# Patient Record
Sex: FEMALE | Race: WHITE | NOT HISPANIC OR LATINO | Employment: OTHER | URBAN - METROPOLITAN AREA
[De-identification: names, ages, dates, MRNs, and addresses within clinical notes are randomized per-mention and may not be internally consistent; named-entity substitution may affect disease eponyms.]

---

## 2017-01-09 ENCOUNTER — MYC MEDICAL ADVICE (OUTPATIENT)
Dept: RHEUMATOLOGY | Facility: CLINIC | Age: 77
End: 2017-01-09

## 2017-01-16 ENCOUNTER — MYC MEDICAL ADVICE (OUTPATIENT)
Dept: RHEUMATOLOGY | Facility: CLINIC | Age: 77
End: 2017-01-16

## 2017-01-18 ENCOUNTER — TRANSFERRED RECORDS (OUTPATIENT)
Dept: HEALTH INFORMATION MANAGEMENT | Facility: CLINIC | Age: 77
End: 2017-01-18

## 2017-01-18 LAB — PHQ9 SCORE: 9

## 2017-01-23 ENCOUNTER — MYC MEDICAL ADVICE (OUTPATIENT)
Dept: INTERNAL MEDICINE | Facility: CLINIC | Age: 77
End: 2017-01-23

## 2017-01-23 DIAGNOSIS — Z53.9 DIAGNOSIS NOT YET DEFINED: Primary | ICD-10-CM

## 2017-01-23 DIAGNOSIS — E78.5 HYPERLIPIDEMIA LDL GOAL <100: Primary | Chronic | ICD-10-CM

## 2017-01-23 PROCEDURE — G0179 MD RECERTIFICATION HHA PT: HCPCS | Performed by: INTERNAL MEDICINE

## 2017-01-23 NOTE — TELEPHONE ENCOUNTER
atorvastatin (LIPITOR) 40 MG tablet     Last Written Prescription Date: 1/4/2016  Last Fill Quantity: 90, # refills: 3  Last Office Visit with G, P or [facility] prescribing provider: 12/6/2016   Next 5 appointments (look out 90 days)     Jan 27, 2017  2:30 PM   Office Visit with Georgia Vázquez MD   [facility] ([facility])    [address] [place] [address]   [phone]            Feb 10, 2017 10:00 AM   Return Visit with Nico Byers MD   [facility] ([facility])    [address] [place] [address]   [phone]                   CHOL      176   10/28/2016  HDL       77   10/28/2016  LDL       78   10/28/2016  LDL       64   1/15/2013  TRIG      103   10/28/2016  CHOLHDLRATIO      2.8   1/13/2014

## 2017-01-23 NOTE — TELEPHONE ENCOUNTER
Please note that Methotrexate now requires a prior auth. Her insurance goes through [employer].    Their telephone number is [phone]. BIN# [id number], ID# [id number], NO PCN #, GR# [id number]    Please contact us when approved/denied.    Thanks,

## 2017-01-26 RX ORDER — ATORVASTATIN CALCIUM 40 MG/1
TABLET, FILM COATED ORAL
Qty: 90 TABLET | Refills: 2 | Status: SHIPPED | OUTPATIENT
Start: 2017-01-26 | End: 2017-11-14

## 2017-01-26 NOTE — TELEPHONE ENCOUNTER
Prescription approved per [facility].    Signed Prescriptions:                        Disp   Refills    atorvastatin (LIPITOR) 40 MG tablet        90 tab*2        Sig: TAKE ONE TABLET BY MOUTH EVERY DAY  Authorizing Provider: BRIONNA LOPEZ  Ordering User: MARIO GLOVER, RN, BSN

## 2017-01-27 ENCOUNTER — TELEPHONE (OUTPATIENT)
Dept: RHEUMATOLOGY | Facility: CLINIC | Age: 77
End: 2017-01-27

## 2017-01-27 ENCOUNTER — OFFICE VISIT (OUTPATIENT)
Dept: INTERNAL MEDICINE | Facility: CLINIC | Age: 77
End: 2017-01-27
Payer: COMMERCIAL

## 2017-01-27 VITALS
TEMPERATURE: 97.8 F | HEART RATE: 86 BPM | BODY MASS INDEX: 33.49 KG/M2 | DIASTOLIC BLOOD PRESSURE: 86 MMHG | SYSTOLIC BLOOD PRESSURE: 120 MMHG | OXYGEN SATURATION: 96 % | HEIGHT: 65 IN | WEIGHT: 201 LBS

## 2017-01-27 DIAGNOSIS — F33.0 MAJOR DEPRESSIVE DISORDER, RECURRENT EPISODE, MILD (H): Primary | ICD-10-CM

## 2017-01-27 DIAGNOSIS — J01.90 ACUTE SINUSITIS WITH COEXISTING CONDITION REQUIRING PROPHYLACTIC TREATMENT: ICD-10-CM

## 2017-01-27 LAB
ALBUMIN SERPL-MCNC: 3.8 G/DL (ref 3.4–5)
ALP SERPL-CCNC: 45 U/L (ref 40–150)
ALT SERPL W P-5'-P-CCNC: 39 U/L (ref 0–50)
ANION GAP SERPL CALCULATED.3IONS-SCNC: 10 MMOL/L (ref 3–14)
AST SERPL W P-5'-P-CCNC: 35 U/L (ref 0–45)
BASOPHILS # BLD AUTO: 0 10E9/L (ref 0–0.2)
BASOPHILS NFR BLD AUTO: 0.2 %
BILIRUB DIRECT SERPL-MCNC: 0.1 MG/DL (ref 0–0.2)
BILIRUB SERPL-MCNC: 0.3 MG/DL (ref 0.2–1.3)
BUN SERPL-MCNC: 10 MG/DL (ref 7–30)
CALCIUM SERPL-MCNC: 9.9 MG/DL (ref 8.5–10.1)
CHLORIDE SERPL-SCNC: 107 MMOL/L (ref 94–109)
CO2 SERPL-SCNC: 25 MMOL/L (ref 20–32)
CREAT SERPL-MCNC: 0.62 MG/DL (ref 0.52–1.04)
DIFFERENTIAL METHOD BLD: ABNORMAL
EOSINOPHIL # BLD AUTO: 0.4 10E9/L (ref 0–0.7)
EOSINOPHIL NFR BLD AUTO: 7.3 %
ERYTHROCYTE [DISTWIDTH] IN BLOOD BY AUTOMATED COUNT: 15.4 % (ref 10–15)
FOLATE SERPL-MCNC: >100 NG/ML
GFR SERPL CREATININE-BSD FRML MDRD: NORMAL ML/MIN/1.7M2
GLUCOSE SERPL-MCNC: 98 MG/DL (ref 70–99)
HCT VFR BLD AUTO: 38.5 % (ref 35–47)
HGB BLD-MCNC: 11.9 G/DL (ref 11.7–15.7)
LYMPHOCYTES # BLD AUTO: 1.6 10E9/L (ref 0.8–5.3)
LYMPHOCYTES NFR BLD AUTO: 31.6 %
MCH RBC QN AUTO: 30.6 PG (ref 26.5–33)
MCHC RBC AUTO-ENTMCNC: 30.9 G/DL (ref 31.5–36.5)
MCV RBC AUTO: 99 FL (ref 78–100)
MONOCYTES # BLD AUTO: 0.7 10E9/L (ref 0–1.3)
MONOCYTES NFR BLD AUTO: 14.1 %
NEUTROPHILS # BLD AUTO: 2.4 10E9/L (ref 1.6–8.3)
NEUTROPHILS NFR BLD AUTO: 46.8 %
PLATELET # BLD AUTO: 408 10E9/L (ref 150–450)
POTASSIUM SERPL-SCNC: 4.1 MMOL/L (ref 3.4–5.3)
PROT SERPL-MCNC: 7.6 G/DL (ref 6.8–8.8)
RBC # BLD AUTO: 3.89 10E12/L (ref 3.8–5.2)
SODIUM SERPL-SCNC: 142 MMOL/L (ref 133–144)
T4 FREE SERPL-MCNC: 0.95 NG/DL (ref 0.76–1.46)
TSH SERPL DL<=0.05 MIU/L-ACNC: 0.97 MU/L (ref 0.4–4)
VIT B12 SERPL-MCNC: 1000 PG/ML (ref 193–986)
WBC # BLD AUTO: 5.1 10E9/L (ref 4–11)

## 2017-01-27 PROCEDURE — 85025 COMPLETE CBC W/AUTO DIFF WBC: CPT | Performed by: PSYCHIATRY & NEUROLOGY

## 2017-01-27 PROCEDURE — 84443 ASSAY THYROID STIM HORMONE: CPT | Performed by: PSYCHIATRY & NEUROLOGY

## 2017-01-27 PROCEDURE — 36415 COLL VENOUS BLD VENIPUNCTURE: CPT | Performed by: PSYCHIATRY & NEUROLOGY

## 2017-01-27 PROCEDURE — 80076 HEPATIC FUNCTION PANEL: CPT | Performed by: PSYCHIATRY & NEUROLOGY

## 2017-01-27 PROCEDURE — 80048 BASIC METABOLIC PNL TOTAL CA: CPT | Performed by: PSYCHIATRY & NEUROLOGY

## 2017-01-27 PROCEDURE — 82746 ASSAY OF FOLIC ACID SERUM: CPT | Performed by: PSYCHIATRY & NEUROLOGY

## 2017-01-27 PROCEDURE — 82607 VITAMIN B-12: CPT | Performed by: PSYCHIATRY & NEUROLOGY

## 2017-01-27 PROCEDURE — 84439 ASSAY OF FREE THYROXINE: CPT | Performed by: PSYCHIATRY & NEUROLOGY

## 2017-01-27 PROCEDURE — 99214 OFFICE O/P EST MOD 30 MIN: CPT | Performed by: INTERNAL MEDICINE

## 2017-01-27 RX ORDER — DOXYCYCLINE 100 MG/1
100 CAPSULE ORAL 2 TIMES DAILY
Qty: 20 CAPSULE | Refills: 0 | Status: SHIPPED | OUTPATIENT
Start: 2017-01-27 | End: 2017-02-06

## 2017-01-27 ASSESSMENT — PAIN SCALES - GENERAL: PAINLEVEL: NO PAIN (0)

## 2017-01-27 NOTE — MR AVS SNAPSHOT
After Visit Summary   1/27/2017    Ginger Marshall    MRN: [id number]           Patient Information     Date Of Birth          1940        Visit Information        Provider Department      1/27/2017 2:30 PM Georgia Vázquez MD [facility]        Today's Diagnoses     BMI 33.0-33.9,adult    -  1     Acute sinusitis with coexisting condition requiring prophylactic treatment           Care Instructions    You need to start eating more vegetables and protein.   Eat some yogurt or probiotics while you're on the antibiotic.   Start taking Doxycycline as per prescription instructions.   Stop taking iron supplement and multivitamin while on antibiotic.   Follow up in 1 month.        [facility]    If you have any questions regarding to your visit please contact your care team:     Team Pink:   Clinic Hours Telephone Number   Internal Medicine:  Dr. Georgia Flores, NP       7am-7pm  Monday - Thursday   7am-5pm  Fridays  [phone]  (Appointment scheduling available 24/7)    Questions about your visit?  Team Line  [phone]   Urgent Care - [place] - 11am-9pm Monday-Friday Saturday-Sunday- 9am-5pm   [place] - 5pm-9pm Monday-Friday Saturday-Sunday- 9am-5pm  [phone] - [place]   [phone] - [place]       What options do I have for visits at the clinic other than the traditional office visit?  To expand how we care for you, many of our providers are utilizing electronic visits (e-visits) and telephone visits, when medically appropriate, for interactions with their patients rather than a visit in the clinic.   We also offer nurse visits for many medical concerns. Just like any other service, we will bill your insurance company for this type of visit based on time spent on the phone with your provider. Not all insurance companies cover these visits. Please check with your medical insurance if this type  of visit is covered. You will be responsible for any charges that are not paid by your insurance.      E-visits via MissingLINKhart:  generally incur a $35.00 fee.  Telephone visits:  Time spent on the phone: *charged based on time that is spent on the phone in increments of 10 minutes. Estimated cost:   5-10 mins $30.00   11-20 mins. $59.00   21-30 mins. $85.00   Use MissingLINKhart (secure email communication and access to your chart) to send your primary care provider a message or make an appointment. Ask someone on your Team how to sign up for [employer].    For a Price Quote for your services, please call our [place] Line at [phone].    As always, Thank you for trusting us with your health care needs!    Estephania Ibarra CMA          Follow-ups after your visit        Your next 10 appointments already scheduled     Feb 08, 2017  1:00 PM   LAB with  LAB   [facility] ([address] [address]   [phone]           Patient must bring picture ID.  Patient should be prepared to give a urine specimen  Please do not eat 10-12 hours before your appointment if you are coming in fasting for labs on lipids, cholesterol, or glucose (sugar).  Pregnant women should follow their Care Team instructions. Water with medications is okay. Do not drink coffee or other fluids.   If you have concerns about taking  your medications, please ask at office or if scheduling via MissingLINKhart, send a message by clicking on Secure Messaging, Message Your Care Team.            Feb 10, 2017 10:00 AM   Return Visit with Nico Byers MD   [address] [address]   [phone]            Sep 25, 2017  1:00 PM   Return Visit with Arlet Gonzalez MD   [facility] ([facility])    [address] [place] [id number] [phone]              Who to contact     If you  "have questions or need follow up information about today's clinic visit or your schedule please contact [facility] directly at [phone].  Normal or non-critical lab and imaging results will be communicated to you by [employer], letter or phone within 4 business days after the clinic has received the results. If you do not hear from us within 7 days, please contact the clinic through [employer] or phone. If you have a critical or abnormal lab result, we will notify you by phone as soon as possible.  Submit refill requests through 911 Pets or call your pharmacy and they will forward the refill request to us. Please allow 3 business days for your refill to be completed.          Additional Information About Your Visit        [employer] Information     [employer] gives you secure access to your electronic health record. If you see a primary care provider, you can also send messages to your care team and make appointments. If you have questions, please call your primary care clinic.  If you do not have a primary care provider, please call [phone] and they will assist you.        Care EveryWhere ID     This is your Care EveryWhere ID. This could be used by other organizations to access your [place] medical records  [id number]        Your Vitals Were     Pulse Temperature Height BMI (Body Mass Index) Pulse Oximetry Breastfeeding?    86 97.8  F (36.6  C) (Oral) 5' 5.25\" (1.657 m) 33.21 kg/m2 96% No       Blood Pressure from Last 3 Encounters:   01/27/17 120/86   12/30/16 116/70   12/06/16 116/68    Weight from Last 3 Encounters:   01/27/17 201 lb (91.173 kg)   12/30/16 206 lb 8 oz (93.668 kg)   12/06/16 210 lb (95.255 kg)              Today, you had the following     No orders found for display         Today's Medication Changes          These changes are accurate as of: 1/27/17  2:52 PM.  If you have any questions, ask your nurse or doctor.               Start taking these medicines.        Dose/Directions    " doxycycline Monohydrate 100 MG Caps   Used for:  Acute sinusitis with coexisting condition requiring prophylactic treatment   Started by:  Georgia Vázquez MD        Dose:  100 mg   Take 1 capsule (100 mg) by mouth 2 times daily for 10 days   Quantity:  20 capsule   Refills:  0         These medicines have changed or have updated prescriptions.        Dose/Directions    ferrous gluconate 324 (38 FE) MG tablet   Commonly known as:  FERGON   This may have changed:  when to take this   Used for:  Low iron        Dose:  324 mg   Take 1 tablet (324 mg) by mouth 3 times daily (with meals)   Quantity:  270 tablet   Refills:  1            Where to get your medicines      These medications were sent to [facility] [address] [facility]  [address] [address]     Phone:  [phone]    - doxycycline Monohydrate 100 MG Caps             Primary Care Provider Office Phone # Fax #    Georgia Vázquez [phone]       [address] [address]        Thank you!     Thank you for choosing [facility]  for your care. Our goal is always to provide you with excellent care. Hearing back from our patients is one way we can continue to improve our services. Please take a few minutes to complete the written survey that you may receive in the mail after your visit with us. Thank you!             Your Updated Medication List - Protect others around you: Learn how to safely use, store and throw away your medicines at www.disposemymeds.org.          This list is accurate as of: 1/27/17  2:52 PM.  Always use your most recent med list.                   Brand Name Dispense Instructions for use    albuterol (2.5 MG/3ML) 0.083% neb solution     180 mL    Take 1 vial (2.5 mg) by nebulization every 6 hours as needed for shortness of breath / dyspnea or wheezing       ascorbic acid 1000 MG Tabs tablet      Take 1 tablet  by mouth 3 times daily       aspirin 81 MG tablet      Take 1 tablet by mouth daily.       atorvastatin 40 MG tablet    LIPITOR    90 tablet    TAKE ONE TABLET BY MOUTH EVERY DAY       CALCIUM CITRATE + PO      Take 2 tablets by mouth daily       cetirizine 10 MG tablet    zyrTEC    90 tablet    Take 1 tablet by mouth daily.       cyanocobalamin 500 MCG Tabs      Take 1 tablet by mouth 2 times daily       doxycycline Monohydrate 100 MG Caps     20 capsule    Take 1 capsule (100 mg) by mouth 2 times daily for 10 days       DULoxetine 60 MG EC capsule    CYMBALTA    180 capsule    Take 1 capsule (60 mg) by mouth 2 times daily       ferrous gluconate 324 (38 FE) MG tablet    FERGON    270 tablet    Take 1 tablet (324 mg) by mouth 3 times daily (with meals)       fluticasone 50 MCG/ACT spray    FLONASE    16 g    Spray 2 sprays into both nostrils daily       folic acid 1 MG tablet    FOLVITE    100 tablet    Take 1 tablet (1 mg) by mouth daily       magnesium oxide 400 (241.3 MG) MG tablet    MAG-OX     Take 1 tablet (400 mg) by mouth daily       methotrexate 2.5 MG tablet CHEMO     120 tablet    Take 10 tablets (25 mg) by mouth once a week .  Split dose between AM and PM (5 tabs in the morning, and 5 tabs in the evening; all taken within the same day each week)       Multi-vitamin Tabs tablet      Take 1 tablet by mouth daily       NEW MED      Hypo-chlor spray soln. Use BID on eyelids       OMEGA-3 FISH OIL PO      Take 2,000 mg by mouth daily       order for [facility]      Respironics REMSTAR 60 Series Auto CPAP 12-15 cm H2O, Wisp nasal mask w/a s/m cushion       pantoprazole 40 MG EC tablet    PROTONIX    90 tablet    TAKE ONE TABLET BY MOUTH ONCE DAILY 30-60 MINUTES BEFORE A MEAL       propylene glycol 0.6 % Soln ophthalmic solution    SYSTANE BALANCE     Place 1 drop into both eyes 4 times daily       sodium chloride 5 % ophthalmic ointment    BIRDIE 128     Apply small amount to both eyelids at bedtime       tofacitinib  11 MG 24 hr tablet    XELJANZ XR    90 tablet    Take 1 tablet (11 mg) by mouth daily       topiramate 25 MG tablet    TOPAMAX    270 tablet    Take 1 in the morning and 2 at bedtime.       TUMS PO      Take  by mouth as needed.       TYLENOL 500 MG tablet   Generic drug:  acetaminophen     100 tablet    Take 2 tablets (1,000 mg) by mouth every 8 hours as needed for pain       vitamin B complex with vitamin C Tabs tablet      Take 1 tablet by mouth daily.       VITAMIN D-3 PO      Take 1,000 Units by mouth 2 times daily       VITAMIN E PO      Take 2,000 Units by mouth

## 2017-01-27 NOTE — PROGRESS NOTES
Medical Weight Loss - Return Visit      CHIEF COMPLAINT:     Chief Complaint   Patient presents with     Weight Problem     follow-up       HISTORY OF PRESENT ILLNESS (HPI):    Patient  returns today for medical weight loss follow-up visit. Patient was last seen by me on 12/30/2016 and has lost 5 pounds. Fat percentage is down 2%.     She continues on Topiramate.     Patient is seeing a psychologist    Sinus Symptoms: She started having a cold on Saturday. She thinks that she is getting a sinus infection. She feels like she has been about the same since it started. She continues to have a lot of pressure in her sinuses. She confirms that she has an occasional cough, and is able to relieve some congestion when she uses a nasal rinse.     Weight Check: She explains that at home she was able to get under 200 pounds. She is able to wear the clothes she had set aside for herself when she got thinner. She notes that this morning she took her old clothes to the [employer] store.     Diet: She is not doing too much protein powder as much anymore. She states that this is because she doesn't have an appetite anymore. She feels that she has a tendency to forget to record all of her food into her food diary.     10 point ROS of systems including Constitutional, Eyes, Respiratory, Cardiovascular, Gastroenterology, Genitourinary, Integumentary, Muscularskeletal, Psychiatric were all negative except for pertinent positives noted in my HPI.     This document serves as a record of the services and decisions personally performed and made by Georgia Vázquez MD. It was created on his/her behalf by Carmen Miller, a trained medical scribe. The creation of this document is based the provider's statements to the medical scribe.    Margarita Miller 2:37 PM, January 27, 2017    MEDICATIONS:   Current Outpatient Prescriptions   Medication Sig Dispense Refill     atorvastatin (LIPITOR) 40 MG tablet TAKE ONE TABLET BY MOUTH EVERY DAY 90  tablet 2     DULoxetine (CYMBALTA) 60 MG EC capsule Take 1 capsule (60 mg) by mouth 2 times daily 180 capsule 1     pantoprazole (PROTONIX) 40 MG enteric coated tablet TAKE ONE TABLET BY MOUTH ONCE DAILY 30-60 MINUTES BEFORE A MEAL 90 tablet 1     order for [facility] Respironics REMSTAR 60 Series Auto CPAP 12-15 cm H2O, Wisp nasal mask w/a s/m cushion       VITAMIN E PO Take 2,000 Units by mouth       fluticasone (FLONASE) 50 MCG/ACT nasal spray Spray 2 sprays into both nostrils daily 16 g 3     Omega-3 Fatty Acids (OMEGA-3 FISH OIL PO) Take 2,000 mg by mouth daily        methotrexate 2.5 MG tablet Take 10 tablets (25 mg) by mouth once a week .  Split dose between AM and PM (5 tabs in the morning, and 5 tabs in the evening; all taken within the same day each week) 120 tablet 1     folic acid (FOLVITE) 1 MG tablet Take 1 tablet (1 mg) by mouth daily 100 tablet 3     multivitamin, therapeutic with minerals (MULTI-VITAMIN) TABS Take 1 tablet by mouth daily       magnesium oxide (MAG-OX) 400 (241.3 MG) MG tablet Take 1 tablet (400 mg) by mouth daily       propylene glycol (SYSTANE BALANCE) 0.6 % SOLN ophthalmic solution Place 1 drop into both eyes 4 times daily       sodium chloride (BIRDIE 128) 5 % ophthalmic ointment Apply small amount to both eyelids at bedtime  0     NEW MED Hypo-chlor spray soln. Use BID on eyelids       topiramate (TOPAMAX) 25 MG tablet Take 1 in the morning and 2 at bedtime. 270 tablet 4     tofacitinib (XELJANZ XR) 11 MG XR tablet Take 1 tablet (11 mg) by mouth daily 90 tablet 3     albuterol (2.5 MG/3ML) 0.083% nebulizer solution Take 1 vial (2.5 mg) by nebulization every 6 hours as needed for shortness of breath / dyspnea or wheezing 180 mL 2     ferrous gluconate (FERGON) 324 (38 FE) MG tablet Take 1 tablet (324 mg) by mouth 3 times daily (with meals) (Patient taking differently: Take 324 mg by mouth 2 times daily ) 270 tablet 1     cyanocobalamin 500 MCG TABS Take 1 tablet by mouth 2 times daily    "    Calcium Citrate-Vitamin D (CALCIUM CITRATE + PO) Take 2 tablets by mouth daily       Cholecalciferol (VITAMIN D-3 PO) Take 1,000 Units by mouth 2 times daily       Ascorbic Acid 1000 MG TABS Take 1 tablet by mouth 3 times daily       acetaminophen (TYLENOL) 500 MG tablet Take 2 tablets (1,000 mg) by mouth every 8 hours as needed for pain 100 tablet 0     vitamin  B complex with vitamin C (VITAMIN  B COMPLEX) TABS Take 1 tablet by mouth daily.  0     Calcium Carbonate Antacid (TUMS PO) Take  by mouth as needed.       cetirizine (ZYRTEC) 10 MG tablet Take 1 tablet by mouth daily. 90 tablet 3     aspirin 81 MG tablet Take 1 tablet by mouth daily.         ALLERGIES:   Allergies   Allergen Reactions     Abatacept      Adhesive Tape      Plastic tape     Celebrex [Celecoxib]      Ineffective     Ethanol      Antihistamines     Orencia [Abatacept]      Headache     Septra [Bactrim]      Sulfa Drugs      Tramadol      Headache     Vicodin [Hydrocodone-Acetaminophen]        PHYSICAL EXAMINATION:    VITALS: /86 mmHg  Pulse 86  Temp(Src) 97.8  F (36.6  C) (Oral)  Ht 1.657 m (5' 5.25\")  Wt 91.173 kg (201 lb)  BMI 33.21 kg/m2  SpO2 96%  Breastfeeding? No  GENERAL: Patient is a 76 year old year old female  in no acute distress.  Patient is alert and orientated x 4, pleasant and cooperative with exam.     CARDIOVASCULAR:  Regular rate and rhythm without murmurs, rubs, or gallops.  RESPIRATORY:  Lungs are clear to auscultation bilaterally, respiratory effort is normal.   HENT: Ears normal. Throat and pharynx normal. Neck supple. No adenopathy or masses in the neck or supraclavicular regions. Sinuses non tender.  No edema    LAB RESULTS:   Office Visit on 10/28/2016   Component Date Value Ref Range Status     Cholesterol 10/28/2016 176  <200 mg/dL Final     Triglycerides 10/28/2016 103  <150 mg/dL Final     HDL Cholesterol 10/28/2016 77  >49 mg/dL Final     LDL Cholesterol Calculated 10/28/2016 78  <100 mg/dL Final "    Desirable:       <100 mg/dl     Non HDL Cholesterol 10/28/2016 99  <130 mg/dL Final     Hemoglobin A1C 10/28/2016 5.5  4.3 - 6.0 % Final       ASSESSMENT/PLAN:    I spent 25 minutes of time with the patient and >50% of it was in education and counseling regarding weight loss management.     1. BMI 33.0-33.9,adult  The current medical regimen is effective;  continue present plan and medications.     2. Acute sinusitis with coexisting condition requiring prophylactic treatment   Patient to start antibiotic regimen  - doxycycline Monohydrate 100 MG CAPS; Take 1 capsule (100 mg) by mouth 2 times daily for 10 days  Dispense: 20 capsule; Refill: 0     Patient is to call the clinic if she experiences any adverse reactions.    Patient Instructions   You need to start eating more vegetables and protein.   Eat some yogurt or probiotics while you're on the antibiotic.   Start taking Doxycycline as per prescription instructions. Make sure you take it with food.   Stop taking iron supplement and multivitamin while on antibiotic.       The information in this document, created by the medical scribe for me, accurately reflects the services I personally performed and the decisions made by me. I have reviewed and approved this document for accuracy prior to leaving the patient care area.  Georgia Vázquez MD  2:39 PM, 01/27/2017    Georgia Vázquez MD  Internal Medicine    American Board of Obesity Medicine Diplomate     Start: 2:35 PM  End: 2:50 PM

## 2017-01-27 NOTE — PATIENT INSTRUCTIONS
You need to start eating more vegetables and protein.   Eat some yogurt or probiotics while you're on the antibiotic.   Start taking Doxycycline as per prescription instructions.   Stop taking iron supplement and multivitamin while on antibiotic.   Follow up in 1 month.        [facility]    If you have any questions regarding to your visit please contact your care team:     Team Pink:   Clinic Hours Telephone Number   Internal Medicine:  Dr. Goergia Flores, NP       7am-7pm  Monday - Thursday   7am-5pm  Fridays  [phone]  (Appointment scheduling available 24/7)    Questions about your visit?  Team Line  [phone]   Urgent Care - [place] - 11am-9pm Monday-Friday Saturday-Sunday- 9am-5pm   [place] - 5pm-9pm Monday-Friday Saturday-Sunday- 9am-5pm  [phone] - [place]   [phone] - [place]       What options do I have for visits at the clinic other than the traditional office visit?  To expand how we care for you, many of our providers are utilizing electronic visits (e-visits) and telephone visits, when medically appropriate, for interactions with their patients rather than a visit in the clinic.   We also offer nurse visits for many medical concerns. Just like any other service, we will bill your insurance company for this type of visit based on time spent on the phone with your provider. Not all insurance companies cover these visits. Please check with your medical insurance if this type of visit is covered. You will be responsible for any charges that are not paid by your insurance.      E-visits via MoneyMan:  generally incur a $35.00 fee.  Telephone visits:  Time spent on the phone: *charged based on time that is spent on the phone in increments of 10 minutes. Estimated cost:   5-10 mins $30.00   11-20 mins. $59.00   21-30 mins. $85.00   Use MoneyMan (secure email communication and access to your chart) to send your  primary care provider a message or make an appointment. Ask someone on your Team how to sign up for [employer].    For a Price Quote for your services, please call our Consumer Price Line at [phone].    As always, Thank you for trusting us with your health care needs!    Estephania Ibarra CMA

## 2017-02-02 ENCOUNTER — MYC MEDICAL ADVICE (OUTPATIENT)
Dept: INTERNAL MEDICINE | Facility: CLINIC | Age: 77
End: 2017-02-02

## 2017-02-08 DIAGNOSIS — M05.79 RHEUMATOID ARTHRITIS INVOLVING MULTIPLE SITES WITH POSITIVE RHEUMATOID FACTOR (H): ICD-10-CM

## 2017-02-08 DIAGNOSIS — Z79.899 HIGH RISK MEDICATIONS (NOT ANTICOAGULANTS) LONG-TERM USE: ICD-10-CM

## 2017-02-08 DIAGNOSIS — F33.0 MAJOR DEPRESSIVE DISORDER, RECURRENT EPISODE, MILD (H): ICD-10-CM

## 2017-02-08 LAB
ALBUMIN SERPL-MCNC: 3.6 G/DL (ref 3.4–5)
ALP SERPL-CCNC: 41 U/L (ref 40–150)
ALT SERPL W P-5'-P-CCNC: 37 U/L (ref 0–50)
AMPHETAMINES UR QL SCN: NORMAL
AST SERPL W P-5'-P-CCNC: 21 U/L (ref 0–45)
BARBITURATES UR QL: NORMAL
BASOPHILS # BLD AUTO: 0 10E9/L (ref 0–0.2)
BASOPHILS NFR BLD AUTO: 0.2 %
BENZODIAZ UR QL: NORMAL
BILIRUB DIRECT SERPL-MCNC: <0.1 MG/DL (ref 0–0.2)
BILIRUB SERPL-MCNC: 0.2 MG/DL (ref 0.2–1.3)
CANNABINOIDS UR QL SCN: NORMAL
COCAINE UR QL: NORMAL
CREAT SERPL-MCNC: 0.8 MG/DL (ref 0.52–1.04)
CRP SERPL-MCNC: 7 MG/L (ref 0–8)
DIFFERENTIAL METHOD BLD: ABNORMAL
EOSINOPHIL # BLD AUTO: 0.3 10E9/L (ref 0–0.7)
EOSINOPHIL NFR BLD AUTO: 6.1 %
ERYTHROCYTE [DISTWIDTH] IN BLOOD BY AUTOMATED COUNT: 15.9 % (ref 10–15)
ERYTHROCYTE [SEDIMENTATION RATE] IN BLOOD BY WESTERGREN METHOD: 34 MM/H (ref 0–30)
GFR SERPL CREATININE-BSD FRML MDRD: 70 ML/MIN/1.7M2
HCT VFR BLD AUTO: 36.4 % (ref 35–47)
HGB BLD-MCNC: 11.5 G/DL (ref 11.7–15.7)
LYMPHOCYTES # BLD AUTO: 1.2 10E9/L (ref 0.8–5.3)
LYMPHOCYTES NFR BLD AUTO: 25.2 %
MCH RBC QN AUTO: 31.5 PG (ref 26.5–33)
MCHC RBC AUTO-ENTMCNC: 31.6 G/DL (ref 31.5–36.5)
MCV RBC AUTO: 100 FL (ref 78–100)
MONOCYTES # BLD AUTO: 0.6 10E9/L (ref 0–1.3)
MONOCYTES NFR BLD AUTO: 13.7 %
NEUTROPHILS # BLD AUTO: 2.5 10E9/L (ref 1.6–8.3)
NEUTROPHILS NFR BLD AUTO: 54.8 %
OPIATES UR QL SCN: NORMAL
PCP UR QL SCN: NORMAL
PLATELET # BLD AUTO: 364 10E9/L (ref 150–450)
PROT SERPL-MCNC: 7.4 G/DL (ref 6.8–8.8)
RBC # BLD AUTO: 3.65 10E12/L (ref 3.8–5.2)
WBC # BLD AUTO: 4.6 10E9/L (ref 4–11)

## 2017-02-08 PROCEDURE — 86140 C-REACTIVE PROTEIN: CPT | Performed by: INTERNAL MEDICINE

## 2017-02-08 PROCEDURE — 80307 DRUG TEST PRSMV CHEM ANLYZR: CPT | Performed by: PSYCHIATRY & NEUROLOGY

## 2017-02-08 PROCEDURE — 85652 RBC SED RATE AUTOMATED: CPT | Performed by: INTERNAL MEDICINE

## 2017-02-08 PROCEDURE — 85025 COMPLETE CBC W/AUTO DIFF WBC: CPT | Performed by: INTERNAL MEDICINE

## 2017-02-08 PROCEDURE — 82565 ASSAY OF CREATININE: CPT | Performed by: INTERNAL MEDICINE

## 2017-02-08 PROCEDURE — 36415 COLL VENOUS BLD VENIPUNCTURE: CPT | Performed by: INTERNAL MEDICINE

## 2017-02-08 PROCEDURE — 80076 HEPATIC FUNCTION PANEL: CPT | Performed by: INTERNAL MEDICINE

## 2017-02-10 ENCOUNTER — OFFICE VISIT (OUTPATIENT)
Dept: RHEUMATOLOGY | Facility: CLINIC | Age: 77
End: 2017-02-10
Payer: COMMERCIAL

## 2017-02-10 ENCOUNTER — OFFICE VISIT (OUTPATIENT)
Dept: FAMILY MEDICINE | Facility: CLINIC | Age: 77
End: 2017-02-10
Payer: COMMERCIAL

## 2017-02-10 VITALS
OXYGEN SATURATION: 94 % | HEART RATE: 95 BPM | WEIGHT: 202.6 LBS | SYSTOLIC BLOOD PRESSURE: 112 MMHG | TEMPERATURE: 97 F | DIASTOLIC BLOOD PRESSURE: 68 MMHG | BODY MASS INDEX: 33.47 KG/M2

## 2017-02-10 VITALS
DIASTOLIC BLOOD PRESSURE: 60 MMHG | TEMPERATURE: 96.7 F | BODY MASS INDEX: 33.34 KG/M2 | OXYGEN SATURATION: 98 % | WEIGHT: 201.8 LBS | HEART RATE: 98 BPM | SYSTOLIC BLOOD PRESSURE: 120 MMHG

## 2017-02-10 DIAGNOSIS — M54.5 CHRONIC MIDLINE LOW BACK PAIN, WITH SCIATICA PRESENCE UNSPECIFIED: ICD-10-CM

## 2017-02-10 DIAGNOSIS — G89.29 CHRONIC MIDLINE LOW BACK PAIN, WITH SCIATICA PRESENCE UNSPECIFIED: ICD-10-CM

## 2017-02-10 DIAGNOSIS — J01.90 ACUTE SINUSITIS WITH SYMPTOMS > 10 DAYS: Primary | ICD-10-CM

## 2017-02-10 DIAGNOSIS — M05.79 RHEUMATOID ARTHRITIS INVOLVING MULTIPLE SITES WITH POSITIVE RHEUMATOID FACTOR (H): Primary | ICD-10-CM

## 2017-02-10 DIAGNOSIS — Z79.899 HIGH RISK MEDICATIONS (NOT ANTICOAGULANTS) LONG-TERM USE: ICD-10-CM

## 2017-02-10 PROCEDURE — 99213 OFFICE O/P EST LOW 20 MIN: CPT | Performed by: NURSE PRACTITIONER

## 2017-02-10 PROCEDURE — 99214 OFFICE O/P EST MOD 30 MIN: CPT | Performed by: INTERNAL MEDICINE

## 2017-02-10 RX ORDER — METHOTREXATE SODIUM 2.5 MG/1
25 TABLET ORAL WEEKLY
Qty: 120 TABLET | Refills: 1 | Status: SHIPPED | OUTPATIENT
Start: 2017-02-10 | End: 2017-05-01

## 2017-02-10 RX ORDER — SERTRALINE HYDROCHLORIDE 25 MG/1
25 TABLET, FILM COATED ORAL DAILY
COMMUNITY
Start: 2017-02-07 | End: 2018-06-22

## 2017-02-10 RX ORDER — CEFDINIR 300 MG/1
300 CAPSULE ORAL 2 TIMES DAILY
Qty: 20 CAPSULE | Refills: 0 | Status: SHIPPED | OUTPATIENT
Start: 2017-02-10 | End: 2017-02-23

## 2017-02-10 ASSESSMENT — PAIN SCALES - GENERAL: PAINLEVEL: EXTREME PAIN (8)

## 2017-02-10 ASSESSMENT — ANXIETY QUESTIONNAIRES
6. BECOMING EASILY ANNOYED OR IRRITABLE: SEVERAL DAYS
IF YOU CHECKED OFF ANY PROBLEMS ON THIS QUESTIONNAIRE, HOW DIFFICULT HAVE THESE PROBLEMS MADE IT FOR YOU TO DO YOUR WORK, TAKE CARE OF THINGS AT HOME, OR GET ALONG WITH OTHER PEOPLE: NOT DIFFICULT AT ALL
1. FEELING NERVOUS, ANXIOUS, OR ON EDGE: NOT AT ALL
2. NOT BEING ABLE TO STOP OR CONTROL WORRYING: NOT AT ALL
5. BEING SO RESTLESS THAT IT IS HARD TO SIT STILL: NOT AT ALL
GAD7 TOTAL SCORE: 1
3. WORRYING TOO MUCH ABOUT DIFFERENT THINGS: NOT AT ALL
7. FEELING AFRAID AS IF SOMETHING AWFUL MIGHT HAPPEN: NOT AT ALL

## 2017-02-10 ASSESSMENT — PATIENT HEALTH QUESTIONNAIRE - PHQ9: 5. POOR APPETITE OR OVEREATING: NOT AT ALL

## 2017-02-10 NOTE — MR AVS SNAPSHOT
After Visit Summary   2/10/2017    Ginger Marshall    MRN: [id number]           Patient Information     Date Of Birth          1940        Visit Information        Provider Department      2/10/2017 12:20 PM Dahiana Flores APRN [facility]        Today's Diagnoses     Acute sinusitis with symptoms > 10 days    -  1       Care Instructions    [facility]    If you have any questions regarding to your visit please contact your care team:     Team Pink:   Clinic Hours Telephone Number   Internal Medicine:  Dr. Georgia Flores, NP       7am-7pm  Monday - Thursday   7am-5pm  Fridays  [phone]  (Appointment scheduling available 24/7)    Questions about your visit?  Team Line  [phone]   Urgent Care - [place] and [place] - 11am-9pm Monday-Friday Saturday-Sunday- 9am-5pm   [place] - 5pm-9pm Monday-Friday Saturday-Sunday- 9am-5pm  [phone] - [place]   [phone] - [place]       What options do I have for visits at the clinic other than the traditional office visit?  To expand how we care for you, many of our providers are utilizing electronic visits (e-visits) and telephone visits, when medically appropriate, for interactions with their patients rather than a visit in the clinic.   We also offer nurse visits for many medical concerns. Just like any other service, we will bill your insurance company for this type of visit based on time spent on the phone with your provider. Not all insurance companies cover these visits. Please check with your medical insurance if this type of visit is covered. You will be responsible for any charges that are not paid by your insurance.      E-visits via Roommate[employer]:  generally incur a $35.00 fee.  Telephone visits:  Time spent on the phone: *charged based on time that is spent on the phone in increments of 10 minutes. Estimated cost:   5-10 mins $30.00   11-20  mins. $59.00   21-30 mins. $85.00   Use [employer] (secure email communication and access to your chart) to send your primary care provider a message or make an appointment. Ask someone on your Team how to sign up for [employer].    For a Price Quote for your services, please call our Consumer Price Line at [phone].    As always, Thank you for trusting us with your health care needs!    Melinda SHULTZ CMA ([facility])          Follow-ups after your visit        Your next 10 appointments already scheduled     Mar 01, 2017 11:30 AM   Office Visit with Georgia Vázquez MD   [facility]    [address] [place] [id number] [phone]           Bring a current list of meds and any records pertaining to this visit.  For Physicals, please bring immunization records and any forms needing to be filled out.  Please arrive 10 minutes early to complete paperwork.            May 05, 2017 11:00 AM   LAB with [facility]   [facility]    [address] [place] [id number] [phone]           Patient must bring picture ID.  Patient should be prepared to give a urine specimen  Please do not eat 10-12 hours before your appointment if you are coming in fasting for labs on lipids, cholesterol, or glucose (sugar).  Pregnant women should follow their Care Team instructions. Water with medications is okay. Do not drink coffee or other fluids.   If you have concerns about taking  your medications, please ask at office or if scheduling via [employer], send a message by clicking on Secure Messaging, Message Your Care Team.            May 10, 2017 10:40 AM   Return Visit with Nico Byers MD   [facility]    [address] [place] [id number] [phone]            Sep 25, 2017  1:00 PM   [facility]     [address] [address] [place] [id number] [phone]              Future tests that were ordered for you today     Open Future Orders        Priority Expected Expires Ordered    CBC with platelets differential Routine 5/7/2017 5/26/2017 2/10/2017    Creatinine Routine 5/7/2017 5/26/2017 2/10/2017    CRP inflammation Routine 5/7/2017 5/26/2017 2/10/2017    Erythrocyte sedimentation rate auto Routine 5/7/2017 5/26/2017 2/10/2017    Hepatic panel Routine 5/7/2017 5/26/2017 2/10/2017            Who to contact     If you have questions or need follow up information about today's clinic visit or your schedule please contact [facility] directly at [phone].  Normal or non-critical lab and imaging results will be communicated to you by [employer], letter or phone within 4 business days after the clinic has received the results. If you do not hear from us within 7 days, please contact the clinic through [employer] or phone. If you have a critical or abnormal lab result, we will notify you by phone as soon as possible.  Submit refill requests through Cardio control or call your pharmacy and they will forward the refill request to us. Please allow 3 business days for your refill to be completed.          Additional Information About Your Visit        [employer] Information     Card[employer] control gives you secure access to your electronic health record. If you see a primary care provider, you can also send messages to your care team and make appointments. If you have questions, please call your primary care clinic.  If you do not have a primary care provider, please call [phone] and they will assist you.        Care EveryWhere ID     This is your Care EveryWhere ID. This could be used by other organizations to access your [place] medical records  [id number]        Your Vitals Were     Pulse Temperature Pulse Oximetry Breastfeeding?          95 97  F (36.1  C) (Oral) 94% No         Blood Pressure from Last 3 Encounters:    02/10/17 112/68   02/10/17 120/60   01/27/17 120/86    Weight from Last 3 Encounters:   02/10/17 202 lb 9.6 oz (91.899 kg)   02/10/17 201 lb 12.8 oz (91.536 kg)   01/27/17 201 lb (91.173 kg)              Today, you had the following     No orders found for display         Today's Medication Changes          These changes are accurate as of: 2/10/17 12:41 PM.  If you have any questions, ask your nurse or doctor.               Start taking these medicines.        Dose/Directions    cefdinir 300 MG capsule   Commonly known as:  OMNICEF   Used for:  Acute sinusitis with symptoms > 10 days   Started by:  Dahiana Flores APRN CNP        Dose:  300 mg   Take 1 capsule (300 mg) by mouth 2 times daily   Quantity:  20 capsule   Refills:  0         These medicines have changed or have updated prescriptions.        Dose/Directions    ferrous gluconate 324 (38 FE) MG tablet   Commonly known as:  FERGON   This may have changed:  when to take this   Used for:  Low iron        Dose:  324 mg   Take 1 tablet (324 mg) by mouth 3 times daily (with meals)   Quantity:  270 tablet   Refills:  1       methotrexate 2.5 MG tablet   This may have changed:  additional instructions   Used for:  Rheumatoid arthritis involving multiple sites with positive rheumatoid factor (H), High risk medications (not anticoagulants) long-term use   Changed by:  Nico Byers MD        Dose:  25 mg   Take 10 tablets (25 mg) by mouth once a week   Quantity:  120 tablet   Refills:  1            Where to get your medicines      These medications were sent to [facility] - [place] [address], [place] [address]     Phone:  [phone]    - cefdinir 300 MG capsule  - methotrexate 2.5 MG tablet             Primary Care Provider Office Phone # Fax #    Georgia Vázquez [phone]       [address] [address]        Thank you!      [facility] you [facility]  [facility]. Our goal is always to provide you with excellent care. Hearing back from our patients is one way we can continue to improve our services. Please take a few minutes to complete the written survey that you may receive in the mail after your visit with us. Thank you!             Your Updated Medication List - Protect others around you: Learn how to safely use, store and throw away your medicines at www.disposemymeds.org.          This list is accurate as of: 2/10/17 12:41 PM.  Always use your most recent med list.                   Brand Name Dispense Instructions for use    albuterol (2.5 MG/3ML) 0.083% neb solution     180 mL    Take 1 vial (2.5 mg) by nebulization every 6 hours as needed for shortness of breath / dyspnea or wheezing       ascorbic acid 1000 MG Tabs tablet      Take 1 tablet by mouth 3 times daily       aspirin 81 MG tablet      Take 1 tablet by mouth daily.       atorvastatin 40 MG tablet    LIPITOR    90 tablet    TAKE ONE TABLET BY MOUTH EVERY DAY       CALCIUM CITRATE + PO      Take 2 tablets by mouth daily       cefdinir 300 MG capsule    OMNICEF    20 capsule    Take 1 capsule (300 mg) by mouth 2 times daily       cetirizine 10 MG tablet    zyrTEC    90 tablet    Take 1 tablet by mouth daily.       cyanocobalamin 500 MCG Tabs      Take 1 tablet by mouth 2 times daily       ferrous gluconate 324 (38 FE) MG tablet    FERGON    270 tablet    Take 1 tablet (324 mg) by mouth 3 times daily (with meals)       fluticasone 50 MCG/ACT spray    FLONASE    16 g    Spray 2 sprays into both nostrils daily       folic acid 1 MG tablet    FOLVITE    100 tablet    Take 1 tablet (1 mg) by mouth daily       magnesium oxide 400 (241.3 MG) MG tablet    MAG-OX     Take 1 tablet (400 mg) by mouth daily       methotrexate 2.5 MG tablet     120 tablet    Take 10 tablets (25 mg) by mouth once a week       Multi-vitamin Tabs tablet      Take 1 tablet by  mouth daily       NEW MED      Hypo-chlor spray soln. Use BID on eyelids       OMEGA-3 FISH OIL PO      Take 2,000 mg by mouth daily       order for DME      Respironics REMSTAR 60 Series Auto CPAP 12-15 cm H2O, Wisp nasal mask w/a s/m cushion       pantoprazole 40 MG EC tablet    PROTONIX    90 tablet    TAKE ONE TABLET BY MOUTH ONCE DAILY 30-60 MINUTES BEFORE A MEAL       propylene glycol 0.6 % Soln ophthalmic solution    SYSTANE BALANCE     Place 1 drop into both eyes 4 times daily       sodium chloride 5 % ophthalmic ointment    BIRDIE 128     Apply small amount to both eyelids at bedtime       tofacitinib 11 MG 24 hr tablet    XELJANZ XR    90 tablet    Take 1 tablet (11 mg) by mouth daily       topiramate 25 MG tablet    TOPAMAX    270 tablet    Take 1 in the morning and 2 at bedtime.       TUMS PO      Take  by mouth as needed.       TYLENOL 500 MG tablet   Generic drug:  acetaminophen     100 tablet    Take 2 tablets (1,000 mg) by mouth every 8 hours as needed for pain       vitamin B complex with vitamin C Tabs tablet      Take 1 tablet by mouth daily.       VITAMIN D-3 PO      Take 1,000 Units by mouth 2 times daily       VITAMIN E PO      Take 2,000 Units by mouth       ZOLOFT 25 MG tablet   Generic drug:  sertraline      Take 25 mg by mouth

## 2017-02-10 NOTE — NURSING NOTE
"Chief Complaint   Patient presents with     RECHECK     URI symptoms       Initial /68 mmHg  Pulse 95  Temp(Src) 97  F (36.1  C) (Oral)  Wt 202 lb 9.6 oz (91.899 kg)  SpO2 94%  Breastfeeding? No Estimated body mass index is 33.47 kg/(m^2) as calculated from the following:    Height as of 1/27/17: 5' 5.25\" (1.657 m).    Weight as of this encounter: 202 lb 9.6 oz (91.899 kg).  Medication Reconciliation: complete       Estephania Ibarra, TOSHA      "

## 2017-02-10 NOTE — PROGRESS NOTES
Rheumatology Clinic Visit      Ginger Marshall MRN# [id number]   YOB: 1940 Age: 76 year old      Date of visit: 2/10/17   PCP: Georgia Vázquez MD     Chief Complaint   Patient presents with:  RECHECK: 3mo follow up on RA, PT states just her back has been bothering her, just minor aches and pains     Assessment and Plan   1. Seropositive (, CCP >100; hx of rheumatoid nodule by 6/14/2011 pathology) Erosive Rheumatoid Arthritis: Previously failed remicade (staph infection during therapy, but was immediately after a joint injection) and orencia (migraines). Currently on methotrexate 25 mg once weekly, folic acid 1 mg daily, Xeljanz XR 11mg daily. She also has prednisone 20 mg daily ×5 days to use as needed for flare; she has not required this since her previous clinic visit.  Doing well with no synovitis on exam today.   - Continue methotrexate to 25 mg once weekly; split dose between AM and PM of the same day  - Continue folic acid 1 mg daily; she has history of oral sores and may increase folic acid to 2 mg daily if needed  - Continue Xeljanz XR 11mg daily  - PRN Flare: prednisone 20mg daily x5days  - Labs 2-3 days prior to the next rheumatology clinic visit: CBC, Creatinine, Hepatic Panel, ESR, CRP    2. Right hip pain: Status post WALTER twice in the past. Followed by [facility] orthopedics.  Note that she does have spinal stenosis that may be contributing and she has had a steroid injection of her back with reduction of her back pain only.  This is not an issue today.    3. Chronic back pain: She has been to an orthopedic surgeon told her that she needed an operation but then when she went to see her orthopedic surgeon that did the hip replacement he told her not to have the surgery. She continues to have back pain and she says that it is significantly affecting her daily life. She is planning to go to physical therapy for same. Recommended that she see a neurosurgeon given spinal stenosis, for  another opinion.  - Neurosurgery referral    4. Bone Health: Managed by Endocrinology.     5. Iron Deficiency Anemia: Managed by PCP.     6. Vaccinations:   - Influenza: up to date  - [id number]: up to date  - [id number]: up to date  - Zostavax: contraindicated at this time because of xeljanz    Ms. Marshall verbalized agreement with and understanding of the rational for the diagnosis and treatment plan.  All questions were answered to best of my ability and the patient's satisfaction. Ms. Marshall was advised to contact the clinic with any questions that may arise after the clinic visit.      Thank you for involving me in the care of the patient    Return to clinic: 3 months    HPI   Ginger Marshall is a 76 year old female with a history of multiple foot surgeries, hand tendon transfers, MCP replacements (most recent being in August 2015), bilateral TKA, right WALTER, left distal radius fracture history, osteoporosis and seropositive (RF+, CCP+) erosive rheumatoid arthritis who presents for follow-up of rheumatoid arthritis.      Today, Ms. Marshall reports that her rheumatoid arthritis is doing great. She is tolerating medications well. She occasionally has an oral sore and will increase folic acid to 2 mg daily. She believes that she does have sensitive gums. No morning stiffness. Occasional gelling phenomenon that resolves quickly. Her main issue is persistent back pain. She had spine surgery in the past, she thinks it was around 2013, steroid injections, and physical therapy. She was given physical therapy again. Back pain is significantly affecting her daily activities. She tells me that it does not seem right but her daughter has to come help her put her groceries away because her back pain is preventing her from doing it.     She continues to follow with endocrinology for osteoporosis management.    Denies fevers, chills, nausea, vomiting, constipation, diarrhea. No abdominal pain. No chest pain/pressure, palpitations,  or shortness of breath. No nasal or oral sores.   No neck pain. No rash. No LE swelling.     Tobacco: quit in 1999  EtOH: 1 glass of wine every month at most  Drugs: None  Occupation: , retired     ROS   GEN: No fevers, chills  SKIN: No itching, rashes, sores  HEENT:  No epistaxis. No oral or nasal ulcers.  CV: No chest pain, pressure, palpitations, or dyspnea on exertion.  PULM: No SOB, wheeze, cough.  GI:  No nausea, vomiting, constipation, diarrhea. No blood in stool. No abdominal pain.  : No blood in urine.  MSK: See HPI.  NEURO: No numbness or tingling  EXT: No LE swelling    Active Problem List     Patient Active Problem List   Diagnosis     RA (Rheumatoid Arthritis) off Plaquenil     Menopause     History of colonic polyps     Mitral Regurgitation     Multinodular goiter     CARDIOVASCULAR SCREENING; LDL GOAL LESS THAN 130     Psychophysiologic insomnia     Pulmonary nodule     PSEUDOPHAKIA OU     PVD (POSTERIOR VITREOUS DETACHMENT) OU     PXF (PSEUDOEXFOLIATION OF LENS CAPSULE) OD     GERD (gastroesophageal reflux disease)     Mild major depression (H)     Hyperlipidemia LDL goal <100     Advance Care Planning     Neuropathy (H)     SHERRY (obstructive sleep apnea)-severe (AHI 35)     zEncounter for counseling     Anemia of chronic disease     Migraine     Osteoporosis     Cornea guttata, ou     Conjunctival concretions     Stenosis, spinal, lumbar     Other chronic pain     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     MGD (meibomian gland dysfunction)     Blepharitis of both eyes     Personal history of healed osteoporosis fracture     Iron malabsorption     Hyperglycemia     Morbid obesity due to excess calories (H)     Chronic bilateral low back pain without sciatica     Past Medical History     Past Medical History   Diagnosis Date     Menopause late 40's     Ex-smoker 01/99     Vitamin B12 deficiency      Acute posthemorrhagic anemia 10/13/2012     History of total hip  replacement 10/11/2012     History of total knee replacement 7/23/2009     Pelvic fracture (H) 5/13/2014     PUD (peptic ulcer disease)      History of blood transfusion      Past Surgical History     Past Surgical History   Procedure Laterality Date     C/section, low transverse  [age]     x 2     C nonspecific procedure  89     left foot surgery     C nonspecific procedure  89     R MCP surgery     Breast biopsy, rt/lt       left benign     Arthroscopy knee rt/lt  01/06     left     C total knee arthroplasty  2006     left     C hand/finger surgery unlisted  11/05     right hand     Colonoscopy  2006,2009     C anesth,total hip arthroplasty       Rt hip     Hc esoph/gas reflux test w nasal imped >1 hr  2/1/2012     Procedure:ESOPHAGEAL IMPEDENCE FUNCTION TEST WITH 24 HOUR PH GREATER THAN 1 HOUR; Surgeon:KAYKAY JULIO; Location:[facility]     Ir caudal epidural injection single  5/2/2012     LESI L5-S1 at [facility]     Surgical history of -   2007     right knee total replacement     Abdomen surgery       c-sections     Breast surgery  90's     lumpectomy     Eye surgery       cataracs     Back surgery  2013     disc     Cataract iol, rt/lt       Allergy     Allergies   Allergen Reactions     Abatacept      Adhesive Tape      Plastic tape     Celebrex [Celecoxib]      Ineffective     Ethanol      Antihistamines     Orencia [Abatacept]      Headache     Septra [Bactrim]      Sulfa Drugs      Tramadol      Headache     Vicodin [Hydrocodone-Acetaminophen]      Current Medication List     Current Outpatient Prescriptions   Medication Sig     sertraline (ZOLOFT) 25 MG tablet Take 25 mg by mouth     atorvastatin (LIPITOR) 40 MG tablet TAKE ONE TABLET BY MOUTH EVERY DAY     DULoxetine (CYMBALTA) 60 MG EC capsule Take 1 capsule (60 mg) by mouth 2 times daily     pantoprazole (PROTONIX) 40 MG enteric coated tablet TAKE ONE TABLET BY MOUTH ONCE DAILY 30-60 MINUTES BEFORE A MEAL     order for [facility] [employer] REMSTAR 60 Series  Auto CPAP 12-15 cm H2O, Wisp nasal mask w/a s/m cushion     VITAMIN E PO Take 2,000 Units by mouth     fluticasone (FLONASE) 50 MCG/ACT nasal spray Spray 2 sprays into both nostrils daily     Omega-3 Fatty Acids (OMEGA-3 FISH OIL PO) Take 2,000 mg by mouth daily      methotrexate 2.5 MG tablet Take 10 tablets (25 mg) by mouth once a week .  Split dose between AM and PM (5 tabs in the morning, and 5 tabs in the evening; all taken within the same day each week)     folic acid (FOLVITE) 1 MG tablet Take 1 tablet (1 mg) by mouth daily     multivitamin, therapeutic with minerals (MULTI-VITAMIN) TABS Take 1 tablet by mouth daily     magnesium oxide (MAG-OX) 400 (241.3 MG) MG tablet Take 1 tablet (400 mg) by mouth daily     propylene glycol (SYSTANE BALANCE) 0.6 % SOLN ophthalmic solution Place 1 drop into both eyes 4 times daily     sodium chloride (BIRDIE 128) 5 % ophthalmic ointment Apply small amount to both eyelids at bedtime     NEW MED Hypo-chlor spray soln. Use BID on eyelids     topiramate (TOPAMAX) 25 MG tablet Take 1 in the morning and 2 at bedtime.     tofacitinib (XELJANZ XR) 11 MG XR tablet Take 1 tablet (11 mg) by mouth daily     albuterol (2.5 MG/3ML) 0.083% nebulizer solution Take 1 vial (2.5 mg) by nebulization every 6 hours as needed for shortness of breath / dyspnea or wheezing     ferrous gluconate (FERGON) 324 (38 FE) MG tablet Take 1 tablet (324 mg) by mouth 3 times daily (with meals) (Patient taking differently: Take 324 mg by mouth 2 times daily )     cyanocobalamin 500 MCG TABS Take 1 tablet by mouth 2 times daily     Calcium Citrate-Vitamin D (CALCIUM CITRATE + PO) Take 2 tablets by mouth daily     Cholecalciferol (VITAMIN D-3 PO) Take 1,000 Units by mouth 2 times daily     Ascorbic Acid 1000 MG TABS Take 1 tablet by mouth 3 times daily     acetaminophen (TYLENOL) 500 MG tablet Take 2 tablets (1,000 mg) by mouth every 8 hours as needed for pain     vitamin  B complex with vitamin C (VITAMIN  B  "COMPLEX) TABS Take 1 tablet by mouth daily.     Calcium Carbonate Antacid (TUMS PO) Take  by mouth as needed.     cetirizine (ZYRTEC) 10 MG tablet Take 1 tablet by mouth daily.     aspirin 81 MG tablet Take 1 tablet by mouth daily.     No current facility-administered medications for this visit.     Social History   See HPI    Family History     Family History   Problem Relation Age of Onset     Arthritis Mother      Alzheimer Disease Mother      HEART DISEASE Father      MI ( from this)     Alcohol/Drug Father      Arthritis Sister      Hypertension Sister      CANCER Son      Neurologic Disorder Sister      Schizophrenic     DIABETES Son      DIABETES Son      Hypertension Sister      Hyperlipidemia Mother      Hyperlipidemia Sister      Other Cancer Son      MENTAL ILLNESS Sister      OSTEOPOROSIS Mother      No change in family history since the previous clinic visit.    Physical Exam     Temp Readings from Last 3 Encounters:   02/10/17 96.7  F (35.9  C) Oral   17 97.8  F (36.6  C) Oral   16 97.3  F (36.3  C) Oral     BP Readings from Last 5 Encounters:   02/10/17 120/60   17 120/86   16 116/70   16 116/68   16 114/62     Pulse Readings from Last 1 Encounters:   02/10/17 98     Resp Readings from Last 1 Encounters:   16 16     Estimated body mass index is 33.34 kg/(m^2) as calculated from the following:    Height as of 17: 1.657 m (5' 5.25\").    Weight as of this encounter: 91.536 kg (201 lb 12.8 oz).    GEN: NAD, overweight  HEENT: MMM. No oral lesions. Anicteric, noninjected sclera  CV: S1, S2. RRR. No m/r/g.  PULM: CTA bilaterally. No w/c.  MSK: Mild synovial swelling without tenderness to palpation of the bilateral second-third MCPs. PIPs and DIPs without tenderness to palpation or swelling. Mild subluxation of the bilateral MCPs. Right hand with well healed old surgical scars over the MCPs. Bilateral wrists without synovial swelling or tenderness to " palpation. Bilateral shoulders with impingement; right shoulder mildly tender to palpation. Bilateral hips nontender to palpation.  Bilateral knees and ankles without swelling or tenderness palpation. Negative MTP squeeze. All joints without increased warmth.  SKIN: No rash  EXT: No LE edema  PSYCH: Alert. Appropriate.    Labs   RF/CCP  Recent Labs   Lab Test  09/09/11   0843  03/20/09   1211   CYCLICCITPEP   --   >100 Interpretation:  Positive*   RHF  38*  163*     ELLY/RNP/Sm/SSA/SSB/dsDNA  Recent Labs   Lab Test  07/24/13   1024  09/09/11   0843   ENASSA  8  2   ENASSB  0  0   W3YOSTX   --   180   [id number]   --   29     CBC  Recent Labs   Lab Test  02/08/17   1241  01/27/17   1511  10/28/16   1239   WBC  4.6  5.1  5.1   RBC  3.65*  3.89  3.88   HGB  11.5*  11.9  12.0   HCT  36.4  38.5  38.4   MCV  100  99  99   RDW  15.9*  15.4*  17.1*   PLT  364  408  339   MCH  31.5  30.6  30.9   MCHC  31.6  30.9*  31.3*   NEUTROPHIL  54.8  46.8  69.5   LYMPH  25.2  31.6  15.8   MONOCYTE  13.7  14.1  11.7   EOSINOPHIL  6.1  7.3  3.0   BASOPHIL  0.2  0.2  0.0   ANEU  2.5  2.4  3.5   ALYM  1.2  1.6  0.8   MARYURI  0.6  0.7  0.6   AEOS  0.3  0.4  0.2   ABAS  0.0  0.0  0.0     CMP  Recent Labs   Lab Test  02/08/17   1241  01/27/17   1511  10/28/16   1239   04/29/16   1137   NA   --   142  143   --   138   POTASSIUM   --   4.1  4.3   --   4.3   CHLORIDE   --   107  109   --   103   CO2   --   25  27   --   26   ANIONGAP   --   10  7   --   9   GLC   --   98  97   --   101*   BUN   --   10  11   --   9   CR  0.80  0.62  0.70   < >  0.61   GFRESTIMATED  70  >90  Non  GFR Calc    81   < >  >90  Non  GFR Calc     GFRESTBLACK  85  >90   GFR Calc    >90   GFR Calc     < >  >90   GFR Calc     BRANDEE   --   9.9  9.4   --   9.3   BILITOTAL  0.2  0.3  0.4   < >  0.3   ALBUMIN  3.6  3.8  4.0   < >  3.6   PROTTOTAL  7.4  7.6  7.6   < >  8.1   ALKPHOS  41  45  46   < >  50    AST  21  35  22   < >  26   ALT  37  39  31   < >  29    < > = values in this interval not displayed.     HgA1c  Recent Labs   Lab Test  10/28/16   1238  04/29/16   1137   A1C  5.5  6.2*     Iron Studies  Recent Labs   Lab Test  02/15/16   1151  12/30/15   0842  03/26/14   1044   07/24/13   1024  04/13/12   1121   JUANA  93  16  10   < >  9*  17   IRON   --   31*   --    --   94  86   FEB   --   443*   --    --   413  345   IRONSAT   --   7*   --    --   23  25    < > = values in this interval not displayed.     Calcium/VitaminD  Recent Labs   Lab Test  01/27/17   1511  10/28/16   1239  04/29/16   1137   02/05/13   1050   BRANDEE  9.9  9.4  9.3   < >  9.3   VITDT   --   37   --    --   33    < > = values in this interval not displayed.     ESR/CRP  Recent Labs   Lab Test  02/08/17   1241  10/28/16   1239  08/10/16   1225   SED  34*  22  32*   CRP  7.0  3.0  6.9     CK/Aldolase  Recent Labs   Lab Test  04/13/12   0831  02/02/12   0843   CKT  106  48     TSH/T4  Recent Labs   Lab Test  01/27/17   1511  08/10/16   1225  12/30/15   0842   02/05/13   1050  11/14/11   1301   TSH  0.97  0.67  1.07   < >  0.57  0.89   T4  0.95   --    --    --   1.07  0.87    < > = values in this interval not displayed.     Lipid Panel  Recent Labs   Lab Test  10/28/16   1238  12/30/15   0842  01/13/14   1109  07/24/13   1024   04/13/12   0831   CHOL  176  182  135  177   --   167   TRIG  103  70  89  80   --   115   HDL  77  92  49*  89   --   59   LDL  78  76  68  72   < >  85   VLDL   --    --   18  16   --   23   CHOLHDLRATIO   --    --   2.8  2.0   --   2.8   NHDL  99  90   --    --    --    --     < > = values in this interval not displayed.     Hepatitis B  Recent Labs   Lab Test  09/15/15   1159  04/30/12   1005  05/22/09   1202   AUSAB  0.11   --    --    HBCAB  Nonreactive   --    --    HEPBANG  Nonreactive  Negative  Negative     Hepatitis C  Recent Labs   Lab Test  09/15/15   1159  04/30/12   1005  05/22/09   1202   HCVAB   "Nonreactive   Assay performance characteristics have not been established for newborns,   infants, and children    Negative  Negative     Tuberculosis Screening  Recent Labs   Lab Test  09/15/15   1200  04/30/12   1006   TBRSLT  Negative  Negative   TBAGN  0.00  0.00     \"HAND BILATERAL THREE OR MORE VIEWS 9/15/2015 12:28 PM   HISTORY: Rheumatoid arthritis; establish baseline. Rheumatoid  arthritis(714.0)  COMPARISON: None  IMPRESSION  IMPRESSION: There is diffuse osteopenia. Postoperative changes of the  right second and third metacarpophalangeal joints. Moderate joint  space narrowing involving the left second and third  metacarpophalangeal joints. Mild joint space narrowing of the right  fourth and fifth metacarpophalangeal joints. There are also small  periarticular erosive changes of the metacarpophalangeal joints. There  is fusion of several of the right carpal bones. Marked joint space  loss in the left wrist. Findings are consistent with the clinical  history of rheumatoid arthritis. Chronic fracture deformities of the  right distal radius and ulna. No acute fracture is seen.  SPENCER MONSALVE MD\"    Immunization History     Immunization History   Administered Date(s) Administered     Influenza (High Dose) 3 valent vaccine 10/28/2013, 09/23/2014, 11/11/2015, 09/23/2016     Influenza (IIV3) 10/14/2007, 10/21/2009     Mantoux 11/03/2006     Pneumococcal (PCV 13) 07/29/2015     Pneumococcal 23 valent 06/26/2000, 06/02/2010     TD (ADULT, 7+) 07/20/2009          Chart documentation done in part with Dragon Voice recognition Software. Although reviewed after completion, some word and grammatical error may remain.    Nico Byers MD  "

## 2017-02-10 NOTE — MR AVS SNAPSHOT
After Visit Summary   2/10/2017    Ginger Marshall    MRN: [id number]           Patient Information     Date Of Birth          1940        Visit Information        Provider Department      2/10/2017 10:00 AM Nico Byers MD [facility] [place]        Today's Diagnoses     Rheumatoid arthritis involving multiple sites with positive rheumatoid factor (H)    -  1     High risk medications (not anticoagulants) long-term use         Chronic midline low back pain, with sciatica presence unspecified            Follow-ups after your visit        Additional Services     NEUROSURGERY REFERRAL       Your provider has referred you to:   FM[facility]: [facility] [phone]   [email or web address]    Please be aware that coverage of these services is subject to the terms and limitations of your health insurance plan.  Call member services at your health plan with any benefit or coverage questions.      Please bring the following with you to your appointment:    (1) Any X-Rays, CTs or MRIs which have been performed.  Contact the facility where they were done to arrange for  prior to your scheduled appointment.   (2) List of current medications  (3) This referral request   (4) Any documents/labs given to you for this referral                  Your next 10 appointments already scheduled     Feb 10, 2017 12:20 PM   Office Visit with AKIRA Smith CNP   [facility] [place] ([address] [address]   [phone]           Bring a current list of meds and any records pertaining to this visit.  For Physicals, please bring immunization records and any forms needing to be filled out.  Please arrive 10 minutes early to complete paperwork.            Mar 01, 2017 11:30 AM   Office Visit with Georgia Vázquez MD   [facility] ([facility])    [address] [place] [address]    [place] [address]   [phone]           Bring a current list of meds and any records pertaining to this visit.  For Physicals, please bring immunization records and any forms needing to be filled out.  Please arrive 10 minutes early to complete paperwork.            May 05, 2017 11:00 AM   LAB with [facility]   [facility]    [address] [place] [address]   [phone]           Patient must bring picture ID.  Patient should be prepared to give a urine specimen  Please do not eat 10-12 hours before your appointment if you are coming in fasting for labs on lipids, cholesterol, or glucose (sugar).  Pregnant women should follow their Care Team instructions. Water with medications is okay. Do not drink coffee or other fluids.   If you have concerns about taking  your medications, please ask at office or if scheduling via [employer], send a message by clicking on Secure Messaging, Message Your Care Team.            May 10, 2017 10:40 AM   [facility] Visit with Nico Byers MD   [facility]    [address] [place] [address] [address]   [phone]            Sep 25, 2017  1:00 PM   [facility] Visit with Arlet Gonzalez MD   [facility]    [address] [place] [id number] [phone]              Future tests that were ordered for you today     Open Future Orders        Priority Expected Expires Ordered    CBC with platelets differential Routine 5/7/2017 5/26/2017 2/10/2017    Creatinine Routine 5/7/2017 5/26/2017 2/10/2017    CRP inflammation Routine 5/7/2017 5/26/2017 2/10/2017    Erythrocyte sedimentation rate auto Routine 5/7/2017 5/26/2017 2/10/2017    Hepatic panel Routine 5/7/2017 5/26/2017 2/10/2017            Who to contact     If you have questions or need follow up information about today's clinic visit or your schedule please contact [facility] directly at [phone].  Normal or non-critical lab and imaging results will be communicated to you by MyChart, letter or phone within 4 business days after the clinic has received the results. If you do not hear from us within 7 days, please contact the clinic through [employer] or phone. If you have a critical or abnormal lab result, we will notify you by phone as soon as possible.  Submit refill requests through [employer] or call your pharmacy and they will forward the refill request to us. Please allow 3 business days for your refill to be completed.          Additional Information About Your Visit        [employer] Information     [employer] gives you secure access to your electronic health record. If you see a primary care provider, you can also send messages to your care team and make appointments. If you have questions, please call your primary care clinic.  If you do not have a primary care provider, please call [phone] and they will assist you.        Care EveryWhere ID     This is your Care EveryWhere ID. This could be used by other organizations to access your [place] medical records  [id number]        Your Vitals Were     Pulse Temperature Pulse Oximetry             98 96.7  F (35.9  C) (Oral) 98%          Blood Pressure from Last 3 Encounters:   02/10/17 120/60   01/27/17 120/86   12/30/16 116/70    Weight from Last 3 Encounters:   02/10/17 91.536 kg (201 lb 12.8 oz)   01/27/17 91.173 kg (201 lb)   12/30/16 93.668 kg (206 lb 8 oz)              We Performed the Following     NEUROSURGERY REFERRAL          Today's Medication Changes          These changes are accurate as of: 2/10/17 10:38 AM.  If you have any questions, ask your nurse or doctor.               These medicines have changed or have updated prescriptions.        Dose/Directions    ferrous gluconate 324 (38 FE) MG tablet   Commonly known as:  FERGON   This may have changed:  when to take this   Used for:  Low iron        Dose:   324 mg   Take 1 tablet (324 mg) by mouth 3 times daily (with meals)   Quantity:  270 tablet   Refills:  1       methotrexate 2.5 MG tablet   This may have changed:  additional instructions   Used for:  Rheumatoid arthritis involving multiple sites with positive rheumatoid factor (H), High risk medications (not anticoagulants) long-term use   Changed by:  Nico Byers MD        Dose:  25 mg   Take 10 tablets (25 mg) by mouth once a week   Quantity:  120 tablet   Refills:  1            Where to get your medicines      These medications were sent to [facility] [place], [place] - [address]  [address], [facility] [address]     Phone:  [phone]    - methotrexate 2.5 MG tablet             Primary Care Provider Office Phone # Fax #    Georgia Vázquez [phone]       [facility] [address] [address]        Thank you!     Thank you for choosing [facility]  for your care. Our goal is always to provide you with excellent care. Hearing back from our patients is one way we can continue to improve our services. Please take a few minutes to complete the written survey that you may receive in the mail after your visit with us. Thank you!             Your Updated Medication List - Protect others around you: Learn how to safely use, store and throw away your medicines at www.disposemymeds.org.          This list is accurate as of: 2/10/17 10:38 AM.  Always use your most recent med list.                   Brand Name Dispense Instructions for use    albuterol (2.5 MG/3ML) 0.083% neb solution     180 mL    Take 1 vial (2.5 mg) by nebulization every 6 hours as needed for shortness of breath / dyspnea or wheezing       ascorbic acid 1000 MG Tabs tablet      Take 1 tablet by mouth 3 times daily       aspirin 81 MG tablet      Take 1 tablet by mouth daily.       atorvastatin 40 MG tablet    LIPITOR    90 tablet    TAKE ONE  TABLET BY MOUTH EVERY DAY       CALCIUM CITRATE + PO      Take 2 tablets by mouth daily       cetirizine 10 MG tablet    zyrTEC    90 tablet    Take 1 tablet by mouth daily.       cyanocobalamin 500 MCG Tabs      Take 1 tablet by mouth 2 times daily       DULoxetine 60 MG EC capsule    CYMBALTA    180 capsule    Take 1 capsule (60 mg) by mouth 2 times daily       ferrous gluconate 324 (38 FE) MG tablet    FERGON    270 tablet    Take 1 tablet (324 mg) by mouth 3 times daily (with meals)       fluticasone 50 MCG/ACT spray    FLONASE    16 g    Spray 2 sprays into both nostrils daily       folic acid 1 MG tablet    FOLVITE    100 tablet    Take 1 tablet (1 mg) by mouth daily       magnesium oxide 400 (241.3 MG) MG tablet    MAG-OX     Take 1 tablet (400 mg) by mouth daily       methotrexate 2.5 MG tablet     120 tablet    Take 10 tablets (25 mg) by mouth once a week       Multi-vitamin Tabs tablet      Take 1 tablet by mouth daily       NEW MED      Hypo-chlor spray soln. Use BID on eyelids       OMEGA-3 FISH OIL PO      Take 2,000 mg by mouth daily       [place] [facility]      Respironics REMSTAR 60 Series Auto CPAP 12-15 cm H2O, Wisp nasal mask w/a s/m cushion       pantoprazole 40 MG EC tablet    PROTONIX    90 tablet    TAKE ONE TABLET BY MOUTH ONCE DAILY 30-60 MINUTES BEFORE A MEAL       propylene glycol 0.6 % Soln ophthalmic solution    SYSTANE BALANCE     Place 1 drop into both eyes 4 times daily       sodium chloride 5 % ophthalmic ointment    BIRDIE 128     Apply small amount to both eyelids at bedtime       tofacitinib 11 MG 24 hr tablet    XELJANZ XR    90 tablet    Take 1 tablet (11 mg) by mouth daily       topiramate 25 MG tablet    TOPAMAX    270 tablet    Take 1 in the morning and 2 at bedtime.       TUMS PO      Take  by mouth as needed.       TYLENOL 500 MG tablet   Generic drug:  acetaminophen     100 tablet    Take 2 tablets (1,000 mg) by mouth every 8 hours as needed for pain       vitamin B complex with  vitamin C Tabs tablet      Take 1 tablet by mouth daily.       VITAMIN D-3 PO      Take 1,000 Units by mouth 2 times daily       VITAMIN E PO      Take 2,000 Units by mouth       ZOLOFT 25 MG tablet   Generic drug:  sertraline      Take 25 mg by mouth

## 2017-02-10 NOTE — PROGRESS NOTES
SUBJECTIVE:                                                    Ginger Marshall is a 76 year old female who presents to clinic today for the following health issues:      ENT Symptoms             Symptoms: cc Present Absent Comment   Fever/Chills   x    Fatigue   x    Muscle Aches   x    Eye Irritation  x     Sneezing  x     Nasal Brandon/Drg  x     Sinus Pressure/Pain   x Has headache   Loss of smell   x    Dental pain   x    Sore Throat   x    Swollen Glands   x    Ear Pain/Fullness   x    Cough   x    Wheeze   x    Chest Pain   x    Shortness of breath   x    Rash   x    Other   x      Symptom duration:  x3 weeks   Symptom severity:  mild   Treatments tried:  Doxycycline   Contacts:  None     Symptoms improved, only to return again.    Problem list and histories reviewed & adjusted, as indicated.  Additional history: as documented    Patient Active Problem List   Diagnosis     RA (Rheumatoid Arthritis) off Plaquenil     Menopause     History of colonic polyps     Mitral Regurgitation     Multinodular goiter     CARDIOVASCULAR SCREENING; LDL GOAL LESS THAN 130     Psychophysiologic insomnia     Pulmonary nodule     PSEUDOPHAKIA OU     PVD (POSTERIOR VITREOUS DETACHMENT) OU     PXF (PSEUDOEXFOLIATION OF LENS CAPSULE) OD     GERD (gastroesophageal reflux disease)     Mild major depression (H)     Hyperlipidemia LDL goal <100     Advance Care Planning     Neuropathy (H)     SHERRY (obstructive sleep apnea)-severe (AHI 35)     zEncounter for counseling     Anemia of chronic disease     Migraine     Osteoporosis     Cornea guttata, ou     Conjunctival concretions     Stenosis, spinal, lumbar     Other chronic pain     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     MGD (meibomian gland dysfunction)     Blepharitis of both eyes     Personal history of healed osteoporosis fracture     Iron malabsorption     Hyperglycemia     Morbid obesity due to excess calories (H)     Chronic bilateral low back pain without sciatica      Past Surgical History   Procedure Laterality Date     C/section, low transverse  66,74     x 2     C nonspecific procedure  89     left foot surgery     C nonspecific procedure  95     R MCP surgery     Breast biopsy, rt/lt       left benign     Arthroscopy knee rt/lt       left     C total knee arthroplasty  2006     left     C hand/finger surgery unlisted       right hand     Colonoscopy  ,     C anesth,total hip arthroplasty       Rt hip     Hc esoph/gas reflux test w nasal imped >1 hr  2012     Procedure:ESOPHAGEAL IMPEDENCE FUNCTION TEST WITH 24 HOUR PH GREATER THAN 1 HOUR; Surgeon:KAYKAY JULIO; Location:[facility]     Ir caudal epidural injection single  2012     LESI L5-S1 at [facility]     Surgical history of -        right knee total replacement     Abdomen surgery       c-sections     Breast surgery       lumpectomy     Eye surgery       cataracs     Back surgery       disc     Cataract iol, rt/lt         Social History   Substance Use Topics     Smoking status: Former Smoker -- 1.00 packs/day for 41 years     Types: Cigarettes     Quit date: 1999     Smokeless tobacco: Never Used     Alcohol Use: 0.0 oz/week      Comment: Periodically.     Family History   Problem Relation Age of Onset     Arthritis Mother      Alzheimer Disease Mother      HEART DISEASE Father      MI ( from this)     Alcohol/Drug Father      Arthritis Sister      Hypertension Sister      CANCER Son      Neurologic Disorder Sister      Schizophrenic     DIABETES Son      DIABETES Son      Hypertension Sister      Hyperlipidemia Mother      Hyperlipidemia Sister      Other Cancer Son      MENTAL ILLNESS Sister      OSTEOPOROSIS Mother          Current Outpatient Prescriptions   Medication Sig Dispense Refill     sertraline (ZOLOFT) 25 MG tablet Take 25 mg by mouth       methotrexate 2.5 MG tablet Take 10 tablets (25 mg) by mouth once a week 120 tablet 1     atorvastatin (LIPITOR) 40 MG tablet  TAKE ONE TABLET BY MOUTH EVERY DAY 90 tablet 2     pantoprazole (PROTONIX) 40 MG enteric coated tablet TAKE ONE TABLET BY MOUTH ONCE DAILY 30-60 MINUTES BEFORE A MEAL 90 tablet 1     order for [facility] Resp[employer] REMSTAR 60 Series Auto CPAP 12-15 cm H2O, Wisp nasal mask w/a s/m cushion       VITAMIN E PO Take 2,000 Units by mouth       fluticasone (FLONASE) 50 MCG/ACT nasal spray Spray 2 sprays into both nostrils daily 16 g 3     Omega-3 Fatty Acids (OMEGA-3 FISH OIL PO) Take 2,000 mg by mouth daily        folic acid (FOLVITE) 1 MG tablet Take 1 tablet (1 mg) by mouth daily 100 tablet 3     multivitamin, therapeutic with minerals (MULTI-VITAMIN) TABS Take 1 tablet by mouth daily       magnesium oxide (MAG-OX) 400 (241.3 MG) MG tablet Take 1 tablet (400 mg) by mouth daily       propylene glycol (SYSTANE BALANCE) 0.6 % SOLN ophthalmic solution Place 1 drop into both eyes 4 times daily       sodium chloride (BIRDIE 128) 5 % ophthalmic ointment Apply small amount to both eyelids at bedtime  0     NEW MED Hypo-chlor spray soln. Use BID on eyelids       topiramate (TOPAMAX) 25 MG tablet Take 1 in the morning and 2 at bedtime. 270 tablet 4     tofacitinib (XELJANZ XR) 11 MG XR tablet Take 1 tablet (11 mg) by mouth daily 90 tablet 3     albuterol (2.5 MG/3ML) 0.083% nebulizer solution Take 1 vial (2.5 mg) by nebulization every 6 hours as needed for shortness of breath / dyspnea or wheezing 180 mL 2     ferrous gluconate (FERGON) 324 (38 FE) MG tablet Take 1 tablet (324 mg) by mouth 3 times daily (with meals) (Patient taking differently: Take 324 mg by mouth 2 times daily ) 270 tablet 1     cyanocobalamin 500 MCG TABS Take 1 tablet by mouth 2 times daily       Calcium Citrate-Vitamin D (CALCIUM CITRATE + PO) Take 2 tablets by mouth daily       Cholecalciferol (VITAMIN D-3 PO) Take 1,000 Units by mouth 2 times daily       Ascorbic Acid 1000 MG TABS Take 1 tablet by mouth 3 times daily       acetaminophen (TYLENOL) 500 MG tablet  Take 2 tablets (1,000 mg) by mouth every 8 hours as needed for pain 100 tablet 0     vitamin  B complex with vitamin C (VITAMIN  B COMPLEX) TABS Take 1 tablet by mouth daily.  0     Calcium Carbonate Antacid (TUMS PO) Take  by mouth as needed.       cetirizine (ZYRTEC) 10 MG tablet Take 1 tablet by mouth daily. 90 tablet 3     aspirin 81 MG tablet Take 1 tablet by mouth daily.       [DISCONTINUED] methotrexate 2.5 MG tablet Take 10 tablets (25 mg) by mouth once a week .  Split dose between AM and PM (5 tabs in the morning, and 5 tabs in the evening; all taken within the same day each week) 120 tablet 1     Allergies   Allergen Reactions     Abatacept      Adhesive Tape      Plastic tape     Celebrex [Celecoxib]      Ineffective     Ethanol      Antihistamines     Orencia [Abatacept]      Headache     Septra [Bactrim]      Sulfa Drugs      Tramadol      Headache     Vicodin [Hydrocodone-Acetaminophen]      BP Readings from Last 3 Encounters:   02/10/17 112/68   02/10/17 120/60   01/27/17 120/86    Wt Readings from Last 3 Encounters:   02/10/17 202 lb 9.6 oz (91.899 kg)   02/10/17 201 lb 12.8 oz (91.536 kg)   01/27/17 201 lb (91.173 kg)                  Problem list, Medication list, Allergies, and Medical/Social/Surgical histories reviewed in EPIC and updated as appropriate.    ROS:  Constitutional, HEENT, cardiovascular, pulmonary, gi and gu systems are negative, except as otherwise noted.    OBJECTIVE:                                                    /68 mmHg  Pulse 95  Temp(Src) 97  F (36.1  C) (Oral)  Wt 202 lb 9.6 oz (91.899 kg)  SpO2 94%  Breastfeeding? No  Body mass index is 33.47 kg/(m^2).  GENERAL: healthy, alert and no distress  EYES: Eyes grossly normal to inspection, PERRL and conjunctivae and sclerae normal  HENT: normal cephalic/atraumatic, ear canals and TM's normal, nose and mouth without ulcers or lesions, nasal mucosa edematous , oropharynx clear and oral mucous membranes moist  NECK:  no adenopathy, no asymmetry, masses, or scars and thyroid normal to palpation  RESP: lungs clear to auscultation - no rales, rhonchi or wheezes  CV: regular rate and rhythm, normal S1 S2, no S3 or S4, no murmur, click or rub, no peripheral edema and peripheral pulses strong  MS: no gross musculoskeletal defects noted, no edema    Diagnostic Test Results:  none      ASSESSMENT/PLAN:                                                      1. Acute sinusitis with symptoms > 10 days    - cefdinir (OMNICEF) 300 MG capsule; Take 1 capsule (300 mg) by mouth 2 times daily  Dispense: 20 capsule; Refill: 0    FUTURE APPOINTMENTS:       - Follow-up for annual visit or as needed    AKIRA Jones CNP  [facility]

## 2017-02-10 NOTE — PATIENT INSTRUCTIONS
[facility]    If you have any questions regarding to your visit please contact your care team:     Team Pink:   Clinic Hours Telephone Number   Internal Medicine:  Dr. Georgia Flores NP       7am-7pm  Monday - Thursday   7am-5pm  Fridays  [phone]  (Appointment scheduling available 24/7)    Questions about your visit?  Team Line  [phone]   Urgent Care - [place] [facility] - 11am-9pm Monday-Friday Saturday-Sunday- 9am-5pm   [place] - 5pm-9pm Monday-Friday Saturday-Sunday- 9am-5pm  [phone] - [place]   [phone] - [place]       What options do I have for visits at the clinic other than the traditional office visit?  To expand how we care for you, many of our providers are utilizing electronic visits (e-visits) and telephone visits, when medically appropriate, for interactions with their patients rather than a visit in the clinic.   We also offer nurse visits for many medical concerns. Just like any other service, we will bill your insurance company for this type of visit based on time spent on the phone with your provider. Not all insurance companies cover these visits. Please check with your medical insurance if this type of visit is covered. You will be responsible for any charges that are not paid by your insurance.      E-visits via [employer]:  generally incur a $35.00 fee.  Telephone visits:  Time spent on the phone: *charged based on time that is spent on the phone in increments of 10 minutes. Estimated cost:   5-10 mins $30.00   11-20 mins. $59.00   21-30 mins. $85.00   Use [employer] (secure email communication and access to your chart) to send your primary care provider a message or make an appointment. Ask someone on your Team how to sign up for [employer].    For a Price Quote for your services, please call our Consumer Price Line at [phone].    As always, Thank you for trusting us with your health care  needs!    Melinda SHULTZ CMA ([facility])

## 2017-02-11 ASSESSMENT — ANXIETY QUESTIONNAIRES: GAD7 TOTAL SCORE: 1

## 2017-02-11 ASSESSMENT — PATIENT HEALTH QUESTIONNAIRE - PHQ9: SUM OF ALL RESPONSES TO PHQ QUESTIONS 1-9: 7

## 2017-02-17 ENCOUNTER — TRANSFERRED RECORDS (OUTPATIENT)
Dept: HEALTH INFORMATION MANAGEMENT | Facility: CLINIC | Age: 77
End: 2017-02-17

## 2017-02-22 ENCOUNTER — OFFICE VISIT (OUTPATIENT)
Dept: OPHTHALMOLOGY | Facility: CLINIC | Age: 77
End: 2017-02-22
Payer: COMMERCIAL

## 2017-02-22 DIAGNOSIS — H11.122: Primary | ICD-10-CM

## 2017-02-22 PROCEDURE — 65210 REMOVE FOREIGN BODY FROM EYE: CPT | Performed by: OPHTHALMOLOGY

## 2017-02-22 ASSESSMENT — VISUAL ACUITY
METHOD: SNELLEN - LINEAR
CORRECTION_TYPE: GLASSES
OD_CC: 20/20
OS_CC: 20/20

## 2017-02-22 NOTE — MR AVS SNAPSHOT
After Visit Summary   2/22/2017    Ginger Marshall    MRN: [id number]           Patient Information     Date Of Birth          1940        Visit Information        Provider Department      2/22/2017 4:15 PM Johnny Calzada MD [facility]        Care Instructions    Conjunctival concretions removed from left upper lid tarsal surface with spatula.  Use artificial tears up to 4 times daily  Return visit in June for complete exam.  Johnny Calzada M.D.          Follow-ups after your visit        Your next 10 appointments already scheduled     Feb 23, 2017  2:00 PM CST   New Visit with Carmen Fierro NP   [facility] ([facility]    [address] [place] [id number] [phone]            Mar 01, 2017 11:30 AM CST   Office Visit with Georgia Vázquez MD   [facility] ([facility])    [address] [place] [id number] [phone]           Bring a current list of meds and any records pertaining to this visit.  For Physicals, please bring immunization records and any forms needing to be filled out.  Please arrive 10 minutes early to complete paperwork.            May 05, 2017 11:00 AM CDT   LAB with [facility]   [facility] ([facility])    [address] [place] [id number] [phone]           Patient must bring picture ID.  Patient should be prepared to give a urine specimen  Please do not eat 10-12 hours before your appointment if you are coming in fasting for labs on lipids, cholesterol, or glucose (sugar).  Pregnant women should follow their Care Team instructions. Water with medications is okay. Do not drink coffee or other fluids.   If you have concerns about taking  your medications, please ask at office or if scheduling via [employer], send a message by clicking on Secure Messaging, Message Your Care Team.            May 10, 2017 10:40 AM CDT   Return Visit  with Nico Byers MD   [facility]    [address]  [place] [phone]            Sep 25, 2017  1:00 PM CDT   Return Visit with Arlet Gonzalez MD   [facility] ([facility]    [id number] [place] [id number] [phone]              Who to contact     If you have questions or need follow up information about today's clinic visit or your schedule please contact [facility] directly at [phone].  Normal or non-critical lab and imaging results will be communicated to you by MyChart, letter or phone within 4 business days after the clinic has received the results. If you do not hear from us within 7 days, please contact the clinic through [employer] or phone. If you have a critical or abnormal lab result, we will notify you by phone as soon as possible.  Submit refill requests through [employer] or call your pharmacy and they will forward the refill request to us. Please allow 3 business days for your refill to be completed.          Additional Information About Your Visit        MyChart Information     [employer] gives you secure access to your electronic health record. If you see a primary care provider, you can also send messages to your care team and make appointments. If you have questions, please call your primary care clinic.  If you do not have a primary care provider, please call [phone] and they will assist you.        Care EveryWhere ID     This is your Care EveryWhere ID. This could be used by other organizations to access your [place] medical records  [id number]         Blood Pressure from Last 3 Encounters:   02/10/17 112/68   02/10/17 120/60   01/27/17 120/86    Weight from Last 3 Encounters:   02/10/17 91.9 kg (202 lb 9.6 oz)   02/10/17 91.5 kg (201 lb 12.8 oz)   01/27/17 91.2 kg (201 lb)              Today, you had the following     No orders found for display          Today's Medication Changes          These changes are accurate as of: 2/22/17  4:41 PM.  If you have any questions, ask your nurse or doctor.               These medicines have changed or have updated prescriptions.        Dose/Directions    ferrous gluconate 324 (38 FE) MG tablet   Commonly known as:  FERGON   This may have changed:  when to take this   Used for:  Low iron        Dose:  324 mg   Take 1 tablet (324 mg) by mouth 3 times daily (with meals)   Quantity:  270 tablet   Refills:  1                Primary Care Provider Office Phone # Fax #    Georgia Vázquez [phone]       [facility] [address] [place] [id number]        Thank you!     Thank you for choosing [facility]  for your care. Our goal is always to provide you with excellent care. Hearing back from our patients is one way we can continue to improve our services. Please take a few minutes to complete the written survey that you may receive in the mail after your visit with us. Thank you!             Your Updated Medication List - Protect others around you: Learn how to safely use, store and throw away your medicines at www.disposemymeds.org.          This list is accurate as of: 2/22/17  4:41 PM.  Always use your most recent med list.                   Brand Name Dispense Instructions for use    albuterol (2.5 MG/3ML) 0.083% neb solution     180 mL    Take 1 vial (2.5 mg) by nebulization every 6 hours as needed for shortness of breath / dyspnea or wheezing       ascorbic acid 1000 MG Tabs tablet      Take 1 tablet by mouth 3 times daily       aspirin 81 MG tablet      Take 1 tablet by mouth daily.       atorvastatin 40 MG tablet    LIPITOR    90 tablet    TAKE ONE TABLET BY MOUTH EVERY DAY       CALCIUM CITRATE + PO      Take 2 tablets by mouth daily       cefdinir 300 MG capsule    OMNICEF    20 capsule    Take 1 capsule (300 mg) by mouth 2 times daily       cetirizine 10 MG tablet     zyrTEC    90 tablet    Take 1 tablet by mouth daily.       cyanocobalamin 500 MCG Tabs      Take 1 tablet by mouth 2 times daily       ferrous gluconate 324 (38 FE) MG tablet    FERGON    270 tablet    Take 1 tablet (324 mg) by mouth 3 times daily (with meals)       fluticasone 50 MCG/ACT spray    FLONASE    16 g    Spray 2 sprays into both nostrils daily       folic acid 1 MG tablet    FOLVITE    100 tablet    Take 1 tablet (1 mg) by mouth daily       magnesium oxide 400 (241.3 MG) MG tablet    MAG-OX     Take 1 tablet (400 mg) by mouth daily       methotrexate 2.5 MG tablet     120 tablet    Take 10 tablets (25 mg) by mouth once a week       Multi-vitamin Tabs tablet      Take 1 tablet by mouth daily       NEW MED      Hypo-chlor spray soln. Use BID on eyelids       OMEGA-3 FISH OIL PO      Take 2,000 mg by mouth daily       order for DME      Respironics REMSTAR 60 Series Auto CPAP 12-15 cm H2O, Wisp nasal mask w/a s/m cushion       pantoprazole 40 MG EC tablet    PROTONIX    90 tablet    TAKE ONE TABLET BY MOUTH ONCE DAILY 30-60 MINUTES BEFORE A MEAL       propylene glycol 0.6 % Soln ophthalmic solution    SYSTANE BALANCE     Place 1 drop into both eyes 4 times daily       sodium chloride 5 % ophthalmic ointment    BIRDIE 128     Apply small amount to both eyelids at bedtime       tofacitinib 11 MG 24 hr tablet    XELJANZ XR    90 tablet    Take 1 tablet (11 mg) by mouth daily       topiramate 25 MG tablet    TOPAMAX    270 tablet    Take 1 in the morning and 2 at bedtime.       TUMS PO      Take  by mouth as needed.       TYLENOL 500 MG tablet   Generic drug:  acetaminophen     100 tablet    Take 2 tablets (1,000 mg) by mouth every 8 hours as needed for pain       vitamin B complex with vitamin C Tabs tablet      Take 1 tablet by mouth daily.       VITAMIN D-3 PO      Take 1,000 Units by mouth 2 times daily       VITAMIN E PO      Take 2,000 Units by mouth       ZOLOFT 25 MG tablet   Generic drug:  sertraline       Take 25 mg by mouth

## 2017-02-22 NOTE — PROGRESS NOTES
" Current Eye Medications:  Systane 4-5 X/day     Subjective:  Since last fall, feels like something in the left eye.  \"Dr. Stubbs use to remove lumps from under my lids\"  Patient wants them removed.     Objective:  See Ophthalmology Exam.       Assessment:  Tarsal conjunctival concretions left upper lid - possibly responsible for foreign body sensation.      Plan: Conjunctival concretions removed from left upper lid tarsal surface with spatula under Proparacaine.  Use artificial tears up to 4 times daily  Return visit in June for complete exam.  Johnny Calzada M.D.             "

## 2017-02-22 NOTE — PATIENT INSTRUCTIONS
Conjunctival concretions removed from left upper lid tarsal surface with spatula.  Use artificial tears up to 4 times daily  Return visit in June for complete exam.  Johnny Calzada M.D.

## 2017-02-23 ENCOUNTER — OFFICE VISIT (OUTPATIENT)
Dept: FAMILY MEDICINE | Facility: CLINIC | Age: 77
End: 2017-02-23
Payer: COMMERCIAL

## 2017-02-23 ENCOUNTER — OFFICE VISIT (OUTPATIENT)
Dept: NEUROSURGERY | Facility: CLINIC | Age: 77
End: 2017-02-23
Payer: COMMERCIAL

## 2017-02-23 VITALS
WEIGHT: 199.6 LBS | HEART RATE: 90 BPM | BODY MASS INDEX: 32.96 KG/M2 | DIASTOLIC BLOOD PRESSURE: 62 MMHG | SYSTOLIC BLOOD PRESSURE: 120 MMHG | TEMPERATURE: 97 F | OXYGEN SATURATION: 97 %

## 2017-02-23 VITALS
WEIGHT: 199 LBS | OXYGEN SATURATION: 96 % | BODY MASS INDEX: 32.86 KG/M2 | HEART RATE: 73 BPM | SYSTOLIC BLOOD PRESSURE: 117 MMHG | DIASTOLIC BLOOD PRESSURE: 74 MMHG | TEMPERATURE: 96.9 F

## 2017-02-23 DIAGNOSIS — M54.16 LUMBAR RADICULOPATHY: Primary | ICD-10-CM

## 2017-02-23 DIAGNOSIS — R09.81 SINUS CONGESTION: Primary | ICD-10-CM

## 2017-02-23 PROCEDURE — 99203 OFFICE O/P NEW LOW 30 MIN: CPT | Performed by: NURSE PRACTITIONER

## 2017-02-23 PROCEDURE — 99213 OFFICE O/P EST LOW 20 MIN: CPT | Performed by: INTERNAL MEDICINE

## 2017-02-23 RX ORDER — FEXOFENADINE HCL 180 MG/1
180 TABLET ORAL DAILY
Qty: 30 TABLET | Refills: 1 | Status: ON HOLD | OUTPATIENT
Start: 2017-02-23 | End: 2017-04-03

## 2017-02-23 NOTE — PROGRESS NOTES
SUBJECTIVE:                                                    Ginger Marshall is a 76 year old female who presents to clinic today for the following health issues:      PMH of RA on Xeljanz and MTX, and on nasal flonase,     ENT Symptoms             Symptoms: cc Present Absent Comment   Fever/Chills   x    Fatigue   x    Muscle Aches   x    Eye Irritation  x     Sneezing  x     Nasal Brandon/Drg  x  The secretions are clear. It seems to run mostly in the morning.      Sinus Pressure/Pain   x    Loss of smell   x    Dental pain   x    Sore Throat   x    Swollen Glands   x    Ear Pain/Fullness   x    Cough  x  A little tickle; its dry.    Wheeze   x    Chest Pain   x    Shortness of breath   x    Rash   x    Other   x      Symptom duration:  1 month   Symptom severity:  mild to moderate.   Treatments tried:  2 antibiotics, Flonase,   Contacts:  none       Patient has had the same symptoms throughout-she got doxycycline 1.27-2.6.17 and then cefdinir starting on 2.10.17-no improvement, perhaps her headache is a bit better.  The patient has been using the nasal Flonase without noted improvement.   She has not changed anything about her living conditions recently. No new foods.   She wonders if the warmer-than-usual weather may be connected to her symptoms.   She does have a cat and dog but she has had them for years.   She has not tried claritin or other antihistamines.    (she is on a PPI for GERD).  (of note, she has been on prednisone in the past for RA-she thus *can* tolerate this med, if need be).    Problem list and histories reviewed & adjusted, as indicated.  Additional history: as documented    Patient Active Problem List   Diagnosis     RA (Rheumatoid Arthritis) off Plaquenil     Menopause     History of colonic polyps     Mitral Regurgitation     Multinodular goiter     CARDIOVASCULAR SCREENING; LDL GOAL LESS THAN 130     Psychophysiologic insomnia     Pulmonary nodule     PSEUDOPHAKIA OU     PVD (POSTERIOR  VITREOUS DETACHMENT) OU     PXF (PSEUDOEXFOLIATION OF LENS CAPSULE) OD     GERD (gastroesophageal reflux disease)     Mild major depression (H)     Hyperlipidemia LDL goal <100     Advance Care Planning     Neuropathy (H)     SHERRY (obstructive sleep apnea)-severe (AHI 35)     zEncounter for counseling     Anemia of chronic disease     Migraine     Osteoporosis     Cornea guttata, ou     Conjunctival concretions     Stenosis, spinal, lumbar     Other chronic pain     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     MGD (meibomian gland dysfunction)     Blepharitis of both eyes     Personal history of healed osteoporosis fracture     Iron malabsorption     Hyperglycemia     Morbid obesity due to excess calories (H)     Chronic bilateral low back pain without sciatica     Past Surgical History   Procedure Laterality Date     C/section, low transverse  [age]     x 2     C nonspecific procedure  89     left foot surgery     C nonspecific procedure  95     R MCP surgery     Breast biopsy, rt/lt       left benign     Arthroscopy knee rt/lt  01/06     left     C total knee arthroplasty  2006     left     C hand/finger surgery unlisted  11/05     right hand     Colonoscopy  2006,2009     C anesth,total hip arthroplasty       Rt hip     Hc esoph/gas reflux test w nasal imped >1 hr  2/1/2012     Procedure:ESOPHAGEAL IMPEDENCE FUNCTION TEST WITH 24 HOUR PH GREATER THAN 1 HOUR; Surgeon:KAYKAY JULIO; Location:[facility]     Ir caudal epidural injection single  5/2/2012     LESI L5-S1 at [facility]     Surgical history of -   2007     right knee total replacement     Abdomen surgery       c-sections     Breast surgery  90's     lumpectomy     Eye surgery       cataracs     Back surgery  2013     disc     Cataract iol, rt/lt         Social History   Substance Use Topics     Smoking status: Former Smoker     Packs/day: 1.00     Years: 41.00     Types: Cigarettes     Quit date: 1/1/1999     Smokeless tobacco: Never Used     Alcohol  use 0.0 oz/week      Comment: Periodically.     Family History   Problem Relation Age of Onset     Arthritis Mother      Alzheimer Disease Mother      Hyperlipidemia Mother      OSTEOPOROSIS Mother      HEART DISEASE Father      MI ( from this)     Alcohol/Drug Father      Arthritis Sister      Hypertension Sister      CANCER Son      DIABETES Son      Neurologic Disorder Sister      Schizophrenic     Hypertension Sister      Hyperlipidemia Sister      MENTAL ILLNESS Sister      DIABETES Son      Other Cancer Son          Current Outpatient Prescriptions   Medication Sig Dispense Refill     fexofenadine (ALLEGRA) 180 MG tablet Take 1 tablet (180 mg) by mouth daily 30 tablet 1     sertraline (ZOLOFT) 25 MG tablet Take 25 mg by mouth       methotrexate 2.5 MG tablet Take 10 tablets (25 mg) by mouth once a week 120 tablet 1     atorvastatin (LIPITOR) 40 MG tablet TAKE ONE TABLET BY MOUTH EVERY DAY 90 tablet 2     pantoprazole (PROTONIX) 40 MG enteric coated tablet TAKE ONE TABLET BY MOUTH ONCE DAILY 30-60 MINUTES BEFORE A MEAL 90 tablet 1     order for [facility] Respironics REMSTAR 60 Series Auto CPAP 12-15 cm H2O, Wisp nasal mask w/a s/m cushion       VITAMIN E PO Take 2,000 Units by mouth       fluticasone (FLONASE) 50 MCG/ACT nasal spray Spray 2 sprays into both nostrils daily 16 g 3     Omega-3 Fatty Acids (OMEGA-3 FISH OIL PO) Take 2,000 mg by mouth daily        folic acid (FOLVITE) 1 MG tablet Take 1 tablet (1 mg) by mouth daily 100 tablet 3     multivitamin, therapeutic with minerals (MULTI-VITAMIN) TABS Take 1 tablet by mouth daily       magnesium oxide (MAG-OX) 400 (241.3 MG) MG tablet Take 1 tablet (400 mg) by mouth daily       propylene glycol (SYSTANE BALANCE) 0.6 % SOLN ophthalmic solution Place 1 drop into both eyes 4 times daily       sodium chloride (BIRDIE 128) 5 % ophthalmic ointment Apply small amount to both eyelids at bedtime  0     topiramate (TOPAMAX) 25 MG tablet Take 1 in the morning and 2 at  bedtime. 270 tablet 4     tofacitinib (XELJANZ XR) 11 MG XR tablet Take 1 tablet (11 mg) by mouth daily 90 tablet 3     ferrous gluconate (FERGON) 324 (38 FE) MG tablet Take 1 tablet (324 mg) by mouth 3 times daily (with meals) (Patient taking differently: Take 324 mg by mouth 2 times daily ) 270 tablet 1     cyanocobalamin 500 MCG TABS Take 1 tablet by mouth 2 times daily       Calcium Citrate-Vitamin D (CALCIUM CITRATE + PO) Take 2 tablets by mouth daily       Cholecalciferol (VITAMIN D-3 PO) Take 1,000 Units by mouth 2 times daily       Ascorbic Acid 1000 MG TABS Take 1 tablet by mouth 3 times daily       acetaminophen (TYLENOL) 500 MG tablet Take 2 tablets (1,000 mg) by mouth every 8 hours as needed for pain 100 tablet 0     vitamin  B complex with vitamin C (VITAMIN  B COMPLEX) TABS Take 1 tablet by mouth daily.  0     cetirizine (ZYRTEC) 10 MG tablet Take 1 tablet by mouth daily. 90 tablet 3     aspirin 81 MG tablet Take 1 tablet by mouth daily.       [DISCONTINUED] methotrexate 2.5 MG tablet Take 10 tablets (25 mg) by mouth once a week .  Split dose between AM and PM (5 tabs in the morning, and 5 tabs in the evening; all taken within the same day each week) 120 tablet 1     NEW MED Reported on 2/23/2017       Calcium Carbonate Antacid (TUMS PO) Take by mouth as needed Reported on 2/23/2017       ==============================================================  ROS:  Constitutional, HEENT, cardiovascular, pulmonary, GI, , musculoskeletal, neuro, skin, endocrine and psych systems are negative, except as otherwise noted.      OBJECTIVE:                                                    /62 (BP Location: Left arm, Patient Position: Chair, Cuff Size: Adult Regular)  Pulse 90  Temp 97  F (36.1  C) (Oral)  Wt 199 lb 9.6 oz (90.5 kg)  SpO2 97%  BMI 32.96 kg/m2  Body mass index is 32.96 kg/(m^2).     GENERAL APPEARANCE: healthy, alert and in no distress  EYES: Eyes grossly normal to inspection, and conjunctivae  and sclerae normal  HENT: ear canals and TM's normal, nose and mouth without ulcers or lesions, oropharynx clear and oral mucous membranes moist  NECK: no adenopathy, no asymmetry, masses, or scars   RESP: lungs clear to auscultation - no rales, rhonchi or wheezes  CV: regular rate and rhythm, normal S1 S2, no S3 or S4, no murmur, click or rub, no peripheral edema and peripheral pulses strong  ABDOMEN: soft, nontender, no hepatosplenomegaly, no masses and bowel sounds normal  MS: no musculoskeletal defects are noted and gait is age appropriate without ataxia  SKIN: no suspicious lesions or rashes  NEURO: mentation intact and speech normal  PSYCH: mentation appears normal and affect normal/bright.         ASSESSMENT/PLAN:                                                        ICD-10-CM    1. Sinus congestion R09.81 fexofenadine (ALLEGRA) 180 MG tablet     ASSESSMENT: allergic rhinitis vs a viral URTI which happened shortly after the last bout of sinusitis a few weeks ago; this patient may also be at higher than average risk for atypical sinus infections such as fungal infections...  PLAN:  Patient Instructions   Please stop Cetirizine and take Allegra instead.  Please call the clinic if you are not better in 10-14 days-we may try a 5 day course of 20mg of daily prednisone at that point.                   Martina Hong MD  [facility]

## 2017-02-23 NOTE — MR AVS SNAPSHOT
After Visit Summary   2/23/2017    Ginger Marshall    MRN: [id number]           Patient Information     Date Of Birth          1940        Visit Information        Provider Department      2/23/2017 12:10 PM Martina Hong MD [facility]        Today's Diagnoses     Sinus congestion    -  1      Care Instructions    Please stop Cetirizine and take Allegra instead.  Please call the clinic if you are not better in 10-14 days-we may try a 5 day course of 20mg of daily prednisone at that point.          Follow-ups after your visit        Your next 10 appointments already scheduled     Feb 23, 2017  2:00 PM CST   New Visit with Carmen Fierro NP   [facility])    [address] [place] [id number]   [phone]            Mar 01, 2017 11:30 AM CST   Office Visit with Georgia Vázquez MD   [address] [id number] [phone]           Bring a current list of meds and any records pertaining to this visit.  For Physicals, please bring immunization records and any forms needing to be filled out.  Please arrive 10 minutes early to complete paperwork.            May 05, 2017 11:00 AM CDT   LAB with [facility]   [facility])    [address] [place] [id number] [phone]           Patient must bring picture ID.  Patient should be prepared to give a urine specimen  Please do not eat 10-12 hours before your appointment if you are coming in fasting for labs on lipids, cholesterol, or glucose (sugar).  Pregnant women should follow their Care Team instructions. Water with medications is okay. Do not drink coffee or other fluids.   If you have concerns about taking  your medications, please ask at office or if scheduling via PeopleGoal, send a message by clicking on Secure Messaging, Message Your Care Team.             May 10, 2017 10:40 AM CDT   Return Visit with Nico Byers MD   [facility] ([facility])    [address] [place] [address]  [place] [id number] [phone]            Sep 25, 2017  1:00 PM CDT   Return Visit with Arlet Gonzalez MD   [facility] ([facility])    [address] [place] [address] [phone]              Who to contact     If you have questions or need follow up information about today's clinic visit or your schedule please contact [facility] directly at [phone].  Normal or non-critical lab and imaging results will be communicated to you by [email or web address], letter or phone within 4 business days after the clinic has received the results. If you do not hear from us within 7 days, please contact the clinic through PayStandt or phone. If you have a critical or abnormal lab result, we will notify you by phone as soon as possible.  Submit refill requests through Rico or call your pharmacy and they will forward the refill request to us. Please allow 3 business days for your refill to be completed.          Additional Information About Your Visit        Rico Information     Rico gives you secure access to your electronic health record. If you see a primary care provider, you can also send messages to your care team and make appointments. If you have questions, please call your primary care clinic.  If you do not have a primary care provider, please call [phone] and they will assist you.        Care EveryWhere ID     This is your Care EveryWhere ID. This could be used by other organizations to access your [place] medical records  [id number]        Your Vitals Were     Pulse Temperature Pulse Oximetry BMI (Body Mass Index)          90 97  F (36.1  C) (Oral) 97% 32.96 kg/m2         Blood Pressure from Last 3 Encounters:   02/23/17 120/62   02/10/17 112/68   02/10/17 120/60    Weight from Last  3 Encounters:   02/23/17 199 lb 9.6 oz (90.5 kg)   02/10/17 202 lb 9.6 oz (91.9 kg)   02/10/17 201 lb 12.8 oz (91.5 kg)              Today, you had the following     No orders found for display         Today's Medication Changes          These changes are accurate as of: 2/23/17  1:08 PM.  If you have any questions, ask your nurse or doctor.               Start taking these medicines.        Dose/Directions    fexofenadine 180 MG tablet   Commonly known as:  ALLEGRA   Used for:  Sinus congestion   Started by:  Martina Hong MD        Dose:  180 mg   Take 1 tablet (180 mg) by mouth daily   Quantity:  30 tablet   Refills:  1         These medicines have changed or have updated prescriptions.        Dose/Directions    ferrous gluconate 324 (38 FE) MG tablet   Commonly known as:  FERGON   This may have changed:  when to take this   Used for:  Low iron        Dose:  324 mg   Take 1 tablet (324 mg) by mouth 3 times daily (with meals)   Quantity:  270 tablet   Refills:  1            Where to get your medicines      These medications were sent to [facility] [place] [facility] [address]     Phone:  [phone]     fexofenadine 180 MG tablet                Primary Care Provider Office Phone # Fax #    Georgia Vázquez [phone]       [address] [address]        Thank you!     Thank you for choosing [facility]  for your care. Our goal is always to provide you with excellent care. Hearing back from our patients is one way we can continue to improve our services. Please take a few minutes to complete the written survey that you may receive in the mail after your visit with us. Thank you!             Your Updated Medication List - Protect others around you: Learn how to safely use, store and throw away your medicines at www.disposemymeds.org.          This list  is accurate as of: 2/23/17  1:08 PM.  Always use your most recent med list.                   Brand Name Dispense Instructions for use    ascorbic acid 1000 MG Tabs tablet      Take 1 tablet by mouth 3 times daily       aspirin 81 MG tablet      Take 1 tablet by mouth daily.       atorvastatin 40 MG tablet    LIPITOR    90 tablet    TAKE ONE TABLET BY MOUTH EVERY DAY       CALCIUM CITRATE + PO      Take 2 tablets by mouth daily       cetirizine 10 MG tablet    zyrTEC    90 tablet    Take 1 tablet by mouth daily.       cyanocobalamin 500 MCG Tabs      Take 1 tablet by mouth 2 times daily       ferrous gluconate 324 (38 FE) MG tablet    FERGON    270 tablet    Take 1 tablet (324 mg) by mouth 3 times daily (with meals)       fexofenadine 180 MG tablet    ALLEGRA    30 tablet    Take 1 tablet (180 mg) by mouth daily       fluticasone 50 MCG/ACT spray    FLONASE    16 g    Spray 2 sprays into both nostrils daily       folic acid 1 MG tablet    FOLVITE    100 tablet    Take 1 tablet (1 mg) by mouth daily       magnesium oxide 400 (241.3 MG) MG tablet    MAG-OX     Take 1 tablet (400 mg) by mouth daily       methotrexate 2.5 MG tablet     120 tablet    Take 10 tablets (25 mg) by mouth once a week       Multi-vitamin Tabs tablet      Take 1 tablet by mouth daily       NEW MED      Reported on 2/23/2017       OMEGA-3 FISH OIL PO      Take 2,000 mg by mouth daily       [place] [facility]      Respironics REMSTAR 60 Series Auto CPAP 12-15 cm H2O, Wisp nasal mask w/a s/m cushion       pantoprazole 40 MG EC tablet    PROTONIX    90 tablet    TAKE ONE TABLET BY MOUTH ONCE DAILY 30-60 MINUTES BEFORE A MEAL       propylene glycol 0.6 % Soln ophthalmic solution    SYSTANE BALANCE     Place 1 drop into both eyes 4 times daily       sodium chloride 5 % ophthalmic ointment    BIRDIE 128     Apply small amount to both eyelids at bedtime       tofacitinib 11 MG 24 hr tablet    XELJANZ XR    90 tablet    Take 1 tablet (11 mg) by mouth daily        topiramate 25 MG tablet    TOPAMAX    270 tablet    Take 1 in the morning and 2 at bedtime.       TUMS PO      Take by mouth as needed Reported on 2/23/2017       TYLENOL 500 MG tablet   Generic drug:  acetaminophen     100 tablet    Take 2 tablets (1,000 mg) by mouth every 8 hours as needed for pain       vitamin B complex with vitamin C Tabs tablet      Take 1 tablet by mouth daily.       VITAMIN D-3 PO      Take 1,000 Units by mouth 2 times daily       VITAMIN E PO      Take 2,000 Units by mouth       ZOLOFT 25 MG tablet   Generic drug:  sertraline      Take 25 mg by mouth

## 2017-02-23 NOTE — NURSING NOTE
"Ginger Marshall is a 76 year old female who presents for:  Chief Complaint   Patient presents with     Neurologic Problem     referral from Dr. Byers for low back pain radiating into the right leg with sharp burning pain, she has had surgery before but does not remember when        Initial Vitals:  There were no vitals taken for this visit. Estimated body mass index is 32.96 kg/(m^2) as calculated from the following:    Height as of 1/27/17: 5' 5.25\" (1.657 m).    Weight as of an earlier encounter on 2/23/17: 199 lb 9.6 oz (90.5 kg).. There is no height or weight on file to calculate BSA. BP completed using cuff size: large  Data Unavailable    Do you feel safe in your environment?  Yes  Do you need any refills today? No    Nursing Comments: referral from Dr. Byers for low back pain radiating into the right leg with sharp burning pain, she has had surgery before but does not remember when.  Patient rates her pain today as 6-10 with movement      5 min. nursing intake time  Sujatha Campbell CMA, AAS      Discharge plan:   Schedule lumbar MRI  We will call you with results.  Please contact the clinic if pain persists at [phone].      2 min. nursing discharge time  Sujatha Campbell CMA, AAS       "

## 2017-02-23 NOTE — MR AVS SNAPSHOT
After Visit Summary   2/23/2017    Ginger Marshall    MRN: [id number]           Patient Information     Date Of Birth          1940        Visit Information        Provider Department      2/23/2017 2:00 PM Carmen Fierro NP [facility]        Today's Diagnoses     Lumbar radiculopathy    -  1      Care Instructions    Schedule lumbar MRI  We will call you with results.  Please contact the clinic if pain persists at [phone].          Follow-ups after your visit        Your next 10 appointments already scheduled     Mar 01, 2017 11:30 AM CST   Office Visit with Georgia Vázquez MD   [facility] ([facility])    [address] [place] [id number] [phone]           Bring a current list of meds and any records pertaining to this visit.  For Physicals, please bring immunization records and any forms needing to be filled out.  Please arrive 10 minutes early to complete paperwork.            May 05, 2017 11:00 AM CDT   LAB with  LAB   [facility] ([facility])    [address] [place] [id number] [phone]           Patient must bring picture ID.  Patient should be prepared to give a urine specimen  Please do not eat 10-12 hours before your appointment if you are coming in fasting for labs on lipids, cholesterol, or glucose (sugar).  Pregnant women should follow their Care Team instructions. Water with medications is okay. Do not drink coffee or other fluids.   If you have concerns about taking  your medications, please ask at office or if scheduling via [facility], send a message by clicking on Secure Messaging, Message Your Care Team.            May 10, 2017 10:40 AM CDT   [facility]ico [facility]ers [facility]   [facility])    [address] [place] [id number] [phone]            Sep 25, 2017  1:00 PM CDT   Return Visit with Arlet  MD Lisa   [facility] ([facility])    [address] [address] [place] [address] [phone]              Future tests that were ordered for you today     Open Future Orders        Priority Expected Expires Ordered    MR Lumbar Spine w/o Contrast Routine  2/23/2018 2/23/2017            Who to contact     If you have questions or need follow up information about today's clinic visit or your schedule please contact [facility] directly at [phone].  Normal or non-critical lab and imaging results will be communicated to you by Udexhart, letter or phone within 4 business days after the clinic has received the results. If you do not hear from us within 7 days, please contact the clinic through orderTalkt or phone. If you have a critical or abnormal lab result, we will notify you by phone as soon as possible.  Submit refill requests through Arts & Analytics or call your pharmacy and they will forward the refill request to us. Please allow 3 business days for your refill to be completed.          Additional Information About Your Visit        Udexhart Information     Arts & Analytics gives you secure access to your electronic health record. If you see a primary care provider, you can also send messages to your care team and make appointments. If you have questions, please call your primary care clinic.  If you do not have a primary care provider, please call [phone] and they will assist you.        Care EveryWhere ID     This is your Care EveryWhere ID. This could be used by other organizations to access your [place] medical records  [id number]        Your Vitals Were     Pulse Temperature Pulse Oximetry Breastfeeding? BMI (Body Mass Index)       73 96.9  F (36.1  C) (Oral) 96% No 32.86 kg/m2        Blood Pressure from Last 3 Encounters:   02/23/17 117/74   02/23/17 120/62   02/10/17 112/68    Weight from Last 3 Encounters:   02/23/17 199 lb (90.3 kg)   02/23/17 199  lb 9.6 oz (90.5 kg)   02/10/17 202 lb 9.6 oz (91.9 kg)                 Today's Medication Changes          These changes are accurate as of: 2/23/17  2:27 PM.  If you have any questions, ask your nurse or doctor.               Start taking these medicines.        Dose/Directions    fexofenadine 180 MG tablet   Commonly known as:  ALLEGRA   Used for:  Sinus congestion   Started by:  Martina Hong MD        Dose:  180 mg   Take 1 tablet (180 mg) by mouth daily   Quantity:  30 tablet   Refills:  1         These medicines have changed or have updated prescriptions.        Dose/Directions    ferrous gluconate 324 (38 FE) MG tablet   Commonly known as:  FERGON   This may have changed:  when to take this   Used for:  Low iron        Dose:  324 mg   Take 1 tablet (324 mg) by mouth 3 times daily (with meals)   Quantity:  270 tablet   Refills:  1            Where to get your medicines      These medications were sent to [facility] [place], [place] - [address] [place] [address]  [address] [place] [address]     Phone:  [phone]     fexofenadine 180 MG tablet                Primary Care Provider Office Phone # Fax #    Georgia Vázquez [phone]       [address] [address]        Thank you!     Thank you for choosing [facility]  for your care. Our goal is always to provide you with excellent care. Hearing back from our patients is one way we can continue to improve our services. Please take a few minutes to complete the written survey that you may receive in the mail after your visit with us. Thank you!             Your Updated Medication List - Protect others around you: Learn how to safely use, store and throw away your medicines at www.disposemymeds.org.          This list is accurate as of: 2/23/17  2:27 PM.  Always use your most recent med list.                   Brand Name Dispense Instructions for  use    ascorbic acid 1000 MG Tabs tablet      Take 1 tablet by mouth 3 times daily       aspirin 81 MG tablet      Take 1 tablet by mouth daily.       atorvastatin 40 MG tablet    LIPITOR    90 tablet    TAKE ONE TABLET BY MOUTH EVERY DAY       CALCIUM CITRATE + PO      Take 2 tablets by mouth daily       cetirizine 10 MG tablet    zyrTEC    90 tablet    Take 1 tablet by mouth daily.       cyanocobalamin 500 MCG Tabs      Take 1 tablet by mouth 2 times daily       ferrous gluconate 324 (38 FE) MG tablet    FERGON    270 tablet    Take 1 tablet (324 mg) by mouth 3 times daily (with meals)       fexofenadine 180 MG tablet    ALLEGRA    30 tablet    Take 1 tablet (180 mg) by mouth daily       fluticasone 50 MCG/ACT spray    FLONASE    16 g    Spray 2 sprays into both nostrils daily       folic acid 1 MG tablet    FOLVITE    100 tablet    Take 1 tablet (1 mg) by mouth daily       magnesium oxide 400 (241.3 MG) MG tablet    MAG-OX     Take 1 tablet (400 mg) by mouth daily       methotrexate 2.5 MG tablet     120 tablet    Take 10 tablets (25 mg) by mouth once a week       Multi-vitamin Tabs tablet      Take 1 tablet by mouth daily       NEW MED      Reported on 2/23/2017       OMEGA-3 FISH OIL PO      Take 2,000 mg by mouth daily       order for DME      Respironics REMSTAR 60 Series Auto CPAP 12-15 cm H2O, Wisp nasal mask w/a s/m cushion       pantoprazole 40 MG EC tablet    PROTONIX    90 tablet    TAKE ONE TABLET BY MOUTH ONCE DAILY 30-60 MINUTES BEFORE A MEAL       propylene glycol 0.6 % Soln ophthalmic solution    SYSTANE BALANCE     Place 1 drop into both eyes 4 times daily       sodium chloride 5 % ophthalmic ointment    BIRDIE 128     Apply small amount to both eyelids at bedtime       tofacitinib 11 MG 24 hr tablet    XELJANZ XR    90 tablet    Take 1 tablet (11 mg) by mouth daily       topiramate 25 MG tablet    TOPAMAX    270 tablet    Take 1 in the morning and 2 at bedtime.       TUMS PO      Take by mouth as  needed Reported on 2/23/2017       TYLENOL 500 MG tablet   Generic drug:  acetaminophen     100 tablet    Take 2 tablets (1,000 mg) by mouth every 8 hours as needed for pain       vitamin B complex with vitamin C Tabs tablet      Take 1 tablet by mouth daily.       VITAMIN D-3 PO      Take 1,000 Units by mouth 2 times daily       VITAMIN E PO      Take 2,000 Units by mouth       ZOLOFT 25 MG tablet   Generic drug:  sertraline      Take 25 mg by mouth

## 2017-02-23 NOTE — PATIENT INSTRUCTIONS
Please stop Cetirizine and take Allegra instead.  Please call the clinic if you are not better in 10-14 days-we may try a 5 day course of 20mg of daily prednisone at that point.

## 2017-02-23 NOTE — PROGRESS NOTES
"Dr. Anthony Michele  [facility]  Neurosurgery Clinic Visit        CC: Low back pain    Primary care Provider: Georgia Vázquez      Reason For Visit:   I was asked by Dr. Byers to consult on the patient for low back and right leg pain.      HPI: Ginger Marshall is a 76 year old female with a long standing history of bilateral low back pain.  She has had previous lumbar surgery; L4-5 right hemilaminectomy.  She describes her pain however, as becoming increasingly more bothersome. She states it is a constant aching pain that intensifies to a burn.  She states the pain radiates into the right buttock as well as the entire left thigh, \"My groin is the worst.\"  She has had left total hip replacement and has been evaluated by her orthopedist whom states says her hip is \"fine.\"    Her pain is aggravated with \"movement.\"  She specifically has increased pain with standing and walking >1 block.  She has positive grocery cart sign.   Her pain is relieved with sitting.  She denies weakness to lower extremities.  She denies changes in bowel and bladder.  She [facility].  She now goes there prn for injections.  She states the last injection (bilateral SI joint injections) were not helpful.    Pain at its worst 10  Pain right now:  6    Past Medical History   Diagnosis Date     Acute posthemorrhagic anemia 10/13/2012     Ex-smoker 01/99     History of blood transfusion      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     History of total knee replacement 7/23/2009     Menopause late 40's     Pelvic fracture (H) 5/13/2014     PUD (peptic ulcer disease)      Vitamin B12 deficiency        Past Medical History reviewed with patient during visit.    Past Surgical History   Procedure Laterality Date     C/section, low transverse  66,72     x 2     C nonspecific procedure  91     left foot surgery     C nonspecific procedure  95     R MCP surgery     " Breast biopsy, rt/lt       left benign     Arthroscopy knee rt/lt  01/06     left     C total knee arthroplasty  2006     left     C hand/finger surgery unlisted  11/05     right hand     Colonoscopy  2006,2009     C anesth,total hip arthroplasty       Rt hip     Hc esoph/gas reflux test w nasal imped >1 hr  2/1/2012     Procedure:ESOPHAGEAL IMPEDENCE FUNCTION TEST WITH 24 HOUR PH GREATER THAN 1 HOUR; Surgeon:KAYKAY JULIO; Location:[facility]     Ir caudal epidural injection single  5/2/2012     LESI L5-S1 at [facility]     Surgical history of -   2007     right knee total replacement     Abdomen surgery       c-sections     Breast surgery  90's     lumpectomy     Eye surgery       cataracs     Back surgery  2013     disc     Cataract iol, rt/lt       Past Surgical History reviewed with patient during visit.    Current Outpatient Prescriptions   Medication     fexofenadine (ALLEGRA) 180 MG tablet     sertraline (ZOLOFT) 25 MG tablet     methotrexate 2.5 MG tablet     atorvastatin (LIPITOR) 40 MG tablet     pantoprazole (PROTONIX) 40 MG enteric coated tablet     order for DME     VITAMIN E PO     fluticasone (FLONASE) 50 MCG/ACT nasal spray     Omega-3 Fatty Acids (OMEGA-3 FISH OIL PO)     folic acid (FOLVITE) 1 MG tablet     multivitamin, therapeutic with minerals (MULTI-VITAMIN) TABS     magnesium oxide (MAG-OX) 400 (241.3 MG) MG tablet     propylene glycol (SYSTANE BALANCE) 0.6 % SOLN ophthalmic solution     sodium chloride (BIRDIE 128) 5 % ophthalmic ointment     NEW MED     topiramate (TOPAMAX) 25 MG tablet     tofacitinib (XELJANZ XR) 11 MG XR tablet     ferrous gluconate (FERGON) 324 (38 FE) MG tablet     cyanocobalamin 500 MCG TABS     Calcium Citrate-Vitamin D (CALCIUM CITRATE + PO)     Cholecalciferol (VITAMIN D-3 PO)     Ascorbic Acid 1000 MG TABS     acetaminophen (TYLENOL) 500 MG tablet     vitamin  B complex with vitamin C (VITAMIN  B COMPLEX) TABS     Calcium Carbonate Antacid (TUMS PO)     cetirizine  (ZYRTEC) 10 MG tablet     aspirin 81 MG tablet     [DISCONTINUED] methotrexate 2.5 MG tablet     No current facility-administered medications for this visit.        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     Drug use: No     Sexual activity: No     Other Topics Concern     Parent/Sibling W/ Cabg, Mi Or Angioplasty Before 65f 55m? No     Caffeine Concern Yes     She has 8 cups of coffee in the AM and is done by 8-8:30 AM.     Exercise Yes     Sometimes.  [employer] comes 3 times a week to her building.     Social History Narrative    She lives in a a senior living apartment building.       Family History   Problem Relation Age of Onset     Arthritis Mother      Alzheimer Disease Mother      Hyperlipidemia Mother      OSTEOPOROSIS Mother      HEART DISEASE Father      MI ( from this)     Alcohol/Drug Father      Arthritis Sister      Hypertension Sister      CANCER Son      DIABETES Son      Neurologic Disorder Sister      Schizophrenic     Hypertension Sister      Hyperlipidemia Sister      MENTAL ILLNESS Sister      DIABETES Son      Other Cancer Son          ROS: 10 point ROS neg other than the symptoms noted above in the HPI.    Vital Signs: /74  Pulse 73  Temp 96.9  F (36.1  C) (Oral)  Wt 199 lb (90.3 kg)   SpO2 96%  Breastfeeding? No  BMI 32.86 kg/m2    Examination:  Constitutional:  Alert, well nourished, NAD.  HEENT: Normocephalic, atraumatic.   Pulm:  Without shortness of breath   CV:  No pitting edema of BLE.    Neurological:  Awake  Alert  Oriented x 3  Speech clear  Cranial nerves II - XII intact    Motor exam   Shoulder Abduction:  Right:  5/5   Left:  5/5  Biceps:                      Right:  5/5   Left:  5/5  Triceps:                     Right:  5/5   Left:  5/5  Wrist Extensors:       Right:  5/5   Left:  5/5  Wrist Flexors:           Right:  5/5   Left:  5/5  Intrinsics:                   Right:  5/5   Left:  5/5   Hip Flexor:                Right: 5/5  Left:  5/5  Hip Adductor:             Right:  5/5  Left:  5/5  Hip Abductor:             Right:  5/5  Left:  5/5  Gastroc Soleus:        Right:  5/5  Left:  5/5  Tib/Ant:                      Right:  5/5  Left:  5/5  EHL:                          Right:  5/5  Left:  5/5   Sensation normal to bilateral upper and lower extremities  Clonus negative  DTRs 1+ symmetrically  Gait: Able to stand from a seated position. Normal non-antalgic, non-myelopathic gait.    Cervical examination reveals good range of motion.  No tenderness to palpation of the cervical spine or paraspinous muscles bilaterally.    Lumbar examination reveals no tenderness of the spine or paraspinous muscles.  Hip height is symmetrical. Negative SI joint, sciatic notch or greater trochanteric tenderness to palpation bilaterally.  Straight leg raise is negative bilaterally.      Imaging: Lumbar MRI 2/22/16:  Multilevel stenosis with moderate central canal stenosis at  L3-L4 with moderate foraminal narrowing on the left at L4-L5 and onthe right at L5-S1.    Assessment/Plan:   Lumbar DDD with stenosis    Ginger Marshall is a 76 year old female with a long standing history of bilateral low back pain.  She states it is a constant aching pain that intensifies to a burn.  She states the pain radiates into  "the right buttock as well as the entire left thigh, \"My groin is the worst.\"  She has had left total hip replacement and has been evaluated by her orthopedist whom states says her hip is \"fine.\"    Her pain is aggravated with \"movement.\"  She specifically has increased pain with standing and walking >1 block.  She has had previous lumbar surgery, right L4-5 hemilaminectomy, but states the pain has progressively worsened the past several month. She has had several injections over the years and is [facility].  We discussed getting an updated lumbar MRI and would discuss recommendations based on results.    Patient Instructions   Schedule lumbar MRI  We will call you with results.  Please contact the clinic if pain persists at [phone].                    Carmen Jiménez  [facility]  [address] [address]    Tel [phone]  Pager [phone]  "

## 2017-02-23 NOTE — NURSING NOTE
"Chief Complaint   Patient presents with     URI       Initial /62 (BP Location: Left arm, Patient Position: Chair, Cuff Size: Adult Regular)  Pulse 90  Temp 97  F (36.1  C) (Oral)  Wt 199 lb 9.6 oz (90.5 kg)  SpO2 97%  BMI 32.96 kg/m2 Estimated body mass index is 32.96 kg/(m^2) as calculated from the following:    Height as of 1/27/17: 5' 5.25\" (1.657 m).    Weight as of this encounter: 199 lb 9.6 oz (90.5 kg).  Medication Reconciliation: complete    Gabi Louis CMA  "

## 2017-02-24 ASSESSMENT — SLIT LAMP EXAM - LIDS: COMMENTS: 1+ MEIBOMIAN GLAND DYSFUNCTION, 1+ BLEPHARITIS

## 2017-02-24 ASSESSMENT — EXTERNAL EXAM - RIGHT EYE: OD_EXAM: NORMAL

## 2017-02-24 ASSESSMENT — EXTERNAL EXAM - LEFT EYE: OS_EXAM: NORMAL

## 2017-02-27 ENCOUNTER — RADIANT APPOINTMENT (OUTPATIENT)
Dept: MRI IMAGING | Facility: CLINIC | Age: 77
End: 2017-02-27
Attending: NURSE PRACTITIONER
Payer: COMMERCIAL

## 2017-02-27 DIAGNOSIS — M54.16 LUMBAR RADICULOPATHY: ICD-10-CM

## 2017-02-27 PROCEDURE — A9585 GADOBUTROL INJECTION: HCPCS | Mod: JW | Performed by: FAMILY MEDICINE

## 2017-02-27 PROCEDURE — 72158 MRI LUMBAR SPINE W/O & W/DYE: CPT | Mod: TC

## 2017-02-27 RX ORDER — GADOBUTROL 604.72 MG/ML
10 INJECTION INTRAVENOUS ONCE
Status: COMPLETED | OUTPATIENT
Start: 2017-02-27 | End: 2017-02-27

## 2017-02-27 RX ADMIN — GADOBUTROL 9 ML: 604.72 INJECTION INTRAVENOUS at 13:10

## 2017-03-01 ENCOUNTER — OFFICE VISIT (OUTPATIENT)
Dept: INTERNAL MEDICINE | Facility: CLINIC | Age: 77
End: 2017-03-01
Payer: COMMERCIAL

## 2017-03-01 VITALS
SYSTOLIC BLOOD PRESSURE: 122 MMHG | HEIGHT: 65 IN | WEIGHT: 195.5 LBS | TEMPERATURE: 97.2 F | DIASTOLIC BLOOD PRESSURE: 68 MMHG | BODY MASS INDEX: 32.57 KG/M2 | HEART RATE: 86 BPM | OXYGEN SATURATION: 93 %

## 2017-03-01 DIAGNOSIS — M48.061 SPINAL STENOSIS OF LUMBAR REGION WITHOUT NEUROGENIC CLAUDICATION: ICD-10-CM

## 2017-03-01 DIAGNOSIS — T78.40XD ALLERGIC STATE, SUBSEQUENT ENCOUNTER: ICD-10-CM

## 2017-03-01 DIAGNOSIS — M51.26 HERNIATED NUCLEUS PULPOSUS, L3-4: ICD-10-CM

## 2017-03-01 DIAGNOSIS — M51.369 LUMBAR DEGENERATIVE DISC DISEASE: Primary | ICD-10-CM

## 2017-03-01 PROCEDURE — 99214 OFFICE O/P EST MOD 30 MIN: CPT | Performed by: INTERNAL MEDICINE

## 2017-03-01 NOTE — MR AVS SNAPSHOT
After Visit Summary   3/1/2017    Ginger Marshall    MRN: [id number]           Patient Information     Date Of Birth          1940        Visit Information        Provider Department      3/1/2017 11:30 AM Georgia Vázquez MD [facility]        Today's Diagnoses     BMI 32.0-32.9,adult    -  1    Allergic state, subsequent encounter        Spinal stenosis of lumbar region without neurogenic claudication        Herniated nucleus pulposus, L3-4          Care Instructions    Continue on Allegra and start using Flonase- two puffs at a time.    [facility]    If you have any questions regarding to your visit please contact your care team:     Team Pink:   Clinic Hours Telephone Number   Internal Medicine:  Dr. Georgia Flores NP       7am-7pm  Monday - Thursday   7am-5pm  Fridays  [phone]  (Appointment scheduling available 24/7)    Questions about your visit?  Team Line  [phone]   Urgent Care - [place] and [place] - 11am-9pm Monday-Friday Saturday-Sunday- 9am-5pm   [place] - 5pm-9pm Monday-Friday Saturday-Sunday- 9am-5pm  [phone] - [place]   [phone] - [place]       What options do I have for visits at the clinic other than the traditional office visit?  To expand how we care for you, many of our providers are utilizing electronic visits (e-visits) and telephone visits, when medically appropriate, for interactions with their patients rather than a visit in the clinic.   We also offer nurse visits for many medical concerns. Just like any other service, we will bill your insurance company for this type of visit based on time spent on the phone with your provider. Not all insurance companies cover these visits. Please check with your medical insurance if this type of visit is covered. You will be responsible for any charges that are not paid by your insurance.      E-visits via [employer]:  generally  incur a $35.00 fee.  Telephone visits:  Time spent on the phone: *charged based on time that is spent on the phone in increments of 10 minutes. Estimated cost:   5-10 mins $30.00   11-20 mins. $59.00   21-30 mins. $85.00   Use [employer] (secure email communication and access to your chart) to send your primary care provider a message or make an appointment. Ask someone on your Team how to sign up for [employer] log[employer].    For a Price Quote for your services, please call our Consumer Price Line at [phone].    As always, Thank you for trusting us with your health care needs!    Melinda SHULTZ CMA ([facility])          Follow-ups after your visit        Your next 10 appointments already scheduled     May 05, 2017 11:00 AM CDT   LAB with [facility]   [facility] ([address] [address]   [phone]           Patient must bring picture ID.  Patient should be prepared to give a urine specimen  Please do not eat 10-12 hours before your appointment if you are coming in fasting for labs on lipids, cholesterol, or glucose (sugar).  Pregnant women should follow their Care Team instructions. Water with medications is okay. Do not drink coffee or other fluids.   If you have concerns about taking  your medications, please ask at office or if scheduling via [employer], send a message by clicking on Secure Messaging, Message Your Care Team.            May 10, 2017 10:40 AM CDT   Return Visit with Nico Byers MD   [facility] ([address] [address]   [phone]            Sep 25, 2017  1:00 PM CDT   [facility] with Arlet Gonzalez MD   [facility]    [address] [place] [id number] [phone]              Who to contact     If you have questions or need follow up information about today's clinic visit or your schedule please contact [facility]  "[place] directly at [phone].  Normal or non-critical lab and imaging results will be communicated to you by ClrTouchhart, letter or phone within 4 business days after the clinic has received the results. If you do not hear from us within 7 days, please contact the clinic through [employer] or phone. If you have a critical or abnormal lab result, we will notify you by phone as soon as possible.  Submit refill requests through [employer] or call your pharmacy and they will forward the refill request to us. Please allow 3 business days for your refill to be completed.          Additional Information About Your Visit        ClrTouchharBioMax Information     [employer] gives you secure access to your electronic health record. If you see a primary care provider, you can also send messages to your care team and make appointments. If you have questions, please call your primary care clinic.  If you do not have a primary care provider, please call [phone] and they will assist you.        Care EveryWhere ID     This is your Care EveryWhere ID. This could be used by other organizations to access your [place] medical records  [id number]        Your Vitals Were     Pulse Temperature Height Pulse Oximetry BMI (Body Mass Index)       86 97.2  F (36.2  C) (Oral) 5' 5.25\" (1.657 m) 93% 32.28 kg/m2        Blood Pressure from Last 3 Encounters:   03/01/17 122/68   02/23/17 117/74   02/23/17 120/62    Weight from Last 3 Encounters:   03/01/17 195 lb 8 oz (88.7 kg)   02/23/17 199 lb (90.3 kg)   02/23/17 199 lb 9.6 oz (90.5 kg)              Today, you had the following     No orders found for display         Today's Medication Changes          These changes are accurate as of: 3/1/17 12:05 PM.  If you have any questions, ask your nurse or doctor.               These medicines have changed or have updated prescriptions.        Dose/Directions    ferrous gluconate 324 (38 FE) MG tablet   Commonly known as:  FERGON   This may have changed:  when to " take this   Used for:  Low iron        Dose:  324 mg   Take 1 tablet (324 mg) by mouth 3 times daily (with meals)   Quantity:  270 tablet   Refills:  1                Primary Care Provider Office Phone # Fax #    Georgia Vázquez [phone]       [address]  [place] [address]        Thank you!     Thank you for choosing [facility]  for your care. Our goal is always to provide you with excellent care. Hearing back from our patients is one way we can continue to improve our services. Please take a few minutes to complete the written survey that you may receive in the mail after your visit with us. Thank you!             Your Updated Medication List - Protect others around you: Learn how to safely use, store and throw away your medicines at www.disposemymeds.org.          This list is accurate as of: 3/1/17 12:05 PM.  Always use your most recent med list.                   Brand Name Dispense Instructions for use    ascorbic acid 1000 MG Tabs tablet      Take 1 tablet by mouth 3 times daily       aspirin 81 MG tablet      Take 1 tablet by mouth daily.       atorvastatin 40 MG tablet    LIPITOR    90 tablet    TAKE ONE TABLET BY MOUTH EVERY DAY       CALCIUM CITRATE + PO      Take 2 tablets by mouth daily       cetirizine 10 MG tablet    zyrTEC    90 tablet    Take 1 tablet by mouth daily.       cyanocobalamin 500 MCG Tabs      Take 1 tablet by mouth 2 times daily       ferrous gluconate 324 (38 FE) MG tablet    FERGON    270 tablet    Take 1 tablet (324 mg) by mouth 3 times daily (with meals)       fexofenadine 180 MG tablet    ALLEGRA    30 tablet    Take 1 tablet (180 mg) by mouth daily       fluticasone 50 MCG/ACT spray    FLONASE    16 g    Spray 2 sprays into both nostrils daily       folic acid 1 MG tablet    FOLVITE    100 tablet    Take 1 tablet (1 mg) by mouth daily       magnesium oxide 400 (241.3 MG) MG tablet    MAG-OX     Take 1 tablet (400  mg) by mouth daily       methotrexate 2.5 MG tablet     120 tablet    Take 10 tablets (25 mg) by mouth once a week       Multi-vitamin Tabs tablet      Take 1 tablet by mouth daily       NEW MED      Reported on 2/23/2017       OMEGA-3 FISH OIL PO      Take 2,000 mg by mouth daily       order for DME      Respironics REMSTAR 60 Series Auto CPAP 12-15 cm H2O, Wisp nasal mask w/a s/m cushion       pantoprazole 40 MG EC tablet    PROTONIX    90 tablet    TAKE ONE TABLET BY MOUTH ONCE DAILY 30-60 MINUTES BEFORE A MEAL       propylene glycol 0.6 % Soln ophthalmic solution    SYSTANE BALANCE     Place 1 drop into both eyes 4 times daily       sodium chloride 5 % ophthalmic ointment    BIRDIE 128     Apply small amount to both eyelids at bedtime       tofacitinib 11 MG 24 hr tablet    XELJANZ XR    90 tablet    Take 1 tablet (11 mg) by mouth daily       topiramate 25 MG tablet    TOPAMAX    270 tablet    Take 1 in the morning and 2 at bedtime.       TUMS PO      Take by mouth as needed Reported on 2/23/2017       TYLENOL 500 MG tablet   Generic drug:  acetaminophen     100 tablet    Take 2 tablets (1,000 mg) by mouth every 8 hours as needed for pain       vitamin B complex with vitamin C Tabs tablet      Take 1 tablet by mouth daily.       VITAMIN D-3 PO      Take 1,000 Units by mouth 2 times daily       VITAMIN E PO      Take 2,000 Units by mouth       ZOLOFT 25 MG tablet   Generic drug:  sertraline      Take 25 mg by mouth

## 2017-03-01 NOTE — NURSING NOTE
"Chief Complaint   Patient presents with     Weight Check       Initial /68 (BP Location: Right arm, Patient Position: Chair, Cuff Size: Adult Regular)  Pulse 86  Temp 97.2  F (36.2  C) (Oral)  Ht 5' 5.25\" (1.657 m)  Wt 195 lb 8 oz (88.7 kg)  SpO2 93%  BMI 32.28 kg/m2 Estimated body mass index is 32.28 kg/(m^2) as calculated from the following:    Height as of this encounter: 5' 5.25\" (1.657 m).    Weight as of this encounter: 195 lb 8 oz (88.7 kg).  Medication Reconciliation: complete   Melinda SHULTZ CMA ([facility])      "

## 2017-03-01 NOTE — PATIENT INSTRUCTIONS
Continue on Allegra and start using Flonase- two puffs at a time.    [facility]    If you have any questions regarding to your visit please contact your care team:     Team Pink:   Clinic Hours Telephone Number   Internal Medicine:  Dr. Georgia Flores, NP       7am-7pm  Monday - Thursday   7am-5pm  Fridays  [phone]  (Appointment scheduling available 24/7)    Questions about your visit?  Team Line  [phone]   Urgent Care - [place] - 11am-9pm Monday-Friday Saturday-Sunday- 9am-5pm   [place] - 5pm-9pm Monday-Friday Saturday-Sunday- 9am-5pm  [phone] - [place]   [phone] - [place]       What options do I have for visits at the clinic other than the traditional office visit?  To expand how we care for you, many of our providers are utilizing electronic visits (e-visits) and telephone visits, when medically appropriate, for interactions with their patients rather than a visit in the clinic.   We also offer nurse visits for many medical concerns. Just like any other service, we will bill your insurance company for this type of visit based on time spent on the phone with your provider. Not all insurance companies cover these visits. Please check with your medical insurance if this type of visit is covered. You will be responsible for any charges that are not paid by your insurance.      E-visits via [employer]:  generally incur a $35.00 fee.  Telephone visits:  Time spent on the phone: *charged based on time that is spent on the phone in increments of 10 minutes. Estimated cost:   5-10 mins $30.00   11-20 mins. $59.00   21-30 mins. $85.00   Use [employer] (secure email communication and access to your chart) to send your primary care provider a message or make an appointment. Ask someone on your Team how to sign up for [employer].    For a Price Quote for your services, please call our Consumer Price Line at [phone].    As  always, Thank you for trusting us with your health care needs!    Melinda SHULTZ CMA ([facility])

## 2017-03-07 ENCOUNTER — RADIANT APPOINTMENT (OUTPATIENT)
Dept: GENERAL RADIOLOGY | Facility: CLINIC | Age: 77
End: 2017-03-07
Attending: NURSE PRACTITIONER
Payer: COMMERCIAL

## 2017-03-07 ENCOUNTER — OFFICE VISIT (OUTPATIENT)
Dept: NEUROSURGERY | Facility: CLINIC | Age: 77
End: 2017-03-07
Payer: COMMERCIAL

## 2017-03-07 VITALS
SYSTOLIC BLOOD PRESSURE: 112 MMHG | BODY MASS INDEX: 32.49 KG/M2 | DIASTOLIC BLOOD PRESSURE: 58 MMHG | HEIGHT: 65 IN | WEIGHT: 195 LBS | HEART RATE: 70 BPM

## 2017-03-07 DIAGNOSIS — M53.2X6 LUMBAR SPINE INSTABILITY: ICD-10-CM

## 2017-03-07 DIAGNOSIS — M54.16 LUMBAR RADICULOPATHY: ICD-10-CM

## 2017-03-07 DIAGNOSIS — M51.369 LUMBAR DEGENERATIVE DISC DISEASE: ICD-10-CM

## 2017-03-07 DIAGNOSIS — M51.369 LUMBAR DEGENERATIVE DISC DISEASE: Primary | ICD-10-CM

## 2017-03-07 DIAGNOSIS — M43.10 ACQUIRED SPONDYLOLISTHESIS: ICD-10-CM

## 2017-03-07 PROCEDURE — 72100 X-RAY EXAM L-S SPINE 2/3 VWS: CPT

## 2017-03-07 PROCEDURE — 99213 OFFICE O/P EST LOW 20 MIN: CPT | Performed by: NEUROLOGICAL SURGERY

## 2017-03-07 ASSESSMENT — PAIN SCALES - GENERAL: PAINLEVEL: MODERATE PAIN (5)

## 2017-03-07 NOTE — PATIENT INSTRUCTIONS
I recommend L4-5 TILF surgery. You will be receiving a call from my RN, Belen MARTINES, to give you instructions and education for the surgery. You will also receive a call to schedule the surgery.  Please call the clinic with any questions. [phone]

## 2017-03-07 NOTE — MR AVS SNAPSHOT
After Visit Summary   3/7/2017    Ginger Marshall    MRN: [id number]           Patient Information     Date Of Birth          1940        Visit Information        Provider Department      3/7/2017 2:00 PM Anthony Michele MD [facility]        Care Instructions    I recommend L4-5 TILF surgery. You will be receiving a call from my RN, Belen MARTINES, to give you instructions and education for the surgery. You will also receive a call to schedule the surgery.  Please call the clinic with any questions. [phone]          Follow-ups after your visit        Your next 10 appointments already scheduled     Apr 05, 2017 11:00 AM CDT   Nurse Only with [facility] ANCILLARY   [facility] ([facility])    [address]  [place] [address]   [phone]            May 05, 2017 11:00 AM CDT   LAB with FZ LAB   [facility] ([facility])    [address]  [place] [address]   [phone]           Patient must bring picture ID.  Patient should be prepared to give a urine specimen  Please do not eat 10-12 hours before your appointment if you are coming in fasting for labs on lipids, cholesterol, or glucose (sugar).  Pregnant women should follow their Care Team instructions. Water with medications is okay. Do not drink coffee or other fluids.   If you have concerns about taking  your medications, please ask at office or if scheduling via [employer], send a message by clicking on Secure Messaging, Message Your Care Team.            May 10, 2017 10:15 AM CDT   Nurse Only with FZ ANCILLARY   [facility] [place] ([facility])    [address]  [place] [address]   [phone]            May 10, 2017 10:40 AM CDT   Return Visit with Nico Byers MD   [facility] ([facility])    [address]  [place] [address]   [phone]            Jun 07, 2017 11:00 AM CDT  "  Nurse Only with FZ ANCILLARY   [facility] ([facility])    [address]  [place] [id number] [phone]            Sep 25, 2017  1:00 PM CDT   Return Visit with Arlet Gonzalez MD   [facility] ([facility])    [address] [address] [place] [address] [phone]              Who to contact     If you have questions or need follow up information about today's clinic visit or your schedule please contact [facility] directly at [phone].  Normal or non-critical lab and imaging results will be communicated to you by MyChart, letter or phone within 4 business days after the clinic has received the results. If you do not hear from us within 7 days, please contact the clinic through [employer] or phone. If you have a critical or abnormal lab result, we will notify you by phone as soon as possible.  Submit refill requests through [employer] or call your pharmacy and they will forward the refill request to us. Please allow 3 business days for your refill to be completed.          Additional Information About Your Visit        MyChart Information     [employer] gives you secure access to your electronic health record. If you see a primary care provider, you can also send messages to your care team and make appointments. If you have questions, please call your primary care clinic.  If you do not have a primary care provider, please call [phone] and they will assist you.        Care EveryWhere ID     This is your Care EveryWhere ID. This could be used by other organizations to access your [facility] medical records  [id number]        Your Vitals Were     Pulse Height BMI (Body Mass Index)             70 5' 5\" (1.651 m) 32.45 kg/m2          Blood Pressure from Last 3 Encounters:   03/07/17 112/58   03/01/17 122/68   02/23/17 117/74    Weight from Last 3 Encounters:   03/07/17 195 lb (88.5 kg)   03/01/17 195 lb 8 oz (88.7 " kg)   02/23/17 199 lb (90.3 kg)              Today, you had the following     No orders found for display       Primary Care Provider Office Phone # Fax #    Georgia áVzquez [phone]       [address]  [place] [address]        Thank you!     Thank you for choosing [facility]  for your care. Our goal is always to provide you with excellent care. Hearing back from our patients is one way we can continue to improve our services. Please take a few minutes to complete the written survey that you may receive in the mail after your visit with us. Thank you!             Your Updated Medication List - Protect others around you: Learn how to safely use, store and throw away your medicines at www.disposemymeds.org.          This list is accurate as of: 3/7/17  2:35 PM.  Always use your most recent med list.                   Brand Name Dispense Instructions for use    ascorbic acid 1000 MG Tabs tablet      Take 1 tablet by mouth 3 times daily       aspirin 81 MG tablet      Take 1 tablet by mouth daily.       atorvastatin 40 MG tablet    LIPITOR    90 tablet    TAKE ONE TABLET BY MOUTH EVERY DAY       CALCIUM CITRATE + PO      Take 2 tablets by mouth daily       cetirizine 10 MG tablet    zyrTEC    90 tablet    Take 1 tablet by mouth daily.       cyanocobalamin 500 MCG Tabs      Take 1 tablet by mouth 2 times daily       ferrous gluconate 324 (38 FE) MG tablet    FERGON    270 tablet    Take 1 tablet (324 mg) by mouth 3 times daily (with meals)       fexofenadine 180 MG tablet    ALLEGRA    30 tablet    Take 1 tablet (180 mg) by mouth daily       fluticasone 50 MCG/ACT spray    FLONASE    16 g    Spray 2 sprays into both nostrils daily       folic acid 1 MG tablet    FOLVITE    100 tablet    Take 1 tablet (1 mg) by mouth daily       magnesium oxide 400 (241.3 MG) MG tablet    MAG-OX     Take 1 tablet (400 mg) by mouth daily       methotrexate 2.5 MG tablet      120 tablet    Take 10 tablets (25 mg) by mouth once a week       Multi-vitamin Tabs tablet      Take 1 tablet by mouth daily       OMEGA-3 FISH OIL PO      Take 2,000 mg by mouth daily       order for DME      Respironics REMSTAR 60 Series Auto CPAP 12-15 cm H2O, Wisp nasal mask w/a s/m cushion       pantoprazole 40 MG EC tablet    PROTONIX    90 tablet    TAKE ONE TABLET BY MOUTH ONCE DAILY 30-60 MINUTES BEFORE A MEAL       propylene glycol 0.6 % Soln ophthalmic solution    SYSTANE BALANCE     Place 1 drop into both eyes 4 times daily       sodium chloride 5 % ophthalmic ointment    BIRDIE 128     Apply small amount to both eyelids at bedtime       tofacitinib 11 MG 24 hr tablet    XELJANZ XR    90 tablet    Take 1 tablet (11 mg) by mouth daily       topiramate 25 MG tablet    TOPAMAX    270 tablet    Take 1 in the morning and 2 at bedtime.       TUMS PO      Take by mouth as needed Reported on 2/23/2017       TYLENOL 500 MG tablet   Generic drug:  acetaminophen     100 tablet    Take 2 tablets (1,000 mg) by mouth every 8 hours as needed for pain       vitamin B complex with vitamin C Tabs tablet      Take 1 tablet by mouth daily.       VITAMIN D-3 PO      Take 1,000 Units by mouth 2 times daily       VITAMIN E PO      Take 2,000 Units by mouth       ZOLOFT 25 MG tablet   Generic drug:  sertraline      Take 25 mg by mouth

## 2017-03-07 NOTE — NURSING NOTE
"Ginger Marshall is a 76 year old female who presents for:  Chief Complaint   Patient presents with     Neurologic Problem     low back pain with sciatica into right hip, previous spine surgery, MRI 2/27/16, SI injection 3/3/16, has pain in the thoracic region as well        Initial Vitals:  /58 (BP Location: Right arm, Patient Position: Chair, Cuff Size: Adult Regular)  Pulse 70  Ht 5' 5\" (1.651 m)  Wt 195 lb (88.5 kg)  BMI 32.45 kg/m2 Estimated body mass index is 32.45 kg/(m^2) as calculated from the following:    Height as of this encounter: 5' 5\" (1.651 m).    Weight as of this encounter: 195 lb (88.5 kg).. Body surface area is 2.01 meters squared. BP completed using cuff size: regular  Moderate Pain (5)    Do you feel safe in your environment?  Yes  Do you need any refills today? No    Nursing Comments: none      5 minutes nursing intake time  Candice Quinteros"

## 2017-03-08 NOTE — PROGRESS NOTES
Neurosurgery Follow Up Clinic Visit      Ginger Marshall returns for follow up visit regarding her LBP and RLE pain    Currently the patient describes having low back and RLE pain. She had a right L4 hemilaminectomy by Dr. Hughes on 2/19/13. She now difficulty with standing erect, walking and AODL. She now uses a walker for pain and assistance     Exam is stable  Uses walker    We reviewed the MRI lumbar -severe L3-4 stenosis and a grade 1 spondylolisthesis  Flex/Ext - unstable L3-4 spondylolisthesis       Plan:   I have recommended a L3-4 TLIF. I discussed with the patient the risk of surgery to include, but, not be limited to; nerve injury, pseudoarthrosis, failure of hardware, failure of improvement of symptoms, CSF leak,  infection, post op hematoma, the need for recurrent surgery, paralysis, coma and death

## 2017-03-10 ENCOUNTER — MYC MEDICAL ADVICE (OUTPATIENT)
Dept: INTERNAL MEDICINE | Facility: CLINIC | Age: 77
End: 2017-03-10

## 2017-03-10 NOTE — TELEPHONE ENCOUNTER
Patient called back and made appointment for preop.     Melinda SHULTZ CMA ([facility])

## 2017-03-10 NOTE — TELEPHONE ENCOUNTER
Called and left message to call pink team back to make an appointment.       Melinda SHULTZ CMA ([facility])

## 2017-03-14 ENCOUNTER — TELEPHONE (OUTPATIENT)
Dept: FAMILY MEDICINE | Facility: CLINIC | Age: 77
End: 2017-03-14

## 2017-03-14 NOTE — TELEPHONE ENCOUNTER
Reason for Call:  Other call back    Detailed comments:  [facility] would like to know if an Nasal screen for methicillin resistance staff can be completed at the time of the patients scheduled pre op dated for 03/27/17  please call to discuss further    Phone Number Patient can be reached at: Other phone number:    Best Time: today    Can we leave a detailed message on this number? YES    Call taken on 3/14/2017 at 1:18 PM by Rayna Briggs

## 2017-03-24 ENCOUNTER — TELEPHONE (OUTPATIENT)
Dept: NEUROSURGERY | Facility: CLINIC | Age: 77
End: 2017-03-24

## 2017-03-24 NOTE — PATIENT INSTRUCTIONS
Patient Instructions  Pre-Operative:  -Surgery scheduled at [facility] for L4-5 TLIF (transforaminal lumbar interbody fusion) on 4/3/17. Surgery time 9:15 am. Check in 7:15 am.   -Surgical risks: blood clots in the leg or lung, problems urinating, nerve damage, drainage from the incision, infection,stiffness  - Pre-operative physical with primary care physician within 30 days of surgical date.   -2-3 night hospitalization.    -Shower procedure: please shower with antimicrobial soap the night before surgery and morning of surgery. Please refer to showering instruction sheet in folder.  -Stop all solid foods 8 hours before surgery.  -Keep drinking clear liquids until 4 hours before surgery. Clear liquids include water, clear juice, black coffee, or clear tea without milk, Gatorade, clear soda.   - Discontinue Aspirin, NSAIDs (Advil/Ibuprofen, Naproxen,Nuprin,Relafen/Nabumetone, Diclofenac,Meloxicam, Aleve, Celebrex) x 7 days prior to surgical date.  - May try tylenol for pain 1000 mg three times per day for pain      Post-Operative:  -Do not begin taking Non-Steroidal Anti-Inflammatory Drugs or NSAIDs (Advil/ibuprofen, Naproxen,Nuprin, Relafen/Nabumetone, Diclofenac,Meloxicam, Aleve, Celebrex, Aspirin, etc.) until 12 weeks after surgery. May cause bleeding and interfere with bone healing.   - Post operative incisional pain x 1-2 weeks which will require pain medications and muscle relaxants. You will receive medication upon discharge.  -Do NOT drive while taking narcotic pain medication.  -Post operative incision care- Watch for signs of infection: redness, swelling, warmth, drainage, and fever of 101 degrees or higher. Notify clinic [phone].  -No submerging incision in water such as pools, hot tubs,baths for at least 8 weeks or until incision is healed. Showers are fine.   - Post operative activity limitations: 6-8 weeks, no lifting > 10 pounds, no bending, twisting, or overhead  reaching.  -Orthotics will fit you for a brace in the hospital.Lumbar Fusion: wear for 6-8 weeks   - Follow up appointments: 2 weeks suture/staple removal and 6 week post op follow up visit with Nurse practitioner and x-ray prior to appointment.Please call to schedule follow up appointment at [phone].  -If you are currently employed, you will need to be off work for 4-6 weeks for post op recovery and healing.   -Website for additional info on spinal fusion: [email or web address]  -Call RENATO Glover with any questions or concerns [phone]

## 2017-03-24 NOTE — PATIENT INSTRUCTIONS
Stop aspirin, ibuprofen, aleve, fish oil 7 days before surgery.   Stop methotrexate 2 weeks before surgery.  Stop xeljanz 1 week before surgery.  On the morning of surgery okay to take cetirizine, zoloft, protonix, topamax.    Before Your Surgery      Call your surgeon if there is any change in your health. This includes signs of a cold or flu (such as a sore throat, runny nose, cough, rash or fever).    Do not smoke, drink alcohol or take over the counter medicine (unless your surgeon or primary care doctor tells you to) for the 24 hours before and after surgery.    If you take prescribed drugs: Follow your doctor s orders about which medicines to take and which to stop until after surgery.    Eating and drinking prior to surgery: follow the instructions from your surgeon    Take a shower or bath the night before surgery. Use the soap your surgeon gave you to gently clean your skin. If you do not have soap from your surgeon, use your regular soap. Do not shave or scrub the surgery site.  Wear clean pajamas and have clean sheets on your bed.       [facility]    If you have any questions regarding to your visit please contact your care team:     Team Pink:   Clinic Hours Telephone Number   Internal Medicine:  Dr. Georgia Flores NP       7am-7pm  Monday - Thursday   7am-5pm  Fridays  [phone]  (Appointment scheduling available 24/7)    Questions about your visit?  Team Line  [phone]   Urgent Care - [place] - 11am-9pm Monday-Friday Saturday-Sunday- 9am-5pm   [place] - 5pm-9pm Monday-Friday Saturday-Sunday- 9am-5pm  [phone] - [place]   [phone] - [place]       What options do I have for visits at the clinic other than the traditional office visit?  To expand how we care for you, many of our providers are utilizing electronic visits (e-visits) and telephone visits, when medically appropriate, for interactions with  their patients rather than a visit in the clinic.   We also offer nurse visits for many medical concerns. Just like any other service, we will bill your insurance company for this type of visit based on time spent on the phone with your provider. Not all insurance companies cover these visits. Please check with your medical insurance if this type of visit is covered. You will be responsible for any charges that are not paid by your insurance.      E-visits via [employer]:  generally incur a $35.00 fee.  Telephone visits:  Time spent on the phone: *charged based on time that is spent on the phone in increments of 10 minutes. Estimated cost:   5-10 mins $30.00   11-20 mins. $59.00   21-30 mins. $85.00   Use [employer] (secure email communication and access to your chart) to send your primary care provider a message or make an appointment. Ask someone on your Team how to sign up for [employer].    For a Price Quote for your services, please call our Consumer Price Line at [phone].    As always, Thank you for trusting us with your health care needs!    Estephania Ibarra, CMA

## 2017-03-24 NOTE — TELEPHONE ENCOUNTER
Pre-operative Education    Education included but not limited to:  - Pre-operative physical with primary care physician within 30 days of surgical date. Pre op scheduled 3/27/17 at [facility].   - Pre-operative clearance (cardiology, hematology, etc).   - Discontinue Aspirin, NSAIDs, Diclofenac x 7 days prior to surgical date.   -May try tylenol for pain 1000 mg three times per day for pain  -Do not begin taking Non-Steroidal Anti-Inflammatory Drugs or NSAIDs (Advil,Motrin, Ibuprofen,Nuprin, Diclofenac,Meloxicam, Aleve, Celebrex, Aspirin, etc.) until 12 weeks after surgery if you had a fusion. May cause bleeding and interfere with bone healing.  -Patient is not a smoker  -Forms to be completed:none per patient. She is retired.  -Surgical risks: blood clots in the leg or lung, problems urinating, nerve damage, drainage from the incision, infection,stiffness  -Preparation timeline  - When to start NPO           -Special bathing procedure.Patient received Hibiclens and showering instruction sheet.   Hospital stay:  Checking in  The surgery itself   Recovery room  - Transition to the hospital room where you will begin your recovery  - Managing pain   - 2-3 night hospitalization.   - Post operative incisional pain x 1-2 weeks which will require pain medications and muscle relaxants.   -Do NOT drive while taking narcotic pain medication.  -Post operative incision care-Keep your incision clean and dry at all times. OK to remove dressing on postop day 2. OK to shower on postop day 3 and allow water to run over incision, pat dry after shower. No bathing, swimming or submerging in water. Call immediately or come to ED if any drainage occurs, or you develop new pain. Watch for signs of infection: redness, swelling, warmth, drainage, and fever of 101 degrees or higher. Notify clinic.   - Post operative activity limitations: 6-8 weeks, no lifting > 10 pounds, no bending, twisting, or overhead reaching.  -Orthotics  will fit you for a brace in the hospital.Lumbar Fusion: wear for 6-8 weeks   - Follow up appointments in 2 weeks for suture/staple removal and 6 weeks with Nurse Practitioner with X-ray prior. Please call to schedule follow up appointment at [phone].   - Spine Education book was also given to the patient for further review. Patient instruction sheet emailed to patient at [email or web address] on 3/30/17.     Patient verbalized understanding of above instructions. All questions were answered to the best of my ability and the patient's satisfaction. Patient advised to call with any additional questions or concerns.  Belen Healy RN

## 2017-03-24 NOTE — PROGRESS NOTES
[facility]  [address]  [place] [address]  [phone]  Dept: [phone]    PRE-OP EVALUATION:  Today's date: 3/27/2017    Ginger Marshall (: 1940) presents for pre-operative evaluation assessment as requested by Dr. Michele.  She requires evaluation and anesthesia risk assessment prior to undergoing surgery/procedure for treatment of back pain .  Proposed procedure: L4-L5 TILF    Date of Surgery/ Procedure: 4/3/17  Time of Surgery/ Procedure: 9:30am  Hospital/Surgical Facility: [facility]  Primary Physician: Georgia Vázquez  Type of Anesthesia Anticipated: to be determined    Patient has a Health Care Directive or Living Will:  YES     1. NO - Do you have a history of heart attack, stroke, stent, bypass or surgery on an artery in the head, neck, heart or legs?  2. NO - Do you ever have any pain or discomfort in your chest?  3. NO - Do you have a history of  Heart Failure?  4. NO - Are you troubled by shortness of breath when: walking on the level, up a slight hill or at night?  5. NO - Do you currently have a cold, bronchitis or other respiratory infection?  6. YES - DO YOU HAVE A COUGH, SHORTNESS OF BREATH OR WHEEZING? Chronic and stable related to allergies.  7. NO - Do you sometimes get pains in the calves of your legs when you walk?  8. NO - Do you or anyone in your family have previous history of blood clots?  9. NO - Do you or does anyone in your family have a serious bleeding problem such as prolonged bleeding following surgeries or cuts?  10. YES - HAVE YOU EVER HAD PROBLEMS WITH ANEMIA OR BEEN TOLD TO TAKE IRON PILLS? Has history of iron deficiency and is on iron therapy.  11. NO - Have you had any abnormal blood loss such as black, tarry or bloody stools, or abnormal vaginal bleeding?  12. YES - HAVE YOU EVER HAD A BLOOD TRANSFUSION? After surgery several times in the past.  13. YES - HAVE YOU OR ANY OF YOUR RELATIVES EVER HAD PROBLEMS WITH ANESTHESIA? Patient  has had issues with vomiting once after anesthesia, but has not had any problems in recent years.  14. YES - DO YOU HAVE SLEEP APNEA, EXCESSIVE SNORING OR DAYTIME DROWSINESS? Patient has SHERRY and uses CPAP.  15. NO - Do you have any prosthetic heart valves?  16. YES - DO YOU HAVE PROSTHETIC JOINTS? Right hip, bilateral knees, right 2nd and 3rd MCP joints  17. NO - Is there any chance that you may be pregnant?    Dahiana Flores, CNP     HPI:                                                      Brief HPI related to upcoming procedure: patient will undergo lumbar spine surgery to help relieve chronic back pain.      See problem list for active medical problems.  Problems all longstanding and stable, except as noted/documented.  See ROS for pertinent symptoms related to these conditions.                                                                                                  .  HYPERLIPIDEMIA - Patient has a long history of significant Hyperlipidemia requiring medication for treatment with recent good control. Patient reports no problems or side effects with the medication.                                                                                                                                                       .  DEPRESSION - Patient has a long history of Depression of moderate severity requiring medication for control with recent symptoms being stable..Current symptoms of depression include none.                                                                                                                                                                                    .  ANEMIA - Patient has a recent history of moderate severity anemia, which has not been symptomatic. Work up to date has revealed iron deficieny. Treatment has been iron transfusion.                                                                                                                   .    MEDICAL HISTORY:                                                       Patient Active Problem List    Diagnosis Date Noted     Allergic state, subsequent encounter 03/01/2017     Priority: Medium     BMI 32.0-32.9,adult 03/01/2017     Priority: Medium     Spinal stenosis of lumbar region without neurogenic claudication 03/01/2017     Priority: Medium     Herniated nucleus pulposus, L3-4 03/01/2017     Priority: Medium     Chronic bilateral low back pain without sciatica 07/17/2016     Priority: Medium     Hyperglycemia 04/29/2016     Priority: Medium     Iron malabsorption 04/27/2016     Priority: Medium     Personal history of healed osteoporosis fracture 01/26/2016     Priority: Medium     MGD (meibomian gland dysfunction) 01/19/2016     Priority: Medium     Blepharitis of both eyes 01/19/2016     Priority: Medium     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 01/04/2016     Priority: Medium     Obesity 08/28/2015     Priority: Medium     Other chronic pain 08/04/2015     Priority: Medium     Patient is followed by [facility] for ongoing prescription of pain medication.  All refills should be approved by this provider, or covering partner.    Medication(s):. Oxycodone 5 mg   Maximum quantity per month: 60  Clinic visit frequency required: Q 3 months     Controlled substance agreement on file: Yes       Date(s): 5/2014    Pain Clinic evaluation in the past: Yes       Date/Location:  7/8/15 [facility] Total Score(s):  No flowsheet data found.    Last [facility] website verification:    [email or web address]        Stenosis, spinal, lumbar 07/07/2015     Priority: Medium     Cornea guttata, ou 05/28/2015     Priority: Medium     Conjunctival concretions 05/28/2015     Priority: Medium     Migraine 04/27/2014     Priority: Medium     SHERRY (obstructive sleep apnea)-severe (AHI 35) 01/24/2014     Priority: Medium     Polysomnography 1/20/2014: (214.0 lbs). The lowest oxygen saturation was 88.0%. Apnea/Hypopnea Index was 35.4  events per hour.  The REM AHI was N/A.  The RERA index was 19.7 per hour.   The RDI was 55.1. On CPAP optimal pressure was 9 with an AHI of 0.5 including lateral REM sleep. During the diagnostic portion of the study, PLM index was 99.0 per hour.  During the treatment portion of the study, PLM index was 46.6 per hour.        Neuropathy (H) 07/24/2013     Priority: Medium     Diagnosed based on symptoms in July 24, 2013  Increased cymbalta to 60 mg daily.  No emg done to date       Anemia of chronic disease 05/17/2013     Priority: Medium     zEncounter for counseling 12/07/2012     Priority: Medium     Overview:   Patient has identified Health Care Agent(s): Yes  Add Health Care Agents: Yes    Health Care Agent(s):  Primary Health Care Agent: Jamila Relationship: daughter Phone: [phone]   Secondary Health Care Agent:  Relationship:  Phone:    Conservator:  Relationship:  Phone:    Guardian: Relationship:  Phone:      Patient has Advance Care Plan Documents (Health Care Directive, POLST): Yes    Advance Care Plan Documents:  Health Care Directive    Patient has identified Specific Treatment Preferences: Yes   Specific Treatment Preferences: a.) Code Status:  CPR/Attempt Resuscitation        Advance Care Planning 05/15/2012     Priority: Medium     Advance Care Planning 7/7/2016: Receipt of ACP document:  Received: POLST which was signed and dated by provider on 4-12-16.  Document previously scanned on 4-18-16.  Has a previous POLST dated 9-30-16 and a previous Resuscitation Guidelines dated 4-7-16. Orders reviewed and found to be valid.  Code Status needs to be updated to reflect choices in most recent ACP document. Orders are DNI only.  Confirmed/documented designated decision maker(s).  Added by Bailey Morales RN Advance Care Planning Liaison with Leonie Morrison  Advance Care Planning 7/7/2016: Receipt of ACP document:  Received: Health Care Directive which was witnessed or notarized on 4-25-16.  Document  previously scanned on 5-20-16.  Validation form completed and sent to be scanned.  Code Status needs to be updated to reflect choices in most recent ACP document.  Confirmed/documented designated decision maker(s).  Added by Bailey Morales  Advance Care Planning 5/15/2012: Patient states has Advance Directive and will bring in a copy to clinic.            Mild major depression (H) 07/23/2011     Priority: Medium     Hyperlipidemia LDL goal <100 07/23/2011     Priority: Medium     GERD (gastroesophageal reflux disease) 04/12/2011     Priority: Medium     nexium worked but insurance would not pay for it.  Was changed to omeprazole and has breakthrough reflux on 40 mg daily.       Psychophysiologic insomnia 01/04/2011     Priority: Medium     ambien use ok       Pulmonary nodule 01/04/2011     Priority: Medium     Ct needed in 10/11       Multinodular goiter      Priority: Medium     Ultrasound in 10/10       Mitral Regurgitation 10/01/2010     Priority: Medium     History of colonic polyps      Priority: Medium     tubular adenoma  Problem list name updated by automated process. Provider to review       RA (Rheumatoid Arthritis) off Plaquenil 06/01/2009     Priority: Medium     Patient is followed by [facility] for ongoing prescription of narcotic pain medicine.  Med: oxycodone.   Maximum use per month: 90  Expected duration: lifelong  Narcotic agreement on file: YES  Clinic visit recommended: Q 3 months         Osteoporosis 02/27/2008     Priority: Medium     PVD (POSTERIOR VITREOUS DETACHMENT) OU 01/12/2011     Priority: Low     PXF (PSEUDOEXFOLIATION OF LENS CAPSULE) OD 01/12/2011     Priority: Low     PSEUDOPHAKIA OU 01/07/2011     Priority: Low     CARDIOVASCULAR SCREENING; LDL GOAL LESS THAN 130 10/31/2010     Priority: Low     Menopause      Priority: Low      Past Medical History:   Diagnosis Date     Acute posthemorrhagic anemia 10/13/2012     Ex-smoker 01/99     History of blood transfusion       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     History of total knee replacement 7/23/2009     Menopause late 40's     Other chronic pain     joints     Pelvic fracture (H) 5/13/2014     PUD (peptic ulcer disease)      Rheumatoid arteritis      Sleep apnea     Uses a CPAP     Vitamin B12 deficiency      Past Surgical History:   Procedure Laterality Date     ABDOMEN SURGERY      c-sections     ARTHROSCOPY KNEE RT/LT  01/06    left     BACK SURGERY  2013    disc     BREAST BIOPSY, RT/LT      left benign     BREAST SURGERY  90's    lumpectomy     C ANESTH,TOTAL HIP ARTHROPLASTY  2010    Rt hip     C HAND/FINGER SURGERY UNLISTED  11/05    right hand     C NONSPECIFIC PROCEDURE  91    left foot surgery     C NONSPECIFIC PROCEDURE  95    R MCP surgery     C TOTAL KNEE ARTHROPLASTY  2006    left     C/SECTION, LOW TRANSVERSE  66,72    x 2     CATARACT IOL, RT/LT       COLONOSCOPY  2006,2009     EYE SURGERY      cataracs     HC ESOPH/GAS REFLUX TEST W NASAL IMPED >1 HR  2/1/2012    Procedure:ESOPHAGEAL IMPEDENCE FUNCTION TEST WITH 24 HOUR PH GREATER THAN 1 HOUR; Surgeon:KAYKAY JULIO; Location:[facility]     IR CAUDAL EPIDURAL INJECTION SINGLE  5/2/2012    LESI L5-S1 at [facility]     SURGICAL HISTORY OF -   2007    right knee total replacement     Current Outpatient Prescriptions   Medication Sig Dispense Refill     oxyCODONE (ROXICODONE) 5 MG IR tablet Take 1 tablet (5 mg) by mouth every 6 hours as needed for pain maximum 4 tablet(s) per day 30 tablet 0     fexofenadine (ALLEGRA) 180 MG tablet Take 1 tablet (180 mg) by mouth daily 30 tablet 1     sertraline (ZOLOFT) 25 MG tablet Take 25 mg by mouth       methotrexate 2.5 MG tablet Take 10 tablets (25 mg) by mouth once a week (Patient taking differently: Take 25 mg by mouth once a week Thursday) 120 tablet 1     atorvastatin (LIPITOR) 40 MG tablet TAKE ONE TABLET BY MOUTH EVERY DAY 90 tablet 2     pantoprazole (PROTONIX) 40 MG enteric coated tablet TAKE ONE TABLET BY MOUTH  ONCE DAILY 30-60 MINUTES BEFORE A MEAL 90 tablet 1     order for [facility] Respironics REMSTAR 60 Series Auto CPAP 12-15 cm H2O, Wisp nasal mask w/a s/m cushion       VITAMIN E PO Take 2,000 Units by mouth       fluticasone (FLONASE) 50 MCG/ACT nasal spray Spray 2 sprays into both nostrils daily (Patient taking differently: Spray 2 sprays into both nostrils daily as needed ) 16 g 3     Omega-3 Fatty Acids (OMEGA-3 FISH OIL PO) Take 2,000 mg by mouth daily        folic acid (FOLVITE) 1 MG tablet Take 1 tablet (1 mg) by mouth daily 100 tablet 3     multivitamin, therapeutic with minerals (MULTI-VITAMIN) TABS Take 1 tablet by mouth daily       magnesium oxide (MAG-OX) 400 (241.3 MG) MG tablet Take 1 tablet (400 mg) by mouth daily       propylene glycol (SYSTANE BALANCE) 0.6 % SOLN ophthalmic solution Place 1 drop into both eyes 4 times daily       topiramate (TOPAMAX) 25 MG tablet Take 1 in the morning and 2 at bedtime. 270 tablet 4     tofacitinib (XELJANZ XR) 11 MG XR tablet Take 1 tablet (11 mg) by mouth daily 90 tablet 3     ferrous gluconate (FERGON) 324 (38 FE) MG tablet Take 1 tablet (324 mg) by mouth 3 times daily (with meals) 270 tablet 1     cyanocobalamin 500 MCG TABS Take 1 tablet by mouth 2 times daily       Calcium Citrate-Vitamin D (CALCIUM CITRATE + PO) Take 2 tablets by mouth daily       Cholecalciferol (VITAMIN D-3 PO) Take 1,000 Units by mouth 2 times daily       Ascorbic Acid 1000 MG TABS Take 1 tablet by mouth 3 times daily       acetaminophen (TYLENOL) 500 MG tablet Take 2 tablets (1,000 mg) by mouth every 8 hours as needed for pain 100 tablet 0     vitamin  B complex with vitamin C (VITAMIN  B COMPLEX) TABS Take 1 tablet by mouth daily.  0     Calcium Carbonate Antacid (TUMS PO) Take by mouth as needed Reported on 2/23/2017       aspirin 81 MG tablet Take 1 tablet by mouth daily.       [DISCONTINUED] methotrexate 2.5 MG tablet Take 10 tablets (25 mg) by mouth once a week .  Split dose between AM and PM  "(5 tabs in the morning, and 5 tabs in the evening; all taken within the same day each week) 120 tablet 1     OTC products: None, except as noted above    Allergies   Allergen Reactions     Abatacept      Severe headaches     Adhesive Tape      Plastic tape     Celebrex [Celecoxib]      Ineffective     Ethanol      Antihistamines     Orencia [Abatacept]      Headache     Septra [Bactrim]      Sulfa Drugs      \"deathly ill\"     Tramadol      Headache      Latex Allergy: NO    Social History   Substance Use Topics     Smoking status: Former Smoker     Packs/day: 1.00     Years: 41.00     Types: Cigarettes     Quit date: 1/1/1999     Smokeless tobacco: Never Used     Alcohol use 0.0 oz/week      Comment: Periodically.     History   Drug Use No       REVIEW OF SYSTEMS:                                                    Constitutional, neuro, ENT, endocrine, pulmonary, cardiac, gastrointestinal, genitourinary, musculoskeletal, integument and psychiatric systems are negative, except as otherwise noted.    EXAM:                                                    /60  Pulse 73  Temp 97.3  F (36.3  C) (Oral)  Resp 14  Ht 5' 5\" (1.651 m)  Wt 191 lb (86.6 kg)  SpO2 96%  Breastfeeding? No  BMI 31.78 kg/m2    GENERAL APPEARANCE: healthy, alert and no distress     EYES: EOMI, PERRL     HENT: ear canals and TM's normal and nose and mouth without ulcers or lesions     NECK: no adenopathy, no asymmetry, masses, or scars and thyroid normal to palpation     RESP: lungs clear to auscultation - no rales, rhonchi or wheezes     CV: regular rates and rhythm, normal S1 S2, no S3 or S4 and no murmur, click or rub     ABDOMEN:  soft, nontender, no HSM or masses and bowel sounds normal     MS: extremities normal- no gross deformities noted, no evidence of inflammation in joints, FROM in all extremities.     SKIN: no suspicious lesions or rashes     NEURO: Normal strength and tone, sensory exam grossly normal, mentation intact and " speech normal     PSYCH: mentation appears normal. and affect normal/bright     LYMPHATICS: normal ant/post cervical, supraclavicular nodes    DIAGNOSTICS:                                                      EKG: appears normal, NSR, normal axis, normal intervals, no acute ST/T changes c/w ischemia, no LVH by voltage criteria, unchanged from previous tracings  Labs Resulted Today:   Results for orders placed or performed in visit on 03/27/17   CBC with platelets   Result Value Ref Range    WBC 7.4 4.0 - 11.0 10e9/L    RBC Count 3.98 3.8 - 5.2 10e12/L    Hemoglobin 12.7 11.7 - 15.7 g/dL    Hematocrit 39.0 35.0 - 47.0 %    MCV 98 78 - 100 fl    MCH 31.9 26.5 - 33.0 pg    MCHC 32.6 31.5 - 36.5 g/dL    RDW 15.1 (H) 10.0 - 15.0 %    Platelet Count 403 150 - 450 10e9/L   Basic metabolic panel   Result Value Ref Range    Sodium 139 133 - 144 mmol/L    Potassium 4.0 3.4 - 5.3 mmol/L    Chloride 107 94 - 109 mmol/L    Carbon Dioxide 25 20 - 32 mmol/L    Anion Gap 7 3 - 14 mmol/L    Glucose 96 70 - 99 mg/dL    Urea Nitrogen 11 7 - 30 mg/dL    Creatinine 0.62 0.52 - 1.04 mg/dL    GFR Estimate >90  Non  GFR Calc   >60 mL/min/1.7m2    GFR Estimate If Black >90   GFR Calc   >60 mL/min/1.7m2    Calcium 9.7 8.5 - 10.1 mg/dL   INR   Result Value Ref Range    INR 1.08 0.86 - 1.14   Methicillin Resistant Staph Aureus PCR   Result Value Ref Range    Specimen Description Nares     S Aur Meth Resis PCR  NEG     Negative  MRSA Negative: SA Negative  MRSA and Staphylococcus aureus target DNA not   detected, presumed negative for MRSA and SA colonization or the number of   bacteria present may be below the limit of detection for the assay. FDA   approved assay performed using [employer] GeneXpert(R) real-time PCR.         Recent Labs   Lab Test  02/08/17   1241  01/27/17   1511  10/28/16   1239  10/28/16   1238   04/29/16   1137   09/28/12   1455   07/20/09   1041   HGB  11.5*  11.9  12.0   --    < >  12.4   <  >  10.3*   < >  10.4*   PLT  364  408  339   --    < >  438   < >  515*   < >  455*   INR   --    --    --    --    --    --    --   1.00   --   1.10   NA   --   142  143   --    --   138   < >  138   --   139   POTASSIUM   --   4.1  4.3   --    --   4.3   < >  4.4   --   4.7   CR  0.80  0.62  0.70   --    < >  0.61   < >  0.63   < >  0.55   A1C   --    --    --   5.5   --   6.2*   --    --    --    --     < > = values in this interval not displayed.        IMPRESSION:                                                    Reason for surgery/procedure: chronic low back pain  Diagnosis/reason for consult: Management of comorbid conditions and preoperative exam.      The proposed surgical procedure is considered INTERMEDIATE risk.    REVISED CARDIAC RISK INDEX  The patient has the following serious cardiovascular risks for perioperative complications such as (MI, PE, VFib and 3  AV Block):  No serious cardiac risks  INTERPRETATION: 0 risks: Class I (very low risk - 0.4% complication rate)    The patient has the following additional risks for perioperative complications:  No identified additional risks      ICD-10-CM    1. Preop general physical exam Z01.818 EKG 12-lead complete w/read - Clinics     INR   2. Chronic low back pain, unspecified back pain laterality, with sciatica presence unspecified M54.5 oxyCODONE (ROXICODONE) 5 MG IR tablet    G89.29    3. Rheumatoid arthritis involving multiple sites with positive rheumatoid factor (H) M05.79 CBC with platelets     Basic metabolic panel   4. Chronic sinusitis, unspecified location J32.9 OTOLARYNGOLOGY REFERRAL   5. Pre-operative laboratory examination Z01.812 Methicillin Resistant Staph Aureus PCR     Patient complains of chronic sinus and allergy symptoms for several months.  She has completed several courses of antibiotics.  I recommended follow-up with ENT.    RECOMMENDATIONS:                                                      --Consult hospital rounder / IM to  assist post-op medical management    Cardiovascular Risk  Performs 4 METs exercise without symptoms (Light housework (dusting, washing dishes)) .       Obstructive Sleep Apnea (or suspected sleep apnea)  Patient is to bring their home CPAP with them on the day of surgery      Anemia  Anemia and does not require treatment prior to surgery.  Monitor Hemoglobin postoperatively.      --Patient is to take all scheduled medications on the day of surgery EXCEPT for modifications listed below.    Anticoagulant or Antiplatelet Medication Use  ASPIRIN: Discontinue ASA 7-10 days prior to procedure to reduce bleeding risk.  It should be resumed post-operatively.        Preoperative Joint replacement and Rheumatologic DMARD Use  The pending procedure is an intervertebral disc arthroplasty or a total joint arthroplasty at the hip (WALTER), knee (TKA), ankle shoulder, wrist or elbow.    --Methotrexate: Stop for 2 weeks before and 2 weeks after the procedure  --Tofacitinib: Stop for 1 week before and 1 week after the procedure    From patient's rheumatologist Dr. Nico Byers:    Tofacitinib  is not to be restarted until after the incision has healed and the surgeon gives the okay to restart it.     Prednisone can be given at the discretion of the surgeon for significant pain.  I'm usually okay with prednisone 2.5-5mg daily, but again will depend on the surgeon.      APPROVAL GIVEN to proceed with proposed procedure, without further diagnostic evaluation       Signed Electronically by: AKIRA Jones CNP    Copy of this evaluation report is provided to requesting physician.    [facility] Preop Guidelines

## 2017-03-27 ENCOUNTER — OFFICE VISIT (OUTPATIENT)
Dept: FAMILY MEDICINE | Facility: CLINIC | Age: 77
End: 2017-03-27
Payer: COMMERCIAL

## 2017-03-27 VITALS
BODY MASS INDEX: 31.82 KG/M2 | WEIGHT: 191 LBS | OXYGEN SATURATION: 96 % | DIASTOLIC BLOOD PRESSURE: 60 MMHG | RESPIRATION RATE: 14 BRPM | TEMPERATURE: 97.3 F | HEIGHT: 65 IN | SYSTOLIC BLOOD PRESSURE: 110 MMHG | HEART RATE: 73 BPM

## 2017-03-27 DIAGNOSIS — M05.79 RHEUMATOID ARTHRITIS INVOLVING MULTIPLE SITES WITH POSITIVE RHEUMATOID FACTOR (H): Chronic | ICD-10-CM

## 2017-03-27 DIAGNOSIS — G89.29 CHRONIC LOW BACK PAIN, UNSPECIFIED BACK PAIN LATERALITY, WITH SCIATICA PRESENCE UNSPECIFIED: ICD-10-CM

## 2017-03-27 DIAGNOSIS — M54.5 CHRONIC LOW BACK PAIN, UNSPECIFIED BACK PAIN LATERALITY, WITH SCIATICA PRESENCE UNSPECIFIED: ICD-10-CM

## 2017-03-27 DIAGNOSIS — Z01.812 PRE-OPERATIVE LABORATORY EXAMINATION: ICD-10-CM

## 2017-03-27 DIAGNOSIS — J32.9 CHRONIC SINUSITIS, UNSPECIFIED LOCATION: ICD-10-CM

## 2017-03-27 DIAGNOSIS — Z01.818 PREOP GENERAL PHYSICAL EXAM: Primary | ICD-10-CM

## 2017-03-27 LAB
ANION GAP SERPL CALCULATED.3IONS-SCNC: 7 MMOL/L (ref 3–14)
BUN SERPL-MCNC: 11 MG/DL (ref 7–30)
CALCIUM SERPL-MCNC: 9.7 MG/DL (ref 8.5–10.1)
CHLORIDE SERPL-SCNC: 107 MMOL/L (ref 94–109)
CO2 SERPL-SCNC: 25 MMOL/L (ref 20–32)
CREAT SERPL-MCNC: 0.62 MG/DL (ref 0.52–1.04)
ERYTHROCYTE [DISTWIDTH] IN BLOOD BY AUTOMATED COUNT: 15.1 % (ref 10–15)
GFR SERPL CREATININE-BSD FRML MDRD: NORMAL ML/MIN/1.7M2
GLUCOSE SERPL-MCNC: 96 MG/DL (ref 70–99)
HCT VFR BLD AUTO: 39 % (ref 35–47)
HGB BLD-MCNC: 12.7 G/DL (ref 11.7–15.7)
INR PPP: 1.08 (ref 0.86–1.14)
MCH RBC QN AUTO: 31.9 PG (ref 26.5–33)
MCHC RBC AUTO-ENTMCNC: 32.6 G/DL (ref 31.5–36.5)
MCV RBC AUTO: 98 FL (ref 78–100)
MRSA DNA SPEC QL NAA+PROBE: NORMAL
PLATELET # BLD AUTO: 403 10E9/L (ref 150–450)
POTASSIUM SERPL-SCNC: 4 MMOL/L (ref 3.4–5.3)
RBC # BLD AUTO: 3.98 10E12/L (ref 3.8–5.2)
SODIUM SERPL-SCNC: 139 MMOL/L (ref 133–144)
SPECIMEN SOURCE: NORMAL
WBC # BLD AUTO: 7.4 10E9/L (ref 4–11)

## 2017-03-27 PROCEDURE — 85610 PROTHROMBIN TIME: CPT | Performed by: NURSE PRACTITIONER

## 2017-03-27 PROCEDURE — 99215 OFFICE O/P EST HI 40 MIN: CPT | Performed by: NURSE PRACTITIONER

## 2017-03-27 PROCEDURE — 87641 MR-STAPH DNA AMP PROBE: CPT | Performed by: NEUROLOGICAL SURGERY

## 2017-03-27 PROCEDURE — 85027 COMPLETE CBC AUTOMATED: CPT | Performed by: NURSE PRACTITIONER

## 2017-03-27 PROCEDURE — 93000 ELECTROCARDIOGRAM COMPLETE: CPT | Performed by: NURSE PRACTITIONER

## 2017-03-27 PROCEDURE — 36415 COLL VENOUS BLD VENIPUNCTURE: CPT | Performed by: NURSE PRACTITIONER

## 2017-03-27 PROCEDURE — 87640 STAPH A DNA AMP PROBE: CPT | Mod: 59 | Performed by: NEUROLOGICAL SURGERY

## 2017-03-27 PROCEDURE — 80048 BASIC METABOLIC PNL TOTAL CA: CPT | Performed by: NURSE PRACTITIONER

## 2017-03-27 RX ORDER — OXYCODONE HYDROCHLORIDE 5 MG/1
5 TABLET ORAL EVERY 6 HOURS PRN
Qty: 30 TABLET | Refills: 0 | Status: ON HOLD | OUTPATIENT
Start: 2017-03-27 | End: 2017-04-05

## 2017-03-27 ASSESSMENT — PAIN SCALES - GENERAL: PAINLEVEL: SEVERE PAIN (7)

## 2017-03-27 NOTE — MR AVS SNAPSHOT
After Visit Summary   3/27/2017    Ginger Marshall    MRN: [id number]           Patient Information     Date Of Birth          1940        Visit Information        Provider Department      3/27/2017 10:20 AM Dahiana Flores APRN [facility]        Today's Diagnoses     Preop general physical exam    -  1    Chronic low back pain, unspecified back pain laterality, with sciatica presence unspecified        Rheumatoid arthritis involving multiple sites with positive rheumatoid factor (H)        Chronic sinusitis, unspecified location          Care Instructions    Stop aspirin, ibuprofen, aleve, fish oil 7 days before surgery.   Stop methotrexate 2 weeks before surgery.  Stop xeljanz 1 week before surgery.  On the morning of surgery okay to take cetirizine, zoloft, protonix, topamax.    Before Your Surgery      Call your surgeon if there is any change in your health. This includes signs of a cold or flu (such as a sore throat, runny nose, cough, rash or fever).    Do not smoke, drink alcohol or take over the counter medicine (unless your surgeon or primary care doctor tells you to) for the 24 hours before and after surgery.    If you take prescribed drugs: Follow your doctor s orders about which medicines to take and which to stop until after surgery.    Eating and drinking prior to surgery: follow the instructions from your surgeon    Take a shower or bath the night before surgery. Use the soap your surgeon gave you to gently clean your skin. If you do not have soap from your surgeon, use your regular soap. Do not shave or scrub the surgery site.  Wear clean pajamas and have clean sheets on your bed.       [facility]    If you have any questions regarding to your visit please contact your care team:     Team Pink:   Clinic Hours Telephone Number   Internal Medicine:  Dr. Georgia Flores, NP       7am-7pm  Monday - Thursday    7am-5pm  Fridays  [phone]  (Appointment scheduling available 24/7)    Questions about your visit?  Team Line  [phone]   Urgent Care - [place] - 11am-9pm Monday-Friday Saturday-Sunday- 9am-5pm   [place] - 5pm-9pm Monday-Friday Saturday-Sunday- 9am-5pm  [phone] - [place]   [phone] - [place]       What options do I have for visits at the clinic other than the traditional office visit?  To expand how we care for you, many of our providers are utilizing electronic visits (e-visits) and telephone visits, when medically appropriate, for interactions with their patients rather than a visit in the clinic.   We also offer nurse visits for many medical concerns. Just like any other service, we will bill your insurance company for this type of visit based on time spent on the phone with your provider. Not all insurance companies cover these visits. Please check with your medical insurance if this type of visit is covered. You will be responsible for any charges that are not paid by your insurance.      E-visits via Totangot:  generally incur a $35.00 fee.  Telephone visits:  Time spent on the phone: *charged based on time that is spent on the phone in increments of 10 minutes. Estimated cost:   5-10 mins $30.00   11-20 mins. $59.00   21-30 mins. $85.00   Use [employer] (secure email communication and access to your chart) to send your primary care provider a message or make an appointment. Ask someone on your Team how to sign up for [employer].    For a Price Quote for your services, please call our Consumer Price Line at [phone].    As always, Thank you for trusting us with your health care needs!    Estephania Ibarra, TOSHA          Follow-ups after your visit        Additional Services     OTOLARYNGOLOGY REFERRAL       Your provider has referred you to: [facility]: [facility] [phone]   [email or web address]    Please be aware that  coverage of these services is subject to the terms and limitations of your health insurance plan.  Call member services at your health plan with any benefit or coverage questions.      Please bring the following with you to your appointment:    (1) Any X-Rays, CTs or MRIs which have been performed.  Contact the facility where they were done to arrange for  prior to your scheduled appointment.   (2) List of current medications  (3) This referral request   (4) Any documents/labs given to you for this referral                  Your next 10 appointments already scheduled     Apr 03, 2017   Procedure with Anthony Michele MD   [facility] (--)    [address]  [facility] [address]   [phone]            Apr 05, 2017 11:00 AM CDT   Nurse Only with FZ ANCILLARY   [facility] [place] ([facility] [place])    [address] [address]   [phone]            May 05, 2017 11:00 AM CDT   LAB with  LAB   [facility] [place] ([facility] [place])    [address] [address]   [phone]           Patient must bring picture ID.  Patient should be prepared to give a urine specimen  Please do not eat 10-12 hours before your appointment if you are coming in fasting for labs on lipids, cholesterol, or glucose (sugar).  Pregnant women should follow their Care Team instructions. Water with medications is okay. Do not drink coffee or other fluids.   If you have concerns about taking  your medications, please ask at office or if scheduling via [facility], send a message by clicking on Secure Messaging, Message Your Care Team.            May 10, 2017 10:15 AM CDT   Nurse Only with FZ ANCILLARY   [facility] [place] ([facility] [place])    [address] [address]   [phone]            May 10, 2017 10:40 AM CDT   Return Visit with Nico Byers MD   [facility] [place] ([facility]  "[place])    [address]  [place] [address]   [phone]            Jun 07, 2017 11:00 AM CDT   Nurse Only with FZ ANCILLARY   [facility] ([facility])    [address] [place] [address]   [phone]            Sep 25, 2017  1:00 PM CDT   Return Visit with Arlet Gonzalez MD   [facility] ([facility]    [address] [place] [id number] [phone]              Who to contact     If you have questions or need follow up information about today's clinic visit or your schedule please contact [facility] directly at [phone].  Normal or non-critical lab and imaging results will be communicated to you by [employer], letter or phone within 4 business days after the clinic has received the results. If you do not hear from us within 7 days, please contact the clinic through [employer] or phone. If you have a critical or abnormal lab result, we will notify you by phone as soon as possible.  Submit refill requests through [employer] or call your pharmacy and they will forward the refill request to us. Please allow 3 business days for your refill to be completed.          Additional Information About Your Visit        [employer] Information     [employer] gives you secure access to your electronic health record. If you see a primary care provider, you can also send messages to your care team and make appointments. If you have questions, please call your primary care clinic.  If you do not have a primary care provider, please call [phone] and they will assist you.        Care EveryWhere ID     This is your Care EveryWhere ID. This could be used by other organizations to access your [place] medical records  [id number]        Your Vitals Were     Pulse Temperature Respirations Height Pulse Oximetry Breastfeeding?    73 97.3  F (36.3  C) (Oral) 14 5' 5\" (1.651 m) 96% No    BMI (Body Mass Index)             "       31.78 kg/m2            Blood Pressure from Last 3 Encounters:   03/27/17 110/60   03/07/17 112/58   03/01/17 122/68    Weight from Last 3 Encounters:   03/27/17 191 lb (86.6 kg)   03/07/17 195 lb (88.5 kg)   03/01/17 195 lb 8 oz (88.7 kg)              We Performed the Following     Basic metabolic panel     CBC with platelets     EKG 12-lead complete w/read - Clinics     INR     OTOLARYNGOLOGY REFERRAL          Today's Medication Changes          These changes are accurate as of: 3/27/17 11:18 AM.  If you have any questions, ask your nurse or doctor.               Start taking these medicines.        Dose/Directions    oxyCODONE 5 MG IR tablet   Commonly known as:  ROXICODONE   Used for:  Chronic low back pain, unspecified back pain laterality, with sciatica presence unspecified   Started by:  Dahiana Flores APRN CNP        Dose:  5 mg   Take 1 tablet (5 mg) by mouth every 6 hours as needed for pain maximum 4 tablet(s) per day   Quantity:  30 tablet   Refills:  0         These medicines have changed or have updated prescriptions.        Dose/Directions    fluticasone 50 MCG/ACT spray   Commonly known as:  FLONASE   This may have changed:    - when to take this  - reasons to take this   Used for:  Post-nasal drip        Dose:  2 spray   Spray 2 sprays into both nostrils daily   Quantity:  16 g   Refills:  3       methotrexate 2.5 MG tablet   This may have changed:  additional instructions   Used for:  Rheumatoid arthritis involving multiple sites with positive rheumatoid factor (H), High risk medications (not anticoagulants) long-term use        Dose:  25 mg   Take 10 tablets (25 mg) by mouth once a week   Quantity:  120 tablet   Refills:  1            Where to get your medicines      Some of these will need a paper prescription and others can be bought over the counter.  Ask your nurse if you have questions.     Bring a paper prescription for each of these medications     oxyCODONE 5 MG IR tablet                 Primary Care Provider Office Phone # Fax #    Georgia Vázquez [phone]       [address]  [place] [address]        Thank you!     Thank you for choosing [facility]  for your care. Our goal is always to provide you with excellent care. Hearing back from our patients is one way we can continue to improve our services. Please take a few minutes to complete the written survey that you may receive in the mail after your visit with us. Thank you!             Your Updated Medication List - Protect others around you: Learn how to safely use, store and throw away your medicines at www.disposemymeds.org.          This list is accurate as of: 3/27/17 11:18 AM.  Always use your most recent med list.                   Brand Name Dispense Instructions for use    ascorbic acid 1000 MG Tabs tablet      Take 1 tablet by mouth 3 times daily       aspirin 81 MG tablet      Take 1 tablet by mouth daily.       atorvastatin 40 MG tablet    LIPITOR    90 tablet    TAKE ONE TABLET BY MOUTH EVERY DAY       CALCIUM CITRATE + PO      Take 2 tablets by mouth daily       cyanocobalamin 500 MCG Tabs      Take 1 tablet by mouth 2 times daily       ferrous gluconate 324 (38 FE) MG tablet    FERGON    270 tablet    Take 1 tablet (324 mg) by mouth 3 times daily (with meals)       fexofenadine 180 MG tablet    ALLEGRA    30 tablet    Take 1 tablet (180 mg) by mouth daily       fluticasone 50 MCG/ACT spray    FLONASE    16 g    Spray 2 sprays into both nostrils daily       folic acid 1 MG tablet    FOLVITE    100 tablet    Take 1 tablet (1 mg) by mouth daily       magnesium oxide 400 (241.3 MG) MG tablet    MAG-OX     Take 1 tablet (400 mg) by mouth daily       methotrexate 2.5 MG tablet     120 tablet    Take 10 tablets (25 mg) by mouth once a week       Multi-vitamin Tabs tablet      Take 1 tablet by mouth daily       OMEGA-3 FISH OIL PO      Take 2,000 mg by mouth  daily       order for DME      Respironics REMSTAR 60 Series Auto CPAP 12-15 cm H2O, Wisp nasal mask w/a s/m cushion       oxyCODONE 5 MG IR tablet    ROXICODONE    30 tablet    Take 1 tablet (5 mg) by mouth every 6 hours as needed for pain maximum 4 tablet(s) per day       pantoprazole 40 MG EC tablet    PROTONIX    90 tablet    TAKE ONE TABLET BY MOUTH ONCE DAILY 30-60 MINUTES BEFORE A MEAL       propylene glycol 0.6 % Soln ophthalmic solution    SYSTANE BALANCE     Place 1 drop into both eyes 4 times daily       tofacitinib 11 MG 24 hr tablet    XELJANZ XR    90 tablet    Take 1 tablet (11 mg) by mouth daily       topiramate 25 MG tablet    TOPAMAX    270 tablet    Take 1 in the morning and 2 at bedtime.       TUMS PO      Take by mouth as needed Reported on 2/23/2017       TYLENOL 500 MG tablet   Generic drug:  acetaminophen     100 tablet    Take 2 tablets (1,000 mg) by mouth every 8 hours as needed for pain       vitamin B complex with vitamin C Tabs tablet      Take 1 tablet by mouth daily.       VITAMIN D-3 PO      Take 1,000 Units by mouth 2 times daily       VITAMIN E PO      Take 2,000 Units by mouth       ZOLOFT 25 MG tablet   Generic drug:  sertraline      Take 25 mg by mouth

## 2017-03-27 NOTE — NURSING NOTE
"Chief Complaint   Patient presents with     Pre-Op Exam     [facility], 04/03/2017       Initial /60  Pulse 73  Temp 97.3  F (36.3  C) (Oral)  Resp 14  Ht 5' 5\" (1.651 m)  Wt 191 lb (86.6 kg)  SpO2 96%  Breastfeeding? No  BMI 31.78 kg/m2 Estimated body mass index is 31.78 kg/(m^2) as calculated from the following:    Height as of this encounter: 5' 5\" (1.651 m).    Weight as of this encounter: 191 lb (86.6 kg).  Medication Reconciliation: complete   Barbara Watkins CMA (AAMA)      "

## 2017-03-27 NOTE — PROGRESS NOTES
Dear Ginger,    Your recent test results are attached.      No anemia.    If you have any questions please feel free to contact [phone] or myself via [employer].    Sincerely,  Dahiana Flores, CNP

## 2017-03-28 RX ORDER — TOPIRAMATE 25 MG/1
25 TABLET, FILM COATED ORAL EVERY MORNING
COMMUNITY
End: 2017-06-16

## 2017-03-28 RX ORDER — TOPIRAMATE 25 MG/1
50 TABLET, FILM COATED ORAL AT BEDTIME
COMMUNITY
End: 2017-06-16 | Stop reason: DRUGHIGH

## 2017-03-28 NOTE — PROGRESS NOTES
Dear Ginger,    Your recent test results are attached.      Normal bleeding time.    If you have any questions please feel free to contact [phone] or myself via [employer].    Sincerely,  Dahiana Flores, CNP

## 2017-03-28 NOTE — PROGRESS NOTES
Dear Ginger,    Your recent test results are attached.      Normal kidney function and electrolytes.      If you have any questions please feel free to contact [phone] or myself via [employer].    Sincerely,  Dahiana Folres, CNP

## 2017-03-29 NOTE — H&P (VIEW-ONLY)
[facility]  [address]  [place] [address]  [phone]  Dept: [phone]    PRE-OP EVALUATION:  Today's date: 3/27/2017    Ginger Marshall (: 1940) presents for pre-operative evaluation assessment as requested by Dr. Michele.  She requires evaluation and anesthesia risk assessment prior to undergoing surgery/procedure for treatment of back pain .  Proposed procedure: L4-L5 TILF    Date of Surgery/ Procedure: 4/3/17  Time of Surgery/ Procedure: 9:30am  Hospital/Surgical Facility: [facility]  Primary Physician: Georgia Vázquez  Type of Anesthesia Anticipated: to be determined    Patient has a Health Care Directive or Living Will:  YES     1. NO - Do you have a history of heart attack, stroke, stent, bypass or surgery on an artery in the head, neck, heart or legs?  2. NO - Do you ever have any pain or discomfort in your chest?  3. NO - Do you have a history of  Heart Failure?  4. NO - Are you troubled by shortness of breath when: walking on the level, up a slight hill or at night?  5. NO - Do you currently have a cold, bronchitis or other respiratory infection?  6. YES - DO YOU HAVE A COUGH, SHORTNESS OF BREATH OR WHEEZING? Chronic and stable related to allergies.  7. NO - Do you sometimes get pains in the calves of your legs when you walk?  8. NO - Do you or anyone in your family have previous history of blood clots?  9. NO - Do you or does anyone in your family have a serious bleeding problem such as prolonged bleeding following surgeries or cuts?  10. YES - HAVE YOU EVER HAD PROBLEMS WITH ANEMIA OR BEEN TOLD TO TAKE IRON PILLS? Has history of iron deficiency and is on iron therapy.  11. NO - Have you had any abnormal blood loss such as black, tarry or bloody stools, or abnormal vaginal bleeding?  12. YES - HAVE YOU EVER HAD A BLOOD TRANSFUSION? After surgery several times in the past.  13. YES - HAVE YOU OR ANY OF YOUR RELATIVES EVER HAD PROBLEMS WITH ANESTHESIA? Patient  has had issues with vomiting once after anesthesia, but has not had any problems in recent years.  14. YES - DO YOU HAVE SLEEP APNEA, EXCESSIVE SNORING OR DAYTIME DROWSINESS? Patient has SHERRY and uses CPAP.  15. NO - Do you have any prosthetic heart valves?  16. YES - DO YOU HAVE PROSTHETIC JOINTS? Right hip, bilateral knees, right 2nd and 3rd MCP joints  17. NO - Is there any chance that you may be pregnant?    Dahiana Flores, CNP     HPI:                                                      Brief HPI related to upcoming procedure: patient will undergo lumbar spine surgery to help relieve chronic back pain.      See problem list for active medical problems.  Problems all longstanding and stable, except as noted/documented.  See ROS for pertinent symptoms related to these conditions.                                                                                                  .  HYPERLIPIDEMIA - Patient has a long history of significant Hyperlipidemia requiring medication for treatment with recent good control. Patient reports no problems or side effects with the medication.                                                                                                                                                       .  DEPRESSION - Patient has a long history of Depression of moderate severity requiring medication for control with recent symptoms being stable..Current symptoms of depression include none.                                                                                                                                                                                    .  ANEMIA - Patient has a recent history of moderate severity anemia, which has not been symptomatic. Work up to date has revealed iron deficieny. Treatment has been iron transfusion.                                                                                                                   .    MEDICAL HISTORY:                                                       Patient Active Problem List    Diagnosis Date Noted     Allergic state, subsequent encounter 03/01/2017     Priority: Medium     BMI 32.0-32.9,adult 03/01/2017     Priority: Medium     Spinal stenosis of lumbar region without neurogenic claudication 03/01/2017     Priority: Medium     Herniated nucleus pulposus, L3-4 03/01/2017     Priority: Medium     Chronic bilateral low back pain without sciatica 07/17/2016     Priority: Medium     Hyperglycemia 04/29/2016     Priority: Medium     Iron malabsorption 04/27/2016     Priority: Medium     Personal history of healed osteoporosis fracture 01/26/2016     Priority: Medium     MGD (meibomian gland dysfunction) 01/19/2016     Priority: Medium     Blepharitis of both eyes 01/19/2016     Priority: Medium     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 01/04/2016     Priority: Medium     Obesity 08/28/2015     Priority: Medium     Other chronic pain 08/04/2015     Priority: Medium     Patient is followed by [facility] for ongoing prescription of pain medication.  All refills should be approved by this provider, or covering partner.    Medication(s):. Oxycodone 5 mg   Maximum quantity per month: 60  Clinic visit frequency required: Q 3 months     Controlled substance agreement on file: Yes       Date(s): 5/2014    Pain Clinic evaluation in the past: Yes       Date/Location:  7/8/15 [facility] Total Score(s):  No flowsheet data found.    Last [facility] website verification:    [email or web address]        Stenosis, spinal, lumbar 07/07/2015     Priority: Medium     Cornea guttata, ou 05/28/2015     Priority: Medium     Conjunctival concretions 05/28/2015     Priority: Medium     Migraine 04/27/2014     Priority: Medium     SHERRY (obstructive sleep apnea)-severe (AHI 35) 01/24/2014     Priority: Medium     Polysomnography 1/20/2014: (214.0 lbs). The lowest oxygen saturation was 88.0%. Apnea/Hypopnea Index was 35.4  events per hour.  The REM AHI was N/A.  The RERA index was 19.7 per hour.   The RDI was 55.1. On CPAP optimal pressure was 9 with an AHI of 0.5 including lateral REM sleep. During the diagnostic portion of the study, PLM index was 99.0 per hour.  During the treatment portion of the study, PLM index was 46.6 per hour.        Neuropathy (H) 07/24/2013     Priority: Medium     Diagnosed based on symptoms in July 24, 2013  Increased cymbalta to 60 mg daily.  No emg done to date       Anemia of chronic disease 05/17/2013     Priority: Medium     zEncounter for counseling 12/07/2012     Priority: Medium     Overview:   Patient has identified Health Care Agent(s): Yes  Add Health Care Agents: Yes    Health Care Agent(s):  Primary Health Care Agent: Jamila Relationship: daughter Phone: [phone]   Secondary Health Care Agent:  Relationship:  Phone:    Conservator:  Relationship:  Phone:    Guardian: Relationship:  Phone:      Patient has Advance Care Plan Documents (Health Care Directive, POLST): Yes    Advance Care Plan Documents:  Health Care Directive    Patient has identified Specific Treatment Preferences: Yes   Specific Treatment Preferences: a.) Code Status:  CPR/Attempt Resuscitation        Advance Care Planning 05/15/2012     Priority: Medium     Advance Care Planning 7/7/2016: Receipt of ACP document:  Received: POLST which was signed and dated by provider on 4-12-16.  Document previously scanned on 4-18-16.  Has a previous POLST dated 9-30-16 and a previous Resuscitation Guidelines dated 4-7-16. Orders reviewed and found to be valid.  Code Status needs to be updated to reflect choices in most recent ACP document. Orders are DNI only.  Confirmed/documented designated decision maker(s).  Added by Bailey Morales RN Advance Care Planning Liaison with Leonie Morrison  Advance Care Planning 7/7/2016: Receipt of ACP document:  Received: Health Care Directive which was witnessed or notarized on 4-25-16.  Document  previously scanned on 5-20-16.  Validation form completed and sent to be scanned.  Code Status needs to be updated to reflect choices in most recent ACP document.  Confirmed/documented designated decision maker(s).  Added by Bailey Morales  Advance Care Planning 5/15/2012: Patient states has Advance Directive and will bring in a copy to clinic.            Mild major depression (H) 07/23/2011     Priority: Medium     Hyperlipidemia LDL goal <100 07/23/2011     Priority: Medium     GERD (gastroesophageal reflux disease) 04/12/2011     Priority: Medium     nexium worked but insurance would not pay for it.  Was changed to omeprazole and has breakthrough reflux on 40 mg daily.       Psychophysiologic insomnia 01/04/2011     Priority: Medium     ambien use ok       Pulmonary nodule 01/04/2011     Priority: Medium     Ct needed in 10/11       Multinodular goiter      Priority: Medium     Ultrasound in 10/10       Mitral Regurgitation 10/01/2010     Priority: Medium     History of colonic polyps      Priority: Medium     tubular adenoma  Problem list name updated by automated process. Provider to review       RA (Rheumatoid Arthritis) off Plaquenil 06/01/2009     Priority: Medium     Patient is followed by [facility] for ongoing prescription of narcotic pain medicine.  Med: oxycodone.   Maximum use per month: 90  Expected duration: lifelong  Narcotic agreement on file: YES  Clinic visit recommended: Q 3 months         Osteoporosis 02/27/2008     Priority: Medium     PVD (POSTERIOR VITREOUS DETACHMENT) OU 01/12/2011     Priority: Low     PXF (PSEUDOEXFOLIATION OF LENS CAPSULE) OD 01/12/2011     Priority: Low     PSEUDOPHAKIA OU 01/07/2011     Priority: Low     CARDIOVASCULAR SCREENING; LDL GOAL LESS THAN 130 10/31/2010     Priority: Low     Menopause      Priority: Low      Past Medical History:   Diagnosis Date     Acute posthemorrhagic anemia 10/13/2012     Ex-smoker 01/99     History of blood transfusion       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     History of total knee replacement 7/23/2009     Menopause late 40's     Other chronic pain     joints     Pelvic fracture (H) 5/13/2014     PUD (peptic ulcer disease)      Rheumatoid arteritis      Sleep apnea     Uses a CPAP     Vitamin B12 deficiency      Past Surgical History:   Procedure Laterality Date     ABDOMEN SURGERY      c-sections     ARTHROSCOPY KNEE RT/LT  01/06    left     BACK SURGERY  2013    disc     BREAST BIOPSY, RT/LT      left benign     BREAST SURGERY  90's    lumpectomy     C ANESTH,TOTAL HIP ARTHROPLASTY  2010    Rt hip     C HAND/FINGER SURGERY UNLISTED  11/05    right hand     C NONSPECIFIC PROCEDURE  91    left foot surgery     C NONSPECIFIC PROCEDURE  95    R MCP surgery     C TOTAL KNEE ARTHROPLASTY  2006    left     C/SECTION, LOW TRANSVERSE  66,72    x 2     CATARACT IOL, RT/LT       COLONOSCOPY  2006,2009     EYE SURGERY      cataracs     HC ESOPH/GAS REFLUX TEST W NASAL IMPED >1 HR  2/1/2012    Procedure:ESOPHAGEAL IMPEDENCE FUNCTION TEST WITH 24 HOUR PH GREATER THAN 1 HOUR; Surgeon:KAYKAY JULIO; Location:[facility]     IR CAUDAL EPIDURAL INJECTION SINGLE  5/2/2012    LESI L5-S1 at [facility]     SURGICAL HISTORY OF -   2007    right knee total replacement     Current Outpatient Prescriptions   Medication Sig Dispense Refill     oxyCODONE (ROXICODONE) 5 MG IR tablet Take 1 tablet (5 mg) by mouth every 6 hours as needed for pain maximum 4 tablet(s) per day 30 tablet 0     fexofenadine (ALLEGRA) 180 MG tablet Take 1 tablet (180 mg) by mouth daily 30 tablet 1     sertraline (ZOLOFT) 25 MG tablet Take 25 mg by mouth       methotrexate 2.5 MG tablet Take 10 tablets (25 mg) by mouth once a week (Patient taking differently: Take 25 mg by mouth once a week Thursday) 120 tablet 1     atorvastatin (LIPITOR) 40 MG tablet TAKE ONE TABLET BY MOUTH EVERY DAY 90 tablet 2     pantoprazole (PROTONIX) 40 MG enteric coated tablet TAKE ONE TABLET BY MOUTH  ONCE DAILY 30-60 MINUTES BEFORE A MEAL 90 tablet 1     order for [facility] Respironics REMSTAR 60 Series Auto CPAP 12-15 cm H2O, Wisp nasal mask w/a s/m cushion       VITAMIN E PO Take 2,000 Units by mouth       fluticasone (FLONASE) 50 MCG/ACT nasal spray Spray 2 sprays into both nostrils daily (Patient taking differently: Spray 2 sprays into both nostrils daily as needed ) 16 g 3     Omega-3 Fatty Acids (OMEGA-3 FISH OIL PO) Take 2,000 mg by mouth daily        folic acid (FOLVITE) 1 MG tablet Take 1 tablet (1 mg) by mouth daily 100 tablet 3     multivitamin, therapeutic with minerals (MULTI-VITAMIN) TABS Take 1 tablet by mouth daily       magnesium oxide (MAG-OX) 400 (241.3 MG) MG tablet Take 1 tablet (400 mg) by mouth daily       propylene glycol (SYSTANE BALANCE) 0.6 % SOLN ophthalmic solution Place 1 drop into both eyes 4 times daily       topiramate (TOPAMAX) 25 MG tablet Take 1 in the morning and 2 at bedtime. 270 tablet 4     tofacitinib (XELJANZ XR) 11 MG XR tablet Take 1 tablet (11 mg) by mouth daily 90 tablet 3     ferrous gluconate (FERGON) 324 (38 FE) MG tablet Take 1 tablet (324 mg) by mouth 3 times daily (with meals) 270 tablet 1     cyanocobalamin 500 MCG TABS Take 1 tablet by mouth 2 times daily       Calcium Citrate-Vitamin D (CALCIUM CITRATE + PO) Take 2 tablets by mouth daily       Cholecalciferol (VITAMIN D-3 PO) Take 1,000 Units by mouth 2 times daily       Ascorbic Acid 1000 MG TABS Take 1 tablet by mouth 3 times daily       acetaminophen (TYLENOL) 500 MG tablet Take 2 tablets (1,000 mg) by mouth every 8 hours as needed for pain 100 tablet 0     vitamin  B complex with vitamin C (VITAMIN  B COMPLEX) TABS Take 1 tablet by mouth daily.  0     Calcium Carbonate Antacid (TUMS PO) Take by mouth as needed Reported on 2/23/2017       aspirin 81 MG tablet Take 1 tablet by mouth daily.       [DISCONTINUED] methotrexate 2.5 MG tablet Take 10 tablets (25 mg) by mouth once a week .  Split dose between AM and PM  "(5 tabs in the morning, and 5 tabs in the evening; all taken within the same day each week) 120 tablet 1     OTC products: None, except as noted above    Allergies   Allergen Reactions     Abatacept      Severe headaches     Adhesive Tape      Plastic tape     Celebrex [Celecoxib]      Ineffective     Ethanol      Antihistamines     Orencia [Abatacept]      Headache     Septra [Bactrim]      Sulfa Drugs      \"deathly ill\"     Tramadol      Headache      Latex Allergy: NO    Social History   Substance Use Topics     Smoking status: Former Smoker     Packs/day: 1.00     Years: 41.00     Types: Cigarettes     Quit date: 1/1/1999     Smokeless tobacco: Never Used     Alcohol use 0.0 oz/week      Comment: Periodically.     History   Drug Use No       REVIEW OF SYSTEMS:                                                    Constitutional, neuro, ENT, endocrine, pulmonary, cardiac, gastrointestinal, genitourinary, musculoskeletal, integument and psychiatric systems are negative, except as otherwise noted.    EXAM:                                                    /60  Pulse 73  Temp 97.3  F (36.3  C) (Oral)  Resp 14  Ht 5' 5\" (1.651 m)  Wt 191 lb (86.6 kg)  SpO2 96%  Breastfeeding? No  BMI 31.78 kg/m2    GENERAL APPEARANCE: healthy, alert and no distress     EYES: EOMI, PERRL     HENT: ear canals and TM's normal and nose and mouth without ulcers or lesions     NECK: no adenopathy, no asymmetry, masses, or scars and thyroid normal to palpation     RESP: lungs clear to auscultation - no rales, rhonchi or wheezes     CV: regular rates and rhythm, normal S1 S2, no S3 or S4 and no murmur, click or rub     ABDOMEN:  soft, nontender, no HSM or masses and bowel sounds normal     MS: extremities normal- no gross deformities noted, no evidence of inflammation in joints, FROM in all extremities.     SKIN: no suspicious lesions or rashes     NEURO: Normal strength and tone, sensory exam grossly normal, mentation intact and " speech normal     PSYCH: mentation appears normal. and affect normal/bright     LYMPHATICS: normal ant/post cervical, supraclavicular nodes    DIAGNOSTICS:                                                      EKG: appears normal, NSR, normal axis, normal intervals, no acute ST/T changes c/w ischemia, no LVH by voltage criteria, unchanged from previous tracings  Labs Resulted Today:   Results for orders placed or performed in visit on 03/27/17   CBC with platelets   Result Value Ref Range    WBC 7.4 4.0 - 11.0 10e9/L    RBC Count 3.98 3.8 - 5.2 10e12/L    Hemoglobin 12.7 11.7 - 15.7 g/dL    Hematocrit 39.0 35.0 - 47.0 %    MCV 98 78 - 100 fl    MCH 31.9 26.5 - 33.0 pg    MCHC 32.6 31.5 - 36.5 g/dL    RDW 15.1 (H) 10.0 - 15.0 %    Platelet Count 403 150 - 450 10e9/L   Basic metabolic panel   Result Value Ref Range    Sodium 139 133 - 144 mmol/L    Potassium 4.0 3.4 - 5.3 mmol/L    Chloride 107 94 - 109 mmol/L    Carbon Dioxide 25 20 - 32 mmol/L    Anion Gap 7 3 - 14 mmol/L    Glucose 96 70 - 99 mg/dL    Urea Nitrogen 11 7 - 30 mg/dL    Creatinine 0.62 0.52 - 1.04 mg/dL    GFR Estimate >90  Non  GFR Calc   >60 mL/min/1.7m2    GFR Estimate If Black >90   GFR Calc   >60 mL/min/1.7m2    Calcium 9.7 8.5 - 10.1 mg/dL   INR   Result Value Ref Range    INR 1.08 0.86 - 1.14   Methicillin Resistant Staph Aureus PCR   Result Value Ref Range    Specimen Description Nares     S Aur Meth Resis PCR  NEG     Negative  MRSA Negative: SA Negative  MRSA and Staphylococcus aureus target DNA not   detected, presumed negative for MRSA and SA colonization or the number of   bacteria present may be below the limit of detection for the assay. FDA   approved assay performed using [employer] GeneXpert(R) real-time PCR.         Recent Labs   Lab Test  02/08/17   1241  01/27/17   1511  10/28/16   1239  10/28/16   1238   04/29/16   1137   09/28/12   1455   07/20/09   1041   HGB  11.5*  11.9  12.0   --    < >  12.4   <  >  10.3*   < >  10.4*   PLT  364  408  339   --    < >  438   < >  515*   < >  455*   INR   --    --    --    --    --    --    --   1.00   --   1.10   NA   --   142  143   --    --   138   < >  138   --   139   POTASSIUM   --   4.1  4.3   --    --   4.3   < >  4.4   --   4.7   CR  0.80  0.62  0.70   --    < >  0.61   < >  0.63   < >  0.55   A1C   --    --    --   5.5   --   6.2*   --    --    --    --     < > = values in this interval not displayed.        IMPRESSION:                                                    Reason for surgery/procedure: chronic low back pain  Diagnosis/reason for consult: Management of comorbid conditions and preoperative exam.      The proposed surgical procedure is considered INTERMEDIATE risk.    REVISED CARDIAC RISK INDEX  The patient has the following serious cardiovascular risks for perioperative complications such as (MI, PE, VFib and 3  AV Block):  No serious cardiac risks  INTERPRETATION: 0 risks: Class I (very low risk - 0.4% complication rate)    The patient has the following additional risks for perioperative complications:  No identified additional risks      ICD-10-CM    1. Preop general physical exam Z01.818 EKG 12-lead complete w/read - Clinics     INR   2. Chronic low back pain, unspecified back pain laterality, with sciatica presence unspecified M54.5 oxyCODONE (ROXICODONE) 5 MG IR tablet    G89.29    3. Rheumatoid arthritis involving multiple sites with positive rheumatoid factor (H) M05.79 CBC with platelets     Basic metabolic panel   4. Chronic sinusitis, unspecified location J32.9 OTOLARYNGOLOGY REFERRAL   5. Pre-operative laboratory examination Z01.812 Methicillin Resistant Staph Aureus PCR     Patient complains of chronic sinus and allergy symptoms for several months.  She has completed several courses of antibiotics.  I recommended follow-up with ENT.    RECOMMENDATIONS:                                                      --Consult hospital rounder / IM to  assist post-op medical management    Cardiovascular Risk  Performs 4 METs exercise without symptoms (Light housework (dusting, washing dishes)) .       Obstructive Sleep Apnea (or suspected sleep apnea)  Patient is to bring their home CPAP with them on the day of surgery      Anemia  Anemia and does not require treatment prior to surgery.  Monitor Hemoglobin postoperatively.      --Patient is to take all scheduled medications on the day of surgery EXCEPT for modifications listed below.    Anticoagulant or Antiplatelet Medication Use  ASPIRIN: Discontinue ASA 7-10 days prior to procedure to reduce bleeding risk.  It should be resumed post-operatively.        Preoperative Joint replacement and Rheumatologic DMARD Use  The pending procedure is an intervertebral disc arthroplasty or a total joint arthroplasty at the hip (WALTER), knee (TKA), ankle shoulder, wrist or elbow.    --Methotrexate: Stop for 2 weeks before and 2 weeks after the procedure  --Tofacitinib: Stop for 1 week before and 1 week after the procedure    From patient's rheumatologist Dr. Nico Byers:    Tofacitinib  is not to be restarted until after the incision has healed and the surgeon gives the okay to restart it.     Prednisone can be given at the discretion of the surgeon for significant pain.  I'm usually okay with prednisone 2.5-5mg daily, but again will depend on the surgeon.      APPROVAL GIVEN to proceed with proposed procedure, without further diagnostic evaluation       Signed Electronically by: AKIRA Jones CNP    Copy of this evaluation report is provided to requesting physician.    [facility] Preop Guidelines

## 2017-03-31 ENCOUNTER — HOSPITAL ENCOUNTER (OUTPATIENT)
Dept: LAB | Facility: CLINIC | Age: 77
Discharge: HOME | DRG: 460 | End: 2017-03-31
Attending: NEUROLOGICAL SURGERY | Admitting: NEUROLOGICAL SURGERY
Payer: COMMERCIAL

## 2017-03-31 DIAGNOSIS — Z01.812 PRE-OPERATIVE LABORATORY EXAMINATION: ICD-10-CM

## 2017-03-31 LAB
ABO + RH BLD: NORMAL
ABO + RH BLD: NORMAL
BLD GP AB SCN SERPL QL: NORMAL
BLOOD BANK CMNT PATIENT-IMP: NORMAL
SPECIMEN EXP DATE BLD: NORMAL

## 2017-03-31 NOTE — PHARMACY-ADMISSION MEDICATION HISTORY
Admission medication history interview status for this patient is complete. See [facility] admission navigator for allergy information, prior to admission medications and immunization status.     Medication history interview source(s):Patient  Medication history resources (including written lists, pill bottles, clinic record):-  Primary pharmacy:-    PTA meds completed by pre-admitting nurse Samantha Burgos and reviewed by pharmacy     Actions taken by pharmacist (provider contacted, etc):None     Additional medication history information:None    Medication reconciliation/reorder completed by provider prior to medication history? N/A    For patients on insulin therapy: No    Prior to Admission medications    Medication Sig Last Dose Taking? Auth Provider   topiramate (TOPAMAX) 25 MG tablet Take 25 mg by mouth every morning  Yes Unknown, Entered By History   topiramate (TOPAMAX) 25 MG tablet Take 50 mg by mouth At Bedtime  Yes Unknown, Entered By History   fexofenadine (ALLEGRA) 180 MG tablet Take 1 tablet (180 mg) by mouth daily  Yes Martina Hong MD   sertraline (ZOLOFT) 25 MG tablet Take 25 mg by mouth daily   Yes Reported, Patient   methotrexate 2.5 MG tablet Take 10 tablets (25 mg) by mouth once a week  Patient taking differently: Take 25 mg by mouth once a week Thursday  Yes Nico Byers MD   atorvastatin (LIPITOR) 40 MG tablet TAKE ONE TABLET BY MOUTH EVERY DAY  Yes Dahiana Flores APRN CNP   pantoprazole (PROTONIX) 40 MG enteric coated tablet TAKE ONE TABLET BY MOUTH ONCE DAILY 30-60 MINUTES BEFORE A MEAL  Yes Georgia Vázquez MD   VITAMIN E PO Take 2,000 Units by mouth daily   Yes Reported, Patient   fluticasone (FLONASE) 50 MCG/ACT nasal spray Spray 2 sprays into both nostrils daily  Patient taking differently: Spray 2 sprays into both nostrils daily as needed   Yes Dahiana Flores APRN CNP   Omega-3 Fatty Acids (OMEGA-3 FISH OIL PO) Take 2,000 mg by mouth daily   Yes  Reported, Patient   folic acid (FOLVITE) 1 MG tablet Take 1 tablet (1 mg) by mouth daily  Yes Nico Byers MD   multivitamin, therapeutic with minerals (MULTI-VITAMIN) TABS Take 1 tablet by mouth daily  Yes Georgia Vázquez MD   magnesium oxide (MAG-OX) 400 (241.3 MG) MG tablet Take 1 tablet (400 mg) by mouth daily  Yes Georgia Vázquez MD   propylene glycol (SYSTANE BALANCE) 0.6 % SOLN ophthalmic solution Place 1 drop into both eyes 4 times daily  Yes Georgia Vázquez MD   tofacitinib (XELJANZ XR) 11 MG XR tablet Take 1 tablet (11 mg) by mouth daily  Yes Nico Byers MD   ferrous gluconate (FERGON) 324 (38 FE) MG tablet Take 1 tablet (324 mg) by mouth 3 times daily (with meals)  Yes Georgia Vázquez MD   cyanocobalamin 500 MCG TABS Take 1 tablet by mouth 2 times daily  Yes Reported, Patient   Calcium Citrate-Vitamin D (CALCIUM CITRATE + PO) Take 2 tablets by mouth daily  Yes Reported, Patient   Cholecalciferol (VITAMIN D-3 PO) Take 1,000 Units by mouth 2 times daily  Yes Reported, Patient   Ascorbic Acid 1000 MG TABS Take 1 tablet by mouth 3 times daily  Yes Reported, Patient   acetaminophen (TYLENOL) 500 MG tablet Take 2 tablets (1,000 mg) by mouth every 8 hours as needed for pain  Yes Georgia Vázquez MD   vitamin  B complex with vitamin C (VITAMIN  B COMPLEX) TABS Take 1 tablet by mouth daily.  Yes Georgia Vázquez MD   Calcium Carbonate Antacid (TUMS PO) Take by mouth as needed Reported on 2/23/2017  Yes Reported, Patient   aspirin 81 MG tablet Take 1 tablet by mouth daily.  Yes Reported, Patient   oxyCODONE (ROXICODONE) 5 MG IR tablet Take 1 tablet (5 mg) by mouth every 6 hours as needed for pain maximum 4 tablet(s) per day   Dahiana Flores APRN CNP   order for [facility] Respironics REMSTAR 60 Series Auto CPAP 12-15 cm H2O, Wisp nasal mask w/a s/m cushion   Anton Louie MD

## 2017-04-03 ENCOUNTER — APPOINTMENT (OUTPATIENT)
Dept: GENERAL RADIOLOGY | Facility: CLINIC | Age: 77
DRG: 460 | End: 2017-04-03
Attending: NEUROLOGICAL SURGERY
Payer: COMMERCIAL

## 2017-04-03 ENCOUNTER — SURGERY (OUTPATIENT)
Age: 77
End: 2017-04-03

## 2017-04-03 ENCOUNTER — ANESTHESIA (OUTPATIENT)
Dept: SURGERY | Facility: CLINIC | Age: 77
DRG: 460 | End: 2017-04-03
Payer: COMMERCIAL

## 2017-04-03 ENCOUNTER — ANESTHESIA EVENT (OUTPATIENT)
Dept: SURGERY | Facility: CLINIC | Age: 77
DRG: 460 | End: 2017-04-03
Payer: COMMERCIAL

## 2017-04-03 ENCOUNTER — HOSPITAL ENCOUNTER (INPATIENT)
Facility: CLINIC | Age: 77
LOS: 2 days | Discharge: HOME | DRG: 460 | End: 2017-04-05
Attending: NEUROLOGICAL SURGERY | Admitting: NEUROLOGICAL SURGERY
Payer: COMMERCIAL

## 2017-04-03 DIAGNOSIS — Z98.1 S/P LUMBAR FUSION: Primary | ICD-10-CM

## 2017-04-03 PROCEDURE — 27810322 ZZHC OR SPINE - CAGE/SPACER/DISK/CORD/CONNECTOR OPNP: Performed by: NEUROLOGICAL SURGERY

## 2017-04-03 PROCEDURE — 22633 ARTHRD CMBN 1NTRSPC LUMBAR: CPT | Performed by: NEUROLOGICAL SURGERY

## 2017-04-03 PROCEDURE — 25000132 ZZH RX MED GY IP 250 OP 250 PS 637: Performed by: NEUROLOGICAL SURGERY

## 2017-04-03 PROCEDURE — 25000128 H RX IP 250 OP 636: Performed by: NEUROLOGICAL SURGERY

## 2017-04-03 PROCEDURE — 25000128 H RX IP 250 OP 636: Performed by: NURSE ANESTHETIST, CERTIFIED REGISTERED

## 2017-04-03 PROCEDURE — 25000300 ZZH OR RX SURGIFLO HEMOSTATIC MATRIX 10ML 199102S OPNP: Performed by: NEUROLOGICAL SURGERY

## 2017-04-03 PROCEDURE — 27210995 ZZH RX 272: Performed by: NEUROLOGICAL SURGERY

## 2017-04-03 PROCEDURE — 40000940 XR LUMBAR SPINE PORT 1 VW: Mod: TC

## 2017-04-03 PROCEDURE — 25000125 ZZHC RX 250: Performed by: NEUROLOGICAL SURGERY

## 2017-04-03 PROCEDURE — 25800025 ZZH RX 258: Performed by: ANESTHESIOLOGY

## 2017-04-03 PROCEDURE — 0ST20ZZ RESECTION OF LUMBAR VERTEBRAL DISC, OPEN APPROACH: ICD-10-PCS | Performed by: NEUROLOGICAL SURGERY

## 2017-04-03 PROCEDURE — 0QS004Z REPOSITION LUMBAR VERTEBRA WITH INTERNAL FIXATION DEVICE, OPEN APPROACH: ICD-10-PCS | Performed by: NEUROLOGICAL SURGERY

## 2017-04-03 PROCEDURE — 12000007 ZZH R&B INTERMEDIATE

## 2017-04-03 PROCEDURE — 36000069 ZZH SURGERY LEVEL 5 EA 15 ADDTL MIN: Performed by: NEUROLOGICAL SURGERY

## 2017-04-03 PROCEDURE — 71000012 ZZH RECOVERY PHASE 1 LEVEL 1 FIRST HR: Performed by: NEUROLOGICAL SURGERY

## 2017-04-03 PROCEDURE — 37000008 ZZH ANESTHESIA TECHNICAL FEE, 1ST 30 MIN: Performed by: NEUROLOGICAL SURGERY

## 2017-04-03 PROCEDURE — 40000306 ZZH STATISTIC PRE PROC ASSESS II: Performed by: NEUROLOGICAL SURGERY

## 2017-04-03 PROCEDURE — 25000128 H RX IP 250 OP 636: Performed by: ANESTHESIOLOGY

## 2017-04-03 PROCEDURE — 37000009 ZZH ANESTHESIA TECHNICAL FEE, EACH ADDTL 15 MIN: Performed by: NEUROLOGICAL SURGERY

## 2017-04-03 PROCEDURE — C1762 CONN TISS, HUMAN(INC FASCIA): HCPCS | Performed by: NEUROLOGICAL SURGERY

## 2017-04-03 PROCEDURE — 0SG00AJ FUSION OF LUMBAR VERTEBRAL JOINT WITH INTERBODY FUSION DEVICE, POSTERIOR APPROACH, ANTERIOR COLUMN, OPEN APPROACH: ICD-10-PCS | Performed by: NEUROLOGICAL SURGERY

## 2017-04-03 PROCEDURE — 40000278 XR SURGERY CARM FLUORO LESS THAN 5 MIN: Mod: TC

## 2017-04-03 PROCEDURE — 25000125 ZZHC RX 250: Performed by: ANESTHESIOLOGY

## 2017-04-03 PROCEDURE — C1713 ANCHOR/SCREW BN/BN,TIS/BN: HCPCS | Performed by: NEUROLOGICAL SURGERY

## 2017-04-03 PROCEDURE — 22214 INCIS 1 VERTEBRAL SEG LUMBAR: CPT | Performed by: NEUROLOGICAL SURGERY

## 2017-04-03 PROCEDURE — 25000566 ZZH SEVOFLURANE, EA 15 MIN: Performed by: NEUROLOGICAL SURGERY

## 2017-04-03 PROCEDURE — 27211024 ZZHC OR SUPPLY OTHER OPNP: Performed by: NEUROLOGICAL SURGERY

## 2017-04-03 PROCEDURE — 36000071 ZZH SURGERY LEVEL 5 W FLUORO 1ST 30 MIN: Performed by: NEUROLOGICAL SURGERY

## 2017-04-03 PROCEDURE — 25000125 ZZHC RX 250

## 2017-04-03 PROCEDURE — 27210794 ZZH OR GENERAL SUPPLY STERILE: Performed by: NEUROLOGICAL SURGERY

## 2017-04-03 PROCEDURE — 25800025 ZZH RX 258: Performed by: NEUROLOGICAL SURGERY

## 2017-04-03 PROCEDURE — 25000125 ZZHC RX 250: Performed by: NURSE ANESTHETIST, CERTIFIED REGISTERED

## 2017-04-03 PROCEDURE — 22840 INSERT SPINE FIXATION DEVICE: CPT | Performed by: NEUROLOGICAL SURGERY

## 2017-04-03 PROCEDURE — 0SG0071 FUSION OF LUMBAR VERTEBRAL JOINT WITH AUTOLOGOUS TISSUE SUBSTITUTE, POSTERIOR APPROACH, POSTERIOR COLUMN, OPEN APPROACH: ICD-10-PCS | Performed by: NEUROLOGICAL SURGERY

## 2017-04-03 PROCEDURE — 22853 INSJ BIOMECHANICAL DEVICE: CPT | Performed by: NEUROLOGICAL SURGERY

## 2017-04-03 PROCEDURE — 8E0WXBF COMPUTER ASSISTED PROCEDURE OF TRUNK REGION, WITH FLUOROSCOPY: ICD-10-PCS | Performed by: NEUROLOGICAL SURGERY

## 2017-04-03 DEVICE — IMP SCR SET MEDT SOLERA BREAK OFF 5.5MM TI 5540030: Type: IMPLANTABLE DEVICE | Site: SPINE LUMBAR | Status: FUNCTIONAL

## 2017-04-03 DEVICE — GRAFT BONE MAGNIFUSE 1CMX5CM 7509215: Type: IMPLANTABLE DEVICE | Site: SPINE LUMBAR | Status: FUNCTIONAL

## 2017-04-03 DEVICE — IMP ROD MEDT SOLERA CVD 5.5X40MM TI 1553201040: Type: IMPLANTABLE DEVICE | Site: SPINE LUMBAR | Status: FUNCTIONAL

## 2017-04-03 DEVICE — IMPLANTABLE DEVICE: Type: IMPLANTABLE DEVICE | Site: SPINE LUMBAR | Status: FUNCTIONAL

## 2017-04-03 DEVICE — IMP SCR MEDT 5.5/6.0MM SOLERA 6.5X50MM HA MA 55740106550: Type: IMPLANTABLE DEVICE | Site: SPINE LUMBAR | Status: FUNCTIONAL

## 2017-04-03 DEVICE — IMP SCR MEDT 5.5/6.0MM SOLERA 6.5X45MM HA MA 55740106545: Type: IMPLANTABLE DEVICE | Site: SPINE LUMBAR | Status: FUNCTIONAL

## 2017-04-03 RX ORDER — ONDANSETRON 2 MG/ML
INJECTION INTRAMUSCULAR; INTRAVENOUS PRN
Status: DISCONTINUED | OUTPATIENT
Start: 2017-04-03 | End: 2017-04-03

## 2017-04-03 RX ORDER — AMOXICILLIN 250 MG
1-2 CAPSULE ORAL 2 TIMES DAILY
Status: DISCONTINUED | OUTPATIENT
Start: 2017-04-03 | End: 2017-04-05 | Stop reason: HOSPADM

## 2017-04-03 RX ORDER — METOCLOPRAMIDE HYDROCHLORIDE 5 MG/ML
5 INJECTION INTRAMUSCULAR; INTRAVENOUS EVERY 6 HOURS PRN
Status: DISCONTINUED | OUTPATIENT
Start: 2017-04-03 | End: 2017-04-03 | Stop reason: HOSPADM

## 2017-04-03 RX ORDER — OXYCODONE HYDROCHLORIDE 5 MG/1
5-10 TABLET ORAL EVERY 4 HOURS PRN
Status: DISCONTINUED | OUTPATIENT
Start: 2017-04-03 | End: 2017-04-05 | Stop reason: HOSPADM

## 2017-04-03 RX ORDER — BUPIVACAINE HYDROCHLORIDE AND EPINEPHRINE 5; 5 MG/ML; UG/ML
INJECTION, SOLUTION EPIDURAL; INTRACAUDAL; PERINEURAL PRN
Status: DISCONTINUED | OUTPATIENT
Start: 2017-04-03 | End: 2017-04-03 | Stop reason: HOSPADM

## 2017-04-03 RX ORDER — CYCLOBENZAPRINE HCL 5 MG
5 TABLET ORAL 3 TIMES DAILY PRN
Status: DISCONTINUED | OUTPATIENT
Start: 2017-04-03 | End: 2017-04-05 | Stop reason: HOSPADM

## 2017-04-03 RX ORDER — ACETAMINOPHEN 325 MG/1
650 TABLET ORAL EVERY 4 HOURS PRN
Status: DISCONTINUED | OUTPATIENT
Start: 2017-04-06 | End: 2017-04-05 | Stop reason: HOSPADM

## 2017-04-03 RX ORDER — MULTIPLE VITAMINS W/ MINERALS TAB 9MG-400MCG
1 TAB ORAL DAILY
Status: DISCONTINUED | OUTPATIENT
Start: 2017-04-04 | End: 2017-04-05 | Stop reason: HOSPADM

## 2017-04-03 RX ORDER — FENTANYL CITRATE 50 UG/ML
INJECTION, SOLUTION INTRAMUSCULAR; INTRAVENOUS PRN
Status: DISCONTINUED | OUTPATIENT
Start: 2017-04-03 | End: 2017-04-03

## 2017-04-03 RX ORDER — DIPHENHYDRAMINE HYDROCHLORIDE 50 MG/ML
12.5 INJECTION INTRAMUSCULAR; INTRAVENOUS EVERY 6 HOURS PRN
Status: DISCONTINUED | OUTPATIENT
Start: 2017-04-03 | End: 2017-04-05 | Stop reason: HOSPADM

## 2017-04-03 RX ORDER — DROPERIDOL 2.5 MG/ML
0.62 INJECTION, SOLUTION INTRAMUSCULAR; INTRAVENOUS
Status: DISCONTINUED | OUTPATIENT
Start: 2017-04-03 | End: 2017-04-03 | Stop reason: CLARIF

## 2017-04-03 RX ORDER — METOCLOPRAMIDE 5 MG/1
5 TABLET ORAL EVERY 6 HOURS PRN
Status: DISCONTINUED | OUTPATIENT
Start: 2017-04-03 | End: 2017-04-05 | Stop reason: HOSPADM

## 2017-04-03 RX ORDER — PANTOPRAZOLE SODIUM 40 MG/1
40 TABLET, DELAYED RELEASE ORAL
Status: DISCONTINUED | OUTPATIENT
Start: 2017-04-03 | End: 2017-04-05 | Stop reason: HOSPADM

## 2017-04-03 RX ORDER — HYDROMORPHONE HYDROCHLORIDE 1 MG/ML
.3-.5 INJECTION, SOLUTION INTRAMUSCULAR; INTRAVENOUS; SUBCUTANEOUS
Status: DISCONTINUED | OUTPATIENT
Start: 2017-04-03 | End: 2017-04-05 | Stop reason: HOSPADM

## 2017-04-03 RX ORDER — LIDOCAINE 40 MG/G
CREAM TOPICAL
Status: DISCONTINUED | OUTPATIENT
Start: 2017-04-03 | End: 2017-04-05 | Stop reason: HOSPADM

## 2017-04-03 RX ORDER — LIDOCAINE 40 MG/G
CREAM TOPICAL
Status: DISCONTINUED | OUTPATIENT
Start: 2017-04-03 | End: 2017-04-03 | Stop reason: HOSPADM

## 2017-04-03 RX ORDER — CEFAZOLIN SODIUM 2 G/100ML
2 INJECTION, SOLUTION INTRAVENOUS
Status: COMPLETED | OUTPATIENT
Start: 2017-04-03 | End: 2017-04-03

## 2017-04-03 RX ORDER — HYDRALAZINE HYDROCHLORIDE 20 MG/ML
2.5-5 INJECTION INTRAMUSCULAR; INTRAVENOUS EVERY 10 MIN PRN
Status: DISCONTINUED | OUTPATIENT
Start: 2017-04-03 | End: 2017-04-03 | Stop reason: HOSPADM

## 2017-04-03 RX ORDER — TOPIRAMATE 50 MG/1
50 TABLET, FILM COATED ORAL AT BEDTIME
Status: DISCONTINUED | OUTPATIENT
Start: 2017-04-03 | End: 2017-04-05 | Stop reason: HOSPADM

## 2017-04-03 RX ORDER — MAGNESIUM OXIDE 400 MG/1
400 TABLET ORAL DAILY
Status: DISCONTINUED | OUTPATIENT
Start: 2017-04-04 | End: 2017-04-05 | Stop reason: HOSPADM

## 2017-04-03 RX ORDER — LIDOCAINE HYDROCHLORIDE 10 MG/ML
INJECTION, SOLUTION INFILTRATION; PERINEURAL PRN
Status: DISCONTINUED | OUTPATIENT
Start: 2017-04-03 | End: 2017-04-03

## 2017-04-03 RX ORDER — PROPOFOL 10 MG/ML
INJECTION, EMULSION INTRAVENOUS PRN
Status: DISCONTINUED | OUTPATIENT
Start: 2017-04-03 | End: 2017-04-03

## 2017-04-03 RX ORDER — SODIUM CHLORIDE AND POTASSIUM CHLORIDE 150; 900 MG/100ML; MG/100ML
INJECTION, SOLUTION INTRAVENOUS CONTINUOUS
Status: DISCONTINUED | OUTPATIENT
Start: 2017-04-03 | End: 2017-04-04

## 2017-04-03 RX ORDER — CETIRIZINE HYDROCHLORIDE 10 MG/1
10 TABLET ORAL DAILY
COMMUNITY
End: 2018-06-22

## 2017-04-03 RX ORDER — FLUTICASONE PROPIONATE 50 MCG
2 SPRAY, SUSPENSION (ML) NASAL DAILY
Status: DISCONTINUED | OUTPATIENT
Start: 2017-04-04 | End: 2017-04-05 | Stop reason: HOSPADM

## 2017-04-03 RX ORDER — METOCLOPRAMIDE 5 MG/1
5 TABLET ORAL EVERY 6 HOURS PRN
Status: DISCONTINUED | OUTPATIENT
Start: 2017-04-03 | End: 2017-04-03 | Stop reason: HOSPADM

## 2017-04-03 RX ORDER — EPHEDRINE SULFATE 50 MG/ML
INJECTION, SOLUTION INTRAMUSCULAR; INTRAVENOUS; SUBCUTANEOUS PRN
Status: DISCONTINUED | OUTPATIENT
Start: 2017-04-03 | End: 2017-04-03

## 2017-04-03 RX ORDER — TOPIRAMATE 25 MG/1
25 TABLET, FILM COATED ORAL EVERY MORNING
Status: DISCONTINUED | OUTPATIENT
Start: 2017-04-04 | End: 2017-04-05 | Stop reason: HOSPADM

## 2017-04-03 RX ORDER — HYDROMORPHONE HYDROCHLORIDE 2 MG/1
2-4 TABLET ORAL
Status: DISCONTINUED | OUTPATIENT
Start: 2017-04-03 | End: 2017-04-05 | Stop reason: HOSPADM

## 2017-04-03 RX ORDER — ACETAMINOPHEN 325 MG/1
975 TABLET ORAL EVERY 8 HOURS
Status: DISCONTINUED | OUTPATIENT
Start: 2017-04-03 | End: 2017-04-05 | Stop reason: HOSPADM

## 2017-04-03 RX ORDER — PROMETHAZINE HYDROCHLORIDE 25 MG/ML
12.5 INJECTION, SOLUTION INTRAMUSCULAR; INTRAVENOUS
Status: DISCONTINUED | OUTPATIENT
Start: 2017-04-03 | End: 2017-04-03 | Stop reason: HOSPADM

## 2017-04-03 RX ORDER — ATORVASTATIN CALCIUM 40 MG/1
40 TABLET, FILM COATED ORAL EVERY EVENING
Status: DISCONTINUED | OUTPATIENT
Start: 2017-04-03 | End: 2017-04-05 | Stop reason: HOSPADM

## 2017-04-03 RX ORDER — ACETAMINOPHEN 650 MG/1
650 SUPPOSITORY RECTAL EVERY 4 HOURS PRN
Status: DISCONTINUED | OUTPATIENT
Start: 2017-04-03 | End: 2017-04-03 | Stop reason: HOSPADM

## 2017-04-03 RX ORDER — ONDANSETRON 4 MG/1
4 TABLET, ORALLY DISINTEGRATING ORAL EVERY 30 MIN PRN
Status: DISCONTINUED | OUTPATIENT
Start: 2017-04-03 | End: 2017-04-03 | Stop reason: HOSPADM

## 2017-04-03 RX ORDER — SODIUM CHLORIDE, SODIUM LACTATE, POTASSIUM CHLORIDE, CALCIUM CHLORIDE 600; 310; 30; 20 MG/100ML; MG/100ML; MG/100ML; MG/100ML
INJECTION, SOLUTION INTRAVENOUS CONTINUOUS
Status: DISCONTINUED | OUTPATIENT
Start: 2017-04-03 | End: 2017-04-03 | Stop reason: HOSPADM

## 2017-04-03 RX ORDER — CETIRIZINE HYDROCHLORIDE 10 MG/1
10 TABLET ORAL DAILY
Status: DISCONTINUED | OUTPATIENT
Start: 2017-04-04 | End: 2017-04-05 | Stop reason: HOSPADM

## 2017-04-03 RX ORDER — DIPHENHYDRAMINE HCL 12.5MG/5ML
12.5 LIQUID (ML) ORAL EVERY 6 HOURS PRN
Status: DISCONTINUED | OUTPATIENT
Start: 2017-04-03 | End: 2017-04-05 | Stop reason: HOSPADM

## 2017-04-03 RX ORDER — DEXAMETHASONE SODIUM PHOSPHATE 4 MG/ML
4 INJECTION, SOLUTION INTRA-ARTICULAR; INTRALESIONAL; INTRAMUSCULAR; INTRAVENOUS; SOFT TISSUE EVERY 10 MIN PRN
Status: DISCONTINUED | OUTPATIENT
Start: 2017-04-03 | End: 2017-04-03 | Stop reason: HOSPADM

## 2017-04-03 RX ORDER — SERTRALINE HYDROCHLORIDE 25 MG/1
25 TABLET, FILM COATED ORAL DAILY
Status: DISCONTINUED | OUTPATIENT
Start: 2017-04-04 | End: 2017-04-05 | Stop reason: HOSPADM

## 2017-04-03 RX ORDER — FENTANYL CITRATE 50 UG/ML
25-50 INJECTION, SOLUTION INTRAMUSCULAR; INTRAVENOUS
Status: DISCONTINUED | OUTPATIENT
Start: 2017-04-03 | End: 2017-04-03 | Stop reason: HOSPADM

## 2017-04-03 RX ORDER — PROCHLORPERAZINE MALEATE 5 MG
5 TABLET ORAL EVERY 6 HOURS PRN
Status: DISCONTINUED | OUTPATIENT
Start: 2017-04-03 | End: 2017-04-05 | Stop reason: HOSPADM

## 2017-04-03 RX ORDER — CEFAZOLIN SODIUM 2 G/100ML
2 INJECTION, SOLUTION INTRAVENOUS EVERY 8 HOURS
Status: DISCONTINUED | OUTPATIENT
Start: 2017-04-03 | End: 2017-04-05

## 2017-04-03 RX ORDER — ONDANSETRON 2 MG/ML
4 INJECTION INTRAMUSCULAR; INTRAVENOUS EVERY 6 HOURS PRN
Status: DISCONTINUED | OUTPATIENT
Start: 2017-04-03 | End: 2017-04-05 | Stop reason: HOSPADM

## 2017-04-03 RX ORDER — ONDANSETRON 2 MG/ML
4 INJECTION INTRAMUSCULAR; INTRAVENOUS EVERY 30 MIN PRN
Status: DISCONTINUED | OUTPATIENT
Start: 2017-04-03 | End: 2017-04-03 | Stop reason: HOSPADM

## 2017-04-03 RX ORDER — ACETAMINOPHEN 325 MG/1
975 TABLET ORAL EVERY 8 HOURS
Status: DISCONTINUED | OUTPATIENT
Start: 2017-04-03 | End: 2017-04-03

## 2017-04-03 RX ORDER — METOCLOPRAMIDE HYDROCHLORIDE 5 MG/ML
5 INJECTION INTRAMUSCULAR; INTRAVENOUS EVERY 6 HOURS PRN
Status: DISCONTINUED | OUTPATIENT
Start: 2017-04-03 | End: 2017-04-05 | Stop reason: HOSPADM

## 2017-04-03 RX ORDER — VANCOMYCIN HCL 900 MCG/MG
POWDER (GRAM) MISCELLANEOUS PRN
Status: DISCONTINUED | OUTPATIENT
Start: 2017-04-03 | End: 2017-04-03 | Stop reason: HOSPADM

## 2017-04-03 RX ORDER — DIMENHYDRINATE 50 MG/ML
25 INJECTION, SOLUTION INTRAMUSCULAR; INTRAVENOUS
Status: DISCONTINUED | OUTPATIENT
Start: 2017-04-03 | End: 2017-04-03 | Stop reason: HOSPADM

## 2017-04-03 RX ORDER — NALOXONE HYDROCHLORIDE 0.4 MG/ML
.1-.4 INJECTION, SOLUTION INTRAMUSCULAR; INTRAVENOUS; SUBCUTANEOUS
Status: DISCONTINUED | OUTPATIENT
Start: 2017-04-03 | End: 2017-04-03 | Stop reason: HOSPADM

## 2017-04-03 RX ORDER — MEPERIDINE HYDROCHLORIDE 25 MG/ML
12.5 INJECTION INTRAMUSCULAR; INTRAVENOUS; SUBCUTANEOUS
Status: DISCONTINUED | OUTPATIENT
Start: 2017-04-03 | End: 2017-04-03 | Stop reason: HOSPADM

## 2017-04-03 RX ORDER — NALOXONE HYDROCHLORIDE 0.4 MG/ML
.1-.4 INJECTION, SOLUTION INTRAMUSCULAR; INTRAVENOUS; SUBCUTANEOUS
Status: DISCONTINUED | OUTPATIENT
Start: 2017-04-03 | End: 2017-04-05 | Stop reason: HOSPADM

## 2017-04-03 RX ORDER — DEXAMETHASONE SODIUM PHOSPHATE 4 MG/ML
INJECTION, SOLUTION INTRA-ARTICULAR; INTRALESIONAL; INTRAMUSCULAR; INTRAVENOUS; SOFT TISSUE PRN
Status: DISCONTINUED | OUTPATIENT
Start: 2017-04-03 | End: 2017-04-03

## 2017-04-03 RX ORDER — NEOSTIGMINE METHYLSULFATE 1 MG/ML
VIAL (ML) INJECTION PRN
Status: DISCONTINUED | OUTPATIENT
Start: 2017-04-03 | End: 2017-04-03

## 2017-04-03 RX ORDER — CEFAZOLIN SODIUM 1 G/3ML
1 INJECTION, POWDER, FOR SOLUTION INTRAMUSCULAR; INTRAVENOUS SEE ADMIN INSTRUCTIONS
Status: DISCONTINUED | OUTPATIENT
Start: 2017-04-03 | End: 2017-04-03 | Stop reason: HOSPADM

## 2017-04-03 RX ORDER — ONDANSETRON 4 MG/1
4 TABLET, ORALLY DISINTEGRATING ORAL EVERY 6 HOURS PRN
Status: DISCONTINUED | OUTPATIENT
Start: 2017-04-03 | End: 2017-04-05 | Stop reason: HOSPADM

## 2017-04-03 RX ORDER — GLYCOPYRROLATE 0.2 MG/ML
INJECTION, SOLUTION INTRAMUSCULAR; INTRAVENOUS PRN
Status: DISCONTINUED | OUTPATIENT
Start: 2017-04-03 | End: 2017-04-03

## 2017-04-03 RX ORDER — FERROUS GLUCONATE 324(38)MG
324 TABLET ORAL
Status: DISCONTINUED | OUTPATIENT
Start: 2017-04-03 | End: 2017-04-04

## 2017-04-03 RX ORDER — FOLIC ACID 1 MG/1
1 TABLET ORAL DAILY
Status: DISCONTINUED | OUTPATIENT
Start: 2017-04-04 | End: 2017-04-05 | Stop reason: HOSPADM

## 2017-04-03 RX ADMIN — PHENYLEPHRINE HYDROCHLORIDE 100 MCG: 10 INJECTION, SOLUTION INTRAMUSCULAR; INTRAVENOUS; SUBCUTANEOUS at 10:56

## 2017-04-03 RX ADMIN — MIDAZOLAM HYDROCHLORIDE 1 MG: 1 INJECTION, SOLUTION INTRAMUSCULAR; INTRAVENOUS at 10:34

## 2017-04-03 RX ADMIN — PROPOFOL 160 MG: 10 INJECTION, EMULSION INTRAVENOUS at 10:40

## 2017-04-03 RX ADMIN — HYDROMORPHONE HYDROCHLORIDE 0.5 MG: 1 INJECTION, SOLUTION INTRAMUSCULAR; INTRAVENOUS; SUBCUTANEOUS at 13:13

## 2017-04-03 RX ADMIN — MIDAZOLAM HYDROCHLORIDE 1 MG: 1 INJECTION, SOLUTION INTRAMUSCULAR; INTRAVENOUS at 10:29

## 2017-04-03 RX ADMIN — FENTANYL CITRATE 50 MCG: 50 INJECTION INTRAMUSCULAR; INTRAVENOUS at 13:23

## 2017-04-03 RX ADMIN — DEXAMETHASONE SODIUM PHOSPHATE 4 MG: 4 INJECTION, SOLUTION INTRAMUSCULAR; INTRAVENOUS at 10:40

## 2017-04-03 RX ADMIN — TOPIRAMATE 50 MG: 50 TABLET ORAL at 21:24

## 2017-04-03 RX ADMIN — PROPOFOL 40 MG: 10 INJECTION, EMULSION INTRAVENOUS at 12:28

## 2017-04-03 RX ADMIN — PHENYLEPHRINE HYDROCHLORIDE 100 MCG: 10 INJECTION, SOLUTION INTRAMUSCULAR; INTRAVENOUS; SUBCUTANEOUS at 11:00

## 2017-04-03 RX ADMIN — LIDOCAINE HYDROCHLORIDE 50 MG: 10 INJECTION, SOLUTION INFILTRATION; PERINEURAL at 10:40

## 2017-04-03 RX ADMIN — CEFAZOLIN SODIUM 2 G: 2 INJECTION, SOLUTION INTRAVENOUS at 10:29

## 2017-04-03 RX ADMIN — SENNOSIDES AND DOCUSATE SODIUM 1 TABLET: 8.6; 5 TABLET ORAL at 21:24

## 2017-04-03 RX ADMIN — Medication 5 MG: at 11:58

## 2017-04-03 RX ADMIN — HYDROMORPHONE HYDROCHLORIDE 0.5 MG: 1 INJECTION, SOLUTION INTRAMUSCULAR; INTRAVENOUS; SUBCUTANEOUS at 13:32

## 2017-04-03 RX ADMIN — Medication 1 G: at 12:23

## 2017-04-03 RX ADMIN — FENTANYL CITRATE 50 MCG: 50 INJECTION INTRAMUSCULAR; INTRAVENOUS at 13:13

## 2017-04-03 RX ADMIN — ONDANSETRON 4 MG: 2 INJECTION INTRAMUSCULAR; INTRAVENOUS at 12:19

## 2017-04-03 RX ADMIN — LIDOCAINE HYDROCHLORIDE 50 MG: 10 INJECTION, SOLUTION INFILTRATION; PERINEURAL at 12:50

## 2017-04-03 RX ADMIN — BUPIVACAINE HYDROCHLORIDE AND EPINEPHRINE BITARTRATE 10 ML: 5; .005 INJECTION, SOLUTION EPIDURAL; INTRACAUDAL; PERINEURAL at 12:22

## 2017-04-03 RX ADMIN — GLYCOPYRROLATE 0.2 MG: 0.2 INJECTION, SOLUTION INTRAMUSCULAR; INTRAVENOUS at 10:40

## 2017-04-03 RX ADMIN — FIBRINOGEN HUMAN AND THROMBIN HUMAN 5 ML: KIT at 12:23

## 2017-04-03 RX ADMIN — Medication 3 MG: at 12:31

## 2017-04-03 RX ADMIN — POTASSIUM CHLORIDE AND SODIUM CHLORIDE: 900; 150 INJECTION, SOLUTION INTRAVENOUS at 16:16

## 2017-04-03 RX ADMIN — THROMBIN HUMAN 1 KIT: 2000 POWDER, FOR SOLUTION TOPICAL at 12:23

## 2017-04-03 RX ADMIN — SODIUM CHLORIDE, POTASSIUM CHLORIDE, SODIUM LACTATE AND CALCIUM CHLORIDE: 600; 310; 30; 20 INJECTION, SOLUTION INTRAVENOUS at 11:00

## 2017-04-03 RX ADMIN — GENTAMICIN SULFATE 1000 ML: 40 INJECTION, SOLUTION INTRAMUSCULAR; INTRAVENOUS at 12:23

## 2017-04-03 RX ADMIN — GLYCOPYRROLATE 0.4 MG: 0.2 INJECTION, SOLUTION INTRAMUSCULAR; INTRAVENOUS at 12:31

## 2017-04-03 RX ADMIN — SODIUM CHLORIDE, POTASSIUM CHLORIDE, SODIUM LACTATE AND CALCIUM CHLORIDE: 600; 310; 30; 20 INJECTION, SOLUTION INTRAVENOUS at 10:29

## 2017-04-03 RX ADMIN — ACETAMINOPHEN 975 MG: 325 TABLET, FILM COATED ORAL at 18:13

## 2017-04-03 RX ADMIN — ROCURONIUM BROMIDE 50 MG: 10 INJECTION INTRAVENOUS at 10:40

## 2017-04-03 RX ADMIN — HYDROMORPHONE HYDROCHLORIDE: 10 INJECTION, SOLUTION INTRAMUSCULAR; INTRAVENOUS; SUBCUTANEOUS at 13:15

## 2017-04-03 RX ADMIN — FENTANYL CITRATE 150 MCG: 50 INJECTION, SOLUTION INTRAMUSCULAR; INTRAVENOUS at 10:40

## 2017-04-03 RX ADMIN — CEFAZOLIN SODIUM 2 G: 2 INJECTION, SOLUTION INTRAVENOUS at 18:13

## 2017-04-03 RX ADMIN — PHENYLEPHRINE HYDROCHLORIDE 100 MCG: 10 INJECTION, SOLUTION INTRAMUSCULAR; INTRAVENOUS; SUBCUTANEOUS at 11:32

## 2017-04-03 RX ADMIN — PHENYLEPHRINE HYDROCHLORIDE 100 MCG: 10 INJECTION, SOLUTION INTRAMUSCULAR; INTRAVENOUS; SUBCUTANEOUS at 11:15

## 2017-04-03 RX ADMIN — PHENYLEPHRINE HYDROCHLORIDE 100 MCG: 10 INJECTION, SOLUTION INTRAMUSCULAR; INTRAVENOUS; SUBCUTANEOUS at 11:45

## 2017-04-03 RX ADMIN — HYDROMORPHONE HYDROCHLORIDE 1 MG: 1 INJECTION, SOLUTION INTRAMUSCULAR; INTRAVENOUS; SUBCUTANEOUS at 11:00

## 2017-04-03 NOTE — IP AVS SNAPSHOT
MRN:[id number]                      After Visit Summary   4/3/2017    Ginger Marshall    MRN: [id number]           Thank you!     Thank you for choosing [facility] for your care. Our goal is always to provide you with excellent care. Hearing back from our patients is one way we can continue to improve our services. Please take a few minutes to complete the written survey that you may receive in the mail after you visit. If you would like to speak to someone directly about your visit please contact Patient Relations at [phone]. Thank you!          Patient Information     Date Of Birth          1940        About your hospital stay     You were admitted on:  April 3, 2017 You last received care in the:  [facility]    You were discharged on:  April 5, 2017        Reason for your hospital stay       You were in the hospital for a L3-4 transforaminal lumbar interbody fusion                  Who to Call     For medical emergencies, please call 911.  For non-urgent questions about your medical care, please call your primary care provider or clinic, [phone]  For questions related to your surgery, please call your surgery clinic        Attending Provider     Provider Specialty    Anthony Michele MD Neurosurgery       Primary Care Provider Office Phone # Fax #    Georgia Ju Vázquez [phone]       [address] [address]        After Care Instructions     Activity       Your activity upon discharge:Discharge instructions:  No lifting of more than 10 pounds, no bending, no twisting until follow up visit.  Wear brace when out of bed.    Ok to shampoo hair, shower or bathe, but, do not scrub or submerge incision under water until evaluated post-operatively in clinic.    Ok to walk as tolerated, avoid bed rest and prolonged sitting.    No contact sports until after follow up visit  No high impact  activities such as; running/jogging, snowmobile or 4 cunningham riding or any other recreational vehicles.    Do not take NSAIDS (ibuprofen, advil, aleve, naproxen, etc) for pain control.    Do NOT drive while taking narcotic pain medication.    Call the [facility] at [phone] for increasing redness, swelling or pus draining from the incision, increased pain or any other questions and concerns.            Diet       Follow this diet upon discharge: Orders Placed This Encounter      Advance Diet as Tolerated: Regular Diet Adult            Wound care and dressings       Instructions to care for your wound at home:Keep your incision clean and dry at all times.  OK to remove dressing on postop day 2.  OK to shower on postop day 3 and allow water to run over incision, pat dry after shower.  No bathing swimming or submerging in water.  Call immediately or come to ED if any drainage occurs, or you develop new pain, redness, or swelling.                  Follow-up Appointments     Follow-up and recommended labs and tests        Please follow up at the [facility] in 10-14 days for staple removal and in 6 weeks with a lumbar xray.  Please call the clinic at [phone] to schedule your appointment with Angie Angel CNP or Carmen Fierro CNP                  Your next 10 appointments already scheduled     May 05, 2017 11:00 AM CDT   LAB with  LAB   [facility] ([facility])    [address] [place] [id number] [phone]           Patient must bring picture ID.  Patient should be prepared to give a urine specimen  Please do not eat 10-12 hours before your appointment if you are coming in fasting for labs on lipids, cholesterol, or glucose (sugar).  Pregnant women should follow their Care Team instructions. Water with medications is okay. Do not drink coffee or other fluids.   If you have concerns about taking  your medications, please ask at office or if  "scheduling via [employer], send a message by clicking on Secure Messaging, Message Your Care Team.            May 10, 2017 10:15 AM CDT   Nurse Only with [facility]   [facility]    [address]  [place] [address]   [phone]            May 10, 2017 10:40 AM CDT   Return Visit with Nico Byers MD   [facility] ([facility]    [address] [place] [address]  [place] [address]   [phone]            Jun 07, 2017 11:00 AM CDT   Nurse Only with [facility] ANCILL[facility]   [facility] ([facility])    [address]  [place] [address]   [phone]            Sep 25, 2017  1:00 PM CDT   Return Visit with Arlet Gonzalez MD   [facility] ([facility])    [address] [place] [id number] [phone]              Future tests that were ordered for you     X-ray lumbar spine 2-3 views*       FSOC                  Pending Results     No orders found from 4/1/2017 to 4/4/2017.            Statement of Approval     Ordered          04/05/17 0820  I have reviewed and agree with all the recommendations and orders detailed in this document.  EFFECTIVE NOW     Approved and electronically signed by:  Angie Angel APRN CNP             Admission Information     Date & Time Provider Department Dept. Phone    4/3/2017 Anthony Michele MD [facility] [phone]      Your Vitals Were     Blood Pressure Pulse Temperature Respirations Height Weight    98/50 (BP Location: Right arm) 68 98.8  F (37.1  C) (Oral) 16 1.651 m (5' 5\") 86.2 kg (190 lb)    Pulse Oximetry BMI (Body Mass Index)                92% 31.62 kg/m2          [employer] Information     [employer] gives you secure access to your electronic health record. If you see a primary care provider, you can also send messages to your care team and make appointments. If you have questions, please " call your primary care clinic.  If you do not have a primary care provider, please call [phone] and they will assist you.        Care EveryWhere ID     This is your Care EveryWhere ID. This could be used by other organizations to access your [facility] medical records  [id number]           Review of your medicines      CONTINUE these medicines which may have CHANGED, or have new prescriptions. If we are uncertain of the size of tablets/capsules you have at home, strength may be listed as something that might have changed.        Dose / Directions    fluticasone 50 MCG/ACT spray   Commonly known as:  FLONASE   This may have changed:    - when to take this  - reasons to take this   Used for:  Post-nasal drip        Dose:  2 spray   Spray 2 sprays into both nostrils daily   Quantity:  16 g   Refills:  3       methotrexate 2.5 MG tablet   This may have changed:  additional instructions   Used for:  Rheumatoid arthritis involving multiple sites with positive rheumatoid factor (H), High risk medications (not anticoagulants) long-term use        Dose:  25 mg   Take 10 tablets (25 mg) by mouth once a week   Quantity:  120 tablet   Refills:  1       oxyCODONE 5 MG IR tablet   Commonly known as:  ROXICODONE   This may have changed:    - how much to take  - when to take this  - reasons to take this  - additional instructions        Dose:  5-10 mg   Take 1-2 tablets (5-10 mg) by mouth every 4 hours as needed for moderate to severe pain   Quantity:  75 tablet   Refills:  0         CONTINUE these medicines which have NOT CHANGED        Dose / Directions    ascorbic acid 1000 MG Tabs tablet        Dose:  1 tablet   Take 1 tablet by mouth 3 times daily   Refills:  0       aspirin 81 MG tablet        Dose:  1 tablet   Take 1 tablet by mouth daily.   Refills:  0       atorvastatin 40 MG tablet   Commonly known as:  LIPITOR   Used for:  Hyperlipidemia LDL goal <100        TAKE ONE TABLET BY MOUTH EVERY DAY   Quantity:  90 tablet    Refills:  2       CALCIUM CITRATE + PO        Dose:  2 tablet   Take 2 tablets by mouth daily   Refills:  0       cetirizine 10 MG tablet   Commonly known as:  zyrTEC        Dose:  10 mg   Take 10 mg by mouth daily   Refills:  0       cyanocobalamin 500 MCG Tabs        Dose:  1 tablet   Take 1 tablet by mouth 2 times daily   Refills:  0       ferrous gluconate 324 (38 FE) MG tablet   Commonly known as:  FERGON   Used for:  Low iron        Dose:  324 mg   Take 1 tablet (324 mg) by mouth 3 times daily (with meals)   Quantity:  270 tablet   Refills:  1       folic acid 1 MG tablet   Commonly known as:  FOLVITE   Used for:  Rheumatoid arthritis involving multiple sites with positive rheumatoid factor (H), High risk medications (not anticoagulants) long-term use        Dose:  1 mg   Take 1 tablet (1 mg) by mouth daily   Quantity:  100 tablet   Refills:  3       magnesium oxide 400 (241.3 MG) MG tablet   Commonly known as:  MAG-OX        Dose:  400 mg   Take 1 tablet (400 mg) by mouth daily   Refills:  0       Multi-vitamin Tabs tablet        Dose:  1 tablet   Take 1 tablet by mouth daily   Refills:  0       OMEGA-3 FISH OIL PO        Dose:  2000 mg   Take 2,000 mg by mouth daily   Refills:  0       order for [facility]        Respironics REMSTAR 60 Series Auto CPAP 12-15 cm H2O, Wisp nasal mask w/a s/m cushion   Refills:  0       pantoprazole 40 MG EC tablet   Commonly known as:  PROTONIX   Used for:  GERD (gastroesophageal reflux disease)        TAKE ONE TABLET BY MOUTH ONCE DAILY 30-60 MINUTES BEFORE A MEAL   Quantity:  90 tablet   Refills:  1       propylene glycol 0.6 % Soln ophthalmic solution   Commonly known as:  SYSTANE BALANCE        Dose:  1 drop   Place 1 drop into both eyes 4 times daily   Refills:  0       tofacitinib 11 MG 24 hr tablet   Commonly known as:  XELJANZ XR   Used for:  Rheumatoid arthritis involving multiple sites with positive rheumatoid factor (H), High risk medications (not anticoagulants)  long-term use        Dose:  11 mg   Take 1 tablet (11 mg) by mouth daily   Quantity:  90 tablet   Refills:  3       * topiramate 25 MG tablet   Commonly known as:  TOPAMAX        Dose:  25 mg   Take 25 mg by mouth every morning   Refills:  0       * topiramate 25 MG tablet   Commonly known as:  TOPAMAX        Dose:  50 mg   Take 50 mg by mouth At Bedtime   Refills:  0       TYLENOL 500 MG tablet   Generic drug:  acetaminophen        Dose:  1000 mg   Take 2 tablets (1,000 mg) by mouth every 8 hours as needed for pain   Quantity:  100 tablet   Refills:  0       vitamin B complex with vitamin C Tabs tablet        Dose:  1 tablet   Take 1 tablet by mouth daily.   Refills:  0       VITAMIN D-3 PO        Dose:  1000 Units   Take 1,000 Units by mouth 2 times daily   Refills:  0       VITAMIN E PO        Dose:  2000 Units   Take 2,000 Units by mouth daily   Refills:  0       ZOLOFT 25 MG tablet   Generic drug:  sertraline        Dose:  25 mg   Take 25 mg by mouth daily   Refills:  0       * Notice:  This list has 2 medication(s) that are the same as other medications prescribed for you. Read the directions carefully, and ask your doctor or other care provider to review them with you.         Where to get your medicines      Some of these will need a paper prescription and others can be bought over the counter. Ask your nurse if you have questions.     Bring a paper prescription for each of these medications     oxyCODONE 5 MG IR tablet                Protect others around you: Learn how to safely use, store and throw away your medicines at www.disposemymeds.org.             Medication List: This is a list of all your medications and when to take them. Check marks below indicate your daily home schedule. Keep this list as a reference.      Medications           Morning Afternoon Evening Bedtime As Needed    ascorbic acid 1000 MG Tabs tablet   Take 1 tablet by mouth 3 times daily                                          aspirin 81 MG tablet   Take 1 tablet by mouth daily.                                atorvastatin 40 MG tablet   Commonly known as:  LIPITOR   TAKE ONE TABLET BY MOUTH EVERY DAY   Last time this was given:  40 mg on 4/5/2017  8:14 AM   Next Dose Due:  4/7                                CALCIUM CITRATE + PO   Take 2 tablets by mouth daily   Next Dose Due:  Resume                                     cetirizine 10 MG tablet   Commonly known as:  zyrTEC   Take 10 mg by mouth daily   Last time this was given:  10 mg on 4/5/2017  8:15 AM   Next Dose Due:  Thursday                                  cyanocobalamin 500 MCG Tabs   Take 1 tablet by mouth 2 times daily   Next Dose Due:  Resume                                        ferrous gluconate 324 (38 FE) MG tablet   Commonly known as:  FERGON   Take 1 tablet (324 mg) by mouth 3 times daily (with meals)   Last time this was given:  324 mg on 4/5/2017  8:14 AM                                         fluticasone 50 MCG/ACT spray   Commonly known as:  FLONASE   Spray 2 sprays into both nostrils daily                                   folic acid 1 MG tablet   Commonly known as:  FOLVITE   Take 1 tablet (1 mg) by mouth daily   Last time this was given:  1 mg on 4/5/2017  8:15 AM                                   magnesium oxide 400 (241.3 MG) MG tablet   Commonly known as:  MAG-OX   Take 1 tablet (400 mg) by mouth daily   Last time this was given:  400 mg on 4/5/2017  8:14 AM                                   methotrexate 2.5 MG tablet   Take 10 tablets (25 mg) by mouth once a week                                Multi-vitamin Tabs tablet   Take 1 tablet by mouth daily   Last time this was given:  1 tablet on 4/5/2017  8:14 AM                                   OMEGA-3 FISH OIL PO   Take 2,000 mg by mouth daily                                   order [place] [facility]   Respironics REMSTAR 60 Series Auto CPAP 12-15 cm H2O, Wisp nasal mask w/a s/m cushion                                 oxyCODONE 5 MG IR tablet   Commonly known as:  ROXICODONE   Take 1-2 tablets (5-10 mg) by mouth every 4 hours as needed for moderate to severe pain   Last time this was given:  10 mg on 4/5/2017 10:24 AM                                   pantoprazole 40 MG EC tablet   Commonly known as:  PROTONIX   TAKE ONE TABLET BY MOUTH ONCE DAILY 30-60 MINUTES BEFORE A MEAL   Last time this was given:  40 mg on 4/5/2017  8:15 AM                                   propylene glycol 0.6 % Soln ophthalmic solution   Commonly known as:  SYSTANE BALANCE   Place 1 drop into both eyes 4 times daily                                            tofacitinib 11 MG 24 hr tablet   Commonly known as:  XELJANZ XR   Take 1 tablet (11 mg) by mouth daily                                   * topiramate 25 MG tablet   Commonly known as:  TOPAMAX   Take 25 mg by mouth every morning   Last time this was given:  25 mg on 4/5/2017  8:14 AM   Next Dose Due:  Thursday                                     * topiramate 25 MG tablet   Commonly known as:  TOPAMAX   Take 50 mg by mouth At Bedtime   Last time this was given:  25 mg on 4/5/2017  8:14 AM   Next Dose Due:  Bedtime                                     TYLENOL 500 MG tablet   Take 2 tablets (1,000 mg) by mouth every 8 hours as needed for pain   Last time this was given:  975 mg on 4/5/2017 10:25 AM   Generic drug:  acetaminophen                                   vitamin B complex with vitamin C Tabs tablet   Take 1 tablet by mouth daily.   Next Dose Due:  Resume                                     VITAMIN D-3 PO   Take 1,000 Units by mouth 2 times daily   Next Dose Due:  Resume                                        VITAMIN E PO   Take 2,000 Units by mouth daily   Next Dose Due:  Resume                                     ZOLOFT 25 MG tablet   Take 25 mg by mouth daily   Last time this was given:  25 mg on 4/5/2017  8:15 AM   Generic drug:  sertraline   Next Dose Due:  Thursday                                      * Notice:  This list has 2 medication(s) that are the same as other medications prescribed for you. Read the directions carefully, and ask your doctor or other care provider to review them with you.

## 2017-04-03 NOTE — ANESTHESIA CARE TRANSFER NOTE
Patient: Ginger Marshall    Procedure(s):  L3 [place] Osteotomy with Trans Lumbar Interbody Fusion  - Wound Class: I-Clean    Diagnosis: Stenosis, Spondylolithesis, Instability   Diagnosis Additional Information: No value filed.    Anesthesia Type:   General     Note:  Airway :Face Mask  Patient transferred to:PACU  Comments: Report and signed off to RN in PACU.  Good Resps, skin pink, VSS, O2 via face tent.      Vitals: (Last set prior to Anesthesia Care Transfer)    CRNA VITALS  4/3/2017 1223 - 4/3/2017 1301      4/3/2017             Pulse: 89    SpO2: 98 %    Resp Rate (observed): 17                Electronically Signed By: AKIRA Bella CRNA  April 3, 2017  1:01 PM

## 2017-04-03 NOTE — OP NOTE
OPERATIVE REPORT        PREOPERATIVE DIAGNOSIS:   1.  Previous right L4 hemilaminectomy by Dr. Hughes on 2/9/13  2. L3-4 grade 1 spondylolisthesis with severe stenosis  3. Lumbar degenerative scoliosis  4. Lumbar radiculopathy  5. Neurogenic claudication    POSTOPERATIVE DIAGNOSIS: Same    PROCEDURE: L3 Brewer-Rodriguez osteotomy with L3-4 transforaminal lumbar interbody fusion   1. L3 Brewer-Rodriguez osteotomy with complete facetectomy and interbody decompression with exposure of the L3 and L4 roots  2. L3-4 complete discectomy with arthrodesis and placement of a 8-12 x 23 mm Medtronic Elevate PEEK expandable interbody graft with locally harvested autologous bone placed centrally and anteriorly  3. Placement of Medtronic Solera HA pedicle screws bilaterally from L3 to L4  4. Open reduction and internal fixation of L3-4 spondylolisthesis   5. L3 - L4 posterior lateral fusion with placement of Medtronic Magnifuse and locally harvested autologous bone  5. Use of Stealth and O-arm intraoperative neuronavigation  6. Use of C-arm fluoroscopy and intraoperative microscope    ASSISTANT: Jake Aponte    INDICATION FOR PROCEDURE: The patient is a 76 year old female that presented with the above clinical and radiographic findings. After reviewing the examination and imaging studies, the decision was made to proceed with the above procedure. The patient understood the risks of surgery to be infection, CSF leak, nerve root injury, failure of hardware, the need for recurrent surgery, epidural fibrosis, weakness, coma and death. The patient voiced understanding and wanted to proceed.     DESCRIPTION OF PROCEDURE: The patient was seen in the pre op area and the procedure was discussed with the patient and family once again and all questions were answered. The consent was then signed and the lumbar spine was marked with a marker. The patient was transported to the operating room on a stretcher and received general endotracheal  anesthesia and a Nichols catheter was placed. The patient was placed on the operating table in the prone position on the Saji frame with all pressure points padded. The back was then prepped and draped in a normal sterile fashion and C-arm was used to identify the appropriate level. Local anesthesia was injected along the planned incision with 10 blade scalpel used to make a midline incision with dissection down through the subcutaneous tissue to the fascia. The fascia was opened bilaterally with the Bovie cautery. Subperiosteal dissection was used to expose the lamina facet joint and transverse processes from L3-4. The Versatraca retractor was then placed to retract the paraspinous muscles. The operating microscope was the brought into the field and attention then turned to the decompression where a Brewer-Rodriguez osteotomy was performed with the Midas Fransisco drill, the Kerrison rongeur and the pituitary rongeur which were used to remove the spinous process, the lamina and facet joints. This completely decompressed and exposed the L3 exiting roots bilaterally and the L4 traversing roots bilaterally. Kambin's triangle was exposed from the left side and the disk space was incised with the 11 blade knife after retracting the thecal sac and nerve root medially. The disk was removed with the pituitary rongeur and the endplate karen from size 7-10 mm. The nerves were thoroughly decompressed and a size 8-12 x 23 mm Medtronic Elevate expandable interbody graft was placed at L3-4  with autologous bone placed both anteriorly and centrally inside the graft. The expandable graft was then expanded to the appropriate size. The Stealth and O-arm were then brought in for intraoperative pedicle screw placement and the pedicle screws were placed using the normal technique of a  hole with the Midas Fransisco drill, the gear shift probe to penetrate the pedicle and the 5.5 mm tap to tap the pedicle. The pedicle screws were then  navigated down the pedicles from L3 to L4. The connecting rods were placed and secured from L3-4 with open reduction and internal fixation and the locking caps were secured with the counter torque system.  Next, 5 ml of Evicel were placed over the dura. Copious irrigation was performed with saline. The wound was irrigated once again and the retractors were removed. Decortication of the lateral facet and transverse process was performed from L3-4 and Medtronic Magnifuse and locally harvested autologous bone were placed out laterally for the posterolateral fusion. Next, 2 Saji-Randolph drains were left subfascially. The fascia was closed with 0 Vicryl, the subcutaneous tissue closed with 2 - 0 and 3-0 Vicryl, and the skin closed with staples. The patient was then transported back to the stretcher, extubated, and sent to recovery.     At the end of the case, all counts were correct    No complications    Estimated blood loss 50 ml     IV fluids: See Anesthesia    The patient received Ancef preoperatively and Vancomycin powder was placed in the wound.      Anthony Michele MD, MS, FAANS

## 2017-04-03 NOTE — ANESTHESIA POSTPROCEDURE EVALUATION
Patient: Ginger Marshall    Procedure(s):  L3 Brewer Joe Osteotomy with Trans Lumbar Interbody Fusion  - Wound Class: I-Clean    Diagnosis:Stenosis, Spondylolithesis, Instability   Diagnosis Additional Information: 1. Previous right L4 hemilaminectomy by Dr. Hughes on 2/9/13  2. L3-4 grade 1 spondylolisthesis with severe stenosis  3. Lumbar degenerative scoliosis  4. Lumbar radiculopathy  5. Neurogenic claudication    Anesthesia Type:  General    Note:  Anesthesia Post Evaluation    Patient location during evaluation: PACU  Patient participation: Able to fully participate in evaluation  Level of consciousness: awake  Pain management: adequate  Airway patency: patent  Cardiovascular status: acceptable  Respiratory status: acceptable  Hydration status: acceptable  PONV: controlled     Anesthetic complications: None          Last vitals:  Vitals:    04/03/17 1332 04/03/17 1345 04/03/17 1400   BP:  116/60 119/80   Pulse:      Resp: 13 18 18   Temp:      SpO2: 97% 99% 97%         Electronically Signed By: Stewart Doll MD  April 3, 2017  2:04 PM

## 2017-04-03 NOTE — IP AVS SNAPSHOT
[facility]    [address]    [facility] [address]    Phone:  [phone]    Fax:  [phone]                                       After Visit Summary   4/3/2017    Ginger Marshall    MRN: [id number]           After Visit Summary Signature Page     I have received my discharge instructions, and my questions have been answered. I have discussed any challenges I see with this plan with the nurse or doctor.    ..........................................................................................................................................  Patient/Patient Representative Signature      ..........................................................................................................................................  Patient Representative Print Name and Relationship to Patient    ..................................................               ................................................  Date                                            Time    ..........................................................................................................................................  Reviewed by Signature/Title    ...................................................              ..............................................  Date                                                            Time

## 2017-04-04 ENCOUNTER — APPOINTMENT (OUTPATIENT)
Dept: OCCUPATIONAL THERAPY | Facility: CLINIC | Age: 77
DRG: 460 | End: 2017-04-04
Attending: NEUROLOGICAL SURGERY
Payer: COMMERCIAL

## 2017-04-04 ENCOUNTER — APPOINTMENT (OUTPATIENT)
Dept: PHYSICAL THERAPY | Facility: CLINIC | Age: 77
DRG: 460 | End: 2017-04-04
Attending: NEUROLOGICAL SURGERY
Payer: COMMERCIAL

## 2017-04-04 LAB
ERYTHROCYTE [DISTWIDTH] IN BLOOD BY AUTOMATED COUNT: 14.8 % (ref 10–15)
GLUCOSE BLDC GLUCOMTR-MCNC: 90 MG/DL (ref 70–99)
HCT VFR BLD AUTO: 32.6 % (ref 35–47)
HGB BLD-MCNC: 10.2 G/DL (ref 11.7–15.7)
MCH RBC QN AUTO: 31.2 PG (ref 26.5–33)
MCHC RBC AUTO-ENTMCNC: 31.3 G/DL (ref 31.5–36.5)
MCV RBC AUTO: 100 FL (ref 78–100)
PLATELET # BLD AUTO: 313 10E9/L (ref 150–450)
RBC # BLD AUTO: 3.27 10E12/L (ref 3.8–5.2)
WBC # BLD AUTO: 9 10E9/L (ref 4–11)

## 2017-04-04 PROCEDURE — 12000007 ZZH R&B INTERMEDIATE

## 2017-04-04 PROCEDURE — 25000132 ZZH RX MED GY IP 250 OP 250 PS 637: Performed by: NEUROLOGICAL SURGERY

## 2017-04-04 PROCEDURE — 97535 SELF CARE MNGMENT TRAINING: CPT | Mod: GO

## 2017-04-04 PROCEDURE — 99222 1ST HOSP IP/OBS MODERATE 55: CPT | Performed by: INTERNAL MEDICINE

## 2017-04-04 PROCEDURE — 97530 THERAPEUTIC ACTIVITIES: CPT | Mod: GP

## 2017-04-04 PROCEDURE — 97116 GAIT TRAINING THERAPY: CPT | Mod: GP

## 2017-04-04 PROCEDURE — 36415 COLL VENOUS BLD VENIPUNCTURE: CPT | Performed by: INTERNAL MEDICINE

## 2017-04-04 PROCEDURE — 97161 PT EVAL LOW COMPLEX 20 MIN: CPT | Mod: GP

## 2017-04-04 PROCEDURE — 40000193 ZZH STATISTIC PT WARD VISIT

## 2017-04-04 PROCEDURE — 85027 COMPLETE CBC AUTOMATED: CPT | Performed by: INTERNAL MEDICINE

## 2017-04-04 PROCEDURE — 97165 OT EVAL LOW COMPLEX 30 MIN: CPT | Mod: GO

## 2017-04-04 PROCEDURE — L0625 LO FLEX L1-BELOW L5 PRE OTS: HCPCS

## 2017-04-04 PROCEDURE — 40000133 ZZH STATISTIC OT WARD VISIT

## 2017-04-04 PROCEDURE — 99207 ZZC CONSULT E&M CHANGED TO INITIAL LEVEL: CPT | Performed by: INTERNAL MEDICINE

## 2017-04-04 PROCEDURE — 25000125 ZZHC RX 250: Performed by: NEUROLOGICAL SURGERY

## 2017-04-04 PROCEDURE — 00000146 ZZHCL STATISTIC GLUCOSE BY METER IP

## 2017-04-04 PROCEDURE — 25000132 ZZH RX MED GY IP 250 OP 250 PS 637: Performed by: INTERNAL MEDICINE

## 2017-04-04 PROCEDURE — 25000128 H RX IP 250 OP 636: Performed by: NEUROLOGICAL SURGERY

## 2017-04-04 RX ORDER — FERROUS GLUCONATE 324(38)MG
324 TABLET ORAL 2 TIMES DAILY WITH MEALS
Status: DISCONTINUED | OUTPATIENT
Start: 2017-04-04 | End: 2017-04-05 | Stop reason: HOSPADM

## 2017-04-04 RX ADMIN — ACETAMINOPHEN 975 MG: 325 TABLET, FILM COATED ORAL at 02:17

## 2017-04-04 RX ADMIN — CYCLOBENZAPRINE HYDROCHLORIDE 5 MG: 5 TABLET, FILM COATED ORAL at 05:13

## 2017-04-04 RX ADMIN — CEFAZOLIN SODIUM 2 G: 2 INJECTION, SOLUTION INTRAVENOUS at 09:58

## 2017-04-04 RX ADMIN — CEFAZOLIN SODIUM 2 G: 2 INJECTION, SOLUTION INTRAVENOUS at 02:17

## 2017-04-04 RX ADMIN — CYCLOBENZAPRINE HYDROCHLORIDE 5 MG: 5 TABLET, FILM COATED ORAL at 18:21

## 2017-04-04 RX ADMIN — ACETAMINOPHEN 975 MG: 325 TABLET, FILM COATED ORAL at 09:58

## 2017-04-04 RX ADMIN — OXYCODONE HYDROCHLORIDE 10 MG: 5 TABLET ORAL at 12:57

## 2017-04-04 RX ADMIN — OXYCODONE HYDROCHLORIDE 5 MG: 5 TABLET ORAL at 08:50

## 2017-04-04 RX ADMIN — TOPIRAMATE 50 MG: 50 TABLET ORAL at 21:15

## 2017-04-04 RX ADMIN — TOPIRAMATE 25 MG: 25 TABLET, FILM COATED ORAL at 08:20

## 2017-04-04 RX ADMIN — ACETAMINOPHEN 975 MG: 325 TABLET, FILM COATED ORAL at 18:21

## 2017-04-04 RX ADMIN — CEFAZOLIN SODIUM 2 G: 2 INJECTION, SOLUTION INTRAVENOUS at 18:23

## 2017-04-04 RX ADMIN — SERTRALINE HYDROCHLORIDE 25 MG: 25 TABLET ORAL at 08:20

## 2017-04-04 RX ADMIN — OXYCODONE HYDROCHLORIDE 10 MG: 5 TABLET ORAL at 21:15

## 2017-04-04 RX ADMIN — FOLIC ACID 1 MG: 1 TABLET ORAL at 08:20

## 2017-04-04 RX ADMIN — CETIRIZINE HYDROCHLORIDE 10 MG: 10 TABLET, FILM COATED ORAL at 08:20

## 2017-04-04 RX ADMIN — PANTOPRAZOLE SODIUM 40 MG: 40 TABLET, DELAYED RELEASE ORAL at 06:47

## 2017-04-04 RX ADMIN — SENNOSIDES AND DOCUSATE SODIUM 2 TABLET: 8.6; 5 TABLET ORAL at 21:15

## 2017-04-04 RX ADMIN — ATORVASTATIN CALCIUM 40 MG: 40 TABLET, FILM COATED ORAL at 08:20

## 2017-04-04 RX ADMIN — OXYCODONE HYDROCHLORIDE 10 MG: 5 TABLET ORAL at 17:10

## 2017-04-04 RX ADMIN — POTASSIUM CHLORIDE AND SODIUM CHLORIDE: 900; 150 INJECTION, SOLUTION INTRAVENOUS at 05:58

## 2017-04-04 RX ADMIN — FERROUS GLUCONATE 324 MG: 324 TABLET ORAL at 18:22

## 2017-04-04 RX ADMIN — SENNOSIDES AND DOCUSATE SODIUM 2 TABLET: 8.6; 5 TABLET ORAL at 08:20

## 2017-04-04 ASSESSMENT — ACTIVITIES OF DAILY LIVING (ADL): PREVIOUS_RESPONSIBILITIES: MEAL PREP;SHOPPING;MEDICATION MANAGEMENT;FINANCES;DRIVING;WORK

## 2017-04-04 NOTE — PLAN OF CARE
Problem: Goal Outcome Summary  Goal: Goal Outcome Summary  OT:  Evaluation complete, treatment initiated.  Pt underwent  L3-4 transforaminal lumbar interbody fusion on 4/3/2017.  Pt lives alone in Independent living, has a cleaning lady and neighbor that may be able to assist with tasks upon discharge.  Pt has a small dog and cat at home.     Surgeon Discharge Plan: Home     Current Functional Status: Pt currently Min A with bed mobility, CGA with lower body dressing with AE. Pt has reacher, sock aide and dressing stick at home.  Pt and therapist problem solved issues concerning pet care upon discharge, along with other concerns due to size of apartment.     Barriers to Plan/Home: None anticipated.  However, pt may need to find assistance to care for pets upon discharge.

## 2017-04-04 NOTE — PLAN OF CARE
Problem: Goal Outcome Summary  Goal: Goal Outcome Summary  Outcome: Improving  DAy RN  Vss,. CMS+ pain in lower back 6 with activity, 3 at rest. Used pca then switched to oral oxycodone. 5mg was not enough to control her pain so increased to 10mg. Repositioned q1-2 hours follows spine precautions. Up with pt in am and pm tolerated therapy well and sitting in chair.  Ate and drank well today  Nichols dc today DTV by 5pm  Lungs are clear 02 needed at 2L when sleeping encouraged hourly IS use.   Both Jonel's about 30cc this shift.  Overall doing well.

## 2017-04-04 NOTE — PLAN OF CARE
Problem: Goal Outcome Summary  Goal: Goal Outcome Summary  Outcome: Improving  A/O.  Dressing intact. CMS intact except for chronic numbness to toes.  Has weakness to right leg from previous hip surgery.  Dilaudid PCA for pain.  Nichols patent.  2 SHINE drains.  Tolerated clear liquids well. No nausea.  CPAP at HS, otherwise on 2L via NC.  Dangled and stood at bedside this evening with Ax2 et walker.  Tolerated well.  Will continue to monitor.

## 2017-04-04 NOTE — CONSULTS
[facility]  Hospitalist Consult Note  April 4, 2017  Name: Ginger Marshall    MRN: [id number]  YOB: 1940    Age: 76 year old  Date of admission: 4/3/2017  Primary care provider: Georgia Vázquez      Summary:  Patient is a pleasant 76-year-old female with a significant past medical history for hyperlipidemia, depression, and iron deficiency anemia who we are asked to see by Dr. Michele for postoperative medical management.    Problem list/Plan  1. Postop day 1 L3 to L4 lumbar interbody fusion: We'll defer pain control and DVT prophylaxis to neurosurgery.  2. History of iron deficiency anemia: Patient has required iron transfusions in the past and had been on supplement three times daily up until a week ago when she held it because she was no longer anemic.  We'll check a CBC and restart patient back on iron supplementation.  Doubt that she would need t.i.d. dosing at this point in time and we'll decrease this to b.i.d. but did instruct the patient that she should touch base with her primary care physician before she decides to stop it altogether.  3. History of depression: Stable at this time.  Continue chronic Zoloft.  4. Hyperlipidemia: Resume Lipitor  5. History of rheumatoid arthritis: Patient was previously on Plaquenil but is no longer on this and follows with a rheumatologist.      All lab work and imaging data independently reviewed by myself    Prophylaxis;  DVT prophylaxis per surgery/Ambulation.   Discharge: Per neurosurgery    Requesting physician: Dr. Michele    Reason for consultation: Postoperative medical management    HPI  Patient is a pleasant 76-year-old female with a significant past medical history for hyperlipidemia, depression, and iron deficiency anemia who we are asked to see by Dr. Michele for postoperative medical management.  She is seen postop day #1.  She is in good spirits and her pain is controlled.  She has not been up out of bed but has been  able to sit on the edge of the bed.  She states that she is ready to get up with therapy.  She has no known cardiopulmonary disease and currently denies any chest pain, shortness of breath, nausea, vomiting, or diarrhea.  She is in good spirits and is ready to get up with PT.       Past Medical History:     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     History of total knee replacement 7/23/2009     Menopause late 40's     Other chronic pain     joints     Pelvic fracture (H) 5/13/2014     PUD (peptic ulcer disease)      Rheumatoid arteritis      Sleep apnea     Uses a CPAP     Vitamin B12 deficiency      Past Surgical History:     Past Surgical History:   Procedure Laterality Date     ABDOMEN SURGERY      c-sections     ARTHROSCOPY KNEE RT/LT  01/06    left     BACK SURGERY  2013    disc     BREAST BIOPSY, RT/LT      left benign     BREAST SURGERY  90's    lumpectomy     C ANESTH,TOTAL HIP ARTHROPLASTY  2010    Rt hip     C HAND/FINGER SURGERY UNLISTED  11/05    right hand     C NONSPECIFIC PROCEDURE  91    left foot surgery     C NONSPECIFIC PROCEDURE  95    R MCP surgery     C TOTAL KNEE ARTHROPLASTY  2006    left     C/SECTION, LOW TRANSVERSE  66,72    x 2     CATARACT IOL, RT/LT       COLONOSCOPY  2006,2009     EYE SURGERY      cataracs     HC ESOPH/GAS REFLUX TEST W NASAL IMPED >1 HR  2/1/2012    Procedure:ESOPHAGEAL IMPEDENCE FUNCTION TEST WITH 24 HOUR PH GREATER THAN 1 HOUR; Surgeon:KAYKAY JULIO; Location:[facility]     IR CAUDAL EPIDURAL INJECTION SINGLE  5/2/2012    LESI L5-S1 at [facility]     SURGICAL HISTORY OF -   2007    right knee total replacement     Social History:     Social History   Substance Use Topics     Smoking status: Former Smoker     Packs/day: 1.00     Years: 41.00     Types: Cigarettes     Quit date: 1/1/1999     Smokeless tobacco: Never Used      "Alcohol use 0.0 oz/week      Comment: Periodically.     Family History:  Family history reviewed. NO pertinent family history     Allergies:     Allergies   Allergen Reactions     Abatacept      Severe headaches     Adhesive Tape      Plastic tape     Celebrex [Celecoxib]      Ineffective     Ethanol      Antihistamines     Orencia [Abatacept]      Headache     Septra [Bactrim]      Sulfa Drugs      \"deathly ill\"     Tramadol      Headache     Medications:     Prescriptions Prior to Admission   Medication Sig Dispense Refill Last Dose     cetirizine (ZYRTEC) 10 MG tablet Take 10 mg by mouth daily   4/3/2017 at Unknown time     topiramate (TOPAMAX) 25 MG tablet Take 25 mg by mouth every morning   4/3/2017 at Unknown time     topiramate (TOPAMAX) 25 MG tablet Take 50 mg by mouth At Bedtime   4/2/2017 at Unknown time     oxyCODONE (ROXICODONE) 5 MG IR tablet Take 1 tablet (5 mg) by mouth every 6 hours as needed for pain maximum 4 tablet(s) per day 30 tablet 0 4/2/2017 at Unknown time     sertraline (ZOLOFT) 25 MG tablet Take 25 mg by mouth daily    4/3/2017 at Unknown time     methotrexate 2.5 MG tablet Take 10 tablets (25 mg) by mouth once a week (Patient taking differently: Take 25 mg by mouth once a week Thursday) 120 tablet 1 Past Month at Unknown time     atorvastatin (LIPITOR) 40 MG tablet TAKE ONE TABLET BY MOUTH EVERY DAY 90 tablet 2 4/2/2017 at Unknown time     pantoprazole (PROTONIX) 40 MG enteric coated tablet TAKE ONE TABLET BY MOUTH ONCE DAILY 30-60 MINUTES BEFORE A MEAL 90 tablet 1 4/2/2017 at Unknown time     VITAMIN E PO Take 2,000 Units by mouth daily    Past Week at Unknown time     fluticasone (FLONASE) 50 MCG/ACT nasal spray Spray 2 sprays into both nostrils daily (Patient taking differently: Spray 2 sprays into both nostrils daily as needed ) 16 g 3 4/3/2017 at Unknown time     Omega-3 Fatty Acids (OMEGA-3 FISH OIL PO) Take 2,000 mg by mouth daily    Past Week at Unknown time     folic acid " (FOLVITE) 1 MG tablet Take 1 tablet (1 mg) by mouth daily 100 tablet 3 4/3/2017 at Unknown time     multivitamin, therapeutic with minerals (MULTI-VITAMIN) TABS Take 1 tablet by mouth daily   Past Week at Unknown time     magnesium oxide (MAG-OX) 400 (241.3 MG) MG tablet Take 1 tablet (400 mg) by mouth daily   Past Week at Unknown time     propylene glycol (SYSTANE BALANCE) 0.6 % SOLN ophthalmic solution Place 1 drop into both eyes 4 times daily   4/3/2017 at Unknown time     tofacitinib (XELJANZ XR) 11 MG XR tablet Take 1 tablet (11 mg) by mouth daily 90 tablet 3 Past Week at Unknown time     ferrous gluconate (FERGON) 324 (38 FE) MG tablet Take 1 tablet (324 mg) by mouth 3 times daily (with meals) 270 tablet 1 4/2/2017 at Unknown time     cyanocobalamin 500 MCG TABS Take 1 tablet by mouth 2 times daily   Past Week at Unknown time     Calcium Citrate-Vitamin D (CALCIUM CITRATE + PO) Take 2 tablets by mouth daily   Past Week at Unknown time     Cholecalciferol (VITAMIN D-3 PO) Take 1,000 Units by mouth 2 times daily   Past Week at Unknown time     Ascorbic Acid 1000 MG TABS Take 1 tablet by mouth 3 times daily   Past Week at Unknown time     acetaminophen (TYLENOL) 500 MG tablet Take 2 tablets (1,000 mg) by mouth every 8 hours as needed for pain 100 tablet 0 4/2/2017 at Unknown time     vitamin  B complex with vitamin C (VITAMIN  B COMPLEX) TABS Take 1 tablet by mouth daily.  0 Past Week at Unknown time     aspirin 81 MG tablet Take 1 tablet by mouth daily.   Past Month at Unknown time     order for DME Respironics REMSTAR 60 Series Auto CPAP 12-15 cm H2O, Wisp nasal mask w/a s/m cushion   Taking       Review of Systems:   A Comprehensive greater than 10 system review of systems was carried out.  Pertinent positives and negatives are noted above.  Otherwise negative for contributory information.        Physical Exam:  Blood pressure 100/50, pulse 68, temperature 96.4  F (35.8  C), temperature source Axillary, resp.  "rate 16, height 1.651 m (5' 5\"), weight 86.2 kg (190 lb), SpO2 94 %, not currently breastfeeding.  Gen: Pleasant in no acute distress.  HEENT: NCAT. EOMI. PERRL.  Neck: Normal inspection. No bruit, JVD or thyromegaly.  Lungs: Normal respiratory effort.Clear to auscultation bilaterally with no crackles or wheezes.  Card: N s1s2. RRR. No M/R/G.  Peripheral pulses present and symmetric.   Skin: No rash. Warm to the touch  Extr: No edema. CMS intact  Psychiatric: Patient alert oriented ×3.  Normal affect  Neurologic: Cranial nerves II-XII are intact. Motor strength 5/5    No laboratory data  Imaging:   Recent Results (from the past 24 hour(s))   XR Lumbar Spine Port 1 View    Narrative    This exam was marked as non-reportable because it will not be read by a   radiologist or a [place] non-radiologist provider.             XR Surgery NIESHA L/T 5 Min Fluoro    Narrative    This exam was marked as non-reportable because it will not be read by a   radiologist or a [place] non-radiologist provider.             XR Lumbar Spine Port 1 View    Narrative    LUMBAR SPINE PORTABLE ONE VIEW   4/3/2017 12:22 PM     INDICATION: L3-L4 fusion. Check hardware.    COMPARISON: 4/3/2017.      Impression    IMPRESSION: Single lateral view demonstrates posterior rods and  pedicle screws at L3 and L4, assuming five lumbar-type vertebral  bodies. Disc space prosthesis at the L3-L4 interspace. Degenerative  disc disease at L4-L5. Degenerative changes lower lumbar facet joints.  No significant change in alignment. Vascular calcification. Hip  prostheses is present.       Jakub Ag MD Pager [phone]      "

## 2017-04-04 NOTE — PROGRESS NOTES
[facility]    Neurosurgery Progress Note    Date of Service (when I saw the patient): 04/04/2017     Assessment & Plan   Ginger Marshall is a 76 year old female who was admitted on 4/3/2017. She presented with neurogenic claudication, low back pain and RLE pain. She underwent L3 Brewer-Rodirguez osteotomy with L3-4 transforaminal lumbar interbody fusion with Dr. Anthony Michele on 4/3/2017. Today she is seen sitting up in bed. She reports 5/10 low back incisional pain but currently denies any radicular pain, weakness, numbness or tingling in her BLE. We will have her ambulate with PT/OT today, transition from PCA to oral pain medications and remove Nichols catheter. Her SHINE drains may be removed by the RN when output <30mL in one shift.       Active Problems:    S/P lumbar fusion    Assessment: Stable    Plan: PT/OT and ambulation  Brace when out of bed   Wean off PCA and start oral analgesics  D/C Nichols catheter today   Encourage use of IS and deep breathing   Keep drains in until less than 30 cc's   Suture/Staple removal in 10-14 days  Anticipate d/c home in 1-2 days depending on progress       I have discussed the following assessment and plan with the Dr. Anthony Michele who is in agreement with initial plan and will follow up with further consultation recommendations.    [facility]  [facility]  [address] [address]    Tel [phone]  Pager [phone]      Interval History   Doing well, reports pain well-controlled.     Physical Exam   Temp: 96.4  F (35.8  C) Temp src: Axillary BP: 100/50   Heart Rate: 92 Resp: 18 SpO2: 94 % O2 Device: Nasal cannula Oxygen Delivery: 2 LPM  Vitals:    04/03/17 0737   Weight: 86.2 kg (190 lb)     Vital Signs with Ranges  Temp:  [96  F (35.6  C)-98.6  F (37  C)] 96.4  F (35.8  C)  Heart Rate:  [58-92] 92  Resp:  [9-22] 18  BP: ()/(42-80) 100/50  FiO2 (%):  [100 %] 100 %  SpO2:  [94  "%-100 %] 94 %  I/O last 3 completed shifts:  In: 3634 [P.O.:850; I.V.:2784]  Out: 2625 [Urine:2285; Drains:290; Blood:50]    Heart Rate: 92, Blood pressure 100/50, pulse 68, temperature 96.4  F (35.8  C), temperature source Axillary, resp. rate 18, height 1.651 m (5' 5\"), weight 86.2 kg (190 lb), SpO2 94 %, not currently breastfeeding.  190 lbs 0 oz  HEENT:  Normocephalic, atraumatic.  PERRLA.  EOM s intact.    Neck:  Supple, non-tender, without lymphadenopathy.  Heart:  No peripheral edema  Lungs:  No SOB  Abdomen:  Soft, non-tender, non-distended.   Skin:  Warm and dry, good capillary refill.  Extremities:  Good radial and dorsalis pedis pulses bilaterally, no edema, cyanosis or clubbing.    NEUROLOGICAL EXAMINATION:   Mental status:  Alert and Oriented x 3, speech is fluent.  Cranial nerves:  II-XII intact.   Motor:  Strength is 5/5 throughout the lower extremities  Hip Flexor:                Right: 5/5  Left:  5/5  Hip Adductor:             Right:  5/5  Left:  5/5  Hip Abductor:             Right:  5/5  Left:  5/5  Gastroc Soleus:        Right:  5/5  Left:  5/5  Tib/Ant:                      Right:  5/5  Left:  5/5  EHL:                     Right:  5/5  Left:  5/5  Sensation:  intact  Reflexes:  Negative Babinski.  Negative Clonus.    Gait:  Deferred        Medications     0.9% sodium chloride + KCl 20 mEq/L 25 mL/hr at 04/04/17 0814       ferrous gluconate  324 mg Oral BID w/meals     atorvastatin  40 mg Oral QPM     cetirizine  10 mg Oral Daily     fluticasone  2 spray Both Nostrils Daily     folic acid  1 mg Oral Daily     magnesium oxide  400 mg Oral Daily     multivitamin, therapeutic with minerals  1 tablet Oral Daily     pantoprazole  40 mg Oral BID AC     hypromellose-dextran  1 drop Both Eyes 4x Daily     sertraline  25 mg Oral Daily     topiramate  25 mg Oral QAM     topiramate  50 mg Oral At Bedtime     sodium chloride (PF)  3 mL Intracatheter Q8H     ceFAZolin  2 g Intravenous Q8H     acetaminophen  " 975 mg Oral Q8H     senna-docusate  1-2 tablet Oral BID       Data     All new lab and imaging data was personally reviewed by me.  CBC RESULTS:   Recent Labs   Lab Test  04/04/17   0922   WBC  9.0   RBC  3.27*   HGB  10.2*   HCT  32.6*   MCV  100   MCH  31.2   MCHC  31.3*   RDW  14.8   PLT  313     Basic Metabolic Panel:  Lab Results   Component Value Date     03/27/2017      Lab Results   Component Value Date    POTASSIUM 4.0 03/27/2017     Lab Results   Component Value Date    CHLORIDE 107 03/27/2017     Lab Results   Component Value Date    BRANDEE 9.7 03/27/2017     Lab Results   Component Value Date    CO2 25 03/27/2017     Lab Results   Component Value Date    BUN 11 03/27/2017     Lab Results   Component Value Date    CR 0.62 03/27/2017     Lab Results   Component Value Date    GLC 96 03/27/2017     INR:  Lab Results   Component Value Date    INR 1.08 03/27/2017

## 2017-04-04 NOTE — PROGRESS NOTES
04/04/17 1009   Quick Adds   Type of Visit Initial Occupational Therapy Evaluation   Living Environment   Lives With alone   Living Arrangements independent living facility   Home Accessibility grab bars present (bathtub);grab bars present (toilet)   Number of Stairs to Enter Home 0   Number of Stairs Within Home 0   Transportation Available car;family or friend will provide   Living Environment Comment Pt reports that she thinks her cleaning lady can take her to the store, or there are other services that get her to places/appts.   Self-Care   Dominant Hand right   Usual Activity Tolerance moderate   Current Activity Tolerance fair   Regular Exercise no   Equipment Currently Used at Home raised toilet;walker, rolling;walker, standard   Activity/Exercise/Self-Care Comment Pt reports that she was not very active prior to admission due to pain.  Pt has a dog that she takes out 3x/day.  Pt reports that she thinks her neigbor and cleaning lady will be able to help at times during her recover.   Functional Level Prior   Ambulation 1-->assistive equipment   Transferring 1-->assistive equipment   Toileting 1-->assistive equipment   Bathing 0-->independent   Dressing 0-->independent   Eating 0-->independent   Communication 0-->understands/communicates without difficulty   Swallowing 0-->swallows foods/liquids without difficulty   Cognition 0 - no cognition issues reported   Fall history within last six months no   Which of the above functional risks had a recent onset or change? dressing;transferring;ambulation;bathing;toileting   General Information   Onset of Illness/Injury or Date of Surgery - Date 04/03/17   Referring Physician Anthony Michele MD   Patient/Family Goals Statement to return home and to prior level of function   Additional Occupational Profile Info/Pertinent History of Current Problem Ginger Marshall is a 76 year old female who was admitted on 4/3/2017. She presented with neurogenic claudication,  low back pain and RLE pain. She underwent L3 Brewer-Rodriguez osteotomy with L3-4 transforaminal lumbar interbody fusion with Dr. Anthony Michele on 4/3/2017.   Precautions/Limitations spinal precautions;fall precautions   General Observations Pt presents with decreased I with ADLs, functional mobility and transfers.   Cognitive Status Examination   Orientation orientation to person, place and time   Level of Consciousness alert   Able to Follow Commands WNL/WFL   Personal Safety (Cognitive) WNL/WFL   Memory intact   Attention No deficits were identified   Organization/Problem Solving No deficits were identified   Executive Function No deficits were identified   Cognitive Comment Pt appears at baseline with cognition, continue to monitor.   Visual Perception   Visual Perception No deficits were identified   Sensory Examination   Sensory Quick Adds No deficits were identified   Pain Assessment   Patient Currently in Pain Yes, see Vital Sign flowsheet  (7/10)   Range of Motion (ROM)   ROM Comment wfl   Strength   Strength Comments not formally tested due to post surgical precautions   Mobility   Bed Mobility Comments Min A   Transfer Skills   Transfer Comments CGA   Instrumental Activities of Daily Living (IADL)   Previous Responsibilities meal prep;shopping;medication management;finances;driving;work   Activities of Daily Living Analysis   Impairments Contributing to Impaired Activities of Daily Living pain;post surgical precautions   General Therapy Interventions   Planned Therapy Interventions ADL retraining;bed mobility training;ROM;strengthening;transfer training   Clinical Impression   Criteria for Skilled Therapeutic Interventions Met yes, treatment indicated   OT Diagnosis Pt presents with decreased I with ADLs, functional mobility and transfers.   Influenced by the following impairments post surgical precautions, pain   Assessment of Occupational Performance 1-3 Performance Deficits   Identified Performance  "Deficits dressing, grooming, home management   Clinical Decision Making (Complexity) Low complexity   Therapy Frequency daily   Predicted Duration of Therapy Intervention (days/wks) 3 days   Anticipated Discharge Disposition Home with Assist   Risks and Benefits of Treatment have been explained. Yes   Patient, Family & other staff in agreement with plan of care Yes   [facility] AM-PAC  \"6 Clicks\" Daily Activity Inpatient Short Form   1. Putting on and taking off regular lower body clothing? 3 - A Little   2. Bathing (including washing, rinsing, drying)? 3 - A Little   3. Toileting, which includes using toilet, bedpan or urinal? 2 - A Lot   4. Putting on and taking off regular upper body clothing? 3 - A Little   5. Taking care of personal grooming such as brushing teeth? 4 - None   6. Eating meals? 4 - None   Daily Activity Raw Score (Score out of 24.Lower scores equate to lower levels of function) 19   Total Evaluation Time   Total Evaluation Time (Minutes) 10     "

## 2017-04-04 NOTE — PLAN OF CARE
Problem: Goal Outcome Summary  Goal: Goal Outcome Summary  PT: Patient seen by physical therapy for evaluation and treatment.     Surgeon Discharge Plan: None stated; patient plans to return to senior living apartment, lives alone with cat and dog.     Current Functional Status: Patient educated in spinal precautions.  Transferred to sitting at EOB through log roll.  Transferred to standing with 2WW and CGA. Donned back brace with max A from orthotist and therapist.  Patient amb 10 feet with 2WW and CGA in order to transfer to bedside chair.  Completed transfer to chair with verbal cueing for safe technique.  Up in chair at end of session.     Barriers to Plan/Home: lives alone, cares for dog and cat independently

## 2017-04-04 NOTE — PROGRESS NOTES
"Rx states: M48.06 - Spinal stenosis, lumbar region, G89.29 - Other chronic pain, M54.5 - Low back pain, and Z98.1 - Arthrodesis status  S: Patient was seen at the [facility], for fitting of a corset. Pt. stated that she has had back pain for decades and this is her second spinal surgery.  O: Patient was seen in her room with PT. Pt. was able to stand for the fitting.  A: Fit [employer] corset, size 36-48\" and an extender panel to pt. per Dr. Michele' orders.   P: Pt. will be wearing the corset when she is up and walking. Gave pt. instructions on care and if she has any questions, she is to call us.   G: Support the lower back and alleviate pain while she is healing from her spinal surgery.    "

## 2017-04-04 NOTE — PLAN OF CARE
Problem: Goal Outcome Summary  Goal: Goal Outcome Summary  PT: Patient seen by physical therapy for treatment.     Surgeon Discharge Plan: None stated; patient plans to return to senior living apartment, lives alone with cat and dog.     Current Functional Status: Reviewed spinal precautions.  Transferred to sitting at EOB through log roll with minimal verbal cueing.  Transferred to standing with 2WW and CGA. Donned back brace with mod A from therapist.  Patient amb 200 feet with 2WW and SBA.  Completed transfer to chair with verbal cueing for safe technique.  Up in chair at end of session.     Barriers to Plan/Home: lives alone, cares for dog and cat independently

## 2017-04-05 ENCOUNTER — APPOINTMENT (OUTPATIENT)
Dept: OCCUPATIONAL THERAPY | Facility: CLINIC | Age: 77
DRG: 460 | End: 2017-04-05
Attending: NEUROLOGICAL SURGERY
Payer: COMMERCIAL

## 2017-04-05 ENCOUNTER — APPOINTMENT (OUTPATIENT)
Dept: PHYSICAL THERAPY | Facility: CLINIC | Age: 77
DRG: 460 | End: 2017-04-05
Attending: NEUROLOGICAL SURGERY
Payer: COMMERCIAL

## 2017-04-05 VITALS
WEIGHT: 190 LBS | DIASTOLIC BLOOD PRESSURE: 50 MMHG | HEIGHT: 65 IN | HEART RATE: 68 BPM | RESPIRATION RATE: 16 BRPM | SYSTOLIC BLOOD PRESSURE: 98 MMHG | OXYGEN SATURATION: 92 % | BODY MASS INDEX: 31.65 KG/M2 | TEMPERATURE: 98.8 F

## 2017-04-05 LAB — GLUCOSE BLDC GLUCOMTR-MCNC: 99 MG/DL (ref 70–99)

## 2017-04-05 PROCEDURE — 00000146 ZZHCL STATISTIC GLUCOSE BY METER IP

## 2017-04-05 PROCEDURE — 40000193 ZZH STATISTIC PT WARD VISIT

## 2017-04-05 PROCEDURE — 25000132 ZZH RX MED GY IP 250 OP 250 PS 637: Performed by: NEUROLOGICAL SURGERY

## 2017-04-05 PROCEDURE — 25000128 H RX IP 250 OP 636: Performed by: NEUROLOGICAL SURGERY

## 2017-04-05 PROCEDURE — 97535 SELF CARE MNGMENT TRAINING: CPT | Mod: GO

## 2017-04-05 PROCEDURE — 97116 GAIT TRAINING THERAPY: CPT | Mod: GP

## 2017-04-05 PROCEDURE — 25000132 ZZH RX MED GY IP 250 OP 250 PS 637: Performed by: INTERNAL MEDICINE

## 2017-04-05 PROCEDURE — 40000133 ZZH STATISTIC OT WARD VISIT

## 2017-04-05 PROCEDURE — 97530 THERAPEUTIC ACTIVITIES: CPT | Mod: GP

## 2017-04-05 RX ORDER — OXYCODONE HYDROCHLORIDE 5 MG/1
5-10 TABLET ORAL EVERY 4 HOURS PRN
Qty: 75 TABLET | Refills: 0 | Status: SHIPPED | OUTPATIENT
Start: 2017-04-05 | End: 2017-12-14

## 2017-04-05 RX ADMIN — OXYCODONE HYDROCHLORIDE 10 MG: 5 TABLET ORAL at 06:37

## 2017-04-05 RX ADMIN — CETIRIZINE HYDROCHLORIDE 10 MG: 10 TABLET, FILM COATED ORAL at 08:15

## 2017-04-05 RX ADMIN — TOPIRAMATE 25 MG: 25 TABLET, FILM COATED ORAL at 08:14

## 2017-04-05 RX ADMIN — FOLIC ACID 1 MG: 1 TABLET ORAL at 08:15

## 2017-04-05 RX ADMIN — FERROUS GLUCONATE 324 MG: 324 TABLET ORAL at 08:14

## 2017-04-05 RX ADMIN — ACETAMINOPHEN 975 MG: 325 TABLET, FILM COATED ORAL at 10:25

## 2017-04-05 RX ADMIN — CYCLOBENZAPRINE HYDROCHLORIDE 5 MG: 5 TABLET, FILM COATED ORAL at 01:43

## 2017-04-05 RX ADMIN — CEFAZOLIN SODIUM 2 G: 2 INJECTION, SOLUTION INTRAVENOUS at 02:16

## 2017-04-05 RX ADMIN — CEFAZOLIN SODIUM 2 G: 2 INJECTION, SOLUTION INTRAVENOUS at 10:25

## 2017-04-05 RX ADMIN — PANTOPRAZOLE SODIUM 40 MG: 40 TABLET, DELAYED RELEASE ORAL at 08:15

## 2017-04-05 RX ADMIN — ACETAMINOPHEN 975 MG: 325 TABLET, FILM COATED ORAL at 01:43

## 2017-04-05 RX ADMIN — OXYCODONE HYDROCHLORIDE 5 MG: 5 TABLET ORAL at 14:22

## 2017-04-05 RX ADMIN — SERTRALINE HYDROCHLORIDE 25 MG: 25 TABLET ORAL at 08:15

## 2017-04-05 RX ADMIN — ATORVASTATIN CALCIUM 40 MG: 40 TABLET, FILM COATED ORAL at 08:14

## 2017-04-05 RX ADMIN — MAGNESIUM OXIDE TAB 400 MG (241.3 MG ELEMENTAL MG) 400 MG: 400 (241.3 MG) TAB at 08:14

## 2017-04-05 RX ADMIN — MULTIPLE VITAMINS W/ MINERALS TAB 1 TABLET: TAB at 08:14

## 2017-04-05 RX ADMIN — OXYCODONE HYDROCHLORIDE 10 MG: 5 TABLET ORAL at 01:31

## 2017-04-05 RX ADMIN — OXYCODONE HYDROCHLORIDE 10 MG: 5 TABLET ORAL at 10:24

## 2017-04-05 RX ADMIN — SENNOSIDES AND DOCUSATE SODIUM 2 TABLET: 8.6; 5 TABLET ORAL at 08:14

## 2017-04-05 NOTE — PLAN OF CARE
Problem: Goal Outcome Summary  Goal: Goal Outcome Summary  Outcome: Improving  A/O.  CMS intact.  Dressing intact.  2 SHINE drains in place.  Continues on IV Ancef.  Temp of 101 this evening, down to 100.5 now.  Taking scheduled Tylenol as well as Flexeril and Oxycodone.  Encouraged use of IS.  Lungs clear.  Ambulating with ax1 et walker with brace on.  Voiding.  Possible discharge home tomorrow.  Will continue to monitor.

## 2017-04-05 NOTE — PLAN OF CARE
Problem: Goal Outcome Summary  Goal: Goal Outcome Summary  Outcome: Improving  A&O. Assist of 1 with a walker, coset on when up. Voiding. +BS. Dressing intact with scant drainage. Pain controlled with po meds.

## 2017-04-05 NOTE — PLAN OF CARE
Problem: Goal Outcome Summary  Goal: Goal Outcome Summary     PT: Patient seen by physical therapy for treatment.  Patient reported not sleeping well on hospital bed, reported increased stiffness related to arthritis this morning.       Surgeon Discharge Plan: to home, patient reports discharging this afternoon/evening.     Current Functional Status: Patient is independent with bed mobility.  Required verbal cueing for correct donning of corset.  Is independent with sit to stand transfers with 2WW (has walker from home).  Patient amb over 200 feet with 2WW with modified independence.  Patient negotiated 4 steps with 2 handrails and SBA.  Patient attempted to  small object from floor (simulating small 6lb dog at home), unable to perform/maintain precautions, required assist to return to upright.  Discussed alternatives for working with dog at home vs picking up including having dog jump onto couch.     Barriers to Plan/Home: none, encouraged patient to have help available for handling dog    Physical Therapy Discharge Summary    Reason for therapy discharge:    Discharged to home.    Progress towards therapy goal(s). See goals on Care Plan in [facility] electronic health record for goal details.  Goals met    Therapy recommendation(s):    No further therapy is recommended.  Continue home exercise program.

## 2017-04-05 NOTE — DISCHARGE SUMMARY
[facility]    Discharge Summary  Neurosurgery    Date of Admission:  4/3/2017  Date of Discharge:  4/5/2017  Discharging Provider: Angie Angel  Date of Service (when I saw the patient): 04/05/17    Discharge Diagnoses   Active Problems:    S/P lumbar fusion      History of Present Illness   Ginger Marshall is a 76 year old female who was admitted on 4/3/2017. She presented with neurogenic claudication, low back pain and RLE pain. She underwent L3 Brewer-Rodriguez osteotomy with L3-4 transforaminal lumbar interbody fusion with Dr. Anthony Michele on 4/3/2017. Today pt is sitting up in the chair. She only notes incisional pain which is controlled with oral pain medications. She denies any leg weakness or radicular symptoms with ambulation.  She reports that she really would like to DC home today. She is up ambulating in the room without difficulty. Her drains have very low output and will be removed today.  She reports that she has a neighbor that will be helping her during her recovery.  Pt may DC home today.       Hospital Course   Ginger Marshall was admitted on 4/3/2017.  The following problems were addressed during her hospitalization:    Active Problems:    S/P lumbar fusion    Assessment: stable    Plan: DC home        I have discussed the following assessment and plan with Dr. Anthony Michele  who is in agreement with initial plan and will follow up with further consultation recommendations.    Angie Angel [facility]  [facility]  [address] [address]    Tel [phone]  Pager [phone]        Significant Results and Procedures   Post lumbar fusion     Pending Results     Unresulted Labs Ordered in the Past 30 Days of this Admission     No orders found from 2/2/2017 to 4/4/2017.          Code Status   Full Code    Primary Care Physician   Georgia Vázquez    Physical Exam   Temp: 98.8  F (37.1  C) Temp src: Oral BP: 98/50    Heart Rate: 83 Resp: 16 SpO2: 92 % O2 Device: None (Room air) Oxygen Delivery: 2 LPM  Vitals:    04/03/17 0737   Weight: 190 lb (86.2 kg)     Vital Signs with Ranges  Temp:  [97.9  F (36.6  C)-101.8  F (38.8  C)] 98.8  F (37.1  C)  Heart Rate:  [69-93] 83  Resp:  [16-18] 16  BP: ()/(42-56) 98/50  SpO2:  [91 %-95 %] 92 %  I/O last 3 completed shifts:  In: 960 [P.O.:960]  Out: 935 [Urine:800; Drains:135]    Constitutional: Awake, alert, cooperative, no apparent distress, and appears stated age.  Eyes: Lids and lashes normal, pupils equal, round and reactive to light, extra ocular muscles intact  ENT: Normocephalic, without obvious abnormality, atraumatic  Respiratory: No increased work of breathing  Skin: No bruising or bleeding, normal skin color, texture, turgor, no redness, warmth, or swelling.  Incision dressing intact  Musculoskeletal: There is no redness, warmth, or swelling of the joints.  Full range of motion noted.  Motor strength is 5 out of 5 all extremities bilaterally.  Tone is normal.  Neurologic: Awake, alert, oriented to name, place and time.  Cranial nerves II-XII are grossly intact.  Motor is 5 out of 5 bilaterally.     Neuropsychiatric: Calm, normal eye contact, alert, normal affect, oriented to self, place, time and situation, memory for past and recent events intact and thought process normal.       Time Spent on this Encounter   I, Angie Angel, personally saw the patient today and spent greater than 30 minutes discharging this patient.    Discharge Disposition   Discharged to home  Condition at discharge: Stable    Consultations This Hospital Stay   OCCUPATIONAL THERAPY ADULT IP CONSULT  PHYSICAL THERAPY ADULT IP CONSULT  ORTHOSIS SPINAL IP CONSULT  HOSPITALIST IP CONSULT    Discharge Orders     X-ray lumbar spine 2-3 views*   FSOC     Reason for your hospital stay   You were in the hospital for a L3-4 transforaminal lumbar interbody fusion     Follow-up and recommended labs and tests     Please follow up at the [facility] in 10-14 days for staple removal and in 6 weeks with a lumbar xray.  Please call the clinic at [phone] to schedule your appointment with Angie Angel CNP or Carmen Fierro CNP     Activity   Your activity upon discharge:Discharge instructions:  No lifting of more than 10 pounds, no bending, no twisting until follow up visit.  Wear brace when out of bed.    Ok to shampoo hair, shower or bathe, but, do not scrub or submerge incision under water until evaluated post-operatively in clinic.    Ok to walk as tolerated, avoid bed rest and prolonged sitting.    No contact sports until after follow up visit  No high impact activities such as; running/jogging, snowmobile or 4 cunningham riding or any other recreational vehicles.    Do not take NSAIDS (ibuprofen, advil, aleve, naproxen, etc) for pain control.    Do NOT drive while taking narcotic pain medication.    Call the [facility] at [phone] for increasing redness, swelling or pus draining from the incision, increased pain or any other questions and concerns.     Wound care and dressings   Instructions to care for your wound at home:Keep your incision clean and dry at all times.  OK to remove dressing on postop day 2.  OK to shower on postop day 3 and allow water to run over incision, pat dry after shower.  No bathing swimming or submerging in water.  Call immediately or come to ED if any drainage occurs, or you develop new pain, redness, or swelling.     Full Code     Diet   Follow this diet upon discharge: Orders Placed This Encounter     Advance Diet as Tolerated: Regular Diet Adult       Discharge Medications   Current Discharge Medication List      CONTINUE these medications which have CHANGED    Details   oxyCODONE (ROXICODONE) 5 MG IR tablet Take 1-2 tablets (5-10 mg) by mouth every 4 hours as needed for moderate to severe pain  Qty: 75 tablet, Refills: 0    Associated Diagnoses: S/P lumbar  fusion         CONTINUE these medications which have NOT CHANGED    Details   cetirizine (ZYRTEC) 10 MG tablet Take 10 mg by mouth daily      !! topiramate (TOPAMAX) 25 MG tablet Take 25 mg by mouth every morning      !! topiramate (TOPAMAX) 25 MG tablet Take 50 mg by mouth At Bedtime      sertraline (ZOLOFT) 25 MG tablet Take 25 mg by mouth daily       methotrexate 2.5 MG tablet Take 10 tablets (25 mg) by mouth once a week  Qty: 120 tablet, Refills: 1    Associated Diagnoses: Rheumatoid arthritis involving multiple sites with positive rheumatoid factor (H); High risk medications (not anticoagulants) long-term use      atorvastatin (LIPITOR) 40 MG tablet TAKE ONE TABLET BY MOUTH EVERY DAY  Qty: 90 tablet, Refills: 2    Associated Diagnoses: Hyperlipidemia LDL goal <100      pantoprazole (PROTONIX) 40 MG enteric coated tablet TAKE ONE TABLET BY MOUTH ONCE DAILY 30-60 MINUTES BEFORE A MEAL  Qty: 90 tablet, Refills: 1    Associated Diagnoses: GERD (gastroesophageal reflux disease)      VITAMIN E PO Take 2,000 Units by mouth daily       fluticasone (FLONASE) 50 MCG/ACT nasal spray Spray 2 sprays into both nostrils daily  Qty: 16 g, Refills: 3    Associated Diagnoses: Post-nasal drip      Omega-3 Fatty Acids (OMEGA-3 FISH OIL PO) Take 2,000 mg by mouth daily       folic acid (FOLVITE) 1 MG tablet Take 1 tablet (1 mg) by mouth daily  Qty: 100 tablet, Refills: 3    Associated Diagnoses: Rheumatoid arthritis involving multiple sites with positive rheumatoid factor (H); High risk medications (not anticoagulants) long-term use      multivitamin, therapeutic with minerals (MULTI-VITAMIN) TABS Take 1 tablet by mouth daily    Comments: OTC      magnesium oxide (MAG-OX) 400 (241.3 MG) MG tablet Take 1 tablet (400 mg) by mouth daily    Comments: OTC      propylene glycol (SYSTANE BALANCE) 0.6 % SOLN ophthalmic solution Place 1 drop into both eyes 4 times daily    Comments: From Dr. Genao, Ophthalmology      tofacitinib (XELJANZ  "XR) 11 MG XR tablet Take 1 tablet (11 mg) by mouth daily  Qty: 90 tablet, Refills: 3    Comments: This replaces all previous xeljanz prescriptions.  She was previously approved for xeljanz 5mg BID.  Associated Diagnoses: Rheumatoid arthritis involving multiple sites with positive rheumatoid factor (H); High risk medications (not anticoagulants) long-term use      ferrous gluconate (FERGON) 324 (38 FE) MG tablet Take 1 tablet (324 mg) by mouth 3 times daily (with meals)  Qty: 270 tablet, Refills: 1    Associated Diagnoses: Low iron      cyanocobalamin 500 MCG TABS Take 1 tablet by mouth 2 times daily      Calcium Citrate-Vitamin D (CALCIUM CITRATE + PO) Take 2 tablets by mouth daily      Cholecalciferol (VITAMIN D-3 PO) Take 1,000 Units by mouth 2 times daily      Ascorbic Acid 1000 MG TABS Take 1 tablet by mouth 3 times daily      acetaminophen (TYLENOL) 500 MG tablet Take 2 tablets (1,000 mg) by mouth every 8 hours as needed for pain  Qty: 100 tablet, Refills: 0      vitamin  B complex with vitamin C (VITAMIN  B COMPLEX) TABS Take 1 tablet by mouth daily.  Refills: 0      aspirin 81 MG tablet Take 1 tablet by mouth daily.      order for DME Respironics REMSTAR 60 Series Auto CPAP 12-15 cm H2O, Wisp nasal mask w/a s/m cushion       !! - Potential duplicate medications found. Please discuss with provider.        Allergies   Allergies   Allergen Reactions     Abatacept      Severe headaches     Adhesive Tape      Plastic tape     Celebrex [Celecoxib]      Ineffective     Ethanol      Antihistamines     Orencia [Abatacept]      Headache     Septra [Bactrim]      Sulfa Drugs      \"deathly ill\"     Tramadol      Headache               "

## 2017-04-05 NOTE — PLAN OF CARE
Problem: Goal Outcome Summary  Goal: Goal Outcome Summary  Up with SBA, ambulating with walker. Voiding in bathroom. Dressing to back changed when SHINE's removed. Incision well approximated intact with staples. CMS intact. Tolerating regular diet. Pain well controlled with oxycodone. Discharge orders in place. Prescription filled. Follow up reviewed with patient. Discharged home.

## 2017-04-05 NOTE — PROGRESS NOTES
04/04/17 0946   Quick Adds   Type of Visit Initial PT Evaluation   Living Environment   Lives With alone   Living Arrangements independent living facility   Home Accessibility grab bars present (bathtub);grab bars present (toilet)   Number of Stairs to Enter Home 0   Number of Stairs Within Home 0   Transportation Available car;family or friend will provide   Living Environment Comment Pt reports that she thinks her cleaning lady can take her to the store, or there are other services that get her to places/appts.  Patient reports she may have intermittent assist to help care for dog   Self-Care   Dominant Hand right   Usual Activity Tolerance moderate   Regular Exercise no   Equipment Currently Used at Home raised toilet;walker, rolling;walker, standard   Activity/Exercise/Self-Care Comment Pt reports that she was not very active prior to admission due to pain. Pt has a dog that she takes out 3x/day. Pt reports that she thinks her neigbor and cleaning lady will be able to help at times during her recover.   Functional Level Prior   Ambulation 1-->assistive equipment   Transferring 1-->assistive equipment   Toileting 1-->assistive equipment   Bathing 0-->independent   Dressing 0-->independent   Eating 0-->independent   Communication 0-->understands/communicates without difficulty   Swallowing 0-->swallows foods/liquids without difficulty   Cognition 0 - no cognition issues reported   Fall history within last six months no   Which of the above functional risks had a recent onset or change? dressing;transferring;ambulation;bathing;toileting   General Information   Patient/Family Goals Statement return to home   Pertinent History of Current Problem (include personal factors and/or comorbidities that impact the POC) Ginger Marshall is a 76 year old female who was admitted on 4/3/2017. She presented with neurogenic claudication, low back pain and RLE pain. She underwent L3 Brewer-Rodriguez osteotomy with L3-4 transforaminal  "lumbar interbody fusion with Dr. Anthony Michele on 4/3/2017   Precautions/Limitations fall precautions;spinal precautions   Cognitive Status Examination   Orientation orientation to person, place and time   Personal Safety and Judgment intact   Memory intact   Pain Assessment   Patient Currently in Pain (reports increased back pain during mobility)   Integumentary/Edema   Integumentary/Edema Comments mild edema at surgical site   Posture    Posture Forward head position   Range of Motion (ROM)   ROM Comment WFL   Strength   Strength Comments WFL   Bed Mobility   Bed Mobility Comments environmental set up, verbal and tactile cueing for log roll to sitting at EOB   Transfer Skills   Transfer Comments CGA with 2WW   Gait   Gait Comments amb 10 feet with 2WW with CGA   Balance   Balance Comments no LOB   Coordination   Coordination no deficits were identified   Muscle Tone   Muscle Tone no deficits were identified   General Therapy Interventions   Planned Therapy Interventions balance training;bed mobility training;gait training;strengthening;transfer training;home program guidelines;progressive activity/exercise   Clinical Impression   Criteria for Skilled Therapeutic Intervention yes, treatment indicated   PT Diagnosis decreased independence with mobility   Influenced by the following impairments pain   Clinical Presentation Stable/Uncomplicated   Clinical Decision Making (Complexity) Low complexity   Therapy Frequency` 2 times/day   Predicted Duration of Therapy Intervention (days/wks) 3 days   Anticipated Equipment Needs at Discharge (has walker for home use)   Anticipated Discharge Disposition Home   Risk & Benefits of therapy have been explained Yes   Patient, Family & other staff in agreement with plan of care Yes   [facility] AM-PAC TM \"6 Clicks\"   2016, Trustees of [facility], under license to [employer].  All rights reserved.   6 Clicks Short Forms Basic Mobility Inpatient Short Form   [place] " "[facility]  \"6 Clicks\" V.2 Basic Mobility Inpatient Short Form   1. Turning from your back to your side while in a flat bed without using bedrails? 3 - A Little   2. Moving from lying on your back to sitting on the side of a flat bed without using bedrails? 3 - A Little   3. Moving to and from a bed to a chair (including a wheelchair)? 3 - A Little   4. Standing up from a chair using your arms (e.g., wheelchair, or bedside chair)? 3 - A Little   5. To walk in hospital room? 3 - A Little   6. Climbing 3-5 steps with a railing? 3 - A Little   Basic Mobility Raw Score (Score out of 24.Lower scores equate to lower levels of function) 18   Total Evaluation Time   Total Evaluation Time (Minutes) 5     "

## 2017-04-05 NOTE — PLAN OF CARE
Problem: Goal Outcome Summary  Goal: Goal Outcome Summary  OT:       Surgeon Discharge Plan: Home     Current Functional Status: Reviewed lower body dressing techniques, pt demo'd understanding regarding lower body dressing techniques with spinal precautions.  Pt Mod I with toilet transfer after instruction, with use of grab bar.  Pt followed spinal precautions throughout session, with self correction.  Pt completed bed mobility with HOB with Mod I and extra time to complete task.  Pt and therapist discussed safe shower transfer technique and lower body bathing, pt verbalized understanding.       Barriers to Plan/Home: None anticipated. However, pt may need to find assistance to care for pets upon discharge.

## 2017-04-06 ENCOUNTER — TELEPHONE (OUTPATIENT)
Dept: FAMILY MEDICINE | Facility: CLINIC | Age: 77
End: 2017-04-06

## 2017-04-06 ENCOUNTER — TELEPHONE (OUTPATIENT)
Dept: NEUROSURGERY | Facility: CLINIC | Age: 77
End: 2017-04-06

## 2017-04-06 DIAGNOSIS — Z98.1 S/P LUMBAR FUSION: Primary | ICD-10-CM

## 2017-04-06 NOTE — TELEPHONE ENCOUNTER
"ED/Discharge Protocol    \"Hi, my name is Joditiffaniefelton Pelaez, a registered nurse, and I am calling on behalf of Dr. Vázquez's office at [facility].  I am calling to follow up and see how things are going for you after your recent visit.\"    \"I see that you were in the (ER/UC/IP) on 4/5/17.    How are you doing now that you are home?\" ok    Is patient experiencing symptoms that may require a hospital visit?  no    Discharge Instructions    \"Let's review your discharge instructions.  What is/are the follow-up recommendations?  Pt. Response: f/u with surgeon    \"Were you instructed to make a follow-up appointment?\"  Pt. Response: No.       \"When you see the provider, I would recommend that you bring your discharge instructions with you.    Medications    \"How many new medications are you on since your hospitalization/ED visit?\"    0-1  \"How many of your current medicines changed (dose, timing, name, etc.) while you were in the hospital/ED visit?\"   0-1  \"Do you have questions about your medications?\"   No  \"Were you newly diagnosed with heart failure, COPD, diabetes or did you have a heart attack?\"   No  For patients on insulin: \"Did you start on insulin in the hospital or did you have your insulin dose changed?\"   No    Medication reconciliation completed? No    Was MTM referral placed (*Make sure to put transitions as reason for referral)?   No    Call Summary    \"Do you have any questions or concerns about your condition or care plan at the moment?\"    No  Triage nurse advice given: call with questions/concerns    Patient was in ER 0 in the past year (assess appropriateness of ER visits.)      \"If you have questions or things don't continue to improve, we encourage you contact us through the main clinic number,  [phone].  Even if the clinic is not open, triage nurses are available 24/7 to help you.     We would like you to know that our clinic has extended hours (provide information).  We also have urgent care (provide " "details on closest location and hours/contact info)\"      \"Thank you for your time and take care!\"      Zackary Pelaez RN    "

## 2017-04-06 NOTE — PATIENT INSTRUCTIONS
Requesting a refill on her muscle relaxer Flexeril   Pharmacy [facility] pharmacy   DOS: 04/3/17 S/P lumbar fusion

## 2017-04-06 NOTE — TELEPHONE ENCOUNTER
This patient was discharged from [facility] on 4-5-17.    Discharge Diagnosis: stenosis, spondylolithesis, instability, S/P lumbar fusion    A follow-up visit has not been scheduled.      Number of ED/ER visits in the last 12 months:  0     Please follow-up with patient.    Kathleen Hwang,

## 2017-04-06 NOTE — TELEPHONE ENCOUNTER
Angie Angel, CNP ok to fill for Flexeril. Just need to know what pharmacy patient wants it sent to. Called and LVM. Awaiting patient call back for pharmacy info.

## 2017-04-07 RX ORDER — CYCLOBENZAPRINE HCL 10 MG
10 TABLET ORAL 3 TIMES DAILY PRN
Qty: 90 TABLET | Refills: 1 | Status: SHIPPED | OUTPATIENT
Start: 2017-04-07 | End: 2017-09-21

## 2017-04-07 NOTE — TELEPHONE ENCOUNTER
Patient called back and would like flexeril Rx sent to [facility]. Rx ordered and sent on 4/7/17.

## 2017-04-13 ENCOUNTER — OFFICE VISIT (OUTPATIENT)
Dept: NEUROSURGERY | Facility: CLINIC | Age: 77
End: 2017-04-13
Payer: COMMERCIAL

## 2017-04-13 VITALS
SYSTOLIC BLOOD PRESSURE: 104 MMHG | DIASTOLIC BLOOD PRESSURE: 63 MMHG | TEMPERATURE: 97.4 F | HEART RATE: 80 BPM | BODY MASS INDEX: 31.95 KG/M2 | WEIGHT: 192 LBS | OXYGEN SATURATION: 95 %

## 2017-04-13 DIAGNOSIS — Z98.1 S/P LUMBAR FUSION: Primary | ICD-10-CM

## 2017-04-13 PROCEDURE — 99024 POSTOP FOLLOW-UP VISIT: CPT | Performed by: NURSE PRACTITIONER

## 2017-04-13 NOTE — NURSING NOTE
"Ginger Marshall is a 76 year old female who presents for:  Chief Complaint   Patient presents with     Neurologic Problem     s/p lumbar fusion 4-3-17, staple removal        Initial Vitals:  There were no vitals taken for this visit. Estimated body mass index is 31.62 kg/(m^2) as calculated from the following:    Height as of 4/3/17: 5' 5\" (1.651 m).    Weight as of 4/3/17: 190 lb (86.2 kg).. There is no height or weight on file to calculate BSA. BP completed using cuff size: regular  Data Unavailable    Do you feel safe in your environment?  Yes  Do you need any refills today? No    Nursing Comments: s/p lumbar fusion 4-3-17, staple removal.  She rates her pain today as 5 hips and leg. Left leg numb        Sujatha Campbell    "

## 2017-04-13 NOTE — PROGRESS NOTES
Suture/Staple removal visit note:  S:  Patient is s/p L3 Smith-Rodriguez osteotomy with L3-4 transforaminal lumbar interbody fusion with Dr. Anthony Michele on 4/3/17.  She has minimal pain* and has no current concerns or questions regarding post-surgical plan of care.  She has noted increased n/t to the lateral thigh bilaterally, but states Flexeril is helpful prn.  O:  Staples removed by Sujatha Campbell MA; wound is healing well without erythema, fluctuation, or induration.  Steri-strips applied    A: Ms Marshall returned to clinic post-op for staple removal.  Patient tolerated procedure without incident.  P:      - Keep your incision clean and dry at all times. No bathing swimming or submerging in water.  Call immediately or come to ED if any drainage occurs, or you develop new pain, redness, or swelling.    - Return in 4 weeks for follow up appointment and xray  -Flexeril prn, up to TID along with Oxycodone prn     - Brace on when out of bed. No lifting greater than 10-15 pounds. No bending, twisting, or overhead reaching.     Carmen Fierro [facility]  [facility]  [address] [address]    Tel [phone]  Pager [phone]

## 2017-04-13 NOTE — PATIENT INSTRUCTIONS
- Keep your incision clean and dry at all times. No bathing swimming or submerging in water.  Call immediately or come to ED if any drainage occurs, or you develop new pain, redness, or swelling.    - Return in 4 weeks for follow up appointment and xray    - Brace on when out of bed. No lifting greater than 10-15 pounds. No bending, twisting, or overhead reaching.

## 2017-04-13 NOTE — MR AVS SNAPSHOT
After Visit Summary   4/13/2017    Ginger Marshall    MRN: [id number]           Patient Information     Date Of Birth          1940        Visit Information        Provider Department      4/13/2017 1:00 PM Carmen Fierro NP [facility]        Care Instructions    - Keep your incision clean and dry at all times. No bathing swimming or submerging in water.  Call immediately or come to ED if any drainage occurs, or you develop new pain, redness, or swelling.    - Return in 4 weeks for follow up appointment and xray    - Brace on when out of bed. No lifting greater than 10-15 pounds. No bending, twisting, or overhead reaching.           Follow-ups after your visit        Your next 10 appointments already scheduled     May 05, 2017 11:00 AM CDT   LAB with [facility]   [facility])    [address]  [place] [address]   [phone]           Patient must bring picture ID.  Patient should be prepared to give a urine specimen  Please do not eat 10-12 hours before your appointment if you are coming in fasting for labs on lipids, cholesterol, or glucose (sugar).  Pregnant women should follow their Care Team instructions. Water with medications is okay. Do not drink coffee or other fluids.   If you have concerns about taking  your medications, please ask at office or if scheduling via [employer], send a message by clicking on Secure Messaging, Message Your Care Team.            May 10, 2017 10:15 AM CDT   Nurse Only with  ANCILLARY   [facility] [place] ([facility])    [address]  [place] [address]   [phone]            May 10, 2017 10:40 AM CDT   Return Visit with Nico Byers MD   [facility] [place] ([facility])    [address]  [place] [address]   [phone]            Jun 07, 2017 11:00 AM CDT   Nurse Only with  ANCILLARY   [facility] ([facility])    [address]  [place] [address] [phone]            Sep 25, 2017  1:00 PM CDT   [facility]    [address] [address] [place] [address] [phone]              Who to contact     If you have questions or need follow up information about today's clinic visit or your schedule please contact [facility] directly at [phone].  Normal or non-critical lab and imaging results will be communicated to you by [employer], letter or phone within 4 business days after the clinic has received the results. If you do not hear from us within 7 days, please contact the clinic through [employer] or phone. If you have a critical or abnormal lab result, we will notify you by phone as soon as possible.  Submit refill requests through [employer] or call your pharmacy and they will forward the refill request to us. Please allow 3 business days for your refill to be completed.          Additional Information About Your Visit        MyChart Information     [employer] gives you secure access to your electronic health record. If you see a primary care provider, you can also send messages to your care team and make appointments. If you have questions, please call your primary care clinic.  If you do not have a primary care provider, please call [phone] and they will assist you.        Care EveryWhere ID     This is your Care EveryWhere ID. This could be used by other organizations to access your [place] medical records  [id number]        Your Vitals Were     Pulse Temperature Pulse Oximetry Breastfeeding? BMI (Body Mass Index)       80 97.4  F (36.3  C) (Oral) 95% No 31.95 kg/m2        Blood Pressure from Last 3 Encounters:   04/13/17 104/63   04/05/17 98/50   03/27/17 110/60    Weight from Last 3 Encounters:   04/13/17 192 lb (87.1 kg)   04/03/17 190 lb (86.2 kg)   03/27/17 191 lb (86.6 kg)               Today, you had the following     No orders found for display         Today's Medication Changes          These changes are accurate as of: 4/13/17  1:36 PM.  If you have any questions, ask your nurse or doctor.               These medicines have changed or have updated prescriptions.        Dose/Directions    fluticasone 50 MCG/ACT spray   Commonly known as:  FLONASE   This may have changed:    - when to take this  - reasons to take this   Used for:  Post-nasal drip        Dose:  2 spray   Spray 2 sprays into both nostrils daily   Quantity:  16 g   Refills:  3       methotrexate 2.5 MG tablet   This may have changed:  additional instructions   Used for:  Rheumatoid arthritis involving multiple sites with positive rheumatoid factor (H), High risk medications (not anticoagulants) long-term use        Dose:  25 mg   Take 10 tablets (25 mg) by mouth once a week   Quantity:  120 tablet   Refills:  1                Primary Care Provider Office Phone # Fax #    Georgia Vázquez [phone]       [address] [address]        Thank you!     Thank you for choosing [facility]  for your care. Our goal is always to provide you with excellent care. Hearing back from our patients is one way we can continue to improve our services. Please take a few minutes to complete the written survey that you may receive in the mail after your visit with us. Thank you!             Your Updated Medication List - Protect others around you: Learn how to safely use, store and throw away your medicines at www.disposemymeds.org.          This list is accurate as of: 4/13/17  1:36 PM.  Always use your most recent med list.                   Brand Name Dispense Instructions for use    AMBIEN PO      Take 5 mg by mouth       ascorbic acid 1000 MG Tabs tablet      Take 1 tablet by mouth 3 times daily       aspirin 81 MG tablet      Take 1 tablet by mouth daily.        atorvastatin 40 MG tablet    LIPITOR    90 tablet    TAKE ONE TABLET BY MOUTH EVERY DAY       CALCIUM CITRATE + PO      Take 2 tablets by mouth daily       cetirizine 10 MG tablet    zyrTEC     Take 10 mg by mouth daily       cyanocobalamin 500 MCG Tabs      Take 1 tablet by mouth 2 times daily       cyclobenzaprine 10 MG tablet    FLEXERIL    90 tablet    Take 1 tablet (10 mg) by mouth 3 times daily as needed for muscle spasms       ferrous gluconate 324 (38 FE) MG tablet    FERGON    270 tablet    Take 1 tablet (324 mg) by mouth 3 times daily (with meals)       fluticasone 50 MCG/ACT spray    FLONASE    16 g    Spray 2 sprays into both nostrils daily       folic acid 1 MG tablet    FOLVITE    100 tablet    Take 1 tablet (1 mg) by mouth daily       magnesium oxide 400 (241.3 MG) MG tablet    MAG-OX     Take 1 tablet (400 mg) by mouth daily       methotrexate 2.5 MG tablet     120 tablet    Take 10 tablets (25 mg) by mouth once a week       Multi-vitamin Tabs tablet      Take 1 tablet by mouth daily       OMEGA-3 FISH OIL PO      Take 2,000 mg by mouth daily       order for DME      Respironics REMSTAR 60 Series Auto CPAP 12-15 cm H2O, Wisp nasal mask w/a s/m cushion       oxyCODONE 5 MG IR tablet    ROXICODONE    75 tablet    Take 1-2 tablets (5-10 mg) by mouth every 4 hours as needed for moderate to severe pain       pantoprazole 40 MG EC tablet    PROTONIX    90 tablet    TAKE ONE TABLET BY MOUTH ONCE DAILY 30-60 MINUTES BEFORE A MEAL       propylene glycol 0.6 % Soln ophthalmic solution    SYSTANE BALANCE     Place 1 drop into both eyes 4 times daily       tofacitinib 11 MG 24 hr tablet    XELJANZ XR    90 tablet    Take 1 tablet (11 mg) by mouth daily       * topiramate 25 MG tablet    TOPAMAX     Take 25 mg by mouth every morning       * topiramate 25 MG tablet    TOPAMAX     Take 50 mg by mouth At Bedtime       TYLENOL 500 MG tablet   Generic drug:  acetaminophen     100 tablet    Take 2 tablets (1,000 mg) by  mouth every 8 hours as needed for pain       vitamin B complex with vitamin C Tabs tablet      Take 1 tablet by mouth daily.       VITAMIN D-3 PO      Take 1,000 Units by mouth 2 times daily       VITAMIN E PO      Take 2,000 Units by mouth daily       ZOLOFT 25 MG tablet   Generic drug:  sertraline      Take 25 mg by mouth daily       * Notice:  This list has 2 medication(s) that are the same as other medications prescribed for you. Read the directions carefully, and ask your doctor or other care provider to review them with you.

## 2017-04-14 ENCOUNTER — TELEPHONE (OUTPATIENT)
Dept: INTERNAL MEDICINE | Facility: CLINIC | Age: 77
End: 2017-04-14

## 2017-04-14 DIAGNOSIS — F51.04 PSYCHOPHYSIOLOGIC INSOMNIA: Primary | ICD-10-CM

## 2017-04-14 RX ORDER — ZOLPIDEM TARTRATE 5 MG/1
5 TABLET ORAL
Qty: 30 TABLET | Refills: 0 | Status: SHIPPED | OUTPATIENT
Start: 2017-04-14 | End: 2017-05-26

## 2017-04-14 NOTE — TELEPHONE ENCOUNTER
Zolpidem-5mg   Last Written Prescription Date:  01/02/2017  Last Fill Quantity: 90,   # refills: 0  Last Office Visit with [facility], [facility] or [facility] prescribing provider: 03/01/2017  Future Office visit:    Next 5 appointments (look out 90 days)     May 10, 2017 10:15 AM CDT   Nurse Only with [facility] ANCILLARY   [facility]    [address] [address]   [phone]            May 10, 2017 10:40 AM CDT   Return Visit with Nico Byers MD   [facility]    [address] [address]   [phone]            May 11, 2017  1:20 PM CDT   Return Visit with Carmen Fierro NP   [facility]    [address] [address]   [phone]            Jun 07, 2017 11:00 AM CDT   Nurse Only with [facility] ANCILLARY   [facility]    [address] [address]   [phone]                   Routing refill request to provider for review/approval because:  Drug not on the [facility], [facility] or [facility] refill protocol or controlled substance      Ninoska Casas  Pharmacy Technician  [facility]  Ph# [phone]  Fax# [phone]

## 2017-04-14 NOTE — TELEPHONE ENCOUNTER
Ambien prescription signed by Dr. Jones for Dr. Vázquez who is out of the office and faxed to [facility] at [phone].  Kathleen Hwang,

## 2017-05-01 DIAGNOSIS — M05.79 RHEUMATOID ARTHRITIS INVOLVING MULTIPLE SITES WITH POSITIVE RHEUMATOID FACTOR (H): ICD-10-CM

## 2017-05-01 DIAGNOSIS — Z79.899 HIGH RISK MEDICATIONS (NOT ANTICOAGULANTS) LONG-TERM USE: ICD-10-CM

## 2017-05-01 NOTE — TELEPHONE ENCOUNTER
Methotrexate 2.5mg      Last Written Prescription Date:  01/30/2017  Last Fill Quantity: 120,   # refills: 1  Last Office Visit with [facility], [facility] or [facility] prescribing provider: 02/10/2017  Future Office visit:    Next 5 appointments (look out 90 days)     May 10, 2017 10:15 AM CDT   Nurse Only with [facility] ANCILLARY   [facility] ([facility]    [address] [address]   [phone]            May 10, 2017 10:40 AM CDT   Return Visit with Nico Byers MD   [facility] ([facility])    [address] [address]   [phone]            May 11, 2017  1:20 PM CDT   Return Visit with Carmen Fierro NP   [facility] ([facility])    [address] [address]   [phone]            Jun 07, 2017 11:00 AM CDT   Nurse Only with FZ ANCILLARY   [facility] ([facility])    [address] [address]   [phone]                   Routing refill request to provider for review/approval because:  Drug not on the [facility], [facility] or [facility] refill protocol or controlled substance      Ninoska Casas  Pharmacy Technician  [facility]  Ph# [phone]  Fax# [phone]

## 2017-05-03 RX ORDER — METHOTREXATE SODIUM 2.5 MG/1
25 TABLET ORAL WEEKLY
Qty: 120 TABLET | Refills: 1 | Status: SHIPPED | OUTPATIENT
Start: 2017-05-03 | End: 2017-05-10

## 2017-05-05 DIAGNOSIS — Z79.899 HIGH RISK MEDICATIONS (NOT ANTICOAGULANTS) LONG-TERM USE: ICD-10-CM

## 2017-05-05 DIAGNOSIS — M05.79 RHEUMATOID ARTHRITIS INVOLVING MULTIPLE SITES WITH POSITIVE RHEUMATOID FACTOR (H): ICD-10-CM

## 2017-05-05 LAB
ALBUMIN SERPL-MCNC: 3.6 G/DL (ref 3.4–5)
ALP SERPL-CCNC: 61 U/L (ref 40–150)
ALT SERPL W P-5'-P-CCNC: 25 U/L (ref 0–50)
AST SERPL W P-5'-P-CCNC: 22 U/L (ref 0–45)
BASOPHILS # BLD AUTO: 0 10E9/L (ref 0–0.2)
BASOPHILS NFR BLD AUTO: 0.1 %
BILIRUB DIRECT SERPL-MCNC: <0.1 MG/DL (ref 0–0.2)
BILIRUB SERPL-MCNC: 0.2 MG/DL (ref 0.2–1.3)
CREAT SERPL-MCNC: 0.59 MG/DL (ref 0.52–1.04)
CRP SERPL-MCNC: 4.8 MG/L (ref 0–8)
DIFFERENTIAL METHOD BLD: ABNORMAL
EOSINOPHIL # BLD AUTO: 0.4 10E9/L (ref 0–0.7)
EOSINOPHIL NFR BLD AUTO: 6.3 %
ERYTHROCYTE [DISTWIDTH] IN BLOOD BY AUTOMATED COUNT: 15.7 % (ref 10–15)
ERYTHROCYTE [SEDIMENTATION RATE] IN BLOOD BY WESTERGREN METHOD: 38 MM/H (ref 0–30)
GFR SERPL CREATININE-BSD FRML MDRD: NORMAL ML/MIN/1.7M2
HCT VFR BLD AUTO: 38.4 % (ref 35–47)
HGB BLD-MCNC: 12 G/DL (ref 11.7–15.7)
LYMPHOCYTES # BLD AUTO: 1.5 10E9/L (ref 0.8–5.3)
LYMPHOCYTES NFR BLD AUTO: 23.1 %
MCH RBC QN AUTO: 30.6 PG (ref 26.5–33)
MCHC RBC AUTO-ENTMCNC: 31.3 G/DL (ref 31.5–36.5)
MCV RBC AUTO: 98 FL (ref 78–100)
MONOCYTES # BLD AUTO: 0.7 10E9/L (ref 0–1.3)
MONOCYTES NFR BLD AUTO: 9.7 %
NEUTROPHILS # BLD AUTO: 4.1 10E9/L (ref 1.6–8.3)
NEUTROPHILS NFR BLD AUTO: 60.8 %
PLATELET # BLD AUTO: 408 10E9/L (ref 150–450)
PROT SERPL-MCNC: 8.3 G/DL (ref 6.8–8.8)
RBC # BLD AUTO: 3.92 10E12/L (ref 3.8–5.2)
WBC # BLD AUTO: 6.7 10E9/L (ref 4–11)

## 2017-05-05 PROCEDURE — 36415 COLL VENOUS BLD VENIPUNCTURE: CPT | Performed by: INTERNAL MEDICINE

## 2017-05-05 PROCEDURE — 82565 ASSAY OF CREATININE: CPT | Performed by: INTERNAL MEDICINE

## 2017-05-05 PROCEDURE — 80076 HEPATIC FUNCTION PANEL: CPT | Performed by: INTERNAL MEDICINE

## 2017-05-05 PROCEDURE — 86140 C-REACTIVE PROTEIN: CPT | Performed by: INTERNAL MEDICINE

## 2017-05-05 PROCEDURE — 85025 COMPLETE CBC W/AUTO DIFF WBC: CPT | Performed by: INTERNAL MEDICINE

## 2017-05-05 PROCEDURE — 85652 RBC SED RATE AUTOMATED: CPT | Performed by: INTERNAL MEDICINE

## 2017-05-10 ENCOUNTER — OFFICE VISIT (OUTPATIENT)
Dept: RHEUMATOLOGY | Facility: CLINIC | Age: 77
End: 2017-05-10
Payer: COMMERCIAL

## 2017-05-10 ENCOUNTER — ALLIED HEALTH/NURSE VISIT (OUTPATIENT)
Dept: NURSING | Facility: CLINIC | Age: 77
End: 2017-05-10

## 2017-05-10 VITALS
HEIGHT: 66 IN | TEMPERATURE: 97.3 F | HEART RATE: 94 BPM | WEIGHT: 185 LBS | SYSTOLIC BLOOD PRESSURE: 120 MMHG | DIASTOLIC BLOOD PRESSURE: 71 MMHG | BODY MASS INDEX: 29.73 KG/M2 | OXYGEN SATURATION: 94 % | RESPIRATION RATE: 16 BRPM

## 2017-05-10 VITALS — HEIGHT: 66 IN | WEIGHT: 185.5 LBS | BODY MASS INDEX: 29.81 KG/M2

## 2017-05-10 DIAGNOSIS — E66.09 NON MORBID OBESITY DUE TO EXCESS CALORIES: Primary | Chronic | ICD-10-CM

## 2017-05-10 DIAGNOSIS — Z79.899 HIGH RISK MEDICATIONS (NOT ANTICOAGULANTS) LONG-TERM USE: ICD-10-CM

## 2017-05-10 DIAGNOSIS — M05.79 RHEUMATOID ARTHRITIS INVOLVING MULTIPLE SITES WITH POSITIVE RHEUMATOID FACTOR (H): Primary | ICD-10-CM

## 2017-05-10 PROCEDURE — 99213 OFFICE O/P EST LOW 20 MIN: CPT | Performed by: INTERNAL MEDICINE

## 2017-05-10 RX ORDER — METHOTREXATE SODIUM 2.5 MG/1
22.5 TABLET ORAL WEEKLY
Qty: 108 TABLET | Refills: 1 | Status: SHIPPED | OUTPATIENT
Start: 2017-05-10 | End: 2017-08-09

## 2017-05-10 NOTE — PROGRESS NOTES
Patient is here today for weight check.     Wt Readings from Last 4 Encounters:   05/10/17 185 lb 8 oz (84.1 kg)   04/13/17 192 lb (87.1 kg)   04/03/17 190 lb (86.2 kg)   03/27/17 191 lb (86.6 kg)     Patient does have a back brace on today due to surgery, so weight is not as accurate as it could be. Forwarding message to Dr. Vázquez. Charline Alves MA

## 2017-05-10 NOTE — NURSING NOTE
"Chief Complaint   Patient presents with     Arthritis     RA. Patient reports that she has been doing well. She has mild pain in left shoulder. Pain is intermittent but usually worse at night.        Initial /71 (BP Location: Left arm, Patient Position: Chair, Cuff Size: Adult Regular)  Pulse 94  Temp 97.3  F (36.3  C) (Oral)  Resp 16  Ht 1.67 m (5' 5.75\")  Wt 83.9 kg (185 lb)  SpO2 94%  BMI 30.09 kg/m2 Estimated body mass index is 30.09 kg/(m^2) as calculated from the following:    Height as of this encounter: 1.67 m (5' 5.75\").    Weight as of this encounter: 83.9 kg (185 lb).  BP completed using cuff size: regular         RAPID3 (0-30) Cumulative Score  5.0          RAPID3 Weighted Score (divide #4 by 3 and that is the weighted score)  1.67       Vik Eubanks CMA    "

## 2017-05-10 NOTE — MR AVS SNAPSHOT
After Visit Summary   5/10/2017    Ginger Marshall    MRN: [id number]           Patient Information     Date Of Birth          1940        Visit Information        Provider Department      5/10/2017 10:40 AM Nico Byers MD [facility]        Today's Diagnoses     Rheumatoid arthritis involving multiple sites with positive rheumatoid factor (H)    -  1    High risk medications (not anticoagulants) long-term use           Follow-ups after your visit        Your next 10 appointments already scheduled     May 11, 2017  1:20 PM CDT   Return Visit with Carmen Fierro NP   [facility] ([facility])    [address] [address]   [phone]            Jun 07, 2017 11:00 AM CDT   Nurse Only with FZ ANCILLARY   [facility] ([facility])    [address] [facility] [address] [facility] [address] [facility] [address]   [phone]            Aug 04, 2017 10:30 AM CDT   LAB with FZ LAB   [facility] ([facility])    [address] [facility] [address] [facility] [address] [facility] [address]   [phone]           Patient must bring picture ID.  Patient should be prepared to give a urine specimen  Please do not eat 10-12 hours before your appointment if you are coming in fasting for labs on lipids, cholesterol, or glucose (sugar).  Pregnant women should follow their Care Team instructions. Water with medications is okay. Do not drink coffee or other fluids.   If you have concerns about taking  your medications, please ask at office or if scheduling via [facility], send a message by clicking on Secure Messaging, Message Your Care Team.            Aug 09, 2017 10:40 AM CDT   Return Visit with Nico Byers MD   [facility] ([facility])    [address] [facility] [address]   [phone]            Sep 25, 2017  1:00 PM CDT   Return Visit with Arlet Gonzalez MD   [facility] ([facility]  [facility]    [address] [address] [place] [address] [phone]              Future tests that were ordered for you today     Open Future Orders        Priority Expected Expires Ordered    CBC with platelets differential Routine 8/4/2017 8/23/2017 5/10/2017    Creatinine Routine 8/4/2017 8/23/2017 5/10/2017    Erythrocyte sedimentation rate auto Routine 8/4/2017 8/23/2017 5/10/2017    CRP inflammation Routine 8/4/2017 8/23/2017 5/10/2017    Hepatic panel Routine 8/4/2017 8/23/2017 5/10/2017            Who to contact     If you have questions or need follow up information about today's clinic visit or your schedule please contact [facility] directly at [phone].  Normal or non-critical lab and imaging results will be communicated to you by MyChart, letter or phone within 4 business days after the clinic has received the results. If you do not hear from us within 7 days, please contact the clinic through EPIShart or phone. If you have a critical or abnormal lab result, we will notify you by phone as soon as possible.  Submit refill requests through IntenseDebate or call your pharmacy and they will forward the refill request to us. Please allow 3 business days for your refill to be completed.          Additional Information About Your Visit        EPISharRentJuice Information     IntenseDebate gives you secure access to your electronic health record. If you see a primary care provider, you can also send messages to your care team and make appointments. If you have questions, please call your primary care clinic.  If you do not have a primary care provider, please call [phone] and they will assist you.        Care EveryWhere ID     This is your Care EveryWhere ID. This could be used by other organizations to access your [place] medical records  [id number]        Your Vitals Were     Pulse Temperature Respirations Height Pulse Oximetry BMI (Body Mass Index)    94 97.3  F (36.3  C) (Oral) 16 1.67 m (5'  "5.75\") 94% 30.09 kg/m2       Blood Pressure from Last 3 Encounters:   05/10/17 120/71   04/13/17 104/63   04/05/17 98/50    Weight from Last 3 Encounters:   05/10/17 83.9 kg (185 lb)   05/10/17 84.1 kg (185 lb 8 oz)   04/13/17 87.1 kg (192 lb)                 Today's Medication Changes          These changes are accurate as of: 5/10/17 11:05 AM.  If you have any questions, ask your nurse or doctor.               These medicines have changed or have updated prescriptions.        Dose/Directions    methotrexate 2.5 MG tablet   This may have changed:  how much to take   Used for:  Rheumatoid arthritis involving multiple sites with positive rheumatoid factor (H), High risk medications (not anticoagulants) long-term use   Changed by:  Nico Byers MD        Dose:  22.5 mg   Take 9 tablets (22.5 mg) by mouth once a week   Quantity:  108 tablet   Refills:  1            Where to get your medicines      These medications were sent to [facility] [place] [address], [facility] [address]     Phone:  [phone]     methotrexate 2.5 MG tablet    tofacitinib 11 MG 24 hr tablet                Primary Care Provider Office Phone # Fax #    Georgia Vázquez [phone]       [address] [address]        Thank you!     Thank you for choosing [facility]  for your care. Our goal is always to provide you with excellent care. Hearing back from our patients is one way we can continue to improve our services. Please take a few minutes to complete the written survey that you may receive in the mail after your visit with us. Thank you!             Your Updated Medication List - Protect others around you: Learn how to safely use, store and throw away your medicines at www.disposemymeds.org.          This list is accurate as of: 5/10/17 11:05 AM.  Always use your most recent med list.                   " Brand Name Dispense Instructions for use    ascorbic acid 1000 MG Tabs tablet      Take 1 tablet by mouth 3 times daily       aspirin 81 MG tablet      Take 1 tablet by mouth daily.       atorvastatin 40 MG tablet    LIPITOR    90 tablet    TAKE ONE TABLET BY MOUTH EVERY DAY       CALCIUM CITRATE + PO      Take 2 tablets by mouth daily       cetirizine 10 MG tablet    zyrTEC     Take 10 mg by mouth daily       cyanocobalamin 500 MCG Tabs      Take 1 tablet by mouth 2 times daily       cyclobenzaprine 10 MG tablet    FLEXERIL    90 tablet    Take 1 tablet (10 mg) by mouth 3 times daily as needed for muscle spasms       ferrous gluconate 324 (38 FE) MG tablet    FERGON    270 tablet    Take 1 tablet (324 mg) by mouth 3 times daily (with meals)       fluticasone 50 MCG/ACT spray    FLONASE    16 g    Spray 2 sprays into both nostrils daily       folic acid 1 MG tablet    FOLVITE    100 tablet    Take 1 tablet (1 mg) by mouth daily       magnesium oxide 400 (241.3 MG) MG tablet    MAG-OX     Take 1 tablet (400 mg) by mouth daily       methotrexate 2.5 MG tablet     108 tablet    Take 9 tablets (22.5 mg) by mouth once a week       Multi-vitamin Tabs tablet      Take 1 tablet by mouth daily       OMEGA-3 FISH OIL PO      Take 2,000 mg by mouth daily       order for DME      Respironics REMSTAR 60 Series Auto CPAP 12-15 cm H2O, Wisp nasal mask w/a s/m cushion       oxyCODONE 5 MG IR tablet    ROXICODONE    75 tablet    Take 1-2 tablets (5-10 mg) by mouth every 4 hours as needed for moderate to severe pain       pantoprazole 40 MG EC tablet    PROTONIX    90 tablet    TAKE ONE TABLET BY MOUTH ONCE DAILY 30-60 MINUTES BEFORE A MEAL       propylene glycol 0.6 % Soln ophthalmic solution    SYSTANE BALANCE     Place 1 drop into both eyes 4 times daily Reported on 5/10/2017       tofacitinib 11 MG 24 hr tablet    XELJANZ XR    90 tablet    Take 1 tablet (11 mg) by mouth daily       * topiramate 25 MG tablet    TOPAMAX     Take  25 mg by mouth every morning       * topiramate 25 MG tablet    TOPAMAX     Take 50 mg by mouth At Bedtime Reported on 5/10/2017       TYLENOL 500 MG tablet   Generic drug:  acetaminophen     100 tablet    Take 2 tablets (1,000 mg) by mouth every 8 hours as needed for pain       vitamin B complex with vitamin C Tabs tablet      Take 1 tablet by mouth daily.       VITAMIN D-3 PO      Take 1,000 Units by mouth 2 times daily       VITAMIN E PO      Take 2,000 Units by mouth daily       ZOLOFT 25 MG tablet   Generic drug:  sertraline      Take 25 mg by mouth daily       zolpidem 5 MG tablet    AMBIEN    30 tablet    Take 1 tablet (5 mg) by mouth nightly as needed For further refills please follow up with primary care physician       * Notice:  This list has 2 medication(s) that are the same as other medications prescribed for you. Read the directions carefully, and ask your doctor or other care provider to review them with you.

## 2017-05-10 NOTE — MR AVS SNAPSHOT
After Visit Summary   5/10/2017    Ginger Marshall    MRN: [id number]           Patient Information     Date Of Birth          1940        Visit Information        Provider Department      5/10/2017 10:15 AM F[facility] ANCILLARY [facility]        Today's Diagnoses     Obesity    -  1       Follow-ups after your visit        Your next 10 appointments already scheduled     May 11, 2017  1:20 PM CDT   Return Visit with Carmen Fierro NP   [facility] ([facility])    [address] [facility] [address]   [phone]            Jun 07, 2017 11:00 AM CDT   Nurse Only with FZ ANCILLARY   [facility] ([facility])    [address] [facility] [address] [phone]            Sep 25, 2017  1:00 PM CDT   Return Visit with Arlet Gonzalez MD   [facility] ([facility])    [address] [place] [address] [phone]              Who to contact     If you have questions or need follow up information about today's clinic visit or your schedule please contact [facility] directly at [phone].  Normal or non-critical lab and imaging results will be communicated to you by Fantastechart, letter or phone within 4 business days after the clinic has received the results. If you do not hear from us within 7 days, please contact the clinic through Fantastechart or phone. If you have a critical or abnormal lab result, we will notify you by phone as soon as possible.  Submit refill requests through dotHIV or call your pharmacy and they will forward the refill request to us. Please allow 3 business days for your refill to be completed.          Additional Information About Your Visit        FantastecharCompass Information     dotHIV gives you secure access to your electronic health record. If you see a primary care provider, you can also send messages to your care team and make  "appointments. If you have questions, please call your primary care clinic.  If you do not have a primary care provider, please call [phone] and they will assist you.        Care EveryWhere ID     This is your Care EveryWhere ID. This could be used by other organizations to access your [place] medical records  [id number]        Your Vitals Were     Height BMI (Body Mass Index)                5' 5.75\" (1.67 m) 30.17 kg/m2           Blood Pressure from Last 3 Encounters:   05/10/17 120/71   04/13/17 104/63   04/05/17 98/50    Weight from Last 3 Encounters:   05/10/17 185 lb (83.9 kg)   05/10/17 185 lb 8 oz (84.1 kg)   04/13/17 192 lb (87.1 kg)              Today, you had the following     No orders found for display       Primary Care Provider Office Phone # Fax #    Georgia Vázquez [phone]       [place] [id number] [facility] [address]        Thank you!     Thank you for choosing [facility]  for your care. Our goal is always to provide you with excellent care. Hearing back from our patients is one way we can continue to improve our services. Please take a few minutes to complete the written survey that you may receive in the mail after your visit with us. Thank you!             Your Updated Medication List - Protect others around you: Learn how to safely use, store and throw away your medicines at www.disposemymeds.org.          This list is accurate as of: 5/10/17 10:42 AM.  Always use your most recent med list.                   Brand Name Dispense Instructions for use    ascorbic acid 1000 MG Tabs tablet      Take 1 tablet by mouth 3 times daily       aspirin 81 MG tablet      Take 1 tablet by mouth daily.       atorvastatin 40 MG tablet    LIPITOR    90 tablet    TAKE ONE TABLET BY MOUTH EVERY DAY       CALCIUM CITRATE + PO      Take 2 tablets by mouth daily       cetirizine 10 MG tablet    zyrTEC     Take 10 mg by mouth daily       cyanocobalamin " 500 MCG Tabs      Take 1 tablet by mouth 2 times daily       cyclobenzaprine 10 MG tablet    FLEXERIL    90 tablet    Take 1 tablet (10 mg) by mouth 3 times daily as needed for muscle spasms       ferrous gluconate 324 (38 FE) MG tablet    FERGON    270 tablet    Take 1 tablet (324 mg) by mouth 3 times daily (with meals)       fluticasone 50 MCG/ACT spray    FLONASE    16 g    Spray 2 sprays into both nostrils daily       folic acid 1 MG tablet    FOLVITE    100 tablet    Take 1 tablet (1 mg) by mouth daily       magnesium oxide 400 (241.3 MG) MG tablet    MAG-OX     Take 1 tablet (400 mg) by mouth daily       methotrexate 2.5 MG tablet     120 tablet    Take 10 tablets (25 mg) by mouth once a week       Multi-vitamin Tabs tablet      Take 1 tablet by mouth daily       OMEGA-3 FISH OIL PO      Take 2,000 mg by mouth daily       order for DME      Respironics REMSTAR 60 Series Auto CPAP 12-15 cm H2O, Wisp nasal mask w/a s/m cushion       oxyCODONE 5 MG IR tablet    ROXICODONE    75 tablet    Take 1-2 tablets (5-10 mg) by mouth every 4 hours as needed for moderate to severe pain       pantoprazole 40 MG EC tablet    PROTONIX    90 tablet    TAKE ONE TABLET BY MOUTH ONCE DAILY 30-60 MINUTES BEFORE A MEAL       propylene glycol 0.6 % Soln ophthalmic solution    SYSTANE BALANCE     Place 1 drop into both eyes 4 times daily Reported on 5/10/2017       tofacitinib 11 MG 24 hr tablet    XELJANZ XR    90 tablet    Take 1 tablet (11 mg) by mouth daily       * topiramate 25 MG tablet    TOPAMAX     Take 25 mg by mouth every morning       * topiramate 25 MG tablet    TOPAMAX     Take 50 mg by mouth At Bedtime Reported on 5/10/2017       TYLENOL 500 MG tablet   Generic drug:  acetaminophen     100 tablet    Take 2 tablets (1,000 mg) by mouth every 8 hours as needed for pain       vitamin B complex with vitamin C Tabs tablet      Take 1 tablet by mouth daily.       VITAMIN D-3 PO      Take 1,000 Units by mouth 2 times daily        VITAMIN E PO      Take 2,000 Units by mouth daily       ZOLOFT 25 MG tablet   Generic drug:  sertraline      Take 25 mg by mouth daily       zolpidem 5 MG tablet    AMBIEN    30 tablet    Take 1 tablet (5 mg) by mouth nightly as needed For further refills please follow up with primary care physician       * Notice:  This list has 2 medication(s) that are the same as other medications prescribed for you. Read the directions carefully, and ask your doctor or other care provider to review them with you.

## 2017-05-10 NOTE — PROGRESS NOTES
Rheumatology Clinic Visit      Ginger Marshall MRN# [id number]   YOB: 1940 Age: 76 year old      Date of visit: 5/10/17   PCP: Georgia Vázquez MD     Chief Complaint   Patient presents with:  Arthritis: RA. Patient reports that she has been doing well. She has mild pain in left shoulder. Pain is intermittent but usually worse at night.     Assessment and Plan   1. Seropositive (, CCP >100; hx of rheumatoid nodule by 6/14/2011 pathology) Erosive Rheumatoid Arthritis: Previously failed remicade (staph infection during therapy, but was immediately after a joint injection) and orencia (migraines). Currently on methotrexate 25 mg once weekly, folic acid 1 mg daily, Xeljanz XR 11mg daily. She also has prednisone 20 mg daily ×5 days to use as needed for flare; she has not required this.  Doing well today and therefore reduce methotrexate slowly: Reduce methotrexate to 22.5 mg once weekly.   - Reduce methotrexate to 22.5 mg once weekly; split dose between AM and PM of the same day  - Continue folic acid 1 mg daily; she has history of oral sores and may increase folic acid to 2 mg daily if needed  - Continue Xeljanz XR 11mg daily  - PRN Flare: prednisone 20mg daily x5days  - Labs 2-3 days prior to the next rheumatology clinic visit: CBC, Creatinine, Hepatic Panel, ESR, CRP    2. Right hip pain: Status post WALTER twice in the past. Followed by [facility] orthopedics.  Note that she does have spinal stenosis that may be contributing and she has had a steroid injection of her back with reduction of her back pain only.  This is not an issue today.    3. Chronic back pain, improved s/p fusion: She has been to an orthopedic surgeon told her that she needed an operation but then when she went to see her orthopedic surgeon that did the hip replacement he told her not to have the surgery. She then went to see Dr. Michele, neurosurgeon, and recently had a lumbar fusion. She says that she feels much better now and is a  little bit taller.     4. Bone Health: Managed by Endocrinology.     5. Iron Deficiency Anemia: Managed by PCP.     Ms. Marshall verbalized agreement with and understanding of the rational for the diagnosis and treatment plan.  All questions were answered to best of my ability and the patient's satisfaction. Ms. Marshall was advised to contact the clinic with any questions that may arise after the clinic visit.      Thank you for involving me in the care of the patient    Return to clinic: 3 months    HPI   Ginger Marshall is a 76 year old female with a history of multiple foot surgeries, hand tendon transfers, MCP replacements (most recent being in August 2015), bilateral TKA, right WALTER, left distal radius fracture history, s/p lumbar fusion, osteoporosis and seropositive (RF+, CCP+) erosive rheumatoid arthritis who presents for follow-up of rheumatoid arthritis.      Today, Ms. Marshall reports that her rheumatoid arthritis is doing great. She is tolerating medications well. She occasionally has an oral sore and therefore increased folic acid to 2 mg daily that was helpful; no recurrence of sore since increasing folic acid to this dose. She had lumbar fusion that went well; she says that she is now able to stand up straight and is a little bit taller.     She continues to follow with endocrinology for osteoporosis management.    Denies fevers, chills, nausea, vomiting, constipation, diarrhea. No abdominal pain. No chest pain/pressure, palpitations, or shortness of breath. No nasal or oral sores.   No neck pain. No rash. No LE swelling.     Tobacco: quit in 1999  EtOH: 1 glass of wine every month at most  Drugs: None  Occupation: , retired     ROS   GEN: No fevers, chills  SKIN: No itching, rashes, sores  HEENT:  No epistaxis. No oral or nasal ulcers.  CV: No chest pain, pressure, palpitations, or dyspnea on exertion.  PULM: No SOB, wheeze, cough.  GI:  No nausea, vomiting, constipation, diarrhea. No  blood in stool. No abdominal pain.  : No blood in urine.  MSK: See HPI.  NEURO: No numbness or tingling  EXT: No LE swelling    Active Problem List     Patient Active Problem List   Diagnosis     RA (Rheumatoid Arthritis) off Plaquenil     Menopause     History of colonic polyps     Mitral Regurgitation     Multinodular goiter     CARDIOVASCULAR SCREENING; LDL GOAL LESS THAN 130     Psychophysiologic insomnia     Pulmonary nodule     PSEUDOPHAKIA OU     PVD (POSTERIOR VITREOUS DETACHMENT) OU     PXF (PSEUDOEXFOLIATION OF LENS CAPSULE) OD     GERD (gastroesophageal reflux disease)     Mild major depression (H)     Hyperlipidemia LDL goal <100     Advance Care Planning     Neuropathy (H)     SHERRY (obstructive sleep apnea)-severe (AHI 35)     zEncounter for counseling     Anemia of chronic disease     Migraine     Osteoporosis     Cornea guttata, ou     Conjunctival concretions     Stenosis, spinal, lumbar     Other chronic pain     Obesity     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     MGD (meibomian gland dysfunction)     Blepharitis of both eyes     Personal history of healed osteoporosis fracture     Iron malabsorption     Hyperglycemia     Chronic bilateral low back pain without sciatica     Allergic state, subsequent encounter     BMI 32.0-32.9,adult     Spinal stenosis of lumbar region without neurogenic claudication     Herniated nucleus pulposus, L3-4     S/P lumbar fusion     Past Medical History     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     History of total knee replacement 7/23/2009     Menopause late 40's     Other chronic pain     joints     Pelvic fracture (H) 5/13/2014     PUD (peptic ulcer disease)      Rheumatoid arteritis      Sleep apnea     Uses a CPAP     Vitamin B12 deficiency      Past Surgical History     Past Surgical History:  "  Procedure Laterality Date     ABDOMEN SURGERY      c-sections     ARTHROSCOPY KNEE RT/LT  01/06    left     BACK SURGERY  2013    disc     BREAST BIOPSY, RT/LT      left benign     BREAST SURGERY  90's    lumpectomy     C ANESTH,TOTAL HIP ARTHROPLASTY  2010    Rt hip     C HAND/FINGER SURGERY UNLISTED  11/05    right hand     C NONSPECIFIC PROCEDURE  91    left foot surgery     C NONSPECIFIC PROCEDURE  95    R MCP surgery     C TOTAL KNEE ARTHROPLASTY  2006    left     C/SECTION, LOW TRANSVERSE  66,72    x 2     CATARACT IOL, RT/LT       COLONOSCOPY  2006,2009     EYE SURGERY      cataracs     HC ESOPH/GAS REFLUX TEST W NASAL IMPED >1 HR  2/1/2012    Procedure:ESOPHAGEAL IMPEDENCE FUNCTION TEST WITH 24 HOUR PH GREATER THAN 1 HOUR; Surgeon:KAYKAY JULIO; Location:[facility]     IR CAUDAL EPIDURAL INJECTION SINGLE  5/2/2012    LESI L5-S1 at [facility]     OPTICAL TRACKING SYSTEM FUSION POSTERIOR SPINE LUMBAR N/A 4/3/2017    Procedure: OPTICAL TRACKING SYSTEM FUSION SPINE POSTERIOR LUMBAR ONE LEVEL;  Surgeon: Anthony Michele MD;  Location: [facility]     SURGICAL HISTORY OF -   2007    right knee total replacement     Allergy     Allergies   Allergen Reactions     Abatacept      Severe headaches     Adhesive Tape      Plastic tape     Celebrex [Celecoxib]      Ineffective     Ethanol      Antihistamines     Orencia [Abatacept]      Headache     Septra [Bactrim]      Sulfa Drugs      \"deathly ill\"     Tramadol      Headache     Current Medication List     Current Outpatient Prescriptions   Medication Sig     methotrexate 2.5 MG tablet Take 10 tablets (25 mg) by mouth once a week     zolpidem (AMBIEN) 5 MG tablet Take 1 tablet (5 mg) by mouth nightly as needed For further refills please follow up with primary care physician     cyclobenzaprine (FLEXERIL) 10 MG tablet Take 1 tablet (10 mg) by mouth 3 times daily as needed for muscle spasms     oxyCODONE (ROXICODONE) 5 MG IR tablet Take 1-2 tablets (5-10 mg) by mouth " every 4 hours as needed for moderate to severe pain     cetirizine (ZYRTEC) 10 MG tablet Take 10 mg by mouth daily     topiramate (TOPAMAX) 25 MG tablet Take 25 mg by mouth every morning     sertraline (ZOLOFT) 25 MG tablet Take 25 mg by mouth daily      atorvastatin (LIPITOR) 40 MG tablet TAKE ONE TABLET BY MOUTH EVERY DAY     pantoprazole (PROTONIX) 40 MG enteric coated tablet TAKE ONE TABLET BY MOUTH ONCE DAILY 30-60 MINUTES BEFORE A MEAL     VITAMIN E PO Take 2,000 Units by mouth daily      Omega-3 Fatty Acids (OMEGA-3 FISH OIL PO) Take 2,000 mg by mouth daily      folic acid (FOLVITE) 1 MG tablet Take 1 tablet (1 mg) by mouth daily     multivitamin, therapeutic with minerals (MULTI-VITAMIN) TABS Take 1 tablet by mouth daily     magnesium oxide (MAG-OX) 400 (241.3 MG) MG tablet Take 1 tablet (400 mg) by mouth daily     tofacitinib (XELJANZ XR) 11 MG XR tablet Take 1 tablet (11 mg) by mouth daily     ferrous gluconate (FERGON) 324 (38 FE) MG tablet Take 1 tablet (324 mg) by mouth 3 times daily (with meals)     cyanocobalamin 500 MCG TABS Take 1 tablet by mouth 2 times daily     Calcium Citrate-Vitamin D (CALCIUM CITRATE + PO) Take 2 tablets by mouth daily     Ascorbic Acid 1000 MG TABS Take 1 tablet by mouth 3 times daily     acetaminophen (TYLENOL) 500 MG tablet Take 2 tablets (1,000 mg) by mouth every 8 hours as needed for pain     vitamin  B complex with vitamin C (VITAMIN  B COMPLEX) TABS Take 1 tablet by mouth daily.     aspirin 81 MG tablet Take 1 tablet by mouth daily.     topiramate (TOPAMAX) 25 MG tablet Take 50 mg by mouth At Bedtime Reported on 5/10/2017     order for [facility] REMSTAR 60 Series Auto CPAP 12-15 cm H2O, Wisp nasal mask w/a s/m cushion     fluticasone (FLONASE) 50 MCG/ACT nasal spray Spray 2 sprays into both nostrils daily (Patient not taking: Reported on 5/10/2017)     [DISCONTINUED] methotrexate 2.5 MG tablet Take 10 tablets (25 mg) by mouth once a week .  Split dose between AM  "and PM (5 tabs in the morning, and 5 tabs in the evening; all taken within the same day each week)     propylene glycol (SYSTANE BALANCE) 0.6 % SOLN ophthalmic solution Place 1 drop into both eyes 4 times daily Reported on 5/10/2017     Cholecalciferol (VITAMIN D-3 PO) Take 1,000 Units by mouth 2 times daily     No current facility-administered medications for this visit.      Social History   See HPI    Family History     Family History   Problem Relation Age of Onset     Arthritis Mother      Alzheimer Disease Mother      Hyperlipidemia Mother      OSTEOPOROSIS Mother      HEART DISEASE Father      MI ( from this)     Alcohol/Drug Father      Arthritis Sister      Hypertension Sister      CANCER Son      DIABETES Son      Neurologic Disorder Sister      Schizophrenic     Hypertension Sister      Hyperlipidemia Sister      MENTAL ILLNESS Sister      DIABETES Son      Other Cancer Son      No change in family history since the previous clinic visit.    Physical Exam     Temp Readings from Last 3 Encounters:   05/10/17 97.3  F (36.3  C) (Oral)   17 97.4  F (36.3  C) (Oral)   17 98.8  F (37.1  C) (Oral)     BP Readings from Last 5 Encounters:   05/10/17 120/71   17 104/63   17 98/50   17 110/60   17 112/58     Pulse Readings from Last 1 Encounters:   05/10/17 94     Resp Readings from Last 1 Encounters:   05/10/17 16     Estimated body mass index is 30.09 kg/(m^2) as calculated from the following:    Height as of this encounter: 1.67 m (5' 5.75\").    Weight as of this encounter: 83.9 kg (185 lb).    GEN: NAD, overweight  HEENT: MMM. No oral lesions. Anicteric, noninjected sclera  CV: S1, S2. RRR. No m/r/g.  PULM: CTA bilaterally. No w/c.  MSK: Mild synovial swelling without tenderness to palpation of the bilateral second-third MCPs. PIPs and DIPs without tenderness to palpation or swelling. Mild subluxation of the bilateral MCPs. Right hand with well healed old surgical scars " over the MCPs. Bilateral wrists without synovial swelling or tenderness to palpation. Bilateral shoulders with impingement; bilateral shoulders mildly tender to palpation; no increased warmth or swelling. Bilateral hips nontender to palpation.  Bilateral knees and ankles without swelling or tenderness palpation. Negative MTP squeeze. All peripheral joints without increased warmth.  SKIN: No rash  EXT: No LE edema  PSYCH: Alert. Appropriate.    Labs   RF/CCP  Recent Labs   Lab Test  09/09/11   0843  03/20/09   1211   CYCLICCITPEP   --   >100 Interpretation:  Positive*   RHF  38*  163*     ELLY/RNP/Sm/SSA/SSB/dsDNA  Recent Labs   Lab Test  07/24/13   1024  09/09/11   0843   ENASSA  8  2   ENASSB  0  0   A3FYHQG   --   180   E3UYLEA   --   29     IgG  Recent Labs   Lab Test  07/24/13   1024  01/26/11   0901   IGG  847  686*     CBC  Recent Labs   Lab Test  05/05/17   1046  04/04/17   0922  03/27/17   1142  02/08/17   1241  01/27/17   1511   WBC  6.7  9.0  7.4  4.6  5.1   RBC  3.92  3.27*  3.98  3.65*  3.89   HGB  12.0  10.2*  12.7  11.5*  11.9   HCT  38.4  32.6*  39.0  36.4  38.5   MCV  98  100  98  100  99   RDW  15.7*  14.8  15.1*  15.9*  15.4*   PLT  408  313  403  364  408   MCH  30.6  31.2  31.9  31.5  30.6   MCHC  31.3*  31.3*  32.6  31.6  30.9*   NEUTROPHIL  60.8   --    --   54.8  46.8   LYMPH  23.1   --    --   25.2  31.6   MONOCYTE  9.7   --    --   13.7  14.1   EOSINOPHIL  6.3   --    --   6.1  7.3   BASOPHIL  0.1   --    --   0.2  0.2   ANEU  4.1   --    --   2.5  2.4   ALYM  1.5   --    --   1.2  1.6   MARYURI  0.7   --    --   0.6  0.7   AEOS  0.4   --    --   0.3  0.4   ABAS  0.0   --    --   0.0  0.0     CMP  Recent Labs   Lab Test  05/05/17   1046  03/27/17   1142  02/08/17   1241  01/27/17   1511  10/28/16   1239   NA   --   139   --   142  143   POTASSIUM   --   4.0   --   4.1  4.3   CHLORIDE   --   107   --   107  109   CO2   --   25   --   25  27   ANIONGAP   --   7   --   10  7   GLC   --   96   --    98  97   BUN   --   11   --   10  11   CR  0.59  0.62  0.80  0.62  0.70   GFRESTIMATED  >90  Non  GFR Calc    >90  Non  GFR Calc    70  >90  Non  GFR Calc    81   GFRESTBLACK  >90   GFR Calc    >90   GFR Calc    85  >90   GFR Calc    >90   GFR Calc     BRANDEE   --   9.7   --   9.9  9.4   BILITOTAL  0.2   --   0.2  0.3  0.4   ALBUMIN  3.6   --   3.6  3.8  4.0   PROTTOTAL  8.3   --   7.4  7.6  7.6   ALKPHOS  61   --   41  45  46   AST  22   --   21  35  22   ALT  25   --   37  39  31     HgA1c  Recent Labs   Lab Test  10/28/16   1238  04/29/16   1137   A1C  5.5  6.2*     Iron Studies  Recent Labs   Lab Test  02/15/16   1151  12/30/15   0842  03/26/14   1044   07/24/13   1024  04/13/12   1121   JUANA  93  16  10   < >  9*  17   IRON   --   31*   --    --   94  86   FEB   --   443*   --    --   413  345   IRONSAT   --   7*   --    --   23  25    < > = values in this interval not displayed.     Calcium/VitaminD  Recent Labs   Lab Test  03/27/17   1142  01/27/17   1511  10/28/16   1239   02/05/13   1050   BRANDEE  9.7  9.9  9.4   < >  9.3   VITDT   --    --   37   --   33    < > = values in this interval not displayed.     ESR/CRP  Recent Labs   Lab Test  05/05/17   1046  02/08/17   1241  10/28/16   1239   SED  38*  34*  22   CRP  4.8  7.0  3.0     CK/Aldolase  Recent Labs   Lab Test  04/13/12   0831  02/02/12   0843   CKT  106  48     TSH/T4  Recent Labs   Lab Test  01/27/17   1511  08/10/16   1225  12/30/15   0842   02/05/13   1050  11/14/11   1301   TSH  0.97  0.67  1.07   < >  0.57  0.89   T4  0.95   --    --    --   1.07  0.87    < > = values in this interval not displayed.     Lipid Panel  Recent Labs   Lab Test  10/28/16   1238  12/30/15   0842  01/13/14   1109  07/24/13   1024   04/13/12   0831   CHOL  176  182  135  177   --   167   TRIG  103  70  89  80   --   115   HDL  77  92  49*  89   --   59   LDL  78  76   "68  72   < >  85   VLDL   --    --   18  16   --   23   CHOLHDLRATIO   --    --   2.8  2.0   --   2.8   NHDL  99  90   --    --    --    --     < > = values in this interval not displayed.     Hepatitis B  Recent Labs   Lab Test  09/15/15   1159  04/30/12   1005  05/22/09   1202   AUSAB  0.11   --    --    HBCAB  Nonreactive   --    --    HEPBANG  Nonreactive  Negative  Negative     Hepatitis C  Recent Labs   Lab Test  09/15/15   1159  04/30/12   1005  05/22/09   1202   HCVAB  Nonreactive   Assay performance characteristics have not been established for newborns,   infants, and children    Negative  Negative     Tuberculosis Screening  Recent Labs   Lab Test  09/15/15   1200  04/30/12   1006   TBRSLT  Negative  Negative   TBAGN  0.00  0.00     UA  Recent Labs   Lab Test  07/24/13   1035  09/28/12   1500  09/09/11   1057  01/26/11   0902   COLOR  Yellow  Yellow  Yellow  Yellow   APPEARANCE  Clear  Clear  Clear  Clear   URINEGLC  Negative  Negative  Negative  Negative   URINEBILI  Negative  Negative  Negative  Negative   SG  <=1.005  >1.030  1.007  1.010   URINEPH  6.0  5.0  6.0  6.5   PROTEIN  Negative  Negative  Negative  Negative   UROBILINOGEN  0.2  0.2   --   0.2   NITRITE  Negative  Negative  Negative  Negative   UBLD  Negative  Trace*  Negative  Negative   LEUKEST  Negative  Negative  Trace*  Negative   WBCU  O - 2  O - 2  O - 2  O - 2   RBCU  O - 2  O - 2  O - 2  O - 2   SQUAMOUSEPI  Few  Few   --   Few   BACTERIA   --   Few*   --    --    MUCOUS   --   Present*   --    --      Urine Microscopic  Recent Labs   Lab Test  07/24/13   1035  09/28/12   1500  09/09/11   1057  01/26/11   0902   WBCU  O - 2  O - 2  O - 2  O - 2   RBCU  O - 2  O - 2  O - 2  O - 2   SQUAMOUSEPI  Few  Few   --   Few   BACTERIA   --   Few*   --    --    MUCOUS   --   Present*   --    --      \"HAND BILATERAL THREE OR MORE VIEWS 9/15/2015 12:28 PM   HISTORY: Rheumatoid arthritis; establish baseline. Rheumatoid  arthritis(714.0)  COMPARISON: " "None  IMPRESSION  IMPRESSION: There is diffuse osteopenia. Postoperative changes of the  right second and third metacarpophalangeal joints. Moderate joint  space narrowing involving the left second and third  metacarpophalangeal joints. Mild joint space narrowing of the right  fourth and fifth metacarpophalangeal joints. There are also small  periarticular erosive changes of the metacarpophalangeal joints. There  is fusion of several of the right carpal bones. Marked joint space  loss in the left wrist. Findings are consistent with the clinical  history of rheumatoid arthritis. Chronic fracture deformities of the  right distal radius and ulna. No acute fracture is seen.  SPENCER MONSALVE MD\"    Immunization History     Immunization History   Administered Date(s) Administered     Influenza (High Dose) 3 valent vaccine 10/28/2013, 09/23/2014, 11/11/2015, 09/23/2016     Influenza (IIV3) 10/14/2007, 10/21/2009     Mantoux 11/03/2006     Pneumococcal (PCV 13) 07/29/2015     Pneumococcal 23 valent 06/26/2000, 06/02/2010     TD (ADULT, 7+) 07/20/2009          Chart documentation done in part with Dragon Voice recognition Software. Although reviewed after completion, some word and grammatical error may remain.    Nico Byers MD  "

## 2017-05-11 ENCOUNTER — OFFICE VISIT (OUTPATIENT)
Dept: NEUROSURGERY | Facility: CLINIC | Age: 77
End: 2017-05-11
Payer: COMMERCIAL

## 2017-05-11 ENCOUNTER — RADIANT APPOINTMENT (OUTPATIENT)
Dept: GENERAL RADIOLOGY | Facility: CLINIC | Age: 77
End: 2017-05-11
Attending: NURSE PRACTITIONER
Payer: COMMERCIAL

## 2017-05-11 VITALS
WEIGHT: 186 LBS | BODY MASS INDEX: 30.25 KG/M2 | DIASTOLIC BLOOD PRESSURE: 69 MMHG | OXYGEN SATURATION: 93 % | TEMPERATURE: 97.1 F | HEART RATE: 83 BPM | SYSTOLIC BLOOD PRESSURE: 109 MMHG

## 2017-05-11 DIAGNOSIS — Z98.1 S/P LUMBAR FUSION: Primary | ICD-10-CM

## 2017-05-11 DIAGNOSIS — Z98.1 S/P LUMBAR FUSION: ICD-10-CM

## 2017-05-11 PROCEDURE — 99024 POSTOP FOLLOW-UP VISIT: CPT | Performed by: NURSE PRACTITIONER

## 2017-05-11 PROCEDURE — 72100 X-RAY EXAM L-S SPINE 2/3 VWS: CPT

## 2017-05-11 NOTE — PROGRESS NOTES
[facility]  Neurosurgery followup:    HPI: Ginger Marshall is a 75 yo female who is s/p L3 Smith-Rodriguez osteotomy with L3-4 transforaminal lumbar interbody fusion with Dr. Anthony Michele on 4/3/17.   She states that she is slowly starting to feel better.  She has low back stiffness in the morning and has been taking Oxycodone in the am.  She takes Flexeril qHS and finds it helpful to sleep.  She denies weakness to the extremities or changes to bowel or bladder.    Exam:  Constitutional:  Alert, well nourished, NAD.  HEENT: Normocephalic, atraumatic.   Pulm:  Without shortness of breath   CV:  No pitting edema of BLE.      Neurological:  Awake  Alert  Oriented x 3  Motor exam:        IP Q DF PF EHL  R   5  5   5   5    5  L   5  5   5   5    5     Able to spontaneously move L/E bilaterally  Sensation intact throughout all L/E dermatomes     Incision: Clean and dry; scabbing noted, wound edges not completely approximated.  Will place steri strips.  Imaging: Per Xray fusion intact and stable    A/P: S/p L3 Smith-Rodriguez osteotomy with L3-4 TLIF  Ginger states the pain has improved and she is gradually doing better since surgery.  She does admit to picking at her incision.  Steri-strips were applied and we had a discussion of the importance of not picking at the incision due to risk of infection and possible wound revision surgery.  She agrees to not pick, and will contact us if any drainage, fever or concerns.    Patient Instructions    Wean from brace per patient comfort   - May increase lifting restriction to 20 pounds   - followup in 6 weeks with xray prior   - Call the clinic at [phone] for increasing redness, swelling or pus draining from the incision, increased pain or any other questions and concerns.        [facility]  [facility]  [address]  [address] [place] [id number]    Tel [phone]  Pager [phone]

## 2017-05-11 NOTE — PATIENT INSTRUCTIONS
Wean from brace per patient comfort   - May increase lifting restriction to 20 pounds   - followup in 6 weeks with xray prior   - Call the clinic at [phone] for increasing redness, swelling or pus draining from the incision, increased pain or any other questions and concerns.

## 2017-05-11 NOTE — NURSING NOTE
"Ginger Marshall is a 76 year old female who presents for:  Chief Complaint   Patient presents with     Neurologic Problem     s/p lumbar fusion 4-3-17        Initial Vitals:  There were no vitals taken for this visit. Estimated body mass index is 30.09 kg/(m^2) as calculated from the following:    Height as of 5/10/17: 5' 5.75\" (1.67 m).    Weight as of 5/10/17: 185 lb (83.9 kg).. There is no height or weight on file to calculate BSA. BP completed using cuff size: regular forearm  Data Unavailable    Do you feel safe in your environment?  Yes  Do you need any refills today? No    Nursing Comments: s/p lumbar fusion 4-3-17        Sujatha Campbell CMA, AAS    "

## 2017-05-11 NOTE — MR AVS SNAPSHOT
After Visit Summary   5/11/2017    Ginger Marshall    MRN: [id number]           Patient Information     Date Of Birth          1940        Visit Information        Provider Department      5/11/2017 1:20 PM Carmen Fierro NP [facility]        Today's Diagnoses     S/P lumbar fusion    -  1      Care Instructions     Wean from brace per patient comfort   - May increase lifting restriction to 20 pounds   - followup in 6 weeks with xray prior   - Call the clinic at [phone] for increasing redness, swelling or pus draining from the incision, increased pain or any other questions and concerns.            Follow-ups after your visit        Your next 10 appointments already scheduled     Jun 07, 2017 11:00 AM CDT   Nurse Only with [facility] ANCILLARY   [facility] ([facility])    [address] [address]   [phone]            Aug 04, 2017 10:30 AM CDT   LAB with [facility] LAB   [facility] ([facility])    [address] [address] [phone]           Patient must bring picture ID.  Patient should be prepared to give a urine specimen  Please do not eat 10-12 hours before your appointment if you are coming in fasting for labs on lipids, cholesterol, or glucose (sugar).  Pregnant women should follow their Care Team instructions. Water with medications is okay. Do not drink coffee or other fluids.   If you have concerns about taking  your medications, please ask at office or if scheduling via Capabluehart, send a message by clicking on Secure Messaging, Message Your Care Team.            Aug 09, 2017 10:40 AM CDT   Return Visit with Nico Byers MD   [facility] ([facility])    [address] [address]   [phone]            Sep 25, 2017  1:00 PM CDT   Return Visit with Arlet Gonzalez MD   [facility] ([facility]  [facility]    [address] [place] [address] [phone]              Future tests that were ordered for you today     Open Future Orders        Priority Expected Expires Ordered    XR Lumbar Spine 2/3 Views Routine 6/28/2017 5/11/2018 5/11/2017    CBC with platelets differential Routine 8/4/2017 8/23/2017 5/10/2017    Creatinine Routine 8/4/2017 8/23/2017 5/10/2017    Erythrocyte sedimentation rate auto Routine 8/4/2017 8/23/2017 5/10/2017    CRP inflammation Routine 8/4/2017 8/23/2017 5/10/2017    Hepatic panel Routine 8/4/2017 8/23/2017 5/10/2017            Who to contact     If you have questions or need follow up information about today's clinic visit or your schedule please contact [facility] directly at [phone].  Normal or non-critical lab and imaging results will be communicated to you by MyChart, letter or phone within 4 business days after the clinic has received the results. If you do not hear from us within 7 days, please contact the clinic through Catchoomhart or phone. If you have a critical or abnormal lab result, we will notify you by phone as soon as possible.  Submit refill requests through Rezolve or call your pharmacy and they will forward the refill request to us. Please allow 3 business days for your refill to be completed.          Additional Information About Your Visit        Catchoomhart Information     Rezolve gives you secure access to your electronic health record. If you see a primary care provider, you can also send messages to your care team and make appointments. If you have questions, please call your primary care clinic.  If you do not have a primary care provider, please call [phone] and they will assist you.        Care EveryWhere ID     This is your Care EveryWhere ID. This could be used by other organizations to access your [place] medical records  [id number]        Your Vitals Were     Pulse Temperature Pulse Oximetry Breastfeeding? BMI (Body  Mass Index)       83 97.1  F (36.2  C) (Oral) 93% No 30.25 kg/m2        Blood Pressure from Last 3 Encounters:   05/11/17 109/69   05/10/17 120/71   04/13/17 104/63    Weight from Last 3 Encounters:   05/11/17 186 lb (84.4 kg)   05/10/17 185 lb (83.9 kg)   05/10/17 185 lb 8 oz (84.1 kg)               Primary Care Provider Office Phone # Fax #    Georgia Vázquez [phone]       [facility] [address] [place] [address]        Thank you!     Thank you for choosing [facility]  for your care. Our goal is always to provide you with excellent care. Hearing back from our patients is one way we can continue to improve our services. Please take a few minutes to complete the written survey that you may receive in the mail after your visit with us. Thank you!             Your Updated Medication List - Protect others around you: Learn how to safely use, store and throw away your medicines at www.disposemymeds.org.          This list is accurate as of: 5/11/17  1:37 PM.  Always use your most recent med list.                   Brand Name Dispense Instructions for use    ascorbic acid 1000 MG Tabs tablet      Take 1 tablet by mouth 3 times daily       aspirin 81 MG tablet      Take 1 tablet by mouth daily.       atorvastatin 40 MG tablet    LIPITOR    90 tablet    TAKE ONE TABLET BY MOUTH EVERY DAY       CALCIUM CITRATE + PO      Take 2 tablets by mouth daily       cetirizine 10 MG tablet    zyrTEC     Take 10 mg by mouth daily       cyanocobalamin 500 MCG Tabs      Take 1 tablet by mouth 2 times daily       cyclobenzaprine 10 MG tablet    FLEXERIL    90 tablet    Take 1 tablet (10 mg) by mouth 3 times daily as needed for muscle spasms       ferrous gluconate 324 (38 FE) MG tablet    FERGON    270 tablet    Take 1 tablet (324 mg) by mouth 3 times daily (with meals)       fluticasone 50 MCG/ACT spray    FLONASE    16 g    Spray 2 sprays into both nostrils daily       folic  acid 1 MG tablet    FOLVITE    100 tablet    Take 1 tablet (1 mg) by mouth daily       magnesium oxide 400 (241.3 MG) MG tablet    MAG-OX     Take 1 tablet (400 mg) by mouth daily       methotrexate 2.5 MG tablet     108 tablet    Take 9 tablets (22.5 mg) by mouth once a week       Multi-vitamin Tabs tablet      Take 1 tablet by mouth daily       OMEGA-3 FISH OIL PO      Take 2,000 mg by mouth daily       order for DME      Respironics REMSTAR 60 Series Auto CPAP 12-15 cm H2O, Wisp nasal mask w/a s/m cushion       oxyCODONE 5 MG IR tablet    ROXICODONE    75 tablet    Take 1-2 tablets (5-10 mg) by mouth every 4 hours as needed for moderate to severe pain       pantoprazole 40 MG EC tablet    PROTONIX    90 tablet    TAKE ONE TABLET BY MOUTH ONCE DAILY 30-60 MINUTES BEFORE A MEAL       propylene glycol 0.6 % Soln ophthalmic solution    SYSTANE BALANCE     Place 1 drop into both eyes 4 times daily Reported on 5/10/2017       tofacitinib 11 MG 24 hr tablet    XELJANZ XR    90 tablet    Take 1 tablet (11 mg) by mouth daily       * topiramate 25 MG tablet    TOPAMAX     Take 25 mg by mouth every morning       * topiramate 25 MG tablet    TOPAMAX     Take 50 mg by mouth At Bedtime Reported on 5/10/2017       TYLENOL 500 MG tablet   Generic drug:  acetaminophen     100 tablet    Take 2 tablets (1,000 mg) by mouth every 8 hours as needed for pain       vitamin B complex with vitamin C Tabs tablet      Take 1 tablet by mouth daily.       VITAMIN D-3 PO      Take 1,000 Units by mouth 2 times daily       VITAMIN E PO      Take 2,000 Units by mouth daily       ZOLOFT 25 MG tablet   Generic drug:  sertraline      Take 25 mg by mouth daily       zolpidem 5 MG tablet    AMBIEN    30 tablet    Take 1 tablet (5 mg) by mouth nightly as needed For further refills please follow up with primary care physician       * Notice:  This list has 2 medication(s) that are the same as other medications prescribed for you. Read the directions  carefully, and ask your doctor or other care provider to review them with you.

## 2017-05-22 ENCOUNTER — TRANSFERRED RECORDS (OUTPATIENT)
Dept: HEALTH INFORMATION MANAGEMENT | Facility: CLINIC | Age: 77
End: 2017-05-22

## 2017-05-22 LAB — PHQ9 SCORE: 2

## 2017-05-25 NOTE — PROGRESS NOTES
"  SUBJECTIVE:                                                    Ginger Marshall is a 76 year old female who presents to clinic today for the following health issues:      Concern - ***     Onset: ***    Description:   ***    Intensity: {.:261698}    Progression of Symptoms:  {.:582940}    Accompanying Signs & Symptoms:  ***       Previous history of similar problem:   ***    Precipitating factors:   Worsened by: ***    Alleviating factors:  Improved by: ***       Therapies Tried and outcome: ***      {additional problems for provider to add:647390}    Problem list and histories reviewed & adjusted, as indicated.  Additional history: {NONE - AS DOCUMENTED:375057::\"as documented\"}    {HIST REVIEW/ LINKS 2:543292}    Reviewed and updated as needed this visit by clinical staff       Reviewed and updated as needed this visit by Provider         {PROVIDER CHARTING PREFERENCE:276675}  "

## 2017-05-26 ENCOUNTER — HEALTH MAINTENANCE LETTER (OUTPATIENT)
Age: 77
End: 2017-05-26

## 2017-05-26 ENCOUNTER — OFFICE VISIT (OUTPATIENT)
Dept: FAMILY MEDICINE | Facility: CLINIC | Age: 77
End: 2017-05-26
Payer: COMMERCIAL

## 2017-05-26 VITALS
HEART RATE: 90 BPM | OXYGEN SATURATION: 95 % | WEIGHT: 186 LBS | DIASTOLIC BLOOD PRESSURE: 66 MMHG | SYSTOLIC BLOOD PRESSURE: 130 MMHG | TEMPERATURE: 97.5 F | BODY MASS INDEX: 30.25 KG/M2

## 2017-05-26 DIAGNOSIS — G47.00 INSOMNIA, UNSPECIFIED TYPE: Primary | ICD-10-CM

## 2017-05-26 DIAGNOSIS — F51.04 PSYCHOPHYSIOLOGIC INSOMNIA: ICD-10-CM

## 2017-05-26 PROCEDURE — 99213 OFFICE O/P EST LOW 20 MIN: CPT | Performed by: NURSE PRACTITIONER

## 2017-05-26 ASSESSMENT — PAIN SCALES - GENERAL: PAINLEVEL: NO PAIN (0)

## 2017-05-26 NOTE — TELEPHONE ENCOUNTER
Zolpidem 5 mg tabs      Last Written Prescription Date:  04/14/17  Last Fill Quantity: 30,   # refills: 0  [facility] Office Visit with [facility] prescribing provider: 05/26/27  Future Office visit:    Next 5 appointments (look out 90 days)     Jun 07, 2017 11:00 AM CDT   Nurse Only with [facility] ANCILLARY   [facility] ([facility])    [address] [facility] [address]   [phone]            Jun 22, 2017 11:40 AM CDT   Return Visit with Carmen Fierro NP   [facility] ([facility])    [address] [facility] [address]   [phone]            Aug 09, 2017 10:40 AM CDT   Return Visit with Nico Byers MD   [facility] ([facility])    [address] [facility] [address]   [phone]                   Routing refill request to provider for review/approval because:  Drug not on the [facility] or [facility] refill protocol or controlled substance    Thank you!  Eva Marti  [facility] [employer]  On behalf of [facility]

## 2017-05-26 NOTE — PROGRESS NOTES
SUBJECTIVE:                                                    Ginger Marshall is a 76 year old female who presents to clinic today for the following health issues:      Chief Complaint   Patient presents with     Sleep Problem     Ambien was stopped due to insurance.  Would like to discuss a new medication     Patient is unable to fill ambien with her insurance anymore.  She is unsure why.  She has tried rozerem without any improvement in sleep, trazodone without any improvement in sleep.  Patient has difficulty falling asleep and staying asleep.  She has SHERRY and is on CPAP and this keeps her awake.  Patient has not yet tried to fill her ambien again.    Problem list and histories reviewed & adjusted, as indicated.  Additional history: as documented    Patient Active Problem List   Diagnosis     RA (Rheumatoid Arthritis) off Plaquenil     Menopause     History of colonic polyps     Mitral Regurgitation     Multinodular goiter     CARDIOVASCULAR SCREENING; LDL GOAL LESS THAN 130     Psychophysiologic insomnia     Pulmonary nodule     PSEUDOPHAKIA OU     PVD (POSTERIOR VITREOUS DETACHMENT) OU     PXF (PSEUDOEXFOLIATION OF LENS CAPSULE) OD     GERD (gastroesophageal reflux disease)     Mild major depression (H)     Hyperlipidemia LDL goal <100     Advance Care Planning     Neuropathy (H)     SHERRY (obstructive sleep apnea)-severe (AHI 35)     zEncounter for counseling     Anemia of chronic disease     Migraine     Osteoporosis     Cornea guttata, ou     Conjunctival concretions     Stenosis, spinal, lumbar     Other chronic pain     Obesity     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     MGD (meibomian gland dysfunction)     Blepharitis of both eyes     Personal history of healed osteoporosis fracture     Iron malabsorption     Hyperglycemia     Chronic bilateral low back pain without sciatica     Allergic state, subsequent encounter     BMI 32.0-32.9,adult     Spinal stenosis of lumbar region without  neurogenic claudication     Herniated nucleus pulposus, L3-4     S/P lumbar fusion     Past Surgical History:   Procedure Laterality Date     ABDOMEN SURGERY      c-sections     ARTHROSCOPY KNEE RT/LT      left     BACK SURGERY      disc     BREAST BIOPSY, RT/LT      left benign     BREAST SURGERY  's    lumpectomy     C ANESTH,TOTAL HIP ARTHROPLASTY      Rt hip     C HAND/FINGER SURGERY UNLISTED      right hand     C NONSPECIFIC PROCEDURE  91    left foot surgery     C NONSPECIFIC PROCEDURE  95    R MCP surgery     C TOTAL KNEE ARTHROPLASTY      left     C/SECTION, LOW TRANSVERSE  66,72    x 2     CATARACT IOL, RT/LT       COLONOSCOPY  ,     EYE SURGERY      cataracs     HC ESOPH/GAS REFLUX TEST W NASAL IMPED >1 HR  2012    Procedure:ESOPHAGEAL IMPEDENCE FUNCTION TEST WITH 24 HOUR PH GREATER THAN 1 HOUR; Surgeon:KAYKAY JULIO; Location:[facility]     IR CAUDAL EPIDURAL INJECTION SINGLE  2012    LESI L5-S1 at [facility]     OPTICAL TRACKING SYSTEM FUSION POSTERIOR SPINE LUMBAR N/A 4/3/2017    Procedure: OPTICAL TRACKING SYSTEM FUSION SPINE POSTERIOR LUMBAR ONE LEVEL;  Surgeon: Anthony Michele MD;  Location: [facility] OR     SURGICAL HISTORY OF -       right knee total replacement       Social History   Substance Use Topics     Smoking status: Former Smoker     Packs/day: 1.00     Years: 41.00     Types: Cigarettes     Quit date: 1999     Smokeless tobacco: Never Used     Alcohol use 0.0 oz/week      Comment: Periodically.     Family History   Problem Relation Age of Onset     Arthritis Mother      Alzheimer Disease Mother      Hyperlipidemia Mother      OSTEOPOROSIS Mother      HEART DISEASE Father      MI ( from this)     Alcohol/Drug Father      Arthritis Sister      Hypertension Sister      CANCER Son      DIABETES Son      Neurologic Disorder Sister      Schizophrenic     Hypertension Sister      Hyperlipidemia Sister      MENTAL ILLNESS Sister      DIABETES  Son      Other Cancer Son          Current Outpatient Prescriptions   Medication Sig Dispense Refill     tofacitinib (XELJANZ XR) 11 MG 24 hr tablet Take 1 tablet (11 mg) by mouth daily 90 tablet 3     methotrexate 2.5 MG tablet Take 9 tablets (22.5 mg) by mouth once a week 108 tablet 1     cyclobenzaprine (FLEXERIL) 10 MG tablet Take 1 tablet (10 mg) by mouth 3 times daily as needed for muscle spasms 90 tablet 1     oxyCODONE (ROXICODONE) 5 MG IR tablet Take 1-2 tablets (5-10 mg) by mouth every 4 hours as needed for moderate to severe pain 75 tablet 0     cetirizine (ZYRTEC) 10 MG tablet Take 10 mg by mouth daily       topiramate (TOPAMAX) 25 MG tablet Take 25 mg by mouth every morning       topiramate (TOPAMAX) 25 MG tablet Take 50 mg by mouth At Bedtime Reported on 5/10/2017       sertraline (ZOLOFT) 25 MG tablet Take 25 mg by mouth daily        atorvastatin (LIPITOR) 40 MG tablet TAKE ONE TABLET BY MOUTH EVERY DAY 90 tablet 2     pantoprazole (PROTONIX) 40 MG enteric coated tablet TAKE ONE TABLET BY MOUTH ONCE DAILY 30-60 MINUTES BEFORE A MEAL 90 tablet 1     order for [facility] Respironics REMSTAR 60 Series Auto CPAP 12-15 cm H2O, Wisp nasal mask w/a s/m cushion       VITAMIN E PO Take 2,000 Units by mouth daily        Omega-3 Fatty Acids (OMEGA-3 FISH OIL PO) Take 2,000 mg by mouth daily        folic acid (FOLVITE) 1 MG tablet Take 1 tablet (1 mg) by mouth daily 100 tablet 3     multivitamin, therapeutic with minerals (MULTI-VITAMIN) TABS Take 1 tablet by mouth daily       magnesium oxide (MAG-OX) 400 (241.3 MG) MG tablet Take 1 tablet (400 mg) by mouth daily       propylene glycol (SYSTANE BALANCE) 0.6 % SOLN ophthalmic solution Place 1 drop into both eyes 4 times daily Reported on 5/10/2017       ferrous gluconate (FERGON) 324 (38 FE) MG tablet Take 1 tablet (324 mg) by mouth 3 times daily (with meals) 270 tablet 1     cyanocobalamin 500 MCG TABS Take 1 tablet by mouth 2 times daily       Calcium Citrate-Vitamin D  "(CALCIUM CITRATE + PO) Take 2 tablets by mouth daily       Cholecalciferol (VITAMIN D-3 PO) Take 1,000 Units by mouth 2 times daily       Ascorbic Acid 1000 MG TABS Take 1 tablet by mouth 3 times daily       acetaminophen (TYLENOL) 500 MG tablet Take 2 tablets (1,000 mg) by mouth every 8 hours as needed for pain 100 tablet 0     vitamin  B complex with vitamin C (VITAMIN  B COMPLEX) TABS Take 1 tablet by mouth daily.  0     aspirin 81 MG tablet Take 1 tablet by mouth daily.       zolpidem (AMBIEN) 5 MG tablet Take 1 tablet (5 mg) by mouth nightly as needed For further refills please follow up with primary care physician (Patient not taking: Reported on 5/26/2017) 30 tablet 0     fluticasone (FLONASE) 50 MCG/ACT nasal spray Spray 2 sprays into both nostrils daily (Patient not taking: Reported on 5/26/2017) 16 g 3     [DISCONTINUED] methotrexate 2.5 MG tablet Take 10 tablets (25 mg) by mouth once a week .  Split dose between AM and PM (5 tabs in the morning, and 5 tabs in the evening; all taken within the same day each week) 120 tablet 1     Allergies   Allergen Reactions     Abatacept      Severe headaches     Adhesive Tape      Plastic tape     Celebrex [Celecoxib]      Ineffective     Ethanol      Antihistamines     Orencia [Abatacept]      Headache     Septra [Bactrim]      Sulfa Drugs      \"deathly ill\"     Tramadol      Headache     BP Readings from Last 3 Encounters:   05/26/17 130/66   05/11/17 109/69   05/10/17 120/71    Wt Readings from Last 3 Encounters:   05/26/17 186 lb (84.4 kg)   05/11/17 186 lb (84.4 kg)   05/10/17 185 lb (83.9 kg)                  Labs reviewed in EPIC    Reviewed and updated as needed this visit by clinical staff  Tobacco  Allergies  Med Hx  Surg Hx  Fam Hx  Soc Hx      Reviewed and updated as needed this visit by Provider         ROS:  Constitutional, HEENT, cardiovascular, pulmonary, gi and gu systems are negative, except as otherwise noted.    OBJECTIVE:                       "                              /66  Pulse 90  Temp 97.5  F (36.4  C) (Oral)  Wt 186 lb (84.4 kg)  SpO2 95%  BMI 30.25 kg/m2  Body mass index is 30.25 kg/(m^2).  GENERAL: healthy, alert and no distress  RESP: lungs clear to auscultation - no rales, rhonchi or wheezes  CV: regular rate and rhythm, normal S1 S2, no S3 or S4, no murmur, click or rub, no peripheral edema and peripheral pulses strong  MS: no gross musculoskeletal defects noted, no edema    Diagnostic Test Results:  none      ASSESSMENT/PLAN:                                                      1. Insomnia, unspecified type  Patient will try to fill ambien.  If not able to fill, will try prior auth.  Consider trying sonata, lunesta, and remeron in the future if not able to get approved.      FUTURE APPOINTMENTS:       - Follow-up for annual visit or as needed    AKIRA Jones CNP  [facility]

## 2017-05-26 NOTE — NURSING NOTE
"Chief Complaint   Patient presents with     Sleep Problem     Ambien was stopped due to insurance.  Would like to discuss a new medication       Initial /66  Pulse 90  Temp 97.5  F (36.4  C) (Oral)  Wt 186 lb (84.4 kg)  SpO2 95%  BMI 30.25 kg/m2 Estimated body mass index is 30.25 kg/(m^2) as calculated from the following:    Height as of 5/10/17: 5' 5.75\" (1.67 m).    Weight as of this encounter: 186 lb (84.4 kg).  Medication Reconciliation: complete   Madhavi Morales MA    "

## 2017-05-26 NOTE — MR AVS SNAPSHOT
After Visit Summary   5/26/2017    Ginger Marshall    MRN: [id number]           Patient Information     Date Of Birth          1940        Visit Information        Provider Department      5/26/2017 9:40 AM Dahiana Flores APRN [facility]        Today's Diagnoses     Insomnia, unspecified type    -  1      Care Instructions    [facility]    If you have any questions regarding to your visit please contact your care team:     Team Pink:   Clinic Hours Telephone Number   Internal Medicine:  Dr. Georgia Flores, NP       7am-7pm  Monday - Thursday   7am-5pm  Fridays  [phone]  (Appointment scheduling available 24/7)    Questions about your visit?  Team Line  [phone]   Urgent Care - [place] [facility] - 11am-9pm Monday-Friday Saturday-Sunday- 9am-5pm   [place] - 5pm-9pm Monday-Friday Saturday-Sunday- 9am-5pm  [phone] - [facility]  [phone] - [place]       What options do I have for visits at the clinic other than the traditional office visit?  To expand how we care for you, many of our providers are utilizing electronic visits (e-visits) and telephone visits, when medically appropriate, for interactions with their patients rather than a visit in the clinic.   We also offer nurse visits for many medical concerns. Just like any other service, we will bill your insurance company for this type of visit based on time spent on the phone with your provider. Not all insurance companies cover these visits. Please check with your medical insurance if this type of visit is covered. You will be responsible for any charges that are not paid by your insurance.      E-visits via OfficialVirtualDJ:  generally incur a $35.00 fee.  Telephone visits:  Time spent on the phone: *charged based on time that is spent on the phone in increments of 10 minutes. Estimated cost:   5-10 mins $30.00   11-20 mins. $59.00    21-30 mins. $85.00   Use SafetyTatt (secure email communication and access to your chart) to send your primary care provider a message or make an appointment. Ask someone on your Team how to sign up for [employer].    For a Price Quote for your services, please call our Consumer Price Line at [phone].    As always, Thank you for trusting us with your health care needs!    Discharge by RAMIREZ BOSS             Follow-ups after your visit        Your next 10 appointments already scheduled     Jun 07, 2017 11:00 AM CDT   Nurse Only with FZ ANCILLARY   [facility]    [address] [facility] [address]   [phone]            Jun 22, 2017 11:40 AM CDT   Return Visit with Carmen Fierro NP   [facility]    [address] [facility] [address]   [phone]            Aug 04, 2017 10:30 AM CDT   LAB with FZ LAB   [facility]    [address] [facility] [address]   [phone]           Patient must bring picture ID.  Patient should be prepared to give a urine specimen  Please do not eat 10-12 hours before your appointment if you are coming in fasting for labs on lipids, cholesterol, or glucose (sugar).  Pregnant women should follow their Care Team instructions. Water with medications is okay. Do not drink coffee or other fluids.   If you have concerns about taking  your medications, please ask at office or if scheduling via [employer], send a message by clicking on Secure Messaging, Message Your Care Team.            Aug 09, 2017 10:40 AM CDT   Return Visit with Nico Byers MD   [facility]    [address] [facility] [address]   [phone]            Sep 25, 2017  1:00 PM CDT   Return Visit with Arlet Gonzalez MD   [facility]    [address] [place] [address]  [phone]              Who to contact     If you have questions or need follow up information about today's clinic visit or your schedule please contact [facility] directly at [phone].  Normal or non-critical lab and imaging results will be communicated to you by MyChart, letter or phone within 4 business days after the clinic has received the results. If you do not hear from us within 7 days, please contact the clinic through Zhui Xin[employer] or phone. If you have a critical or abnormal lab result, we will notify you by phone as soon as possible.  Submit refill requests through Genter[employer] or call your pharmacy and they will forward the refill request to us. Please allow 3 business days for your refill to be completed.          Additional Information About Your Visit        Zhui Xin[employer]OneShift Information     G[employer] gives you secure access to your electronic health record. If you see a primary care provider, you can also send messages to your care team and make appointments. If you have questions, please call your primary care clinic.  If you do not have a primary care provider, please call [phone] and they will assist you.        Care EveryWhere ID     This is your Care EveryWhere ID. This could be used by other organizations to access your [facility] medical records  [id number]        Your Vitals Were     Pulse Temperature Pulse Oximetry BMI (Body Mass Index)          90 97.5  F (36.4  C) (Oral) 95% 30.25 kg/m2         Blood Pressure from Last 3 Encounters:   05/26/17 130/66   05/11/17 109/69   05/10/17 120/71    Weight from Last 3 Encounters:   05/26/17 186 lb (84.4 kg)   05/11/17 186 lb (84.4 kg)   05/10/17 185 lb (83.9 kg)              Today, you had the following     No orders found for display       Primary Care Provider Office Phone # Fax #    Georgia Vázquez [phone]       [address] [address]        Thank you!     Thank you  for choosing [facility]  for your care. Our goal is always to provide you with excellent care. Hearing back from our patients is one way we can continue to improve our services. Please take a few minutes to complete the written survey that you may receive in the mail after your visit with us. Thank you!             Your Updated Medication List - Protect others around you: Learn how to safely use, store and throw away your medicines at www.disposemymeds.org.          This list is accurate as of: 5/26/17 10:20 AM.  Always use your most recent med list.                   Brand Name Dispense Instructions for use    ascorbic acid 1000 MG Tabs tablet      Take 1 tablet by mouth 3 times daily       aspirin 81 MG tablet      Take 1 tablet by mouth daily.       atorvastatin 40 MG tablet    LIPITOR    90 tablet    TAKE ONE TABLET BY MOUTH EVERY DAY       CALCIUM CITRATE + PO      Take 2 tablets by mouth daily       cetirizine 10 MG tablet    zyrTEC     Take 10 mg by mouth daily       cyanocobalamin 500 MCG Tabs      Take 1 tablet by mouth 2 times daily       cyclobenzaprine 10 MG tablet    FLEXERIL    90 tablet    Take 1 tablet (10 mg) by mouth 3 times daily as needed for muscle spasms       ferrous gluconate 324 (38 FE) MG tablet    FERGON    270 tablet    Take 1 tablet (324 mg) by mouth 3 times daily (with meals)       fluticasone 50 MCG/ACT spray    FLONASE    16 g    Spray 2 sprays into both nostrils daily       folic acid 1 MG tablet    FOLVITE    100 tablet    Take 1 tablet (1 mg) by mouth daily       magnesium oxide 400 (241.3 MG) MG tablet    MAG-OX     Take 1 tablet (400 mg) by mouth daily       methotrexate 2.5 MG tablet     108 tablet    Take 9 tablets (22.5 mg) by mouth once a week       Multi-vitamin Tabs tablet      Take 1 tablet by mouth daily       OMEGA-3 FISH OIL PO      Take 2,000 mg by mouth daily       order for DME      Respironics REMSTAR 60 Series Auto CPAP 12-15 cm H2O, Wisp nasal mask  w/a s/m cushion       oxyCODONE 5 MG IR tablet    ROXICODONE    75 tablet    Take 1-2 tablets (5-10 mg) by mouth every 4 hours as needed for moderate to severe pain       pantoprazole 40 MG EC tablet    PROTONIX    90 tablet    TAKE ONE TABLET BY MOUTH ONCE DAILY 30-60 MINUTES BEFORE A MEAL       propylene glycol 0.6 % Soln ophthalmic solution    SYSTANE BALANCE     Place 1 drop into both eyes 4 times daily Reported on 5/10/2017       tofacitinib 11 MG 24 hr tablet    XELJANZ XR    90 tablet    Take 1 tablet (11 mg) by mouth daily       * topiramate 25 MG tablet    TOPAMAX     Take 25 mg by mouth every morning       * topiramate 25 MG tablet    TOPAMAX     Take 50 mg by mouth At Bedtime Reported on 5/10/2017       TYLENOL 500 MG tablet   Generic drug:  acetaminophen     100 tablet    Take 2 tablets (1,000 mg) by mouth every 8 hours as needed for pain       vitamin B complex with vitamin C Tabs tablet      Take 1 tablet by mouth daily.       VITAMIN D-3 PO      Take 1,000 Units by mouth 2 times daily       VITAMIN E PO      Take 2,000 Units by mouth daily       ZOLOFT 25 MG tablet   Generic drug:  sertraline      Take 25 mg by mouth daily       zolpidem 5 MG tablet    AMBIEN    30 tablet    Take 1 tablet (5 mg) by mouth nightly as needed For further refills please follow up with primary care physician       * Notice:  This list has 2 medication(s) that are the same as other medications prescribed for you. Read the directions carefully, and ask your doctor or other care provider to review them with you.

## 2017-05-26 NOTE — LETTER
My Depression Action Plan  Name: Ginger Marshall   Date of Birth 1940  Date: 5/26/2017    My doctor: Georgia Vázquez   My clinic: [address]  [place] [address]  [phone]          GREEN    ZONE   Good Control    What it looks like:     Things are going generally well. You have normal up s and down s. You may even feel depressed from time to time, but bad moods usually last less than a day.   What you need to do:  1. Continue to care for yourself (see self care plan)  2. Check your depression survival kit and update it as needed  3. Follow your physician s recommendations including any medication.  4. Do not stop taking medication unless you consult with your physician first.           YELLOW         ZONE Getting Worse    What it looks like:     Depression is starting to interfere with your life.     It may be hard to get out of bed; you may be starting to isolate yourself from others.    Symptoms of depression are starting to last most all day and this has happened for several days.     You may have suicidal thoughts but they are not constant.   What you need to do:     1. Call your care team, your response to treatment will improve if you keep your care team informed of your progress. Yellow periods are signs an adjustment may need to be made.     2. Continue your self-care, even if you have to fake it!    3. Talk to someone in your support network    4. Open up your depression survival kit           RED    ZONE Medical Alert - Get Help    What it looks like:     Depression is seriously interfering with your life.     You may experience these or other symptoms: You can t get out of bed most days, can t work or engage in other necessary activities, you have trouble taking care of basic hygiene, or basic responsibilities, thoughts of suicide or death that will not go away, self-injurious behavior.     What you need to do:  1. Call your care team and  request a same-day appointment. If they are not available (weekends or after hours) call your local crisis line, emergency room or 911.      Electronically signed by: Madhavi Morales, May 26, 2017    Depression Self Care Plan / Survival Kit    Self-Care for Depression  Here s the deal. Your body and mind are really not as separate as most people think.  What you do and think affects how you feel and how you feel influences what you do and think. This means if you do things that people who feel good do, it will help you feel better.  Sometimes this is all it takes.  There is also a place for medication and therapy depending on how severe your depression is, so be sure to consult with your medical provider and/ or Behavioral Health Consultant if your symptoms are worsening or not improving.     In order to better manage my stress, I will:    Exercise  Get some form of exercise, every day. This will help reduce pain and release endorphins, the  feel good  chemicals in your brain. This is almost as good as taking antidepressants!  This is not the same as joining a gym and then never going! (they count on that by the way ) It can be as simple as just going for a walk or doing some gardening, anything that will get you moving.      Hygiene   Maintain good hygiene (Get out of bed in the morning, Make your bed, Brush your teeth, Take a shower, and Get dressed like you were going to work, even if you are unemployed).  If your clothes don't fit try to get ones that do.    Diet  I will strive to eat foods that are good for me, drink plenty of water, and avoid excessive sugar, caffeine, alcohol, and other mood-altering substances.  Some foods that are helpful in depression are: complex carbohydrates, B vitamins, flaxseed, fish or fish oil, fresh fruits and vegetables.    Psychotherapy  I agree to participate in Individual Therapy (if recommended).    Medication  If prescribed medications, I agree to take them.  Missing doses  can result in serious side effects.  I understand that drinking alcohol, or other illicit drug use, may cause potential side effects.  I will not stop my medication abruptly without first discussing it with my provider.    Staying Connected With Others  I will stay in touch with my friends, family members, and my primary care provider/team.    Use your imagination  Be creative.  We all have a creative side; it doesn t matter if it s oil painting, sand castles, or mud pies! This will also kick up the endorphins.    Witness Beauty  (AKA stop and smell the roses) Take a look outside, even in mid-winter. Notice colors, textures. Watch the squirrels and birds.     Service to others  Be of service to others.  There is always someone else in need.  By helping others we can  get out of ourselves  and remember the really important things.  This also provides opportunities for practicing all the other parts of the program.    Humor  Laugh and be silly!  Adjust your TV habits for less news and crime-drama and more comedy.    Control your stress  Try breathing deep, massage therapy, biofeedback, and meditation. Find time to relax each day.     My support system    Clinic Contact:  Phone number:    Contact 1:  Phone number:    Contact 2:  Phone number:    Latter day/:  Phone number:    Therapist:  Phone number:    Local crisis center:    Phone number:    Other community support:  Phone number:

## 2017-05-29 RX ORDER — ZOLPIDEM TARTRATE 5 MG/1
5 TABLET ORAL
Qty: 30 TABLET | Refills: 0 | Status: SHIPPED | OUTPATIENT
Start: 2017-05-29 | End: 2017-06-16

## 2017-05-30 ENCOUNTER — MYC MEDICAL ADVICE (OUTPATIENT)
Dept: NEUROSURGERY | Facility: CLINIC | Age: 77
End: 2017-05-30

## 2017-05-30 DIAGNOSIS — K21.9 GASTROESOPHAGEAL REFLUX DISEASE WITHOUT ESOPHAGITIS: ICD-10-CM

## 2017-05-30 NOTE — TELEPHONE ENCOUNTER
Ambien prescription signed by Dr. Vázquez and faxed to [facility] pharmacy at [phone].  Kathleen Hwang,

## 2017-05-30 NOTE — TELEPHONE ENCOUNTER
Spoke to patient. She is s/p lumbar fusion 4/3/17. Patient reports her incision is not healing. She has a small spot at her incision site that is not scabbed over. However, she denies any drainage, swelling, tenderness, redness, or fever. She did say that 2 days ago her friend was checking her incision and trimmed what felt like a long string. Patient unsure of exact measurement. Informed patient that may be a dissolvable suture but will consult with Carmen Fierro CNP for recommendation.

## 2017-05-30 NOTE — TELEPHONE ENCOUNTER
Carmen recommends patient be seen if she has drainage, or incision is opening, or other concerns. Otherwise, continue to monitor and f/u as scheduled on 6/22/17. Called and informed patient. Reviewed s/sx of infection with patient and to call with any other questions or concerns. Patient verbalized understanding.

## 2017-05-31 RX ORDER — PANTOPRAZOLE SODIUM 40 MG/1
TABLET, DELAYED RELEASE ORAL
Qty: 90 TABLET | Refills: 1 | Status: SHIPPED | OUTPATIENT
Start: 2017-05-31 | End: 2017-11-24

## 2017-05-31 NOTE — TELEPHONE ENCOUNTER
pantoprazole (PROTONIX) 40 MG enteric coated tablet      Last Written Prescription Date: 11/22/16  Last Fill Quantity: 90,  # refills: 1   Last Office Visit with [facility], [facility] or [facility] prescribing provider: 5/26/17                                         Next 5 appointments (look out 90 days)     Jun 07, 2017 11:00 AM CDT   Nurse Only with FZ ANCILLARY   [facility] [place] ([facility])    [address] [facility]  [place] [address]   [phone]            Jun 22, 2017 11:40 AM CDT   Return Visit with Carmen Fierro NP   [facility] ([facility])    [address] [facility] [address]   [phone]            Aug 09, 2017 10:40 AM CDT   Return Visit with Nico Byers MD   [facility] ([facility])    [address]  [place] [address]   [phone]

## 2017-05-31 NOTE — TELEPHONE ENCOUNTER
Routing refill request to provider for review/approval because:  Drug interaction warning      High Drug-Drug: methotrexate (Anti-Rheumatic) and pantoprazole  Proton pump inhibitors may decrease the renal elimination of Methotrexate and potentially increase toxicity related to Methotrexate, especially in patients receiving high-dose intravenous methotrexate for the treatment of cancer.   Details         Phyllis Burris RN - BC

## 2017-06-16 ENCOUNTER — MYC MEDICAL ADVICE (OUTPATIENT)
Dept: INTERNAL MEDICINE | Facility: CLINIC | Age: 77
End: 2017-06-16

## 2017-06-16 DIAGNOSIS — F51.04 PSYCHOPHYSIOLOGIC INSOMNIA: ICD-10-CM

## 2017-06-16 DIAGNOSIS — E66.09 NON MORBID OBESITY DUE TO EXCESS CALORIES: Primary | Chronic | ICD-10-CM

## 2017-06-16 RX ORDER — TOPIRAMATE 25 MG/1
50 TABLET, FILM COATED ORAL 2 TIMES DAILY
Qty: 360 TABLET | Refills: 3 | Status: SHIPPED | OUTPATIENT
Start: 2017-06-16 | End: 2018-06-22

## 2017-06-16 RX ORDER — ZOLPIDEM TARTRATE 5 MG/1
5 TABLET ORAL
Qty: 90 TABLET | Refills: 1 | Status: SHIPPED | OUTPATIENT
Start: 2017-06-16 | End: 2017-12-14

## 2017-06-16 NOTE — TELEPHONE ENCOUNTER
Faxed prescription to pharmacy and sent a [employer] message to patient that this was done.  Sona Givens,

## 2017-06-20 PROBLEM — F41.9 ANXIETY: Status: ACTIVE | Noted: 2017-06-20

## 2017-06-22 ENCOUNTER — OFFICE VISIT (OUTPATIENT)
Dept: NEUROSURGERY | Facility: CLINIC | Age: 77
End: 2017-06-22
Payer: COMMERCIAL

## 2017-06-22 ENCOUNTER — RADIANT APPOINTMENT (OUTPATIENT)
Dept: GENERAL RADIOLOGY | Facility: CLINIC | Age: 77
End: 2017-06-22
Attending: NURSE PRACTITIONER
Payer: COMMERCIAL

## 2017-06-22 VITALS
OXYGEN SATURATION: 95 % | WEIGHT: 183 LBS | HEIGHT: 66 IN | HEART RATE: 77 BPM | SYSTOLIC BLOOD PRESSURE: 115 MMHG | BODY MASS INDEX: 29.41 KG/M2 | DIASTOLIC BLOOD PRESSURE: 72 MMHG | RESPIRATION RATE: 16 BRPM

## 2017-06-22 DIAGNOSIS — Z98.1 S/P LUMBAR FUSION: ICD-10-CM

## 2017-06-22 DIAGNOSIS — Z98.1 S/P LUMBAR FUSION: Primary | ICD-10-CM

## 2017-06-22 PROCEDURE — 99024 POSTOP FOLLOW-UP VISIT: CPT | Performed by: NURSE PRACTITIONER

## 2017-06-22 PROCEDURE — 72100 X-RAY EXAM L-S SPINE 2/3 VWS: CPT

## 2017-06-22 ASSESSMENT — PAIN SCALES - GENERAL: PAINLEVEL: NO PAIN (0)

## 2017-06-22 NOTE — PROGRESS NOTES
"[facility]  Neurosurgery followup:    HPI: Patient is s/p L3 Smith-Rodriguez osteotomy with L3-4 transforaminal lumbar interbody fusion with Dr. Anthony Michele on 4/3/17.  She returns today for her 3 month follow up. She states she continues to do well.  She describes an \"ache\" to the lower back and the anterior thighs that \"comes and goes.\"  She continues to take Flexeril prn with benefit.  Her pain increases with lying on her side, otherwise lying supine is comfortable.  She denies weakness to BLE or changes to bowel or bladder.    Exam:  Constitutional:  Alert, well nourished, NAD.  HEENT: Normocephalic, atraumatic.   Pulm:  Without shortness of breath   CV:  No pitting edema of BLE.      Neurological:  Awake  Alert  Oriented x 3  Motor exam:        IP Q DF PF EHL  R   5  5   5   5    5  L   5  5   5   5    5     Able to spontaneously move L/E bilaterally  Sensation intact throughout all L/E dermatomes     Incision: Clean, dry and intact; slight scab noted  Imaging: Per Xray fusion intact and stable    A/P: S/p L3 Smith-Rodriguez osteotomy with L3-4  TLIF    Patient is s/p L3 Smith-Rodriguez osteotomy with L3-4 transforaminal lumbar interbody fusion with Dr. Anthony Michele on 4/3/17.  She returns today for her 3 month follow up. She states she continues to do well.  She describes an \"ache\" to the lower back and the anterior thighs that \"comes and goes.\" We discussed normal expectations following surgery.  She has started increasing normal activities and has not had difficulties.    Patient Instructions   - May ease into normal activites  - followup in 3 months with xray prior   - Call the clinic at [phone] for increasing redness, swelling or pus draining from the incision, increased pain or any other questions and concerns.        Carmen Fierro [facility]  [facility]  [address] [address]    Tel [phone]  Pager " [phone]

## 2017-06-22 NOTE — MR AVS SNAPSHOT
After Visit Summary   6/22/2017    Ginger Marshall    MRN: [id number]           Patient Information     Date Of Birth          1940        Visit Information        Provider Department      6/22/2017 11:40 AM Carmen Fierro NP [facility] [place]        Today's Diagnoses     S/P lumbar fusion    -  1      Care Instructions    - May ease into normal activites  - followup in 3 months with xray prior   - Call the clinic at [phone] for increasing redness, swelling or pus draining from the incision, increased pain or any other questions and concerns.                Follow-ups after your visit        Your next 10 appointments already scheduled     Jun 22, 2017 11:40 AM CDT   Return Visit with Carmen Fierro NP   [facility] [place] ([facility])    [address] [address]   [phone]            Jul 14, 2017  1:30 PM CDT   New Visit with Aguila San MD   [facility] [place] ([facility])    [address]  [place] [address]   [phone]            Aug 04, 2017 10:30 AM CDT   LAB with [facility]   [facility] [place] ([facility]    [address] [address]   [phone]           Patient must bring picture ID.  Patient should be prepared to give a urine specimen  Please do not eat 10-12 hours before your appointment if you are coming in fasting for labs on lipids, cholesterol, or glucose (sugar).  Pregnant women should follow their Care Team instructions. Water with medications is okay. Do not drink coffee or other fluids.   If you have concerns about taking  your medications, please ask at office or if scheduling via [employer], send a message by clicking on Secure Messaging, Message Your Care Team.            Aug 09, 2017 10:40 AM CDT   Return Visit with Nico Byers MD   [facility] [place] ([facility])    [address]  [place] [address]  "  [phone]            Sep 25, 2017  1:00 PM CDT   [facility] Visit with Arlet Gonzalez MD   [facility]    [address] [address] [place] [address] [phone]              Future tests that were ordered for you today     Open Future Orders        Priority Expected Expires Ordered    XR Lumbar Spine 2/3 Views Routine 9/22/2017 6/22/2018 6/22/2017            Who to contact     If you have questions or need follow up information about today's clinic visit or your schedule please contact [facility] directly at [phone].  Normal or non-critical lab and imaging results will be communicated to you by [employer], letter or phone within 4 business days after the clinic has received the results. If you do not hear from us within 7 days, please contact the clinic through [employer] or phone. If you have a critical or abnormal lab result, we will notify you by phone as soon as possible.  Submit refill requests through [employer] or call your pharmacy and they will forward the refill request to us. Please allow 3 business days for your refill to be completed.          Additional Information About Your Visit        [employer] Information     [employer] gives you secure access to your electronic health record. If you see a primary care provider, you can also send messages to your care team and make appointments. If you have questions, please call your primary care clinic.  If you do not have a primary care provider, please call [phone] and they will assist you.        Care EveryWhere ID     This is your Care EveryWhere ID. This could be used by other organizations to access your [facility] medical records  [id number]        Your Vitals Were     Pulse Respirations Height Pulse Oximetry BMI (Body Mass Index)       77 16 5' 5.75\" (1.67 m) 95% 29.76 kg/m2        Blood Pressure from Last 3 Encounters:   06/22/17 115/72   05/26/17 130/66   05/11/17 109/69    " Weight from Last 3 Encounters:   06/22/17 183 lb (83 kg)   05/26/17 186 lb (84.4 kg)   05/11/17 186 lb (84.4 kg)               Primary Care Provider Office Phone # Fax #    Georgia Vázquez [phone]       [facility] [address] [place] [address]        Equal Access to Services     SHANIQUE WATSON : Hadii aad ku hadasho Soomaali, waaxda luqadaha, qaybta kaalmada adeegyada, waxay idiin hayaan adeeg khabdoulaye laLibbyaan . So [facility] [phone].    ATENCIÓN: Si habla español, tiene a herrera disposición servicios gratuitos de asistencia lingüística. Llame al [phone].    We comply with applicable federal civil rights laws and [place] laws. We do not discriminate on the basis of race, color, national origin, age, disability sex, sexual orientation or gender identity.            Thank you!     Thank you for choosing [facility]  for your care. Our goal is always to provide you with excellent care. Hearing back from our patients is one way we can continue to improve our services. Please take a few minutes to complete the written survey that you may receive in the mail after your visit with us. Thank you!             Your Updated Medication List - Protect others around you: Learn how to safely use, store and throw away your medicines at www.disposemymeds.org.          This list is accurate as of: 6/22/17 11:28 AM.  Always use your most recent med list.                   Brand Name Dispense Instructions for use Diagnosis    ascorbic acid 1000 MG Tabs tablet      Take 1 tablet by mouth 3 times daily        aspirin 81 MG tablet      Take 1 tablet by mouth daily.        atorvastatin 40 MG tablet    LIPITOR    90 tablet    TAKE ONE TABLET BY MOUTH EVERY DAY    Hyperlipidemia LDL goal <100       CALCIUM CITRATE + PO      Take 2 tablets by mouth daily        cetirizine 10 MG tablet    zyrTEC     Take 10 mg by mouth daily        cyanocobalamin 500 MCG Tabs      Take 1 tablet by mouth  2 times daily        cyclobenzaprine 10 MG tablet    FLEXERIL    90 tablet    Take 1 tablet (10 mg) by mouth 3 times daily as needed for muscle spasms    S/P lumbar fusion       ferrous gluconate 324 (38 FE) MG tablet    FERGON    270 tablet    Take 1 tablet (324 mg) by mouth 3 times daily (with meals)    Low iron       folic acid 1 MG tablet    FOLVITE    100 tablet    Take 1 tablet (1 mg) by mouth daily    Rheumatoid arthritis involving multiple sites with positive rheumatoid factor (H), High risk medications (not anticoagulants) long-term use       magnesium oxide 400 (241.3 MG) MG tablet    MAG-OX     Take 1 tablet (400 mg) by mouth daily        methotrexate 2.5 MG tablet     108 tablet    Take 9 tablets (22.5 mg) by mouth once a week    Rheumatoid arthritis involving multiple sites with positive rheumatoid factor (H), High risk medications (not anticoagulants) long-term use       Multi-vitamin Tabs tablet      Take 1 tablet by mouth daily        OMEGA-3 FISH OIL PO      Take 2,000 mg by mouth daily        order for [facility]      Respironics REMSTAR 60 Series Auto CPAP 12-15 cm H2O, Wisp nasal mask w/a s/m cushion        oxyCODONE 5 MG IR tablet    ROXICODONE    75 tablet    Take 1-2 tablets (5-10 mg) by mouth every 4 hours as needed for moderate to severe pain    S/P lumbar fusion       pantoprazole 40 MG EC tablet    PROTONIX    90 tablet    TAKE ONE TABLET BY MOUTH ONCE DAILY 30-60 MINUTES BEFORE A MEAL    Gastroesophageal reflux disease without esophagitis       propylene glycol 0.6 % Soln ophthalmic solution    SYSTANE BALANCE     Place 1 drop into both eyes 4 times daily Reported on 5/10/2017        tofacitinib 11 MG 24 hr tablet    XELJANZ XR    90 tablet    Take 1 tablet (11 mg) by mouth daily    Rheumatoid arthritis involving multiple sites with positive rheumatoid factor (H), High risk medications (not anticoagulants) long-term use       topiramate 25 MG tablet    TOPAMAX    360 tablet    Take 2 tablets (50  mg) by mouth 2 times daily    Non morbid obesity due to excess calories       TYLENOL 500 MG tablet   Generic drug:  acetaminophen     100 tablet    Take 2 tablets (1,000 mg) by mouth every 8 hours as needed for pain        vitamin B complex with vitamin C Tabs tablet      Take 1 tablet by mouth daily.        VITAMIN D-3 PO      Take 1,000 Units by mouth 2 times daily        VITAMIN E PO      Take 2,000 Units by mouth daily        ZOLOFT 25 MG tablet   Generic drug:  sertraline      Take 25 mg by mouth daily        zolpidem 5 MG tablet    AMBIEN    90 tablet    Take 1 tablet (5 mg) by mouth nightly as needed    Psychophysiologic insomnia

## 2017-06-22 NOTE — NURSING NOTE
"Ginger Marshall is a 77 year old female who presents for:  Chief Complaint   Patient presents with     Neurologic Problem     follow up L3-4 fusion 4/3/17, some aching pain but patient states she is doing better, has some pain in thoracic region        Initial Vitals:  /72 (BP Location: Right arm, Patient Position: Chair, Cuff Size: Adult Regular)  Pulse 77  Resp 16  Ht 5' 5.75\" (1.67 m)  Wt 183 lb (83 kg)  SpO2 95%  BMI 29.76 kg/m2 Estimated body mass index is 29.76 kg/(m^2) as calculated from the following:    Height as of this encounter: 5' 5.75\" (1.67 m).    Weight as of this encounter: 183 lb (83 kg).. Body surface area is 1.96 meters squared. BP completed using cuff size: regular  No Pain (0)    Do you feel safe in your environment?  Yes  Do you need any refills today? No    Nursing Comments: scar is not healing as fast as patient thinks it should, also has leg pain, taking the Flexeril for this        Candice Quinteros"

## 2017-06-22 NOTE — PATIENT INSTRUCTIONS
- May ease into normal activites  - followup in 3 months with xray prior   - Call the clinic at [phone] for increasing redness, swelling or pus draining from the incision, increased pain or any other questions and concerns.

## 2017-06-23 ENCOUNTER — TELEPHONE (OUTPATIENT)
Dept: FAMILY MEDICINE | Facility: CLINIC | Age: 77
End: 2017-06-23

## 2017-06-23 NOTE — TELEPHONE ENCOUNTER
Valentin Miles, I m Madhavi Morales. I work with Dr. Vázquez at [facility]. He/she noticed in your chart that you are due for a patient health questionnaire. We would like to complete that today as Dr. Vázquez cares about your health.    Next 5 appointments (look out 90 days)     Aug 09, 2017 10:40 AM CDT   Return Visit with Nico Byers MD   [facility] ([facility]    [address] [address] [phone]                  Called patient; Called patient, completed PHQ9. PHQ9 score: 0. (If patient has sucidal thoughts discuss with Team RN ASAP) <5 PASS, >5 FAIL Needs follow up appointment.  If patient refuses appointment please ask them if they would be willing to speak to the Team RN for further support over the phone.     If PHQ-9 is less than 5, close encounter. If PHQ-9 is 5 or greater, recommend that patient schedule follow up appointment with primary provider (in office, e-visit or phone visit) for medication follow up and evaluation. If positive suicidal thoughts, huddle with RN or provider today and route encounter.     Appointment Made? No    Madhavi Morales

## 2017-06-24 ASSESSMENT — PATIENT HEALTH QUESTIONNAIRE - PHQ9: SUM OF ALL RESPONSES TO PHQ QUESTIONS 1-9: 0

## 2017-06-26 ENCOUNTER — OFFICE VISIT (OUTPATIENT)
Dept: FAMILY MEDICINE | Facility: CLINIC | Age: 77
End: 2017-06-26
Payer: COMMERCIAL

## 2017-06-26 VITALS
TEMPERATURE: 97.9 F | SYSTOLIC BLOOD PRESSURE: 122 MMHG | BODY MASS INDEX: 29.44 KG/M2 | WEIGHT: 181 LBS | HEART RATE: 82 BPM | OXYGEN SATURATION: 96 % | DIASTOLIC BLOOD PRESSURE: 62 MMHG

## 2017-06-26 DIAGNOSIS — K52.9 GASTROENTERITIS: ICD-10-CM

## 2017-06-26 DIAGNOSIS — R07.0 THROAT PAIN: Primary | ICD-10-CM

## 2017-06-26 DIAGNOSIS — J02.9 ACUTE PHARYNGITIS, UNSPECIFIED ETIOLOGY: ICD-10-CM

## 2017-06-26 LAB
DEPRECATED S PYO AG THROAT QL EIA: NORMAL
MICRO REPORT STATUS: NORMAL
SPECIMEN SOURCE: NORMAL

## 2017-06-26 PROCEDURE — 87880 STREP A ASSAY W/OPTIC: CPT | Performed by: NURSE PRACTITIONER

## 2017-06-26 PROCEDURE — 99213 OFFICE O/P EST LOW 20 MIN: CPT | Performed by: NURSE PRACTITIONER

## 2017-06-26 PROCEDURE — 87081 CULTURE SCREEN ONLY: CPT | Performed by: NURSE PRACTITIONER

## 2017-06-26 RX ORDER — AZITHROMYCIN 250 MG/1
TABLET, FILM COATED ORAL
Qty: 6 TABLET | Refills: 0 | Status: SHIPPED | OUTPATIENT
Start: 2017-06-26 | End: 2017-12-14

## 2017-06-26 ASSESSMENT — PAIN SCALES - GENERAL: PAINLEVEL: MILD PAIN (2)

## 2017-06-26 NOTE — PATIENT INSTRUCTIONS
[facility] (formerly [facility])    If you have any questions regarding to your visit please contact your care team:     Team Pink:   Clinic Hours Telephone Number   Internal Medicine:  Dr. Georgia Flores NP       7am-7pm  Monday - Thursday   7am-5pm  Fridays  [phone]  (Appointment scheduling available 24/7)    Questions about your visit?  Team Line  [phone]   Urgent Care - [place] [facility] - 11am-9pm Monday-Friday Saturday-Sunday- 9am-5pm   [place] - 5pm-9pm Monday-Friday Saturday-Sunday- 9am-5pm  [phone] - [place]   [phone] - [place]       What options do I have for visits at the clinic other than the traditional office visit?  To expand how we care for you, many of our providers are utilizing electronic visits (e-visits) and telephone visits, when medically appropriate, for interactions with their patients rather than a visit in the clinic.   We also offer nurse visits for many medical concerns. Just like any other service, we will bill your insurance company for this type of visit based on time spent on the phone with your provider. Not all insurance companies cover these visits. Please check with your medical insurance if this type of visit is covered. You will be responsible for any charges that are not paid by your insurance.      E-visits via [email or web address]:  generally incur a $35.00 fee.  Telephone visits:  Time spent on the phone: *charged based on time that is spent on the phone in increments of 10 minutes. Estimated cost:   5-10 mins $30.00   11-20 mins. $59.00   21-30 mins. $85.00   Use [employer] (secure email communication and access to your chart) to send your primary care provider a message or make an appointment. Ask someone on your Team how to sign up for [email or web address].    For a Price Quote for your services, please call our Consumer Price Line at [phone].    As always, Thank you for trusting us with your health care  needs!    MONTSE

## 2017-06-26 NOTE — PROGRESS NOTES
SUBJECTIVE:                                                    Ginger Marshall is a 77 year old female who presents to clinic today for the following health issues:      Chief Complaint   Patient presents with     Throat Problem     x 10 days     Fatigue     Diarrhea     Vomiting     K.JASON/MA    Patient complains of sore throat for the past 10 days.  Patient denies other HEENT symptoms, fever, lymphadenopathy, shortness of breath, chest pain.  Patient complains of mild non-productive cough.    Patient also complains of feeling weak.  She had several episodes of vomiting and diarrhea over the weekend.  She denies any episodes today.  Patient is drinking and urinating per normal.  Patient complains of poor appetite.  Patient denies melena, hematochezia, abdominal pain.    Problem list and histories reviewed & adjusted, as indicated.  Additional history: as documented    Patient Active Problem List   Diagnosis     RA (Rheumatoid Arthritis) off Plaquenil     Menopause     History of colonic polyps     Mitral Regurgitation     Multinodular goiter     CARDIOVASCULAR SCREENING; LDL GOAL LESS THAN 130     Psychophysiologic insomnia     Pulmonary nodule     PSEUDOPHAKIA OU     PVD (POSTERIOR VITREOUS DETACHMENT) OU     PXF (PSEUDOEXFOLIATION OF LENS CAPSULE) OD     GERD (gastroesophageal reflux disease)     Mild major depression (H)     Hyperlipidemia LDL goal <100     Advance Care Planning     Neuropathy (H)     SHERRY (obstructive sleep apnea)-severe (AHI 35)     zEncounter for counseling     Anemia of chronic disease     Migraine     Osteoporosis     Cornea guttata, ou     Conjunctival concretions     Stenosis, spinal, lumbar     Other chronic pain     Obesity     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     MGD (meibomian gland dysfunction)     Blepharitis of both eyes     Personal history of healed osteoporosis fracture     Iron malabsorption     Hyperglycemia     Chronic bilateral low back pain without  sciatica     Allergic state, subsequent encounter     BMI 32.0-32.9,adult     Spinal stenosis of lumbar region without neurogenic claudication     Herniated nucleus pulposus, L3-4     S/P lumbar fusion     Anxiety     Past Surgical History:   Procedure Laterality Date     ABDOMEN SURGERY      c-sections     ARTHROSCOPY KNEE RT/LT      left     BACK SURGERY      disc     BREAST BIOPSY, RT/LT      left benign     BREAST SURGERY  90's    lumpectomy     C ANESTH,TOTAL HIP ARTHROPLASTY      Rt hip     C HAND/FINGER SURGERY UNLISTED      right hand     C NONSPECIFIC PROCEDURE  91    left foot surgery     C NONSPECIFIC PROCEDURE  95    R MCP surgery     C TOTAL KNEE ARTHROPLASTY      left     C/SECTION, LOW TRANSVERSE  66,72    x 2     CATARACT IOL, RT/LT       COLONOSCOPY  ,     EYE SURGERY      cataracs     HC ESOPH/GAS REFLUX TEST W NASAL IMPED >1 HR  2012    Procedure:ESOPHAGEAL IMPEDENCE FUNCTION TEST WITH 24 HOUR PH GREATER THAN 1 HOUR; Surgeon:KAYKAY JULIO; Location:[facility]     IR CAUDAL EPIDURAL INJECTION SINGLE  2012    [facility] L5-S1 at [facility]     OPTICAL TRACKING SYSTEM FUSION POSTERIOR SPINE LUMBAR N/A 4/3/2017    Procedure: OPTICAL TRACKING SYSTEM FUSION SPINE POSTERIOR LUMBAR ONE LEVEL;  Surgeon: Anthony Michele MD;  Location:  OR     SURGICAL HISTORY OF -       right knee total replacement       Social History   Substance Use Topics     Smoking status: Former Smoker     Packs/day: 1.00     Years: 41.00     Types: Cigarettes     Quit date: 1999     Smokeless tobacco: Never Used     Alcohol use 0.0 oz/week      Comment: Periodically.     Family History   Problem Relation Age of Onset     Arthritis Mother      Alzheimer Disease Mother      Hyperlipidemia Mother      OSTEOPOROSIS Mother      HEART DISEASE Father      MI ( from this)     Alcohol/Drug Father      Arthritis Sister      Hypertension Sister      CANCER Son      DIABETES Son       Neurologic Disorder Sister      Schizophrenic     Hypertension Sister      Hyperlipidemia Sister      MENTAL ILLNESS Sister      DIABETES Son      Other Cancer Son          Current Outpatient Prescriptions   Medication Sig Dispense Refill     azithromycin (ZITHROMAX) 250 MG tablet Two tablets first day, then one tablet daily for four days. 6 tablet 0     topiramate (TOPAMAX) 25 MG tablet Take 2 tablets (50 mg) by mouth 2 times daily 360 tablet 3     zolpidem (AMBIEN) 5 MG tablet Take 1 tablet (5 mg) by mouth nightly as needed 90 tablet 1     pantoprazole (PROTONIX) 40 MG EC tablet TAKE ONE TABLET BY MOUTH ONCE DAILY 30-60 MINUTES BEFORE A MEAL 90 tablet 1     tofacitinib (XELJANZ XR) 11 MG 24 hr tablet Take 1 tablet (11 mg) by mouth daily 90 tablet 3     methotrexate 2.5 MG tablet Take 9 tablets (22.5 mg) by mouth once a week 108 tablet 1     cyclobenzaprine (FLEXERIL) 10 MG tablet Take 1 tablet (10 mg) by mouth 3 times daily as needed for muscle spasms 90 tablet 1     oxyCODONE (ROXICODONE) 5 MG IR tablet Take 1-2 tablets (5-10 mg) by mouth every 4 hours as needed for moderate to severe pain 75 tablet 0     cetirizine (ZYRTEC) 10 MG tablet Take 10 mg by mouth daily       sertraline (ZOLOFT) 25 MG tablet Take 25 mg by mouth daily        atorvastatin (LIPITOR) 40 MG tablet TAKE ONE TABLET BY MOUTH EVERY DAY 90 tablet 2     order for [facility] Respironics REMSTAR 60 Series Auto CPAP 12-15 cm H2O, Wisp nasal mask w/a s/m cushion       VITAMIN E PO Take 2,000 Units by mouth daily        Omega-3 Fatty Acids (OMEGA-3 FISH OIL PO) Take 2,000 mg by mouth daily        folic acid (FOLVITE) 1 MG tablet Take 1 tablet (1 mg) by mouth daily 100 tablet 3     multivitamin, therapeutic with minerals (MULTI-VITAMIN) TABS Take 1 tablet by mouth daily       magnesium oxide (MAG-OX) 400 (241.3 MG) MG tablet Take 1 tablet (400 mg) by mouth daily       propylene glycol (SYSTANE BALANCE) 0.6 % SOLN ophthalmic solution Place 1 drop into both eyes  "4 times daily Reported on 5/10/2017       ferrous gluconate (FERGON) 324 (38 FE) MG tablet Take 1 tablet (324 mg) by mouth 3 times daily (with meals) 270 tablet 1     cyanocobalamin 500 MCG TABS Take 1 tablet by mouth 2 times daily       Calcium Citrate-Vitamin D (CALCIUM CITRATE + PO) Take 2 tablets by mouth daily       Cholecalciferol (VITAMIN D-3 PO) Take 1,000 Units by mouth 2 times daily       Ascorbic Acid 1000 MG TABS Take 1 tablet by mouth 3 times daily       acetaminophen (TYLENOL) 500 MG tablet Take 2 tablets (1,000 mg) by mouth every 8 hours as needed for pain 100 tablet 0     vitamin  B complex with vitamin C (VITAMIN  B COMPLEX) TABS Take 1 tablet by mouth daily.  0     aspirin 81 MG tablet Take 1 tablet by mouth daily.       [DISCONTINUED] methotrexate 2.5 MG tablet Take 10 tablets (25 mg) by mouth once a week .  Split dose between AM and PM (5 tabs in the morning, and 5 tabs in the evening; all taken within the same day each week) 120 tablet 1     Allergies   Allergen Reactions     Abatacept      Severe headaches     Adhesive Tape      Plastic tape     Celebrex [Celecoxib]      Ineffective     Ethanol      Antihistamines     Orencia [Abatacept]      Headache     Septra [Bactrim]      Sulfa Drugs      \"deathly ill\"     Tramadol      Headache     BP Readings from Last 3 Encounters:   06/26/17 122/62   06/22/17 115/72   05/26/17 130/66    Wt Readings from Last 3 Encounters:   06/26/17 181 lb (82.1 kg)   06/22/17 183 lb (83 kg)   05/26/17 186 lb (84.4 kg)                  Labs reviewed in EPIC    Reviewed and updated as needed this visit by clinical staff  Tobacco  Allergies  Meds  Med Hx  Surg Hx  Fam Hx  Soc Hx      Reviewed and updated as needed this visit by Provider         ROS:  Constitutional, HEENT, cardiovascular, pulmonary, gi and gu systems are negative, except as otherwise noted.    OBJECTIVE:     /62  Pulse 82  Temp 97.9  F (36.6  C) (Oral)  Wt 181 lb (82.1 kg)  SpO2 96%  " Breastfeeding? No  BMI 29.44 kg/m2  Body mass index is 29.44 kg/(m^2).  GENERAL: healthy, alert and no distress  EYES: Eyes grossly normal to inspection, PERRL and conjunctivae and sclerae normal  HENT: normal cephalic/atraumatic, ear canals and TM's normal, nose and mouth without ulcers or lesions, oropharynx clear, oral mucous membranes moist and tonsillar erythema  NECK: no adenopathy, no asymmetry, masses, or scars and thyroid normal to palpation  RESP: lungs clear to auscultation - no rales, rhonchi or wheezes  CV: regular rate and rhythm, normal S1 S2, no S3 or S4, no murmur, click or rub, no peripheral edema and peripheral pulses strong  ABDOMEN: tenderness epigastric, no organomegaly or masses, bowel sounds normal and no palpable or pulsatile masses  MS: no gross musculoskeletal defects noted, no edema    Diagnostic Test Results:  Results for orders placed or performed in visit on 06/26/17 (from the past 24 hour(s))   Strep, Rapid Screen   Result Value Ref Range    Specimen Description Throat     Rapid Strep A Screen       NEGATIVE: No Group A streptococcal antigen detected by immunoassay, await   culture report.      Micro Report Status FINAL 06/26/2017        ASSESSMENT/PLAN:       1. Throat pain    - Strep, Rapid Screen  - Beta strep group A culture    2. Gastroenteritis  Possibly viral in origin.  No episodes today.  Patient to contact clinic if symptoms return.    3. Acute pharyngitis, unspecified etiology  Possibly viral in origin, but given length of symptoms, will treat with antibiotics.  - azithromycin (ZITHROMAX) 250 MG tablet; Two tablets first day, then one tablet daily for four days.  Dispense: 6 tablet; Refill: 0    FUTURE APPOINTMENTS:       - Follow-up for annual visit or as needed    AKIRA Jones [facility]

## 2017-06-26 NOTE — NURSING NOTE
"Chief Complaint   Patient presents with     Throat Problem     x 10 days     Fatigue     Diarrhea     Vomiting       Initial /62  Pulse 82  Temp 97.9  F (36.6  C) (Oral)  Wt 181 lb (82.1 kg)  SpO2 96%  Breastfeeding? No  BMI 29.44 kg/m2 Estimated body mass index is 29.44 kg/(m^2) as calculated from the following:    Height as of 6/22/17: 5' 5.75\" (1.67 m).    Weight as of this encounter: 181 lb (82.1 kg).  Medication Reconciliation: complete   KAYLA/MA      "

## 2017-06-26 NOTE — MR AVS SNAPSHOT
After Visit Summary   6/26/2017    Ginger Marshall    MRN: [id number]           Patient Information     Date Of Birth          1940        Visit Information        Provider Department      6/26/2017 8:20 AM Dahiana Flores APRN [facility]        Today's Diagnoses     Throat pain    -  1    Gastroenteritis        Acute pharyngitis, unspecified etiology          Care Instructions    [facility]    If you have any questions regarding to your visit please contact your care team:     Team Pink:   Clinic Hours Telephone Number   Internal Medicine:  Dr. Georgia Flores, NP       7am-7pm  Monday - Thursday   7am-5pm  Fridays  [phone]  (Appointment scheduling available 24/7)    Questions about your visit?  Team Line  [phone]   [facility] [place] [facility] 11am-9pm Monday-Friday Saturday-Sunday- 9am-5pm   [place] - 5pm-9pm Monday-Friday Saturday-Sunday- 9am-5pm  [phone] - [place]   [phone] - [place]       What options do I have for visits at the clinic other than the traditional office visit?  To expand how we care for you, many of our providers are utilizing electronic visits (e-visits) and telephone visits, when medically appropriate, for interactions with their patients rather than a visit in the clinic.   We also offer nurse visits for many medical concerns. Just like any other service, we will bill your insurance company for this type of visit based on time spent on the phone with your provider. Not all insurance companies cover these visits. Please check with your medical insurance if this type of visit is covered. You will be responsible for any charges that are not paid by your insurance.      E-visits via Countdown:  generally incur a $35.00 fee.  Telephone visits:  Time spent on the phone: *charged based on time that is spent on the phone in increments of 10 minutes.  Estimated cost:   5-10 mins $30.00   11-20 mins. $59.00   21-30 mins. $85.00   Use Budgehart (secure email communication and access to your chart) to send your primary care provider a message or make an appointment. Ask someone on your Team how to sign up for Angoss Softwaret.    For a Price Quote for your services, please call our Consumer Price Line at [phone].    As always, Thank you for trusting us with your health care needs!    KAYLA/MA            Follow-ups after your visit        Your next 10 appointments already scheduled     Jul 14, 2017  1:30 PM CDT   [facility] Visit with Aguila San MD   [facility]    [address] [address]   [phone]            Aug 04, 2017 10:30 AM CDT   LAB with [facility] LAB   [facility]    [address] [address]   [phone]           Patient must bring picture ID.  Patient should be prepared to give a urine specimen  Please do not eat 10-12 hours before your appointment if you are coming in fasting for labs on lipids, cholesterol, or glucose (sugar).  Pregnant women should follow their Care Team instructions. Water with medications is okay. Do not drink coffee or other fluids.   If you have concerns about taking  your medications, please ask at office or if scheduling via emids, send a message by clicking on Secure Messaging, Message Your Care Team.            Aug 09, 2017 10:40 AM CDT   Return Visit with Nico Byers MD   [facility]    [address] [address]   [phone]            Sep 25, 2017  1:00 PM CDT   Return Visit with Arlet Gonzalez MD   [facility]    [address] [place] [id number] [phone]              Who to contact     If you have questions or need follow up information about today's clinic visit or  your schedule please contact [facility] directly at [phone].  Normal or non-critical lab and imaging results will be communicated to you by MyChart, letter or phone within 4 business days after the clinic has received the results. If you do not hear from us within 7 days, please contact the clinic through MalÃ³ Clinichart or phone. If you have a critical or abnormal lab result, we will notify you by phone as soon as possible.  Submit refill requests through TVTY or call your pharmacy and they will forward the refill request to us. Please allow 3 business days for your refill to be completed.          Additional Information About Your Visit        MalÃ³ ClinicharWallCompass Information     TVTY gives you secure access to your electronic health record. If you see a primary care provider, you can also send messages to your care team and make appointments. If you have questions, please call your primary care clinic.  If you do not have a primary care provider, please call [phone] and they will assist you.        Care EveryWhere ID     This is your Care EveryWhere ID. This could be used by other organizations to access your [place] medical records  [id number]        Your Vitals Were     Pulse Temperature Pulse Oximetry Breastfeeding? BMI (Body Mass Index)       82 97.9  F (36.6  C) (Oral) 96% No 29.44 kg/m2        Blood Pressure from Last 3 Encounters:   06/26/17 122/62   06/22/17 115/72   05/26/17 130/66    Weight from Last 3 Encounters:   06/26/17 181 lb (82.1 kg)   06/22/17 183 lb (83 kg)   05/26/17 186 lb (84.4 kg)              We Performed the Following     Beta strep group A culture     Strep, Rapid Screen          Today's Medication Changes          These changes are accurate as of: 6/26/17  8:59 AM.  If you have any questions, ask your nurse or doctor.               Start taking these medicines.        Dose/Directions    azithromycin 250 MG tablet   Commonly known as:  ZITHROMAX   Used for:  Acute  pharyngitis, unspecified etiology   Started by:  Dahiana Flores, AKIRA CNP        Two tablets first day, then one tablet daily for four days.   Quantity:  6 tablet   Refills:  0            Where to get your medicines      These medications were sent to [facility] [place], [place] - [address]  [address], [facility] [address]     Phone:  [phone]     azithromycin 250 MG tablet                Primary Care Provider Office Phone # Fax #    Georgia Vázquez [phone]       [facility] [address] [address]        Equal Access to Services     [facility] : Hadii jonah grant hadirao Sodasia, waaxda luqadaha, qaybta kaalmada ademartyada, aj hanks . So [facility] [phone].    ATENCIÓN: Si habla español, tiene a herrera disposición servicios gratuitos de asistencia lingüística. Llame al [phone].    We comply with applicable federal civil rights laws and [place] laws. We do not discriminate on the basis of race, color, national origin, age, disability sex, sexual orientation or gender identity.            Thank you!     Thank you for choosing [facility]  for your care. Our goal is always to provide you with excellent care. Hearing back from our patients is one way we can continue to improve our services. Please take a few minutes to complete the written survey that you may receive in the mail after your visit with us. Thank you!             Your Updated Medication List - Protect others around you: Learn how to safely use, store and throw away your medicines at www.disposemymeds.org.          This list is accurate as of: 6/26/17  8:59 AM.  Always use your most recent med list.                   Brand Name Dispense Instructions for use Diagnosis    ascorbic acid 1000 MG Tabs tablet      Take 1 tablet by mouth 3 times daily        aspirin 81 MG tablet      Take 1 tablet by mouth daily.         atorvastatin 40 MG tablet    LIPITOR    90 tablet    TAKE ONE TABLET BY MOUTH EVERY DAY    Hyperlipidemia LDL goal <100       azithromycin 250 MG tablet    ZITHROMAX    6 tablet    Two tablets first day, then one tablet daily for four days.    Acute pharyngitis, unspecified etiology       CALCIUM CITRATE + PO      Take 2 tablets by mouth daily        cetirizine 10 MG tablet    zyrTEC     Take 10 mg by mouth daily        cyanocobalamin 500 MCG Tabs      Take 1 tablet by mouth 2 times daily        cyclobenzaprine 10 MG tablet    FLEXERIL    90 tablet    Take 1 tablet (10 mg) by mouth 3 times daily as needed for muscle spasms    S/P lumbar fusion       ferrous gluconate 324 (38 FE) MG tablet    FERGON    270 tablet    Take 1 tablet (324 mg) by mouth 3 times daily (with meals)    Low iron       folic acid 1 MG tablet    FOLVITE    100 tablet    Take 1 tablet (1 mg) by mouth daily    Rheumatoid arthritis involving multiple sites with positive rheumatoid factor (H), High risk medications (not anticoagulants) long-term use       magnesium oxide 400 (241.3 MG) MG tablet    MAG-OX     Take 1 tablet (400 mg) by mouth daily        methotrexate 2.5 MG tablet     108 tablet    Take 9 tablets (22.5 mg) by mouth once a week    Rheumatoid arthritis involving multiple sites with positive rheumatoid factor (H), High risk medications (not anticoagulants) long-term use       Multi-vitamin Tabs tablet      Take 1 tablet by mouth daily        OMEGA-3 FISH OIL PO      Take 2,000 mg by mouth daily        order for [facility]      Respironics REMSTAR 60 Series Auto CPAP 12-15 cm H2O, Wisp nasal mask w/a s/m cushion        oxyCODONE 5 MG IR tablet    ROXICODONE    75 tablet    Take 1-2 tablets (5-10 mg) by mouth every 4 hours as needed for moderate to severe pain    S/P lumbar fusion       pantoprazole 40 MG EC tablet    PROTONIX    90 tablet    TAKE ONE TABLET BY MOUTH ONCE DAILY 30-60 MINUTES BEFORE A MEAL    Gastroesophageal reflux  disease without esophagitis       propylene glycol 0.6 % Soln ophthalmic solution    SYSTANE BALANCE     Place 1 drop into both eyes 4 times daily Reported on 5/10/2017        tofacitinib 11 MG 24 hr tablet    XELJANZ XR    90 tablet    Take 1 tablet (11 mg) by mouth daily    Rheumatoid arthritis involving multiple sites with positive rheumatoid factor (H), High risk medications (not anticoagulants) long-term use       topiramate 25 MG tablet    TOPAMAX    360 tablet    Take 2 tablets (50 mg) by mouth 2 times daily    Non morbid obesity due to excess calories       TYLENOL 500 MG tablet   Generic drug:  acetaminophen     100 tablet    Take 2 tablets (1,000 mg) by mouth every 8 hours as needed for pain        vitamin B complex with vitamin C Tabs tablet      Take 1 tablet by mouth daily.        VITAMIN D-3 PO      Take 1,000 Units by mouth 2 times daily        VITAMIN E PO      Take 2,000 Units by mouth daily        ZOLOFT 25 MG tablet   Generic drug:  sertraline      Take 25 mg by mouth daily        zolpidem 5 MG tablet    AMBIEN    90 tablet    Take 1 tablet (5 mg) by mouth nightly as needed    Psychophysiologic insomnia

## 2017-06-27 LAB
BACTERIA SPEC CULT: NORMAL
MICRO REPORT STATUS: NORMAL
SPECIMEN SOURCE: NORMAL

## 2017-07-14 ENCOUNTER — OFFICE VISIT (OUTPATIENT)
Dept: OPHTHALMOLOGY | Facility: CLINIC | Age: 77
End: 2017-07-14
Payer: COMMERCIAL

## 2017-07-14 DIAGNOSIS — H52.4 PRESBYOPIA: ICD-10-CM

## 2017-07-14 DIAGNOSIS — Z96.1 PSEUDOPHAKIA: ICD-10-CM

## 2017-07-14 DIAGNOSIS — Z01.01 ENCOUNTER FOR EXAMINATION OF EYES AND VISION WITH ABNORMAL FINDINGS: Primary | ICD-10-CM

## 2017-07-14 DIAGNOSIS — H43.813 PVD (POSTERIOR VITREOUS DETACHMENT), BILATERAL: ICD-10-CM

## 2017-07-14 DIAGNOSIS — H01.02B SQUAMOUS BLEPHARITIS OF UPPER AND LOWER EYELIDS OF BOTH EYES: ICD-10-CM

## 2017-07-14 DIAGNOSIS — H01.02A SQUAMOUS BLEPHARITIS OF UPPER AND LOWER EYELIDS OF BOTH EYES: ICD-10-CM

## 2017-07-14 DIAGNOSIS — H18.519 CORNEA GUTTATA: ICD-10-CM

## 2017-07-14 DIAGNOSIS — H52.203 ASTIGMATISM OF BOTH EYES, UNSPECIFIED TYPE: ICD-10-CM

## 2017-07-14 DIAGNOSIS — H02.889 MGD (MEIBOMIAN GLAND DYSFUNCTION): ICD-10-CM

## 2017-07-14 DIAGNOSIS — H52.13 MYOPIA, BILATERAL: ICD-10-CM

## 2017-07-14 DIAGNOSIS — H26.8 PXF (PSEUDOEXFOLIATION OF LENS CAPSULE): ICD-10-CM

## 2017-07-14 PROCEDURE — 92015 DETERMINE REFRACTIVE STATE: CPT | Performed by: STUDENT IN AN ORGANIZED HEALTH CARE EDUCATION/TRAINING PROGRAM

## 2017-07-14 PROCEDURE — 92014 COMPRE OPH EXAM EST PT 1/>: CPT | Performed by: STUDENT IN AN ORGANIZED HEALTH CARE EDUCATION/TRAINING PROGRAM

## 2017-07-14 ASSESSMENT — TONOMETRY
IOP_METHOD: APPLANATION
OS_IOP_MMHG: 19
OD_IOP_MMHG: 15

## 2017-07-14 ASSESSMENT — CUP TO DISC RATIO
OS_RATIO: 0.35
OD_RATIO: 0.4

## 2017-07-14 ASSESSMENT — REFRACTION_WEARINGRX
OD_ADD: +2.75
OD_SPHERE: -0.50
OD_AXIS: 173
OS_ADD: +2.75
OS_SPHERE: -0.25
OS_CYLINDER: +0.25
OD_CYLINDER: +0.75
SPECS_TYPE: PAL
OS_AXIS: 023

## 2017-07-14 ASSESSMENT — VISUAL ACUITY
CORRECTION_TYPE: GLASSES
OS_CC+: -2
OD_CC: 20/20
OS_CC: 10
OD_CC: 8
METHOD: SNELLEN - LINEAR
OS_CC: 20/20

## 2017-07-14 ASSESSMENT — CONF VISUAL FIELD
OS_NORMAL: 1
OD_NORMAL: 1

## 2017-07-14 ASSESSMENT — REFRACTION_MANIFEST
OD_SPHERE: -0.50
OD_ADD: +3.00
OD_AXIS: 178
OS_CYLINDER: +0.25
OS_AXIS: 022
OD_CYLINDER: +1.00
OS_SPHERE: -0.25
OS_ADD: +3.00

## 2017-07-14 ASSESSMENT — EXTERNAL EXAM - LEFT EYE: OS_EXAM: NORMAL

## 2017-07-14 ASSESSMENT — SLIT LAMP EXAM - LIDS
COMMENTS: 1+ MEIBOMIAN GLAND DYSFUNCTION, 1+ BLEPHARITIS
COMMENTS: 1+ MEIBOMIAN GLAND DYSFUNCTION, 1+ BLEPHARITIS

## 2017-07-14 ASSESSMENT — EXTERNAL EXAM - RIGHT EYE: OD_EXAM: NORMAL

## 2017-07-14 NOTE — PATIENT INSTRUCTIONS
Glasses prescription given - optional  Continue artificial tears up to 4 times a day in both eyes    Aguila San MD  [phone]

## 2017-07-14 NOTE — MR AVS SNAPSHOT
After Visit Summary   7/14/2017    Ginger Marshall    MRN: [id number]           Patient Information     Date Of Birth          1940        Visit Information        Provider Department      7/14/2017 1:30 PM Aguila San MD [facility] [place]        Today's Diagnoses     Encounter for examination of eyes and vision with abnormal findings    -  1    Presbyopia        Myopia, bilateral        Astigmatism of both eyes, unspecified type        PSEUDOPHAKIA OU        PXF (PSEUDOEXFOLIATION OF LENS CAPSULE) OD        Cornea guttata, ou        MGD (meibomian gland dysfunction)        Squamous blepharitis of upper and lower eyelids of both eyes        PVD (posterior vitreous detachment), bilateral          Care Instructions    Glasses prescription given - optional  Continue artificial tears up to 4 times a day in both eyes    Aguila San MD  [phone]            Follow-ups after your visit        Follow-up notes from your care team     Return in about 1 year (around 7/14/2018) for Complete Exam.      Your next 10 appointments already scheduled     Aug 04, 2017 10:30 AM CDT   LAB with  LAB   [facility] ([facility] [place])    [address]  [place] [address] [phone]           Patient must bring picture ID.  Patient should be prepared to give a urine specimen  Please do not eat 10-12 hours before your appointment if you are coming in fasting for labs on lipids, cholesterol, or glucose (sugar).  Pregnant women should follow their Care Team instructions. Water with medications is okay. Do not drink coffee or other fluids.   If you have concerns about taking  your medications, please ask at office or if scheduling via Twigmoret, send a message by clicking on Secure Messaging, Message Your Care Team.            Aug 09, 2017 10:40 AM CDT   Return Visit with Nico Byers MD   [facility]    [address]  [place] [address]   [phone]            Sep 25, 2017  1:00 PM CDT   [facility] Visit with Arlet Gonzalez MD   [facility]    [address] [place] [id number] [phone]              Who to contact     If you have questions or need follow up information about today's clinic visit or your schedule please contact [facility] directly at [phone].  Normal or non-critical lab and imaging results will be communicated to you by Exechart, letter or phone within 4 business days after the clinic has received the results. If you do not hear from us within 7 days, please contact the clinic through Exechart or phone. If you have a critical or abnormal lab result, we will notify you by phone as soon as possible.  Submit refill requests through Cloud Takeoff or call your pharmacy and they will forward the refill request to us. Please allow 3 business days for your refill to be completed.          Additional Information About Your Visit        Exechart Information     Cloud Takeoff gives you secure access to your electronic health record. If you see a primary care provider, you can also send messages to your care team and make appointments. If you have questions, please call your primary care clinic.  If you do not have a primary care provider, please call [phone] and they will assist you.        Care EveryWhere ID     This is your Care EveryWhere ID. This could be used by other organizations to access your [place] medical records  [id number]         Blood Pressure from Last 3 Encounters:   06/26/17 122/62   06/22/17 115/72   05/26/17 130/66    Weight from Last 3 Encounters:   06/26/17 82.1 kg (181 lb)   06/22/17 83 kg (183 lb)   05/26/17 84.4 kg (186 lb)              We Performed the Following     EYE EXAM (SIMPLE-NONBILLABLE)     REFRACTIVE STATUS        Primary Care Provider Office Phone # Fax #    Georgia Vázquez [phone]  [phone]       [facility] [address] [address]        Equal Access to Services     SHANIQUE WATSON : Hadii jonah grant andrea Rosales, wakeilyda luqlona, qaybta kaalmada kimber, aj carmenwilian rebecca. So [facility] [phone].    ATENCIÓN: Si habla español, tiene a herrera disposición servicios gratuitos de asistencia lingüística. Llame al [phone].    We comply with applicable federal civil rights laws and [place] laws. We do not discriminate on the basis of race, color, national origin, age, disability sex, sexual orientation or gender identity.            Thank you!     Thank you for choosing [facility]  for your care. Our goal is always to provide you with excellent care. Hearing back from our patients is one way we can continue to improve our services. Please take a few minutes to complete the written survey that you may receive in the mail after your visit with us. Thank you!             Your Updated Medication List - Protect others around you: Learn how to safely use, store and throw away your medicines at www.disposemymeds.org.          This list is accurate as of: 7/14/17  2:37 PM.  Always use your most recent med list.                   Brand Name Dispense Instructions for use Diagnosis    ascorbic acid 1000 MG Tabs tablet      Take 1 tablet by mouth 3 times daily        aspirin 81 MG tablet      Take 1 tablet by mouth daily.        atorvastatin 40 MG tablet    LIPITOR    90 tablet    TAKE ONE TABLET BY MOUTH EVERY DAY    Hyperlipidemia LDL goal <100       azithromycin 250 MG tablet    ZITHROMAX    6 tablet    Two tablets first day, then one tablet daily for four days.    Acute pharyngitis, unspecified etiology       CALCIUM CITRATE + PO      Take 2 tablets by mouth daily        cetirizine 10 MG tablet    zyrTEC     Take 10 mg by mouth daily        cyanocobalamin 500 MCG Tabs      Take 1 tablet by mouth 2 times daily        cyclobenzaprine 10  MG tablet    FLEXERIL    90 tablet    Take 1 tablet (10 mg) by mouth 3 times daily as needed for muscle spasms    S/P lumbar fusion       ferrous gluconate 324 (38 FE) MG tablet    FERGON    270 tablet    Take 1 tablet (324 mg) by mouth 3 times daily (with meals)    Low iron       folic acid 1 MG tablet    FOLVITE    100 tablet    Take 1 tablet (1 mg) by mouth daily    Rheumatoid arthritis involving multiple sites with positive rheumatoid factor (H), High risk medications (not anticoagulants) long-term use       magnesium oxide 400 (241.3 MG) MG tablet    MAG-OX     Take 1 tablet (400 mg) by mouth daily        methotrexate 2.5 MG tablet     108 tablet    Take 9 tablets (22.5 mg) by mouth once a week    Rheumatoid arthritis involving multiple sites with positive rheumatoid factor (H), High risk medications (not anticoagulants) long-term use       Multi-vitamin Tabs tablet      Take 1 tablet by mouth daily        OMEGA-3 FISH OIL PO      Take 2,000 mg by mouth daily        [place] [facility]      Respironics REMSTAR 60 Series Auto CPAP 12-15 cm H2O, Wisp nasal mask w/a s/m cushion        oxyCODONE 5 MG IR tablet    ROXICODONE    75 tablet    Take 1-2 tablets (5-10 mg) by mouth every 4 hours as needed for moderate to severe pain    S/P lumbar fusion       pantoprazole 40 MG EC tablet    PROTONIX    90 tablet    TAKE ONE TABLET BY MOUTH ONCE DAILY 30-60 MINUTES BEFORE A MEAL    Gastroesophageal reflux disease without esophagitis       propylene glycol 0.6 % Soln ophthalmic solution    SYSTANE BALANCE     Place 1 drop into both eyes 4 times daily Reported on 5/10/2017        tofacitinib 11 MG 24 hr tablet    XELJANZ XR    90 tablet    Take 1 tablet (11 mg) by mouth daily    Rheumatoid arthritis involving multiple sites with positive rheumatoid factor (H), High risk medications (not anticoagulants) long-term use       topiramate 25 MG tablet    TOPAMAX    360 tablet    Take 2 tablets (50 mg) by mouth 2 times daily    Non  morbid obesity due to excess calories       TYLENOL 500 MG tablet   Generic drug:  acetaminophen     100 tablet    Take 2 tablets (1,000 mg) by mouth every 8 hours as needed for pain        vitamin B complex with vitamin C Tabs tablet      Take 1 tablet by mouth daily.        VITAMIN D-3 PO      Take 1,000 Units by mouth 2 times daily        VITAMIN E PO      Take 2,000 Units by mouth daily        ZOLOFT 25 MG tablet   Generic drug:  sertraline      Take 25 mg by mouth daily        zolpidem 5 MG tablet    AMBIEN    90 tablet    Take 1 tablet (5 mg) by mouth nightly as needed    Psychophysiologic insomnia

## 2017-07-14 NOTE — PROGRESS NOTES
" Current Eye Medications:  systane both eyes twice a day.       Subjective:  Comprehensive Eye Exam.  She has felt that her vision is getting worse, distance and near, but each time she comes it she is told \"there is no change.\"       Objective:  See Ophthalmology Exam.     Assessment:  Ginger Marshall is a 77 year old female who presents with:     PSEUDOPHAKIA OU      PXF (PSEUDOEXFOLIATION OF LENS CAPSULE) OD      Cornea guttata, ou      MGD (meibomian gland dysfunction)      Squamous blepharitis of upper and lower eyelids of both eyes      PVD (posterior vitreous detachment), bilateral        Plan:  Glasses prescription given - optional  Continue artificial tears up to 4 times a day in both eyes    Aguila San MD  [phone]      "

## 2017-08-04 DIAGNOSIS — Z79.899 HIGH RISK MEDICATIONS (NOT ANTICOAGULANTS) LONG-TERM USE: ICD-10-CM

## 2017-08-04 DIAGNOSIS — M05.79 RHEUMATOID ARTHRITIS INVOLVING MULTIPLE SITES WITH POSITIVE RHEUMATOID FACTOR (H): ICD-10-CM

## 2017-08-04 LAB
ALBUMIN SERPL-MCNC: 3.7 G/DL (ref 3.4–5)
ALP SERPL-CCNC: 50 U/L (ref 40–150)
ALT SERPL W P-5'-P-CCNC: 24 U/L (ref 0–50)
AST SERPL W P-5'-P-CCNC: 13 U/L (ref 0–45)
BASOPHILS # BLD AUTO: 0 10E9/L (ref 0–0.2)
BASOPHILS NFR BLD AUTO: 0.2 %
BILIRUB DIRECT SERPL-MCNC: <0.1 MG/DL (ref 0–0.2)
BILIRUB SERPL-MCNC: 0.3 MG/DL (ref 0.2–1.3)
CREAT SERPL-MCNC: 0.54 MG/DL (ref 0.52–1.04)
CRP SERPL-MCNC: <2.9 MG/L (ref 0–8)
DIFFERENTIAL METHOD BLD: ABNORMAL
EOSINOPHIL # BLD AUTO: 0.2 10E9/L (ref 0–0.7)
EOSINOPHIL NFR BLD AUTO: 3.6 %
ERYTHROCYTE [DISTWIDTH] IN BLOOD BY AUTOMATED COUNT: 16.5 % (ref 10–15)
ERYTHROCYTE [SEDIMENTATION RATE] IN BLOOD BY WESTERGREN METHOD: 31 MM/H (ref 0–30)
GFR SERPL CREATININE-BSD FRML MDRD: NORMAL ML/MIN/1.7M2
HCT VFR BLD AUTO: 37 % (ref 35–47)
HGB BLD-MCNC: 11.9 G/DL (ref 11.7–15.7)
LYMPHOCYTES # BLD AUTO: 1.5 10E9/L (ref 0.8–5.3)
LYMPHOCYTES NFR BLD AUTO: 25.1 %
MCH RBC QN AUTO: 30.8 PG (ref 26.5–33)
MCHC RBC AUTO-ENTMCNC: 32.2 G/DL (ref 31.5–36.5)
MCV RBC AUTO: 96 FL (ref 78–100)
MONOCYTES # BLD AUTO: 0.7 10E9/L (ref 0–1.3)
MONOCYTES NFR BLD AUTO: 12.2 %
NEUTROPHILS # BLD AUTO: 3.4 10E9/L (ref 1.6–8.3)
NEUTROPHILS NFR BLD AUTO: 58.9 %
PLATELET # BLD AUTO: 344 10E9/L (ref 150–450)
PROT SERPL-MCNC: 7.8 G/DL (ref 6.8–8.8)
RBC # BLD AUTO: 3.86 10E12/L (ref 3.8–5.2)
WBC # BLD AUTO: 5.8 10E9/L (ref 4–11)

## 2017-08-04 PROCEDURE — 36415 COLL VENOUS BLD VENIPUNCTURE: CPT | Performed by: INTERNAL MEDICINE

## 2017-08-04 PROCEDURE — 80076 HEPATIC FUNCTION PANEL: CPT | Performed by: INTERNAL MEDICINE

## 2017-08-04 PROCEDURE — 85652 RBC SED RATE AUTOMATED: CPT | Performed by: INTERNAL MEDICINE

## 2017-08-04 PROCEDURE — 86140 C-REACTIVE PROTEIN: CPT | Performed by: INTERNAL MEDICINE

## 2017-08-04 PROCEDURE — 82565 ASSAY OF CREATININE: CPT | Performed by: INTERNAL MEDICINE

## 2017-08-04 PROCEDURE — 85025 COMPLETE CBC W/AUTO DIFF WBC: CPT | Performed by: INTERNAL MEDICINE

## 2017-08-09 ENCOUNTER — OFFICE VISIT (OUTPATIENT)
Dept: RHEUMATOLOGY | Facility: CLINIC | Age: 77
End: 2017-08-09
Payer: COMMERCIAL

## 2017-08-09 VITALS
OXYGEN SATURATION: 97 % | HEART RATE: 77 BPM | WEIGHT: 183.8 LBS | DIASTOLIC BLOOD PRESSURE: 68 MMHG | HEIGHT: 66 IN | BODY MASS INDEX: 29.54 KG/M2 | SYSTOLIC BLOOD PRESSURE: 111 MMHG | TEMPERATURE: 97.5 F

## 2017-08-09 DIAGNOSIS — M05.79 RHEUMATOID ARTHRITIS INVOLVING MULTIPLE SITES WITH POSITIVE RHEUMATOID FACTOR (H): Primary | ICD-10-CM

## 2017-08-09 DIAGNOSIS — Z79.899 HIGH RISK MEDICATIONS (NOT ANTICOAGULANTS) LONG-TERM USE: ICD-10-CM

## 2017-08-09 PROCEDURE — 99213 OFFICE O/P EST LOW 20 MIN: CPT | Performed by: INTERNAL MEDICINE

## 2017-08-09 RX ORDER — METHOTREXATE SODIUM 2.5 MG/1
17.5 TABLET ORAL WEEKLY
Qty: 84 TABLET | Refills: 1 | Status: SHIPPED | OUTPATIENT
Start: 2017-08-09 | End: 2017-10-10

## 2017-08-09 NOTE — MR AVS SNAPSHOT
After Visit Summary   8/9/2017    Ginger Mrashall    MRN: [id number]           Patient Information     Date Of Birth          1940        Visit Information        Provider Department      8/9/2017 10:40 AM Nico Byers MD [facility] [place]        Today's Diagnoses     Rheumatoid arthritis involving multiple sites with positive rheumatoid factor (H)    -  1    High risk medications (not anticoagulants) long-term use           Follow-ups after your visit        Follow-up notes from your care team     Return in about 3 months (around 11/9/2017) for f/u RA.      Your next 10 appointments already scheduled     Sep 25, 2017  1:00 PM CDT   Return Visit with Arlet Gonzalez MD   [facility] ([facility])    [address] [place] [id number] [phone]            Nov 06, 2017 10:00 AM CST   LAB with  LAB   [facility] ([facility]    [address] [facility] [address]   [phone]           Patient must bring picture ID. Patient should be prepared to give a urine specimen  Please do not eat 10-12 hours before your appointment if you are coming in fasting for labs on lipids, cholesterol, or glucose (sugar). Pregnant women should follow their Care Team instructions. Water with medications is okay. Do not drink coffee or other fluids. If you have concerns about taking  your medications, please ask at office or if scheduling via [employer], send a message by clicking on Secure Messaging, Message Your Care Team.            Nov 08, 2017 10:40 AM CST   Return Visit with Nico Byers MD   [facility] [place] ([facility])    [address] [place] [id number]   [phone]              Future tests that were ordered for you today     Open Future Orders        Priority Expected Expires Ordered    CBC with platelets differential Routine 11/3/2017 11/22/2017 8/9/2017    Creatinine  "Routine 11/3/2017 11/22/2017 8/9/2017    CRP inflammation Routine 11/3/2017 11/22/2017 8/9/2017    Erythrocyte sedimentation rate auto Routine 11/3/2017 11/22/2017 8/9/2017    Hepatic panel Routine 11/3/2017 11/22/2017 8/9/2017            Who to contact     If you have questions or need follow up information about today's clinic visit or your schedule please contact [facility] directly at [phone].  Normal or non-critical lab and imaging results will be communicated to you by [employer], letter or phone within 4 business days after the clinic has received the results. If you do not hear from us within 7 days, please contact the clinic through "[employer] or phone. If you have a critical or abnormal lab result, we will notify you by phone as soon as possible.  Submit refill requests through "[employer] or call your pharmacy and they will forward the refill request to us. Please allow 3 business days for your refill to be completed.          Additional Information About Your Visit        [employer] Information     "[employer] gives you secure access to your electronic health record. If you see a primary care provider, you can also send messages to your care team and make appointments. If you have questions, please call your primary care clinic.  If you do not have a primary care provider, please call [phone] and they will assist you.        Care EveryWhere ID     This is your Care EveryWhere ID. This could be used by other organizations to access your [facility] medical records  [id number]        Your Vitals Were     Pulse Temperature Height Pulse Oximetry BMI (Body Mass Index)       77 97.5  F (36.4  C) (Oral) 1.67 m (5' 5.75\") 97% 29.89 kg/m2        Blood Pressure from Last 3 Encounters:   08/09/17 111/68   06/26/17 122/62   06/22/17 115/72    Weight from Last 3 Encounters:   08/09/17 83.4 kg (183 lb 12.8 oz)   06/26/17 82.1 kg (181 lb)   06/22/17 83 kg (183 lb)                 Today's Medication Changes        "   These changes are accurate as of: 8/9/17 11:16 AM.  If you have any questions, ask your nurse or doctor.               These medicines have changed or have updated prescriptions.        Dose/Directions    methotrexate 2.5 MG tablet   This may have changed:  how much to take   Used for:  Rheumatoid arthritis involving multiple sites with positive rheumatoid factor (H), High risk medications (not anticoagulants) long-term use   Changed by:  Nico Byers MD        Dose:  17.5 mg   Take 7 tablets (17.5 mg) by mouth once a week   Quantity:  84 tablet   Refills:  1         Stop taking these medicines if you haven't already. Please contact your care team if you have questions.     folic acid 1 MG tablet   Commonly known as:  FOLVITE   Stopped by:  Nico Byers MD                Where to get your medicines      These medications were sent to [facility] [place] - [address]  [address], [facility] [address]     Phone:  [phone]     methotrexate 2.5 MG tablet                Primary Care Provider Office Phone # Fax #    Georgia Ju Vázquez [phone] [address] [place] [address]        Equal Access to Services     [facility] Center[facility]ITISH AH: Hadii jonah grant hadasho Soomaali, waaxda luqadaha, qaybta kaalmada adeegyada, aj fernandez. So [facility] [phone].    ATENCIÓN: Si habla español, tiene a herrera disposición servicios gratuitos de asistencia lingüística. LlSelect Medical [facility] [phone].    We comply with applicable federal civil rights laws and [place] laws. We do not discriminate on the basis of race, color, national origin, age, disability sex, sexual orientation or gender identity.            Thank you!     Thank you for choosing [facility]  for your care. Our goal is always to provide you with excellent care. Hearing back from our patients is one way we can continue to improve our services. Please take a few  minutes to complete the written survey that you may receive in the mail after your visit with us. Thank you!             Your Updated Medication List - Protect others around you: Learn how to safely use, store and throw away your medicines at www.disposemymeds.org.          This list is accurate as of: 8/9/17 11:16 AM.  Always use your most recent med list.                   Brand Name Dispense Instructions for use Diagnosis    ascorbic acid 1000 MG Tabs tablet      Take 1 tablet by mouth 3 times daily        aspirin 81 MG tablet      Take 1 tablet by mouth daily.        atorvastatin 40 MG tablet    LIPITOR    90 tablet    TAKE ONE TABLET BY MOUTH EVERY DAY    Hyperlipidemia LDL goal <100       azithromycin 250 MG tablet    ZITHROMAX    6 tablet    Two tablets first day, then one tablet daily for four days.    Acute pharyngitis, unspecified etiology       CALCIUM CITRATE + PO      Take 2 tablets by mouth daily        cetirizine 10 MG tablet    zyrTEC     Take 10 mg by mouth daily        cyanocobalamin 500 MCG Tabs      Take 1 tablet by mouth 2 times daily        cyclobenzaprine 10 MG tablet    FLEXERIL    90 tablet    Take 1 tablet (10 mg) by mouth 3 times daily as needed for muscle spasms    S/P lumbar fusion       ferrous gluconate 324 (38 FE) MG tablet    FERGON    270 tablet    Take 1 tablet (324 mg) by mouth 3 times daily (with meals)    Low iron       magnesium oxide 400 (241.3 MG) MG tablet    MAG-OX     Take 1 tablet (400 mg) by mouth daily        methotrexate 2.5 MG tablet     84 tablet    Take 7 tablets (17.5 mg) by mouth once a week    Rheumatoid arthritis involving multiple sites with positive rheumatoid factor (H), High risk medications (not anticoagulants) long-term use       Multi-vitamin Tabs tablet      Take 1 tablet by mouth daily        OMEGA-3 FISH OIL PO      Take 2,000 mg by mouth daily        order for [facility]      Respironics REMSTAR 60 Series Auto CPAP 12-15 cm H2O, Wisp nasal mask w/a s/m  cushion        oxyCODONE 5 MG IR tablet    ROXICODONE    75 tablet    Take 1-2 tablets (5-10 mg) by mouth every 4 hours as needed for moderate to severe pain    S/P lumbar fusion       pantoprazole 40 MG EC tablet    PROTONIX    90 tablet    TAKE ONE TABLET BY MOUTH ONCE DAILY 30-60 MINUTES BEFORE A MEAL    Gastroesophageal reflux disease without esophagitis       propylene glycol 0.6 % Soln ophthalmic solution    SYSTANE BALANCE     Place 1 drop into both eyes 4 times daily Reported on 5/10/2017        tofacitinib 11 MG 24 hr tablet    XELJANZ XR    90 tablet    Take 1 tablet (11 mg) by mouth daily    Rheumatoid arthritis involving multiple sites with positive rheumatoid factor (H), High risk medications (not anticoagulants) long-term use       topiramate 25 MG tablet    TOPAMAX    360 tablet    Take 2 tablets (50 mg) by mouth 2 times daily    Non morbid obesity due to excess calories       TYLENOL 500 MG tablet   Generic drug:  acetaminophen     100 tablet    Take 2 tablets (1,000 mg) by mouth every 8 hours as needed for pain        vitamin B complex with vitamin C Tabs tablet      Take 1 tablet by mouth daily.        VITAMIN D-3 PO      Take 1,000 Units by mouth 2 times daily        VITAMIN E PO      Take 2,000 Units by mouth daily        ZOLOFT 25 MG tablet   Generic drug:  sertraline      Take 25 mg by mouth daily        zolpidem 5 MG tablet    AMBIEN    90 tablet    Take 1 tablet (5 mg) by mouth nightly as needed    Psychophysiologic insomnia

## 2017-08-09 NOTE — NURSING NOTE
"Chief Complaint   Patient presents with     Arthritis     RA, patient states she feels good, hoping to decrease methotrexate again.       Initial /68 (BP Location: Left arm, Patient Position: Chair, Cuff Size: Adult Regular)  Pulse 77  Temp 97.5  F (36.4  C) (Oral)  Ht 1.67 m (5' 5.75\")  Wt 83.4 kg (183 lb 12.8 oz)  SpO2 97%  BMI 29.89 kg/m2 Estimated body mass index is 29.89 kg/(m^2) as calculated from the following:    Height as of this encounter: 1.67 m (5' 5.75\").    Weight as of this encounter: 83.4 kg (183 lb 12.8 oz).  BP completed using cuff size: regular         RAPID3 (0-30) Cumulative Score  0          RAPID3 Weighted Score (divide #4 by 3 and that is the weighted score)  0         "

## 2017-08-09 NOTE — PROGRESS NOTES
Rheumatology Clinic Visit      Ginger Marshall MRN# [id number]   YOB: 1940 Age: 77 year old      Date of visit: 8/09/17   PCP: Georgia Vázquez MD     Chief Complaint   Patient presents with:  Arthritis: RA, patient states she feels good, hoping to decrease methotrexate again.    Assessment and Plan   1. Seropositive ( [2009], CCP >100 [2009]; hx of rheumatoid nodule by 6/14/2011 pathology) Erosive Rheumatoid Arthritis: Previously failed remicade (staph infection during therapy, but was immediately after a joint injection) and orencia (migraines). Currently on methotrexate 22.5 mg once weekly, folic acid 800 mcg daily, Xeljanz XR 11mg daily. She also has prednisone 20 mg daily ×5 days to use as needed for flare; she has not required this.  Doing well today and therefore reduce methotrexate again today.  Note that she had a history of oral sore that resolved with folic acid 2mg daily; she ran out of folic acid and did not want to restart it yet; she takes other supplements that total 800mcg of folic acid; if needed in the future may restart additional folic acid.    - Reduce methotrexate to 17.5 mg once weekly  - Continue folic acid 800mcg daily as noted above (from over-the-counter supplements)  - Continue Xeljanz XR 11mg daily  - PRN Flare: prednisone 20mg daily x5days  - Labs 2-3 days prior to the next rheumatology clinic visit: CBC, Creatinine, Hepatic Panel, ESR, CRP              Rapid 3, cumulative scores                      08/09/2017: 0 (MTX 22.5mg wkly, Xeljanz XR 11mg daily)                      05/10/2017: 5 (MTX 25mg wkly, Xeljanz XR 11mg daily) RA doing well          2. Right hip pain: Status post WALTER twice in the past. Followed by [facility] orth[facility].  Note that she does have spinal stenosis that may be contributing and she feels much better after back surgery.     3. Chronic back pain, improved s/p fusion: She has been to an orthopedic surgeon told her that she needed an operation  but then when she went to see her orthopedic surgeon that did the hip replacement he told her not to have the surgery. She then went to see Dr. Michele, neurosurgeon, and had a lumbar fusion with significant improvement.      4. Bone Health: Managed by Endocrinology.     5. Iron Deficiency Anemia: Managed by PCP.     Ms. Marshall verbalized agreement with and understanding of the rational for the diagnosis and treatment plan.  All questions were answered to best of my ability and the patient's satisfaction. Ms. Marshall was advised to contact the clinic with any questions that may arise after the clinic visit.      Thank you for involving me in the care of the patient    Return to clinic: 3 months    HPI   Ginger Marshall is a 77 year old female with a history of multiple foot surgeries, hand tendon transfers, MCP replacements (most recent being in August 2015), bilateral TKA, right WALTER, left distal radius fracture history, s/p lumbar fusion, osteoporosis and seropositive (RF+, CCP+) erosive rheumatoid arthritis who presents for follow-up of rheumatoid arthritis.      Today, Ms. Marshall reports that her rheumatoid arthritis is doing great. She reduce methotrexate from 25 mg once weekly to 22.5 mg once weekly with no change in her symptoms. She also reports having run out of folic acid 1 mg daily and has not refilled it; she says that she is doing well without it and does not want to restart it at this time. Note that in the past she had an oral sore and was thought to be due to folic acid and resolved when she increase folic acid to 2 mg daily. She says that she is ready to reduce methotrexate more. No morning stiffness. No joint pain.     She continues to follow with endocrinology for osteoporosis management.    Denies fevers, chills, nausea, vomiting, constipation, diarrhea. No abdominal pain. No chest pain/pressure, palpitations, or shortness of breath. No nasal or oral sores.   No neck pain. No rash. No LE swelling.      Tobacco: quit in 1999  EtOH: 1 glass of wine every month at most  Drugs: None  Occupation: , retired     ROS   GEN: No fevers, chills  SKIN: No itching, rashes, sores  HEENT:  No epistaxis. No oral or nasal ulcers.  CV: No chest pain, pressure, palpitations, or dyspnea on exertion.  PULM: No SOB, wheeze, cough.  GI:  No nausea, vomiting, constipation, diarrhea. No blood in stool. No abdominal pain.  : No blood in urine.  MSK: See HPI.  NEURO: No numbness or tingling  EXT: No LE swelling    Active Problem List     Patient Active Problem List   Diagnosis     RA (Rheumatoid Arthritis) off Plaquenil     Menopause     History of colonic polyps     Mitral Regurgitation     Multinodular goiter     CARDIOVASCULAR SCREENING; LDL GOAL LESS THAN 130     Psychophysiologic insomnia     Pulmonary nodule     PSEUDOPHAKIA OU     PVD (POSTERIOR VITREOUS DETACHMENT) OU     PXF (PSEUDOEXFOLIATION OF LENS CAPSULE) OD     GERD (gastroesophageal reflux disease)     Mild major depression (H)     Hyperlipidemia LDL goal <100     Advance Care Planning     Neuropathy (H)     SHERRY (obstructive sleep apnea)-severe (AHI 35)     zEncounter for counseling     Anemia of chronic disease     Migraine     Osteoporosis     Cornea guttata, ou     Conjunctival concretions     Stenosis, spinal, lumbar     Other chronic pain     Obesity     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     MGD (meibomian gland dysfunction)     Blepharitis of both eyes     Personal history of healed osteoporosis fracture     Iron malabsorption     Hyperglycemia     Chronic bilateral low back pain without sciatica     Allergic state, subsequent encounter     BMI 32.0-32.9,adult     Spinal stenosis of lumbar region without neurogenic claudication     Herniated nucleus pulposus, L3-4     S/P lumbar fusion     Anxiety     Past Medical History     Past Medical History:   Diagnosis Date     Acute posthemorrhagic anemia 10/13/2012     Ex-smoker  "01/99     History of blood transfusion      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     History of total knee replacement 7/23/2009     Menopause late 40's     Other chronic pain     joints     Pelvic fracture (H) 5/13/2014     PUD (peptic ulcer disease)      Rheumatoid arteritis      Sleep apnea     Uses a CPAP     Vitamin B12 deficiency      Past Surgical History     Past Surgical History:   Procedure Laterality Date     ABDOMEN SURGERY      c-sections     ARTHROSCOPY KNEE RT/LT  01/06    left     BACK SURGERY  2013    disc     BREAST BIOPSY, RT/LT      left benign     BREAST SURGERY  90's    lumpectomy     C ANESTH,TOTAL HIP ARTHROPLASTY  2010    Rt hip     C HAND/FINGER SURGERY UNLISTED  11/05    right hand     C NONSPECIFIC PROCEDURE  91    left foot surgery     C NONSPECIFIC PROCEDURE  95    R MCP surgery     C TOTAL KNEE ARTHROPLASTY  2006    left     C/SECTION, LOW TRANSVERSE  66,72    x 2     CATARACT IOL, RT/LT       COLONOSCOPY  2006,2009     EYE SURGERY      cataracs     HC ESOPH/GAS REFLUX TEST W NASAL IMPED >1 HR  2/1/2012    Procedure:ESOPHAGEAL IMPEDENCE FUNCTION TEST WITH 24 HOUR PH GREATER THAN 1 HOUR; Surgeon:KAYKAY JULIO; Location:[facility]     IR CAUDAL EPIDURAL INJECTION SINGLE  5/2/2012    LESI L5-S1 at [facility]     OPTICAL TRACKING SYSTEM FUSION POSTERIOR SPINE LUMBAR N/A 4/3/2017    Procedure: OPTICAL TRACKING SYSTEM FUSION SPINE POSTERIOR LUMBAR ONE LEVEL;  Surgeon: Anthony Michele MD;  Location:  OR     SURGICAL HISTORY OF -   2007    right knee total replacement     Allergy     Allergies   Allergen Reactions     Abatacept      Severe headaches     Adhesive Tape      Plastic tape     Celebrex [Celecoxib]      Ineffective     Ethanol      Antihistamines     Orencia [Abatacept]      Headache     Septra [Bactrim]      Sulfa Drugs      \"deathly ill\"     Tramadol      Headache     Current Medication List     Current Outpatient Prescriptions   Medication Sig     topiramate " (TOPAMAX) 25 MG tablet Take 2 tablets (50 mg) by mouth 2 times daily     zolpidem (AMBIEN) 5 MG tablet Take 1 tablet (5 mg) by mouth nightly as needed     pantoprazole (PROTONIX) 40 MG EC tablet TAKE ONE TABLET BY MOUTH ONCE DAILY 30-60 MINUTES BEFORE A MEAL     tofacitinib (XELJANZ XR) 11 MG 24 hr tablet Take 1 tablet (11 mg) by mouth daily     methotrexate 2.5 MG tablet Take 9 tablets (22.5 mg) by mouth once a week     cyclobenzaprine (FLEXERIL) 10 MG tablet Take 1 tablet (10 mg) by mouth 3 times daily as needed for muscle spasms     oxyCODONE (ROXICODONE) 5 MG IR tablet Take 1-2 tablets (5-10 mg) by mouth every 4 hours as needed for moderate to severe pain     sertraline (ZOLOFT) 25 MG tablet Take 25 mg by mouth daily      atorvastatin (LIPITOR) 40 MG tablet TAKE ONE TABLET BY MOUTH EVERY DAY     order for DME Respironics REMSTAR 60 Series Auto CPAP 12-15 cm H2O, Wisp nasal mask w/a s/m cushion     VITAMIN E PO Take 2,000 Units by mouth daily      Omega-3 Fatty Acids (OMEGA-3 FISH OIL PO) Take 2,000 mg by mouth daily      folic acid (FOLVITE) 1 MG tablet Take 1 tablet (1 mg) by mouth daily     multivitamin, therapeutic with minerals (MULTI-VITAMIN) TABS Take 1 tablet by mouth daily     magnesium oxide (MAG-OX) 400 (241.3 MG) MG tablet Take 1 tablet (400 mg) by mouth daily     propylene glycol (SYSTANE BALANCE) 0.6 % SOLN ophthalmic solution Place 1 drop into both eyes 4 times daily Reported on 5/10/2017     ferrous gluconate (FERGON) 324 (38 FE) MG tablet Take 1 tablet (324 mg) by mouth 3 times daily (with meals)     cyanocobalamin 500 MCG TABS Take 1 tablet by mouth 2 times daily     Calcium Citrate-Vitamin D (CALCIUM CITRATE + PO) Take 2 tablets by mouth daily     Cholecalciferol (VITAMIN D-3 PO) Take 1,000 Units by mouth 2 times daily     Ascorbic Acid 1000 MG TABS Take 1 tablet by mouth 3 times daily     acetaminophen (TYLENOL) 500 MG tablet Take 2 tablets (1,000 mg) by mouth every 8 hours as needed for pain  "    vitamin  B complex with vitamin C (VITAMIN  B COMPLEX) TABS Take 1 tablet by mouth daily.     aspirin 81 MG tablet Take 1 tablet by mouth daily.     azithromycin (ZITHROMAX) 250 MG tablet Two tablets first day, then one tablet daily for four days. (Patient not taking: Reported on 2017)     cetirizine (ZYRTEC) 10 MG tablet Take 10 mg by mouth daily     [DISCONTINUED] methotrexate 2.5 MG tablet Take 10 tablets (25 mg) by mouth once a week .  Split dose between AM and PM (5 tabs in the morning, and 5 tabs in the evening; all taken within the same day each week)     No current facility-administered medications for this visit.      Social History   See HPI    Family History     Family History   Problem Relation Age of Onset     Arthritis Mother      Alzheimer Disease Mother      Hyperlipidemia Mother      OSTEOPOROSIS Mother      HEART DISEASE Father      MI ( from this)     Alcohol/Drug Father      Arthritis Sister      Hypertension Sister      CANCER Son      DIABETES Son      Neurologic Disorder Sister      Schizophrenic     Hypertension Sister      Hyperlipidemia Sister      MENTAL ILLNESS Sister      DIABETES Son      Other Cancer Son      No change in family history since the previous clinic visit.    Physical Exam     Temp Readings from Last 3 Encounters:   17 97.5  F (36.4  C) (Oral)   17 97.9  F (36.6  C) (Oral)   17 97.5  F (36.4  C) (Oral)     BP Readings from Last 5 Encounters:   17 111/68   17 122/62   17 115/72   17 130/66   17 109/69     Pulse Readings from Last 1 Encounters:   17 77     Resp Readings from Last 1 Encounters:   17 16     Estimated body mass index is 29.89 kg/(m^2) as calculated from the following:    Height as of this encounter: 1.67 m (5' 5.75\").    Weight as of this encounter: 83.4 kg (183 lb 12.8 oz).    GEN: NAD, overweight  HEENT: MMM. No oral lesions. Anicteric, noninjected sclera  CV: S1, S2. RRR. No " m/r/g.  PULM: CTA bilaterally. No w/c.  MSK: Subtle synovial swelling of the bilateral second-third MCPs were nontender to palpation. Mild subluxation of the bilateral MCPs. PIPs without swelling or tenderness to palpation. Right hand with well healed old surgical scars over the MCPs. Bilateral wrists without synovial swelling or tenderness to palpation. Elbows and shoulders without swelling or tenderness to palpation.  Bilateral hips nontender to palpation.  Bilateral knees and ankles without swelling or tenderness palpation. Negative MTP squeeze.   SKIN: No rash  EXT: No LE edema  PSYCH: Alert. Appropriate.    Labs   RF/CCP  Recent Labs   Lab Test  09/09/11   0843  03/20/09   1211   CYCLICCITPEP   --   >100 Interpretation:  Positive*   RHF  38*  163*     CBC  Recent Labs   Lab Test  08/04/17   1026  05/05/17   1046  04/04/17   0922   02/08/17   1241   WBC  5.8  6.7  9.0   < >  4.6   RBC  3.86  3.92  3.27*   < >  3.65*   HGB  11.9  12.0  10.2*   < >  11.5*   HCT  37.0  38.4  32.6*   < >  36.4   MCV  96  98  100   < >  100   RDW  16.5*  15.7*  14.8   < >  15.9*   PLT  344  408  313   < >  364   MCH  30.8  30.6  31.2   < >  31.5   MCHC  32.2  31.3*  31.3*   < >  31.6   NEUTROPHIL  58.9  60.8   --    --   54.8   LYMPH  25.1  23.1   --    --   25.2   MONOCYTE  12.2  9.7   --    --   13.7   EOSINOPHIL  3.6  6.3   --    --   6.1   BASOPHIL  0.2  0.1   --    --   0.2   ANEU  3.4  4.1   --    --   2.5   ALYM  1.5  1.5   --    --   1.2   MARYURI  0.7  0.7   --    --   0.6   AEOS  0.2  0.4   --    --   0.3   ABAS  0.0  0.0   --    --   0.0    < > = values in this interval not displayed.     CMP  Recent Labs   Lab Test  08/04/17   1026  05/05/17   1046  03/27/17   1142  02/08/17   1241  01/27/17   1511  10/28/16   1239   NA   --    --   139   --   142  143   POTASSIUM   --    --   4.0   --   4.1  4.3   CHLORIDE   --    --   107   --   107  109   CO2   --    --   25   --   25  27   ANIONGAP   --    --   7   --   10  7   GLC   --     --   96   --   98  97   BUN   --    --   11   --   10  11   CR  0.54  0.59  0.62  0.80  0.62  0.70   GFRESTIMATED  >90  Non  GFR Calc    >90  Non  GFR Calc    >90  Non  GFR Calc    70  >90  Non  GFR Calc    81   GFRESTBLACK  >90   GFR Calc    >90   GFR Calc    >90   GFR Calc    85  >90   GFR Calc    >90   GFR Calc     BRANDEE   --    --   9.7   --   9.9  9.4   BILITOTAL  0.3  0.2   --   0.2  0.3  0.4   ALBUMIN  3.7  3.6   --   3.6  3.8  4.0   PROTTOTAL  7.8  8.3   --   7.4  7.6  7.6   ALKPHOS  50  61   --   41  45  46   AST  13  22   --   21  35  22   ALT  24  25   --   37  39  31     Calcium/VitaminD  Recent Labs   Lab Test  03/27/17   1142  01/27/17   1511  10/28/16   1239   02/05/13   1050   BRANDEE  9.7  9.9  9.4   < >  9.3   VITDT   --    --   37   --   33    < > = values in this interval not displayed.     ESR/CRP  Recent Labs   Lab Test  08/04/17   1026  05/05/17   1046  02/08/17   1241   SED  31*  38*  34*   CRP  <2.9  4.8  7.0     Lipid Panel  Recent Labs   Lab Test  10/28/16   1238  12/30/15   0842  01/13/14   1109  07/24/13   1024   04/13/12   0831   CHOL  176  182  135  177   --   167   TRIG  103  70  89  80   --   115   HDL  77  92  49*  89   --   59   LDL  78  76  68  72   < >  85   VLDL   --    --   18  16   --   23   CHOLHDLRATIO   --    --   2.8  2.0   --   2.8   NHDL  99  90   --    --    --    --     < > = values in this interval not displayed.     Hepatitis B  Recent Labs   Lab Test  09/15/15   1159  04/30/12   1005   AUSAB  0.11   --    HBCAB  Nonreactive   --    HEPBANG  Nonreactive  Negative     Hepatitis C  Recent Labs   Lab Test  09/15/15   1159  04/30/12   1005   HCVAB  Nonreactive   Assay performance characteristics have not been established for newborns,   infants, and children    Negative     Tuberculosis Screening  Recent Labs   Lab Test  09/15/15    "1200  04/30/12   1006   TBRSLT  Negative  Negative   TBAGN  0.00  0.00       \"HAND BILATERAL THREE OR MORE VIEWS 9/15/2015 12:28 PM   HISTORY: Rheumatoid arthritis; establish baseline. Rheumatoid  arthritis(714.0)  COMPARISON: None  IMPRESSION  IMPRESSION: There is diffuse osteopenia. Postoperative changes of the  right second and third metacarpophalangeal joints. Moderate joint  space narrowing involving the left second and third  metacarpophalangeal joints. Mild joint space narrowing of the right  fourth and fifth metacarpophalangeal joints. There are also small  periarticular erosive changes of the metacarpophalangeal joints. There  is fusion of several of the right carpal bones. Marked joint space  loss in the left wrist. Findings are consistent with the clinical  history of rheumatoid arthritis. Chronic fracture deformities of the  right distal radius and ulna. No acute fracture is seen.  SPENCER MONSALVE MD\"    Immunization History     Immunization History   Administered Date(s) Administered     Influenza (High Dose) 3 valent vaccine 10/28/2013, 09/23/2014, 11/11/2015, 09/23/2016     Influenza (IIV3) 10/14/2007, 10/21/2009     Mantoux 11/03/2006     Pneumococcal (PCV 13) 07/29/2015     Pneumococcal 23 valent 06/26/2000, 06/02/2010     TD (ADULT, 7+) 07/20/2009          Chart documentation done in part with Dragon Voice recognition Software. Although reviewed after completion, some word and grammatical error may remain.    Nico Byers MD  "

## 2017-08-14 ENCOUNTER — TRANSFERRED RECORDS (OUTPATIENT)
Dept: HEALTH INFORMATION MANAGEMENT | Facility: CLINIC | Age: 77
End: 2017-08-14

## 2017-08-14 LAB — PHQ9 SCORE: 2

## 2017-09-05 ENCOUNTER — TRANSFERRED RECORDS (OUTPATIENT)
Dept: HEALTH INFORMATION MANAGEMENT | Facility: CLINIC | Age: 77
End: 2017-09-05

## 2017-09-21 ENCOUNTER — RADIANT APPOINTMENT (OUTPATIENT)
Dept: GENERAL RADIOLOGY | Facility: CLINIC | Age: 77
End: 2017-09-21
Attending: NURSE PRACTITIONER
Payer: COMMERCIAL

## 2017-09-21 ENCOUNTER — OFFICE VISIT (OUTPATIENT)
Dept: NEUROSURGERY | Facility: CLINIC | Age: 77
End: 2017-09-21
Payer: COMMERCIAL

## 2017-09-21 VITALS
BODY MASS INDEX: 30.58 KG/M2 | HEART RATE: 85 BPM | OXYGEN SATURATION: 95 % | SYSTOLIC BLOOD PRESSURE: 118 MMHG | DIASTOLIC BLOOD PRESSURE: 73 MMHG | WEIGHT: 188 LBS | TEMPERATURE: 97.1 F

## 2017-09-21 DIAGNOSIS — Z98.1 S/P LUMBAR FUSION: ICD-10-CM

## 2017-09-21 PROCEDURE — 72100 X-RAY EXAM L-S SPINE 2/3 VWS: CPT

## 2017-09-21 PROCEDURE — 99213 OFFICE O/P EST LOW 20 MIN: CPT | Performed by: NURSE PRACTITIONER

## 2017-09-21 RX ORDER — CYCLOBENZAPRINE HCL 10 MG
10 TABLET ORAL 2 TIMES DAILY PRN
Qty: 60 TABLET | Refills: 2 | Status: SHIPPED | OUTPATIENT
Start: 2017-09-21 | End: 2019-06-05

## 2017-09-21 NOTE — PATIENT INSTRUCTIONS
- Followup in 6 months with xray prior   - Call the clinic at [phone] for increased pain or any other questions and grisel

## 2017-09-21 NOTE — NURSING NOTE
"Ginger Marshall is a 77 year old female who presents for:  Chief Complaint   Patient presents with     Neurologic Problem     s/p lumbar fusion 4-3-17        Initial Vitals:  There were no vitals taken for this visit. Estimated body mass index is 29.89 kg/(m^2) as calculated from the following:    Height as of 8/9/17: 5' 5.75\" (1.67 m).    Weight as of 8/9/17: 183 lb 12.8 oz (83.4 kg).. There is no height or weight on file to calculate BSA. BP completed using cuff size: large  Data Unavailable    Do you feel safe in your environment?  Yes  Do you need any refills today? No    Nursing Comments: s/p lumbar fusion 4-3-17, she rates her pain today as 0        Sujatha Campbell CMA,AAS    "

## 2017-09-21 NOTE — PROGRESS NOTES
[facility]  Neurosurgery followup:    HPI: Patient is s/p L3 Smith-Rodriguez osteotomy with L3-4 transforaminal lumbar interbody fusion with Dr. Anthony Michele on 4/3/17.  She is here for her 6 month post-op appointment. She denies pain other than aching of the left leg at night when she goes to bed.  She denies weakness or falls.  She states she takes Flexeril in the pm and this seems to help.  She has engaged in regular activities since her last appointment and it seems to be going well.  She denies any other concerns.  Exam:    Constitutional:  Alert, well nourished, NAD.  HEENT: Normocephalic, atraumatic.   Pulm:  Without shortness of breath   CV:  No pitting edema of BLE.      Neurological:  Awake  Alert  Oriented x 3  Motor exam:        IP Q DF PF EHL  R   5  5   5   5    5  L   5  5   5   5    5     Able to spontaneously move L/E bilaterally  Sensation intact throughout all L/E dermatomes     Imaging: Per Xray fusion stable    A/P:   Patient is s/p L3 Smith-Rodriguez osteotomy with L3-4 transforaminal lumbar interbody fusion with Dr. Anthony Michele on 4/3/17.  She is here for her 6 month post-op appointment. She denies pain other than aching of the left leg at night when she goes to bed.  She denies weakness or falls.  She states she takes Flexeril in the pm and this seems to help.  She has engaged in regular activities since her last appointment and it seems to be going well.  She is going to plan to follow up in 6 months for her one year post-op appointment and if any concerns will contact us sooner.    Patient Instructions   - Followup in 6 months with xray prior   - Call the clinic at [phone] for increased pain or any other questions and grisel    Greater than 50% of this 20 minute visit was spent in face to face evaluation and discussion of POC.     Carmen Fierro [facility]  [facility]  [address]  [address] [place] [id number]    Tel  [phone]  Pager [phone]

## 2017-10-30 DIAGNOSIS — M05.79 RHEUMATOID ARTHRITIS INVOLVING MULTIPLE SITES WITH POSITIVE RHEUMATOID FACTOR (H): Primary | ICD-10-CM

## 2017-10-31 NOTE — TELEPHONE ENCOUNTER
Prednisone  20mg    Last Written Prescription Date:  ?  Last Fill Quantity: ?,   # refills: ?  Future Office visit:    Next 5 appointments (look out 90 days)     Nov 08, 2017 10:40 AM CST   Return Visit with Nico Byers MD   [facility] [place] ([facility] [place])    [address] [facility]  [place] [address]   [phone]                   Routing refill request to provider for review/approval because:  Drug not active on patient's medication list

## 2017-10-31 NOTE — TELEPHONE ENCOUNTER
Routing refill request to provider for review/approval because:  Drug not on the [facility] refill protocol     Kelli Cage RN

## 2017-11-02 RX ORDER — PREDNISONE 20 MG/1
TABLET ORAL
Qty: 5 TABLET | Refills: 1 | Status: SHIPPED | OUTPATIENT
Start: 2017-11-02 | End: 2017-12-14

## 2017-11-02 NOTE — TELEPHONE ENCOUNTER
Rheumatology team: Please call to notify Ms. Sweats that the prednisone Rx has been sent.    Nico Byers MD  11/2/2017 7:19 AM

## 2017-11-06 DIAGNOSIS — M05.79 RHEUMATOID ARTHRITIS INVOLVING MULTIPLE SITES WITH POSITIVE RHEUMATOID FACTOR (H): ICD-10-CM

## 2017-11-06 DIAGNOSIS — Z79.899 HIGH RISK MEDICATIONS (NOT ANTICOAGULANTS) LONG-TERM USE: ICD-10-CM

## 2017-11-06 LAB
ALBUMIN SERPL-MCNC: 3.4 G/DL (ref 3.4–5)
ALP SERPL-CCNC: 50 U/L (ref 40–150)
ALT SERPL W P-5'-P-CCNC: 24 U/L (ref 0–50)
AST SERPL W P-5'-P-CCNC: 16 U/L (ref 0–45)
BASOPHILS # BLD AUTO: 0 10E9/L (ref 0–0.2)
BASOPHILS NFR BLD AUTO: 0.1 %
BILIRUB DIRECT SERPL-MCNC: <0.1 MG/DL (ref 0–0.2)
BILIRUB SERPL-MCNC: 0.3 MG/DL (ref 0.2–1.3)
CREAT SERPL-MCNC: 0.59 MG/DL (ref 0.52–1.04)
CRP SERPL-MCNC: 4.6 MG/L (ref 0–8)
DIFFERENTIAL METHOD BLD: NORMAL
EOSINOPHIL # BLD AUTO: 0.3 10E9/L (ref 0–0.7)
EOSINOPHIL NFR BLD AUTO: 4.2 %
ERYTHROCYTE [DISTWIDTH] IN BLOOD BY AUTOMATED COUNT: 14.2 % (ref 10–15)
ERYTHROCYTE [SEDIMENTATION RATE] IN BLOOD BY WESTERGREN METHOD: 31 MM/H (ref 0–30)
GFR SERPL CREATININE-BSD FRML MDRD: >90 ML/MIN/1.7M2
HCT VFR BLD AUTO: 38.8 % (ref 35–47)
HGB BLD-MCNC: 12.5 G/DL (ref 11.7–15.7)
LYMPHOCYTES # BLD AUTO: 1.3 10E9/L (ref 0.8–5.3)
LYMPHOCYTES NFR BLD AUTO: 18 %
MCH RBC QN AUTO: 31.4 PG (ref 26.5–33)
MCHC RBC AUTO-ENTMCNC: 32.2 G/DL (ref 31.5–36.5)
MCV RBC AUTO: 98 FL (ref 78–100)
MONOCYTES # BLD AUTO: 0.9 10E9/L (ref 0–1.3)
MONOCYTES NFR BLD AUTO: 12.4 %
NEUTROPHILS # BLD AUTO: 4.8 10E9/L (ref 1.6–8.3)
NEUTROPHILS NFR BLD AUTO: 65.3 %
PLATELET # BLD AUTO: 368 10E9/L (ref 150–450)
PROT SERPL-MCNC: 7.5 G/DL (ref 6.8–8.8)
RBC # BLD AUTO: 3.98 10E12/L (ref 3.8–5.2)
WBC # BLD AUTO: 7.4 10E9/L (ref 4–11)

## 2017-11-06 PROCEDURE — 82565 ASSAY OF CREATININE: CPT | Performed by: INTERNAL MEDICINE

## 2017-11-06 PROCEDURE — 86140 C-REACTIVE PROTEIN: CPT | Performed by: INTERNAL MEDICINE

## 2017-11-06 PROCEDURE — 85025 COMPLETE CBC W/AUTO DIFF WBC: CPT | Performed by: INTERNAL MEDICINE

## 2017-11-06 PROCEDURE — 80076 HEPATIC FUNCTION PANEL: CPT | Performed by: INTERNAL MEDICINE

## 2017-11-06 PROCEDURE — 85652 RBC SED RATE AUTOMATED: CPT | Performed by: INTERNAL MEDICINE

## 2017-11-06 PROCEDURE — 36415 COLL VENOUS BLD VENIPUNCTURE: CPT | Performed by: INTERNAL MEDICINE

## 2017-11-08 ENCOUNTER — OFFICE VISIT (OUTPATIENT)
Dept: RHEUMATOLOGY | Facility: CLINIC | Age: 77
End: 2017-11-08
Payer: COMMERCIAL

## 2017-11-08 VITALS
HEART RATE: 117 BPM | OXYGEN SATURATION: 95 % | BODY MASS INDEX: 29.12 KG/M2 | SYSTOLIC BLOOD PRESSURE: 129 MMHG | DIASTOLIC BLOOD PRESSURE: 80 MMHG | HEIGHT: 66 IN | WEIGHT: 181.2 LBS

## 2017-11-08 DIAGNOSIS — Z79.899 HIGH RISK MEDICATIONS (NOT ANTICOAGULANTS) LONG-TERM USE: ICD-10-CM

## 2017-11-08 DIAGNOSIS — M05.79 RHEUMATOID ARTHRITIS INVOLVING MULTIPLE SITES WITH POSITIVE RHEUMATOID FACTOR (H): Primary | ICD-10-CM

## 2017-11-08 PROCEDURE — 99213 OFFICE O/P EST LOW 20 MIN: CPT | Performed by: INTERNAL MEDICINE

## 2017-11-08 RX ORDER — METHOTREXATE SODIUM 2.5 MG/1
22.5 TABLET ORAL WEEKLY
Qty: 108 TABLET | Refills: 0 | Status: SHIPPED | OUTPATIENT
Start: 2017-11-08 | End: 2018-02-07

## 2017-11-08 NOTE — PROGRESS NOTES
Rheumatology Clinic Visit      Ginger Marshall MRN# [id number]   YOB: 1940 Age: 77 year old      Date of visit: 11/08/17   PCP: Georgia Vázquez MD     Chief Complaint   Patient presents with:  Arthritis: RA, patient states she feels fine with the xeljanz, only hurts when the weather goes up and down, mainly then in jaw and shoulders    Assessment and Plan   1. Seropositive ( [2009], CCP >100 [2009]; hx of rheumatoid nodule by 6/14/2011 pathology) Erosive Rheumatoid Arthritis: Previously failed remicade (staph infection during therapy, but was immediately after a joint injection) and orencia (migraines). Currently on methotrexate 22.5 mg once weekly, folic acid 800 mcg daily, Xeljanz XR 11mg daily. She also has prednisone 20 mg daily ×5 days to use as needed for flare. Methotrexate was reduced with worsening of her symptoms has since been increased again to 22.5 mg once weekly. Note that she had a history of oral sore that resolved with folic acid 2mg daily; she ran out of folic acid and did not want to restart it yet; she takes other supplements that total 800mcg of folic acid; if needed in the future may restart additional folic acid.    - Continue methotrexate 22.5 mg once weekly  - Continue folic acid 800mcg daily as noted above (from over-the-counter supplements)  - Continue Xeljanz XR 11mg daily  - PRN Flare: prednisone 20mg daily x5days  - Labs 2-3 days prior to the next rheumatology clinic visit: CBC, Creatinine, Hepatic Panel, ESR, CRP              Rapid 3, cumulative scores                       11/08/2017: 5 (MTX 22.5mg wkly, Xeljanz XR 11mg daily) had reduced MTX with worsening symptoms                      08/09/2017: 0 (MTX 22.5mg wkly, Xeljanz XR 11mg daily)                      05/10/2017: 5 (MTX 25mg wkly, Xeljanz XR 11mg daily) RA doing well          2. Right hip pain: Status post WALTER twice in the past. Followed by [facility] orthopedics.     3. Chronic back pain, improved s/p  fusion: She has been to an orthopedic surgeon told her that she needed an operation but then when she went to see her orthopedic surgeon that did the hip replacement he told her not to have the surgery. She then went to see Dr. Michele, neurosurgeon, and had a lumbar fusion with significant improvement.  Now with some thoracic back pain that is likely secondary to muscle spasm and she says that this improves with cyclobenzaprine 10 mg but that dose makes her take a nap so I recommended that she try using 5 mg instead of 10mg.     4. Bone Health: Managed by Endocrinology.     5. Iron Deficiency Anemia: Managed by PCP.     Ms. Marshall verbalized agreement with and understanding of the rational for the diagnosis and treatment plan.  All questions were answered to best of my ability and the patient's satisfaction. Ms. Marshall was advised to contact the clinic with any questions that may arise after the clinic visit.      Thank you for involving me in the care of the patient    Return to clinic: 3 months    HPI   Ginger Marshall is a 77 year old female with a history of multiple foot surgeries, hand tendon transfers, MCP replacements (most recent being in August 2015), bilateral TKA, right WALTER, left distal radius fracture history, s/p lumbar fusion, osteoporosis and seropositive (RF+, CCP+) erosive rheumatoid arthritis who presents for follow-up of rheumatoid arthritis.      Today, Ms. Marshall reports that her rheumatoid arthritis is doing great since methotrexate has been increased again. Since last seen, methotrexate dose reduction, as well as worsening symptoms so she had used prednisone in the methotrexate was again increased. She is having some thoracic back pain that is treated with cyclobenzaprine 10 mg causes her to take a nap but does help with the pain. She says that she mentioned this at her spine surgery follow-up appointment. Currently with morning stiffness for about 10 minutes.    She continues to follow with  endocrinology for osteoporosis management.    Denies fevers, chills, nausea, vomiting, constipation, diarrhea. No abdominal pain. No chest pain/pressure, palpitations, or shortness of breath. No nasal or oral sores.   No neck pain. No rash. No LE swelling.     Tobacco: quit in 1999  EtOH: 1 glass of wine every month at most  Drugs: None  Occupation: , retired     ROS   GEN: No fevers, chills  SKIN: No itching, rashes, sores  HEENT:  No oral or nasal ulcers.  CV: No chest pain, pressure, palpitations, or dyspnea on exertion.  PULM: No SOB, wheeze, cough.  GI:  No nausea, vomiting, constipation, diarrhea. No blood in stool. No abdominal pain.  : No blood in urine.  MSK: See HPI.  NEURO: No numbness or tingling  EXT: No LE swelling    Active Problem List     Patient Active Problem List   Diagnosis     RA (Rheumatoid Arthritis) off Plaquenil     Menopause     History of colonic polyps     Mitral Regurgitation     Multinodular goiter     CARDIOVASCULAR SCREENING; LDL GOAL LESS THAN 130     Psychophysiologic insomnia     Pulmonary nodule     PSEUDOPHAKIA OU     PVD (POSTERIOR VITREOUS DETACHMENT) OU     PXF (PSEUDOEXFOLIATION OF LENS CAPSULE) OD     GERD (gastroesophageal reflux disease)     Mild major depression (H)     Hyperlipidemia LDL goal <100     Advance Care Planning     Neuropathy     SHERRY (obstructive sleep apnea)-severe (AHI 35)     zEncounter for counseling     Anemia of chronic disease     Migraine     Osteoporosis     Cornea guttata, ou     Conjunctival concretions     Stenosis, spinal, lumbar     Other chronic pain     Obesity     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     MGD (meibomian gland dysfunction)     Blepharitis of both eyes     Personal history of healed osteoporosis fracture     Iron malabsorption     Hyperglycemia     Chronic bilateral low back pain without sciatica     Allergic state, subsequent encounter     BMI 32.0-32.9,adult     Spinal stenosis of lumbar  region without neurogenic claudication     Herniated nucleus pulposus, L3-4     S/P lumbar fusion     Anxiety     Past Medical History     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     History of total knee replacement 7/23/2009     Menopause late 40's     Other chronic pain     joints     Pelvic fracture (H) 5/13/2014     PUD (peptic ulcer disease)      Rheumatoid arteritis      Sleep apnea     Uses a CPAP     Vitamin B12 deficiency      Past Surgical History     Past Surgical History:   Procedure Laterality Date     ABDOMEN SURGERY      c-sections     ARTHROSCOPY KNEE RT/LT  01/06    left     BACK SURGERY  2013    disc     BREAST BIOPSY, RT/LT      left benign     BREAST SURGERY  90's    lumpectomy     C ANESTH,TOTAL HIP ARTHROPLASTY  2010    Rt hip     C HAND/FINGER SURGERY UNLISTED  11/05    right hand     C NONSPECIFIC PROCEDURE  91    left foot surgery     C NONSPECIFIC PROCEDURE  95    R MCP surgery     C TOTAL KNEE ARTHROPLASTY  2006    left     C/SECTION, LOW TRANSVERSE  66,72    x 2     CATARACT IOL, RT/LT       COLONOSCOPY  2006,2009     EYE SURGERY      cataracs     HC ESOPH/GAS REFLUX TEST W NASAL IMPED >1 HR  2/1/2012    Procedure:ESOPHAGEAL IMPEDENCE FUNCTION TEST WITH 24 HOUR PH GREATER THAN 1 HOUR; Surgeon:KAYKAY JULIO; Location:[facility]     IR CAUDAL EPIDURAL INJECTION SINGLE  5/2/2012    [facility] L5-S1 at [facility]     OPTICAL TRACKING SYSTEM FUSION POSTERIOR SPINE LUMBAR N/A 4/3/2017    Procedure: OPTICAL TRACKING SYSTEM FUSION SPINE POSTERIOR LUMBAR ONE LEVEL;  Surgeon: Anthony Michele MD;  Location:  OR     SURGICAL HISTORY OF -   2007    right knee total replacement     Allergy     Allergies   Allergen Reactions     Abatacept      Severe headaches     Adhesive Tape      Plastic tape     Celebrex [Celecoxib]      Ineffective     Ethanol       "Antihistamines     Orencia [Abatacept]      Headache     Septra [Bactrim]      Sulfa Drugs      \"deathly ill\"     Tramadol      Headache     Current Medication List     Current Outpatient Prescriptions   Medication Sig     Probiotic Product (PROBIOTIC DAILY PO)      predniSONE (DELTASONE) 20 MG tablet PRN Rheumatoid arthritis flare: prednisone 20mg daily x5days.     methotrexate 2.5 MG tablet Take 9 tablets (22.5 mg) by mouth once a week     cyclobenzaprine (FLEXERIL) 10 MG tablet Take 1 tablet (10 mg) by mouth 2 times daily as needed for muscle spasms     topiramate (TOPAMAX) 25 MG tablet Take 2 tablets (50 mg) by mouth 2 times daily     zolpidem (AMBIEN) 5 MG tablet Take 1 tablet (5 mg) by mouth nightly as needed     pantoprazole (PROTONIX) 40 MG EC tablet TAKE ONE TABLET BY MOUTH ONCE DAILY 30-60 MINUTES BEFORE A MEAL     tofacitinib (XELJANZ XR) 11 MG 24 hr tablet Take 1 tablet (11 mg) by mouth daily     cetirizine (ZYRTEC) 10 MG tablet Take 10 mg by mouth daily     atorvastatin (LIPITOR) 40 MG tablet TAKE ONE TABLET BY MOUTH EVERY DAY     VITAMIN E PO Take 2,000 Units by mouth daily      Omega-3 Fatty Acids (OMEGA-3 FISH OIL PO) Take 2,000 mg by mouth daily      multivitamin, therapeutic with minerals (MULTI-VITAMIN) TABS Take 1 tablet by mouth daily     magnesium oxide (MAG-OX) 400 (241.3 MG) MG tablet Take 1 tablet (400 mg) by mouth daily     propylene glycol (SYSTANE BALANCE) 0.6 % SOLN ophthalmic solution Place 1 drop into both eyes 4 times daily Reported on 5/10/2017     ferrous gluconate (FERGON) 324 (38 FE) MG tablet Take 1 tablet (324 mg) by mouth 3 times daily (with meals)     cyanocobalamin 500 MCG TABS Take 1 tablet by mouth 2 times daily     Calcium Citrate-Vitamin D (CALCIUM CITRATE + PO) Take 2 tablets by mouth daily     Cholecalciferol (VITAMIN D-3 PO) Take 1,000 Units by mouth 2 times daily     Ascorbic Acid 1000 MG TABS Take 1 tablet by mouth 3 times daily     acetaminophen (TYLENOL) 500 MG " tablet Take 2 tablets (1,000 mg) by mouth every 8 hours as needed for pain     vitamin  B complex with vitamin C (VITAMIN  B COMPLEX) TABS Take 1 tablet by mouth daily.     aspirin 81 MG tablet Take 1 tablet by mouth daily.     azithromycin (ZITHROMAX) 250 MG tablet Two tablets first day, then one tablet daily for four days. (Patient not taking: Reported on 2017)     oxyCODONE (ROXICODONE) 5 MG IR tablet Take 1-2 tablets (5-10 mg) by mouth every 4 hours as needed for moderate to severe pain (Patient not taking: Reported on 2017)     sertraline (ZOLOFT) 25 MG tablet Take 25 mg by mouth daily      order for [facility] REMSTAR 60 Series Auto CPAP 12-15 cm H2O, Wisp nasal mask w/a s/m cushion     [DISCONTINUED] methotrexate 2.5 MG tablet Take 10 tablets (25 mg) by mouth once a week .  Split dose between AM and PM (5 tabs in the morning, and 5 tabs in the evening; all taken within the same day each week)     No current facility-administered medications for this visit.      Social History   See HPI    Family History     Family History   Problem Relation Age of Onset     Arthritis Mother      Alzheimer Disease Mother      Hyperlipidemia Mother      OSTEOPOROSIS Mother      HEART DISEASE Father      MI ( from this)     Alcohol/Drug Father      Arthritis Sister      Hypertension Sister      CANCER Son      DIABETES Son      Neurologic Disorder Sister      Schizophrenic     Hypertension Sister      Hyperlipidemia Sister      MENTAL ILLNESS Sister      DIABETES Son      Other Cancer Son      No change in family history since the previous clinic visit.    Physical Exam     Temp Readings from Last 3 Encounters:   17 97.1  F (36.2  C) (Oral)   17 97.5  F (36.4  C) (Oral)   17 97.9  F (36.6  C) (Oral)     BP Readings from Last 5 Encounters:   17 129/80   17 118/73   17 111/68   17 122/62   17 115/72     Pulse Readings from Last 1 Encounters:   17 117     Resp  "Readings from Last 1 Encounters:   06/22/17 16     Estimated body mass index is 29.47 kg/(m^2) as calculated from the following:    Height as of this encounter: 1.67 m (5' 5.75\").    Weight as of this encounter: 82.2 kg (181 lb 3.2 oz).    GEN: NAD, overweight  HEENT: MMM. No oral lesions. Anicteric, noninjected sclera  CV: S1, S2. RRR. No m/r/g.  PULM: CTA bilaterally. No w/c.  MSK: MCPs, PIPs, wrists, elbows, shoulders, knees, ankles, and MTPs without swelling or tenderness to palpation. Mild subluxation of the bilateral MCPs. Right hand with old surgical scars over the MCPs. Paraspinal muscle tenseness and tenderness to palpation in the lumbar and thoracic areas.  SKIN: No rash  EXT: No LE edema  PSYCH: Alert. Appropriate. Laughs a lot during the appointment.    Labs   RF/CCP  Recent Labs   Lab Test  09/09/11   0843   RHF  38*     CBC  Recent Labs   Lab Test  11/06/17   0952  08/04/17   1026  05/05/17   1046   WBC  7.4  5.8  6.7   RBC  3.98  3.86  3.92   HGB  12.5  11.9  12.0   HCT  38.8  37.0  38.4   MCV  98  96  98   RDW  14.2  16.5*  15.7*   PLT  368  344  408   MCH  31.4  30.8  30.6   MCHC  32.2  32.2  31.3*   NEUTROPHIL  65.3  58.9  60.8   LYMPH  18.0  25.1  23.1   MONOCYTE  12.4  12.2  9.7   EOSINOPHIL  4.2  3.6  6.3   BASOPHIL  0.1  0.2  0.1   ANEU  4.8  3.4  4.1   ALYM  1.3  1.5  1.5   MARYURI  0.9  0.7  0.7   AEOS  0.3  0.2  0.4   ABAS  0.0  0.0  0.0     CMP  Recent Labs   Lab Test  11/06/17   0952  08/04/17   1026  05/05/17   1046  03/27/17   1142   01/27/17   1511  10/28/16   1239   NA   --    --    --   139   --   142  143   POTASSIUM   --    --    --   4.0   --   4.1  4.3   CHLORIDE   --    --    --   107   --   107  109   CO2   --    --    --   25   --   25  27   ANIONGAP   --    --    --   7   --   10  7   GLC   --    --    --   96   --   98  97   BUN   --    --    --   11   --   10  11   CR  0.59  0.54  0.59  0.62   < >  0.62  0.70   GFRESTIMATED  >90  >90  Non African American GFR Calc    >90  Non "  GFR Calc    >90  Non  GFR Calc     < >  >90  Non  GFR Calc    81   GFRESTBLACK  >90  >90  African American GFR Calc    >90   GFR Calc    >90   GFR Calc     < >  >90   GFR Calc    >90   GFR Calc     BRANDEE   --    --    --   9.7   --   9.9  9.4   BILITOTAL  0.3  0.3  0.2   --    < >  0.3  0.4   ALBUMIN  3.4  3.7  3.6   --    < >  3.8  4.0   PROTTOTAL  7.5  7.8  8.3   --    < >  7.6  7.6   ALKPHOS  50  50  61   --    < >  45  46   AST  16  13  22   --    < >  35  22   ALT  24  24  25   --    < >  39  31    < > = values in this interval not displayed.     Calcium/VitaminD  Recent Labs   Lab Test  03/27/17   1142  01/27/17   1511  10/28/16   1239   02/05/13   1050   BRANDEE  9.7  9.9  9.4   < >  9.3   VITDT   --    --   37   --   33    < > = values in this interval not displayed.     ESR/CRP  Recent Labs   Lab Test  11/06/17   0952  08/04/17   1026  05/05/17   1046   SED  31*  31*  38*   CRP  4.6  <2.9  4.8     Lipid Panel  Recent Labs   Lab Test  10/28/16   1238  12/30/15   0842  01/13/14   1109  07/24/13   1024   04/13/12   0831   CHOL  176  182  135  177   --   167   TRIG  103  70  89  80   --   115   HDL  77  92  49*  89   --   59   LDL  78  76  68  72   < >  85   VLDL   --    --   18  16   --   23   CHOLHDLRATIO   --    --   2.8  2.0   --   2.8   NHDL  99  90   --    --    --    --     < > = values in this interval not displayed.     Hepatitis B  Recent Labs   Lab Test  09/15/15   1159  04/30/12   1005   AUSAB  0.11   --    HBCAB  Nonreactive   --    HEPBANG  Nonreactive  Negative     Hepatitis C  Recent Labs   Lab Test  09/15/15   1159  04/30/12   1005   HCVAB  Nonreactive   Assay performance characteristics have not been established for newborns,   infants, and children    Negative     Tuberculosis Screening  Recent Labs   Lab Test  09/15/15   1200  04/30/12   1006   TBRSLT  Negative  Negative   TBAGN  0.00   "0.00     \"HAND BILATERAL THREE OR MORE VIEWS 9/15/2015 12:28 PM   HISTORY: Rheumatoid arthritis; establish baseline. Rheumatoid  arthritis(714.0)  COMPARISON: None  IMPRESSION  IMPRESSION: There is diffuse osteopenia. Postoperative changes of the  right second and third metacarpophalangeal joints. Moderate joint  space narrowing involving the left second and third  metacarpophalangeal joints. Mild joint space narrowing of the right  fourth and fifth metacarpophalangeal joints. There are also small  periarticular erosive changes of the metacarpophalangeal joints. There  is fusion of several of the right carpal bones. Marked joint space  loss in the left wrist. Findings are consistent with the clinical  history of rheumatoid arthritis. Chronic fracture deformities of the  right distal radius and ulna. No acute fracture is seen.  SPENCER MONSALVE MD\"    Immunization History     Immunization History   Administered Date(s) Administered     Influenza (High Dose) 3 valent vaccine 10/28/2013, 09/23/2014, 11/11/2015, 09/23/2016     Influenza (IIV3) 10/14/2007, 10/21/2009     Mantoux 11/03/2006     Pneumococcal (PCV 13) 07/29/2015     Pneumococcal 23 valent 06/26/2000, 06/02/2010     TD (ADULT, 7+) 07/20/2009          Chart documentation done in part with Dragon Voice recognition Software. Although reviewed after completion, some word and grammatical error may remain.    Nico Byers MD  "

## 2017-11-08 NOTE — MR AVS SNAPSHOT
After Visit Summary   11/8/2017    Ginger Marshall    MRN: [id number]           Patient Information     Date Of Birth          1940        Visit Information        Provider Department      11/8/2017 10:40 AM Nico Byers MD [facility]        Today's Diagnoses     Rheumatoid arthritis involving multiple sites with positive rheumatoid factor (H)    -  1    High risk medications (not anticoagulants) long-term use           Follow-ups after your visit        Your next 10 appointments already scheduled     Feb 05, 2018 10:30 AM CST   LAB with [facility]   [facility] [place] ([facility])    [address] [address]   [phone]           Please do not eat 10-12 hours before your appointment if you are coming in fasting for labs on lipids, cholesterol, or glucose (sugar). This does not apply to pregnant women. Water, hot tea and black coffee (with nothing added) are okay. Do not drink other fluids, diet soda or chew gum.            Feb 07, 2018 11:00 AM CST   Return Visit with Nico Byers MD   [facility] [place] ([facility])    [address] [address]   [phone]              Future tests that were ordered for you today     Open Future Orders        Priority Expected Expires Ordered    Creatinine Routine 2/2/2018 2/21/2018 11/8/2017    Erythrocyte sedimentation rate auto Routine 2/2/2018 2/21/2018 11/8/2017    CRP inflammation Routine 2/2/2018 2/21/2018 11/8/2017    Hepatic panel Routine 2/2/2018 2/21/2018 11/8/2017    CBC with platelets differential Routine 2/2/2018 2/21/2018 11/8/2017            Who to contact     If you have questions or need follow up information about today's clinic visit or your schedule please contact [facility] [place] directly at [phone].  Normal or non-critical lab and imaging results will be communicated to you by MyChart, letter or phone within 4 business days after  "the clinic has received the results. If you do not hear from us within 7 days, please contact the clinic through Celframe or phone. If you have a critical or abnormal lab result, we will notify you by phone as soon as possible.  Submit refill requests through Celframe or call your pharmacy and they will forward the refill request to us. Please allow 3 business days for your refill to be completed.          Additional Information About Your Visit        Celframe Information     Celframe gives you secure access to your electronic health record. If you see a primary care provider, you can also send messages to your care team and make appointments. If you have questions, please call your primary care clinic.  If you do not have a primary care provider, please call [phone] and they will assist you.        Care EveryWhere ID     This is your Care EveryWhere ID. This could be used by other organizations to access your [place] medical records  [id number]        Your Vitals Were     Pulse Height Pulse Oximetry BMI (Body Mass Index)          117 1.67 m (5' 5.75\") 95% 29.47 kg/m2         Blood Pressure from Last 3 Encounters:   11/08/17 129/80   09/21/17 118/73   08/09/17 111/68    Weight from Last 3 Encounters:   11/08/17 82.2 kg (181 lb 3.2 oz)   09/21/17 85.3 kg (188 lb)   08/09/17 83.4 kg (183 lb 12.8 oz)                 Where to get your medicines      These medications were sent to [facility] IZAIAH Gomez - [address]  [address], [place] [address]     Phone:  [phone]     methotrexate 2.5 MG tablet          Primary Care Provider Office Phone # Fax #    Georgia Vázquez [phone] [address]  [place] [address]        Equal Access to Services     SHANIQUE WATSON : Olinda mendez Sodasia, waaxda luqadaha, qaybta kaalmarenetta quiroga, aj fernandez. Formerly [facility] [phone].    ATENCIÓN: Si samir adan, " tiene a herrera disposición servicios gratuitos de asistencia lingüística. Taina martinez [phone].    We comply with applicable federal civil rights laws and [place] laws. We do not discriminate on the basis of race, color, national origin, age, disability, sex, sexual orientation, or gender identity.            Thank you!     Thank you for choosing [facility] [place]  for your care. Our goal is always to provide you with excellent care. Hearing back from our patients is one way we can continue to improve our services. Please take a few minutes to complete the written survey that you may receive in the mail after your visit with us. Thank you!             Your Updated Medication List - Protect others around you: Learn how to safely use, store and throw away your medicines at www.disposemymeds.org.          This list is accurate as of: 11/8/17 11:17 AM.  Always use your most recent med list.                   Brand Name Dispense Instructions for use Diagnosis    ascorbic acid 1000 MG Tabs tablet      Take 1 tablet by mouth 3 times daily        aspirin 81 MG tablet      Take 1 tablet by mouth daily.        atorvastatin 40 MG tablet    LIPITOR    90 tablet    TAKE ONE TABLET BY MOUTH EVERY DAY    Hyperlipidemia LDL goal <100       azithromycin 250 MG tablet    ZITHROMAX    6 tablet    Two tablets first day, then one tablet daily for four days.    Acute pharyngitis, unspecified etiology       CALCIUM CITRATE + PO      Take 2 tablets by mouth daily        cetirizine 10 MG tablet    zyrTEC     Take 10 mg by mouth daily        cyanocobalamin 500 MCG Tabs      Take 1 tablet by mouth 2 times daily        cyclobenzaprine 10 MG tablet    FLEXERIL    60 tablet    Take 1 tablet (10 mg) by mouth 2 times daily as needed for muscle spasms    S/P lumbar fusion       ferrous gluconate 324 (38 FE) MG tablet    FERGON    270 tablet    Take 1 tablet (324 mg) by mouth 3 times daily (with meals)    Low iron       magnesium oxide 400  (241.3 MG) MG tablet    MAG-OX     Take 1 tablet (400 mg) by mouth daily        methotrexate 2.5 MG tablet     108 tablet    Take 9 tablets (22.5 mg) by mouth once a week    Rheumatoid arthritis involving multiple sites with positive rheumatoid factor (H)       Multi-vitamin Tabs tablet      Take 1 tablet by mouth daily        OMEGA-3 FISH OIL PO      Take 2,000 mg by mouth daily        order for DME      Respironics REMSTAR 60 Series Auto CPAP 12-15 cm H2O, Wisp nasal mask w/a s/m cushion        oxyCODONE IR 5 MG tablet    ROXICODONE    75 tablet    Take 1-2 tablets (5-10 mg) by mouth every 4 hours as needed for moderate to severe pain    S/P lumbar fusion       pantoprazole 40 MG EC tablet    PROTONIX    90 tablet    TAKE ONE TABLET BY MOUTH ONCE DAILY 30-60 MINUTES BEFORE A MEAL    Gastroesophageal reflux disease without esophagitis       predniSONE 20 MG tablet    DELTASONE    5 tablet    PRN Rheumatoid arthritis flare: prednisone 20mg daily x5days.    Rheumatoid arthritis involving multiple sites with positive rheumatoid factor (H)       PROBIOTIC DAILY PO           propylene glycol 0.6 % Soln ophthalmic solution    SYSTANE BALANCE     Place 1 drop into both eyes 4 times daily Reported on 5/10/2017        tofacitinib 11 MG 24 hr tablet    XELJANZ XR    90 tablet    Take 1 tablet (11 mg) by mouth daily    Rheumatoid arthritis involving multiple sites with positive rheumatoid factor (H), High risk medications (not anticoagulants) long-term use       topiramate 25 MG tablet    TOPAMAX    360 tablet    Take 2 tablets (50 mg) by mouth 2 times daily    Non morbid obesity due to excess calories       TYLENOL 500 MG tablet   Generic drug:  acetaminophen     100 tablet    Take 2 tablets (1,000 mg) by mouth every 8 hours as needed for pain        vitamin B complex with vitamin C Tabs tablet      Take 1 tablet by mouth daily.        VITAMIN D-3 PO      Take 1,000 Units by mouth 2 times daily        VITAMIN E PO      Take  2,000 Units by mouth daily        ZOLOFT 25 MG tablet   Generic drug:  sertraline      Take 25 mg by mouth daily        zolpidem 5 MG tablet    AMBIEN    90 tablet    Take 1 tablet (5 mg) by mouth nightly as needed    Psychophysiologic insomnia

## 2017-11-24 DIAGNOSIS — K21.9 GASTROESOPHAGEAL REFLUX DISEASE WITHOUT ESOPHAGITIS: ICD-10-CM

## 2017-11-27 RX ORDER — PANTOPRAZOLE SODIUM 40 MG/1
TABLET, DELAYED RELEASE ORAL
Qty: 90 TABLET | Refills: 1 | Status: SHIPPED | OUTPATIENT
Start: 2017-11-27 | End: 2018-03-06

## 2017-12-13 NOTE — PROGRESS NOTES
SUBJECTIVE:   Ginger Marshall is a 77 year old female who presents to clinic today for the following health issues:      Medication Followup of Atorvastatin     Taking Medication as prescribed: yes    Side Effects:  None    Medication Helping Symptoms:  yes     Patient notes that her weight loss has slowed.  She is hoping to get to 160 lbs.  She is walking more and increasing physical activity.  She notes her pain has resolved since she had back surgery.  Patient also notes that she has not been able to take ferrous sulfate TID as prescribed and wonders what her iron levels are.  She denies any fatigue, melena, hematochezia.      Problem list and histories reviewed & adjusted, as indicated.  Additional history: as documented    Patient Active Problem List   Diagnosis     RA (Rheumatoid Arthritis) off Plaquenil     Menopause     History of colonic polyps     Mitral Regurgitation     Multinodular goiter     CARDIOVASCULAR SCREENING; LDL GOAL LESS THAN 130     Psychophysiologic insomnia     Pulmonary nodule     PSEUDOPHAKIA OU     PVD (POSTERIOR VITREOUS DETACHMENT) OU     PXF (PSEUDOEXFOLIATION OF LENS CAPSULE) OD     GERD (gastroesophageal reflux disease)     Mild major depression (H)     Hyperlipidemia LDL goal <100     Advance Care Planning     Neuropathy     SHERRY (obstructive sleep apnea)-severe (AHI 35)     zEncounter for counseling     Anemia of chronic disease     Migraine     Osteoporosis     Cornea guttata, ou     Conjunctival concretions     Stenosis, spinal, lumbar     Other chronic pain     Obesity     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     MGD (meibomian gland dysfunction)     Blepharitis of both eyes     Personal history of healed osteoporosis fracture     Iron malabsorption     Hyperglycemia     Chronic bilateral low back pain without sciatica     Allergic state, subsequent encounter     BMI 32.0-32.9,adult     Spinal stenosis of lumbar region without neurogenic claudication     Herniated  nucleus pulposus, L3-4     S/P lumbar fusion     Anxiety     Past Surgical History:   Procedure Laterality Date     ABDOMEN SURGERY      c-sections     ARTHROSCOPY KNEE RT/LT      left     BACK SURGERY      disc     BREAST BIOPSY, RT/LT      left benign     BREAST SURGERY  's    lumpectomy     C ANESTH,TOTAL HIP ARTHROPLASTY      Rt hip     C HAND/FINGER SURGERY UNLISTED      right hand     C NONSPECIFIC PROCEDURE  89    left foot surgery     C NONSPECIFIC PROCEDURE  95    R MCP surgery     C TOTAL KNEE ARTHROPLASTY      left     C/SECTION, LOW TRANSVERSE  66,72    x 2     CATARACT IOL, RT/LT       COLONOSCOPY  ,     EYE SURGERY      cataracs     HC ESOPH/GAS REFLUX TEST W NASAL IMPED >1 HR  2012    Procedure:ESOPHAGEAL IMPEDENCE FUNCTION TEST WITH 24 HOUR PH GREATER THAN 1 HOUR; Surgeon:KAYKAY JULIO; Location:[facility]     IR CAUDAL EPIDURAL INJECTION SINGLE  2012    LESI L5-S1 at [facility]     OPTICAL TRACKING SYSTEM FUSION POSTERIOR SPINE LUMBAR N/A 4/3/2017    Procedure: OPTICAL TRACKING SYSTEM FUSION SPINE POSTERIOR LUMBAR ONE LEVEL;  Surgeon: Anthony Michele MD;  Location: [facility]     SURGICAL HISTORY OF -       right knee total replacement       Social History   Substance Use Topics     Smoking status: Former Smoker     Packs/day: 1.00     Years: 41.00     Types: Cigarettes     Quit date: 1999     Smokeless tobacco: Never Used     Alcohol use 0.0 oz/week      Comment: Periodically.     Family History   Problem Relation Age of Onset     Arthritis Mother      Alzheimer Disease Mother      Hyperlipidemia Mother      OSTEOPOROSIS Mother      HEART DISEASE Father      MI ( from this)     Alcohol/Drug Father      Arthritis Sister      Hypertension Sister      CANCER Son      DIABETES Son      Neurologic Disorder Sister      Schizophrenic     Hypertension Sister      Hyperlipidemia Sister      MENTAL ILLNESS Sister      DIABETES Son      Other Cancer Son           Current Outpatient Prescriptions   Medication Sig Dispense Refill     PREDNISONE PO Take 20 mg by mouth as needed       atorvastatin (LIPITOR) 40 MG tablet Take 1 tablet (40 mg) by mouth daily 90 tablet 3     pantoprazole (PROTONIX) 40 MG EC tablet TAKE ONE TABLET BY MOUTH EVERY DAY 30-60 MINUTES BEFORE A MEAL 90 tablet 1     methotrexate 2.5 MG tablet Take 9 tablets (22.5 mg) by mouth once a week 108 tablet 0     cyclobenzaprine (FLEXERIL) 10 MG tablet Take 1 tablet (10 mg) by mouth 2 times daily as needed for muscle spasms 60 tablet 2     topiramate (TOPAMAX) 25 MG tablet Take 2 tablets (50 mg) by mouth 2 times daily 360 tablet 3     tofacitinib (XELJANZ XR) 11 MG 24 hr tablet Take 1 tablet (11 mg) by mouth daily 90 tablet 3     cetirizine (ZYRTEC) 10 MG tablet Take 10 mg by mouth daily       order for DME Respironics REMSTAR 60 Series Auto CPAP 12-15 cm H2O, Wisp nasal mask w/a s/m cushion       VITAMIN E PO Take 2,000 Units by mouth daily        Omega-3 Fatty Acids (OMEGA-3 FISH OIL PO) Take 2,000 mg by mouth daily        multivitamin, therapeutic with minerals (MULTI-VITAMIN) TABS Take 1 tablet by mouth daily       magnesium oxide (MAG-OX) 400 (241.3 MG) MG tablet Take 1 tablet (400 mg) by mouth daily       propylene glycol (SYSTANE BALANCE) 0.6 % SOLN ophthalmic solution Place 1 drop into both eyes 4 times daily Reported on 5/10/2017       ferrous gluconate (FERGON) 324 (38 FE) MG tablet Take 1 tablet (324 mg) by mouth 3 times daily (with meals) 270 tablet 1     cyanocobalamin 500 MCG TABS Take 1 tablet by mouth 2 times daily       Calcium Citrate-Vitamin D (CALCIUM CITRATE + PO) Take 2 tablets by mouth daily       Cholecalciferol (VITAMIN D-3 PO) Take 1,000 Units by mouth 2 times daily       Ascorbic Acid 1000 MG TABS Take 1 tablet by mouth 3 times daily       acetaminophen (TYLENOL) 500 MG tablet Take 2 tablets (1,000 mg) by mouth every 8 hours as needed for pain 100 tablet 0     vitamin  B complex  "with vitamin C (VITAMIN  B COMPLEX) TABS Take 1 tablet by mouth daily.  0     aspirin 81 MG tablet Take 1 tablet by mouth daily.       [DISCONTINUED] atorvastatin (LIPITOR) 40 MG tablet TAKE ONE TABLET BY MOUTH EVERY DAY 30 tablet 0     Probiotic Product (PROBIOTIC DAILY PO)        sertraline (ZOLOFT) 25 MG tablet Take 25 mg by mouth daily        [DISCONTINUED] methotrexate 2.5 MG tablet Take 10 tablets (25 mg) by mouth once a week .  Split dose between AM and PM (5 tabs in the morning, and 5 tabs in the evening; all taken within the same day each week) 120 tablet 1     Allergies   Allergen Reactions     Abatacept      Severe headaches     Adhesive Tape      Plastic tape     Celebrex [Celecoxib]      Ineffective     Ethanol      Antihistamines     Orencia [Abatacept]      Headache     Septra [Bactrim]      Sulfa Drugs      \"deathly ill\"     Tramadol      Headache     BP Readings from Last 3 Encounters:   12/14/17 124/68   11/08/17 129/80   09/21/17 118/73    Wt Readings from Last 3 Encounters:   12/14/17 182 lb 12.8 oz (82.9 kg)   11/08/17 181 lb 3.2 oz (82.2 kg)   09/21/17 188 lb (85.3 kg)                  Labs reviewed in EPIC        Reviewed and updated as needed this visit by clinical staff     Reviewed and updated as needed this visit by Provider         ROS:  Constitutional, HEENT, cardiovascular, pulmonary, gi and gu systems are negative, except as otherwise noted.      OBJECTIVE:   /68 (Cuff Size: Adult Regular)  Pulse 90  Temp 97.9  F (36.6  C) (Oral)  Ht 5' 7.75\" (1.721 m)  Wt 182 lb 12.8 oz (82.9 kg)  SpO2 97%  BMI 28 kg/m2  Body mass index is 28 kg/(m^2).  GENERAL: healthy, alert and no distress  NECK: no adenopathy, no asymmetry, masses, or scars and thyroid nodule right lobe  RESP: lungs clear to auscultation - no rales, rhonchi or wheezes  CV: regular rate and rhythm, normal S1 S2, no S3 or S4, no murmur, click or rub, no peripheral edema and peripheral pulses strong  MS: no gross " "musculoskeletal defects noted, no edema    Diagnostic Test Results:  pending    ASSESSMENT/PLAN:     1. Hyperlipidemia LDL goal <100  Stable.  Continue current treatment plan and medications.    - atorvastatin (LIPITOR) 40 MG tablet; Take 1 tablet (40 mg) by mouth daily  Dispense: 90 tablet; Refill: 3  - Lipid panel reflex to direct LDL Fasting    2. Multinodular goiter  Patient due for repeat ultrasound (last one greater than 5 years ago).  - TSH with free T4 reflex  - US Thyroid; Future    3. Screen for colon cancer  Patient encouraged to schedule.  She says, \"I'll do it next year.\"    4. 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    - Ferritin  - Iron and iron binding capacity    5. At risk for falling        FUTURE APPOINTMENTS:       - Follow-up for annual visit or as needed    AKIRA Jones [facility]  "

## 2017-12-14 ENCOUNTER — OFFICE VISIT (OUTPATIENT)
Dept: FAMILY MEDICINE | Facility: CLINIC | Age: 77
End: 2017-12-14
Payer: COMMERCIAL

## 2017-12-14 VITALS
TEMPERATURE: 97.9 F | HEART RATE: 90 BPM | DIASTOLIC BLOOD PRESSURE: 68 MMHG | HEIGHT: 68 IN | SYSTOLIC BLOOD PRESSURE: 124 MMHG | BODY MASS INDEX: 27.71 KG/M2 | OXYGEN SATURATION: 97 % | WEIGHT: 182.8 LBS

## 2017-12-14 DIAGNOSIS — Z12.11 SCREEN FOR COLON CANCER: ICD-10-CM

## 2017-12-14 DIAGNOSIS — E04.2 MULTINODULAR GOITER: Chronic | ICD-10-CM

## 2017-12-14 DIAGNOSIS — D50.8 IRON DEFICIENCY ANEMIA REFRACTORY TO IRON THERAPY: ICD-10-CM

## 2017-12-14 DIAGNOSIS — Z91.81 AT RISK FOR FALLING: ICD-10-CM

## 2017-12-14 DIAGNOSIS — E78.5 HYPERLIPIDEMIA LDL GOAL <100: Primary | Chronic | ICD-10-CM

## 2017-12-14 LAB
CHOLEST SERPL-MCNC: 164 MG/DL
FERRITIN SERPL-MCNC: 44 NG/ML (ref 8–252)
HDLC SERPL-MCNC: 79 MG/DL
IRON SATN MFR SERPL: 22 % (ref 15–46)
IRON SERPL-MCNC: 73 UG/DL (ref 35–180)
LDLC SERPL CALC-MCNC: 61 MG/DL
NONHDLC SERPL-MCNC: 85 MG/DL
TIBC SERPL-MCNC: 332 UG/DL (ref 240–430)
TRIGL SERPL-MCNC: 121 MG/DL
TSH SERPL DL<=0.005 MIU/L-ACNC: 1.06 MU/L (ref 0.4–4)

## 2017-12-14 PROCEDURE — 83540 ASSAY OF IRON: CPT | Performed by: NURSE PRACTITIONER

## 2017-12-14 PROCEDURE — 84443 ASSAY THYROID STIM HORMONE: CPT | Performed by: NURSE PRACTITIONER

## 2017-12-14 PROCEDURE — 82728 ASSAY OF FERRITIN: CPT | Performed by: NURSE PRACTITIONER

## 2017-12-14 PROCEDURE — 99214 OFFICE O/P EST MOD 30 MIN: CPT | Performed by: NURSE PRACTITIONER

## 2017-12-14 PROCEDURE — 36415 COLL VENOUS BLD VENIPUNCTURE: CPT | Performed by: NURSE PRACTITIONER

## 2017-12-14 PROCEDURE — 80061 LIPID PANEL: CPT | Performed by: NURSE PRACTITIONER

## 2017-12-14 PROCEDURE — 83550 IRON BINDING TEST: CPT | Performed by: NURSE PRACTITIONER

## 2017-12-14 RX ORDER — ATORVASTATIN CALCIUM 40 MG/1
40 TABLET, FILM COATED ORAL DAILY
Qty: 90 TABLET | Refills: 3 | Status: SHIPPED | OUTPATIENT
Start: 2017-12-14 | End: 2018-03-06

## 2017-12-14 NOTE — PROGRESS NOTES
Dear Ginger,    Your recent test results are attached.      Normal iron.  Continue iron once daily.    If you have any questions please feel free to contact [phone] or myself via [email or web address].    Sincerely,  Dahiana Flores, CNP

## 2017-12-14 NOTE — PROGRESS NOTES
Dear Ginger,    Your recent test results are attached.      Good cholesterol.  Normal thyroid.      If you have any questions please feel free to contact [phone] or myself via [employer].    Sincerely,  Dahiana Flores, CNP

## 2017-12-14 NOTE — PATIENT INSTRUCTIONS
[facility]    If you have any questions regarding to your visit please contact your care team:     Team Pink:   Clinic Hours Telephone Number   Internal Medicine:  Dr. Georgia Flores NP       7am-7pm  Monday - Thursday   7am-5pm  Fridays  [phone]  (Appointment scheduling available 24/7)    Questions about your visit?  Team Line  [phone]   Urgent Care - [place] [facility] - 11am-9pm Monday-Friday Saturday-Sunday- 9am-5pm   [place] - 5pm-9pm Monday-Friday Saturday-Sunday- 9am-5pm  [phone] - [place]   [phone] - [place]       What options do I have for visits at the clinic other than the traditional office visit?  To expand how we care for you, many of our providers are utilizing electronic visits (e-visits) and telephone visits, when medically appropriate, for interactions with their patients rather than a visit in the clinic.   We also offer nurse visits for many medical concerns. Just like any other service, we will bill your insurance company for this type of visit based on time spent on the phone with your provider. Not all insurance companies cover these visits. Please check with your medical insurance if this type of visit is covered. You will be responsible for any charges that are not paid by your insurance.      E-visits via [employer].:  generally incur a $35.00 fee.  Telephone visits:  Time spent on the phone: *charged based on time that is spent on the phone in increments of 10 minutes. Estimated cost:   5-10 mins $30.00   11-20 mins. $59.00   21-30 mins. $85.00   Use [employer] (secure email communication and access to your chart) to send your primary care provider a message or make an appointment. Ask someone on your Team how to sign up for [employer]S.c.a.r.e..    For a Price Quote for your services, please call our Consumer Price Line at [phone].    As always, Thank you for trusting us with your health care  needs!    Discharged by Margaux CONNER CMA ([facility])

## 2017-12-14 NOTE — NURSING NOTE
"Chief Complaint   Patient presents with     Recheck Medication     due for DAP and fall risk       Initial /68 (Cuff Size: Adult Regular)  Pulse 90  Temp 97.9  F (36.6  C) (Oral)  Ht 5' 7.75\" (1.721 m)  Wt 182 lb 12.8 oz (82.9 kg)  SpO2 97%  BMI 28 kg/m2 Estimated body mass index is 28 kg/(m^2) as calculated from the following:    Height as of this encounter: 5' 7.75\" (1.721 m).    Weight as of this encounter: 182 lb 12.8 oz (82.9 kg).  Medication Reconciliation: complete   Margaux CONNER CMA ([facility])      "

## 2017-12-14 NOTE — LETTER
My Depression Action Plan  Name: Ginger Marshall   Date of Birth 1940  Date: 12/14/2017    My doctor: Georgia Vázquez   My clinic: [address]  [place] [address]  [phone]          GREEN    ZONE   Good Control    What it looks like:     Things are going generally well. You have normal up s and down s. You may even feel depressed from time to time, but bad moods usually last less than a day.   What you need to do:  1. Continue to care for yourself (see self care plan)  2. Check your depression survival kit and update it as needed  3. Follow your physician s recommendations including any medication.  4. Do not stop taking medication unless you consult with your physician first.           YELLOW         ZONE Getting Worse    What it looks like:     Depression is starting to interfere with your life.     It may be hard to get out of bed; you may be starting to isolate yourself from others.    Symptoms of depression are starting to last most all day and this has happened for several days.     You may have suicidal thoughts but they are not constant.   What you need to do:     1. Call your care team, your response to treatment will improve if you keep your care team informed of your progress. Yellow periods are signs an adjustment may need to be made.     2. Continue your self-care, even if you have to fake it!    3. Talk to someone in your support network    4. Open up your depression survival kit           RED    ZONE Medical Alert - Get Help    What it looks like:     Depression is seriously interfering with your life.     You may experience these or other symptoms: You can t get out of bed most days, can t work or engage in other necessary activities, you have trouble taking care of basic hygiene, or basic responsibilities, thoughts of suicide or death that will not go away, self-injurious behavior.     What you need to do:  1. Call your care team and  request a same-day appointment. If they are not available (weekends or after hours) call your local crisis line, emergency room or 911.      Electronically signed by: Dahiana Flores, December 14, 2017    Depression Self Care Plan / Survival Kit    Self-Care for Depression  Here s the deal. Your body and mind are really not as separate as most people think.  What you do and think affects how you feel and how you feel influences what you do and think. This means if you do things that people who feel good do, it will help you feel better.  Sometimes this is all it takes.  There is also a place for medication and therapy depending on how severe your depression is, so be sure to consult with your medical provider and/ or Behavioral Health Consultant if your symptoms are worsening or not improving.     In order to better manage my stress, I will:    Exercise  Get some form of exercise, every day. This will help reduce pain and release endorphins, the  feel good  chemicals in your brain. This is almost as good as taking antidepressants!  This is not the same as joining a gym and then never going! (they count on that by the way ) It can be as simple as just going for a walk or doing some gardening, anything that will get you moving.      Hygiene   Maintain good hygiene (Get out of bed in the morning, Make your bed, Brush your teeth, Take a shower, and Get dressed like you were going to work, even if you are unemployed).  If your clothes don't fit try to get ones that do.    Diet  I will strive to eat foods that are good for me, drink plenty of water, and avoid excessive sugar, caffeine, alcohol, and other mood-altering substances.  Some foods that are helpful in depression are: complex carbohydrates, B vitamins, flaxseed, fish or fish oil, fresh fruits and vegetables.    Psychotherapy  I agree to participate in Individual Therapy (if recommended).    Medication  If prescribed medications, I agree to take them.  Missing  doses can result in serious side effects.  I understand that drinking alcohol, or other illicit drug use, may cause potential side effects.  I will not stop my medication abruptly without first discussing it with my provider.    Staying Connected With Others  I will stay in touch with my friends, family members, and my primary care provider/team.    Use your imagination  Be creative.  We all have a creative side; it doesn t matter if it s oil painting, sand castles, or mud pies! This will also kick up the endorphins.    Witness Beauty  (AKA stop and smell the roses) Take a look outside, even in mid-winter. Notice colors, textures. Watch the squirrels and birds.     Service to others  Be of service to others.  There is always someone else in need.  By helping others we can  get out of ourselves  and remember the really important things.  This also provides opportunities for practicing all the other parts of the program.    Humor  Laugh and be silly!  Adjust your TV habits for less news and crime-drama and more comedy.    Control your stress  Try breathing deep, massage therapy, biofeedback, and meditation. Find time to relax each day.     My support system    Clinic Contact:  Phone number:    Contact 1:  Phone number:    Contact 2:  Phone number:    Anabaptism/:  Phone number:    Therapist:  Phone number:    Local crisis center:    Phone number:    Other community support:  Phone number:

## 2017-12-14 NOTE — MR AVS SNAPSHOT
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 Georgia Flores, NP       7am-7pm  Monday - Thursday   7am-5pm  Fridays  [phone]  (Appointment scheduling available 24/7)    Questions about your visit?  Team Line  [phone]   [facility] [place] [facility] - 11am-9pm Monday-Friday Saturday-Sunday- 9am-5pm   [place] - 5pm-9pm Monday-Friday Saturday-Sunday- 9am-5pm  [phone] - [place]   [phone] - [place]       What options do I have for visits at the clinic other than the traditional office visit?  To expand how we care for you, many of our providers are utilizing electronic visits (e-visits) and telephone visits, when medically appropriate, for interactions with their patients rather than a visit in the clinic.   We also offer nurse visits for many medical concerns. Just like any other service, we will bill your insurance company for this type of visit based on time spent on the phone with your provider. Not all insurance companies cover these visits. Please check with your medical insurance if this type of visit is covered. You will be responsible for any charges that are not paid by your insurance.      E-visits via [employer]:  generally incur a $35.00 fee.  Telephone visits:  Time spent on the  phone: *charged based on time that is spent on the phone in increments of 10 minutes. Estimated cost:   5-10 mins $30.00   11-20 mins. $59.00   21-30 mins. $85.00   Use [employer] (secure email communication and access to your chart) to send your primary care provider a message or make an appointment. Ask someone on your Team how to sign up for [employer].    For a Price Quote for your services, please call our Consumer Price Line at [phone].    As always, Thank you for trusting us with your health care needs!    Discharged by Margaux CONNER CMA ([facility])            Follow-ups after your visit        Your next 10 appointments already scheduled     Feb 05, 2018 10:30 AM CST   LAB with  LAB   [facility] [place] ([facility])    [address]  [place] [address]   [phone]           Please do not eat 10-12 hours before your appointment if you are coming in fasting for labs on lipids, cholesterol, or glucose (sugar). This does not apply to pregnant women. Water, hot tea and black coffee (with nothing added) are okay. Do not drink other fluids, diet soda or chew gum.            Feb 07, 2018 11:00 AM CST   Return Visit with Nico Byers MD   [facility] [place] ([facility])    [address] [place] [address]   [phone]              Future tests that were ordered for you today     Open Future Orders        Priority Expected Expires Ordered    US Thyroid Routine  12/14/2018 12/14/2017            Who to contact     If you have questions or need follow up information about today's clinic visit or your schedule please contact [facility] [place] directly at [phone].  Normal or non-critical lab and imaging results will be communicated to you by MyChart, letter or phone within 4 business days after the clinic has received the results. If you do not hear from us within 7 days, please contact the clinic through MyChart or phone. If you have a critical or  "abnormal lab result, we will notify you by phone as soon as possible.  Submit refill requests through [employer] or call your pharmacy and they will forward the refill request to us. Please allow 3 business days for your refill to be completed.          Additional Information About Your Visit        [employer] Information     [employer] gives you secure access to your electronic health record. If you see a primary care provider, you can also send messages to your care team and make appointments. If you have questions, please call your primary care clinic.  If you do not have a primary care provider, please call [phone] and they will assist you.        Care EveryWhere ID     This is your Care EveryWhere ID. This could be used by other organizations to access your [facility] medical records  [id number]        Your Vitals Were     Pulse Temperature Height Pulse Oximetry BMI (Body Mass Index)       90 97.9  F (36.6  C) (Oral) 5' 7.75\" (1.721 m) 97% 28 kg/m2        Blood Pressure from Last 3 Encounters:   12/14/17 124/68   11/08/17 129/80   09/21/17 118/73    Weight from Last 3 Encounters:   12/14/17 182 lb 12.8 oz (82.9 kg)   11/08/17 181 lb 3.2 oz (82.2 kg)   09/21/17 188 lb (85.3 kg)              We Performed the Following     Ferritin     Iron and iron binding capacity     Lipid panel reflex to direct LDL Fasting     TSH with free T4 reflex          Today's Medication Changes          These changes are accurate as of: 12/14/17  9:51 AM.  If you have any questions, ask your nurse or doctor.               These medicines have changed or have updated prescriptions.        Dose/Directions    atorvastatin 40 MG tablet   Commonly known as:  LIPITOR   This may have changed:  See the new instructions.   Used for:  Hyperlipidemia LDL goal <100   Changed by:  Dahiana Flores APRN CNP        Dose:  40 mg   Take 1 tablet (40 mg) by mouth daily   Quantity:  90 tablet   Refills:  3            Where to get your medicines    "   These medications were sent to [facility] [place], [place] - [address]  [address], [place] [address]     Phone:  [phone]     atorvastatin 40 MG tablet                Primary Care Provider Office Phone # Fax #    Georgia Vázquez [phone] [address]  [place] [address]        Equal Access to Services     [facility] Hospital[facility] : Hadii aad ku hadasho Soomaali, waaxda luqadaha, qaybta kaalmada adeegyada, waxay idiin hayaan adeeg kharash la'aan . So [facility] [phone].    ATENCIÓN: Si habla español, tiene a herrera disposición servicios gratuitos de asistencia lingüística. [facility] [phone].    We comply with applicable federal civil rights laws and [place] laws. We do not discriminate on the basis of race, color, national origin, age, disability, sex, sexual orientation, or gender identity.            Thank you!     Thank you for choosing [facility]  for your care. Our goal is always to provide you with excellent care. Hearing back from our patients is one way we can continue to improve our services. Please take a few minutes to complete the written survey that you may receive in the mail after your visit with us. Thank you!             Your Updated Medication List - Protect others around you: Learn how to safely use, store and throw away your medicines at www.disposemymeds.org.          This list is accurate as of: 12/14/17  9:51 AM.  Always use your most recent med list.                   Brand Name Dispense Instructions for use Diagnosis    ascorbic acid 1000 MG Tabs tablet      Take 1 tablet by mouth 3 times daily        aspirin 81 MG tablet      Take 1 tablet by mouth daily.        atorvastatin 40 MG tablet    LIPITOR    90 tablet    Take 1 tablet (40 mg) by mouth daily    Hyperlipidemia LDL goal <100       CALCIUM CITRATE + PO      Take 2 tablets by mouth daily        cetirizine 10 MG tablet    zyrTEC     Take 10 mg by  mouth daily        cyanocobalamin 500 MCG Tabs      Take 1 tablet by mouth 2 times daily        cyclobenzaprine 10 MG tablet    FLEXERIL    60 tablet    Take 1 tablet (10 mg) by mouth 2 times daily as needed for muscle spasms    S/P lumbar fusion       ferrous gluconate 324 (38 FE) MG tablet    FERGON    270 tablet    Take 1 tablet (324 mg) by mouth 3 times daily (with meals)    Low iron       magnesium oxide 400 (241.3 MG) MG tablet    MAG-OX     Take 1 tablet (400 mg) by mouth daily        methotrexate 2.5 MG tablet     108 tablet    Take 9 tablets (22.5 mg) by mouth once a week    Rheumatoid arthritis involving multiple sites with positive rheumatoid factor (H)       Multi-vitamin Tabs tablet      Take 1 tablet by mouth daily        OMEGA-3 FISH OIL PO      Take 2,000 mg by mouth daily        [place] [facility]      Respironics REMSTAR 60 Series Auto CPAP 12-15 cm H2O, Wisp nasal mask w/a s/m cushion        pantoprazole 40 MG EC tablet    PROTONIX    90 tablet    TAKE ONE TABLET BY MOUTH EVERY DAY 30-60 MINUTES BEFORE A MEAL    Gastroesophageal reflux disease without esophagitis       PREDNISONE PO      Take 20 mg by mouth as needed        PROBIOTIC DAILY PO           propylene glycol 0.6 % Soln ophthalmic solution    SYSTANE BALANCE     Place 1 drop into both eyes 4 times daily Reported on 5/10/2017        tofacitinib 11 MG 24 hr tablet    XELJANZ XR    90 tablet    Take 1 tablet (11 mg) by mouth daily    Rheumatoid arthritis involving multiple sites with positive rheumatoid factor (H), High risk medications (not anticoagulants) long-term use       topiramate 25 MG tablet    TOPAMAX    360 tablet    Take 2 tablets (50 mg) by mouth 2 times daily    Non morbid obesity due to excess calories       TYLENOL 500 MG tablet   Generic drug:  acetaminophen     100 tablet    Take 2 tablets (1,000 mg) by mouth every 8 hours as needed for pain        vitamin B complex with vitamin C Tabs tablet      Take 1 tablet by mouth daily.         VITAMIN D-3 PO      Take 1,000 Units by mouth 2 times daily        VITAMIN E PO      Take 2,000 Units by mouth daily        ZOLOFT 25 MG tablet   Generic drug:  sertraline      Take 25 mg by mouth daily

## 2017-12-20 ENCOUNTER — TELEPHONE (OUTPATIENT)
Dept: OPHTHALMOLOGY | Facility: CLINIC | Age: 77
End: 2017-12-20

## 2017-12-20 NOTE — TELEPHONE ENCOUNTER
"Received a faxed request from [facility] to complete a Non-Prescription Drug Expense Verification form regarding Systane eye drops.  Verified that systane is \"recommended\", \"daily\", and \"to treat a condition\".  Form will be signed by Dr. San this afternoon, and returned by fax.  "

## 2018-01-04 ENCOUNTER — TELEPHONE (OUTPATIENT)
Dept: ENDOCRINOLOGY | Facility: CLINIC | Age: 78
End: 2018-01-04

## 2018-01-04 NOTE — TELEPHONE ENCOUNTER
Patient had  DEXA orders. Patient states that she forgot and declined to schedule at this time due to cold weather. Orders extended for 1 year and patient states that she will schedule with [place] when weather is better.  Delilah Wheeler LPN

## 2018-01-11 ENCOUNTER — RADIANT APPOINTMENT (OUTPATIENT)
Dept: ULTRASOUND IMAGING | Facility: CLINIC | Age: 78
End: 2018-01-11
Attending: NURSE PRACTITIONER
Payer: COMMERCIAL

## 2018-01-11 DIAGNOSIS — E04.2 MULTINODULAR GOITER: Chronic | ICD-10-CM

## 2018-01-11 PROCEDURE — 76536 US EXAM OF HEAD AND NECK: CPT

## 2018-01-12 DIAGNOSIS — E04.1 THYROID NODULE: Primary | ICD-10-CM

## 2018-01-12 NOTE — PROGRESS NOTES
Please call patient-    Her thyroid continues to show multiple nodules.  She has one that has grown to her left lobe that I think should be biopsied to make sure it is benign.  Please place referral to ENT for biopsy and notify patient.    Thanks,  Dahiana Flores, CNP

## 2018-01-17 ENCOUNTER — OFFICE VISIT (OUTPATIENT)
Dept: OTOLARYNGOLOGY | Facility: CLINIC | Age: 78
End: 2018-01-17
Payer: COMMERCIAL

## 2018-01-17 VITALS — HEIGHT: 68 IN | WEIGHT: 184.2 LBS | TEMPERATURE: 96.9 F | BODY MASS INDEX: 27.92 KG/M2

## 2018-01-17 DIAGNOSIS — E04.1 THYROID NODULE: Primary | ICD-10-CM

## 2018-01-17 PROCEDURE — 99203 OFFICE O/P NEW LOW 30 MIN: CPT | Performed by: OTOLARYNGOLOGY

## 2018-01-17 ASSESSMENT — PAIN SCALES - GENERAL: PAINLEVEL: NO PAIN (0)

## 2018-01-17 NOTE — PROGRESS NOTES
Chief Complaint - thyroid nodule    History of Present Illness - Ginger Marshall is a 77 year old female who was found to have a thyroid mass on U/S evaluation. She has some globus sensation for years. An U/S in 2010 showed multiple nodules. Most recent U/S showed growth of one of the left thyroid nodules (2.8x1.7x1.7). They deny symptoms of hoarseness, dysphagia, odynaphagia, or hemoptysis. In addition, thyroid function tests showed a TSH of 1.06. The patient notes no family history of thyroid disorders or thyroid cancer. The patient denies any history of neck radiation exposure.     Past Medical History -   Patient Active Problem List   Diagnosis     RA (Rheumatoid Arthritis) off Plaquenil     Menopause     History of colonic polyps     Mitral Regurgitation     Multinodular goiter     CARDIOVASCULAR SCREENING; LDL GOAL LESS THAN 130     Psychophysiologic insomnia     Pulmonary nodule     PSEUDOPHAKIA OU     PVD (POSTERIOR VITREOUS DETACHMENT) OU     PXF (PSEUDOEXFOLIATION OF LENS CAPSULE) OD     GERD (gastroesophageal reflux disease)     Mild major depression (H)     Hyperlipidemia LDL goal <100     Advance Care Planning     Neuropathy     SHERRY (obstructive sleep apnea)-severe (AHI 35)     zEncounter for counseling     Anemia of chronic disease     Migraine     Osteoporosis     Cornea guttata, ou     Conjunctival concretions     Stenosis, spinal, lumbar     Other chronic pain     Obesity     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     MGD (meibomian gland dysfunction)     Blepharitis of both eyes     Personal history of healed osteoporosis fracture     Iron malabsorption     Hyperglycemia     Chronic bilateral low back pain without sciatica     Allergic state, subsequent encounter     BMI 32.0-32.9,adult     Spinal stenosis of lumbar region without neurogenic claudication     Herniated nucleus pulposus, L3-4     S/P lumbar fusion     Anxiety       Current Medications -   Current Outpatient Prescriptions:       PREDNISONE PO, Take 20 mg by mouth as needed, Disp: , Rfl:      atorvastatin (LIPITOR) 40 MG tablet, Take 1 tablet (40 mg) by mouth daily, Disp: 90 tablet, Rfl: 3     pantoprazole (PROTONIX) 40 MG EC tablet, TAKE ONE TABLET BY MOUTH EVERY DAY 30-60 MINUTES BEFORE A MEAL, Disp: 90 tablet, Rfl: 1     Probiotic Product (PROBIOTIC DAILY PO), , Disp: , Rfl:      methotrexate 2.5 MG tablet, Take 9 tablets (22.5 mg) by mouth once a week, Disp: 108 tablet, Rfl: 0     cyclobenzaprine (FLEXERIL) 10 MG tablet, Take 1 tablet (10 mg) by mouth 2 times daily as needed for muscle spasms, Disp: 60 tablet, Rfl: 2     topiramate (TOPAMAX) 25 MG tablet, Take 2 tablets (50 mg) by mouth 2 times daily, Disp: 360 tablet, Rfl: 3     tofacitinib (XELJANZ XR) 11 MG 24 hr tablet, Take 1 tablet (11 mg) by mouth daily, Disp: 90 tablet, Rfl: 3     cetirizine (ZYRTEC) 10 MG tablet, Take 10 mg by mouth daily, Disp: , Rfl:      sertraline (ZOLOFT) 25 MG tablet, Take 25 mg by mouth daily , Disp: , Rfl:      order for DME, Respironics REMSTAR 60 Series Auto CPAP 12-15 cm H2O, Wisp nasal mask w/a s/m cushion, Disp: , Rfl:      VITAMIN E PO, Take 2,000 Units by mouth daily , Disp: , Rfl:      Omega-3 Fatty Acids (OMEGA-3 FISH OIL PO), Take 2,000 mg by mouth daily , Disp: , Rfl:      multivitamin, therapeutic with minerals (MULTI-VITAMIN) TABS, Take 1 tablet by mouth daily, Disp: , Rfl:      magnesium oxide (MAG-OX) 400 (241.3 MG) MG tablet, Take 1 tablet (400 mg) by mouth daily, Disp: , Rfl:      propylene glycol (SYSTANE BALANCE) 0.6 % SOLN ophthalmic solution, Place 1 drop into both eyes 4 times daily Reported on 5/10/2017, Disp: , Rfl:      ferrous gluconate (FERGON) 324 (38 FE) MG tablet, Take 1 tablet (324 mg) by mouth 3 times daily (with meals), Disp: 270 tablet, Rfl: 1     cyanocobalamin 500 MCG TABS, Take 1 tablet by mouth 2 times daily, Disp: , Rfl:      Calcium Citrate-Vitamin D (CALCIUM CITRATE + PO), Take 2 tablets by mouth daily, Disp:  ", Rfl:      Cholecalciferol (VITAMIN D-3 PO), Take 1,000 Units by mouth 2 times daily, Disp: , Rfl:      Ascorbic Acid 1000 MG TABS, Take 1 tablet by mouth 3 times daily, Disp: , Rfl:      acetaminophen (TYLENOL) 500 MG tablet, Take 2 tablets (1,000 mg) by mouth every 8 hours as needed for pain, Disp: 100 tablet, Rfl: 0     vitamin  B complex with vitamin C (VITAMIN  B COMPLEX) TABS, Take 1 tablet by mouth daily., Disp: , Rfl: 0     aspirin 81 MG tablet, Take 1 tablet by mouth daily., Disp: , Rfl:      [DISCONTINUED] methotrexate 2.5 MG tablet, Take 10 tablets (25 mg) by mouth once a week .  Split dose between AM and PM (5 tabs in the morning, and 5 tabs in the evening; all taken within the same day each week), Disp: 120 tablet, Rfl: 1    Allergies -   Allergies   Allergen Reactions     Abatacept      Severe headaches     Adhesive Tape      Plastic tape     Celebrex [Celecoxib]      Ineffective     Ethanol      Antihistamines     Orencia [Abatacept]      Headache     Septra [Bactrim]      Sulfa Drugs      \"deathly ill\"     Tramadol      Headache       Social History -   Social History     Social History     Marital status: Single     Spouse name: N/A     Number of children: 3     Years of education: N/A     Occupational History     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     Drug use: No     Sexual activity: No     Other Topics Concern     Parent/Sibling W/ Cabg, Mi Or Angioplasty Before 65f 55m? No     Caffeine Concern Yes     She has 8 cups of coffee in the AM and is done by 8-8:30 AM.     Exercise Yes     Sometimes.  [employer] comes 3 times a week to her building.     Social History Narrative    She lives in a a senior living apartment building.       Family History -   Family " "History   Problem Relation Age of Onset     Arthritis Mother      Alzheimer Disease Mother      Hyperlipidemia Mother      OSTEOPOROSIS Mother      HEART DISEASE Father      MI ( from this)     Alcohol/Drug Father      Arthritis Sister      Hypertension Sister      CANCER Son      DIABETES Son      Neurologic Disorder Sister      Schizophrenic     Hypertension Sister      Hyperlipidemia Sister      MENTAL ILLNESS Sister      DIABETES Son      Other Cancer Son        Review of Systems - As per HPI and PMHx, otherwise 7 system review of the head and neck negative.    Physical Exam  Temp 96.9  F (36.1  C) (Oral)  Ht 1.721 m (5' 7.75\")  Wt 83.6 kg (184 lb 3.2 oz)  BMI 28.21 kg/m2  General - The patient is in no distress. Alert and oriented x3, answers questions and cooperates with examination appropriately.   Voice and Breathing - The patient was breathing comfortably without the use of accessory muscles. There was no wheezing, stridor, or stertor.  The patients voice was clear and strong.  Head and Face - Normocephalic and atraumatic.    Eyes - Extraocular movements intact. Sclera were not icteric or injected, conjunctiva were pink and moist.  Neurologic - Cranial nerves II-XII are grossly intact. Specifically, the facial nerve is intact, House-Brackmann grade 1 of 6.   Mouth - Examination of the oral cavity showed pink, healthy oral mucosa. No lesions or ulcerations noted. The tongue was mobile and protrudes midline.  Oropharynx - The walls of the oropharynx were smooth, symmetric, and had no lesions or ulcerations. The uvula was midline and the palate raised symmetrically.   Neck -  Palpation of the occipital, submental, submandibular, internal jugular chain, and supraclavicular nodes did not demonstrate any abnormal lymph nodes or masses. The parotid glands were without masses. Palpation of the thyroid revealed a firm left lobe nodule, 2.5-3 cm. There was no significant pain with palpation. The trachea was " midline.      A/P - Ginger Marshall is a 77 year old female with multiple thyroid nodules. One has enlarged in 8 years, but only about 1/2 cm. It was never biopsied and is over 2 cm. I have explained the importance of a ultrasound-guided FNA of the nodule to rule-out malignancy. However, given it hasn't changed much in 8 years it is likely benign. She wanted to repeat U/S in 6 months instead of biopsy. If it grows, she has pain, troubles swallowing, hoarseness, etc, she should return.       Ry Espinosa MD  Otolaryngology  [facility]

## 2018-01-17 NOTE — MR AVS SNAPSHOT
After Visit Summary   1/17/2018    Ginger Marshall    MRN: [id number]           Patient Information     Date Of Birth          1940        Visit Information        Provider Department      1/17/2018 11:00 AM Ry Espinosa MD [facility]        Today's Diagnoses     Thyroid nodule    -  1      Care Instructions    General Scheduling Information  To schedule your CT/MRI scan, please contact [place] [facility] at [phone] [address] [place] [address]    To schedule your Surgery, please contact our Specialty Schedulers at [phone]    ENT Clinic Locations Clinic Hours Telephone Number     [facility]  [address]  [place] [address]   Tuesday:       8:00am -- 4:00pm    Wednesday:  8:00am - 4:00pm   To schedule an appointment with   Dr. Espinosa,   please contact our   Specialty Scheduling Department at:     [phone]       [facility]  [address]   [place] [address]   Friday:          8:00am - 4:00pm         Urgent Care Locations Clinic Hours Telephone Numbers     [facility]  [address] [place] [address]     Monday-Friday:     11:00pm - 9:00pm    Saturday-Sunday:  9:00am - 5:00pm   [phone]     [facility]  [address]   [place] [address]     Monday-Friday:      5:00pm - 9:00pm     Saturday-Sunday:  9:00am - 5:00pm   [phone]                 Follow-ups after your visit        Your next 10 appointments already scheduled     Feb 05, 2018 10:30 AM CST   [facility]   [facility]    [address] [id number] [phone]           Please do not eat 10-12 hours before your appointment if you are coming in fasting for labs on lipids, cholesterol, or glucose (sugar). This does not apply to pregnant women. Water, hot tea and black coffee (with nothing added) are okay. Do not drink other fluids, diet soda or chew gum.            Feb  "07, 2018 11:00 AM CST   Return Visit with Nico Byers MD   [facility]    [address]  [place] [address] [phone]              Future tests that were ordered for you today     Open Future Orders        Priority Expected Expires Ordered    US Thyroid Routine 7/2/2018 1/17/2019 1/17/2018            Who to contact     If you have questions or need follow up information about today's clinic visit or your schedule please contact [facility] directly at [phone].  Normal or non-critical lab and imaging results will be communicated to you by POS on CLOUDhart, letter or phone within 4 business days after the clinic has received the results. If you do not hear from us within 7 days, please contact the clinic through POS on CLOUDhart or phone. If you have a critical or abnormal lab result, we will notify you by phone as soon as possible.  Submit refill requests through [employer] Playbook or call your pharmacy and they will forward the refill request to us. Please allow 3 business days for your refill to be completed.          Additional Information About Your Visit        MyChart Information     [employer]book gives you secure access to your electronic health record. If you see a primary care provider, you can also send messages to your care team and make appointments. If you have questions, please call your primary care clinic.  If you do not have a primary care provider, please call [phone] and they will assist you.        Care EveryWhere ID     This is your Care EveryWhere ID. This could be used by other organizations to access your [place] medical records  [id number]        Your Vitals Were     Temperature Height BMI (Body Mass Index)             96.9  F (36.1  C) (Oral) 1.721 m (5' 7.75\") 28.21 kg/m2          Blood Pressure from Last 3 Encounters:   12/14/17 124/68   11/08/17 129/80   09/21/17 118/73    Weight from Last 3 Encounters:   01/17/18 83.6 kg (184 lb 3.2 oz) "   12/14/17 82.9 kg (182 lb 12.8 oz)   11/08/17 82.2 kg (181 lb 3.2 oz)               Primary Care Provider Office Phone # Fax #    Georgia Vázquez [phone]       [address]  [facility] [address]        Equal Access to Services     [facility]: Hadii aad ku hadasho Soomaali, waaxda luqadaha, qaybta kaalmada adeegyada, waxay idiin hayaan adeeg khjuliannesh la'aan . So [facility] [phone].    ATENCIÓN: Si habla español, tiene a herrera disposición servicios gratuitos de asistencia lingüística. Ayoame al [phone].    We comply with applicable federal civil rights laws and [place] laws. We do not discriminate on the basis of race, color, national origin, age, disability, sex, sexual orientation, or gender identity.            Thank you!     Thank you for choosing [facility]  for your care. Our goal is always to provide you with excellent care. Hearing back from our patients is one way we can continue to improve our services. Please take a few minutes to complete the written survey that you may receive in the mail after your visit with us. Thank you!             Your Updated Medication List - Protect others around you: Learn how to safely use, store and throw away your medicines at www.disposemymeds.org.          This list is accurate as of: 1/17/18  5:35 PM.  Always use your most recent med list.                   Brand Name Dispense Instructions for use Diagnosis    ascorbic acid 1000 MG Tabs tablet      Take 1 tablet by mouth 3 times daily        aspirin 81 MG tablet      Take 1 tablet by mouth daily.        atorvastatin 40 MG tablet    LIPITOR    90 tablet    Take 1 tablet (40 mg) by mouth daily    Hyperlipidemia LDL goal <100       CALCIUM CITRATE + PO      Take 2 tablets by mouth daily        cetirizine 10 MG tablet    zyrTEC     Take 10 mg by mouth daily        cyanocobalamin 500 MCG Tabs      Take 1 tablet by mouth 2 times daily        cyclobenzaprine 10 MG tablet    FLEXERIL     60 tablet    Take 1 tablet (10 mg) by mouth 2 times daily as needed for muscle spasms    S/P lumbar fusion       ferrous gluconate 324 (38 FE) MG tablet    FERGON    270 tablet    Take 1 tablet (324 mg) by mouth 3 times daily (with meals)    Low iron       magnesium oxide 400 (241.3 MG) MG tablet    MAG-OX     Take 1 tablet (400 mg) by mouth daily        methotrexate 2.5 MG tablet     108 tablet    Take 9 tablets (22.5 mg) by mouth once a week    Rheumatoid arthritis involving multiple sites with positive rheumatoid factor (H)       Multi-vitamin Tabs tablet      Take 1 tablet by mouth daily        OMEGA-3 FISH OIL PO      Take 2,000 mg by mouth daily        order for [facility]      Respironics REMSTAR 60 Series Auto CPAP 12-15 cm H2O, Wisp nasal mask w/a s/m cushion        pantoprazole 40 MG EC tablet    PROTONIX    90 tablet    TAKE ONE TABLET BY MOUTH EVERY DAY 30-60 MINUTES BEFORE A MEAL    Gastroesophageal reflux disease without esophagitis       PREDNISONE PO      Take 20 mg by mouth as needed        PROBIOTIC DAILY PO           propylene glycol 0.6 % Soln ophthalmic solution    SYSTANE BALANCE     Place 1 drop into both eyes 4 times daily Reported on 5/10/2017        tofacitinib 11 MG 24 hr tablet    XELJANZ XR    90 tablet    Take 1 tablet (11 mg) by mouth daily    Rheumatoid arthritis involving multiple sites with positive rheumatoid factor (H), High risk medications (not anticoagulants) long-term use       topiramate 25 MG tablet    TOPAMAX    360 tablet    Take 2 tablets (50 mg) by mouth 2 times daily    Non morbid obesity due to excess calories       TYLENOL 500 MG tablet   Generic drug:  acetaminophen     100 tablet    Take 2 tablets (1,000 mg) by mouth every 8 hours as needed for pain        vitamin B complex with vitamin C Tabs tablet      Take 1 tablet by mouth daily.        VITAMIN D-3 PO      Take 1,000 Units by mouth 2 times daily        VITAMIN E PO      Take 2,000 Units by mouth daily        ZOLOFT  25 MG tablet   Generic drug:  sertraline      Take 25 mg by mouth daily

## 2018-01-17 NOTE — LETTER
1/17/2018         RE: Ginger Marshall  [address]   [facility] [address]        Dear Colleague,    Thank you for referring your patient, Ginger Marshall, to the [facility]. Please see a copy of my visit note below.    Chief Complaint - thyroid nodule    History of Present Illness - Ginger Marshall is a 77 year old female who was found to have a thyroid mass on U/S evaluation. She has some globus sensation for years. An U/S in 2010 showed multiple nodules. Most recent U/S showed growth of one of the left thyroid nodules (2.8x1.7x1.7). They deny symptoms of hoarseness, dysphagia, odynaphagia, or hemoptysis. In addition, thyroid function tests showed a TSH of 1.06. The patient notes no family history of thyroid disorders or thyroid cancer. The patient denies any history of neck radiation exposure.     Past Medical History -   Patient Active Problem List   Diagnosis     RA (Rheumatoid Arthritis) off Plaquenil     Menopause     History of colonic polyps     Mitral Regurgitation     Multinodular goiter     CARDIOVASCULAR SCREENING; LDL GOAL LESS THAN 130     Psychophysiologic insomnia     Pulmonary nodule     PSEUDOPHAKIA OU     PVD (POSTERIOR VITREOUS DETACHMENT) OU     PXF (PSEUDOEXFOLIATION OF LENS CAPSULE) OD     GERD (gastroesophageal reflux disease)     Mild major depression (H)     Hyperlipidemia LDL goal <100     Advance Care Planning     Neuropathy     SHERRY (obstructive sleep apnea)-severe (AHI 35)     zEncounter for counseling     Anemia of chronic disease     Migraine     Osteoporosis     Cornea guttata, ou     Conjunctival concretions     Stenosis, spinal, lumbar     Other chronic pain     Obesity     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     MGD (meibomian gland dysfunction)     Blepharitis of both eyes     Personal history of healed osteoporosis fracture     Iron malabsorption     Hyperglycemia     Chronic bilateral low back pain without sciatica     Allergic  state, subsequent encounter     BMI 32.0-32.9,adult     Spinal stenosis of lumbar region without neurogenic claudication     Herniated nucleus pulposus, L3-4     S/P lumbar fusion     Anxiety       Current Medications -   Current Outpatient Prescriptions:      PREDNISONE PO, Take 20 mg by mouth as needed, Disp: , Rfl:      atorvastatin (LIPITOR) 40 MG tablet, Take 1 tablet (40 mg) by mouth daily, Disp: 90 tablet, Rfl: 3     pantoprazole (PROTONIX) 40 MG EC tablet, TAKE ONE TABLET BY MOUTH EVERY DAY 30-60 MINUTES BEFORE A MEAL, Disp: 90 tablet, Rfl: 1     Probiotic Product (PROBIOTIC DAILY PO), , Disp: , Rfl:      methotrexate 2.5 MG tablet, Take 9 tablets (22.5 mg) by mouth once a week, Disp: 108 tablet, Rfl: 0     cyclobenzaprine (FLEXERIL) 10 MG tablet, Take 1 tablet (10 mg) by mouth 2 times daily as needed for muscle spasms, Disp: 60 tablet, Rfl: 2     topiramate (TOPAMAX) 25 MG tablet, Take 2 tablets (50 mg) by mouth 2 times daily, Disp: 360 tablet, Rfl: 3     tofacitinib (XELJANZ XR) 11 MG 24 hr tablet, Take 1 tablet (11 mg) by mouth daily, Disp: 90 tablet, Rfl: 3     cetirizine (ZYRTEC) 10 MG tablet, Take 10 mg by mouth daily, Disp: , Rfl:      sertraline (ZOLOFT) 25 MG tablet, Take 25 mg by mouth daily , Disp: , Rfl:      order for DME, Respironics REMSTAR 60 Series Auto CPAP 12-15 cm H2O, Wisp nasal mask w/a s/m cushion, Disp: , Rfl:      VITAMIN E PO, Take 2,000 Units by mouth daily , Disp: , Rfl:      Omega-3 Fatty Acids (OMEGA-3 FISH OIL PO), Take 2,000 mg by mouth daily , Disp: , Rfl:      multivitamin, therapeutic with minerals (MULTI-VITAMIN) TABS, Take 1 tablet by mouth daily, Disp: , Rfl:      magnesium oxide (MAG-OX) 400 (241.3 MG) MG tablet, Take 1 tablet (400 mg) by mouth daily, Disp: , Rfl:      propylene glycol (SYSTANE BALANCE) 0.6 % SOLN ophthalmic solution, Place 1 drop into both eyes 4 times daily Reported on 5/10/2017, Disp: , Rfl:      ferrous gluconate (FERGON) 324 (38 FE) MG tablet, Take  "1 tablet (324 mg) by mouth 3 times daily (with meals), Disp: 270 tablet, Rfl: 1     cyanocobalamin 500 MCG TABS, Take 1 tablet by mouth 2 times daily, Disp: , Rfl:      Calcium Citrate-Vitamin D (CALCIUM CITRATE + PO), Take 2 tablets by mouth daily, Disp: , Rfl:      Cholecalciferol (VITAMIN D-3 PO), Take 1,000 Units by mouth 2 times daily, Disp: , Rfl:      Ascorbic Acid 1000 MG TABS, Take 1 tablet by mouth 3 times daily, Disp: , Rfl:      acetaminophen (TYLENOL) 500 MG tablet, Take 2 tablets (1,000 mg) by mouth every 8 hours as needed for pain, Disp: 100 tablet, Rfl: 0     vitamin  B complex with vitamin C (VITAMIN  B COMPLEX) TABS, Take 1 tablet by mouth daily., Disp: , Rfl: 0     aspirin 81 MG tablet, Take 1 tablet by mouth daily., Disp: , Rfl:      [DISCONTINUED] methotrexate 2.5 MG tablet, Take 10 tablets (25 mg) by mouth once a week .  Split dose between AM and PM (5 tabs in the morning, and 5 tabs in the evening; all taken within the same day each week), Disp: 120 tablet, Rfl: 1    Allergies -   Allergies   Allergen Reactions     Abatacept      Severe headaches     Adhesive Tape      Plastic tape     Celebrex [Celecoxib]      Ineffective     Ethanol      Antihistamines     Orencia [Abatacept]      Headache     Septra [Bactrim]      Sulfa Drugs      \"deathly ill\"     Tramadol      Headache       Social History -   Social History     Social History     Marital status: Single     Spouse name: N/A     Number of children: 3     Years of education: N/A     Occupational History     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     Drug use: No     Sexual activity: No     Other Topics Concern     Parent/Sibling W/ Cabg, Mi Or Angioplasty Before 65f 55m? No     Caffeine Concern Yes     She has 8 " "cups of coffee in the AM and is done by 8-8:30 AM.     Exercise Yes     Sometimes.  [employer] comes 3 times a week to her building.     Social History Narrative    She lives in a a senior living apartment building.       Family History -   Family History   Problem Relation Age of Onset     Arthritis Mother      Alzheimer Disease Mother      Hyperlipidemia Mother      OSTEOPOROSIS Mother      HEART DISEASE Father      MI ( from this)     Alcohol/Drug Father      Arthritis Sister      Hypertension Sister      CANCER Son      DIABETES Son      Neurologic Disorder Sister      Schizophrenic     Hypertension Sister      Hyperlipidemia Sister      MENTAL ILLNESS Sister      DIABETES Son      Other Cancer Son        Review of Systems - As per HPI and PMHx, otherwise 7 system review of the head and neck negative.    Physical Exam  Temp 96.9  F (36.1  C) (Oral)  Ht 1.721 m (5' 7.75\")  Wt 83.6 kg (184 lb 3.2 oz)  BMI 28.21 kg/m2  General - The patient is in no distress. Alert and oriented x3, answers questions and cooperates with examination appropriately.   Voice and Breathing - The patient was breathing comfortably without the use of accessory muscles. There was no wheezing, stridor, or stertor.  The patients voice was clear and strong.  Head and Face - Normocephalic and atraumatic.    Eyes - Extraocular movements intact. Sclera were not icteric or injected, conjunctiva were pink and moist.  Neurologic - Cranial nerves II-XII are grossly intact. Specifically, the facial nerve is intact, House-Brackmann grade 1 of 6.   Mouth - Examination of the oral cavity showed pink, healthy oral mucosa. No lesions or ulcerations noted. The tongue was mobile and protrudes midline.  Oropharynx - The walls of the oropharynx were smooth, symmetric, and had no lesions or ulcerations. The uvula was midline and the palate raised symmetrically.   Neck -  Palpation of the occipital, submental, submandibular, internal jugular chain, " and supraclavicular nodes did not demonstrate any abnormal lymph nodes or masses. The parotid glands were without masses. Palpation of the thyroid revealed a firm left lobe nodule, 2.5-3 cm. There was no significant pain with palpation. The trachea was midline.      A/P - Ginger Marshall is a 77 year old female with multiple thyroid nodules. One has enlarged in 8 years, but only about 1/2 cm. It was never biopsied and is over 2 cm. I have explained the importance of a ultrasound-guided FNA of the nodule to rule-out malignancy. However, given it hasn't changed much in 8 years it is likely benign. She wanted to repeat U/S in 6 months instead of biopsy. If it grows, she has pain, troubles swallowing, hoarseness, etc, she should return.       Ry Espinosa MD  Otolaryngology  [facility]      Again, thank you for allowing me to participate in the care of your patient.        Sincerely,        Ry Espinosa MD

## 2018-01-17 NOTE — PATIENT INSTRUCTIONS
General Scheduling Information  To schedule your CT/MRI scan, please contact [place] [facility] at [phone]   [address] [place] [address]    To schedule your Surgery, please contact our Specialty Schedulers at [phone]    ENT Clinic Locations Clinic Hours Telephone Number     [facility]  [address] [place] [address]   Tuesday:       8:00am -- 4:00pm    Wednesday:  8:00am - 4:00pm   To schedule an appointment with   Dr. Espinosa,   please contact our   Specialty Scheduling Department at:     [phone]       [facility]  [address] [place] [address]   Friday:          8:00am - 4:00pm         Urgent Care Locations Clinic Hours Telephone Numbers     [facility]  [address] [place] [address]     Monday-Friday:     11:00pm - 9:00pm    Saturday-Sunday:  9:00am - 5:00pm   [phone]     [facility]  [address] [place] [address]     Monday-Friday:      5:00pm - 9:00pm     Saturday-Sunday:  9:00am - 5:00pm   [phone]

## 2018-01-25 DIAGNOSIS — M05.79 RHEUMATOID ARTHRITIS INVOLVING MULTIPLE SITES WITH POSITIVE RHEUMATOID FACTOR (H): ICD-10-CM

## 2018-01-25 DIAGNOSIS — Z79.899 HIGH RISK MEDICATIONS (NOT ANTICOAGULANTS) LONG-TERM USE: ICD-10-CM

## 2018-01-29 RX ORDER — FOLIC ACID 1 MG/1
1 TABLET ORAL DAILY
Qty: 100 TABLET | Refills: 3 | Status: SHIPPED | OUTPATIENT
Start: 2018-01-29 | End: 2018-02-22

## 2018-01-29 NOTE — TELEPHONE ENCOUNTER
folic acid (FOLVITE) 1 MG tablet (Discontinued)      Last Written Prescription Date:  11/9/2016  Last Fill Quantity: 100,   # refills: 3  Last Office Visit: 12/14/2017  Future Office visit:    Next 5 appointments (look out 90 days)     Feb 07, 2018 11:00 AM CST   Return Visit with Nico Byers MD   [facility] [place] ([facility] [place])    [address]  [place] [address]   [phone]                   Routing refill request to provider for review/approval because:  Drug not active on patient's medication list

## 2018-02-05 ENCOUNTER — HOSPITAL ENCOUNTER (OUTPATIENT)
Facility: CLINIC | Age: 78
Setting detail: SPECIMEN
Discharge: HOME | End: 2018-02-05
Admitting: INTERNAL MEDICINE
Payer: COMMERCIAL

## 2018-02-05 DIAGNOSIS — Z79.899 HIGH RISK MEDICATIONS (NOT ANTICOAGULANTS) LONG-TERM USE: ICD-10-CM

## 2018-02-05 DIAGNOSIS — M05.79 RHEUMATOID ARTHRITIS INVOLVING MULTIPLE SITES WITH POSITIVE RHEUMATOID FACTOR (H): ICD-10-CM

## 2018-02-05 LAB
ALBUMIN SERPL-MCNC: 3.7 G/DL (ref 3.4–5)
ALP SERPL-CCNC: 50 U/L (ref 40–150)
ALT SERPL W P-5'-P-CCNC: 28 U/L (ref 0–50)
AST SERPL W P-5'-P-CCNC: 21 U/L (ref 0–45)
BASOPHILS # BLD AUTO: 0 10E9/L (ref 0–0.2)
BASOPHILS NFR BLD AUTO: 0.1 %
BILIRUB DIRECT SERPL-MCNC: <0.1 MG/DL (ref 0–0.2)
BILIRUB SERPL-MCNC: 0.3 MG/DL (ref 0.2–1.3)
CREAT SERPL-MCNC: 0.61 MG/DL (ref 0.52–1.04)
CRP SERPL-MCNC: 2.9 MG/L (ref 0–8)
DIFFERENTIAL METHOD BLD: NORMAL
EOSINOPHIL # BLD AUTO: 0.4 10E9/L (ref 0–0.7)
EOSINOPHIL NFR BLD AUTO: 5.5 %
ERYTHROCYTE [DISTWIDTH] IN BLOOD BY AUTOMATED COUNT: 14.8 % (ref 10–15)
ERYTHROCYTE [SEDIMENTATION RATE] IN BLOOD BY WESTERGREN METHOD: 25 MM/H (ref 0–30)
GFR SERPL CREATININE-BSD FRML MDRD: >90 ML/MIN/1.7M2
HCT VFR BLD AUTO: 38.7 % (ref 35–47)
HGB BLD-MCNC: 12.7 G/DL (ref 11.7–15.7)
LYMPHOCYTES # BLD AUTO: 1.6 10E9/L (ref 0.8–5.3)
LYMPHOCYTES NFR BLD AUTO: 22.3 %
MCH RBC QN AUTO: 31.3 PG (ref 26.5–33)
MCHC RBC AUTO-ENTMCNC: 32.8 G/DL (ref 31.5–36.5)
MCV RBC AUTO: 95 FL (ref 78–100)
MONOCYTES # BLD AUTO: 0.8 10E9/L (ref 0–1.3)
MONOCYTES NFR BLD AUTO: 11.4 %
NEUTROPHILS # BLD AUTO: 4.2 10E9/L (ref 1.6–8.3)
NEUTROPHILS NFR BLD AUTO: 60.7 %
PLATELET # BLD AUTO: 379 10E9/L (ref 150–450)
PROT SERPL-MCNC: 7.8 G/DL (ref 6.8–8.8)
RBC # BLD AUTO: 4.06 10E12/L (ref 3.8–5.2)
WBC # BLD AUTO: 6.9 10E9/L (ref 4–11)

## 2018-02-05 PROCEDURE — 82565 ASSAY OF CREATININE: CPT | Performed by: INTERNAL MEDICINE

## 2018-02-05 PROCEDURE — 86140 C-REACTIVE PROTEIN: CPT | Performed by: INTERNAL MEDICINE

## 2018-02-05 PROCEDURE — 36415 COLL VENOUS BLD VENIPUNCTURE: CPT | Performed by: INTERNAL MEDICINE

## 2018-02-05 PROCEDURE — 80076 HEPATIC FUNCTION PANEL: CPT | Performed by: INTERNAL MEDICINE

## 2018-02-05 PROCEDURE — 85025 COMPLETE CBC W/AUTO DIFF WBC: CPT | Performed by: INTERNAL MEDICINE

## 2018-02-05 PROCEDURE — 85652 RBC SED RATE AUTOMATED: CPT | Performed by: INTERNAL MEDICINE

## 2018-02-07 ENCOUNTER — RADIANT APPOINTMENT (OUTPATIENT)
Dept: GENERAL RADIOLOGY | Facility: CLINIC | Age: 78
End: 2018-02-07
Attending: INTERNAL MEDICINE
Payer: COMMERCIAL

## 2018-02-07 ENCOUNTER — OFFICE VISIT (OUTPATIENT)
Dept: RHEUMATOLOGY | Facility: CLINIC | Age: 78
End: 2018-02-07
Payer: COMMERCIAL

## 2018-02-07 ENCOUNTER — TELEPHONE (OUTPATIENT)
Dept: FAMILY MEDICINE | Facility: CLINIC | Age: 78
End: 2018-02-07

## 2018-02-07 VITALS
SYSTOLIC BLOOD PRESSURE: 125 MMHG | OXYGEN SATURATION: 94 % | DIASTOLIC BLOOD PRESSURE: 78 MMHG | RESPIRATION RATE: 16 BRPM | WEIGHT: 186.5 LBS | HEART RATE: 93 BPM | BODY MASS INDEX: 28.26 KG/M2 | HEIGHT: 68 IN

## 2018-02-07 DIAGNOSIS — M05.79 RHEUMATOID ARTHRITIS INVOLVING MULTIPLE SITES WITH POSITIVE RHEUMATOID FACTOR (H): ICD-10-CM

## 2018-02-07 DIAGNOSIS — M05.79 RHEUMATOID ARTHRITIS INVOLVING MULTIPLE SITES WITH POSITIVE RHEUMATOID FACTOR (H): Primary | ICD-10-CM

## 2018-02-07 DIAGNOSIS — Z79.899 HIGH RISK MEDICATION USE: ICD-10-CM

## 2018-02-07 PROCEDURE — 73130 X-RAY EXAM OF HAND: CPT | Mod: LT

## 2018-02-07 PROCEDURE — 73630 X-RAY EXAM OF FOOT: CPT | Mod: LT

## 2018-02-07 PROCEDURE — 99213 OFFICE O/P EST LOW 20 MIN: CPT | Performed by: INTERNAL MEDICINE

## 2018-02-07 RX ORDER — METHOTREXATE SODIUM 2.5 MG/1
22.5 TABLET ORAL WEEKLY
Qty: 108 TABLET | Refills: 2 | Status: SHIPPED | OUTPATIENT
Start: 2018-02-07 | End: 2018-03-07

## 2018-02-07 NOTE — PROGRESS NOTES
"[employer] message sent:  \"Ms. Sweats,    Foot x-rays showed many deformities consistent with rheumatoid arthritis.  This will be the new baseline that we can compare to in the future to assess for progressive disease.    Sincerely,  Nico Byers MD  2/7/2018 5:44 PM\""

## 2018-02-07 NOTE — NURSING NOTE
Patient mentions her Xeljanz XR 11mg needs a Prior Authorization and would like this done. She brought in a phone and fax number for [facility]: Phone: [phone] Fax: [phone].

## 2018-02-07 NOTE — MR AVS SNAPSHOT
After Visit Summary   2/7/2018    Ginger Marshall    MRN: [id number]           Patient Information     Date Of Birth          1940        Visit Information        Provider Department      2/7/2018 11:00 AM Nico Byers MD [facility]        Today's Diagnoses     Rheumatoid arthritis involving multiple sites with positive rheumatoid factor (H)    -  1    High risk medication use           Follow-ups after your visit        Follow-up notes from your care team     Return in about 6 months (around 8/7/2018) for f/u RA.      Your next 10 appointments already scheduled     May 07, 2018 10:30 AM CDT   LAB with [facility]   [facility] ([facility]    [address] [address]   [phone]           Please do not eat 10-12 hours before your appointment if you are coming in fasting for labs on lipids, cholesterol, or glucose (sugar). This does not apply to pregnant women. Water, hot tea and black coffee (with nothing added) are okay. Do not drink other fluids, diet soda or chew gum.            Aug 06, 2018 11:00 AM CDT   LAB with [facility]   [facility] ([facility]    [address] [address]   [phone]           Please do not eat 10-12 hours before your appointment if you are coming in fasting for labs on lipids, cholesterol, or glucose (sugar). This does not apply to pregnant women. Water, hot tea and black coffee (with nothing added) are okay. Do not drink other fluids, diet soda or chew gum.            Aug 08, 2018 11:00 AM CDT   Return Visit with Nico Byers MD   [facility] ([facility])    [address] [address]   [phone]              Future tests that were ordered for you today     Open Future Orders        Priority Expected Expires Ordered    XR Hand Bilateral G/E 3 Views Routine 2/7/2018 2/7/2019 2/7/2018    XR Foot Bilateral G/E 3  Views Routine 2/7/2018 2/7/2019 2/7/2018    Hepatic panel Routine 5/4/2018 5/23/2018 2/7/2018    Creatinine Routine 5/4/2018 5/23/2018 2/7/2018    CBC with platelets differential Routine 5/4/2018 5/23/2018 2/7/2018    CBC with platelets differential Routine 8/1/2018 8/21/2018 2/7/2018    Creatinine Routine 8/1/2018 8/21/2018 2/7/2018    Erythrocyte sedimentation rate auto Routine 8/1/2018 8/21/2018 2/7/2018    CRP inflammation Routine 8/1/2018 8/21/2018 2/7/2018    Hepatic panel Routine 8/1/2018 8/21/2018 2/7/2018    M Tuberculosis by Quantiferon Routine 5/4/2018 5/23/2018 2/7/2018            Who to contact     If you have questions or need follow up information about today's clinic visit or your schedule please contact [facility] directly at [phone].  Normal or non-critical lab and imaging results will be communicated to you by MyChart, letter or phone within 4 business days after the clinic has received the results. If you do not hear from us within 7 days, please contact the clinic through [email or web address] or phone. If you have a critical or abnormal lab result, we will notify you by phone as soon as possible.  Submit refill requests through [employer] or call your pharmacy and they will forward the refill request to us. Please allow 3 business days for your refill to be completed.          Additional Information About Your Visit        [email or web address] Information     [employer] gives you secure access to your electronic health record. If you see a primary care provider, you can also send messages to your care team and make appointments. If you have questions, please call your primary care clinic.  If you do not have a primary care provider, please call [phone] and they will assist you.        Care EveryWhere ID     This is your Care EveryWhere ID. This could be used by other organizations to access your [facility] medical records  [id number]        Your Vitals Were     Pulse Respirations Height Pulse Oximetry  "BMI (Body Mass Index)       93 16 1.721 m (5' 7.75\") 94% 28.57 kg/m2        Blood Pressure from Last 3 Encounters:   02/07/18 125/78   12/14/17 124/68   11/08/17 129/80    Weight from Last 3 Encounters:   02/07/18 84.6 kg (186 lb 8 oz)   01/17/18 83.6 kg (184 lb 3.2 oz)   12/14/17 82.9 kg (182 lb 12.8 oz)                 Where to get your medicines      These medications were sent to [facility] - [facility] - [address]  [address], [place] [address]     Phone:  [phone]     methotrexate 2.5 MG tablet    tofacitinib 11 MG 24 hr tablet          Primary Care Provider Office Phone # Fax #    Georgia Vázquez [phone] [address] [facility] [facility] [address]        Equal Access to Services     [facility] AH: Hadii aad ku hadasho Soomaali, waaxda luqadaha, qaybta kaalmada adeegyada, waxay kristinein haywilian hanks . So [facility] [phone].    ATENCIÓN: Si habla español, tiene a herrera disposición servicios gratuitos de asistencia lingüística. [facility] [phone].    We comply with applicable federal civil rights laws and [place] laws. We do not discriminate on the basis of race, color, national origin, age, disability, sex, sexual orientation, or gender identity.            Thank you!     Thank you for choosing [facility]  for your care. Our goal is always to provide you with excellent care. Hearing back from our patients is one way we can continue to improve our services. Please take a few minutes to complete the written survey that you may receive in the mail after your visit with us. Thank you!             Your Updated Medication List - Protect others around you: Learn how to safely use, store and throw away your medicines at www.disposemymeds.org.          This list is accurate as of 2/7/18 11:26 AM.  Always use your most recent med list.                   Brand Name Dispense Instructions for use Diagnosis    ascorbic " acid 1000 MG Tabs tablet      Take 1 tablet by mouth 3 times daily        aspirin 81 MG tablet      Take 1 tablet by mouth daily.        atorvastatin 40 MG tablet    LIPITOR    90 tablet    Take 1 tablet (40 mg) by mouth daily    Hyperlipidemia LDL goal <100       CALCIUM CITRATE + PO      Take 2 tablets by mouth daily        cetirizine 10 MG tablet    zyrTEC     Take 10 mg by mouth daily        cyanocobalamin 500 MCG Tabs      Take 1 tablet by mouth 2 times daily        cyclobenzaprine 10 MG tablet    FLEXERIL    60 tablet    Take 1 tablet (10 mg) by mouth 2 times daily as needed for muscle spasms    S/P lumbar fusion       ferrous gluconate 324 (38 FE) MG tablet    FERGON    270 tablet    Take 1 tablet (324 mg) by mouth 3 times daily (with meals)    Low iron       * FOLIC ACID PO      Take 800 mcg by mouth daily        * folic acid 1 MG tablet    FOLVITE    100 tablet    Take 1 tablet (1 mg) by mouth daily    Rheumatoid arthritis involving multiple sites with positive rheumatoid factor (H), High risk medications (not anticoagulants) long-term use       magnesium oxide 400 (241.3 MG) MG tablet    MAG-OX     Take 1 tablet (400 mg) by mouth daily        methotrexate 2.5 MG tablet     108 tablet    Take 9 tablets (22.5 mg) by mouth once a week    Rheumatoid arthritis involving multiple sites with positive rheumatoid factor (H)       Multi-vitamin Tabs tablet      Take 1 tablet by mouth daily        OMEGA-3 FISH OIL PO      Take 2,000 mg by mouth daily        order for [facility]      Respironics REMSTAR 60 Series Auto CPAP 12-15 cm H2O, Wisp nasal mask w/a s/m cushion        pantoprazole 40 MG EC tablet    PROTONIX    90 tablet    TAKE ONE TABLET BY MOUTH EVERY DAY 30-60 MINUTES BEFORE A MEAL    Gastroesophageal reflux disease without esophagitis       PREDNISONE PO      Take 20 mg by mouth as needed        PROBIOTIC DAILY PO           propylene glycol 0.6 % Soln ophthalmic solution    SYSTANE BALANCE     Place 1 drop into  both eyes 4 times daily Reported on 5/10/2017        tofacitinib 11 MG 24 hr tablet    XELJANZ XR    90 tablet    Take 1 tablet (11 mg) by mouth daily    Rheumatoid arthritis involving multiple sites with positive rheumatoid factor (H)       topiramate 25 MG tablet    TOPAMAX    360 tablet    Take 2 tablets (50 mg) by mouth 2 times daily    Non morbid obesity due to excess calories       TYLENOL 500 MG tablet   Generic drug:  acetaminophen     100 tablet    Take 2 tablets (1,000 mg) by mouth every 8 hours as needed for pain        vitamin B complex with vitamin C Tabs tablet      Take 1 tablet by mouth daily.        VITAMIN D-3 PO      Take 1,000 Units by mouth 2 times daily        VITAMIN E PO      Take 2,000 Units by mouth daily        ZOLOFT 25 MG tablet   Generic drug:  sertraline      Take 25 mg by mouth daily        * Notice:  This list has 2 medication(s) that are the same as other medications prescribed for you. Read the directions carefully, and ask your doctor or other care provider to review them with you.

## 2018-02-07 NOTE — PROGRESS NOTES
Rheumatology Clinic Visit      Ginger Marshall MRN# [id number]   YOB: 1940 Age: 77 year old      Date of visit: 2/07/18   PCP: Georgia Vázquez MD     Chief Complaint   Patient presents with:  RECHECK: 3 month check, some pains    Assessment and Plan   1. Seropositive ( [2009], CCP >100 [2009]; hx of rheumatoid nodule by 6/14/2011 pathology) Erosive Rheumatoid Arthritis: Previously failed remicade (staph infection during therapy, but was immediately after a joint injection) and orencia (migraines). Currently on methotrexate 22.5 mg once weekly, folic acid 800 mcg daily, Xeljanz XR 11mg daily. She also has prednisone 20 mg daily ×5 days to use as needed for flare. Methotrexate was reduced in the past with worsening symptoms.        Note that she had a history of oral sore that resolved with folic acid 2mg daily; she ran out of folic acid and did not want to restart it yet; she takes other supplements that total 800mcg of folic acid; if needed in the future may restart additional folic acid.  Continue current medication regimen.  Recheck x-rays to establish a new baseline. Needs PA for Xeljanz redone with change of insurance.    - Continue methotrexate 22.5 mg once weekly  - Continue folic acid 800mcg daily as noted above (from over-the-counter supplements)  - Continue Xeljanz XR 11mg daily  - X-rays today: bilateral hands/feet  - PRN Flare: prednisone 20mg daily x5days  - Labs in 3 months: CBC, Cr, Hepatic Panel, Quantiferon TB  - Labs 2-3 days prior to the next rheumatology clinic visit: CBC, Creatinine, Hepatic Panel, ESR, CRP              Rapid 3, cumulative scores                       02/07/2018: 4 (MTX 22.5mg wkly, Xeljanz XR 11mg daily)                       11/08/2017: 5 (MTX 22.5mg wkly, Xeljanz XR 11mg daily)                       08/09/2017: 0 (MTX 22.5mg wkly, Xeljanz XR 11mg daily)                      05/10/2017: 5 (MTX 25mg wkly, Xeljanz XR 11mg daily) RA doing well          2. Right hip  pain: Status post WALTER twice in the past. Followed by [facility] orthopedics.     3. Chronic back pain, improved s/p fusion: She has been to an orthopedic surgeon told her that she needed an operation but then when she went to see her orthopedic surgeon that did the hip replacement he told her not to have the surgery. She then went to see Dr. Michele, neurosurgeon, and had a lumbar fusion with significant improvement.     4. Bone Health: Managed by Endocrinology.     5. Iron Deficiency Anemia: Managed by PCP.     Ms. Marshall verbalized agreement with and understanding of the rational for the diagnosis and treatment plan.  All questions were answered to best of my ability and the patient's satisfaction. Ms. Marshall was advised to contact the clinic with any questions that may arise after the clinic visit.      Thank you for involving me in the care of the patient    Return to clinic: 6 months    HPI   Ginger Marshall is a 77 year old female with a history of multiple foot surgeries, hand tendon transfers, MCP replacements (most recent being in August 2015), bilateral TKA, right WALTER, left distal radius fracture history, s/p lumbar fusion, osteoporosis and seropositive (RF+, CCP+) erosive rheumatoid arthritis who presents for follow-up of rheumatoid arthritis.      Today, Ms. Marshall reports that her rheumatoid arthritis is doing great.  She says that she hates the cold weather here in [place] though because she lives at a senior living house and they do not shovel out the snow behind her car.  She says that she has to stay in [place] until her sister with leukemia dies and she has to follow through with her son who has throat cancer.  Every now and then she has some shoulder pain but this is short-lived.  Morning stiffness for no more than 3-5 minutes.  Positive gelling phenomenon that resolves as soon as she gets up and moving.  Some pain on the plantar aspect of her bilateral feet that is worse with the first few  steps.  Very happy with how well her RA is doing.     She continues to follow with endocrinology for osteoporosis management.    Denies fevers, chills, nausea, vomiting, constipation, diarrhea. No abdominal pain. No chest pain/pressure, palpitations, or shortness of breath. No nasal or oral sores.   No neck pain. No rash. No LE swelling.     Tobacco: quit in 1999  EtOH: 1 glass of wine every month at most  Drugs: None  Occupation: , retired     ROS   GEN: No fevers, chills  SKIN: No itching, rashes, sores  HEENT:  No oral or nasal ulcers.  CV: No chest pain, pressure, palpitations, or dyspnea on exertion.  PULM: No SOB, wheeze, cough.  GI:  No nausea, vomiting, constipation, diarrhea. No blood in stool. No abdominal pain.  : No blood in urine.  MSK: See HPI.  NEURO: No numbness or tingling  EXT: No LE swelling    Active Problem List     Patient Active Problem List   Diagnosis     RA (Rheumatoid Arthritis) off Plaquenil     Menopause     History of colonic polyps     Mitral Regurgitation     Multinodular goiter     CARDIOVASCULAR SCREENING; LDL GOAL LESS THAN 130     Psychophysiologic insomnia     Pulmonary nodule     PSEUDOPHAKIA OU     PVD (POSTERIOR VITREOUS DETACHMENT) OU     PXF (PSEUDOEXFOLIATION OF LENS CAPSULE) OD     GERD (gastroesophageal reflux disease)     Mild major depression (H)     Hyperlipidemia LDL goal <100     Advance Care Planning     Neuropathy     SHERRY (obstructive sleep apnea)-severe (AHI 35)     zEncounter for counseling     Anemia of chronic disease     Migraine     Osteoporosis     Cornea guttata, ou     Conjunctival concretions     Stenosis, spinal, lumbar     Other chronic pain     Obesity     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     MGD (meibomian gland dysfunction)     Blepharitis of both eyes     Personal history of healed osteoporosis fracture     Iron malabsorption     Hyperglycemia     Chronic bilateral low back pain without sciatica     Allergic  state, subsequent encounter     BMI 32.0-32.9,adult     Spinal stenosis of lumbar region without neurogenic claudication     Herniated nucleus pulposus, L3-4     S/P lumbar fusion     Anxiety     Past Medical History     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     History of total knee replacement 7/23/2009     Menopause late 40's     Other chronic pain     joints     Pelvic fracture (H) 5/13/2014     PUD (peptic ulcer disease)      Rheumatoid arteritis      Sleep apnea     Uses a CPAP     Vitamin B12 deficiency      Past Surgical History     Past Surgical History:   Procedure Laterality Date     ABDOMEN SURGERY      c-sections     ARTHROSCOPY KNEE RT/LT  01/06    left     BACK SURGERY  2013    disc     BREAST BIOPSY, RT/LT      left benign     BREAST SURGERY  90's    lumpectomy     C ANESTH,TOTAL HIP ARTHROPLASTY  2010    Rt hip     C HAND/FINGER SURGERY UNLISTED  11/05    right hand     C NONSPECIFIC PROCEDURE  91    left foot surgery     C NONSPECIFIC PROCEDURE  95    R MCP surgery     C TOTAL KNEE ARTHROPLASTY  2006    left     C/SECTION, LOW TRANSVERSE  66,72    x 2     CATARACT IOL, RT/LT       COLONOSCOPY  2006,2009     EYE SURGERY      cataracs     HC ESOPH/GAS REFLUX TEST W NASAL IMPED >1 HR  2/1/2012    Procedure:ESOPHAGEAL IMPEDENCE FUNCTION TEST WITH 24 HOUR PH GREATER THAN 1 HOUR; Surgeon:KAYKAY JULIO; Location:[facility]     IR CAUDAL EPIDURAL INJECTION SINGLE  5/2/2012    LESI L5-S1 at [facility]     OPTICAL TRACKING SYSTEM FUSION POSTERIOR SPINE LUMBAR N/A 4/3/2017    Procedure: OPTICAL TRACKING SYSTEM FUSION SPINE POSTERIOR LUMBAR ONE LEVEL;  Surgeon: Anthony Michele MD;  Location:  OR     SURGICAL HISTORY OF -   2007    right knee total replacement     Allergy     Allergies   Allergen Reactions     Abatacept      Severe headaches     Adhesive  "Tape      Plastic tape     Celebrex [Celecoxib]      Ineffective     Ethanol      Antihistamines     Orencia [Abatacept]      Headache     Septra [Bactrim]      Sulfa Drugs      \"deathly ill\"     Tramadol      Headache     Current Medication List     Current Outpatient Prescriptions   Medication Sig     methotrexate 2.5 MG tablet Take 9 tablets (22.5 mg) by mouth once a week     FOLIC ACID PO Take 800 mcg by mouth daily     tofacitinib (XELJANZ XR) 11 MG 24 hr tablet Take 1 tablet (11 mg) by mouth daily     PREDNISONE PO Take 20 mg by mouth as needed     atorvastatin (LIPITOR) 40 MG tablet Take 1 tablet (40 mg) by mouth daily     pantoprazole (PROTONIX) 40 MG EC tablet TAKE ONE TABLET BY MOUTH EVERY DAY 30-60 MINUTES BEFORE A MEAL     Probiotic Product (PROBIOTIC DAILY PO)      cyclobenzaprine (FLEXERIL) 10 MG tablet Take 1 tablet (10 mg) by mouth 2 times daily as needed for muscle spasms     topiramate (TOPAMAX) 25 MG tablet Take 2 tablets (50 mg) by mouth 2 times daily     cetirizine (ZYRTEC) 10 MG tablet Take 10 mg by mouth daily     sertraline (ZOLOFT) 25 MG tablet Take 25 mg by mouth daily      order for DME Respironics REMSTAR 60 Series Auto CPAP 12-15 cm H2O, Wisp nasal mask w/a s/m cushion     VITAMIN E PO Take 2,000 Units by mouth daily      Omega-3 Fatty Acids (OMEGA-3 FISH OIL PO) Take 2,000 mg by mouth daily      multivitamin, therapeutic with minerals (MULTI-VITAMIN) TABS Take 1 tablet by mouth daily     magnesium oxide (MAG-OX) 400 (241.3 MG) MG tablet Take 1 tablet (400 mg) by mouth daily     propylene glycol (SYSTANE BALANCE) 0.6 % SOLN ophthalmic solution Place 1 drop into both eyes 4 times daily Reported on 5/10/2017     ferrous gluconate (FERGON) 324 (38 FE) MG tablet Take 1 tablet (324 mg) by mouth 3 times daily (with meals)     cyanocobalamin 500 MCG TABS Take 1 tablet by mouth 2 times daily     Calcium Citrate-Vitamin D (CALCIUM CITRATE + PO) Take 2 tablets by mouth daily     Cholecalciferol " (VITAMIN D-3 PO) Take 1,000 Units by mouth 2 times daily     Ascorbic Acid 1000 MG TABS Take 1 tablet by mouth 3 times daily     acetaminophen (TYLENOL) 500 MG tablet Take 2 tablets (1,000 mg) by mouth every 8 hours as needed for pain     vitamin  B complex with vitamin C (VITAMIN  B COMPLEX) TABS Take 1 tablet by mouth daily.     aspirin 81 MG tablet Take 1 tablet by mouth daily.     folic acid (FOLVITE) 1 MG tablet Take 1 tablet (1 mg) by mouth daily (Patient not taking: Reported on 2018)     [DISCONTINUED] methotrexate 2.5 MG tablet Take 9 tablets (22.5 mg) by mouth once a week     [DISCONTINUED] tofacitinib (XELJANZ XR) 11 MG 24 hr tablet Take 1 tablet (11 mg) by mouth daily     [DISCONTINUED] methotrexate 2.5 MG tablet Take 10 tablets (25 mg) by mouth once a week .  Split dose between AM and PM (5 tabs in the morning, and 5 tabs in the evening; all taken within the same day each week)     No current facility-administered medications for this visit.      Social History   See HPI    Family History     Family History   Problem Relation Age of Onset     Arthritis Mother      Alzheimer Disease Mother      Hyperlipidemia Mother      OSTEOPOROSIS Mother      HEART DISEASE Father      MI ( from this)     Alcohol/Drug Father      Arthritis Sister      Hypertension Sister      CANCER Son      DIABETES Son      Neurologic Disorder Sister      Schizophrenic     Hypertension Sister      Hyperlipidemia Sister      MENTAL ILLNESS Sister      DIABETES Son      Other Cancer Son      No change in family history since the previous clinic visit.    Physical Exam     Temp Readings from Last 3 Encounters:   18 96.9  F (36.1  C) (Oral)   17 97.9  F (36.6  C) (Oral)   17 97.1  F (36.2  C) (Oral)     BP Readings from Last 5 Encounters:   18 125/78   17 124/68   17 129/80   17 118/73   17 111/68     Pulse Readings from Last 1 Encounters:   18 93     Resp Readings from Last 1  "Encounters:   02/07/18 16     Estimated body mass index is 28.57 kg/(m^2) as calculated from the following:    Height as of this encounter: 1.721 m (5' 7.75\").    Weight as of this encounter: 84.6 kg (186 lb 8 oz).    GEN: NAD, overweight  HEENT: MMM. No oral lesions. Anicteric, noninjected sclera  CV: S1, S2. RRR. No m/r/g.  PULM: CTA bilaterally. No w/c.  MSK: MCPs, PIPs, wrists, elbows, shoulders, knees, ankles, and MTPs without swelling or tenderness to palpation. Subluxation of the bilateral MCPs. Right hand with old surgical scars over the MCPs.   SKIN: No rash  EXT: No LE edema  PSYCH: Alert. Appropriate. Laughs a lot during the appointment.    Labs   RF/CCP  Recent Labs   Lab Test  09/09/11   0843   RHF  38*     CBC  Recent Labs   Lab Test  02/05/18   1021  11/06/17   0952  08/04/17   1026   WBC  6.9  7.4  5.8   RBC  4.06  3.98  3.86   HGB  12.7  12.5  11.9   HCT  38.7  38.8  37.0   MCV  95  98  96   RDW  14.8  14.2  16.5*   PLT  379  368  344   MCH  31.3  31.4  30.8   MCHC  32.8  32.2  32.2   NEUTROPHIL  60.7  65.3  58.9   LYMPH  22.3  18.0  25.1   MONOCYTE  11.4  12.4  12.2   EOSINOPHIL  5.5  4.2  3.6   BASOPHIL  0.1  0.1  0.2   ANEU  4.2  4.8  3.4   ALYM  1.6  1.3  1.5   MARYURI  0.8  0.9  0.7   AEOS  0.4  0.3  0.2   ABAS  0.0  0.0  0.0     CMP  Recent Labs   Lab Test  02/05/18   1021  11/06/17   0952  08/04/17   1026   03/27/17   1142   01/27/17   1511  10/28/16   1239   NA   --    --    --    --   139   --   142  143   POTASSIUM   --    --    --    --   4.0   --   4.1  4.3   CHLORIDE   --    --    --    --   107   --   107  109   CO2   --    --    --    --   25   --   25  27   ANIONGAP   --    --    --    --   7   --   10  7   GLC   --    --    --    --   96   --   98  97   BUN   --    --    --    --   11   --   10  11   CR  0.61  0.59  0.54   < >  0.62   < >  0.62  0.70   GFRESTIMATED  >90  >90  >90  Non African American GFR Calc     < >  >90  Non  GFR Calc     < >  >90  Non  " "American GFR Calc    81   GFRESTBLACK  >90  >90  >90  African American GFR Calc     < >  >90   GFR Calc     < >  >90   GFR Calc    >90   GFR Calc     BRANDEE   --    --    --    --   9.7   --   9.9  9.4   BILITOTAL  0.3  0.3  0.3   < >   --    < >  0.3  0.4   ALBUMIN  3.7  3.4  3.7   < >   --    < >  3.8  4.0   PROTTOTAL  7.8  7.5  7.8   < >   --    < >  7.6  7.6   ALKPHOS  50  50  50   < >   --    < >  45  46   AST  21  16  13   < >   --    < >  35  22   ALT  28  24  24   < >   --    < >  39  31    < > = values in this interval not displayed.     Calcium/VitaminD  Recent Labs   Lab Test  03/27/17   1142  01/27/17   1511  10/28/16   1239   02/05/13   1050   BRANDEE  9.7  9.9  9.4   < >  9.3   VITDT   --    --   37   --   33    < > = values in this interval not displayed.     ESR/CRP  Recent Labs   Lab Test  02/05/18   1021  11/06/17   0952  08/04/17   1026   SED  25  31*  31*   CRP  2.9  4.6  <2.9     Lipid Panel  Recent Labs   Lab Test  12/14/17   0957  10/28/16   1238  12/30/15   0842  01/13/14   1109  07/24/13   1024   04/13/12   0831   CHOL  164  176  182  135  177   --   167   TRIG  121  103  70  89  80   --   115   HDL  79  77  92  49*  89   --   59   LDL  61  78  76  68  72   < >  85   VLDL   --    --    --   18  16   --   23   CHOLHDLRATIO   --    --    --   2.8  2.0   --   2.8   NHDL  85  99  90   --    --    --    --     < > = values in this interval not displayed.     Hepatitis B  Recent Labs   Lab Test  09/15/15   1159  04/30/12   1005   AUSAB  0.11   --    HBCAB  Nonreactive   --    HEPBANG  Nonreactive  Negative     Hepatitis C  Recent Labs   Lab Test  09/15/15   1159  04/30/12   1005   HCVAB  Nonreactive   Assay performance characteristics have not been established for newborns,   infants, and children    Negative     Tuberculosis Screening  Recent Labs   Lab Test  09/15/15   1200  04/30/12   1006   TBRSLT  Negative  Negative   TBAGN  0.00  0.00     \"HAND " "BILATERAL THREE OR MORE VIEWS 9/15/2015 12:28 PM   HISTORY: Rheumatoid arthritis; establish baseline. Rheumatoid  arthritis(714.0)  COMPARISON: None  IMPRESSION  IMPRESSION: There is diffuse osteopenia. Postoperative changes of the  right second and third metacarpophalangeal joints. Moderate joint  space narrowing involving the left second and third  metacarpophalangeal joints. Mild joint space narrowing of the right  fourth and fifth metacarpophalangeal joints. There are also small  periarticular erosive changes of the metacarpophalangeal joints. There  is fusion of several of the right carpal bones. Marked joint space  loss in the left wrist. Findings are consistent with the clinical  history of rheumatoid arthritis. Chronic fracture deformities of the  right distal radius and ulna. No acute fracture is seen.  SPENCER MONSALVE MD\"    Immunization History     Immunization History   Administered Date(s) Administered     Influenza (High Dose) 3 valent vaccine 10/28/2013, 09/23/2014, 11/11/2015, 09/23/2016     Influenza (IIV3) PF 10/14/2007, 10/21/2009     Mantoux Tuberculin Skin Test 11/03/2006     Pneumo Conj 13-V (2010&after) 07/29/2015     Pneumococcal 23 valent 06/26/2000, 06/02/2010     TD (ADULT, 7+) 07/20/2009          Chart documentation done in part with Dragon Voice recognition Software. Although reviewed after completion, some word and grammatical error may remain.    Nico Byers MD  "

## 2018-02-07 NOTE — TELEPHONE ENCOUNTER
Prior Authorization Retail Medication Request  Medication/Dose: xeljanz xr 11  Diagnosis and ICD code: M057.9  New/Renewal/Insurance Change PA: Insurance change  Previously Tried and Failed Therapies: unknown    Insurance ID (if provided): [id number]  Insurance Phone (if provided): [phone]    Any additional info from fax request: Insurance will pay for 30 days today but will require a prior authorization for the rest of the year.     If you received a fax notification from an outside Pharmacy:  Pharmacy Name:[facility]  Pharmacy #:[phone]  Pharmacy Fax:[phone]    Thank you,  Kerry Lott, PharmD  [facility]  [phone]

## 2018-02-08 NOTE — TELEPHONE ENCOUNTER
Central Prior Authorization Team   Phone: [phone]      PA Initiation    Medication: xeljanz xr 11mg  Insurance Company: [employer] - Phone [phone] Fax [phone]  Pharmacy Filling the Rx: [facility] - [address]  Filling Pharmacy Phone: [phone]  Filling Pharmacy Fax: [phone]  Start Date: 2/8/2018

## 2018-02-09 NOTE — TELEPHONE ENCOUNTER
Prior Authorization Approval    Authorization Effective Date: 2/9/2018  Authorization Expiration Date: 2/9/2020  Medication: xeljanz xr 11mg -pa approved   Reference #: [id number]    Insurance Company: [employer] - Phone [phone] Fax [phone]  Expected CoPay: 8.35       Which Pharmacy is filling the prescription (Not needed for infusion/clinic administered): [facility] [place], [place] - [address] [facility]  Pharmacy Notified: Yes  Patient Notified: Yes

## 2018-02-20 ENCOUNTER — RADIANT APPOINTMENT (OUTPATIENT)
Dept: GENERAL RADIOLOGY | Facility: CLINIC | Age: 78
End: 2018-02-20
Attending: NURSE PRACTITIONER
Payer: COMMERCIAL

## 2018-02-20 ENCOUNTER — OFFICE VISIT (OUTPATIENT)
Dept: NEUROSURGERY | Facility: CLINIC | Age: 78
End: 2018-02-20
Payer: COMMERCIAL

## 2018-02-20 VITALS
SYSTOLIC BLOOD PRESSURE: 151 MMHG | HEART RATE: 94 BPM | TEMPERATURE: 97.7 F | DIASTOLIC BLOOD PRESSURE: 85 MMHG | OXYGEN SATURATION: 97 %

## 2018-02-20 DIAGNOSIS — Z98.1 S/P LUMBAR FUSION: ICD-10-CM

## 2018-02-20 DIAGNOSIS — M51.369 DEGENERATIVE DISC DISEASE, LUMBAR: ICD-10-CM

## 2018-02-20 DIAGNOSIS — M54.16 LUMBAR RADICULOPATHY: Primary | ICD-10-CM

## 2018-02-20 PROCEDURE — 99213 OFFICE O/P EST LOW 20 MIN: CPT | Performed by: NURSE PRACTITIONER

## 2018-02-20 PROCEDURE — 72100 X-RAY EXAM L-S SPINE 2/3 VWS: CPT

## 2018-02-20 NOTE — NURSING NOTE
"Ginger Marshall is a 77 year old female who presents for:  Chief Complaint   Patient presents with     Neurologic Problem     s/p lumbar fusion 4-3-17, back pain returnted- left leg        Initial Vitals:  There were no vitals taken for this visit. Estimated body mass index is 28.57 kg/(m^2) as calculated from the following:    Height as of 2/7/18: 5' 7.75\" (1.721 m).    Weight as of 2/7/18: 186 lb 8 oz (84.6 kg).. There is no height or weight on file to calculate BSA. BP completed using cuff size: regular, forearm  Data Unavailable    Do you feel safe in your environment?  Yes  Do you need any refills today? No    Nursing Comments: s/p lumbar fusion 4-3-17, back pain returnted- left leg   You rate your pain today as 3      Sujatha Campbell, TOSHA,AAS    "

## 2018-02-20 NOTE — PROGRESS NOTES
"[facility]  Neurosurgery followup:    HPI: Ginger Marshall is a 77 year old female who is s/p L3 Smith-Rodriguez osteotomy with L3-4 transforaminal lumbar interbody fusion with Dr. Anthony Michele on 4/3/17.  She describes increased LLE over the past few months.  Although it is not constant, she states it bothers her and has limited her activities.  She states it is a dull to sharp pain along the left buttock radiating along the left lateral leg, wrapping to the luz and extending into the foot.  Walking and prolonged standing aggravate her pain.  She states the pain has been present since prior to surgery, \"But it was mild compared to now.  She states that since her fusion in April of last year the severe low back and \"severe\" bilateral buttock pain has significantly improved, \"And I can stand straighter since surgery.\"  She has been taking Flexeril prn which she had leftover from surgery at home and finds it somewhat helpful.  She denies weakness to BLE or changes to bowel or bladder.  She denies falls.      Exam:  Constitutional:  Alert, well nourished, NAD.  HEENT: Normocephalic, atraumatic.   Pulm:  Without shortness of breath   CV:  No pitting edema of BLE.      Neurological:  Awake  Alert  Oriented x 3  Motor exam:        IP Q DF PF EHL  R   5  5   5   5    5  L   5  5   5   5    5     Lumbar examination with tenderness to right paraspinous muscles. SLR negative bilaterally.  Limited ROM.    Able to spontaneously move L/E bilaterally  Sensation intact throughout all L/E dermatomes     Incision: Well healed.  Imaging: per Xray fusion intact and stable.    A/P:   Ginger Marshall is a 77 year old female who is s/p L3 Smith-Rodriguez osteotomy with L3-4 transforaminal lumbar interbody fusion with Dr. Anthony Michele on 4/3/17.  She describes increased LLE over the past few months.  Although it is not constant, she states it bothers her and has limited her activities.  She states it is a dull to sharp pain " "along the left buttock radiating along the left lateral leg, wrapping to the luz and extending into the foot.  Walking and prolonged standing aggravate her pain.  She states the pain has been present since prior to surgery, \"But it was mild compared to now.  She states that since her fusion in April of last year the severe low back and \"severe\" bilateral buttock pain has significantly improved, \"And I can stand straighter since surgery.\"  She has been taking Flexeril prn which she had leftover from surgery at home and finds it somewhat helpful.  She denies weakness to BLE or changes to bowel or bladder.  She denies falls.  We reviewed Xrays from today, fusion appears intact.  We discussed ordering a lumbar MRI w/ and w/o contrast considering her leg pain has worsened.  Once results received we will contact her.      Patient Instructions   Schedule lumbar MRI  We will call you with results  Please contact the clinic if pain persists at [phone].          [facility]  [facility]  [address] [address]    Tel [phone]  Pager [phone]      "

## 2018-02-20 NOTE — LETTER
"    2/20/2018         RE: Ginger Marshall  [address]   [place] [address]        Dear Colleague,    Thank you for referring your patient, Ginger Marshall, to the [facility]. Please see a copy of my visit note below.    [facility]  Neurosurgery followup:    HPI: Ginger Marshall is a 77 year old female who is s/p L3 Smith-Rodriguez osteotomy with L3-4 transforaminal lumbar interbody fusion with Dr. Anthony Michele on 4/3/17.  She describes increased LLE over the past few months.  Although it is not constant, she states it bothers her and has limited her activities.  She states it is a dull to sharp pain along the left buttock radiating along the left lateral leg, wrapping to the luz and extending into the foot.  Walking and prolonged standing aggravate her pain.  She states the pain has been present since prior to surgery, \"But it was mild compared to now.  She states that since her fusion in April of last year the severe low back and \"severe\" bilateral buttock pain has significantly improved, \"And I can stand straighter since surgery.\"  She has been taking Flexeril prn which she had leftover from surgery at home and finds it somewhat helpful.  She denies weakness to BLE or changes to bowel or bladder.  She denies falls.      Exam:  Constitutional:  Alert, well nourished, NAD.  HEENT: Normocephalic, atraumatic.   Pulm:  Without shortness of breath   CV:  No pitting edema of BLE.      Neurological:  Awake  Alert  Oriented x 3  Motor exam:        IP Q DF PF EHL  R   5  5   5   5    5  L   5  5   5   5    5     Lumbar examination with tenderness to right paraspinous muscles. SLR negative bilaterally.  Limited ROM.    Able to spontaneously move L/E bilaterally  Sensation intact throughout all L/E dermatomes     Incision: Well healed.  Imaging: per Xray fusion intact and stable.    A/P:   Ginger Marshall is a 77 year old female who is s/p L3 Smith-Rodriguez osteotomy with L3-4 " "transforaminal lumbar interbody fusion with Dr. Anthony Michele on 4/3/17.  She describes increased LLE over the past few months.  Although it is not constant, she states it bothers her and has limited her activities.  She states it is a dull to sharp pain along the left buttock radiating along the left lateral leg, wrapping to the luz and extending into the foot.  Walking and prolonged standing aggravate her pain.  She states the pain has been present since prior to surgery, \"But it was mild compared to now.  She states that since her fusion in April of last year the severe low back and \"severe\" bilateral buttock pain has significantly improved, \"And I can stand straighter since surgery.\"  She has been taking Flexeril prn which she had leftover from surgery at home and finds it somewhat helpful.  She denies weakness to BLE or changes to bowel or bladder.  She denies falls.  We reviewed Xrays from today, fusion appears intact.  We discussed ordering a lumbar MRI w/ and w/o contrast considering her leg pain has worsened.  Once results received we will contact her.      Patient Instructions   Schedule lumbar MRI  We will call you with results  Please contact the clinic if pain persists at [phone].          Carmen Fierro [facility]  [facility]  [address] [address]    Tel [phone]  Pager [phone]        Again, thank you for allowing me to participate in the care of your patient.        Sincerely,        Carmen Fierro, NP    "

## 2018-02-20 NOTE — MR AVS SNAPSHOT
After Visit Summary   2/20/2018    Ginger Marshall    MRN: [id number]           Patient Information     Date Of Birth          1940        Visit Information        Provider Department      2/20/2018 12:00 PM Carmen Fierro NP [facility]        Today's Diagnoses     Lumbar radiculopathy    -  1    Degenerative disc disease, lumbar        S/P lumbar fusion          Care Instructions    Schedule lumbar MRI  We will call you with results  Please contact the clinic if pain persists at [phone].            Follow-ups after your visit        Your next 10 appointments already scheduled     May 07, 2018 10:30 AM CDT   LAB with [facility]   [facility] ([facility])    [address] [address] [phone]           Please do not eat 10-12 hours before your appointment if you are coming in fasting for labs on lipids, cholesterol, or glucose (sugar). This does not apply to pregnant women. Water, hot tea and black coffee (with nothing added) are okay. Do not drink other fluids, diet soda or chew gum.            Aug 06, 2018 11:00 AM CDT   LAB with [facility]   [facility] ([facility])    [address] [address] [phone]           Please do not eat 10-12 hours before your appointment if you are coming in fasting for labs on lipids, cholesterol, or glucose (sugar). This does not apply to pregnant women. Water, hot tea and black coffee (with nothing added) are okay. Do not drink other fluids, diet soda or chew gum.            Aug 08, 2018 11:00 AM CDT   Return Visit with Nico Byers MD   [facility] ([facility])    [address] [address] [phone]              Future tests that were ordered for you today     Open Future Orders        Priority Expected Expires Ordered    MR Lumbar Spine w/o & w Contrast Routine  2/20/2019 2/20/2018             Who to contact     If you have questions or need follow up information about today's clinic visit or your schedule please contact [facility] directly at [phone].  Normal or non-critical lab and imaging results will be communicated to you by MyChart, letter or phone within 4 business days after the clinic has received the results. If you do not hear from us within 7 days, please contact the clinic through MyChart or phone. If you have a critical or abnormal lab result, we will notify you by phone as soon as possible.  Submit refill requests through [employer] or call your pharmacy and they will forward the refill request to us. Please allow 3 business days for your refill to be completed.          Additional Information About Your Visit        [employer]l Information     [employer] gives you secure access to your electronic health record. If you see a primary care provider, you can also send messages to your care team and make appointments. If you have questions, please call your primary care clinic.  If you do not have a primary care provider, please call [phone] and they will assist you.        Care EveryWhere ID     This is your Care EveryWhere ID. This could be used by other organizations to access your [facility] medical records  [id number]        Your Vitals Were     Pulse Temperature Pulse Oximetry Breastfeeding?          94 97.7  F (36.5  C) (Oral) 97% No         Blood Pressure from Last 3 Encounters:   02/20/18 151/85   02/07/18 125/78   12/14/17 124/68    Weight from Last 3 Encounters:   02/07/18 186 lb 8 oz (84.6 kg)   01/17/18 184 lb 3.2 oz (83.6 kg)   12/14/17 182 lb 12.8 oz (82.9 kg)               Primary Care Provider Office Phone # Fax #    Georgia Vázquez [phone] [address] [place] [address]        Equal Access to Services     SHANIQUE WATSON AH: Olinda baileyo Sodasia, waaxda luqadaha, qaybta kaallea quiroga, aj maciel  sindy carmenaagiuliana ah. So [facility] [phone].    ATENCIÓN: Si samir adan, tiene a herrera disposición servicios gratuitos de asistencia lingüística. Taina martinez [phone].    We comply with applicable federal civil rights laws and [place] laws. We do not discriminate on the basis of race, color, national origin, age, disability, sex, sexual orientation, or gender identity.            Thank you!     Thank you for choosing [facility]  for your care. Our goal is always to provide you with excellent care. Hearing back from our patients is one way we can continue to improve our services. Please take a few minutes to complete the written survey that you may receive in the mail after your visit with us. Thank you!             Your Updated Medication List - Protect others around you: Learn how to safely use, store and throw away your medicines at www.disposemymeds.org.          This list is accurate as of 2/20/18 12:15 PM.  Always use your most recent med list.                   Brand Name Dispense Instructions for use Diagnosis    ascorbic acid 1000 MG Tabs tablet      Take 1 tablet by mouth 3 times daily        aspirin 81 MG tablet      Take 1 tablet by mouth daily.        atorvastatin 40 MG tablet    LIPITOR    90 tablet    Take 1 tablet (40 mg) by mouth daily    Hyperlipidemia LDL goal <100       CALCIUM CITRATE + PO      Take 2 tablets by mouth daily        cetirizine 10 MG tablet    zyrTEC     Take 10 mg by mouth daily        cyanocobalamin 500 MCG Tabs      Take 1 tablet by mouth 2 times daily        cyclobenzaprine 10 MG tablet    FLEXERIL    60 tablet    Take 1 tablet (10 mg) by mouth 2 times daily as needed for muscle spasms    S/P lumbar fusion       ferrous gluconate 324 (38 FE) MG tablet    FERGON    270 tablet    Take 1 tablet (324 mg) by mouth 3 times daily (with meals)    Low iron       * FOLIC ACID PO      Take 800 mcg by mouth daily        * folic acid 1 MG tablet    FOLVITE    100 tablet    Take 1  tablet (1 mg) by mouth daily    Rheumatoid arthritis involving multiple sites with positive rheumatoid factor (H), High risk medications (not anticoagulants) long-term use       magnesium oxide 400 (241.3 MG) MG tablet    MAG-OX     Take 1 tablet (400 mg) by mouth daily        methotrexate 2.5 MG tablet     108 tablet    Take 9 tablets (22.5 mg) by mouth once a week    Rheumatoid arthritis involving multiple sites with positive rheumatoid factor (H)       Multi-vitamin Tabs tablet      Take 1 tablet by mouth daily        OMEGA-3 FISH OIL PO      Take 2,000 mg by mouth daily        order for DME      Respironics REMSTAR 60 Series Auto CPAP 12-15 cm H2O, Wisp nasal mask w/a s/m cushion        pantoprazole 40 MG EC tablet    PROTONIX    90 tablet    TAKE ONE TABLET BY MOUTH EVERY DAY 30-60 MINUTES BEFORE A MEAL    Gastroesophageal reflux disease without esophagitis       PREDNISONE PO      Take 20 mg by mouth as needed        PROBIOTIC DAILY PO           propylene glycol 0.6 % Soln ophthalmic solution    SYSTANE BALANCE     Place 1 drop into both eyes 4 times daily Reported on 5/10/2017        tofacitinib 11 MG 24 hr tablet    XELJANZ XR    90 tablet    Take 1 tablet (11 mg) by mouth daily    Rheumatoid arthritis involving multiple sites with positive rheumatoid factor (H)       topiramate 25 MG tablet    TOPAMAX    360 tablet    Take 2 tablets (50 mg) by mouth 2 times daily    Non morbid obesity due to excess calories       TYLENOL 500 MG tablet   Generic drug:  acetaminophen     100 tablet    Take 2 tablets (1,000 mg) by mouth every 8 hours as needed for pain        vitamin B complex with vitamin C Tabs tablet      Take 1 tablet by mouth daily.        VITAMIN D-3 PO      Take 1,000 Units by mouth 2 times daily        VITAMIN E PO      Take 2,000 Units by mouth daily        ZOLOFT 25 MG tablet   Generic drug:  sertraline      Take 25 mg by mouth daily        * Notice:  This list has 2 medication(s) that are the same  as other medications prescribed for you. Read the directions carefully, and ask your doctor or other care provider to review them with you.

## 2018-02-22 ENCOUNTER — OFFICE VISIT (OUTPATIENT)
Dept: FAMILY MEDICINE | Facility: CLINIC | Age: 78
End: 2018-02-22
Payer: COMMERCIAL

## 2018-02-22 VITALS
TEMPERATURE: 97.2 F | HEIGHT: 68 IN | BODY MASS INDEX: 28.04 KG/M2 | HEART RATE: 84 BPM | DIASTOLIC BLOOD PRESSURE: 69 MMHG | OXYGEN SATURATION: 96 % | SYSTOLIC BLOOD PRESSURE: 106 MMHG | WEIGHT: 185 LBS

## 2018-02-22 DIAGNOSIS — B02.9 HERPES ZOSTER WITHOUT COMPLICATION: ICD-10-CM

## 2018-02-22 DIAGNOSIS — R21 RASH: Primary | ICD-10-CM

## 2018-02-22 PROCEDURE — 99213 OFFICE O/P EST LOW 20 MIN: CPT | Performed by: NURSE PRACTITIONER

## 2018-02-22 RX ORDER — TRIAMCINOLONE ACETONIDE 1 MG/G
CREAM TOPICAL
Qty: 30 G | Refills: 0 | Status: SHIPPED | OUTPATIENT
Start: 2018-02-22 | End: 2018-06-22

## 2018-02-22 ASSESSMENT — PAIN SCALES - GENERAL: PAINLEVEL: NO PAIN (0)

## 2018-02-22 ASSESSMENT — ANXIETY QUESTIONNAIRES
3. WORRYING TOO MUCH ABOUT DIFFERENT THINGS: NOT AT ALL
GAD7 TOTAL SCORE: 0
7. FEELING AFRAID AS IF SOMETHING AWFUL MIGHT HAPPEN: NOT AT ALL
1. FEELING NERVOUS, ANXIOUS, OR ON EDGE: NOT AT ALL
6. BECOMING EASILY ANNOYED OR IRRITABLE: NOT AT ALL
2. NOT BEING ABLE TO STOP OR CONTROL WORRYING: NOT AT ALL
IF YOU CHECKED OFF ANY PROBLEMS ON THIS QUESTIONNAIRE, HOW DIFFICULT HAVE THESE PROBLEMS MADE IT FOR YOU TO DO YOUR WORK, TAKE CARE OF THINGS AT HOME, OR GET ALONG WITH OTHER PEOPLE: NOT DIFFICULT AT ALL
5. BEING SO RESTLESS THAT IT IS HARD TO SIT STILL: NOT AT ALL

## 2018-02-22 ASSESSMENT — PATIENT HEALTH QUESTIONNAIRE - PHQ9: 5. POOR APPETITE OR OVEREATING: NOT AT ALL

## 2018-02-22 NOTE — PROGRESS NOTES
SUBJECTIVE:   Ginger Marshall is a 77 year old female who presents to clinic today for the following health issues:      Patient is here today with the concern for 4 bumps on her back for a week that itches.    Minimal pain. Mostly itching  She had shingles in same location years ago  Has not received vaccine d/t RA  Has not tried anything  No blisters  Anti-histamines make her jittery      Problem list and histories reviewed & adjusted, as indicated.  Additional history: none    Patient Active Problem List   Diagnosis     RA (Rheumatoid Arthritis) off Plaquenil     Menopause     History of colonic polyps     Mitral Regurgitation     Multinodular goiter     CARDIOVASCULAR SCREENING; LDL GOAL LESS THAN 130     Psychophysiologic insomnia     Pulmonary nodule     PSEUDOPHAKIA OU     PVD (POSTERIOR VITREOUS DETACHMENT) OU     PXF (PSEUDOEXFOLIATION OF LENS CAPSULE) OD     GERD (gastroesophageal reflux disease)     Mild major depression (H)     Hyperlipidemia LDL goal <100     Advance Care Planning     Neuropathy     SHERRY (obstructive sleep apnea)-severe (AHI 35)     zEncounter for counseling     Anemia of chronic disease     Migraine     Osteoporosis     Cornea guttata, ou     Conjunctival concretions     Stenosis, spinal, lumbar     Other chronic pain     Obesity     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     MGD (meibomian gland dysfunction)     Blepharitis of both eyes     Personal history of healed osteoporosis fracture     Iron malabsorption     Hyperglycemia     Chronic bilateral low back pain without sciatica     Allergic state, subsequent encounter     BMI 32.0-32.9,adult     Spinal stenosis of lumbar region without neurogenic claudication     Herniated nucleus pulposus, L3-4     S/P lumbar fusion     Anxiety     Past Surgical History:   Procedure Laterality Date     ABDOMEN SURGERY      c-sections     ARTHROSCOPY KNEE RT/LT  01/06    left     BACK SURGERY  2013    disc     BREAST BIOPSY, RT/LT       left benign     BREAST SURGERY  90's    lumpectomy     C ANESTH,TOTAL HIP ARTHROPLASTY      Rt hip     C HAND/FINGER SURGERY UNLISTED      right hand     C NONSPECIFIC PROCEDURE  91    left foot surgery     C NONSPECIFIC PROCEDURE  95    R MCP surgery     C TOTAL KNEE ARTHROPLASTY      left     C/SECTION, LOW TRANSVERSE  66,72    x 2     CATARACT IOL, RT/LT       COLONOSCOPY  ,     EYE SURGERY      cataracs     HC ESOPH/GAS REFLUX TEST W NASAL IMPED >1 HR  2012    Procedure:ESOPHAGEAL IMPEDENCE FUNCTION TEST WITH 24 HOUR PH GREATER THAN 1 HOUR; Surgeon:KAYKAY JULIO; Location:[facility]     IR CAUDAL EPIDURAL INJECTION SINGLE  2012    LESI L5-S1 at [facility]     OPTICAL TRACKING SYSTEM FUSION POSTERIOR SPINE LUMBAR N/A 4/3/2017    Procedure: OPTICAL TRACKING SYSTEM FUSION SPINE POSTERIOR LUMBAR ONE LEVEL;  Surgeon: Anthony Michele MD;  Location: [facility]     SURGICAL HISTORY OF -       right knee total replacement       Social History   Substance Use Topics     Smoking status: Former Smoker     Packs/day: 1.00     Years: 41.00     Types: Cigarettes     Quit date: 1999     Smokeless tobacco: Never Used     Alcohol use 0.0 oz/week      Comment: Periodically.     Family History   Problem Relation Age of Onset     Arthritis Mother      Alzheimer Disease Mother      Hyperlipidemia Mother      OSTEOPOROSIS Mother      HEART DISEASE Father      MI ( from this)     Alcohol/Drug Father      Arthritis Sister      Hypertension Sister      CANCER Son      DIABETES Son      Neurologic Disorder Sister      Schizophrenic     Hypertension Sister      Hyperlipidemia Sister      MENTAL ILLNESS Sister      DIABETES Son      Other Cancer Son            Reviewed and updated as needed this visit by clinical staff  Tobacco  Allergies  Meds  Med Hx  Surg Hx  Fam Hx  Soc Hx      Reviewed and updated as needed this visit by Provider         ROS:  Constitutional, HEENT, cardiovascular,  "pulmonary, gi and gu systems are negative, except as otherwise noted.    OBJECTIVE:     /69 (BP Location: Right arm, Patient Position: Chair, Cuff Size: Adult Large)  Pulse 84  Temp 97.2  F (36.2  C) (Oral)  Ht 5' 7.5\" (1.715 m)  Wt 185 lb (83.9 kg)  SpO2 96%  Breastfeeding? No  BMI 28.55 kg/m2  Body mass index is 28.55 kg/(m^2).  GENERAL: healthy, alert and no distress  SKIN: 4 erythematous papules on left back in linear pattern    Diagnostic Test Results:  none     ASSESSMENT/PLAN:       ICD-10-CM    1. Rash R21 triamcinolone (KENALOG) 0.1 % cream   2. Herpes zoster without complication B02.9        No blisters and minimal pain. Does not appear like typical shingles. Could be very mild case, and as lesions present > 48 hours, outside of treatment window. If rash worsens, lesions spreading, developing pain and blisters, call clinic and we can discuss initiating anti-virals since still developing new lesions.   Otherwise, will treat with topical steroid. Can use Sarna for itch relief.     AKIRA [facility]  "

## 2018-02-22 NOTE — MR AVS SNAPSHOT
After Visit Summary   2/22/2018    Ginger Marshall    MRN: [id number]           Patient Information     Date Of Birth          1940        Visit Information        Provider Department      2/22/2018 11:40 AM Mary Prieto APRN CNP [facility]        Today's Diagnoses     Rash    -  1    Herpes zoster without complication          Care Instructions    If the rash worsens, develops blisters, or it appears more like shingles, call the clinic and we can discuss starting you on an anti-viral          Follow-ups after your visit        Your next 10 appointments already scheduled     Feb 22, 2018 11:40 AM CST   SHORT with AKIRA Knox CNP   [facility] ([facility])    [address] [id number] [phone]            Mar 07, 2018 11:00 AM CST   (Arrive by 10:45 AM)   MR LUMBAR SPINE W/O & W CONTRAST with BEMR1   [facility] ([facility]    [id number] [place] [address] [phone]           Take your medicines as usual, unless your doctor tells you not to. Bring a list of your current medicines to your exam (including vitamins, minerals and over-the-counter drugs).  You may or may not receive intravenous (IV) contrast for this exam pending the discretion of the Radiologist.  You do not need to do anything special to prepare.  The MRI machine uses a strong magnet. Please wear clothes without metal (snaps, zippers). A sweatsuit works well, or we may give you a hospital gown.  Please remove any body piercings and hair extensions before you arrive. You will also remove watches, jewelry, hairpins, wallets, dentures, partial dental plates and hearing aids. You may wear contact lenses, and you may be able to wear your rings. We have a safe place to keep your personal items, but it is safer to leave them at home.  **IMPORTANT** THE  INSTRUCTIONS BELOW ARE ONLY FOR THOSE PATIENTS WHO HAVE BEEN PRESCRIBED SEDATION OR GENERAL ANESTHESIA DURING THEIR MRI PROCEDURE:  IF YOUR DOCTOR PRESCRIBED ORAL SEDATION (take medicine to help you relax during your exam):   You must get the medicine from your doctor (oral medication) before you arrive. Bring the medicine to the exam. Do not take it at home. You ll be told when to take it upon arriving for your exam.   Arrive one hour early. Bring someone who can take you home after the test. Your medicine will make you sleepy. After the exam, you may not drive, take a bus or take a taxi by yourself.  IF YOUR DOCTOR PRESCRIBED IV SEDATION:   Arrive one hour early. Bring someone who can take you home after the test. Your medicine will make you sleepy. After the exam, you may not drive, take a bus or take a taxi by yourself.   No eating 6 hours before your exam. You may have clear liquids up until 4 hours before your exam. (Clear liquids include water, clear tea, black coffee and fruit juice without pulp.)  IF YOUR DOCTOR PRESCRIBED ANESTHESIA (be asleep for your exam):   Arrive 1 1/2 hours early. Bring someone who can take you home after the test. You may not drive, take a bus or take a taxi by yourself.   No eating 8 hours before your exam. You may have clear liquids up until 4 hours before your exam. (Clear liquids include water, clear tea, black coffee and fruit juice without pulp.)   You will spend four to five hours in the recovery room.  Please call the Imaging Department at your exam site with any questions.            May 07, 2018 10:30 AM CDT   LAB with  LAB   [facility] [place] ([facility] [place] [facility])    [address]  [place] [address]   [phone]           Please do not eat 10-12 hours before your appointment if you are coming in fasting for labs on lipids, cholesterol, or glucose (sugar). This does not apply to pregnant women. Water, hot tea and black coffee (with nothing  added) are okay. Do not drink other fluids, diet soda or chew gum.            Aug 06, 2018 11:00 AM CDT   LAB with FZ LAB   [facility]    [address]  [place] [address] [phone]           Please do not eat 10-12 hours before your appointment if you are coming in fasting for labs on lipids, cholesterol, or glucose (sugar). This does not apply to pregnant women. Water, hot tea and black coffee (with nothing added) are okay. Do not drink other fluids, diet soda or chew gum.            Aug 08, 2018 11:00 AM CDT   Return Visit with Nico Byers MD   [facility]    [address]  [place] [id number] [phone]              Who to contact     If you have questions or need follow up information about today's clinic visit or your schedule please contact [facility] directly at [phone].  Normal or non-critical lab and imaging results will be communicated to you by BFKWhart, letter or phone within 4 business days after the clinic has received the results. If you do not hear from us within 7 days, please contact the clinic through [employer] or phone. If you have a critical or abnormal lab result, we will notify you by phone as soon as possible.  Submit refill requests through [employer] or call your pharmacy and they will forward the refill request to us. Please allow 3 business days for your refill to be completed.          Additional Information About Your Visit        [employer] Information     [employer] gives you secure access to your electronic health record. If you see a primary care provider, you can also send messages to your care team and make appointments. If you have questions, please call your primary care clinic.  If you do not have a primary care provider, please call [phone] and they will assist you.        Care EveryWhere ID     This is your Care EveryWhere ID. This could be  "used by other organizations to access your [facility] medical records  [id number]        Your Vitals Were     Pulse Temperature Height Pulse Oximetry Breastfeeding? BMI (Body Mass Index)    84 97.2  F (36.2  C) (Oral) 5' 7.5\" (1.715 m) 96% No 28.55 kg/m2       Blood Pressure from Last 3 Encounters:   02/22/18 106/69   02/20/18 151/85   02/07/18 125/78    Weight from Last 3 Encounters:   02/22/18 185 lb (83.9 kg)   02/07/18 186 lb 8 oz (84.6 kg)   01/17/18 184 lb 3.2 oz (83.6 kg)              Today, you had the following     No orders found for display         Today's Medication Changes          These changes are accurate as of 2/22/18 11:35 AM.  If you have any questions, ask your nurse or doctor.               Start taking these medicines.        Dose/Directions    triamcinolone 0.1 % cream   Commonly known as:  KENALOG   Used for:  Rash   Started by:  Mary Prieto APRN CNP        Apply sparingly to affected area three times daily for 14 days.   Quantity:  30 g   Refills:  0            Where to get your medicines      These medications were sent to [facility] - [address] [place] [address]  [address], [place] [address]     Phone:  [phone]     triamcinolone 0.1 % cream                Primary Care Provider Office Phone # Fax #    Georgia Vázquez [phone]       [address] [place] [address]        Equal Access to Services     [facility] AH: Hadii jonah grant hadasho Soomaali, waaxda luqadaha, qaybta kaalmada aderamy, aj fernandez. So [facility] [phone].    ATENCIÓN: Si habla español, tiene a herrera disposición servicios gratuitos de asistencia lingüística. Llame al [phone].    We comply with applicable federal civil rights laws and [place] laws. We do not discriminate on the basis of race, color, national origin, age, disability, sex, sexual orientation, or gender identity.            Thank you!     " Thank you for choosing [facility]  for your care. Our goal is always to provide you with excellent care. Hearing back from our patients is one way we can continue to improve our services. Please take a few minutes to complete the written survey that you may receive in the mail after your visit with us. Thank you!             Your Updated Medication List - Protect others around you: Learn how to safely use, store and throw away your medicines at www.disposemymeds.org.          This list is accurate as of 2/22/18 11:35 AM.  Always use your most recent med list.                   Brand Name Dispense Instructions for use Diagnosis    ascorbic acid 1000 MG Tabs tablet      Take 1 tablet by mouth 3 times daily        aspirin 81 MG tablet      Take 1 tablet by mouth daily.        atorvastatin 40 MG tablet    LIPITOR    90 tablet    Take 1 tablet (40 mg) by mouth daily    Hyperlipidemia LDL goal <100       CALCIUM CITRATE + PO      Take 2 tablets by mouth daily        cetirizine 10 MG tablet    zyrTEC     Take 10 mg by mouth daily        cyanocobalamin 500 MCG Tabs      Take 1 tablet by mouth 2 times daily        cyclobenzaprine 10 MG tablet    FLEXERIL    60 tablet    Take 1 tablet (10 mg) by mouth 2 times daily as needed for muscle spasms    S/P lumbar fusion       ferrous gluconate 324 (38 FE) MG tablet    FERGON    270 tablet    Take 1 tablet (324 mg) by mouth 3 times daily (with meals)    Low iron       FOLIC ACID PO      Take 800 mcg by mouth daily        magnesium oxide 400 (241.3 MG) MG tablet    MAG-OX     Take 1 tablet (400 mg) by mouth daily        methotrexate 2.5 MG tablet     108 tablet    Take 9 tablets (22.5 mg) by mouth once a week    Rheumatoid arthritis involving multiple sites with positive rheumatoid factor (H)       Multi-vitamin Tabs tablet      Take 1 tablet by mouth daily        OMEGA-3 FISH OIL PO      Take 2,000 mg by mouth daily        order for [facility]      [facility]  60 Series Auto CPAP 12-15 cm H2O, Wisp nasal mask w/a s/m cushion        pantoprazole 40 MG EC tablet    PROTONIX    90 tablet    TAKE ONE TABLET BY MOUTH EVERY DAY 30-60 MINUTES BEFORE A MEAL    Gastroesophageal reflux disease without esophagitis       PREDNISONE PO      Take 20 mg by mouth as needed        PROBIOTIC DAILY PO           propylene glycol 0.6 % Soln ophthalmic solution    SYSTANE BALANCE     Place 1 drop into both eyes 4 times daily Reported on 5/10/2017        tofacitinib 11 MG 24 hr tablet    XELJANZ XR    90 tablet    Take 1 tablet (11 mg) by mouth daily    Rheumatoid arthritis involving multiple sites with positive rheumatoid factor (H)       topiramate 25 MG tablet    TOPAMAX    360 tablet    Take 2 tablets (50 mg) by mouth 2 times daily    Non morbid obesity due to excess calories       triamcinolone 0.1 % cream    KENALOG    30 g    Apply sparingly to affected area three times daily for 14 days.    Rash       TYLENOL 500 MG tablet   Generic drug:  acetaminophen     100 tablet    Take 2 tablets (1,000 mg) by mouth every 8 hours as needed for pain        vitamin B complex with vitamin C Tabs tablet      Take 1 tablet by mouth daily.        VITAMIN D-3 PO      Take 1,000 Units by mouth 2 times daily        VITAMIN E PO      Take 2,000 Units by mouth daily        ZOLOFT 25 MG tablet   Generic drug:  sertraline      Take 25 mg by mouth daily

## 2018-02-22 NOTE — PATIENT INSTRUCTIONS
If the rash worsens, develops blisters, or it appears more like shingles, call the clinic and we can discuss starting you on an anti-viral

## 2018-02-23 ASSESSMENT — ANXIETY QUESTIONNAIRES: GAD7 TOTAL SCORE: 0

## 2018-02-23 ASSESSMENT — PATIENT HEALTH QUESTIONNAIRE - PHQ9: SUM OF ALL RESPONSES TO PHQ QUESTIONS 1-9: 5

## 2018-03-02 ENCOUNTER — TELEPHONE (OUTPATIENT)
Dept: RHEUMATOLOGY | Facility: CLINIC | Age: 78
End: 2018-03-02

## 2018-03-02 NOTE — TELEPHONE ENCOUNTER
Prior Authorization Specialty Medication Request    Medication/Dose: Xeljanz  Diagnosis and ICD: Rheumatoid arthritis involving multiple sites with positive rheumatoid factor (H) [M05.79]    New/Renewal/Insurance Change PA:     Important Lab Values:     Previously Tried and Failed Therapies:     Rationale:     Would you like to include any research articles?    If yes please include the hyperlink(s) below or fax @ [phone].    (Include Name and MRN)    If you received a fax notification from an outside Pharmacy;  Pharmacy Name: [facility]  Pharmacy #: [phone]  Pharmacy Fax: [phone]    Samantha Morales CMA  3/2/2018 10:23 AM

## 2018-03-06 DIAGNOSIS — E78.5 HYPERLIPIDEMIA LDL GOAL <100: Chronic | ICD-10-CM

## 2018-03-06 DIAGNOSIS — K21.9 GASTROESOPHAGEAL REFLUX DISEASE WITHOUT ESOPHAGITIS: ICD-10-CM

## 2018-03-07 DIAGNOSIS — M05.79 RHEUMATOID ARTHRITIS INVOLVING MULTIPLE SITES WITH POSITIVE RHEUMATOID FACTOR (H): ICD-10-CM

## 2018-03-07 RX ORDER — METHOTREXATE SODIUM 2.5 MG/1
22.5 TABLET ORAL WEEKLY
Qty: 108 TABLET | Refills: 2 | Status: SHIPPED | OUTPATIENT
Start: 2018-03-07 | End: 2018-08-08

## 2018-03-07 RX ORDER — ATORVASTATIN CALCIUM 40 MG/1
40 TABLET, FILM COATED ORAL DAILY
Qty: 90 TABLET | Refills: 2 | Status: SHIPPED | OUTPATIENT
Start: 2018-03-07 | End: 2018-03-09

## 2018-03-07 RX ORDER — PANTOPRAZOLE SODIUM 40 MG/1
TABLET, DELAYED RELEASE ORAL
Qty: 90 TABLET | Refills: 2 | Status: SHIPPED | OUTPATIENT
Start: 2018-03-07 | End: 2018-03-09

## 2018-03-07 NOTE — TELEPHONE ENCOUNTER
PA Initiation    Medication: Xeljanz XR 11 MG   Insurance Company: [employer] - Phone [phone] Fax [phone]  Pharmacy Filling the Rx: [facility] - [place] [address] [address]  Filling Pharmacy Phone: [phone]  Filling Pharmacy Fax: [phone]  Start Date: 3/7/2018    Central Prior Authorization Team   Phone: [phone]

## 2018-03-07 NOTE — TELEPHONE ENCOUNTER
PRIOR AUTHORIZATION DENIED    Medication: Xeljanz XR 11 MG DENIED    Denial Date: 3/7/2018    Denial Rational: Denied as patient is to have tried and failed Humira in the past 12 months:          Appeal Information:

## 2018-03-09 ENCOUNTER — TELEPHONE (OUTPATIENT)
Dept: INTERNAL MEDICINE | Facility: CLINIC | Age: 78
End: 2018-03-09

## 2018-03-09 DIAGNOSIS — K21.9 GASTROESOPHAGEAL REFLUX DISEASE WITHOUT ESOPHAGITIS: ICD-10-CM

## 2018-03-09 DIAGNOSIS — E78.5 HYPERLIPIDEMIA LDL GOAL <100: Chronic | ICD-10-CM

## 2018-03-09 RX ORDER — ATORVASTATIN CALCIUM 40 MG/1
40 TABLET, FILM COATED ORAL DAILY
Qty: 90 TABLET | Refills: 2 | Status: SHIPPED | OUTPATIENT
Start: 2018-03-09 | End: 2019-10-16

## 2018-03-09 RX ORDER — PANTOPRAZOLE SODIUM 40 MG/1
TABLET, DELAYED RELEASE ORAL
Qty: 90 TABLET | Refills: 2 | Status: SHIPPED | OUTPATIENT
Start: 2018-03-09 | End: 2019-08-15

## 2018-03-14 NOTE — TELEPHONE ENCOUNTER
MEDICATION APPEAL APPROVED    Medication: Xeljanz XR 11 MG DENIED; APPEAL APPROVED  Authorization Effective Date: 3/14/2018  Authorization Expiration Date: 3/14/2020  Approved Dose/Quantity: 90/90 days  Reference #: [id number]   Insurance Company: [employer] [phone] Fax [phone]  Expected CoPay: $8.35    Which Pharmacy is filling the prescription (Not needed for infusion/clinic administered): [facility] - [address]

## 2018-03-15 ENCOUNTER — TELEPHONE (OUTPATIENT)
Dept: NEUROSURGERY | Facility: CLINIC | Age: 78
End: 2018-03-15

## 2018-03-15 ENCOUNTER — RADIANT APPOINTMENT (OUTPATIENT)
Dept: MRI IMAGING | Facility: CLINIC | Age: 78
End: 2018-03-15
Attending: NURSE PRACTITIONER
Payer: COMMERCIAL

## 2018-03-15 DIAGNOSIS — Z98.1 S/P LUMBAR FUSION: ICD-10-CM

## 2018-03-15 DIAGNOSIS — M54.16 LUMBAR RADICULOPATHY: ICD-10-CM

## 2018-03-15 DIAGNOSIS — M51.369 DEGENERATIVE DISC DISEASE, LUMBAR: ICD-10-CM

## 2018-03-15 PROCEDURE — 82565 ASSAY OF CREATININE: CPT

## 2018-03-15 PROCEDURE — A9585 GADOBUTROL INJECTION: HCPCS

## 2018-03-15 PROCEDURE — 72158 MRI LUMBAR SPINE W/O & W/DYE: CPT | Mod: TC

## 2018-03-15 RX ORDER — GADOBUTROL 604.72 MG/ML
10 INJECTION INTRAVENOUS ONCE
Status: COMPLETED | OUTPATIENT
Start: 2018-03-15 | End: 2018-03-15

## 2018-03-15 RX ADMIN — GADOBUTROL 8.5 ML: 604.72 INJECTION INTRAVENOUS at 11:28

## 2018-03-15 NOTE — TELEPHONE ENCOUNTER
MRI shows fusion intact. Moderate stenosis on the right and mild on the left at L5-S1. Her pain is only on the left. She is not open to injections at this time.  She is also not open to steroids or Neurontin. She states that she will just see if it gets it better.

## 2018-03-21 LAB
CREAT BLD-MCNC: 0.7 MG/DL (ref 0.5–1.2)
GFR SERPL CREATININE-BSD FRML MDRD: 82 ML/MIN/{1.73_M2}
GFRB: 85

## 2018-04-05 ENCOUNTER — OFFICE VISIT (OUTPATIENT)
Dept: OPHTHALMOLOGY | Facility: CLINIC | Age: 78
End: 2018-04-05
Payer: COMMERCIAL

## 2018-04-05 DIAGNOSIS — H00.12 CHALAZION OF RIGHT LOWER EYELID: Primary | ICD-10-CM

## 2018-04-05 PROCEDURE — 92012 INTRM OPH EXAM EST PATIENT: CPT | Performed by: OPHTHALMOLOGY

## 2018-04-05 RX ORDER — CEPHALEXIN 500 MG/1
500 CAPSULE ORAL 3 TIMES DAILY
Qty: 30 CAPSULE | Refills: 1 | Status: SHIPPED | OUTPATIENT
Start: 2018-04-05 | End: 2018-04-15

## 2018-04-05 ASSESSMENT — VISUAL ACUITY
CORRECTION_TYPE: GLASSES
OS_CC: 20/25
OD_CC: 20/20
METHOD: SNELLEN - LINEAR

## 2018-04-05 ASSESSMENT — EXTERNAL EXAM - LEFT EYE: OS_EXAM: NORMAL

## 2018-04-05 ASSESSMENT — EXTERNAL EXAM - RIGHT EYE: OD_EXAM: NORMAL

## 2018-04-05 NOTE — MR AVS SNAPSHOT
After Visit Summary   4/5/2018    Ginger Marshall    MRN: [id number]           Patient Information     Date Of Birth          1940        Visit Information        Provider Department      4/5/2018 1:45 PM Johnny Calzada MD [facility]        Today's Diagnoses     MGD (meibomian gland dysfunction)    -  1    Squamous blepharitis of upper and lower eyelids of both eyes        Chalazion of right lower eyelid          Care Instructions    Warm packs three times daily   Keflex: 500 mg one capsule three times daily.  Return visit as needed.    Johnny Calzada M.D.            Follow-ups after your visit        Your next 10 appointments already scheduled     May 07, 2018 10:30 AM CDT   LAB with FZ LAB   [facility]    [address]  [place] [address]   [phone]           Please do not eat 10-12 hours before your appointment if you are coming in fasting for labs on lipids, cholesterol, or glucose (sugar). This does not apply to pregnant women. Water, hot tea and black coffee (with nothing added) are okay. Do not drink other fluids, diet soda or chew gum.            Aug 06, 2018 11:00 AM CDT   LAB with FZ LAB   [facility])    [address] [place] [place] [address] [address]   [phone]           Please do not eat 10-12 hours before your appointment if you are coming in fasting for labs on lipids, cholesterol, or glucose (sugar). This does not apply to pregnant women. Water, hot tea and black coffee (with nothing added) are okay. Do not drink other fluids, diet soda or chew gum.            Aug 08, 2018 11:00 AM CDT   Return Visit with Nico Byers MD   [facility]    [address] [address] [phone]              Who to contact     If you have questions or need follow up information about today's clinic visit or your schedule please  contact [facility] directly at [phone].  Normal or non-critical lab and imaging results will be communicated to you by MyChart, letter or phone within 4 business days after the clinic has received the results. If you do not hear from us within 7 days, please contact the clinic through [employer] or phone. If you have a critical or abnormal lab result, we will notify you by phone as soon as possible.  Submit refill requests through [email or web address] or call your pharmacy and they will forward the refill request to us. Please allow 3 business days for your refill to be completed.          Additional Information About Your Visit        [employer] Information     [email or web address] gives you secure access to your electronic health record. If you see a primary care provider, you can also send messages to your care team and make appointments. If you have questions, please call your primary care clinic.  If you do not have a primary care provider, please call [phone] and they will assist you.        Care EveryWhere ID     This is your Care EveryWhere ID. This could be used by other organizations to access your [facility] medical records  [id number]         Blood Pressure from Last 3 Encounters:   02/22/18 106/69   02/20/18 151/85   02/07/18 125/78    Weight from Last 3 Encounters:   02/22/18 83.9 kg (185 lb)   02/07/18 84.6 kg (186 lb 8 oz)   01/17/18 83.6 kg (184 lb 3.2 oz)              Today, you had the following     No orders found for display         Today's Medication Changes          These changes are accurate as of 4/5/18  2:17 PM.  If you have any questions, ask your nurse or doctor.               Start taking these medicines.        Dose/Directions    cephALEXin 500 MG capsule   Commonly known as:  KEFLEX   Used for:  Chalazion of right lower eyelid   Started by:  Johnny Calzada MD        Dose:  500 mg   Take 1 capsule (500 mg) by mouth 3 times daily for 10 days   Quantity:  30 capsule   Refills:  1             Where to get your medicines      These medications were sent to [facility] - [place], [place] - [address]  [address], [place] [address]     Phone:  [phone]     cephALEXin 500 MG capsule                Primary Care Provider Office Phone # Fax #    Georgia Ju Vázquez [phone] [address]  [place] [address]        Equal Access to Services     [facility] : Hadii aad ku hadasho Soomaali, waaxda luqadaha, qaybta kaalmada adeegyada, waxay idiin hayaan adeeg kharash la'aan . So [facility] [phone].    ATENCIÓN: Si habla español, tiene a herrera disposición servicios gratuitos de asistencia lingüística. [facility] [phone].    We comply with applicable federal civil rights laws and [place] laws. We do not discriminate on the basis of race, color, national origin, age, disability, sex, sexual orientation, or gender identity.            Thank you!     Thank you for choosing [facility]  for your care. Our goal is always to provide you with excellent care. Hearing back from our patients is one way we can continue to improve our services. Please take a few minutes to complete the written survey that you may receive in the mail after your visit with us. Thank you!             Your Updated Medication List - Protect others around you: Learn how to safely use, store and throw away your medicines at www.disposemymeds.org.          This list is accurate as of 4/5/18  2:17 PM.  Always use your most recent med list.                   Brand Name Dispense Instructions for use Diagnosis    ascorbic acid 1000 MG Tabs tablet      Take 1 tablet by mouth 3 times daily        aspirin 81 MG tablet      Take 1 tablet by mouth daily.        atorvastatin 40 MG tablet    LIPITOR    90 tablet    Take 1 tablet (40 mg) by mouth daily    Hyperlipidemia LDL goal <100       CALCIUM CITRATE + PO      Take 2 tablets by mouth daily        cephALEXin 500 MG  capsule    KEFLEX    30 capsule    Take 1 capsule (500 mg) by mouth 3 times daily for 10 days    Chalazion of right lower eyelid       cetirizine 10 MG tablet    zyrTEC     Take 10 mg by mouth daily        cyanocobalamin 500 MCG Tabs      Take 1 tablet by mouth 2 times daily        cyclobenzaprine 10 MG tablet    FLEXERIL    60 tablet    Take 1 tablet (10 mg) by mouth 2 times daily as needed for muscle spasms    S/P lumbar fusion       ferrous gluconate 324 (38 FE) MG tablet    FERGON    270 tablet    Take 1 tablet (324 mg) by mouth 3 times daily (with meals)    Low iron       FOLIC ACID PO      Take 800 mcg by mouth daily        magnesium oxide 400 (241.3 MG) MG tablet    MAG-OX     Take 1 tablet (400 mg) by mouth daily        methotrexate 2.5 MG tablet     108 tablet    Take 9 tablets (22.5 mg) by mouth once a week    Rheumatoid arthritis involving multiple sites with positive rheumatoid factor (H)       Multi-vitamin Tabs tablet      Take 1 tablet by mouth daily        OMEGA-3 FISH OIL PO      Take 2,000 mg by mouth daily        order for [facility]      [employer] REMSTAR 60 Series Auto CPAP 12-15 cm H2O, Wisp nasal mask w/a s/m cushion        pantoprazole 40 MG EC tablet    PROTONIX    90 tablet    TAKE ONE TABLET BY MOUTH EVERY DAY 30-60 MINUTES BEFORE A MEAL    Gastroesophageal reflux disease without esophagitis       PREDNISONE PO      Take 20 mg by mouth as needed        PROBIOTIC DAILY PO           propylene glycol 0.6 % Soln ophthalmic solution    SYSTANE BALANCE     Place 1 drop into both eyes 4 times daily Reported on 5/10/2017        tofacitinib 11 MG 24 hr tablet    XELJANZ XR    90 tablet    Take 1 tablet (11 mg) by mouth daily    Rheumatoid arthritis involving multiple sites with positive rheumatoid factor (H)       topiramate 25 MG tablet    TOPAMAX    360 tablet    Take 2 tablets (50 mg) by mouth 2 times daily    Non morbid obesity due to excess calories       triamcinolone 0.1 % cream    KENALOG    30  g    Apply sparingly to affected area three times daily for 14 days.    Rash       TYLENOL 500 MG tablet   Generic drug:  acetaminophen     100 tablet    Take 2 tablets (1,000 mg) by mouth every 8 hours as needed for pain        vitamin B complex with vitamin C Tabs tablet      Take 1 tablet by mouth daily.        VITAMIN D-3 PO      Take 1,000 Units by mouth 2 times daily        VITAMIN E PO      Take 2,000 Units by mouth daily        ZOLOFT 25 MG tablet   Generic drug:  sertraline      Take 25 mg by mouth daily

## 2018-04-05 NOTE — PATIENT INSTRUCTIONS
Warm packs three times daily.   Keflex: 500 mg one capsule three times daily.  Return visit as needed.    Johnny Calzada M.D.

## 2018-04-05 NOTE — LETTER
4/5/2018         RE: Ginger Marshall  [address]   [place] [address]        Dear Colleague,    Thank you for referring your patient, Ginger Marshall, to the [facility]. Please see a copy of my visit note below.     Current Eye Medications:  Systane prn     Subjective:  Pt reports that yesterday  her right lower lid became red, inflamed and sore. Pt reports avoids using systane because they make her eyes feel worse.     Objective:  See Ophthalmology Exam.       Assessment:  Acute chalazion right lower lid.      Plan:  Warm packs three times daily.   Keflex: 500 mg one capsule three times daily.  Return visit as needed.    Johnny Calzada M.D.        Again, thank you for allowing me to participate in the care of your patient.        Sincerely,        Johnny Calzada MD

## 2018-05-07 DIAGNOSIS — Z79.899 HIGH RISK MEDICATION USE: ICD-10-CM

## 2018-05-07 DIAGNOSIS — M05.79 RHEUMATOID ARTHRITIS INVOLVING MULTIPLE SITES WITH POSITIVE RHEUMATOID FACTOR (H): ICD-10-CM

## 2018-05-07 LAB
ALBUMIN SERPL-MCNC: 3.7 G/DL (ref 3.4–5)
ALP SERPL-CCNC: 49 U/L (ref 40–150)
ALT SERPL W P-5'-P-CCNC: 32 U/L (ref 0–50)
AST SERPL W P-5'-P-CCNC: 28 U/L (ref 0–45)
BASOPHILS # BLD AUTO: 0 10E9/L (ref 0–0.2)
BASOPHILS NFR BLD AUTO: 0.2 %
BILIRUB DIRECT SERPL-MCNC: <0.1 MG/DL (ref 0–0.2)
BILIRUB SERPL-MCNC: 0.3 MG/DL (ref 0.2–1.3)
CREAT SERPL-MCNC: 0.64 MG/DL (ref 0.52–1.04)
DIFFERENTIAL METHOD BLD: NORMAL
EOSINOPHIL # BLD AUTO: 0.2 10E9/L (ref 0–0.7)
EOSINOPHIL NFR BLD AUTO: 3.8 %
ERYTHROCYTE [DISTWIDTH] IN BLOOD BY AUTOMATED COUNT: 14.6 % (ref 10–15)
GFR SERPL CREATININE-BSD FRML MDRD: 90 ML/MIN/1.7M2
HCT VFR BLD AUTO: 38.9 % (ref 35–47)
HGB BLD-MCNC: 12.7 G/DL (ref 11.7–15.7)
LYMPHOCYTES # BLD AUTO: 1.5 10E9/L (ref 0.8–5.3)
LYMPHOCYTES NFR BLD AUTO: 23.8 %
MCH RBC QN AUTO: 31.6 PG (ref 26.5–33)
MCHC RBC AUTO-ENTMCNC: 32.6 G/DL (ref 31.5–36.5)
MCV RBC AUTO: 97 FL (ref 78–100)
MONOCYTES # BLD AUTO: 0.7 10E9/L (ref 0–1.3)
MONOCYTES NFR BLD AUTO: 10.9 %
NEUTROPHILS # BLD AUTO: 3.8 10E9/L (ref 1.6–8.3)
NEUTROPHILS NFR BLD AUTO: 61.3 %
PLATELET # BLD AUTO: 364 10E9/L (ref 150–450)
PROT SERPL-MCNC: 7.9 G/DL (ref 6.8–8.8)
RBC # BLD AUTO: 4.02 10E12/L (ref 3.8–5.2)
WBC # BLD AUTO: 6.3 10E9/L (ref 4–11)

## 2018-05-07 PROCEDURE — 85025 COMPLETE CBC W/AUTO DIFF WBC: CPT | Performed by: INTERNAL MEDICINE

## 2018-05-07 PROCEDURE — 86480 TB TEST CELL IMMUN MEASURE: CPT | Performed by: INTERNAL MEDICINE

## 2018-05-07 PROCEDURE — 36415 COLL VENOUS BLD VENIPUNCTURE: CPT | Performed by: INTERNAL MEDICINE

## 2018-05-07 PROCEDURE — 82565 ASSAY OF CREATININE: CPT | Performed by: INTERNAL MEDICINE

## 2018-05-07 PROCEDURE — 80076 HEPATIC FUNCTION PANEL: CPT | Performed by: INTERNAL MEDICINE

## 2018-05-08 LAB
M TB TUBERC IFN-G BLD QL: NEGATIVE
M TB TUBERC IFN-G/MITOGEN IGNF BLD: 0 IU/ML

## 2018-05-09 ENCOUNTER — MYC MEDICAL ADVICE (OUTPATIENT)
Dept: RHEUMATOLOGY | Facility: CLINIC | Age: 78
End: 2018-05-09

## 2018-06-11 ENCOUNTER — MYC MEDICAL ADVICE (OUTPATIENT)
Dept: INTERNAL MEDICINE | Facility: CLINIC | Age: 78
End: 2018-06-11

## 2018-06-11 NOTE — TELEPHONE ENCOUNTER
Dr. Vázquez,  Please see [employer] message below and advise.    Margaux Castillo RN  [facility]

## 2018-06-12 ENCOUNTER — MYC MEDICAL ADVICE (OUTPATIENT)
Dept: INTERNAL MEDICINE | Facility: CLINIC | Age: 78
End: 2018-06-12

## 2018-06-12 DIAGNOSIS — Z12.11 SPECIAL SCREENING FOR MALIGNANT NEOPLASMS, COLON: Primary | ICD-10-CM

## 2018-06-12 NOTE — TELEPHONE ENCOUNTER
Called patient and made appointment for tomorrow to get fit test from lab.       Melinda SHULTZ CMA ([facility])

## 2018-06-14 ENCOUNTER — HOSPITAL ENCOUNTER (OUTPATIENT)
Facility: CLINIC | Age: 78
Setting detail: SPECIMEN
Discharge: HOME | End: 2018-06-14
Admitting: INTERNAL MEDICINE
Payer: COMMERCIAL

## 2018-06-14 PROCEDURE — 82274 ASSAY TEST FOR BLOOD FECAL: CPT | Performed by: INTERNAL MEDICINE

## 2018-06-17 DIAGNOSIS — Z12.11 SPECIAL SCREENING FOR MALIGNANT NEOPLASMS, COLON: ICD-10-CM

## 2018-06-17 LAB — HEMOCCULT STL QL IA: NEGATIVE

## 2018-06-22 ENCOUNTER — OFFICE VISIT (OUTPATIENT)
Dept: INTERNAL MEDICINE | Facility: CLINIC | Age: 78
End: 2018-06-22
Payer: COMMERCIAL

## 2018-06-22 VITALS
SYSTOLIC BLOOD PRESSURE: 120 MMHG | OXYGEN SATURATION: 95 % | TEMPERATURE: 97.6 F | WEIGHT: 185 LBS | DIASTOLIC BLOOD PRESSURE: 76 MMHG | HEART RATE: 95 BPM | RESPIRATION RATE: 20 BRPM | BODY MASS INDEX: 28.55 KG/M2

## 2018-06-22 DIAGNOSIS — E61.1 LOW IRON: ICD-10-CM

## 2018-06-22 DIAGNOSIS — M05.79 RHEUMATOID ARTHRITIS INVOLVING MULTIPLE SITES WITH POSITIVE RHEUMATOID FACTOR (H): Chronic | ICD-10-CM

## 2018-06-22 DIAGNOSIS — Z86.59 HISTORY OF DEPRESSION: ICD-10-CM

## 2018-06-22 DIAGNOSIS — K21.9 GASTROESOPHAGEAL REFLUX DISEASE WITHOUT ESOPHAGITIS: Chronic | ICD-10-CM

## 2018-06-22 PROCEDURE — 99214 OFFICE O/P EST MOD 30 MIN: CPT | Performed by: INTERNAL MEDICINE

## 2018-06-22 RX ORDER — FERROUS GLUCONATE 324(38)MG
324 TABLET ORAL
Qty: 270 TABLET | Refills: 1 | COMMUNITY
Start: 2018-06-22 | End: 2019-06-05

## 2018-06-22 NOTE — MR AVS SNAPSHOT
After Visit Summary   6/22/2018    Ginger Marshall    MRN: [id number]           Patient Information     Date Of Birth          1940        Visit Information        Provider Department      6/22/2018 11:45 AM Georgia Vázquez MD [facility]        Today's Diagnoses     BMI 28.0-28.9,adult    -  1    Low iron        Gastroesophageal reflux disease without esophagitis        Multinodular goiter        Rheumatoid arthritis involving multiple sites with positive rheumatoid factor (H)        History of depression          Care Instructions    Look into Sit and Be Fit videos.    Stop Zyrtec.    Try generic Flonase or Nasicort for allergies.    Start food logging again. Mail me a few days worth to look at.       [facility]    If you have any questions regarding to your visit please contact your care team:     Team Pink:   Clinic Hours Telephone Number   Internal Medicine:  Dr. Georgia Flores, NP       7am-7pm  Monday - Thursday   7am-5pm  Fridays  [phone]  (Appointment scheduling available 24/7)    Questions about your recent visit?  Team Line  [phone]   Urgent Care - [place] - 11am-9pm Monday-Friday Saturday-Sunday- 9am-5pm   [place] - 5pm-9pm Monday-Friday Saturday-Sunday- 9am-5pm  [phone] - [place]  [phone] - [place]       What options do I have for a visit other than an office visit? We offer electronic visits (e-visits) and telephone visits, when medically appropriate.  Please check with your medical insurance to see if these types of visits are covered, as you will be responsible for any charges that are not paid by your insurance.      You can use Sendah Direct (secure electronic communication) to access to your chart, send your primary care provider a message, or make an appointment. Ask a team member how to get started.     For a price quote for your services, please call our  Consumer Price Line at [phone] or our Imaging Cost estimation line at [phone] (for imaging tests).      Estephania Ibarra CMA          Follow-ups after your visit        Follow-up notes from your care team     Return in about 6 months (around 12/22/2018).      Your next 10 appointments already scheduled     Jun 25, 2018 11:00 AM CDT   (Arrive by 10:45 AM)   US THYROID with FKUS1   [facility] ([facility] [place])    [address]  [place] [address] [phone]           Please bring a list of your medicines (including vitamins, minerals and over-the-counter drugs). Also, tell your doctor about any allergies you may have. Wear comfortable clothes and leave your valuables at home.  You do not need to do anything special to prepare for your exam.  Please call the Imaging Department at your exam site with any questions.            Jul 19, 2018 10:00 AM CDT   New Visit with Aguila San MD   [facility] [place] ([facility] [place])    [address]  [place] [address] [phone]            Aug 06, 2018 11:00 AM CDT   LAB with FZ LAB   [place] [facility] ([facility] [place])    [address] [address] [phone]           Please do not eat 10-12 hours before your appointment if you are coming in fasting for labs on lipids, cholesterol, or glucose (sugar). This does not apply to pregnant women. Water, hot tea and black coffee (with nothing added) are okay. Do not drink other fluids, diet soda or chew gum.            Aug 08, 2018 11:00 AM CDT   Return Visit with Nico Byers MD   [facility] [place] ([facility]    [address] [address] [phone]              Who to contact     If you have questions or need follow up information about today's clinic visit or your schedule please contact [facility] directly at [phone].  Normal or non-critical lab  and imaging results will be communicated to you by [employer], letter or phone within 4 business days after the clinic has received the results. If you do not hear from us within 7 days, please contact the clinic through [employer]gEvolve or phone. If you have a critical or abnormal lab result, we will notify you by phone as soon as possible.  Submit refill requests through AlgEvolve or call your pharmacy and they will forward the refill request to us. Please allow 3 business days for your refill to be completed.          Additional Information About Your Visit        [employer] Information     AlgEvolve gives you secure access to your electronic health record. If you see a primary care provider, you can also send messages to your care team and make appointments. If you have questions, please call your primary care clinic.  If you do not have a primary care provider, please call [phone] and they will assist you.        Care EveryWhere ID     This is your Care EveryWhere ID. This could be used by other organizations to access your [place] medical records  [id number]        Your Vitals Were     Pulse Temperature Respirations Pulse Oximetry Breastfeeding? BMI (Body Mass Index)    95 97.6  F (36.4  C) (Oral) 20 95% No 28.55 kg/m2       Blood Pressure from Last 3 Encounters:   06/22/18 120/76   02/22/18 106/69   02/20/18 151/85    Weight from Last 3 Encounters:   06/22/18 185 lb (83.9 kg)   02/22/18 185 lb (83.9 kg)   02/07/18 186 lb 8 oz (84.6 kg)              Today, you had the following     No orders found for display         Today's Medication Changes          These changes are accurate as of 6/22/18 12:26 PM.  If you have any questions, ask your nurse or doctor.               These medicines have changed or have updated prescriptions.        Dose/Directions    ferrous gluconate 324 (38 Fe) MG tablet   Commonly known as:  FERGON   This may have changed:  when to take this   Used for:  Low iron   Changed by:  Georgia Vázquez MD         Dose:  324 mg   Take 1 tablet (324 mg) by mouth daily (with breakfast)   Quantity:  270 tablet   Refills:  1         Stop taking these medicines if you haven't already. Please contact your care team if you have questions.     cetirizine 10 MG tablet   Commonly known as:  zyrTEC   Stopped by:  Georgia Vázquez MD                    Primary Care Provider Office Phone # Fax #    Georgia Vázquez [phone] [address] [facility] [address]        Equal Access to Services     [facility]: Hadii aad ku hadasho Soomaali, waaxda luqadaha, qaybta kaalmada adeegyada, waxay idiin hayaan raheem hanks . So [facility] [phone].    ATENCIÓN: Si habla español, tiene a herrera disposición servicios gratuitos de asistencia lingüística. [facility] [phone].    We comply with applicable federal civil rights laws and [place] laws. We do not discriminate on the basis of race, color, national origin, age, disability, sex, sexual orientation, or gender identity.            Thank you!     Thank you for choosing [facility]  for your care. Our goal is always to provide you with excellent care. Hearing back from our patients is one way we can continue to improve our services. Please take a few minutes to complete the written survey that you may receive in the mail after your visit with us. Thank you!             Your Updated Medication List - Protect others around you: Learn how to safely use, store and throw away your medicines at www.disposemymeds.org.          This list is accurate as of 6/22/18 12:26 PM.  Always use your most recent med list.                   Brand Name Dispense Instructions for use Diagnosis    ascorbic acid 1000 MG Tabs tablet      Take 1 tablet by mouth 3 times daily        aspirin 81 MG tablet      Take 1 tablet by mouth daily.        atorvastatin 40 MG tablet    LIPITOR    90 tablet    Take 1 tablet (40 mg) by mouth daily    Hyperlipidemia LDL goal <100        CALCIUM CITRATE + PO      Take 2 tablets by mouth daily        cyanocobalamin 500 MCG Tabs      Take 1 tablet by mouth 2 times daily        cyclobenzaprine 10 MG tablet    FLEXERIL    60 tablet    Take 1 tablet (10 mg) by mouth 2 times daily as needed for muscle spasms    S/P lumbar fusion       ferrous gluconate 324 (38 Fe) MG tablet    FERGON    270 tablet    Take 1 tablet (324 mg) by mouth daily (with breakfast)    Low iron       FOLIC ACID PO      Take 800 mcg by mouth daily        magnesium oxide 400 (241.3 Mg) MG tablet    MAG-OX     Take 1 tablet (400 mg) by mouth daily        methotrexate 2.5 MG tablet     108 tablet    Take 9 tablets (22.5 mg) by mouth once a week    Rheumatoid arthritis involving multiple sites with positive rheumatoid factor (H)       Multi-vitamin Tabs tablet      Take 1 tablet by mouth daily        OMEGA-3 FISH OIL PO      Take 2,000 mg by mouth daily        order for [facility]      Respironics REMSTAR 60 Series Auto CPAP 12-15 cm H2O, Wisp nasal mask w/a s/m cushion        pantoprazole 40 MG EC tablet    PROTONIX    90 tablet    TAKE ONE TABLET BY MOUTH EVERY DAY 30-60 MINUTES BEFORE A MEAL    Gastroesophageal reflux disease without esophagitis       PREDNISONE PO      Take 20 mg by mouth as needed        tofacitinib 11 MG 24 hr tablet    XELJANZ XR    90 tablet    Take 1 tablet (11 mg) by mouth daily    Rheumatoid arthritis involving multiple sites with positive rheumatoid factor (H)       TYLENOL 500 MG tablet   Generic drug:  acetaminophen     100 tablet    Take 2 tablets (1,000 mg) by mouth every 8 hours as needed for pain        vitamin B complex with vitamin C Tabs tablet      Take 1 tablet by mouth daily.        VITAMIN D-3 PO      Take 1,000 Units by mouth 2 times daily        VITAMIN E PO      Take 2,000 Units by mouth daily

## 2018-06-22 NOTE — PROGRESS NOTES
INTERNAL MEDICINE  Medical Weight Loss - Follow-up Visit    ASSESSMENT/PLAN:    1. Low iron    - ferrous gluconate (FERGON) 324 (38 Fe) MG tablet; Take 1 tablet (324 mg) by mouth daily (with breakfast)  Dispense: 270 tablet; Refill: 1    2. BMI 28.0-28.9,adult  Stable.  Per patient instructions. Off topamax and weight is holding stable .  Stop zyrtec as this can cause weight gain.    3. Gastroesophageal reflux disease without esophagitis  The current medical regimen is effective;  continue present plan and medications.       5. Rheumatoid arthritis involving multiple sites with positive rheumatoid factor (H)  The current medical regimen is effective;  continue present plan and medications.     6. History of depression  No recurrence           Patient Instructions     Look into Sit and Be Fit videos.    Stop Zyrtec.    Try generic Flonase or Nasicort for allergies.    Start food logging again. Mail me a few days worth to look at.       Chief Complaint:   Chief Complaint   Patient presents with     Weight Check     1 year follow-up     Wt Readings from Last 5 Encounters:   06/22/18 83.9 kg (185 lb)   02/22/18 83.9 kg (185 lb)   02/07/18 84.6 kg (186 lb 8 oz)   01/17/18 83.6 kg (184 lb 3.2 oz)   12/14/17 82.9 kg (182 lb 12.8 oz)     HISTORY OF PRESENT ILLNESS (HPI):    Patient  returns today for medical weight loss follow-up visit. Patient was last seen by me on 3/1/17 and has lost 10 pounds.     Update - Not exercising because of back pain. Surgery did help with back pain but is exacerbated with prolonged standing. She uses ice and Tylenol for pain relief.     Diet - She is not food logging regularly. When she goes to the grocery store, she does not crave anything. Eating raw veggies or hard foods make her mouth sore but she does not want to cook either.    Additional notes:  She has taken Zyrtec daily for years  Taking 1 tablet prednisone manages her flares  Depression is controlled      Patient is not seeing  dietitian    Patient is not seeing PT       MEDICATIONS:   Current Outpatient Prescriptions   Medication Sig Dispense Refill     acetaminophen (TYLENOL) 500 MG tablet Take 2 tablets (1,000 mg) by mouth every 8 hours as needed for pain 100 tablet 0     Ascorbic Acid 1000 MG TABS Take 1 tablet by mouth 3 times daily       aspirin 81 MG tablet Take 1 tablet by mouth daily.       atorvastatin (LIPITOR) 40 MG tablet Take 1 tablet (40 mg) by mouth daily 90 tablet 2     Calcium Citrate-Vitamin D (CALCIUM CITRATE + PO) Take 2 tablets by mouth daily       Cholecalciferol (VITAMIN D-3 PO) Take 1,000 Units by mouth 2 times daily       cyanocobalamin 500 MCG TABS Take 1 tablet by mouth 2 times daily       cyclobenzaprine (FLEXERIL) 10 MG tablet Take 1 tablet (10 mg) by mouth 2 times daily as needed for muscle spasms 60 tablet 2     ferrous gluconate (FERGON) 324 (38 Fe) MG tablet Take 1 tablet (324 mg) by mouth daily (with breakfast) 270 tablet 1     FOLIC ACID PO Take 800 mcg by mouth daily       magnesium oxide (MAG-OX) 400 (241.3 MG) MG tablet Take 1 tablet (400 mg) by mouth daily       methotrexate 2.5 MG tablet Take 9 tablets (22.5 mg) by mouth once a week 108 tablet 2     multivitamin, therapeutic with minerals (MULTI-VITAMIN) TABS Take 1 tablet by mouth daily       Omega-3 Fatty Acids (OMEGA-3 FISH OIL PO) Take 2,000 mg by mouth daily        order for DME Respironics REMSTAR 60 Series Auto CPAP 12-15 cm H2O, Wisp nasal mask w/a s/m cushion       pantoprazole (PROTONIX) 40 MG EC tablet TAKE ONE TABLET BY MOUTH EVERY DAY 30-60 MINUTES BEFORE A MEAL 90 tablet 2     PREDNISONE PO Take 20 mg by mouth as needed       tofacitinib (XELJANZ XR) 11 MG 24 hr tablet Take 1 tablet (11 mg) by mouth daily 90 tablet 3     vitamin  B complex with vitamin C (VITAMIN  B COMPLEX) TABS Take 1 tablet by mouth daily.  0     VITAMIN E PO Take 2,000 Units by mouth daily        [DISCONTINUED] methotrexate 2.5 MG tablet Take 10 tablets (25 mg) by  "mouth once a week .  Split dose between AM and PM (5 tabs in the morning, and 5 tabs in the evening; all taken within the same day each week) 120 tablet 1       ALLERGIES:   Allergies   Allergen Reactions     Abatacept      Severe headaches     Adhesive Tape      Plastic tape     Celebrex [Celecoxib]      Ineffective     Ethanol      Antihistamines     Orencia [Abatacept]      Headache     Septra [Bactrim]      Sulfa Drugs      \"deathly ill\"     Tramadol      Headache     This document serves as a record of the services and decisions personally performed and made by Georgia Vázquez MD. It was created on his/her behalf by Angeline Burris, trained medical scribe. The creation of this document is based the provider's statements to the medical scribes.    Scribneil Burris 12:10 PM, June 22, 2018    PHYSICAL EXAMINATION:    VITALS: /76 (BP Location: Right arm, Cuff Size: Adult Regular)  Pulse 95  Temp 97.6  F (36.4  C) (Oral)  Resp 20  Wt 83.9 kg (185 lb)  SpO2 95%  Breastfeeding? No  BMI 28.55 kg/m2  GENERAL: Patient is a 78 year old year old female  in no acute distress.  Patient is alert and orientated x 4, pleasant and cooperative with exam.  NECK: no adenopathy, no asymmetry, masses, or scars and thyroid normal to palpation  CARDIOVASCULAR:  Regular rate and rhythm without murmurs, rubs, or gallops.  RESPIRATORY:  Lungs are clear to auscultation bilaterally, respiratory effort is normal.   LOWER EXTREMITIES:  No edema noted bilaterally  ABDOMEN: soft, nontender, no hepatosplenomegaly, no masses and bowel sounds normal  PSYCH: mentation appears normal, affect normal/bright    LAB RESULTS:   Orders Only on 06/17/2018   Component Date Value Ref Range Status     Occult Blood Scn FIT 06/14/2018 Negative  NEG^Negative Final     ORDERS PLACED IN TODAY'S VISIT:  No orders of the defined types were placed in this encounter.      I spent 15 minutes of time with the patient and >50% of it was in education and counseling " regarding weight management.     The information in this document, created by the medical scribe, Angeline Burris, for me, accurately reflects the services I personally performed and the decisions made by me. I have reviewed and approved this document for accuracy prior to leaving the patient care area.    Georgia Vázquez MD  Internal Medicine    American Board of Obesity Medicine Diplomate     Start 12:10 PM  End 12:25 PM

## 2018-06-22 NOTE — PATIENT INSTRUCTIONS
Look into Sit and Be Fit videos.    Stop Zyrtec.    Try generic Flonase or Nasicort for allergies.    Start food logging again. Mail me a few days worth to look at.       [facility]    If you have any questions regarding to your visit please contact your care team:     Team Pink:   Clinic Hours Telephone Number   Internal Medicine:  Dr. Georgia Flores, NP       7am-7pm  Monday - Thursday   7am-5pm  Fridays  [phone]  (Appointment scheduling available 24/7)    Questions about your recent visit?  Team Line  [phone]   Urgent Care - [place] and [facility] - 11am-9pm Monday-Friday Saturday-Sunday- 9am-5pm   [place] - 5pm-9pm Monday-Friday Saturday-Sunday- 9am-5pm  [phone] - [place]  [phone] - [place]       What options do I have for a visit other than an office visit? We offer electronic visits (e-visits) and telephone visits, when medically appropriate.  Please check with your medical insurance to see if these types of visits are covered, as you will be responsible for any charges that are not paid by your insurance.      You can use Naiku (secure electronic communication) to access to your chart, send your primary care provider a message, or make an appointment. Ask a team member how to get started.     For a price quote for your services, please call our Consumer Price Line at [phone] or our Imaging Cost estimation line at [phone] (for imaging tests).      Estephania Ibarra, CMA

## 2018-06-25 ENCOUNTER — RADIANT APPOINTMENT (OUTPATIENT)
Dept: ULTRASOUND IMAGING | Facility: CLINIC | Age: 78
End: 2018-06-25
Attending: OTOLARYNGOLOGY
Payer: COMMERCIAL

## 2018-06-25 DIAGNOSIS — E04.1 THYROID NODULE: ICD-10-CM

## 2018-06-25 PROCEDURE — 76536 US EXAM OF HEAD AND NECK: CPT

## 2018-06-27 ENCOUNTER — OFFICE VISIT (OUTPATIENT)
Dept: OTOLARYNGOLOGY | Facility: CLINIC | Age: 78
End: 2018-06-27
Payer: COMMERCIAL

## 2018-06-27 VITALS
RESPIRATION RATE: 16 BRPM | DIASTOLIC BLOOD PRESSURE: 76 MMHG | BODY MASS INDEX: 28.49 KG/M2 | OXYGEN SATURATION: 94 % | HEART RATE: 95 BPM | SYSTOLIC BLOOD PRESSURE: 127 MMHG | HEIGHT: 68 IN | WEIGHT: 188 LBS

## 2018-06-27 DIAGNOSIS — J34.89 NASAL SORE: Primary | ICD-10-CM

## 2018-06-27 DIAGNOSIS — J30.0 CHRONIC VASOMOTOR RHINITIS: ICD-10-CM

## 2018-06-27 DIAGNOSIS — R04.0 EPISTAXIS: ICD-10-CM

## 2018-06-27 PROCEDURE — 99214 OFFICE O/P EST MOD 30 MIN: CPT | Mod: 25 | Performed by: OTOLARYNGOLOGY

## 2018-06-27 PROCEDURE — 30901 CONTROL OF NOSEBLEED: CPT | Performed by: OTOLARYNGOLOGY

## 2018-06-27 RX ORDER — IPRATROPIUM BROMIDE 42 UG/1
2 SPRAY, METERED NASAL 4 TIMES DAILY PRN
Qty: 1 BOX | Refills: 3 | Status: SHIPPED | OUTPATIENT
Start: 2018-06-27 | End: 2019-06-05

## 2018-06-27 ASSESSMENT — PAIN SCALES - GENERAL: PAINLEVEL: NO PAIN (0)

## 2018-06-27 NOTE — PATIENT INSTRUCTIONS
General Scheduling Information  To schedule your CT/MRI scan, please contact [place] at [phone]   [address] [place] [address]    To schedule your Surgery, please contact our Specialty Schedulers at [phone]    ENT Clinic Locations Clinic Hours Telephone Number     [facility]  [address] [place] [address]   Tuesday:       8:00am -- 4:00pm    Wednesday:  8:00am - 4:00pm   To schedule an appointment with   Dr. Espinosa,   please contact our   Specialty Scheduling Department at:     [phone]       [facility]  [address] [place] [address]   Friday:          8:00am - 4:00pm         Urgent Care Locations Clinic Hours Telephone Numbers     [facility]  [address] [place] [address]     Monday-Friday:     11:00pm - 9:00pm    Saturday-Sunday:  9:00am - 5:00pm   [phone]     [facility]  [address] [place] [address]     Monday-Friday:      5:00pm - 9:00pm     Saturday-Sunday:  9:00am - 5:00pm   [phone]

## 2018-06-27 NOTE — PROGRESS NOTES
Chief Complaint - nose sore    History of Present Illness - Ginger Marshall is a 78 year old female presents with a lesion in the nose. The patient has noticed for approximately 3 months. Has had some bleeding. It is getting a little better. Had this in the past, worse in the winter, but it usually goes away by now. Some epistaxis with it. It isn't painful. Uses vaseline 1-2 times per day. She has nasal drainage. nonsmoker. No lumps or swollen glands in the neck.     If seen her in the past for thyroid nodules.  I called her this week to notify her that her recent thyroid ultrasound shows stable nodules or even at some have decreased in size.  She notes no new thyroid or neck symptoms.    Past Medical History -   Patient Active Problem List   Diagnosis     RA (Rheumatoid Arthritis) off Plaquenil     Menopause     History of colonic polyps     Mitral Regurgitation     Multinodular goiter     CARDIOVASCULAR SCREENING; LDL GOAL LESS THAN 130     Psychophysiologic insomnia     Pulmonary nodule     PSEUDOPHAKIA OU     PVD (POSTERIOR VITREOUS DETACHMENT) OU     PXF (PSEUDOEXFOLIATION OF LENS CAPSULE) OD     GERD (gastroesophageal reflux disease)     Hyperlipidemia LDL goal <100     Advance Care Planning     Neuropathy     SHERRY (obstructive sleep apnea)-severe (AHI 35)     zEncounter for counseling     Anemia of chronic disease     Migraine     Osteoporosis     Cornea guttata, ou     Conjunctival concretions     Stenosis, spinal, lumbar     Other chronic pain     Obesity     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     MGD (meibomian gland dysfunction)     Blepharitis of both eyes     Personal history of healed osteoporosis fracture     Iron malabsorption     Hyperglycemia     Chronic bilateral low back pain without sciatica     Allergic state, subsequent encounter     BMI 32.0-32.9,adult     Spinal stenosis of lumbar region without neurogenic claudication     Herniated nucleus pulposus, L3-4     S/P lumbar fusion      Anxiety     Low iron     BMI 28.0-28.9,adult     History of depression       Current Medications -   Current Outpatient Prescriptions:      acetaminophen (TYLENOL) 500 MG tablet, Take 2 tablets (1,000 mg) by mouth every 8 hours as needed for pain, Disp: 100 tablet, Rfl: 0     Ascorbic Acid 1000 MG TABS, Take 1 tablet by mouth 3 times daily, Disp: , Rfl:      aspirin 81 MG tablet, Take 1 tablet by mouth daily., Disp: , Rfl:      atorvastatin (LIPITOR) 40 MG tablet, Take 1 tablet (40 mg) by mouth daily, Disp: 90 tablet, Rfl: 2     Calcium Citrate-Vitamin D (CALCIUM CITRATE + PO), Take 2 tablets by mouth daily, Disp: , Rfl:      Cholecalciferol (VITAMIN D-3 PO), Take 1,000 Units by mouth 2 times daily, Disp: , Rfl:      cyanocobalamin 500 MCG TABS, Take 1 tablet by mouth 2 times daily, Disp: , Rfl:      cyclobenzaprine (FLEXERIL) 10 MG tablet, Take 1 tablet (10 mg) by mouth 2 times daily as needed for muscle spasms, Disp: 60 tablet, Rfl: 2     ferrous gluconate (FERGON) 324 (38 Fe) MG tablet, Take 1 tablet (324 mg) by mouth daily (with breakfast), Disp: 270 tablet, Rfl: 1     FOLIC ACID PO, Take 800 mcg by mouth daily, Disp: , Rfl:      magnesium oxide (MAG-OX) 400 (241.3 MG) MG tablet, Take 1 tablet (400 mg) by mouth daily, Disp: , Rfl:      methotrexate 2.5 MG tablet, Take 9 tablets (22.5 mg) by mouth once a week, Disp: 108 tablet, Rfl: 2     multivitamin, therapeutic with minerals (MULTI-VITAMIN) TABS, Take 1 tablet by mouth daily, Disp: , Rfl:      Omega-3 Fatty Acids (OMEGA-3 FISH OIL PO), Take 2,000 mg by mouth daily , Disp: , Rfl:      order for DME, Respironics REMSTAR 60 Series Auto CPAP 12-15 cm H2O, Wisp nasal mask w/a s/m cushion, Disp: , Rfl:      pantoprazole (PROTONIX) 40 MG EC tablet, TAKE ONE TABLET BY MOUTH EVERY DAY 30-60 MINUTES BEFORE A MEAL, Disp: 90 tablet, Rfl: 2     PREDNISONE PO, Take 20 mg by mouth as needed, Disp: , Rfl:      tofacitinib (XELJANZ XR) 11 MG 24 hr tablet, Take 1 tablet (11  "mg) by mouth daily, Disp: 90 tablet, Rfl: 3     vitamin  B complex with vitamin C (VITAMIN  B COMPLEX) TABS, Take 1 tablet by mouth daily., Disp: , Rfl: 0     VITAMIN E PO, Take 2,000 Units by mouth daily , Disp: , Rfl:      [DISCONTINUED] methotrexate 2.5 MG tablet, Take 10 tablets (25 mg) by mouth once a week .  Split dose between AM and PM (5 tabs in the morning, and 5 tabs in the evening; all taken within the same day each week), Disp: 120 tablet, Rfl: 1    Allergies -   Allergies   Allergen Reactions     Abatacept      Severe headaches     Adhesive Tape      Plastic tape     Celebrex [Celecoxib]      Ineffective     Ethanol      Antihistamines     Orencia [Abatacept]      Headache     Septra [Bactrim]      Sulfa Drugs      \"deathly ill\"     Tramadol      Headache       Social History -   Social History     Social History     Marital status: Single     Spouse name: N/A     Number of children: 3     Years of education: N/A     Occupational History     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     Drug use: No     Sexual activity: No     Other Topics Concern     Parent/Sibling W/ Cabg, Mi Or Angioplasty Before 65f 55m? No     Caffeine Concern Yes     She has 8 cups of coffee in the AM and is done by 8-8:30 AM.     Exercise Yes     Sometimes.  [employer] comes 3 times a week to her building.     Social History Narrative    She lives in a a senior living apartment building.       Family History -   Family History   Problem Relation Age of Onset     Arthritis Mother      Alzheimer Disease Mother      Hyperlipidemia Mother      Osteoperosis Mother      HEART DISEASE Father      MI ( from this)     Alcohol/Drug Father      Arthritis Sister      Hypertension Sister      Cancer Son      " "Diabetes Son      Neurologic Disorder Sister      Schizophrenic     Hypertension Sister      Hyperlipidemia Sister      Mental Illness Sister      Diabetes Son      Other Cancer Son        Review of Systems - As per HPI and PMHx, otherwise 7 system review of the head and neck negative.    Physical Exam  /76 (Cuff Size: Adult Large)  Pulse 95  Resp 16  Ht 1.715 m (5' 7.5\")  Wt 85.3 kg (188 lb)  SpO2 94%  BMI 29.01 kg/m2  General - The patient is in no distress.  Alert and oriented x3, answers questions and cooperates with examination appropriately.   Voice and Breathing - The patient was breathing comfortably without the use of accessory muscles. There was no wheezing, stridor, or stertor.  The patients voice was clear and strong.  Eyes - Extraocular movements intact. Sclera were not icteric or injected, conjunctiva were pink and moist.  Neurologic - Cranial nerves II-XII are grossly intact. Specifically, the facial nerve is intact, House-Brackmann grade 1 of 6. Facial sensation is intact and symmetric.  Nose - No significant external deformity.  She has an approximate 4 mm superficial ulceration of the left lateral nasal sill and mucosal epithelial junction.  There is some bloody crust around it.  I cleaned this off with hydrogen peroxide.  Further back or septum straight turbinates are normal size no lesions or masses.  Neck -  Palpation of the occipital, submental, submandibular, internal jugular chain, and supraclavicular nodes did not demonstrate any abnormal lymph nodes or masses. The parotid glands were without masses. The trachea was midline.    Procedure: Patient had a superficial ulceration with bloody crusting of her left lateral nasal sill along the mucosal epithelial junction.  This does not appear to be malignancy or mass.  She does state that sometimes drips blood.  Therefore we discussed the risks benefits and alternatives to performing cautery this area. She agreed to proceed.  I sprayed " a small amount of phenylephrine lidocaine spray at the site.  I placed one stick of silver nitrate to cover the ulceration.  Hemostasis was achieved.  I then placed bacitracin ointment on this.    A/P - Ginger Marhsall is a 78 year old female with a lesion on the left lateral nasal sill and mucosal epithelial junction.  I think it is happening is she has vasomotor rhinitis with constant nasal drip that irritates this area.  She gets the Kleenex up into this area and has had lots of bleeding and scabbing.  I cauterized the site to reset the wound today.  I want her to keep copious amounts of Vaseline or Aquaphor on this to keep it always moist.  I do not want her to get the finger Kleenex up at the site any longer.  In addition I will prescribe her Atrovent nasal spray to try to keep her nose from dripping and irritating this area.  Return in 1 month for recheck.  If this is not getting better off to perform biopsy.         Ry Espinosa MD  Otolaryngology  [facility]

## 2018-06-27 NOTE — LETTER
6/27/2018         RE: Ginger Marshall  [address]  [facility] [id number]        Dear Colleague,    Thank you for referring your patient, Ginger Marshall, to the [facility]. Please see a copy of my visit note below.    Chief Complaint - nose sore    History of Present Illness - Ginger Marshall is a 78 year old female presents with a lesion in the nose. The patient has noticed for approximately 3 months. Has had some bleeding. It is getting a little better. Had this in the past, worse in the winter, but it usually goes away by now. Some epistaxis with it. It isn't painful. Uses vaseline 1-2 times per day. She has nasal drainage. nonsmoker. No lumps or swollen glands in the neck.     If seen her in the past for thyroid nodules.  I called her this week to notify her that her recent thyroid ultrasound shows stable nodules or even at some have decreased in size.  She notes no new thyroid or neck symptoms.    Past Medical History -   Patient Active Problem List   Diagnosis     RA (Rheumatoid Arthritis) off Plaquenil     Menopause     History of colonic polyps     Mitral Regurgitation     Multinodular goiter     CARDIOVASCULAR SCREENING; LDL GOAL LESS THAN 130     Psychophysiologic insomnia     Pulmonary nodule     PSEUDOPHAKIA OU     PVD (POSTERIOR VITREOUS DETACHMENT) OU     PXF (PSEUDOEXFOLIATION OF LENS CAPSULE) OD     GERD (gastroesophageal reflux disease)     Hyperlipidemia LDL goal <100     Advance Care Planning     Neuropathy     SHERRY (obstructive sleep apnea)-severe (AHI 35)     zEncounter for counseling     Anemia of chronic disease     Migraine     Osteoporosis     Cornea guttata, ou     Conjunctival concretions     Stenosis, spinal, lumbar     Other chronic pain     Obesity     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     MGD (meibomian gland dysfunction)     Blepharitis of both eyes     Personal history of healed osteoporosis fracture     Iron malabsorption      Hyperglycemia     Chronic bilateral low back pain without sciatica     Allergic state, subsequent encounter     BMI 32.0-32.9,adult     Spinal stenosis of lumbar region without neurogenic claudication     Herniated nucleus pulposus, L3-4     S/P lumbar fusion     Anxiety     Low iron     BMI 28.0-28.9,adult     History of depression       Current Medications -   Current Outpatient Prescriptions:      acetaminophen (TYLENOL) 500 MG tablet, Take 2 tablets (1,000 mg) by mouth every 8 hours as needed for pain, Disp: 100 tablet, Rfl: 0     Ascorbic Acid 1000 MG TABS, Take 1 tablet by mouth 3 times daily, Disp: , Rfl:      aspirin 81 MG tablet, Take 1 tablet by mouth daily., Disp: , Rfl:      atorvastatin (LIPITOR) 40 MG tablet, Take 1 tablet (40 mg) by mouth daily, Disp: 90 tablet, Rfl: 2     Calcium Citrate-Vitamin D (CALCIUM CITRATE + PO), Take 2 tablets by mouth daily, Disp: , Rfl:      Cholecalciferol (VITAMIN D-3 PO), Take 1,000 Units by mouth 2 times daily, Disp: , Rfl:      cyanocobalamin 500 MCG TABS, Take 1 tablet by mouth 2 times daily, Disp: , Rfl:      cyclobenzaprine (FLEXERIL) 10 MG tablet, Take 1 tablet (10 mg) by mouth 2 times daily as needed for muscle spasms, Disp: 60 tablet, Rfl: 2     ferrous gluconate (FERGON) 324 (38 Fe) MG tablet, Take 1 tablet (324 mg) by mouth daily (with breakfast), Disp: 270 tablet, Rfl: 1     FOLIC ACID PO, Take 800 mcg by mouth daily, Disp: , Rfl:      magnesium oxide (MAG-OX) 400 (241.3 MG) MG tablet, Take 1 tablet (400 mg) by mouth daily, Disp: , Rfl:      methotrexate 2.5 MG tablet, Take 9 tablets (22.5 mg) by mouth once a week, Disp: 108 tablet, Rfl: 2     multivitamin, therapeutic with minerals (MULTI-VITAMIN) TABS, Take 1 tablet by mouth daily, Disp: , Rfl:      Omega-3 Fatty Acids (OMEGA-3 FISH OIL PO), Take 2,000 mg by mouth daily , Disp: , Rfl:      order for DME, Respironics REMSTAR 60 Series Auto CPAP 12-15 cm H2O, Wisp nasal mask w/a s/m cushion, Disp: , Rfl:       "pantoprazole (PROTONIX) 40 MG EC tablet, TAKE ONE TABLET BY MOUTH EVERY DAY 30-60 MINUTES BEFORE A MEAL, Disp: 90 tablet, Rfl: 2     PREDNISONE PO, Take 20 mg by mouth as needed, Disp: , Rfl:      tofacitinib (XELJANZ XR) 11 MG 24 hr tablet, Take 1 tablet (11 mg) by mouth daily, Disp: 90 tablet, Rfl: 3     vitamin  B complex with vitamin C (VITAMIN  B COMPLEX) TABS, Take 1 tablet by mouth daily., Disp: , Rfl: 0     VITAMIN E PO, Take 2,000 Units by mouth daily , Disp: , Rfl:      [DISCONTINUED] methotrexate 2.5 MG tablet, Take 10 tablets (25 mg) by mouth once a week .  Split dose between AM and PM (5 tabs in the morning, and 5 tabs in the evening; all taken within the same day each week), Disp: 120 tablet, Rfl: 1    Allergies -   Allergies   Allergen Reactions     Abatacept      Severe headaches     Adhesive Tape      Plastic tape     Celebrex [Celecoxib]      Ineffective     Ethanol      Antihistamines     Orencia [Abatacept]      Headache     Septra [Bactrim]      Sulfa Drugs      \"deathly ill\"     Tramadol      Headache       Social History -   Social History     Social History     Marital status: Single     Spouse name: N/A     Number of children: 3     Years of education: N/A     Occupational History     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     Drug use: No     Sexual activity: No     Other Topics Concern     Parent/Sibling W/ Cabg, Mi Or Angioplasty Before 65f 55m? No     Caffeine Concern Yes     She has 8 cups of coffee in the AM and is done by 8-8:30 AM.     Exercise Yes     Sometimes.  [employer] comes 3 times a week to her building.     Social History Narrative    She lives in a a senior living apartment building.       Family History -   Family History   Problem Relation " "Age of Onset     Arthritis Mother      Alzheimer Disease Mother      Hyperlipidemia Mother      Osteoperosis Mother      HEART DISEASE Father      MI ( from this)     Alcohol/Drug Father      Arthritis Sister      Hypertension Sister      Cancer Son      Diabetes Son      Neurologic Disorder Sister      Schizophrenic     Hypertension Sister      Hyperlipidemia Sister      Mental Illness Sister      Diabetes Son      Other Cancer Son        Review of Systems - As per HPI and PMHx, otherwise 7 system review of the head and neck negative.    Physical Exam  /76 (Cuff Size: Adult Large)  Pulse 95  Resp 16  Ht 1.715 m (5' 7.5\")  Wt 85.3 kg (188 lb)  SpO2 94%  BMI 29.01 kg/m2  General - The patient is in no distress.  Alert and oriented x3, answers questions and cooperates with examination appropriately.   Voice and Breathing - The patient was breathing comfortably without the use of accessory muscles. There was no wheezing, stridor, or stertor.  The patients voice was clear and strong.  Eyes - Extraocular movements intact. Sclera were not icteric or injected, conjunctiva were pink and moist.  Neurologic - Cranial nerves II-XII are grossly intact. Specifically, the facial nerve is intact, House-Brackmann grade 1 of 6. Facial sensation is intact and symmetric.  Nose - No significant external deformity.  She has an approximate 4 mm superficial ulceration of the left lateral nasal sill and mucosal epithelial junction.  There is some bloody crust around it.  I cleaned this off with hydrogen peroxide.  Further back or septum straight turbinates are normal size no lesions or masses.  Neck -  Palpation of the occipital, submental, submandibular, internal jugular chain, and supraclavicular nodes did not demonstrate any abnormal lymph nodes or masses. The parotid glands were without masses. The trachea was midline.    Procedure: Patient had a superficial ulceration with bloody crusting of her left lateral nasal sill " along the mucosal epithelial junction.  This does not appear to be malignancy or mass.  She does state that sometimes drips blood.  Therefore we discussed the risks benefits and alternatives to performing cautery this area. She agreed to proceed.  I sprayed a small amount of phenylephrine lidocaine spray at the site.  I placed one stick of silver nitrate to cover the ulceration.  Hemostasis was achieved.  I then placed bacitracin ointment on this.    A/P - Ginger Marshall is a 78 year old female with a lesion on the left lateral nasal sill and mucosal epithelial junction.  I think it is happening is she has vasomotor rhinitis with constant nasal drip that irritates this area.  She gets the Kleenex up into this area and has had lots of bleeding and scabbing.  I cauterized the site to reset the wound today.  I want her to keep copious amounts of Vaseline or Aquaphor on this to keep it always moist.  I do not want her to get the finger Kleenex up at the site any longer.  In addition I will prescribe her Atrovent nasal spray to try to keep her nose from dripping and irritating this area.  Return in 1 month for recheck.  If this is not getting better off to perform biopsy.         Ry Espinosa MD  Otolaryngology  [facility]      Again, thank you for allowing me to participate in the care of your patient.        Sincerely,        Ry Espinosa MD

## 2018-06-28 ENCOUNTER — TELEPHONE (OUTPATIENT)
Dept: FAMILY MEDICINE | Facility: CLINIC | Age: 78
End: 2018-06-28

## 2018-06-28 DIAGNOSIS — M06.9 RA (RHEUMATOID ARTHRITIS) (H): Primary | ICD-10-CM

## 2018-06-28 RX ORDER — PREDNISONE 1 MG/1
20 TABLET ORAL PRN
Qty: 5 TABLET | Refills: 0 | Status: SHIPPED | OUTPATIENT
Start: 2018-06-28 | End: 2018-12-12

## 2018-06-28 RX ORDER — PREDNISONE 20 MG/1
20 TABLET ORAL DAILY
Qty: 5 TABLET | Refills: 0 | Status: SHIPPED | OUTPATIENT
Start: 2018-06-28 | End: 2018-07-03

## 2018-06-28 NOTE — TELEPHONE ENCOUNTER
Pt reports pain, redness, and swelling in the left arm from the shoulder to the elbow.  She states she usually has an rx for 5 tabs of prednisone 20 mg to use on flares.  Has upcoming apt for 8/8/18.  Rx refilled per last chart note.    Diomedes Yates RN....6/28/2018 2:43 PM

## 2018-06-28 NOTE — TELEPHONE ENCOUNTER
Corrected rx to Prednisone one 20 mg tablet x 5 days.    Diomedes Yates RN....6/28/2018 3:02 PM

## 2018-07-11 ENCOUNTER — TELEPHONE (OUTPATIENT)
Dept: OPHTHALMOLOGY | Facility: CLINIC | Age: 78
End: 2018-07-11

## 2018-07-11 NOTE — TELEPHONE ENCOUNTER
Patient called in was having trouble last evening with the upper eyelid.  She thinks it may be a stye or chalazion.  She started the warm packs today and it is getting slightly better.  Less red and less swelling.  Asked her to continue the warm packs for a good week or so and also take her artificial tears at least four times a day. She will try this and call back if the eyes do not get any better.  She has a complete scheduled in eight days.

## 2018-07-11 NOTE — TELEPHONE ENCOUNTER
Reason for call:  Other   Patient called regarding (reason for call): call back  Additional comments: Ginger called and left a voice mail that she is having problems with her one eye, swelling of her eye lids and some itching.  She does have an appointment next week for a complete exam but thought she should be seen sooner or if there is something she could be doing at home for this.  Please call her and advise.    Phone number to reach patient:  Home number on file [phone] (home)    Best Time:  any    Can we leave a detailed message on this number?  YES

## 2018-07-19 ENCOUNTER — OFFICE VISIT (OUTPATIENT)
Dept: OPHTHALMOLOGY | Facility: CLINIC | Age: 78
End: 2018-07-19
Payer: COMMERCIAL

## 2018-07-19 DIAGNOSIS — Z96.1 PSEUDOPHAKIA: ICD-10-CM

## 2018-07-19 DIAGNOSIS — H43.813 PVD (POSTERIOR VITREOUS DETACHMENT), BILATERAL: ICD-10-CM

## 2018-07-19 DIAGNOSIS — H01.02B SQUAMOUS BLEPHARITIS OF UPPER AND LOWER EYELIDS OF BOTH EYES: ICD-10-CM

## 2018-07-19 DIAGNOSIS — H18.519 CORNEA GUTTATA: ICD-10-CM

## 2018-07-19 DIAGNOSIS — H26.8 PXF (PSEUDOEXFOLIATION OF LENS CAPSULE): ICD-10-CM

## 2018-07-19 DIAGNOSIS — H02.889 MGD (MEIBOMIAN GLAND DYSFUNCTION): ICD-10-CM

## 2018-07-19 DIAGNOSIS — H01.02A SQUAMOUS BLEPHARITIS OF UPPER AND LOWER EYELIDS OF BOTH EYES: ICD-10-CM

## 2018-07-19 DIAGNOSIS — Z01.01 ENCOUNTER FOR EXAMINATION OF EYES AND VISION WITH ABNORMAL FINDINGS: Primary | ICD-10-CM

## 2018-07-19 DIAGNOSIS — M05.79 RHEUMATOID ARTHRITIS INVOLVING MULTIPLE SITES WITH POSITIVE RHEUMATOID FACTOR (H): ICD-10-CM

## 2018-07-19 DIAGNOSIS — H52.4 PRESBYOPIA: ICD-10-CM

## 2018-07-19 PROCEDURE — 92014 COMPRE OPH EXAM EST PT 1/>: CPT | Performed by: STUDENT IN AN ORGANIZED HEALTH CARE EDUCATION/TRAINING PROGRAM

## 2018-07-19 PROCEDURE — 92015 DETERMINE REFRACTIVE STATE: CPT | Performed by: STUDENT IN AN ORGANIZED HEALTH CARE EDUCATION/TRAINING PROGRAM

## 2018-07-19 ASSESSMENT — TONOMETRY
OS_IOP_MMHG: 16
IOP_METHOD: APPLANATION
OD_IOP_MMHG: 16

## 2018-07-19 ASSESSMENT — VISUAL ACUITY
METHOD: SNELLEN - LINEAR
OD_CC: 20/20
OS_CC: 20/20-1
OS_CC: J1
OD_CC: J1
CORRECTION_TYPE: GLASSES

## 2018-07-19 ASSESSMENT — REFRACTION_WEARINGRX
OD_AXIS: 177
SPECS_TYPE: PAL
OD_ADD: +3.00
OS_CYLINDER: +0.25
OD_SPHERE: -0.50
OS_ADD: +3.00
OS_SPHERE: -0.25
OD_CYLINDER: +1.00
OS_AXIS: 023

## 2018-07-19 ASSESSMENT — EXTERNAL EXAM - LEFT EYE: OS_EXAM: NORMAL

## 2018-07-19 ASSESSMENT — REFRACTION_MANIFEST
OD_ADD: +3.00
OS_ADD: +3.00
OS_SPHERE: -0.50
OD_AXIS: 175
OS_AXIS: 022
OD_CYLINDER: +0.50
OS_CYLINDER: +0.25
OD_SPHERE: -0.50

## 2018-07-19 ASSESSMENT — CUP TO DISC RATIO
OS_RATIO: 0.35
OD_RATIO: 0.4

## 2018-07-19 ASSESSMENT — CONF VISUAL FIELD
OS_NORMAL: 1
OD_NORMAL: 1

## 2018-07-19 ASSESSMENT — EXTERNAL EXAM - RIGHT EYE: OD_EXAM: NORMAL

## 2018-07-19 NOTE — MR AVS SNAPSHOT
After Visit Summary   7/19/2018    Ginger Marshall    MRN: [id number]           Patient Information     Date Of Birth          1940        Visit Information        Provider Department      7/19/2018 10:00 AM Aguila San MD [place] [facility]        Today's Diagnoses     Encounter for examination of eyes and vision with abnormal findings    -  1    Presbyopia        PXF (PSEUDOEXFOLIATION OF LENS CAPSULE) OD        PSEUDOPHAKIA OU        Cornea guttata, ou        PVD (posterior vitreous detachment), bilateral        MGD (meibomian gland dysfunction)        Squamous blepharitis of upper and lower eyelids of both eyes        Rheumatoid arthritis involving multiple sites with positive rheumatoid factor (H)          Care Instructions    Glasses prescription given - optional    Aguila San MD  [phone]            Follow-ups after your visit        Follow-up notes from your care team     Return in about 1 year (around 7/19/2019) for Complete Exam.      Your next 10 appointments already scheduled     Aug 06, 2018 11:00 AM CDT   LAB with  LAB   [place] [facility] ([facility] [place])    [address]  [place] [id number] [phone]           Please do not eat 10-12 hours before your appointment if you are coming in fasting for labs on lipids, cholesterol, or glucose (sugar). This does not apply to pregnant women. Water, hot tea and black coffee (with nothing added) are okay. Do not drink other fluids, diet soda or chew gum.            Aug 08, 2018 11:00 AM CDT   Return Visit with MD [place] [facility] ([facility] [place])    [address]  [place] [id number] [phone]              Who to contact     If you have questions or need follow up information about today's clinic visit or your schedule please contact [place] [facility] directly at [phone].  Normal or non-critical lab and imaging results will  be communicated to you by MyChart, letter or phone within 4 business days after the clinic has received the results. If you do not hear from us within 7 days, please contact the clinic through [employer] or phone. If you have a critical or abnormal lab result, we will notify you by phone as soon as possible.  Submit refill requests through [facility] or call your pharmacy and they will forward the refill request to us. Please allow 3 business days for your refill to be completed.          Additional Information About Your Visit        [facility] Information     [facility] gives you secure access to your electronic health record. If you see a primary care provider, you can also send messages to your care team and make appointments. If you have questions, please call your primary care clinic.  If you do not have a primary care provider, please call [phone] and they will assist you.        Care EveryWhere ID     This is your Care EveryWhere ID. This could be used by other organizations to access your [facility] medical records  [id number]         Blood Pressure from Last 3 Encounters:   06/27/18 127/76   06/22/18 120/76   02/22/18 106/69    Weight from Last 3 Encounters:   06/27/18 85.3 kg (188 lb)   06/22/18 83.9 kg (185 lb)   02/22/18 83.9 kg (185 lb)              We Performed the Following     EYE EXAM (SIMPLE-NONBILLABLE)     REFRACTIVE STATUS        Primary Care Provider Office Phone # Fax #    Georgia Vázquez [phone] [address] [address]        Equal Access to Services     [facility] Center[facility]: Hadii aad ku hadasho Soomaali, waaxda luqadaha, qaybta kaalmada adeegyada, waxay rolanda haywilian barton'wilian . So [facility] [phone].    ATENCIÓN: Si habla español, tiene a herrera disposición servicios gratuitos de asistencia lingüística. Llame al [phone].    We comply with applicable federal civil rights laws and [place] laws. We do not discriminate on the basis of race, color,  national origin, age, disability, sex, sexual orientation, or gender identity.            Thank you!     Thank you for choosing [facility]  for your care. Our goal is always to provide you with excellent care. Hearing back from our patients is one way we can continue to improve our services. Please take a few minutes to complete the written survey that you may receive in the mail after your visit with us. Thank you!             Your Updated Medication List - Protect others around you: Learn how to safely use, store and throw away your medicines at www.disposemymeds.org.          This list is accurate as of 7/19/18 10:49 AM.  Always use your most recent med list.                   Brand Name Dispense Instructions for use Diagnosis    ascorbic acid 1000 MG Tabs tablet      Take 1 tablet by mouth 3 times daily        aspirin 81 MG tablet      Take 1 tablet by mouth daily.        atorvastatin 40 MG tablet    LIPITOR    90 tablet    Take 1 tablet (40 mg) by mouth daily    Hyperlipidemia LDL goal <100       CALCIUM CITRATE + PO      Take 2 tablets by mouth daily        cyanocobalamin 500 MCG Tabs      Take 1 tablet by mouth 2 times daily        cyclobenzaprine 10 MG tablet    FLEXERIL    60 tablet    Take 1 tablet (10 mg) by mouth 2 times daily as needed for muscle spasms    S/P lumbar fusion       ferrous gluconate 324 (38 Fe) MG tablet    FERGON    270 tablet    Take 1 tablet (324 mg) by mouth daily (with breakfast)    Low iron       FOLIC ACID PO      Take 800 mcg by mouth daily        ipratropium 0.06 % spray    ATROVENT    1 Box    Spray 2 sprays into both nostrils 4 times daily as needed    Chronic vasomotor rhinitis       magnesium oxide 400 (241.3 Mg) MG tablet    MAG-OX     Take 1 tablet (400 mg) by mouth daily        methotrexate 2.5 MG tablet     108 tablet    Take 9 tablets (22.5 mg) by mouth once a week    Rheumatoid arthritis involving multiple sites with positive rheumatoid factor (H)        Multi-vitamin Tabs tablet      Take 1 tablet by mouth daily        OMEGA-3 FISH OIL PO      Take 2,000 mg by mouth daily        order for DME      Respironics REMSTAR 60 Series Auto CPAP 12-15 cm H2O, Wisp nasal mask w/a s/m cushion        pantoprazole 40 MG EC tablet    PROTONIX    90 tablet    TAKE ONE TABLET BY MOUTH EVERY DAY 30-60 MINUTES BEFORE A MEAL    Gastroesophageal reflux disease without esophagitis       predniSONE 1 MG tablet    DELTASONE    5 tablet    Take 20 tablets (20 mg) by mouth as needed    RA (rheumatoid arthritis) (H)       tofacitinib 11 MG 24 hr tablet    XELJANZ XR    90 tablet    Take 1 tablet (11 mg) by mouth daily    Rheumatoid arthritis involving multiple sites with positive rheumatoid factor (H)       TYLENOL 500 MG tablet   Generic drug:  acetaminophen     100 tablet    Take 2 tablets (1,000 mg) by mouth every 8 hours as needed for pain        vitamin B complex with vitamin C Tabs tablet      Take 1 tablet by mouth daily.        VITAMIN D-3 PO      Take 1,000 Units by mouth 2 times daily        VITAMIN E PO      Take 2,000 Units by mouth daily

## 2018-07-19 NOTE — PROGRESS NOTES
Current Eye Medications:  no     Subjective:  Comprehensive eye exam.   Pt reports that she does not see all that well with her glasses, never happy with them.      Objective:  See Ophthalmology Exam.      Assessment:  Ginger Marshall is a 78 year old female who presents with:     PXF (PSEUDOEXFOLIATION OF LENS CAPSULE) OD      PSEUDOPHAKIA OU      Cornea guttata, ou      PVD (posterior vitreous detachment), bilateral      MGD (meibomian gland dysfunction)      Squamous blepharitis of upper and lower eyelids of both eyes      Rheumatoid arthritis involving multiple sites with positive rheumatoid factor (H)      Plan:  Glasses prescription given - optional    Aguila San MD  [phone]

## 2018-07-19 NOTE — LETTER
7/19/2018         RE: Ginger Marshall  [address]  [facility] [address]        Dear Colleague,    Thank you for referring your patient, Ginger Marshall, to the [facility].    Her eye exam is stable.   Please see a copy of my visit note below.     Current Eye Medications:  no     Subjective:  Comprehensive eye exam.   Pt reports that she does not see all that well with her glasses, never happy with them.      Objective:  See Ophthalmology Exam.      Assessment:  Ginger Marshall is a 78 year old female who presents with:     PXF (PSEUDOEXFOLIATION OF LENS CAPSULE) OD      PSEUDOPHAKIA OU      Cornea guttata, ou      PVD (posterior vitreous detachment), bilateral      MGD (meibomian gland dysfunction)      Squamous blepharitis of upper and lower eyelids of both eyes      Rheumatoid arthritis involving multiple sites with positive rheumatoid factor (H)      Plan:  Glasses prescription given - optional    Aguila San MD  [phone]             Again, thank you for allowing me to participate in the care of your patient.        Sincerely,        Aguila San MD

## 2018-08-06 DIAGNOSIS — Z79.899 HIGH RISK MEDICATION USE: ICD-10-CM

## 2018-08-06 DIAGNOSIS — M05.79 RHEUMATOID ARTHRITIS INVOLVING MULTIPLE SITES WITH POSITIVE RHEUMATOID FACTOR (H): ICD-10-CM

## 2018-08-06 LAB
ALBUMIN SERPL-MCNC: 3.7 G/DL (ref 3.4–5)
ALP SERPL-CCNC: 50 U/L (ref 40–150)
ALT SERPL W P-5'-P-CCNC: 30 U/L (ref 0–50)
AST SERPL W P-5'-P-CCNC: 24 U/L (ref 0–45)
BASOPHILS # BLD AUTO: 0 10E9/L (ref 0–0.2)
BASOPHILS NFR BLD AUTO: 0.1 %
BILIRUB DIRECT SERPL-MCNC: <0.1 MG/DL (ref 0–0.2)
BILIRUB SERPL-MCNC: 0.3 MG/DL (ref 0.2–1.3)
CREAT SERPL-MCNC: 0.65 MG/DL (ref 0.52–1.04)
CRP SERPL-MCNC: 3.4 MG/L (ref 0–8)
DIFFERENTIAL METHOD BLD: NORMAL
EOSINOPHIL # BLD AUTO: 0.3 10E9/L (ref 0–0.7)
EOSINOPHIL NFR BLD AUTO: 3.9 %
ERYTHROCYTE [DISTWIDTH] IN BLOOD BY AUTOMATED COUNT: 13.8 % (ref 10–15)
ERYTHROCYTE [SEDIMENTATION RATE] IN BLOOD BY WESTERGREN METHOD: 49 MM/H (ref 0–30)
GFR SERPL CREATININE-BSD FRML MDRD: 88 ML/MIN/1.7M2
HCT VFR BLD AUTO: 37.8 % (ref 35–47)
HGB BLD-MCNC: 12.4 G/DL (ref 11.7–15.7)
LYMPHOCYTES # BLD AUTO: 1.5 10E9/L (ref 0.8–5.3)
LYMPHOCYTES NFR BLD AUTO: 22.1 %
MCH RBC QN AUTO: 31.9 PG (ref 26.5–33)
MCHC RBC AUTO-ENTMCNC: 32.8 G/DL (ref 31.5–36.5)
MCV RBC AUTO: 97 FL (ref 78–100)
MONOCYTES # BLD AUTO: 0.7 10E9/L (ref 0–1.3)
MONOCYTES NFR BLD AUTO: 10.4 %
NEUTROPHILS # BLD AUTO: 4.3 10E9/L (ref 1.6–8.3)
NEUTROPHILS NFR BLD AUTO: 63.5 %
PLATELET # BLD AUTO: 376 10E9/L (ref 150–450)
PROT SERPL-MCNC: 7.9 G/DL (ref 6.8–8.8)
RBC # BLD AUTO: 3.89 10E12/L (ref 3.8–5.2)
WBC # BLD AUTO: 6.7 10E9/L (ref 4–11)

## 2018-08-06 PROCEDURE — 86140 C-REACTIVE PROTEIN: CPT | Performed by: INTERNAL MEDICINE

## 2018-08-06 PROCEDURE — 80076 HEPATIC FUNCTION PANEL: CPT | Performed by: INTERNAL MEDICINE

## 2018-08-06 PROCEDURE — 82565 ASSAY OF CREATININE: CPT | Performed by: INTERNAL MEDICINE

## 2018-08-06 PROCEDURE — 85025 COMPLETE CBC W/AUTO DIFF WBC: CPT | Performed by: INTERNAL MEDICINE

## 2018-08-06 PROCEDURE — 85652 RBC SED RATE AUTOMATED: CPT | Performed by: INTERNAL MEDICINE

## 2018-08-06 PROCEDURE — 36415 COLL VENOUS BLD VENIPUNCTURE: CPT | Performed by: INTERNAL MEDICINE

## 2018-08-08 ENCOUNTER — OFFICE VISIT (OUTPATIENT)
Dept: RHEUMATOLOGY | Facility: CLINIC | Age: 78
End: 2018-08-08
Payer: COMMERCIAL

## 2018-08-08 VITALS
OXYGEN SATURATION: 96 % | DIASTOLIC BLOOD PRESSURE: 79 MMHG | BODY MASS INDEX: 29.63 KG/M2 | HEART RATE: 96 BPM | SYSTOLIC BLOOD PRESSURE: 123 MMHG | TEMPERATURE: 96 F | WEIGHT: 192 LBS

## 2018-08-08 DIAGNOSIS — Z79.899 HIGH RISK MEDICATION USE: ICD-10-CM

## 2018-08-08 DIAGNOSIS — M05.79 RHEUMATOID ARTHRITIS INVOLVING MULTIPLE SITES WITH POSITIVE RHEUMATOID FACTOR (H): Primary | ICD-10-CM

## 2018-08-08 PROCEDURE — 99214 OFFICE O/P EST MOD 30 MIN: CPT | Performed by: INTERNAL MEDICINE

## 2018-08-08 RX ORDER — METHOTREXATE SODIUM 2.5 MG/1
25 TABLET ORAL WEEKLY
Qty: 120 TABLET | Refills: 1 | Status: SHIPPED | OUTPATIENT
Start: 2018-08-08 | End: 2018-12-12

## 2018-08-08 RX ORDER — HYDROXYCHLOROQUINE SULFATE 200 MG/1
200 TABLET, FILM COATED ORAL 2 TIMES DAILY
Qty: 60 TABLET | Refills: 4 | Status: SHIPPED | OUTPATIENT
Start: 2018-08-08 | End: 2018-12-12

## 2018-08-08 NOTE — NURSING NOTE
"Chief Complaint   Patient presents with     Arthritis     Patient is okay        Initial /79  Pulse 96  Temp 96  F (35.6  C)  Wt 87.1 kg (192 lb)  SpO2 96%  BMI 29.63 kg/m2 Estimated body mass index is 29.63 kg/(m^2) as calculated from the following:    Height as of 6/27/18: 1.715 m (5' 7.5\").    Weight as of this encounter: 87.1 kg (192 lb).  BP completed using cuff size: regular         RAPID3 (0-30) Cumulative Score  7.0          RAPID3 Weighted Score (divide #4 by 3 and that is the weighted score)  2.3         "

## 2018-08-08 NOTE — PROGRESS NOTES
Rheumatology Clinic Visit      Ginger Marshall MRN# [id number]   YOB: 1940 Age: 78 year old      Date of visit: 8/08/18   PCP: Georgia Vázquez MD     Chief Complaint   Patient presents with:  Arthritis: Patient is okay     Assessment and Plan   1. Seropositive ( [2009], CCP >100 [2009]; hx of rheumatoid nodule by 6/14/2011 pathology) Erosive Rheumatoid Arthritis: Previously failed remicade (staph infection during therapy, but was immediately after a joint injection) and orencia (migraines).  Sulfa allergy.  Currently on methotrexate 25 mg once weekly, folic acid 800 mcg daily, Xeljanz XR 11mg daily. She also has prednisone 20 mg daily ×5 days to use as needed for flare. Methotrexate was reduced in the past with worsening symptoms.   Note that she had a history of oral sore that resolved with folic acid 2mg daily; she ran out of folic acid and did not want to restart it yet; she takes other supplements that total 800mcg of folic acid; if needed in the future may restart additional folic acid.  More active symptoms recently and was previously doing well when on a higher dose of methotrexate so will increase that today.     - Increase methotrexate to 25 mg once weekly  - Continue folic acid 800mcg daily as noted above (from over-the-counter supplements)  - Start hydroxychloroquine 200mg BID  - Ophthalmology referral for hydroxychloroquine toxicity monitoring  - Continue Xeljanz XR 11mg daily  - Labs in 3 months: CBC, Creatinine, Hepatic Panel, ESR, CRP               Rapid 3, cumulative scores                       08/08/2018: 7 (MTX 22.5mg wkly, Xeljanz XR 11mg daily)                       02/07/2018: 4 (MTX 22.5mg wkly, Xeljanz XR 11mg daily)                       11/08/2017: 5 (MTX 22.5mg wkly, Xeljanz XR 11mg daily)                       08/09/2017: 0 (MTX 22.5mg wkly, Xeljanz XR 11mg daily)                      05/10/2017: 5 (MTX 25mg wkly, Xeljanz XR 11mg daily) RA doing well    #  Hydroxychloroquine (Plaquenil) Risks and Benefits:  The risks and benefits of hydroxychloroquine were discussed in detail and the patient verbalized understanding; the patient also verbalized agreement to get the required ophthalmologic toxicity monitoring.  The risks discussed include, but are not limited to, the risk for hypersensitivity, anaphylaxis, anaphylactoid reactions, irreversible retinal damage, rare hematologic reactions, and rare cardiomyopathy.  I encouraged reviewing the package insert and asking any questions about the medication.             2. Right hip pain: Status post WALTER twice in the past. Followed by [facility] orthopedics.     3. Bone Health: Managed by Endocrinology.     4. Iron Deficiency Anemia: Managed by PCP.     Ms. Marshall verbalized agreement with and understanding of the rational for the diagnosis and treatment plan.  All questions were answered to best of my ability and the patient's satisfaction. Ms. Marshall was advised to contact the clinic with any questions that may arise after the clinic visit.      Thank you for involving me in the care of the patient    Return to clinic: 3-4 months    HPI   Ginger Marshall is a 78 year old female with a history of multiple foot surgeries, hand tendon transfers, MCP replacements (most recent being in August 2015), bilateral TKA, right WALTER, left distal radius fracture history, s/p lumbar fusion, osteoporosis and seropositive (RF+, CCP+) erosive rheumatoid arthritis who presents for follow-up of rheumatoid arthritis.      Today, Ms. Marshall reports that she is not doing as well as she was in the past.  Has more swelling and pain in her hands, morning stiffness that lasts for most of the day whereas before it was lasting for no more than 3-5 minutes.  Pain and stiffness improved with activity and worsens with inactivity.     Denies fevers, chills, nausea, vomiting, constipation, diarrhea. No abdominal pain. No chest pain/pressure, palpitations, or  shortness of breath. No nasal or oral sores.   No neck pain. No rash. No LE swelling.     Tobacco: quit in 1999  EtOH: 1 glass of wine every month at most  Drugs: None  Occupation: , retired     ROS   GEN: No fevers, chills  SKIN: No itching, rashes, sores  HEENT:  No oral or nasal ulcers.  CV: No chest pain, pressure, palpitations, or dyspnea on exertion.  PULM: No SOB, wheeze, cough.  GI:  No nausea, vomiting, constipation, diarrhea. No blood in stool. No abdominal pain.  : No blood in urine.  MSK: See HPI.  NEURO: No numbness or tingling  EXT: No LE swelling    Active Problem List     Patient Active Problem List   Diagnosis     RA (Rheumatoid Arthritis) off Plaquenil     Menopause     History of colonic polyps     Mitral Regurgitation     Multinodular goiter     CARDIOVASCULAR SCREENING; LDL GOAL LESS THAN 130     Psychophysiologic insomnia     Pulmonary nodule     PSEUDOPHAKIA OU     PVD (POSTERIOR VITREOUS DETACHMENT) OU     PXF (PSEUDOEXFOLIATION OF LENS CAPSULE) OD     GERD (gastroesophageal reflux disease)     Hyperlipidemia LDL goal <100     Advance Care Planning     Neuropathy     SHERRY (obstructive sleep apnea)-severe (AHI 35)     zEncounter for counseling     Anemia of chronic disease     Migraine     Osteoporosis     Cornea guttata, ou     Conjunctival concretions     Stenosis, spinal, lumbar     Other chronic pain     Obesity     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     MGD (meibomian gland dysfunction)     Blepharitis of both eyes     Personal history of healed osteoporosis fracture     Iron malabsorption     Hyperglycemia     Chronic bilateral low back pain without sciatica     Allergic state, subsequent encounter     BMI 32.0-32.9,adult     Spinal stenosis of lumbar region without neurogenic claudication     Herniated nucleus pulposus, L3-4     S/P lumbar fusion     Anxiety     Low iron     BMI 28.0-28.9,adult     History of depression     Past Medical History  "    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     History of total knee replacement 7/23/2009     Menopause late 40's     Other chronic pain     joints     Pelvic fracture (H) 5/13/2014     PUD (peptic ulcer disease)      Rheumatoid arteritis      Sleep apnea     Uses a CPAP     Vitamin B12 deficiency      Past Surgical History     Past Surgical History:   Procedure Laterality Date     ABDOMEN SURGERY      c-sections     ARTHROSCOPY KNEE RT/LT  01/06    left     BACK SURGERY  2013    disc     BREAST BIOPSY, RT/LT      left benign     BREAST SURGERY  90's    lumpectomy     C ANESTH,TOTAL HIP ARTHROPLASTY  2010    Rt hip     C HAND/FINGER SURGERY UNLISTED  11/05    right hand     C NONSPECIFIC PROCEDURE  91    left foot surgery     C NONSPECIFIC PROCEDURE  95    R MCP surgery     C TOTAL KNEE ARTHROPLASTY  2006    left     C/SECTION, LOW TRANSVERSE  66,72    x 2     CATARACT IOL, RT/LT       COLONOSCOPY  2006,2009     EYE SURGERY      cataracs     HC ESOPH/GAS REFLUX TEST W NASAL IMPED >1 HR  2/1/2012    Procedure:ESOPHAGEAL IMPEDENCE FUNCTION TEST WITH 24 HOUR PH GREATER THAN 1 HOUR; Surgeon:KAYKAY JULIO; Location:[facility]     IR CAUDAL EPIDURAL INJECTION SINGLE  5/2/2012    [facility] L5-S1 at [facility]     OPTICAL TRACKING SYSTEM FUSION POSTERIOR SPINE LUMBAR N/A 4/3/2017    Procedure: OPTICAL TRACKING SYSTEM FUSION SPINE POSTERIOR LUMBAR ONE LEVEL;  Surgeon: Anthony Michele MD;  Location:  OR     SURGICAL HISTORY OF -   2007    right knee total replacement     Allergy     Allergies   Allergen Reactions     Abatacept      Severe headaches     Adhesive Tape      Plastic tape     Celebrex [Celecoxib]      Ineffective     Ethanol      Antihistamines     Orencia [Abatacept]      Headache     Septra [Bactrim]      Sulfa Drugs      \"deathly ill\"     Tramadol      Headache "     Current Medication List     Current Outpatient Prescriptions   Medication Sig     acetaminophen (TYLENOL) 500 MG tablet Take 2 tablets (1,000 mg) by mouth every 8 hours as needed for pain     Ascorbic Acid 1000 MG TABS Take 1 tablet by mouth 3 times daily     aspirin 81 MG tablet Take 1 tablet by mouth daily.     atorvastatin (LIPITOR) 40 MG tablet Take 1 tablet (40 mg) by mouth daily     Calcium Citrate-Vitamin D (CALCIUM CITRATE + PO) Take 2 tablets by mouth daily     Cholecalciferol (VITAMIN D-3 PO) Take 1,000 Units by mouth 2 times daily     cyanocobalamin 500 MCG TABS Take 1 tablet by mouth 2 times daily     ferrous gluconate (FERGON) 324 (38 Fe) MG tablet Take 1 tablet (324 mg) by mouth daily (with breakfast)     FOLIC ACID PO Take 800 mcg by mouth daily     magnesium oxide (MAG-OX) 400 (241.3 MG) MG tablet Take 1 tablet (400 mg) by mouth daily     methotrexate 2.5 MG tablet Take 9 tablets (22.5 mg) by mouth once a week     multivitamin, therapeutic with minerals (MULTI-VITAMIN) TABS Take 1 tablet by mouth daily     Omega-3 Fatty Acids (OMEGA-3 FISH OIL PO) Take 2,000 mg by mouth daily      pantoprazole (PROTONIX) 40 MG EC tablet TAKE ONE TABLET BY MOUTH EVERY DAY 30-60 MINUTES BEFORE A MEAL     tofacitinib (XELJANZ XR) 11 MG 24 hr tablet Take 1 tablet (11 mg) by mouth daily     vitamin  B complex with vitamin C (VITAMIN  B COMPLEX) TABS Take 1 tablet by mouth daily.     VITAMIN E PO Take 2,000 Units by mouth daily      cyclobenzaprine (FLEXERIL) 10 MG tablet Take 1 tablet (10 mg) by mouth 2 times daily as needed for muscle spasms (Patient not taking: Reported on 8/8/2018)     ipratropium (ATROVENT) 0.06 % spray Spray 2 sprays into both nostrils 4 times daily as needed (Patient not taking: Reported on 8/8/2018)     order for [facility] REMSTAR 60 Series Auto CPAP 12-15 cm H2O, Wisp nasal mask w/a s/m cushion     predniSONE (DELTASONE) 1 MG tablet Take 20 tablets (20 mg) by mouth as needed (Patient not  "taking: Reported on 2018)     [DISCONTINUED] methotrexate 2.5 MG tablet Take 10 tablets (25 mg) by mouth once a week .  Split dose between AM and PM (5 tabs in the morning, and 5 tabs in the evening; all taken within the same day each week)     No current facility-administered medications for this visit.      Social History   See HPI    Family History     Family History   Problem Relation Age of Onset     Arthritis Mother      Alzheimer Disease Mother      Hyperlipidemia Mother      Osteoperosis Mother      HEART DISEASE Father      MI ( from this)     Alcohol/Drug Father      Arthritis Sister      Hypertension Sister      Cancer Son      Diabetes Son      Neurologic Disorder Sister      Schizophrenic     Hypertension Sister      Hyperlipidemia Sister      Mental Illness Sister      Diabetes Son      Other Cancer Son      No change in family history since the previous clinic visit.    Physical Exam     Temp Readings from Last 3 Encounters:   18 96  F (35.6  C)   18 97.6  F (36.4  C) (Oral)   18 97.2  F (36.2  C) (Oral)     BP Readings from Last 5 Encounters:   18 123/79   18 127/76   18 120/76   18 106/69   18 151/85     Pulse Readings from Last 1 Encounters:   18 96     Resp Readings from Last 1 Encounters:   18 16     Estimated body mass index is 29.63 kg/(m^2) as calculated from the following:    Height as of 18: 1.715 m (5' 7.5\").    Weight as of this encounter: 87.1 kg (192 lb).    GEN: NAD, overweight  HEENT: MMM. No oral lesions. Anicteric, noninjected sclera  CV: S1, S2. RRR. No m/r/g.  PULM: CTA bilaterally. No w/c.  MSK: Synovial swelling and tenderness palpation the bilateral second and fourth MCPs.   Subluxation of the bilateral MCPs. Right hand with old surgical scars over the MCPs. Bilateral second PIPs tender to palpation with mild synovial swelling; right third PIP with subtle synovial swelling and tenderness to palpation.  " Wrists, elbows, shoulders, knees, ankles, and MTPs without swelling or tenderness palpation.     SKIN: No rash  EXT: No LE edema  PSYCH: Alert. Appropriate.     Labs   RF/CCP  Recent Labs   Lab Test  09/09/11   0843   RHF  38*     CBC  Recent Labs   Lab Test  08/06/18   1055  05/07/18   1033  02/05/18   1021   WBC  6.7  6.3  6.9   RBC  3.89  4.02  4.06   HGB  12.4  12.7  12.7   HCT  37.8  38.9  38.7   MCV  97  97  95   RDW  13.8  14.6  14.8   PLT  376  364  379   MCH  31.9  31.6  31.3   MCHC  32.8  32.6  32.8   NEUTROPHIL  63.5  61.3  60.7   LYMPH  22.1  23.8  22.3   MONOCYTE  10.4  10.9  11.4   EOSINOPHIL  3.9  3.8  5.5   BASOPHIL  0.1  0.2  0.1   ANEU  4.3  3.8  4.2   ALYM  1.5  1.5  1.6   MARYURI  0.7  0.7  0.8   AEOS  0.3  0.2  0.4   ABAS  0.0  0.0  0.0     CMP  Recent Labs   Lab Test  08/06/18   1055  05/07/18   1033  02/05/18   1021   03/27/17   1142   01/27/17   1511  10/28/16   1239   NA   --    --    --    --   139   --   142  143   POTASSIUM   --    --    --    --   4.0   --   4.1  4.3   CHLORIDE   --    --    --    --   107   --   107  109   CO2   --    --    --    --   25   --   25  27   ANIONGAP   --    --    --    --   7   --   10  7   GLC   --    --    --    --   96   --   98  97   BUN   --    --    --    --   11   --   10  11   CR  0.65  0.64  0.61   < >  0.62   < >  0.62  0.70   GFRESTIMATED  88  90  >90   < >  >90  Non  GFR Calc     < >  >90  Non  GFR Calc    81   GFRESTBLACK  >90  >90  >90   < >  >90   GFR Calc     < >  >90   GFR Calc    >90   GFR Calc     BRANDEE   --    --    --    --   9.7   --   9.9  9.4   BILITOTAL  0.3  0.3  0.3   < >   --    < >  0.3  0.4   ALBUMIN  3.7  3.7  3.7   < >   --    < >  3.8  4.0   PROTTOTAL  7.9  7.9  7.8   < >   --    < >  7.6  7.6   ALKPHOS  50  49  50   < >   --    < >  45  46   AST  24  28  21   < >   --    < >  35  22   ALT  30  32  28   < >   --    < >  39  31    < > = values in this  "interval not displayed.     Calcium/VitaminD  Recent Labs   Lab Test  03/27/17   1142  01/27/17   1511  10/28/16   1239   02/05/13   1050   BRANDEE  9.7  9.9  9.4   < >  9.3   VITDT   --    --   37   --   33    < > = values in this interval not displayed.     ESR/CRP  Recent Labs   Lab Test  08/06/18   1055  02/05/18   1021  11/06/17   0952   SED  49*  25  31*   CRP  3.4  2.9  4.6     Lipid Panel  Recent Labs   Lab Test  12/14/17   0957  10/28/16   1238  12/30/15   0842  01/13/14   1109  07/24/13   1024   04/13/12   0831   CHOL  164  176  182  135  177   --   167   TRIG  121  103  70  89  80   --   115   HDL  79  77  92  49*  89   --   59   LDL  61  78  76  68  72   < >  85   VLDL   --    --    --   18  16   --   23   CHOLHDLRATIO   --    --    --   2.8  2.0   --   2.8   NHDL  85  99  90   --    --    --    --     < > = values in this interval not displayed.     Hepatitis B  Recent Labs   Lab Test  09/15/15   1159  04/30/12   1005   AUSAB  0.11   --    HBCAB  Nonreactive   --    HEPBANG  Nonreactive  Negative     Hepatitis C  Recent Labs   Lab Test  09/15/15   1159  04/30/12   1005   HCVAB  Nonreactive   Assay performance characteristics have not been established for newborns,   infants, and children    Negative     Tuberculosis Screening  Recent Labs   Lab Test  05/07/18   1033  09/15/15   1200  04/30/12   1006   TBRSLT  Negative  Negative  Negative   TBAGN  0.00  0.00  0.00     \"HAND BILATERAL THREE OR MORE VIEWS 9/15/2015 12:28 PM   HISTORY: Rheumatoid arthritis; establish baseline. Rheumatoid  arthritis(714.0)  COMPARISON: None  IMPRESSION  IMPRESSION: There is diffuse osteopenia. Postoperative changes of the  right second and third metacarpophalangeal joints. Moderate joint  space narrowing involving the left second and third  metacarpophalangeal joints. Mild joint space narrowing of the right  fourth and fifth metacarpophalangeal joints. There are also small  periarticular erosive changes of the " "metacarpophalangeal joints. There  is fusion of several of the right carpal bones. Marked joint space  loss in the left wrist. Findings are consistent with the clinical  history of rheumatoid arthritis. Chronic fracture deformities of the  right distal radius and ulna. No acute fracture is seen.  SPENCER MONSALVE MD\"    Immunization History     Immunization History   Administered Date(s) Administered     Influenza (High Dose) 3 valent vaccine 10/28/2013, 09/23/2014, 11/11/2015, 09/23/2016     Influenza (IIV3) PF 10/14/2007, 10/21/2009     Mantoux Tuberculin Skin Test 11/03/2006     Pneumo Conj 13-V (2010&after) 07/29/2015     Pneumococcal 23 valent 06/26/2000, 06/02/2010     TD (ADULT, 7+) 07/20/2009          Chart documentation done in part with Dragon Voice recognition Software. Although reviewed after completion, some word and grammatical error may remain.    Nico Byers MD  "

## 2018-08-08 NOTE — MR AVS SNAPSHOT
After Visit Summary   8/8/2018    Ginger Marshall    MRN: [id number]           Patient Information     Date Of Birth          1940        Visit Information        Provider Department      8/8/2018 11:00 AM Nico Byers MD [facility]        Today's Diagnoses     Rheumatoid arthritis involving multiple sites with positive rheumatoid factor (H)    -  1      Care Instructions    Rheumatology    Dr. Nico Byers         [facility]   (Monday)  [address]  [place] [address]       [facility]   (Tuesday)  [address] [place] [address]    [facility]   (Wed., Thurs., and Friday)  [address] [address] [facility] [place] [address]    Phone number: [phone]  Thank you for choosing [place].  Samantha Morales CMA            Follow-ups after your visit        Your next 10 appointments already scheduled     Dec 10, 2018 11:00 AM CST   LAB with  LAB   [facility] ([facility])    [address] [id number] [phone]           Please do not eat 10-12 hours before your appointment if you are coming in fasting for labs on lipids, cholesterol, or glucose (sugar). This does not apply to pregnant women. Water, hot tea and black coffee (with nothing added) are okay. Do not drink other fluids, diet soda or chew gum.            Dec 12, 2018 11:40 AM CST   Return Visit with Nico Byers MD   [facility] ([facility])    [address] [id number] [phone]              Future tests that were ordered for you today     Open Future Orders        Priority Expected Expires Ordered    CBC with platelets differential Routine 11/2/2018 12/6/2018 8/8/2018    Creatinine Routine 11/2/2018 12/6/2018 8/8/2018    Erythrocyte sedimentation rate auto Routine 11/2/2018 12/6/2018 8/8/2018    CRP inflammation Routine 11/2/2018 12/6/2018 8/8/2018    Hepatic panel Routine 11/2/2018 12/6/2018  8/8/2018            Who to contact     If you have questions or need follow up information about today's clinic visit or your schedule please contact [facility] directly at [phone].  Normal or non-critical lab and imaging results will be communicated to you by MyChart, letter or phone within 4 business days after the clinic has received the results. If you do not hear from us within 7 days, please contact the clinic through MyChart or phone. If you have a critical or abnormal lab result, we will notify you by phone as soon as possible.  Submit refill requests through [employer] or call your pharmacy and they will forward the refill request to us. Please allow 3 business days for your refill to be completed.          Additional Information About Your Visit        [facility] Hospital[facility] Information     [employer] gives you secure access to your electronic health record. If you see a primary care provider, you can also send messages to your care team and make appointments. If you have questions, please call your primary care clinic.  If you do not have a primary care provider, please call [phone] and they will assist you.        Care EveryWhere ID     This is your Care EveryWhere ID. This could be used by other organizations to access your [facility] medical records  [id number]        Your Vitals Were     Pulse Temperature Pulse Oximetry BMI (Body Mass Index)          96 96  F (35.6  C) 96% 29.63 kg/m2         Blood Pressure from Last 3 Encounters:   08/08/18 123/79   06/27/18 127/76   06/22/18 120/76    Weight from Last 3 Encounters:   08/08/18 87.1 kg (192 lb)   06/27/18 85.3 kg (188 lb)   06/22/18 83.9 kg (185 lb)                 Today's Medication Changes          These changes are accurate as of 8/8/18 11:34 AM.  If you have any questions, ask your nurse or doctor.               Start taking these medicines.        Dose/Directions    hydroxychloroquine 200 MG tablet   Commonly known as:  PLAQUENIL   Used for:   Rheumatoid arthritis involving multiple sites with positive rheumatoid factor (H)   Started by:  Nico Byers MD        Dose:  200 mg   Take 1 tablet (200 mg) by mouth 2 times daily   Quantity:  60 tablet   Refills:  4         These medicines have changed or have updated prescriptions.        Dose/Directions    methotrexate 2.5 MG tablet   This may have changed:  how much to take   Used for:  Rheumatoid arthritis involving multiple sites with positive rheumatoid factor (H)   Changed by:  Nico Byers MD        Dose:  25 mg   Take 10 tablets (25 mg) by mouth once a week   Quantity:  120 tablet   Refills:  1            Where to get your medicines      These medications were sent to [facility] [place] - [address] [address] [facility] [place]  [address] [address], [place] [address]     Phone:  [phone]     hydroxychloroquine 200 MG tablet    methotrexate 2.5 MG tablet    tofacitinib 11 MG 24 hr tablet                Primary Care Provider Office Phone # Fax #    Georgia Ju Vázquez [phone] [address] [facility] [address]        Equal Access to Services     [facility]: Hadii jonah grant hadasho Soomaali, waaxda luqadaha, qaybta kaalmada adeegyarenetta, aj hanks . So [facility] [phone].    ATENCIÓN: Si habla español, tiene a herrera disposición servicios gratuitos de asistencia lingüística. Llame al [phone].    We comply with applicable federal civil rights laws and [place] laws. We do not discriminate on the basis of race, color, national origin, age, disability, sex, sexual orientation, or gender identity.            Thank you!     Thank you for choosing [facility]  for your care. Our goal is always to provide you with excellent care. Hearing back from our patients is one way we can continue to improve our services. Please take a few minutes to complete the written survey that you may receive in the mail after your visit  with us. Thank you!             Your Updated Medication List - Protect others around you: Learn how to safely use, store and throw away your medicines at www.disposemymeds.org.          This list is accurate as of 8/8/18 11:34 AM.  Always use your most recent med list.                   Brand Name Dispense Instructions for use Diagnosis    ascorbic acid 1000 MG Tabs tablet      Take 1 tablet by mouth 3 times daily        aspirin 81 MG tablet      Take 1 tablet by mouth daily.        atorvastatin 40 MG tablet    LIPITOR    90 tablet    Take 1 tablet (40 mg) by mouth daily    Hyperlipidemia LDL goal <100       CALCIUM CITRATE + PO      Take 2 tablets by mouth daily        cyanocobalamin 500 MCG Tabs      Take 1 tablet by mouth 2 times daily        cyclobenzaprine 10 MG tablet    FLEXERIL    60 tablet    Take 1 tablet (10 mg) by mouth 2 times daily as needed for muscle spasms    S/P lumbar fusion       ferrous gluconate 324 (38 Fe) MG tablet    FERGON    270 tablet    Take 1 tablet (324 mg) by mouth daily (with breakfast)    Low iron       FOLIC ACID PO      Take 800 mcg by mouth daily        hydroxychloroquine 200 MG tablet    PLAQUENIL    60 tablet    Take 1 tablet (200 mg) by mouth 2 times daily    Rheumatoid arthritis involving multiple sites with positive rheumatoid factor (H)       ipratropium 0.06 % spray    ATROVENT    1 Box    Spray 2 sprays into both nostrils 4 times daily as needed    Chronic vasomotor rhinitis       magnesium oxide 400 (241.3 Mg) MG tablet    MAG-OX     Take 1 tablet (400 mg) by mouth daily        methotrexate 2.5 MG tablet     120 tablet    Take 10 tablets (25 mg) by mouth once a week    Rheumatoid arthritis involving multiple sites with positive rheumatoid factor (H)       Multi-vitamin Tabs tablet      Take 1 tablet by mouth daily        OMEGA-3 FISH OIL PO      Take 2,000 mg by mouth daily        order for [facility]      Respironics REMSTAR 60 Series Auto CPAP 12-15 cm H2O, Wisp nasal mask  w/a s/m cushion        pantoprazole 40 MG EC tablet    PROTONIX    90 tablet    TAKE ONE TABLET BY MOUTH EVERY DAY 30-60 MINUTES BEFORE A MEAL    Gastroesophageal reflux disease without esophagitis       predniSONE 1 MG tablet    DELTASONE    5 tablet    Take 20 tablets (20 mg) by mouth as needed    RA (rheumatoid arthritis) (H)       tofacitinib 11 MG 24 hr tablet    XELJANZ XR    90 tablet    Take 1 tablet (11 mg) by mouth daily    Rheumatoid arthritis involving multiple sites with positive rheumatoid factor (H)       TYLENOL 500 MG tablet   Generic drug:  acetaminophen     100 tablet    Take 2 tablets (1,000 mg) by mouth every 8 hours as needed for pain        vitamin B complex with vitamin C Tabs tablet      Take 1 tablet by mouth daily.        VITAMIN D-3 PO      Take 1,000 Units by mouth 2 times daily        VITAMIN E PO      Take 2,000 Units by mouth daily

## 2018-08-08 NOTE — PATIENT INSTRUCTIONS
Rheumatology    Dr. Nico Byers         [facility]   (Monday)  [address]  [place] [address]       [facility]   (Tuesday)  [address]  [place] [address]    [facility]   (Wed., Thurs., and Friday)  [address] [place] [address]    Phone number: [phone]  Thank you for choosing [place].  Samantha Morales CMA

## 2018-11-15 ENCOUNTER — OFFICE VISIT (OUTPATIENT)
Dept: NEUROSURGERY | Facility: CLINIC | Age: 78
End: 2018-11-15
Payer: COMMERCIAL

## 2018-11-15 VITALS
DIASTOLIC BLOOD PRESSURE: 93 MMHG | OXYGEN SATURATION: 93 % | HEART RATE: 86 BPM | SYSTOLIC BLOOD PRESSURE: 147 MMHG | TEMPERATURE: 98.4 F | WEIGHT: 192 LBS | BODY MASS INDEX: 29.63 KG/M2

## 2018-11-15 DIAGNOSIS — M54.6 PAIN IN THORACIC SPINE: Primary | ICD-10-CM

## 2018-11-15 PROCEDURE — 99213 OFFICE O/P EST LOW 20 MIN: CPT | Performed by: NURSE PRACTITIONER

## 2018-11-15 ASSESSMENT — PAIN SCALES - GENERAL: PAINLEVEL: MILD PAIN (2)

## 2018-11-15 NOTE — NURSING NOTE
"Ginger Marshall is a 78 year old female who presents for:  Chief Complaint   Patient presents with     Neurologic Problem     follow up lumbar radiculopathy, post lumbar fusion 4-3-17        Vitals:    Vitals:    11/15/18 1303   BP: (!) 147/93   BP Location: Left arm   Patient Position: Sitting   Cuff Size: Adult Regular   Pulse: 86   Temp: 98.4  F (36.9  C)   TempSrc: Oral   SpO2: 93%   Weight: 192 lb (87.1 kg)       BMI:  Estimated body mass index is 29.63 kg/(m^2) as calculated from the following:    Height as of 6/27/18: 5' 7.5\" (1.715 m).    Weight as of this encounter: 192 lb (87.1 kg).    Pain Score:  Mild Pain (2)        Sujatha Cambpell CMA, AAS      "

## 2018-11-15 NOTE — PATIENT INSTRUCTIONS
-Schedule thoracic MRI  -We will call you with results  -Please contact the clinic if pain persists at [phone].      Patient to follow up with Primary Care provider regarding elevated blood pressure.

## 2018-11-15 NOTE — PROGRESS NOTES
[facility]  Neurosurgery followup:    HPI: Ginger Marshall is a 78 year old female who is s/p L3 Brewer-Joe osteotomy with L3-4 TLIF with Dr. Anthony Michele on 4/3/17.  She is here today with complaints of mid-thoracic pain.  She states it seemed to begin after her surgery.  She describes her low back pain and LLE pain improved since surgery.  She continues to have left lateral thigh pain, but only at night and it's minimal compared to prior to surgery.  She states she has pain to the upper/mid back.  It is a constant ache that increases with activities such as walking, reaching and doing the dishes.  She states it is becoming more bothersome.  She finds little to relieve the pain.  She denies weakness to extremities or falls.  She denies changes to bowel or bladder.     Exam:  Constitutional:  Alert, well nourished, NAD.  HEENT: Normocephalic, atraumatic.   Pulm:  Without shortness of breath   CV:  No pitting edema of BLE.      Neurological:  Awake  Alert  Oriented x 3  Motor exam:        IP  Q    DF PF EHL  R   5   4+   5   5    5  L   5    5      5   5    5     Able to spontaneously move L/E bilaterally  Sensation intact throughout all L/E dermatomes    Cervical examination with tenderness to right paraspinous muscles.     Examination of thoracic spine with mild tenderness to spine midline at about T5, 6, 7, 8 levels.  Minimal paraspinous tenderness.    Examination of lumbar spine with mild tenderness to spine and paraspinous muscles Lt>Rt.     Imaging: None of thoracic spine    A/P:   Pain in thoracic spine  S/p lumbar fusion      Ginger Marshall is a 78 year old female who is s/p L3 Brewer-Joe osteotomy with L3-4 TLIF with Dr. Anthony Michele on 4/3/17.  She is here today with complaints of mid-thoracic pain.  She states it seemed to begin after her surgery.  She describes her low back pain and LLE pain improved since surgery.  She continues to have left lateral thigh pain, but only at night and  it's minimal compared to prior to surgery.  She states she has pain to the upper/mid back.  It is a constant ache that increases with activities such as walking, reaching and doing the dishes.  She states it is becoming more bothersome.  She finds little to relieve the pain.  She denies weakness to extremities or falls.  She denies changes to bowel or bladder.  We are going to order a thoracic MRI and the patient is agreeable.  We will contact her once results reviewed with recommendations.     Patient Instructions   -Schedule thoracic MRI  -We will call you with results  -Please contact the clinic if pain persists at [phone].      Patient to follow up with Primary Care provider regarding elevated blood pressure.      Carmen Fierro [facility] [employer] [facility]  [facility]  [address] [address]    Tel [phone]  Pager [phone]

## 2018-11-15 NOTE — LETTER
11/15/2018         RE: Ginger Marshall  [address]  [place] [address]        Dear Colleague,    Thank you for referring your patient, Ginger Marshall, to the [facility]. Please see a copy of my visit note below.    [facility]  Neurosurgery followup:    HPI: Ginger Marshall is a 78 year old female who is s/p L3 Brewer-Joe osteotomy with L3-4 TLIF with Dr. Anthony Michele on 4/3/17.  She is here today with complaints of mid-thoracic pain.  She states it seemed to begin after her surgery.  She describes her low back pain and LLE pain improved since surgery.  She continues to have left lateral thigh pain, but only at night and it's minimal compared to prior to surgery.  She states she has pain to the upper/mid back.  It is a constant ache that increases with activities such as walking, reaching and doing the dishes.  She states it is becoming more bothersome.  She finds little to relieve the pain.  She denies weakness to extremities or falls.  She denies changes to bowel or bladder.     Exam:  Constitutional:  Alert, well nourished, NAD.  HEENT: Normocephalic, atraumatic.   Pulm:  Without shortness of breath   CV:  No pitting edema of BLE.      Neurological:  Awake  Alert  Oriented x 3  Motor exam:        IP  Q    DF PF EHL  R   5   4+   5   5    5  L   5    5      5   5    5     Able to spontaneously move L/E bilaterally  Sensation intact throughout all L/E dermatomes    Cervical examination with tenderness to right paraspinous muscles.     Examination of thoracic spine with mild tenderness to spine midline at about T5, 6, 7, 8 levels.  Minimal paraspinous tenderness.    Examination of lumbar spine with mild tenderness to spine and paraspinous muscles Lt>Rt.     Imaging: None of thoracic spine    A/P:   Pain in thoracic spine  S/p lumbar fusion      Ginger Marshall is a 78 year old female who is s/p L3 Brewer-Joe osteotomy with L3-4 TLIF with Dr. Anthony Michele  on 4/3/17.  She is here today with complaints of mid-thoracic pain.  She states it seemed to begin after her surgery.  She describes her low back pain and LLE pain improved since surgery.  She continues to have left lateral thigh pain, but only at night and it's minimal compared to prior to surgery.  She states she has pain to the upper/mid back.  It is a constant ache that increases with activities such as walking, reaching and doing the dishes.  She states it is becoming more bothersome.  She finds little to relieve the pain.  She denies weakness to extremities or falls.  She denies changes to bowel or bladder.  We are going to order a thoracic MRI and the patient is agreeable.  We will contact her once results reviewed with recommendations.     Patient Instructions   -Schedule thoracic MRI  -We will call you with results  -Please contact the clinic if pain persists at [phone].      Patient to follow up with Primary Care provider regarding elevated blood pressure.      Carmen Fierro CNP  [facility]  [address] [address]    Tel [phone]  Pager [phone]      Again, thank you for allowing me to participate in the care of your patient.        Sincerely,        Carmen Fierro, CHUCHO

## 2018-11-15 NOTE — MR AVS SNAPSHOT
After Visit Summary   11/15/2018    Ginger Marshall    MRN: [id number]           Patient Information     Date Of Birth          1940        Visit Information        Provider Department      11/15/2018 1:00 PM Carmen Fierro NP [facility] [place]        Today's Diagnoses     Pain in thoracic spine    -  1      Care Instructions    -Schedule thoracic MRI  -We will call you with results  -Please contact the clinic if pain persists at [phone].      Patient to follow up with Primary Care provider regarding elevated blood pressure.            Follow-ups after your visit        Your next 10 appointments already scheduled     Dec 10, 2018 11:00 AM CST   LAB with  LAB   [facility] [place] ([facility])    [address]  [place] [address] [phone]           Please do not eat 10-12 hours before your appointment if you are coming in fasting for labs on lipids, cholesterol, or glucose (sugar). This does not apply to pregnant women. Water, hot tea and black coffee (with nothing added) are okay. Do not drink other fluids, diet soda or chew gum.            Dec 12, 2018 11:40 AM CST   Return Visit with Nico Byers MD   [facility] [place] ([facility])    [address]  [place] [address] [phone]              Future tests that were ordered for you today     Open Future Orders        Priority Expected Expires Ordered    MR Thoracic Spine w/o Contrast Routine  11/15/2019 11/15/2018            Who to contact     If you have questions or need follow up information about today's clinic visit or your schedule please contact [facility] [place] directly at [phone].  Normal or non-critical lab and imaging results will be communicated to you by MyChart, letter or phone within 4 business days after the clinic has received the results. If you do not hear from us within 7 days, please contact the clinic through Fix8[employer] or  phone. If you have a critical or abnormal lab result, we will notify you by phone as soon as possible.  Submit refill requests through [employer] or call your pharmacy and they will forward the refill request to us. Please allow 3 business days for your refill to be completed.          Additional Information About Your Visit        [employer] Information     [employer] gives you secure access to your electronic health record. If you see a primary care provider, you can also send messages to your care team and make appointments. If you have questions, please call your primary care clinic.  If you do not have a primary care provider, please call [phone] and they will assist you.        Care EveryWhere ID     This is your Care EveryWhere ID. This could be used by other organizations to access your [facility] medical records  [id number]        Your Vitals Were     Pulse Temperature Pulse Oximetry Breastfeeding? BMI (Body Mass Index)       86 98.4  F (36.9  C) (Oral) 93% No 29.63 kg/m2        Blood Pressure from Last 3 Encounters:   11/15/18 (!) 147/93   08/08/18 123/79   06/27/18 127/76    Weight from Last 3 Encounters:   11/15/18 192 lb (87.1 kg)   08/08/18 192 lb (87.1 kg)   06/27/18 188 lb (85.3 kg)               Primary Care Provider Office Phone # Fax #    Georgia Vázquez [phone] [address] [facility] [address]        Equal Access to Services     [facility]: Hadii jonah ku hadasho Soomaali, waaxda luqadaha, qaybta kaalmada adeegyada, aj hanks . So [facility] [phone].    ATENCIÓN: Si habla español, tiene a herrera disposición servicios gratuitos de asistencia lingüística. Llyee al [phone].    We comply with applicable federal civil rights laws and [place] laws. We do not discriminate on the basis of race, color, national origin, age, disability, sex, sexual orientation, or gender identity.            Thank you!     Thank you for choosing [facility]  for your care. Our goal is always to provide you with excellent care. Hearing back from our patients is one way we can continue to improve our services. Please take a few minutes to complete the written survey that you may receive in the mail after your visit with us. Thank you!             Your Updated Medication List - Protect others around you: Learn how to safely use, store and throw away your medicines at www.disposemymeds.org.          This list is accurate as of 11/15/18  1:17 PM.  Always use your most recent med list.                   Brand Name Dispense Instructions for use Diagnosis    ascorbic acid 1000 MG Tabs tablet      Take 1 tablet by mouth 3 times daily        aspirin 81 MG tablet      Take 1 tablet by mouth daily.        atorvastatin 40 MG tablet    LIPITOR    90 tablet    Take 1 tablet (40 mg) by mouth daily    Hyperlipidemia LDL goal <100       CALCIUM CITRATE + PO      Take 2 tablets by mouth daily        cyanocobalamin 500 MCG Tabs      Take 1 tablet by mouth 2 times daily        cyclobenzaprine 10 MG tablet    FLEXERIL    60 tablet    Take 1 tablet (10 mg) by mouth 2 times daily as needed for muscle spasms    S/P lumbar fusion       ferrous gluconate 324 (38 Fe) MG tablet    FERGON    270 tablet    Take 1 tablet (324 mg) by mouth daily (with breakfast)    Low iron       FOLIC ACID PO      Take 800 mcg by mouth daily        hydroxychloroquine 200 MG tablet    PLAQUENIL    60 tablet    Take 1 tablet (200 mg) by mouth 2 times daily    Rheumatoid arthritis involving multiple sites with positive rheumatoid factor (H)       ipratropium 0.06 % spray    ATROVENT    1 Box    Spray 2 sprays into both nostrils 4 times daily as needed    Chronic vasomotor rhinitis       magnesium oxide 400 (241.3 Mg) MG tablet    MAG-OX     Take 1 tablet (400 mg) by mouth daily        methotrexate 2.5 MG tablet     120 tablet    Take 10 tablets (25 mg) by mouth once a week    Rheumatoid arthritis  involving multiple sites with positive rheumatoid factor (H)       Multi-vitamin Tabs tablet      Take 1 tablet by mouth daily        OMEGA-3 FISH OIL PO      Take 2,000 mg by mouth daily        order for DME      Respironics REMSTAR 60 Series Auto CPAP 12-15 cm H2O, Wisp nasal mask w/a s/m cushion        pantoprazole 40 MG EC tablet    PROTONIX    90 tablet    TAKE ONE TABLET BY MOUTH EVERY DAY 30-60 MINUTES BEFORE A MEAL    Gastroesophageal reflux disease without esophagitis       predniSONE 1 MG tablet    DELTASONE    5 tablet    Take 20 tablets (20 mg) by mouth as needed    RA (rheumatoid arthritis) (H)       tofacitinib 11 MG 24 hr tablet    XELJANZ XR    90 tablet    Take 1 tablet (11 mg) by mouth daily    Rheumatoid arthritis involving multiple sites with positive rheumatoid factor (H)       TYLENOL 500 MG tablet   Generic drug:  acetaminophen     100 tablet    Take 2 tablets (1,000 mg) by mouth every 8 hours as needed for pain        vitamin B complex with vitamin C Tabs tablet      Take 1 tablet by mouth daily.        VITAMIN D-3 PO      Take 1,000 Units by mouth 2 times daily        VITAMIN E PO      Take 2,000 Units by mouth daily

## 2018-11-20 ENCOUNTER — RADIANT APPOINTMENT (OUTPATIENT)
Dept: MRI IMAGING | Facility: CLINIC | Age: 78
End: 2018-11-20
Attending: NURSE PRACTITIONER
Payer: COMMERCIAL

## 2018-11-20 DIAGNOSIS — M54.6 PAIN IN THORACIC SPINE: ICD-10-CM

## 2018-11-20 PROCEDURE — 72146 MRI CHEST SPINE W/O DYE: CPT | Mod: TC

## 2018-11-23 ENCOUNTER — OFFICE VISIT (OUTPATIENT)
Dept: FAMILY MEDICINE | Facility: CLINIC | Age: 78
End: 2018-11-23
Payer: COMMERCIAL

## 2018-11-23 VITALS
TEMPERATURE: 97 F | HEART RATE: 98 BPM | DIASTOLIC BLOOD PRESSURE: 74 MMHG | WEIGHT: 198 LBS | RESPIRATION RATE: 20 BRPM | OXYGEN SATURATION: 95 % | SYSTOLIC BLOOD PRESSURE: 117 MMHG | BODY MASS INDEX: 30.01 KG/M2 | HEIGHT: 68 IN

## 2018-11-23 DIAGNOSIS — D50.8 IRON DEFICIENCY ANEMIA REFRACTORY TO IRON THERAPY: ICD-10-CM

## 2018-11-23 DIAGNOSIS — R03.0 ELEVATED BP WITHOUT DIAGNOSIS OF HYPERTENSION: ICD-10-CM

## 2018-11-23 DIAGNOSIS — R51.9 ACUTE INTRACTABLE HEADACHE, UNSPECIFIED HEADACHE TYPE: ICD-10-CM

## 2018-11-23 DIAGNOSIS — R42 DIZZINESS: Primary | ICD-10-CM

## 2018-11-23 DIAGNOSIS — E78.5 HYPERLIPIDEMIA LDL GOAL <100: ICD-10-CM

## 2018-11-23 LAB
ALBUMIN SERPL-MCNC: 3.9 G/DL (ref 3.4–5)
ALP SERPL-CCNC: 55 U/L (ref 40–150)
ALT SERPL W P-5'-P-CCNC: 33 U/L (ref 0–50)
ANION GAP SERPL CALCULATED.3IONS-SCNC: 5 MMOL/L (ref 3–14)
AST SERPL W P-5'-P-CCNC: 29 U/L (ref 0–45)
BASOPHILS # BLD AUTO: 0 10E9/L (ref 0–0.2)
BASOPHILS NFR BLD AUTO: 0.2 %
BILIRUB SERPL-MCNC: 0.3 MG/DL (ref 0.2–1.3)
BUN SERPL-MCNC: 14 MG/DL (ref 7–30)
CALCIUM SERPL-MCNC: 9.2 MG/DL (ref 8.5–10.1)
CHLORIDE SERPL-SCNC: 107 MMOL/L (ref 94–109)
CO2 SERPL-SCNC: 29 MMOL/L (ref 20–32)
CREAT SERPL-MCNC: 0.59 MG/DL (ref 0.52–1.04)
DIFFERENTIAL METHOD BLD: NORMAL
EOSINOPHIL # BLD AUTO: 0.4 10E9/L (ref 0–0.7)
EOSINOPHIL NFR BLD AUTO: 6 %
ERYTHROCYTE [DISTWIDTH] IN BLOOD BY AUTOMATED COUNT: 14.1 % (ref 10–15)
FERRITIN SERPL-MCNC: 47 NG/ML (ref 8–252)
GFR SERPL CREATININE-BSD FRML MDRD: >90 ML/MIN/1.7M2
GLUCOSE SERPL-MCNC: 123 MG/DL (ref 70–99)
HCT VFR BLD AUTO: 38.4 % (ref 35–47)
HGB BLD-MCNC: 12.3 G/DL (ref 11.7–15.7)
IRON SATN MFR SERPL: 43 % (ref 15–46)
IRON SERPL-MCNC: 150 UG/DL (ref 35–180)
LDLC SERPL DIRECT ASSAY-MCNC: 72 MG/DL
LYMPHOCYTES # BLD AUTO: 1.5 10E9/L (ref 0.8–5.3)
LYMPHOCYTES NFR BLD AUTO: 25.6 %
MCH RBC QN AUTO: 31.6 PG (ref 26.5–33)
MCHC RBC AUTO-ENTMCNC: 32 G/DL (ref 31.5–36.5)
MCV RBC AUTO: 99 FL (ref 78–100)
MONOCYTES # BLD AUTO: 0.8 10E9/L (ref 0–1.3)
MONOCYTES NFR BLD AUTO: 13.4 %
NEUTROPHILS # BLD AUTO: 3.2 10E9/L (ref 1.6–8.3)
NEUTROPHILS NFR BLD AUTO: 54.8 %
PLATELET # BLD AUTO: 361 10E9/L (ref 150–450)
POTASSIUM SERPL-SCNC: 3.9 MMOL/L (ref 3.4–5.3)
PROT SERPL-MCNC: 7.6 G/DL (ref 6.8–8.8)
RBC # BLD AUTO: 3.89 10E12/L (ref 3.8–5.2)
SODIUM SERPL-SCNC: 141 MMOL/L (ref 133–144)
TIBC SERPL-MCNC: 351 UG/DL (ref 240–430)
TSH SERPL DL<=0.005 MIU/L-ACNC: 0.81 MU/L (ref 0.4–4)
WBC # BLD AUTO: 5.8 10E9/L (ref 4–11)

## 2018-11-23 PROCEDURE — 85025 COMPLETE CBC W/AUTO DIFF WBC: CPT | Performed by: NURSE PRACTITIONER

## 2018-11-23 PROCEDURE — 83721 ASSAY OF BLOOD LIPOPROTEIN: CPT | Performed by: NURSE PRACTITIONER

## 2018-11-23 PROCEDURE — 99214 OFFICE O/P EST MOD 30 MIN: CPT | Performed by: NURSE PRACTITIONER

## 2018-11-23 PROCEDURE — 83550 IRON BINDING TEST: CPT | Performed by: NURSE PRACTITIONER

## 2018-11-23 PROCEDURE — 80053 COMPREHEN METABOLIC PANEL: CPT | Performed by: NURSE PRACTITIONER

## 2018-11-23 PROCEDURE — 82728 ASSAY OF FERRITIN: CPT | Performed by: NURSE PRACTITIONER

## 2018-11-23 PROCEDURE — 84443 ASSAY THYROID STIM HORMONE: CPT | Performed by: NURSE PRACTITIONER

## 2018-11-23 PROCEDURE — 83540 ASSAY OF IRON: CPT | Performed by: NURSE PRACTITIONER

## 2018-11-23 PROCEDURE — 36415 COLL VENOUS BLD VENIPUNCTURE: CPT | Performed by: NURSE PRACTITIONER

## 2018-11-23 RX ORDER — IBUPROFEN 200 MG
200 TABLET ORAL EVERY 4 HOURS PRN
COMMUNITY
End: 2021-03-11

## 2018-11-23 ASSESSMENT — PAIN SCALES - GENERAL: PAINLEVEL: MILD PAIN (3)

## 2018-11-23 ASSESSMENT — PATIENT HEALTH QUESTIONNAIRE - PHQ9: SUM OF ALL RESPONSES TO PHQ QUESTIONS 1-9: 4

## 2018-11-23 NOTE — MR AVS SNAPSHOT
After Visit Summary   11/23/2018    Ginger Marshall    MRN: [id number]           Patient Information     Date Of Birth          1940        Visit Information        Provider Department      11/23/2018 12:20 PM Dahiana Flores APRN [facility]        Today's Diagnoses     Dizziness    -  1    Elevated BP without diagnosis of hypertension        Acute intractable headache, unspecified headache type        Hyperlipidemia LDL goal <100        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          Care Instructions    Please schedule open MRI at [facility] [address] [facility], [place] [address] [phone]  [facility]    If you have any questions regarding to your visit please contact your care team:     Team Pink:   Clinic Hours Telephone Number   Internal Medicine:  Dr. Georgia Flores, NP 7am-7pm  Monday - Thursday   7am-5pm  Fridays  [phone]  (Appointment scheduling available 24/7)   Urgent Care - [place] [facility] - 11am-9pm Monday-Friday Saturday-Sunday- 9am-5pm   [place] - 5pm-9pm Monday-Friday Saturday-Sunday- 9am-5pm  [phone] - [place]  [phone] - [place]       What options do I have for a visit other than an office visit? We offer electronic visits (e-visits) and telephone visits, when medically appropriate.  Please check with your medical insurance to see if these types of visits are covered, as you will be responsible for any charges that are not paid by your insurance.      You can use [employer] (secure electronic communication) to access to your chart, send your primary care provider a message, or make an appointment. Ask a team member how to get started.     For a price quote for your services, please call our Consumer Price Line at [phone] or our Imaging Cost estimation line at [phone] (for imaging tests).  Charline Alves MA            Follow-ups after your visit         Follow-up notes from your care team     Return in about 2 weeks (around 12/7/2018) for If no improvement in symptoms.      Your next 10 appointments already scheduled     Dec 10, 2018 11:00 AM CST   LAB with [facility]   [facility] ([facility])    [address] [facility]  [place] [address]   [phone]           Please do not eat 10-12 hours before your appointment if you are coming in fasting for labs on lipids, cholesterol, or glucose (sugar). This does not apply to pregnant women. Water, hot tea and black coffee (with nothing added) are okay. Do not drink other fluids, diet soda or chew gum.            Dec 12, 2018 11:40 AM CST   Return Visit with Nico Byers MD   [facility] ([facility])    [address] [facility]  [place] [address]   [phone]              Future tests that were ordered for you today     Open Future Orders        Priority Expected Expires Ordered    MR Brain w/o Contrast Routine  11/23/2019 11/23/2018            Who to contact     If you have questions or need follow up information about today's clinic visit or your schedule please contact [facility] directly at [phone].  Normal or non-critical lab and imaging results will be communicated to you by MessageGearshart, letter or phone within 4 business days after the clinic has received the results. If you do not hear from us within 7 days, please contact the clinic through MessageGearshart or phone. If you have a critical or abnormal lab result, we will notify you by phone as soon as possible.  Submit refill requests through Frazr or call your pharmacy and they will forward the refill request to us. Please allow 3 business days for your refill to be completed.          Additional Information About Your Visit        Frazr Information     Frazr gives you secure access to your electronic health record. If you see a primary care provider, you can also send messages to your  "care team and make appointments. If you have questions, please call your primary care clinic.  If you do not have a primary care provider, please call [phone] and they will assist you.        Care EveryWhere ID     This is your Care EveryWhere ID. This could be used by other organizations to access your [facility] medical records  [id number]        Your Vitals Were     Pulse Temperature Respirations Height Pulse Oximetry BMI (Body Mass Index)    98 97  F (36.1  C) (Oral) 20 5' 7.5\" (1.715 m) 95% 30.55 kg/m2       Blood Pressure from Last 3 Encounters:   11/23/18 117/74   11/15/18 (!) 147/93   08/08/18 123/79    Weight from Last 3 Encounters:   11/23/18 198 lb (89.8 kg)   11/15/18 192 lb (87.1 kg)   08/08/18 192 lb (87.1 kg)              We Performed the Following     CBC with platelets differential     Comprehensive metabolic panel     Ferritin     Iron and iron binding capacity     LDL cholesterol direct     TSH with free T4 reflex        Primary Care Provider Office Phone # Fax #    Georgia Vázquez [phone] [address] [facility] [address]        Equal Access to Services     SHANIQUE WATSON : Hadii aad ku hadasho Soomaali, waaxda luqadaha, qaybta kaalmada adeegyada, aj fernandez. So [facility] [phone].    ATENCIÓN: Si habla español, tiene a herrera disposición servicios gratuitos de asistencia lingüística. Llame al [phone].    We comply with applicable federal civil rights laws and [place] laws. We do not discriminate on the basis of race, color, national origin, age, disability, sex, sexual orientation, or gender identity.            Thank you!     Thank you for choosing [facility]  for your care. Our goal is always to provide you with excellent care. Hearing back from our patients is one way we can continue to improve our services. Please take a few minutes to complete the written survey that you may receive in the mail after your " visit with us. Thank you!             Your Updated Medication List - Protect others around you: Learn how to safely use, store and throw away your medicines at www.disposemymeds.org.          This list is accurate as of 11/23/18  1:06 PM.  Always use your most recent med list.                   Brand Name Dispense Instructions for use Diagnosis    ADVIL 200 MG tablet   Generic drug:  ibuprofen      Take 200 mg by mouth every 4 hours as needed for mild pain        ascorbic acid 1000 MG Tabs tablet      Take 1 tablet by mouth 3 times daily        aspirin 81 MG tablet    ASA     Take 1 tablet by mouth daily.        atorvastatin 40 MG tablet    LIPITOR    90 tablet    Take 1 tablet (40 mg) by mouth daily    Hyperlipidemia LDL goal <100       CALCIUM CITRATE + PO      Take 2 tablets by mouth daily        cyanocobalamin 500 MCG Tabs      Take 1 tablet by mouth 2 times daily        cyclobenzaprine 10 MG tablet    FLEXERIL    60 tablet    Take 1 tablet (10 mg) by mouth 2 times daily as needed for muscle spasms    S/P lumbar fusion       ferrous gluconate 324 (38 Fe) MG tablet    FERGON    270 tablet    Take 1 tablet (324 mg) by mouth daily (with breakfast)    Low iron       FOLIC ACID PO      Take 800 mcg by mouth daily        hydroxychloroquine 200 MG tablet    PLAQUENIL    60 tablet    Take 1 tablet (200 mg) by mouth 2 times daily    Rheumatoid arthritis involving multiple sites with positive rheumatoid factor (H)       ipratropium 0.06 % spray    ATROVENT    1 Box    Spray 2 sprays into both nostrils 4 times daily as needed    Chronic vasomotor rhinitis       magnesium oxide 400 (241.3 Mg) MG tablet    MAG-OX     Take 1 tablet (400 mg) by mouth daily        methotrexate 2.5 MG tablet     120 tablet    Take 10 tablets (25 mg) by mouth once a week    Rheumatoid arthritis involving multiple sites with positive rheumatoid factor (H)       Multi-vitamin Tabs tablet      Take 1 tablet by mouth daily        OMEGA-3 FISH OIL PO       Take 2,000 mg by mouth daily        order for DME      Respironics REMSTAR 60 Series Auto CPAP 12-15 cm H2O, Wisp nasal mask w/a s/m cushion        pantoprazole 40 MG EC tablet    PROTONIX    90 tablet    TAKE ONE TABLET BY MOUTH EVERY DAY 30-60 MINUTES BEFORE A MEAL    Gastroesophageal reflux disease without esophagitis       predniSONE 1 MG tablet    DELTASONE    5 tablet    Take 20 tablets (20 mg) by mouth as needed    RA (rheumatoid arthritis) (H)       tofacitinib 11 MG 24 hr tablet    XELJANZ XR    90 tablet    Take 1 tablet (11 mg) by mouth daily    Rheumatoid arthritis involving multiple sites with positive rheumatoid factor (H)       TYLENOL 500 MG tablet   Generic drug:  acetaminophen     100 tablet    Take 2 tablets (1,000 mg) by mouth every 8 hours as needed for pain        vitamin B complex with vitamin C Tabs tablet      Take 1 tablet by mouth daily.        VITAMIN D-3 PO      Take 1,000 Units by mouth 2 times daily        VITAMIN E PO      Take 2,000 Units by mouth daily

## 2018-11-23 NOTE — PROGRESS NOTES
SUBJECTIVE:   Ginger Marshall is a 78 year old female who presents to clinic today for the following health issues:      Chief Complaint   Patient presents with     Hypertension     Dizziness     Headache     Patient notes hypertension at recent Neurosurgery appointment.  She notes dizziness with getting out of bed in the am.  If she sits and rests, dizziness resolves.  Symptoms started 6-8 weeks ago.  She notes a spinning sensation.  Patient notes worsening tinnitus last week.  She denies hearing loss.  She denies nasal congestion, ear pain, sore throat, cough.  She denies vision changes, weakness.  She does note a headache for the past 1.5 weeks.  Pain can be severe and covers her whole head.  She denies neck pain.  Pain is throbbing.  Tylenol does not help.  Aleve has helped some.  Patient denies chest pain, shortness of breath, melena, hematochezia.  Patient started on plaquenil 4 months ago.        Problem list and histories reviewed & adjusted, as indicated.  Additional history: as documented    Patient Active Problem List   Diagnosis     RA (Rheumatoid Arthritis) off Plaquenil     Menopause     History of colonic polyps     Mitral Regurgitation     Multinodular goiter     CARDIOVASCULAR SCREENING; LDL GOAL LESS THAN 130     Psychophysiologic insomnia     Pulmonary nodule     PSEUDOPHAKIA OU     PVD (POSTERIOR VITREOUS DETACHMENT) OU     PXF (PSEUDOEXFOLIATION OF LENS CAPSULE) OD     GERD (gastroesophageal reflux disease)     Hyperlipidemia LDL goal <100     Advance Care Planning     Neuropathy     SHERRY (obstructive sleep apnea)-severe (AHI 35)     zEncounter for counseling     Anemia of chronic disease     Migraine     Osteoporosis     Cornea guttata, ou     Conjunctival concretions     Stenosis, spinal, lumbar     Other chronic pain     Obesity     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     MGD (meibomian gland dysfunction)     Blepharitis of both eyes     Personal history of healed osteoporosis  fracture     Iron malabsorption     Hyperglycemia     Chronic bilateral low back pain without sciatica     Allergic state, subsequent encounter     BMI 32.0-32.9,adult     Spinal stenosis of lumbar region without neurogenic claudication     Herniated nucleus pulposus, L3-4     S/P lumbar fusion     Anxiety     Low iron     BMI 28.0-28.9,adult     History of depression     Past Surgical History:   Procedure Laterality Date     ABDOMEN SURGERY      c-sections     ARTHROSCOPY KNEE RT/LT  01/06    left     BACK SURGERY  2013    disc     BREAST BIOPSY, RT/LT      left benign     BREAST SURGERY  90's    lumpectomy     C ANESTH,TOTAL HIP ARTHROPLASTY  2010    Rt hip     C HAND/FINGER SURGERY UNLISTED  11/05    right hand     C NONSPECIFIC PROCEDURE  91    left foot surgery     C NONSPECIFIC PROCEDURE  95    R MCP surgery     C TOTAL KNEE ARTHROPLASTY  2006    left     C/SECTION, LOW TRANSVERSE  66,72    x 2     CATARACT IOL, RT/LT       COLONOSCOPY  2006,2009     EYE SURGERY      cataracs     HC ESOPH/GAS REFLUX TEST W NASAL IMPED >1 HR  2/1/2012    Procedure:ESOPHAGEAL IMPEDENCE FUNCTION TEST WITH 24 HOUR PH GREATER THAN 1 HOUR; Surgeon:KAYKAY JULIO; Location:[facility]     IR CAUDAL EPIDURAL INJECTION SINGLE  5/2/2012    LESI L5-S1 at [facility]     OPTICAL TRACKING SYSTEM FUSION POSTERIOR SPINE LUMBAR N/A 4/3/2017    Procedure: OPTICAL TRACKING SYSTEM FUSION SPINE POSTERIOR LUMBAR ONE LEVEL;  Surgeon: Anthony Michele MD;  Location: [facility] OR     SURGICAL HISTORY OF -   2007    right knee total replacement       Social History   Substance Use Topics     Smoking status: Former Smoker     Packs/day: 1.00     Years: 41.00     Types: Cigarettes     Quit date: 1/1/1999     Smokeless tobacco: Never Used      Comment: former smoker     Alcohol use 0.0 oz/week      Comment: Periodically.     Family History   Problem Relation Age of Onset     Arthritis Mother      Alzheimer Disease Mother      Hyperlipidemia Mother       Osteoporosis Mother      HEART DISEASE Father      MI ( from this)     Alcohol/Drug Father      Arthritis Sister      Hypertension Sister      Cancer Son      Diabetes Son      Neurologic Disorder Sister      Schizophrenic     Hypertension Sister      Hyperlipidemia Sister      Mental Illness Sister      Diabetes Son      Other Cancer Son          Current Outpatient Prescriptions   Medication Sig Dispense Refill     acetaminophen (TYLENOL) 500 MG tablet Take 2 tablets (1,000 mg) by mouth every 8 hours as needed for pain 100 tablet 0     Ascorbic Acid 1000 MG TABS Take 1 tablet by mouth 3 times daily       aspirin 81 MG tablet Take 1 tablet by mouth daily.       atorvastatin (LIPITOR) 40 MG tablet Take 1 tablet (40 mg) by mouth daily 90 tablet 2     Calcium Citrate-Vitamin D (CALCIUM CITRATE + PO) Take 2 tablets by mouth daily       Cholecalciferol (VITAMIN D-3 PO) Take 1,000 Units by mouth 2 times daily       cyanocobalamin 500 MCG TABS Take 1 tablet by mouth 2 times daily       ferrous gluconate (FERGON) 324 (38 Fe) MG tablet Take 1 tablet (324 mg) by mouth daily (with breakfast) 270 tablet 1     FOLIC ACID PO Take 800 mcg by mouth daily       hydroxychloroquine (PLAQUENIL) 200 MG tablet Take 1 tablet (200 mg) by mouth 2 times daily 60 tablet 4     ibuprofen (ADVIL) 200 MG tablet Take 200 mg by mouth every 4 hours as needed for mild pain       magnesium oxide (MAG-OX) 400 (241.3 MG) MG tablet Take 1 tablet (400 mg) by mouth daily       methotrexate 2.5 MG tablet Take 10 tablets (25 mg) by mouth once a week 120 tablet 1     multivitamin, therapeutic with minerals (MULTI-VITAMIN) TABS Take 1 tablet by mouth daily       Omega-3 Fatty Acids (OMEGA-3 FISH OIL PO) Take 2,000 mg by mouth daily        order for [facility] Respironics REMSTAR 60 Series Auto CPAP 12-15 cm H2O, Wisp nasal mask w/a s/m cushion       pantoprazole (PROTONIX) 40 MG EC tablet TAKE ONE TABLET BY MOUTH EVERY DAY 30-60 MINUTES BEFORE A MEAL 90 tablet 2  "    tofacitinib (XELJANZ XR) 11 MG 24 hr tablet Take 1 tablet (11 mg) by mouth daily 90 tablet 3     vitamin  B complex with vitamin C (VITAMIN  B COMPLEX) TABS Take 1 tablet by mouth daily.  0     VITAMIN E PO Take 2,000 Units by mouth daily        cyclobenzaprine (FLEXERIL) 10 MG tablet Take 1 tablet (10 mg) by mouth 2 times daily as needed for muscle spasms (Patient not taking: Reported on 8/8/2018) 60 tablet 2     ipratropium (ATROVENT) 0.06 % spray Spray 2 sprays into both nostrils 4 times daily as needed (Patient not taking: Reported on 8/8/2018) 1 Box 3     predniSONE (DELTASONE) 1 MG tablet Take 20 tablets (20 mg) by mouth as needed (Patient not taking: Reported on 8/8/2018) 5 tablet 0     [DISCONTINUED] methotrexate 2.5 MG tablet Take 10 tablets (25 mg) by mouth once a week .  Split dose between AM and PM (5 tabs in the morning, and 5 tabs in the evening; all taken within the same day each week) 120 tablet 1     Allergies   Allergen Reactions     Abatacept      Severe headaches     Adhesive Tape      Plastic tape     Celebrex [Celecoxib]      Ineffective     Ethanol      Antihistamines     Orencia [Abatacept]      Headache     Septra [Bactrim]      Sulfa Drugs      \"deathly ill\"     Tramadol      Headache     BP Readings from Last 3 Encounters:   11/23/18 117/74   11/15/18 (!) 147/93   08/08/18 123/79    Wt Readings from Last 3 Encounters:   11/23/18 198 lb (89.8 kg)   11/15/18 192 lb (87.1 kg)   08/08/18 192 lb (87.1 kg)                  Labs reviewed in EPIC    Reviewed and updated as needed this visit by clinical staff       Reviewed and updated as needed this visit by Provider         ROS:  Constitutional, HEENT, cardiovascular, pulmonary, gi and gu systems are negative, except as otherwise noted.    OBJECTIVE:     /74 (BP Location: Left arm, Patient Position: Standing, Cuff Size: Adult Large)  Pulse 98  Temp 97  F (36.1  C) (Oral)  Resp 20  Ht 5' 7.5\" (1.715 m)  Wt 198 lb (89.8 kg)  SpO2 " 95%  BMI 30.55 kg/m2  Body mass index is 30.55 kg/(m^2).  GENERAL: healthy, alert and no distress  EYES: Eyes grossly normal to inspection, PERRL and conjunctivae and sclerae normal  HENT: ear canals and TM's normal, nose and mouth without ulcers or lesions  NECK: no adenopathy, no asymmetry, masses, or scars and thyroid normal to palpation  RESP: lungs clear to auscultation - no rales, rhonchi or wheezes  CV: regular rate and rhythm, normal S1 S2, no S3 or S4, no murmur, click or rub, no peripheral edema and peripheral pulses strong  ABDOMEN: soft, nontender, no hepatosplenomegaly, no masses and bowel sounds normal  MS: no gross musculoskeletal defects noted, no edema  NEURO: Normal strength and tone, sensory exam grossly normal, proprioception normal, mentation intact, cranial nerves 2-12 intact, gait normal including heel/toe/tandem walking, Romberg normal and rapid alternating movements normal    Diagnostic Test Results:  pending    ASSESSMENT/PLAN:     1. Dizziness  Normal neuro exam today.  Will obtain MRI due to dizziness, new headache.  - MR Brain w/o Contrast; Future  - CBC with platelets differential  - Comprehensive metabolic panel  - TSH with free T4 reflex    2. Elevated BP without diagnosis of hypertension  Blood pressure normal today.    3. Acute intractable headache, unspecified headache type  As above.   - MR Brain w/o Contrast; Future    4. Hyperlipidemia LDL goal <100    - LDL cholesterol direct    5. 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    - CBC with platelets differential  - Ferritin  - Iron and iron binding capacity    FUTURE APPOINTMENTS:       - Follow-up visit in 2 weeks if not improving.    AKIRA Jones [facility]

## 2018-11-23 NOTE — Clinical Note
Please fax over MRI request to [facility] in [place] for open MRI.  Thanks, Dahiana Flores, CNP

## 2018-11-23 NOTE — PROGRESS NOTES
Dear Ginger,    Your recent test results are attached.      No anemia.    If you have any questions please feel free to contact [phone] or myself via [email or web address].    Sincerely,  Dahiana Flores, CNP

## 2018-11-23 NOTE — PATIENT INSTRUCTIONS
Please schedule open MRI at [facility] [address] [place] [address]   [phone]  [facility]    If you have any questions regarding to your visit please contact your care team:     Team Pink:   Clinic Hours Telephone Number   Internal Medicine:  Dr. Georgia Flores, NP 7am-7pm  Monday - Thursday   7am-5pm  Fridays  [phone]  (Appointment scheduling available 24/7)   Urgent Care - [place] [facility] - 11am-9pm Monday-Friday Saturday-Sunday- 9am-5pm   [place] - 5pm-9pm Monday-Friday Saturday-Sunday- 9am-5pm  [phone] - [place]  [phone] - [place]       What options do I have for a visit other than an office visit? We offer electronic visits (e-visits) and telephone visits, when medically appropriate.  Please check with your medical insurance to see if these types of visits are covered, as you will be responsible for any charges that are not paid by your insurance.      You can use Opez (secure electronic communication) to access to your chart, send your primary care provider a message, or make an appointment. Ask a team member how to get started.     For a price quote for your services, please call our Consumer Price Line at [phone] or our Imaging Cost estimation line at [phone] (for imaging tests).  Charline Alves MA

## 2018-11-26 NOTE — PROGRESS NOTES
Dear Ginger,    Your recent test results are attached.      Normal kidney function and electrolytes.  Good iron stores.  Normal thyroid.  Good cholesterol.      If you have any questions please feel free to contact [phone] or myself via Jiankongbaot.    Sincerely,  Dahiana Flores, CNP

## 2018-11-29 ENCOUNTER — TELEPHONE (OUTPATIENT)
Dept: NEUROSURGERY | Facility: CLINIC | Age: 78
End: 2018-11-29

## 2018-11-29 ENCOUNTER — TELEPHONE (OUTPATIENT)
Dept: INTERNAL MEDICINE | Facility: CLINIC | Age: 78
End: 2018-11-29

## 2018-11-29 DIAGNOSIS — M81.0 OSTEOPOROSIS, UNSPECIFIED OSTEOPOROSIS TYPE, UNSPECIFIED PATHOLOGICAL FRACTURE PRESENCE: Primary | ICD-10-CM

## 2018-11-29 NOTE — TELEPHONE ENCOUNTER
Was previously ordered and managed by endo.  Is overdue for a f/u with endo  Please advise if Dr. Vázquez or seda should order this    Order pended    Zackary Pelaez RN

## 2018-11-29 NOTE — TELEPHONE ENCOUNTER
----- Message from Carmen Fierro NP sent at 11/29/2018  2:52 PM CST -----  Can you please call pt and let her know she has disc protrusion at T6-7 and extrusion at T8-9 and we could order TESI to see if that gives her some relief.    Thanks,  Carmen

## 2018-11-29 NOTE — TELEPHONE ENCOUNTER
Informed patient of below message per SUE Morales. Patient would like to think about the injection, she will return a call to the clinic if she wishes to proceed with a TESI as recommended.

## 2018-11-29 NOTE — TELEPHONE ENCOUNTER
Reason for Call: Request for an order or referral:    Order or referral being requested: Need order for dexa scan. Please call patient when order is in so we can get her scheduled. Thank you    Date needed: as soon as possible    Has the patient been seen by the PCP for this problem? YES    Additional comments: Please call patient when done    Phone number Patient can be reached at:  Home number on file [phone] (home)    Best Time:  Any    Can we leave a detailed message on this number?  YES    Call taken on 11/29/2018 at 10:27 AM by Chanel Harrington

## 2018-12-04 ENCOUNTER — TRANSFERRED RECORDS (OUTPATIENT)
Dept: HEALTH INFORMATION MANAGEMENT | Facility: CLINIC | Age: 78
End: 2018-12-04

## 2018-12-06 ENCOUNTER — TELEPHONE (OUTPATIENT)
Dept: INTERNAL MEDICINE | Facility: CLINIC | Age: 78
End: 2018-12-06

## 2018-12-06 DIAGNOSIS — J32.9 SINUS INFECTION: Primary | ICD-10-CM

## 2018-12-06 NOTE — TELEPHONE ENCOUNTER
Please call patient-    Her MRI did not show any acute findings.  It did show some mild mucosal thickening to her sinuses.  Is she still having pain?    Thanks,  Dahiana Flores, CNP

## 2018-12-06 NOTE — TELEPHONE ENCOUNTER
Patient notified of Provider's message as written.  Patient verbalized understanding.  Dizziness has improved but the HA is still the same without changes     Zackary Pelaez RN

## 2018-12-07 RX ORDER — DOXYCYCLINE 100 MG/1
100 CAPSULE ORAL 2 TIMES DAILY
Qty: 14 CAPSULE | Refills: 0 | Status: SHIPPED | OUTPATIENT
Start: 2018-12-07 | End: 2019-06-05

## 2018-12-07 NOTE — TELEPHONE ENCOUNTER
Left detailed message for patient with information below per [place] in demographics section.   Advised to call RN hotline [phone] if any questions and for an update in 7 days.  Rx sent to [facility] pharmacy.    Kelli Cage RN

## 2018-12-07 NOTE — TELEPHONE ENCOUNTER
Let's see if we try treating her sinus infection if headache improves.  Please have her start doxycycline 100 mg po BID x 7 days.  Please have her update after she finishes the course.    Thanks,  Dahiana Flores, CNP

## 2018-12-10 DIAGNOSIS — M05.79 RHEUMATOID ARTHRITIS INVOLVING MULTIPLE SITES WITH POSITIVE RHEUMATOID FACTOR (H): ICD-10-CM

## 2018-12-10 DIAGNOSIS — E78.5 HYPERLIPIDEMIA LDL GOAL <100: Chronic | ICD-10-CM

## 2018-12-10 LAB
ALBUMIN SERPL-MCNC: 3.7 G/DL (ref 3.4–5)
ALP SERPL-CCNC: 40 U/L (ref 40–150)
ALT SERPL W P-5'-P-CCNC: 40 U/L (ref 0–50)
AST SERPL W P-5'-P-CCNC: 33 U/L (ref 0–45)
BASOPHILS # BLD AUTO: 0 10E9/L (ref 0–0.2)
BASOPHILS NFR BLD AUTO: 0.2 %
BILIRUB DIRECT SERPL-MCNC: 0.1 MG/DL (ref 0–0.2)
BILIRUB SERPL-MCNC: 0.3 MG/DL (ref 0.2–1.3)
CREAT SERPL-MCNC: 0.64 MG/DL (ref 0.52–1.04)
CRP SERPL-MCNC: <2.9 MG/L (ref 0–8)
DIFFERENTIAL METHOD BLD: ABNORMAL
EOSINOPHIL # BLD AUTO: 0.3 10E9/L (ref 0–0.7)
EOSINOPHIL NFR BLD AUTO: 5.9 %
ERYTHROCYTE [DISTWIDTH] IN BLOOD BY AUTOMATED COUNT: 13.9 % (ref 10–15)
ERYTHROCYTE [SEDIMENTATION RATE] IN BLOOD BY WESTERGREN METHOD: 17 MM/H (ref 0–30)
GFR SERPL CREATININE-BSD FRML MDRD: >90 ML/MIN/1.7M2
HCT VFR BLD AUTO: 35.8 % (ref 35–47)
HGB BLD-MCNC: 11.5 G/DL (ref 11.7–15.7)
LYMPHOCYTES # BLD AUTO: 1.4 10E9/L (ref 0.8–5.3)
LYMPHOCYTES NFR BLD AUTO: 29.7 %
MCH RBC QN AUTO: 31.7 PG (ref 26.5–33)
MCHC RBC AUTO-ENTMCNC: 32.1 G/DL (ref 31.5–36.5)
MCV RBC AUTO: 99 FL (ref 78–100)
MONOCYTES # BLD AUTO: 0.7 10E9/L (ref 0–1.3)
MONOCYTES NFR BLD AUTO: 14.5 %
NEUTROPHILS # BLD AUTO: 2.3 10E9/L (ref 1.6–8.3)
NEUTROPHILS NFR BLD AUTO: 49.7 %
PLATELET # BLD AUTO: 335 10E9/L (ref 150–450)
PROT SERPL-MCNC: 7.2 G/DL (ref 6.8–8.8)
RBC # BLD AUTO: 3.63 10E12/L (ref 3.8–5.2)
WBC # BLD AUTO: 4.5 10E9/L (ref 4–11)

## 2018-12-10 PROCEDURE — 82565 ASSAY OF CREATININE: CPT | Performed by: INTERNAL MEDICINE

## 2018-12-10 PROCEDURE — 80076 HEPATIC FUNCTION PANEL: CPT | Performed by: INTERNAL MEDICINE

## 2018-12-10 PROCEDURE — 85652 RBC SED RATE AUTOMATED: CPT | Performed by: INTERNAL MEDICINE

## 2018-12-10 PROCEDURE — 36415 COLL VENOUS BLD VENIPUNCTURE: CPT | Performed by: INTERNAL MEDICINE

## 2018-12-10 PROCEDURE — 86140 C-REACTIVE PROTEIN: CPT | Performed by: INTERNAL MEDICINE

## 2018-12-10 PROCEDURE — 85025 COMPLETE CBC W/AUTO DIFF WBC: CPT | Performed by: INTERNAL MEDICINE

## 2018-12-12 ENCOUNTER — TELEPHONE (OUTPATIENT)
Dept: RHEUMATOLOGY | Facility: CLINIC | Age: 78
End: 2018-12-12

## 2018-12-12 ENCOUNTER — ANCILLARY PROCEDURE (OUTPATIENT)
Dept: BONE DENSITY | Facility: CLINIC | Age: 78
End: 2018-12-12
Attending: INTERNAL MEDICINE
Payer: COMMERCIAL

## 2018-12-12 ENCOUNTER — TELEPHONE (OUTPATIENT)
Dept: FAMILY MEDICINE | Facility: CLINIC | Age: 78
End: 2018-12-12

## 2018-12-12 ENCOUNTER — OFFICE VISIT (OUTPATIENT)
Dept: RHEUMATOLOGY | Facility: CLINIC | Age: 78
End: 2018-12-12
Payer: COMMERCIAL

## 2018-12-12 VITALS
SYSTOLIC BLOOD PRESSURE: 131 MMHG | DIASTOLIC BLOOD PRESSURE: 78 MMHG | TEMPERATURE: 97.9 F | BODY MASS INDEX: 30.86 KG/M2 | HEART RATE: 78 BPM | WEIGHT: 200 LBS | OXYGEN SATURATION: 95 %

## 2018-12-12 DIAGNOSIS — M05.79 RHEUMATOID ARTHRITIS INVOLVING MULTIPLE SITES WITH POSITIVE RHEUMATOID FACTOR (H): Primary | ICD-10-CM

## 2018-12-12 DIAGNOSIS — M81.0 OSTEOPOROSIS, UNSPECIFIED OSTEOPOROSIS TYPE, UNSPECIFIED PATHOLOGICAL FRACTURE PRESENCE: ICD-10-CM

## 2018-12-12 DIAGNOSIS — Z78.0 ASYMPTOMATIC POSTMENOPAUSAL STATUS: ICD-10-CM

## 2018-12-12 DIAGNOSIS — Z79.899 HIGH RISK MEDICATIONS (NOT ANTICOAGULANTS) LONG-TERM USE: ICD-10-CM

## 2018-12-12 DIAGNOSIS — M79.675 TOE PAIN, LEFT: ICD-10-CM

## 2018-12-12 LAB — URATE SERPL-MCNC: 5.4 MG/DL (ref 2.6–6)

## 2018-12-12 PROCEDURE — 77080 DXA BONE DENSITY AXIAL: CPT | Mod: 59 | Performed by: INTERNAL MEDICINE

## 2018-12-12 PROCEDURE — 36415 COLL VENOUS BLD VENIPUNCTURE: CPT | Performed by: INTERNAL MEDICINE

## 2018-12-12 PROCEDURE — 77081 DXA BONE DENSITY APPENDICULR: CPT | Performed by: INTERNAL MEDICINE

## 2018-12-12 PROCEDURE — 84550 ASSAY OF BLOOD/URIC ACID: CPT | Performed by: INTERNAL MEDICINE

## 2018-12-12 PROCEDURE — 99214 OFFICE O/P EST MOD 30 MIN: CPT | Performed by: INTERNAL MEDICINE

## 2018-12-12 RX ORDER — HYDROXYCHLOROQUINE SULFATE 200 MG/1
200 TABLET, FILM COATED ORAL 2 TIMES DAILY
Qty: 180 TABLET | Refills: 2 | Status: SHIPPED | OUTPATIENT
Start: 2018-12-12 | End: 2019-07-11

## 2018-12-12 RX ORDER — COLCHICINE 0.6 MG/1
1.2 CAPSULE ORAL ONCE
Qty: 3 CAPSULE | Refills: 0 | Status: SHIPPED | OUTPATIENT
Start: 2018-12-12 | End: 2018-12-12

## 2018-12-12 RX ORDER — ATORVASTATIN CALCIUM 40 MG/1
TABLET, FILM COATED ORAL
Qty: 90 TABLET | Refills: 2 | Status: SHIPPED | OUTPATIENT
Start: 2018-12-12 | End: 2019-06-05

## 2018-12-12 RX ORDER — NAPROXEN SODIUM 220 MG
220 TABLET ORAL 2 TIMES DAILY WITH MEALS
COMMUNITY
End: 2019-11-25

## 2018-12-12 RX ORDER — PREDNISONE 20 MG/1
TABLET ORAL
Qty: 5 TABLET | Refills: 1 | Status: SHIPPED | OUTPATIENT
Start: 2018-12-12 | End: 2019-03-06

## 2018-12-12 RX ORDER — METHOTREXATE SODIUM 2.5 MG/1
25 TABLET ORAL WEEKLY
Qty: 120 TABLET | Refills: 1 | Status: SHIPPED | OUTPATIENT
Start: 2018-12-12 | End: 2019-03-06

## 2018-12-12 ASSESSMENT — PAIN SCALES - GENERAL: PAINLEVEL: NO PAIN (0)

## 2018-12-12 NOTE — TELEPHONE ENCOUNTER
Prescription approved per [facility].  Phyllis Burris, RN - BC

## 2018-12-12 NOTE — PROGRESS NOTES
Rheumatology Clinic Visit      Ginger Marshall MRN# [id number]   YOB: 1940 Age: 78 year old      Date of visit: 12/12/18   PCP: Georgia Vázquez MD     Chief Complaint   Patient presents with:  RECHECK: RA-left great toe pain.    Assessment and Plan   1. Seropositive ( [2009], CCP >100 [2009]; hx of rheumatoid nodule by 6/14/2011 pathology) Erosive Rheumatoid Arthritis: Previously failed remicade (staph infection during therapy, but was immediately after a joint injection) and orencia (migraines).  Sulfa allergy.  Currently on methotrexate 25 mg once weekly, folic acid 800 mcg daily, Xeljanz XR 11mg daily, HCQ 200mg BID. She also has prednisone 20 mg daily ×5 days to use as needed for flare. Methotrexate was reduced in the past with worsening symptoms.   Note that she had a history of oral sore that resolved with folic acid 2mg daily; she ran out of Rx'd folic acid and did not want to restart it yet; she takes other supplements that have a total of 800mcg of folic acid; if needed in the future may restart additional folic acid.  Doing better since adding hydroxychloroquine and increasing methotrexate.  - Continue methotrexate 25 mg once weekly  - Continue folic acid 800mcg daily as noted above (from over-the-counter supplements)  - Continue hydroxychloroquine 200mg BID (eye exam pending; ophthalmology referral previously given)  - Continue Xeljanz XR 11mg daily  - PRN RA flare: prednisone 20mg daily x5days  - Labs every 3 months: CBC, Creatinine, Hepatic Panel              Rapid 3, cumulative scores                       12/12/2018: 7.5 (MTX 25mg wkly, HCQ 400mg daily, Xeljanz XR 11mg daily)                       08/08/2018: 7    (MTX 22.5mg wkly, Xeljanz XR 11mg daily)                       02/07/2018: 4    (MTX 22.5mg wkly, Xeljanz XR 11mg daily)                       11/08/2017: 5    (MTX 22.5mg wkly, Xeljanz XR 11mg daily)                       08/09/2017: 0    (MTX 22.5mg wkly, Xeljanz XR 11mg  daily)                      05/10/2017: 5    (MTX 25mg wkly, Xeljanz XR 11mg daily) RA doing well    2. Right hip pain: Status post WALTER twice in the past. Followed by [facility] orth[facility]ics.     3. Bone Health: Managed by Endocrinology.     4. Iron Deficiency Anemia: Managed by PCP.     5. Left 1st toe pain: 2 episodes of sudden onset left toe pain followed by diffuse left foot pain that made ambulation difficult.  Also with nodules over both Achilles tendons and the right elbow that are either rheumatoid nodules or tophi.  She reports having history of gout decades ago.  Denies ever being on colchicine or allopurinol.  Discussed colchicine.  Check uric acid today.  - Colchicine: (MITIGARE) 0.6 MG capsule; Take 2 capsules (1.2 mg) by mouth once for 1 dose at the onset of a gout flare, followed by 1 capsule (0.6mg) 1 hour later.  Dispense: 3 capsule; Refill: 0    # Colchicine Risks and Benefits.  The risks and benefits of colchicine were discussed in detail and the patient verbalized understanding.  The risks discussed include, but are not limited to, the risk for hypersensitivity, anaphylaxis, anaphylactoid reactions, diarrhea, vomiting, nausea, fatigue, headache, myelosuppression (suppressing the bone marrow), and neuromuscular toxicity.  It was explained that if nausea, vomiting, or diarrhea occur that the patient is to stop taking the medication immediately and notifying the rheumatology clinic.     6.  Metatarsalgia: Worse on the left.  Advised orthotics     7.  Vaccinations: Vaccinations reviewed with Ms. Marshall.  Risks and benefits of vaccinations were discussed. Data and lack of data for shingrix reviewed.  CDC stance on shingrix when on moderate to high immunosuppression reviewed.   - Influenza: up to date per patient  - [id number]: up to date  - [id number]: up to date  - Shingrix: advised getting at the pharmacy     Ms. Marshall verbalized agreement with and understanding of the rational for the diagnosis  and treatment plan.  All questions were answered to best of my ability and the patient's satisfaction. Ms. Marshall was advised to contact the clinic with any questions that may arise after the clinic visit.      Thank you for involving me in the care of the patient    Return to clinic: 3-4 months    HPI   Ginger Marshall is a 78 year old female with a history of multiple foot surgeries, hand tendon transfers, MCP replacements (most recent being in August 2015), bilateral TKA, right WALTER, left distal radius fracture history, s/p lumbar fusion, osteoporosis and seropositive (RF+, CCP+) erosive rheumatoid arthritis who presents for follow-up of rheumatoid arthritis.      Today, Ms. Marshall reports that she is doing better with the addition of hydroxychloroquine and the higher dose of methotrexate.  Morning stiffness for no more than 5 minutes.  Generally without joint swelling or pain, but does have pain across her left third-fifth MTPs on the plantar aspect that is worse with ambulation; she says that she can feel the bone there.  No breakdown of the skin under her left foot.  She also reports having acute onset left first MTP pain without swelling that quickly progressed to whole left foot pain that resolved with time; this made ambulation difficult when she was symptomatic.      She reports hx of gout decades ago.    Denies fevers, chills, nausea, vomiting, constipation, diarrhea. No abdominal pain. No chest pain/pressure, palpitations, or shortness of breath. No nasal or oral sores.   No neck pain. No rash. No LE swelling.     Tobacco: quit in 1999  EtOH: 1 glass of wine every month at most  Drugs: None  Occupation: , retired     ROS   GEN: No fevers, chills  SKIN: No itching, rashes, sores  HEENT:  No oral or nasal ulcers.  CV: No chest pain, pressure, palpitations, or dyspnea on exertion.  PULM: No SOB, wheeze, cough.  GI:  No nausea, vomiting, constipation, diarrhea. No blood in stool. No  abdominal pain.  : No blood in urine.  MSK: See HPI.  NEURO: No numbness or tingling  EXT: No LE swelling    Active Problem List     Patient Active Problem List   Diagnosis     RA (Rheumatoid Arthritis) off Plaquenil     Menopause     History of colonic polyps     Mitral Regurgitation     Multinodular goiter     CARDIOVASCULAR SCREENING; LDL GOAL LESS THAN 130     Psychophysiologic insomnia     Pulmonary nodule     PSEUDOPHAKIA OU     PVD (POSTERIOR VITREOUS DETACHMENT) OU     PXF (PSEUDOEXFOLIATION OF LENS CAPSULE) OD     GERD (gastroesophageal reflux disease)     Hyperlipidemia LDL goal <100     Advance Care Planning     Neuropathy     SHERRY (obstructive sleep apnea)-severe (AHI 35)     zEncounter for counseling     Anemia of chronic disease     Migraine     Osteoporosis     Cornea guttata, ou     Conjunctival concretions     Stenosis, spinal, lumbar     Other chronic pain     Obesity     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     MGD (meibomian gland dysfunction)     Blepharitis of both eyes     Personal history of healed osteoporosis fracture     Iron malabsorption     Hyperglycemia     Chronic bilateral low back pain without sciatica     Allergic state, subsequent encounter     BMI 32.0-32.9,adult     Spinal stenosis of lumbar region without neurogenic claudication     Herniated nucleus pulposus, L3-4     S/P lumbar fusion     Anxiety     Low iron     BMI 28.0-28.9,adult     History of depression     Past Medical History     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     History of total knee replacement 7/23/2009     Menopause late 40's     Other chronic pain     joints     Pelvic fracture (H) 5/13/2014     PUD (peptic ulcer disease)      Rheumatoid arteritis      Sleep apnea     Uses a CPAP     Vitamin B12 deficiency      Past Surgical History     Past  "Surgical History:   Procedure Laterality Date     ABDOMEN SURGERY      c-sections     ARTHROSCOPY KNEE RT/LT  01/06    left     BACK SURGERY  2013    disc     BREAST BIOPSY, RT/LT      left benign     BREAST SURGERY  90's    lumpectomy     C ANESTH,TOTAL HIP ARTHROPLASTY  2010    Rt hip     C HAND/FINGER SURGERY UNLISTED  11/05    right hand     C NONSPECIFIC PROCEDURE  91    left foot surgery     C NONSPECIFIC PROCEDURE  95    R MCP surgery     C TOTAL KNEE ARTHROPLASTY  2006    left     C/SECTION, LOW TRANSVERSE  66,72    x 2     CATARACT IOL, RT/LT       COLONOSCOPY  2006,2009     EYE SURGERY      cataracs     HC ESOPH/GAS REFLUX TEST W NASAL IMPED >1 HR  2/1/2012    Procedure:ESOPHAGEAL IMPEDENCE FUNCTION TEST WITH 24 HOUR PH GREATER THAN 1 HOUR; Surgeon:KAYKAY JULIO; Location:[facility]     IR CAUDAL EPIDURAL INJECTION SINGLE  5/2/2012    LESI L5-S1 at [facility]     OPTICAL TRACKING SYSTEM FUSION POSTERIOR SPINE LUMBAR N/A 4/3/2017    Procedure: OPTICAL TRACKING SYSTEM FUSION SPINE POSTERIOR LUMBAR ONE LEVEL;  Surgeon: Anthony Michele MD;  Location: [facility]     SURGICAL HISTORY OF -   2007    right knee total replacement     Allergy     Allergies   Allergen Reactions     Abatacept      Severe headaches     Adhesive Tape      Plastic tape     Celebrex [Celecoxib]      Ineffective     Ethanol      Antihistamines     Orencia [Abatacept]      Headache     Septra [Bactrim]      Sulfa Drugs      \"deathly ill\"     Tramadol      Headache     Current Medication List     Current Outpatient Medications   Medication Sig     acetaminophen (TYLENOL) 500 MG tablet Take 2 tablets (1,000 mg) by mouth every 8 hours as needed for pain     Ascorbic Acid 1000 MG TABS Take 1 tablet by mouth 3 times daily     aspirin 81 MG tablet Take 1 tablet by mouth daily.     atorvastatin (LIPITOR) 40 MG tablet Take 1 tablet (40 mg) by mouth daily     Calcium Citrate-Vitamin D (CALCIUM CITRATE + PO) Take 2 tablets by mouth daily     " Cholecalciferol (VITAMIN D-3 PO) Take 1,000 Units by mouth 2 times daily     cyanocobalamin 500 MCG TABS Take 1 tablet by mouth 2 times daily     cyclobenzaprine (FLEXERIL) 10 MG tablet Take 1 tablet (10 mg) by mouth 2 times daily as needed for muscle spasms (Patient not taking: Reported on 8/8/2018)     doxycycline hyclate (VIBRAMYCIN) 100 MG capsule Take 1 capsule (100 mg) by mouth 2 times daily     ferrous gluconate (FERGON) 324 (38 Fe) MG tablet Take 1 tablet (324 mg) by mouth daily (with breakfast)     FOLIC ACID PO Take 800 mcg by mouth daily     hydroxychloroquine (PLAQUENIL) 200 MG tablet Take 1 tablet (200 mg) by mouth 2 times daily     ibuprofen (ADVIL) 200 MG tablet Take 200 mg by mouth every 4 hours as needed for mild pain     ipratropium (ATROVENT) 0.06 % spray Spray 2 sprays into both nostrils 4 times daily as needed (Patient not taking: Reported on 8/8/2018)     magnesium oxide (MAG-OX) 400 (241.3 MG) MG tablet Take 1 tablet (400 mg) by mouth daily     methotrexate 2.5 MG tablet Take 10 tablets (25 mg) by mouth once a week     multivitamin, therapeutic with minerals (MULTI-VITAMIN) TABS Take 1 tablet by mouth daily     Omega-3 Fatty Acids (OMEGA-3 FISH OIL PO) Take 2,000 mg by mouth daily      order for [facility] Respironics REMSTAR 60 Series Auto CPAP 12-15 cm H2O, Wisp nasal mask w/a s/m cushion     pantoprazole (PROTONIX) 40 MG EC tablet TAKE ONE TABLET BY MOUTH EVERY DAY 30-60 MINUTES BEFORE A MEAL     predniSONE (DELTASONE) 1 MG tablet Take 20 tablets (20 mg) by mouth as needed (Patient not taking: Reported on 8/8/2018)     tofacitinib (XELJANZ XR) 11 MG 24 hr tablet Take 1 tablet (11 mg) by mouth daily     vitamin  B complex with vitamin C (VITAMIN  B COMPLEX) TABS Take 1 tablet by mouth daily.     VITAMIN E PO Take 2,000 Units by mouth daily      No current facility-administered medications for this visit.      Social History   See HPI    Family History     Family History   Problem Relation Age of  "Onset     Arthritis Mother      Alzheimer Disease Mother      Hyperlipidemia Mother      Osteoporosis Mother      Heart Disease Father         MI ( from this)     Alcohol/Drug Father      Arthritis Sister      Hypertension Sister      Cancer Son      Diabetes Son      Neurologic Disorder Sister         Schizophrenic     Hypertension Sister      Hyperlipidemia Sister      Mental Illness Sister      Diabetes Son      Other Cancer Son      No change in family history since the previous clinic visit.    Physical Exam     Temp Readings from Last 3 Encounters:   18 97  F (36.1  C) (Oral)   11/15/18 98.4  F (36.9  C) (Oral)   18 96  F (35.6  C)     BP Readings from Last 5 Encounters:   18 117/74   11/15/18 (!) 147/93   18 123/79   18 127/76   18 120/76     Pulse Readings from Last 1 Encounters:   18 98     Resp Readings from Last 1 Encounters:   18 20     Estimated body mass index is 30.55 kg/m  as calculated from the following:    Height as of 18: 1.715 m (5' 7.5\").    Weight as of 18: 89.8 kg (198 lb).    GEN: NAD, overweight  HEENT: MMM. No oral lesions. Anicteric, noninjected sclera  CV: S1, S2. RRR. No m/r/g.  PULM: CTA bilaterally. No w/c.  MSK: Synovial swelling and tenderness palpation the bilateral second and fourth MCPs.   Subluxation of the bilateral MCPs. Right hand with old surgical scars over the MCPs. Bilateral second PIPs tender to palpation with mild synovial swelling; right third PIP with subtle synovial swelling and tenderness to palpation.  Wrists, elbows, shoulders, knees, ankles, and MTPs without swelling or tenderness palpation.     SKIN: No rash  EXT: No LE edema  PSYCH: Alert. Appropriate.     Labs   RF/CCP  Recent Labs   Lab Test 11  0843   RHF 38*     CBC  Recent Labs   Lab Test 12/10/18  1051 18  1309 18  1055   WBC 4.5 5.8 6.7   RBC 3.63* 3.89 3.89   HGB 11.5* 12.3 12.4   HCT 35.8 38.4 37.8   MCV 99 99 97   RDW " 13.9 14.1 13.8    361 376   MCH 31.7 31.6 31.9   MCHC 32.1 32.0 32.8   NEUTROPHIL 49.7 54.8 63.5   LYMPH 29.7 25.6 22.1   MONOCYTE 14.5 13.4 10.4   EOSINOPHIL 5.9 6.0 3.9   BASOPHIL 0.2 0.2 0.1   ANEU 2.3 3.2 4.3   ALYM 1.4 1.5 1.5   MARYURI 0.7 0.8 0.7   AEOS 0.3 0.4 0.3   ABAS 0.0 0.0 0.0     CMP  Recent Labs   Lab Test 12/10/18  1051 11/23/18  1309 08/06/18  1055  03/27/17  1142  01/27/17  1511   NA  --  141  --   --  139  --  142   POTASSIUM  --  3.9  --   --  4.0  --  4.1   CHLORIDE  --  107  --   --  107  --  107   CO2  --  29  --   --  25  --  25   ANIONGAP  --  5  --   --  7  --  10   GLC  --  123*  --   --  96  --  98   BUN  --  14  --   --  11  --  10   CR 0.64 0.59 0.65   < > 0.62   < > 0.62   GFRESTIMATED >90 >90 88   < > >90  Non  GFR Calc     < > >90  Non  GFR Calc     GFRESTBLACK >90 >90 >90   < > >90  African American GFR Calc     < > >90   GFR Calc     BRANDEE  --  9.2  --   --  9.7  --  9.9   BILITOTAL 0.3 0.3 0.3   < >  --    < > 0.3   ALBUMIN 3.7 3.9 3.7   < >  --    < > 3.8   PROTTOTAL 7.2 7.6 7.9   < >  --    < > 7.6   ALKPHOS 40 55 50   < >  --    < > 45   AST 33 29 24   < >  --    < > 35   ALT 40 33 30   < >  --    < > 39    < > = values in this interval not displayed.     Calcium/VitaminD  Recent Labs   Lab Test 11/23/18  1309 03/27/17  1142 01/27/17  1511 10/28/16  1239  02/05/13  1050   BRANDEE 9.2 9.7 9.9 9.4   < > 9.3   VITDT  --   --   --  37  --  33    < > = values in this interval not displayed.     ESR/CRP  Recent Labs   Lab Test 12/10/18  1051 08/06/18  1055 02/05/18  1021   SED 17 49* 25   CRP <2.9 3.4 2.9     Lipid Panel  Recent Labs   Lab Test 11/23/18  1309 12/14/17  0957 10/28/16  1238 12/30/15  0842 01/13/14  1109 07/24/13  1024  04/13/12  0831   CHOL  --  164 176 182 135 177  --  167   TRIG  --  121 103 70 89 80  --  115   HDL  --  79 77 92 49* 89  --  59   LDL 72 61 78 76 68 72   < > 85   VLDL  --   --   --   --  18 16  --  23  "  CHOLHDLRATIO  --   --   --   --  2.8 2.0  --  2.8   NHDL  --  85 99 90  --   --   --   --     < > = values in this interval not displayed.     Hepatitis B  Recent Labs   Lab Test 09/15/15  1159 04/30/12  1005   AUSAB 0.11  --    HBCAB Nonreactive  --    HEPBANG Nonreactive Negative     Hepatitis C  Recent Labs   Lab Test 09/15/15  1159 04/30/12  1005   HCVAB Nonreactive   Assay performance characteristics have not been established for newborns,   infants, and children   Negative       Tuberculosis Screening  Recent Labs   Lab Test 05/07/18  1033 09/15/15  1200 04/30/12  1006   TBRSLT Negative Negative Negative   TBAGN 0.00 0.00 0.00     \"HAND BILATERAL THREE OR MORE VIEWS 9/15/2015 12:28 PM   HISTORY: Rheumatoid arthritis; establish baseline. Rheumatoid  arthritis(714.0)  COMPARISON: None  IMPRESSION  IMPRESSION: There is diffuse osteopenia. Postoperative changes of the  right second and third metacarpophalangeal joints. Moderate joint  space narrowing involving the left second and third  metacarpophalangeal joints. Mild joint space narrowing of the right  fourth and fifth metacarpophalangeal joints. There are also small  periarticular erosive changes of the metacarpophalangeal joints. There  is fusion of several of the right carpal bones. Marked joint space  loss in the left wrist. Findings are consistent with the clinical  history of rheumatoid arthritis. Chronic fracture deformities of the  right distal radius and ulna. No acute fracture is seen.  SPENCER MONSALVE MD\"    Immunization History     Immunization History   Administered Date(s) Administered     Influenza (High Dose) 3 valent vaccine 10/28/2013, 09/23/2014, 11/11/2015, 09/23/2016     Influenza (IIV3) PF 10/14/2007, 10/21/2009     Mantoux Tuberculin Skin Test 11/03/2006     Pneumo Conj 13-V (2010&after) 07/29/2015     Pneumococcal 23 valent 06/26/2000, 06/02/2010     TD (ADULT, 7+) 07/20/2009          Chart documentation done in part with Dragon Voice " recognition Software. Although reviewed after completion, some word and grammatical error may remain.    Nico Byers MD

## 2018-12-12 NOTE — TELEPHONE ENCOUNTER
Prior Authorization Retail Medication Request    Medication/Dose: mitigare 0.6 mg  ICD code (if different than what is on RX):  P19100  Previously Tried and Failed: N      Insurance Name:  [facility] part D  Insurance ID:  [id number]  Phone number: [phone]      Pharmacy Information (if different than what is on RX)  Name: [facility]  Phone:  [phone]    Thank you   Marge Oh. Pharmacy Technician   [facility] [place]

## 2018-12-12 NOTE — NURSING NOTE
"Chief Complaint   Patient presents with     RECHECK     RA-left great toe pain.       Initial /78   Pulse 78   Temp 97.9  F (36.6  C) (Oral)   Wt 90.7 kg (200 lb)   SpO2 95%   BMI 30.86 kg/m   Estimated body mass index is 30.86 kg/m  as calculated from the following:    Height as of 11/23/18: 1.715 m (5' 7.5\").    Weight as of this encounter: 90.7 kg (200 lb).  BP completed using cuff size: large         RAPID3 (0-30) Cumulative Score  7.5          RAPID3 Weighted Score (divide #4 by 3 and that is the weighted score)  2.5         "

## 2018-12-13 NOTE — TELEPHONE ENCOUNTER
Rheumatology team: Please call to notify Ms. Sweats that colchicine (mitigare or colcrys) would be diagnostically helpful if the toe/foot pain occurred again, but I understand that it may be cost-prohibitive.  Okay if she does not get it.  Uric acid was normal, suggesting against gout.     Nico Byers MD  12/13/2018 12:33 PM

## 2018-12-13 NOTE — TELEPHONE ENCOUNTER
Contacted Pt, reviewed lab work, as well as relayed message re: medication.  Pt had no questions or concerns, agrees and understands.    Tonya Lennon, [facility]  12/13/2018  12:41 PM

## 2019-01-08 ENCOUNTER — DOCUMENTATION ONLY (OUTPATIENT)
Dept: OPHTHALMOLOGY | Facility: CLINIC | Age: 79
End: 2019-01-08

## 2019-01-17 ENCOUNTER — TELEPHONE (OUTPATIENT)
Dept: OPHTHALMOLOGY | Facility: CLINIC | Age: 79
End: 2019-01-17

## 2019-01-17 NOTE — TELEPHONE ENCOUNTER
"Received a Non-Prescription Drug Expense Verification [facility], regarding Thera Tears.  Notation for:  \"recommended\", \"daily\", \"indicated to treat a condition.\"    Dahlia called from [facility] inquiring about the expense form. Please return a call to discuss [phone]  "

## 2019-01-21 ENCOUNTER — TELEPHONE (OUTPATIENT)
Dept: FAMILY MEDICINE | Facility: CLINIC | Age: 79
End: 2019-01-21

## 2019-01-21 NOTE — TELEPHONE ENCOUNTER
Received fax from [employer] for Non-prescription drug expense verification.   Per Dahiana Flores CNP, would to clarify how often she uses each med listed.     Called and spoke with patient.    Listed meds on sheet are:  1. Clear laxative - doesn't take anymore, this was for colonoscopy  2. Dulcolax - doesn't take anymore, this was for colonoscopy  3. Citrate Lemon - doesn't take anymore, this was for colonoscopy  4. Vitamin C - 1 tablet daily  5. Antacid - uses Tums, takes 1-2 tablets 1 time per week  6. Fish oil - takes 1 tablet daily  7. Aleve - takes every day, sometimes 2 times per day  8. Vitamin D - takes daily  9. Folic acid - takes daily   10. Calcium citrate D3 - takes daily  11. Equaline Sleep 50 mg - takes periodically, takes 1 time per week  12. [employer] vitamin - takes daily  13. Equaline Mucus - takes periodically, 1-3 times per week  14. Tylenol - takes daily (2-4 tablets)  15. Vitamin B12 - takes daily  16. Aspirin - takes 81 mg tablet daily    Placing papers in provider's box.    Kelli Cage RN

## 2019-01-23 NOTE — TELEPHONE ENCOUNTER
Form completed and signed.  Form faxed back to Dahlia Major, Manager at [phone].  Kathleen Hwang,

## 2019-01-25 NOTE — TELEPHONE ENCOUNTER
Dahlia MITCHELL on RN hotline stated that she was out of the office so has been late on responding but received the forms.   If any questions, please call her back    Zackary Pelaez RN

## 2019-02-11 ENCOUNTER — TELEPHONE (OUTPATIENT)
Dept: OPHTHALMOLOGY | Facility: CLINIC | Age: 79
End: 2019-02-11

## 2019-02-11 NOTE — TELEPHONE ENCOUNTER
Spoke with patient - she complains of her eyes hurting all the time, but seem worse when she uses TheraTears  She is not using any lid hygiene specifically, but does rub her eyes each morning with a wash cloth.  I suggested using a hot wash cloth across her eyelids, followed by gently rubbing her eyelids near her eyelashes twice a day, and using Preservative Free artificial tears (generic is ok) at least 4 times a day.  If no improvement after several days of this regimen, she will call and schedule an appointment with Dr. San.

## 2019-02-11 NOTE — TELEPHONE ENCOUNTER
Reason for Call:  Other eye problem    Detailed comments: Patient calls stating that her eyes hurt, she says the more drops she puts in the more they hurt, eye drops are not helping. Please call patient to discuss, thank you.     Phone Number Patient can be reached at: Home number on file [phone] (home)    Best Time: any    Can we leave a detailed message on this number? YES    Call taken on 2/11/2019 at 8:57 AM by ROXIE SHRESTHA

## 2019-02-14 ENCOUNTER — OFFICE VISIT (OUTPATIENT)
Dept: OTOLARYNGOLOGY | Facility: CLINIC | Age: 79
End: 2019-02-14
Payer: COMMERCIAL

## 2019-02-14 VITALS
WEIGHT: 200 LBS | SYSTOLIC BLOOD PRESSURE: 129 MMHG | OXYGEN SATURATION: 94 % | HEIGHT: 68 IN | HEART RATE: 92 BPM | BODY MASS INDEX: 30.31 KG/M2 | DIASTOLIC BLOOD PRESSURE: 79 MMHG

## 2019-02-14 DIAGNOSIS — J34.89 NASAL SORE: Primary | ICD-10-CM

## 2019-02-14 PROCEDURE — 99213 OFFICE O/P EST LOW 20 MIN: CPT | Performed by: OTOLARYNGOLOGY

## 2019-02-14 RX ORDER — IPRATROPIUM BROMIDE 42 UG/1
2 SPRAY, METERED NASAL 4 TIMES DAILY PRN
Qty: 1 BOX | Refills: 3 | Status: SHIPPED | OUTPATIENT
Start: 2019-02-14 | End: 2023-02-06

## 2019-02-14 ASSESSMENT — MIFFLIN-ST. JEOR: SCORE: 1437.75

## 2019-02-14 NOTE — LETTER
2/14/2019         RE: Ginger Marshall  [address]  [place] [address]        Dear Colleague,    Thank you for referring your patient, Ginger Marshall, to the [facility]. Please see a copy of my visit note below.        AgChief Complaint - nasal sore    History of Present Illness - Ginger Marshall is a 78 year old female who returns with a sore in the nose. The patient has noticed for approximately a year. I saw her 6/2018 for this. She has had some bleeding. It waxes and wanes. Had this in the past before I saw her, worse in the winter, but it usually goes away. It can be painful. Uses vaseline 1-2 times per day. She has nasal drainage. nonsmoker. No lumps or swollen glands in the neck.  I saw her last June and she had a 4 mm ulceration in the left nasal sill that I cauterized. Now things are bad again. Her right nose now has the same thing.       Past Medical History -   Patient Active Problem List   Diagnosis     RA (Rheumatoid Arthritis) off Plaquenil     Menopause     History of colonic polyps     Mitral Regurgitation     Multinodular goiter     CARDIOVASCULAR SCREENING; LDL GOAL LESS THAN 130     Psychophysiologic insomnia     Pulmonary nodule     PSEUDOPHAKIA OU     PVD (POSTERIOR VITREOUS DETACHMENT) OU     PXF (PSEUDOEXFOLIATION OF LENS CAPSULE) OD     GERD (gastroesophageal reflux disease)     Hyperlipidemia LDL goal <100     Advance Care Planning     Neuropathy     SHERRY (obstructive sleep apnea)-severe (AHI 35)     zEncounter for counseling     Anemia of chronic disease     Migraine     Osteoporosis     Cornea guttata, ou     Conjunctival concretions     Stenosis, spinal, lumbar     Other chronic pain     Obesity     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     MGD (meibomian gland dysfunction)     Blepharitis of both eyes     Personal history of healed osteoporosis fracture     Iron malabsorption     Hyperglycemia     Chronic bilateral low back pain without  sciatica     Allergic state, subsequent encounter     BMI 32.0-32.9,adult     Spinal stenosis of lumbar region without neurogenic claudication     Herniated nucleus pulposus, L3-4     S/P lumbar fusion     Anxiety     Low iron     BMI 28.0-28.9,adult     History of depression       Current Medications -   Current Outpatient Medications:      acetaminophen (TYLENOL) 500 MG tablet, Take 2 tablets (1,000 mg) by mouth every 8 hours as needed for pain, Disp: 100 tablet, Rfl: 0     Ascorbic Acid 1000 MG TABS, Take 1 tablet by mouth 3 times daily, Disp: , Rfl:      aspirin 81 MG tablet, Take 1 tablet by mouth daily., Disp: , Rfl:      atorvastatin (LIPITOR) 40 MG tablet, TAKE ONE TABLET BY MOUTH EVERY DAY, Disp: 90 tablet, Rfl: 2     atorvastatin (LIPITOR) 40 MG tablet, Take 1 tablet (40 mg) by mouth daily, Disp: 90 tablet, Rfl: 2     Calcium Citrate-Vitamin D (CALCIUM CITRATE + PO), Take 2 tablets by mouth daily, Disp: , Rfl:      Cholecalciferol (VITAMIN D-3 PO), Take 1,000 Units by mouth 2 times daily, Disp: , Rfl:      cyanocobalamin 500 MCG TABS, Take 1 tablet by mouth 2 times daily, Disp: , Rfl:      cyclobenzaprine (FLEXERIL) 10 MG tablet, Take 1 tablet (10 mg) by mouth 2 times daily as needed for muscle spasms (Patient not taking: Reported on 12/12/2018), Disp: 60 tablet, Rfl: 2     doxycycline hyclate (VIBRAMYCIN) 100 MG capsule, Take 1 capsule (100 mg) by mouth 2 times daily, Disp: 14 capsule, Rfl: 0     ferrous gluconate (FERGON) 324 (38 Fe) MG tablet, Take 1 tablet (324 mg) by mouth daily (with breakfast), Disp: 270 tablet, Rfl: 1     FOLIC ACID PO, Take 800 mcg by mouth daily, Disp: , Rfl:      hydroxychloroquine (PLAQUENIL) 200 MG tablet, Take 1 tablet (200 mg) by mouth 2 times daily, Disp: 180 tablet, Rfl: 2     ibuprofen (ADVIL) 200 MG tablet, Take 200 mg by mouth every 4 hours as needed for mild pain, Disp: , Rfl:      ipratropium (ATROVENT) 0.06 % spray, Spray 2 sprays into both nostrils 4 times daily as  "needed (Patient not taking: Reported on 8/8/2018), Disp: 1 Box, Rfl: 3     magnesium oxide (MAG-OX) 400 (241.3 MG) MG tablet, Take 1 tablet (400 mg) by mouth daily, Disp: , Rfl:      methotrexate 2.5 MG tablet, Take 10 tablets (25 mg) by mouth once a week, Disp: 120 tablet, Rfl: 1     multivitamin, therapeutic with minerals (MULTI-VITAMIN) TABS, Take 1 tablet by mouth daily, Disp: , Rfl:      naproxen sodium (ANAPROX) 220 MG tablet, Take 220 mg by mouth 2 times daily (with meals), Disp: , Rfl:      Omega-3 Fatty Acids (OMEGA-3 FISH OIL PO), Take 2,000 mg by mouth daily , Disp: , Rfl:      order for DME, Respironics REMSTAR 60 Series Auto CPAP 12-15 cm H2O, Wisp nasal mask w/a s/m cushion (Patient not taking: Reported on 12/12/2018), Disp: , Rfl:      pantoprazole (PROTONIX) 40 MG EC tablet, TAKE ONE TABLET BY MOUTH EVERY DAY 30-60 MINUTES BEFORE A MEAL, Disp: 90 tablet, Rfl: 2     predniSONE (DELTASONE) 20 MG tablet, PRN RA flare: 20mg daily x5days, then stop., Disp: 5 tablet, Rfl: 1     Pseudoephedrine-Guaifenesin (MUCINEX D PO), , Disp: , Rfl:      tofacitinib (XELJANZ XR) 11 MG 24 hr tablet, Take 1 tablet (11 mg) by mouth daily, Disp: 90 tablet, Rfl: 3     vitamin  B complex with vitamin C (VITAMIN  B COMPLEX) TABS, Take 1 tablet by mouth daily., Disp: , Rfl: 0     VITAMIN E PO, Take 2,000 Units by mouth daily , Disp: , Rfl:     Allergies -   Allergies   Allergen Reactions     Abatacept      Severe headaches     Adhesive Tape      Plastic tape     Celebrex [Celecoxib]      Ineffective     Ethanol      Antihistamines     Orencia [Abatacept]      Headache     Septra [Bactrim]      Sulfa Drugs      \"deathly ill\"     Tramadol      Headache       Social History -   Social History     Social History     Marital status: Single     Spouse name: N/A     Number of children: 3     Years of education: N/A     Occupational History     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     Drug use: No     Sexual activity: No     Other Topics Concern     Parent/Sibling W/ Cabg, Mi Or Angioplasty Before 65f 55m? No     Caffeine Concern Yes     She has 8 cups of coffee in the AM and is done by 8-8:30 AM.     Exercise Yes     Sometimes.  [employer] comes 3 times a week to her building.     Social History Narrative    She lives in a a [employer] apartment building.       Family History -   Family History   Problem Relation Age of Onset     Arthritis Mother      Alzheimer Disease Mother      Hyperlipidemia Mother      Osteoporosis Mother      Heart Disease Father         MI ( from this)     Alcohol/Drug Father      Arthritis Sister      Hypertension Sister      Cancer Son      Diabetes Son      Neurologic Disorder Sister         Schizophrenic     Hypertension Sister      Hyperlipidemia Sister      Mental Illness Sister      Diabetes Son      Other Cancer Son        Review of Systems - As per HPI and PMHx, otherwise 7 system review of the head and neck negative.    Physical Exam  /79   Pulse 92   Ht 1.715 m (5' 7.5\")   Wt 90.7 kg (200 lb)   SpO2 94%   BMI 30.86 kg/m     General - The patient is in no distress.  Alert and oriented x3, answers questions and cooperates with examination appropriately.   Voice and Breathing - The patient was breathing comfortably without the use of accessory muscles. There was no wheezing, stridor, or stertor.  The patients voice was clear and strong.  Eyes - Extraocular movements intact. Sclera were not icteric or injected, conjunctiva were pink and moist.  Neurologic - Cranial nerves II-XII are grossly intact. Specifically, the facial nerve is intact, House-Brackmann grade 1 of 6. Facial sensation is intact and symmetric.  Nose - No significant external deformity. She has " an approximate 4-5 mm superficial ulceration of the left lateral nasal sill and mucosal epithelial junction. She has a small similar lesion on the right too. There is some bloody crust around them.  I cleaned this off with hydrogen peroxide. No heaped up boarders to suggest infection. Further back or septum straight turbinates are normal size no lesions or masses.  Mouth -tongue is midline, no intraoral lesions, some clear to white postnasal drainage.  Neck -  Palpation of the occipital, submental, submandibular, internal jugular chain, and supraclavicular nodes did not demonstrate any abnormal lymph nodes or masses. The parotid glands were without masses. The trachea was midline.    A/P - Ginger Marshall is a 78 year old female with lesions on the lateral nasal sill and mucosal epithelial junction on both sides.  I think what is happening is she has vasomotor rhinitis with constant nasal drip that irritates this area.  She gets the Kleenex up into this area and has had lots of bleeding and scabbing. She rubs it/picks it. Stop Vaseline as she is sneezing and it is causing more nose blowing. I do not want her to get the finger Kleenex up at the site any longer.  I want her to try something different such as vitamin E oil, cocoa butter, or other lotion to try to keep this area moist but not cause her to have to blow her nose so much.  I want her to try a humidifier at night because she notes crusting a lot at night.  In addition I will prescribe her Atrovent nasal spray to try to keep her nose from dripping and irritating this area.  Return in 2-3 months for recheck.          Ry Espinosa MD  Otolaryngology  [facility]  ain, thank you for allowing me to participate in the care of your patient.        Sincerely,        Ry Espinosa MD

## 2019-02-14 NOTE — PROGRESS NOTES
Chief Complaint - nasal sore    History of Present Illness - Ginger Marshall is a 78 year old female who returns with a sore in the nose. The patient has noticed for approximately a year. I saw her 6/2018 for this. She has had some bleeding. It waxes and wanes. Had this in the past before I saw her, worse in the winter, but it usually goes away. It can be painful. Uses vaseline 1-2 times per day. She has nasal drainage. nonsmoker. No lumps or swollen glands in the neck.  I saw her last June and she had a 4 mm ulceration in the left nasal sill that I cauterized. Now things are bad again. Her right nose now has the same thing.       Past Medical History -   Patient Active Problem List   Diagnosis     RA (Rheumatoid Arthritis) off Plaquenil     Menopause     History of colonic polyps     Mitral Regurgitation     Multinodular goiter     CARDIOVASCULAR SCREENING; LDL GOAL LESS THAN 130     Psychophysiologic insomnia     Pulmonary nodule     PSEUDOPHAKIA OU     PVD (POSTERIOR VITREOUS DETACHMENT) OU     PXF (PSEUDOEXFOLIATION OF LENS CAPSULE) OD     GERD (gastroesophageal reflux disease)     Hyperlipidemia LDL goal <100     Advance Care Planning     Neuropathy     SHERRY (obstructive sleep apnea)-severe (AHI 35)     zEncounter for counseling     Anemia of chronic disease     Migraine     Osteoporosis     Cornea guttata, ou     Conjunctival concretions     Stenosis, spinal, lumbar     Other chronic pain     Obesity     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     MGD (meibomian gland dysfunction)     Blepharitis of both eyes     Personal history of healed osteoporosis fracture     Iron malabsorption     Hyperglycemia     Chronic bilateral low back pain without sciatica     Allergic state, subsequent encounter     BMI 32.0-32.9,adult     Spinal stenosis of lumbar region without neurogenic claudication     Herniated nucleus pulposus, L3-4     S/P lumbar fusion     Anxiety     Low iron     BMI 28.0-28.9,adult     History  of depression       Current Medications -   Current Outpatient Medications:      acetaminophen (TYLENOL) 500 MG tablet, Take 2 tablets (1,000 mg) by mouth every 8 hours as needed for pain, Disp: 100 tablet, Rfl: 0     Ascorbic Acid 1000 MG TABS, Take 1 tablet by mouth 3 times daily, Disp: , Rfl:      aspirin 81 MG tablet, Take 1 tablet by mouth daily., Disp: , Rfl:      atorvastatin (LIPITOR) 40 MG tablet, TAKE ONE TABLET BY MOUTH EVERY DAY, Disp: 90 tablet, Rfl: 2     atorvastatin (LIPITOR) 40 MG tablet, Take 1 tablet (40 mg) by mouth daily, Disp: 90 tablet, Rfl: 2     Calcium Citrate-Vitamin D (CALCIUM CITRATE + PO), Take 2 tablets by mouth daily, Disp: , Rfl:      Cholecalciferol (VITAMIN D-3 PO), Take 1,000 Units by mouth 2 times daily, Disp: , Rfl:      cyanocobalamin 500 MCG TABS, Take 1 tablet by mouth 2 times daily, Disp: , Rfl:      cyclobenzaprine (FLEXERIL) 10 MG tablet, Take 1 tablet (10 mg) by mouth 2 times daily as needed for muscle spasms (Patient not taking: Reported on 12/12/2018), Disp: 60 tablet, Rfl: 2     doxycycline hyclate (VIBRAMYCIN) 100 MG capsule, Take 1 capsule (100 mg) by mouth 2 times daily, Disp: 14 capsule, Rfl: 0     ferrous gluconate (FERGON) 324 (38 Fe) MG tablet, Take 1 tablet (324 mg) by mouth daily (with breakfast), Disp: 270 tablet, Rfl: 1     FOLIC ACID PO, Take 800 mcg by mouth daily, Disp: , Rfl:      hydroxychloroquine (PLAQUENIL) 200 MG tablet, Take 1 tablet (200 mg) by mouth 2 times daily, Disp: 180 tablet, Rfl: 2     ibuprofen (ADVIL) 200 MG tablet, Take 200 mg by mouth every 4 hours as needed for mild pain, Disp: , Rfl:      ipratropium (ATROVENT) 0.06 % spray, Spray 2 sprays into both nostrils 4 times daily as needed (Patient not taking: Reported on 8/8/2018), Disp: 1 Box, Rfl: 3     magnesium oxide (MAG-OX) 400 (241.3 MG) MG tablet, Take 1 tablet (400 mg) by mouth daily, Disp: , Rfl:      methotrexate 2.5 MG tablet, Take 10 tablets (25 mg) by mouth once a week, Disp:  "120 tablet, Rfl: 1     multivitamin, therapeutic with minerals (MULTI-VITAMIN) TABS, Take 1 tablet by mouth daily, Disp: , Rfl:      naproxen sodium (ANAPROX) 220 MG tablet, Take 220 mg by mouth 2 times daily (with meals), Disp: , Rfl:      Omega-3 Fatty Acids (OMEGA-3 FISH OIL PO), Take 2,000 mg by mouth daily , Disp: , Rfl:      order for DME, Respironics REMSTAR 60 Series Auto CPAP 12-15 cm H2O, Wisp nasal mask w/a s/m cushion (Patient not taking: Reported on 12/12/2018), Disp: , Rfl:      pantoprazole (PROTONIX) 40 MG EC tablet, TAKE ONE TABLET BY MOUTH EVERY DAY 30-60 MINUTES BEFORE A MEAL, Disp: 90 tablet, Rfl: 2     predniSONE (DELTASONE) 20 MG tablet, PRN RA flare: 20mg daily x5days, then stop., Disp: 5 tablet, Rfl: 1     Pseudoephedrine-Guaifenesin (MUCINEX D PO), , Disp: , Rfl:      tofacitinib (XELJANZ XR) 11 MG 24 hr tablet, Take 1 tablet (11 mg) by mouth daily, Disp: 90 tablet, Rfl: 3     vitamin  B complex with vitamin C (VITAMIN  B COMPLEX) TABS, Take 1 tablet by mouth daily., Disp: , Rfl: 0     VITAMIN E PO, Take 2,000 Units by mouth daily , Disp: , Rfl:     Allergies -   Allergies   Allergen Reactions     Abatacept      Severe headaches     Adhesive Tape      Plastic tape     Celebrex [Celecoxib]      Ineffective     Ethanol      Antihistamines     Orencia [Abatacept]      Headache     Septra [Bactrim]      Sulfa Drugs      \"deathly ill\"     Tramadol      Headache       Social History -   Social History     Social History     Marital status: Single     Spouse name: N/A     Number of children: 3     Years of education: N/A     Occupational History     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     Drug use: No     Sexual activity: No     Other Topics Concern     Parent/Sibling " "W/ Cabg, Mi Or Angioplasty Before 65f 55m? No     Caffeine Concern Yes     She has 8 cups of coffee in the AM and is done by 8-8:30 AM.     Exercise Yes     Sometimes.  [employer] comes 3 times a week to her building.     Social History Narrative    She lives in a a senior living apartment building.       Family History -   Family History   Problem Relation Age of Onset     Arthritis Mother      Alzheimer Disease Mother      Hyperlipidemia Mother      Osteoporosis Mother      Heart Disease Father         MI ( from this)     Alcohol/Drug Father      Arthritis Sister      Hypertension Sister      Cancer Son      Diabetes Son      Neurologic Disorder Sister         Schizophrenic     Hypertension Sister      Hyperlipidemia Sister      Mental Illness Sister      Diabetes Son      Other Cancer Son        Review of Systems - As per HPI and PMHx, otherwise 7 system review of the head and neck negative.    Physical Exam  /79   Pulse 92   Ht 1.715 m (5' 7.5\")   Wt 90.7 kg (200 lb)   SpO2 94%   BMI 30.86 kg/m    General - The patient is in no distress.  Alert and oriented x3, answers questions and cooperates with examination appropriately.   Voice and Breathing - The patient was breathing comfortably without the use of accessory muscles. There was no wheezing, stridor, or stertor.  The patients voice was clear and strong.  Eyes - Extraocular movements intact. Sclera were not icteric or injected, conjunctiva were pink and moist.  Neurologic - Cranial nerves II-XII are grossly intact. Specifically, the facial nerve is intact, House-Brackmann grade 1 of 6. Facial sensation is intact and symmetric.  Nose - No significant external deformity. She has an approximate 4-5 mm superficial ulceration of the left lateral nasal sill and mucosal epithelial junction. She has a small similar lesion on the right too. There is some bloody crust around them.  I cleaned this off with hydrogen peroxide. No heaped up boarders to " suggest infection. Further back or septum straight turbinates are normal size no lesions or masses.  Mouth -tongue is midline, no intraoral lesions, some clear to white postnasal drainage.  Neck -  Palpation of the occipital, submental, submandibular, internal jugular chain, and supraclavicular nodes did not demonstrate any abnormal lymph nodes or masses. The parotid glands were without masses. The trachea was midline.    A/P - Ginger Marshall is a 78 year old female with lesions on the lateral nasal sill and mucosal epithelial junction on both sides.  I think what is happening is she has vasomotor rhinitis with constant nasal drip that irritates this area.  She gets the Kleenex up into this area and has had lots of bleeding and scabbing. She rubs it/picks it. Stop Vaseline as she is sneezing and it is causing more nose blowing. I do not want her to get the finger Kleenex up at the site any longer.  I want her to try something different such as vitamin E oil, cocoa butter, or other lotion to try to keep this area moist but not cause her to have to blow her nose so much.  I want her to try a humidifier at night because she notes crusting a lot at night.  In addition I will prescribe her Atrovent nasal spray to try to keep her nose from dripping and irritating this area.  Return in 2-3 months for recheck.          Ry Espinosa MD  Otolaryngology  [facility]

## 2019-02-26 ENCOUNTER — DOCUMENTATION ONLY (OUTPATIENT)
Dept: OTHER | Facility: CLINIC | Age: 79
End: 2019-02-26

## 2019-03-06 ENCOUNTER — TELEPHONE (OUTPATIENT)
Dept: FAMILY MEDICINE | Facility: CLINIC | Age: 79
End: 2019-03-06

## 2019-03-06 ENCOUNTER — OFFICE VISIT (OUTPATIENT)
Dept: RHEUMATOLOGY | Facility: CLINIC | Age: 79
End: 2019-03-06
Payer: COMMERCIAL

## 2019-03-06 VITALS
OXYGEN SATURATION: 94 % | DIASTOLIC BLOOD PRESSURE: 77 MMHG | HEART RATE: 75 BPM | BODY MASS INDEX: 30.31 KG/M2 | WEIGHT: 200 LBS | SYSTOLIC BLOOD PRESSURE: 124 MMHG | HEIGHT: 68 IN

## 2019-03-06 DIAGNOSIS — M05.79 RHEUMATOID ARTHRITIS INVOLVING MULTIPLE SITES WITH POSITIVE RHEUMATOID FACTOR (H): Primary | ICD-10-CM

## 2019-03-06 DIAGNOSIS — Z79.899 HIGH RISK MEDICATIONS (NOT ANTICOAGULANTS) LONG-TERM USE: ICD-10-CM

## 2019-03-06 DIAGNOSIS — M81.0 OSTEOPOROSIS WITHOUT CURRENT PATHOLOGICAL FRACTURE, UNSPECIFIED OSTEOPOROSIS TYPE: ICD-10-CM

## 2019-03-06 LAB
ALBUMIN SERPL-MCNC: 4 G/DL (ref 3.4–5)
ALP SERPL-CCNC: 42 U/L (ref 40–150)
ALT SERPL W P-5'-P-CCNC: 32 U/L (ref 0–50)
AST SERPL W P-5'-P-CCNC: 23 U/L (ref 0–45)
BASOPHILS # BLD AUTO: 0 10E9/L (ref 0–0.2)
BASOPHILS NFR BLD AUTO: 0.2 %
BILIRUB DIRECT SERPL-MCNC: 0.1 MG/DL (ref 0–0.2)
BILIRUB SERPL-MCNC: 0.3 MG/DL (ref 0.2–1.3)
CALCIUM SERPL-MCNC: 9.3 MG/DL (ref 8.5–10.1)
CREAT SERPL-MCNC: 0.64 MG/DL (ref 0.52–1.04)
CRP SERPL-MCNC: <2.9 MG/L (ref 0–8)
DIFFERENTIAL METHOD BLD: NORMAL
EOSINOPHIL # BLD AUTO: 0.3 10E9/L (ref 0–0.7)
EOSINOPHIL NFR BLD AUTO: 5.5 %
ERYTHROCYTE [DISTWIDTH] IN BLOOD BY AUTOMATED COUNT: 13.7 % (ref 10–15)
ERYTHROCYTE [SEDIMENTATION RATE] IN BLOOD BY WESTERGREN METHOD: 22 MM/H (ref 0–30)
GFR SERPL CREATININE-BSD FRML MDRD: 86 ML/MIN/{1.73_M2}
HCT VFR BLD AUTO: 38.5 % (ref 35–47)
HGB BLD-MCNC: 12.2 G/DL (ref 11.7–15.7)
LYMPHOCYTES # BLD AUTO: 1.5 10E9/L (ref 0.8–5.3)
LYMPHOCYTES NFR BLD AUTO: 27.4 %
MCH RBC QN AUTO: 31 PG (ref 26.5–33)
MCHC RBC AUTO-ENTMCNC: 31.7 G/DL (ref 31.5–36.5)
MCV RBC AUTO: 98 FL (ref 78–100)
MONOCYTES # BLD AUTO: 0.9 10E9/L (ref 0–1.3)
MONOCYTES NFR BLD AUTO: 16.6 %
NEUTROPHILS # BLD AUTO: 2.8 10E9/L (ref 1.6–8.3)
NEUTROPHILS NFR BLD AUTO: 50.3 %
PLATELET # BLD AUTO: 335 10E9/L (ref 150–450)
PROT SERPL-MCNC: 7.6 G/DL (ref 6.8–8.8)
RBC # BLD AUTO: 3.94 10E12/L (ref 3.8–5.2)
WBC # BLD AUTO: 5.5 10E9/L (ref 4–11)

## 2019-03-06 PROCEDURE — 85025 COMPLETE CBC W/AUTO DIFF WBC: CPT | Performed by: INTERNAL MEDICINE

## 2019-03-06 PROCEDURE — 36415 COLL VENOUS BLD VENIPUNCTURE: CPT | Performed by: INTERNAL MEDICINE

## 2019-03-06 PROCEDURE — 99214 OFFICE O/P EST MOD 30 MIN: CPT | Performed by: INTERNAL MEDICINE

## 2019-03-06 PROCEDURE — 86140 C-REACTIVE PROTEIN: CPT | Performed by: INTERNAL MEDICINE

## 2019-03-06 PROCEDURE — 85652 RBC SED RATE AUTOMATED: CPT | Performed by: INTERNAL MEDICINE

## 2019-03-06 PROCEDURE — 82306 VITAMIN D 25 HYDROXY: CPT | Performed by: INTERNAL MEDICINE

## 2019-03-06 PROCEDURE — 82565 ASSAY OF CREATININE: CPT | Performed by: INTERNAL MEDICINE

## 2019-03-06 PROCEDURE — 82310 ASSAY OF CALCIUM: CPT | Performed by: INTERNAL MEDICINE

## 2019-03-06 PROCEDURE — 80076 HEPATIC FUNCTION PANEL: CPT | Performed by: INTERNAL MEDICINE

## 2019-03-06 RX ORDER — METHOTREXATE 25 MG/ML
25 INJECTION INTRA-ARTERIAL; INTRAMUSCULAR; INTRATHECAL; INTRAVENOUS
Qty: 4 VIAL | Refills: 3 | Status: SHIPPED | OUTPATIENT
Start: 2019-03-06 | End: 2019-03-29

## 2019-03-06 RX ORDER — PREDNISONE 5 MG/1
TABLET ORAL
Qty: 125 TABLET | Refills: 0 | Status: SHIPPED | OUTPATIENT
Start: 2019-03-06 | End: 2019-06-05

## 2019-03-06 RX ORDER — SYRINGE-NEEDLE,INSULIN,0.5 ML 27GX1/2"
SYRINGE, EMPTY DISPOSABLE MISCELLANEOUS
Qty: 10 EACH | Refills: 6 | Status: SHIPPED | OUTPATIENT
Start: 2019-03-06 | End: 2019-03-29

## 2019-03-06 ASSESSMENT — MIFFLIN-ST. JEOR: SCORE: 1437.75

## 2019-03-06 NOTE — NURSING NOTE
RAPID3 (0-30) Cumulative Score  16.0          RAPID3 Weighted Score (divide #4 by 3 and that is the weighted score)  5.3     Samantha Morales [facility] Rheumatology  3/6/2019 12:51 PM

## 2019-03-06 NOTE — PROGRESS NOTES
Rheumatology Clinic Visit      Ginger Marshall MRN# [id number]   YOB: 1940 Age: 78 year old      Date of visit: 3/06/19   PCP: Georgia Vázquez MD     Chief Complaint   Patient presents with:  Arthritis: RA,    Assessment and Plan   1. Seropositive ( [2009], CCP >100 [2009]; hx of rheumatoid nodule by 6/14/2011 pathology) Erosive Rheumatoid Arthritis: Previously failed remicade (staph infection during therapy, but was immediately after a joint injection) and orencia (migraines).  Sulfa allergy.  Currently on methotrexate 25 mg once weekly, folic acid 800 mcg daily, Xeljanz XR 11mg daily, HCQ 200mg BID. She also has prednisone 20 mg daily ×5 days to use as needed for flare. Methotrexate was reduced in the past with worsening symptoms.   Note that she had a history of oral sore that resolved with folic acid 2mg daily; she ran out of Rx'd folic acid and did not want to restart it yet; she takes other supplements that have a total of 800mcg of folic acid; if needed in the future may restart additional folic acid.  Previously she was doing well but for the last 1 month she has had worsening pain in her MCPs and PIPs with active synovitis seen on exam today.  She says that she has hesitant to move away from Xeljanz because it has worked so well; we discussed changing Xeljanz to a different medication.  We also discussed changing methotrexate from an oral to a subcutaneous route of administration that would increase bioavailability of methotrexate that we have found to be effective for her.  She prefers to try the subcutaneous route of methotrexate first and if this is not effective at reducing her joint pain then will consider changing to Simponi, Humira, or Enbrel.  Prednisone taper for her current flare; she was using only 5 days of prednisone 20 mg daily that was effective but symptoms returned afterward.  Hopefully with the longer prednisone taper and the change of methotrexate, her disease will be well  controlled after being on subcutaneous methotrexate for a couple months.  - Change methotrexate from 25 mg PO once weekly, to 25mg SQ once weekly  - Continue folic acid 800mcg daily as noted above (from over-the-counter supplements)  - Continue hydroxychloroquine 200mg BID (eye exam pending; ophthalmology referral previously given)  - Continue Xeljanz XR 11mg daily  - Start Prednisone 20mg daily x5days, then 15mg daily x5days, then 10mg daily x30days, then 5mg daily x30days, then stop  - Labs monthly [id number]: CBC, Cr, Hepatic Panel   - Labs every 3 months: CBC, Creatinine, Hepatic Panel              Rapid 3, cumulative scores                       03/06/2019: ?    (MTX 25mg PO wkly, HCQ 400mg daily, Xeljanz XR 11mg daily)                       12/12/2018: 7.5 (MTX 25mg wkly, HCQ 400mg daily, Xeljanz XR 11mg daily)                       08/08/2018: 7    (MTX 22.5mg wkly, Xeljanz XR 11mg daily)                       02/07/2018: 4    (MTX 22.5mg wkly, Xeljanz XR 11mg daily)                       11/08/2017: 5    (MTX 22.5mg wkly, Xeljanz XR 11mg daily)                       08/09/2017: 0    (MTX 22.5mg wkly, Xeljanz XR 11mg daily)                      05/10/2017: 5    (MTX 25mg wkly, Xeljanz XR 11mg daily) RA doing well    2. Right hip pain: Status post WALTER twice in the past. Followed by [facility] orthopedics.     3. Osteoporosis: was previously managed by endocrinology and she received reclast once in 2016.  She does not want to use any osteoporosis medication at this time.  We discussed the tx options; risks of treatment and risks of not treating.  12/12/2018 DEXA ordered by Dr. Vázquez showed osteoporosis.    - Labs: Ca, Vitamin     4. Iron Deficiency Anemia: Managed by PCP.     5. Left 1st toe pain: 2 episodes of sudden onset left toe pain followed by diffuse left foot pain that made ambulation difficult.  Also with nodules over both Achilles tendons and the right elbow that are either rheumatoid nodules or tophi.   She reports having history of gout decades ago.  Denies ever being on colchicine or allopurinol.  Discussed colchicine.  Check uric acid today.  - Colchicine: (MITIGARE) 0.6 MG capsule; Take 2 capsules (1.2 mg) by mouth once for 1 dose at the onset of a gout flare, followed by 1 capsule (0.6mg) 1 hour later.  Dispense: 3 capsule; Refill: 0    6.  Metatarsalgia: Worse on the left.  Advised orthotics previously. Not discussed today.    7.  Vaccinations: Vaccinations reviewed with Ms. Marshall.  Risks and benefits of vaccinations were discussed. Data and lack of data for shingrix reviewed.  CDC stance on shingrix when on moderate to high immunosuppression reviewed.   - Influenza: up to date per patient  - [id number]: up to date  - [id number]: up to date  - Shingrix: advised getting at the pharmacy     Ms. Marshall verbalized agreement with and understanding of the rational for the diagnosis and treatment plan.  All questions were answered to best of my ability and the patient's satisfaction. Ms. Marshall was advised to contact the clinic with any questions that may arise after the clinic visit.      Thank you for involving me in the care of the patient    Return to clinic: 3-4 months    HPI   Ginger Marshall is a 78 year old female with a history of multiple foot surgeries, hand tendon transfers, MCP replacements (most recent being in August 2015), bilateral TKA, right WALTER, left distal radius fracture history, gout, s/p lumbar fusion, osteoporosis and seropositive (RF+, CCP+) erosive rheumatoid arthritis who presents for follow-up of rheumatoid arthritis.      Today, Ms. Marshall reports that she was doing great on her medications with the past 1-2 months she has been having worsening pain and stiffness.  Swelling in her MCPs.  Stiffness lasting for at least 1 hour.  Prednisone has been used twice where she takes 20 mg daily for 5 days and each time she takes prednisone it takes about 2-3 days for her pain to resolve but when  she stops prednisone her pain comes back.  Regarding osteoporosis, she says that she no longer follows with endocrinology because they gave her Reclast once and she does not want to take any osteoporosis medications again because she is afraid of what they may do to her.  She says that she would rather not take anything for osteoporosis at this time.    Denies fevers, chills, nausea, vomiting, constipation, diarrhea. No abdominal pain. No chest pain/pressure, palpitations, or shortness of breath. No nasal or oral sores.   No neck pain. No rash. No LE swelling.     Tobacco: quit in 1999  EtOH: 1 glass of wine every month at most  Drugs: None  Occupation: , retired     ROS   GEN: No fevers, chills  SKIN: No itching, rashes, sores  HEENT:  No oral or nasal ulcers.  CV: No chest pain, pressure, palpitations, or dyspnea on exertion.  PULM: No SOB, wheeze, cough.  GI:  No nausea, vomiting, constipation, diarrhea. No blood in stool. No abdominal pain.  : No blood in urine.  MSK: See HPI.  NEURO: No numbness or tingling  EXT: No LE swelling    Active Problem List     Patient Active Problem List   Diagnosis     RA (Rheumatoid Arthritis) off Plaquenil     Menopause     History of colonic polyps     Mitral Regurgitation     Multinodular goiter     CARDIOVASCULAR SCREENING; LDL GOAL LESS THAN 130     Psychophysiologic insomnia     Pulmonary nodule     PSEUDOPHAKIA OU     PVD (POSTERIOR VITREOUS DETACHMENT) OU     PXF (PSEUDOEXFOLIATION OF LENS CAPSULE) OD     GERD (gastroesophageal reflux disease)     Hyperlipidemia LDL goal <100     Advance Care Planning     Neuropathy     SHERRY (obstructive sleep apnea)-severe (AHI 35)     zEncounter for counseling     Anemia of chronic disease     Migraine     Osteoporosis     Cornea guttata, ou     Conjunctival concretions     Stenosis, spinal, lumbar     Other chronic pain     Obesity     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     MGD (meibomian gland  dysfunction)     Blepharitis of both eyes     Personal history of healed osteoporosis fracture     Iron malabsorption     Hyperglycemia     Chronic bilateral low back pain without sciatica     Allergic state, subsequent encounter     BMI 32.0-32.9,adult     Spinal stenosis of lumbar region without neurogenic claudication     Herniated nucleus pulposus, L3-4     S/P lumbar fusion     Anxiety     Low iron     BMI 28.0-28.9,adult     History of depression     Past Medical History     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     History of total knee replacement 7/23/2009     Menopause late 40's     Other chronic pain     joints     Pelvic fracture (H) 5/13/2014     PUD (peptic ulcer disease)      Rheumatoid arteritis      Sleep apnea     Uses a CPAP     Vitamin B12 deficiency      Past Surgical History     Past Surgical History:   Procedure Laterality Date     ABDOMEN SURGERY      c-sections     ARTHROSCOPY KNEE RT/LT  01/06    left     BACK SURGERY  2013    disc     BREAST BIOPSY, RT/LT      left benign     BREAST SURGERY  90's    lumpectomy     C ANESTH,TOTAL HIP ARTHROPLASTY  2010    Rt hip     C HAND/FINGER SURGERY UNLISTED  11/05    right hand     C NONSPECIFIC PROCEDURE  91    left foot surgery     C NONSPECIFIC PROCEDURE  95    R MCP surgery     C TOTAL KNEE ARTHROPLASTY  2006    left     C/SECTION, LOW TRANSVERSE  66,72    x 2     CATARACT IOL, RT/LT       COLONOSCOPY  2006,2009     EYE SURGERY      cataracs     HC ESOPH/GAS REFLUX TEST W NASAL IMPED >1 HR  2/1/2012    Procedure:ESOPHAGEAL IMPEDENCE FUNCTION TEST WITH 24 HOUR PH GREATER THAN 1 HOUR; Surgeon:KAYKAY JULIO; Location:[facility]     IR CAUDAL EPIDURAL INJECTION SINGLE  5/2/2012    LESI L5-S1 at [facility]     OPTICAL TRACKING SYSTEM FUSION POSTERIOR SPINE LUMBAR N/A 4/3/2017    Procedure: OPTICAL TRACKING  "SYSTEM FUSION SPINE POSTERIOR LUMBAR ONE LEVEL;  Surgeon: Anthony Michele MD;  Location: [facility]     SURGICAL HISTORY OF -   2007    right knee total replacement     Allergy     Allergies   Allergen Reactions     Abatacept      Severe headaches     Adhesive Tape      Plastic tape     Celebrex [Celecoxib]      Ineffective     Ethanol      Antihistamines     Orencia [Abatacept]      Headache     Septra [Bactrim]      Sulfa Drugs      \"deathly ill\"     Tramadol      Headache     Current Medication List     Current Outpatient Medications   Medication Sig     acetaminophen (TYLENOL) 500 MG tablet Take 2 tablets (1,000 mg) by mouth every 8 hours as needed for pain     Ascorbic Acid 1000 MG TABS Take 1 tablet by mouth 3 times daily     aspirin 81 MG tablet Take 1 tablet by mouth daily.     atorvastatin (LIPITOR) 40 MG tablet TAKE ONE TABLET BY MOUTH EVERY DAY     atorvastatin (LIPITOR) 40 MG tablet Take 1 tablet (40 mg) by mouth daily     Calcium Citrate-Vitamin D (CALCIUM CITRATE + PO) Take 2 tablets by mouth daily     Cholecalciferol (VITAMIN D-3 PO) Take 1,000 Units by mouth 2 times daily     cyanocobalamin 500 MCG TABS Take 1 tablet by mouth 2 times daily     cyclobenzaprine (FLEXERIL) 10 MG tablet Take 1 tablet (10 mg) by mouth 2 times daily as needed for muscle spasms (Patient not taking: Reported on 12/12/2018)     doxycycline hyclate (VIBRAMYCIN) 100 MG capsule Take 1 capsule (100 mg) by mouth 2 times daily     ferrous gluconate (FERGON) 324 (38 Fe) MG tablet Take 1 tablet (324 mg) by mouth daily (with breakfast)     FOLIC ACID PO Take 800 mcg by mouth daily     hydroxychloroquine (PLAQUENIL) 200 MG tablet Take 1 tablet (200 mg) by mouth 2 times daily     ibuprofen (ADVIL) 200 MG tablet Take 200 mg by mouth every 4 hours as needed for mild pain     ipratropium (ATROVENT) 0.06 % nasal spray Spray 2 sprays into both nostrils 4 times daily as needed for rhinitis     ipratropium (ATROVENT) 0.06 % spray Spray 2 " sprays into both nostrils 4 times daily as needed (Patient not taking: Reported on 2018)     magnesium oxide (MAG-OX) 400 (241.3 MG) MG tablet Take 1 tablet (400 mg) by mouth daily     methotrexate 2.5 MG tablet Take 10 tablets (25 mg) by mouth once a week     multivitamin, therapeutic with minerals (MULTI-VITAMIN) TABS Take 1 tablet by mouth daily     naproxen sodium (ANAPROX) 220 MG tablet Take 220 mg by mouth 2 times daily (with meals)     Omega-3 Fatty Acids (OMEGA-3 FISH OIL PO) Take 2,000 mg by mouth daily      order for [facility] REMSTAR 60 Series Auto CPAP 12-15 cm H2O, Wisp nasal mask w/a s/m cushion (Patient not taking: Reported on 2018)     pantoprazole (PROTONIX) 40 MG EC tablet TAKE ONE TABLET BY MOUTH EVERY DAY 30-60 MINUTES BEFORE A MEAL     predniSONE (DELTASONE) 20 MG tablet PRN RA flare: 20mg daily x5days, then stop.     Pseudoephedrine-Guaifenesin (MUCINEX D PO)      tofacitinib (XELJANZ XR) 11 MG 24 hr tablet Take 1 tablet (11 mg) by mouth daily     vitamin  B complex with vitamin C (VITAMIN  B COMPLEX) TABS Take 1 tablet by mouth daily.     VITAMIN E PO Take 2,000 Units by mouth daily      No current facility-administered medications for this visit.      Social History   See HPI    Family History     Family History   Problem Relation Age of Onset     Arthritis Mother      Alzheimer Disease Mother      Hyperlipidemia Mother      Osteoporosis Mother      Heart Disease Father         MI ( from this)     Alcohol/Drug Father      Arthritis Sister      Hypertension Sister      Cancer Son      Diabetes Son      Neurologic Disorder Sister         Schizophrenic     Hypertension Sister      Hyperlipidemia Sister      Mental Illness Sister      Diabetes Son      Other Cancer Son      No change in family history since the previous clinic visit.    Physical Exam     Temp Readings from Last 3 Encounters:   18 97.9  F (36.6  C) (Oral)   18 97  F (36.1  C) (Oral)   11/15/18 98.4  " F (36.9  C) (Oral)     BP Readings from Last 5 Encounters:   02/14/19 129/79   12/12/18 131/78   11/23/18 117/74   11/15/18 (!) 147/93   08/08/18 123/79     Pulse Readings from Last 1 Encounters:   02/14/19 92     Resp Readings from Last 1 Encounters:   11/23/18 20     Estimated body mass index is 30.86 kg/m  as calculated from the following:    Height as of 2/14/19: 1.715 m (5' 7.5\").    Weight as of 2/14/19: 90.7 kg (200 lb).    GEN: NAD   HEENT: MMM. No oral lesions. Anicteric, noninjected sclera  CV: S1, S2. RRR. No m/r/g.  PULM: CTA bilaterally. No w/c.  MSK: Synovial swelling and tenderness palpation the bilateral second-fourth MCPs and tenderness palpation without swelling of the bilateral PIPs.   Subluxation of the bilateral MCPs. Right hand with old surgical scars over the MCPs.   Wrists, elbows, shoulders, knees, ankles, and MTPs without swelling or tenderness palpation.     SKIN: No rash  EXT: No LE edema  PSYCH: Alert. Appropriate.     Labs   RF/CCP  Recent Labs   Lab Test 09/09/11  0843   RHF 38*     CBC  Recent Labs   Lab Test 12/10/18  1051 11/23/18  1309 08/06/18  1055   WBC 4.5 5.8 6.7   RBC 3.63* 3.89 3.89   HGB 11.5* 12.3 12.4   HCT 35.8 38.4 37.8   MCV 99 99 97   RDW 13.9 14.1 13.8    361 376   MCH 31.7 31.6 31.9   MCHC 32.1 32.0 32.8   NEUTROPHIL 49.7 54.8 63.5   LYMPH 29.7 25.6 22.1   MONOCYTE 14.5 13.4 10.4   EOSINOPHIL 5.9 6.0 3.9   BASOPHIL 0.2 0.2 0.1   ANEU 2.3 3.2 4.3   ALYM 1.4 1.5 1.5   MARYURI 0.7 0.8 0.7   AEOS 0.3 0.4 0.3   ABAS 0.0 0.0 0.0     CMP  Recent Labs   Lab Test 12/10/18  1051 11/23/18  1309 08/06/18  1055  03/27/17  1142  01/27/17  1511   NA  --  141  --   --  139  --  142   POTASSIUM  --  3.9  --   --  4.0  --  4.1   CHLORIDE  --  107  --   --  107  --  107   CO2  --  29  --   --  25  --  25   ANIONGAP  --  5  --   --  7  --  10   GLC  --  123*  --   --  96  --  98   BUN  --  14  --   --  11  --  10   CR 0.64 0.59 0.65   < > 0.62   < > 0.62   GFRESTIMATED >90 >90 88  " " < > >90  Non  GFR Calc     < > >90  Non  GFR Calc     GFRESTBLACK >90 >90 >90   < > >90  African American GFR Calc     < > >90   GFR Calc     BRANDEE  --  9.2  --   --  9.7  --  9.9   BILITOTAL 0.3 0.3 0.3   < >  --    < > 0.3   ALBUMIN 3.7 3.9 3.7   < >  --    < > 3.8   PROTTOTAL 7.2 7.6 7.9   < >  --    < > 7.6   ALKPHOS 40 55 50   < >  --    < > 45   AST 33 29 24   < >  --    < > 35   ALT 40 33 30   < >  --    < > 39    < > = values in this interval not displayed.     Calcium/VitaminD  Recent Labs   Lab Test 11/23/18  1309 03/27/17  1142 01/27/17  1511 10/28/16  1239  02/05/13  1050   BRANDEE 9.2 9.7 9.9 9.4   < > 9.3   VITDT  --   --   --  37  --  33    < > = values in this interval not displayed.     ESR/CRP  Recent Labs   Lab Test 12/10/18  1051 08/06/18  1055 02/05/18  1021   SED 17 49* 25   CRP <2.9 3.4 2.9     Lipid Panel  Recent Labs   Lab Test 11/23/18  1309 12/14/17  0957 10/28/16  1238 12/30/15  0842 01/13/14  1109 07/24/13  1024  04/13/12  0831   CHOL  --  164 176 182 135 177  --  167   TRIG  --  121 103 70 89 80  --  115   HDL  --  79 77 92 49* 89  --  59   LDL 72 61 78 76 68 72   < > 85   VLDL  --   --   --   --  18 16  --  23   CHOLHDLRATIO  --   --   --   --  2.8 2.0  --  2.8   NHDL  --  85 99 90  --   --   --   --     < > = values in this interval not displayed.     Hepatitis B  Recent Labs   Lab Test 09/15/15  1159 04/30/12  1005   AUSAB 0.11  --    HBCAB Nonreactive  --    HEPBANG Nonreactive Negative     Hepatitis C  Recent Labs   Lab Test 09/15/15  1159 04/30/12  1005   HCVAB Nonreactive   Assay performance characteristics have not been established for newborns,   infants, and children   Negative       Tuberculosis Screening  Recent Labs   Lab Test 05/07/18  1033 09/15/15  1200 04/30/12  1006   TBRSLT Negative Negative Negative   TBAGN 0.00 0.00 0.00     \"HAND BILATERAL THREE OR MORE VIEWS 9/15/2015 12:28 PM   HISTORY: Rheumatoid arthritis; establish " "baseline. Rheumatoid  arthritis(714.0)  COMPARISON: None  IMPRESSION  IMPRESSION: There is diffuse osteopenia. Postoperative changes of the  right second and third metacarpophalangeal joints. Moderate joint  space narrowing involving the left second and third  metacarpophalangeal joints. Mild joint space narrowing of the right  fourth and fifth metacarpophalangeal joints. There are also small  periarticular erosive changes of the metacarpophalangeal joints. There  is fusion of several of the right carpal bones. Marked joint space  loss in the left wrist. Findings are consistent with the clinical  history of rheumatoid arthritis. Chronic fracture deformities of the  right distal radius and ulna. No acute fracture is seen.  SPENCER MONSALVE MD\"    Immunization History     Immunization History   Administered Date(s) Administered     Influenza (High Dose) 3 valent vaccine 10/28/2013, 09/23/2014, 11/11/2015, 09/23/2016     Influenza (IIV3) PF 10/14/2007, 10/21/2009     Mantoux Tuberculin Skin Test 11/03/2006     Pneumo Conj 13-V (2010&after) 07/29/2015     Pneumococcal 23 valent 06/26/2000, 06/02/2010     TD (ADULT, 7+) 07/20/2009     Tdap (Adult) Unspecified Formulation 12/12/2018     Zoster vaccine recombinant adjuvanted (SHINGRIX) 12/12/2018, 02/14/2019          Chart documentation done in part with Dragon Voice recognition Software. Although reviewed after completion, some word and grammatical error may remain.    Nico Byers MD  "

## 2019-03-06 NOTE — TELEPHONE ENCOUNTER
Prior Authorization Retail Medication Request    Medication/Dose: methotrexate and syringes  ICD code (if different than what is on RX):    Previously Tried and Failed:  unknown  Rationale:  unknown    Insurance Name:  [facility] part d  Insurance ID:  [id number]      Pharmacy Information (if different than what is on RX)  Name:  [facility]   Phone:  [phone]    Thank you,  Kerry Lott, PharmD  [facility]  [phone]

## 2019-03-07 ENCOUNTER — OFFICE VISIT (OUTPATIENT)
Dept: PODIATRY | Facility: CLINIC | Age: 79
End: 2019-03-07
Payer: COMMERCIAL

## 2019-03-07 VITALS
SYSTOLIC BLOOD PRESSURE: 118 MMHG | DIASTOLIC BLOOD PRESSURE: 74 MMHG | HEART RATE: 86 BPM | WEIGHT: 200 LBS | BODY MASS INDEX: 30.86 KG/M2

## 2019-03-07 DIAGNOSIS — M06.30 RHEUMATOID NODULES (H): ICD-10-CM

## 2019-03-07 DIAGNOSIS — M21.622 TAILOR'S BUNION OF LEFT FOOT: ICD-10-CM

## 2019-03-07 DIAGNOSIS — M05.79 RHEUMATOID ARTHRITIS INVOLVING MULTIPLE SITES WITH POSITIVE RHEUMATOID FACTOR (H): Primary | ICD-10-CM

## 2019-03-07 DIAGNOSIS — M79.675 PAIN IN TOE OF LEFT FOOT: ICD-10-CM

## 2019-03-07 DIAGNOSIS — S90.221A SUBUNGUAL HEMATOMA OF TOE OF RIGHT FOOT, INITIAL ENCOUNTER: ICD-10-CM

## 2019-03-07 LAB — DEPRECATED CALCIDIOL+CALCIFEROL SERPL-MC: 41 UG/L (ref 20–75)

## 2019-03-07 PROCEDURE — 99214 OFFICE O/P EST MOD 30 MIN: CPT | Performed by: PODIATRIST

## 2019-03-07 NOTE — PROGRESS NOTES
"Subjective:    Pt is seen today with the c/c of painful leftfoot.  This has been symptomatic for the past month.   Patient points to later fifth metatarsal head.    Describes this as a burning pain.  Aggravated by activity and releaved by rest.  Has noticed a mass here and is wondering if she has a rheumatoid nodule.  No pain here when she wears wide enough shoes.  She also notices 2 new masses over her right Achilles tendon and she points to the watershed area.  She has had these for several months.  She states she has had masses here before and when she went on methotrexate they disappeared.  She has no pain with these.  Also recently noted that her right hallux nail is all discolored.  She has no pain.  She cannot remember any history of trauma at all.  Patient also has pain in her left second toe.  She has a history of surgery here.  It is hitting her hallux and causing discomfort at times.  Patient has rheumatoid arthritis.  She has had numerous stress fracture in her lesser metatarsals.  She has had surgery on her digits.  Otherwise she has no other new complaints about her feet.    ROS:  A 10-point review of systems was performed and is positive for that noted in the HPI and as seen above.  All other areas are negative.          Allergies   Allergen Reactions     Abatacept      Severe headaches     Adhesive Tape      Plastic tape     Celebrex [Celecoxib]      Ineffective     Ethanol      Antihistamines     Orencia [Abatacept]      Headache     Septra [Bactrim]      Sulfa Drugs      \"deathly ill\"     Tramadol      Headache       Current Outpatient Medications   Medication Sig Dispense Refill     acetaminophen (TYLENOL) 500 MG tablet Take 2 tablets (1,000 mg) by mouth every 8 hours as needed for pain 100 tablet 0     Ascorbic Acid 1000 MG TABS Take 1 tablet by mouth 3 times daily       aspirin 81 MG tablet Take 1 tablet by mouth daily.       atorvastatin (LIPITOR) 40 MG tablet TAKE ONE TABLET BY MOUTH EVERY DAY " "(Patient not taking: Reported on 3/6/2019) 90 tablet 2     atorvastatin (LIPITOR) 40 MG tablet Take 1 tablet (40 mg) by mouth daily 90 tablet 2     Calcium Citrate-Vitamin D (CALCIUM CITRATE + PO) Take 2 tablets by mouth daily       Cholecalciferol (VITAMIN D-3 PO) Take 1,000 Units by mouth 2 times daily       cyanocobalamin 500 MCG TABS Take 1 tablet by mouth 2 times daily       cyclobenzaprine (FLEXERIL) 10 MG tablet Take 1 tablet (10 mg) by mouth 2 times daily as needed for muscle spasms (Patient not taking: Reported on 12/12/2018) 60 tablet 2     doxycycline hyclate (VIBRAMYCIN) 100 MG capsule Take 1 capsule (100 mg) by mouth 2 times daily (Patient not taking: Reported on 3/6/2019) 14 capsule 0     ferrous gluconate (FERGON) 324 (38 Fe) MG tablet Take 1 tablet (324 mg) by mouth daily (with breakfast) 270 tablet 1     FOLIC ACID PO Take 800 mcg by mouth daily       hydroxychloroquine (PLAQUENIL) 200 MG tablet Take 1 tablet (200 mg) by mouth 2 times daily 180 tablet 2     ibuprofen (ADVIL) 200 MG tablet Take 200 mg by mouth every 4 hours as needed for mild pain       Insulin Syringe-Needle U-100 (BD INSULIN SYRINGE) 27G X 1/2\" 1 ML MISC For weekly methotrexate administration 10 each 6     ipratropium (ATROVENT) 0.06 % nasal spray Spray 2 sprays into both nostrils 4 times daily as needed for rhinitis 1 Box 3     ipratropium (ATROVENT) 0.06 % spray Spray 2 sprays into both nostrils 4 times daily as needed (Patient not taking: Reported on 8/8/2018) 1 Box 3     magnesium oxide (MAG-OX) 400 (241.3 MG) MG tablet Take 1 tablet (400 mg) by mouth daily       methotrexate sodium, pres-free, 50 MG/2ML SOLN injection CHEMO Inject 1 mL (25 mg) Subcutaneous every 7 days 4 vial 3     multivitamin, therapeutic with minerals (MULTI-VITAMIN) TABS Take 1 tablet by mouth daily       naproxen sodium (ANAPROX) 220 MG tablet Take 220 mg by mouth 2 times daily (with meals)       Omega-3 Fatty Acids (OMEGA-3 FISH OIL PO) Take 2,000 mg by " mouth daily        order for DME Respironics REMSTAR 60 Series Auto CPAP 12-15 cm H2O, Wisp nasal mask w/a s/m cushion (Patient not taking: Reported on 12/12/2018)       pantoprazole (PROTONIX) 40 MG EC tablet TAKE ONE TABLET BY MOUTH EVERY DAY 30-60 MINUTES BEFORE A MEAL 90 tablet 2     predniSONE (DELTASONE) 5 MG tablet Prednisone 20mg daily x5days, then 15mg daily x5days, then 10mg daily x30days, then 5mg daily x30days, then stop.. 125 tablet 0     Pseudoephedrine-Guaifenesin (MUCINEX D PO)        tofacitinib (XELJANZ XR) 11 MG 24 hr tablet Take 1 tablet (11 mg) by mouth daily 90 tablet 3     vitamin  B complex with vitamin C (VITAMIN  B COMPLEX) TABS Take 1 tablet by mouth daily.  0     VITAMIN E PO Take 2,000 Units by mouth daily          Patient Active Problem List   Diagnosis     RA (Rheumatoid Arthritis) off Plaquenil     Menopause     History of colonic polyps     Mitral Regurgitation     Multinodular goiter     CARDIOVASCULAR SCREENING; LDL GOAL LESS THAN 130     Psychophysiologic insomnia     Pulmonary nodule     PSEUDOPHAKIA OU     PVD (POSTERIOR VITREOUS DETACHMENT) OU     PXF (PSEUDOEXFOLIATION OF LENS CAPSULE) OD     GERD (gastroesophageal reflux disease)     Hyperlipidemia LDL goal <100     Advance Care Planning     Neuropathy     SHERRY (obstructive sleep apnea)-severe (AHI 35)     zEncounter for counseling     Anemia of chronic disease     Migraine     Osteoporosis     Cornea guttata, ou     Conjunctival concretions     Stenosis, spinal, lumbar     Other chronic pain     Obesity     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     MGD (meibomian gland dysfunction)     Blepharitis of both eyes     Personal history of healed osteoporosis fracture     Iron malabsorption     Hyperglycemia     Chronic bilateral low back pain without sciatica     Allergic state, subsequent encounter     BMI 32.0-32.9,adult     Spinal stenosis of lumbar region without neurogenic claudication     Herniated nucleus  pulposus, L3-4     S/P lumbar fusion     Anxiety     Low iron     BMI 28.0-28.9,adult     History of depression       Past Medical History:   Diagnosis Date     Acute posthemorrhagic anemia 10/13/2012     Ex-smoker      History of blood transfusion      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     History of total knee replacement 2009     Menopause late 40's     Other chronic pain     joints     Pelvic fracture (H) 2014     PUD (peptic ulcer disease)      Rheumatoid arteritis      Sleep apnea     Uses a CPAP     Vitamin B12 deficiency        Past Surgical History:   Procedure Laterality Date     ABDOMEN SURGERY      c-sections     ARTHROSCOPY KNEE RT/LT      left     BACK SURGERY      disc     BREAST BIOPSY, RT/LT      left benign     BREAST SURGERY  's    lumpectomy     C ANESTH,TOTAL HIP ARTHROPLASTY      Rt hip     C HAND/FINGER SURGERY UNLISTED      right hand     C NONSPECIFIC PROCEDURE  91    left foot surgery     C NONSPECIFIC PROCEDURE  95    R MCP surgery     C TOTAL KNEE ARTHROPLASTY      left     C/SECTION, LOW TRANSVERSE  66,72    x 2     CATARACT IOL, RT/LT       COLONOSCOPY  ,     EYE SURGERY      cataracs     HC ESOPH/GAS REFLUX TEST W NASAL IMPED >1 HR  2012    Procedure:ESOPHAGEAL IMPEDENCE FUNCTION TEST WITH 24 HOUR PH GREATER THAN 1 HOUR; Surgeon:KAYKAY JULIO; Location:[facility]     IR CAUDAL EPIDURAL INJECTION SINGLE  2012    LESI L5-S1 at [facility]     OPTICAL TRACKING SYSTEM FUSION POSTERIOR SPINE LUMBAR N/A 4/3/2017    Procedure: OPTICAL TRACKING SYSTEM FUSION SPINE POSTERIOR LUMBAR ONE LEVEL;  Surgeon: Anthony Michele MD;  Location: [facility]     SURGICAL HISTORY OF -       right knee total replacement       Family History   Problem Relation Age of Onset     Arthritis Mother      Alzheimer Disease Mother      Hyperlipidemia Mother      Osteoporosis Mother      Heart Disease Father         MI ( from this)     Alcohol/Drug  Father      Arthritis Sister      Hypertension Sister      Cancer Son      Diabetes Son      Neurologic Disorder Sister         Schizophrenic     Hypertension Sister      Hyperlipidemia Sister      Mental Illness Sister      Diabetes Son      Other Cancer Son        Social History     Tobacco Use     Smoking status: Former Smoker     Packs/day: 1.00     Years: 41.00     Pack years: 41.00     Types: Cigarettes     Last attempt to quit: 1999     Years since quittin.1     Smokeless tobacco: Never Used     Tobacco comment: former smoker   Substance Use Topics     Alcohol use: Yes     Alcohol/week: 0.0 oz     Comment: Periodically.         Exam:    Vitals: /74   Pulse 86   Wt 90.7 kg (200 lb)   BMI 30.86 kg/m    BMI: Body mass index is 30.86 kg/m .  Height: Data Unavailable    Constitutional/ general:  Pt is in no apparent distress, appears well-nourished.  Cooperative with history and physical exam.     Psych:  The patient answered questions appropriately.  Normal affect.  Seems to have reasonable expectations, in terms of treatment.     Eyes:  Visual scanning/ tracking without deficit.     Ears:  Response to auditory stimuli is normal.  .  Auricles in proper alignment.     Lymphatic:  Popliteal lymph nodes not enlarged.     Lungs:  Non labored breathing, non labored speech. No cough.  No audible wheezing. Even, quiet breathing.       Vascular:  positive pedal pulses bilaterally for both the DP and PT arteries.  CFT < 3 sec.  negative ankle edema.  negative pedal hair growth.    Neuro:  Alert and oriented x 3. Coordinated gait.  Light touch sensation is intact to the L4, L5, S1 distributions. No obvious deficits.  No evidence of neurological-based weakness, spasticity, or contracture in the lower extremities.      Derm: Normal texture and turgor.  No erythema, ecchymosis, or cyanosis.      Musculoskeletal:     Patient is ambulatory without an assistive device or brace.  Cavus arch with weightbearing.    MS 5/5 all compartments.  Normal ROM all rearfoot joints.  Right Achilles tendon and watershed area 2 small masses are noted.  No pain with palpation.  No deficit the tendon.  Patient has somewhat of a wide forefoot.  She has a left bunionette deformity noted.  We can palpate a bursa over the plantar medial portion of the fifth MTPJ.  There is no pain here.  Left second toe has somewhat of a medial curve to it.  The IPJ is a very stiff with no range of motion.  Left first MTPJ is fused with a proximal phalanx slightly elevated.  Her right first MTPJ is fused.  Slightly elevated.  Right hallux nail dark.  Dried blood noted underneath.    Radiographic Exam:   FOOT BILATERAL THREE OR MORE VIEWS  2/7/2018 11:50 AM      HISTORY:  Rheumatoid arthritis involving multiple sites with positive  rheumatoid factor (H).                                                                      IMPRESSION:  1. Left second through fifth metatarsal head deformity which could be  related to chronic erosions. The left first metatarsophalangeal joint  is fused. There is probable soft tissue swelling lateral to the fifth  metatarsal head.  2. Right first metatarsophalangeal joint space narrowing which could  be degenerative or related to old erosive changes. Second and third  toe proximal phalangeal joint effusions are noted. Attenuation of the  fourth and fifth proximal phalangeal heads could be postsurgical or  erosive in origin. Deformity of the third metatarsal head could be  erosive in origin as well. Mild deformity of the fifth metatarsal  proximal diaphysis could be related to an old healed injury/fracture.  3. Bilateral osteopenia.      A/P   Rheumatoid arthritis   Rheumatoid nodules right Achilles   Right hallux subungual hematoma   Left second toe pain   Left bunionette with plantar lateral bursitis     X-rays both feet from past personally reviewed.  Discussed with patient mass over the left fifth MTPJ probably bursa as these are  very common here.  Discussed it could be a rheumatoid nodule but with palpation feels more like a bursa..  Will wear wide shoes with no seams over this area.  Discussed stretching out shoes in this area.  Discussed better arch support and orthotics.  Gave patient a toe  to offload left second toe.  Discussed right hallux subungual hematoma from pressure.  She will keep nails short and make sure her shoes are not too tight.  Explained the mass in her Achilles are probably rheumatoid nodules.  Small and nonpainful and she will just watch these.  RTC as needed.   RETURN TO CLINIC PRN.    Josse Asif DPM, FACFAS

## 2019-03-07 NOTE — PATIENT INSTRUCTIONS
We wish you continued good healing. If you have any questions or concerns, please do not hesitate to contact us at [phone]    Please remember to call and schedule a follow up appointment if one was recommended at your earliest convenience.   PODIATRY CLINIC HOURS  TELEPHONE NUMBER    Dr. Josse Asif D.P.M [facility]    Clinics:  [facility]    [address]olvia Sylvester [facility] [address]   Tuesday 1PM-6PM  [place]  Wednesday 7AM-2PM  [facility]  Thursday 10AM-6PM  [place]  Friday 7AM-3PM  [place]  Specialty schedulers:   [phone] to make an appointment with any Specialty Provider.        Urgent Care locations:    [facility] Monday-Friday 5 pm - 9 pm. Saturday-Sunday 9 am -5pm    Monday-Friday 11 am - 9 pm Saturday 9 am - 5 pm     Monday-Sunday 12 noon-8PM [phone]     If you need a medication refill, please contact us you may need lab work and/or a follow up visit prior to your refill (i.e. Antifungal medications).    [employer] (secure e-mail communication and access to your chart) to send a message or to make an appointment.    If MRI needed please call [place] [facility] at [phone]        Weight management plan: Patient was referred to their PCP to discuss a diet and exercise plan.

## 2019-03-07 NOTE — LETTER
"    3/7/2019         RE: Ginger Marshall  [address]  [facility] [address]        Dear Colleague,    Thank you for referring your patient, Ginger Marshall, to the [facility]. Please see a copy of my visit note below.    Subjective:    Pt is seen today with the c/c of painful leftfoot.  This has been symptomatic for the past month.   Patient points to later fifth metatarsal head.    Describes this as a burning pain.  Aggravated by activity and releaved by rest.  Has noticed a mass here and is wondering if she has a rheumatoid nodule.  No pain here when she wears wide enough shoes.  She also notices 2 new masses over her right Achilles tendon and she points to the watershed area.  She has had these for several months.  She states she has had masses here before and when she went on methotrexate they disappeared.  She has no pain with these.  Also recently noted that her right hallux nail is all discolored.  She has no pain.  She cannot remember any history of trauma at all.  Patient also has pain in her left second toe.  She has a history of surgery here.  It is hitting her hallux and causing discomfort at times.  Patient has rheumatoid arthritis.  She has had numerous stress fracture in her lesser metatarsals.  She has had surgery on her digits.  Otherwise she has no other new complaints about her feet.    ROS:  A 10-point review of systems was performed and is positive for that noted in the HPI and as seen above.  All other areas are negative.          Allergies   Allergen Reactions     Abatacept      Severe headaches     Adhesive Tape      Plastic tape     Celebrex [Celecoxib]      Ineffective     Ethanol      Antihistamines     Orencia [Abatacept]      Headache     Septra [Bactrim]      Sulfa Drugs      \"deathly ill\"     Tramadol      Headache       Current Outpatient Medications   Medication Sig Dispense Refill     acetaminophen (TYLENOL) 500 MG tablet Take 2 tablets (1,000 mg) " "by mouth every 8 hours as needed for pain 100 tablet 0     Ascorbic Acid 1000 MG TABS Take 1 tablet by mouth 3 times daily       aspirin 81 MG tablet Take 1 tablet by mouth daily.       atorvastatin (LIPITOR) 40 MG tablet TAKE ONE TABLET BY MOUTH EVERY DAY (Patient not taking: Reported on 3/6/2019) 90 tablet 2     atorvastatin (LIPITOR) 40 MG tablet Take 1 tablet (40 mg) by mouth daily 90 tablet 2     Calcium Citrate-Vitamin D (CALCIUM CITRATE + PO) Take 2 tablets by mouth daily       Cholecalciferol (VITAMIN D-3 PO) Take 1,000 Units by mouth 2 times daily       cyanocobalamin 500 MCG TABS Take 1 tablet by mouth 2 times daily       cyclobenzaprine (FLEXERIL) 10 MG tablet Take 1 tablet (10 mg) by mouth 2 times daily as needed for muscle spasms (Patient not taking: Reported on 12/12/2018) 60 tablet 2     doxycycline hyclate (VIBRAMYCIN) 100 MG capsule Take 1 capsule (100 mg) by mouth 2 times daily (Patient not taking: Reported on 3/6/2019) 14 capsule 0     ferrous gluconate (FERGON) 324 (38 Fe) MG tablet Take 1 tablet (324 mg) by mouth daily (with breakfast) 270 tablet 1     FOLIC ACID PO Take 800 mcg by mouth daily       hydroxychloroquine (PLAQUENIL) 200 MG tablet Take 1 tablet (200 mg) by mouth 2 times daily 180 tablet 2     ibuprofen (ADVIL) 200 MG tablet Take 200 mg by mouth every 4 hours as needed for mild pain       Insulin Syringe-Needle U-100 (BD INSULIN SYRINGE) 27G X 1/2\" 1 ML MISC For weekly methotrexate administration 10 each 6     ipratropium (ATROVENT) 0.06 % nasal spray Spray 2 sprays into both nostrils 4 times daily as needed for rhinitis 1 Box 3     ipratropium (ATROVENT) 0.06 % spray Spray 2 sprays into both nostrils 4 times daily as needed (Patient not taking: Reported on 8/8/2018) 1 Box 3     magnesium oxide (MAG-OX) 400 (241.3 MG) MG tablet Take 1 tablet (400 mg) by mouth daily       methotrexate sodium, pres-free, 50 MG/2ML SOLN injection CHEMO Inject 1 mL (25 mg) Subcutaneous every 7 days 4 " vial 3     multivitamin, therapeutic with minerals (MULTI-VITAMIN) TABS Take 1 tablet by mouth daily       naproxen sodium (ANAPROX) 220 MG tablet Take 220 mg by mouth 2 times daily (with meals)       Omega-3 Fatty Acids (OMEGA-3 FISH OIL PO) Take 2,000 mg by mouth daily        order for DME Respironics REMSTAR 60 Series Auto CPAP 12-15 cm H2O, Wisp nasal mask w/a s/m cushion (Patient not taking: Reported on 12/12/2018)       pantoprazole (PROTONIX) 40 MG EC tablet TAKE ONE TABLET BY MOUTH EVERY DAY 30-60 MINUTES BEFORE A MEAL 90 tablet 2     predniSONE (DELTASONE) 5 MG tablet Prednisone 20mg daily x5days, then 15mg daily x5days, then 10mg daily x30days, then 5mg daily x30days, then stop.. 125 tablet 0     Pseudoephedrine-Guaifenesin (MUCINEX D PO)        tofacitinib (XELJANZ XR) 11 MG 24 hr tablet Take 1 tablet (11 mg) by mouth daily 90 tablet 3     vitamin  B complex with vitamin C (VITAMIN  B COMPLEX) TABS Take 1 tablet by mouth daily.  0     VITAMIN E PO Take 2,000 Units by mouth daily          Patient Active Problem List   Diagnosis     RA (Rheumatoid Arthritis) off Plaquenil     Menopause     History of colonic polyps     Mitral Regurgitation     Multinodular goiter     CARDIOVASCULAR SCREENING; LDL GOAL LESS THAN 130     Psychophysiologic insomnia     Pulmonary nodule     PSEUDOPHAKIA OU     PVD (POSTERIOR VITREOUS DETACHMENT) OU     PXF (PSEUDOEXFOLIATION OF LENS CAPSULE) OD     GERD (gastroesophageal reflux disease)     Hyperlipidemia LDL goal <100     Advance Care Planning     Neuropathy     SHERRY (obstructive sleep apnea)-severe (AHI 35)     zEncounter for counseling     Anemia of chronic disease     Migraine     Osteoporosis     Cornea guttata, ou     Conjunctival concretions     Stenosis, spinal, lumbar     Other chronic pain     Obesity     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     MGD (meibomian gland dysfunction)     Blepharitis of both eyes     Personal history of healed osteoporosis  fracture     Iron malabsorption     Hyperglycemia     Chronic bilateral low back pain without sciatica     Allergic state, subsequent encounter     BMI 32.0-32.9,adult     Spinal stenosis of lumbar region without neurogenic claudication     Herniated nucleus pulposus, L3-4     S/P lumbar fusion     Anxiety     Low iron     BMI 28.0-28.9,adult     History of depression       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     History of total knee replacement 7/23/2009     Menopause late 40's     Other chronic pain     joints     Pelvic fracture (H) 5/13/2014     PUD (peptic ulcer disease)      Rheumatoid arteritis      Sleep apnea     Uses a CPAP     Vitamin B12 deficiency        Past Surgical History:   Procedure Laterality Date     ABDOMEN SURGERY      c-sections     ARTHROSCOPY KNEE RT/LT  01/06    left     BACK SURGERY  2013    disc     BREAST BIOPSY, RT/LT      left benign     BREAST SURGERY  90's    lumpectomy     C ANESTH,TOTAL HIP ARTHROPLASTY  2010    Rt hip     C HAND/FINGER SURGERY UNLISTED  11/05    right hand     C NONSPECIFIC PROCEDURE  91    left foot surgery     C NONSPECIFIC PROCEDURE  95    R MCP surgery     C TOTAL KNEE ARTHROPLASTY  2006    left     C/SECTION, LOW TRANSVERSE  66,72    x 2     CATARACT IOL, RT/LT       COLONOSCOPY  2006,2009     EYE SURGERY      cataracs     HC ESOPH/GAS REFLUX TEST W NASAL IMPED >1 HR  2/1/2012    Procedure:ESOPHAGEAL IMPEDENCE FUNCTION TEST WITH 24 HOUR PH GREATER THAN 1 HOUR; Surgeon:KAYKAY JULIO; Location:[facility]     IR CAUDAL EPIDURAL INJECTION SINGLE  5/2/2012    LESI L5-S1 at [facility]     OPTICAL TRACKING SYSTEM FUSION POSTERIOR SPINE LUMBAR N/A 4/3/2017    Procedure: OPTICAL TRACKING SYSTEM FUSION SPINE POSTERIOR LUMBAR ONE LEVEL;  Surgeon: Anthony Michele MD;  Location:  OR     SURGICAL HISTORY OF -   2007     right knee total replacement       Family History   Problem Relation Age of Onset     Arthritis Mother      Alzheimer Disease Mother      Hyperlipidemia Mother      Osteoporosis Mother      Heart Disease Father         MI ( from this)     Alcohol/Drug Father      Arthritis Sister      Hypertension Sister      Cancer Son      Diabetes Son      Neurologic Disorder Sister         Schizophrenic     Hypertension Sister      Hyperlipidemia Sister      Mental Illness Sister      Diabetes Son      Other Cancer Son        Social History     Tobacco Use     Smoking status: Former Smoker     Packs/day: 1.00     Years: 41.00     Pack years: 41.00     Types: Cigarettes     Last attempt to quit: 1999     Years since quittin.1     Smokeless tobacco: Never Used     Tobacco comment: former smoker   Substance Use Topics     Alcohol use: Yes     Alcohol/week: 0.0 oz     Comment: Periodically.         Exam:    Vitals: /74   Pulse 86   Wt 90.7 kg (200 lb)   BMI 30.86 kg/m     BMI: Body mass index is 30.86 kg/m .  Height: Data Unavailable    Constitutional/ general:  Pt is in no apparent distress, appears well-nourished.  Cooperative with history and physical exam.     Psych:  The patient answered questions appropriately.  Normal affect.  Seems to have reasonable expectations, in terms of treatment.     Eyes:  Visual scanning/ tracking without deficit.     Ears:  Response to auditory stimuli is normal.  .  Auricles in proper alignment.     Lymphatic:  Popliteal lymph nodes not enlarged.     Lungs:  Non labored breathing, non labored speech. No cough.  No audible wheezing. Even, quiet breathing.       Vascular:  positive pedal pulses bilaterally for both the DP and PT arteries.  CFT < 3 sec.  negative ankle edema.  negative pedal hair growth.    Neuro:  Alert and oriented x 3. Coordinated gait.  Light touch sensation is intact to the L4, L5, S1 distributions. No obvious deficits.  No evidence of neurological-based  weakness, spasticity, or contracture in the lower extremities.      Derm: Normal texture and turgor.  No erythema, ecchymosis, or cyanosis.      Musculoskeletal:     Patient is ambulatory without an assistive device or brace.  Cavus arch with weightbearing.   MS 5/5 all compartments.  Normal ROM all rearfoot joints.  Right Achilles tendon and watershed area 2 small masses are noted.  No pain with palpation.  No deficit the tendon.  Patient has somewhat of a wide forefoot.  She has a left bunionette deformity noted.  We can palpate a bursa over the plantar medial portion of the fifth MTPJ.  There is no pain here.  Left second toe has somewhat of a medial curve to it.  The IPJ is a very stiff with no range of motion.  Left first MTPJ is fused with a proximal phalanx slightly elevated.  Her right first MTPJ is fused.  Slightly elevated.  Right hallux nail dark.  Dried blood noted underneath.    Radiographic Exam:   FOOT BILATERAL THREE OR MORE VIEWS  2/7/2018 11:50 AM      HISTORY:  Rheumatoid arthritis involving multiple sites with positive  rheumatoid factor (H).                                                                      IMPRESSION:  1. Left second through fifth metatarsal head deformity which could be  related to chronic erosions. The left first metatarsophalangeal joint  is fused. There is probable soft tissue swelling lateral to the fifth  metatarsal head.  2. Right first metatarsophalangeal joint space narrowing which could  be degenerative or related to old erosive changes. Second and third  toe proximal phalangeal joint effusions are noted. Attenuation of the  fourth and fifth proximal phalangeal heads could be postsurgical or  erosive in origin. Deformity of the third metatarsal head could be  erosive in origin as well. Mild deformity of the fifth metatarsal  proximal diaphysis could be related to an old healed injury/fracture.  3. Bilateral osteopenia.      A/P   Rheumatoid arthritis   Rheumatoid  nodules right Achilles   Right hallux subungual hematoma   Left second toe pain   Left bunionette with plantar lateral bursitis     X-rays both feet from past personally reviewed.  Discussed with patient mass over the left fifth MTPJ probably bursa as these are very common here.  Discussed it could be a rheumatoid nodule but with palpation feels more like a bursa..  Will wear wide shoes with no seams over this area.  Discussed stretching out shoes in this area.  Discussed better arch support and orthotics.  Gave patient a toe  to offload left second toe.  Discussed right hallux subungual hematoma from pressure.  She will keep nails short and make sure her shoes are not too tight.  Explained the mass in her Achilles are probably rheumatoid nodules.  Small and nonpainful and she will just watch these.  RTC as needed.   RETURN TO CLINIC PRN.    Josse Asif DPM, FACFAS      Again, thank you for allowing me to participate in the care of your patient.        Sincerely,        Josse Asif DPM

## 2019-03-07 NOTE — TELEPHONE ENCOUNTER
PA team,  For the methotrexate PA - MsKiesha Marshall has seropositive rheumatoid arthritis that is currently treated with methotrexate, hydroxychloroquine, and Xeljanz.  Failed Remicade in the past.  Methotrexate PO is effective but because she has persistent disease activity we are changing from oral to subcutaneous route of administration to increase bioavailability to better control her disease.    Nico Byers MD  3/7/2019 6:05 AM

## 2019-03-11 NOTE — TELEPHONE ENCOUNTER
PA Initiation    Medication: methotrexate   Insurance Company: [facility] - Phone [phone] Fax [phone]  Pharmacy Filling the Rx: [place] [facility] - [address] [facility]  Filling Pharmacy Phone: [phone]  Filling Pharmacy Fax:    Start Date: 3/11/2019    Central Prior Authorization Team   Phone: [phone]

## 2019-03-13 NOTE — TELEPHONE ENCOUNTER
Prior Authorization Approval    Authorization Effective Date: 2/9/2019  Authorization Expiration Date: 3/10/2022  Medication: methotrexate   Approved Dose/Quantity: 4  Reference #: [id number]   Insurance Company: [facility] - Phone [phone] Fax [phone]  Which Pharmacy is filling the prescription (Not needed for infusion/clinic administered): [facility] [place], [place] - [address] [facility]  Pharmacy Notified: Yes pt has already picked this up on 3/11/19  Patient Notified: Yes

## 2019-03-26 NOTE — ANESTHESIA PREPROCEDURE EVALUATION
Anesthesia Evaluation     . Pt has had prior anesthetic. Type: General and MAC           ROS/MED HX    ENT/Pulmonary:     (+)sleep apnea, uses CPAP , . .    Neurologic:  - neg neurologic ROS     Cardiovascular:         METS/Exercise Tolerance:     Hematologic:  - neg hematologic  ROS       Musculoskeletal:  - neg musculoskeletal ROS       GI/Hepatic:     (+) GERD Asymptomatic on medication,       Renal/Genitourinary:         Endo:     (+) thyroid problem hypothyroidism, Obesity, .      Psychiatric:  - neg psychiatric ROS       Infectious Disease:  - neg infectious disease ROS       Malignancy:      - no malignancy   Other:    (+) No chance of pregnancy C-spine cleared: N/A, H/O Chronic Pain,no other significant disability                    Physical Exam  Normal systems: cardiovascular, pulmonary and dental    Airway   Mallampati: II  TM distance: >3 FB  Neck ROM: full    Dental     Cardiovascular       Pulmonary                     Anesthesia Plan      History & Physical Review  History and physical reviewed and following examination; no interval change.    ASA Status:  3 .    NPO Status:  > 8 hours    Plan for General with Intravenous induction. Maintenance will be Balanced.    PONV prophylaxis:  Ondansetron (or other 5HT-3) and Dexamethasone or Solumedrol       Postoperative Care  Postoperative pain management:  IV analgesics.      Consents  Anesthetic plan, risks, benefits and alternatives discussed with:  Patient.  Use of blood products discussed: Yes.   Use of blood products discussed with Patient.  Consented to blood products.  .                          .   marijuana

## 2019-03-28 ENCOUNTER — MYC MEDICAL ADVICE (OUTPATIENT)
Dept: RHEUMATOLOGY | Facility: CLINIC | Age: 79
End: 2019-03-28

## 2019-03-28 DIAGNOSIS — M05.79 RHEUMATOID ARTHRITIS INVOLVING MULTIPLE SITES WITH POSITIVE RHEUMATOID FACTOR (H): Primary | Chronic | ICD-10-CM

## 2019-03-29 ENCOUNTER — TELEPHONE (OUTPATIENT)
Dept: RHEUMATOLOGY | Facility: CLINIC | Age: 79
End: 2019-03-29

## 2019-03-29 NOTE — TELEPHONE ENCOUNTER
Prior Authorization Retail Medication Request    Medication/Dose: Rasuvo 25 mg   ICD code (if different than what is on RX):    Previously Tried and Failed:  Not sure  Rationale:  Product not on formulary    Insurance Name:  [facility] Part [facility]  Insurance ID:  [id number]      Pharmacy Information (if different than what is on RX)  Name:  [facility]   Phone:  [phone]          Thank You,  Ladarius Leos, [facility]  On behalf of [facility]

## 2019-03-29 NOTE — TELEPHONE ENCOUNTER
PA team: please PA rasuvo.  Rasuvo is an autoinjector for methotrexate.  The patient cannot inject herself with an exposed needle but does okay with autoinjectors.    Nico Byers MD  3/29/2019 1:02 PM

## 2019-03-29 NOTE — TELEPHONE ENCOUNTER
Does the PA note mean that it is being submitted for insurance authorization?    More info: Previously failed remicade (staph infection during therapy, but was immediately after a joint injection) and orencia (migraines).  Sulfa allergy    Thanks,  Nico Byers MD  3/29/2019 3:32 PM

## 2019-04-03 NOTE — TELEPHONE ENCOUNTER
PA Initiation    Medication: Rasuvo  Insurance Company: [facility] - Phone [phone] Fax [phone]  Pharmacy Filling the Rx: [place] [facility] - [address] [facility]  Filling Pharmacy Phone: [phone]  Filling Pharmacy Fax:    Start Date: 4/3/2019    Central Prior Authorization Team   Phone: [phone]      Manually faxed prior authorization form to [facility] at fax# [phone]

## 2019-04-05 NOTE — TELEPHONE ENCOUNTER
Prior Authorization Approval    Authorization Effective Date: 3/4/2019  Authorization Expiration Date: 4/3/2020  Medication: Rasuvo  Approved Dose/Quantity: 2  Reference #: [id number]   Insurance Company: [facility] - Phone [phone] Fax [phone]  Which Pharmacy is filling the prescription (Not needed for infusion/clinic administered): [facility] [place], [place] - [address] [facility]  Pharmacy Notified: Yes **Pharmacy will notify patient when script is ready for .**  Patient Notified: Yes

## 2019-04-09 ENCOUNTER — OFFICE VISIT (OUTPATIENT)
Dept: OPHTHALMOLOGY | Facility: CLINIC | Age: 79
End: 2019-04-09
Payer: COMMERCIAL

## 2019-04-09 ENCOUNTER — TELEPHONE (OUTPATIENT)
Dept: OPHTHALMOLOGY | Facility: CLINIC | Age: 79
End: 2019-04-09

## 2019-04-09 DIAGNOSIS — H02.889 MGD (MEIBOMIAN GLAND DYSFUNCTION): ICD-10-CM

## 2019-04-09 DIAGNOSIS — M05.79 RHEUMATOID ARTHRITIS INVOLVING MULTIPLE SITES WITH POSITIVE RHEUMATOID FACTOR (H): ICD-10-CM

## 2019-04-09 DIAGNOSIS — H11.122 CONJUNCTIVAL CONCRETIONS OF LEFT EYE: Primary | ICD-10-CM

## 2019-04-09 LAB
ALBUMIN SERPL-MCNC: 4 G/DL (ref 3.4–5)
ALP SERPL-CCNC: 53 U/L (ref 40–150)
ALT SERPL W P-5'-P-CCNC: 30 U/L (ref 0–50)
AST SERPL W P-5'-P-CCNC: 25 U/L (ref 0–45)
BASOPHILS # BLD AUTO: 0 10E9/L (ref 0–0.2)
BASOPHILS NFR BLD AUTO: 0.2 %
BILIRUB DIRECT SERPL-MCNC: 0.1 MG/DL (ref 0–0.2)
BILIRUB SERPL-MCNC: 0.4 MG/DL (ref 0.2–1.3)
CREAT SERPL-MCNC: 0.61 MG/DL (ref 0.52–1.04)
DIFFERENTIAL METHOD BLD: NORMAL
EOSINOPHIL # BLD AUTO: 0.2 10E9/L (ref 0–0.7)
EOSINOPHIL NFR BLD AUTO: 2.6 %
ERYTHROCYTE [DISTWIDTH] IN BLOOD BY AUTOMATED COUNT: 14.4 % (ref 10–15)
GFR SERPL CREATININE-BSD FRML MDRD: 87 ML/MIN/{1.73_M2}
HCT VFR BLD AUTO: 40 % (ref 35–47)
HGB BLD-MCNC: 12.6 G/DL (ref 11.7–15.7)
LYMPHOCYTES # BLD AUTO: 0.9 10E9/L (ref 0.8–5.3)
LYMPHOCYTES NFR BLD AUTO: 9.5 %
MCH RBC QN AUTO: 31 PG (ref 26.5–33)
MCHC RBC AUTO-ENTMCNC: 31.5 G/DL (ref 31.5–36.5)
MCV RBC AUTO: 98 FL (ref 78–100)
MONOCYTES # BLD AUTO: 0.8 10E9/L (ref 0–1.3)
MONOCYTES NFR BLD AUTO: 9 %
NEUTROPHILS # BLD AUTO: 7.1 10E9/L (ref 1.6–8.3)
NEUTROPHILS NFR BLD AUTO: 78.7 %
PLATELET # BLD AUTO: 424 10E9/L (ref 150–450)
PROT SERPL-MCNC: 8 G/DL (ref 6.8–8.8)
RBC # BLD AUTO: 4.07 10E12/L (ref 3.8–5.2)
WBC # BLD AUTO: 9.1 10E9/L (ref 4–11)

## 2019-04-09 PROCEDURE — 82565 ASSAY OF CREATININE: CPT | Performed by: INTERNAL MEDICINE

## 2019-04-09 PROCEDURE — 80076 HEPATIC FUNCTION PANEL: CPT | Performed by: INTERNAL MEDICINE

## 2019-04-09 PROCEDURE — 36415 COLL VENOUS BLD VENIPUNCTURE: CPT | Performed by: INTERNAL MEDICINE

## 2019-04-09 PROCEDURE — 92012 INTRM OPH EXAM EST PATIENT: CPT | Performed by: STUDENT IN AN ORGANIZED HEALTH CARE EDUCATION/TRAINING PROGRAM

## 2019-04-09 PROCEDURE — 85025 COMPLETE CBC W/AUTO DIFF WBC: CPT | Performed by: INTERNAL MEDICINE

## 2019-04-09 ASSESSMENT — VISUAL ACUITY
OS_SC: 20/20
CORRECTION_TYPE: GLASSES
METHOD: SNELLEN - LINEAR
OS_SC+: -1
OD_SC: 20/25

## 2019-04-09 ASSESSMENT — SLIT LAMP EXAM - LIDS
COMMENTS: 1+ MEIBOMIAN GLAND DYSFUNCTION
COMMENTS: 1+ MEIBOMIAN GLAND DYSFUNCTION

## 2019-04-09 ASSESSMENT — TONOMETRY
OD_IOP_MMHG: 14
OS_IOP_MMHG: 17
IOP_METHOD: ICARE

## 2019-04-09 ASSESSMENT — EXTERNAL EXAM - RIGHT EYE: OD_EXAM: NORMAL

## 2019-04-09 ASSESSMENT — EXTERNAL EXAM - LEFT EYE: OS_EXAM: NORMAL

## 2019-04-09 NOTE — PATIENT INSTRUCTIONS
Recommend continuing preservative-free artificial tears as often as you like in both eyes     Could use a gel tear at bedtime or up to four times a day (like Refresh Liquigel or GenTeal Gel Tears)     Continue warm compresses on the eyes daily    Aguila San MD  [phone]

## 2019-04-09 NOTE — TELEPHONE ENCOUNTER
Patient is in office today, really frustrated with her eyes.  Upset at the  today.  Wants an appointment to have Dr. San look under her eyelids, still really bothering her.  Drops and warm compresses not helping.  Told her she can be seen at 10:45 am today if she wants to wait.    Kerry offered her the appointment and she walked away from the desk, she is upset about her eyes. Came back a few min later and wants to be seen.

## 2019-04-09 NOTE — LETTER
4/9/2019         RE: Ginger Marshall  [address]  [place] [facility] [place] [facility] [address]        Dear Colleague,    Thank you for referring your patient, Ginger Marshall, to the [facility]. Please see a copy of my visit note below.     Current Eye Medications:  Preservative free Thera tears 5-7 times daily both eyes. Switched to preservative free 9 days ago.     Subjective:  Scratchy pain feeling both eyes for last 3 months, has had on and off for last 20 years. Every time patient blinks feels like she is scratching eyes. Vision is OK both eyes. Patient has to adjust glasses often to see.      Objective:  See Ophthalmology Exam.       Assessment:  Ginger Marshall is a 78 year old female who presents with:   Encounter Diagnoses   Name Primary?     Conjunctival concretions of left eye Few larger concretions of the left upper palpebral conjunctiva debrided with 27g needle under proparacaine anesthesia at slit lamp.     MGD (meibomian gland dysfunction)        Plan:  Recommend continuing preservative-free artificial tears as often as you like in both eyes     Could use a gel tear at bedtime or up to four times a day (like Refresh Liquigel or GenTeal Gel Tears)     Continue warm compresses on the eyes daily    Aguila San MD  [phone]          Again, thank you for allowing me to participate in the care of your patient.        Sincerely,        Aguila San MD

## 2019-04-09 NOTE — PROGRESS NOTES
Current Eye Medications:  Preservative free Thera tears 5-7 times daily both eyes. Switched to preservative free 9 days ago.     Subjective:  Scratchy pain feeling both eyes for last 3 months, has had on and off for last 20 years. Every time patient blinks feels like she is scratching eyes. Vision is OK both eyes. Patient has to adjust glasses often to see.      Objective:  See Ophthalmology Exam.       Assessment:  Ginger Marshall is a 78 year old female who presents with:   Encounter Diagnoses   Name Primary?     Conjunctival concretions of left eye Few larger concretions of the left upper palpebral conjunctiva debrided with 27g needle under proparacaine anesthesia at slit lamp.     MGD (meibomian gland dysfunction)        Plan:  Recommend continuing preservative-free artificial tears as often as you like in both eyes     Could use a gel tear at bedtime or up to four times a day (like Refresh Liquigel or GenTeal Gel Tears)     Continue warm compresses on the eyes daily    Aguila San MD  [phone]

## 2019-05-08 DIAGNOSIS — M05.79 RHEUMATOID ARTHRITIS INVOLVING MULTIPLE SITES WITH POSITIVE RHEUMATOID FACTOR (H): ICD-10-CM

## 2019-05-08 LAB
ALBUMIN SERPL-MCNC: 3.9 G/DL (ref 3.4–5)
ALP SERPL-CCNC: 49 U/L (ref 40–150)
ALT SERPL W P-5'-P-CCNC: 36 U/L (ref 0–50)
AST SERPL W P-5'-P-CCNC: 23 U/L (ref 0–45)
BASOPHILS # BLD AUTO: 0 10E9/L (ref 0–0.2)
BASOPHILS NFR BLD AUTO: 0.3 %
BILIRUB DIRECT SERPL-MCNC: <0.1 MG/DL (ref 0–0.2)
BILIRUB SERPL-MCNC: 0.3 MG/DL (ref 0.2–1.3)
CREAT SERPL-MCNC: 0.61 MG/DL (ref 0.52–1.04)
CRP SERPL-MCNC: <2.9 MG/L (ref 0–8)
DIFFERENTIAL METHOD BLD: NORMAL
EOSINOPHIL # BLD AUTO: 0.4 10E9/L (ref 0–0.7)
EOSINOPHIL NFR BLD AUTO: 5.7 %
ERYTHROCYTE [DISTWIDTH] IN BLOOD BY AUTOMATED COUNT: 14.4 % (ref 10–15)
ERYTHROCYTE [SEDIMENTATION RATE] IN BLOOD BY WESTERGREN METHOD: 21 MM/H (ref 0–30)
GFR SERPL CREATININE-BSD FRML MDRD: 87 ML/MIN/{1.73_M2}
HCT VFR BLD AUTO: 37.8 % (ref 35–47)
HGB BLD-MCNC: 12.1 G/DL (ref 11.7–15.7)
LYMPHOCYTES # BLD AUTO: 1.4 10E9/L (ref 0.8–5.3)
LYMPHOCYTES NFR BLD AUTO: 20.7 %
MCH RBC QN AUTO: 30.9 PG (ref 26.5–33)
MCHC RBC AUTO-ENTMCNC: 32 G/DL (ref 31.5–36.5)
MCV RBC AUTO: 97 FL (ref 78–100)
MONOCYTES # BLD AUTO: 1.1 10E9/L (ref 0–1.3)
MONOCYTES NFR BLD AUTO: 15.7 %
NEUTROPHILS # BLD AUTO: 3.9 10E9/L (ref 1.6–8.3)
NEUTROPHILS NFR BLD AUTO: 57.6 %
PLATELET # BLD AUTO: 338 10E9/L (ref 150–450)
PROT SERPL-MCNC: 7.6 G/DL (ref 6.8–8.8)
RBC # BLD AUTO: 3.91 10E12/L (ref 3.8–5.2)
WBC # BLD AUTO: 6.7 10E9/L (ref 4–11)

## 2019-05-08 PROCEDURE — 36415 COLL VENOUS BLD VENIPUNCTURE: CPT | Performed by: INTERNAL MEDICINE

## 2019-05-08 PROCEDURE — 85025 COMPLETE CBC W/AUTO DIFF WBC: CPT | Performed by: INTERNAL MEDICINE

## 2019-05-08 PROCEDURE — 85652 RBC SED RATE AUTOMATED: CPT | Performed by: INTERNAL MEDICINE

## 2019-05-08 PROCEDURE — 86140 C-REACTIVE PROTEIN: CPT | Performed by: INTERNAL MEDICINE

## 2019-05-08 PROCEDURE — 80076 HEPATIC FUNCTION PANEL: CPT | Performed by: INTERNAL MEDICINE

## 2019-05-08 PROCEDURE — 82565 ASSAY OF CREATININE: CPT | Performed by: INTERNAL MEDICINE

## 2019-05-09 DIAGNOSIS — K21.9 GASTROESOPHAGEAL REFLUX DISEASE WITHOUT ESOPHAGITIS: ICD-10-CM

## 2019-05-09 RX ORDER — PANTOPRAZOLE SODIUM 40 MG/1
TABLET, DELAYED RELEASE ORAL
Qty: 90 TABLET | Refills: 2 | Status: SHIPPED | OUTPATIENT
Start: 2019-05-09 | End: 2020-01-15

## 2019-05-09 NOTE — TELEPHONE ENCOUNTER
"Routing refill request to provider for review/approval because:  Failed [facility] refill protocol, see below:    Requested Prescriptions   Pending Prescriptions Disp Refills     pantoprazole (PROTONIX) 40 MG EC tablet [Pharmacy Med Name: PANTOPRAZOLE SODIUM 40MG TBEC]  Last Written Prescription Date:  3/9/18  Last Fill Quantity: 90,  # refills: 2   Last office visit: 6/22/2018 with prescribing provider:     Future Office Visit:   Next 5 appointments (look out 90 days)    Jul 11, 2019 10:20 AM CDT  [facility] with [facility]  [facility] [address]  [phone]          90 tablet 2     Sig: TAKE ONE TABLET BY MOUTH EVERY DAY 30-60 MINUTES BEFORE A MEAL       PPI Protocol Failed - 5/9/2019 10:05 AM        Failed - No diagnosis of osteoporosis on record        Passed - Not on Clopidogrel (unless Pantoprazole ordered)        Passed - Recent (12 mo) or future (30 days) visit within the authorizing provider's specialty     Patient had office visit in the last 12 months or has a visit in the next 30 days with authorizing provider or within the authorizing provider's specialty.  See \"Patient Info\" tab in inbasket, or \"Choose Columns\" in Meds & Orders section of the refill encounter.              Passed - Medication is active on med list        Passed - Patient is age 18 or older        Passed - No active pregnacy on record        Passed - No positive pregnancy test in past 12 months        Phyllis Burris RN - BC      "

## 2019-05-20 ENCOUNTER — TELEPHONE (OUTPATIENT)
Dept: FAMILY MEDICINE | Facility: CLINIC | Age: 79
End: 2019-05-20

## 2019-05-20 NOTE — TELEPHONE ENCOUNTER
Prior Authorization Retail Medication Request    Medication/Dose: xeljanz  ICD code (if different than what is on RX):    Previously Tried and Failed:  unknown  Rationale:  unknown    Insurance Name:  [facility]  Insurance ID:  [id number]      Pharmacy Information (if different than what is on RX)  Name:  [place] [facility]  Phone:  [phone]    Thank you,  Kerry Lott, PharmD  [facility]  [phone]

## 2019-05-22 NOTE — TELEPHONE ENCOUNTER
PA Initiation    Medication: xeljanz  Insurance Company: [facility] - Phone [phone] Fax [phone]  Pharmacy Filling the Rx: [place] [facility] - [address] [facility] [address] [facility]  Filling Pharmacy Phone: [phone]  Filling Pharmacy Fax:    Start Date: 5/22/2019    Central Prior Authorization Team   Phone: [phone]

## 2019-05-23 NOTE — TELEPHONE ENCOUNTER
Prior Authorization Approval    Authorization Effective Date: 4/22/2019  Authorization Expiration Date: 5/21/2022  Medication: xeljanz  Approved Dose/Quantity: 90  Reference #: [id number]   Insurance Company: [employer] - Phone [phone] Fax [phone]  Which Pharmacy is filling the prescription (Not needed for infusion/clinic administered): [facility] - [address] [facility] [place], [place]  Pharmacy Notified: Yes  Patient Notified: No pharmacy will contact patient once ready

## 2019-06-05 ENCOUNTER — TELEPHONE (OUTPATIENT)
Dept: FAMILY MEDICINE | Facility: CLINIC | Age: 79
End: 2019-06-05

## 2019-06-05 ENCOUNTER — ANCILLARY PROCEDURE (OUTPATIENT)
Dept: GENERAL RADIOLOGY | Facility: CLINIC | Age: 79
End: 2019-06-05
Attending: INTERNAL MEDICINE
Payer: COMMERCIAL

## 2019-06-05 ENCOUNTER — OFFICE VISIT (OUTPATIENT)
Dept: INTERNAL MEDICINE | Facility: CLINIC | Age: 79
End: 2019-06-05
Payer: COMMERCIAL

## 2019-06-05 VITALS
TEMPERATURE: 98.4 F | OXYGEN SATURATION: 94 % | DIASTOLIC BLOOD PRESSURE: 70 MMHG | RESPIRATION RATE: 14 BRPM | HEIGHT: 68 IN | WEIGHT: 209.4 LBS | SYSTOLIC BLOOD PRESSURE: 122 MMHG | HEART RATE: 92 BPM | BODY MASS INDEX: 31.74 KG/M2

## 2019-06-05 DIAGNOSIS — M81.0 OSTEOPOROSIS, UNSPECIFIED OSTEOPOROSIS TYPE, UNSPECIFIED PATHOLOGICAL FRACTURE PRESENCE: ICD-10-CM

## 2019-06-05 DIAGNOSIS — M54.42 ACUTE LEFT-SIDED LOW BACK PAIN WITH LEFT-SIDED SCIATICA: Primary | ICD-10-CM

## 2019-06-05 DIAGNOSIS — M05.79 RHEUMATOID ARTHRITIS INVOLVING MULTIPLE SITES WITH POSITIVE RHEUMATOID FACTOR (H): Chronic | ICD-10-CM

## 2019-06-05 DIAGNOSIS — E66.09 NON MORBID OBESITY DUE TO EXCESS CALORIES: ICD-10-CM

## 2019-06-05 PROCEDURE — 72100 X-RAY EXAM L-S SPINE 2/3 VWS: CPT

## 2019-06-05 PROCEDURE — 73502 X-RAY EXAM HIP UNI 2-3 VIEWS: CPT

## 2019-06-05 PROCEDURE — 99214 OFFICE O/P EST MOD 30 MIN: CPT | Performed by: INTERNAL MEDICINE

## 2019-06-05 RX ORDER — TOPIRAMATE 25 MG/1
TABLET, FILM COATED ORAL
Qty: 70 TABLET | Refills: 1 | Status: SHIPPED | OUTPATIENT
Start: 2019-06-05 | End: 2019-07-16

## 2019-06-05 ASSESSMENT — ANXIETY QUESTIONNAIRES
7. FEELING AFRAID AS IF SOMETHING AWFUL MIGHT HAPPEN: NOT AT ALL
1. FEELING NERVOUS, ANXIOUS, OR ON EDGE: NOT AT ALL
GAD7 TOTAL SCORE: 0
IF YOU CHECKED OFF ANY PROBLEMS ON THIS QUESTIONNAIRE, HOW DIFFICULT HAVE THESE PROBLEMS MADE IT FOR YOU TO DO YOUR WORK, TAKE CARE OF THINGS AT HOME, OR GET ALONG WITH OTHER PEOPLE: NOT DIFFICULT AT ALL
5. BEING SO RESTLESS THAT IT IS HARD TO SIT STILL: NOT AT ALL
6. BECOMING EASILY ANNOYED OR IRRITABLE: NOT AT ALL
3. WORRYING TOO MUCH ABOUT DIFFERENT THINGS: NOT AT ALL
2. NOT BEING ABLE TO STOP OR CONTROL WORRYING: NOT AT ALL

## 2019-06-05 ASSESSMENT — PATIENT HEALTH QUESTIONNAIRE - PHQ9
SUM OF ALL RESPONSES TO PHQ QUESTIONS 1-9: 10
5. POOR APPETITE OR OVEREATING: NOT AT ALL

## 2019-06-05 ASSESSMENT — PAIN SCALES - GENERAL: PAINLEVEL: MODERATE PAIN (4)

## 2019-06-05 ASSESSMENT — MIFFLIN-ST. JEOR: SCORE: 1475.39

## 2019-06-05 NOTE — PATIENT INSTRUCTIONS
I will let you know the next steps after the xrays.  If the xray is negative for fracture then you will have a MRI or CT.    Restart Topamax per package directions.  You can go up slower, just not faster.

## 2019-06-05 NOTE — LETTER
[address] [place] [address]    June 6, 2019    Ginger MONTEMAYOR Joanna  [address]   [facility] [address]          Dear Ginger,  No fracture.  Enclosed is a copy of your results.     HIP TWO VIEWS LEFT 6/5/2019 4:22 PM     HISTORY: Acute left-sided low back pain with left-sided sciatica.  Osteoporosis, unspecified osteoporosis type, unspecified pathological  fracture presence.    COMPARISON: Fiordaliza 15, 2015    FINDINGS: Mild loss of joint space and acetabular spurring. Healed  fracture deformities of the inferior and superior pubic ramus noted.   There is no acute fracture. No dislocation.  There are no worrisome  bony lesions.    IMPRESSION: No acute osseous abnormality demonstrated.    FAHAD GREENFIELD MD    If you have any questions or concerns, please call myself or my nurse at [phone].      Sincerely,        Georgia Vázquez MD/pb

## 2019-06-05 NOTE — PROGRESS NOTES
"Subjective     Ginger Marshall is a 79 year old female who presents to clinic today for the following health issues:    HPI   Back Pain Patient states it started about 2-3 weeks ago. Patient states she was doing housework and it started hurting. The pain is in the low back and goes into the left buttocks and lateral upper leg. Patient states at first it was a sharp stabbing pain but she has been using her friends tens unit which has helped but it causes her pain when she bends down. Pain radiation:radiates into the left buttocks and radiates into the left leg. Patient rates the pain currently at about 3-4/10    Alleviating factors:   Improved by: Tens Unit, sitting on ice (momentarily)     Precipitating factors:  Worsened by: Lifting, Bending and Walking    \"It does hurt when I walk or just move in changing positions.  It is sharp stabbing pains.  I have taken Aleve along with Tylenol and ice all at the same time and as long as I am on the ice its ok but then when I get up there is not much difference from before doing those things.  Yes it is activity that really sets it off making it worse. I always have tingling in my feet so I don't consider that a part of what is now going on.  I do know that my surgery did not solve all of the problem with my back.  Maybe I need an MRI to show the problem. \"        Accompanying Signs & Symptoms:  Risk of Fracture:  Osteoporosis and Age >64  Risk of Cauda Equina:  None  Risk of Infection:  None  Risk of Cancer:  None  Risk of Ankylosing Spondylitis:  Onset at age <35, male, AND morning back stiffness. no              She is on prednisone.  This seems to be making her weight worse.  She doesn't have good control of her rheumatoid arthritis but is working on this.        Patient Active Problem List   Diagnosis     RA (Rheumatoid Arthritis) off Plaquenil     Menopause     History of colonic polyps     Mitral Regurgitation     Multinodular goiter     CARDIOVASCULAR SCREENING; LDL " GOAL LESS THAN 130     Psychophysiologic insomnia     Pulmonary nodule     PSEUDOPHAKIA OU     PVD (POSTERIOR VITREOUS DETACHMENT) OU     PXF (PSEUDOEXFOLIATION OF LENS CAPSULE) OD     GERD (gastroesophageal reflux disease)     Hyperlipidemia LDL goal <100     Advance Care Planning     Neuropathy     SHERRY (obstructive sleep apnea)-severe (AHI 35)     zEncounter for counseling     Anemia of chronic disease     Migraine     Osteoporosis     Cornea guttata, ou     Conjunctival concretions     Stenosis, spinal, lumbar     Other chronic pain     Obesity     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     MGD (meibomian gland dysfunction)     Blepharitis of both eyes     Personal history of healed osteoporosis fracture     Iron malabsorption     Hyperglycemia     Chronic bilateral low back pain without sciatica     Allergic state, subsequent encounter     BMI 32.0-32.9,adult     Spinal stenosis of lumbar region without neurogenic claudication     Herniated nucleus pulposus, L3-4     S/P lumbar fusion     Anxiety     Low iron     BMI 28.0-28.9,adult     History of depression     Past Surgical History:   Procedure Laterality Date     ABDOMEN SURGERY      c-sections     ARTHROSCOPY KNEE RT/LT  01/06    left     BACK SURGERY  2013    disc     BREAST BIOPSY, RT/LT      left benign     BREAST SURGERY  90's    lumpectomy     C ANESTH,TOTAL HIP ARTHROPLASTY  2010    Rt hip     C HAND/FINGER SURGERY UNLISTED  11/05    right hand     C NONSPECIFIC PROCEDURE  91    left foot surgery     C NONSPECIFIC PROCEDURE  95    R MCP surgery     C TOTAL KNEE ARTHROPLASTY  2006    left     C/SECTION, LOW TRANSVERSE  66,72    x 2     CATARACT IOL, RT/LT       COLONOSCOPY  2006,2009     EYE SURGERY      cataracs     HC ESOPH/GAS REFLUX TEST W NASAL IMPED >1 HR  2/1/2012    Procedure:ESOPHAGEAL IMPEDENCE FUNCTION TEST WITH 24 HOUR PH GREATER THAN 1 HOUR; Surgeon:KAYKAY JULIO; Location:[facility]     IR CAUDAL EPIDURAL INJECTION SINGLE   2012    LESI L5-S1 at [facility]     OPTICAL TRACKING SYSTEM FUSION POSTERIOR SPINE LUMBAR N/A 4/3/2017    Procedure: OPTICAL TRACKING SYSTEM FUSION SPINE POSTERIOR LUMBAR ONE LEVEL;  Surgeon: Anthony Michele MD;  Location: [facility]     SURGICAL HISTORY OF -       right knee total replacement       Social History     Tobacco Use     Smoking status: Former Smoker     Packs/day: 1.00     Years: 41.00     Pack years: 41.00     Types: Cigarettes     Last attempt to quit: 1999     Years since quittin.4     Smokeless tobacco: Never Used     Tobacco comment: former smoker   Substance Use Topics     Alcohol use: Yes     Alcohol/week: 0.0 oz     Comment: Periodically.     Family History   Problem Relation Age of Onset     Arthritis Mother      Alzheimer Disease Mother      Hyperlipidemia Mother      Osteoporosis Mother      Heart Disease Father         MI ( from this)     Alcohol/Drug Father      Arthritis Sister      Hypertension Sister      Cancer Son      Diabetes Son      Neurologic Disorder Sister         Schizophrenic     Hypertension Sister      Hyperlipidemia Sister      Mental Illness Sister      Diabetes Son      Other Cancer Son          Current Outpatient Medications   Medication Sig Dispense Refill     acetaminophen (TYLENOL) 500 MG tablet Take 2 tablets (1,000 mg) by mouth every 8 hours as needed for pain 100 tablet 0     atorvastatin (LIPITOR) 40 MG tablet Take 1 tablet (40 mg) by mouth daily 90 tablet 2     Cholecalciferol (VITAMIN D-3 PO) Take 1,000 Units by mouth 2 times daily       FOLIC ACID PO Take 800 mcg by mouth daily       hydroxychloroquine (PLAQUENIL) 200 MG tablet Take 1 tablet (200 mg) by mouth 2 times daily 180 tablet 2     ibuprofen (ADVIL) 200 MG tablet Take 200 mg by mouth every 4 hours as needed for mild pain       ipratropium (ATROVENT) 0.06 % nasal spray Spray 2 sprays into both nostrils 4 times daily as needed for rhinitis 1 Box 3     Methotrexate, PF, (RASUVO) 25  "MG/0.5ML autoinjector Inject 0.5 mLs (25 mg) Subcutaneous every 7 days . Hold for signs of infection, and seek medical attention. 2 mL 3     multivitamin, therapeutic with minerals (MULTI-VITAMIN) TABS Take 1 tablet by mouth daily       naproxen sodium (ANAPROX) 220 MG tablet Take 220 mg by mouth 2 times daily (with meals)       pantoprazole (PROTONIX) 40 MG EC tablet TAKE ONE TABLET BY MOUTH EVERY DAY 30-60 MINUTES BEFORE A MEAL 90 tablet 2     pantoprazole (PROTONIX) 40 MG EC tablet TAKE ONE TABLET BY MOUTH EVERY DAY 30-60 MINUTES BEFORE A MEAL 90 tablet 2     Pseudoephedrine-Guaifenesin (MUCINEX D PO)        tofacitinib (XELJANZ XR) 11 MG 24 hr tablet Take 1 tablet (11 mg) by mouth daily 90 tablet 3     topiramate (TOPAMAX) 25 MG tablet Take 1 tablet (25 mg) at bedtime for 1 week, then 1 tablet twice daily for 1 week, then 1 tablet in AM and 2 in PM for 1 week, then 2 tablets twice daily. 70 tablet 1     Allergies   Allergen Reactions     Abatacept Other (See Comments)     Severe headaches  Migraine     Celebrex [Celecoxib]      Ineffective     Celecoxib Unknown and Nausea     Ethanol      Antihistamines     Orencia [Abatacept]      Headache     Septra [Bactrim]      Sulfa Drugs Nausea and Vomiting     \"deathly ill\"  Allergic to everything with Sulfa in it.     Sulfamethoxazole-Trimethoprim Nausea     Tramadol Other (See Comments)     Headache  Migraine headache     Valdecoxib Unknown     Made me \"very very ill\", might of been \"cramping\"     Adhesive Tape Rash     Plastic tape  Plastic tapes     Antihistamines, Chlorpheniramine-Type  [Alkylamines] Anxiety and Other (See Comments)   The 10-year ASCVD risk score (Kaleb SHETTY Jr., et al., 2013) is: 18.7%    Values used to calculate the score:      Age: 79 years      Sex: Female      Is Non- : No      Diabetic: No      Tobacco smoker: No      Systolic Blood Pressure: 122 mmHg      Is BP treated: No      HDL Cholesterol: 79 mg/dL      Total " "Cholesterol: 164 mg/dL      Reviewed and updated as needed this visit by Provider  Tobacco  Allergies  Meds  Problems  Med Hx  Surg Hx  Fam Hx         Review of Systems    ROS: 10 point ROS neg other than the symptoms noted above in the HPI.       Objective    /70 (BP Location: Left arm, Cuff Size: Adult Regular)   Pulse 92   Temp 98.4  F (36.9  C) (Oral)   Resp 14   Ht 1.715 m (5' 7.5\")   Wt 95 kg (209 lb 6.4 oz)   SpO2 94%   BMI 32.31 kg/m    Body mass index is 32.31 kg/m .  Physical Exam   GENERAL APPEARANCE: healthy, alert and no distress  RESP: lungs clear to auscultation - no rales, rhonchi or wheezes  CV: regular rates and rhythm and normal S1 S2, no S3 or S4  PSYCH: mentation appears normal. and affect normal/bright  Pain to palpation of the L4 region.    Normal strength in the bilateral lower extremity.  Bent over gait.    No edema     Diagnostic Test Results:  Labs reviewed in Epic  Results for orders placed or performed in visit on 06/05/19   XR Hip Left 2-3 Views    Narrative    HIP TWO VIEWS LEFT 6/5/2019 4:22 PM     HISTORY: Acute left-sided low back pain with left-sided sciatica.  Osteoporosis, unspecified osteoporosis type, unspecified pathological  fracture presence.    COMPARISON: Fiordaliza 15, 2015    FINDINGS: Mild loss of joint space and acetabular spurring. Healed  fracture deformities of the inferior and superior pubic ramus noted.   There is no acute fracture. No dislocation.  There are no worrisome  bony lesions.      Impression    IMPRESSION: No acute osseous abnormality demonstrated.    FAHAD GREENFIELD MD   XR Lumbar Spine 2/3 Views    Narrative    XR LUMBAR SPINE 2-3 VIEWS 6/5/2019 4:22 PM    HISTORY: Acute left sided back pain.    COMPARISON: 2/20/2018    FINDINGS: Hardware at L3-L4 appears stable. Loss of disc space in the  low lumbar spine is stable. Vertebral body heights and upper lumbar  disc spaces are preserved. No acute osseous abnormality. Patchy  vascular " "calcification.      Impression    IMPRESSION: Stable hardware and alignment. No acute abnormality.    CAMILLE SNOW MD           Assessment & Plan     1. Rheumatoid arthritis involving multiple sites with positive rheumatoid factor (H)    - topiramate (TOPAMAX) 25 MG tablet; Take 1 tablet (25 mg) at bedtime for 1 week, then 1 tablet twice daily for 1 week, then 1 tablet in AM and 2 in PM for 1 week, then 2 tablets twice daily.  Dispense: 70 tablet; Refill: 1    2. Acute left-sided low back pain with left-sided sciatica  Will need MRI given negative xray  - XR Hip Left 2-3 Views  - XR Lumbar Spine 2/3 Views  - topiramate (TOPAMAX) 25 MG tablet; Take 1 tablet (25 mg) at bedtime for 1 week, then 1 tablet twice daily for 1 week, then 1 tablet in AM and 2 in PM for 1 week, then 2 tablets twice daily.  Dispense: 70 tablet; Refill: 1  - MR Lumbar Spine w/o & w Contrast; Future    3. Osteoporosis, unspecified osteoporosis type, unspecified pathological fracture presence  At risk for fracture   - XR Hip Left 2-3 Views  - XR Lumbar Spine 2/3 Views  - MR Lumbar Spine w/o & w Contrast; Future    4. Obesity  Poor control of weight off topamax.  Has been on in recent past and tolerated well.    - topiramate (TOPAMAX) 25 MG tablet; Take 1 tablet (25 mg) at bedtime for 1 week, then 1 tablet twice daily for 1 week, then 1 tablet in AM and 2 in PM for 1 week, then 2 tablets twice daily.  Dispense: 70 tablet; Refill: 1     BMI:   Estimated body mass index is 32.31 kg/m  as calculated from the following:    Height as of this encounter: 1.715 m (5' 7.5\").    Weight as of this encounter: 95 kg (209 lb 6.4 oz).   Weight management plan: Discussed healthy diet and exercise guidelines        Patient Instructions   I will let you know the next steps after the xrays.  If the xray is negative for fracture then you will have a MRI or CT.    Restart Topamax per package directions.  You can go up slower, just not faster.       Return in about 1 " month (around 7/3/2019) for topamax and back pain..    Georgia Vázquez MD  [facility]

## 2019-06-05 NOTE — TELEPHONE ENCOUNTER
Prior Authorization Retail Medication Request    Medication/Dose: topamax 25  ICD code (if different than what is on RX):  M05.79  Previously Tried and Failed:  N    Insurance Name: [facility] part d  Insurance ID:  [id number]      Pharmacy Information (if different than what is on RX)  [facility]  Phone:  [phone]

## 2019-06-06 ASSESSMENT — ANXIETY QUESTIONNAIRES: GAD7 TOTAL SCORE: 0

## 2019-06-07 NOTE — TELEPHONE ENCOUNTER
PA Initiation    Medication: topamax 25- INITIATED  Insurance Company: [employer] - Phone [phone] Fax [phone]  Pharmacy Filling the Rx: [facility]  [address] [facility]  Filling Pharmacy Phone: [phone]  Filling Pharmacy Fax:    Start Date: 6/7/2019    Ginger Marshall Evans: ASHANTI PIRES Case ID: [id number]     none

## 2019-06-07 NOTE — TELEPHONE ENCOUNTER
PRIOR AUTHORIZATION DENIED    Medication: topamax 25- DENIED    Denial Date: 6/7/2019    Denial Rational: Medication is only covered for migraines or seizures.         Appeal Information: If provider would like to appeal we will need a detailed letter of medical necessity to start the process. Then re-route this request back to the PA pool.

## 2019-06-11 NOTE — TELEPHONE ENCOUNTER
Left patient VM to return call. Please inform her of message below.  Venus Mckeon CMA on 6/11/2019 at 10:14 AM

## 2019-06-12 DIAGNOSIS — M05.79 RHEUMATOID ARTHRITIS INVOLVING MULTIPLE SITES WITH POSITIVE RHEUMATOID FACTOR (H): ICD-10-CM

## 2019-06-12 LAB
ALBUMIN SERPL-MCNC: 3.9 G/DL (ref 3.4–5)
ALP SERPL-CCNC: 46 U/L (ref 40–150)
ALT SERPL W P-5'-P-CCNC: 32 U/L (ref 0–50)
AST SERPL W P-5'-P-CCNC: 26 U/L (ref 0–45)
BASOPHILS # BLD AUTO: 0 10E9/L (ref 0–0.2)
BASOPHILS NFR BLD AUTO: 0.2 %
BILIRUB DIRECT SERPL-MCNC: <0.1 MG/DL (ref 0–0.2)
BILIRUB SERPL-MCNC: 0.4 MG/DL (ref 0.2–1.3)
CREAT SERPL-MCNC: 0.64 MG/DL (ref 0.52–1.04)
DIFFERENTIAL METHOD BLD: NORMAL
EOSINOPHIL # BLD AUTO: 0.3 10E9/L (ref 0–0.7)
EOSINOPHIL NFR BLD AUTO: 5.7 %
ERYTHROCYTE [DISTWIDTH] IN BLOOD BY AUTOMATED COUNT: 14.9 % (ref 10–15)
GFR SERPL CREATININE-BSD FRML MDRD: 86 ML/MIN/{1.73_M2}
HCT VFR BLD AUTO: 37.2 % (ref 35–47)
HGB BLD-MCNC: 12 G/DL (ref 11.7–15.7)
LYMPHOCYTES # BLD AUTO: 1.1 10E9/L (ref 0.8–5.3)
LYMPHOCYTES NFR BLD AUTO: 20.1 %
MCH RBC QN AUTO: 31.1 PG (ref 26.5–33)
MCHC RBC AUTO-ENTMCNC: 32.3 G/DL (ref 31.5–36.5)
MCV RBC AUTO: 96 FL (ref 78–100)
MONOCYTES # BLD AUTO: 0.7 10E9/L (ref 0–1.3)
MONOCYTES NFR BLD AUTO: 13.5 %
NEUTROPHILS # BLD AUTO: 3.3 10E9/L (ref 1.6–8.3)
NEUTROPHILS NFR BLD AUTO: 60.5 %
PLATELET # BLD AUTO: 355 10E9/L (ref 150–450)
PROT SERPL-MCNC: 7.7 G/DL (ref 6.8–8.8)
RBC # BLD AUTO: 3.86 10E12/L (ref 3.8–5.2)
WBC # BLD AUTO: 5.5 10E9/L (ref 4–11)

## 2019-06-12 PROCEDURE — 80076 HEPATIC FUNCTION PANEL: CPT | Performed by: INTERNAL MEDICINE

## 2019-06-12 PROCEDURE — 36415 COLL VENOUS BLD VENIPUNCTURE: CPT | Performed by: INTERNAL MEDICINE

## 2019-06-12 PROCEDURE — 82565 ASSAY OF CREATININE: CPT | Performed by: INTERNAL MEDICINE

## 2019-06-12 PROCEDURE — 85025 COMPLETE CBC W/AUTO DIFF WBC: CPT | Performed by: INTERNAL MEDICINE

## 2019-06-13 ENCOUNTER — ANCILLARY PROCEDURE (OUTPATIENT)
Dept: MRI IMAGING | Facility: CLINIC | Age: 79
End: 2019-06-13
Attending: INTERNAL MEDICINE
Payer: COMMERCIAL

## 2019-06-13 DIAGNOSIS — M54.42 ACUTE LEFT-SIDED LOW BACK PAIN WITH LEFT-SIDED SCIATICA: ICD-10-CM

## 2019-06-13 DIAGNOSIS — M81.0 OSTEOPOROSIS, UNSPECIFIED OSTEOPOROSIS TYPE, UNSPECIFIED PATHOLOGICAL FRACTURE PRESENCE: ICD-10-CM

## 2019-06-13 PROCEDURE — A9585 GADOBUTROL INJECTION: HCPCS

## 2019-06-13 PROCEDURE — 72158 MRI LUMBAR SPINE W/O & W/DYE: CPT | Mod: TC

## 2019-06-13 RX ORDER — GADOBUTROL 604.72 MG/ML
10 INJECTION INTRAVENOUS ONCE
Status: COMPLETED | OUTPATIENT
Start: 2019-06-13 | End: 2019-06-13

## 2019-06-13 RX ADMIN — GADOBUTROL 10 ML: 604.72 INJECTION INTRAVENOUS at 10:36

## 2019-06-13 NOTE — RESULT ENCOUNTER NOTE
"03/06/2019       Delfina Pereira,   [address]  [facility] [address]    Mya Chung  2/10/1926    Chief Complaint   Patient presents with   • Follow-up     6 Month Follow UP for Peripherl Artery Disease. Test 03/06/2019 US pad ankle / brach ind ext comp. Patient denies any stroke like symptoms.        Dear Delfina Pereira,        HPI  I had the pleasure of seeing your patient Mya Chung in the office today.   As you recall, Mya Chung is a 89 y.o.  female who you are currently following for routine health maintenance.  She has complaints of weakness to bilateral lower extremities.   She denies any cramping to her legs at night when she sleeps.  She was describing claudication and underwent  a left lower extremity angiogram most recently a left common femoral endarterectomy in August 2018.  She denies any claudication symptoms since that time.  She is maintained on aspirin and Plavix.  She did have noninvasive testing performed today, which I did review in office.      Review of Systems   Constitutional: Negative.    Eyes: Negative.    Respiratory: Negative.    Cardiovascular: Negative.    Gastrointestinal: Negative.    Endocrine: Negative.    Genitourinary: Negative.    Musculoskeletal: Negative.    Skin: Positive for color change.   Allergic/Immunologic: Negative.    Neurological: Negative for numbness.   Hematological: Negative.    Psychiatric/Behavioral: Negative.        /70 (BP Location: Right arm, Patient Position: Sitting, Cuff Size: Adult)   Pulse 88   Ht 162.6 cm (64\")   Wt 54.9 kg (121 lb)   SpO2 97%   BMI 20.77 kg/m²   Physical Exam   Constitutional: She is oriented to person, place, and time. She appears well-developed and well-nourished. No distress.   HENT:   Head: Normocephalic and atraumatic.   Mouth/Throat: Oropharynx is clear and moist.   Eyes: Pupils are equal, round, and reactive to light. No scleral icterus.   Neck: Normal range of motion. " There are no changes from the last MRI in 3/2018.  Options now include physical therapy and/or injections, although it isn't clear exactly where the injection is needed given the stable MRI.  Let me know what you think and how you are feeling now?  Thanks ,  Dr. Vázquez   Neck supple. No JVD present. Carotid bruit is not present. No thyromegaly present.   Cardiovascular: Normal rate, regular rhythm, S2 normal, normal heart sounds, intact distal pulses and normal pulses. Exam reveals no gallop and no friction rub.   No murmur heard.  Pulses:       Femoral pulses are 2+ on the right side, and 2+ on the left side.       Dorsalis pedis pulses are 2+ on the right side, and 2+ on the left side.        Posterior tibial pulses are 2+ on the right side, and 2+ on the left side.   Significant varicose veins to left lower extremity with venous congestion   Pulmonary/Chest: Effort normal and breath sounds normal.   Abdominal: Soft. Normal aorta and bowel sounds are normal. There is no hepatosplenomegaly.   Musculoskeletal: Normal range of motion.   Neurological: She is alert and oriented to person, place, and time. No cranial nerve deficit.   Skin: Skin is warm and dry. She is not diaphoretic.   Psychiatric: She has a normal mood and affect. Her behavior is normal. Judgment and thought content normal.   Nursing note and vitals reviewed.      Us Ankle / Brachial Indices Extremity Complete    Result Date: 3/6/2019  Narrative:  History: PAD  Comments: Bilateral lower extremity arterial with multi-level pulse volume recordings and segmental pressures were performed at rest and stress.  The right ankle/brachial index is 0.98. Doppler waveforms are biphasic and PVR waveforms at the ankle are normal appearing with no significant dampening.These findings are consistent with no significant arterial insufficiency of the right lower extremity at rest.  Post exercise SHEREEN is 0.92.  The left ankle/brachial index is 1.03. Doppler waveforms are biphasic and PVR waveforms at the ankle are normal appearing with no significant dampening. These findings are consistent with no significant arterial insufficiency of the left lower extremity at rest.    Postexercise SHEREEN is 0.96.      Impression: Impression: 1. No  significant arterial insufficiency of the right lower extremity at rest. 2. No significant arterial insufficiency of the left lower extremity at rest.   This report was finalized on 03/06/2019 16:01 by Dr. Haider Clay MD.      Patient Active Problem List   Diagnosis   • Left upper arm pain   • Varicose veins of lower extremity with pain   • Hypertension   • Hyperlipidemia   • PAD (peripheral artery disease) (CMS/[facility])         ICD-10-CM ICD-9-CM   1. PAD (peripheral artery disease) (CMS/[facility]) I73.9 443.9   2. Essential hypertension I10 401.9   3. Hyperlipidemia, unspecified hyperlipidemia type E78.5 272.4         Plan: After thoroughly evaluating Mya Chung, I believe the best course of action is to remain conservative from a vascular standpoint.  She states her legs feel great.  She has continued to walk as much as possible.  I did review her testing and is within normal limits.  We will see her back in 6 months with repeat noninvasive testing for continued surveillance, including ABIs.   I did also give her a prescription for compression stockings in the 15-20 mmHg.  We did instruct her on how to wear these on a daily basis.  I did discuss vascular risk factors as they pertain to the progression of vascular disease including controlling her hypertension and hyperlipidemia.  Body mass index is 20.77 kg/m². The patient can continue taking her current medication regimen as previously planned.  This was all discussed in full with complete understanding.    Thank you for allowing me to participate in the care of your patient.  Please do not hesitate with any questions or concerns.  I will keep you aware of any further encounters with Mya Chung.        Sincerely yours,         Saumya Morales, ANGY Pereira, Delfina Whitney, DO

## 2019-06-13 NOTE — TELEPHONE ENCOUNTER
Called patient and left message to call clinic back.       Melinda ESCOTO CMA ([facility])

## 2019-06-14 ENCOUNTER — MEDICAL CORRESPONDENCE (OUTPATIENT)
Dept: HEALTH INFORMATION MANAGEMENT | Facility: CLINIC | Age: 79
End: 2019-06-14

## 2019-06-21 ENCOUNTER — TELEPHONE (OUTPATIENT)
Dept: PALLIATIVE MEDICINE | Facility: CLINIC | Age: 79
End: 2019-06-21

## 2019-06-21 NOTE — TELEPHONE ENCOUNTER
Pre-screening Questions for Radiology Injections:    Injection to be done at which interventional clinic site? [facility] [place]    Instruct patient to arrive as directed prior to the scheduled appointment time:    [place] minutes before      [place]: 30 minutes before; if IV needed 1 hour before     Procedure ordered by satish    Procedure ordered? Procedure Order TBD by pain provider - Location: Lumbar         Transforaminal Cervical OLIVIA - Dr. Mimi Howard ONLY    What insurance would patient like us to bill for this procedure? [employer]      Worker's comp or MVA (motor vehicle accident) -Any injection DO NOT SCHEDULE and route to Roseanne Stefano.      [employer] insurance - For SI joint injections, DO NOT SCHEDULE and route Roseanne Stefano.       [employer] - Any injection besides hip/shoulder/knee joint DO NOT SCHEDULE and route to Roseanne Agarwal. She will obtain PA and call pt back to schedule procedure or notify pt of denial.       [employer]-Route to Roseanne for review      IF SCHEDULING IN [place] AND NEEDS A PA, IT IS OKAY TO SCHEDULE. [place] HANDLES THEIR OWN PA'S AFTER THE PATIENT IS SCHEDULED. PLEASE SCHEDULE AT LEAST 1 WEEK OUT SO A PA CAN BE OBTAINED.      Any chance of pregnancy? NO   If YES, do NOT schedule and route to RN pool    Is an  needed? No     Patient has a drive home? (mandatory) YES:     Is patient taking any blood thinners (plavix, coumadin, jantoven, warfarin, heparin, pradaxa or dabigatran )? No   If hold needed, do NOT schedule, route to RN pool     Is patient taking any aspirin products (includes Excedrin and Fiorinal)? No     If more than 325mg/day do NOT schedule; route to RN pool     For CERVICAL procedures, hold all aspirin products for 6 days.     Tell pt that if aspirin product is not held for 6 days, the procedure WILL BE cancelled.      Does the patient have a bleeding or clotting disorder? No     If YES, okay to schedule AND route to RN nurse pool    For  any patients with platelet count <100, must be forwarded to provider    Is patient diabetic?  No  If YES, have them bring their glucometer.    Does patient have an active infection or treated for one within the past week? No     Is patient currently taking any antibiotics?  No     For patients on chronic, preventative, or prophylactic antibiotics, procedures may be scheduled.     For patients on antibiotics for active or recent infection:antibiotic course must have been completed for 4 days    Is patient currently taking any steroid medications? (i.e. Prednisone, Medrol)  No     For patients on steroid medications, course must have been completed for 4 days    Reviewed with patient:  If you are started on any steroids or antibiotics between now and your appointment, you must contact us because the procedure may need to be cancelled.  Yes    Is patient actively being treated for cancer or immunocompromised? No  If YES, do NOT schedule and route to RN pool     Are you able to get on and off an exam table with minimal or no assistance? Yes  If NO, do NOT schedule and route to RN pool    Are you able to roll over and lay on your stomach with minimal or no assistance? Yes  If NO, do NOT schedule and route to RN pool     Any allergies to contrast dye, iodine, shellfish, or numbing and steroid medications? No  If YES, route to RN pool AND add allergy information to appointment notes    Allergies: Abatacept; Celebrex [celecoxib]; Celecoxib; Ethanol; Orencia [abatacept]; Septra [bactrim]; Sulfa drugs; Sulfamethoxazole-trimethoprim; Tramadol; Valdecoxib; Adhesive tape; and Antihistamines, chlorpheniramine-type  [alkylamines]      Has the patient had a flu shot or any other vaccinations within 7 days before or after the procedure.  No     Does patient have an MRI/CT?  YES:   (SI joint, hip injections, lumbar sympathetic blocks, and stellate ganglion blocks do not require an MRI)    Was the MRI done w/in the last 3 years?   Yes    Was MRI done at [place]? Yes      If not, where was it done? N/A       If MRI was not done at [place], [facility] or [facility] do NOT schedule and route to nursing.  If pt has an imaging disc, the injection may be scheduled but pt has to bring disc to appt. If they show up w/out disc the injection cannot be done    Reminders (please tell patient if applicable):       Instructed pt to arrive 30 minutes early for IV start if this is for a cervical procedure, ALL sympathetic (stellate ganglion, hypogastric, or lumbar sympathetic block) and all sedation procedures (RFA, spinal cord stimulation trials).  Not Applicable   -IVs are not routinely placed for Dr. Renteria cervical cases   -Dr. Herrera: IVs for cervical ESIs and cervical TBDs (not CMBBs/facet inj)      If NPO for sedation, informed patient that it is okay to take medications with sips of water (except if they are to hold blood thinners).  Not Applicable   *DO take blood pressure medication if it is prescribed*      If this is for a cervical OLIVIA, informed patient that aspirin needs to be held for 6 days.   Not Applicable      For all patients not having spinal cord stimulator (SCS) trials or radiofrequency ablations (RFAs), informed patient:    IV sedation is not provided for this procedure.  If you feel that an oral anti-anxiety medication is needed, you can discuss this further with your referring provider or primary care provider.  The Pain Clinic provider will discuss specifics of what the procedure includes at your appointment.  Most procedures last 10-20 minutes.  We use numbing medications to help with any discomfort during the procedure.  Not Applicable      Do not schedule procedures requiring IV placement in the first appointment of the day or first appointment after lunch. Do NOT schedule at 0745, 0815 or 1245.       For patients 85 or older we recommend having an adult stay w/ them for the remainder of the day.       Does the patient have any  questions?    Roseanne Agarwal  [facility]

## 2019-07-01 ENCOUNTER — RADIOLOGY INJECTION OFFICE VISIT (OUTPATIENT)
Dept: PALLIATIVE MEDICINE | Facility: CLINIC | Age: 79
End: 2019-07-01
Payer: COMMERCIAL

## 2019-07-01 ENCOUNTER — ANCILLARY PROCEDURE (OUTPATIENT)
Dept: RADIOLOGY | Facility: CLINIC | Age: 79
End: 2019-07-01
Attending: PSYCHIATRY & NEUROLOGY
Payer: COMMERCIAL

## 2019-07-01 VITALS
HEART RATE: 88 BPM | RESPIRATION RATE: 16 BRPM | SYSTOLIC BLOOD PRESSURE: 133 MMHG | OXYGEN SATURATION: 98 % | DIASTOLIC BLOOD PRESSURE: 72 MMHG

## 2019-07-01 DIAGNOSIS — M54.16 LUMBAR RADICULOPATHY: Primary | ICD-10-CM

## 2019-07-01 DIAGNOSIS — M54.16 LUMBAR RADICULOPATHY: ICD-10-CM

## 2019-07-01 PROCEDURE — 62323 NJX INTERLAMINAR LMBR/SAC: CPT | Performed by: PSYCHIATRY & NEUROLOGY

## 2019-07-01 ASSESSMENT — PAIN SCALES - GENERAL: PAINLEVEL: MILD PAIN (3)

## 2019-07-01 NOTE — NURSING NOTE
Discharge Information    IV Discontiued Time:  NA    Amount of Fluid Infused:  NA    Discharge Criteria = When patient returns to baseline or as per MD order    Consciousness:  Pt is fully awake    Circulation:  BP +/- 20% of pre-procedure level    Respiration:  Patient is able to breathe deeply    O2 Sat:  Patient is able to maintain O2 Sat >92% on room air    Activity:  Moves 4 extremities on command    Ambulation:  Patient is able to stand and walk or stand and pivot into wheelchair    Dressing:  Clean/dry or No Dressing    Notes:   Discharge instructions and AVS given to patient    Patient meets criteria for discharge?  YES    Admitted to PCU?  No    Responsible adult present to accompany patient home?  Yes    Signature/Title:    Aydin Duong RN Care Coordinator  [facility] [place]     16-Jul-2018 13:28

## 2019-07-01 NOTE — PROGRESS NOTES
percoPre procedure Diagnosis: lumbar radiculopathy    Post procedure Diagnosis: Same  Procedure performed: caudal epidural steroid injection   Anesthesia: none  Complications: none   Operators: Tati Hare MD     Indications:   Ginger Marshall is a 79 year old female was sent by Dr. Vázquez for lumbar injection.  They have a history of low back and bilateral leg pain.  Exam shows forward flexed posture, difficulty with sit to stand and they have tried conservative treatment including medications.    MRI was done on 6/13/19 which showed   1. Anterior and posterior fusion at L3-L4. This is unchanged.  2. Multilevel degenerative change. When compared to 3/15/2018, there  has been no significant change.  3. No new focal left-sided disc herniations are identified.    Options/alternatives, benefits and risks were discussed with the patient including bleeding, infection, no pain relief, tissue trauma, exposure to radiation, spinal cord injury, dural puncture, headache, weakness, numbness, paralysis, reaction to medications including seizure, stroke, and coma.  Questions were answered to her satisfaction and she agrees to proceed. Voluntary informed consent was obtained and signed.     Vitals were reviewed: Yes  Allergies were reviewed:  Yes   Medications were reviewed:  Yes   Pre-procedure pain score: 3/10    Procedure:  After getting informed consent, patient was brought into the procedure suite and was placed in a prone position on the procedure table.   A Pause for the Cause was performed.  Patient was prepped and draped in sterile fashion.     The sacral hiatus and cornua were palpated.  Under lateral fluoroscopic guidance sacral hiatus was identified.  1 ml of lidocaine 1% was then injected at the needle entry point to anesthetize the skin. Then a 22 gauge 5 inch quincke type spinal needle was inserted into sacral hiatus.  Once the needle was advanced through the sacral hiatus, the needle angle was decreased to allow  entrance into the sacral canal and epidural space.  This was verified in both the AP and lateral view for correct alignment.   The needle was advanced using intermittent fluoroscopy.  After negative aspiration for heme and CSF, 2ml of Omnipaque 300 contrast dye was injected.  The contrast showed good epidural spread, and was verified in both the AP and lateral view. 8ml was wasted.    The injection was then accomplished with 2ml of  0.2% ropivacaine, 10mg of dexamethasone and 2ml of preservative free saline.  Spread was noted up to L4. The needle was withdrawn, patient's back was cleaned, and they were brought to recovery area.       Hemostasis was achieved, the area was cleaned, and bandaids were placed when appropriate.  The patient tolerated the procedure well, and was taken to the recovery room.    Images were saved to PACS.    Post-procedure pain score: 1/10  Follow-up includes:   -f/u phone call in one week  -f/u with referring provider    Tati Hare MD  [facility]

## 2019-07-01 NOTE — PATIENT INSTRUCTIONS
[facility]   Procedure Discharge Instructions    Today you saw:   Dr. Ana Hare      You had an: Caudal Epidural steroid injection      Medications used:  Lidocaine  Dexamethasone   Omnipaque  Ropivicaine   Normal saline              If you were holding your blood thinning medication, please restart taking it: N/A    Be cautious when walking. Numbness and/or weakness in the lower extremities may occur for up to 6-8 hours after the procedure due to effect of the local anesthetic    Do not drive for 6 hours. The effect of the local anesthetic could slow your reflexes.     You may resume your regular activities after 24 hours    Avoid strenuous activity for the first 24 hours    You may shower, however avoid swimming, tub baths or hot tubs for 24 hours following your procedure    You may have a mild to moderate increase in pain for several days following the injection.    It may take up to 14 days for the steroid medication to start working although you may feel the effect as early as a few days after the procedure.       You may use ice packs for 10-15 minutes, 3 to 4 times a day at the injection site for comfort    Do not use heat to painful areas for 6 to 8 hours. This will give the local anesthetic time to wear off and prevent you from accidentally burning your skin.     Unless you have been directed to avoid the use of anti-inflammatory medications (NSAIDS), you may use medications such as ibuprofen, Aleve or Tylenol for pain control if needed.     Possible side effects of steroids that you may experience include flushing, elevated blood pressure, increased appetite, mild headaches and restlessness.  All of these symptoms will get better with time.    If you experience any of the following, call the Pain Clinic during work hours at [phone] or the Provider Line after hours at [phone]:  -Fever over 100 degree F  -Swelling, bleeding, redness, drainage, warmth at the injection  site  -Progressive weakness or numbness in your legs   -Loss of bowel or bladder function  -Unusual new onset of pain that is not improving

## 2019-07-01 NOTE — NURSING NOTE
Pre-procedure Intake    Have you been fasting? NA    If yes, for how long? NO     Are you taking a prescribed blood thinner such as coumadin, Plavix, Xarelto?    No    If yes, when did you take your last dose? No     Do you take aspirin?  No    If cervical procedure, have you held aspirin for 6 days?   No     Do you have any allergies to contrast dye, iodine, steroid and/or numbing medications?  NO    Are you currently taking antibiotics or have an active infection?  NO    Have you had a fever/elevated temperature within the past week? NO    Are you currently taking oral steroids? NO    Do you have a ? Yes       Are you pregnant or breastfeeding?  NO    Are the vital signs normal?  Yes  Edgardo Lind MA

## 2019-07-09 ENCOUNTER — TELEPHONE (OUTPATIENT)
Dept: PALLIATIVE MEDICINE | Facility: CLINIC | Age: 79
End: 2019-07-09

## 2019-07-09 NOTE — TELEPHONE ENCOUNTER
Patient had a caudal epidural steroid  injection on 7/1/19.  Called patient for an update.      Left message that we were calling for an update about how s/he was doing after the injection.  LM that if s/he has any problems or questions to call the clinic at [phone].

## 2019-07-11 ENCOUNTER — OFFICE VISIT (OUTPATIENT)
Dept: RHEUMATOLOGY | Facility: CLINIC | Age: 79
End: 2019-07-11
Payer: COMMERCIAL

## 2019-07-11 VITALS
HEART RATE: 77 BPM | OXYGEN SATURATION: 93 % | DIASTOLIC BLOOD PRESSURE: 68 MMHG | SYSTOLIC BLOOD PRESSURE: 122 MMHG | HEIGHT: 68 IN | BODY MASS INDEX: 31.25 KG/M2 | WEIGHT: 206.2 LBS

## 2019-07-11 DIAGNOSIS — M05.79 RHEUMATOID ARTHRITIS INVOLVING MULTIPLE SITES WITH POSITIVE RHEUMATOID FACTOR (H): Primary | Chronic | ICD-10-CM

## 2019-07-11 DIAGNOSIS — M79.671 RIGHT FOOT PAIN: ICD-10-CM

## 2019-07-11 DIAGNOSIS — M79.641 RIGHT HAND PAIN: ICD-10-CM

## 2019-07-11 DIAGNOSIS — Z79.899 HIGH RISK MEDICATIONS (NOT ANTICOAGULANTS) LONG-TERM USE: ICD-10-CM

## 2019-07-11 PROCEDURE — 99214 OFFICE O/P EST MOD 30 MIN: CPT | Performed by: INTERNAL MEDICINE

## 2019-07-11 ASSESSMENT — MIFFLIN-ST. JEOR: SCORE: 1460.88

## 2019-07-11 NOTE — PROGRESS NOTES
Rheumatology Clinic Visit      Ginger Marshall MRN# [id number]   YOB: 1940 Age: 79 year old      Date of visit: 7/11/19   PCP: Georgia Vázquez MD     Chief Complaint   Patient presents with:  Arthritis: RA. Patient is having back pain, no strength in legs, pain in hands. Does not think medication is helping.    Assessment and Plan   1. Seropositive ( [2009], CCP >100 [2009]; hx of rheumatoid nodule by 6/14/2011 pathology) Erosive Rheumatoid Arthritis: Previously failed remicade (staph infection during therapy, but was immediately after a joint injection) and orencia (migraines).  Sulfa allergy.  Currently on methotrexate 25 mg SQ once weekly, folic acid 800 mcg daily, Xeljanz XR 11mg daily, HCQ 200mg BID. She also has prednisone 20 mg daily ×5 days to use as needed for flare. Methotrexate was reduced in the past with worsening symptoms; later increased and changed to SQ for improved bioavailability. HCQ being used and she is not sure if it is helping; okay to stop HCQ and see how she does.  Doing well today.   Note that she had a history of oral sore that resolved with folic acid 2mg daily; she ran out of Rx'd folic acid and did not want to restart it yet; she takes other supplements that have a total of 800mcg of folic acid; if needed in the future may restart additional folic acid.    - Continue methotrexate from 25mg SQ once weekly  - Continue folic acid 800mcg daily as noted above (from over-the-counter supplements)  - Discontinue hydroxychloroquine 200mg BID   - Continue Xeljanz XR 11mg daily  - Labs every 3 months: CBC, Creatinine, Hepatic Panel              Rapid 3, cumulative scores                       07/11/2019: 8.2 (MTX 25mg PO wkly, HCQ 400mg daily, Xeljanz XR 11mg daily)                        03/06/2019: ?    (MTX 25mg PO wkly, HCQ 400mg daily, Xeljanz XR 11mg daily)                       12/12/2018: 7.5 (MTX 25mg wkly, HCQ 400mg daily, Xeljanz XR 11mg daily)                        08/08/2018: 7    (MTX 22.5mg wkly, Xeljanz XR 11mg daily)                       02/07/2018: 4    (MTX 22.5mg wkly, Xeljanz XR 11mg daily)                       11/08/2017: 5    (MTX 22.5mg wkly, Xeljanz XR 11mg daily)                       08/09/2017: 0    (MTX 22.5mg wkly, Xeljanz XR 11mg daily)                      05/10/2017: 5    (MTX 25mg wkly, Xeljanz XR 11mg daily) RA doing well    2. Right hip pain: Status post WALTER twice in the past. Followed by [facility].     3. Osteoporosis: was previously managed by endocrinology and she received reclast once in 2016.  She does not want to use any osteoporosis medication at this time.  We discussed the tx options; risks of treatment and risks of not treating.  12/12/2018 DEXA ordered by Dr. Vázquez showed osteoporosis.      4. Iron Deficiency Anemia: Managed by PCP.     5. Left 1st toe pain: 2 episodes of sudden onset left toe pain followed by diffuse left foot pain that made ambulation difficult.  Also with nodules over both Achilles tendons and the right elbow that are either rheumatoid nodules or tophi.  She reports having history of gout decades ago.  Denies ever being on colchicine or allopurinol.  Discussed colchicine.  Check uric acid today.  - Colchicine: (MITIGARE) 0.6 MG capsule; Take 2 capsules (1.2 mg) by mouth once for 1 dose at the onset of a gout flare, followed by 1 capsule (0.6mg) 1 hour later.  Dispense: 3 capsule; Refill: 0    6.  Right 5th metacarpal pain and inability to fully extend the right 3rd PIP (since right 3rd MCP arthroplasty): discussed option of x-ray today; but follows with hand surgeon, Dr. Hilario Redmond who is at [facility], so will refer there where an x-ray can be considered if needed so it is available for him to review  - Orthopedics referral    7. Hypertrophy on the right lateral mid-foot: bothering her so she is going to follow-up with Dr. Asif in podiatry.     8.  Vaccinations: Vaccinations reviewed with Ms.  Joanna.   - Influenza: encouraged yearly vaccination   - [id number]: up to date  - [id number]: up to date  - Shingrix: Up to date    Ms. Marshall verbalized agreement with and understanding of the rational for the diagnosis and treatment plan.  All questions were answered to best of my ability and the patient's satisfaction. Ms. Marshall was advised to contact the clinic with any questions that may arise after the clinic visit.      Thank you for involving me in the care of the patient    Return to clinic: 3-4 months    HPI   Ginger Marshall is a 79 year old female with a history of multiple foot surgeries, hand tendon transfers, MCP replacements (most recent being in August 2015), bilateral TKA, right WALTER, left distal radius fracture history, gout, s/p lumbar fusion, osteoporosis and seropositive (RF+, CCP+) erosive rheumatoid arthritis who presents for follow-up of rheumatoid arthritis.      Today, Ms. Marshall reports that she is doing okay except for some pain at her right fifth metacarpal and a bump on the middle of her right fifth metatarsal, both worse with activity and the foot is now bothersome with the shoes she wears.  She would like to see an orthopedic surgeon for her hand.  She previously followed by Dr. Redmond who did arthroplasties of her MCPs so she would like to see him again for the hand pain where she says that she can have the x-ray done.  She is also following with podiatry and will follow-up in podiatry for the foot pain.  Tolerating medications well.  Does not feel like hydroxychloroquine has helped her much.  Tolerating methotrexate.  She would like to stop hydroxychloroquine to see how she does without it.  Does not want to start osteoporosis treatment    Denies fevers, chills, nausea, vomiting, constipation, diarrhea. No abdominal pain. No chest pain/pressure, palpitations, or shortness of breath. No nasal or oral sores.   No neck pain. No rash. No LE swelling.     Tobacco: quit in 1999  EtOH: 1  glass of wine every month at most  Drugs: None  Occupation: , retired     ROS   GEN: No fevers, chills  SKIN: No itching, rashes, sores  HEENT:  No oral or nasal ulcers.  CV: No chest pain, pressure, palpitations, or dyspnea on exertion.  PULM: No SOB, wheeze, cough.  GI:  No nausea, vomiting, constipation, diarrhea. No blood in stool. No abdominal pain.  : No blood in urine.  MSK: See HPI.  NEURO: No numbness or tingling  EXT: No LE swelling    Active Problem List     Patient Active Problem List   Diagnosis     RA (Rheumatoid Arthritis) off Plaquenil     Menopause     History of colonic polyps     Mitral Regurgitation     Multinodular goiter     CARDIOVASCULAR SCREENING; LDL GOAL LESS THAN 130     Psychophysiologic insomnia     Pulmonary nodule     PSEUDOPHAKIA OU     PVD (POSTERIOR VITREOUS DETACHMENT) OU     PXF (PSEUDOEXFOLIATION OF LENS CAPSULE) OD     GERD (gastroesophageal reflux disease)     Hyperlipidemia LDL goal <100     Advance Care Planning     Neuropathy     SHERRY (obstructive sleep apnea)-severe (AHI 35)     zEncounter for counseling     Anemia of chronic disease     Migraine     Osteoporosis     Cornea guttata, ou     Conjunctival concretions     Stenosis, spinal, lumbar     Other chronic pain     Obesity     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     MGD (meibomian gland dysfunction)     Blepharitis of both eyes     Personal history of healed osteoporosis fracture     Iron malabsorption     Hyperglycemia     Chronic bilateral low back pain without sciatica     Allergic state, subsequent encounter     BMI 32.0-32.9,adult     Spinal stenosis of lumbar region without neurogenic claudication     Herniated nucleus pulposus, L3-4     S/P lumbar fusion     Anxiety     Low iron     BMI 28.0-28.9,adult     History of depression     Past Medical History     Past Medical History:   Diagnosis Date     Acute posthemorrhagic anemia 10/13/2012     Ex-smoker 01/99     History of  "blood transfusion      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     History of total knee replacement 7/23/2009     Menopause late 40's     Other chronic pain     joints     Pelvic fracture (H) 5/13/2014     PUD (peptic ulcer disease)      Rheumatoid arteritis      Sleep apnea     Uses a CPAP     Vitamin B12 deficiency      Past Surgical History     Past Surgical History:   Procedure Laterality Date     ABDOMEN SURGERY      c-sections     ARTHROSCOPY KNEE RT/LT  01/06    left     BACK SURGERY  2013    disc     BREAST BIOPSY, RT/LT      left benign     BREAST SURGERY  90's    lumpectomy     C ANESTH,TOTAL HIP ARTHROPLASTY  2010    Rt hip     C HAND/FINGER SURGERY UNLISTED  11/05    right hand     C NONSPECIFIC PROCEDURE  91    left foot surgery     C NONSPECIFIC PROCEDURE  95    R MCP surgery     C TOTAL KNEE ARTHROPLASTY  2006    left     C/SECTION, LOW TRANSVERSE  66,72    x 2     CATARACT IOL, RT/LT       COLONOSCOPY  2006,2009     EYE SURGERY      cataracs     HC ESOPH/GAS REFLUX TEST W NASAL IMPED >1 HR  2/1/2012    Procedure:ESOPHAGEAL IMPEDENCE FUNCTION TEST WITH 24 HOUR PH GREATER THAN 1 HOUR; Surgeon:KAYKAY JULIO; Location:[facility]     IR CAUDAL EPIDURAL INJECTION SINGLE  5/2/2012    LESI L5-S1 at [facility]     OPTICAL TRACKING SYSTEM FUSION POSTERIOR SPINE LUMBAR N/A 4/3/2017    Procedure: OPTICAL TRACKING SYSTEM FUSION SPINE POSTERIOR LUMBAR ONE LEVEL;  Surgeon: Anthony Michele MD;  Location:  OR     SURGICAL HISTORY OF -   2007    right knee total replacement     Allergy     Allergies   Allergen Reactions     Abatacept Other (See Comments)     Severe headaches  Migraine     Celebrex [Celecoxib]      Ineffective     Celecoxib Unknown and Nausea     Ethanol      Antihistamines     Orencia [Abatacept]      Headache     Septra [Bactrim]      Sulfa Drugs Nausea and Vomiting     \"deathly ill\"  Allergic to everything with Sulfa in it.     Sulfamethoxazole-Trimethoprim Nausea     Tramadol Other " "(See Comments)     Headache  Migraine headache     Valdecoxib Unknown     Made me \"very very ill\", might of been \"cramping\"     Adhesive Tape Rash     Plastic tape  Plastic tapes     Antihistamines, Chlorpheniramine-Type  [Alkylamines] Anxiety and Other (See Comments)     Current Medication List     Current Outpatient Medications   Medication Sig     acetaminophen (TYLENOL) 500 MG tablet Take 2 tablets (1,000 mg) by mouth every 8 hours as needed for pain     atorvastatin (LIPITOR) 40 MG tablet Take 1 tablet (40 mg) by mouth daily     Cholecalciferol (VITAMIN D-3 PO) Take 1,000 Units by mouth 2 times daily     FOLIC ACID PO Take 800 mcg by mouth daily     hydroxychloroquine (PLAQUENIL) 200 MG tablet Take 1 tablet (200 mg) by mouth 2 times daily (Patient not taking: Reported on 7/1/2019)     ibuprofen (ADVIL) 200 MG tablet Take 200 mg by mouth every 4 hours as needed for mild pain     ipratropium (ATROVENT) 0.06 % nasal spray Spray 2 sprays into both nostrils 4 times daily as needed for rhinitis     Methotrexate, PF, (RASUVO) 25 MG/0.5ML autoinjector Inject 0.5 mLs (25 mg) Subcutaneous every 7 days . Hold for signs of infection, and seek medical attention.     multivitamin, therapeutic with minerals (MULTI-VITAMIN) TABS Take 1 tablet by mouth daily     naproxen sodium (ANAPROX) 220 MG tablet Take 220 mg by mouth 2 times daily (with meals)     pantoprazole (PROTONIX) 40 MG EC tablet TAKE ONE TABLET BY MOUTH EVERY DAY 30-60 MINUTES BEFORE A MEAL     pantoprazole (PROTONIX) 40 MG EC tablet TAKE ONE TABLET BY MOUTH EVERY DAY 30-60 MINUTES BEFORE A MEAL     Pseudoephedrine-Guaifenesin (MUCINEX D PO)      tofacitinib (XELJANZ XR) 11 MG 24 hr tablet Take 1 tablet (11 mg) by mouth daily     topiramate (TOPAMAX) 25 MG tablet Take 1 tablet (25 mg) at bedtime for 1 week, then 1 tablet twice daily for 1 week, then 1 tablet in AM and 2 in PM for 1 week, then 2 tablets twice daily.     No current facility-administered medications " "for this visit.      Social History   See HPI    Family History     Family History   Problem Relation Age of Onset     Arthritis Mother      Alzheimer Disease Mother      Hyperlipidemia Mother      Osteoporosis Mother      Heart Disease Father         MI ( from this)     Alcohol/Drug Father      Arthritis Sister      Hypertension Sister      Cancer Son      Diabetes Son      Neurologic Disorder Sister         Schizophrenic     Hypertension Sister      Hyperlipidemia Sister      Mental Illness Sister      Diabetes Son      Other Cancer Son      No change in family history since the previous clinic visit.    Physical Exam     Temp Readings from Last 3 Encounters:   19 98.4  F (36.9  C) (Oral)   18 97.9  F (36.6  C) (Oral)   18 97  F (36.1  C) (Oral)     BP Readings from Last 5 Encounters:   19 133/72   19 122/70   19 118/74   19 124/77   19 129/79     Pulse Readings from Last 1 Encounters:   19 88     Resp Readings from Last 1 Encounters:   19 16     Estimated body mass index is 32.31 kg/m  as calculated from the following:    Height as of this encounter: 1.715 m (5' 7.5\").    Weight as of 19: 95 kg (209 lb 6.4 oz).    GEN: NAD   HEENT: MMM. No oral lesions. Anicteric, noninjected sclera  CV: S1, S2. RRR. No m/r/g.  PULM: CTA bilaterally. No w/c.  MSK: MCPs and PIPs without synovial swelling or tenderness palpation.  Inability to fully extend the right third PIP.  Right fifth metatarsal nontender to palpation.  Subluxation of the bilateral MCPs. Right hand with old surgical scars over the MCPs.   Wrists, elbows, shoulders, knees, ankles, and MTPs without swelling or tenderness palpation.   Hypertrophy at the right lateral midfoot.   SKIN: No rash  EXT: No LE edema  PSYCH: Alert. Appropriate.     Labs   RF/CCP  Recent Labs   Lab Test 11  0843   RHF 38*     CBC  Recent Labs   Lab Test 19  0956 19  1012 19  0950   WBC 5.5 6.7 9.1 "   RBC 3.86 3.91 4.07   HGB 12.0 12.1 12.6   HCT 37.2 37.8 40.0   MCV 96 97 98   RDW 14.9 14.4 14.4    338 424   MCH 31.1 30.9 31.0   MCHC 32.3 32.0 31.5   NEUTROPHIL 60.5 57.6 78.7   LYMPH 20.1 20.7 9.5   MONOCYTE 13.5 15.7 9.0   EOSINOPHIL 5.7 5.7 2.6   BASOPHIL 0.2 0.3 0.2   ANEU 3.3 3.9 7.1   ALYM 1.1 1.4 0.9   MARYURI 0.7 1.1 0.8   AEOS 0.3 0.4 0.2   ABAS 0.0 0.0 0.0     CMP  Recent Labs   Lab Test 06/12/19  0956 05/08/19  1012 04/09/19  0950 03/06/19  1351  11/23/18  1309  03/27/17  1142  01/27/17  1511   NA  --   --   --   --   --  141  --  139  --  142   POTASSIUM  --   --   --   --   --  3.9  --  4.0  --  4.1   CHLORIDE  --   --   --   --   --  107  --  107  --  107   CO2  --   --   --   --   --  29  --  25  --  25   ANIONGAP  --   --   --   --   --  5  --  7  --  10   GLC  --   --   --   --   --  123*  --  96  --  98   BUN  --   --   --   --   --  14  --  11  --  10   CR 0.64 0.61 0.61 0.64   < > 0.59   < > 0.62   < > 0.62   GFRESTIMATED 86 87 87 86   < > >90   < > >90  Non  GFR Calc     < > >90  Non  GFR Calc     GFRESTBLACK >90 >90 >90 >90   < > >90   < > >90  African American GFR Calc     < > >90   GFR Calc     BRANDEE  --   --   --  9.3  --  9.2  --  9.7  --  9.9   BILITOTAL 0.4 0.3 0.4 0.3   < > 0.3   < >  --    < > 0.3   ALBUMIN 3.9 3.9 4.0 4.0   < > 3.9   < >  --    < > 3.8   PROTTOTAL 7.7 7.6 8.0 7.6   < > 7.6   < >  --    < > 7.6   ALKPHOS 46 49 53 42   < > 55   < >  --    < > 45   AST 26 23 25 23   < > 29   < >  --    < > 35   ALT 32 36 30 32   < > 33   < >  --    < > 39    < > = values in this interval not displayed.     Calcium/VitaminD  Recent Labs   Lab Test 03/06/19  1351 11/23/18  1309 03/27/17  1142  10/28/16  1239  02/05/13  1050   BRANDEE 9.3 9.2 9.7   < > 9.4   < > 9.3   VITDT 41  --   --   --  37  --  33    < > = values in this interval not displayed.     ESR/CRP  Recent Labs   Lab Test 05/08/19  1012 03/06/19  1351 12/10/18  1051   SED 21 22  "17   CRP <2.9 <2.9 <2.9     Lipid Panel  Recent Labs   Lab Test 11/23/18  1309 12/14/17  0957 10/28/16  1238 12/30/15  0842 01/13/14  1109 07/24/13  1024  04/13/12  0831   CHOL  --  164 176 182 135 177  --  167   TRIG  --  121 103 70 89 80  --  115   HDL  --  79 77 92 49* 89  --  59   LDL 72 61 78 76 68 72   < > 85   VLDL  --   --   --   --  18 16 -- 23   CHOLHDLRATIO  --   --   --   --  2.8 2.0  --  2.8   NHDL  --  85 99 90  --   --   --   --     < > = values in this interval not displayed.     Hepatitis B  Recent Labs   Lab Test 09/15/15  1159 04/30/12  1005   AUSAB 0.11  --    HBCAB Nonreactive  --    HEPBANG Nonreactive Negative     Hepatitis C  Recent Labs   Lab Test 09/15/15  1159 04/30/12  1005   HCVAB Nonreactive   Assay performance characteristics have not been established for newborns,   infants, and children   Negative     Tuberculosis Screening  Recent Labs   Lab Test 05/07/18  1033 09/15/15  1200 04/30/12  1006   TBRSLT Negative Negative Negative   TBAGN 0.00 0.00 0.00       \"HAND BILATERAL THREE OR MORE VIEWS 9/15/2015 12:28 PM   HISTORY: Rheumatoid arthritis; establish baseline. Rheumatoid  arthritis(714.0)  COMPARISON: None  IMPRESSION  IMPRESSION: There is diffuse osteopenia. Postoperative changes of the  right second and third metacarpophalangeal joints. Moderate joint  space narrowing involving the left second and third  metacarpophalangeal joints. Mild joint space narrowing of the right  fourth and fifth metacarpophalangeal joints. There are also small  periarticular erosive changes of the metacarpophalangeal joints. There  is fusion of several of the right carpal bones. Marked joint space  loss in the left wrist. Findings are consistent with the clinical  history of rheumatoid arthritis. Chronic fracture deformities of the  right distal radius and ulna. No acute fracture is seen.  SPENCER MONSALVE MD\"    Immunization History     Immunization History   Administered Date(s) Administered     " Influenza (High Dose) 3 valent vaccine 10/28/2013, 09/23/2014, 11/11/2015, 09/23/2016     Influenza (IIV3) PF 10/14/2007, 10/21/2009     Influenza Vaccine Im 4yrs+ 4 Valent CCIIV4 09/28/2017, 09/27/2018     Mantoux Tuberculin Skin Test 11/03/2006     Pneumo Conj 13-V (2010&after) 07/29/2015     Pneumococcal 23 valent 06/26/2000, 06/02/2010     TD (ADULT, 7+) 07/20/2009     Tdap (Adult) Unspecified Formulation 12/12/2018     Zoster vaccine recombinant adjuvanted (SHINGRIX) 12/12/2018, 02/14/2019          Chart documentation done in part with Dragon Voice recognition Software. Although reviewed after completion, some word and grammatical error may remain.    Nico Byers MD

## 2019-07-11 NOTE — NURSING NOTE
RAPID3 (0-30) Cumulative Score  8.2          RAPID3 Weighted Score (divide #4 by 3 and that is the weighted score)  2.7     Samantha Morales [facility] Rheumatology  7/11/2019 10:29 AM

## 2019-07-11 NOTE — PATIENT INSTRUCTIONS
Rheumatology    Dr. Nico Byers         [facility]   (Monday)  [address]  [place] [address]       [facility]   (Tuesday)  [address]  [place] [address]    [facility]   (Wed., Thurs., and Friday)  [address] [place] [address]    Phone number: [phone]  Thank you for choosing [facility].  Samantha Morales CMA

## 2019-07-16 ENCOUNTER — ANCILLARY PROCEDURE (OUTPATIENT)
Dept: GENERAL RADIOLOGY | Facility: CLINIC | Age: 79
End: 2019-07-16
Attending: INTERNAL MEDICINE
Payer: COMMERCIAL

## 2019-07-16 ENCOUNTER — OFFICE VISIT (OUTPATIENT)
Dept: INTERNAL MEDICINE | Facility: CLINIC | Age: 79
End: 2019-07-16
Payer: COMMERCIAL

## 2019-07-16 VITALS
HEART RATE: 103 BPM | SYSTOLIC BLOOD PRESSURE: 104 MMHG | RESPIRATION RATE: 17 BRPM | OXYGEN SATURATION: 96 % | BODY MASS INDEX: 31.34 KG/M2 | TEMPERATURE: 97.1 F | DIASTOLIC BLOOD PRESSURE: 68 MMHG | HEIGHT: 68 IN | WEIGHT: 206.8 LBS

## 2019-07-16 DIAGNOSIS — E78.5 HYPERLIPIDEMIA LDL GOAL <100: Chronic | ICD-10-CM

## 2019-07-16 DIAGNOSIS — E66.09 NON MORBID OBESITY DUE TO EXCESS CALORIES: ICD-10-CM

## 2019-07-16 DIAGNOSIS — M51.369 DDD (DEGENERATIVE DISC DISEASE), LUMBAR: ICD-10-CM

## 2019-07-16 DIAGNOSIS — Z51.81 ENCOUNTER FOR THERAPEUTIC DRUG MONITORING: ICD-10-CM

## 2019-07-16 DIAGNOSIS — M79.604 ACUTE LEG PAIN, RIGHT: Primary | ICD-10-CM

## 2019-07-16 DIAGNOSIS — K21.9 GASTROESOPHAGEAL REFLUX DISEASE WITHOUT ESOPHAGITIS: Chronic | ICD-10-CM

## 2019-07-16 DIAGNOSIS — E04.2 MULTINODULAR GOITER: Chronic | ICD-10-CM

## 2019-07-16 DIAGNOSIS — M05.79 RHEUMATOID ARTHRITIS INVOLVING MULTIPLE SITES WITH POSITIVE RHEUMATOID FACTOR (H): ICD-10-CM

## 2019-07-16 PROCEDURE — 99207 C PAF COMPLETED  NO CHARGE: CPT | Performed by: INTERNAL MEDICINE

## 2019-07-16 PROCEDURE — 73502 X-RAY EXAM HIP UNI 2-3 VIEWS: CPT

## 2019-07-16 PROCEDURE — 99214 OFFICE O/P EST MOD 30 MIN: CPT | Performed by: INTERNAL MEDICINE

## 2019-07-16 RX ORDER — OXYCODONE HYDROCHLORIDE 5 MG/1
5 TABLET ORAL EVERY 6 HOURS PRN
Qty: 12 TABLET | Refills: 0 | Status: SHIPPED | OUTPATIENT
Start: 2019-07-16 | End: 2019-08-15

## 2019-07-16 RX ORDER — TOPIRAMATE 50 MG/1
50 TABLET, FILM COATED ORAL 2 TIMES DAILY
Qty: 180 TABLET | Refills: 11 | Status: SHIPPED | OUTPATIENT
Start: 2019-07-16 | End: 2020-03-27

## 2019-07-16 ASSESSMENT — PAIN SCALES - GENERAL: PAINLEVEL: SEVERE PAIN (7)

## 2019-07-16 ASSESSMENT — MIFFLIN-ST. JEOR: SCORE: 1463.6

## 2019-07-16 NOTE — PATIENT INSTRUCTIONS
Oxycodone and rest for a few days.  Physical therapy next week.  If no improvement, contact me and we will need to do a CT of the hip.  Get my labs done with your next lab draw for Dr. Byers.     Patient Education     Tips to Control Acid Reflux    To control acid reflux, you ll need to make some basic diet and lifestyle changes. The simple steps outlined below may be all you ll need to ease discomfort.  Watch what you eat    Avoid fatty foods and spicy foods.    Eat fewer acidic foods, such as citrus and tomato-based foods. These can increase symptoms.    Limit drinking alcohol, caffeine, and fizzy beverages. All increase acid reflux.    Try limiting chocolate, peppermint, and spearmint. These can worsen acid reflux in some people.  Watch when you eat    Avoid lying down for 3 hours after eating.    Do not snack before going to bed.  Raise your head  Raising your head and upper body by 4 to 6 inches helps limit reflux when you re lying down. Put blocks under the head of your bed frame to raise it.  Other changes    Lose weight, if you need to    Don t exercise near bedtime    Avoid tight-fitting clothes    Limit aspirin and ibuprofen    Stop smoking   Date Last Reviewed: 7/1/2016 2000-2018 [employer]. [address] [place] [id number], [place] [address]. All rights reserved. This information is not intended as a substitute for professional medical care. Always follow your healthcare professional's instructions.

## 2019-07-16 NOTE — PROGRESS NOTES
"Subjective     Ginger Marshall is a 79 year old female who presents to clinic today for the following health issues:    Patient is here today for a one month follow up after starting the topiramate (TOPAMAX) 25 MG tablet she is also here today to follow up on her back pain which is still there. She states some days her back feels fine and other days is painful, she is also having a lot of pain in her legs she is unsure if its from the back pain or if this is a new issue. She states she did get an injection for the pain and they suggested her to go to physical therapy so she is possibly wanting a referral.      She bent down to  a box yesterday and had sudden onset pain in the right hip and then it shot down to the calf on the right.  It was severe.  Sitting makes it feel better as well as ice.  Laying down helps too.  Walking makes it worse.      This new pain is overshadowing all other pain.  She had an epidural but doesn't know if it worked.      Taking her cholesterol medication. The patient complains of typical reflux symptoms: burning sensation after heavy meals, especially when lying down, sometimes with true waterbrash. Denies dysphagia, black or bloody stools or abdominal pain.  She takes high dose PPI and tums.    History of thyroid nodules but no biopsy.  No change in nodules in 9 years.     Normal clock draw today for dementia.              Reviewed and updated as needed this visit by Provider  Tobacco  Allergies  Meds  Problems  Med Hx  Surg Hx  Fam Hx         Review of Systems    ROS: 10 point ROS neg other than the symptoms noted above in the HPI.       Objective    /68 (BP Location: Left arm, Cuff Size: Adult Large)   Pulse 103   Temp 97.1  F (36.2  C) (Oral)   Resp 17   Ht 1.715 m (5' 7.5\")   Wt 93.8 kg (206 lb 12.8 oz)   SpO2 96%   BMI 31.91 kg/m    Body mass index is 31.91 kg/m .  Physical Exam   GENERAL APPEARANCE: healthy, alert and no distress  RESP: lungs clear to " auscultation - no rales, rhonchi or wheezes  CV: regular rates and rhythm and normal S1 S2, no S3 or S4  PSYCH: mentation appears normal. and affect normal/bright  Mild pain to palpation around the L4 region, right sacroiliac tenderness.  Full range of motion passive of the right hip without pain but cannot lift her leg without assistance (which patient states is chronic).  No pain to palpation of the right troch bursa.    Antalgic gait.  Doesn't like to put pressure on the right leg when she walks  Difficulty rising from the chair.    Diagnostic Test Results:  Labs reviewed in Epic  Results for orders placed or performed in visit on 07/16/19   XR Hip Right 2-3 Views    Narrative    RIGHT HIP TWO TO THREE VIEWS  7/16/2019 9:45 AM     HISTORY: Acute leg pain, right.      Impression    IMPRESSION: Right total hip arthroplasty without apparent  complication. Chronically-healed right superior/inferior pubic rami  fractures are noted. Degenerative changes and hardware are noted in  the visualized portion of the lumbar spine.    RAZ ANTONIO MD           Assessment & Plan     1. Rheumatoid arthritis involving multiple sites with positive rheumatoid factor (H)  Per rheum.  Notes reviewed   - [facility] REFERRAL; Future  - topiramate (TOPAMAX) 50 MG tablet; Take 1 tablet (50 mg) by mouth 2 times daily  Dispense: 180 tablet; Refill: 11  - oxyCODONE (ROXICODONE) 5 MG tablet; Take 1 tablet (5 mg) by mouth every 6 hours as needed for pain  Dispense: 12 tablet; Refill: 0    2. Acute leg pain, right  Appears to be musculotendinous.  I independently visualized the xray:  No fracture and hardware is intact .  Short term pain medication and then physical therapy.  If doesn't improve then needs CT to rule out an occult fracture that the xray may have missed     - [facility], [facility] REFERRAL; Future  - XR Hip Right 2-3 Views  - oxyCODONE (ROXICODONE) 5 MG tablet; Take 1 tablet (5 mg) by mouth every 6  "hours as needed for pain  Dispense: 12 tablet; Refill: 0    3. DDD (degenerative disc disease), lumbar    - [facility], [facility] CHIR[facility]RACT[facility] REFERRAL; Future    4. Obesity  Stable weight.    - topiramate (TOPAMAX) 50 MG tablet; Take 1 tablet (50 mg) by mouth 2 times daily  Dispense: 180 tablet; Refill: 11    5. Hyperlipidemia LDL goal <100  Well controlled with medications without side effects.     6. Multinodular goiter  Stable on last ultrasound.  No further ultrasound unless changes on exam     7. Encounter for therapeutic drug monitoring    - **Basic metabolic panel FUTURE anytime; Future    GERD - Per patient instructions.      BMI:   Estimated body mass index is 31.91 kg/m  as calculated from the following:    Height as of this encounter: 1.715 m (5' 7.5\").    Weight as of this encounter: 93.8 kg (206 lb 12.8 oz).   Weight management plan: Discussed healthy diet and exercise guidelines        Patient Instructions     Oxycodone and rest for a few days.  Physical therapy next week.  If no improvement, contact me and we will need to do a CT of the hip.  Get my labs done with your next lab draw for Dr. Byers.     Patient Education     Tips to Control Acid Reflux    To control acid reflux, you ll need to make some basic diet and lifestyle changes. The simple steps outlined below may be all you ll need to ease discomfort.  Watch what you eat    Avoid fatty foods and spicy foods.    Eat fewer acidic foods, such as citrus and tomato-based foods. These can increase symptoms.    Limit drinking alcohol, caffeine, and fizzy beverages. All increase acid reflux.    Try limiting chocolate, peppermint, and spearmint. These can worsen acid reflux in some people.  Watch when you eat    Avoid lying down for 3 hours after eating.    Do not snack before going to bed.  Raise your head  Raising your head and upper body by 4 to 6 inches helps limit reflux when you re lying down. Put blocks under the head of your bed frame to raise " it.  Other changes    Lose weight, if you need to    Don t exercise near bedtime    Avoid tight-fitting clothes    Limit aspirin and ibuprofen    Stop smoking   Date Last Reviewed: 7/1/2016 2000-2018 [employer]. [address] [place] [address], [place] [address]. All rights reserved. This information is not intended as a substitute for professional medical care. Always follow your healthcare professional's instructions.              Return in about 3 months (around 10/16/2019) for Medication check.    Georgia Vázquez MD  [facility]

## 2019-07-24 ENCOUNTER — THERAPY VISIT (OUTPATIENT)
Dept: PHYSICAL THERAPY | Facility: CLINIC | Age: 79
End: 2019-07-24
Attending: INTERNAL MEDICINE
Payer: COMMERCIAL

## 2019-07-24 DIAGNOSIS — M79.604 ACUTE LEG PAIN, RIGHT: ICD-10-CM

## 2019-07-24 DIAGNOSIS — M05.79 RHEUMATOID ARTHRITIS INVOLVING MULTIPLE SITES WITH POSITIVE RHEUMATOID FACTOR (H): ICD-10-CM

## 2019-07-24 DIAGNOSIS — M51.369 DDD (DEGENERATIVE DISC DISEASE), LUMBAR: ICD-10-CM

## 2019-07-24 PROCEDURE — 97112 NEUROMUSCULAR REEDUCATION: CPT | Mod: GP | Performed by: PHYSICAL THERAPIST

## 2019-07-24 PROCEDURE — 97162 PT EVAL MOD COMPLEX 30 MIN: CPT | Mod: GP | Performed by: PHYSICAL THERAPIST

## 2019-07-24 PROCEDURE — 97110 THERAPEUTIC EXERCISES: CPT | Mod: GP | Performed by: PHYSICAL THERAPIST

## 2019-07-24 ASSESSMENT — ACTIVITIES OF DAILY LIVING (ADL)
SITTING_FOR_15_MINUTES: NO DIFFICULTY AT ALL
STEPPING_UP_AND_DOWN_CURBS: MODERATE DIFFICULTY
GETTING_INTO_AND_OUT_OF_A_BATHTUB: UNABLE TO DO
HOS_ADL_HIGHEST_POTENTIAL_SCORE: 40
GETTING_INTO_AND_OUT_OF_AN_AVERAGE_CAR: EXTREME DIFFICULTY
PUTTING_ON_SOCKS_AND_SHOES: EXTREME DIFFICULTY
GOING_DOWN_1_FLIGHT_OF_STAIRS: MODERATE DIFFICULTY
WALKING_15_MINUTES_OR_GREATER: UNABLE TO DO
WALKING_INITIALLY: NO DIFFICULTY AT ALL
GOING_UP_1_FLIGHT_OF_STAIRS: EXTREME DIFFICULTY
WALKING_APPROXIMATELY_10_MINUTES: EXTREME DIFFICULTY
HOS_ADL_COUNT: 10
ROLLING_OVER_IN_BED: EXTREME DIFFICULTY
HOS_ADL_ITEM_SCORE_TOTAL: 12
DEEP_SQUATTING: UNABLE TO DO

## 2019-07-24 NOTE — LETTER
[facility]  [address]  [address]  [place] [id number] [phone]    2019    Re: Ginger Marshall   :   1940  MRN:  [id number]   REFERRING PHYSICIAN:   [facility]  Date of Initial Evaluation:  2019  Visits:     Reason for Referral:     Rheumatoid arthritis involving multiple sites with positive rheumatoid factor (H)  Acute leg pain, right  DDD (degenerative disc disease), lumbar    EVALUATION SUMMARY    [facility] Initial Evaluation  Subjective:  The history is provided by the patient.   Ginger Marshall being seen for back and hip pain.   Problem began 2019. Where condition occurred: in the community.Problem occurred: spinal injection then hip gave out. hip better now  and reported as 4/10 on pain scale. General health as reported by patient is good. Primary job tasks include:  Other. Other job/home tasks details: exertion, walking dog due to her back .  Pain is described as aching and is constant.  Since onset symptoms are unchanged. Special tests:  X-ray, MRI and bone scan. Previous treatment includes physical therapy, surgery, chiropractic and other. There was none improvement following previous treatment.   Patient is retired.   Barriers include:  Lives alone.  Red flags:  Other.  t reports last time she saw chiropractor he fxed her pelvic                   Objective:    Assessment/Plan:            Thank you for your referral.    INQUIRIES  Therapist: [facility]  [address]  [address]  [place] [address]  Phone: [phone]  Fax: [phone]

## 2019-07-24 NOTE — PROGRESS NOTES
[facility] Initial Evaluation  Subjective:  The history is provided by the patient.   Ginger Marshall being seen for back and hip pain.   Problem began 7/13/2019. Where condition occurred: in the community.Problem occurred: spinal injection then hip gave out. hip better now  and reported as 4/10 on pain scale. General health as reported by patient is good. Pertinent medical history includes:  History of fractures, implanted device, overweight, osteoarthritis, rheumatoid arthritis and sleep disorder/apnea. Other medical history details: pt reports pain at night when she lays down, pt reports significant weakneess with her RA.    Surgeries include:  Orthopedic surgery. Other surgery history details: ,back, R WALTER,Feet, every toe, R hand.     Primary job tasks include:  Other, prolonged sitting, pushing/pulling, prolonged standing and driving. Other job/home tasks details: exertion, walking dog due to her back .  Pain is described as aching and is constant.  Since onset symptoms are unchanged. Special tests:  X-ray, MRI and bone scan. Previous treatment includes physical therapy, surgery, chiropractic and other. There was none improvement following previous treatment.   Patient is retired.   Barriers include:  Lives alone.    Type of problem:  Lumbar   Condition occurred with:  Degenerative joint disease and other reason. This is a chronic condition   Problem details: Pt had an acute on chronic episode.Had spinal injection and then  R hip gave out hip .   Patient reports pain:  Central lumbar spine. Radiates to:  Other.  Symptoms are exacerbated by walking, carrying and other   Pt reports any exertion increases her pain. She is unable to walk her dog at this time just lets it out to void.  Pt reports last time she saw chiropractor he fxed her pelvic.  Pt also reports she feel like feet are in a vise                     Objective:  Standing Alignment:    Cervical/Thoracic:  Forward head and thoracic  kyphosis decreased  Shoulder/UE:  Rounded shoulders    Pelvic:  Iliac crest high R and ASIS high L          Gait:    Assistive Devices:  None        poor heel toe gait sequence with decreased arm swing         Lumbar/SI Evaluation  ROM:    AROM Lumbar:   Flexion:          Good  Ext:                    Decreased lumbar extension   Side Bend:        Left:  Decreased mobility    Right:  Decreased mobility  Rotation:           Left:     Right:   Side Glide:        Left:     Right:                                                                          General     ROS    Assessment/Plan:    Patient is a 79 year old female with lumbar complaints.    Patient has the following significant findings with corresponding treatment plan.                Diagnosis 1:  Low back and hip pain  Pain -  self management and home program  Decreased ROM/flexibility - manual therapy, therapeutic exercise, therapeutic activity and home program  Decreased strength - therapeutic exercise, therapeutic activities and home program  Decreased function - therapeutic activities  Impaired posture - neuro re-education and home program  ongoing evaluation    Therapy Evaluation Codes:   1) History comprised of:   Personal factors that impact the plan of care:      Age, Coping style, Living environment and Past/current experiences.    Comorbidity factors that impact the plan of care are:      Implanted device, Numbness/tingling, Osteoarthritis, Overweight, Pain at night/rest, Rheumatoid arthritis, Sleep disorder/apnea, Weakness and spinal surgery, multiple jt replacements .     Medications impacting care: see medication list.  2) Examination of Body Systems comprised of:   Body structures and functions that impact the plan of care:      Hip, Lumbar spine and Pelvis.   Activity limitations that impact the plan of care are:      Dressing, Stairs, Walking, Sleeping, Laying down and all transfers.  3) Clinical presentation characteristics  are:   Evolving/Changing.  4) Decision-Making    Moderate complexity using standardized patient assessment instrument and/or measureable assessment of functional outcome.  Cumulative Therapy Evaluation is: Moderate complexity.    Previous and current functional limitations:  (See Goal Flow Sheet for this information)    Short term and Long term goals: (See Goal Flow Sheet for this information)     Communication ability:  Patient appears to be able to clearly communicate and understand verbal and written communication and follow directions correctly.  Treatment Explanation - The following has been discussed with the patient:   RX ordered/plan of care  Anticipated outcomes  Possible risks and side effects  This patient would benefit from PT intervention to resume normal activities.   Rehab potential is good.    Frequency:  1 X week, once daily  Duration:  for 4 weeks  Discharge Plan:  Achieve all LTG.  Independent in home treatment program.    Please refer to the daily flowsheet for treatment today, total treatment time and time spent performing 1:1 timed codes.

## 2019-07-25 ENCOUNTER — TRANSFERRED RECORDS (OUTPATIENT)
Dept: HEALTH INFORMATION MANAGEMENT | Facility: CLINIC | Age: 79
End: 2019-07-25

## 2019-07-26 ENCOUNTER — OFFICE VISIT (OUTPATIENT)
Dept: OPHTHALMOLOGY | Facility: CLINIC | Age: 79
End: 2019-07-26
Payer: COMMERCIAL

## 2019-07-26 DIAGNOSIS — Z96.1 PSEUDOPHAKIA: ICD-10-CM

## 2019-07-26 DIAGNOSIS — H43.813 PVD (POSTERIOR VITREOUS DETACHMENT), BILATERAL: ICD-10-CM

## 2019-07-26 DIAGNOSIS — H52.4 PRESBYOPIA: ICD-10-CM

## 2019-07-26 DIAGNOSIS — H26.8 PXF (PSEUDOEXFOLIATION OF LENS CAPSULE): ICD-10-CM

## 2019-07-26 DIAGNOSIS — H18.519 CORNEA GUTTATA: ICD-10-CM

## 2019-07-26 DIAGNOSIS — Z01.01 ENCOUNTER FOR EXAMINATION OF EYES AND VISION WITH ABNORMAL FINDINGS: Primary | ICD-10-CM

## 2019-07-26 DIAGNOSIS — M05.79 RHEUMATOID ARTHRITIS INVOLVING MULTIPLE SITES WITH POSITIVE RHEUMATOID FACTOR (H): ICD-10-CM

## 2019-07-26 DIAGNOSIS — H52.223 REGULAR ASTIGMATISM OF BOTH EYES: ICD-10-CM

## 2019-07-26 PROCEDURE — 92015 DETERMINE REFRACTIVE STATE: CPT | Performed by: STUDENT IN AN ORGANIZED HEALTH CARE EDUCATION/TRAINING PROGRAM

## 2019-07-26 PROCEDURE — 92014 COMPRE OPH EXAM EST PT 1/>: CPT | Performed by: STUDENT IN AN ORGANIZED HEALTH CARE EDUCATION/TRAINING PROGRAM

## 2019-07-26 ASSESSMENT — CONF VISUAL FIELD
OD_NORMAL: 1
OS_NORMAL: 1
METHOD: COUNTING FINGERS

## 2019-07-26 ASSESSMENT — REFRACTION_WEARINGRX
OS_AXIS: 023
SPECS_TYPE: PAL
OS_ADD: +3.00
OD_AXIS: 177
OD_SPHERE: -0.50
OS_CYLINDER: +0.25
OD_ADD: +3.00
OS_SPHERE: -0.25
OD_CYLINDER: +1.00

## 2019-07-26 ASSESSMENT — CUP TO DISC RATIO
OS_RATIO: 0.35
OD_RATIO: 0.4

## 2019-07-26 ASSESSMENT — EXTERNAL EXAM - RIGHT EYE: OD_EXAM: NORMAL

## 2019-07-26 ASSESSMENT — REFRACTION_MANIFEST
OD_AXIS: 177
OS_SPHERE: -0.50
OS_ADD: +2.75
OD_SPHERE: -0.50
OS_CYLINDER: +0.50
OS_AXIS: 007
OD_ADD: +2.75
OD_CYLINDER: +1.00

## 2019-07-26 ASSESSMENT — VISUAL ACUITY
METHOD: SNELLEN - LINEAR
OS_CC: 20/20
OD_CC: 20/20
CORRECTION_TYPE: GLASSES

## 2019-07-26 ASSESSMENT — EXTERNAL EXAM - LEFT EYE: OS_EXAM: NORMAL

## 2019-07-26 ASSESSMENT — TONOMETRY
IOP_METHOD: APPLANATION
OD_IOP_MMHG: 16
OS_IOP_MMHG: 17

## 2019-07-26 ASSESSMENT — SLIT LAMP EXAM - LIDS
COMMENTS: 1+ MEIBOMIAN GLAND DYSFUNCTION
COMMENTS: 1+ MEIBOMIAN GLAND DYSFUNCTION

## 2019-07-26 NOTE — LETTER
"    7/26/2019         RE: Ginger Marshall  [address]  [facility] [address]        Dear Colleague,    Thank you for referring your patient, Ginger Marshall, to the [facility]. Please see a copy of my visit note below.     Current Eye Medications:  Artificial tears(preservative free) 2-3 times daily. Patient states just stopped taking plaquenil last month, took for just under 1 years. Patient states \"didn't seem to help\".      Subjective:  Complete eye exam. Vision is unchanged both eyes. Patient doesn't think she has seen well for years. Will not change glasses, states \"to expensive\". No eye pain or discomfort in either eye.      Objective:  See Ophthalmology Exam.      Assessment:  Ginger Marshall is a 79 year old female who presents with:     PXF (PSEUDOEXFOLIATION OF LENS CAPSULE) OD Intraocular pressure 16/17 today.      PSEUDOPHAKIA OU      Cornea guttata, ou      PVD (posterior vitreous detachment), bilateral      Rheumatoid arthritis involving multiple sites with positive rheumatoid factor (H) Off Plaquenil now. Used Plaquenil for under one year.        Plan:  Glasses prescription given - optional     Aguila San MD  [phone]               Again, thank you for allowing me to participate in the care of your patient.        Sincerely,        Aguila San MD    "

## 2019-07-26 NOTE — PROGRESS NOTES
" Current Eye Medications:  Artificial tears(preservative free) 2-3 times daily. Patient states just stopped taking plaquenil last month, took for just under 1 years. Patient states \"didn't seem to help\".      Subjective:  Complete eye exam. Vision is unchanged both eyes. Patient doesn't think she has seen well for years. Will not change glasses, states \"to expensive\". No eye pain or discomfort in either eye.      Objective:  See Ophthalmology Exam.      Assessment:  Ginger Marshall is a 79 year old female who presents with:     PXF (PSEUDOEXFOLIATION OF LENS CAPSULE) OD Intraocular pressure 16/17 today.      PSEUDOPHAKIA OU      Cornea guttata, ou      PVD (posterior vitreous detachment), bilateral      Rheumatoid arthritis involving multiple sites with positive rheumatoid factor (H) Off Plaquenil now. Used Plaquenil for under one year.        Plan:  Glasses prescription given - optional     Aguila San MD  [phone]             "

## 2019-07-31 ENCOUNTER — THERAPY VISIT (OUTPATIENT)
Dept: PHYSICAL THERAPY | Facility: CLINIC | Age: 79
End: 2019-07-31
Payer: COMMERCIAL

## 2019-07-31 DIAGNOSIS — M54.50 LOW BACK PAIN: Primary | ICD-10-CM

## 2019-07-31 PROCEDURE — 97110 THERAPEUTIC EXERCISES: CPT | Mod: GP | Performed by: PHYSICAL THERAPIST

## 2019-08-07 ENCOUNTER — THERAPY VISIT (OUTPATIENT)
Dept: PHYSICAL THERAPY | Facility: CLINIC | Age: 79
End: 2019-08-07
Payer: COMMERCIAL

## 2019-08-07 DIAGNOSIS — M79.604 ACUTE LEG PAIN, RIGHT: Primary | ICD-10-CM

## 2019-08-07 DIAGNOSIS — M54.50 LOW BACK PAIN: ICD-10-CM

## 2019-08-07 PROCEDURE — 97140 MANUAL THERAPY 1/> REGIONS: CPT | Mod: GP | Performed by: PHYSICAL THERAPIST

## 2019-08-07 PROCEDURE — 97112 NEUROMUSCULAR REEDUCATION: CPT | Mod: GP | Performed by: PHYSICAL THERAPIST

## 2019-08-07 PROCEDURE — 97110 THERAPEUTIC EXERCISES: CPT | Mod: GP | Performed by: PHYSICAL THERAPIST

## 2019-08-14 ENCOUNTER — THERAPY VISIT (OUTPATIENT)
Dept: PHYSICAL THERAPY | Facility: CLINIC | Age: 79
End: 2019-08-14
Payer: COMMERCIAL

## 2019-08-14 DIAGNOSIS — M54.50 LOW BACK PAIN: Primary | ICD-10-CM

## 2019-08-14 PROCEDURE — 97140 MANUAL THERAPY 1/> REGIONS: CPT | Mod: GP | Performed by: PHYSICAL THERAPIST

## 2019-08-14 PROCEDURE — 97112 NEUROMUSCULAR REEDUCATION: CPT | Mod: GP | Performed by: PHYSICAL THERAPIST

## 2019-08-14 PROCEDURE — 97110 THERAPEUTIC EXERCISES: CPT | Mod: GP | Performed by: PHYSICAL THERAPIST

## 2019-08-14 ASSESSMENT — ENCOUNTER SYMPTOMS: BACK PAIN: 1

## 2019-08-14 NOTE — PROGRESS NOTES
Subjective    The history is provided by the patient.                     Pt reports feeling a little better. L hip is still sore Points to SI and piriformis region. Still cannot sleep on L  side due ot hip pain@. She has been sleeping on her back for many years now. Does not want to get her Hip replaced for a long time  Objective:  Standing Alignment:          Pelvis deviations alignment: anteriior tilting pelvis           Gait:    Gait Type:  Antalgic     Deviations:  Hip:  Glut max gait with lurch L and glut max gait with lurch R               Lumbar/SI Evaluation                      SI joint/Sacrum:            Sacral conclusion left:  Locked                                               General     Review of Systems   Musculoskeletal: Positive for back pain and joint pain.       Assessment/Plan:    DISCHARGE REPORT    Progress reporting period is from 7/24/19 to 8/14/19.       SUBJECTIVE  Subjective changes noted by patient:  Transfers better.       Current pain level is 2/10  . Varies one day ot the next    Previous pain level was  2/10 Initial Pain level: (P) 5/10.   Changes in function:  None and pt's activity and exercising is limited by the severe, painful muscle cramps she gets with exercising  Adverse reaction to treatment or activity: activity - musce cramps limits he activity    OBJECTIVE  Changes noted in objective findings:  Yes, transfers better        ASSESSMENT/PLAN limited progress to date.  Patient is to be discharged from physical therapy at this time due to planned surgical procedures on her wrist and fingers she will not be able to participate in physical therapy during rehab at this time y is       Recommendations:  Patient would benefit from the following:  Cont PT for her back and hip pain Once cleared after her wrist and hand surgery and able to use her hand to participate in Physical Therapy         Please refer to the daily flowsheet for treatment today, total treatment time and time  spent performing 1:1 timed codes.

## 2019-08-15 ENCOUNTER — OFFICE VISIT (OUTPATIENT)
Dept: FAMILY MEDICINE | Facility: CLINIC | Age: 79
End: 2019-08-15
Payer: COMMERCIAL

## 2019-08-15 VITALS
DIASTOLIC BLOOD PRESSURE: 68 MMHG | WEIGHT: 206.5 LBS | RESPIRATION RATE: 18 BRPM | TEMPERATURE: 96.8 F | BODY MASS INDEX: 31.3 KG/M2 | SYSTOLIC BLOOD PRESSURE: 130 MMHG | HEIGHT: 68 IN | OXYGEN SATURATION: 97 % | HEART RATE: 96 BPM

## 2019-08-15 DIAGNOSIS — M70.21 OLECRANON BURSITIS OF RIGHT ELBOW: ICD-10-CM

## 2019-08-15 DIAGNOSIS — M05.79 RHEUMATOID ARTHRITIS INVOLVING MULTIPLE SITES WITH POSITIVE RHEUMATOID FACTOR (H): ICD-10-CM

## 2019-08-15 DIAGNOSIS — Z01.818 PREOP GENERAL PHYSICAL EXAM: Primary | ICD-10-CM

## 2019-08-15 DIAGNOSIS — R21 RASH: ICD-10-CM

## 2019-08-15 DIAGNOSIS — D50.8 IRON DEFICIENCY ANEMIA REFRACTORY TO IRON THERAPY: ICD-10-CM

## 2019-08-15 DIAGNOSIS — Z79.899 HIGH RISK MEDICATION USE: ICD-10-CM

## 2019-08-15 LAB
ANION GAP SERPL CALCULATED.3IONS-SCNC: 7 MMOL/L (ref 3–14)
BUN SERPL-MCNC: 16 MG/DL (ref 7–30)
CALCIUM SERPL-MCNC: 9.6 MG/DL (ref 8.5–10.1)
CHLORIDE SERPL-SCNC: 112 MMOL/L (ref 94–109)
CO2 SERPL-SCNC: 25 MMOL/L (ref 20–32)
CREAT SERPL-MCNC: 0.57 MG/DL (ref 0.52–1.04)
GFR SERPL CREATININE-BSD FRML MDRD: 89 ML/MIN/{1.73_M2}
GLUCOSE SERPL-MCNC: 95 MG/DL (ref 70–99)
HGB BLD-MCNC: 11.7 G/DL (ref 11.7–15.7)
POTASSIUM SERPL-SCNC: 3.5 MMOL/L (ref 3.4–5.3)
SODIUM SERPL-SCNC: 144 MMOL/L (ref 133–144)

## 2019-08-15 PROCEDURE — 93000 ELECTROCARDIOGRAM COMPLETE: CPT | Performed by: NURSE PRACTITIONER

## 2019-08-15 PROCEDURE — 80048 BASIC METABOLIC PNL TOTAL CA: CPT | Performed by: NURSE PRACTITIONER

## 2019-08-15 PROCEDURE — 99215 OFFICE O/P EST HI 40 MIN: CPT | Performed by: NURSE PRACTITIONER

## 2019-08-15 PROCEDURE — 85018 HEMOGLOBIN: CPT | Performed by: NURSE PRACTITIONER

## 2019-08-15 PROCEDURE — 36415 COLL VENOUS BLD VENIPUNCTURE: CPT | Performed by: NURSE PRACTITIONER

## 2019-08-15 RX ORDER — OXYCODONE HYDROCHLORIDE 5 MG/1
5 TABLET ORAL EVERY 6 HOURS PRN
Qty: 12 TABLET | Refills: 0 | Status: SHIPPED | OUTPATIENT
Start: 2019-08-15 | End: 2019-10-16

## 2019-08-15 RX ORDER — TRIAMCINOLONE ACETONIDE 1 MG/G
CREAM TOPICAL 2 TIMES DAILY PRN
Qty: 30 G | Refills: 1 | Status: SHIPPED | OUTPATIENT
Start: 2019-08-15 | End: 2021-03-18

## 2019-08-15 ASSESSMENT — MIFFLIN-ST. JEOR: SCORE: 1462.24

## 2019-08-15 ASSESSMENT — PAIN SCALES - GENERAL: PAINLEVEL: MODERATE PAIN (5)

## 2019-08-15 NOTE — RESULT ENCOUNTER NOTE
Dear Ginger,    Your recent test results are attached.      No anemia.    If you have any questions please feel free to contact [phone] or myself via [employer].    Sincerely,  Dahiana Flores, CNP

## 2019-08-15 NOTE — PROGRESS NOTES
[facility]  [address] [place] [address]  [place] [address]  [phone]  Dept: [phone]    PRE-OP EVALUATION:  Today's date: 8/15/2019    Ginger Marshall (: 1940) presents for pre-operative evaluation assessment as requested by Dr. Redmond.  She requires evaluation and anesthesia risk assessment prior to undergoing surgery/procedure for treatment of Distal Ulna resection .    Proposed Surgery/ Procedure:  Distal Ulna resection  Right hand  Date of Surgery/ Procedure:   Time of Surgery/ Procedure: 12:00  Hospital/Surgical Facility: [facility] (phone number - [phone])  Fax number for surgical facility:   Primary Physician: Georgia Vázquez  Type of Anesthesia Anticipated: General    Patient has a Health Care Directive or Living Will:  YES     1. NO - Do you have a history of heart attack, stroke, stent, bypass or surgery on an artery in the head, neck, heart or legs?  2. NO - Do you ever have any pain or discomfort in your chest?  3. NO - Do you have a history of  Heart Failure?  4. NO - Are you troubled by shortness of breath when: walking on the level, up a slight hill or at night?  5. NO - Do you currently have a cold, bronchitis or other respiratory infection?  6. NO - Do you have a cough, shortness of breath or wheezing?  7. NO - Do you sometimes get pains in the calves of your legs when you walk?  8. NO - Do you or anyone in your family have previous history of blood clots?  9. NO - Do you or does anyone in your family have a serious bleeding problem such as prolonged bleeding following surgeries or cuts?  10. YES - HAVE YOU EVER HAD PROBLEMS WITH ANEMIA OR BEEN TOLD TO TAKE IRON PILLS? Used to take iron pills due to iron deficiency anemia.  No longer anemic.  11. NO - Have you had any abnormal blood loss such as black, tarry or bloody stools, or abnormal vaginal bleeding?  12. YES - HAVE YOU EVER HAD A BLOOD TRANSFUSION? 6 years ago with hip replacement  13. YES - HAVE  YOU OR ANY OF YOUR RELATIVES EVER HAD PROBLEMS WITH ANESTHESIA? Sister had psychosis with anesthesia  14. YES - DO YOU HAVE SLEEP APNEA, EXCESSIVE SNORING OR DAYTIME DROWSINESS? Has sleep apnea, does not use CPAP.  15. NO - Do you have any prosthetic heart valves?  16. YES - DO YOU HAVE PROSTHETIC JOINTS? Both knees and right hip  17. NO - Is there any chance that you may be pregnant?    Dahiana Flores CNP     HPI:     HPI related to upcoming procedure: Patient will undergo right wrist surgery to improve pain.      ANEMIA - Patient has a recent history of moderate-severe anemia, which has not been symptomatic. Work up to date has revealed iron deficiency. Treatment has been iron.     HYPERLIPIDEMIA - Patient has a long history of significant Hyperlipidemia requiring medication for treatment with recent good control. Patient reports no problems or side effects with the medication.       MEDICAL HISTORY:     Patient Active Problem List    Diagnosis Date Noted     DDD (degenerative disc disease), lumbar 07/16/2019     Priority: Medium     Low iron 06/22/2018     Priority: Medium     BMI 28.0-28.9,adult 06/22/2018     Priority: Medium     History of depression 06/22/2018     Priority: Medium     Anxiety 06/20/2017     Priority: Medium     S/P lumbar fusion 04/03/2017     Priority: Medium     Allergic state, subsequent encounter 03/01/2017     Priority: Medium     BMI 32.0-32.9,adult 03/01/2017     Priority: Medium     Spinal stenosis of lumbar region without neurogenic claudication 03/01/2017     Priority: Medium     Herniated nucleus pulposus, L3-4 03/01/2017     Priority: Medium     Chronic bilateral low back pain without sciatica 07/17/2016     Priority: Medium     Hyperglycemia 04/29/2016     Priority: Medium     Iron malabsorption 04/27/2016     Priority: Medium     Personal history of healed osteoporosis fracture 01/26/2016     Priority: Medium     MGD (meibomian gland dysfunction) 01/19/2016     Priority:  Medium     Blepharitis of both eyes 01/19/2016     Priority: Medium     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 01/04/2016     Priority: Medium     Obesity 08/28/2015     Priority: Medium     Other chronic pain 08/04/2015     Priority: Medium     Patient is followed by [facility] Unavailable for ongoing prescription of pain medication.  All refills should be approved by this provider, or covering partner.    Medication(s):. Oxycodone 5 mg   Maximum quantity per month: 60  Clinic visit frequency required: Q 3 months     Controlled substance agreement on file: Yes       Date(s): 5/2014    Pain Clinic evaluation in the past: Yes       Date/Location:  7/8/15 [facility] Total Score(s):  No flowsheet data found.    Last [facility] website verification:    [email or web address]        Stenosis, spinal, lumbar 07/07/2015     Priority: Medium     Cornea guttata, ou 05/28/2015     Priority: Medium     Conjunctival concretions 05/28/2015     Priority: Medium     Migraine 04/27/2014     Priority: Medium     SHERRY (obstructive sleep apnea)-severe (AHI 35) 01/24/2014     Priority: Medium     Polysomnography 1/20/2014: (214.0 lbs). The lowest oxygen saturation was 88.0%. Apnea/Hypopnea Index was 35.4 events per hour.  The REM AHI was N/A.  The RERA index was 19.7 per hour.   The RDI was 55.1. On CPAP optimal pressure was 9 with an AHI of 0.5 including lateral REM sleep. During the diagnostic portion of the study, PLM index was 99.0 per hour.  During the treatment portion of the study, PLM index was 46.6 per hour.        Neuropathy 07/24/2013     Priority: Medium     Diagnosed based on symptoms in July 24, 2013  Increased cymbalta to 60 mg daily.  No emg done to date       Anemia of chronic disease 05/17/2013     Priority: Medium     zEncounter for counseling 12/07/2012     Priority: Medium     Overview:   Patient has identified Health Care Agent(s): Yes  Add Health Care Agents: Yes    Health Care Agent(s):  Primary  Health Care Agent: Jamlia Relationship: daughter Phone: [phone]   Secondary Health Care Agent:  Relationship:  Phone:    Conservator:  Relationship:  Phone:    Guardian: Relationship:  Phone:      Patient has Advance Care Plan Documents (Health Care Directive, POLST): Yes    Advance Care Plan Documents:  Health Care Directive    Patient has identified Specific Treatment Preferences: Yes   Specific Treatment Preferences: a.) Code Status:  CPR/Attempt Resuscitation        Advance Care Planning 05/15/2012     Priority: Medium     Advance Care Planning 7/7/2016: Receipt of ACP document:  Received: POLST which was signed and dated by provider on 4-12-16.  Document previously scanned on 4-18-16.  Has a previous POLST dated 9-30-16 and a previous Resuscitation Guidelines dated 4-7-16. Orders reviewed and found to be valid.  Code Status needs to be updated to reflect choices in most recent ACP document. Orders are DNI only.  Confirmed/documented designated decision maker(s).  Added by Bailey Morales RN Advance Care Planning Liaison with [employer]  Advance Care Planning 7/7/2016: Receipt of ACP document:  Received: Health Care Directive which was witnessed or notarized on 4-25-16.  Document previously scanned on 5-20-16.  Validation form completed and sent to be scanned.  Code Status needs to be updated to reflect choices in most recent ACP document.  Confirmed/documented designated decision maker(s).  Added by Bailey Morales  Advance Care Planning 5/15/2012: Patient states has Advance Directive and will bring in a copy to clinic.            Hyperlipidemia LDL goal <100 07/23/2011     Priority: Medium     GERD (gastroesophageal reflux disease) 04/12/2011     Priority: Medium     nexium worked but insurance would not pay for it.  Was changed to omeprazole and has breakthrough reflux on 40 mg daily.       Psychophysiologic insomnia 01/04/2011     Priority: Medium     ambien use ok       Pulmonary nodule  01/04/2011     Priority: Medium     Ct needed in 10/11       Multinodular goiter      Priority: Medium     Ultrasound in 10/10       Mitral Regurgitation 10/01/2010     Priority: Medium     History of colonic polyps      Priority: Medium     tubular adenoma  Problem list name updated by automated process. Provider to review       RA (Rheumatoid Arthritis) off Plaquenil 06/01/2009     Priority: Medium     Patient is followed by [facility] for ongoing prescription of narcotic pain medicine.  Med: oxycodone.   Maximum use per month: 90  Expected duration: lifelong  Narcotic agreement on file: YES  Clinic visit recommended: Q 3 months         Osteoporosis 02/27/2008     Priority: Medium     PVD (POSTERIOR VITREOUS DETACHMENT) OU 01/12/2011     Priority: Low     PXF (PSEUDOEXFOLIATION OF LENS CAPSULE) OD 01/12/2011     Priority: Low     PSEUDOPHAKIA OU 01/07/2011     Priority: Low     CARDIOVASCULAR SCREENING; LDL GOAL LESS THAN 130 10/31/2010     Priority: Low     Menopause      Priority: Low      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     History of total knee replacement 7/23/2009     Menopause late 40's     Other chronic pain     joints     Pelvic fracture (H) 5/13/2014     PUD (peptic ulcer disease)      Rheumatoid arteritis      Sleep apnea     Uses a CPAP     Vitamin B12 deficiency      Past Surgical History:   Procedure Laterality Date     ABDOMEN SURGERY      c-sections     ARTHROSCOPY KNEE RT/LT  01/06    left     BACK SURGERY  2013    disc     BREAST BIOPSY, RT/LT      left benign     BREAST SURGERY  90's    lumpectomy     C ANESTH,TOTAL HIP ARTHROPLASTY  2010    Rt hip     C HAND/FINGER SURGERY UNLISTED  11/05    right hand     C NONSPECIFIC PROCEDURE  91    left foot surgery     C NONSPECIFIC PROCEDURE  95    R MCP surgery     C TOTAL KNEE ARTHROPLASTY  2006     left     C/SECTION, LOW TRANSVERSE  66,72    x 2     CATARACT IOL, RT/LT       COLONOSCOPY  2006,2009     EYE SURGERY      cataracs     HC ESOPH/GAS REFLUX TEST W NASAL IMPED >1 HR  2/1/2012    Procedure:ESOPHAGEAL IMPEDENCE FUNCTION TEST WITH 24 HOUR PH GREATER THAN 1 HOUR; Surgeon:KAYKAY JULIO; Location:[facility]     IR CAUDAL EPIDURAL INJECTION SINGLE  5/2/2012    LESI L5-S1 at [facility]     OPTICAL TRACKING SYSTEM FUSION POSTERIOR SPINE LUMBAR N/A 4/3/2017    Procedure: OPTICAL TRACKING SYSTEM FUSION SPINE POSTERIOR LUMBAR ONE LEVEL;  Surgeon: Anthony Michele MD;  Location: [facility]     SURGICAL HISTORY OF -   2007    right knee total replacement     Current Outpatient Medications   Medication Sig Dispense Refill     acetaminophen (TYLENOL) 500 MG tablet Take 2 tablets (1,000 mg) by mouth every 8 hours as needed for pain 100 tablet 0     atorvastatin (LIPITOR) 40 MG tablet Take 1 tablet (40 mg) by mouth daily 90 tablet 2     Cholecalciferol (VITAMIN D-3 PO) Take 1,000 Units by mouth 2 times daily       FOLIC ACID PO Take 800 mcg by mouth daily       ibuprofen (ADVIL) 200 MG tablet Take 200 mg by mouth every 4 hours as needed for mild pain       ipratropium (ATROVENT) 0.06 % nasal spray Spray 2 sprays into both nostrils 4 times daily as needed for rhinitis 1 Box 3     Methotrexate, PF, (RASUVO) 25 MG/0.5ML autoinjector Inject 0.5 mLs (25 mg) Subcutaneous every 7 days . Hold for signs of infection, and seek medical attention. 2 mL 6     multivitamin, therapeutic with minerals (MULTI-VITAMIN) TABS Take 1 tablet by mouth daily       naproxen sodium (ANAPROX) 220 MG tablet Take 220 mg by mouth 2 times daily (with meals)       oxyCODONE (ROXICODONE) 5 MG tablet Take 1 tablet (5 mg) by mouth every 6 hours as needed for pain 12 tablet 0     pantoprazole (PROTONIX) 40 MG EC tablet TAKE ONE TABLET BY MOUTH EVERY DAY 30-60 MINUTES BEFORE A MEAL 90 tablet 2     Pseudoephedrine-Guaifenesin (MUCINEX D PO)         "tofacitinib (XELJANZ XR) 11 MG 24 hr tablet Take 1 tablet (11 mg) by mouth daily 90 tablet 3     topiramate (TOPAMAX) 50 MG tablet Take 1 tablet (50 mg) by mouth 2 times daily 180 tablet 11     triamcinolone (KENALOG) 0.1 % external cream Apply topically 2 times daily as needed for irritation 30 g 1     OTC products: None, except as noted above    Allergies   Allergen Reactions     Abatacept Other (See Comments)     Severe headaches  Migraine     Celebrex [Celecoxib]      Ineffective     Celecoxib Unknown and Nausea     Ethanol      Antihistamines     Orencia [Abatacept]      Headache     Septra [Bactrim]      Sulfa Drugs Nausea and Vomiting     \"deathly ill\"  Allergic to everything with Sulfa in it.     Sulfamethoxazole-Trimethoprim Nausea     Tramadol Other (See Comments)     Headache  Migraine headache     Valdecoxib Unknown     Made me \"very very ill\", might of been \"cramping\"     Adhesive Tape Rash     Plastic tape  Plastic tapes     Antihistamines, Chlorpheniramine-Type  [Alkylamines] Anxiety and Other (See Comments)      Latex Allergy: NO    Social History     Tobacco Use     Smoking status: Former Smoker     Packs/day: 1.00     Years: 41.00     Pack years: 41.00     Types: Cigarettes     Last attempt to quit: 1999     Years since quittin.6     Smokeless tobacco: Never Used     Tobacco comment: former smoker   Substance Use Topics     Alcohol use: Yes     Alcohol/week: 0.0 oz     Comment: Periodically.     History   Drug Use No       REVIEW OF SYSTEMS:   Constitutional, neuro, ENT, endocrine, pulmonary, cardiac, gastrointestinal, genitourinary, musculoskeletal, integument and psychiatric systems are negative, except as otherwise noted.    EXAM:   /68   Pulse 96   Temp 96.8  F (36  C) (Oral)   Resp 18   Ht 1.715 m (5' 7.5\")   Wt 93.7 kg (206 lb 8 oz)   SpO2 97%   BMI 31.87 kg/m      GENERAL APPEARANCE: healthy, alert and no distress     EYES: EOMI, PERRL     HENT: ear canals and TM's " normal and nose and mouth without ulcers or lesions     NECK: no adenopathy, no asymmetry, masses, or scars and thyroid normal to palpation     RESP: lungs clear to auscultation - no rales, rhonchi or wheezes     CV: regular rates and rhythm, normal S1 S2, no S3 or S4 and no murmur, click or rub     ABDOMEN:  soft, nontender, no HSM or masses and bowel sounds normal     MS: extremities normal- no gross deformities noted and deformity noted to distal ulna right wrist; edema noted to right olecranon bursa     SKIN: no suspicious lesions or rashes     NEURO: Normal strength and tone, sensory exam grossly normal, mentation intact and speech normal     PSYCH: mentation appears normal. and affect normal/bright     LYMPHATICS: No cervical adenopathy    DIAGNOSTICS:     EKG: appears normal, NSR, normal axis, normal intervals, no acute ST/T changes c/w ischemia, no LVH by voltage criteria, unchanged from previous tracings  Labs Resulted Today:   Results for orders placed or performed in visit on 08/15/19   Hemoglobin   Result Value Ref Range    Hemoglobin 11.7 11.7 - 15.7 g/dL   Basic metabolic panel   Result Value Ref Range    Sodium 144 133 - 144 mmol/L    Potassium 3.5 3.4 - 5.3 mmol/L    Chloride 112 (H) 94 - 109 mmol/L    Carbon Dioxide 25 20 - 32 mmol/L    Anion Gap 7 3 - 14 mmol/L    Glucose 95 70 - 99 mg/dL    Urea Nitrogen 16 7 - 30 mg/dL    Creatinine 0.57 0.52 - 1.04 mg/dL    GFR Estimate 89 >60 mL/min/[1.73_m2]    GFR Estimate If Black >90 >60 mL/min/[1.73_m2]    Calcium 9.6 8.5 - 10.1 mg/dL       Recent Labs   Lab Test 06/12/19  0956 05/08/19  1012  11/23/18  1309  03/27/17  1142  10/28/16  1238  04/29/16  1137  09/28/12  1455   HGB 12.0 12.1   < > 12.3   < > 12.7   < >  --    < > 12.4   < > 10.3*    338   < > 361   < > 403   < >  --    < > 438   < > 515*   INR  --   --   --   --   --  1.08  --   --   --   --   --  1.00   NA  --   --   --  141  --  139   < >  --   --  138   < > 138   POTASSIUM  --   --    --  3.9  --  4.0   < >  --   --  4.3   < > 4.4   CR 0.64 0.61   < > 0.59   < > 0.62   < >  --    < > 0.61   < > 0.63   A1C  --   --   --   --   --   --   --  5.5  --  6.2*  --   --     < > = values in this interval not displayed.        IMPRESSION:   Reason for surgery/procedure: Right ulnar pain  Diagnosis/reason for consult: Management of comorbid conditions and preoperative exam.      The proposed surgical procedure is considered INTERMEDIATE risk.    REVISED CARDIAC RISK INDEX  The patient has the following serious cardiovascular risks for perioperative complications such as (MI, PE, VFib and 3  AV Block):  No serious cardiac risks  INTERPRETATION: 0 risks: Class I (very low risk - 0.4% complication rate)    The patient has the following additional risks for perioperative complications:  No identified additional risks      ICD-10-CM    1. Preop general physical exam Z01.818 EKG 12-lead complete w/read - Clinics   2. 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 D50.8 Hemoglobin   3. High risk medication use Z79.899 Basic metabolic panel   4. Rheumatoid arthritis involving multiple sites with positive rheumatoid factor (H) M05.79 oxyCODONE (ROXICODONE) 5 MG tablet   5. Rash R21 triamcinolone (KENALOG) 0.1 % external cream   6. Olecranon bursitis of right elbow M70.21      Patient given short course of oxycodone to use for pain while off xeljanz.  Patient to wrap right elbow and avoid leaning on elbow.  I advised follow-up for fever or if not improving.    RECOMMENDATIONS:       Cardiovascular Risk  Performs 4 METs exercise without symptoms (Light housework (dusting, washing dishes)) .       Anemia  Anemia and does not require treatment prior to surgery.  Monitor Hemoglobin postoperatively.      --Patient is to take all scheduled medications on the day of surgery EXCEPT for modifications listed below.    Anticoagulant or Antiplatelet Medication Use  Stop NSAIDS 5 days prior to procedure.        Preoperative  Joint replacement and Rheumatologic DMARD Use  The pending procedure is an intervertebral disc arthroplasty or a total joint arthroplasty at the hip (WALTER), knee (TKA), ankle shoulder, wrist or elbow.    --Methotrexate: Continue in the perioperative period.  --Tofacitinib: STOP 7 days before and 7 days after the procedure.      APPROVAL GIVEN to proceed with proposed procedure, without further diagnostic evaluation       Signed Electronically by: AKIRA Jones CNP    Copy of this evaluation report is provided to requesting physician.    [facility] Preop Guidelines    Revised Cardiac Risk Index

## 2019-08-15 NOTE — PATIENT INSTRUCTIONS
Stop aspirin, ibuprofen, aleve, fish oil 5-7 days before surgery.   Hold xeljanz starting now and do not resume until 7 days after surgery.  Okay to take tylenol for pain.  On the morning of surgery okay to take topamax, protonix with small sips of water.    Before Your Surgery      Call your surgeon if there is any change in your health. This includes signs of a cold or flu (such as a sore throat, runny nose, cough, rash or fever).    Do not smoke, drink alcohol or take over the counter medicine (unless your surgeon or primary care doctor tells you to) for the 24 hours before and after surgery.    If you take prescribed drugs: Follow your doctor s orders about which medicines to take and which to stop until after surgery.    Eating and drinking prior to surgery: follow the instructions from your surgeon    Take a shower or bath the night before surgery. Use the soap your surgeon gave you to gently clean your skin. If you do not have soap from your surgeon, use your regular soap. Do not shave or scrub the surgery site.  Wear clean pajamas and have clean sheets on your bed.   Patient Education     Bursitis of the Elbow (Olecranon)  Your elbow joint contains a small fluid-filled sac called a bursa. The bursa helps the muscles and tendons move smoothly over the bone. It also cushions and protects your elbow. Bursitis is when the bursa is inflamed or swollen. This is most often due to overuse of or injury to the elbow. Symptoms include swelling and pain. If the elbow is red and feels warm to the touch, the bursa itself may be infected.  In most cases, elbow bursitis resolves with medicine and self-care at home. It may take several weeks for the bursa to heal and the swelling to go away. In some cases, your healthcare provider may drain excess fluid from the bursa. Or, he or she may inject medicine directly into the bursa to help relieve symptoms. In severe cases, you may need surgery to remove the bursa may. If there is  concern that the bursa is infected, your healthcare provider may prescribe antibiotics to treat the infection.    Home care  Your healthcare provider may prescribe medicine to help relieve pain and swelling. This may be an over-the-counter pain reliever or prescription pain medicine. Take all medicines as directed. To help treat or prevent infection, your provider may prescribe antibiotics. If these are prescribed, take them as directed until they are gone.  The following are general care guidelines:  Apply an ice pack or bag of frozen peas wrapped in a thin towel to your elbow for 15 to 20 minutes at a time. Do this 3 to 4 times a day until pain and swelling improve.  Keep your elbow raised above the level of your heart whenever possible. This helps reduce swelling. When sitting or lying down, place your arm on a pillow that rests on your chest or on a pillow at your side.  Use an elastic wrap around the elbow joint to compress the area while it is healing. Make the wrap snug but not tight to the point of causing pain.  Rest your elbow to give it time to heal. You may need to wear an elbow pad to help protect and limit the movement of your elbow. During and after healing, avoid leaning on your elbows.  Follow-up care  Follow up with your healthcare provider, or as advised. If you have been referred to a specialist, make that appointment promptly.  When to seek medical advice  Call your healthcare provider right away if any of these occur:  Fever of 100.4 F (38 C) or higher, or as advised  Chills  Increased pain, swelling, warmth, redness, or drainage from the joint  Trouble moving the elbow joint  Numbness or tingling in the hand  Severe pain or swelling in forearm or hand  Loss of pink color and slow return of color after squeezing fingertip or hand  Date Last Reviewed: 6/1/2016 2000-2018 [employer]. [address] [place] [address], [place] [address]. All rights reserved. This information is not intended  as a substitute for professional medical care. Always follow your healthcare professional's instructions.

## 2019-08-16 NOTE — RESULT ENCOUNTER NOTE
Dear Ginger,    Your recent test results are attached.      Normal kidney function and electrolytes.      If you have any questions please feel free to contact [phone] or myself via [employer].    Sincerely,  Dahiana Flores, CNP

## 2019-08-20 ENCOUNTER — SURGERY - HEALTHEAST (OUTPATIENT)
Dept: SURGERY | Facility: CLINIC | Age: 79
End: 2019-08-20

## 2019-08-20 ENCOUNTER — ANESTHESIA - HEALTHEAST (OUTPATIENT)
Dept: SURGERY | Facility: CLINIC | Age: 79
End: 2019-08-20

## 2019-08-20 ASSESSMENT — MIFFLIN-ST. JEOR: SCORE: 1415.73

## 2019-09-12 DIAGNOSIS — M05.79 RHEUMATOID ARTHRITIS INVOLVING MULTIPLE SITES WITH POSITIVE RHEUMATOID FACTOR (H): Chronic | ICD-10-CM

## 2019-09-12 LAB
ALBUMIN SERPL-MCNC: 3.8 G/DL (ref 3.4–5)
ALP SERPL-CCNC: 45 U/L (ref 40–150)
ALT SERPL W P-5'-P-CCNC: 25 U/L (ref 0–50)
AST SERPL W P-5'-P-CCNC: 21 U/L (ref 0–45)
BASOPHILS # BLD AUTO: 0 10E9/L (ref 0–0.2)
BASOPHILS NFR BLD AUTO: 0.2 %
BILIRUB DIRECT SERPL-MCNC: <0.1 MG/DL (ref 0–0.2)
BILIRUB SERPL-MCNC: 0.3 MG/DL (ref 0.2–1.3)
CREAT SERPL-MCNC: 0.55 MG/DL (ref 0.52–1.04)
CRP SERPL-MCNC: <2.9 MG/L (ref 0–8)
DIFFERENTIAL METHOD BLD: ABNORMAL
EOSINOPHIL # BLD AUTO: 0.3 10E9/L (ref 0–0.7)
EOSINOPHIL NFR BLD AUTO: 6.1 %
ERYTHROCYTE [DISTWIDTH] IN BLOOD BY AUTOMATED COUNT: 14.2 % (ref 10–15)
ERYTHROCYTE [SEDIMENTATION RATE] IN BLOOD BY WESTERGREN METHOD: 34 MM/H (ref 0–30)
GFR SERPL CREATININE-BSD FRML MDRD: >90 ML/MIN/{1.73_M2}
HCT VFR BLD AUTO: 36.7 % (ref 35–47)
HGB BLD-MCNC: 11.6 G/DL (ref 11.7–15.7)
LYMPHOCYTES # BLD AUTO: 1.2 10E9/L (ref 0.8–5.3)
LYMPHOCYTES NFR BLD AUTO: 25.1 %
MCH RBC QN AUTO: 30.3 PG (ref 26.5–33)
MCHC RBC AUTO-ENTMCNC: 31.6 G/DL (ref 31.5–36.5)
MCV RBC AUTO: 96 FL (ref 78–100)
MONOCYTES # BLD AUTO: 0.6 10E9/L (ref 0–1.3)
MONOCYTES NFR BLD AUTO: 11.5 %
NEUTROPHILS # BLD AUTO: 2.7 10E9/L (ref 1.6–8.3)
NEUTROPHILS NFR BLD AUTO: 57.1 %
PLATELET # BLD AUTO: 348 10E9/L (ref 150–450)
PROT SERPL-MCNC: 7.5 G/DL (ref 6.8–8.8)
RBC # BLD AUTO: 3.83 10E12/L (ref 3.8–5.2)
WBC # BLD AUTO: 4.8 10E9/L (ref 4–11)

## 2019-09-12 PROCEDURE — 85652 RBC SED RATE AUTOMATED: CPT | Performed by: INTERNAL MEDICINE

## 2019-09-12 PROCEDURE — 82565 ASSAY OF CREATININE: CPT | Performed by: INTERNAL MEDICINE

## 2019-09-12 PROCEDURE — 85025 COMPLETE CBC W/AUTO DIFF WBC: CPT | Performed by: INTERNAL MEDICINE

## 2019-09-12 PROCEDURE — 86140 C-REACTIVE PROTEIN: CPT | Performed by: INTERNAL MEDICINE

## 2019-09-12 PROCEDURE — 36415 COLL VENOUS BLD VENIPUNCTURE: CPT | Performed by: INTERNAL MEDICINE

## 2019-09-12 PROCEDURE — 80076 HEPATIC FUNCTION PANEL: CPT | Performed by: INTERNAL MEDICINE

## 2019-09-19 DIAGNOSIS — E78.5 HYPERLIPIDEMIA LDL GOAL <100: Chronic | ICD-10-CM

## 2019-09-19 RX ORDER — ATORVASTATIN CALCIUM 40 MG/1
TABLET, FILM COATED ORAL
Qty: 90 TABLET | Refills: 2 | Status: SHIPPED | OUTPATIENT
Start: 2019-09-19 | End: 2020-05-31

## 2019-09-19 NOTE — TELEPHONE ENCOUNTER
Pending Prescriptions:                       Disp   Refills    atorvastatin (LIPITOR) 40 MG tablet [Phar*90 tab*2            Sig: TAKE ONE TABLET BY MOUTH EVERY DAY    Routing refill request to provider for review/approval because:  A break in medication (9 month supply sent 1.5 years ago).    hCloé Oliver RN

## 2019-09-28 ENCOUNTER — HEALTH MAINTENANCE LETTER (OUTPATIENT)
Age: 79
End: 2019-09-28

## 2019-10-16 ENCOUNTER — OFFICE VISIT (OUTPATIENT)
Dept: INTERNAL MEDICINE | Facility: CLINIC | Age: 79
End: 2019-10-16
Payer: COMMERCIAL

## 2019-10-16 VITALS
RESPIRATION RATE: 18 BRPM | BODY MASS INDEX: 30.71 KG/M2 | HEIGHT: 68 IN | HEART RATE: 89 BPM | OXYGEN SATURATION: 97 % | DIASTOLIC BLOOD PRESSURE: 70 MMHG | WEIGHT: 202.6 LBS | SYSTOLIC BLOOD PRESSURE: 118 MMHG | TEMPERATURE: 97.4 F

## 2019-10-16 DIAGNOSIS — M05.79 RHEUMATOID ARTHRITIS INVOLVING MULTIPLE SITES WITH POSITIVE RHEUMATOID FACTOR (H): ICD-10-CM

## 2019-10-16 DIAGNOSIS — M25.531 RIGHT WRIST PAIN: Primary | ICD-10-CM

## 2019-10-16 DIAGNOSIS — R45.89 FEELS DEPRESSED: ICD-10-CM

## 2019-10-16 DIAGNOSIS — F41.9 ANXIETY: ICD-10-CM

## 2019-10-16 PROCEDURE — 99214 OFFICE O/P EST MOD 30 MIN: CPT | Performed by: INTERNAL MEDICINE

## 2019-10-16 RX ORDER — HYDROXYZINE HYDROCHLORIDE 25 MG/1
12.5-25 TABLET, FILM COATED ORAL EVERY 8 HOURS PRN
Qty: 60 TABLET | Refills: 1 | Status: SHIPPED | OUTPATIENT
Start: 2019-10-16 | End: 2019-11-25

## 2019-10-16 RX ORDER — DESVENLAFAXINE 50 MG/1
50 TABLET, FILM COATED, EXTENDED RELEASE ORAL DAILY
Qty: 30 TABLET | Refills: 1 | Status: SHIPPED | OUTPATIENT
Start: 2019-10-16 | End: 2019-11-25

## 2019-10-16 RX ORDER — OXYCODONE HYDROCHLORIDE 5 MG/1
5 TABLET ORAL EVERY 6 HOURS PRN
Qty: 12 TABLET | Refills: 0 | Status: CANCELLED | OUTPATIENT
Start: 2019-10-16

## 2019-10-16 RX ORDER — HYDROXYZINE HYDROCHLORIDE 25 MG/1
25 TABLET, FILM COATED ORAL DAILY
COMMUNITY
Start: 2019-08-20 | End: 2019-10-16

## 2019-10-16 ASSESSMENT — PAIN SCALES - GENERAL: PAINLEVEL: NO PAIN (1)

## 2019-10-16 ASSESSMENT — MIFFLIN-ST. JEOR: SCORE: 1450.11

## 2019-10-16 ASSESSMENT — PATIENT HEALTH QUESTIONNAIRE - PHQ9: SUM OF ALL RESPONSES TO PHQ QUESTIONS 1-9: 9

## 2019-10-16 NOTE — PROGRESS NOTES
"Subjective     Ginger Marshall is a 79 year old female who presents to clinic today for the following health issues:    Patient is here today for a routine medication recheck     She has significant pain in her right wrist.  She is going through physical therapy.  She is having a panic attack from the pain and is having worsening depression.  See phq9 for her symptoms.  \"Nothing seems right and I can't seem to do anything.  I am incapable of taking care of herself anymore because I don't have a hand.\"    The right hand is very weak.  She is taking tylenol and alleve.  She feels that this doesn't help.  She uses heat as well.  She doesn't feel they are helping but continues to take them.  It doesn't hurt if she doesn't move her wrist.  The pain is improved as well.  The pain felt like knives at first but this part is better.  Now it is more of a constant ache.  She isn't taking oxycodone as she doesn't have any.     She took hydroxyzine for the anxiety and feels that they must have worked as she is less crabby.  She hasn't had anymore severe panic attacks.     Last surgery was 8/20/19.        History of Present Illness        Hyperlipidemia:  She presents for follow up of hyperlipidemia.  She is taking medication to lower cholesterol. She is not having myalgia or other side effects to statin medications.She is not reporting shortness of breath, increased sweating or nausea with activity, left-sided neck or arm pain, chest pain or pressure, or pain in calves when walking 1-2 blocks.    She eats 0-1 servings of fruits and vegetables daily.She consumes 0 sweetened beverage(s) daily.  She is taking medications regularly.       See phq9 for her symptoms.         Reviewed and updated as needed this visit by Provider  Tobacco  Allergies  Meds  Problems  Med Hx  Surg Hx  Fam Hx         Review of Systems    ROS: 10 point ROS neg other than the symptoms noted above in the HPI.       Objective    /70 (BP Location: " "Left arm, Cuff Size: Adult Regular)   Pulse 89   Temp 97.4  F (36.3  C) (Oral)   Resp 18   Ht 1.723 m (5' 7.85\")   Wt 91.9 kg (202 lb 9.6 oz)   SpO2 97%   BMI 30.94 kg/m    Body mass index is 30.94 kg/m .  Physical Exam   GENERAL APPEARANCE: healthy, alert and no distress  RESP: lungs clear to auscultation - no rales, rhonchi or wheezes  CV: regular rates and rhythm and normal S1 S2, no S3 or S4  PSYCH: mentation appears normal. and affect depressed and tearful  Right wrist with well healed surgical incision, minimal swelling  No edema     Diagnostic Test Results:  Labs reviewed in Epic  Results for orders placed or performed in visit on 09/12/19   Hepatic panel   Result Value Ref Range    Bilirubin Direct <0.1 0.0 - 0.2 mg/dL    Bilirubin Total 0.3 0.2 - 1.3 mg/dL    Albumin 3.8 3.4 - 5.0 g/dL    Protein Total 7.5 6.8 - 8.8 g/dL    Alkaline Phosphatase 45 40 - 150 U/L    ALT 25 0 - 50 U/L    AST 21 0 - 45 U/L   CRP inflammation   Result Value Ref Range    CRP Inflammation <2.9 0.0 - 8.0 mg/L   Erythrocyte sedimentation rate auto   Result Value Ref Range    Sed Rate 34 (H) 0 - 30 mm/h   Creatinine   Result Value Ref Range    Creatinine 0.55 0.52 - 1.04 mg/dL    GFR Estimate >90 >60 mL/min/[1.73_m2]    GFR Estimate If Black >90 >60 mL/min/[1.73_m2]   CBC with platelets differential   Result Value Ref Range    WBC 4.8 4.0 - 11.0 10e9/L    RBC Count 3.83 3.8 - 5.2 10e12/L    Hemoglobin 11.6 (L) 11.7 - 15.7 g/dL    Hematocrit 36.7 35.0 - 47.0 %    MCV 96 78 - 100 fl    MCH 30.3 26.5 - 33.0 pg    MCHC 31.6 31.5 - 36.5 g/dL    RDW 14.2 10.0 - 15.0 %    Platelet Count 348 150 - 450 10e9/L    Diff Method Automated Method     % Neutrophils 57.1 %    % Lymphocytes 25.1 %    % Monocytes 11.5 %    % Eosinophils 6.1 %    % Basophils 0.2 %    Absolute Neutrophil 2.7 1.6 - 8.3 10e9/L    Absolute Lymphocytes 1.2 0.8 - 5.3 10e9/L    Absolute Monocytes 0.6 0.0 - 1.3 10e9/L    Absolute Eosinophils 0.3 0.0 - 0.7 10e9/L    " Absolute Basophils 0.0 0.0 - 0.2 10e9/L           Assessment & Plan     1. Right wrist pain  She doesn't have great options for pain control.  Continue with tylenol and ibuprofen.  Avoid narcotics due to age and the fact that this is not acute pain any longer.  Declines gabapentin.  Per patient instructions.   - [facility] PT, HAND, AND CHIROPRACTIC REFERRAL; Future  - hydrOXYzine (ATARAX) 25 MG tablet; Take 0.5-1 tablets (12.5-25 mg) by mouth every 8 hours as needed for anxiety or other (pain)  Dispense: 60 tablet; Refill: 1    2. Rheumatoid arthritis involving multiple sites with positive rheumatoid factor (H)  Per rheumatology     3. Feels depressed    - desvenlafaxine (PRISTIQ) 50 MG 24 hr tablet; Take 1 tablet (50 mg) by mouth daily  Dispense: 30 tablet; Refill: 1    4. Anxiety    - desvenlafaxine (PRISTIQ) 50 MG 24 hr tablet; Take 1 tablet (50 mg) by mouth daily  Dispense: 30 tablet; Refill: 1  - hydrOXYzine (ATARAX) 25 MG tablet; Take 0.5-1 tablets (12.5-25 mg) by mouth every 8 hours as needed for anxiety or other (pain)  Dispense: 60 tablet; Refill: 1         Patient Instructions   Tiger Balm and CBD oil topically.  Neither are FDA regulated.  Pristiq 1 tab daily for your mental health and should also help with pain.  Acupuncture as needed.   Blood pressure check with Dr. Byers and then Rosina with how you are doing.     Return in about 3 months (around 1/16/2020) for Medication check.    Georgia Vázquez MD  [facility]

## 2019-10-16 NOTE — PATIENT INSTRUCTIONS
Tiger Balm and CBD oil topically.  Neither are FDA regulated.  Pristiq 1 tab daily for your mental health and should also help with pain.  Acupuncture as needed.   Blood pressure check with Dr. Byers and then Rosina with how you are doing.

## 2019-10-31 ENCOUNTER — OFFICE VISIT (OUTPATIENT)
Dept: RHEUMATOLOGY | Facility: CLINIC | Age: 79
End: 2019-10-31
Payer: COMMERCIAL

## 2019-10-31 VITALS
OXYGEN SATURATION: 97 % | WEIGHT: 201.6 LBS | HEART RATE: 71 BPM | DIASTOLIC BLOOD PRESSURE: 68 MMHG | SYSTOLIC BLOOD PRESSURE: 108 MMHG | BODY MASS INDEX: 30.79 KG/M2

## 2019-10-31 DIAGNOSIS — M05.79 RHEUMATOID ARTHRITIS INVOLVING MULTIPLE SITES WITH POSITIVE RHEUMATOID FACTOR (H): Primary | Chronic | ICD-10-CM

## 2019-10-31 DIAGNOSIS — Z79.899 HIGH RISK MEDICATION USE: ICD-10-CM

## 2019-10-31 PROCEDURE — 99213 OFFICE O/P EST LOW 20 MIN: CPT | Performed by: INTERNAL MEDICINE

## 2019-10-31 SDOH — HEALTH STABILITY: MENTAL HEALTH: HOW OFTEN DO YOU HAVE A DRINK CONTAINING ALCOHOL?: NEVER

## 2019-10-31 NOTE — NURSING NOTE
RAPID3 (0-30) Cumulative Score  3.8          RAPID3 Weighted Score (divide #4 by 3 and that is the weighted score)  1.2

## 2019-10-31 NOTE — PATIENT INSTRUCTIONS
Rheumatology    Dr. Nico Byers       [facility] in [place]   (Monday)  [address]  [place] [address]       M [facility] in [place]   (Tuesday)  [address] [place] [address]    [facility] in [place]   (Wed., Thurs., and Friday)  [address] [place] [address]    Phone number: [phone]  Thank you for choosing [facility].  TOSHA Sainz RMA

## 2019-10-31 NOTE — PROGRESS NOTES
Rheumatology Clinic Visit      Ginger Marshall MRN# [id number]   YOB: 1940 Age: 79 year old      Date of visit: 10/31/19   PCP: Georgia Vázquez MD     Chief Complaint   Patient presents with:  RECHECK: RA    Assessment and Plan   1. Seropositive ( [2009], CCP >100 [2009]; hx of rheumatoid nodule by 6/14/2011 pathology) Erosive Rheumatoid Arthritis: Previously failed remicade (staph infection during therapy, but was immediately after a joint injection), orencia (migraines), HCQ (ineffective).  Sulfa allergy.  Currently on methotrexate 25 mg SQ once weekly, folic acid 800 mcg daily, Xeljanz XR 11mg daily.  She also has prednisone 20 mg daily ×5 days to use as needed for flare. Methotrexate was reduced in the past with worsening symptoms; later increased and changed to SQ for improved bioavailability.  Doing well today so will continue same.   - Continue methotrexate from 25mg SQ once weekly  - Continue folic acid 800mcg daily as noted above (from over-the-counter supplements)  - Continue Xeljanz XR 11mg daily  - Fasting labs in mid-Dec: CBC, CMP, Lipid Panel              Rapid 3, cumulative scores                       10/31/2019: 3.8 (MTX 25mg PO wkly, Xeljanz XR 11mg daily)                        07/11/2019: 8.2 (MTX 25mg PO wkly, HCQ 400mg daily, Xeljanz XR 11mg daily)                        03/06/2019: ?    (MTX 25mg PO wkly, HCQ 400mg daily, Xeljanz XR 11mg daily)                       12/12/2018: 7.5 (MTX 25mg wkly, HCQ 400mg daily, Xeljanz XR 11mg daily)                       08/08/2018: 7    (MTX 22.5mg wkly, Xeljanz XR 11mg daily)                       02/07/2018: 4    (MTX 22.5mg wkly, Xeljanz XR 11mg daily)                       11/08/2017: 5    (MTX 22.5mg wkly, Xeljanz XR 11mg daily)                       08/09/2017: 0    (MTX 22.5mg wkly, Xeljanz XR 11mg daily)                      05/10/2017: 5    (MTX 25mg wkly, Xeljanz XR 11mg daily) RA doing well    2. Right hip pain: Status post WALTER  twice in the past. Followed by [facility] orthopedics.     3. Osteoporosis: was previously managed by endocrinology and she received reclast once in 2016.  She does not want to use any osteoporosis medication at this time.  We discussed the tx options; risks of treatment and risks of not treating.  12/12/2018 DEXA ordered by Dr. Vázquez showed osteoporosis.      4. Iron Deficiency Anemia: Managed by PCP.     5. Left 1st toe pain, history: 2 episodes of sudden onset left toe pain followed by diffuse left foot pain that made ambulation difficult.  Also with nodules over both Achilles tendons and the right elbow that are either rheumatoid nodules or tophi.  She reports having history of gout decades ago.  Denies ever being on colchicine or allopurinol.  Discussed colchicine.  Check uric acid today.  - Colchicine: (MITIGARE) 0.6 MG capsule; Take 2 capsules (1.2 mg) by mouth once for 1 dose at the onset of a gout flare, followed by 1 capsule (0.6mg) 1 hour later.  Dispense: 3 capsule; Refill: 0    6.  Vaccinations: Vaccinations reviewed with Ms. Marshall.   - Influenza: encouraged yearly vaccination ; up to date per patient  - [id number]: up to date  - [id number]: up to date  - Shingrix: Up to date    Ms. Marshall verbalized agreement with and understanding of the rational for the diagnosis and treatment plan.  All questions were answered to best of my ability and the patient's satisfaction. Ms. Marshall was advised to contact the clinic with any questions that may arise after the clinic visit.      Thank you for involving me in the care of the patient    Return to clinic: 3-4 months    HPI   Ginger Marshall is a 79 year old female with a history of multiple foot surgeries, hand tendon transfers, MCP replacements (most recent being in August 2015), bilateral TKA, right WALTER, left distal radius fracture history, gout, s/p lumbar fusion, osteoporosis and seropositive (RF+, CCP+) erosive rheumatoid arthritis who presents for follow-up  of rheumatoid arthritis.      Today, Ms. Marshall reports that she is doing well with regard to rheumatoid arthritis.  Tolerating medications.  Still some wrist pain status-post surgery 2 months ago, but no swelling, increased warmth, or overlying redness.  No change in symptoms since stopping Plaquenil.  Does not want to initiate osteoporosis treatment.     Recent nodule excision from the right elbow and right wrist surgery.     Denies fevers, chills, nausea, vomiting, constipation, diarrhea. No abdominal pain. No chest pain/pressure, palpitations, or shortness of breath. No nasal or oral sores.   No neck pain. No rash. No LE swelling.     Tobacco: quit in 1999  EtOH: 1 glass of wine every month at most  Drugs: None  Occupation: , retired     ROS   GEN: No fevers, chills  SKIN: No itching, rashes, sores  HEENT:  No oral or nasal ulcers.  CV: No chest pain, pressure, palpitations, or dyspnea on exertion.  PULM: No SOB, wheeze, cough.  GI:  No nausea, vomiting, constipation, diarrhea. No blood in stool. No abdominal pain.  : No blood in urine.  MSK: See HPI.  NEURO: No numbness or tingling  EXT: No LE swelling    Active Problem List     Patient Active Problem List   Diagnosis     RA (Rheumatoid Arthritis) off Plaquenil     Menopause     History of colonic polyps     Mitral Regurgitation     Multinodular goiter     CARDIOVASCULAR SCREENING; LDL GOAL LESS THAN 130     Psychophysiologic insomnia     Pulmonary nodule     PSEUDOPHAKIA OU     PVD (POSTERIOR VITREOUS DETACHMENT) OU     PXF (PSEUDOEXFOLIATION OF LENS CAPSULE) OD     GERD (gastroesophageal reflux disease)     Hyperlipidemia LDL goal <100     Advance Care Planning     Neuropathy     SHERRY (obstructive sleep apnea)-severe (AHI 35)     zEncounter for counseling     Anemia of chronic disease     Migraine     Osteoporosis     Cornea guttata, ou     Conjunctival concretions     Stenosis, spinal, lumbar     Other chronic pain     Obesity      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     MGD (meibomian gland dysfunction)     Blepharitis of both eyes     Personal history of healed osteoporosis fracture     Iron malabsorption     Hyperglycemia     Chronic bilateral low back pain without sciatica     Allergic state, subsequent encounter     BMI 32.0-32.9,adult     Spinal stenosis of lumbar region without neurogenic claudication     Herniated nucleus pulposus, L3-4     S/P lumbar fusion     Anxiety     Low iron     BMI 28.0-28.9,adult     History of depression     DDD (degenerative disc disease), lumbar     Past Medical History     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     History of total knee replacement 7/23/2009     Menopause late 40's     Other chronic pain     joints     Pelvic fracture (H) 5/13/2014     PUD (peptic ulcer disease)      Rheumatoid arteritis (H)      Sleep apnea     Uses a CPAP     Vitamin B12 deficiency      Past Surgical History     Past Surgical History:   Procedure Laterality Date     ABDOMEN SURGERY      c-sections     ARTHROSCOPY KNEE RT/LT  01/06    left     BACK SURGERY  2013    disc     BREAST BIOPSY, RT/LT      left benign     BREAST SURGERY  90's    lumpectomy     C ANESTH,TOTAL HIP ARTHROPLASTY  2010    Rt hip     C HAND/FINGER SURGERY UNLISTED  11/05    right hand     C NONSPECIFIC PROCEDURE  91    left foot surgery     C NONSPECIFIC PROCEDURE  95    R MCP surgery     C TOTAL KNEE ARTHROPLASTY  2006    left     C/SECTION, LOW TRANSVERSE  66,72    x 2     CATARACT IOL, RT/LT       COLONOSCOPY  2006,2009     EYE SURGERY      cataracs     HC ESOPH/GAS REFLUX TEST W NASAL IMPED >1 HR  2/1/2012    Procedure:ESOPHAGEAL IMPEDENCE FUNCTION TEST WITH 24 HOUR PH GREATER THAN 1 HOUR; Surgeon:KAYKAY JULIO; Location:[facility]     IR CAUDAL EPIDURAL INJECTION SINGLE  5/2/2012     "LESI L5-S1 at [facility]     OPTICAL TRACKING SYSTEM FUSION POSTERIOR SPINE LUMBAR N/A 4/3/2017    Procedure: OPTICAL TRACKING SYSTEM FUSION SPINE POSTERIOR LUMBAR ONE LEVEL;  Surgeon: Anthony Michele MD;  Location: [facility]     SURGICAL HISTORY OF -   2007    right knee total replacement     Allergy     Allergies   Allergen Reactions     Abatacept Other (See Comments)     Severe headaches  Migraine     Celebrex [Celecoxib]      Ineffective     Celecoxib Unknown and Nausea     Ethanol      Antihistamines     Orencia [Abatacept]      Headache     Septra [Bactrim]      Sulfa Drugs Nausea and Vomiting     \"deathly ill\"  Allergic to everything with Sulfa in it.     Sulfamethoxazole-Trimethoprim Nausea and Nausea and Vomiting     Tramadol Other (See Comments)     Headache  Migraine headache     Valdecoxib Unknown and Nausea     Made me \"very very ill\", might of been \"cramping\"     Adhesive Tape Rash     Plastic tape  Plastic tapes     Antihistamines, Chlorpheniramine-Type  [Alkylamines] Anxiety and Other (See Comments)     Current Medication List     Current Outpatient Medications   Medication Sig     acetaminophen (TYLENOL) 500 MG tablet Take 2 tablets (1,000 mg) by mouth every 8 hours as needed for pain     atorvastatin (LIPITOR) 40 MG tablet TAKE ONE TABLET BY MOUTH EVERY DAY     Cholecalciferol (VITAMIN D-3 PO) Take 1,000 Units by mouth 2 times daily     desvenlafaxine (PRISTIQ) 50 MG 24 hr tablet Take 1 tablet (50 mg) by mouth daily     FOLIC ACID PO Take 800 mcg by mouth daily     hydrOXYzine (ATARAX) 25 MG tablet Take 0.5-1 tablets (12.5-25 mg) by mouth every 8 hours as needed for anxiety or other (pain)     ibuprofen (ADVIL) 200 MG tablet Take 200 mg by mouth every 4 hours as needed for mild pain     Methotrexate, PF, (RASUVO) 25 MG/0.5ML autoinjector Inject 0.5 mLs (25 mg) Subcutaneous every 7 days . Hold for signs of infection, and seek medical attention.     naproxen sodium (ANAPROX) 220 MG tablet Take 220 " "mg by mouth 2 times daily (with meals)     pantoprazole (PROTONIX) 40 MG EC tablet TAKE ONE TABLET BY MOUTH EVERY DAY 30-60 MINUTES BEFORE A MEAL     tofacitinib (XELJANZ XR) 11 MG 24 hr tablet Take 1 tablet (11 mg) by mouth daily     topiramate (TOPAMAX) 50 MG tablet Take 1 tablet (50 mg) by mouth 2 times daily     triamcinolone (KENALOG) 0.1 % external cream Apply topically 2 times daily as needed for irritation     ipratropium (ATROVENT) 0.06 % nasal spray Spray 2 sprays into both nostrils 4 times daily as needed for rhinitis (Patient not taking: Reported on 10/31/2019)     multivitamin, therapeutic with minerals (MULTI-VITAMIN) TABS Take 1 tablet by mouth daily     No current facility-administered medications for this visit.      Social History   See HPI    Family History     Family History   Problem Relation Age of Onset     Arthritis Mother      Alzheimer Disease Mother      Hyperlipidemia Mother      Osteoporosis Mother      Heart Disease Father         MI ( from this)     Alcohol/Drug Father      Arthritis Sister      Hypertension Sister      Cancer Son      Diabetes Son      Neurologic Disorder Sister         Schizophrenic     Hypertension Sister      Hyperlipidemia Sister      Mental Illness Sister      Diabetes Son      Other Cancer Son      Glaucoma Daughter      No change in family history since the previous clinic visit.    Physical Exam     Temp Readings from Last 3 Encounters:   10/16/19 97.4  F (36.3  C) (Oral)   08/15/19 96.8  F (36  C) (Oral)   19 97.1  F (36.2  C) (Oral)     BP Readings from Last 5 Encounters:   10/31/19 108/68   10/16/19 118/70   08/15/19 130/68   19 104/68   19 122/68     Pulse Readings from Last 1 Encounters:   10/31/19 71     Resp Readings from Last 1 Encounters:   10/16/19 18     Estimated body mass index is 30.79 kg/m  as calculated from the following:    Height as of 10/16/19: 1.723 m (5' 7.85\").    Weight as of this encounter: 91.4 kg (201 lb 9.6 " oz).    GEN: NAD   HEENT: MMM. No oral lesions. Anicteric, noninjected sclera  CV: S1, S2. RRR. No m/r/g.  PULM: CTA bilaterally. No w/c.  MSK: MCPs and PIPs without synovial swelling or tenderness palpation.  Inability to fully extend the right third PIP.   Subluxation of the bilateral MCPs. Right hand with old surgical scars over the MCPs.  Right wrist mildly tender to palpation but without swelling, increased warmth, or overlying erythema; surgical scar present. Left wrist without swelling or tenderness to palpation. Elbows, shoulders, knees, ankles, and MTPs without swelling or tenderness palpation.   Hypertrophy at the right lateral midfoot.   SKIN: No rash  EXT: No LE edema  PSYCH: Alert. Appropriate.     Labs   RF/CCP  Recent Labs   Lab Test 09/09/11  0843   RHF 38*     CBC  Recent Labs   Lab Test 09/12/19  1019 08/15/19  1118 06/12/19  0956 05/08/19  1012   WBC 4.8  --  5.5 6.7   RBC 3.83  --  3.86 3.91   HGB 11.6* 11.7 12.0 12.1   HCT 36.7  --  37.2 37.8   MCV 96  --  96 97   RDW 14.2  --  14.9 14.4     --  355 338   MCH 30.3  --  31.1 30.9   MCHC 31.6  --  32.3 32.0   NEUTROPHIL 57.1  --  60.5 57.6   LYMPH 25.1  --  20.1 20.7   MONOCYTE 11.5  --  13.5 15.7   EOSINOPHIL 6.1  --  5.7 5.7   BASOPHIL 0.2  --  0.2 0.3   ANEU 2.7  --  3.3 3.9   ALYM 1.2  --  1.1 1.4   MARYURI 0.6  --  0.7 1.1   AEOS 0.3  --  0.3 0.4   ABAS 0.0  --  0.0 0.0     CMP  Recent Labs   Lab Test 09/12/19  1019 08/15/19  1118 06/12/19  0956 05/08/19  1012  03/06/19  1351  11/23/18  1309  03/27/17  1142   NA  --  144  --   --   --   --   --  141  --  139   POTASSIUM  --  3.5  --   --   --   --   --  3.9  --  4.0   CHLORIDE  --  112*  --   --   --   --   --  107  --  107   CO2  --  25  --   --   --   --   --  29  --  25   ANIONGAP  --  7  --   --   --   --   --  5  --  7   GLC  --  95  --   --   --   --   --  123*  --  96   BUN  --  16  --   --   --   --   --  14  --  11   CR 0.55 0.57 0.64 0.61   < > 0.64   < > 0.59   < > 0.62    GFRESTIMATED >90 89 86 87   < > 86   < > >90   < > >90  Non  GFR Calc     GFRESTBLACK >90 >90 >90 >90   < > >90   < > >90   < > >90  African American GFR Calc     BRANDEE  --  9.6  --   --   --  9.3  --  9.2  --  9.7   BILITOTAL 0.3  --  0.4 0.3   < > 0.3   < > 0.3   < >  --    ALBUMIN 3.8  --  3.9 3.9   < > 4.0   < > 3.9   < >  --    PROTTOTAL 7.5  --  7.7 7.6   < > 7.6   < > 7.6   < >  --    ALKPHOS 45  --  46 49   < > 42   < > 55   < >  --    AST 21  --  26 23   < > 23   < > 29   < >  --    ALT 25  --  32 36   < > 32   < > 33   < >  --     < > = values in this interval not displayed.     Calcium/VitaminD  Recent Labs   Lab Test 08/15/19  1118 03/06/19  1351 11/23/18  1309  10/28/16  1239  02/05/13  1050   BRANDEE 9.6 9.3 9.2   < > 9.4   < > 9.3   VITDT  --  41  --   --  37  --  33    < > = values in this interval not displayed.     ESR/CRP  Recent Labs   Lab Test 09/12/19  1019 05/08/19  1012 03/06/19  1351   SED 34* 21 22   CRP <2.9 <2.9 <2.9     Lipid Panel  Recent Labs   Lab Test 11/23/18  1309 12/14/17  0957 10/28/16  1238 12/30/15  0842 01/13/14  1109 07/24/13  1024  04/13/12  0831   CHOL  --  164 176 182 135 177  --  167   TRIG  --  121 103 70 89 80  --  115   HDL  --  79 77 92 49* 89  --  59   LDL 72 61 78 76 68 72   < > 85   VLDL  --   --   --   --  18 16  --  23   CHOLHDLRATIO  --   --   --   --  2.8 2.0  --  2.8   NHDL  --  85 99 90  --   --   --   --     < > = values in this interval not displayed.     Hepatitis B  Recent Labs   Lab Test 09/15/15  1159 04/30/12  1005   AUSAB 0.11  --    HBCAB Nonreactive  --    HEPBANG Nonreactive Negative     Hepatitis C  Recent Labs   Lab Test 09/15/15  1159 04/30/12  1005   HCVAB Nonreactive   Assay performance characteristics have not been established for newborns,   infants, and children   Negative     Tuberculosis Screening  Recent Labs   Lab Test 05/07/18  1033 09/15/15  1200 04/30/12  1006   TBRSLT Negative Negative Negative   TBAGN 0.00 0.00 0.00  "    \"HAND BILATERAL THREE OR MORE VIEWS 9/15/2015 12:28 PM   HISTORY: Rheumatoid arthritis; establish baseline. Rheumatoid  arthritis(714.0)  COMPARISON: None  IMPRESSION  IMPRESSION: There is diffuse osteopenia. Postoperative changes of the  right second and third metacarpophalangeal joints. Moderate joint  space narrowing involving the left second and third  metacarpophalangeal joints. Mild joint space narrowing of the right  fourth and fifth metacarpophalangeal joints. There are also small  periarticular erosive changes of the metacarpophalangeal joints. There  is fusion of several of the right carpal bones. Marked joint space  loss in the left wrist. Findings are consistent with the clinical  history of rheumatoid arthritis. Chronic fracture deformities of the  right distal radius and ulna. No acute fracture is seen.  SPENCER MONSALVE MD\"    Immunization History     Immunization History   Administered Date(s) Administered     FLU 6-35 months 10/24/2006, 11/14/2007, 10/22/2008, 10/21/2009     Flu, Unspecified 10/20/2013     Influenza (High Dose) 3 valent vaccine 10/28/2013, 09/23/2014, 11/11/2015, 09/23/2016, 09/24/2019     Influenza (IIV3) PF 10/12/1999, 10/26/2001, 10/19/2002, 10/30/2003, 10/28/2004, 10/21/2005, 10/26/2007, 10/21/2009, 10/05/2011, 10/13/2012     Influenza Vaccine Im 4yrs+ 4 Valent CCIIV4 09/28/2017, 09/27/2018     Mantoux Tuberculin Skin Test 11/03/2006     Pneumo Conj 13-V (2010&after) 07/29/2015     Pneumococcal 23 valent 06/26/2000, 10/26/2001, 06/01/2007, 06/02/2010     TD (ADULT, 7+) 12/10/2008, 07/20/2009     Tdap (Adult) Unspecified Formulation 12/12/2018     Zoster vaccine recombinant adjuvanted (SHINGRIX) 12/12/2018, 02/14/2019          Chart documentation done in part with Dragon Voice recognition Software. Although reviewed after completion, some word and grammatical error may remain.    Nico Byers MD  "

## 2019-11-25 ENCOUNTER — OFFICE VISIT (OUTPATIENT)
Dept: INTERNAL MEDICINE | Facility: CLINIC | Age: 79
End: 2019-11-25
Payer: COMMERCIAL

## 2019-11-25 ENCOUNTER — ANCILLARY PROCEDURE (OUTPATIENT)
Dept: GENERAL RADIOLOGY | Facility: CLINIC | Age: 79
End: 2019-11-25
Attending: INTERNAL MEDICINE
Payer: COMMERCIAL

## 2019-11-25 VITALS
BODY MASS INDEX: 30.88 KG/M2 | DIASTOLIC BLOOD PRESSURE: 80 MMHG | OXYGEN SATURATION: 99 % | TEMPERATURE: 96.3 F | WEIGHT: 202.2 LBS | RESPIRATION RATE: 16 BRPM | HEART RATE: 74 BPM | SYSTOLIC BLOOD PRESSURE: 120 MMHG

## 2019-11-25 DIAGNOSIS — R45.89 FEELS DEPRESSED: ICD-10-CM

## 2019-11-25 DIAGNOSIS — S22.42XA CLOSED FRACTURE OF MULTIPLE RIBS OF LEFT SIDE, INITIAL ENCOUNTER: Primary | ICD-10-CM

## 2019-11-25 DIAGNOSIS — F41.9 ANXIETY: ICD-10-CM

## 2019-11-25 DIAGNOSIS — M81.0 OSTEOPOROSIS, UNSPECIFIED OSTEOPOROSIS TYPE, UNSPECIFIED PATHOLOGICAL FRACTURE PRESENCE: Chronic | ICD-10-CM

## 2019-11-25 PROCEDURE — 71101 X-RAY EXAM UNILAT RIBS/CHEST: CPT | Mod: LT

## 2019-11-25 PROCEDURE — 99213 OFFICE O/P EST LOW 20 MIN: CPT | Performed by: INTERNAL MEDICINE

## 2019-11-25 RX ORDER — DESVENLAFAXINE 50 MG/1
50 TABLET, FILM COATED, EXTENDED RELEASE ORAL DAILY
Qty: 90 TABLET | Refills: 3 | Status: SHIPPED | OUTPATIENT
Start: 2019-11-25 | End: 2020-06-16

## 2019-11-25 RX ORDER — OXYCODONE HYDROCHLORIDE 5 MG/1
5 TABLET ORAL EVERY 6 HOURS PRN
Qty: 12 TABLET | Refills: 0 | Status: SHIPPED | OUTPATIENT
Start: 2019-11-25 | End: 2020-01-15

## 2019-11-25 ASSESSMENT — PAIN SCALES - GENERAL: PAINLEVEL: EXTREME PAIN (8)

## 2019-11-25 NOTE — PATIENT INSTRUCTIONS
- Start oxycodone 1 tablet (5 mg) by mouth every 6 hours as needed for pain.  - You can continue to take Tylenol.  - You can also try lidocaine patches for pain.  - You can try rib binder. Will take about 6 weeks for your fractured ribs to heal.

## 2019-11-25 NOTE — PROGRESS NOTES
Subjective       Ginger Marshall is a 79 year old female who presents to clinic today for the following health issues:      Rosina Note 11/24/2019: Yesterday I saw a chiropractor student who adjusted my upper back and in doing so caused an excruciating pain in my chest that felt like a rib fracture.  Neither heat nor ice nor Advil or Tylenol are making any difference in the pain.  It affects my breath when I bend.  I don't think there really is anything that can be done for injured ribs, but I would like to know if something did happen to them or not and suggestions for this terrible pain.       HPI   Left Rib and Chest Pain  Patient relates that she was laying on her abdomen when the chiropractor pushed on her back when she took a deep breath. She notes that she heard a pop and felt sharp pain. Pain is only localized on the left side. Bending down hurts to breath. Her neighbor gave her a couple of hydrocodone for pain. Ice, heat, advil and tylenol hasn't been helping. Patient's anxiety was better before this injury.  She does have osteoporosis    She feels that the Pristiq is working well for her.  She forgot to take it a few days and got very sad and tearful.     Patient Active Problem List   Diagnosis     RA (Rheumatoid Arthritis) off Plaquenil     Menopause     History of colonic polyps     Mitral Regurgitation     Multinodular goiter     CARDIOVASCULAR SCREENING; LDL GOAL LESS THAN 130     Psychophysiologic insomnia     Pulmonary nodule     PSEUDOPHAKIA OU     PVD (POSTERIOR VITREOUS DETACHMENT) OU     PXF (PSEUDOEXFOLIATION OF LENS CAPSULE) OD     GERD (gastroesophageal reflux disease)     Hyperlipidemia LDL goal <100     Advance Care Planning     Neuropathy     SHERRY (obstructive sleep apnea)-severe (AHI 35)     zEncounter for counseling     Anemia of chronic disease     Migraine     Osteoporosis     Cornea guttata, ou     Conjunctival concretions     Stenosis, spinal, lumbar     Other chronic pain     Obesity      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     MGD (meibomian gland dysfunction)     Blepharitis of both eyes     Personal history of healed osteoporosis fracture     Iron malabsorption     Hyperglycemia     Chronic bilateral low back pain without sciatica     Allergic state, subsequent encounter     BMI 32.0-32.9,adult     Spinal stenosis of lumbar region without neurogenic claudication     Herniated nucleus pulposus, L3-4     S/P lumbar fusion     Anxiety     Low iron     BMI 28.0-28.9,adult     History of depression     DDD (degenerative disc disease), lumbar     Closed fracture of multiple ribs of left side, initial encounter     Past Surgical History:   Procedure Laterality Date     ABDOMEN SURGERY      c-sections     ARTHROSCOPY KNEE RT/LT  01/06    left     BACK SURGERY  2013    disc     BREAST BIOPSY, RT/LT      left benign     BREAST SURGERY  90's    lumpectomy     C ANESTH,TOTAL HIP ARTHROPLASTY  2010    Rt hip     C HAND/FINGER SURGERY UNLISTED  11/05    right hand     C NONSPECIFIC PROCEDURE  91    left foot surgery     C NONSPECIFIC PROCEDURE  95    R MCP surgery     C TOTAL KNEE ARTHROPLASTY  2006    left     C/SECTION, LOW TRANSVERSE  66,72    x 2     CATARACT IOL, RT/LT       COLONOSCOPY  2006,2009     EYE SURGERY      cataracs     HC ESOPH/GAS REFLUX TEST W NASAL IMPED >1 HR  2/1/2012    Procedure:ESOPHAGEAL IMPEDENCE FUNCTION TEST WITH 24 HOUR PH GREATER THAN 1 HOUR; Surgeon:KAYKAY JULIO; Location:[facility]     IR CAUDAL EPIDURAL INJECTION SINGLE  5/2/2012    LESI L5-S1 at [facility]     OPTICAL TRACKING SYSTEM FUSION POSTERIOR SPINE LUMBAR N/A 4/3/2017    Procedure: OPTICAL TRACKING SYSTEM FUSION SPINE POSTERIOR LUMBAR ONE LEVEL;  Surgeon: Anthony Michele MD;  Location:  OR     SURGICAL HISTORY OF -   2007    right knee total replacement       Social History     Tobacco Use     Smoking status: Former Smoker     Packs/day: 1.00     Years: 41.00     Pack years: 41.00     Types:  Cigarettes     Last attempt to quit: 1999     Years since quittin.9     Smokeless tobacco: Never Used     Tobacco comment: former smoker   Substance Use Topics     Alcohol use: Never     Alcohol/week: 0.0 standard drinks     Frequency: Never     Family History   Problem Relation Age of Onset     Arthritis Mother      Alzheimer Disease Mother      Hyperlipidemia Mother      Osteoporosis Mother      Heart Disease Father         MI ( from this)     Alcohol/Drug Father      Arthritis Sister      Hypertension Sister      Cancer Son      Diabetes Son      Neurologic Disorder Sister         Schizophrenic     Hypertension Sister      Hyperlipidemia Sister      Mental Illness Sister      Diabetes Son      Other Cancer Son      Glaucoma Daughter          Current Outpatient Medications   Medication Sig Dispense Refill     acetaminophen (TYLENOL) 500 MG tablet Take 2 tablets (1,000 mg) by mouth every 8 hours as needed for pain 100 tablet 0     atorvastatin (LIPITOR) 40 MG tablet TAKE ONE TABLET BY MOUTH EVERY DAY 90 tablet 2     Cholecalciferol (VITAMIN D-3 PO) Take 1,000 Units by mouth 2 times daily       Coenzyme Q10 (CO Q 10 PO)        desvenlafaxine (PRISTIQ) 50 MG 24 hr tablet Take 1 tablet (50 mg) by mouth daily 90 tablet 3     FOLIC ACID PO Take 800 mcg by mouth daily       ibuprofen (ADVIL) 200 MG tablet Take 200 mg by mouth every 4 hours as needed for mild pain       ipratropium (ATROVENT) 0.06 % nasal spray Spray 2 sprays into both nostrils 4 times daily as needed for rhinitis 1 Box 3     Methotrexate, PF, (RASUVO) 25 MG/0.5ML autoinjector Inject 0.5 mLs (25 mg) Subcutaneous every 7 days . Hold for signs of infection, and seek medical attention. 2 mL 6     multivitamin, therapeutic with minerals (MULTI-VITAMIN) TABS Take 1 tablet by mouth daily       order for DME Equipment being ordered: rib binder 1 Device 0     oxyCODONE (ROXICODONE) 5 MG tablet Take 1 tablet (5 mg) by mouth every 6 hours as needed for  "pain 12 tablet 0     pantoprazole (PROTONIX) 40 MG EC tablet TAKE ONE TABLET BY MOUTH EVERY DAY 30-60 MINUTES BEFORE A MEAL 90 tablet 2     tofacitinib (XELJANZ XR) 11 MG 24 hr tablet Take 1 tablet (11 mg) by mouth daily 90 tablet 3     topiramate (TOPAMAX) 50 MG tablet Take 1 tablet (50 mg) by mouth 2 times daily 180 tablet 11     triamcinolone (KENALOG) 0.1 % external cream Apply topically 2 times daily as needed for irritation 30 g 1     Allergies   Allergen Reactions     Abatacept Other (See Comments)     Severe headaches  Migraine     Celebrex [Celecoxib]      Ineffective     Celecoxib Unknown and Nausea     Ethanol      Antihistamines     Orencia [Abatacept]      Headache     Septra [Bactrim]      Sulfa Drugs Nausea and Vomiting     \"deathly ill\"  Allergic to everything with Sulfa in it.     Sulfamethoxazole-Trimethoprim Nausea and Nausea and Vomiting     Tramadol Other (See Comments)     Headache  Migraine headache     Valdecoxib Unknown and Nausea     Made me \"very very ill\", might of been \"cramping\"     Adhesive Tape Rash     Plastic tape  Plastic tapes     Antihistamines, Chlorpheniramine-Type  [Alkylamines] Anxiety and Other (See Comments)       Reviewed and updated as needed this visit by Provider  Tobacco  Allergies  Meds  Problems  Med Hx  Surg Hx  Fam Hx         Review of Systems   ROS COMP: Constitutional, HEENT, cardiovascular, pulmonary, GI, , musculoskeletal, neuro, skin, endocrine and psych systems are negative, except as otherwise noted.    This document serves as a record of the services and decisions personally performed and made by Georgia Vázquez MD. It was created on her behalf by Ambika Mckeon, a trained medical scribe. The creation of this document is based on the provider's statements to the medical scribe.  Ambika Mckeon 11:16 AM November 25, 2019      Objective    /80 (BP Location: Left arm)   Pulse 74   Temp 96.3  F (35.7  C) (Oral)   Resp 16   Wt 91.7 kg (202 lb 3.2 oz)   " SpO2 99%   BMI 30.88 kg/m    Body mass index is 30.88 kg/m .  Physical Exam   GENERAL: healthy, alert and no distress  RESP: lungs clear to auscultation - no rales, rhonchi or wheezes  CV: regular rate and rhythm, normal S1 S2, no S3 or S4, no murmur, click or rub, no peripheral edema and peripheral pulses strong  MS: no gross musculoskeletal defects noted, no edema  PSYCH: mentation appears normal, affect normal/bright  No rib exam- patient has pain with movement.      Diagnostic Test Results:  Labs reviewed in Epic  No results found for this or any previous visit (from the past 24 hour(s)).        Assessment & Plan     1. Closed fracture of multiple ribs of left side, initial encounter  Patient notes persistent pain after upper back adjustment from chiropractic.   She has pain on the left side of her breast and underneath her breast.  She notes painful breathing when bending down.  Has tried ice, heat, advil and tylenol with little relief.  Tried hydrocodone that she got from her neighbor.  Discussed with patient that her bones can easily break.  Exam was not done due to having pain with movement.  Xray checked today. Reviewed by myself.  Xray shows 2 fractured ribs on the left ribcage.  Prescription placed today for oxycodone- per bottle instructions.  Advised patient to try using rib binder. Order placed today.  - oxyCODONE (ROXICODONE) 5 MG tablet; Take 1 tablet (5 mg) by mouth every 6 hours as needed for pain  Dispense: 12 tablet; Refill: 0  - order for DME; Equipment being ordered: rib binder  Dispense: 1 Device; Refill: 0    2. Osteoporosis, unspecified osteoporosis type, unspecified pathological fracture presence  She has seen endo in the past and has had reclast but has declined further treatment since then.     3. Feels depressed  Doing well with current regimen without complications.  Refills placed today.  - desvenlafaxine (PRISTIQ) 50 MG 24 hr tablet; Take 1 tablet (50 mg) by mouth daily  Dispense: 90  "tablet; Refill: 3    4. Anxiety  Doing well with current regimen without complications.  Refills placed today.  - desvenlafaxine (PRISTIQ) 50 MG 24 hr tablet; Take 1 tablet (50 mg) by mouth daily  Dispense: 90 tablet; Refill: 3      Patient Instructions   - Start oxycodone 1 tablet (5 mg) by mouth every 6 hours as needed for pain.  - You can continue to take Tylenol.  - You can also try lidocaine patches for pain.  - You can try rib binder. Will take about 6 weeks for your fractured ribs to heal.       BMI:   Estimated body mass index is 30.79 kg/m  as calculated from the following:    Height as of 10/16/19: 1.723 m (5' 7.85\").    Weight as of 10/31/19: 91.4 kg (201 lb 9.6 oz).   Weight management plan: Discussed healthy diet and exercise guidelines      The information in this document, created by the medical scribe for me, accurately reflects the services I personally performed and the decisions made by me. I have reviewed and approved this document for accuracy prior to leaving the patient care area.  November 25, 2019 11:16 AM    I spent 8 minutes of time with the patient and >50% of it was in education and counseling regarding health maintenance and medication recheck.  In: 11:11 AM  Out: 11:19 AM    Georgia Vázquez MD  [facility]    "

## 2019-12-16 ENCOUNTER — TELEPHONE (OUTPATIENT)
Dept: OPHTHALMOLOGY | Facility: CLINIC | Age: 79
End: 2019-12-16

## 2019-12-16 NOTE — TELEPHONE ENCOUNTER
"Received paperwork from patient's apartments regarding her Thera tears and gel drops use. She is taking these daily, checked the box \"yes\" and faxed back to her apartment complex. Right fax verified.   "

## 2019-12-18 ENCOUNTER — TELEPHONE (OUTPATIENT)
Dept: RHEUMATOLOGY | Facility: CLINIC | Age: 79
End: 2019-12-18

## 2019-12-18 NOTE — TELEPHONE ENCOUNTER
Reason for call:  Other   Patient called regarding (reason for call): call back  Additional comments:  enedelia calling regarding the forms just faxed to them for non prescriptions. Please call her back.     Phone number to reach patient:  Home number on file [phone] (home)    Best Time:   Any     Can we leave a detailed message on this number?  YES

## 2019-12-18 NOTE — TELEPHONE ENCOUNTER
Dahlia from the apartment called stating that she received this form, but stated nothing was checked on the question: are the drops recommended to treat a condition or for good health. Please call Dahlia and let her know.

## 2019-12-18 NOTE — TELEPHONE ENCOUNTER
Called and got Ginger on the phone. We were given the wrong number, we are to call her apartment at [phone]. She is on subsidized housing and they have to know why she is taking the artificial tears if for a condition or not. Has corneal concretions and also Meibomian Gland dysfunction with RA that she needs these for. Left the care coordinator Viola a message stating this and to call back with any questions.

## 2019-12-19 ENCOUNTER — TELEPHONE (OUTPATIENT)
Dept: RHEUMATOLOGY | Facility: CLINIC | Age: 79
End: 2019-12-19

## 2019-12-19 DIAGNOSIS — M05.79 RHEUMATOID ARTHRITIS INVOLVING MULTIPLE SITES WITH POSITIVE RHEUMATOID FACTOR (H): Chronic | ICD-10-CM

## 2019-12-19 DIAGNOSIS — Z79.899 HIGH RISK MEDICATION USE: ICD-10-CM

## 2019-12-19 LAB
ALBUMIN SERPL-MCNC: 4 G/DL (ref 3.4–5)
ALP SERPL-CCNC: 45 U/L (ref 40–150)
ALT SERPL W P-5'-P-CCNC: 22 U/L (ref 0–50)
ANION GAP SERPL CALCULATED.3IONS-SCNC: 7 MMOL/L (ref 3–14)
AST SERPL W P-5'-P-CCNC: 19 U/L (ref 0–45)
BASOPHILS # BLD AUTO: 0 10E9/L (ref 0–0.2)
BASOPHILS NFR BLD AUTO: 0.2 %
BILIRUB SERPL-MCNC: 0.3 MG/DL (ref 0.2–1.3)
BUN SERPL-MCNC: 10 MG/DL (ref 7–30)
CALCIUM SERPL-MCNC: 9.4 MG/DL (ref 8.5–10.1)
CHLORIDE SERPL-SCNC: 111 MMOL/L (ref 94–109)
CHOLEST SERPL-MCNC: 166 MG/DL
CO2 SERPL-SCNC: 24 MMOL/L (ref 20–32)
CREAT SERPL-MCNC: 0.68 MG/DL (ref 0.52–1.04)
DIFFERENTIAL METHOD BLD: NORMAL
EOSINOPHIL # BLD AUTO: 0.3 10E9/L (ref 0–0.7)
EOSINOPHIL NFR BLD AUTO: 5.8 %
ERYTHROCYTE [DISTWIDTH] IN BLOOD BY AUTOMATED COUNT: 14.7 % (ref 10–15)
GFR SERPL CREATININE-BSD FRML MDRD: 84 ML/MIN/{1.73_M2}
GLUCOSE SERPL-MCNC: 88 MG/DL (ref 70–99)
HCT VFR BLD AUTO: 37.7 % (ref 35–47)
HDLC SERPL-MCNC: 89 MG/DL
HGB BLD-MCNC: 12 G/DL (ref 11.7–15.7)
LDLC SERPL CALC-MCNC: 54 MG/DL
LYMPHOCYTES # BLD AUTO: 1.2 10E9/L (ref 0.8–5.3)
LYMPHOCYTES NFR BLD AUTO: 21.4 %
MCH RBC QN AUTO: 30.3 PG (ref 26.5–33)
MCHC RBC AUTO-ENTMCNC: 31.8 G/DL (ref 31.5–36.5)
MCV RBC AUTO: 95 FL (ref 78–100)
MONOCYTES # BLD AUTO: 0.9 10E9/L (ref 0–1.3)
MONOCYTES NFR BLD AUTO: 16.4 %
NEUTROPHILS # BLD AUTO: 3.1 10E9/L (ref 1.6–8.3)
NEUTROPHILS NFR BLD AUTO: 56.2 %
NONHDLC SERPL-MCNC: 77 MG/DL
PLATELET # BLD AUTO: 344 10E9/L (ref 150–450)
POTASSIUM SERPL-SCNC: 3.8 MMOL/L (ref 3.4–5.3)
PROT SERPL-MCNC: 7.6 G/DL (ref 6.8–8.8)
RBC # BLD AUTO: 3.96 10E12/L (ref 3.8–5.2)
SODIUM SERPL-SCNC: 142 MMOL/L (ref 133–144)
TRIGL SERPL-MCNC: 113 MG/DL
WBC # BLD AUTO: 5.6 10E9/L (ref 4–11)

## 2019-12-19 PROCEDURE — 80061 LIPID PANEL: CPT | Performed by: INTERNAL MEDICINE

## 2019-12-19 PROCEDURE — 85025 COMPLETE CBC W/AUTO DIFF WBC: CPT | Performed by: INTERNAL MEDICINE

## 2019-12-19 PROCEDURE — 36415 COLL VENOUS BLD VENIPUNCTURE: CPT | Performed by: INTERNAL MEDICINE

## 2019-12-19 PROCEDURE — 80053 COMPREHEN METABOLIC PANEL: CPT | Performed by: INTERNAL MEDICINE

## 2019-12-19 NOTE — TELEPHONE ENCOUNTER
Spoke with patient's assisted living and clarified details on patient's non-prescription drug expense verification forms.    Diomedes Yates RN....12/19/2019 10:04 AM

## 2019-12-31 NOTE — PROGRESS NOTES
Discharge Note    Progress reporting period is from last progress note on   to Aug 14, 2019.    Ginger failed to follow up and current status is unknown.  Please see information below for last relevant information on current status.  Patient seen for 4 visits.    SUBJECTIVE  Subjective changes noted by patient:  Pt reports she does her [employer] standing ex in [employer]  .  Current pain level is  .     Previous pain level was  5/10.   Changes in function:  Yes (See Goal flow sheet attached for changes in current functional level)  Adverse reaction to treatment or activity: None    OBJECTIVE  Changes noted in objective findings:       ASSESSMENT/PLAN  Diagnosis: low back pain, R hip pain   Updated problem list and treatment plan:   Pain - HEP  Decreased ROM/flexibility - HEP  Decreased strength - HEP  Impaired muscle performance - HEP  Impaired balance - HEP  Impaired posture - HEP  STG/LTGs have been met or progress has been made towards goals:  Yes, please see goal flowsheet for most current information  Assessment of Progress: current status is unknown.    Last current status:     Self Management Plans:  HEP      Recommendations:  Discharge with current home program.  Patient to follow up with MD as needed.    Please refer to the daily flowsheet for treatment today, total treatment time and time spent performing 1:1 timed codes.

## 2020-01-01 NOTE — MR AVS SNAPSHOT
After Visit Summary   6/27/2018    Ginger Marshall    MRN: [id number]           Patient Information     Date Of Birth          1940        Visit Information        Provider Department      6/27/2018 11:30 AM Ry Espinosa MD [facility]        Today's Diagnoses     Nasal sore    -  1    Chronic vasomotor rhinitis        Epistaxis          Care Instructions    General Scheduling Information  To schedule your CT/MRI scan, please contact [place] [facility] at [phone] [address] [place] [address]    To schedule your Surgery, please contact our Specialty Schedulers at [phone]    ENT Clinic Locations Clinic Hours Telephone Number     [facility] [place]  [address]  [place] [address]   Tuesday:       8:00am -- 4:00pm    Wednesday:  8:00am - 4:00pm   To schedule an appointment with   Dr. Espinosa,   please contact our   Specialty Scheduling Department at:     [phone]       [place] [facility]  [address] [facility] [place] [address]   Friday:          8:00am - 4:00pm         Urgent Care Locations Clinic Hours Telephone Numbers     [place]  [address] [place] [address]     Monday-Friday:     11:00pm - 9:00pm    Saturday-Sunday:  9:00am - 5:00pm   [phone]     [facility]  [address]   [place] [address]     Monday-Friday:      5:00pm - 9:00pm     Saturday-Sunday:  9:00am - 5:00pm   [phone]                   Follow-ups after your visit        Your next 10 appointments already scheduled     Jul 19, 2018 10:00 AM CDT   New Visit with Aguila San MD   [facility] ([facility]    [address] [facility] [id number] [phone]            Aug 06, 2018 11:00 AM CDT   LAB with [facility]   [facility] ([facility])    [address] [facility] [id number] [phone]           Please do not eat 10-12 hours before your appointment if you  "are coming in fasting for labs on lipids, cholesterol, or glucose (sugar). This does not apply to pregnant women. Water, hot tea and black coffee (with nothing added) are okay. Do not drink other fluids, diet soda or chew gum.            Aug 08, 2018 11:00 AM CDT   Return Visit with Nico Byers MD   [facility] ([facility])    [address] [place] [address]  [place] [address] [phone]              Who to contact     If you have questions or need follow up information about today's clinic visit or your schedule please contact [facility] directly at [phone].  Normal or non-critical lab and imaging results will be communicated to you by Weiloshart, letter or phone within 4 business days after the clinic has received the results. If you do not hear from us within 7 days, please contact the clinic through Weiloshart or phone. If you have a critical or abnormal lab result, we will notify you by phone as soon as possible.  Submit refill requests through [employer] or call your pharmacy and they will forward the refill request to us. Please allow 3 business days for your refill to be completed.          Additional Information About Your Visit        [employer] Information     [employer] gives you secure access to your electronic health record. If you see a primary care provider, you can also send messages to your care team and make appointments. If you have questions, please call your primary care clinic.  If you do not have a primary care provider, please call [phone] and they will assist you.        Care EveryWhere ID     This is your Care EveryWhere ID. This could be used by other organizations to access your [place] medical records  [id number]        Your Vitals Were     Pulse Respirations Height Pulse Oximetry BMI (Body Mass Index)       95 16 1.715 m (5' 7.5\") 94% 29.01 kg/m2        Blood Pressure from Last 3 Encounters:   06/27/18 127/76   06/22/18 120/76   02/22/18 106/69 "    Weight from Last 3 Encounters:   06/27/18 85.3 kg (188 lb)   06/22/18 83.9 kg (185 lb)   02/22/18 83.9 kg (185 lb)              We Performed the Following     Nasal Cautery Simple Unilat          Today's Medication Changes          These changes are accurate as of 6/27/18 12:28 PM.  If you have any questions, ask your nurse or doctor.               Start taking these medicines.        Dose/Directions    ipratropium 0.06 % spray   Commonly known as:  ATROVENT   Used for:  Chronic vasomotor rhinitis   Started by:  Ry Espinosa MD        Dose:  2 spray   Spray 2 sprays into both nostrils 4 times daily as needed   Quantity:  1 Box   Refills:  3            Where to get your medicines      These medications were sent to [facility] - [place], [place] - [address] [place] [address]  [address] [place] [address] [address], [facility] [address]     Phone:  [phone]     ipratropium 0.06 % spray                Primary Care Provider Office Phone # Fax #    Georgia Ju Vázquez [phone]       [address] [place] [address]        Equal Access to Services     [facility] AH: Hadii aad ku hadasho Soomaali, waaxda luqadaha, qaybta kaalmada adeegyada, aj orantes haykristinn raheem hanks ah. So [facility] [phone].    ATENCIÓN: Si habla español, tiene a herrera disposición servicios gratuitos de asistencia lingüística. Llame al [phone].    We comply with applicable federal civil rights laws and [place] laws. We do not discriminate on the basis of race, color, national origin, age, disability, sex, sexual orientation, or gender identity.            Thank you!     Thank you for choosing [facility]  for your care. Our goal is always to provide you with excellent care. Hearing back from our patients is one way we can continue to improve our services. Please take a few minutes to complete the written survey that you may receive in the mail after your visit with us. Thank you!              Your Updated Medication List - Protect others around you: Learn how to safely use, store and throw away your medicines at www.disposemymeds.org.          This list is accurate as of 6/27/18 12:28 PM.  Always use your most recent med list.                   Brand Name Dispense Instructions for use Diagnosis    ascorbic acid 1000 MG Tabs tablet      Take 1 tablet by mouth 3 times daily        aspirin 81 MG tablet      Take 1 tablet by mouth daily.        atorvastatin 40 MG tablet    LIPITOR    90 tablet    Take 1 tablet (40 mg) by mouth daily    Hyperlipidemia LDL goal <100       CALCIUM CITRATE + PO      Take 2 tablets by mouth daily        cyanocobalamin 500 MCG Tabs      Take 1 tablet by mouth 2 times daily        cyclobenzaprine 10 MG tablet    FLEXERIL    60 tablet    Take 1 tablet (10 mg) by mouth 2 times daily as needed for muscle spasms    S/P lumbar fusion       ferrous gluconate 324 (38 Fe) MG tablet    FERGON    270 tablet    Take 1 tablet (324 mg) by mouth daily (with breakfast)    Low iron       FOLIC ACID PO      Take 800 mcg by mouth daily        ipratropium 0.06 % spray    ATROVENT    1 Box    Spray 2 sprays into both nostrils 4 times daily as needed    Chronic vasomotor rhinitis       magnesium oxide 400 (241.3 Mg) MG tablet    MAG-OX     Take 1 tablet (400 mg) by mouth daily        methotrexate 2.5 MG tablet     108 tablet    Take 9 tablets (22.5 mg) by mouth once a week    Rheumatoid arthritis involving multiple sites with positive rheumatoid factor (H)       Multi-vitamin Tabs tablet      Take 1 tablet by mouth daily        OMEGA-3 FISH OIL PO      Take 2,000 mg by mouth daily        order for [facility]      Respironics REMSTAR 60 Series Auto CPAP 12-15 cm H2O, Wisp nasal mask w/a s/m cushion        pantoprazole 40 MG EC tablet    PROTONIX    90 tablet    TAKE ONE TABLET BY MOUTH EVERY DAY 30-60 MINUTES BEFORE A MEAL    Gastroesophageal reflux disease without esophagitis       PREDNISONE PO      Take  20 mg by mouth as needed        tofacitinib 11 MG 24 hr tablet    XELJANZ XR    90 tablet    Take 1 tablet (11 mg) by mouth daily    Rheumatoid arthritis involving multiple sites with positive rheumatoid factor (H)       TYLENOL 500 MG tablet   Generic drug:  acetaminophen     100 tablet    Take 2 tablets (1,000 mg) by mouth every 8 hours as needed for pain        vitamin B complex with vitamin C Tabs tablet      Take 1 tablet by mouth daily.        VITAMIN D-3 PO      Take 1,000 Units by mouth 2 times daily        VITAMIN E PO      Take 2,000 Units by mouth daily           Unable to offer, due to mother's clinical indication

## 2020-01-14 NOTE — PROGRESS NOTES
"If appropriate patient is due for CSA looks like there is not one on file and patient is due for urine drug screen.     Subjective     Ginger Marshall is a 79 year old female who presents to clinic today for the following health issues:    Patient Instructions on Last Visit 11/25/2019:  Patient Instructions   - Start oxycodone 1 tablet (5 mg) by mouth every 6 hours as needed for pain.  - You can continue to take Tylenol.  - You can also try lidocaine patches for pain.  - You can try rib binder. Will take about 6 weeks for your fractured ribs to heal. She has trouble getting her chiropractor to call her back.       HPI   Hyperlipidemia   Her cholesterol looks good non-fasting, so she's wondering if she can stop taking the medication. \"My brain needs fat. I'm a [place]!.\" She has been told by multiple people that cholesterol medications are bad for you.    Muscular pain  Her right thigh muscle \"feels like it has to rip. Like a fire, ripping pain.\" She has had this problem for about 1 year. It flares up when she's laying on that side in bed. It wakes her up in the night. She hasn't mentioned before it because she kept forgetting. Her upper back is in severe pain. She has to ice it every day. She suspects it's because she had surgery on her low back. She had an injection on her upper back, which was ineffective. She just wants something to help with the pain ASAP.    Rheumatoid arthritis  Her rheumatoid arthritis pain is bad right now.    GERD  She gets flare-ups in the day, and she takes TUMS to make them go away. She denies food getting stuck. She drinks 8 cups of coffee in the morning. She takes pantoprazole every day.        Patient Active Problem List   Diagnosis     RA (Rheumatoid Arthritis) off Plaquenil     Menopause     History of colonic polyps     Mitral Regurgitation     Multinodular goiter     CARDIOVASCULAR SCREENING; LDL GOAL LESS THAN 130     Psychophysiologic insomnia     Pulmonary nodule     " PSEUDOPHAKIA OU     PVD (POSTERIOR VITREOUS DETACHMENT) OU     PXF (PSEUDOEXFOLIATION OF LENS CAPSULE) OD     GERD (gastroesophageal reflux disease)     Hyperlipidemia LDL goal <100     Advance Care Planning     Neuropathy     SHERRY (obstructive sleep apnea)-severe (AHI 35)     zEncounter for counseling     Anemia of chronic disease     Migraine     Osteoporosis     Cornea guttata, ou     Conjunctival concretions     Stenosis, spinal, lumbar     Other chronic pain     Obesity     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     MGD (meibomian gland dysfunction)     Blepharitis of both eyes     Personal history of healed osteoporosis fracture     Iron malabsorption     Hyperglycemia     Chronic bilateral low back pain without sciatica     Allergic state, subsequent encounter     BMI 32.0-32.9,adult     Spinal stenosis of lumbar region without neurogenic claudication     Herniated nucleus pulposus, L3-4     S/P lumbar fusion     Anxiety     Low iron     BMI 28.0-28.9,adult     History of depression     DDD (degenerative disc disease), lumbar     Closed fracture of multiple ribs of left side, initial encounter     Past Surgical History:   Procedure Laterality Date     ABDOMEN SURGERY      c-sections     ARTHROSCOPY KNEE RT/LT  01/06    left     BACK SURGERY  2013    disc     BREAST BIOPSY, RT/LT      left benign     BREAST SURGERY  90's    lumpectomy     C ANESTH,TOTAL HIP ARTHROPLASTY  2010    Rt hip     C HAND/FINGER SURGERY UNLISTED  11/05    right hand     C NONSPECIFIC PROCEDURE  91    left foot surgery     C NONSPECIFIC PROCEDURE  95    R MCP surgery     C TOTAL KNEE ARTHROPLASTY  2006    left     C/SECTION, LOW TRANSVERSE  66,72    x 2     CATARACT IOL, RT/LT       COLONOSCOPY  2006,2009     EYE SURGERY      cataracs     HC ESOPH/GAS REFLUX TEST W NASAL IMPED >1 HR  2/1/2012    Procedure:ESOPHAGEAL IMPEDENCE FUNCTION TEST WITH 24 HOUR PH GREATER THAN 1 HOUR; Surgeon:KAYKAY JULIO; Location: GI     IR  CAUDAL EPIDURAL INJECTION SINGLE  2012    LESI L5-S1 at [facility]     OPTICAL TRACKING SYSTEM FUSION POSTERIOR SPINE LUMBAR N/A 4/3/2017    Procedure: OPTICAL TRACKING SYSTEM FUSION SPINE POSTERIOR LUMBAR ONE LEVEL;  Surgeon: Anthony Michele MD;  Location: [facility] OR     SURGICAL HISTORY OF -       right knee total replacement       Social History     Tobacco Use     Smoking status: Former Smoker     Packs/day: 1.00     Years: 41.00     Pack years: 41.00     Types: Cigarettes     Last attempt to quit: 1999     Years since quittin.0     Smokeless tobacco: Never Used     Tobacco comment: former smoker   Substance Use Topics     Alcohol use: Never     Alcohol/week: 0.0 standard drinks     Frequency: Never     Family History   Problem Relation Age of Onset     Arthritis Mother      Alzheimer Disease Mother      Hyperlipidemia Mother      Osteoporosis Mother      Heart Disease Father         MI ( from this)     Alcohol/Drug Father      Arthritis Sister      Hypertension Sister      Cancer Son      Diabetes Son      Neurologic Disorder Sister         Schizophrenic     Hypertension Sister      Hyperlipidemia Sister      Mental Illness Sister      Diabetes Son      Other Cancer Son      Glaucoma Daughter          Current Outpatient Medications   Medication Sig Dispense Refill     acetaminophen (TYLENOL) 500 MG tablet Take 2 tablets (1,000 mg) by mouth every 8 hours as needed for pain 100 tablet 0     atorvastatin (LIPITOR) 40 MG tablet TAKE ONE TABLET BY MOUTH EVERY DAY 90 tablet 2     Cholecalciferol (VITAMIN D-3 PO) Take 1,000 Units by mouth 2 times daily       Coenzyme Q10 (CO Q 10 PO)        desvenlafaxine (PRISTIQ) 50 MG 24 hr tablet Take 1 tablet (50 mg) by mouth daily 90 tablet 3     FOLIC ACID PO Take 800 mcg by mouth daily       ibuprofen (ADVIL) 200 MG tablet Take 200 mg by mouth every 4 hours as needed for mild pain       ipratropium (ATROVENT) 0.06 % nasal spray Spray 2 sprays into both  "nostrils 4 times daily as needed for rhinitis 1 Box 3     Methotrexate, PF, (RASUVO) 25 MG/0.5ML autoinjector Inject 0.5 mLs (25 mg) Subcutaneous every 7 days . Hold for signs of infection, and seek medical attention. 2 mL 6     multivitamin, therapeutic with minerals (MULTI-VITAMIN) TABS Take 1 tablet by mouth daily       pantoprazole (PROTONIX) 40 MG EC tablet TAKE ONE TABLET BY MOUTH EVERY DAY 30-60 MINUTES BEFORE A MEAL 90 tablet 3     sucralfate (CARAFATE) 1 GM tablet Take 1 tablet (1 g) by mouth 4 times daily as needed (heartburn) 120 tablet 1     tofacitinib (XELJANZ XR) 11 MG 24 hr tablet Take 1 tablet (11 mg) by mouth daily 90 tablet 3     topiramate (TOPAMAX) 50 MG tablet Take 1 tablet (50 mg) by mouth 2 times daily 180 tablet 11     triamcinolone (KENALOG) 0.1 % external cream Apply topically 2 times daily as needed for irritation 30 g 1     Allergies   Allergen Reactions     Abatacept Other (See Comments)     Severe headaches  Migraine     Celebrex [Celecoxib]      Ineffective     Celecoxib Unknown and Nausea     Ethanol      Antihistamines     Orencia [Abatacept]      Headache     Septra [Bactrim]      Sulfa Drugs Nausea and Vomiting     \"deathly ill\"  Allergic to everything with Sulfa in it.     Sulfamethoxazole-Trimethoprim Nausea and Nausea and Vomiting     Tramadol Other (See Comments)     Headache  Migraine headache     Valdecoxib Unknown and Nausea     Made me \"very very ill\", might of been \"cramping\"     Adhesive Tape Rash     Plastic tape  Plastic tapes     Antihistamines, Chlorpheniramine-Type  [Alkylamines] Anxiety and Other (See Comments)       Reviewed and updated as needed this visit by Provider  Tobacco  Allergies  Meds  Problems  Med Hx  Surg Hx  Fam Hx        This document serves as a record of the services and decisions personally performed and made by Dr. Vázquez. It was created on her behalf by Alisha Lopes, a trained medical scribe. The creation of this document is based the " "provider's statements to the medical scribe.  Alisha Lopes,  12:45 PM       Review of Systems   ROS COMP: Constitutional, HEENT, cardiovascular, pulmonary, GI, , musculoskeletal, neuro, skin, endocrine and psych systems are negative, except as otherwise noted.      Objective    /68   Pulse 92   Temp 97.7  F (36.5  C) (Oral)   Resp 16   Ht 1.702 m (5' 7\")   Wt 91.7 kg (202 lb 3.2 oz)   SpO2 96%   BMI 31.67 kg/m    Body mass index is 31.67 kg/m .     Physical Exam   GENERAL: healthy, alert and no distress  NECK: no adenopathy, no asymmetry, masses, or scars and thyroid normal to palpation  RESP: lungs clear to auscultation - no rales, rhonchi or wheezes  CV: regular rate and rhythm, normal S1 S2, no S3 or S4, no murmur, click or rub, no peripheral edema and peripheral pulses strong  ABDOMEN: soft, nontender, no hepatosplenomegaly, no masses and bowel sounds normal  MS: no gross musculoskeletal defects noted, no edema; No pain to palpation with the thoracic spine or the paraspinous thoracic muscles. Mild pain to palpation over the trochanteric bursa on the right side. Full ROM of right hip.  SKIN: no suspicious lesions or rashes to visible skin   PSYCH: mentation appears normal, affect normal/bright          Assessment & Plan     1. Chronic midline thoracic back pain  Gradual worsening- had MRI 14 months ago.  Patient would like to be evaluated for a procedure and if not deemed appropriate then agrees to comprehensive eval   - PAIN MANAGEMENT REFERRAL    2. Gastroesophageal reflux disease without esophagitis  Well controlled with medications without side effects.   - pantoprazole (PROTONIX) 40 MG EC tablet; TAKE ONE TABLET BY MOUTH EVERY DAY 30-60 MINUTES BEFORE A MEAL  Dispense: 90 tablet; Refill: 3  - sucralfate (CARAFATE) 1 GM tablet; Take 1 tablet (1 g) by mouth 4 times daily as needed (heartburn)  Dispense: 120 tablet; Refill: 1    3. Hip pain, right  Bursitis or IT band inflammation suspected, " but exam findings do not exhibit these symptoms so will get xray to rule out occult fracture and if negative then proceed to physical therapy   - XR Hip Right 2-3 Views  - [facility] PT, HAND, AND CHIROPRACTIC REFERRAL; Future    4. Rheumatoid arthritis involving multiple sites with positive rheumatoid factor (H)  Worsening- sees rheumatology but is resistant to being on different medication       Patient Instructions   If Carafate and reducing caffeine don't work, then an upper endoscopy is the next step.    The pain clinic will call you for a procedure evaluation for the back.    Make an appointment with physical therapy for your hip.  I suspect this is either trochanteric bursitis or IT band syndrome.   I spent 27 minutes of time with the patient and >50% of it was in education and counseling regarding health maintenance and medication recheck.    Georgia Vázquez MD  -[facility]

## 2020-01-15 ENCOUNTER — TELEPHONE (OUTPATIENT)
Dept: PALLIATIVE MEDICINE | Facility: CLINIC | Age: 80
End: 2020-01-15

## 2020-01-15 ENCOUNTER — ANCILLARY PROCEDURE (OUTPATIENT)
Dept: GENERAL RADIOLOGY | Facility: CLINIC | Age: 80
End: 2020-01-15
Attending: INTERNAL MEDICINE
Payer: COMMERCIAL

## 2020-01-15 ENCOUNTER — OFFICE VISIT (OUTPATIENT)
Dept: INTERNAL MEDICINE | Facility: CLINIC | Age: 80
End: 2020-01-15
Payer: COMMERCIAL

## 2020-01-15 VITALS
DIASTOLIC BLOOD PRESSURE: 68 MMHG | TEMPERATURE: 97.7 F | HEIGHT: 67 IN | HEART RATE: 92 BPM | RESPIRATION RATE: 16 BRPM | SYSTOLIC BLOOD PRESSURE: 122 MMHG | WEIGHT: 202.2 LBS | BODY MASS INDEX: 31.74 KG/M2 | OXYGEN SATURATION: 96 %

## 2020-01-15 DIAGNOSIS — K21.9 GASTROESOPHAGEAL REFLUX DISEASE WITHOUT ESOPHAGITIS: ICD-10-CM

## 2020-01-15 DIAGNOSIS — M54.6 CHRONIC MIDLINE THORACIC BACK PAIN: Primary | ICD-10-CM

## 2020-01-15 DIAGNOSIS — M25.551 HIP PAIN, RIGHT: ICD-10-CM

## 2020-01-15 DIAGNOSIS — M05.79 RHEUMATOID ARTHRITIS INVOLVING MULTIPLE SITES WITH POSITIVE RHEUMATOID FACTOR (H): ICD-10-CM

## 2020-01-15 DIAGNOSIS — G89.29 CHRONIC MIDLINE THORACIC BACK PAIN: Primary | ICD-10-CM

## 2020-01-15 PROCEDURE — 99214 OFFICE O/P EST MOD 30 MIN: CPT | Performed by: INTERNAL MEDICINE

## 2020-01-15 PROCEDURE — 73502 X-RAY EXAM HIP UNI 2-3 VIEWS: CPT

## 2020-01-15 RX ORDER — SUCRALFATE 1 G/1
1 TABLET ORAL 4 TIMES DAILY PRN
Qty: 120 TABLET | Refills: 1 | Status: SHIPPED | OUTPATIENT
Start: 2020-01-15 | End: 2020-03-06

## 2020-01-15 RX ORDER — PANTOPRAZOLE SODIUM 40 MG/1
TABLET, DELAYED RELEASE ORAL
Qty: 90 TABLET | Refills: 3 | Status: SHIPPED | OUTPATIENT
Start: 2020-01-15 | End: 2021-01-13

## 2020-01-15 ASSESSMENT — MIFFLIN-ST. JEOR: SCORE: 1434.8

## 2020-01-15 NOTE — PATIENT INSTRUCTIONS
If Carafate and reducing caffeine don't work, then an upper endoscopy is the next step.    The pain clinic will call you for a procedure evaluation for the back.    Make an appointment with physical therapy for your hip.  I suspect this is either trochanteric bursitis or IT band syndrome.

## 2020-01-15 NOTE — TELEPHONE ENCOUNTER
Pre-screening Questions for Radiology Injections:    Injection to be done at which interventional clinic site? [facility] [place]    Instruct patient to arrive as directed prior to the scheduled appointment time:    [place] minutes before      [place]: 30 minutes before; if IV needed 1 hour before     Procedure ordered by Shania Loera    Procedure ordered? Thoracic epidural steroid injection      Transforaminal Cervical OLIVIA - Dr. Mimi Howard ONLY    What insurance would patient like us to bill for this procedure? [employer]      Worker's comp or MVA (motor vehicle accident) -Any injection DO NOT SCHEDULE and route to Roseanne Agarwal.      [employer] insurance - For SI joint injections, DO NOT SCHEDULE and route Roseanne Agarwal.       [employer] - Any injection besides hip/shoulder/knee joint DO NOT SCHEDULE and route to Roseanne Agarwal. She will obtain PA and call pt back to schedule procedure or notify pt of denial.       [employer]-Route to [place] for review      **BCBS- ALL need to be routed to [place] for review if a PA is needed**      IF SCHEDULING IN [place] AND NEEDS A PA, IT IS OKAY TO SCHEDULE. [place] HANDLES THEIR OWN PA'S AFTER THE PATIENT IS SCHEDULED. PLEASE SCHEDULE AT LEAST 1 WEEK OUT SO A PA CAN BE OBTAINED.    Any chance of pregnancy? NO   If YES, do NOT schedule and route to RN pool    Is an  needed? No     Patient has a drive home? (mandatory) YES: Informed    Is patient taking any blood thinners (i.e. plavix, coumadin, jantoven, warfarin, heparin, pradaxa or dabigatran, etc)? No   If hold needed, do NOT schedule, route to RN pool     Is patient taking any aspirin products (includes Excedrin and Fiorinal)? No     If more than 325mg/day do NOT schedule; route to RN pool     For CERVICAL procedures, hold all aspirin products for 6 days.     Tell pt that if aspirin product is not held for 6 days, the procedure WILL BE cancelled.      Does the patient have a bleeding or clotting  disorder? No     If YES, okay to schedule AND route to RN nurse pool    For any patients with platelet count <100, must be forwarded to provider    Is patient diabetic?  No  If YES, instruct them to bring their glucometer.    Does patient have an active infection or treated for one within the past week? No     Is patient currently taking any antibiotics?  No     For patients on chronic, preventative, or prophylactic antibiotics, procedures may be scheduled.     For patients on antibiotics for active or recent infection:antibiotic course must have been completed for 4 days    Is patient currently taking any steroid medications? (i.e. Prednisone, Medrol)  No     For patients on steroid medications, course must have been completed for 4 days    Reviewed with patient:  If you are started on any steroids or antibiotics between now and your appointment, you must contact us because the procedure may need to be cancelled.  Yes    Is patient actively being treated for cancer or immunocompromised? No  If YES, do NOT schedule and route to RN pool     Are you able to get on and off an exam table with minimal or no assistance? Yes  If NO, do NOT schedule and route to RN pool    Are you able to roll over and lay on your stomach with minimal or no assistance? Yes  If NO, do NOT schedule and route to RN pool     Any allergies to contrast dye, iodine, shellfish, or numbing and steroid medications? No  If YES, route to RN pool AND add allergy information to appointment notes    Allergies: Abatacept; Celebrex [celecoxib]; Celecoxib; Ethanol; Orencia [abatacept]; Septra [bactrim]; Sulfa drugs; Sulfamethoxazole-trimethoprim; Tramadol; Valdecoxib; Adhesive tape; and Antihistamines, chlorpheniramine-type  [alkylamines]      Has the patient had a flu shot or any other vaccinations within 7 days before or after the procedure.  No     Does patient have an MRI/CT?  YES: 2018  Check Procedure Scheduling Grid to see if required.      Was the  MRI done within the last 3 years?  Yes    If yes, [facility], [facility], [facility] etc? [facility]      If no, do not schedule and route to RN pool    If MRI was not done at [facility], [facility] or [facility] do NOT schedule and route to RN pool.      If pt has an imaging disc, the injection MAY be scheduled but pt has to bring disc to appt.     If they show up without the disc the injection cannot be done    Reminders (please tell patient if applicable):       Instructed pt to arrive 30 minutes early for IV start if required. (Check Procedure Scheduling Grid)  Not Applicable      If celiac plexus block, informed patient NPO for 6 hours and that it is okay to take medications with sips of water, especially blood pressure medications  Not Applicable         If this is for a cervical procedure, informed patient that aspirin needs to be held for 6 days.   Not Applicable      For all patients not having spinal cord stimulator (SCS) trials or radiofrequency ablations (RFAs), informed patient:    IV sedation is not provided for this procedure.  If you feel that an oral anti-anxiety medication is needed, you can discuss this further with your referring provider or primary care provider.  The Pain Clinic provider will discuss specifics of what the procedure includes at your appointment.  Most procedures last 10-20 minutes.  We use numbing medications to help with any discomfort during the procedure.  Not Applicable      Do not schedule procedures requiring IV placement in the first appointment of the day or first appointment after lunch. Do NOT schedule at 0745, 0815 or 1245.       For patients 85 or older we recommend having an adult stay w/ them for the remainder of the day.       Does the patient have any questions?  NO  Ayde Najera  [facility]

## 2020-01-15 NOTE — TELEPHONE ENCOUNTER
Order received from Georgia Vázquez MD    Procedure Order TBD by pain provider - Location: thoracic pain - MRI in 2018 - document symptoms in note    Routing to review, before scheduling.    Ayde ACEVES    [facility]

## 2020-01-20 NOTE — PROGRESS NOTES
[facility] - Procedure Note    Date of Visit: 1/23/2020    Procedure performed: T10-T11 interlaminar epidural steroid injection with fluoroscopic guidance  Diagnosis: Thoracic spondylosis; Thoracic radiculitis/radiculopathy  : Domonique Renteria MD & Irene Pino MD (pain fellow)   Anesthesia: none    Indications: Ginger Marshall is a 79 year old female who is seen at the request of her PCP, Georgia Vázquez,  for thoracic epidural steroid injection. The patient describes mid thoracic pain (between shoulder blades) that started 3 years ago that is a constant ache. Worse with standing. Better with sitting and laying down bending forward. NO associated numbness/tingling/weakness, no loss bowel/bladder control, saddle anesthesia. The patient has been exhibiting symptoms consistent with thoracic intraspinal inflammation and radiculopathy. Symptoms have been persistent, disabling, and intermittently severe. The patient reports minimal improvement with conservative treatment, including ice, biofreeze which helps. No medications, therapy, injections.    Thoracic MRI was done on 11/20/2018 which showed   FINDINGS: Alignment is maintained. Bone marrow is significant for mild  discogenic degenerative endplate edema, most conspicuous within the  mid thoracic spine at T5-T6 and T6-T7. No concerning marrow signal  abnormality is identified. Multilevel degenerative disc disease is  present with multiple small disc protrusions which result in mild  spinal canal stenosis (T6-T7, T10-T11). The largest disc protrusion at  T6-T7 results in indentation of the anterior aspect of the thoracic  cord bilaterally. A disc extrusion is present at T8-T9 which measures  approximately 11 x 4 x 14 mm (TR x AP x SI). No high-grade spinal  canal stenosis. No foraminal stenosis. Paraspinal soft tissues are  unremarkable.                                                                    IMPRESSION: Multilevel mild  "degenerative disc disease throughout the  thoracic spine including disc protrusions and disc extrusion at T9.  These result in mild spinal canal stenosis.    Allergies:      Allergies   Allergen Reactions     Abatacept Other (See Comments)     Severe headaches  Migraine     Celebrex [Celecoxib]      Ineffective     Celecoxib Unknown and Nausea     Ethanol      Antihistamines     Orencia [Abatacept]      Headache     Septra [Bactrim]      Sulfa Drugs Nausea and Vomiting     \"deathly ill\"  Allergic to everything with Sulfa in it.     Sulfamethoxazole-Trimethoprim Nausea and Nausea and Vomiting     Tramadol Other (See Comments)     Headache  Migraine headache     Valdecoxib Unknown and Nausea     Made me \"very very ill\", might of been \"cramping\"     Adhesive Tape Rash     Plastic tape  Plastic tapes     Antihistamines, Chlorpheniramine-Type  [Alkylamines] Anxiety and Other (See Comments)        Vitals:  /76   Pulse 83   Resp 16   SpO2 97%     Review of Systems: The patient denies recent fever, chills, illness, use of antibiotics or anticoagulants. All other 10-point review of systems negative.     Procedure: The procedure and risks were explained, and informed written consent was obtained from the patient. Risks include but are not limited to: infection, bleeding, increased pain, and damage to soft tissue, nerve, muscle, and vasculature structures. After getting informed consent, patient was brought into the procedure suite and was placed in a prone position on the procedure table. A Pause for the Cause was performed. Patient was prepped and draped in sterile fashion.     The T10-T11 interspace was identified with use of fluoroscopy in AP view. A 25-gauge, 1.5 inch needle was used to anesthetize the skin and subcutaneous tissue entry site with a total of 2 ml of 1% lidocaine. Under fluoroscopic visualization, a 22-gauge, 3.5 inch Tuohy epidural needle was slowly advanced towards the epidural space a few " millimeters left of midline. The latter part of the needle advancement was guided with fluoroscopy in the lateral view. The epidural space was identified using loss of resistance technique. After negative aspiration for heme and cerebrospinal fluid, a total of 1 mL of non-ionic contrast was injected to confirm needle placement. 9 mL of contrast was wasted. Epidurogram confirmed spread within the posterior epidural space. 2 ml of 40mg/ml of triamcinolone and 1 ml of preservative free saline was injected. The needle was removed.  Images were saved to PACS.    The patient tolerated the procedure well, and there was no evidence of procedural complications. No new sensory or motor deficits were noted following the procedure. The patient was stable and able to ambulate on discharge home. Post-procedure instructions were provided.     Pre-procedure pain score: 4/10 in the back  Post-procedure pain score: 0/10 in the back    Assessment/Plan: Ginger Marshall is a 79 year old female s/p thoracic interlaminar epidural steroid injection today for thoracic spondylosis and radiculitis/radiculopathy.     1. Following today's procedure, the patient was advised to contact the Pain Management Center for any of the following:   Fever, chills, or night sweats   New onset of pain, numbness, or weakness   Any questions/concerns regarding the procedure  If unable to contact the Pain Center, the patient was instructed to go to a local Emergency Room for any complications.   2. The patient will receive a follow-up call in 1 week.   3. Follow-up with your PCP in 2 weeks for post-procedure evaluation.      JAJA HOPE MD   Pain Management & Addiction Medicine

## 2020-01-23 ENCOUNTER — ANCILLARY PROCEDURE (OUTPATIENT)
Dept: RADIOLOGY | Facility: CLINIC | Age: 80
End: 2020-01-23
Attending: ANESTHESIOLOGY
Payer: COMMERCIAL

## 2020-01-23 ENCOUNTER — RADIOLOGY INJECTION OFFICE VISIT (OUTPATIENT)
Dept: PALLIATIVE MEDICINE | Facility: CLINIC | Age: 80
End: 2020-01-23
Payer: COMMERCIAL

## 2020-01-23 VITALS
DIASTOLIC BLOOD PRESSURE: 76 MMHG | RESPIRATION RATE: 16 BRPM | HEART RATE: 83 BPM | OXYGEN SATURATION: 97 % | SYSTOLIC BLOOD PRESSURE: 128 MMHG

## 2020-01-23 DIAGNOSIS — M47.24 OSTEOARTHRITIS OF SPINE WITH RADICULOPATHY, THORACIC REGION: Primary | ICD-10-CM

## 2020-01-23 DIAGNOSIS — M54.14 THORACIC RADICULOPATHY: ICD-10-CM

## 2020-01-23 PROCEDURE — 62321 NJX INTERLAMINAR CRV/THRC: CPT | Performed by: ANESTHESIOLOGY

## 2020-01-23 RX ORDER — COVID-19 ANTIGEN TEST
220 KIT MISCELLANEOUS 2 TIMES DAILY WITH MEALS
COMMUNITY
End: 2021-03-18

## 2020-01-23 ASSESSMENT — PAIN SCALES - GENERAL: PAINLEVEL: SEVERE PAIN (6)

## 2020-01-23 NOTE — NURSING NOTE
Discharge Information    IV Discontiued Time:  NA    Amount of Fluid Infused:  NA    Discharge Criteria = When patient returns to baseline or as per MD order    Consciousness:  Pt is fully awake    Circulation:  BP +/- 20% of pre-procedure level    Respiration:  Patient is able to breathe deeply    O2 Sat:  Patient is able to maintain O2 Sat >92% on room air    Activity:  Moves 4 extremities on command    Ambulation:  Patient is able to stand and walk or stand and pivot into wheelchair    Dressing:  Clean/dry or No Dressing    Notes:   Discharge instructions and AVS given to patient    Patient meets criteria for discharge?  YES    Admitted to PCU?  No    Responsible adult present to accompany patient home?  Yes    Signature/Title:    Aydin Duong RN  RN Care Coordinator  [facility]

## 2020-01-23 NOTE — PATIENT INSTRUCTIONS
[facility]   Procedure Discharge Instructions    Today you saw:    Dr. Domonique Renteria    You had an:  Thoracic Epidural steroid injection      Medications used:  Lidocaine  Omnipaque  Ropivicaine   Kenalog            If you were holding your blood thinning medication, please restart taking it: N/A    Do not drive for 6 hours. The effect of the local anesthetic could slow your reflexes.     You may resume your regular activities after 24 hours    Avoid strenuous activity for the first 24 hours    You may shower, however avoid swimming, tub baths or hot tubs for 24 hours following your procedure    You may have a mild to moderate increase in pain for several days following the injection.    It may take up to 14 days for the steroid medication to start working although you may feel the effect as early as a few days after the procedure.       You may use ice packs for 10-15 minutes, 3 to 4 times a day at the injection site for comfort    Do not use heat to painful areas for 6 to 8 hours. This will give the local anesthetic time to wear off and prevent you from accidentally burning your skin.     Unless you have been directed to avoid the use of anti-inflammatory medications (NSAIDS), you may use medications such as ibuprofen, Aleve or Tylenol for pain control if needed.     Possible side effects of steroids that you may experience include flushing, elevated blood pressure, increased appetite, mild headaches and restlessness.  All of these symptoms will get better with time.    If you experience any of the following, call the Pain Clinic during work hours (Mon-Friday 8-4:30 pm) at [phone] or the Provider Line after hours at [phone]:  -Fever over 100 degree F  -Swelling, bleeding, redness, drainage, warmth at the injection site  -Progressive weakness or numbness in your legs   -Unusual new onset of pain that is not improving

## 2020-01-23 NOTE — NURSING NOTE
Pre-procedure Intake    Have you been fasting? NA    If yes, for how long? NA    Are you taking a prescribed blood thinner such as coumadin, Plavix, Xarelto?    No    If yes, when did you take your last dose? NA    Do you take aspirin?  No    If cervical procedure, have you held aspirin for 6 days?   NA    Do you have any allergies to contrast dye, iodine, steroid and/or numbing medications?  NO    Are you currently taking antibiotics or have an active infection?  NO    Have you had a fever/elevated temperature within the past week? NO    Are you currently taking oral steroids? NO    Do you have a ? Yes       Are you pregnant or breastfeeding?  NO    Are the vital signs normal?  Yes      Beth Dale CMA ([facility])

## 2020-01-27 ENCOUNTER — THERAPY VISIT (OUTPATIENT)
Dept: PHYSICAL THERAPY | Facility: CLINIC | Age: 80
End: 2020-01-27
Attending: INTERNAL MEDICINE
Payer: COMMERCIAL

## 2020-01-27 DIAGNOSIS — M25.551 HIP PAIN, RIGHT: ICD-10-CM

## 2020-01-27 PROCEDURE — 97140 MANUAL THERAPY 1/> REGIONS: CPT | Mod: GP | Performed by: PHYSICAL THERAPIST

## 2020-01-27 PROCEDURE — 97161 PT EVAL LOW COMPLEX 20 MIN: CPT | Mod: GP | Performed by: PHYSICAL THERAPIST

## 2020-01-27 PROCEDURE — 97035 APP MDLTY 1+ULTRASOUND EA 15: CPT | Mod: GP | Performed by: PHYSICAL THERAPIST

## 2020-01-27 ASSESSMENT — ACTIVITIES OF DAILY LIVING (ADL)
GOING_UP_1_FLIGHT_OF_STAIRS: MODERATE DIFFICULTY
HOS_ADL_HIGHEST_POTENTIAL_SCORE: 40
GETTING_INTO_AND_OUT_OF_AN_AVERAGE_CAR: MODERATE DIFFICULTY
ROLLING_OVER_IN_BED: UNABLE TO DO
STEPPING_UP_AND_DOWN_CURBS: SLIGHT DIFFICULTY
DEEP_SQUATTING: EXTREME DIFFICULTY
PUTTING_ON_SOCKS_AND_SHOES: MODERATE DIFFICULTY
GETTING_INTO_AND_OUT_OF_A_BATHTUB: UNABLE TO DO
HOS_ADL_ITEM_SCORE_TOTAL: 17
GOING_DOWN_1_FLIGHT_OF_STAIRS: SLIGHT DIFFICULTY
HOS_ADL_COUNT: 10
WALKING_DOWN_STEEP_HILLS: EXTREME DIFFICULTY
WALKING_INITIALLY: NO DIFFICULTY AT ALL
SITTING_FOR_15_MINUTES: NO DIFFICULTY AT ALL
WALKING_UP_STEEP_HILLS: EXTREME DIFFICULTY

## 2020-01-27 NOTE — PROGRESS NOTES
[facility] Initial Evaluation  Subjective:  The history is provided by the patient.   Ginger Marshall being seen for Burning/tearing pain in rt thigh.   Problem began 4/3/2019. Where condition occurred: for unknown reasons.Problem occurred: While sleeping  and reported as 0/10 on pain scale. General health as reported by patient is fair. Pertinent medical history includes:  Anemia, depression, history of fractures, implanted device, migraines/headaches, numbness/tingling, overweight, osteoarthritis, pain at night/rest, rheumatoid arthritis and sleep disorder/apnea.  Medical allergies: other. Other medical allergies details: See my file for this.  Surgeries include:  Orthopedic surgery and other. Other surgery history details: C-sections, LS fusion, 2x WALTER Rt .   Other medications details: See my file for list of meds.   Primary job tasks include:  Prolonged sitting.  Pain is described as burning and other  Pain is worse during the night. Since onset symptoms are unchanged. Special tests:  X-ray.     Patient is Retired.   Barriers include:  None as reported by patient and lives alone.  Red flags:  None as reported by patient.  Type of problem:  Right hip   Condition occurred with:  Insidious onset. This is a chronic condition   Problem details: Rt hip pain, recurring burning in Rt upper thigh..                             Objective:  System                                           Hip Evaluation    Hip Strength:  : 4/5 hip ext, abd bilat                           Hip Special Testing:       Right hip positive for the following special tests:  Adriana'sRight hip negative for the following special tests:  Piriformis; Ahnna or SLR    Hip Palpation:    Left hip tenderness present at:   Greater Trachanter and IT Band  Right hip tenderness present at:  Greater Trachanter; IT Band; PSIS; Abductors; Iliac Crest; Gluteus Medius and Bursa             General     ROS    Assessment/Plan:    Patient is a 79 year  old female with both sides hip complaints.    Patient has the following significant findings with corresponding treatment plan.                Diagnosis 1:  Hip bursitis/ITband syndr Rt hip   Pain -  hot/cold therapy, US, manual therapy, self management and home program  Decreased strength - therapeutic exercise and therapeutic activities  Decreased proprioception - neuro re-education and therapeutic activities  Inflammation - cold therapy and US  Impaired muscle performance - neuro re-education and home program  Decreased function - therapeutic activities and home program    Therapy Evaluation Codes:   1) History comprised of:   Personal factors that impact the plan of care:      Age, Coping style, Overall behavior pattern and Past/current experiences.    Comorbidity factors that impact the plan of care are:      Osteoarthritis, Overweight and Rheumatoid arthritis.     Medications impacting care: Pain.  2) Examination of Body Systems comprised of:   Body structures and functions that impact the plan of care:      Hip.   Activity limitations that impact the plan of care are:      Bathing, Cooking, Dressing, Lifting, Squatting/kneeling, Stairs, Standing, Walking and Sleeping.  3) Clinical presentation characteristics are:   Stable/Uncomplicated.  4) Decision-Making    Low complexity using standardized patient assessment instrument and/or measureable assessment of functional outcome.  Cumulative Therapy Evaluation is: Low complexity.    Previous and current functional limitations:  (See Goal Flow Sheet for this information)    Short term and Long term goals: (See Goal Flow Sheet for this information)     Communication ability:  Patient appears to be able to clearly communicate and understand verbal and written communication and follow directions correctly.  Treatment Explanation - The following has been discussed with the patient:   RX ordered/plan of care  Anticipated outcomes  Possible risks and side effects  This  patient would benefit from PT intervention to resume normal activities.   Rehab potential is good.    Frequency:  2 X week, once daily  Duration:  for 5 weeks  Discharge Plan:  Achieve all LTG.  Independent in home treatment program.  Reach maximal therapeutic benefit.    Please refer to the daily flowsheet for treatment today, total treatment time and time spent performing 1:1 timed codes.

## 2020-01-30 ENCOUNTER — THERAPY VISIT (OUTPATIENT)
Dept: PHYSICAL THERAPY | Facility: CLINIC | Age: 80
End: 2020-01-30
Payer: COMMERCIAL

## 2020-01-30 DIAGNOSIS — M25.551 HIP PAIN, RIGHT: Primary | ICD-10-CM

## 2020-01-30 PROCEDURE — 97035 APP MDLTY 1+ULTRASOUND EA 15: CPT | Mod: GP

## 2020-01-30 PROCEDURE — 97140 MANUAL THERAPY 1/> REGIONS: CPT | Mod: GP

## 2020-01-30 PROCEDURE — 97110 THERAPEUTIC EXERCISES: CPT | Mod: GP

## 2020-02-27 ENCOUNTER — OFFICE VISIT (OUTPATIENT)
Dept: RHEUMATOLOGY | Facility: CLINIC | Age: 80
End: 2020-02-27
Payer: COMMERCIAL

## 2020-02-27 VITALS
BODY MASS INDEX: 31.61 KG/M2 | HEIGHT: 67 IN | WEIGHT: 201.4 LBS | DIASTOLIC BLOOD PRESSURE: 68 MMHG | HEART RATE: 89 BPM | OXYGEN SATURATION: 98 % | SYSTOLIC BLOOD PRESSURE: 124 MMHG

## 2020-02-27 DIAGNOSIS — G89.29 CHRONIC BILATERAL LOW BACK PAIN WITH LEFT-SIDED SCIATICA: ICD-10-CM

## 2020-02-27 DIAGNOSIS — M70.61 GREATER TROCHANTERIC BURSITIS OF RIGHT HIP: ICD-10-CM

## 2020-02-27 DIAGNOSIS — M05.79 RHEUMATOID ARTHRITIS INVOLVING MULTIPLE SITES WITH POSITIVE RHEUMATOID FACTOR (H): Primary | ICD-10-CM

## 2020-02-27 DIAGNOSIS — M54.42 CHRONIC BILATERAL LOW BACK PAIN WITH LEFT-SIDED SCIATICA: ICD-10-CM

## 2020-02-27 DIAGNOSIS — Z79.899 HIGH RISK MEDICATION USE: ICD-10-CM

## 2020-02-27 LAB
ALBUMIN SERPL-MCNC: 3.6 G/DL (ref 3.4–5)
ALP SERPL-CCNC: 47 U/L (ref 40–150)
ALT SERPL W P-5'-P-CCNC: 19 U/L (ref 0–50)
AST SERPL W P-5'-P-CCNC: 18 U/L (ref 0–45)
BASOPHILS # BLD AUTO: 0 10E9/L (ref 0–0.2)
BASOPHILS NFR BLD AUTO: 0.2 %
BILIRUB DIRECT SERPL-MCNC: 0.1 MG/DL (ref 0–0.2)
BILIRUB SERPL-MCNC: 0.3 MG/DL (ref 0.2–1.3)
CREAT SERPL-MCNC: 0.66 MG/DL (ref 0.52–1.04)
CRP SERPL-MCNC: <2.9 MG/L (ref 0–8)
DIFFERENTIAL METHOD BLD: ABNORMAL
EOSINOPHIL # BLD AUTO: 0.4 10E9/L (ref 0–0.7)
EOSINOPHIL NFR BLD AUTO: 5.7 %
ERYTHROCYTE [DISTWIDTH] IN BLOOD BY AUTOMATED COUNT: 15 % (ref 10–15)
ERYTHROCYTE [SEDIMENTATION RATE] IN BLOOD BY WESTERGREN METHOD: 37 MM/H (ref 0–30)
GFR SERPL CREATININE-BSD FRML MDRD: 85 ML/MIN/{1.73_M2}
HCT VFR BLD AUTO: 36.8 % (ref 35–47)
HGB BLD-MCNC: 11.5 G/DL (ref 11.7–15.7)
LYMPHOCYTES # BLD AUTO: 1.2 10E9/L (ref 0.8–5.3)
LYMPHOCYTES NFR BLD AUTO: 18.7 %
MCH RBC QN AUTO: 30.5 PG (ref 26.5–33)
MCHC RBC AUTO-ENTMCNC: 31.3 G/DL (ref 31.5–36.5)
MCV RBC AUTO: 98 FL (ref 78–100)
MONOCYTES # BLD AUTO: 1.1 10E9/L (ref 0–1.3)
MONOCYTES NFR BLD AUTO: 17.1 %
NEUTROPHILS # BLD AUTO: 3.7 10E9/L (ref 1.6–8.3)
NEUTROPHILS NFR BLD AUTO: 58.3 %
PLATELET # BLD AUTO: 385 10E9/L (ref 150–450)
PROT SERPL-MCNC: 7.5 G/DL (ref 6.8–8.8)
RBC # BLD AUTO: 3.77 10E12/L (ref 3.8–5.2)
WBC # BLD AUTO: 6.4 10E9/L (ref 4–11)

## 2020-02-27 PROCEDURE — 86140 C-REACTIVE PROTEIN: CPT | Performed by: INTERNAL MEDICINE

## 2020-02-27 PROCEDURE — 85652 RBC SED RATE AUTOMATED: CPT | Performed by: INTERNAL MEDICINE

## 2020-02-27 PROCEDURE — 99214 OFFICE O/P EST MOD 30 MIN: CPT | Performed by: INTERNAL MEDICINE

## 2020-02-27 PROCEDURE — 80076 HEPATIC FUNCTION PANEL: CPT | Performed by: INTERNAL MEDICINE

## 2020-02-27 PROCEDURE — 82565 ASSAY OF CREATININE: CPT | Performed by: INTERNAL MEDICINE

## 2020-02-27 PROCEDURE — 36415 COLL VENOUS BLD VENIPUNCTURE: CPT | Performed by: INTERNAL MEDICINE

## 2020-02-27 PROCEDURE — 85025 COMPLETE CBC W/AUTO DIFF WBC: CPT | Performed by: INTERNAL MEDICINE

## 2020-02-27 ASSESSMENT — MIFFLIN-ST. JEOR: SCORE: 1431.17

## 2020-02-27 NOTE — PATIENT INSTRUCTIONS
Rheumatology    Dr. Nico Byers       [facility] in [place]   (Monday)  [address]  [place] [address]       [facility] in [place]   (Tuesday)  [address] [place] [address]    [facility] in [place]   (Wed., Thurs., and Friday)  [address] [place] [address]    Phone number: [phone]  Thank you for choosing [facility].  Samantha Morales CMA

## 2020-02-27 NOTE — PROGRESS NOTES
Rheumatology Clinic Visit      Ginger Marshall MRN# [id number]   YOB: 1940 Age: 79 year old      Date of visit: 2/27/20   PCP: Georgia Vázquez MD     Chief Complaint   Patient presents with:  Arthritis: RA. Patient is stiff, left leg she had pain so bad going from hip to feet.    Assessment and Plan   1. Seropositive ( [2009], CCP >100 [2009]; hx of rheumatoid nodule by 6/14/2011 pathology) Erosive Rheumatoid Arthritis: Previously failed remicade (staph infection during therapy, but was immediately after a joint injection), orencia (migraines), HCQ (ineffective).  Sulfa allergy.  Currently on methotrexate 25 mg SQ once weekly, folic acid 800 mcg daily, Xeljanz XR 11mg daily.  She also has prednisone 20 mg daily ×5 days to use as needed for flare. Methotrexate was reduced in the past with worsening symptoms; later increased and changed to SQ for improved bioavailability.  Doing well today  - Continue methotrexate from 25mg SQ once weekly  - Continue folic acid 800mcg daily as noted above (from over-the-counter supplements)  - Continue Xeljanz XR 11mg daily  - Labs today and in 3 months: CBC, Creatinine, Hepatic Panel, ESR, CRP              Rapid 3, cumulative scores                       10/31/2019: 3.8 (MTX 25mg PO wkly, Xeljanz XR 11mg daily)                        07/11/2019: 8.2 (MTX 25mg PO wkly, HCQ 400mg daily, Xeljanz XR 11mg daily)                        03/06/2019: ?    (MTX 25mg PO wkly, HCQ 400mg daily, Xeljanz XR 11mg daily)                       12/12/2018: 7.5 (MTX 25mg wkly, HCQ 400mg daily, Xeljanz XR 11mg daily)                       08/08/2018: 7    (MTX 22.5mg wkly, Xeljanz XR 11mg daily)                       02/07/2018: 4    (MTX 22.5mg wkly, Xeljanz XR 11mg daily)                       11/08/2017: 5    (MTX 22.5mg wkly, Xeljanz XR 11mg daily)                       08/09/2017: 0    (MTX 22.5mg wkly, Xeljanz XR 11mg daily)                      05/10/2017: 5    (MTX 25mg wkly,  Xeljanz XR 11mg daily) RA doing well    2. Right hip pain: Status post WALTER twice in the past. Followed by [facility].     3. Degenerative change of the L-spine that radiates to the left leg; Right trochanteric bursitis: Suspect either right trochanteric bursa altered gait which exacerbated L-spine degenerative symptoms, or the other way around.  Refer to PT; advised seeing neurosurgery again if back pain persists.  Hx of L-spine surgery.     4. Osteoporosis: was previously managed by endocrinology and she received reclast once in 2016.  She does not want to use any osteoporosis medication at this time.  We discussed the tx options; risks of treatment and risks of not treating.  12/12/2018 DEXA ordered by Dr. Vázquez showed osteoporosis.      5. Iron Deficiency Anemia: Managed by PCP.     6. Left 1st toe pain, history: 2 episodes of sudden onset left toe pain followed by diffuse left foot pain that made ambulation difficult.  Also with nodules over both Achilles tendons and the right elbow that are either rheumatoid nodules or tophi.  She reports having history of gout decades ago.  Denies ever being on colchicine or allopurinol.  Discussed colchicine to use if suspected gout flare occurs.    - Colchicine: (MITIGARE) 0.6 MG capsule; Take 2 capsules (1.2 mg) by mouth once for 1 dose at the onset of a gout flare, followed by 1 capsule (0.6mg) 1 hour later.  Dispense: 3 capsule; Refill: 0    7.  Vaccinations: Vaccinations reviewed with Ms. Marshall.   - Influenza: encouraged yearly vaccination ; up to date per patient  - [id number]: up to date  - [id number]: up to date  - Shingrix: Up to date    Ms. Marshall verbalized agreement with and understanding of the rational for the diagnosis and treatment plan.  All questions were answered to best of my ability and the patient's satisfaction. Ms. Marshall was advised to contact the clinic with any questions that may arise after the clinic visit.      Thank you for  involving me in the care of the patient    Return to clinic: 3-4 months    HPI   Ginger Marshall is a 79 year old female with a history of multiple foot surgeries, hand tendon transfers, MCP replacements (most recent being in August 2015), bilateral TKA, right WALTER, left distal radius fracture history, gout, s/p lumbar fusion, osteoporosis and seropositive (RF+, CCP+) erosive rheumatoid arthritis who presents for follow-up of rheumatoid arthritis.      Today, Ms. Marshall reports that she is doing well with regard to rheumatoid arthritis.  However for the past 2 weeks she has had anterior left leg pain at the thigh and lower leg that has already been improving some.  Mild lower back pain.  Also pain over the lateral aspect of the right hip.  She was referred to physical therapy by Dr. Vázquez but she was only able to go once because then she was ill and was not able to go to physical therapy.  She recalls having back surgery in the past.  Morning stiffness for no more than 45 minutes, if present at all.     Denies fevers, chills, nausea, vomiting, constipation, diarrhea. No abdominal pain. No chest pain/pressure, palpitations, or shortness of breath. No nasal or oral sores.   No neck pain. No rash. No LE swelling.     Tobacco: quit in 1999  EtOH: 1 glass of wine every month at most  Drugs: None  Occupation: , retired     ROS   GEN: No fevers, chills  SKIN: No itching, rashes, sores  HEENT:  No oral or nasal ulcers.  CV: No chest pain, pressure, palpitations, or dyspnea on exertion.  PULM: No SOB, wheeze, cough.  GI:  No nausea, vomiting, constipation, diarrhea. No blood in stool. No abdominal pain.  : No blood in urine.  MSK: See HPI.  NEURO: No numbness or tingling  EXT: No LE swelling    Active Problem List     Patient Active Problem List   Diagnosis     RA (Rheumatoid Arthritis) off Plaquenil     Menopause     History of colonic polyps     Mitral Regurgitation     Multinodular goiter      CARDIOVASCULAR SCREENING; LDL GOAL LESS THAN 130     Psychophysiologic insomnia     Pulmonary nodule     PSEUDOPHAKIA OU     PVD (POSTERIOR VITREOUS DETACHMENT) OU     PXF (PSEUDOEXFOLIATION OF LENS CAPSULE) OD     GERD (gastroesophageal reflux disease)     Hyperlipidemia LDL goal <100     Advance Care Planning     Neuropathy     SHERRY (obstructive sleep apnea)-severe (AHI 35)     zEncounter for counseling     Anemia of chronic disease     Migraine     Osteoporosis     Cornea guttata, ou     Conjunctival concretions     Stenosis, spinal, lumbar     Other chronic pain     Obesity     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     MGD (meibomian gland dysfunction)     Blepharitis of both eyes     Personal history of healed osteoporosis fracture     Iron malabsorption     Hyperglycemia     Chronic bilateral low back pain without sciatica     Allergic state, subsequent encounter     BMI 32.0-32.9,adult     Spinal stenosis of lumbar region without neurogenic claudication     Herniated nucleus pulposus, L3-4     S/P lumbar fusion     Anxiety     Low iron     BMI 28.0-28.9,adult     History of depression     DDD (degenerative disc disease), lumbar     Closed fracture of multiple ribs of left side, initial encounter     Past Medical History     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     History of total knee replacement 7/23/2009     Menopause late 40's     Other chronic pain     joints     Pelvic fracture (H) 5/13/2014     PUD (peptic ulcer disease)      Rheumatoid arteritis (H)      Sleep apnea     Uses a CPAP     Vitamin B12 deficiency      Past Surgical History     Past Surgical History:   Procedure Laterality Date     ABDOMEN SURGERY      c-sections     ARTHROSCOPY KNEE RT/LT  01/06    left     BACK SURGERY  2013    disc     BREAST BIOPSY, RT/LT      left benign      "BREAST SURGERY  90's    lumpectomy     C ANESTH,TOTAL HIP ARTHROPLASTY  2010    Rt hip     C HAND/FINGER SURGERY UNLISTED  11/05    right hand     C NONSPECIFIC PROCEDURE  91    left foot surgery     C NONSPECIFIC PROCEDURE  95    R MCP surgery     C TOTAL KNEE ARTHROPLASTY  2006    left     C/SECTION, LOW TRANSVERSE  66,72    x 2     CATARACT IOL, RT/LT       COLONOSCOPY  2006,2009     EYE SURGERY      cataracs     HC ESOPH/GAS REFLUX TEST W NASAL IMPED >1 HR  2/1/2012    Procedure:ESOPHAGEAL IMPEDENCE FUNCTION TEST WITH 24 HOUR PH GREATER THAN 1 HOUR; Surgeon:KAYKAY JULIO; Location:[facility]     IR CAUDAL EPIDURAL INJECTION SINGLE  5/2/2012    LESI L5-S1 at [facility]     OPTICAL TRACKING SYSTEM FUSION POSTERIOR SPINE LUMBAR N/A 4/3/2017    Procedure: OPTICAL TRACKING SYSTEM FUSION SPINE POSTERIOR LUMBAR ONE LEVEL;  Surgeon: Anthony Michele MD;  Location: [facility]     SURGICAL HISTORY OF -   2007    right knee total replacement     Allergy     Allergies   Allergen Reactions     Abatacept Other (See Comments)     Severe headaches  Migraine     Celebrex [Celecoxib]      Ineffective     Celecoxib Unknown and Nausea     Ethanol      Antihistamines     Orencia [Abatacept]      Headache     Septra [Bactrim]      Sulfa Drugs Nausea and Vomiting     \"deathly ill\"  Allergic to everything with Sulfa in it.     Sulfamethoxazole-Trimethoprim Nausea and Nausea and Vomiting     Tramadol Other (See Comments)     Headache  Migraine headache     Valdecoxib Unknown and Nausea     Made me \"very very ill\", might of been \"cramping\"     Adhesive Tape Rash     Plastic tape  Plastic tapes     Antihistamines, Chlorpheniramine-Type  [Alkylamines] Anxiety and Other (See Comments)     Current Medication List     Current Outpatient Medications   Medication Sig     acetaminophen (TYLENOL) 500 MG tablet Take 2 tablets (1,000 mg) by mouth every 8 hours as needed for pain     atorvastatin (LIPITOR) 40 MG tablet TAKE ONE TABLET BY MOUTH " EVERY DAY     Cholecalciferol (VITAMIN D-3 PO) Take 1,000 Units by mouth 2 times daily     Coenzyme Q10 (CO Q 10 PO)      desvenlafaxine (PRISTIQ) 50 MG 24 hr tablet Take 1 tablet (50 mg) by mouth daily     FOLIC ACID PO Take 800 mcg by mouth daily     ibuprofen (ADVIL) 200 MG tablet Take 200 mg by mouth every 4 hours as needed for mild pain     Methotrexate, PF, (RASUVO) 25 MG/0.5ML autoinjector Inject 0.5 mLs (25 mg) Subcutaneous every 7 days . Hold for signs of infection, and seek medical attention.     multivitamin, therapeutic with minerals (MULTI-VITAMIN) TABS Take 1 tablet by mouth daily     naproxen sodium 220 MG capsule Take 220 mg by mouth 2 times daily (with meals)     pantoprazole (PROTONIX) 40 MG EC tablet TAKE ONE TABLET BY MOUTH EVERY DAY 30-60 MINUTES BEFORE A MEAL     sucralfate (CARAFATE) 1 GM tablet Take 1 tablet (1 g) by mouth 4 times daily as needed (heartburn)     tofacitinib (XELJANZ XR) 11 MG 24 hr tablet Take 1 tablet (11 mg) by mouth daily     topiramate (TOPAMAX) 50 MG tablet Take 1 tablet (50 mg) by mouth 2 times daily     triamcinolone (KENALOG) 0.1 % external cream Apply topically 2 times daily as needed for irritation (Patient not taking: Reported on 2020)     No current facility-administered medications for this visit.      Social History   See HPI    Family History     Family History   Problem Relation Age of Onset     Arthritis Mother      Alzheimer Disease Mother      Hyperlipidemia Mother      Osteoporosis Mother      Heart Disease Father         MI ( from this)     Alcohol/Drug Father      Arthritis Sister      Hypertension Sister      Cancer Son      Diabetes Son      Neurologic Disorder Sister         Schizophrenic     Hypertension Sister      Hyperlipidemia Sister      Mental Illness Sister      Diabetes Son      Other Cancer Son      Glaucoma Daughter      No change in family history since the previous clinic visit.    Physical Exam     Temp Readings from Last 3  "Encounters:   01/15/20 97.7  F (36.5  C) (Oral)   11/25/19 96.3  F (35.7  C) (Oral)   10/16/19 97.4  F (36.3  C) (Oral)     BP Readings from Last 5 Encounters:   02/27/20 124/68   01/23/20 128/76   01/15/20 122/68   11/25/19 120/80   10/31/19 108/68     Pulse Readings from Last 1 Encounters:   02/27/20 89     Resp Readings from Last 1 Encounters:   01/23/20 16     Estimated body mass index is 31.54 kg/m  as calculated from the following:    Height as of this encounter: 1.702 m (5' 7\").    Weight as of this encounter: 91.4 kg (201 lb 6.4 oz).    GEN: NAD   HEENT: MMM. No oral lesions. Anicteric, noninjected sclera  CV: S1, S2. RRR. No m/r/g.  PULM: CTA bilaterally. No w/c.  MSK: MCPs and PIPs without synovial swelling or tenderness palpation.  Inability to fully extend the right third PIP.   Subluxation of the bilateral MCPs. Right hand with old surgical scars over the MCPs.  Right wrist nontender to palpation and without swelling, increased warmth, or overlying erythema; surgical scar present. Left wrist without swelling or tenderness to palpation. Elbows, shoulders, knees, ankles, and MTPs without swelling or tenderness palpation.   Hypertrophy at the right lateral midfoot.  Right hip mildly tender to palpation over the greater trochanteric bursa; limited her external range of motion; negative straight leg test.  Left hip with negative straight leg test; range of motion without pain.  Spine nontender to palpation.  SKIN: No rash.  No nail pitting.  EXT: No LE edema  PSYCH: Alert. Appropriate.     Labs     CBC  Recent Labs   Lab Test 12/19/19  0957 09/12/19  1019 08/15/19  1118 06/12/19  0956   WBC 5.6 4.8  --  5.5   RBC 3.96 3.83  --  3.86   HGB 12.0 11.6* 11.7 12.0   HCT 37.7 36.7  --  37.2   MCV 95 96  --  96   RDW 14.7 14.2  --  14.9    348  --  355   MCH 30.3 30.3  --  31.1   MCHC 31.8 31.6  --  32.3   NEUTROPHIL 56.2 57.1  --  60.5   LYMPH 21.4 25.1  --  20.1   MONOCYTE 16.4 11.5  --  13.5   EOSINOPHIL " 5.8 6.1  --  5.7   BASOPHIL 0.2 0.2  --  0.2   ANEU 3.1 2.7  --  3.3   ALYM 1.2 1.2  --  1.1   MARYURI 0.9 0.6  --  0.7   AEOS 0.3 0.3  --  0.3   ABAS 0.0 0.0  --  0.0     CMP  Recent Labs   Lab Test 12/19/19  0957 09/12/19  1019 08/15/19  1118 06/12/19  0956  03/06/19  1351  11/23/18  1309     --  144  --   --   --   --  141   POTASSIUM 3.8  --  3.5  --   --   --   --  3.9   CHLORIDE 111*  --  112*  --   --   --   --  107   CO2 24  --  25  --   --   --   --  29   ANIONGAP 7  --  7  --   --   --   --  5   GLC 88  --  95  --   --   --   --  123*   BUN 10  --  16  --   --   --   --  14   CR 0.68 0.55 0.57 0.64   < > 0.64   < > 0.59   GFRESTIMATED 84 >90 89 86   < > 86   < > >90   GFRESTBLACK >90 >90 >90 >90   < > >90   < > >90   BRANDEE 9.4  --  9.6  --   --  9.3  --  9.2   BILITOTAL 0.3 0.3  --  0.4   < > 0.3   < > 0.3   ALBUMIN 4.0 3.8  --  3.9   < > 4.0   < > 3.9   PROTTOTAL 7.6 7.5  --  7.7   < > 7.6   < > 7.6   ALKPHOS 45 45  --  46   < > 42   < > 55   AST 19 21  --  26   < > 23   < > 29   ALT 22 25  --  32   < > 32   < > 33    < > = values in this interval not displayed.     Calcium/VitaminD  Recent Labs   Lab Test 12/19/19  0957 08/15/19  1118 03/06/19  1351  10/28/16  1239  02/05/13  1050   BRANDEE 9.4 9.6 9.3   < > 9.4   < > 9.3   VITDT  --   --  41  --  37  --  33    < > = values in this interval not displayed.     ESR/CRP  Recent Labs   Lab Test 09/12/19  1019 05/08/19  1012 03/06/19  1351   SED 34* 21 22   CRP <2.9 <2.9 <2.9     Lipid Panel  Recent Labs   Lab Test 12/19/19  0957 11/23/18  1309 12/14/17  0957 10/28/16  1238  01/13/14  1109 07/24/13  1024  04/13/12  0831   CHOL 166  --  164 176   < > 135 177  --  167   TRIG 113  --  121 103   < > 89 80  --  115   HDL 89  --  79 77   < > 49* 89  --  59   LDL 54 72 61 78   < > 68 72   < > 85   VLDL  --   --   --   --   --  18 16  --  23   CHOLHDLRATIO  --   --   --   --   --  2.8 2.0  --  2.8   NHDL 77  --  85 99   < >  --   --   --   --     < > = values in this  "interval not displayed.     Hepatitis B  Recent Labs   Lab Test 09/15/15  1159 04/30/12  1005   AUSAB 0.11  --    HBCAB Nonreactive  --    HEPBANG Nonreactive Negative     Hepatitis C  Recent Labs   Lab Test 09/15/15  1159 04/30/12  1005   HCVAB Nonreactive   Assay performance characteristics have not been established for newborns,   infants, and children   Negative     Tuberculosis Screening  Recent Labs   Lab Test 05/07/18  1033 09/15/15  1200 04/30/12  1006   TBRSLT Negative Negative Negative   TBAGN 0.00 0.00 0.00       \"HAND BILATERAL THREE OR MORE VIEWS 9/15/2015 12:28 PM   HISTORY: Rheumatoid arthritis; establish baseline. Rheumatoid  arthritis(714.0)  COMPARISON: None  IMPRESSION  IMPRESSION: There is diffuse osteopenia. Postoperative changes of the  right second and third metacarpophalangeal joints. Moderate joint  space narrowing involving the left second and third  metacarpophalangeal joints. Mild joint space narrowing of the right  fourth and fifth metacarpophalangeal joints. There are also small  periarticular erosive changes of the metacarpophalangeal joints. There  is fusion of several of the right carpal bones. Marked joint space  loss in the left wrist. Findings are consistent with the clinical  history of rheumatoid arthritis. Chronic fracture deformities of the  right distal radius and ulna. No acute fracture is seen.  SPENCER MONSALVE MD\"    Immunization History     Immunization History   Administered Date(s) Administered     FLU 6-35 months 10/24/2006, 11/14/2007, 10/22/2008, 10/21/2009     Flu, Unspecified 10/20/2013     Influenza (High Dose) 3 valent vaccine 10/28/2013, 09/23/2014, 11/11/2015, 09/23/2016, 09/24/2019     Influenza (IIV3) PF 10/12/1999, 10/26/2001, 10/19/2002, 10/30/2003, 10/28/2004, 10/21/2005, 10/26/2007, 10/21/2009, 10/05/2011, 10/13/2012     Influenza Vaccine Im 4yrs+ 4 Valent CCIIV4 09/28/2017, 09/27/2018     Mantoux Tuberculin Skin Test 11/03/2006     Pneumo Conj 13-V " (2010&after) 07/29/2015     Pneumococcal 23 valent 06/26/2000, 10/26/2001, 06/01/2007, 06/02/2010     TD (ADULT, 7+) 12/10/2008, 07/20/2009     Tdap (Adult) Unspecified Formulation 12/12/2018     Zoster vaccine recombinant adjuvanted (SHINGRIX) 12/12/2018, 02/14/2019          Chart documentation done in part with Dragon Voice recognition Software. Although reviewed after completion, some word and grammatical error may remain.    Nico Byers MD

## 2020-03-05 DIAGNOSIS — K21.9 GASTROESOPHAGEAL REFLUX DISEASE WITHOUT ESOPHAGITIS: ICD-10-CM

## 2020-03-06 RX ORDER — SUCRALFATE 1 G/1
TABLET ORAL
Qty: 120 TABLET | Refills: 1 | Status: SHIPPED | OUTPATIENT
Start: 2020-03-06 | End: 2021-04-27

## 2020-03-06 NOTE — TELEPHONE ENCOUNTER
"Prescription approved per [facility] Refill Protocol.    Requested Prescriptions   Pending Prescriptions Disp Refills     sucralfate (CARAFATE) 1 GM tablet [Pharmacy Med Name: SUCRALFATE 1GM TABS] 120 tablet 1     Sig: TAKE ONE TABLET BY MOUTH FOUR TIMES A DAY AS NEEDED FOR HEARTBURN       Miscellaneous Gastrointestinal Agents Passed - 3/5/2020  9:01 AM        Passed - Recent (12 mo) or future (30 days) visit within the authorizing provider's specialty     Patient has had an office visit with the authorizing provider or a provider within the authorizing providers department within the previous 12 mos or has a future within next 30 days. See \"Patient Info\" tab in inbasket, or \"Choose Columns\" in Meds & Orders section of the refill encounter.              Passed - Medication is active on med list        Passed - Patient is 18 years of age or older        "

## 2020-03-13 ENCOUNTER — THERAPY VISIT (OUTPATIENT)
Dept: PHYSICAL THERAPY | Facility: CLINIC | Age: 80
End: 2020-03-13
Attending: INTERNAL MEDICINE
Payer: COMMERCIAL

## 2020-03-13 DIAGNOSIS — M70.61 GREATER TROCHANTERIC BURSITIS OF RIGHT HIP: ICD-10-CM

## 2020-03-13 DIAGNOSIS — M54.42 CHRONIC BILATERAL LOW BACK PAIN WITH LEFT-SIDED SCIATICA: ICD-10-CM

## 2020-03-13 DIAGNOSIS — G89.29 CHRONIC BILATERAL LOW BACK PAIN WITH LEFT-SIDED SCIATICA: ICD-10-CM

## 2020-03-13 PROCEDURE — 97110 THERAPEUTIC EXERCISES: CPT | Mod: GP | Performed by: PHYSICAL THERAPIST

## 2020-03-13 PROCEDURE — 97161 PT EVAL LOW COMPLEX 20 MIN: CPT | Mod: GP | Performed by: PHYSICAL THERAPIST

## 2020-03-13 NOTE — LETTER
OPAL WASHINGTON PT  [address] [facility]  [address]  [place] [address]  [phone]    2020    Re: Ginger Marshall   :   1940  MRN:  [id number]   REFERRING PHYSICIAN:   md OPAL Huang PT  Date of Initial Evaluation:  3/13/2020  Visits:     Reason for Referral:     Chronic bilateral low back pain with left-sided sciatica  Greater trochanteric bursitis of right hip    EVALUATION SUMMARY    [facility] Initial Evaluation  Subjective:  The history is provided by the patient.   Patient Health History  Ginger Marshall being seen for LBP, hip bursitis .   Problem began: 2020 (ongoing).   Problem occurred: DJD    Pain is reported as 3/10 on pain scale.  General health as reported by patient is good.  Pertinent medical history includes: implanted device, osteoarthritis, anemia, depression, history of fractures, overweight and smoking.   Red flags:  None as reported by patient.     Surgeries include:  Orthopedic surgery. Other surgery history details: bilat TKA, Rt WALTER  LS fusion rods bilat .    Current medications:  Anti-depressants and pain medication.    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   Primary job tasks include:  Prolonged sitting.                Therapist Generated HPI Evaluation  Problem details: Pt reports ongoing Rt hip and LBP. She admits to low energy and low activity. She lives alone in an apt on the [address]  .         Type of problem:  Lumbar.    This is a chronic condition.  Condition occurred with:  Degenerative joint disease.  Where condition occurred: at home.  Patient reports pain:  Upper thoracic spine, lower thoracic spine, mid lumbar spine and lower lumbar spine.  Pain is described as aching and sharp and is constant.  Pain is worse in the A.M..  Since onset symptoms are gradually worsening.  Associated symptoms:  Loss of motion/stiffness and loss of strength. Symptoms are exacerbated by twisting, standing and walking    Special tests  included:  X-ray.    Barriers include:  None as reported by patient.    Objective:  Lumbar/SI Evaluation  ROM:    AROM Lumbar:   Flexion:        Mod loss   Ext:                    Mjr loss    Side Bend:        Left:     Right:   Rotation:           Left:     Right:   Side Glide:        Left:     Right:   Strength: -4/5 hip abd and ext bilat     Lumbar Myotomes:  normal    Lumbar Palpation:    Tenderness present at Left:    Quadratus Lumborum; Erector Spinae; Piriformis; Gluteus Medius and Greater Trochanter  Tenderness present at Right: Quadratus Lumborum; Erector Spinae; Piriformis; Gluteus Medius and Greater Trochanter    Functional Tests:  Core strength and proprioception lumbar: 5' nustep  92 bpm  RHR.  SI joint/Sacrum:    Negative SI tests, symetrical       General Evaluation:  Lower Extremity Strength:    Knee Extension: 4+      Knee Flexion: 4+      Ankle Plantarflexion: 3      Ankle Dorsiflexion: 4+        Assessment/Plan:    Patient is a 79 year old female with lumbar complaints.    Patient has the following significant findings with corresponding treatment plan.                Diagnosis 1:  Lbp/hip bursitis   Pain -  hot/cold therapy, US, manual therapy, self management and home program  Decreased ROM/flexibility - manual therapy, therapeutic exercise and home program  Decreased joint mobility - manual therapy and therapeutic exercise  Decreased strength - therapeutic exercise and therapeutic activities  Decreased proprioception - neuro re-education and therapeutic activities  Inflammation - cold therapy and US  Impaired gait - gait training  Impaired muscle performance - neuro re-education  Decreased function - therapeutic activities    Therapy Evaluation Codes:   1) History comprised of:   Personal factors that impact the plan of care:      Age, Coping style, Overall behavior pattern and Past/current experiences.     Re: Ginger Marshall   :   1940     Comorbidity factors that impact the plan of  care are:      Depression, Implanted device, Osteoarthritis, Overweight, Rheumatoid arthritis and Lumbar DJD .     Medications impacting care: Anti-depressant and Pain.  2) Examination of Body Systems comprised of:   Body structures and functions that impact the plan of care:      Hip and Lumbar spine.   Activity limitations that impact the plan of care are:      Cooking, Dressing, Sitting, Squatting/kneeling, Stairs, Standing and Walking.  3) Clinical presentation characteristics are:   Stable/Uncomplicated.  4) Decision-Making    Low complexity using standardized patient assessment instrument and/or measureable assessment of functional outcome.  Cumulative Therapy Evaluation is: Low complexity.    Previous and current functional limitations:  (See Goal Flow Sheet for this information)    Short term and Long term goals: (See Goal Flow Sheet for this information)     Communication ability:  Patient appears to be able to clearly communicate and understand verbal and written communication and follow directions correctly.  Treatment Explanation - The following has been discussed with the patient:   RX ordered/plan of care  Anticipated outcomes  Possible risks and side effects  This patient would benefit from PT intervention to resume normal activities.   Rehab potential is good.    Frequency:  1 X week, once daily  Duration:  for 12 weeks  Discharge Plan:  Achieve all LTG.  Independent in home treatment program.  Reach maximal therapeutic benefit.    Please refer to the daily flowsheet for treatment today, total treatment time and time spent performing 1:1 timed codes.         Thank you for your referral.    INQUIRIES  Therapist: DEVON Hough PT  [address]  [address]  [place] [address]  Phone: [phone]  Fax: [phone]

## 2020-03-13 NOTE — PROGRESS NOTES
[facility] Initial Evaluation  Subjective:  The history is provided by the patient.   Patient Health History  Ginger Marshall being seen for LBP, hip bursitis .     Problem began: 2/27/2020 (ongoing).   Problem occurred: DJD    Pain is reported as 3/10 on pain scale.  General health as reported by patient is good.  Pertinent medical history includes: implanted device, osteoarthritis, anemia, depression, history of fractures, overweight and smoking.   Red flags:  None as reported by patient.     Surgeries include:  Orthopedic surgery. Other surgery history details: bilat TKA, Rt WALTER  , LS fusion rods bilat .    Current medications:  Anti-depressants and pain medication.    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   Primary job tasks include:  Prolonged sitting.                  Therapist Generated HPI Evaluation  Problem details: Pt reports ongoing Rt hip and LBP. She admits to low energy and low activity. She lives alone in an apt on the [address]  .         Type of problem:  Lumbar.    This is a chronic condition.  Condition occurred with:  Degenerative joint disease.  Where condition occurred: at home.  Patient reports pain:  Upper thoracic spine, lower thoracic spine, mid lumbar spine and lower lumbar spine.  Pain is described as aching and sharp and is constant.  Pain is worse in the A.M..  Since onset symptoms are gradually worsening.  Associated symptoms:  Loss of motion/stiffness and loss of strength. Symptoms are exacerbated by twisting, standing and walking    Special tests included:  X-ray.    Barriers include:  None as reported by patient.                        Objective:  System         Lumbar/SI Evaluation  ROM:    AROM Lumbar:   Flexion:        Mod loss   Ext:                    Mjr loss    Side Bend:        Left:     Right:   Rotation:           Left:     Right:   Side Glide:        Left:     Right:         Strength: -4/5 hip abd and ext bilat   Lumbar Myotomes:   normal                    Lumbar Palpation:    Tenderness present at Left:    Quadratus Lumborum; Erector Spinae; Piriformis; Gluteus Medius and Greater Trochanter  Tenderness present at Right: Quadratus Lumborum; Erector Spinae; Piriformis; Gluteus Medius and Greater Trochanter  Functional Tests:  Core strength and proprioception lumbar: 5' nustep  92 bpm  RHR.            SI joint/Sacrum:    Negative SI tests, symetrical                                                            General Evaluation:        Lower Extremity Strength:    Knee Extension: 4+      Knee Flexion: 4+      Ankle Plantarflexion: 3      Ankle Dorsiflexion: 4+                                                               ROS    Assessment/Plan:    Patient is a 79 year old female with lumbar complaints.    Patient has the following significant findings with corresponding treatment plan.                Diagnosis 1:  Lbp/hip bursitis   Pain -  hot/cold therapy, US, manual therapy, self management and home program  Decreased ROM/flexibility - manual therapy, therapeutic exercise and home program  Decreased joint mobility - manual therapy and therapeutic exercise  Decreased strength - therapeutic exercise and therapeutic activities  Decreased proprioception - neuro re-education and therapeutic activities  Inflammation - cold therapy and US  Impaired gait - gait training  Impaired muscle performance - neuro re-education  Decreased function - therapeutic activities    Therapy Evaluation Codes:   1) History comprised of:   Personal factors that impact the plan of care:      Age, Coping style, Overall behavior pattern and Past/current experiences.    Comorbidity factors that impact the plan of care are:      Depression, Implanted device, Osteoarthritis, Overweight, Rheumatoid arthritis and Lumbar DJD .     Medications impacting care: Anti-depressant and Pain.  2) Examination of Body Systems comprised of:   Body structures and functions that impact the  plan of care:      Hip and Lumbar spine.   Activity limitations that impact the plan of care are:      Cooking, Dressing, Sitting, Squatting/kneeling, Stairs, Standing and Walking.  3) Clinical presentation characteristics are:   Stable/Uncomplicated.  4) Decision-Making    Low complexity using standardized patient assessment instrument and/or measureable assessment of functional outcome.  Cumulative Therapy Evaluation is: Low complexity.    Previous and current functional limitations:  (See Goal Flow Sheet for this information)    Short term and Long term goals: (See Goal Flow Sheet for this information)     Communication ability:  Patient appears to be able to clearly communicate and understand verbal and written communication and follow directions correctly.  Treatment Explanation - The following has been discussed with the patient:   RX ordered/plan of care  Anticipated outcomes  Possible risks and side effects  This patient would benefit from PT intervention to resume normal activities.   Rehab potential is good.    Frequency:  1 X week, once daily  Duration:  for 12 weeks  Discharge Plan:  Achieve all LTG.  Independent in home treatment program.  Reach maximal therapeutic benefit.    Please refer to the daily flowsheet for treatment today, total treatment time and time spent performing 1:1 timed codes.

## 2020-03-15 ENCOUNTER — HEALTH MAINTENANCE LETTER (OUTPATIENT)
Age: 80
End: 2020-03-15

## 2020-03-16 ENCOUNTER — THERAPY VISIT (OUTPATIENT)
Dept: PHYSICAL THERAPY | Facility: CLINIC | Age: 80
End: 2020-03-16
Payer: COMMERCIAL

## 2020-03-16 DIAGNOSIS — M54.50 CHRONIC BILATERAL LOW BACK PAIN WITHOUT SCIATICA: Primary | Chronic | ICD-10-CM

## 2020-03-16 DIAGNOSIS — G89.29 CHRONIC BILATERAL LOW BACK PAIN WITHOUT SCIATICA: Primary | Chronic | ICD-10-CM

## 2020-03-16 PROCEDURE — 97530 THERAPEUTIC ACTIVITIES: CPT | Mod: GP | Performed by: PHYSICAL THERAPIST

## 2020-03-16 PROCEDURE — 97110 THERAPEUTIC EXERCISES: CPT | Mod: GP | Performed by: PHYSICAL THERAPIST

## 2020-03-21 ENCOUNTER — OFFICE VISIT (OUTPATIENT)
Dept: URGENT CARE | Facility: URGENT CARE | Age: 80
End: 2020-03-21
Payer: COMMERCIAL

## 2020-03-21 ENCOUNTER — VIRTUAL VISIT (OUTPATIENT)
Dept: FAMILY MEDICINE | Facility: OTHER | Age: 80
End: 2020-03-21

## 2020-03-21 VITALS
OXYGEN SATURATION: 97 % | TEMPERATURE: 98.7 F | DIASTOLIC BLOOD PRESSURE: 85 MMHG | HEART RATE: 100 BPM | SYSTOLIC BLOOD PRESSURE: 130 MMHG

## 2020-03-21 DIAGNOSIS — R19.7 DIARRHEA, UNSPECIFIED TYPE: Primary | ICD-10-CM

## 2020-03-21 PROCEDURE — 99214 OFFICE O/P EST MOD 30 MIN: CPT | Performed by: FAMILY MEDICINE

## 2020-03-21 NOTE — PROGRESS NOTES
"Date: 2020 11:18:15  Clinician: Carlene Ulloa  Clinician NPI: [id number]  Patient: Ginger Marshall  Patient : 1940  Patient Address: [address] [place] [address]  Patient Phone: [phone]  Visit Protocol: URI  Patient Summary:  Ginger is a 79 year old ( : 1940 ) female who initiated a Visit for COVID-19 (Coronavirus) evaluation and screening. When asked the question \"Please sign me up to receive news, health information and promotions from [employer].\", Ginger responded \"No\".    Ginger states her symptoms started today.   Her symptoms consist of myalgia, a cough, malaise, and chills.   Symptom details   Cough: Ginger coughs every 5-10 minutes and her cough is not more bothersome at night. Phlegm does not come into her throat when she coughs. She does not believe her cough is caused by post-nasal drip.    Ginger denies having ear pain, rhinitis, facial pain or pressure, wheezing, sore throat, nasal congestion, teeth pain, headache, and fever. She also denies taking antibiotic medication for the symptoms and having recent facial or sinus surgery in the past 60 days. She is not experiencing dyspnea.   Precipitating events  She has not recently been exposed to someone with influenza. Ginger has been in close contact with the following high risk individuals: immunocompromised people and adults 65 or older.   Pertinent COVID-19 (Coronavirus) information  Ginger has not traveled internationally or to the areas where COVID-19 (Coronavirus) is widespread, including cruise ship travel in the last 14 days before the start of her symptoms.   Ginger has not had a close contact with a laboratory-confirmed COVID-19 patient within 14 days of symptom onset. She also has not had a close contact with a suspected COVID-19 patient within 14 days of symptom onset.   Ginger is not a healthcare worker and does not work in a healthcare facility.   Pertinent medical history  Ginger does not get yeast infections when she takes " antibiotics.   Ginger does not need a return to work/school note.   Weight: 195 lbs   Ginger does not smoke or use smokeless tobacco.   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 You may walk in to one of our designated urgent care sites below that is prepared to see patients who have symptoms that could indicate Coronavirus (Covid-19).   For your information: At this time we will NOT perform coronavirus testing in urgent care sites. This test is currently being reserved for patients who are being admitted to the hospital.  [place] [address] [place] [address]. Hours: M-F 11am -- 9pm, Sat-Sun 9am-5pm  [place] [address] [place] [address]. Hour: M-F 1pm-9pm, Sat 8am-9pm, Sunday 10am-8pm  [place] [address] [place] [address]. Hours: M-F 7am-7pm, Sat-Sun 8am-4pm.   PLEASE BRING DOCUMENTATION FROM THIS COMPLETED [facility] VISIT TO YOUR URGENT CARE VISIT.     For more information about COVID19 and options for caring for yourself at home, please visit the CDC website at https://www.cdc.gov/coronavirus/2019-ncov/about/steps-when-sick.html  For more options for care at [facility], please visit our website at [email or web address]    COVID-19 (Coronavirus) General Information  With the increase in the number of COVID-19 (Coronavirus) cases, we understand you may have some questions. Below is some helpful information on COVID-19 (Coronavirus).  How can I  protect myself and others from the COVID-19 (Coronavirus)?  Because there is currently no vaccine to prevent infection, the best way to protect yourself is to avoid being exposed to this virus. Put distance between yourself and other people if COVID-19 (Coronavirus) is spreading in your community. The virus is thought to spread mainly from person-to-person.     Between people who are in close contact with one another (within about 6 about) for a prolonged period (10 minutes or longer).    Through respiratory droplets produced when an infected person coughs or sneezes.     The CDC recommends the following additional steps to protect yourself and others:     Wash your hands often with soap and water for at least 20 seconds, especially after blowing your nose, coughing, or sneezing; going to the bathroom; and before eating or preparing food.  Use an alcohol-based hand  that contains at least 60 percent alcohol if soap and water are not available.        Avoid touching your eyes, nose and mouth with unwashed hands.    Avoid close contact with people who are sick.    Stay home when you are sick.    Cover your cough or sneeze with a tissue, then throw the tissue in the trash.    Clean and disinfect frequently touched objects and surfaces.     You can help stop COVID-19 (Coronavirus) by knowing the signs and symptoms:     Fever    Cough    Shortness of breath     Contact your healthcare provider if   Develop symptoms   AND   Have been in close contact with a person known to have COVID-19 (Coronavirus) or live in or have recently traveled from an area with ongoing spread of COVID-19 (Coronavirus). Call ahead before you go to a doctor's office or emergency room. Tell them about your recent travel and your symptoms.   For the most up to date information, visit the CDC's website.  Self-monitoring  Self-monitoring means people should monitor themselves for fever by taking their temperatures twice a day and remain alert  for a cough or difficulty breathing.  It is important to check your health two times each day for 14 days after a potential exposure to a person with COVID-19 (Coronavirus) or after travel from a location where COVID-19 (Coronavirus) is widespread. If you have been exposed to a person with COVID-19 (Coronavirus), it may take up to 14 days to know if you will get sick. Follow the steps below to check and record your health.     Take your temperature with a thermometer twice a day, once in the morning and once in the evening, and watch for a cough or difficulty breathing for 14 days.    Write down your temperature and any COVID-19 symptoms you may have: feeling feverish, coughing, or difficulty breathing.    Stay home from work or school.    Do not take public transportation, taxis, or ride-shares.    Avoid crowded places (such as shopping centers and movie theaters) and limit your activities in public.    Keep your distance from others (about 6 feet or 2 meters).    If you get sick with fever, cough, or trouble breathing, contact your healthcare provider and tell them about your recent travel and/or your symptoms.    If you need to seek medical care for other reasons, such as dialysis, call ahead to your doctor and tell them about your recent travel.     Steps to help prevent the spread of COVID-19 (Coronavirus) if you are sick  If you are sick with COVID-19 (Coronavirus) or suspect you are infected with the virus that causes COVID-19 (Coronavirus), follow the steps below to help prevent the disease from spreading&nbsp;to people in your home and community.     Stay home except to get medical care. Home isolation may be started in consultation with your healthcare clinician.    Separate yourself from other people and animals in your home.    Call ahead before visiting your doctor if you have a medical appointment.    Wear a facemask when you are around other people.    Cover your cough and sneezes.    Clean your hands  "often.    Avoid sharing personal household items.    Clean and disinfect frequently touched objects and surfaces everyday.    You will need to have someone drop off medications or household supplies (if needed) at your house without coming inside or in contact with you or others living in your house.    Monitor your symptoms and seek prompt medical care if your illness is worsening (e.g. Difficulty breathing).    Discontinue home isolation only in consultation with your healthcare provider.     For more detailed and up to date information on what to do if you are sick, visit this link: What to Do If You Are Sick With Coronavirus Disease 2019 (COVID-19).  Do I need to be tested for COVID-19 (Coronavirus)?     At this time, the limited number of available tests are controlled by the state and local health departments and are being reserved for more seriously ill patients, those with known exposure to confirmed patients, and those with recent travel (within 14 days) to countries with high rates of COVID-19 (Coronavirus).    Decisions on which patients receive testing will be based on the local spread of COVID-19 (Coronavirus) as well as the symptoms. Your healthcare provider will make the final decision on whether you should be tested.    In the meantime, if you have concerns that you may have been exposed, it is reasonable to practice \"social distancing.\"&nbsp; If you are ill with a cold or flu-like illness, please monitor your symptoms and reach out to your healthcare provider if your symptoms worsen.    For more up to date information, visit this link: COVID-19 (Coronavirus) Frequently Asked Questions and Answers.      Diagnosis: Cough  Diagnosis ICD: R05  "

## 2020-03-21 NOTE — PROGRESS NOTES
Subjective     Ginger Marshall is a 79 year old female who presents to clinic today for the following health issues:    HPI   Diarrhea      Duration: yesterday     Description:       Consistency of stool: watery       Blood in stool: no        Number of loose stools past 24 hours: several     Intensity:  severe    Accompanying signs and symptoms:       Fever: feels warm        Nausea/vomitting: YES       Abdominal pain: no       Associated with cough    History (recent antibiotics or travel/ill contacts/med changes/testing done): Went to [facility] yesterday morning, denies eating anything unusual, travel history of sick contact, known to have rheumatoid arthritis, on methotrexate and tofacitinib     Precipitating or alleviating factors: None    Therapies tried and outcome: Hydration      Patient Active Problem List   Diagnosis     RA (Rheumatoid Arthritis) off Plaquenil     Menopause     History of colonic polyps     Mitral Regurgitation     Multinodular goiter     CARDIOVASCULAR SCREENING; LDL GOAL LESS THAN 130     Psychophysiologic insomnia     Pulmonary nodule     PSEUDOPHAKIA OU     PVD (POSTERIOR VITREOUS DETACHMENT) OU     PXF (PSEUDOEXFOLIATION OF LENS CAPSULE) OD     GERD (gastroesophageal reflux disease)     Hyperlipidemia LDL goal <100     Advance Care Planning     Neuropathy     SHERRY (obstructive sleep apnea)-severe (AHI 35)     zEncounter for counseling     Anemia of chronic disease     Migraine     Osteoporosis     Cornea guttata, ou     Conjunctival concretions     Stenosis, spinal, lumbar     Other chronic pain     Obesity     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     MGD (meibomian gland dysfunction)     Blepharitis of both eyes     Personal history of healed osteoporosis fracture     Iron malabsorption     Hyperglycemia     Chronic bilateral low back pain without sciatica     Allergic state, subsequent encounter     BMI 32.0-32.9,adult     Spinal stenosis of lumbar region without neurogenic  claudication     Herniated nucleus pulposus, L3-4     S/P lumbar fusion     Anxiety     Low iron     BMI 28.0-28.9,adult     History of depression     DDD (degenerative disc disease), lumbar     Closed fracture of multiple ribs of left side, initial encounter     Past Surgical History:   Procedure Laterality Date     ABDOMEN SURGERY      c-sections     ARTHROSCOPY KNEE RT/LT      left     BACK SURGERY      disc     BREAST BIOPSY, RT/LT      left benign     BREAST SURGERY  90's    lumpectomy     C ANESTH,TOTAL HIP ARTHROPLASTY      Rt hip     C HAND/FINGER SURGERY UNLISTED      right hand     C NONSPECIFIC PROCEDURE  91    left foot surgery     C NONSPECIFIC PROCEDURE  95    R MCP surgery     C TOTAL KNEE ARTHROPLASTY      left     C/SECTION, LOW TRANSVERSE  66,72    x 2     CATARACT IOL, RT/LT       COLONOSCOPY  ,     EYE SURGERY      cataracs     HC ESOPH/GAS REFLUX TEST W NASAL IMPED >1 HR  2012    Procedure:ESOPHAGEAL IMPEDENCE FUNCTION TEST WITH 24 HOUR PH GREATER THAN 1 HOUR; Surgeon:KAYKAY JULIO; Location:[facility]     IR CAUDAL EPIDURAL INJECTION SINGLE  2012    LESI L5-S1 at [facility]     OPTICAL TRACKING SYSTEM FUSION POSTERIOR SPINE LUMBAR N/A 4/3/2017    Procedure: OPTICAL TRACKING SYSTEM FUSION SPINE POSTERIOR LUMBAR ONE LEVEL;  Surgeon: Anthony Michele MD;  Location: [facility]     SURGICAL HISTORY OF -       right knee total replacement       Social History     Tobacco Use     Smoking status: Former Smoker     Packs/day: 1.00     Years: 41.00     Pack years: 41.00     Types: Cigarettes     Last attempt to quit: 1999     Years since quittin.2     Smokeless tobacco: Never Used     Tobacco comment: former smoker   Substance Use Topics     Alcohol use: Never     Alcohol/week: 0.0 standard drinks     Frequency: Never     Family History   Problem Relation Age of Onset     Arthritis Mother      Alzheimer Disease Mother      Hyperlipidemia Mother       Osteoporosis Mother      Heart Disease Father         MI ( from this)     Alcohol/Drug Father      Arthritis Sister      Hypertension Sister      Cancer Son      Diabetes Son      Neurologic Disorder Sister         Schizophrenic     Hypertension Sister      Hyperlipidemia Sister      Mental Illness Sister      Diabetes Son      Other Cancer Son      Glaucoma Daughter          Current Outpatient Medications   Medication Sig Dispense Refill     acetaminophen (TYLENOL) 500 MG tablet Take 2 tablets (1,000 mg) by mouth every 8 hours as needed for pain 100 tablet 0     atorvastatin (LIPITOR) 40 MG tablet TAKE ONE TABLET BY MOUTH EVERY DAY 90 tablet 2     Cholecalciferol (VITAMIN D-3 PO) Take 1,000 Units by mouth 2 times daily       Coenzyme Q10 (CO Q 10 PO)        desvenlafaxine (PRISTIQ) 50 MG 24 hr tablet Take 1 tablet (50 mg) by mouth daily 90 tablet 3     FOLIC ACID PO Take 800 mcg by mouth daily       ibuprofen (ADVIL) 200 MG tablet Take 200 mg by mouth every 4 hours as needed for mild pain       Methotrexate, PF, (RASUVO) 25 MG/0.5ML autoinjector Inject 0.5 mLs (25 mg) Subcutaneous every 7 days . Hold for signs of infection, and seek medical attention. 2 mL 6     multivitamin, therapeutic with minerals (MULTI-VITAMIN) TABS Take 1 tablet by mouth daily       naproxen sodium 220 MG capsule Take 220 mg by mouth 2 times daily (with meals)       pantoprazole (PROTONIX) 40 MG EC tablet TAKE ONE TABLET BY MOUTH EVERY DAY 30-60 MINUTES BEFORE A MEAL 90 tablet 3     sucralfate (CARAFATE) 1 GM tablet TAKE ONE TABLET BY MOUTH FOUR TIMES A DAY AS NEEDED FOR HEARTBURN 120 tablet 1     tofacitinib (XELJANZ XR) 11 MG 24 hr tablet Take 1 tablet (11 mg) by mouth daily 90 tablet 3     topiramate (TOPAMAX) 50 MG tablet Take 1 tablet (50 mg) by mouth 2 times daily 180 tablet 11     triamcinolone (KENALOG) 0.1 % external cream Apply topically 2 times daily as needed for irritation (Patient not taking: Reported on 2020) 30 g 1  "    Allergies   Allergen Reactions     Abatacept Other (See Comments)     Severe headaches  Migraine     Celebrex [Celecoxib]      Ineffective     Celecoxib Unknown and Nausea     Ethanol      Antihistamines     Orencia [Abatacept]      Headache     Septra [Bactrim]      Sulfa Drugs Nausea and Vomiting     \"deathly ill\"  Allergic to everything with Sulfa in it.     Sulfamethoxazole-Trimethoprim Nausea and Nausea and Vomiting     Tramadol Other (See Comments)     Headache  Migraine headache     Valdecoxib Unknown and Nausea     Made me \"very very ill\", might of been \"cramping\"     Adhesive Tape Rash     Plastic tape  Plastic tapes     Antihistamines, Chlorpheniramine-Type  [Alkylamines] Anxiety and Other (See Comments)     Recent Labs   Lab Test 02/27/20  1117 12/19/19  0957 09/12/19  1019 08/15/19  1118  11/23/18  1309  12/14/17  0957  10/28/16  1238  04/29/16  1137   A1C  --   --   --   --   --   --   --   --   --  5.5  --  6.2*   LDL  --  54  --   --   --  72  --  61  --  78  --   --    HDL  --  89  --   --   --   --   --  79  --  77  --   --    TRIG  --  113  --   --   --   --   --  121  --  103  --   --    ALT 19 22 25  --    < > 33   < >  --    < >  --    < > 29   CR 0.66 0.68 0.55 0.57   < > 0.59   < >  --    < >  --    < > 0.61   GFRESTIMATED 85 84 >90 89   < > >90   < >  --    < >  --    < > >90  Non  GFR Calc     GFRESTBLACK >90 >90 >90 >90   < > >90   < >  --    < >  --    < > >90   GFR Calc     POTASSIUM  --  3.8  --  3.5  --  3.9  --   --    < >  --   --  4.3   TSH  --   --   --   --   --  0.81  --  1.06   < >  --    < >  --     < > = values in this interval not displayed.      BP Readings from Last 3 Encounters:   03/21/20 130/85   02/27/20 124/68   01/23/20 128/76    Wt Readings from Last 3 Encounters:   02/27/20 91.4 kg (201 lb 6.4 oz)   01/15/20 91.7 kg (202 lb 3.2 oz)   11/25/19 91.7 kg (202 lb 3.2 oz)                 Reviewed and updated as needed this visit by " Provider  Tobacco         Review of Systems   ROS COMP: Constitutional, HEENT, cardiovascular, pulmonary, GI, , musculoskeletal, neuro, skin, endocrine and psych systems are negative, except as otherwise noted.      Objective    /85   Pulse 100   Temp 98.7  F (37.1  C)   SpO2 97%   There is no height or weight on file to calculate BMI.  Physical Exam   GENERAL: alert, frail and elderly, appears weak and tired  NECK: no adenopathy, no asymmetry, masses, or scars and thyroid normal to palpation normal exam  HEENT: normal exam    RESP: lungs clear to auscultation - no rales, rhonchi or wheezes  CV: tachycardia, normal S1 S2, no S3 or S4 and no murmur, click or rub  ABDOMEN: soft, nontender and borborygmi on auscultation  MS: no gross musculoskeletal defects noted, no edema  NEURO: Grossly intact      Assessment & Plan     (R19.7) Diarrhea, unspecified type  (primary encounter diagnosis)  Comment: 79-year-old female presented with several episodes of diarrhea, vomiting associated with cough, symptoms started yesterday, unable to keep anything down.  Past medical history significant for rheumatoid arthritis, immunosuppressive state.  Patient went to [facility] yesterday although denies any known sick contact or travel history.  Physical examination markable for mild tachycardia and fatigue appearance.  Differentials discussed in detail including viral illness.  Suggested to go ER for further evaluation/management considering frailty and immunosuppressed state.  Patient/daughter understood and in agreement with above plan.  All questions were answered.         Michael Alvarado MD  [facility]

## 2020-03-24 ENCOUNTER — MYC MEDICAL ADVICE (OUTPATIENT)
Dept: INTERNAL MEDICINE | Facility: CLINIC | Age: 80
End: 2020-03-24

## 2020-03-26 DIAGNOSIS — Z76.89 HEALTH CARE HOME: Primary | ICD-10-CM

## 2020-03-27 ENCOUNTER — PATIENT OUTREACH (OUTPATIENT)
Dept: CARE COORDINATION | Facility: CLINIC | Age: 80
End: 2020-03-27

## 2020-03-27 ENCOUNTER — OFFICE VISIT (OUTPATIENT)
Dept: INTERNAL MEDICINE | Facility: CLINIC | Age: 80
End: 2020-03-27
Payer: COMMERCIAL

## 2020-03-27 VITALS
DIASTOLIC BLOOD PRESSURE: 58 MMHG | TEMPERATURE: 98 F | HEART RATE: 90 BPM | SYSTOLIC BLOOD PRESSURE: 132 MMHG | OXYGEN SATURATION: 94 % | WEIGHT: 198 LBS | BODY MASS INDEX: 31.01 KG/M2 | RESPIRATION RATE: 20 BRPM

## 2020-03-27 DIAGNOSIS — A08.11 NOROVIRUS: Primary | ICD-10-CM

## 2020-03-27 DIAGNOSIS — M05.79 RHEUMATOID ARTHRITIS INVOLVING MULTIPLE SITES WITH POSITIVE RHEUMATOID FACTOR (H): ICD-10-CM

## 2020-03-27 DIAGNOSIS — E87.6 HYPOKALEMIA: ICD-10-CM

## 2020-03-27 DIAGNOSIS — T14.8XXA BLISTER: ICD-10-CM

## 2020-03-27 DIAGNOSIS — E83.42 HYPOMAGNESEMIA: ICD-10-CM

## 2020-03-27 DIAGNOSIS — E66.09 NON MORBID OBESITY DUE TO EXCESS CALORIES: ICD-10-CM

## 2020-03-27 DIAGNOSIS — D64.9 ANEMIA, UNSPECIFIED TYPE: ICD-10-CM

## 2020-03-27 LAB
ANION GAP SERPL CALCULATED.3IONS-SCNC: 4 MMOL/L (ref 3–14)
BASOPHILS # BLD AUTO: 0 10E9/L (ref 0–0.2)
BASOPHILS NFR BLD AUTO: 0.2 %
BUN SERPL-MCNC: 18 MG/DL (ref 7–30)
CALCIUM SERPL-MCNC: 9.1 MG/DL (ref 8.5–10.1)
CHLORIDE SERPL-SCNC: 112 MMOL/L (ref 94–109)
CO2 SERPL-SCNC: 24 MMOL/L (ref 20–32)
CREAT SERPL-MCNC: 0.57 MG/DL (ref 0.52–1.04)
DIFFERENTIAL METHOD BLD: ABNORMAL
EOSINOPHIL # BLD AUTO: 0.3 10E9/L (ref 0–0.7)
EOSINOPHIL NFR BLD AUTO: 6.4 %
ERYTHROCYTE [DISTWIDTH] IN BLOOD BY AUTOMATED COUNT: 14.7 % (ref 10–15)
GFR SERPL CREATININE-BSD FRML MDRD: 89 ML/MIN/{1.73_M2}
GLUCOSE SERPL-MCNC: 98 MG/DL (ref 70–99)
HCT VFR BLD AUTO: 34.7 % (ref 35–47)
HGB BLD-MCNC: 11.2 G/DL (ref 11.7–15.7)
LYMPHOCYTES # BLD AUTO: 1.6 10E9/L (ref 0.8–5.3)
LYMPHOCYTES NFR BLD AUTO: 31.7 %
MAGNESIUM SERPL-MCNC: 2.1 MG/DL (ref 1.6–2.3)
MCH RBC QN AUTO: 30.4 PG (ref 26.5–33)
MCHC RBC AUTO-ENTMCNC: 32.3 G/DL (ref 31.5–36.5)
MCV RBC AUTO: 94 FL (ref 78–100)
MONOCYTES # BLD AUTO: 0.9 10E9/L (ref 0–1.3)
MONOCYTES NFR BLD AUTO: 17 %
NEUTROPHILS # BLD AUTO: 2.2 10E9/L (ref 1.6–8.3)
NEUTROPHILS NFR BLD AUTO: 44.7 %
PLATELET # BLD AUTO: 356 10E9/L (ref 150–450)
POTASSIUM SERPL-SCNC: 3.4 MMOL/L (ref 3.4–5.3)
RBC # BLD AUTO: 3.68 10E12/L (ref 3.8–5.2)
SODIUM SERPL-SCNC: 140 MMOL/L (ref 133–144)
WBC # BLD AUTO: 5 10E9/L (ref 4–11)

## 2020-03-27 PROCEDURE — 99495 TRANSJ CARE MGMT MOD F2F 14D: CPT | Performed by: INTERNAL MEDICINE

## 2020-03-27 PROCEDURE — 83735 ASSAY OF MAGNESIUM: CPT | Performed by: INTERNAL MEDICINE

## 2020-03-27 PROCEDURE — 36415 COLL VENOUS BLD VENIPUNCTURE: CPT | Performed by: INTERNAL MEDICINE

## 2020-03-27 PROCEDURE — 80048 BASIC METABOLIC PNL TOTAL CA: CPT | Performed by: INTERNAL MEDICINE

## 2020-03-27 PROCEDURE — 85025 COMPLETE CBC W/AUTO DIFF WBC: CPT | Performed by: INTERNAL MEDICINE

## 2020-03-27 RX ORDER — TOPIRAMATE 50 MG/1
50 TABLET, FILM COATED ORAL 2 TIMES DAILY
Qty: 180 TABLET | Refills: 11 | Status: SHIPPED | OUTPATIENT
Start: 2020-03-27 | End: 2021-06-16

## 2020-03-27 NOTE — PROGRESS NOTES
Subjective     Ginger Marshall is a 79 year old female who presents to clinic today for the following health issues:    HPI   Hospital Follow-up Visit:  Hospital/Nursing Home/IP Rehab Facility: [facility]   Date of Admission: 3/21/2020  Date of Discharge: 3/24/2020  Reason(s) for Admission: Severe sepsis (HC) (Primary Dx); Gastroenteritis; Left sided abdominal pain            Problems taking medications regularly: None        Medication changes since discharge: None       Problems adhering to non-medication therapy:  None    Summary of hospitalization:  [facility] information obtained and reviewed    BRIEF HOSPITAL COURSE: 79-year-old female with a history of rheumatoid arthritis on methotrexate presented with watery diarrhea, nausea, vomiting, dehydration and Sirs related to that. Stool tested positive for norovirus. She was treated supportively with intravenous fluids and electrolyte repletion. See admission H&P for details. By the day of discharge diarrhea had slowed down and the patient was able to hydrate orally adequately. She was advised to continue to drink plenty of fluids, Gatorade or Pedialyte or other electrolyte-containing solutions each time she has loose stools.    Diagnostic Tests/Treatments reviewed.  Follow up needed: none  Other Healthcare Providers Involved in Patient s Care:         Specialist appointment - rheum  Update since discharge: improved.     Post Discharge Medication Reconciliation: discharge medications reconciled, continue medications without change.  Plan of care communicated with patient     Coding guidelines for this visit:  Type of Medical   Decision Making Face-to-Face Visit       within 7 Days of discharge Face-to-Face Visit        within 14 days of discharge   Moderate Complexity [id number] [id number]   High Complexity [id number]            Diarrhea is all gone.  The patient denies abdominal or flank pain, anorexia, nausea or vomiting, dysphagia, change in bowel habits or black  or bloody stools or weight loss.    She did have a blister on her left mons pubis.  No trauma.  No pain.  Appears to be healing.  Doesn't have blisters anywhere else         Reviewed and updated as needed this visit by Provider  Tobacco  Allergies  Meds  Problems  Med Hx  Surg Hx  Fam Hx         Review of Systems    ROS: 10 point ROS neg other than the symptoms noted above in the HPI.       Objective    /58   Pulse 90   Temp 98  F (36.7  C) (Oral)   Resp 20   Wt 89.8 kg (198 lb)   SpO2 94%   BMI 31.01 kg/m    Body mass index is 31.01 kg/m .  Physical Exam   GENERAL APPEARANCE: healthy, alert and no distress    RESP: lungs clear to auscultation - no rales, rhonchi or wheezes  CV: regular rates and rhythm and normal S1 S2, no S3 or S4  ABDOMEN:  soft, nontender, no HSM or masses and bowel sounds normal  SKIN: no suspicious lesions or rashes, eschar on the left mons pubis  PSYCH: mentation appears normal. and affect normal/bright  No edema       Diagnostic Test Results:  Labs reviewed in Epic        Assessment & Plan     1. Norovirus  Appears well recovered.      2. Hypokalemia    - Basic metabolic panel    3. Hypomagnesemia    - Magnesium    4. Anemia, unspecified type    - CBC with platelets differential    5. Blister  Unclear etiology.  Appears to be healing well.  Will need biopsy if doesn't fully resolve     6. Rheumatoid arthritis involving multiple sites with positive rheumatoid factor (H)    - topiramate (TOPAMAX) 50 MG tablet; Take 1 tablet (50 mg) by mouth 2 times daily  Dispense: 180 tablet; Refill: 11    7. Obesity    - topiramate (TOPAMAX) 50 MG tablet; Take 1 tablet (50 mg) by mouth 2 times daily  Dispense: 180 tablet; Refill: 11       Patient Instructions   If the skin blister doesn't heal then you will need a biopsy.     Return if symptoms worsen or fail to improve.    Georgia Váqzuez MD  [facility]

## 2020-03-27 NOTE — Clinical Note
Ginger was in the hospital this month and had labs drawn.  Are you ok with her skipping her May blood draw and doing it in June instead?  Thanks,  Georgia

## 2020-03-27 NOTE — PROGRESS NOTES
Scheduled Follow Up Call: Attempt 1 Community Health Worker called and left a message for the patient. If the patient is returning my call, please transfer the patient to Carolyn QUARLES at [phone].      Reason for Referral: Care Transition: Inpatient to outpatient    Additional pertinent details: [facility] discharge referral list. Patient was recently hospitalized at [facility] for sepsis and norovirus. She was discharged home    Patient was admitted on 3/21 and discharged on 3/24

## 2020-03-30 ENCOUNTER — PATIENT OUTREACH (OUTPATIENT)
Dept: CARE COORDINATION | Facility: CLINIC | Age: 80
End: 2020-03-30

## 2020-03-30 NOTE — PROGRESS NOTES
CHW called the patient today and left a message. CHW tried attempt two without success. CHW sent a CC intro letter via [employer].      Reason for Referral: Care Transition: Inpatient to outpatient    Additional pertinent details: [facility] discharge referral list. Patient was recently hospitalized at [facility] for sepsis and norovirus. She was discharged home    Patient was admitted on 3/21 and discharged on 3/24

## 2020-03-30 NOTE — LETTER
[facility] CARE COORDINATION    March 30, 2020    Ginger Marshall  [address]   [place] [address]      Dear Ginger,    I am a clinic care coordinator who works with Georgia Vázquez MD at [facility]. I have been trying to reach you recently to introduce Clinic Care Coordination and to see if there was anything I could assist you with.  Below is a description of clinic care coordination and how I can further assist you.      The clinic care coordinator team is made up of a registered nurse,  and community health worker who understand the health care system. The goal of clinic care coordination is to help you manage your health and improve access to the health care system in the most efficient manner. The team can assist you in meeting your health care goals by providing education, coordinating services, strengthening the communication among your providers  and supporting you with any resource needs.    Please feel free to contact the Community Health Worker, at [phone] with any questions or concerns. We are focused on providing you with the highest-quality healthcare experience possible and that all starts with you.     Sincerely,       Mary Ramires  Community Health Worker   [facility]  [address] [place], [place], [address]  [facility]  [address] [place], [place], [address]  [facility]  [address] [place], [place], [address]  [email or web address]   Office: [phone]   Gender Pronouns: she/her

## 2020-04-01 NOTE — PROGRESS NOTES
Patient responded to CHW's [employer] message with the following:    Mary,  thank you for contacting me.  I have been wondering how it is going to work if I need medical care of any kind.  I have written down your phone number and email address so I can contact you if needed.     Ginger Marshall  [phone]    CHW will not reach out again unless the patient contacts them.

## 2020-04-07 ENCOUNTER — TELEPHONE (OUTPATIENT)
Dept: FAMILY MEDICINE | Facility: CLINIC | Age: 80
End: 2020-04-07

## 2020-04-07 NOTE — TELEPHONE ENCOUNTER
Prior Authorization Approval    Authorization Effective Date: 3/8/2020  Authorization Expiration Date: 4/7/2021  Medication: rasuvo  Approved Dose/Quantity:   Reference #: [id number]   Insurance Company: [employer] - Phone [phone] Fax [phone]  Expected CoPay:       CoPay Card Available:      Foundation Assistance Needed:    Which Pharmacy is filling the prescription (Not needed for infusion/clinic administered): [facility] - [address]  Pharmacy Notified: Yes  Patient Notified: Yes, **Instructed pharmacy to notify patient when script is ready to /ship.**

## 2020-04-07 NOTE — TELEPHONE ENCOUNTER
Prior Authorization Retail Medication Request    Medication/Dose: rasuvo  ICD code (if different than what is on RX):  m0579  Previously Tried and Failed:  unknown  Rationale:  unknown    Insurance Name:  [facility]   Insurance ID:  [id number]      Pharmacy Information (if different than what is on RX)  Name:  [facility]  Phone:  [phone]      PA  4/3/2020- needs renewal- next dose is in 3 days.

## 2020-04-07 NOTE — TELEPHONE ENCOUNTER
PA Initiation    Medication: rasuvo  Insurance Company: [employer] - Phone [phone] Fax [phone]  Pharmacy Filling the Rx: [place] [facility] [address] [facility]  Filling Pharmacy Phone: [phone]  Filling Pharmacy Fax: [phone]  Start Date: 4/7/2020

## 2020-04-08 ENCOUNTER — TELEPHONE (OUTPATIENT)
Dept: PHYSICAL THERAPY | Facility: CLINIC | Age: 80
End: 2020-04-08

## 2020-04-09 ENCOUNTER — TELEPHONE (OUTPATIENT)
Dept: FAMILY MEDICINE | Facility: CLINIC | Age: 80
End: 2020-04-09

## 2020-04-09 NOTE — TELEPHONE ENCOUNTER
Reason for Call:  Other Patient request    Detailed comments: Patient calling, she would like to speak with someone about having possible shingles again. States she spoke with Dr. Vázquez in the past about the same symptoms and now they are back. She was also unable to send the pictures through [facility] and would like a call back on what she should do now. Please call back to advise.    Phone Number Patient can be reached at: Cell number on file:    Telephone Information:   Mobile [phone]       Best Time: any    Can we leave a detailed message on this number? YES    Call taken on 4/9/2020 at 12:49 PM by Davey Mckeon

## 2020-04-09 NOTE — TELEPHONE ENCOUNTER
Left message on voicemail for patient to return call to RN hotline at # [phone].    When patient calls back- Please offer video visit with Dr. Vázquez and ensure patient has the technology for this    To receive an invitation and connect with your provider, the [facility] video visit technology must be accessible from your smartphone or personal device. If using a computer/laptop, must have a working camera and microphone and speaker    Zackary Pelaez RN

## 2020-04-10 ENCOUNTER — MYC MEDICAL ADVICE (OUTPATIENT)
Dept: INTERNAL MEDICINE | Facility: CLINIC | Age: 80
End: 2020-04-10

## 2020-04-10 ENCOUNTER — VIRTUAL VISIT (OUTPATIENT)
Dept: INTERNAL MEDICINE | Facility: CLINIC | Age: 80
End: 2020-04-10
Payer: COMMERCIAL

## 2020-04-10 DIAGNOSIS — B00.9 HERPES SIMPLEX VIRUS INFECTION: Primary | ICD-10-CM

## 2020-04-10 DIAGNOSIS — B00.9 HERPES SIMPLEX VIRUS INFECTION: ICD-10-CM

## 2020-04-10 PROCEDURE — 99213 OFFICE O/P EST LOW 20 MIN: CPT | Mod: TEL | Performed by: INTERNAL MEDICINE

## 2020-04-10 RX ORDER — VALACYCLOVIR HYDROCHLORIDE 500 MG/1
500 TABLET, FILM COATED ORAL 2 TIMES DAILY
Qty: 6 TABLET | Refills: 1 | Status: SHIPPED | OUTPATIENT
Start: 2020-04-10 | End: 2020-04-13

## 2020-04-10 NOTE — TELEPHONE ENCOUNTER
Spoke with pt. States she thinks she has this figured out. Video visit scheduled for pt today with Dr. Vázquez.     Margaux Castillo RN  [facility]

## 2020-04-10 NOTE — TELEPHONE ENCOUNTER
Patient returned call to RN Hotline. States she is going to try and figure out the [employer] shalom on her android phone. She will let us know if she is able to figure it out or not and call back the RN Hotline.     Kelli Smallwood RN

## 2020-04-10 NOTE — PROGRESS NOTES
"Ginger Marshall is a 79 year old female who is being evaluated via a billable video visit.      The patient has been notified of following:     \"This video visit will be conducted via a call between you and your physician/provider. We have found that certain health care needs can be provided without the need for an in-person physical exam.  This service lets us provide the care you need with a video conversation.  If a prescription is necessary we can send it directly to your pharmacy.  If lab work is needed we can place an order for that and you can then stop by our lab to have the test done at a later time.    Video visits are billed at different rates depending on your insurance coverage.  Please reach out to your insurance provider with any questions.    If during the course of the call the physician/provider feels a video visit is not appropriate, you will not be charged for this service.\"    Patient has given verbal consent for Video visit? Yes    How would you like to obtain your AVS? Rosina    Patient would like the video invitation sent by: Text to cell phone: [phone]    Subjective     Ginger Marshall is a 79 year old female who presents to clinic today for the following health issues:  Converted to phone visit due to patient not being able to do the video visit.    [place] with red blisters inside on the left upper pubic area..  It isn't painful.  Left pubic area where she had the rash before.  It feels a little different like a slight burning sensation.  Otherwise is asymptomatic  HPI          Converted to phone visit as patient could not connect               Reviewed and updated as needed this visit by Provider  Tobacco  Allergies  Meds  Problems  Med Hx  Surg Hx  Fam Hx         Review of Systems    ROS: 4 point ROS neg other than the symptoms noted above in the HPI.        Objective    There were no vitals taken for this visit.  Estimated body mass index is 31.01 kg/m  as calculated from " "the following:    Height as of 2/27/20: 1.702 m (5' 7\").    Weight as of 3/27/20: 89.8 kg (198 lb).  Physical Exam   Psych: Alert and oriented times 3; coherent speech, normal   rate and volume, able to articulate logical thoughts, able   to abstract reason, no tangential thoughts, no hallucinations   or delusions  Her affect is full  No acute distress       Diagnostic Test Results:  none         Assessment & Plan     1. Herpes simplex virus infection  Not video or photo from patient but sounds like herpes.  Will try valtrex and if no improvement then will try again for video visit with IT help.  Patient agrees.    - valACYclovir (VALTREX) 500 MG tablet; Take 1 tablet (500 mg) by mouth 2 times daily for 3 days  Dispense: 6 tablet; Refill: 1         No follow-ups on file.    Georgia Vázquez MD  [facility]      No follow-ups on file.     1:00 pm start  1:08 PM stop  Georgia Vázquez MD      "

## 2020-04-13 RX ORDER — VALACYCLOVIR HYDROCHLORIDE 500 MG/1
500 TABLET, FILM COATED ORAL DAILY
Qty: 90 TABLET | Refills: 0 | Status: SHIPPED | OUTPATIENT
Start: 2020-04-13 | End: 2020-07-03

## 2020-04-24 ENCOUNTER — TELEPHONE (OUTPATIENT)
Dept: PHYSICAL THERAPY | Facility: CLINIC | Age: 80
End: 2020-04-24

## 2020-04-27 ENCOUNTER — VIRTUAL VISIT (OUTPATIENT)
Dept: INTERNAL MEDICINE | Facility: CLINIC | Age: 80
End: 2020-04-27
Payer: COMMERCIAL

## 2020-04-27 ENCOUNTER — ANCILLARY PROCEDURE (OUTPATIENT)
Dept: GENERAL RADIOLOGY | Facility: CLINIC | Age: 80
End: 2020-04-27
Attending: INTERNAL MEDICINE
Payer: COMMERCIAL

## 2020-04-27 ENCOUNTER — OFFICE VISIT (OUTPATIENT)
Dept: INTERNAL MEDICINE | Facility: CLINIC | Age: 80
End: 2020-04-27
Payer: COMMERCIAL

## 2020-04-27 VITALS
OXYGEN SATURATION: 94 % | HEIGHT: 67 IN | HEART RATE: 94 BPM | BODY MASS INDEX: 30.98 KG/M2 | TEMPERATURE: 98.1 F | WEIGHT: 197.4 LBS | RESPIRATION RATE: 17 BRPM

## 2020-04-27 DIAGNOSIS — R07.81 RIB PAIN ON RIGHT SIDE: ICD-10-CM

## 2020-04-27 DIAGNOSIS — M25.531 RIGHT WRIST PAIN: Primary | ICD-10-CM

## 2020-04-27 DIAGNOSIS — R29.6 FALLS FREQUENTLY: ICD-10-CM

## 2020-04-27 PROCEDURE — 73110 X-RAY EXAM OF WRIST: CPT | Mod: RT

## 2020-04-27 PROCEDURE — 71101 X-RAY EXAM UNILAT RIBS/CHEST: CPT | Mod: RT

## 2020-04-27 PROCEDURE — 99214 OFFICE O/P EST MOD 30 MIN: CPT | Performed by: INTERNAL MEDICINE

## 2020-04-27 RX ORDER — OXYCODONE HYDROCHLORIDE 5 MG/1
5 TABLET ORAL EVERY 6 HOURS PRN
Qty: 60 TABLET | Refills: 0 | Status: SHIPPED | OUTPATIENT
Start: 2020-04-27 | End: 2020-05-06

## 2020-04-27 ASSESSMENT — MIFFLIN-ST. JEOR: SCORE: 1413.03

## 2020-04-27 NOTE — PROGRESS NOTES
Subjective     Ginger Marshall is a 79 year old female who presents to clinic today for the following health issues:    HPI     Jose Luist from today  Dr Vázquez, do you have any open time at all today?  I fell on Saturday quite hard onto the cement floor we have here in my apt building as I was getting off the elevator.  I had a huge knot just above my right eye and now the eye is very purple.  I also used my right hand that I recently had surgery on to try to catch myself.  It is quite sore, not sure if I damaged it or not.  And yesterday my right side/ribs area started hurting like last fall when they were broken.  I just think I need some xrays, so if you don't have any open time then I will call the appointment line.       Thank you  Ginger Marshall     Her wrist is sore and skin is tight.  she has problems flexing and extending the wrist.  It feels swollen and is warm to the touch.  She thinks she feel on her outstretched hand but she is not sure.    She wants an xray as she feels it is broken.     The patient denies cough, chest pain, dyspnea, wheezing or hemoptysis.     Patient Active Problem List   Diagnosis     RA (Rheumatoid Arthritis) off Plaquenil     Menopause     History of colonic polyps     Mitral Regurgitation     Multinodular goiter     CARDIOVASCULAR SCREENING; LDL GOAL LESS THAN 130     Psychophysiologic insomnia     Pulmonary nodule     PSEUDOPHAKIA OU     PVD (POSTERIOR VITREOUS DETACHMENT) OU     PXF (PSEUDOEXFOLIATION OF LENS CAPSULE) OD     GERD (gastroesophageal reflux disease)     Hyperlipidemia LDL goal <100     Advance Care Planning     Neuropathy     SHERRY (obstructive sleep apnea)-severe (AHI 35)     zEncounter for counseling     Anemia of chronic disease     Migraine     Osteoporosis     Cornea guttata, ou     Conjunctival concretions     Stenosis, spinal, lumbar     Other chronic pain     Obesity     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     MGD (meibomian gland dysfunction)      Blepharitis of both eyes     Personal history of healed osteoporosis fracture     Iron malabsorption     Hyperglycemia     Chronic bilateral low back pain without sciatica     Allergic state, subsequent encounter     BMI 32.0-32.9,adult     Spinal stenosis of lumbar region without neurogenic claudication     Herniated nucleus pulposus, L3-4     S/P lumbar fusion     Anxiety     Low iron     BMI 28.0-28.9,adult     History of depression     DDD (degenerative disc disease), lumbar     Closed fracture of multiple ribs of left side, initial encounter     Past Surgical History:   Procedure Laterality Date     ABDOMEN SURGERY      c-sections     ARTHROSCOPY KNEE RT/LT      left     BACK SURGERY      disc     BREAST BIOPSY, RT/LT      left benign     BREAST SURGERY  's    lumpectomy     C ANESTH,TOTAL HIP ARTHROPLASTY      Rt hip     C HAND/FINGER SURGERY UNLISTED      right hand     C NONSPECIFIC PROCEDURE  91    left foot surgery     C NONSPECIFIC PROCEDURE  95    R MCP surgery     C TOTAL KNEE ARTHROPLASTY      left     C/SECTION, LOW TRANSVERSE  66,72    x 2     CATARACT IOL, RT/LT       COLONOSCOPY  ,     EYE SURGERY      cataracs     HC ESOPH/GAS REFLUX TEST W NASAL IMPED >1 HR  2012    Procedure:ESOPHAGEAL IMPEDENCE FUNCTION TEST WITH 24 HOUR PH GREATER THAN 1 HOUR; Surgeon:KAYKAY JULIO; Location:[facility]     IR CAUDAL EPIDURAL INJECTION SINGLE  2012    LESI L5-S1 at [facility] [place]     OPTICAL TRACKING SYSTEM FUSION POSTERIOR SPINE LUMBAR N/A 4/3/2017    Procedure: OPTICAL TRACKING SYSTEM FUSION SPINE POSTERIOR LUMBAR ONE LEVEL;  Surgeon: Anthony Michele MD;  Location:  OR     SURGICAL HISTORY OF -       right knee total replacement       Social History     Tobacco Use     Smoking status: Former Smoker     Packs/day: 1.00     Years: 41.00     Pack years: 41.00     Types: Cigarettes     Last attempt to quit: 1999     Years since quittin.3     Smokeless  tobacco: Never Used     Tobacco comment: former smoker   Substance Use Topics     Alcohol use: Never     Alcohol/week: 0.0 standard drinks     Frequency: Never     Family History   Problem Relation Age of Onset     Arthritis Mother      Alzheimer Disease Mother      Hyperlipidemia Mother      Osteoporosis Mother      Heart Disease Father         MI ( from this)     Alcohol/Drug Father      Arthritis Sister      Hypertension Sister      Cancer Son      Diabetes Son      Neurologic Disorder Sister         Schizophrenic     Hypertension Sister      Hyperlipidemia Sister      Mental Illness Sister      Diabetes Son      Other Cancer Son      Glaucoma Daughter          Current Outpatient Medications   Medication Sig Dispense Refill     order for DME Equipment being ordered: Up walker 1 Device 0     oxyCODONE (ROXICODONE) 5 MG tablet Take 1 tablet (5 mg) by mouth every 6 hours as needed for moderate to severe pain 60 tablet 0     acetaminophen (TYLENOL) 500 MG tablet Take 2 tablets (1,000 mg) by mouth every 8 hours as needed for pain 100 tablet 0     atorvastatin (LIPITOR) 40 MG tablet TAKE ONE TABLET BY MOUTH EVERY DAY 90 tablet 2     Cholecalciferol (VITAMIN D-3 PO) Take 1,000 Units by mouth 2 times daily       Coenzyme Q10 (CO Q 10 PO)        desvenlafaxine (PRISTIQ) 50 MG 24 hr tablet Take 1 tablet (50 mg) by mouth daily 90 tablet 3     FOLIC ACID PO Take 800 mcg by mouth daily       ibuprofen (ADVIL) 200 MG tablet Take 200 mg by mouth every 4 hours as needed for mild pain       Methotrexate, PF, (RASUVO) 25 MG/0.5ML autoinjector Inject 0.5 mLs (25 mg) Subcutaneous every 7 days . Hold for signs of infection, and seek medical attention. 2 mL 6     multivitamin, therapeutic with minerals (MULTI-VITAMIN) TABS Take 1 tablet by mouth daily       naproxen sodium 220 MG capsule Take 220 mg by mouth 2 times daily (with meals)       pantoprazole (PROTONIX) 40 MG EC tablet TAKE ONE TABLET BY MOUTH EVERY DAY 30-60 MINUTES  "BEFORE A MEAL 90 tablet 3     sucralfate (CARAFATE) 1 GM tablet TAKE ONE TABLET BY MOUTH FOUR TIMES A DAY AS NEEDED FOR HEARTBURN 120 tablet 1     tofacitinib (XELJANZ XR) 11 MG 24 hr tablet Take 1 tablet (11 mg) by mouth daily 90 tablet 3     topiramate (TOPAMAX) 50 MG tablet Take 1 tablet (50 mg) by mouth 2 times daily 180 tablet 11     triamcinolone (KENALOG) 0.1 % external cream Apply topically 2 times daily as needed for irritation 30 g 1     valACYclovir (VALTREX) 500 MG tablet Take 1 tablet (500 mg) by mouth daily 90 tablet 0     Allergies   Allergen Reactions     Abatacept Other (See Comments)     Severe headaches  Migraine     Celebrex [Celecoxib]      Ineffective     Celecoxib Unknown and Nausea     Ethanol      Antihistamines     Orencia [Abatacept]      Headache     Septra [Bactrim]      Sulfa Drugs Nausea and Vomiting     \"deathly ill\"  Allergic to everything with Sulfa in it.     Sulfamethoxazole-Trimethoprim Nausea and Nausea and Vomiting     Tramadol Other (See Comments)     Headache  Migraine headache     Valdecoxib Unknown and Nausea     Made me \"very very ill\", might of been \"cramping\"     Adhesive Tape Rash     Plastic tape  Plastic tapes     Antihistamines, Chlorpheniramine-Type  [Alkylamines] Anxiety and Other (See Comments)         Reviewed and updated as needed this visit by Provider  Tobacco  Allergies  Meds  Problems  Med Hx  Surg Hx  Fam Hx         Review of Systems    ROS: 4 point ROS neg other than the symptoms noted above in the HPI.        Objective    Pulse 94   Temp 98.1  F (36.7  C) (Oral)   Resp 17   Ht 1.702 m (5' 7\")   Wt 89.5 kg (197 lb 6.4 oz)   SpO2 94%   BMI 30.92 kg/m    Body mass index is 30.92 kg/m .  Physical Exam   GENERAL APPEARANCE: healthy, alert and mild distress  RESP: lungs clear to auscultation - no rales, rhonchi or wheezes  CV: regular rates and rhythm and normal S1 S2, no S3 or S4  ABDOMEN:  soft, nontender, no HSM or masses and bowel sounds " normal  SKIN: no suspicious lesions or rashes  PSYCH: mentation appears normal. and affect normal/bright  Swelling over the right wrist, reduced range of motion.  Pain to palpation of the wrist and hand.        Diagnostic Test Results:  Xray - see epic.  No fractures         Assessment & Plan     1. Right wrist pain  Suspect sprain.  Will use a splint she has at home.  Xray negative.    - XR Wrist Right G/E 3 Views  - oxyCODONE (ROXICODONE) 5 MG tablet; Take 1 tablet (5 mg) by mouth every 6 hours as needed for moderate to severe pain  Dispense: 60 tablet; Refill: 0    2. Rib pain on right side  No fracture.  Lungs clear.  No pneumothorax  - XR Ribs & Chest Right G/E 3 Views  - oxyCODONE (ROXICODONE) 5 MG tablet; Take 1 tablet (5 mg) by mouth every 6 hours as needed for moderate to severe pain  Dispense: 60 tablet; Refill: 0    3. Falls frequently  Due to repeated falls and imbalance, a walker is medically necessary  - order for DME; Equipment being ordered: Up walker  Dispense: 1 Device; Refill: 0           Return if symptoms worsen or fail to improve.    Georgia Vázquez MD  [facility]

## 2020-04-27 NOTE — PROGRESS NOTES
"Ginger Marshall is a 79 year old female who is being evaluated via a billable telephone visit.      The patient has been notified of following:   \"This telephone visit will be conducted via a call between you and your physician/provider. We have found that certain health care needs can be provided without the need for a physical exam. This service lets us provide the care you need with a short phone conversation. If a prescription is necessary we can send it directly to your pharmacy. If lab work is needed we can place an order for that and you can then stop by our lab to have the test done at a later time.  Telephone visits are billed at different rates depending on your insurance coverage. During this emergency period, for some insurers they may be billed the same as an in-person visit. Please reach out to your insurance provider with any questions.  If during the course of the call the physician/provider feels a telephone visit is not appropriate, you will not be charged for this service.\"    Patient has given verbal consent for Telephone visit?  Yes    How would you like to obtain your AVS? Rosina Allen     Ginger Marshall is a 79 year old female who presents to clinic today for the following health issues:    Rosina from today  Dr Vázquez, do you have any open time at all today?  I fell on Saturday quite hard onto the cement floor we have here in my apt building as I was getting off the elevator.  I had a huge knot just above my right eye and now the eye is very purple.  I also used my right hand that I recently had surgery on to try to catch myself.  It is quite sore, not sure if I damaged it or not.  And yesterday my right side/ribs area started hurting like last fall when they were broken.  I just think I need some xrays, so if you don't have any open time then I will call the appointment line.       Thank you  Ginger Marshall    Her wrist is sore and skin is tight.    She wants an xray as she feels it is " broken.  Will have patient come in today to be seen.  Patient will arrive at 11:30.    Dr. Vázquez

## 2020-05-05 ENCOUNTER — MEDICAL CORRESPONDENCE (OUTPATIENT)
Dept: HEALTH INFORMATION MANAGEMENT | Facility: CLINIC | Age: 80
End: 2020-05-05

## 2020-05-06 ENCOUNTER — MYC REFILL (OUTPATIENT)
Dept: INTERNAL MEDICINE | Facility: CLINIC | Age: 80
End: 2020-05-06

## 2020-05-06 DIAGNOSIS — M25.531 RIGHT WRIST PAIN: ICD-10-CM

## 2020-05-06 DIAGNOSIS — G89.29 OTHER CHRONIC PAIN: Chronic | ICD-10-CM

## 2020-05-06 DIAGNOSIS — R07.81 RIB PAIN ON RIGHT SIDE: ICD-10-CM

## 2020-05-06 NOTE — TELEPHONE ENCOUNTER
Patient Comment: Could radiology have missed something?  Because my hand really hurts.  Sharp pains.    Controlled Substance Refill Request for oxyCODONE (ROXICODONE) 5 MG tablet   Problem List Complete:  No     PROVIDER TO CONSIDER COMPLETION OF PROBLEM LIST AND OVERVIEW/CONTROLLED SUBSTANCE AGREEMENT    Last Written Prescription Date:  4-  Last Fill Quantity: 60,   # refills: 0    [facility]: 4-27-20    Future Office visit:     Controlled substance agreement:   Encounter-Level CSA:    There are no encounter-level csa.     Patient-Level CSA:    There are no patient-level csa.         Last Urine Drug Screen: No results found for: CDAUT, No results found for: COMDAT, No results found for: THC13, PCP13, COC13, MAMP13, OPI13, AMP13, BZO13, TCA13, MTD13, BAR13, OXY13, PPX13, BUP13     Processing:  Rx to be electronically transmitted to pharmacy by provider      [email or web address]       checked in past 3 months?  No, route to RN

## 2020-05-08 RX ORDER — OXYCODONE HYDROCHLORIDE 5 MG/1
5 TABLET ORAL EVERY 6 HOURS PRN
Qty: 60 TABLET | Refills: 0 | Status: SHIPPED | OUTPATIENT
Start: 2020-05-08 | End: 2020-12-10

## 2020-05-08 NOTE — TELEPHONE ENCOUNTER
Problem list and overview have already been completed    Patient is followed by Data Unavailable for ongoing prescription of pain medication.  All refills should be approved by this provider, or covering partner.    Medication(s):. Oxycodone 5 mg   Maximum quantity per month: 60  Clinic visit frequency required: Q 3 months     Controlled substance agreement on file: Yes       Date(s): 5/2014    Pain Clinic evaluation in the past: Yes       Date/Location:  7/8/15 [facility] Total Score(s):  No flowsheet data found.    Last [facility] website verification: 5/8/2020, last filled on 4/27/2020   [email or web address]     Zackary Pleaez RN

## 2020-05-11 ENCOUNTER — TELEPHONE (OUTPATIENT)
Dept: ORTHOPEDICS | Facility: CLINIC | Age: 80
End: 2020-05-11

## 2020-05-11 NOTE — TELEPHONE ENCOUNTER
Reason for call:  Other   Patient called regarding (reason for call): appointment  Additional comments:  She had xrays taken of her wrist last week. Does she need to be seen? Please call and let know.     Phone number to reach patient:  Home number on file [phone] (home)    Best Time:   Any     Can we leave a detailed message on this number?  YES    Travel screening: Not Applicable

## 2020-05-12 ENCOUNTER — VIRTUAL VISIT (OUTPATIENT)
Dept: ORTHOPEDICS | Facility: CLINIC | Age: 80
End: 2020-05-12
Payer: COMMERCIAL

## 2020-05-12 DIAGNOSIS — S60.221A CONTUSION OF RIGHT HAND, INITIAL ENCOUNTER: ICD-10-CM

## 2020-05-12 DIAGNOSIS — M05.741 RHEUMATOID ARTHRITIS INVOLVING RIGHT HAND WITH POSITIVE RHEUMATOID FACTOR (H): Primary | ICD-10-CM

## 2020-05-12 PROCEDURE — 99441 ZZC PHYSICIAN TELEPHONE EVALUATION 5-10 MIN: CPT | Performed by: ORTHOPAEDIC SURGERY

## 2020-05-12 NOTE — PROGRESS NOTES
"Ginger Marshall is a 79 year old female who is being evaluated via a billable telephone visit.      The patient has been notified of following:     \"This telephone visit will be conducted via a call between you and your physician/provider. We have found that certain health care needs can be provided without the need for a physical exam.  This service lets us provide the care you need with a short phone conversation.  If a prescription is necessary we can send it directly to your pharmacy.  If lab work is needed we can place an order for that and you can then stop by our lab to have the test done at a later time.    Telephone visits are billed at different rates depending on your insurance coverage. During this emergency period, for some insurers they may be billed the same as an in-person visit.  Please reach out to your insurance provider with any questions.    If during the course of the call the physician/provider feels a telephone visit is not appropriate, you will not be charged for this service.\"    Patient has given verbal consent for Telephone visit?  Yes    What phone number would you like to be contacted at? [phone]    How would you like to obtain your AVS? E-Mail (inform patient AVS not encrypted)    Ginger Marshall is a 79 year old female who is seen as self referral for right wrist pain.  This started with a hard fall she took last Saturday onto a cement floor at home.  She was in her apartment building and getting off an elevator when she fell.  She has had pain radiating deep in the hand along the index and long fingers back toward the wrist and forearm.  She felt it was deep and could not localize it to dorsal or volar surfaces.  She does not report there is significant pain with finger motion.  Much of her pain is at rest.    She has rheumatoid arthritis and has had multiple surgeries on her right hand.  Most of her carpal bones are fused.  She is had silicone replacement of her index and long MCP " joints.  She had distal ulna resection and revision of the long finger MCP prosthesis by Dr. Hilario Redmond on 8/20/2019.      Current x-ray on 4/27/2020 shows no new fractures.  There is resection of the distal ulna, fusion of most of the carpal bones, excision of the index and long MCP joints without a discretely identifiable interposing prosthesis.  We do not have an immediate postop x-ray from last fall to compare.  The MCP joints have definitely changed from an x-ray from February 2018.  The distal ulna was also present at that time, but the fusions were present.      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     History of total knee replacement 7/23/2009     Menopause late 40's     Other chronic pain     joints     Pelvic fracture (H) 5/13/2014     PUD (peptic ulcer disease)      Rheumatoid arteritis (H)      Sleep apnea     Uses a CPAP     Vitamin B12 deficiency        Past Surgical History:   Procedure Laterality Date     ABDOMEN SURGERY      c-sections     ARTHROSCOPY KNEE RT/LT  01/06    left     BACK SURGERY  2013    disc     BREAST BIOPSY, RT/LT      left benign     BREAST SURGERY  90's    lumpectomy     C ANESTH,TOTAL HIP ARTHROPLASTY  2010    Rt hip     C HAND/FINGER SURGERY UNLISTED  11/05    right hand     C NONSPECIFIC PROCEDURE  91    left foot surgery     C NONSPECIFIC PROCEDURE  95    R MCP surgery     C TOTAL KNEE ARTHROPLASTY  2006    left     C/SECTION, LOW TRANSVERSE  66,72    x 2     CATARACT IOL, RT/LT       COLONOSCOPY  2006,2009     EYE SURGERY      cataracs     HC ESOPH/GAS REFLUX TEST W NASAL IMPED >1 HR  2/1/2012    Procedure:ESOPHAGEAL IMPEDENCE FUNCTION TEST WITH 24 HOUR PH GREATER THAN 1 HOUR; Surgeon:KAYKAY JULIO; Location:[facility]     IR CAUDAL EPIDURAL INJECTION SINGLE  5/2/2012    LESI L5-S1 at [facility]     OPTICAL TRACKING SYSTEM FUSION  POSTERIOR SPINE LUMBAR N/A 4/3/2017    Procedure: OPTICAL TRACKING SYSTEM FUSION SPINE POSTERIOR LUMBAR ONE LEVEL;  Surgeon: Anthony Michele MD;  Location: [facility]     SURGICAL HISTORY OF -       right knee total replacement       Family History   Problem Relation Age of Onset     Arthritis Mother      Alzheimer Disease Mother      Hyperlipidemia Mother      Osteoporosis Mother      Heart Disease Father         MI ( from this)     Alcohol/Drug Father      Arthritis Sister      Hypertension Sister      Cancer Son      Diabetes Son      Neurologic Disorder Sister         Schizophrenic     Hypertension Sister      Hyperlipidemia Sister      Mental Illness Sister      Diabetes Son      Other Cancer Son      Glaucoma Daughter        Social History     Socioeconomic History     Marital status: Single     Spouse name: Not on file     Number of children: 3     Years of education: Not on file     Highest education level: Not on file   Occupational History     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   Social Needs     Financial resource strain: Not on file     Food insecurity     Worry: Not on file     Inability: Not on file     Transportation needs     Medical: Not on file     Non-medical: Not on file   Tobacco Use     Smoking status: Former Smoker     Packs/day: 1.00     Years: 41.00     Pack years: 41.00     Types: Cigarettes     Last attempt to quit: 1999     Years since quittin.3     Smokeless tobacco: Never Used     Tobacco comment: former smoker   Substance and Sexual Activity     Alcohol use: Never     Alcohol/week: 0.0 standard drinks     Frequency: Never     Drug use: No     Sexual activity: Never     Partners: Male   Lifestyle     Physical activity     Days per week: Not on file     Minutes per session: Not on file     Stress: Not on file   Relationships     Social connections     Talks on phone: Not on file     Gets together: Not on file     Attends Gnosticism service: Not  on file     Active member of club or organization: Not on file     Attends meetings of clubs or organizations: Not on file     Relationship status: Not on file     Intimate partner violence     Fear of current or ex partner: Not on file     Emotionally abused: Not on file     Physically abused: Not on file     Forced sexual activity: Not on file   Other Topics Concern     Parent/sibling w/ CABG, MI or angioplasty before 65F 55M? No      Service Not Asked     Blood Transfusions Not Asked     Caffeine Concern Yes     Comment: She has 8 cups of coffee in the AM and is done by 8-8:30 AM.     Occupational Exposure Not Asked     Hobby Hazards Not Asked     Sleep Concern Not Asked     Stress Concern Not Asked     Weight Concern Not Asked     Special Diet Not Asked     Back Care Not Asked     Exercise Yes     Comment: Sometimes.  Gordo Redd comes 3 times a week to her building.     Bike Helmet Not Asked     Seat Belt Not Asked     Self-Exams Not Asked   Social History Narrative    She lives in a a senior living apartment building.       Current Outpatient Medications   Medication Sig Dispense Refill     acetaminophen (TYLENOL) 500 MG tablet Take 2 tablets (1,000 mg) by mouth every 8 hours as needed for pain 100 tablet 0     atorvastatin (LIPITOR) 40 MG tablet TAKE ONE TABLET BY MOUTH EVERY DAY 90 tablet 2     Cholecalciferol (VITAMIN D-3 PO) Take 1,000 Units by mouth 2 times daily       Coenzyme Q10 (CO Q 10 PO)        desvenlafaxine (PRISTIQ) 50 MG 24 hr tablet Take 1 tablet (50 mg) by mouth daily 90 tablet 3     FOLIC ACID PO Take 800 mcg by mouth daily       ibuprofen (ADVIL) 200 MG tablet Take 200 mg by mouth every 4 hours as needed for mild pain       Methotrexate, PF, (RASUVO) 25 MG/0.5ML autoinjector Inject 0.5 mLs (25 mg) Subcutaneous every 7 days . Hold for signs of infection, and seek medical attention. 2 mL 6     multivitamin, therapeutic with minerals (MULTI-VITAMIN) TABS Take 1 tablet by mouth  "daily       naproxen sodium 220 MG capsule Take 220 mg by mouth 2 times daily (with meals)       order for DME Equipment being ordered: Up walker 1 Device 0     oxyCODONE (ROXICODONE) 5 MG tablet Take 1 tablet (5 mg) by mouth every 6 hours as needed for moderate to severe pain 60 tablet 0     pantoprazole (PROTONIX) 40 MG EC tablet TAKE ONE TABLET BY MOUTH EVERY DAY 30-60 MINUTES BEFORE A MEAL 90 tablet 3     sucralfate (CARAFATE) 1 GM tablet TAKE ONE TABLET BY MOUTH FOUR TIMES A DAY AS NEEDED FOR HEARTBURN 120 tablet 1     tofacitinib (XELJANZ XR) 11 MG 24 hr tablet Take 1 tablet (11 mg) by mouth daily 90 tablet 3     topiramate (TOPAMAX) 50 MG tablet Take 1 tablet (50 mg) by mouth 2 times daily 180 tablet 11     triamcinolone (KENALOG) 0.1 % external cream Apply topically 2 times daily as needed for irritation 30 g 1     valACYclovir (VALTREX) 500 MG tablet Take 1 tablet (500 mg) by mouth daily 90 tablet 0       Allergies   Allergen Reactions     Abatacept Other (See Comments)     Severe headaches  Migraine     Celebrex [Celecoxib]      Ineffective     Celecoxib Unknown and Nausea     Ethanol      Antihistamines     Orencia [Abatacept]      Headache     Septra [Bactrim]      Sulfa Drugs Nausea and Vomiting     \"deathly ill\"  Allergic to everything with Sulfa in it.     Sulfamethoxazole-Trimethoprim Nausea and Nausea and Vomiting     Tramadol Other (See Comments)     Headache  Migraine headache     Valdecoxib Unknown and Nausea     Made me \"very very ill\", might of been \"cramping\"     Adhesive Tape Rash     Plastic tape  Plastic tapes     Antihistamines, Chlorpheniramine-Type  [Alkylamines] Anxiety and Other (See Comments)       REVIEW OF SYSTEMS:  CONSTITUTIONAL:  NEGATIVE for fever, chills, change in weight, not feeling tired  SKIN:  NEGATIVE for worrisome rashes, no skin lumps, no skin ulcers and no non-healing wounds  EYES:  NEGATIVE for vision changes or irritation.  ENT/MOUTH:  NEGATIVE.  No hearing loss, no " hoarseness, no difficulty swallowing.  RESP:  NEGATIVE. No cough or shortness of breath.  CV:  NEGATIVE for chest pain, palpitations or peripheral edema  GI:  NEGATIVE for nausea, abdominal pain, heartburn, or change in bowel habits  :  Negative. No dysuria, no hematuria  MUSCULOSKELETAL:  See HPI above  NEURO:  NEGATIVE . No headaches, no dizziness,  no numbness  ENDOCRINE:  NEGATIVE for temperature intolerance, skin/hair changes  HEME/ALLERGY/IMMUNE:  NEGATIVE for bleeding problems  PSYCHIATRIC:  NEGATIVE. no anxiety, no depression.     Exam:  She is able to make a fist without significant pain in the hand.  Despite the changes on the index and long MCP joints, she does not feel movement of the fingers into a fist causes significant pain.  Thus I do not think these MCP joints are the source of her current pain.    Assessment: Chronic rheumatoid hand with multiple prior surgeries performed.  Her current pain may just be contusion to the hand.  We will observe this for now.  If pain persists it may be useful to have her see Dr. Redmond to compare her current x-ray to prior x-rays at his office.      Phone call duration:  10 minutes    Kadeem Hunt MD

## 2020-05-22 DIAGNOSIS — Z79.899 HIGH RISK MEDICATION USE: ICD-10-CM

## 2020-05-22 DIAGNOSIS — M05.79 RHEUMATOID ARTHRITIS INVOLVING MULTIPLE SITES WITH POSITIVE RHEUMATOID FACTOR (H): ICD-10-CM

## 2020-05-22 LAB
ALBUMIN SERPL-MCNC: 4.1 G/DL (ref 3.4–5)
ALP SERPL-CCNC: 49 U/L (ref 40–150)
ALT SERPL W P-5'-P-CCNC: 23 U/L (ref 0–50)
AST SERPL W P-5'-P-CCNC: 19 U/L (ref 0–45)
BASOPHILS # BLD AUTO: 0 10E9/L (ref 0–0.2)
BASOPHILS NFR BLD AUTO: 0.1 %
BILIRUB DIRECT SERPL-MCNC: 0.1 MG/DL (ref 0–0.2)
BILIRUB SERPL-MCNC: 0.4 MG/DL (ref 0.2–1.3)
CREAT SERPL-MCNC: 0.64 MG/DL (ref 0.52–1.04)
CRP SERPL-MCNC: <2.9 MG/L (ref 0–8)
DIFFERENTIAL METHOD BLD: ABNORMAL
EOSINOPHIL # BLD AUTO: 0.3 10E9/L (ref 0–0.7)
EOSINOPHIL NFR BLD AUTO: 4.6 %
ERYTHROCYTE [DISTWIDTH] IN BLOOD BY AUTOMATED COUNT: 15.1 % (ref 10–15)
ERYTHROCYTE [SEDIMENTATION RATE] IN BLOOD BY WESTERGREN METHOD: 16 MM/H (ref 0–30)
GFR SERPL CREATININE-BSD FRML MDRD: 86 ML/MIN/{1.73_M2}
HCT VFR BLD AUTO: 36.3 % (ref 35–47)
HGB BLD-MCNC: 11.5 G/DL (ref 11.7–15.7)
LYMPHOCYTES # BLD AUTO: 1.1 10E9/L (ref 0.8–5.3)
LYMPHOCYTES NFR BLD AUTO: 15.2 %
MCH RBC QN AUTO: 30.6 PG (ref 26.5–33)
MCHC RBC AUTO-ENTMCNC: 31.7 G/DL (ref 31.5–36.5)
MCV RBC AUTO: 97 FL (ref 78–100)
MONOCYTES # BLD AUTO: 0.9 10E9/L (ref 0–1.3)
MONOCYTES NFR BLD AUTO: 12.6 %
NEUTROPHILS # BLD AUTO: 4.7 10E9/L (ref 1.6–8.3)
NEUTROPHILS NFR BLD AUTO: 67.5 %
PLATELET # BLD AUTO: 347 10E9/L (ref 150–450)
PROT SERPL-MCNC: 7.7 G/DL (ref 6.8–8.8)
RBC # BLD AUTO: 3.76 10E12/L (ref 3.8–5.2)
WBC # BLD AUTO: 7 10E9/L (ref 4–11)

## 2020-05-22 PROCEDURE — 80076 HEPATIC FUNCTION PANEL: CPT | Performed by: INTERNAL MEDICINE

## 2020-05-22 PROCEDURE — 85652 RBC SED RATE AUTOMATED: CPT | Performed by: INTERNAL MEDICINE

## 2020-05-22 PROCEDURE — 86140 C-REACTIVE PROTEIN: CPT | Performed by: INTERNAL MEDICINE

## 2020-05-22 PROCEDURE — 82565 ASSAY OF CREATININE: CPT | Performed by: INTERNAL MEDICINE

## 2020-05-22 PROCEDURE — 36415 COLL VENOUS BLD VENIPUNCTURE: CPT | Performed by: INTERNAL MEDICINE

## 2020-05-22 PROCEDURE — 85025 COMPLETE CBC W/AUTO DIFF WBC: CPT | Performed by: INTERNAL MEDICINE

## 2020-05-28 ENCOUNTER — VIRTUAL VISIT (OUTPATIENT)
Dept: RHEUMATOLOGY | Facility: CLINIC | Age: 80
End: 2020-05-28
Payer: COMMERCIAL

## 2020-05-28 DIAGNOSIS — M05.79 RHEUMATOID ARTHRITIS INVOLVING MULTIPLE SITES WITH POSITIVE RHEUMATOID FACTOR (H): Primary | Chronic | ICD-10-CM

## 2020-05-28 DIAGNOSIS — Z79.899 HIGH RISK MEDICATION USE: ICD-10-CM

## 2020-05-28 PROCEDURE — 99213 OFFICE O/P EST LOW 20 MIN: CPT | Mod: 95 | Performed by: INTERNAL MEDICINE

## 2020-05-28 RX ORDER — METHOTREXATE 25 MG/.5ML
25 INJECTION, SOLUTION SUBCUTANEOUS
Qty: 2 ML | Refills: 6 | Status: SHIPPED | OUTPATIENT
Start: 2020-05-28 | End: 2020-09-11

## 2020-05-28 RX ORDER — TOFACITINIB 11 MG/1
11 TABLET, FILM COATED, EXTENDED RELEASE ORAL DAILY
Qty: 90 TABLET | Refills: 2 | Status: SHIPPED | OUTPATIENT
Start: 2020-05-28 | End: 2020-12-18

## 2020-05-28 NOTE — PROGRESS NOTES
"Ginger Marshall is a 80 year old female who is being evaluated via a billable telephone visit.      The patient has been notified of following:     \"This telephone visit will be conducted via a call between you and your physician/provider. We have found that certain health care needs can be provided without the need for a physical exam.  This service lets us provide the care you need with a short phone conversation.  If a prescription is necessary we can send it directly to your pharmacy.  If lab work is needed we can place an order for that and you can then stop by our lab to have the test done at a later time.    Telephone visits are billed at different rates depending on your insurance coverage. During this emergency period, for some insurers they may be billed the same as an in-person visit.  Please reach out to your insurance provider with any questions.    If during the course of the call the physician/provider feels a telephone visit is not appropriate, you will not be charged for this service.\"    Patient has given verbal consent for Telephone visit?  Yes    What phone number would you like to be contacted at? [phone]    How would you like to obtain your AVS? [facility]    Rheumatology Telephone/Telehealth Visit      Ginger Marshall MRN# [id number]   YOB: 1940 Age: 80 year old      Date of visit: 5/28/20   PCP: Georgia Vázquez MD     Chief Complaint   Patient presents with:  Arthritis: RA.    Assessment and Plan     1. Seropositive ( [2009], CCP >100 [2009]; hx of rheumatoid nodule by 6/14/2011 pathology) Erosive Rheumatoid Arthritis: Previously failed remicade (staph infection during therapy, but was immediately after a joint injection), orencia (migraines), HCQ (ineffective).  Sulfa allergy.  Currently on methotrexate 25 mg SQ once weekly, folic acid 800 mcg daily, Xeljanz XR 11mg daily.  She also has prednisone 20 mg daily ×5 days to use as needed for flare. Methotrexate was reduced in the " past with worsening symptoms; later increased and changed to SQ for improved bioavailability.  Doing well today  - Continue methotrexate from 25mg SQ once weekly  - Continue folic acid 800mcg daily as noted above (from over-the-counter supplements)  - Continue Xeljanz XR 11mg daily  - Labs in 3 months: CBC, Creatinine, Hepatic Panel, ESR, CRP              Rapid 3, cumulative scores                       10/31/2019: 3.8 (MTX 25mg PO wkly, Xeljanz XR 11mg daily)                        07/11/2019: 8.2 (MTX 25mg PO wkly, HCQ 400mg daily, Xeljanz XR 11mg daily)                        03/06/2019: ?    (MTX 25mg PO wkly, HCQ 400mg daily, Xeljanz XR 11mg daily)                       12/12/2018: 7.5 (MTX 25mg wkly, HCQ 400mg daily, Xeljanz XR 11mg daily)                       08/08/2018: 7    (MTX 22.5mg wkly, Xeljanz XR 11mg daily)                       02/07/2018: 4    (MTX 22.5mg wkly, Xeljanz XR 11mg daily)                       11/08/2017: 5    (MTX 22.5mg wkly, Xeljanz XR 11mg daily)                       08/09/2017: 0    (MTX 22.5mg wkly, Xeljanz XR 11mg daily)                      05/10/2017: 5    (MTX 25mg wkly, Xeljanz XR 11mg daily) RA doing well    2. Right hip pain: Status post WALTER twice in the past. Followed by [facility] orthopedics.     3. Degenerative change of the L-spine that radiates to the left leg; Right trochanteric bursitis: Suspect either right trochanteric bursa altered gait which exacerbated L-spine degenerative symptoms, or the other way around.  Refer to PT; advised seeing neurosurgery again if back pain persists.  Hx of L-spine surgery.     4. Osteoporosis: was previously managed by endocrinology and she received reclast once in 2016.  She does not want to use any osteoporosis medication at this time.  We discussed the tx options; risks of treatment and risks of not treating.  12/12/2018 DEXA ordered by Dr. Vázquez showed osteoporosis.      5. Iron Deficiency Anemia: Managed by PCP.     6. Left  1st toe pain, history: 2 episodes of sudden onset left toe pain followed by diffuse left foot pain that made ambulation difficult.  Also with nodules over both Achilles tendons and the right elbow that are either rheumatoid nodules or tophi.  She reports having history of gout decades ago.  Denies ever being on colchicine or allopurinol.  Discussed colchicine to use if suspected gout flare occurs.  No flares since last seen so has not used colchicine.  - Colchicine: (MITIGARE) 0.6 MG capsule; Take 2 capsules (1.2 mg) by mouth once for 1 dose at the onset of a gout flare, followed by 1 capsule (0.6mg) 1 hour later.  Dispense: 3 capsule; Refill: 0    # Relevant labs and imaging were reviewed with the patient    # High risk medication toxicity monitoring: discussion and labs reviewed; appropriate labs ordered. See above.  Instructed that if confirmed to have COVID-19 or exposure to someone with confirmed COVID-19 to call this clinic for directions on DMARD management.    # Note that this is a virtual visit to reduce the risk of COVID-19 exposure during this current pandemic.      # Considered to be at high risk of complications from the COVID-19 virus.  It is recommended to limit contact with other people and if possible to work remotely or provide a leave of absence to reduce the risk for COVID-19.  I stressed the importance of social distancing and wearing a mask when she is in public.  She says that she is going to refuse to do so despite known risks to herself and the people around her    Ms. Marshall verbalized agreement with and understanding of the rational for the diagnosis and treatment plan.  All questions were answered to best of my ability and the patient's satisfaction. Ms. Marshall was advised to contact the clinic with any questions that may arise after the clinic visit.      Thank you for involving me in the care of the patient    Return to clinic: 3-4 months    HPI   Ginger Marshall is a 80 year old female  with a history of multiple foot surgeries, hand tendon transfers, MCP replacements (most recent being in August 2015), bilateral TKA, right WALTER, left distal radius fracture history, gout, s/p lumbar fusion, osteoporosis and seropositive (RF+, CCP+) erosive rheumatoid arthritis who presents for follow-up of rheumatoid arthritis.      Today, Ms. Marshall reports that she is doing well with regard to rheumatoid arthritis.  Tolerating medications well.  No joint pain/stiffness/swelling. No acute onset flares.   Not social distancing and prefers not to wear a mask    Denies fevers, chills, nausea, vomiting, constipation, diarrhea. No abdominal pain. No chest pain/pressure, palpitations, or shortness of breath. No nasal or oral sores.   No neck pain. No rash. No LE swelling.     Tobacco: quit in 1999  EtOH: 1 glass of wine every month at most  Drugs: None  Occupation: , retired     ROS   GEN: No fevers, chills  SKIN: No itching, rashes, sores  HEENT:  No oral or nasal ulcers.  CV: No chest pain, pressure, palpitations, or dyspnea on exertion.  PULM: No SOB, wheeze, cough.  GI:  No nausea, vomiting, constipation, diarrhea. No blood in stool. No abdominal pain.  : No blood in urine.  MSK: See HPI.  NEURO: No numbness or tingling  EXT: No LE swelling    Active Problem List     Patient Active Problem List   Diagnosis     Rheumatoid arthritis involving right hand with positive rheumatoid factor (H)     Menopause     History of colonic polyps     Mitral Regurgitation     Multinodular goiter     CARDIOVASCULAR SCREENING; LDL GOAL LESS THAN 130     Psychophysiologic insomnia     Pulmonary nodule     PSEUDOPHAKIA OU     PVD (POSTERIOR VITREOUS DETACHMENT) OU     PXF (PSEUDOEXFOLIATION OF LENS CAPSULE) OD     GERD (gastroesophageal reflux disease)     Hyperlipidemia LDL goal <100     Advance Care Planning     Neuropathy     SHERRY (obstructive sleep apnea)-severe (AHI 35)     zEncounter for counseling     Anemia  of chronic disease     Migraine     Osteoporosis     Cornea guttata, ou     Conjunctival concretions     Stenosis, spinal, lumbar     Other chronic pain     Obesity     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     MGD (meibomian gland dysfunction)     Blepharitis of both eyes     Personal history of healed osteoporosis fracture     Iron malabsorption     Hyperglycemia     Chronic bilateral low back pain without sciatica     Allergic state, subsequent encounter     BMI 32.0-32.9,adult     Spinal stenosis of lumbar region without neurogenic claudication     Herniated nucleus pulposus, L3-4     S/P lumbar fusion     Anxiety     Low iron     BMI 28.0-28.9,adult     History of depression     DDD (degenerative disc disease), lumbar     Closed fracture of multiple ribs of left side, initial encounter     Past Medical History     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     History of total knee replacement 7/23/2009     Menopause late 40's     Other chronic pain     joints     Pelvic fracture (H) 5/13/2014     PUD (peptic ulcer disease)      Rheumatoid arteritis (H)      Sleep apnea     Uses a CPAP     Vitamin B12 deficiency      Past Surgical History     Past Surgical History:   Procedure Laterality Date     ABDOMEN SURGERY      c-sections     ARTHROSCOPY KNEE RT/LT  01/06    left     BACK SURGERY  2013    disc     BREAST BIOPSY, RT/LT      left benign     BREAST SURGERY  90's    lumpectomy     C ANESTH,TOTAL HIP ARTHROPLASTY  2010    Rt hip     C HAND/FINGER SURGERY UNLISTED  11/05    right hand     C NONSPECIFIC PROCEDURE  91    left foot surgery     C NONSPECIFIC PROCEDURE  95    R MCP surgery     C TOTAL KNEE ARTHROPLASTY  2006    left     C/SECTION, LOW TRANSVERSE  66,72    x 2     CATARACT IOL, RT/LT       COLONOSCOPY  2006,2009     EYE SURGERY      cataracs     HC  "ESOPH/GAS REFLUX TEST W NASAL IMPED >1 HR  2/1/2012    Procedure:ESOPHAGEAL IMPEDENCE FUNCTION TEST WITH 24 HOUR PH GREATER THAN 1 HOUR; Surgeon:KAYKAY JULIO; Location:[facility]     IR CAUDAL EPIDURAL INJECTION SINGLE  5/2/2012    LESI L5-S1 at [facility]     OPTICAL TRACKING SYSTEM FUSION POSTERIOR SPINE LUMBAR N/A 4/3/2017    Procedure: OPTICAL TRACKING SYSTEM FUSION SPINE POSTERIOR LUMBAR ONE LEVEL;  Surgeon: Anthony Michele MD;  Location: [facility]     SURGICAL HISTORY OF -   2007    right knee total replacement     Allergy     Allergies   Allergen Reactions     Abatacept Other (See Comments)     Severe headaches  Migraine     Celebrex [Celecoxib]      Ineffective     Celecoxib Unknown and Nausea     Ethanol      Antihistamines     Orencia [Abatacept]      Headache     Septra [Bactrim]      Sulfa Drugs Nausea and Vomiting     \"deathly ill\"  Allergic to everything with Sulfa in it.     Sulfamethoxazole-Trimethoprim Nausea and Nausea and Vomiting     Tramadol Other (See Comments)     Headache  Migraine headache     Valdecoxib Unknown and Nausea     Made me \"very very ill\", might of been \"cramping\"     Adhesive Tape Rash     Plastic tape  Plastic tapes     Antihistamines, Chlorpheniramine-Type  [Alkylamines] Anxiety and Other (See Comments)     Current Medication List     Current Outpatient Medications   Medication Sig     acetaminophen (TYLENOL) 500 MG tablet Take 2 tablets (1,000 mg) by mouth every 8 hours as needed for pain     atorvastatin (LIPITOR) 40 MG tablet TAKE ONE TABLET BY MOUTH EVERY DAY     Cholecalciferol (VITAMIN D-3 PO) Take 1,000 Units by mouth 2 times daily     Coenzyme Q10 (CO Q 10 PO)      desvenlafaxine (PRISTIQ) 50 MG 24 hr tablet Take 1 tablet (50 mg) by mouth daily     FOLIC ACID PO Take 800 mcg by mouth daily     ibuprofen (ADVIL) 200 MG tablet Take 200 mg by mouth every 4 hours as needed for mild pain     Methotrexate, PF, (RASUVO) 25 MG/0.5ML autoinjector Inject 0.5 mLs (25 mg) " Subcutaneous every 7 days . Hold for signs of infection, and seek medical attention.     multivitamin, therapeutic with minerals (MULTI-VITAMIN) TABS Take 1 tablet by mouth daily     naproxen sodium 220 MG capsule Take 220 mg by mouth 2 times daily (with meals)     order for DME Equipment being ordered: Up walker     oxyCODONE (ROXICODONE) 5 MG tablet Take 1 tablet (5 mg) by mouth every 6 hours as needed for moderate to severe pain     pantoprazole (PROTONIX) 40 MG EC tablet TAKE ONE TABLET BY MOUTH EVERY DAY 30-60 MINUTES BEFORE A MEAL     sucralfate (CARAFATE) 1 GM tablet TAKE ONE TABLET BY MOUTH FOUR TIMES A DAY AS NEEDED FOR HEARTBURN     tofacitinib (XELJANZ XR) 11 MG 24 hr tablet Take 1 tablet (11 mg) by mouth daily     topiramate (TOPAMAX) 50 MG tablet Take 1 tablet (50 mg) by mouth 2 times daily     triamcinolone (KENALOG) 0.1 % external cream Apply topically 2 times daily as needed for irritation     valACYclovir (VALTREX) 500 MG tablet Take 1 tablet (500 mg) by mouth daily     No current facility-administered medications for this visit.      Social History   See HPI    Family History     Family History   Problem Relation Age of Onset     Arthritis Mother      Alzheimer Disease Mother      Hyperlipidemia Mother      Osteoporosis Mother      Heart Disease Father         MI ( from this)     Alcohol/Drug Father      Arthritis Sister      Hypertension Sister      Cancer Son      Diabetes Son      Neurologic Disorder Sister         Schizophrenic     Hypertension Sister      Hyperlipidemia Sister      Mental Illness Sister      Diabetes Son      Other Cancer Son      Glaucoma Daughter      No change in family history since the previous clinic visit.    Physical Exam     Temp Readings from Last 3 Encounters:   20 98.1  F (36.7  C) (Oral)   20 98  F (36.7  C) (Oral)   20 98.7  F (37.1  C)     BP Readings from Last 5 Encounters:   20 132/58   20 130/85   20 124/68   20  "128/76   01/15/20 122/68     Pulse Readings from Last 1 Encounters:   04/27/20 94     Resp Readings from Last 1 Encounters:   04/27/20 17     Estimated body mass index is 30.92 kg/m  as calculated from the following:    Height as of 4/27/20: 1.702 m (5' 7\").    Weight as of 4/27/20: 89.5 kg (197 lb 6.4 oz).    Telephone Visit     Labs     CBC  Recent Labs   Lab Test 05/22/20  1026 03/27/20  1413 02/27/20  1117   WBC 7.0 5.0 6.4   RBC 3.76* 3.68* 3.77*   HGB 11.5* 11.2* 11.5*   HCT 36.3 34.7* 36.8   MCV 97 94 98   RDW 15.1* 14.7 15.0    356 385   MCH 30.6 30.4 30.5   MCHC 31.7 32.3 31.3*   NEUTROPHIL 67.5 44.7 58.3   LYMPH 15.2 31.7 18.7   MONOCYTE 12.6 17.0 17.1   EOSINOPHIL 4.6 6.4 5.7   BASOPHIL 0.1 0.2 0.2   ANEU 4.7 2.2 3.7   ALYM 1.1 1.6 1.2   MARYURI 0.9 0.9 1.1   AEOS 0.3 0.3 0.4   ABAS 0.0 0.0 0.0     CMP  Recent Labs   Lab Test 05/22/20  1026 03/27/20  1413 02/27/20  1117 12/19/19  0957  08/15/19  1118   NA  --  140  --  142  --  144   POTASSIUM  --  3.4  --  3.8  --  3.5   CHLORIDE  --  112*  --  111*  --  112*   CO2  --  24  --  24  --  25   ANIONGAP  --  4  --  7  --  7   GLC  --  98  --  88  --  95   BUN  --  18  --  10  --  16   CR 0.64 0.57 0.66 0.68   < > 0.57   GFRESTIMATED 86 89 85 84   < > 89   GFRESTBLACK >90 >90 >90 >90   < > >90   BRANDEE  --  9.1  --  9.4  --  9.6   BILITOTAL 0.4  --  0.3 0.3   < >  --    ALBUMIN 4.1  --  3.6 4.0   < >  --    PROTTOTAL 7.7  --  7.5 7.6   < >  --    ALKPHOS 49  --  47 45   < >  --    AST 19  --  18 19   < >  --    ALT 23  --  19 22   < >  --     < > = values in this interval not displayed.     Calcium/VitaminD  Recent Labs   Lab Test 03/27/20  1413 12/19/19  0957 08/15/19  1118 03/06/19  1351  10/28/16  1239  02/05/13  1050   BRANDEE 9.1 9.4 9.6 9.3   < > 9.4   < > 9.3   VITDT  --   --   --  41  --  37  --  33    < > = values in this interval not displayed.     ESR/CRP  Recent Labs   Lab Test 05/22/20  1026 02/27/20  1117 09/12/19  1019   SED 16 37* 34*   CRP <2.9 " "<2.9 <2.9     Lipid Panel  Recent Labs   Lab Test 12/19/19  0957 11/23/18  1309 12/14/17  0957 10/28/16  1238  01/13/14  1109 07/24/13  1024  04/13/12  0831   CHOL 166  --  164 176   < > 135 177  --  167   TRIG 113  --  121 103   < > 89 80  --  115   HDL 89  --  79 77   < > 49* 89  --  59   LDL 54 72 61 78   < > 68 72   < > 85   VLDL  --   --   --   --   --  18 16  --  23   CHOLHDLRATIO  --   --   --   --   --  2.8 2.0  --  2.8   NHDL 77  --  85 99   < >  --   --   --   --     < > = values in this interval not displayed.     Hepatitis B  Recent Labs   Lab Test 09/15/15  1159 04/30/12  1005   AUSAB 0.11  --    HBCAB Nonreactive  --    HEPBANG Nonreactive Negative     Hepatitis C  Recent Labs   Lab Test 09/15/15  1159 04/30/12  1005   HCVAB Nonreactive   Assay performance characteristics have not been established for newborns,   infants, and children   Negative     Tuberculosis Screening  Recent Labs   Lab Test 05/07/18  1033 09/15/15  1200 04/30/12  1006   TBRSLT Negative Negative Negative   TBAGN 0.00 0.00 0.00     \"HAND BILATERAL THREE OR MORE VIEWS 9/15/2015 12:28 PM   HISTORY: Rheumatoid arthritis; establish baseline. Rheumatoid  arthritis(714.0)  COMPARISON: None  IMPRESSION  IMPRESSION: There is diffuse osteopenia. Postoperative changes of the  right second and third metacarpophalangeal joints. Moderate joint  space narrowing involving the left second and third  metacarpophalangeal joints. Mild joint space narrowing of the right  fourth and fifth metacarpophalangeal joints. There are also small  periarticular erosive changes of the metacarpophalangeal joints. There  is fusion of several of the right carpal bones. Marked joint space  loss in the left wrist. Findings are consistent with the clinical  history of rheumatoid arthritis. Chronic fracture deformities of the  right distal radius and ulna. No acute fracture is seen.  SPENCER MONSALVE MD\"    Immunization History     Immunization History   Administered " Date(s) Administered     FLU 6-35 months 10/24/2006, 11/14/2007, 10/22/2008, 10/21/2009     Flu, Unspecified 10/20/2013     Influenza (High Dose) 3 valent vaccine 10/28/2013, 09/23/2014, 11/11/2015, 09/23/2016, 09/24/2019     Influenza (IIV3) PF 10/12/1999, 10/26/2001, 10/19/2002, 10/30/2003, 10/28/2004, 10/21/2005, 10/26/2007, 10/21/2009, 10/05/2011, 10/13/2012     Influenza Vaccine Im 4yrs+ 4 Valent CCIIV4 09/28/2017, 09/27/2018     Mantoux Tuberculin Skin Test 11/03/2006     Pneumo Conj 13-V (2010&after) 07/29/2015     Pneumococcal 23 valent 06/26/2000, 10/26/2001, 06/01/2007, 06/02/2010     TD (ADULT, 7+) 12/10/2008, 07/20/2009     Tdap (Adult) Unspecified Formulation 12/12/2018     Zoster vaccine recombinant adjuvanted (SHINGRIX) 12/12/2018, 02/14/2019          Chart documentation done in part with Dragon Voice recognition Software. Although reviewed after completion, some word and grammatical error may remain.    Nico Byers MD    Phone call start time: 10:45 AM  Phone call end time: 11:00 AM    This visit is equivalent to a 83364 visit    Location of patient: home  Location of provider: home    Follow up:  follow up appointment scheduled to be in September    Nico Byers MD  5/28/2020

## 2020-05-29 DIAGNOSIS — E78.5 HYPERLIPIDEMIA LDL GOAL <100: Chronic | ICD-10-CM

## 2020-05-31 RX ORDER — ATORVASTATIN CALCIUM 40 MG/1
TABLET, FILM COATED ORAL
Qty: 90 TABLET | Refills: 1 | Status: SHIPPED | OUTPATIENT
Start: 2020-05-31 | End: 2020-11-27

## 2020-05-31 NOTE — TELEPHONE ENCOUNTER
Prescription approved per [facility] Refill Protocol.  Carmen Cheek RN

## 2020-06-11 ENCOUNTER — TELEPHONE (OUTPATIENT)
Dept: FAMILY MEDICINE | Facility: CLINIC | Age: 80
End: 2020-06-11

## 2020-06-11 NOTE — TELEPHONE ENCOUNTER
Per patient, she has noticed increased depression over the past 6 months  Does not have a current psychiatrist or psychologist.   Denies any suicidal thoughts    Offered virtual visit but patient declined  She wants to do a face to face visit and sit down to talk with provider  Wants to see Dahiana Flores NP only    Appointment scheduled with Dahiana Flores NP for 6/16/2020    Zackary Pelaez RN

## 2020-06-11 NOTE — TELEPHONE ENCOUNTER
Reason for call:  Symptom   Symptom or request: Depression     Duration (how long have symptoms been present): a few months   Have you been treated for this before? Yes    Additional comments: na    Phone number to reach patient:  Home number on file [phone] (home)    Best Time:  any    Can we leave a detailed message on this number?  YES    Travel screening: Not Applicable

## 2020-06-15 NOTE — PROGRESS NOTES
Subjective     Ginger Marshall is a 80 year old female who presents to clinic today for the following health issues:    HPI   Depression and Anxiety Follow-Up    How are you doing with your depression since your last visit? Worsened     How are you doing with your anxiety since your last visit?  Worsened     Are you having other symptoms that might be associated with depression or anxiety? Yes:  angry, not happy about anything    Have you had a significant life event? Housing Concerns     Do you have any concerns with your use of alcohol or other drugs? No    Social History     Tobacco Use     Smoking status: Former Smoker     Packs/day: 1.00     Years: 41.00     Pack years: 41.00     Types: Cigarettes     Last attempt to quit: 1999     Years since quittin.4     Smokeless tobacco: Never Used     Tobacco comment: former smoker   Substance Use Topics     Alcohol use: Never     Alcohol/week: 0.0 standard drinks     Frequency: Never     Drug use: No     PHQ 2018 2019 10/16/2019   PHQ-9 Total Score 4 10 9   Q9: Thoughts of better off dead/self-harm past 2 weeks Not at all Not at all Not at all     SPRING-7 SCORE 2/10/2017 2018 2019   Total Score 1 0 0     Last PHQ-9 10/16/2019   1.  Little interest or pleasure in doing things 2   2.  Feeling down, depressed, or hopeless 2   3.  Trouble falling or staying asleep, or sleeping too much 0   4.  Feeling tired or having little energy 2   5.  Poor appetite or overeating 2   6.  Feeling bad about yourself 0   7.  Trouble concentrating 1   8.  Moving slowly or restless 0   Q9: Thoughts of better off dead/self-harm past 2 weeks 0   PHQ-9 Total Score 9   Difficulty at work, home, or with people Somewhat difficult     SPRING-7  2019   1. Feeling nervous, anxious, or on edge 0   2. Not being able to stop or control worrying 0   3. Worrying too much about different things 0   4. Trouble relaxing 0   5. Being so restless that it is hard to sit still 0   6.  Becoming easily annoyed or irritable 0   7. Feeling afraid, as if something awful might happen 0   SPRING-7 Total Score 0   If you checked any problems, how difficult have they made it for you to do your work, take care of things at home, or get along with other people? Not difficult at all     In the past two weeks have you had thoughts of suicide or self-harm?  No.    Do you have concerns about your personal safety or the safety of others?   No    Suicide Assessment Five-step Evaluation and Treatment (SAFE-T)      How many servings of fruits and vegetables do you eat daily?  2-3    On average, how many sweetened beverages do you drink each day (Examples: soda, juice, sweet tea, etc.  Do NOT count diet or artificially sweetened beverages)?   1    How many days per week do you exercise enough to make your heart beat faster? 3 or less    How many minutes a day do you exercise enough to make your heart beat faster? 9 or less    How many days per week do you miss taking your medication? 0    Patient notes anhedonia, oversleeping, poor appetite, and increased irritability.  She notes that she is incredibly angry and irritated at her living situation.  She feels she can't move, because she won't be able to keep her animals.  She feels her animals help improve her mental health.        Patient Active Problem List   Diagnosis     Rheumatoid arthritis involving right hand with positive rheumatoid factor (H)     Menopause     History of colonic polyps     Mitral Regurgitation     Multinodular goiter     CARDIOVASCULAR SCREENING; LDL GOAL LESS THAN 130     Psychophysiologic insomnia     Pulmonary nodule     PSEUDOPHAKIA OU     PVD (POSTERIOR VITREOUS DETACHMENT) OU     PXF (PSEUDOEXFOLIATION OF LENS CAPSULE) OD     GERD (gastroesophageal reflux disease)     Hyperlipidemia LDL goal <100     Advance Care Planning     Neuropathy     SHERRY (obstructive sleep apnea)-severe (AHI 35)     zEncounter for counseling     Anemia of chronic  disease     Migraine     Osteoporosis     Cornea guttata, ou     Conjunctival concretions     Stenosis, spinal, lumbar     Other chronic pain     Obesity     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     MGD (meibomian gland dysfunction)     Blepharitis of both eyes     Personal history of healed osteoporosis fracture     Iron malabsorption     Hyperglycemia     Chronic bilateral low back pain without sciatica     Allergic state, subsequent encounter     BMI 32.0-32.9,adult     Spinal stenosis of lumbar region without neurogenic claudication     Herniated nucleus pulposus, L3-4     S/P lumbar fusion     Anxiety     Low iron     BMI 28.0-28.9,adult     History of depression     DDD (degenerative disc disease), lumbar     Closed fracture of multiple ribs of left side, initial encounter     Past Surgical History:   Procedure Laterality Date     ABDOMEN SURGERY      c-sections     ARTHROSCOPY KNEE RT/LT  01/06    left     BACK SURGERY  2013    disc     BREAST BIOPSY, RT/LT      left benign     BREAST SURGERY  90's    lumpectomy     C ANESTH,TOTAL HIP ARTHROPLASTY  2010    Rt hip     C HAND/FINGER SURGERY UNLISTED  11/05    right hand     C NONSPECIFIC PROCEDURE  91    left foot surgery     C NONSPECIFIC PROCEDURE  95    R MCP surgery     C TOTAL KNEE ARTHROPLASTY  2006    left     C/SECTION, LOW TRANSVERSE  66,72    x 2     CATARACT IOL, RT/LT       COLONOSCOPY  2006,2009     EYE SURGERY      cataracs     HC ESOPH/GAS REFLUX TEST W NASAL IMPED >1 HR  2/1/2012    Procedure:ESOPHAGEAL IMPEDENCE FUNCTION TEST WITH 24 HOUR PH GREATER THAN 1 HOUR; Surgeon:KAYKAY JULIO; [facility]     IR CAUDAL EPIDURAL INJECTION SINGLE  5/2/2012    LESI L5-S1 at [facility]     OPTICAL TRACKING SYSTEM FUSION POSTERIOR SPINE LUMBAR N/A 4/3/2017    Procedure: OPTICAL TRACKING SYSTEM FUSION SPINE POSTERIOR LUMBAR ONE LEVEL;  Surgeon: Anthony Michele MD;  Location:  OR     SURGICAL HISTORY OF -   2007    right knee  total replacement       Social History     Tobacco Use     Smoking status: Former Smoker     Packs/day: 1.00     Years: 41.00     Pack years: 41.00     Types: Cigarettes     Last attempt to quit: 1999     Years since quittin.4     Smokeless tobacco: Never Used     Tobacco comment: former smoker   Substance Use Topics     Alcohol use: Never     Alcohol/week: 0.0 standard drinks     Frequency: Never     Family History   Problem Relation Age of Onset     Arthritis Mother      Alzheimer Disease Mother      Hyperlipidemia Mother      Osteoporosis Mother      Heart Disease Father         MI ( from this)     Alcohol/Drug Father      Arthritis Sister      Hypertension Sister      Cancer Son      Diabetes Son      Neurologic Disorder Sister         Schizophrenic     Hypertension Sister      Hyperlipidemia Sister      Mental Illness Sister      Diabetes Son      Other Cancer Son      Glaucoma Daughter          Current Outpatient Medications   Medication Sig Dispense Refill     acetaminophen (TYLENOL) 500 MG tablet Take 2 tablets (1,000 mg) by mouth every 8 hours as needed for pain 100 tablet 0     atorvastatin (LIPITOR) 40 MG tablet TAKE ONE TABLET BY MOUTH EVERY DAY 90 tablet 1     Cholecalciferol (VITAMIN D-3 PO) Take 1,000 Units by mouth 2 times daily       Coenzyme Q10 (CO Q 10 PO)        desvenlafaxine (PRISTIQ) 100 MG 24 hr tablet Take 1 tablet (100 mg) by mouth daily 90 tablet 1     FOLIC ACID PO Take 800 mcg by mouth daily       ibuprofen (ADVIL) 200 MG tablet Take 200 mg by mouth every 4 hours as needed for mild pain       Methotrexate, PF, (RASUVO) 25 MG/0.5ML autoinjector Inject 0.5 mLs (25 mg) Subcutaneous every 7 days . Hold for signs of infection, and seek medical attention. 2 mL 6     multivitamin, therapeutic with minerals (MULTI-VITAMIN) TABS Take 1 tablet by mouth daily       naproxen sodium 220 MG capsule Take 220 mg by mouth 2 times daily (with meals)       order for DME Equipment being  "ordered: Up walker 1 Device 0     oxyCODONE (ROXICODONE) 5 MG tablet Take 1 tablet (5 mg) by mouth every 6 hours as needed for moderate to severe pain 60 tablet 0     pantoprazole (PROTONIX) 40 MG EC tablet TAKE ONE TABLET BY MOUTH EVERY DAY 30-60 MINUTES BEFORE A MEAL 90 tablet 3     sucralfate (CARAFATE) 1 GM tablet TAKE ONE TABLET BY MOUTH FOUR TIMES A DAY AS NEEDED FOR HEARTBURN 120 tablet 1     tofacitinib (XELJANZ XR) 11 MG 24 hr tablet Take 1 tablet (11 mg) by mouth daily 90 tablet 2     topiramate (TOPAMAX) 50 MG tablet Take 1 tablet (50 mg) by mouth 2 times daily 180 tablet 11     triamcinolone (KENALOG) 0.1 % external cream Apply topically 2 times daily as needed for irritation 30 g 1     valACYclovir (VALTREX) 500 MG tablet Take 1 tablet (500 mg) by mouth daily 90 tablet 0     Allergies   Allergen Reactions     Abatacept Other (See Comments)     Severe headaches  Migraine     Celebrex [Celecoxib]      Ineffective     Celecoxib Unknown and Nausea     Ethanol      Antihistamines     Orencia [Abatacept]      Headache     Septra [Bactrim]      Sulfa Drugs Nausea and Vomiting     \"deathly ill\"  Allergic to everything with Sulfa in it.     Sulfamethoxazole-Trimethoprim Nausea and Nausea and Vomiting     Tramadol Other (See Comments)     Headache  Migraine headache     Valdecoxib Unknown and Nausea     Made me \"very very ill\", might of been \"cramping\"     Adhesive Tape Rash     Plastic tape  Plastic tapes     Antihistamines, Chlorpheniramine-Type  [Alkylamines] Anxiety and Other (See Comments)     BP Readings from Last 3 Encounters:   06/16/20 128/74   03/27/20 132/58   03/21/20 130/85    Wt Readings from Last 3 Encounters:   06/16/20 88.4 kg (194 lb 12.8 oz)   04/27/20 89.5 kg (197 lb 6.4 oz)   03/27/20 89.8 kg (198 lb)                      Reviewed and updated as needed this visit by Provider         Review of Systems   Constitutional, HEENT, cardiovascular, pulmonary, gi and gu systems are negative, except as " otherwise noted.      Objective    There were no vitals taken for this visit.  There is no height or weight on file to calculate BMI.  Physical Exam   GENERAL: healthy, alert and no distress  MS: no gross musculoskeletal defects noted, no edema  PSYCH: mentation appears normal, fatigued, judgement and insight intact and appearance well groomed    Diagnostic Test Results:  none         Assessment & Plan     1. Moderate episode of recurrent major depressive disorder (H)  Increase pristiq as below.  Letter given to keep animals with her in her apartment for mental health reasons.  - desvenlafaxine (PRISTIQ) 100 MG 24 hr tablet; Take 1 tablet (100 mg) by mouth daily  Dispense: 90 tablet; Refill: 1    2. Anxiety    - desvenlafaxine (PRISTIQ) 100 MG 24 hr tablet; Take 1 tablet (100 mg) by mouth daily  Dispense: 90 tablet; Refill: 1               Return in about 4 weeks (around 7/14/2020) for Medication Recheck- pristiq- phone or in person visit.    AKIRA Jones [facility]

## 2020-06-16 ENCOUNTER — OFFICE VISIT (OUTPATIENT)
Dept: FAMILY MEDICINE | Facility: CLINIC | Age: 80
End: 2020-06-16
Payer: COMMERCIAL

## 2020-06-16 VITALS
HEIGHT: 67 IN | SYSTOLIC BLOOD PRESSURE: 128 MMHG | HEART RATE: 107 BPM | WEIGHT: 194.8 LBS | DIASTOLIC BLOOD PRESSURE: 74 MMHG | OXYGEN SATURATION: 95 % | BODY MASS INDEX: 30.57 KG/M2 | RESPIRATION RATE: 16 BRPM | TEMPERATURE: 97.8 F

## 2020-06-16 DIAGNOSIS — F41.9 ANXIETY: ICD-10-CM

## 2020-06-16 DIAGNOSIS — F33.1 MODERATE EPISODE OF RECURRENT MAJOR DEPRESSIVE DISORDER (H): Primary | ICD-10-CM

## 2020-06-16 PROCEDURE — 99213 OFFICE O/P EST LOW 20 MIN: CPT | Performed by: NURSE PRACTITIONER

## 2020-06-16 RX ORDER — DESVENLAFAXINE 100 MG/1
100 TABLET, EXTENDED RELEASE ORAL DAILY
Qty: 90 TABLET | Refills: 1 | Status: SHIPPED | OUTPATIENT
Start: 2020-06-16 | End: 2021-01-11

## 2020-06-16 ASSESSMENT — ANXIETY QUESTIONNAIRES
2. NOT BEING ABLE TO STOP OR CONTROL WORRYING: SEVERAL DAYS
1. FEELING NERVOUS, ANXIOUS, OR ON EDGE: MORE THAN HALF THE DAYS
7. FEELING AFRAID AS IF SOMETHING AWFUL MIGHT HAPPEN: NOT AT ALL
IF YOU CHECKED OFF ANY PROBLEMS ON THIS QUESTIONNAIRE, HOW DIFFICULT HAVE THESE PROBLEMS MADE IT FOR YOU TO DO YOUR WORK, TAKE CARE OF THINGS AT HOME, OR GET ALONG WITH OTHER PEOPLE: VERY DIFFICULT
6. BECOMING EASILY ANNOYED OR IRRITABLE: NEARLY EVERY DAY
GAD7 TOTAL SCORE: 7
3. WORRYING TOO MUCH ABOUT DIFFERENT THINGS: SEVERAL DAYS
5. BEING SO RESTLESS THAT IT IS HARD TO SIT STILL: NOT AT ALL

## 2020-06-16 ASSESSMENT — MIFFLIN-ST. JEOR: SCORE: 1396.24

## 2020-06-16 ASSESSMENT — PATIENT HEALTH QUESTIONNAIRE - PHQ9
5. POOR APPETITE OR OVEREATING: NOT AT ALL
SUM OF ALL RESPONSES TO PHQ QUESTIONS 1-9: 17

## 2020-06-16 NOTE — LETTER
June 16, 2020      Ginger Albertss  [address]   [place] [address]        To Whom It May Concern,      The above patient suffers from depression and requires the presence of her dog and cat to improve her mental health.          Sincerely,        AKIRA Jones CNP

## 2020-06-17 ASSESSMENT — ANXIETY QUESTIONNAIRES: GAD7 TOTAL SCORE: 7

## 2020-07-02 DIAGNOSIS — B00.9 HERPES SIMPLEX VIRUS INFECTION: ICD-10-CM

## 2020-07-03 RX ORDER — VALACYCLOVIR HYDROCHLORIDE 500 MG/1
TABLET, FILM COATED ORAL
Qty: 90 TABLET | Refills: 0 | Status: SHIPPED | OUTPATIENT
Start: 2020-07-03 | End: 2020-09-25

## 2020-07-03 NOTE — TELEPHONE ENCOUNTER
Prescription approved per [facility] [place] [place] Refill Protocol.  Carmen Cheek RN

## 2020-07-16 NOTE — PROGRESS NOTES
"Ginger Marshall is a 80 year old female who is being evaluated via a billable telephone visit.      The patient has been notified of following:     \"This telephone visit will be conducted via a call between you and your physician/provider. We have found that certain health care needs can be provided without the need for a physical exam.  This service lets us provide the care you need with a short phone conversation.  If a prescription is necessary we can send it directly to your pharmacy.  If lab work is needed we can place an order for that and you can then stop by our lab to have the test done at a later time.    Telephone visits are billed at different rates depending on your insurance coverage. During this emergency period, for some insurers they may be billed the same as an in-person visit.  Please reach out to your insurance provider with any questions.    If during the course of the call the physician/provider feels a telephone visit is not appropriate, you will not be charged for this service.\"    Patient has given verbal consent for Telephone visit?  Yes    What phone number would you like to be contacted at? [phone]    How would you like to obtain your AVS? Rosina Allen     Ginger Marshall is a 80 year old female who presents via phone visit today for the following health issues:    HPI    Medication Followup of Pristiq 100 mg    Taking Medication as prescribed: yes    Side Effects:  None    Medication Helping Symptoms:  yes       Patient notes that she his feeling better on Pristiq.  She notes that she is no longer depressed and is not irritable.  She is sleeping well.  Patient denies appetite changes.  She denies anxiety.  Patient continues to look for a new place to live, but feels content to live in her current apartment at this time.       Patient Active Problem List   Diagnosis     Rheumatoid arthritis involving right hand with positive rheumatoid factor (H)     Menopause     History of " colonic polyps     Mitral Regurgitation     Multinodular goiter     CARDIOVASCULAR SCREENING; LDL GOAL LESS THAN 130     Psychophysiologic insomnia     Pulmonary nodule     PSEUDOPHAKIA OU     PVD (POSTERIOR VITREOUS DETACHMENT) OU     PXF (PSEUDOEXFOLIATION OF LENS CAPSULE) OD     GERD (gastroesophageal reflux disease)     Hyperlipidemia LDL goal <100     Advance Care Planning     Neuropathy     SHERRY (obstructive sleep apnea)-severe (AHI 35)     zEncounter for counseling     Anemia of chronic disease     Migraine     Osteoporosis     Cornea guttata, ou     Conjunctival concretions     Stenosis, spinal, lumbar     Other chronic pain     Obesity     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     MGD (meibomian gland dysfunction)     Blepharitis of both eyes     Personal history of healed osteoporosis fracture     Iron malabsorption     Hyperglycemia     Chronic bilateral low back pain without sciatica     Allergic state, subsequent encounter     BMI 32.0-32.9,adult     Spinal stenosis of lumbar region without neurogenic claudication     Herniated nucleus pulposus, L3-4     S/P lumbar fusion     Anxiety     Low iron     BMI 28.0-28.9,adult     History of depression     DDD (degenerative disc disease), lumbar     Closed fracture of multiple ribs of left side, initial encounter     Past Surgical History:   Procedure Laterality Date     ABDOMEN SURGERY      c-sections     ARTHROSCOPY KNEE RT/LT  01/06    left     BACK SURGERY  2013    disc     BREAST BIOPSY, RT/LT      left benign     BREAST SURGERY  90's    lumpectomy     C ANESTH,TOTAL HIP ARTHROPLASTY  2010    Rt hip     C HAND/FINGER SURGERY UNLISTED  11/05    right hand     C NONSPECIFIC PROCEDURE  91    left foot surgery     C NONSPECIFIC PROCEDURE  95    R MCP surgery     C TOTAL KNEE ARTHROPLASTY  2006    left     C/SECTION, LOW TRANSVERSE  66,72    x 2     CATARACT IOL, RT/LT       COLONOSCOPY  2006,2009     EYE SURGERY      cataracs     HC ESOPH/GAS  REFLUX TEST W NASAL IMPED >1 HR  2012    Procedure:ESOPHAGEAL IMPEDENCE FUNCTION TEST WITH 24 HOUR PH GREATER THAN 1 HOUR; Surgeon:KAYKAY JULIO; Location:[facility]     IR CAUDAL EPIDURAL INJECTION SINGLE  2012    LESI L5-S1 at [facility]     OPTICAL TRACKING SYSTEM FUSION POSTERIOR SPINE LUMBAR N/A 4/3/2017    Procedure: OPTICAL TRACKING SYSTEM FUSION SPINE POSTERIOR LUMBAR ONE LEVEL;  Surgeon: Anthony Michele MD;  Location: [facility]     SURGICAL HISTORY OF -       right knee total replacement       Social History     Tobacco Use     Smoking status: Former Smoker     Packs/day: 1.00     Years: 41.00     Pack years: 41.00     Types: Cigarettes     Last attempt to quit: 1999     Years since quittin.5     Smokeless tobacco: Never Used     Tobacco comment: former smoker   Substance Use Topics     Alcohol use: Never     Alcohol/week: 0.0 standard drinks     Frequency: Never     Family History   Problem Relation Age of Onset     Arthritis Mother      Alzheimer Disease Mother      Hyperlipidemia Mother      Osteoporosis Mother      Heart Disease Father         MI ( from this)     Alcohol/Drug Father      Arthritis Sister      Hypertension Sister      Cancer Son      Diabetes Son      Neurologic Disorder Sister         Schizophrenic     Hypertension Sister      Hyperlipidemia Sister      Mental Illness Sister      Diabetes Son      Other Cancer Son      Glaucoma Daughter          Current Outpatient Medications   Medication Sig Dispense Refill     acetaminophen (TYLENOL) 500 MG tablet Take 2 tablets (1,000 mg) by mouth every 8 hours as needed for pain 100 tablet 0     atorvastatin (LIPITOR) 40 MG tablet TAKE ONE TABLET BY MOUTH EVERY DAY 90 tablet 1     Cholecalciferol (VITAMIN D-3 PO) Take 1,000 Units by mouth 2 times daily       Coenzyme Q10 (CO Q 10 PO)        desvenlafaxine (PRISTIQ) 100 MG 24 hr tablet Take 1 tablet (100 mg) by mouth daily 90 tablet 1     FOLIC ACID PO Take 800 mcg by  "mouth daily       ibuprofen (ADVIL) 200 MG tablet Take 200 mg by mouth every 4 hours as needed for mild pain       Methotrexate, PF, (RASUVO) 25 MG/0.5ML autoinjector Inject 0.5 mLs (25 mg) Subcutaneous every 7 days . Hold for signs of infection, and seek medical attention. 2 mL 6     multivitamin, therapeutic with minerals (MULTI-VITAMIN) TABS Take 1 tablet by mouth daily       naproxen sodium 220 MG capsule Take 220 mg by mouth 2 times daily (with meals)       order for DME Equipment being ordered: Up walker 1 Device 0     oxyCODONE (ROXICODONE) 5 MG tablet Take 1 tablet (5 mg) by mouth every 6 hours as needed for moderate to severe pain 60 tablet 0     pantoprazole (PROTONIX) 40 MG EC tablet TAKE ONE TABLET BY MOUTH EVERY DAY 30-60 MINUTES BEFORE A MEAL 90 tablet 3     sucralfate (CARAFATE) 1 GM tablet TAKE ONE TABLET BY MOUTH FOUR TIMES A DAY AS NEEDED FOR HEARTBURN 120 tablet 1     tofacitinib (XELJANZ XR) 11 MG 24 hr tablet Take 1 tablet (11 mg) by mouth daily 90 tablet 2     topiramate (TOPAMAX) 50 MG tablet Take 1 tablet (50 mg) by mouth 2 times daily 180 tablet 11     triamcinolone (KENALOG) 0.1 % external cream Apply topically 2 times daily as needed for irritation 30 g 1     valACYclovir (VALTREX) 500 MG tablet TAKE ONE TABLET BY MOUTH ONCE DAILY 90 tablet 0     Allergies   Allergen Reactions     Abatacept Other (See Comments)     Severe headaches  Migraine     Celebrex [Celecoxib]      Ineffective     Celecoxib Unknown and Nausea     Ethanol      Antihistamines     Orencia [Abatacept]      Headache     Septra [Bactrim]      Sulfa Drugs Nausea and Vomiting     \"deathly ill\"  Allergic to everything with Sulfa in it.     Sulfamethoxazole-Trimethoprim Nausea and Nausea and Vomiting     Tramadol Other (See Comments)     Headache  Migraine headache     Valdecoxib Unknown and Nausea     Made me \"very very ill\", might of been \"cramping\"     Adhesive Tape Rash     Plastic tape  Plastic tapes     Antihistamines, " Chlorpheniramine-Type  [Alkylamines] Anxiety and Other (See Comments)     BP Readings from Last 3 Encounters:   06/16/20 128/74   03/27/20 132/58   03/21/20 130/85    Wt Readings from Last 3 Encounters:   06/16/20 88.4 kg (194 lb 12.8 oz)   04/27/20 89.5 kg (197 lb 6.4 oz)   03/27/20 89.8 kg (198 lb)                    Reviewed and updated as needed this visit by Provider  Tobacco  Allergies  Meds  Problems  Med Hx  Surg Hx  Fam Hx         Review of Systems   Constitutional, HEENT, cardiovascular, pulmonary, gi and gu systems are negative, except as otherwise noted.       Objective   Reported vitals:  There were no vitals taken for this visit.   healthy, alert and no distress  PSYCH: Alert and oriented times 3; coherent speech, normal   rate and volume, able to articulate logical thoughts, able   to abstract reason, no tangential thoughts, no hallucinations   or delusions  Her affect is normal  RESP: No cough, no audible wheezing, able to talk in full sentences  Remainder of exam unable to be completed due to telephone visits    Diagnostic Test Results:  none         Assessment/Plan:    1. Moderate episode of recurrent major depressive disorder (H)  Stable.  Continue current treatment plan and medications.     2. Anxiety  Stable.  Continue current treatment plan and medications.       Return in about 3 months (around 10/17/2020) for Physical Exam, with PCP..      Phone call duration:  5 minutes    AKIRA Jones CNP

## 2020-07-17 ENCOUNTER — VIRTUAL VISIT (OUTPATIENT)
Dept: FAMILY MEDICINE | Facility: CLINIC | Age: 80
End: 2020-07-17
Payer: COMMERCIAL

## 2020-07-17 DIAGNOSIS — F33.1 MODERATE EPISODE OF RECURRENT MAJOR DEPRESSIVE DISORDER (H): Primary | ICD-10-CM

## 2020-07-17 DIAGNOSIS — F41.9 ANXIETY: ICD-10-CM

## 2020-07-17 PROCEDURE — 99213 OFFICE O/P EST LOW 20 MIN: CPT | Mod: 95 | Performed by: NURSE PRACTITIONER

## 2020-08-18 ENCOUNTER — OFFICE VISIT (OUTPATIENT)
Dept: OPHTHALMOLOGY | Facility: CLINIC | Age: 80
End: 2020-08-18
Payer: COMMERCIAL

## 2020-08-18 DIAGNOSIS — M05.79 RHEUMATOID ARTHRITIS INVOLVING MULTIPLE SITES WITH POSITIVE RHEUMATOID FACTOR (H): Chronic | ICD-10-CM

## 2020-08-18 DIAGNOSIS — H26.8 PXF (PSEUDOEXFOLIATION OF LENS CAPSULE): ICD-10-CM

## 2020-08-18 DIAGNOSIS — H18.519 CORNEA GUTTATA: ICD-10-CM

## 2020-08-18 DIAGNOSIS — M05.79 RHEUMATOID ARTHRITIS INVOLVING MULTIPLE SITES WITH POSITIVE RHEUMATOID FACTOR (H): ICD-10-CM

## 2020-08-18 DIAGNOSIS — Z96.1 PSEUDOPHAKIA: ICD-10-CM

## 2020-08-18 DIAGNOSIS — Z01.00 EXAMINATION OF EYES AND VISION: Primary | ICD-10-CM

## 2020-08-18 DIAGNOSIS — H52.4 PRESBYOPIA: ICD-10-CM

## 2020-08-18 DIAGNOSIS — H43.813 PVD (POSTERIOR VITREOUS DETACHMENT), BILATERAL: ICD-10-CM

## 2020-08-18 DIAGNOSIS — H02.834 DERMATOCHALASIS OF BOTH UPPER EYELIDS: ICD-10-CM

## 2020-08-18 DIAGNOSIS — H52.13 MYOPIA OF BOTH EYES: ICD-10-CM

## 2020-08-18 DIAGNOSIS — H52.223 REGULAR ASTIGMATISM OF BOTH EYES: ICD-10-CM

## 2020-08-18 DIAGNOSIS — H02.889 MGD (MEIBOMIAN GLAND DYSFUNCTION): ICD-10-CM

## 2020-08-18 DIAGNOSIS — H02.831 DERMATOCHALASIS OF BOTH UPPER EYELIDS: ICD-10-CM

## 2020-08-18 DIAGNOSIS — H11.122 CONJUNCTIVAL CONCRETIONS OF LEFT EYE: ICD-10-CM

## 2020-08-18 DIAGNOSIS — Z79.899 HIGH RISK MEDICATION USE: ICD-10-CM

## 2020-08-18 LAB
ALBUMIN SERPL-MCNC: 3.8 G/DL (ref 3.4–5)
ALP SERPL-CCNC: 45 U/L (ref 40–150)
ALT SERPL W P-5'-P-CCNC: 25 U/L (ref 0–50)
AST SERPL W P-5'-P-CCNC: 15 U/L (ref 0–45)
BASOPHILS # BLD AUTO: 0 10E9/L (ref 0–0.2)
BASOPHILS NFR BLD AUTO: 0.2 %
BILIRUB DIRECT SERPL-MCNC: 0.1 MG/DL (ref 0–0.2)
BILIRUB SERPL-MCNC: 0.3 MG/DL (ref 0.2–1.3)
CREAT SERPL-MCNC: 0.59 MG/DL (ref 0.52–1.04)
CRP SERPL-MCNC: <2.9 MG/L (ref 0–8)
DIFFERENTIAL METHOD BLD: ABNORMAL
EOSINOPHIL # BLD AUTO: 0.4 10E9/L (ref 0–0.7)
EOSINOPHIL NFR BLD AUTO: 6 %
ERYTHROCYTE [DISTWIDTH] IN BLOOD BY AUTOMATED COUNT: 16 % (ref 10–15)
ERYTHROCYTE [SEDIMENTATION RATE] IN BLOOD BY WESTERGREN METHOD: 29 MM/H (ref 0–30)
GFR SERPL CREATININE-BSD FRML MDRD: 88 ML/MIN/{1.73_M2}
HCT VFR BLD AUTO: 35.6 % (ref 35–47)
HGB BLD-MCNC: 10.9 G/DL (ref 11.7–15.7)
LYMPHOCYTES # BLD AUTO: 1.1 10E9/L (ref 0.8–5.3)
LYMPHOCYTES NFR BLD AUTO: 17 %
MCH RBC QN AUTO: 29.2 PG (ref 26.5–33)
MCHC RBC AUTO-ENTMCNC: 30.6 G/DL (ref 31.5–36.5)
MCV RBC AUTO: 95 FL (ref 78–100)
MONOCYTES # BLD AUTO: 0.9 10E9/L (ref 0–1.3)
MONOCYTES NFR BLD AUTO: 13.5 %
NEUTROPHILS # BLD AUTO: 4 10E9/L (ref 1.6–8.3)
NEUTROPHILS NFR BLD AUTO: 63.3 %
PLATELET # BLD AUTO: 363 10E9/L (ref 150–450)
PROT SERPL-MCNC: 7.5 G/DL (ref 6.8–8.8)
RBC # BLD AUTO: 3.73 10E12/L (ref 3.8–5.2)
WBC # BLD AUTO: 6.3 10E9/L (ref 4–11)

## 2020-08-18 PROCEDURE — 92015 DETERMINE REFRACTIVE STATE: CPT | Performed by: STUDENT IN AN ORGANIZED HEALTH CARE EDUCATION/TRAINING PROGRAM

## 2020-08-18 PROCEDURE — 36415 COLL VENOUS BLD VENIPUNCTURE: CPT | Performed by: INTERNAL MEDICINE

## 2020-08-18 PROCEDURE — 85652 RBC SED RATE AUTOMATED: CPT | Performed by: INTERNAL MEDICINE

## 2020-08-18 PROCEDURE — 80076 HEPATIC FUNCTION PANEL: CPT | Performed by: INTERNAL MEDICINE

## 2020-08-18 PROCEDURE — 85025 COMPLETE CBC W/AUTO DIFF WBC: CPT | Performed by: INTERNAL MEDICINE

## 2020-08-18 PROCEDURE — 82565 ASSAY OF CREATININE: CPT | Performed by: INTERNAL MEDICINE

## 2020-08-18 PROCEDURE — 86140 C-REACTIVE PROTEIN: CPT | Performed by: INTERNAL MEDICINE

## 2020-08-18 PROCEDURE — 92014 COMPRE OPH EXAM EST PT 1/>: CPT | Performed by: STUDENT IN AN ORGANIZED HEALTH CARE EDUCATION/TRAINING PROGRAM

## 2020-08-18 ASSESSMENT — EXTERNAL EXAM - RIGHT EYE: OD_EXAM: NORMAL

## 2020-08-18 ASSESSMENT — REFRACTION_WEARINGRX
OD_CYLINDER: +1.00
OS_ADD: +3.00
OS_SPHERE: PLANO
OD_ADD: +3.00
OS_CYLINDER: SPHERE
SPECS_TYPE: PAL
OD_SPHERE: -0.50
OD_AXIS: 177

## 2020-08-18 ASSESSMENT — VISUAL ACUITY
METHOD: SNELLEN - LINEAR
OS_CC: J8
OS_CC: 20/40
CORRECTION_TYPE: GLASSES
OD_CC: J10
OS_PH_CC: 20/30
OD_CC+: +2
OD_CC: 20/25

## 2020-08-18 ASSESSMENT — TONOMETRY
OD_IOP_MMHG: 14
IOP_METHOD: APPLANATION
OS_IOP_MMHG: 15

## 2020-08-18 ASSESSMENT — SLIT LAMP EXAM - LIDS
COMMENTS: 1+ MEIBOMIAN GLAND DYSFUNCTION, 1+ DERMATOCHALASIS
COMMENTS: 1+ MEIBOMIAN GLAND DYSFUNCTION, 2+ DERMATOCHALASIS

## 2020-08-18 ASSESSMENT — REFRACTION_MANIFEST
OS_ADD: +3.00
OD_AXIS: 174
OS_SPHERE: -0.75
OD_SPHERE: -0.75
OD_CYLINDER: +1.00
OD_ADD: +3.00
OS_CYLINDER: +1.00
OS_AXIS: 012

## 2020-08-18 ASSESSMENT — EXTERNAL EXAM - LEFT EYE: OS_EXAM: NORMAL

## 2020-08-18 ASSESSMENT — CUP TO DISC RATIO
OS_RATIO: 0.35
OD_RATIO: 0.4

## 2020-08-18 ASSESSMENT — CONF VISUAL FIELD
OD_NORMAL: 1
OS_NORMAL: 1

## 2020-08-18 NOTE — PROGRESS NOTES
" Current Eye Medications:  Preservative free Artificial tears both eyes 4-5 times a day.      Subjective:  Comprehensive Eye Exam.  She has noticed there is \"extra skin hanging down\" on her left upper eyelid, but not on her right upper eyelid.  She has a foreign body sensation in her left eye most of the time which has been a long term problem.  Vision remains blurry in each eye.       Objective:  See Ophthalmology Exam.       Assessment:  Ginger Marshall is a 80 year old female who presents with:   Encounter Diagnoses   Name Primary?     Examination of eyes and vision      Myopia of both eyes      Regular astigmatism of both eyes      Presbyopia        PXF (PSEUDOEXFOLIATION OF LENS CAPSULE) OD Normotensive with stable discs     PSEUDOPHAKIA OU      Cornea guttata, ou      PVD (posterior vitreous detachment), bilateral      Rheumatoid arthritis involving multiple sites with positive rheumatoid factor (H)      Conjunctival concretions of left eye Debrided a few conjunctival concretions with 27g needle under proparacaine anesthestic at slit lamp today.      MGD (meibomian gland dysfunction)      Dermatochalasis of both upper eyelids        Plan:  Continue preservative-free artificial tears up to four times a day as needed     Offered referral to oculoplastics if you wish to have the droopy eyelids addressed    Glasses prescription given     Aguila San MD  [phone]      "

## 2020-08-18 NOTE — LETTER
"    8/18/2020         RE: Ginger Marshall  [address]  [place] [address]        Dear Colleague,    Thank you for referring your patient, Ginger Marshall, to the [facility]. Please see a copy of my visit note below.     Current Eye Medications:  Preservative free Artificial tears both eyes 4-5 times a day.      Subjective:  Comprehensive Eye Exam.  She has noticed there is \"extra skin hanging down\" on her left upper eyelid, but not on her right upper eyelid.  She has a foreign body sensation in her left eye most of the time which has been a long term problem.  Vision remains blurry in each eye.       Objective:  See Ophthalmology Exam.       Assessment:  Ginger Marshall is a 80 year old female who presents with:   Encounter Diagnoses   Name Primary?     Examination of eyes and vision      Myopia of both eyes      Regular astigmatism of both eyes      Presbyopia        PXF (PSEUDOEXFOLIATION OF LENS CAPSULE) OD Normotensive with stable discs     PSEUDOPHAKIA OU      Cornea guttata, ou      PVD (posterior vitreous detachment), bilateral      Rheumatoid arthritis involving multiple sites with positive rheumatoid factor (H)      Conjunctival concretions of left eye Debrided a few conjunctival concretions with 27g needle under proparacaine anesthestic at slit lamp today.      MGD (meibomian gland dysfunction)      Dermatochalasis of both upper eyelids        Plan:  Continue preservative-free artificial tears up to four times a day as needed     Offered referral to oculoplastics if you wish to have the droopy eyelids addressed    Glasses prescription given     Aguila San MD  [phone]        Again, thank you for allowing me to participate in the care of your patient.        Sincerely,        Aguila San MD    "

## 2020-08-18 NOTE — PATIENT INSTRUCTIONS
Continue preservative-free artificial tears up to four times a day as needed     Offered referral to oculoplastics if you wish to have the droopy eyelids addressed    Glasses prescription given     Aguila San MD  [phone]

## 2020-08-21 ENCOUNTER — MYC MEDICAL ADVICE (OUTPATIENT)
Dept: RHEUMATOLOGY | Facility: CLINIC | Age: 80
End: 2020-08-21

## 2020-08-21 DIAGNOSIS — M05.79 RHEUMATOID ARTHRITIS INVOLVING MULTIPLE SITES WITH POSITIVE RHEUMATOID FACTOR (H): Primary | ICD-10-CM

## 2020-08-21 DIAGNOSIS — Z79.899 HIGH RISK MEDICATION USE: ICD-10-CM

## 2020-08-27 NOTE — TELEPHONE ENCOUNTER
RN: please notify Ginger Marshall that she will need labs again in mid-Nov. For her anemia, I recommend that she discuss with her primary care provider if she prefers to cancel the September rheumatology appointment.  She should follow-up in rheumatology at least every 6 months and it is possible that even a follow-up rheumatology appointment in December will be virtual.      Nico Byers MD  8/27/2020 2:22 PM

## 2020-08-28 NOTE — TELEPHONE ENCOUNTER
Called patient and discussed the message below. Patient verbalized understanding and said she will get labs done and try to schedule an apt with her PCP.    Diomedes Yates RN....8/28/2020 9:53 AM

## 2020-09-10 NOTE — TELEPHONE ENCOUNTER
"RN Specialty manager spoke to Ginger over the phone about her concerns in not being able to see Dr. Byers face to face.   The following was relayed to patient per Dr. Byers: \"patient would be appropriate for a virtual visit. A virtual visit is used to reduce risk to the patient and staff with regard to COVID19.\"     Patient verbalized frustration with not being able to be seen in person with her Rheumatologist (\"I'm in pain and I'm not sure what he would do over the phone\".) She has been able to see other providers in clinic and has been coming to this location for \"many, many years.\" RN manager apologized that an in-person visit cannot be accommodated at this time and relayed that Dr. Byers would be notified of patient's concerns. RN also stated that Dr. Byers would be able to advise if/ when an in-person visit would be appropriate.     RN inquired about the visit that patient is scheduled for tomorrow and whether or not she would like to keep this appointment to discuss a plan of care with Dr. Byers. Patient verbalized that she was in agreement and would like a telephone visit set up. The best (and only) number that Ginger can be reached is [phone] (H). No further action is required of clinic management at this time as patient has verbalized agreement with the above.     Samantha, please set up a telephone visit for Ginger.  Thank you     Mandy Boss RN   [facility] Specialty Manager       "

## 2020-09-11 ENCOUNTER — VIRTUAL VISIT (OUTPATIENT)
Dept: RHEUMATOLOGY | Facility: CLINIC | Age: 80
End: 2020-09-11
Payer: COMMERCIAL

## 2020-09-11 DIAGNOSIS — M05.79 RHEUMATOID ARTHRITIS INVOLVING MULTIPLE SITES WITH POSITIVE RHEUMATOID FACTOR (H): Primary | ICD-10-CM

## 2020-09-11 DIAGNOSIS — M54.50 CHRONIC BILATERAL LOW BACK PAIN, UNSPECIFIED WHETHER SCIATICA PRESENT: ICD-10-CM

## 2020-09-11 DIAGNOSIS — Z79.899 HIGH RISK MEDICATION USE: ICD-10-CM

## 2020-09-11 DIAGNOSIS — M25.511 CHRONIC RIGHT SHOULDER PAIN: ICD-10-CM

## 2020-09-11 DIAGNOSIS — G89.29 CHRONIC RIGHT SHOULDER PAIN: ICD-10-CM

## 2020-09-11 DIAGNOSIS — G89.29 CHRONIC BILATERAL LOW BACK PAIN, UNSPECIFIED WHETHER SCIATICA PRESENT: ICD-10-CM

## 2020-09-11 DIAGNOSIS — M81.0 OSTEOPOROSIS WITHOUT CURRENT PATHOLOGICAL FRACTURE, UNSPECIFIED OSTEOPOROSIS TYPE: ICD-10-CM

## 2020-09-11 PROCEDURE — 99214 OFFICE O/P EST MOD 30 MIN: CPT | Mod: 95 | Performed by: INTERNAL MEDICINE

## 2020-09-11 RX ORDER — METHOTREXATE 25 MG/.5ML
25 INJECTION, SOLUTION SUBCUTANEOUS
Qty: 2 ML | Refills: 4 | Status: SHIPPED | OUTPATIENT
Start: 2020-09-11 | End: 2020-12-18

## 2020-09-11 RX ORDER — FOLIC ACID 0.8 MG
1600 TABLET ORAL DAILY
COMMUNITY
Start: 2020-09-11 | End: 2021-06-18

## 2020-09-11 NOTE — PATIENT INSTRUCTIONS
Please call to schedule a lab appointment to be in early-to-mid November    Please call to schedule an x-rays of you right shoulder    Please call to schedule physical therapy for your lower back and right shoulder    If the low back pain does not improve, please call to make an appointment with your surgeon: Dr. Anthony Michele    Continue methotrexate 25 mg subcutaneous once every 7 days    Continue Xeljanz XR 11 mg daily    Increase folic acid to 1600 mcg daily

## 2020-09-11 NOTE — PROGRESS NOTES
"Ginger Marshall is a 80 year old female who is being evaluated via a billable video visit.      The patient has been notified of following:     \"This video visit will be conducted via a call between you and your physician/provider. We have found that certain health care needs can be provided without the need for an in-person physical exam.  This service lets us provide the care you need with a video conversation.  If a prescription is necessary we can send it directly to your pharmacy.  If lab work is needed we can place an order for that and you can then stop by our lab to have the test done at a later time.    Video visits are billed at different rates depending on your insurance coverage.  Please reach out to your insurance provider with any questions.    If during the course of the call the physician/provider feels a video visit is not appropriate, you will not be charged for this service.\"    Patient has given verbal consent for Video visit? Yes  How would you like to obtain your AVS? MyChart  If you are dropped from the video visit, the video invite should be resent to: cell phone: [phone]  Will anyone else be joining your video visit? No    Rheumatology Video Visit      Ginger Marshall MRN# [id number]   YOB: 1940 Age: 80 year old      Date of visit: 9/11/20   PCP: Georgia Vázquez MD     Chief Complaint   Patient presents with:  Arthritis: lower back is bad, having issues with right arm (aches)    Assessment and Plan     1. Seropositive ( [2009], CCP >100 [2009]; hx of rheumatoid nodule by 6/14/2011 pathology) Erosive Rheumatoid Arthritis: Previously failed remicade (staph infection during therapy, but was immediately after a joint injection), orencia (migraines), HCQ (ineffective).  Sulfa allergy.  Currently on methotrexate 25 mg SQ once weekly (dose reduction in the past resulted in more symptoms), folic acid 800 mcg daily, Xeljanz XR 11mg daily.  She also has prednisone 20 mg daily ×5 " "days to use as needed for flare.  RA appears well controlled at this time.  Chronic nasal sores starting 4 years ago that was thought to be secondary to allergies; possibly methotrexate related and therefore asked that she double dose of folic acid.   - Continue methotrexate from 25mg SQ once weekly  - Increase folic acid from 800mcg daily to 1600mcg daily  - Continue Xeljanz XR 11mg daily  - Labs every 12 weeks: CBC, Creatinine, Hepatic Panel, ESR, CRP; I advised that she call to schedule the lab appointment              Rapid 3, cumulative scores                       9/11/2020: \"RA is doing well\" (MTX 25mg SQ wkly, Xeljanz XR 11mg daily)                      10/31/2019: 3.8 (MTX 25mg PO wkly, Xeljanz XR 11mg daily)                        07/11/2019: 8.2 (MTX 25mg PO wkly, HCQ 400mg daily, Xeljanz XR 11mg daily)                        03/06/2019: ?    (MTX 25mg PO wkly, HCQ 400mg daily, Xeljanz XR 11mg daily)                       12/12/2018: 7.5 (MTX 25mg wkly, HCQ 400mg daily, Xeljanz XR 11mg daily)                       08/08/2018: 7    (MTX 22.5mg wkly, Xeljanz XR 11mg daily)                       02/07/2018: 4    (MTX 22.5mg wkly, Xeljanz XR 11mg daily)                       11/08/2017: 5    (MTX 22.5mg wkly, Xeljanz XR 11mg daily)                       08/09/2017: 0    (MTX 22.5mg wkly, Xeljanz XR 11mg daily)                      05/10/2017: 5    (MTX 25mg wkly, Xeljanz XR 11mg daily) RA doing well    2. Right hip pain: Status post WALTER twice in the past. Followed by [facility].     3. 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 Michele who is now at [facility].  Advised that she restart physical therapy as physical therapy was helpful until it was stopped due to COVID-19 pandemic; she denies doing exercises at home.  Advised that she go to physical therapy again, and recommended that she establish a home exercise regimen so that she " can do most of the physical therapy exercises at home to reduce risk for COVID-19 exposure.  - Physical therapy referral    4. Osteoporosis: was previously managed by endocrinology and she received reclast once in 2016. 12/12/2018 DEXA ordered by Dr. Vázquez showed osteoporosis.  Again discussed the treatment options for osteoporosis, risks associated with the treatment, and the risk associated with not treating.  I recommended that she treat her osteoporosis but she has refused.  She verbalized understanding about the risks of not treating osteoporosis, and commented that Dr. Vázquez has told her the same thing    5. Iron Deficiency Anemia: Managed by PCP.  I pointed out that her hemoglobin has reduced some; she has not noticed any blood in her stool said that she will follow-up with her PCP regarding this issue    6. Left 1st toe pain, history: 2 episodes of sudden onset left toe pain followed by diffuse left foot pain that made ambulation difficult.  Also with nodules over both Achilles tendons and the right elbow that are either rheumatoid nodules or tophi.  She reports having history of gout decades ago.  Denies ever being on colchicine or allopurinol.  Discussed colchicine to use if suspected gout flare occurs.  No flares since last seen so has not used colchicine.  - Colchicine: (MITIGARE) 0.6 MG capsule; Take 2 capsules (1.2 mg) by mouth once for 1 dose at the onset of a gout flare, followed by 1 capsule (0.6mg) 1 hour later.  Dispense: 3 capsule; Refill: 0    7.  Right shoulder pain: Consistent with impingement syndrome of the right shoulder; I reviewed this diagnosis and the treatment plan.  Refer to physical therapy.  Because the right shoulder pain started after a fall in April 2020 when she also hurt her right wrist, it is reasonable to get an x-ray of the right shoulder.  Note that she is not have any joint pain distal to the right shoulder.  - X-ray right shoulder; advised that she call schedule the x-ray  "appointment  - Physical therapy referral; advised that she call to schedule    8. Patient complaint about virtual visits: Ginger Marshall complained about this being a virtual visit, prior to the visit.  I explained the rationale for virtual visits to reduce the risk for COVID-19 exposure.  I explained that she is considered to be at high risk of complications from the COVID-19 virus and therefore recommended to limit contact with other people if possible.  Her rheumatology care is able to be managed by a virtual visit today.  I specifically asked her if she felt like this virtual visit was sufficient to address all of her rheumatology concerns and she said \"yes, this is good.  I'm old and just didn't want to change\"; she thanked me for the appointment and asked to schedule the next visit and it was scheduled.  She verbalized understanding and agreement that the next visit will likely be virtual, primarily dependent on the status of the COVID19 pandemic, and she said that another virtual appointment is okay.    # Relevant labs and imaging were reviewed with the patient    # High risk medication toxicity monitoring: discussion and labs reviewed; appropriate labs ordered. See above.  Instructed that if confirmed to have COVID-19 or exposure to someone with confirmed COVID-19 to call this clinic for directions on DMARD management.    # Note that this is a virtual visit to reduce the risk of COVID-19 exposure during this current pandemic.      # Considered to be at high risk of complications from the COVID-19 virus.  It is recommended to limit contact with other people and if possible to work remotely or provide a leave of absence to reduce the risk for COVID-19.      Ms. Marshall verbalized agreement with and understanding of the rational for the diagnosis and treatment plan.  All questions were answered to best of my ability and the patient's satisfaction. Ms. Marshall was advised to contact the clinic with any questions that " may arise after the clinic visit.      Thank you for involving me in the care of the patient    Return to clinic: 3-4 months    HPI   Ginger Marshall is a 80 year old female with a history of multiple foot surgeries, hand tendon transfers, MCP replacements (most recent being in August 2015), bilateral TKA, right WALTER, left distal radius fracture history, gout, s/p lumbar fusion, osteoporosis and seropositive (RF+, CCP+) erosive rheumatoid arthritis who presents for follow-up of rheumatoid arthritis.      Today, 9/11/2020: Ms. Marshall reports lower back pain and right arm aches.  She had a fall onto a cement floor at home in April 2020, with pain radiating deep in the hand along the index and long fingers for the wrist and forearm, but no significant pain with finger motion.  Most of the carpal bones are fused on the right and she has had replacement of her index and long MCP joints; history of distal ulna resection and revision of the middle third MCP.  Her hand surgeon is Dr. Redmond.  X-rays of the right wrist on 4/27/2020 showed postoperative changes and degenerative arthritis.  She is the right hand and wrist pain has resolved.  Now she says that her right shoulder hurts, says when she reaches above her head, even worse when she reach out to the side of her head.  She is using Biofreeze and ice for her shoulder with some improvement.  She said that her right shoulder range of motion is intact.  She says her right shoulder pain started at the same time as her fall April 2020.  She also notes having lower back pain that is worse activity, and better with Biofreeze and ice.  Therapy was very helpful for her lower back but she stopped going because of the COVID-19 pandemic when everything shut down and she has not resumed physical therapy sessions; she is also not doing any of the physical therapy exercises that she was taught previously.  She has not followed up with her surgeon who operated on her lower back.  With  regard to rheumatoid arthritis, she feels that it is well controlled at the time and she is tolerating Xeljanz and methotrexate well.  Nasal sore has been chronic for 4 years and thought to be secondary to allergies; she says that he has seen ENT for this.      Denies fevers, chills, nausea, vomiting, constipation, diarrhea. No abdominal pain. No chest pain/pressure, palpitations, or shortness of breath. No oral sores.   . No rash. No LE swelling.     Tobacco: quit in 1999  EtOH: 1 glass of wine every month at most  Drugs: None  Occupation: , retired     ROS   GEN: No fevers, chills  SKIN: No rash  HEENT:  No oral ulcers. see HPI  CV: No chest pain, pressure, palpitations, or dyspnea on exertion.  PULM: No SOB, wheeze, cough.  GI:  No nausea, vomiting, constipation, diarrhea. No blood in stool. No abdominal pain.  : No blood in urine.  MSK: See HPI.  NEURO: No numbness or tingling  EXT: No LE swelling    Active Problem List     Patient Active Problem List   Diagnosis     Rheumatoid arthritis involving right hand with positive rheumatoid factor (H)     Menopause     History of colonic polyps     Mitral Regurgitation     Multinodular goiter     CARDIOVASCULAR SCREENING; LDL GOAL LESS THAN 130     Psychophysiologic insomnia     Pulmonary nodule     PSEUDOPHAKIA OU     PVD (POSTERIOR VITREOUS DETACHMENT) OU     PXF (PSEUDOEXFOLIATION OF LENS CAPSULE) OD     GERD (gastroesophageal reflux disease)     Hyperlipidemia LDL goal <100     Advance Care Planning     Neuropathy     SHERRY (obstructive sleep apnea)-severe (AHI 35)     zEncounter for counseling     Anemia of chronic disease     Migraine     Osteoporosis     Cornea guttata, ou     Conjunctival concretions     Stenosis, spinal, lumbar     Other chronic pain     Obesity     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     MGD (meibomian gland dysfunction)     Blepharitis of both eyes     Personal history of healed osteoporosis fracture      Iron malabsorption     Hyperglycemia     Chronic bilateral low back pain without sciatica     Allergic state, subsequent encounter     BMI 32.0-32.9,adult     Spinal stenosis of lumbar region without neurogenic claudication     Herniated nucleus pulposus, L3-4     S/P lumbar fusion     Anxiety     Low iron     BMI 28.0-28.9,adult     History of depression     DDD (degenerative disc disease), lumbar     Closed fracture of multiple ribs of left side, initial encounter     Past Medical History     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     History of total knee replacement 7/23/2009     Menopause late 40's     Other chronic pain     joints     Pelvic fracture (H) 5/13/2014     PUD (peptic ulcer disease)      Rheumatoid arteritis (H)      Sleep apnea     Uses a CPAP     Vitamin B12 deficiency      Past Surgical History     Past Surgical History:   Procedure Laterality Date     ABDOMEN SURGERY      c-sections     ARTHROSCOPY KNEE RT/LT  01/06    left     BACK SURGERY  2013    disc     BREAST BIOPSY, RT/LT      left benign     BREAST SURGERY  90's    lumpectomy     C ANESTH,TOTAL HIP ARTHROPLASTY  2010    Rt hip     C HAND/FINGER SURGERY UNLISTED  11/05    right hand     C NONSPECIFIC PROCEDURE  91    left foot surgery     C NONSPECIFIC PROCEDURE  95    R MCP surgery     C TOTAL KNEE ARTHROPLASTY  2006    left     C/SECTION, LOW TRANSVERSE  66,72    x 2     CATARACT IOL, RT/LT       COLONOSCOPY  2006,2009     EYE SURGERY      cataracs     HC ESOPH/GAS REFLUX TEST W NASAL IMPED >1 HR  2/1/2012    Procedure:ESOPHAGEAL IMPEDENCE FUNCTION TEST WITH 24 HOUR PH GREATER THAN 1 HOUR; Surgeon:KAYKAY JULIO; Location:[facility]     IR CAUDAL EPIDURAL INJECTION SINGLE  5/2/2012    LESI L5-S1 at [employer]     OPTICAL TRACKING SYSTEM FUSION POSTERIOR SPINE LUMBAR N/A 4/3/2017    Procedure:  "OPTICAL TRACKING SYSTEM FUSION SPINE POSTERIOR LUMBAR ONE LEVEL;  Surgeon: Anthony Michele MD;  Location: [facility]     SURGICAL HISTORY OF -   2007    right knee total replacement     Allergy     Allergies   Allergen Reactions     Abatacept Other (See Comments)     Severe headaches  Migraine     Celebrex [Celecoxib]      Ineffective     Celecoxib Unknown and Nausea     Ethanol      Antihistamines     Orencia [Abatacept]      Headache     Septra [Bactrim]      Sulfa Drugs Nausea and Vomiting     \"deathly ill\"  Allergic to everything with Sulfa in it.     Sulfamethoxazole-Trimethoprim Nausea and Nausea and Vomiting     Tramadol Other (See Comments)     Headache  Migraine headache     Valdecoxib Unknown and Nausea     Made me \"very very ill\", might of been \"cramping\"     Adhesive Tape Rash     Plastic tape  Plastic tapes     Antihistamines, Chlorpheniramine-Type  [Alkylamines] Anxiety and Other (See Comments)     Current Medication List     Current Outpatient Medications   Medication Sig     acetaminophen (TYLENOL) 500 MG tablet Take 2 tablets (1,000 mg) by mouth every 8 hours as needed for pain     atorvastatin (LIPITOR) 40 MG tablet TAKE ONE TABLET BY MOUTH EVERY DAY     Cholecalciferol (VITAMIN D-3 PO) Take 1,000 Units by mouth 2 times daily     Coenzyme Q10 (CO Q 10 PO)      desvenlafaxine (PRISTIQ) 100 MG 24 hr tablet Take 1 tablet (100 mg) by mouth daily     FOLIC ACID PO Take 800 mcg by mouth daily     ibuprofen (ADVIL) 200 MG tablet Take 200 mg by mouth every 4 hours as needed for mild pain     Methotrexate, PF, (RASUVO) 25 MG/0.5ML autoinjector Inject 0.5 mLs (25 mg) Subcutaneous every 7 days . Hold for signs of infection, and seek medical attention.     multivitamin, therapeutic with minerals (MULTI-VITAMIN) TABS Take 1 tablet by mouth daily     naproxen sodium 220 MG capsule Take 220 mg by mouth 2 times daily (with meals)     oxyCODONE (ROXICODONE) 5 MG tablet Take 1 tablet (5 mg) by mouth every 6 " "hours as needed for moderate to severe pain     pantoprazole (PROTONIX) 40 MG EC tablet TAKE ONE TABLET BY MOUTH EVERY DAY 30-60 MINUTES BEFORE A MEAL     sucralfate (CARAFATE) 1 GM tablet TAKE ONE TABLET BY MOUTH FOUR TIMES A DAY AS NEEDED FOR HEARTBURN     tofacitinib (XELJANZ XR) 11 MG 24 hr tablet Take 1 tablet (11 mg) by mouth daily     topiramate (TOPAMAX) 50 MG tablet Take 1 tablet (50 mg) by mouth 2 times daily     triamcinolone (KENALOG) 0.1 % external cream Apply topically 2 times daily as needed for irritation     valACYclovir (VALTREX) 500 MG tablet TAKE ONE TABLET BY MOUTH ONCE DAILY     order for DME Equipment being ordered: Up walker     No current facility-administered medications for this visit.      Social History   See HPI    Family History     Family History   Problem Relation Age of Onset     Arthritis Mother      Alzheimer Disease Mother      Hyperlipidemia Mother      Osteoporosis Mother      Heart Disease Father         MI ( from this)     Alcohol/Drug Father      Arthritis Sister      Hypertension Sister      Cancer Son      Diabetes Son      Neurologic Disorder Sister         Schizophrenic     Hypertension Sister      Hyperlipidemia Sister      Mental Illness Sister      Diabetes Son      Other Cancer Son      Glaucoma Daughter      No change in family history since the previous clinic visit.    Physical Exam     Temp Readings from Last 3 Encounters:   20 97.8  F (36.6  C) (Oral)   20 98.1  F (36.7  C) (Oral)   20 98  F (36.7  C) (Oral)     BP Readings from Last 5 Encounters:   20 128/74   20 132/58   20 130/85   20 124/68   20 128/76     Pulse Readings from Last 1 Encounters:   20 107     Resp Readings from Last 1 Encounters:   20 16     Estimated body mass index is 30.51 kg/m  as calculated from the following:    Height as of 20: 1.702 m (5' 7\").    Weight as of 20: 88.4 kg (194 lb 12.8 oz).      GEN: NAD. Healthy " appearing adult.   HEENT: MMM.  Anicteric, noninjected sclera. No obvious external lesions of the ear and nose. Hearing intact.  PULM: No increased work of breathing  MSK:  Hands and wrists without swelling.  Elbows no swelling.  Right shoulder with normal range of motion; mildly painful with abduction above 90 degrees.  Left shoulder with normal range of motion.     SKIN: No rash or jaundice seen  PSYCH: Alert. Appropriate.        Labs       CBC  Recent Labs   Lab Test 08/18/20  0950 05/22/20  1026 03/27/20  1413   WBC 6.3 7.0 5.0   RBC 3.73* 3.76* 3.68*   HGB 10.9* 11.5* 11.2*   HCT 35.6 36.3 34.7*   MCV 95 97 94   RDW 16.0* 15.1* 14.7    347 356   MCH 29.2 30.6 30.4   MCHC 30.6* 31.7 32.3   NEUTROPHIL 63.3 67.5 44.7   LYMPH 17.0 15.2 31.7   MONOCYTE 13.5 12.6 17.0   EOSINOPHIL 6.0 4.6 6.4   BASOPHIL 0.2 0.1 0.2   ANEU 4.0 4.7 2.2   ALYM 1.1 1.1 1.6   MARYURI 0.9 0.9 0.9   AEOS 0.4 0.3 0.3   ABAS 0.0 0.0 0.0     CMP  Recent Labs   Lab Test 08/18/20  0950 05/22/20  1026 03/27/20  1413 02/27/20  1117 12/19/19  0957  08/15/19  1118   NA  --   --  140  --  142  --  144   POTASSIUM  --   --  3.4  --  3.8  --  3.5   CHLORIDE  --   --  112*  --  111*  --  112*   CO2  --   --  24  --  24  --  25   ANIONGAP  --   --  4  --  7  --  7   GLC  --   --  98  --  88  --  95   BUN  --   --  18  --  10  --  16   CR 0.59 0.64 0.57 0.66 0.68   < > 0.57   GFRESTIMATED 88 86 89 85 84   < > 89   GFRESTBLACK >90 >90 >90 >90 >90   < > >90   BRANDEE  --   --  9.1  --  9.4  --  9.6   BILITOTAL 0.3 0.4  --  0.3 0.3   < >  --    ALBUMIN 3.8 4.1  --  3.6 4.0   < >  --    PROTTOTAL 7.5 7.7  --  7.5 7.6   < >  --    ALKPHOS 45 49  --  47 45   < >  --    AST 15 19  --  18 19   < >  --    ALT 25 23  --  19 22   < >  --     < > = values in this interval not displayed.     Calcium/VitaminD  Recent Labs   Lab Test 03/27/20  1413 12/19/19  0957 08/15/19  1118 03/06/19  1351  10/28/16  1239  02/05/13  1050   BRANDEE 9.1 9.4 9.6 9.3   < > 9.4   < > 9.3  "  VITDT  --   --   --  41  --  37  --  33    < > = values in this interval not displayed.     ESR/CRP  Recent Labs   Lab Test 08/18/20  0950 05/22/20  1026 02/27/20  1117   SED 29 16 37*   CRP <2.9 <2.9 <2.9     Lipid Panel  Recent Labs   Lab Test 12/19/19  0957 11/23/18  1309 12/14/17  0957 10/28/16  1238  01/13/14  1109 07/24/13  1024   CHOL 166  --  164 176   < > 135 177   TRIG 113  --  121 103   < > 89 80   HDL 89  --  79 77   < > 49* 89   LDL 54 72 61 78   < > 68 72   VLDL  --   --   --   --   --  18 16   CHOLHDLRATIO  --   --   --   --   --  2.8 2.0   NHDL 77  --  85 99   < >  --   --     < > = values in this interval not displayed.     Hepatitis B  Recent Labs   Lab Test 09/15/15  1159   AUSAB 0.11   HBCAB Nonreactive   HEPBANG Nonreactive     Hepatitis C  Recent Labs   Lab Test 09/15/15  1159   HCVAB Nonreactive   Assay performance characteristics have not been established for newborns,   infants, and children       Tuberculosis Screening  Recent Labs   Lab Test 05/07/18  1033 09/15/15  1200   TBRSLT Negative Negative   TBAGN 0.00 0.00     \"HAND BILATERAL THREE OR MORE VIEWS 9/15/2015 12:28 PM   HISTORY: Rheumatoid arthritis; establish baseline. Rheumatoid  arthritis(714.0)  COMPARISON: None  IMPRESSION  IMPRESSION: There is diffuse osteopenia. Postoperative changes of the  right second and third metacarpophalangeal joints. Moderate joint  space narrowing involving the left second and third  metacarpophalangeal joints. Mild joint space narrowing of the right  fourth and fifth metacarpophalangeal joints. There are also small  periarticular erosive changes of the metacarpophalangeal joints. There  is fusion of several of the right carpal bones. Marked joint space  loss in the left wrist. Findings are consistent with the clinical  history of rheumatoid arthritis. Chronic fracture deformities of the  right distal radius and ulna. No acute fracture is seen.  SPENCER MONSALVE MD\"    Immunization History "     Immunization History   Administered Date(s) Administered     FLU 6-35 months 10/24/2006, 11/14/2007, 10/22/2008, 10/21/2009     Flu, Unspecified 10/20/2013     Influenza (High Dose) 3 valent vaccine 10/28/2013, 09/23/2014, 11/11/2015, 09/23/2016, 09/24/2019     Influenza (IIV3) PF 10/12/1999, 10/26/2001, 10/19/2002, 10/30/2003, 10/28/2004, 10/21/2005, 10/26/2007, 10/21/2009, 10/05/2011, 10/13/2012     Influenza Vaccine Im 4yrs+ 4 Valent CCIIV4 09/28/2017, 09/27/2018     Mantoux Tuberculin Skin Test 11/03/2006     Pneumo Conj 13-V (2010&after) 07/29/2015     Pneumococcal 23 valent 06/26/2000, 10/26/2001, 06/01/2007, 06/02/2010     TD (ADULT, 7+) 12/10/2008, 07/20/2009     Tdap (Adult) Unspecified Formulation 12/12/2018     Zoster vaccine recombinant adjuvanted (SHINGRIX) 12/12/2018, 02/14/2019          Chart documentation done in part with Dragon Voice recognition Software. Although reviewed after completion, some word and grammatical error may remain.    Video-Visit Details    Type of service:  Video Visit    Video Start Time: 10:20 AM  Video End Time: 10:43 AM    Originating Location (pt. Location): Home, in [place]    Distant Location (provider location):  Home    Platform used for Video Visit: Roxanne Byers MD

## 2020-09-14 ENCOUNTER — ANCILLARY PROCEDURE (OUTPATIENT)
Dept: GENERAL RADIOLOGY | Facility: CLINIC | Age: 80
End: 2020-09-14
Attending: INTERNAL MEDICINE
Payer: COMMERCIAL

## 2020-09-14 DIAGNOSIS — G89.29 CHRONIC RIGHT SHOULDER PAIN: ICD-10-CM

## 2020-09-14 DIAGNOSIS — M25.511 CHRONIC RIGHT SHOULDER PAIN: ICD-10-CM

## 2020-09-14 PROCEDURE — 73030 X-RAY EXAM OF SHOULDER: CPT | Mod: RT

## 2020-09-16 ENCOUNTER — THERAPY VISIT (OUTPATIENT)
Dept: PHYSICAL THERAPY | Facility: CLINIC | Age: 80
End: 2020-09-16
Attending: INTERNAL MEDICINE
Payer: COMMERCIAL

## 2020-09-16 DIAGNOSIS — M25.511 CHRONIC RIGHT SHOULDER PAIN: ICD-10-CM

## 2020-09-16 DIAGNOSIS — M54.50 CHRONIC BILATERAL LOW BACK PAIN, UNSPECIFIED WHETHER SCIATICA PRESENT: ICD-10-CM

## 2020-09-16 DIAGNOSIS — G89.29 CHRONIC RIGHT SHOULDER PAIN: ICD-10-CM

## 2020-09-16 DIAGNOSIS — G89.29 CHRONIC BILATERAL LOW BACK PAIN, UNSPECIFIED WHETHER SCIATICA PRESENT: ICD-10-CM

## 2020-09-16 PROCEDURE — 97110 THERAPEUTIC EXERCISES: CPT | Mod: GP | Performed by: PHYSICAL THERAPIST

## 2020-09-16 PROCEDURE — 97161 PT EVAL LOW COMPLEX 20 MIN: CPT | Mod: GP | Performed by: PHYSICAL THERAPIST

## 2020-09-16 NOTE — PROGRESS NOTES
[facility] Initial Evaluation  Subjective:  The history is provided by the patient.   Patient Health History  Ginger Marshall being seen for Right shoulder and back.     Date of Onset: April initially, had another incident in August; MD referral 9/11/2020.   Problem occurred: fall in April   Pain is reported as 4/10 on pain scale.  General health as reported by patient is good.  Pertinent medical history includes: anemia, depression, implanted device, migraines/headaches, rheumatoid arthritis, overweight, osteoporosis and sleep disorder/apnea.      Other medical allergies details: sulfa, certain arthritis meds.   Surgeries include:  Orthopedic surgery. Other surgery history details: feet, knees, hip, back.    Current medications:  Anti-depressants.    Current occupation is retired.      Other job/home tasks details: general housework.                Therapist Generated HPI Evaluation         Type of problem:  Right shoulder (states the arm gets achy from upper arm to lower arm).          Patient reports pain:  Lateral.  Pain is described as aching (throughout arm) and is intermittent.      Symptoms are exacerbated by using arm at shoulder level, using arm overhead and lifting (driving, dishes, laundry)  and relieved by rest (will then go away until she uses her arm later).  Special tests included:  X-ray (Mild-to-moderate acromioclavicular degenerative changes.).        Therapist Generated HPI Evaluation  Problem details: History of spinal fusion a few years ago. Feels her upper back has been painful since that surgery.     .         Type of problem:  Lumbar.    This is a chronic condition.  Occurance: had another recent fall onto her coccyx, landing on hard surface while gardening.    Patient reports pain:  SI joint left, SI joint right and central lumbar spine (coccyx).        Symptoms are exacerbated by standing, walking, sitting and certain positions (transfers in/out of bed; sitting is  painful with coccyx pressure)  and relieved by rest (lying down, biofreeze also helps).                              Objective:    Gait:  Slow gait speed, no assistive device but patient uses 4WW at home. Favors R stance time vs L. Short L stance time and R step length                   Lumbar/SI Evaluation  ROM:    AROM Lumbar:   Flexion:          To mid shin, very painful coming back up  Ext:                    Min loss, painful with repeated measures   Side Bend:        Left:  Mod restriction    Right:  Mod restriction  Rotation:           Left:     Right:   Side Glide:        Left:     Right:         Strength: 3+/5 hip abd - did not test hip ext          Neural Tension/Mobility:      Left side:SLR or SLR w/DF  negative.     Right side:   SLR w/DF or SLR  negative.   Lumbar Palpation:  Palpation (lumbar): coccyx.    Tenderness not present at Left:    Erector Spinae; Piriformis or PSIS    Tenderness not present at Right:  Erector Spinae; Piriformis or PSIS  Functional Tests:  Core strength and proprioception lumbar: very slow with functional transfers; mod pain with sit to stand, 6/10 with sit to/from supine.                      Shoulder Evaluation:  ROM:  AROM:    Flexion:  Left:  120    Right:  110    Abduction:  Left: 120   Right:  90    Internal Rotation:  Left:  T4    Right:  T7  External Rotation:  Left:  T1    Right:  T1                      Strength:    Flexion: Left:5/5   Pain:    Right: 4+/5     Pain:     Abduction:  Left: 5/5  Pain:    Right: 4+/5     Pain:    Internal Rotation:  Left:5/5     Pain:    Right: 5/5     Pain:  External Rotation:   Left:5/5     Pain:   Right:4+/5      Pain:++              Special Tests:      Right shoulder positive for the following special tests:Impingement  Palpation:      Right shoulder tenderness present at: Supraspinatus                                         General Evaluation:                        Posture:    Standing:   Rounded shoulders  Sitting:  Rounded  shoulders, thoracic kyphosis increased and lordosis decreased                                           ROS    Assessment/Plan:    Patient is a 80 year old female with lumbar and right side shoulder complaints.    Patient has the following significant findings with corresponding treatment plan.                Diagnosis 1:  Chronic LBP  Pain -  manual therapy  Decreased ROM/flexibility - manual therapy, therapeutic exercise and therapeutic activity  Decreased strength - therapeutic exercise and therapeutic activities  Decreased function - therapeutic activities  Impaired posture - neuro re-education    Therapy Evaluation Codes:     Cumulative Therapy Evaluation is: Low complexity.    Previous and current functional limitations:  (See Goal Flow Sheet for this information)    Short term and Long term goals: (See Goal Flow Sheet for this information)     Communication ability:  Patient appears to be able to clearly communicate and understand verbal and written communication and follow directions correctly.  Treatment Explanation - The following has been discussed with the patient:   RX ordered/plan of care  Anticipated outcomes  Possible risks and side effects  This patient would benefit from PT intervention to resume normal activities.   Rehab potential is good.    Frequency:  1 X week, once daily  Duration:  for 4 weeks tapering to 2 X a month over 4 weeks  Discharge Plan:  Achieve all LTG.  Independent in home treatment program.  Reach maximal therapeutic benefit.    Please refer to the daily flowsheet for treatment today, total treatment time and time spent performing 1:1 timed codes.

## 2020-09-16 NOTE — LETTER
[facility] PT  [address]  [address]  [place] [address]  [phone]    2020    Re: Ginger Marshall   :   1940  MRN:  [id number]   REFERRING PHYSICIAN:   MD OPAL Huang PT  Date of Initial Evaluation:  2020  Visits:  Rxs Used: 1  Reason for Referral:     Chronic bilateral low back pain, unspecified whether sciatica present  Chronic right shoulder pain    EVALUATION SUMMARY    [facility] Initial Evaluation  Subjective:  The history is provided by the patient.   Patient Health History  Ginger Marshall being seen for Right shoulder and back.   Date of Onset: April initially, had another incident in August; MD referral 2020.   Problem occurred: fall in April   Pain is reported as 4/10 on pain scale.  General health as reported by patient is good.  Pertinent medical history includes: anemia, depression, implanted device, migraines/headaches, rheumatoid arthritis, overweight, osteoporosis and sleep disorder/apnea.   Other medical allergies details: sulfa, certain arthritis meds.   Surgeries include:  Orthopedic surgery. Other surgery history details: feet, knees, hip, back.    Current medications:  Anti-depressants.    Current occupation is retired.      Other job/home tasks details: general housework.                Therapist Generated HPI Evaluation  Type of problem:  Right shoulder (states the arm gets achy from upper arm to lower arm).    Patient reports pain:  Lateral.  Pain is described as aching (throughout arm) and is intermittent.    Symptoms are exacerbated by using arm at shoulder level, using arm overhead and lifting (driving, dishes, laundry)  and relieved by rest (will then go away until she uses her arm later).  Special tests included:  X-ray (Mild-to-moderate acromioclavicular degenerative changes.).    Re: Ginger Marshall   :   1940      Therapist Generated HPI Evaluation  Problem details: History of spinal fusion a  few years ago. Feels her upper back has been painful since that surgery.        Type of problem:  Lumbar.    This is a chronic condition.  Occurance: had another recent fall onto her coccyx, landing on hard surface while gardening.    Patient reports pain:  SI joint left, SI joint right and central lumbar spine (coccyx).    Symptoms are exacerbated by standing, walking, sitting and certain positions (transfers in/out of bed; sitting is painful with coccyx pressure)  and relieved by rest (lying down, biofreeze also helps).    Objective:  Gait:  Slow gait speed, no assistive device but patient uses 4WW at home. Favors R stance time vs L. Short L stance time and R step length       Lumbar/SI Evaluation  ROM:    AROM Lumbar:   Flexion:          To mid shin, very painful coming back up  Ext:                    Min loss, painful with repeated measures   Side Bend:        Left:  Mod restriction    Right:  Mod restriction  Rotation:           Left:     Right:   Side Glide:        Left:     Right:   Strength: 3+/5 hip abd - did not test hip ext    Neural Tension/Mobility:    Left side:SLR or SLR w/DF  negative.   Right side:   SLR w/DF or SLR  Negative.     Lumbar Palpation:  Palpation (lumbar): coccyx.  Tenderness not present at Left:    Erector Spinae; Piriformis or PSIS  Tenderness not present at Right:  Erector Spinae; Piriformis or PSIS    Functional Tests:  Core strength and proprioception lumbar: very slow with functional transfers; mod pain with sit to stand, 6/10 with sit to/from supine.    Shoulder Evaluation:  ROM:  AROM:    Flexion:  Left:  120    Right:  110  Abduction:  Left: 120   Right:  90  Internal Rotation:  Left:  T4    Right:  T7  External Rotation:  Left:  T1    Right:  T1    Re: Ginger Marshall   :   1940    Strength:    Flexion: Left:5/5   Pain:    Right: 4+/5     Pain:   Abduction:  Left: 5/5  Pain:    Right: 4+/5     Pain:  Internal Rotation:  Left:5/5     Pain:    Right: 5/5      Pain:  External Rotation:   Left:5/5     Pain:   Right:4+/5      Pain:++      Special Tests:    Right shoulder positive for the following special tests:Impingement    Palpation:    Right shoulder tenderness present at: Supraspinatus         General Evaluation:  Posture:    Standing:   Rounded shoulders  Sitting:  Rounded shoulders, thoracic kyphosis increased and lordosis decreased    Assessment/Plan:    Patient is a 80 year old female with lumbar and right side shoulder complaints.    Patient has the following significant findings with corresponding treatment plan.                Diagnosis 1:  Chronic LBP  Pain -  manual therapy  Decreased ROM/flexibility - manual therapy, therapeutic exercise and therapeutic activity  Decreased strength - therapeutic exercise and therapeutic activities  Decreased function - therapeutic activities  Impaired posture - neuro re-education    Therapy Evaluation Codes:   Cumulative Therapy Evaluation is: Low complexity.  Previous and current functional limitations:  (See Goal Flow Sheet for this information)    Short term and Long term goals: (See Goal Flow Sheet for this information)     Communication ability:  Patient appears to be able to clearly communicate and understand verbal and written communication and follow directions correctly.  Treatment Explanation - The following has been discussed with the patient:   RX ordered/plan of care  Anticipated outcomes  Possible risks and side effects  This patient would benefit from PT intervention to resume normal activities.   Rehab potential is good.    Frequency:  1 X week, once daily  Duration:  for 4 weeks tapering to 2 X a month over 4 weeks  Discharge Plan:  Achieve all LTG.  Independent in home treatment program.  Reach maximal therapeutic benefit.    Please refer to the daily flowsheet for treatment today, total treatment time and time spent performing 1:1 timed codes.         Re: Ginger Marshall   :   1940    Thank you for  your referral.    INQUIRIES  Therapist: Wesley Hazel, PT DPT  [facility]  [address]  [place] [address]  Phone: [phone]  Fax: [phone]

## 2020-09-22 ENCOUNTER — THERAPY VISIT (OUTPATIENT)
Dept: PHYSICAL THERAPY | Facility: CLINIC | Age: 80
End: 2020-09-22
Payer: COMMERCIAL

## 2020-09-22 DIAGNOSIS — G89.29 CHRONIC BILATERAL LOW BACK PAIN, UNSPECIFIED WHETHER SCIATICA PRESENT: ICD-10-CM

## 2020-09-22 DIAGNOSIS — M25.511 CHRONIC RIGHT SHOULDER PAIN: ICD-10-CM

## 2020-09-22 DIAGNOSIS — M54.50 CHRONIC BILATERAL LOW BACK PAIN, UNSPECIFIED WHETHER SCIATICA PRESENT: ICD-10-CM

## 2020-09-22 DIAGNOSIS — G89.29 CHRONIC RIGHT SHOULDER PAIN: ICD-10-CM

## 2020-09-22 PROCEDURE — 97110 THERAPEUTIC EXERCISES: CPT | Mod: GP | Performed by: PHYSICAL THERAPIST

## 2020-09-22 PROCEDURE — 97112 NEUROMUSCULAR REEDUCATION: CPT | Mod: GP | Performed by: PHYSICAL THERAPIST

## 2020-09-25 DIAGNOSIS — B00.9 HERPES SIMPLEX VIRUS INFECTION: ICD-10-CM

## 2020-09-25 RX ORDER — VALACYCLOVIR HYDROCHLORIDE 500 MG/1
TABLET, FILM COATED ORAL
Qty: 90 TABLET | Refills: 0 | Status: SHIPPED | OUTPATIENT
Start: 2020-09-25 | End: 2020-11-27

## 2020-09-30 ENCOUNTER — THERAPY VISIT (OUTPATIENT)
Dept: PHYSICAL THERAPY | Facility: CLINIC | Age: 80
End: 2020-09-30
Payer: COMMERCIAL

## 2020-09-30 DIAGNOSIS — G89.29 CHRONIC RIGHT SHOULDER PAIN: ICD-10-CM

## 2020-09-30 DIAGNOSIS — M54.50 CHRONIC BILATERAL LOW BACK PAIN, UNSPECIFIED WHETHER SCIATICA PRESENT: ICD-10-CM

## 2020-09-30 DIAGNOSIS — M25.511 CHRONIC RIGHT SHOULDER PAIN: ICD-10-CM

## 2020-09-30 DIAGNOSIS — G89.29 CHRONIC BILATERAL LOW BACK PAIN, UNSPECIFIED WHETHER SCIATICA PRESENT: ICD-10-CM

## 2020-09-30 PROCEDURE — 97110 THERAPEUTIC EXERCISES: CPT | Mod: GP | Performed by: PHYSICAL THERAPIST

## 2020-09-30 PROCEDURE — 97112 NEUROMUSCULAR REEDUCATION: CPT | Mod: GP | Performed by: PHYSICAL THERAPIST

## 2020-10-07 ENCOUNTER — THERAPY VISIT (OUTPATIENT)
Dept: PHYSICAL THERAPY | Facility: CLINIC | Age: 80
End: 2020-10-07
Payer: COMMERCIAL

## 2020-10-07 DIAGNOSIS — M54.50 CHRONIC BILATERAL LOW BACK PAIN, UNSPECIFIED WHETHER SCIATICA PRESENT: ICD-10-CM

## 2020-10-07 DIAGNOSIS — G89.29 CHRONIC RIGHT SHOULDER PAIN: ICD-10-CM

## 2020-10-07 DIAGNOSIS — M25.511 CHRONIC RIGHT SHOULDER PAIN: ICD-10-CM

## 2020-10-07 DIAGNOSIS — G89.29 CHRONIC BILATERAL LOW BACK PAIN, UNSPECIFIED WHETHER SCIATICA PRESENT: ICD-10-CM

## 2020-10-07 PROCEDURE — 97110 THERAPEUTIC EXERCISES: CPT | Mod: GP | Performed by: PHYSICAL THERAPIST

## 2020-10-07 NOTE — PROGRESS NOTES
DISCHARGE REPORT    Progress reporting period is from 9/16/2020 to 10/7/2020.       SUBJECTIVE  Subjective changes noted by patient:  No pain in shoulder with reaching and lifting, within lower weights. Back is much less painful, able to walk and sit for longer periods of time.    Subjective: Ginger notes that she has been feeling good the past couple of days. She says the exercises are helpful. Ginger would like to look at her back again today and says her shoulder has been pain free.     Current pain level is 0/10  .      Initial Pain level: 4/10.   Changes in function:  Yes (See Goal flowsheet attached for changes in current functional level)  Adverse reaction to treatment or activity: None    OBJECTIVE  Changes noted in objective findings:  Yes, much improved ROM in shoulder and back to premorbid levels, improved strength in shoulder and core activation too.    Objective: Shoulder AROM WNL B. Shoulder strength WNL except: flexion 4+ B, R abduction 4, ER 4+ B. No pain with lumbar flexion, mildly decreased lumbar extension AROM. Good TrA activation with minimal cueing. Difficult with sit to stand activity without UE support.     ASSESSMENT/PLAN  Updated problem list and treatment plan: Diagnosis 1:  Chronic LBP  Decreased strength - home program  Impaired posture - home program  Diagnosis 2:  L shoulder pain   Decreased strength - home program  STG/LTGs have been met or progress has been made towards goals:  Yes (See Goal flow sheet completed today.)  Assessment of Progress: The patient's condition has potential to improve.  Self Management Plans:  Patient is independent in a home treatment program.  I have re-evaluated this patient and find that the nature, scope, duration and intensity of the therapy is appropriate for the medical condition of the patient.  Ginger continues to require the following intervention to meet STG and LTG's:  PT intervention is no longer required to meet STG/LTG.    Recommendations:  This  patient is ready to be discharged from therapy and continue their home treatment program.    Please refer to the daily flowsheet for treatment today, total treatment time and time spent performing 1:1 timed codes.

## 2020-11-16 DIAGNOSIS — M05.79 RHEUMATOID ARTHRITIS INVOLVING MULTIPLE SITES WITH POSITIVE RHEUMATOID FACTOR (H): ICD-10-CM

## 2020-11-16 DIAGNOSIS — Z79.899 HIGH RISK MEDICATION USE: ICD-10-CM

## 2020-11-16 LAB
ALBUMIN SERPL-MCNC: 4.1 G/DL (ref 3.4–5)
ALP SERPL-CCNC: 47 U/L (ref 40–150)
ALT SERPL W P-5'-P-CCNC: 36 U/L (ref 0–50)
AST SERPL W P-5'-P-CCNC: 25 U/L (ref 0–45)
BASOPHILS # BLD AUTO: 0 10E9/L (ref 0–0.2)
BASOPHILS NFR BLD AUTO: 0.2 %
BILIRUB DIRECT SERPL-MCNC: <0.1 MG/DL (ref 0–0.2)
BILIRUB SERPL-MCNC: 0.4 MG/DL (ref 0.2–1.3)
CREAT SERPL-MCNC: 0.6 MG/DL (ref 0.52–1.04)
CRP SERPL-MCNC: <2.9 MG/L (ref 0–8)
DIFFERENTIAL METHOD BLD: ABNORMAL
EOSINOPHIL # BLD AUTO: 0.4 10E9/L (ref 0–0.7)
EOSINOPHIL NFR BLD AUTO: 6.1 %
ERYTHROCYTE [DISTWIDTH] IN BLOOD BY AUTOMATED COUNT: 16.1 % (ref 10–15)
ERYTHROCYTE [SEDIMENTATION RATE] IN BLOOD BY WESTERGREN METHOD: 18 MM/H (ref 0–30)
GFR SERPL CREATININE-BSD FRML MDRD: 87 ML/MIN/{1.73_M2}
HCT VFR BLD AUTO: 37.8 % (ref 35–47)
HGB BLD-MCNC: 12 G/DL (ref 11.7–15.7)
LYMPHOCYTES # BLD AUTO: 1.2 10E9/L (ref 0.8–5.3)
LYMPHOCYTES NFR BLD AUTO: 20.4 %
MCH RBC QN AUTO: 30.7 PG (ref 26.5–33)
MCHC RBC AUTO-ENTMCNC: 31.7 G/DL (ref 31.5–36.5)
MCV RBC AUTO: 97 FL (ref 78–100)
MONOCYTES # BLD AUTO: 0.7 10E9/L (ref 0–1.3)
MONOCYTES NFR BLD AUTO: 12.8 %
NEUTROPHILS # BLD AUTO: 3.5 10E9/L (ref 1.6–8.3)
NEUTROPHILS NFR BLD AUTO: 60.5 %
PLATELET # BLD AUTO: 361 10E9/L (ref 150–450)
PROT SERPL-MCNC: 7.6 G/DL (ref 6.8–8.8)
RBC # BLD AUTO: 3.91 10E12/L (ref 3.8–5.2)
WBC # BLD AUTO: 5.7 10E9/L (ref 4–11)

## 2020-11-16 PROCEDURE — 85025 COMPLETE CBC W/AUTO DIFF WBC: CPT | Performed by: INTERNAL MEDICINE

## 2020-11-16 PROCEDURE — 36415 COLL VENOUS BLD VENIPUNCTURE: CPT | Performed by: INTERNAL MEDICINE

## 2020-11-16 PROCEDURE — 82565 ASSAY OF CREATININE: CPT | Performed by: INTERNAL MEDICINE

## 2020-11-16 PROCEDURE — 85652 RBC SED RATE AUTOMATED: CPT | Performed by: INTERNAL MEDICINE

## 2020-11-16 PROCEDURE — 86140 C-REACTIVE PROTEIN: CPT | Performed by: INTERNAL MEDICINE

## 2020-11-16 PROCEDURE — 80076 HEPATIC FUNCTION PANEL: CPT | Performed by: INTERNAL MEDICINE

## 2020-11-24 ENCOUNTER — MYC MEDICAL ADVICE (OUTPATIENT)
Dept: RHEUMATOLOGY | Facility: CLINIC | Age: 80
End: 2020-11-24

## 2020-11-27 ENCOUNTER — OFFICE VISIT (OUTPATIENT)
Dept: FAMILY MEDICINE | Facility: CLINIC | Age: 80
End: 2020-11-27
Payer: COMMERCIAL

## 2020-11-27 VITALS
HEART RATE: 88 BPM | OXYGEN SATURATION: 96 % | HEIGHT: 65 IN | DIASTOLIC BLOOD PRESSURE: 76 MMHG | TEMPERATURE: 97.6 F | BODY MASS INDEX: 32.82 KG/M2 | WEIGHT: 197 LBS | SYSTOLIC BLOOD PRESSURE: 112 MMHG

## 2020-11-27 DIAGNOSIS — N94.9 GENITAL LESION, FEMALE: ICD-10-CM

## 2020-11-27 DIAGNOSIS — E04.2 MULTINODULAR GOITER: Chronic | ICD-10-CM

## 2020-11-27 DIAGNOSIS — R73.9 HYPERGLYCEMIA: ICD-10-CM

## 2020-11-27 DIAGNOSIS — Z00.00 ENCOUNTER FOR MEDICARE ANNUAL WELLNESS EXAM: Primary | ICD-10-CM

## 2020-11-27 DIAGNOSIS — B00.9 HERPES SIMPLEX VIRUS INFECTION: ICD-10-CM

## 2020-11-27 DIAGNOSIS — E78.5 HYPERLIPIDEMIA LDL GOAL <100: Chronic | ICD-10-CM

## 2020-11-27 DIAGNOSIS — Z12.31 ENCOUNTER FOR SCREENING MAMMOGRAM FOR BREAST CANCER: ICD-10-CM

## 2020-11-27 LAB
CHOLEST SERPL-MCNC: 188 MG/DL
HBA1C MFR BLD: 5.4 % (ref 0–5.6)
HDLC SERPL-MCNC: 89 MG/DL
LDLC SERPL CALC-MCNC: 71 MG/DL
NONHDLC SERPL-MCNC: 99 MG/DL
TRIGL SERPL-MCNC: 142 MG/DL
TSH SERPL DL<=0.005 MIU/L-ACNC: 1.12 MU/L (ref 0.4–4)

## 2020-11-27 PROCEDURE — 87252 VIRUS INOCULATION TISSUE: CPT | Mod: 90 | Performed by: NURSE PRACTITIONER

## 2020-11-27 PROCEDURE — 84443 ASSAY THYROID STIM HORMONE: CPT | Performed by: NURSE PRACTITIONER

## 2020-11-27 PROCEDURE — G0438 PPPS, INITIAL VISIT: HCPCS | Performed by: NURSE PRACTITIONER

## 2020-11-27 PROCEDURE — 80061 LIPID PANEL: CPT | Performed by: NURSE PRACTITIONER

## 2020-11-27 PROCEDURE — 99213 OFFICE O/P EST LOW 20 MIN: CPT | Mod: 25 | Performed by: NURSE PRACTITIONER

## 2020-11-27 PROCEDURE — 83036 HEMOGLOBIN GLYCOSYLATED A1C: CPT | Performed by: NURSE PRACTITIONER

## 2020-11-27 PROCEDURE — 36415 COLL VENOUS BLD VENIPUNCTURE: CPT | Performed by: NURSE PRACTITIONER

## 2020-11-27 PROCEDURE — 99000 SPECIMEN HANDLING OFFICE-LAB: CPT | Performed by: NURSE PRACTITIONER

## 2020-11-27 RX ORDER — VALACYCLOVIR HYDROCHLORIDE 500 MG/1
500 TABLET, FILM COATED ORAL 2 TIMES DAILY
Qty: 180 TABLET | Refills: 1 | Status: SHIPPED | OUTPATIENT
Start: 2020-11-27 | End: 2022-05-26

## 2020-11-27 RX ORDER — FERROUS SULFATE 324(65)MG
324 TABLET, DELAYED RELEASE (ENTERIC COATED) ORAL DAILY
COMMUNITY
End: 2022-07-12

## 2020-11-27 RX ORDER — ATORVASTATIN CALCIUM 40 MG/1
40 TABLET, FILM COATED ORAL DAILY
Qty: 90 TABLET | Refills: 3 | Status: SHIPPED | OUTPATIENT
Start: 2020-11-27 | End: 2021-12-31

## 2020-11-27 ASSESSMENT — ENCOUNTER SYMPTOMS
FREQUENCY: 0
HEMATOCHEZIA: 0
DYSURIA: 0
WEAKNESS: 0
HEMATURIA: 0
BREAST MASS: 0
SHORTNESS OF BREATH: 0
HEARTBURN: 0
JOINT SWELLING: 0
DIZZINESS: 0
CHILLS: 0
HEADACHES: 0
ARTHRALGIAS: 0
NERVOUS/ANXIOUS: 0
MYALGIAS: 1
CONSTIPATION: 0
SORE THROAT: 0
NAUSEA: 0
FEVER: 0
PALPITATIONS: 0
COUGH: 0
ABDOMINAL PAIN: 0
PARESTHESIAS: 1
DIARRHEA: 0
EYE PAIN: 0

## 2020-11-27 ASSESSMENT — MIFFLIN-ST. JEOR: SCORE: 1377.59

## 2020-11-27 ASSESSMENT — PATIENT HEALTH QUESTIONNAIRE - PHQ9: SUM OF ALL RESPONSES TO PHQ QUESTIONS 1-9: 3

## 2020-11-27 ASSESSMENT — ACTIVITIES OF DAILY LIVING (ADL): CURRENT_FUNCTION: NO ASSISTANCE NEEDED

## 2020-11-27 NOTE — PROGRESS NOTES
The patient was provided with suggestions to help her develop a healthy physical lifestyle.  She is at risk for lack of exercise and has been provided with information to increase physical activity for the benefit of her well-being.  The patient was counseled and encouraged to consider modifying their diet and eating habits. She was provided with information on recommended healthy diet options.  The patient was provided with written information regarding signs of hearing loss.  Information on urinary incontinence and treatment options given to patient.  The patient was provided with suggestions to help her develop a healthy emotional lifestyle.

## 2020-11-27 NOTE — PROGRESS NOTES
"SUBJECTIVE:   Ginger Marshall is a 80 year old female who presents for Preventive Visit.      Patient has been advised of split billing requirements and indicates understanding: Yes   Are you in the first 12 months of your Medicare coverage?  No    Healthy Habits:     In general, how would you rate your overall health?  Fair    Frequency of exercise:  None    Do you usually eat at least 4 servings of fruit and vegetables a day, include whole grains    & fiber and avoid regularly eating high fat or \"junk\" foods?  No    Taking medications regularly:  Yes    Medication side effects:  None    Ability to successfully perform activities of daily living:  No assistance needed    Home Safety:  No safety concerns identified    Hearing Impairment:  Difficulty following a conversation in a noisy restaurant or crowded room    In the past 6 months, have you been bothered by leaking of urine? Yes    In general, how would you rate your overall mental or emotional health?  Fair      PHQ-2 Total Score: 1    Additional concerns today:  No    Patient notes persistent genital sores for the past 8 months.  Sores have gone away and come back.  She notes intermittent swelling to her right groin and pain for a day at a time and then it will resolve.  She notes that lesions have gone away and come back 3 times.  She     Do you feel safe in your environment? Yes    Have you ever done Advance Care Planning? (For example, a Health Directive, POLST, or a discussion with a medical provider or your loved ones about your wishes): Yes, advance care planning is on file.      Fall risk  Fallen 2 or more times in the past year?: No  Any fall with injury in the past year?: No    Cognitive Screening   1) Repeat 3 items (Leader, Season, Table)    2) Clock draw: NORMAL  3) 3 item recall: Recalls 1 object   Results: NORMAL clock, 1-2 items recalled: COGNITIVE IMPAIRMENT LESS LIKELY    Mini-CogTM Copyright S Cecille. Licensed by the author for use in [facility]; reprinted with permission (soob@[facility]). All rights reserved.          Reviewed and updated as needed this visit by clinical staff  Tobacco  Allergies  Meds              Reviewed and updated as needed this visit by Provider                Social History     Tobacco Use     Smoking status: Former Smoker     Packs/day: 1.00     Years: 41.00     Pack years: 41.00     Types: Cigarettes     Quit date: 1999     Years since quittin.9     Smokeless tobacco: Never Used     Tobacco comment: former smoker   Substance Use Topics     Alcohol use: Never     Alcohol/week: 0.0 standard drinks     Frequency: Never         Alcohol Use 2020   Prescreen: >3 drinks/day or >7 drinks/week? No       Hyperlipidemia Follow-Up      Are you regularly taking any medication or supplement to lower your cholesterol?   Yes- atovastatin    Are you having muscle aches or other side effects that you think could be caused by your cholesterol lowering medication?  No      Current providers sharing in care for this patient include:   Patient Care Team:  Georgia Vázquez MD as PCP - General (Internal Medicine)  Georgia Vázquez MD as Assigned PCP  Kadeem Hunt MD as Assigned Musculoskeletal Provider  Aguila San MD as Assigned Surgical Provider    The following health maintenance items are reviewed in Epic and correct as of today:  Health Maintenance   Topic Date Due     URINE DRUG SCREEN  1940     MEDICARE ANNUAL WELLNESS VISIT  2007     FALL RISK ASSESSMENT  2020     PHQ-9  2020     LIPID  2020     EYE EXAM  2021     DEXA  2021     ADVANCE CARE PLANNING  2024     DTAP/TDAP/TD IMMUNIZATION (4 - Td) 2028     HEPATITIS C SCREENING  Completed     DEPRESSION ACTION PLAN  Completed     INFLUENZA VACCINE  Completed     Pneumococcal Vaccine: 65+ Years  Completed     Pneumococcal Vaccine: Pediatrics (0 to 5 Years) and At-Risk Patients (6 to 64 Years)  Aged  "Out     IPV IMMUNIZATION  Aged Out     MENINGITIS IMMUNIZATION  Aged Out     HEPATITIS B IMMUNIZATION  Aged Out     ZOSTER IMMUNIZATION  Discontinued       Review of Systems   Constitutional: Negative for chills and fever.   HENT: Negative for congestion, ear pain, hearing loss and sore throat.    Eyes: Negative for pain and visual disturbance.   Respiratory: Negative for cough and shortness of breath.    Cardiovascular: Negative for chest pain, palpitations and peripheral edema.   Gastrointestinal: Negative for abdominal pain, constipation, diarrhea, heartburn, hematochezia and nausea.   Breasts:  Negative for tenderness, breast mass and discharge.   Genitourinary: Positive for genital sores. Negative for dysuria, frequency, hematuria, pelvic pain, urgency, vaginal bleeding and vaginal discharge.   Musculoskeletal: Positive for myalgias. Negative for arthralgias and joint swelling.   Skin: Negative for rash.   Neurological: Positive for paresthesias. Negative for dizziness, weakness and headaches.   Psychiatric/Behavioral: Negative for mood changes. The patient is not nervous/anxious.          OBJECTIVE:   /76   Pulse 88   Temp 97.6  F (36.4  C) (Oral)   Ht 1.656 m (5' 5.2\")   Wt 81.6 kg (179 lb 12.8 oz)   SpO2 96%   BMI 29.74 kg/m   Estimated body mass index is 29.74 kg/m  as calculated from the following:    Height as of this encounter: 1.656 m (5' 5.2\").    Weight as of this encounter: 81.6 kg (179 lb 12.8 oz).  Physical Exam  GENERAL: healthy, alert and no distress  EYES: Eyes grossly normal to inspection, PERRL and conjunctivae and sclerae normal  HENT: ear canals and TM's normal, nose and mouth without ulcers or lesions  NECK: no adenopathy, no asymmetry, masses, or scars and thyroid normal to palpation  RESP: lungs clear to auscultation - no rales, rhonchi or wheezes  BREAST: normal without masses, tenderness or nipple discharge and no palpable axillary masses or adenopathy  CV: regular rate and " "rhythm, normal S1 S2, no S3 or S4, no murmur, click or rub, no peripheral edema and peripheral pulses strong  ABDOMEN: soft, nontender, no hepatosplenomegaly, no masses and bowel sounds normal  MS: no gross musculoskeletal defects noted, no edema  SKIN: scabbed lesions to mons pubis  NEURO: Normal strength and tone, mentation intact and speech normal  PSYCH: mentation appears normal, affect normal/bright  LYMPH: no cervical, supraclavicular, axillary, or inguinal adenopathy    Diagnostic Test Results:  pending    ASSESSMENT / PLAN:   1. Encounter for Medicare annual wellness exam      2. Hyperglycemia    - Hemoglobin A1c    3. Hyperlipidemia LDL goal <100  Stable.  Continue current treatment plan and medications.   - Lipid panel reflex to direct LDL Fasting  - atorvastatin (LIPITOR) 40 MG tablet; Take 1 tablet (40 mg) by mouth daily  Dispense: 90 tablet; Refill: 3    4. Multinodular goiter    - TSH with free T4 reflex  - US Thyroid; Future    5. Encounter for screening mammogram for breast cancer    - *MA Screening Digital Bilateral; Future    6. Genital lesion, female  Will confirm herpes virus.  Consider biopsy if negative and symptoms not improved.  - Viral Culture Non-respiratory    7. Herpes simplex virus infection  Patient to increase valacyclovir dose due to immunosuppression.  - valACYclovir (VALTREX) 500 MG tablet; Take 1 tablet (500 mg) by mouth 2 times daily  Dispense: 180 tablet; Refill: 1    Patient has been advised of split billing requirements and indicates understanding: Yes  COUNSELING:  Reviewed preventive health counseling, as reflected in patient instructions       Regular exercise       Healthy diet/nutrition    Estimated body mass index is 29.74 kg/m  as calculated from the following:    Height as of this encounter: 1.656 m (5' 5.2\").    Weight as of this encounter: 81.6 kg (179 lb 12.8 oz).        She reports that she quit smoking about 21 years ago. Her smoking use included cigarettes. She " has a 41.00 pack-year smoking history. She has never used smokeless tobacco.      Appropriate preventive services were discussed with this patient, including applicable screening as appropriate for cardiovascular disease, diabetes, osteopenia/osteoporosis, and glaucoma.  As appropriate for age/gender, discussed screening for colorectal cancer, prostate cancer, breast cancer, and cervical cancer. Checklist reviewing preventive services available has been given to the patient.    Reviewed patients plan of care and provided an AVS. The Basic Care Plan (routine screening as documented in Health Maintenance) for Ginger meets the Care Plan requirement. This Care Plan has been established and reviewed with the Patient.    Counseling Resources:  ATP IV Guidelines  Pooled Cohorts Equation Calculator  Breast Cancer Risk Calculator  Breast Cancer: Medication to Reduce Risk  FRAX Risk Assessment  ICSI Preventive Guidelines  Dietary Guidelines for Americans, 2010  USDA's MyPlate  ASA Prophylaxis  Lung CA Screening    AKIRA Jones CNP  [facility]    Identified Health Risks:

## 2020-11-27 NOTE — RESULT ENCOUNTER NOTE
Dear Ginger,    Your recent test results are attached.      Good 3 month blood sugar average.    If you have any questions please feel free to contact [phone] or myself via [employer].    Sincerely,  Dahiana Flores, CNP

## 2020-11-27 NOTE — PATIENT INSTRUCTIONS
Patient Education   Personalized Prevention Plan  You are due for the preventive services outlined below.  Your care team is available to assist you in scheduling these services.  If you have already completed any of these items, please share that information with your care team to update in your medical record.  Health Maintenance Due   Topic Date Due     URINE DRUG SCREEN  1940     FALL RISK ASSESSMENT  11/25/2020     Depression Assessment  12/16/2020     Cholesterol Lab  12/19/2020     Your Health Risk Assessment indicates you feel you are not in good health    A healthy lifestyle helps keep the body fit and the mind alert. It helps protect you from disease, helps you fight disease, and helps prevent chronic disease (disease that doesn't go away) from getting worse. This is important as you get older and begin to notice twinges in muscles and joints and a decline in the strength and stamina you once took for granted. A healthy lifestyle includes good healthcare, good nutrition, weight control, recreation, and regular exercise. Avoid harmful substances and do what you can to keep safe. Another part of a healthy lifestyle is stay mentally active and socially involved.    Good healthcare     Have a wellness visit every year.     If you have new symptoms, let us know right away. Don't wait until the next checkup.     Take medicines exactly as prescribed and keep your medicines in a safe place. Tell us if your medicine causes problems.   Healthy diet and weight control     Eat 3 or 4 small, nutritious, low-fat, high-fiber meals a day. Include a variety of fruits, vegetables, and whole-grain foods.     Make sure you get enough calcium in your diet. Calcium, vitamin D, and exercise help prevent osteoporosis (bone thinning).     If you live alone, try eating with others when you can. That way you get a good meal and have company while you eat it.     Try to keep a healthy weight. If you eat more calories than  your body uses for energy, it will be stored as fat and you will gain weight.     Recreation   Recreation is not limited to sports and team events. It includes any activity that provides relaxation, interest, enjoyment, and exercise. Recreation provides an outlet for physical, mental, and social energy. It can give a sense of worth and achievement. It can help you stay healthy.    Mental Exercise and Social Involvement  Mental and emotional health is as important as physical health. Keep in touch with friends and family. Stay as active as possible. Continue to learn and challenge yourself.   Things you can do to stay mentally active are:    Learn something new, like a foreign language or musical instrument.     Play SCRABBLE or do crossword puzzles. If you cannot find people to play these games with you at home, you can play them with others on your computer through the Internet.     Join a games club--anything from card games to chess or checkers or lawn bowling.     Start a new hobby.     Go back to school.     Volunteer.     Read.   Keep up with world events.    Exercise for a Healthier Heart     Exercise with a friend. When activity is fun, you're more likely to stick with it.   You may wonder how you can improve the health of your heart. If you re thinking about exercise, you re on the right track. You don t need to become an athlete. But you do need a certain amount of brisk exercise to help strengthen your heart. If you have been diagnosed with a heart condition, your healthcare provider may advise exercise to help stabilize your condition. To help make exercise a habit, choose safe, fun activities.   Before you start  Check with your healthcare provider before starting an exercise program. This is especially important if you have not been active for a while. It's also important if you have a long-term (chronic) health problem such as heart disease, diabetes, or obesity. Or if you are at high risk for having  these problems.   Why exercise?  Exercising regularly offers many healthy rewards. It can help you do all of the following:    Improve your blood cholesterol level to help prevent further heart trouble    Lower your blood pressure to help prevent a stroke or heart attack    Control diabetes, or reduce your risk of getting this disease    Improve your heart and lung function    Reach and stay at a healthy weight    Make your muscles stronger so you can stay active    Prevent falls and fractures by slowing the loss of bone mass (osteoporosis)    Manage stress better    Reduce your blood pressure    Improve your sense of self and your body image  Exercise tips      Ease into your routine. Set small goals. Then build on them. If you are not sure what your activity level should be, talk with your healthcare provider first before starting an exercise routine.    Exercise on most days. Aim for a total of 150 minutes (2 hours and 30 minutes) or more of moderate-intensity aerobic activity each week. Or 75 minutes (1 hour and 15 minutes) or more of vigorous-intensity aerobic activity each week. Or try for a combination of both. Moderate activity means that you breathe heavier and your heart rate increases but you can still talk. Think about doing 40 minutes of moderate exercise, 3 to 4 times a week. For best results, activity should last for about 40 minutes to lower blood pressure and cholesterol. It's OK to work up to the 40-minute period over time. Examples of moderate-intensity activity are walking 1 mile in 15 minutes. Or doing 30 to 45 minutes of yard work.    Step up your daily activity level.  Along with your exercise program, try being more active the whole day. Walk instead of drive. Or park further away so that you take more steps each day. Do more household tasks or yard work. You may not be able to meet the advised mount of physical activity. But doing some moderate- or vigorous-intensity aerobic activity can  help reduce your risk for heart disease. Your healthcare provider can help you figure out what is best for you.    Choose 1 or more activities you enjoy.  Walking is one of the easiest things you can do. You can also try swimming, riding a bike, dancing, or taking an exercise class.    When to call your healthcare provider  Call your healthcare provider if you have any of these:     Chest pain or feel dizzy or lightheaded    Burning, tightness, pressure, or heaviness in your chest, neck, shoulders, back, or arms    Abnormal shortness of breath    More joint or muscle pain    A very fast or irregular heartbeat (palpitations)  [employer] last reviewed this educational content on 7/1/2019 2000-2020 [employer]. [address] [place] [address], [place] [address]. All rights reserved. This information is not intended as a substitute for professional medical care. Always follow your healthcare professional's instructions.          [employer] MyPlate  The USDA (U.S. Department of Agriculture) has guidelines to help you make healthy food choices. These are called MyPlate. MyPlate shows the food groups that make up healthy meals using the image of a place setting. Before you eat, think about the healthiest choices for what to put onto your plate or into your cup or bowl. To learn more about building a healthy plate, visit www.choosemyplate.gov.    The food groups    Fruits. Any fruit or 100% fruit juice counts as part of the Fruit Group. Fruits may be fresh, canned, frozen, or dried, and may be whole, cut-up, or pureed. Make half your plate fruits and vegetables.    Vegetables. Any vegetable or 100% vegetable juice counts as a member of the Vegetable Group. Vegetables may be fresh, frozen, canned, or dried. They can be served raw or cooked and may be whole, cut-up, or mashed. Make half your plate fruits and vegetables.    Grains. All foods made from grains are part of the Grains Group. These include wheat,  rice, oats, cornmeal, and barley such as bread, pasta, oatmeal, cereal, tortillas, and grits. Grains should be no more than a quarter of your plate. At least half of your grains should be whole grains.    Protein. This group includes meat, poultry, seafood, beans and peas, eggs, processed soy products (like tofu), nuts (including nut butters), and seeds. Make protein choices no more than a quarter of your plate. Meat and poultry choices should be lean or low fat.    Dairy. All fluid milk products and foods made from milk that contain calcium, like yogurt and cheese, are part of the Dairy Group. (Foods that have little calcium, such as cream, butter, and cream cheese, are not part of the group.) Most dairy choices should be low-fat or fat-free.    Oils. These are fats that are liquid at room temperature. They include canola, corn, olive, soybean, and sunflower oil. Foods that are mainly oil include mayonnaise, certain salad dressings, and soft margarines. You should have only 5 to 7 teaspoons of oils a day. You probably already get this much from the food you eat.  [employer] last reviewed this educational content on 8/1/2017 2000-2020 The [employer]. [address] [place] [address]. All rights reserved. This information is not intended as a substitute for professional medical care. Always follow your healthcare professional's instructions.          Signs of Hearing Loss      Hearing much better with one ear can be a sign of hearing loss.   Hearing loss is a problem shared by many people. In fact, it is one of the most common health problems, particularly as people age. Most people age 65 and older have some hearing loss. By age 80, almost everyone does. Hearing loss often occurs slowly over the years. So you may not realize your hearing has gotten worse.  Have your hearing checked  Call your healthcare provider if you:    Have to strain to hear normal conversation    Have to watch other  people s faces very carefully to follow what they re saying    Need to ask people to repeat what they ve said    Often misunderstand what people are saying    Turn the volume of the television or radio up so high that others complain    Feel that people are mumbling when they re talking to you    Find that the effort to hear leaves you feeling tired and irritated    Notice, when using the phone, that you hear better with one ear than the other  StayWell last reviewed this educational content on 1/1/2020 2000-2020 The "[employer]. [address] [place] [address]. All rights reserved. This information is not intended as a substitute for professional medical care. Always follow your healthcare professional's instructions.          Urinary Incontinence, Female (Adult)   Urinary incontinence means loss of bladder control. This problem affects many women, especially as they get older. If you have incontinence, you may be embarrassed to ask for help. But know that this problem can be treated.   Types of Incontinence  There are different types of incontinence. Two of the main types are described here. You can have more than one type.     Stress incontinence. With this type, urine leaks when pressure (stress) is put on the bladder. This may happen when you cough, sneeze, or laugh. Stress incontinence most often occurs because the pelvic floor muscles that support the bladder and urethra are weak. This can happen after pregnancy and vaginal childbirth or a hysterectomy. It can also be due to excess body weight or hormone changes.    Urge incontinence (also called overactive bladder). With this type, a sudden urge to urinate is felt often. This may happen even though there may not be much urine in the bladder. The need to urinate often during the night is common. Urge incontinence most often occurs because of bladder spasms. This may be due to bladder irritation or infection. Damage to bladder nerves or  pelvic muscles, constipation, and certain medicines can also lead to urge incontinence.  Treatment depends on the cause. Further evaluation is needed to find the type you have. This will likely include an exam and certain tests. Based on the results, you and your healthcare provider can then plan treatment. Until a diagnosis is made, the home care tips below can help ease symptoms.   Home care    Do pelvic floor muscle exercises, if they are prescribed. The pelvic floor muscles help support the bladder and urethra. Many women find that their symptoms improve when doing special exercises that strengthen these muscles. To do the exercises, contract the muscles you would use to stop your stream of urine. But do this when you re not urinating. Hold for 10 seconds, then relax. Repeat 10 to 20 times in a row, at least 3 times a day. Your healthcare provider may give you other instructions for how to do the exercises and how often.    Keep a bladder diary. This helps track how often and how much you urinate over a set period of time. Bring this diary with you to your next visit with the provider. The information can help your provider learn more about your bladder problem.    Lose weight, if advised to by your provider. Extra weight puts pressure on the bladder. Your provider can help you create a weight-loss plan that s right for you. This may include exercising more and making certain diet changes.    Don't have foods and drinks that may irritate the bladder. These can include alcohol and caffeinated drinks.    Quit smoking. Smoking and other tobacco use can lead to a long-term (chronic) cough that strains the pelvic floor muscles. Smoking may also damage the bladder and urethra. Talk with your provider about treatments or methods you can use to quit smoking.    If drinking large amounts of fluid makes you have symptoms, you may be advised to limit your fluid intake. You may also be advised to drink most of your fluids  during the day and to limit fluids at night.    If you re worried about urine leakage or accidents, you may wear absorbent pads to catch urine. Change the pads often. This helps reduce discomfort. It may also reduce the risk of skin or bladder infections.    Follow-up care  Follow up with your healthcare provider, or as directed. It may take some to find the right treatment for your problem. But healthy lifestyle changes can be made right away. These include such things as exercising on a regular basis, eating a healthy diet, losing weight (if needed), and quitting smoking. Your treatment plan may include special therapies or medicines. Certain procedures or surgery may also be options. Talk about any questions you have with your provider.   When to seek medical advice  Call the healthcare provider right away if any of these occur:    Fever of 100.4 F (38 C) or higher, or as directed by your provider    Bladder pain or fullness    Belly swelling    Nausea or vomiting    Back pain    Weakness, dizziness, or fainting  [employer] last reviewed this educational content on 1/1/2020 2000-2020 The [employer]. [address] [place] [address]. All rights reserved. This information is not intended as a substitute for professional medical care. Always follow your healthcare professional's instructions.        Your Health Risk Assessment indicates you feel you are not in good emotional health.    Recreation   Recreation is not limited to sports and team events. It includes any activity that provides relaxation, interest, enjoyment, and exercise. Recreation provides an outlet for physical, mental, and social energy. It can give a sense of worth and achievement. It can help you stay healthy.    Mental Exercise and Social Involvement  Mental and emotional health is as important as physical health. Keep in touch with friends and family. Stay as active as possible. Continue to learn and challenge yourself.    Things you can do to stay mentally active are:    Learn something new, like a foreign language or musical instrument.     Play SCRABBLE or do crossword puzzles. If you cannot find people to play these games with you at home, you can play them with others on your computer through the Internet.     Join a games club--anything from card games to chess or checkers or lawn bowling.     Start a new hobby.     Go back to school.     Volunteer.     Read.   Keep up with world events.

## 2020-11-30 NOTE — RESULT ENCOUNTER NOTE
Dear Ginger,    Your recent test results are attached.      Good cholesterol.  Normal thyroid.    If you have any questions please feel free to contact [phone] or myself via [employer].    Sincerely,  Dahiana Flores, CNP

## 2020-12-03 ENCOUNTER — VIRTUAL VISIT (OUTPATIENT)
Dept: RHEUMATOLOGY | Facility: CLINIC | Age: 80
End: 2020-12-03
Payer: COMMERCIAL

## 2020-12-03 DIAGNOSIS — M05.79 RHEUMATOID ARTHRITIS INVOLVING MULTIPLE SITES WITH POSITIVE RHEUMATOID FACTOR (H): Chronic | ICD-10-CM

## 2020-12-03 DIAGNOSIS — M81.0 OSTEOPOROSIS WITHOUT CURRENT PATHOLOGICAL FRACTURE, UNSPECIFIED OSTEOPOROSIS TYPE: Primary | ICD-10-CM

## 2020-12-03 PROCEDURE — 99214 OFFICE O/P EST MOD 30 MIN: CPT | Mod: 95 | Performed by: INTERNAL MEDICINE

## 2020-12-03 NOTE — PROGRESS NOTES
"Ginger Marshall is a 80 year old female who is being evaluated via a billable video visit.      The patient has been notified of following:     \"This video visit will be conducted via a call between you and your physician/provider. We have found that certain health care needs can be provided without the need for an in-person physical exam.  This service lets us provide the care you need with a video conversation.  If a prescription is necessary we can send it directly to your pharmacy.  If lab work is needed we can place an order for that and you can then stop by our lab to have the test done at a later time.    Video visits are billed at different rates depending on your insurance coverage.  Please reach out to your insurance provider with any questions.    If during the course of the call the physician/provider feels a video visit is not appropriate, you will not be charged for this service.\"    Patient has given verbal consent for Video visit? Yes  How would you like to obtain your AVS? MyChart  If you are dropped from the video visit, the video invite should be resent to: Text to cell phone: [phone]  Will anyone else be joining your video visit? No      Samantha Morales CMA Rheumatology  12/3/2020 1:18 PM      Rheumatology Video Visit      Ginger Marshall MRN# [id number]   YOB: 1940 Age: 80 year old      Date of visit: 12/03/20   PCP: Georgia Vázquez MD     Chief Complaint   Patient presents with:  Arthritis: still having some stiffness and pain    Assessment and Plan     1. Seropositive ( [2009], CCP >100 [2009]; hx of rheumatoid nodule by 6/14/2011 pathology) Erosive Rheumatoid Arthritis: Previously failed remicade (staph infection during therapy, but was immediately after a joint injection), orencia (migraines), HCQ (ineffective).  Sulfa allergy.  Currently on methotrexate 25 mg SQ once weekly (dose reduction in the past resulted in more symptoms), folic acid 800 mcg daily, Xeljanz XR 11mg " "daily.  She also has prednisone 20 mg daily ×5 days to use as needed for flare.  RA appears well controlled at this time.  Doing well at this time.  - Continue methotrexate from 25mg SQ once weekly  - Continue folic acid from 800mcg daily to 1600mcg daily  - Continue Xeljanz XR 11mg daily  - Labs every 12 weeks: CBC, Creatinine, Hepatic Panel, ESR, CRP; I advised that she call to schedule the lab appointment              Rapid 3, cumulative scores                       9/11/2020: \"RA is doing well\" (MTX 25mg SQ wkly, Xeljanz XR 11mg daily)                      10/31/2019: 3.8 (MTX 25mg PO wkly, Xeljanz XR 11mg daily)                        07/11/2019: 8.2 (MTX 25mg PO wkly, HCQ 400mg daily, Xeljanz XR 11mg daily)                        03/06/2019: ?    (MTX 25mg PO wkly, HCQ 400mg daily, Xeljanz XR 11mg daily)                       12/12/2018: 7.5 (MTX 25mg wkly, HCQ 400mg daily, Xeljanz XR 11mg daily)                       08/08/2018: 7    (MTX 22.5mg wkly, Xeljanz XR 11mg daily)                       02/07/2018: 4    (MTX 22.5mg wkly, Xeljanz XR 11mg daily)                       11/08/2017: 5    (MTX 22.5mg wkly, Xeljanz XR 11mg daily)                       08/09/2017: 0    (MTX 22.5mg wkly, Xeljanz XR 11mg daily)                      05/10/2017: 5    (MTX 25mg wkly, Xeljanz XR 11mg daily) RA doing well    2. Right hip pain: Status post WALTER twice in the past. Followed by [facility].     3. 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 Michele who is now at [facility].  Advised that she restart physical therapy as physical therapy was helpful until it was stopped due to COVID-19 pandemic; she denies doing exercises at home.  Advised that she go to physical therapy again, and recommended that she establish a home exercise regimen so that she can do most of the physical therapy exercises at home to reduce risk for COVID-19 exposure.       4. " Osteoporosis: was previously managed by endocrinology and she received reclast once in 2016. 12/12/2018 DEXA ordered by Dr. Vázquez showed osteoporosis.  Again discussed the treatment options for osteoporosis, risks associated with the treatment, and the risk associated with not treating.  I recommended that she treat her osteoporosis but she has refused.  She verbalized understanding about the risks of not treating osteoporosis, and commented that Dr. Vázquez has told her the same thing.  Check labs and if okay then will restart Reclast.  Note that request was received in 2013, 2014, and 2016.  - Start reclast IV if labs are okay  - Lab: Calcium, vitamin D, parathyroid hormone    # Bisphosphonate risks and side effects:  Risks and side effects include esophageal irritation, heartburn, osteonecrosis of the jaw (most often in people with dental disease or a recent dental procedure), and atypical femoral fractures.  IV bisphosphonates can cause a flu-like illness and bone pain lasting up to 2-3 days; premedication with acetaminophen will often prevent or lessen these symptoms. Unusual side effects that have been reported include occular symptoms such as uveitis, keartitis, optic neuritis, and orbital swelling.  Oral bisphosphonates should not be used in patients with esophageal problems (such as strictures, achalasia, or severe dysmotility, varices), malabsorption, or the inability to sit upright.  Oral and IV bisphosphonates should not be used if the CrCl is less than 25-40mL/min, or the patient is pregnant or breastfeeding.  Prior to starting a biosphosphonate, invasive dental work should be completed if needed, and vitamin D level should be adequate.    5. Left 1st toe pain, history: 2 episodes of sudden onset left toe pain followed by diffuse left foot pain that made ambulation difficult.  Also with nodules over both Achilles tendons and the right elbow that are either rheumatoid nodules or tophi.  She reports having  history of gout decades ago.  Denies ever being on colchicine or allopurinol.  Discussed colchicine to use if suspected gout flare occurs.  No flares since last seen so has not used colchicine.  - Colchicine: (MITIGARE) 0.6 MG capsule; Take 2 capsules (1.2 mg) by mouth once for 1 dose at the onset of a gout flare, followed by 1 capsule (0.6mg) 1 hour later.  Dispense: 3 capsule; Refill: 0    6.  Right shoulder pain, history: Consistent with impingement syndrome of the right shoulder; improved with physical therapy      # Relevant labs and imaging were reviewed with the patient    # High risk medication toxicity monitoring: discussion and labs reviewed; appropriate labs ordered. See above.  Instructed that if confirmed to have COVID-19 or exposure to someone with confirmed COVID-19 to call this clinic for directions on DMARD management.    # Note that this is a virtual visit to reduce the risk of COVID-19 exposure during this current pandemic.      # Considered to be at high risk of complications from the COVID-19 virus.  It is recommended to limit contact with other people and if possible to work remotely or provide a leave of absence to reduce the risk for COVID-19.      Ms. Marshall verbalized agreement with and understanding of the rational for the diagnosis and treatment plan.  All questions were answered to best of my ability and the patient's satisfaction. Ms. Marshall was advised to contact the clinic with any questions that may arise after the clinic visit.      Thank you for involving me in the care of the patient    Return to clinic: 3-4 months    HPI   Ginger Masrhall is a 80 year old female with a history of multiple foot surgeries, hand tendon transfers, MCP replacements (most recent being in August 2015), bilateral TKA, right WALTER, left distal radius fracture history, gout, s/p lumbar fusion, osteoporosis and seropositive (RF+, CCP+) erosive rheumatoid arthritis who presents for follow-up of rheumatoid  arthritis.      Today, 12/18/2020: She reports that her arthritis is doing well.  Therapy for her joints has helped, especially the shoulder.  Ready to start Reclast for osteoporosis management.  Tired of COVID-19.  Morning stiffness for less than 1 hour.    Denies fevers, chills, nausea, vomiting, constipation, diarrhea. No abdominal pain. No chest pain/pressure, palpitations, or shortness of breath. No oral sores.   . No rash. No LE swelling.     Tobacco: quit in 1999  EtOH: 1 glass of wine every month at most  Drugs: None  Occupation: , retired     ROS   GEN: No fevers, chills  SKIN: No rash  HEENT:  No oral ulcers. see HPI  CV: No chest pain, pressure, palpitations, or dyspnea on exertion.  PULM: No SOB, wheeze, cough.  GI:  No nausea, vomiting, constipation, diarrhea. No blood in stool. No abdominal pain.  : No blood in urine.  MSK: See HPI.  NEURO: No numbness or tingling  EXT: No LE swelling    Active Problem List     Patient Active Problem List   Diagnosis     Rheumatoid arthritis involving right hand with positive rheumatoid factor (H)     Menopause     History of colonic polyps     Mitral Regurgitation     Multinodular goiter     CARDIOVASCULAR SCREENING; LDL GOAL LESS THAN 130     Psychophysiologic insomnia     Pulmonary nodule     PSEUDOPHAKIA OU     PVD (POSTERIOR VITREOUS DETACHMENT) OU     PXF (PSEUDOEXFOLIATION OF LENS CAPSULE) OD     GERD (gastroesophageal reflux disease)     Hyperlipidemia LDL goal <100     Advance Care Planning     Neuropathy     SHERRY (obstructive sleep apnea)-severe (AHI 35)     zEncounter for counseling     Anemia of chronic disease     Migraine     Osteoporosis     Cornea guttata, ou     Conjunctival concretions     Stenosis, spinal, lumbar     Other chronic pain     Obesity     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     MGD (meibomian gland dysfunction)     Blepharitis of both eyes     Personal history of healed osteoporosis fracture     Iron  malabsorption     Hyperglycemia     Chronic bilateral low back pain without sciatica     Allergic state, subsequent encounter     BMI 32.0-32.9,adult     Spinal stenosis of lumbar region without neurogenic claudication     Herniated nucleus pulposus, L3-4     S/P lumbar fusion     Anxiety     Low iron     BMI 28.0-28.9,adult     History of depression     DDD (degenerative disc disease), lumbar     Closed fracture of multiple ribs of left side, initial encounter     Chronic bilateral low back pain, unspecified whether sciatica present     Chronic right shoulder pain     Past Medical History     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     History of total knee replacement 7/23/2009     Menopause late 40's     Other chronic pain     joints     Pelvic fracture (H) 5/13/2014     PUD (peptic ulcer disease)      Rheumatoid arteritis (H)      Sleep apnea     Uses a CPAP     Vitamin B12 deficiency      Past Surgical History     Past Surgical History:   Procedure Laterality Date     ABDOMEN SURGERY      c-sections     ARTHROSCOPY KNEE RT/LT  01/06    left     BACK SURGERY  2013    disc     BREAST BIOPSY, RT/LT      left benign     BREAST SURGERY  90's    lumpectomy     C ANESTH,TOTAL HIP ARTHROPLASTY  2010    Rt hip     C HAND/FINGER SURGERY UNLISTED  11/05    right hand     C TOTAL KNEE ARTHROPLASTY  2006    left     C/SECTION, LOW TRANSVERSE  66,74    x 2     CATARACT IOL, RT/LT       COLONOSCOPY  2006,2009     EYE SURGERY      cataracs     HC ESOPH/GAS REFLUX TEST W NASAL IMPED >1 HR  2/1/2012    Procedure:ESOPHAGEAL IMPEDENCE FUNCTION TEST WITH 24 HOUR PH GREATER THAN 1 HOUR; Surgeon:KAYKAY JULIO; Location:[facility]     IR TRANSLAMINAR EPIDURAL LUMBAR INJ INCL IMAGING  5/2/2012    LESI L5-S1 at [facility]     OPTICAL TRACKING SYSTEM FUSION POSTERIOR SPINE LUMBAR N/A 4/3/2017     "Procedure: OPTICAL TRACKING SYSTEM FUSION SPINE POSTERIOR LUMBAR ONE LEVEL;  Surgeon: Anthony Michele MD;  Location: [facility]     SURGICAL HISTORY OF -   2007    right knee total replacement     ZZC NONSPECIFIC PROCEDURE  91    left foot surgery     ZZC NONSPECIFIC PROCEDURE  95    R MCP surgery     Allergy     Allergies   Allergen Reactions     Abatacept Other (See Comments)     Severe headaches  Migraine     Celebrex [Celecoxib]      Ineffective     Celecoxib Unknown and Nausea     Ethanol      Antihistamines     Orencia [Abatacept]      Headache     Septra [Bactrim]      Sulfa Drugs Nausea and Vomiting     \"deathly ill\"  Allergic to everything with Sulfa in it.     Sulfamethoxazole-Trimethoprim Nausea and Nausea and Vomiting     Tramadol Other (See Comments)     Headache  Migraine headache     Valdecoxib Unknown and Nausea     Made me \"very very ill\", might of been \"cramping\"     Adhesive Tape Rash     Plastic tape  Plastic tapes     Antihistamines, Chlorpheniramine-Type  [Alkylamines] Anxiety and Other (See Comments)     Current Medication List     Current Outpatient Medications   Medication Sig     acetaminophen (TYLENOL) 500 MG tablet Take 2 tablets (1,000 mg) by mouth every 8 hours as needed for pain     atorvastatin (LIPITOR) 40 MG tablet Take 1 tablet (40 mg) by mouth daily     Cholecalciferol (VITAMIN D-3 PO) Take 1,000 Units by mouth 2 times daily     desvenlafaxine (PRISTIQ) 100 MG 24 hr tablet Take 1 tablet (100 mg) by mouth daily     Ferrous Sulfate 324 (65 Fe) MG TBEC Take 324 mg by mouth daily     folic acid 800 MCG tablet Take 2 tablets (1,600 mcg) by mouth daily     ibuprofen (ADVIL) 200 MG tablet Take 200 mg by mouth every 4 hours as needed for mild pain     Methotrexate, PF, (RASUVO) 25 MG/0.5ML autoinjector Inject 0.5 mLs (25 mg) Subcutaneous every 7 days . Hold for signs of infection, and seek medical attention.     naproxen sodium 220 MG capsule Take 220 mg by mouth 2 times daily (with " meals)     oxyCODONE (ROXICODONE) 5 MG tablet Take 1 tablet (5 mg) by mouth every 6 hours as needed for moderate to severe pain     pantoprazole (PROTONIX) 40 MG EC tablet TAKE ONE TABLET BY MOUTH EVERY DAY 30-60 MINUTES BEFORE A MEAL     sucralfate (CARAFATE) 1 GM tablet TAKE ONE TABLET BY MOUTH FOUR TIMES A DAY AS NEEDED FOR HEARTBURN     tofacitinib (XELJANZ XR) 11 MG 24 hr tablet Take 1 tablet (11 mg) by mouth daily     topiramate (TOPAMAX) 50 MG tablet Take 1 tablet (50 mg) by mouth 2 times daily     triamcinolone (KENALOG) 0.1 % external cream Apply topically 2 times daily as needed for irritation     valACYclovir (VALTREX) 500 MG tablet Take 1 tablet (500 mg) by mouth 2 times daily     order for DME Equipment being ordered: Up walker     No current facility-administered medications for this visit.      Social History   See HPI    Family History     Family History   Problem Relation Age of Onset     Arthritis Mother      Alzheimer Disease Mother      Hyperlipidemia Mother      Osteoporosis Mother      Heart Disease Father         MI ( from this)     Alcohol/Drug Father      Arthritis Sister      Hypertension Sister      Cancer Son      Diabetes Son      Neurologic Disorder Sister         Schizophrenic     Hypertension Sister      Hyperlipidemia Sister      Mental Illness Sister      Diabetes Son      Other Cancer Son      Glaucoma Daughter      No change in family history since the previous clinic visit.    Physical Exam     Temp Readings from Last 3 Encounters:   20 97.6  F (36.4  C) (Oral)   20 97.8  F (36.6  C) (Oral)   20 98.1  F (36.7  C) (Oral)     BP Readings from Last 5 Encounters:   20 112/76   20 128/74   20 132/58   20 130/85   20 124/68     Pulse Readings from Last 1 Encounters:   20 88     Resp Readings from Last 1 Encounters:   20 16     Estimated body mass index is 32.58 kg/m  as calculated from the following:    Height as of  "11/27/20: 1.656 m (5' 5.2\").    Weight as of 11/27/20: 89.4 kg (197 lb).      GEN: NAD. Healthy appearing adult.   HEENT: MMM.  Anicteric, noninjected sclera. No obvious external lesions of the ear and nose. Hearing intact.  PULM: No increased work of breathing  MSK:  Hands and wrists without swelling.  Shoulders with normal range of motion  SKIN: No rash or jaundice seen  PSYCH: Alert. Appropriate.        Labs     CBC  Recent Labs   Lab Test 11/16/20  1028 08/18/20  0950 05/22/20  1026   WBC 5.7 6.3 7.0   RBC 3.91 3.73* 3.76*   HGB 12.0 10.9* 11.5*   HCT 37.8 35.6 36.3   MCV 97 95 97   RDW 16.1* 16.0* 15.1*    363 347   MCH 30.7 29.2 30.6   MCHC 31.7 30.6* 31.7   NEUTROPHIL 60.5 63.3 67.5   LYMPH 20.4 17.0 15.2   MONOCYTE 12.8 13.5 12.6   EOSINOPHIL 6.1 6.0 4.6   BASOPHIL 0.2 0.2 0.1   ANEU 3.5 4.0 4.7   ALYM 1.2 1.1 1.1   MARYURI 0.7 0.9 0.9   AEOS 0.4 0.4 0.3   ABAS 0.0 0.0 0.0     CMP  Recent Labs   Lab Test 11/16/20  1028 08/18/20  0950 05/22/20  1026 03/27/20  1413 12/19/19  0957 12/19/19  0957 08/15/19  1118 08/15/19  1118   NA  --   --   --  140  --  142  --  144   POTASSIUM  --   --   --  3.4  --  3.8  --  3.5   CHLORIDE  --   --   --  112*  --  111*  --  112*   CO2  --   --   --  24  --  24  --  25   ANIONGAP  --   --   --  4  --  7  --  7   GLC  --   --   --  98  --  88  --  95   BUN  --   --   --  18  --  10  --  16   CR 0.60 0.59 0.64 0.57   < > 0.68   < > 0.57   GFRESTIMATED 87 88 86 89   < > 84   < > 89   GFRESTBLACK >90 >90 >90 >90   < > >90   < > >90   BRANDEE  --   --   --  9.1  --  9.4  --  9.6   BILITOTAL 0.4 0.3 0.4  --    < > 0.3   < >  --    ALBUMIN 4.1 3.8 4.1  --    < > 4.0   < >  --    PROTTOTAL 7.6 7.5 7.7  --    < > 7.6   < >  --    ALKPHOS 47 45 49  --    < > 45   < >  --    AST 25 15 19  --    < > 19   < >  --    ALT 36 25 23  --    < > 22   < >  --     < > = values in this interval not displayed.     Calcium/VitaminD  Recent Labs   Lab Test 03/27/20  1413 12/19/19  0957 08/15/19  1118 " "03/06/19  1351 10/28/16  1239 10/28/16  1239 02/05/13  1050 02/05/13  1050   BRANDEE 9.1 9.4 9.6 9.3   < > 9.4   < > 9.3   VITDT  --   --   --  41  --  37  --  33    < > = values in this interval not displayed.     ESR/CRP  Recent Labs   Lab Test 11/16/20  1028 08/18/20  0950 05/22/20  1026   SED 18 29 16   CRP <2.9 <2.9 <2.9     Lipid Panel  Recent Labs   Lab Test 11/27/20  1043 12/19/19  0957 11/23/18  1309 12/14/17  0957 01/13/14  1109 01/13/14  1109 07/24/13  1024   CHOL 188 166  --  164   < > 135 177   TRIG 142 113  --  121   < > 89 80   HDL 89 89  --  79   < > 49* 89   LDL 71 54 72 61   < > 68 72   VLDL  --   --   --   --   --  18 16   CHOLHDLRATIO  --   --   --   --   --  2.8 2.0   NHDL 99 77  --  85   < >  --   --     < > = values in this interval not displayed.     Hepatitis B  Recent Labs   Lab Test 09/15/15  1159   AUSAB 0.11   HBCAB Nonreactive   HEPBANG Nonreactive     Hepatitis C  Recent Labs   Lab Test 09/15/15  1159   HCVAB Nonreactive   Assay performance characteristics have not been established for newborns,   infants, and children       Tuberculosis Screening  Recent Labs   Lab Test 05/07/18  1033 09/15/15  1200   TBRSLT Negative Negative   TBAGN 0.00 0.00         \"HAND BILATERAL THREE OR MORE VIEWS 9/15/2015 12:28 PM   HISTORY: Rheumatoid arthritis; establish baseline. Rheumatoid  arthritis(714.0)  COMPARISON: None  IMPRESSION  IMPRESSION: There is diffuse osteopenia. Postoperative changes of the  right second and third metacarpophalangeal joints. Moderate joint  space narrowing involving the left second and third  metacarpophalangeal joints. Mild joint space narrowing of the right  fourth and fifth metacarpophalangeal joints. There are also small  periarticular erosive changes of the metacarpophalangeal joints. There  is fusion of several of the right carpal bones. Marked joint space  loss in the left wrist. Findings are consistent with the clinical  history of rheumatoid arthritis. Chronic fracture " "deformities of the  right distal radius and ulna. No acute fracture is seen.  SPENCER MONSALVE MD\"    Immunization History     Immunization History   Administered Date(s) Administered     FLU 6-35 months 10/24/2006, 11/14/2007, 10/22/2008, 10/21/2009     Flu, Unspecified 10/20/2013     Influenza (High Dose) 3 valent vaccine 10/28/2013, 09/23/2014, 11/11/2015, 09/23/2016, 09/24/2019     Influenza (IIV3) PF 10/12/1999, 10/26/2001, 10/19/2002, 10/30/2003, 10/28/2004, 10/21/2005, 10/26/2007, 10/21/2009, 10/05/2011, 10/13/2012     Influenza Vaccine IM > 6 months Valent IIV4 09/24/2020     Influenza Vaccine Im 4yrs+ 4 Valent CCIIV4 09/28/2017, 09/27/2018     Mantoux Tuberculin Skin Test 11/03/2006     Pneumo Conj 13-V (2010&after) 07/29/2015     Pneumococcal 23 valent 06/26/2000, 10/26/2001, 06/01/2007, 06/02/2010     TD (ADULT, 7+) 12/10/2008, 07/20/2009     Tdap (Adult) Unspecified Formulation 12/12/2018     Zoster vaccine recombinant adjuvanted (SHINGRIX) 12/12/2018, 02/14/2019          Chart documentation done in part with Dragon Voice recognition Software. Although reviewed after completion, some word and grammatical error may remain.    Video-Visit Details    Type of service:  Video Visit    Video Start Time: 2:35 PM  Video End Time: 2:51 PM    Originating Location (pt. Location): [place], [place]    Distant Location (provider location):  Home    Platform used for Video Visit: Roxanne Byers MD      "

## 2020-12-04 ENCOUNTER — ANCILLARY PROCEDURE (OUTPATIENT)
Dept: ULTRASOUND IMAGING | Facility: CLINIC | Age: 80
End: 2020-12-04
Attending: NURSE PRACTITIONER
Payer: COMMERCIAL

## 2020-12-04 DIAGNOSIS — E04.2 MULTINODULAR GOITER: Chronic | ICD-10-CM

## 2020-12-07 NOTE — RESULT ENCOUNTER NOTE
Dear Ginger,    Your recent test results are attached.      You have 2 new thyroid nodules.  They do not meet criteria for biopsy at this time.  I would recommend a repeat US again in 1 year.  Your other thyroid nodules are stable.    If you have any questions please feel free to contact [phone] or myself via [employer].    Sincerely,  Dahiana Flores, CNP

## 2020-12-09 PROBLEM — S22.42XA CLOSED FRACTURE OF MULTIPLE RIBS OF LEFT SIDE, INITIAL ENCOUNTER: Status: RESOLVED | Noted: 2019-11-25 | Resolved: 2020-12-09

## 2020-12-09 PROBLEM — Z98.1 S/P LUMBAR FUSION: Status: RESOLVED | Noted: 2017-04-03 | Resolved: 2020-12-09

## 2020-12-09 PROBLEM — K52.9 GASTROENTERITIS: Status: RESOLVED | Noted: 2020-03-21 | Resolved: 2020-12-09

## 2020-12-09 PROBLEM — K52.9 GASTROENTERITIS: Status: ACTIVE | Noted: 2020-03-21

## 2020-12-09 LAB
SPECIMEN SOURCE: NORMAL
VIRUS SPEC CULT: NORMAL
VIRUS SPEC CULT: NORMAL

## 2020-12-09 ASSESSMENT — MIFFLIN-ST. JEOR: SCORE: 1381.26

## 2020-12-09 NOTE — PATIENT INSTRUCTIONS

## 2020-12-09 NOTE — PROGRESS NOTES
"Ginger Marshall is a 80 year old female who is being evaluated via a billable video visit.      The patient has been notified of following:     \"This video visit will be conducted via a call between you and your physician/provider. We have found that certain health care needs can be provided without the need for an in-person physical exam.  This service lets us provide the care you need with a video conversation.  If a prescription is necessary we can send it directly to your pharmacy.  If lab work is needed we can place an order for that and you can then stop by our lab to have the test done at a later time.    Video visits are billed at different rates depending on your insurance coverage.  Please reach out to your insurance provider with any questions.    If during the course of the call the physician/provider feels a video visit is not appropriate, you will not be charged for this service.\"    Patient has given verbal consent for Video visit? Yes  How would you like to obtain your AVS? MyChart  If you are dropped from the video visit, the video invite should be resent to: Send to e-mail at: [email or web address]  Will anyone else be joining your video visit? No        Video-Visit Details    Type of service:  Video Visit    Video Start Time: 11:09 AM  Video End Time: 11:47 AM    Originating Location (pt. Location): Home    Distant Location (provider location):  [facility]     Platform [facility]              Sleep Consultation:    Date on this visit: 12/10/2020    Ginger Marshall is sent by Georgia Vázquez for a sleep consultation regarding fatigue, obstructive sleep apnea .    Primary Physician: Georgia Vázquez     Chief Complaint   Patient presents with     Consult         Patient was originally seen 1/2014 for complaints of snoring, witnessed apnea, non-restorative sleep (ESS 6), persistent disorder of initiating and maintaining sleep, and mild RLS. "     Polysomnogram 1/24/2014 (214#) - AHI 35.4, RDI 55.1, O2 magy 88%. CPAP was titrated to an effective pressure of 9 cm/H20 in lateral REM sleep. PLM index 46.6 on CPAP, PLM index on CPAP was 99.       She started CPAP 9 cm/H20 on 2/11/2014. She had some residual apnea on download and her CPAP pressure was changed to 9-15 cm/H20. This improved her apnea but AHI was still 6. At follow-up 6/2014 she was found to have even higher AHI: 23, 14 obstructed, 7 centrals. But was noted to be on narcotics after hip surgery. Her autopap was changed to 9-20, but she then appeared to develop worsened central apneas 9/2014 and was changed back to 9-13cm. At follow-up 10/2014 the AHI was 5.0. At visit 11/2015 AHI elevated at 10.5, appears mostly obstructive in nature by download but patient taking narcotics again for an arm fracture, increased pressure 12-14cm.     At 11/2017 visit she reported she lost 35#. Rationale for continued treatment was soft, but reviewed. Recommended to continue current treatments. AHI 6, changed pressures to 12-15cm.     She stopped CPAP on her own due to machine 'making too much noise and keeping her awake'.     She did feel less sleepy during the day with CPAP    Ginger goes to bed at 9-10 PM during the week. She wakes up at 6-6:30 AM without an alarm. She falls asleep in 5 minutes.  Ginger denies difficulty falling asleep.  She wakes up 1 times a night without difficulty falling back to sleep unless its after 430 AM.   On weekends, schedule is similar.    Patient does not use electronics in bed and watch TV in bed.     Ginger does not work.      Patient does not have a regular bed partner. She sleeps with her dog. Patient sleeps on her side. She can't sleep on her back because she can't breathe. She denies no morning headaches and restless legs.     Ginger denies any sleep walking, sleep talking and dream enactment.    Ginger denies reflux at night.      Ginger has gained 15-20 pounds since her sleep study.    "Patient's [place] Sleepiness score 11/24 consistent with daytime sleepiness.  She also has some fatigue    Ginger naps 1 times per day for 20-30 minutes in afternoon in front of the TV. She takes frequent inadvertant naps when reading.  She denies dozing while driving. She uses 4 cups/day of coffee. Last caffeine intake is usually before 830.    She is on devenlafaxine for 3-4 months for 'anxiety' she says.   She is on topamax for a few years  She is not on oxycodone any longer    Recent Labs   Lab Test 11/16/20  1028 08/18/20  0950 03/27/20  1413 03/27/20  1413 12/19/19  0957 12/19/19  0957   NA  --   --   --  140  --  142   POTASSIUM  --   --   --  3.4  --  3.8   CHLORIDE  --   --   --  112*  --  111*   CO2  --   --   --  24  --  24   ANIONGAP  --   --   --  4  --  7   GLC  --   --   --  98  --  88   BUN  --   --   --  18  --  10   CR 0.60 0.59   < > 0.57   < > 0.68   BRANDEE  --   --   --  9.1  --  9.4    < > = values in this interval not displayed.     CBC RESULTS:   Recent Labs   Lab Test 11/16/20  1028   WBC 5.7   RBC 3.91   HGB 12.0   HCT 37.8   MCV 97   MCH 30.7   MCHC 31.7   RDW 16.1*        Lab Results   Component Value Date    TSH 1.12 11/27/2020       Allergies:    Allergies   Allergen Reactions     Abatacept Other (See Comments)     Severe headaches  Migraine     Celebrex [Celecoxib]      Ineffective     Celecoxib Unknown and Nausea     Ethanol      Antihistamines     Orencia [Abatacept]      Headache     Septra [Bactrim]      Sulfa Drugs Nausea and Vomiting     \"deathly ill\"  Allergic to everything with Sulfa in it.     Sulfamethoxazole-Trimethoprim Nausea and Nausea and Vomiting     Tramadol Other (See Comments)     Headache  Migraine headache     Valdecoxib Unknown and Nausea     Made me \"very very ill\", might of been \"cramping\"     Adhesive Tape Rash     Plastic tape  Plastic tapes     Antihistamines, Chlorpheniramine-Type  [Alkylamines] Anxiety and Other (See Comments)       Medications:  "   Current Outpatient Medications   Medication Sig Dispense Refill     acetaminophen (TYLENOL) 500 MG tablet Take 2 tablets (1,000 mg) by mouth every 8 hours as needed for pain 100 tablet 0     atorvastatin (LIPITOR) 40 MG tablet Take 1 tablet (40 mg) by mouth daily 90 tablet 3     Cholecalciferol (VITAMIN D-3 PO) Take 1,000 Units by mouth 2 times daily       desvenlafaxine (PRISTIQ) 100 MG 24 hr tablet Take 1 tablet (100 mg) by mouth daily 90 tablet 1     Ferrous Sulfate 324 (65 Fe) MG TBEC Take 324 mg by mouth daily       folic acid 800 MCG tablet Take 2 tablets (1,600 mcg) by mouth daily       ibuprofen (ADVIL) 200 MG tablet Take 200 mg by mouth every 4 hours as needed for mild pain       Methotrexate, PF, (RASUVO) 25 MG/0.5ML autoinjector Inject 0.5 mLs (25 mg) Subcutaneous every 7 days . Hold for signs of infection, and seek medical attention. 2 mL 4     naproxen sodium 220 MG capsule Take 220 mg by mouth 2 times daily (with meals)       order for DME Equipment being ordered: Up walker 1 Device 0     pantoprazole (PROTONIX) 40 MG EC tablet TAKE ONE TABLET BY MOUTH EVERY DAY 30-60 MINUTES BEFORE A MEAL 90 tablet 3     sucralfate (CARAFATE) 1 GM tablet TAKE ONE TABLET BY MOUTH FOUR TIMES A DAY AS NEEDED FOR HEARTBURN 120 tablet 1     tofacitinib (XELJANZ XR) 11 MG 24 hr tablet Take 1 tablet (11 mg) by mouth daily 90 tablet 2     topiramate (TOPAMAX) 50 MG tablet Take 1 tablet (50 mg) by mouth 2 times daily 180 tablet 11     triamcinolone (KENALOG) 0.1 % external cream Apply topically 2 times daily as needed for irritation 30 g 1     valACYclovir (VALTREX) 500 MG tablet Take 1 tablet (500 mg) by mouth 2 times daily 180 tablet 1       Problem List:  Patient Active Problem List    Diagnosis Date Noted     Chronic pain 08/04/2015     Priority: High     Patient is followed by Data Unavailable for ongoing prescription of pain medication.  All refills should be approved by this provider, or covering  partner.    Medication(s):. Oxycodone 5 mg   Maximum quantity per month: 60  Clinic visit frequency required: Q 3 months     Controlled substance agreement on file: Yes       Date(s): 5/2014    Pain Clinic evaluation in the past: Yes       Date/Location:  7/8/15 [facility] FERNANDES Total Score(s):  No flowsheet data found.    Last [facility] website verification: 5/8/2020   [email or web address]        Rheumatoid arthritis involving right hand with positive rheumatoid factor (H) 06/01/2009     Priority: High     Patient is followed by [facility] for ongoing prescription of narcotic pain medicine.  Med: oxycodone.   Maximum use per month: 90  Expected duration: lifelong  Narcotic agreement on file: YES  Clinic visit recommended: Q 3 months         Anxiety 06/20/2017     Priority: Medium     Hyperglycemia 04/29/2016     Priority: Medium     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 01/04/2016     Priority: Medium     SHERRY (obstructive sleep apnea)-severe (AHI 35) 01/24/2014     Priority: Medium     Polysomnography 1/20/2014: (214.0 lbs). The lowest oxygen saturation was 88.0%. Apnea/Hypopnea Index was 35.4 events per hour.  The REM AHI was N/A.  The RERA index was 19.7 per hour.   The RDI was 55.1. On CPAP optimal pressure was 9 with an AHI of 0.5 including lateral REM sleep. During the diagnostic portion of the study, PLM index was 99.0 per hour.  During the treatment portion of the study, PLM index was 46.6 per hour.        Anemia of chronic disease 05/17/2013     Priority: Medium     Hyperlipidemia LDL goal <100 07/23/2011     Priority: Medium     Chronic right shoulder pain 09/16/2020     Priority: Low     DDD (degenerative disc disease), lumbar 07/16/2019     Priority: Low     Low iron 06/22/2018     Priority: Low     History of depression 06/22/2018     Priority: Low     Allergic state, subsequent encounter 03/01/2017     Priority: Low     Chronic bilateral low back pain without sciatica 07/17/2016      Priority: Low     Iron malabsorption 04/27/2016     Priority: Low     Personal history of healed osteoporosis fracture 01/26/2016     Priority: Low     MGD (meibomian gland dysfunction) 01/19/2016     Priority: Low     Blepharitis of both eyes 01/19/2016     Priority: Low     Class 1 obesity due to excess calories with serious comorbidity and body mass index (BMI) of 31.0 to 31.9 in adult 08/28/2015     Priority: Low              Stenosis, spinal, lumbar 07/07/2015     Priority: Low     Cornea guttata, ou 05/28/2015     Priority: Low     Conjunctival concretions 05/28/2015     Priority: Low     Neuropathy 07/24/2013     Priority: Low     Diagnosed based on symptoms in July 24, 2013  Increased cymbalta to 60 mg daily.  No emg done to date       zEncounter for counseling 12/07/2012     Priority: Low     Overview:   Patient has identified Health Care Agent(s): Yes  Add Health Care Agents: Yes    Health Care Agent(s):  Primary Health Care Agent: Jamila Relationship: daughter Phone: [phone]   Secondary Health Care Agent:  Relationship:  Phone:    Conservator:  Relationship:  Phone:    Guardian: Relationship:  Phone:      Patient has Advance Care Plan Documents (Health Care Directive, POLST): Yes    Advance Care Plan Documents:  Health Care Directive    Patient has identified Specific Treatment Preferences: Yes   Specific Treatment Preferences: a.) Code Status:  CPR/Attempt Resuscitation        Advance Care Planning 05/15/2012     Priority: Low     Advance Care Planning 7/7/2016: Receipt of ACP document:  Received: POLST which was signed and dated by provider on 4-12-16.  Document previously scanned on 4-18-16.  Has a previous POLST dated 9-30-16 and a previous Resuscitation Guidelines dated 4-7-16. Orders reviewed and found to be valid.  Code Status needs to be updated to reflect choices in most recent ACP document. Orders are DNI only.  Confirmed/documented designated decision maker(s).  Added by Bailey Morales  RN Advance Care Planning Liaison with [facility]  Advance Care Planning 7/7/2016: Receipt of ACP document:  Received: Health Care Directive which was witnessed or notarized on 4-25-16.  Document previously scanned on 5-20-16.  Validation form completed and sent to be scanned.  Code Status needs to be updated to reflect choices in most recent ACP document.  Confirmed/documented designated decision maker(s).  Added by Bailey Morales  Advance Care Planning 5/15/2012: Patient states has Advance Directive and will bring in a copy to clinic.            GERD (gastroesophageal reflux disease) 04/12/2011     Priority: Low     nexium worked but insurance would not pay for it.  Was changed to omeprazole and has breakthrough reflux on 40 mg daily.       PVD (POSTERIOR VITREOUS DETACHMENT) OU 01/12/2011     Priority: Low     PXF (PSEUDOEXFOLIATION OF LENS CAPSULE) OD 01/12/2011     Priority: Low     PSEUDOPHAKIA OU 01/07/2011     Priority: Low     Pulmonary nodule 01/04/2011     Priority: Low     Ct needed in 10/11       CARDIOVASCULAR SCREENING; LDL GOAL LESS THAN 130 10/31/2010     Priority: Low     Multinodular goiter      Priority: Low     Ultrasound in 10/10       History of colonic polyps      Priority: Low     tubular adenoma         Osteoporosis 02/27/2008     Priority: Low        Past Medical/Surgical History:  Past Medical History:   Diagnosis Date     Acute posthemorrhagic anemia 10/13/2012     Closed fracture of multiple ribs of left side, initial encounter 11/25/2019     Ex-smoker 01/1999     Gastroenteritis 03/21/2020    Gastroenteritis with norovirus     Herniated nucleus pulposus, L3-4 3/1/2017     Hip joint replacement by other means 07/10/2008     History of blood transfusion      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     History of total knee replacement 07/23/2009     Menopause 1989    late 40's     Migraine 04/27/2014    resolved     Other chronic pain     joints     Pelvic fracture (H)  2014     Rheumatoid arteritis (H)      S/P lumbar fusion 2017     Sleep apnea      Vitamin B12 deficiency      Past Surgical History:   Procedure Laterality Date     ARTHROSCOPY KNEE RT/LT  2006    left     BACK SURGERY      disc     BREAST SURGERY      lumpectomy 90's     C ANESTH,TOTAL HIP ARTHROPLASTY      Rt hip     C HAND/FINGER SURGERY UNLISTED  2005    right hand     C TOTAL KNEE ARTHROPLASTY      left     CATARACT IOL, RT/LT Bilateral 2010 aproximately      SECTION  1966      SECTION  1972     COLONOSCOPY  2009,     HC ESOPH/GAS REFLUX TEST W NASAL IMPED >1 HR  2012    Procedure:ESOPHAGEAL IMPEDENCE FUNCTION TEST WITH 24 HOUR PH GREATER THAN 1 HOUR; Surgeon:KAYKAY JULIO; Location:[facility]     IR TRANSLAMINAR EPIDURAL LUMBAR INJ INCL IMAGING  2012    LESI L5-S1 at [facility]     OPTICAL TRACKING SYSTEM FUSION POSTERIOR SPINE LUMBAR N/A 2017    Procedure: OPTICAL TRACKING SYSTEM FUSION SPINE POSTERIOR LUMBAR ONE LEVEL;  Surgeon: Anthony Michele MD;  Location: [facility]     ORTHOPEDIC SURGERY      left foot surgery     ORTHOPEDIC SURGERY      R MCP surgery     ORTHOPEDIC SURGERY      right knee total replacement       Social History:  Social History     Socioeconomic History     Marital status: Single     Spouse name: Not on file     Number of children: 3     Years of education: Not on file     Highest education level: Not on file   Occupational History     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   Social Needs     Financial resource strain: Not on file     Food insecurity     Worry: Not on file     Inability: Not on file     Transportation needs     Medical: Not on file     Non-medical: Not on file   Tobacco Use     Smoking status: Former Smoker     Packs/day: 1.00     Years: 41.00     Pack years: 41.00     Types: Cigarettes     Quit date: 1999     Years since quittin.9      Smokeless tobacco: Never Used     Tobacco comment: former smoker   Substance and Sexual Activity     Alcohol use: Never     Alcohol/week: 0.0 standard drinks     Frequency: Never     Drug use: No     Sexual activity: Never     Partners: Male   Lifestyle     Physical activity     Days per week: Not on file     Minutes per session: Not on file     Stress: Not on file   Relationships     Social connections     Talks on phone: Not on file     Gets together: Not on file     Attends Pentecostalism service: Not on file     Active member of club or organization: Not on file     Attends meetings of clubs or organizations: Not on file     Relationship status: Not on file     Intimate partner violence     Fear of current or ex partner: Not on file     Emotionally abused: Not on file     Physically abused: Not on file     Forced sexual activity: Not on file   Other Topics Concern     Parent/sibling w/ CABG, MI or angioplasty before 65F 55M? No      Service Not Asked     Blood Transfusions Not Asked     Caffeine Concern Yes     Comment: She has 8 cups of coffee in the AM and is done by 8-8:30 AM.     Occupational Exposure Not Asked     Hobby Hazards Not Asked     Sleep Concern Not Asked     Stress Concern Not Asked     Weight Concern Not Asked     Special Diet Not Asked     Back Care Not Asked     Exercise Yes     Comment: Sometimes.  Gordo Redd comes 3 times a week to her building.     Bike Helmet Not Asked     Seat Belt Not Asked     Self-Exams Not Asked   Social History Narrative    She lives in a a senior living apartment building.       Family History:  Family History   Problem Relation Age of Onset     Arthritis Mother      Alzheimer Disease Mother      Hyperlipidemia Mother      Osteoporosis Mother      Heart Disease Father         MI ( from this)     Alcohol/Drug Father      Arthritis Sister      Hypertension Sister      Cancer Son      Diabetes Son      Neurologic Disorder Sister         Schizophrenic      Hypertension Sister      Hyperlipidemia Sister      Mental Illness Sister      Diabetes Son      Other Cancer Son      Glaucoma Daughter        Review of Systems:  A complete review of systems reviewed by me is negative with the exeption of what has been mentioned in the history of present illness.  CONSTITUTIONAL: NEGATIVE for weight gain/loss, fever, chills, sweats or night sweats, drug allergies.  EYES: NEGATIVE for changes in vision, blind spots, double vision.  ENT:  NEGATIVE for  sore throat  CARDIAC: NEGATIVE for fast heartbeats or fluttering in chest, chest pain or pressure, breathlessness when lying flat, swollen legs or swollen feet.  NEUROLOGIC: NEGATIVE headaches, weakness or numbness in the arms or legs.  DERMATOLOGIC: NEGATIVE for rashes, new moles or change in mole(s)  PULMONARY:  POSITIVE for  dry cough and productive cough  GASTROINTESTINAL: NEGATIVE for nausea or vomitting, loose or watery stools, fat or grease in stools, constipation, abdominal pain, bowel movements black in color or blood noted.  GENITOURINARY: NEGATIVE for pain during urination, blood in urine, urinating more frequently than usual, irregular menstrual periods.  MUSCULOSKELETAL: NEGATIVE for muscle pain, bone or joint pain, swollen joints.  ENDOCRINE: NEGATIVE for increased thirst or urination, diabetes.  LYMPHATIC: NEGATIVE for swollen lymph nodes, lumps or bumps in the breasts or nipple discharge.    Physical Examination:  Vitals: There were no vitals taken for this visit.  BMI= There is no height or weight on file to calculate BMI.    Wt Readings from Last 4 Encounters:   11/27/20 89.4 kg (197 lb)   06/16/20 88.4 kg (194 lb 12.8 oz)   04/27/20 89.5 kg (197 lb 6.4 oz)   03/27/20 89.8 kg (198 lb)            [place] Total Score 12/9/2020   Total score - [place] 11       MAY Total Score: 9 (12/09/20 1000)    SpO2 Readings from Last 4 Encounters:   11/27/20 96%   06/16/20 95%   04/27/20 94%   03/27/20 94%       GENERAL APPEARANCE:  alert and no distress  EYES: Eyes grossly normal to inspection  HENT: mouth without ulcers or lesions  NECK: not generous size  LUNGS: no shortness of breath , cough  NEURO: mentation intact, speech normal and cranial nerves 2-12 appear intact  PSYCH: affect normal/bright      Impression/Plan:    Severe obstructive sleep apnea by sleep study 2014. Weight loss of 15# since then. Not using CPAP due to 'waking her because its loud'. Current symptoms of excessive daytime sleepiness (ESS 11). Comorbid chronic pain. Given age, lack of hypoxemia on sleep study, lack of comorbidities outside of insomnia and chronic pain, it would be reasonable to not treat her but she is motivated because of benefit in sleepiness  Options discussed  - Elect retstart CPAP 8-15 cmH20  - Needs reeducation on restarting CPAP  -[facility] referral     Insomnia  - resolved    Anton Louie MD   I spent 45 minutes with patient. More than 1/2 this time spent counseling on above issues     CC: Georgia Vázquez

## 2020-12-10 ENCOUNTER — DOCUMENTATION ONLY (OUTPATIENT)
Dept: SLEEP MEDICINE | Facility: CLINIC | Age: 80
End: 2020-12-10

## 2020-12-10 ENCOUNTER — VIRTUAL VISIT (OUTPATIENT)
Dept: SLEEP MEDICINE | Facility: CLINIC | Age: 80
End: 2020-12-10
Attending: NURSE PRACTITIONER
Payer: COMMERCIAL

## 2020-12-10 ENCOUNTER — TELEPHONE (OUTPATIENT)
Dept: SLEEP MEDICINE | Facility: CLINIC | Age: 80
End: 2020-12-10

## 2020-12-10 VITALS — WEIGHT: 195 LBS | HEIGHT: 66 IN | BODY MASS INDEX: 31.34 KG/M2

## 2020-12-10 DIAGNOSIS — G89.29 OTHER CHRONIC PAIN: ICD-10-CM

## 2020-12-10 DIAGNOSIS — F51.04 PSYCHOPHYSIOLOGIC INSOMNIA: ICD-10-CM

## 2020-12-10 DIAGNOSIS — E66.09 CLASS 1 OBESITY DUE TO EXCESS CALORIES WITH SERIOUS COMORBIDITY AND BODY MASS INDEX (BMI) OF 31.0 TO 31.9 IN ADULT: ICD-10-CM

## 2020-12-10 DIAGNOSIS — E66.811 CLASS 1 OBESITY DUE TO EXCESS CALORIES WITH SERIOUS COMORBIDITY AND BODY MASS INDEX (BMI) OF 31.0 TO 31.9 IN ADULT: ICD-10-CM

## 2020-12-10 DIAGNOSIS — G47.33 OSA (OBSTRUCTIVE SLEEP APNEA): ICD-10-CM

## 2020-12-10 DIAGNOSIS — G47.33 OSA (OBSTRUCTIVE SLEEP APNEA): Primary | ICD-10-CM

## 2020-12-10 PROBLEM — M51.26 HERNIATED NUCLEUS PULPOSUS, L3-4: Status: RESOLVED | Noted: 2017-03-01 | Resolved: 2020-12-10

## 2020-12-10 PROBLEM — M25.511 CHRONIC RIGHT SHOULDER PAIN: Chronic | Status: ACTIVE | Noted: 2020-09-16

## 2020-12-10 PROBLEM — F41.9 ANXIETY: Chronic | Status: ACTIVE | Noted: 2017-06-20

## 2020-12-10 PROCEDURE — 99204 OFFICE O/P NEW MOD 45 MIN: CPT | Mod: 95 | Performed by: INTERNAL MEDICINE

## 2020-12-10 NOTE — TELEPHONE ENCOUNTER
Left patient a voicemail to return call if she would like to get replacement CPAP from [facility] since she last got supplies in 2017.

## 2020-12-14 ENCOUNTER — DOCUMENTATION ONLY (OUTPATIENT)
Dept: SLEEP MEDICINE | Facility: CLINIC | Age: 80
End: 2020-12-14

## 2020-12-14 DIAGNOSIS — G47.33 OSA (OBSTRUCTIVE SLEEP APNEA): ICD-10-CM

## 2020-12-14 DIAGNOSIS — E66.09 CLASS 1 OBESITY DUE TO EXCESS CALORIES WITH SERIOUS COMORBIDITY AND BODY MASS INDEX (BMI) OF 31.0 TO 31.9 IN ADULT: ICD-10-CM

## 2020-12-14 DIAGNOSIS — E66.811 CLASS 1 OBESITY DUE TO EXCESS CALORIES WITH SERIOUS COMORBIDITY AND BODY MASS INDEX (BMI) OF 31.0 TO 31.9 IN ADULT: ICD-10-CM

## 2020-12-14 DIAGNOSIS — G89.29 OTHER CHRONIC PAIN: ICD-10-CM

## 2020-12-14 NOTE — PROGRESS NOTES
3 DAY STM VISIT        Patient contacted for 3 day STM visit    Confirmed with patient at time of call- N/A Patient is still interested in STM service     Data only recheck recheck at 14 day pt to get a new device.  STM entry per provider     Replacement device: Yes  STM ordered by provider: Yes     Device type: Auto-CPAP  PAP settings from order::  CPAP min 8 cm  H20       CPAP max 15 cm  H20       Mask type:    Nasal Mask     Device settings from machine       Assessment:  Pt getting new machine.   Action plan: Patient to have 14 day STM visit.     Total time spent on accessing and  interpreting remote patient PAP therapy data  0 minutes  Total time spent counseling, coaching  and reviewing PAP therapy data with patient  0 minutes  [id number] no

## 2020-12-17 ENCOUNTER — DOCUMENTATION ONLY (OUTPATIENT)
Dept: SLEEP MEDICINE | Facility: CLINIC | Age: 80
End: 2020-12-17

## 2020-12-17 DIAGNOSIS — E66.09 CLASS 1 OBESITY DUE TO EXCESS CALORIES WITH SERIOUS COMORBIDITY AND BODY MASS INDEX (BMI) OF 31.0 TO 31.9 IN ADULT: ICD-10-CM

## 2020-12-17 DIAGNOSIS — E66.811 CLASS 1 OBESITY DUE TO EXCESS CALORIES WITH SERIOUS COMORBIDITY AND BODY MASS INDEX (BMI) OF 31.0 TO 31.9 IN ADULT: ICD-10-CM

## 2020-12-17 DIAGNOSIS — G89.29 OTHER CHRONIC PAIN: ICD-10-CM

## 2020-12-17 DIAGNOSIS — G47.33 OSA (OBSTRUCTIVE SLEEP APNEA): ICD-10-CM

## 2020-12-17 NOTE — PROGRESS NOTES
Patient was offered choice of vendor and chose [facility].  Patient Ginger Marshall was set up at [facility]  on December 17, 2020. Patient received a Resmed AirSense 10 Auto. Pressures were set at 8-15 cm H2O.   Patient s ramp is 8 cm H2O for Off and FLEX/EPR is EPR, 1.  Patient received a Resmed Mask name: Airfit   Nasal mask size Medium, heated tubing and heated humidifier.  Patient does need to meet compliance. Patient has a follow up on TBD with Dr. Catherine Cox Her

## 2020-12-18 ENCOUNTER — ANCILLARY PROCEDURE (OUTPATIENT)
Dept: MAMMOGRAPHY | Facility: CLINIC | Age: 80
End: 2020-12-18
Attending: NURSE PRACTITIONER
Payer: COMMERCIAL

## 2020-12-18 DIAGNOSIS — M81.0 OSTEOPOROSIS WITHOUT CURRENT PATHOLOGICAL FRACTURE, UNSPECIFIED OSTEOPOROSIS TYPE: ICD-10-CM

## 2020-12-18 DIAGNOSIS — Z12.31 ENCOUNTER FOR SCREENING MAMMOGRAM FOR BREAST CANCER: ICD-10-CM

## 2020-12-18 LAB
CALCIUM SERPL-MCNC: 9.6 MG/DL (ref 8.5–10.1)
PTH-INTACT SERPL-MCNC: 42 PG/ML (ref 18–80)

## 2020-12-18 PROCEDURE — 36415 COLL VENOUS BLD VENIPUNCTURE: CPT | Performed by: INTERNAL MEDICINE

## 2020-12-18 PROCEDURE — 82306 VITAMIN D 25 HYDROXY: CPT | Performed by: INTERNAL MEDICINE

## 2020-12-18 PROCEDURE — 83970 ASSAY OF PARATHORMONE: CPT | Performed by: INTERNAL MEDICINE

## 2020-12-18 PROCEDURE — 77067 SCR MAMMO BI INCL CAD: CPT | Mod: TC | Performed by: RADIOLOGY

## 2020-12-18 PROCEDURE — 82310 ASSAY OF CALCIUM: CPT | Performed by: INTERNAL MEDICINE

## 2020-12-18 RX ORDER — TOFACITINIB 11 MG/1
11 TABLET, FILM COATED, EXTENDED RELEASE ORAL DAILY
Qty: 90 TABLET | Refills: 2 | Status: SHIPPED | OUTPATIENT
Start: 2020-12-18 | End: 2021-06-18

## 2020-12-18 RX ORDER — METHOTREXATE 25 MG/.5ML
25 INJECTION, SOLUTION SUBCUTANEOUS
Qty: 2 ML | Refills: 4 | Status: SHIPPED | OUTPATIENT
Start: 2020-12-18 | End: 2021-03-05

## 2020-12-18 NOTE — RESULT ENCOUNTER NOTE
Dear Ginger,    Your recent test results are attached.      Normal mammogram.    If you have any questions please feel free to contact [phone] or myself via [employer].    Sincerely,  Dahiana Flores, CNP

## 2020-12-19 LAB — DEPRECATED CALCIDIOL+CALCIFEROL SERPL-MC: 48 UG/L (ref 20–75)

## 2020-12-21 ENCOUNTER — DOCUMENTATION ONLY (OUTPATIENT)
Dept: SLEEP MEDICINE | Facility: CLINIC | Age: 80
End: 2020-12-21

## 2020-12-21 DIAGNOSIS — G47.33 OSA (OBSTRUCTIVE SLEEP APNEA): ICD-10-CM

## 2020-12-21 DIAGNOSIS — E66.09 CLASS 1 OBESITY DUE TO EXCESS CALORIES WITH SERIOUS COMORBIDITY AND BODY MASS INDEX (BMI) OF 31.0 TO 31.9 IN ADULT: ICD-10-CM

## 2020-12-21 DIAGNOSIS — G89.29 OTHER CHRONIC PAIN: ICD-10-CM

## 2020-12-21 DIAGNOSIS — E66.811 CLASS 1 OBESITY DUE TO EXCESS CALORIES WITH SERIOUS COMORBIDITY AND BODY MASS INDEX (BMI) OF 31.0 TO 31.9 IN ADULT: ICD-10-CM

## 2020-12-21 NOTE — PROGRESS NOTES
3 DAY STM VISIT      Patient contacted for 3 day STM visit    Confirmed with patient at time of call- N/A Patient is still interested in STM service     Subjective measures:  Patient reports that she has been struggling with the machine for the first few days.      Replacement device: Yes  STM ordered by provider: Yes     Device type: Auto-CPAP  PAP settings from order::  CPAP min 8 cm  H20       CPAP max 15 cm  H20         Mask type:    Nasal Mask                 Assessment: Nightly usage over four hours per patient.    Action plan: Patient to have 14 day STM visit. Patient has a follow up visit scheduled:   no, but is required by insurance for compliance.     Total time spent on accessing and  interpreting remote patient PAP therapy data   0 minutes  Total time spent counseling, coaching  and reviewing PAP therapy data with patient  4 minutes  [id number] no

## 2021-01-05 ENCOUNTER — DOCUMENTATION ONLY (OUTPATIENT)
Dept: SLEEP MEDICINE | Facility: CLINIC | Age: 81
End: 2021-01-05

## 2021-01-05 DIAGNOSIS — G89.29 OTHER CHRONIC PAIN: ICD-10-CM

## 2021-01-05 DIAGNOSIS — G47.33 OSA (OBSTRUCTIVE SLEEP APNEA): ICD-10-CM

## 2021-01-05 DIAGNOSIS — E66.09 CLASS 1 OBESITY DUE TO EXCESS CALORIES WITH SERIOUS COMORBIDITY AND BODY MASS INDEX (BMI) OF 31.0 TO 31.9 IN ADULT: ICD-10-CM

## 2021-01-05 DIAGNOSIS — E66.811 CLASS 1 OBESITY DUE TO EXCESS CALORIES WITH SERIOUS COMORBIDITY AND BODY MASS INDEX (BMI) OF 31.0 TO 31.9 IN ADULT: ICD-10-CM

## 2021-01-05 NOTE — PROGRESS NOTES
14  DAY STM VISIT    Diagnostic AHI: 35.5    PSG    Subjective measures:   Pt reports things are improving slowly.  She is waking up to too much pressure during the night.     Assessment: Pt not meeting objective benchmarks for leak Patient failing following subjective benchmarks: pressure issues    Action plan: pt to have 30 day STM visit.  Change device to soft response for comfort.     Device type: Auto-CPAP    PAP settings: CPAP min 8.0 cm  H20       CPAP max 15.0 cm  H20          95th% pressure 12.1 cm  H20        RESMED EPR level Setting: ONE    RESMED Soft response setting:  OFF    Mask type:  Nasal Mask    Objective measures: 14 day rolling measures      Compliance  78 %      Leak  30.51  lpm  last  upload      AHI 4.54   last  upload      Average number of minutes 401      Objective measure goal  Compliance   Goal >70%  Leak   Goal < 24 lpm  AHI  Goal < 5  Usage  Goal >240        Total time spent on accessing and interpreting remote patient PAP therapy data  10 minutes    Total time spent counseling, coaching  and reviewing PAP therapy data with patient  3 minutes    [id number]  [id number]  no (3 day STM)

## 2021-01-11 ENCOUNTER — MYC MEDICAL ADVICE (OUTPATIENT)
Dept: INTERNAL MEDICINE | Facility: CLINIC | Age: 81
End: 2021-01-11

## 2021-01-11 DIAGNOSIS — F41.9 ANXIETY: ICD-10-CM

## 2021-01-11 DIAGNOSIS — G89.29 CHRONIC MIDLINE THORACIC BACK PAIN: Primary | ICD-10-CM

## 2021-01-11 DIAGNOSIS — F33.1 MODERATE EPISODE OF RECURRENT MAJOR DEPRESSIVE DISORDER (H): ICD-10-CM

## 2021-01-11 DIAGNOSIS — M54.6 CHRONIC MIDLINE THORACIC BACK PAIN: Primary | ICD-10-CM

## 2021-01-11 DIAGNOSIS — K21.9 GASTROESOPHAGEAL REFLUX DISEASE WITHOUT ESOPHAGITIS: ICD-10-CM

## 2021-01-11 RX ORDER — TIZANIDINE 2 MG/1
1-2 TABLET ORAL 3 TIMES DAILY PRN
Qty: 30 TABLET | Refills: 0 | Status: SHIPPED | OUTPATIENT
Start: 2021-01-11 | End: 2021-06-22

## 2021-01-11 RX ORDER — DESVENLAFAXINE 100 MG/1
TABLET, EXTENDED RELEASE ORAL
Qty: 90 TABLET | Refills: 1 | Status: SHIPPED | OUTPATIENT
Start: 2021-01-11 | End: 2021-07-05

## 2021-01-13 RX ORDER — PANTOPRAZOLE SODIUM 40 MG/1
TABLET, DELAYED RELEASE ORAL
Qty: 90 TABLET | Refills: 1 | Status: SHIPPED | OUTPATIENT
Start: 2021-01-13 | End: 2021-07-05

## 2021-01-13 NOTE — TELEPHONE ENCOUNTER
Pres[place] [facility] [place]  Carmen Cheek RN

## 2021-01-27 ENCOUNTER — DOCUMENTATION ONLY (OUTPATIENT)
Dept: SLEEP MEDICINE | Facility: CLINIC | Age: 81
End: 2021-01-27
Payer: COMMERCIAL

## 2021-01-27 DIAGNOSIS — E66.811 CLASS 1 OBESITY DUE TO EXCESS CALORIES WITH SERIOUS COMORBIDITY AND BODY MASS INDEX (BMI) OF 31.0 TO 31.9 IN ADULT: ICD-10-CM

## 2021-01-27 DIAGNOSIS — E66.09 CLASS 1 OBESITY DUE TO EXCESS CALORIES WITH SERIOUS COMORBIDITY AND BODY MASS INDEX (BMI) OF 31.0 TO 31.9 IN ADULT: ICD-10-CM

## 2021-01-27 DIAGNOSIS — G47.33 OSA (OBSTRUCTIVE SLEEP APNEA): ICD-10-CM

## 2021-01-27 DIAGNOSIS — G89.29 OTHER CHRONIC PAIN: ICD-10-CM

## 2021-01-27 NOTE — PROGRESS NOTES
30 DAY [facility] VISIT    Diagnostic AHI: 35.5     PSG    Data only recheck     Assessment: Pt meeting objective benchmarks.     Action plan:   Patient has scheduled a follow up visit  Device type: Auto-CPAP  PAP settings: CPAP min 8.0 cm  H20     CPAP max 15.0 cm  H20         95th% pressure 11.3 cm  H20      RESMED EPR level Setting: ONE    RESMED Soft response setting:  ON  Mask type:  Nasal Mask  Objective measures: 14 day rolling measures      Compliance  92 %      Leak  30.96 lpm  last  upload      AHI 4.03   last  upload      Average number of minutes 435      Objective measure goal  Compliance   Goal >70%  Leak   Goal < 24 lpm  AHI  Goal < 5  Usage  Goal >240        Total time spent on accessing and interpreting remote patient PAP therapy data  10 minutes    Total time spent counseling, coaching  and reviewing PAP therapy data with patient  0 minutes     [id number] this call  [id number] no  at 3 or 14 day [facility]

## 2021-02-16 DIAGNOSIS — Z79.899 HIGH RISK MEDICATION USE: ICD-10-CM

## 2021-02-16 DIAGNOSIS — M05.79 RHEUMATOID ARTHRITIS INVOLVING MULTIPLE SITES WITH POSITIVE RHEUMATOID FACTOR (H): ICD-10-CM

## 2021-02-16 LAB
ALBUMIN SERPL-MCNC: 4.2 G/DL (ref 3.4–5)
ALP SERPL-CCNC: 44 U/L (ref 40–150)
ALT SERPL W P-5'-P-CCNC: 34 U/L (ref 0–50)
AST SERPL W P-5'-P-CCNC: 21 U/L (ref 0–45)
BASOPHILS # BLD AUTO: 0 10E9/L (ref 0–0.2)
BASOPHILS NFR BLD AUTO: 0.2 %
BILIRUB DIRECT SERPL-MCNC: 0.1 MG/DL (ref 0–0.2)
BILIRUB SERPL-MCNC: 0.4 MG/DL (ref 0.2–1.3)
CREAT SERPL-MCNC: 0.62 MG/DL (ref 0.52–1.04)
CRP SERPL-MCNC: <2.9 MG/L (ref 0–8)
DIFFERENTIAL METHOD BLD: ABNORMAL
EOSINOPHIL # BLD AUTO: 0.3 10E9/L (ref 0–0.7)
EOSINOPHIL NFR BLD AUTO: 4.9 %
ERYTHROCYTE [DISTWIDTH] IN BLOOD BY AUTOMATED COUNT: 15.2 % (ref 10–15)
ERYTHROCYTE [SEDIMENTATION RATE] IN BLOOD BY WESTERGREN METHOD: 20 MM/H (ref 0–30)
GFR SERPL CREATININE-BSD FRML MDRD: 86 ML/MIN/{1.73_M2}
HCT VFR BLD AUTO: 38.9 % (ref 35–47)
HGB BLD-MCNC: 12.5 G/DL (ref 11.7–15.7)
LYMPHOCYTES # BLD AUTO: 1 10E9/L (ref 0.8–5.3)
LYMPHOCYTES NFR BLD AUTO: 17 %
MCH RBC QN AUTO: 32.8 PG (ref 26.5–33)
MCHC RBC AUTO-ENTMCNC: 32.1 G/DL (ref 31.5–36.5)
MCV RBC AUTO: 102 FL (ref 78–100)
MONOCYTES # BLD AUTO: 0.7 10E9/L (ref 0–1.3)
MONOCYTES NFR BLD AUTO: 12.1 %
NEUTROPHILS # BLD AUTO: 3.8 10E9/L (ref 1.6–8.3)
NEUTROPHILS NFR BLD AUTO: 65.8 %
PLATELET # BLD AUTO: 335 10E9/L (ref 150–450)
PROT SERPL-MCNC: 8 G/DL (ref 6.8–8.8)
RBC # BLD AUTO: 3.81 10E12/L (ref 3.8–5.2)
WBC # BLD AUTO: 5.7 10E9/L (ref 4–11)

## 2021-02-16 PROCEDURE — 36415 COLL VENOUS BLD VENIPUNCTURE: CPT | Performed by: INTERNAL MEDICINE

## 2021-02-16 PROCEDURE — 82565 ASSAY OF CREATININE: CPT | Performed by: INTERNAL MEDICINE

## 2021-02-16 PROCEDURE — 86140 C-REACTIVE PROTEIN: CPT | Performed by: INTERNAL MEDICINE

## 2021-02-16 PROCEDURE — 85652 RBC SED RATE AUTOMATED: CPT | Performed by: INTERNAL MEDICINE

## 2021-02-16 PROCEDURE — 85025 COMPLETE CBC W/AUTO DIFF WBC: CPT | Performed by: INTERNAL MEDICINE

## 2021-02-16 PROCEDURE — 80076 HEPATIC FUNCTION PANEL: CPT | Performed by: INTERNAL MEDICINE

## 2021-03-04 NOTE — PROGRESS NOTES
"Ginger Marshall  is a 80 year old year old female who is being evaluated via a billable video visit.      How would you like to obtain your AVS? MyChart  If the video visit is dropped, the invitation should be resent by: Text to cell phone: [phone]  Will anyone else be joining your video visit? No     Rheumatology Video Visit      Ginger Marshall MRN# [id number]   YOB: 1940 Age: 80 year old      Date of visit: 3/05/21   PCP: Georgia Vázquez MD     Chief Complaint   Patient presents with:  Arthritis: RA    Assessment and Plan     1. Seropositive ( [2009], CCP >100 [2009]; hx of rheumatoid nodule by 6/14/2011 pathology) Erosive Rheumatoid Arthritis: Previously failed remicade (staph infection during therapy, but was immediately after a joint injection), orencia (migraines), HCQ (ineffective).  Sulfa allergy.  Currently on methotrexate 25 mg SQ once weekly (dose reduction in the past resulted in more symptoms), folic acid 800 mcg daily, Xeljanz XR 11mg daily.  She also has prednisone 20 mg daily ×5 days to use as needed for flare.  RA appears well controlled at this time.  But chronic nasal sore not clearly related to MTX; will add leucovorin; if no improvement then to see ENT.  Chronic illness  - Continue methotrexate from 25mg SQ once weekly  - Continue folic acid from 800mcg daily to 1600mcg daily  - Start leucovorin 5 mg every 7 days, 24 hours after MTX dose.   - Continue Xeljanz XR 11mg daily  - Labs every 12 weeks: CBC, Creatinine, Hepatic Panel, ESR, CRP; I advised that she call to schedule the lab appointment    High risk medication requiring intensive toxicity monitoring at least quarterly: labs ordered include CBC, Creatinine, Hepatic panel to monitor for cytopenia and hepatotoxicity; checking creatinine as it affects clearance of medication.                Rapid 3, cumulative scores                       9/11/2020: \"RA is doing well\" (MTX 25mg SQ wkly, Xeljanz XR 11mg daily)                  "     10/31/2019: 3.8 (MTX 25mg PO wkly, Xeljanz XR 11mg daily)                        07/11/2019: 8.2 (MTX 25mg PO wkly, HCQ 400mg daily, Xeljanz XR 11mg daily)                        03/06/2019: ?    (MTX 25mg PO wkly, HCQ 400mg daily, Xeljanz XR 11mg daily)                       12/12/2018: 7.5 (MTX 25mg wkly, HCQ 400mg daily, Xeljanz XR 11mg daily)                       08/08/2018: 7    (MTX 22.5mg wkly, Xeljanz XR 11mg daily)                       02/07/2018: 4    (MTX 22.5mg wkly, Xeljanz XR 11mg daily)                       11/08/2017: 5    (MTX 22.5mg wkly, Xeljanz XR 11mg daily)                       08/09/2017: 0    (MTX 22.5mg wkly, Xeljanz XR 11mg daily)                      05/10/2017: 5    (MTX 25mg wkly, Xeljanz XR 11mg daily) RA doing well      2. 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 Refer to podiatry.  - podiatry referral    3. Right hip pain: Status post WALTER twice in the past. Followed by [facility].     4. 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 Michele who is now at [facility].  Advised that she restart physical therapy as physical therapy was helpful until it was stopped due to COVID-19 pandemic; she denies doing exercises at home. Advised that she follow-up with her surgeon as well because of her continued pain.     5. Osteoporosis: was previously managed by endocrinology and she received reclast once in 2016. 12/12/2018 DEXA ordered by Dr. Vázquez showed osteoporosis.  Again discussed the treatment options for osteoporosis, risks associated with the treatment, and the risk associated with not treating.  I recommended that she treat her osteoporosis but she has refused.  She verbalized understanding about the risks of not treating osteoporosis, and commented that Dr. Vázquez has told her the  same thing.  Check labs and if okay then will restart Reclast.  Note that request was received in 2013, 2014, and 2016.  - Start reclast IV if labs are okay  - Continue calcium and vitamin D                  6. Left 1st toe pain, history: 2 episodes of sudden onset left toe pain followed by diffuse left foot pain that made ambulation difficult.  Also with nodules over both Achilles tendons and the right elbow that are either rheumatoid nodules or tophi.  She reports having history of gout decades ago.  Denies ever being on colchicine or allopurinol.  Discussed colchicine to use if suspected gout flare occurs.  No flares since last seen so has not used colchicine.  - Colchicine: (MITIGARE) 0.6 MG capsule; Take 2 capsules (1.2 mg) by mouth once for 1 dose at the onset of a gout flare, followed by 1 capsule (0.6mg) 1 hour later.        # s/p 2 doses of the Pfizer COVID19 vaccine    # This is a virtual visit to reduce the risk of COVID-19 exposure during this current pandemic.      # Considered to be at high risk of complications from the COVID-19 virus.  It is recommended to limit contact with other people and if possible to work remotely or provide a leave of absence to reduce the risk for COVID-19.      Total minutes spent in evaluation with patient, documentation, , and review of pertinent studies and chart notes: 33     Ms. Marshall verbalized agreement with and understanding of the rational for the diagnosis and treatment plan.  All questions were answered to best of my ability and the patient's satisfaction. Ms. Marshall was advised to contact the clinic with any questions that may arise after the clinic visit.      Thank you for involving me in the care of the patient    Return to clinic: 3-4 months    HPI   Ginger Marshall is a 80 year old female with a history of multiple foot surgeries, hand tendon transfers, MCP replacements (most recent being in August 2015), bilateral TKA, right WALTER, left distal  radius fracture history, gout, s/p lumbar fusion, osteoporosis and seropositive (RF+, CCP+) erosive rheumatoid arthritis who presents for follow-up of rheumatoid arthritis.      Currently doing well with regard to rheumatoid arthritis.  Has been having more pain in her right first MTP.  She states that she has had both first MTPs fused in the distant past.  Hasn't received reclast yet but will call for this. Chronic low back pain radiating down her leg; not doing PT exercises; hasn't returned to her surgeon for eval. Nasal sore chronically.     Denies fevers, chills, nausea, vomiting, constipation, diarrhea. No abdominal pain. No chest pain/pressure, palpitations, or shortness of breath. No oral sores.   . No rash. No LE swelling.     Tobacco: quit in 1999  EtOH: 1 glass of wine every month at most  Drugs: None  Occupation: , retired     ROS   12 point review of system was completed and negative except as noted in the HPI     Active Problem List     Patient Active Problem List   Diagnosis     Rheumatoid arthritis involving right hand with positive rheumatoid factor (H)     History of colonic polyps     Multinodular goiter     CARDIOVASCULAR SCREENING; LDL GOAL LESS THAN 130     Pulmonary nodule     PSEUDOPHAKIA OU     PVD (POSTERIOR VITREOUS DETACHMENT) OU     PXF (PSEUDOEXFOLIATION OF LENS CAPSULE) OD     GERD (gastroesophageal reflux disease)     Hyperlipidemia LDL goal <100     Advance Care Planning     Neuropathy     SHERRY (obstructive sleep apnea)-severe (AHI 35)     zEncounter for counseling     Anemia of chronic disease     Osteoporosis     Cornea guttata, ou     Conjunctival concretions     Stenosis, spinal, lumbar     Chronic pain     Class 1 obesity due to excess calories with serious comorbidity and body mass index (BMI) of 31.0 to 31.9 in adult     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     MGD (meibomian gland dysfunction)     Blepharitis of both eyes     Personal history  of healed osteoporosis fracture     Iron malabsorption     Hyperglycemia     Chronic bilateral low back pain without sciatica     Allergic state, subsequent encounter     Anxiety     Low iron     History of depression     DDD (degenerative disc disease), lumbar     Chronic right shoulder pain     Past Medical History     Past Medical History:   Diagnosis Date     Acute posthemorrhagic anemia 10/13/2012     Closed fracture of multiple ribs of left side, initial encounter 2019     Ex-smoker 1999     Gastroenteritis 2020    Gastroenteritis with norovirus     Herniated nucleus pulposus, L3-4 3/1/2017     Hip joint replacement by other means 07/10/2008     History of blood transfusion      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     History of total knee replacement 2009     Menopause 1989    late 40's     Migraine 2014    resolved     Other chronic pain     joints     Pelvic fracture (H) 2014     Rheumatoid arteritis (H)      S/P lumbar fusion 2017     Sleep apnea      Vitamin B12 deficiency      Past Surgical History     Past Surgical History:   Procedure Laterality Date     ARTHROSCOPY KNEE RT/LT  2006    left     BACK SURGERY      disc     BREAST SURGERY      lumpectomy 90's     C ANESTH,TOTAL HIP ARTHROPLASTY      Rt hip     C HAND/FINGER SURGERY UNLISTED  2005    right hand     C TOTAL KNEE ARTHROPLASTY  2006    left     CATARACT IOL, RT/LT Bilateral 2010 aproximately      SECTION  1966      SECTION  1972     COLONOSCOPY  2009,     HC ESOPH/GAS REFLUX TEST W NASAL IMPED >1 HR  2012    Procedure:ESOPHAGEAL IMPEDENCE FUNCTION TEST WITH 24 HOUR PH GREATER THAN 1 HOUR; Surgeon:KAYKAY JULIO; Location:[facility]     IR TRANSLAMINAR EPIDURAL LUMBAR INJ INCL IMAGING  2012    LESI L5-S1 at [facility]     OPTICAL TRACKING SYSTEM FUSION POSTERIOR SPINE LUMBAR N/A 2017    Procedure: OPTICAL TRACKING SYSTEM FUSION  "SPINE POSTERIOR LUMBAR ONE LEVEL;  Surgeon: Anthony Michele MD;  Location: [facility]     ORTHOPEDIC SURGERY  1991    left foot surgery     ORTHOPEDIC SURGERY  1995    R MCP surgery     ORTHOPEDIC SURGERY  2007    right knee total replacement     Allergy     Allergies   Allergen Reactions     Abatacept Other (See Comments)     Severe headaches  Migraine     Celebrex [Celecoxib]      Ineffective     Celecoxib Unknown and Nausea     Ethanol      Antihistamines     Orencia [Abatacept]      Headache     Septra [Bactrim]      Sulfa Drugs Nausea and Vomiting     \"deathly ill\"  Allergic to everything with Sulfa in it.     Sulfamethoxazole-Trimethoprim Nausea and Nausea and Vomiting     Tramadol Other (See Comments)     Headache  Migraine headache     Valdecoxib Unknown and Nausea     Made me \"very very ill\", might of been \"cramping\"     Adhesive Tape Rash     Plastic tape  Plastic tapes     Antihistamines, Chlorpheniramine-Type  [Alkylamines] Anxiety and Other (See Comments)     Current Medication List     Current Outpatient Medications   Medication Sig     acetaminophen (TYLENOL) 500 MG tablet Take 2 tablets (1,000 mg) by mouth every 8 hours as needed for pain     atorvastatin (LIPITOR) 40 MG tablet Take 1 tablet (40 mg) by mouth daily     Cholecalciferol (VITAMIN D-3 PO) Take 1,000 Units by mouth 2 times daily     desvenlafaxine (PRISTIQ) 100 MG 24 hr tablet TAKE ONE TABLET BY MOUTH ONCE DAILY     Ferrous Sulfate 324 (65 Fe) MG TBEC Take 324 mg by mouth daily     folic acid 800 MCG tablet Take 2 tablets (1,600 mcg) by mouth daily     Methotrexate, PF, (RASUVO) 25 MG/0.5ML autoinjector Inject 0.5 mLs (25 mg) Subcutaneous every 7 days . Hold for signs of infection, and seek medical attention.     naproxen sodium 220 MG capsule Take 220 mg by mouth 2 times daily (with meals)     pantoprazole (PROTONIX) 40 MG EC tablet TAKE ONE TABLET BY MOUTH ONCE DAILY 30-60 MINUTES BEFORE A MEAL     sucralfate (CARAFATE) 1 GM tablet " "TAKE ONE TABLET BY MOUTH FOUR TIMES A DAY AS NEEDED FOR HEARTBURN     tiZANidine (ZANAFLEX) 2 MG tablet Take 0.5-1 tablets (1-2 mg) by mouth 3 times daily as needed for muscle spasms     tofacitinib (XELJANZ XR) 11 MG 24 hr tablet Take 1 tablet (11 mg) by mouth daily     topiramate (TOPAMAX) 50 MG tablet Take 1 tablet (50 mg) by mouth 2 times daily     triamcinolone (KENALOG) 0.1 % external cream Apply topically 2 times daily as needed for irritation     valACYclovir (VALTREX) 500 MG tablet Take 1 tablet (500 mg) by mouth 2 times daily     ibuprofen (ADVIL) 200 MG tablet Take 200 mg by mouth every 4 hours as needed for mild pain     order for DME Equipment being ordered: Up walker     No current facility-administered medications for this visit.      Social History   See HPI    Family History     Family History   Problem Relation Age of Onset     Arthritis Mother      Alzheimer Disease Mother      Hyperlipidemia Mother      Osteoporosis Mother      Heart Disease Father         MI ( from this)     Alcohol/Drug Father      Arthritis Sister      Hypertension Sister      Cancer Son      Diabetes Son      Neurologic Disorder Sister         Schizophrenic     Hypertension Sister      Hyperlipidemia Sister      Mental Illness Sister      Diabetes Son      Other Cancer Son      Glaucoma Daughter      No change in family history since the previous clinic visit.    Physical Exam     Temp Readings from Last 3 Encounters:   20 97.6  F (36.4  C) (Oral)   20 97.8  F (36.6  C) (Oral)   20 98.1  F (36.7  C) (Oral)     BP Readings from Last 5 Encounters:   20 112/76   20 128/74   20 132/58   20 130/85   20 124/68     Pulse Readings from Last 1 Encounters:   20 88     Resp Readings from Last 1 Encounters:   20 16     Estimated body mass index is 31.47 kg/m  as calculated from the following:    Height as of 20: 1.676 m (5' 6\").    Weight as of 20: 88.5 kg (195 " lb).    No vitals taken today because this is a virtual visit    GEN: NAD. Healthy appearing adult.   HEENT: MMM.  Anicteric, noninjected sclera. No obvious external lesions of the ear and nose. Hearing intact.  PULM: No increased work of breathing  MSK:  Hands and wrists without swelling.    SKIN: No rash or jaundice seen  PSYCH: Alert. Appropriate.        Labs     RNP/Sm/SSA/SSB  Recent Labs   Lab Test 07/24/13  1024   ENASSA 8   ENASSB 0     IgG  Recent Labs   Lab Test 07/24/13  1024              CBC  Recent Labs   Lab Test 02/16/21  1019 11/16/20  1028 08/18/20  0950   WBC 5.7 5.7 6.3   RBC 3.81 3.91 3.73*   HGB 12.5 12.0 10.9*   HCT 38.9 37.8 35.6   * 97 95   RDW 15.2* 16.1* 16.0*    361 363   MCH 32.8 30.7 29.2   MCHC 32.1 31.7 30.6*   NEUTROPHIL 65.8 60.5 63.3   LYMPH 17.0 20.4 17.0   MONOCYTE 12.1 12.8 13.5   EOSINOPHIL 4.9 6.1 6.0   BASOPHIL 0.2 0.2 0.2   ANEU 3.8 3.5 4.0   ALYM 1.0 1.2 1.1   MARYURI 0.7 0.7 0.9   AEOS 0.3 0.4 0.4   ABAS 0.0 0.0 0.0     CMP  Recent Labs   Lab Test 02/16/21  1019 12/18/20  0923 11/16/20  1028 08/18/20  0950 05/22/20  1026 03/27/20  1413 12/19/19  0957 12/19/19  0957 08/15/19  1118 08/15/19  1118 11/23/18  1309 11/23/18  1309 03/27/17  1142 03/27/17  1142 01/27/17  1511 01/27/17  1511   NA  --   --   --   --   --  140  --  142  --  144  --  141  --  139  --  142   POTASSIUM  --   --   --   --   --  3.4  --  3.8  --  3.5  --  3.9  --  4.0  --  4.1   CHLORIDE  --   --   --   --   --  112*  --  111*  --  112*  --  107  --  107  --  107   CO2  --   --   --   --   --  24  --  24  --  25  --  29  --  25  --  25   ANIONGAP  --   --   --   --   --  4  --  7  --  7  --  5  --  7  --  10   GLC  --   --   --   --   --  98  --  88  --  95  --  123*  --  96  --  98   BUN  --   --   --   --   --  18  --  10  --  16  --  14  --  11  --  10   CR 0.62  --  0.60 0.59 0.64 0.57   < > 0.68   < > 0.57   < > 0.59   < > 0.62   < > 0.62   GFRESTIMATED 86  --  87 88 86  89   < > 84   < > 89   < > >90   < > >90  Non  GFR Calc     < > >90  Non  GFR Calc     GFRESTBLACK >90  --  >90 >90 >90 >90   < > >90   < > >90   < > >90   < > >90  African American GFR Calc     < > >90   GFR Calc     BRANDEE  --  9.6  --   --   --  9.1  --  9.4  --  9.6   < > 9.2  --  9.7  --  9.9   BILITOTAL 0.4  --  0.4 0.3 0.4  --    < > 0.3   < >  --    < > 0.3   < >  --    < > 0.3   ALBUMIN 4.2  --  4.1 3.8 4.1  --    < > 4.0   < >  --    < > 3.9   < >  --    < > 3.8   PROTTOTAL 8.0  --  7.6 7.5 7.7  --    < > 7.6   < >  --    < > 7.6   < >  --    < > 7.6   ALKPHOS 44  --  47 45 49  --    < > 45   < >  --    < > 55   < >  --    < > 45   AST 21  --  25 15 19  --    < > 19   < >  --    < > 29   < >  --    < > 35   ALT 34  --  36 25 23  --    < > 22   < >  --    < > 33   < >  --    < > 39    < > = values in this interval not displayed.       HgA1c  Recent Labs   Lab Test 11/27/20  1043 10/28/16  1238 04/29/16  1137   A1C 5.4 5.5 6.2*     Uric Acid  Recent Labs   Lab Test 12/12/18  1208   URIC 5.4     Iron Studies  Recent Labs   Lab Test 11/23/18  1309 12/14/17  0957 02/15/16  1151 12/30/15  0842   JUANA 47 44 93 16   IRON 150 73  --  31*    332  --  443*   IRONSAT 43 22  --  7*     Calcium/VitaminD  Recent Labs   Lab Test 12/18/20  0923 03/27/20  1413 12/19/19  0957 03/06/19  1351 03/06/19  1351 10/28/16  1239 10/28/16  1239   BRANDEE 9.6 9.1 9.4   < > 9.3   < > 9.4   VITDT 48  --   --   --  41  --  37    < > = values in this interval not displayed.     ESR/CRP  Recent Labs   Lab Test 02/16/21  1019 11/16/20  1028 08/18/20  0950   SED 20 18 29   CRP <2.9 <2.9 <2.9     TSH/T4  Recent Labs   Lab Test 11/27/20  1043 11/23/18  1309 12/14/17  0957 01/27/17  1511 02/05/13  1050 02/05/13  1050   TSH 1.12 0.81 1.06 0.97   < > 0.57   T4  --   --   --  0.95  --  1.07    < > = values in this interval not displayed.     Lipid Panel  Recent Labs   Lab Test 11/27/20  1043  "12/19/19  0957 11/23/18  1309 12/14/17  0957 01/13/14  1109 01/13/14  1109 07/24/13  1024   CHOL 188 166  --  164   < > 135 177   TRIG 142 113  --  121   < > 89 80   HDL 89 89  --  79   < > 49* 89   LDL 71 54 72 61   < > 68 72   VLDL  --   --   --   --   --  18 16   CHOLHDLRATIO  --   --   --   --   --  2.8 2.0   NHDL 99 77  --  85   < >  --   --     < > = values in this interval not displayed.     Hepatitis B  Recent Labs   Lab Test 09/15/15  1159   AUSAB 0.11   HBCAB Nonreactive   HEPBANG Nonreactive     Hepatitis C  Recent Labs   Lab Test 09/15/15  1159   HCVAB Nonreactive   Assay performance characteristics have not been established for newborns,   infants, and children       Tuberculosis Screening  Recent Labs   Lab Test 05/07/18  1033 09/15/15  1200   TBRSLT Negative Negative   TBAGN 0.00 0.00     UA  Recent Labs   Lab Test 07/24/13  1035   COLOR Yellow   APPEARANCE Clear   URINEGLC Negative   URINEBILI Negative   SG <=1.005   URINEPH 6.0   PROTEIN Negative   UROBILINOGEN 0.2   NITRITE Negative   UBLD Negative   LEUKEST Negative   WBCU O - 2   RBCU O - 2   SQUAMOUSEPI Few     Urine Microscopic  Recent Labs   Lab Test 07/24/13  1035   WBCU O - 2   RBCU O - 2   SQUAMOUSEPI Few       \"HAND BILATERAL THREE OR MORE VIEWS 9/15/2015 12:28 PM   HISTORY: Rheumatoid arthritis; establish baseline. Rheumatoid  arthritis(714.0)  COMPARISON: None  IMPRESSION  IMPRESSION: There is diffuse osteopenia. Postoperative changes of the  right second and third metacarpophalangeal joints. Moderate joint  space narrowing involving the left second and third  metacarpophalangeal joints. Mild joint space narrowing of the right  fourth and fifth metacarpophalangeal joints. There are also small  periarticular erosive changes of the metacarpophalangeal joints. There  is fusion of several of the right carpal bones. Marked joint space  loss in the left wrist. Findings are consistent with the clinical  history of rheumatoid arthritis. Chronic " "fracture deformities of the  right distal radius and ulna. No acute fracture is seen.  SPENCER MONSALVE MD\"    Immunization History     Immunization History   Administered Date(s) Administered     FLU 6-35 months 10/24/2006, 11/14/2007, 10/22/2008, 10/21/2009     Flu, Unspecified 10/20/2013     Influenza (High Dose) 3 valent vaccine 10/28/2013, 09/23/2014, 11/11/2015, 09/23/2016, 09/24/2019     Influenza (IIV3) PF 10/12/1999, 10/26/2001, 10/19/2002, 10/30/2003, 10/28/2004, 10/21/2005, 10/26/2007, 10/21/2009, 10/05/2011, 10/13/2012     Influenza Vaccine IM > 6 months Valent IIV4 09/24/2020     Influenza Vaccine Im 4yrs+ 4 Valent CCIIV4 09/28/2017, 09/27/2018     Mantoux Tuberculin Skin Test 11/03/2006     Pneumo Conj 13-V (2010&after) 07/29/2015     Pneumococcal 23 valent 06/26/2000, 10/26/2001, 06/01/2007, 06/02/2010     TD (ADULT, 7+) 12/10/2008, 07/20/2009     Tdap (Adult) Unspecified Formulation 12/12/2018     Zoster vaccine recombinant adjuvanted (SHINGRIX) 12/12/2018, 02/14/2019          Chart documentation done in part with Dragon Voice recognition Software. Although reviewed after completion, some word and grammatical error may remain.      Video-Visit Details    Type of service:  Video Visit    Video Start Time: 11:06 AM    Video End Time: 11:28 AM    Originating Location (pt. Location): [place], [place]    Distant Location (provider location):  Home    Platform used for Video Visit: Gordo Byers MD      "

## 2021-03-05 ENCOUNTER — VIRTUAL VISIT (OUTPATIENT)
Dept: RHEUMATOLOGY | Facility: CLINIC | Age: 81
End: 2021-03-05
Payer: COMMERCIAL

## 2021-03-05 DIAGNOSIS — M81.0 OSTEOPOROSIS WITHOUT CURRENT PATHOLOGICAL FRACTURE, UNSPECIFIED OSTEOPOROSIS TYPE: ICD-10-CM

## 2021-03-05 DIAGNOSIS — Z11.59 SCREENING FOR VIRAL DISEASE: ICD-10-CM

## 2021-03-05 DIAGNOSIS — Z79.899 HIGH RISK MEDICATION USE: ICD-10-CM

## 2021-03-05 DIAGNOSIS — M05.79 RHEUMATOID ARTHRITIS INVOLVING MULTIPLE SITES WITH POSITIVE RHEUMATOID FACTOR (H): Primary | Chronic | ICD-10-CM

## 2021-03-05 DIAGNOSIS — M19.071 OSTEOARTHRITIS OF FIRST METATARSOPHALANGEAL (MTP) JOINT OF RIGHT FOOT: ICD-10-CM

## 2021-03-05 PROCEDURE — 99214 OFFICE O/P EST MOD 30 MIN: CPT | Mod: 95 | Performed by: INTERNAL MEDICINE

## 2021-03-05 RX ORDER — METHOTREXATE 25 MG/.5ML
25 INJECTION, SOLUTION SUBCUTANEOUS
Qty: 2 ML | Refills: 4 | Status: SHIPPED | OUTPATIENT
Start: 2021-03-05 | End: 2021-06-18

## 2021-03-05 RX ORDER — HEPARIN SODIUM,PORCINE 10 UNIT/ML
5 VIAL (ML) INTRAVENOUS
Status: CANCELLED | OUTPATIENT
Start: 2021-03-05

## 2021-03-05 RX ORDER — ZOLEDRONIC ACID 5 MG/100ML
5 INJECTION, SOLUTION INTRAVENOUS ONCE
Status: CANCELLED
Start: 2021-03-05 | End: 2021-03-05

## 2021-03-05 RX ORDER — LEUCOVORIN CALCIUM 5 MG/1
5 TABLET ORAL
Qty: 13 TABLET | Refills: 1 | Status: SHIPPED | OUTPATIENT
Start: 2021-03-05 | End: 2021-06-18

## 2021-03-05 RX ORDER — HEPARIN SODIUM (PORCINE) LOCK FLUSH IV SOLN 100 UNIT/ML 100 UNIT/ML
5 SOLUTION INTRAVENOUS
Status: CANCELLED | OUTPATIENT
Start: 2021-03-05

## 2021-03-09 ENCOUNTER — TRANSFERRED RECORDS (OUTPATIENT)
Dept: HEALTH INFORMATION MANAGEMENT | Facility: CLINIC | Age: 81
End: 2021-03-09

## 2021-03-11 ENCOUNTER — OFFICE VISIT (OUTPATIENT)
Dept: PODIATRY | Facility: CLINIC | Age: 81
End: 2021-03-11
Payer: COMMERCIAL

## 2021-03-11 ENCOUNTER — ANCILLARY PROCEDURE (OUTPATIENT)
Dept: GENERAL RADIOLOGY | Facility: CLINIC | Age: 81
End: 2021-03-11
Attending: PODIATRIST
Payer: COMMERCIAL

## 2021-03-11 VITALS — SYSTOLIC BLOOD PRESSURE: 122 MMHG | DIASTOLIC BLOOD PRESSURE: 76 MMHG | HEART RATE: 88 BPM

## 2021-03-11 DIAGNOSIS — M19.071 OSTEOARTHRITIS OF FIRST METATARSOPHALANGEAL (MTP) JOINT OF RIGHT FOOT: ICD-10-CM

## 2021-03-11 PROCEDURE — 73630 X-RAY EXAM OF FOOT: CPT | Mod: RT | Performed by: RADIOLOGY

## 2021-03-11 PROCEDURE — 99214 OFFICE O/P EST MOD 30 MIN: CPT | Performed by: PODIATRIST

## 2021-03-11 NOTE — PATIENT INSTRUCTIONS
We wish you continued good healing. If you have any questions or concerns, please do not hesitate to contact us at [phone]    [facility] (secure e-mail communication and access to your chart) to send a message or to make an appointment.    Please remember to call and schedule a follow up appointment if one was recommended at your earliest convenience.     +++OF MARCH 2020+++ LOCATION AND HOURS HAVE CHANGED    PLEASE CALL CLINICS TO VERIFY DAYS AND TIMES  PODIATRY CLINIC HOURS  TELEPHONE NUMBER    Dr. Josse EVANSPRAYMUNDO [facility]        Clinics:  [facility]   Tuesday 1PM-6PM  [place]  Wednesday 745AM-330PM  [place]  Thursday/Friday 745AM-230PM  [place] [place] APPOINT[facility]  [phone]    [place] APPOINTMENTS  [phone]          If you need a medication refill, please contact us you may need lab work and/or a follow up visit prior to your refill (i.e. Antifungal medications).    If MRI needed please call Imaging at [phone] or [phone]    HOW DO I GET MY KNEE SCOOTER? Knee scooters can be picked up at ANY Medical Supply stores with your knee scooter Prescription.  OR    Bring your signed prescription to an [facility] showroom.

## 2021-03-11 NOTE — LETTER
"    3/11/2021         RE: Ginger Marshall  [address]  [facility] [address]        Dear Colleague,    Thank you for referring your patient, Ginger Marshall, to the [facility]. Please see a copy of my visit note below.     Subjective:    Pt is seen today in consult from Dr. Byers with the c/c of painful right foot.  This has been symptomatic for years.  Patient had an attempted fusion here many years ago.  Apparently the surgeon at that time did not have the right equipment so there was only one screw in place and this was eventually removed.  Over time she has developed hypertrophic bone around this.  If she wears a large shoe it does not hurt but any other shoe causes pain.  Pain aggravated activity and relieved by rest.  She had a successful fusion on the contralateral foot in the past.  Patient has also had hammertoe repair.  Patient has rheumatoid arthritis.  She has history of multiple stress fractures.  Past history of smoking.  Her mother has history of arthritis.  She is retired.    ROS:  A 10-point review of systems was performed and is positive for that noted in the HPI and as seen above.  All other areas are negative.          Allergies   Allergen Reactions     Abatacept Other (See Comments)     Severe headaches  Migraine     Celebrex [Celecoxib]      Ineffective     Celecoxib Unknown and Nausea     Ethanol      Antihistamines     Orencia [Abatacept]      Headache     Septra [Bactrim]      Sulfa Drugs Nausea and Vomiting     \"deathly ill\"  Allergic to everything with Sulfa in it.     Sulfamethoxazole-Trimethoprim Nausea and Nausea and Vomiting     Tramadol Other (See Comments)     Headache  Migraine headache     Valdecoxib Unknown and Nausea     Made me \"very very ill\", might of been \"cramping\"     Adhesive Tape Rash     Plastic tape  Plastic tapes     Antihistamines, Chlorpheniramine-Type  [Alkylamines] Anxiety and Other (See Comments)       Current Outpatient " Medications   Medication Sig Dispense Refill     acetaminophen (TYLENOL) 500 MG tablet Take 2 tablets (1,000 mg) by mouth every 8 hours as needed for pain 100 tablet 0     atorvastatin (LIPITOR) 40 MG tablet Take 1 tablet (40 mg) by mouth daily 90 tablet 3     Cholecalciferol (VITAMIN D-3 PO) Take 1,000 Units by mouth 2 times daily       desvenlafaxine (PRISTIQ) 100 MG 24 hr tablet TAKE ONE TABLET BY MOUTH ONCE DAILY 90 tablet 1     Ferrous Sulfate 324 (65 Fe) MG TBEC Take 324 mg by mouth daily       folic acid 800 MCG tablet Take 2 tablets (1,600 mcg) by mouth daily       leucovorin (WELLCOVORIN) 5 MG tablet Take 1 tablet (5 mg) by mouth every 7 days . Take 24 hours after taking Methotrexate each week. 13 tablet 1     Methotrexate, PF, (RASUVO) 25 MG/0.5ML autoinjector Inject 0.5 mLs (25 mg) Subcutaneous every 7 days . Hold for signs of infection, and seek medical attention. 2 mL 4     naproxen sodium 220 MG capsule Take 220 mg by mouth 2 times daily (with meals)       pantoprazole (PROTONIX) 40 MG EC tablet TAKE ONE TABLET BY MOUTH ONCE DAILY 30-60 MINUTES BEFORE A MEAL 90 tablet 1     sucralfate (CARAFATE) 1 GM tablet TAKE ONE TABLET BY MOUTH FOUR TIMES A DAY AS NEEDED FOR HEARTBURN 120 tablet 1     tiZANidine (ZANAFLEX) 2 MG tablet Take 0.5-1 tablets (1-2 mg) by mouth 3 times daily as needed for muscle spasms 30 tablet 0     tofacitinib (XELJANZ XR) 11 MG 24 hr tablet Take 1 tablet (11 mg) by mouth daily 90 tablet 2     topiramate (TOPAMAX) 50 MG tablet Take 1 tablet (50 mg) by mouth 2 times daily 180 tablet 11     triamcinolone (KENALOG) 0.1 % external cream Apply topically 2 times daily as needed for irritation 30 g 1     valACYclovir (VALTREX) 500 MG tablet Take 1 tablet (500 mg) by mouth 2 times daily 180 tablet 1       Patient Active Problem List   Diagnosis     Rheumatoid arthritis involving right hand with positive rheumatoid factor (H)     History of colonic polyps     Multinodular goiter      CARDIOVASCULAR SCREENING; LDL GOAL LESS THAN 130     Pulmonary nodule     PSEUDOPHAKIA OU     PVD (POSTERIOR VITREOUS DETACHMENT) OU     PXF (PSEUDOEXFOLIATION OF LENS CAPSULE) OD     GERD (gastroesophageal reflux disease)     Hyperlipidemia LDL goal <100     Advance Care Planning     Neuropathy     SHERRY (obstructive sleep apnea)-severe (AHI 35)     zEncounter for counseling     Anemia of chronic disease     Osteoporosis     Cornea guttata, ou     Conjunctival concretions     Stenosis, spinal, lumbar     Chronic pain     Class 1 obesity due to excess calories with serious comorbidity and body mass index (BMI) of 31.0 to 31.9 in adult     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     MGD (meibomian gland dysfunction)     Blepharitis of both eyes     Personal history of healed osteoporosis fracture     Iron malabsorption     Hyperglycemia     Chronic bilateral low back pain without sciatica     Allergic state, subsequent encounter     Anxiety     Low iron     History of depression     DDD (degenerative disc disease), lumbar     Chronic right shoulder pain       Past Medical History:   Diagnosis Date     Acute posthemorrhagic anemia 10/13/2012     Closed fracture of multiple ribs of left side, initial encounter 11/25/2019     Ex-smoker 01/1999     Gastroenteritis 03/21/2020    Gastroenteritis with norovirus     Herniated nucleus pulposus, L3-4 3/1/2017     Hip joint replacement by other means 07/10/2008     History of blood transfusion      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     History of total knee replacement 07/23/2009     Menopause 1989    late 40's     Migraine 04/27/2014    resolved     Other chronic pain     joints     Pelvic fracture (H) 05/13/2014     Rheumatoid arteritis (H)      S/P lumbar fusion 04/03/2017     Sleep apnea      Vitamin B12 deficiency        Past Surgical History:   Procedure Laterality Date     ARTHROSCOPY KNEE RT/LT  01/2006    left     BACK SURGERY  2013    disc     BREAST  SURGERY      lumpectomy 90's     C ANESTH,TOTAL HIP ARTHROPLASTY      Rt hip     C HAND/FINGER SURGERY UNLISTED  2005    right hand     C TOTAL KNEE ARTHROPLASTY      left     CATARACT IOL, RT/LT Bilateral 2010 aproximately      SECTION  1966      SECTION  1972     COLONOSCOPY  2009,     HC ESOPH/GAS REFLUX TEST W NASAL IMPED >1 HR  2012    Procedure:ESOPHAGEAL IMPEDENCE FUNCTION TEST WITH 24 HOUR PH GREATER THAN 1 HOUR; Surgeon:KAYKAY JULIO; Location:[facility]     IR TRANSLAMINAR EPIDURAL LUMBAR INJ INCL IMAGING  2012    LESI L5-S1 at [facility]     OPTICAL TRACKING SYSTEM FUSION POSTERIOR SPINE LUMBAR N/A 2017    Procedure: OPTICAL TRACKING SYSTEM FUSION SPINE POSTERIOR LUMBAR ONE LEVEL;  Surgeon: Anthony Michele MD;  Location: [facility]     ORTHOPEDIC SURGERY      left foot surgery     ORTHOPEDIC SURGERY      R MCP surgery     ORTHOPEDIC SURGERY      right knee total replacement       Family History   Problem Relation Age of Onset     Arthritis Mother      Alzheimer Disease Mother      Hyperlipidemia Mother      Osteoporosis Mother      Heart Disease Father         MI ( from this)     Alcohol/Drug Father      Arthritis Sister      Hypertension Sister      Cancer Son      Diabetes Son      Neurologic Disorder Sister         Schizophrenic     Hypertension Sister      Hyperlipidemia Sister      Mental Illness Sister      Diabetes Son      Other Cancer Son      Glaucoma Daughter        Social History     Tobacco Use     Smoking status: Former Smoker     Packs/day: 1.00     Years: 41.00     Pack years: 41.00     Types: Cigarettes     Quit date: 1999     Years since quittin.2     Smokeless tobacco: Never Used     Tobacco comment: former smoker   Substance Use Topics     Alcohol use: Never     Alcohol/week: 0.0 standard drinks     Frequency: Never         Exam:    Vitals: /76   Pulse 88   BMI: There is no height or  weight on file to calculate BMI.  Height: Data Unavailable    Constitutional/ general:  Pt is in no apparent distress, appears well-nourished.  Cooperative with history and physical exam.     Psych:  The patient answered questions appropriately.  Normal affect.  Seems to have reasonable expectations, in terms of treatment.     Eyes:  Visual scanning/ tracking without deficit.     Ears:  Response to auditory stimuli is normal.  negative hearing aid devices.  Auricles in proper alignment.     Lymphatic:  Popliteal lymph nodes not enlarged.     Lungs:  Non labored breathing, non labored speech. No cough.  No audible wheezing. Even, quiet breathing.       Vascular:  positive pedal pulses bilaterally for both the DP and PT arteries.  CFT < 3 sec.  positive ankle edema/varicosities.  negative pedal hair growth.    Neuro:  Alert and oriented x 3. Coordinated gait.  Light touch sensation is intact to the L4, L5, S1 distributions. No obvious deficits.  No evidence of neurological-based weakness, spasticity, or contracture in the lower extremities.     Derm: Normal texture and turgor.  No erythema, ecchymosis, or cyanosis.      Musculoskeletal:    Lower extremity muscle strength is normal.  Patient is ambulatory without an assistive device or brace.  No gross deformities.  Normal arch.   On patient's left foot the first MTPJ is fused solid in good position.  Can see solid fusion on past x-rays.  On her right foot there is hypertrophic bone around the right first MTPJ.  There is almost no range of motion.  All her lesser toes are rectus.  The fourth and fifth toes are somewhat flail and a solid fusion is noted right second and third toes.    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 Nonspecific soft tissue  and possible intraosseous  calcification adjacent to the second metatarsal phalangeal joint.  Probable osteotomies of the fourth and fifth toe proximal phalangeal  heads. Second and third toe proximal interphalangeal joint fusion.  Osteopenia.     RAZ ANTONIO MD    Assessment:  Hallux Limitus stage IV right foot                         RA    Plan:  X-rays taken today of right foot.  Patient wearing stiff wide shoe today and this is best option to prevent arthritic right joint from becoming painful.  It sounds as though the bump around this arthritic joint bothers her when wearing other shoes.  I made suggestions on wider shoes.  She would also like to discuss surgery.  Explained we would remove the bump ream out the joint and then fuse the joint with plate and screws.  Discussed she would have to be nonweightbearing for at least 6 weeks.  Explained higher risk for nonunion because of age and rheumatoid arthritis.  The patient has a left hip that is quite painful.  Discussed with patient that with nonweightbearing on her right foot she would be placing significant pressure on her left lower extremity and she may want to consider having her hip fixed before fusing this joint.  Also discussed the risk that she can wear her wide slip on shoes and has no pain but always possible she could have pain afterwards if surgery fails.   Patient will consider this and send message if she would like to proceed with surgery.  RTC as needed.  Thank you for allowing me participate in the care of this patient.        Josse Asif DPM FACFAS         Again, thank you for allowing me to participate in the care of your patient.        Sincerely,        Josse sAif DPM

## 2021-03-11 NOTE — PROGRESS NOTES
" Subjective:    Pt is seen today in consult from Dr. Byers with the c/c of painful right foot.  This has been symptomatic for years.  Patient had an attempted fusion here many years ago.  Apparently the surgeon at that time did not have the right equipment so there was only one screw in place and this was eventually removed.  Over time she has developed hypertrophic bone around this.  If she wears a large shoe it does not hurt but any other shoe causes pain.  Pain aggravated activity and relieved by rest.  She had a successful fusion on the contralateral foot in the past.  Patient has also had hammertoe repair.  Patient has rheumatoid arthritis.  She has history of multiple stress fractures.  Past history of smoking.  Her mother has history of arthritis.  She is retired.    ROS:  A 10-point review of systems was performed and is positive for that noted in the HPI and as seen above.  All other areas are negative.          Allergies   Allergen Reactions     Abatacept Other (See Comments)     Severe headaches  Migraine     Celebrex [Celecoxib]      Ineffective     Celecoxib Unknown and Nausea     Ethanol      Antihistamines     Orencia [Abatacept]      Headache     Septra [Bactrim]      Sulfa Drugs Nausea and Vomiting     \"deathly ill\"  Allergic to everything with Sulfa in it.     Sulfamethoxazole-Trimethoprim Nausea and Nausea and Vomiting     Tramadol Other (See Comments)     Headache  Migraine headache     Valdecoxib Unknown and Nausea     Made me \"very very ill\", might of been \"cramping\"     Adhesive Tape Rash     Plastic tape  Plastic tapes     Antihistamines, Chlorpheniramine-Type  [Alkylamines] Anxiety and Other (See Comments)       Current Outpatient Medications   Medication Sig Dispense Refill     acetaminophen (TYLENOL) 500 MG tablet Take 2 tablets (1,000 mg) by mouth every 8 hours as needed for pain 100 tablet 0     atorvastatin (LIPITOR) 40 MG tablet Take 1 tablet (40 mg) by mouth daily 90 tablet 3     " Cholecalciferol (VITAMIN D-3 PO) Take 1,000 Units by mouth 2 times daily       desvenlafaxine (PRISTIQ) 100 MG 24 hr tablet TAKE ONE TABLET BY MOUTH ONCE DAILY 90 tablet 1     Ferrous Sulfate 324 (65 Fe) MG TBEC Take 324 mg by mouth daily       folic acid 800 MCG tablet Take 2 tablets (1,600 mcg) by mouth daily       leucovorin (WELLCOVORIN) 5 MG tablet Take 1 tablet (5 mg) by mouth every 7 days . Take 24 hours after taking Methotrexate each week. 13 tablet 1     Methotrexate, PF, (RASUVO) 25 MG/0.5ML autoinjector Inject 0.5 mLs (25 mg) Subcutaneous every 7 days . Hold for signs of infection, and seek medical attention. 2 mL 4     naproxen sodium 220 MG capsule Take 220 mg by mouth 2 times daily (with meals)       pantoprazole (PROTONIX) 40 MG EC tablet TAKE ONE TABLET BY MOUTH ONCE DAILY 30-60 MINUTES BEFORE A MEAL 90 tablet 1     sucralfate (CARAFATE) 1 GM tablet TAKE ONE TABLET BY MOUTH FOUR TIMES A DAY AS NEEDED FOR HEARTBURN 120 tablet 1     tiZANidine (ZANAFLEX) 2 MG tablet Take 0.5-1 tablets (1-2 mg) by mouth 3 times daily as needed for muscle spasms 30 tablet 0     tofacitinib (XELJANZ XR) 11 MG 24 hr tablet Take 1 tablet (11 mg) by mouth daily 90 tablet 2     topiramate (TOPAMAX) 50 MG tablet Take 1 tablet (50 mg) by mouth 2 times daily 180 tablet 11     triamcinolone (KENALOG) 0.1 % external cream Apply topically 2 times daily as needed for irritation 30 g 1     valACYclovir (VALTREX) 500 MG tablet Take 1 tablet (500 mg) by mouth 2 times daily 180 tablet 1       Patient Active Problem List   Diagnosis     Rheumatoid arthritis involving right hand with positive rheumatoid factor (H)     History of colonic polyps     Multinodular goiter     CARDIOVASCULAR SCREENING; LDL GOAL LESS THAN 130     Pulmonary nodule     PSEUDOPHAKIA OU     PVD (POSTERIOR VITREOUS DETACHMENT) OU     PXF (PSEUDOEXFOLIATION OF LENS CAPSULE) OD     GERD (gastroesophageal reflux disease)     Hyperlipidemia LDL goal <100     Advance Care  Planning     Neuropathy     SHERRY (obstructive sleep apnea)-severe (AHI 35)     zEncounter for counseling     Anemia of chronic disease     Osteoporosis     Cornea guttata, ou     Conjunctival concretions     Stenosis, spinal, lumbar     Chronic pain     Class 1 obesity due to excess calories with serious comorbidity and body mass index (BMI) of 31.0 to 31.9 in adult     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     MGD (meibomian gland dysfunction)     Blepharitis of both eyes     Personal history of healed osteoporosis fracture     Iron malabsorption     Hyperglycemia     Chronic bilateral low back pain without sciatica     Allergic state, subsequent encounter     Anxiety     Low iron     History of depression     DDD (degenerative disc disease), lumbar     Chronic right shoulder pain       Past Medical History:   Diagnosis Date     Acute posthemorrhagic anemia 10/13/2012     Closed fracture of multiple ribs of left side, initial encounter 2019     Ex-smoker 1999     Gastroenteritis 2020    Gastroenteritis with norovirus     Herniated nucleus pulposus, L3-4 3/1/2017     Hip joint replacement by other means 07/10/2008     History of blood transfusion      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     History of total knee replacement 2009     Menopause 1989    late 40's     Migraine 2014    resolved     Other chronic pain     joints     Pelvic fracture (H) 2014     Rheumatoid arteritis (H)      S/P lumbar fusion 2017     Sleep apnea      Vitamin B12 deficiency        Past Surgical History:   Procedure Laterality Date     ARTHROSCOPY KNEE RT/LT  2006    left     BACK SURGERY      disc     BREAST SURGERY      lumpectomy 90's     C ANESTH,TOTAL HIP ARTHROPLASTY      Rt hip     C HAND/FINGER SURGERY UNLISTED  2005    right hand     C TOTAL KNEE ARTHROPLASTY      left     CATARACT IOL, RT/LT Bilateral 2010 aproximately      SECTION   1966      SECTION  1972     COLONOSCOPY  2009,     HC ESOPH/GAS REFLUX TEST W NASAL IMPED >1 HR  2012    Procedure:ESOPHAGEAL IMPEDENCE FUNCTION TEST WITH 24 HOUR PH GREATER THAN 1 HOUR; Surgeon:KAYKAY JULIO; Location:[facility]     IR TRANSLAMINAR EPIDURAL LUMBAR INJ INCL IMAGING  2012    LESI L5-S1 at [facility]     OPTICAL TRACKING SYSTEM FUSION POSTERIOR SPINE LUMBAR N/A 2017    Procedure: OPTICAL TRACKING SYSTEM FUSION SPINE POSTERIOR LUMBAR ONE LEVEL;  Surgeon: Anthony Michele MD;  Location: [facility]     ORTHOPEDIC SURGERY      left foot surgery     ORTHOPEDIC SURGERY      R MCP surgery     ORTHOPEDIC SURGERY      right knee total replacement       Family History   Problem Relation Age of Onset     Arthritis Mother      Alzheimer Disease Mother      Hyperlipidemia Mother      Osteoporosis Mother      Heart Disease Father         MI ( from this)     Alcohol/Drug Father      Arthritis Sister      Hypertension Sister      Cancer Son      Diabetes Son      Neurologic Disorder Sister         Schizophrenic     Hypertension Sister      Hyperlipidemia Sister      Mental Illness Sister      Diabetes Son      Other Cancer Son      Glaucoma Daughter        Social History     Tobacco Use     Smoking status: Former Smoker     Packs/day: 1.00     Years: 41.00     Pack years: 41.00     Types: Cigarettes     Quit date: 1999     Years since quittin.2     Smokeless tobacco: Never Used     Tobacco comment: former smoker   Substance Use Topics     Alcohol use: Never     Alcohol/week: 0.0 standard drinks     Frequency: Never         Exam:    Vitals: /76   Pulse 88   BMI: There is no height or weight on file to calculate BMI.  Height: Data Unavailable    Constitutional/ general:  Pt is in no apparent distress, appears well-nourished.  Cooperative with history and physical exam.     Psych:  The patient answered questions appropriately.  Normal affect.  Seems  to have reasonable expectations, in terms of treatment.     Eyes:  Visual scanning/ tracking without deficit.     Ears:  Response to auditory stimuli is normal.  negative hearing aid devices.  Auricles in proper alignment.     Lymphatic:  Popliteal lymph nodes not enlarged.     Lungs:  Non labored breathing, non labored speech. No cough.  No audible wheezing. Even, quiet breathing.       Vascular:  positive pedal pulses bilaterally for both the DP and PT arteries.  CFT < 3 sec.  positive ankle edema/varicosities.  negative pedal hair growth.    Neuro:  Alert and oriented x 3. Coordinated gait.  Light touch sensation is intact to the L4, L5, S1 distributions. No obvious deficits.  No evidence of neurological-based weakness, spasticity, or contracture in the lower extremities.     Derm: Normal texture and turgor.  No erythema, ecchymosis, or cyanosis.      Musculoskeletal:    Lower extremity muscle strength is normal.  Patient is ambulatory without an assistive device or brace.  No gross deformities.  Normal arch.   On patient's left foot the first MTPJ is fused solid in good position.  Can see solid fusion on past x-rays.  On her right foot there is hypertrophic bone around the right first MTPJ.  There is almost no range of motion.  All her lesser toes are rectus.  The fourth and fifth toes are somewhat flail and a solid fusion is noted right second and third toes.    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 Nonspecific soft tissue and possible intraosseous  calcification adjacent to the second metatarsal phalangeal joint.  Probable osteotomies of the fourth and fifth toe proximal phalangeal  heads. Second and third toe proximal interphalangeal joint fusion.  Osteopenia.     RAZ ANTONIO,  MD    Assessment:  Hallux Limitus stage IV right foot                         RA    Plan:  X-rays taken today of right foot.  Patient wearing stiff wide shoe today and this is best option to prevent arthritic right joint from becoming painful.  It sounds as though the bump around this arthritic joint bothers her when wearing other shoes.  I made suggestions on wider shoes.  She would also like to discuss surgery.  Explained we would remove the bump ream out the joint and then fuse the joint with plate and screws.  Discussed she would have to be nonweightbearing for at least 6 weeks.  Explained higher risk for nonunion because of age and rheumatoid arthritis.  The patient has a left hip that is quite painful.  Discussed with patient that with nonweightbearing on her right foot she would be placing significant pressure on her left lower extremity and she may want to consider having her hip fixed before fusing this joint.  Also discussed the risk that she can wear her wide slip on shoes and has no pain but always possible she could have pain afterwards if surgery fails.   Patient will consider this and send message if she would like to proceed with surgery.  RTC as needed.  Thank you for allowing me participate in the care of this patient.        Josse Asif DPM FACFAS

## 2021-03-18 ENCOUNTER — INFUSION THERAPY VISIT (OUTPATIENT)
Dept: INFUSION THERAPY | Facility: CLINIC | Age: 81
End: 2021-03-18
Payer: COMMERCIAL

## 2021-03-18 VITALS
OXYGEN SATURATION: 95 % | SYSTOLIC BLOOD PRESSURE: 128 MMHG | TEMPERATURE: 97.9 F | DIASTOLIC BLOOD PRESSURE: 66 MMHG | HEART RATE: 72 BPM | RESPIRATION RATE: 18 BRPM

## 2021-03-18 DIAGNOSIS — M81.0 OSTEOPOROSIS, UNSPECIFIED OSTEOPOROSIS TYPE, UNSPECIFIED PATHOLOGICAL FRACTURE PRESENCE: Primary | ICD-10-CM

## 2021-03-18 LAB — CALCIUM SERPL-MCNC: 9 MG/DL (ref 8.5–10.1)

## 2021-03-18 PROCEDURE — 96365 THER/PROPH/DIAG IV INF INIT: CPT | Performed by: INTERNAL MEDICINE

## 2021-03-18 PROCEDURE — 82310 ASSAY OF CALCIUM: CPT | Performed by: INTERNAL MEDICINE

## 2021-03-18 PROCEDURE — 99207 PR NO CHARGE LOS: CPT

## 2021-03-18 RX ORDER — HEPARIN SODIUM (PORCINE) LOCK FLUSH IV SOLN 100 UNIT/ML 100 UNIT/ML
5 SOLUTION INTRAVENOUS
Status: CANCELLED | OUTPATIENT
Start: 2021-03-18

## 2021-03-18 RX ORDER — ZOLEDRONIC ACID 5 MG/100ML
5 INJECTION, SOLUTION INTRAVENOUS ONCE
Status: CANCELLED
Start: 2021-03-18 | End: 2021-03-18

## 2021-03-18 RX ORDER — COVID-19 ANTIGEN TEST
220 KIT MISCELLANEOUS PRN
COMMUNITY
End: 2022-02-04

## 2021-03-18 RX ORDER — ZOLEDRONIC ACID 5 MG/100ML
5 INJECTION, SOLUTION INTRAVENOUS ONCE
Status: COMPLETED | OUTPATIENT
Start: 2021-03-18 | End: 2021-03-18

## 2021-03-18 RX ORDER — HEPARIN SODIUM,PORCINE 10 UNIT/ML
5 VIAL (ML) INTRAVENOUS
Status: CANCELLED | OUTPATIENT
Start: 2021-03-18

## 2021-03-18 RX ADMIN — Medication 250 ML: at 14:22

## 2021-03-18 RX ADMIN — ZOLEDRONIC ACID 5 MG: 0.05 INJECTION, SOLUTION INTRAVENOUS at 14:21

## 2021-03-18 ASSESSMENT — PAIN SCALES - GENERAL: PAINLEVEL: SEVERE PAIN (7)

## 2021-03-18 NOTE — PROGRESS NOTES
Infusion Nursing Note:  Ginger Marshall presents today for reclast.    Patient seen by provider today: No   present during visit today: Not Applicable.    Note: Per patient, Dr Vázquez told her she did not need to be taking oral calcium.    Ginger reports she sees a provider Dr QUENTIN Michele at [facility] for evaluating and treating her pain.      Intravenous Access:  Peripheral IV placed.    Treatment Conditions:  Calcium 9.0 and creat 0.86.      Post Infusion Assessment:  Patient tolerated infusion without incident.  Blood return noted pre and post infusion.  Site patent and intact, free from redness, edema or discomfort.  No evidence of extravasations.  Access discontinued per protocol.       Discharge Plan:   Patient discharged in stable condition accompanied by: self.  Departure Mode: Ambulatory.  Ginger has provider follow up in June. Reclast will be due in a year.    Marilou Mendiola RN

## 2021-03-30 ENCOUNTER — VIRTUAL VISIT (OUTPATIENT)
Dept: SLEEP MEDICINE | Facility: CLINIC | Age: 81
End: 2021-03-30
Payer: COMMERCIAL

## 2021-03-30 VITALS — BODY MASS INDEX: 32.65 KG/M2 | HEIGHT: 65 IN | WEIGHT: 196 LBS

## 2021-03-30 DIAGNOSIS — G89.29 OTHER CHRONIC PAIN: ICD-10-CM

## 2021-03-30 DIAGNOSIS — G47.33 OSA (OBSTRUCTIVE SLEEP APNEA): Primary | ICD-10-CM

## 2021-03-30 DIAGNOSIS — E66.811 CLASS 1 OBESITY DUE TO EXCESS CALORIES WITH SERIOUS COMORBIDITY AND BODY MASS INDEX (BMI) OF 31.0 TO 31.9 IN ADULT: ICD-10-CM

## 2021-03-30 DIAGNOSIS — E66.09 CLASS 1 OBESITY DUE TO EXCESS CALORIES WITH SERIOUS COMORBIDITY AND BODY MASS INDEX (BMI) OF 31.0 TO 31.9 IN ADULT: ICD-10-CM

## 2021-03-30 PROCEDURE — 99213 OFFICE O/P EST LOW 20 MIN: CPT | Mod: 95 | Performed by: INTERNAL MEDICINE

## 2021-03-30 ASSESSMENT — MIFFLIN-ST. JEOR: SCORE: 1369.93

## 2021-03-30 NOTE — PROGRESS NOTES
Ginger is a 80 year old who is being evaluated via a billable video visit.      How would you like to obtain your AVS? MyChart  If the video visit is dropped, the invitation should be resent by: Text to cell phone: [phone]  Will anyone else be joining your video visit? No      Geraldine Delgado MA on 3/30/2021 at 9:58 AM    Video Start Time: 10:05 AM     Video-Visit Details    Type of service:  Video Visit    Video End Time:10:21 AM    Originating Location (pt. Location): Home    Distant Location (provider location):  [facility]     Platform used for Video Visit: [employer]     Obstructive Sleep Apnea - PAP Follow-Up Visit:    Chief Complaint   Patient presents with     CPAP Follow Up         Ginger Marshall for follow-up of their severe sleep apnea       Patient was originally seen 1/2014 for complaints of snoring, witnessed apnea, non-restorative sleep (ESS 6), persistent disorder of initiating and maintaining sleep, and mild RLS.     Polysomnogram 1/24/2014 (214#) - AHI 35.4, RDI 55.1, O2 magy 88%. CPAP was titrated to an effective pressure of 9 cm/H20 in lateral REM sleep. PLM index 46.6 on CPAP, PLM index on CPAP was 99.       She started CPAP 9 cm/H20 on 2/11/2014. She had some residual apnea on download and her CPAP pressure was changed to 9-15 cm/H20. This improved her apnea but AHI was still 6. At follow-up 6/2014 she was found to have even higher AHI: 23, 14 obstructed, 7 centrals. But was noted to be on narcotics after hip surgery. Her autopap was changed to 9-20, but she then appeared to develop worsened central apneas 9/2014 and was changed back to 9-13cm. At follow-up 10/2014 the AHI was 5.0. At visit 11/2015 AHI elevated at 10.5, appears mostly obstructive in nature by download but patient taking narcotics again for an arm fracture, increased pressure 12-14cm.     At 11/2017 visit she reported she lost 35#. Rationale for continued treatment was soft, but reviewed.  Recommended to continue current treatments. AHI 6, changed pressures to 12-15cm.     She stopped CPAP on her own due to machine 'making too much noise and keeping her awake'.    She says sometimes it 'bothers her' and takes it off <1 week she thinks. She cannot say why she is irritated.     Her PAP interface is Nasal Mask.    Bedtime is typically 9-10, sometimes 830. She denies  Sleep onset  difficulties . Wake time is typically 530-630 due to her animals.    A few times a week she wakes up frequently but goes back to sleep.     She naps every day after lunch while watching TV.     She does not feel rested in the morning.     Total score - [place]: 14 (3/29/2021  8:25 AM)    MAY Total Score: 14      ResMed   Auto-PAP 8.0 - 15.0 cmH2O 30 day usage data:    90% of days with > 4 hours of use. 0/30 days with no use.   Average use 429 minutes per day.   95%ile Leak 29.26 L/min.   CPAP 95% pressure 11.7 cm.   AHI 3.72 events per hour.         Past medical/surgical history, family history, social history, medications and allergies were reviewed.    Current Outpatient Medications   Medication     acetaminophen (TYLENOL) 500 MG tablet     atorvastatin (LIPITOR) 40 MG tablet     Cholecalciferol (VITAMIN D-3 PO)     desvenlafaxine (PRISTIQ) 100 MG 24 hr tablet     Ferrous Sulfate 324 (65 Fe) MG TBEC     folic acid 800 MCG tablet     leucovorin (WELLCOVORIN) 5 MG tablet     Methotrexate, PF, (RASUVO) 25 MG/0.5ML autoinjector     naproxen sodium (ALEVE) 220 MG capsule     pantoprazole (PROTONIX) 40 MG EC tablet     sucralfate (CARAFATE) 1 GM tablet     tiZANidine (ZANAFLEX) 2 MG tablet     tofacitinib (XELJANZ XR) 11 MG 24 hr tablet     topiramate (TOPAMAX) 50 MG tablet     valACYclovir (VALTREX) 500 MG tablet     No current facility-administered medications for this visit.          Problem List:  Patient Active Problem List    Diagnosis Date Noted     Chronic pain 08/04/2015     Priority: High     Patient is followed by Data  Unavailable for ongoing prescription of pain medication.  All refills should be approved by this provider, or covering partner.    Medication(s):. Oxycodone 5 mg   Maximum quantity per month: 60  Clinic visit frequency required: Q 3 months     Controlled substance agreement on file: Yes       Date(s): 5/2014    Pain Clinic evaluation in the past: Yes       Date/Location:  7/8/15 [place] [employer]    DIRE Total Score(s):  No flowsheet data found.    Last [facility] website verification: 5/8/2020   [email or web address]        Rheumatoid arthritis involving right hand with positive rheumatoid factor (H) 06/01/2009     Priority: High     Patient is followed by [facility] for ongoing prescription of narcotic pain medicine.  Med: oxycodone.   Maximum use per month: 90  Expected duration: lifelong  Narcotic agreement on file: YES  Clinic visit recommended: Q 3 months         Anxiety 06/20/2017     Priority: Medium     Hyperglycemia 04/29/2016     Priority: Medium     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 01/04/2016     Priority: Medium     SHERRY (obstructive sleep apnea)-severe (AHI 35) 01/24/2014     Priority: Medium     Polysomnography 1/20/2014: (214.0 lbs). The lowest oxygen saturation was 88.0%. Apnea/Hypopnea Index was 35.4 events per hour.  The REM AHI was N/A.  The RERA index was 19.7 per hour.   The RDI was 55.1. On CPAP optimal pressure was 9 with an AHI of 0.5 including lateral REM sleep. During the diagnostic portion of the study, PLM index was 99.0 per hour.  During the treatment portion of the study, PLM index was 46.6 per hour.        Anemia of chronic disease 05/17/2013     Priority: Medium     Hyperlipidemia LDL goal <100 07/23/2011     Priority: Medium     Chronic right shoulder pain 09/16/2020     Priority: Low     DDD (degenerative disc disease), lumbar 07/16/2019     Priority: Low     Low iron 06/22/2018     Priority: Low     History of depression 06/22/2018     Priority: Low     Allergic  state, subsequent encounter 03/01/2017     Priority: Low     Chronic bilateral low back pain without sciatica 07/17/2016     Priority: Low     Iron malabsorption 04/27/2016     Priority: Low     Personal history of healed osteoporosis fracture 01/26/2016     Priority: Low     MGD (meibomian gland dysfunction) 01/19/2016     Priority: Low     Blepharitis of both eyes 01/19/2016     Priority: Low     Class 1 obesity due to excess calories with serious comorbidity and body mass index (BMI) of 31.0 to 31.9 in adult 08/28/2015     Priority: Low              Stenosis, spinal, lumbar 07/07/2015     Priority: Low     Cornea guttata, ou 05/28/2015     Priority: Low     Conjunctival concretions 05/28/2015     Priority: Low     Neuropathy 07/24/2013     Priority: Low     Diagnosed based on symptoms in July 24, 2013  Increased cymbalta to 60 mg daily.  No emg done to date       zEncounter for counseling 12/07/2012     Priority: Low     Overview:   Patient has identified Health Care Agent(s): Yes  Add Health Care Agents: Yes    Health Care Agent(s):  Primary Health Care Agent: Jamila Relationship: daughter Phone: [phone]   Secondary Health Care Agent:  Relationship:  Phone:    Conservator:  Relationship:  Phone:    Guardian: Relationship:  Phone:      Patient has Advance Care Plan Documents (Health Care Directive, POLST): Yes    Advance Care Plan Documents:  Health Care Directive    Patient has identified Specific Treatment Preferences: Yes   Specific Treatment Preferences: a.) Code Status:  CPR/Attempt Resuscitation        Advance Care Planning 05/15/2012     Priority: Low     Advance Care Planning 7/7/2016: Receipt of ACP document:  Received: POLST which was signed and dated by provider on 4-12-16.  Document previously scanned on 4-18-16.  Has a previous POLST dated 9-30-16 and a previous Resuscitation Guidelines dated 4-7-16. Orders reviewed and found to be valid.  Code Status needs to be updated to reflect choices in  "most recent ACP document. Orders are DNI only.  Confirmed/documented designated decision maker(s).  Added by Bailey Morales RN Advance Care Planning Liaison with Leonie Morrison  Advance Care Planning 7/7/2016: Receipt of ACP document:  Received: Health Care Directive which was witnessed or notarized on 4-25-16.  Document previously scanned on 5-20-16.  Validation form completed and sent to be scanned.  Code Status needs to be updated to reflect choices in most recent ACP document.  Confirmed/documented designated decision maker(s).  Added by Bailey Morales  Advance Care Planning 5/15/2012: Patient states has Advance Directive and will bring in a copy to clinic.            GERD (gastroesophageal reflux disease) 04/12/2011     Priority: Low     nexium worked but insurance would not pay for it.  Was changed to omeprazole and has breakthrough reflux on 40 mg daily.       PVD (POSTERIOR VITREOUS DETACHMENT) OU 01/12/2011     Priority: Low     PXF (PSEUDOEXFOLIATION OF LENS CAPSULE) OD 01/12/2011     Priority: Low     PSEUDOPHAKIA OU 01/07/2011     Priority: Low     Pulmonary nodule 01/04/2011     Priority: Low     Ct needed in 10/11       CARDIOVASCULAR SCREENING; LDL GOAL LESS THAN 130 10/31/2010     Priority: Low     Multinodular goiter      Priority: Low     Ultrasound in 10/10       History of colonic polyps      Priority: Low     tubular adenoma         Osteoporosis 02/27/2008     Priority: Low        Ht 1.651 m (5' 5\")   Wt 88.9 kg (196 lb)   BMI 32.62 kg/m      Impression/Plan:     Mild Sleep apnea. Tolerating PAP well. Persistent Excessive daytime sleepiness.   - Increase pressures to 10-15 cmH20  - Suggested trial of sleep extension, goal >8 hours on PAP as long as insomnia is doing well  - Consider getting animals out of room  - Consider minimizing topamax     Ginger A Sweats will follow up in about 3 month(s).     I spent 15 minutes with patient including counseling, and 5 minutes with chart review, and " documentation

## 2021-03-30 NOTE — PATIENT INSTRUCTIONS
Your BMI is Body mass index is 32.62 kg/m .  Weight management is a personal decision.  If you are interested in exploring weight loss strategies, the following discussion covers the approaches that may be successful. Body mass index (BMI) is one way to tell whether you are at a healthy weight, overweight, or obese. It measures your weight in relation to your height.  A BMI of 18.5 to 24.9 is in the healthy range. A person with a BMI of 25 to 29.9 is considered overweight, and someone with a BMI of 30 or greater is considered obese. More than two-thirds of American adults are considered overweight or obese.  Being overweight or obese increases the risk for further weight gain. Excess weight may lead to heart disease and diabetes.  Creating and following plans for healthy eating and physical activity may help you improve your health.  Weight control is part of healthy lifestyle and includes exercise, emotional health, and healthy eating habits. Careful eating habits lifelong are the mainstay of weight control. Though there are significant health benefits from weight loss, long-term weight loss with diet alone may be very difficult to achieve- studies show long-term success with dietary management in less than 10% of people. Attaining a healthy weight may be especially difficult to achieve in those with severe obesity. In some cases, medications, devices and surgical management might be considered.  What can you do?  If you are overweight or obese and are interested in methods for weight loss, you should discuss this with your provider.     Consider reducing daily calorie intake by 500 calories.     Keep a food journal.     Avoiding skipping meals, consider cutting portions instead.    Diet combined with exercise helps maintain muscle while optimizing fat loss. Strength training is particularly important for building and maintaining muscle mass. Exercise helps reduce stress, increase energy, and improves fitness.  Increasing exercise without diet control, however, may not burn enough calories to loose weight.       Start walking three days a week 10-20 minutes at a time    Work towards walking thirty minutes five days a week     Eventually, increase the speed of your walking for 1-2 minutes at time    In addition, we recommend that you review healthy lifestyles and methods for weight loss available through the National Institutes of Health patient information sites:  http://win.niddk.nih.gov/publications/index.htm    And look into health and wellness programs that may be available through your health insurance provider, employer, local community center, or manuel club.    Weight management plan: Patient was referred to their PCP to discuss a diet and exercise plan.

## 2021-04-27 DIAGNOSIS — K21.9 GASTROESOPHAGEAL REFLUX DISEASE WITHOUT ESOPHAGITIS: ICD-10-CM

## 2021-04-28 RX ORDER — SUCRALFATE 1 G/1
TABLET ORAL
Qty: 120 TABLET | Refills: 1 | Status: SHIPPED | OUTPATIENT
Start: 2021-04-28 | End: 2021-06-17

## 2021-05-10 ENCOUNTER — ANCILLARY PROCEDURE (OUTPATIENT)
Dept: GENERAL RADIOLOGY | Facility: CLINIC | Age: 81
End: 2021-05-10
Attending: NURSE PRACTITIONER
Payer: COMMERCIAL

## 2021-05-10 ENCOUNTER — OFFICE VISIT (OUTPATIENT)
Dept: FAMILY MEDICINE | Facility: CLINIC | Age: 81
End: 2021-05-10
Payer: COMMERCIAL

## 2021-05-10 VITALS
SYSTOLIC BLOOD PRESSURE: 119 MMHG | DIASTOLIC BLOOD PRESSURE: 71 MMHG | RESPIRATION RATE: 17 BRPM | OXYGEN SATURATION: 97 % | WEIGHT: 208 LBS | BODY MASS INDEX: 34.61 KG/M2 | TEMPERATURE: 97.6 F | HEART RATE: 89 BPM

## 2021-05-10 DIAGNOSIS — Z79.899 HIGH RISK MEDICATION USE: ICD-10-CM

## 2021-05-10 DIAGNOSIS — F33.1 MODERATE EPISODE OF RECURRENT MAJOR DEPRESSIVE DISORDER (H): Primary | ICD-10-CM

## 2021-05-10 DIAGNOSIS — R06.09 DYSPNEA ON EXERTION: ICD-10-CM

## 2021-05-10 DIAGNOSIS — K21.9 GASTROESOPHAGEAL REFLUX DISEASE WITHOUT ESOPHAGITIS: ICD-10-CM

## 2021-05-10 DIAGNOSIS — M05.79 RHEUMATOID ARTHRITIS INVOLVING MULTIPLE SITES WITH POSITIVE RHEUMATOID FACTOR (H): Chronic | ICD-10-CM

## 2021-05-10 LAB
ALBUMIN SERPL-MCNC: 3.6 G/DL (ref 3.4–5)
ALP SERPL-CCNC: 45 U/L (ref 40–150)
ALT SERPL W P-5'-P-CCNC: 36 U/L (ref 0–50)
AST SERPL W P-5'-P-CCNC: 22 U/L (ref 0–45)
BASOPHILS # BLD AUTO: 0 10E9/L (ref 0–0.2)
BASOPHILS NFR BLD AUTO: 0.2 %
BILIRUB DIRECT SERPL-MCNC: <0.1 MG/DL (ref 0–0.2)
BILIRUB SERPL-MCNC: 0.3 MG/DL (ref 0.2–1.3)
CREAT SERPL-MCNC: 0.59 MG/DL (ref 0.52–1.04)
CRP SERPL-MCNC: <2.9 MG/L (ref 0–8)
DIFFERENTIAL METHOD BLD: ABNORMAL
EOSINOPHIL # BLD AUTO: 0.3 10E9/L (ref 0–0.7)
EOSINOPHIL NFR BLD AUTO: 6.3 %
ERYTHROCYTE [DISTWIDTH] IN BLOOD BY AUTOMATED COUNT: 14.3 % (ref 10–15)
ERYTHROCYTE [SEDIMENTATION RATE] IN BLOOD BY WESTERGREN METHOD: 32 MM/H (ref 0–30)
GFR SERPL CREATININE-BSD FRML MDRD: 87 ML/MIN/{1.73_M2}
HCT VFR BLD AUTO: 35.6 % (ref 35–47)
HGB BLD-MCNC: 11.3 G/DL (ref 11.7–15.7)
LYMPHOCYTES # BLD AUTO: 0.9 10E9/L (ref 0.8–5.3)
LYMPHOCYTES NFR BLD AUTO: 18.5 %
MCH RBC QN AUTO: 33.3 PG (ref 26.5–33)
MCHC RBC AUTO-ENTMCNC: 31.7 G/DL (ref 31.5–36.5)
MCV RBC AUTO: 105 FL (ref 78–100)
MONOCYTES # BLD AUTO: 0.9 10E9/L (ref 0–1.3)
MONOCYTES NFR BLD AUTO: 17.3 %
NEUTROPHILS # BLD AUTO: 2.9 10E9/L (ref 1.6–8.3)
NEUTROPHILS NFR BLD AUTO: 57.7 %
PLATELET # BLD AUTO: 333 10E9/L (ref 150–450)
PROT SERPL-MCNC: 7 G/DL (ref 6.8–8.8)
RBC # BLD AUTO: 3.39 10E12/L (ref 3.8–5.2)
WBC # BLD AUTO: 5 10E9/L (ref 4–11)

## 2021-05-10 PROCEDURE — 80076 HEPATIC FUNCTION PANEL: CPT | Performed by: INTERNAL MEDICINE

## 2021-05-10 PROCEDURE — 99214 OFFICE O/P EST MOD 30 MIN: CPT | Performed by: NURSE PRACTITIONER

## 2021-05-10 PROCEDURE — 86140 C-REACTIVE PROTEIN: CPT | Performed by: INTERNAL MEDICINE

## 2021-05-10 PROCEDURE — 82565 ASSAY OF CREATININE: CPT | Performed by: INTERNAL MEDICINE

## 2021-05-10 PROCEDURE — 85652 RBC SED RATE AUTOMATED: CPT | Performed by: INTERNAL MEDICINE

## 2021-05-10 PROCEDURE — 85025 COMPLETE CBC W/AUTO DIFF WBC: CPT | Performed by: INTERNAL MEDICINE

## 2021-05-10 PROCEDURE — 36415 COLL VENOUS BLD VENIPUNCTURE: CPT | Performed by: INTERNAL MEDICINE

## 2021-05-10 PROCEDURE — 71046 X-RAY EXAM CHEST 2 VIEWS: CPT | Performed by: RADIOLOGY

## 2021-05-10 RX ORDER — BUSPIRONE HYDROCHLORIDE 5 MG/1
5 TABLET ORAL 2 TIMES DAILY
Qty: 60 TABLET | Refills: 0 | Status: SHIPPED | OUTPATIENT
Start: 2021-05-10 | End: 2021-05-26

## 2021-05-10 ASSESSMENT — PATIENT HEALTH QUESTIONNAIRE - PHQ9: SUM OF ALL RESPONSES TO PHQ QUESTIONS 1-9: 5

## 2021-05-10 NOTE — RESULT ENCOUNTER NOTE
Dear Ginger,    Your recent test results are attached.      Your chest x-ray is normal.  I've put in orders for your to have an echocardiogram.  You will be called to schedule.    If you have any questions please feel free to contact [phone] or myself via [employer].    Sincerely,  Dahiana Flores, CNP

## 2021-05-10 NOTE — PROGRESS NOTES
Assessment & Plan     Moderate episode of recurrent major depressive disorder (H)  Patient to add low dose buspirone.  - busPIRone (BUSPAR) 5 MG tablet; Take 1 tablet (5 mg) by mouth 2 times daily    Gastroesophageal reflux disease without esophagitis  Patient to increase pantoprazole to 40 mg BID to see if this improves cough.    Dyspnea on exertion  Consider echocardiogram pending results.  Recent labs normal.  - XR Chest 2 Views    Ordering of each unique test  Prescription drug management             Return in about 2 weeks (around 5/24/2021) for Medication Recheck- buspirone.    AKIRA Jones CNP  [facility] FELIX Miles is a 80 year old who presents for the following health issues     HPI     Concern - shortness of breathe while walking  Onset: gradually coming on for a while- several months.  Description: hard to breathe with walking, dry cough-2 months, nose running, eyes are watering and itchy in the morning  Intensity: moderate, severe  Progression of Symptoms:  worsening and intermittent  Accompanying Signs & Symptoms: none  Previous history of similar problem: none  Precipitating factors:        Worsened by: walking without walker  Alleviating factors:        Improved by: none  Therapies tried and outcome:  zyrtec    Patient quit smoking in 1999.  Patient denies chest pain, orthopnea, lower extremity edema.  Patient SHERRY and is using CPAP.  Patient notes acid reflux symptoms.  She is taking protonix and sucralfate with improvement in symptoms, but symptoms persist.    Patient notes depressed mood.  She feels its related to her living situation.  She lacks motivation to get things done and feels down in general.        Review of Systems   Constitutional, HEENT, cardiovascular, pulmonary, gi and gu systems are negative, except as otherwise noted.      Objective    /71   Pulse 89   Temp 97.6  F (36.4  C) (Oral)   Resp 17   Wt 94.3 kg (208 lb)   SpO2 97%    BMI 34.61 kg/m    Body mass index is 34.61 kg/m .  Physical Exam   GENERAL: healthy, alert and no distress  RESP: lungs clear to auscultation - no rales, rhonchi or wheezes  CV: regular rate and rhythm, normal S1 S2, no S3 or S4, no murmur, click or rub, no peripheral edema and peripheral pulses strong  MS: no gross musculoskeletal defects noted, no edema  PSYCH: mentation appears normal and affect flat

## 2021-05-21 ENCOUNTER — ANCILLARY PROCEDURE (OUTPATIENT)
Dept: CARDIOLOGY | Facility: CLINIC | Age: 81
End: 2021-05-21
Attending: NURSE PRACTITIONER
Payer: COMMERCIAL

## 2021-05-21 DIAGNOSIS — R06.09 DYSPNEA ON EXERTION: ICD-10-CM

## 2021-05-21 PROCEDURE — 99207 PR STATISTIC IV PUSH SINGLE INITIAL SUBSTANCE: CPT | Performed by: STUDENT IN AN ORGANIZED HEALTH CARE EDUCATION/TRAINING PROGRAM

## 2021-05-21 PROCEDURE — 93306 TTE W/DOPPLER COMPLETE: CPT | Performed by: STUDENT IN AN ORGANIZED HEALTH CARE EDUCATION/TRAINING PROGRAM

## 2021-05-21 RX ADMIN — Medication 3 ML: at 14:10

## 2021-05-24 NOTE — RESULT ENCOUNTER NOTE
Dear Ginger,    Your recent test results are attached.      Your echocardiogram shows some mild narrowing of your mitral valve.  I do not think this is causing your shortness of breath at this time.  We can discuss further at your follow-up this week.    If you have any questions please feel free to contact [phone] or myself via [employer].    Sincerely,  Dahiana Flores, CNP

## 2021-05-26 ENCOUNTER — OFFICE VISIT (OUTPATIENT)
Dept: FAMILY MEDICINE | Facility: CLINIC | Age: 81
End: 2021-05-26
Payer: COMMERCIAL

## 2021-05-26 VITALS
HEART RATE: 91 BPM | WEIGHT: 208 LBS | TEMPERATURE: 97.6 F | SYSTOLIC BLOOD PRESSURE: 110 MMHG | HEIGHT: 65 IN | DIASTOLIC BLOOD PRESSURE: 62 MMHG | BODY MASS INDEX: 34.66 KG/M2 | OXYGEN SATURATION: 96 %

## 2021-05-26 DIAGNOSIS — R06.09 DYSPNEA ON EXERTION: Primary | ICD-10-CM

## 2021-05-26 DIAGNOSIS — I05.0 RHEUMATIC MITRAL STENOSIS: ICD-10-CM

## 2021-05-26 DIAGNOSIS — F33.1 MODERATE EPISODE OF RECURRENT MAJOR DEPRESSIVE DISORDER (H): ICD-10-CM

## 2021-05-26 PROCEDURE — 99214 OFFICE O/P EST MOD 30 MIN: CPT | Performed by: NURSE PRACTITIONER

## 2021-05-26 RX ORDER — BUSPIRONE HYDROCHLORIDE 5 MG/1
TABLET ORAL
Qty: 166 TABLET | Refills: 0 | Status: SHIPPED | OUTPATIENT
Start: 2021-05-26 | End: 2021-06-16

## 2021-05-26 ASSESSMENT — MIFFLIN-ST. JEOR: SCORE: 1424.36

## 2021-05-26 NOTE — PROGRESS NOTES
"    Assessment & Plan     Moderate episode of recurrent major depressive disorder (H)  Patient to increase buspirone as below.  - busPIRone (BUSPAR) 5 MG tablet; Take 2 tablets (10 mg) by mouth 2 times daily for 7 days, THEN 3 tablets (15 mg) 2 times daily for 23 days.    Dyspnea on exertion  Consider ENT follow-up for cough is normal.  - CT Chest w/o Contrast; Future  - General PFT Lab (Please always keep checked); Future  - Pulmonary Function Test; Future    Rheumatic mitral stenosis  Patient to repeat echo in 2-3 years.      Ordering of each unique test  Prescription drug management             Return in about 4 weeks (around 6/23/2021) for Medication Recheck- buspirone..    AKIRA Jones CNP  [facility] FELIX Miles is a 80 year old who presents for the following health issues     HPI     Medication Followup of busPIRone (BUSPAR) 5 MG tablet    Taking Medication as prescribed: yes    Side Effects:  None    Medication Helping Symptoms:  No, has not noticed a difference.      Patient continues to feel depressed.  She has not noticed any change with adding buspirone.  She denies side effects.    Patient continues to cough and have dyspnea on exertion.  Echocardiogram showed mild mitral valve stenosis, but was otherwise normal.  CXR and labs were normal as well.  Increasing pantoprazole has not helped cough.        Review of Systems   Constitutional, HEENT, cardiovascular, pulmonary, gi and gu systems are negative, except as otherwise noted.      Objective    /62   Pulse 91   Temp 97.6  F (36.4  C) (Oral)   Ht 1.651 m (5' 5\")   Wt 94.3 kg (208 lb)   SpO2 96%   BMI 34.61 kg/m    Body mass index is 34.61 kg/m .  Physical Exam   GENERAL: healthy, alert and no distress  RESP: lungs clear to auscultation - no rales, rhonchi or wheezes  CV: regular rate and rhythm, normal S1 S2, no S3 or S4, no murmur, click or rub, no peripheral edema and peripheral pulses strong  MS: " no gross musculoskeletal defects noted, no edema  PSYCH: mentation appears normal and affect flat

## 2021-05-27 NOTE — PROGRESS NOTES
"    {PROVIDER CHARTING PREFERENCE:590252}    Subjective   Ginger is a 80 year old who presents for the following health issues {ACCOMPANIED BY STATEMENT (Optional):891101}    HPI     Medication Followup of buspirone    Taking Medication as prescribed: {.:381176::\"yes\"}    Side Effects:  {NONEORCHOOSE:964191::\"None\"}    Medication Helping Symptoms:  {.:375409::\"yes\"}     {additonal problems for provider to add (Optional):490753}    Review of Systems   {ROS COMP (Optional):227114}      Objective    There were no vitals taken for this visit.  There is no height or weight on file to calculate BMI.  Physical Exam   {Exam List (Optional):337566}    {Diagnostic Test Results (Optional):531113}    {AMBULATORY ATTESTATION (Optional):051706}        "

## 2021-05-31 NOTE — ANESTHESIA CARE TRANSFER NOTE
Last vitals:   Vitals:    08/20/19 1419   BP: 144/77   Pulse: 84   Resp: 14   Temp: 36  C (96.8  F)   SpO2: 96%     Patient's level of consciousness is awake  Spontaneous respirations: yes  Maintains airway independently: yes  Dentition unchanged: yes  Oropharynx: oropharynx clear of all foreign objects    QCDR Measures:  ASA# 20 - Surgical Safety Checklist: WHO surgical safety checklist completed prior to induction    PQRS# 430 - Adult PONV Prevention: 4558F - Pt received => 2 anti-emetic agents (different classes) preop & intraop  ASA# 8 - Peds PONV Prevention: NA - Not pediatric patient, not GA or 2 or more risk factors NOT present  PQRS# 424 - Brittny-op Temp Management: 4559F - At least one body temp DOCUMENTED => 35.5C or 95.9F within required timeframe  PQRS# 426 - PACU Transfer Protocol:NA - Patient did not go to PACU  ASA# 14 - Acute Post-op Pain: NA - Patient under age 10y or did not go to PACU

## 2021-05-31 NOTE — ANESTHESIA PREPROCEDURE EVALUATION
Anesthesia Evaluation      Patient summary reviewed     Airway   Mallampati: II   Pulmonary - normal exam   (+) sleep apnea on no CPAP, ,                          Cardiovascular - negative ROS and normal exam  ECG reviewed        Neuro/Psych - negative ROS     Endo/Other    (+) arthritis, obesity,      Comments: Rheumatoid arthritis    GI/Hepatic/Renal    (+) GERD well controlled,        Other findings: Hb 12.0      Dental - normal exam                        Anesthesia Plan  Planned anesthetic: general mask  Supraclavicular block  ASA 3   Induction: intravenous   Anesthetic plan and risks discussed with: patient    Post-op plan: routine recovery

## 2021-05-31 NOTE — ANESTHESIA POSTPROCEDURE EVALUATION
Patient: Ginger Marshall  #1  DISTAL ULNA RESECTION, RIGHT MIDDLE SILASTIC METACARPALPHALANGEAL EXCHANGE AND RIGHT ELBOW NODULE EXCISION.  Anesthesia type: general    Patient location: Phase II Recovery  Last vitals:   Vitals Value Taken Time   /69 8/20/2019  2:46 PM   Temp 36  C (96.8  F) 8/20/2019  2:19 PM   Pulse 70 8/20/2019  2:55 PM   Resp 16 8/20/2019  2:20 PM   SpO2 95 % 8/20/2019  2:55 PM   Vitals shown include unvalidated device data.  Post vital signs: stable  Level of consciousness: awake and responds to simple questions  Post-anesthesia pain: pain controlled  Post-anesthesia nausea and vomiting: no  Pulmonary: unassisted, return to baseline  Cardiovascular: stable and blood pressure at baseline  Hydration: adequate  Anesthetic events: no    QCDR Measures:  ASA# 11 - Brittny-op Cardiac Arrest: ASA11B - Patient did NOT experience unanticipated cardiac arrest  ASA# 12 - Brittny-op Mortality Rate: ASA12B - Patient did NOT die  ASA# 13 - PACU Re-Intubation Rate: ASA13B - Patient did NOT require a new airway mgmt  ASA# 10 - Composite Anes Safety: ASA10A - No serious adverse event    Additional Notes:

## 2021-05-31 NOTE — ANESTHESIA PROCEDURE NOTES
Peripheral Block    Patient location during procedure: pre-op  Start time: 8/20/2019 11:13 AM  End time: 8/20/2019 11:18 AM  post-op analgesia per surgeon order as noted in medical record  Staffing:  Performing  Anesthesiologist: Himanshu Mcclelland MD  Preanesthetic Checklist  Completed: patient identified, site marked, risks, benefits, and alternatives discussed, timeout performed, consent obtained, airway assessed, oxygen available, suction available, emergency drugs available and hand hygiene performed  Peripheral Block  Block type: brachial plexus, supraclavicular  Prep: ChloraPrep  Patient position: sitting  Patient monitoring: cardiac monitor, continuous pulse oximetry, blood pressure and heart rate  Laterality: right  Injection technique: ultrasound guided    Ultrasound used to visualize needle placement in proximity to nerve being blocked: yes   Permanent ultrasound image captured for medical record  Sterile gel and probe cover used for ultrasound.    Needle  Needle type: Stimuplex   Needle gauge: 20G  Needle length: 4 in    Assessment  Injection assessment: no difficulty with injection, negative aspiration for heme, no paresthesia on injection and incremental injection

## 2021-06-01 ENCOUNTER — ANCILLARY PROCEDURE (OUTPATIENT)
Dept: CT IMAGING | Facility: CLINIC | Age: 81
End: 2021-06-01
Attending: NURSE PRACTITIONER
Payer: COMMERCIAL

## 2021-06-01 DIAGNOSIS — R06.09 DYSPNEA ON EXERTION: ICD-10-CM

## 2021-06-01 PROCEDURE — 71250 CT THORAX DX C-: CPT | Mod: TC | Performed by: RADIOLOGY

## 2021-06-01 NOTE — RESULT ENCOUNTER NOTE
Dear Ginger,    Your recent test results are attached.      No acute findings to cause cough on Chest CT.    If you have any questions please feel free to contact [phone] or myself via [employer].    Sincerely,  Dahiana Flores, CNP

## 2021-06-03 VITALS — BODY MASS INDEX: 31.42 KG/M2 | HEIGHT: 67 IN | WEIGHT: 200.2 LBS

## 2021-06-07 ENCOUNTER — TRANSFERRED RECORDS (OUTPATIENT)
Dept: HEALTH INFORMATION MANAGEMENT | Facility: CLINIC | Age: 81
End: 2021-06-07

## 2021-06-15 DIAGNOSIS — F33.1 MODERATE EPISODE OF RECURRENT MAJOR DEPRESSIVE DISORDER (H): ICD-10-CM

## 2021-06-15 DIAGNOSIS — E66.09 NON MORBID OBESITY DUE TO EXCESS CALORIES: ICD-10-CM

## 2021-06-15 DIAGNOSIS — M05.79 RHEUMATOID ARTHRITIS INVOLVING MULTIPLE SITES WITH POSITIVE RHEUMATOID FACTOR (H): ICD-10-CM

## 2021-06-16 RX ORDER — TOPIRAMATE 50 MG/1
TABLET, FILM COATED ORAL
Qty: 180 TABLET | Refills: 11 | Status: SHIPPED | OUTPATIENT
Start: 2021-06-16 | End: 2021-06-22

## 2021-06-16 RX ORDER — BUSPIRONE HYDROCHLORIDE 5 MG/1
TABLET ORAL
Qty: 166 TABLET | Refills: 0 | Status: SHIPPED | OUTPATIENT
Start: 2021-06-16 | End: 2021-06-22 | Stop reason: SINTOL

## 2021-06-16 NOTE — TELEPHONE ENCOUNTER
Routing refill request to provider for review/approval because:  PHQ    PHQ-2 Score:     PHQ-2 ( 1999 Pfizer) 11/27/2020 4/10/2020   Q1: Little interest or pleasure in doing things 1 0   Q2: Feeling down, depressed or hopeless 0 0   PHQ-2 Score 1 0   Q1: Little interest or pleasure in doing things Several days -   Q2: Feeling down, depressed or hopeless Not at all -   PHQ-2 Score 1 -             Pending Prescriptions:                       Disp   Refills    busPIRone (BUSPAR) 5 MG tablet [Pharmacy M*166 ta*0        Sig: Take 2 tablets (10 mg) by mouth 2 times daily for 7           days, THEN 3 tablets (15 mg) 2 times daily for 23           days.        Jann Sidhu RN

## 2021-06-16 NOTE — TELEPHONE ENCOUNTER
Routing refill request to provider for review/approval because:  Labs:  Cbc  Provider's last note needs review    Lab Results   Component Value Date    WBC 5.0 05/10/2021     Lab Results   Component Value Date    RBC 3.39 05/10/2021     Lab Results   Component Value Date    HGB 11.3 05/10/2021     Lab Results   Component Value Date    HCT 35.6 05/10/2021     No components found for: MCT  Lab Results   Component Value Date     05/10/2021     Lab Results   Component Value Date    MCH 33.3 05/10/2021     Lab Results   Component Value Date    MCHC 31.7 05/10/2021     Lab Results   Component Value Date    RDW 14.3 05/10/2021     Lab Results   Component Value Date     05/10/2021               Pending Prescriptions:                       Disp   Refills    topiramate (TOPAMAX) 50 MG tablet [Pharmac*180 ta*11       Sig: TAKE ONE TABLET BY MOUTH TWICE A DAY        Jann Sidhu RN

## 2021-06-17 DIAGNOSIS — K21.9 GASTROESOPHAGEAL REFLUX DISEASE WITHOUT ESOPHAGITIS: ICD-10-CM

## 2021-06-17 RX ORDER — SUCRALFATE 1 G/1
TABLET ORAL
Qty: 120 TABLET | Refills: 1 | Status: ON HOLD | OUTPATIENT
Start: 2021-06-17 | End: 2023-02-15

## 2021-06-18 ENCOUNTER — OFFICE VISIT (OUTPATIENT)
Dept: RHEUMATOLOGY | Facility: CLINIC | Age: 81
End: 2021-06-18
Payer: COMMERCIAL

## 2021-06-18 VITALS
HEIGHT: 65 IN | WEIGHT: 209.4 LBS | SYSTOLIC BLOOD PRESSURE: 123 MMHG | OXYGEN SATURATION: 99 % | DIASTOLIC BLOOD PRESSURE: 73 MMHG | BODY MASS INDEX: 34.89 KG/M2 | HEART RATE: 97 BPM

## 2021-06-18 DIAGNOSIS — M05.79 RHEUMATOID ARTHRITIS INVOLVING MULTIPLE SITES WITH POSITIVE RHEUMATOID FACTOR (H): Primary | Chronic | ICD-10-CM

## 2021-06-18 DIAGNOSIS — Z79.899 HIGH RISK MEDICATION USE: ICD-10-CM

## 2021-06-18 DIAGNOSIS — M81.0 OSTEOPOROSIS WITHOUT CURRENT PATHOLOGICAL FRACTURE, UNSPECIFIED OSTEOPOROSIS TYPE: ICD-10-CM

## 2021-06-18 PROCEDURE — 99214 OFFICE O/P EST MOD 30 MIN: CPT | Performed by: INTERNAL MEDICINE

## 2021-06-18 RX ORDER — METHOTREXATE 25 MG/.5ML
25 INJECTION, SOLUTION SUBCUTANEOUS
Qty: 2 ML | Refills: 4 | Status: SHIPPED | OUTPATIENT
Start: 2021-06-18 | End: 2021-12-21

## 2021-06-18 RX ORDER — LEUCOVORIN CALCIUM 5 MG/1
5 TABLET ORAL
Qty: 13 TABLET | Refills: 1 | Status: SHIPPED | OUTPATIENT
Start: 2021-06-18 | End: 2021-10-13

## 2021-06-18 RX ORDER — TOFACITINIB 11 MG/1
11 TABLET, FILM COATED, EXTENDED RELEASE ORAL DAILY
Qty: 90 TABLET | Refills: 2 | Status: SHIPPED | OUTPATIENT
Start: 2021-06-18 | End: 2021-10-13

## 2021-06-18 ASSESSMENT — MIFFLIN-ST. JEOR: SCORE: 1425.71

## 2021-06-18 ASSESSMENT — PAIN SCALES - GENERAL: PAINLEVEL: EXTREME PAIN (8)

## 2021-06-18 NOTE — NURSING NOTE
RAPID3 (0-30) Cumulative Score  17.3          RAPID3 Weighted Score (divide #4 by 3 and that is the weighted score)  5.77

## 2021-06-18 NOTE — PROGRESS NOTES
Rheumatology Clinic Visit      Ginger Marshall MRN# [id number]   YOB: 1940 Age: 81 year old      Date of visit: 6/18/21   PCP: Dahiana Flores     Chief Complaint   Patient presents with:  Rheumatoid Arthritis    Assessment and Plan     1. Seropositive ( [2009], CCP >100 [2009]; hx of rheumatoid nodule by 6/14/2011 pathology) Erosive Rheumatoid Arthritis: Previously failed remicade (staph infection during therapy, but was immediately after a joint injection), orencia (migraines), HCQ (ineffective).  Sulfa allergy.  Currently on methotrexate 25 mg SQ once weekly (dose reduction in the past resulted in more symptoms), folic acid 800 mcg daily, Xeljanz XR 11mg daily.  She also has prednisone 20 mg daily ×5 days to use as needed for flare.  RA appears well controlled at this time.  At the previous visit, leucovorin was added because of a chronic nasal sore and the nasal sore has resolved.  Therefore, continue leucovorin and discontinue daily folic acid.   Chronic illness, stable.    - Continue methotrexate from 25mg SQ once weekly  - Discontinue folic acid from 800mcg daily to 1600mcg daily  - Continue leucovorin 5 mg every 7 days, 24 hours after MTX dose.   - Continue Xeljanz XR 11mg daily  - Labs in August:  CBC, Creatinine, Hepatic Panel, ESR, CRP    High risk medication requiring intensive toxicity monitoring at least quarterly: labs ordered include CBC, Creatinine, Hepatic panel to monitor for cytopenia and hepatotoxicity; checking creatinine as it affects clearance of medication.        2. 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 Following with podiatry.     3. Right hip pain: Status post WALTER twice in the past. Followed by [facility] orthopedics.     4. 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 Michele who is  now at [facility].  Now following with both Dr. Michele and [facility] with plans for follow-up at the pain clinic next week.  This is the source of most of her pain at this time.      5. Osteoporosis: was previously managed by endocrinology and she received reclast once in 2013, 2014, 2016. 12/12/2018 DEXA ordered by Dr. Vázquez showed osteoporosis.  Again discussed the treatment options for osteoporosis, risks associated with the treatment, and the risk associated with not treating.  I recommended that she treat her osteoporosis but she has refused.  She verbalized understanding about the risks of not treating osteoporosis, and commented that Dr. Vázquez has told her the same thing. After another discussion she decided that the should like to proceed with reclast and was received on 3/18/2021  - Reclast once yearly; last received 3/18/2021  - Continue vitamin D 1000 IU daily  - Continue calcium 1200mg daily                   6. Left 1st toe pain, history: 2 episodes of sudden onset left toe pain followed by diffuse left foot pain that made ambulation difficult.  Also with nodules over both Achilles tendons and the right elbow that are either rheumatoid nodules or tophi.  She reports having history of gout decades ago.  Denies ever being on colchicine or allopurinol.  Discussed colchicine to use if suspected gout flare occurs.  No flares since last seen so has not used colchicine.  - Colchicine: (MITIGARE) 0.6 MG capsule; Take 2 capsules (1.2 mg) by mouth once for 1 dose at the onset of a gout flare, followed by 1 capsule (0.6mg) 1 hour later.      7. Chronic pain syndrome: diffusely tender to palpation but no active arthritis on exam. Non-joint areas are tender to palpation. Advised that she discuss with her pain management provider who she plans to see next week.     - COVID-19: has received the Pfizer COVID-19 vaccine on 2/9/2021 and 3/3/2021    Total minutes spent in evaluation with patient,  documentation, , and review of pertinent studies and chart notes: 28     Ms. Marshall verbalized agreement with and understanding of the rational for the diagnosis and treatment plan.  All questions were answered to best of my ability and the patient's satisfaction. Ms. Marshall was advised to contact the clinic with any questions that may arise after the clinic visit.      Thank you for involving me in the care of the patient    Return to clinic: 3-4 months    HPI   Ginger Marshall is a 81 year old female with a history of multiple foot surgeries, hand tendon transfers, MCP replacements (most recent being in August 2015), bilateral TKA, right WALTER, left distal radius fracture history, gout, s/p lumbar fusion, osteoporosis and seropositive (RF+, CCP+) erosive rheumatoid arthritis who presents for follow-up of rheumatoid arthritis.      Today, 6/18/2021: doing well with regard to RA.  No joint swelling.  Morning stiffness for <30 min. Tolerating medications well. Nasal sore resolved with using leucovorin.  Chronic low back pain radiating down her leg; she has followed up with her spine surgeon and is now seeing a pain clinic for this.  Diffuse body pain    Denies fevers, chills, nausea, vomiting, constipation, diarrhea. No abdominal pain. No chest pain/pressure, palpitations, or shortness of breath. No oral or nasal sores.   No rash. No LE swelling.     Tobacco: quit in 1999  EtOH: 1 glass of wine every month at most  Drugs: None  Occupation: , retired     ROS   12 point review of system was completed and negative except as noted in the HPI     Active Problem List     Patient Active Problem List   Diagnosis     Rheumatoid arthritis involving right hand with positive rheumatoid factor (H)     History of colonic polyps     Multinodular goiter     CARDIOVASCULAR SCREENING; LDL GOAL LESS THAN 130     Pulmonary nodule     PSEUDOPHAKIA OU     PVD (POSTERIOR VITREOUS DETACHMENT) OU     PXF  (PSEUDOEXFOLIATION OF LENS CAPSULE) OD     GERD (gastroesophageal reflux disease)     Hyperlipidemia LDL goal <100     Advance Care Planning     Neuropathy     SHERRY (obstructive sleep apnea)-severe (AHI 35)     zEncounter for counseling     Anemia of chronic disease     Osteoporosis     Cornea guttata, ou     Conjunctival concretions     Stenosis, spinal, lumbar     Chronic pain     Class 1 obesity due to excess calories with serious comorbidity and body mass index (BMI) of 31.0 to 31.9 in adult     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     MGD (meibomian gland dysfunction)     Blepharitis of both eyes     Personal history of healed osteoporosis fracture     Iron malabsorption     Hyperglycemia     Chronic bilateral low back pain without sciatica     Allergic state, subsequent encounter     Anxiety     Low iron     History of depression     DDD (degenerative disc disease), lumbar     Chronic right shoulder pain     Moderate episode of recurrent major depressive disorder (H)     Rheumatic mitral stenosis     Past Medical History     Past Medical History:   Diagnosis Date     Acute posthemorrhagic anemia 10/13/2012     Closed fracture of multiple ribs of left side, initial encounter 11/25/2019     Ex-smoker 01/1999     Gastroenteritis 03/21/2020    Gastroenteritis with norovirus     Herniated nucleus pulposus, L3-4 3/1/2017     Hip joint replacement by other means 07/10/2008     History of blood transfusion      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     History of total knee replacement 07/23/2009     Menopause 1989    late 40's     Migraine 04/27/2014    resolved     Other chronic pain     joints     Pelvic fracture (H) 05/13/2014     Rheumatoid arteritis (H)      S/P lumbar fusion 04/03/2017     Sleep apnea      Vitamin B12 deficiency      Past Surgical History     Past Surgical History:   Procedure Laterality Date     ARTHROSCOPY KNEE RT/LT  01/2006    left     BACK SURGERY  2013    disc     BREAST  "SURGERY      lumpectomy 's     C ANESTH,TOTAL HIP ARTHROPLASTY      Rt hip     C HAND/FINGER SURGERY UNLISTED  2005    right hand     C TOTAL KNEE ARTHROPLASTY      left     CATARACT IOL, RT/LT Bilateral 2010 aproximately      SECTION  1966      SECTION  1972     COLONOSCOPY  2009,     HC ESOPH/GAS REFLUX TEST W NASAL IMPED >1 HR  2012    Procedure:ESOPHAGEAL IMPEDENCE FUNCTION TEST WITH 24 HOUR PH GREATER THAN 1 HOUR; Surgeon:KAYKAY JULIO; Location:[facility]     IR TRANSLAMINAR EPIDURAL LUMBAR INJ INCL IMAGING  2012    LESI L5-S1 at [facility]     OPTICAL TRACKING SYSTEM FUSION POSTERIOR SPINE LUMBAR N/A 2017    Procedure: OPTICAL TRACKING SYSTEM FUSION SPINE POSTERIOR LUMBAR ONE LEVEL;  Surgeon: Anthony Michele MD;  Location: [facility]     ORTHOPEDIC SURGERY      left foot surgery     ORTHOPEDIC SURGERY      R MCP surgery     ORTHOPEDIC SURGERY      right knee total replacement     Allergy     Allergies   Allergen Reactions     Abatacept Other (See Comments)     Severe headaches  Migraine     Celebrex [Celecoxib]      Ineffective     Celecoxib Unknown and Nausea     Ethanol      Antihistamines     Orencia [Abatacept]      Headache     Septra [Bactrim]      Sulfa Drugs Nausea and Vomiting     \"deathly ill\"  Allergic to everything with Sulfa in it.     Sulfamethoxazole-Trimethoprim Nausea and Nausea and Vomiting     Tramadol Other (See Comments)     Headache  Migraine headache     Valdecoxib Unknown and Nausea     Made me \"very very ill\", might of been \"cramping\"     Adhesive Tape Rash     Plastic tape  Plastic tapes     Antihistamines, Chlorpheniramine-Type  [Alkylamines] Anxiety and Other (See Comments)     Current Medication List     Current Outpatient Medications   Medication Sig     acetaminophen (TYLENOL) 500 MG tablet Take 2 tablets (1,000 mg) by mouth every 8 hours as needed for pain     atorvastatin (LIPITOR) 40 MG " tablet Take 1 tablet (40 mg) by mouth daily     busPIRone (BUSPAR) 5 MG tablet TAKE 2 TABLETS (10 MG) BY MOUTH 2 TIMES DAILY FOR 7 DAYS, THEN 3 TABLETS (15 MG) 2 TIMES DAILY FOR 23 DAYS.     Cholecalciferol (VITAMIN D-3 PO) Take 1,000 Units by mouth 2 times daily     desvenlafaxine (PRISTIQ) 100 MG 24 hr tablet TAKE ONE TABLET BY MOUTH ONCE DAILY     Ferrous Sulfate 324 (65 Fe) MG TBEC Take 324 mg by mouth daily     folic acid 800 MCG tablet Take 2 tablets (1,600 mcg) by mouth daily     leucovorin (WELLCOVORIN) 5 MG tablet Take 1 tablet (5 mg) by mouth every 7 days . Take 24 hours after taking Methotrexate each week.     Methotrexate, PF, (RASUVO) 25 MG/0.5ML autoinjector Inject 0.5 mLs (25 mg) Subcutaneous every 7 days . Hold for signs of infection, and seek medical attention.     methylPREDNISolone (MEDROL DOSEPAK) 4 MG tablet therapy pack      naproxen sodium (ALEVE) 220 MG capsule Take 220 mg by mouth as needed     pantoprazole (PROTONIX) 40 MG EC tablet TAKE ONE TABLET BY MOUTH ONCE DAILY 30-60 MINUTES BEFORE A MEAL     sucralfate (CARAFATE) 1 GM tablet TAKE ONE TABLET BY MOUTH FOUR TIMES A DAY AS NEEDED HEATBURN     tiZANidine (ZANAFLEX) 2 MG tablet Take 0.5-1 tablets (1-2 mg) by mouth 3 times daily as needed for muscle spasms     tofacitinib (XELJANZ XR) 11 MG 24 hr tablet Take 1 tablet (11 mg) by mouth daily     topiramate (TOPAMAX) 50 MG tablet TAKE ONE TABLET BY MOUTH TWICE A DAY     valACYclovir (VALTREX) 500 MG tablet Take 1 tablet (500 mg) by mouth 2 times daily (Patient not taking: Reported on 2021)     No current facility-administered medications for this visit.      Social History   See HPI    Family History     Family History   Problem Relation Age of Onset     Arthritis Mother      Alzheimer Disease Mother      Hyperlipidemia Mother      Osteoporosis Mother      Heart Disease Father         MI ( from this)     Alcohol/Drug Father      Arthritis Sister      Hypertension Sister      Cancer  "Son      Diabetes Son      Neurologic Disorder Sister         Schizophrenic     Hypertension Sister      Hyperlipidemia Sister      Mental Illness Sister      Diabetes Son      Other Cancer Son      Glaucoma Daughter      No change in family history since the previous clinic visit.    Physical Exam     Temp Readings from Last 3 Encounters:   05/26/21 97.6  F (36.4  C) (Oral)   05/10/21 97.6  F (36.4  C) (Oral)   03/18/21 97.9  F (36.6  C) (Oral)     BP Readings from Last 5 Encounters:   05/26/21 110/62   05/10/21 119/71   03/18/21 128/66   03/11/21 122/76   11/27/20 112/76     Pulse Readings from Last 1 Encounters:   05/26/21 91     Resp Readings from Last 1 Encounters:   05/10/21 17     Estimated body mass index is 34.61 kg/m  as calculated from the following:    Height as of 5/26/21: 1.651 m (5' 5\").    Weight as of 5/26/21: 94.3 kg (208 lb).    GEN: NAD. Healthy appearing adult.   HEENT:  Anicteric, noninjected sclera. No obvious external lesions of the ear and nose. Hearing intact.  PULM: No increased work of breathing  MSK: MCPs, PIPs, DIPs without swelling or tenderness to palpation.  Wrists without swelling or tenderness to palpation.  Elbows and shoulders without swelling or tenderness to palpation.  Shoulders with normal range of motion.  Knees, ankles, and MTPs without swelling or tenderness to palpation.  16 fibromyalgia tender points positive.    SKIN: No rash or jaundice seen  PSYCH: Alert. Appropriate.      Labs     CBC  Recent Labs   Lab Test 05/10/21  1023 02/16/21  1019 11/16/20  1028   WBC 5.0 5.7 5.7   RBC 3.39* 3.81 3.91   HGB 11.3* 12.5 12.0   HCT 35.6 38.9 37.8   * 102* 97   RDW 14.3 15.2* 16.1*    335 361   MCH 33.3* 32.8 30.7   MCHC 31.7 32.1 31.7   NEUTROPHIL 57.7 65.8 60.5   LYMPH 18.5 17.0 20.4   MONOCYTE 17.3 12.1 12.8   EOSINOPHIL 6.3 4.9 6.1   BASOPHIL 0.2 0.2 0.2   ANEU 2.9 3.8 3.5   ALYM 0.9 1.0 1.2   MARYURI 0.9 0.7 0.7   AEOS 0.3 0.3 0.4   ABAS 0.0 0.0 0.0     CMP  Recent " Labs   Lab Test 05/10/21  1023 03/18/21  1350 02/16/21  1019 12/18/20  0923 11/16/20  1028 03/27/20  1413 03/27/20  1413 12/19/19  0957 12/19/19  0957 08/15/19  1118 08/15/19  1118   NA  --   --   --   --   --   --  140  --  142  --  144   POTASSIUM  --   --   --   --   --   --  3.4  --  3.8  --  3.5   CHLORIDE  --   --   --   --   --   --  112*  --  111*  --  112*   CO2  --   --   --   --   --   --  24  --  24  --  25   ANIONGAP  --   --   --   --   --   --  4  --  7  --  7   GLC  --   --   --   --   --   --  98  --  88  --  95   BUN  --   --   --   --   --   --  18  --  10  --  16   CR 0.59  --  0.62  --  0.60   < > 0.57   < > 0.68   < > 0.57   GFRESTIMATED 87  --  86  --  87   < > 89   < > 84   < > 89   GFRESTBLACK >90  --  >90  --  >90   < > >90   < > >90   < > >90   BRANDEE  --  9.0  --  9.6  --   --  9.1  --  9.4  --  9.6   BILITOTAL 0.3  --  0.4  --  0.4   < >  --    < > 0.3   < >  --    ALBUMIN 3.6  --  4.2  --  4.1   < >  --    < > 4.0   < >  --    PROTTOTAL 7.0  --  8.0  --  7.6   < >  --    < > 7.6   < >  --    ALKPHOS 45  --  44  --  47   < >  --    < > 45   < >  --    AST 22  --  21  --  25   < >  --    < > 19   < >  --    ALT 36  --  34  --  36   < >  --    < > 22   < >  --     < > = values in this interval not displayed.     Calcium/VitaminD  Recent Labs   Lab Test 03/18/21  1350 12/18/20  0923 03/27/20  1413 03/06/19  1351 03/06/19  1351 10/28/16  1239 10/28/16  1239   BRANDEE 9.0 9.6 9.1   < > 9.3   < > 9.4   VITDT  --  48  --   --  41  --  37    < > = values in this interval not displayed.     ESR/CRP  Recent Labs   Lab Test 05/10/21  1023 02/16/21  1019 11/16/20  1028   SED 32* 20 18   CRP <2.9 <2.9 <2.9     Lipid Panel  Recent Labs   Lab Test 11/27/20  1043 12/19/19  0957 11/23/18  1309 12/14/17  0957 01/13/14  1109 01/13/14  1109 07/24/13  1024   CHOL 188 166  --  164   < > 135 177   TRIG 142 113  --  121   < > 89 80   HDL 89 89  --  79   < > 49* 89   LDL 71 54 72 61   < > 68 72   VLDL  --   --   --    "--   --  18 16   CHOLHDLRATIO  --   --   --   --   --  2.8 2.0   NHDL 99 77  --  85   < >  --   --     < > = values in this interval not displayed.     Hepatitis B  Recent Labs   Lab Test 09/15/15  1159   AUSAB 0.11   HBCAB Nonreactive   HEPBANG Nonreactive     Hepatitis C  Recent Labs   Lab Test 09/15/15  1159   HCVAB Nonreactive   Assay performance characteristics have not been established for newborns,   infants, and children       Tuberculosis Screening  Recent Labs   Lab Test 05/07/18  1033 09/15/15  1200   TBRSLT Negative Negative   TBAGN 0.00 0.00       \"HAND BILATERAL THREE OR MORE VIEWS 9/15/2015 12:28 PM   HISTORY: Rheumatoid arthritis; establish baseline. Rheumatoid  arthritis(714.0)  COMPARISON: None  IMPRESSION  IMPRESSION: There is diffuse osteopenia. Postoperative changes of the  right second and third metacarpophalangeal joints. Moderate joint  space narrowing involving the left second and third  metacarpophalangeal joints. Mild joint space narrowing of the right  fourth and fifth metacarpophalangeal joints. There are also small  periarticular erosive changes of the metacarpophalangeal joints. There  is fusion of several of the right carpal bones. Marked joint space  loss in the left wrist. Findings are consistent with the clinical  history of rheumatoid arthritis. Chronic fracture deformities of the  right distal radius and ulna. No acute fracture is seen.  SPENCER MONSALVE MD\"    Immunization History     Immunization History   Administered Date(s) Administered     COVID-19,PF,Pfizer 02/09/2021, 03/03/2021     FLU 6-35 months 10/24/2006, 11/14/2007, 10/22/2008, 10/21/2009     Flu, Unspecified 10/20/2013     Influenza (High Dose) 3 valent vaccine 10/28/2013, 09/23/2014, 11/11/2015, 09/23/2016, 09/24/2019     Influenza (IIV3) PF 10/12/1999, 10/26/2001, 10/19/2002, 10/30/2003, 10/28/2004, 10/21/2005, 10/26/2007, 10/21/2009, 10/05/2011, 10/13/2012     Influenza Vaccine IM > 6 months Valent IIV4 09/24/2020 "     Influenza Vaccine Im 4yrs+ 4 Valent CCIIV4 09/28/2017, 09/27/2018     Mantoux Tuberculin Skin Test 11/03/2006     Pneumo Conj 13-V (2010&after) 07/29/2015     Pneumococcal 23 valent 06/26/2000, 10/26/2001, 06/01/2007, 06/02/2010     TD (ADULT, 7+) 12/10/2008, 07/20/2009     Tdap (Adult) Unspecified Formulation 12/12/2018     Zoster vaccine recombinant adjuvanted (SHINGRIX) 12/12/2018, 02/14/2019          Chart documentation done in part with Dragon Voice recognition Software. Although reviewed after completion, some word and grammatical error may remain.    Nico Byers MD

## 2021-06-18 NOTE — LETTER
[facility]  [address]  [place] [address]  Phone: [phone]  Fax: [phone]       June 18, 2021    Ginger Marshall                                                                                                                                                       [address]   [facility] [address]    To Whom It May Concern:    Ginger Marshall is followed at the [facility]. Due to her rheumatologic condition she would benefit from having a faucet handle that does not require a twisting motion; please consider a single handle faucet that does not require much force to operate.     Sincerely,        Nico Byers MD   Rheumatology

## 2021-06-22 ENCOUNTER — OFFICE VISIT (OUTPATIENT)
Dept: FAMILY MEDICINE | Facility: CLINIC | Age: 81
End: 2021-06-22
Payer: COMMERCIAL

## 2021-06-22 VITALS
HEART RATE: 97 BPM | OXYGEN SATURATION: 95 % | SYSTOLIC BLOOD PRESSURE: 112 MMHG | WEIGHT: 208.2 LBS | DIASTOLIC BLOOD PRESSURE: 71 MMHG | HEIGHT: 65 IN | TEMPERATURE: 98.2 F | BODY MASS INDEX: 34.69 KG/M2 | RESPIRATION RATE: 17 BRPM

## 2021-06-22 DIAGNOSIS — M05.79 RHEUMATOID ARTHRITIS INVOLVING MULTIPLE SITES WITH POSITIVE RHEUMATOID FACTOR (H): Primary | ICD-10-CM

## 2021-06-22 DIAGNOSIS — E66.09 NON MORBID OBESITY DUE TO EXCESS CALORIES: ICD-10-CM

## 2021-06-22 DIAGNOSIS — R06.09 DYSPNEA ON EXERTION: ICD-10-CM

## 2021-06-22 DIAGNOSIS — F33.1 MODERATE EPISODE OF RECURRENT MAJOR DEPRESSIVE DISORDER (H): ICD-10-CM

## 2021-06-22 PROCEDURE — 99214 OFFICE O/P EST MOD 30 MIN: CPT | Performed by: NURSE PRACTITIONER

## 2021-06-22 RX ORDER — TOPIRAMATE 50 MG/1
75 TABLET, FILM COATED ORAL 2 TIMES DAILY
Qty: 270 TABLET | Refills: 11 | Status: SHIPPED | OUTPATIENT
Start: 2021-06-22 | End: 2022-10-11

## 2021-06-22 ASSESSMENT — PAIN SCALES - GENERAL: PAINLEVEL: MODERATE PAIN (4)

## 2021-06-22 ASSESSMENT — MIFFLIN-ST. JEOR: SCORE: 1420.27

## 2021-06-22 ASSESSMENT — PATIENT HEALTH QUESTIONNAIRE - PHQ9: SUM OF ALL RESPONSES TO PHQ QUESTIONS 1-9: 12

## 2021-06-22 NOTE — PATIENT INSTRUCTIONS
Increase topiramate to 75 mg (1.5 tablets) at bedtime and 50 mg in the morning for 1 week, then 75 mg twice daily.    Okay to stop buspirone.

## 2021-06-22 NOTE — PROGRESS NOTES
Assessment & Plan     Rheumatoid arthritis involving multiple sites with positive rheumatoid factor (H)  Patient to increase topiramate to help pain.  - topiramate (TOPAMAX) 50 MG tablet; Take 1.5 tablets (75 mg) by mouth 2 times daily    Obesity    - topiramate (TOPAMAX) 50 MG tablet; Take 1.5 tablets (75 mg) by mouth 2 times daily    Dyspnea on exertion  Cough persists.  Patient to schedule PFT's and if normal, follow-up with ENT for further eval.  - General PFT Lab (Please always keep checked); Future  - Pulmonary Function Test; Future    Moderate episode of recurrent major depressive disorder (H)  Patient to stop buspirone.  Will focus on improving pain and sleep to help mood.      Ordering of each unique test  Prescription drug management             Return in about 5 months (around 2021) for Physical Exam.    AKIRA Jones CNP  [facility] FELIX Miles is a 81 year old who presents for the following health issues   HPI   Hyperlipidemia Follow-Up    Are you regularly taking any medication or supplement to lower your cholesterol?   Yes- Atorvastatin    Are you having muscle aches or other side effects that you think could be caused by your cholesterol lowering medication?  No but lots of aches and pains but unsure from what     Depression Followup    How are you doing with your depression since your last visit? No change    Are you having other symptoms that might be associated with depression? No    Have you had a significant life event?  No     Are you feeling anxious or having panic attacks?   No    Do you have any concerns with your use of alcohol or other drugs? No    Social History     Tobacco Use     Smoking status: Former Smoker     Packs/day: 1.00     Years: 41.00     Pack years: 41.00     Types: Cigarettes     Quit date: 1999     Years since quittin.4     Smokeless tobacco: Never Used     Tobacco comment: former smoker   Substance Use Topics      "Alcohol use: Never     Alcohol/week: 0.0 standard drinks     Frequency: Never     Drug use: No     PHQ 11/27/2020 5/10/2021 6/22/2021   PHQ-9 Total Score 3 5 12   Q9: Thoughts of better off dead/self-harm past 2 weeks Not at all Not at all Not at all     SPRING-7 SCORE 2/22/2018 6/5/2019 6/16/2020   Total Score 0 0 7         Suicide Assessment Five-step Evaluation and Treatment (SAFE-T)        How many servings of fruits and vegetables do you eat daily?  0-1    On average, how many sweetened beverages do you drink each day (Examples: soda, juice, sweet tea, etc.  Do NOT count diet or artificially sweetened beverages)?   0    How many days per week do you exercise enough to make your heart beat faster? 3 or less takes dog for walk 15 minutes     How many minutes a day do you exercise enough to make your heart beat faster? 10 - 19    How many days per week do you miss taking your medication? 0      Patient notes back pain and left leg pain that is chronic.  She was told by Rheumatology and Neurosurgery that it was not related to arthritis or her back.  She is seeing [facility] this week for pain.  Pain is worse with lying down and can't sleep.  She feels this impacts her mood.  Patient has not noticed any difference in buspirone.  She feels her \"head feels weird\".  She would like to stop medication.      Review of Systems   Constitutional, HEENT, cardiovascular, pulmonary, gi and gu systems are negative, except as otherwise noted.      Objective    /71   Pulse 97   Temp 98.2  F (36.8  C) (Oral)   Resp 17   Ht 1.651 m (5' 5\")   Wt 94.4 kg (208 lb 3.2 oz)   SpO2 95%   BMI 34.65 kg/m    Body mass index is 34.65 kg/m .  Physical Exam   GENERAL: healthy, alert and no distress  RESP: lungs clear to auscultation - no rales, rhonchi or wheezes  CV: regular rate and rhythm, normal S1 S2, no S3 or S4, no murmur, click or rub, no peripheral edema and peripheral pulses strong  MS: no gross musculoskeletal defects noted, no " edema  PSYCH: mentation appears normal, affect normal/bright

## 2021-06-23 ENCOUNTER — DOCUMENTATION ONLY (OUTPATIENT)
Dept: SLEEP MEDICINE | Facility: CLINIC | Age: 81
End: 2021-06-23

## 2021-06-23 NOTE — PROGRESS NOTES
6 month [facility]    STM Recheck Visit     Diagnostic AHI: 35.5   PSG    Data only recheck     Assessment: Pt meeting objective benchmarks.     Action plan:   pt to follow up per provider request       Device type: Auto-CPAP  PAP settings: CPAP min 10.0 cm  H20     CPAP max 15.0 cm  H20    95th% pressure 13.3 cm  H20   Objective measures: 14 day rolling measures      Compliance  85 %      Leak  25.28 lpm  last  upload      AHI 5.41   last  upload      Average number of minutes 405      Objective measure goal  Compliance   Goal >70%  Leak   Goal < 24 lpm  AHI  Goal < 5  Usage  Goal >240    Total time spent on accessing, reviewing and interpreting remote patient PAP therapy data:   0 minutes      Total time spent with direct patient communication :   0 minutes

## 2021-06-24 ENCOUNTER — TRANSFERRED RECORDS (OUTPATIENT)
Dept: HEALTH INFORMATION MANAGEMENT | Facility: CLINIC | Age: 81
End: 2021-06-24

## 2021-06-24 LAB — PHQ9 SCORE: 10

## 2021-06-29 ENCOUNTER — TRANSFERRED RECORDS (OUTPATIENT)
Dept: HEALTH INFORMATION MANAGEMENT | Facility: CLINIC | Age: 81
End: 2021-06-29

## 2021-06-30 ENCOUNTER — TELEPHONE (OUTPATIENT)
Dept: INTERNAL MEDICINE | Facility: CLINIC | Age: 81
End: 2021-06-30

## 2021-06-30 NOTE — TELEPHONE ENCOUNTER
Dahiana Flores, AKIRA CNP  [facility],     Can we help patient get pulmonary function test scheduled.  I've placed order an it appears no one calls her to schedule.     Thanks,   Dahiana Flores, CNP

## 2021-07-01 NOTE — TELEPHONE ENCOUNTER
Called patient and left message to give patient number to make appointment for pulmonary function test.     [phone].       Melinda ESCOTO CMA ([facility])

## 2021-07-01 NOTE — TELEPHONE ENCOUNTER
Patient is scheduled for pulmonary function test on Monday, July 5th at 10:45 a.m.  Kathleen Hwang,

## 2021-07-05 DIAGNOSIS — F33.1 MODERATE EPISODE OF RECURRENT MAJOR DEPRESSIVE DISORDER (H): ICD-10-CM

## 2021-07-05 DIAGNOSIS — R06.09 DYSPNEA ON EXERTION: ICD-10-CM

## 2021-07-05 DIAGNOSIS — F41.9 ANXIETY: ICD-10-CM

## 2021-07-05 DIAGNOSIS — K21.9 GASTROESOPHAGEAL REFLUX DISEASE WITHOUT ESOPHAGITIS: ICD-10-CM

## 2021-07-05 PROCEDURE — 94726 PLETHYSMOGRAPHY LUNG VOLUMES: CPT | Performed by: INTERNAL MEDICINE

## 2021-07-05 PROCEDURE — 94375 RESPIRATORY FLOW VOLUME LOOP: CPT | Performed by: INTERNAL MEDICINE

## 2021-07-05 PROCEDURE — 94729 DIFFUSING CAPACITY: CPT | Performed by: INTERNAL MEDICINE

## 2021-07-05 RX ORDER — PANTOPRAZOLE SODIUM 40 MG/1
TABLET, DELAYED RELEASE ORAL
Qty: 90 TABLET | Refills: 1 | Status: SHIPPED | OUTPATIENT
Start: 2021-07-05 | End: 2021-07-09

## 2021-07-05 RX ORDER — DESVENLAFAXINE 100 MG/1
TABLET, EXTENDED RELEASE ORAL
Qty: 90 TABLET | Refills: 1 | Status: SHIPPED | OUTPATIENT
Start: 2021-07-05 | End: 2022-01-07

## 2021-07-05 NOTE — TELEPHONE ENCOUNTER
Routing refill request to provider for review/approval because:  PHQ-9 score:    PHQ 6/22/2021   PHQ-9 Total Score 12   Q9: Thoughts of better off dead/self-harm past 2 weeks Not at all

## 2021-07-08 DIAGNOSIS — K21.9 GASTROESOPHAGEAL REFLUX DISEASE WITHOUT ESOPHAGITIS: ICD-10-CM

## 2021-07-08 LAB
DLCOUNC-%PRED-PRE: 98 %
DLCOUNC-PRE: 19.82 ML/MIN/MMHG
DLCOUNC-PRED: 20.16 ML/MIN/MMHG
ERV-%PRED-PRE: 35 %
ERV-PRE: 0.13 L
ERV-PRED: 0.37 L
EXPTIME-PRE: 6.99 SEC
FEF2575-%PRED-PRE: 133 %
FEF2575-PRE: 2.08 L/SEC
FEF2575-PRED: 1.57 L/SEC
FEFMAX-%PRED-PRE: 100 %
FEFMAX-PRE: 5.24 L/SEC
FEFMAX-PRED: 5.2 L/SEC
FEV1-%PRED-PRE: 104 %
FEV1-PRE: 2.03 L
FEV1FEV6-PRE: 81 %
FEV1FEV6-PRED: 78 %
FEV1FVC-PRE: 82 %
FEV1FVC-PRED: 78 %
FEV1SVC-PRE: 82 %
FEV1SVC-PRED: 62 %
FIFMAX-PRE: 3.29 L/SEC
FRCPLETH-%PRED-PRE: 88 %
FRCPLETH-PRE: 2.5 L
FRCPLETH-PRED: 2.83 L
FVC-%PRED-PRE: 97 %
FVC-PRE: 2.48 L
FVC-PRED: 2.54 L
IC-%PRED-PRE: 84 %
IC-PRE: 2.34 L
IC-PRED: 2.76 L
RVPLETH-%PRED-PRE: 102 %
RVPLETH-PRE: 2.37 L
RVPLETH-PRED: 2.3 L
TLCPLETH-%PRED-PRE: 91 %
TLCPLETH-PRE: 4.83 L
TLCPLETH-PRED: 5.27 L
VA-%PRED-PRE: 83 %
VA-PRE: 4.21 L
VC-%PRED-PRE: 78 %
VC-PRE: 2.47 L
VC-PRED: 3.13 L

## 2021-07-08 RX ORDER — PANTOPRAZOLE SODIUM 40 MG/1
TABLET, DELAYED RELEASE ORAL
Qty: 90 TABLET | Refills: 1 | Status: CANCELLED | OUTPATIENT
Start: 2021-07-08

## 2021-07-09 NOTE — RESULT ENCOUNTER NOTE
Dear Ginger,    Your recent test results are attached.      Normal pulmonary function test.    If you have any questions please feel free to contact [phone] or myself via [employer].    Sincerely,  Dahiana Flores, CNP

## 2021-07-09 NOTE — TELEPHONE ENCOUNTER
"Routing refill request to provider for review/approval because:  Patient needs to be seen because it has been more than 1 year since last office visit.  Failed - No diagnosis of osteoporosis on record    Demi Villar RN on 7/9/2021 at 4:21 PM    Requested Prescriptions   Pending Prescriptions Disp Refills     pantoprazole (PROTONIX) 40 MG EC tablet [Pharmacy Med Name: PANTOPRAZOLE SODIUM 40MG TBEC] 90 tablet 1     Sig: TAKE ONE TABLET BY MOUTH ONCE DAILY 30-60 MINUTES BEFORE A MEAL       PPI Protocol Failed - 7/8/2021 11:23 AM        Failed - No diagnosis of osteoporosis on record        Failed - Recent (12 mo) or future (30 days) visit within the authorizing provider's specialty     Patient has had an office visit with the authorizing provider or a provider within the authorizing providers department within the previous 12 mos or has a future within next 30 days. See \"Patient Info\" tab in inbasket, or \"Choose Columns\" in Meds & Orders section of the refill encounter.              Passed - Not on Clopidogrel (unless Pantoprazole ordered)        Passed - Medication is active on med list        Passed - Patient is age 18 or older        Passed - No active pregnacy on record        Passed - No positive pregnancy test in past 12 months           Demi Villar RN on 7/9/2021 at 4:21 PM    "

## 2021-07-12 RX ORDER — PANTOPRAZOLE SODIUM 40 MG/1
TABLET, DELAYED RELEASE ORAL
Qty: 90 TABLET | Refills: 1 | Status: SHIPPED | OUTPATIENT
Start: 2021-07-12 | End: 2022-01-07

## 2021-08-18 ENCOUNTER — LAB (OUTPATIENT)
Dept: LAB | Facility: CLINIC | Age: 81
End: 2021-08-18
Payer: COMMERCIAL

## 2021-08-18 DIAGNOSIS — Z79.899 HIGH RISK MEDICATION USE: ICD-10-CM

## 2021-08-18 DIAGNOSIS — M81.0 OSTEOPOROSIS WITHOUT CURRENT PATHOLOGICAL FRACTURE, UNSPECIFIED OSTEOPOROSIS TYPE: ICD-10-CM

## 2021-08-18 DIAGNOSIS — M05.79 RHEUMATOID ARTHRITIS INVOLVING MULTIPLE SITES WITH POSITIVE RHEUMATOID FACTOR (H): ICD-10-CM

## 2021-08-18 LAB
ALBUMIN SERPL-MCNC: 3.6 G/DL (ref 3.4–5)
ALP SERPL-CCNC: 45 U/L (ref 40–150)
ALT SERPL W P-5'-P-CCNC: 23 U/L (ref 0–50)
AST SERPL W P-5'-P-CCNC: 13 U/L (ref 0–45)
BASOPHILS # BLD AUTO: 0 10E3/UL (ref 0–0.2)
BASOPHILS NFR BLD AUTO: 0 %
BILIRUB DIRECT SERPL-MCNC: 0.1 MG/DL (ref 0–0.2)
BILIRUB SERPL-MCNC: 0.4 MG/DL (ref 0.2–1.3)
CALCIUM SERPL-MCNC: 9.5 MG/DL (ref 8.5–10.1)
CREAT SERPL-MCNC: 0.72 MG/DL (ref 0.52–1.04)
CRP SERPL-MCNC: <2.9 MG/L (ref 0–8)
EOSINOPHIL # BLD AUTO: 0.3 10E3/UL (ref 0–0.7)
EOSINOPHIL NFR BLD AUTO: 5 %
ERYTHROCYTE [DISTWIDTH] IN BLOOD BY AUTOMATED COUNT: 13.9 % (ref 10–15)
ERYTHROCYTE [SEDIMENTATION RATE] IN BLOOD BY WESTERGREN METHOD: 23 MM/HR (ref 0–30)
GFR SERPL CREATININE-BSD FRML MDRD: 80 ML/MIN/1.73M2
HCT VFR BLD AUTO: 39.2 % (ref 35–47)
HGB BLD-MCNC: 12.6 G/DL (ref 11.7–15.7)
LYMPHOCYTES # BLD AUTO: 0.7 10E3/UL (ref 0.8–5.3)
LYMPHOCYTES NFR BLD AUTO: 13 %
MCH RBC QN AUTO: 32.4 PG (ref 26.5–33)
MCHC RBC AUTO-ENTMCNC: 32.1 G/DL (ref 31.5–36.5)
MCV RBC AUTO: 101 FL (ref 78–100)
MONOCYTES # BLD AUTO: 0.9 10E3/UL (ref 0–1.3)
MONOCYTES NFR BLD AUTO: 15 %
NEUTROPHILS # BLD AUTO: 4 10E3/UL (ref 1.6–8.3)
NEUTROPHILS NFR BLD AUTO: 67 %
PLATELET # BLD AUTO: 298 10E3/UL (ref 150–450)
PROT SERPL-MCNC: 7.6 G/DL (ref 6.8–8.8)
RBC # BLD AUTO: 3.89 10E6/UL (ref 3.8–5.2)
WBC # BLD AUTO: 5.9 10E3/UL (ref 4–11)

## 2021-08-18 PROCEDURE — 82310 ASSAY OF CALCIUM: CPT

## 2021-08-18 PROCEDURE — 36415 COLL VENOUS BLD VENIPUNCTURE: CPT

## 2021-08-18 PROCEDURE — 85652 RBC SED RATE AUTOMATED: CPT

## 2021-08-18 PROCEDURE — 86481 TB AG RESPONSE T-CELL SUSP: CPT

## 2021-08-18 PROCEDURE — 82565 ASSAY OF CREATININE: CPT

## 2021-08-18 PROCEDURE — 85025 COMPLETE CBC W/AUTO DIFF WBC: CPT

## 2021-08-18 PROCEDURE — 82306 VITAMIN D 25 HYDROXY: CPT

## 2021-08-18 PROCEDURE — 80076 HEPATIC FUNCTION PANEL: CPT

## 2021-08-18 PROCEDURE — 86140 C-REACTIVE PROTEIN: CPT

## 2021-08-19 LAB
DEPRECATED CALCIDIOL+CALCIFEROL SERPL-MC: 63 UG/L (ref 20–75)
QUANTIFERON MITOGEN: 2.62 IU/ML
QUANTIFERON NIL TUBE: 0.03 IU/ML
QUANTIFERON TB1 TUBE: 0.01 IU/ML
QUANTIFERON TB2 TUBE: 0.03

## 2021-08-20 LAB
GAMMA INTERFERON BACKGROUND BLD IA-ACNC: 0.03 IU/ML
M TB IFN-G BLD-IMP: NEGATIVE
M TB IFN-G CD4+ BCKGRND COR BLD-ACNC: 2.59 IU/ML
MITOGEN IGNF BCKGRD COR BLD-ACNC: -0.02 IU/ML
MITOGEN IGNF BCKGRD COR BLD-ACNC: 0 IU/ML

## 2021-09-09 ENCOUNTER — TRANSFERRED RECORDS (OUTPATIENT)
Dept: HEALTH INFORMATION MANAGEMENT | Facility: CLINIC | Age: 81
End: 2021-09-09
Payer: COMMERCIAL

## 2021-09-21 ENCOUNTER — OFFICE VISIT (OUTPATIENT)
Dept: ORTHOPEDICS | Facility: CLINIC | Age: 81
End: 2021-09-21
Payer: COMMERCIAL

## 2021-09-21 VITALS — DIASTOLIC BLOOD PRESSURE: 76 MMHG | SYSTOLIC BLOOD PRESSURE: 124 MMHG

## 2021-09-21 DIAGNOSIS — M54.50 CHRONIC BILATERAL LOW BACK PAIN, UNSPECIFIED WHETHER SCIATICA PRESENT: ICD-10-CM

## 2021-09-21 DIAGNOSIS — M25.552 POSTERIOR PAIN OF HIP, LEFT: Primary | ICD-10-CM

## 2021-09-21 DIAGNOSIS — G89.29 CHRONIC BILATERAL LOW BACK PAIN, UNSPECIFIED WHETHER SCIATICA PRESENT: ICD-10-CM

## 2021-09-21 DIAGNOSIS — Z98.1 HISTORY OF LUMBAR FUSION: ICD-10-CM

## 2021-09-21 PROCEDURE — 99205 OFFICE O/P NEW HI 60 MIN: CPT | Performed by: PEDIATRICS

## 2021-09-21 NOTE — Clinical Note
9/21/2021         RE: Ginger Marshall  [address]  [place] [facility] [address]        Dear Colleague,    Thank you for referring your patient, Ginger Marshall, to the [facility]. Please see a copy of my visit note below.    ASSESSMENT & PLAN    Ginger was seen today for pain.    Diagnoses and all orders for this visit:    Posterior pain of hip, left  -     MR Pelvis Bone wo Contrast; Future    Chronic bilateral low back pain, unspecified whether sciatica present    History of lumbar fusion      Reviewed previous treatment notes, imaging.  Chronic low back issues, now with some aggravation, more focal left low back pain. Favor SI joint source, but she did not get desired relief from most recent injection.  We discussed the following: symptom treatment, activity modification/rest, imaging, rehab, injection therapy and referral back to pain mgmt. Following discussion, plan:  She is interested in additional imaging, which is reasonable given the more focal pain in this area, evaluate SI joint, posterior hip structures. Also evaluate to some degree the hip joint, though this does not clearly appear to be hip joint related.  Await MRI results. From there consider therapy, return to pain mgmt (see previous [facility] notes; some discussion of SCS trial).  Questions answered. Discussed signs and symptoms that may indicate more serious issues; the patient was instructed to seek appropriate care if noted. Ginger indicates understanding of these issues and agrees with the plan.          See Patient Instructions  Patient Instructions   Left low back and posterior hip area pain may be referred from the low back (radicular source versus facet joint are possible), possibly from the SI joint (though no benefit from most recent injection to this area).  It does not appear to be hip joint related based on exam today, though the x-rays do demonstrate mild degenerative change in the left  hip.  Considerations include additional imaging of this area with MRI, possibly injection of the left hip (primarily for diagnostic purposes, but would also be therapeutic), possibly repeat SI joint injection as well.  We also discussed the [facility] note indicating possible spinal cord stimulator trial; would defer to them for that.  Will obtain MRI of the left hip and pelvis next, also evaluate for SI joint source, posterior hip pain.  Lumbar MRI from earlier this year appears recent enough, and repeat is not required currently.  Plan to contact you with MRI results, though may have you return as well for further discussion.    Advanced imaging is done by appointment. Some insurance companies may require a prior authorization to be completed which can delay the time until you are able to schedule your appointment.   Please call [place] [place] [facility] [place]: [phone] to schedule your MRI.  Depending on your availability you can usually schedule within the next 1-2 days.  If you are active on [employer], you may have access to your test results before your provider is able to review the study and advise on next steps.      The clinic will call you with results, if you have not heard from the clinic within 3-4 days following your MRI please contact us at the number listed below.       If you have any further questions for your physician or physician s care team you can call [phone] and use option 3 to leave a voice message. Calls received during business hours will be returned same day.        Aydin Harp, [facility] [place]    -----  Chief Complaint   Patient presents with     Left Hip - Pain       SUBJECTIVE  Ginger A Sweats is a/an 81 year old female who is seen as a self referral for evaluation of left lateral hip and posterior hip pain.  Does have pain down her leg to her foot while she was sleeping yesterday.  Pain began on 9/18/21with no known injury.   It may  "have begun after having to direct her blind son walking straight as she was visiting another son in a nursing home.  States she has had hip and low back pain \"for years\"  HX of surgery in her spine, injections; believes she has had 6 injections for her back this year.     Was seen at [facility] in [place] to follow up with her spine surgeon.  She states they did x rays of her back and her hip, but she has never seen the results.  She was seen by [facility] for her injections, and they were not able to get her x rays either.      HX of right hip WALTER.      The patient is seen by themselves.    Onset: 3 day(s) ago. Ongoing   Location of Pain: left hip and back   Worsened by: any movement or activity   Better with: nothing   Treatments tried: other medications: Oxycodone  Associated symptoms: feeling of instability    Orthopedic/Surgical history: YES - Date: Right hip WALTER, multiple spine surgeries   Social History/Occupation: retired     No family history pertinent to patient's problem today.     **  Most recent injections were in Aug 2021, through [facility]. No benefit.    Pain that had led to injections previously was more diffuse in low back. Now pain is more in left lateral and posterior hip.  That pain has been present since this past weekend, past 3 days. Constant.  Most proximal aspect of pain is near waist line posteriorly, most distal is lateral hip. + pain around anterior hip and to groin.    Sharper area of pain is in left low back.    First 2 injections with [facility] were not beneficial, June 2021; by report, appears left L4-S1 transforaminal OLIVIA. Then had more injections, bilateral SI joint, Aug 2021; was beneficial on right side, no more pain on right. Still with pain on left.    [facility] notes reviewed from this year also.      REVIEW OF SYSTEMS:  Review of Systems   All other systems reviewed and are negative.        OBJECTIVE:  /76  ; remainder deferred with limited mobility  General: healthy, alert and in no " distress  HEENT: no scleral icterus or conjunctival erythema  Skin: no suspicious lesions or rash. No jaundice.  CV: distal perfusion intact   Resp: normal respiratory effort without conversational dyspnea   Psych: normal mood and affect  Gait: antalgic, walker  Neuro: Normal tone    Low back exam:      ROM:     limited flexion due to pain       limited extension due to pain    Tender:     paraspinal muscles left       SI joint, posterior hip left    Non Tender:     remainder of lumbar spine    Strength:     hip flexion left 4/5       knee extension left 4+/5       ankle dorsiflexion 5/5       ankle plantarflexion 5/5       dorsiflexion of the great toe 5/5    Pain on left with testing hip flexion, knee extension  Grossly full on right    Special tests:      slump test left low back, hip area pain on left             Left hip exam    ROM:     Flexion with stiffness, min pain       internal rotation no change in pain      external rotation no change in pain    Tender:      SI joint       Mild posterior hip    Non Tender:      remainder of hip area       greater trochanter    Special Tests:      Pain with attempted BLAINE       neg (-) FADIR       Log roll neg            RADIOLOGY:  I independently visualized and reviewed these images with the patient  Lumbar degenerative change.  See report.    I also visualized/reviewed x-rays obtained through [facility] for low back, left hip; these were obtained elsewhere, no reports available. Images are available in PACS, searching under patient name. Mild left hip joint degenerative change.        Patient: Ginger Marshall  YOB: 1940  Sex: Female  Phone: [phone]    [facility] MRN: [id number]  Exam Date: 03/16/2021     EXAM: MR LUMBAR SPINE WITHOUT CONTRAST    CLINICAL INFORMATION: Left low back, buttock and leg pain.    COMPARISON: 6/13/2019.    CONTRAST: None.    SEDATION: None.    TECHNICAL INFORMATION: Imaging was performed on the recumbent open 1.2 Mckayla scanner. Sagittal  and axial T1/ FSE T2, sagittal STIR and coronal T1 images were obtained through the lumbar spine.    INTERPRETATION:    L5-S1: Moderate disc degeneration, no central stenosis, right facet hypertrophy and chronic severe right/moderate left foraminal stenosis.    L4-5: Moderate disc degeneration, Modic type I endplate marrow changes, disc bulge and left greater than right facet hypertrophy, previous right laminectomy, mild central and mild to moderate subarticular stenosis with bulge impinging L5 roots, moderate chronic left foraminal stenosis and patent right nerve root canal.    L3-4: Interbody and dorsal instrumented fusion with decompression, no recurrent central stenosis, mild chronic left foraminal stenosis and indeterminate osseous fusion status.    L2-3: Mild degeneration, moderate central/subarticular stenosis due to posterior disc bulge and mild facet/ligamentum flavum hypertrophy. Mild foraminal narrowing without ganglion impingement.    L1-2 and T12-L1: Unremarkable.    Osseous Structures:    Fat suppressed images are negative for acute or subacute fractures. Degenerative SI joint changes.    Paraspinous Soft Tissues:    No mass lesions.    Conus, Cord and Cauda Equina:    Normal position conus and no evidence of intradural mass or arachnoiditis.    CONCLUSION:    1. Moderate central stenosis at L2-3 and mild central/mild to moderate subarticular stenosis at L4-5.    2. Severe L5-S1 disc degeneration with chronic severe right and moderate left foraminal stenosis.    3. Indeterminate L3-4 fusion status with no recurrent disc herniation or central stenosis.    4. Modic I endplate discogenic marrow changes at L4-5.    5. Comparison to 6/13/2019 shows interval progression of central stenosis at L2-3 with interval change from L3-4 to L5-S1.    4. No fracture, infection or neoplasm.        Electronically signed on 3/17/2021 8:31:00 AM by Emeka Abbott M.D.            Review of prior external note(s) from -  previous treatment  Review of the result(s) of each unique test - imaging  Independent interpretation of a test performed by another physician/other qualified health care professional (not separately reported) - imaging  Ordering of each unique test  60 minutes spent on the date of the encounter doing chart review, history and exam, documentation and further activities per the note             Again, thank you for allowing me to participate in the care of your patient.        Sincerely,        Aydin Harp, DO

## 2021-09-21 NOTE — PROGRESS NOTES
ASSESSMENT & PLAN    Ginger was seen today for pain.    Diagnoses and all orders for this visit:    Posterior pain of hip, left  -     MR Pelvis Bone wo Contrast; Future    Chronic bilateral low back pain, unspecified whether sciatica present    History of lumbar fusion      Reviewed previous treatment notes, imaging.  Chronic low back issues, now with some aggravation, more focal left low back pain. Favor SI joint source, but she did not get desired relief from most recent injection.  We discussed the following: symptom treatment, activity modification/rest, imaging, rehab, injection therapy and referral back to pain mgmt. Following discussion, plan:  She is interested in additional imaging, which is reasonable given the more focal pain in this area, evaluate SI joint, posterior hip structures. Also evaluate to some degree the hip joint, though this does not clearly appear to be hip joint related.  Await MRI results. From there consider therapy, return to pain mgmt (see previous [facility] notes; some discussion of SCS trial).  Questions answered. Discussed signs and symptoms that may indicate more serious issues; the patient was instructed to seek appropriate care if noted. Ginger indicates understanding of these issues and agrees with the plan.          See Patient Instructions  Patient Instructions   Left low back and posterior hip area pain may be referred from the low back (radicular source versus facet joint are possible), possibly from the SI joint (though no benefit from most recent injection to this area).  It does not appear to be hip joint related based on exam today, though the x-rays do demonstrate mild degenerative change in the left hip.  Considerations include additional imaging of this area with MRI, possibly injection of the left hip (primarily for diagnostic purposes, but would also be therapeutic), possibly repeat SI joint injection as well.  We also discussed the [facility] note indicating possible spinal  "cord stimulator trial; would defer to them for that.  Will obtain MRI of the left hip and pelvis next, also evaluate for SI joint source, posterior hip pain.  Lumbar MRI from earlier this year appears recent enough, and repeat is not required currently.  Plan to contact you with MRI results, though may have you return as well for further discussion.    Advanced imaging is done by appointment. Some insurance companies may require a prior authorization to be completed which can delay the time until you are able to schedule your appointment.   Please call [place], [place] and [place]: [phone] to schedule your MRI.  Depending on your availability you can usually schedule within the next 1-2 days.  If you are active on [employer], you may have access to your test results before your provider is able to review the study and advise on next steps.      The clinic will call you with results, if you have not heard from the clinic within 3-4 days following your MRI please contact us at the number listed below.       If you have any further questions for your physician or physician s care team you can call [phone] and use option 3 to leave a voice message. Calls received during business hours will be returned same day.        Aydin Harp, [facility] [place]    -----  Chief Complaint   Patient presents with     Left Hip - Pain       SUBJECTIVE  Ginger Marshall is a/an 81 year old female who is seen as a self referral for evaluation of left lateral hip and posterior hip pain.  Does have pain down her leg to her foot while she was sleeping yesterday.  Pain began on 9/18/21with no known injury.   It may have begun after having to direct her blind son walking straight as she was visiting another son in a nursing home.  States she has had hip and low back pain \"for years\"  HX of surgery in her spine, injections; believes she has had 6 injections for her back this year.     Was " seen at [facility] in [place] to follow up with her spine surgeon.  She states they did x rays of her back and her hip, but she has never seen the results.  She was seen by [facility] for her injections, and they were not able to get her x rays either.      HX of right hip WALTER.      The patient is seen by themselves.    Onset: 3 day(s) ago. Ongoing   Location of Pain: left hip and back   Worsened by: any movement or activity   Better with: nothing   Treatments tried: other medications: Oxycodone  Associated symptoms: feeling of instability    Orthopedic/Surgical history: YES - Date: Right hip WALTER, multiple spine surgeries   Social History/Occupation: retired     No family history pertinent to patient's problem today.     **  Most recent injections were in Aug 2021, through [facility]. No benefit.    Pain that had led to injections previously was more diffuse in low back. Now pain is more in left lateral and posterior hip.  That pain has been present since this past weekend, past 3 days. Constant.  Most proximal aspect of pain is near waist line posteriorly, most distal is lateral hip. + pain around anterior hip and to groin.    Sharper area of pain is in left low back.    First 2 injections with [facility] were not beneficial, June 2021; by report, appears left L4-S1 transforaminal OLIVIA. Then had more injections, bilateral SI joint, Aug 2021; was beneficial on right side, no more pain on right. Still with pain on left.    [facility] notes reviewed from this year also.      REVIEW OF SYSTEMS:  Review of Systems   All other systems reviewed and are negative.        OBJECTIVE:  /76  ; remainder deferred with limited mobility  General: healthy, alert and in no distress  HEENT: no scleral icterus or conjunctival erythema  Skin: no suspicious lesions or rash. No jaundice.  CV: distal perfusion intact   Resp: normal respiratory effort without conversational dyspnea   Psych: normal mood and affect  Gait: antalgic, walker  Neuro: Normal  tone    Low back exam:      ROM:     limited flexion due to pain       limited extension due to pain    Tender:     paraspinal muscles left       SI joint, posterior hip left    Non Tender:     remainder of lumbar spine    Strength:     hip flexion left 4/5       knee extension left 4+/5       ankle dorsiflexion 5/5       ankle plantarflexion 5/5       dorsiflexion of the great toe 5/5    Pain on left with testing hip flexion, knee extension  Grossly full on right    Special tests:      slump test left low back, hip area pain on left             Left hip exam    ROM:     Flexion with stiffness, min pain       internal rotation no change in pain      external rotation no change in pain    Tender:      SI joint       Mild posterior hip    Non Tender:      remainder of hip area       greater trochanter    Special Tests:      Pain with attempted BLAINE       neg (-) FADIR       Log roll neg            RADIOLOGY:  I independently visualized and reviewed these images with the patient  Lumbar degenerative change.  See report.    I also visualized/reviewed x-rays obtained through [facility] for low back, left hip; these were obtained elsewhere, no reports available. Images are available in PACS, searching under patient name. Mild left hip joint degenerative change.        Patient: Ginger Marshall  YOB: 1940  Sex: Female  Phone: [phone]    [employer] MRN: [id number]  Exam Date: 03/16/2021     EXAM: MR LUMBAR SPINE WITHOUT CONTRAST    CLINICAL INFORMATION: Left low back, buttock and leg pain.    COMPARISON: 6/13/2019.    CONTRAST: None.    SEDATION: None.    TECHNICAL INFORMATION: Imaging was performed on the recumbent open 1.2 Mckayla scanner. Sagittal and axial T1/ FSE T2, sagittal STIR and coronal T1 images were obtained through the lumbar spine.    INTERPRETATION:    L5-S1: Moderate disc degeneration, no central stenosis, right facet hypertrophy and chronic severe right/moderate left foraminal stenosis.    L4-5: Moderate  disc degeneration, Modic type I endplate marrow changes, disc bulge and left greater than right facet hypertrophy, previous right laminectomy, mild central and mild to moderate subarticular stenosis with bulge impinging L5 roots, moderate chronic left foraminal stenosis and patent right nerve root canal.    L3-4: Interbody and dorsal instrumented fusion with decompression, no recurrent central stenosis, mild chronic left foraminal stenosis and indeterminate osseous fusion status.    L2-3: Mild degeneration, moderate central/subarticular stenosis due to posterior disc bulge and mild facet/ligamentum flavum hypertrophy. Mild foraminal narrowing without ganglion impingement.    L1-2 and T12-L1: Unremarkable.    Osseous Structures:    Fat suppressed images are negative for acute or subacute fractures. Degenerative SI joint changes.    Paraspinous Soft Tissues:    No mass lesions.    Conus, Cord and Cauda Equina:    Normal position conus and no evidence of intradural mass or arachnoiditis.    CONCLUSION:    1. Moderate central stenosis at L2-3 and mild central/mild to moderate subarticular stenosis at L4-5.    2. Severe L5-S1 disc degeneration with chronic severe right and moderate left foraminal stenosis.    3. Indeterminate L3-4 fusion status with no recurrent disc herniation or central stenosis.    4. Modic I endplate discogenic marrow changes at L4-5.    5. Comparison to 6/13/2019 shows interval progression of central stenosis at L2-3 with interval change from L3-4 to L5-S1.    4. No fracture, infection or neoplasm.        Electronically signed on 3/17/2021 8:31:00 AM by Emeka Abbott M.D.            Review of prior external note(s) from - previous treatment  Review of the result(s) of each unique test - imaging  Independent interpretation of a test performed by another physician/other qualified health care professional (not separately reported) - imaging  Ordering of each unique test  60 minutes spent on the date  of the encounter doing chart review, history and exam, documentation and further activities per the note

## 2021-09-21 NOTE — PATIENT INSTRUCTIONS
Left low back and posterior hip area pain may be referred from the low back (radicular source versus facet joint are possible), possibly from the SI joint (though no benefit from most recent injection to this area).  It does not appear to be hip joint related based on exam today, though the x-rays do demonstrate mild degenerative change in the left hip.  Considerations include additional imaging of this area with MRI, possibly injection of the left hip (primarily for diagnostic purposes, but would also be therapeutic), possibly repeat SI joint injection as well.  We also discussed the [facility] note indicating possible spinal cord stimulator trial; would defer to them for that.  Will obtain MRI of the left hip and pelvis next, also evaluate for SI joint source, posterior hip pain.  Lumbar MRI from earlier this year appears recent enough, and repeat is not required currently.  Plan to contact you with MRI results, though may have you return as well for further discussion.    Advanced imaging is done by appointment. Some insurance companies may require a prior authorization to be completed which can delay the time until you are able to schedule your appointment.   Please call [place], [place] and [place]: [phone] to schedule your MRI.  Depending on your availability you can usually schedule within the next 1-2 days.  If you are active on [employer], you may have access to your test results before your provider is able to review the study and advise on next steps.      The clinic will call you with results, if you have not heard from the clinic within 3-4 days following your MRI please contact us at the number listed below.       If you have any further questions for your physician or physician s care team you can call [phone] and use option 3 to leave a voice message. Calls received during business hours will be returned same day.

## 2021-09-24 ENCOUNTER — ANCILLARY PROCEDURE (OUTPATIENT)
Dept: MRI IMAGING | Facility: CLINIC | Age: 81
End: 2021-09-24
Attending: PEDIATRICS
Payer: COMMERCIAL

## 2021-09-24 DIAGNOSIS — M25.552 POSTERIOR PAIN OF HIP, LEFT: ICD-10-CM

## 2021-09-24 PROCEDURE — 72195 MRI PELVIS W/O DYE: CPT | Performed by: RADIOLOGY

## 2021-09-25 ENCOUNTER — TELEPHONE (OUTPATIENT)
Dept: ORTHOPEDICS | Facility: CLINIC | Age: 81
End: 2021-09-25

## 2021-09-25 NOTE — TELEPHONE ENCOUNTER
Results for orders placed or performed in visit on 09/24/21   MR Pelvis Bone wo Contrast    Narrative    MR pelvis without contrast 9/24/2021 3:01 PM    Techniques: Multiplanar multisequence imaging of the pelvis was  obtained without  administration of  intravenous contrast using  routing [facility] protocol.    History: history of lumbar fusion; Posterior pain of hip, left     Comparison: Right hip radiograph 1/15/2020 left hip radiograph  6/5/2019, right hip MRI 2/13/2015    Findings:    Metallic susceptibility artifact from the right total hip arthroplasty  severely compromising assessment. Also metallic susceptibility  artifact of the spinal fusion instrumentation hardware compromising  assessment.    Osseous structures  Osseous structures: No fracture, stress reaction, avascular necrosis,  or focal osseous lesion is seen.    Mild degenerative change of the left hip with osteophytosis.    Degenerative changes of bilateral sacroiliac joints, greater on the  right with more prominent osteophytosis.    Redemonstration chronic appearing healed fracture deformities of  bilateral inferior pubic rami. Also bilateral superior pubic rami bony  contour alterations, likely from minimal trauma. Degenerative changes  of visualized lower lumbar spine with severe disc height loss and  desiccation of disc at the L4-L5 and L5-S1.    Internal derangement of joints are not well assessed owing to chosen  field of view.    Joint and Periarticular soft tissue:    Joint effusion: A physiologic amount of joint fluid in left hip.    Bursal effusion: Small greater trochanteric bursal fluid over the left  greater trochanter. Nonspecific edema over the greater trochanter. No  substantial iliopsoas or trochanteric bursal effusion.    Muscles and tendons  Muscles and tendons: Assessment especially compromise of the right  side due to susceptibility artifact. On the left, proximal hamstring,  rectus femoris, sartorius, and iliopsoas tendons are  grossly intact.  The left hip abductors are grossly intact. The visualized adductor  muscles are unremarkable.     Nerves:  The visualized course of the sciatic nerves are unremarkable  bilaterally.    Other Findings:  Colonic diverticulosis..      Impression    Impression:  Severely limited study especially on the right hip secondary to marked  metallic susceptibility artifact.  1. Mild degenerative changes of left hip without subchondral osseous  abnormality to suggest high-grade change.  2. Severe degenerative changes of visualized lower lumbar spine.    [place]         SYSTEM ID:  [id number]     *Note: Due to a large number of results and/or encounters for the requested time period, some results have not been displayed. A complete set of results can be found in Results Review.

## 2021-09-27 NOTE — TELEPHONE ENCOUNTER
Called and spoke with patient.  She will return to [facility].   She did question the diverticulitis noted on the MRI and asked her to contact her primary care provider, for which she already stated she did.    All questions answered  Clementina Gamboa MS, ATC

## 2021-09-27 NOTE — TELEPHONE ENCOUNTER
MRI shows prominent lumbar degenerative change, along with mild left hip joint arthritis. There is also some lateral hip bursitis on the left, and some edema (inflammation) in the lateral hip area.  None of these clearly appear to identify the source of her posterior hip pain. Also, no clear SI joint pathology noted. So, I suspect it is still low back related.  She did not respond as desired to the most recent SI joint injection Aug 2021. Had OLIVIA June 2021.  Options: 1) trial of hip joint injection, with imaging guidance (I don't think the hip joint is clearly the source, but this could be diagnostic as well as therapeutic, if it is helpful); 2) return to pain management (in this case, she has been seen most recently at [facility]) for further care.  I would favor #2. If she desires injection, her pain mgmt group may be able to do, otherwise through one of my colleagues, or pain mgmt here, or radiology.  If #1, contact clinic with update 2-3 weeks. Otherwise, would favor return to pain management.  I would be happy to have a visit with the patient (in person, by video, or by phone) to discuss further if that would be helpful.  Thanks.  Aydin Harp, , [facility]

## 2021-09-29 ENCOUNTER — TELEPHONE (OUTPATIENT)
Dept: PODIATRY | Facility: CLINIC | Age: 81
End: 2021-09-29
Payer: COMMERCIAL

## 2021-09-29 NOTE — TELEPHONE ENCOUNTER
Per Dr Asif chart notes to patient-end of Nov or beginning of Dec available. LVM for patient with dates available today-11-30 or 12-7 to call [phone] to schedule surgery.

## 2021-09-30 ENCOUNTER — HOSPITAL ENCOUNTER (OUTPATIENT)
Facility: AMBULATORY SURGERY CENTER | Age: 81
End: 2021-09-30
Attending: PODIATRIST | Admitting: PODIATRIST
Payer: COMMERCIAL

## 2021-09-30 DIAGNOSIS — M19.071 OSTEOARTHRITIS OF FIRST METATARSOPHALANGEAL (MTP) JOINT OF RIGHT FOOT: ICD-10-CM

## 2021-09-30 NOTE — TELEPHONE ENCOUNTER
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    Date: 9/30/21    Carol Klein  Prior Authorization Dept  [phone]

## 2021-10-07 ENCOUNTER — OFFICE VISIT (OUTPATIENT)
Dept: PODIATRY | Facility: CLINIC | Age: 81
End: 2021-10-07
Payer: COMMERCIAL

## 2021-10-07 VITALS
SYSTOLIC BLOOD PRESSURE: 116 MMHG | DIASTOLIC BLOOD PRESSURE: 76 MMHG | WEIGHT: 208 LBS | HEART RATE: 89 BPM | BODY MASS INDEX: 34.61 KG/M2

## 2021-10-07 DIAGNOSIS — B07.0 VERRUCA PLANTARIS: ICD-10-CM

## 2021-10-07 DIAGNOSIS — M19.071 OSTEOARTHRITIS OF FIRST METATARSOPHALANGEAL (MTP) JOINT OF RIGHT FOOT: Primary | ICD-10-CM

## 2021-10-07 PROCEDURE — 99214 OFFICE O/P EST MOD 30 MIN: CPT | Performed by: PODIATRIST

## 2021-10-07 NOTE — LETTER
"    10/7/2021         RE: Ginger Marshall  [address]  [facility] [address]        Dear Colleague,    Thank you for referring your patient, Ginger Marshall, to the [facility]. Please see a copy of my visit note below.     Subjective:    Pt is seen today for new lesions on the plantar right foot.  She points to first and fourth metatarsal head.  To slight discomfort.  She tried to trim when she noticed some bleeding.  She is also here to talk about her right big toe joint.  She states that only time it is painful is when shoes are pressing on it.  She complains her right foot is larger than her left.  During the summer when she wears sandals she has no pain on this foot.  She states that because this foot is larger she is getting pain.  She has history of pseudoarthrosis here from past surgery.  We had her scheduled for fusion and she would like to discuss this further.  Patient has rheumatoid arthritis.  She has history of multiple stress fractures right foot.  Past history of smoking.  Her mother has history of arthritis.  She is retired.    ROS: See above         Allergies   Allergen Reactions     Abatacept Other (See Comments)     Severe headaches  Migraine     Celebrex [Celecoxib]      Ineffective     Celecoxib Unknown and Nausea     Ethanol      Antihistamines     Orencia [Abatacept]      Headache     Septra [Bactrim]      Sulfa Drugs Nausea and Vomiting     \"deathly ill\"  Allergic to everything with Sulfa in it.     Sulfamethoxazole-Trimethoprim Nausea and Nausea and Vomiting     Tramadol Other (See Comments)     Headache  Migraine headache     Valdecoxib Unknown and Nausea     Made me \"very very ill\", might of been \"cramping\"     Adhesive Tape Rash     Plastic tape  Plastic tapes     Antihistamines, Chlorpheniramine-Type  [Alkylamines] Anxiety and Other (See Comments)       Current Outpatient Medications   Medication Sig Dispense Refill     acetaminophen (TYLENOL) 500 MG " tablet Take 2 tablets (1,000 mg) by mouth every 8 hours as needed for pain 100 tablet 0     atorvastatin (LIPITOR) 40 MG tablet Take 1 tablet (40 mg) by mouth daily 90 tablet 3     Bioflavonoid Products (VITAMIN C) CHEW        Cholecalciferol (VITAMIN D-3 PO) Take 1,000 Units by mouth 2 times daily       desvenlafaxine (PRISTIQ) 100 MG 24 hr tablet TAKE ONE TABLET BY MOUTH ONCE DAILY 90 tablet 1     Ferrous Sulfate 324 (65 Fe) MG TBEC Take 324 mg by mouth daily       leucovorin (WELLCOVORIN) 5 MG tablet Take 1 tablet (5 mg) by mouth every 7 days . Take 24 hours after taking Methotrexate each week. 13 tablet 1     Methotrexate, PF, (RASUVO) 25 MG/0.5ML autoinjector Inject 0.5 mLs (25 mg) Subcutaneous every 7 days . Hold for signs of infection, and seek medical attention. 2 mL 4     naproxen sodium (ALEVE) 220 MG capsule Take 220 mg by mouth as needed       pantoprazole (PROTONIX) 40 MG EC tablet TAKE ONE TABLET BY MOUTH ONCE DAILY 30-60 MINUTES BEFORE A MEAL 90 tablet 1     predniSONE (DELTASONE) 5 MG tablet Prednisone 10 mg daily x14 days, then 5 mg daily x14 days, then stop. 42 tablet 0     sucralfate (CARAFATE) 1 GM tablet TAKE ONE TABLET BY MOUTH FOUR TIMES A DAY AS NEEDED HEATBURN 120 tablet 1     tofacitinib (XELJANZ XR) 11 MG 24 hr tablet Take 1 tablet (11 mg) by mouth daily 90 tablet 2     topiramate (TOPAMAX) 50 MG tablet Take 1.5 tablets (75 mg) by mouth 2 times daily 270 tablet 11     valACYclovir (VALTREX) 500 MG tablet Take 1 tablet (500 mg) by mouth 2 times daily 180 tablet 1       Patient Active Problem List   Diagnosis     Rheumatoid arthritis involving right hand with positive rheumatoid factor (H)     History of colonic polyps     Multinodular goiter     CARDIOVASCULAR SCREENING; LDL GOAL LESS THAN 130     Pulmonary nodule     PSEUDOPHAKIA OU     PVD (POSTERIOR VITREOUS DETACHMENT) OU     PXF (PSEUDOEXFOLIATION OF LENS CAPSULE) OD     GERD (gastroesophageal reflux disease)     Hyperlipidemia LDL  goal <100     Advance Care Planning     Neuropathy     SHERRY (obstructive sleep apnea)-severe (AHI 35)     zEncounter for counseling     Anemia of chronic disease     Osteoporosis     Cornea guttata, ou     Conjunctival concretions     Stenosis, spinal, lumbar     Chronic pain     Class 1 obesity due to excess calories with serious comorbidity and body mass index (BMI) of 31.0 to 31.9 in adult     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     MGD (meibomian gland dysfunction)     Blepharitis of both eyes     Personal history of healed osteoporosis fracture     Iron malabsorption     Hyperglycemia     Chronic bilateral low back pain without sciatica     Allergic state, subsequent encounter     Anxiety     Low iron     History of depression     DDD (degenerative disc disease), lumbar     Chronic right shoulder pain     Moderate episode of recurrent major depressive disorder (H)     Rheumatic mitral stenosis     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       Past Medical History:   Diagnosis Date     Acute posthemorrhagic anemia 10/13/2012     Closed fracture of multiple ribs of left side, initial encounter 11/25/2019     Ex-smoker 01/1999     Gastroenteritis 03/21/2020    Gastroenteritis with norovirus     Herniated nucleus pulposus, L3-4 3/1/2017     Hip joint replacement by other means 07/10/2008     History of blood transfusion      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     History of total knee replacement 07/23/2009     Menopause 1989    late 40's     Migraine 04/27/2014    resolved     Other chronic pain     joints     Pelvic fracture (H) 05/13/2014     Rheumatoid arteritis (H)      S/P lumbar fusion 04/03/2017     Sleep apnea      Vitamin B12 deficiency        Past Surgical History:   Procedure Laterality Date     ABDOMEN SURGERY      c sections     ARTHROSCOPY KNEE Left      ARTHROSCOPY KNEE RT/LT  01/2006    left     BACK SURGERY  2013    disc     BIOPSY BREAST        BREAST SURGERY      lumpectomy      BREAST SURGERY      lumpectomy     C ANESTH,TOTAL HIP ARTHROPLASTY  2010    Rt hip     C HAND/FINGER SURGERY UNLISTED  2005    right hand     C TOTAL KNEE ARTHROPLASTY      left     CATARACT EXTRACTION Bilateral      CATARACT IOL, RT/LT Bilateral 2010 aproximately      SECTION  1966      SECTION  1972     COLONOSCOPY  2009,     COLONOSCOPY       FINGER SURGERY Right 2019    Procedure: DISTAL ULNA RESECTION, RIGHT MIDDLE SILASTIC METACARPALPHALANGEAL EXCHANGE AND RIGHT ELBOW NODULE EXCISION.;  Surgeon: Hilario Redmond MD;  Location: [facility];  Service: Orthopedics     FOOT SURGERY Left      HAND SURGERY Right      HAND SURGERY Right      HC ESOPH/GAS REFLUX TEST W NASAL IMPED >1 HR  2012    Procedure:ESOPHAGEAL IMPEDENCE FUNCTION TEST WITH 24 HOUR PH GREATER THAN 1 HOUR; Surgeon:KAYKAY JULIO; Location: [facility]     IR LUMBAR EPIDURAL STEROID INJECTION       IR TRANSLAMINAR EPIDURAL LUMBAR INJ INCL IMAGING  2012    LESI L5-S1 at [facility]     LUMBAR FUSION       OPTICAL TRACKING SYSTEM FUSION POSTERIOR SPINE LUMBAR N/A 2017    Procedure: OPTICAL TRACKING SYSTEM FUSION SPINE POSTERIOR LUMBAR ONE LEVEL;  Surgeon: Anthony Michele MD;  Location: [facility]     ORTHOPEDIC SURGERY      left foot surgery     ORTHOPEDIC SURGERY      R MCP surgery     ORTHOPEDIC SURGERY      right knee total replacement     TOTAL HIP ARTHROPLASTY Right      TOTAL KNEE ARTHROPLASTY Left      TOTAL KNEE ARTHROPLASTY Right        Family History   Problem Relation Age of Onset     Arthritis Mother      Alzheimer Disease Mother      Hyperlipidemia Mother      Osteoporosis Mother      Heart Disease Father         MI ( from this)     Alcohol/Drug Father      Arthritis Sister      Hypertension Sister      Cancer Son      Diabetes Son      Neurologic Disorder Sister         Schizophrenic     Hypertension Sister       Hyperlipidemia Sister      Mental Illness Sister      Diabetes Son      Other Cancer Son      Glaucoma Daughter        Social History     Tobacco Use     Smoking status: Former Smoker     Packs/day: 1.00     Years: 41.00     Pack years: 41.00     Types: Cigarettes     Quit date: 1999     Years since quittin.7     Smokeless tobacco: Never Used     Tobacco comment: former smoker   Substance Use Topics     Alcohol use: Never     Alcohol/week: 0.0 standard drinks         Exam:    Vitals: /76   Pulse 89   Wt 94.3 kg (208 lb)   BMI 34.61 kg/m    BMI: Body mass index is 34.61 kg/m .  Height: Data Unavailable    Constitutional/ general:  Pt is in no apparent distress, appears well-nourished.  Cooperative with history and physical exam.     Psych:  The patient answered questions appropriately.  Normal affect.  Seems to have reasonable expectations, in terms of treatment.     Lungs:  Non labored breathing, non labored speech. No cough.  No audible wheezing. Even, quiet breathing.       Vascular:  positive pedal pulses bilaterally for both the DP and PT arteries.  CFT < 3 sec.  positive ankle edema/varicosities.  negative pedal hair growth.    Neuro:  Alert and oriented x 3. Coordinated gait.  Light touch sensation is intact     Derm: Normal texture and turgor.  No erythema, ecchymosis, or cyanosis.  Diffuse shearing hyperkeratotic lesion right subfirst and fourth met head with pinpoint bleeding noted with debridement.    Musculoskeletal:    Lower extremity muscle strength is normal.  Patient is ambulatory without an assistive device or brace.  No gross deformities.  Normal arch.   On patient's left foot the first MTPJ is fused solid in good position.  Can see solid fusion on past x-rays.  On her right foot there is hypertrophic bone around the right first MTPJ.  There is almost no range of motion.  All her lesser toes are rectus.  The fourth and fifth toes are somewhat flail and a solid fusion is noted  right second and third toes.    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 Nonspecific soft tissue and possible intraosseous  calcification adjacent to the second metatarsal phalangeal joint.  Probable osteotomies of the fourth and fifth toe proximal phalangeal  heads. Second and third toe proximal interphalangeal joint fusion.  Osteopenia.     RAZ ANTONIO MD    Assessment:  Hallux Limitus stage IV right foot                         RA                         Right foot verruca plantaris    Plan:  Discussed lesions on right foot verruca plantaris.  Discussed treatment options.  We debrided these.  She will use over-the-counter topical treatments first and we did instruct her how to use these.  Had long discussion about her surgery.  With further discussion it sounds as though the patient having larger right foot is her largest issue.  She is not having pain in the pseudoarthrosis per se.  She is wearing sandals all summer with no problems.  Explained that she is at high risk for fusion her failing.  Also discussed recovery.  Discussed if bone soft may have more pain here than before surgery.  I am going to recommend her that we cancel the surgery and try either larger shoe on her right foot or make accommodations.  We discussed releasing her shoes.  We discussed a slit in her shoe to give her forefoot more room.  Discussion she could come in we could help her with this or she could go to a shoe repair store.  RTC as needed.  30 minutes spent in total time counseling patient, reviewing medical records, and coordinating care        Josse Asif DPM FACFAS         Again, thank you for allowing me to participate in the care of your patient.        Sincerely,        Josse Asif DPM

## 2021-10-08 NOTE — PROGRESS NOTES
" Subjective:    Pt is seen today for new lesions on the plantar right foot.  She points to first and fourth metatarsal head.  To slight discomfort.  She tried to trim when she noticed some bleeding.  She is also here to talk about her right big toe joint.  She states that only time it is painful is when shoes are pressing on it.  She complains her right foot is larger than her left.  During the summer when she wears sandals she has no pain on this foot.  She states that because this foot is larger she is getting pain.  She has history of pseudoarthrosis here from past surgery.  We had her scheduled for fusion and she would like to discuss this further.  Patient has rheumatoid arthritis.  She has history of multiple stress fractures right foot.  Past history of smoking.  Her mother has history of arthritis.  She is retired.    ROS: See above         Allergies   Allergen Reactions     Abatacept Other (See Comments)     Severe headaches  Migraine     Celebrex [Celecoxib]      Ineffective     Celecoxib Unknown and Nausea     Ethanol      Antihistamines     Orencia [Abatacept]      Headache     Septra [Bactrim]      Sulfa Drugs Nausea and Vomiting     \"deathly ill\"  Allergic to everything with Sulfa in it.     Sulfamethoxazole-Trimethoprim Nausea and Nausea and Vomiting     Tramadol Other (See Comments)     Headache  Migraine headache     Valdecoxib Unknown and Nausea     Made me \"very very ill\", might of been \"cramping\"     Adhesive Tape Rash     Plastic tape  Plastic tapes     Antihistamines, Chlorpheniramine-Type  [Alkylamines] Anxiety and Other (See Comments)       Current Outpatient Medications   Medication Sig Dispense Refill     acetaminophen (TYLENOL) 500 MG tablet Take 2 tablets (1,000 mg) by mouth every 8 hours as needed for pain 100 tablet 0     atorvastatin (LIPITOR) 40 MG tablet Take 1 tablet (40 mg) by mouth daily 90 tablet 3     Bioflavonoid Products (VITAMIN C) CHEW        Cholecalciferol (VITAMIN D-3 PO) " Take 1,000 Units by mouth 2 times daily       desvenlafaxine (PRISTIQ) 100 MG 24 hr tablet TAKE ONE TABLET BY MOUTH ONCE DAILY 90 tablet 1     Ferrous Sulfate 324 (65 Fe) MG TBEC Take 324 mg by mouth daily       leucovorin (WELLCOVORIN) 5 MG tablet Take 1 tablet (5 mg) by mouth every 7 days . Take 24 hours after taking Methotrexate each week. 13 tablet 1     Methotrexate, PF, (RASUVO) 25 MG/0.5ML autoinjector Inject 0.5 mLs (25 mg) Subcutaneous every 7 days . Hold for signs of infection, and seek medical attention. 2 mL 4     naproxen sodium (ALEVE) 220 MG capsule Take 220 mg by mouth as needed       pantoprazole (PROTONIX) 40 MG EC tablet TAKE ONE TABLET BY MOUTH ONCE DAILY 30-60 MINUTES BEFORE A MEAL 90 tablet 1     predniSONE (DELTASONE) 5 MG tablet Prednisone 10 mg daily x14 days, then 5 mg daily x14 days, then stop. 42 tablet 0     sucralfate (CARAFATE) 1 GM tablet TAKE ONE TABLET BY MOUTH FOUR TIMES A DAY AS NEEDED HEATBURN 120 tablet 1     tofacitinib (XELJANZ XR) 11 MG 24 hr tablet Take 1 tablet (11 mg) by mouth daily 90 tablet 2     topiramate (TOPAMAX) 50 MG tablet Take 1.5 tablets (75 mg) by mouth 2 times daily 270 tablet 11     valACYclovir (VALTREX) 500 MG tablet Take 1 tablet (500 mg) by mouth 2 times daily 180 tablet 1       Patient Active Problem List   Diagnosis     Rheumatoid arthritis involving right hand with positive rheumatoid factor (H)     History of colonic polyps     Multinodular goiter     CARDIOVASCULAR SCREENING; LDL GOAL LESS THAN 130     Pulmonary nodule     PSEUDOPHAKIA OU     PVD (POSTERIOR VITREOUS DETACHMENT) OU     PXF (PSEUDOEXFOLIATION OF LENS CAPSULE) OD     GERD (gastroesophageal reflux disease)     Hyperlipidemia LDL goal <100     Advance Care Planning     Neuropathy     SHERRY (obstructive sleep apnea)-severe (AHI 35)     zEncounter for counseling     Anemia of chronic disease     Osteoporosis     Cornea guttata, ou     Conjunctival concretions     Stenosis, spinal, lumbar      Chronic pain     Class 1 obesity due to excess calories with serious comorbidity and body mass index (BMI) of 31.0 to 31.9 in adult     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     MGD (meibomian gland dysfunction)     Blepharitis of both eyes     Personal history of healed osteoporosis fracture     Iron malabsorption     Hyperglycemia     Chronic bilateral low back pain without sciatica     Allergic state, subsequent encounter     Anxiety     Low iron     History of depression     DDD (degenerative disc disease), lumbar     Chronic right shoulder pain     Moderate episode of recurrent major depressive disorder (H)     Rheumatic mitral stenosis     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       Past Medical History:   Diagnosis Date     Acute posthemorrhagic anemia 10/13/2012     Closed fracture of multiple ribs of left side, initial encounter 11/25/2019     Ex-smoker 01/1999     Gastroenteritis 03/21/2020    Gastroenteritis with norovirus     Herniated nucleus pulposus, L3-4 3/1/2017     Hip joint replacement by other means 07/10/2008     History of blood transfusion      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     History of total knee replacement 07/23/2009     Menopause 1989    late 40's     Migraine 04/27/2014    resolved     Other chronic pain     joints     Pelvic fracture (H) 05/13/2014     Rheumatoid arteritis (H)      S/P lumbar fusion 04/03/2017     Sleep apnea      Vitamin B12 deficiency        Past Surgical History:   Procedure Laterality Date     ABDOMEN SURGERY      c sections     ARTHROSCOPY KNEE Left      ARTHROSCOPY KNEE RT/LT  01/2006    left     BACK SURGERY  2013    disc     BIOPSY BREAST       BREAST SURGERY  1995    lumpectomy 90's     BREAST SURGERY      lumpectomy     C ANESTH,TOTAL HIP ARTHROPLASTY  2010    Rt hip     C HAND/FINGER SURGERY UNLISTED  11/2005    right hand     C TOTAL KNEE ARTHROPLASTY  2006    left     CATARACT EXTRACTION Bilateral       CATARACT IOL, RT/LT Bilateral 2010 aproximately      SECTION  1966      SECTION  1972     COLONOSCOPY  2009,     COLONOSCOPY       FINGER SURGERY Right 2019    Procedure: DISTAL ULNA RESECTION, RIGHT MIDDLE SILASTIC METACARPALPHALANGEAL EXCHANGE AND RIGHT ELBOW NODULE EXCISION.;  Surgeon: Hilario Redmond MD;  Location: [facility];  Service: Orthopedics     FOOT SURGERY Left      HAND SURGERY Right      HAND SURGERY Right      HC ESOPH/GAS REFLUX TEST W NASAL IMPED >1 HR  2012    Procedure:ESOPHAGEAL IMPEDENCE FUNCTION TEST WITH 24 HOUR PH GREATER THAN 1 HOUR; Surgeon:KAYKAY JULIO; Location: [facility]     IR LUMBAR EPIDURAL STEROID INJECTION       IR TRANSLAMINAR EPIDURAL LUMBAR INJ INCL IMAGING  2012    LESI L5-S1 at [facility]     LUMBAR FUSION       OPTICAL TRACKING SYSTEM FUSION POSTERIOR SPINE LUMBAR N/A 2017    Procedure: OPTICAL TRACKING SYSTEM FUSION SPINE POSTERIOR LUMBAR ONE LEVEL;  Surgeon: Anthony Michele MD;  Location: [facility]     ORTHOPEDIC SURGERY      left foot surgery     ORTHOPEDIC SURGERY      R MCP surgery     ORTHOPEDIC SURGERY      right knee total replacement     TOTAL HIP ARTHROPLASTY Right      TOTAL KNEE ARTHROPLASTY Left      TOTAL KNEE ARTHROPLASTY Right        Family History   Problem Relation Age of Onset     Arthritis Mother      Alzheimer Disease Mother      Hyperlipidemia Mother      Osteoporosis Mother      Heart Disease Father         MI ( from this)     Alcohol/Drug Father      Arthritis Sister      Hypertension Sister      Cancer Son      Diabetes Son      Neurologic Disorder Sister         Schizophrenic     Hypertension Sister      Hyperlipidemia Sister      Mental Illness Sister      Diabetes Son      Other Cancer Son      Glaucoma Daughter        Social History     Tobacco Use     Smoking status: Former Smoker     Packs/day: 1.00     Years: 41.00     Pack years: 41.00     Types:  Cigarettes     Quit date: 1999     Years since quittin.7     Smokeless tobacco: Never Used     Tobacco comment: former smoker   Substance Use Topics     Alcohol use: Never     Alcohol/week: 0.0 standard drinks         Exam:    Vitals: /76   Pulse 89   Wt 94.3 kg (208 lb)   BMI 34.61 kg/m    BMI: Body mass index is 34.61 kg/m .  Height: Data Unavailable    Constitutional/ general:  Pt is in no apparent distress, appears well-nourished.  Cooperative with history and physical exam.     Psych:  The patient answered questions appropriately.  Normal affect.  Seems to have reasonable expectations, in terms of treatment.     Lungs:  Non labored breathing, non labored speech. No cough.  No audible wheezing. Even, quiet breathing.       Vascular:  positive pedal pulses bilaterally for both the DP and PT arteries.  CFT < 3 sec.  positive ankle edema/varicosities.  negative pedal hair growth.    Neuro:  Alert and oriented x 3. Coordinated gait.  Light touch sensation is intact     Derm: Normal texture and turgor.  No erythema, ecchymosis, or cyanosis.  Diffuse shearing hyperkeratotic lesion right subfirst and fourth met head with pinpoint bleeding noted with debridement.    Musculoskeletal:    Lower extremity muscle strength is normal.  Patient is ambulatory without an assistive device or brace.  No gross deformities.  Normal arch.   On patient's left foot the first MTPJ is fused solid in good position.  Can see solid fusion on past x-rays.  On her right foot there is hypertrophic bone around the right first MTPJ.  There is almost no range of motion.  All her lesser toes are rectus.  The fourth and fifth toes are somewhat flail and a solid fusion is noted right second and third toes.    XR FOOT RT G/E 3 VW 3/11/2021 12:18 PM      HISTORY: Osteoarthritis of first metatarsophalangeal (MTP) joint of  right foot                                                                      IMPRESSION: First  metatarsal-phalangeal advanced degenerative  arthrosis. Nonspecific soft tissue and possible intraosseous  calcification adjacent to the second metatarsal phalangeal joint.  Probable osteotomies of the fourth and fifth toe proximal phalangeal  heads. Second and third toe proximal interphalangeal joint fusion.  Osteopenia.     RAZ ANTONIO MD    Assessment:  Hallux Limitus stage IV right foot                         RA                         Right foot verruca plantaris    Plan:  Discussed lesions on right foot verruca plantaris.  Discussed treatment options.  We debrided these.  She will use over-the-counter topical treatments first and we did instruct her how to use these.  Had long discussion about her surgery.  With further discussion it sounds as though the patient having larger right foot is her largest issue.  She is not having pain in the pseudoarthrosis per se.  She is wearing sandals all summer with no problems.  Explained that she is at high risk for fusion her failing.  Also discussed recovery.  Discussed if bone soft may have more pain here than before surgery.  I am going to recommend her that we cancel the surgery and try either larger shoe on her right foot or make accommodations.  We discussed releasing her shoes.  We discussed a slit in her shoe to give her forefoot more room.  Discussion she could come in we could help her with this or she could go to a shoe repair store.  RTC as needed.  30 minutes spent in total time counseling patient, reviewing medical records, and coordinating care        WILLIAM Mc

## 2021-10-13 ENCOUNTER — OFFICE VISIT (OUTPATIENT)
Dept: RHEUMATOLOGY | Facility: CLINIC | Age: 81
End: 2021-10-13
Payer: COMMERCIAL

## 2021-10-13 ENCOUNTER — LAB (OUTPATIENT)
Dept: LAB | Facility: CLINIC | Age: 81
End: 2021-10-13

## 2021-10-13 VITALS
WEIGHT: 205 LBS | OXYGEN SATURATION: 96 % | SYSTOLIC BLOOD PRESSURE: 111 MMHG | BODY MASS INDEX: 34.16 KG/M2 | DIASTOLIC BLOOD PRESSURE: 72 MMHG | HEIGHT: 65 IN | HEART RATE: 94 BPM

## 2021-10-13 DIAGNOSIS — M81.0 OSTEOPOROSIS WITHOUT CURRENT PATHOLOGICAL FRACTURE, UNSPECIFIED OSTEOPOROSIS TYPE: ICD-10-CM

## 2021-10-13 DIAGNOSIS — Z92.29 HISTORY OF BISPHOSPHONATE THERAPY: ICD-10-CM

## 2021-10-13 DIAGNOSIS — Z11.59 SCREENING FOR VIRAL DISEASE: ICD-10-CM

## 2021-10-13 DIAGNOSIS — M05.79 RHEUMATOID ARTHRITIS INVOLVING MULTIPLE SITES WITH POSITIVE RHEUMATOID FACTOR (H): ICD-10-CM

## 2021-10-13 DIAGNOSIS — Z79.899 HIGH RISK MEDICATION USE: ICD-10-CM

## 2021-10-13 DIAGNOSIS — M81.0 OSTEOPOROSIS, UNSPECIFIED OSTEOPOROSIS TYPE, UNSPECIFIED PATHOLOGICAL FRACTURE PRESENCE: ICD-10-CM

## 2021-10-13 DIAGNOSIS — M05.79 RHEUMATOID ARTHRITIS INVOLVING MULTIPLE SITES WITH POSITIVE RHEUMATOID FACTOR (H): Primary | ICD-10-CM

## 2021-10-13 LAB
ALBUMIN SERPL-MCNC: 3.7 G/DL (ref 3.4–5)
ALP SERPL-CCNC: 51 U/L (ref 40–150)
ALT SERPL W P-5'-P-CCNC: 22 U/L (ref 0–50)
AST SERPL W P-5'-P-CCNC: 16 U/L (ref 0–45)
BASOPHILS # BLD AUTO: 0 10E3/UL (ref 0–0.2)
BASOPHILS NFR BLD AUTO: 0 %
BILIRUB DIRECT SERPL-MCNC: <0.1 MG/DL (ref 0–0.2)
BILIRUB SERPL-MCNC: 0.2 MG/DL (ref 0.2–1.3)
CALCIUM SERPL-MCNC: 9 MG/DL (ref 8.5–10.1)
CREAT SERPL-MCNC: 0.71 MG/DL (ref 0.52–1.04)
CRP SERPL-MCNC: 4 MG/L (ref 0–8)
EOSINOPHIL # BLD AUTO: 0.3 10E3/UL (ref 0–0.7)
EOSINOPHIL NFR BLD AUTO: 6 %
ERYTHROCYTE [DISTWIDTH] IN BLOOD BY AUTOMATED COUNT: 14.4 % (ref 10–15)
ERYTHROCYTE [SEDIMENTATION RATE] IN BLOOD BY WESTERGREN METHOD: 34 MM/HR (ref 0–30)
GFR SERPL CREATININE-BSD FRML MDRD: 81 ML/MIN/1.73M2
HCT VFR BLD AUTO: 36.9 % (ref 35–47)
HGB BLD-MCNC: 12 G/DL (ref 11.7–15.7)
LYMPHOCYTES # BLD AUTO: 1.3 10E3/UL (ref 0.8–5.3)
LYMPHOCYTES NFR BLD AUTO: 25 %
MCH RBC QN AUTO: 32.3 PG (ref 26.5–33)
MCHC RBC AUTO-ENTMCNC: 32.5 G/DL (ref 31.5–36.5)
MCV RBC AUTO: 100 FL (ref 78–100)
MONOCYTES # BLD AUTO: 0.9 10E3/UL (ref 0–1.3)
MONOCYTES NFR BLD AUTO: 17 %
NEUTROPHILS # BLD AUTO: 2.8 10E3/UL (ref 1.6–8.3)
NEUTROPHILS NFR BLD AUTO: 53 %
PLATELET # BLD AUTO: 365 10E3/UL (ref 150–450)
PROT SERPL-MCNC: 7.8 G/DL (ref 6.8–8.8)
PTH-INTACT SERPL-MCNC: 27 PG/ML (ref 18–80)
RBC # BLD AUTO: 3.71 10E6/UL (ref 3.8–5.2)
WBC # BLD AUTO: 5.2 10E3/UL (ref 4–11)

## 2021-10-13 PROCEDURE — 86140 C-REACTIVE PROTEIN: CPT

## 2021-10-13 PROCEDURE — 36415 COLL VENOUS BLD VENIPUNCTURE: CPT

## 2021-10-13 PROCEDURE — 99214 OFFICE O/P EST MOD 30 MIN: CPT | Performed by: INTERNAL MEDICINE

## 2021-10-13 PROCEDURE — 83970 ASSAY OF PARATHORMONE: CPT

## 2021-10-13 PROCEDURE — 82565 ASSAY OF CREATININE: CPT

## 2021-10-13 PROCEDURE — 82310 ASSAY OF CALCIUM: CPT

## 2021-10-13 PROCEDURE — 85652 RBC SED RATE AUTOMATED: CPT

## 2021-10-13 PROCEDURE — 85025 COMPLETE CBC W/AUTO DIFF WBC: CPT

## 2021-10-13 PROCEDURE — 82306 VITAMIN D 25 HYDROXY: CPT

## 2021-10-13 PROCEDURE — 80076 HEPATIC FUNCTION PANEL: CPT

## 2021-10-13 RX ORDER — NALOXONE HYDROCHLORIDE 0.4 MG/ML
0.2 INJECTION, SOLUTION INTRAMUSCULAR; INTRAVENOUS; SUBCUTANEOUS
Status: CANCELLED | OUTPATIENT
Start: 2022-03-18

## 2021-10-13 RX ORDER — ZOLEDRONIC ACID 5 MG/100ML
5 INJECTION, SOLUTION INTRAVENOUS ONCE
Status: CANCELLED
Start: 2022-03-18 | End: 2022-03-18

## 2021-10-13 RX ORDER — EPINEPHRINE 1 MG/ML
0.3 INJECTION, SOLUTION, CONCENTRATE INTRAVENOUS EVERY 5 MIN PRN
Status: CANCELLED | OUTPATIENT
Start: 2022-03-18

## 2021-10-13 RX ORDER — DIPHENHYDRAMINE HYDROCHLORIDE 50 MG/ML
50 INJECTION INTRAMUSCULAR; INTRAVENOUS
Status: CANCELLED
Start: 2022-03-18

## 2021-10-13 RX ORDER — LEUCOVORIN CALCIUM 5 MG/1
5 TABLET ORAL
Qty: 13 TABLET | Refills: 1 | Status: SHIPPED | OUTPATIENT
Start: 2021-10-13 | End: 2022-02-04

## 2021-10-13 RX ORDER — HEPARIN SODIUM,PORCINE 10 UNIT/ML
5 VIAL (ML) INTRAVENOUS
Status: CANCELLED | OUTPATIENT
Start: 2022-03-18

## 2021-10-13 RX ORDER — HEPARIN SODIUM (PORCINE) LOCK FLUSH IV SOLN 100 UNIT/ML 100 UNIT/ML
5 SOLUTION INTRAVENOUS
Status: CANCELLED | OUTPATIENT
Start: 2022-03-18

## 2021-10-13 RX ORDER — ALBUTEROL SULFATE 0.83 MG/ML
2.5 SOLUTION RESPIRATORY (INHALATION)
Status: CANCELLED | OUTPATIENT
Start: 2022-03-18

## 2021-10-13 RX ORDER — MEPERIDINE HYDROCHLORIDE 25 MG/ML
25 INJECTION INTRAMUSCULAR; INTRAVENOUS; SUBCUTANEOUS EVERY 30 MIN PRN
Status: CANCELLED | OUTPATIENT
Start: 2022-03-18

## 2021-10-13 RX ORDER — UPADACITINIB 15 MG/1
15 TABLET, EXTENDED RELEASE ORAL DAILY
Qty: 30 TABLET | Refills: 3 | Status: SHIPPED | OUTPATIENT
Start: 2021-10-13 | End: 2022-02-01

## 2021-10-13 RX ORDER — ALBUTEROL SULFATE 90 UG/1
1-2 AEROSOL, METERED RESPIRATORY (INHALATION)
Status: CANCELLED
Start: 2022-03-18

## 2021-10-13 RX ORDER — GABAPENTIN 300 MG/1
CAPSULE ORAL
COMMUNITY
Start: 2021-10-12 | End: 2022-01-20

## 2021-10-13 RX ORDER — METHYLPREDNISOLONE SODIUM SUCCINATE 125 MG/2ML
125 INJECTION, POWDER, LYOPHILIZED, FOR SOLUTION INTRAMUSCULAR; INTRAVENOUS
Status: CANCELLED
Start: 2022-03-18

## 2021-10-13 ASSESSMENT — MIFFLIN-ST. JEOR: SCORE: 1405.75

## 2021-10-13 NOTE — NURSING NOTE
RAPID3 (0-30) Cumulative Score  15.7          RAPID3 Weighted Score (divide #4 by 3 and that is the weighted score)  5.2

## 2021-10-13 NOTE — PATIENT INSTRUCTIONS
RHEUMATOLOGY    Dr. Nico Byers    [address] [place] [address]  Phone number: [phone]  Fax number: [phone]    Thank you for choosing [facility]!    Samantha Morales CMA Rheumatology      -------------------------------------    Please call to schedule the Reclast infusion to be on or shortly after 3/18/2022    [place]  [phone]

## 2021-10-13 NOTE — PROGRESS NOTES
Rheumatology Clinic Visit      Ginger Marshall MRN# [id number]   YOB: 1940 Age: 81 year old      Date of visit: 10/13/21   PCP: Dahiana Flores     Chief Complaint   Patient presents with:  Rheumatoid Arthritis: Patient has been in a lot of pain    Assessment and Plan     1. Seropositive Erosive Rheumatoid Arthritis ( [2009], CCP >100 [2009]; hx of rheumatoid nodule by 6/14/2011 pathology): Previously failed remicade (staph infection during therapy, but was immediately after a joint injection), orencia (migraines), HCQ (ineffective).  Sulfa allergy.  Currently on methotrexate 25 mg SQ once weekly (dose reduction in the past resulted in more symptoms), leucovorin 5 mg weekly (resolved nasal sore that didn't improve with higher daily folic acid doses), Xeljanz XR 11mg daily.  She also has prednisone 20 mg daily ×5 days to use as needed for flare.  RA appears well controlled at this time, with no synovitis on exam, but she reports having swelling, pain, and stiffness at the MCPs and PIPs bilaterally that is worse in the morning and reaches a baseline after about 2 hours.  Discussed trial of changing medications and will discontinue Xeljanz and start Rinvoq, keeping with this MOA.  Risks and side effects of Rinvoq reviewed in detail today.  Chronic illness, stable.    - Continue methotrexate from 25mg SQ once weekly  - Continue leucovorin 5 mg every 7 days, 24 hours after MTX dose.   - Discontinue Xeljanz XR 11mg daily  - Start Rinvoq 15 mg daily  - Labs today CBC, Creatinine, Hepatic Panel, ESR, CRP  - Fasting labs in 8 weeks: CBC, Creatinine, Hepatic Panel, ESR, CRP, lipid panel              Rapid 3, cumulative scores                      10/13/2021:  15.7    High risk medication requiring intensive toxicity monitoring at least quarterly: labs ordered include CBC, Creatinine, Hepatic panel to monitor for cytopenia and hepatotoxicity; checking creatinine as it affects clearance of medication.       2. 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 Following with podiatry.      3. Right hip pain: Status post WALTER twice in the past. Followed by [facility].     4. 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 Michele who is now at [facility].  Has been seen at [facility] and now followed with the [facility].     5. Osteoporosis: was previously managed by endocrinology and she received reclast once in 2013, 2014, 2016. 12/12/2018 DEXA ordered by Dr. Vázquez showed osteoporosis.  Again discussed the treatment options for osteoporosis, risks associated with the treatment, and the risk associated with not treating.  I recommended that she treat her osteoporosis but she refused previously.  After more discussion she decided that she would like to proceed with treatment.  Reclast was started with the first dose on 3/18/2021.  Chronic illness, stable.    - Reclast once yearly; next due on 3/18/2022; phone number provided so that she may call to schedule   - Continue vitamin D 1000 IU daily  - Continue calcium 1200mg daily  - Labs today: Calcium, vitamin D, PTH                  6. Left 1st toe pain, history: 2 episodes of sudden onset left toe pain followed by diffuse left foot pain that made ambulation difficult.  Also with nodules over both Achilles tendons and the right elbow that are either rheumatoid nodules or tophi.  She reports having history of gout decades ago.  Denies ever being on colchicine or allopurinol.  Discussed colchicine to use if suspected gout flare occurs.  No flares since last seen so has not used colchicine.  - Colchicine: (MITIGARE) 0.6 MG capsule; Take 2 capsules (1.2 mg) by mouth once for 1 dose at the onset of a gout flare, followed by 1 capsule (0.6mg) 1 hour later.      7. Chronic  pain syndrome: diffusely tender to palpation but no active arthritis on exam. Non-joint areas are tender to palpation. Advised that she discuss with her pain management provider who she plans to see next week.     - COVID-19: has received the Pfizer COVID-19 vaccine on 2/9/2021 and 3/3/2021     A single additional dose of the Pfizer or Moderna COVID-19 vaccine is recommended at least 28 days after the completion of the 2-dose mRNA vaccine series for patients receiving any immunosuppressive or immunomodulatory therapy. Attempts should be made to match the additional mRNA dose type to the type given in the mRNA primary series; however, if that is not feasible, a booster dose with the alternative mRNA vaccine is permitted.      Based on our current understanding (and this may change over time as we learn more), medications should be adjusted as noted below, if disease activity allows:  - NSAIDs and Acetaminophen: hold for 24 hours prior to vaccination if able to do so  - Methotrexate should be held for 1-2 weeks after the mRNA COVID-19 booster vaccine   - Rinvoq (upadacitinib) should be held for 1 week after the mRNA COVID-19 booster vaccine     Total minutes spent in evaluation with patient, documentation, , and review of pertinent studies and chart notes: 32     Ms. Marshall verbalized agreement with and understanding of the rational for the diagnosis and treatment plan.  All questions were answered to best of my ability and the patient's satisfaction. Ms. Marshall was advised to contact the clinic with any questions that may arise after the clinic visit.      Thank you for involving me in the care of the patient    Return to clinic: 3-4 months    HPI   Ginger Marshall is a 81 year old female with a history of multiple foot surgeries, hand tendon transfers, MCP replacements (most recent being in August 2015), bilateral TKA, right WALTER, left distal radius fracture history, gout, s/p lumbar fusion, osteoporosis and  seropositive (RF+, CCP+) erosive rheumatoid arthritis who presents for follow-up of rheumatoid arthritis.      Today, 10/13/2021: MCPs and PIPs with swelling and stiffness in the morning, difficulty making a fist in the morning, but not dropping items.  Which is baseline after the first 2 hours.  No swelling currently.  Biggest issue is her back pain and she is following with the pain clinic with plans for nerve blocks and possible nerve ablation.    Denies fevers, chills, nausea, vomiting, constipation, diarrhea. No abdominal pain. No chest pain/pressure, palpitations, or shortness of breath. No oral or nasal sores.   No rash. No LE swelling.     Tobacco: quit in 1999  EtOH: 1 glass of wine every month at most  Drugs: None  Occupation: , retired     ROS   12 point review of system was completed and negative except as noted in the HPI     Active Problem List     Patient Active Problem List   Diagnosis     Rheumatoid arthritis involving right hand with positive rheumatoid factor (H)     History of colonic polyps     Multinodular goiter     CARDIOVASCULAR SCREENING; LDL GOAL LESS THAN 130     Pulmonary nodule     PSEUDOPHAKIA OU     PVD (POSTERIOR VITREOUS DETACHMENT) OU     PXF (PSEUDOEXFOLIATION OF LENS CAPSULE) OD     GERD (gastroesophageal reflux disease)     Hyperlipidemia LDL goal <100     Advance Care Planning     Neuropathy     SHERRY (obstructive sleep apnea)-severe (AHI 35)     zEncounter for counseling     Anemia of chronic disease     Osteoporosis     Cornea guttata, ou     Conjunctival concretions     Stenosis, spinal, lumbar     Chronic pain     Class 1 obesity due to excess calories with serious comorbidity and body mass index (BMI) of 31.0 to 31.9 in adult     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     MGD (meibomian gland dysfunction)     Blepharitis of both eyes     Personal history of healed osteoporosis fracture     Iron malabsorption     Hyperglycemia     Chronic  bilateral low back pain without sciatica     Allergic state, subsequent encounter     Anxiety     Low iron     History of depression     DDD (degenerative disc disease), lumbar     Chronic right shoulder pain     Moderate episode of recurrent major depressive disorder (H)     Rheumatic mitral stenosis     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     Past Medical History     Past Medical History:   Diagnosis Date     Acute posthemorrhagic anemia 10/13/2012     Closed fracture of multiple ribs of left side, initial encounter 2019     Ex-smoker 1999     Gastroenteritis 2020    Gastroenteritis with norovirus     Herniated nucleus pulposus, L3-4 3/1/2017     Hip joint replacement by other means 07/10/2008     History of blood transfusion      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     History of total knee replacement 2009     Menopause 1989    late 40's     Migraine 2014    resolved     Other chronic pain     joints     Pelvic fracture (H) 2014     Rheumatoid arteritis (H)      S/P lumbar fusion 2017     Sleep apnea      Vitamin B12 deficiency      Past Surgical History     Past Surgical History:   Procedure Laterality Date     ABDOMEN SURGERY      c sections     ARTHROSCOPY KNEE Left      ARTHROSCOPY KNEE RT/LT  2006    left     BACK SURGERY  2013    disc     BIOPSY BREAST       BREAST SURGERY      lumpectomy 's     BREAST SURGERY      lumpectomy     C ANESTH,TOTAL HIP ARTHROPLASTY      Rt hip     C HAND/FINGER SURGERY UNLISTED  2005    right hand     C TOTAL KNEE ARTHROPLASTY      left     CATARACT EXTRACTION Bilateral      CATARACT IOL, RT/LT Bilateral 2010 aproximately      SECTION  1966      SECTION  1972     COLONOSCOPY  2009,     COLONOSCOPY       FINGER SURGERY Right 2019    Procedure: DISTAL ULNA RESECTION, RIGHT MIDDLE SILASTIC METACARPALPHALANGEAL EXCHANGE AND RIGHT ELBOW  "NODULE EXCISION.;  Surgeon: Hilario Redmond MD;  Location: [facility];  Service: Orthopedics     FOOT SURGERY Left      HAND SURGERY Right      HAND SURGERY Right      HC ESOPH/GAS REFLUX TEST W NASAL IMPED >1 HR  02/01/2012    Procedure:ESOPHAGEAL IMPEDENCE FUNCTION TEST WITH 24 HOUR PH GREATER THAN 1 HOUR; Surgeon:KAYKAY JULIO; Location:[facility]     IR LUMBAR EPIDURAL STEROID INJECTION       IR TRANSLAMINAR EPIDURAL LUMBAR INJ INCL IMAGING  05/02/2012    LESI L5-S1 at [facility]     LUMBAR FUSION       OPTICAL TRACKING SYSTEM FUSION POSTERIOR SPINE LUMBAR N/A 04/03/2017    Procedure: OPTICAL TRACKING SYSTEM FUSION SPINE POSTERIOR LUMBAR ONE LEVEL;  Surgeon: Anthony Michele MD;  Location:  OR     ORTHOPEDIC SURGERY  1991    left foot surgery     ORTHOPEDIC SURGERY  1995    R MCP surgery     ORTHOPEDIC SURGERY  2007    right knee total replacement     TOTAL HIP ARTHROPLASTY Right      TOTAL KNEE ARTHROPLASTY Left      TOTAL KNEE ARTHROPLASTY Right      Allergy     Allergies   Allergen Reactions     Abatacept Other (See Comments)     Severe headaches  Migraine     Celebrex [Celecoxib]      Ineffective     Celecoxib Unknown and Nausea     Ethanol      Antihistamines     Orencia [Abatacept]      Headache     Septra [Bactrim]      Sulfa Drugs Nausea and Vomiting     \"deathly ill\"  Allergic to everything with Sulfa in it.     Sulfamethoxazole-Trimethoprim Nausea and Nausea and Vomiting     Tramadol Other (See Comments)     Headache  Migraine headache     Valdecoxib Unknown and Nausea     Made me \"very very ill\", might of been \"cramping\"     Adhesive Tape Rash     Plastic tape  Plastic tapes     Antihistamines, Chlorpheniramine-Type  [Alkylamines] Anxiety and Other (See Comments)     Current Medication List     Current Outpatient Medications   Medication Sig     acetaminophen (TYLENOL) 500 MG tablet Take 2 tablets (1,000 mg) by mouth every 8 hours as needed for pain     atorvastatin (LIPITOR) 40 MG " tablet Take 1 tablet (40 mg) by mouth daily     Bioflavonoid Products (VITAMIN C) CHEW      Cholecalciferol (VITAMIN D-3 PO) Take 1,000 Units by mouth 2 times daily     desvenlafaxine (PRISTIQ) 100 MG 24 hr tablet TAKE ONE TABLET BY MOUTH ONCE DAILY     Ferrous Sulfate 324 (65 Fe) MG TBEC Take 324 mg by mouth daily     gabapentin (NEURONTIN) 300 MG capsule      leucovorin (WELLCOVORIN) 5 MG tablet Take 1 tablet (5 mg) by mouth every 7 days . Take 24 hours after taking Methotrexate each week.     Methotrexate, PF, (RASUVO) 25 MG/0.5ML autoinjector Inject 0.5 mLs (25 mg) Subcutaneous every 7 days . Hold for signs of infection, and seek medical attention.     naproxen sodium (ALEVE) 220 MG capsule Take 220 mg by mouth as needed     pantoprazole (PROTONIX) 40 MG EC tablet TAKE ONE TABLET BY MOUTH ONCE DAILY 30-60 MINUTES BEFORE A MEAL     sucralfate (CARAFATE) 1 GM tablet TAKE ONE TABLET BY MOUTH FOUR TIMES A DAY AS NEEDED HEATBURN     tofacitinib (XELJANZ XR) 11 MG 24 hr tablet Take 1 tablet (11 mg) by mouth daily     topiramate (TOPAMAX) 50 MG tablet Take 1.5 tablets (75 mg) by mouth 2 times daily     valACYclovir (VALTREX) 500 MG tablet Take 1 tablet (500 mg) by mouth 2 times daily     predniSONE (DELTASONE) 5 MG tablet Prednisone 10 mg daily x14 days, then 5 mg daily x14 days, then stop. (Patient not taking: Reported on 10/13/2021)     No current facility-administered medications for this visit.     Social History   See HPI    Family History     Family History   Problem Relation Age of Onset     Arthritis Mother      Alzheimer Disease Mother      Hyperlipidemia Mother      Osteoporosis Mother      Heart Disease Father         MI ( from this)     Alcohol/Drug Father      Arthritis Sister      Hypertension Sister      Cancer Son      Diabetes Son      Neurologic Disorder Sister         Schizophrenic     Hypertension Sister      Hyperlipidemia Sister      Mental Illness Sister      Diabetes Son      Other Cancer  "Son      Glaucoma Daughter      No change in family history since the previous clinic visit.    Physical Exam     Temp Readings from Last 3 Encounters:   06/22/21 98.2  F (36.8  C) (Oral)   05/26/21 97.6  F (36.4  C) (Oral)   05/10/21 97.6  F (36.4  C) (Oral)     BP Readings from Last 5 Encounters:   10/13/21 111/72   10/07/21 116/76   09/21/21 124/76   06/22/21 112/71   06/18/21 123/73     Pulse Readings from Last 1 Encounters:   10/13/21 94     Resp Readings from Last 1 Encounters:   06/22/21 17     Estimated body mass index is 34.11 kg/m  as calculated from the following:    Height as of this encounter: 1.651 m (5' 5\").    Weight as of this encounter: 93 kg (205 lb).    GEN: NAD. Healthy appearing adult.   HEENT:  Anicteric, noninjected sclera. No obvious external lesions of the ear and nose. Hearing intact.  PULM: No increased work of breathing  MSK: Left third PIP tender to palpation without swelling or increased warmth.  MCPs, other PIPs, DIPs without swelling or tenderness to palpation.  Mild ulnar deviation at the MCPs bilaterally.  Postsurgical changes at the MCPs.  Wrists without swelling or tenderness to palpation.  Elbows and shoulders without swelling or tenderness to palpation.  Knees with medial joint line tenderness and crepitation but no effusion or increased warmth.  Ankles and MTPs without swelling or tenderness to palpation.  SKIN: No rash or jaundice seen  PSYCH: Alert. Appropriate.      Labs     CBC  Recent Labs   Lab Test 08/18/21  1054 05/10/21  1023 02/16/21  1019 11/16/20  1028 11/16/20  1028   WBC 5.9 5.0 5.7   < > 5.7   RBC 3.89 3.39* 3.81   < > 3.91   HGB 12.6 11.3* 12.5   < > 12.0   HCT 39.2 35.6 38.9   < > 37.8   * 105* 102*   < > 97   RDW 13.9 14.3 15.2*   < > 16.1*    333 335   < > 361   MCH 32.4 33.3* 32.8   < > 30.7   MCHC 32.1 31.7 32.1   < > 31.7   NEUTROPHIL 67 57.7 65.8   < > 60.5   LYMPH 13 18.5 17.0   < > 20.4   MONOCYTE 15 17.3 12.1   < > 12.8   EOSINOPHIL 5 " 6.3 4.9   < > 6.1   BASOPHIL 0 0.2 0.2   < > 0.2   ANEU  --  2.9 3.8  --  3.5   ALYM  --  0.9 1.0  --  1.2   MARYURI  --  0.9 0.7  --  0.7   AEOS  --  0.3 0.3  --  0.4   ABAS  --  0.0 0.0  --  0.0   ANEUTAUTO 4.0  --   --   --   --    ALYMPAUTO 0.7*  --   --   --   --    AMONOAUTO 0.9  --   --   --   --    AEOSAUTO 0.3  --   --   --   --    ABSBASO 0.0  --   --   --   --     < > = values in this interval not displayed.     CMP  Recent Labs   Lab Test 08/18/21  1054 05/10/21  1023 03/18/21  1350 02/16/21  1019 12/18/20  0923 11/16/20  1028 11/16/20  1028 05/22/20  1026 03/27/20  1413 03/27/20  1413 02/27/20  1117 12/19/19  0957 09/12/19  1019 08/15/19  1118   NA  --   --   --   --   --   --   --   --   --  140  --  142  --  144   POTASSIUM  --   --   --   --   --   --   --   --   --  3.4  --  3.8  --  3.5   CHLORIDE  --   --   --   --   --   --   --   --   --  112*  --  111*  --  112*   CO2  --   --   --   --   --   --   --   --   --  24  --  24  --  25   ANIONGAP  --   --   --   --   --   --   --   --   --  4  --  7  --  7   GLC  --   --   --   --   --   --   --   --   --  98  --  88  --  95   BUN  --   --   --   --   --   --   --   --   --  18  --  10  --  16   CR 0.72 0.59  --  0.62  --    < > 0.60   < >  --  0.57   < > 0.68   < > 0.57   GFRESTIMATED 80 87  --  86  --    < > 87   < >  --  89   < > 84   < > 89   GFRESTBLACK  --  >90  --  >90  --   --  >90   < >  --  >90   < > >90   < > >90   BRANDEE 9.5  --  9.0  --  9.6  --   --   --    < > 9.1  --  9.4  --  9.6   BILITOTAL 0.4 0.3  --  0.4  --    < > 0.4   < >  --   --    < > 0.3   < >  --    ALBUMIN 3.6 3.6  --  4.2  --    < > 4.1   < >  --   --    < > 4.0   < >  --    PROTTOTAL 7.6 7.0  --  8.0  --    < > 7.6   < >  --   --    < > 7.6   < >  --    ALKPHOS 45 45  --  44  --    < > 47   < >  --   --    < > 45   < >  --    AST 13 22  --  21  --    < > 25   < >  --   --    < > 19   < >  --    ALT 23 36  --  34  --    < > 36   < >  --   --    < > 22   < >  --     < > =  "values in this interval not displayed.     Calcium/VitaminD  Recent Labs   Lab Test 08/18/21  1054 03/18/21  1350 12/18/20  0923 08/15/19  1118 03/06/19  1351   BRANDEE 9.5 9.0 9.6   < > 9.3   VITDT 63  --  48  --  41    < > = values in this interval not displayed.     ESR/CRP  Recent Labs   Lab Test 08/18/21  1054 05/10/21  1023 02/16/21  1019   SED 23 32* 20   CRP <2.9 <2.9 <2.9     Lipid Panel  Recent Labs   Lab Test 11/27/20  1043 12/19/19  0957 11/23/18  1309 12/14/17  0957 12/30/15  0842 01/13/14  1109   CHOL 188 166  --  164   < > 135   TRIG 142 113  --  121   < > 89   HDL 89 89  --  79   < > 49*   LDL 71 54 72 61   < > 68   VLDL  --   --   --   --   --  18   CHOLHDLRATIO  --   --   --   --   --  2.8   NHDL 99 77  --  85   < >  --     < > = values in this interval not displayed.     Hepatitis B  Recent Labs   Lab Test 09/15/15  1159   AUSAB 0.11   HBCAB Nonreactive   HEPBANG Nonreactive     Hepatitis C  Recent Labs   Lab Test 09/15/15  1159   HCVAB Nonreactive   Assay performance characteristics have not been established for newborns,   infants, and children       Tuberculosis Screening  Recent Labs   Lab Test 08/18/21  1054 05/07/18  1033 09/15/15  1200   TBRES Negative  --   --    TBRSLT  --  Negative Negative   TBAGN  --  0.00 0.00     \"HAND BILATERAL THREE OR MORE VIEWS 9/15/2015 12:28 PM   HISTORY: Rheumatoid arthritis; establish baseline. Rheumatoid  arthritis(714.0)  COMPARISON: None  IMPRESSION  IMPRESSION: There is diffuse osteopenia. Postoperative changes of the  right second and third metacarpophalangeal joints. Moderate joint  space narrowing involving the left second and third  metacarpophalangeal joints. Mild joint space narrowing of the right  fourth and fifth metacarpophalangeal joints. There are also small  periarticular erosive changes of the metacarpophalangeal joints. There  is fusion of several of the right carpal bones. Marked joint space  loss in the left wrist. Findings are consistent with " "the clinical  history of rheumatoid arthritis. Chronic fracture deformities of the  right distal radius and ulna. No acute fracture is seen.  SPENCER MONSALVE MD\"    Immunization History     Immunization History   Administered Date(s) Administered     COVID-19,PF,Pfizer 02/09/2021, 03/03/2021     FLU 6-35 months 10/24/2006, 11/14/2007, 10/22/2008, 10/21/2009     Flu, Unspecified 10/20/2013     Influenza (High Dose) 3 valent vaccine 10/28/2013, 09/23/2014, 11/11/2015, 09/23/2016, 09/24/2019     Influenza (IIV3) PF 10/12/1999, 10/26/2001, 10/19/2002, 10/30/2003, 10/28/2004, 10/21/2005, 10/26/2007, 10/21/2009, 10/05/2011, 10/13/2012     Influenza Vaccine IM > 6 months Valent IIV4 (Alfuria,Fluzone) 09/24/2020     Influenza Vaccine Im 4yrs+ 4 Valent CCIIV4 09/28/2017, 09/27/2018     Influenza, Quad, High Dose, Pf, 65yr+ (Fluzone HD) 10/07/2021     Mantoux Tuberculin Skin Test 11/03/2006     Pneumo Conj 13-V (2010&after) 07/29/2015     Pneumococcal 23 valent 06/26/2000, 10/26/2001, 06/01/2007, 06/02/2010     TD (ADULT, 7+) 12/10/2008, 07/20/2009     Tdap (Adult) Unspecified Formulation 12/12/2018     Zoster vaccine recombinant adjuvanted (SHINGRIX) 12/12/2018, 02/14/2019          Chart documentation done in part with Dragon Voice recognition Software. Although reviewed after completion, some word and grammatical error may remain.    Nico Byers MD  "

## 2021-10-14 ENCOUNTER — TELEPHONE (OUTPATIENT)
Dept: RHEUMATOLOGY | Facility: CLINIC | Age: 81
End: 2021-10-14

## 2021-10-14 LAB — DEPRECATED CALCIDIOL+CALCIFEROL SERPL-MC: 62 UG/L (ref 20–75)

## 2021-10-14 NOTE — TELEPHONE ENCOUNTER
Prior Authorization Approval    Authorization Effective Date: 9/14/2021  Authorization Expiration Date: 1/12/2022  Medication: rinvoq  Approved Dose/Quantity: 30/30ds  Reference #: [id number]   Insurance Company: [employer] - Phone [phone] Fax [phone]  Expected CoPay: $0     CoPay Card Available: No    Foundation Assistance Needed: na  Which Pharmacy is filling the prescription (Not needed for infusion/clinic administered): [facility] - [place] [address]  Pharmacy Notified: Yes  Patient Notified: Yes

## 2021-10-20 ENCOUNTER — E-VISIT (OUTPATIENT)
Dept: FAMILY MEDICINE | Facility: CLINIC | Age: 81
End: 2021-10-20
Payer: COMMERCIAL

## 2021-10-20 DIAGNOSIS — Z20.822 SUSPECTED COVID-19 VIRUS INFECTION: Primary | ICD-10-CM

## 2021-10-20 PROCEDURE — 99421 OL DIG E/M SVC 5-10 MIN: CPT | Performed by: INTERNAL MEDICINE

## 2021-10-27 ENCOUNTER — MYC MEDICAL ADVICE (OUTPATIENT)
Dept: FAMILY MEDICINE | Facility: CLINIC | Age: 81
End: 2021-10-27

## 2021-10-28 ENCOUNTER — LAB (OUTPATIENT)
Dept: FAMILY MEDICINE | Facility: CLINIC | Age: 81
End: 2021-10-28
Attending: INTERNAL MEDICINE
Payer: COMMERCIAL

## 2021-10-28 DIAGNOSIS — Z20.822 SUSPECTED COVID-19 VIRUS INFECTION: ICD-10-CM

## 2021-10-28 LAB — SARS-COV-2 RNA RESP QL NAA+PROBE: POSITIVE

## 2021-10-28 PROCEDURE — U0005 INFEC AGEN DETEC AMPLI PROBE: HCPCS

## 2021-10-28 PROCEDURE — U0003 INFECTIOUS AGENT DETECTION BY NUCLEIC ACID (DNA OR RNA); SEVERE ACUTE RESPIRATORY SYNDROME CORONAVIRUS 2 (SARS-COV-2) (CORONAVIRUS DISEASE [COVID-19]), AMPLIFIED PROBE TECHNIQUE, MAKING USE OF HIGH THROUGHPUT TECHNOLOGIES AS DESCRIBED BY CMS-2020-01-R: HCPCS

## 2021-10-28 NOTE — TELEPHONE ENCOUNTER
Patient returned call. She declines a virtual appointment and would like an antibiotic for her chest cold. Covid test is pending. She states she would like to go to urgent care. [facility] address provided.     Malu Zamarripa RN

## 2021-10-28 NOTE — TELEPHONE ENCOUNTER
"Routing [email or web address] message to PCP for second level triage.     Patient report cough and coold symptoms starting 10/19/21. Had negative COVID test on 10/20 and is scheduled to have another today 10/28.     Patient confirms chest \"tightness\" and non-stop cough.     Denies chest pain, SOB, fever, or coughing up blood.     Triage protocol indicates that patient should be seen in person today.     Appropriate for virtual visit if available, or prefer UC due to tightness in chest?     Savanah Nelson RN, BSN, PHN  [facility]: [place]    "

## 2021-10-29 ENCOUNTER — VIRTUAL VISIT (OUTPATIENT)
Dept: FAMILY MEDICINE | Facility: CLINIC | Age: 81
End: 2021-10-29
Payer: COMMERCIAL

## 2021-10-29 ENCOUNTER — MYC MEDICAL ADVICE (OUTPATIENT)
Dept: FAMILY MEDICINE | Facility: CLINIC | Age: 81
End: 2021-10-29

## 2021-10-29 DIAGNOSIS — R05.9 COUGH: ICD-10-CM

## 2021-10-29 DIAGNOSIS — U07.1 INFECTION DUE TO 2019 NOVEL CORONAVIRUS: Primary | ICD-10-CM

## 2021-10-29 PROCEDURE — 99213 OFFICE O/P EST LOW 20 MIN: CPT | Mod: 95 | Performed by: NURSE PRACTITIONER

## 2021-10-29 RX ORDER — AZITHROMYCIN 250 MG/1
TABLET, FILM COATED ORAL
Qty: 6 TABLET | Refills: 0 | Status: SHIPPED | OUTPATIENT
Start: 2021-10-29 | End: 2021-11-03

## 2021-10-29 NOTE — TELEPHONE ENCOUNTER
"Spoke with pt. States she has a \"serious cough\". Denies difficulty breathing. Sometimes is wheezing. Symptoms started on 10/21. Denies fever or chills. Cough is non productive. Cough is constant during the night. Has tried mucinex and robitussin cough syrup and a med from [employer] that doesn't have a name and has not had relief of symptoms. Has a severe headache. If she coughs, her head will \"bust right open\". Has diarrhea if she eats. Coffee doesn't taste like coffee. Pt is requesting antibiotics. States she thinks she has covid pneumonia. She has also received a call from the department of [employer] to set up monoclonal antibodies, but would have to drive out to [place] for this. Advised pt should have a virtual visit with provider to discuss. No openings at . Telephone visit scheduled at [place].    Margaux Castillo RN  [facility]    "

## 2021-10-29 NOTE — PROGRESS NOTES
"Ginger is a 81 year old who is being evaluated via a billable telephone visit.      What phone number would you like to be contacted at? [phone]  How would you like to obtain your AVS? MyChart    Assessment & Plan     Infection due to 2019 novel coronavirus    Patient was offered monoclonal antibodies through [place] [facility] but she was told to travel 3 hours to get this and patient is not able to do that.  Patient declined Tessalon Perles as that has been not helpful in the past.  I did give her a prescription for azithromycin incase she is dealing with a concurrent bacterial process.  Advised to get home oxygen saturation monitor.  Encouraged Geneva Lauren to stay in close contact while she recovers at home.  Lives along but states she has a friend who can check on her regularly.             BMI:   Estimated body mass index is 34.11 kg/m  as calculated from the following:    Height as of 10/13/21: 1.651 m (5' 5\").    Weight as of 10/13/21: 93 kg (205 lb).       Return in about 1 week (around 11/5/2021) for if symptoms worsen or fail to improve.    Maura Caldwell NP  [facility]    Subjective   Ginger is a 81 year old who presents for the following health issues     HPI     Acute Illness  Acute illness concerns: 8 days  Onset/Duration: cough and headache  Symptoms:  Fever: no  Chills/Sweats: no  Headache (location?): YES  Sinus Pressure: no  Conjunctivitis:  no  Ear Pain: no  Rhinorrhea: YES  Congestion: YES  Sore Throat: no  Cough: YES-non-productive  Wheeze: YES- sometimes   Decreased Appetite: YES  Nausea: no  Vomiting: no  Diarrhea: YES  Dysuria/Freq.: no  Dysuria or Hematuria: no  Fatigue/Achiness: not sure had RA   Sick/Strep Exposure: no  Therapies tried and outcome: mucinex, tea, tylenol hot water      \"Spoke with pt. States she has a \"serious cough\". Denies difficulty breathing. Sometimes is wheezing. Symptoms started on 10/21. Denies fever or chills. Cough is " "non productive. Cough is constant during the night. Has tried mucinex and robitussin cough syrup and a med from [facility] that doesn't have a name and has not had relief of symptoms. Has a severe headache. If she coughs, her head will \"bust right open\". Has diarrhea if she eats. Coffee doesn't taste like coffee. Pt is requesting antibiotics. States she thinks she has covid pneumonia. She has also received a call from the department of [facility] to set up monoclonal antibodies, but would have to drive out to [place] for this. Advised pt should have a virtual visit with provider to discuss.\"      Review of Systems   Constitutional, HEENT, cardiovascular, pulmonary, gi and gu systems are negative, except as otherwise noted.      Objective           Vitals:  No vitals were obtained today due to virtual visit.    Physical Exam   healthy, alert, no distress, cooperative and fatigued  PSYCH: Alert and oriented times 3; coherent speech, normal   rate and volume, able to articulate logical thoughts, able   to abstract reason, no tangential thoughts, no hallucinations   or delusions  Her affect is normal  RESP: spontaneous harsh cough, no audible wheezing, able to talk in full sentences  Remainder of exam unable to be completed due to telephone visits              Phone call duration: 12 minutes    "

## 2021-10-29 NOTE — RESULT ENCOUNTER NOTE
Ginger MONTEMAYOR Sweats    Your COVID tests is positive.  Plan to quarantine for about 2 weeks from symptoms onset.      You are eligible for monoclonal antibody treatment, this is arranged/distributed by the [facility].  The information is below, and you could submit a request for this treatment online:     Antibody treatments are available for patients with mild to moderate COVID illness in order to prevent severe illness. In general, only patients with risk factors for severe illness are eligible for treatment. For more information, to see if you are eligible, and to find treatment, go to the [facility]:  [email or web address]     --------------------    If your symptoms worsen and you feel you need more help with breathing, then you should to to the ER.       Sincerely,      FELICITA ALMENDAREZ M.D.

## 2021-10-29 NOTE — PATIENT INSTRUCTIONS
"  Discharge Instructions for COVID-19 Patients  You have--or may have--COVID-19. Please follow the instructions listed below.   If you have a weakened immune system, discuss with your doctor any other actions you need to take.  How can I protect others?  If you have symptoms (fever, cough, body aches or trouble breathing):    Stay home and away from others (self-isolate) until:  ? Your other symptoms have resolved (gotten better). And   ? You've had no fever--and no medicine that reduces fever--for 1 full day (24 hours). And   ? At least 10 days have passed since your symptoms started. (You may need to wait 20 days. Follow the advice of your care team.)  If you don't show symptoms, but testing showed that you have COVID-19:    Stay home and away from others (self-isolate) until at least 10 days have passed since the date of your first positive COVID-19 test.  During this time    Stay in your own room, even for meals. Use your own bathroom if you can.    Stay away from others in your home. No hugging, kissing or shaking hands. No visitors.    Don't go to work, school or anywhere else.    Clean \"high touch\" surfaces often (doorknobs, counters, handles). Use household cleaning spray or wipes.    You'll find a full list of  on the EPA website: www.epa.gov/pesticide-registration/list-n-disinfectants-use-against-sars-cov-2.    Cover your mouth and nose with a mask or other face covering to avoid spreading germs.    Wash your hands and face often. Use soap and water.    Caregivers in these groups are at risk for severe illness due to COVID-19:  ? People 65 years and older  ? People who live in a nursing home or long-term care facility  ? People with chronic disease (lung, heart, cancer, diabetes, kidney, liver, immunologic)  ? People who have a weakened immune system, including those who:    Are in cancer treatment    Take medicine that weakens the immune system, such as corticosteroids    Had a bone marrow or " organ transplant    Have an immune deficiency    Have poorly controlled HIV or AIDS    Are obese (body mass index of 40 or higher)    Smoke regularly    Caregivers should wear gloves while washing dishes, handling laundry and cleaning bedrooms and bathrooms.    Use caution when washing and drying laundry: Don't shake dirty laundry and use the warmest water setting that you can.    For more tips on managing your health at home, go to www.cdc.gov/coronavirus/2019-ncov/downloads/10Things.pdf.  How can I take care of myself at home?  1. Get lots of rest. Drink extra fluids (unless a doctor has told you not to).  2. Take Tylenol (acetaminophen) for fever or pain. If you have liver or kidney problems, ask your family doctor if it's okay to take Tylenol.   Adults can take either:   ? 650 mg (two 325 mg pills) every 4 to 6 hours, or   ? 1,000 mg (two 500 mg pills) every 8 hours as needed.  ? Note: Don't take more than 3,000 mg in one day. Acetaminophen is found in many medicines (both prescribed and over-the-counter medicines). Read all labels to be sure you don't take too much.   For children, check the Tylenol bottle for the right dose. The dose is based on the child's age or weight.  3. If you have other health problems (like cancer, heart failure, an organ transplant or severe kidney disease): Call your specialty clinic if you don't feel better in the next 2 days.  4. Know when to call 911. Emergency warning signs include:  ? Trouble breathing or shortness of breath  ? Pain or pressure in the chest that doesn't go away  ? Feeling confused like you haven't felt before, or not being able to wake up  ? Bluish-colored lips or face  5. Your doctor may have prescribed a blood thinner medicine. Follow their instructions.  Where can I get more information?     [facility] [place] - About COVID-19:   [email or web address]    CDC - What to Do If You're Sick:  www.cdc.gov/coronavirus/2019-ncov/about/steps-when-sick.html    CDC - Ending Home Isolation: www.cdc.gov/coronavirus/2019-ncov/hcp/disposition-in-home-patients.html    CDC - Caring for Someone: www.cdc.gov/coronavirus/2019-ncov/if-you-are-sick/care-for-someone.html    [facility] - Interim Guidance for Hospital Discharge to Home: www.health.[facility].[place]./diseases/coronavirus/hcp/hospdischarge.pdf    Below are the COVID-19 hotlines at the [place] [facility] ([facility]). Interpreters are available.  ? For health questions: Call 376-803-6251 or 1-176.890.3469 (7 a.m. to 7 p.m.)  ? For questions about schools and childcare: Call 436-354-9557 or 1-375.650.9022 (7 a.m. to 7 p.m.)    For informational purposes only. Not to replace the advice of your health care provider. Clinically reviewed by Dr. Anurag Leos.   Copyright   2020 [facility]. All rights reserved. Grid20/20 [id number] - REV 01/05/21.

## 2021-11-01 ENCOUNTER — HOME INFUSION (PRE-WILLOW HOME INFUSION) (OUTPATIENT)
Dept: PHARMACY | Facility: CLINIC | Age: 81
End: 2021-11-01

## 2021-11-02 NOTE — PROGRESS NOTES
This is a recent snapshot of the patient's [facility] medical record.  For current drug dose and complete information and questions, call [phone] or In Basket pool, [facility] ([id number])  CSN Number:  [id number]

## 2021-11-05 ENCOUNTER — HOME INFUSION (PRE-WILLOW HOME INFUSION) (OUTPATIENT)
Dept: PHARMACY | Facility: CLINIC | Age: 81
End: 2021-11-05
Payer: COMMERCIAL

## 2021-11-16 ENCOUNTER — TRANSFERRED RECORDS (OUTPATIENT)
Dept: HEALTH INFORMATION MANAGEMENT | Facility: CLINIC | Age: 81
End: 2021-11-16

## 2021-12-07 NOTE — TELEPHONE ENCOUNTER
Spoke with patient who stated she has been having flares quite frequently lately, pain and swelling in joints. Stated she does not have any prednisone 20 mg which she was told to take for flares for 5 days.     Last office visit:  8/9/17  Next office visit:  11/8/17  Next lab appointment for 11/6/17    Forwarding to Dr. Byers.    Samantha Morales CMA  11/1/2017 2:05 PM     [facility]

## 2021-12-08 ENCOUNTER — LAB (OUTPATIENT)
Dept: LAB | Facility: CLINIC | Age: 81
End: 2021-12-08
Payer: COMMERCIAL

## 2021-12-08 DIAGNOSIS — M05.79 RHEUMATOID ARTHRITIS INVOLVING MULTIPLE SITES WITH POSITIVE RHEUMATOID FACTOR (H): ICD-10-CM

## 2021-12-08 DIAGNOSIS — Z79.899 HIGH RISK MEDICATION USE: ICD-10-CM

## 2021-12-08 LAB
ALBUMIN SERPL-MCNC: 3.9 G/DL (ref 3.4–5)
ALP SERPL-CCNC: 45 U/L (ref 40–150)
ALT SERPL W P-5'-P-CCNC: 33 U/L (ref 0–50)
AST SERPL W P-5'-P-CCNC: 19 U/L (ref 0–45)
BASOPHILS # BLD AUTO: 0 10E3/UL (ref 0–0.2)
BASOPHILS NFR BLD AUTO: 0 %
BILIRUB DIRECT SERPL-MCNC: <0.1 MG/DL (ref 0–0.2)
BILIRUB SERPL-MCNC: 0.3 MG/DL (ref 0.2–1.3)
CHOLEST SERPL-MCNC: 184 MG/DL
CREAT SERPL-MCNC: 0.7 MG/DL (ref 0.52–1.04)
CRP SERPL-MCNC: <2.9 MG/L (ref 0–8)
EOSINOPHIL # BLD AUTO: 0.2 10E3/UL (ref 0–0.7)
EOSINOPHIL NFR BLD AUTO: 5 %
ERYTHROCYTE [DISTWIDTH] IN BLOOD BY AUTOMATED COUNT: 15.1 % (ref 10–15)
ERYTHROCYTE [SEDIMENTATION RATE] IN BLOOD BY WESTERGREN METHOD: 16 MM/HR (ref 0–30)
FASTING STATUS PATIENT QL REPORTED: YES
GFR SERPL CREATININE-BSD FRML MDRD: 83 ML/MIN/1.73M2
HCT VFR BLD AUTO: 38.8 % (ref 35–47)
HDLC SERPL-MCNC: 77 MG/DL
HGB BLD-MCNC: 12.2 G/DL (ref 11.7–15.7)
LDLC SERPL CALC-MCNC: 87 MG/DL
LYMPHOCYTES # BLD AUTO: 1.2 10E3/UL (ref 0.8–5.3)
LYMPHOCYTES NFR BLD AUTO: 23 %
MCH RBC QN AUTO: 32.5 PG (ref 26.5–33)
MCHC RBC AUTO-ENTMCNC: 31.4 G/DL (ref 31.5–36.5)
MCV RBC AUTO: 104 FL (ref 78–100)
MONOCYTES # BLD AUTO: 0.7 10E3/UL (ref 0–1.3)
MONOCYTES NFR BLD AUTO: 14 %
NEUTROPHILS # BLD AUTO: 3 10E3/UL (ref 1.6–8.3)
NEUTROPHILS NFR BLD AUTO: 59 %
NONHDLC SERPL-MCNC: 107 MG/DL
PLATELET # BLD AUTO: 384 10E3/UL (ref 150–450)
PROT SERPL-MCNC: 7.7 G/DL (ref 6.8–8.8)
RBC # BLD AUTO: 3.75 10E6/UL (ref 3.8–5.2)
TRIGL SERPL-MCNC: 101 MG/DL
WBC # BLD AUTO: 5.1 10E3/UL (ref 4–11)

## 2021-12-08 PROCEDURE — 80061 LIPID PANEL: CPT

## 2021-12-08 PROCEDURE — 86140 C-REACTIVE PROTEIN: CPT

## 2021-12-08 PROCEDURE — 85025 COMPLETE CBC W/AUTO DIFF WBC: CPT

## 2021-12-08 PROCEDURE — 36415 COLL VENOUS BLD VENIPUNCTURE: CPT

## 2021-12-08 PROCEDURE — 85652 RBC SED RATE AUTOMATED: CPT

## 2021-12-08 PROCEDURE — 80076 HEPATIC FUNCTION PANEL: CPT

## 2021-12-08 PROCEDURE — 82565 ASSAY OF CREATININE: CPT

## 2021-12-15 DIAGNOSIS — M05.79 RHEUMATOID ARTHRITIS INVOLVING MULTIPLE SITES WITH POSITIVE RHEUMATOID FACTOR (H): Chronic | ICD-10-CM

## 2021-12-21 ENCOUNTER — TELEPHONE (OUTPATIENT)
Dept: FAMILY MEDICINE | Facility: CLINIC | Age: 81
End: 2021-12-21
Payer: COMMERCIAL

## 2021-12-21 RX ORDER — METHOTREXATE 25 MG/.5ML
25 INJECTION, SOLUTION SUBCUTANEOUS
Qty: 2 ML | Refills: 4 | Status: SHIPPED | OUTPATIENT
Start: 2021-12-21 | End: 2022-02-04

## 2021-12-21 NOTE — TELEPHONE ENCOUNTER
[facility] calling to check status of Rasuvo. They are expecting a delivery tomorrow.     Thanks,  Patti BONILLAN, RN

## 2021-12-31 DIAGNOSIS — E78.5 HYPERLIPIDEMIA LDL GOAL <100: Chronic | ICD-10-CM

## 2021-12-31 RX ORDER — ATORVASTATIN CALCIUM 40 MG/1
40 TABLET, FILM COATED ORAL DAILY
Qty: 90 TABLET | Refills: 0 | Status: SHIPPED | OUTPATIENT
Start: 2021-12-31 | End: 2022-01-20

## 2021-12-31 NOTE — TELEPHONE ENCOUNTER
Medication is being filled for 1 time refill only due to:  Patient needs to be seen because need appt.   Please schedule-    Return in about 5 months (around 11/28/2021) for Physical Exam.     AKIRA Jones CNP

## 2021-12-31 NOTE — LETTER
December 31, 2021      Ginger MONTEMAYOR Sweats  [address]   [place] [address]        Dear Ginger,     Your provider has sent a 90 day sherley refill of atorvastatin (LIPITOR) 40 MG tablet. You are due for an annual appointment/ physical exam for further refills. Please contact the clinic to schedule an appointment for further refills.        Sincerely,        AKIRA Jones CNP

## 2022-01-18 ASSESSMENT — ENCOUNTER SYMPTOMS
HEADACHES: 0
HEMATURIA: 0
FEVER: 0
MYALGIAS: 1
NAUSEA: 0
BREAST MASS: 0
COUGH: 1
HEARTBURN: 0
SHORTNESS OF BREATH: 0
PARESTHESIAS: 0
WEAKNESS: 0
EYE PAIN: 1
DIARRHEA: 0
SORE THROAT: 0
CONSTIPATION: 0
JOINT SWELLING: 0
DYSURIA: 0
HEMATOCHEZIA: 0
PALPITATIONS: 0
NERVOUS/ANXIOUS: 1
CHILLS: 0
FREQUENCY: 0
ARTHRALGIAS: 0
DIZZINESS: 0
ABDOMINAL PAIN: 0

## 2022-01-18 ASSESSMENT — ACTIVITIES OF DAILY LIVING (ADL)
CURRENT_FUNCTION: HOUSEWORK REQUIRES ASSISTANCE
CURRENT_FUNCTION: PREPARING MEALS REQUIRES ASSISTANCE
CURRENT_FUNCTION: LAUNDRY REQUIRES ASSISTANCE

## 2022-01-20 ENCOUNTER — OFFICE VISIT (OUTPATIENT)
Dept: FAMILY MEDICINE | Facility: CLINIC | Age: 82
End: 2022-01-20
Payer: COMMERCIAL

## 2022-01-20 VITALS
WEIGHT: 210 LBS | OXYGEN SATURATION: 96 % | BODY MASS INDEX: 34.95 KG/M2 | DIASTOLIC BLOOD PRESSURE: 78 MMHG | HEART RATE: 97 BPM | SYSTOLIC BLOOD PRESSURE: 132 MMHG | TEMPERATURE: 98.2 F

## 2022-01-20 DIAGNOSIS — Z78.0 ASYMPTOMATIC POSTMENOPAUSAL STATUS: ICD-10-CM

## 2022-01-20 DIAGNOSIS — E78.5 HYPERLIPIDEMIA LDL GOAL <100: Chronic | ICD-10-CM

## 2022-01-20 DIAGNOSIS — E04.2 MULTINODULAR GOITER: ICD-10-CM

## 2022-01-20 DIAGNOSIS — B00.9 HERPES SIMPLEX VIRUS INFECTION: ICD-10-CM

## 2022-01-20 DIAGNOSIS — K21.9 GASTROESOPHAGEAL REFLUX DISEASE WITHOUT ESOPHAGITIS: ICD-10-CM

## 2022-01-20 DIAGNOSIS — K64.4 EXTERNAL HEMORRHOIDS: ICD-10-CM

## 2022-01-20 DIAGNOSIS — F33.1 MODERATE EPISODE OF RECURRENT MAJOR DEPRESSIVE DISORDER (H): ICD-10-CM

## 2022-01-20 DIAGNOSIS — M05.79 RHEUMATOID ARTHRITIS INVOLVING MULTIPLE SITES WITH POSITIVE RHEUMATOID FACTOR (H): ICD-10-CM

## 2022-01-20 DIAGNOSIS — Z00.00 ENCOUNTER FOR MEDICARE ANNUAL WELLNESS EXAM: Primary | ICD-10-CM

## 2022-01-20 PROCEDURE — 99397 PER PM REEVAL EST PAT 65+ YR: CPT | Performed by: NURSE PRACTITIONER

## 2022-01-20 PROCEDURE — 99213 OFFICE O/P EST LOW 20 MIN: CPT | Mod: 25 | Performed by: NURSE PRACTITIONER

## 2022-01-20 RX ORDER — ATORVASTATIN CALCIUM 40 MG/1
40 TABLET, FILM COATED ORAL DAILY
Qty: 90 TABLET | Refills: 3 | Status: ON HOLD | OUTPATIENT
Start: 2022-01-20 | End: 2023-02-15

## 2022-01-20 RX ORDER — HYDROCORTISONE 25 MG/G
CREAM TOPICAL 2 TIMES DAILY PRN
Qty: 30 G | Refills: 1 | Status: ON HOLD | OUTPATIENT
Start: 2022-01-20 | End: 2023-02-15

## 2022-01-20 RX ORDER — VALACYCLOVIR HYDROCHLORIDE 500 MG/1
500 TABLET, FILM COATED ORAL 2 TIMES DAILY
Qty: 180 TABLET | Refills: 1 | Status: CANCELLED | OUTPATIENT
Start: 2022-01-20

## 2022-01-20 ASSESSMENT — ENCOUNTER SYMPTOMS
NERVOUS/ANXIOUS: 1
CONSTIPATION: 0
HEMATOCHEZIA: 0
ABDOMINAL PAIN: 0
BREAST MASS: 0
PARESTHESIAS: 0
ARTHRALGIAS: 0
FEVER: 0
DIZZINESS: 0
HEADACHES: 0
NAUSEA: 0
WEAKNESS: 0
SORE THROAT: 0
CHILLS: 0
EYE PAIN: 1
SHORTNESS OF BREATH: 0
HEMATURIA: 0
DYSURIA: 0
PALPITATIONS: 0
COUGH: 1
JOINT SWELLING: 0
MYALGIAS: 1
DIARRHEA: 0
HEARTBURN: 0
FREQUENCY: 0

## 2022-01-20 ASSESSMENT — ACTIVITIES OF DAILY LIVING (ADL)
CURRENT_FUNCTION: LAUNDRY REQUIRES ASSISTANCE
CURRENT_FUNCTION: PREPARING MEALS REQUIRES ASSISTANCE
CURRENT_FUNCTION: HOUSEWORK REQUIRES ASSISTANCE

## 2022-01-20 ASSESSMENT — PATIENT HEALTH QUESTIONNAIRE - PHQ9: SUM OF ALL RESPONSES TO PHQ QUESTIONS 1-9: 9

## 2022-01-20 NOTE — PATIENT INSTRUCTIONS
Patient Education   Personalized Prevention Plan  You are due for the preventive services outlined below.  Your care team is available to assist you in scheduling these services.  If you have already completed any of these items, please share that information with your care team to update in your medical record.  Health Maintenance Due   Topic Date Due     URINE DRUG SCREEN  Never done     ANNUAL REVIEW OF HM ORDERS  Never done     COVID-19 Vaccine (3 - Booster for Pfizer series) 08/03/2021     Eye Exam  08/18/2021     FALL RISK ASSESSMENT  11/27/2021     Osteoporosis Screening  12/12/2021     Your Health Risk Assessment indicates you feel you are not in good health    A healthy lifestyle helps keep the body fit and the mind alert. It helps protect you from disease, helps you fight disease, and helps prevent chronic disease (disease that doesn't go away) from getting worse. This is important as you get older and begin to notice twinges in muscles and joints and a decline in the strength and stamina you once took for granted. A healthy lifestyle includes good healthcare, good nutrition, weight control, recreation, and regular exercise. Avoid harmful substances and do what you can to keep safe. Another part of a healthy lifestyle is stay mentally active and socially involved.    Good healthcare     Have a wellness visit every year.     If you have new symptoms, let us know right away. Don't wait until the next checkup.     Take medicines exactly as prescribed and keep your medicines in a safe place. Tell us if your medicine causes problems.   Healthy diet and weight control     Eat 3 or 4 small, nutritious, low-fat, high-fiber meals a day. Include a variety of fruits, vegetables, and whole-grain foods.     Make sure you get enough calcium in your diet. Calcium, vitamin D, and exercise help prevent osteoporosis (bone thinning).     If you live alone, try eating with others when you can. That way you get a good meal  and have company while you eat it.     Try to keep a healthy weight. If you eat more calories than your body uses for energy, it will be stored as fat and you will gain weight.     Recreation   Recreation is not limited to sports and team events. It includes any activity that provides relaxation, interest, enjoyment, and exercise. Recreation provides an outlet for physical, mental, and social energy. It can give a sense of worth and achievement. It can help you stay healthy.    Mental Exercise and Social Involvement  Mental and emotional health is as important as physical health. Keep in touch with friends and family. Stay as active as possible. Continue to learn and challenge yourself.   Things you can do to stay mentally active are:    Learn something new, like a foreign language or musical instrument.     Play SCRABBLE or do crossword puzzles. If you cannot find people to play these games with you at home, you can play them with others on your computer through the Internet.     Join a games club--anything from card games to chess or checkers or lawn bowling.     Start a new hobby.     Go back to school.     Volunteer.     Read.   Keep up with world events.    Exercise for a Healthier Heart  You may wonder how you can improve the health of your heart. If you re thinking about exercise, you re on the right track. You don t need to become an athlete. But you do need a certain amount of brisk exercise to help strengthen your heart. If you have been diagnosed with a heart condition, your healthcare provider may advise exercise to help stabilize your condition. To help make exercise a habit, choose safe, fun activities.      Exercise with a friend. When activity is fun, you're more likely to stick with it.   Before you start  Check with your healthcare provider before starting an exercise program. This is especially important if you have not been active for a while. It's also important if you have a long-term (chronic)  health problem such as heart disease, diabetes, or obesity. Or if you are at high risk for having these problems.   Why exercise?  Exercising regularly offers many healthy rewards. It can help you do all of the following:     Improve your blood cholesterol level to help prevent further heart trouble    Lower your blood pressure to help prevent a stroke or heart attack    Control diabetes, or reduce your risk of getting this disease    Improve your heart and lung function    Reach and stay at a healthy weight    Make your muscles stronger so you can stay active    Prevent falls and fractures by slowing the loss of bone mass (osteoporosis)    Manage stress better    Reduce your blood pressure    Improve your sense of self and your body image  Exercise tips      Ease into your routine. Set small goals. Then build on them. If you are not sure what your activity level should be, talk with your healthcare provider first before starting an exercise routine.    Exercise on most days. Aim for a total of 150 minutes (2 hours and 30 minutes) or more of moderate-intensity aerobic activity each week. Or 75 minutes (1 hour and 15 minutes) or more of vigorous-intensity aerobic activity each week. Or try for a combination of both. Moderate activity means that you breathe heavier and your heart rate increases but you can still talk. Think about doing 40 minutes of moderate exercise, 3 to 4 times a week. For best results, activity should last for about 40 minutes to lower blood pressure and cholesterol. It's OK to work up to the 40-minute period over time. Examples of moderate-intensity activity are walking 1 mile in 15 minutes. Or doing 30 to 45 minutes of yard work.    Step up your daily activity level.  Along with your exercise program, try being more active the whole day. Walk instead of drive. Or park further away so that you take more steps each day. Do more household tasks or yard work. You may not be able to meet the advised  mount of physical activity. But doing some moderate- or vigorous-intensity aerobic activity can help reduce your risk for heart disease. Your healthcare provider can help you figure out what is best for you.    Choose 1 or more activities you enjoy.  Walking is one of the easiest things you can do. You can also try swimming, riding a bike, dancing, or taking an exercise class.    When to call your healthcare provider  Call your healthcare provider if you have any of these:     Chest pain or feel dizzy or lightheaded    Burning, tightness, pressure, or heaviness in your chest, neck, shoulders, back, or arms    Abnormal shortness of breath    More joint or muscle pain    A very fast or irregular heartbeat (palpitations)  [employer] last reviewed this educational content on 7/1/2019 2000-2021 [employer]. All rights reserved. This information is not intended as a substitute for professional medical care. Always follow your healthcare professional's instructions.          Understanding USDA MyPlate  The USDA has guidelines to help you make healthy food choices. These are called MyPlate. MyPlate shows the food groups that make up healthy meals using the image of a place setting. Before you eat, think about the healthiest choices for what to put on your plate or in your cup or bowl. To learn more about building a healthy plate, visit www.choosemyplate.gov.    The food groups    Fruits. Any fruit or 100% fruit juice counts as part of the Fruit Group. Fruits may be fresh, canned, frozen, or dried, and may be whole, cut-up, or pureed. Make 1/2 of your plate fruits and vegetables.    Vegetables. Any vegetable or 100% vegetable juice counts as a member of the Vegetable Group. Vegetables may be fresh, frozen, canned, or dried. They can be served raw or cooked and may be whole, cut-up, or mashed. Make 1/2 of your plate fruits and vegetables.    Grains. All foods made from grains are part of the Grains Group. These  include wheat, rice, oats, cornmeal, and barley. Grains are often used to make foods such as bread, pasta, oatmeal, cereal, tortillas, and grits. Grains should be no more than 1/4 of your plate. At least half of your grains should be whole grains.    Protein. This group includes meat, poultry, seafood, beans and peas, eggs, processed soy products (such as tofu), nuts (including nut butters), and seeds. Make protein choices no more than 1/4 of your plate. Meat and poultry choices should be lean or low fat.    Dairy. The Dairy Group includes all fluid milk products and foods made from milk that contain calcium, such as yogurt and cheese. (Foods that have little calcium, such as cream, butter, and cream cheese, are not part of this group.) Most dairy choices should be low-fat or fat-free.    Oils. Oils aren't a food group, but they do contain essential nutrients. However it's important to watch your intake of oils. These are fats that are liquid at room temperature. They include canola, corn, olive, soybean, vegetable, and sunflower oil. Foods that are mainly oil include mayonnaise, certain salad dressings, and soft margarines. You likely already get your daily oil allowance from the foods you eat.  Things to limit  Eating healthy also means limiting these things in your diet:       Salt (sodium). Many processed foods have a lot of sodium. To keep sodium intake down, eat fresh vegetables, meats, poultry, and seafood when possible. Purchase low-sodium, reduced-sodium, or no-salt-added food products at the store. And don't add salt to your meals at home. Instead, season them with herbs and spices such as dill, oregano, cumin, and paprika. Or try adding flavor with lemon or lime zest and juice.    Saturated fat. Saturated fats are most often found in animal products such as beef, pork, and chicken. They are often solid at room temperature, such as butter. To reduce your saturated fat intake, choose leaner cuts of meat and  poultry. And try healthier cooking methods such as grilling, broiling, roasting, or baking. For a simple lower-fat swap, use plain nonfat yogurt instead of mayonnaise when making potato salad or macaroni salad.    Added sugars. These are sugars added to foods. They are in foods such as ice cream, candy, soda, fruit drinks, sports drinks, energy drinks, cookies, pastries, jams, and syrups. Cut down on added sugars by sharing sweet treats with a family member or friend. You can also choose fruit for dessert, and drink water or other unsweetened beverages.     [employer] last reviewed this educational content on 6/1/2020 2000-2021 [employer]. All rights reserved. This information is not intended as a substitute for professional medical care. Always follow your healthcare professional's instructions.        Activities of Daily Living    Your Health Risk Assessment indicates you have difficulties with activities of daily living such as housework, bathing, preparing meals, taking medication, etc. Please make a follow up appointment for us to address this issue in more detail.    Depression and Suicide in Older Adults    Nearly 2 million older Americans have some type of depression. Some of them even take their own lives. Yet depression among older adults is often ignored. Learn the warning signs. You may help spare a loved one needless pain. You may also save a life.   What is depression?  Depression is a common and serious illness that affects the way you think and feel. It is not a normal part of aging, nor is it a sign of weakness, a character flaw, or something you can snap out of. Most people with depression need treatment to get better. The most common symptom is a feeling of deep sadness. People who are depressed also may seem tired and listless. And nothing seems to give them pleasure. It s normal to grieve or be sad sometimes. But sadness lessens or passes with time. Depression rarely goes away or  "improves on its own. A person with clinical depression can't \"snap out of it.\" Other symptoms of depression are:     Sleeping more or less than normal    Eating more or less than normal    Having headaches, stomachaches, or other pains that don t go away    Feeling nervous,  empty,  or worthless    Crying a great deal    Thinking or talking about suicide or death    Loss of interest in activities previously enjoyed    Social isolation    Feeling confused or forgetful  What causes it?  The causes of depression aren t fully known. But it is thought to result from a complex blend of these factors:     Biochemistry. Certain chemicals in the brain play a role.    Genes. Depression does run in families.    Life stress. Life stresses can also trigger depression in some people. Older adults often face many stressors, such as death of friends or a spouse, health problems, and financial concerns.    Chronic conditions. This includes conditions such as diabetes, heart disease, or cancer. These can cause symptoms of depression. Medicine side effects can cause changes in thoughts and behaviors.  How you can help  Often, depressed people may not want to ask for help. When they do, they may be ignored. Or, they may receive the wrong treatment. You can help by showing parents and older friends love and support. If they seem depressed, don t lecture the person, ignore the symptoms, or discount the symptoms as a  normal  part of aging -which they are not. Get involved, listen, and show interest and support.   Help them understand that depression is a treatable illness. Tell them you can help them find the right treatment. Offer to go to their healthcare provider's appointment with them for support when the symptoms are discussed. With their approval, contact a local mental health center, social service agency, or hospital about services.   You can be an advocate for him or her at healthcare appointments. Many older adults have " chronic illnesses that can cause symptoms of depression. Medicine side effects can change thoughts and behaviors. You can help make sure that the healthcare provider looks at all of these factors. He or she should refer your family member or friend to a mental healthcare provider when needed. in some cases, untreated depression can lead to a misdiagnosis. A person may be diagnosed with a brain disorder such as dementia. If the healthcare provider does not take the issue of depression seriously, help your family member or friend to find another provider.   Don't be afraid to ask  If you think an older person you care about could be suicidal, ask,  Have you thought about suicide?  Most people will tell you the truth. If they say  yes,  they may already have a plan for how and when they will attempt it. Find out as much as you can. The more detailed the plan, and the easier it is to carry out, the more danger the person is in right now. Tell the person you are there for them and do not want them to harm him or herself. Don't wait to get help for the person. Call the person's healthcare provider, local hospital, or emergency services.   To learn more    National Suicide Prevention Lifeline (crisis hotline) 754-653-BTRS (512-140-2379)    National [place] of Mental Dczabb055-613-5937har.nimh.nih.gov    National [place] on Mental Rtlgcov498-619-5750lgo.blayne.org    Mental Health Whsehqa954-770-7668wah.[facility].org    National Suicide Grbejwb920-MORXXQK (309-734-9283)    Call 911  Never leave the person alone. A person who is actively suicidal needs psychiatric care right away. They will need constant supervision. Never leave the person out of sight. Call 911 or the national 24-hour suicide crisis hotline at 147-113-OLHJ (235-101-1859). You can also take the person to the closest emergency room.   [employer] last reviewed this educational content on 5/1/2020 2000-2021 [employer]. All rights reserved. This  information is not intended as a substitute for professional medical care. Always follow your healthcare professional's instructions.         Try increasing protein with afternoon snacks- unsalted nuts, cheese, cottage cheese, peanut butter, veggies, greek yogurt with berries.

## 2022-01-20 NOTE — PROGRESS NOTES
"SUBJECTIVE:   Ginger Marshall is a 81 year old female who presents for Preventive Visit.      Patient has been advised of split billing requirements and indicates understanding: Yes  Are you in the first 12 months of your Medicare coverage?  No    Healthy Habits:     In general, how would you rate your overall health?  Fair    Frequency of exercise:  None    Do you usually eat at least 4 servings of fruit and vegetables a day, include whole grains    & fiber and avoid regularly eating high fat or \"junk\" foods?  No    Taking medications regularly:  Yes    Medication side effects:  None    Ability to successfully perform activities of daily living:  Preparing meals requires assistance, housework requires assistance and laundry requires assistance    Home Safety:  No safety concerns identified    Hearing Impairment:  No hearing concerns    In the past 6 months, have you been bothered by leaking of urine?  No    In general, how would you rate your overall mental or emotional health?  Good      PHQ-2 Total Score: 1    Additional concerns today:  Yes    Hemorrhoids have been painful lately.  She notes pain and bleeding with bowel movements.  She denies melena, hematochezia, abdominal pain.  She is not putting anything on them currently.  She denies constipation.    Patient notes mood is stable.    Patient notes that she keeps gaining weight.  She is snacking in the afternoon and feels she doesn't want to cook meals.    Do you feel safe in your environment? Yes    Have you ever done Advance Care Planning? (For example, a Health Directive, POLST, or a discussion with a medical provider or your loved ones about your wishes): Yes, advance care planning is on file.       Fall risk  Fallen 2 or more times in the past year?: No  Any fall with injury in the past year?: No    Cognitive Screening   1) Repeat 3 items (Leader, Season, Table)    2) Clock draw: NORMAL  3) 3 item recall: Recalls 3 objects  Results: 3 items recalled: " COGNITIVE IMPAIRMENT LESS LIKELY    Mini-CogTM Copyright EDIL Oden. Licensed by the author for use in [facility]; reprinted with permission ([email or web address]@.[email or web address]). All rights reserved.      Do you have sleep apnea, excessive snoring or daytime drowsiness?: yes    Reviewed and updated as needed this visit by clinical staff  Tobacco  Allergies  Meds  Problems  Med Hx  Surg Hx  Fam Hx  Soc Hx         Reviewed and updated as needed this visit by Provider  Tobacco  Allergies  Meds  Problems  Med Hx  Surg Hx  Fam Hx        Social History     Tobacco Use     Smoking status: Former Smoker     Packs/day: 1.00     Years: 41.00     Pack years: 41.00     Types: Cigarettes     Quit date: 1999     Years since quittin.0     Smokeless tobacco: Never Used     Tobacco comment: former smoker   Substance Use Topics     Alcohol use: Yes     Alcohol/week: 0.0 standard drinks     Comment: Occasionally,  usually wine         Alcohol Use 2022   Prescreen: >3 drinks/day or >7 drinks/week? No               Current providers sharing in care for this patient include:   Patient Care Team:  Dahiana Flores APRN CNP as PCP - General (Internal Medicine)  Aguila San MD as Assigned Surgical Provider  Anton Louie MD as Assigned Sleep Provider  Dahiana Flores APRN CNP as Assigned PCP  Josse Asif DPM as Assigned Musculoskeletal Provider  Nico Byers MD as Assigned Rheumatology Provider    The following health maintenance items are reviewed in Epic and correct as of today:  Health Maintenance Due   Topic Date Due     URINE DRUG SCREEN  Never done     ANNUAL REVIEW OF HM ORDERS  Never done     COVID-19 Vaccine (3 - Booster for Pfizer series) 2021     EYE EXAM  2021     FALL RISK ASSESSMENT  2021     DEXA  2021     Labs reviewed in EPIC  BP Readings from Last 3 Encounters:   22 132/78   10/13/21 111/72   10/07/21 116/76    Wt Readings from Last 3  Encounters:   01/20/22 95.3 kg (210 lb)   10/13/21 93 kg (205 lb)   10/07/21 94.3 kg (208 lb)                  Patient Active Problem List   Diagnosis     Rheumatoid arthritis involving right hand with positive rheumatoid factor (H)     History of colonic polyps     Multinodular goiter     CARDIOVASCULAR SCREENING; LDL GOAL LESS THAN 130     Pulmonary nodule     PSEUDOPHAKIA OU     PVD (POSTERIOR VITREOUS DETACHMENT) OU     PXF (PSEUDOEXFOLIATION OF LENS CAPSULE) OD     GERD (gastroesophageal reflux disease)     Hyperlipidemia LDL goal <100     Advance Care Planning     Neuropathy     SHERRY (obstructive sleep apnea)-severe (AHI 35)     zEncounter for counseling     Anemia of chronic disease     Osteoporosis     Cornea guttata, ou     Conjunctival concretions     Stenosis, spinal, lumbar     Chronic pain     Class 1 obesity due to excess calories with serious comorbidity and body mass index (BMI) of 31.0 to 31.9 in adult     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     MGD (meibomian gland dysfunction)     Blepharitis of both eyes     Personal history of healed osteoporosis fracture     Iron malabsorption     Hyperglycemia     Chronic bilateral low back pain without sciatica     Allergic state, subsequent encounter     Anxiety     Low iron     History of depression     DDD (degenerative disc disease), lumbar     Chronic right shoulder pain     Moderate episode of recurrent major depressive disorder (H)     Rheumatic mitral stenosis     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     History of bisphosphonate therapy     Past Surgical History:   Procedure Laterality Date     ABDOMEN SURGERY      c sections     ARTHROSCOPY KNEE Left      ARTHROSCOPY KNEE RT/LT  01/2006    left     BACK SURGERY  2013    disc     BIOPSY BREAST       BREAST SURGERY  1995    lumpectomy 90's     BREAST SURGERY      lumpectomy     CATARACT EXTRACTION Bilateral      CATARACT IOL, RT/LT Bilateral 2010 2010  aproximately      SECTION  1966      SECTION  1972     COLONOSCOPY  2009,     COLONOSCOPY       FINGER SURGERY Right 2019    Procedure: DISTAL ULNA RESECTION, RIGHT MIDDLE SILASTIC METACARPALPHALANGEAL EXCHANGE AND RIGHT ELBOW NODULE EXCISION.;  Surgeon: Hilario Redmond MD;  Location: [facility];  Service: Orthopedics     FOOT SURGERY Left      GYN SURGERY  66,72     HAND SURGERY Right      HAND SURGERY Right      HC ESOPH/GAS REFLUX TEST W NASAL IMPED >1 HR  2012    Procedure:ESOPHAGEAL IMPEDENCE FUNCTION TEST WITH 24 HOUR PH GREATER THAN 1 HOUR; Surgeon:KAYKAY JULIO; Location:[facility]     IR LUMBAR EPIDURAL STEROID INJECTION       IR TRANSLAMINAR EPIDURAL LUMBAR INJ INCL IMAGING  2012    LESI L5-S1 at [facility]     LUMBAR FUSION       OPTICAL TRACKING SYSTEM FUSION POSTERIOR SPINE LUMBAR N/A 2017    Procedure: OPTICAL TRACKING SYSTEM FUSION SPINE POSTERIOR LUMBAR ONE LEVEL;  Surgeon: Anthony Michele MD;  Location:  OR     ORTHOPEDIC SURGERY      left foot surgery     ORTHOPEDIC SURGERY      R MCP surgery     ORTHOPEDIC SURGERY      right knee total replacement     TOTAL HIP ARTHROPLASTY Right      TOTAL KNEE ARTHROPLASTY Left      TOTAL KNEE ARTHROPLASTY Right      ZZC ANESTH,TOTAL HIP ARTHROPLASTY      Rt hip     ZZC HAND/FINGER SURGERY UNLISTED  2005    right hand     ZZC TOTAL KNEE ARTHROPLASTY  2006    left       Social History     Tobacco Use     Smoking status: Former Smoker     Packs/day: 1.00     Years: 41.00     Pack years: 41.00     Types: Cigarettes     Quit date: 1999     Years since quittin.0     Smokeless tobacco: Never Used     Tobacco comment: former smoker   Substance Use Topics     Alcohol use: Yes     Alcohol/week: 0.0 standard drinks     Comment: Occasionally,  usually wine     Family History   Problem Relation Age of Onset     Arthritis Mother      Alzheimer Disease Mother      Hyperlipidemia  "Mother      Osteoporosis Mother      Heart Disease Father         MI ( from this)     Alcohol/Drug Father      Arthritis Sister      Hypertension Sister      Other Cancer Sister         Luekemia     Cancer Son      Diabetes Son         type 1     Neurologic Disorder Sister         Schizophrenic     Hypertension Sister      Hyperlipidemia Sister      Mental Illness Sister      Diabetes Son      Other Cancer Son      Substance Abuse Son      Glaucoma Daughter      Diabetes Other         type 2     Hyperlipidemia Other                Pertinent mammograms are reviewed under the imaging tab.    Review of Systems   Constitutional: Negative for chills and fever.   HENT: Positive for ear pain. Negative for congestion, hearing loss and sore throat.    Eyes: Positive for pain and visual disturbance.   Respiratory: Positive for cough. Negative for shortness of breath.    Cardiovascular: Negative for chest pain, palpitations and peripheral edema.   Gastrointestinal: Negative for abdominal pain, constipation, diarrhea, heartburn, hematochezia and nausea.   Breasts:  Negative for tenderness, breast mass and discharge.   Genitourinary: Negative for dysuria, frequency, genital sores, hematuria, pelvic pain, urgency, vaginal bleeding and vaginal discharge.   Musculoskeletal: Positive for myalgias. Negative for arthralgias and joint swelling.   Skin: Negative for rash.   Neurological: Negative for dizziness, weakness, headaches and paresthesias.   Psychiatric/Behavioral: Negative for mood changes. The patient is nervous/anxious.          OBJECTIVE:   /78 (BP Location: Right arm, Patient Position: Chair, Cuff Size: Adult Large)   Pulse 97   Temp 98.2  F (36.8  C) (Oral)   Wt 95.3 kg (210 lb)   SpO2 96%   BMI 34.95 kg/m   Estimated body mass index is 34.95 kg/m  as calculated from the following:    Height as of 10/13/21: 1.651 m (5' 5\").    Weight as of this encounter: 95.3 kg (210 lb).  Physical Exam  GENERAL: healthy, " alert and no distress  EYES: Eyes grossly normal to inspection, PERRL and conjunctivae and sclerae normal  HENT: ear canals and TM's normal, nose and mouth without ulcers or lesions  NECK: no adenopathy, no asymmetry, masses, or scars, thyroid normal to palpation and no carotid bruits  RESP: lungs clear to auscultation - no rales, rhonchi or wheezes  BREAST: normal without masses, tenderness or nipple discharge and no palpable axillary masses or adenopathy  CV: regular rate and rhythm, normal S1 S2, no S3 or S4, no murmur, click or rub, no peripheral edema and peripheral pulses strong  ABDOMEN: soft, nontender, no hepatosplenomegaly, no masses and bowel sounds normal  RECTAL (female): external hemorrhoid  MS: no gross musculoskeletal defects noted, no edema  NEURO: Normal strength and tone, mentation intact and speech normal  PSYCH: mentation appears normal, affect normal/bright        ASSESSMENT / PLAN:   (Z00.00) Encounter for Medicare annual wellness exam  (primary encounter diagnosis)  Comment:   Plan:     (M05.79) Rheumatoid arthritis involving multiple sites with positive rheumatoid factor (H)  Comment:   Plan: Following with rheumatology.    (F33.1) Moderate episode of recurrent major depressive disorder (H)  Comment:   Plan: Stable.  Continue current treatment plan and medications.     (B00.9) Herpes simplex virus infection  Comment:   Plan: Stable.  No symptoms currently.    (E78.5) Hyperlipidemia LDL goal <100  Comment: Stable.  Continue current treatment plan and medications.   Plan: atorvastatin (LIPITOR) 40 MG tablet            (E04.2) Multinodular goiter  Comment:   Plan: US Thyroid            (Z78.0) Asymptomatic postmenopausal status  Comment:   Plan: DX Hip/Pelvis/Spine            (K21.9) Gastroesophageal reflux disease without esophagitis  Comment:   Plan: Stable.    (K64.4) External hemorrhoids  Comment:   Plan: hydrocortisone, Perianal, (HYDROCORTISONE) 2.5         % cream              Patient  "has been advised of split billing requirements and indicates understanding: Yes    COUNSELING:  Reviewed preventive health counseling, as reflected in patient instructions       Regular exercise       Healthy diet/nutrition    Estimated body mass index is 34.95 kg/m  as calculated from the following:    Height as of 10/13/21: 1.651 m (5' 5\").    Weight as of this encounter: 95.3 kg (210 lb).    Weight management plan: Discussed healthy diet and exercise guidelines    She reports that she quit smoking about 23 years ago. Her smoking use included cigarettes. She has a 41.00 pack-year smoking history. She has never used smokeless tobacco.      Appropriate preventive services were discussed with this patient, including applicable screening as appropriate for cardiovascular disease, diabetes, osteopenia/osteoporosis, and glaucoma.  As appropriate for age/gender, discussed screening for colorectal cancer, prostate cancer, breast cancer, and cervical cancer. Checklist reviewing preventive services available has been given to the patient.    Reviewed patients plan of care and provided an AVS. The Basic Care Plan (routine screening as documented in Health Maintenance) for Ginger meets the Care Plan requirement. This Care Plan has been established and reviewed with the Patient.    Counseling Resources:  ATP IV Guidelines  Pooled Cohorts Equation Calculator  Breast Cancer Risk Calculator  Breast Cancer: Medication to Reduce Risk  FRAX Risk Assessment  ICSI Preventive Guidelines  Dietary Guidelines for Americans, 2010  USDA's MyPlate  ASA Prophylaxis  Lung CA Screening    AKIRA Jones [facility]    Identified Health Risks:    The patient was provided with suggestions to help her develop a healthy physical lifestyle.  She is at risk for lack of exercise and has been provided with information to increase physical activity for the benefit of her well-being.  The patient was counseled and " encouraged to consider modifying their diet and eating habits. She was provided with information on recommended healthy diet options.  The patient reports that she has difficulty with activities of daily living. I have asked that the patient make a follow up appointment in 53 weeks where this issue will be further evaluated and addressed.  The patient's PHQ-9 score is consistent with mild depression. She was provided with information regarding depression and was advised to schedule a follow up appointment in 26 weeks to further address this issue.

## 2022-01-20 NOTE — LETTER
January 20, 2022      Ginger MONTEMAYOR Joanna  [address] [address]        To Whom It May Concern,      The above patient suffers from depression and requires the presence of her dog and cat to improve her mental health.          Sincerely,        AKIRA Jones CNP

## 2022-01-27 ENCOUNTER — TRANSFERRED RECORDS (OUTPATIENT)
Dept: HEALTH INFORMATION MANAGEMENT | Facility: CLINIC | Age: 82
End: 2022-01-27
Payer: COMMERCIAL

## 2022-01-28 NOTE — PROGRESS NOTES
This is a recent snapshot of the patient's [facility] medical record.  For current drug dose and complete information and questions, call [phone] or In Basket pool, [facility] ([id number])  CSN Number:  [id number]

## 2022-01-31 DIAGNOSIS — M05.79 RHEUMATOID ARTHRITIS INVOLVING MULTIPLE SITES WITH POSITIVE RHEUMATOID FACTOR (H): ICD-10-CM

## 2022-02-01 RX ORDER — UPADACITINIB 15 MG/1
15 TABLET, EXTENDED RELEASE ORAL DAILY
Qty: 30 TABLET | Refills: 3 | Status: SHIPPED | OUTPATIENT
Start: 2022-02-01 | End: 2022-05-03

## 2022-02-01 NOTE — TELEPHONE ENCOUNTER
Labs were completed on 12/8/21 and showed no sign of toxicity. Refilled prescription per rheumatology protocol.    Diomedes ANTONY RN....2/1/2022 2:27 PM

## 2022-02-02 ENCOUNTER — ANCILLARY PROCEDURE (OUTPATIENT)
Dept: BONE DENSITY | Facility: CLINIC | Age: 82
End: 2022-02-02
Attending: NURSE PRACTITIONER
Payer: COMMERCIAL

## 2022-02-02 ENCOUNTER — ANCILLARY PROCEDURE (OUTPATIENT)
Dept: ULTRASOUND IMAGING | Facility: CLINIC | Age: 82
End: 2022-02-02
Attending: NURSE PRACTITIONER
Payer: COMMERCIAL

## 2022-02-02 DIAGNOSIS — Z78.0 ASYMPTOMATIC POSTMENOPAUSAL STATUS: ICD-10-CM

## 2022-02-02 DIAGNOSIS — E04.2 MULTINODULAR GOITER: ICD-10-CM

## 2022-02-02 PROCEDURE — 76536 US EXAM OF HEAD AND NECK: CPT | Performed by: RADIOLOGY

## 2022-02-02 PROCEDURE — 77081 DXA BONE DENSITY APPENDICULR: CPT | Performed by: INTERNAL MEDICINE

## 2022-02-02 PROCEDURE — 77080 DXA BONE DENSITY AXIAL: CPT | Mod: XU | Performed by: INTERNAL MEDICINE

## 2022-02-03 NOTE — RESULT ENCOUNTER NOTE
Dear Ginger,    Your recent test results are attached.      Stable thyroid nodules.    If you have any questions please feel free to contact [phone] or myself via [employer].    Sincerely,  Dahiana Flores, CNP

## 2022-02-04 ENCOUNTER — OFFICE VISIT (OUTPATIENT)
Dept: RHEUMATOLOGY | Facility: CLINIC | Age: 82
End: 2022-02-04
Payer: COMMERCIAL

## 2022-02-04 VITALS
WEIGHT: 209.8 LBS | OXYGEN SATURATION: 92 % | HEART RATE: 86 BPM | HEIGHT: 65 IN | BODY MASS INDEX: 34.95 KG/M2 | SYSTOLIC BLOOD PRESSURE: 124 MMHG | DIASTOLIC BLOOD PRESSURE: 72 MMHG

## 2022-02-04 DIAGNOSIS — M81.0 OSTEOPOROSIS, UNSPECIFIED OSTEOPOROSIS TYPE, UNSPECIFIED PATHOLOGICAL FRACTURE PRESENCE: ICD-10-CM

## 2022-02-04 DIAGNOSIS — Z79.899 HIGH RISK MEDICATION USE: ICD-10-CM

## 2022-02-04 DIAGNOSIS — M05.79 RHEUMATOID ARTHRITIS INVOLVING MULTIPLE SITES WITH POSITIVE RHEUMATOID FACTOR (H): Primary | ICD-10-CM

## 2022-02-04 LAB
ALBUMIN SERPL-MCNC: 4.1 G/DL (ref 3.4–5)
ALP SERPL-CCNC: 46 U/L (ref 40–150)
ALT SERPL W P-5'-P-CCNC: 25 U/L (ref 0–50)
AST SERPL W P-5'-P-CCNC: 13 U/L (ref 0–45)
BASOPHILS # BLD AUTO: 0 10E3/UL (ref 0–0.2)
BASOPHILS NFR BLD AUTO: 0 %
BILIRUB DIRECT SERPL-MCNC: 0.1 MG/DL (ref 0–0.2)
BILIRUB SERPL-MCNC: 0.5 MG/DL (ref 0.2–1.3)
CREAT SERPL-MCNC: 0.59 MG/DL (ref 0.52–1.04)
CRP SERPL-MCNC: <2.9 MG/L (ref 0–8)
EOSINOPHIL # BLD AUTO: 0.3 10E3/UL (ref 0–0.7)
EOSINOPHIL NFR BLD AUTO: 4 %
ERYTHROCYTE [DISTWIDTH] IN BLOOD BY AUTOMATED COUNT: 14.7 % (ref 10–15)
ERYTHROCYTE [SEDIMENTATION RATE] IN BLOOD BY WESTERGREN METHOD: 14 MM/HR (ref 0–30)
GFR SERPL CREATININE-BSD FRML MDRD: >90 ML/MIN/1.73M2
HCT VFR BLD AUTO: 39.1 % (ref 35–47)
HGB BLD-MCNC: 12.5 G/DL (ref 11.7–15.7)
LYMPHOCYTES # BLD AUTO: 1 10E3/UL (ref 0.8–5.3)
LYMPHOCYTES NFR BLD AUTO: 14 %
MCH RBC QN AUTO: 32.6 PG (ref 26.5–33)
MCHC RBC AUTO-ENTMCNC: 32 G/DL (ref 31.5–36.5)
MCV RBC AUTO: 102 FL (ref 78–100)
MONOCYTES # BLD AUTO: 1.2 10E3/UL (ref 0–1.3)
MONOCYTES NFR BLD AUTO: 16 %
NEUTROPHILS # BLD AUTO: 5 10E3/UL (ref 1.6–8.3)
NEUTROPHILS NFR BLD AUTO: 67 %
PLATELET # BLD AUTO: 386 10E3/UL (ref 150–450)
PROT SERPL-MCNC: 7.7 G/DL (ref 6.8–8.8)
RBC # BLD AUTO: 3.83 10E6/UL (ref 3.8–5.2)
WBC # BLD AUTO: 7.5 10E3/UL (ref 4–11)

## 2022-02-04 PROCEDURE — 86140 C-REACTIVE PROTEIN: CPT | Performed by: INTERNAL MEDICINE

## 2022-02-04 PROCEDURE — 85652 RBC SED RATE AUTOMATED: CPT | Performed by: INTERNAL MEDICINE

## 2022-02-04 PROCEDURE — 85025 COMPLETE CBC W/AUTO DIFF WBC: CPT | Performed by: INTERNAL MEDICINE

## 2022-02-04 PROCEDURE — 99214 OFFICE O/P EST MOD 30 MIN: CPT | Performed by: INTERNAL MEDICINE

## 2022-02-04 PROCEDURE — 80076 HEPATIC FUNCTION PANEL: CPT | Performed by: INTERNAL MEDICINE

## 2022-02-04 PROCEDURE — 36415 COLL VENOUS BLD VENIPUNCTURE: CPT | Performed by: INTERNAL MEDICINE

## 2022-02-04 PROCEDURE — 82565 ASSAY OF CREATININE: CPT | Performed by: INTERNAL MEDICINE

## 2022-02-04 RX ORDER — LEUCOVORIN CALCIUM 5 MG/1
5 TABLET ORAL
Qty: 13 TABLET | Refills: 2 | Status: SHIPPED | OUTPATIENT
Start: 2022-02-04 | End: 2022-05-03

## 2022-02-04 RX ORDER — METHOTREXATE 25 MG/.5ML
25 INJECTION, SOLUTION SUBCUTANEOUS
Qty: 2 ML | Refills: 6 | Status: SHIPPED | OUTPATIENT
Start: 2022-02-04 | End: 2022-05-03

## 2022-02-04 ASSESSMENT — MIFFLIN-ST. JEOR: SCORE: 1427.53

## 2022-02-04 NOTE — PATIENT INSTRUCTIONS
RHEUMATOLOGY    Dr. Nico Byers    [address] [address]  Phone number: [phone]  Fax number: [phone]      Thank you for choosing [facility]!    Samantha Morales CMA Rheumatology        [facility]  [facility]  [address] [place] [address], [address], [place] [address]  Phone: [phone]     [facility] [place] [address], [place] [address]  Phone: [phone]

## 2022-02-04 NOTE — PROGRESS NOTES
Rheumatology Clinic Visit      Ginger Marshall MRN# [id number]   YOB: 1940 Age: 81 year old      Date of visit: 2/04/22   PCP: Dahiana Flores     Chief Complaint   Patient presents with:  Rheumatoid Arthritis    Assessment and Plan     1. Seropositive Erosive Rheumatoid Arthritis ( [2009], CCP >100 [2009]; hx of rheumatoid nodule by 6/14/2011 pathology): Previously failed remicade (staph infection during therapy, but was immediately after a joint injection), orencia (migraines), HCQ (ineffective), Xeljanz (partially effective).  Sulfa allergy.  Currently on methotrexate 25 mg SQ once weekly (dose reduction in the past resulted in more symptoms), leucovorin 5 mg weekly (resolved nasal sore that didn't improve with higher daily folic acid doses), and Rinvoq 15mg daily.  RA well controlled since changing to Rinvoq.   Chronic illness, stable.    - Continue methotrexate 25mg SQ once weekly  - Continue leucovorin 5 mg every 7 days, 24 hours after MTX dose.   - Continue Rinvoq 15 mg daily  - Labs today CBC, Creatinine, Hepatic Panel, ESR, CRP  - Labs every 8-12 weeks: CBC, creatinine, hepatic panel, ESR, CRP              Rapid 3, cumulative scores                      10/13/2021:  15.7    High risk medication requiring intensive toxicity monitoring at least quarterly: labs ordered include CBC, Creatinine, Hepatic panel to monitor for cytopenia and hepatotoxicity; checking creatinine as it affects clearance of medication.      2. 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  Doing okay at this time.     3. Right hip pain: Status post WALTER twice in the past. Followed by [facility].     4. 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 Michele who is now at [facility].  Has been seen at [facility] and [place] [facility].  She reports that a TENS device is  helpful.     5. Osteoporosis: was previously managed by endocrinology and she received reclast once in 2013, 2014, 2016. 12/12/2018 DEXA ordered by Dr. Vázquez showed osteoporosis.  Reclast was started with the first dose on 3/18/2021.  Next dose due in March 2022, to be 1 year from the last dose.  She reports no plans for upcoming invasive dental work.  Chronic illness, stable.    - Reclast once yearly; next due on 3/18/2022; phone number again provided so that she may call to schedule   - Continue vitamin D 1000 IU daily  - Continue calcium 1200mg daily              6. Left 1st toe pain, history: 2 episodes of sudden onset left toe pain followed by diffuse left foot pain that made ambulation difficult.  Also with nodules over both Achilles tendons and the right elbow that are either rheumatoid nodules or tophi.  She reports having history of gout decades ago.  Denies ever being on colchicine or allopurinol.  Discussed colchicine to use if suspected gout flare occurs.  No flares since last seen so has not used colchicine.  - Colchicine: (MITIGARE) 0.6 MG capsule; Take 2 capsules (1.2 mg) by mouth once for 1 dose at the onset of a gout flare, followed by 1 capsule (0.6mg) 1 hour later.      7. Chronic pain syndrome: Documented here for historical significance only.  Doing well at this time.    8.  Vaccinations: Vaccinations reviewed with MsKiesha Joanna.  Risks and benefits of vaccinations were discussed.    - Influenza: up to date  - [id number]: up to date  - [id number]: up to date  - Shingrix: Up to date   - COVID-19: has received the Pfizer COVID-19 vaccine on 2/9/2021 and 3/3/2021; advise receiving a third dose of an mRNA COVID-19 vaccination now, preferably Pfizer to keep with the same  she had before.  Note that she had monoclonal antibodies for COVID-19 infection in early November, and currently she is past the 90 days from that date.  A fourth mRNA COVID-19 vaccination is recommended to be received 5  months after the third dose was administered.  Hold methotrexate and Rinvoq for 1 week after each of the COVID-19 vaccinations.    Total minutes spent in evaluation with patient, documentation, , and review of pertinent studies and chart notes: 18     Ms. Marshall verbalized agreement with and understanding of the rational for the diagnosis and treatment plan.  All questions were answered to best of my ability and the patient's satisfaction. Ms. Marshall was advised to contact the clinic with any questions that may arise after the clinic visit.      Thank you for involving me in the care of the patient    Return to clinic: 3-4 months    HPI   Ginger Marshall is a 81 year old female with a history of multiple foot surgeries, hand tendon transfers, MCP replacements (most recent being in August 2015), bilateral TKA, right WALTER, left distal radius fracture history, gout, s/p lumbar fusion, osteoporosis and seropositive (RF+, CCP+) erosive rheumatoid arthritis who presents for follow-up of rheumatoid arthritis.      Today, 2/4/2022: Rheumatoid arthritis is well controlled per patient; no peripheral joint pain.  Tolerating Rinvoq well.  Changed from Xeljanz to Rinvoq was very effective.  Morning stiffness for no more than 30 minutes.  No joint swelling.  Back pain is better with using a TENS unit.    Denies fevers, chills, nausea, vomiting, constipation, diarrhea. No abdominal pain. No chest pain/pressure, palpitations, or shortness of breath. No oral or nasal sores.   No rash. No LE swelling.     Tobacco: quit in 1999  EtOH: 1 glass of wine every month at most  Drugs: None  Occupation: , retired     ROS   12 point review of system was completed and negative except as noted in the HPI     Active Problem List     Patient Active Problem List   Diagnosis     Rheumatoid arthritis involving right hand with positive rheumatoid factor (H)     History of colonic polyps     Multinodular goiter      CARDIOVASCULAR SCREENING; LDL GOAL LESS THAN 130     Pulmonary nodule     PSEUDOPHAKIA OU     PVD (POSTERIOR VITREOUS DETACHMENT) OU     PXF (PSEUDOEXFOLIATION OF LENS CAPSULE) OD     GERD (gastroesophageal reflux disease)     Hyperlipidemia LDL goal <100     Advance Care Planning     Neuropathy     SHERRY (obstructive sleep apnea)-severe (AHI 35)     zEncounter for counseling     Anemia of chronic disease     Osteoporosis     Cornea guttata, ou     Conjunctival concretions     Stenosis, spinal, lumbar     Chronic pain     Class 1 obesity due to excess calories with serious comorbidity and body mass index (BMI) of 31.0 to 31.9 in adult     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     MGD (meibomian gland dysfunction)     Blepharitis of both eyes     Personal history of healed osteoporosis fracture     Iron malabsorption     Hyperglycemia     Chronic bilateral low back pain without sciatica     Allergic state, subsequent encounter     Anxiety     Low iron     History of depression     DDD (degenerative disc disease), lumbar     Chronic right shoulder pain     Moderate episode of recurrent major depressive disorder (H)     Rheumatic mitral stenosis     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     History of bisphosphonate therapy     Past Medical History     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     Menopause 1989    late 40's     Migraine 04/27/2014    resolved      Multinodular goiter      Other chronic pain     joints     Pelvic fracture (H) 2014     Rheumatic mitral stenosis      Rheumatoid arteritis (H)      S/P lumbar fusion 2017     Sleep apnea      Vitamin B12 deficiency      Past Surgical History     Past Surgical History:   Procedure Laterality Date     ABDOMEN SURGERY      c sections     ARTHROSCOPY KNEE Left      ARTHROSCOPY KNEE RT/LT  2006    left     BACK SURGERY  2013    disc     BIOPSY BREAST       BREAST SURGERY      lumpectomy 's     BREAST SURGERY      lumpectomy     CATARACT EXTRACTION Bilateral      CATARACT IOL, RT/LT Bilateral 2010 aproximately      SECTION  1966      SECTION  1972     COLONOSCOPY  2009,     COLONOSCOPY       FINGER SURGERY Right 2019    Procedure: DISTAL ULNA RESECTION, RIGHT MIDDLE SILASTIC METACARPALPHALANGEAL EXCHANGE AND RIGHT ELBOW NODULE EXCISION.;  Surgeon: Hilario Redmond MD;  Location: [facility];  Service: Orthopedics     FOOT SURGERY Left      GYN SURGERY  66,74     HAND SURGERY Right      HAND SURGERY Right      HC ESOPH/GAS REFLUX TEST W NASAL IMPED >1 HR  2012    Procedure:ESOPHAGEAL IMPEDENCE FUNCTION TEST WITH 24 HOUR PH GREATER THAN 1 HOUR; Surgeon:KAYKAY JULIO; Location:[facility]     IR LUMBAR EPIDURAL STEROID INJECTION       IR TRANSLAMINAR EPIDURAL LUMBAR INJ INCL IMAGING  2012    LESI L5-S1 at [facility]     LUMBAR FUSION       OPTICAL TRACKING SYSTEM FUSION POSTERIOR SPINE LUMBAR N/A 2017    Procedure: OPTICAL TRACKING SYSTEM FUSION SPINE POSTERIOR LUMBAR ONE LEVEL;  Surgeon: Anthony Michele MD;  Location:  OR     ORTHOPEDIC SURGERY      left foot surgery     ORTHOPEDIC SURGERY      R MCP surgery     ORTHOPEDIC SURGERY      right knee total replacement     TOTAL HIP ARTHROPLASTY Right      TOTAL KNEE ARTHROPLASTY Left      TOTAL KNEE ARTHROPLASTY Right      ZZC ANESTH,TOTAL HIP ARTHROPLASTY      Rt hip  "    [facility] HAND/FINGER SURGERY UNLISTED  11/2005    right hand     [facility] TOTAL KNEE ARTHROPLASTY  2006    left     Allergy     Allergies   Allergen Reactions     Abatacept Other (See Comments)     Severe headaches  Migraine     Celebrex [Celecoxib]      Ineffective     Celecoxib Unknown and Nausea     Ethanol      Antihistamines     Orencia [Abatacept]      Headache     Septra [Bactrim]      Sulfa Drugs Nausea and Vomiting     \"deathly ill\"  Allergic to everything with Sulfa in it.     Sulfamethoxazole-Trimethoprim Nausea and Nausea and Vomiting     Tramadol Other (See Comments)     Headache  Migraine headache     Valdecoxib Unknown and Nausea     Made me \"very very ill\", might of been \"cramping\"     Adhesive Tape Rash     Plastic tape  Plastic tapes     Antihistamines, Chlorpheniramine-Type  [Alkylamines] Anxiety and Other (See Comments)     Current Medication List     Current Outpatient Medications   Medication Sig     acetaminophen (TYLENOL) 500 MG tablet Take 2 tablets (1,000 mg) by mouth every 8 hours as needed for pain     atorvastatin (LIPITOR) 40 MG tablet Take 1 tablet (40 mg) by mouth daily     Bioflavonoid Products (VITAMIN C) CHEW      Cholecalciferol (VITAMIN D-3 PO) Take 1,000 Units by mouth 2 times daily     desvenlafaxine (PRISTIQ) 100 MG 24 hr tablet Take 1 tablet (100 mg) by mouth daily     Ferrous Sulfate 324 (65 Fe) MG TBEC Take 324 mg by mouth daily     hydrocortisone, Perianal, (HYDROCORTISONE) 2.5 % cream Place rectally 2 times daily as needed for hemorrhoids     leucovorin (WELLCOVORIN) 5 MG tablet Take 1 tablet (5 mg) by mouth every 7 days . Take 24 hours after taking Methotrexate each week.     Methotrexate, PF, (RASUVO) 25 MG/0.5ML autoinjector Inject 0.5 mLs (25 mg) Subcutaneous every 7 days . Hold for signs of infection, and seek medical attention.     naproxen sodium (ALEVE) 220 MG capsule Take 220 mg by mouth as needed     pantoprazole (PROTONIX) 40 MG EC tablet TAKE ONE TABLET BY MOUTH " "ONCE DAILY 30-60 MINUTES BEFORE A MEAL     sucralfate (CARAFATE) 1 GM tablet TAKE ONE TABLET BY MOUTH FOUR TIMES A DAY AS NEEDED HEATBURN     topiramate (TOPAMAX) 50 MG tablet Take 1.5 tablets (75 mg) by mouth 2 times daily     Upadacitinib ER (RINVOQ) 15 MG TB24 Take 15 mg by mouth daily     valACYclovir (VALTREX) 500 MG tablet Take 1 tablet (500 mg) by mouth 2 times daily     No current facility-administered medications for this visit.     Social History   See HPI    Family History     Family History   Problem Relation Age of Onset     Arthritis Mother      Alzheimer Disease Mother      Hyperlipidemia Mother      Osteoporosis Mother      Heart Disease Father         MI ( from this)     Alcohol/Drug Father      Arthritis Sister      Hypertension Sister      Other Cancer Sister         Luekemia     Cancer Son      Diabetes Son         type 1     Neurologic Disorder Sister         Schizophrenic     Hypertension Sister      Hyperlipidemia Sister      Mental Illness Sister      Diabetes Son      Other Cancer Son      Substance Abuse Son      Glaucoma Daughter      Diabetes Other         type 2     Hyperlipidemia Other      No change in family history since the previous clinic visit.    Physical Exam     Temp Readings from Last 3 Encounters:   22 98.2  F (36.8  C) (Oral)   21 98.2  F (36.8  C) (Oral)   21 97.6  F (36.4  C) (Oral)     BP Readings from Last 5 Encounters:   22 124/72   22 132/78   10/13/21 111/72   10/07/21 116/76   21 124/76     Pulse Readings from Last 1 Encounters:   22 86     Resp Readings from Last 1 Encounters:   21 17     Estimated body mass index is 34.91 kg/m  as calculated from the following:    Height as of this encounter: 1.651 m (5' 5\").    Weight as of this encounter: 95.2 kg (209 lb 12.8 oz).    GEN: NAD. Healthy appearing adult.   HEENT:  Anicteric, noninjected sclera. No obvious external lesions of the ear and nose. Hearing intact.  PULM: " No increased work of breathing  MSK: MCPs and PIPs without synovial swelling or tenderness to palpation.  DIPs without swelling or tenderness to palpation.  Heberden's and Lore's nodes present.  Mild ulnar deviation at the MCPs bilaterally.  Postsurgical changes at the MCPs.  Wrists without swelling or tenderness to palpation.  Elbows and shoulders without swelling or tenderness to palpation.  Knees with medial joint line tenderness and crepitation but no effusion or increased warmth.  Ankles and MTPs without swelling or tenderness to palpation.  SKIN: No rash or jaundice seen  PSYCH: Alert. Appropriate.      Labs     CBC  Recent Labs   Lab Test 12/08/21  1053 10/13/21  1447 08/18/21  1054 05/10/21  1023 02/16/21  1019 11/16/20  1028   WBC 5.1 5.2 5.9 5.0 5.7 5.7   RBC 3.75* 3.71* 3.89 3.39* 3.81 3.91   HGB 12.2 12.0 12.6 11.3* 12.5 12.0   HCT 38.8 36.9 39.2 35.6 38.9 37.8   * 100 101* 105* 102* 97   RDW 15.1* 14.4 13.9 14.3 15.2* 16.1*    365 298 333 335 361   MCH 32.5 32.3 32.4 33.3* 32.8 30.7   MCHC 31.4* 32.5 32.1 31.7 32.1 31.7   NEUTROPHIL 59 53 67 57.7 65.8 60.5   LYMPH 23 25 13 18.5 17.0 20.4   MONOCYTE 14 17 15 17.3 12.1 12.8   EOSINOPHIL 5 6 5 6.3 4.9 6.1   BASOPHIL 0 0 0 0.2 0.2 0.2   ANEU  --   --   --  2.9 3.8 3.5   ALYM  --   --   --  0.9 1.0 1.2   MARYURI  --   --   --  0.9 0.7 0.7   AEOS  --   --   --  0.3 0.3 0.4   ABAS  --   --   --  0.0 0.0 0.0   ANEUTAUTO 3.0 2.8 4.0  --   --   --    ALYMPAUTO 1.2 1.3 0.7*  --   --   --    AMONOAUTO 0.7 0.9 0.9  --   --   --    AEOSAUTO 0.2 0.3 0.3  --   --   --    ABSBASO 0.0 0.0 0.0  --   --   --      CMP  Recent Labs   Lab Test 12/08/21  1053 10/13/21  1447 08/18/21  1054 05/10/21  1023 03/18/21  1350 02/16/21  1019 12/18/20  0923 11/16/20  1028 05/22/20  1026 03/27/20  1413 02/27/20  1117 12/19/19  0957 09/12/19  1019 08/15/19  1118   NA  --   --   --   --   --   --   --   --   --  140  --  142  --  144   POTASSIUM  --   --   --   --   --   --    --   --   --  3.4  --  3.8  --  3.5   CHLORIDE  --   --   --   --   --   --   --   --   --  112*  --  111*  --  112*   CO2  --   --   --   --   --   --   --   --   --  24  --  24  --  25   ANIONGAP  --   --   --   --   --   --   --   --   --  4  --  7  --  7   GLC  --   --   --   --   --   --   --   --   --  98  --  88  --  95   BUN  --   --   --   --   --   --   --   --   --  18  --  10  --  16   CR 0.70 0.71 0.72 0.59  --  0.62  --  0.60   < > 0.57   < > 0.68   < > 0.57   GFRESTIMATED 83 81 80 87  --  86  --  87   < > 89   < > 84   < > 89   GFRESTBLACK  --   --   --  >90  --  >90  --  >90   < > >90   < > >90   < > >90   BRANDEE  --  9.0 9.5  --  9.0  --    < >  --   --  9.1  --  9.4  --  9.6   BILITOTAL 0.3 0.2 0.4 0.3  --  0.4  --  0.4   < >  --    < > 0.3   < >  --    ALBUMIN 3.9 3.7 3.6 3.6  --  4.2  --  4.1   < >  --    < > 4.0   < >  --    PROTTOTAL 7.7 7.8 7.6 7.0  --  8.0  --  7.6   < >  --    < > 7.6   < >  --    ALKPHOS 45 51 45 45  --  44  --  47   < >  --    < > 45   < >  --    AST 19 16 13 22  --  21  --  25   < >  --    < > 19   < >  --    ALT 33 22 23 36  --  34  --  36   < >  --    < > 22   < >  --     < > = values in this interval not displayed.     Calcium/VitaminD  Recent Labs   Lab Test 10/13/21  1447 08/18/21  1054 03/18/21  1350 12/18/20  0923   BRANDEE 9.0 9.5 9.0 9.6   VITDT 62 63  --  48     ESR/CRP  Recent Labs   Lab Test 12/08/21  1053 10/13/21  1447 08/18/21  1054   SED 16 34* 23   CRP <2.9 4.0 <2.9     Lipid Panel  Recent Labs   Lab Test 12/08/21  1053 11/27/20  1043 12/19/19  0957 12/30/15  0842 01/13/14  1109   CHOL 184 188 166   < > 135   TRIG 101 142 113   < > 89   HDL 77 89 89   < > 49*   LDL 87 71 54   < > 68   VLDL  --   --   --   --  18   CHOLHDLRATIO  --   --   --   --  2.8   NHDL 107 99 77   < >  --     < > = values in this interval not displayed.     Hepatitis B  Recent Labs   Lab Test 09/15/15  1159   AUSAB 0.11   HBCAB Nonreactive   HEPBANG Nonreactive     Hepatitis C  Recent Labs  "  Lab Test 09/15/15  1159   HCVAB Nonreactive   Assay performance characteristics have not been established for newborns,   infants, and children       Tuberculosis Screening  Recent Labs   Lab Test 08/18/21  1054 05/07/18  1033 09/15/15  1200   TBRES Negative  --   --    TBRSLT  --  Negative Negative   TBAGN  --  0.00 0.00     \"HAND BILATERAL THREE OR MORE VIEWS 9/15/2015 12:28 PM   HISTORY: Rheumatoid arthritis; establish baseline. Rheumatoid  arthritis(714.0)  COMPARISON: None  IMPRESSION  IMPRESSION: There is diffuse osteopenia. Postoperative changes of the  right second and third metacarpophalangeal joints. Moderate joint  space narrowing involving the left second and third  metacarpophalangeal joints. Mild joint space narrowing of the right  fourth and fifth metacarpophalangeal joints. There are also small  periarticular erosive changes of the metacarpophalangeal joints. There  is fusion of several of the right carpal bones. Marked joint space  loss in the left wrist. Findings are consistent with the clinical  history of rheumatoid arthritis. Chronic fracture deformities of the  right distal radius and ulna. No acute fracture is seen.  SPENCER MONSALVE MD\"    Immunization History     Immunization History   Administered Date(s) Administered     COVID-19,PF,Pfizer (12+ Yrs) 02/09/2021, 03/03/2021     FLU 6-35 months 10/24/2006, 11/14/2007, 10/22/2008, 10/21/2009     Flu, Unspecified 10/20/2013     Influenza (High Dose) 3 valent vaccine 10/28/2013, 09/23/2014, 11/11/2015, 09/23/2016, 09/24/2019     Influenza (IIV3) PF 10/12/1999, 10/26/2001, 10/19/2002, 10/30/2003, 10/28/2004, 10/21/2005, 10/26/2007, 10/21/2009, 10/05/2011, 10/13/2012     Influenza Vaccine IM > 6 months Valent IIV4 (Alfuria,Fluzone) 09/24/2020     Influenza Vaccine Im 4yrs+ 4 Valent CCIIV4 09/28/2017, 09/27/2018     Influenza, Quad, High Dose, Pf, 65yr+ (Fluzone HD) 10/07/2021     Mantoux Tuberculin Skin Test 11/03/2006     Pneumo Conj 13-V " (2010&after) 07/29/2015     Pneumococcal 23 valent 06/26/2000, 10/26/2001, 06/01/2007, 06/02/2010     TD (ADULT, 7+) 12/10/2008, 07/20/2009     Tdap (Adult) Unspecified Formulation 12/12/2018     Zoster vaccine recombinant adjuvanted (SHINGRIX) 12/12/2018, 02/14/2019          Chart documentation done in part with Dragon Voice recognition Software. Although reviewed after completion, some word and grammatical error may remain.    Nico Byers MD

## 2022-02-04 NOTE — NURSING NOTE
RAPID3 (0-30) Cumulative Score  10.7          RAPID3 Weighted Score (divide #4 by 3 and that is the weighted score)  3.5

## 2022-02-06 NOTE — RESULT ENCOUNTER NOTE
Ginger SIM Sweats    Your bone density is stable:  Osteoporosis is present, but no further loss of bone evident since last imaging.     Let Dahiana Abad know if you are interested in resuming therapy such as Reclast.   You are a candidate for therapy based on the results.     Sincerely,       FELICITA ALMENDAREZ M.D.   (for Dahiana Abad)

## 2022-03-17 ENCOUNTER — LAB (OUTPATIENT)
Dept: LAB | Facility: CLINIC | Age: 82
End: 2022-03-17
Attending: INTERNAL MEDICINE
Payer: COMMERCIAL

## 2022-03-17 DIAGNOSIS — Z11.59 SCREENING FOR VIRAL DISEASE: ICD-10-CM

## 2022-03-17 LAB — SARS-COV-2 RNA RESP QL NAA+PROBE: NEGATIVE

## 2022-03-17 PROCEDURE — U0003 INFECTIOUS AGENT DETECTION BY NUCLEIC ACID (DNA OR RNA); SEVERE ACUTE RESPIRATORY SYNDROME CORONAVIRUS 2 (SARS-COV-2) (CORONAVIRUS DISEASE [COVID-19]), AMPLIFIED PROBE TECHNIQUE, MAKING USE OF HIGH THROUGHPUT TECHNOLOGIES AS DESCRIBED BY CMS-2020-01-R: HCPCS

## 2022-03-17 PROCEDURE — U0005 INFEC AGEN DETEC AMPLI PROBE: HCPCS

## 2022-03-21 ENCOUNTER — INFUSION THERAPY VISIT (OUTPATIENT)
Dept: INFUSION THERAPY | Facility: CLINIC | Age: 82
End: 2022-03-21
Attending: INTERNAL MEDICINE
Payer: COMMERCIAL

## 2022-03-21 VITALS
WEIGHT: 210.3 LBS | HEIGHT: 66 IN | BODY MASS INDEX: 33.8 KG/M2 | SYSTOLIC BLOOD PRESSURE: 114 MMHG | HEART RATE: 83 BPM | OXYGEN SATURATION: 93 % | TEMPERATURE: 97.8 F | RESPIRATION RATE: 18 BRPM | DIASTOLIC BLOOD PRESSURE: 76 MMHG

## 2022-03-21 DIAGNOSIS — M81.0 OSTEOPOROSIS, UNSPECIFIED OSTEOPOROSIS TYPE, UNSPECIFIED PATHOLOGICAL FRACTURE PRESENCE: ICD-10-CM

## 2022-03-21 DIAGNOSIS — Z92.29 HISTORY OF BISPHOSPHONATE THERAPY: Primary | ICD-10-CM

## 2022-03-21 LAB
CALCIUM SERPL-MCNC: 9.3 MG/DL (ref 8.5–10.1)
CREAT SERPL-MCNC: 0.69 MG/DL (ref 0.52–1.04)
GFR SERPL CREATININE-BSD FRML MDRD: 88 ML/MIN/1.73M2

## 2022-03-21 PROCEDURE — 250N000011 HC RX IP 250 OP 636: Performed by: INTERNAL MEDICINE

## 2022-03-21 PROCEDURE — 258N000003 HC RX IP 258 OP 636: Performed by: INTERNAL MEDICINE

## 2022-03-21 PROCEDURE — 96365 THER/PROPH/DIAG IV INF INIT: CPT

## 2022-03-21 PROCEDURE — 36415 COLL VENOUS BLD VENIPUNCTURE: CPT | Performed by: INTERNAL MEDICINE

## 2022-03-21 PROCEDURE — 82310 ASSAY OF CALCIUM: CPT | Performed by: INTERNAL MEDICINE

## 2022-03-21 PROCEDURE — 82565 ASSAY OF CREATININE: CPT | Performed by: INTERNAL MEDICINE

## 2022-03-21 RX ORDER — DIPHENHYDRAMINE HYDROCHLORIDE 50 MG/ML
50 INJECTION INTRAMUSCULAR; INTRAVENOUS
Status: CANCELLED
Start: 2022-03-21

## 2022-03-21 RX ORDER — ALBUTEROL SULFATE 0.83 MG/ML
2.5 SOLUTION RESPIRATORY (INHALATION)
Status: CANCELLED | OUTPATIENT
Start: 2022-03-21

## 2022-03-21 RX ORDER — HEPARIN SODIUM,PORCINE 10 UNIT/ML
5 VIAL (ML) INTRAVENOUS
Status: CANCELLED | OUTPATIENT
Start: 2022-03-21

## 2022-03-21 RX ORDER — ZOLEDRONIC ACID 5 MG/100ML
5 INJECTION, SOLUTION INTRAVENOUS ONCE
Status: CANCELLED
Start: 2022-03-21 | End: 2022-03-21

## 2022-03-21 RX ORDER — EPINEPHRINE 1 MG/ML
0.3 INJECTION, SOLUTION INTRAMUSCULAR; SUBCUTANEOUS EVERY 5 MIN PRN
Status: CANCELLED | OUTPATIENT
Start: 2022-03-21

## 2022-03-21 RX ORDER — ZOLEDRONIC ACID 5 MG/100ML
5 INJECTION, SOLUTION INTRAVENOUS ONCE
Status: COMPLETED | OUTPATIENT
Start: 2022-03-21 | End: 2022-03-21

## 2022-03-21 RX ORDER — HEPARIN SODIUM (PORCINE) LOCK FLUSH IV SOLN 100 UNIT/ML 100 UNIT/ML
5 SOLUTION INTRAVENOUS
Status: CANCELLED | OUTPATIENT
Start: 2022-03-21

## 2022-03-21 RX ORDER — METHYLPREDNISOLONE SODIUM SUCCINATE 125 MG/2ML
125 INJECTION, POWDER, LYOPHILIZED, FOR SOLUTION INTRAMUSCULAR; INTRAVENOUS
Status: CANCELLED
Start: 2022-03-21

## 2022-03-21 RX ORDER — MEPERIDINE HYDROCHLORIDE 25 MG/ML
25 INJECTION INTRAMUSCULAR; INTRAVENOUS; SUBCUTANEOUS EVERY 30 MIN PRN
Status: CANCELLED | OUTPATIENT
Start: 2022-03-21

## 2022-03-21 RX ORDER — ALBUTEROL SULFATE 90 UG/1
1-2 AEROSOL, METERED RESPIRATORY (INHALATION)
Status: CANCELLED
Start: 2022-03-21

## 2022-03-21 RX ORDER — NALOXONE HYDROCHLORIDE 0.4 MG/ML
0.2 INJECTION, SOLUTION INTRAMUSCULAR; INTRAVENOUS; SUBCUTANEOUS
Status: CANCELLED | OUTPATIENT
Start: 2022-03-21

## 2022-03-21 RX ADMIN — ZOLEDRONIC ACID 5 MG: 0.05 INJECTION, SOLUTION INTRAVENOUS at 11:40

## 2022-03-21 RX ADMIN — SODIUM CHLORIDE 250 ML: 9 INJECTION, SOLUTION INTRAVENOUS at 11:39

## 2022-03-21 ASSESSMENT — PAIN SCALES - GENERAL: PAINLEVEL: NO PAIN (0)

## 2022-03-21 NOTE — PROGRESS NOTES
Infusion Nursing Note:  Ginger Marshall presents today for Reclast.  Patient seen by provider today: No   present during visit today: Not Applicable.    Note: N/A.      Intravenous Access:  Labs drawn without difficulty.  Peripheral IV placed.    Treatment Conditions:  Results reviewed, labs MET treatment parameters, ok to proceed with treatment.  Calcium 9.3        Post Infusion Assessment:  Patient tolerated infusion without incident.  Blood return noted pre and post infusion.  Site patent and intact, free from redness, edema or discomfort.  No evidence of extravasations.  Access discontinued per protocol.       Discharge Plan:   Discharge instructions reviewed with: Patient.  Patient and/or family verbalized understanding of discharge instructions and all questions answered.  AVS to patient via [employer].  Patient will return 4/29 for labs.  Patient discharged in stable condition accompanied by: self.  Departure Mode: Ambulatory.      Sera Matta RN

## 2022-03-23 ENCOUNTER — ANCILLARY PROCEDURE (OUTPATIENT)
Dept: GENERAL RADIOLOGY | Facility: CLINIC | Age: 82
End: 2022-03-23
Attending: INTERNAL MEDICINE
Payer: COMMERCIAL

## 2022-03-23 ENCOUNTER — OFFICE VISIT (OUTPATIENT)
Dept: FAMILY MEDICINE | Facility: CLINIC | Age: 82
End: 2022-03-23
Payer: COMMERCIAL

## 2022-03-23 VITALS
RESPIRATION RATE: 17 BRPM | WEIGHT: 210.9 LBS | TEMPERATURE: 98.2 F | OXYGEN SATURATION: 95 % | BODY MASS INDEX: 34.3 KG/M2 | SYSTOLIC BLOOD PRESSURE: 118 MMHG | HEART RATE: 78 BPM | DIASTOLIC BLOOD PRESSURE: 60 MMHG

## 2022-03-23 DIAGNOSIS — M54.42 BILATERAL LOW BACK PAIN WITH LEFT-SIDED SCIATICA, UNSPECIFIED CHRONICITY: ICD-10-CM

## 2022-03-23 DIAGNOSIS — M25.551 HIP PAIN, RIGHT: ICD-10-CM

## 2022-03-23 DIAGNOSIS — M25.551 HIP PAIN, RIGHT: Primary | ICD-10-CM

## 2022-03-23 PROCEDURE — 99214 OFFICE O/P EST MOD 30 MIN: CPT | Performed by: INTERNAL MEDICINE

## 2022-03-23 PROCEDURE — 73502 X-RAY EXAM HIP UNI 2-3 VIEWS: CPT | Mod: FY | Performed by: RADIOLOGY

## 2022-03-23 RX ORDER — HYDROCODONE BITARTRATE AND ACETAMINOPHEN 5; 325 MG/1; MG/1
1-2 TABLET ORAL DAILY PRN
COMMUNITY
Start: 2021-04-13 | End: 2022-07-12

## 2022-03-23 RX ORDER — NAPROXEN SODIUM 220 MG
2 TABLET ORAL 2 TIMES DAILY PRN
COMMUNITY
End: 2022-08-02

## 2022-03-23 RX ORDER — BUSPIRONE HYDROCHLORIDE 10 MG/1
1 TABLET ORAL DAILY
COMMUNITY
End: 2022-07-12

## 2022-03-23 RX ORDER — TIZANIDINE 2 MG/1
1 TABLET ORAL 3 TIMES DAILY PRN
COMMUNITY
End: 2022-10-25

## 2022-03-23 RX ORDER — ACETAMINOPHEN 500 MG
500-1000 TABLET ORAL 2 TIMES DAILY
COMMUNITY
Start: 2022-03-23 | End: 2023-01-30

## 2022-03-23 RX ORDER — CETIRIZINE HYDROCHLORIDE 10 MG/1
1 TABLET ORAL DAILY PRN
COMMUNITY
End: 2022-08-12

## 2022-03-23 RX ORDER — PREGABALIN 50 MG/1
1 CAPSULE ORAL 2 TIMES DAILY
COMMUNITY
Start: 2022-03-01 | End: 2022-10-25

## 2022-03-23 ASSESSMENT — PAIN SCALES - GENERAL: PAINLEVEL: EXTREME PAIN (8)

## 2022-03-23 NOTE — PROGRESS NOTES
Pt signed an YAMILEX for her eye exam at [facility]    Assessment & Plan     (M25.551) Hip pain, right  (primary encounter diagnosis)  Comment: right hip pain; independent review of XRay with pt at appt. - R-WALTER in place which appears to be well positioned. Lower lumbar fusion noted.  Exam suggestive of greater trochanteric bursitis; referral to Ortho to consider injection into the bursa.   Plan: XR Pelvis and Hip Right 1 View, Orthopedic          Referral, acetaminophen (TYLENOL) 500        MG tablet          (M54.42) Bilateral low back pain with left-sided sciatica, unspecified chronicity  Comment: low back issues   Plan: physical therpay     Ordering of each unique test  Prescription drug management  30 minutes spent on the date of the encounter doing chart review, history and exam, documentation and further activities per the note       MEDICATIONS:   Orders Placed This Encounter   Medications     busPIRone (BUSPAR) 10 MG tablet     Sig: Take 1 tablet by mouth daily     cetirizine (ZYRTEC) 10 MG tablet     Sig: Take 1 tablet by mouth daily as needed     HYDROcodone-acetaminophen (NORCO) 5-325 MG tablet     Sig: Take 1-2 tablets by mouth daily as needed     naproxen sodium (ALEVE) 220 MG tablet     Sig: Take 2 tablets by mouth 2 times daily as needed     pregabalin (LYRICA) 50 MG capsule     Sig: Take 1 capsule by mouth 2 times daily     tiZANidine (ZANAFLEX) 2 MG tablet     Sig: Take 1 tablet by mouth 3 times daily as needed     acetaminophen (TYLENOL) 500 MG tablet     Sig: Take 1-2 tablets (500-1,000 mg) by mouth 2 times daily          - Continue other medications without change  CONSULTATION/REFERRAL to Orthopedics  Regular exercise    Return in about 10 months (around 1/20/2023) for Wellness visit; sooner if symptoms fail to improve after Ortho assessment.    Gabriela Jefferson MD  Internal Medicine   [facility] RAMANA Miles is a 81 year old who  presents for the following health issues     Musculoskeletal Problem    History of Present Illness       Reason for visit:  Sever pain in rat hip  Symptom onset:  1-3 days ago  Symptom intensity:  Severe  Symptom progression:  Staying the same  Had these symptoms before:  No  What makes it worse:  No  What makes it better:  I tried a muscle relaxer and ice    She eats 0-1 servings of fruits and vegetables daily.She consumes 0 sweetened beverage(s) daily.She exercises with enough effort to increase her heart rate 9 or less minutes per day.  She exercises with enough effort to increase her heart rate 3 or less days per week.   She is taking medications regularly.     Pt presents with Severe right hip for past several days.    no trauma    Chart reviewed.   MRI of Pelvis done 9/24/2021   Identifies healed rami fracture and Right WALTER ~2010 9/21/2021 saw [facility] for left hip pain and sciatica  Now involves right hip      Review of Systems   CONSTITUTIONAL: NEGATIVE for fever, chills, change in weight  RESP: NEGATIVE for significant cough or SOB  CV: NEGATIVE for chest pain, palpitations or peripheral edema  GI: NEGATIVE for nausea, abdominal pain, heartburn, or change in bowel habits  MUSCULOSKELETAL: hx of lumbar fusion surgery;  Hx of RTHA ~2010  NEURO: hx of left sciatic pain and lumbar fusion surgery  PSYCHIATRIC: NEGATIVE for changes in mood or affect      Objective    /60   Pulse 78   Temp 98.2  F (36.8  C) (Oral)   Resp 17   Wt 95.7 kg (210 lb 14.4 oz)   SpO2 95%   BMI 34.30 kg/m    Body mass index is 34.3 kg/m .  Physical Exam   GENERAL: healthy, alert and no distress  NECK: no adenopathy, no asymmetry, masses, or scars and thyroid normal to palpation  RESP: lungs clear to auscultation - no rales, rhonchi or wheezes  CV: regular rates and rhythm, normal S1 S2, no S3 or S4, peripheral pulses strong and no peripheral edema  ABDOMEN: soft, nontender, no hepatosplenomegaly, no masses and bowel sounds  normal  MS: old scar from previous R WALTER;  Tenderness of right  Greater trochanter with palpation;  Gait limited by pain of right hip;   NEURO: Normal strength and tone, sensory exam grossly normal and mentation intact  PSYCH: mentation appears normal, affect normal/bright    Xray - Reviewed and interpreted by me independently.  Right WALTER noted- appears well positioned, as well as evidence in lumbar fusion

## 2022-03-25 ASSESSMENT — HOOS JR
HOOS JR TOTAL INTERVAL SCORE: 76.78
GOING UP OR DOWN STAIRS: MODERATE
RISING FROM SITTING: MILD
WALKING ON UNEVEN SURFACE: MILD

## 2022-03-28 ENCOUNTER — OFFICE VISIT (OUTPATIENT)
Dept: ORTHOPEDICS | Facility: CLINIC | Age: 82
End: 2022-03-28
Attending: INTERNAL MEDICINE
Payer: COMMERCIAL

## 2022-03-28 DIAGNOSIS — M25.551 HIP PAIN, RIGHT: ICD-10-CM

## 2022-03-28 DIAGNOSIS — M70.61 GREATER TROCHANTERIC BURSITIS OF RIGHT HIP: Primary | ICD-10-CM

## 2022-03-28 PROCEDURE — 99213 OFFICE O/P EST LOW 20 MIN: CPT | Performed by: PHYSICIAN ASSISTANT

## 2022-03-28 NOTE — PROGRESS NOTES
HISTORY OF PRESENT ILLNESS:    Ginger Marshall is a 81 year old female who is seen in consultation at the request of Dr. Jefferson for Right hip pain.    Present symptoms:  Symptoms began  1 week(s) ago.  Patient describes injury as woke up in morning and had pain while getting out of bed, denies trauma or injury.  Of note, pain resolved by 3 days later and currently has not symptoms but is worried she may have done something, hx of R WALTER in 2010?  (At the time of incident:) She reports stabbing and sharp right hip pain that is located at the anterior crease; radiation Absent. Pain is 6/10 in maximal severity, and 2/10 at least.  Symptoms are generally worse with/at use/WB; better with/at sitting/non WB.   Overall the patient feels the condition is improved. Other treatment so far has consisted of No Treatment tried to date.  Associated symptoms: has not had swelling, locking, or catching; denies buckling episodes or instability sensation.  She denies history of similar or related problems.  On questioning states she is not very active and only sleeps on the right side.  Orthopedic PMH: Right WALETR 2012 per record, TKA 2009, hx of lumbar fusion 4/03/2017, Rheumatoid arthritis since 31 yo with multiple hand and foot surgeries.   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     Menopause 1989    late 40's     Migraine 04/27/2014    resolved     Multinodular goiter      Other chronic pain     joints     Pelvic fracture  (H) 2014     Rheumatic mitral stenosis      Rheumatoid arteritis (H)      S/P lumbar fusion 2017     Sleep apnea      Vitamin B12 deficiency        Past Surgical History:   Procedure Laterality Date     ABDOMEN SURGERY      c sections     ARTHROSCOPY KNEE Left      ARTHROSCOPY KNEE RT/LT  2006    left     BACK SURGERY  2013    disc     BIOPSY BREAST       BREAST SURGERY      lumpectomy 's     BREAST SURGERY      lumpectomy     CATARACT EXTRACTION Bilateral      CATARACT IOL, RT/LT Bilateral 2010 aproximately      SECTION  1966      SECTION  1972     COLONOSCOPY  2009,     COLONOSCOPY       FINGER SURGERY Right 2019    Procedure: DISTAL ULNA RESECTION, RIGHT MIDDLE SILASTIC METACARPALPHALANGEAL EXCHANGE AND RIGHT ELBOW NODULE EXCISION.;  Surgeon: Hilario Redmond MD;  Location: [facility];  Service: Orthopedics     FOOT SURGERY Left      GYN SURGERY  66,72     HAND SURGERY Right      HAND SURGERY Right      HC ESOPH/GAS REFLUX TEST W NASAL IMPED >1 HR  2012    Procedure:ESOPHAGEAL IMPEDENCE FUNCTION TEST WITH 24 HOUR PH GREATER THAN 1 HOUR; Surgeon:KAYKAY JULIO; Location:[facility]     IR LUMBAR EPIDURAL STEROID INJECTION       IR TRANSLAMINAR EPIDURAL LUMBAR INJ INCL IMAGING  2012    LESI L5-S1 at [facility]     LUMBAR FUSION       OPTICAL TRACKING SYSTEM FUSION POSTERIOR SPINE LUMBAR N/A 2017    Procedure: OPTICAL TRACKING SYSTEM FUSION SPINE POSTERIOR LUMBAR ONE LEVEL;  Surgeon: Anthony Michele MD;  Location:  OR     ORTHOPEDIC SURGERY      left foot surgery     ORTHOPEDIC SURGERY      R MCP surgery     ORTHOPEDIC SURGERY      right knee total replacement     TOTAL HIP ARTHROPLASTY Right      TOTAL KNEE ARTHROPLASTY Left      TOTAL KNEE ARTHROPLASTY Right      ZZC ANESTH,TOTAL HIP ARTHROPLASTY      Rt hip     ZZC HAND/FINGER SURGERY UNLISTED  2005    right hand     ZZC TOTAL KNEE ARTHROPLASTY  2006     left       Family History   Problem Relation Age of Onset     Arthritis Mother      Alzheimer Disease Mother      Hyperlipidemia Mother      Osteoporosis Mother      Heart Disease Father         MI ( from this)     Alcohol/Drug Father      Arthritis Sister      Hypertension Sister      Other Cancer Sister         Luekemia     Cancer Son      Diabetes Son         type 1     Neurologic Disorder Sister         Schizophrenic     Hypertension Sister      Hyperlipidemia Sister      Mental Illness Sister      Diabetes Son      Other Cancer Son      Substance Abuse Son      Glaucoma Daughter      Diabetes Other         type 2     Hyperlipidemia Other        Social History     Socioeconomic History     Marital status: Single     Spouse name: Not on file     Number of children: 3     Years of education: Not on file     Highest education level: Not on file   Occupational History     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   Tobacco Use     Smoking status: Former Smoker     Packs/day: 1.00     Years: 41.00     Pack years: 41.00     Types: Cigarettes     Quit date: 1999     Years since quittin.2     Smokeless tobacco: Never Used     Tobacco comment: former smoker   Vaping Use     Vaping Use: Never used   Substance and Sexual Activity     Alcohol use: Yes     Alcohol/week: 0.0 standard drinks     Comment: Occasionally,  usually wine     Drug use: No     Sexual activity: Not Currently     Partners: Male     Comment: To old for all that   Other Topics Concern     Parent/sibling w/ CABG, MI or angioplasty before 65F 55M? No      Service Not Asked     Blood Transfusions Not Asked     Caffeine Concern Yes     Comment: She has 8 cups of coffee in the AM and is done by 8-8:30 AM.     Occupational Exposure Not Asked     Hobby Hazards Not Asked     Sleep Concern Not Asked     Stress Concern Not Asked     Weight Concern Not Asked     Special Diet Not Asked     Back Care Not Asked     Exercise Yes      Comment: Sometimes.  Gordo Redd comes 3 times a week to her building.     Bike Helmet Not Asked     Seat Belt Not Asked     Self-Exams Not Asked   Social History Narrative    She lives in a a senior living apartment building.     Social Determinants of Health     Financial Resource Strain: Not on file   Food Insecurity: Not on file   Transportation Needs: Not on file   Physical Activity: Not on file   Stress: Not on file   Social Connections: Not on file   Intimate Partner Violence: Not on file   Housing Stability: Not on file       Current Outpatient Medications   Medication Sig Dispense Refill     acetaminophen (TYLENOL) 500 MG tablet Take 1-2 tablets (500-1,000 mg) by mouth 2 times daily       acetaminophen (TYLENOL) 500 MG tablet Take 2 tablets (1,000 mg) by mouth every 8 hours as needed for pain 100 tablet 0     atorvastatin (LIPITOR) 40 MG tablet Take 1 tablet (40 mg) by mouth daily 90 tablet 3     Bioflavonoid Products (VITAMIN C) CHEW        busPIRone (BUSPAR) 10 MG tablet Take 1 tablet by mouth daily       cetirizine (ZYRTEC) 10 MG tablet Take 1 tablet by mouth daily as needed       Cholecalciferol (VITAMIN D-3 PO) Take 1,000 Units by mouth 2 times daily       desvenlafaxine (PRISTIQ) 100 MG 24 hr tablet Take 1 tablet (100 mg) by mouth daily 90 tablet 1     Ferrous Sulfate 324 (65 Fe) MG TBEC Take 324 mg by mouth daily (Patient not taking: Reported on 3/23/2022)       HYDROcodone-acetaminophen (NORCO) 5-325 MG tablet Take 1-2 tablets by mouth daily as needed       hydrocortisone, Perianal, (HYDROCORTISONE) 2.5 % cream Place rectally 2 times daily as needed for hemorrhoids 30 g 1     leucovorin (WELLCOVORIN) 5 MG tablet Take 1 tablet (5 mg) by mouth every 7 days . Take 24 hours after taking Methotrexate each week. 13 tablet 2     Methotrexate, PF, (RASUVO) 25 MG/0.5ML autoinjector Inject 0.5 mLs (25 mg) Subcutaneous every 7 days . Hold for signs of infection, and seek medical attention. 2 mL 6      "naproxen sodium (ALEVE) 220 MG tablet Take 2 tablets by mouth 2 times daily as needed       pantoprazole (PROTONIX) 40 MG EC tablet TAKE ONE TABLET BY MOUTH ONCE DAILY 30-60 MINUTES BEFORE A MEAL 90 tablet 1     pregabalin (LYRICA) 50 MG capsule Take 1 capsule by mouth 2 times daily       sucralfate (CARAFATE) 1 GM tablet TAKE ONE TABLET BY MOUTH FOUR TIMES A DAY AS NEEDED HEATBURN 120 tablet 1     tiZANidine (ZANAFLEX) 2 MG tablet Take 1 tablet by mouth 3 times daily as needed       topiramate (TOPAMAX) 50 MG tablet Take 1.5 tablets (75 mg) by mouth 2 times daily 270 tablet 11     Upadacitinib ER (RINVOQ) 15 MG TB24 Take 15 mg by mouth daily 30 tablet 3     valACYclovir (VALTREX) 500 MG tablet Take 1 tablet (500 mg) by mouth 2 times daily (Patient taking differently: Take 500 mg by mouth 2 times daily as needed ) 180 tablet 1       Allergies   Allergen Reactions     Abatacept Other (See Comments)     Severe headaches  Migraine     Celebrex [Celecoxib]      Ineffective     Celecoxib Unknown and Nausea     Ethanol      Antihistamines     Orencia [Abatacept]      Headache     Septra [Bactrim]      Sulfa Drugs Nausea and Vomiting     \"deathly ill\"  Allergic to everything with Sulfa in it.     Sulfamethoxazole-Trimethoprim Nausea and Nausea and Vomiting     Tramadol Other (See Comments)     Headache  Migraine headache     Valdecoxib Unknown and Nausea     Made me \"very very ill\", might of been \"cramping\"     Adhesive Tape Rash     Plastic tape  Plastic tapes     Antihistamines, Chlorpheniramine-Type  [Alkylamines] Anxiety and Other (See Comments)       Patient's past medical, surgical, social and family histories are reviewed today.  Past medical history is notable for: CKD, Rheumatoid Arthritis, Obesity, taken into consideration today.  REVIEW OF SYSTEMS:  NO new changes to:  CONSTITUTIONAL:  NEGATIVE for fever, chills, change in weight  INTEGUMENTARY/SKIN:  NEGATIVE for worrisome rashes, moles or lesions  EYES:  " NEGATIVE for vision changes or irritation  ENT/MOUTH:  NEGATIVE for ear, mouth and throat problems  RESP:  NEGATIVE for significant cough or SOB  BREAST:  NEGATIVE for masses, tenderness or discharge  CV:  NEGATIVE for chest pain, palpitations or peripheral edema  GI:  NEGATIVE for nausea, abdominal pain, heartburn, or change in bowel habits  :  Negative   MUSCULOSKELETAL:  See HPI above  NEURO:  NEGATIVE for weakness, dizziness or paresthesias  ENDOCRINE:  NEGATIVE for temperature intolerance, skin/hair changes  HEME/ALLERGY/IMMUNE:  NEGATIVE for bleeding problems  PSYCHIATRIC:  NEGATIVE for changes in mood or affect      PHYSICAL EXAM:  There were no vitals taken for this visit.  There is no height or weight on file to calculate BMI.   GENERAL APPEARANCE: healthy, well nourished. Obese.   NEURO: she is alert and oriented x 3, mentation intact and speech normal  POSTURE: Stands with normal weight bearing line.  PSYCH:  mentation appears normal and affect normal/bright    MUSCULOSKELETAL: Examination of Right hip.  GROSS OBSERVATION:  Well healed scar, no other lesions or deformities.  Walks with slow gait, left hip limp.  PALPATION: sharp point tenderness over right (and left) greater trochanter.  No pain at hip crease, glutes, abductors.  ROM:    Right:    .  Left:    Flexion  100  120   Ext  20  20   IR  30  30   ER  50  50  LIGAMENTOUS:  Intact.  STRENGTH:  Flexion with mild weakness at right otherwise, ext, IR, ABD, ADD and ER grossly intact, 5/5 bilaterally.  SPECIAL TESTS:   Fadir and Fabir not performed due to WALTER.    CONTRALATERAL JOINT:  no overlying skin change, observable deformity, or effusion; range of motion as noted above; strength and function are within normal limits; no obvious ligamentous instability.     SKIN: as above, no lesions, erythema noted bilaterally.  VASCULATURE:  Good capillary refill bilaterally.  SENSATION: light touch intact to LE, Otherwise no gross deficits noted.    COORDINATION:  Able to move joint within above stated ROM with good control.    Imaging Interpretation:   XR from 3/23/22 of AP pelvis and right hip reviewed.    Images demonstrate the postoperative changes of a right total hip arthroplasty.  Components appear well seated with no overt signs of loosening.  No fractures or stress reaction noted.  There may be slight off center rotation of the head in the acetabulum indicating minor wear, but not significant.  Position of acetabular component is difficult to assess without cross table, but may be protruding anteriorly.   Left hip degenerative changes as previously noted.  No significant changes in implant from  1/15/22 images.      ASSESSMENT:  1. Greater trochanteric bursitis of right hip    2. Hip pain, right      We discussed it could be a radiating Gr trochanteric bursitis given the hx of sleeping on the right side, vs a tendon impingement, though odd to start 10+ years after surgery.  Regardless, due to symptoms resolving, will monitor, treat GTB symptoms at home and follow up as needed.    PLAN:    1.  Ice Gr Troch, avoid pressure.  2.  Monitor symptoms.    Follow up in clinic as needed.    A total of 20 minutes spent with patient on care and care coordinating activities.      Jatin Saldivar PA-C  [facility] - Surgery

## 2022-03-28 NOTE — LETTER
3/28/2022         RE: Ginger Marshall  [address]  Count includes the [facility] [address]        Dear Colleague,    Thank you for referring your patient, Ginger Marshall, to the [facility] [place]. Please see a copy of my visit note below.    HISTORY OF PRESENT ILLNESS:    Ginger Marshall is a 81 year old female who is seen in consultation at the request of Dr. Jefferson for Right hip pain.    Present symptoms:  Symptoms began  1 week(s) ago.  Patient describes injury as woke up in morning and had pain while getting out of bed, denies trauma or injury.  Of note, pain resolved by 3 days later and currently has not symptoms but is worried she may have done something, hx of R WALTER in 2010?  (At the time of incident:) She reports stabbing and sharp right hip pain that is located at the anterior crease; radiation Absent. Pain is 6/10 in maximal severity, and 2/10 at least.  Symptoms are generally worse with/at use/WB; better with/at sitting/non WB.   Overall the patient feels the condition is improved. Other treatment so far has consisted of No Treatment tried to date.  Associated symptoms: has not had swelling, locking, or catching; denies buckling episodes or instability sensation.  She denies history of similar or related problems.  On questioning states she is not very active and only sleeps on the right side.  Orthopedic PMH: Right WALTER 2012 per record, TKA 2009, hx of lumbar fusion 4/03/2017, Rheumatoid arthritis since 31 yo with multiple hand and foot surgeries.   Past Medical History:   Diagnosis Date     Acute posthemorrhagic anemia 10/13/2012     Closed fracture of multiple ribs of left side, initial encounter 11/25/2019     COPD (chronic obstructive pulmonary disease) (H) 1980's    no longer occurring     Ex-smoker 01/1999     Gastroenteritis 03/21/2020    Gastroenteritis with norovirus     Herniated nucleus pulposus, L3-4 3/1/2017     Hip joint replacement by other means 07/10/2008     History  of blood transfusion      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     History of total knee replacement 2009     Menopause 1989    late 40's     Migraine 2014    resolved     Multinodular goiter      Other chronic pain     joints     Pelvic fracture (H) 2014     Rheumatic mitral stenosis      Rheumatoid arteritis (H)      S/P lumbar fusion 2017     Sleep apnea      Vitamin B12 deficiency        Past Surgical History:   Procedure Laterality Date     ABDOMEN SURGERY      c sections     ARTHROSCOPY KNEE Left      ARTHROSCOPY KNEE RT/LT  2006    left     BACK SURGERY  2013    disc     BIOPSY BREAST       BREAST SURGERY      lumpectomy 's     BREAST SURGERY      lumpectomy     CATARACT EXTRACTION Bilateral      CATARACT IOL, RT/LT Bilateral 2010 aproximately      SECTION  1966      SECTION  1972     COLONOSCOPY  2009,     COLONOSCOPY       FINGER SURGERY Right 2019    Procedure: DISTAL ULNA RESECTION, RIGHT MIDDLE SILASTIC METACARPALPHALANGEAL EXCHANGE AND RIGHT ELBOW NODULE EXCISION.;  Surgeon: Hilario Redmond MD;  Location: [facility];  Service: Orthopedics     FOOT SURGERY Left      GYN SURGERY  66,72     HAND SURGERY Right      HAND SURGERY Right      HC ESOPH/GAS REFLUX TEST W NASAL IMPED >1 HR  2012    Procedure:ESOPHAGEAL IMPEDENCE FUNCTION TEST WITH 24 HOUR PH GREATER THAN 1 HOUR; Surgeon:KAYKAY JULIO; Location:[facility]     IR LUMBAR EPIDURAL STEROID INJECTION       IR TRANSLAMINAR EPIDURAL LUMBAR INJ INCL IMAGING  2012    LESI L5-S1 at [facility]     LUMBAR FUSION       OPTICAL TRACKING SYSTEM FUSION POSTERIOR SPINE LUMBAR N/A 2017    Procedure: OPTICAL TRACKING SYSTEM FUSION SPINE POSTERIOR LUMBAR ONE LEVEL;  Surgeon: Anthony Michele MD;  Location:  OR     ORTHOPEDIC SURGERY      left foot surgery     ORTHOPEDIC SURGERY      R MCP surgery     ORTHOPEDIC SURGERY  2007    right knee total  replacement     TOTAL HIP ARTHROPLASTY Right      TOTAL KNEE ARTHROPLASTY Left      TOTAL KNEE ARTHROPLASTY Right      ZZC ANESTH,TOTAL HIP ARTHROPLASTY      Rt hip     ZZC HAND/FINGER SURGERY UNLISTED  2005    right hand     ZZC TOTAL KNEE ARTHROPLASTY  2006    left       Family History   Problem Relation Age of Onset     Arthritis Mother      Alzheimer Disease Mother      Hyperlipidemia Mother      Osteoporosis Mother      Heart Disease Father         MI ( from this)     Alcohol/Drug Father      Arthritis Sister      Hypertension Sister      Other Cancer Sister         Luekemia     Cancer Son      Diabetes Son         type 1     Neurologic Disorder Sister         Schizophrenic     Hypertension Sister      Hyperlipidemia Sister      Mental Illness Sister      Diabetes Son      Other Cancer Son      Substance Abuse Son      Glaucoma Daughter      Diabetes Other         type 2     Hyperlipidemia Other        Social History     Socioeconomic History     Marital status: Single     Spouse name: Not on file     Number of children: 3     Years of education: Not on file     Highest education level: Not on file   Occupational History     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   Tobacco Use     Smoking status: Former Smoker     Packs/day: 1.00     Years: 41.00     Pack years: 41.00     Types: Cigarettes     Quit date: 1999     Years since quittin.2     Smokeless tobacco: Never Used     Tobacco comment: former smoker   Vaping Use     Vaping Use: Never used   Substance and Sexual Activity     Alcohol use: Yes     Alcohol/week: 0.0 standard drinks     Comment: Occasionally,  usually wine     Drug use: No     Sexual activity: Not Currently     Partners: Male     Comment: To old for all that   Other Topics Concern     Parent/sibling w/ CABG, MI or angioplasty before 65F 55M? No      Service Not Asked     Blood Transfusions Not Asked     Caffeine Concern Yes     Comment: She has 8 cups  of coffee in the AM and is done by 8-8:30 AM.     Occupational Exposure Not Asked     Hobby Hazards Not Asked     Sleep Concern Not Asked     Stress Concern Not Asked     Weight Concern Not Asked     Special Diet Not Asked     Back Care Not Asked     Exercise Yes     Comment: Sometimes.  Gordo Redd comes 3 times a week to her building.     Bike Helmet Not Asked     Seat Belt Not Asked     Self-Exams Not Asked   Social History Narrative    She lives in a a senior living apartment building.     Social Determinants of Health     Financial Resource Strain: Not on file   Food Insecurity: Not on file   Transportation Needs: Not on file   Physical Activity: Not on file   Stress: Not on file   Social Connections: Not on file   Intimate Partner Violence: Not on file   Housing Stability: Not on file       Current Outpatient Medications   Medication Sig Dispense Refill     acetaminophen (TYLENOL) 500 MG tablet Take 1-2 tablets (500-1,000 mg) by mouth 2 times daily       acetaminophen (TYLENOL) 500 MG tablet Take 2 tablets (1,000 mg) by mouth every 8 hours as needed for pain 100 tablet 0     atorvastatin (LIPITOR) 40 MG tablet Take 1 tablet (40 mg) by mouth daily 90 tablet 3     Bioflavonoid Products (VITAMIN C) CHEW        busPIRone (BUSPAR) 10 MG tablet Take 1 tablet by mouth daily       cetirizine (ZYRTEC) 10 MG tablet Take 1 tablet by mouth daily as needed       Cholecalciferol (VITAMIN D-3 PO) Take 1,000 Units by mouth 2 times daily       desvenlafaxine (PRISTIQ) 100 MG 24 hr tablet Take 1 tablet (100 mg) by mouth daily 90 tablet 1     Ferrous Sulfate 324 (65 Fe) MG TBEC Take 324 mg by mouth daily (Patient not taking: Reported on 3/23/2022)       HYDROcodone-acetaminophen (NORCO) 5-325 MG tablet Take 1-2 tablets by mouth daily as needed       hydrocortisone, Perianal, (HYDROCORTISONE) 2.5 % cream Place rectally 2 times daily as needed for hemorrhoids 30 g 1     leucovorin (WELLCOVORIN) 5 MG tablet Take 1 tablet (5 mg)  "by mouth every 7 days . Take 24 hours after taking Methotrexate each week. 13 tablet 2     Methotrexate, PF, (RASUVO) 25 MG/0.5ML autoinjector Inject 0.5 mLs (25 mg) Subcutaneous every 7 days . Hold for signs of infection, and seek medical attention. 2 mL 6     naproxen sodium (ALEVE) 220 MG tablet Take 2 tablets by mouth 2 times daily as needed       pantoprazole (PROTONIX) 40 MG EC tablet TAKE ONE TABLET BY MOUTH ONCE DAILY 30-60 MINUTES BEFORE A MEAL 90 tablet 1     pregabalin (LYRICA) 50 MG capsule Take 1 capsule by mouth 2 times daily       sucralfate (CARAFATE) 1 GM tablet TAKE ONE TABLET BY MOUTH FOUR TIMES A DAY AS NEEDED HEATBURN 120 tablet 1     tiZANidine (ZANAFLEX) 2 MG tablet Take 1 tablet by mouth 3 times daily as needed       topiramate (TOPAMAX) 50 MG tablet Take 1.5 tablets (75 mg) by mouth 2 times daily 270 tablet 11     Upadacitinib ER (RINVOQ) 15 MG TB24 Take 15 mg by mouth daily 30 tablet 3     valACYclovir (VALTREX) 500 MG tablet Take 1 tablet (500 mg) by mouth 2 times daily (Patient taking differently: Take 500 mg by mouth 2 times daily as needed ) 180 tablet 1       Allergies   Allergen Reactions     Abatacept Other (See Comments)     Severe headaches  Migraine     Celebrex [Celecoxib]      Ineffective     Celecoxib Unknown and Nausea     Ethanol      Antihistamines     Orencia [Abatacept]      Headache     Septra [Bactrim]      Sulfa Drugs Nausea and Vomiting     \"deathly ill\"  Allergic to everything with Sulfa in it.     Sulfamethoxazole-Trimethoprim Nausea and Nausea and Vomiting     Tramadol Other (See Comments)     Headache  Migraine headache     Valdecoxib Unknown and Nausea     Made me \"very very ill\", might of been \"cramping\"     Adhesive Tape Rash     Plastic tape  Plastic tapes     Antihistamines, Chlorpheniramine-Type  [Alkylamines] Anxiety and Other (See Comments)       Patient's past medical, surgical, social and family histories are reviewed today.  Past medical history is " notable for: CKD, Rheumatoid Arthritis, Obesity, taken into consideration today.  REVIEW OF SYSTEMS:  NO new changes to:  CONSTITUTIONAL:  NEGATIVE for fever, chills, change in weight  INTEGUMENTARY/SKIN:  NEGATIVE for worrisome rashes, moles or lesions  EYES:  NEGATIVE for vision changes or irritation  ENT/MOUTH:  NEGATIVE for ear, mouth and throat problems  RESP:  NEGATIVE for significant cough or SOB  BREAST:  NEGATIVE for masses, tenderness or discharge  CV:  NEGATIVE for chest pain, palpitations or peripheral edema  GI:  NEGATIVE for nausea, abdominal pain, heartburn, or change in bowel habits  :  Negative   MUSCULOSKELETAL:  See HPI above  NEURO:  NEGATIVE for weakness, dizziness or paresthesias  ENDOCRINE:  NEGATIVE for temperature intolerance, skin/hair changes  HEME/ALLERGY/IMMUNE:  NEGATIVE for bleeding problems  PSYCHIATRIC:  NEGATIVE for changes in mood or affect      PHYSICAL EXAM:  There were no vitals taken for this visit.  There is no height or weight on file to calculate BMI.   GENERAL APPEARANCE: healthy, well nourished. Obese.   NEURO: she is alert and oriented x 3, mentation intact and speech normal  POSTURE: Stands with normal weight bearing line.  PSYCH:  mentation appears normal and affect normal/bright    MUSCULOSKELETAL: Examination of Right hip.  GROSS OBSERVATION:  Well healed scar, no other lesions or deformities.  Walks with slow gait, left hip limp.  PALPATION: sharp point tenderness over right (and left) greater trochanter.  No pain at hip crease, glutes, abductors.  ROM:    Right:    .  Left:    Flexion  100  120   Ext  20  20   IR  30  30   ER  50  50  LIGAMENTOUS:  Intact.  STRENGTH:  Flexion with mild weakness at right otherwise, ext, IR, ABD, ADD and ER grossly intact, 5/5 bilaterally.  SPECIAL TESTS:   Fadir and Fabir not performed due to WALTER.    CONTRALATERAL JOINT:  no overlying skin change, observable deformity, or effusion; range of motion as noted above; strength and  function are within normal limits; no obvious ligamentous instability.     SKIN: as above, no lesions, erythema noted bilaterally.  VASCULATURE:  Good capillary refill bilaterally.  SENSATION: light touch intact to LE, Otherwise no gross deficits noted.   COORDINATION:  Able to move joint within above stated ROM with good control.    Imaging Interpretation:   XR from 3/23/22 of AP pelvis and right hip reviewed.    Images demonstrate the postoperative changes of a right total hip arthroplasty.  Components appear well seated with no overt signs of loosening.  No fractures or stress reaction noted.  There may be slight off center rotation of the head in the acetabulum indicating minor wear, but not significant.  Position of acetabular component is difficult to assess without cross table, but may be protruding anteriorly.   Left hip degenerative changes as previously noted.  No significant changes in implant from  1/15/22 images.      ASSESSMENT:  1. Greater trochanteric bursitis of right hip    2. Hip pain, right      We discussed it could be a radiating Gr trochanteric bursitis given the hx of sleeping on the right side, vs a tendon impingement, though odd to start 10+ years after surgery.  Regardless, due to symptoms resolving, will monitor, treat GTB symptoms at home and follow up as needed.    PLAN:    1.  Ice Gr Troch, avoid pressure.  2.  Monitor symptoms.    Follow up in clinic as needed.    A total of 20 minutes spent with patient on care and care coordinating activities.      Jatin Saldivar PA-C  [facility] - Surgery        Again, thank you for allowing me to participate in the care of your patient.        Sincerely,        Jatin Saldivar PA-C

## 2022-03-28 NOTE — PATIENT INSTRUCTIONS
Greater trochanteric bursitis (hip bursitis)    This is a condition of inflammation of the bursa over the outside of your right hip.  To treat this condition you should alleviate the irritation of the bursa by avoiding pressure on the area and decrease the inflammation by:    Ice to the area 20 minutes several times daily without putting pressure (ie do not sit on the ice bag.)    You may take naproxen or ibuprofen as recommended by your primary doctor to decrease the inflammation.    Please follow up with us if the issue does not resolve or becomes more frequently bothersome.

## 2022-04-15 ASSESSMENT — ENCOUNTER SYMPTOMS
DIZZINESS: 1
BACK PAIN: 1

## 2022-04-18 ENCOUNTER — VIRTUAL VISIT (OUTPATIENT)
Dept: ENDOCRINOLOGY | Facility: CLINIC | Age: 82
End: 2022-04-18
Attending: NURSE PRACTITIONER
Payer: COMMERCIAL

## 2022-04-18 DIAGNOSIS — M81.0 OSTEOPOROSIS, UNSPECIFIED OSTEOPOROSIS TYPE, UNSPECIFIED PATHOLOGICAL FRACTURE PRESENCE: ICD-10-CM

## 2022-04-18 DIAGNOSIS — Z87.310 PERSONAL HISTORY OF HEALED OSTEOPOROSIS FRACTURE: Chronic | ICD-10-CM

## 2022-04-18 DIAGNOSIS — Z98.1 S/P LUMBAR FUSION: Primary | ICD-10-CM

## 2022-04-18 PROCEDURE — 99204 OFFICE O/P NEW MOD 45 MIN: CPT | Mod: 95 | Performed by: INTERNAL MEDICINE

## 2022-04-18 NOTE — PATIENT INSTRUCTIONS
[facility]  Dr Smith, Endocrinology Department    [facility]   [address] [facility]  [place] [address]  Appointment [phone]  Fax: [phone]  [place]: Monday - Thursday      You have received RECLAST [id number].  Next DEXA in 2023 (if covered by insurance) or in 2024.  Next Reclast in 3/2023 if next DEXA is in 2024.  Follow up with endocrinology after DEXA.    The pt was advised to  Maintain an adequate calcium and vitamin D intake and to supplement vitamin D if needed to maintain serum levels of 25 hydroxy D (25 OH D) between 30-60 ng/ml.  Limit alcohol intake to no more than 2 servings per day.  Limit caffeine intake.  Maintain an active lifestyle including weight-bearing exercises for at least 30 mins daily.  Take measures to reduce the risk of falling.    You should get 1000- 1200 mg/day calcium in divided doses of no more than 500 mg/dose.  This INCLUDES what is in your food as well as what is in any supplements you may be taking.    Vit D about 800-1000 IU/day ( unless you have vit D deficiency- in that case higher dose)  Dietary sources of calcium:: These also contain vitamin D  Milk                            8 oz            300 mg calcium  Yogurt                          1 cup           400 mg calcium   Hard cheese                     1.5 oz          300 mg  Cottage cheese                  2 cup           300 mg  Orange juice with Calcium       8 oz            300 mg  Low fat dairy sources are recommended      You should get 30 minutes of moderate weight bearing exercise on most days of the week .  Weight bearing exercise includes such things as walking, jogging, hiking, dancing.  You should also get Strength training 2 or more times/week in addition to other weight -being exercise. Strength training uses weight or resistance beyond that seen in everyday activities -(pilates, weight training with free weights, weight machines or  resistance bands)

## 2022-04-18 NOTE — LETTER
"    4/18/2022         RE: Ginger Marshall  [address]  [facility] [address]        Dear Colleague,    Thank you for referring your patient, Ginger Marshall, to the [facility]. Please see a copy of my visit note below.    Ginger is a 81 year old who is being evaluated via a billable video visit.      How would you like to obtain your AVS? MyChart  If the video visit is dropped, the invitation should be resent by: Text to cell phone: [phone]   Will anyone else be joining your video visit? No      THIS IS A VIDEO VISIT:    Phone call visit/virtual visit encounter:    Name of patient: Ginger Marshall    Date of encounter: 4/18/2022    Time of start of video visit: 11:30    Video started: 11:42    Video ended: 12:04    Provider location: working from home/ [facility]    Patient location: patients home.    Mode of transmission: video/ Doximity    Verbal consent: obtained before starting visit. Pt is agreeable.      The patient has been notified of following:      \"This VIDEO visit will be conducted via a call between you and your physician/provider. We have found that certain health care needs can be provided without the need for a physical exam.  This service lets us provide the care you need with a short phone conversation.  If a prescription is necessary we can send it directly to your pharmacy.  If lab work is needed we can place an order for that and you can then stop by our lab to have the test done at a later time.     With new updates with corona virus patient might be billed as clinic visit.     If during the course of the call the physician/provider feels a telephone visit is not appropriate, you will not be charged for this service.\"      Past medical history, social history, family history, allergy and medications were reviewed and updated as appropriate.  Reviewed pertinent labs, notes, imaging studies personally.    Name: Ginger Marshall  Date: 4/18/2022  Seen in " consultation with Gabriela Jefferson for osteoporosis.     HPI:  Ginger Marshall is a 81 year old female who presents for the evaluation of osteoporosis.   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     Was seen by Arlet Hernandez previously.      DEXA 2/2022: Osteoporosis. There has been no significant change in bone density of the hip(s). The left forearm was not included on the previous study so comparison is not possible.    The spine is not acceptable for evaluation due to previous surgical changes.    The right femur is not acceptable for evaluation due to previous arthroplasty.     RISK FACTORS:  Post-menopausal, Height loss of 3.5 inches, Parent history of osteoporosis with a hip fracture, Fractures of wrist and pelvis,  Condition related to bone loss: rheumatoid arthritis, long term steroid treatment, follow up osteoporosis    CURRENT TREATMENT:  Calcium, Vitamin D, previous Reclast.  2013, 2014, 2016.   ( No Reclast [id number])  Last Reclast was 3/2022. ( it was restarted at that time by PCP)    GERD: on PPI. Under good control.    DENTAL: ok. No procedure planned. Has partial dentures.    KIDNEY function: within normal limits    Steroid: h/o  rheumatoid arthritis, was long term steroid treatment, was on prednisone for many  year. She has been on steroid since 1980s Last use was around 2019. Now she is on Methotrexate.    Initially diagnosed with osteopenia in 2010, 2013,2015.  DEXA 2018 c/w osteoporosis.      Smoke:former smoker, quit in 1999.  Family History:Yes: mother and sister with osteoporosis.  Menstrual history/Birthing: s/p menopause. NO HRT. Wrist fractures after fall.  Fractures:h/o pelvic fracture. . She had sacral fracture in 2013. Treated non surgically. H/o non traumatic displaced inferior pubic ramus fracture in 2/2015. H/o right wrist fracture.  Kidney stones: No  GI Surgery:No  Duration of therapy: Reclast as noted above  Exercise: Not much. Walks her dog. Has back pain.  Diet: dairy 1-2 servings/day.  Ca/Vitamin D: calcium- 600 mg once a day , Vit D 1000 international unit(s) BID  Alcohol: social  Eating Disorder: No    PMH/PSH:  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     Menopause 1989    late 40's     Migraine 04/27/2014    resolved     Multinodular goiter      Other chronic pain     joints     Pelvic fracture (H) 05/13/2014     Rheumatic mitral stenosis      Rheumatoid arteritis (H)      S/P lumbar fusion 04/03/2017     Sleep apnea      Vitamin B12 deficiency      Past Surgical History:   Procedure Laterality Date     ABDOMEN SURGERY      c sections     ARTHROSCOPY KNEE Left      ARTHROSCOPY KNEE RT/LT  01/2006    left     BACK SURGERY  2013    disc     BIOPSY BREAST       BREAST SURGERY  1995     lumpectomy 's     BREAST SURGERY      lumpectomy     CATARACT EXTRACTION Bilateral      CATARACT IOL, RT/LT Bilateral 2010 aproximately      SECTION  1966      SECTION  1972     COLONOSCOPY  2009,     COLONOSCOPY       FINGER SURGERY Right 2019    Procedure: DISTAL ULNA RESECTION, RIGHT MIDDLE SILASTIC METACARPALPHALANGEAL EXCHANGE AND RIGHT ELBOW NODULE EXCISION.;  Surgeon: Hilario Redmond MD;  Location: [facility];  Service: Orthopedics     FOOT SURGERY Left      GYN SURGERY  66,72     HAND SURGERY Right      HAND SURGERY Right      HC ESOPH/GAS REFLUX TEST W NASAL IMPED >1 HR  2012    Procedure:ESOPHAGEAL IMPEDENCE FUNCTION TEST WITH 24 HOUR PH GREATER THAN 1 HOUR; Surgeon:KAYKAY JULIO; Location:[facility]     IR LUMBAR EPIDURAL STEROID INJECTION       IR TRANSLAMINAR EPIDURAL LUMBAR INJ INCL IMAGING  2012    LESI L5-S1 at [facility]     LUMBAR FUSION       OPTICAL TRACKING SYSTEM FUSION POSTERIOR SPINE LUMBAR N/A 2017    Procedure: OPTICAL TRACKING SYSTEM FUSION SPINE POSTERIOR LUMBAR ONE LEVEL;  Surgeon: Anthony Michele MD;  Location:  OR     ORTHOPEDIC SURGERY      left foot surgery     ORTHOPEDIC SURGERY      R MCP surgery     ORTHOPEDIC SURGERY      right knee total replacement     TOTAL HIP ARTHROPLASTY Right      TOTAL KNEE ARTHROPLASTY Left      TOTAL KNEE ARTHROPLASTY Right      ZZC ANESTH,TOTAL HIP ARTHROPLASTY      Rt hip     ZZC HAND/FINGER SURGERY UNLISTED  2005    right hand     ZZC TOTAL KNEE ARTHROPLASTY  2006    left     Family Hx:  Family History   Problem Relation Age of Onset     Arthritis Mother      Alzheimer Disease Mother      Hyperlipidemia Mother      Osteoporosis Mother      Heart Disease Father         MI ( from this)     Alcohol/Drug Father      Arthritis Sister      Hypertension Sister      Other Cancer Sister         Luekemia     Cancer Son      Diabetes Son         type 1      Neurologic Disorder Sister         Schizophrenic     Hypertension Sister      Hyperlipidemia Sister      Mental Illness Sister      Diabetes Son      Other Cancer Son      Substance Abuse Son      Glaucoma Daughter      Diabetes Other         type 2     Hyperlipidemia Other      Social Hx:  Social History     Socioeconomic History     Marital status: Single     Spouse name: Not on file     Number of children: 3     Years of education: Not on file     Highest education level: Not on file   Occupational History     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   Tobacco Use     Smoking status: Former Smoker     Packs/day: 1.00     Years: 41.00     Pack years: 41.00     Types: Cigarettes     Quit date: 1999     Years since quittin.3     Smokeless tobacco: Never Used     Tobacco comment: former smoker   Vaping Use     Vaping Use: Never used   Substance and Sexual Activity     Alcohol use: Yes     Alcohol/week: 0.0 standard drinks     Comment: Occasionally,  usually wine     Drug use: No     Sexual activity: Not Currently     Partners: Male     Comment: To old for all that   Other Topics Concern     Parent/sibling w/ CABG, MI or angioplasty before 65F 55M? No      Service Not Asked     Blood Transfusions Not Asked     Caffeine Concern Yes     Comment: She has 8 cups of coffee in the AM and is done by 8-8:30 AM.     Occupational Exposure Not Asked     Hobby Hazards Not Asked     Sleep Concern Not Asked     Stress Concern Not Asked     Weight Concern Not Asked     Special Diet Not Asked     Back Care Not Asked     Exercise Yes     Comment: Sometimes.  [employer] comes 3 times a week to her building.     Bike Helmet Not Asked     Seat Belt Not Asked     Self-Exams Not Asked   Social History Narrative    She lives in a a senior living apartment building.     Social Determinants of Health     Financial Resource Strain: Not on file   Food Insecurity: Not on file   Transportation Needs: Not on  file   Physical Activity: Not on file   Stress: Not on file   Social Connections: Not on file   Intimate Partner Violence: Not on file   Housing Stability: Not on file          MEDICATIONS:  has a current medication list which includes the following prescription(s): acetaminophen, acetaminophen, atorvastatin, vitamin c, buspirone, cetirizine, cholecalciferol, desvenlafaxine, ferrous sulfate, hydrocodone-acetaminophen, hydrocortisone (perianal), leucovorin, rasuvo, naproxen sodium, pantoprazole, pregabalin, sucralfate, tizanidine, topiramate, rinvoq, and valacyclovir.    ROS     ROS: 10 point ROS neg other than the symptoms noted above in the HPI.    Physical Exam   VS: There were no vitals taken for this visit.  GENERAL: healthy, alert and no distress  EYES: Eyes grossly normal to inspection, conjunctivae and sclerae normal  ENT: no nose swelling, nasal discharge.  Thyroid: no apparent thyroid nodules  RESP: no audible wheeze, cough, or visible cyanosis.  No visible retractions or increased work of breathing.  Able to speak fully in complete sentences.  ABDO: not evaluated.  EXTREMITIES: no hand tremors.  NEURO: Cranial nerves grossly intact, mentation intact and speech normal  SKIN: No apparent skin lesions, rash or edema seen   PSYCH: mentation appears normal, affect normal/bright, judgement and insight intact, normal speech and appearance well-groomed    LABS:  BMP:  Last Basic Metabolic Panel:  Lab Results   Component Value Date     03/27/2020      Lab Results   Component Value Date    POTASSIUM 3.4 03/27/2020     Lab Results   Component Value Date    CHLORIDE 112 03/27/2020     Lab Results   Component Value Date    BRANDEE 9.3 03/21/2022    BRANDEE 9.0 03/18/2021     Lab Results   Component Value Date    CO2 24 03/27/2020     Lab Results   Component Value Date    BUN 18 03/27/2020     Lab Results   Component Value Date    CR 0.69 03/21/2022    CR 0.59 05/10/2021     Lab Results   Component Value Date    GLC 98  03/27/2020       TFTs:  Lab Results   Component Value Date    TSH 1.12 11/27/2020       LFTs:  Liver Function Studies -   Recent Labs   Lab Test 02/04/22  0832   PROTTOTAL 7.7   ALBUMIN 4.1   BILITOTAL 0.5   ALKPHOS 46   AST 13   ALT 25       PTH: 27    Vitamin D:  Vitamin D Deficiency Screening Results:  Lab Results   Component Value Date    VITDT 62 10/13/2021    VITDT 63 08/18/2021    VITDT 48 12/18/2020    VITDT 41 03/06/2019    VITDT 37 10/28/2016       BoneTurnOverMarkers:    DEXA:  DEXA 2/2022: Osteoporosis. There has been no significant change in bone density of the hip(s). The left forearm was not included on the previous study so comparison is not possible.    The spine is not acceptable for evaluation due to previous surgical changes.    The right femur is not acceptable for evaluation due to previous arthroplasty.     All pertinent notes, labs, and images personally reviewed by me.     A/P  Ms.Ruth SIM Marshall is a 81 year old here for the evaluation of:    #1. Osteoporosis:  RISK FACTORS:  Post-menopausal, Height loss of 3.5 inches, Parent history of osteoporosis with a hip fracture, Fractures of wrist and pelvis,  Condition related to bone loss: rheumatoid arthritis, long term steroid treatment, follow up osteoporosis  DEXA 2/2022: Osteoporosis. There has been no significant change in bone density of the hip(s). The left forearm was not included on the previous study so comparison is not possible.    Previously treated wiht Reclast.  2013, 2014, 2016.   Last Reclast was 3/2022. (It was restarted at that time by PCP)  ( No Reclast [id number])  H/o GERD: on PPI. Under good control.  DENTAL: ok. No procedure planned. Has partial dentures.  KIDNEY function: within normal limits  Plan:  Discussed diagnosis, pathophysiology, management and treatment options of condition with pt.  She recently  received RECLAST 3/2022. Will continue Reclast as she is due for next in 1 year.  Next DEXA in 2/2023 (if covered by  insurance) or in 2/2024.  Next Reclast in 3/2023 if next DEXA is in 2/2024.  Follow up with endocrinology after DEXA.    The pt was advised to    Maintain an adequate calcium and vitamin D intake and to supplement vitamin D if needed to maintain serum levels of 25 hydroxy D (25 OH D) between 30-60 ng/ml.    Limit alcohol intake to no more than 2 servings per day.    Limit caffeine intake.    Maintain an active lifestyle including weight-bearing exercises for at least 30 mins daily.    Take measures to reduce the risk of falling.    More than 50% of the time spent with Ms. Marshall on counseling / coordinating her care.      All questions were answered.  The patient indicates understanding of the above issues and agrees with the plan set forth.    Follow-up:  1 year.    Annmarie Smith MD  Endocrinology  [facility]  CC: Gabriela Jefferson        Again, thank you for allowing me to participate in the care of your patient.        Sincerely,        Annmarie Smith MD

## 2022-04-18 NOTE — PROGRESS NOTES
Ginger is a 81 year old who is being evaluated via a billable video visit.      How would you like to obtain your AVS? MyChart  If the video visit is dropped, the invitation should be resent by: Text to cell phone: [phone]   Will anyone else be joining your video visit? No

## 2022-04-18 NOTE — PROGRESS NOTES
"THIS IS A VIDEO VISIT:    Phone call visit/virtual visit encounter:    Name of patient: Ginger Marshall    Date of encounter: 4/18/2022    Time of start of video visit: 11:30    Video started: 11:42    Video ended: 12:04    Provider location: working from home/ [facility]    Patient location: patients home.    Mode of transmission: video/ Doximity    Verbal consent: obtained before starting visit. Pt is agreeable.      The patient has been notified of following:      \"This VIDEO visit will be conducted via a call between you and your physician/provider. We have found that certain health care needs can be provided without the need for a physical exam.  This service lets us provide the care you need with a short phone conversation.  If a prescription is necessary we can send it directly to your pharmacy.  If lab work is needed we can place an order for that and you can then stop by our lab to have the test done at a later time.     With new updates with corona virus patient might be billed as clinic visit.     If during the course of the call the physician/provider feels a telephone visit is not appropriate, you will not be charged for this service.\"      Past medical history, social history, family history, allergy and medications were reviewed and updated as appropriate.  Reviewed pertinent labs, notes, imaging studies personally.    Name: Ginger Marshall  Date: 4/18/2022  Seen in consultation with Gabriela Jefferson for osteoporosis.     HPI:  Ginger Marshall is a 81 year old female who presents for the evaluation of osteoporosis.   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     Was seen by Arlet Hernandez previously.      DEXA 2/2022: Osteoporosis. There has been no significant change in bone density of the hip(s). The left forearm was not included on the previous study so comparison is not possible.    The spine is not acceptable for evaluation due to previous surgical changes.    The right femur is not acceptable for evaluation due to previous arthroplasty.     RISK FACTORS:  Post-menopausal, Height loss of 3.5 inches, Parent history of osteoporosis with a hip fracture, Fractures of wrist and pelvis,  Condition related to bone loss: rheumatoid arthritis, long term steroid treatment, follow up osteoporosis    CURRENT TREATMENT:  Calcium, Vitamin D, previous Reclast.  2013, 2014, 2016.   ( No Reclast [id number])  Last Reclast was 3/2022. ( it was restarted at that time by PCP)    GERD: on PPI. Under good control.    DENTAL: ok. No procedure planned. Has partial dentures.    KIDNEY function: within normal limits    Steroid: h/o  rheumatoid arthritis, was long term steroid treatment, was on prednisone for many year. She has been on steroid since 1980s Last use was around 2019. Now she is on Methotrexate.    Initially diagnosed with osteopenia in 2010, 2013,2015.  DEXA 2018 c/w osteoporosis.      Smoke:former smoker, quit in 1999.  Family History:Yes: mother and sister with osteoporosis.  Menstrual history/Birthing: s/p menopause. NO HRT. Wrist fractures after fall.  Fractures:h/o pelvic fracture. . She had sacral fracture in 2013. Treated non surgically. H/o non traumatic displaced inferior pubic ramus fracture in 2/2015. H/o right wrist  fracture.  Kidney stones: No  GI Surgery:No  Duration of therapy: Reclast as noted above  Exercise: Not much. Walks her dog. Has back pain.  Diet: dairy 1-2 servings/day.  Ca/Vitamin D: calcium- 600 mg once a day , Vit D 1000 international unit(s) BID  Alcohol: social  Eating Disorder: No    PMH/PSH:  Past Medical History:   Diagnosis Date     Acute posthemorrhagic anemia 10/13/2012     Closed fracture of multiple ribs of left side, initial encounter 2019     COPD (chronic obstructive pulmonary disease) (H)     no longer occurring     Ex-smoker 1999     Gastroenteritis 2020    Gastroenteritis with norovirus     Herniated nucleus pulposus, L3-4 3/1/2017     Hip joint replacement by other means 07/10/2008     History of blood transfusion      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     History of total knee replacement 2009     Menopause 1989    late 40's     Migraine 2014    resolved     Multinodular goiter      Other chronic pain     joints     Pelvic fracture (H) 2014     Rheumatic mitral stenosis      Rheumatoid arteritis (H)      S/P lumbar fusion 2017     Sleep apnea      Vitamin B12 deficiency      Past Surgical History:   Procedure Laterality Date     ABDOMEN SURGERY      c sections     ARTHROSCOPY KNEE Left      ARTHROSCOPY KNEE RT/LT  2006    left     BACK SURGERY      disc     BIOPSY BREAST       BREAST SURGERY      lumpectomy 's     BREAST SURGERY      lumpectomy     CATARACT EXTRACTION Bilateral      CATARACT IOL, RT/LT Bilateral 2010 aproximately      SECTION  1966      SECTION  1972     COLONOSCOPY  2009,     COLONOSCOPY       FINGER SURGERY Right 2019    Procedure: DISTAL ULNA RESECTION, RIGHT MIDDLE SILASTIC METACARPALPHALANGEAL EXCHANGE AND RIGHT ELBOW NODULE EXCISION.;  Surgeon: Hilario Redmond MD;  Location: [facility] OR;  Service: Orthopedics     FOOT SURGERY Left      GYN SURGERY  66,74      HAND SURGERY Right      HAND SURGERY Right      HC ESOPH/GAS REFLUX TEST W NASAL IMPED >1 HR  2012    Procedure:ESOPHAGEAL IMPEDENCE FUNCTION TEST WITH 24 HOUR PH GREATER THAN 1 HOUR; Surgeon:KAYKAY JULIO; Location:[facility]     IR LUMBAR EPIDURAL STEROID INJECTION       IR TRANSLAMINAR EPIDURAL LUMBAR INJ INCL IMAGING  2012    LESI L5-S1 at [facility]     LUMBAR FUSION       OPTICAL TRACKING SYSTEM FUSION POSTERIOR SPINE LUMBAR N/A 2017    Procedure: OPTICAL TRACKING SYSTEM FUSION SPINE POSTERIOR LUMBAR ONE LEVEL;  Surgeon: Anthony Michele MD;  Location: [facility] OR     ORTHOPEDIC SURGERY      left foot surgery     ORTHOPEDIC SURGERY      R MCP surgery     ORTHOPEDIC SURGERY      right knee total replacement     TOTAL HIP ARTHROPLASTY Right      TOTAL KNEE ARTHROPLASTY Left      TOTAL KNEE ARTHROPLASTY Right      ZZC ANESTH,TOTAL HIP ARTHROPLASTY      Rt hip     ZZC HAND/FINGER SURGERY UNLISTED  2005    right hand     ZZC TOTAL KNEE ARTHROPLASTY  2006    left     Family Hx:  Family History   Problem Relation Age of Onset     Arthritis Mother      Alzheimer Disease Mother      Hyperlipidemia Mother      Osteoporosis Mother      Heart Disease Father         MI ( from this)     Alcohol/Drug Father      Arthritis Sister      Hypertension Sister      Other Cancer Sister         Luekemia     Cancer Son      Diabetes Son         type 1     Neurologic Disorder Sister         Schizophrenic     Hypertension Sister      Hyperlipidemia Sister      Mental Illness Sister      Diabetes Son      Other Cancer Son      Substance Abuse Son      Glaucoma Daughter      Diabetes Other         type 2     Hyperlipidemia Other      Social Hx:  Social History     Socioeconomic History     Marital status: Single     Spouse name: Not on file     Number of children: 3     Years of education: Not on file     Highest education level: Not on file   Occupational History     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   Tobacco Use     Smoking status: Former Smoker     Packs/day: 1.00     Years: 41.00     Pack years: 41.00     Types: Cigarettes     Quit date: 1999     Years since quittin.3     Smokeless tobacco: Never Used     Tobacco comment: former smoker   Vaping Use     Vaping Use: Never used   Substance and Sexual Activity     Alcohol use: Yes     Alcohol/week: 0.0 standard drinks     Comment: Occasionally,  usually wine     Drug use: No     Sexual activity: Not Currently     Partners: Male     Comment: To old for all that   Other Topics Concern     Parent/sibling w/ CABG, MI or angioplasty before 65F 55M? No      Service Not Asked     Blood Transfusions Not Asked     Caffeine Concern Yes     Comment: She has 8 cups of coffee in the AM and is done by 8-8:30 AM.     Occupational Exposure Not Asked     Hobby Hazards Not Asked     Sleep Concern Not Asked     Stress Concern Not Asked     Weight Concern Not Asked     Special Diet Not Asked     Back Care Not Asked     Exercise Yes     Comment: Sometimes.  [employer] comes 3 times a week to her building.     Bike Helmet Not Asked     Seat Belt Not Asked     Self-Exams Not Asked   Social History Narrative    She lives in a a senior living apartment building.     Social Determinants of Health     Financial Resource Strain: Not on file   Food Insecurity: Not on file   Transportation Needs: Not on file   Physical Activity: Not on file   Stress: Not on file   Social Connections: Not on file   Intimate Partner Violence: Not on file   Housing Stability: Not on file          MEDICATIONS:  has a current medication list which includes the following prescription(s): acetaminophen, acetaminophen, atorvastatin, vitamin c, buspirone, cetirizine, cholecalciferol, desvenlafaxine, ferrous sulfate, hydrocodone-acetaminophen, hydrocortisone (perianal), leucovorin, rasuvo, naproxen sodium, pantoprazole, pregabalin, sucralfate, tizanidine,  topiramate, rinvoq, and valacyclovir.    ROS     ROS: 10 point ROS neg other than the symptoms noted above in the HPI.    Physical Exam   VS: There were no vitals taken for this visit.  GENERAL: healthy, alert and no distress  EYES: Eyes grossly normal to inspection, conjunctivae and sclerae normal  ENT: no nose swelling, nasal discharge.  Thyroid: no apparent thyroid nodules  RESP: no audible wheeze, cough, or visible cyanosis.  No visible retractions or increased work of breathing.  Able to speak fully in complete sentences.  ABDO: not evaluated.  EXTREMITIES: no hand tremors.  NEURO: Cranial nerves grossly intact, mentation intact and speech normal  SKIN: No apparent skin lesions, rash or edema seen   PSYCH: mentation appears normal, affect normal/bright, judgement and insight intact, normal speech and appearance well-groomed    LABS:  BMP:  Last Basic Metabolic Panel:  Lab Results   Component Value Date     03/27/2020      Lab Results   Component Value Date    POTASSIUM 3.4 03/27/2020     Lab Results   Component Value Date    CHLORIDE 112 03/27/2020     Lab Results   Component Value Date    BRANDEE 9.3 03/21/2022    BRANDEE 9.0 03/18/2021     Lab Results   Component Value Date    CO2 24 03/27/2020     Lab Results   Component Value Date    BUN 18 03/27/2020     Lab Results   Component Value Date    CR 0.69 03/21/2022    CR 0.59 05/10/2021     Lab Results   Component Value Date    GLC 98 03/27/2020       TFTs:  Lab Results   Component Value Date    TSH 1.12 11/27/2020       LFTs:  Liver Function Studies -   Recent Labs   Lab Test 02/04/22  0832   PROTTOTAL 7.7   ALBUMIN 4.1   BILITOTAL 0.5   ALKPHOS 46   AST 13   ALT 25       PTH: 27    Vitamin D:  Vitamin D Deficiency Screening Results:  Lab Results   Component Value Date    VITDT 62 10/13/2021    VITDT 63 08/18/2021    VITDT 48 12/18/2020    VITDT 41 03/06/2019    VITDT 37 10/28/2016       BoneTurnOverMarkers:    DEXA:  DEXA 2/2022: Osteoporosis. There has been no  significant change in bone density of the hip(s). The left forearm was not included on the previous study so comparison is not possible.    The spine is not acceptable for evaluation due to previous surgical changes.    The right femur is not acceptable for evaluation due to previous arthroplasty.     All pertinent notes, labs, and images personally reviewed by me.     A/P  Ms.Ruth SIM Marshall is a 81 year old here for the evaluation of:    #1. Osteoporosis:  RISK FACTORS:  Post-menopausal, Height loss of 3.5 inches, Parent history of osteoporosis with a hip fracture, Fractures of wrist and pelvis,  Condition related to bone loss: rheumatoid arthritis, long term steroid treatment, follow up osteoporosis  DEXA 2/2022: Osteoporosis. There has been no significant change in bone density of the hip(s). The left forearm was not included on the previous study so comparison is not possible.    Previously treated wiht Reclast.  2013, 2014, 2016.   Last Reclast was 3/2022. (It was restarted at that time by PCP)  ( No Reclast [id number])  H/o GERD: on PPI. Under good control.  DENTAL: ok. No procedure planned. Has partial dentures.  KIDNEY function: within normal limits  Plan:  Discussed diagnosis, pathophysiology, management and treatment options of condition with pt.  She recently  received RECLAST 3/2022. Will continue Reclast as she is due for next in 1 year.  Next DEXA in 2/2023 (if covered by insurance) or in 2/2024.  Next Reclast in 3/2023 if next DEXA is in 2/2024.  Follow up with endocrinology after DEXA.    The pt was advised to    Maintain an adequate calcium and vitamin D intake and to supplement vitamin D if needed to maintain serum levels of 25 hydroxy D (25 OH D) between 30-60 ng/ml.    Limit alcohol intake to no more than 2 servings per day.    Limit caffeine intake.    Maintain an active lifestyle including weight-bearing exercises for at least 30 mins daily.    Take measures to reduce the risk of falling.    More  than 50% of the time spent with Ms. Sweats on counseling / coordinating her care.      All questions were answered.  The patient indicates understanding of the above issues and agrees with the plan set forth.    Follow-up:  1 year.    Annmarie Smith MD  Endocrinology  [facility]  CC: Gabriela Jefferson

## 2022-04-29 ENCOUNTER — LAB (OUTPATIENT)
Dept: LAB | Facility: CLINIC | Age: 82
End: 2022-04-29
Payer: COMMERCIAL

## 2022-04-29 DIAGNOSIS — Z79.899 HIGH RISK MEDICATION USE: ICD-10-CM

## 2022-04-29 DIAGNOSIS — M05.79 RHEUMATOID ARTHRITIS INVOLVING MULTIPLE SITES WITH POSITIVE RHEUMATOID FACTOR (H): ICD-10-CM

## 2022-04-29 LAB
ALBUMIN SERPL-MCNC: 4 G/DL (ref 3.4–5)
ALP SERPL-CCNC: 42 U/L (ref 40–150)
ALT SERPL W P-5'-P-CCNC: 32 U/L (ref 0–50)
AST SERPL W P-5'-P-CCNC: 23 U/L (ref 0–45)
BASOPHILS # BLD AUTO: 0 10E3/UL (ref 0–0.2)
BASOPHILS NFR BLD AUTO: 0 %
BILIRUB DIRECT SERPL-MCNC: 0.1 MG/DL (ref 0–0.2)
BILIRUB SERPL-MCNC: 0.4 MG/DL (ref 0.2–1.3)
CREAT SERPL-MCNC: 0.57 MG/DL (ref 0.52–1.04)
CRP SERPL-MCNC: <2.9 MG/L (ref 0–8)
EOSINOPHIL # BLD AUTO: 0.2 10E3/UL (ref 0–0.7)
EOSINOPHIL NFR BLD AUTO: 4 %
ERYTHROCYTE [DISTWIDTH] IN BLOOD BY AUTOMATED COUNT: 13.8 % (ref 10–15)
ERYTHROCYTE [SEDIMENTATION RATE] IN BLOOD BY WESTERGREN METHOD: 15 MM/HR (ref 0–30)
GFR SERPL CREATININE-BSD FRML MDRD: >90 ML/MIN/1.73M2
HCT VFR BLD AUTO: 38.1 % (ref 35–47)
HGB BLD-MCNC: 12 G/DL (ref 11.7–15.7)
LYMPHOCYTES # BLD AUTO: 0.9 10E3/UL (ref 0.8–5.3)
LYMPHOCYTES NFR BLD AUTO: 16 %
MCH RBC QN AUTO: 31.8 PG (ref 26.5–33)
MCHC RBC AUTO-ENTMCNC: 31.5 G/DL (ref 31.5–36.5)
MCV RBC AUTO: 101 FL (ref 78–100)
MONOCYTES # BLD AUTO: 1 10E3/UL (ref 0–1.3)
MONOCYTES NFR BLD AUTO: 18 %
NEUTROPHILS # BLD AUTO: 3.4 10E3/UL (ref 1.6–8.3)
NEUTROPHILS NFR BLD AUTO: 61 %
PLATELET # BLD AUTO: 332 10E3/UL (ref 150–450)
PROT SERPL-MCNC: 7.7 G/DL (ref 6.8–8.8)
RBC # BLD AUTO: 3.77 10E6/UL (ref 3.8–5.2)
WBC # BLD AUTO: 5.5 10E3/UL (ref 4–11)

## 2022-04-29 PROCEDURE — 82565 ASSAY OF CREATININE: CPT

## 2022-04-29 PROCEDURE — 85652 RBC SED RATE AUTOMATED: CPT

## 2022-04-29 PROCEDURE — 85025 COMPLETE CBC W/AUTO DIFF WBC: CPT

## 2022-04-29 PROCEDURE — 80076 HEPATIC FUNCTION PANEL: CPT

## 2022-04-29 PROCEDURE — 36415 COLL VENOUS BLD VENIPUNCTURE: CPT

## 2022-04-29 PROCEDURE — 86140 C-REACTIVE PROTEIN: CPT

## 2022-05-02 NOTE — PROGRESS NOTES
Ginger Marshall  is a 81 year old year old who is being evaluated via a billable video visit.      How would you like to obtain your AVS? MyChart  If the video visit is dropped, the invitation should be resent by: Text to cell phone: [phone]   Will anyone else be joining your video visit? No     Rheumatology Video Visit      Ginger Marshall MRN# [id number]   YOB: 1940 Age: 81 year old      Date of visit: 5/03/22   PCP: Dr. Gabriela Jefferson     Chief Complaint   Patient presents with:  Rheumatoid Arthritis    Assessment and Plan     1. Seropositive Erosive Rheumatoid Arthritis ( [2009], CCP >100 [2009]; hx of rheumatoid nodule by 6/14/2011 pathology): Previously failed remicade (staph infection during therapy, but was immediately after a joint injection), orencia (migraines), HCQ (ineffective), Xeljanz (partially effective).  Sulfa allergy.  Currently on methotrexate 25 mg SQ once weekly (dose reduction in the past resulted in more symptoms), leucovorin 5 mg weekly (resolved nasal sore that didn't improve with higher daily folic acid doses), and Rinvoq 15mg daily.  RA well controlled since changing to Rinvoq.  Discussed the cardiovascular risk and malignancy potential associated with Rinvoq and she would like to continue as she is doing well with regard to RA.   Chronic illness, stable.    - Continue methotrexate 25mg SQ once weekly  - Continue leucovorin 5 mg every 7 days, 24 hours after MTX dose.   - Continue Rinvoq 15 mg daily  - Labs in 3 months: CBC, Creatinine, Hepatic Panel, ESR, CRP              Rapid 3, cumulative scores                      10/13/2021:  15.7    High risk medication requiring intensive toxicity monitoring at least quarterly: labs ordered include CBC, Creatinine, Hepatic panel to monitor for cytopenia and hepatotoxicity; checking creatinine as it affects clearance of medication.      2. 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  Doing okay at this time.     3. Right hip pain: Status post WALTER twice in the past. Followed by [facility].     4. 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 Michele who is now at [facility].  Has been seen at [facility], [facility] and [facility].  She reports that a TENS device is helpful.  She does not want additional back injections and she does not want surgery.  Therefore, I advised that she follow-up with her PCP for the chronic low back pain, or discuss further with her pain clinic.     5. Osteoporosis: was previously managed by endocrinology and she received reclast once in 2013, 2014, 2016. 12/12/2018 DEXA ordered by Dr. Vázquez showed osteoporosis.  Repeat DEXA on 2/2/2022 showed osteoporosis; no significant change of the hips; forearm osteoporosis but no previous evaluation of the forearm for comparison.  Reclast was restarted after sufficient drug holiday, with the first dose on 3/18/2021; again received in 3/21/2022; plan to continue yearly for now.  Chronic illness, stable.    - Reclast once yearly; next due on 3/21/2023  - Continue vitamin D 1000 IU daily  - Continue calcium 1200mg daily              6. Left 1st toe pain, history: 2 episodes of sudden onset left toe pain followed by diffuse left foot pain that made ambulation difficult.  Also with nodules over both Achilles tendons and the right elbow that are either rheumatoid nodules or tophi.  She reports having history of gout decades ago.  Denies ever being on colchicine or allopurinol.  Discussed colchicine to use if suspected gout flare occurs.  No flares since last seen so has not used colchicine.  - Colchicine: (MITIGARE) 0.6 MG capsule; Take 2 capsules (1.2 mg) by mouth once for 1 dose at the onset of a gout flare, followed by 1 capsule (0.6mg) 1 hour later.      7. Chronic pain syndrome: Documented here for historical significance only.  Doing  well at this time.  Management per pain clinic and/or PCP    8.  Vaccinations: Vaccinations reviewed with Ms. Marshall.  Risks and benefits of vaccinations were discussed.    - Influenza: up to date  - [id number]: up to date  - [id number]: up to date  - Shingrix: Up to date   - COVID-19: has received the Pfizer COVID-19 vaccine on 2/9/2021 and 3/3/2021. A third dose of an mRNA COVID-19 vaccination is recommended to receive 28 days after the second mRNA COVID-19 vaccination was received; this 3rd dose should be from the same  as the first two doses, and will complete the initial vaccine series.   A 4th dose (1st booster) of the mRNA COVID-19 vaccination is recommended to be received 3 months after the third mRNA COVID-19 vaccination was received.  A 5th mRNA vaccine (2nd booster) may be received at least 4 months after the 4th dose.   Based on our current understanding (and this may change over time as we learn more), medications should be adjusted as noted below, if disease activity allows:  - NSAIDs and Acetaminophen: hold for 24 hours prior to vaccination if able to do so  - Methotrexate should be held for 1-2 weeks after each COVID-19 vaccine (as disease activity allows)  - Rinvoq (upadacitinib) should be held for 1-2 weeks after each COVID-19 vaccine (as disease activity allows)     Total minutes spent in evaluation with patient, documentation, , and review of pertinent studies and chart notes: 41     Ms. Marshall verbalized agreement with and understanding of the rational for the diagnosis and treatment plan.  All questions were answered to best of my ability and the patient's satisfaction. Ms. Marshall was advised to contact the clinic with any questions that may arise after the clinic visit.      Thank you for involving me in the care of the patient    Return to clinic: 3-4 months    HPI   Ginger Marshall is a 81 year old female with a history of multiple foot surgeries, hand tendon transfers,  MCP replacements (most recent being in August 2015), bilateral TKA, right WALTER, left distal radius fracture history, gout, s/p lumbar fusion, osteoporosis and seropositive (RF+, CCP+) erosive rheumatoid arthritis who presents for follow-up of rheumatoid arthritis.      Today, 5/3/2022: RA controlled.  Tolerating methotrexate and Rinvoq well.  Chronic low back pain; does not want steroid injections or another surgery; so she will follow-up with her pain clinic and/or PCP for this issue.  Peripheral joints are doing well.  Morning stiffness for no more than 20 minutes.  No gelling phenomenon.  No peripheral joint pain.  Overall happy with how well her RA is managed at this time and she would like to remain on her current medication regimen.  Had Reclast that was tolerated well.    Denies fevers, chills, nausea, vomiting, constipation, diarrhea. No abdominal pain. No chest pain/pressure, palpitations, or shortness of breath. No oral or nasal sores.   No rash. No LE swelling.     Tobacco: quit in 1999  EtOH: 1 glass of wine every month at most  Drugs: None  Occupation: , retired     ROS   12 point review of system was completed and negative except as noted in the HPI     Active Problem List     Patient Active Problem List   Diagnosis     Rheumatoid arthritis involving right hand with positive rheumatoid factor (H)     History of colonic polyps     Multinodular goiter     CARDIOVASCULAR SCREENING; LDL GOAL LESS THAN 130     Pulmonary nodule     PSEUDOPHAKIA OU     PVD (POSTERIOR VITREOUS DETACHMENT) OU     PXF (PSEUDOEXFOLIATION OF LENS CAPSULE) OD     GERD (gastroesophageal reflux disease)     Hyperlipidemia LDL goal <100     Advance Care Planning     Neuropathy     SHERRY (obstructive sleep apnea)-severe (AHI 35)     zEncounter for counseling     Anemia of chronic disease     Osteoporosis     Cornea guttata, ou     Conjunctival concretions     Stenosis, spinal, lumbar     Chronic pain     Class 1  obesity due to excess calories with serious comorbidity and body mass index (BMI) of 31.0 to 31.9 in adult     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     MGD (meibomian gland dysfunction)     Blepharitis of both eyes     Personal history of healed osteoporosis fracture     Iron malabsorption     Hyperglycemia     Chronic bilateral low back pain without sciatica     Allergic state, subsequent encounter     Anxiety     Low iron     History of depression     DDD (degenerative disc disease), lumbar     Chronic right shoulder pain     Moderate episode of recurrent major depressive disorder (H)     Rheumatic mitral stenosis     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     History of bisphosphonate therapy     Past Medical History     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     Menopause 1989    late 40's     Migraine 04/27/2014    resolved     Multinodular goiter      Other chronic pain     joints     Pelvic fracture (H) 05/13/2014     Rheumatic mitral stenosis      Rheumatoid arteritis (H)      S/P lumbar fusion 04/03/2017     Sleep apnea      Vitamin B12 deficiency      Past Surgical History     Past Surgical History:   Procedure Laterality Date     ABDOMEN SURGERY      c sections     ARTHROSCOPY KNEE Left      ARTHROSCOPY KNEE RT/LT  01/2006    left     BACK SURGERY  2013    disc     BIOPSY BREAST       BREAST SURGERY  1995    lumpectomy 90's     BREAST SURGERY       "lumpectomy     CATARACT EXTRACTION Bilateral      CATARACT IOL, RT/LT Bilateral 2010 aproximately      SECTION  1966      SECTION  1972     COLONOSCOPY  2009,     COLONOSCOPY       FINGER SURGERY Right 2019    Procedure: DISTAL ULNA RESECTION, RIGHT MIDDLE SILASTIC METACARPALPHALANGEAL EXCHANGE AND RIGHT ELBOW NODULE EXCISION.;  Surgeon: Hilario Redmond MD;  Location: [facility];  Service: Orthopedics     FOOT SURGERY Left      GYN SURGERY  66,74     HAND SURGERY Right      HAND SURGERY Right      HC ESOPH/GAS REFLUX TEST W NASAL IMPED >1 HR  2012    Procedure:ESOPHAGEAL IMPEDENCE FUNCTION TEST WITH 24 HOUR PH GREATER THAN 1 HOUR; Surgeon:KAYKAY JULIO; Location: GI     IR LUMBAR EPIDURAL STEROID INJECTION       IR TRANSLAMINAR EPIDURAL LUMBAR INJ INCL IMAGING  2012    LESI L5-S1 at [facility]     LUMBAR FUSION       OPTICAL TRACKING SYSTEM FUSION POSTERIOR SPINE LUMBAR N/A 2017    Procedure: OPTICAL TRACKING SYSTEM FUSION SPINE POSTERIOR LUMBAR ONE LEVEL;  Surgeon: Anthony Michele MD;  Location:  OR     ORTHOPEDIC SURGERY      left foot surgery     ORTHOPEDIC SURGERY      R MCP surgery     ORTHOPEDIC SURGERY      right knee total replacement     TOTAL HIP ARTHROPLASTY Right      TOTAL KNEE ARTHROPLASTY Left      TOTAL KNEE ARTHROPLASTY Right      ZZC ANESTH,TOTAL HIP ARTHROPLASTY      Rt hip     ZZC HAND/FINGER SURGERY UNLISTED  2005    right hand     ZZC TOTAL KNEE ARTHROPLASTY      left     Allergy     Allergies   Allergen Reactions     Abatacept Other (See Comments)     Severe headaches  Migraine     Celebrex [Celecoxib]      Ineffective     Celecoxib Unknown and Nausea     Ethanol      Antihistamines     Orencia [Abatacept]      Headache     Septra [Bactrim]      Sulfa Drugs Nausea and Vomiting     \"deathly ill\"  Allergic to everything with Sulfa in it.     Sulfamethoxazole-Trimethoprim Nausea and Nausea " "and Vomiting     Tramadol Other (See Comments)     Headache  Migraine headache     Valdecoxib Unknown and Nausea     Made me \"very very ill\", might of been \"cramping\"     Adhesive Tape Rash     Plastic tape  Plastic tapes     Antihistamines, Chlorpheniramine-Type  [Alkylamines] Anxiety and Other (See Comments)     Current Medication List     Current Outpatient Medications   Medication Sig     acetaminophen (TYLENOL) 500 MG tablet Take 1-2 tablets (500-1,000 mg) by mouth 2 times daily     acetaminophen (TYLENOL) 500 MG tablet Take 2 tablets (1,000 mg) by mouth every 8 hours as needed for pain     atorvastatin (LIPITOR) 40 MG tablet Take 1 tablet (40 mg) by mouth daily     Bioflavonoid Products (VITAMIN C) CHEW      busPIRone (BUSPAR) 10 MG tablet Take 1 tablet by mouth daily     cetirizine (ZYRTEC) 10 MG tablet Take 1 tablet by mouth daily as needed     Cholecalciferol (VITAMIN D-3 PO) Take 1,000 Units by mouth 2 times daily     desvenlafaxine (PRISTIQ) 100 MG 24 hr tablet Take 1 tablet (100 mg) by mouth daily     HYDROcodone-acetaminophen (NORCO) 5-325 MG tablet Take 1-2 tablets by mouth daily as needed     hydrocortisone, Perianal, (HYDROCORTISONE) 2.5 % cream Place rectally 2 times daily as needed for hemorrhoids     leucovorin (WELLCOVORIN) 5 MG tablet Take 1 tablet (5 mg) by mouth every 7 days . Take 24 hours after taking Methotrexate each week.     Methotrexate, PF, (RASUVO) 25 MG/0.5ML autoinjector Inject 0.5 mLs (25 mg) Subcutaneous every 7 days . Hold for signs of infection, and seek medical attention.     naproxen sodium (ANAPROX) 220 MG tablet Take 2 tablets by mouth 2 times daily as needed     pantoprazole (PROTONIX) 40 MG EC tablet TAKE ONE TABLET BY MOUTH ONCE DAILY 30-60 MINUTES BEFORE A MEAL     pregabalin (LYRICA) 50 MG capsule Take 1 capsule by mouth 2 times daily     sucralfate (CARAFATE) 1 GM tablet TAKE ONE TABLET BY MOUTH FOUR TIMES A DAY AS NEEDED HEATBURN     tiZANidine (ZANAFLEX) 2 MG tablet " "Take 1 tablet by mouth 3 times daily as needed     topiramate (TOPAMAX) 50 MG tablet Take 1.5 tablets (75 mg) by mouth 2 times daily     Upadacitinib ER (RINVOQ) 15 MG TB24 Take 15 mg by mouth daily     valACYclovir (VALTREX) 500 MG tablet Take 1 tablet (500 mg) by mouth 2 times daily (Patient taking differently: Take 500 mg by mouth 2 times daily as needed)     Ferrous Sulfate 324 (65 Fe) MG TBEC Take 324 mg by mouth daily (Patient not taking: Reported on 3/23/2022)     No current facility-administered medications for this visit.     Social History   See HPI    Family History     Family History   Problem Relation Age of Onset     Arthritis Mother      Alzheimer Disease Mother      Hyperlipidemia Mother      Osteoporosis Mother      Heart Disease Father         MI ( from this)     Alcohol/Drug Father      Arthritis Sister      Hypertension Sister      Other Cancer Sister         Luekemia     Cancer Son      Diabetes Son         type 1     Neurologic Disorder Sister         Schizophrenic     Hypertension Sister      Hyperlipidemia Sister      Mental Illness Sister      Diabetes Son      Other Cancer Son      Substance Abuse Son      Glaucoma Daughter      Diabetes Other         type 2     Hyperlipidemia Other      No change in family history since the previous clinic visit.    Physical Exam     Temp Readings from Last 3 Encounters:   22 98.2  F (36.8  C) (Oral)   22 97.8  F (36.6  C) (Tympanic)   22 98.2  F (36.8  C) (Oral)     BP Readings from Last 5 Encounters:   22 118/60   22 114/76   22 124/72   22 132/78   10/13/21 111/72     Pulse Readings from Last 1 Encounters:   22 78     Resp Readings from Last 1 Encounters:   22 17     Estimated body mass index is 34.3 kg/m  as calculated from the following:    Height as of 3/21/22: 1.67 m (5' 5.75\").    Weight as of 3/23/22: 95.7 kg (210 lb 14.4 oz).      GEN: NAD. Healthy appearing adult.   PULM: No increased " work of breathing  MSK:  Hands and wrists without swelling.    SKIN: No rash or jaundice seen  PSYCH: Alert. Appropriate.      Labs     CBC  Recent Labs   Lab Test 04/29/22  1000 02/04/22  0832 12/08/21  1053 08/18/21  1054 05/10/21  1023 02/16/21  1019 11/16/20  1028   WBC 5.5 7.5 5.1   < > 5.0 5.7 5.7   RBC 3.77* 3.83 3.75*   < > 3.39* 3.81 3.91   HGB 12.0 12.5 12.2   < > 11.3* 12.5 12.0   HCT 38.1 39.1 38.8   < > 35.6 38.9 37.8   * 102* 104*   < > 105* 102* 97   RDW 13.8 14.7 15.1*   < > 14.3 15.2* 16.1*    386 384   < > 333 335 361   MCH 31.8 32.6 32.5   < > 33.3* 32.8 30.7   MCHC 31.5 32.0 31.4*   < > 31.7 32.1 31.7   NEUTROPHIL 61 67 59   < > 57.7 65.8 60.5   LYMPH 16 14 23   < > 18.5 17.0 20.4   MONOCYTE 18 16 14   < > 17.3 12.1 12.8   EOSINOPHIL 4 4 5   < > 6.3 4.9 6.1   BASOPHIL 0 0 0   < > 0.2 0.2 0.2   ANEU  --   --   --   --  2.9 3.8 3.5   ALYM  --   --   --   --  0.9 1.0 1.2   MARYURI  --   --   --   --  0.9 0.7 0.7   AEOS  --   --   --   --  0.3 0.3 0.4   ABAS  --   --   --   --  0.0 0.0 0.0   ANEUTAUTO 3.4 5.0 3.0   < >  --   --   --    ALYMPAUTO 0.9 1.0 1.2   < >  --   --   --    AMONOAUTO 1.0 1.2 0.7   < >  --   --   --    AEOSAUTO 0.2 0.3 0.2   < >  --   --   --    ABSBASO 0.0 0.0 0.0   < >  --   --   --     < > = values in this interval not displayed.     CMP  Recent Labs   Lab Test 04/29/22  1000 03/21/22  1041 02/04/22  0832 12/08/21  1053 10/13/21  1447 08/18/21  1054 08/18/21  1054 05/10/21  1023 03/18/21  1350 02/16/21  1019 12/18/20  0923 11/16/20  1028 05/22/20  1026 03/27/20  1413 02/27/20  1117 12/19/19  0957 09/12/19  1019 08/15/19  1118   NA  --   --   --   --   --   --   --   --   --   --   --   --   --  140  --  142  --  144   POTASSIUM  --   --   --   --   --   --   --   --   --   --   --   --   --  3.4  --  3.8  --  3.5   CHLORIDE  --   --   --   --   --   --   --   --   --   --   --   --   --  112*  --  111*  --  112*   CO2  --   --   --   --   --   --   --   --   --    --   --   --   --  24  --  24  --  25   ANIONGAP  --   --   --   --   --   --   --   --   --   --   --   --   --  4  --  7  --  7   GLC  --   --   --   --   --   --   --   --   --   --   --   --   --  98  --  88  --  95   BUN  --   --   --   --   --   --   --   --   --   --   --   --   --  18  --  10  --  16   CR 0.57 0.69 0.59 0.70 0.71   < > 0.72 0.59  --  0.62  --  0.60   < > 0.57   < > 0.68   < > 0.57   GFRESTIMATED >90 88 >90 83 81   < > 80 87  --  86  --  87   < > 89   < > 84   < > 89   GFRESTBLACK  --   --   --   --   --   --   --  >90  --  >90  --  >90   < > >90   < > >90   < > >90   BRANDEE  --  9.3  --   --  9.0  --  9.5  --    < >  --    < >  --   --  9.1  --  9.4  --  9.6   BILITOTAL 0.4  --  0.5 0.3 0.2   < > 0.4 0.3  --  0.4  --  0.4   < >  --    < > 0.3   < >  --    ALBUMIN 4.0  --  4.1 3.9 3.7   < > 3.6 3.6  --  4.2  --  4.1   < >  --    < > 4.0   < >  --    PROTTOTAL 7.7  --  7.7 7.7 7.8   < > 7.6 7.0  --  8.0  --  7.6   < >  --    < > 7.6   < >  --    ALKPHOS 42  --  46 45 51   < > 45 45  --  44  --  47   < >  --    < > 45   < >  --    AST 23  --  13 19 16   < > 13 22  --  21  --  25   < >  --    < > 19   < >  --    ALT 32  --  25 33 22   < > 23 36  --  34  --  36   < >  --    < > 22   < >  --     < > = values in this interval not displayed.     Calcium/VitaminD  Recent Labs   Lab Test 03/21/22  1041 10/13/21  1447 08/18/21  1054 03/18/21  1350 12/18/20  0923   BRANDEE 9.3 9.0 9.5   < > 9.6   VITDT  --  62 63  --  48    < > = values in this interval not displayed.     ESR/CRP  Recent Labs   Lab Test 04/29/22  1000 02/04/22  0832 12/08/21  1053   SED 15 14 16   CRP <2.9 <2.9 <2.9     Lipid Panel  Recent Labs   Lab Test 12/08/21  1053 11/27/20  1043 12/19/19  0957   CHOL 184 188 166   TRIG 101 142 113   HDL 77 89 89   LDL 87 71 54   NHDL 107 99 77     Hepatitis B  Recent Labs   Lab Test 09/15/15  1159   AUSAB 0.11   HBCAB Nonreactive   HEPBANG Nonreactive     Hepatitis C  Recent Labs   Lab Test  09/15/15  1159   HCVAB Nonreactive   Assay performance characteristics have not been established for newborns,   infants, and children       Tuberculosis Screening  Recent Labs   Lab Test 08/18/21  1054 05/07/18  1033 09/15/15  1200   TBRES Negative  --   --    TBRSLT  --  Negative Negative   TBAGN  --  0.00 0.00     Immunization History     Immunization History   Administered Date(s) Administered     COVID-19,PF,Pfizer (12+ Yrs) 02/09/2021, 03/03/2021     FLU 6-35 months 10/24/2006, 11/14/2007, 10/22/2008, 10/21/2009     Flu, Unspecified 10/20/2013     Influenza (High Dose) 3 valent vaccine 10/28/2013, 09/23/2014, 11/11/2015, 09/23/2016, 09/24/2019     Influenza (IIV3) PF 10/12/1999, 10/26/2001, 10/19/2002, 10/30/2003, 10/28/2004, 10/21/2005, 10/26/2007, 10/21/2009, 10/05/2011, 10/13/2012     Influenza Vaccine IM > 6 months Valent IIV4 (Alfuria,Fluzone) 09/24/2020     Influenza Vaccine Im 4yrs+ 4 Valent CCIIV4 09/28/2017, 09/27/2018     Influenza, Quad, High Dose, Pf, 65yr+ (Fluzone HD) 10/07/2021     Mantoux Tuberculin Skin Test 11/03/2006     Pneumo Conj 13-V (2010&after) 07/29/2015     Pneumococcal 23 valent 06/26/2000, 10/26/2001, 06/01/2007, 06/02/2010     TD (ADULT, 7+) 12/10/2008, 07/20/2009     Tdap (Adult) Unspecified Formulation 12/12/2018     Zoster vaccine recombinant adjuvanted (SHINGRIX) 12/12/2018, 02/14/2019          Chart documentation done in part with Dragon Voice recognition Software. Although reviewed after completion, some word and grammatical error may remain.      Video-Visit Details    Type of service:  Video Visit    Video Start Time: 9:35 AM    Video End Time: 10:04 AM    Originating Location (pt. Location): [place], [place]    Distant Location (provider location):  [place]    Platform used for Video Visit: Roxanne Byers MD

## 2022-05-02 NOTE — PATIENT INSTRUCTIONS
RHEUMATOLOGY    Dr. Nico Byers    [address] [place] [address]  Phone number: [phone]  Fax number: [phone]      Thank you for choosing [facility]!    Samantha Morales CMA Rheumatology

## 2022-05-03 ENCOUNTER — VIRTUAL VISIT (OUTPATIENT)
Dept: RHEUMATOLOGY | Facility: CLINIC | Age: 82
End: 2022-05-03
Payer: COMMERCIAL

## 2022-05-03 DIAGNOSIS — M54.50 CHRONIC BILATERAL LOW BACK PAIN, UNSPECIFIED WHETHER SCIATICA PRESENT: ICD-10-CM

## 2022-05-03 DIAGNOSIS — M05.79 RHEUMATOID ARTHRITIS INVOLVING MULTIPLE SITES WITH POSITIVE RHEUMATOID FACTOR (H): Primary | ICD-10-CM

## 2022-05-03 DIAGNOSIS — M81.0 OSTEOPOROSIS WITHOUT CURRENT PATHOLOGICAL FRACTURE, UNSPECIFIED OSTEOPOROSIS TYPE: ICD-10-CM

## 2022-05-03 DIAGNOSIS — G89.29 CHRONIC BILATERAL LOW BACK PAIN, UNSPECIFIED WHETHER SCIATICA PRESENT: ICD-10-CM

## 2022-05-03 DIAGNOSIS — Z79.899 HIGH RISK MEDICATION USE: ICD-10-CM

## 2022-05-03 PROCEDURE — 99215 OFFICE O/P EST HI 40 MIN: CPT | Mod: 95 | Performed by: INTERNAL MEDICINE

## 2022-05-03 RX ORDER — LEUCOVORIN CALCIUM 5 MG/1
5 TABLET ORAL
Qty: 13 TABLET | Refills: 2 | Status: SHIPPED | OUTPATIENT
Start: 2022-05-03 | End: 2022-08-02

## 2022-05-03 RX ORDER — METHOTREXATE 25 MG/.5ML
25 INJECTION, SOLUTION SUBCUTANEOUS
Qty: 2 ML | Refills: 6 | Status: SHIPPED | OUTPATIENT
Start: 2022-05-03 | End: 2022-08-02

## 2022-05-03 RX ORDER — UPADACITINIB 15 MG/1
15 TABLET, EXTENDED RELEASE ORAL DAILY
Qty: 30 TABLET | Refills: 6 | Status: SHIPPED | OUTPATIENT
Start: 2022-05-03 | End: 2022-08-02

## 2022-05-05 ENCOUNTER — NURSE TRIAGE (OUTPATIENT)
Dept: FAMILY MEDICINE | Facility: CLINIC | Age: 82
End: 2022-05-05
Payer: COMMERCIAL

## 2022-05-05 NOTE — TELEPHONE ENCOUNTER
"Patient transferred to writer for further triage. Writer was informed patient has a lump on her buttocks and was requesting an appointment. Per  she spoke to primary care provider and she is not able to see patient today and requested patient be triaged.    Patient states she just noticed this morning that she has a \"red spot on my butt\". She tried to look at it in a mirror but it was very difficult to see so details are limited by this. States there is a red spot on the \"crack of my butt\". It does not itch or hurt. States it seems like it is \"oblong\" and 1/2 - 1 inch in size and is raised and \"rough feeling\". States it is near the \"top of my crack\". Denies any other symptoms. States it is \"bright red\". Patient has hemorrhoids and she is using a cream. Patient asking if this could have \"run down\" and be causing this. Patient has been using this for a couple of days. It is a hydrocortisone cream. She has used this in the past with no issues that she is aware of. Advised home care per protocol but advised will send to primary care provider for review due to patient being concerned. Home care per protocol is simply continue to monitor and call back if present greater than 1 week.     Reason for Disposition    [1] Small swelling or lump AND [2] unexplained AND [3] present < 1 week    Additional Information    Negative: Sounds like a life-threatening emergency to the triager    Negative: Small growth, spot, bump, or pigmented area of skin (e.g., moles, skin tags, wart, melanoma, skin cancer)    Negative: Inguinal hernia previously diagnosed by a physician    Negative: Followed a skin injury    Negative: Follows an insect bite    Negative: Swelling of lymph node suspected    Negative: Swelling of vaccination site    Negative: Swelling of tongue    Negative: Swelling of lip    Negative: Swelling of eye    Negative: Swelling of entire face    Negative: Swelling of scrotum    Negative: Swelling of labia    Negative: " Swelling of surgical incision    Negative: Swelling of ankle joint    Negative: Swelling of elbow joint    Negative: Swelling of knee joint    Negative: Swelling with a skin rash    Negative: Patient sounds very sick or weak to the triager    Negative: SEVERE pain (e.g., excruciating)    Negative: [1] Swelling is painful to touch AND [2] fever    Negative: [1] Swelling is red AND [2] fever    Negative: [1] Swelling is red AND [2] size > 2 inches (5.0 cm) (Exception: itchy area of skin)    Negative: [1] Swelling of groin (inguinal area) AND [2] painful    Negative: [1] Swelling is painful to touch AND [2] no fever    Negative: Looks like a boil, infected sore, deep ulcer or other infected rash    Negative: [1] Small swelling or lump AND [2] unexplained AND [3] present > 1 week    Negative: [1] New-onset hernia suspected (reducible bulge in groin or abdomen; non-tender) AND [2] NO pain or vomiting    Protocols used: SKIN LUMP OR LOCALIZED SWELLING-A-AH

## 2022-05-05 NOTE — TELEPHONE ENCOUNTER
Gabriela Jefferson MD   Triage 2 hours ago (12:59 PM)     DN    DOES NOT SOUND LIKE PT HAS AN ULCERATED. WOUND.  Local care using warm pack and if not better or in fact worsening symptoms then should be seen by any provider in [facility].   If develop concerns about shingles, ulcer, infection, then that may prompt earlier evaluation.   Thanks.    Routing comment        Patient notified.    Megha Lennon RN

## 2022-05-23 DIAGNOSIS — E66.09 CLASS 1 OBESITY DUE TO EXCESS CALORIES WITH SERIOUS COMORBIDITY AND BODY MASS INDEX (BMI) OF 31.0 TO 31.9 IN ADULT: ICD-10-CM

## 2022-05-23 DIAGNOSIS — G89.29 OTHER CHRONIC PAIN: ICD-10-CM

## 2022-05-23 DIAGNOSIS — E66.811 CLASS 1 OBESITY DUE TO EXCESS CALORIES WITH SERIOUS COMORBIDITY AND BODY MASS INDEX (BMI) OF 31.0 TO 31.9 IN ADULT: ICD-10-CM

## 2022-05-23 DIAGNOSIS — G47.33 OSA (OBSTRUCTIVE SLEEP APNEA): Primary | ICD-10-CM

## 2022-05-26 ENCOUNTER — MYC MEDICAL ADVICE (OUTPATIENT)
Dept: FAMILY MEDICINE | Facility: CLINIC | Age: 82
End: 2022-05-26
Payer: COMMERCIAL

## 2022-05-26 DIAGNOSIS — B00.9 HERPES SIMPLEX VIRUS INFECTION: ICD-10-CM

## 2022-05-26 RX ORDER — VALACYCLOVIR HYDROCHLORIDE 500 MG/1
500 TABLET, FILM COATED ORAL 2 TIMES DAILY
Qty: 60 TABLET | Refills: 0 | Status: SHIPPED | OUTPATIENT
Start: 2022-05-26 | End: 2022-10-25

## 2022-05-26 NOTE — TELEPHONE ENCOUNTER
"Requested Prescriptions   Pending Prescriptions Disp Refills     valACYclovir (VALTREX) 500 MG tablet 180 tablet 1     Sig: Take 1 tablet (500 mg) by mouth 2 times daily       Antivirals for Herpes Protocol Passed - 5/26/2022  1:50 PM        Passed - Patient is age 12 or older        Passed - Recent (12 mo) or future (30 days) visit within the authorizing provider's specialty     Patient has had an office visit with the authorizing provider or a provider within the authorizing providers department within the previous 12 mos or has a future within next 30 days. See \"Patient Info\" tab in inbasket, or \"Choose Columns\" in Meds & Orders section of the refill encounter.              Passed - Medication is active on med list        Passed - Normal serum creatinine on file in past 12 months     Recent Labs   Lab Test 04/29/22  1000 05/07/18  1033 03/15/18  0000   CR 0.57   < >  --    CRPOC  --   --  0.7    < > = values in this interval not displayed.       Ok to refill medication if creatinine is low             Prescription approved per [facility] Refill Protocol.  IRENE SheehanN RENATO  [facility]  "

## 2022-05-31 ENCOUNTER — TELEPHONE (OUTPATIENT)
Dept: SLEEP MEDICINE | Facility: CLINIC | Age: 82
End: 2022-05-31
Payer: COMMERCIAL

## 2022-05-31 DIAGNOSIS — E66.09 CLASS 1 OBESITY DUE TO EXCESS CALORIES WITH SERIOUS COMORBIDITY AND BODY MASS INDEX (BMI) OF 31.0 TO 31.9 IN ADULT: ICD-10-CM

## 2022-05-31 DIAGNOSIS — G47.33 OSA (OBSTRUCTIVE SLEEP APNEA): Primary | ICD-10-CM

## 2022-05-31 DIAGNOSIS — E66.811 CLASS 1 OBESITY DUE TO EXCESS CALORIES WITH SERIOUS COMORBIDITY AND BODY MASS INDEX (BMI) OF 31.0 TO 31.9 IN ADULT: ICD-10-CM

## 2022-05-31 DIAGNOSIS — G89.29 OTHER CHRONIC PAIN: ICD-10-CM

## 2022-05-31 NOTE — TELEPHONE ENCOUNTER
SPOKE TO PT AND TURNED HER HUMIDITY DOWN  TO A LEVEL 2. ALSO WALKED HER THROUGH HOW TO ADJUST THE TUBING TEMP. SHE IS GOING TO TRY THIS TO SEE IF IT HELPS. IF NOT SHE WILL CALL US BACK.

## 2022-07-06 ASSESSMENT — ANXIETY QUESTIONNAIRES
6. BECOMING EASILY ANNOYED OR IRRITABLE: SEVERAL DAYS
GAD7 TOTAL SCORE: 2
4. TROUBLE RELAXING: NOT AT ALL
1. FEELING NERVOUS, ANXIOUS, OR ON EDGE: SEVERAL DAYS
8. IF YOU CHECKED OFF ANY PROBLEMS, HOW DIFFICULT HAVE THESE MADE IT FOR YOU TO DO YOUR WORK, TAKE CARE OF THINGS AT HOME, OR GET ALONG WITH OTHER PEOPLE?: SOMEWHAT DIFFICULT
7. FEELING AFRAID AS IF SOMETHING AWFUL MIGHT HAPPEN: NOT AT ALL
3. WORRYING TOO MUCH ABOUT DIFFERENT THINGS: NOT AT ALL
GAD7 TOTAL SCORE: 2
5. BEING SO RESTLESS THAT IT IS HARD TO SIT STILL: NOT AT ALL
GAD7 TOTAL SCORE: 2
2. NOT BEING ABLE TO STOP OR CONTROL WORRYING: NOT AT ALL
7. FEELING AFRAID AS IF SOMETHING AWFUL MIGHT HAPPEN: NOT AT ALL

## 2022-07-06 ASSESSMENT — PATIENT HEALTH QUESTIONNAIRE - PHQ9
SUM OF ALL RESPONSES TO PHQ QUESTIONS 1-9: 3
SUM OF ALL RESPONSES TO PHQ QUESTIONS 1-9: 3
10. IF YOU CHECKED OFF ANY PROBLEMS, HOW DIFFICULT HAVE THESE PROBLEMS MADE IT FOR YOU TO DO YOUR WORK, TAKE CARE OF THINGS AT HOME, OR GET ALONG WITH OTHER PEOPLE: SOMEWHAT DIFFICULT

## 2022-07-08 ENCOUNTER — OFFICE VISIT (OUTPATIENT)
Dept: PODIATRY | Facility: CLINIC | Age: 82
End: 2022-07-08
Payer: COMMERCIAL

## 2022-07-08 VITALS
WEIGHT: 210 LBS | BODY MASS INDEX: 34.99 KG/M2 | SYSTOLIC BLOOD PRESSURE: 122 MMHG | DIASTOLIC BLOOD PRESSURE: 64 MMHG | HEIGHT: 65 IN

## 2022-07-08 DIAGNOSIS — M79.671 BILATERAL FOOT PAIN: Primary | ICD-10-CM

## 2022-07-08 DIAGNOSIS — M79.672 BILATERAL FOOT PAIN: Primary | ICD-10-CM

## 2022-07-08 DIAGNOSIS — B07.0 PLANTAR WART OF BOTH FEET: ICD-10-CM

## 2022-07-08 PROCEDURE — 99213 OFFICE O/P EST LOW 20 MIN: CPT | Mod: 25 | Performed by: PODIATRIST

## 2022-07-08 PROCEDURE — 17110 DESTRUCTION B9 LES UP TO 14: CPT | Performed by: PODIATRIST

## 2022-07-08 NOTE — LETTER
"    7/8/2022         RE: Ginger Marshall  [address]  [facility] [facility] [address]        Dear Colleague,    Thank you for referring your patient, Ginger Marshall, to the [facility] P[facility]. Please see a copy of my visit note below.    ASSESSMENT:  Encounter Diagnoses   Name Primary?     Bilateral foot pain Yes     Plantar wart of both feet      MEDICAL DECISION MAKING:  The cause and nature of plantar warts was discussed.  It was explained that they are very resilient lesions and tend to require multiple treatments, regardless of type of treatment.  They sometimes will resolve without treatment.  Some people opt to not treat and see if they resolve.  Others choose to treat because they can spread, grow in size, and cause pain.     Treatment options discussed included:    1) self treatment via filing and use of a topical, over-the-counter preparation    2) excisional debridement with liquid nitrogen application    3) referral to [facility] or Dermatology    Ginger Marshall elected to starting treatment today with liquid nitrogen and a referral to dermatology.    Referral placed.    Procedure:      The procedure was discussed and written consent obtained.  The site was marked and the \"Time Out\" called.  The verrucoid lesion was sharply debrided to pinpoint bleeding via a #15 blade.  Liquid nitrogen was then applied, three applications total per wart (four warts). This was tolerated well by the patient.  A light dressing was applied.  Ginger Marshall was instructed to watch for increasing redness, drainage, and purulence and call the clinic if experienced.   Some discomfort is to be expected.  Returning to clinic in 2 weeks for ongoing treatment  is encouraged.     Disclaimer: This note consists of symbols derived from keyboarding, dictation and/or voice recognition software. As a result, there may be errors in the script that have gone undetected. Please consider this when interpreting " information found in this chart.    Sonu Lemons, WILLIAM, FACFAS, MS    [facility] of Podiatry/Foot & Ankle Surgery      ____________________________________________________________________    HPI:       Ginger presents today concerned about plantar warts involving both feet.  The lesions cause burning pain with walking.  At worst, pain is rated a 5 out of 10.  She has tried over-the-counter wart removal preparation involving an adhesive.  She has a difficult time keeping these in place.  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     History of total knee replacement 2009     Menopause 1989    late 40's     Migraine 2014    resolved     Multinodular goiter      Other chronic pain     joints     Pelvic fracture (H) 2014     Rheumatic mitral stenosis      Rheumatoid arteritis (H)      S/P lumbar fusion 2017     Sleep apnea      Vitamin B12 deficiency    *  *  Past Surgical History:   Procedure Laterality Date     ABDOMEN SURGERY      c sections     ARTHROSCOPY KNEE Left      ARTHROSCOPY KNEE RT/LT  2006    left     BACK SURGERY  2013    disc     BIOPSY BREAST       BREAST SURGERY      lumpectomy 's     BREAST SURGERY      lumpectomy     CATARACT EXTRACTION Bilateral      CATARACT IOL, RT/LT Bilateral 2010 aproximately      SECTION  1966      SECTION  1972     COLONOSCOPY  2009,     COLONOSCOPY       FINGER SURGERY Right 2019    Procedure: DISTAL ULNA RESECTION, RIGHT MIDDLE  SILASTIC METACARPALPHALANGEAL EXCHANGE AND RIGHT ELBOW NODULE EXCISION.;  Surgeon: Hilario Redmond MD;  Location: [facility];  Service: Orthopedics     FOOT SURGERY Left      GYN SURGERY  [age]     HAND SURGERY Right      HAND SURGERY Right      HC ESOPH/GAS REFLUX TEST W NASAL IMPED >1 HR  02/01/2012    Procedure:ESOPHAGEAL IMPEDENCE FUNCTION TEST WITH 24 HOUR PH GREATER THAN 1 HOUR; Surgeon:KAYKAY JULIO; Location:[facility]     IR LUMBAR EPIDURAL STEROID INJECTION       IR TRANSLAMINAR EPIDURAL LUMBAR INJ INCL IMAGING  05/02/2012    LESI L5-S1 at [facility]     LUMBAR FUSION       OPTICAL TRACKING SYSTEM FUSION POSTERIOR SPINE LUMBAR N/A 04/03/2017    Procedure: OPTICAL TRACKING SYSTEM FUSION SPINE POSTERIOR LUMBAR ONE LEVEL;  Surgeon: Anthony Michele MD;  Location:  OR     ORTHOPEDIC SURGERY  1991    left foot surgery     ORTHOPEDIC SURGERY  1995    R MCP surgery     ORTHOPEDIC SURGERY  2007    right knee total replacement     TOTAL HIP ARTHROPLASTY Right      TOTAL KNEE ARTHROPLASTY Left      TOTAL KNEE ARTHROPLASTY Right      ZZC ANESTH,TOTAL HIP ARTHROPLASTY  2010    Rt hip     ZZC HAND/FINGER SURGERY UNLISTED  11/2005    right hand     ZZC TOTAL KNEE ARTHROPLASTY  2006    left   *  *  Current Outpatient Medications   Medication Sig Dispense Refill     acetaminophen (TYLENOL) 500 MG tablet Take 1-2 tablets (500-1,000 mg) by mouth 2 times daily       acetaminophen (TYLENOL) 500 MG tablet Take 2 tablets (1,000 mg) by mouth every 8 hours as needed for pain 100 tablet 0     atorvastatin (LIPITOR) 40 MG tablet Take 1 tablet (40 mg) by mouth daily 90 tablet 3     Bioflavonoid Products (VITAMIN C) CHEW        busPIRone (BUSPAR) 10 MG tablet Take 1 tablet by mouth daily       cetirizine (ZYRTEC) 10 MG tablet Take 1 tablet by mouth daily as needed       Cholecalciferol (VITAMIN D-3 PO) Take 1,000 Units by mouth 2 times daily       desvenlafaxine (PRISTIQ) 100 MG 24 hr tablet Take 1 tablet (100 mg) by  "mouth daily 90 tablet 1     Ferrous Sulfate 324 (65 Fe) MG TBEC Take 324 mg by mouth daily (Patient not taking: Reported on 3/23/2022)       HYDROcodone-acetaminophen (NORCO) 5-325 MG tablet Take 1-2 tablets by mouth daily as needed       hydrocortisone, Perianal, (HYDROCORTISONE) 2.5 % cream Place rectally 2 times daily as needed for hemorrhoids 30 g 1     leucovorin (WELLCOVORIN) 5 MG tablet Take 1 tablet (5 mg) by mouth every 7 days . Take 24 hours after taking Methotrexate each week. 13 tablet 2     Methotrexate, PF, (RASUVO) 25 MG/0.5ML autoinjector Inject 0.5 mLs (25 mg) Subcutaneous every 7 days . Hold for signs of infection, and seek medical attention. 2 mL 6     naproxen sodium (ANAPROX) 220 MG tablet Take 2 tablets by mouth 2 times daily as needed       pantoprazole (PROTONIX) 40 MG EC tablet TAKE ONE TABLET BY MOUTH ONCE DAILY 30-60 MINUTES BEFORE A MEAL 90 tablet 1     pregabalin (LYRICA) 50 MG capsule Take 1 capsule by mouth 2 times daily       sucralfate (CARAFATE) 1 GM tablet TAKE ONE TABLET BY MOUTH FOUR TIMES A DAY AS NEEDED HEATBURN 120 tablet 1     tiZANidine (ZANAFLEX) 2 MG tablet Take 1 tablet by mouth 3 times daily as needed       topiramate (TOPAMAX) 50 MG tablet Take 1.5 tablets (75 mg) by mouth 2 times daily 270 tablet 11     Upadacitinib ER (RINVOQ) 15 MG TB24 Take 15 mg by mouth daily 30 tablet 6     valACYclovir (VALTREX) 500 MG tablet Take 1 tablet (500 mg) by mouth 2 times daily 60 tablet 0         EXAM:    Vitals: /64   Ht 1.651 m (5' 5\")   Wt 95.3 kg (210 lb)   BMI 34.95 kg/m    BMI: Body mass index is 34.95 kg/m .    Constitutional:  Ginger MONTEMAYOR Sweats is in no apparent distress, appears well-nourished.  Cooperative with history and physical exam.    Vascular:  Pedal pulses are palpable for both the DP and PT arteries.  CFT < 3 sec.  No edema.      Neuro: Light touch sensation is intact to the L4, L5, S1 distributions  No evidence of weakness, spasticity, or contracture in the " lower extremities.     Derm: Normal texture and turgor.  No erythema, ecchymosis, or cyanosis.  No open lesions.     Verrucoid appearing lesions at the following locations:  1) sub right first metatarsal head. This is the largest, 0.5cm diameter  2) some left second metatarsal head  3) sub first metatarsal head  4) plantar left heel            Again, thank you for allowing me to participate in the care of your patient.        Sincerely,        Sonu Lemons DPM

## 2022-07-08 NOTE — PATIENT INSTRUCTIONS
"Thank you for choosing [facility] Podiatry / Foot & Ankle Surgery!    DR. SCHMIDT'S CLINIC LOCATIONS:     [facility] TRIAGE LINE: [phone]   [address] APPOINTMENTS: [phone]   [place] [address] RADIOLOGY: [phone]    SET UP SURGERY: [phone]    BILLING QUESTIONS: [phone]   [facility] FAX: [phone] [id number]    [place] [address]      PLANTAR WARTS   Plantar warts are a viral skin infection. As with most viral infections, there is no cure, only treatment. The virus is also quite superficial, so the immune system cannot recognize it as a problem. Therefore, treatment is aimed at causing an insult that the immune system can recognize. Typically plantar warts are treated by applying liquid nitrogen, to cause a local frostbite injury, or a strong acid, to cause a blister. Sometimes medications or creams that boost immune response are prescribed.     If your treatment was with the strong acid (phenol, tri-chlor, cantharadine), you will likely develop a very tender, brown fluid-filled blister. This is normal and the blister should be allowed to break on its own and dry out. Once it is dried out, use a pumice stone to trim away the dry skin and use an over-the-counter salicylic acid product in between appointments. Call the clinic if you have any concerns regarding your blister.     The regimen between office visits should include: daily trimming with the pumice stone, application of the OTC product, and dressing with a small band-aid or piece of duct tape. You should repeat this daily until your next visit in 2-3 weeks. There is a prescription cream as well (Aldera) that can be applied between visits. This can sometimes cause surrounding skin irritation.    Duct tape is a non invasive treatment to warts. Usually requires reapplying it every night. It helps to \"choke out\" the wart. Trimming the wart down with a razor first may also help.     Again, " because there is no cure for warts, you may have 6 or more visits depending on how your wart responds. Please call the clinic if you have questions or concerns.

## 2022-07-08 NOTE — PROGRESS NOTES
"ASSESSMENT:  Encounter Diagnoses   Name Primary?     Bilateral foot pain Yes     Plantar wart of both feet      MEDICAL DECISION MAKING:  The cause and nature of plantar warts was discussed.  It was explained that they are very resilient lesions and tend to require multiple treatments, regardless of type of treatment.  They sometimes will resolve without treatment.  Some people opt to not treat and see if they resolve.  Others choose to treat because they can spread, grow in size, and cause pain.     Treatment options discussed included:    1) self treatment via filing and use of a topical, over-the-counter preparation    2) excisional debridement with liquid nitrogen application    3) referral to [facility] or Dermatology    Ginger Marshall elected to starting treatment today with liquid nitrogen and a referral to dermatology.    Referral placed.    Procedure:      The procedure was discussed and written consent obtained.  The site was marked and the \"Time Out\" called.  The verrucoid lesion was sharply debrided to pinpoint bleeding via a #15 blade.  Liquid nitrogen was then applied, three applications total per wart (four warts). This was tolerated well by the patient.  A light dressing was applied.  Ginger Marshall was instructed to watch for increasing redness, drainage, and purulence and call the clinic if experienced.   Some discomfort is to be expected.  Returning to clinic in 2 weeks for ongoing treatment  is encouraged.     Disclaimer: This note consists of symbols derived from keyboarding, dictation and/or voice recognition software. As a result, there may be errors in the script that have gone undetected. Please consider this when interpreting information found in this chart.    Sonu Lemons DPM, FACFAS, MS    [facility] Podiat[facility] & [facility]le Surgery      ____________________________________________________________________    HPI:       Ginger presents today concerned about plantar warts " involving both feet.  The lesions cause burning pain with walking.  At worst, pain is rated a 5 out of 10.  She has tried over-the-counter wart removal preparation involving an adhesive.  She has a difficult time keeping these in place.  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     History of total knee replacement 2009     Menopause 1989    late 40's     Migraine 2014    resolved     Multinodular goiter      Other chronic pain     joints     Pelvic fracture (H) 2014     Rheumatic mitral stenosis      Rheumatoid arteritis (H)      S/P lumbar fusion 2017     Sleep apnea      Vitamin B12 deficiency    *  *  Past Surgical History:   Procedure Laterality Date     ABDOMEN SURGERY      c sections     ARTHROSCOPY KNEE Left      ARTHROSCOPY KNEE RT/LT  2006    left     BACK SURGERY  2013    disc     BIOPSY BREAST       BREAST SURGERY      lumpectomy 's     BREAST SURGERY      lumpectomy     CATARACT EXTRACTION Bilateral      CATARACT IOL, RT/LT Bilateral 2010 aproximately      SECTION  1966      SECTION  1972     COLONOSCOPY  2009,     COLONOSCOPY       FINGER SURGERY Right 2019    Procedure: DISTAL ULNA RESECTION, RIGHT MIDDLE SILASTIC METACARPALPHALANGEAL EXCHANGE AND RIGHT ELBOW NODULE EXCISION.;  Surgeon: Hilario Redmond MD;  Location: [facility] OR;  Service: Orthopedics     FOOT SURGERY Left      GYN SURGERY  [age]     HAND SURGERY Right      HAND SURGERY Right        ESOPH/GAS REFLUX TEST W NASAL IMPED >1 HR  02/01/2012    Procedure:ESOPHAGEAL IMPEDENCE FUNCTION TEST WITH 24 HOUR PH GREATER THAN 1 HOUR; Surgeon:KAYKAY JULIO; Location:[facility]     IR LUMBAR EPIDURAL STEROID INJECTION       IR TRANSLAMINAR EPIDURAL LUMBAR INJ INCL IMAGING  05/02/2012    LESI L5-S1 at [facility]     LUMBAR FUSION       OPTICAL TRACKING SYSTEM FUSION POSTERIOR SPINE LUMBAR N/A 04/03/2017    Procedure: OPTICAL TRACKING SYSTEM FUSION SPINE POSTERIOR LUMBAR ONE LEVEL;  Surgeon: Anthony Michele MD;  Location: [facility]     ORTHOPEDIC SURGERY  1991    left foot surgery     ORTHOPEDIC SURGERY  1995    R MCP surgery     ORTHOPEDIC SURGERY  2007    right knee total replacement     TOTAL HIP ARTHROPLASTY Right      TOTAL KNEE ARTHROPLASTY Left      TOTAL KNEE ARTHROPLASTY Right      ZZC ANESTH,TOTAL HIP ARTHROPLASTY  2010    Rt hip     ZZC HAND/FINGER SURGERY UNLISTED  11/2005    right hand     ZZC TOTAL KNEE ARTHROPLASTY  2006    left   *  *  Current Outpatient Medications   Medication Sig Dispense Refill     acetaminophen (TYLENOL) 500 MG tablet Take 1-2 tablets (500-1,000 mg) by mouth 2 times daily       acetaminophen (TYLENOL) 500 MG tablet Take 2 tablets (1,000 mg) by mouth every 8 hours as needed for pain 100 tablet 0     atorvastatin (LIPITOR) 40 MG tablet Take 1 tablet (40 mg) by mouth daily 90 tablet 3     Bioflavonoid Products (VITAMIN C) CHEW        busPIRone (BUSPAR) 10 MG tablet Take 1 tablet by mouth daily       cetirizine (ZYRTEC) 10 MG tablet Take 1 tablet by mouth daily as needed       Cholecalciferol (VITAMIN D-3 PO) Take 1,000 Units by mouth 2 times daily       desvenlafaxine (PRISTIQ) 100 MG 24 hr tablet Take 1 tablet (100 mg) by mouth daily 90 tablet 1     Ferrous Sulfate 324 (65 Fe) MG TBEC Take 324 mg by mouth daily (Patient not taking: Reported on 3/23/2022)       HYDROcodone-acetaminophen (NORCO) 5-325 MG tablet Take 1-2 tablets by mouth daily as needed       hydrocortisone,  "Perianal, (HYDROCORTISONE) 2.5 % cream Place rectally 2 times daily as needed for hemorrhoids 30 g 1     leucovorin (WELLCOVORIN) 5 MG tablet Take 1 tablet (5 mg) by mouth every 7 days . Take 24 hours after taking Methotrexate each week. 13 tablet 2     Methotrexate, PF, (RASUVO) 25 MG/0.5ML autoinjector Inject 0.5 mLs (25 mg) Subcutaneous every 7 days . Hold for signs of infection, and seek medical attention. 2 mL 6     naproxen sodium (ANAPROX) 220 MG tablet Take 2 tablets by mouth 2 times daily as needed       pantoprazole (PROTONIX) 40 MG EC tablet TAKE ONE TABLET BY MOUTH ONCE DAILY 30-60 MINUTES BEFORE A MEAL 90 tablet 1     pregabalin (LYRICA) 50 MG capsule Take 1 capsule by mouth 2 times daily       sucralfate (CARAFATE) 1 GM tablet TAKE ONE TABLET BY MOUTH FOUR TIMES A DAY AS NEEDED HEATBURN 120 tablet 1     tiZANidine (ZANAFLEX) 2 MG tablet Take 1 tablet by mouth 3 times daily as needed       topiramate (TOPAMAX) 50 MG tablet Take 1.5 tablets (75 mg) by mouth 2 times daily 270 tablet 11     Upadacitinib ER (RINVOQ) 15 MG TB24 Take 15 mg by mouth daily 30 tablet 6     valACYclovir (VALTREX) 500 MG tablet Take 1 tablet (500 mg) by mouth 2 times daily 60 tablet 0         EXAM:    Vitals: /64   Ht 1.651 m (5' 5\")   Wt 95.3 kg (210 lb)   BMI 34.95 kg/m    BMI: Body mass index is 34.95 kg/m .    Constitutional:  Ginger MONTEMAYOR Sweats is in no apparent distress, appears well-nourished.  Cooperative with history and physical exam.    Vascular:  Pedal pulses are palpable for both the DP and PT arteries.  CFT < 3 sec.  No edema.      Neuro: Light touch sensation is intact to the L4, L5, S1 distributions  No evidence of weakness, spasticity, or contracture in the lower extremities.     Derm: Normal texture and turgor.  No erythema, ecchymosis, or cyanosis.  No open lesions.     Verrucoid appearing lesions at the following locations:  1) sub right first metatarsal head. This is the largest, 0.5cm diameter  2) some " left second metatarsal head  3) sub first metatarsal head  4) plantar left heel

## 2022-07-12 ENCOUNTER — OFFICE VISIT (OUTPATIENT)
Dept: FAMILY MEDICINE | Facility: CLINIC | Age: 82
End: 2022-07-12
Payer: COMMERCIAL

## 2022-07-12 VITALS
BODY MASS INDEX: 35.28 KG/M2 | OXYGEN SATURATION: 97 % | RESPIRATION RATE: 16 BRPM | SYSTOLIC BLOOD PRESSURE: 110 MMHG | HEART RATE: 79 BPM | WEIGHT: 212 LBS | DIASTOLIC BLOOD PRESSURE: 70 MMHG

## 2022-07-12 DIAGNOSIS — F33.1 MODERATE EPISODE OF RECURRENT MAJOR DEPRESSIVE DISORDER (H): ICD-10-CM

## 2022-07-12 DIAGNOSIS — M05.79 RHEUMATOID ARTHRITIS INVOLVING MULTIPLE SITES WITH POSITIVE RHEUMATOID FACTOR (H): ICD-10-CM

## 2022-07-12 DIAGNOSIS — K21.9 GASTROESOPHAGEAL REFLUX DISEASE WITHOUT ESOPHAGITIS: ICD-10-CM

## 2022-07-12 DIAGNOSIS — Z79.899 HIGH RISK MEDICATION USE: ICD-10-CM

## 2022-07-12 DIAGNOSIS — F41.9 ANXIETY: Primary | ICD-10-CM

## 2022-07-12 DIAGNOSIS — L30.4 INTERTRIGO: ICD-10-CM

## 2022-07-12 LAB
BASOPHILS # BLD AUTO: 0 10E3/UL (ref 0–0.2)
BASOPHILS NFR BLD AUTO: 0 %
CRP SERPL-MCNC: <3 MG/L
EOSINOPHIL # BLD AUTO: 0.2 10E3/UL (ref 0–0.7)
EOSINOPHIL NFR BLD AUTO: 4 %
ERYTHROCYTE [DISTWIDTH] IN BLOOD BY AUTOMATED COUNT: 14.9 % (ref 10–15)
ERYTHROCYTE [SEDIMENTATION RATE] IN BLOOD BY WESTERGREN METHOD: 15 MM/HR (ref 0–30)
HCT VFR BLD AUTO: 35.8 % (ref 35–47)
HGB BLD-MCNC: 11.3 G/DL (ref 11.7–15.7)
LYMPHOCYTES # BLD AUTO: 1.2 10E3/UL (ref 0.8–5.3)
LYMPHOCYTES NFR BLD AUTO: 23 %
MCH RBC QN AUTO: 31.2 PG (ref 26.5–33)
MCHC RBC AUTO-ENTMCNC: 31.6 G/DL (ref 31.5–36.5)
MCV RBC AUTO: 99 FL (ref 78–100)
MONOCYTES # BLD AUTO: 0.8 10E3/UL (ref 0–1.3)
MONOCYTES NFR BLD AUTO: 16 %
NEUTROPHILS # BLD AUTO: 3.1 10E3/UL (ref 1.6–8.3)
NEUTROPHILS NFR BLD AUTO: 58 %
PLATELET # BLD AUTO: 341 10E3/UL (ref 150–450)
RBC # BLD AUTO: 3.62 10E6/UL (ref 3.8–5.2)
WBC # BLD AUTO: 5.4 10E3/UL (ref 4–11)

## 2022-07-12 PROCEDURE — 36415 COLL VENOUS BLD VENIPUNCTURE: CPT | Performed by: STUDENT IN AN ORGANIZED HEALTH CARE EDUCATION/TRAINING PROGRAM

## 2022-07-12 PROCEDURE — 85025 COMPLETE CBC W/AUTO DIFF WBC: CPT | Performed by: STUDENT IN AN ORGANIZED HEALTH CARE EDUCATION/TRAINING PROGRAM

## 2022-07-12 PROCEDURE — 82565 ASSAY OF CREATININE: CPT | Performed by: STUDENT IN AN ORGANIZED HEALTH CARE EDUCATION/TRAINING PROGRAM

## 2022-07-12 PROCEDURE — 85652 RBC SED RATE AUTOMATED: CPT | Performed by: STUDENT IN AN ORGANIZED HEALTH CARE EDUCATION/TRAINING PROGRAM

## 2022-07-12 PROCEDURE — 80076 HEPATIC FUNCTION PANEL: CPT | Performed by: STUDENT IN AN ORGANIZED HEALTH CARE EDUCATION/TRAINING PROGRAM

## 2022-07-12 PROCEDURE — 96127 BRIEF EMOTIONAL/BEHAV ASSMT: CPT | Performed by: STUDENT IN AN ORGANIZED HEALTH CARE EDUCATION/TRAINING PROGRAM

## 2022-07-12 PROCEDURE — 86140 C-REACTIVE PROTEIN: CPT | Performed by: STUDENT IN AN ORGANIZED HEALTH CARE EDUCATION/TRAINING PROGRAM

## 2022-07-12 PROCEDURE — 99214 OFFICE O/P EST MOD 30 MIN: CPT | Performed by: STUDENT IN AN ORGANIZED HEALTH CARE EDUCATION/TRAINING PROGRAM

## 2022-07-12 RX ORDER — DESVENLAFAXINE 100 MG/1
100 TABLET, EXTENDED RELEASE ORAL DAILY
Qty: 90 TABLET | Refills: 1 | Status: SHIPPED | OUTPATIENT
Start: 2022-07-12 | End: 2023-01-16

## 2022-07-12 RX ORDER — HYDROCORTISONE 2.5 %
CREAM (GRAM) TOPICAL 2 TIMES DAILY
Qty: 30 G | Refills: 0 | Status: SHIPPED | OUTPATIENT
Start: 2022-07-12 | End: 2022-08-12

## 2022-07-12 RX ORDER — BUSPIRONE HYDROCHLORIDE 10 MG/1
10 TABLET ORAL DAILY
Qty: 30 TABLET | Refills: 1 | Status: SHIPPED | OUTPATIENT
Start: 2022-07-12 | End: 2022-08-12

## 2022-07-12 RX ORDER — PANTOPRAZOLE SODIUM 40 MG/1
TABLET, DELAYED RELEASE ORAL
Qty: 90 TABLET | Refills: 1 | Status: SHIPPED | OUTPATIENT
Start: 2022-07-12 | End: 2023-01-16

## 2022-07-12 ASSESSMENT — PATIENT HEALTH QUESTIONNAIRE - PHQ9
10. IF YOU CHECKED OFF ANY PROBLEMS, HOW DIFFICULT HAVE THESE PROBLEMS MADE IT FOR YOU TO DO YOUR WORK, TAKE CARE OF THINGS AT HOME, OR GET ALONG WITH OTHER PEOPLE: SOMEWHAT DIFFICULT
SUM OF ALL RESPONSES TO PHQ QUESTIONS 1-9: 3

## 2022-07-12 ASSESSMENT — ENCOUNTER SYMPTOMS: BACK PAIN: 1

## 2022-07-12 ASSESSMENT — ANXIETY QUESTIONNAIRES: GAD7 TOTAL SCORE: 2

## 2022-07-12 ASSESSMENT — PAIN SCALES - GENERAL: PAINLEVEL: NO PAIN (0)

## 2022-07-12 NOTE — PROGRESS NOTES
"  Assessment & Plan     Anxiety  Moderate episode of recurrent major depressive disorder (H)  Noticing increased stress and anxiety surrounding family situation (see HPI for more details). Currently only taking desvenlafaxine. Hasn't taken buspirone for several months. GAD7 of 2, PHQ9 of 3 today. Will restart buspirone and refer for therapy. Follow up in one month to reassess medication.   - desvenlafaxine (PRISTIQ) 100 MG 24 hr tablet  Dispense: 90 tablet; Refill: 1  - busPIRone (BUSPAR) 10 MG tablet  Dispense: 30 tablet; Refill: 1  - referral mental health    Intertrigo  Also discussed preventative measures.  - hydrocortisone 2.5 % cream  Dispense: 30 g; Refill: 0    Gastroesophageal reflux disease without esophagitis  Working for her GERD. Notices reflux if not taking. Discussed long term risks of PPI. Already has osteoporosis and UTD on pneumococcal vaccine.  - pantoprazole (PROTONIX) 40 MG EC tablet  Dispense: 90 tablet; Refill: 1    BMI:   Estimated body mass index is 35.28 kg/m  as calculated from the following:    Height as of 7/8/22: 1.651 m (5' 5\").    Weight as of this encounter: 96.2 kg (212 lb).     Return in about 4 weeks (around 8/9/2022) for Follow up mood.    Duy Leyva MD  [facility]  7/12/2022    Alejandro Miles is a 82 year old presenting for the following health issues:    Back Pain and Recheck Medication    Back Pain     History of Present Illness       Mental Health Follow-up:  Patient presents to follow-up on Depression & Anxiety.Patient's depression since last visit has been:  Worse  The patient is not having other symptoms associated with depression.  Patient's anxiety since last visit has been:  Worse  The patient is not having other symptoms associated with anxiety.  Any significant life events: relationship concerns  Patient is feeling anxious or having panic attacks.  Patient has no concerns about alcohol or drug use.    Reason for visit:  Anxiety and " medication check    She eats 0-1 servings of fruits and vegetables daily.She consumes 0 sweetened beverage(s) daily.She exercises with enough effort to increase her heart rate 9 or less minutes per day.  She exercises with enough effort to increase her heart rate 3 or less days per week.   She is taking medications regularly.    Today's PHQ-9         PHQ-9 Total Score: 3    PHQ-9 Q9 Thoughts of better off dead/self-harm past 2 weeks :   Not at all    How difficult have these problems made it for you to do your work, take care of things at home, or get along with other people: Somewhat difficult  Today's SPRING-7 Score: 2     Mood  Worsened with situation of her sister.  Her sister lives in [place], [place] at assisted living  Wants to move near patient and her daughter  But wont allow information to be shared thus stuck in [place]  Is making Ginger extremely frustrated and doesn't know what to do for her at this point  jesusita daughter is being made the power of  for this sister  Stopped buspirone months ago.  Right now pristiq only    Rash  Onset of rash was 3 weeks ago  Location of the rash: under breast, groin, thigh and vulva.  Associated symptoms include: blisters, fever, itching, pain and redness.  Symptoms appear to be stable.  Therapies tried to improve the rash: OTC Antihistamines  Recent exposure history: none known .    Had groin rash for the past 3 weeks  Is itchy and burns. Is sore.   In the crease.   In [place], given something for herpes. Still there.    Under breast - noticed on Thurs/Fri of last week.   Really sore and red. Didn't want to put bra on.  Put on cortisone cream just a couple of times, seems to be going away except spot that looks like open sore.    Review of Systems   Musculoskeletal: Positive for back pain.          Objective    There were no vitals taken for this visit.  There is no height or weight on file to calculate BMI.     Physical Exam   GENERAL: healthy, alert and no  distress  HEAD: Normocephalic, atraumatic.   EYES: Normal conjunctivae, sclera.   RESP: Normal respiratory effort  MSK: no gross musculoskeletal defects noted.  SKIN: 10x6cm erythematous patch in R inframammary crease. Erythematous patch in R groin crease. No obvious satellite papules noted.    EXT: Warm and well perfused.   NEURO: CNII-XII grossly intact. No focal deficits.  PSYCH: Groomed, dressed appropriately for weather. Normal mood with consistent affect.

## 2022-07-13 LAB
ALBUMIN SERPL-MCNC: 4 G/DL (ref 3.4–5)
ALP SERPL-CCNC: 41 U/L (ref 40–150)
ALT SERPL W P-5'-P-CCNC: 32 U/L (ref 0–50)
AST SERPL W P-5'-P-CCNC: 24 U/L (ref 0–45)
BILIRUB DIRECT SERPL-MCNC: 0.1 MG/DL (ref 0–0.2)
BILIRUB SERPL-MCNC: 0.3 MG/DL (ref 0.2–1.3)
CREAT SERPL-MCNC: 0.64 MG/DL (ref 0.52–1.04)
GFR SERPL CREATININE-BSD FRML MDRD: 89 ML/MIN/1.73M2
PROT SERPL-MCNC: 7.4 G/DL (ref 6.8–8.8)

## 2022-07-14 ENCOUNTER — TELEPHONE (OUTPATIENT)
Dept: BEHAVIORAL HEALTH | Facility: CLINIC | Age: 82
End: 2022-07-14

## 2022-07-14 NOTE — TELEPHONE ENCOUNTER
First attempt at reaching patient. Unable to leave voicemail. Coordinator sent [employer] message to patient re: TC referral.     Dahianakiersten John  Transition Clinic Coordinator  Date and Time: 07/14/22 1:15 PM        ----- Message from Nikko Solorzano sent at 7/14/2022  1:02 PM CDT -----  Transition Clinic Referral   [place] Only   Limited [place] Availability    Type of Referral:    __X__Therapy Only   ____Medication Only: Referral will automatically be declined if no next level of care scheduled. Suboxone and Opioid management referrals are automatically denied.  ____Therapy & Medication:  Referral will automatically be declined if no next level of care scheduled. Suboxone and Opioid management referrals are automatically denied.  ____Diagnostic Assessment     Suboxone and Opioid Management Referrals are Automatically Denied    TC Psychiatry cannot see patients who do not have active medical insurance    Referring Provider Name: Nikko Solorzano    Clinician completing the assessment. Duy Leyva    Referring Provider: [facility] PROVIDER    If known, referring provider contact name: Duy Leyva; Phone Number:   Service Line/Location:     Reason for Transition Clinic Referral:     Next Level of Care Patient Will Be Transitioned To:   Provider(s)  Location 12/5/2022 Status: Scheduled     Time: 11:00 AM Length: 60    Visit Type: ADULT PSYCHOTHERAPY NEW [[id number]] Copay: $0.00    Provider: Rocío Arenas LICSW Department: [facility]    Bill Area: Psychology CS    Encounter #: [id number] Notes: Video My chart  Printed on:            TC Psychiatry cannot see patients who do not have active medical insurance    What Would Be Helpful from the Transition Clinic: bridge gap     Needs: NO    Does Patient Have Access to Technology: yes    Patient E-mail Address: [email or web address]    Current Patient Phone Number: [phone];     Clinician Gender Preference (if applicable): YES: female    Nikko  NITISH Solorzano

## 2022-07-15 ENCOUNTER — TELEPHONE (OUTPATIENT)
Dept: BEHAVIORAL HEALTH | Facility: CLINIC | Age: 82
End: 2022-07-15

## 2022-07-15 NOTE — TELEPHONE ENCOUNTER
Writer spoke with pt and scheduled initial TC therapy appointment on 07/21/22 @ 2:30 pm. Writer sent intake documents via [employer]. Writer will reply to referral source.Tracker completed.    Oumou Naranjo  07/15/22  859    ----- Message from Nikko Solorzano sent at 7/14/2022  1:02 PM CDT -----  Transition Clinic Referral   [place] Only   Limited [place] Availability    Type of Referral:    __X__Therapy Only   ____Medication Only: Referral will automatically be declined if no next level of care scheduled. Suboxone and Opioid management referrals are automatically denied.  ____Therapy & Medication:  Referral will automatically be declined if no next level of care scheduled. Suboxone and Opioid management referrals are automatically denied.  ____Diagnostic Assessment     Suboxone and Opioid Management Referrals are Automatically Denied    TC Psychiatry cannot see patients who do not have active medical insurance    Referring Provider Name: Nikko Solorzano    Clinician completing the assessment. Duy Leyva    Referring Provider: [facility] PROVIDER    If known, referring provider contact name: Duy Leyva; Phone Number:   Service Line/Location:     Reason for Transition Clinic Referral:     Next Level of Care Patient Will Be Transitioned To:   Provider(s)  Location 12/5/2022 Status: Scheduled     Time: 11:00 AM Length: 60    Visit Type: ADULT PSYCHOTHERAPY NEW [[id number]] Copay: $0.00    Provider: Rocío Arenas LICSW Department: [facility]    Bill Area: Psychology CS    Encounter #: [id number] Notes: Video My chart  Printed on:            TC Psychiatry cannot see patients who do not have active medical insurance    What Would Be Helpful from the Transition Clinic: bridge gap     Needs: NO    Does Patient Have Access to Technology: yes    Patient E-mail Address: [email or web address]    Current Patient Phone Number: [phone];     Clinician Gender Preference (if applicable): YES:  female    Nikko Solorzano

## 2022-07-21 ENCOUNTER — TELEPHONE (OUTPATIENT)
Dept: SLEEP MEDICINE | Facility: CLINIC | Age: 82
End: 2022-07-21

## 2022-07-21 ENCOUNTER — VIRTUAL VISIT (OUTPATIENT)
Dept: BEHAVIORAL HEALTH | Facility: CLINIC | Age: 82
End: 2022-07-21
Payer: COMMERCIAL

## 2022-07-21 DIAGNOSIS — E66.811 CLASS 1 OBESITY DUE TO EXCESS CALORIES WITH SERIOUS COMORBIDITY AND BODY MASS INDEX (BMI) OF 31.0 TO 31.9 IN ADULT: ICD-10-CM

## 2022-07-21 DIAGNOSIS — G89.29 OTHER CHRONIC PAIN: ICD-10-CM

## 2022-07-21 DIAGNOSIS — F41.1 GENERALIZED ANXIETY DISORDER: Primary | ICD-10-CM

## 2022-07-21 DIAGNOSIS — G47.33 OSA (OBSTRUCTIVE SLEEP APNEA): Primary | ICD-10-CM

## 2022-07-21 DIAGNOSIS — E66.09 CLASS 1 OBESITY DUE TO EXCESS CALORIES WITH SERIOUS COMORBIDITY AND BODY MASS INDEX (BMI) OF 31.0 TO 31.9 IN ADULT: ICD-10-CM

## 2022-07-21 NOTE — PROGRESS NOTES
[facility]   Mental Health & Addiction Services     Progress Note - Initial Visit    Patient  Name:  Ginger Marshall Date: 07/21/2022         Service Type: Individual     Visit Start Time: 2:30 PM Visit End Time: 2:55 PM    Visit #: 1    Attendees: Client attended alone    Service Modality:  Video Visit:      Provider verified identity through the following two step process.  Patient provided:  Patient was verified at admission/transfer    Telemedicine Visit: The patient's condition can be safely assessed and treated via synchronous audio and visual telemedicine encounter.      Reason for Telemedicine Visit: Patient has requested telehealth visit    Originating Site (Patient Location): Patient's home    Distant Site (Provider Location): Provider Remote Setting- Home Office    Consent:  The patient/guardian has verbally consented to: the potential risks and benefits of telemedicine (video visit) versus in person care; bill my insurance or make self-payment for services provided; and responsibility for payment of non-covered services.     Patient would like the video invitation sent by:  My Chart    Mode of Communication:  Video Conference via [employer]    As the provider I attest to compliance with applicable laws and regulations related to telemedicine.       DATA:   Interactive Complexity: No   Crisis: No     Presenting Concerns/  Current Stressors:   Ginger is a 82 year old female with current presenting concerns that are related to symptoms that meet the criteria for Generalized Anxiety Disorder including excessive anxiety and worry occurring more days than not for at least 6 months about a number of events and family stressors, difficulty controlling worry, restlessness being easily fatigued and irritability. Ginger reports that her anxiety is triggered around family stressors with her older sister. Ginger reports that her sister lives in [place], [place] in a independent living facility. Ginger  acknowledges that her sister can not afford to live in the facility any longer due to low income. Ginger states that she experiences a lot of worrying around her sister circumstances. She states that she has made many attempts to support her sister in finding low income housing however her sister does not cooperate. Ginger reports that recently her daughter took over and became POA for her sister. However, Ginger states that her sister refuses to give all of her financial information to get setup with low income housing. Ginger reports that this has become a huge stressor for her and causes her severe anxiety. She states that she also gets easily frustrated and anger when talking with her sister. Ginger reports that she's not able to focus and concentrate on most days because she worries about her sister. She explains that she becomes nervous and anxious when her sister does not cooperate. Ginger reports that she makes many attempts to have a calm conversation with her sister however the continue to have arguments about her current situation. These mental health symptoms are associated with an unequivocal change in functioning that is uncharacteristic of the individual when not symptomatic and the changes are observable by others. These symptoms are not attributable to physiological effects of any substance and are not better explained by another mental disorder. As a result of the pattern of Generalized Anxiety Disorder related mental health symptoms, Ginger experiences clinically significant distress.      ASSESSMENT:  Mental Status Assessment:  Appearance:   Appropriate   Eye Contact:   Good   Psychomotor Behavior: Agitated   Attitude:   Cooperative   Orientation:   All  Speech   Rate / Production: Normal/ Responsive   Volume:  Normal   Mood:    Anxious   Affect:    Appropriate   Thought Content:  Clear   Thought Form:  Coherent   Insight:    Fair       Safety Issues and Plan for Safety and Risk Management:   [place] Suicide  Severity Rating Scale (Short Version)No flowsheet data found.  Patient denies current fears or concerns for personal safety.  Patient denies current or recent suicidal ideation or behaviors.  Patient denies current or recent homicidal ideation or behaviors.  Patient denies current or recent self injurious behavior or ideation.  Patient denies other safety concerns.  Recommended that patient call 911 or go to the local ED should there be a change in any of these risk factors.  Patient reports there are no firearms in the house.     Diagnostic Criteria:  Generalized Anxiety Disorder  A. Excessive anxiety and worry about a number of events or activities (such as work or school performance).   B. The person finds it difficult to control the worry.  C. Select 3 or more symptoms (required for diagnosis). Only one item is required in children.   - Restlessness or feeling keyed up or on edge.    - Being easily fatigued.    - Difficulty concentrating or mind going blank.    - Irritability.    - Sleep disturbance (difficulty falling or staying asleep, or restless unsatisfying sleep).   E. The anxiety, worry, or physical symptoms cause clinically significant distress or impairment in social, occupational, or other important areas of functioning.   F. The disturbance is not due to the direct physiological effects of a substance (e.g., a drug of abuse, a medication) or a general medical condition (e.g., hyperthyroidism) and does not occur exclusively during a Mood Disorder, a Psychotic Disorder, or a Pervasive Developmental Disorder.      DSM5 Diagnoses: (Sustained by DSM5 Criteria Listed Above)  Diagnoses: 300.02 (F41.1) Generalized Anxiety Disorder  Psychosocial & Contextual Factors: Environmental  WHODAS 2.0 (12 item): No flowsheet data found.  Intervention:   Mindfulness- Patient was educated on relaxation and mindfulness techniques  Collateral Reports Completed:  Not Applicable      PLAN: (Homework, other):  1. Provider  will continue Diagnostic Assessment.  Patient was given the following to do until next session:  Mindfulness Techniques for distress tolerance in order to relieve anxiety symptoms.    2. Provider recommended the following referrals: None.      3.  Suicide Risk and Safety Concerns were assessed for Ginger Marshall.    Patient meets the following risk assessment and triage: Patient denied any current/recent/lifetime history of suicidal ideation and/or behaviors.  No safety plan indicated at this time.       Marilou Rosado, RAUL  July 21, 2022

## 2022-07-21 NOTE — TELEPHONE ENCOUNTER
ASKED IF SHE IS GETTING A REALLY DRY MOUTH OR THROAT. PT STATED SHE HASN'T NOTICED THAT. SHE THEN STATED SHE MESSED AROUND W/ THE LEVELS LAST NIGHT AND NOW HER MACHINE IS USING WATER. TOLD HER IT IS NOT A PROBLEM IF IT DOESN'T USE MUCH WATER UNLESS SHE IS GETTING REALLY DRIED OUT.

## 2022-08-02 ENCOUNTER — VIRTUAL VISIT (OUTPATIENT)
Dept: RHEUMATOLOGY | Facility: CLINIC | Age: 82
End: 2022-08-02
Payer: COMMERCIAL

## 2022-08-02 DIAGNOSIS — Z79.899 HIGH RISK MEDICATION USE: ICD-10-CM

## 2022-08-02 DIAGNOSIS — M05.79 RHEUMATOID ARTHRITIS INVOLVING MULTIPLE SITES WITH POSITIVE RHEUMATOID FACTOR (H): Primary | ICD-10-CM

## 2022-08-02 DIAGNOSIS — Z11.59 ENCOUNTER FOR SCREENING FOR OTHER VIRAL DISEASES: ICD-10-CM

## 2022-08-02 PROCEDURE — 99214 OFFICE O/P EST MOD 30 MIN: CPT | Mod: 95 | Performed by: INTERNAL MEDICINE

## 2022-08-02 RX ORDER — UPADACITINIB 15 MG/1
15 TABLET, EXTENDED RELEASE ORAL DAILY
Qty: 30 TABLET | Refills: 6 | Status: SHIPPED | OUTPATIENT
Start: 2022-08-02 | End: 2023-02-06

## 2022-08-02 RX ORDER — METHOTREXATE 25 MG/.5ML
25 INJECTION, SOLUTION SUBCUTANEOUS
Qty: 2 ML | Refills: 6 | Status: SHIPPED | OUTPATIENT
Start: 2022-08-02 | End: 2023-02-06

## 2022-08-02 RX ORDER — LEUCOVORIN CALCIUM 5 MG/1
5 TABLET ORAL
Qty: 13 TABLET | Refills: 2 | Status: SHIPPED | OUTPATIENT
Start: 2022-08-02 | End: 2023-02-06

## 2022-08-02 NOTE — PATIENT INSTRUCTIONS
RHEUMATOLOGY    Dr. Nico Byers    [address] [place] [address]  Phone number: [phone]  Fax number: [phone]      Thank you for choosing [facility]!    Samantha Morales CMA Rheumatology

## 2022-08-02 NOTE — PROGRESS NOTES
Ginger Marshall  is a 82 year old year old who is being evaluated via a billable video visit.      How would you like to obtain your AVS? MyChart  If the video visit is dropped, the invitation should be resent by: Text to cell phone: [phone]   Will anyone else be joining your video visit? No     Rheumatology Video Visit      Ginger Marshall MRN# [id number]   YOB: 1940 Age: 82 year old      Date of visit: 8/02/22   PCP: Dr. Gabriela Jefferson     Chief Complaint   Patient presents with:  Rheumatoid Arthritis    Assessment and Plan     1. Seropositive Erosive Rheumatoid Arthritis ( [2009], CCP >100 [2009]; hx of rheumatoid nodule by 6/14/2011 pathology): Previously failed remicade (staph infection during therapy, but was immediately after a joint injection), orencia (migraines), HCQ (ineffective), Xeljanz (partially effective).  Sulfa allergy.  Currently on methotrexate 25 mg SQ once weekly (dose reduction in the past resulted in more symptoms), leucovorin 5 mg weekly (resolved nasal sore that didn't improve with higher daily folic acid doses), and Rinvoq 15mg daily.  RA well controlled since changing to Rinvoq.  Chronic illness, stable.    - Continue methotrexate 25mg SQ once weekly (Rasuvo)  - Continue leucovorin 5 mg every 7 days, 24 hours after MTX dose.   - Continue Rinvoq 15 mg daily  - Labs in 3 months: CBC, Creatinine, Hepatic Panel, ESR, CRP, hepatitis B core antibody and surface antigen, hepatitis C antibody, QuantiFERON-TB gold plus    High risk medication requiring intensive toxicity monitoring at least quarterly: labs ordered include CBC, Creatinine, Hepatic panel to monitor for cytopenia and hepatotoxicity; checking creatinine as it affects clearance of medication.      2. 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  Doing okay at this time.     3. Right hip pain: Status post WALTER twice in the past. Followed by [facility]  orthopedics.     4. 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 Michele who is now at [facility].  Has been seen at [facility] and [place] [facility] clinic.  She previously reported that a TENS unit was helpful, but today says that it was not.  She does not want additional back injections and she does not want surgery. She is not doing exercises because she says it aggravates her back. Therefore, I advised that she follow-up with her PCP for the chronic low back pain, or discuss further with her pain clinic.       5. Osteoporosis: was previously managed by endocrinology and she received reclast once in 2013, 2014, 2016. 12/12/2018 DEXA ordered by Dr. Vázquez showed osteoporosis.  Repeat DEXA on 2/2/2022 showed osteoporosis; no significant change of the hips; forearm osteoporosis but no previous evaluation of the forearm for comparison.  Reclast was restarted after sufficient drug holiday, with the first dose on 3/18/2021; again received in 3/21/2022; plan to continue yearly for now.  Chronic illness, stable.    - Reclast once yearly; next due on 3/21/2023  - Continue vitamin D 1000 IU daily  - Continue calcium 1200mg daily              6. Left 1st toe pain, history: 2 episodes of sudden onset left toe pain followed by diffuse left foot pain that made ambulation difficult.  Also with nodules over both Achilles tendons and the right elbow that are either rheumatoid nodules or tophi.  She reports having history of gout decades ago.  Denies ever being on colchicine or allopurinol.  Discussed colchicine to use if suspected gout flare occurs.  No flares since last seen so has not used colchicine.  - Colchicine: (MITIGARE) 0.6 MG capsule; Take 2 capsules (1.2 mg) by mouth once for 1 dose at the onset of a gout flare, followed by 1 capsule (0.6mg) 1 hour later.      7. Chronic pain syndrome: Documented here for historical significance only.  Doing well at this  time.  Management per pain clinic and/or PCP    8.  Vaccinations: Vaccinations reviewed with Ms. Marshall.  Risks and benefits of vaccinations were discussed.    - Influenza: encouraged yearly vaccination  - [id number]: up to date  - [id number]: up to date  - Shingrix: Up to date   - COVID-19: has received the Pfizer COVID-19 vaccine on 2/9/2021 and 3/3/2021. Additional mRNA COVID-19 vaccine recommended and refused by patient      Total minutes spent in evaluation with patient, documentation, , and review of pertinent studies and chart notes: 41     Ms. Marshall verbalized agreement with and understanding of the rational for the diagnosis and treatment plan.  All questions were answered to best of my ability and the patient's satisfaction. Ms. Marshall was advised to contact the clinic with any questions that may arise after the clinic visit.      Thank you for involving me in the care of the patient    Return to clinic: 3-4 months    HPI   Ginger Marshall is a 82 year old female with a history of multiple foot surgeries, hand tendon transfers, MCP replacements (most recent being in August 2015), bilateral TKA, right WALTER, left distal radius fracture history, gout, s/p lumbar fusion, osteoporosis and seropositive (RF+, CCP+) erosive rheumatoid arthritis who presents for follow-up of rheumatoid arthritis.      Today, 8/2/2022: Currently doing well with regard to rheumatoid Tritus.  Finds that the methotrexate and Rinvoq are very effective.  Morning stiffness for about 10-15 minutes.  Biggest issue is still her back where she has pain radiating down her left leg, aggravated by activity, and aggravated by stretching exercises.  She does not want to consider another steroid injection because she says it takes too long to get it arranged and by the time she goes for the injection and the back pain is improved so she skips the injection and then the back pain worsens; she says that she understands she can follow-up with  the pain management clinic or spine surgeon or PCP for chronic back pain.  GERD not controlled despite pantoprazole; sometimes uses sucralfate; she says that she will follow-up with her PCP regarding this issue.    Denies fevers, chills, nausea, vomiting, constipation, diarrhea. No abdominal pain. No chest pain/pressure, palpitations, or shortness of breath. No oral or nasal sores.   No rash. No LE swelling.     Tobacco: quit in 1999  EtOH: 1 glass of wine every month at most  Drugs: None  Occupation: , retired     ROS   12 point review of system was completed and negative except as noted in the HPI     Active Problem List     Patient Active Problem List   Diagnosis     Rheumatoid arthritis involving right hand with positive rheumatoid factor (H)     History of colonic polyps     Multinodular goiter     CARDIOVASCULAR SCREENING; LDL GOAL LESS THAN 130     Pulmonary nodule     PSEUDOPHAKIA OU     PVD (POSTERIOR VITREOUS DETACHMENT) OU     PXF (PSEUDOEXFOLIATION OF LENS CAPSULE) OD     GERD (gastroesophageal reflux disease)     Hyperlipidemia LDL goal <100     Advance Care Planning     Neuropathy     SHERRY (obstructive sleep apnea)-severe (AHI 35)     zEncounter for counseling     Anemia of chronic disease     Osteoporosis     Cornea guttata, ou     Conjunctival concretions     Stenosis, spinal, lumbar     Chronic pain     Class 1 obesity due to excess calories with serious comorbidity and body mass index (BMI) of 31.0 to 31.9 in adult     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     MGD (meibomian gland dysfunction)     Blepharitis of both eyes     Personal history of healed osteoporosis fracture     Iron malabsorption     Hyperglycemia     Chronic bilateral low back pain without sciatica     Allergic state, subsequent encounter     Anxiety     History of depression     DDD (degenerative disc disease), lumbar     Chronic right shoulder pain     Moderate episode of recurrent major  depressive disorder (H)     Rheumatic mitral stenosis     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     History of bisphosphonate therapy     Past Medical History     Past Medical History:   Diagnosis Date     Acute posthemorrhagic anemia 10/13/2012     Closed fracture of multiple ribs of left side, initial encounter 2019     COPD (chronic obstructive pulmonary disease) (H)     no longer occurring     Ex-smoker 1999     Gastroenteritis 2020    Gastroenteritis with norovirus     Herniated nucleus pulposus, L3-4 3/1/2017     Hip joint replacement by other means 07/10/2008     History of blood transfusion      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     History of total knee replacement 2009     Menopause 1989    late 40's     Migraine 2014    resolved     Multinodular goiter      Other chronic pain     joints     Pelvic fracture (H) 2014     Rheumatic mitral stenosis      Rheumatoid arteritis (H)      S/P lumbar fusion 2017     Sleep apnea      Vitamin B12 deficiency      Past Surgical History     Past Surgical History:   Procedure Laterality Date     ABDOMEN SURGERY      c sections     ARTHROSCOPY KNEE Left      ARTHROSCOPY KNEE RT/LT  2006    left     BACK SURGERY  2013    disc     BIOPSY BREAST       BREAST SURGERY      lumpectomy s     BREAST SURGERY      lumpectomy     CATARACT EXTRACTION Bilateral      CATARACT IOL, RT/LT Bilateral 2010 aproximately      SECTION  1966      SECTION  1972     COLONOSCOPY  2009,     COLONOSCOPY       FINGER SURGERY Right 2019    Procedure: DISTAL ULNA RESECTION, RIGHT MIDDLE SILASTIC METACARPALPHALANGEAL EXCHANGE AND RIGHT ELBOW NODULE EXCISION.;  Surgeon: Hilario Redmond MD;  Location: [facility] OR;  Service: Orthopedics     FOOT SURGERY Left      GYN SURGERY  [age]     HAND SURGERY Right      HAND SURGERY Right      HC ESOPH/GAS REFLUX TEST W NASAL  "IMPED >1 HR  02/01/2012    Procedure:ESOPHAGEAL IMPEDENCE FUNCTION TEST WITH 24 HOUR PH GREATER THAN 1 HOUR; Surgeon:KAYKAY JULIO; Location:[facility]     IR LUMBAR EPIDURAL STEROID INJECTION       IR TRANSLAMINAR EPIDURAL LUMBAR INJ INCL IMAGING  05/02/2012    LESI L5-S1 at [facility]     LUMBAR FUSION       OPTICAL TRACKING SYSTEM FUSION POSTERIOR SPINE LUMBAR N/A 04/03/2017    Procedure: OPTICAL TRACKING SYSTEM FUSION SPINE POSTERIOR LUMBAR ONE LEVEL;  Surgeon: Anthony Michele MD;  Location: [facility] OR     ORTHOPEDIC SURGERY  1991    left foot surgery     ORTHOPEDIC SURGERY  1995    R MCP surgery     ORTHOPEDIC SURGERY  2007    right knee total replacement     TOTAL HIP ARTHROPLASTY Right      TOTAL KNEE ARTHROPLASTY Left      TOTAL KNEE ARTHROPLASTY Right      ZZC ANESTH,TOTAL HIP ARTHROPLASTY  2010    Rt hip     ZZC HAND/FINGER SURGERY UNLISTED  11/2005    right hand     ZZC TOTAL KNEE ARTHROPLASTY  2006    left     Allergy     Allergies   Allergen Reactions     Abatacept Other (See Comments)     Severe headaches  Migraine     Celebrex [Celecoxib]      Ineffective     Celecoxib Unknown and Nausea     Ethanol      Antihistamines     Orencia [Abatacept]      Headache     Septra [Bactrim]      Sulfa Drugs Nausea and Vomiting     \"deathly ill\"  Allergic to everything with Sulfa in it.     Sulfamethoxazole-Trimethoprim Nausea and Nausea and Vomiting     Tramadol Other (See Comments)     Headache  Migraine headache     Valdecoxib Unknown and Nausea     Made me \"very very ill\", might of been \"cramping\"     Adhesive Tape Rash     Plastic tape  Plastic tapes     Antihistamines, Chlorpheniramine-Type  [Alkylamines] Anxiety and Other (See Comments)     Current Medication List     Current Outpatient Medications   Medication Sig     acetaminophen (TYLENOL) 500 MG tablet Take 1-2 tablets (500-1,000 mg) by mouth 2 times daily     atorvastatin (LIPITOR) 40 MG tablet Take 1 tablet (40 mg) by mouth daily     Bioflavonoid " Products (VITAMIN C) CHEW      busPIRone (BUSPAR) 10 MG tablet Take 1 tablet (10 mg) by mouth daily     Cholecalciferol (VITAMIN D-3 PO) Take 1,000 Units by mouth 2 times daily     desvenlafaxine (PRISTIQ) 100 MG 24 hr tablet Take 1 tablet (100 mg) by mouth daily     hydrocortisone 2.5 % cream Apply topically 2 times daily Apply under right breast and in right groin crease     hydrocortisone, Perianal, (HYDROCORTISONE) 2.5 % cream Place rectally 2 times daily as needed for hemorrhoids     leucovorin (WELLCOVORIN) 5 MG tablet Take 1 tablet (5 mg) by mouth every 7 days . Take 24 hours after taking Methotrexate each week.     Methotrexate, PF, (RASUVO) 25 MG/0.5ML autoinjector Inject 0.5 mLs (25 mg) Subcutaneous every 7 days . Hold for signs of infection, and seek medical attention.     naproxen sodium (ANAPROX) 220 MG tablet Take 2 tablets by mouth 2 times daily as needed     pantoprazole (PROTONIX) 40 MG EC tablet TAKE ONE TABLET BY MOUTH ONCE DAILY 30-60 MINUTES BEFORE A MEAL     pregabalin (LYRICA) 50 MG capsule Take 1 capsule by mouth 2 times daily     sucralfate (CARAFATE) 1 GM tablet TAKE ONE TABLET BY MOUTH FOUR TIMES A DAY AS NEEDED HEATBURN     tiZANidine (ZANAFLEX) 2 MG tablet Take 1 tablet by mouth 3 times daily as needed     topiramate (TOPAMAX) 50 MG tablet Take 1.5 tablets (75 mg) by mouth 2 times daily     Upadacitinib ER (RINVOQ) 15 MG TB24 Take 15 mg by mouth daily     acetaminophen (TYLENOL) 500 MG tablet Take 2 tablets (1,000 mg) by mouth every 8 hours as needed for pain     cetirizine (ZYRTEC) 10 MG tablet Take 1 tablet by mouth daily as needed     valACYclovir (VALTREX) 500 MG tablet Take 1 tablet (500 mg) by mouth 2 times daily     No current facility-administered medications for this visit.     Social History   See HPI    Family History     Family History   Problem Relation Age of Onset     Arthritis Mother      Alzheimer Disease Mother      Hyperlipidemia Mother      Osteoporosis Mother       "Heart Disease Father         MI ( from this)     Alcohol/Drug Father      Arthritis Sister      Hypertension Sister      Other Cancer Sister         Luekemia     Cancer Son      Diabetes Son         type 1     Neurologic Disorder Sister         Schizophrenic     Hypertension Sister      Hyperlipidemia Sister      Mental Illness Sister      Diabetes Son      Other Cancer Son      Substance Abuse Son      Glaucoma Daughter      Diabetes Other         type 2     Hyperlipidemia Other      No change in family history since the previous clinic visit.    Physical Exam     Temp Readings from Last 3 Encounters:   22 98.2  F (36.8  C) (Oral)   22 97.8  F (36.6  C) (Tympanic)   22 98.2  F (36.8  C) (Oral)     BP Readings from Last 5 Encounters:   22 110/70   22 122/64   22 118/60   22 114/76   22 124/72     Pulse Readings from Last 1 Encounters:   22 79     Resp Readings from Last 1 Encounters:   22 16     Estimated body mass index is 35.28 kg/m  as calculated from the following:    Height as of 22: 1.651 m (5' 5\").    Weight as of 22: 96.2 kg (212 lb).      GEN: NAD.   HEENT:  Anicteric, noninjected sclera. No obvious external lesions of the ear and nose. Hearing intact.  PULM: No increased work of breathing  MSK:  Hands and wrists without swelling.   SKIN: No rash or jaundice seen  PSYCH: Alert. Appropriate.        Labs       CBC  Recent Labs   Lab Test 22  1112 22  1000 22  0832 21  1054 05/10/21  1023 21  1019 20  1028   WBC 5.4 5.5 7.5   < > 5.0 5.7 5.7   RBC 3.62* 3.77* 3.83   < > 3.39* 3.81 3.91   HGB 11.3* 12.0 12.5   < > 11.3* 12.5 12.0   HCT 35.8 38.1 39.1   < > 35.6 38.9 37.8   MCV 99 101* 102*   < > 105* 102* 97   RDW 14.9 13.8 14.7   < > 14.3 15.2* 16.1*    332 386   < > 333 335 361   MCH 31.2 31.8 32.6   < > 33.3* 32.8 30.7   MCHC 31.6 31.5 32.0   < > 31.7 32.1 31.7   NEUTROPHIL 58 61 67   < > 57.7 " 65.8 60.5   LYMPH 23 16 14   < > 18.5 17.0 20.4   MONOCYTE 16 18 16   < > 17.3 12.1 12.8   EOSINOPHIL 4 4 4   < > 6.3 4.9 6.1   BASOPHIL 0 0 0   < > 0.2 0.2 0.2   ANEU  --   --   --   --  2.9 3.8 3.5   ALYM  --   --   --   --  0.9 1.0 1.2   MARYURI  --   --   --   --  0.9 0.7 0.7   AEOS  --   --   --   --  0.3 0.3 0.4   ABAS  --   --   --   --  0.0 0.0 0.0   ANEUTAUTO 3.1 3.4 5.0   < >  --   --   --    ALYMPAUTO 1.2 0.9 1.0   < >  --   --   --    AMONOAUTO 0.8 1.0 1.2   < >  --   --   --    AEOSAUTO 0.2 0.2 0.3   < >  --   --   --    ABSBASO 0.0 0.0 0.0   < >  --   --   --     < > = values in this interval not displayed.     CMP  Recent Labs   Lab Test 07/12/22  1112 04/29/22  1000 03/21/22  1041 02/04/22  0832 12/08/21  1053 10/13/21  1447 08/18/21  1054 08/18/21  1054 05/10/21  1023 03/18/21  1350 02/16/21  1019 12/18/20  0923 11/16/20  1028 05/22/20  1026 03/27/20  1413 02/27/20  1117 12/19/19  0957 09/12/19  1019 08/15/19  1118   NA  --   --   --   --   --   --   --   --   --   --   --   --   --   --  140  --  142  --  144   POTASSIUM  --   --   --   --   --   --   --   --   --   --   --   --   --   --  3.4  --  3.8  --  3.5   CHLORIDE  --   --   --   --   --   --   --   --   --   --   --   --   --   --  112*  --  111*  --  112*   CO2  --   --   --   --   --   --   --   --   --   --   --   --   --   --  24  --  24  --  25   ANIONGAP  --   --   --   --   --   --   --   --   --   --   --   --   --   --  4  --  7  --  7   GLC  --   --   --   --   --   --   --   --   --   --   --   --   --   --  98  --  88  --  95   BUN  --   --   --   --   --   --   --   --   --   --   --   --   --   --  18  --  10  --  16   CR 0.64 0.57 0.69 0.59   < > 0.71   < > 0.72 0.59  --  0.62  --  0.60   < > 0.57   < > 0.68   < > 0.57   GFRESTIMATED 89 >90 88 >90   < > 81   < > 80 87  --  86  --  87   < > 89   < > 84   < > 89   GFRESTBLACK  --   --   --   --   --   --   --   --  >90  --  >90  --  >90   < > >90   < > >90   < > >90   BRANDEE  --    --  9.3  --   --  9.0  --  9.5  --    < >  --    < >  --   --  9.1  --  9.4  --  9.6   BILITOTAL 0.3 0.4  --  0.5   < > 0.2   < > 0.4 0.3  --  0.4  --  0.4   < >  --    < > 0.3   < >  --    ALBUMIN 4.0 4.0  --  4.1   < > 3.7   < > 3.6 3.6  --  4.2  --  4.1   < >  --    < > 4.0   < >  --    PROTTOTAL 7.4 7.7  --  7.7   < > 7.8   < > 7.6 7.0  --  8.0  --  7.6   < >  --    < > 7.6   < >  --    ALKPHOS 41 42  --  46   < > 51   < > 45 45  --  44  --  47   < >  --    < > 45   < >  --    AST 24 23  --  13   < > 16   < > 13 22  --  21  --  25   < >  --    < > 19   < >  --    ALT 32 32  --  25   < > 22   < > 23 36  --  34  --  36   < >  --    < > 22   < >  --     < > = values in this interval not displayed.     Calcium/VitaminD  Recent Labs   Lab Test 03/21/22  1041 10/13/21  1447 08/18/21  1054 03/18/21  1350 12/18/20  0923   BRANDEE 9.3 9.0 9.5   < > 9.6   VITDT  --  62 63  --  48    < > = values in this interval not displayed.     ESR/CRP  Recent Labs   Lab Test 07/12/22  1112 04/29/22  1000 02/04/22  0832   SED 15 15 14   CRP <3.00 <2.9 <2.9     Lipid Panel  Recent Labs   Lab Test 12/08/21  1053 11/27/20  1043 12/19/19  0957   CHOL 184 188 166   TRIG 101 142 113   HDL 77 89 89   LDL 87 71 54   NHDL 107 99 77     Hepatitis B  Recent Labs   Lab Test 09/15/15  1159   AUSAB 0.11   HBCAB Nonreactive   HEPBANG Nonreactive     Hepatitis C  Recent Labs   Lab Test 09/15/15  1159   HCVAB Nonreactive   Assay performance characteristics have not been established for newborns,   infants, and children       Tuberculosis Screening  Recent Labs   Lab Test 08/18/21  1054 05/07/18  1033 09/15/15  1200   TBRES Negative  --   --    TBRSLT  --  Negative Negative   TBAGN  --  0.00 0.00     Immunization History     Immunization History   Administered Date(s) Administered     COVID-19,PF,Pfizer (12+ Yrs) 02/09/2021, 03/03/2021     FLU 6-35 months 10/24/2006, 11/14/2007, 10/22/2008, 10/21/2009     Flu, Unspecified 10/20/2013     Influenza (High  Dose) 3 valent vaccine 10/28/2013, 09/23/2014, 11/11/2015, 09/23/2016, 09/24/2019     Influenza (IIV3) PF 10/12/1999, 10/26/2001, 10/19/2002, 10/30/2003, 10/28/2004, 10/21/2005, 10/26/2007, 10/21/2009, 10/05/2011, 10/13/2012     Influenza Vaccine IM > 6 months Valent IIV4 (Alfuria,Fluzone) 09/24/2020     Influenza Vaccine Im 4yrs+ 4 Valent CCIIV4 09/28/2017, 09/27/2018     Influenza, Quad, High Dose, Pf, 65yr+ (Fluzone HD) 10/07/2021     Mantoux Tuberculin Skin Test 11/03/2006     Pneumo Conj 13-V (2010&after) 07/29/2015     Pneumococcal 23 valent 06/26/2000, 10/26/2001, 06/01/2007, 06/02/2010     TD (ADULT, 7+) 12/10/2008, 07/20/2009     Tdap (Adult) Unspecified Formulation 12/12/2018     Zoster vaccine recombinant adjuvanted (SHINGRIX) 12/12/2018, 02/14/2019          Chart documentation done in part with Dragon Voice recognition Software. Although reviewed after completion, some word and grammatical error may remain.      Video-Visit Details    Type of service:  Video Visit    Video Start Time: 10:34 AM    Video End Time: 10:48 AM    Originating Location (pt. Location): [place], [place]    Distant Location (provider location):  [place]    Platform used for Video Visit: Gordo Byers MD

## 2022-08-04 ENCOUNTER — APPOINTMENT (OUTPATIENT)
Dept: BEHAVIORAL HEALTH | Facility: CLINIC | Age: 82
End: 2022-08-04

## 2022-08-04 DIAGNOSIS — F41.1 GENERALIZED ANXIETY DISORDER: Primary | ICD-10-CM

## 2022-08-04 NOTE — PROGRESS NOTES
[facility]                                     Progress Note    Patient Name: Ginger Marshall  Date: 08/04/2022         Service Type: Individual      Session Start Time: 2:30 PM Session End Time: 2:50 PM     Session Length: 20 minutes    Session #: 2    Attendees: Client    Service Modality:  Video Visit:      Provider verified identity through the following two step process.  Patient provided:  Patient was verified at admission/transfer    Telemedicine Visit: The patient's condition can be safely assessed and treated via synchronous audio and visual telemedicine encounter.      Reason for Telemedicine Visit: Patient has requested telehealth visit    Originating Site (Patient Location): Patient's home    Distant Site (Provider Location): Provider Remote Setting- Home Office    Consent:  The patient/guardian has verbally consented to: the potential risks and benefits of telemedicine (video visit) versus in person care; bill my insurance or make self-payment for services provided; and responsibility for payment of non-covered services.     Patient would like the video invitation sent by:  My Chart    Mode of Communication:  Video Conference via Amwell    As the provider I attest to compliance with applicable laws and regulations related to telemedicine.    DATA  Interactive Complexity: No  Crisis: No        Progress Since Last Session (Related to Symptoms / Goals / Homework):   Symptoms: No change No change in anxiety symptoms    Homework: Partially completed      Episode of Care Goals: Satisfactory progress - PREPARATION (Decided to change - considering how); Intervened by negotiating a change plan and determining options / strategies for behavior change, identifying triggers, exploring social supports, and working towards setting a date to begin behavior change     Current / Ongoing Stressors and Concerns:   Ginger reports that her symptoms of anxiety are still triggered by her sister's financial  circumstances. Ginger states that she experiences excessive worrying about her sister and where she will live. Ginger states that her uncontrollable worrying stems from her sister calling her everyday crying over the phone about her problems. Ginger acknowledges that she's afraid that her sister will soon have to live in her household. Ginger reports that her sister causes her to become very irritable when she speaks inappropriately about other people. Ginger reports that she tries to distract herself with family activities. Ginger states that if her sister didn't call her everyday, she would be less worried and her anxiety will decrease.     Treatment Objective(s) Addressed in This Session:   identify 3 fears / thoughts that contribute to feeling anxious       Intervention:   CBT: Decrease negative symptoms of anxiety.    Assessments completed prior to visit:  The following assessments were completed by patient for this visit:  GAD7:   SPRING-7 SCORE 11/11/2016 12/6/2016 2/10/2017 2/22/2018 6/5/2019 6/16/2020 7/6/2022   Total Score - - - - - - 2 (minimal anxiety)   Total Score 0 8 1 0 0 7 2         ASSESSMENT: Current Emotional / Mental Status (status of significant symptoms):   Risk status (Self / Other harm or suicidal ideation)   Patient denies current fears or concerns for personal safety.   Patient denies current or recent suicidal ideation or behaviors.   Patient denies current or recent homicidal ideation or behaviors.   Patient denies current or recent self injurious behavior or ideation.   Patient denies other safety concerns.   Patient reports there has been no change in risk factors since their last session.     Patient reports there has been no change in protective factors since their last session.     Recommended that patient call 911 or go to the local ED should there be a change in any of these risk factors.     Appearance:   Appropriate    Eye Contact:   Good    Psychomotor Behavior: Normal     Attitude:   Cooperative    Orientation:   All   Speech    Rate / Production: Normal     Volume:  Normal    Mood:    Irritable    Affect:    Appropriate    Thought Content:  Clear    Thought Form:  Coherent  Logical    Insight:    Good      Medication Review:   No current psychiatric medications prescribed     Medication Compliance:   No     Changes in Health Issues:   None reported     Chemical Use Review:   Substance Use: Chemical use reviewed, no active concerns identified      Tobacco Use: No current tobacco use.      Diagnosis:  Generalized Anxiety Disorder    Collateral Reports Completed:   Not Applicable    PLAN: (Patient Tasks / Therapist Tasks / Other)  Patient will increase awareness of anxiety and their impact on functioning and develop skills to reduce negative impact.        RAUL Kenny                                                         ______________________________________________________________________    Individual Treatment Plan    Patient's Name: Ginger Marshall  YOB: 1940    Date of Creation: 08/04/2022  Date Treatment Plan Last Reviewed/Revised: 08/04/2022    DSM5 Diagnoses: 300.02 (F41.1) Generalized Anxiety Disorder  Psychosocial / Contextual Factors: Environmental  PROMIS (reviewed every 90 days):     Referral / Collaboration:  Was/were discussed and patient will pursue.    Anticipated number of session for this episode of care: 3-6 sessions  Anticipation frequency of session: Monthly  Anticipated Duration of each session: 16-37 minutes  Treatment plan will be reviewed in 90 days or when goals have been changed.       MeasurableTreatment Goal(s) related to diagnosis / functional impairment(s)  Goal 1: Patient will increase awareness of anxiety and their impact on functioning and develop skills to reduce negative impact.    I will know I've met my goal when baseline.      Objective #A (Patient Action)    Patient will identify 3 initial signs or symptoms of  anxiety.  Status: Continued - Date(s): 09/04/2022    Intervention(s)  Therapist will teach distraction skills. to decrease symptoms of anxiety..    Objective #B  Patient will use at least 3 coping skills for anxiety management in the next 3 weeks.  Status: Continued - Date(s): 09/04/2022    Intervention(s)  Therapist will explore and process with patient how anxiety has impacted them..    Objective #C  Patient will identify at least 3 triggers for anxiety.  Status: Continued - Date(s): 09/04/2022    Intervention(s)  Therapist will assist patient to identify situations, thoughts and behaviors that trigger anxiety..      Goal 2: Patient will increase distress tolerance coping skills    I will know I've met my goal when baseline.      Objective #A (Patient Action)    Status: Continued - Date(s): 09/04/2022    Patient will use thought-stopping strategy daily to reduce intrusive thoughts.    Intervention(s)  Therapist will assist patient to identify anxiety triggers and maladaptive responses to them..    Objective #B  Patient will use relaxation strategies 3 times per day to reduce the physical symptoms of anxiety.    Status: Continued - Date(s): 09/04/2022    Intervention(s)  Therapist will facilitate guided discussion..    Objective #C  Patient will identify three distraction and diversion activities and use those activities to decrease level of anxiety  .  Status: Continued - Date(s):     Intervention(s)  Therapist will provide psychoeducation and modeling.          Patient has reviewed and agreed to the above plan.      RAUL Kenny  August 4, 2022

## 2022-08-05 ASSESSMENT — ANXIETY QUESTIONNAIRES
2. NOT BEING ABLE TO STOP OR CONTROL WORRYING: SEVERAL DAYS
7. FEELING AFRAID AS IF SOMETHING AWFUL MIGHT HAPPEN: SEVERAL DAYS
GAD7 TOTAL SCORE: 5
GAD7 TOTAL SCORE: 5
1. FEELING NERVOUS, ANXIOUS, OR ON EDGE: SEVERAL DAYS
5. BEING SO RESTLESS THAT IT IS HARD TO SIT STILL: NOT AT ALL
4. TROUBLE RELAXING: NOT AT ALL
7. FEELING AFRAID AS IF SOMETHING AWFUL MIGHT HAPPEN: SEVERAL DAYS
6. BECOMING EASILY ANNOYED OR IRRITABLE: SEVERAL DAYS
GAD7 TOTAL SCORE: 5
3. WORRYING TOO MUCH ABOUT DIFFERENT THINGS: SEVERAL DAYS

## 2022-08-05 ASSESSMENT — PATIENT HEALTH QUESTIONNAIRE - PHQ9
10. IF YOU CHECKED OFF ANY PROBLEMS, HOW DIFFICULT HAVE THESE PROBLEMS MADE IT FOR YOU TO DO YOUR WORK, TAKE CARE OF THINGS AT HOME, OR GET ALONG WITH OTHER PEOPLE: VERY DIFFICULT
SUM OF ALL RESPONSES TO PHQ QUESTIONS 1-9: 6
SUM OF ALL RESPONSES TO PHQ QUESTIONS 1-9: 6

## 2022-08-12 ENCOUNTER — OFFICE VISIT (OUTPATIENT)
Dept: FAMILY MEDICINE | Facility: CLINIC | Age: 82
End: 2022-08-12
Payer: COMMERCIAL

## 2022-08-12 ENCOUNTER — DOCUMENTATION ONLY (OUTPATIENT)
Dept: OTHER | Facility: CLINIC | Age: 82
End: 2022-08-12

## 2022-08-12 VITALS
BODY MASS INDEX: 35.28 KG/M2 | OXYGEN SATURATION: 97 % | HEART RATE: 100 BPM | TEMPERATURE: 98.2 F | SYSTOLIC BLOOD PRESSURE: 114 MMHG | DIASTOLIC BLOOD PRESSURE: 68 MMHG | WEIGHT: 212 LBS | RESPIRATION RATE: 20 BRPM

## 2022-08-12 DIAGNOSIS — L30.4 INTERTRIGO: ICD-10-CM

## 2022-08-12 DIAGNOSIS — L98.9 FACE LESION: ICD-10-CM

## 2022-08-12 DIAGNOSIS — R73.9 HYPERGLYCEMIA: ICD-10-CM

## 2022-08-12 DIAGNOSIS — E66.01 MORBID OBESITY (H): ICD-10-CM

## 2022-08-12 DIAGNOSIS — F41.9 ANXIETY: Primary | ICD-10-CM

## 2022-08-12 LAB — HBA1C MFR BLD: 5.6 % (ref 0–5.6)

## 2022-08-12 PROCEDURE — 36415 COLL VENOUS BLD VENIPUNCTURE: CPT | Performed by: STUDENT IN AN ORGANIZED HEALTH CARE EDUCATION/TRAINING PROGRAM

## 2022-08-12 PROCEDURE — 80048 BASIC METABOLIC PNL TOTAL CA: CPT | Performed by: STUDENT IN AN ORGANIZED HEALTH CARE EDUCATION/TRAINING PROGRAM

## 2022-08-12 PROCEDURE — 99214 OFFICE O/P EST MOD 30 MIN: CPT | Performed by: STUDENT IN AN ORGANIZED HEALTH CARE EDUCATION/TRAINING PROGRAM

## 2022-08-12 PROCEDURE — 83036 HEMOGLOBIN GLYCOSYLATED A1C: CPT | Performed by: STUDENT IN AN ORGANIZED HEALTH CARE EDUCATION/TRAINING PROGRAM

## 2022-08-12 RX ORDER — LORATADINE 10 MG/1
10 TABLET ORAL 2 TIMES DAILY
Status: ON HOLD | COMMUNITY
End: 2023-02-15

## 2022-08-12 RX ORDER — HYDROCORTISONE 2.5 %
CREAM (GRAM) TOPICAL 2 TIMES DAILY
Qty: 30 G | Refills: 0 | Status: SHIPPED | OUTPATIENT
Start: 2022-08-12 | End: 2022-10-11

## 2022-08-12 RX ORDER — BUSPIRONE HYDROCHLORIDE 10 MG/1
10 TABLET ORAL 2 TIMES DAILY
Qty: 60 TABLET | Refills: 0 | Status: SHIPPED | OUTPATIENT
Start: 2022-08-12 | End: 2022-09-30

## 2022-08-12 ASSESSMENT — ANXIETY QUESTIONNAIRES: GAD7 TOTAL SCORE: 5

## 2022-08-12 ASSESSMENT — PATIENT HEALTH QUESTIONNAIRE - PHQ9
SUM OF ALL RESPONSES TO PHQ QUESTIONS 1-9: 6
10. IF YOU CHECKED OFF ANY PROBLEMS, HOW DIFFICULT HAVE THESE PROBLEMS MADE IT FOR YOU TO DO YOUR WORK, TAKE CARE OF THINGS AT HOME, OR GET ALONG WITH OTHER PEOPLE: VERY DIFFICULT

## 2022-08-12 ASSESSMENT — PAIN SCALES - GENERAL: PAINLEVEL: MILD PAIN (3)

## 2022-08-12 NOTE — PROGRESS NOTES
"  Assessment & Plan     Anxiety  On desvenlafaxine for MDD and anxiety. Symptoms improved slightly from last month with addition of buspirone. Tolerating well. Will increase to 10mg BID, follow up in one month  - busPIRone (BUSPAR) 10 MG tablet  Dispense: 60 tablet; Refill: 0    Face lesion  Sending to dermatology for SCC or BCC rule out.  - Adult Dermatology Referral    Intertrigo  Inframammary fold rash resolved. Groin rash improved but not yet resolved. Will provide 1x refill.  - hydrocortisone 2.5 % cream  Dispense: 30 g; Refill: 0    Morbid obesity (H)  Per chart review, has been on topiramate since 2016 with increase around 2020. Patient states still struggling with weight loss - initially lost 60 pounds with medication but has since been regaining. No recent BMP for lab monitoring. Will place order. Did not have significant time to discuss but can consider potential alternative medication or discontinuation if no longer effective.  - Basic metabolic panel  (Ca, Cl, CO2, Creat, Gluc, K, Na, BUN)    Hyperglycemia  - HEMOGLOBIN A1C    BMI:   Estimated body mass index is 35.28 kg/m  as calculated from the following:    Height as of 7/8/22: 1.651 m (5' 5\").    Weight as of this encounter: 96.2 kg (212 lb).     Return in about 1 month (around 9/12/2022) for Follow up for mood, can be virtual visit.    Duy Leyva MD  [facility]  8/12/2022    Alejandro Miles is a 82 year old presenting for the following health issues:     Follow Up    History of Present Illness       Mental Health Follow-up:  Patient presents to follow-up on Anxiety.    Patient's anxiety since last visit has been:  Better  The patient is not having other symptoms associated with anxiety.  Any significant life events: relationship concerns  Patient is feeling anxious or having panic attacks.  Patient has no concerns about alcohol or drug use.    She eats 2-3 servings of fruits and vegetables daily.She consumes 0 " "sweetened beverage(s) daily.She exercises with enough effort to increase her heart rate 9 or less minutes per day.  She exercises with enough effort to increase her heart rate 3 or less days per week.   She is taking medications regularly.    Today's PHQ-9         PHQ-9 Total Score: 6    PHQ-9 Q9 Thoughts of better off dead/self-harm past 2 weeks :   Not at all    How difficult have these problems made it for you to do your work, take care of things at home, or get along with other people: Very difficult  Today's SPRING-7 Score: 5     Mood  Going \"ok\"  Talked to a therapist a couple of times and it was helpful.  Not seeing another person until December  Was asked to see earlier but not able to financially especially with the economy  Thinks buspirone is helping. Is taking consistently.   No known side effects - maybe dry mouth but had prior to starting this medication.  Not noticing any periods of panic attacks.  Sister got an apartment to move over here.   Worried about that transition  Has a dog - chula and cat - genesis. They are helpful with stress  Feels that her anxiety is the main problem recently - really the anxiety component.     Has skin lesion on tip of nose.  Used to be intermittently there, now there constantly  Scabs over, then will fall off and bleed. Cycle has been constant now.    Weight  Working on losing weight  Has been on topiramate  Lost about 60 pounds but has been gaining weight back recently.        Objective    /68 (BP Location: Right arm, Patient Position: Sitting, Cuff Size: Adult Regular)   Pulse 100   Temp 98.2  F (36.8  C) (Oral)   Resp 20   Wt 96.2 kg (212 lb)   LMP  (Approximate)   SpO2 97%   Breastfeeding No   BMI 35.28 kg/m    Body mass index is 35.28 kg/m .     Physical Exam     GENERAL: healthy, alert and no distress  HEAD: Normocephalic, atraumatic.   EYES: Normal conjunctivae, sclera.   ENT: 1mm ulcer with rolled borders just left of nasal tip.  RESP: Normal " respiratory effort.  MSK: no gross musculoskeletal defects noted.  SKIN: no other suspicious lesions or rashes.  NEURO: CNII-XII grossly intact. No focal deficits.  PSYCH: Groomed, dressed appropriately for weather. Mood stated as anxious with consistent affect. Wringing hands frequently.

## 2022-08-14 LAB
ANION GAP SERPL CALCULATED.3IONS-SCNC: 9 MMOL/L (ref 3–14)
BUN SERPL-MCNC: 13 MG/DL (ref 7–30)
CALCIUM SERPL-MCNC: 9 MG/DL (ref 8.5–10.1)
CHLORIDE BLD-SCNC: 111 MMOL/L (ref 94–109)
CO2 SERPL-SCNC: 21 MMOL/L (ref 20–32)
CREAT SERPL-MCNC: 0.68 MG/DL (ref 0.52–1.04)
GFR SERPL CREATININE-BSD FRML MDRD: 88 ML/MIN/1.73M2
GLUCOSE BLD-MCNC: 105 MG/DL (ref 70–99)
POTASSIUM BLD-SCNC: 3.9 MMOL/L (ref 3.4–5.3)
SODIUM SERPL-SCNC: 141 MMOL/L (ref 133–144)

## 2022-08-15 ENCOUNTER — MYC MEDICAL ADVICE (OUTPATIENT)
Dept: FAMILY MEDICINE | Facility: CLINIC | Age: 82
End: 2022-08-15

## 2022-08-31 ENCOUNTER — TELEPHONE (OUTPATIENT)
Dept: RHEUMATOLOGY | Facility: CLINIC | Age: 82
End: 2022-08-31

## 2022-08-31 NOTE — TELEPHONE ENCOUNTER
[employer] [facility] Prior authorization team calling to complete clinical questions for a PA for methotrexate.

## 2022-09-01 NOTE — TELEPHONE ENCOUNTER
APPROVED after calling number below from 8/1/22-8/31/23. Called pt and left vm letting her know her injectable med was approved, if she has questions or needs teaching to call back.    Kerry MONTEMAYOR RN Specialty Triage 9/1/2022 12:30 PM

## 2022-09-02 NOTE — PLAN OF CARE
Problem: Goal Outcome Summary  Goal: Goal Outcome Summary  Outcome: Improving  624 A&O. VSS. Dressing intact. LS clear.  2 SHINE drains in place, patent.  Nichols catheter in place overnight.  Has PCA, for pain control.        (4) no limitation

## 2022-09-30 DIAGNOSIS — F41.9 ANXIETY: ICD-10-CM

## 2022-09-30 RX ORDER — BUSPIRONE HYDROCHLORIDE 10 MG/1
10 TABLET ORAL 2 TIMES DAILY
Qty: 60 TABLET | Refills: 0 | Status: SHIPPED | OUTPATIENT
Start: 2022-09-30 | End: 2022-11-16

## 2022-09-30 NOTE — TELEPHONE ENCOUNTER
Prescription approved per [facility] Refill Protocol.    MA/TC - Please call pt, Dr. Gordillo had wanted pt to follow up in 1 month on her visit on 8/12/22 since starting Buspar. Thanks!    Ayde Singh, RN, BSN, PHN  [facility]

## 2022-10-03 DIAGNOSIS — F41.9 ANXIETY: ICD-10-CM

## 2022-10-03 RX ORDER — BUSPIRONE HYDROCHLORIDE 10 MG/1
10 TABLET ORAL 2 TIMES DAILY
Qty: 60 TABLET | Refills: 0 | OUTPATIENT
Start: 2022-10-03

## 2022-10-03 NOTE — TELEPHONE ENCOUNTER
Medication was refilled on 9/30/22. Informed patient that was sent to [facility] pharmacy. Pt verbalized understanding.    busPIRone (BUSPAR) 10 MG tablet 60 tablet 0 9/30/2022  No   Sig - Route: TAKE 1 TABLET (10 MG) BY MOUTH 2 TIMES DAILY - Oral   Sent to pharmacy as: busPIRone HCl 10 MG Oral Tablet (BUSPAR)   Class: E-Prescribe   Order: [id number]   E-Prescribing Status: Receipt confirmed by pharmacy (9/30/2022  4:19 PM CDT)     Rocío ESCOTO RN

## 2022-10-03 NOTE — TELEPHONE ENCOUNTER
Reason for Call:  Other prescription    Detailed comments: PT IS SCHEDULED FOR 11/16 BUT WILL RUN OUT OF MEDICATIONS BEFORE THAT TIME AND WILL NEED THEM REFILLED BEFORE THAT APPOINTMENT.    Phone Number Patient can be reached at: Cell number on file:    Telephone Information:   Mobile [phone]       Best Time: ANYTIME    Can we leave a detailed message on this number? YES    Call taken on 10/3/2022 at 1:33 PM by Lucille Benavidez     Consent 2/Introductory Paragraph: Mohs surgery was explained to the patient and consent was obtained. The risks, benefits and alternatives to therapy were discussed in detail. Specifically, the risks of infection, scarring, bleeding, prolonged wound healing, incomplete removal, allergy to anesthesia, nerve injury and recurrence were addressed. Prior to the procedure, the treatment site was clearly identified and confirmed by the patient. All components of Universal Protocol/PAUSE Rule completed.

## 2022-10-03 NOTE — TELEPHONE ENCOUNTER
Buspirone is the medication, she is completely out. Initiated refill and sent to refill team.    Carolyn JACKSON  [facility]   [facility]

## 2022-10-03 NOTE — TELEPHONE ENCOUNTER
Left voice mail for patient to call back, on which medication needs to be refilled.    Carmen Minor MA

## 2022-10-09 ENCOUNTER — HEALTH MAINTENANCE LETTER (OUTPATIENT)
Age: 82
End: 2022-10-09

## 2022-10-11 ENCOUNTER — LAB (OUTPATIENT)
Dept: URGENT CARE | Facility: URGENT CARE | Age: 82
End: 2022-10-11
Attending: NURSE PRACTITIONER
Payer: COMMERCIAL

## 2022-10-11 ENCOUNTER — VIRTUAL VISIT (OUTPATIENT)
Dept: FAMILY MEDICINE | Facility: CLINIC | Age: 82
End: 2022-10-11
Payer: COMMERCIAL

## 2022-10-11 DIAGNOSIS — R51.9 INTRACTABLE HEADACHE, UNSPECIFIED CHRONICITY PATTERN, UNSPECIFIED HEADACHE TYPE: ICD-10-CM

## 2022-10-11 DIAGNOSIS — R52 BODY ACHES: ICD-10-CM

## 2022-10-11 DIAGNOSIS — R50.9 FEVER, UNSPECIFIED FEVER CAUSE: ICD-10-CM

## 2022-10-11 DIAGNOSIS — Z20.822 SUSPECTED COVID-19 VIRUS INFECTION: ICD-10-CM

## 2022-10-11 DIAGNOSIS — R50.9 FEVER, UNSPECIFIED FEVER CAUSE: Primary | ICD-10-CM

## 2022-10-11 LAB
FLUAV AG SPEC QL IA: NEGATIVE
FLUBV AG SPEC QL IA: NEGATIVE
SARS-COV-2 RNA RESP QL NAA+PROBE: NEGATIVE

## 2022-10-11 PROCEDURE — 87804 INFLUENZA ASSAY W/OPTIC: CPT

## 2022-10-11 PROCEDURE — U0003 INFECTIOUS AGENT DETECTION BY NUCLEIC ACID (DNA OR RNA); SEVERE ACUTE RESPIRATORY SYNDROME CORONAVIRUS 2 (SARS-COV-2) (CORONAVIRUS DISEASE [COVID-19]), AMPLIFIED PROBE TECHNIQUE, MAKING USE OF HIGH THROUGHPUT TECHNOLOGIES AS DESCRIBED BY CMS-2020-01-R: HCPCS

## 2022-10-11 PROCEDURE — U0005 INFEC AGEN DETEC AMPLI PROBE: HCPCS

## 2022-10-11 PROCEDURE — 99213 OFFICE O/P EST LOW 20 MIN: CPT | Mod: 95 | Performed by: NURSE PRACTITIONER

## 2022-10-11 NOTE — PATIENT INSTRUCTIONS
You are testing today for COVID and Influenza.   If you develops symptoms such as shortness of breath chest pain decreased oxygen saturation weakness high fever etc. should be seen in ED or call 911.  Encourage obtaining a pulse oximetry for at home. You can buy this on amazon or sometimes it is at your local pharmacy.  Oxygen saturation less than 90% need to be seen in person in the ER.

## 2022-10-11 NOTE — PROGRESS NOTES
"Ginger is a 82 year old who is being evaluated via a billable video visit.      How would you like to obtain your AVS? [employer]  If the video visit is dropped, the invitation should be resent by: Text to cell phone: [phone]  Will anyone else be joining your video visit? No    Assessment & Plan     Fever, unspecified fever cause  Body aches  Intractable headache, unspecified chronicity pattern, unspecified headache type  Reassuring exam no higher level of care felt to be needed.   Patient symptoms highly suspicious of viral etiology discussed COVID influenza or other/common cold virus.    Outpatient COVID and influenza testing ordered.  Will need COVID or influenza treatment if positive due to multiple risk factors.   It is important to know what she has so we know what to treat.  Scheduling ticket sent to [email or web address].  Exam today is reassuring-no reason for higher level of care felt at this time.   Quarantine and symptom control at home discussed. Questions answered.   Written education provided.   Red flag symptoms discussed and if these occur present to the emergency room or call 911.   Gniger verbalizes understanding of plan of care and is in agreement.      - Influenza A & B Antigen - Clinic Collect  - Symptomatic; Yes; 10/9/2022 COVID-19 Virus (Coronavirus) by PCR Nose    BMI:   Estimated body mass index is 35.28 kg/m  as calculated from the following:    Height as of 7/8/22: 1.651 m (5' 5\").    Weight as of 8/12/22: 96.2 kg (212 lb).     Return today (on 10/11/2022) for testing.      AKIRA [facility]   Ginger is a 82 year old, presenting for the following health issues:  Fever, Cough, and Headache      HPI     Acute Illness  Acute illness concerns: Cough, Headache, Fever  Onset/Duration: Sunday night -- Covid test this morning - Negative   Symptoms:  Fever: YES- yesterday 100.1, this morning 101.1  Chills/Sweats: YES- both  Headache (location?): YES- all over " - splitting   Sinus Pressure: No  Conjunctivitis:  YES- sensitive   Ear Pain: no  Rhinorrhea: YES- clear  Congestion: no  Sore Throat: YES- Sunday night - no longer  Cough: YES-non-productive  Wheeze: No  Decreased Appetite: YES  Nausea: No  Vomiting: No  Diarrhea: No  Dysuria/Freq.: No  Dysuria or Hematuria: No  Fatigue/Achiness: YES- achy  Sick/Strep Exposure: No  Therapies tried and outcome: Ice pack for headache - moderate relief, Tylenol - no relief      Review of Systems   Constitutional, HEENT, cardiovascular, pulmonary, GI, , musculoskeletal, neuro, skin, endocrine and psych systems are negative, except as otherwise noted in the HPI.      Objective         Vitals:  No vitals were obtained today due to virtual visit.    Physical Exam   GENERAL: Healthy, alert and no distress  EYES: Eyes grossly normal to inspection.  No discharge or erythema, or obvious scleral/conjunctival abnormalities.  RESP: No audible wheeze, cough, or visible cyanosis.  No visible retractions or increased work of breathing.    SKIN: Visible skin clear. No significant rash, abnormal pigmentation or lesions.  NEURO: Cranial nerves grossly intact.  Mentation and speech appropriate for age.  PSYCH: Mentation appears normal, affect normal/bright, judgement and insight intact, normal speech and appearance well-groomed.      Video-Visit Details    Video Start Time: 8:54 AM    Type of service:  Video Visit    Video End Time:9:08 AM    Originating Location (pt. Location): Home    Distant Location (provider location):  [facility]     Platform used for Video Visit: [employer]

## 2022-10-12 ENCOUNTER — APPOINTMENT (OUTPATIENT)
Dept: GENERAL RADIOLOGY | Facility: CLINIC | Age: 82
End: 2022-10-12
Attending: EMERGENCY MEDICINE
Payer: COMMERCIAL

## 2022-10-12 ENCOUNTER — HOSPITAL ENCOUNTER (EMERGENCY)
Facility: CLINIC | Age: 82
Discharge: HOME | End: 2022-10-12
Attending: EMERGENCY MEDICINE | Admitting: EMERGENCY MEDICINE
Payer: COMMERCIAL

## 2022-10-12 ENCOUNTER — MYC MEDICAL ADVICE (OUTPATIENT)
Dept: FAMILY MEDICINE | Facility: CLINIC | Age: 82
End: 2022-10-12

## 2022-10-12 VITALS
TEMPERATURE: 97.6 F | SYSTOLIC BLOOD PRESSURE: 143 MMHG | OXYGEN SATURATION: 94 % | RESPIRATION RATE: 16 BRPM | DIASTOLIC BLOOD PRESSURE: 84 MMHG | HEART RATE: 84 BPM

## 2022-10-12 DIAGNOSIS — B34.9 VIRAL SYNDROME: ICD-10-CM

## 2022-10-12 LAB
ALBUMIN SERPL BCG-MCNC: 4.1 G/DL (ref 3.5–5.2)
ALBUMIN UR-MCNC: NEGATIVE MG/DL
ALP SERPL-CCNC: 38 U/L (ref 35–104)
ALT SERPL W P-5'-P-CCNC: 37 U/L (ref 10–35)
ANION GAP SERPL CALCULATED.3IONS-SCNC: 12 MMOL/L (ref 7–15)
APPEARANCE UR: CLEAR
AST SERPL W P-5'-P-CCNC: 32 U/L (ref 10–35)
BACTERIA #/AREA URNS HPF: ABNORMAL /HPF
BASOPHILS # BLD AUTO: 0 10E3/UL (ref 0–0.2)
BASOPHILS NFR BLD AUTO: 0 %
BILIRUB SERPL-MCNC: 0.4 MG/DL
BILIRUB UR QL STRIP: NEGATIVE
BUN SERPL-MCNC: 8.3 MG/DL (ref 8–23)
CALCIUM SERPL-MCNC: 9.4 MG/DL (ref 8.8–10.2)
CHLORIDE SERPL-SCNC: 103 MMOL/L (ref 98–107)
COLOR UR AUTO: ABNORMAL
CREAT SERPL-MCNC: 0.64 MG/DL (ref 0.51–0.95)
DACRYOCYTES BLD QL SMEAR: SLIGHT
DEPRECATED HCO3 PLAS-SCNC: 25 MMOL/L (ref 22–29)
ELLIPTOCYTES BLD QL SMEAR: SLIGHT
EOSINOPHIL # BLD AUTO: 0.3 10E3/UL (ref 0–0.7)
EOSINOPHIL NFR BLD AUTO: 5 %
ERYTHROCYTE [DISTWIDTH] IN BLOOD BY AUTOMATED COUNT: 15.3 % (ref 10–15)
FLUAV RNA SPEC QL NAA+PROBE: NEGATIVE
FLUBV RNA RESP QL NAA+PROBE: NEGATIVE
GFR SERPL CREATININE-BSD FRML MDRD: 89 ML/MIN/1.73M2
GLUCOSE SERPL-MCNC: 99 MG/DL (ref 70–99)
GLUCOSE UR STRIP-MCNC: NEGATIVE MG/DL
HCT VFR BLD AUTO: 37.7 % (ref 35–47)
HGB BLD-MCNC: 11.7 G/DL (ref 11.7–15.7)
HGB UR QL STRIP: ABNORMAL
IMM GRANULOCYTES # BLD: 0 10E3/UL
IMM GRANULOCYTES NFR BLD: 1 %
KETONES UR STRIP-MCNC: 20 MG/DL
LEUKOCYTE ESTERASE UR QL STRIP: ABNORMAL
LYMPHOCYTES # BLD AUTO: 0.9 10E3/UL (ref 0.8–5.3)
LYMPHOCYTES NFR BLD AUTO: 16 %
MCH RBC QN AUTO: 30.4 PG (ref 26.5–33)
MCHC RBC AUTO-ENTMCNC: 31 G/DL (ref 31.5–36.5)
MCV RBC AUTO: 98 FL (ref 78–100)
MONOCYTES # BLD AUTO: 1.1 10E3/UL (ref 0–1.3)
MONOCYTES NFR BLD AUTO: 21 %
MUCOUS THREADS #/AREA URNS LPF: PRESENT /LPF
NEUTROPHILS # BLD AUTO: 3.1 10E3/UL (ref 1.6–8.3)
NEUTROPHILS NFR BLD AUTO: 57 %
NITRATE UR QL: NEGATIVE
NRBC # BLD AUTO: 0 10E3/UL
NRBC BLD AUTO-RTO: 0 /100
PH UR STRIP: 5.5 [PH] (ref 5–7)
PLAT MORPH BLD: ABNORMAL
PLATELET # BLD AUTO: 320 10E3/UL (ref 150–450)
POLYCHROMASIA BLD QL SMEAR: SLIGHT
POTASSIUM SERPL-SCNC: 4.1 MMOL/L (ref 3.4–5.3)
PROCALCITONIN SERPL IA-MCNC: 0.07 NG/ML
PROT SERPL-MCNC: 7.5 G/DL (ref 6.4–8.3)
RBC # BLD AUTO: 3.85 10E6/UL (ref 3.8–5.2)
RBC MORPH BLD: ABNORMAL
RBC URINE: 1 /HPF
RSV RNA SPEC NAA+PROBE: NEGATIVE
SARS-COV-2 RNA RESP QL NAA+PROBE: NEGATIVE
SODIUM SERPL-SCNC: 140 MMOL/L (ref 136–145)
SP GR UR STRIP: 1.01 (ref 1–1.03)
SQUAMOUS EPITHELIAL: <1 /HPF
UROBILINOGEN UR STRIP-MCNC: NORMAL MG/DL
WBC # BLD AUTO: 5.4 10E3/UL (ref 4–11)
WBC URINE: 1 /HPF

## 2022-10-12 PROCEDURE — 99284 EMERGENCY DEPT VISIT MOD MDM: CPT | Mod: 25

## 2022-10-12 PROCEDURE — 87637 SARSCOV2&INF A&B&RSV AMP PRB: CPT | Performed by: EMERGENCY MEDICINE

## 2022-10-12 PROCEDURE — 96374 THER/PROPH/DIAG INJ IV PUSH: CPT

## 2022-10-12 PROCEDURE — 250N000009 HC RX 250: Performed by: EMERGENCY MEDICINE

## 2022-10-12 PROCEDURE — 36415 COLL VENOUS BLD VENIPUNCTURE: CPT | Performed by: EMERGENCY MEDICINE

## 2022-10-12 PROCEDURE — 84145 PROCALCITONIN (PCT): CPT | Performed by: EMERGENCY MEDICINE

## 2022-10-12 PROCEDURE — C9803 HOPD COVID-19 SPEC COLLECT: HCPCS

## 2022-10-12 PROCEDURE — 250N000011 HC RX IP 250 OP 636: Performed by: EMERGENCY MEDICINE

## 2022-10-12 PROCEDURE — 81001 URINALYSIS AUTO W/SCOPE: CPT | Performed by: EMERGENCY MEDICINE

## 2022-10-12 PROCEDURE — 85004 AUTOMATED DIFF WBC COUNT: CPT | Performed by: EMERGENCY MEDICINE

## 2022-10-12 PROCEDURE — 80053 COMPREHEN METABOLIC PANEL: CPT | Performed by: EMERGENCY MEDICINE

## 2022-10-12 PROCEDURE — 71046 X-RAY EXAM CHEST 2 VIEWS: CPT

## 2022-10-12 PROCEDURE — 82040 ASSAY OF SERUM ALBUMIN: CPT | Performed by: EMERGENCY MEDICINE

## 2022-10-12 RX ORDER — KETOROLAC TROMETHAMINE 15 MG/ML
15 INJECTION, SOLUTION INTRAMUSCULAR; INTRAVENOUS ONCE
Status: COMPLETED | OUTPATIENT
Start: 2022-10-12 | End: 2022-10-12

## 2022-10-12 RX ORDER — GUAIFENESIN 600 MG/1
600 TABLET, EXTENDED RELEASE ORAL 2 TIMES DAILY
Qty: 20 TABLET | Refills: 0 | Status: ON HOLD | OUTPATIENT
Start: 2022-10-12 | End: 2023-02-15

## 2022-10-12 RX ORDER — IPRATROPIUM BROMIDE AND ALBUTEROL SULFATE 2.5; .5 MG/3ML; MG/3ML
3 SOLUTION RESPIRATORY (INHALATION) ONCE
Status: COMPLETED | OUTPATIENT
Start: 2022-10-12 | End: 2022-10-12

## 2022-10-12 RX ORDER — IPRATROPIUM BROMIDE AND ALBUTEROL SULFATE 2.5; .5 MG/3ML; MG/3ML
1 SOLUTION RESPIRATORY (INHALATION) EVERY 6 HOURS PRN
Qty: 90 ML | Refills: 0 | Status: SHIPPED | OUTPATIENT
Start: 2022-10-12 | End: 2022-10-25

## 2022-10-12 RX ORDER — BENZONATATE 100 MG/1
100 CAPSULE ORAL 3 TIMES DAILY PRN
Qty: 15 CAPSULE | Refills: 0 | Status: SHIPPED | OUTPATIENT
Start: 2022-10-12 | End: 2022-10-25

## 2022-10-12 RX ADMIN — KETOROLAC TROMETHAMINE 15 MG: 15 INJECTION, SOLUTION INTRAMUSCULAR; INTRAVENOUS at 12:14

## 2022-10-12 RX ADMIN — IPRATROPIUM BROMIDE AND ALBUTEROL SULFATE 3 ML: .5; 3 SOLUTION RESPIRATORY (INHALATION) at 12:15

## 2022-10-12 ASSESSMENT — ENCOUNTER SYMPTOMS
VOMITING: 0
COUGH: 1
DIFFICULTY URINATING: 0
DYSURIA: 0
FEVER: 1
HEMATURIA: 0
MYALGIAS: 1
FREQUENCY: 0
HEADACHES: 1
DIARRHEA: 0

## 2022-10-12 ASSESSMENT — ACTIVITIES OF DAILY LIVING (ADL): ADLS_ACUITY_SCORE: 35

## 2022-10-12 NOTE — RESULT ENCOUNTER NOTE
Note to Staff: please call the patient to explain results and to check on current symptoms.      Patient seen virtually yesterday for cough fevers and not feeling well.  COVID and influenza are negative.  Please ask how she is doing today.  She is at high risk for worsening she also has autoimmune disease.  If she is having significant worsening today and continuation of symptoms I would recommend her being seen in clinic for vital signs listening to her lungs etc.    Pili Mendieta, TYRAP-BC

## 2022-10-12 NOTE — ED TRIAGE NOTES
"    Pt states that Sunday night developed sore throat and runny nose. Then developed \"splitting headache\" and body aches. Temp 102 yesterday. Has not taken tylenol today. Denies nausea, vomiting, diarrhea. Had covid and flu test yesterday which was negative.   "

## 2022-10-12 NOTE — DISCHARGE INSTRUCTIONS
We have done this test for you including chest x-ray urine test and lab work.  Body aches and coughing can be viral.  We see no need for antibiotics at this time.  Use Mucinex and/or Tessalon Perles for coughing.  Trial DuoNeb as you have been smoking in the past and might help with bronchial dilatation.  Okay to use Tylenol or ibuprofen for fever or body aches.  Return to the emergency room with severe increase in shortness of breath or fever not responsive antifever medications please continue to follow-up with your regular doctor if you continue not to feel well.  Thanks for your patience today.

## 2022-10-12 NOTE — ED PROVIDER NOTES
History     Chief Complaint:  Cold/Flu Symptoms     HPI:  The history is provided by the patient.      Ginger Marshall is a 82 year old female with history of rheumatoid arthritis, hyperlipidemia, COPD, tobacco use disorder who presents with symptoms of infectious illness including fevers, generalized body aches, headaches, and cough which began 3 days ago. Since the onset of her symptoms, her highest recorded fever has been 102 F. Her cough has been intermittently productive of yellow sputum, though she does note that she her cough has been somewhat chronic. She denies being previously diagnosed with asthma or COPD, although this is listed as a diagnosis in her electronic medical records. She also mentions that she has had tinnitus for the past few days. For symptom management, she has been taking Tylenol which she last took last night. She was seen at urgent care yesterday where she was tested for COVID-19 and influenza, both of which were negative. Ginger has had no vomiting, diarrhea, dysuria, urinary frequency, or other urinary symptoms.    Review of Systems   Constitutional: Positive for fever.   HENT: Positive for tinnitus.    Respiratory: Positive for cough.    Gastrointestinal: Negative for diarrhea and vomiting.   Genitourinary: Negative for decreased urine volume, difficulty urinating, dysuria, frequency, hematuria and urgency.   Musculoskeletal: Positive for myalgias.   Neurological: Positive for headaches.   All other systems reviewed and are negative.    Allergies:  Abatacept  Celecoxib  Ethanol  Sulfa drugs  Tramadol  Valdecoxib  Adhesive  Antihistamines    Medications:  Lipitor  Buspar  Pristiq  Leucovorin  Claritin  Methotrexate  Protonix  Lyrica  Carafate  Zanaflex  Rinvoq  Valacyclovir    Past Medical History:     Rheumatoid arthritis  Colonic polyps  Multinodular goiter  Pulmonary nodule  Posterior vitreous detachment, bilateral  Pseudoexfoliation of lens capsule, right eye  GERD  Hyperlipidemia    Neuropathy  SHERRY  Osteoporosis  Cornea guttata, bilateral   Conjunctival concretions  Spinal stenosis, lumbar  Obesity   Iron deficiency anemia  Meibomian gland dysfunction  Blepharitis, both eyes  Chronic bilateral low back pain without sciatica  Anxiety  Depression   DDD, lumbar and cervical  Chronic right shoulder pain  Rheumatic mitral stenosis  Osteoarthritis  Closed fracture of multiple ribs of left side  COPD  Tobacco use disorder  Gastroenteritis   Herniated nucleus pulposus, L3-4   Migraine   Pelvic fracture   Rheumatoid arteritis  Vitamin B12 deficiency  PUD  Osteoarthrosis  Varicella  Measles  Tubular adenoma    Past Surgical History:     section x2  Total knee arthroplasty, bilateral  Spinal disc surgery  Breast biopsy/lumpectomy  Cataract extraction with IOL implant, bilateral  Finger surgery, right MCP  Hand surgery, right  Lumbar fusion  Total hip arthroplasty, right  Hammer toe repair, bilateral  Bunionectomy, bilateral  Rotator cuff repair    Family History:    Mother: arthritis, Alzheimer disease, hyperlipidemia, osteoporosis  Father: MI, substance abuse  Sister: arthritis, hypertension, leukemia, schizophrenia, hypertension, hyperlipidemia   Son: cancer, type I diabetes mellitus, substance abuse  Daughter: glaucoma    Social History:  The patient presents to the ED alone.  The patient presents to the ED via car.  The patient is a former smoker.     Physical Exam     Patient Vitals for the past 24 hrs:   BP Temp Temp src Pulse Resp SpO2   10/12/22 1330 (!) 143/84 -- -- 84 -- 94 %   10/12/22 1303 (!) 146/79 -- -- 84 -- 91 %   10/12/22 1233 (!) 144/77 -- -- 79 -- 94 %   10/12/22 1220 134/72 -- -- 84 -- 96 %   10/12/22 1215 -- -- -- -- -- 95 %   10/12/22 1018 133/87 97.6  F (36.4  C) Temporal 90 16 98 %     Physical Exam  HENT:      Head: Normocephalic.      Right Ear: Tympanic membrane normal.      Left Ear: Tympanic membrane normal.   Eyes:      Conjunctiva/sclera: Conjunctivae normal.       Pupils: Pupils are equal, round, and reactive to light.   Cardiovascular:      Rate and Rhythm: Normal rate.   Pulmonary:      Effort: Pulmonary effort is normal.   Abdominal:      General: Abdomen is flat.   Musculoskeletal:         General: Normal range of motion.   Skin:     General: Skin is warm.      Capillary Refill: Capillary refill takes less than 2 seconds.   Neurological:      General: No focal deficit present.      Mental Status: She is alert and oriented to person, place, and time.   Psychiatric:         Mood and Affect: Mood normal.           Emergency Department Course     Imaging:    Chest XR,  PA & LAT  Negative chest.  Report per radiology    Laboratory:  Labs Ordered and Resulted from Time of ED Arrival to Time of ED Departure   COMPREHENSIVE METABOLIC PANEL - Abnormal       Result Value    Sodium 140      Potassium 4.1      Chloride 103      Carbon Dioxide (CO2) 25      Anion Gap 12      Urea Nitrogen 8.3      Creatinine 0.64      Calcium 9.4      Glucose 99      Alkaline Phosphatase 38      AST 32      ALT 37 (*)     Protein Total 7.5      Albumin 4.1      Bilirubin Total 0.4      GFR Estimate 89     ROUTINE UA WITH MICROSCOPIC REFLEX TO CULTURE - Abnormal    Color Urine Light Yellow      Appearance Urine Clear      Glucose Urine Negative      Bilirubin Urine Negative      Ketones Urine 20 (*)     Specific Gravity Urine 1.011      Blood Urine Small (*)     pH Urine 5.5      Protein Albumin Urine Negative      Urobilinogen Urine Normal      Nitrite Urine Negative      Leukocyte Esterase Urine Trace (*)     Bacteria Urine Few (*)     Mucus Urine Present (*)     RBC Urine 1      WBC Urine 1      Squamous Epithelials Urine <1     PROCALCITONIN - Abnormal    Procalcitonin 0.07 (*)    CBC WITH PLATELETS AND DIFFERENTIAL - Abnormal    WBC Count 5.4      RBC Count 3.85      Hemoglobin 11.7      Hematocrit 37.7      MCV 98      MCH 30.4      MCHC 31.0 (*)     RDW 15.3 (*)     Platelet Count 320      %  Neutrophils 57      % Lymphocytes 16      % Monocytes 21      % Eosinophils 5      % Basophils 0      % Immature Granulocytes 1      NRBCs per 100 WBC 0      Absolute Neutrophils 3.1      Absolute Lymphocytes 0.9      Absolute Monocytes 1.1      Absolute Eosinophils 0.3      Absolute Basophils 0.0      Absolute Immature Granulocytes 0.0      Absolute NRBCs 0.0     RBC AND PLATELET MORPHOLOGY - Abnormal    Platelet Assessment        Value: Automated Count Confirmed. Platelet morphology is normal.    Elliptocytes Slight (*)     Polychromasia Slight (*)     Teardrop Cells Slight (*)     RBC Morphology Confirmed RBC Indices     INFLUENZA A/B & SARS-COV2 PCR MULTIPLEX - Normal    Influenza A PCR Negative      Influenza B PCR Negative      RSV PCR Negative      SARS CoV2 PCR Negative        Emergency Department Course:       Reviewed:  I reviewed nursing notes, vitals, past medical history and Care Everywhere    Assessments:  1127 I obtained history and examined the patient as noted above.   1430 I rechecked the patient and explained findings.     Interventions:  1214 Toradol 15 mg IV  1215 Duoneb 3 mL nebulization     Disposition:  The patient was discharged to home.     Impression & Plan     Medical Decision Making:  Ginger Marshall is a 82 year old female who presents to the emergency department for evaluation of sore throat and coughing.  Patient is well-appearing with normal oxygen saturations x-ray is normal lab work for bacterial infection including white count and procalcitonin are all normal.  UA is negative viral screening is also normal.  Symptoms are highly suspicious for viral illness due to body aches sore throat and coughing and runny nose patient describes headache and fever at home but is afebrile at this time.  Suspect evolving viral infection.  Care was discussed with the patient we will offer supportive medications and follow-up in the clinic no need for antibiotics identified at this  time.    Diagnosis:    ICD-10-CM    1. Viral syndrome  B34.9         Discharge Medications:   Details   benzonatate (TESSALON) 100 MG capsule Take 1 capsule (100 mg) by mouth 3 times daily as needed for cough, Disp-15 capsule, R-0, Local Print      guaiFENesin (MUCINEX) 600 MG 12 hr tablet Take 1 tablet (600 mg) by mouth 2 times daily, Disp-20 tablet, R-0, Local Print      ipratropium - albuterol 0.5 mg/2.5 mg/3 mL (DUONEB) 0.5-2.5 (3) MG/3ML neb solution Take 1 vial (3 mLs) by nebulization every 6 hours as needed for shortness of breath / dyspnea or wheezing, Disp-90 mL, R-0, Local Print     Scribe Disclosure:  I, Brigid Platt, am serving as a scribe at 11:27 AM on 10/12/2022 to document services personally performed by Kuldip Tyson MD based on my observations and the provider's statements to me.      Kuldip Tyson MD  10/15/22 2000

## 2022-10-13 DIAGNOSIS — Z92.29 HISTORY OF BISPHOSPHONATE THERAPY: Primary | ICD-10-CM

## 2022-10-13 DIAGNOSIS — M81.0 OSTEOPOROSIS, UNSPECIFIED OSTEOPOROSIS TYPE, UNSPECIFIED PATHOLOGICAL FRACTURE PRESENCE: ICD-10-CM

## 2022-10-13 RX ORDER — HEPARIN SODIUM (PORCINE) LOCK FLUSH IV SOLN 100 UNIT/ML 100 UNIT/ML
5 SOLUTION INTRAVENOUS
Status: CANCELLED | OUTPATIENT
Start: 2023-03-21

## 2022-10-13 RX ORDER — MEPERIDINE HYDROCHLORIDE 25 MG/ML
25 INJECTION INTRAMUSCULAR; INTRAVENOUS; SUBCUTANEOUS EVERY 30 MIN PRN
Status: CANCELLED | OUTPATIENT
Start: 2023-03-21

## 2022-10-13 RX ORDER — DIPHENHYDRAMINE HYDROCHLORIDE 50 MG/ML
50 INJECTION INTRAMUSCULAR; INTRAVENOUS
Status: CANCELLED
Start: 2023-03-21

## 2022-10-13 RX ORDER — METHYLPREDNISOLONE SODIUM SUCCINATE 125 MG/2ML
125 INJECTION, POWDER, LYOPHILIZED, FOR SOLUTION INTRAMUSCULAR; INTRAVENOUS
Status: CANCELLED
Start: 2023-03-21

## 2022-10-13 RX ORDER — ALBUTEROL SULFATE 0.83 MG/ML
2.5 SOLUTION RESPIRATORY (INHALATION)
Status: CANCELLED | OUTPATIENT
Start: 2023-03-21

## 2022-10-13 RX ORDER — ZOLEDRONIC ACID 5 MG/100ML
5 INJECTION, SOLUTION INTRAVENOUS ONCE
Status: CANCELLED
Start: 2023-03-21

## 2022-10-13 RX ORDER — HEPARIN SODIUM,PORCINE 10 UNIT/ML
5 VIAL (ML) INTRAVENOUS
Status: CANCELLED | OUTPATIENT
Start: 2023-03-21

## 2022-10-13 RX ORDER — ALBUTEROL SULFATE 90 UG/1
1-2 AEROSOL, METERED RESPIRATORY (INHALATION)
Status: CANCELLED
Start: 2023-03-21

## 2022-10-13 RX ORDER — EPINEPHRINE 1 MG/ML
0.3 INJECTION, SOLUTION, CONCENTRATE INTRAVENOUS EVERY 5 MIN PRN
Status: CANCELLED | OUTPATIENT
Start: 2023-03-21

## 2022-10-13 NOTE — TELEPHONE ENCOUNTER
[facility] message sent to patient.   Upon [facility] message and chart review-patient went to the ER 10/12/22 for viral related symptoms.    10/11 virtual visit with Pili Mendieta for related symptoms.     ED Discharge medication   Tessalon, Mucinex and albuterol neb  As needed pcp follow up         Luci HADLEY RN   [facility] Triage

## 2022-10-20 ENCOUNTER — LAB (OUTPATIENT)
Dept: LAB | Facility: CLINIC | Age: 82
End: 2022-10-20
Payer: COMMERCIAL

## 2022-10-20 ENCOUNTER — TELEPHONE (OUTPATIENT)
Dept: PEDIATRICS | Facility: CLINIC | Age: 82
End: 2022-10-20

## 2022-10-20 DIAGNOSIS — Z11.59 ENCOUNTER FOR SCREENING FOR OTHER VIRAL DISEASES: ICD-10-CM

## 2022-10-20 DIAGNOSIS — M05.79 RHEUMATOID ARTHRITIS INVOLVING MULTIPLE SITES WITH POSITIVE RHEUMATOID FACTOR (H): ICD-10-CM

## 2022-10-20 DIAGNOSIS — Z79.899 HIGH RISK MEDICATION USE: ICD-10-CM

## 2022-10-20 LAB
CRP SERPL-MCNC: 22.9 MG/L
ERYTHROCYTE [SEDIMENTATION RATE] IN BLOOD BY WESTERGREN METHOD: 31 MM/HR (ref 0–30)

## 2022-10-20 PROCEDURE — 86140 C-REACTIVE PROTEIN: CPT

## 2022-10-20 PROCEDURE — 82565 ASSAY OF CREATININE: CPT

## 2022-10-20 PROCEDURE — 86481 TB AG RESPONSE T-CELL SUSP: CPT

## 2022-10-20 PROCEDURE — 86704 HEP B CORE ANTIBODY TOTAL: CPT

## 2022-10-20 PROCEDURE — 80076 HEPATIC FUNCTION PANEL: CPT

## 2022-10-20 PROCEDURE — 36415 COLL VENOUS BLD VENIPUNCTURE: CPT

## 2022-10-20 PROCEDURE — 85025 COMPLETE CBC W/AUTO DIFF WBC: CPT

## 2022-10-20 PROCEDURE — 87340 HEPATITIS B SURFACE AG IA: CPT

## 2022-10-20 PROCEDURE — 86803 HEPATITIS C AB TEST: CPT

## 2022-10-20 PROCEDURE — 85652 RBC SED RATE AUTOMATED: CPT

## 2022-10-21 ENCOUNTER — TELEPHONE (OUTPATIENT)
Dept: PEDIATRICS | Facility: CLINIC | Age: 82
End: 2022-10-21

## 2022-10-21 ENCOUNTER — ANCILLARY PROCEDURE (OUTPATIENT)
Dept: GENERAL RADIOLOGY | Facility: CLINIC | Age: 82
End: 2022-10-21
Attending: NURSE PRACTITIONER
Payer: COMMERCIAL

## 2022-10-21 ENCOUNTER — OFFICE VISIT (OUTPATIENT)
Dept: PEDIATRICS | Facility: CLINIC | Age: 82
End: 2022-10-21
Payer: COMMERCIAL

## 2022-10-21 ENCOUNTER — ALLIED HEALTH/NURSE VISIT (OUTPATIENT)
Dept: FAMILY MEDICINE | Facility: CLINIC | Age: 82
End: 2022-10-21
Payer: COMMERCIAL

## 2022-10-21 VITALS
WEIGHT: 200 LBS | HEIGHT: 65 IN | OXYGEN SATURATION: 98 % | RESPIRATION RATE: 22 BRPM | DIASTOLIC BLOOD PRESSURE: 85 MMHG | TEMPERATURE: 98.2 F | HEART RATE: 79 BPM | BODY MASS INDEX: 33.32 KG/M2 | SYSTOLIC BLOOD PRESSURE: 119 MMHG

## 2022-10-21 DIAGNOSIS — Z51.81 ENCOUNTER FOR THERAPEUTIC DRUG MONITORING: ICD-10-CM

## 2022-10-21 DIAGNOSIS — R06.02 SOB (SHORTNESS OF BREATH): ICD-10-CM

## 2022-10-21 DIAGNOSIS — R50.9 FEELS FEVERISH: ICD-10-CM

## 2022-10-21 DIAGNOSIS — Z00.8 ENCOUNTER FOR ELECTROCARDIOGRAM: Primary | ICD-10-CM

## 2022-10-21 DIAGNOSIS — R05.1 ACUTE COUGH: ICD-10-CM

## 2022-10-21 DIAGNOSIS — R50.9 FEVER, UNSPECIFIED FEVER CAUSE: ICD-10-CM

## 2022-10-21 DIAGNOSIS — R05.1 ACUTE COUGH: Primary | ICD-10-CM

## 2022-10-21 LAB
ALBUMIN SERPL-MCNC: 4.1 G/DL (ref 3.4–5)
ALP SERPL-CCNC: 46 U/L (ref 40–150)
ALT SERPL W P-5'-P-CCNC: 38 U/L (ref 0–50)
AST SERPL W P-5'-P-CCNC: 26 U/L (ref 0–45)
BASOPHILS # BLD AUTO: 0 10E3/UL (ref 0–0.2)
BASOPHILS NFR BLD AUTO: 0 %
BILIRUB DIRECT SERPL-MCNC: <0.1 MG/DL (ref 0–0.2)
BILIRUB SERPL-MCNC: 0.4 MG/DL (ref 0.2–1.3)
CREAT SERPL-MCNC: 0.61 MG/DL (ref 0.52–1.04)
EOSINOPHIL # BLD AUTO: 0.3 10E3/UL (ref 0–0.7)
EOSINOPHIL NFR BLD AUTO: 4 %
ERYTHROCYTE [DISTWIDTH] IN BLOOD BY AUTOMATED COUNT: 15.3 % (ref 10–15)
GFR SERPL CREATININE-BSD FRML MDRD: 90 ML/MIN/1.73M2
HBV CORE AB SERPL QL IA: NONREACTIVE
HBV SURFACE AG SERPL QL IA: NONREACTIVE
HCT VFR BLD AUTO: 37.3 % (ref 35–47)
HCV AB SERPL QL IA: NONREACTIVE
HGB BLD-MCNC: 11.4 G/DL (ref 11.7–15.7)
IMM GRANULOCYTES # BLD: 0.1 10E3/UL
IMM GRANULOCYTES NFR BLD: 1 %
LYMPHOCYTES # BLD AUTO: 1.1 10E3/UL (ref 0.8–5.3)
LYMPHOCYTES NFR BLD AUTO: 18 %
MCH RBC QN AUTO: 30.4 PG (ref 26.5–33)
MCHC RBC AUTO-ENTMCNC: 30.6 G/DL (ref 31.5–36.5)
MCV RBC AUTO: 100 FL (ref 78–100)
MONOCYTES # BLD AUTO: 0.9 10E3/UL (ref 0–1.3)
MONOCYTES NFR BLD AUTO: 15 %
NEUTROPHILS # BLD AUTO: 3.6 10E3/UL (ref 1.6–8.3)
NEUTROPHILS NFR BLD AUTO: 62 %
NRBC # BLD AUTO: 0 10E3/UL
NRBC BLD AUTO-RTO: 0 /100
PLATELET # BLD AUTO: 367 10E3/UL (ref 150–450)
PROT SERPL-MCNC: 7.8 G/DL (ref 6.8–8.8)
RBC # BLD AUTO: 3.75 10E6/UL (ref 3.8–5.2)
WBC # BLD AUTO: 5.9 10E3/UL (ref 4–11)

## 2022-10-21 PROCEDURE — 99214 OFFICE O/P EST MOD 30 MIN: CPT | Performed by: NURSE PRACTITIONER

## 2022-10-21 PROCEDURE — 71046 X-RAY EXAM CHEST 2 VIEWS: CPT | Mod: TC | Performed by: RADIOLOGY

## 2022-10-21 PROCEDURE — 99207 PR NO CHARGE NURSE ONLY: CPT

## 2022-10-21 PROCEDURE — 93000 ELECTROCARDIOGRAM COMPLETE: CPT | Performed by: NURSE PRACTITIONER

## 2022-10-21 RX ORDER — CEFDINIR 300 MG/1
300 CAPSULE ORAL 2 TIMES DAILY
Qty: 10 CAPSULE | Refills: 0 | Status: CANCELLED | OUTPATIENT
Start: 2022-10-21 | End: 2022-10-26

## 2022-10-21 RX ORDER — ALBUTEROL SULFATE 0.83 MG/ML
2.5 SOLUTION RESPIRATORY (INHALATION) EVERY 6 HOURS PRN
Qty: 90 ML | Refills: 0 | Status: CANCELLED | OUTPATIENT
Start: 2022-10-21

## 2022-10-21 RX ORDER — ALBUTEROL SULFATE 90 UG/1
2 AEROSOL, METERED RESPIRATORY (INHALATION) EVERY 6 HOURS
Qty: 18 G | Refills: 0 | Status: ON HOLD | OUTPATIENT
Start: 2022-10-21 | End: 2023-02-15

## 2022-10-21 RX ORDER — LEVOFLOXACIN 750 MG/1
750 TABLET, FILM COATED ORAL DAILY
Qty: 5 TABLET | Refills: 0 | Status: SHIPPED | OUTPATIENT
Start: 2022-10-21 | End: 2022-10-25 | Stop reason: SINTOL

## 2022-10-21 ASSESSMENT — PATIENT HEALTH QUESTIONNAIRE - PHQ9
SUM OF ALL RESPONSES TO PHQ QUESTIONS 1-9: 14
10. IF YOU CHECKED OFF ANY PROBLEMS, HOW DIFFICULT HAVE THESE PROBLEMS MADE IT FOR YOU TO DO YOUR WORK, TAKE CARE OF THINGS AT HOME, OR GET ALONG WITH OTHER PEOPLE: EXTREMELY DIFFICULT
SUM OF ALL RESPONSES TO PHQ QUESTIONS 1-9: 14

## 2022-10-21 ASSESSMENT — PAIN SCALES - GENERAL: PAINLEVEL: SEVERE PAIN (6)

## 2022-10-21 ASSESSMENT — ENCOUNTER SYMPTOMS: COUGH: 1

## 2022-10-21 NOTE — PATIENT INSTRUCTIONS
Take the antibiotic daily    Use your albuterol inhaler scheduled every 4 -6 hours for the next 2-3 days, then use every 4-6 hours as needed for cough, wheeze, or shortness of breath.    Stop taking the ambien while sick    If you have fever, worsening breathing, chest pain or worsening symptoms you need to go to the ER

## 2022-10-21 NOTE — TELEPHONE ENCOUNTER
I ended up speaking to pt  She is agreeable to do EKG but prefers [facility]  I want it done today  Can you call [facility] and see if they can do a NO for EKG please? I'm looking specifically to rule out any QT prolongation.  Ariane Hu, APRN, CNP

## 2022-10-21 NOTE — TELEPHONE ENCOUNTER
Can you call pt-  I spoke to the pharmacist about levaquin.    The antibiotic can cause QT prolongation and would be ideal for her to get EKG updated to ensure she doesn't have this (last EKG 2019)    If more convenient for her she can schedule this at [facility] but should be done today.   Ariane Hu, APRN, CNP

## 2022-10-21 NOTE — PROGRESS NOTES
Assessment & Plan   Fever, unspecified fever cause  Acute cough  Feels feverish  SOB (shortness of breath)  >2 weeks of symptoms, treat for possible CAP. Also want her to increase use of inhaler (3-4x per day) , ok to switch to plain albuterol inhaler from duonebs. Will follow up in 3 days, sooner if worsening or new symptoms.  - XR Chest 2 Views; Future  - levofloxacin (LEVAQUIN) 750 MG tablet; Take 1 tablet (750 mg) by mouth daily for 5 days  - albuterol (PROAIR HFA/PROVENTIL HFA/VENTOLIN HFA) 108 (90 Base) MCG/ACT inhaler; Inhale 2 puffs into the lungs every 6 hours    Encounter for therapeutic drug monitoring  No QT prolongation on EKG, ok to proceed with fqn although we discussed risk and possible s/e.  -augmentin can increase concentration of methotrexate-high severity warning  -Doxy may also increase concentration of methotrexate  -Azithromycin risk of QT prolongation and also risk of rhabdo with statin  -Unsure when she last took methotrexate during this illness  -No renal adjustments needed  - EKG 12-lead complete w/read - Clinics      Patient Instructions   Take the antibiotic daily    Use your albuterol inhaler scheduled every 4 -6 hours for the next 2-3 days, then use every 4-6 hours as needed for cough, wheeze, or shortness of breath.    Stop taking the ambien while sick    If you have fever, worsening breathing, chest pain or worsening symptoms you need to go to the ER      Return in about 2 weeks (around 11/4/2022), or if symptoms worsen or fail to improve.    Ariane Hu NP  [facility] MIAN Miles is a 82 year old, presenting for the following health issues:  Cough      Cough    History of Present Illness       Back Pain:  She presents for follow up of back pain. Patient's back pain is a new problem.    Original cause of back pain: other  First noticed back pain: in the last week  Patient feels back pain: dailyLocation of back pain:  Left middle of  "back  Description of back pain: dull ache and fullness  Back pain spreads: nowhere    Since patient first noticed back pain, pain is: always present, but gets better and worse  Does back pain interfere with her job:  Yes  On a scale of 1-10 (10 being the worst), patient describes pain as:  5  What makes back pain worse: coughing and certain positions  Acupuncture: not tried  Acetaminophen: not helpful  Activity or exercise: not tried  Chiropractor:  Not helpful  Cold: not helpful  Heat: not helpful  Massage: not helpful  Muscle relaxants: not tried  NSAIDS: not helpful  Opioids: not tried  Physical Therapy: not helpful  Rest: not helpful  Steroid Injection: not helpful  Stretching: not helpful  Surgery: not helpful  TENS unit: not helpful  Topical pain relievers: not helpful  Other healthcare providers patient is seeing for back pain: None    Headaches:   Since the patient's last clinic visit, headaches are: no change  The patient is getting headaches:  When im sick  She is not able to do normal daily activities when she has a migraine.  The patient is taking the following rescue/relief medications:  Naproxyn (Aleve) and Tylenol   Patient states \"I get some relief\" from the rescue/relief medications.   The patient is taking the following medications to prevent migraines:  No medications to prevent migraines  In the past 4 weeks, the patient has gone to an Urgent Care or Emergency Room 1 time times due to headaches.    Reason for visit:  I have been sick for 2 weeks with fever runny nose cough headache  Symptom onset:  1-2 weeks ago  Symptoms include:  Runny nose, cough, headache, tightness in chest and back  Symptom intensity:  Severe  Symptom progression:  Worsening  Had these symptoms before:  Yes  Has tried/received treatment for these symptoms:  Yes  Previous treatment was successful:  Yes  Prior treatment description:  Antibiotics and cough medicne with codine  What makes it worse:  Not getting treatment  What " "makes it better:  Meds    She eats 2-3 servings of fruits and vegetables daily.She consumes 0 sweetened beverage(s) daily.She exercises with enough effort to increase her heart rate 9 or less minutes per day.  She exercises with enough effort to increase her heart rate 3 or less days per week.   She is taking medications regularly.    Today's PHQ-9         PHQ-9 Total Score: 14    PHQ-9 Q9 Thoughts of better off dead/self-harm past 2 weeks :   Not at all    How difficult have these problems made it for you to do your work, take care of things at home, or get along with other people: Extremely difficult    #URI  -onset around 10/9/22  -ER visit on 10/12/22  -neg CXR  -given nebs  -no abx given  -neg COVID and flu tests  -unsure about fever but feels hot  O2 levels good  A lot of congestion  Feels like symptoms are worsening with more dyspnea and cough. Lungs feel \"full\"    Doesn't think she has asthma  Has used nebs from ER 2-3 x per day but doesn't like the way it makes her feel    Review of Systems   Respiratory: Positive for cough.      Objective    /85 (BP Location: Right arm, Patient Position: Sitting, Cuff Size: Adult Large)   Pulse 79   Temp 98.2  F (36.8  C) (Tympanic)   Resp 22   Ht 1.651 m (5' 5\")   Wt 90.7 kg (200 lb)   SpO2 98%   BMI 33.28 kg/m    Body mass index is 33.28 kg/m .  Physical Exam                       "

## 2022-10-21 NOTE — TELEPHONE ENCOUNTER
The pt is aware and scheduled for her upcoming appointment.   Gabriella Moreau on 10/21/2022 at 11:23 AM

## 2022-10-22 LAB
GAMMA INTERFERON BACKGROUND BLD IA-ACNC: 0.1 IU/ML
M TB IFN-G BLD-IMP: NEGATIVE
M TB IFN-G CD4+ BCKGRND COR BLD-ACNC: 0.89 IU/ML
MITOGEN IGNF BCKGRD COR BLD-ACNC: 0 IU/ML
MITOGEN IGNF BCKGRD COR BLD-ACNC: 0.01 IU/ML
QUANTIFERON MITOGEN: 0.99 IU/ML
QUANTIFERON NIL TUBE: 0.1 IU/ML
QUANTIFERON TB1 TUBE: 0.11 IU/ML
QUANTIFERON TB2 TUBE: 0.1

## 2022-10-24 ENCOUNTER — MYC MEDICAL ADVICE (OUTPATIENT)
Dept: PEDIATRICS | Facility: CLINIC | Age: 82
End: 2022-10-24

## 2022-10-25 ENCOUNTER — OFFICE VISIT (OUTPATIENT)
Dept: PEDIATRICS | Facility: CLINIC | Age: 82
End: 2022-10-25
Payer: COMMERCIAL

## 2022-10-25 VITALS
OXYGEN SATURATION: 95 % | TEMPERATURE: 97.6 F | DIASTOLIC BLOOD PRESSURE: 72 MMHG | WEIGHT: 203.1 LBS | RESPIRATION RATE: 20 BRPM | HEART RATE: 100 BPM | BODY MASS INDEX: 33.8 KG/M2 | SYSTOLIC BLOOD PRESSURE: 110 MMHG

## 2022-10-25 DIAGNOSIS — J40 BRONCHITIS: Primary | ICD-10-CM

## 2022-10-25 DIAGNOSIS — D84.9 IMMUNOCOMPROMISED (H): ICD-10-CM

## 2022-10-25 PROCEDURE — 99214 OFFICE O/P EST MOD 30 MIN: CPT | Performed by: PHYSICIAN ASSISTANT

## 2022-10-25 RX ORDER — PREDNISONE 20 MG/1
20 TABLET ORAL ONCE
Status: COMPLETED | OUTPATIENT
Start: 2022-10-25 | End: 2022-10-25

## 2022-10-25 RX ORDER — CODEINE PHOSPHATE AND GUAIFENESIN 10; 100 MG/5ML; MG/5ML
1 SOLUTION ORAL
Qty: 60 ML | Refills: 0 | Status: SHIPPED | OUTPATIENT
Start: 2022-10-25 | End: 2023-01-08

## 2022-10-25 RX ORDER — PREDNISONE 20 MG/1
TABLET ORAL
Qty: 20 TABLET | Refills: 0 | Status: SHIPPED | OUTPATIENT
Start: 2022-10-25 | End: 2023-01-08

## 2022-10-25 RX ADMIN — PREDNISONE 20 MG: 20 TABLET ORAL at 13:52

## 2022-10-25 ASSESSMENT — PAIN SCALES - GENERAL: PAINLEVEL: MILD PAIN (2)

## 2022-10-25 NOTE — PROGRESS NOTES
Assessment & Plan     Bronchitis  Discontinue levaquin. Begin augmentin as directed. Hold methotrexate this week.   Begin oral prednisone taper tomorrow. Administer 20 mg pred in clinic today. Continue with albuterol four times daily.   cheratussin AC at bedtime PRN.   Close follow up in three days if no improvement.   - predniSONE (DELTASONE) 20 MG tablet; Take 3 tabs by mouth daily x 3 days, then 2 tabs daily x 3 days, then 1 tab daily x 3 days, then 1/2 tab daily x 3 days.  - amoxicillin-clavulanate (AUGMENTIN) 875-125 MG tablet; Take 1 tablet by mouth 2 times daily  - predniSONE (DELTASONE) tablet 20 mg  - guaiFENesin-codeine (CHERATUSSIN AC) 100-10 MG/5ML solution; Take 5 mLs by mouth nightly as needed for cough    Immunocompromised (H)    [facility] MIAN Miles is a 82 year old presenting for the following health issues:  No chief complaint on file.      HPI     Acute Illness  Acute illness concerns: cough not improving   Onset/Duration: x 3 weeks   Symptoms:  Fever: YES- 102 (highest) on 10/12/2022  Chills/Sweats: YES- sweats   Headache (location?): YES- whole head   Sinus Pressure: No  Conjunctivitis:  No  Ear Pain: no  Rhinorrhea: YES  Congestion: YES  Sore Throat: No  Cough: YES-productive of yellow sputum  Wheeze: YES  Decreased Appetite: YES  Nausea: No  Vomiting: No  Diarrhea: No  Dysuria/Freq.: No  Dysuria or Hematuria: No  Fatigue/Achiness: YES  Sick/Strep Exposure: No  Therapies tried and outcome: tessalon, inhaler    Patient seen initially on 10/12 in ED. Labs and CXR NEGATIVE. Viral infection.   Returned on 10/21 in clinic. CXR NEGATIVE. Patient placed on levaquin 750 daily x 5 days. Did not take 5th dose today as her entire body hurt from medication.     She has been using albuterol and duonebs with no relief. duonebs make her lightheaded. Tessalon perles with no relief. mucinex with relief.     Review of Systems   Constitutional,  HEENT, cardiovascular, pulmonary, gi and gu systems are negative, except as otherwise noted.      Objective    /72 (BP Location: Right arm, Patient Position: Sitting, Cuff Size: Adult Large)   Pulse 100   Temp 97.6  F (36.4  C) (Temporal)   Resp 20   Wt 92.1 kg (203 lb 1.6 oz)   SpO2 95%   BMI 33.80 kg/m    Body mass index is 33.8 kg/m .  Physical Exam   GENERAL: alert and no distress  EYES: Eyes grossly normal to inspection, PERRL and conjunctivae and sclerae normal  HENT: ear canals and TM's normal, nose and mouth without ulcers or lesions; pnd present  NECK: no adenopathy  RESP: moderately diminished breath sounds with rhonchi throughout  CV: regular rate and rhythm, normal S1 S2, no S3 or S4    No results found for any visits on 10/25/22.

## 2022-10-25 NOTE — PATIENT INSTRUCTIONS
Begin prednisone as we discussed. Take in the AM with food. START TOMORROW.   Albuterol four times daily   Begin antibiotics twice daily.   Hold methotrexate this week.   Push out Mohs procedure  Cough syrup at night  Follow up in one week.   If you are not improving, [phone] and I will see you on Friday.

## 2022-10-25 NOTE — TELEPHONE ENCOUNTER
I think she should be seen again TODAY in-clinic for evaluation if not feeling better.  Pt is [facility] pt so could be seen here or [facility].  Ariane Hu, APRN, CNP

## 2022-10-25 NOTE — TELEPHONE ENCOUNTER
"Called and spoke with patient. Patient reports feeling \"pretty crappy, nothing is getting any better. I'm coughing like crazy, can't seem to stop it, still having pains in my side and my back. Don't feel good at all. Today is last day of antibiotics. Last night my whole body ached like crazy, could not get comfortable\".     Patient reports no fever, but does feel hot and sweaty at times. Head hurts. Has SOB sometimes, uses the inhaler but this starts her cough again. Patient is requesting something to help stop the cough, not tessalon, wants cough syrup to coat throat. Patient is not using neb treatments at this time.     Please review and advise.     Dylan CEBALLOS RN 10/25/2022 at 8:32 AM    "

## 2022-11-01 ENCOUNTER — OFFICE VISIT (OUTPATIENT)
Dept: PEDIATRICS | Facility: CLINIC | Age: 82
End: 2022-11-01
Payer: COMMERCIAL

## 2022-11-01 ENCOUNTER — VIRTUAL VISIT (OUTPATIENT)
Dept: RHEUMATOLOGY | Facility: CLINIC | Age: 82
End: 2022-11-01
Payer: COMMERCIAL

## 2022-11-01 VITALS
RESPIRATION RATE: 18 BRPM | TEMPERATURE: 98.5 F | BODY MASS INDEX: 34.48 KG/M2 | OXYGEN SATURATION: 95 % | WEIGHT: 207.2 LBS | DIASTOLIC BLOOD PRESSURE: 64 MMHG | SYSTOLIC BLOOD PRESSURE: 114 MMHG | HEART RATE: 82 BPM

## 2022-11-01 DIAGNOSIS — M81.0 OSTEOPOROSIS, UNSPECIFIED OSTEOPOROSIS TYPE, UNSPECIFIED PATHOLOGICAL FRACTURE PRESENCE: ICD-10-CM

## 2022-11-01 DIAGNOSIS — Z79.899 HIGH RISK MEDICATION USE: ICD-10-CM

## 2022-11-01 DIAGNOSIS — M05.79 RHEUMATOID ARTHRITIS INVOLVING MULTIPLE SITES WITH POSITIVE RHEUMATOID FACTOR (H): Primary | ICD-10-CM

## 2022-11-01 DIAGNOSIS — G89.29 CHRONIC MIDLINE THORACIC BACK PAIN: ICD-10-CM

## 2022-11-01 DIAGNOSIS — M54.6 CHRONIC MIDLINE THORACIC BACK PAIN: ICD-10-CM

## 2022-11-01 DIAGNOSIS — J40 BRONCHITIS: Primary | ICD-10-CM

## 2022-11-01 PROCEDURE — 99213 OFFICE O/P EST LOW 20 MIN: CPT | Performed by: PHYSICIAN ASSISTANT

## 2022-11-01 PROCEDURE — 99215 OFFICE O/P EST HI 40 MIN: CPT | Mod: 95 | Performed by: INTERNAL MEDICINE

## 2022-11-01 ASSESSMENT — PAIN SCALES - GENERAL: PAINLEVEL: NO PAIN (0)

## 2022-11-01 NOTE — PATIENT INSTRUCTIONS
RHEUMATOLOGY    Dr. Nico Byers    [facility]  [address] [place] [address]  [place] [address]  Phone number: [phone]  Fax number: [phone]    You may schedule your FLU shot by calling [phone] or if you would like to get your shot at a [place] pharmacy you may schedule online at [email or web address].    Thank you for choosing [facility]!    Samantha Morales CMA Rheumatology

## 2022-11-01 NOTE — PROGRESS NOTES
Ginger Marshall  is a 82 year old year old who is being evaluated via a billable video visit.      How would you like to obtain your AVS? MyChart  If the video visit is dropped, the invitation should be resent by: Text to cell phone: [phone]   Will anyone else be joining your video visit? No     Rheumatology Video Visit      Ginger Marshall MRN# [id number]   YOB: 1940 Age: 82 year old      Date of visit: 11/01/22   PCP: [facility]     Chief Complaint   Patient presents with:  Rheumatoid Arthritis    Assessment and Plan     1. Seropositive Erosive Rheumatoid Arthritis ( [2009], CCP >100 [2009]; hx of rheumatoid nodule by 6/14/2011 pathology): Previously failed remicade (staph infection during therapy, but was immediately after a joint injection), orencia (migraines), HCQ (ineffective), Xeljanz (partially effective).  Sulfa allergy.  Currently on methotrexate 25 mg SQ once weekly (dose reduction in the past resulted in more symptoms), leucovorin 5 mg weekly (resolved nasal sore that didn't improve with higher daily folic acid doses), and Rinvoq 15mg daily.  RA well controlled since changing to Rinvoq.  Chronic illness, stable.    - Continue methotrexate 25mg SQ once weekly (Rasuvo)  - Continue leucovorin 5 mg every 7 days, 24 hours after MTX dose.   - Continue Rinvoq 15 mg daily  - Fasting labs in 3 months: CBC, Creatinine, Hepatic Panel, ESR, CRP, lipid panel    High risk medication requiring intensive toxicity monitoring at least quarterly: labs ordered include CBC, Creatinine, Hepatic panel to monitor for cytopenia and hepatotoxicity; checking creatinine as it affects clearance of medication.      2. 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  Doing okay at this time.     3. Right hip pain: Status post WALTER twice in the past. Followed by [facility] orth[facility].     4. 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 Michele who is now at [facility].  Has been seen at [facility] and [place] [facility].  She previously reported that a TENS unit was helpful, but today says that it was not.  She does not want additional back injections and she does not want surgery. She is not doing exercises because she says it aggravates her back. Therefore, I advised that she follow-up with her PCP for the chronic low back pain, or discuss further with her pain clinic.      5.  Thoracic back pain: Degenerative in nature.  Reviewed the 6/1/2021 CT chest with the patient, with the purposes of evaluating the thoracic spine where multilevel degenerative changes were seen.  I advised physical therapy and she is in agreement.  Chronic illness, progressive, Independent interpretation of test completed by another healthcare professional (6/1/2021 CT chest, for the purpose of looking at the thoracic spine)  - Physical therapy referral     5. Osteoporosis: was previously managed by endocrinology and she received reclast once in 2013, 2014, 2016. 12/12/2018 DEXA ordered by Dr. Vázquez showed osteoporosis.  Repeat DEXA on 2/2/2022 showed osteoporosis; no significant change of the hips; forearm osteoporosis but no previous evaluation of the forearm for comparison.  Reclast was restarted after sufficient drug holiday, with the first dose on 3/18/2021; again received in 3/21/2022; in the next dose to be on 3/21/2023.  Plan to recheck DEXA in February 2024.  Chronic illness, stable.    - Reclast once yearly; next due on 3/21/2023  - Continue vitamin D 1000 IU daily  - Continue calcium 1200mg daily  - Labs in 3 months: Calcium, vitamin D              6. Left 1st toe pain, history: 2 episodes of sudden onset left toe pain followed by diffuse left foot pain that made ambulation difficult.  Also with nodules over both Achilles tendons and the right elbow that are either rheumatoid nodules or  tophi.  She reports having history of gout decades ago.  Denies ever being on colchicine or allopurinol.  Discussed colchicine to use if suspected gout flare occurs.  No flares since last seen so has not used colchicine.  - Colchicine: (MITIGARE) 0.6 MG capsule; Take 2 capsules (1.2 mg) by mouth once for 1 dose at the onset of a gout flare, followed by 1 capsule (0.6mg) 1 hour later.      7. Chronic pain syndrome: Documented here for historical significance only.   Management per pain clinic and/or PCP    8.  Vaccinations: Vaccinations reviewed with Ms. Marshall.  Risks and benefits of vaccinations were discussed.    - Influenza: encouraged yearly vaccination, and to hold methotrexate for 2 weeks afterward  - [id number]: up to date  - [id number]: up to date  - Shingrix: Up to date   - COVID-19: Advised updating, and to hold methotrexate and Rinvoq for 2 weeks afterward     Total minutes spent in evaluation with patient, documentation, , and review of pertinent studies and chart notes: 26     Ms. Marshall verbalized agreement with and understanding of the rational for the diagnosis and treatment plan.  All questions were answered to best of my ability and the patient's satisfaction. Ms. Marshall was advised to contact the clinic with any questions that may arise after the clinic visit.      Thank you for involving me in the care of the patient    Return to clinic: 3-4 months    HPI   Ginger Marshall is a 82 year old female with a history of multiple foot surgeries, hand tendon transfers, MCP replacements (most recent being in August 2015), bilateral TKA, right WALTER, left distal radius fracture history, gout, s/p lumbar fusion, osteoporosis and seropositive (RF+, CCP+) erosive rheumatoid arthritis who presents for follow-up of rheumatoid arthritis.      Today, 11/1/2022: Had bronchitis that resolved with antibiotics.  RA controlled; methotrexate and Rinvoq are effective, per patient.  Morning stiffness for no more  than 10 minutes.  No gelling.  Thoracic back pain that is worse with activity and improves with rest; this is different from the lower back pain she says; would like to try physical therapy.  Taking calcium and vitamin D.      Denies fevers, chills, nausea, vomiting, constipation, diarrhea. No abdominal pain. No chest pain/pressure, palpitations, or shortness of breath. No oral or nasal sores.   No rash. No LE swelling.     Tobacco: quit in 1999  EtOH: 1 glass of wine every month at most  Drugs: None  Occupation: , retired     ROS   12 point review of system was completed and negative except as noted in the HPI     Active Problem List     Patient Active Problem List   Diagnosis     Rheumatoid arthritis involving right hand with positive rheumatoid factor (H)     History of colonic polyps     Multinodular goiter     CARDIOVASCULAR SCREENING; LDL GOAL LESS THAN 130     Pulmonary nodule     PSEUDOPHAKIA OU     PVD (POSTERIOR VITREOUS DETACHMENT) OU     PXF (PSEUDOEXFOLIATION OF LENS CAPSULE) OD     GERD (gastroesophageal reflux disease)     Hyperlipidemia LDL goal <100     Advance Care Planning     Neuropathy     SHERRY (obstructive sleep apnea)-severe (AHI 35)     zEncounter for counseling     Anemia of chronic disease     Osteoporosis     Cornea guttata, ou     Conjunctival concretions     Stenosis, spinal, lumbar     Chronic pain     Class 1 obesity due to excess calories with serious comorbidity and body mass index (BMI) of 31.0 to 31.9 in adult     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     MGD (meibomian gland dysfunction)     Blepharitis of both eyes     Personal history of healed osteoporosis fracture     Iron malabsorption     Hyperglycemia     Chronic bilateral low back pain without sciatica     Allergic state, subsequent encounter     Anxiety     History of depression     DDD (degenerative disc disease), lumbar     Chronic right shoulder pain     Moderate episode of recurrent  major depressive disorder (H)     Rheumatic mitral stenosis     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     History of bisphosphonate therapy     Morbid obesity (H)     Immunocompromised (H)     Past Medical History     Past Medical History:   Diagnosis Date     Acute posthemorrhagic anemia 10/13/2012     Closed fracture of multiple ribs of left side, initial encounter 2019     COPD (chronic obstructive pulmonary disease) (H)     no longer occurring     Ex-smoker 1999     Gastroenteritis 2020    Gastroenteritis with norovirus     Herniated nucleus pulposus, L3-4 3/1/2017     Hip joint replacement by other means 07/10/2008     History of blood transfusion      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     History of total knee replacement 2009     Menopause 1989    late 40's     Migraine 2014    resolved     Multinodular goiter      Other chronic pain     joints     Pelvic fracture (H) 2014     Rheumatic mitral stenosis      Rheumatoid arteritis (H)      S/P lumbar fusion 2017     Sleep apnea      Vitamin B12 deficiency      Past Surgical History     Past Surgical History:   Procedure Laterality Date     ABDOMEN SURGERY      c sections     ARTHROSCOPY KNEE Left      ARTHROSCOPY KNEE RT/LT  2006    left     BACK SURGERY  2013    disc     BIOPSY BREAST       BREAST SURGERY      lumpectomy      BREAST SURGERY      lumpectomy     CATARACT EXTRACTION Bilateral      CATARACT IOL, RT/LT Bilateral 2010 aproximately      SECTION  1966      SECTION  1972     COLONOSCOPY  2009,     COLONOSCOPY       FINGER SURGERY Right 2019    Procedure: DISTAL ULNA RESECTION, RIGHT MIDDLE SILASTIC METACARPALPHALANGEAL EXCHANGE AND RIGHT ELBOW NODULE EXCISION.;  Surgeon: Hilario Redmond MD;  Location: [facility] OR;  Service: Orthopedics     FOOT SURGERY Left      GYN SURGERY  [age]     HAND SURGERY Right       "HAND SURGERY Right      HC ESOPH/GAS REFLUX TEST W NASAL IMPED >1 HR  02/01/2012    Procedure:ESOPHAGEAL IMPEDENCE FUNCTION TEST WITH 24 HOUR PH GREATER THAN 1 HOUR; Surgeon:KAYKAY JULIO; Location:[facility]     IR LUMBAR EPIDURAL STEROID INJECTION       IR TRANSLAMINAR EPIDURAL LUMBAR INJ INCL IMAGING  05/02/2012    LESI L5-S1 at [facility]     LUMBAR FUSION       OPTICAL TRACKING SYSTEM FUSION POSTERIOR SPINE LUMBAR N/A 04/03/2017    Procedure: OPTICAL TRACKING SYSTEM FUSION SPINE POSTERIOR LUMBAR ONE LEVEL;  Surgeon: Anthony Michele MD;  Location: [facility] OR     ORTHOPEDIC SURGERY  1991    left foot surgery     ORTHOPEDIC SURGERY  1995    R MCP surgery     ORTHOPEDIC SURGERY  2007    right knee total replacement     TOTAL HIP ARTHROPLASTY Right      TOTAL KNEE ARTHROPLASTY Left      TOTAL KNEE ARTHROPLASTY Right      ZZC ANESTH,TOTAL HIP ARTHROPLASTY  2010    Rt hip     ZZC HAND/FINGER SURGERY UNLISTED  11/2005    right hand     ZZC TOTAL KNEE ARTHROPLASTY  2006    left     Allergy     Allergies   Allergen Reactions     Abatacept Other (See Comments)     Severe headaches  Migraine     Celebrex [Celecoxib]      Ineffective     Celecoxib Unknown and Nausea     Ethanol      Antihistamines     Levaquin [Levofloxacin] Fatigue     Severe body pain     Orencia [Abatacept]      Headache     Septra [Bactrim]      Sulfa Drugs Nausea and Vomiting     \"deathly ill\"  Allergic to everything with Sulfa in it.     Sulfamethoxazole-Trimethoprim Nausea and Nausea and Vomiting     Tramadol Other (See Comments)     Headache  Migraine headache     Valdecoxib Unknown and Nausea     Made me \"very very ill\", might of been \"cramping\"     Adhesive Tape Rash     Plastic tape  Plastic tapes     Antihistamines, Chlorpheniramine-Type  [Alkylamines] Anxiety and Other (See Comments)     Current Medication List     Current Outpatient Medications   Medication Sig     acetaminophen (TYLENOL) 500 MG tablet Take 1-2 tablets (500-1,000 mg) by " mouth 2 times daily     albuterol (PROAIR HFA/PROVENTIL HFA/VENTOLIN HFA) 108 (90 Base) MCG/ACT inhaler Inhale 2 puffs into the lungs every 6 hours     atorvastatin (LIPITOR) 40 MG tablet Take 1 tablet (40 mg) by mouth daily     Bioflavonoid Products (VITAMIN C) CHEW      busPIRone (BUSPAR) 10 MG tablet TAKE 1 TABLET (10 MG) BY MOUTH 2 TIMES DAILY     Cholecalciferol (VITAMIN D-3 PO) Take 1,000 Units by mouth 2 times daily     desvenlafaxine (PRISTIQ) 100 MG 24 hr tablet Take 1 tablet (100 mg) by mouth daily     guaiFENesin (MUCINEX) 600 MG 12 hr tablet Take 1 tablet (600 mg) by mouth 2 times daily     hydrocortisone, Perianal, (HYDROCORTISONE) 2.5 % cream Place rectally 2 times daily as needed for hemorrhoids     leucovorin (WELLCOVORIN) 5 MG tablet Take 1 tablet (5 mg) by mouth every 7 days . Take 24 hours after taking Methotrexate each week.     loratadine (CLARITIN) 10 MG tablet Take 10 mg by mouth daily     Methotrexate, PF, (RASUVO) 25 MG/0.5ML autoinjector Inject 0.5 mLs (25 mg) Subcutaneous every 7 days . Hold for signs of infection, and seek medical attention.     pantoprazole (PROTONIX) 40 MG EC tablet TAKE ONE TABLET BY MOUTH ONCE DAILY 30-60 MINUTES BEFORE A MEAL     predniSONE (DELTASONE) 20 MG tablet Take 3 tabs by mouth daily x 3 days, then 2 tabs daily x 3 days, then 1 tab daily x 3 days, then 1/2 tab daily x 3 days.     sucralfate (CARAFATE) 1 GM tablet TAKE ONE TABLET BY MOUTH FOUR TIMES A DAY AS NEEDED HEATBURN     Upadacitinib ER (RINVOQ) 15 MG TB24 Take 15 mg by mouth daily     amoxicillin-clavulanate (AUGMENTIN) 875-125 MG tablet Take 1 tablet by mouth 2 times daily     guaiFENesin-codeine (CHERATUSSIN AC) 100-10 MG/5ML solution Take 5 mLs by mouth nightly as needed for cough     No current facility-administered medications for this visit.     Social History   See HPI    Family History     Family History   Problem Relation Age of Onset     Arthritis Mother      Alzheimer Disease Mother       "Hyperlipidemia Mother      Osteoporosis Mother      Heart Disease Father         MI ( from this)     Alcohol/Drug Father      Arthritis Sister      Hypertension Sister      Other Cancer Sister         Luekemia     Cancer Son      Diabetes Son         type 1     Neurologic Disorder Sister         Schizophrenic     Hypertension Sister      Hyperlipidemia Sister      Mental Illness Sister      Diabetes Son      Other Cancer Son      Substance Abuse Son      Glaucoma Daughter      Diabetes Other         type 2     Hyperlipidemia Other      No change in family history since the previous clinic visit.    Physical Exam     Temp Readings from Last 3 Encounters:   10/25/22 97.6  F (36.4  C) (Temporal)   10/21/22 98.2  F (36.8  C) (Tympanic)   10/12/22 97.6  F (36.4  C) (Temporal)     BP Readings from Last 5 Encounters:   10/25/22 110/72   10/21/22 119/85   10/12/22 (!) 143/84   22 114/68   22 110/70     Pulse Readings from Last 1 Encounters:   10/25/22 100     Resp Readings from Last 1 Encounters:   10/25/22 20     Estimated body mass index is 33.8 kg/m  as calculated from the following:    Height as of 10/21/22: 1.651 m (5' 5\").    Weight as of 10/25/22: 92.1 kg (203 lb 1.6 oz).      GEN: NAD.   HEENT:  Anicteric, noninjected sclera. No obvious external lesions of the ear and nose. Hearing intact.  PULM: No increased work of breathing  MSK:  Hands and wrists without swelling.   SKIN: No rash or jaundice seen  PSYCH: Alert. Appropriate.        Labs     CBC  Recent Labs   Lab Test 10/20/22  1256 10/12/22  1212 22  1112 21  1054 05/10/21  1023 21  1019 20  1028   WBC 5.9 5.4 5.4   < > 5.0 5.7 5.7   RBC 3.75* 3.85 3.62*   < > 3.39* 3.81 3.91   HGB 11.4* 11.7 11.3*   < > 11.3* 12.5 12.0   HCT 37.3 37.7 35.8   < > 35.6 38.9 37.8    98 99   < > 105* 102* 97   RDW 15.3* 15.3* 14.9   < > 14.3 15.2* 16.1*    320 341   < > 333 335 361   MCH 30.4 30.4 31.2   < > 33.3* 32.8 30.7 "   MCHC 30.6* 31.0* 31.6   < > 31.7 32.1 31.7   NEUTROPHIL 62 57 58   < > 57.7 65.8 60.5   LYMPH 18 16 23   < > 18.5 17.0 20.4   MONOCYTE 15 21 16   < > 17.3 12.1 12.8   EOSINOPHIL 4 5 4   < > 6.3 4.9 6.1   BASOPHIL 0 0 0   < > 0.2 0.2 0.2   ANEU  --   --   --   --  2.9 3.8 3.5   ALYM  --   --   --   --  0.9 1.0 1.2   MARYURI  --   --   --   --  0.9 0.7 0.7   AEOS  --   --   --   --  0.3 0.3 0.4   ABAS  --   --   --   --  0.0 0.0 0.0   ANEUTAUTO 3.6 3.1 3.1   < >  --   --   --    ALYMPAUTO 1.1 0.9 1.2   < >  --   --   --    AMONOAUTO 0.9 1.1 0.8   < >  --   --   --    AEOSAUTO 0.3 0.3 0.2   < >  --   --   --    ABSBASO 0.0 0.0 0.0   < >  --   --   --     < > = values in this interval not displayed.     CMP  Recent Labs   Lab Test 10/20/22  1256 10/12/22  1212 08/12/22  1109 07/12/22  1112 04/29/22  1000 03/21/22  1041 08/18/21  1054 05/10/21  1023 03/18/21  1350 02/16/21  1019 12/18/20  0923 11/16/20  1028 05/22/20  1026 03/27/20  1413   NA  --  140 141  --   --   --   --   --   --   --   --   --   --  140   POTASSIUM  --  4.1 3.9  --   --   --   --   --   --   --   --   --   --  3.4   CHLORIDE  --  103 111*  --   --   --   --   --   --   --   --   --   --  112*   CO2  --  25 21  --   --   --   --   --   --   --   --   --   --  24   ANIONGAP  --  12 9  --   --   --   --   --   --   --   --   --   --  4   GLC  --  99 105*  --   --   --   --   --   --   --   --   --   --  98   BUN  --  8.3 13  --   --   --   --   --   --   --   --   --   --  18   CR 0.61 0.64 0.68 0.64   < > 0.69   < > 0.59  --  0.62  --  0.60   < > 0.57   GFRESTIMATED 90 89 88 89   < > 88   < > 87  --  86  --  87   < > 89   GFRESTBLACK  --   --   --   --   --   --   --  >90  --  >90  --  >90   < > >90   BRANDEE  --  9.4 9.0  --   --  9.3   < >  --    < >  --    < >  --   --  9.1   BILITOTAL 0.4 0.4  --  0.3   < >  --    < > 0.3  --  0.4  --  0.4   < >  --    ALBUMIN 4.1 4.1  --  4.0   < >  --    < > 3.6  --  4.2  --  4.1   < >  --    PROTTOTAL 7.8 7.5  --   7.4   < >  --    < > 7.0  --  8.0  --  7.6   < >  --    ALKPHOS 46 38  --  41   < >  --    < > 45  --  44  --  47   < >  --    AST 26 32  --  24   < >  --    < > 22  --  21  --  25   < >  --    ALT 38 37*  --  32   < >  --    < > 36  --  34  --  36   < >  --     < > = values in this interval not displayed.     Calcium/VitaminD  Recent Labs   Lab Test 10/12/22  1212 08/12/22  1109 03/21/22  1041 10/13/21  1447 08/18/21  1054 03/18/21  1350 12/18/20  0923   BRANDEE 9.4 9.0 9.3 9.0 9.5   < > 9.6   VITDT  --   --   --  62 63  --  48    < > = values in this interval not displayed.     ESR/CRP  Recent Labs   Lab Test 10/20/22  1256 07/12/22  1112 04/29/22  1000   SED 31* 15 15   CRP 22.90* <3.00 <2.9     Lipid Panel  Recent Labs   Lab Test 12/08/21  1053 11/27/20  1043 12/19/19  0957   CHOL 184 188 166   TRIG 101 142 113   HDL 77 89 89   LDL 87 71 54   NHDL 107 99 77     Hepatitis B  Recent Labs   Lab Test 10/20/22  1256 09/15/15  1159   AUSAB  --  0.11   HBCAB Nonreactive Nonreactive   HEPBANG Nonreactive Nonreactive     Hepatitis C  Recent Labs   Lab Test 10/20/22  1256 09/15/15  1159   HCVAB Nonreactive Nonreactive   Assay performance characteristics have not been established for newborns,   infants, and children       Tuberculosis Screening  Recent Labs   Lab Test 10/20/22  1256 08/18/21  1054 05/07/18  1033 09/15/15  1200   TBRES Negative Negative  --   --    TBRSLT  --   --  Negative Negative   TBAGN  --   --  0.00 0.00     Immunization History     Immunization History   Administered Date(s) Administered     COVID-19,PF,Pfizer (12+ Yrs) 02/09/2021, 03/03/2021     FLU 6-35 months 10/24/2006, 11/14/2007, 10/22/2008, 10/21/2009     Flu, Unspecified 10/20/2013     Influenza (High Dose) 3 valent vaccine 10/28/2013, 09/23/2014, 11/11/2015, 09/23/2016, 09/24/2019     Influenza (IIV3) PF 10/12/1999, 10/26/2001, 10/19/2002, 10/30/2003, 10/28/2004, 10/21/2005, 10/26/2007, 10/21/2009, 10/05/2011, 10/13/2012     Influenza Vaccine  IM > 6 months Valent IIV4 (Alfuria,Fluzone) 09/24/2020     Influenza Vaccine Im 4yrs+ 4 Valent CCIIV4 09/28/2017, 09/27/2018     Influenza, Quad, High Dose, Pf, 65yr+ (Fluzone HD) 10/07/2021     Mantoux Tuberculin Skin Test 11/03/2006     Pneumo Conj 13-V (2010&after) 07/29/2015     Pneumococcal 23 valent 06/26/2000, 10/26/2001, 06/01/2007, 06/02/2010     TD (ADULT, 7+) 12/10/2008, 07/20/2009     Tdap (Adult) Unspecified Formulation 12/12/2018     Zoster vaccine recombinant adjuvanted (SHINGRIX) 12/12/2018, 02/14/2019          Chart documentation done in part with Dragon Voice recognition Software. Although reviewed after completion, some word and grammatical error may remain.      Video-Visit Details    Type of service:  Video Visit    Video Start Time: 10:48 AM    Video End Time: 11:04 AM    Originating Location (pt. Location): Home, [place]    Distant Location (provider location):  Off-site, [place]    Platform used for Video Visit: Roxanne Byers MD

## 2022-11-01 NOTE — PROGRESS NOTES
Assessment & Plan     Bronchitis  Improved. Discontinue inhalers.     Follow up PRN.     MARYSOL [facility] MIAN Miles is a 82 year old, presenting for the following health issues:  RECHECK      HPI     Concern - cough  Onset: over 1 month  Description: loose productive cough  Intensity: mild, moderate  Progression of Symptoms:  improving  Accompanying Signs & Symptoms: none  Previous history of similar problem: h/o bronchitis  Precipitating factors:        Worsened by: none  Alleviating factors:        Improved by: ABX, steroids  Therapies tried and outcome: ABX and steroids    Review of Systems   Constitutional, HEENT, cardiovascular, pulmonary, gi and gu systems are negative, except as otherwise noted.      Objective    /64 (BP Location: Right arm, Patient Position: Sitting, Cuff Size: Adult Large)   Pulse 82   Temp 98.5  F (36.9  C) (Tympanic)   Resp 18   Wt 94 kg (207 lb 3.2 oz)   SpO2 95%   BMI 34.48 kg/m    Body mass index is 34.48 kg/m .  Physical Exam   GENERAL: healthy, alert and no distress  NECK: no adenopathy  RESP: lungs clear to auscultation - no rales, rhonchi or wheezes  CV: regular rate and rhythm, normal S1 S2, no S3 or S4    No results found for any visits on 11/01/22.

## 2022-11-08 ENCOUNTER — THERAPY VISIT (OUTPATIENT)
Dept: PHYSICAL THERAPY | Facility: CLINIC | Age: 82
End: 2022-11-08
Attending: INTERNAL MEDICINE
Payer: COMMERCIAL

## 2022-11-08 DIAGNOSIS — M54.6 CHRONIC MIDLINE THORACIC BACK PAIN: ICD-10-CM

## 2022-11-08 DIAGNOSIS — G89.29 CHRONIC MIDLINE THORACIC BACK PAIN: ICD-10-CM

## 2022-11-08 PROCEDURE — 97110 THERAPEUTIC EXERCISES: CPT | Mod: GP

## 2022-11-08 PROCEDURE — 97161 PT EVAL LOW COMPLEX 20 MIN: CPT | Mod: GP

## 2022-11-08 NOTE — PROGRESS NOTES
[facility]    OUTPATIENT Physical Therapy ORTHOPEDIC EVALUATION  PLAN OF TREATMENT FOR OUTPATIENT REHABILITATION  (COMPLETE FOR INITIAL CLAIMS ONLY)  Patient's Last Name, First Name, M.I.  YOB: 1940  Ginger Marshall    Provider s Name:  [facility]   Medical Record No.  [id number]   Start of Care Date:  11/08/22   Onset Date:   11/01/22 (MD order)   Treatment Diagnosis:  thoracic back pain Medical Diagnosis:  Chronic midline thoracic back pain       Goals:     11/08/22 0500   Body Part   Goals listed below are for thoracic back   Goal #1   Goal #1 standing   Previous Functional Level No restrictions   Current Functional Level Minutes patient can stand   Performance level 15 w/ pain up to 8/10   STG Target Performance Minutes patient will be able to stand   Performance level 20 w/ pain no greater than 4/10   Rationale for housekeeping tasks such as vacuuming, bed making, mowing, gardening;for personal hygiene;for meal preparation;for safe household ambulation;for safe community ambulation   Due date 12/13/22   LTG Target Performance Minutes patient will be able to stand   Performance Level 40 min w/ pain no greater than 2/10   Rationale for housekeeping tasks such as vacuuming, bed making, mowing;for personal hygiene;for meal preparation;for safe household ambulation;for safe community ambulation   Due date 01/31/23       Therapy Frequency:  1x/week  Predicted Duration of Therapy Intervention:  4 weeks tapering to 2x/mo for 2 months    Brianna Nguyễn PT                 I CERTIFY THE NEED FOR THESE SERVICES FURNISHED UNDER        THIS PLAN OF TREATMENT AND WHILE UNDER MY CARE     (Physician attestation of this document indicates review and certification of the therapy plan).                     Certification Date From:  11/08/22   Certification Date To:   01/31/23    Referring Provider:  Nico Byers    Initial Assessment        See Epic Evaluation SOC Date: 11/08/22

## 2022-11-08 NOTE — PROGRESS NOTES
"Physical Therapy Initial Evaluation  Therapist Impression: Ginger is a 82 year old year old female referred to physical therapy by Dr. Nico Byers for treatment of chronic midline thoracic back pain. Subjective history and objective findings are consistent with diagnosis. Due to these impairments, patient has difficulty with standing for extended periods. Patient will benefit from skilled PT to address impairments/limitations in order to reach patient's goals, facilitate return to prior level of function, and maximize participation.    KEY FINDINGS:  1. TTP mid back musculature  2. Feels relief with thoracic extension  3. No myotome pattern - AMPARO UE and LE weakness    Subjective:  The history is provided by the patient. No  was used.   Therapist Generated HPI Evaluation  Problem details: Pt presents with upper back pain that has been going on for years. She also has LBP, has not had success in PT with this in the past. Her back is most bothersome with standing. Feels good sitting w/ ice. Would like to avoid injections. Has had lumbar fusion, thinks it was L3-4 or L3,4,5. Thinks this is part of the reason her upper back has hurt. For longer distances of walking uses a 4WW. No AD at home or for shorter distances.  Pain at worst: 10/10.  Goals: standing for longer distance with less pain.     Per MD note: \"reviewed the 6/1/2021 CT chest with the patient, with the purposes of evaluating the thoracic spine where multilevel degenerative changes were seen.\"  .         Type of problem:  Thoracic.    This is a chronic condition.  Condition occurred with:  Insidious onset.  Where condition occurred: for unknown reasons.  Patient reports pain:  Thoracic spine left and thoracic spine right.  Pain is described as aching and is intermittent.  Radiates to: also has LBP. Pain timing: feels best in the morning.  Since onset symptoms are unchanged.  Symptoms are exacerbated by standing  and relieved by ice " (sitting).  Special tests included:  CT scan.  Previous treatment includes chiropractic. There was none improvement following previous treatment.  Barriers include:  None as reported by patient.    Patient Health History         Pain is reported as 4/10 on pain scale.  General health as reported by patient is fair.  Pertinent medical history includes: history of fractures, implanted device, overweight, osteoarthritis and other (cold extremity, anxiety).   Red flags:  Severe headaches.     Surgeries include:  Orthopedic surgery (R WALTER, B TKA, lumbar fusion).    Current medications:  Anti-inflammatory.    Current occupation is retired.                                       Objective:  Standing Alignment:    Cervical/Thoracic:  Thoracic kyphosis increased and forward head  Shoulder/UE:  Rounded shoulders                             Lumbar/SI Evaluation  ROM:    AROM Lumbar:   Flexion:          WNL * mid and LBP  Ext:                    Min loss - relief   Side Bend:        Left:  Min loss    Right:  Min loss  Rotation:           Left:     Right:   Side Glide:        Left:     Right:           Lumbar Myotomes:  Lumbar myotomes: R hip flexion 4/5 (R WALTER), otherwise WNL.                                 Cervical/Thoracic Evaluation  Arom wnl cervical: WNL.   Arom wnl thoracic: * indicates pain.  AROM:    AROM Thoracic:    Flexion:               WNL  Extension:          Mod loss * upper thoracic  Rotation:            Left: mod loss, pain in sitting or sidelying     Right: mod loss, no pain in sitting, pain sidelying        Cervical Myotomes:  Cervical myotomes: no myotome pattern, AMPARO UE weakness in shoulder flexion and ABD.                        Cervical Palpation:  : TTP over spinous processes of upper T spie=ne.  Tenderness present at Left:    Rhomboids and Erector Spinae (mid Tspine AMPARO)  Tenderness present at Right:    Rhomboids and Erector Spinae                                                  General      ROS    Assessment/Plan:    Patient is a 82 year old female with thoracic complaints.    Patient has the following significant findings with corresponding treatment plan.                Diagnosis 1:  Thoracic back pain  Pain -  hot/cold therapy, US, electric stimulation, manual therapy, splint/taping/bracing/orthotics, self management, education, directional preference exercise and home program  Decreased ROM/flexibility - manual therapy, therapeutic exercise, therapeutic activity and home program  Decreased joint mobility - manual therapy, therapeutic exercise, therapeutic activity and home program  Decreased strength - therapeutic exercise, therapeutic activities and home program  Impaired balance - neuro re-education, therapeutic activities and home program  Decreased function - therapeutic activities and home program  Impaired posture - neuro re-education, therapeutic activities and home program    Therapy Evaluation Codes:   Cumulative Therapy Evaluation is: Low complexity.    Previous and current functional limitations:  (See Goal Flow Sheet for this information)    Short term and Long term goals: (See Goal Flow Sheet for this information)     Communication ability:  Patient appears to be able to clearly communicate and understand verbal and written communication and follow directions correctly.  Treatment Explanation - The following has been discussed with the patient:   RX ordered/plan of care  Anticipated outcomes  Possible risks and side effects  This patient would benefit from PT intervention to resume normal activities.   Rehab potential is good.    Frequency:  1 X week, once daily  Duration:  for 4 weeks tapering to 2 X a month over 8 weeks  Discharge Plan:  Achieve all LTG.  Independent in home treatment program.  Reach maximal therapeutic benefit.    Please refer to the daily flowsheet for treatment today, total treatment time and time spent performing 1:1 timed codes.

## 2022-11-14 ENCOUNTER — THERAPY VISIT (OUTPATIENT)
Dept: PHYSICAL THERAPY | Facility: CLINIC | Age: 82
End: 2022-11-14
Payer: COMMERCIAL

## 2022-11-14 DIAGNOSIS — M54.6 CHRONIC MIDLINE THORACIC BACK PAIN: Primary | ICD-10-CM

## 2022-11-14 DIAGNOSIS — G89.29 CHRONIC MIDLINE THORACIC BACK PAIN: Primary | ICD-10-CM

## 2022-11-14 PROCEDURE — 97140 MANUAL THERAPY 1/> REGIONS: CPT | Mod: GP | Performed by: PHYSICAL THERAPIST

## 2022-11-14 PROCEDURE — 97110 THERAPEUTIC EXERCISES: CPT | Mod: GP | Performed by: PHYSICAL THERAPIST

## 2022-11-16 ENCOUNTER — OFFICE VISIT (OUTPATIENT)
Dept: FAMILY MEDICINE | Facility: CLINIC | Age: 82
End: 2022-11-16
Payer: COMMERCIAL

## 2022-11-16 VITALS
TEMPERATURE: 97.6 F | SYSTOLIC BLOOD PRESSURE: 116 MMHG | HEIGHT: 65 IN | DIASTOLIC BLOOD PRESSURE: 78 MMHG | HEART RATE: 91 BPM | WEIGHT: 208.8 LBS | RESPIRATION RATE: 16 BRPM | BODY MASS INDEX: 34.79 KG/M2 | OXYGEN SATURATION: 96 %

## 2022-11-16 DIAGNOSIS — E66.01 MORBID OBESITY (H): ICD-10-CM

## 2022-11-16 DIAGNOSIS — F33.1 MODERATE EPISODE OF RECURRENT MAJOR DEPRESSIVE DISORDER (H): ICD-10-CM

## 2022-11-16 DIAGNOSIS — D84.9 IMMUNOCOMPROMISED (H): ICD-10-CM

## 2022-11-16 DIAGNOSIS — A60.04 HERPES SIMPLEX VULVOVAGINITIS: ICD-10-CM

## 2022-11-16 DIAGNOSIS — F41.9 ANXIETY: Primary | ICD-10-CM

## 2022-11-16 PROCEDURE — 90662 IIV NO PRSV INCREASED AG IM: CPT | Performed by: STUDENT IN AN ORGANIZED HEALTH CARE EDUCATION/TRAINING PROGRAM

## 2022-11-16 PROCEDURE — 99214 OFFICE O/P EST MOD 30 MIN: CPT | Mod: 25 | Performed by: STUDENT IN AN ORGANIZED HEALTH CARE EDUCATION/TRAINING PROGRAM

## 2022-11-16 PROCEDURE — 96127 BRIEF EMOTIONAL/BEHAV ASSMT: CPT | Performed by: STUDENT IN AN ORGANIZED HEALTH CARE EDUCATION/TRAINING PROGRAM

## 2022-11-16 PROCEDURE — G0008 ADMIN INFLUENZA VIRUS VAC: HCPCS | Performed by: STUDENT IN AN ORGANIZED HEALTH CARE EDUCATION/TRAINING PROGRAM

## 2022-11-16 RX ORDER — CEPHALEXIN 500 MG/1
CAPSULE ORAL
COMMUNITY
Start: 2022-11-11 | End: 2023-01-08

## 2022-11-16 RX ORDER — VALACYCLOVIR HYDROCHLORIDE 1 G/1
1000 TABLET, FILM COATED ORAL 2 TIMES DAILY
Qty: 10 TABLET | Refills: 1 | Status: SHIPPED | OUTPATIENT
Start: 2022-11-16 | End: 2023-02-11

## 2022-11-16 RX ORDER — BUSPIRONE HYDROCHLORIDE 10 MG/1
10 TABLET ORAL 2 TIMES DAILY
Qty: 180 TABLET | Refills: 1 | Status: ON HOLD | OUTPATIENT
Start: 2022-11-16 | End: 2023-02-15

## 2022-11-16 ASSESSMENT — ANXIETY QUESTIONNAIRES
GAD7 TOTAL SCORE: 2
7. FEELING AFRAID AS IF SOMETHING AWFUL MIGHT HAPPEN: NOT AT ALL
IF YOU CHECKED OFF ANY PROBLEMS ON THIS QUESTIONNAIRE, HOW DIFFICULT HAVE THESE PROBLEMS MADE IT FOR YOU TO DO YOUR WORK, TAKE CARE OF THINGS AT HOME, OR GET ALONG WITH OTHER PEOPLE: SOMEWHAT DIFFICULT
5. BEING SO RESTLESS THAT IT IS HARD TO SIT STILL: NOT AT ALL
1. FEELING NERVOUS, ANXIOUS, OR ON EDGE: SEVERAL DAYS
GAD7 TOTAL SCORE: 2
8. IF YOU CHECKED OFF ANY PROBLEMS, HOW DIFFICULT HAVE THESE MADE IT FOR YOU TO DO YOUR WORK, TAKE CARE OF THINGS AT HOME, OR GET ALONG WITH OTHER PEOPLE?: SOMEWHAT DIFFICULT
2. NOT BEING ABLE TO STOP OR CONTROL WORRYING: NOT AT ALL
7. FEELING AFRAID AS IF SOMETHING AWFUL MIGHT HAPPEN: NOT AT ALL
6. BECOMING EASILY ANNOYED OR IRRITABLE: SEVERAL DAYS
GAD7 TOTAL SCORE: 2
4. TROUBLE RELAXING: NOT AT ALL
3. WORRYING TOO MUCH ABOUT DIFFERENT THINGS: NOT AT ALL

## 2022-11-16 ASSESSMENT — PATIENT HEALTH QUESTIONNAIRE - PHQ9
10. IF YOU CHECKED OFF ANY PROBLEMS, HOW DIFFICULT HAVE THESE PROBLEMS MADE IT FOR YOU TO DO YOUR WORK, TAKE CARE OF THINGS AT HOME, OR GET ALONG WITH OTHER PEOPLE: SOMEWHAT DIFFICULT
SUM OF ALL RESPONSES TO PHQ QUESTIONS 1-9: 5
SUM OF ALL RESPONSES TO PHQ QUESTIONS 1-9: 5

## 2022-11-16 ASSESSMENT — ENCOUNTER SYMPTOMS: NERVOUS/ANXIOUS: 1

## 2022-11-16 NOTE — PROGRESS NOTES
"  Assessment & Plan     Anxiety  Moderate episode of recurrent major depressive disorder (H)  PHQ9 of 5, down from 14 and GAD7 of 2, down from 5. Stable on desvenlafaxine and buspirone 10mg BID. Will refill medication today. Follow up in 6 months.  - busPIRone (BUSPAR) 10 MG tablet  Dispense: 180 tablet; Refill: 1    Herpes simplex vulvovaginitis  Immunocompromised (H)  Does have infrequent genital herpes outbreaks. Has been on valacyclovir in the past for outbreaks but ran out. Most recently had outbreak starting one week ago. Discussed using valacyclovir for 5 days at the first sign of an outbreak to help with symptoms. Using increased dose due to immunocompromised state 2/2 RA medications.   - valACYclovir (VALTREX) 1000 mg tablet  Dispense: 10 tablet; Refill: 1    Morbid obesity (H)  Per chart review, weight stable around 200-210lbs since 2019. Was on topiramate in the past. Discontinued about 3 months ago and weight has been stable. Patient admits to not exercising at all. Discussed lifestyle modification including gradual incorporation of exercise - can consider swimming as patient does have back pain. Can revisit at next follow up in 2 months to reassess.    BMI:   Estimated body mass index is 34.75 kg/m  as calculated from the following:    Height as of this encounter: 1.651 m (5' 5\").    Weight as of this encounter: 94.7 kg (208 lb 12.8 oz).     Return in about 2 months (around 1/16/2023) for Routine preventive.    Duy Leyva MD  [facility]  11/16/2022    Alejandro Miles is a 82 year old presenting for the following health issues:    Recheck Medication, Anxiety, and Weight Loss (Would like to lose weight)    Anxiety    History of Present Illness       Mental Health Follow-up: Patient's depression since last visit has been:  Better  The patient is not having other symptoms associated with depression.  Patient's anxiety since last visit has been:  Better  The patient is not having " "other symptoms associated with anxiety.    Patient is not feeling anxious or having panic attacks.  Patient has no concerns about alcohol or drug use.    Reason for visit:  Follow up, info on flue shot, and weight issue    She eats 2-3 servings of fruits and vegetables daily.She consumes 0 sweetened beverage(s) daily.She exercises with enough effort to increase her heart rate 9 or less minutes per day.  She exercises with enough effort to increase her heart rate 3 or less days per week.   She is taking medications regularly.    Today's PHQ-9         PHQ-9 Total Score: 5    PHQ-9 Q9 Thoughts of better off dead/self-harm past 2 weeks :   Not at all      Today's SPRING-7 Score: 2     Mood is much better  Still frustrated with her sister since she relocated to this area  Sister has patient as medical decision maker if she gets sick.  Is tolerating medication ok    Has had rash on vulva for the past week.  Not sure how it started out.   Has now been there for about one week  Dx with herpes in the past and has been on valacyclovir before  Only 3rd time it has happened.    Bronchitis/recent illness  Feels back to baseline  Is only on cephalexin for recent nose MOHS procedure  Completed other abx    Weight loss  Stopped topiramate about 3 months ago  No increase in weight since that time  Doesn't exercise due to back pain.    Review of Systems   Psychiatric/Behavioral: The patient is nervous/anxious.           Objective    /78 (BP Location: Right arm, Patient Position: Sitting, Cuff Size: Adult Regular)   Pulse 91   Temp 97.6  F (36.4  C) (Oral)   Resp 16   Ht 1.651 m (5' 5\")   Wt 94.7 kg (208 lb 12.8 oz)   SpO2 96%   BMI 34.75 kg/m    Body mass index is 34.75 kg/m .     Physical Exam     GENERAL: healthy, alert and no distress  HEAD: Normocephalic, atraumatic.   EYES: Normal conjunctivae, sclera.   RESP: lungs clear to auscultation - no rales, rhonchi or wheezes  CV: regular rate and rhythm, normal S1 S2, no " murmur, click, rub or gallop.  MSK: no gross musculoskeletal defects noted.  SKIN: Two small healing sores, ~1-2 mm diameter, on erythematous base in vulvar region.  EXT: Warm and well perfused.   NEURO: CNII-XII grossly intact. No focal deficits.  PSYCH: Groomed, dressed appropriately for weather. Normal mood with consistent affect.

## 2022-11-21 ENCOUNTER — THERAPY VISIT (OUTPATIENT)
Dept: PHYSICAL THERAPY | Facility: CLINIC | Age: 82
End: 2022-11-21
Payer: COMMERCIAL

## 2022-11-21 DIAGNOSIS — M54.6 THORACIC SPINE PAIN: Primary | ICD-10-CM

## 2022-11-21 PROCEDURE — 97140 MANUAL THERAPY 1/> REGIONS: CPT | Mod: GP | Performed by: PHYSICAL THERAPIST

## 2022-11-21 PROCEDURE — 97110 THERAPEUTIC EXERCISES: CPT | Mod: GP | Performed by: PHYSICAL THERAPIST

## 2022-11-21 NOTE — PROGRESS NOTES
PROGRESS  REPORT    Progress reporting period is from eval to 11/21/22.       SUBJECTIVE  Subjective changes noted by patient:  .  Subjective: Better.  Functioning at 80-90% of where she wants to be.    Current pain level is  Current Pain level: 1/10.     Previous pain level was   Initial Pain level: 4/10 (10/10 at worst).   Changes in function:  Yes (See Goal flowsheet attached for changes in current functional level)  Adverse reaction to treatment or activity: None    OBJECTIVE  Changes noted in objective findings:  Yes,   Objective: Thoracic ROM WNL. V/c's for posture correction.  Rounded forward posture stil present but corrected when cued.     ASSESSMENT/PLAN  Updated problem list and treatment plan: Diagnosis 1:  Thoracic pain  Pain -  self management, education, directional preference exercise and home program  Decreased strength - therapeutic exercise and therapeutic activities  Impaired muscle performance - neuro re-education  STG/LTGs have been met or progress has been made towards goals:  Yes (See Goal flow sheet completed today.)  Assessment of Progress: The patient's condition is improving.  The patient's condition has potential to improve.  Self Management Plans:  Patient has been instructed in a home treatment program.  Patient is independent in a home treatment program.  Patient  has been instructed in self management of symptoms.  I have re-evaluated this patient and find that the nature, scope, duration and intensity of the therapy is appropriate for the medical condition of the patient.      Recommendations:  This patient is ready to be discharged from therapy and continue their home treatment program.    Please refer to the daily flowsheet for treatment today, total treatment time and time spent performing 1:1 timed codes.

## 2022-12-05 ENCOUNTER — VIRTUAL VISIT (OUTPATIENT)
Dept: PSYCHOLOGY | Facility: CLINIC | Age: 82
End: 2022-12-05
Attending: STUDENT IN AN ORGANIZED HEALTH CARE EDUCATION/TRAINING PROGRAM
Payer: COMMERCIAL

## 2022-12-05 DIAGNOSIS — F33.1 MODERATE EPISODE OF RECURRENT MAJOR DEPRESSIVE DISORDER (H): Primary | ICD-10-CM

## 2022-12-05 PROCEDURE — 90791 PSYCH DIAGNOSTIC EVALUATION: CPT | Mod: 95

## 2022-12-05 ASSESSMENT — COLUMBIA-SUICIDE SEVERITY RATING SCALE - C-SSRS
2. HAVE YOU ACTUALLY HAD ANY THOUGHTS OF KILLING YOURSELF IN THE PAST MONTH?: NO
3. HAVE YOU BEEN THINKING ABOUT HOW YOU MIGHT KILL YOURSELF?: NO
4. HAVE YOU HAD THESE THOUGHTS AND HAD SOME INTENTION OF ACTING ON THEM?: NO
5. HAVE YOU STARTED TO WORK OUT OR WORKED OUT THE DETAILS OF HOW TO KILL YOURSELF? DO YOU INTEND TO CARRY OUT THIS PLAN?: NO
6. HAVE YOU EVER DONE ANYTHING, STARTED TO DO ANYTHING, OR PREPARED TO DO ANYTHING TO END YOUR LIFE?: NO
1. IN THE PAST MONTH, HAVE YOU WISHED YOU WERE DEAD OR WISHED YOU COULD GO TO SLEEP AND NOT WAKE UP?: NO

## 2022-12-05 ASSESSMENT — PATIENT HEALTH QUESTIONNAIRE - PHQ9
SUM OF ALL RESPONSES TO PHQ QUESTIONS 1-9: 9
SUM OF ALL RESPONSES TO PHQ QUESTIONS 1-9: 9
10. IF YOU CHECKED OFF ANY PROBLEMS, HOW DIFFICULT HAVE THESE PROBLEMS MADE IT FOR YOU TO DO YOUR WORK, TAKE CARE OF THINGS AT HOME, OR GET ALONG WITH OTHER PEOPLE: SOMEWHAT DIFFICULT

## 2022-12-05 NOTE — PROGRESS NOTES
"[facility]      PATIENT'S NAME: Ginger Marshall  PREFERRED NAME: Ginger  PRONOUNS:     she her  MRN: [id number]  : 1940  ADDRESS: [address]  [place] [place] [address]  ACCT. NUMBER:  [id number]  DATE OF SERVICE: 22  START TIME: 11:00 am  END TIME: 11:45 am  PREFERRED PHONE: [phone]  May we leave a program related message: Yes  SERVICE MODALITY:  Video Visit:      Provider verified identity through the following two step process.  Patient provided:  Patient address    Telemedicine Visit: The patient's condition can be safely assessed and treated via synchronous audio and visual telemedicine encounter.      Reason for Telemedicine Visit: Patient has requested telehealth visit    Originating Site (Patient Location): Patient's home    Distant Site (Provider Location): Provider Remote Setting- Home Office    Consent:  The patient/guardian has verbally consented to: the potential risks and benefits of telemedicine (video visit) versus in person care; bill my insurance or make self-payment for services provided; and responsibility for payment of non-covered services.     Patient would like the video invitation sent by:  My Chart    Mode of Communication:  Video Conference via [employer]    Distant Location (Provider):  Off-site    As the provider I attest to compliance with applicable laws and regulations related to telemedicine.    UNIVERSAL ADULT Mental Health DIAGNOSTIC ASSESSMENT    Identifying Information:  Patient is a 82 year old,   individual.  [facility]  Patient attended the session alone.    Chief Complaint:   The reason for seeking services at this time is: \"Anxiety/depression\".  Situational stressors of caregiving for sister who has Alzheimer's. The problem(s) began 09/01/15.    Patient has not attempted to resolve these concerns in the past.    Social/Family History:  Patient " reported they grew up in [place]  .  They were raised by biological parents  .  Parents one or both remarried.  Patient reported that their childhood was pretty normal but dad was an alcoholic. She would go fishing and to bars with him. Dad did not hurt her physically or emotionally.  It was  hard when they  in early teens - got mouthy with mom who sent patient away to reform school for a year or 2, then lived in 2-3  foster homes until graduating and going out on her own. Older sister (6 years older) stayed with mom until she got . Mom remarried and had another child. Patient described their current relationships with family of origin as parents are , sister has Alzheimer's .     The patient describes their cultural background as .  Cultural influences and impact on patient's life structure, values, norms, and healthcare: I don't understand this question.  Dad's alcoholism impacted family.  Contextual influenceducation es on patient's health include: Contextual Factors: Economic Factors mom managed on her own, didn't have family to provide any support.  These factors will be addressed  the Preliminary Treatment plan. Patient identified their preferred language to be English. Patient reported they does not need the assistance of an  or other support involved in therapy.     Patient reported had no significant delays in developmental tasks. Patient's highest level was associate degree / vocational certificate  .   Patient identified the following learning problems: none reported.  Modifications will not be used to assist communication in therapy.  Patient reports they are  able to understand written materials.    Patient reported the following relationship history  1 time-had 4 kids, (first and  Last kids  passed- one  at 6 months, another son passed at age 60 last year.)   at age 30.  Patient's current status is single-had some relationships.  Patient identified their sexual orientation as heterosexual.  Patient reported having 4 child(eric). Patient identified friends as part of their support system.  Patient identified the quality of these relationships as fair  .      Patient's current living/housing situation involves staying in own home/apartment.  The immediate members of family and household include Ginger Albertss, 82,Self  and they report that housing is stable.    Patient is currently retired.  Patient reports their finances are obtained through other. Patient does not identify finances as a current stressor.      Patient reported that they have not been involved with the legal system. Patient does not report being under probation/ parole/ jurisdiction. They are not under any current court jurisdiction. .    Patient's Strengths and Limitations:  Patient identified the following strengths or resources that will help them succeed in treatment: Confucianism / Yazdanism, jena / spirituality, friends / good social support, insight and intelligence. Things that may interfere with the patient's success in treatment include: financial hardship.     Assessments:  The following assessments were completed by patient for this visit:  PHQ2:   PHQ-2 ( 1999 [employer]) 11/16/2022 8/5/2022 7/6/2022 3/23/2022 1/18/2022 11/27/2020 4/10/2020   Q1: Little interest or pleasure in doing things 1 3 - 1 1 1 0   Q2: Feeling down, depressed or hopeless 1 0 - 0 0 0 0   PHQ-2 Score 2 3 - 1 1 1 0   PHQ-2 Total Score (12-17 Years)- Positive if 3 or more points; Administer PHQ-A if positive - - - - - 1 0   Q1: Little interest or pleasure in doing things Several days Nearly every day - Several days Several days Several days -   Q2: Feeling down, depressed or hopeless Several days Not at all - Not at all Not at all Not at all -   PHQ-2 Score 2 3 Incomplete 1 1 1 -     PHQ9:   PHQ-9 SCORE 6/22/2021 1/20/2022 7/6/2022 8/5/2022 10/21/2022 11/16/2022 12/5/2022   PHQ-9 Total Score - - - - - - -    PHQ-9 Total Score MyChart - - 3 (Minimal depression) 6 (Mild depression) 14 (Moderate depression) 5 (Mild depression) 9 (Mild depression)   PHQ-9 Total Score 12 9 3 6 14 5 9     GAD2:   SPRING-2 12/5/2022 12/5/2022   Feeling nervous, anxious, or on edge 1 1   Not being able to stop or control worrying 1 1   SPRING-2 Total Score 2 2     GAD7:   SPRING-7 SCORE 2/10/2017 2/22/2018 6/5/2019 6/16/2020 7/6/2022 8/5/2022 11/16/2022   Total Score - - - - 2 (minimal anxiety) 5 (mild anxiety) 2 (minimal anxiety)   Total Score 1 0 0 7 2 5 2     CAGE-AID:   CAGE-AID Total Score 12/5/2022   Total Score 0   Total Score [facility] 0 (A total score of 2 or greater is considered clinically significant)     PROMIS 10-Global Health (only subscores and total score):   PROMIS-10 Scores Only 9/21/2017 11/15/2018 3/25/2022 12/5/2022 12/5/2022   Global Mental Health Score 13 12 12 8 8   Global Physical Health Score 14 12 12 13 13   PROMIS TOTAL - SUBSCORES 27 24 24 21 21     [place] Suicide Severity Rating Scale (Lifetime/Recent)  [place] Suicide Severity Rating (Lifetime/Recent) 12/5/2022   Q1 Wished to be Dead (Past Month) no   Q2 Suicidal Thoughts (Past Month) no   Q3 Suicidal Thought Method no   Q4 Suicidal Intent without Specific Plan no   Q5 Suicide Intent with Specific Plan no   Q6 Suicide Behavior (Lifetime) no   Level of Risk per Screen low risk       Personal and Family Medical History:  Patient does not report a family history of mental health concerns.  Patient reports family history includes Alcohol/Drug in her father; Alzheimer Disease in her mother; Arthritis in her mother and sister; Cancer in her son; Diabetes in her son, son and another family member; Glaucoma in her daughter; Heart Disease in her father; Hyperlipidemia in her mother, sister, and another family member; Hypertension in her sister and sister; Mental Illness in her sister; Neurologic Disorder in her sister; Osteoporosis in her mother; Other Cancer in her sister and  son; Substance Abuse in her son..     Patient does report Mental Health Diagnosis and/or Treatment.  Patient Patient reported the following previous diagnoses which include(s): an Anxiety Disorder and Depression.  Patient reported symptoms began unknown .   Patient has received mental health services in the past: medication-with PCP.  Psychiatric Hospitalizations: None.  Patient denies a history of civil commitment.  Patient is not receiving other mental health services.  These include none.         Patient has had a physical exam to rule out medical causes for current symptoms.  Date of last physical exam was within the past year. Client was encouraged to follow up with PCP if symptoms were to develop. The patient has a [place] Primary Care Provider, who is named Duy Leyva.  Patient reports the following current medical concerns: mobility, back pain.  Patient reports pain concerns including back pain.  Patient does not want help addressing pain concerns.  There are significant appetite / nutritional concerns / weight changes. Lost 60 #, is gaining some back. Patient does not report a history of head injury / trauma / cognitive impairment.        Current Outpatient Medications:      acetaminophen (TYLENOL) 500 MG tablet, Take 1-2 tablets (500-1,000 mg) by mouth 2 times daily, Disp: , Rfl:      albuterol (PROAIR HFA/PROVENTIL HFA/VENTOLIN HFA) 108 (90 Base) MCG/ACT inhaler, Inhale 2 puffs into the lungs every 6 hours, Disp: 18 g, Rfl: 0     atorvastatin (LIPITOR) 40 MG tablet, Take 1 tablet (40 mg) by mouth daily, Disp: 90 tablet, Rfl: 3     Bioflavonoid Products (VITAMIN C) CHEW, , Disp: , Rfl:      busPIRone (BUSPAR) 10 MG tablet, Take 1 tablet (10 mg) by mouth 2 times daily, Disp: 180 tablet, Rfl: 1     cephALEXin (KEFLEX) 500 MG capsule, , Disp: , Rfl:      Cholecalciferol (VITAMIN D-3 PO), Take 1,000 Units by mouth 2 times daily, Disp: , Rfl:      desvenlafaxine (PRISTIQ) 100 MG 24 hr tablet, Take 1 tablet  (100 mg) by mouth daily, Disp: 90 tablet, Rfl: 1     guaiFENesin (MUCINEX) 600 MG 12 hr tablet, Take 1 tablet (600 mg) by mouth 2 times daily, Disp: 20 tablet, Rfl: 0     guaiFENesin-codeine (CHERATUSSIN AC) 100-10 MG/5ML solution, Take 5 mLs by mouth nightly as needed for cough, Disp: 60 mL, Rfl: 0     hydrocortisone, Perianal, (HYDROCORTISONE) 2.5 % cream, Place rectally 2 times daily as needed for hemorrhoids, Disp: 30 g, Rfl: 1     leucovorin (WELLCOVORIN) 5 MG tablet, Take 1 tablet (5 mg) by mouth every 7 days . Take 24 hours after taking Methotrexate each week., Disp: 13 tablet, Rfl: 2     loratadine (CLARITIN) 10 MG tablet, Take 10 mg by mouth daily, Disp: , Rfl:      Methotrexate, PF, (RASUVO) 25 MG/0.5ML autoinjector, Inject 0.5 mLs (25 mg) Subcutaneous every 7 days . Hold for signs of infection, and seek medical attention., Disp: 2 mL, Rfl: 6     pantoprazole (PROTONIX) 40 MG EC tablet, TAKE ONE TABLET BY MOUTH ONCE DAILY 30-60 MINUTES BEFORE A MEAL, Disp: 90 tablet, Rfl: 1     predniSONE (DELTASONE) 20 MG tablet, Take 3 tabs by mouth daily x 3 days, then 2 tabs daily x 3 days, then 1 tab daily x 3 days, then 1/2 tab daily x 3 days., Disp: 20 tablet, Rfl: 0     sucralfate (CARAFATE) 1 GM tablet, TAKE ONE TABLET BY MOUTH FOUR TIMES A DAY AS NEEDED HEATBURN, Disp: 120 tablet, Rfl: 1     Upadacitinib ER (RINVOQ) 15 MG TB24, Take 15 mg by mouth daily, Disp: 30 tablet, Rfl: 6     valACYclovir (VALTREX) 1000 mg tablet, Take 1 tablet (1,000 mg) by mouth 2 times daily for 5 days Start at first sign of outbreak, Disp: 10 tablet, Rfl: 1      Medication Adherence:  Patient reports taking.      Patient Allergies:    Allergies   Allergen Reactions     Abatacept Other (See Comments)     Severe headaches  Migraine     Celebrex [Celecoxib]      Ineffective     Celecoxib Unknown and Nausea     Ethanol      Antihistamines     Levaquin [Levofloxacin] Fatigue     Severe body pain     Orencia [Abatacept]      Headache      "Septra [Bactrim]      Sulfa Drugs Nausea and Vomiting     \"deathly ill\"  Allergic to everything with Sulfa in it.     Sulfamethoxazole-Trimethoprim Nausea and Nausea and Vomiting     Tramadol Other (See Comments)     Headache  Migraine headache     Valdecoxib Unknown and Nausea     Made me \"very very ill\", might of been \"cramping\"     Adhesive Tape Rash     Plastic tape  Plastic tapes     Antihistamines, Chlorpheniramine-Type  [Alkylamines] Anxiety and Other (See Comments)       Medical History:    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     Menopause 1989    late 40's     Migraine 04/27/2014    resolved     Multinodular goiter      Other chronic pain     joints     Pelvic fracture (H) 05/13/2014     Rheumatic mitral stenosis      Rheumatoid arteritis (H)      S/P lumbar fusion 04/03/2017     Sleep apnea      Vitamin B12 deficiency          Current Mental Status Exam:   Appearance:  Appropriate    Eye Contact:  Good   Psychomotor:  Normal       Gait / station:  unsteady  Attitude / Demeanor: Cooperative  Pleasant  Speech      Rate / Production: Normal/ Responsive      Volume:  Normal  volume      Language:  intact  Mood:   Anxious  Depressed   Affect:   Appropriate    Thought Content: Clear   Thought Process: Coherent       Associations: No loosening of associations  Insight:   Good   Judgment:  Intact   Orientation:  All  Attention/concentration: Good      Substance Use:  Patient did report a family " history of substance use concerns; see medical history section for details.  Patient has not received chemical dependency treatment in the past.  Patient has not ever been to detox.      Patient is not currently receiving any chemical dependency treatment.           Substance History of use Age of first use Date of last use     Pattern and duration of use (include amounts and frequency)   Alcohol currently use   20 11/24/22 REPORTS SUBSTANCE USE: reports using substance 2 times per month and has 2 bell at a time.   Patient reports heaviest use was in late 30s early 40s.   Cannabis   used in the past 27 08/11/95 REPORTS SUBSTANCE USE: N/A     Amphetamines   never used     REPORTS SUBSTANCE USE: N/A   Cocaine/crack    never used       REPORTS SUBSTANCE USE: N/A   Hallucinogens never used         REPORTS SUBSTANCE USE: N/A   Inhalants never used         REPORTS SUBSTANCE USE: N/A   Heroin never used         REPORTS SUBSTANCE USE: N/A   Other Opiates never used     REPORTS SUBSTANCE USE: N/A   Benzodiazepine   never used     REPORTS SUBSTANCE USE: N/A   Barbiturates never used     REPORTS SUBSTANCE USE: N/A   Over the counter meds never used     REPORTS SUBSTANCE USE: N/A   Caffeine used in the past I don't know   REPORTS SUBSTANCE USE: N/A   Nicotine  used in the past I don t know 12/31/99 REPORTS SUBSTANCE USE: N/A   Other substances not listed above:  Identify:  never used     REPORTS SUBSTANCE USE: N/A     Patient reported the following problems as a result of their substance use: no problems, not applicable.    Substance Use: No symptoms    Based on the negative CAGE score and clinical interview there  are not indications of drug or alcohol abuse.      Significant Losses / Trauma / Abuse / Neglect Issues:   Patient did not  serve in the .  There are indications or report of significant loss, trauma, abuse or neglect issues related to: divorce / relational changes parent's divorce, loss of  at a  young age from cirrohisis.   Concerns for possible neglect are not present.    Safety Assessment:   Patient denies current homicidal ideation and behaviors.  Patient denies current self-injurious ideation and behaviors.    Patient denied risk behaviors associated with substance use.  Patient denies any high risk behaviors associated with mental health symptoms.  Patient reports the following current concerns for their personal safety: None.  Patient reports there are not firearms in the house.       There are no firearms in the home..    History of Safety Concerns:  Patient denied a history of homicidal ideation.     Patient denied a history of personal safety concerns.    Patient denied a history of assaultive behaviors.    Patient denied a history of sexual assault behaviors.     Patient denied a history of risk behaviors associated with substance use.  Patient denies any history of high risk behaviors associated with mental health symptoms.  Patient reports the following protective factors: other    Risk Plan:  See Recommendations for Safety and Risk Management Plan    Review of Symptoms per patient report:   Depression: Change in sleep, Lack of interest, Excessive or inappropriate guilt, Change in energy level, Difficulties concentrating, Psychomotor slowing or agitation, Feelings of hopelessness, Feelings of helplessness, Low self-worth, Feeling sad, down, or depressed and Poor hygeine  Carmela:  No Symptoms  Psychosis: No Symptoms  Anxiety: Excessive worry, Nervousness and Physical complaints, such as headaches, stomachaches, muscle tension  Panic:  No symptoms  Post Traumatic Stress Disorder:  No Symptoms   Eating Disorder: Binging and unhealthy food, inconsistent frequency  ADD / ADHD:  No symptoms  Conduct Disorder: No symptoms  Autism Spectrum Disorder: No symptoms  Obsessive Compulsive Disorder: No Symptoms    Patient reports the following compulsive behaviors and treatment history: none.      Diagnostic  Criteria:   Major Depressive Disorder  A) Single episode - symptoms have been present during the same 2-week period and represent a change from previous functioning 5 or more symptoms (required for diagnosis)   - Depressed mood. Note: In children and adolescents, can be irritable mood.     - Diminished interest or pleasure in all, or almost all, activities.    - Psychomotor activity retardation.    - Fatigue or loss of energy.    - Feelings of worthlessness or inappropriate and excessive guilt.    - Diminished ability to think or concentrate, or indecisiveness.     Functional Status:  Patient reports the following functional impairments:  chronic disease management, health maintenance, management of the household and or completion of tasks and relationship(s).     Nonprogrammatic care:  Patient is requesting basic services to address current mental health concerns.    Clinical Summary:  1. Reason for assessment: depression-anxiety  .  2. Psychosocial, Cultural and Contextual Factors: caregiver role, isolation, aging  .  3. Principal DSM5 Diagnoses  (Sustained by DSM5 Criteria Listed Above):   296.32 (F33.1) Major Depressive Disorder, Recurrent Episode, Moderate With anxious distress.  4. Other Diagnoses that is relevant to services:   n/a  5. Provisional Diagnosis:  296.32 (F33.1) Major Depressive Disorder, Recurrent Episode, Moderate With anxious distress as evidenced by ROS .  6. Prognosis: Relieve Acute Symptoms.  7. Likely consequences of symptoms if not treated: continued decompensation.  8. Client strengths include:  good listener .     Recommendations:     1. Plan for Safety and Risk Management:   Safety and Risk: Recommended that patient call 911 or go to the local ED should there be a change in any of these risk factors..          Report to child / adult protection services was NA.     2. Patient's identified struggle with caregiving will be included in sessions.     3. Initial Treatment will focus on:     Depressed Mood - and anxiety.     4. Resources/Service Plan:    services are not indicated.   Modifications to assist communication are not indicated.   Additional disability accommodations are not indicated.      5. Collaboration:   Collaboration / coordination of treatment will be initiated with the following  support professionals: none at this time.      6.  Referrals:   The following referral(s) will be initiated: potentially 55 plus group. Next Scheduled Appointment: 12/12/22.      A Release of Information has been obtained for the following: none.     Emergency Contact daughter was obtained.      Clinical Substantiation/medical necessity for the above recommendations:  Yes, level of symptoms indicates MDD.    7. SONALI:    SONALI:  Discussed the general effects of drugs and alcohol on health and well-being. Provider gave patient printed information about the effects of chemical use on their health and well being. Recommendations:  n/a .     8. Records:   These were reviewed at time of assessment.   Information in this assessment was obtained from the medical record and provided by patient who is a fair historian.    Patient will have open access to their mental health medical record.    9.   Interactive Complexity: No      Provider Name/ Credentials:  Rocío CARTER LICSW  December 5, 2022

## 2022-12-12 ENCOUNTER — VIRTUAL VISIT (OUTPATIENT)
Dept: PSYCHOLOGY | Facility: CLINIC | Age: 82
End: 2022-12-12
Payer: COMMERCIAL

## 2022-12-12 DIAGNOSIS — F33.1 MODERATE EPISODE OF RECURRENT MAJOR DEPRESSIVE DISORDER (H): Primary | ICD-10-CM

## 2022-12-12 PROCEDURE — 90834 PSYTX W PT 45 MINUTES: CPT | Mod: 95

## 2022-12-12 ASSESSMENT — PATIENT HEALTH QUESTIONNAIRE - PHQ9
SUM OF ALL RESPONSES TO PHQ QUESTIONS 1-9: 7
10. IF YOU CHECKED OFF ANY PROBLEMS, HOW DIFFICULT HAVE THESE PROBLEMS MADE IT FOR YOU TO DO YOUR WORK, TAKE CARE OF THINGS AT HOME, OR GET ALONG WITH OTHER PEOPLE: SOMEWHAT DIFFICULT
SUM OF ALL RESPONSES TO PHQ QUESTIONS 1-9: 7

## 2022-12-12 NOTE — PROGRESS NOTES
[facility]                                     Progress Note    Patient Name: Ginger Marshall  Date: 12/12/22         Service Type: Individual      Session Start Time: 10:00 am  Session End Time: 10:45 am     Session Length: 45 min    Session #: 2    Attendees: Client attended alone    Service Modality:  Video Visit:      Provider verified identity through the following two step process.  Patient provided:  Patient is known previously to provider    Telemedicine Visit: The patient's condition can be safely assessed and treated via synchronous audio and visual telemedicine encounter.      Reason for Telemedicine Visit: Patient convenience (e.g. access to timely appointments / distance to available provider)    Originating Site (Patient Location): Patient's home    Distant Site (Provider Location): Provider Remote Setting- Home Office    Consent:  The patient/guardian has verbally consented to: the potential risks and benefits of telemedicine (video visit) versus in person care; bill my insurance or make self-payment for services provided; and responsibility for payment of non-covered services.     Patient would like the video invitation sent by:  My Chart    Mode of Communication:  Video Conference via [facility]    Distant Location (Provider):  Off-site    As the provider I attest to compliance with applicable laws and regulations related to telemedicine.    DATA  Interactive Complexity: No  Crisis: No        Progress Since Last Session (Related to Symptoms / Goals / Homework):   Symptoms: No change continued depression    Homework: Not assigned      Episode of Care Goals: No improvement - PRECONTEMPLATION (Not seeing need for change); Intervened by educating the patient about the effects of current behavior on health.  Evoked information about reasons to continue behavior, express concern / recommendations, and explored any change talk     Current / Ongoing Stressors and Concerns:   Distress  regarding sister with Alzheimer's desire to move home. Anniversary of son's death. Limited food intake, ability to get things done around the house.     Treatment Objective(s) Addressed in This Session:   Improve quantity and quality of night time sleep / decrease daytime naps     Intervention:   CBT: Introduced sleep hygiene strategies to support improved quality and duration of sleep. Identtified Bible study as a replaacement for TV watching. Surfaced distress around sister's choices/actions. Recognized concerns regarding her ability to manage her healthcare, ADLs.     Assessments completed prior to visit:  The following assessments were completed by patient for this visit:  PHQ2:   PHQ-2 ( 1999 [employer]) 11/16/2022 8/5/2022 7/6/2022 3/23/2022 1/18/2022 11/27/2020 4/10/2020   Q1: Little interest or pleasure in doing things 1 3 - 1 1 1 0   Q2: Feeling down, depressed or hopeless 1 0 - 0 0 0 0   PHQ-2 Score 2 3 - 1 1 1 0   PHQ-2 Total Score (12-17 Years)- Positive if 3 or more points; Administer PHQ-A if positive - - - - - 1 0   Q1: Little interest or pleasure in doing things Several days Nearly every day - Several days Several days Several days -   Q2: Feeling down, depressed or hopeless Several days Not at all - Not at all Not at all Not at all -   PHQ-2 Score 2 3 Incomplete 1 1 1 -     PHQ9:   PHQ-9 SCORE 1/20/2022 7/6/2022 8/5/2022 10/21/2022 11/16/2022 12/5/2022 12/12/2022   PHQ-9 Total Score - - - - - - -   PHQ-9 Total Score MyChart - 3 (Minimal depression) 6 (Mild depression) 14 (Moderate depression) 5 (Mild depression) 9 (Mild depression) 7 (Mild depression)   PHQ-9 Total Score 9 3 6 14 5 9 7     GAD2:   SPRING-2 12/5/2022 12/5/2022   Feeling nervous, anxious, or on edge 1 1   Not being able to stop or control worrying 1 1   SPRING-2 Total Score 2 2     GAD7:   SPRING-7 SCORE 2/10/2017 2/22/2018 6/5/2019 6/16/2020 7/6/2022 8/5/2022 11/16/2022   Total Score - - - - 2 (minimal anxiety) 5 (mild anxiety) 2 (minimal  anxiety)   Total Score 1 0 0 7 2 5 2     CAGE-AID:   CAGE-AID Total Score 12/5/2022   Total Score 0   Total Score MyChart 0 (A total score of 2 or greater is considered clinically significant)     PROMIS 10-Global Health (only subscores and total score):   PROMIS-10 Scores Only 9/21/2017 11/15/2018 3/25/2022 12/5/2022 12/5/2022   Global Mental Health Score 13 12 12 8 8   Global Physical Health Score 14 12 12 13 13   PROMIS TOTAL - SUBSCORES 27 24 24 21 21     [place] Suicide Severity Rating Scale (Lifetime/Recent)  [place] Suicide Severity Rating (Lifetime/Recent) 12/5/2022   Q1 Wished to be Dead (Past Month) no   Q2 Suicidal Thoughts (Past Month) no   Q3 Suicidal Thought Method no   Q4 Suicidal Intent without Specific Plan no   Q5 Suicide Intent with Specific Plan no   Q6 Suicide Behavior (Lifetime) no   Level of Risk per Screen low risk         ASSESSMENT: Current Emotional / Mental Status (status of significant symptoms):   Risk status (Self / Other harm or suicidal ideation)   Patient denies current fears or concerns for personal safety.   Patient denies current or recent suicidal ideation or behaviors.   Patient denies current or recent homicidal ideation or behaviors.   Patient denies current or recent self injurious behavior or ideation.   Patient denies other safety concerns.   Patient reports there has been no change in risk factors since their last session.     Patient reports there has been no change in protective factors since their last session.     Recommended that patient call 911 or go to the local ED should there be a change in any of these risk factors.     Appearance:   Appropriate    Eye Contact:   Good    Psychomotor Behavior: Restless    Attitude:   Attentive   Orientation:   All   Speech    Rate / Production: Emotional Normal     Volume:  Normal    Mood:    Anxious  Depressed    Affect:    Appropriate    Thought Content:  Clear    Thought Form:  Coherent  Logical    Insight:    Good       Medication Review:   No changes to current psychiatric medication(s)     Medication Compliance:   Yes     Changes in Health Issues:   None reported     Chemical Use Review:   Substance Use: Chemical use reviewed, no active concerns identified      Tobacco Use: No change in amount of tobacco use since last session.  Patient declined discussion at this time    Diagnosis:  1. Moderate episode of recurrent major depressive disorder (H)        Collateral Reports Completed:   Not Applicable    PLAN: (Patient Tasks / Therapist Tasks / Other)  Try sleep hygiene strategies.         Rocío Arenas, [facility] 12/12/22                                                         ______________________________________________________________________

## 2022-12-22 NOTE — NURSING NOTE
"Chief Complaint   Patient presents with     RECHECK     3mo follow up on RA, PT states just her back has been bothering her, just minor aches and pains        Initial /60 mmHg  Pulse 98  Temp(Src) 96.7  F (35.9  C) (Oral)  Wt 91.536 kg (201 lb 12.8 oz)  SpO2 98% Estimated body mass index is 33.34 kg/(m^2) as calculated from the following:    Height as of 1/27/17: 1.657 m (5' 5.25\").    Weight as of this encounter: 91.536 kg (201 lb 12.8 oz).  BP completed using cuff size: regular  Brittney Gagnon MA           RAPID3 (0-30) Cumulative Score  15.8          RAPID3 Weighted Score (divide #4 by 3 and that is the weighted score)  5.3           " No

## 2023-01-08 ENCOUNTER — ANCILLARY PROCEDURE (OUTPATIENT)
Dept: GENERAL RADIOLOGY | Facility: CLINIC | Age: 83
End: 2023-01-08
Attending: FAMILY MEDICINE
Payer: COMMERCIAL

## 2023-01-08 ENCOUNTER — OFFICE VISIT (OUTPATIENT)
Dept: URGENT CARE | Facility: URGENT CARE | Age: 83
End: 2023-01-08
Payer: COMMERCIAL

## 2023-01-08 VITALS
RESPIRATION RATE: 20 BRPM | DIASTOLIC BLOOD PRESSURE: 64 MMHG | HEART RATE: 100 BPM | SYSTOLIC BLOOD PRESSURE: 142 MMHG | TEMPERATURE: 97.3 F | OXYGEN SATURATION: 96 %

## 2023-01-08 DIAGNOSIS — R07.81 RIB PAIN ON RIGHT SIDE: ICD-10-CM

## 2023-01-08 DIAGNOSIS — R07.81 RIB PAIN ON RIGHT SIDE: Primary | ICD-10-CM

## 2023-01-08 PROCEDURE — 99214 OFFICE O/P EST MOD 30 MIN: CPT | Performed by: FAMILY MEDICINE

## 2023-01-08 PROCEDURE — 71101 X-RAY EXAM UNILAT RIBS/CHEST: CPT | Mod: TC | Performed by: RADIOLOGY

## 2023-01-08 RX ORDER — HYDROCODONE BITARTRATE AND ACETAMINOPHEN 5; 325 MG/1; MG/1
1 TABLET ORAL EVERY 6 HOURS PRN
Qty: 6 TABLET | Refills: 0 | Status: SHIPPED | OUTPATIENT
Start: 2023-01-08 | End: 2023-01-16

## 2023-01-08 ASSESSMENT — PAIN SCALES - GENERAL: PAINLEVEL: SEVERE PAIN (6)

## 2023-01-08 NOTE — PROGRESS NOTES
SUBJECTIVE:  Chief Complaint   Patient presents with     Urgent Care     Pt says she cracked a rib and feels severe pain in her upper left backside.      Ginger Marshall is a 82 year old female presents with a chief complaint of severe pain right ribs.    Worried that may have sustained rib fracture, was twisting and developed a sharp pain and heard a pop.  This was 1/3.  States that pain has persisted.  When relaxes and overnight is okay but as she starts to move around, develops more pain.    Using heat and taking tylenol without improvement.   Does note intermittent pain with big breaths.  Had prior rib fractures, has on right and left.    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     Menopause 1989    late 40's     Migraine 04/27/2014    resolved     Multinodular goiter      Other chronic pain     joints     Pelvic fracture (H) 05/13/2014     Rheumatic mitral stenosis      Rheumatoid arteritis (H)      S/P lumbar fusion 04/03/2017     Sleep apnea      Vitamin B12 deficiency      Current Outpatient Medications   Medication Sig Dispense Refill     acetaminophen (TYLENOL) 500 MG tablet Take 1-2 tablets (500-1,000 mg) by mouth 2 times daily       albuterol (PROAIR HFA/PROVENTIL HFA/VENTOLIN HFA) 108 (90 Base) MCG/ACT inhaler Inhale 2 puffs into the lungs every 6 hours 18 g 0     atorvastatin (LIPITOR) 40 MG tablet Take 1 tablet (40 mg) by mouth daily 90 tablet 3     busPIRone (BUSPAR) 10 MG tablet Take 1  tablet (10 mg) by mouth 2 times daily 180 tablet 1     Cholecalciferol (VITAMIN D-3 PO) Take 1,000 Units by mouth 2 times daily       desvenlafaxine (PRISTIQ) 100 MG 24 hr tablet Take 1 tablet (100 mg) by mouth daily 90 tablet 1     guaiFENesin (MUCINEX) 600 MG 12 hr tablet Take 1 tablet (600 mg) by mouth 2 times daily 20 tablet 0     hydrocortisone, Perianal, (HYDROCORTISONE) 2.5 % cream Place rectally 2 times daily as needed for hemorrhoids 30 g 1     leucovorin (WELLCOVORIN) 5 MG tablet Take 1 tablet (5 mg) by mouth every 7 days . Take 24 hours after taking Methotrexate each week. 13 tablet 2     loratadine (CLARITIN) 10 MG tablet Take 10 mg by mouth daily       Methotrexate, PF, (RASUVO) 25 MG/0.5ML autoinjector Inject 0.5 mLs (25 mg) Subcutaneous every 7 days . Hold for signs of infection, and seek medical attention. 2 mL 6     pantoprazole (PROTONIX) 40 MG EC tablet TAKE ONE TABLET BY MOUTH ONCE DAILY 30-60 MINUTES BEFORE A MEAL 90 tablet 1     sucralfate (CARAFATE) 1 GM tablet TAKE ONE TABLET BY MOUTH FOUR TIMES A DAY AS NEEDED HEATBURN 120 tablet 1     Upadacitinib ER (RINVOQ) 15 MG TB24 Take 15 mg by mouth daily 30 tablet 6     valACYclovir (VALTREX) 1000 mg tablet Take 1 tablet (1,000 mg) by mouth 2 times daily for 5 days Start at first sign of outbreak 10 tablet 1     Social History     Tobacco Use     Smoking status: Former     Packs/day: 1.00     Years: 41.00     Pack years: 41.00     Types: Cigarettes     Quit date: 1999     Years since quittin.0     Smokeless tobacco: Never     Tobacco comments:     former smoker   Substance Use Topics     Alcohol use: Yes     Alcohol/week: 0.0 standard drinks     Comment: Occasionally,  usually wine       ROS:  Review of systems negative except as stated above.    EXAM:   BP (!) 142/64   Pulse 100   Temp 97.3  F (36.3  C) (Tympanic)   Resp 20   SpO2 96%   Gen: healthy,alert,no distress  CHEST: clear to auscultation, right posterior lower rib with  tenderness to palpation  EXTREMITIES: peripheral pulses normal  SKIN: no suspicious lesions or rashes  PSYCH:alert, affect bright    X-RAY was done - chest and right ribs - ?acute vs old prior rib fracture right, no pleural effusion, no pneumothorax, no acute infiltrate    ASSESSMENT/PLAN:   (R07.81) Rib pain on right side  (primary encounter diagnosis)  Plan: XR Ribs & Chest Right G/E 3 Views, tiZANidine         (ZANAFLEX) 4 MG tablet,         HYDROcodone-acetaminophen (NORCO) 5-325 MG         tablet            Unclear if aggravation of old prior right rib fracture vs new fracture, discussed symptomatic treatment with tylenol, rest, ice/heat.  Small quantity of norco 5/325 mg #6 given to take for pain, follow up with primary provider if requires further narcotic medication.  RX Zanaflex given to help with probable muscle spasm that is also contributing to pain.  Will follow up on formal Xray report and notify if any abnormalities.    Follow up with primary provider if no improvement of symptoms in 1-2 week    Lorne Talbot MD  January 8, 2023 11:44 AM

## 2023-01-09 ASSESSMENT — PATIENT HEALTH QUESTIONNAIRE - PHQ9
SUM OF ALL RESPONSES TO PHQ QUESTIONS 1-9: 7
SUM OF ALL RESPONSES TO PHQ QUESTIONS 1-9: 7
10. IF YOU CHECKED OFF ANY PROBLEMS, HOW DIFFICULT HAVE THESE PROBLEMS MADE IT FOR YOU TO DO YOUR WORK, TAKE CARE OF THINGS AT HOME, OR GET ALONG WITH OTHER PEOPLE: SOMEWHAT DIFFICULT

## 2023-01-10 ENCOUNTER — VIRTUAL VISIT (OUTPATIENT)
Dept: PSYCHOLOGY | Facility: CLINIC | Age: 83
End: 2023-01-10
Payer: COMMERCIAL

## 2023-01-10 DIAGNOSIS — F33.1 MODERATE EPISODE OF RECURRENT MAJOR DEPRESSIVE DISORDER (H): Primary | ICD-10-CM

## 2023-01-10 PROCEDURE — 90834 PSYTX W PT 45 MINUTES: CPT | Mod: 95
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
## 2023-01-16 ENCOUNTER — OFFICE VISIT (OUTPATIENT)
Dept: FAMILY MEDICINE | Facility: CLINIC | Age: 83
End: 2023-01-16
Payer: COMMERCIAL

## 2023-01-16 VITALS
DIASTOLIC BLOOD PRESSURE: 73 MMHG | TEMPERATURE: 97.3 F | RESPIRATION RATE: 15 BRPM | HEART RATE: 79 BPM | WEIGHT: 205.4 LBS | BODY MASS INDEX: 34.22 KG/M2 | OXYGEN SATURATION: 94 % | SYSTOLIC BLOOD PRESSURE: 119 MMHG | HEIGHT: 65 IN

## 2023-01-16 DIAGNOSIS — Z79.899 HIGH RISK MEDICATION USE: ICD-10-CM

## 2023-01-16 DIAGNOSIS — M05.79 RHEUMATOID ARTHRITIS INVOLVING MULTIPLE SITES WITH POSITIVE RHEUMATOID FACTOR (H): ICD-10-CM

## 2023-01-16 DIAGNOSIS — F33.1 MODERATE EPISODE OF RECURRENT MAJOR DEPRESSIVE DISORDER (H): ICD-10-CM

## 2023-01-16 DIAGNOSIS — K21.9 GASTROESOPHAGEAL REFLUX DISEASE WITHOUT ESOPHAGITIS: ICD-10-CM

## 2023-01-16 DIAGNOSIS — D84.9 IMMUNOCOMPROMISED (H): ICD-10-CM

## 2023-01-16 DIAGNOSIS — S22.31XD CLOSED FRACTURE OF ONE RIB OF RIGHT SIDE WITH ROUTINE HEALING, SUBSEQUENT ENCOUNTER: Primary | ICD-10-CM

## 2023-01-16 DIAGNOSIS — E66.01 MORBID OBESITY (H): ICD-10-CM

## 2023-01-16 DIAGNOSIS — M81.0 OSTEOPOROSIS, UNSPECIFIED OSTEOPOROSIS TYPE, UNSPECIFIED PATHOLOGICAL FRACTURE PRESENCE: ICD-10-CM

## 2023-01-16 DIAGNOSIS — Z79.899 ENCOUNTER FOR LONG-TERM (CURRENT) USE OF MEDICATIONS: ICD-10-CM

## 2023-01-16 DIAGNOSIS — M54.2 NECK PAIN: ICD-10-CM

## 2023-01-16 DIAGNOSIS — F41.9 ANXIETY: ICD-10-CM

## 2023-01-16 LAB
BASOPHILS # BLD AUTO: 0 10E3/UL (ref 0–0.2)
BASOPHILS NFR BLD AUTO: 0 %
CALCIUM SERPL-MCNC: 9.2 MG/DL (ref 8.8–10.2)
CHOLEST SERPL-MCNC: 185 MG/DL
CREAT SERPL-MCNC: 0.68 MG/DL (ref 0.51–0.95)
CRP SERPL-MCNC: <3 MG/L
EOSINOPHIL # BLD AUTO: 0.2 10E3/UL (ref 0–0.7)
EOSINOPHIL NFR BLD AUTO: 3 %
ERYTHROCYTE [DISTWIDTH] IN BLOOD BY AUTOMATED COUNT: 15.1 % (ref 10–15)
ERYTHROCYTE [SEDIMENTATION RATE] IN BLOOD BY WESTERGREN METHOD: 27 MM/HR (ref 0–30)
GFR SERPL CREATININE-BSD FRML MDRD: 88 ML/MIN/1.73M2
HCT VFR BLD AUTO: 35.4 % (ref 35–47)
HDLC SERPL-MCNC: 74 MG/DL
HGB BLD-MCNC: 11 G/DL (ref 11.7–15.7)
LDLC SERPL CALC-MCNC: 86 MG/DL
LYMPHOCYTES # BLD AUTO: 1.1 10E3/UL (ref 0.8–5.3)
LYMPHOCYTES NFR BLD AUTO: 18 %
MCH RBC QN AUTO: 30.2 PG (ref 26.5–33)
MCHC RBC AUTO-ENTMCNC: 31.1 G/DL (ref 31.5–36.5)
MCV RBC AUTO: 97 FL (ref 78–100)
MONOCYTES # BLD AUTO: 0.8 10E3/UL (ref 0–1.3)
MONOCYTES NFR BLD AUTO: 12 %
NEUTROPHILS # BLD AUTO: 4.2 10E3/UL (ref 1.6–8.3)
NEUTROPHILS NFR BLD AUTO: 67 %
NONHDLC SERPL-MCNC: 111 MG/DL
PLATELET # BLD AUTO: 379 10E3/UL (ref 150–450)
RBC # BLD AUTO: 3.64 10E6/UL (ref 3.8–5.2)
TRIGL SERPL-MCNC: 124 MG/DL
WBC # BLD AUTO: 6.3 10E3/UL (ref 4–11)

## 2023-01-16 PROCEDURE — 85025 COMPLETE CBC W/AUTO DIFF WBC: CPT | Performed by: STUDENT IN AN ORGANIZED HEALTH CARE EDUCATION/TRAINING PROGRAM

## 2023-01-16 PROCEDURE — 36415 COLL VENOUS BLD VENIPUNCTURE: CPT | Performed by: STUDENT IN AN ORGANIZED HEALTH CARE EDUCATION/TRAINING PROGRAM

## 2023-01-16 PROCEDURE — 80061 LIPID PANEL: CPT | Performed by: STUDENT IN AN ORGANIZED HEALTH CARE EDUCATION/TRAINING PROGRAM

## 2023-01-16 PROCEDURE — 82306 VITAMIN D 25 HYDROXY: CPT | Performed by: STUDENT IN AN ORGANIZED HEALTH CARE EDUCATION/TRAINING PROGRAM

## 2023-01-16 PROCEDURE — 82565 ASSAY OF CREATININE: CPT | Performed by: STUDENT IN AN ORGANIZED HEALTH CARE EDUCATION/TRAINING PROGRAM

## 2023-01-16 PROCEDURE — 86140 C-REACTIVE PROTEIN: CPT | Performed by: STUDENT IN AN ORGANIZED HEALTH CARE EDUCATION/TRAINING PROGRAM

## 2023-01-16 PROCEDURE — 85652 RBC SED RATE AUTOMATED: CPT | Performed by: STUDENT IN AN ORGANIZED HEALTH CARE EDUCATION/TRAINING PROGRAM

## 2023-01-16 PROCEDURE — 82310 ASSAY OF CALCIUM: CPT | Performed by: STUDENT IN AN ORGANIZED HEALTH CARE EDUCATION/TRAINING PROGRAM

## 2023-01-16 PROCEDURE — 80076 HEPATIC FUNCTION PANEL: CPT | Performed by: STUDENT IN AN ORGANIZED HEALTH CARE EDUCATION/TRAINING PROGRAM

## 2023-01-16 PROCEDURE — 99214 OFFICE O/P EST MOD 30 MIN: CPT | Performed by: STUDENT IN AN ORGANIZED HEALTH CARE EDUCATION/TRAINING PROGRAM

## 2023-01-16 RX ORDER — BUSPIRONE HYDROCHLORIDE 10 MG/1
10 TABLET ORAL 2 TIMES DAILY
Qty: 180 TABLET | Refills: 1 | Status: CANCELLED | OUTPATIENT
Start: 2023-01-16

## 2023-01-16 RX ORDER — PANTOPRAZOLE SODIUM 40 MG/1
TABLET, DELAYED RELEASE ORAL
Qty: 90 TABLET | Refills: 1 | Status: ON HOLD | OUTPATIENT
Start: 2023-01-16 | End: 2023-02-15

## 2023-01-16 RX ORDER — OXYCODONE HYDROCHLORIDE 5 MG/1
2.5-5 TABLET ORAL DAILY PRN
Qty: 2 TABLET | Refills: 0 | Status: SHIPPED | OUTPATIENT
Start: 2023-01-16 | End: 2023-01-30

## 2023-01-16 RX ORDER — DESVENLAFAXINE 100 MG/1
100 TABLET, EXTENDED RELEASE ORAL DAILY
Qty: 90 TABLET | Refills: 1 | Status: ON HOLD | OUTPATIENT
Start: 2023-01-16 | End: 2023-02-15

## 2023-01-16 RX ORDER — TRIAMCINOLONE ACETONIDE 1 MG/G
1 OINTMENT TOPICAL DAILY
COMMUNITY
Start: 2023-01-03 | End: 2023-07-31

## 2023-01-16 RX ORDER — LIDOCAINE 4 G/G
1 PATCH TOPICAL EVERY 24 HOURS
Qty: 5 PATCH | Refills: 0 | Status: ON HOLD | OUTPATIENT
Start: 2023-01-16 | End: 2023-02-15

## 2023-01-16 ASSESSMENT — ENCOUNTER SYMPTOMS
HEADACHES: 1
PALPITATIONS: 0
NERVOUS/ANXIOUS: 1
DIZZINESS: 0
ABDOMINAL PAIN: 0
CHILLS: 0
EYE PAIN: 0
ARTHRALGIAS: 0
DIARRHEA: 0
DYSURIA: 0
BREAST MASS: 0
WEAKNESS: 0
NAUSEA: 0
HEARTBURN: 1
JOINT SWELLING: 0
CONSTIPATION: 0
MYALGIAS: 0
SHORTNESS OF BREATH: 0
HEMATURIA: 0
SORE THROAT: 0
FEVER: 0
HEMATOCHEZIA: 0
COUGH: 0
FREQUENCY: 0

## 2023-01-16 ASSESSMENT — ACTIVITIES OF DAILY LIVING (ADL)
CURRENT_FUNCTION: HOUSEWORK REQUIRES ASSISTANCE
CURRENT_FUNCTION: LAUNDRY REQUIRES ASSISTANCE

## 2023-01-16 ASSESSMENT — PAIN SCALES - GENERAL: PAINLEVEL: NO PAIN (0)

## 2023-01-16 NOTE — PROGRESS NOTES
Assessment & Plan     Closed fracture of one rib of right side with routine healing, subsequent encounter  Occurred 2 weeks ago in setting of osteoporosis (currently treated with IV zoledronic acid) and chronic PPI use. XR on 1/8/23 consistent with rib fracture.  Encouraging deep breaths/incentive spirometer and activity as tolerated. Still in significant pain thus discussed providing additional 2 tablets (5mg) of oxycodone (to take 2.5 mg) during IS use but no additional prescriptions will be given.  Discussed r/b/se of opioid use especially in setting of advanced age. Ice/heat, lidocaine, NSAIDs, acetaminophen. RTC if pain worsening or persisting after 4 weeks.  - oxyCODONE (ROXICODONE) 5 MG tablet  Dispense: 2 tablet; Refill: 0  - Lidocaine (LIDOCARE) 4 % Patch  Dispense: 5 patch; Refill: 0  - DME supply for incentive spirometer    Neck pain  Improving over the past couple of months. Normal physical exam. Most likely SCM tightness. Discussed gentle neck stretches. Continue to monitor. Consider reassessment at follow up visit.    Moderate episode of recurrent major depressive disorder (H)  Anxiety  PHQ of 7 and SPRING of 2 which is largely stable from last visit. Continue desvenlafaxine and buspirone.  - desvenlafaxine (PRISTIQ) 100 MG 24 hr tablet  Dispense: 90 tablet; Refill: 1    Gastroesophageal reflux disease without esophagitis  Chronic use of pantoprazole and sucralfate prn. Discussed r/b/se including risk of osteoporosis which is currently being treated as above. Will refill.   - pantoprazole (PROTONIX) 40 MG EC tablet  Dispense: 90 tablet; Refill: 1    Immunocompromised (H)  2/2 RA pharmacotherapy with methotrexate and Rinvoq. Follows with rheumatology.     Morbid obesity (H)  Working on lifestyle management. Lost 8 pounds intentionally by cutting out sugar recently.     Following problems treated by specialists and displayed for labs only  Encounter for long-term (current) use of medications    Rheumatoid  "arthritis involving multiple sites with positive rheumatoid factor (H)  - CBC with Platelets & Differential  - Creatinine  - Erythrocyte sedimentation rate auto  - CRP inflammation  - Hepatic function panel  - Lipid panel reflex to direct LDL Fasting    High risk medication use  - CBC with Platelets & Differential  - Creatinine  - Erythrocyte sedimentation rate auto  - CRP inflammation  - Hepatic function panel  - Lipid panel reflex to direct LDL Fasting    Osteoporosis, unspecified osteoporosis type, unspecified pathological fracture presence  - Vitamin D Deficiency  - Calcium    BMI:   Estimated body mass index is 33.92 kg/m  as calculated from the following:    Height as of this encounter: 1.657 m (5' 5.25\").    Weight as of this encounter: 93.2 kg (205 lb 6.4 oz).           No follow-ups on file.    Duy Leyva MD  [facility] [place] is a 82 year old, presenting for the following health issues:    Neck Pain and Rib Pain      HPI     Concern - Rib/head/neck pain    Description: She fractured her rib 2 weeks ago- she was turning her body to put a leash on her dog. She went to  on 1/8/23 and got pain meds for 5 days and they are gone now.    She still has a hard time breathing. And then every once in awhile she gets a cramp on the LT side of head traveling down to her neck.    Precipitating factors:        Worsened by: Breathing, movement and over activity   Alleviating factors:        Improved by: Heat and ice  Therapies tried and outcome: Tylenol and advil     Rib pain  Having some pain in the rib.  Was turning body to put leash on dog.  Now is 2 weeks out tomorrow.  Went to  on Saturday/Sunday (about 8 days ago)  Out of the pain medication now.  Pain is in the back - was the 7th rib.   Pain - using heat/ice. Not doing much.   Was given tizanidine  Took last pill of the hydrocodone yesterday or the day prior.   Initially took 3 times a day, then not as often. Switched to " "taking the tizanidine.  Tizanidine - increases the naps. Not sure if helping with the pain as she is sleeping a lot.  Really puts her out. Except at night. Still having hard time sleeping.   Not breathing well due to the rib pain - can't take a deep breath.  Overall the pain seems to be maybe improving??  Hurts to try to get up and move, and breathe.     Head cramp  Sometimes feels like a cramp in the head and in the neck too.   Doesn't last too long - maybe one minute.   Noticed getting these shortly after last time was seen.   Was occuring couple times daily, almost every day.   Neck does cramp - feels really tight.  Not occurring with activity only.   Now only once or twice a week.   No vision changes, hearing problems.    No arm/leg numbness, tingling, weakness.    Mood   Out of desvenlafaxine, needs refill today.  PHQ of 7 today.    Morbid obesity   No change in weight.   Has lost weight according to the scale.  Few weeks ago was 212 - 206, now at 204 lbs  Decreasing sugar intake.         Objective    /73 (BP Location: Right arm, Patient Position: Sitting, Cuff Size: Adult Large)   Pulse 79   Temp 97.3  F (36.3  C) (Oral)   Resp 15   Ht 1.657 m (5' 5.25\")   Wt 93.2 kg (205 lb 6.4 oz)   SpO2 94%   BMI 33.92 kg/m    Body mass index is 33.92 kg/m .     Physical Exam     GENERAL: healthy, alert and no distress  HEAD: Normocephalic, atraumatic.   EYES: PERRL. Normal conjunctivae, sclera.   ENT: Normal EAC and TMs bilaterally.  Normal oropharynx.   NECK: Supple. No lymphadenopathy appreciated. Trachea midline. Thyroid not enlarged, not TTP.  RESP: lungs clear to auscultation - no rales, rhonchi or wheezes  CV: regular rate and rhythm, normal S1 S2, no murmur, click, rub or gallop.  BACK: No spinous process tenderness. TTP over right mid thoracic back.  SKIN: no suspicious lesions or rashes.  EXT: Warm and well perfused.   NEURO: CNII-XII grossly intact. No focal deficits.  PSYCH: Groomed, dressed " appropriately for weather. Normal mood with consistent affect.

## 2023-01-17 LAB
ALBUMIN SERPL BCG-MCNC: 4.5 G/DL (ref 3.5–5.2)
ALP SERPL-CCNC: 37 U/L (ref 35–104)
ALT SERPL W P-5'-P-CCNC: 27 U/L (ref 10–35)
AST SERPL W P-5'-P-CCNC: 31 U/L (ref 10–35)
BILIRUB DIRECT SERPL-MCNC: <0.2 MG/DL (ref 0–0.3)
BILIRUB SERPL-MCNC: 0.4 MG/DL
DEPRECATED CALCIDIOL+CALCIFEROL SERPL-MC: 95 UG/L (ref 20–75)
PROT SERPL-MCNC: 7.2 G/DL (ref 6.4–8.3)

## 2023-01-18 RX ORDER — DESVENLAFAXINE 100 MG/1
TABLET, EXTENDED RELEASE ORAL
Qty: 90 TABLET | Refills: 1 | OUTPATIENT
Start: 2023-01-18

## 2023-01-18 RX ORDER — PANTOPRAZOLE SODIUM 40 MG/1
TABLET, DELAYED RELEASE ORAL
Qty: 90 TABLET | Refills: 1 | OUTPATIENT
Start: 2023-01-18

## 2023-01-20 ENCOUNTER — TELEPHONE (OUTPATIENT)
Dept: RHEUMATOLOGY | Facility: CLINIC | Age: 83
End: 2023-01-20
Payer: COMMERCIAL

## 2023-01-20 NOTE — TELEPHONE ENCOUNTER
Fax revieved from [employer]. ruths Rasuvo inj requires a pa.    Kerry MONTEMAYOR RN Specialty Triage 1/20/2023 2:04 PM

## 2023-01-20 NOTE — TELEPHONE ENCOUNTER
Fax revieved from [employer]. jesusita marieq tablet requires a PRANAY MONTEMAYOR RN Specialty Triage 1/20/2023 2:03 PM

## 2023-01-23 ENCOUNTER — MYC MEDICAL ADVICE (OUTPATIENT)
Dept: RHEUMATOLOGY | Facility: CLINIC | Age: 83
End: 2023-01-23
Payer: COMMERCIAL

## 2023-01-24 NOTE — TELEPHONE ENCOUNTER
Duplicate. Patient sent 2 [employer] messages. RN responded to other message from same day.    Patti PEREIRA, Specialty RN 1/24/2023 8:53 AM

## 2023-01-25 NOTE — TELEPHONE ENCOUNTER
Prior Authorization Approval    Authorization Effective Date: 1/25/2023  Authorization Expiration Date: 12/31/2023  Medication: rasuvo  Approved Dose/Quantity:   Reference #:     Insurance Company: [employer] Part D - Phone [phone] Fax [phone]  Expected CoPay:       CoPay Card Available:      Foundation Assistance Needed:    Which Pharmacy is filling the prescription (Not needed for infusion/clinic administered): [facility] [place], [place] - [address] [address] [facility]  Pharmacy Notified: Yes  Patient Notified: Yes

## 2023-01-25 NOTE — TELEPHONE ENCOUNTER
Central Prior Authorization Team   Phone: [phone]    PA Initiation    Medication: rasuvo  Insurance Company: [employer] Part D - Phone [phone] Fax [phone]  Pharmacy Filling the Rx: [facility] [place]  [place], [place] - [address]  Filling Pharmacy Phone: [phone]  Filling Pharmacy Fax:    Start Date: 1/25/2023

## 2023-01-30 ENCOUNTER — VIRTUAL VISIT (OUTPATIENT)
Dept: PHARMACY | Facility: CLINIC | Age: 83
End: 2023-01-30
Payer: COMMERCIAL

## 2023-01-30 DIAGNOSIS — E78.5 HYPERLIPIDEMIA LDL GOAL <100: Chronic | ICD-10-CM

## 2023-01-30 DIAGNOSIS — K21.9 GASTROESOPHAGEAL REFLUX DISEASE WITHOUT ESOPHAGITIS: Chronic | ICD-10-CM

## 2023-01-30 DIAGNOSIS — F41.9 ANXIETY: Chronic | ICD-10-CM

## 2023-01-30 DIAGNOSIS — M81.0 OSTEOPOROSIS: Primary | ICD-10-CM

## 2023-01-30 DIAGNOSIS — G89.29 OTHER CHRONIC PAIN: Chronic | ICD-10-CM

## 2023-01-30 DIAGNOSIS — J98.8 CONGESTION OF UPPER AIRWAY: ICD-10-CM

## 2023-01-30 DIAGNOSIS — T78.40XA ALLERGIES: ICD-10-CM

## 2023-01-30 DIAGNOSIS — F33.1 MODERATE EPISODE OF RECURRENT MAJOR DEPRESSIVE DISORDER (H): ICD-10-CM

## 2023-01-30 DIAGNOSIS — Z78.9 TAKES DIETARY SUPPLEMENTS: ICD-10-CM

## 2023-01-30 DIAGNOSIS — M05.79 RHEUMATOID ARTHRITIS INVOLVING MULTIPLE SITES WITH POSITIVE RHEUMATOID FACTOR (H): ICD-10-CM

## 2023-01-30 PROCEDURE — 99207 PR NO CHARGE LOS: CPT | Performed by: PHARMACIST

## 2023-01-30 RX ORDER — MULTIVIT WITH MINERALS/LUTEIN
1000 TABLET ORAL DAILY
Status: ON HOLD | COMMUNITY
End: 2023-02-15

## 2023-01-30 RX ORDER — ZINC GLUCONATE 50 MG
50 TABLET ORAL DAILY
Status: ON HOLD | COMMUNITY
End: 2023-02-15

## 2023-01-30 RX ORDER — GABAPENTIN 300 MG/1
600 CAPSULE ORAL 2 TIMES DAILY PRN
COMMUNITY
End: 2023-02-11

## 2023-01-30 RX ORDER — SENNOSIDES 8.6 MG
1300 CAPSULE ORAL DAILY
Status: ON HOLD | COMMUNITY
End: 2023-02-15

## 2023-01-30 RX ORDER — ZOLEDRONIC ACID 5 MG/100ML
5 INJECTION, SOLUTION INTRAVENOUS ONCE
COMMUNITY

## 2023-01-30 RX ORDER — NAPROXEN SODIUM 220 MG
440 TABLET ORAL DAILY PRN
Status: ON HOLD | COMMUNITY
End: 2023-02-15

## 2023-01-30 NOTE — PATIENT INSTRUCTIONS
"Recommendations from today's MTM visit:                                                    MTM (medication therapy management) is a service provided by a clinical pharmacist designed to help you get the most of out of your medicines.   Today we reviewed what your medicines are for, how to know if they are working, that your medicines are safe and how to make your medicine regimen as easy as possible.      1. Decrease vitamin D to 1000 IU (one tablet) daily since vitamin D level is high.    2. Ask Dr. Byers about his thoughts the interaction between pantoprazole and methotrexate. Pantoprazole can decrease clearance of methotrexate leading to side effects. The weekly doses for rheumatoid arthritis are less likely to cause toxicity but would be good to get his thoughts.    3.  Concentration and toxic effects (ex. mouth sores) of Rasuvo (methotrexate) may be increased when you take naproxen or any other NSAIDs, however low-dose Rasuvo for rheumatoid arthritis is less likely to result in any significant interaction. Just something you should be aware of.    4. I will ask Dr Gordillo about continuing the gabapentin and have a new prescription sent if she agrees.    5. Will discuss switching pantoprazole to famotidine with Dr. Gordillo and get back to you.     6.  Start taking 300 mg of calcium citrate at one of your meals that contains the least amount of calcium. This is in addition to your three glasses of milk each day. You should be getting 1200 mg of calcium each day.    Follow-up: Return in 12 weeks (on 4/24/2023) for MTM video visit with Alise Harley.    It was great speaking with you today.  I value your experience and would be very thankful for your time in providing feedback in our clinic survey. In the next few days, you may receive an email or text message from [employer] with a link to a survey related to your  clinical pharmacist.\"     To schedule another MTM appointment, please call the clinic directly or you may " call the [facility] scheduling line at [phone] or toll-free at [phone].     My Clinical Pharmacist's contact information:                                                      Please feel free to contact me with any questions or concerns you have.      Alise Harley PharmD  Medication Therapy Management Pharmacist  [facility] - Monday and Wednesday 7:30 - 4:00  Pager: [phone]

## 2023-01-30 NOTE — Clinical Note
I see you have been managing her osteoporosis. Patient has upcoming appointment with you and I adjusted doses of vitamin D and calcium.  Alies Harley, PharmD Medication Therapy Management Pharmacist

## 2023-01-30 NOTE — Clinical Note
I know you are on vacation this week and patient knows that she wont hear back right away.  She has been using gabapentin, prescribed by a previous doctor for her pain (rib and back) and found this very helpful.  She is not having any side effects and currently taking 600 mg of gabapentin twice daily.  I wanted to get your thoughts on continuing.  I think since she is not having side effects and renal function is good that it would be fine to continue this dose.  If you are okay with this could you please send a prescription to her pharmacy for 300 mg capsules taking 2 twice daily?  I will be gone next week and she will need a prescription before I get back.  If you prefer that she does not use gabapentin could you have someone contact her please.  Also I saw in your last that you had discussed risk benefit of using a PPI since she does have osteoporosis.  In discussing today she would like to try to get off the PPI and switch to an H2 blocker. Please let me know if you feel PPI is indicated. Thanks! Alise

## 2023-01-30 NOTE — Clinical Note
I also wanted to ask you about when you test for H. Pylori to rule that our for cause of GERD?   Alise

## 2023-01-30 NOTE — PROGRESS NOTES
Medication Therapy Management (MTM) Encounter    ASSESSMENT:                            Medication Adherence/Access: No issues identified    Rheumatoid Arthritis:  Stable and managed by rheumatology.  We did discuss the interactions between pantoprazole and methotrexate as well as NSAID use with methotrexate. The use of low-dose Methotrexate for rheumatoid arthritis along with either NSAIDs or pantoprazole is considerably less likely to result in a clinically significant interaction, however I did discuss with patient today and asked her to discuss with her rheumatologist at upcoming visit.    Back/Rib Pain: We will discuss continued gabapentin use with primary care provider since it does seem to be helping with no side effects noted.  Discussed possible side effects of gabapentin and what to watch for especially excessive tiredness, trouble finding words and dizziness.    Supplements: Stable    Hyperlipidemia: Stable.  Recommend continuing to assess risk and benefit of statin and discontinue when appropriate.    Depression/Anxiety: Stable    Congestion:  Stable    Allergies: Stable    GERD: Current treatment is effective. Chronic PPI use places patient at an increased risk of C. Diff, hypomagnesemia and lower bone mineral density, these risks were reviewed with the patient.  Patient would benefit from the following changes: trying to switch to an H2 blocker.  I do not see any indication that she needs to be on a PPI so could switch to H2 blocker.  Will discuss with primary care provider per patient request.  We did discuss modifiable changes such as diet and weight loss.  Will also discussed with primary care provider if H Pylori testing would be appropriate in this patient to rule out cause for chronic symptoms.    Osteoporosis: Patient is not meeting RDI of calcium 1200mg/day. Patient is exceeding RDI of Vitamin D 1000 IU/day. Vitamin D is not at goal 30-75ng/dL and elevated. Patient would benefit from:decreasing  vitamin D and increasing calcium, see plan. Should continue to reevaluate Reclast therapy every 3 to 5 years. Managed by Rheumatology.    PLAN:                            1. Decrease vitamin D to 1000 IU (one tablet) daily since vitamin D level is high.    2. Ask Dr. Byers about his thoughts the interaction between pantoprazole and methotrexate. Pantoprazole can decrease clearance of methotrexate leading to side effects. The weekly doses for rheumatoid arthritis are less likely to cause toxicity but would be good to get his thoughts.    3.  Concentration and toxic effects (ex. mouth sores) of Rasuvo (methotrexate) may be increased when you take naproxen or any other NSAIDs, however low-dose Rasuvo for rheumatoid arthritis is less likely to result in any significant interaction. Just something you should be aware of.    4. I will ask Dr Gordillo about continuing the gabapentin and have a new prescription sent if she agrees.    5. Will discuss switching pantoprazole to famotidine with Dr. Gordillo and get back to you.     6.  Start taking 300 mg of calcium citrate at one of your meals that contains the least amount of calcium. This is in addition to your three glasses of milk each day. You should be getting 1200 mg of calcium each day.    Follow-up: Return in 12 weeks (on 4/24/2023) for MTM video visit with Alise Harley.    SUBJECTIVE/OBJECTIVE:                          Ginger Marshall is a 82 year old female contacted via secure video for an initial visit. She was referred to me from her insurance plan. Patient not in outcomes, billing VBC.    Reason for visit: complete medication review.    Allergies/ADRs: Reviewed in chart  Past Medical History: Reviewed in chart - no CVD  Tobacco: She reports that she quit smoking about 24 years ago. Her smoking use included cigarettes. She has a 41.00 pack-year smoking history. She has never used smokeless tobacco.  Alcohol: not currently using  Assistive Devices: walker that she  uses.    Medication Adherence/Access: Patient uses pill box(es).  Patient takes medications 2 time(s) per day.   Per patient, misses medication 0 times per week.   Medication barriers: none.   The patient fills medications at [facility]: YES.    Rheumatoid Arthritis:  Current medications include: acetaminophen 1300 mg once daily (middle of day), naproxen 440 mg daily as needed once monthly, Rasuvo (methotrexate injection) 25 mg once weekly, leucovorin 5 mg once weekly 24 hours after methotrexate injection and Rinvoq 15 mg once daily.  She had been on folic acid instead of leucovorin but her rheumatologist changed her to leucovorin a couple of years when she continued to have side effects like mouth sores. Patient reports she rarely gets mouth sores anymore. She feels that this regimen is effective for her rheumatoid arthritis.    Back/Rib Pain: Current medications include: lidocaine patch only used a couple times and doesn't find helpful, acetaminophen 1300 mg once daily (middle of day), gabapentin 600 mg twice daily and naproxen 440 mg daily as needed (only uses once a month or so currently).     She was prescribed gabapentin 300 mg from the spine clinic when she was going there over a year ago. She recently cracked her rib turning wrong.  She did get a couple oxycodone from Dr. Gordillo to help with the pain but she really does not like oxycodone.  She decided to start taking the gabapentin that she had previously taken from the spine doctor.  She has found this really helpful for rib and mid back pain.  Overall she just feels better with less pain.  The gabapentin doesn't completely take care of the pain but lessens it and she is able to sleep better. She has not noticed any side effects since starting the gabapentin. She has been on gabapentin since 1/25/23.  She is wondering if she could get a new prescription since it seems to be working well with no side effects.    Last Renal Panel:  Sodium   Date Value Ref Range  Status   10/12/2022 140 136 - 145 mmol/L Final   03/27/2020 140 133 - 144 mmol/L Final     Potassium   Date Value Ref Range Status   10/12/2022 4.1 3.4 - 5.3 mmol/L Final   08/12/2022 3.9 3.4 - 5.3 mmol/L Final   03/27/2020 3.4 3.4 - 5.3 mmol/L Final     Chloride   Date Value Ref Range Status   10/12/2022 103 98 - 107 mmol/L Final   08/12/2022 111 (H) 94 - 109 mmol/L Final   03/27/2020 112 (H) 94 - 109 mmol/L Final     Carbon Dioxide   Date Value Ref Range Status   03/27/2020 24 20 - 32 mmol/L Final     Carbon Dioxide (CO2)   Date Value Ref Range Status   10/12/2022 25 22 - 29 mmol/L Final   08/12/2022 21 20 - 32 mmol/L Final     Anion Gap   Date Value Ref Range Status   10/12/2022 12 7 - 15 mmol/L Final   08/12/2022 9 3 - 14 mmol/L Final   03/27/2020 4 3 - 14 mmol/L Final     Glucose   Date Value Ref Range Status   10/12/2022 99 70 - 99 mg/dL Final   08/12/2022 105 (H) 70 - 99 mg/dL Final   03/27/2020 98 70 - 99 mg/dL Final     Comment:     Non Fasting     Urea Nitrogen   Date Value Ref Range Status   10/12/2022 8.3 8.0 - 23.0 mg/dL Final   08/12/2022 13 7 - 30 mg/dL Final   03/27/2020 18 7 - 30 mg/dL Final     Creatinine   Date Value Ref Range Status   01/16/2023 0.68 0.51 - 0.95 mg/dL Final   05/10/2021 0.59 0.52 - 1.04 mg/dL Final     GFR Estimate   Date Value Ref Range Status   01/16/2023 88 >60 mL/min/1.73m2 Final     Comment:     Effective December 21, 2021 eGFRcr in adults is calculated using the 2021 CKD-EPI creatinine equation which includes age and gender (Mari hernandez al., NEJM, DOI: 10.1056/BLNLdd9972594)   05/10/2021 87 >60 mL/min/[1.73_m2] Final     Comment:     Non  GFR Calc  Starting 12/18/2018, serum creatinine based estimated GFR (eGFR) will be   calculated using the Chronic Kidney Disease Epidemiology Collaboration   (CKD-EPI) equation.       Calcium   Date Value Ref Range Status   01/16/2023 9.2 8.8 - 10.2 mg/dL Final   03/18/2021 9.0 8.5 - 10.1 mg/dL Final     Albumin   Date Value  Ref Range Status   01/16/2023 4.5 3.5 - 5.2 g/dL Final   10/20/2022 4.1 3.4 - 5.0 g/dL Final   05/10/2021 3.6 3.4 - 5.0 g/dL Final     Estimated Creatinine Clearance: 74.8 mL/min (based on SCr of 0.68 mg/dL).     Calculated creatinine clearance based on adjusted body weight: 64.1 ml/min    Supplements: Currently taking zinc 50 mg once daily and vitamin C 1000 mg once daily. No reported issues at this time. .    Hyperlipidemia: Current therapy includes atorvastatin 40 mg daily.  Patient reports no significant myalgias or other side effects.  Patient reports due to her [place] background her cholesterol tends to run higher.  She is wondering how long she would have to continue the atorvastatin.    Recent Labs   Lab Test 01/16/23  1122 12/08/21  1053   CHOL 185 184   HDL 74 77   LDL 86 87   TRIG 124 101     Lab Results   Component Value Date    AST 31 01/16/2023    AST 22 05/10/2021     Lab Results   Component Value Date    ALT 27 01/16/2023    ALT 36 05/10/2021     Depression/Anxiety:  Current medications include: desvenlafaxine 100 mg once daily and buspirone 10 mg twice daily. She feels less depressed and anxiety. She didn't realize that it was helping until a recent stressor happen and she handled it well.  PHQ-9 SCORE 12/5/2022 12/12/2022 1/9/2023   PHQ-9 Total Score - - -   PHQ-9 Total Score MyChart 9 (Mild depression) 7 (Mild depression) 7 (Mild depression)   PHQ-9 Total Score 9 7 7     SPRING-7 SCORE 7/6/2022 8/5/2022 11/16/2022   Total Score 2 (minimal anxiety) 5 (mild anxiety) 2 (minimal anxiety)   Total Score 2 5 2     Congestion:  Current medications include: guaifenesin 600 mg once daily.  She feels that taking this keeps her throat clear. She has had this going on for years.  She has not seen a pulmonologist. She did have a pulmonary function test in the past, couple of years ago.     Allergies: Current medications include loratadine 10 mg twice daily.  She has to take twice daily otherwise itching in  the morning. Primary symptoms are skin and scalp itching. 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 Patient feels that current therapy is effective.     GERD: Current medications include: Protonix (pantoprazole) 40 mg once daily and sucralfate 1 gm four times daily as needed (only takes once daily a couple times a month currently). Patient reports no current symptoms.   The patient does not have a history of GI bleed. She used to take Excedrin a lot and ended up with ulcers. That is why the PP was started.   Recently she has cut out a lot of sweets. She usually has ice cream with chocolate sauce. She is eating more fruit. She is losing weight which makes her happy. She does drink coffee and she loves oranges. Other than that she doesn't eat a lot of acidic foods.   She has not been tested for H. Pylori.  The patient does notice symptoms if they miss a dose.  Patient has not tried a trial off of therapy and is interested in doing so. Patient feels that current regimen is effective.    Osteoporosis: Current therapy includes: calcium citrate 600 mg (has not been taking due to size and thought it should be taken alone), Vitamin D 2000 IU daily and zoledronic Acid (Reclast/Zometa) 5 mg IV annual  (has been on current therapy for almost 2 years). Next scheduled for March. Patient is not experiencing side effects. Per Dr. Byers's note from 8/2/22 copied forward- was previously managed by endocrinology and she received reclast once in 2013, 2014, 2016. 12/12/2018 DEXA ordered by Dr. Vázquez showed osteoporosis. Repeat DEXA on 2/2/2022 showed osteoporosis; no significant change of the hips; forearm osteoporosis but no previous evaluation of the forearm for comparison. Reclast was restarted after sufficient drug holiday, with the first dose on 3/18/2021; again received in 3/21/2022; plan to continue yearly for now.     DEXA History: 2/2/22  Patient is getting the following sources of dietary calcium: milk  (8ounces three times daily) and cheese daily  Risk factors: post-menopausal  chronic PPI use  Last vitamin D level:  Lab Results   Component Value Date    VITDT 95 (H) 01/16/2023    VITDT 62 10/13/2021    VITDT 63 08/18/2021    VITDT 48 12/18/2020    VITDT 41 03/06/2019     Today's Vitals: There were no vitals taken for this visit.  ----------------    I spent 64 minutes with this patient today. All changes were made via collaborative practice agreement with Duy Leyva MD. A copy of the visit note was provided to the patient's provider(s).    A summary of these recommendations was sent via [employer].    Alise Harley, PharmD  Medication Therapy Management Pharmacist    Telemedicine Visit Details  Type of service:  Video Conference via [employer]  Start Time: 11:08 AM  End Time: 11:12 AM     Medication Therapy Recommendations  GERD (gastroesophageal reflux disease)    Current Medication: pantoprazole (PROTONIX) 40 MG EC tablet   Rationale: Undesirable effect - Adverse medication event - Safety   Recommendation: Change Medication - famotidine 20 MG tablet - 20 mg twice daily   Status: Contact Provider - Awaiting Response         Osteoporosis    Current Medication: CALCIUM CITRATE PO   Rationale: Dose too low - Dosage too low - Effectiveness   Recommendation: Increase Dose - CALCIUM CITRATE   Status: Accepted - no CPA Needed   Note: Add 300 mg daily to get recommended dose of 1200 mg daily.          Current Medication: Cholecalciferol (VITAMIN D-3 PO)   Rationale: Dose too high - Dosage too high - Safety   Recommendation: Decrease Dose - Vitamin D3 25 mcg (1000 units) tablet   Status: Accepted - no CPA Needed

## 2023-02-06 ENCOUNTER — VIRTUAL VISIT (OUTPATIENT)
Dept: RHEUMATOLOGY | Facility: CLINIC | Age: 83
End: 2023-02-06
Payer: COMMERCIAL

## 2023-02-06 DIAGNOSIS — J34.89 NASAL SORE: ICD-10-CM

## 2023-02-06 DIAGNOSIS — M05.79 RHEUMATOID ARTHRITIS INVOLVING MULTIPLE SITES WITH POSITIVE RHEUMATOID FACTOR (H): ICD-10-CM

## 2023-02-06 PROCEDURE — 99214 OFFICE O/P EST MOD 30 MIN: CPT | Mod: 95 | Performed by: INTERNAL MEDICINE

## 2023-02-06 RX ORDER — UPADACITINIB 15 MG/1
15 TABLET, EXTENDED RELEASE ORAL DAILY
Qty: 30 TABLET | Refills: 6 | Status: ON HOLD | OUTPATIENT
Start: 2023-02-06 | End: 2023-02-15

## 2023-02-06 RX ORDER — IPRATROPIUM BROMIDE 42 UG/1
SPRAY, METERED NASAL
Qty: 15 ML | Refills: 1 | Status: ON HOLD | OUTPATIENT
Start: 2023-02-06 | End: 2023-02-15

## 2023-02-06 RX ORDER — LEUCOVORIN CALCIUM 5 MG/1
5 TABLET ORAL
Qty: 13 TABLET | Refills: 2 | Status: ON HOLD | OUTPATIENT
Start: 2023-02-06 | End: 2023-02-15

## 2023-02-06 RX ORDER — METHOTREXATE 25 MG/.5ML
25 INJECTION, SOLUTION SUBCUTANEOUS
Qty: 2 ML | Refills: 6 | Status: ON HOLD | OUTPATIENT
Start: 2023-02-06 | End: 2023-02-15

## 2023-02-06 NOTE — PROGRESS NOTES
Ginger Marshall  is a 82 year old year old who is being evaluated via a billable video visit.      How would you like to obtain your AVS? MyChart  If the video visit is dropped, the invitation should be resent by: Text to cell phone: [phone]   Will anyone else be joining your video visit? No     Rheumatology Video Visit      Ginger Marshall MRN# [id number]   YOB: 1940 Age: 82 year old      Date of visit: 2/06/23   PCP: [facility]     Chief Complaint   Patient presents with:  Rheumatoid Arthritis    Assessment and Plan     1. Seropositive Erosive Rheumatoid Arthritis ( [2009], CCP >100 [2009]; hx of rheumatoid nodule by 6/14/2011 pathology): Previously failed remicade (staph infection during therapy, but was immediately after a joint injection), orencia (migraines), HCQ (ineffective), Xeljanz (partially effective).  Sulfa allergy.  Currently on methotrexate 25 mg SQ once weekly (dose reduction in the past resulted in more symptoms), leucovorin 5 mg weekly (resolved nasal sore that didn't improve with higher daily folic acid doses), and Rinvoq 15mg daily.  RA well controlled since changing to Rinvoq.  Chronic illness, stable.    - Continue methotrexate 25mg SQ once weekly (Rasuvo)  - Continue leucovorin 5 mg every 7 days, 24 hours after MTX dose.   - Continue Rinvoq 15 mg daily  - Fasting labs in 3 months: CBC, Creatinine, Hepatic Panel, ESR, CRP, lipid panel    High risk medication requiring intensive toxicity monitoring at least quarterly: labs ordered include CBC, Creatinine, Hepatic panel to monitor for cytopenia and hepatotoxicity; checking creatinine as it affects clearance of medication.      2. 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  Doing okay at this time.     3. Right hip pain: Status post WALTER twice in the past. Followed by [facility] orthopedics as needed.  Symptoms are degenerative in  nature.    4. 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 Michele who is now at [facility].  Has been seen at [facility] and [place] [facility].  She previously reported that a TENS unit was helpful, but reported that it was not helpful.  She does not want additional lumbar spine injections or back surgery.  She may follow-up with her spine surgeon or primary care provider for this issue, as needed.    5.  Thoracic back pain: Degenerative in nature.  And degenerative changes seen on 2021 chest CT.  Continue physical therapy exercises at home, as these are helpful.    6. Osteoporosis: was previously managed by endocrinology and she received reclast once in 2013, 2014, 2016. 12/12/2018 DEXA ordered by Dr. Vázquez showed osteoporosis.  Repeat DEXA on 2/2/2022 showed osteoporosis; no significant change of the hips; forearm osteoporosis but no previous evaluation of the forearm for comparison.  Reclast was restarted after sufficient drug holiday, with the first dose on 3/18/2021; again received in 3/21/2022; and the next dose to be on 3/21/2023 or shortly thereafter.  Plan to recheck DEXA in February 2024.  Note that recently she was evaluated by pharmacist and found to be taking vitamin D 14,000 IU daily; she has since reduced this to 1000 IU daily.  Therefore we will recheck vitamin D in 3 months.  Also with discussion from the pharmacist she is now consuming calcium by taking 300 mg daily via supplement, and then the rest of the calcium is from her food..   Chronic illness, stable.      - Reclast once yearly; next due on 3/21/2023  - Continue vitamin D 1000 IU daily  - Continue calcium 1200mg daily from supplement and dietary sources  - Labs in 3 months: Calcium, vitamin D    7. Left 1st toe pain, history: 2 episodes of sudden onset left toe pain followed by diffuse left foot pain that made ambulation difficult.  Also with nodules over both Achilles  tendons and the right elbow that are either rheumatoid nodules or tophi.  She reports having history of gout decades ago.  Denies ever being on colchicine or allopurinol.  Discussed colchicine to use if suspected gout flare occurs.  No flares since last seen so has not used colchicine.  - Colchicine: (MITIGARE) 0.6 MG capsule; Take 2 capsules (1.2 mg) by mouth once for 1 dose at the onset of a gout flare, followed by 1 capsule (0.6mg) 1 hour later.      8. Chronic pain syndrome: Documented here for historical significance only.   Management per pain clinic and/or PCP    9.  Vaccinations: Vaccinations reviewed with Ms. Marshall.  Risks and benefits of vaccinations were discussed.    - Influenza: encouraged yearly vaccination, and to hold methotrexate for 2 weeks afterward  - [id number]: up to date  - [id number]: up to date  - Shingrix: Up to date   - COVID-19: Advised updating, and to hold methotrexate and Rinvoq for 2 weeks afterward    10.  Depression: Lack of interest in activities, reduce energy, reduced appetite.  Symptoms are concerning for continued depression and Ginger agrees.  She will continue following with her primary care provider and psychologist for depression management.     Total minutes spent in evaluation with patient, documentation, , and review of pertinent studies and chart notes: 34     Ms. Marshall verbalized agreement with and understanding of the rational for the diagnosis and treatment plan.  All questions were answered to best of my ability and the patient's satisfaction. Ms. Marshall was advised to contact the clinic with any questions that may arise after the clinic visit.      Thank you for involving me in the care of the patient    Return to clinic: 3-4 months    HPI   Ginger Marshall is a 82 year old female with a history of multiple foot surgeries, hand tendon transfers, MCP replacements (most recent being in August 2015), bilateral TKA, right WALTER, left distal radius fracture  history, gout, s/p lumbar fusion, osteoporosis and seropositive (RF+, CCP+) erosive rheumatoid arthritis who presents for follow-up of rheumatoid arthritis.      Today, 2/6/2023: Ginger reports that her rheumatoid thrice is well controlled.  Still with back pain that is worse with activity and improves with rest; she does exercises on her own at home.  Does not have much motivation to do things, does not enjoy doing things that she used to enjoy, wakes up tired, lack of energy, does not find much interest in eating so only eats when she feels like she has to but does not enjoy cooking.  Is seeing a psychologist and her primary care provider for depression management.    Denies fevers, chills, nausea, vomiting, constipation, diarrhea. No abdominal pain. No chest pain/pressure, palpitations, or shortness of breath. No oral or nasal sores.   No rash. No LE swelling.     Tobacco: quit in 1999  EtOH: 1 glass of wine every month at most  Drugs: None  Occupation: , retired     ROS   12 point review of system was completed and negative except as noted in the HPI     Active Problem List     Patient Active Problem List   Diagnosis     Rheumatoid arthritis involving right hand with positive rheumatoid factor (H)     History of colonic polyps     Multinodular goiter     CARDIOVASCULAR SCREENING; LDL GOAL LESS THAN 130     Pulmonary nodule     PSEUDOPHAKIA OU     PVD (POSTERIOR VITREOUS DETACHMENT) OU     PXF (PSEUDOEXFOLIATION OF LENS CAPSULE) OD     GERD (gastroesophageal reflux disease)     Hyperlipidemia LDL goal <100     Advance Care Planning     Neuropathy     SHERRY (obstructive sleep apnea)-severe (AHI 35)     zEncounter for counseling     Anemia of chronic disease     Osteoporosis     Cornea guttata, ou     Conjunctival concretions     Stenosis, spinal, lumbar     Chronic pain     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     MGD (meibomian gland dysfunction)     Blepharitis of both eyes      Personal history of healed osteoporosis fracture     Iron malabsorption     Hyperglycemia     Chronic bilateral low back pain without sciatica     Allergic state, subsequent encounter     Anxiety     History of depression     DDD (degenerative disc disease), lumbar     Chronic right shoulder pain     Moderate episode of recurrent major depressive disorder (H)     Rheumatic mitral stenosis     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     History of bisphosphonate therapy     Morbid obesity (H)     Immunocompromised (H)     Thoracic spine pain     Past Medical History     Past Medical History:   Diagnosis Date     Acute posthemorrhagic anemia 10/13/2012     Closed fracture of multiple ribs of left side, initial encounter 2019     COPD (chronic obstructive pulmonary disease) (H)     no longer occurring     Ex-smoker 1999     Gastroenteritis 2020    Gastroenteritis with norovirus     Herniated nucleus pulposus, L3-4 3/1/2017     Hip joint replacement by other means 07/10/2008     History of blood transfusion      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     History of total knee replacement 2009     Menopause 1989    late 40's     Migraine 2014    resolved     Multinodular goiter      Other chronic pain     joints     Pelvic fracture (H) 2014     Rheumatic mitral stenosis      Rheumatoid arteritis (H)      S/P lumbar fusion 2017     Sleep apnea      Vitamin B12 deficiency      Past Surgical History     Past Surgical History:   Procedure Laterality Date     ABDOMEN SURGERY      c sections     ARTHROSCOPY KNEE Left      ARTHROSCOPY KNEE RT/LT  2006    left     BACK SURGERY  2013    disc     BIOPSY BREAST       BREAST SURGERY      lumpectomy 's     BREAST SURGERY      lumpectomy     CATARACT EXTRACTION Bilateral      CATARACT IOL, RT/LT Bilateral 2010 aproximately      SECTION  1966      SECTION  1972      "COLONOSCOPY  2009 2006,2009     COLONOSCOPY       FINGER SURGERY Right 8/20/2019    Procedure: DISTAL ULNA RESECTION, RIGHT MIDDLE SILASTIC METACARPALPHALANGEAL EXCHANGE AND RIGHT ELBOW NODULE EXCISION.;  Surgeon: Hilario Redmond MD;  Location: [facility];  Service: Orthopedics     FOOT SURGERY Left      GYN SURGERY  [age]     HAND SURGERY Right      HAND SURGERY Right      HC ESOPH/GAS REFLUX TEST W NASAL IMPED >1 HR  02/01/2012    Procedure:ESOPHAGEAL IMPEDENCE FUNCTION TEST WITH 24 HOUR PH GREATER THAN 1 HOUR; Surgeon:KAYKAY JULIO; Location:[facility]     IR LUMBAR EPIDURAL STEROID INJECTION       IR TRANSLAMINAR EPIDURAL LUMBAR INJ INCL IMAGING  05/02/2012    LESI L5-S1 at [facility]     LUMBAR FUSION       OPTICAL TRACKING SYSTEM FUSION POSTERIOR SPINE LUMBAR N/A 04/03/2017    Procedure: OPTICAL TRACKING SYSTEM FUSION SPINE POSTERIOR LUMBAR ONE LEVEL;  Surgeon: Anthony Michele MD;  Location:  OR     ORTHOPEDIC SURGERY  1991    left foot surgery     ORTHOPEDIC SURGERY  1995    R MCP surgery     ORTHOPEDIC SURGERY  2007    right knee total replacement     TOTAL HIP ARTHROPLASTY Right      TOTAL KNEE ARTHROPLASTY Left      TOTAL KNEE ARTHROPLASTY Right      ZZC ANESTH,TOTAL HIP ARTHROPLASTY  2010    Rt hip     ZZC HAND/FINGER SURGERY UNLISTED  11/2005    right hand     ZZC TOTAL KNEE ARTHROPLASTY  2006    left     Allergy     Allergies   Allergen Reactions     Abatacept Other (See Comments)     Severe headaches  Migraine     Celebrex [Celecoxib]      Ineffective     Celecoxib Unknown and Nausea     Ethanol      Antihistamines     Levaquin [Levofloxacin] Fatigue     Severe body pain     Orencia [Abatacept]      Headache     Septra [Bactrim]      Sulfa Drugs Nausea and Vomiting     \"deathly ill\"  Allergic to everything with Sulfa in it.     Sulfamethoxazole-Trimethoprim Nausea and Nausea and Vomiting     Tramadol Other (See Comments)     Headache  Migraine headache     Valdecoxib Unknown and " "Nausea     Made me \"very very ill\", might of been \"cramping\"     Adhesive Tape Rash     Plastic tape  Plastic tapes     Antihistamines, Chlorpheniramine-Type  [Alkylamines] Anxiety and Other (See Comments)     Current Medication List     Current Outpatient Medications   Medication Sig     acetaminophen (TYLENOL) 650 MG CR tablet Take 1,300 mg by mouth daily     atorvastatin (LIPITOR) 40 MG tablet Take 1 tablet (40 mg) by mouth daily     busPIRone (BUSPAR) 10 MG tablet Take 1 tablet (10 mg) by mouth 2 times daily     Cholecalciferol (VITAMIN D-3 PO) Take 1,000 Units by mouth daily     desvenlafaxine (PRISTIQ) 100 MG 24 hr tablet Take 1 tablet (100 mg) by mouth daily     gabapentin (NEURONTIN) 300 MG capsule Take 600 mg by mouth 2 times daily as needed     guaiFENesin (MUCINEX) 600 MG 12 hr tablet Take 1 tablet (600 mg) by mouth 2 times daily     leucovorin (WELLCOVORIN) 5 MG tablet Take 1 tablet (5 mg) by mouth every 7 days . Take 24 hours after taking Methotrexate each week.     loratadine (CLARITIN) 10 MG tablet Take 10 mg by mouth 2 times daily     Methotrexate, PF, (RASUVO) 25 MG/0.5ML autoinjector Inject 0.5 mLs (25 mg) Subcutaneous every 7 days . Hold for signs of infection, and seek medical attention.     naproxen sodium (ANAPROX) 220 MG tablet Take 440 mg by mouth daily as needed for moderate pain (4-6) (less than monthly use currently)     pantoprazole (PROTONIX) 40 MG EC tablet TAKE ONE TABLET BY MOUTH ONCE DAILY 30-60 MINUTES BEFORE A MEAL     sucralfate (CARAFATE) 1 GM tablet TAKE ONE TABLET BY MOUTH FOUR TIMES A DAY AS NEEDED HEATBURN     triamcinolone (KENALOG) 0.1 % external ointment Apply 1 applicator topically daily     Upadacitinib ER (RINVOQ) 15 MG TB24 Take 15 mg by mouth daily     vitamin C (ASCORBIC ACID) 1000 MG TABS Take 1,000 mg by mouth daily     zinc gluconate 50 MG tablet Take 50 mg by mouth daily     zoledronic Acid (RECLAST) 5 MG/100ML SOLN infusion Inject 5 mg into the vein once Every " year (next dose 2023)     albuterol (PROAIR HFA/PROVENTIL HFA/VENTOLIN HFA) 108 (90 Base) MCG/ACT inhaler Inhale 2 puffs into the lungs every 6 hours (Patient not taking: Reported on 2023)     CALCIUM CITRATE PO Take 300 mg by mouth daily (Patient not taking: Reported on 2023)     hydrocortisone, Perianal, (HYDROCORTISONE) 2.5 % cream Place rectally 2 times daily as needed for hemorrhoids (Patient not taking: Reported on 2023)     Lidocaine (LIDOCARE) 4 % Patch Place 1 patch onto the skin every 24 hours To prevent lidocaine toxicity, patient should be patch free for 12 hrs daily. (Patient not taking: Reported on 2023)     valACYclovir (VALTREX) 1000 mg tablet Take 1 tablet (1,000 mg) by mouth 2 times daily for 5 days Start at first sign of outbreak     No current facility-administered medications for this visit.     Social History   See HPI    Family History     Family History   Problem Relation Age of Onset     Arthritis Mother      Alzheimer Disease Mother      Hyperlipidemia Mother      Osteoporosis Mother      Heart Disease Father         MI ( from this)     Alcohol/Drug Father      Arthritis Sister      Hypertension Sister      Other Cancer Sister         Luekemia     Cancer Son      Diabetes Son         type 1     Neurologic Disorder Sister         Schizophrenic     Hypertension Sister      Hyperlipidemia Sister      Mental Illness Sister      Diabetes Son      Other Cancer Son      Substance Abuse Son      Glaucoma Daughter      Diabetes Other         type 2     Hyperlipidemia Other      No change in family history since the previous clinic visit.    Physical Exam     Temp Readings from Last 3 Encounters:   23 97.3  F (36.3  C) (Oral)   23 97.3  F (36.3  C) (Tympanic)   22 97.6  F (36.4  C) (Oral)     BP Readings from Last 5 Encounters:   23 119/73   23 (!) 142/64   22 116/78   22 114/64   10/25/22 110/72     Pulse Readings from Last 1  "Encounters:   01/16/23 79     Resp Readings from Last 1 Encounters:   01/16/23 15     Estimated body mass index is 33.92 kg/m  as calculated from the following:    Height as of 1/16/23: 1.657 m (5' 5.25\").    Weight as of 1/16/23: 93.2 kg (205 lb 6.4 oz).      GEN: NAD.   HEENT:  Anicteric, noninjected sclera. No obvious external lesions of the ear and nose. Hearing intact.  PULM: No increased work of breathing  MSK:  Hands and wrists without swelling.   SKIN: No rash or jaundice seen  PSYCH: Alert. Appropriate.        Labs       CBC  Recent Labs   Lab Test 01/16/23  1122 10/20/22  1256 10/12/22  1212 08/18/21  1054 05/10/21  1023 02/16/21  1019 11/16/20  1028   WBC 6.3 5.9 5.4   < > 5.0 5.7 5.7   RBC 3.64* 3.75* 3.85   < > 3.39* 3.81 3.91   HGB 11.0* 11.4* 11.7   < > 11.3* 12.5 12.0   HCT 35.4 37.3 37.7   < > 35.6 38.9 37.8   MCV 97 100 98   < > 105* 102* 97   RDW 15.1* 15.3* 15.3*   < > 14.3 15.2* 16.1*    367 320   < > 333 335 361   MCH 30.2 30.4 30.4   < > 33.3* 32.8 30.7   MCHC 31.1* 30.6* 31.0*   < > 31.7 32.1 31.7   NEUTROPHIL 67 62 57   < > 57.7 65.8 60.5   LYMPH 18 18 16   < > 18.5 17.0 20.4   MONOCYTE 12 15 21   < > 17.3 12.1 12.8   EOSINOPHIL 3 4 5   < > 6.3 4.9 6.1   BASOPHIL 0 0 0   < > 0.2 0.2 0.2   ANEU  --   --   --   --  2.9 3.8 3.5   ALYM  --   --   --   --  0.9 1.0 1.2   MARYURI  --   --   --   --  0.9 0.7 0.7   AEOS  --   --   --   --  0.3 0.3 0.4   ABAS  --   --   --   --  0.0 0.0 0.0   ANEUTAUTO 4.2 3.6 3.1   < >  --   --   --    ALYMPAUTO 1.1 1.1 0.9   < >  --   --   --    AMONOAUTO 0.8 0.9 1.1   < >  --   --   --    AEOSAUTO 0.2 0.3 0.3   < >  --   --   --    ABSBASO 0.0 0.0 0.0   < >  --   --   --     < > = values in this interval not displayed.     CMP  Recent Labs   Lab Test 01/16/23  1122 10/20/22  1256 10/12/22  1212 08/12/22  1109 08/18/21  1054 05/10/21  1023 03/18/21  1350 02/16/21  1019 12/18/20  0923 11/16/20  1028 05/22/20  1026 03/27/20  1413   NA  --   --  140 141  --   --   " --   --   --   --   --  140   POTASSIUM  --   --  4.1 3.9  --   --   --   --   --   --   --  3.4   CHLORIDE  --   --  103 111*  --   --   --   --   --   --   --  112*   CO2  --   --  25 21  --   --   --   --   --   --   --  24   ANIONGAP  --   --  12 9  --   --   --   --   --   --   --  4   GLC  --   --  99 105*  --   --   --   --   --   --   --  98   BUN  --   --  8.3 13  --   --   --   --   --   --   --  18   CR 0.68 0.61 0.64 0.68   < > 0.59  --  0.62  --  0.60   < > 0.57   GFRESTIMATED 88 90 89 88   < > 87  --  86  --  87   < > 89   GFRESTBLACK  --   --   --   --   --  >90  --  >90  --  >90   < > >90   BRANDEE 9.2  --  9.4 9.0   < >  --    < >  --    < >  --   --  9.1   BILITOTAL 0.4 0.4 0.4  --    < > 0.3  --  0.4  --  0.4   < >  --    ALBUMIN 4.5 4.1 4.1  --    < > 3.6  --  4.2  --  4.1   < >  --    PROTTOTAL 7.2 7.8 7.5  --    < > 7.0  --  8.0  --  7.6   < >  --    ALKPHOS 37 46 38  --    < > 45  --  44  --  47   < >  --    AST 31 26 32  --    < > 22  --  21  --  25   < >  --    ALT 27 38 37*  --    < > 36  --  34  --  36   < >  --     < > = values in this interval not displayed.     Calcium/VitaminD  Recent Labs   Lab Test 01/16/23  1122 10/12/22  1212 08/12/22  1109 03/21/22  1041 10/13/21  1447 08/18/21  1054   BRANDEE 9.2 9.4 9.0   < > 9.0 9.5   VITDT 95*  --   --   --  62 63    < > = values in this interval not displayed.     ESR/CRP  Recent Labs   Lab Test 01/16/23  1122 10/20/22  1256 07/12/22  1112 04/29/22  1000 02/04/22  0832 12/08/21  1053   SED 27 31* 15 15 14 16   CRP  <3  --   --  <2.9 <2.9 <2.9     Lipid Panel  Recent Labs   Lab Test 01/16/23  1122 12/08/21  1053 11/27/20  1043   CHOL 185 184 188   TRIG 124 101 142   HDL 74 77 89   LDL 86 87 71   NHDL 111 107 99     Hepatitis B  Recent Labs   Lab Test 10/20/22  1256 09/15/15  1159   AUSAB  --  0.11   HBCAB Nonreactive Nonreactive   HEPBANG Nonreactive Nonreactive     Hepatitis C  Recent Labs   Lab Test 10/20/22  1256 09/15/15  1159   HCVAB Nonreactive  Nonreactive   Assay performance characteristics have not been established for newborns,   infants, and children         Tuberculosis Screening  Recent Labs   Lab Test 10/20/22  1256 08/18/21  1054 05/07/18  1033 09/15/15  1200   TBRES Negative Negative  --   --    TBRSLT  --   --  Negative Negative   TBAGN  --   --  0.00 0.00       Immunization History     Immunization History   Administered Date(s) Administered     COVID-19 Vaccine 12+ (Pfizer) 02/09/2021, 03/03/2021     FLU 6-35 months 10/24/2006, 11/14/2007, 10/22/2008, 10/21/2009     Flu, Unspecified 10/20/2013     Influenza (High Dose) 3 valent vaccine 10/28/2013, 09/23/2014, 11/11/2015, 09/23/2016, 09/24/2019     Influenza (IIV3) PF 10/12/1999, 10/26/2001, 10/19/2002, 10/30/2003, 10/28/2004, 10/21/2005, 10/26/2007, 10/21/2009, 10/05/2011, 10/13/2012     Influenza Vaccine 65+ (Fluzone HD) 10/07/2021, 11/16/2022     Influenza Vaccine >6 months (Alfuria,Fluzone) 09/24/2020     Influenza Vaccine Im 4yrs+ 4 Valent CCIIV4 09/28/2017, 09/27/2018     Mantoux Tuberculin Skin Test 11/03/2006     Pneumo Conj 13-V (2010&after) 07/29/2015     Pneumococcal 23 valent 06/26/2000, 10/26/2001, 06/01/2007, 06/02/2010     TD (ADULT, 7+) 12/10/2008, 07/20/2009     Tdap (Adult) Unspecified Formulation 12/12/2018     Zoster vaccine recombinant adjuvanted (SHINGRIX) 12/12/2018, 02/14/2019          Chart documentation done in part with Dragon Voice recognition Software. Although reviewed after completion, some word and grammatical error may remain.      Video-Visit Details    Type of service:  Video Visit    Video Start Time: 10:41 AM    Video End Time:11:08 AM    Originating Location (pt. Location): Home, [place]    Distant Location (provider location):  Off-site, [place]    Platform used for Video Visit: Gordo Byers MD

## 2023-02-06 NOTE — PATIENT INSTRUCTIONS
RHEUMATOLOGY    Dr. Nico Byers    [facility]  [address] [place] [address]  [place] [address]  Phone number: [phone]  Fax number: [phone]    You may schedule your FLU shot by calling [phone] or if you would like to get your shot at a [facility] pharmacy you may schedule online at [email or web address].    Thank you for choosing [facility]!    Samantha Morales CMA Rheumatology

## 2023-02-07 DIAGNOSIS — S22.31XD CLOSED FRACTURE OF ONE RIB OF RIGHT SIDE WITH ROUTINE HEALING, SUBSEQUENT ENCOUNTER: Primary | ICD-10-CM

## 2023-02-07 RX ORDER — GABAPENTIN 300 MG/1
300 CAPSULE ORAL 2 TIMES DAILY
Qty: 60 CAPSULE | Refills: 0 | Status: ON HOLD | OUTPATIENT
Start: 2023-02-07 | End: 2023-02-15

## 2023-02-07 NOTE — TELEPHONE ENCOUNTER
Called pt and advised of below per Dr Duy Leyva.  Pt verbalized understanding and agreeable to plan.    Lashonda Ly RN, BSN  [facility]

## 2023-02-07 NOTE — TELEPHONE ENCOUNTER
Reviewed MTM note.    Agree with continuing gabapentin BID for rib fracture pain instead of oxycodone. Sent in one month prescription to  pharmacy here in [place]. Please let her know to decrease prescription as pain begins to subside from the rib fracture.     Thanks,    Duy Leyva MD  [facility]  2/7/2023

## 2023-02-08 ENCOUNTER — VIRTUAL VISIT (OUTPATIENT)
Dept: PSYCHOLOGY | Facility: CLINIC | Age: 83
End: 2023-02-08
Payer: COMMERCIAL

## 2023-02-08 DIAGNOSIS — F33.1 MODERATE EPISODE OF RECURRENT MAJOR DEPRESSIVE DISORDER (H): Primary | ICD-10-CM

## 2023-02-08 PROCEDURE — 90834 PSYTX W PT 45 MINUTES: CPT | Mod: 95

## 2023-02-08 NOTE — PROGRESS NOTES
[facility]                                     Progress Note    Patient Name: Ginger Marshall  Date: 2/8/23         Service Type: Individual      Session Start Time: 10:00 am  Session End Time: 10:45 am     Session Length: 45 min    Session #: 4    Attendees: Client attended alone    Service Modality:  Video Visit:      Provider verified identity through the following two step process.  Patient provided:  Patient is known previously to provider    Telemedicine Visit: The patient's condition can be safely assessed and treated via synchronous audio and visual telemedicine encounter.      Reason for Telemedicine Visit: Patient convenience (e.g. access to timely appointments / distance to available provider)    Originating Site (Patient Location): Patient's home    Distant Site (Provider Location): Provider Remote Setting- Home Office    Consent:  The patient/guardian has verbally consented to: the potential risks and benefits of telemedicine (video visit) versus in person care; bill my insurance or make self-payment for services provided; and responsibility for payment of non-covered services.     Patient would like the video invitation sent by:  My Chart    Mode of Communication:  [facility]    Distant Location (Provider):  Off-site    As the provider I attest to compliance with applicable laws and regulations related to telemedicine.    DATA  Interactive Complexity: No  Crisis: No        Progress Since Last Session (Related to Symptoms / Goals / Homework):   Symptoms: No change continued depression, grief/loss    Homework: Partially completed      Episode of Care Goals: Minimal progress - PREPARATION (Decided to change - considering how); Intervened by negotiating a change plan and determining options / strategies for behavior change, identifying triggers, exploring social supports, and working towards setting a date to begin behavior change     Current / Ongoing Stressors and  Concerns:  Adult son who was blind and lived alone fell and passed away recently, second loss of a child this year. Noticing regrets about parenting. Distress regarding sister with Alzheimer's who has left local residence and is not in contact. Limited food intake, ability to get things done around the house.     Treatment Objective(s) Addressed in This Session:    Practice boundaries and radical acceptance of reality regarding sister  Improve quantity and quality of night time sleep / decrease daytime naps     Intervention:      DBT:  Reviewed radical acceptance and boundaries to support mental health  Provided active listening and validation. Processed sadness and loss, surfaced impact of son on others.  Reflected on behavioral patterns and regrets over time as a parent.     Motivational Interviewing  Target Behavior: diet/weight loss    Stage of Change: ACTION (Actively working towards change)    MI Intervention: Expressed Empathy/Understanding, Supported Autonomy, Collaboration, Evocation and Open-ended questions     Change Talk Expressed by the Patient: Desire to change Reasons to change Activation    Provider Response to Change Talk: E - Evoked more info from patient about behavior change and A - Affirmed patient's thoughts, decisions, or attempts at behavior change    Assessments completed prior to visit:  LAURA  12/5/22  The following assessments were completed by patient for this visit:  PHQ2:   PHQ-2 ( 1999 [employer]) 1/15/2023 11/16/2022 8/5/2022 7/6/2022 3/23/2022 1/18/2022 11/27/2020   Q1: Little interest or pleasure in doing things 1 1 3 - 1 1 1   Q2: Feeling down, depressed or hopeless 1 1 0 - 0 0 0   PHQ-2 Score 2 2 3 - 1 1 1   PHQ-2 Total Score (12-17 Years)- Positive if 3 or more points; Administer PHQ-A if positive - - - - - - 1   Q1: Little interest or pleasure in doing things Several days Several days Nearly every day - Several days Several days Several days   Q2: Feeling down, depressed or hopeless  Several days Several days Not at all - Not at all Not at all Not at all   PHQ-2 Score 2 2 3 Incomplete 1 1 1     PHQ9:   PHQ-9 SCORE 7/6/2022 8/5/2022 10/21/2022 11/16/2022 12/5/2022 12/12/2022 1/9/2023   PHQ-9 Total Score - - - - - - -   PHQ-9 Total Score MyChart 3 (Minimal depression) 6 (Mild depression) 14 (Moderate depression) 5 (Mild depression) 9 (Mild depression) 7 (Mild depression) 7 (Mild depression)   PHQ-9 Total Score 3 6 14 5 9 7 7     GAD2:   SPRING-2 12/5/2022 12/5/2022 1/3/2023 2/2/2023   Feeling nervous, anxious, or on edge 1 1 - 1   Not being able to stop or control worrying 1 1 1 1   SPRING-2 Total Score 2 2 - 2     GAD7:   SPRING-7 SCORE 2/10/2017 2/22/2018 6/5/2019 6/16/2020 7/6/2022 8/5/2022 11/16/2022   Total Score - - - - 2 (minimal anxiety) 5 (mild anxiety) 2 (minimal anxiety)   Total Score 1 0 0 7 2 5 2     CAGE-AID:   CAGE-AID Total Score 12/5/2022   Total Score 0   Total Score MyChart 0 (A total score of 2 or greater is considered clinically significant)     PROMIS 10-Global Health (only subscores and total score):   PROMIS-10 Scores Only 9/21/2017 11/15/2018 3/25/2022 12/5/2022 12/5/2022   Global Mental Health Score 13 12 12 8 8   Global Physical Health Score 14 12 12 13 13   PROMIS TOTAL - SUBSCORES 27 24 24 21 21     [place] Suicide Severity Rating Scale (Lifetime/Recent)  [place] Suicide Severity Rating (Lifetime/Recent) 12/5/2022   Q1 Wished to be Dead (Past Month) no   Q2 Suicidal Thoughts (Past Month) no   Q3 Suicidal Thought Method no   Q4 Suicidal Intent without Specific Plan no   Q5 Suicide Intent with Specific Plan no   Q6 Suicide Behavior (Lifetime) no   Level of Risk per Screen low risk         ASSESSMENT: Current Emotional / Mental Status (status of significant symptoms):   Risk status (Self / Other harm or suicidal ideation)   Patient denies current fears or concerns for personal safety.   Patient denies current or recent suicidal ideation or behaviors.   Patient denies current or  recent homicidal ideation or behaviors.   Patient denies current or recent self injurious behavior or ideation.   Patient denies other safety concerns.   Patient reports there has been no change in risk factors since their last session.     Patient reports there has been no change in protective factors since their last session.     Recommended that patient call 911 or go to the local ED should there be a change in any of these risk factors.     Appearance:   Appropriate    Eye Contact:   Good    Psychomotor Behavior: Normal    Attitude:   Pleasant Attentive   Orientation:   All   Speech    Rate / Production: Emotional Normal     Volume:  Normal    Mood:    Depressed    Affect:    Appropriate    Thought Content:  Clear    Thought Form:  Coherent  Logical  Circumstantial   Insight:    Good      Medication Review:   No changes to current psychiatric medication(s)     Medication Compliance:   Yes     Changes in Health Issues:   None reported     Chemical Use Review:   Substance Use: Chemical use reviewed, no active concerns identified      Tobacco Use: No change in amount of tobacco use since last session.  Patient declined discussion at this time    Diagnosis:  1. Moderate episode of recurrent major depressive disorder (H)        Collateral Reports Completed:   Not Applicable    PLAN: (Patient Tasks / Therapist Tasks / Other)   Embrace radical acceptance and boundaries.     Rocío Arenas, [facility] 2/8/23                                                         ______________________________________________________________________  Answers for HPI/ROS submitted by the patient on 1/9/2023  If you checked off any problems, how difficult have these problems made it for you to do your work, take care of things at home, or get along with other people?: Somewhat difficult  PHQ9 TOTAL SCORE: 7                                              Individual Treatment Plan    Patient's Name: Ginger Marshall  YOB: 1940    Date  of Creation: 2/8/23  Date Treatment Plan Last Reviewed/Revised:     DSM5 Diagnoses: 296.31 (F33.0) Major Depressive Disorder, Recurrent Episode, Mild With anxious distress  Psychosocial / Contextual Factors: aging, losses, generational trauma  PROMIS (reviewed every 90 days):   21 12/5/22    Referral / Collaboration:  Referral to another professional/service is not indicated at this time..    Anticipated number of session for this episode of care: 6-9 sessions  Anticipation frequency of session: Every other week  Anticipated Duration of each session: 38-52 minutes  Treatment plan will be reviewed in 90 days or when goals have been changed.       MeasurableTreatment Goal(s) related to diagnosis / functional impairment(s)  Goal 1: Patient will experience an improvement in mood and functioning as evidenced by assessment scores, self report and clinician observation    I will know I've met my goal when things feel better (paraphrase).      Objective #A (Patient Action)    Patient will increase understanding of steps in the grief process   Talk to others about losses  Use prayer practices and jena community to support well being.  Status: New - Date: 2/8/23     Intervention(s)  Therapist will teach and support grief processing skills, prayer practices.    Objective #B  Patient will Increase interest, engagement, and pleasure in doing things  Decrease frequency and intensity of feeling down, depressed, hopeless  Improve quantity and quality of night time sleep / decrease daytime naps  Feel less tired and more energy during the day   Improve diet, appetite, mindful eating, and / or meal planning  Identify negative self-talk and behaviors: challenge core beliefs, myths, and actions  Improve concentration, focus, and mindfulness in daily activities   Feel less fidgety, restless or slow in daily activities / interpersonal interactions.  Status: New - Date: 2/8/23     Intervention(s)  Therapist will teach cbt, dbt,MI,  mindfulness and behavioral activation and assign homework.      Patient has reviewed and agreed to the above plan.      Rocío Arenas, [facility]  February 8, 2023

## 2023-02-11 ENCOUNTER — APPOINTMENT (OUTPATIENT)
Dept: CT IMAGING | Facility: CLINIC | Age: 83
DRG: 511 | End: 2023-02-11
Attending: EMERGENCY MEDICINE
Payer: COMMERCIAL

## 2023-02-11 ENCOUNTER — ANESTHESIA EVENT (OUTPATIENT)
Dept: SURGERY | Facility: CLINIC | Age: 83
DRG: 511 | End: 2023-02-11
Payer: COMMERCIAL

## 2023-02-11 ENCOUNTER — APPOINTMENT (OUTPATIENT)
Dept: GENERAL RADIOLOGY | Facility: CLINIC | Age: 83
DRG: 511 | End: 2023-02-11
Attending: EMERGENCY MEDICINE
Payer: COMMERCIAL

## 2023-02-11 ENCOUNTER — HOSPITAL ENCOUNTER (INPATIENT)
Facility: CLINIC | Age: 83
LOS: 4 days | Discharge: NURSING, REHAB OR LONG-TERM CARE | DRG: 511 | End: 2023-02-15
Attending: EMERGENCY MEDICINE | Admitting: HOSPITALIST
Payer: COMMERCIAL

## 2023-02-11 DIAGNOSIS — E78.5 HYPERLIPIDEMIA LDL GOAL <100: Chronic | ICD-10-CM
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
## 2023-02-11 DIAGNOSIS — M05.741 RHEUMATOID ARTHRITIS INVOLVING RIGHT HAND WITH POSITIVE RHEUMATOID FACTOR (H): Primary | Chronic | ICD-10-CM

## 2023-02-11 DIAGNOSIS — S52.202A CLOSED FRACTURE OF SHAFT OF LEFT ULNA, UNSPECIFIED FRACTURE MORPHOLOGY, INITIAL ENCOUNTER: ICD-10-CM

## 2023-02-11 DIAGNOSIS — K21.9 GASTROESOPHAGEAL REFLUX DISEASE WITHOUT ESOPHAGITIS: ICD-10-CM

## 2023-02-11 DIAGNOSIS — M05.79 RHEUMATOID ARTHRITIS INVOLVING MULTIPLE SITES WITH POSITIVE RHEUMATOID FACTOR (H): ICD-10-CM

## 2023-02-11 DIAGNOSIS — K64.4 EXTERNAL HEMORRHOIDS: ICD-10-CM

## 2023-02-11 DIAGNOSIS — T78.40XD ALLERGY, SUBSEQUENT ENCOUNTER: ICD-10-CM

## 2023-02-11 DIAGNOSIS — F33.1 MODERATE EPISODE OF RECURRENT MAJOR DEPRESSIVE DISORDER (H): ICD-10-CM

## 2023-02-11 DIAGNOSIS — S52.572A OTHER CLOSED INTRA-ARTICULAR FRACTURE OF DISTAL END OF LEFT RADIUS, INITIAL ENCOUNTER: ICD-10-CM

## 2023-02-11 DIAGNOSIS — R06.02 SOB (SHORTNESS OF BREATH): ICD-10-CM

## 2023-02-11 DIAGNOSIS — S22.31XD CLOSED FRACTURE OF ONE RIB OF RIGHT SIDE WITH ROUTINE HEALING, SUBSEQUENT ENCOUNTER: ICD-10-CM

## 2023-02-11 DIAGNOSIS — R05.1 ACUTE COUGH: ICD-10-CM

## 2023-02-11 DIAGNOSIS — F41.9 ANXIETY: ICD-10-CM

## 2023-02-11 DIAGNOSIS — S52.021A CLOSED FRACTURE OF OLECRANON PROCESS OF RIGHT ULNA, INITIAL ENCOUNTER: ICD-10-CM

## 2023-02-11 DIAGNOSIS — R50.9 FEVER, UNSPECIFIED FEVER CAUSE: ICD-10-CM

## 2023-02-11 DIAGNOSIS — J34.89 NASAL SORE: ICD-10-CM

## 2023-02-11 LAB
ABO/RH(D): NORMAL
ANION GAP SERPL CALCULATED.3IONS-SCNC: 10 MMOL/L (ref 7–15)
ANTIBODY SCREEN: NEGATIVE
BASOPHILS # BLD AUTO: 0 10E3/UL (ref 0–0.2)
BASOPHILS NFR BLD AUTO: 0 %
BUN SERPL-MCNC: 12.1 MG/DL (ref 8–23)
CALCIUM SERPL-MCNC: 9.3 MG/DL (ref 8.8–10.2)
CHLORIDE SERPL-SCNC: 103 MMOL/L (ref 98–107)
CREAT SERPL-MCNC: 0.51 MG/DL (ref 0.51–0.95)
DEPRECATED HCO3 PLAS-SCNC: 25 MMOL/L (ref 22–29)
EOSINOPHIL # BLD AUTO: 0.2 10E3/UL (ref 0–0.7)
EOSINOPHIL NFR BLD AUTO: 3 %
ERYTHROCYTE [DISTWIDTH] IN BLOOD BY AUTOMATED COUNT: 15.8 % (ref 10–15)
GFR SERPL CREATININE-BSD FRML MDRD: >90 ML/MIN/1.73M2
GLUCOSE SERPL-MCNC: 112 MG/DL (ref 70–99)
HCT VFR BLD AUTO: 34.5 % (ref 35–47)
HGB BLD-MCNC: 10.5 G/DL (ref 11.7–15.7)
HOLD SPECIMEN: NORMAL
IMM GRANULOCYTES # BLD: 0 10E3/UL
IMM GRANULOCYTES NFR BLD: 1 %
LYMPHOCYTES # BLD AUTO: 1 10E3/UL (ref 0.8–5.3)
LYMPHOCYTES NFR BLD AUTO: 17 %
MCH RBC QN AUTO: 28.8 PG (ref 26.5–33)
MCHC RBC AUTO-ENTMCNC: 30.4 G/DL (ref 31.5–36.5)
MCV RBC AUTO: 95 FL (ref 78–100)
MONOCYTES # BLD AUTO: 0.6 10E3/UL (ref 0–1.3)
MONOCYTES NFR BLD AUTO: 10 %
NEUTROPHILS # BLD AUTO: 4 10E3/UL (ref 1.6–8.3)
NEUTROPHILS NFR BLD AUTO: 69 %
NRBC # BLD AUTO: 0 10E3/UL
NRBC BLD AUTO-RTO: 0 /100
PLATELET # BLD AUTO: 406 10E3/UL (ref 150–450)
POTASSIUM SERPL-SCNC: 3.9 MMOL/L (ref 3.4–5.3)
RBC # BLD AUTO: 3.64 10E6/UL (ref 3.8–5.2)
SODIUM SERPL-SCNC: 138 MMOL/L (ref 136–145)
SPECIMEN EXPIRATION DATE: NORMAL
WBC # BLD AUTO: 5.7 10E3/UL (ref 4–11)

## 2023-02-11 PROCEDURE — 73090 X-RAY EXAM OF FOREARM: CPT | Mod: LT

## 2023-02-11 PROCEDURE — 96374 THER/PROPH/DIAG INJ IV PUSH: CPT

## 2023-02-11 PROCEDURE — 86901 BLOOD TYPING SEROLOGIC RH(D): CPT | Performed by: EMERGENCY MEDICINE

## 2023-02-11 PROCEDURE — 86850 RBC ANTIBODY SCREEN: CPT | Performed by: EMERGENCY MEDICINE

## 2023-02-11 PROCEDURE — 96375 TX/PRO/DX INJ NEW DRUG ADDON: CPT

## 2023-02-11 PROCEDURE — 29125 APPL SHORT ARM SPLINT STATIC: CPT | Mod: LT

## 2023-02-11 PROCEDURE — 73200 CT UPPER EXTREMITY W/O DYE: CPT | Mod: RT

## 2023-02-11 PROCEDURE — 250N000011 HC RX IP 250 OP 636

## 2023-02-11 PROCEDURE — 250N000011 HC RX IP 250 OP 636: Performed by: EMERGENCY MEDICINE

## 2023-02-11 PROCEDURE — G0378 HOSPITAL OBSERVATION PER HR: HCPCS

## 2023-02-11 PROCEDURE — 99232 SBSQ HOSP IP/OBS MODERATE 35: CPT

## 2023-02-11 PROCEDURE — 72125 CT NECK SPINE W/O DYE: CPT

## 2023-02-11 PROCEDURE — 120N000001 HC R&B MED SURG/OB

## 2023-02-11 PROCEDURE — 250N000013 HC RX MED GY IP 250 OP 250 PS 637: Performed by: HOSPITALIST

## 2023-02-11 PROCEDURE — 258N000003 HC RX IP 258 OP 636

## 2023-02-11 PROCEDURE — 250N000013 HC RX MED GY IP 250 OP 250 PS 637

## 2023-02-11 PROCEDURE — 73080 X-RAY EXAM OF ELBOW: CPT | Mod: RT

## 2023-02-11 PROCEDURE — 36415 COLL VENOUS BLD VENIPUNCTURE: CPT | Performed by: EMERGENCY MEDICINE

## 2023-02-11 PROCEDURE — 96376 TX/PRO/DX INJ SAME DRUG ADON: CPT

## 2023-02-11 PROCEDURE — 93010 ELECTROCARDIOGRAM REPORT: CPT | Performed by: INTERNAL MEDICINE

## 2023-02-11 PROCEDURE — 29105 APPLICATION LONG ARM SPLINT: CPT | Mod: RT

## 2023-02-11 PROCEDURE — 70486 CT MAXILLOFACIAL W/O DYE: CPT

## 2023-02-11 PROCEDURE — 3E00XNZ INTRODUCTION OF ANALGESICS, HYPNOTICS, SEDATIVES INTO SKIN AND MUCOUS MEMBRANES, EXTERNAL APPROACH: ICD-10-PCS | Performed by: EMERGENCY MEDICINE

## 2023-02-11 PROCEDURE — 73200 CT UPPER EXTREMITY W/O DYE: CPT | Mod: LT,XS

## 2023-02-11 PROCEDURE — 85025 COMPLETE CBC W/AUTO DIFF WBC: CPT | Performed by: EMERGENCY MEDICINE

## 2023-02-11 PROCEDURE — 999N000065 XR WRIST PORT LEFT 2 VIEWS: Mod: LT

## 2023-02-11 PROCEDURE — 82310 ASSAY OF CALCIUM: CPT | Performed by: EMERGENCY MEDICINE

## 2023-02-11 PROCEDURE — 73100 X-RAY EXAM OF WRIST: CPT | Mod: LT

## 2023-02-11 PROCEDURE — 99285 EMERGENCY DEPT VISIT HI MDM: CPT | Mod: 25

## 2023-02-11 PROCEDURE — 250N000013 HC RX MED GY IP 250 OP 250 PS 637: Performed by: EMERGENCY MEDICINE

## 2023-02-11 PROCEDURE — 70450 CT HEAD/BRAIN W/O DYE: CPT

## 2023-02-11 RX ORDER — GUAIFENESIN 600 MG/1
600 TABLET, EXTENDED RELEASE ORAL 2 TIMES DAILY
Status: DISCONTINUED | OUTPATIENT
Start: 2023-02-11 | End: 2023-02-15 | Stop reason: HOSPADM

## 2023-02-11 RX ORDER — HYDROMORPHONE HYDROCHLORIDE 1 MG/ML
0.3 INJECTION, SOLUTION INTRAMUSCULAR; INTRAVENOUS; SUBCUTANEOUS ONCE
Status: COMPLETED | OUTPATIENT
Start: 2023-02-11 | End: 2023-02-11

## 2023-02-11 RX ORDER — NALOXONE HYDROCHLORIDE 0.4 MG/ML
0.4 INJECTION, SOLUTION INTRAMUSCULAR; INTRAVENOUS; SUBCUTANEOUS
Status: DISCONTINUED | OUTPATIENT
Start: 2023-02-11 | End: 2023-02-15 | Stop reason: HOSPADM

## 2023-02-11 RX ORDER — GABAPENTIN 300 MG/1
600 CAPSULE ORAL 2 TIMES DAILY
Status: DISCONTINUED | OUTPATIENT
Start: 2023-02-11 | End: 2023-02-15 | Stop reason: HOSPADM

## 2023-02-11 RX ORDER — AMOXICILLIN 250 MG
2 CAPSULE ORAL 2 TIMES DAILY
Status: DISCONTINUED | OUTPATIENT
Start: 2023-02-11 | End: 2023-02-15 | Stop reason: HOSPADM

## 2023-02-11 RX ORDER — NALOXONE HYDROCHLORIDE 0.4 MG/ML
0.2 INJECTION, SOLUTION INTRAMUSCULAR; INTRAVENOUS; SUBCUTANEOUS
Status: DISCONTINUED | OUTPATIENT
Start: 2023-02-11 | End: 2023-02-15 | Stop reason: HOSPADM

## 2023-02-11 RX ORDER — LORATADINE 10 MG/1
10 TABLET ORAL 2 TIMES DAILY
Status: DISCONTINUED | OUTPATIENT
Start: 2023-02-11 | End: 2023-02-15 | Stop reason: HOSPADM

## 2023-02-11 RX ORDER — DIPHENHYDRAMINE HCL 25 MG
25 CAPSULE ORAL EVERY 6 HOURS PRN
Status: DISCONTINUED | OUTPATIENT
Start: 2023-02-11 | End: 2023-02-11

## 2023-02-11 RX ORDER — HYDROXYZINE HYDROCHLORIDE 50 MG/1
50 TABLET, FILM COATED ORAL EVERY 6 HOURS PRN
Status: DISCONTINUED | OUTPATIENT
Start: 2023-02-11 | End: 2023-02-15 | Stop reason: HOSPADM

## 2023-02-11 RX ORDER — ALBUTEROL SULFATE 90 UG/1
2 AEROSOL, METERED RESPIRATORY (INHALATION) EVERY 6 HOURS PRN
Status: DISCONTINUED | OUTPATIENT
Start: 2023-02-11 | End: 2023-02-15 | Stop reason: HOSPADM

## 2023-02-11 RX ORDER — BUSPIRONE HYDROCHLORIDE 10 MG/1
10 TABLET ORAL 2 TIMES DAILY
Status: DISCONTINUED | OUTPATIENT
Start: 2023-02-11 | End: 2023-02-15 | Stop reason: HOSPADM

## 2023-02-11 RX ORDER — GLIPIZIDE 10 MG/1
1 TABLET ORAL 2 TIMES DAILY PRN
Status: DISCONTINUED | OUTPATIENT
Start: 2023-02-11 | End: 2023-02-13

## 2023-02-11 RX ORDER — DIPHENHYDRAMINE HYDROCHLORIDE 50 MG/ML
25 INJECTION INTRAMUSCULAR; INTRAVENOUS EVERY 6 HOURS PRN
Status: DISCONTINUED | OUTPATIENT
Start: 2023-02-11 | End: 2023-02-11

## 2023-02-11 RX ORDER — SODIUM CHLORIDE, SODIUM LACTATE, POTASSIUM CHLORIDE, CALCIUM CHLORIDE 600; 310; 30; 20 MG/100ML; MG/100ML; MG/100ML; MG/100ML
INJECTION, SOLUTION INTRAVENOUS CONTINUOUS
Status: DISCONTINUED | OUTPATIENT
Start: 2023-02-12 | End: 2023-02-13

## 2023-02-11 RX ORDER — OXYCODONE HYDROCHLORIDE 5 MG/1
5 TABLET ORAL ONCE
Status: COMPLETED | OUTPATIENT
Start: 2023-02-11 | End: 2023-02-11

## 2023-02-11 RX ORDER — PROCHLORPERAZINE 25 MG
12.5 SUPPOSITORY, RECTAL RECTAL EVERY 12 HOURS PRN
Status: DISCONTINUED | OUTPATIENT
Start: 2023-02-11 | End: 2023-02-15 | Stop reason: HOSPADM

## 2023-02-11 RX ORDER — ACETAMINOPHEN 325 MG/1
975 TABLET ORAL EVERY 8 HOURS
Status: DISCONTINUED | OUTPATIENT
Start: 2023-02-11 | End: 2023-02-15 | Stop reason: HOSPADM

## 2023-02-11 RX ORDER — PROCHLORPERAZINE MALEATE 5 MG
5 TABLET ORAL EVERY 6 HOURS PRN
Status: DISCONTINUED | OUTPATIENT
Start: 2023-02-11 | End: 2023-02-15 | Stop reason: HOSPADM

## 2023-02-11 RX ORDER — HYDROXYZINE HYDROCHLORIDE 25 MG/1
25 TABLET, FILM COATED ORAL EVERY 6 HOURS PRN
Status: DISCONTINUED | OUTPATIENT
Start: 2023-02-11 | End: 2023-02-15 | Stop reason: HOSPADM

## 2023-02-11 RX ORDER — FAMOTIDINE 20 MG/1
20 TABLET, FILM COATED ORAL 2 TIMES DAILY
Status: DISCONTINUED | OUTPATIENT
Start: 2023-02-11 | End: 2023-02-15 | Stop reason: HOSPADM

## 2023-02-11 RX ORDER — AMOXICILLIN 250 MG
1 CAPSULE ORAL 2 TIMES DAILY
Status: DISCONTINUED | OUTPATIENT
Start: 2023-02-11 | End: 2023-02-15 | Stop reason: HOSPADM

## 2023-02-11 RX ORDER — HYDROMORPHONE HCL IN WATER/PF 6 MG/30 ML
0.2 PATIENT CONTROLLED ANALGESIA SYRINGE INTRAVENOUS
Status: DISCONTINUED | OUTPATIENT
Start: 2023-02-11 | End: 2023-02-14

## 2023-02-11 RX ORDER — ONDANSETRON 4 MG/1
4 TABLET, ORALLY DISINTEGRATING ORAL EVERY 6 HOURS PRN
Status: DISCONTINUED | OUTPATIENT
Start: 2023-02-11 | End: 2023-02-15 | Stop reason: HOSPADM

## 2023-02-11 RX ORDER — LIDOCAINE 40 MG/G
CREAM TOPICAL
Status: DISCONTINUED | OUTPATIENT
Start: 2023-02-11 | End: 2023-02-15 | Stop reason: HOSPADM

## 2023-02-11 RX ORDER — GABAPENTIN 300 MG/1
600 CAPSULE ORAL 2 TIMES DAILY
Status: DISCONTINUED | OUTPATIENT
Start: 2023-02-11 | End: 2023-02-11

## 2023-02-11 RX ORDER — ONDANSETRON 2 MG/ML
4 INJECTION INTRAMUSCULAR; INTRAVENOUS ONCE
Status: COMPLETED | OUTPATIENT
Start: 2023-02-11 | End: 2023-02-11

## 2023-02-11 RX ORDER — ONDANSETRON 2 MG/ML
4 INJECTION INTRAMUSCULAR; INTRAVENOUS EVERY 6 HOURS PRN
Status: DISCONTINUED | OUTPATIENT
Start: 2023-02-11 | End: 2023-02-15 | Stop reason: HOSPADM

## 2023-02-11 RX ORDER — DESVENLAFAXINE 100 MG/1
100 TABLET, EXTENDED RELEASE ORAL DAILY
Status: DISCONTINUED | OUTPATIENT
Start: 2023-02-12 | End: 2023-02-15 | Stop reason: HOSPADM

## 2023-02-11 RX ORDER — ATORVASTATIN CALCIUM 40 MG/1
40 TABLET, FILM COATED ORAL DAILY
Status: DISCONTINUED | OUTPATIENT
Start: 2023-02-12 | End: 2023-02-15 | Stop reason: HOSPADM

## 2023-02-11 RX ADMIN — ACETAMINOPHEN 975 MG: 325 TABLET ORAL at 22:04

## 2023-02-11 RX ADMIN — OXYCODONE HYDROCHLORIDE 5 MG: 5 TABLET ORAL at 22:04

## 2023-02-11 RX ADMIN — ACETAMINOPHEN 975 MG: 325 TABLET ORAL at 16:43

## 2023-02-11 RX ADMIN — FAMOTIDINE 20 MG: 20 TABLET, FILM COATED ORAL at 20:06

## 2023-02-11 RX ADMIN — GABAPENTIN 600 MG: 300 CAPSULE ORAL at 22:04

## 2023-02-11 RX ADMIN — GUAIFENESIN 600 MG: 600 TABLET, EXTENDED RELEASE ORAL at 20:06

## 2023-02-11 RX ADMIN — GABAPENTIN 600 MG: 300 CAPSULE ORAL at 16:43

## 2023-02-11 RX ADMIN — ONDANSETRON 4 MG: 2 INJECTION INTRAMUSCULAR; INTRAVENOUS at 10:37

## 2023-02-11 RX ADMIN — SODIUM CHLORIDE, POTASSIUM CHLORIDE, SODIUM LACTATE AND CALCIUM CHLORIDE: 600; 310; 30; 20 INJECTION, SOLUTION INTRAVENOUS at 15:10

## 2023-02-11 RX ADMIN — OXYCODONE HYDROCHLORIDE 5 MG: 5 TABLET ORAL at 10:32

## 2023-02-11 RX ADMIN — HYDROMORPHONE HYDROCHLORIDE 0.3 MG: 1 INJECTION, SOLUTION INTRAMUSCULAR; INTRAVENOUS; SUBCUTANEOUS at 08:00

## 2023-02-11 RX ADMIN — HYDROMORPHONE HYDROCHLORIDE 0.3 MG: 1 INJECTION, SOLUTION INTRAMUSCULAR; INTRAVENOUS; SUBCUTANEOUS at 10:43

## 2023-02-11 RX ADMIN — SODIUM CHLORIDE, POTASSIUM CHLORIDE, SODIUM LACTATE AND CALCIUM CHLORIDE: 600; 310; 30; 20 INJECTION, SOLUTION INTRAVENOUS at 23:11

## 2023-02-11 RX ADMIN — SENNOSIDES AND DOCUSATE SODIUM 1 TABLET: 50; 8.6 TABLET ORAL at 20:06

## 2023-02-11 RX ADMIN — OXYCODONE HYDROCHLORIDE 5 MG: 5 TABLET ORAL at 16:43

## 2023-02-11 RX ADMIN — HYDROXYZINE HYDROCHLORIDE 25 MG: 25 TABLET ORAL at 20:12

## 2023-02-11 RX ADMIN — LORATADINE 10 MG: 10 TABLET ORAL at 20:06

## 2023-02-11 RX ADMIN — HYDROMORPHONE HYDROCHLORIDE 0.2 MG: 0.2 INJECTION, SOLUTION INTRAMUSCULAR; INTRAVENOUS; SUBCUTANEOUS at 15:10

## 2023-02-11 RX ADMIN — BUSPIRONE HYDROCHLORIDE 10 MG: 10 TABLET ORAL at 20:06

## 2023-02-11 ASSESSMENT — ACTIVITIES OF DAILY LIVING (ADL)
ADLS_ACUITY_SCORE: 35
ADLS_ACUITY_SCORE: 22
ADLS_ACUITY_SCORE: 35
ADLS_ACUITY_SCORE: 25
ADLS_ACUITY_SCORE: 35
ADLS_ACUITY_SCORE: 37
ADLS_ACUITY_SCORE: 22
ADLS_ACUITY_SCORE: 22

## 2023-02-11 ASSESSMENT — ENCOUNTER SYMPTOMS
HEADACHES: 0
ARTHRALGIAS: 1
SHORTNESS OF BREATH: 0
NECK PAIN: 0
BACK PAIN: 0

## 2023-02-11 NOTE — ED NOTES
Bed: ED14  Expected date: 2/11/23  Expected time:   Means of arrival: Ambulance  Comments:  [facility]

## 2023-02-11 NOTE — PLAN OF CARE
ROOM # 202-2    Living Situation (if not independent, order SW consult):  Facility name:  : Jamila Parson (daughter)    Activity level at baseline: Independent  Activity level on admit: A x 1 w/ gait belt    Who will be transporting you at discharge:     Patient registered to observation; given Patient Bill of Rights; given the opportunity to ask questions about observation status and their plan of care.  Patient has been oriented to the observation room, bathroom and call light is in place.    Discussed discharge goals and expectations with patient/family.       Patient Transfer Information  Patient connected to monitoring equipment on arrival: N/A     Patient connected to wall oxygen on arrival: N/A    Belongings: Transferred with patient    Safety check completed: Yes

## 2023-02-11 NOTE — ED PROVIDER NOTES
History     Chief Complaint:  Fall       HPI   Ginger Marshall is a 82 year old female with a history of osteoporosis who presents via EMS with left wrist pain, right elbow pain and facial pain after a mechanical fall. She was walking her dog this morning outside when she fell. EMS reports that she fell on her face hitting her nose and chipping her tooth. Denies loss of consciousness, use of blood thinners, headache or neck pain. EMS reports a deformity to her left wrist. She also notes of right elbow pain. EMS gave 50 mcg fentanyl. Denies headache, neck pain, back pain, chest pain or shortness of breath. Last Tdap 12/12/18.  No history anticoagulation.     Independent Historian:   EMS - They provided history, see details above.    Review of External Notes: 2/6/23 virtual visit rheumatology    ROS:  Review of Systems   Respiratory: Negative for shortness of breath.    Cardiovascular: Negative for chest pain.   Musculoskeletal: Positive for arthralgias. Negative for back pain and neck pain.   Neurological: Negative for headaches.   All other systems reviewed and are negative.      Allergies:  Abatacept  Celebrex [Celecoxib]  Celecoxib  Ethanol  Levaquin [Levofloxacin]  Orencia [Abatacept]  Septra [Bactrim]  Sulfa Drugs  Sulfamethoxazole-Trimethoprim  Tramadol  Valdecoxib  Adhesive Tape  Antihistamines, Chlorpheniramine-Type  [Alkylamines]     Medications:    Albuterol  Lipitor  Buspar  Vitamin D3  Pristiq  Neurontin  Mucinex  Wellcovorin  Claritin  Rasuvo  Anaprox  Protonix  Carafate  Rinvoq  Valtrex  Reclast    Past Medical History:    Iron deficiency anemia  COPD  Ex-smoker  Gastroenteritis  Herniated nucleus pulposus, L3-4  Migraine  Rheumatoid arthritis  Rheumatic mitral stenosis  Multinodular goiter  Vitamin B12 deficiency  GERD  Colonic polyps   Hyperlipidemia  Neuropathy  SHERRY  Osteoporosis  Pulmonary nodule  Spinal stenosis, lumbar  Allergic state  Depression  MGD  PUD  PVD    Past Surgical History:      x2  Arthroscopy knee, left  Back surgery, disc  Biopsy breast  Lumpectomy  Cataract extraction IOL, bilateral  Colonoscopy x2  Distal ulna resection, right  Foot surgery, left  Hand surgery, right  Lumbar epidural steroid injection  Lumbar fusion  Right total knee replacement  Left total knee arthroplasty    Family History:    Mother - arthritis, alzheimer disease, hyperlipidemia, osteoporosis  Father - MI, alcohol/drug  Sister - arthritis, hypertension, leukemia, schizophrenic, hyperlipidemia  Son - cancer, type 1 diabetes, substance abuse  Daughter - glaucoma    Social History:  The patient presents to the ED via EMS alone.  PCP: Duy Leyva     Physical Exam     Patient Vitals for the past 24 hrs:   BP Temp Temp src Pulse Resp SpO2   23 0803 137/70 97.3  F (36.3  C) Oral 63 20 98 %        Physical Exam  General: Alert. Appears uncomfortable  Head:  The scalp is without trauma  Eyes:  Sclera white; Pupils are equal and round  ENT:    External ears normal.  No hemotympanum.      Abrasion and soft tissue swelling to nose.  No septal hematoma or epistaxis.    Neck:  No midline tenderness or pain with full ROM.  CV:  Rate as above with regular rhythm   2/2 radial and dorsal pedal pulses  Resp:  Breath sounds clear and equal bilaterally    Non-labored, no retractions or accessory muscle use  GI:  Abdomen soft, non-tender, non-distended    No rebound tenderness or guarding  MSK:  No midline tenderness or bony step-off    L. Wrist with obvious deformity; decreased ROM 2/2 to pain. Able to range all fingers without difficulty. No L. Elbow/shoulder pain. R. Elbow with contusion and decreased ROM 2/2 to pain. No R. Shoulder/wrist tenderness.     Moves all extremities equally and symmetrically  Skin:  No rash or lesions noted.  Neuro:   No apparent deficit.    Sensation intact x4.     GCS: 15  Psych:  Normal affect.        Emergency Department Course     Imaging:  Radius/Ulna XR,  PA &LAT, left   Final  Result   IMPRESSION: Complex, comminuted and impacted intra-articular distal radius fracture with displacement and dorsal angulation. Minimally displaced oblique fracture of the distal ulnar shaft.       The more proximal radius and ulna of the forearm are intact. Bones are demineralized. There is mild degenerative change about the elbow with some cortical irregularity of the radial head which is favored to be degenerative, however there is concern for    an acute left elbow fracture, recommend dedicated left elbow radiographs.      NOTE: ABNORMAL REPORT      THE DICTATION ABOVE DESCRIBES AN ABNORMALITY FOR WHICH FOLLOW-UP IS NEEDED.           Elbow XR, G/E 3 views, right   Final Result   IMPRESSION: Complex, comminuted fracture of the olecranon and proximal ulna where there is intra-articular involvement and fracture fragment distraction up to 2.3 cm. Soft tissue swelling and a complex right elbow joint effusion.             NOTE: ABNORMAL REPORT      THE DICTATION ABOVE DESCRIBES AN ABNORMALITY FOR WHICH FOLLOW-UP IS NEEDED.       XR Wrist Left 2 Views   Final Result   IMPRESSION: Complex, comminuted and impacted intra-articular distal radius fracture with displacement and dorsal angulation. Minimally displaced oblique fracture of the distal ulnar shaft.       The more proximal radius and ulna of the forearm are intact. Bones are demineralized. There is mild degenerative change about the elbow with some cortical irregularity of the radial head which is favored to be degenerative, however there is concern for    an acute left elbow fracture, recommend dedicated left elbow radiographs.      NOTE: ABNORMAL REPORT      THE DICTATION ABOVE DESCRIBES AN ABNORMALITY FOR WHICH FOLLOW-UP IS NEEDED.           Head CT w/o contrast   Preliminary Result   IMPRESSION:   1.  No CT evidence for acute intracranial process.   2.  Brain atrophy and presumed chronic microvascular ischemic changes as above.         Cervical spine CT  w/o contrast   Preliminary Result   IMPRESSION:   1.  No acute fracture.   2.  Multilevel spondylolisthesis, as described.   3.  Degenerative changes, as described. No high-grade spinal canal or neural foraminal stenosis.   4.  Left thyroid nodule measuring over 20 mm. Follow-up thyroid ultrasound is suggested.         CT Facial Bones without Contrast   Preliminary Result   IMPRESSION:   1.  No acute displaced maxillofacial fracture identified.   2.  Possible soft tissue swelling overlying the nasal arch. Possible soft tissue swelling along the anterior aspect of the nasal septum. Please correlate clinically.         XR Wrist Port Left 2 Views    (Results Pending)   CT Wrist Left w/o Contrast    (Results Pending)   CT Elbow Right w/o Contrast    (Results Pending)      Report per radiology    Laboratory:  Labs Ordered and Resulted from Time of ED Arrival to Time of ED Departure   BASIC METABOLIC PANEL - Abnormal       Result Value    Sodium 138      Potassium 3.9      Chloride 103      Carbon Dioxide (CO2) 25      Anion Gap 10      Urea Nitrogen 12.1      Creatinine 0.51      Calcium 9.3      Glucose 112 (*)     GFR Estimate >90     CBC WITH PLATELETS AND DIFFERENTIAL - Abnormal    WBC Count 5.7      RBC Count 3.64 (*)     Hemoglobin 10.5 (*)     Hematocrit 34.5 (*)     MCV 95      MCH 28.8      MCHC 30.4 (*)     RDW 15.8 (*)     Platelet Count 406      % Neutrophils 69      % Lymphocytes 17      % Monocytes 10      % Eosinophils 3      % Basophils 0      % Immature Granulocytes 1      NRBCs per 100 WBC 0      Absolute Neutrophils 4.0      Absolute Lymphocytes 1.0      Absolute Monocytes 0.6      Absolute Eosinophils 0.2      Absolute Basophils 0.0      Absolute Immature Granulocytes 0.0      Absolute NRBCs 0.0     TYPE AND SCREEN, ADULT    ABO/RH(D) O POS      Antibody Screen Negative      SPECIMEN EXPIRATION DATE [id number]     ABO/RH TYPE AND SCREEN        Procedures     Procedure note: Hematoma  Block    Procedures Performed by: Dr. Rodriguez    Indication:  Anesthesia for fracture management  Location: left wrist    The patient's wrist, hand, and forearm were prepped with betadiene.  A sterile field was created.  Bupivicaine was instilled at the fracture site after aspirating blood.  Total of 5mL placed.      Patient tolerated the procedure without complications            Splint Placement     Procedure: Splint Placement     Indication: Fracture    Consent: Verbal     Location: Left Wrist    Preparation: Wounds were cleansed and dressed with a non-adherent bandage     Procedure detail:   Splint was applied by Myself  Splint type: Sugar-tong   Splint materilal: Fiberglass  After placement I checked and adjusted the fit as needed to ensure proper positioning/fit   Sensation and circulation are intact after splint placement     Patient Status: The patient tolerated the procedure well: Yes. There were no complications.      Splint Placement     Procedure: Splint Placement     Indication: Fracture    Consent: Verbal     Location: Right Arm    Preparation: Wounds were cleansed and dressed with a non-adherent bandage     Procedure detail:   Splint was applied by Myself  Splint type: Long-arm posterior   Splint materilal: Fiberglass  After placement I checked and adjusted the fit as needed to ensure proper positioning/fit   Sensation and circulation are intact after splint placement     Patient Status: The patient tolerated the procedure well: Yes. There were no complications.      Emergency Department Course & Assessments:    Interventions:  Medications   HYDROmorphone (PF) (DILAUDID) injection 0.3 mg (0.3 mg Intravenous Given 2/11/23 0800)   ondansetron (ZOFRAN) injection 4 mg (4 mg Intravenous Given 2/11/23 1037)   HYDROmorphone (PF) (DILAUDID) injection 0.3 mg (0.3 mg Intravenous Given 2/11/23 1043)   oxyCODONE (ROXICODONE) tablet 5 mg (5 mg Oral Given 2/11/23 1032)        Consultations/Discussion of Management  or Tests:  1028 I spoke with PRANAY Dupree with hospitalist service accepting for Dr. Vargas.  1134 I spoke with Dr. Platt, orthopedics, regarding the patient.  1143 I spoke with Dr. Brock, orthopedics, regarding the patient.       Social Determinants of Health affecting care:   None    Assessments:  0745 I obtained history and examined the patient as noted above.  0757 I performed hematoma block.  1010 I rechecked the patient and explained findings.  1043 I rechecked the patient and performed splint placement.    Disposition:  The patient was admitted to the hospital under the care of Dr. Vargas.     Impression & Plan      Medical Decision Making:    Patient is an 82-year-old female presenting status post a reported mechanical fall with predominately left wrist, right elbow as well as facial pain.  She is with obvious deformity to her left wrist and elbow.  X-rays do confirm comminuted fractures with intra-articular involvement.  She is neurovascularly intact.  We did discuss procedural reduction though patient is wishing to defer at this point time.  Hematoma block was performed and gentle traction provided though patient's wrist joint was incredibly unstable.  She was placed in Ortho-Glass splints to her left wrist as well as right elbow and remained neurovascularly intact.  CT head/C-spine and face without significant traumatic injury.  The remainder of her trauma exam is unremarkable.  Her pain was controlled during her time in the ED.  I did speak to orthopedic surgery who requests formal CT left wrist as well as right elbow at this point in time for surgical planning.  They state that surgery will likely be tomorrow.  She remained hemodynamically stable, accepted by hospitalist for admission.    Diagnosis:    ICD-10-CM    1. Other closed intra-articular fracture of distal end of left radius, initial encounter  S52.572A       2. Closed fracture of shaft of left ulna, unspecified fracture morphology,  initial encounter  S52.202A       3. Closed fracture of olecranon process of right ulna, initial encounter  S52.021A       4. Fall, initial encounter  W19.XXXA              Scribe Disclosure:  I, Dahlia Bhat, am serving as a scribe at 7:59 AM on 2/11/2023 to document services personally performed by Belen Rodriguez DO based on my observations and the provider's statements to me.     2/11/2023   Belen Rodriguez DO McDonald, Lindsey E, DO  02/11/23 1223

## 2023-02-11 NOTE — PROGRESS NOTES
Pt seen at bedside to discuss IV access after the surgery. Ortho asked about the possibility of an internal jugular central line. In light of the vasculature in the patients lower extremities, this would not be advised as the risk greatly outweighs the benefit. Plan to use the R upper extremity IV to get pt off to sleep and then a new IV will be secured.    After a discussion of the R/Bs, the patient says she wants to try to avoid a central line placement if possible.    Kuldip Monteiro MD  Chief Anesthesiologist

## 2023-02-11 NOTE — H&P
[facility]    History and Physical - Hospitalist Service       Date of Admission:  2/11/2023    Assessment & Plan      Ginger Marshall is a 82 year old female admitted on 2/11 with PMH history of right rib fracture, depression, anxiety, GERD, rheumatoid arthritis, osteoporosis who presents after mechanical fall this morning while walking her dog resulting in complex right elbow fracture and complex left distal radial and ulnar fractures.  Has a abrasion and bruising to the nasal bridge.  Chipped her front incisor however it is a bridge.  Denies any surgical complications or issues with anesthesia in the past.  Denies any head pain, chest pain, shortness of breath, abdominal pain, nausea/vomiting.  Denies any pain in the lower extremities. CT facial bones shows no acute displaced maxillofacial fracture, possible soft tissue swelling overlying the nasal arch and anterior nasal septum.      Complex right elbow fracture due to fall  Complex left distal radial and ulnar fracture   - consult ortho  -Tentative plan for surgery tomorrow  - Pain regimen: scheduled tylenol, ice, elevate, oxycodone PRN, IV Dilaudid PRN, will resume pta gabapentin dose  - CMS q4 hours checks on splinted arms  - NPO at midnight   -Consult PT and social work help with discharge planning  -Suspect will likely need TCU    Rheumatoid arthritis  -PTA methotrexate once weekly on Thursday and updacitinib daily    Osteoporosis   - Zoledronic acid next dose due March 2023    Hx Closed right rib fx   - noted on XR on 1/8/23, healing well    Left thyroid nodule  - follow up with outpatient thyroid ultrasound       Diet: NPO for Medical/Clinical Reasons Except for: Meds    DVT Prophylaxis: Pneumatic Compression Devices  Nichols Catheter: Not present  Lines: None     Cardiac Monitoring: None  Code Status:   DNR/DNI    Clinically Significant Risk Factors Present on Admission                       # Obesity: Estimated body mass index is  "33.92 kg/m  as calculated from the following:    Height as of 1/16/23: 1.657 m (5' 5.25\").    Weight as of 1/16/23: 93.2 kg (205 lb 6.4 oz).           Disposition Plan  likely 2-3 days pending surgery tomorrow and will likely need TCU. Currently lives independently and has bilateral arm fractures.      Expected Discharge Date: 02/12/2023                The patient's care was discussed with the Patient and Patient's Family.    IRAIDA Maciel PA-C  Hospitalist Service  [facility]  Securely message with [employer] (more info)  Text page via [facility] Paging/Directory     ______________________________________________________________________    Chief Complaint   Bilateral arm pain     History is obtained from the patient    History of Present Illness   Ginger Marshall is a 82 year old female with PMH history of right rib fracture, depression, anxiety, GERD, rheumatoid arthritis, osteoporosis who presents after mechanical fall this morning while walking her dog resulting in complex right elbow fracture and complex left distal radial and ulnar fractures.  Has a abrasion and bruising to the nasal bridge.  Chipped her front incisor however it is a bridge.  Denies any surgical complications or issues with anesthesia in the past.  Denies any head pain, chest pain, shortness of breath, abdominal pain, nausea/vomiting.  Denies any pain in the lower extremities.    In the ED, afebrile, blood pressure 137/70, heart rate 63, oxygen 98% on room air.  CT head negative for any acute intracranial process.  CT cervical spine negative for acute fracture.  Notes and incidental left thyroid nodule with recommendations for thyroid ultrasound.  CT facial bones shows no acute displaced maxillofacial fracture, possible soft tissue swelling overlying the nasal arch and anterior nasal septum.  Complex comminuted and impacted intra-articular distal radius fracture with displacement and angulation and minimally displaced oblique " fracture of the distal ulnar shaft. Right elbow shows complex comminuted fracture of the olecranon and proximal ulna with intra-articular involvement and a fracture fragment. BMP unremarkable. CBC notable for hemoglobin of 10.5.      Past Medical History    Past Medical History:   Diagnosis Date     Acute posthemorrhagic anemia 10/13/2012     Closed fracture of multiple ribs of left side, initial encounter 2019     COPD (chronic obstructive pulmonary disease) (H)     no longer occurring     Ex-smoker 1999     Gastroenteritis 2020    Gastroenteritis with norovirus     Herniated nucleus pulposus, L3-4 3/1/2017     Hip joint replacement by other means 07/10/2008     History of blood transfusion      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     History of total knee replacement 2009     Menopause 1989    late 40's     Migraine 2014    resolved     Multinodular goiter      Other chronic pain     joints     Pelvic fracture (H) 2014     Rheumatic mitral stenosis      Rheumatoid arteritis (H)      S/P lumbar fusion 2017     Sleep apnea      Vitamin B12 deficiency        Past Surgical History   Past Surgical History:   Procedure Laterality Date     ABDOMEN SURGERY      c sections     ARTHROSCOPY KNEE Left      ARTHROSCOPY KNEE RT/LT  2006    left     BACK SURGERY  2013    disc     BIOPSY BREAST       BREAST SURGERY      lumpectomy 's     BREAST SURGERY      lumpectomy     CATARACT EXTRACTION Bilateral      CATARACT IOL, RT/LT Bilateral 2010 aproximately      SECTION  1966      SECTION  1972     COLONOSCOPY  2009,     COLONOSCOPY       FINGER SURGERY Right 2019    Procedure: DISTAL ULNA RESECTION, RIGHT MIDDLE SILASTIC METACARPALPHALANGEAL EXCHANGE AND RIGHT ELBOW NODULE EXCISION.;  Surgeon: Hilario Redmond MD;  Location: [facility] Main OR;  Service: Orthopedics     FOOT SURGERY Left      GYN SURGERY  [age]     HAND SURGERY  Right      HAND SURGERY Right      HC ESOPH/GAS REFLUX TEST W NASAL IMPED >1 HR  02/01/2012    Procedure:ESOPHAGEAL IMPEDENCE FUNCTION TEST WITH 24 HOUR PH GREATER THAN 1 HOUR; Surgeon:KAYKAY JULIO; Location:[facility]     IR LUMBAR EPIDURAL STEROID INJECTION       IR TRANSLAMINAR EPIDURAL LUMBAR INJ INCL IMAGING  05/02/2012    LESI L5-S1 at [facility]     LUMBAR FUSION       OPTICAL TRACKING SYSTEM FUSION POSTERIOR SPINE LUMBAR N/A 04/03/2017    Procedure: OPTICAL TRACKING SYSTEM FUSION SPINE POSTERIOR LUMBAR ONE LEVEL;  Surgeon: Anthony Michele MD;  Location: [facility] OR     ORTHOPEDIC SURGERY  1991    left foot surgery     ORTHOPEDIC SURGERY  1995    R MCP surgery     ORTHOPEDIC SURGERY  2007    right knee total replacement     TOTAL HIP ARTHROPLASTY Right      TOTAL KNEE ARTHROPLASTY Left      TOTAL KNEE ARTHROPLASTY Right      ZZC ANESTH,TOTAL HIP ARTHROPLASTY  2010    Rt hip     ZZC HAND/FINGER SURGERY UNLISTED  11/2005    right hand     ZZC TOTAL KNEE ARTHROPLASTY  2006    left       Prior to Admission Medications   Prior to Admission Medications   Prescriptions Last Dose Informant Patient Reported? Taking?   CALCIUM CITRATE PO 2/10/2023  Yes Yes   Sig: Take 300 mg by mouth daily Take with meal that contains least amount of calcium   Cholecalciferol (VITAMIN D-3 PO) Past Week  Yes Yes   Sig: Take 5,000 Units by mouth Take every 5 days   Lidocaine (LIDOCARE) 4 % Patch   No No   Sig: Place 1 patch onto the skin every 24 hours To prevent lidocaine toxicity, patient should be patch free for 12 hrs daily.   Patient not taking: Reported on 1/30/2023   Methotrexate, PF, (RASUVO) 25 MG/0.5ML autoinjector 2/9/2023  No Yes   Sig: Inject 0.5 mLs (25 mg) Subcutaneous every 7 days . Hold for signs of infection, and seek medical attention.   Patient taking differently: Inject 25 mg Subcutaneous every 7 days . Hold for signs of infection, and seek medical attention. Take every Thursday.   Polyethyl Glycol-Propyl Glycol  (SYSTANE OP) prn at prn  Yes Yes   Sig: Apply 1-2 drops to eye 2 times daily as needed   Upadacitinib ER (RINVOQ) 15 MG  at am  No Yes   Sig: Take 15 mg by mouth daily   acetaminophen (TYLENOL) 650 MG CR tablet Past Week  Yes Yes   Sig: Take 1,300 mg by mouth daily   albuterol (PROAIR HFA/PROVENTIL HFA/VENTOLIN HFA) 108 (90 Base) MCG/ACT inhaler More than a month  No Yes   Sig: Inhale 2 puffs into the lungs every 6 hours   atorvastatin (LIPITOR) 40 MG tablet 2023 at am  No Yes   Sig: Take 1 tablet (40 mg) by mouth daily   busPIRone (BUSPAR) 10 MG tablet 2023 at am  No Yes   Sig: Take 1 tablet (10 mg) by mouth 2 times daily   desvenlafaxine (PRISTIQ) 100 MG 24 hr tablet 2023 at am  No Yes   Sig: Take 1 tablet (100 mg) by mouth daily   gabapentin (NEURONTIN) 300 MG capsule 2/10/2023 at hs  No Yes   Sig: Take 1 capsule (300 mg) by mouth 2 times daily   Patient taking differently: Take 600 mg by mouth 2 times daily as needed   guaiFENesin (MUCINEX) 600 MG 12 hr tablet 2/10/2023  No Yes   Sig: Take 1 tablet (600 mg) by mouth 2 times daily   hydrocortisone, Perianal, (HYDROCORTISONE) 2.5 % cream   No No   Sig: Place rectally 2 times daily as needed for hemorrhoids   ipratropium (ATROVENT) 0.06 % nasal spray 2/10/2023  No Yes   Si sprays in each nostril, 2-3 times per day as needed for rhinitis   leucovorin (WELLCOVORIN) 5 MG tablet 2/10/2023  No Yes   Sig: Take 1 tablet (5 mg) by mouth every 7 days . Take 24 hours after taking Methotrexate each week.   loratadine (CLARITIN) 10 MG tablet 2023 at am  Yes Yes   Sig: Take 10 mg by mouth 2 times daily   naproxen sodium (ANAPROX) 220 MG tablet Past Week  Yes Yes   Sig: Take 440 mg by mouth daily as needed for moderate pain (4-6) (less than monthly use currently)   pantoprazole (PROTONIX) 40 MG EC tablet 2023 at am  No Yes   Sig: TAKE ONE TABLET BY MOUTH ONCE DAILY 30-60 MINUTES BEFORE A MEAL   sucralfate (CARAFATE) 1 GM tablet More  "than a month  No Yes   Sig: TAKE ONE TABLET BY MOUTH FOUR TIMES A DAY AS NEEDED HEATBURN   Patient taking differently: Take 1 g by mouth 4 times daily as needed TAKE ONE TABLET BY MOUTH FOUR TIMES A DAY AS NEEDED HEARTBURN   triamcinolone (KENALOG) 0.1 % external ointment 2/10/2023  Yes Yes   Sig: Apply 1 applicator topically daily Apply to back   vitamin C (ASCORBIC ACID) 1000 MG TABS 2023 at am  Yes Yes   Sig: Take 1,000 mg by mouth daily   zinc gluconate 50 MG tablet 2023 at am  Yes Yes   Sig: Take 50 mg by mouth daily   zoledronic Acid (RECLAST) 5 MG/100ML SOLN infusion last year  Yes No   Sig: Inject 5 mg into the vein once Every year (next dose 2023)      Facility-Administered Medications: None        Social History   I have reviewed this patient's social history and updated it with pertinent information if needed.  Social History     Tobacco Use     Smoking status: Former     Packs/day: 1.00     Years: 41.00     Pack years: 41.00     Types: Cigarettes     Quit date: 1999     Years since quittin.1     Smokeless tobacco: Never     Tobacco comments:     former smoker   Vaping Use     Vaping Use: Never used   Substance Use Topics     Alcohol use: Yes     Alcohol/week: 0.0 standard drinks     Comment: Occasionally,  usually wine     Drug use: No       Allergies   Allergies   Allergen Reactions     Abatacept Other (See Comments)     Severe headaches  Migraine     Celebrex [Celecoxib]      Ineffective     Celecoxib Unknown and Nausea     Ethanol      Antihistamines     Levaquin [Levofloxacin] Fatigue     Severe body pain     Orencia [Abatacept]      Headache     Septra [Bactrim]      Sulfa Drugs Nausea and Vomiting     \"deathly ill\"  Allergic to everything with Sulfa in it.     Sulfamethoxazole-Trimethoprim Nausea and Nausea and Vomiting     Tramadol Other (See Comments)     Headache  Migraine headache     Valdecoxib Unknown and Nausea     Made me \"very very ill\", might of been \"cramping\" "     Adhesive Tape Rash     Plastic tape  Plastic tapes     Antihistamines, Chlorpheniramine-Type  [Alkylamines] Anxiety and Other (See Comments)        Physical Exam   Vital Signs: Temp: 97.3  F (36.3  C) Temp src: Oral BP: 137/70 Pulse: 63   Resp: 20 SpO2: 96 % O2 Device: None (Room air)    Weight: 0 lbs 0 oz     GENERAL:  Alert, uncomfortable if moving arms, No acute distress. Sitting up in bed.  PSYCH: pleasant, oriented.  HEENT:  Normocephalic,  No scleral icterus or conjunctival injection, normal hearing  NECK:  Supple  HEART:  Normal S1, S2 with a murmur, RRR  LUNGS:  Normal Respiratory effort. Clear to auscultation bilaterally with no wheezing, rales or ronchi.  ABDOMEN:  Soft, non-tender, non distended. No peritoneal signs.   EXTREMITIES: CMS intact, No pedal edema, No cyanosis.   SKIN:  Warm, dry to touch. No rash.  NEUROLOGIC: Speech clear, alert & orientated x 4, no focal deficits.     Medical Decision Making       MANAGEMENT DISCUSSED with the following over the past 24 hours: patient, nurse   NOTE(S)/MEDICAL RECORDS REVIEWED over the past 24 hours: labs, imaging, progress notes      Data     I have personally reviewed the following data over the past 24 hrs:    5.7  \   10.5 (L)   / 406     138 103 12.1 /  112 (H)   3.9 25 0.51 \       Imaging results reviewed over the past 24 hrs:   Recent Results (from the past 24 hour(s))   CT Facial Bones without Contrast    Narrative    EXAM: CT FACIAL BONES WITHOUT CONTRAST  LOCATION: [facility]  DATE/TIME: 02/11/2023, 9:00 AM    INDICATION: Facial trauma.  COMPARISON: CT head same day.  TECHNIQUE: Routine CT Maxillofacial without IV contrast. Multiplanar reformats. Dose reduction techniques were used.     FINDINGS: The pterygoid plates are intact. The bilateral zygomatic arches, sphenotemporal buttresses, the walls of both orbits, and the walls of the maxillary sinuses appear intact. No displaced nasal arch or nasal septal fracture  identified. The   anterior skull base appears intact. The mandible appears intact. The temporomandibular joints are normally located.    Bilateral lens implants. The visualized intraorbital soft tissues otherwise appear grossly unremarkable. There may be some mild swelling overlying the nasal arch. There may be some mild soft tissue swelling along the upper anterior aspect of the nasal   septum (series 4 image 34). Recommend clinical correlation.    Mild scattered mucosal thickening in the paranasal sinuses without air-fluid levels. The visualized aspects of the mastoid and middle ear cavities are clear. Bilateral temporomandibular joint degenerative changes are seen. Please see separate report from   head CT of same day for details regarding intracranial findings. Partially visualized degenerative changes in the cervical spine.      Impression    IMPRESSION:  1.  No acute displaced maxillofacial fracture identified.  2.  Possible soft tissue swelling overlying the nasal arch. Possible soft tissue swelling along the anterior aspect of the nasal septum. Please correlate clinically.     Cervical spine CT w/o contrast    Narrative    EXAM: CT CERVICAL SPINE WITHOUT CONTRAST  LOCATION: [facility]  DATE/TIME: 02/11/2023, 9:02 AM    INDICATION: Fall, trauma.  COMPARISON: None.  TECHNIQUE: Routine CT Cervical Spine without IV contrast. Multiplanar reformats. Dose reduction techniques were used.    FINDINGS:  VERTEBRA: No acute cervical spine fracture is identified. Normal vertebral body heights. Anterolisthesis of C3 on C4 measuring 2-3 mm. Anterolisthesis of C4 on C5 measuring 1 mm. Anterolisthesis of C7 on T1 measuring 2-3 mm. Anterolisthesis of T1 on T2   measuring 1 mm. Mild levoconvex curvature of the cervicothoracic spine with apex at the cervicothoracic junction.    CANAL/FORAMINA: Mild multilevel disc height loss. Marginal endplate osteophytes. Scattered mild/moderate multilevel degenerative  facet disease. Mild to moderate degenerative change at the median atlantoaxial joint. No high-grade central spinal canal   stenosis is identified. No high-grade osseous neural foraminal stenosis.    PARASPINAL: Hypodense left-sided thyroid nodule measuring up to approximately 23 mm craniocaudal (series 11 image 4). The visualized paraspinous soft tissues otherwise appear grossly unremarkable.      Impression    IMPRESSION:  1.  No acute fracture.  2.  Multilevel spondylolisthesis, as described.  3.  Degenerative changes, as described. No high-grade spinal canal or neural foraminal stenosis.  4.  Left thyroid nodule measuring over 20 mm. Follow-up thyroid ultrasound is suggested.     Head CT w/o contrast    Narrative    EXAM: CT HEAD WITHOUT CONTRAST  LOCATION: [facility]  DATE/TIME: 02/11/2023, 9:02 AM    INDICATION: Fall, trauma.  COMPARISON: 10/06/2015.  TECHNIQUE: Routine CT Head without IV contrast. Multiplanar reformats. Dose reduction techniques were used.    FINDINGS:  INTRACRANIAL CONTENTS: No intracranial hemorrhage, extra-axial collection, or mass effect.  No CT evidence of acute infarct. Moderate presumed chronic small vessel ischemic changes. Mild to moderate generalized volume loss. No hydrocephalus.     VISUALIZED ORBITS/SINUSES/MASTOIDS: Prior bilateral cataract surgery. Visualized portions of the orbits are otherwise unremarkable. Trace scattered mucosal thickening in the paranasal sinuses. No middle ear or mastoid effusion.    BONES/SOFT TISSUES: No acute abnormality. Left greater than right temporomandibular joint degenerative changes. Ovoid soft tissue nodule again seen in the subcutaneous tissues overlying the midline occipital bone measuring 14 mm with small internal   coarse calcification. This is nonspecific, but may represent a sebaceous cyst/epidermal inclusion cyst.      Impression    IMPRESSION:  1.  No CT evidence for acute intracranial process.  2.  Brain atrophy  and presumed chronic microvascular ischemic changes as above.     XR Wrist Left 2 Views    Narrative    EXAM: XR FOREARM LEFT 2 VIEWS, XR WRIST LEFT 2 VIEWS  LOCATION: [facility]  DATE/TIME: 2/11/2023 9:13 AM    INDICATION: arm pain  COMPARISON: None.      Impression    IMPRESSION: Complex, comminuted and impacted intra-articular distal radius fracture with displacement and dorsal angulation. Minimally displaced oblique fracture of the distal ulnar shaft.     The more proximal radius and ulna of the forearm are intact. Bones are demineralized. There is mild degenerative change about the elbow with some cortical irregularity of the radial head which is favored to be degenerative, however there is concern for   an acute left elbow fracture, recommend dedicated left elbow radiographs.    NOTE: ABNORMAL REPORT    THE DICTATION ABOVE DESCRIBES AN ABNORMALITY FOR WHICH FOLLOW-UP IS NEEDED.       Elbow XR, G/E 3 views, right    Narrative    EXAM: XR ELBOW RIGHT G/E 3 VIEWS  LOCATION: [facility]  DATE/TIME: 2/11/2023 9:13 AM    INDICATION: R ELBOW PAIN  COMPARISON: None.      Impression    IMPRESSION: Complex, comminuted fracture of the olecranon and proximal ulna where there is intra-articular involvement and fracture fragment distraction up to 2.3 cm. Soft tissue swelling and a complex right elbow joint effusion.         NOTE: ABNORMAL REPORT    THE DICTATION ABOVE DESCRIBES AN ABNORMALITY FOR WHICH FOLLOW-UP IS NEEDED.    Radius/Ulna XR,  PA &LAT, left    Narrative    EXAM: XR FOREARM LEFT 2 VIEWS, XR WRIST LEFT 2 VIEWS  LOCATION: [facility]  DATE/TIME: 2/11/2023 9:13 AM    INDICATION: arm pain  COMPARISON: None.      Impression    IMPRESSION: Complex, comminuted and impacted intra-articular distal radius fracture with displacement and dorsal angulation. Minimally displaced oblique fracture of the distal ulnar shaft.     The more proximal radius and  ulna of the forearm are intact. Bones are demineralized. There is mild degenerative change about the elbow with some cortical irregularity of the radial head which is favored to be degenerative, however there is concern for   an acute left elbow fracture, recommend dedicated left elbow radiographs.    NOTE: ABNORMAL REPORT    THE DICTATION ABOVE DESCRIBES AN ABNORMALITY FOR WHICH FOLLOW-UP IS NEEDED.       CT Elbow Right w/o Contrast    Narrative    EXAM: CT ELBOW RIGHT W/O CONTRAST  LOCATION: [facility]  DATE/TIME: 2/11/2023 2:02 PM    INDICATION: r elbow frx  COMPARISON: Same-day radiograph.  TECHNIQUE: Noncontrast. Axial, sagittal and coronal thin-section reconstruction. Dose reduction techniques were used.     FINDINGS:   Patient positioning, bone demineralization and artifact limits fine bony detail.     Again seen is a complex comminuted intra-articular ulnar fracture with up to 2.3 cm of fracture fragment distraction. There is a complex joint effusion and surrounding soft tissue swelling.    There is no evidence of a displaced fracture elsewhere. There is mild degenerative change at the radiocapitellar articulation without definitive evidence of a radial head fracture.    No dislocation.      Impression    IMPRESSION:  1.  Complex comminuted intra-articular ulnar fracture with up to 2.3 cm of fracture fragment distraction.  2.  No definitive evidence of an additional fracture about the elbow.  3.  Bones are demineralized.     CT Wrist Left w/o Contrast    Narrative    [facility]  CT OF THE LEFT WRIST.  02/11/2023, 2:07 PM    INDICATION: Left wrist fracture.  TECHNIQUE: Noncontrast. Axial, sagittal and coronal thin-section reconstruction. Dose reduction techniques were used.  COMPARISON: Same-day radiograph.    FINDINGS: Again seen is a complex comminuted impacted and displaced intra-articular distal radius fracture. Fine bony detail is limited due to patient  positioning, artifact and bone demineralization. There is a moderate amount of dorsal angulation along   the radial articular surface.    Mild displaced oblique fracture of the distal ulnar shaft with up to 4 mm of displacement. There is no definitive evidence of an additional fracture about the wrist. Scattered arthritic changes are again seen.      Impression    IMPRESSION:  1.  Comminuted, impacted and displaced intra-articular distal radius fracture again seen.  2.  Mildly displaced distal ulnar shaft fracture.  3.  No definitive evidence of an additional fracture about the wrist.  4.  Fine bony detail is limited due to patient positioning, artifact and bone demineralization.

## 2023-02-11 NOTE — CONSULTS
Ortho Hand    Full consult to follow.    NPO after midnight  Hold Rinvoq tomorrow  Planning ORIF right olecranon and left distal radius with likely dorsal bridge plate.  Discussed surgery with patient and her daughter at bedside.  Consider internal jugular for vascular access - discussed with MANUEL.  Will evaluate patient and consider.  Need access out of arms for surgery.    All questions answered.    Pili Brock MD  [facility]

## 2023-02-11 NOTE — PHARMACY-ADMISSION MEDICATION HISTORY
"Admission medication history interview status for this patient is complete. See [facility] admission navigator for allergy information, prior to admission medications and immunization status.     Medication history interview done, indicate source(s): Patient  Medication history resources (including written lists, pill bottles, clinic record): SureScripts, Care Everywhere, Past encounters  Pharmacy: [facility]/specialty order     Changes made to PTA medication list:  Added: systane  Changed: gabapentin from 300 BID to 600 BID, vitamin D from 1,000 every day to 5,000 every 5 days, calcium citrate to take with meal that has least amount of calcium, sucrlafate to 4 times daily PRN, and triamcinolone applied to back   Reported as Not Taking: lidocaine 4% patch, hydrocortisone 2.5% cream,    Removed: Venlafaxine --     Actions taken by pharmacist (provider contacted, etc): will leave MD a note that med rec is complete     Additional medication history information: patient was with daughter in the room. Patient stated that they had taken tramadol for headache in the past and it made it a lot worse-- patient stated that it should be on her \"do not give\" list. Patient also stated that they are supposed to be taking 1,000 units of vitamin D but has been taking 5,000 units every 5 days instead. Patient did not state which days they take their vitamin D but did have it this past week. Methotrexate is to take every Thursday and Leucovorin every Friday (24 hours after taking Methotrexate)     Medication reconciliation/reorder completed by provider prior to medication history?  N   (Y/N)   Admission medication history interview status for this patient is complete. See [facility] admission navigator for allergy information, prior to admission medications and immunization status.     Prior to Admission medications    Medication Sig Last Dose Taking? Auth Provider Long Term End Date   acetaminophen (TYLENOL) 650 " MG CR tablet Take 1,300 mg by mouth daily Past Week Yes Reported, Patient     albuterol (PROAIR HFA/PROVENTIL HFA/VENTOLIN HFA) 108 (90 Base) MCG/ACT inhaler Inhale 2 puffs into the lungs every 6 hours More than a month Yes Ariane Hu NP Yes    atorvastatin (LIPITOR) 40 MG tablet Take 1 tablet (40 mg) by mouth daily 2/11/2023 at am Yes Dahiana Flores APRN CNP Yes    busPIRone (BUSPAR) 10 MG tablet Take 1 tablet (10 mg) by mouth 2 times daily 2/11/2023 at am Yes Duy Leyva MD Yes    CALCIUM CITRATE PO Take 300 mg by mouth daily Take with meal that contains least amount of calcium 2/10/2023 Yes Reported, Patient     Cholecalciferol (VITAMIN D-3 PO) Take 5,000 Units by mouth Take every 5 days Past Week Yes Reported, Patient     desvenlafaxine (PRISTIQ) 100 MG 24 hr tablet Take 1 tablet (100 mg) by mouth daily 2/11/2023 at am Yes Duy Leyva MD Yes    ipratropium (ATROVENT) 0.06 % nasal spray 2 sprays in each nostril, 2-3 times per day as needed for rhinitis 2/10/2023 Yes Nico Byers MD     leucovorin (WELLCOVORIN) 5 MG tablet Take 1 tablet (5 mg) by mouth every 7 days . Take 24 hours after taking Methotrexate each week. 2/10/2023 Yes Nico Byers MD Yes    loratadine (CLARITIN) 10 MG tablet Take 10 mg by mouth 2 times daily 2/11/2023 at am Yes Reported, Patient     naproxen sodium (ANAPROX) 220 MG tablet Take 440 mg by mouth daily as needed for moderate pain (4-6) (less than monthly use currently) Past Week Yes Reported, Patient     pantoprazole (PROTONIX) 40 MG EC tablet TAKE ONE TABLET BY MOUTH ONCE DAILY 30-60 MINUTES BEFORE A MEAL 2/11/2023 at am Yes Duy Leyva MD     Polyethyl Glycol-Propyl Glycol (SYSTANE OP) Apply 1-2 drops to eye 2 times daily as needed prn at prn Yes Unknown, Entered By History     sucralfate (CARAFATE) 1 GM tablet TAKE ONE TABLET BY MOUTH FOUR TIMES A DAY AS NEEDED HEARTBURN  Patient taking differently: Take 1 g by mouth 4 times daily as needed TAKE ONE  TABLET BY MOUTH FOUR TIMES A DAY AS NEEDED HEATBURN More than a month Yes Dahiana Flores APRN CNP     triamcinolone (KENALOG) 0.1 % external ointment Apply 1 applicator topically daily Apply to back 2/10/2023 Yes Gisela Goodwin MD     Upadacitinib ER (RINVOQ) 15 MG TB24 Take 15 mg by mouth daily 2/11/2023 at am Yes Nico Byers MD     vitamin C (ASCORBIC ACID) 1000 MG TABS Take 1,000 mg by mouth daily 2/11/2023 at am Yes Reported, Patient     zinc gluconate 50 MG tablet Take 50 mg by mouth daily 2/11/2023 at am Yes Reported, Patient     gabapentin (NEURONTIN) 300 MG capsule Take 1 capsule (300 mg) by mouth 2 times daily  Patient taking differently: Take 600 mg by mouth 2 times daily as needed 2/10/2023 at hs yes Duy Leyva MD Yes    guaiFENesin (MUCINEX) 600 MG 12 hr tablet Take 1 tablet (600 mg) by mouth 2 times daily 2/10/2023 yes Kuldip Tyson MD     hydrocortisone, Perianal, (HYDROCORTISONE) 2.5 % cream Place rectally 2 times daily as needed for hemorrhoids  Patient not taking: Reported on 2/11/2023 Not Taking  Dahiana Flores APRN CNP     Lidocaine (LIDOCARE) 4 % Patch Place 1 patch onto the skin every 24 hours To prevent lidocaine toxicity, patient should be patch free for 12 hrs daily.  Patient not taking: Reported on 1/30/2023 Not taking  Duy Leyva MD     Methotrexate, PF, (RASUVO) 25 MG/0.5ML autoinjector Inject 0.5 mLs (25 mg)  Subcutaneous every Thursday . Hold for signs of infection, and seek medical attention. 2/9/2023 yes Nico Byers MD Yes    zoledronic Acid (RECLAST) 5 MG/100ML SOLN infusion Inject 5 mg into the vein once Every year (next dose March 2023) last year (due March 2023) yes Reported, Patient

## 2023-02-11 NOTE — ED TRIAGE NOTES
Pt brought in by EMS for fall and right wrist deformity. Pt was walking her dog and tripped. CMS intact.     Triage Assessment     Row Name 02/11/23 0804       Triage Assessment (Adult)    Airway WDL WDL       Respiratory WDL    Respiratory WDL WDL       Skin Circulation/Temperature WDL    Skin Circulation/Temperature WDL WDL       Cardiac WDL    Cardiac WDL WDL       Peripheral/Neurovascular WDL    Peripheral Neurovascular WDL WDL       Cognitive/Neuro/Behavioral WDL    Cognitive/Neuro/Behavioral WDL WDL

## 2023-02-11 NOTE — ED NOTES
"[facility]  ED Nurse Handoff Report    Ginger Marshall is a 82 year old female   ED Chief complaint: Fall  . ED Diagnosis:   Final diagnoses:   Other closed intra-articular fracture of distal end of left radius, initial encounter   Closed fracture of shaft of left ulna, unspecified fracture morphology, initial encounter   Closed fracture of olecranon process of right ulna, initial encounter   Fall, initial encounter     Allergies:   Allergies   Allergen Reactions     Abatacept Other (See Comments)     Severe headaches  Migraine     Celebrex [Celecoxib]      Ineffective     Celecoxib Unknown and Nausea     Ethanol      Antihistamines     Levaquin [Levofloxacin] Fatigue     Severe body pain     Orencia [Abatacept]      Headache     Septra [Bactrim]      Sulfa Drugs Nausea and Vomiting     \"deathly ill\"  Allergic to everything with Sulfa in it.     Sulfamethoxazole-Trimethoprim Nausea and Nausea and Vomiting     Tramadol Other (See Comments)     Headache  Migraine headache     Valdecoxib Unknown and Nausea     Made me \"very very ill\", might of been \"cramping\"     Adhesive Tape Rash     Plastic tape  Plastic tapes     Antihistamines, Chlorpheniramine-Type  [Alkylamines] Anxiety and Other (See Comments)       Code Status: Full Code  Activity level - Baseline/Home:  Independent. Activity Level - Current:   Stand by Assist. Lift room needed: No. Bariatric: No   Needed: No   Isolation: No. Infection: Not Applicable.     Vital Signs:   Vitals:    02/11/23 0803   BP: 137/70   Pulse: 63   Resp: 20   Temp: 97.3  F (36.3  C)   TempSrc: Oral   SpO2: 98%       Cardiac Rhythm:  ,      Pain level:    Patient confused: No. Patient Falls Risk: Yes.   Elimination Status: Has voided   Patient Report - Initial Complaint: Fall with left wrist deformity. Focused Assessment: Ginger Marshall is a 82 year old female with a history of osteoporosis who presents via EMS with left wrist pain, right elbow pain and facial pain " after a mechanical fall. She was walking her dog this morning outside when she fell. EMS reports that she fell on her face hitting her nose and chipping her tooth. Denies loss of consciousness, use of blood thinners, headache or neck pain. EMS reports a deformity to her left wrist. She also notes of right elbow pain. EMS gave 50 mcg fentanyl. Denies headache, neck pain, back pain, chest pain or shortness of breath. Last Tdap 12/12/18.  No history anticoagulation   Tests Performed:   Labs Ordered and Resulted from Time of ED Arrival to Time of ED Departure   BASIC METABOLIC PANEL - Abnormal       Result Value    Sodium 138      Potassium 3.9      Chloride 103      Carbon Dioxide (CO2) 25      Anion Gap 10      Urea Nitrogen 12.1      Creatinine 0.51      Calcium 9.3      Glucose 112 (*)     GFR Estimate >90     CBC WITH PLATELETS AND DIFFERENTIAL - Abnormal    WBC Count 5.7      RBC Count 3.64 (*)     Hemoglobin 10.5 (*)     Hematocrit 34.5 (*)     MCV 95      MCH 28.8      MCHC 30.4 (*)     RDW 15.8 (*)     Platelet Count 406      % Neutrophils 69      % Lymphocytes 17      % Monocytes 10      % Eosinophils 3      % Basophils 0      % Immature Granulocytes 1      NRBCs per 100 WBC 0      Absolute Neutrophils 4.0      Absolute Lymphocytes 1.0      Absolute Monocytes 0.6      Absolute Eosinophils 0.2      Absolute Basophils 0.0      Absolute Immature Granulocytes 0.0      Absolute NRBCs 0.0     TYPE AND SCREEN, ADULT    ABO/RH(D) O POS      Antibody Screen Negative      SPECIMEN EXPIRATION DATE [id number]     ABO/RH TYPE AND SCREEN     Radius/Ulna XR,  PA &LAT, left   Final Result   IMPRESSION: Complex, comminuted and impacted intra-articular distal radius fracture with displacement and dorsal angulation. Minimally displaced oblique fracture of the distal ulnar shaft.       The more proximal radius and ulna of the forearm are intact. Bones are demineralized. There is mild degenerative change about the elbow with  some cortical irregularity of the radial head which is favored to be degenerative, however there is concern for    an acute left elbow fracture, recommend dedicated left elbow radiographs.      NOTE: ABNORMAL REPORT      THE DICTATION ABOVE DESCRIBES AN ABNORMALITY FOR WHICH FOLLOW-UP IS NEEDED.           Elbow XR, G/E 3 views, right   Final Result   IMPRESSION: Complex, comminuted fracture of the olecranon and proximal ulna where there is intra-articular involvement and fracture fragment distraction up to 2.3 cm. Soft tissue swelling and a complex right elbow joint effusion.             NOTE: ABNORMAL REPORT      THE DICTATION ABOVE DESCRIBES AN ABNORMALITY FOR WHICH FOLLOW-UP IS NEEDED.       XR Wrist Left 2 Views   Final Result   IMPRESSION: Complex, comminuted and impacted intra-articular distal radius fracture with displacement and dorsal angulation. Minimally displaced oblique fracture of the distal ulnar shaft.       The more proximal radius and ulna of the forearm are intact. Bones are demineralized. There is mild degenerative change about the elbow with some cortical irregularity of the radial head which is favored to be degenerative, however there is concern for    an acute left elbow fracture, recommend dedicated left elbow radiographs.      NOTE: ABNORMAL REPORT      THE DICTATION ABOVE DESCRIBES AN ABNORMALITY FOR WHICH FOLLOW-UP IS NEEDED.           Head CT w/o contrast   Preliminary Result   IMPRESSION:   1.  No CT evidence for acute intracranial process.   2.  Brain atrophy and presumed chronic microvascular ischemic changes as above.         Cervical spine CT w/o contrast   Preliminary Result   IMPRESSION:   1.  No acute fracture.   2.  Multilevel spondylolisthesis, as described.   3.  Degenerative changes, as described. No high-grade spinal canal or neural foraminal stenosis.   4.  Left thyroid nodule measuring over 20 mm. Follow-up thyroid ultrasound is suggested.         CT Facial Bones without  Contrast   Preliminary Result   IMPRESSION:   1.  No acute displaced maxillofacial fracture identified.   2.  Possible soft tissue swelling overlying the nasal arch. Possible soft tissue swelling along the anterior aspect of the nasal septum. Please correlate clinically.         XR Wrist Port Left 2 Views    (Results Pending)   . Abnormal Results: See above.   Treatments provided: See MAR  Family Comments: Daughter at bedside  OBS brochure/video discussed/provided to patient:  Yes  ED Medications:   Medications   HYDROmorphone (PF) (DILAUDID) injection 0.3 mg (0.3 mg Intravenous Given 2/11/23 0800)   ondansetron (ZOFRAN) injection 4 mg (4 mg Intravenous Given 2/11/23 1037)   HYDROmorphone (PF) (DILAUDID) injection 0.3 mg (0.3 mg Intravenous Given 2/11/23 1043)   oxyCODONE (ROXICODONE) tablet 5 mg (5 mg Oral Given 2/11/23 1032)     Drips infusing:  No  For the majority of the shift, the patient's behavior Green. Interventions performed were N/A.    Sepsis treatment initiated: No     Patient tested for COVID 19 prior to admission: NO    ED Nurse Name/Phone Number: Tati Washburn RN,   11:28 AM      RECEIVING UNIT ED HANDOFF REVIEW    Above ED Nurse Handoff Report was reviewed: Yes  Reviewed by: Thalia Agosto RN on February 11, 2023 at 2:02 PM

## 2023-02-12 ENCOUNTER — ANESTHESIA (OUTPATIENT)
Dept: SURGERY | Facility: CLINIC | Age: 83
DRG: 511 | End: 2023-02-12
Payer: COMMERCIAL

## 2023-02-12 ENCOUNTER — APPOINTMENT (OUTPATIENT)
Dept: GENERAL RADIOLOGY | Facility: CLINIC | Age: 83
DRG: 511 | End: 2023-02-12
Attending: ORTHOPAEDIC SURGERY
Payer: COMMERCIAL

## 2023-02-12 LAB
ANION GAP SERPL CALCULATED.3IONS-SCNC: 9 MMOL/L (ref 7–15)
BUN SERPL-MCNC: 10.3 MG/DL (ref 8–23)
CALCIUM SERPL-MCNC: 8.6 MG/DL (ref 8.8–10.2)
CHLORIDE SERPL-SCNC: 102 MMOL/L (ref 98–107)
CREAT SERPL-MCNC: 0.52 MG/DL (ref 0.51–0.95)
DEPRECATED HCO3 PLAS-SCNC: 25 MMOL/L (ref 22–29)
ERYTHROCYTE [DISTWIDTH] IN BLOOD BY AUTOMATED COUNT: 15.8 % (ref 10–15)
GFR SERPL CREATININE-BSD FRML MDRD: >90 ML/MIN/1.73M2
GLUCOSE SERPL-MCNC: 159 MG/DL (ref 70–99)
HCT VFR BLD AUTO: 30.3 % (ref 35–47)
HGB BLD-MCNC: 9.4 G/DL (ref 11.7–15.7)
MCH RBC QN AUTO: 30.3 PG (ref 26.5–33)
MCHC RBC AUTO-ENTMCNC: 31 G/DL (ref 31.5–36.5)
MCV RBC AUTO: 98 FL (ref 78–100)
PLATELET # BLD AUTO: 315 10E3/UL (ref 150–450)
POTASSIUM SERPL-SCNC: 3.8 MMOL/L (ref 3.4–5.3)
RBC # BLD AUTO: 3.1 10E6/UL (ref 3.8–5.2)
SODIUM SERPL-SCNC: 136 MMOL/L (ref 136–145)
WBC # BLD AUTO: 10 10E3/UL (ref 4–11)

## 2023-02-12 PROCEDURE — 258N000003 HC RX IP 258 OP 636: Performed by: NURSE ANESTHETIST, CERTIFIED REGISTERED

## 2023-02-12 PROCEDURE — 250N000013 HC RX MED GY IP 250 OP 250 PS 637

## 2023-02-12 PROCEDURE — 120N000001 HC R&B MED SURG/OB

## 2023-02-12 PROCEDURE — 250N000013 HC RX MED GY IP 250 OP 250 PS 637: Performed by: HOSPITALIST

## 2023-02-12 PROCEDURE — 258N000003 HC RX IP 258 OP 636

## 2023-02-12 PROCEDURE — 250N000013 HC RX MED GY IP 250 OP 250 PS 637: Performed by: ORTHOPAEDIC SURGERY

## 2023-02-12 PROCEDURE — 272N000002 HC OR SUPPLY OTHER OPNP: Performed by: ORTHOPAEDIC SURGERY

## 2023-02-12 PROCEDURE — 250N000009 HC RX 250: Performed by: ANESTHESIOLOGY

## 2023-02-12 PROCEDURE — C1713 ANCHOR/SCREW BN/BN,TIS/BN: HCPCS | Performed by: ORTHOPAEDIC SURGERY

## 2023-02-12 PROCEDURE — 250N000009 HC RX 250: Performed by: NURSE ANESTHETIST, CERTIFIED REGISTERED

## 2023-02-12 PROCEDURE — 0PSK04Z REPOSITION RIGHT ULNA WITH INTERNAL FIXATION DEVICE, OPEN APPROACH: ICD-10-PCS | Performed by: ORTHOPAEDIC SURGERY

## 2023-02-12 PROCEDURE — 85014 HEMATOCRIT: CPT | Performed by: ORTHOPAEDIC SURGERY

## 2023-02-12 PROCEDURE — 99232 SBSQ HOSP IP/OBS MODERATE 35: CPT

## 2023-02-12 PROCEDURE — 250N000011 HC RX IP 250 OP 636: Performed by: NURSE ANESTHETIST, CERTIFIED REGISTERED

## 2023-02-12 PROCEDURE — 250N000011 HC RX IP 250 OP 636: Performed by: ORTHOPAEDIC SURGERY

## 2023-02-12 PROCEDURE — 999N000179 XR SURGERY CARM FLUORO LESS THAN 5 MIN W STILLS: Mod: TC

## 2023-02-12 PROCEDURE — 272N000001 HC OR GENERAL SUPPLY STERILE: Performed by: ORTHOPAEDIC SURGERY

## 2023-02-12 PROCEDURE — 258N000001 HC RX 258: Performed by: ORTHOPAEDIC SURGERY

## 2023-02-12 PROCEDURE — 250N000025 HC SEVOFLURANE, PER MIN: Performed by: ORTHOPAEDIC SURGERY

## 2023-02-12 PROCEDURE — 370N000017 HC ANESTHESIA TECHNICAL FEE, PER MIN: Performed by: ORTHOPAEDIC SURGERY

## 2023-02-12 PROCEDURE — 999N000141 HC STATISTIC PRE-PROCEDURE NURSING ASSESSMENT: Performed by: ORTHOPAEDIC SURGERY

## 2023-02-12 PROCEDURE — 360N000084 HC SURGERY LEVEL 4 W/ FLUORO, PER MIN: Performed by: ORTHOPAEDIC SURGERY

## 2023-02-12 PROCEDURE — 80048 BASIC METABOLIC PNL TOTAL CA: CPT | Performed by: ORTHOPAEDIC SURGERY

## 2023-02-12 PROCEDURE — 710N000009 HC RECOVERY PHASE 1, LEVEL 1, PER MIN: Performed by: ORTHOPAEDIC SURGERY

## 2023-02-12 PROCEDURE — 0PSJ04Z REPOSITION LEFT RADIUS WITH INTERNAL FIXATION DEVICE, OPEN APPROACH: ICD-10-PCS | Performed by: ORTHOPAEDIC SURGERY

## 2023-02-12 DEVICE — SCREW NON-LOCKING HEXALOBE 3.5MM X 12MM: Type: IMPLANTABLE DEVICE | Site: WRIST | Status: FUNCTIONAL

## 2023-02-12 DEVICE — SCREW NON-LOCKING HEXALOBE 3.5MM X 14MM: Type: IMPLANTABLE DEVICE | Site: ELBOW | Status: FUNCTIONAL

## 2023-02-12 DEVICE — SCREW NON-LOCKING HEXALOBE 3.5MM X 16MM: Type: IMPLANTABLE DEVICE | Site: ELBOW | Status: FUNCTIONAL

## 2023-02-12 DEVICE — IMPLANTABLE DEVICE: Type: IMPLANTABLE DEVICE | Site: ELBOW | Status: FUNCTIONAL

## 2023-02-12 DEVICE — IMPLANTABLE DEVICE: Type: IMPLANTABLE DEVICE | Site: WRIST | Status: FUNCTIONAL

## 2023-02-12 RX ORDER — KETOROLAC TROMETHAMINE 30 MG/ML
INJECTION, SOLUTION INTRAMUSCULAR; INTRAVENOUS PRN
Status: DISCONTINUED | OUTPATIENT
Start: 2023-02-12 | End: 2023-02-12

## 2023-02-12 RX ORDER — LIDOCAINE 40 MG/G
CREAM TOPICAL
Status: DISCONTINUED | OUTPATIENT
Start: 2023-02-12 | End: 2023-02-12 | Stop reason: HOSPADM

## 2023-02-12 RX ORDER — ONDANSETRON 4 MG/1
4 TABLET, ORALLY DISINTEGRATING ORAL EVERY 30 MIN PRN
Status: DISCONTINUED | OUTPATIENT
Start: 2023-02-12 | End: 2023-02-12 | Stop reason: HOSPADM

## 2023-02-12 RX ORDER — DEXAMETHASONE SODIUM PHOSPHATE 4 MG/ML
INJECTION, SOLUTION INTRA-ARTICULAR; INTRALESIONAL; INTRAMUSCULAR; INTRAVENOUS; SOFT TISSUE PRN
Status: DISCONTINUED | OUTPATIENT
Start: 2023-02-12 | End: 2023-02-12

## 2023-02-12 RX ORDER — HYDRALAZINE HYDROCHLORIDE 20 MG/ML
2.5-5 INJECTION INTRAMUSCULAR; INTRAVENOUS EVERY 10 MIN PRN
Status: DISCONTINUED | OUTPATIENT
Start: 2023-02-12 | End: 2023-02-12 | Stop reason: HOSPADM

## 2023-02-12 RX ORDER — KETOROLAC TROMETHAMINE 15 MG/ML
15 INJECTION, SOLUTION INTRAMUSCULAR; INTRAVENOUS
Status: DISCONTINUED | OUTPATIENT
Start: 2023-02-12 | End: 2023-02-12 | Stop reason: HOSPADM

## 2023-02-12 RX ORDER — ACETAMINOPHEN 325 MG/1
975 TABLET ORAL ONCE
Status: DISCONTINUED | OUTPATIENT
Start: 2023-02-12 | End: 2023-02-12

## 2023-02-12 RX ORDER — METHADONE HYDROCHLORIDE 10 MG/ML
INJECTION, SOLUTION INTRAMUSCULAR; INTRAVENOUS; SUBCUTANEOUS PRN
Status: DISCONTINUED | OUTPATIENT
Start: 2023-02-12 | End: 2023-02-12

## 2023-02-12 RX ORDER — CEFAZOLIN SODIUM 1 G/3ML
1 INJECTION, POWDER, FOR SOLUTION INTRAMUSCULAR; INTRAVENOUS EVERY 8 HOURS
Status: COMPLETED | OUTPATIENT
Start: 2023-02-12 | End: 2023-02-13

## 2023-02-12 RX ORDER — SODIUM CHLORIDE, SODIUM LACTATE, POTASSIUM CHLORIDE, CALCIUM CHLORIDE 600; 310; 30; 20 MG/100ML; MG/100ML; MG/100ML; MG/100ML
INJECTION, SOLUTION INTRAVENOUS CONTINUOUS PRN
Status: DISCONTINUED | OUTPATIENT
Start: 2023-02-12 | End: 2023-02-12

## 2023-02-12 RX ORDER — OXYCODONE HYDROCHLORIDE 5 MG/1
10 TABLET ORAL EVERY 4 HOURS PRN
Status: DISCONTINUED | OUTPATIENT
Start: 2023-02-12 | End: 2023-02-12 | Stop reason: HOSPADM

## 2023-02-12 RX ORDER — EPHEDRINE SULFATE 50 MG/ML
INJECTION, SOLUTION INTRAMUSCULAR; INTRAVENOUS; SUBCUTANEOUS PRN
Status: DISCONTINUED | OUTPATIENT
Start: 2023-02-12 | End: 2023-02-12

## 2023-02-12 RX ORDER — SODIUM CHLORIDE, SODIUM LACTATE, POTASSIUM CHLORIDE, CALCIUM CHLORIDE 600; 310; 30; 20 MG/100ML; MG/100ML; MG/100ML; MG/100ML
INJECTION, SOLUTION INTRAVENOUS CONTINUOUS
Status: DISCONTINUED | OUTPATIENT
Start: 2023-02-12 | End: 2023-02-12 | Stop reason: HOSPADM

## 2023-02-12 RX ORDER — NEOSTIGMINE METHYLSULFATE 1 MG/ML
VIAL (ML) INJECTION PRN
Status: DISCONTINUED | OUTPATIENT
Start: 2023-02-12 | End: 2023-02-12

## 2023-02-12 RX ORDER — BUPIVACAINE HYDROCHLORIDE 2.5 MG/ML
INJECTION, SOLUTION EPIDURAL; INFILTRATION; INTRACAUDAL PRN
Status: DISCONTINUED | OUTPATIENT
Start: 2023-02-12 | End: 2023-02-12 | Stop reason: HOSPADM

## 2023-02-12 RX ORDER — ONDANSETRON 2 MG/ML
4 INJECTION INTRAMUSCULAR; INTRAVENOUS EVERY 30 MIN PRN
Status: DISCONTINUED | OUTPATIENT
Start: 2023-02-12 | End: 2023-02-12 | Stop reason: HOSPADM

## 2023-02-12 RX ORDER — FENTANYL CITRATE 50 UG/ML
25 INJECTION, SOLUTION INTRAMUSCULAR; INTRAVENOUS EVERY 5 MIN PRN
Status: DISCONTINUED | OUTPATIENT
Start: 2023-02-12 | End: 2023-02-12 | Stop reason: HOSPADM

## 2023-02-12 RX ORDER — PROPOFOL 10 MG/ML
INJECTION, EMULSION INTRAVENOUS PRN
Status: DISCONTINUED | OUTPATIENT
Start: 2023-02-12 | End: 2023-02-12

## 2023-02-12 RX ORDER — MAGNESIUM SULFATE HEPTAHYDRATE 40 MG/ML
2 INJECTION, SOLUTION INTRAVENOUS
Status: DISCONTINUED | OUTPATIENT
Start: 2023-02-12 | End: 2023-02-12 | Stop reason: HOSPADM

## 2023-02-12 RX ORDER — GLYCOPYRROLATE 0.2 MG/ML
INJECTION, SOLUTION INTRAMUSCULAR; INTRAVENOUS PRN
Status: DISCONTINUED | OUTPATIENT
Start: 2023-02-12 | End: 2023-02-12

## 2023-02-12 RX ORDER — METHADONE HYDROCHLORIDE 10 MG/ML
2 INJECTION, SOLUTION INTRAMUSCULAR; INTRAVENOUS; SUBCUTANEOUS 3 TIMES DAILY PRN
Status: DISCONTINUED | OUTPATIENT
Start: 2023-02-12 | End: 2023-02-12 | Stop reason: HOSPADM

## 2023-02-12 RX ORDER — LIDOCAINE HYDROCHLORIDE 40 MG/ML
SOLUTION TOPICAL PRN
Status: DISCONTINUED | OUTPATIENT
Start: 2023-02-12 | End: 2023-02-12

## 2023-02-12 RX ORDER — ONDANSETRON 2 MG/ML
INJECTION INTRAMUSCULAR; INTRAVENOUS PRN
Status: DISCONTINUED | OUTPATIENT
Start: 2023-02-12 | End: 2023-02-12

## 2023-02-12 RX ORDER — ALBUTEROL SULFATE 0.83 MG/ML
2.5 SOLUTION RESPIRATORY (INHALATION) EVERY 4 HOURS PRN
Status: DISCONTINUED | OUTPATIENT
Start: 2023-02-12 | End: 2023-02-12 | Stop reason: HOSPADM

## 2023-02-12 RX ORDER — OXYCODONE HYDROCHLORIDE 5 MG/1
5 TABLET ORAL EVERY 4 HOURS PRN
Status: DISCONTINUED | OUTPATIENT
Start: 2023-02-12 | End: 2023-02-12 | Stop reason: HOSPADM

## 2023-02-12 RX ORDER — CEFAZOLIN SODIUM 1 G/3ML
INJECTION, POWDER, FOR SOLUTION INTRAMUSCULAR; INTRAVENOUS PRN
Status: DISCONTINUED | OUTPATIENT
Start: 2023-02-12 | End: 2023-02-12

## 2023-02-12 RX ORDER — LABETALOL HYDROCHLORIDE 5 MG/ML
10 INJECTION, SOLUTION INTRAVENOUS
Status: DISCONTINUED | OUTPATIENT
Start: 2023-02-12 | End: 2023-02-12 | Stop reason: HOSPADM

## 2023-02-12 RX ADMIN — ACETAMINOPHEN 975 MG: 325 TABLET ORAL at 06:47

## 2023-02-12 RX ADMIN — CEFAZOLIN 1 G: 1 INJECTION, POWDER, FOR SOLUTION INTRAMUSCULAR; INTRAVENOUS at 20:02

## 2023-02-12 RX ADMIN — OXYCODONE HYDROCHLORIDE 5 MG: 5 TABLET ORAL at 01:41

## 2023-02-12 RX ADMIN — SODIUM CHLORIDE, POTASSIUM CHLORIDE, SODIUM LACTATE AND CALCIUM CHLORIDE: 600; 310; 30; 20 INJECTION, SOLUTION INTRAVENOUS at 06:47

## 2023-02-12 RX ADMIN — GLYCOPYRROLATE 0.6 MCG: 0.2 INJECTION, SOLUTION INTRAMUSCULAR; INTRAVENOUS at 13:37

## 2023-02-12 RX ADMIN — PROPOFOL 120 MG: 10 INJECTION, EMULSION INTRAVENOUS at 09:39

## 2023-02-12 RX ADMIN — Medication 5 MG: at 11:28

## 2023-02-12 RX ADMIN — Medication 10 MG: at 09:39

## 2023-02-12 RX ADMIN — ONDANSETRON 4 MG: 2 INJECTION INTRAMUSCULAR; INTRAVENOUS at 13:37

## 2023-02-12 RX ADMIN — SENNOSIDES AND DOCUSATE SODIUM 1 TABLET: 50; 8.6 TABLET ORAL at 20:03

## 2023-02-12 RX ADMIN — LORATADINE 10 MG: 10 TABLET ORAL at 20:03

## 2023-02-12 RX ADMIN — Medication 5 MG: at 10:45

## 2023-02-12 RX ADMIN — NEOSTIGMINE METHYLSULFATE 4.5 MG: 1 INJECTION, SOLUTION INTRAVENOUS at 13:37

## 2023-02-12 RX ADMIN — OXYCODONE HYDROCHLORIDE 5 MG: 5 TABLET ORAL at 20:03

## 2023-02-12 RX ADMIN — GUAIFENESIN 600 MG: 600 TABLET, EXTENDED RELEASE ORAL at 20:03

## 2023-02-12 RX ADMIN — DEXAMETHASONE SODIUM PHOSPHATE 8 MG: 4 INJECTION, SOLUTION INTRA-ARTICULAR; INTRALESIONAL; INTRAMUSCULAR; INTRAVENOUS; SOFT TISSUE at 09:39

## 2023-02-12 RX ADMIN — FAMOTIDINE 20 MG: 20 TABLET, FILM COATED ORAL at 08:20

## 2023-02-12 RX ADMIN — BUSPIRONE HYDROCHLORIDE 10 MG: 10 TABLET ORAL at 20:03

## 2023-02-12 RX ADMIN — FAMOTIDINE 20 MG: 20 TABLET, FILM COATED ORAL at 20:03

## 2023-02-12 RX ADMIN — HYDROXYZINE HYDROCHLORIDE 50 MG: 50 TABLET ORAL at 01:42

## 2023-02-12 RX ADMIN — CEFAZOLIN 2 G: 1 INJECTION, POWDER, FOR SOLUTION INTRAMUSCULAR; INTRAVENOUS at 09:37

## 2023-02-12 RX ADMIN — SODIUM CHLORIDE, POTASSIUM CHLORIDE, SODIUM LACTATE AND CALCIUM CHLORIDE: 600; 310; 30; 20 INJECTION, SOLUTION INTRAVENOUS at 11:02

## 2023-02-12 RX ADMIN — ROCURONIUM BROMIDE 50 MG: 50 INJECTION, SOLUTION INTRAVENOUS at 09:39

## 2023-02-12 RX ADMIN — GABAPENTIN 600 MG: 300 CAPSULE ORAL at 08:20

## 2023-02-12 RX ADMIN — LIDOCAINE HYDROCHLORIDE 50 MG: 10 INJECTION, SOLUTION EPIDURAL; INFILTRATION; INTRACAUDAL; PERINEURAL at 09:39

## 2023-02-12 RX ADMIN — Medication 5 MG: at 12:05

## 2023-02-12 RX ADMIN — LIDOCAINE HYDROCHLORIDE 4 ML: 40 SOLUTION TOPICAL at 09:42

## 2023-02-12 RX ADMIN — Medication 5 MG: at 10:50

## 2023-02-12 RX ADMIN — ROCURONIUM BROMIDE 10 MG: 50 INJECTION, SOLUTION INTRAVENOUS at 10:43

## 2023-02-12 RX ADMIN — SODIUM CHLORIDE, POTASSIUM CHLORIDE, SODIUM LACTATE AND CALCIUM CHLORIDE: 600; 310; 30; 20 INJECTION, SOLUTION INTRAVENOUS at 09:30

## 2023-02-12 RX ADMIN — CEFAZOLIN 2 G: 1 INJECTION, POWDER, FOR SOLUTION INTRAMUSCULAR; INTRAVENOUS at 12:04

## 2023-02-12 RX ADMIN — GABAPENTIN 600 MG: 300 CAPSULE ORAL at 20:03

## 2023-02-12 RX ADMIN — KETOROLAC TROMETHAMINE 15 MG: 30 INJECTION, SOLUTION INTRAMUSCULAR at 12:28

## 2023-02-12 RX ADMIN — DEXMEDETOMIDINE HYDROCHLORIDE 0.5 MCG/KG/HR: 100 INJECTION, SOLUTION INTRAVENOUS at 09:55

## 2023-02-12 RX ADMIN — Medication 5 MG: at 11:31

## 2023-02-12 ASSESSMENT — ACTIVITIES OF DAILY LIVING (ADL)
ADLS_ACUITY_SCORE: 22
ADLS_ACUITY_SCORE: 24
ADLS_ACUITY_SCORE: 22
ADLS_ACUITY_SCORE: 24
ADLS_ACUITY_SCORE: 22

## 2023-02-12 ASSESSMENT — COPD QUESTIONNAIRES
COPD: 1
CAT_SEVERITY: MILD

## 2023-02-12 NOTE — OP NOTE
Date of Service: February 12, 2023    Pre-Operative Diagnosis:  1. Right comminuted proximal olecranon fracture.  2. Left intra-articular distal radius fracture, greater than 3-part with associated left distal radius malunion.  3. Left distal ulnar shaft fracture.  4. Rheumatoid arthritis.  5. Osteoporosis.    Post-Operative Diagnosis:  1. Right comminuted proximal olecranon fracture.  2. Status post previous right distal ulna resection ([place])  3. Left intra-articular distal radius fracture, greater than 3-part with associated left distal radius malunion.  4. Left distal ulnar shaft fracture.  5. Rheumatoid arthritis.  6. Osteoporosis.    Procedure:   1. Open reduction and internal fixation right proximal olecranon fracture with excision of loose bone fragments from the joint.  2. Open reduction and dorsal bridge plating left intra-articular distal radius fracture, greater than 3-part.  3. Closed treatment left distal ulnar shaft fracture.    Attending Surgeon: Pili Brock MD    Assistant: Siria Carey PA-C    Anesthesia: General plus local consisting of 30 ml 0.25% Marcaine plain split between the two operative extremities.    Estimated Blood Loss: 20 ml (10 ml from each side).    Tourniquet Time: 63 minutes on right; 69 minutes on left.    Specimens: None.    Drains: None.    Implants: The Acumed standard 5-hole proximal olecranon plate was utilized with locking and non-locking screws.    For the distal radius on the left a short Acumed bridge plate was utilized with locking and non-locking screws.    Complications: None apparent.    Brief History: The patient is an 82-year-old right-hand-dominant female who sustained a mechanical fall while walking her dog on February 11, 2023.  She was brought to [facility] emergency department via ambulance due to bilateral upper extremity injuries.  Subsequent evaluation revealed a displaced right olecranon fracture and a comminuted, intra-articular distal radius  fracture with underlying malunion and rheumatologic changes.  I have reviewed the radiographs and treatment options with the patient and her daughter.  I have recommended surgical intervention in the form of open reduction and internal fixation of the right olecranon fracture and open reduction and dorsal bridge plating of the left comminuted distal radius fracture.  I have described the procedure, postoperative protocol, and the expected outcomes.  I have also discussed the risks that include bleeding, infection, nerve or vessel damage, wound healing problems, malunion or nonunion, wrist, forearm, or elbow stiffness, implant failure or prominence, persistent pain, the possibility that she could require further surgery.  Anesthetic risks are rare, but include breathing problems, heart problems, stroke, and death.  After a full discussion of the risks, benefits, and alternatives to surgery the patient has elected to proceed, and informed consent was obtained.    Intraoperative Findings: There was significant bone loss at the proximal olecranon with lateral sided bone loss.  The articular surface had a small loose fragment, which was discarded.  Following fixation of the olecranon, she was noted to have a click with forearm rotation.  Subsequent imaging of the wrist revealed that she is status post distal ulna resection, with an unstable distal ulna and forearm rotation.  Inspection of the radiocarpal joint demonstrated severe synovitis and articular destruction.  It was noted at this point that there was a previous malunion.  An anatomic reduction of the joint surface was not obtainable.  The fragments were aligned, and the radial styloid was temporarily pinned.  The dorsal bridge plate was then applied with slight traction to align the fragments.    Description of Procedure: The patient was identified in the preoperative holding area.  Her consent was reviewed and signed, and her operative sites were identified and  marked.  Her history and physical was reviewed.  She was brought to the operating room, and transferred to the operating table in a supine position.  She underwent induction with general anesthesia.  She was then placed in the left lateral decubitus position with the use of a beanbag.  All bony prominences were well-padded.  An axillary roll was placed.  The right arm was placed on bone foam, assuring protection to the injured left forearm.  Her splint was removed, and a prescrub was performed.  The right arm was prepped and draped in a standard, sterile fashion.  A timeout was performed verifying the correct patient, procedure, site, and side.  Preoperative, prophylactic antibiotics were administered.  An Esmarch was used to exsanguinate the limb, and the tourniquet was inflated to 250 mmHg.  A longitudinal dorsal incision was created along the proximal ulnar bone ridge, with a curvilinear portion around the lateral aspect of the tip of the olecranon.  A full-thickness skin flap was elevated deep to the subcutaneous tissue.  The fracture site was identified and freed of fibrinous debris and hematoma.  The proximal fragment was mobilized.  There was central comminution of the articular surface, and small fragments were excised and discarded.  A curette was used to debride the bone surfaces of fibrous tissue.  A longitudinal incision was created directly on the center portion of the proximal ulna, and the muscle was elevated both radial and ulnar to expose the ulnar shaft.  The fracture was reduced, and an anatomic reduction was achieved as visualized through the open lateral window.  There was a small articular defect, but no step-off.  The fracture was temporarily pinned with 2 antegrade 0.062 inch K wires.  Fluoroscopy was utilized to confirm the reduction.  A standard, 5 hole Acumed olecranon plate was applied to the dorsal surface, and temporarily pinned in place.  Again, fluoroscopy confirmed appropriate plate  position.  Four locking screws were placed into the proximal portion of the plate, followed by a nonlocking screw in the oblong hole on the ulnar shaft.  This achieved compression at the fracture site.  2 additional nonlocking screws were placed into the distal ulnar shaft followed by a locking screw at the level of the coronoid.  A fully threaded locking screw was placed for the homerun screw.  Final images were obtained revealing anatomic alignment of the fracture with restoration of the articular surface.  These images were saved.  The elbow was brought through full flexion extension and forearm rotation.  At this point a clunk was noted, initially thought to be secondary to a prominent screw at the proximal radial ulnar joint.  However, subsequent imaging of the wrist revealed a previous distal ulnar resection, leading to an unstable distal ulna and forearm rotation.  The screws were confirmed to be of appropriate length.  The proximal wound was thoroughly irrigated with normal saline.  The fascia was closed with interrupted 0 Vicryl.  The tourniquet was deflated and hemostasis was achieved.  The skin was closed with buried 3-0 Monocryl followed by running, subcuticular 3-0 Monocryl.  A total of 15 cc 0.25% Marcaine plain was injected in the skin and subcutaneous tissues around the incision.  Soft, sterile dressings were applied followed by a well-padded long-arm splint.  The patient tolerated this portion of the procedure well, and there were no immediate complications.    The patient was then repositioned on the operative table into a supine position.  Again, all bony prominences were well padded, and the right arm was secured at her side.  The left arm was placed onto an arm table, and a tourniquet was applied to the left upper arm.  Her splint was removed, and a free scrub was performed.  The left arm was prepped and draped in a standard, sterile fashion.  A second timeout was performed verifying the correct  patient, procedure, site, and side.  An Esmarch was used to exsanguinate the limb, and the tourniquet was inflated to 250 mmHg.  A dorsal, longitudinal incision was made in line with the long finger, just ulnar to Raghav's tubercle at the level of the radiocarpal joint.  This was taken through skin and subcutaneous tissue, and full-thickness skin flaps were elevated radial and ulnar.  Hemostasis was achieved.  The extensor pollicis longus was identified distally, and the extensor retinaculum was incised in line with the extensor pollicis longus.  The tendons were gently retracted and the dorsal capsule was identified, and incised in line with the fracture fragments.  There was significant comminution.  Her bone was extremely osteoporotic.  Intra-articular evaluation revealed significant synovitis and articular cartilage loss from her rheumatoid disease.  Fracture fragments were loosely approximated, and the radial styloid was reduced.  A small incision was made just distal to the tip of the radial styloid, and a temporary, retrograde K wire was inserted to maintain the reduction.  A longitudinal incision was made over the third metacarpal.  Blunt dissection was performed and the extensor tendon was gently retracted.  A longitudinal incision was made into the periosteum, and the bone was further exposed.  A short Acumed dorsal bridge plate was chosen, and this was inserted from the distal incision across the radiocarpal joint to the radial shaft.  The plate was confirmed to be volar to the extensor tendons at the level of the extensor retinaculum.  The plate was reduced to the third metacarpal and radial shaft and temporarily pinned in place.  Fluoroscopy was utilized to confirm plate position and fracture alignment.  Four nonlocking screws were placed into the third metacarpal.  Fluoroscopy was utilized to fabian incisions for the proximal shaft screws.  Blunt dissection was performed down to the shaft.  Traction was  applied for further reduction of the distal radius, and 3 nonlocking screws were placed into the proximal radial shaft.  Final images were obtained revealing reasonable reduction of the distal radius fracture with appropriate plate and screw position.  The temporary K wire was removed.  The distal ulna was evaluated, and noted to be stable.  Therefore, closed treatment was chosen.  The wounds were thoroughly irrigated with normal saline.  The extensor pollicis longus was left radialized.  The incisions were closed with buried 4-0 Monocryl followed by running, subcuticular 4-0 Monocryl.  Benzoin and Steri-Strips were applied.  A total of 15 cc 0.25% Marcaine plain was injected in the skin and subcutaneous tissues around the incisions.  Soft, sterile dressings were applied followed by a well-padded volar resting splint.  The patient tolerated the procedure well, there were no immediate complications.  She was awakened, and brought to the recovery room in stable condition.  All sponge and needle counts were correct at the end of the case.    Post-Operative Plan: The patient will return to the inpatient floor to begin therapy.  We will continue with 24 hours of IV antibiotics.  She may use her forearms for ambulation with a walker.  She may discharge to home when she has cleared therapy and has adequate pain control.  I will plan to see her in my clinic in 1 week for splint removal and further advancement of her rehabilitation.      Pili Brock MD

## 2023-02-12 NOTE — PLAN OF CARE
"[id number]    Inpatient Progress Note: Fall/ Wrist and Elbow fractures    /72 (BP Location: Right leg)   Pulse 79   Temp 98.6  F (37  C) (Oral)   Resp 16   Ht 1.664 m (5' 5.5\")   SpO2 93%   BMI 33.66 kg/m         Orientation: Alert and oriented x4  Neuro: Intact  Pain status: Managed with Atarax and Oxycodone.   Activity: Need extensive assistance.   Peripheral edema:   Resp: on continous pule oximetry. CPAP on overnight.  Cardiac: WNL    Skin: CMS: Circulation and sensation intact. Pain with movement  LDA:   Infusions:   Pertinent Labs: Planning ORIF right olecranon and left distal radius with likely dorsal bridge plate  Diet: NPO after midnight  Consults: PT/ SW  Discharge Plan:     Will continue to monitor and provide cares.     Julienne Schmitt RN        "

## 2023-02-12 NOTE — ANESTHESIA CARE TRANSFER NOTE
Patient: Ginger Marshall    Procedure: Procedure(s):  OPEN REDUCTION INTERNAL FIXATION, LEFT DISTAL RADIUS FRACTURE  OPEN REDUCTION INTERNAL FIXATION, RIGHT OLECRANON FRACTURE       Diagnosis: Radius fracture [S52.90XA]  Diagnosis Additional Information: No value filed.    Anesthesia Type:   General     Note:    Oropharynx: oropharynx clear of all foreign objects  Level of Consciousness: awake  Oxygen Supplementation: face mask  Level of Supplemental Oxygen (L/min / FiO2): 6  Independent Airway: airway patency satisfactory and stable  Dentition: dentition unchanged  Vital Signs Stable: post-procedure vital signs reviewed and stable  Report to RN Given: handoff report given  Patient transferred to: PACU    Handoff Report: Identifed the Patient, Identified the Reponsible Provider, Reviewed the pertinent medical history, Discussed the surgical course, Reviewed Intra-OP anesthesia mangement and issues during anesthesia, Set expectations for post-procedure period and Allowed opportunity for questions and acknowledgement of understanding      Vitals:  Vitals Value Taken Time   /57 02/12/23 1415   Temp 37    Pulse 73 02/12/23 1415   Resp 11 02/12/23 1415   SpO2 100 % 02/12/23 1415   Vitals shown include unvalidated device data.    Electronically Signed By: AKIRA Resendiz CRNA  February 12, 2023  2:17 PM

## 2023-02-12 NOTE — ANESTHESIA POSTPROCEDURE EVALUATION
Patient: Ginger Marshall    Procedure: Procedure(s):  OPEN REDUCTION INTERNAL FIXATION, LEFT DISTAL RADIUS FRACTURE  OPEN REDUCTION INTERNAL FIXATION, RIGHT OLECRANON FRACTURE       Anesthesia Type:  General    Note:  Disposition: Inpatient   Postop Pain Control: Uneventful            Sign Out: Well controlled pain   PONV: No   Neuro/Psych: Uneventful            Sign Out: Acceptable/Baseline neuro status   Airway/Respiratory: Uneventful            Sign Out: Acceptable/Baseline resp. status   CV/Hemodynamics: Uneventful            Sign Out: Acceptable CV status   Other NRE: NONE   DID A NON-ROUTINE EVENT OCCUR? No           Last vitals:  Vitals Value Taken Time   /53 02/12/23 1500   Temp 96.7  F (35.9  C) 02/12/23 1508   Pulse 78 02/12/23 1522   Resp 24 02/12/23 1522   SpO2 98 % 02/12/23 1522   Vitals shown include unvalidated device data.    Electronically Signed By: Cristo Morales MD  February 12, 2023  3:40 PM

## 2023-02-12 NOTE — BRIEF OP NOTE
[facility]    Brief Operative Note    Pre-operative diagnosis: Right comminuted olecranon fracture      Right comminuted intra-articular distal radius fracture  Post-operative diagnosis Same as pre-operative diagnosis    Procedure: Procedure(s):  OPEN REDUCTION INTERNAL FIXATION, LEFT DISTAL RADIUS FRACTURE  OPEN REDUCTION INTERNAL FIXATION, RIGHT OLECRANON FRACTURE with dorsal bridge plate.  Surgeon: Surgeon(s) and Role:     * Pili Brock MD - Primary     * Siria Carey PA-C - Assisting  Anesthesia: General plus local    Estimated Blood Loss: 20 cc    Drains: None  Specimens: None.  Findings:   None.  Complications: None.  Implants:   Implant Name Type Inv. Item Serial No.  Lot No. LRB No. Used Action   OLECRANON PLATE STATNDARD 5-HOLE, R    ACUMED [id number] 2023 Right 1 Implanted   SCREW LOCKING HEXALOBE 2.7MM X 18MM - [id number] [employer]allic [employer]ware/[place] SCREW LOCKING HEXALOBE 2.7MM X 18MM  [employer] [id number] 2023 Right 3 Implanted   2.7MM X 20MM LOCKING SCREW    ACUMED [id number] 2023 Right 1 Implanted   SCREW NON-LOCKING HEXALOBE 3.5MM X 14MM - [id number] Metallic Hardware/[place] SCREW NON-LOCKING HEXALOBE 3.5MM X 14MM  [employer] [id number] 2023 Right 1 Implanted   SCREW NON-LOCKING HEXALOBE 3.5MM X 16MM - [id number] Metallic Hardware/[place] SCREW NON-LOCKING HEXALOBE 3.5MM X 16MM  [employer] [id number] [id number] 2023 Right 1 Implanted   3.5MM X 18MM NON-LOCKING SCREWS    ACUMED [id number] 2023 Right 1 Implanted   3.5MM X 34MM LOCKING SCREWS    ACUMED [id number] [id number] 2023 Right 1 Implanted   3.0MM X 50MM LOCKING SCREWS    ACUMED [id number] [id number] 2023 Right 1 Implanted       Elevate  Ice  Abx x 24 hours  Able to use left arm to flex to mouth for ADLs and may weight bear through forearms for ambulation as needed.  Ok to discharge when cleared by OT/PT.  Will likely need TCU placement.    Pili Brock MD

## 2023-02-12 NOTE — PROGRESS NOTES
[facility]    Medicine Progress Note - Hospitalist Service    Date of Admission:  2/11/2023    Assessment & Plan      Gniger Marshall is a 82 year old female admitted on 2/11 with PMH history of right rib fracture, depression, anxiety, GERD, rheumatoid arthritis, osteoporosis who presents after mechanical fall this morning while walking her dog resulting in complex right elbow fracture and complex left distal radial and ulnar fractures.  Has a abrasion and bruising to the nasal bridge.  Chipped her front incisor however it is a bridge.  Denies any surgical complications or issues with anesthesia in the past.  Denies any head pain, chest pain, shortness of breath, abdominal pain, nausea/vomiting.  Denies any pain in the lower extremities. CT facial bones shows no acute displaced maxillofacial fracture, possible soft tissue swelling overlying the nasal arch and anterior nasal septum.      Complex right elbow fracture due to fall  Complex left distal radial and ulnar fracture   POD# 0 ORIF left distal radius and right olecranon fracture  - consult ortho  - surgery 2/12  - EBL 20 ml, no surgical complications noted  - Pain regimen: scheduled tylenol, ice, elevate, oxycodone PRN, IV Dilaudid PRN, schedule gabapentin, atarax PRN  -Consult PT/OT and social work help with discharge planning  -Suspect will likely need TCU  - Discontinue fluids once tolerating oral intake    Rheumatoid arthritis  -PTA methotrexate once weekly on Thursday and resume updacitinib tomorrow     Osteoporosis   - Zoledronic acid next dose due March 2023    Hx Closed right rib fx   - noted on XR on 1/8/23, healing well    Left thyroid nodule  - follow up with outpatient thyroid ultrasound       Diet: Advance Diet as Tolerated: Regular Diet Adult    DVT Prophylaxis: Pneumatic Compression Devices  Nichols Catheter: Not present  Lines: None     Cardiac Monitoring: None  Code Status: No CPR- Do NOT Intubate      Clinically Significant  "Risk Factors                         # Obesity: Estimated body mass index is 33.66 kg/m  as calculated from the following:    Height as of this encounter: 1.664 m (5' 5.5\").    Weight as of 1/16/23: 93.2 kg (205 lb 6.4 oz)., PRESENT ON ADMISSION       Disposition Plan     Expected Discharge Date: 02/13/2023                The patient's care was discussed with the Patient.    IRAIDA Maciel PA-C  Hospitalist Service  [facility]  Securely message with [employer] (more info)  Text page via [facility] Paging/Directory   ______________________________________________________________________    Interval History   Doing well after surgery. No concerns. Slight pain in the left arm if moves it. Denies chest pain, SOB, abdominal pain, N/V. Has not urinate yet.    Physical Exam   Vital Signs: Temp: (!) 96.7  F (35.9  C) Temp src: Temporal BP: 113/53 Pulse: 63   Resp: 12 SpO2: 100 % O2 Device: Nasal cannula Oxygen Delivery: 2 LPM  Weight: 0 lbs 0 oz    GENERAL:  Alert, Comfortable, No acute distress. Laying in bed.   PSYCH: pleasant, oriented.  HEENT:  Normocephalic, No scleral icterus or conjunctival injection  HEART:  Normal S1, S2 with no murmur, RRR  LUNGS:  Normal Respiratory effort. Clear to auscultation bilaterally with no wheezing, rales or ronchi.  ABDOMEN:  Soft, non-tender, non distended. No peritoneal signs.   EXTREMITIES: Upper extremities splinted, CMS intact, No pedal edema,  No cyanosis.   SKIN:  Warm, dry to touch.  NEUROLOGIC: Speech clear, alert & orientated x 4, no focal deficits.     Medical Decision Making       MANAGEMENT DISCUSSED with the following over the past 24 hours: nurse, patient   NOTE(S)/MEDICAL RECORDS REVIEWED over the past 24 hours: labs, imaging, progress notes      Data         Imaging results reviewed over the past 24 hrs:   Recent Results (from the past 24 hour(s))   XR Surgery NIESHA L/T 5 Min Fluoro w Stills    Narrative    This exam was marked as non-reportable because it " will not be read by a   radiologist or a [place] non-radiologist provider.         XR Surgery NIESHA L/T 5 Min Fluoro w Stills    Narrative    This exam was marked as non-reportable because it will not be read by a   radiologist or a [place] non-radiologist provider.

## 2023-02-12 NOTE — CONSULTS
Date of Service: Feb 11, 2023    Chief Complaint: Fall    History of Present Illness: Ginger Marshall is an 82 year old, right handed female who presented to the Emergency Department on 2/11/2023 via ambulance after a mechanical fall while walking her dog.  She had immediate pain and deformity of her bilateral upper extremities.  She was evaluated in the ED and then admitted due to the bilateral nature of her injuries.  She lives alone, but has a daughter present with her at bedside.  She does report a history of a previous left distal radius fracture (20+ years ago) treated with pins and a cast.  She notes that after it healed, that wrist looked different.    Currently, she has significant pain in her left wrist, but minimal pain on the right elbow.  She denies numbness or tingling in either upper extremity.  She denies loss of consciousness at the time of her fall.  She did have a CT scan of her facial bones, head, and cervical spine in the ED.  This did not show evidence for fracture or bleed.    Review of Systems: A 14-point review of systems was obtained on the intake form and is notable for rheumatoid arthritis and those stated above.    Past Medical History:  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     Menopause 1989    late 40's     Migraine 04/27/2014    resolved     Multinodular goiter      Other chronic pain     joints     Pelvic fracture (H) 05/13/2014     Rheumatic mitral stenosis       Rheumatoid arteritis (H)      S/P lumbar fusion 2017     Sleep apnea      Vitamin B12 deficiency        Past Surgical History:  Past Surgical History:   Procedure Laterality Date     ABDOMEN SURGERY      c sections     ARTHROSCOPY KNEE Left      ARTHROSCOPY KNEE RT/LT  2006    left     BACK SURGERY  2013    disc     BIOPSY BREAST       BREAST SURGERY  1995    lumpectomy 's     BREAST SURGERY      lumpectomy     CATARACT EXTRACTION Bilateral      CATARACT IOL, RT/LT Bilateral 2010 aproximately      SECTION  1966      SECTION  1972     COLONOSCOPY  2009,     COLONOSCOPY       FINGER SURGERY Right 2019    Procedure: DISTAL ULNA RESECTION, RIGHT MIDDLE SILASTIC METACARPALPHALANGEAL EXCHANGE AND RIGHT ELBOW NODULE EXCISION.;  Surgeon: Hilario Redmond MD;  Location: [facility];  Service: Orthopedics     FOOT SURGERY Left      GYN SURGERY  66,72     HAND SURGERY Right      HAND SURGERY Right      HC ESOPH/GAS REFLUX TEST W NASAL IMPED >1 HR  2012    Procedure:ESOPHAGEAL IMPEDENCE FUNCTION TEST WITH 24 HOUR PH GREATER THAN 1 HOUR; Surgeon:KAYKAY JULIO; Location:[facility]     IR LUMBAR EPIDURAL STEROID INJECTION       IR TRANSLAMINAR EPIDURAL LUMBAR INJ INCL IMAGING  2012    LESI L5-S1 at [facility]     LUMBAR FUSION       OPTICAL TRACKING SYSTEM FUSION POSTERIOR SPINE LUMBAR N/A 2017    Procedure: OPTICAL TRACKING SYSTEM FUSION SPINE POSTERIOR LUMBAR ONE LEVEL;  Surgeon: Anthony Michele MD;  Location:  OR     ORTHOPEDIC SURGERY      left foot surgery     ORTHOPEDIC SURGERY      R MCP surgery     ORTHOPEDIC SURGERY      right knee total replacement     TOTAL HIP ARTHROPLASTY Right      TOTAL KNEE ARTHROPLASTY Left      TOTAL KNEE ARTHROPLASTY Right      ZZC ANESTH,TOTAL HIP ARTHROPLASTY      Rt hip     ZZC HAND/FINGER SURGERY UNLISTED  2005    right hand     ZZC TOTAL KNEE ARTHROPLASTY  2006    left         MEDICATIONS:    Current Facility-Administered Medications:      acetaminophen (TYLENOL) tablet 975 mg, 975 mg, Oral, Q8H, Gabriella Maciel PA-C, 975 mg at 02/12/23 0647     albuterol (PROVENTIL HFA/VENTOLIN HFA) inhaler, 2 puff, Inhalation, Q6H PRN, Gabriella Maciel PA-C     artificial tears (GENTEAL) 0.1-0.2-0.3 % ophthalmic solution 1 drop, 1 drop, Both Eyes, BID PRN, Gabriella Maciel PA-C     atorvastatin (LIPITOR) tablet 40 mg, 40 mg, Oral, Daily, Gabriella Maciel PA-C     busPIRone (BUSPAR) tablet 10 mg, 10 mg, Oral, BID, Gabriella Maciel PA-C, 10 mg at 02/11/23 2006     desvenlafaxine (PRISTIQ) 24 hr tablet 100 mg, 100 mg, Oral, Daily, Gabriella Maciel PA-C     famotidine (PEPCID) tablet 20 mg, 20 mg, Oral, BID, Gabriella Maciel PA-C, 20 mg at 02/12/23 0820     gabapentin (NEURONTIN) capsule 600 mg, 600 mg, Oral, BID, Alicia, Diego A, DO, 600 mg at 02/12/23 0820     guaiFENesin (MUCINEX) 12 hr tablet 600 mg, 600 mg, Oral, BID, Gabriella Maciel PA-C, 600 mg at 02/11/23 2006     HYDROmorphone (DILAUDID) injection 0.2 mg, 0.2 mg, Intravenous, Q3H PRN, Gabriella Maciel PA-C, 0.2 mg at 02/11/23 1510     hydrOXYzine (ATARAX) tablet 25 mg, 25 mg, Oral, Q6H PRN, 25 mg at 02/11/23 2012 **OR** hydrOXYzine (ATARAX) tablet 50 mg, 50 mg, Oral, Q6H PRN, Gabriella Maciel PA-C, 50 mg at 02/12/23 0142     lactated ringers infusion, , Intravenous, Continuous, Gabriella Maciel PA-C, Last Rate: 75 mL/hr at 02/12/23 0647, New Bag at 02/12/23 0647     lidocaine (LMX4) cream, , Topical, Q1H PRN, Gabriella Maciel PA-C     lidocaine 1 % 0.1-1 mL, 0.1-1 mL, Other, Q1H PRN, Gabriella Maciel PA-C     loratadine (CLARITIN) tablet 10 mg, 10 mg, Oral, BID, Gabriella Maciel PA-C, 10 mg at 02/11/23 2006     melatonin tablet 1 mg, 1 mg, Oral, At Bedtime PRN, Gabriella Maciel PA-C     naloxone (NARCAN) injection 0.2 mg, 0.2 mg, Intravenous, Q2 Min PRN **OR** naloxone (NARCAN) injection 0.4 mg, 0.4 mg, Intravenous, Q2 Min PRN **OR** naloxone (NARCAN)  "injection 0.2 mg, 0.2 mg, Intramuscular, Q2 Min PRN **OR** naloxone (NARCAN) injection 0.4 mg, 0.4 mg, Intramuscular, Q2 Min PRN, Diego Vargas DO     ondansetron (ZOFRAN ODT) ODT tab 4 mg, 4 mg, Oral, Q6H PRN **OR** ondansetron (ZOFRAN) injection 4 mg, 4 mg, Intravenous, Q6H PRN, Gabriella Maciel PA-C     oxyCODONE IR (ROXICODONE) half-tab 2.5-5 mg, 2.5-5 mg, Oral, Q4H PRN, Gabriella Maciel PA-C, 5 mg at 02/12/23 0141     prochlorperazine (COMPAZINE) injection 5 mg, 5 mg, Intravenous, Q6H PRN **OR** prochlorperazine (COMPAZINE) tablet 5 mg, 5 mg, Oral, Q6H PRN **OR** prochlorperazine (COMPAZINE) suppository 12.5 mg, 12.5 mg, Rectal, Q12H PRN, Gabriella Maciel PA-C     senna-docusate (SENOKOT-S/PERICOLACE) 8.6-50 MG per tablet 1 tablet, 1 tablet, Oral, BID, 1 tablet at 02/11/23 2006 **OR** senna-docusate (SENOKOT-S/PERICOLACE) 8.6-50 MG per tablet 2 tablet, 2 tablet, Oral, BID, Gabriella Maciel PA-C     sodium chloride (PF) 0.9% PF flush 3 mL, 3 mL, Intracatheter, Q8H, Gabriella Maciel PA-C, 3 mL at 02/11/23 1510     sodium chloride (PF) 0.9% PF flush 3 mL, 3 mL, Intracatheter, q1 min prn, Gabriella Maciel PA-C     [Held by provider] Upadacitinib ER TB24 15 mg, 15 mg, Oral, Daily, Gabriella Maciel PA-C    ALLERGIES:  Allergies   Allergen Reactions     Abatacept Other (See Comments)     Severe headaches  Migraine     Celebrex [Celecoxib]      Ineffective     Celecoxib Unknown and Nausea     Ethanol      Antihistamines     Levaquin [Levofloxacin] Fatigue     Severe body pain     Orencia [Abatacept]      Headache     Septra [Bactrim]      Sulfa Drugs Nausea and Vomiting     \"deathly ill\"  Allergic to everything with Sulfa in it.     Sulfamethoxazole-Trimethoprim Nausea and Nausea and Vomiting     Tramadol Other (See Comments)     Headache  Migraine headache     Valdecoxib Unknown and Nausea     Made me \"very very ill\", might of been \"cramping\"     Adhesive Tape Rash     Plastic tape  Plastic tapes     Antihistamines, " "Chlorpheniramine-Type  [Alkylamines] Anxiety and Other (See Comments)     Social History:  Patient lives alone.  Retired.  Negative tobacco use.  Negative alcohol use.  History of remote tobacco use.    Family History:  Negative for bleeding or clotting disorders or adverse reactions to anesthesia.    Physical examination:  VITALS: BP (!) 176/86 (BP Location: Right leg)   Pulse 76   Temp 98.6  F (37  C) (Oral)   Resp 16   Ht 1.664 m (5' 5.5\")   SpO2 96%   BMI 33.66 kg/m    Pain is rated 7 out of 10 on the visual analog scale.  GENERAL: Healthy-appearing adult female in mild acute distress.  Alert and oriented times three.  HEENT: Head normocephalic.  Abrasions on nasal bridge.  Extra-ocular movements intact.  Neck: Full range of motion without pain.  Respiratory: Breathing regular and non-labored.  Bilateral upper extremity: Bilateral upper extremity long arm splints in place.   5/5 finger abduction, EPL, FPL.  Sensation intact to light touch in all fingers.  Fingers are warm and well-perfused.  Skin: Visualized areas show skin is intact.  Splints obscure complete evaluation.    Radiographs: Three views of the left wrist were obtained on admission and independently reviewed.  These demonstrate an intra-articular distal radius fracture with dorsal angulation and a large dorsal fragment.  There is evidence a of a malunion on the PA view with loss of radial inclination and shortening.  Post-reduction views show improvement in alignment but persistent articular incongruity with apex volar angulation.  Three views of the right elbow demonstrate a displaced olecranon fracture with proximal distraction of the tip of the olecranon.  CT scans were obtained of both the left wrist and right elbow.    Assessment: 82 year old, right handed female with right olecranon fracture and comminuted intra-articular left distal radius fracture through a previous distal radius malunion.    Plan: I discussed non-operative and " operative treatment options with the patient and her daughter.  I have recommended surgical intervention for both the wrist and the elbow.  Due to the comminuted nature of the left wrist, osteoporosis, and previous malunion, I discussed using a dorsal bridge plate for stabilization.  Given her bilateral injuries, I think she would be best served with more stability on the left to help with mobilization.  I am concerned that a volar plate will not provide adequate fixation without concern for collapse.  Additionally, the volar plates may not conform to her previous malunion.  Therefore, I have discussed open reduction and internal fixation of the right olecranon and open reduction and internal fixation of left wrist with a dorsal bridge plate.  I have described the procedure, post-operative protocol, and the expected outcomes.  I also discussed the risks of surgery which include, but are not limited to, bleeding, infection, nerve or vessel damage, wound healing problems, persistent pain, malunion or nonunion, implant failure or prominence, elbow, wrist or finger stiffness, and the possibility for further surgery.  She understands that she may elect to have her dorsal bridge plate removed at some point if she elects to regain range of motion.  Anesthetic risks are rare, but include breathing problems, heart problems, stroke and death.  After a full discussion of risks, benefits, and alternatives to surgery, the patient has elected to proceed.  We will proceed on Sunday.  She has been cleared by medicine.  We will plan for NPO at midnight.  She will hold Rinvoq on Sunday.  Plan discussed with [facility].  The patient and daughter understand that she will likely require a TCU stay following her stay in the hospital due to the limitations of the bilateral injuries.    Pili Brock MD  [facility]

## 2023-02-12 NOTE — ANESTHESIA PROCEDURE NOTES
Airway       Patient location during procedure: OR       Procedure Start/Stop Times: 2/12/2023 9:42 AM  Staff -        CRNA: Nico Palacio APRN CRNA       Performed By: CRNA  Consent for Airway        Urgency: elective  Indications and Patient Condition       Indications for airway management: destin-procedural       Induction type:intravenous       Mask difficulty assessment: 2 - vent by mask + OA or adjuvant +/- NMBA    Final Airway Details       Final airway type: endotracheal airway       Successful airway: ETT - single  Endotracheal Airway Details        ETT size (mm): 7.0       Cuffed: yes       Successful intubation technique: direct laryngoscopy       DL Blade Type: Mariscal 2       Grade View of Cords: 1       Adjucts: stylet       Position: Right       Measured from: lips       Secured at (cm): 21    Post intubation assessment        Placement verified by: capnometry, equal breath sounds and chest rise        Number of attempts at approach: 1       Number of other approaches attempted: 0       Secured with: plastic tape       Ease of procedure: easy       Dentition: Intact and Unchanged    Medication(s) Administered   Medication Administration Time: 2/12/2023 9:42 AM

## 2023-02-12 NOTE — ANESTHESIA PREPROCEDURE EVALUATION
Anesthesia Pre-Procedure Evaluation    Patient: Ginger Marshall   MRN: [id number] : 1940        Procedure : Procedure(s):  OPEN REDUCTION INTERNAL FIXATION, left distal radius fracture, right olecranon fracture          Past Medical History:   Diagnosis Date     Acute posthemorrhagic anemia 10/13/2012     Closed fracture of multiple ribs of left side, initial encounter 2019     COPD (chronic obstructive pulmonary disease) (H)     no longer occurring     Ex-smoker 1999     Gastroenteritis 2020    Gastroenteritis with norovirus     Herniated nucleus pulposus, L3-4 3/1/2017     Hip joint replacement by other means 07/10/2008     History of blood transfusion      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     History of total knee replacement 2009     Menopause 1989    late 40's     Migraine 2014    resolved     Multinodular goiter      Other chronic pain     joints     Pelvic fracture (H) 2014     Rheumatic mitral stenosis      Rheumatoid arteritis (H)      S/P lumbar fusion 2017     Sleep apnea      Vitamin B12 deficiency       Past Surgical History:   Procedure Laterality Date     ABDOMEN SURGERY      c sections     ARTHROSCOPY KNEE Left      ARTHROSCOPY KNEE RT/LT  2006    left     BACK SURGERY  2013    disc     BIOPSY BREAST       BREAST SURGERY      lumpectomy 's     BREAST SURGERY      lumpectomy     CATARACT EXTRACTION Bilateral      CATARACT IOL, RT/LT Bilateral 2010 aproximately      SECTION  1966      SECTION  1972     COLONOSCOPY  2009,     COLONOSCOPY       FINGER SURGERY Right 2019    Procedure: DISTAL ULNA RESECTION, RIGHT MIDDLE SILASTIC METACARPALPHALANGEAL EXCHANGE AND RIGHT ELBOW NODULE EXCISION.;  Surgeon: Hilario Redmond MD;  Location: [facility] OR;  Service: Orthopedics     FOOT SURGERY Left      GYN SURGERY  66,72     HAND SURGERY Right      HAND SURGERY Right       ESOPH/GAS REFLUX TEST W  "NASAL IMPED >1 HR  2012    Procedure:ESOPHAGEAL IMPEDENCE FUNCTION TEST WITH 24 HOUR PH GREATER THAN 1 HOUR; Surgeon:KAYKAY JULIO; Location:[facility]     IR LUMBAR EPIDURAL STEROID INJECTION       IR TRANSLAMINAR EPIDURAL LUMBAR INJ INCL IMAGING  2012    LESI L5-S1 at [facility]     LUMBAR FUSION       OPTICAL TRACKING SYSTEM FUSION POSTERIOR SPINE LUMBAR N/A 2017    Procedure: OPTICAL TRACKING SYSTEM FUSION SPINE POSTERIOR LUMBAR ONE LEVEL;  Surgeon: Anthony Michele MD;  Location: [facility]     ORTHOPEDIC SURGERY      left foot surgery     ORTHOPEDIC SURGERY      R MCP surgery     ORTHOPEDIC SURGERY      right knee total replacement     TOTAL HIP ARTHROPLASTY Right      TOTAL KNEE ARTHROPLASTY Left      TOTAL KNEE ARTHROPLASTY Right      ZZC ANESTH,TOTAL HIP ARTHROPLASTY      Rt hip     ZZC HAND/FINGER SURGERY UNLISTED  2005    right hand     ZZC TOTAL KNEE ARTHROPLASTY      left        Allergies   Allergen Reactions     Abatacept Other (See Comments)     Severe headaches  Migraine     Celebrex [Celecoxib]      Ineffective     Celecoxib Unknown and Nausea     Ethanol      Antihistamines     Levaquin [Levofloxacin] Fatigue     Severe body pain     Orencia [Abatacept]      Headache     Septra [Bactrim]      Sulfa Drugs Nausea and Vomiting     \"deathly ill\"  Allergic to everything with Sulfa in it.     Sulfamethoxazole-Trimethoprim Nausea and Nausea and Vomiting     Tramadol Other (See Comments)     Headache  Migraine headache     Valdecoxib Unknown and Nausea     Made me \"very very ill\", might of been \"cramping\"     Adhesive Tape Rash     Plastic tape  Plastic tapes     Antihistamines, Chlorpheniramine-Type  [Alkylamines] Anxiety and Other (See Comments)      Social History     Tobacco Use     Smoking status: Former     Packs/day: 1.00     Years: 41.00     Pack years: 41.00     Types: Cigarettes     Quit date: 1999     Years since quittin.1     Smokeless tobacco: " Never     Tobacco comments:     former smoker   Substance Use Topics     Alcohol use: Yes     Alcohol/week: 0.0 standard drinks     Comment: Occasionally,  usually wine      Wt Readings from Last 1 Encounters:   01/16/23 93.2 kg (205 lb 6.4 oz)        Anesthesia Evaluation   Pt has had prior anesthetic.     No history of anesthetic complications       ROS/MED HX  ENT/Pulmonary:     (+) sleep apnea, mild,  COPD,     Neurologic:     (+) peripheral neuropathy, migraines,     Cardiovascular:  - neg cardiovascular ROS   (+) -----valvular problems/murmurs Previous cardiac testing   Echo: Date: 2021 Results:  Technically difficult study.Extremely difficult acoustic windows despite the  use of contrast for endcardial border definition.  Global and regional left ventricular function is normal with an EF of 55-60%.  The right ventricle is normal size. Global right ventricular function is  normal.  Mild mitral stenosis is present. Mean gradient 4 mmHg, HR 84 bpm.  IVC diameter <2.1 cm collapsing >50% with sniff suggests a normal RA pressure  of 3 mmHg.  No pericardial effusion is present.  ______________________________________________________________________________  Stress Test: Date: Results:    ECG Reviewed: Date: Results:    Cath: Date: Results:      METS/Exercise Tolerance:     Hematologic:     (+) anemia,     Musculoskeletal:   (+) arthritis, fracture, Fracture location: LUE and RUE,     GI/Hepatic:     (+) GERD, Asymptomatic on medication,     Renal/Genitourinary:       Endo:     (+) thyroid problem,  Thyroid disease - Other, Obesity,     Psychiatric/Substance Use:     (+) psychiatric history anxiety and depression     Infectious Disease:       Malignancy:       Other:      (+) , H/O Chronic Pain,        Physical Exam    Airway        Mallampati: II   TM distance: > 3 FB   Neck ROM: full   Mouth opening: > 3 cm    Respiratory Devices and Support         Dental       (+) Modest Abnormalities - crowns, retainers, 1 or 2  missing teeth      Cardiovascular          Rhythm and rate: regular and normal     Pulmonary           breath sounds clear to auscultation           OUTSIDE LABS:  CBC:   Lab Results   Component Value Date    WBC 5.7 02/11/2023    WBC 6.3 01/16/2023    HGB 10.5 (L) 02/11/2023    HGB 11.0 (L) 01/16/2023    HCT 34.5 (L) 02/11/2023    HCT 35.4 01/16/2023     02/11/2023     01/16/2023     BMP:   Lab Results   Component Value Date     02/11/2023     10/12/2022    POTASSIUM 3.9 02/11/2023    POTASSIUM 4.1 10/12/2022    CHLORIDE 103 02/11/2023    CHLORIDE 103 10/12/2022    CO2 25 02/11/2023    CO2 25 10/12/2022    BUN 12.1 02/11/2023    BUN 8.3 10/12/2022    CR 0.51 02/11/2023    CR 0.68 01/16/2023     (H) 02/11/2023    GLC 99 10/12/2022     COAGS:   Lab Results   Component Value Date    INR 1.08 03/27/2017     POC:   Lab Results   Component Value Date    BGM 99 04/05/2017     HEPATIC:   Lab Results   Component Value Date    ALBUMIN 4.5 01/16/2023    PROTTOTAL 7.2 01/16/2023    ALT 27 01/16/2023    AST 31 01/16/2023    ALKPHOS 37 01/16/2023    BILITOTAL 0.4 01/16/2023     OTHER:   Lab Results   Component Value Date    A1C 5.6 08/12/2022    BRANDEE 9.3 02/11/2023    MAG 2.1 03/27/2020    TSH 1.12 11/27/2020    T4 0.95 01/27/2017    CRP <2.9 04/29/2022    SED 27 01/16/2023       Anesthesia Plan    ASA Status:  3   NPO Status:  NPO Appropriate    Anesthesia Type: General.     - Airway: ETT   Induction: Intravenous.   Maintenance: Balanced.   Techniques and Equipment:       - Drips/Meds: Dexmed. infusion     Consents    Anesthesia Plan(s) and associated risks, benefits, and realistic alternatives discussed. Questions answered and patient/representative(s) expressed understanding.    - Discussed:     - Discussed with:  Patient      - Extended Intubation/Ventilatory Support Discussed: No.      - Patient is DNR/DNI Status: No    Use of blood products discussed: No .     Postoperative Care    Pain  management: IV analgesics, Oral pain medications, Multi-modal analgesia.     - Plan for long acting post-op opioid use   PONV prophylaxis: Ondansetron (or other 5HT-3), Dexamethasone or Solumedrol     Comments:                Cristo Morales MD

## 2023-02-12 NOTE — PLAN OF CARE
PRIMARY DIAGNOSIS: ACUTE PAIN  OUTPATIENT/OBSERVATION GOALS TO BE MET BEFORE DISCHARGE:  1. Pain Status: Improved but still requiring IV narcotics.    2. Return to near baseline physical activity: No    3. Cleared for discharge by consultants (if involved): No    Discharge Planner Nurse   Safe discharge environment identified: No  Barriers to discharge: Yes       Entered by: Thalia Agosto RN 02/11/2023 6:28 PM     Please review provider order for any additional goals.   Nurse to notify provider when observation goals have been met and patient is ready for discharge.

## 2023-02-13 ENCOUNTER — APPOINTMENT (OUTPATIENT)
Dept: PHYSICAL THERAPY | Facility: CLINIC | Age: 83
DRG: 511 | End: 2023-02-13
Payer: COMMERCIAL

## 2023-02-13 ENCOUNTER — APPOINTMENT (OUTPATIENT)
Dept: OCCUPATIONAL THERAPY | Facility: CLINIC | Age: 83
DRG: 511 | End: 2023-02-13
Payer: COMMERCIAL

## 2023-02-13 ENCOUNTER — APPOINTMENT (OUTPATIENT)
Dept: GENERAL RADIOLOGY | Facility: CLINIC | Age: 83
DRG: 511 | End: 2023-02-13
Attending: PHYSICIAN ASSISTANT
Payer: COMMERCIAL

## 2023-02-13 PROCEDURE — 97116 GAIT TRAINING THERAPY: CPT | Mod: GP | Performed by: PHYSICAL THERAPIST

## 2023-02-13 PROCEDURE — 97535 SELF CARE MNGMENT TRAINING: CPT | Mod: GO

## 2023-02-13 PROCEDURE — 99233 SBSQ HOSP IP/OBS HIGH 50: CPT | Performed by: PHYSICIAN ASSISTANT

## 2023-02-13 PROCEDURE — 250N000013 HC RX MED GY IP 250 OP 250 PS 637: Performed by: ORTHOPAEDIC SURGERY

## 2023-02-13 PROCEDURE — 250N000011 HC RX IP 250 OP 636: Performed by: ORTHOPAEDIC SURGERY

## 2023-02-13 PROCEDURE — 250N000013 HC RX MED GY IP 250 OP 250 PS 637: Performed by: PHYSICIAN ASSISTANT

## 2023-02-13 PROCEDURE — 97530 THERAPEUTIC ACTIVITIES: CPT | Mod: GO

## 2023-02-13 PROCEDURE — 97530 THERAPEUTIC ACTIVITIES: CPT | Mod: GP | Performed by: PHYSICAL THERAPIST

## 2023-02-13 PROCEDURE — 97166 OT EVAL MOD COMPLEX 45 MIN: CPT | Mod: GO

## 2023-02-13 PROCEDURE — 120N000001 HC R&B MED SURG/OB

## 2023-02-13 PROCEDURE — 71101 X-RAY EXAM UNILAT RIBS/CHEST: CPT | Mod: RT

## 2023-02-13 PROCEDURE — 97161 PT EVAL LOW COMPLEX 20 MIN: CPT | Mod: GP | Performed by: PHYSICAL THERAPIST

## 2023-02-13 RX ORDER — CARBOXYMETHYLCELLULOSE SODIUM 5 MG/ML
1 SOLUTION/ DROPS OPHTHALMIC 2 TIMES DAILY
Status: DISCONTINUED | OUTPATIENT
Start: 2023-02-13 | End: 2023-02-15 | Stop reason: HOSPADM

## 2023-02-13 RX ADMIN — SENNOSIDES AND DOCUSATE SODIUM 1 TABLET: 50; 8.6 TABLET ORAL at 09:28

## 2023-02-13 RX ADMIN — Medication 1 DROP: at 20:47

## 2023-02-13 RX ADMIN — OXYCODONE HYDROCHLORIDE 2.5 MG: 5 TABLET ORAL at 09:26

## 2023-02-13 RX ADMIN — OXYCODONE HYDROCHLORIDE 5 MG: 5 TABLET ORAL at 20:46

## 2023-02-13 RX ADMIN — LORATADINE 10 MG: 10 TABLET ORAL at 20:47

## 2023-02-13 RX ADMIN — SENNOSIDES AND DOCUSATE SODIUM 2 TABLET: 50; 8.6 TABLET ORAL at 20:47

## 2023-02-13 RX ADMIN — ACETAMINOPHEN 975 MG: 325 TABLET ORAL at 13:22

## 2023-02-13 RX ADMIN — GABAPENTIN 600 MG: 300 CAPSULE ORAL at 20:47

## 2023-02-13 RX ADMIN — HYDROXYZINE HYDROCHLORIDE 25 MG: 25 TABLET ORAL at 10:35

## 2023-02-13 RX ADMIN — FAMOTIDINE 20 MG: 20 TABLET, FILM COATED ORAL at 20:47

## 2023-02-13 RX ADMIN — ACETAMINOPHEN 975 MG: 325 TABLET ORAL at 20:46

## 2023-02-13 RX ADMIN — ACETAMINOPHEN 975 MG: 325 TABLET ORAL at 04:33

## 2023-02-13 RX ADMIN — Medication 1 DROP: at 13:24

## 2023-02-13 RX ADMIN — BUSPIRONE HYDROCHLORIDE 10 MG: 10 TABLET ORAL at 20:47

## 2023-02-13 RX ADMIN — GABAPENTIN 600 MG: 300 CAPSULE ORAL at 09:28

## 2023-02-13 RX ADMIN — GUAIFENESIN 600 MG: 600 TABLET, EXTENDED RELEASE ORAL at 09:28

## 2023-02-13 RX ADMIN — BUSPIRONE HYDROCHLORIDE 10 MG: 10 TABLET ORAL at 09:28

## 2023-02-13 RX ADMIN — FAMOTIDINE 20 MG: 20 TABLET, FILM COATED ORAL at 09:28

## 2023-02-13 RX ADMIN — LORATADINE 10 MG: 10 TABLET ORAL at 09:29

## 2023-02-13 RX ADMIN — CEFAZOLIN 1 G: 1 INJECTION, POWDER, FOR SOLUTION INTRAMUSCULAR; INTRAVENOUS at 04:27

## 2023-02-13 RX ADMIN — GUAIFENESIN 600 MG: 600 TABLET, EXTENDED RELEASE ORAL at 20:47

## 2023-02-13 RX ADMIN — DESVENLAFAXINE SUCCINATE 100 MG: 100 TABLET, FILM COATED, EXTENDED RELEASE ORAL at 09:27

## 2023-02-13 RX ADMIN — OXYCODONE HYDROCHLORIDE 5 MG: 5 TABLET ORAL at 16:15

## 2023-02-13 RX ADMIN — ATORVASTATIN CALCIUM 40 MG: 40 TABLET, FILM COATED ORAL at 09:28

## 2023-02-13 ASSESSMENT — ACTIVITIES OF DAILY LIVING (ADL)
ADLS_ACUITY_SCORE: 30
ADLS_ACUITY_SCORE: 32
ADLS_ACUITY_SCORE: 30
ADLS_ACUITY_SCORE: 32
ADLS_ACUITY_SCORE: 24
ADLS_ACUITY_SCORE: 30
ADLS_ACUITY_SCORE: 30
ADLS_ACUITY_SCORE: 24
ADLS_ACUITY_SCORE: 32

## 2023-02-13 NOTE — PLAN OF CARE
"[id number]    Inpatient Progress Note:    /58 (BP Location: Left leg)   Pulse 77   Temp 97.4  F (36.3  C) (Oral)   Resp 16   Ht 1.664 m (5' 5.5\")   SpO2 95%   BMI 33.66 kg/m       Pt Aox4, forgetful at times. Fingers cool to the touch. Mild edema in fingers, able to move fingers with cap refill < 3 seconds. Denies numbness and tingling. Unable to palpate radial pulses d/t BUE splints. Splints CDI. 3/10 pain in right elbow, scheduled tylenol given. BUE elevated on pillows. PIV in right lower leg covered with no-no, SL. On CPAP on for part of the night, currently on 2L O2 NC d/t pt preference. Assist of 2 pivot to BSC. Prophylactic mepilex on sacrum/coccyx. Ancef q8h. Regular diet. Voiding. Ortho following. PT, OT, SW consults in morning. Will continue to monitor and provide supportive cares.          "

## 2023-02-13 NOTE — PLAN OF CARE
Goal Outcome Evaluation:    A&Ox4. Up Ax2 with gait belt to bedside commode. VSS on 2.5L. CPAP on for bedtime. Pt reporting mild pain 2-5/10 given prn oxy with reported relief. Pt in bed arms elevated on pillows, bed elevated per orders. Resting comfortably. IV SL. Tolerating reg diet. Pt voiding adequately with no difficulties. Plan for PT/OT/SW consults for likely discharge to TCU.

## 2023-02-13 NOTE — PROGRESS NOTES
[facility]    Medicine Progress Note - Hospitalist Service    Date of Admission:  2/11/2023    Assessment & Plan  Ginger Marshall is a 82 year old female admitted on 2/11 with PMH history of right rib fracture, depression, anxiety, GERD, rheumatoid arthritis, osteoporosis, who was admitted on 2/11/2023 for mechanical fall with right elbow and left distal radial, ulnar fracture.    She fell while walking her dog. Admitted to observation. Taken to the OR 2/12, s/p ORIF Left distal radius fracture, ORIF Right olecranon fracture.     Mechanical Fall  Right elbow and left distal radial, ulnar fracture s/p ORIF Left distal radius fracture, ORIF Right olecranon fracture.   Closed Head Injury/Facial Injury  Post Operative Anemia, mild  - completing IV abx ppx 24 hours per ortho  - Able to use left arm to flex to mouth for ADLs and may weight bear through forearms for ambulation as needed.  - PT/OT/SW. need TCU placement.  - monitor hgb, pre op hemoglobin of 10.5 -> 9.4   - analgesia PRN, is sore but no uncontrolled pain with current regimen: scheduled tylenol, ice, elevate, oxycodone PRN, IV Dilaudid PRN, will resume pta gabapentin dose  - stool softeners PRN  - DVT ppx per ortho     Episode of vision change  Reported episode of double vision p.m. of 2/12. Resolved without intervention.  It is possible this was related to medications.  Overnight provider was notified, no change in neurologic exam per RN. Did not have CT scan repeated of head.  -Patient feels this was related to dry eyes and is requesting scheduled eyedrops, takes these at home  -If symptoms recur low threshold for CT scan head to evaluate for vascular abnormality, lower suspicion for TIA/CVA would not anticipate exact symptoms would recur if this was the case  -Monitor    Rheumatoid arthritis  -PTA methotrexate once weekly on Thursday and resume updacitinib      Osteoporosis   - Zoledronic acid next dose due March 2023     Hx Closed  "right rib fx   - noted on XR on 1/8/23, healing well     Incidental left thyroid nodule  -Recommend outpatient thyroid ultrasound     Diet: Advance Diet as Tolerated: Regular Diet Adult    DVT Prophylaxis: Pneumatic Compression Devices  Nichols Catheter: Not present  Lines: None     Cardiac Monitoring: None  Code Status: No CPR- Do NOT Intubate      Clinically Significant Risk Factors                        # Obesity: Estimated body mass index is 33.66 kg/m  as calculated from the following:    Height as of this encounter: 1.664 m (5' 5.5\").    Weight as of 1/16/23: 93.2 kg (205 lb 6.4 oz)., PRESENT ON ADMISSION         Disposition Plan      Expected Discharge Date: 02/14/2023        Discharge Comments: Once TCU bed available, pending therapy evals        The patient's care was discussed with the Attending Physician, Dr. Vargas, Bedside Nurse and Care Coordinator/.    Mahogany Barney PA-C  Hospitalist Service  [facility]  Securely message with [employer] (more info)  Text page via [email or web address] Paging/Directory   ______________________________________________________________________    Interval History   No acute events overnight.  Placed on supplemental oxygen as did not wear CPAP last night.  No dyspnea, cough or chest pain.   Taking Tylenol, 2.5 to 5 mg oxycodone, gabapentin for pain.  Awaiting PT consult.  She is more sore in her elbow today.  She did have an episode of double vision described when she was watching football stating that there were \"more players on the screen \".  This did resolve.  RN thought possibly after given oxycodone.  Could not specify whether horizontal or double vision or whether persisted in 1 eye or both.  Has not recurred after pain medications.  She equates double vision due to lack of use of her home eyedrops.  No headache.  No unilateral weakness in extremities.  No speech changes.  No dizziness.    Voiding.  Last bowel movement not recorded.    Physical Exam "   Vital Signs: Temp: 98.6  F (37  C) Temp src: Oral BP: (!) 147/69 Pulse: 70   Resp: 17 SpO2: 96 % Weight: 0 lbs 0 oz    Constitutional: Awake, alert, no apparent distress  Respiratory:  Normal work of breathing. Lungs clear to auscultation bilaterally, no crackles or wheezing.  Cardiovascular: Regular rate and rhythm, normal S1 and S2, and no murmur appreciated.   GI: Bowel sounds present. soft, non-distended, non-tender.   MK: Both upper extremities are splinted.  Capillary refill and and sensation intact in both fingertips.  HEENT: Ecchymosis right eye. soft tissue swelling overlying the nasal arch.  Tearing and mild drainage right inner eyelid.  No nystagmus.  Extraocular eye movements are intact.  No gross deficits on visual acuity.  No current double vision.  Chipped tooth but otherwise dentition bridge intact.  Skin/Integument: Warm, dry. no peripheral edema.  Neuro: No focal deficits. Moving all extremities with normal strength. Coordination and sensation grossly intact. Speech clear. No focal deficits.   Psych: Appropriate affect.      Medical Decision Making       55 MINUTES SPENT BY ME on the date of service doing chart review, history, exam, documentation & further activities per the note.      Data   ------------------------- PAST 24 HR DATA REVIEWED -----------------------------------------------[id number]}

## 2023-02-13 NOTE — PLAN OF CARE
Vital signs:  Temp: 98.8  F (37.1  C) Temp src: Oral BP: (!) 147/64 Pulse: 84   Resp: 16 SpO2: 93 % O2 Device: None (Room air)   Labs: Hgb: 9.4 from 2/12/23 @ 1633  Cardiac: WDL; denies CP, some mild edema on fingers. CLARK radial pulses due to splints.  Resp: WDL; on RA, denies SOB  Neuro: A&Ox4, Upper arms CMS in tact, Pt reports Left arm is harder to lift than right.   GI: X; no BM given stool softer, passing gas  : WDL  Skin: Bruising on face. Both arms splinted, elevated on pillows.  Activity: Up Ax1 with platform walker. PCD on when in bed.  Diet: Tolerating a reg diet  Pain: Reporting 4-5/10 pain on elbows/arms. Scheduled tylenol, PRN 2.5mg Oxy x1, 5 mg Oxy x1, PRN 25 mg Hydroxyzine x1.   Plan: Referrals sent to TCU; bed available for Thursday but SW will see if there is sooner bed.    Restarted on Upadacitinib got home medication labeled; in cabinet. PIV on right foot removed and it was painful for pt when flushing. Ortho gave verbal order that IV Abx doses are okay to cancel as she had received two doses before.

## 2023-02-13 NOTE — PROGRESS NOTES
02/13/23 1238   Appointment Info   Signing Clinician's Name / Credentials (PT) Tim Sanz DPT   Rehab Comments (PT) WBAT L UE platform; NWB R UE   Living Environment   Living Environment Comments Pt reports living alone in apt; use of 4ww for all mobility.   Self-Care   Usual Activity Tolerance good   Current Activity Tolerance moderate   Equipment Currently Used at Home walker, rolling   Fall history within last six months yes   Number of times patient has fallen within last six months 3   General Information   Onset of Illness/Injury or Date of Surgery 02/11/23   Referring Physician Pili Brock MD   Patient/Family Therapy Goals Statement (PT) To improve mobility, pain   Pertinent History of Current Problem (include personal factors and/or comorbidities that impact the POC) Pt is a 82 year old female admitted on 2/11 with PMH history of right rib fracture, depression, anxiety, GERD, rheumatoid arthritis, osteoporosis, who was admitted on 2/11/2023 for mechanical fall with right elbow and left distal radial, ulnar fracture.   Existing Precautions/Restrictions fall;weight bearing   Weight-Bearing Status - LUE weight-bearing as tolerated  (platform okay per ortho)   Weight-Bearing Status - RUE nonweight-bearing   Cognition   Affect/Mental Status (Cognition) WFL   Orientation Status (Cognition) oriented x 4   Follows Commands (Cognition) WFL   Pain Assessment   Patient Currently in Pain Yes, see Vital Sign flowsheet  (L UE)   Range of Motion (ROM)   ROM Comment decreased B UEs; B LE WFL   Strength (Manual Muscle Testing)   Strength Comments able to complete B SLR   Bed Mobility   Comment, (Bed Mobility) Sheri supine  <> sit   Transfers   Comment, (Transfers) Sheri sit <> stand with platform walker   Gait/Stairs (Locomotion)   Pattern (Gait) step-through   Deviations/Abnormal Patterns (Gait) base of support, wide;gait speed decreased   Comment, (Gait/Stairs) Sheri with platform walker   Balance    Balance Comments good sitting; fair standing with platform walker   Clinical Impression   Criteria for Skilled Therapeutic Intervention Yes, treatment indicated   PT Diagnosis (PT) impaired functional mobility   Influenced by the following impairments impaired balance, UE WB restriction   Functional limitations due to impairments impaired bed mobility, transfers, ambulation   Clinical Presentation (PT Evaluation Complexity) Stable/Uncomplicated   Clinical Presentation Rationale Pt is medically stable   Clinical Decision Making (Complexity) low complexity   Planned Therapy Interventions (PT) balance training;bed mobility training;gait training;neuromuscular re-education;patient/family education;strengthening;transfer training   Anticipated Equipment Needs at Discharge (PT) walker, rolling platform   Risk & Benefits of therapy have been explained evaluation/treatment results reviewed;care plan/treatment goals reviewed;risks/benefits reviewed;current/potential barriers reviewed;participants voiced agreement with care plan;participants included;patient   PT Total Evaluation Time   PT Eval, Low Complexity Minutes (58470) 7   Physical Therapy Goals   PT Frequency 5x/week   PT Predicted Duration/Target Date for Goal Attainment 02/15/23   PT Goals Bed Mobility;Transfers;Gait   PT: Bed Mobility Supervision/stand-by assist;Supine to/from sit;Within precautions   PT: Transfers Supervision/stand-by assist;Sit to/from stand;Assistive device;Within precautions   PT: Gait Supervision/stand-by assist;Assistive device;Within precautions;150 feet   PT Discharge Planning   PT Plan progress ind with bed mobility, transfers, ambulate with platform   PT Discharge Recommendation (DC Rec) Transitional Care Facility   PT Rationale for DC Rec Pt below baseline mobility requiring Ax1 with all mobility, will require time in TCU setting to progress mobility before return to home.   PT Brief overview of current status Ax1 with platform walker    Total Session Time   Total Session Time (sum of timed and untimed services) 7

## 2023-02-13 NOTE — PROGRESS NOTES
02/13/23 1100   Appointment Info   Signing Clinician's Name / Credentials (OT) Candace Burchne, OTR/L   Living Environment   Living Environment Comments pt reports that she lives in apartment alone. stairs to get into the garage area. stand up shower   Self-Care   Usual Activity Tolerance good   Current Activity Tolerance moderate   Equipment Currently Used at Home walker, rolling   Fall history within last six months yes   Number of times patient has fallen within last six months 3  (at least)   Instrumental Activities of Daily Living (IADL)   IADL Comments indp baseline with IADL including driving, walks dog   General Information   Onset of Illness/Injury or Date of Surgery 02/11/23   Referring Physician Pili Brock MD   Patient/Family Therapy Goal Statement (OT) agreeable to TCU   Additional Occupational Profile Info/Pertinent History of Current Problem Ginger Marshall is a 82 year old female admitted on 2/11 with PMH history of right rib fracture, depression, anxiety, GERD, rheumatoid arthritis, osteoporosis, who was admitted on 2/11/2023 for mechanical fall with right elbow and left distal radial, ulnar fracture.     She fell while walking her dog. Admitted to observation. Taken to the OR 2/12, s/p ORIF Left distal radius fracture, ORIF Right olecranon fracture.   Left Upper Extremity (Weight-bearing Status) weight-bearing as tolerated (WBAT)  (through forearm ok for platform walker)   Right Upper Extremity (Weight-bearing Status) non weight-bearing (NWB)   General Observations and Info WB per ortho note and conversation with PA   Cognitive Status Examination   Orientation Status orientation to person, place and time   Visual Perception   Visual Impairment/Limitations corrective lenses full-time   Sensory   Sensory Quick Adds sensation intact   Pain Assessment   Patient Currently in Pain Yes, see Vital Sign flowsheet   Posture   Posture forward head position;protracted shoulders   Range of Motion  Comprehensive   Comment, General Range of Motion R WFL at  full. R splited. L WFL at SH. L elbow splinted thus no ROM. pain with wiggling fingers. reprots has had surgery many times on hands and fingers, very lmited given splits. arthritis baseline   Strength Comprehensive (MMT)   Comment, General Manual Muscle Testing (MMT) Assessment BUE NT given splits and WB status. generalized weakness BLE and core   Muscle Tone Assessment   Muscle Tone Quick Adds No deficits were identified   Coordination   Upper Extremity Coordination Left UE impaired;Right UE impaired   Bed Mobility   Bed Mobility supine-sit   Supine-Sit Llewellyn (Bed Mobility) minimum assist (75% patient effort)   Transfers   Transfers sit-stand transfer   Sit-Stand Transfer   Sit-Stand Llewellyn (Transfers) minimum assist (75% patient effort)   Sit/Stand Transfer Comments platform walker   Balance   Balance Comments defer to PT   Activities of Daily Living   BADL Assessment/Intervention bathing;upper body dressing;lower body dressing;grooming;feeding;toileting   Bathing Assessment/Intervention   Llewellyn Level (Bathing) dependent (less than 25% patient effort)   Upper Body Dressing Assessment/Training   Llewellyn Level (Upper Body Dressing) maximum assist (25% patient effort)   Lower Body Dressing Assessment/Training   Llewellyn Level (Lower Body Dressing) dependent (less than 25% patient effort)   Grooming Assessment/Training   Llewellyn Level (Grooming) maximum assist (25% patient effort)   Eating/Self Feeding   Llewellyn Level (Feeding) maximum assist (25% patient effort)   Toileting   Llewellyn Level (Toileting) dependent (less than 25% patient effort)   Clinical Impression   Criteria for Skilled Therapeutic Interventions Met (OT) Yes, treatment indicated   OT Diagnosis decreased function in ADL   OT Problem List-Impairments impacting ADL problems related to;activity tolerance impaired;mobility;range of motion  (ROM);pain;post-surgical precautions;coordination   Assessment of Occupational Performance 5 or more Performance Deficits   Identified Performance Deficits bathing, dressing, toileting, home mgmt, mobility, self care, IADL   Planned Therapy Interventions (OT) ADL retraining;IADL retraining;progressive activity/exercise   Clinical Decision Making Complexity (OT) moderate complexity   Anticipated Equipment Needs Upon Discharge (OT)   (platform walker in room)   Risk & Benefits of therapy have been explained evaluation/treatment results reviewed;care plan/treatment goals reviewed;risks/benefits reviewed;current/potential barriers reviewed;participants voiced agreement with care plan;participants included;patient   Clinical Impression Comments decreased function in ADL warrants skilled IP OT tx   OT Total Evaluation Time   OT Eval, Moderate Complexity Minutes (16516) 20   OT Goals   Therapy Frequency (OT) 5 times/wk   OT Predicted Duration/Target Date for Goal Attainment 02/24/23   OT Goals Hygiene/Grooming;Upper Body Dressing;Lower Body Dressing;Toilet Transfer/Toileting;OT Goal 1   OT: Hygiene/Grooming minimal assist   OT: Upper Body Dressing Minimal assist   OT: Lower Body Dressing Minimal assist   OT: Toilet Transfer/Toileting Minimal assist   OT: Goal 1 Pt will verbalize understanding of WB status for B UE   Interventions   Interventions Quick Adds Self-Care/Home Management;Therapeutic Activity   Self-Care/Home Management   Self-Care/Home Mgmt/ADL, Compensatory, Meal Prep Minutes (44289) 8   Symptoms Noted During/After Treatment (Meal Preparation/Planning Training) fatigue   Treatment Detail/Skilled Intervention pt seen for OT tx session this date. agreeable. educated on WB status for BUE and progressing transfer and ADL. verbalized understanding. educated on LB dressing techniques. max A to complete with brief in seated over feet and then in standing. pt unable to self feed or complete g/h, may benefit from  adaptive equipt, educated, will assess.   Therapeutic Activities   Therapeutic Activity Minutes (63169) 8   Symptoms noted during/after treatment increased pain   Treatment Detail/Skilled Intervention sat EOB with no balance deficits for 5 mintues. completed sit>STand with platform walker, educated on WB through forarm for balance, light placement on R UE on handle but no WB. min A to stand, amb 10 feet with min A and VC for mgmt of FWW   OT Discharge Planning   OT Plan platform walker to commode. problem solve self cares and self feeding   OT Discharge Recommendation (DC Rec) Transitional Care Facility   OT Rationale for DC Rec pt well below baseline function in self care and ADL. limited by new WB precautions, strength, ROM, splits and fall risk. recommend TCU At discharge to increase function and safety for basic activites. pt lives alone and would not be able to care for self   OT Brief overview of current status platform walker. max A ADL   Total Session Time   Timed Code Treatment Minutes 16   Total Session Time (sum of timed and untimed services) 36

## 2023-02-13 NOTE — CONSULTS
Care Management Initial Consult    General Information  Assessment completed with: Patient, Children, Patient and daughter  Type of CM/SW Visit: Initial Assessment    Primary Care Provider verified and updated as needed: No   Readmission within the last 30 days: no previous admission in last 30 days      Reason for Consult: discharge planning  Advance Care Planning:            Communication Assessment  Patient's communication style: spoken language (English or Bilingual)    Hearing Difficulty or Deaf: no   Wear Glasses or Blind: yes    Cognitive  Cognitive/Neuro/Behavioral: WDL  Level of Consciousness: sedated                   Living Environment:   People in home: child(eric), adult     Current living Arrangements:        Able to return to prior arrangements:         Family/Social Support:  Care provided by:    Provides care for:       Children          Description of Support System:           Current Resources:   Patient receiving home care services:       Community Resources:    Equipment currently used at home: walker, rolling  Supplies currently used at home:      Employment/Financial:  Employment Status:          Financial Concerns:             Lifestyle & Psychosocial Needs:  Social Determinants of Health     Tobacco Use: Medium Risk     Smoking Tobacco Use: Former     Smokeless Tobacco Use: Never     Passive Exposure: Not on file   Alcohol Use: Not on file   Financial Resource Strain: Not on file   Food Insecurity: Not on file   Transportation Needs: Not on file   Physical Activity: Not on file   Stress: Not on file   Social Connections: Not on file   Intimate Partner Violence: Not on file   Depression: Not at risk     PHQ-2 Score: 2   Housing Stability: Not on file       Functional Status:  Prior to admission patient needed assistance:              Mental Health Status:  Mental Health Status: No Current Concerns       Chemical Dependency Status:  Chemical Dependency Status: No Current Concerns              Care Management Discharge Note    Discharge Date: 02/14/2023       Discharge Disposition: Transitional Care    Discharge Services:      Discharge DME: None    Discharge Transportation: family or friend will provide    Private pay costs discussed: insurance costs out of pocket expenses    PAS Confirmation Code:    Patient/family educated on Medicare website which has current facility and service quality ratings: yes    Education Provided on the Discharge Plan:    Persons Notified of Discharge Plans: Patient and daughter  Patient/Family in Agreement with the Plan: yes    Handoff Referral Completed: No    Additional Information:  SW met with patient and daughter at bedside. PT and OT rec tcu. SW explained insurance coverage and benefits of TCU. Patient is agreeable. SW gave [facility] List. Referrals were sent. Patient prefers to be close to [place].     Daughter will transport. She usually works until 1730.     Addendum 1632: [facility] will [facility] a bed Thursday. Will continue to look to see if there is a sooner placement.     RAUL Kumar, [facility]  Emergency Room   [phone]-Please contact the SW on the floor in which the patient is staying for any questions or concerns

## 2023-02-13 NOTE — PROGRESS NOTES
Orthopedic Progress Note    February 13, 2023         Interval History:     Pain is well controlled. She admits to more pain in her left upper extremity than her right. Her bilateral splints are clean and intact. Tolerating po. Nichols discontinued post-operatively.  She denies numbness and tingling into any of her fingers. She has been working on finger range of motion. She denies fever and chills. Denies CP/SOB/N/V.          Physical Exam:     Temp: 98.6  F (37  C) Temp src: Oral BP: (!) 147/69 Pulse: 70   Resp: 17 SpO2: 96 % O2 Device: Nasal cannula Oxygen Delivery: 2 LPM  Constitutional: Awake and alert. Healthy. Well nourished and appears stated age.   Respiratory: Regular and non-labored.  Neurological: NAD, A&O x 3  Skin: Warm and dry.   Musculoskeletal:    Right upper extremity:  Long arm splint intact and dry. Finger flexion, extension, and abduction intact. Thumb IP joint flexion and extension intact. Sensation intact to all dermatomes. Capillary refill < 3 seconds. Mild swelling in the digits.    Left upper extremity:  Short arm splint intact and dry. Elbow flexion and extension intact. Forearm rotation not tested. Finger flexion, extension, and abduction intact. Thumb IP joint flexion and extension intact. Sensation intact to all dermatomes. Capillary refill < 3 seconds. Mild swelling in the digits.         Data:     Lab Results   Component Value Date    HGB 9.4 02/12/2023    HGB 11.3 05/10/2021     Lab Results   Component Value Date     02/12/2023     05/10/2021     Lab Results   Component Value Date    INR 1.08 03/27/2017     Intake/Output Summary (Last 24 hours) at 2/13/2023 1036  Last data filed at 2/13/2023 0414  Gross per 24 hour   Intake 2060 ml   Output 2370 ml   Net -310 ml             Assessment and Plan:    Assessment:   82 year old female s/p open reduction and internal fixation of a right olecranon fracture and open reduction internal fixation of a left distal radius fracture,  doing well.       Plan:   - Discussed eventual discharge to TCU   - PO pain medications: tylenol, PRN oxycodone   - Ice and elevate bilateral upper extremities   - WBAT   - PT and OT consultation - weight bearing with left forearm and elbow is okay. No weight bearing with right upper extremity   - Advance diet as tolerated  - DVT prophylaxis with pneumatic compression devices   - Bowel Program with stool softeners as needed   - Appreciate medicine co-management        VALERIO DUBOSEC

## 2023-02-13 NOTE — PLAN OF CARE
Flushed Pt's IV that is on her right foot; cause pt pain with flushing. Has limited access for IV placement. Informed hospitalist if we can get Oral ABx as she is scheduled for Ancef IV. Due to this being ordered by Orthopedics, called to see who is on call to see if this transition can be made.     Awaiting response.

## 2023-02-14 LAB
ATRIAL RATE - MUSE: 69 BPM
DIASTOLIC BLOOD PRESSURE - MUSE: NORMAL MMHG
INTERPRETATION ECG - MUSE: NORMAL
P AXIS - MUSE: -4 DEGREES
PR INTERVAL - MUSE: 176 MS
QRS DURATION - MUSE: 86 MS
QT - MUSE: 444 MS
QTC - MUSE: 475 MS
R AXIS - MUSE: 22 DEGREES
SYSTOLIC BLOOD PRESSURE - MUSE: NORMAL MMHG
T AXIS - MUSE: -8 DEGREES
VENTRICULAR RATE- MUSE: 69 BPM

## 2023-02-14 PROCEDURE — 250N000013 HC RX MED GY IP 250 OP 250 PS 637: Performed by: ORTHOPAEDIC SURGERY

## 2023-02-14 PROCEDURE — 120N000001 HC R&B MED SURG/OB

## 2023-02-14 PROCEDURE — 99233 SBSQ HOSP IP/OBS HIGH 50: CPT | Performed by: PHYSICIAN ASSISTANT

## 2023-02-14 PROCEDURE — 250N000013 HC RX MED GY IP 250 OP 250 PS 637: Performed by: INTERNAL MEDICINE

## 2023-02-14 PROCEDURE — 250N000013 HC RX MED GY IP 250 OP 250 PS 637: Performed by: PHYSICIAN ASSISTANT

## 2023-02-14 RX ORDER — HYDROMORPHONE HYDROCHLORIDE 2 MG/1
4 TABLET ORAL
Status: DISCONTINUED | OUTPATIENT
Start: 2023-02-14 | End: 2023-02-15 | Stop reason: HOSPADM

## 2023-02-14 RX ORDER — LIDOCAINE 4 G/G
1 PATCH TOPICAL
Status: DISCONTINUED | OUTPATIENT
Start: 2023-02-14 | End: 2023-02-15 | Stop reason: HOSPADM

## 2023-02-14 RX ORDER — HYDROMORPHONE HYDROCHLORIDE 2 MG/1
2 TABLET ORAL
Status: DISCONTINUED | OUTPATIENT
Start: 2023-02-14 | End: 2023-02-15 | Stop reason: HOSPADM

## 2023-02-14 RX ORDER — POLYETHYLENE GLYCOL 3350 17 G/17G
17 POWDER, FOR SOLUTION ORAL DAILY PRN
Status: DISCONTINUED | OUTPATIENT
Start: 2023-02-14 | End: 2023-02-15 | Stop reason: HOSPADM

## 2023-02-14 RX ADMIN — Medication 1 DROP: at 20:04

## 2023-02-14 RX ADMIN — FAMOTIDINE 20 MG: 20 TABLET, FILM COATED ORAL at 20:05

## 2023-02-14 RX ADMIN — HYDROMORPHONE HYDROCHLORIDE 4 MG: 2 TABLET ORAL at 08:51

## 2023-02-14 RX ADMIN — SENNOSIDES AND DOCUSATE SODIUM 2 TABLET: 50; 8.6 TABLET ORAL at 08:13

## 2023-02-14 RX ADMIN — HYDROMORPHONE HYDROCHLORIDE 2 MG: 2 TABLET ORAL at 05:18

## 2023-02-14 RX ADMIN — HYDROMORPHONE HYDROCHLORIDE 2 MG: 2 TABLET ORAL at 19:04

## 2023-02-14 RX ADMIN — GUAIFENESIN 600 MG: 600 TABLET, EXTENDED RELEASE ORAL at 20:06

## 2023-02-14 RX ADMIN — BUSPIRONE HYDROCHLORIDE 10 MG: 10 TABLET ORAL at 20:05

## 2023-02-14 RX ADMIN — BUSPIRONE HYDROCHLORIDE 10 MG: 10 TABLET ORAL at 08:12

## 2023-02-14 RX ADMIN — ACETAMINOPHEN 975 MG: 325 TABLET ORAL at 20:04

## 2023-02-14 RX ADMIN — OXYCODONE HYDROCHLORIDE 5 MG: 5 TABLET ORAL at 00:26

## 2023-02-14 RX ADMIN — ATORVASTATIN CALCIUM 40 MG: 40 TABLET, FILM COATED ORAL at 08:12

## 2023-02-14 RX ADMIN — Medication 1 DROP: at 08:12

## 2023-02-14 RX ADMIN — FAMOTIDINE 20 MG: 20 TABLET, FILM COATED ORAL at 08:13

## 2023-02-14 RX ADMIN — GABAPENTIN 600 MG: 300 CAPSULE ORAL at 08:11

## 2023-02-14 RX ADMIN — LORATADINE 10 MG: 10 TABLET ORAL at 20:05

## 2023-02-14 RX ADMIN — LIDOCAINE PATCH 4% 1 PATCH: 40 PATCH TOPICAL at 02:46

## 2023-02-14 RX ADMIN — ACETAMINOPHEN 975 MG: 325 TABLET ORAL at 13:01

## 2023-02-14 RX ADMIN — SENNOSIDES AND DOCUSATE SODIUM 2 TABLET: 50; 8.6 TABLET ORAL at 20:05

## 2023-02-14 RX ADMIN — ACETAMINOPHEN 975 MG: 325 TABLET ORAL at 04:47

## 2023-02-14 RX ADMIN — HYDROXYZINE HYDROCHLORIDE 50 MG: 50 TABLET ORAL at 00:25

## 2023-02-14 RX ADMIN — GABAPENTIN 600 MG: 300 CAPSULE ORAL at 20:04

## 2023-02-14 RX ADMIN — OXYCODONE HYDROCHLORIDE 5 MG: 5 TABLET ORAL at 04:47

## 2023-02-14 RX ADMIN — LIDOCAINE PATCH 4% 1 PATCH: 40 PATCH TOPICAL at 20:04

## 2023-02-14 RX ADMIN — DESVENLAFAXINE SUCCINATE 100 MG: 100 TABLET, FILM COATED, EXTENDED RELEASE ORAL at 08:12

## 2023-02-14 RX ADMIN — GUAIFENESIN 600 MG: 600 TABLET, EXTENDED RELEASE ORAL at 08:13

## 2023-02-14 RX ADMIN — LORATADINE 10 MG: 10 TABLET ORAL at 08:12

## 2023-02-14 ASSESSMENT — ACTIVITIES OF DAILY LIVING (ADL)
ADLS_ACUITY_SCORE: 32
ADLS_ACUITY_SCORE: 30
ADLS_ACUITY_SCORE: 32
ADLS_ACUITY_SCORE: 30
ADLS_ACUITY_SCORE: 32
ADLS_ACUITY_SCORE: 30

## 2023-02-14 NOTE — PLAN OF CARE
"[id number]    Inpatient Progress Note:    /64 (BP Location: Left leg)   Pulse 72   Temp 98.5  F (36.9  C) (Oral)   Resp 18   Ht 1.664 m (5' 5.5\")   SpO2 93%   BMI 33.66 kg/m       Pt Aox4, forgetful at times. Pain 8-10/10 right rib cage pain, 4/10 in right elbow and left wrist. Significant rib pain with movement and activity. Tyelnol, atarax, and dilaudid given. Ice applied and pillow support provided. Aromatherapy provided. Pain and mobility improved after dilaudid given. BUE splinted. Mild edema in fingers. Fingers warm and able to wiggle fingers. Cap refill less than 3 seconds. Unable to palpate radial pulses d/t splints. Assist of 1 with platform walker. Tolerating regular diet. Voiding. Will continue to monitor and provide supportive cares.        "

## 2023-02-14 NOTE — PROGRESS NOTES
[facility]    Medicine Progress Note - Hospitalist Service    Date of Admission:  2/11/2023    Assessment & Plan   Ginger Marshall is a 82 year old female admitted on 2/11 with PMH history of right rib fracture, depression, anxiety, GERD, rheumatoid arthritis, osteoporosis, who was admitted on 2/11/2023 for mechanical fall with right elbow and left distal radial, ulnar fracture.    She fell while walking her dog. Admitted to observation. Taken to the OR 2/12, s/p ORIF Left distal radius fracture, ORIF Right olecranon fracture.     Mechanical Fall  Right elbow and left distal radial, ulnar fracture s/p ORIF Left distal radius fracture, ORIF Right olecranon fracture.   Closed Head Injury/Facial Injury  Post Operative Anemia, mild  - completing IV abx ppx 24 hours per ortho  - Able to use left arm to flex to mouth for ADLs and may weight bear through forearms for ambulation as needed.  - PT/OT/SW. need TCU placement.  - monitor hgb, pre op hemoglobin of 10.5 -> 9.4. repeat hgb in AM  - analgesia PRN, is sore but no uncontrolled pain with current regimen: scheduled tylenol, ice, elevate, oxycodone PRN switched to dilaudid with better pain control, IV Dilaudid PRN, will resume pta gabapentin dose  - stool softeners PRN  - DVT ppx per ortho     Subacute Rib Fracture   Hx of Closed Right Rib Fracture   Previous rib fractures seen on chest x-ray 1/2023, lateral - not well visualized.  X-ray rib series/chest repeated due to exacerbated rib pain with clear fracture involving the lateral right sixth rib.  Suspect this is not a new fracture but likely previously documented fracture although pain could be uncontrolled from refracture with fall.  -Currently receiving acetaminophen 975 mg 3 times daily, gabapentin 600 mg twice daily, IV Dilaudid 0.2 mg every 3 hours as needed, prn dilaudid  -monitor spo2  -ok to resume cpap    Episode of vision change  Reported episode of double vision p.m. of 2/12. Resolved  "without intervention.  It is possible this was related to medications.  Overnight provider was notified, no change in neurologic exam per RN. Did not have CT scan repeated of head.  -Patient feels this was related to dry eyes and is requesting scheduled eyedrops, takes these at home  -If symptoms recur low threshold for CT scan head to evaluate for vascular abnormality, lower suspicion for TIA/CVA would not anticipate exact symptoms would recur if this was the case  -Monitor, has not recurred     Rheumatoid arthritis  -PTA methotrexate once weekly on Thursday and resume updacitinib      Osteoporosis   - Zoledronic acid next dose due March 2023     Incidental left thyroid nodule  -Recommend outpatient thyroid ultrasound       Diet: Regular Diet Adult    DVT Prophylaxis: Pneumatic Compression Devices  Nichols Catheter: Not present  Lines: None     Cardiac Monitoring: None  Code Status: No CPR- Do NOT Intubate      Clinically Significant Risk Factors                        # Obesity: Estimated body mass index is 33.66 kg/m  as calculated from the following:    Height as of this encounter: 1.664 m (5' 5.5\").    Weight as of 1/16/23: 93.2 kg (205 lb 6.4 oz)., PRESENT ON ADMISSION         Disposition Plan      Expected Discharge Date: 02/15/2023      Destination: inpatient rehabilitation facility  Discharge Comments: Once TCU bed available, pending therapy evals        The patient's care was discussed with the Chief Resident/Fellow, Bedside Nurse, Care Coordinator/ and Patient's Family.    Mahogany Barney PA-C  Hospitalist Service  [facility]  Securely message with [employer] (more info)  Text page via [employer] Paging/Directory   ______________________________________________________________________    Interval History   Overnight developed right rib pain worse since fall.  Called for uncontrolled pain, oxycodone did not help her previously, which to Dilaudid with improved pain control.  No " excessive drowsiness.  She reports difficulty taking deep breaths due to some splinting from pain in lower ribs but otherwise denies shortness of breath.  No cough.  No fever.  Tolerating normal diet.  Last bm not recorded.  Up out of bed with assistance today.  No double vision, headache, speech changes.    Physical Exam   Vital Signs: Temp: 98.6  F (37  C) Temp src: Oral BP: 135/60 Pulse: 68   Resp: 16 SpO2: 90 % O2 Device: BiPAP/CPAP (has cpap on since pt has been sleeping)    Weight: 0 lbs 0 oz    Constitutional: Awake, alert, no apparent distress  Respiratory:  Normal work of breathing. Lungs clear to auscultation bilaterally, no crackles or wheezing.  Cardiovascular: Regular rate and rhythm, normal S1 and S2, and no murmur appreciated.   GI: Bowel sounds present. soft, non-distended, non-tender.   Skin/Integument: Warm, dry. no peripheral edema.  Musculoskeletal reproducible pain over the right anterior lateral ribs. Both upper extremities are splinted.  Capillary refill and and sensation intact in both fingertips.  Neuro: No focal deficits. Moving all extremities with normal strength. Coordination and sensation grossly intact. Speech clear. No focal deficits.   Psych: Appropriate affect.      Medical Decision Making       55 MINUTES SPENT BY ME on the date of service doing chart review, history, exam, documentation & further activities per the note.      Data   ------------------------- PAST 24 HR DATA REVIEWED -----------------------------------------------[id number]}

## 2023-02-14 NOTE — PROGRESS NOTES
Progress Note - asked by nursing to assess patient with right rib pain.  Patient reports since her fall, she has a right anterior chest wall pain worse with movement.  She has a known fracture of the lower 7th right rib found in 1/2023 but reports that pain was more posterior.   Pain is reproducible over the lower right anterior ribs.  No chest imaging was obtained on admission.  - will order a CXR with right rib series.    Carolyn Harrington MS, PA-C  Hospitalist Service  Pager [phone]

## 2023-02-14 NOTE — PROVIDER NOTIFICATION
Paged X cover for order for IV in foot for IV pain meds. Unable to place in BUE d/t bilateral splints.

## 2023-02-14 NOTE — PROGRESS NOTES
Care Management Follow Up    Expected Discharge Date: 02/15/2023     Concerns to be Addressed: Discharge planning     Patient plan of care discussed at interdisciplinary rounds: Yes    [facility]     Anticipated Discharge Services:  PT/OT    Patient/family educated on Medicare website which has current facility and service quality ratings: Yes  Education Provided on the Discharge Plan: Yes   Patient/Family in Agreement with the Plan: Yes    Referrals Placed by CM/SW: Post Acute Facilities  Private pay costs discussed: Not applicable    Additional Information:  [facility] and [facility] have clinically accepted patient, bed available tomorrow. DIANNE spoke with pt's daughter Jamila via phone. Pt/dtr prefer [facility]. Dtr can transport at 1700 tomorrow. [id number]. DIANNE will continue to follow.     RAUL Fall, [facility]   Inpatient Care Coordination  [facility]   [phone]

## 2023-02-14 NOTE — PLAN OF CARE
Vital signs:  Temp: 98.6  F (37  C) Temp src: Oral BP: 135/60 Pulse: 68   Resp: 16 SpO2: 90 %  Cardiac: WDL  Resp: X; when deep breathing hard due to pain. LS clear. Had CPAP on when napping.  Neuro: A&Ox4, CMS in tact on upper arms.  GI: X; given senna no BM, passing gas  : WDL  Skin: Scattered bruising from fall  Activity: Up Ax1 with platform walker  Diet: Reg  Pain: PO Dilaudid 4 mg x1; was sleeping between cares after this dose. Scheduled tylenol and lidocaine patch.  2 mg Dilaudid given   Plan: TCU possible tomorrow for EBC     abdomen

## 2023-02-14 NOTE — PROGRESS NOTES
Patient has had increased right anterior chest wall pain worse with movement since her fall.  Previous rib fractures seen on chest x-ray 1/2023 although it was quite lateral and difficult to say exactly which rib, presumably seventh rib per radiology.  X-ray rib series/chest now shows a clear fracture involving the lateral right sixth rib.  I think this is likely the same location of the previous documented fracture although better quality imaging here with the rib series and it looks displaced and certainly could account for her pain.  Possibly refractured with this fall.  No other abnormalities on x-ray.  -Currently receiving acetaminophen 975 mg 3 times daily, gabapentin 600 mg twice daily, IV Dilaudid 0.2 mg every 3 hours as needed, and oxycodone 2.5-5 mg every 4 hours as needed  -Add lidocaine patch to right chest wall pain.  Allergy tabs says rash from plastic adhesive tape some monitor for any reaction.

## 2023-02-14 NOTE — PROGRESS NOTES
Cross cover notified of patient with uncontrolled pain.  Here with bilateral arm fractures s/p bilateral ORIF.  Is on multimodal pain regimen at this time.  Has been receiving oxycodone 5 mg overnight which she says is not really helping with pain control when she does get it.  Does not have IV in due to the bilateral arm splints.  I do not recommend a lower extremity IV at this time due to infection risk in the setting of new hardware in both arms.  -Discontinue oxycodone  -Start oral Dilaudid 2 mg every 3 hours as needed for moderate pain and 4 mg every 3 hours as needed for severe pain, received oxycodone recently so start with 2 mg Dilaudid dose and monitor for any sedation

## 2023-02-15 ENCOUNTER — LAB REQUISITION (OUTPATIENT)
Dept: LAB | Facility: CLINIC | Age: 83
End: 2023-02-15
Payer: COMMERCIAL

## 2023-02-15 VITALS
BODY MASS INDEX: 33.66 KG/M2 | OXYGEN SATURATION: 93 % | RESPIRATION RATE: 16 BRPM | SYSTOLIC BLOOD PRESSURE: 137 MMHG | DIASTOLIC BLOOD PRESSURE: 76 MMHG | TEMPERATURE: 99.3 F | HEART RATE: 74 BPM | HEIGHT: 66 IN

## 2023-02-15 DIAGNOSIS — U07.1 COVID-19: ICD-10-CM

## 2023-02-15 LAB — HGB BLD-MCNC: 9.1 G/DL (ref 11.7–15.7)

## 2023-02-15 PROCEDURE — 85018 HEMOGLOBIN: CPT | Performed by: PHYSICIAN ASSISTANT

## 2023-02-15 PROCEDURE — 250N000013 HC RX MED GY IP 250 OP 250 PS 637: Performed by: PHYSICIAN ASSISTANT

## 2023-02-15 PROCEDURE — 250N000013 HC RX MED GY IP 250 OP 250 PS 637: Performed by: ORTHOPAEDIC SURGERY

## 2023-02-15 PROCEDURE — 250N000013 HC RX MED GY IP 250 OP 250 PS 637: Performed by: INTERNAL MEDICINE

## 2023-02-15 PROCEDURE — 99239 HOSP IP/OBS DSCHRG MGMT >30: CPT | Performed by: PHYSICIAN ASSISTANT

## 2023-02-15 PROCEDURE — U0005 INFEC AGEN DETEC AMPLI PROBE: HCPCS | Performed by: NURSE PRACTITIONER

## 2023-02-15 PROCEDURE — 36415 COLL VENOUS BLD VENIPUNCTURE: CPT | Performed by: PHYSICIAN ASSISTANT

## 2023-02-15 RX ORDER — CARBOXYMETHYLCELLULOSE SODIUM 5 MG/ML
1 SOLUTION/ DROPS OPHTHALMIC 2 TIMES DAILY PRN
DISCHARGE
Start: 2023-02-15

## 2023-02-15 RX ORDER — AMOXICILLIN 250 MG
1 CAPSULE ORAL 2 TIMES DAILY
DISCHARGE
Start: 2023-02-15 | End: 2023-03-14

## 2023-02-15 RX ORDER — ATORVASTATIN CALCIUM 40 MG/1
40 TABLET, FILM COATED ORAL DAILY
Qty: 90 TABLET | Refills: 3 | DISCHARGE
Start: 2023-02-15 | End: 2023-05-08

## 2023-02-15 RX ORDER — GUAIFENESIN 600 MG/1
600 TABLET, EXTENDED RELEASE ORAL 2 TIMES DAILY
Qty: 20 TABLET | Refills: 0 | DISCHARGE
Start: 2023-02-15 | End: 2024-02-29

## 2023-02-15 RX ORDER — GABAPENTIN 300 MG/1
600 CAPSULE ORAL 2 TIMES DAILY
DISCHARGE
Start: 2023-02-15 | End: 2023-06-26

## 2023-02-15 RX ORDER — LIDOCAINE 4 G/G
1 PATCH TOPICAL EVERY 24 HOURS
DISCHARGE
Start: 2023-02-15 | End: 2023-06-26

## 2023-02-15 RX ORDER — HYDROMORPHONE HYDROCHLORIDE 2 MG/1
2 TABLET ORAL
Qty: 10 TABLET | Refills: 0 | Status: SHIPPED | OUTPATIENT
Start: 2023-02-15 | End: 2023-02-16

## 2023-02-15 RX ORDER — LORATADINE 10 MG/1
10 TABLET ORAL 2 TIMES DAILY
DISCHARGE
Start: 2023-02-15

## 2023-02-15 RX ORDER — LEUCOVORIN CALCIUM 5 MG/1
5 TABLET ORAL
Qty: 13 TABLET | Refills: 2 | DISCHARGE
Start: 2023-02-17 | End: 2023-08-25

## 2023-02-15 RX ORDER — ZINC GLUCONATE 50 MG
50 TABLET ORAL DAILY
DISCHARGE
Start: 2023-02-15 | End: 2024-02-21

## 2023-02-15 RX ORDER — UPADACITINIB 15 MG/1
15 TABLET, EXTENDED RELEASE ORAL DAILY
Qty: 30 TABLET | Refills: 6 | DISCHARGE
Start: 2023-02-15 | End: 2023-08-25

## 2023-02-15 RX ORDER — ACETAMINOPHEN 325 MG/1
975 TABLET ORAL EVERY 8 HOURS
DISCHARGE
Start: 2023-02-15 | End: 2023-02-28

## 2023-02-15 RX ORDER — BUSPIRONE HYDROCHLORIDE 10 MG/1
10 TABLET ORAL 2 TIMES DAILY
Qty: 180 TABLET | Refills: 1 | DISCHARGE
Start: 2023-02-15 | End: 2023-06-05

## 2023-02-15 RX ORDER — IPRATROPIUM BROMIDE 42 UG/1
SPRAY, METERED NASAL
Qty: 15 ML | Refills: 1 | DISCHARGE
Start: 2023-02-15 | End: 2024-02-21 | Stop reason: ALTCHOICE

## 2023-02-15 RX ORDER — NAPROXEN SODIUM 220 MG
440 TABLET ORAL DAILY PRN
DISCHARGE
Start: 2023-02-15 | End: 2024-08-06

## 2023-02-15 RX ORDER — SUCRALFATE 1 G/1
1 TABLET ORAL 4 TIMES DAILY PRN
DISCHARGE
Start: 2023-02-15 | End: 2024-02-21

## 2023-02-15 RX ORDER — MULTIVIT WITH MINERALS/LUTEIN
1000 TABLET ORAL DAILY
DISCHARGE
Start: 2023-02-15

## 2023-02-15 RX ORDER — METHOTREXATE 25 MG/.5ML
25 INJECTION, SOLUTION SUBCUTANEOUS
DISCHARGE
Start: 2023-02-16 | End: 2023-05-09

## 2023-02-15 RX ORDER — HYDROCORTISONE 25 MG/G
CREAM TOPICAL 2 TIMES DAILY PRN
Qty: 30 G | Refills: 1 | DISCHARGE
Start: 2023-02-15 | End: 2023-02-21

## 2023-02-15 RX ORDER — DESVENLAFAXINE 100 MG/1
100 TABLET, EXTENDED RELEASE ORAL DAILY
Qty: 90 TABLET | Refills: 1 | DISCHARGE
Start: 2023-02-15 | End: 2023-06-05

## 2023-02-15 RX ORDER — PANTOPRAZOLE SODIUM 40 MG/1
TABLET, DELAYED RELEASE ORAL
Qty: 90 TABLET | Refills: 1 | DISCHARGE
Start: 2023-02-15 | End: 2023-04-13

## 2023-02-15 RX ORDER — ALBUTEROL SULFATE 90 UG/1
2 AEROSOL, METERED RESPIRATORY (INHALATION) EVERY 6 HOURS PRN
DISCHARGE
Start: 2023-02-15

## 2023-02-15 RX ADMIN — BUSPIRONE HYDROCHLORIDE 10 MG: 10 TABLET ORAL at 08:26

## 2023-02-15 RX ADMIN — GUAIFENESIN 600 MG: 600 TABLET, EXTENDED RELEASE ORAL at 08:26

## 2023-02-15 RX ADMIN — LORATADINE 10 MG: 10 TABLET ORAL at 08:25

## 2023-02-15 RX ADMIN — ACETAMINOPHEN 975 MG: 325 TABLET ORAL at 06:48

## 2023-02-15 RX ADMIN — ACETAMINOPHEN 975 MG: 325 TABLET ORAL at 12:39

## 2023-02-15 RX ADMIN — Medication 1 DROP: at 08:27

## 2023-02-15 RX ADMIN — DESVENLAFAXINE SUCCINATE 100 MG: 100 TABLET, FILM COATED, EXTENDED RELEASE ORAL at 08:26

## 2023-02-15 RX ADMIN — GABAPENTIN 600 MG: 300 CAPSULE ORAL at 08:26

## 2023-02-15 RX ADMIN — SENNOSIDES AND DOCUSATE SODIUM 2 TABLET: 50; 8.6 TABLET ORAL at 08:42

## 2023-02-15 RX ADMIN — ATORVASTATIN CALCIUM 40 MG: 40 TABLET, FILM COATED ORAL at 08:26

## 2023-02-15 RX ADMIN — FAMOTIDINE 20 MG: 20 TABLET, FILM COATED ORAL at 08:26

## 2023-02-15 RX ADMIN — HYDROMORPHONE HYDROCHLORIDE 2 MG: 2 TABLET ORAL at 08:44

## 2023-02-15 ASSESSMENT — ACTIVITIES OF DAILY LIVING (ADL)
ADLS_ACUITY_SCORE: 30
ADLS_ACUITY_SCORE: 32

## 2023-02-15 NOTE — PLAN OF CARE
"Care from [id number]    Inpatient Progress Note:  For complete assessment see flow sheet documentation.  Vital signs:  Temp: 98.9  F (37.2  C) Temp src: Oral BP: 123/83 Pulse: 86   Resp: 18 SpO2: 91 % O2 Device: None (Room air) Oxygen Delivery: 2 LPM Height: 166.4 cm (5' 5.5\")    Estimated body mass index is 33.66 kg/m  as calculated from the following:    Height as of this encounter: 1.664 m (5' 5.5\").    Weight as of 1/16/23: 93.2 kg (205 lb 6.4 oz).    Orientation: A&O x 4  Neuro: WNL, CMS intact BUE's.  Pain status: Very mild right rib pain if not moving right arm. When ambulating or moving arm pain 6/10. Scheduled meds given. No PRN meds needed. Lidocaine patch in place over right rib area.  Activity: Ax1 with platform walker/gait belt.  Peripheral edema:  Trace in fingertips  Resp: Right rib pain. Hard to take deep breath due to pain. No complaints of SOB. Encouraged deep breath with movement instead of holding breathe.  Cardiac: WNL  GI: WNL  : WNL   Skin: Bruising on face  LDA: No PIV access  Diet: Regular  Consults: PT/OT, Ortho, SW following  Discharge Plan: Discharge to [facility] today. Daughter to transport at 1700.    Will continue to monitor and provide cares.     Dahiana Araujo RN            "

## 2023-02-15 NOTE — PROGRESS NOTES
Care Management Discharge Note    Discharge Date: 02/15/2023     Discharge Disposition: [facility]itional Care    Discharge Services:  PT/OT    Discharge Transportation: Daughter to transport at 1700    Private pay costs discussed: Not applicable    PAS Confirmation Code:  [id number]  Patient/family educated on Medicare website which has current facility and service quality ratings: Yes    Education Provided on the Discharge Plan:  Yes  Persons Notified of Discharge Plans[facility]  Patient/Family in Agreement with the Plan: yes    Handoff Referral Completed: Yes    Additional Information:  Orders faxed to [facility]. Daughter to transport at 1700. Facility updated on transport time. SW will remain available for any further needs.     RAUL Fall, [facility]   Inpatient Care Coordination  [facility]   [phone]

## 2023-02-15 NOTE — DISCHARGE SUMMARY
[facility] Discharge Summary          Ginger Marshall MRN# [id number]   Age: 82 year old YOB: 1940     Date of Admission:  2/11/2023  Date of Discharge::  2/15/2023  Admitting Physician:  Diego Vargas, DO  Discharge Physician:  Carolyn Harrington PA-C  Primary Physician: Duy Leyva     Primary Discharge Diagnoses:   Mechanical Fall  Right elbow and left distal radial, ulnar fracture s/p ORIF Left distal radius fracture, ORIF Right olecranon fracture.   Closed Head Injury/Facial Injury  Post Operative Anemia, mild     Hospital Course:   For detail history, please refer to H & P from 2/11/2023. In brief, this is a 82 year old female admitted on 2/11 with PMH history of right rib fracture, depression, anxiety, GERD, rheumatoid arthritis, osteoporosis, who was admitted on 2/11/2023 for mechanical fall with right elbow and left distal radial, ulnar fracture.     She fell while walking her dog. Admitted to observation. Taken to the OR 2/12, s/p ORIF Left distal radius fracture, ORIF Right olecranon fracture.      Mechanical Fall  Complex right elbow fracture due to fall  Complex left distal radial and ulnar fracture   S/p ORIF left distal radius and right olecranon fracture  Closed Head Injury/Facial Injury  Post Operative Anemia, mild  - Able to use left arm to flex to mouth for ADLs and may weight bear through forearms for ambulation as needed.  - PT/OT/SW consulted and patient was discharged to TCU  - monitor hgb, pre op hemoglobin of 10.5 -> 9.1     Subacute Rib Fracture   Hx of Closed Right Rib Fracture   Previous rib fractures seen on chest x-ray 1/2023, lateral - not well visualized.  X-ray rib series/chest repeated due to exacerbated rib pain with clear fracture involving the lateral right sixth rib.  Suspect this is not a new fracture but likely previously documented fracture although pain could be uncontrolled from refracture with fall.    Rheumatoid arthritis  -PTA methotrexate  once weekly on Thursday and resume updacitinib      Osteoporosis   - Zoledronic acid next dose due March 2023     Incidental left thyroid nodule  -Recommend outpatient thyroid ultrasound    Procedures/Imaging:     Results for orders placed or performed during the hospital encounter of 02/11/23   Head CT w/o contrast    Narrative    EXAM: CT HEAD WITHOUT CONTRAST  LOCATION: [facility]  DATE/TIME: 02/11/2023, 9:02 AM    INDICATION: Fall, trauma.  COMPARISON: 10/06/2015.  TECHNIQUE: Routine CT Head without IV contrast. Multiplanar reformats. Dose reduction techniques were used.    FINDINGS:  INTRACRANIAL CONTENTS: No intracranial hemorrhage, extra-axial collection, or mass effect.  No CT evidence of acute infarct. Moderate presumed chronic small vessel ischemic changes. Mild to moderate generalized volume loss. No hydrocephalus.     VISUALIZED ORBITS/SINUSES/MASTOIDS: Prior bilateral cataract surgery. Visualized portions of the orbits are otherwise unremarkable. Trace scattered mucosal thickening in the paranasal sinuses. No middle ear or mastoid effusion.    BONES/SOFT TISSUES: No acute abnormality. Left greater than right temporomandibular joint degenerative changes. Ovoid soft tissue nodule again seen in the subcutaneous tissues overlying the midline occipital bone measuring 14 mm with small internal   coarse calcification. This is nonspecific, but may represent a sebaceous cyst/epidermal inclusion cyst.      Impression    IMPRESSION:  1.  No CT evidence for acute intracranial process.  2.  Brain atrophy and presumed chronic microvascular ischemic changes as above.     Cervical spine CT w/o contrast    Narrative    EXAM: CT CERVICAL SPINE WITHOUT CONTRAST  LOCATION: [facility]  DATE/TIME: 02/11/2023, 9:02 AM    INDICATION: Fall, trauma.  COMPARISON: None.  TECHNIQUE: Routine CT Cervical Spine without IV contrast. Multiplanar reformats. Dose reduction techniques were  used.    FINDINGS:  VERTEBRA: No acute cervical spine fracture is identified. Normal vertebral body heights. Anterolisthesis of C3 on C4 measuring 2-3 mm. Anterolisthesis of C4 on C5 measuring 1 mm. Anterolisthesis of C7 on T1 measuring 2-3 mm. Anterolisthesis of T1 on T2   measuring 1 mm. Mild levoconvex curvature of the cervicothoracic spine with apex at the cervicothoracic junction.    CANAL/FORAMINA: Mild multilevel disc height loss. Marginal endplate osteophytes. Scattered mild/moderate multilevel degenerative facet disease. Mild to moderate degenerative change at the median atlantoaxial joint. No high-grade central spinal canal   stenosis is identified. No high-grade osseous neural foraminal stenosis.    PARASPINAL: Hypodense left-sided thyroid nodule measuring up to approximately 23 mm craniocaudal (series 11 image 4). The visualized paraspinous soft tissues otherwise appear grossly unremarkable.      Impression    IMPRESSION:  1.  No acute fracture.  2.  Multilevel spondylolisthesis, as described.  3.  Degenerative changes, as described. No high-grade spinal canal or neural foraminal stenosis.  4.  Left thyroid nodule measuring over 20 mm. Follow-up thyroid ultrasound is suggested.     CT Facial Bones without Contrast    Narrative    EXAM: CT FACIAL BONES WITHOUT CONTRAST  LOCATION: [facility]  DATE/TIME: 02/11/2023, 9:00 AM    INDICATION: Facial trauma.  COMPARISON: CT head same day.  TECHNIQUE: Routine CT Maxillofacial without IV contrast. Multiplanar reformats. Dose reduction techniques were used.     FINDINGS: The pterygoid plates are intact. The bilateral zygomatic arches, sphenotemporal buttresses, the walls of both orbits, and the walls of the maxillary sinuses appear intact. No displaced nasal arch or nasal septal fracture identified. The   anterior skull base appears intact. The mandible appears intact. The temporomandibular joints are normally located.    Bilateral lens  implants. The visualized intraorbital soft tissues otherwise appear grossly unremarkable. There may be some mild swelling overlying the nasal arch. There may be some mild soft tissue swelling along the upper anterior aspect of the nasal   septum (series 4 image 34). Recommend clinical correlation.    Mild scattered mucosal thickening in the paranasal sinuses without air-fluid levels. The visualized aspects of the mastoid and middle ear cavities are clear. Bilateral temporomandibular joint degenerative changes are seen. Please see separate report from   head CT of same day for details regarding intracranial findings. Partially visualized degenerative changes in the cervical spine.      Impression    IMPRESSION:  1.  No acute displaced maxillofacial fracture identified.  2.  Possible soft tissue swelling overlying the nasal arch. Possible soft tissue swelling along the anterior aspect of the nasal septum. Please correlate clinically.     Elbow XR, G/E 3 views, right    Narrative    EXAM: XR ELBOW RIGHT G/E 3 VIEWS  LOCATION: [facility]  DATE/TIME: 2/11/2023 9:13 AM    INDICATION: R ELBOW PAIN  COMPARISON: None.      Impression    IMPRESSION: Complex, comminuted fracture of the olecranon and proximal ulna where there is intra-articular involvement and fracture fragment distraction up to 2.3 cm. Soft tissue swelling and a complex right elbow joint effusion.         NOTE: ABNORMAL REPORT    THE DICTATION ABOVE DESCRIBES AN ABNORMALITY FOR WHICH FOLLOW-UP IS NEEDED.    Radius/Ulna XR,  PA &LAT, left    Narrative    EXAM: XR FOREARM LEFT 2 VIEWS, XR WRIST LEFT 2 VIEWS  LOCATION: [facility]  DATE/TIME: 2/11/2023 9:13 AM    INDICATION: arm pain  COMPARISON: None.      Impression    IMPRESSION: Complex, comminuted and impacted intra-articular distal radius fracture with displacement and dorsal angulation. Minimally displaced oblique fracture of the distal ulnar shaft.     The more  proximal radius and ulna of the forearm are intact. Bones are demineralized. There is mild degenerative change about the elbow with some cortical irregularity of the radial head which is favored to be degenerative, however there is concern for   an acute left elbow fracture, recommend dedicated left elbow radiographs.    NOTE: ABNORMAL REPORT    THE DICTATION ABOVE DESCRIBES AN ABNORMALITY FOR WHICH FOLLOW-UP IS NEEDED.       XR Wrist Left 2 Views    Narrative    EXAM: XR FOREARM LEFT 2 VIEWS, XR WRIST LEFT 2 VIEWS  LOCATION: [facility]  DATE/TIME: 2/11/2023 9:13 AM    INDICATION: arm pain  COMPARISON: None.      Impression    IMPRESSION: Complex, comminuted and impacted intra-articular distal radius fracture with displacement and dorsal angulation. Minimally displaced oblique fracture of the distal ulnar shaft.     The more proximal radius and ulna of the forearm are intact. Bones are demineralized. There is mild degenerative change about the elbow with some cortical irregularity of the radial head which is favored to be degenerative, however there is concern for   an acute left elbow fracture, recommend dedicated left elbow radiographs.    NOTE: ABNORMAL REPORT    THE DICTATION ABOVE DESCRIBES AN ABNORMALITY FOR WHICH FOLLOW-UP IS NEEDED.       XR Wrist Port Left 2 Views    Narrative    EXAM: XR WRIST PORT LEFT 2 VIEWS  LOCATION: [facility]  DATE/TIME: 2/11/2023 11:42 AM    INDICATION: Post reduction  COMPARISON: 02/11/2023      Impression    IMPRESSION: Comminuted fracture of the distal radius with dorsal and radial displacement and dorsal angulation of the distal radial fragments. Oblique fracture of the distal ulnar diaphysis. Splint is been placed. Osteopenia. Degenerative changes in the   wrist and hand. Capitohamate coalition.   CT Wrist Left w/o Contrast    Narrative    [facility]  CT OF THE LEFT WRIST.  02/11/2023, 2:07 PM    INDICATION:  Left wrist fracture.  TECHNIQUE: Noncontrast. Axial, sagittal and coronal thin-section reconstruction. Dose reduction techniques were used.  COMPARISON: Same-day radiograph.    FINDINGS: Again seen is a complex comminuted impacted and displaced intra-articular distal radius fracture. Fine bony detail is limited due to patient positioning, artifact and bone demineralization. There is a moderate amount of dorsal angulation along   the radial articular surface.    Mild displaced oblique fracture of the distal ulnar shaft with up to 4 mm of displacement. There is no definitive evidence of an additional fracture about the wrist. Scattered arthritic changes are again seen.      Impression    IMPRESSION:  1.  Comminuted, impacted and displaced intra-articular distal radius fracture again seen.  2.  Mildly displaced distal ulnar shaft fracture.  3.  No definitive evidence of an additional fracture about the wrist.  4.  Fine bony detail is limited due to patient positioning, artifact and bone demineralization.     CT Elbow Right w/o Contrast    Narrative    EXAM: CT ELBOW RIGHT W/O CONTRAST  LOCATION: [facility]  DATE/TIME: 2/11/2023 2:02 PM    INDICATION: r elbow frx  COMPARISON: Same-day radiograph.  TECHNIQUE: Noncontrast. Axial, sagittal and coronal thin-section reconstruction. Dose reduction techniques were used.     FINDINGS:   Patient positioning, bone demineralization and artifact limits fine bony detail.     Again seen is a complex comminuted intra-articular ulnar fracture with up to 2.3 cm of fracture fragment distraction. There is a complex joint effusion and surrounding soft tissue swelling.    There is no evidence of a displaced fracture elsewhere. There is mild degenerative change at the radiocapitellar articulation without definitive evidence of a radial head fracture.    No dislocation.      Impression    IMPRESSION:  1.  Complex comminuted intra-articular ulnar fracture with up to 2.3  "cm of fracture fragment distraction.  2.  No definitive evidence of an additional fracture about the elbow.  3.  Bones are demineralized.     XR Surgery NIESHA L/T 5 Min Fluoro w Stills    Narrative    This exam was marked as non-reportable because it will not be read by a   radiologist or a [place] non-radiologist provider.         XR Surgery NIESHA L/T 5 Min Fluoro w Stills    Narrative    This exam was marked as non-reportable because it will not be read by a   radiologist or a [place] non-radiologist provider.         XR Ribs & Chest Right G/E 3 Views    Narrative    EXAM: XR RIBS and CHEST RT 3VW  LOCATION: [facility]  DATE/TIME: 2/14/2023 12:11 AM    INDICATION: s p fall.  reproducible right anterior rib pain.  COMPARISON: 1/8/2023      Impression    IMPRESSION: There is a mildly displaced fracture of the right sixth rib. The thorax is appreciated on the current exam. Left basilar discoid atelectasis is present.     *Note: Due to a large number of results and/or encounters for the requested time period, some results have not been displayed. A complete set of results can be found in Results Review.     Allergies:     Allergies   Allergen Reactions     Abatacept Other (See Comments)     Severe headaches  Migraine     Celebrex [Celecoxib]      Ineffective     Celecoxib Unknown and Nausea     Levaquin [Levofloxacin] Fatigue     Severe body pain     Orencia [Abatacept]      Headache     Septra [Bactrim]      Sulfa Drugs Nausea and Vomiting     \"deathly ill\"  Allergic to everything with Sulfa in it.     Sulfamethoxazole-Trimethoprim Nausea and Nausea and Vomiting     Tramadol Other (See Comments)     Headache  Migraine headache     Valdecoxib Unknown and Nausea     Made me \"very very ill\", might of been \"cramping\"     Adhesive Tape Rash     Plastic tape  Plastic tapes     Antihistamines, Chlorpheniramine-Type  [Alkylamines] Anxiety and Other (See Comments)        Subjective:   Pain improved " "today.  Still with reports difficulty taking deep breaths due to some splinting from pain in lower ribs but otherwise denies shortness of breath.  No cough.     Physical Exam:   Blood pressure (!) 145/68, pulse 83, temperature 98.5  F (36.9  C), temperature source Oral, resp. rate 16, height 1.664 m (5' 5.5\"), SpO2 92 %, not currently breastfeeding.  General: Alert, interactive, NAD  HEENT: AT/NC  Resp: clear to auscultation bilaterally, no crackles or wheezes  Cardiac: regular rate and rhythm, no murmur.  Reproducible pain over the right anterior/lateral ribs.   Abdomen: Soft, nontender, nondistended. +BS.  Extremities: BUE splinted.  Skin: Warm and dry  Neuro: Alert & oriented x 3   Discharge Medicatios:        Discharge Medication List as of 2/15/2023  8:04 PM      START taking these medications    Details   acetaminophen (TYLENOL) 325 MG tablet Take 3 tablets (975 mg) by mouth every 8 hours, Transitional      carboxymethylcellulose PF (REFRESH PLUS) 0.5 % ophthalmic solution Place 1 drop into both eyes 2 times daily as needed for dry eyes, Transitional      senna-docusate (SENOKOT-S/PERICOLACE) 8.6-50 MG tablet Take 1 tablet by mouth 2 times daily, Transitional      HYDROmorphone (DILAUDID) 2 MG tablet Take 1 tablet (2 mg) by mouth every 3 hours as needed for moderate pain (4-6), Disp-10 tablet, R-0, Local Print         CONTINUE these medications which have CHANGED    Details   albuterol (PROAIR HFA/PROVENTIL HFA/VENTOLIN HFA) 108 (90 Base) MCG/ACT inhaler Inhale 2 puffs into the lungs every 6 hours as needed for shortness of breath, wheezing or cough, TransitionalPharmacy may dispense brand covered by insurance (Proair, or proventil or ventolin or generic albuterol inhaler)      atorvastatin (LIPITOR) 40 MG tablet Take 1 tablet (40 mg) by mouth daily, Disp-90 tablet, R-3, Transitional      busPIRone (BUSPAR) 10 MG tablet Take 1 tablet (10 mg) by mouth 2 times daily, Disp-180 tablet, R-1, Transitional    "   desvenlafaxine (PRISTIQ) 100 MG 24 hr tablet Take 1 tablet (100 mg) by mouth daily, Disp-90 tablet, R-1, Transitional      gabapentin (NEURONTIN) 300 MG capsule Take 2 capsules (600 mg) by mouth 2 times daily, Transitional      guaiFENesin (MUCINEX) 600 MG 12 hr tablet Take 1 tablet (600 mg) by mouth 2 times daily, Disp-20 tablet, R-0, Transitional      hydrocortisone, Perianal, (HYDROCORTISONE) 2.5 % cream Place rectally 2 times daily as needed for hemorrhoidsDisp-30 g, R-1Transitional      ipratropium (ATROVENT) 0.06 % nasal spray 2 sprays in each nostril, 2-3 times per day as needed for rhinitis, Disp-15 mL, R-1, Transitional      leucovorin (WELLCOVORIN) 5 MG tablet Take 1 tablet (5 mg) by mouth every 7 days . Take 24 hours after taking Methotrexate each week., Disp-13 tablet, R-2, Transitional      Lidocaine (LIDOCARE) 4 % Patch Place 1 patch onto the skin every 24 hours To prevent lidocaine toxicity, patient should be patch free for 12 hrs daily.Transitional      loratadine (CLARITIN) 10 MG tablet Take 1 tablet (10 mg) by mouth 2 times daily, Transitional      Methotrexate, PF, (RASUVO) 25 MG/0.5ML autoinjector Inject 0.5 mLs (25 mg) Subcutaneous every 7 days . Hold for signs of infection, and seek medical attention. Take every Thursday., Transitional      naproxen sodium (ANAPROX) 220 MG tablet Take 2 tablets (440 mg) by mouth daily as needed for moderate pain (4-6) (less than monthly use currently), Transitional      pantoprazole (PROTONIX) 40 MG EC tablet TAKE ONE TABLET BY MOUTH ONCE DAILY 30-60 MINUTES BEFORE A MEAL, Disp-90 tablet, R-1, Transitional      sucralfate (CARAFATE) 1 GM tablet Take 1 tablet (1 g) by mouth 4 times daily as needed for nausea (reflux) TAKE ONE TABLET BY MOUTH FOUR TIMES A DAY AS NEEDED HEARTBURN, Transitional      Upadacitinib ER (RINVOQ) 15 MG TB24 Take 15 mg by mouth daily, Disp-30 tablet, R-6, Transitional      vitamin C (ASCORBIC ACID) 1000 MG TABS Take 1 tablet (1,000 mg)  by mouth daily, Transitional      zinc gluconate 50 MG tablet Take 1 tablet (50 mg) by mouth daily, Transitional         CONTINUE these medications which have NOT CHANGED    Details   CALCIUM CITRATE PO Take 300 mg by mouth daily Take with meal that contains least amount of calcium, Historical      Cholecalciferol (VITAMIN D-3 PO) Take 5,000 Units by mouth Take every 5 days, Historical      triamcinolone (KENALOG) 0.1 % external ointment Apply 1 applicator topically daily Apply to backHistorical      zoledronic Acid (RECLAST) 5 MG/100ML SOLN infusion Inject 5 mg into the vein once Every year (next dose March 2023), Historical         STOP taking these medications       acetaminophen (TYLENOL) 650 MG CR tablet Comments:   Reason for Stopping:         Polyethyl Glycol-Propyl Glycol (SYSTANE OP) Comments:   Reason for Stopping:               Instructions Given to Patient as Discharge:     Discharge Procedure Orders   Primary Care - Care Coordination Referral   Standing Status: Future   Referral Priority: Routine: Next available opening Referral Type: Care Coordination   Number of Visits Requested: 1     General info for SNF   Order Comments: Length of Stay Estimate: Short Term Care: Estimated # of Days <30  Condition at Discharge: Stable  Level of care:skilled   Rehabilitation Potential: Good  Admission H&P remains valid and up-to-date: Yes  Recent Chemotherapy: N/A  Use Nursing Home Standing Orders: Yes     Mantoux instructions   Order Comments: Give two-step Mantoux (PPD) Per Facility Policy Yes     Follow Up and recommended labs and tests   Order Comments: Follow up with [facility] in clinic.  Please call to scheduled your follow up appointment     Reason for your hospital stay   Order Comments: You were hospitalized after a fall which resulted in arm fractures.  Orthopedic Surgery was consulted and you underwent an open reduction and internal fixation of a right olecranon fracture and open reduction internal fixation of a  left distal radius fracture     Activity - Up with nursing assistance   Order Comments: - Ice and elevate bilateral upper extremities   - WBAT to lower extremities  - weight bearing with left forearm and elbow is okay. No weight bearing with right upper extremity     Order Specific Question Answer Comments   Is discharge order? Yes      Physical Therapy Adult Consult   Order Comments: Evaluate and treat as clinically indicated.    Reason:  open reduction and internal fixation of a right olecranon fracture and open reduction internal fixation of a left distal radius fracture     Occupational Therapy Adult Consult   Order Comments: Evaluate and treat as clinically indicated.    Reason:  open reduction and internal fixation of a right olecranon fracture and open reduction internal fixation of a left distal radius fracture     Fall precautions     Diet   Order Comments: Follow this diet upon discharge: Orders Placed This Encounter      Regular Diet Adult     Order Specific Question Answer Comments   Is discharge order? Yes        Pending Tests at Discharge:   none    Discharge Disposition:     Discharged to short-term care facility     Carolyn Harrington MS, PA-C  Hospitalist Service  Pager [phone]    >30 minutes was spent in discharge planning, care coordination, physical examination and medication reconciliation on the date of discharge, 2/15/2023

## 2023-02-15 NOTE — PROGRESS NOTES
[place] [facility]  INITIAL VISIT NOTE  February 16, 2023    PRIMARY CARE PROVIDER AND CLINIC:  [facility]. [address] [facility] [place] [address]    CHIEF COMPLAINT:  Hospital follow-up/Initial visit    HPI:    Ginger Marshall is a 82 year old  (1940) female who [facility] for an initial visit.     Medical history is notable for dyslipidemia, GERD, duodenal ulcer, rheumatoid arthritis, chronic anemia, osteoarthritis, anxiety, depression, migraine, osteoporosis, chronic low back pain, obesity, and SHERRY.    Summary of hospital course:  Patient was hospitalized at [facility] from February 11 through February 15, 2023 for right olecranon and left distal radius fracture.  She originally presented with left wrist pain, right elbow pain, and facial pain after a fall which occurred while she was walking her dog.  CT head, CT cervical spine, and CT facial bones imaging studies were negative for acute traumatic injuries.  X-ray of the left wrist demonstrated complex comminuted and impacted intra-articular distal radius fracture with displacement and dorsal angulation and x-ray of the right elbow was remarkable for complex comminuted fracture of the olecranon and proximal ulna with intra-articular involvement.  EKG showed normal sinus rhythm with sinus arrhythmia.  Initial blood work was significant for hemoglobin of 10.5.  She was evaluated by orthopedic service and underwent ORIF of right elbow and left wrist fracture on February 12, 2023.  EBL was reportedly 20 mL.  Hemoglobin postop dropped to 9.1.  Nichols catheter was discontinued postoperatively.  TCU was recommended per therapies.    Patient is admitted to this facility for medical management, nursing care, and rehab.     Of note, history was obtained from patient, facility RN, and extensive review of the chart.    Today's visit:  Patient was seen in her room, while sitting in a wheelchair.   She appears comfortable and cheerful.  She rates her surgical pain at 5 out of 10.  She continues to use CPAP at night.  She has not had a bowel movement for 5 days.  She denies fever, chills, chest pain, palpitation, dyspnea, nausea, vomiting, abdominal pain, or urinary symptoms.      CODE STATUS:   DNR / DNI    PAST MEDICAL HISTORY:   Complex right olecranon and proximal ulna fracture, s/p ORIF on February 12, 2023  Complex left intra-articular distal radius fracture, s/p ORIF on February 12, 2023  Right 6th or 7th rib fracture in January 2023  Dyslipidemia  GERD  Duodenal ulcer  Rheumatoid arthritis  Allergic rhinitis  Left thyroid nodule, measuring 20 mm, noted incidentally on CT cervical spine on February 11, 2023  Left lumpectomy for benign lesion  Colon polyps  Pulmonary nodule  Chronic anemia, baseline hemoglobin 11-12  Iron deficiency  B12 deficiency  Depression  Anxiety  Migraine  Osteoporosis  Osteoarthritis  Spinal stenosis of lumbar region  Chronic low back pain  Obesity, BMI 33.5  SHERRY, on CPAP    Note: Normal PFT in July 2021      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     Menopause 1989    late 40's     Migraine 04/27/2014    resolved     Multinodular goiter      Other chronic pain     joints     Pelvic fracture (H) 05/13/2014     Rheumatic mitral stenosis      Rheumatoid arteritis (H)      S/P lumbar fusion 04/03/2017     Sleep apnea      Vitamin B12 deficiency        PAST SURGICAL  HISTORY:   Past Surgical History:   Procedure Laterality Date     ABDOMEN SURGERY      c sections     ARTHROSCOPY KNEE Left      ARTHROSCOPY KNEE RT/LT  2006    left     BACK SURGERY  2013    disc     BIOPSY BREAST       BREAST SURGERY      lumpectomy      BREAST SURGERY      lumpectomy     CATARACT EXTRACTION Bilateral      CATARACT IOL, RT/LT Bilateral 2010 aproximately      SECTION  1966      SECTION  1972     COLONOSCOPY  2009,     COLONOSCOPY       FINGER SURGERY Right 2019    Procedure: DISTAL ULNA RESECTION, RIGHT MIDDLE SILASTIC METACARPALPHALANGEAL EXCHANGE AND RIGHT ELBOW NODULE EXCISION.;  Surgeon: Hilario Redmond MD;  Location: [facility] OR;  Service: Orthopedics     FOOT SURGERY Left      GYN SURGERY  66,74     HAND SURGERY Right      HAND SURGERY Right      HC ESOPH/GAS REFLUX TEST W NASAL IMPED >1 HR  2012    Procedure:ESOPHAGEAL IMPEDENCE FUNCTION TEST WITH 24 HOUR PH GREATER THAN 1 HOUR; Surgeon:KAYKAY JULIO; Location:[facility] GI     IR LUMBAR EPIDURAL STEROID INJECTION       IR TRANSLAMINAR EPIDURAL LUMBAR INJ INCL IMAGING  2012    LESI L5-S1 at [facility]     LUMBAR FUSION       OPEN REDUCTION INTERNAL FIXATION ELBOW Right 2023    Procedure: OPEN REDUCTION INTERNAL FIXATION, RIGHT OLECRANON FRACTURE;  Surgeon: Pili Brock MD;  Location:  OR     OPEN REDUCTION INTERNAL FIXATION WRIST Left 2023    Procedure: OPEN REDUCTION INTERNAL FIXATION, LEFT DISTAL RADIUS FRACTURE;  Surgeon: Pili Brock MD;  Location:  OR     OPTICAL TRACKING SYSTEM FUSION POSTERIOR SPINE LUMBAR N/A 2017    Procedure: OPTICAL TRACKING SYSTEM FUSION SPINE POSTERIOR LUMBAR ONE LEVEL;  Surgeon: Anthony Michele MD;  Location:  OR     ORTHOPEDIC SURGERY      left foot surgery     ORTHOPEDIC SURGERY      R MCP surgery     ORTHOPEDIC SURGERY      right knee total replacement     TOTAL HIP ARTHROPLASTY Right       TOTAL KNEE ARTHROPLASTY Left      TOTAL KNEE ARTHROPLASTY Right      ZZC ANESTH,TOTAL HIP ARTHROPLASTY  2010    Rt hip     [facility] HAND/FINGER SURGERY UNLISTED  2005    right hand     [facility] TOTAL KNEE ARTHROPLASTY  2006    left       FAMILY HISTORY:   Family History   Problem Relation Age of Onset     Arthritis Mother      Alzheimer Disease Mother      Hyperlipidemia Mother      Osteoporosis Mother      Heart Disease Father         MI ( from this)     Alcohol/Drug Father      Arthritis Sister      Hypertension Sister      Other Cancer Sister         Luekemia     Cancer Son      Diabetes Son         type 1     Neurologic Disorder Sister         Schizophrenic     Hypertension Sister      Hyperlipidemia Sister      Mental Illness Sister      Diabetes Son      Other Cancer Son      Substance Abuse Son      Glaucoma Daughter      Diabetes Other         type 2     Hyperlipidemia Other          SOCIAL HISTORY:  Social History     Tobacco Use     Smoking status: Former     Packs/day: 1.00     Years: 41.00     Pack years: 41.00     Types: Cigarettes     Quit date: 1999     Years since quittin.1     Smokeless tobacco: Never     Tobacco comments:     former smoker   Substance Use Topics     Alcohol use: Yes     Alcohol/week: 0.0 standard drinks     Comment: Occasionally,  usually wine       MEDICATIONS:  Current Outpatient Medications   Medication Sig Dispense Refill     acetaminophen (TYLENOL) 325 MG tablet Take 3 tablets (975 mg) by mouth every 8 hours       albuterol (PROAIR HFA/PROVENTIL HFA/VENTOLIN HFA) 108 (90 Base) MCG/ACT inhaler Inhale 2 puffs into the lungs every 6 hours as needed for shortness of breath, wheezing or cough       atorvastatin (LIPITOR) 40 MG tablet Take 1 tablet (40 mg) by mouth daily 90 tablet 3     busPIRone (BUSPAR) 10 MG tablet Take 1 tablet (10 mg) by mouth 2 times daily 180 tablet 1     carboxymethylcellulose PF (REFRESH PLUS) 0.5 % ophthalmic solution Place 1 drop into both eyes  2 times daily as needed for dry eyes       desvenlafaxine (PRISTIQ) 100 MG 24 hr tablet Take 1 tablet (100 mg) by mouth daily 90 tablet 1     gabapentin (NEURONTIN) 300 MG capsule Take 2 capsules (600 mg) by mouth 2 times daily       guaiFENesin (MUCINEX) 600 MG 12 hr tablet Take 1 tablet (600 mg) by mouth 2 times daily 20 tablet 0     hydrocortisone, Perianal, (HYDROCORTISONE) 2.5 % cream Place rectally 2 times daily as needed for hemorrhoids 30 g 1     HYDROmorphone (DILAUDID) 2 MG tablet Take 1 tablet (2 mg) by mouth every 3 hours as needed for moderate pain (4-6) 10 tablet 0     ipratropium (ATROVENT) 0.06 % nasal spray 2 sprays in each nostril, 2-3 times per day as needed for rhinitis 15 mL 1     [START ON 2/17/2023] leucovorin (WELLCOVORIN) 5 MG tablet Take 1 tablet (5 mg) by mouth every 7 days . Take 24 hours after taking Methotrexate each week. 13 tablet 2     Lidocaine (LIDOCARE) 4 % Patch Place 1 patch onto the skin every 24 hours To prevent lidocaine toxicity, patient should be patch free for 12 hrs daily.       loratadine (CLARITIN) 10 MG tablet Take 1 tablet (10 mg) by mouth 2 times daily       [START ON 2/16/2023] Methotrexate, PF, (RASUVO) 25 MG/0.5ML autoinjector Inject 0.5 mLs (25 mg) Subcutaneous every 7 days . Hold for signs of infection, and seek medical attention. Take every Thursday.       naproxen sodium (ANAPROX) 220 MG tablet Take 2 tablets (440 mg) by mouth daily as needed for moderate pain (4-6) (less than monthly use currently)       pantoprazole (PROTONIX) 40 MG EC tablet TAKE ONE TABLET BY MOUTH ONCE DAILY 30-60 MINUTES BEFORE A MEAL 90 tablet 1     senna-docusate (SENOKOT-S/PERICOLACE) 8.6-50 MG tablet Take 1 tablet by mouth 2 times daily       sucralfate (CARAFATE) 1 GM tablet Take 1 tablet (1 g) by mouth 4 times daily as needed for nausea (reflux) TAKE ONE TABLET BY MOUTH FOUR TIMES A DAY AS NEEDED HEARTBURN       Upadacitinib ER (RINVOQ) 15 MG TB24 Take 15 mg by mouth daily 30  "tablet 6     vitamin C (ASCORBIC ACID) 1000 MG TABS Take 1 tablet (1,000 mg) by mouth daily       zinc gluconate 50 MG tablet Take 1 tablet (50 mg) by mouth daily         MED REC REQUIRED  Post Medication Reconciliation Status: discharge medications reconciled, continue medications without change      ALLERGIES:  Allergies   Allergen Reactions     Abatacept Other (See Comments)     Severe headaches  Migraine     Celebrex [Celecoxib]      Ineffective     Celecoxib Unknown and Nausea     Levaquin [Levofloxacin] Fatigue     Severe body pain     Orencia [Abatacept]      Headache     Septra [Bactrim]      Sulfa Drugs Nausea and Vomiting     \"deathly ill\"  Allergic to everything with Sulfa in it.     Sulfamethoxazole-Trimethoprim Nausea and Nausea and Vomiting     Tramadol Other (See Comments)     Headache  Migraine headache     Valdecoxib Unknown and Nausea     Made me \"very very ill\", might of been \"cramping\"     Adhesive Tape Rash     Plastic tape  Plastic tapes     Antihistamines, Chlorpheniramine-Type  [Alkylamines] Anxiety and Other (See Comments)       ROS:  10 point ROS were negative other than the symptoms noted above in the HPI.    PHYSICAL EXAM:  Vital signs were reviewed in the chart.  Vital Signs: BP (!) 162/81   Pulse 73   Temp 97.6  F (36.4  C)   Resp 18   Ht 1.664 m (5' 5.5\")   Wt 93 kg (205 lb)   SpO2 92%   BMI 33.59 kg/m    General: Cheerful, comfortable, and in no acute distress  HEENT: Conjunctival pallor, no scleral icterus or injection, moist oral mucosa  Cardiovascular: Normal S1, S2, RRR  Respiratory: Lungs clear to auscultation bilaterally  GI: Abdomen soft, non-tender, non-distended, +BS  Extremities: Right forearm/elbow and left wrist is splinted; no lower extremity edema  Neuro: CX II-XII grossly intact; ROM in all four extremities grossly intact  Psych: Alert and oriented x3; normal affect  Skin: No acute rash    LABORATORY/IMAGING DATA:  All relevant labs and imaging data in EPIC " and/or Care Everywhere were personally reviewed today.      Most Recent 3 CBC's:Recent Labs   Lab Test 02/15/23  0642 02/12/23  1633 02/11/23  0819 01/16/23  1122   WBC  --  10.0 5.7 6.3   HGB 9.1* 9.4* 10.5* 11.0*   MCV  --  98 95 97   PLT  --  315 406 379     Most Recent 3 BMP's:Recent Labs   Lab Test 02/12/23  1633 02/11/23  0819 01/16/23  1122 10/20/22  1256 10/12/22  1212    138  --   --  140   POTASSIUM 3.8 3.9  --   --  4.1   CHLORIDE 102 103  --   --  103   CO2 25 25  --   --  25   BUN 10.3 12.1  --   --  8.3   CR 0.52 0.51 0.68   < > 0.64   ANIONGAP 9 10  --   --  12   BRANDEE 8.6* 9.3 9.2  --  9.4   * 112*  --   --  99    < > = values in this interval not displayed.         ASSESSMENT/PLAN:  Mechanical fall, subsequent encounter,  Complex right olecranon and proximal ulna fracture, s/p ORIF on February 12, 2023,  Complex left intra-articular distal radius fracture, s/p ORIF on February 12, 2023,  Recent history of right 6th or 7th rib fracture in January 2023,  Osteoporosis with current pathologic fractures,  Physical deconditioning.  Patient is on Reclast 5 mg IV every 12 months for osteoporosis.  Plan:  Fall precautions  Pain management with acetaminophen 975 mg 3 times daily, hydromorphone 2 mg every 3 hours PRN, lidocaine patch, gabapentin 600 mg twice daily, and PRN naproxen 440 mg daily  NWB to RUE  OK for weightbearing with left forearm and elbow   Continue PT/OT evaluation and therapy  Follow-up with Ortho next Wednesday, February 22, as directed    Acute blood loss anemia:  Chronic anemia.  Baseline hemoglobin 11-12.  Patient present with hemoglobin of 10.5 on admission to the hospital.  Drop in hemoglobin to 9.1 postoperatively despite minimal intraoperative blood loss.  Plan:  Monitor hemoglobin periodically  Recheck CBC on February 22    Dyslipidemia.  Plan:  Continue atorvastatin 40 mg daily    GERD,  History of duodenal ulcer.  Plan:  Continue pantoprazole 40 mg daily  Continue  sucralfate 1 g 4 times daily as needed    Rheumatoid arthritis with positive rheumatoid factor.  Plan:  Continue methotrexate 25 mg subcu every Thursday  Continue leucovorin 5 mg every Thursday  Continue upadacitinib (Rinvoq) 15 mg daily  Follow-up with primary rheumatologist as directed    Allergic rhinitis.  Plan:  Continue Claritin 10 mg twice daily  Continue Atrovent 0.06% 2 sprays in each nostril    Depression,  Anxiety.  Symptoms are controlled.  Plan:  Continue buspirone 10 mg twice daily  Continue desvenlafaxine 100 mg daily  Monitor symptoms  Refer to ACP PRN    Spinal stenosis of lumbar region,  Chronic low back pain.  Plan:  Continue pain management as above    Slow transit constipation.  Plan:  Continue the bowel regimen    Obesity, BMI 33.5,  SHERRY, on CPAP.  Plan:  Continue nocturnal CPAP per home settings  Staff to assist with daily care and mobility      Note: Patient takes albuterol inhaler 2 puffs every 6 hours for shortness of breath, cough or wheezing.  COPD is listed on her medical history but she had normal PFT in July 2021.      INCIDENTAL FINDINGS:  Left thyroid nodule.  Measuring 20 mm, noted incidentally on CT cervical spine on February 11, 2023.  Plan:  Follow-up as outpatient        Orders written by provider at facility:  CBC on February 22, DX: Anemia  BMP on December 22, DX: Elbow fracture        Disclaimer: This note may contain text created using speech-recognition software and may contain unintended word substitutions.      Electronically signed by:  Ralph Dai MD

## 2023-02-15 NOTE — PLAN OF CARE
Physical Therapy Discharge Summary    Reason for therapy discharge:    Discharged to transitional care facility.    Progress towards therapy goal(s). See goals on Care Plan in [facility] electronic health record for goal details.  Goals not met.  Barriers to achieving goals:   discharge from facility.    Therapy recommendation(s):    Continued therapy is recommended.  Rationale/Recommendations:  PT as indicated in TCU setting.      Note: Pt not seen by documenting PT on this date. Information obtained from chart review.

## 2023-02-15 NOTE — PLAN OF CARE
Occupational Therapy Discharge Summary    Reason for therapy discharge:    Plan to discharge to TCU this afternoon.    Progress towards therapy goal(s). See goals on Care Plan in [facility] electronic health record for goal details.  Goals not met.  Barriers to achieving goals:   discharge from facility.    Therapy recommendation(s):    Continued therapy is recommended.  Rationale/Recommendations:  to increase indep and safety with ADLs.

## 2023-02-16 ENCOUNTER — LAB REQUISITION (OUTPATIENT)
Dept: LAB | Facility: CLINIC | Age: 83
End: 2023-02-16
Payer: COMMERCIAL

## 2023-02-16 ENCOUNTER — TRANSITIONAL CARE UNIT VISIT (OUTPATIENT)
Dept: GERIATRICS | Facility: CLINIC | Age: 83
End: 2023-02-16
Payer: COMMERCIAL

## 2023-02-16 ENCOUNTER — PATIENT OUTREACH (OUTPATIENT)
Dept: CARE COORDINATION | Facility: CLINIC | Age: 83
End: 2023-02-16

## 2023-02-16 VITALS
OXYGEN SATURATION: 92 % | BODY MASS INDEX: 32.95 KG/M2 | DIASTOLIC BLOOD PRESSURE: 81 MMHG | HEART RATE: 73 BPM | TEMPERATURE: 97.6 F | HEIGHT: 66 IN | WEIGHT: 205 LBS | SYSTOLIC BLOOD PRESSURE: 162 MMHG | RESPIRATION RATE: 18 BRPM

## 2023-02-16 DIAGNOSIS — R53.81 PHYSICAL DECONDITIONING: ICD-10-CM

## 2023-02-16 DIAGNOSIS — E66.09 CLASS 1 OBESITY DUE TO EXCESS CALORIES WITH BODY MASS INDEX (BMI) OF 33.0 TO 33.9 IN ADULT, UNSPECIFIED WHETHER SERIOUS COMORBIDITY PRESENT: ICD-10-CM

## 2023-02-16 DIAGNOSIS — M05.9 RHEUMATOID ARTHRITIS WITH POSITIVE RHEUMATOID FACTOR, INVOLVING UNSPECIFIED SITE (H): ICD-10-CM

## 2023-02-16 DIAGNOSIS — J30.9 ALLERGIC RHINITIS, UNSPECIFIED SEASONALITY, UNSPECIFIED TRIGGER: ICD-10-CM

## 2023-02-16 DIAGNOSIS — G89.29 CHRONIC LOW BACK PAIN, UNSPECIFIED BACK PAIN LATERALITY, UNSPECIFIED WHETHER SCIATICA PRESENT: ICD-10-CM

## 2023-02-16 DIAGNOSIS — K59.01 SLOW TRANSIT CONSTIPATION: ICD-10-CM

## 2023-02-16 DIAGNOSIS — M80.00XD OSTEOPOROSIS WITH CURRENT PATHOLOGICAL FRACTURE WITH ROUTINE HEALING, UNSPECIFIED OSTEOPOROSIS TYPE, SUBSEQUENT ENCOUNTER: ICD-10-CM

## 2023-02-16 DIAGNOSIS — D62 ACUTE BLOOD LOSS ANEMIA: ICD-10-CM

## 2023-02-16 DIAGNOSIS — D64.9 CHRONIC ANEMIA: ICD-10-CM

## 2023-02-16 DIAGNOSIS — E78.5 DYSLIPIDEMIA: ICD-10-CM

## 2023-02-16 DIAGNOSIS — E04.1 THYROID NODULE: ICD-10-CM

## 2023-02-16 DIAGNOSIS — G47.33 OSA (OBSTRUCTIVE SLEEP APNEA): ICD-10-CM

## 2023-02-16 DIAGNOSIS — K21.9 GASTROESOPHAGEAL REFLUX DISEASE, UNSPECIFIED WHETHER ESOPHAGITIS PRESENT: ICD-10-CM

## 2023-02-16 DIAGNOSIS — F32.A DEPRESSION, UNSPECIFIED DEPRESSION TYPE: ICD-10-CM

## 2023-02-16 DIAGNOSIS — F41.9 ANXIETY: ICD-10-CM

## 2023-02-16 DIAGNOSIS — S52.021A CLOSED FRACTURE OF OLECRANON PROCESS OF RIGHT ULNA, INITIAL ENCOUNTER: ICD-10-CM

## 2023-02-16 DIAGNOSIS — Z87.19 HISTORY OF DUODENAL ULCER: ICD-10-CM

## 2023-02-16 DIAGNOSIS — S52.021D CLOSED FRACTURE OF OLECRANON PROCESS OF RIGHT ULNA WITH ROUTINE HEALING, SUBSEQUENT ENCOUNTER: ICD-10-CM

## 2023-02-16 DIAGNOSIS — M48.061 SPINAL STENOSIS OF LUMBAR REGION, UNSPECIFIED WHETHER NEUROGENIC CLAUDICATION PRESENT: ICD-10-CM
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
## 2023-02-16 DIAGNOSIS — U07.1 COVID-19: ICD-10-CM

## 2023-02-16 DIAGNOSIS — E66.811 CLASS 1 OBESITY DUE TO EXCESS CALORIES WITH BODY MASS INDEX (BMI) OF 33.0 TO 33.9 IN ADULT, UNSPECIFIED WHETHER SERIOUS COMORBIDITY PRESENT: ICD-10-CM

## 2023-02-16 DIAGNOSIS — S52.502D CLOSED FRACTURE OF DISTAL END OF LEFT RADIUS WITH ROUTINE HEALING, UNSPECIFIED FRACTURE MORPHOLOGY, SUBSEQUENT ENCOUNTER: ICD-10-CM

## 2023-02-16 DIAGNOSIS — M54.50 CHRONIC LOW BACK PAIN, UNSPECIFIED BACK PAIN LATERALITY, UNSPECIFIED WHETHER SCIATICA PRESENT: ICD-10-CM

## 2023-02-16 LAB — SARS-COV-2 RNA RESP QL NAA+PROBE: NEGATIVE

## 2023-02-16 PROCEDURE — U0005 INFEC AGEN DETEC AMPLI PROBE: HCPCS | Performed by: NURSE PRACTITIONER

## 2023-02-16 PROCEDURE — 36415 COLL VENOUS BLD VENIPUNCTURE: CPT | Performed by: NURSE PRACTITIONER

## 2023-02-16 PROCEDURE — 86481 TB AG RESPONSE T-CELL SUSP: CPT | Performed by: NURSE PRACTITIONER

## 2023-02-16 PROCEDURE — 99305 1ST NF CARE MODERATE MDM 35: CPT | Performed by: INTERNAL MEDICINE

## 2023-02-16 RX ORDER — HYDROMORPHONE HYDROCHLORIDE 2 MG/1
2 TABLET ORAL
COMMUNITY
End: 2023-02-17

## 2023-02-16 RX ORDER — HYDROMORPHONE HYDROCHLORIDE 2 MG/1
2 TABLET ORAL
Qty: 20 TABLET | Refills: 0 | Status: SHIPPED | DISCHARGE
Start: 2023-02-16 | End: 2023-02-16

## 2023-02-16 NOTE — PROGRESS NOTES
Clinic Care Coordination Contact  Care Coordination Transition Communication    Referral Source: IP Handoff    Clinical Data: Patient was hospitalized at [facility] from 02/11/2023 to 02/15/2023 with diagnosis of fall, fracture.     Transition to Facility:              Facility Name: [facility]              Contact name and phone number/fax:   Ambulatory Care Coordination to TCU Hand In Communication:     Name: Ginger Marshall is a patient of Duy Leyva MD at [facility] and I am the care coordinator.   CC Contact Information: Email: [email or web address]   Phone: [phone]   Follow up recommendations:   - Scheduled:   Appointments in Next Year    Feb 16, 2023 10:30 AM  Transitional Care Unit Visit with Ralph Dai MD, MD  [facility] ([facility] ) [phone] Feb 21, 2023 11:30 AM  (Arrive by 11:10 AM)  Annual Wellness Visit with Duy Leyva MD  [facility] ([facility] ) [phone]   Feb 23, 2023  9:00 AM  (Arrive by 8:45 AM)  Adult Psychotherapy with HECTOR Springer  [facility] ([facility] ) [phone]   Feb 27, 2023  9:00 AM  RETURN ENDOCRINE with Annmarie Smith MD  [facility] ([facility] ) [phone]   Mar 15, 2023 10:00 AM  (Arrive by 9:45 AM)  Adult Psychotherapy with HECTOR Springer  [facility] [place] [facility] ([facility] ) [phone]   Mar 21, 2023 11:00 AM  Infusion Visit with  FAST TRACK PLUS 2  [facility] [place] ([facility] ) [phone]   Apr 24, 2023 10:00 AM  MT[facility] (  [facility] ) [phone]   May 09, 2023  2:00 PM  (Arrive by 1:45 PM)  Return Visit with Nico Byers MD  [facility] ([facility] ) [phone]         - To be scheduled: Primary Care Provider follow up within 14 days of TCU discharge.     I would like to collaborate with the TCU Care team during this patient's stay; please invite me to any care conferences.     Please feel free to contact me with questions or further collaboration in discharge planning.       Plan: RN Care Coordinator will await notification from facility staff informing RN Care Coordinator of patient's discharge plans/needs. RN Care Coordinator will review chart and outreach to facility staff every 4 weeks and as needed.     Lucille Awad, RN Care Coordinator  [facility] [place]  Email: [email or web address]  Phone: [phone]

## 2023-02-16 NOTE — PATIENT INSTRUCTIONS
Orders for Ginger Marshall (1940), MR# [id number]:    1) CBC on February 22, DX: Anemia  2) BMP on December 22, DX: Elbow fracture      Ralph Dai MD  [facility]

## 2023-02-16 NOTE — PLAN OF CARE
Pt a/o x4. VSS. C/o right side rib pain, tylenol, dilaudid given, and ice pack applied. Assist of 1 with gait belt and walker. CMS intact right and left arm/fingers. Home medication sent with pt. Pt home CPAP sent with pt. All other belongings sent with pt. TCU will have walker with arm rest for here there. Pt discharging to TCU. Daughter bringing pt to TCU.

## 2023-02-17 DIAGNOSIS — G89.29 CHRONIC BILATERAL LOW BACK PAIN WITHOUT SCIATICA: Primary | Chronic | ICD-10-CM

## 2023-02-17 DIAGNOSIS — M54.50 CHRONIC BILATERAL LOW BACK PAIN WITHOUT SCIATICA: Primary | Chronic | ICD-10-CM

## 2023-02-17 LAB — SARS-COV-2 RNA RESP QL NAA+PROBE: NEGATIVE

## 2023-02-17 RX ORDER — HYDROMORPHONE HYDROCHLORIDE 2 MG/1
2 TABLET ORAL
Qty: 30 TABLET | Refills: 0 | Status: SHIPPED | OUTPATIENT
Start: 2023-02-17 | End: 2023-02-28

## 2023-02-19 LAB
GAMMA INTERFERON BACKGROUND BLD IA-ACNC: 0.05 IU/ML
M TB IFN-G BLD-IMP: ABNORMAL
M TB IFN-G CD4+ BCKGRND COR BLD-ACNC: 0.38 IU/ML
MITOGEN IGNF BCKGRD COR BLD-ACNC: -0.01 IU/ML
MITOGEN IGNF BCKGRD COR BLD-ACNC: -0.01 IU/ML
QUANTIFERON MITOGEN: 0.43 IU/ML
QUANTIFERON NIL TUBE: 0.05 IU/ML
QUANTIFERON TB1 TUBE: 0.04 IU/ML
QUANTIFERON TB2 TUBE: 0.04

## 2023-02-20 ENCOUNTER — LAB REQUISITION (OUTPATIENT)
Dept: LAB | Facility: CLINIC | Age: 83
End: 2023-02-20
Payer: COMMERCIAL

## 2023-02-20 ENCOUNTER — TRANSITIONAL CARE UNIT VISIT (OUTPATIENT)
Dept: GERIATRICS | Facility: CLINIC | Age: 83
End: 2023-02-20
Payer: COMMERCIAL

## 2023-02-20 VITALS
DIASTOLIC BLOOD PRESSURE: 78 MMHG | BODY MASS INDEX: 33.69 KG/M2 | WEIGHT: 202.2 LBS | HEIGHT: 65 IN | OXYGEN SATURATION: 96 % | TEMPERATURE: 97.6 F | SYSTOLIC BLOOD PRESSURE: 149 MMHG | HEART RATE: 77 BPM | RESPIRATION RATE: 18 BRPM

## 2023-02-20 DIAGNOSIS — S52.502D CLOSED FRACTURE OF DISTAL END OF LEFT RADIUS WITH ROUTINE HEALING, UNSPECIFIED FRACTURE MORPHOLOGY, SUBSEQUENT ENCOUNTER: ICD-10-CM

## 2023-02-20 DIAGNOSIS — E66.811 CLASS 1 OBESITY DUE TO EXCESS CALORIES WITH BODY MASS INDEX (BMI) OF 33.0 TO 33.9 IN ADULT, UNSPECIFIED WHETHER SERIOUS COMORBIDITY PRESENT: ICD-10-CM

## 2023-02-20 DIAGNOSIS — R53.81 PHYSICAL DECONDITIONING: ICD-10-CM

## 2023-02-20 DIAGNOSIS — M54.50 CHRONIC LOW BACK PAIN, UNSPECIFIED BACK PAIN LATERALITY, UNSPECIFIED WHETHER SCIATICA PRESENT: ICD-10-CM
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
## 2023-02-20 DIAGNOSIS — E66.09 CLASS 1 OBESITY DUE TO EXCESS CALORIES WITH BODY MASS INDEX (BMI) OF 33.0 TO 33.9 IN ADULT, UNSPECIFIED WHETHER SERIOUS COMORBIDITY PRESENT: ICD-10-CM

## 2023-02-20 DIAGNOSIS — K59.01 SLOW TRANSIT CONSTIPATION: ICD-10-CM

## 2023-02-20 DIAGNOSIS — G47.33 OSA (OBSTRUCTIVE SLEEP APNEA): ICD-10-CM

## 2023-02-20 DIAGNOSIS — M05.9 RHEUMATOID ARTHRITIS WITH POSITIVE RHEUMATOID FACTOR, INVOLVING UNSPECIFIED SITE (H): ICD-10-CM

## 2023-02-20 DIAGNOSIS — S22.31XD CLOSED FRACTURE OF ONE RIB OF RIGHT SIDE WITH ROUTINE HEALING, SUBSEQUENT ENCOUNTER: ICD-10-CM

## 2023-02-20 DIAGNOSIS — M48.061 SPINAL STENOSIS OF LUMBAR REGION, UNSPECIFIED WHETHER NEUROGENIC CLAUDICATION PRESENT: ICD-10-CM

## 2023-02-20 DIAGNOSIS — E04.1 THYROID NODULE: ICD-10-CM

## 2023-02-20 DIAGNOSIS — E78.5 DYSLIPIDEMIA: ICD-10-CM

## 2023-02-20 DIAGNOSIS — K21.9 GASTROESOPHAGEAL REFLUX DISEASE, UNSPECIFIED WHETHER ESOPHAGITIS PRESENT: ICD-10-CM

## 2023-02-20 DIAGNOSIS — S52.021D CLOSED FRACTURE OF OLECRANON PROCESS OF RIGHT ULNA WITH ROUTINE HEALING, SUBSEQUENT ENCOUNTER: Primary | ICD-10-CM

## 2023-02-20 DIAGNOSIS — G89.29 CHRONIC LOW BACK PAIN, UNSPECIFIED BACK PAIN LATERALITY, UNSPECIFIED WHETHER SCIATICA PRESENT: ICD-10-CM

## 2023-02-20 DIAGNOSIS — F32.A DEPRESSION, UNSPECIFIED DEPRESSION TYPE: ICD-10-CM

## 2023-02-20 DIAGNOSIS — U07.1 COVID-19: ICD-10-CM

## 2023-02-20 DIAGNOSIS — M80.00XD OSTEOPOROSIS WITH CURRENT PATHOLOGICAL FRACTURE WITH ROUTINE HEALING, UNSPECIFIED OSTEOPOROSIS TYPE, SUBSEQUENT ENCOUNTER: ICD-10-CM

## 2023-02-20 DIAGNOSIS — F41.9 ANXIETY: ICD-10-CM

## 2023-02-20 DIAGNOSIS — J30.9 ALLERGIC RHINITIS, UNSPECIFIED SEASONALITY, UNSPECIFIED TRIGGER: ICD-10-CM

## 2023-02-20 DIAGNOSIS — D62 ACUTE BLOOD LOSS ANEMIA: ICD-10-CM

## 2023-02-20 DIAGNOSIS — D64.9 CHRONIC ANEMIA: ICD-10-CM

## 2023-02-20 PROCEDURE — 99309 SBSQ NF CARE MODERATE MDM 30: CPT | Performed by: NURSE PRACTITIONER

## 2023-02-20 PROCEDURE — U0003 INFECTIOUS AGENT DETECTION BY NUCLEIC ACID (DNA OR RNA); SEVERE ACUTE RESPIRATORY SYNDROME CORONAVIRUS 2 (SARS-COV-2) (CORONAVIRUS DISEASE [COVID-19]), AMPLIFIED PROBE TECHNIQUE, MAKING USE OF HIGH THROUGHPUT TECHNOLOGIES AS DESCRIBED BY CMS-2020-01-R: HCPCS | Performed by: NURSE PRACTITIONER

## 2023-02-20 RX ORDER — POLYETHYLENE GLYCOL 3350 17 G/17G
17 POWDER, FOR SOLUTION ORAL DAILY
Qty: 510 G
Start: 2023-02-20 | End: 2023-03-14

## 2023-02-20 ASSESSMENT — ENCOUNTER SYMPTOMS
EYE PAIN: 0
BREAST MASS: 0
FREQUENCY: 0
DYSURIA: 0
SHORTNESS OF BREATH: 0
NAUSEA: 0
SORE THROAT: 0
NERVOUS/ANXIOUS: 1
ARTHRALGIAS: 0
JOINT SWELLING: 0
MYALGIAS: 0
ABDOMINAL PAIN: 0
HEMATOCHEZIA: 0
COUGH: 0
HEADACHES: 0
CHILLS: 0
DIARRHEA: 0
DIZZINESS: 0
HEARTBURN: 0
FEVER: 0
PARESTHESIAS: 0
WEAKNESS: 0
PALPITATIONS: 0
HEMATURIA: 0
CONSTIPATION: 0

## 2023-02-20 ASSESSMENT — ACTIVITIES OF DAILY LIVING (ADL)
CURRENT_FUNCTION: LAUNDRY REQUIRES ASSISTANCE
CURRENT_FUNCTION: HOUSEWORK REQUIRES ASSISTANCE

## 2023-02-20 NOTE — PATIENT INSTRUCTIONS
Orders  Ginger A Sweats  1940  1) Start miralax 17 gram daily. Diagnosis: constipation  2) IS 10 reps 4 times daily with assist.  AKIRA Breen CNP on 2/20/2023 at 2:32 PM

## 2023-02-20 NOTE — PROGRESS NOTES
[facility] GERIATRICS    Chief Complaint   Patient presents with     RECHECK     HPI:  Ginger Marshall is a 82 year old  (1940), who is being seen today for an episodic care visit at: [facility]  ([facility]) [[id number]].     Past medical history significant for dyslipidemia, duodenal ulcer, GERD, rheumatoid arthritis, chronic anemia, anxiety, depression, migraine, chronic low back pain, osteoporosis obesity, SHERRY.    Summary of recent hospitalization:  Patient was hospitalized at [facility] from February 11 to February 15 for right olecranial and and left distal radius fracture secondary to fall.  Patient presented to the emergency department for evaluation of left wrist pain, right elbow pain, and facial pain due to a fall that occurred when she was walking her dog.  CT head, CT facial bones, CT cervical spine studies were negative for acute traumatic injuries.  X-ray of the left wrist demonstrated complex comminuted and impacted intra-articular distal radius fracture with displacement and dorsal angulation and x-ray of right elbow was remarkable for complex comminuted fracture of the olecranial and proximal ulna with intra articular involvement.  Laboratory evaluation was significant for hemoglobin of 10.5.  Orthopedics was consulted and patient is status post ORIF of left wrist fracture and right elbow on February 12.  Hemoglobin postoperatively dropped to 9.1. [facility] to [facility] for physical rehabilitation and medical management.       Today's concern is: Patient was seen today for routine follow-up in the TCU.  She reports pain has been well managed, reports overall it is an ache.  She reports feeling constipated, is not sure when she last had a bowel movement, possibly yesterday.  She reports her appetite is good.  She denies abdominal pain, cramping.  She denies shortness of breath, chest pain, dizziness/lightheadedness.  She denies dysuria/trouble voiding.  She continues to work with  "therapy.    Reviewed facility EMR including medications, vital signs, recent nursing progress notes.  Discussed patient with nursing staff including plan of care as below.    Allergies, and PMH/PSH reviewed in EPIC today.  REVIEW OF SYSTEMS:  8 point ROS of systems including Constitutional, Respiratory, Cardiovascular, Gastroenterology, Genitourinary, Integumentary, Musculoskeletal, Psychiatric were all negative except for pertinent positives noted in my HPI.    Objective:   BP (!) 149/78   Pulse 77   Temp 97.6  F (36.4  C)   Resp 18   Ht 1.651 m (5' 5\")   Wt 91.7 kg (202 lb 3.2 oz)   SpO2 96%   BMI 33.65 kg/m    GENERAL APPEARANCE:  Alert, in NAD  HEENT: normocephalic, moist mucous membranes, nose without drainage or crusting  RESP:  respiratory effort normal, no respiratory distress, Lung sounds clear, patient is on RA  CV: auscultation of heart done, rate and rhythm regular.   ABDOMEN: + bowel sounds, soft, nontender, no grimacing or guarding with palpation.  M/S: no lower extremity edema; bilateral arms in splints- swelling to bilateral fingers-with bruising to fingers of left hand  SKIN:  Inspection and palpation of skin and subcutaneous tissue: skin warm, dry without rashes  NEURO: cranial nerves 2-12 grossly intact and at patient's baseline; moves extremities freely  PSYCH:  affect and mood normal      Labs done in SNF are in [place] [facility]. Please refer to them using [facility] Everywhere. and Recent labs in [facility] reviewed by me today.     Assessment/Plan:  Complex right olecranial and and proximal ulna fracture status post ORIF on 2/12/2023  Complex left intra-articular distal radius fracture status post ORIF on 2/12/2023  Osteoporosis with current pathologic fracture  Mechanical fall, subsequent encounter  -Pain managed  -Using Dilaudid at least once daily, has not used naproxen since admission  Plan: Continue pain management with Tylenol 975 mg 3 times daily, Dilaudid 2 mg every 3 hours as needed, " lidocaine patch, gabapentin 600 mg twice daily, naproxen 440 mg daily as needed.  Nonweightbearing to right upper extremity, okay for weightbearing with left forearm and elbow.  Therapy as below.  Follow-up with on 2/22.  Continue calcium citrate daily, vitamin D3 5000 units.  Receives Reclast yearly-next dose due 3/2023.    Recent history of right sixth or seventh rib fracture in January 2023  -Pain also an ache  Plan: Continue pain management with Tylenol 975 mg 3 times daily, Dilaudid 2 mg every 3 hours as needed, lidocaine patch, gabapentin 600 mg twice daily, naproxen 440 mg daily as needed. IS 10 reps 4 times daily with assist.    Acute blood loss anemia  Chronic anemia  -Baseline hemoglobin 11-12  -Hemoglobin 9.1 on 2/15  Plan: CBC ordered for 2/22.    Dyslipidemia  Chronic  Plan: Continue atorvastatin 40 mg daily.    Rheumatoid arthritis with positive factor  Plan: Continue pain management as above.  Continue methotrexate 25 mg subcutaneous every month leucovorin 5 mg every Thursday, Rinvoq 15 mg daily.  Follow-up with primary rheumatologist recommendations.    GERD  -With history of duodenal ulcer  Plan: Continue Protonix 40 mg daily, sucralfate 1 g 4 times daily as needed.    Spinal stenosis of lumbar region and chronic low back pain  Plan: Continue pain management as above    Depression  Anxiety  -Mood today controlled  Plan: Continue buspirone 10 mg twice daily, desvenlafaxine 100 mg daily.  Monitor mood and symptoms.  Refer to ACP as needed.    Allergic rhinitis  Plan: Continue Claritin 10 mg twice daily, Atrovent 2 sprays to each nostril TID PRN, guafenesin  mg BID.    Slow transit constipation  -Bowels constipated  Plan: Start miralax 17 gram daily. Senna S1 tab twice daily. Monitor bowels.     Obesity, BMI 33.65  SHERRY  Plan: Continue to encourage weight loss.  Continue CPAP per home settings.    Physical deconditioning  Comment: Acute, secondary to recent hospitalization, medical conditions as  above  Plan: Encourage participation in physical therapy/occupational therapy for strengthening and deconditioning. Discharge planning per their recommendation. Social work to assist with d/c planning.    Incidental findings:  Left thyroid nodule measuring 20 mm noted on CT cervical spine on 2/11/2023  Plan: follow-up outpatient      MED REC REQUIRED  Post Medication Reconciliation Status:  Discharge medications reconciled and changed, see notes/orders      Disclaimer: This note may contain text created using speech-recognition software and may contain unintended word substitutions.       Electronically signed by: AKIRA Breen CNP

## 2023-02-21 ENCOUNTER — LAB REQUISITION (OUTPATIENT)
Dept: LAB | Facility: CLINIC | Age: 83
End: 2023-02-21
Payer: COMMERCIAL

## 2023-02-21 ENCOUNTER — OFFICE VISIT (OUTPATIENT)
Dept: FAMILY MEDICINE | Facility: CLINIC | Age: 83
End: 2023-02-21
Payer: COMMERCIAL

## 2023-02-21 VITALS
BODY MASS INDEX: 33.32 KG/M2 | HEIGHT: 65 IN | HEART RATE: 78 BPM | WEIGHT: 200 LBS | OXYGEN SATURATION: 98 % | TEMPERATURE: 97.8 F

## 2023-02-21 DIAGNOSIS — M05.741 RHEUMATOID ARTHRITIS INVOLVING RIGHT HAND WITH POSITIVE RHEUMATOID FACTOR (H): Chronic | ICD-10-CM

## 2023-02-21 DIAGNOSIS — E78.5 HYPERLIPIDEMIA LDL GOAL <100: Chronic | ICD-10-CM

## 2023-02-21 DIAGNOSIS — D50.8 IRON DEFICIENCY ANEMIA REFRACTORY TO IRON THERAPY: ICD-10-CM

## 2023-02-21 DIAGNOSIS — S52.021A CLOSED FRACTURE OF OLECRANON PROCESS OF RIGHT ULNA, INITIAL ENCOUNTER: ICD-10-CM

## 2023-02-21 DIAGNOSIS — D64.9 ANEMIA, UNSPECIFIED: ICD-10-CM

## 2023-02-21 DIAGNOSIS — M81.0 OSTEOPOROSIS, UNSPECIFIED OSTEOPOROSIS TYPE, UNSPECIFIED PATHOLOGICAL FRACTURE PRESENCE: Chronic | ICD-10-CM

## 2023-02-21 DIAGNOSIS — G47.33 OSA (OBSTRUCTIVE SLEEP APNEA): Chronic | ICD-10-CM

## 2023-02-21 DIAGNOSIS — R79.89 HIGH SERUM VITAMIN D: ICD-10-CM

## 2023-02-21 DIAGNOSIS — K21.9 GASTROESOPHAGEAL REFLUX DISEASE, UNSPECIFIED WHETHER ESOPHAGITIS PRESENT: Chronic | ICD-10-CM

## 2023-02-21 DIAGNOSIS — E04.2 MULTINODULAR GOITER: Chronic | ICD-10-CM

## 2023-02-21 DIAGNOSIS — J30.2 SEASONAL ALLERGIC RHINITIS, UNSPECIFIED TRIGGER: ICD-10-CM

## 2023-02-21 DIAGNOSIS — F33.1 MODERATE EPISODE OF RECURRENT MAJOR DEPRESSIVE DISORDER (H): ICD-10-CM

## 2023-02-21 DIAGNOSIS — Z00.00 ENCOUNTER FOR MEDICARE ANNUAL WELLNESS EXAM: Primary | ICD-10-CM

## 2023-02-21 DIAGNOSIS — S52.202A CLOSED FRACTURE OF SHAFT OF LEFT ULNA, UNSPECIFIED FRACTURE MORPHOLOGY, INITIAL ENCOUNTER: ICD-10-CM

## 2023-02-21 DIAGNOSIS — E66.01 MORBID OBESITY (H): ICD-10-CM

## 2023-02-21 LAB — SARS-COV-2 RNA RESP QL NAA+PROBE: NEGATIVE

## 2023-02-21 PROCEDURE — 99214 OFFICE O/P EST MOD 30 MIN: CPT | Mod: 25 | Performed by: STUDENT IN AN ORGANIZED HEALTH CARE EDUCATION/TRAINING PROGRAM

## 2023-02-21 PROCEDURE — G0439 PPPS, SUBSEQ VISIT: HCPCS | Performed by: STUDENT IN AN ORGANIZED HEALTH CARE EDUCATION/TRAINING PROGRAM

## 2023-02-21 ASSESSMENT — ENCOUNTER SYMPTOMS
EYE PAIN: 0
SORE THROAT: 0
JOINT SWELLING: 0
COUGH: 0
HEARTBURN: 0
DYSURIA: 0
HEMATOCHEZIA: 0
CONSTIPATION: 0
BREAST MASS: 0
NAUSEA: 0
HEMATURIA: 0
PALPITATIONS: 0
CHILLS: 0
HEADACHES: 0
PARESTHESIAS: 0
FEVER: 0
ARTHRALGIAS: 0
FREQUENCY: 0
NERVOUS/ANXIOUS: 1
SHORTNESS OF BREATH: 0
WEAKNESS: 0
DIZZINESS: 0
DIARRHEA: 0
ABDOMINAL PAIN: 0
MYALGIAS: 0

## 2023-02-21 ASSESSMENT — ACTIVITIES OF DAILY LIVING (ADL)
CURRENT_FUNCTION: LAUNDRY REQUIRES ASSISTANCE
CURRENT_FUNCTION: HOUSEWORK REQUIRES ASSISTANCE

## 2023-02-21 ASSESSMENT — PATIENT HEALTH QUESTIONNAIRE - PHQ9: SUM OF ALL RESPONSES TO PHQ QUESTIONS 1-9: 5

## 2023-02-21 NOTE — PROGRESS NOTES
"SUBJECTIVE:   Ginger is a 82 year old who presents for Preventive Visit.    Patient has been advised of split billing requirements and indicates understanding: Yes  Are you in the first 12 months of your Medicare coverage?  No    Healthy Habits:     In general, how would you rate your overall health?  Fair    Frequency of exercise:  None    Do you usually eat at least 4 servings of fruit and vegetables a day, include whole grains    & fiber and avoid regularly eating high fat or \"junk\" foods?  No    Taking medications regularly:  Yes    Medication side effects:  None    Ability to successfully perform activities of daily living:  Housework requires assistance and laundry requires assistance    Home Safety:  No safety concerns identified    Hearing Impairment:  No hearing concerns    In the past 6 months, have you been bothered by leaking of urine?  No    In general, how would you rate your overall mental or emotional health?  Fair      PHQ-2 Total Score: 2    Additional concerns today:  Yes    Rib fractures  Other fractures, s/p ORIF on 2/12/2023  Hospitalized[facility] at [facility]  Taking gabapentin 600mg BID - ongoing since rib fractures earlier in 1/2023.   Other pain medication as prescribed by TCU right now.  Not taking the senokot for constipation, not needed.    Osteopenia/osteoporosis  Is currenlty on zoledronic acid.  Was on a drug holiday in the past  Had zoledronic acid in 2013, 14, and 2016.  Then break until restarting in 2021.  Plans to get next dose in one month - within [place]  Last DEXA was 2/2022. Has upcoming appt with Vicente on 2/27/23    COPD dx in past  Nebulizer machine  Bouts of bronchitis and will use the albuterol as needed.  Doesn't get bronchitis very often, was once or twice yearly, but improved. Now less often.  Doesn't use the albuterol outside of these bronchitis episodes.  Mucinex - if doesn't take this daily, will have goop build up in the back of her throat. If taking daily, no issues at " all  Past PFTs in 2021 were WNL    HLD  On atorvastatin.   Had last lipid panel on 1/2023 which was normal    Mood  Desvenlafaxine and buspirone. No side effects with buspirone.   PHQ of 5 and SPRING of 2. Doing well overall, considering current circumstance  Still seeing pscyhologist every 2 weeks.    GERD  Taking pantoprazole, was told could consider decrease to 20mg daily and then consider transition over to a H2B.  When on pantoprazole, only breakthrough symptoms about once monthly.  No dysphagia.    Allergies -   Tales claritin OTC    Atrovent -   was given by Dr. Byers.     RA  Methotrexate, upadacitinib and leucovorin.  Follows with rheumatology    SHERRY -   Wears CPAP consistently    Vitamin C, claritin and zinc OTC.    Was accidentally taking vitamin D 5000U BID. Stopped taking since high level at last appt with rheumatology last month    Have you ever done Advance Care Planning? (For example, a Health Directive, POLST, or a discussion with a medical provider or your loved ones about your wishes): No, advance care planning information given to patient to review.  Patient declined advance care planning discussion at this time.    Fall risk  Fallen 2 or more times in the past year?: No  Any fall with injury in the past year?: Yes     Early morning, walking the dog, bent over and when stand back up, lost balance and fell over, breaking bones. Currently in the TCU.     Cognitive Screening   PATIENT UNABLE TO COMPLETE DUE TO BEING BANDAGED     Mini-CogTM Copyright EDIL Oden. Licensed by the author for use in [facility]; reprinted with permission ([email or web address]). All rights reserved.      Do you have sleep apnea, excessive snoring or daytime drowsiness?: no    Reviewed and updated as needed this visit by clinical staff   Tobacco  Allergies  Meds  Problems  Med Hx  Surg Hx  Fam Hx          Reviewed and updated as needed this visit by Provider   Tobacco  Allergies  Meds  Problems  Med Hx  Surg  Hx  Fam Hx         Social History     Tobacco Use     Smoking status: Former     Packs/day: 1.00     Years: 41.00     Pack years: 41.00     Types: Cigarettes     Quit date: 1999     Years since quittin.1     Smokeless tobacco: Never     Tobacco comments:     former smoker   Substance Use Topics     Alcohol use: Yes     Alcohol/week: 0.0 standard drinks     Comment: Occasionally,  usually wine     If you drink alcohol do you typically have >3 drinks per day or >7 drinks per week? No    Alcohol Use 2023   Prescreen: >3 drinks/day or >7 drinks/week? -   Prescreen: >3 drinks/day or >7 drinks/week? No     Current providers sharing in care for this patient include:   Patient Care Team:  Duy Leyva MD as PCP - General (Family Practice)  Nico Byers MD as Assigned Rheumatology Provider  Annmarie Smith MD as Hospitalist (Endocrinology, Diabetes, and Metabolism)  Annmarie Smith MD as Assigned Endocrinology Provider  Sonu Lemons DPM as Referring Physician (Podiatry)  Diya Joseph PA-C as Physician Assistant (Dermatology)  Duy Leyva MD as Assigned PCP  Sonu Lemons DPM as Assigned Musculoskeletal Provider  Margaux Harley RPH as Pharmacist (Pharmacist)  Duy Leyva MD as Assigned Pain Medication Provider  [facility]  Siria Cerna APRN CNP as Nurse Practitioner (Geriatric Medicine)  Lucille Awad RN as Clinic Care Coordinator    The following health maintenance items are reviewed in Epic and correct as of today:  Health Maintenance   Topic Date Due     URINE DRUG SCREEN  Never done     EYE EXAM  2021     ANNUAL REVIEW OF HM ORDERS  2023     COVID-19 Vaccine (3 - Booster for Pfizer series) 2023 (Originally 2021)     A1C  2023     PHQ-9  2023     LIPID  2024     MEDICARE ANNUAL WELLNESS VISIT  2024     FALL RISK ASSESSMENT  2024     DEXA  2025     ADVANCE CARE  "PLANNING  02/21/2028     DTAP/TDAP/TD IMMUNIZATION (4 - Td or Tdap) 12/12/2028     DEPRESSION ACTION PLAN  Completed     INFLUENZA VACCINE  Completed     Pneumococcal Vaccine: 65+ Years  Completed     IPV IMMUNIZATION  Aged Out     MENINGITIS IMMUNIZATION  Aged Out     ZOSTER IMMUNIZATION  Discontinued     Mammogram Screening - Patient over age 75, has elected to discontinue screenings.  Pertinent mammograms are reviewed under the imaging tab.    Review of Systems   Constitutional: Negative for chills and fever.   HENT: Negative for congestion, ear pain, hearing loss and sore throat.    Eyes: Negative for pain and visual disturbance.   Respiratory: Negative for cough and shortness of breath.    Cardiovascular: Negative for chest pain, palpitations and peripheral edema.   Gastrointestinal: Negative for abdominal pain, constipation, diarrhea, heartburn, hematochezia and nausea.   Breasts:  Negative for tenderness, breast mass and discharge.   Genitourinary: Negative for dysuria, frequency, genital sores, hematuria, pelvic pain, urgency, vaginal bleeding and vaginal discharge.   Musculoskeletal: Negative for arthralgias, joint swelling and myalgias.   Skin: Negative for rash.   Neurological: Negative for dizziness, weakness, headaches and paresthesias.   Psychiatric/Behavioral: Positive for mood changes. The patient is nervous/anxious.      OBJECTIVE:   Pulse 78   Temp 97.8  F (36.6  C) (Oral)   Ht 1.651 m (5' 5\")   Wt 90.7 kg (200 lb)   SpO2 98%   BMI 33.28 kg/m   Estimated body mass index is 33.28 kg/m  as calculated from the following:    Height as of this encounter: 1.651 m (5' 5\").    Weight as of this encounter: 90.7 kg (200 lb).     Physical Exam  GENERAL: healthy, alert and no distress. Walking with walker.   HEAD: Normocephalic, atraumatic.   EYES: PERRL. Normal conjunctivae, sclera.   ENT: Normal EAC and TMs bilaterally.  Normal oropharynx.   NECK: Supple. No lymphadenopathy appreciated. Trachea midline. " Thyroid not enlarged, not TTP.  RESP: lungs clear to auscultation - no rales, rhonchi or wheezes  CV: regular rate and rhythm, normal S1 S2, no murmur, click, rub or gallop. No carotid bruits. No peripheral swelling noted.   ABDOMEN: soft, no TTP x4 quadrants. No hepatomegaly or masses appreciated. BS normactive.  MSK: no gross musculoskeletal defects noted.  SKIN: no suspicious lesions or rashes.  EXT: Warm and well perfused. Both arms in casts, bandaged.  NEURO: CNII-XII grossly intact. No focal deficits.  PSYCH: Groomed, dressed appropriately for weather. Normal mood with consistent affect.     ASSESSMENT / PLAN:   (Z00.00) Encounter for Medicare annual wellness exam (primary encounter diagnosis)    (E04.2) Multinodular goiter  Hx of multinodular goiter. Last US in 2020 with left sided nodule 2.7 cm and stable. Was referred to ENT in 2018 but patient elected not to biopsy as stable for past 8 years. Will order f/u US thyroid today. Discussed recommendation for likely FNA, patient to consider.  Plan: US Thyroid    (G47.33) SHERRY (obstructive sleep apnea)-severe (AHI 35)  (E66.01) Morbid obesity (H)  Uses CPAP. Obesity likely contributing.    (K21.9) Gastroesophageal reflux disease, unspecified whether esophagitis present  Doing well on pantoprazole. Infrequent breakthrough symptoms. No red flag symptoms. Plan to taper to 20mg pantoprazole for couple months then transition to H2B. To start plan after TCU discharge.     (E78.5) Hyperlipidemia LDL goal <100  On statin therapy. Last lipid WNL. Can refill when needed.    (M81.0) Osteoporosis, unspecified osteoporosis type, unspecified pathological fracture presence  Follows with endocrinology. Recent fractures, see below. Currently on IV bisphosphonate yearly - due for repeat infusion in one month. Last DEXA one year ago. Will plan to defer management to Endocrinology.    (S52.202A) Closed fracture of shaft of left ulna, unspecified fracture morphology, initial  "encounter  (S52.021A) Closed fracture of olecranon process of right ulna, initial encounter  Recent hospitalization, s/p ORIF, currently at TCU. Working with PT/OT. Briefly reviewed chart and recent labs, patient currently under care of TCU thus will defer to TCU team for current pain, lab, meds.  Reminded patient of importance of follow up visit after discharge.    (M05.741) Rheumatoid arthritis involving right hand with positive rheumatoid factor (H)  Follows with rheumatology. On methotrexate, upadacitinib, and leucovorin.     (D50.8) 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  Recent downtrend of hgb to 9.1 last check in setting of recent fall and surgery. Under care of TCU for management, recheck scheduled for 2/22 per note. Can consider iron supplementation if indicated.     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 Seeing therapist twice monthly. Continue.    Allergic rhinitis  Managed with OTC medication. Patient takes loratadine and guafenesin daily. Not currently using atrovent.    High serum vitamin D  Noted on last lab, was taking 5000U BID with subsequent elevated vitamin D level. Per rheumatology, was decreased to 1000U daily. Per TCU note, is still taking 5000U. Will reach out to discuss adjustment.    COUNSELING:  Reviewed preventive health counseling, as reflected in patient instructions      BMI:   Estimated body mass index is 33.28 kg/m  as calculated from the following:    Height as of this encounter: 1.651 m (5' 5\").    Weight as of this encounter: 90.7 kg (200 lb).     She reports that she quit smoking about 24 years ago. Her smoking use included cigarettes. She has a 41.00 pack-year smoking history. She has never used smokeless tobacco.    Appropriate preventive services were discussed with this patient, including applicable screening as appropriate for cardiovascular disease, diabetes, osteopenia/osteoporosis, " and glaucoma.  As appropriate for age/gender, discussed screening for colorectal cancer, prostate cancer, breast cancer, and cervical cancer. Checklist reviewing preventive services available has been given to the patient.    Reviewed patients plan of care and provided an AVS. The Complex Care Plan (for patients with higher acuity and needing more deliberate coordination of services) for Ginger meets the Care Plan requirement. This Care Plan has been established and reviewed with the Patient.          Duy Leyva MD  [facility]  2/21/2023    Identified Health Risks:    The patient was provided with suggestions to help her develop a healthy physical lifestyle.  She is at risk for lack of exercise and has been provided with information to increase physical activity for the benefit of her well-being.  The patient was counseled and encouraged to consider modifying their diet and eating habits. She was provided with information on recommended healthy diet options.  The patient reports that she has difficulty with activities of daily living. I have asked that the patient make a follow up appointment in 3-4 weeks where this issue will be further evaluated and addressed.  The patient was provided with suggestions to help her develop a healthy emotional lifestyle.  The patient's PHQ-9 score is consistent with mild depression. She was provided with information regarding depression and was advised to schedule a follow up appointment in 3-4 weeks to further address this issue.

## 2023-02-21 NOTE — PATIENT INSTRUCTIONS
Preventive Health Recommendations    See your health care provider every year to    Review health changes.     Discuss preventive care.      Review your medicines if your doctor has prescribed any.      You no longer need a yearly Pap test unless you've had an abnormal Pap test in the past 10 years. If you have vaginal symptoms, such as bleeding or discharge, be sure to talk with your provider about a Pap test.      Every 1 to 2 years, have a mammogram.  If you are over 69, talk with your health care provider about whether or not you want to continue having screening mammograms.      Every 10 years, have a colonoscopy. Or, have a yearly FIT test (stool test). These exams will check for colon cancer.       Have a cholesterol test every 5 years, or more often if your doctor advises it.       Have a diabetes test (fasting glucose) every three years. If you are at risk for diabetes, you should have this test more often.       At age 65, have a bone density scan (DEXA) to check for osteoporosis (brittle bone disease).    Shots:    Get a flu shot each year.    Get a tetanus shot every 10 years.    Talk to your doctor about your pneumonia vaccines. There are now two you should receive - Pneumovax (PPSV 23) and Prevnar (PCV 13).    Talk to your pharmacist about the shingles vaccine.    Talk to your doctor about the hepatitis B vaccine.    Nutrition:     Eat at least 5 servings of fruits and vegetables each day.      Eat whole-grain bread, whole-wheat pasta and brown rice instead of white grains and rice.      Get adequate about Calcium and Vitamin D.     Lifestyle    Exercise at least 150 minutes a week (30 minutes a day, 5 days a week). This will help you control your weight and prevent disease.      Limit alcohol to one drink per day.      No smoking.       Wear sunscreen to prevent skin cancer.       See your dentist twice a year for an exam and cleaning.      See your eye doctor every 1 to 2 years to screen for  conditions such as glaucoma, macular degeneration, cataracts, etc.    Personalized Prevention Plan  You are due for the preventive services outlined below.  Your care team is available to assist you in scheduling these services.  If you have already completed any of these items, please share that information with your care team to update in your medical record.    Health Maintenance Due   Topic Date Due     URINE DRUG SCREEN  Never done     Eye Exam  08/18/2021     ANNUAL REVIEW OF HM ORDERS  01/20/2023     Patient Education   Personalized Prevention Plan  You are due for the preventive services outlined below.  Your care team is available to assist you in scheduling these services.  If you have already completed any of these items, please share that information with your care team to update in your medical record.  Health Maintenance Due   Topic Date Due     URINE DRUG SCREEN  Never done     Eye Exam  08/18/2021     ANNUAL REVIEW OF HM ORDERS  01/20/2023     Your Health Risk Assessment indicates you feel you are not in good health    A healthy lifestyle helps keep the body fit and the mind alert. It helps protect you from disease, helps you fight disease, and helps prevent chronic disease (disease that doesn't go away) from getting worse. This is important as you get older and begin to notice twinges in muscles and joints and a decline in the strength and stamina you once took for granted. A healthy lifestyle includes good healthcare, good nutrition, weight control, recreation, and regular exercise. Avoid harmful substances and do what you can to keep safe. Another part of a healthy lifestyle is stay mentally active and socially involved.    Good healthcare     Have a wellness visit every year.     If you have new symptoms, let us know right away. Don't wait until the next checkup.     Take medicines exactly as prescribed and keep your medicines in a safe place. Tell us if your medicine causes problems.   Healthy  diet and weight control     Eat 3 or 4 small, nutritious, low-fat, high-fiber meals a day. Include a variety of fruits, vegetables, and whole-grain foods.     Make sure you get enough calcium in your diet. Calcium, vitamin D, and exercise help prevent osteoporosis (bone thinning).     If you live alone, try eating with others when you can. That way you get a good meal and have company while you eat it.     Try to keep a healthy weight. If you eat more calories than your body uses for energy, it will be stored as fat and you will gain weight.     Recreation   Recreation is not limited to sports and team events. It includes any activity that provides relaxation, interest, enjoyment, and exercise. Recreation provides an outlet for physical, mental, and social energy. It can give a sense of worth and achievement. It can help you stay healthy.    Mental Exercise and Social Involvement  Mental and emotional health is as important as physical health. Keep in touch with friends and family. Stay as active as possible. Continue to learn and challenge yourself.   Things you can do to stay mentally active are:    Learn something new, like a foreign language or musical instrument.     Play SCRABBLE or do crossword puzzles. If you cannot find people to play these games with you at home, you can play them with others on your computer through the Internet.     Join a games club--anything from card games to chess or checkers or lawn bowling.     Start a new hobby.     Go back to school.     Volunteer.     Read.   Keep up with world events.    Exercise for a Healthier Heart  You may wonder how you can improve the health of your heart. If you re thinking about exercise, you re on the right track. You don t need to become an athlete. But you do need a certain amount of brisk exercise to help strengthen your heart. If you have been diagnosed with a heart condition, your healthcare provider may advise exercise to help stabilize your  condition. To help make exercise a habit, choose safe, fun activities.      Exercise with a friend. When activity is fun, you're more likely to stick with it.   Before you start  Check with your healthcare provider before starting an exercise program. This is especially important if you have not been active for a while. It's also important if you have a long-term (chronic) health problem such as heart disease, diabetes, or obesity. Or if you are at high risk for having these problems.   Why exercise?  Exercising regularly offers many healthy rewards. It can help you do all of the following:     Improve your blood cholesterol level to help prevent further heart trouble    Lower your blood pressure to help prevent a stroke or heart attack    Control diabetes, or reduce your risk of getting this disease    Improve your heart and lung function    Reach and stay at a healthy weight    Make your muscles stronger so you can stay active    Prevent falls and fractures by slowing the loss of bone mass (osteoporosis)    Manage stress better    Reduce your blood pressure    Improve your sense of self and your body image  Exercise tips      Ease into your routine. Set small goals. Then build on them. If you are not sure what your activity level should be, talk with your healthcare provider first before starting an exercise routine.    Exercise on most days. Aim for a total of 150 minutes (2 hours and 30 minutes) or more of moderate-intensity aerobic activity each week. Or 75 minutes (1 hour and 15 minutes) or more of vigorous-intensity aerobic activity each week. Or try for a combination of both. Moderate activity means that you breathe heavier and your heart rate increases but you can still talk. Think about doing 40 minutes of moderate exercise, 3 to 4 times a week. For best results, activity should last for about 40 minutes to lower blood pressure and cholesterol. It's OK to work up to the 40-minute period over time. Examples  of moderate-intensity activity are walking 1 mile in 15 minutes. Or doing 30 to 45 minutes of yard work.    Step up your daily activity level.  Along with your exercise program, try being more active the whole day. Walk instead of drive. Or park further away so that you take more steps each day. Do more household tasks or yard work. You may not be able to meet the advised mount of physical activity. But doing some moderate- or vigorous-intensity aerobic activity can help reduce your risk for heart disease. Your healthcare provider can help you figure out what is best for you.    Choose 1 or more activities you enjoy.  Walking is one of the easiest things you can do. You can also try swimming, riding a bike, dancing, or taking an exercise class.    When to call your healthcare provider  Call your healthcare provider if you have any of these:     Chest pain or feel dizzy or lightheaded    Burning, tightness, pressure, or heaviness in your chest, neck, shoulders, back, or arms    Abnormal shortness of breath    More joint or muscle pain    A very fast or irregular heartbeat (palpitations)  [employer] last reviewed this educational content on 7/1/2019 2000-2021 [employer]. All rights reserved. This information is not intended as a substitute for professional medical care. Always follow your healthcare professional's instructions.          Understanding USDA MyPlate  The USDA has guidelines to help you make healthy food choices. These are called MyPlate. MyPlate shows the food groups that make up healthy meals using the image of a place setting. Before you eat, think about the healthiest choices for what to put on your plate or in your cup or bowl. To learn more about building a healthy plate, visit www.choosemyplate.gov.    The food groups    Fruits. Any fruit or 100% fruit juice counts as part of the Fruit Group. Fruits may be fresh, canned, frozen, or dried, and may be whole, cut-up, or pureed. Make  1/2 of your plate fruits and vegetables.    Vegetables. Any vegetable or 100% vegetable juice counts as a member of the Vegetable Group. Vegetables may be fresh, frozen, canned, or dried. They can be served raw or cooked and may be whole, cut-up, or mashed. Make 1/2 of your plate fruits and vegetables.    Grains. All foods made from grains are part of the Grains Group. These include wheat, rice, oats, cornmeal, and barley. Grains are often used to make foods such as bread, pasta, oatmeal, cereal, tortillas, and grits. Grains should be no more than 1/4 of your plate. At least half of your grains should be whole grains.    Protein. This group includes meat, poultry, seafood, beans and peas, eggs, processed soy products (such as tofu), nuts (including nut butters), and seeds. Make protein choices no more than 1/4 of your plate. Meat and poultry choices should be lean or low fat.    Dairy. The Dairy Group includes all fluid milk products and foods made from milk that contain calcium, such as yogurt and cheese. (Foods that have little calcium, such as cream, butter, and cream cheese, are not part of this group.) Most dairy choices should be low-fat or fat-free.    Oils. Oils aren't a food group, but they do contain essential nutrients. However it's important to watch your intake of oils. These are fats that are liquid at room temperature. They include canola, corn, olive, soybean, vegetable, and sunflower oil. Foods that are mainly oil include mayonnaise, certain salad dressings, and soft margarines. You likely already get your daily oil allowance from the foods you eat.  Things to limit  Eating healthy also means limiting these things in your diet:       Salt (sodium). Many processed foods have a lot of sodium. To keep sodium intake down, eat fresh vegetables, meats, poultry, and seafood when possible. Purchase low-sodium, reduced-sodium, or no-salt-added food products at the store. And don't add salt to your meals at  home. Instead, season them with herbs and spices such as dill, oregano, cumin, and paprika. Or try adding flavor with lemon or lime zest and juice.    Saturated fat. Saturated fats are most often found in animal products such as beef, pork, and chicken. They are often solid at room temperature, such as butter. To reduce your saturated fat intake, choose leaner cuts of meat and poultry. And try healthier cooking methods such as grilling, broiling, roasting, or baking. For a simple lower-fat swap, use plain nonfat yogurt instead of mayonnaise when making potato salad or macaroni salad.    Added sugars. These are sugars added to foods. They are in foods such as ice cream, candy, soda, fruit drinks, sports drinks, energy drinks, cookies, pastries, jams, and syrups. Cut down on added sugars by sharing sweet treats with a family member or friend. You can also choose fruit for dessert, and drink water or other unsweetened beverages.     [employer] last reviewed this educational content on 6/1/2020 2000-2021 [employer]. All rights reserved. This information is not intended as a substitute for professional medical care. Always follow your healthcare professional's instructions.        Activities of Daily Living    Your Health Risk Assessment indicates you have difficulties with activities of daily living such as housework, bathing, preparing meals, taking medication, etc. Please make a follow up appointment for us to address this issue in more detail.  Your Health Risk Assessment indicates you feel you are not in good emotional health.    Recreation   Recreation is not limited to sports and team events. It includes any activity that provides relaxation, interest, enjoyment, and exercise. Recreation provides an outlet for physical, mental, and social energy. It can give a sense of worth and achievement. It can help you stay healthy.    Mental Exercise and Social Involvement  Mental and emotional health is as  "important as physical health. Keep in touch with friends and family. Stay as active as possible. Continue to learn and challenge yourself.   Things you can do to stay mentally active are:    Learn something new, like a foreign language or musical instrument.     Play SCRABBLE or do crossword puzzles. If you cannot find people to play these games with you at home, you can play them with others on your computer through the Internet.     Join a games club--anything from card games to chess or checkers or lawn bowling.     Start a new hobby.     Go back to school.     Volunteer.     Read.   Keep up with world events.    Depression and Suicide in Older Adults    Nearly 2 million older Americans have some type of depression. Some of them even take their own lives. Yet depression among older adults is often ignored. Learn the warning signs. You may help spare a loved one needless pain. You may also save a life.   What is depression?  Depression is a common and serious illness that affects the way you think and feel. It is not a normal part of aging, nor is it a sign of weakness, a character flaw, or something you can snap out of. Most people with depression need treatment to get better. The most common symptom is a feeling of deep sadness. People who are depressed also may seem tired and listless. And nothing seems to give them pleasure. It s normal to grieve or be sad sometimes. But sadness lessens or passes with time. Depression rarely goes away or improves on its own. A person with clinical depression can't \"snap out of it.\" Other symptoms of depression are:     Sleeping more or less than normal    Eating more or less than normal    Having headaches, stomachaches, or other pains that don t go away    Feeling nervous,  empty,  or worthless    Crying a great deal    Thinking or talking about suicide or death    Loss of interest in activities previously enjoyed    Social isolation    Feeling confused or forgetful  What " causes it?  The causes of depression aren t fully known. But it is thought to result from a complex blend of these factors:     Biochemistry. Certain chemicals in the brain play a role.    Genes. Depression does run in families.    Life stress. Life stresses can also trigger depression in some people. Older adults often face many stressors, such as death of friends or a spouse, health problems, and financial concerns.    Chronic conditions. This includes conditions such as diabetes, heart disease, or cancer. These can cause symptoms of depression. Medicine side effects can cause changes in thoughts and behaviors.  How you can help  Often, depressed people may not want to ask for help. When they do, they may be ignored. Or, they may receive the wrong treatment. You can help by showing parents and older friends love and support. If they seem depressed, don t lecture the person, ignore the symptoms, or discount the symptoms as a  normal  part of aging -which they are not. Get involved, listen, and show interest and support.   Help them understand that depression is a treatable illness. Tell them you can help them find the right treatment. Offer to go to their healthcare provider's appointment with them for support when the symptoms are discussed. With their approval, contact a local mental health center, social service agency, or hospital about services.   You can be an advocate for him or her at healthcare appointments. Many older adults have chronic illnesses that can cause symptoms of depression. Medicine side effects can change thoughts and behaviors. You can help make sure that the healthcare provider looks at all of these factors. He or she should refer your family member or friend to a mental healthcare provider when needed. in some cases, untreated depression can lead to a misdiagnosis. A person may be diagnosed with a brain disorder such as dementia. If the healthcare provider does not take the issue of  depression seriously, help your family member or friend to find another provider.   Don't be afraid to ask  If you think an older person you care about could be suicidal, ask,  Have you thought about suicide?  Most people will tell you the truth. If they say  yes,  they may already have a plan for how and when they will attempt it. Find out as much as you can. The more detailed the plan, and the easier it is to carry out, the more danger the person is in right now. Tell the person you are there for them and do not want them to harm him or herself. Don't wait to get help for the person. Call the person's healthcare provider, local hospital, or emergency services.   To learn more    National Suicide Prevention Lifeline (crisis hotline) 539-902-BXPV (862-741-1290)    National [place] of Mental Imolub967-901-8427qeb.[facility].nih.gov    National [employer] on Mental Jzbeqdn695-894-9512rhg.[place].org    Mental Health Qvhdscc460-087-7724ejj.[facility].org    National Suicide Iboqsfg949-QVXNMRU (907-693-5654)    Call 911  Never leave the person alone. A person who is actively suicidal needs psychiatric care right away. They will need constant supervision. Never leave the person out of sight. Call 911 or the national 24-hour suicide crisis hotline at 740-045-YWJJ (028-843-2866). You can also take the person to the closest emergency room.   [employer] last reviewed this educational content on 5/1/2020 2000-2021 [employer]. All rights reserved. This information is not intended as a substitute for professional medical care. Always follow your healthcare professional's instructions.

## 2023-02-22 LAB
ANION GAP SERPL CALCULATED.3IONS-SCNC: 14 MMOL/L (ref 7–15)
BUN SERPL-MCNC: 11 MG/DL (ref 8–23)
CALCIUM SERPL-MCNC: 9.4 MG/DL (ref 8.8–10.2)
CHLORIDE SERPL-SCNC: 103 MMOL/L (ref 98–107)
CREAT SERPL-MCNC: 0.52 MG/DL (ref 0.51–0.95)
DEPRECATED HCO3 PLAS-SCNC: 25 MMOL/L (ref 22–29)
ERYTHROCYTE [DISTWIDTH] IN BLOOD BY AUTOMATED COUNT: 16.6 % (ref 10–15)
GFR SERPL CREATININE-BSD FRML MDRD: >90 ML/MIN/1.73M2
GLUCOSE SERPL-MCNC: 104 MG/DL (ref 70–99)
HCT VFR BLD AUTO: 32.2 % (ref 35–47)
HGB BLD-MCNC: 10 G/DL (ref 11.7–15.7)
MCH RBC QN AUTO: 29.5 PG (ref 26.5–33)
MCHC RBC AUTO-ENTMCNC: 31.1 G/DL (ref 31.5–36.5)
MCV RBC AUTO: 95 FL (ref 78–100)
PLATELET # BLD AUTO: 423 10E3/UL (ref 150–450)
POTASSIUM SERPL-SCNC: 4 MMOL/L (ref 3.4–5.3)
RBC # BLD AUTO: 3.39 10E6/UL (ref 3.8–5.2)
SODIUM SERPL-SCNC: 142 MMOL/L (ref 136–145)
WBC # BLD AUTO: 5.4 10E3/UL (ref 4–11)

## 2023-02-22 PROCEDURE — P9604 ONE-WAY ALLOW PRORATED TRIP: HCPCS | Performed by: NURSE PRACTITIONER

## 2023-02-22 PROCEDURE — 85027 COMPLETE CBC AUTOMATED: CPT | Performed by: NURSE PRACTITIONER

## 2023-02-22 PROCEDURE — 36415 COLL VENOUS BLD VENIPUNCTURE: CPT | Performed by: NURSE PRACTITIONER

## 2023-02-22 PROCEDURE — 80048 BASIC METABOLIC PNL TOTAL CA: CPT | Performed by: NURSE PRACTITIONER

## 2023-02-23 ENCOUNTER — VIRTUAL VISIT (OUTPATIENT)
Dept: PSYCHOLOGY | Facility: CLINIC | Age: 83
End: 2023-02-23
Payer: COMMERCIAL

## 2023-02-23 DIAGNOSIS — F33.1 MODERATE EPISODE OF RECURRENT MAJOR DEPRESSIVE DISORDER (H): Primary | ICD-10-CM

## 2023-02-23 PROCEDURE — 90834 PSYTX W PT 45 MINUTES: CPT | Mod: VID

## 2023-02-23 ASSESSMENT — PATIENT HEALTH QUESTIONNAIRE - PHQ9
SUM OF ALL RESPONSES TO PHQ QUESTIONS 1-9: 4
10. IF YOU CHECKED OFF ANY PROBLEMS, HOW DIFFICULT HAVE THESE PROBLEMS MADE IT FOR YOU TO DO YOUR WORK, TAKE CARE OF THINGS AT HOME, OR GET ALONG WITH OTHER PEOPLE: SOMEWHAT DIFFICULT
SUM OF ALL RESPONSES TO PHQ QUESTIONS 1-9: 4

## 2023-02-23 NOTE — PROGRESS NOTES
[facility]                                     Progress Note    Patient Name: Ginger Marhsall  Date: 2/23/23         Service Type: Individual      Session Start Time: 9:00 am  Session End Time: 9:45 am     Session Length: 45 min    Session #: 5    Attendees: Client attended alone    Service Modality:  Video Visit:      Provider verified identity through the following two step process.  Patient provided:  Patient is known previously to provider    Telemedicine Visit: The patient's condition can be safely assessed and treated via synchronous audio and visual telemedicine encounter.      Reason for Telemedicine Visit: Patient convenience (e.g. access to timely appointments / distance to available provider)    Originating Site (Patient Location): Patient's home    Distant Site (Provider Location): Provider Remote Setting- Home Office    Consent:  The patient/guardian has verbally consented to: the potential risks and benefits of telemedicine (video visit) versus in person care; bill my insurance or make self-payment for services provided; and responsibility for payment of non-covered services.     Patient would like the video invitation sent by:  My Chart    Mode of Communication:  Video Conference via [employer]    Distant Location (Provider):  Off-site    As the provider I attest to compliance with applicable laws and regulations related to telemedicine.    DATA  Interactive Complexity: No  Crisis: No        Progress Since Last Session (Related to Symptoms / Goals / Homework):   Symptoms: No change continued depression, grief/loss    Homework: Partially completed      Episode of Care Goals: Minimal progress - ACTION (Actively working towards change); Intervened by reinforcing change plan / affirming steps taken     Current / Ongoing Stressors and Concerns:  Fall while walking dog, injuries in both arms, elbow, wrist, rib. In rehab PT/OT to prepare for return to home. [facility]  working on  supports. Adult son who was blind and lived alone fell and passed away recently, second loss of a child this year. Noticing regrets about parenting. Distress regarding sister with Alzheimer's who has left local residence and is not in contact. Limited food intake, ability to get things done around the house.     Treatment Objective(s) Addressed in This Session:    Practice boundaries and radical acceptance of reality regarding sister, sons, reality of accident  Improve quantity and quality of night time sleep / decrease daytime naps     Intervention:      DBT:  Reviewed radical acceptance and boundaries to support mental health  Provided active listening and validation. Processed sadness and loss, surfaced impact of son on others.  Reflected on behavioral patterns and regrets over time as a parent.     Motivational Interviewing  Target Behavior: radical acceptance of reality, gratitude    Stage of Change: ACTION (Actively working towards change)    MI Intervention: Expressed Empathy/Understanding, Supported Autonomy, Collaboration, Evocation and Open-ended questions     Change Talk Expressed by the Patient: Desire to change Reasons to change Activation    Provider Response to Change Talk: E - Evoked more info from patient about behavior change and A - Affirmed patient's thoughts, decisions, or attempts at behavior change    Assessments completed prior to visit:  LAURA  12/5/22  The following assessments were completed by patient for this visit:  PHQ2:   PHQ-2 ( 1999 [employer]) 2/20/2023 1/15/2023 11/16/2022 8/5/2022 7/6/2022 3/23/2022 1/18/2022   Q1: Little interest or pleasure in doing things 1 1 1 3 - 1 1   Q2: Feeling down, depressed or hopeless 1 1 1 0 - 0 0   PHQ-2 Score 2 2 2 3 - 1 1   PHQ-2 Total Score (12-17 Years)- Positive if 3 or more points; Administer PHQ-A if positive - - - - - - -   Q1: Little interest or pleasure in doing things Several days Several days Several days Nearly every day - Several days Several  days   Q2: Feeling down, depressed or hopeless Several days Several days Several days Not at all - Not at all Not at all   PHQ-2 Score 2 2 2 3 Incomplete 1 1     PHQ9:   PHQ-9 SCORE 10/21/2022 11/16/2022 12/5/2022 12/12/2022 1/9/2023 2/21/2023 2/23/2023   PHQ-9 Total Score - - - - - - -   PHQ-9 Total Score MyChart 14 (Moderate depression) 5 (Mild depression) 9 (Mild depression) 7 (Mild depression) 7 (Mild depression) - 4 (Minimal depression)   PHQ-9 Total Score 14 5 9 7 7 5 4     GAD2:   SPRING-2 12/5/2022 12/5/2022 1/3/2023 2/2/2023 2/23/2023   Feeling nervous, anxious, or on edge 1 1 - 1 1   Not being able to stop or control worrying 1 1 1 1 0   SPRING-2 Total Score 2 2 - 2 1     GAD7:   SPRING-7 SCORE 2/10/2017 2/22/2018 6/5/2019 6/16/2020 7/6/2022 8/5/2022 11/16/2022   Total Score - - - - 2 (minimal anxiety) 5 (mild anxiety) 2 (minimal anxiety)   Total Score 1 0 0 7 2 5 2     CAGE-AID:   CAGE-AID Total Score 12/5/2022   Total Score 0   Total Score MyChart 0 (A total score of 2 or greater is considered clinically significant)     PROMIS 10-Global Health (only subscores and total score):   PROMIS-10 Scores Only 9/21/2017 11/15/2018 3/25/2022 12/5/2022 12/5/2022   Global Mental Health Score 13 12 12 8 8   Global Physical Health Score 14 12 12 13 13   PROMIS TOTAL - SUBSCORES 27 24 24 21 21     [place] Suicide Severity Rating Scale (Lifetime/Recent)  [place] Suicide Severity Rating (Lifetime/Recent) 12/5/2022   Q1 Wished to be Dead (Past Month) no   Q2 Suicidal Thoughts (Past Month) no   Q3 Suicidal Thought Method no   Q4 Suicidal Intent without Specific Plan no   Q5 Suicide Intent with Specific Plan no   Q6 Suicide Behavior (Lifetime) no   Level of Risk per Screen low risk         ASSESSMENT: Current Emotional / Mental Status (status of significant symptoms):   Risk status (Self / Other harm or suicidal ideation)   Patient denies current fears or concerns for personal safety.   Patient denies current or recent suicidal  ideation or behaviors.   Patient denies current or recent homicidal ideation or behaviors.   Patient denies current or recent self injurious behavior or ideation.   Patient denies other safety concerns.   Patient reports there has been no change in risk factors since their last session.     Patient reports there has been no change in protective factors since their last session.     Recommended that patient call 911 or go to the local ED should there be a change in any of these risk factors.     Appearance:   Appropriate    Eye Contact:   Good    Psychomotor Behavior: Normal    Attitude:   Pleasant Attentive   Orientation:   All   Speech    Rate / Production: Emotional Normal     Volume:  Normal    Mood:    Depressed    Affect:    Appropriate    Thought Content:  Clear    Thought Form:  Coherent  Logical  Circumstantial   Insight:    Good      Medication Review:   No changes to current psychiatric medication(s)     Medication Compliance:   Yes     Changes in Health Issues:   None reported     Chemical Use Review:   Substance Use: Chemical use reviewed, no active concerns identified      Tobacco Use: No change in amount of tobacco use since last session.  Patient declined discussion at this time    Diagnosis:  1. Moderate episode of recurrent major depressive disorder (H)        Collateral Reports Completed:   Not Applicable    PLAN: (Patient Tasks / Therapist Tasks / Other)   Embrace radical acceptance and boundaries.     Rocío Arenas, [facility] 2/23/23                                                         ______________________________________________________________________  Answers for HPI/ROS submitted by the patient on 1/9/2023  If you checked off any problems, how difficult have these problems made it for you to do your work, take care of things at home, or get along with other people?: Somewhat difficult  PHQ9 TOTAL SCORE: 7                                              Individual Treatment Plan    Patient's  Name: Ginger Marshall  YOB: 1940    Date of Creation: 2/8/23  Date Treatment Plan Last Reviewed/Revised:     DSM5 Diagnoses: 296.31 (F33.0) Major Depressive Disorder, Recurrent Episode, Mild With anxious distress  Psychosocial / Contextual Factors: aging, losses, generational trauma  PROMIS (reviewed every 90 days):   21 12/5/22    Referral / Collaboration:  Referral to another professional/service is not indicated at this time..    Anticipated number of session for this episode of care: 6-9 sessions  Anticipation frequency of session: Every other week  Anticipated Duration of each session: 38-52 minutes  Treatment plan will be reviewed in 90 days or when goals have been changed.       MeasurableTreatment Goal(s) related to diagnosis / functional impairment(s)  Goal 1: Patient will experience an improvement in mood and functioning as evidenced by assessment scores, self report and clinician observation    I will know I've met my goal when things feel better (paraphrase).      Objective #A (Patient Action)    Patient will increase understanding of steps in the grief process   Talk to others about losses  Use prayer practices and jena community to support well being.   Practice boundaries and radical acceptance of reality regarding sister sons, reality of accident  Status: New - Date: 2/8/23     Intervention(s)  Therapist will teach CBT, DBT and support grief processing skills, prayer practices.    Objective #B  Patient will Increase interest, engagement, and pleasure in doing things  Decrease frequency and intensity of feeling down, depressed, hopeless  Improve quantity and quality of night time sleep / decrease daytime naps  Feel less tired and more energy during the day   Improve diet, appetite, mindful eating, and / or meal planning  Identify negative self-talk and behaviors: challenge core beliefs, myths, and actions  Improve concentration, focus, and mindfulness in daily activities   Feel less  fidgety, restless or slow in daily activities / interpersonal interactions.  Status: New - Date: 2/8/23     Intervention(s)  Therapist will teach cbt, dbt,MI, mindfulness and behavioral activation and assign homework.      Patient has reviewed and agreed to the above plan.      Rocío Arenas, [facility]  February 8, 2023

## 2023-02-24 ENCOUNTER — LAB REQUISITION (OUTPATIENT)
Dept: LAB | Facility: CLINIC | Age: 83
End: 2023-02-24
Payer: COMMERCIAL

## 2023-02-24 DIAGNOSIS — U07.1 COVID-19: ICD-10-CM

## 2023-02-24 PROCEDURE — U0005 INFEC AGEN DETEC AMPLI PROBE: HCPCS | Performed by: NURSE PRACTITIONER

## 2023-02-24 ASSESSMENT — ENCOUNTER SYMPTOMS
FLANK PAIN: 0
SINUS PAIN: 0
HEADACHES: 0
SEIZURES: 0
HEARTBURN: 0
MUSCLE CRAMPS: 0
INSOMNIA: 0
TREMORS: 0
NAUSEA: 0
DIARRHEA: 0
RECTAL PAIN: 0
SKIN CHANGES: 0
DIFFICULTY URINATING: 0
JAUNDICE: 0
NECK MASS: 0
ARTHRALGIAS: 0
NECK PAIN: 0
CONSTIPATION: 1
BOWEL INCONTINENCE: 0
DYSURIA: 0
PANIC: 0
LOSS OF CONSCIOUSNESS: 0
TROUBLE SWALLOWING: 0
SORE THROAT: 0
WEAKNESS: 0
DECREASED CONCENTRATION: 0
MYALGIAS: 0
JOINT SWELLING: 0
EYE REDNESS: 0
NAIL CHANGES: 1
TASTE DISTURBANCE: 0
EYE WATERING: 1
MUSCLE WEAKNESS: 0
BLOOD IN STOOL: 0
BACK PAIN: 1
POOR WOUND HEALING: 0
PARALYSIS: 0
DISTURBANCES IN COORDINATION: 1
MEMORY LOSS: 0
VOMITING: 0
DOUBLE VISION: 0
BLOATING: 0
EYE IRRITATION: 1
EYE PAIN: 0
SPEECH CHANGE: 0
SINUS CONGESTION: 0
SMELL DISTURBANCE: 0
ABDOMINAL PAIN: 0
DIZZINESS: 1
NUMBNESS: 0
TINGLING: 0
NERVOUS/ANXIOUS: 1
DEPRESSION: 1
STIFFNESS: 0
HOARSE VOICE: 0
HEMATURIA: 0

## 2023-02-25 LAB — SARS-COV-2 RNA RESP QL NAA+PROBE: NEGATIVE

## 2023-02-27 ENCOUNTER — PATIENT OUTREACH (OUTPATIENT)
Dept: CARE COORDINATION | Facility: CLINIC | Age: 83
End: 2023-02-27

## 2023-02-27 ENCOUNTER — VIRTUAL VISIT (OUTPATIENT)
Dept: ENDOCRINOLOGY | Facility: CLINIC | Age: 83
End: 2023-02-27
Payer: COMMERCIAL

## 2023-02-27 ENCOUNTER — TELEPHONE (OUTPATIENT)
Dept: ENDOCRINOLOGY | Facility: CLINIC | Age: 83
End: 2023-02-27

## 2023-02-27 ENCOUNTER — LAB REQUISITION (OUTPATIENT)
Dept: LAB | Facility: CLINIC | Age: 83
End: 2023-02-27
Payer: COMMERCIAL

## 2023-02-27 DIAGNOSIS — U07.1 COVID-19: ICD-10-CM

## 2023-02-27 DIAGNOSIS — M81.0 OSTEOPOROSIS, UNSPECIFIED OSTEOPOROSIS TYPE, UNSPECIFIED PATHOLOGICAL FRACTURE PRESENCE: Primary | Chronic | ICD-10-CM

## 2023-02-27 DIAGNOSIS — M81.8 IDIOPATHIC OSTEOPOROSIS: ICD-10-CM

## 2023-02-27 PROCEDURE — 99214 OFFICE O/P EST MOD 30 MIN: CPT | Mod: VID | Performed by: INTERNAL MEDICINE

## 2023-02-27 PROCEDURE — U0005 INFEC AGEN DETEC AMPLI PROBE: HCPCS | Performed by: NURSE PRACTITIONER

## 2023-02-27 NOTE — PROGRESS NOTES
Clinic Care Coordination Contact  Care Conference    Care Coordinator attended the Care Conference   on phone    Patient enrolled in Ambulatory Care Coordination No     Jamila - patient's daughter. She will check to see if patient still has platform walker to use at home. Health insurance typically covers DME for mobility every 5 years, so they will consider if they want to obtain platform walker through health insurance or not if they do not have one already at home.     Restorationism with therapies: patient requires stand by assist with bed mobility, transfers, ambulation. Independent with bathing, upper body dressing. Set up with toileting, modified independent with lower body dressing. SLUMS: 29/30 - no cognitive impairments.     Shandra - nursing: DNR/DNI code status. Planning to complete POLST. Ortho follow up appointment scheduled today at 2:00 pm. Using CPAP.   Medication list discussed and reviewed.   TCU will check if Mucinex is DM.   Plan to discontinue Dilaudid as patient is not using and doesn't need.   Also planning to change stool softener Miralax & Senna to PRN.     Sneha -  - health insurance is due for TCU review today, may possibly provide last coverage day as March 1st - then looking at TCU discharge for March 2nd. Plan to order home care at discharge.     Patient aware of [facility] health insurance benefits and is already receiving mom's meals.   TCU provider discharge follow up appointment scheduled for Monday, March 6th at 11:10 am check in.     RNCC will plan to follow up with patient to complete full initial clinic care coordination and transitional care management outreach assessments within 1-2 business days of TCU discharge. RNCC will remain available as needed.     Lucille Awad RN Care Coordinator  [facility] [place]  Email: [email or web address]  Phone: [phone]

## 2023-02-27 NOTE — TELEPHONE ENCOUNTER
[facility] Call Center    Phone Message    May a detailed message be left on voicemail: yes     Reason for Call: Other: Patient is having trouble with  internet connection please reach out for assitance      Action Taken: Other: ENDO    Travel Screening: Not Applicable

## 2023-02-27 NOTE — NURSING NOTE
PT Reports in TCU unit    Patient denies any changes since echeck-in regarding medication and allergies and states all information entered during echeck-in remains accurate.    Is the patient currently in the state of [place]? YES    Visit mode:VIDEO    If the visit is dropped, the patient can be reconnected by: VIDEO VISIT: Text to cell phone: [phone]    Will anyone else be joining the visit? NO      How would you like to obtain your AVS? MyChart    Are changes needed to the allergy or medication list? NO    Reason for visit: Osteo follow up

## 2023-02-27 NOTE — LETTER
"    2/27/2023         RE: Ginger Marshall  [address]  [facility] [address]        Dear Colleague,    Thank you for referring your patient, Ginger Marshall, to the [facility]. Please see a copy of my visit note below.    THIS IS A VIDEO VISIT:    Phone call visit/virtual visit encounter:    Name of patient: Ginger Marshall    Date of encounter: 2/27/2023    Time of start of video visit: 8:56    Video started: 9:08    Video ended: 9:20    Provider location: working from home/ [facility]    Patient location: patients home.    Mode of transmission: video/ Doximity    Verbal consent: obtained before starting visit. Pt is agreeable.      The patient has been notified of following:      \"This VIDEO visit will be conducted via a call between you and your physician/provider. We have found that certain health care needs can be provided without the need for a physical exam.  This service lets us provide the care you need with a short phone conversation.  If a prescription is necessary we can send it directly to your pharmacy.  If lab work is needed we can place an order for that and you can then stop by our lab to have the test done at a later time.     With new updates with corona virus patient might be billed as clinic visit.     If during the course of the call the physician/provider feels a telephone visit is not appropriate, you will not be charged for this service.\"      Past medical history, social history, family history, allergy and medications were reviewed and updated as appropriate.  Reviewed pertinent labs, notes, imaging studies personally.    Name: Ginger Marshall  Seen for f/u of osteoporosis.     HPI:  Ginger Marshall is a 82 year old female who presents for the evaluation of osteoporosis.   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     Was seen by  previously.    Since last visit she sustained right olecranon and left distal radius fracture in 2/2023 after a fall. S/p ORIF of right elbow and left wrist fracture on February 12, 2023.      DEXA 2/2022: Osteoporosis. There has been no significant change in bone density of the hip(s). The left forearm was not included on the previous study so comparison is not possible.    The spine is not acceptable for evaluation due to previous surgical changes.    The right femur is not acceptable for evaluation due to previous arthroplasty.     RISK FACTORS: Post-menopausal, Height loss of 3.5 inches, Parent history of osteoporosis with a hip fracture, Fractures of wrist and pelvis,  Condition related to bone loss: rheumatoid arthritis, long term steroid treatment, follow up osteoporosis    CURRENT TREATMENT:  Calcium, Vitamin D, previous Reclast.  2013, 2014, 2016.   ( No Reclast [id number])  Last Reclast was 3/2022. ( it was restarted at that time by PCP)  Tolerating OK.    GERD: on PPI. Under good control.    DENTAL: ok. No procedure planned. Has partial dentures.    KIDNEY function: within normal limits    Steroid: h/o  rheumatoid arthritis, was long term steroid treatment, was on prednisone for many year. She has been on steroid since 1980s Last use was around 2019. Now she is on Methotrexate.    Initially diagnosed with osteopenia in 2010, 2013,2015.  DEXA   c/w osteoporosis.      Smoke:former smoker, quit in .  Family History:Yes: mother and sister with osteoporosis.  Menstrual history/Birthing: s/p menopause. NO HRT. Wrist fractures after fall.  Fractures:h/o pelvic fracture. . She had sacral fracture in . Treated non surgically. H/o non traumatic displaced inferior pubic ramus fracture in 2015. H/o right wrist fracture.  Kidney stones: No  GI Surgery:No  Duration of therapy: Reclast as noted above  Exercise: Not much. Walks her dog. Has back pain.  Diet: dairy 1-2 servings/day.  Ca/Vitamin D: calcium- 600 mg once a day , Vit D 1000 international unit(s) BID  Alcohol: social  Eating Disorder: No    PMH/PSH:  Past Medical History:   Diagnosis Date     Acute posthemorrhagic anemia 10/13/2012     Closed fracture of multiple ribs of left side, initial encounter 2019     COPD (chronic obstructive pulmonary disease) (H)     no longer occurring     Ex-smoker 1999     Gastroenteritis 2020    Gastroenteritis with norovirus     Herniated nucleus pulposus, L3-4 3/1/2017     Hip joint replacement by other means 07/10/2008     History of blood transfusion      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     History of total knee replacement 2009     Menopause 1989    late 40's     Migraine 2014    resolved     Multinodular goiter      Other chronic pain     joints     Pelvic fracture (H) 2014     Rheumatic mitral stenosis      Rheumatoid arteritis (H)      S/P lumbar fusion 2017     Sleep apnea      Vitamin B12 deficiency      Past Surgical History:   Procedure Laterality Date     ABDOMEN SURGERY      c sections     ARTHROSCOPY KNEE Left      ARTHROSCOPY KNEE RT/LT  2006    left     BACK SURGERY  2013    disc     BIOPSY BREAST       BREAST SURGERY      lumpectomy s     BREAST SURGERY      lumpectomy     CATARACT EXTRACTION Bilateral      CATARACT IOL, RT/LT Bilateral 2010 aproximately       SECTION  1966      SECTION  1972     COLONOSCOPY  2009,     COLONOSCOPY       FINGER SURGERY Right 2019    Procedure: DISTAL ULNA RESECTION, RIGHT MIDDLE SILASTIC METACARPALPHALANGEAL EXCHANGE AND RIGHT ELBOW NODULE EXCISION.;  Surgeon: Hilario Redmond MD;  Location: [facility];  Service: Orthopedics     FOOT SURGERY Left      GYN SURGERY  66,72     HAND SURGERY Right      HAND SURGERY Right      HC ESOPH/GAS REFLUX TEST W NASAL IMPED >1 HR  2012    Procedure:ESOPHAGEAL IMPEDENCE FUNCTION TEST WITH 24 HOUR PH GREATER THAN 1 HOUR; Surgeon:KAYKAY JULIO; Location:[facility]     IR LUMBAR EPIDURAL STEROID INJECTION       IR TRANSLAMINAR EPIDURAL LUMBAR INJ INCL IMAGING  2012    [facility] L5-S1 at [facility]     LUMBAR FUSION       OPEN REDUCTION INTERNAL FIXATION ELBOW Right 2023    Procedure: OPEN REDUCTION INTERNAL FIXATION, RIGHT OLECRANON FRACTURE;  Surgeon: Pili Brock MD;  Location:  OR     OPEN REDUCTION INTERNAL FIXATION WRIST Left 2023    Procedure: OPEN REDUCTION INTERNAL FIXATION, LEFT DISTAL RADIUS FRACTURE;  Surgeon: Pili Brock MD;  Location:  OR     OPTICAL TRACKING SYSTEM FUSION POSTERIOR SPINE LUMBAR N/A 2017    Procedure: OPTICAL TRACKING SYSTEM FUSION SPINE POSTERIOR LUMBAR ONE LEVEL;  Surgeon: Anthony Michele MD;  Location:  OR     ORTHOPEDIC SURGERY      left foot surgery     ORTHOPEDIC SURGERY      R MCP surgery     ORTHOPEDIC SURGERY      right knee total replacement     TOTAL HIP ARTHROPLASTY Right      TOTAL KNEE ARTHROPLASTY Left      TOTAL KNEE ARTHROPLASTY Right      ZZC ANESTH,TOTAL HIP ARTHROPLASTY      Rt hip     ZZC HAND/FINGER SURGERY UNLISTED  2005    right hand     ZZC TOTAL KNEE ARTHROPLASTY      left     Family Hx:  Family History   Problem Relation Age of Onset     Arthritis Mother      Alzheimer Disease Mother      Hyperlipidemia Mother      Osteoporosis Mother      Heart  Disease Father         MI ( from this)     Alcohol/Drug Father      Arthritis Sister      Hypertension Sister      Other Cancer Sister         Luekemia     Cancer Son      Diabetes Son         type 1     Neurologic Disorder Sister         Schizophrenic     Hypertension Sister      Hyperlipidemia Sister      Mental Illness Sister      Diabetes Son      Other Cancer Son      Substance Abuse Son      Glaucoma Daughter      Diabetes Other         type 2     Hyperlipidemia Other      Social Hx:  Social History     Socioeconomic History     Marital status: Single     Spouse name: Not on file     Number of children: 3     Years of education: Not on file     Highest education level: Not on file   Occupational History     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 No      Service Not Asked     Blood Transfusions Not Asked     Caffeine Concern Yes     Comment: She has 8 cups of coffee in the AM and is done by 8-8:30 AM.     Occupational Exposure Not Asked     Hobby Hazards Not Asked     Sleep Concern Not Asked     Stress Concern Not Asked     Weight Concern Not Asked     Special Diet Not Asked     Back Care Not Asked     Exercise Yes     Comment: Sometimes.  [employer] comes 3 times a week to her building.     Bike Helmet Not Asked     Seat Belt Not Asked     Self-Exams Not Asked   Social History  Narrative    She lives in a a senior living apartment building.     Social Determinants of Health     Financial Resource Strain: Not on file   Food Insecurity: Not on file   Transportation Needs: Not on file   Physical Activity: Not on file   Stress: Not on file   Social Connections: Not on file   Intimate Partner Violence: Not on file   Housing Stability: Not on file          MEDICATIONS:  has a current medication list which includes the following prescription(s): acetaminophen, albuterol, atorvastatin, buspirone, calcium citrate, carboxymethylcellulose pf, cholecalciferol, desvenlafaxine, gabapentin, guaifenesin, hydromorphone, ipratropium, leucovorin, lidocaine, loratadine, rasuvo, naproxen sodium, pantoprazole, polyethylene glycol, senna-docusate, sucralfate, triamcinolone, rinvoq, vitamin c, zinc gluconate, and zoledronic acid.    ROS     ROS: 10 point ROS neg other than the symptoms noted above in the HPI.    Physical Exam   VS: There were no vitals taken for this visit.  GENERAL: healthy, alert and no distress  EYES: Eyes grossly normal to inspection, conjunctivae and sclerae normal  ENT: no nose swelling, nasal discharge.  Thyroid: no apparent thyroid nodules  RESP: no audible wheeze, cough, or visible cyanosis.  No visible retractions or increased work of breathing.  Able to speak fully in complete sentences.  ABDO: not evaluated.  EXTREMITIES: no hand tremors.  NEURO: Cranial nerves grossly intact, mentation intact and speech normal  SKIN: No apparent skin lesions, rash or edema seen   PSYCH: mentation appears normal, affect normal/bright, judgement and insight intact, normal speech and appearance well-groomed    LABS:  Creatinine:  Creatinine   Date Value Ref Range Status   02/22/2023 0.52 0.51 - 0.95 mg/dL Final   05/10/2021 0.59 0.52 - 1.04 mg/dL Final     TFTs:  Lab Results   Component Value Date    TSH 1.12 11/27/2020       LFTs:  Liver Function Studies -   Recent Labs   Lab Test 02/04/22  0832    PROTTOTAL 7.7   ALBUMIN 4.1   BILITOTAL 0.5   ALKPHOS 46   AST 13   ALT 25       PTH: 27    Vitamin D:  Vitamin D Deficiency Screening Results:  Lab Results   Component Value Date    VITDT 95 (H) 01/16/2023    VITDT 62 10/13/2021    VITDT 63 08/18/2021    VITDT 48 12/18/2020    VITDT 41 03/06/2019       BoneTurnOverMarkers:    DEXA:  DEXA 2/2022: Osteoporosis. There has been no significant change in bone density of the hip(s). The left forearm was not included on the previous study so comparison is not possible.    The spine is not acceptable for evaluation due to previous surgical changes.    The right femur is not acceptable for evaluation due to previous arthroplasty.     All pertinent notes, labs, and images personally reviewed by me.     A/P  Ms.Ruth SIM Marshall is a 81 year old here for the evaluation of:    #1. Osteoporosis:  RISK FACTORS:  Post-menopausal, Height loss of 3.5 inches, Parent history of osteoporosis with a hip fracture, Fractures of wrist and pelvis,  Condition related to bone loss: rheumatoid arthritis, long term steroid treatment, follow up osteoporosis  DEXA 2/2022: Osteoporosis. There has been no significant change in bone density of the hip(s). The left forearm was not included on the previous study so comparison is not possible.    Previously treated wiht Reclast.  2013, 2014, 2016.   Last Reclast was 3/2022. (It was restarted at that time by PCP)  ( No Reclast [id number])  H/o GERD: on PPI. Under good control.  DENTAL: ok. No procedure planned. Has partial dentures.  KIDNEY function: within normal limits  Plan:  Discussed diagnosis, pathophysiology, management and treatment options of condition with pt.  Plan to continue RECLAST as scheduled in 3/21/23.  DEXA in 2/2024 (if possible at [place])  Follow up after that.    The pt was advised to    Maintain an adequate calcium and vitamin D intake and to supplement vitamin D if needed to maintain serum levels of 25 hydroxy D (25 OH D) between  30-60 ng/ml.    Limit alcohol intake to no more than 2 servings per day.    Limit caffeine intake.    Maintain an active lifestyle including weight-bearing exercises for at least 30 mins daily.    Take measures to reduce the risk of falling.    More than 50% of the time spent with Ms. Sweats on counseling / coordinating her care.      All questions were answered.  The patient indicates understanding of the above issues and agrees with the plan set forth.    Follow-up:  1 year.    Annmarie Smith MD  Endocrinology  [facility]  CC: Gabriela Jefferson        Again, thank you for allowing me to participate in the care of your patient.        Sincerely,        Annmarie Smith MD

## 2023-02-27 NOTE — TELEPHONE ENCOUNTER
Plan to continue RECLAST as scheduled in 3/21/23.  DEXA in 2/2024 (if possible at [place])  Follow up after that.

## 2023-02-27 NOTE — PATIENT INSTRUCTIONS
[facility]  Dr Smith, Endocrinology Department    [place] [address] [id number]  [place] [address]  Appointment [phone]  Fax: [phone]  [place]: Monday - Thursday      Plan to continue RECLAST as scheduled in 3/21/23.  DEXA in 2024 (if possible at [place])  Follow up after that.    Reclast (bisphosphonate)--Treatment with bisphosphonate therapy will decrease fracture risk 50-70%.   There is risk of osteonecrosis of the jaw in patients using bisphosphonates is approximately 1700-1/100,000, with development most likely related to invasive dental procedures. If an invasive dental procedure was necessary, preferably stop the bisphosphonate approximately 3 months prior to reduce the risk. Let your dentist know that you are on this medication.  The data available at this time suggests that there is probably a small increase risk of atypical (nontraumatic) subtrochanteric fractures of the femur in patients on bisphosphonate therapy compared to those not on it. One large study suggested that for every 100 fractures prevented with bisphosphonate therapy, less than one femur fracture will occur. Other studies suggest one episode per 2,500 patient years. Patient should call with leg pain.      The pt was advised to  Maintain an adequate calcium and vitamin D intake and to supplement vitamin D if needed to maintain serum levels of 25 hydroxy D (25 OH D) between 30-60 ng/ml.  Limit alcohol intake to no more than 2 servings per day.  Limit caffeine intake.  Maintain an active lifestyle including weight-bearing exercises for at least 30 mins daily.  Take measures to reduce the risk of falling.    You should get 1000- 1200 mg/day calcium in divided doses of no more than 500 mg/dose.  This INCLUDES what is in your food as well as what is in any supplements you may be taking.    Vit D about 800-1000 IU/day ( unless you have vit D deficiency- in that case  higher dose)  Dietary sources of calcium:: These also contain vitamin D  Milk                            8 oz            300 mg calcium  Yogurt                          1 cup           400 mg calcium   Hard cheese                     1.5 oz          300 mg  Cottage cheese                  2 cup           300 mg  Orange juice with Calcium       8 oz            300 mg  Low fat dairy sources are recommended      You should get 30 minutes of moderate weight bearing exercise on most days of the week .  Weight bearing exercise includes such things as walking, jogging, hiking, dancing.  You should also get Strength training 2 or more times/week in addition to other weight -being exercise. Strength training uses weight or resistance beyond that seen in everyday activities -(pilates, weight training with free weights, weight machines or resistance bands)    Living with Osteoporosis: Preventing Fractures  If you have osteoporosis, you can do a lot to reduce its effect on your life. Knowing how to prevent fractures and spinal curvature can help you live more comfortably and safely with this disease.  Reducing your risk for fractures  The most common fracture sites in people with osteoporosis are the wrist, spine, and hip. These fractures are often caused by accidents and falls. All fractures are painful and may limit what you can do. But hip fractures are very serious. They often need surgery, and it can take months to recover. To reduce your risk for fractures:  Get regular exercise. Try walking, swimming, or weight training.  Eat foods that are rich in calcium, or take calcium supplements.  Make your home safe to help avoid accidents.  Take extra precautions not to fall in risky areas, such as icy sidewalks.  Understanding spinal fractures  Your spine is made up of many bones called vertebrae. Osteoporosis can cause the vertebrae in your spine to collapse. As a result, your upper back may arch forward, creating a curvature.  Spine fractures may also result from back strain and bad posture. You will also lose height. Your lower spine must then adjust to keep your body balanced. This can cause back pain. To prevent or lessen these spinal changes:  Practice good posture.  Use proper techniques if you need to lift heavy objects.  Do back exercises to help your posture.  Lie on your back when you have pain.  Ask your healthcare provider about these and other ways to help your spine.  [employer] last reviewed this educational content on 5/1/2018 2000-2021 [employer]. All rights reserved. This information is not intended as a substitute for professional medical care. Always follow your healthcare professional's instructions.          Living with Osteoporosis: Regular Exercise  If you have osteoporosis, exercise is vital for your health. It can prevent bone fractures and spine changes. It will slow bone loss. Exercise will strengthen your body. It can also be fun. A variety of exercises is best. See below for exercises that can help you. But before you start, talk with your healthcare provider to be sure these exercises are right for you.   Resistance exercises. These build muscle strength and maintain bone mass. They also make you less prone to injury. Exercises include lifting small weights, doing push-ups and sit-ups, using elastic exercise bands, and using weight machines.   Weight-bearing activities. These help your whole body. They also help you maintain bone mass. Activities include walking, dancing, and housework.   Non-weight-bearing exercises. These help prevent back strain and pain. They do this by building the trunk and leg muscles. Exercises that help with flexibility can prevent falls. Examples include swimming, water exercise, and stretching.   Staying safe  Here are tips to stay safe:   Always check with your healthcare provider before starting any new exercise program.  Use weights only as instructed.  Stop any  exercise that causes pain.  [employer] last reviewed this educational content on 5/1/2018 2000-2021 [employer]. All rights reserved. This information is not intended as a substitute for professional medical care. Always follow your healthcare professional's instructions.          Preventing Osteoporosis: Avoiding Bone Loss  Certain factors can speed up bone loss or decrease bone growth. For example, alcohol, cigarettes, and certain medicines reduce bone mass. Some foods make it hard for your body to absorb calcium.  Things to avoid  Here are things to avoid to help prevent osteoporosis:  Alcohol. This is toxic to bones. It is a major cause of bone loss. Heavy drinking can cause osteoporosis even if you have no other risk factors.  Smoking. This reduces bone mass. Smoking may also interfere with estrogen levels and cause early menopause.  Inactivity. Not being active makes your bones lose strength and become thinner. Over time, thin bones may break. Women who aren't active are at a high risk for osteoporosis.  Certain medicines. Some medicines, such as cortisone, increase bone loss. They also decrease bone growth. Ask your healthcare provider about any side effects of your medicines, and how to prevent them.  Protein-rich or salty foods. Eaten in large amounts, these foods may deplete calcium.  Caffeine. This increases calcium loss. People who drink a lot of coffee, tea, or soda lose more calcium than those who don't.  [employer] last reviewed this educational content on 5/1/2018 2000-2021 [employer]. All rights reserved. This information is not intended as a substitute for professional medical care. Always follow your healthcare professional's instructions.          Preventing Osteoporosis: Meeting Your Calcium Needs  Your body needs calcium to build and repair bones. But it can't make calcium on its own. That's why it's important to eat calcium-rich foods. Some foods are naturally  rich in calcium. Others have calcium added (fortified). It's best to get calcium from the foods you eat. But if you can't get enough, you may want to take calcium supplements. To meet your daily calcium needs, try the foods listed below.               Dairy Fish & beans Other sources   Source   Calcium (mg) per serving   Source   Calcium (mg) per serving   Source   Calcium (mg) per serving   Low-fat yogurt, plain   415 mg/8 oz.   Sardines, Atlantic, canned, with bones   351 mg/3 oz.   Oatmeal, instant, fortified   215 mg/1 cup   Nonfat milk   302 mg/1 cup   Americus, sockeye, canned, with bones   239 mg/3 oz.   Tofu made with calcium sulfate   204 mg/3 oz.   Low-fat milk   297 mg/1 cup   Soybeans, fresh, boiled   131 mg/1/2 cup   Collards   179 mg/1/2 cup   Swiss cheese   272 mg/1 oz.   White beans, cooked   81 mg/1/2 cup   English muffin, whole wheat   175 mg/1 muffin   Cheddar cheese   205 mg/1 oz.   Navy beans, cooked   79 mg/1/2 cup   Kale   90 mg/1/2 cup   Ice cream strawberry   79 mg/1/2 cup           Orange, navel   56 mg/1 medium   Note: Calcium levels may vary depending on brand and size.  Daily calcium needs  14 to 18 years old: 1,300 mg  19 to 30 years old: 1,000 mg  31 to 50 years old: 1,000 mg  51 to 70 years old, women: 1,200 mg  51 to 70 years old, men: 1,000 mg  Pregnant or nursin to 18 years old: 1,300 mg, 19 to 50 years old: 1,000 mg  Older than 70 (women and men): 1,200 mg   [employer] last reviewed this educational content on 2018-2021 [employer]. All rights reserved. This information is not intended as a substitute for professional medical care. Always follow your healthcare professional's instructions.

## 2023-02-28 ENCOUNTER — DOCUMENTATION ONLY (OUTPATIENT)
Dept: OTHER | Facility: CLINIC | Age: 83
End: 2023-02-28
Payer: COMMERCIAL

## 2023-02-28 ENCOUNTER — DISCHARGE SUMMARY NURSING HOME (OUTPATIENT)
Dept: GERIATRICS | Facility: CLINIC | Age: 83
End: 2023-02-28
Payer: COMMERCIAL

## 2023-02-28 VITALS
RESPIRATION RATE: 18 BRPM | TEMPERATURE: 97.9 F | HEART RATE: 87 BPM | BODY MASS INDEX: 33.89 KG/M2 | WEIGHT: 203.4 LBS | SYSTOLIC BLOOD PRESSURE: 129 MMHG | HEIGHT: 65 IN | OXYGEN SATURATION: 94 % | DIASTOLIC BLOOD PRESSURE: 83 MMHG

## 2023-02-28 DIAGNOSIS — E66.09 CLASS 1 OBESITY DUE TO EXCESS CALORIES WITH BODY MASS INDEX (BMI) OF 33.0 TO 33.9 IN ADULT, UNSPECIFIED WHETHER SERIOUS COMORBIDITY PRESENT: ICD-10-CM

## 2023-02-28 DIAGNOSIS — E78.5 DYSLIPIDEMIA: ICD-10-CM

## 2023-02-28 DIAGNOSIS — E66.811 CLASS 1 OBESITY DUE TO EXCESS CALORIES WITH BODY MASS INDEX (BMI) OF 33.0 TO 33.9 IN ADULT, UNSPECIFIED WHETHER SERIOUS COMORBIDITY PRESENT: ICD-10-CM

## 2023-02-28 DIAGNOSIS — M05.9 RHEUMATOID ARTHRITIS WITH POSITIVE RHEUMATOID FACTOR, INVOLVING UNSPECIFIED SITE (H): ICD-10-CM

## 2023-02-28 DIAGNOSIS — E04.1 THYROID NODULE: ICD-10-CM

## 2023-02-28 DIAGNOSIS — K21.9 GASTROESOPHAGEAL REFLUX DISEASE, UNSPECIFIED WHETHER ESOPHAGITIS PRESENT: ICD-10-CM

## 2023-02-28 DIAGNOSIS — R53.81 PHYSICAL DECONDITIONING: ICD-10-CM

## 2023-02-28 DIAGNOSIS — M48.061 SPINAL STENOSIS OF LUMBAR REGION, UNSPECIFIED WHETHER NEUROGENIC CLAUDICATION PRESENT: ICD-10-CM

## 2023-02-28 DIAGNOSIS — D64.9 CHRONIC ANEMIA: ICD-10-CM
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
## 2023-02-28 DIAGNOSIS — J30.9 ALLERGIC RHINITIS, UNSPECIFIED SEASONALITY, UNSPECIFIED TRIGGER: ICD-10-CM

## 2023-02-28 DIAGNOSIS — S52.502D CLOSED FRACTURE OF DISTAL END OF LEFT RADIUS WITH ROUTINE HEALING, UNSPECIFIED FRACTURE MORPHOLOGY, SUBSEQUENT ENCOUNTER: ICD-10-CM

## 2023-02-28 DIAGNOSIS — F32.A DEPRESSION, UNSPECIFIED DEPRESSION TYPE: ICD-10-CM

## 2023-02-28 DIAGNOSIS — M80.00XD OSTEOPOROSIS WITH CURRENT PATHOLOGICAL FRACTURE WITH ROUTINE HEALING, UNSPECIFIED OSTEOPOROSIS TYPE, SUBSEQUENT ENCOUNTER: ICD-10-CM

## 2023-02-28 DIAGNOSIS — K59.01 SLOW TRANSIT CONSTIPATION: ICD-10-CM

## 2023-02-28 DIAGNOSIS — F41.9 ANXIETY: ICD-10-CM

## 2023-02-28 DIAGNOSIS — G47.33 OSA (OBSTRUCTIVE SLEEP APNEA): ICD-10-CM

## 2023-02-28 DIAGNOSIS — S52.021D CLOSED FRACTURE OF OLECRANON PROCESS OF RIGHT ULNA WITH ROUTINE HEALING, SUBSEQUENT ENCOUNTER: Primary | ICD-10-CM

## 2023-02-28 DIAGNOSIS — G89.29 CHRONIC LOW BACK PAIN, UNSPECIFIED BACK PAIN LATERALITY, UNSPECIFIED WHETHER SCIATICA PRESENT: ICD-10-CM

## 2023-02-28 DIAGNOSIS — M54.50 CHRONIC LOW BACK PAIN, UNSPECIFIED BACK PAIN LATERALITY, UNSPECIFIED WHETHER SCIATICA PRESENT: ICD-10-CM

## 2023-02-28 DIAGNOSIS — S22.31XD CLOSED FRACTURE OF ONE RIB OF RIGHT SIDE WITH ROUTINE HEALING, SUBSEQUENT ENCOUNTER: ICD-10-CM

## 2023-02-28 DIAGNOSIS — D62 ACUTE BLOOD LOSS ANEMIA: ICD-10-CM

## 2023-02-28 LAB — SARS-COV-2 RNA RESP QL NAA+PROBE: NEGATIVE

## 2023-02-28 PROCEDURE — 99316 NF DSCHRG MGMT 30 MIN+: CPT | Performed by: NURSE PRACTITIONER

## 2023-02-28 RX ORDER — ACETAMINOPHEN 325 MG/1
650 TABLET ORAL EVERY 6 HOURS PRN
Start: 2023-02-28

## 2023-02-28 NOTE — PATIENT INSTRUCTIONS
[facility] Discharge Orders    Name: Ginger Marshall  : 1940  Planned Discharge Date: TBD  Discharged to: home to daughter's house    MEDICAL FOLLOW UP:    Follow up with primary care provider in 1-2 weeks  Follow up with specialist   -Ortho as scheduled (3 weeks)  Current [facility] scheduled appointments:  Appointments in Next Year      Mar 06, 2023 11:30 AM  (Arrive by 11:15 AM)  ED/Hospital Follow Up with Duy Leyva MD  [facility] ([facility] ) [phone]     Mar 15, 2023 10:00 AM  (Arrive by 9:45 AM)  Adult Psychotherapy with HECTOR Springer  [facility] ([facility] ) [phone]     Mar 20, 2023 11:00 AM  (Arrive by 10:45 AM)  US THYROID with RSCCUS2  [facility] ([facility] ) [phone]     Mar 21, 2023 11:00 AM  Infusion Visit with RH FAST TRACK PLUS 2  [facility] [place] ([facility] ) [phone]     2023  2:00 PM  (Arrive by 1:45 PM)  Adult Psychotherapy with HECTOR Springer  [facility] ([facility] ) [phone]     2023 10:00 AM  MTM New with Margaux Harley RPH  [facility] ([facility] ) [phone]     May 09, 2023  2:00 PM  (Arrive by 1:45 PM)  Return Visit with Nico Byers MD  [facility] ([facility] ) [phone]     2024 11:00 AM  (Arrive by 10:45 AM)  DX HIP/PELVIS/SPINE with FKDX1  [facility] ([facility] ) [phone]       FUTURE LABS: CBC at follow up with PCP      DISCHARGE MEDICATIONS:  The patient s pharmacy is authorized to  dispense a 30-day supply of medications. Refill requests should be directed to the primary provider, Duy Leyva  Current Outpatient Medications   Medication Sig Dispense Refill    acetaminophen (TYLENOL) 325 MG tablet Take 2 tablets (650 mg) by mouth every 6 hours as needed for mild pain Max tylenol 3000 mg in 24 hours      albuterol (PROAIR HFA/PROVENTIL HFA/VENTOLIN HFA) 108 (90 Base) MCG/ACT inhaler Inhale 2 puffs into the lungs every 6 hours as needed for shortness of breath, wheezing or cough      atorvastatin (LIPITOR) 40 MG tablet Take 1 tablet (40 mg) by mouth daily 90 tablet 3    busPIRone (BUSPAR) 10 MG tablet Take 1 tablet (10 mg) by mouth 2 times daily 180 tablet 1    CALCIUM CITRATE PO Take 300 mg by mouth daily Take with meal that contains least amount of calcium      carboxymethylcellulose PF (REFRESH PLUS) 0.5 % ophthalmic solution Place 1 drop into both eyes 2 times daily as needed for dry eyes      Cholecalciferol (VITAMIN D-3 PO) Take 1,000 Units by mouth daily      desvenlafaxine (PRISTIQ) 100 MG 24 hr tablet Take 1 tablet (100 mg) by mouth daily 90 tablet 1    gabapentin (NEURONTIN) 300 MG capsule Take 2 capsules (600 mg) by mouth 2 times daily      guaiFENesin (MUCINEX) 600 MG 12 hr tablet Take 1 tablet (600 mg) by mouth 2 times daily 20 tablet 0    ipratropium (ATROVENT) 0.06 % nasal spray 2 sprays in each nostril, 2-3 times per day as needed for rhinitis 15 mL 1    leucovorin (WELLCOVORIN) 5 MG tablet Take 1 tablet (5 mg) by mouth every 7 days . Take 24 hours after taking Methotrexate each week. 13 tablet 2    Lidocaine (LIDOCARE) 4 % Patch Place 1 patch onto the skin every 24 hours To prevent lidocaine toxicity, patient should be patch free for 12 hrs daily.      loratadine (CLARITIN) 10 MG tablet Take 1 tablet (10 mg) by mouth 2 times daily      Methotrexate, PF, (RASUVO) 25 MG/0.5ML autoinjector Inject 0.5 mLs (25 mg) Subcutaneous every 7 days . Hold for signs of infection, and seek  medical attention. Take every Thursday.      naproxen sodium (ANAPROX) 220 MG tablet Take 2 tablets (440 mg) by mouth daily as needed for moderate pain (4-6) (less than monthly use currently)      pantoprazole (PROTONIX) 40 MG EC tablet TAKE ONE TABLET BY MOUTH ONCE DAILY 30-60 MINUTES BEFORE A MEAL 90 tablet 1    polyethylene glycol (MIRALAX) 17 GM/Dose powder Take 17 g by mouth daily 510 g     senna-docusate (SENOKOT-S/PERICOLACE) 8.6-50 MG tablet Take 1 tablet by mouth 2 times daily      sucralfate (CARAFATE) 1 GM tablet Take 1 tablet (1 g) by mouth 4 times daily as needed for nausea (reflux) TAKE ONE TABLET BY MOUTH FOUR TIMES A DAY AS NEEDED HEARTBURN      triamcinolone (KENALOG) 0.1 % external ointment Apply 1 applicator topically daily Apply to back      Upadacitinib ER (RINVOQ) 15 MG TB24 Take 15 mg by mouth daily 30 tablet 6    vitamin C (ASCORBIC ACID) 1000 MG TABS Take 1 tablet (1,000 mg) by mouth daily      zinc gluconate 50 MG tablet Take 1 tablet (50 mg) by mouth daily      zoledronic Acid (RECLAST) 5 MG/100ML SOLN infusion Inject 5 mg into the vein once Every year (next dose March 2023) (Patient not taking: Reported on 2/20/2023)         SERVICES:  Home Care:  Occupational Therapy, Physical Therapy, Registered Nurse and Home Health Aide    ADDITIONAL INSTRUCTIONS:  Patient is okay to weight bear with her Right arm in her long arm splint. Patient should not weight bear any weight with her left arm. Patient should be in her splints full time. Patient should have her splints on anytime she is out of her room, moving around, and sleeping. She may remove them while resting if her arms are supported by a pillow. Splints may be removed for hygiene purposes.  Continue to follow your diet:  regular.   CPAP when sleeping; settings per usual practice.    AKIRA Breen CNP  This document was electronically signed on February 28, 2023

## 2023-02-28 NOTE — PROGRESS NOTES
[facility] GERIATRICS DISCHARGE SUMMARY  PATIENT'S NAME: Ginger Marshall  YOB: 1940  MEDICAL RECORD NUMBER:  [id number]  Place of Service where encounter took place:  [facility]  ([facility]) [[id number]]    PRIMARY CARE PROVIDER AND CLINIC RESPONSIBLE AFTER TRANSFER:   Duy Leyva MD, [address] / [place] [address]    [facility] Provider     Transferring providers: AKIRA Breen CNP, Ralph Dai MD  Recent Hospitalization/ED:  [facility] Hospital stay 2/11/23 to 2/15/23.  Date of SNF Admission: February 15, 2023  Date of SNF (anticipated) Discharge: TBD  Discharged to: initially going to stay at her daughter's, then will return home  Cognitive Scores: SLUMS 29/30  Physical Function: Ambulating 300 feet with platform walker, completed 3 steps with handrail, transfers min assist, max assist for grooming/hygiene and upper body dressing and lower body dressing, min assist for toileting  DME: wheelchair provided by therapy    CODE STATUS/ADVANCE DIRECTIVES DISCUSSION: No CPR-DNI  ALLERGIES: Abatacept; Celebrex [celecoxib]; Celecoxib; Levaquin [levofloxacin]; Orencia [abatacept]; Septra [bactrim]; Sulfa drugs; Sulfamethoxazole-trimethoprim; Tramadol; Valdecoxib; Adhesive tape; and Antihistamines, chlorpheniramine-type  [alkylamines]    NURSING FACILITY COURSE     Past medical history significant for dyslipidemia, duodenal ulcer, GERD, rheumatoid arthritis, chronic anemia, anxiety, depression, migraine, chronic low back pain, osteoporosis obesity, SHERRY.     Summary of recent hospitalization:  Patient was hospitalized at [facility] from February 11 to February 15 for right olecranial and and left distal radius fracture secondary to fall.  Patient presented to the emergency department for evaluation of left wrist pain, right elbow pain, and facial pain due to a fall that occurred when she was walking her dog.  CT head, CT facial bones, CT cervical spine  studies were negative for acute traumatic injuries.  X-ray of the left wrist demonstrated complex comminuted and impacted intra-articular distal radius fracture with displacement and dorsal angulation and x-ray of right elbow was remarkable for complex comminuted fracture of the olecranial and proximal ulna with intra articular involvement.  Laboratory evaluation was significant for hemoglobin of 10.5.  Orthopedics was consulted and patient is status post ORIF of left wrist fracture and right elbow on February 12.  Hemoglobin postoperatively dropped to 9.1. Discharged to TCU for physical rehabilitation and medical management.         Summary of nursing facility stay:   Patient was seen today for discharge visit in the TCU.  She reports pain is managed.  She has follow-up scheduled with Ortho in 3 weeks.  She reports bowels moving regularly.  She denies dysuria/trouble voiding, shortness of breath, chest pain, dizziness/light headedness.  Per discussion with  patient will likely be discharging home tomorrow or Thursday.    Complex right olecranial and and proximal ulna fracture status post ORIF on 2/12/2023  Complex left intra-articular distal radius fracture status post ORIF on 2/12/2023  Osteoporosis with current pathologic fracture  Mechanical fall, subsequent encounter  -Pain managed  -using dilaudid infrequently  Plan: Change tylenol to PRN. Discontinue dilaudid. Continue lidocaine patch, gabapentin 600 mg twice daily, naproxen 440 mg daily as needed.  Nonweightbearing to right upper extremity, okay for weightbearing with left forearm and elbow.  Therapy as below.  Follow-up with ortho in 3 weeks per recommendations.  Continue calcium citrate daily, vitamin D3 5000 units.  Receives Reclast yearly-next dose due 3/2023.     Recent history of right sixth or seventh rib fracture in January 2023  -No complaints of this today  Plan: Continue pain management as above. IS 10 reps 4 times daily with  assist.     Acute blood loss anemia  Chronic anemia  -Baseline hemoglobin 11-12  -Hemoglobin 10.0 on 2/22  Plan: CBC PRN     Dyslipidemia  Chronic  Plan: Continue atorvastatin 40 mg daily.     Rheumatoid arthritis with positive factor  Plan: Continue pain management as above.  Continue methotrexate 25 mg subcutaneous every month leucovorin 5 mg every Thursday, Rinvoq 15 mg daily.  Follow-up with primary rheumatologist recommendations.     GERD  -With history of duodenal ulcer  Plan: Continue Protonix 40 mg daily, sucralfate 1 g 4 times daily as needed.     Spinal stenosis of lumbar region and chronic low back pain  Plan: Continue pain management as above     Depression  Anxiety  -Mood controlled  Plan: Continue buspirone 10 mg twice daily, desvenlafaxine 100 mg daily.  Monitor mood and symptoms.  Follow up with PCP     Allergic rhinitis  Plan: Continue Claritin 10 mg twice daily, Atrovent 2 sprays to each nostril TID PRN, guafenesin  mg BID.     Slow transit constipation  -Bowels moving regularly  Plan: Continue miralax 17 gram daily. Senna S1 tab twice daily. Monitor bowels.      Obesity, BMI 33.85  SHERRY  Plan: Continue to encourage weight loss.  Continue CPAP per home settings.     Physical deconditioning  Comment: Acute, secondary to recent hospitalization, medical conditions as above  -completed course of therapy  Plan: Continue therapy through home care     Incidental findings:  Left thyroid nodule measuring 20 mm noted on CT cervical spine on 2/11/2023  Plan: follow-up outpatient       Discharge Medications:  MED REC REQUIRED  Post Medication Reconciliation Status:  Medication reconciliation previously completed during another office visit      Current Outpatient Medications   Medication Sig Dispense Refill     acetaminophen (TYLENOL) 325 MG tablet Take 2 tablets (650 mg) by mouth every 6 hours as needed for mild pain Max tylenol 3000 mg in 24 hours       albuterol (PROAIR HFA/PROVENTIL HFA/VENTOLIN HFA)  108 (90 Base) MCG/ACT inhaler Inhale 2 puffs into the lungs every 6 hours as needed for shortness of breath, wheezing or cough       atorvastatin (LIPITOR) 40 MG tablet Take 1 tablet (40 mg) by mouth daily 90 tablet 3     busPIRone (BUSPAR) 10 MG tablet Take 1 tablet (10 mg) by mouth 2 times daily 180 tablet 1     CALCIUM CITRATE PO Take 300 mg by mouth daily Take with meal that contains least amount of calcium       carboxymethylcellulose PF (REFRESH PLUS) 0.5 % ophthalmic solution Place 1 drop into both eyes 2 times daily as needed for dry eyes       Cholecalciferol (VITAMIN D-3 PO) Take 1,000 Units by mouth daily       desvenlafaxine (PRISTIQ) 100 MG 24 hr tablet Take 1 tablet (100 mg) by mouth daily 90 tablet 1     gabapentin (NEURONTIN) 300 MG capsule Take 2 capsules (600 mg) by mouth 2 times daily       guaiFENesin (MUCINEX) 600 MG 12 hr tablet Take 1 tablet (600 mg) by mouth 2 times daily 20 tablet 0     ipratropium (ATROVENT) 0.06 % nasal spray 2 sprays in each nostril, 2-3 times per day as needed for rhinitis 15 mL 1     leucovorin (WELLCOVORIN) 5 MG tablet Take 1 tablet (5 mg) by mouth every 7 days . Take 24 hours after taking Methotrexate each week. 13 tablet 2     Lidocaine (LIDOCARE) 4 % Patch Place 1 patch onto the skin every 24 hours To prevent lidocaine toxicity, patient should be patch free for 12 hrs daily.       loratadine (CLARITIN) 10 MG tablet Take 1 tablet (10 mg) by mouth 2 times daily       Methotrexate, PF, (RASUVO) 25 MG/0.5ML autoinjector Inject 0.5 mLs (25 mg) Subcutaneous every 7 days . Hold for signs of infection, and seek medical attention. Take every Thursday.       naproxen sodium (ANAPROX) 220 MG tablet Take 2 tablets (440 mg) by mouth daily as needed for moderate pain (4-6) (less than monthly use currently)       pantoprazole (PROTONIX) 40 MG EC tablet TAKE ONE TABLET BY MOUTH ONCE DAILY 30-60 MINUTES BEFORE A MEAL 90 tablet 1     polyethylene glycol (MIRALAX) 17 GM/Dose powder  "Take 17 g by mouth daily 510 g      senna-docusate (SENOKOT-S/PERICOLACE) 8.6-50 MG tablet Take 1 tablet by mouth 2 times daily       sucralfate (CARAFATE) 1 GM tablet Take 1 tablet (1 g) by mouth 4 times daily as needed for nausea (reflux) TAKE ONE TABLET BY MOUTH FOUR TIMES A DAY AS NEEDED HEARTBURN       triamcinolone (KENALOG) 0.1 % external ointment Apply 1 applicator topically daily Apply to back       Upadacitinib ER (RINVOQ) 15 MG TB24 Take 15 mg by mouth daily 30 tablet 6     vitamin C (ASCORBIC ACID) 1000 MG TABS Take 1 tablet (1,000 mg) by mouth daily       zinc gluconate 50 MG tablet Take 1 tablet (50 mg) by mouth daily       zoledronic Acid (RECLAST) 5 MG/100ML SOLN infusion Inject 5 mg into the vein once Every year (next dose March 2023) (Patient not taking: Reported on 2/20/2023)           Past Medical History:   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     Menopause 1989    late 40's     Migraine 04/27/2014    resolved     Multinodular goiter      Other chronic pain     joints     Pelvic fracture (H) 05/13/2014     Rheumatic mitral stenosis      Rheumatoid arteritis (H)      S/P lumbar fusion 04/03/2017     Sleep apnea      Vitamin B12 deficiency      Physical Exam:   Vitals: /83   Pulse 87   Temp 97.9  F (36.6  C)   Resp 18   Ht 1.651 m (5' 5\")   Wt 92.3 kg (203 lb 6.4 oz)   SpO2 94%   BMI 33.85 kg/m    BMI: Body mass index is 33.85 kg/m .  GENERAL APPEARANCE:  Alert, in " NAD  HEENT: normocephalic, moist mucous membranes, nose without drainage or crusting  RESP:  respiratory effort normal, no respiratory distress, Lung sounds clear, patient is on RA  CV: auscultation of heart done, rate and rhythm regular  ABDOMEN: + bowel sounds, soft, nontender, no grimacing or guarding with palpation.  M/S: with braces to bilateral upper extremities, no lower extremity edema  NEURO: cranial nerves 2-12 grossly intact and at patient's baseline; moves extremities freely  PSYCH: oriented x 3,  affect and mood normal      SNF labs: Labs done in SNF are in [facility] EPIC. Please refer to them using EPIC/Care Everywhere. and Recent labs in EPIC reviewed by me today.     DISCHARGE PLAN:    Follow up labs: CBC at follow up with PCP    Medical Follow Up:     Follow up with primary care provider in 1-2 weeks  Follow up with specialist   -Ortho as scheduled (3 weeks)    Current [facility] scheduled appointments:  Appointments in Next Year        Mar 06, 2023 11:30 AM  (Arrive by 11:15 AM)  ED/Hospital Follow Up with Duy Leyva MD  [facility] ([facility] ) [phone]   Mar 15, 2023 10:00 AM  (Arrive by 9:45 AM)  Adult Psychotherapy with HECTOR Springer  [facility] ([facility] ) [phone]   Mar 20, 2023 11:00 AM  (Arrive by 10:45 AM)  US THYROID with RSCCUS2  [facility] ([facility] ) [phone]   Mar 21, 2023 11:00 AM  Infusion Visit with RH FAST TRACK PLUS 2  [facility] ([facility] ) [phone]   Apr 04, 2023  2:00 PM  (Arrive by 1:45 PM)  Adult Psychotherapy with HECTOR Springer  [facility] ([facility] ) [phone]   Apr 24,  2023 10:00 AM  MTM New with Margaux Harley RPH  [facility] ([facility] ) [phone]   May 09, 2023  2:00 PM  (Arrive by 1:45 PM)  Return Visit with Nico Byers MD  [facility] ([facility] ) [phone]   Feb 06, 2024 11:00 AM  (Arrive by 10:45 AM)  DX HIP/PELVIS/SPINE with FKDX1  [facility] ([facility] ) [phone]            Discharge Services: Home Care:  Occupational Therapy, Physical Therapy, Registered Nurse and Home Health Aide    Discharge Instructions:     Patient is okay to weight bear with her Right arm in her long arm splint. Patient should not weight bear any weight with her left arm. Patient should be in her splints full time. Patient should have her splints on anytime she is out of her room, moving around, and sleeping. She may remove them while resting if her arms are supported by a pillow. Splints may be removed for hygiene purposes.    Continue to follow your diet:  regular.     CPAP when sleeping; settings per usual practice.     TOTAL DISCHARGE TIME:   Greater than 30 minutes  Electronically signed by:  AKIRA Breen CNP     Home care Face to Face documentation done in [facility] attached to Home care orders for [facility].       .

## 2023-03-03 ENCOUNTER — PATIENT OUTREACH (OUTPATIENT)
Dept: CARE COORDINATION | Facility: CLINIC | Age: 83
End: 2023-03-03
Payer: COMMERCIAL

## 2023-03-03 DIAGNOSIS — F41.9 ANXIETY: ICD-10-CM

## 2023-03-03 RX ORDER — BUSPIRONE HYDROCHLORIDE 10 MG/1
10 TABLET ORAL 2 TIMES DAILY
Qty: 180 TABLET | Refills: 1 | OUTPATIENT
Start: 2023-03-03

## 2023-03-03 SDOH — ECONOMIC STABILITY: FOOD INSECURITY: WITHIN THE PAST 12 MONTHS, YOU WORRIED THAT YOUR FOOD WOULD RUN OUT BEFORE YOU GOT MONEY TO BUY MORE.: NEVER TRUE

## 2023-03-03 SDOH — ECONOMIC STABILITY: TRANSPORTATION INSECURITY
IN THE PAST 12 MONTHS, HAS THE LACK OF TRANSPORTATION KEPT YOU FROM MEDICAL APPOINTMENTS OR FROM GETTING MEDICATIONS?: NO

## 2023-03-03 SDOH — ECONOMIC STABILITY: FOOD INSECURITY: WITHIN THE PAST 12 MONTHS, THE FOOD YOU BOUGHT JUST DIDN'T LAST AND YOU DIDN'T HAVE MONEY TO GET MORE.: NEVER TRUE

## 2023-03-03 SDOH — ECONOMIC STABILITY: TRANSPORTATION INSECURITY
IN THE PAST 12 MONTHS, HAS LACK OF TRANSPORTATION KEPT YOU FROM MEETINGS, WORK, OR FROM GETTING THINGS NEEDED FOR DAILY LIVING?: NO

## 2023-03-03 ASSESSMENT — SOCIAL DETERMINANTS OF HEALTH (SDOH): HOW HARD IS IT FOR YOU TO PAY FOR THE VERY BASICS LIKE FOOD, HOUSING, MEDICAL CARE, AND HEATING?: NOT HARD AT ALL

## 2023-03-03 NOTE — PROGRESS NOTES
Clinic Care Coordination Contact  OUTREACH    Referral Information:  Referral Source: IP Handoff  Primary Diagnosis: Injury/Fall    Chief Complaint   Patient presents with     Clinic Care Coordination - Initial     TCU Discharge     Clinic Care Coordination - Homecare/TCU     [place] Utilization: 74% Risk of Admission or ED Visit   Clinic Utilization  Difficulty keeping appointments:: No  Compliance Concerns: No  No-Show Concerns: No  No PCP office visit in Past Year: No  Utilization    Hospital Admissions  1             ED Visits  2             No Show Count (past year)  1                Current as of: 3/2/2023  9:39 PM            Clinical Concerns:  Current Medical Concerns:    Patient Active Problem List   Diagnosis     Rheumatoid arthritis involving right hand with positive rheumatoid factor (H)     History of colonic polyps     Multinodular goiter     CARDIOVASCULAR SCREENING; LDL GOAL LESS THAN 130     Pulmonary nodule     PSEUDOPHAKIA OU     PVD (POSTERIOR VITREOUS DETACHMENT) OU     PXF (PSEUDOEXFOLIATION OF LENS CAPSULE) OD     GERD (gastroesophageal reflux disease)     Hyperlipidemia LDL goal <100     Advance Care Planning     Neuropathy     SHERRY (obstructive sleep apnea)-severe (AHI 35)     zEncounter for counseling     Anemia of chronic disease     Osteoporosis     Cornea guttata, ou     Conjunctival concretions     Stenosis, spinal, lumbar     Chronic pain     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     MGD (meibomian gland dysfunction)     Blepharitis of both eyes     Personal history of healed osteoporosis fracture     Iron malabsorption     Hyperglycemia     Chronic bilateral low back pain without sciatica     Allergic state, subsequent encounter     Anxiety     History of depression     DDD (degenerative disc disease), lumbar     Chronic right shoulder pain     Moderate episode of recurrent major depressive disorder (H)     Rheumatic mitral stenosis     Osteoarthritis of first  metatarsophalangeal (MTP) joint of right foot     History of bisphosphonate therapy     Morbid obesity (H)     Immunocompromised (H)     Thoracic spine pain     Fall, initial encounter     Other closed intra-articular fracture of distal end of left radius, initial encounter     Closed fracture of shaft of left ulna, unspecified fracture morphology, initial encounter     Closed fracture of olecranon process of right ulna, initial encounter     Idiopathic osteoporosis        Current Behavioral Concerns: none noted    Education Provided to patient: Care Coordination role, clinic after hours, medications, appointments discussed/reviewed.    Pain  Pain (GOAL):: No  Health Maintenance Reviewed: Due/Overdue   Health Maintenance Due   Topic Date Due     URINE DRUG SCREEN  Never done     EYE EXAM  08/18/2021      Clinical Pathway: None    Medication Management:  Medication review status: Medications reviewed and no changes reported per patient.           Functional Status:  Dependent ADLs:: Independent  Dependent IADLs:: Transportation, Cooking, Cleaning, Laundry, Shopping  Bed or wheelchair confined:: No  Mobility Status: Independent w/Device  Fallen 2 or more times in the past year?: No  Any fall with injury in the past year?: Yes    Living Situation:  Current living arrangement:: I live in a private home with family (staying with daughter)  [facility]    Lifestyle & Psychosocial Needs:    Social Determinants of Health     Tobacco Use: Medium Risk     Smoking Tobacco Use: Former     Smokeless Tobacco Use: Never     Passive Exposure: Not on file   Alcohol Use: Not on file   Financial Resource Strain: Low Risk      Difficulty of Paying Living Expenses: Not hard at all   Food Insecurity: No Food Insecurity     Worried About Running Out of Food in the Last Year: Never true     Ran Out of Food in the Last Year: Never true   Transportation Needs: No Transportation Needs     Lack of Transportation  (Medical): No     Lack of Transportation (Non-Medical): No   Physical Activity: Not on file   Stress: Not on file   Social Connections: Not on file   Intimate Partner Violence: Not on file   Depression: Not at risk     PHQ-2 Score: 2   Housing Stability: Not on file     Diet:: Regular  Inadequate nutrition (GOAL):: No  Tube Feeding: No  Inadequate activity/exercise (GOAL):: No  Significant changes in sleep pattern (GOAL): No  Transportation means:: Accessible car, Family  Pentecostal or spiritual beliefs that impact treatment:: No  Mental health DX:: Yes  Mental health DX how managed:: Medication, Outpatient Counseling  Mental health management concern (GOAL):: No  Chemical Dependency Status: No Current Concerns  Chemical Dependency Management:  (NA)  Informal Support system:: Children      Resources and Interventions:  Current Resources:   Skilled Home Care Services: Skilled Nursing, Home Health Aid, Physical Therapy, Occupational Therapy  Community Resources: DME, Home Care, Meals on Wheels, OP Mental Health  Supplies Currently Used at Home: None  Equipment Currently Used at Home: walker, rolling, grab bar, tub/shower, shower chair  Employment Status: retired  Advance Care Plan/Directive  Advanced Care Plans/Directives on file:: Yes  Type Advanced Care Plans/Directives: Advanced Directive - On File  Advanced Care Plan/Directive Status: Not Applicable    Referrals Placed: None    Patient/Caregiver understanding: Patient/caregiver verbalized understanding and denies any additional questions or concerns at this time. RNCC engaged in AIDET communications during encounter.       Future Appointments              In 3 days Duy Leyva MD [facility], RAMANA CL    In 1 week Rocío Arenas LICSW [facility] JACK    In 2 weeks RSCCUS2 [facility], RSCC    In 2 weeks  FAST TRACK PLUS 2 M  [facility] RID    In 1 month Rocío Arenas LICSW [facility] JACK    In 1 month Margaux Harley RPH [facility] RAMANA Rob CL    In 2 months Nico Byers MD [facility] FELIX [facility] CLIN    In 11 months FKDX1 [facility] CLIN        Plan: At this time, patient/caregiver denies outstanding need for connection or referral to resources or assistance navigating recommended follow up care. No further Care Coordination outreaches scheduled at this time, patient/caregiver was provided with this writer's number and encouraged to call with future needs or questions.     Lucille Awad RN Care Coordinator  [facility] - [place], [place], [place]  Email: [email or web address]  Phone: [phone]

## 2023-03-03 NOTE — TELEPHONE ENCOUNTER
Brief chart review as PCP is out. It looks like this was sent in on 2/15/23 for 90 days with 1 refill. She should not need refill.    Patti Deutsch PA-C

## 2023-03-03 NOTE — TELEPHONE ENCOUNTER
Routing refill request to provider for review/approval because:  Medication is reported/historical/transtional    Vika Shabazz RN      Date/Time Action Taken User Additional Information   02/15/23 1145 Sign Carolyn Harrington PA-C Modify from Order:[id number]   02/15/23 1145 Taking Flag Checked Carolyn Harrington PA-C [id number]   02/20/23 1434 Taking Flag Checked Siria Cerna APRN CNP    02/21/23 1118 Taking Flag Checked Isaac Ibrahim MA    02/28/23 1042 Taking Flag Checked Siria Cerna APRN [facility]    03/03/23 0933 Reorder Interface, Eprescribing To Order:[id number]     Outpatient Medication Detail     Disp Refills Start End SHADIA   busPIRone (BUSPAR) 10 MG tablet 180 tablet 1 2/15/2023  No   Sig - Route: Take 1 tablet (10 mg) by mouth 2 times daily - Oral   Class: Transitional   Order: [id number]

## 2023-03-04 NOTE — PATIENT INSTRUCTIONS
Applied Schedule lumbar MRI  We will call you with results  Please contact the clinic if pain persists at [phone].

## 2023-03-06 ENCOUNTER — OFFICE VISIT (OUTPATIENT)
Dept: FAMILY MEDICINE | Facility: CLINIC | Age: 83
End: 2023-03-06
Payer: COMMERCIAL

## 2023-03-06 VITALS
BODY MASS INDEX: 33.99 KG/M2 | DIASTOLIC BLOOD PRESSURE: 72 MMHG | HEART RATE: 86 BPM | OXYGEN SATURATION: 98 % | HEIGHT: 65 IN | WEIGHT: 204 LBS | TEMPERATURE: 97.9 F | SYSTOLIC BLOOD PRESSURE: 128 MMHG

## 2023-03-06 DIAGNOSIS — K21.9 GASTROESOPHAGEAL REFLUX DISEASE, UNSPECIFIED WHETHER ESOPHAGITIS PRESENT: Chronic | ICD-10-CM

## 2023-03-06 DIAGNOSIS — S52.572A OTHER CLOSED INTRA-ARTICULAR FRACTURE OF DISTAL END OF LEFT RADIUS, INITIAL ENCOUNTER: ICD-10-CM

## 2023-03-06 DIAGNOSIS — D50.8 OTHER IRON DEFICIENCY ANEMIA: ICD-10-CM

## 2023-03-06 DIAGNOSIS — E67.3 HIGH VITAMIN D LEVEL: ICD-10-CM

## 2023-03-06 DIAGNOSIS — D84.9 IMMUNOCOMPROMISED (H): ICD-10-CM

## 2023-03-06 DIAGNOSIS — R05.1 ACUTE COUGH: ICD-10-CM

## 2023-03-06 DIAGNOSIS — S52.021D CLOSED FRACTURE OF OLECRANON PROCESS OF RIGHT ULNA WITH ROUTINE HEALING: ICD-10-CM

## 2023-03-06 DIAGNOSIS — M80.00XD AGE-RELATED OSTEOPOROSIS WITH CURRENT PATHOLOGICAL FRACTURE WITH ROUTINE HEALING, SUBSEQUENT ENCOUNTER: ICD-10-CM

## 2023-03-06 DIAGNOSIS — Z09 HOSPITAL DISCHARGE FOLLOW-UP: Primary | ICD-10-CM

## 2023-03-06 DIAGNOSIS — S52.202A CLOSED FRACTURE OF SHAFT OF LEFT ULNA, UNSPECIFIED FRACTURE MORPHOLOGY, INITIAL ENCOUNTER: ICD-10-CM

## 2023-03-06 PROCEDURE — U0005 INFEC AGEN DETEC AMPLI PROBE: HCPCS | Performed by: STUDENT IN AN ORGANIZED HEALTH CARE EDUCATION/TRAINING PROGRAM

## 2023-03-06 PROCEDURE — U0003 INFECTIOUS AGENT DETECTION BY NUCLEIC ACID (DNA OR RNA); SEVERE ACUTE RESPIRATORY SYNDROME CORONAVIRUS 2 (SARS-COV-2) (CORONAVIRUS DISEASE [COVID-19]), AMPLIFIED PROBE TECHNIQUE, MAKING USE OF HIGH THROUGHPUT TECHNOLOGIES AS DESCRIBED BY CMS-2020-01-R: HCPCS | Performed by: STUDENT IN AN ORGANIZED HEALTH CARE EDUCATION/TRAINING PROGRAM

## 2023-03-06 PROCEDURE — 99214 OFFICE O/P EST MOD 30 MIN: CPT | Performed by: STUDENT IN AN ORGANIZED HEALTH CARE EDUCATION/TRAINING PROGRAM

## 2023-03-06 ASSESSMENT — PAIN SCALES - GENERAL: PAINLEVEL: MILD PAIN (3)

## 2023-03-06 NOTE — PROGRESS NOTES
Assessment & Plan     Hospital discharge follow-up  Other closed intra-articular fracture of distal end of left radius, initial encounter  Closed fracture of shaft of left ulna, unspecified fracture morphology, initial encounter  Closed fracture of olecranon process of right ulna with routine healing  Age-related osteoporosis with current pathological fracture with routine healing, subsequent encounter  Overall symptoms are improving. Staying with daughter and family who is helping with ADLs. Pain regimen to include ice, gabapentin TID, tylenol up to 4g per day, Aleve BID. Discussed staying on 40mg pantoprazole until pain (and NSAIDs) are gone/discontinued (see below). Plan to only use gabapentin for short period of time as can increase confusion/sedation and fall risk. (Consider one additional 30 day supply). Does have follow up with Orthopaedics scheduled for 3/20/23. Follows with endocrinology for osteoporosis and has upcoming appt for IV zoledronic acid infusion.    Gastroesophageal reflux disease, unspecified whether esophagitis present  Currently asymptomatic on 40mg pantoprazole (for hx of duodenal ulcer) in setting of osteoporosis on IV PPI. Plan to continue dose of PPI while on NSAIDs as above. Once NSAIDs discontinued, plan to decrease to 20mg PPI and eventually transition to H2B if able.    High vitamin D level  Last D3 level above normal range. Is currently on 2000U, down from 5000U in past. Will repeat Vitamin D level.  - Vitamin D Deficiency    Other iron deficiency anemia  Hemoglobin slowly improving. Currently 10.0 from 9.1 in TCU. Will plan to repeat in 4 weeks.   - Hemoglobin    Acute cough  Immunocompromised (H)  Day 0 of symptoms. Is high risk if COVID and patient amenable to COVID treatment if positive. Will obtain testing today.   - Asymptomatic COVID-19 Virus (Coronavirus) by PCR Nasopharyngeal    BMI:   Estimated body mass index is 33.95 kg/m  as calculated from the following:    Height as  "of this encounter: 1.651 m (5' 5\").    Weight as of this encounter: 92.5 kg (204 lb).     Return in about 4 weeks (around 4/3/2023) for Follow up for lab testing.    Duy Leyva MD  [facility] [place]  3/6/2023    Alejandro Miles is a 82 year old presenting for the following health issues:    Hospital F/U    [facility]     Hospital Follow-up Visit:    Hospital/Nursing Home/IP Rehab Facility: [facility]  Date of Admission: 2/11/2023  Date of Discharge:2/28/2023  Reason(s) for Admission: Fracture     Was your hospitalization related to COVID-19? No   Problems taking medications regularly:  None  Medication changes since discharge: None  Problems adhering to non-medication therapy:  None    Summary of hospitalization:  [facility] discharge summary reviewed  Diagnostic Tests/Treatments reviewed.  Follow up needed: none  Other Healthcare Providers Involved in Patient s Care:         Specialist appointment - March 20th 2023  Update since discharge: improved.     Living with daughter and  who is helping with home cares    Pain regimen  Was on dilaudid in the TCU, didn't feel this was helpful.  Currently using gabapentin, tylenol ES 1000mg and Aleve - twice daily.  Pain is not constant but occasional.     Illness  Sniffles started today. And cough (dry) feeling in throat  Family member was sick recently. Home COVID test was negative  No fevers, chills/nightsweats, no abdominal pain, headaches.   No SOB, chest pain.     Vitamin D  Is on 2000U of vitamin D     Plan of care communicated with patient and family        Objective    /72   Pulse 86   Temp 97.9  F (36.6  C) (Oral)   Ht 1.651 m (5' 5\")   Wt 92.5 kg (204 lb)   SpO2 98%   BMI 33.95 kg/m    Body mass index is 33.95 kg/m .     Physical Exam   GENERAL: healthy, alert and no distress.   HEAD: Normocephalic, atraumatic.   EYES: PERRL. Normal conjunctivae, sclera.   ENT: Normal EAC and TMs " bilaterally.  Normal oropharynx.   NECK: Supple. No lymphadenopathy appreciated. Trachea midline. Thyroid not enlarged, not TTP.  RESP: lungs clear to auscultation - no rales, rhonchi or wheezes  CV: regular rate and rhythm, normal S1 S2, no murmur, click, rub or gallop. No carotid bruits. No peripheral swelling noted.   ABDOMEN: soft, no TTP x4 quadrants. No hepatomegaly or masses appreciated. BS normactive.  MSK: no gross musculoskeletal defects noted.  SKIN: no suspicious lesions or rashes.  EXT: Warm and well perfused. Fingers warm with with normal sensation bilaterally.  NEURO: CNII-XII grossly intact. No focal deficits.  PSYCH: Groomed, dressed appropriately for weather. Normal mood with consistent affect.

## 2023-03-07 LAB — SARS-COV-2 RNA RESP QL NAA+PROBE: NEGATIVE

## 2023-03-14 ENCOUNTER — MYC MEDICAL ADVICE (OUTPATIENT)
Dept: FAMILY MEDICINE | Facility: CLINIC | Age: 83
End: 2023-03-14
Payer: COMMERCIAL

## 2023-03-14 ENCOUNTER — NURSE TRIAGE (OUTPATIENT)
Dept: FAMILY MEDICINE | Facility: CLINIC | Age: 83
End: 2023-03-14

## 2023-03-14 ENCOUNTER — OFFICE VISIT (OUTPATIENT)
Dept: FAMILY MEDICINE | Facility: CLINIC | Age: 83
End: 2023-03-14
Payer: COMMERCIAL

## 2023-03-14 VITALS
SYSTOLIC BLOOD PRESSURE: 130 MMHG | OXYGEN SATURATION: 97 % | DIASTOLIC BLOOD PRESSURE: 70 MMHG | WEIGHT: 201.8 LBS | HEART RATE: 89 BPM | RESPIRATION RATE: 24 BRPM | TEMPERATURE: 98.3 F | HEIGHT: 65 IN | BODY MASS INDEX: 33.62 KG/M2

## 2023-03-14 DIAGNOSIS — J06.9 UPPER RESPIRATORY TRACT INFECTION, UNSPECIFIED TYPE: Primary | ICD-10-CM

## 2023-03-14 PROCEDURE — 99213 OFFICE O/P EST LOW 20 MIN: CPT | Performed by: NURSE PRACTITIONER

## 2023-03-14 RX ORDER — DOXYCYCLINE HYCLATE 100 MG
100 TABLET ORAL 2 TIMES DAILY
Qty: 14 TABLET | Refills: 0 | Status: SHIPPED | OUTPATIENT
Start: 2023-03-14 | End: 2023-05-22

## 2023-03-14 ASSESSMENT — PATIENT HEALTH QUESTIONNAIRE - PHQ9
SUM OF ALL RESPONSES TO PHQ QUESTIONS 1-9: 2
10. IF YOU CHECKED OFF ANY PROBLEMS, HOW DIFFICULT HAVE THESE PROBLEMS MADE IT FOR YOU TO DO YOUR WORK, TAKE CARE OF THINGS AT HOME, OR GET ALONG WITH OTHER PEOPLE: SOMEWHAT DIFFICULT
SUM OF ALL RESPONSES TO PHQ QUESTIONS 1-9: 2

## 2023-03-14 ASSESSMENT — PAIN SCALES - GENERAL: PAINLEVEL: NO PAIN (0)

## 2023-03-14 ASSESSMENT — ENCOUNTER SYMPTOMS: COUGH: 1

## 2023-03-14 NOTE — LETTER
Opioid / Opioid Plus Controlled Substance Agreement    This is an agreement between you and your provider about the safe and appropriate use of controlled substance/opioids prescribed by your care team. Controlled substances are medicines that can cause physical and mental dependence (abuse).    There are strict laws about having and using these medicines. We here at [facility] are committing to working with you in your efforts to get better. To support you in this work, we ll help you schedule regular office appointments for medicine refills. If we must cancel or change your appointment for any reason, we ll make sure you have enough medicine to last until your next appointment.     As a Provider, I will:    Listen carefully to your concerns and treat you with respect.     Recommend a treatment plan that I believe is in your best interest. This plan may involve therapies other than opioid pain medication.     Talk with you often about the possible benefits, and the risk of harm of any medicine that we prescribe for you.     Provide a plan on how to taper (discontinue or go off) using this medicine if the decision is made to stop its use.    As a Patient, I understand that opioid(s):     Are a controlled substance prescribed by my care team to help me function or work and manage my condition(s).     Are strong medicines and can cause serious side effects such as:    Drowsiness, which can seriously affect my driving ability    A lower breathing rate, enough to cause death    Harm to my thinking ability     Depression     Abuse of and addiction to this medicine    Need to be taken exactly as prescribed. Combining opioids with certain medicines or chemicals (such as illegal drugs, sedatives, sleeping pills, and benzodiazepines) can be dangerous or even fatal. If I stop opioids suddenly, I may have severe withdrawal symptoms.    Do not work for all types of pain nor for all patients. If they re not helpful, I may  be asked to stop them.        The risks, benefits and side effects of these medicine(s) were explained to me. I agree that:  1. I will take part in other treatments as advised by my care team. This may be psychiatry or counseling, physical therapy, behavioral therapy, group treatment or a referral to a specialist.     2. I will keep all my appointments. I understand that this is part of the monitoring of opioids. My care team may require an office visit for EVERY opioid/controlled substance refill. If I miss appointments or don t follow instructions, my care team may stop my medicine.    3. I will take my medicines as prescribed. I will not change the dose or schedule unless my care team tells me to. There will be no refills if I run out early.     4. I may be asked to come to the clinic and complete a urine drug test or complete a pill count at any time. If I don t give a urine sample or participate in a pill count, the care team may stop my medicine.    5. I will only receive prescriptions from this clinic for chronic pain. If I am treated by another provider for acute pain issues, I will tell them that I am taking opioid pain medication for chronic pain and that I have a treatment agreement with this provider. I will inform my [facility] care team within one business day if I am given a prescription for any pain medication by another healthcare provider. My [facility] care team can contact other providers and pharmacists about my use of any medicines.    6. It is up to me to make sure that I don t run out of my medicines on weekends or holidays. If my care team is willing to refill my opioid prescription without a visit, I must request refills only during office hours. Refills may take up to 3 business days to process. I will use one pharmacy to fill all my opioid and other controlled substance prescriptions. I will notify the clinic about any changes to my insurance or medication  availability.    7. I am responsible for my prescriptions. If the medicine/prescription is lost, stolen or destroyed, it will not be replaced. I also agree not to share controlled substance medicines with anyone.    8. I am aware I should not use any illegal or recreational drugs. I agree not to drink alcohol unless my care team says I can.       9. If I enroll in the [place] [facility] Cannabis program, I will tell my care team prior to my next refill.     10. I will tell my care team right away if I become pregnant, have a new medical problem treated outside of my regular clinic, or have a change in my medications.    11. I understand that this medicine can affect my thinking, judgment and reaction time. Alcohol and drugs affect the brain and body, which can affect the safety of my driving. Being under the influence of alcohol or drugs can affect my decision-making, behaviors, personal safety, and the safety of others. Driving while impaired (DWI) can occur if a person is driving, operating, or in physical control of a car, motorcycle, boat, snowmobile, ATV, motorbike, off-road vehicle, or any other motor vehicle ([place] Statute 169A.20). I understand the risk if I choose to drive or operate any vehicle or machinery.    I understand that if I do not follow any of the conditions above, my prescriptions or treatment may be stopped or changed.          Opioids  What You Need to Know    What are opioids?   Opioids are pain medicines that must be prescribed by a doctor. They are also known as narcotics.     Examples are:   1. morphine (MS Contin, Tisha)  2. oxycodone (Oxycontin)  3. oxycodone and acetaminophen (Percocet)  4. hydrocodone and acetaminophen (Vicodin, Norco)   5. fentanyl patch (Duragesic)   6. hydromorphone (Dilaudid)   7. methadone  8. codeine (Tylenol #3)     What do opioids do well?   Opioids are best for severe short-term pain such as after a surgery or injury. They may work well for cancer pain. They may  help some people with long-lasting (chronic) pain.     What do opioids NOT do well?   Opioids never get rid of pain entirely, and they don t work well for most patients with chronic pain. Opioids don t reduce swelling, one of the causes of pain.                                    Other ways to manage chronic pain and improve function include:       Treat the health problem that may be causing pain    Anti-inflammation medicines, which reduce swelling and tenderness, such as ibuprofen (Advil, Motrin) or naproxen (Aleve)    Acetaminophen (Tylenol)    Antidepressants and anti-seizure medicines, especially for nerve pain    Topical treatments such as patches or creams    Injections or nerve blocks    Chiropractic or osteopathic treatment    Acupuncture, massage, deep breathing, meditation, visual imagery, aromatherapy    Use heat or ice at the pain site    Physical therapy     Exercise    Stop smoking    Take part in therapy       Risks and side effects     Talk to your doctor before you start or decide to keep taking opioids. Possible side effects include:      Lowering your breathing rate enough to cause death    Overdose, including death, especially if taking higher than prescribed doses    Worse depression symptoms; less pleasure in things you usually enjoy    Feeling tired or sluggish    Slower thoughts or cloudy thinking    Being more sensitive to pain over time; pain is harder to control    Trouble sleeping or restless sleep    Changes in hormone levels (for example, less testosterone)    Changes in sex drive or ability to have sex    Constipation    Unsafe driving    Itching and sweating    Dizziness    Nausea, throwing up and dry mouth    What else should I know about opioids?    Opioids may lead to dependence, tolerance, or addiction.      Dependence means that if you stop or reduce the medicine too quickly, you will have withdrawal symptoms. These include loose poop (diarrhea), jitters, flu-like symptoms,  nervousness and tremors. Dependence is not the same as addiction.                       Tolerance means needing higher doses over time to get the same effect. This may increase the chance of serious side effects.      Addiction is when people improperly use a substance that harms their body, their mind or their relations with others. Use of opiates can cause a relapse of addiction if you have a history of drug or alcohol abuse.      People who have used opioids for a long time may have a lower quality of life, worse depression, higher levels of pain and more visits to doctors.    You can overdose on opioids. Take these steps to lower your risk of overdose:    1. Recognize the signs:  Signs of overdose include decrease or loss of consciousness (blackout), slowed breathing, trouble waking up and blue lips. If someone is worried about overdose, they should call 911.    2. Talk to your doctor about Narcan (naloxone).   If you are at risk for overdose, you may be given a prescription for Narcan. This medicine very quickly reverses the effects of opioids.   If you overdose, a friend or family member can give you Narcan while waiting for the ambulance. They need to know the signs of overdose and how to give Narcan.     3. Don't use alcohol or street drugs.   Taking them with opioids can cause death.    4. Do not take any of these medicines unless your doctor says it s OK. Taking these with opioids can cause death:    Benzodiazepines, such as lorazepam (Ativan), alprazolam (Xanax) or diazepam (Valium)    Muscle relaxers, such as cyclobenzaprine (Flexeril)    Sleeping pills like zolpidem (Ambien)     Other opioids      How to keep you and other people safe while taking opioids:    1. Never share your opioids with others.  Opioid medicines are regulated by the Drug Enforcement Agency ([place]ATH[place]). Selling or sharing medications is a criminal act.    2. Be sure to store opioids in a secure place, locked up if possible. Young children  can easily swallow them and overdose.    3. When you are traveling with your medicines, keep them in the original bottles. If you use a pill box, be sure you also carry a copy of your medicine list from your clinic or pharmacy.    4. Safe disposal of opioids    Most pharmacies have places to get rid of medicine, called disposal kiosks. Medicine disposal options are also available in every [facility]. Search your county and  medication disposal  to find more options. You can find more details at:  https://[email or web address].[place]./living-green/managing-unwanted-medications     I agree that my provider, clinic care team, and pharmacy may work with any city, state or federal law enforcement agency that investigates the misuse, sale, or other diversion of my controlled medicine. I will allow my provider to discuss my care with, or share a copy of, this agreement with any other treating provider, pharmacy or emergency room where I receive care.    I have read this agreement and have asked questions about anything I did not understand.    _______________________________________________________  Patient Signature - Ginger MONTEMAYOR Sweats _____________________                   Date     _______________________________________________________  Provider Signature - AKIRA Bauer CNP   _____________________                   Date     _______________________________________________________  Witness Signature (required if provider not present while patient signing)   _____________________                   Date

## 2023-03-14 NOTE — TELEPHONE ENCOUNTER
Tightness in the chest that has been going on for about a week. Nebulizer does help relieve tightness in the chest.    Headaches from sleeping 3/10 more nagging then anything.    Used delsom last night and this morning    Cough is dry. At times can be productive and produces green mucous.    Worst symptom is tightness in the chest, causes pt to not be able to do a deep breath. Denies hx of blood clots. Shortness of breath with activity and at rest. Is currently mouth breathing. Ribs hurt from coughing. Mild tightness.    Hx bronchitis, pt adamant she's experiencing early symptoms of bronchitis.    Pt was negative for covid on 3/6/23.    Advised pt may need to go to ER due to tightness in chest and shortness of breath. Pt refused to go to ER, stated she will just go to . Educated pt  would not be able to do much for her. Nurse advised pt to be seen in clinic today and after huddling with PCP if she believes pt needs to be seen sooner, nurse will call her back and relay PCP recommendations.    Pt scheduled today with Moraima:    Next 5 appointments (look out 90 days)    Mar 14, 2023  4:00 PM  (Arrive by 3:40 PM)  Provider Visit with AKIRA Bauer CNP  [facility] ([facility] ) [address] [address] [id number]  [phone]   Apr 24, 2023 10:00 AM  MTM New with Margaux Harley RPH  [facility] ([facility] ) [address] [address] [id number]  [phone]   May 09, 2023  2:00 PM  (Arrive by 1:45 PM)  Return Visit with Nico Byers MD  [facility] ([facility] ) [address] [place] [address]  [place] [id number] [phone]        Post huddle with PCP and is agreeable to plan. Called pt and updated her.    Patient was given an opportunity to ask questions, verbalized understanding of plan, and is agreeable.    Gal Arrington RN on 3/14/2023  "at 12:50 PM        Answer Assessment - Initial Assessment Questions  1. ONSET: \"When did the cough begin?\"       About a week ago, started on 3/6/23  2. SEVERITY: \"How bad is the cough today?\"       Not bad today since she started delsym this morning around 0800, it's a 12hr medication  3. SPUTUM: \"Describe the color of your sputum\" (none, dry cough; clear, white, yellow, green)      Green is thick  4. HEMOPTYSIS: \"Are you coughing up any blood?\" If so ask: \"How much?\" (flecks, streaks, tablespoons, etc.)      no  5. DIFFICULTY BREATHING: \"Are you having difficulty breathing?\" If Yes, ask: \"How bad is it?\" (e.g., mild, moderate, severe)     - MILD: No SOB at rest, mild SOB with walking, speaks normally in sentences, can lie down, no retractions, pulse < 100.     - MODERATE: SOB at rest, SOB with minimal exertion and prefers to sit, cannot lie down flat, speaks in phrases, mild retractions, audible wheezing, pulse 100-120.     - SEVERE: Very SOB at rest, speaks in single words, struggling to breathe, sitting hunched forward, retractions, pulse > 120       moderate  6. FEVER: \"Do you have a fever?\" If Yes, ask: \"What is your temperature, how was it measured, and when did it start?\"      No, maybe chills  7. CARDIAC HISTORY: \"Do you have any history of heart disease?\" (e.g., heart attack, congestive heart failure)       no  8. LUNG HISTORY: \"Do you have any history of lung disease?\"  (e.g., pulmonary embolus, asthma, emphysema)      no  9. PE RISK FACTORS: \"Do you have a history of blood clots?\" (or: recent major surgery, recent prolonged travel, bedridden)      Feb 11th fell ans fracture left wrist and right elbow  10. OTHER SYMPTOMS: \"Do you have any other symptoms?\" (e.g., runny nose, wheezing, chest pain)        Feels like a head cold and in the chest, has felt some wheezing but not currently. No chest pain. Congested and runny nose  11. PREGNANCY: \"Is there any chance you are pregnant?\" \"When was your last " "menstrual period?\"        no  12. TRAVEL: \"Have you traveled out of the country in the last month?\" (e.g., travel history, exposures)        no    Protocols used: COUGH-A-[place]      "

## 2023-03-14 NOTE — PROGRESS NOTES
"  Assessment & Plan     Upper respiratory tract infection, unspecified type  Acute; advised to take with food twice daily. Encouraged to increase fluid intake, rest, hot tea with honey, throat lozenges, gargle with salt water, ibuprofen/tylenol as needed for discomfort/fever.     - doxycycline hyclate (VIBRA-TABS) 100 MG tablet; Take 1 tablet (100 mg) by mouth 2 times daily      BMI:   Estimated body mass index is 33.58 kg/m  as calculated from the following:    Height as of this encounter: 1.651 m (5' 5\").    Weight as of this encounter: 91.5 kg (201 lb 12.8 oz).   Weight management plan: Discussed healthy diet and exercise guidelines      Return in about 5 days (around 3/19/2023) for if symptoms do not improve and/or worsen.    AKIRA Bauer CNP  [facility] RAMANA Miles is a 82 year old, presenting for the following health issues:  Cough      Cough    History of Present Illness       Reason for visit:  Cold  Symptom onset:  1-2 weeks ago  Symptoms include:  Many  Symptom intensity:  Moderate  Symptom progression:  Worsening  Had these symptoms before:  Yes  Has tried/received treatment for these symptoms:  Yes  Previous treatment was successful:  Yes  Prior treatment description:  Antibiotics for bronchitis  What makes it worse:  No  What makes it better:  Not really    She eats 0-1 servings of fruits and vegetables daily.She consumes 0 sweetened beverage(s) daily.She exercises with enough effort to increase her heart rate 9 or less minutes per day.  She exercises with enough effort to increase her heart rate 3 or less days per week.   She is taking medications regularly.     Symptoms started on 3/6/2023; wet cough; only used nebulizer once yesterday and helped with chest tightness; denies fevers. States she feels like she  Continues to worsen in stead of improving. Having headaches as well from the congestion.     covid testing negative.       Review of Systems   Respiratory: " "Positive for cough.           Objective    /70 (BP Location: Left arm, Patient Position: Sitting, Cuff Size: Adult Large)   Pulse 89   Temp 98.3  F (36.8  C) (Oral)   Resp 24   Ht 1.651 m (5' 5\")   Wt 91.5 kg (201 lb 12.8 oz)   SpO2 97%   BMI 33.58 kg/m    Body mass index is 33.58 kg/m .  Physical Exam   GENERAL: healthy, alert and no distress  HENT: normal cephalic/atraumatic, both ears: clear effusion, nose and mouth without ulcers or lesions, nasal mucosa edematous , oropharynx clear, oral mucous membranes moist and sinuses: not tender  NECK: no adenopathy, no asymmetry, masses, or scars and thyroid normal to palpation  RESP: lungs clear to auscultation - no rales, rhonchi or wheezes  CV: regular rate and rhythm, normal S1 S2, no S3 or S4, no murmur, click or rub, no peripheral edema and peripheral pulses strong              "

## 2023-03-16 ENCOUNTER — VIRTUAL VISIT (OUTPATIENT)
Dept: PSYCHOLOGY | Facility: CLINIC | Age: 83
End: 2023-03-16
Payer: COMMERCIAL

## 2023-03-16 DIAGNOSIS — F33.1 MODERATE EPISODE OF RECURRENT MAJOR DEPRESSIVE DISORDER (H): Primary | ICD-10-CM

## 2023-03-16 PROCEDURE — 90834 PSYTX W PT 45 MINUTES: CPT | Mod: VID

## 2023-03-16 NOTE — PROGRESS NOTES
[facility]                                     Progress Note    Patient Name: Ginger Marshall  Date: 3/16/23         Service Type: Individual      Session Start Time: 3:00 pm  Session End Time: 3:45 pm     Session Length: 45 min    Session #: 6    Attendees: Client attended alone    Service Modality:  Video Visit:      Provider verified identity through the following two step process.  Patient provided:  Patient is known previously to provider    Telemedicine Visit: The patient's condition can be safely assessed and treated via synchronous audio and visual telemedicine encounter.      Reason for Telemedicine Visit: Patient convenience (e.g. access to timely appointments / distance to available provider)    Originating Site (Patient Location): Patient's home    Distant Site (Provider Location): Provider Remote Setting- Home Office    Consent:  The patient/guardian has verbally consented to: the potential risks and benefits of telemedicine (video visit) versus in person care; bill my insurance or make self-payment for services provided; and responsibility for payment of non-covered services.     Patient would like the video invitation sent by:  My Chart    Mode of Communication:  Video Conference via [employer]well    Distant Location (Provider):  Off-site    As the provider I attest to compliance with applicable laws and regulations related to telemedicine.    DATA  Interactive Complexity: No  Crisis: No      Progress Since Last Session (Related to Symptoms / Goals / Homework):   Symptoms: No change continued depression, grief/loss    Homework: Partially completed      Episode of Care Goals: Minimal progress - ACTION (Actively working towards change); Intervened by reinforcing change plan / affirming steps taken     Current / Ongoing Stressors and Concerns:  Fall while walking dog, injuries in both arms, elbow, wrist, rib. In rehab PT/OT to prepare for return to home from daughter's. Approved for 4 days  7 hour a day of PCA care. Adult son who was blind and lived alone fell and passed away recently, second loss of an adult  child this year. Noticing regrets about parenting. Distress regarding sister with Alzheimer's who has left local residence and is not in contact. Limited food intake, ability to get things done around the house.     Treatment Objective(s) Addressed in This Session:    Practice boundaries and radical acceptance of reality regarding sister, sons, reality of accident  Improve quantity and quality of night time sleep / decrease daytime naps     Intervention:      DBT:  Reviewed radical acceptance and boundaries to support mental health  Provided active listening and validation. Reinforced help seeking communication and behaviors.    Motivational Interviewing  Target Behavior: radical acceptance of reality, gratitude    Stage of Change: ACTION (Actively working towards change)    MI Intervention: Expressed Empathy/Understanding, Supported Autonomy, Collaboration, Evocation and Open-ended questions     Change Talk Expressed by the Patient: Desire to change Reasons to change Activation    Provider Response to Change Talk: E - Evoked more info from patient about behavior change and A - Affirmed patient's thoughts, decisions, or attempts at behavior change    Assessments completed prior to visit:  LAURA  12/5/22  The following assessments were completed by patient for this visit:  PHQ2:   PHQ-2 ( 1999 [employer]) 2/27/2023 2/24/2023 2/20/2023 1/15/2023 11/16/2022 8/5/2022 7/6/2022   Q1: Little interest or pleasure in doing things 0 1 1 1 1 3 -   Q2: Feeling down, depressed or hopeless 0 1 1 1 1 0 -   PHQ-2 Score 0 2 2 2 2 3 -   PHQ-2 Total Score (12-17 Years)- Positive if 3 or more points; Administer PHQ-A if positive - - - - - - -   Q1: Little interest or pleasure in doing things - Several days Several days Several days Several days Nearly every day -   Q2: Feeling down, depressed or hopeless - Several days  Several days Several days Several days Not at all -   PHQ-2 Score - 2 2 2 2 3 Incomplete     PHQ9:   PHQ-9 SCORE 12/5/2022 12/12/2022 1/9/2023 2/21/2023 2/23/2023 3/14/2023 3/14/2023   PHQ-9 Total Score - - - - - - -   PHQ-9 Total Score MyChart 9 (Mild depression) 7 (Mild depression) 7 (Mild depression) - 4 (Minimal depression) - 2 (Minimal depression)   PHQ-9 Total Score 9 7 7 5 4 2 2     GAD2:   SPRING-2 12/5/2022 12/5/2022 1/3/2023 2/2/2023 2/23/2023 3/12/2023 3/12/2023   Feeling nervous, anxious, or on edge 1 1 - 1 1 1 1   Not being able to stop or control worrying 1 1 1 1 0 0 0   SPRING-2 Total Score 2 2 - 2 1 1 1     GAD7:   SPRING-7 SCORE 2/10/2017 2/22/2018 6/5/2019 6/16/2020 7/6/2022 8/5/2022 11/16/2022   Total Score - - - - 2 (minimal anxiety) 5 (mild anxiety) 2 (minimal anxiety)   Total Score 1 0 0 7 2 5 2     CAGE-AID:   CAGE-AID Total Score 12/5/2022   Total Score 0   Total Score MyChart 0 (A total score of 2 or greater is considered clinically significant)     PROMIS 10-Global Health (only subscores and total score):   PROMIS-10 Scores Only 9/21/2017 11/15/2018 3/25/2022 12/5/2022 12/5/2022 3/12/2023 3/12/2023   Global Mental Health Score 13 12 12 8 8 8 8   Global Physical Health Score 14 12 12 13 13 11 11   PROMIS TOTAL - SUBSCORES 27 24 24 21 21 19 19     Bud Suicide Severity Rating Scale (Lifetime/Recent)  Bud Suicide Severity Rating (Lifetime/Recent) 12/5/2022   Q1 Wished to be Dead (Past Month) no   Q2 Suicidal Thoughts (Past Month) no   Q3 Suicidal Thought Method no   Q4 Suicidal Intent without Specific Plan no   Q5 Suicide Intent with Specific Plan no   Q6 Suicide Behavior (Lifetime) no   Level of Risk per Screen low risk         ASSESSMENT: Current Emotional / Mental Status (status of significant symptoms):   Risk status (Self / Other harm or suicidal ideation)   Patient denies current fears or concerns for personal safety.   Patient denies current or recent suicidal ideation or  behaviors.   Patient denies current or recent homicidal ideation or behaviors.   Patient denies current or recent self injurious behavior or ideation.   Patient denies other safety concerns.   Patient reports there has been no change in risk factors since their last session.     Patient reports there has been no change in protective factors since their last session.     Recommended that patient call 911 or go to the local ED should there be a change in any of these risk factors.     Appearance:   Appropriate    Eye Contact:   Good    Psychomotor Behavior: Normal    Attitude:   Pleasant Attentive   Orientation:   All   Speech    Rate / Production: Emotional Normal     Volume:  Normal    Mood:    Depressed    Affect:    Appropriate    Thought Content:  Clear    Thought Form:  Coherent  Logical  Circumstantial   Insight:    Good      Medication Review:   No changes to current psychiatric medication(s)     Medication Compliance:   Yes     Changes in Health Issues:   None reported     Chemical Use Review:   Substance Use: Chemical use reviewed, no active concerns identified      Tobacco Use: No change in amount of tobacco use since last session.  Patient declined discussion at this time    Diagnosis:  1. Moderate episode of recurrent major depressive disorder (H)        Collateral Reports Completed:   Not Applicable    PLAN: (Patient Tasks / Therapist Tasks / Other)   Embrace radical acceptance and boundaries.     Rocío Arenas, [facility] Center[facility] 3/16/23                                                         ______________________________________________________________________  Answers for HPI/ROS submitted by the patient on 1/9/2023  If you checked off any problems, how difficult have these problems made it for you to do your work, take care of things at home, or get along with other people?: Somewhat difficult  PHQ9 TOTAL SCORE: 7                                              Individual Treatment Plan    Patient's Name: Ginger SIM  Sweats  YOB: 1940    Date of Creation: 2/8/23  Date Treatment Plan Last Reviewed/Revised:     DSM5 Diagnoses: 296.31 (F33.0) Major Depressive Disorder, Recurrent Episode, Mild With anxious distress  Psychosocial / Contextual Factors: aging, losses, generational trauma  PROMIS (reviewed every 90 days):   21 12/5/22    Referral / Collaboration:  Referral to another professional/service is not indicated at this time..    Anticipated number of session for this episode of care: 6-9 sessions  Anticipation frequency of session: Every other week  Anticipated Duration of each session: 38-52 minutes  Treatment plan will be reviewed in 90 days or when goals have been changed.       MeasurableTreatment Goal(s) related to diagnosis / functional impairment(s)  Goal 1: Patient will experience an improvement in mood and functioning as evidenced by assessment scores, self report and clinician observation    I will know I've met my goal when things feel better (paraphrase).      Objective #A (Patient Action)    Patient will increase understanding of steps in the grief process   Talk to others about losses  Use prayer practices and jena community to support well being.   Practice boundaries and radical acceptance of reality regarding sister sons, reality of accident  Status: New - Date: 2/8/23     Intervention(s)  Therapist will teach CBT, DBT and support grief processing skills, prayer practices.    Objective #B  Patient will Increase interest, engagement, and pleasure in doing things  Decrease frequency and intensity of feeling down, depressed, hopeless  Improve quantity and quality of night time sleep / decrease daytime naps  Feel less tired and more energy during the day   Improve diet, appetite, mindful eating, and / or meal planning  Identify negative self-talk and behaviors: challenge core beliefs, myths, and actions  Improve concentration, focus, and mindfulness in daily activities   Feel less fidgety,  restless or slow in daily activities / interpersonal interactions.  Status: New - Date: 2/8/23     Intervention(s)  Therapist will teach cbt, dbt,MI, mindfulness and behavioral activation and assign homework.      Patient has reviewed and agreed to the above plan.      Rocío Arenas, [facility] CareSW  February 8, 2023

## 2023-03-20 ENCOUNTER — HOSPITAL ENCOUNTER (OUTPATIENT)
Dept: ULTRASOUND IMAGING | Facility: CLINIC | Age: 83
Discharge: HOME | End: 2023-03-20
Attending: STUDENT IN AN ORGANIZED HEALTH CARE EDUCATION/TRAINING PROGRAM | Admitting: STUDENT IN AN ORGANIZED HEALTH CARE EDUCATION/TRAINING PROGRAM
Payer: COMMERCIAL

## 2023-03-20 DIAGNOSIS — E04.2 MULTINODULAR GOITER: Chronic | ICD-10-CM

## 2023-03-20 PROCEDURE — 76536 US EXAM OF HEAD AND NECK: CPT

## 2023-03-21 ENCOUNTER — MYC MEDICAL ADVICE (OUTPATIENT)
Dept: FAMILY MEDICINE | Facility: CLINIC | Age: 83
End: 2023-03-21

## 2023-03-21 ENCOUNTER — INFUSION THERAPY VISIT (OUTPATIENT)
Dept: INFUSION THERAPY | Facility: CLINIC | Age: 83
End: 2023-03-21
Attending: INTERNAL MEDICINE
Payer: COMMERCIAL

## 2023-03-21 VITALS
BODY MASS INDEX: 33.5 KG/M2 | SYSTOLIC BLOOD PRESSURE: 149 MMHG | RESPIRATION RATE: 20 BRPM | DIASTOLIC BLOOD PRESSURE: 80 MMHG | HEART RATE: 84 BPM | TEMPERATURE: 98.2 F | OXYGEN SATURATION: 95 % | WEIGHT: 201.3 LBS

## 2023-03-21 DIAGNOSIS — M81.0 OSTEOPOROSIS, UNSPECIFIED OSTEOPOROSIS TYPE, UNSPECIFIED PATHOLOGICAL FRACTURE PRESENCE: ICD-10-CM

## 2023-03-21 DIAGNOSIS — Z92.29 HISTORY OF BISPHOSPHONATE THERAPY: Primary | ICD-10-CM

## 2023-03-21 PROCEDURE — 250N000011 HC RX IP 250 OP 636: Performed by: INTERNAL MEDICINE

## 2023-03-21 PROCEDURE — 96365 THER/PROPH/DIAG IV INF INIT: CPT

## 2023-03-21 RX ORDER — METHYLPREDNISOLONE SODIUM SUCCINATE 125 MG/2ML
125 INJECTION, POWDER, LYOPHILIZED, FOR SOLUTION INTRAMUSCULAR; INTRAVENOUS
Status: CANCELLED
Start: 2023-03-21

## 2023-03-21 RX ORDER — HEPARIN SODIUM,PORCINE 10 UNIT/ML
5 VIAL (ML) INTRAVENOUS
Status: DISCONTINUED | OUTPATIENT
Start: 2023-03-21 | End: 2023-03-21 | Stop reason: HOSPADM

## 2023-03-21 RX ORDER — HEPARIN SODIUM,PORCINE 10 UNIT/ML
5 VIAL (ML) INTRAVENOUS
Status: CANCELLED | OUTPATIENT
Start: 2023-03-21

## 2023-03-21 RX ORDER — ALBUTEROL SULFATE 90 UG/1
1-2 AEROSOL, METERED RESPIRATORY (INHALATION)
Status: CANCELLED
Start: 2023-03-21

## 2023-03-21 RX ORDER — EPINEPHRINE 1 MG/ML
0.3 INJECTION, SOLUTION INTRAMUSCULAR; SUBCUTANEOUS EVERY 5 MIN PRN
Status: CANCELLED | OUTPATIENT
Start: 2023-03-21

## 2023-03-21 RX ORDER — ZOLEDRONIC ACID 5 MG/100ML
5 INJECTION, SOLUTION INTRAVENOUS ONCE
Status: CANCELLED
Start: 2023-03-21

## 2023-03-21 RX ORDER — HEPARIN SODIUM (PORCINE) LOCK FLUSH IV SOLN 100 UNIT/ML 100 UNIT/ML
5 SOLUTION INTRAVENOUS
Status: CANCELLED | OUTPATIENT
Start: 2023-03-21

## 2023-03-21 RX ORDER — MEPERIDINE HYDROCHLORIDE 25 MG/ML
25 INJECTION INTRAMUSCULAR; INTRAVENOUS; SUBCUTANEOUS EVERY 30 MIN PRN
Status: CANCELLED | OUTPATIENT
Start: 2023-03-21

## 2023-03-21 RX ORDER — DIPHENHYDRAMINE HYDROCHLORIDE 50 MG/ML
50 INJECTION INTRAMUSCULAR; INTRAVENOUS
Status: CANCELLED
Start: 2023-03-21

## 2023-03-21 RX ORDER — HEPARIN SODIUM (PORCINE) LOCK FLUSH IV SOLN 100 UNIT/ML 100 UNIT/ML
5 SOLUTION INTRAVENOUS
Status: DISCONTINUED | OUTPATIENT
Start: 2023-03-21 | End: 2023-03-21 | Stop reason: HOSPADM

## 2023-03-21 RX ORDER — ALBUTEROL SULFATE 0.83 MG/ML
2.5 SOLUTION RESPIRATORY (INHALATION)
Status: CANCELLED | OUTPATIENT
Start: 2023-03-21

## 2023-03-21 RX ORDER — ZOLEDRONIC ACID 5 MG/100ML
5 INJECTION, SOLUTION INTRAVENOUS ONCE
Status: COMPLETED | OUTPATIENT
Start: 2023-03-21 | End: 2023-03-21

## 2023-03-21 RX ADMIN — ZOLEDRONIC ACID 5 MG: 0.05 INJECTION, SOLUTION INTRAVENOUS at 11:07

## 2023-03-21 NOTE — PROGRESS NOTES
Infusion Nursing Note:  Ginger Marshall presents today for Reclast.     present during visit today: Not Applicable.    Note: N/A.    Intravenous Access:  No Intravenous access/labs at this visit.    Treatment Conditions:  Last Comprehensive Metabolic Panel:  Sodium   Date Value Ref Range Status   02/22/2023 142 136 - 145 mmol/L Final   03/27/2020 140 133 - 144 mmol/L Final     Potassium   Date Value Ref Range Status   02/22/2023 4.0 3.4 - 5.3 mmol/L Final   08/12/2022 3.9 3.4 - 5.3 mmol/L Final   03/27/2020 3.4 3.4 - 5.3 mmol/L Final     Chloride   Date Value Ref Range Status   02/22/2023 103 98 - 107 mmol/L Final   08/12/2022 111 (H) 94 - 109 mmol/L Final   03/27/2020 112 (H) 94 - 109 mmol/L Final     Carbon Dioxide   Date Value Ref Range Status   03/27/2020 24 20 - 32 mmol/L Final     Carbon Dioxide (CO2)   Date Value Ref Range Status   02/22/2023 25 22 - 29 mmol/L Final   08/12/2022 21 20 - 32 mmol/L Final     Anion Gap   Date Value Ref Range Status   02/22/2023 14 7 - 15 mmol/L Final   08/12/2022 9 3 - 14 mmol/L Final   03/27/2020 4 3 - 14 mmol/L Final     Glucose   Date Value Ref Range Status   02/22/2023 104 (H) 70 - 99 mg/dL Final   08/12/2022 105 (H) 70 - 99 mg/dL Final   03/27/2020 98 70 - 99 mg/dL Final     Comment:     Non Fasting     Urea Nitrogen   Date Value Ref Range Status   02/22/2023 11.0 8.0 - 23.0 mg/dL Final   08/12/2022 13 7 - 30 mg/dL Final   03/27/2020 18 7 - 30 mg/dL Final     Creatinine   Date Value Ref Range Status   02/22/2023 0.52 0.51 - 0.95 mg/dL Final   05/10/2021 0.59 0.52 - 1.04 mg/dL Final     GFR Estimate   Date Value Ref Range Status   02/22/2023 >90 >60 mL/min/1.73m2 Final     Comment:     eGFR calculated using 2021 CKD-EPI equation.   05/10/2021 87 >60 mL/min/[1.73_m2] Final     Comment:     Non  GFR Calc  Starting 12/18/2018, serum creatinine based estimated GFR (eGFR) will be   calculated using the Chronic Kidney Disease Epidemiology Collaboration    (CKD-EPI) equation.       Calcium   Date Value Ref Range Status   02/22/2023 9.4 8.8 - 10.2 mg/dL Final   03/18/2021 9.0 8.5 - 10.1 mg/dL Final     Bilirubin Total   Date Value Ref Range Status   01/16/2023 0.4 <=1.2 mg/dL Final   05/10/2021 0.3 0.2 - 1.3 mg/dL Final     Alkaline Phosphatase   Date Value Ref Range Status   01/16/2023 37 35 - 104 U/L Final   05/10/2021 45 40 - 150 U/L Final     ALT   Date Value Ref Range Status   01/16/2023 27 10 - 35 U/L Final   05/10/2021 36 0 - 50 U/L Final     AST   Date Value Ref Range Status   01/16/2023 31 10 - 35 U/L Final   05/10/2021 22 0 - 45 U/L Final       Post Lab Assessment:  Patient tolerated infusion    Blood return noted pre and post infusion.  Site patent and intact, free from redness, edema or discomfort.  No evidence of extravasations.  Access discontinued)     Discharge Plan:   Patient and/or family verbalized understanding of  instructions and all questions answered.  Patient  to lobby in stable condition accompanied by: self.  Patient to see provider today: No  Departure Mode: Ambulatory.  Ivana Montenegro, RN, RN

## 2023-03-21 NOTE — RESULT ENCOUNTER NOTE
Called pt and advised of result note per Dr Duy Leyva.  Pt requested scheduling phone number be sent via  because she was unable to write it down at time of call.   message sent w/ phone #.    Lashonda Ly RN, BSN  [facility]

## 2023-04-05 ENCOUNTER — TELEPHONE (OUTPATIENT)
Dept: FAMILY MEDICINE | Facility: CLINIC | Age: 83
End: 2023-04-05
Payer: COMMERCIAL

## 2023-04-05 DIAGNOSIS — Z53.9 DIAGNOSIS NOT YET DEFINED: Primary | ICD-10-CM

## 2023-04-05 PROCEDURE — G0180 MD CERTIFICATION HHA PATIENT: HCPCS | Performed by: STUDENT IN AN ORGANIZED HEALTH CARE EDUCATION/TRAINING PROGRAM

## 2023-04-05 NOTE — TELEPHONE ENCOUNTER
Rec'd [facility] from [facility]. Placed in PCP basket for review and signature. Fax [phone]    Maryuri Bautista

## 2023-04-06 ENCOUNTER — OFFICE VISIT (OUTPATIENT)
Dept: FAMILY MEDICINE | Facility: CLINIC | Age: 83
End: 2023-04-06
Payer: COMMERCIAL

## 2023-04-06 ENCOUNTER — TELEPHONE (OUTPATIENT)
Dept: PODIATRY | Facility: CLINIC | Age: 83
End: 2023-04-06

## 2023-04-06 ENCOUNTER — ANCILLARY PROCEDURE (OUTPATIENT)
Dept: GENERAL RADIOLOGY | Facility: CLINIC | Age: 83
End: 2023-04-06
Attending: PHYSICIAN ASSISTANT
Payer: COMMERCIAL

## 2023-04-06 VITALS
DIASTOLIC BLOOD PRESSURE: 78 MMHG | HEIGHT: 66 IN | WEIGHT: 201.3 LBS | RESPIRATION RATE: 19 BRPM | TEMPERATURE: 97.7 F | SYSTOLIC BLOOD PRESSURE: 136 MMHG | HEART RATE: 83 BPM | OXYGEN SATURATION: 99 % | BODY MASS INDEX: 32.35 KG/M2

## 2023-04-06 DIAGNOSIS — M25.532 LEFT WRIST PAIN: ICD-10-CM

## 2023-04-06 DIAGNOSIS — M79.674 PAIN OF RIGHT GREAT TOE: ICD-10-CM
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
## 2023-04-06 DIAGNOSIS — S92.411A CLOSED DISPLACED FRACTURE OF PROXIMAL PHALANX OF RIGHT GREAT TOE, INITIAL ENCOUNTER: ICD-10-CM

## 2023-04-06 PROCEDURE — 99213 OFFICE O/P EST LOW 20 MIN: CPT | Performed by: PHYSICIAN ASSISTANT

## 2023-04-06 PROCEDURE — 73110 X-RAY EXAM OF WRIST: CPT | Mod: TC | Performed by: RADIOLOGY

## 2023-04-06 PROCEDURE — 73660 X-RAY EXAM OF TOE(S): CPT | Mod: TC | Performed by: RADIOLOGY

## 2023-04-06 ASSESSMENT — PAIN SCALES - GENERAL: PAINLEVEL: MILD PAIN (2)

## 2023-04-06 NOTE — PATIENT INSTRUCTIONS
Take Tylenol and ibuprofen as needed. I recommend 600 mg of ibuprofen twice a day and 1000 mg of Tylenol 3 times a day.     Wear supportive shoes (hard soled).     Follow-up with orthopedics/podiatry.

## 2023-04-06 NOTE — PROGRESS NOTES
"  Assessment & Plan     Fall, initial encounter    See below.      Closed displaced fracture of proximal phalanx of right great toe, initial encounter    Discussed conservative cares with hard soled shoes, Tylenol, and ibuprofen. Refer to ortho/podiatry due to displacement.    - XR Toe Right G/E 2 Views; Future  - Orthopedic  Referral; Future      Left wrist pain    Xray appears stable per my read. Patient was unsure if she hit her wrist that was recently surgically repaired so we wanted to make sure it was stable.    - XR Wrist Left G/E 3 Views; Future                 Ministerio Sen PA-C  [facility]    Alejandro Miles is a 82 year old, presenting for the following health issues:  Musculoskeletal Problem (Fell last night, possibly broke toe)        4/6/2023     8:07 AM   Additional Questions   Roomed by Geraldine Alegre     Musculoskeletal Problem         Pain History:  When did you first notice your pain? 1 day   Have you seen anyone else for your pain? No  How has your pain affected your ability to work? Not applicable  Where in your body do you have pain? Right Big Toe   Pt fell over pet dog and fell landing on right leg and butt, while attempting to get up she heard a crack when stepping on to her right foot. Pain on Right big toe.  Instant pain that subsided right away. Pt reports that throughout the night it hurt to even have the bed sheet touch her toe.    Right elbow is painful and swollen, left wrist is in brace from previous surgery on Feb 12, 2023.      Review of Systems   Constitutional, HEENT, cardiovascular, pulmonary, gi and gu systems are negative, except as otherwise noted.        Objective    /78 (BP Location: Right arm, Patient Position: Sitting, Cuff Size: Adult Large)   Pulse 83   Temp 97.7  F (36.5  C) (Oral)   Resp 19   Ht 1.664 m (5' 5.5\")   Wt 91.3 kg (201 lb 4.8 oz)   SpO2 99%   BMI 32.99 kg/m    Body mass index is 32.99 kg/m . "       Physical Exam   GENERAL: healthy, alert and no distress  EYES: Eyes grossly normal to inspection, PERRL and conjunctivae and sclerae normal  MS: no gross musculoskeletal defects noted, no edema  SKIN: no suspicious lesions or rashes  NEURO: Normal strength and tone, mentation intact and speech normal  PSYCH: mentation appears normal, affect normal/bright  Right great toe: There is ecchymosis of the toe. Tender to palpation over entire great toe but worse proximally. 1st MTP joint is non-tender. CMS intact. Movement is not painful.    Xray - Reviewed and interpreted by me.  Appears to show mildly displaced fracture of proximal 1st phalanx. Await radiology read. Wrist x-ray continues to show multiple fractures and surgical repair but appears to be unchanged per my comparison to xray from 02/2023.

## 2023-04-06 NOTE — TELEPHONE ENCOUNTER
[facility]    Phone Message    May a detailed message be left on voicemail: yes     Reason for Call: Other: Pt has an urgent referral for rt toe fx , currently scheduled for Tues 04/11 if appropriate, if not please call pt with other options      Action Taken: Other: [facility]    Travel Screening: Not Applicable

## 2023-04-11 ENCOUNTER — DOCUMENTATION ONLY (OUTPATIENT)
Dept: LAB | Facility: CLINIC | Age: 83
End: 2023-04-11

## 2023-04-11 ENCOUNTER — OFFICE VISIT (OUTPATIENT)
Dept: PODIATRY | Facility: CLINIC | Age: 83
End: 2023-04-11
Attending: PHYSICIAN ASSISTANT
Payer: COMMERCIAL

## 2023-04-11 VITALS — WEIGHT: 201 LBS | BODY MASS INDEX: 32.94 KG/M2 | SYSTOLIC BLOOD PRESSURE: 132 MMHG | DIASTOLIC BLOOD PRESSURE: 78 MMHG

## 2023-04-11 DIAGNOSIS — M05.79 RHEUMATOID ARTHRITIS INVOLVING MULTIPLE SITES WITH POSITIVE RHEUMATOID FACTOR (H): Primary | ICD-10-CM

## 2023-04-11 DIAGNOSIS — S92.411A CLOSED DISPLACED FRACTURE OF PROXIMAL PHALANX OF RIGHT GREAT TOE, INITIAL ENCOUNTER: ICD-10-CM

## 2023-04-11 DIAGNOSIS — M81.0 OSTEOPOROSIS WITHOUT CURRENT PATHOLOGICAL FRACTURE, UNSPECIFIED OSTEOPOROSIS TYPE: ICD-10-CM

## 2023-04-11 DIAGNOSIS — Z79.899 HIGH RISK MEDICATION USE: ICD-10-CM

## 2023-04-11 PROCEDURE — 99213 OFFICE O/P EST LOW 20 MIN: CPT | Mod: 57 | Performed by: PODIATRIST

## 2023-04-11 PROCEDURE — 28490 TREAT BIG TOE FRACTURE: CPT | Mod: T5 | Performed by: PODIATRIST

## 2023-04-11 NOTE — PATIENT INSTRUCTIONS
"Thank you for choosing [facility]!    DR. XIE'S CLINIC LOCATIONS:     [facility] (Friday) TRIAGE LINE: [phone] [address]  APPOINTMENTS: [phone]   [place] [address] RADIOLOGY: [phone]    PHYSICAL THERAPY: [phone]    SET UP SURGERY: [phone]   [place] (Mon-Tues AM-Thurs) BILLING QUESTIONS: [phone]   [address] FAX: [phone]   [place] [address]            Follow up: 4 weeks or sooner with acute issues    PRICE Therapy    Many aches and pains throughout the foot and ankle can be helped with many simple treatments.  This is usually described as PRICE Therapy.      P - Protection - often times, inflammation/pain in the lower extremity is not able to improve simply because the areas involved are never allowed to rest.  Every step we take can bother the problematic area.  Protecting those areas is an important step in the healing process.  This may involve a walking cast boot, a special insert/orthotic device, an ankle brace, or simply avoiding barefoot walking.    R - Rest - in addition to protecting the foot/ankle, resting is an important, but often times difficult, treatment option.  Getting off your feet when they bother you, and specifically avoiding activities that cause pain/discomfort, are very beneficial to prevent, and treat, foot/ankle pain.  \"If there's something that makes it hurt(eg activities, shoe gear), and you keep doing the thing that makes it hurt, it's just going to keep hurting\".      I - Ice - icing regularly can help to decrease inflammation and swelling in the foot, thus decreasing pain.  Using an ice pack or a bag of frozen peas works very well.  Ice for 20 minutes multiple times per day as needed.  Do not place the ice directly on the skin as this can cause tissue damage.    C - Compression - using a compression wrap or an ACE wrap can help to decrease swelling, which can help to " decrease pain.  Wearing the wraps is generally not needed at night, but they should be worn on a regular basis when you are going to be on your feet for prolonged periods as gravity tends to pull fluids down to your feet/ankles.    E - Elevation - elevating your lower extremities multiple times daily for 15-20 minutes can help to decrease swelling, which works well in decreasing pain levels.      NSAID/Tylenol - An anti-inflammatory, like Aleve or ibuprofen, and/or a pain medication, such as Tylenol, can help to improve pain levels and get the issue resolved sooner rather than later.  Also, topical anti-inflammatory medications like Voltaren gel can be used for local treatment, with the benefit of avoiding system issues with oral medications.  Anyone with liver issues should be careful with Tylenol, and anyone with high blood pressure or heart, stomach or kidney issues should be careful with anti-inflammatories.  Please ask if you have questions about these medications, including dosage.

## 2023-04-11 NOTE — PROGRESS NOTES
"Foot & Ankle Surgery  April 11, 2023    CC: \"Broken big toe\"    I was asked to see Ginger Marshall regarding the chief complaint by:  UBALDO Sen PA-C    HPI:  Pt is a 82 year old female who presents with above complaint.  The patient was seen in family practice on 4/6/2023 for an injury to the right great toe that occurred on 4/5/2023.  She also injured her left wrist at the time.  X-rays showed a slightly displaced fracture of the head of the proximal phalanx, intra-articular DIPJ.  Patient has pain \"when I put pressure on\".  Pain 4-10 with \"walking\", worse with \"walking\".  \"Ice, ibuprofen\" for treatment.  She is wearing a strap on sandals and states \"it feels pretty good in the sandal\".  She states the toe looks mildly deviated towards the second toe, but states this is similar in appearance to her left great toe.    ROS:   Pos for CC.  The patient denies current nausea, vomiting, chills, fevers, belly pain, calf pain, chest pain or SOB.  Complete remainder of ROS is otherwise neg.    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     Menopause 1989    late 40's     Migraine 04/27/2014    resolved     Multinodular goiter      Other chronic pain     joints     Pelvic fracture (H) 05/13/2014     Rheumatic mitral stenosis      Rheumatoid arteritis (H)      S/P lumbar fusion 04/03/2017     Sleep apnea      Vitamin B12 deficiency "        SXHX:    Past Surgical History:   Procedure Laterality Date     ABDOMEN SURGERY      c sections     ARTHROSCOPY KNEE Left      ARTHROSCOPY KNEE RT/LT  2006    left     BACK SURGERY  2013    disc     BIOPSY BREAST       BREAST SURGERY      lumpectomy      BREAST SURGERY      lumpectomy     CATARACT EXTRACTION Bilateral      CATARACT IOL, RT/LT Bilateral 2010 aproximately      SECTION  1966      SECTION  1972     COLONOSCOPY  2009,     COLONOSCOPY       FINGER SURGERY Right 2019    Procedure: DISTAL ULNA RESECTION, RIGHT MIDDLE SILASTIC METACARPALPHALANGEAL EXCHANGE AND RIGHT ELBOW NODULE EXCISION.;  Surgeon: Hilario Redmond MD;  Location: [facility] OR;  Service: Orthopedics     FOOT SURGERY Left      GYN SURGERY  66,72     HAND SURGERY Right      HAND SURGERY Right      HC ESOPH/GAS REFLUX TEST W NASAL IMPED >1 HR  2012    Procedure:ESOPHAGEAL IMPEDENCE FUNCTION TEST WITH 24 HOUR PH GREATER THAN 1 HOUR; Surgeon:KAYKAY JULIO; Location:[facility]     IR LUMBAR EPIDURAL STEROID INJECTION       IR TRANSLAMINAR EPIDURAL LUMBAR INJ INCL IMAGING  2012    LESI L5-S1 at [facility]     LUMBAR FUSION       OPEN REDUCTION INTERNAL FIXATION ELBOW Right 2023    Procedure: OPEN REDUCTION INTERNAL FIXATION, RIGHT OLECRANON FRACTURE;  Surgeon: Pili Brock MD;  Location:  OR     OPEN REDUCTION INTERNAL FIXATION WRIST Left 2023    Procedure: OPEN REDUCTION INTERNAL FIXATION, LEFT DISTAL RADIUS FRACTURE;  Surgeon: Pili Brock MD;  Location:  OR     OPTICAL TRACKING SYSTEM FUSION POSTERIOR SPINE LUMBAR N/A 2017    Procedure: OPTICAL TRACKING SYSTEM FUSION SPINE POSTERIOR LUMBAR ONE LEVEL;  Surgeon: Anthony Michele MD;  Location:  OR     ORTHOPEDIC SURGERY      left foot surgery     ORTHOPEDIC SURGERY      R MCP surgery     ORTHOPEDIC SURGERY      right knee total replacement     TOTAL HIP ARTHROPLASTY  "Right      TOTAL KNEE ARTHROPLASTY Left      TOTAL KNEE ARTHROPLASTY Right      [facility] ANESTH,TOTAL HIP ARTHROPLASTY  2010    Rt hip     [facility] HAND/FINGER SURGERY UNLISTED  11/2005    right hand     [facility] TOTAL KNEE ARTHROPLASTY  2006    left        MEDS:    Current Outpatient Medications   Medication     acetaminophen (TYLENOL) 325 MG tablet     albuterol (PROAIR HFA/PROVENTIL HFA/VENTOLIN HFA) 108 (90 Base) MCG/ACT inhaler     atorvastatin (LIPITOR) 40 MG tablet     busPIRone (BUSPAR) 10 MG tablet     CALCIUM CITRATE PO     carboxymethylcellulose PF (REFRESH PLUS) 0.5 % ophthalmic solution     Cholecalciferol (VITAMIN D-3 PO)     desvenlafaxine (PRISTIQ) 100 MG 24 hr tablet     doxycycline hyclate (VIBRA-TABS) 100 MG tablet     gabapentin (NEURONTIN) 300 MG capsule     guaiFENesin (MUCINEX) 600 MG 12 hr tablet     ipratropium (ATROVENT) 0.06 % nasal spray     leucovorin (WELLCOVORIN) 5 MG tablet     Lidocaine (LIDOCARE) 4 % Patch     loratadine (CLARITIN) 10 MG tablet     Methotrexate, PF, (RASUVO) 25 MG/0.5ML autoinjector     naproxen sodium (ANAPROX) 220 MG tablet     pantoprazole (PROTONIX) 40 MG EC tablet     sucralfate (CARAFATE) 1 GM tablet     triamcinolone (KENALOG) 0.1 % external ointment     Upadacitinib ER (RINVOQ) 15 MG TB24     vitamin C (ASCORBIC ACID) 1000 MG TABS     zinc gluconate 50 MG tablet     zoledronic Acid (RECLAST) 5 MG/100ML SOLN infusion     No current facility-administered medications for this visit.       ALL:     Allergies   Allergen Reactions     Abatacept Other (See Comments)     Severe headaches  Migraine     Celebrex [Celecoxib]      Ineffective     Celecoxib Unknown and Nausea     Levaquin [Levofloxacin] Fatigue     Severe body pain     Orencia [Abatacept]      Headache     Septra [Bactrim]      Sulfa Drugs Nausea and Vomiting     \"deathly ill\"  Allergic to everything with Sulfa in it.     Sulfamethoxazole-Trimethoprim Nausea and Nausea and Vomiting     Tramadol Other (See Comments)     " "Headache  Migraine headache     Valdecoxib Unknown and Nausea     Made me \"very very ill\", might of been \"cramping\"     Adhesive Tape Rash     Plastic tape  Plastic tapes     Antihistamines, Chlorpheniramine-Type  [Alkylamines] Anxiety and Other (See Comments)       FMH:    Family History   Problem Relation Age of Onset     Arthritis Mother      Alzheimer Disease Mother      Hyperlipidemia Mother      Osteoporosis Mother      Heart Disease Father         MI ( from this)     Alcohol/Drug Father      Arthritis Sister      Hypertension Sister      Other Cancer Sister         Luekemia     Cancer Son      Diabetes Son         type 1     Neurologic Disorder Sister         Schizophrenic     Hypertension Sister      Hyperlipidemia Sister      Mental Illness Sister      Diabetes Son      Other Cancer Son      Substance Abuse Son      Glaucoma Daughter      Diabetes Other         type 2     Hyperlipidemia Other        SocHx:    Social History     Socioeconomic History     Marital status: Single     Spouse name: Not on file     Number of children: 3     Years of education: Not on file     Highest education level: Not on file   Occupational History     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   Tobacco Use     Smoking status: Former     Packs/day: 1.00     Years: 41.00     Pack years: 41.00     Types: Cigarettes     Quit date: 1999     Years since quittin.2     Smokeless tobacco: Never     Tobacco comments:     former smoker   Vaping Use     Vaping status: Never Used   Substance and Sexual Activity     Alcohol use: Yes     Alcohol/week: 0.0 standard drinks of alcohol     Comment: Occasionally,  usually wine     Drug use: No     Sexual activity: Not Currently     Partners: Male     Comment: To old for all that   Other Topics Concern     Parent/sibling w/ CABG, MI or angioplasty before 65F 55M? No      Service Not Asked     Blood Transfusions Not Asked     Caffeine Concern Yes     Comment: She " has 8 cups of coffee in the AM and is done by 8-8:30 AM.     Occupational Exposure Not Asked     Hobby Hazards Not Asked     Sleep Concern Not Asked     Stress Concern Not Asked     Weight Concern Not Asked     Special Diet Not Asked     Back Care Not Asked     Exercise Yes     Comment: Sometimes.  Gordo Redd comes 3 times a week to her building.     Bike Helmet Not Asked     Seat Belt Not Asked     Self-Exams Not Asked   Social History Narrative    She lives in a a senior living apartment building.     Social Determinants of Health     Financial Resource Strain: Low Risk  (3/3/2023)    Overall Financial Resource Strain (CARDIA)      Difficulty of Paying Living Expenses: Not hard at all   Food Insecurity: No Food Insecurity (3/3/2023)    Hunger Vital Sign      Worried About Running Out of Food in the Last Year: Never true      Ran Out of Food in the Last Year: Never true   Transportation Needs: No Transportation Needs (3/3/2023)    PRAPARE - Transportation      Lack of Transportation (Medical): No      Lack of Transportation (Non-Medical): No   Physical Activity: Not on file   Stress: Not on file   Social Connections: Not on file   Intimate Partner Violence: Not on file   Housing Stability: Not on file           EXAMINATION:  Gen:   No apparent distress  Neuro:   A&Ox3, no deficits  Psych:    Answering questions appropriately for age and situation with normal affect  Head:    NCAT  Eye:    Visual scanning without deficit  Ear:    Response to auditory stimuli wnl  Lung:    Non-labored breathing on RA noted  Abd:    NTND per patient report  Lymph:    Bruising and ecchymosis right great toe  Vasc:    Pulses palpable, CFT minimally delayed  Neuro:    Light touch sensation intact to all sensory nerve distributions without paresthesias  Derm:    Neg for nodules, lesions or ulcerations  MSK:    Mild deformity of the hallux, hallux interphalangeus.  However, no acute instability is appreciated  Calf:    Neg for  redness, swelling or tenderness      Imaging: X-rays right toe 4/6/23 - IMPRESSION: Acute mildly displaced and angulated fracture of the  distal shaft of the proximal pharynx the great toe extending to the  head and probably involving the distal articular surface.     Fusion of the PIP joints of the second and third toes. Chronic erosion  or postoperative change of the PIP joint of the fourth toe and the IP  joint of the fifth toe. Degenerative arthritis involving multiple  joints, most marked and severe at the first MTP joint.    Assessment:  82 year old female with slightly displaced intra-articular fracture of the right great toe proximal phalanx at the level of the IPJ      Medical Decision Making/Plan:  Discussed etiologies, anatomy and options  1.  Slightly displaced fracture at the head of the proximal phalanx right great toe, with extension into the IPJ  -I personally reviewed and interpreted the patient's lower extremity history pertinent to today's visit, including imaging/labs, in preparation for initiating a treatment program.  -I personally reviewed and interpreted the x-ray results  -We discussed conservative and surgical options, as well as pros, cons and risks of both approaches.  Risks discussed associated with nonsurgical management to include delayed healing and IPJ osteoarthrosis.  -The patient understands the risks and would like to proceed with nonsurgical management which I think is reasonable  -Continue with comfortable shoe gear  -RICE/NSAID versus Tylenol as needed based on symptoms.  Vies she baby the foot is much as symptoms dictate  -Follow-up in 4 weeks for reassessment and repeat x-rays.  If at that point the toe is doing better, anticipate follow-up every 4 weeks and we will make recommendations moving forward based on exam alone.  If symptoms fail to progress, repeat x-rays will be obtained  -Phalanx fracture billing performed today, 4/11/2023    Follow up:  4 weeks or sooner with  acute issues      Patient's medical history was reviewed today      Bernabe Munoz DPM [facility]  Podiatric Foot & Ankle Surgeon  [facility]  [phone]    Disclaimer: This note consists of symbols derived from keyboarding, dictation and/or voice recognition software. As a result, there may be errors in the script that have gone undetected. Please consider this when interpreting information found in this chart.

## 2023-04-11 NOTE — LETTER
"    4/11/2023         RE: Ginger Marshall  [address]  [facility] [address]        Dear Colleague,    Thank you for referring your patient, Ginger Marshall, to the [facility]. Please see a copy of my visit note below.    Foot & Ankle Surgery  April 11, 2023    CC: \"Broken big toe\"    I was asked to see Ginger Marshall regarding the chief complaint by:  UBALDO Sen PA-C    HPI:  Pt is a 82 year old female who presents with above complaint.  The patient was seen in family practice on 4/6/2023 for an injury to the right great toe that occurred on 4/5/2023.  She also injured her left wrist at the time.  X-rays showed a slightly displaced fracture of the head of the proximal phalanx, intra-articular DIPJ.  Patient has pain \"when I put pressure on\".  Pain 4-10 with \"walking\", worse with \"walking\".  \"Ice, ibuprofen\" for treatment.  She is wearing a strap on sandals and states \"it feels pretty good in the sandal\".  She states the toe looks mildly deviated towards the second toe, but states this is similar in appearance to her left great toe.    ROS:   Pos for CC.  The patient denies current nausea, vomiting, chills, fevers, belly pain, calf pain, chest pain or SOB.  Complete remainder of ROS is otherwise neg.    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     Menopause 1989    late 40's     " Migraine 2014    resolved     Multinodular goiter      Other chronic pain     joints     Pelvic fracture (H) 2014     Rheumatic mitral stenosis      Rheumatoid arteritis (H)      S/P lumbar fusion 2017     Sleep apnea      Vitamin B12 deficiency        SXHX:    Past Surgical History:   Procedure Laterality Date     ABDOMEN SURGERY      c sections     ARTHROSCOPY KNEE Left      ARTHROSCOPY KNEE RT/LT  2006    left     BACK SURGERY  2013    disc     BIOPSY BREAST       BREAST SURGERY      lumpectomy 's     BREAST SURGERY      lumpectomy     CATARACT EXTRACTION Bilateral      CATARACT IOL, RT/LT Bilateral 2010 aproximately      SECTION  1966      SECTION  1972     COLONOSCOPY  2009,     COLONOSCOPY       FINGER SURGERY Right 2019    Procedure: DISTAL ULNA RESECTION, RIGHT MIDDLE SILASTIC METACARPALPHALANGEAL EXCHANGE AND RIGHT ELBOW NODULE EXCISION.;  Surgeon: Hilario Redmond MD;  Location: [facility];  Service: Orthopedics     FOOT SURGERY Left      GYN SURGERY  66,72     HAND SURGERY Right      HAND SURGERY Right      HC ESOPH/GAS REFLUX TEST W NASAL IMPED >1 HR  2012    Procedure:ESOPHAGEAL IMPEDENCE FUNCTION TEST WITH 24 HOUR PH GREATER THAN 1 HOUR; Surgeon:KAYKAY JULIO; Location:[facility]     IR LUMBAR EPIDURAL STEROID INJECTION       IR TRANSLAMINAR EPIDURAL LUMBAR INJ INCL IMAGING  2012    LESI L5-S1 at [facility]     LUMBAR FUSION       OPEN REDUCTION INTERNAL FIXATION ELBOW Right 2023    Procedure: OPEN REDUCTION INTERNAL FIXATION, RIGHT OLECRANON FRACTURE;  Surgeon: Pili Brock MD;  Location:  OR     OPEN REDUCTION INTERNAL FIXATION WRIST Left 2023    Procedure: OPEN REDUCTION INTERNAL FIXATION, LEFT DISTAL RADIUS FRACTURE;  Surgeon: Pili Brock MD;  Location:  OR     OPTICAL TRACKING SYSTEM FUSION POSTERIOR SPINE LUMBAR N/A 2017    Procedure: OPTICAL TRACKING SYSTEM FUSION SPINE  POSTERIOR LUMBAR ONE LEVEL;  Surgeon: Anthony Michele MD;  Location: [facility]     ORTHOPEDIC SURGERY  1991    left foot surgery     ORTHOPEDIC SURGERY  1995    R MCP surgery     ORTHOPEDIC SURGERY  2007    right knee total replacement     TOTAL HIP ARTHROPLASTY Right      TOTAL KNEE ARTHROPLASTY Left      TOTAL KNEE ARTHROPLASTY Right      ZZC ANESTH,TOTAL HIP ARTHROPLASTY  2010    Rt hip     ZZC HAND/FINGER SURGERY UNLISTED  11/2005    right hand     ZZC TOTAL KNEE ARTHROPLASTY  2006    left        MEDS:    Current Outpatient Medications   Medication     acetaminophen (TYLENOL) 325 MG tablet     albuterol (PROAIR HFA/PROVENTIL HFA/VENTOLIN HFA) 108 (90 Base) MCG/ACT inhaler     atorvastatin (LIPITOR) 40 MG tablet     busPIRone (BUSPAR) 10 MG tablet     CALCIUM CITRATE PO     carboxymethylcellulose PF (REFRESH PLUS) 0.5 % ophthalmic solution     Cholecalciferol (VITAMIN D-3 PO)     desvenlafaxine (PRISTIQ) 100 MG 24 hr tablet     doxycycline hyclate (VIBRA-TABS) 100 MG tablet     gabapentin (NEURONTIN) 300 MG capsule     guaiFENesin (MUCINEX) 600 MG 12 hr tablet     ipratropium (ATROVENT) 0.06 % nasal spray     leucovorin (WELLCOVORIN) 5 MG tablet     Lidocaine (LIDOCARE) 4 % Patch     loratadine (CLARITIN) 10 MG tablet     Methotrexate, PF, (RASUVO) 25 MG/0.5ML autoinjector     naproxen sodium (ANAPROX) 220 MG tablet     pantoprazole (PROTONIX) 40 MG EC tablet     sucralfate (CARAFATE) 1 GM tablet     triamcinolone (KENALOG) 0.1 % external ointment     Upadacitinib ER (RINVOQ) 15 MG TB24     vitamin C (ASCORBIC ACID) 1000 MG TABS     zinc gluconate 50 MG tablet     zoledronic Acid (RECLAST) 5 MG/100ML SOLN infusion     No current facility-administered medications for this visit.       ALL:     Allergies   Allergen Reactions     Abatacept Other (See Comments)     Severe headaches  Migraine     Celebrex [Celecoxib]      Ineffective     Celecoxib Unknown and Nausea     Levaquin [Levofloxacin] Fatigue      "Severe body pain     Orencia [Abatacept]      Headache     Septra [Bactrim]      Sulfa Drugs Nausea and Vomiting     \"deathly ill\"  Allergic to everything with Sulfa in it.     Sulfamethoxazole-Trimethoprim Nausea and Nausea and Vomiting     Tramadol Other (See Comments)     Headache  Migraine headache     Valdecoxib Unknown and Nausea     Made me \"very very ill\", might of been \"cramping\"     Adhesive Tape Rash     Plastic tape  Plastic tapes     Antihistamines, Chlorpheniramine-Type  [Alkylamines] Anxiety and Other (See Comments)       FMH:    Family History   Problem Relation Age of Onset     Arthritis Mother      Alzheimer Disease Mother      Hyperlipidemia Mother      Osteoporosis Mother      Heart Disease Father         MI ( from this)     Alcohol/Drug Father      Arthritis Sister      Hypertension Sister      Other Cancer Sister         Luekemia     Cancer Son      Diabetes Son         type 1     Neurologic Disorder Sister         Schizophrenic     Hypertension Sister      Hyperlipidemia Sister      Mental Illness Sister      Diabetes Son      Other Cancer Son      Substance Abuse Son      Glaucoma Daughter      Diabetes Other         type 2     Hyperlipidemia Other        SocHx:    Social History     Socioeconomic History     Marital status: Single     Spouse name: Not on file     Number of children: 3     Years of education: Not on file     Highest education level: Not on file   Occupational History     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   Tobacco Use     Smoking status: Former     Packs/day: 1.00     Years: 41.00     Pack years: 41.00     Types: Cigarettes     Quit date: 1999     Years since quittin.2     Smokeless tobacco: Never     Tobacco comments:     former smoker   Vaping Use     Vaping status: Never Used   Substance and Sexual Activity     Alcohol use: Yes     Alcohol/week: 0.0 standard drinks of alcohol     Comment: Occasionally,  usually wine     Drug use: No     " Sexual activity: Not Currently     Partners: Male     Comment: To old for all that   Other Topics Concern     Parent/sibling w/ CABG, MI or angioplasty before 65F 55M? No      Service Not Asked     Blood Transfusions Not Asked     Caffeine Concern Yes     Comment: She has 8 cups of coffee in the AM and is done by 8-8:30 AM.     Occupational Exposure Not Asked     Hobby Hazards Not Asked     Sleep Concern Not Asked     Stress Concern Not Asked     Weight Concern Not Asked     Special Diet Not Asked     Back Care Not Asked     Exercise Yes     Comment: Sometimes.  Gordo Redd comes 3 times a week to her building.     Bike Helmet Not Asked     Seat Belt Not Asked     Self-Exams Not Asked   Social History Narrative    She lives in a a senior living apartment building.     Social Determinants of Health     Financial Resource Strain: Low Risk  (3/3/2023)    Overall Financial Resource Strain (CARDIA)      Difficulty of Paying Living Expenses: Not hard at all   Food Insecurity: No Food Insecurity (3/3/2023)    Hunger Vital Sign      Worried About Running Out of Food in the Last Year: Never true      Ran Out of Food in the Last Year: Never true   Transportation Needs: No Transportation Needs (3/3/2023)    PRAPARE - Transportation      Lack of Transportation (Medical): No      Lack of Transportation (Non-Medical): No   Physical Activity: Not on file   Stress: Not on file   Social Connections: Not on file   Intimate Partner Violence: Not on file   Housing Stability: Not on file           EXAMINATION:  Gen:   No apparent distress  Neuro:   A&Ox3, no deficits  Psych:    Answering questions appropriately for age and situation with normal affect  Head:    NCAT  Eye:    Visual scanning without deficit  Ear:    Response to auditory stimuli wnl  Lung:    Non-labored breathing on RA noted  Abd:    NTND per patient report  Lymph:    Bruising and ecchymosis right great toe  Vasc:    Pulses palpable, CFT minimally  delayed  Neuro:    Light touch sensation intact to all sensory nerve distributions without paresthesias  Derm:    Neg for nodules, lesions or ulcerations  MSK:    Mild deformity of the hallux, hallux interphalangeus.  However, no acute instability is appreciated  Calf:    Neg for redness, swelling or tenderness      Imaging: X-rays right toe 4/6/23 - IMPRESSION: Acute mildly displaced and angulated fracture of the  distal shaft of the proximal pharynx the great toe extending to the  head and probably involving the distal articular surface.     Fusion of the PIP joints of the second and third toes. Chronic erosion  or postoperative change of the PIP joint of the fourth toe and the IP  joint of the fifth toe. Degenerative arthritis involving multiple  joints, most marked and severe at the first MTP joint.    Assessment:  82 year old female with slightly displaced intra-articular fracture of the right great toe proximal phalanx at the level of the IPJ      Medical Decision Making/Plan:  Discussed etiologies, anatomy and options  1.  Slightly displaced fracture at the head of the proximal phalanx right great toe, with extension into the IPJ  -I personally reviewed and interpreted the patient's lower extremity history pertinent to today's visit, including imaging/labs, in preparation for initiating a treatment program.  -I personally reviewed and interpreted the x-ray results  -We discussed conservative and surgical options, as well as pros, cons and risks of both approaches.  Risks discussed associated with nonsurgical management to include delayed healing and IPJ osteoarthrosis.  -The patient understands the risks and would like to proceed with nonsurgical management which I think is reasonable  -Continue with comfortable shoe gear  -RICE/NSAID versus Tylenol as needed based on symptoms.  Vies she baby the foot is much as symptoms dictate  -Follow-up in 4 weeks for reassessment and repeat x-rays.  If at that point the  toe is doing better, anticipate follow-up every 4 weeks and we will make recommendations moving forward based on exam alone.  If symptoms fail to progress, repeat x-rays will be obtained  -Phalanx fracture billing performed today, 4/11/2023    Follow up:  4 weeks or sooner with acute issues      Patient's medical history was reviewed today      Bernabe Munoz DPM [facility]  Podiatric Foot & Ankle Surgeon  [facility]  [phone]    Disclaimer: This note consists of symbols derived from keyboarding, dictation and/or voice recognition software. As a result, there may be errors in the script that have gone undetected. Please consider this when interpreting information found in this chart.            Again, thank you for allowing me to participate in the care of your patient.        Sincerely,        Bernabe Munoz DPM, DPKAROL

## 2023-04-13 ENCOUNTER — MYC MEDICAL ADVICE (OUTPATIENT)
Dept: FAMILY MEDICINE | Facility: CLINIC | Age: 83
End: 2023-04-13
Payer: COMMERCIAL

## 2023-04-13 DIAGNOSIS — K21.9 GASTROESOPHAGEAL REFLUX DISEASE, UNSPECIFIED WHETHER ESOPHAGITIS PRESENT: Primary | Chronic | ICD-10-CM

## 2023-04-13 RX ORDER — PANTOPRAZOLE SODIUM 20 MG/1
TABLET, DELAYED RELEASE ORAL
Qty: 90 TABLET | Refills: 0 | Status: SHIPPED | OUTPATIENT
Start: 2023-04-13 | End: 2023-06-26 | Stop reason: ALTCHOICE

## 2023-04-13 NOTE — TELEPHONE ENCOUNTER
LOV with pcp 3/6/23.    Please see patient request for med adjustment and sign order if approved.    Janki Barahona RN

## 2023-04-25 ENCOUNTER — LAB (OUTPATIENT)
Dept: LAB | Facility: CLINIC | Age: 83
End: 2023-04-25
Payer: COMMERCIAL

## 2023-04-25 ENCOUNTER — TELEPHONE (OUTPATIENT)
Dept: FAMILY MEDICINE | Facility: CLINIC | Age: 83
End: 2023-04-25

## 2023-04-25 DIAGNOSIS — M81.0 OSTEOPOROSIS WITHOUT CURRENT PATHOLOGICAL FRACTURE, UNSPECIFIED OSTEOPOROSIS TYPE: ICD-10-CM

## 2023-04-25 DIAGNOSIS — D50.8 OTHER IRON DEFICIENCY ANEMIA: ICD-10-CM

## 2023-04-25 DIAGNOSIS — E67.3 HIGH VITAMIN D LEVEL: ICD-10-CM

## 2023-04-25 DIAGNOSIS — Z79.899 HIGH RISK MEDICATION USE: ICD-10-CM

## 2023-04-25 DIAGNOSIS — M05.79 RHEUMATOID ARTHRITIS INVOLVING MULTIPLE SITES WITH POSITIVE RHEUMATOID FACTOR (H): ICD-10-CM

## 2023-04-25 LAB
ALBUMIN SERPL BCG-MCNC: 4.5 G/DL (ref 3.5–5.2)
ALP SERPL-CCNC: 53 U/L (ref 35–104)
ALT SERPL W P-5'-P-CCNC: 22 U/L (ref 10–35)
AST SERPL W P-5'-P-CCNC: 29 U/L (ref 10–35)
BASOPHILS # BLD AUTO: 0 10E3/UL (ref 0–0.2)
BASOPHILS NFR BLD AUTO: 0 %
BILIRUB DIRECT SERPL-MCNC: <0.2 MG/DL (ref 0–0.3)
BILIRUB SERPL-MCNC: 0.2 MG/DL
CALCIUM SERPL-MCNC: 9.8 MG/DL (ref 8.8–10.2)
CREAT SERPL-MCNC: 0.6 MG/DL (ref 0.51–0.95)
CRP SERPL-MCNC: <3 MG/L
DEPRECATED CALCIDIOL+CALCIFEROL SERPL-MC: 66 UG/L (ref 20–75)
EOSINOPHIL # BLD AUTO: 0.2 10E3/UL (ref 0–0.7)
EOSINOPHIL NFR BLD AUTO: 3 %
ERYTHROCYTE [DISTWIDTH] IN BLOOD BY AUTOMATED COUNT: 17.1 % (ref 10–15)
ERYTHROCYTE [SEDIMENTATION RATE] IN BLOOD BY WESTERGREN METHOD: 30 MM/HR (ref 0–30)
GFR SERPL CREATININE-BSD FRML MDRD: 90 ML/MIN/1.73M2
HCT VFR BLD AUTO: 33 % (ref 35–47)
HGB BLD-MCNC: 10.1 G/DL (ref 11.7–15.7)
LYMPHOCYTES # BLD AUTO: 1.1 10E3/UL (ref 0.8–5.3)
LYMPHOCYTES NFR BLD AUTO: 21 %
MCH RBC QN AUTO: 28.5 PG (ref 26.5–33)
MCHC RBC AUTO-ENTMCNC: 30.6 G/DL (ref 31.5–36.5)
MCV RBC AUTO: 93 FL (ref 78–100)
MONOCYTES # BLD AUTO: 0.8 10E3/UL (ref 0–1.3)
MONOCYTES NFR BLD AUTO: 15 %
NEUTROPHILS # BLD AUTO: 3.1 10E3/UL (ref 1.6–8.3)
NEUTROPHILS NFR BLD AUTO: 60 %
PLATELET # BLD AUTO: 419 10E3/UL (ref 150–450)
PROT SERPL-MCNC: 7.7 G/DL (ref 6.4–8.3)
RBC # BLD AUTO: 3.55 10E6/UL (ref 3.8–5.2)
WBC # BLD AUTO: 5.2 10E3/UL (ref 4–11)

## 2023-04-25 PROCEDURE — 80076 HEPATIC FUNCTION PANEL: CPT

## 2023-04-25 PROCEDURE — 82565 ASSAY OF CREATININE: CPT

## 2023-04-25 PROCEDURE — 82306 VITAMIN D 25 HYDROXY: CPT

## 2023-04-25 PROCEDURE — 36415 COLL VENOUS BLD VENIPUNCTURE: CPT

## 2023-04-25 PROCEDURE — 85025 COMPLETE CBC W/AUTO DIFF WBC: CPT

## 2023-04-25 PROCEDURE — 85652 RBC SED RATE AUTOMATED: CPT

## 2023-04-25 PROCEDURE — 86140 C-REACTIVE PROTEIN: CPT

## 2023-04-25 PROCEDURE — 82310 ASSAY OF CALCIUM: CPT

## 2023-04-25 NOTE — TELEPHONE ENCOUNTER
Routed to Dr Duy Leyva, please review below and advise.  Would you like pt to decrease dose?    Lashonda Ly RN, BSN  [facility]

## 2023-04-25 NOTE — TELEPHONE ENCOUNTER
Brief chart review.    This plan was for PPI (pantoprazole and decreasing 40mg to 20mg daily). It looks like the dose has already been decreased to 20mg daily recently on 4/13/23. Please let her know.    She should remain on her current dose of atorvastatin as her recent lipid panel is well controlled.    Duy Leyva MD  [facility]  4/25/2023

## 2023-04-25 NOTE — TELEPHONE ENCOUNTER
Patient came in this morning stating that she had an appt with Dr. Gordillo and that she would like a med change for atorvastatin (LIPITOR) 40 MG tablet to atorvastatin (LIPITOR) 20 MG tablet.  Patient state that this was discussed back in March 06 .   Please review and advise . If appropriate please send to the [facility].    Nikia Workman Patient Rep.

## 2023-05-08 ENCOUNTER — TELEPHONE (OUTPATIENT)
Dept: PHARMACY | Facility: CLINIC | Age: 83
End: 2023-05-08
Payer: COMMERCIAL

## 2023-05-08 ENCOUNTER — MYC MEDICAL ADVICE (OUTPATIENT)
Dept: FAMILY MEDICINE | Facility: CLINIC | Age: 83
End: 2023-05-08
Payer: COMMERCIAL

## 2023-05-08 DIAGNOSIS — E78.5 HYPERLIPIDEMIA LDL GOAL <100: Chronic | ICD-10-CM

## 2023-05-08 RX ORDER — ATORVASTATIN CALCIUM 40 MG/1
40 TABLET, FILM COATED ORAL DAILY
Qty: 90 TABLET | Refills: 3 | Status: SHIPPED | OUTPATIENT
Start: 2023-05-08 | End: 2023-07-31

## 2023-05-08 NOTE — TELEPHONE ENCOUNTER
See telephone encounter from today.    Alise Harley, PharmD  Medication Therapy Management Pharmacist

## 2023-05-08 NOTE — TELEPHONE ENCOUNTER
Called patient for scheduled MTM follow-up however she needed to reschedule.  I hope to reschedule for 5/22.  She also requested refill of her atorvastatin.  We will refill using collaborative practice agreement with Dr. Duy Leyva.    Alise Harley, PharmD  Medication Therapy Management Pharmacist

## 2023-05-09 ENCOUNTER — OFFICE VISIT (OUTPATIENT)
Dept: RHEUMATOLOGY | Facility: CLINIC | Age: 83
End: 2023-05-09
Payer: COMMERCIAL

## 2023-05-09 VITALS
SYSTOLIC BLOOD PRESSURE: 128 MMHG | HEART RATE: 90 BPM | OXYGEN SATURATION: 96 % | BODY MASS INDEX: 33.07 KG/M2 | WEIGHT: 201.8 LBS | DIASTOLIC BLOOD PRESSURE: 74 MMHG

## 2023-05-09 DIAGNOSIS — M81.0 OSTEOPOROSIS, UNSPECIFIED OSTEOPOROSIS TYPE, UNSPECIFIED PATHOLOGICAL FRACTURE PRESENCE: ICD-10-CM

## 2023-05-09 DIAGNOSIS — Z79.899 HIGH RISK MEDICATION USE: ICD-10-CM

## 2023-05-09 DIAGNOSIS — M05.79 RHEUMATOID ARTHRITIS INVOLVING MULTIPLE SITES WITH POSITIVE RHEUMATOID FACTOR (H): Primary | ICD-10-CM

## 2023-05-09 PROCEDURE — 99214 OFFICE O/P EST MOD 30 MIN: CPT | Performed by: INTERNAL MEDICINE

## 2023-05-09 RX ORDER — METHOTREXATE 25 MG/.5ML
25 INJECTION, SOLUTION SUBCUTANEOUS
Qty: 2 ML | Refills: 6 | Status: SHIPPED | OUTPATIENT
Start: 2023-05-09 | End: 2023-12-01

## 2023-05-09 NOTE — PROGRESS NOTES
Rheumatology Clinic Visit      Ginger Marshall MRN# [id number]   YOB: 1940 Age: 82 year old      Date of visit: 5/09/23   PCP: Liane Torres     Chief Complaint   Patient presents with:  Rheumatoid Arthritis      Assessment and Plan     1. Seropositive Erosive Rheumatoid Arthritis ( [2009], CCP >100 [2009]; hx of rheumatoid nodule by 6/14/2011 pathology): Previously failed remicade (staph infection during therapy, but was immediately after a joint injection), orencia (migraines), HCQ (ineffective), Xeljanz (partially effective).  Sulfa allergy.  Currently on methotrexate 25 mg SQ once weekly (dose reduction in the past resulted in more symptoms), leucovorin 5 mg weekly (resolved nasal sore that didn't improve with higher daily folic acid doses), and Rinvoq 15mg daily.  RA well controlled since changing to Rinvoq.  Chronic illness, stable.    - Continue methotrexate 25mg SQ once weekly (Rasuvo)  - Continue leucovorin 5 mg every 7 days, 24 hours after MTX dose.   - Continue Rinvoq 15 mg daily  - Labs in 3 months: CBC, Creatinine, Hepatic Panel, ESR, CRP    High risk medication requiring intensive toxicity monitoring at least quarterly    2. 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  Doing okay at this time.     3. Right hip pain: Status post WALTER twice in the past. Followed by [facility] as needed.  Symptoms are degenerative in nature.    4. 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 Michele who is now at [facility].  Has been seen at [facility] and [facility] [facility].  She previously reported that a TENS unit was helpful, but reported that it was not helpful.  She does not want additional lumbar spine injections or back surgery.  She may follow-up with her spine surgeon or primary care provider for this issue, as needed.    5.   Thoracic back pain: Degenerative in nature.  And degenerative changes seen on 2021 chest CT.  Continue physical therapy exercises at home, as these are helpful.    6. Osteoporosis: was previously managed by endocrinology and she received reclast once in 2013, 2014, 2016. 12/12/2018 DEXA ordered by Dr. Vázquez showed osteoporosis.  Repeat DEXA on 2/2/2022 showed osteoporosis; no significant change of the hips; forearm osteoporosis but no previous evaluation of the forearm for comparison.  Reclast was restarted after sufficient drug holiday, with the first dose on 3/18/2021; again received in 3/21/2022 and 3/21/2023.  Plan to recheck DEXA in February 2024.   Chronic illness, stable.      - Reclast once yearly; next due on 3/21/2024  - Continue vitamin D 1000 IU daily  - Continue calcium 1200mg daily from supplement and dietary sources  - Plan to recheck DEXA in February 2024    7. Left 1st toe pain, history: 2 episodes of sudden onset left toe pain followed by diffuse left foot pain that made ambulation difficult.  Also with nodules over both Achilles tendons and the right elbow that are either rheumatoid nodules or tophi.  She reports having history of gout decades ago.  Denies ever being on colchicine or allopurinol.  Discussed colchicine to use if suspected gout flare occurs.  No flares since last seen so has not used colchicine.  - Colchicine: (MITIGARE) 0.6 MG capsule; Take 2 capsules (1.2 mg) by mouth once for 1 dose at the onset of a gout flare, followed by 1 capsule (0.6mg) 1 hour later.      8. Right 1st toe fracture, and left wrist fracture: following with orthopedic surgery and podiatry.  If surgery: hold rinvoq for 5 days prior to surgery and then restart no sooner than 14 days after surgery and only in the absence of infection and delayed wound healing.     8. Chronic pain syndrome: Documented here for historical significance only.   Management per pain clinic and/or PCP    9.  Vaccinations: Vaccinations  reviewed with Ms. Marshall.  Risks and benefits of vaccinations were discussed.    - Influenza: encouraged yearly vaccination, and to hold methotrexate for 2 weeks afterward  - [id number]: up to date  - [id number]: up to date  - Shingrix: Up to date   - COVID-19: Advised updating, and to hold methotrexate and Rinvoq for 2 weeks afterward    10. Elevated blood pressure:  Ginger to follow up with Primary Care provider regarding elevated blood pressure.     Total minutes spent in evaluation with patient, documentation, , and review of pertinent studies and chart notes: 24     Ms. Marshall verbalized agreement with and understanding of the rational for the diagnosis and treatment plan.  All questions were answered to best of my ability and the patient's satisfaction. Ms. Marshall was advised to contact the clinic with any questions that may arise after the clinic visit.      Thank you for involving me in the care of the patient    Return to clinic: 3-4 months    HPI   Ginger Marshall is a 82 year old female with a history of multiple foot surgeries, hand tendon transfers, MCP replacements (most recent being in August 2015), bilateral TKA, right WALTER, left distal radius fracture history, gout, s/p lumbar fusion, osteoporosis and seropositive (RF+, CCP+) erosive rheumatoid arthritis who presents for follow-up of rheumatoid arthritis.      Today, 5/14/2023: Toe fracture and left wrist fracture.  Left wrist is in a splint.  She is following with surgeons for these issues.  RA is doing well.  Other than the locations of the fractures she states that she has no other joint pain.    Denies fevers, chills, nausea, vomiting, constipation, diarrhea. No abdominal pain. No chest pain/pressure, palpitations, or shortness of breath. No oral or nasal sores.   No rash. No LE swelling.     Tobacco: quit in 1999  EtOH: 1 glass of wine every month at most  Drugs: None  Occupation: , retired     ROS   12 point review  of system was completed and negative except as noted in the HPI     Active Problem List     Patient Active Problem List   Diagnosis     Rheumatoid arthritis involving right hand with positive rheumatoid factor (H)     History of colonic polyps     Multinodular goiter     CARDIOVASCULAR SCREENING; LDL GOAL LESS THAN 130     Pulmonary nodule     PSEUDOPHAKIA OU     PVD (POSTERIOR VITREOUS DETACHMENT) OU     PXF (PSEUDOEXFOLIATION OF LENS CAPSULE) OD     GERD (gastroesophageal reflux disease)     Hyperlipidemia LDL goal <100     Advance Care Planning     Neuropathy     SHERRY (obstructive sleep apnea)-severe (AHI 35)     zEncounter for counseling     Anemia of chronic disease     Osteoporosis     Cornea guttata, ou     Conjunctival concretions     Stenosis, spinal, lumbar     Chronic pain     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     MGD (meibomian gland dysfunction)     Blepharitis of both eyes     Personal history of healed osteoporosis fracture     Iron malabsorption     Hyperglycemia     Chronic bilateral low back pain without sciatica     Allergic state, subsequent encounter     Anxiety     History of depression     DDD (degenerative disc disease), lumbar     Chronic right shoulder pain     Moderate episode of recurrent major depressive disorder (H)     Rheumatic mitral stenosis     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     History of bisphosphonate therapy     Morbid obesity (H)     Immunocompromised (H)     Thoracic spine pain     Fall, initial encounter     Other closed intra-articular fracture of distal end of left radius, initial encounter     Closed fracture of shaft of left ulna, unspecified fracture morphology, initial encounter     Closed fracture of olecranon process of right ulna with routine healing     Idiopathic osteoporosis     Past Medical History     Past Medical History:   Diagnosis Date     Acute posthemorrhagic anemia 10/13/2012     Closed fracture of  multiple ribs of left side, initial encounter 2019     COPD (chronic obstructive pulmonary disease) (H)     no longer occurring     Ex-smoker 1999     Gastroenteritis 2020    Gastroenteritis with norovirus     Herniated nucleus pulposus, L3-4 3/1/2017     Hip joint replacement by other means 07/10/2008     History of blood transfusion      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     History of total knee replacement 2009     Menopause 1989    late 40's     Migraine 2014    resolved     Multinodular goiter      Other chronic pain     joints     Pelvic fracture (H) 2014     Rheumatic mitral stenosis      Rheumatoid arteritis (H)      S/P lumbar fusion 2017     Sleep apnea      Vitamin B12 deficiency      Past Surgical History     Past Surgical History:   Procedure Laterality Date     ABDOMEN SURGERY      c sections     ARTHROSCOPY KNEE Left      ARTHROSCOPY KNEE RT/LT  2006    left     BACK SURGERY      disc     BIOPSY BREAST       BREAST SURGERY      lumpectomy s     BREAST SURGERY      lumpectomy     CATARACT EXTRACTION Bilateral      CATARACT IOL, RT/LT Bilateral 2010 aproximately      SECTION  1966      SECTION  1972     COLONOSCOPY  2009,     COLONOSCOPY       FINGER SURGERY Right 2019    Procedure: DISTAL ULNA RESECTION, RIGHT MIDDLE SILASTIC METACARPALPHALANGEAL EXCHANGE AND RIGHT ELBOW NODULE EXCISION.;  Surgeon: Hilario Redmond MD;  Location: [facility];  Service: Orthopedics     FOOT SURGERY Left      GYN SURGERY  66,74     HAND SURGERY Right      HAND SURGERY Right      HC ESOPH/GAS REFLUX TEST W NASAL IMPED >1 HR  2012    Procedure:ESOPHAGEAL IMPEDENCE FUNCTION TEST WITH 24 HOUR PH GREATER THAN 1 HOUR; Surgeon:KAYKAY JULIO; Location:[facility]     IR LUMBAR EPIDURAL STEROID INJECTION       IR TRANSLAMINAR EPIDURAL LUMBAR INJ INCL IMAGING  2012    [facility] L5-S1 at [facility]     LUMBAR  "FUSION       OPEN REDUCTION INTERNAL FIXATION ELBOW Right 2/12/2023    Procedure: OPEN REDUCTION INTERNAL FIXATION, RIGHT OLECRANON FRACTURE;  Surgeon: Pili Brock MD;  Location:  OR     OPEN REDUCTION INTERNAL FIXATION WRIST Left 2/12/2023    Procedure: OPEN REDUCTION INTERNAL FIXATION, LEFT DISTAL RADIUS FRACTURE;  Surgeon: Pili Brock MD;  Location:  OR     OPTICAL TRACKING SYSTEM FUSION POSTERIOR SPINE LUMBAR N/A 04/03/2017    Procedure: OPTICAL TRACKING SYSTEM FUSION SPINE POSTERIOR LUMBAR ONE LEVEL;  Surgeon: Anthony Michele MD;  Location:  OR     ORTHOPEDIC SURGERY  1991    left foot surgery     ORTHOPEDIC SURGERY  1995    R MCP surgery     ORTHOPEDIC SURGERY  2007    right knee total replacement     TOTAL HIP ARTHROPLASTY Right      TOTAL KNEE ARTHROPLASTY Left      TOTAL KNEE ARTHROPLASTY Right      ZZC ANESTH,TOTAL HIP ARTHROPLASTY  2010    Rt hip     ZZC HAND/FINGER SURGERY UNLISTED  11/2005    right hand     ZZC TOTAL KNEE ARTHROPLASTY  2006    left     Allergy     Allergies   Allergen Reactions     Abatacept Other (See Comments)     Severe headaches  Migraine     Celebrex [Roche-2 Inhibitors]      Ineffective     Celecoxib Unknown and Nausea     Levaquin [Levofloxacin] Fatigue     Severe body pain     Orencia [Abatacept]      Headache     Septra [Bactrim]      Sulfa Antibiotics Nausea and Vomiting     \"deathly ill\"  Allergic to everything with Sulfa in it.     Sulfamethoxazole-Trimethoprim Nausea and Nausea and Vomiting     Tramadol Other (See Comments)     Headache  Migraine headache     Valdecoxib Unknown and Nausea     Made me \"very very ill\", might of been \"cramping\"     Adhesive Tape Rash     Plastic tape  Plastic tapes     Antihistamines, Chlorpheniramine-Type  [Antihistamines, Chlorpheniramine-Type] Anxiety and Other (See Comments)     Current Medication List     Current Outpatient Medications   Medication Sig     acetaminophen (TYLENOL) 325 MG tablet Take 2 " tablets (650 mg) by mouth every 6 hours as needed for mild pain Max tylenol 3000 mg in 24 hours     albuterol (PROAIR HFA/PROVENTIL HFA/VENTOLIN HFA) 108 (90 Base) MCG/ACT inhaler Inhale 2 puffs into the lungs every 6 hours as needed for shortness of breath, wheezing or cough     atorvastatin (LIPITOR) 40 MG tablet Take 1 tablet (40 mg) by mouth daily     busPIRone (BUSPAR) 10 MG tablet Take 1 tablet (10 mg) by mouth 2 times daily     CALCIUM CITRATE PO Take 300 mg by mouth daily Take with meal that contains least amount of calcium     carboxymethylcellulose PF (REFRESH PLUS) 0.5 % ophthalmic solution Place 1 drop into both eyes 2 times daily as needed for dry eyes     Cholecalciferol (VITAMIN D-3 PO) Take 1,000 Units by mouth daily     desvenlafaxine (PRISTIQ) 100 MG 24 hr tablet Take 1 tablet (100 mg) by mouth daily     doxycycline hyclate (VIBRA-TABS) 100 MG tablet Take 1 tablet (100 mg) by mouth 2 times daily     gabapentin (NEURONTIN) 300 MG capsule Take 2 capsules (600 mg) by mouth 2 times daily     guaiFENesin (MUCINEX) 600 MG 12 hr tablet Take 1 tablet (600 mg) by mouth 2 times daily     ipratropium (ATROVENT) 0.06 % nasal spray 2 sprays in each nostril, 2-3 times per day as needed for rhinitis     leucovorin (WELLCOVORIN) 5 MG tablet Take 1 tablet (5 mg) by mouth every 7 days . Take 24 hours after taking Methotrexate each week.     Lidocaine (LIDOCARE) 4 % Patch Place 1 patch onto the skin every 24 hours To prevent lidocaine toxicity, patient should be patch free for 12 hrs daily. (Patient not taking: Reported on 4/6/2023)     loratadine (CLARITIN) 10 MG tablet Take 1 tablet (10 mg) by mouth 2 times daily     Methotrexate, PF, (RASUVO) 25 MG/0.5ML autoinjector Inject 0.5 mLs (25 mg) Subcutaneous every 7 days . Hold for signs of infection, and seek medical attention. Take every Thursday.     naproxen sodium (ANAPROX) 220 MG tablet Take 2 tablets (440 mg) by mouth daily as needed for moderate pain (4-6) (less  than monthly use currently)     pantoprazole (PROTONIX) 20 MG EC tablet TAKE ONE TABLET BY MOUTH ONCE DAILY 30-60 MINUTES BEFORE A MEAL     sucralfate (CARAFATE) 1 GM tablet Take 1 tablet (1 g) by mouth 4 times daily as needed for nausea (reflux) TAKE ONE TABLET BY MOUTH FOUR TIMES A DAY AS NEEDED HEARTBURN (Patient not taking: Reported on 2023)     triamcinolone (KENALOG) 0.1 % external ointment Apply 1 applicator topically daily Apply to back     Upadacitinib ER (RINVOQ) 15 MG TB24 Take 15 mg by mouth daily     vitamin C (ASCORBIC ACID) 1000 MG TABS Take 1 tablet (1,000 mg) by mouth daily     zinc gluconate 50 MG tablet Take 1 tablet (50 mg) by mouth daily     zoledronic Acid (RECLAST) 5 MG/100ML SOLN infusion Inject 5 mg into the vein once Every year (next dose 2023)     No current facility-administered medications for this visit.     Social History   See HPI    Family History     Family History   Problem Relation Age of Onset     Arthritis Mother      Alzheimer Disease Mother      Hyperlipidemia Mother      Osteoporosis Mother      Heart Disease Father         MI ( from this)     Alcohol/Drug Father      Arthritis Sister      Hypertension Sister      Other Cancer Sister         Luekemia     Cancer Son      Diabetes Son         type 1     Neurologic Disorder Sister         Schizophrenic     Hypertension Sister      Hyperlipidemia Sister      Mental Illness Sister      Diabetes Son      Other Cancer Son      Substance Abuse Son      Glaucoma Daughter      Diabetes Other         type 2     Hyperlipidemia Other      No change in family history since the previous clinic visit.    Physical Exam     Temp Readings from Last 3 Encounters:   23 97.7  F (36.5  C) (Oral)   23 98.2  F (36.8  C) (Tympanic)   23 98.3  F (36.8  C) (Oral)     BP Readings from Last 5 Encounters:   23 132/78   23 136/78   23 (!) 149/80   23 130/70   23 128/72     Pulse Readings from  "Last 1 Encounters:   05/09/23 90     Resp Readings from Last 1 Encounters:   04/06/23 19     Estimated body mass index is 33.07 kg/m  as calculated from the following:    Height as of 4/6/23: 1.664 m (5' 5.5\").    Weight as of this encounter: 91.5 kg (201 lb 12.8 oz).      GEN: NAD.   HEENT:  Anicteric, noninjected sclera. No obvious external lesions of the ear and nose. Hearing intact.  PULM: No increased work of breathing  MSK:   left wrist is in a splint.  MCPs, PIPs, DIPs, right wrist, shoulders, knees, and ankles without swelling or tenderness to palpation.  SKIN: No rash or jaundice seen  PSYCH: Alert. Appropriate.        Labs     CBC  Recent Labs   Lab Test 04/25/23  1008 02/22/23  1018 02/15/23  0642 02/12/23  1633 02/11/23  0819 01/16/23  1122 08/18/21  1054 05/10/21  1023 02/16/21  1019 11/16/20  1028   WBC 5.2 5.4  --  10.0 5.7 6.3   < > 5.0 5.7 5.7   RBC 3.55* 3.39*  --  3.10* 3.64* 3.64*   < > 3.39* 3.81 3.91   HGB 10.1* 10.0* 9.1* 9.4* 10.5* 11.0*   < > 11.3* 12.5 12.0   HCT 33.0* 32.2*  --  30.3* 34.5* 35.4   < > 35.6 38.9 37.8   MCV 93 95  --  98 95 97   < > 105* 102* 97   RDW 17.1* 16.6*  --  15.8* 15.8* 15.1*   < > 14.3 15.2* 16.1*    423  --  315 406 379   < > 333 335 361   MCH 28.5 29.5  --  30.3 28.8 30.2   < > 33.3* 32.8 30.7   MCHC 30.6* 31.1*  --  31.0* 30.4* 31.1*   < > 31.7 32.1 31.7   NEUTROPHIL 60  --   --   --  69 67   < > 57.7 65.8 60.5   LYMPH 21  --   --   --  17 18   < > 18.5 17.0 20.4   MONOCYTE 15  --   --   --  10 12   < > 17.3 12.1 12.8   EOSINOPHIL 3  --   --   --  3 3   < > 6.3 4.9 6.1   BASOPHIL 0  --   --   --  0 0   < > 0.2 0.2 0.2   ANEU  --   --   --   --   --   --   --  2.9 3.8 3.5   ALYM  --   --   --   --   --   --   --  0.9 1.0 1.2   MARYURI  --   --   --   --   --   --   --  0.9 0.7 0.7   AEOS  --   --   --   --   --   --   --  0.3 0.3 0.4   ABAS  --   --   --   --   --   --   --  0.0 0.0 0.0   ANEUTAUTO 3.1  --   --   --  4.0 4.2   < >  --   --   --    ALYMPAUTO " 1.1  --   --   --  1.0 1.1   < >  --   --   --    AMONOAUTO 0.8  --   --   --  0.6 0.8   < >  --   --   --    AEOSAUTO 0.2  --   --   --  0.2 0.2   < >  --   --   --    ABSBASO 0.0  --   --   --  0.0 0.0   < >  --   --   --     < > = values in this interval not displayed.     CMP  Recent Labs   Lab Test 04/25/23  1008 02/22/23  1018 02/12/23  1633 02/11/23  0819 01/16/23  1122 10/20/22  1256 08/18/21  1054 05/10/21  1023 03/18/21  1350 02/16/21  1019 12/18/20  0923 11/16/20  1028   NA  --  142 136 138  --   --    < >  --   --   --   --   --    POTASSIUM  --  4.0 3.8 3.9  --   --    < >  --   --   --   --   --    CHLORIDE  --  103 102 103  --   --    < >  --   --   --   --   --    CO2  --  25 25 25  --   --    < >  --   --   --   --   --    ANIONGAP  --  14 9 10  --   --    < >  --   --   --   --   --    GLC  --  104* 159* 112*  --   --    < >  --   --   --   --   --    BUN  --  11.0 10.3 12.1  --   --    < >  --   --   --   --   --    CR 0.60 0.52 0.52 0.51 0.68 0.61   < > 0.59  --  0.62  --  0.60   GFRESTIMATED 90 >90 >90 >90 88 90   < > 87  --  86  --  87   GFRESTBLACK  --   --   --   --   --   --   --  >90  --  >90  --  >90   BRANDEE 9.8 9.4 8.6* 9.3 9.2  --    < >  --    < >  --    < >  --    BILITOTAL 0.2  --   --   --  0.4 0.4   < > 0.3  --  0.4  --  0.4   ALBUMIN 4.5  --   --   --  4.5 4.1   < > 3.6  --  4.2  --  4.1   PROTTOTAL 7.7  --   --   --  7.2 7.8   < > 7.0  --  8.0  --  7.6   ALKPHOS 53  --   --   --  37 46   < > 45  --  44  --  47   AST 29  --   --   --  31 26   < > 22  --  21  --  25   ALT 22  --   --   --  27 38   < > 36  --  34  --  36    < > = values in this interval not displayed.     Calcium/VitaminD  Recent Labs   Lab Test 04/25/23  1008 02/22/23  1018 02/12/23  1633 02/11/23  0819 01/16/23  1122 03/21/22  1041 10/13/21  1447   BRANDEE 9.8 9.4 8.6*   < > 9.2   < > 9.0   VITDT 66  --   --   --  95*  --  62    < > = values in this interval not displayed.     ESR/CRP  Recent Labs   Lab Test  04/25/23  1008 01/16/23  1122 10/20/22  1256 07/12/22  1112 04/29/22  1000 02/04/22  0832 12/08/21  1053   SED 30 27 31*   < > 15 14 16   CRP  --   --   --   --  <2.9 <2.9 <2.9   CRPI <3.00 <3.00 22.90*   < >  --   --   --     < > = values in this interval not displayed.     Lipid Panel  Recent Labs   Lab Test 01/16/23  1122 12/08/21  1053 11/27/20  1043   CHOL 185 184 188   TRIG 124 101 142   HDL 74 77 89   LDL 86 87 71   NHDL 111 107 99     Hepatitis B  Recent Labs   Lab Test 10/20/22  1256 09/15/15  1159   AUSAB  --  0.11   HBCAB Nonreactive Nonreactive   HEPBANG Nonreactive Nonreactive     Hepatitis C  Recent Labs   Lab Test 10/20/22  1256 09/15/15  1159   HCVAB Nonreactive Nonreactive   Assay performance characteristics have not been established for newborns,   infants, and children       Tuberculosis Screening  Recent Labs   Lab Test 02/16/23  0000 10/20/22  1256 08/18/21  1054 05/07/18  1033 09/15/15  1200   TBRES Indeterminate* Negative Negative  --   --    TBRSLT  --   --   --  Negative Negative   TBAGN  --   --   --  0.00 0.00     Immunization History     Immunization History   Administered Date(s) Administered     COVID-19 MONOVALENT 12+ (Pfizer) 02/09/2021, 03/03/2021     FLU 6-35 months 10/24/2006, 11/14/2007, 10/22/2008, 10/21/2009     Flu, Unspecified 10/20/2013     Influenza (High Dose) 3 valent vaccine 10/28/2013, 09/23/2014, 11/11/2015, 09/23/2016, 09/24/2019     Influenza (IIV3) PF 10/12/1999, 10/26/2001, 10/19/2002, 10/30/2003, 10/28/2004, 10/21/2005, 10/26/2007, 10/21/2009, 10/05/2011, 10/13/2012     Influenza Vaccine 65+ (Fluzone HD) 10/07/2021, 11/16/2022     Influenza Vaccine >6 months (Alfuria,Fluzone) 09/24/2020     Influenza Vaccine Im 4yrs+ 4 Valent CCIIV4 09/28/2017, 09/27/2018     Mantoux Tuberculin Skin Test 11/03/2006     Pneumo Conj 13-V (2010&after) 07/29/2015     Pneumococcal 23 valent 06/26/2000, 10/26/2001, 06/01/2007, 06/02/2010     TD,PF 7+ (Tenivac) 12/10/2008, 07/20/2009      Tdap (Adult) Unspecified Formulation 12/12/2018     Zoster recombinant adjuvanted (SHINGRIX) 12/12/2018, 02/14/2019          Chart documentation done in part with Dragon Voice recognition Software. Although reviewed after completion, some word and grammatical error may remain.      Nico Byers MD

## 2023-05-09 NOTE — PATIENT INSTRUCTIONS
Do not take rinvoq for 5 days before surgery; you may restart rinvoq 5 days after surgery as long as there is no evidence of infection.

## 2023-05-11 ENCOUNTER — ANCILLARY PROCEDURE (OUTPATIENT)
Dept: GENERAL RADIOLOGY | Facility: CLINIC | Age: 83
End: 2023-05-11
Attending: PODIATRIST
Payer: COMMERCIAL

## 2023-05-11 ENCOUNTER — OFFICE VISIT (OUTPATIENT)
Dept: PODIATRY | Facility: CLINIC | Age: 83
End: 2023-05-11
Payer: COMMERCIAL

## 2023-05-11 VITALS — BODY MASS INDEX: 32.94 KG/M2 | DIASTOLIC BLOOD PRESSURE: 76 MMHG | SYSTOLIC BLOOD PRESSURE: 142 MMHG | WEIGHT: 201 LBS

## 2023-05-11 DIAGNOSIS — S92.411A CLOSED DISPLACED FRACTURE OF PROXIMAL PHALANX OF RIGHT GREAT TOE, INITIAL ENCOUNTER: ICD-10-CM

## 2023-05-11 DIAGNOSIS — S92.411A CLOSED DISPLACED FRACTURE OF PROXIMAL PHALANX OF RIGHT GREAT TOE, INITIAL ENCOUNTER: Primary | ICD-10-CM

## 2023-05-11 PROCEDURE — 99207 PR FRACTURE CARE IN GLOBAL PERIOD: CPT | Performed by: PODIATRIST

## 2023-05-11 PROCEDURE — 73660 X-RAY EXAM OF TOE(S): CPT | Mod: TC | Performed by: RADIOLOGY

## 2023-05-11 NOTE — PROGRESS NOTES
"Foot & Ankle Surgery   May 11, 2023    S:  Pt is seen today for evaluation of right great toe fracture.  The patient states that while she \"does not like the aesthetic of it\" and she has a hard time wearing sandals that go between the first and second toes, she is having little to no pain.  She had surgery yesterday on her left wrist to remove hardware.  She also states she recently found out her dog is diabetic..    Vitals:    05/11/23 1001   BP: (!) 142/76   Weight: 91.2 kg (201 lb)   '      ROS - Pos for CC.  Patient denies current nausea, vomiting, chills, fevers, belly pain, calf pain, chest pain or SOB.  Complete remainder of ROS it otherwise neg.      PE:  Gen:   No apparent distress  Eye:    Visual scanning without deficit  Ear:    Response to auditory stimuli wnl  Lung:    Non-labored breathing on RA noted  Abd:    NTND per patient report  Lymph:     Low-grade swelling but minimal bruising right great toe  Vasc:    Pulses palpable, CFT minimally delayed  Neuro:    Light touch sensation intact to all sensory nerve distributions without paresthesias  Derm:    Neg for nodules, lesions or ulcerations  MSK:    Right foot -  Mild deformity of the hallux, hallux interphalangeus.  However, no acute instability is appreciated and she has minimal discomfort on palpation at the head of the proximal phalanx  Calf:    Neg for redness, swelling or tenderness      Imaging: Similar angulation is seen at the fracture of the head of the proximal phalanx of the right great toe.  No acute changes.    Assessment:  82 year old female with healing mildly displaced fracture at the head of the proximal phalanx of the right great toe      Medical Decision Making/Plan:  Discussed etiologies, anatomy and options  1.  Healing mildly displaced fracture at the head of the proximal phalanx of the right great toe  -I personally reviewed and interpreted today's x-ray results.  No change in alignment is seen.  -She asks if the alignment can " be changed without surgery.  We discussed that while manipulation can be done, this fracture is approximately 5 weeks old and would likely be of limited or minimal benefit.    -While she states that the toe is mildly deviated compared to prior to the injury, this is not problematic enough to warrant surgical intervention  -Continue comfortable shoes  -RICE/Tylenol as needed based on pain  -She will follow-up in 4 weeks for reassessment.  I anticipate no further x-rays unless healing issues.  -Phalanx fracture billing performed 4/11/23    Follow up:  4 weeks or sooner with acute issues           Bernabe Munoz DPM FACFAS FACFAOM  Podiatric Foot & Ankle Surgeon  [facility]  [phone]    Disclaimer: This note consists of symbols derived from keyboarding, dictation and/or voice recognition software. As a result, there may be errors in the script that have gone undetected. Please consider this when interpreting information found in this chart.

## 2023-05-22 ENCOUNTER — VIRTUAL VISIT (OUTPATIENT)
Dept: PHARMACY | Facility: CLINIC | Age: 83
End: 2023-05-22
Payer: COMMERCIAL

## 2023-05-22 DIAGNOSIS — M81.0 OSTEOPOROSIS, UNSPECIFIED OSTEOPOROSIS TYPE, UNSPECIFIED PATHOLOGICAL FRACTURE PRESENCE: ICD-10-CM

## 2023-05-22 DIAGNOSIS — G62.9 NEUROPATHY: Chronic | ICD-10-CM

## 2023-05-22 DIAGNOSIS — S22.31XD CLOSED FRACTURE OF ONE RIB OF RIGHT SIDE WITH ROUTINE HEALING, SUBSEQUENT ENCOUNTER: ICD-10-CM

## 2023-05-22 DIAGNOSIS — K21.9 GASTROESOPHAGEAL REFLUX DISEASE, UNSPECIFIED WHETHER ESOPHAGITIS PRESENT: Primary | Chronic | ICD-10-CM

## 2023-05-22 DIAGNOSIS — Z91.81 PERSONAL HISTORY OF FALL: ICD-10-CM

## 2023-05-22 PROCEDURE — 99207 PR NO CHARGE LOS: CPT | Mod: VID | Performed by: PHARMACIST

## 2023-05-22 RX ORDER — OLOPATADINE HYDROCHLORIDE 2 MG/ML
1 SOLUTION/ DROPS OPHTHALMIC DAILY PRN
COMMUNITY

## 2023-05-22 RX ORDER — GABAPENTIN 300 MG/1
300 CAPSULE ORAL 2 TIMES DAILY
Qty: 60 CAPSULE | Refills: 1 | Status: CANCELLED | OUTPATIENT
Start: 2023-05-22

## 2023-05-22 NOTE — PROGRESS NOTES
Medication Therapy Management (MTM) Encounter    ASSESSMENT:                            Medication Adherence/Access: No issues identified    Peripheral neuropathy (feet and lower legs): Recommended she schedule a visit with her primary care provider to discuss her neuropathy pain further. Could consider checking a B12 and TSH to rule out causes. It would be best to find cause to avoid having to use medication. Educated her that the gabapentin could cause incoordination, trouble finding words, dizziness and leg swelling among other side effects. Need to be cautious with this medication and discontinue if she notices any of the side effects noted.  Could consider topical options like lidocaine or capsicin cream.    Falls: She may benefit from balance physical therapy, ordered today. We discussed that gabapentin could have contributed to her falling as it can cause incoordination and dizziness. I asked her to only take 300 mg twice daily and track her pain and balance/other side effects and stop if she has side effects.    GERD: Current treatment is not effective. Chronic PPI use places patient at an increased risk of C. Diff, hypomagnesemia and lower bone mineral density, these risks were reviewed with the patient.  Patient would benefit from the following changes: adding H2 blocker in the evening to help transition from PPI to H2.  If this transition is unsuccessful than she should go back on pantoprazole 40 mg daily which was effective. Patients primary care provider does not feel it is indicated to check for H. Pylori.    Osteoporosis: Patient is meeting RDI of calcium 1200mg/day. Patient is taking RDI of Vitamin D 1000 IU/day. Vitamin D is not at goal 30-75ng/dL and elevated. Recommend rechecking vitamin D since reducing her dose. Patient would benefit from continued follow-up with rheumatology and endocrinology. Continue to reevaluate Reclast therapy every 3 to 5 years.     PLAN:                            1.  Continue pantoprazole 20 mg in the morning and start famotidine 20 mg in the evening. I sent a prescription to your pharmacy. We will try to transition you over to just the famotidine but will do this gradually. If the transition doesn't work we will put you back on pantoprazole 40 mg daily.    2. You can restart the gabapentin but lower dose of 300 mg twice daily. Keep an eye on the side effects. Keep a journal on how pain is controlled and if you are having side effects. When you see Dr. Gordillo discuss whether she thinks you should continue and if she wants you to continue than ask her to send new prescription.    3. I put in a referral for balance physical therapy to help prevent future falls.    4. Make appt with Dr. Gordillo to discuss your neuropathy and possible causes. Could consider checking B12 and TSH.    Follow-up: Return in 5 weeks (on 6/26/2023) for MTM video visit with Alise Harley.    SUBJECTIVE/OBJECTIVE:                          Ginger Marshall is a 82 year old female contacted via secure video for a follow-up visit.  Today's visit is a follow-up MTM visit from 1/30/23.     Reason for visit: follow-up GERD.    Allergies/ADRs: Reviewed in chart  Past Medical History: Reviewed in chart  Tobacco: She reports that she quit smoking about 24 years ago. Her smoking use included cigarettes. She has a 41.00 pack-year smoking history. She has never used smokeless tobacco.  Alcohol: glass of wine once in a while    Medication Adherence/Access: Patient uses pill box(es).  Patient takes medications 2 time(s) per day.   Per patient, misses medication 0 times per week.   Medication barriers: none.   The patient fills medications at [facility]: YES.    Peripheral neuropathy (feet and lower legs) Current medications include: acetaminophen 1300 mg once daily (middle of day) and naproxen 440 mg daily currently. She had been on gabapentin in the past (600 mg twice daily) and wondering if she can restart. Patent reports no side  effects. She has had symptoms off and on for at least a year and it has worsened lately.     Creatinine   Date Value Ref Range Status   02/22/2023 0.52 0.51 - 0.95 mg/dL Final   05/10/2021 0.59 0.52 - 1.04 mg/dL Final     GFR Estimate   Date Value Ref Range Status   02/22/2023 >90 >60 mL/min/1.73m2 Final     Comment:     eGFR calculated using 2021 CKD-EPI equation.   05/10/2021 87 >60 mL/min/[1.73_m2] Final     Comment:     Non  GFR Calc  Starting 12/18/2018, serum creatinine based estimated GFR (eGFR) will be   calculated using the Chronic Kidney Disease Epidemiology Collaboration   (CKD-EPI) equation.       GFR Estimate If Black   Date Value Ref Range Status   05/10/2021 >90 >60 mL/min/[1.73_m2] Final     Comment:      GFR Calc  Starting 12/18/2018, serum creatinine based estimated GFR (eGFR) will be   calculated using the Chronic Kidney Disease Epidemiology Collaboration   (CKD-EPI) equation.         Lab Results   Component Value Date    B12 1,000 (H) 01/27/2017    B12 600 12/30/2015    B12 333 07/24/2013    B12 839 01/26/2011     TSH   Date Value Ref Range Status   11/27/2020 1.12 0.40 - 4.00 mU/L Final       Lab Results   Component Value Date    A1C 5.6 08/12/2022    A1C 5.4 11/27/2020    A1C 5.5 10/28/2016    A1C 6.2 04/29/2016     Falls: She lost her balance and fell on 2/11/23. She broke her big toe and left arm. Her arm is having a hard time healing. She has not done physical therapy for balance in the past. Medications that could contribute to falls: gabapentin due to incoordination and dizziness. She was previously on 600 mg twice daily of gabapentin.    GERD: Current medications include: Protonix (pantoprazole) 20 mg once daily (decreased on 4/13/23) and sucralfate 1 gm four times daily as needed (using more often with the reduced dose of pantoprazole). Patient reports more acid reflux.  The patient not have history of GI bleed. She used to take Excedrin a lot and ended up  with ulcers. That is why the PP was started.   Recently she has cut out a lot of sweets. She usually has ice cream with chocolate sauce. She is eating more fruit. She is losing weight which makes her happy. She does drink coffee and she loves oranges. Other than that she doesn't eat a lot of acidic foods.   She has not been tested for H. Pylori and primary care provider does not think this is indicated.  Patient has tried a trial of tapering the PPI but had more reflux. Patient feels that current regimen is not effective.    Osteoporosis: Current therapy includes: calcium citrate 300 mg once daily (started at last MTM visit), Vitamin D 1000 IU daily (decreased at last MTM visit) and zoledronic Acid (Reclast/Zometa) 5 mg IV annual  (has been on current therapy for 2 years this past March). Next scheduled for March 2024. Patient is not experiencing side effects. Per Dr. Byers's note from 8/2/22 copied forward- was previously managed by endocrinology and she received reclast once in 2013, 2014, 2016. 12/12/2018 DEXA ordered by Dr. Vázquez showed osteoporosis. Repeat DEXA on 2/2/2022 showed osteoporosis; no significant change of the hips; forearm osteoporosis but no previous evaluation of the forearm for comparison. Reclast was restarted after sufficient drug holiday, with the first dose on 3/18/2021; again received in 3/21/2022; plan to continue yearly for now.     She did have visit with endocrinologist on 2/27/23 and plan to continue RECLAST as scheduled in 3/21/23, DEXA in 2/2024 (if possible at [place]) and follow up after that.    DEXA History: 2/2/22  Patient is getting the following sources of dietary calcium: milk (8ounces three times daily) and cheese daily  Risk factors: post-menopausal, chronic PPI use  Last vitamin D level:  Lab Results   Component Value Date    VITDT 95 (H) 01/16/2023    VITDT 62 10/13/2021    VITDT 63 08/18/2021    VITDT 48 12/18/2020    VITDT 41 03/06/2019     Today's Vitals: There were  no vitals taken for this visit.  ----------------    I spent 34 minutes with this patient today. All changes were made via collaborative practice agreement with Duy Leyva MD. A copy of the visit note was provided to the patient's provider(s).    A summary of these recommendations was sent via [employer].    Alise Harley, MeganD  Medication Therapy Management Pharmacist      Telemedicine Visit Details  Type of service:  Video Conference via [employer]  Start Time: 2:00 PM  End Time: 2:34 PM       Medication Therapy Recommendations  GERD (gastroesophageal reflux disease)    Current Medication: pantoprazole (PROTONIX) 20 MG EC tablet   Rationale: Synergistic therapy - Needs additional medication therapy - Indication   Recommendation: Start Medication - famotidine 20 MG tablet   Status: Accepted per Provider

## 2023-05-31 ENCOUNTER — TELEPHONE (OUTPATIENT)
Dept: FAMILY MEDICINE | Facility: CLINIC | Age: 83
End: 2023-05-31

## 2023-05-31 NOTE — TELEPHONE ENCOUNTER
Reason for Call:  Appointment Request    Patient requesting this type of appt:  OV    Requested provider: Duy Leyva    Reason patient unable to be scheduled: Not within requested timeframe    When does patient want to be seen/preferred time: 1-2 weeks    Comments: Medication check and ache in arm     [facility] we [facility] you [facility] or would you prefer to receive a phone call?:   Patient would prefer a phone call   Okay to leave a detailed message?: Yes at Cell number on file:    Telephone Information:   Mobile [phone]       Call taken on 5/31/2023 at 6:33 AM by Angie Yanes

## 2023-06-01 NOTE — TELEPHONE ENCOUNTER
Sure, please use virtual visit and transition to in office.    Duy Leyva MD  [facility]  6/1/2023

## 2023-06-02 RX ORDER — FAMOTIDINE 20 MG/1
20 TABLET, FILM COATED ORAL EVERY EVENING
Qty: 30 TABLET | Refills: 1 | Status: SHIPPED | OUTPATIENT
Start: 2023-06-02 | End: 2023-06-26

## 2023-06-02 NOTE — PATIENT INSTRUCTIONS
"Recommendations from today's MTM visit:                                                       1. Continue pantoprazole 20 mg in the morning and start famotidine 20 mg in the evening. I sent a prescription to your pharmacy. We will try to transition you over to just the famotidine but will do this gradually. If the transition doesn't work we will put you back on pantoprazole 40 mg daily.    2. You can restart the gabapentin but lower dose of 300 mg twice daily. Keep an eye on the side effects. Keep a journal on how pain is controlled and if you are having side effects. When you see Dr. Gordillo discuss whether she thinks you should continue and if she wants you to continue than ask her to send new prescription.    3. I put in a referral for balance physical therapy to help prevent future falls.    4. Make appt with Dr. Gordillo to discuss your neuropathy and possible causes. Could consider checking B12 and TSH.    Follow-up: Return in 5 weeks (on 6/26/2023) for MTM video visit with Alise Harley.    It was great speaking with you today.  I value your experience and would be very thankful for your time in providing feedback in our clinic survey. In the next few days, you may receive an email or text message from [facility] with a link to a survey related to your  clinical pharmacist.\"     To schedule another MTM appointment, please call the clinic directly or you may call the MTM scheduling line at [phone] or toll-free at [phone].     My Clinical Pharmacist's contact information:                                                      Please feel free to contact me with any questions or concerns you have.      Alise Harley, PharmD  Medication Therapy Management Pharmacist    "

## 2023-06-05 ENCOUNTER — OFFICE VISIT (OUTPATIENT)
Dept: FAMILY MEDICINE | Facility: CLINIC | Age: 83
End: 2023-06-05
Payer: COMMERCIAL

## 2023-06-05 VITALS
HEART RATE: 90 BPM | WEIGHT: 202.7 LBS | OXYGEN SATURATION: 96 % | DIASTOLIC BLOOD PRESSURE: 67 MMHG | SYSTOLIC BLOOD PRESSURE: 130 MMHG | RESPIRATION RATE: 14 BRPM | BODY MASS INDEX: 33.77 KG/M2 | TEMPERATURE: 97.8 F | HEIGHT: 65 IN

## 2023-06-05 DIAGNOSIS — T14.8XXA PAIN DUE TO FRACTURE: ICD-10-CM

## 2023-06-05 DIAGNOSIS — G62.9 NEUROPATHY: Chronic | ICD-10-CM

## 2023-06-05 DIAGNOSIS — D64.9 ANEMIA, UNSPECIFIED TYPE: ICD-10-CM

## 2023-06-05 DIAGNOSIS — K21.9 GASTROESOPHAGEAL REFLUX DISEASE, UNSPECIFIED WHETHER ESOPHAGITIS PRESENT: Primary | Chronic | ICD-10-CM

## 2023-06-05 DIAGNOSIS — E04.2 NONTOXIC MULTINODULAR GOITER: ICD-10-CM

## 2023-06-05 DIAGNOSIS — E55.9 VITAMIN D DEFICIENCY: ICD-10-CM

## 2023-06-05 DIAGNOSIS — F41.9 ANXIETY: ICD-10-CM

## 2023-06-05 DIAGNOSIS — F33.1 MODERATE EPISODE OF RECURRENT MAJOR DEPRESSIVE DISORDER (H): ICD-10-CM

## 2023-06-05 LAB
DEPRECATED CALCIDIOL+CALCIFEROL SERPL-MC: 60 UG/L (ref 20–75)
FERRITIN SERPL-MCNC: 22 NG/ML (ref 11–328)
HOLD SPECIMEN: NORMAL
HOLD SPECIMEN: NORMAL
IRON BINDING CAPACITY (ROCHE): 380 UG/DL (ref 240–430)
IRON SATN MFR SERPL: 7 % (ref 15–46)
IRON SERPL-MCNC: 28 UG/DL (ref 37–145)
TSH SERPL DL<=0.005 MIU/L-ACNC: 1.24 UIU/ML (ref 0.3–4.2)
VIT B12 SERPL-MCNC: 312 PG/ML (ref 232–1245)

## 2023-06-05 PROCEDURE — 84443 ASSAY THYROID STIM HORMONE: CPT | Performed by: STUDENT IN AN ORGANIZED HEALTH CARE EDUCATION/TRAINING PROGRAM

## 2023-06-05 PROCEDURE — 99214 OFFICE O/P EST MOD 30 MIN: CPT | Performed by: STUDENT IN AN ORGANIZED HEALTH CARE EDUCATION/TRAINING PROGRAM

## 2023-06-05 PROCEDURE — 82306 VITAMIN D 25 HYDROXY: CPT | Performed by: STUDENT IN AN ORGANIZED HEALTH CARE EDUCATION/TRAINING PROGRAM

## 2023-06-05 PROCEDURE — 83540 ASSAY OF IRON: CPT | Performed by: STUDENT IN AN ORGANIZED HEALTH CARE EDUCATION/TRAINING PROGRAM

## 2023-06-05 PROCEDURE — 82607 VITAMIN B-12: CPT | Performed by: STUDENT IN AN ORGANIZED HEALTH CARE EDUCATION/TRAINING PROGRAM

## 2023-06-05 PROCEDURE — 83550 IRON BINDING TEST: CPT | Performed by: STUDENT IN AN ORGANIZED HEALTH CARE EDUCATION/TRAINING PROGRAM

## 2023-06-05 PROCEDURE — 36415 COLL VENOUS BLD VENIPUNCTURE: CPT | Performed by: STUDENT IN AN ORGANIZED HEALTH CARE EDUCATION/TRAINING PROGRAM

## 2023-06-05 PROCEDURE — 82728 ASSAY OF FERRITIN: CPT | Performed by: STUDENT IN AN ORGANIZED HEALTH CARE EDUCATION/TRAINING PROGRAM

## 2023-06-05 RX ORDER — DESVENLAFAXINE 100 MG/1
100 TABLET, EXTENDED RELEASE ORAL DAILY
Qty: 90 TABLET | Refills: 1 | Status: SHIPPED | OUTPATIENT
Start: 2023-06-05 | End: 2023-12-28

## 2023-06-05 RX ORDER — BUSPIRONE HYDROCHLORIDE 10 MG/1
10 TABLET ORAL 2 TIMES DAILY
Qty: 180 TABLET | Refills: 1 | Status: SHIPPED | OUTPATIENT
Start: 2023-06-05 | End: 2024-04-02

## 2023-06-05 ASSESSMENT — ANXIETY QUESTIONNAIRES
6. BECOMING EASILY ANNOYED OR IRRITABLE: NOT AT ALL
3. WORRYING TOO MUCH ABOUT DIFFERENT THINGS: NOT AT ALL
2. NOT BEING ABLE TO STOP OR CONTROL WORRYING: NOT AT ALL
GAD7 TOTAL SCORE: 3
IF YOU CHECKED OFF ANY PROBLEMS ON THIS QUESTIONNAIRE, HOW DIFFICULT HAVE THESE PROBLEMS MADE IT FOR YOU TO DO YOUR WORK, TAKE CARE OF THINGS AT HOME, OR GET ALONG WITH OTHER PEOPLE: SOMEWHAT DIFFICULT
GAD7 TOTAL SCORE: 3
5. BEING SO RESTLESS THAT IT IS HARD TO SIT STILL: SEVERAL DAYS
7. FEELING AFRAID AS IF SOMETHING AWFUL MIGHT HAPPEN: NOT AT ALL
1. FEELING NERVOUS, ANXIOUS, OR ON EDGE: SEVERAL DAYS

## 2023-06-05 ASSESSMENT — PATIENT HEALTH QUESTIONNAIRE - PHQ9
5. POOR APPETITE OR OVEREATING: SEVERAL DAYS
SUM OF ALL RESPONSES TO PHQ QUESTIONS 1-9: 10

## 2023-06-05 ASSESSMENT — PAIN SCALES - GENERAL: PAINLEVEL: NO PAIN (0)

## 2023-06-05 NOTE — PROGRESS NOTES
"  Assessment & Plan     Gastroesophageal reflux disease, unspecified whether esophagitis present  Attempting transition to H2B with significant osteoporosis hx. Currently on 20mg pantoprazole with some breakthrough symptoms. Will add H2B now.   - Primary Care - Care Coordination Referral    Moderate episode of recurrent major depressive disorder (H)  Anxiety  States mood is \"good\". PHQ of 9, SPRING of 7 which is increased from priors. Patient notes that she has stopped seeing therapist due to lack of privacy in home, still taking medication as prescribed. Plan to restart therapy. Follow up in 6 months or earlier if mood symptoms persist/worsen despite restarting therapy.  - desvenlafaxine (PRISTIQ) 100 MG 24 hr tablet  Dispense: 90 tablet; Refill: 1  - busPIRone (BUSPAR) 10 MG tablet  Dispense: 180 tablet; Refill: 1    Pain due to fracture  Hx of R intra-articular ulnar fracture on 2/2023. Now with deep ache in R arm since brace removed, about 2 months ago. Following with ortho who thinks may be 2/2 neck etiology and has follow up MRI already scheduled.     Neuropathy  Intermittent, on dorsal feet bilaterally but worsening in severity. Per chart review, ongoing since at least 2013. As pt with hx of vitamin B12 deficiency and thyroid nodules, will repeat B12 and TSH today. Ok to continue gabapentin.  - Extra Tube  - Vitamin B12  - TSH with free T4 reflex    Vitamin D deficiency  Last vitamin D level elevated. Will repeat today to ensure level back into normal range.     Nontoxic multinodular goiter  Discussed need for Endo visit to consider FNA. Patient overwhelmed with all medical visits. Will add Care Coordination to help assist patient with visits.  - TSH with free T4 reflex  - Primary Care - Care Coordination Referral    Anemia, unspecified type  Multifactorial over the years. Does have known longstanding DENY (EGD and colonoscopy in 2011 WNL) and ACD 2/2 RA (although recent CRP WNL). Recent blood counts with MCV " "downtrending and RDW uptrending. Will obtain ferritin, iron studies, B12.     Follow up in 6 months for mood or sooner     Duy Leyva MD  [facility]  6/5/2023    Alejandro Miles is a 83 year old, presenting for the following health issues:    Recheck Medication        6/5/2023     8:39 AM   Additional Questions   Roomed by MR   Accompanied by NA         6/5/2023     8:39 AM   Patient Reported Additional Medications   Patient reports taking the following new medications NA     Musculoskeletal Issue     There is a twinge to her RT elbow. She compares it to bumping elbow and that weird pain.   She has a pin in her elbow   Effects her when driving    History of Present Illness       Reason for visit:  Medicine review    She eats 2-3 servings of fruits and vegetables daily.She consumes 0 sweetened beverage(s) daily.She exercises with enough effort to increase her heart rate 9 or less minutes per day.  She exercises with enough effort to increase her heart rate 3 or less days per week.   She is taking medications regularly.     Hyperlipidemia Follow-Up      Are you regularly taking any medication or supplement to lower your cholesterol?   Yes- Atorvastatin    Are you having muscle aches or other side effects that you think could be caused by your cholesterol lowering medication?  No    Depression and Anxiety Follow-Up    She says the meds are \"okay\"- she cant tell if she feels different if she is on them or not. Cant see a change       How are you doing with your depression since your last visit? No change    How are you doing with your anxiety since your last visit?  No change    Are you having other symptoms that might be associated with depression or anxiety? No    Have you had a significant life event? No     Do you have any concerns with your use of alcohol or other drugs? No    Social History     Tobacco Use     Smoking status: Former     Packs/day: 1.00     Years: 41.00     Pack years: " 41.00     Types: Cigarettes     Quit date: 1999     Years since quittin.4     Smokeless tobacco: Never     Tobacco comments:     former smoker   Vaping Use     Vaping status: Never Used   Substance Use Topics     Alcohol use: Yes     Alcohol/week: 0.0 standard drinks of alcohol     Comment: Occasionally,  usually wine     Drug use: No         2023    11:28 AM 2023     8:40 AM 3/14/2023    12:35 PM   PHQ   PHQ-9 Total Score 5 4 2    2   Q9: Thoughts of better off dead/self-harm past 2 weeks Not at all Not at all Not at all    Not at all         2022     4:18 PM 2022     8:11 AM 2022     1:33 PM   SPRING-7 SCORE   Total Score 2 (minimal anxiety) 5 (mild anxiety) 2 (minimal anxiety)   Total Score 2 5 2     Medication Followup of Gabapentin 300 mg, Leucovorin 5 mg, Protonix 20 mg,     Taking Medication as prescribed: yes    Side Effects:  None    Medication Helping Symptoms:  yes    *she talked to Alise Harley and said that she was taking to high of a dose of Gabapentin and changed it to 300 mg    *HAS NOT started Pepcid     GERD  Went down to 20 mg pantoprazole and does note some breakthrough symptoms with this. Has been taking sucralfate almost every day. Hasn't started the famotidine yet.     Arm ache  Whole R arm is aching. This started about 2 months ago after brace taken off from fracture/surgery.  Setting elbow on table sends sharp pain up the arm. Brief stabbing pain.  Otherwise just an ache in there. Hx of ulnar fracture. Surgery was 23.  Affects the whole arm even into the palm of her hand. Constant. Like a toothache. Not severe but constant. No swelling or redness. No symptoms with movement. Just constant ache. Is taking the gabapentin - did seem to help. Was taking at night for sleep.   Has also taken during the day a couple of times - seems to help.   No hx of blood clots. No L arm/finger swelling. No redness or TTP along arm.   Not sure if improving at all.    Peripheral  "neuropathy  Not occurring all the time but noticing symptoms of R foot burning pain.   Every once in a while, bottom of feet will hurt so bad that cant stand up.   Seems to be getting stronger.   The gabapentin has been helpful for her. Is not noticing any problems with confusion, dizziness or any symptoms from the gabapentin.    Mood  Feels like mood is controllled - maybe not \"well\" controlled  No longer seeing therapist due to lack of privacy. Now seems to be more privacy.   Thought about reintroducing therapy     Thyroid nodules  Hasn't set up appt with Endo for this yet    Hasn't gotten opioids, no longer seeing chronic pain.  Rinvoq has been reaally helpful.    Objective    /67 (BP Location: Right arm, Patient Position: Sitting, Cuff Size: Adult Large)   Pulse 90   Temp 97.8  F (36.6  C) (Oral)   Resp 14   Ht 1.651 m (5' 5\")   Wt 91.9 kg (202 lb 11.2 oz)   SpO2 96%   BMI 33.73 kg/m    Body mass index is 33.73 kg/m .     Physical Exam   GENERAL: healthy, alert and no distress  HEAD: Normocephalic, atraumatic.   EYES: PERRL. Normal conjunctivae, sclera.   ENT: Normal oropharynx.   RESP: lungs clear to auscultation - no rales, rhonchi or wheezes  CV: regular rate and rhythm, normal S1 S2, no murmur, click, rub or gallop. No peripheral swelling noted.   ABDOMEN: soft, no TTP x4 quadrants. No hepatomegaly or masses appreciated. BS normactive.  MSK: Upper arm circumference symmetric bilaterally. RUE: Warm and well perfused. No evidence of erythema, warmth, edema.  Active ROM reproduces sharp pain from elbow that extends up arm.   SKIN: no suspicious lesions or rashes.  EXT: Warm and well perfused. No peripheral swelling. R DP 2+. L DP trace. Sensation to monofilament absent in big toe bilaterally.  NEURO: CNII-XII grossly intact. No focal deficits.  PSYCH: Groomed, dressed appropriately for weather. Normal mood with consistent affect.       "

## 2023-06-06 ENCOUNTER — PATIENT OUTREACH (OUTPATIENT)
Dept: CARE COORDINATION | Facility: CLINIC | Age: 83
End: 2023-06-06
Payer: COMMERCIAL

## 2023-06-06 NOTE — PROGRESS NOTES
Clinic Care Coordination Contact  Community Health Worker Initial Outreach    CHW Initial Information Gathering:  Referral Source: PCP  Preferred Hospital: [facility]  [phone]  Preferred Urgent Care: [facility] [place], [phone]  Current living arrangement:: I live alone  Type of residence:: Apartment  Community Resources: [facility] Programs  Supplies Currently Used at Home: None  Informal Support system:: Children  No PCP office visit in Past Year: No  Transportation means:: Regular car  CHW Additional Questions  If ED/Hospital discharge, follow-up appointment scheduled as recommended?: N/A  Medication changes made following ED/Hospital discharge?: N/A  MyChart active?: Yes  Patient sent Social Determinants of Health questionnaire?: Yes    CHW introduced self and role with CC  Patient states that someone called to schedule PT however she is confused about this and does not remember anyone suggesting PT. She expresses feeling overwhelmed with appointments and doesn't want to start PT right now. Will wait until she feels she can take it on.   CHW explained that MTM mentioned in recent AIT message that she was placing the PT Referral to help with balance to prevent future falls.   Patient states that she also has a diabetic dog who goes in for insulin and multiple appts.   Sent a message to her therapist to get back with her. Through [facility], [place]. Requested an appt via AIT.     Needs PCP f/u in 6 months or sooner as needed.   MRI at [facility] next Mon, 6/12. Reviewed future upcoming appts, patient did not have lab or rheumatology on her calendar.   While reviewing SDOH, patient mentioned she cannot stomach meals on wheels or mom meals anymore. Only receiving $23 a month for SNAP. Has been trying to look for a food shelf and can only find [employer] in which she was $38 over income.   CHW discussed [employer] referral to have $80 to use at [facility], also  discussed [employer]. Will send more info via Mobile Experience.     Patient accepts CC: Yes. Patient scheduled for assessment with RNCC on 6/8 at 11:00am. Patient noted desire to discuss managing medical appts, community resources, etc.     Courtney Parish, NATALY, B.S. [facility] Care Coordination  [facility]:  Apple Sarah Pierre  [phone]  [email or web address]

## 2023-06-08 ENCOUNTER — PATIENT OUTREACH (OUTPATIENT)
Dept: NURSING | Facility: CLINIC | Age: 83
End: 2023-06-08
Payer: COMMERCIAL

## 2023-06-08 ASSESSMENT — ACTIVITIES OF DAILY LIVING (ADL): DEPENDENT_IADLS:: CLEANING;LAUNDRY

## 2023-06-08 NOTE — LETTER
[facility]  Patient Centered Plan of Care  About Me:      Patient Name:  Ginger Marshall    YOB: 1940  Age:         83 year old   [facility] MRN:    [id number] Telephone Information:  Home Phone [phone]   Mobile [phone]   Home Phone [phone]       Address:  [address]  [place] [address] Email address:  [email or web address]      Emergency Contact(s)    Name Relationship Lgl Grd Work Phone Home Phone Mobile Phone   HARRY MCMAHAN Daughter No  [phone]            Primary language:  English     needed? No   [facility] [employer] [facility]:  [phone] op. 1  Other communication barriers:Glasses    Preferred Method of Communication:  Rosina  Current living arrangement: I live alone    Mobility Status/ Medical Equipment: Independent w/Device    Health Maintenance  Health Maintenance Reviewed: Due/Overdue   Health Maintenance Due   Topic Date Due    URINE DRUG SCREEN  Never done    EYE EXAM  08/18/2021      My Access Plan  Medical Emergency 911   Primary Clinic Line [facility] [phone]   24 Hour Appointment Line [phone] or  [phone]) (toll-free)   24 Hour Nurse Line [phone] (toll-free)   Preferred Urgent Care [facility], [phone]       Preferred Hospital [facility]  [phone]       Preferred Pharmacy [facility] - [place], [place] - [facility] [address]     Behavioral Health Crisis Line The National Suicide Prevention Lifeline at 1-508.994.8151 or Text/Call 518     My Care Team Members  Patient Care Team         Relationship Specialty Notifications Start End    Duy Leyva MD PCP - General Family Practice  11/16/22     Phone: [phone] Fax: [phone]         [address] [facility] [address]    Nico Byers MD Assigned Rheumatology Provider   11/21/21     Phone: [phone] Fax: [phone] [address]  [facility] [address]    Annmarie Smith MD Hospitalist Endocrinology, Diabetes, and Metabolism  2/10/22     Phone: [phone] Pager: [phone]         [address]  [facility] [address]    Annmarie Smith MD Assigned Endocrinology Provider   4/24/22     Phone: [phone] Pager: [phone] Fax: [phone]        [address] [place] [address] [address] [facility] [address]    Sonu Lemons DPM Referring Physician Podiatry  7/11/22     referred to derm    Phone: [phone] Fax: [phone] [address] [facility] [address]    Diya Joseph PA-C Physician Assistant Dermatology  7/11/22     Phone: [phone] Fax: [phone] [address] [facility] [address]    Duy Leyva MD Assigned PCP   8/20/22     Phone: [phone] Fax: [phone]         [address] [facility] [address]    Margaux Harley RPH Pharmacist Pharmacist  12/19/22     Phone: [phone] Fax: [phone] [address] [facility] [facility] [address]    Duy Leyva MD Assigned Pain Medication Provider   1/21/23     Phone: [phone] Fax: [phone]         [address] [facility] [address]    Sonu Lemons DPM Assigned Surgical Provider   2/4/23     Phone: [phone] Fax: [phone] [address] [facility] [address]    Margaux Harley RPH Assigned MTM Pharmacist   2/4/23     Phone: [phone] Fax: [phone]         [address] [facility] [address]    Sonu Morales, NP Assigned Pain Medication Provider   2/18/23     Phone: [phone] Fax: [phone] [address] [facility] [address]    Bernabe Munoz DPM Assigned Musculoskeletal Provider   5/6/23     Phone: [phone] Fax: [phone] [address] [address]    Lucille Awad, RN Lead Care Coordinator  Admissions 6/6/23     Phone: [phone]         Courtney Parish, MA  Community Health Worker Primary Care - CC Admissions 6/8/23     Phone: [phone]                   My Care Plans  Self Management and Treatment Plan  Care Plan  Care Plan: Specialty Referral       Problem: Patient is in need of specialty care       Long-Range Goal: Establish consistent relationship with specialist(s) as needed       Start Date: 6/8/2023 Expected End Date: 12/29/2023    Note:     Barriers: diagnosis of multiple, chronic, complex medical conditions, provider availability - wait time to complete appointments, etc.   Strengths: motivated, engaged in care coordination.   Patient expressed understanding of goal: yes  Action steps to achieve this goal:  1. I will follow up with my providers as scheduled/recommended.   - TCO: 06/05/23 - follow up scheduled as recommended 6 weeks, date unknown  - MRI: 06/12/23  - Bone stimulator pending  - MTM Pharmacy: 06/26/23  - Mental Health: 06/27/23  - Dentist: 07/20/23  - Endocrinology: 08/16/23  - Lab: 08/22/23  - Rheumatology: 08/25/23  - Primary Care Provider 12/2023 TBD  - Dexa: 02/06/24  - Podiatry 05/11/23 - declines to follow up at this time.   - Physical Therapy: TBD  2. I will discuss, review, schedule and complete recommended overdue health maintenance with my Primary Care Provider.   3. I will contact my care team with questions, concerns, support needs. I will use the clinic as a resource and I understand I can contact my clinic with 24/7 after hours services available. Care Coordinator will remain available as needed.                                Care Plan: Financial Wellbeing       Problem: Patient expresses financial resource strain       Long-Range Goal: Create an action plan to increase financial stability       Start Date: 6/8/2023 Expected End Date: 12/29/2023    Note:     Barriers: diagnosis of multiple, chronic, complex, medical conditions, limited income  Strengths: motivated, engaged in care coordination, receiving food stamps and has a  [facility] worker.   Patient expressed understanding of goal: Yes  Action steps to achieve this goal:  1. I will review the following resources:   Hot Meals:   [place] by the [facility]                          [address], [place] [address]  Note: Please do NOT go to the building located at [address]. Loaves & Fishes meals are served at the corner of [address] and [address].     Meals Served: Monday - Thursday from 5:30 - 6:30 PM  Service Location: Meals served at the [facility] front doors (2 white double doors)  [employer] - [phone] [address] [facility], [address]  2nd Thursday of each month - curbside available. 5:30-6:30 pm     [facility] - [phone]                        [address] [place] [address]  Wednesdays, 5:45-6:25 pm during school year only.      Kerry, Mother of the [facility] - [phone]        [address] [facility] [address]  Every 3rd and 4th Thursday, 5:00-6:00 pm.                    Curbside only due to COVID-19.  Those wishing a meal should enter the lower parking lot and follow directions. Meals are picked up by Door 1.     [facility] - [phone]     [address] [place], [place]   Drive Through Meals: Monday 5:30-6:30 pm     [facility] - [phone]  [address], [place] [address]  Monday thru Thursday, 5:30-6:30 pm. Curbside only due to COVID-19.     Food Pantries:   [facility] [phone] [address], [place], [address]   Partners with local charities and offers the [address] [place] [address], formerly [employer] [place] Food Shelves      [place] of the [facility] - [phone] [address] [facility] [place] [address] Hours: Tue 10:20 AM - 3:40 PM , Thu 10:20 AM - 3:40 PM  Fees: Free       The main service they offer is [employer] food shelf. They also run several  [facility] government [facility] supported programs in [facility]. This is run in partnership with [address] [place] [address] and other local social service agencies.      Demetria Beard [facility] - [phone]  [address] [place] [address]    Hours: Tue 11:30 AM - 12:30 PM  Fees: Self Pay       [employer] - [phone] [address] [place] [address]   Hours: Mon - Wed 10:00 AM - 3:00 PM , Tue 5:30 PM - 7:30 PM , Thu 5:30 PM - 7:30 PM , Thu - Fri 10:00 AM - 12:00 PM  Fees: Free       [facility]  [phone]   Serves families in [facility]. Programs can provide food, meals, and other non-financial support.      [facility] [phone] I   [address] [address], [address]   This location operates a food shelf and other social service programs. Assistance offered is extensive, and includes personal hygiene supplies, household products, laundry and household . Or speak to a  and apply for other resources such as food stamps, [facility] welfare, or more.      A second location of the [facility] is located at [address] [place] [address], [address]. They offer many of the same programs and resources as indicated above.      [address] [place] [address] Food Shelf - [phone] [address] [place] [address] Hours: Tue 10:00 PM - 3:00 PM Appt. Only, Thu 12:00 PM - 6:00 PM Appt. Only  Fees: Free       [facility]  - [phone] [address] [place] [address]  Hours: Sat 11:30 PM - 12:30 PM  Fees: Free       [facility] [phone]   [address] [place] [address], [address]   The non-profit offers Free Food for seniors and other income qualified elderly and residents. The main resource they offer from this facility is the Nutrition Assistance for Seniors (NAPS) supplemental food program. Government commodities,  vouchers, and other food assistance is provided     Holiday Lending a Helping Hand [facility] [phone] [address] , [place], [place], [address]      KAELYN [employer] - [employer] - [phone] [address] [place] [address]  Hours: Tue 1:00 PM - 3:30 PM  Fees: Free    [employer] - [facility] - Coordinated Entry   [address] [place] [address]  Fees: Free [phone]   Hours: Mon - Thu 9:00 AM - 4:00 PM      [facility] [place] [facility]   [address]  [place] [address]  Fees: Free [phone]  Hours: Mon 4:00 PM - 6:00 PM Tue 11:00 AM - 1:00 PM , Wed 4:00 PM - 6:00 PM , Thu 10:30 AM - 12:30 PM, 2-3 PM      [facility] [employer] [facility] [address] [place] [address]  Fees: Free [phone]  Hours: Tue - Sat 10:00 AM - 6:00 PM      Kerry, Mother of the [employer] - [phone] Ext. [phone]   [address] [place], [place] [address]Kxbrl: Tue 10:00 AM - 12:00 PM  Fees: Free      Additional food pantries operate in [facility] [phone]   The centers may offer groceries, hot meals, and special seasonal programs such as summer snacks for students or free [place] and Thanksgiving meals                                   Action Plans on File:            Depression          Advance Care Plans/Directives Type:   Advanced Directive - On File; POLST      My Medical and Care Information  Problem List   Patient Active Problem List   Diagnosis    Rheumatoid arthritis involving right hand with positive rheumatoid factor (H)    History of colonic polyps    Multinodular goiter    Pulmonary nodule    PSEUDOPHAKIA OU    PVD (POSTERIOR VITREOUS DETACHMENT) OU    PXF (PSEUDOEXFOLIATION OF LENS CAPSULE) OD    GERD (gastroesophageal reflux disease)    Hyperlipidemia LDL goal <100    Advance Care Planning    Neuropathy    SHERRY (obstructive sleep apnea)-severe (AHI 35)    zEncounter for counseling    Anemia of chronic disease    Osteoporosis    Cornea guttata, ou     "Conjunctival concretions    Stenosis, spinal, lumbar    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    MGD (meibomian gland dysfunction)    Prediabetes    Chronic bilateral low back pain without sciatica    Allergic state, subsequent encounter    Anxiety    DDD (degenerative disc disease), lumbar    Chronic right shoulder pain    Moderate episode of recurrent major depressive disorder (H)    Rheumatic mitral stenosis    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    History of bisphosphonate therapy    Morbid obesity (H)    Immunocompromised (H)    Thoracic spine pain    Other closed intra-articular fracture of distal end of left radius, initial encounter    Closed fracture of shaft of left ulna, unspecified fracture morphology, initial encounter    Closed fracture of olecranon process of right ulna with routine healing      Current Medications and Allergies:    Allergies   Allergen Reactions    Abatacept Other (See Comments)     Severe headaches  Migraine    Celebrex [Roche-2 Inhibitors]      Ineffective    Celecoxib Unknown and Nausea    Levaquin [Levofloxacin] Fatigue     Severe body pain    Orencia [Abatacept]      Headache    Septra [Bactrim]     Sulfa Antibiotics Nausea and Vomiting     \"deathly ill\"  Allergic to everything with Sulfa in it.    Sulfamethoxazole-Trimethoprim Nausea and Nausea and Vomiting    Tramadol Other (See Comments)     Headache  Migraine headache    Valdecoxib Unknown and Nausea     Made me \"very very ill\", might of been \"cramping\"    Adhesive Tape Rash     Plastic tape  Plastic tapes    Antihistamines, Chlorpheniramine-Type  [Antihistamines, Chlorpheniramine-Type] Anxiety and Other (See Comments)      Current Outpatient Medications:     acetaminophen (TYLENOL) 325 MG tablet, Take 2 tablets (650 mg) by mouth every 6 hours as needed for mild pain Max tylenol 3000 mg in 24 hours, Disp: , Rfl:     albuterol (PROAIR HFA/PROVENTIL HFA/VENTOLIN HFA) 108 (90 Base) " MCG/ACT inhaler, Inhale 2 puffs into the lungs every 6 hours as needed for shortness of breath, wheezing or cough (Patient not taking: Reported on 5/22/2023), Disp: , Rfl:     atorvastatin (LIPITOR) 40 MG tablet, Take 1 tablet (40 mg) by mouth daily, Disp: 90 tablet, Rfl: 3    busPIRone (BUSPAR) 10 MG tablet, Take 1 tablet (10 mg) by mouth 2 times daily, Disp: 180 tablet, Rfl: 1    CALCIUM CITRATE PO, Take 300 mg by mouth daily Take with meal that contains least amount of calcium, Disp: , Rfl:     carboxymethylcellulose PF (REFRESH PLUS) 0.5 % ophthalmic solution, Place 1 drop into both eyes 2 times daily as needed for dry eyes, Disp: , Rfl:     Cholecalciferol (VITAMIN D-3 PO), Take 1,000 Units by mouth daily, Disp: , Rfl:     desvenlafaxine (PRISTIQ) 100 MG 24 hr tablet, Take 1 tablet (100 mg) by mouth daily, Disp: 90 tablet, Rfl: 1    famotidine (PEPCID) 20 MG tablet, Take 1 tablet (20 mg) by mouth every evening, Disp: 30 tablet, Rfl: 1    gabapentin (NEURONTIN) 300 MG capsule, Take 2 capsules (600 mg) by mouth 2 times daily (Patient taking differently: Take 300 mg by mouth 2 times daily), Disp: , Rfl:     guaiFENesin (MUCINEX) 600 MG 12 hr tablet, Take 1 tablet (600 mg) by mouth 2 times daily, Disp: 20 tablet, Rfl: 0    ipratropium (ATROVENT) 0.06 % nasal spray, 2 sprays in each nostril, 2-3 times per day as needed for rhinitis, Disp: 15 mL, Rfl: 1    leucovorin (WELLCOVORIN) 5 MG tablet, Take 1 tablet (5 mg) by mouth every 7 days . Take 24 hours after taking Methotrexate each week., Disp: 13 tablet, Rfl: 2    Lidocaine (LIDOCARE) 4 % Patch, Place 1 patch onto the skin every 24 hours To prevent lidocaine toxicity, patient should be patch free for 12 hrs daily., Disp: , Rfl:     loratadine (CLARITIN) 10 MG tablet, Take 1 tablet (10 mg) by mouth 2 times daily, Disp: , Rfl:     Methotrexate, PF, (RASUVO) 25 MG/0.5ML autoinjector, Inject 0.5 mLs (25 mg) Subcutaneous every 7 days . Hold for signs of infection, and  seek medical attention. Take every Thursday., Disp: 2 mL, Rfl: 6    naproxen sodium (ANAPROX) 220 MG tablet, Take 2 tablets (440 mg) by mouth daily as needed for moderate pain (4-6) (less than monthly use currently), Disp: , Rfl:     olopatadine (PATADAY) 0.2 % ophthalmic solution, Place 1 drop into both eyes daily as needed Uses mostly in Spring, Disp: , Rfl:     pantoprazole (PROTONIX) 20 MG EC tablet, TAKE ONE TABLET BY MOUTH ONCE DAILY 30-60 MINUTES BEFORE A MEAL, Disp: 90 tablet, Rfl: 0    sucralfate (CARAFATE) 1 GM tablet, Take 1 tablet (1 g) by mouth 4 times daily as needed for nausea (reflux) TAKE ONE TABLET BY MOUTH FOUR TIMES A DAY AS NEEDED HEARTBURN, Disp: , Rfl:     triamcinolone (KENALOG) 0.1 % external ointment, Apply 1 applicator topically daily Apply to back, Disp: , Rfl:     Upadacitinib ER (RINVOQ) 15 MG TB24, Take 15 mg by mouth daily, Disp: 30 tablet, Rfl: 6    vitamin C (ASCORBIC ACID) 1000 MG TABS, Take 1 tablet (1,000 mg) by mouth daily, Disp: , Rfl:     zinc gluconate 50 MG tablet, Take 1 tablet (50 mg) by mouth daily, Disp: , Rfl:     zoledronic Acid (RECLAST) 5 MG/100ML SOLN infusion, Inject 5 mg into the vein once Every year (next dose March 2023), Disp: , Rfl:      Care Coordination Start Date: 6/5/2023   Frequency of Care Coordination: monthly       Form Last Updated: 06/08/2023

## 2023-06-08 NOTE — PROGRESS NOTES
Clinic Care Coordination Contact  OUTREACH    Referral Information:  Referral Source: PCP  Primary Diagnosis: Psychosocial    Chief Complaint   Patient presents with     Clinic Care Coordination - Initial     Scheduled RNCC Assessment      Universal Utilization: 78% Risk of Admission or ED Visit   Clinic Utilization  Difficulty keeping appointments:: No  Compliance Concerns: No  No-Show Concerns: No  No PCP office visit in Past Year: No  Utilization    Hospital Admissions  1             ED Visits  2             No Show Count (past year)  1                Current as of: 6/7/2023  4:51 PM            Clinical Concerns:  Current Medical Concerns:    Patient Active Problem List   Diagnosis     Rheumatoid arthritis involving right hand with positive rheumatoid factor (H)     History of colonic polyps     Multinodular goiter     Pulmonary nodule     PSEUDOPHAKIA OU     PVD (POSTERIOR VITREOUS DETACHMENT) OU     PXF (PSEUDOEXFOLIATION OF LENS CAPSULE) OD     GERD (gastroesophageal reflux disease)     Hyperlipidemia LDL goal <100     Advance Care Planning     Neuropathy     SHERRY (obstructive sleep apnea)-severe (AHI 35)     zEncounter for counseling     Anemia of chronic disease     Osteoporosis     Cornea guttata, ou     Conjunctival concretions     Stenosis, spinal, lumbar     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     MGD (meibomian gland dysfunction)     Prediabetes     Chronic bilateral low back pain without sciatica     Allergic state, subsequent encounter     Anxiety     DDD (degenerative disc disease), lumbar     Chronic right shoulder pain     Moderate episode of recurrent major depressive disorder (H)     Rheumatic mitral stenosis     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     History of bisphosphonate therapy     Morbid obesity (H)     Immunocompromised (H)     Thoracic spine pain     Other closed intra-articular fracture of distal end of left radius, initial encounter      Closed fracture of shaft of left ulna, unspecified fracture morphology, initial encounter     Closed fracture of olecranon process of right ulna with routine healing      Appointments:   Primary Care Provider follow up 12/2023 - TBD  Working with insurance to get bone stimulator  MRI: 06/12/23.   TCO: 06/05/23 Scheduled follow up as recommended in 6 weeks - date unknown - has in daughters phone.   05/11/23 - podiatry follow up recommended - patient declines.  Physical therapy for strengthening due to falls - patient declines to schedule at this time due to feeling overwhelmed with multiple other appointments.   Reports she has an eye exam scheduled at the end of the summer, date unknown, also has information in daughters phone.   States she has dentist appointment scheduled for July 20th at 10 am.     RNCC will send food shelf and hot meal resources to patient via [employer].     [place]: Bhumika Em: #[phone] [email or web address].   Added to care team       Current Behavioral Concerns: none noted.     Education Provided to patient: As above. CC role, goal(s), clinic after hours, medications  appointments, discussed/reviewed. Support provided.    Pain  Pain (GOAL):: No (Intermittent mid/low back radiates to right side)  Health Maintenance Reviewed: Due/Overdue   Health Maintenance Due   Topic Date Due     URINE DRUG SCREEN  Never done     EYE EXAM  08/18/2021      Clinical Pathway: None    Medication Management:  Medication review status: Medications reviewed and no changes reported per patient.           Functional Status:  Dependent ADLs:: Independent  Dependent IADLs:: Cleaning, Laundry  Bed or wheelchair confined:: No  Mobility Status: Independent w/Device  Fallen 2 or more times in the past year?: No  Any fall with injury in the past year?: Yes    Living Situation:  Current living arrangement:: I live alone  Type of residence:: Apartment    Lifestyle & Psychosocial Needs:    Social  Determinants of Health     Tobacco Use: Medium Risk (6/5/2023)    Patient History      Smoking Tobacco Use: Former      Smokeless Tobacco Use: Never      Passive Exposure: Not on file   Alcohol Use: Not At Risk (10/31/2019)    AUDIT-C      Frequency of Alcohol Consumption: Never      Average Number of Drinks: Not on file      Frequency of Binge Drinking: Not on file   Financial Resource Strain: Low Risk  (3/3/2023)    Overall Financial Resource Strain (CARDIA)      Difficulty of Paying Living Expenses: Not hard at all   Food Insecurity: No Food Insecurity (3/3/2023)    Hunger Vital Sign      Worried About Running Out of Food in the Last Year: Never true      Ran Out of Food in the Last Year: Never true   Transportation Needs: No Transportation Needs (3/3/2023)    PRAPARE - Transportation      Lack of Transportation (Medical): No      Lack of Transportation (Non-Medical): No   Physical Activity: Not on file   Stress: Not on file   Social Connections: Not on file   Intimate Partner Violence: Not on file   Depression: Not at risk (6/5/2023)    PHQ-2      PHQ-2 Score: 1   Housing Stability: Not on file     Diet:: Regular  Inadequate nutrition (GOAL):: No  Tube Feeding: No  Inadequate activity/exercise (GOAL):: No  Significant changes in sleep pattern (GOAL): No  Transportation means:: Regular car  Pentecostal or spiritual beliefs that impact treatment:: No  Mental health DX:: Yes  Mental health DX how managed:: Medication, Outpatient Counseling  Mental health management concern (GOAL):: No  Chemical Dependency Status: No Current Concerns  Chemical Dependency Management:  (NA)  Informal Support system:: Children      Resources and Interventions:  Current Resources:   Community Resources: County Programs, Housekeeping/Chore Agency, Lifeline,  Mental Health (SNAP)  Supplies Currently Used at Home: Incontinence Supplies  Equipment Currently Used at Home: walker, rolling, cane, straight, grab bar, tub/shower, shower chair,  wheelchair, power, raised toilet seat  Employment Status: retired  Advance Care Plan/Directive  Advanced Care Plans/Directives on file:: Yes  Type Advanced Care Plans/Directives: Advanced Directive - On File, POLST  Advanced Care Plan/Directive Status: Not Applicable     Care Plan:  Care Plan: Specialty Referral     Problem: Patient is in need of specialty care     Long-Range Goal: Establish consistent relationship with specialist(s) as needed     Start Date: 6/8/2023 Expected End Date: 12/29/2023    Note:     Barriers: diagnosis of multiple, chronic, complex medical conditions, provider availability - wait time to complete appointments, etc.   Strengths: motivated, engaged in care coordination.   Patient expressed understanding of goal: yes  Action steps to achieve this goal:  1. I will follow up with my providers as scheduled/recommended.   - TCO: 06/05/23 - follow up scheduled as recommended 6 weeks, date unknown  - MRI: 06/12/23  - Bone stimulator pending  - MTM Pharmacy: 06/26/23  - Mental Health: 06/27/23  - Dentist: 07/20/23  - Eye exam: Scheduled end of summer - date unknown  - Endocrinology: 08/16/23  - Lab: 08/22/23  - Rheumatology: 08/25/23  - Primary Care Provider 12/2023 TBD  - Dexa: 02/06/24  - Podiatry 05/11/23 - declines to follow up at this time.   - Physical Therapy: TBD  2. I will discuss, review, schedule and complete recommended overdue health maintenance with my Primary Care Provider.   3. I will contact my care team with questions, concerns, support needs. I will use the clinic as a resource and I understand I can contact my clinic with 24/7 after hours services available. Care Coordinator will remain available as needed.                        Care Plan: Financial Wellbeing     Problem: Patient expresses financial resource strain     Long-Range Goal: Create an action plan to increase financial stability     Start Date: 6/8/2023 Expected End Date: 12/29/2023    Note:     Barriers: diagnosis of  multiple, chronic, complex, medical conditions, limited income  Strengths: motivated, engaged in care coordination, receiving food stamps and has a county worker.   Patient expressed understanding of goal: Yes  Action steps to achieve this goal:  1. I will review the following resources:   Hot Meals:     [facility]                          [address], [place] [address]  Note: Please do NOT go to the building located at [address]. Loaves & Fishes meals are served at the corner of [address] and [address].     Meals Served: Monday - Thursday from 5:30 - 6:30 PM  Service Location: Meals served at the [facility] front doors (2 white double doors)    [employer] - [phone] [address] [facility] [place], [address]  2nd Thursday of each month - curbside available. 5:30-6:30 pm       [facility] - [phone]                        [address] [place] [address]  Wednesdays, 5:45-6:25 pm during school year only.        [facility] [employer] - [phone]        [address] [facility] [address]  Every 3rd and 4th Thursday, 5:00-6:00 pm.                    Curbside only due to COVID-19.  Those wishing a meal should enter the lower parking lot and follow directions. Meals are picked up by Door 1.       [facility] - [phone]     [address] [place], [place]   Drive Through Meals: Monday 5:30-6:30 pm       [facility] - [phone]  [address], [place] [address]  Monday thru Thursday, 5:30-6:30 pm. Curbside only due to COVID-19.     Food Pantries:     [facility] [phone] [address] [place], [place], [address]   Partners with local charities and offers the [address] [place] [address], formerly [employer] [place] Food Shelves        [facility] the [facility] - [phone] [address] [facility]  [place] [address] Hours: Tue 10:20 AM - 3:40 PM , Thu 10:20 AM - 3:40 PM  Fees: Free       The main service they offer is [employer] food shelf. They also run several federal government USDA supported programs in [facility]. This is run in partnership with [address] [place] [address] and other local social service agencies.        Demetria Beard [facility] - [phone]  [address] [place] [address]    Hours: Tue 11:30 AM - 12:30 PM  Fees: Self Pay         [facility] - [phone] [address] [place] [address]   Hours: Mon - Wed 10:00 AM - 3:00 PM , Tue 5:30 PM - 7:30 PM , Thu 5:30 PM - 7:30 PM , Thu - Fri 10:00 AM - 12:00 PM  Fees: Free         [facility]  [phone]   Serves families in [facility]. Programs can provide food, meals, and other non-financial support.        [facility] [phone]   [address] [address], [address]   This location operates a food shelf and other social service programs. Assistance offered is extensive, and includes personal hygiene supplies, household products, laundry and household . Or speak to a  and apply for other resources such as food stamps, [facility] welfare, or more.      A second location of the [facility] is located at [address] [place], [place], [address]. They offer many of the same programs and resources as indicated above.        [address] [place] [address] - [facility] Food Shelf - [phone] [address] [place] [address] Hours: Tue 10:00 PM - 3:00 PM Appt. Only, Thu 12:00 PM - 6:00 PM Appt. Only  Fees: Free         [facility]  - [phone] [address] [place] [address]  Hours: Sat 11:30 PM - 12:30 PM  Fees: Free          [phone]   [address] [place] [address], [address]   The non-profit offers Free Food for seniors and other income  qualified elderly and residents. The main resource they offer from this facility is the Nutrition Assistance for Seniors (NAPS) supplemental food program. Government commodities, vouchers, and other food assistance is provided       [facility] [employer] [facility] [phone] [address] , [place], [place], [address]        [facility] [employer] - [phone] [address] [place] [address]  Hours: Tue 1:00 PM - 3:30 PM  Fees: Free      [address] [facility] - [facility] - Coordinated Entry   [address] [place] [address]  Fees: Free [phone]   Hours: Mon - Thu 9:00 AM - 4:00 PM        [facility]   [address] [place] [address]  Fees: Free [phone]  Hours: Mon 4:00 PM - 6:00 PM Tue 11:00 AM - 1:00 PM , Wed 4:00 PM - 6:00 PM , Thu 10:30 AM - 12:30 PM, 2-3 PM        [facility] [address] [place] [address]  Fees: Free [phone]  Hours: Tue - Sat 10:00 AM - 6:00 PM        [facility] [employer] - [phone] Ext. 234   [address] [place], [place] [address]Qrrgt: Tue 10:00 AM - 12:00 PM  Fees: Free        Additional food pantries operate in [facility] [phone]   The centers may offer groceries, hot meals, and special seasonal programs such as summer snacks for students or free Jewels and Thanksgiving meals                          Patient/Caregiver understanding: Patient/caregiver verbalized understanding and denies any additional questions or concerns at this time. RNCC engaged in [employer] communications during encounter.      Outreach Frequency: monthly  Future Appointments              In 2 weeks Margaux Harley RPH [facility] Rob    In 2 weeks Rocío Arenas LICSW [facility], [facility]    In 2 months Annmarie Smith MD M [place], [place]    In 2 months  LAB M  [facility], RAMANA CL    In 2 months Nico Byers MD [facility]    In 8 months FKDX1 [facility]        Plan: RNCC will send clinic care coordination introduction letter, patient centered plan of care, and medication list to patient via [employer]. Patient/caregiver was provided with writers contact information and encouraged to call with questions, concerns, support needs. RNCC will remain available as needed. CHWCC will follow up with patient/caregiver again in 3-4 weeks. RNCC will review chart in 6 weeks.      Lucille Awad RN Care Coordinator  [facility] - [place], [place], [place]  Email: [email or web address]  Phone: [phone]

## 2023-06-08 NOTE — LETTER
[facility] CARE COORDINATION  [address]  [facility] [address]     June 8, 2023    Ginger Marshall  [address]   [place] [address]      Dear Ginger,    I am a  clinic care coordinator who works with Duy Leyva MD with the [facility]. I wanted to thank you for spending the time to talk with me.  Below is a description of clinic care coordination and how I can further assist you.       The clinic care coordination team is made up of a registered nurse, , financial resource worker and community health worker who understand the health care system. The goal of clinic care coordination is to help you manage your health and improve access to the health care system. Our team works alongside your provider to assist you in determining your health and social needs. We can help you obtain health care and community resources, providing you with necessary information and education. We can work with you through any barriers and develop a care plan that helps coordinate and strengthen the communication between you and your care team.  Our services are voluntary and are offered without charge to you personally.    Please feel free to contact me with any questions or concerns regarding care coordination and what we can offer.      We are focused on providing you with the highest-quality healthcare experience possible.    Sincerely,     Lucille Awad RN Care Coordinator  [facility] [place], [place], [place]  Email: [email or web address]  Phone: [phone]     Enclosed: I have enclosed a copy of the Patient Centered Plan of Care. This has helpful information and goals that we have talked about. Please keep this in an easy to access place to use as needed.

## 2023-06-20 ENCOUNTER — MYC MEDICAL ADVICE (OUTPATIENT)
Dept: FAMILY MEDICINE | Facility: CLINIC | Age: 83
End: 2023-06-20
Payer: COMMERCIAL

## 2023-06-20 ENCOUNTER — APPOINTMENT (OUTPATIENT)
Dept: MRI IMAGING | Facility: CLINIC | Age: 83
End: 2023-06-20
Attending: EMERGENCY MEDICINE
Payer: COMMERCIAL

## 2023-06-20 ENCOUNTER — NURSE TRIAGE (OUTPATIENT)
Dept: FAMILY MEDICINE | Facility: CLINIC | Age: 83
End: 2023-06-20
Payer: COMMERCIAL

## 2023-06-20 ENCOUNTER — HOSPITAL ENCOUNTER (EMERGENCY)
Facility: CLINIC | Age: 83
Discharge: HOME | End: 2023-06-20
Attending: EMERGENCY MEDICINE | Admitting: EMERGENCY MEDICINE
Payer: COMMERCIAL

## 2023-06-20 VITALS
HEART RATE: 69 BPM | OXYGEN SATURATION: 96 % | SYSTOLIC BLOOD PRESSURE: 143 MMHG | TEMPERATURE: 98 F | RESPIRATION RATE: 18 BRPM | DIASTOLIC BLOOD PRESSURE: 78 MMHG

## 2023-06-20 DIAGNOSIS — R42 VERTIGO: ICD-10-CM

## 2023-06-20 LAB
ALBUMIN SERPL BCG-MCNC: 4.6 G/DL (ref 3.5–5.2)
ALBUMIN UR-MCNC: NEGATIVE MG/DL
ALP SERPL-CCNC: 50 U/L (ref 35–104)
ALT SERPL W P-5'-P-CCNC: 21 U/L (ref 0–50)
ANION GAP SERPL CALCULATED.3IONS-SCNC: 13 MMOL/L (ref 7–15)
APPEARANCE UR: CLEAR
AST SERPL W P-5'-P-CCNC: 29 U/L (ref 0–45)
ATRIAL RATE - MUSE: 64 BPM
BASOPHILS # BLD AUTO: 0 10E3/UL (ref 0–0.2)
BASOPHILS NFR BLD AUTO: 0 %
BILIRUB SERPL-MCNC: 0.3 MG/DL
BILIRUB UR QL STRIP: NEGATIVE
BUN SERPL-MCNC: 9.6 MG/DL (ref 8–23)
CALCIUM SERPL-MCNC: 9.8 MG/DL (ref 8.8–10.2)
CHLORIDE SERPL-SCNC: 105 MMOL/L (ref 98–107)
COLOR UR AUTO: NORMAL
CREAT SERPL-MCNC: 0.6 MG/DL (ref 0.51–0.95)
DEPRECATED HCO3 PLAS-SCNC: 24 MMOL/L (ref 22–29)
DIASTOLIC BLOOD PRESSURE - MUSE: NORMAL MMHG
EOSINOPHIL # BLD AUTO: 0.2 10E3/UL (ref 0–0.7)
EOSINOPHIL NFR BLD AUTO: 3 %
ERYTHROCYTE [DISTWIDTH] IN BLOOD BY AUTOMATED COUNT: 17.7 % (ref 10–15)
GFR SERPL CREATININE-BSD FRML MDRD: 90 ML/MIN/1.73M2
GLUCOSE SERPL-MCNC: 123 MG/DL (ref 70–99)
GLUCOSE UR STRIP-MCNC: NEGATIVE MG/DL
HCT VFR BLD AUTO: 36.4 % (ref 35–47)
HGB BLD-MCNC: 11 G/DL (ref 11.7–15.7)
HGB UR QL STRIP: NEGATIVE
IMM GRANULOCYTES # BLD: 0 10E3/UL
IMM GRANULOCYTES NFR BLD: 0 %
INTERPRETATION ECG - MUSE: NORMAL
KETONES UR STRIP-MCNC: NEGATIVE MG/DL
LACTATE SERPL-SCNC: 1.1 MMOL/L (ref 0.7–2)
LEUKOCYTE ESTERASE UR QL STRIP: NEGATIVE
LYMPHOCYTES # BLD AUTO: 1.4 10E3/UL (ref 0.8–5.3)
LYMPHOCYTES NFR BLD AUTO: 28 %
MAGNESIUM SERPL-MCNC: 2.3 MG/DL (ref 1.7–2.3)
MCH RBC QN AUTO: 27.3 PG (ref 26.5–33)
MCHC RBC AUTO-ENTMCNC: 30.2 G/DL (ref 31.5–36.5)
MCV RBC AUTO: 90 FL (ref 78–100)
MONOCYTES # BLD AUTO: 0.5 10E3/UL (ref 0–1.3)
MONOCYTES NFR BLD AUTO: 11 %
NEUTROPHILS # BLD AUTO: 2.7 10E3/UL (ref 1.6–8.3)
NEUTROPHILS NFR BLD AUTO: 58 %
NITRATE UR QL: NEGATIVE
NRBC # BLD AUTO: 0 10E3/UL
NRBC BLD AUTO-RTO: 0 /100
P AXIS - MUSE: NORMAL DEGREES
PH UR STRIP: 7 [PH] (ref 5–7)
PLATELET # BLD AUTO: 397 10E3/UL (ref 150–450)
POTASSIUM SERPL-SCNC: 3.5 MMOL/L (ref 3.4–5.3)
PR INTERVAL - MUSE: 174 MS
PROT SERPL-MCNC: 8.1 G/DL (ref 6.4–8.3)
QRS DURATION - MUSE: 88 MS
QT - MUSE: 452 MS
QTC - MUSE: 466 MS
R AXIS - MUSE: 20 DEGREES
RBC # BLD AUTO: 4.03 10E6/UL (ref 3.8–5.2)
RBC URINE: <1 /HPF
SODIUM SERPL-SCNC: 142 MMOL/L (ref 136–145)
SP GR UR STRIP: 1.01 (ref 1–1.03)
SQUAMOUS EPITHELIAL: <1 /HPF
SYSTOLIC BLOOD PRESSURE - MUSE: NORMAL MMHG
T AXIS - MUSE: 4 DEGREES
TROPONIN T SERPL HS-MCNC: 13 NG/L
TROPONIN T SERPL HS-MCNC: 14 NG/L
TSH SERPL DL<=0.005 MIU/L-ACNC: 1.29 UIU/ML (ref 0.3–4.2)
UROBILINOGEN UR STRIP-MCNC: NORMAL MG/DL
VENTRICULAR RATE- MUSE: 64 BPM
WBC # BLD AUTO: 4.8 10E3/UL (ref 4–11)
WBC URINE: <1 /HPF

## 2023-06-20 PROCEDURE — 36415 COLL VENOUS BLD VENIPUNCTURE: CPT | Performed by: EMERGENCY MEDICINE

## 2023-06-20 PROCEDURE — 96374 THER/PROPH/DIAG INJ IV PUSH: CPT

## 2023-06-20 PROCEDURE — 81001 URINALYSIS AUTO W/SCOPE: CPT | Performed by: EMERGENCY MEDICINE

## 2023-06-20 PROCEDURE — 70551 MRI BRAIN STEM W/O DYE: CPT

## 2023-06-20 PROCEDURE — 96361 HYDRATE IV INFUSION ADD-ON: CPT

## 2023-06-20 PROCEDURE — 80053 COMPREHEN METABOLIC PANEL: CPT | Performed by: EMERGENCY MEDICINE

## 2023-06-20 PROCEDURE — 250N000013 HC RX MED GY IP 250 OP 250 PS 637: Performed by: EMERGENCY MEDICINE

## 2023-06-20 PROCEDURE — 83605 ASSAY OF LACTIC ACID: CPT | Performed by: EMERGENCY MEDICINE

## 2023-06-20 PROCEDURE — 84443 ASSAY THYROID STIM HORMONE: CPT | Performed by: EMERGENCY MEDICINE

## 2023-06-20 PROCEDURE — 250N000011 HC RX IP 250 OP 636: Performed by: EMERGENCY MEDICINE

## 2023-06-20 PROCEDURE — 83735 ASSAY OF MAGNESIUM: CPT | Performed by: EMERGENCY MEDICINE

## 2023-06-20 PROCEDURE — 99285 EMERGENCY DEPT VISIT HI MDM: CPT | Mod: 25

## 2023-06-20 PROCEDURE — 84484 ASSAY OF TROPONIN QUANT: CPT | Mod: 91 | Performed by: EMERGENCY MEDICINE

## 2023-06-20 PROCEDURE — 93005 ELECTROCARDIOGRAM TRACING: CPT

## 2023-06-20 PROCEDURE — 85025 COMPLETE CBC W/AUTO DIFF WBC: CPT | Performed by: EMERGENCY MEDICINE

## 2023-06-20 PROCEDURE — 258N000003 HC RX IP 258 OP 636: Performed by: EMERGENCY MEDICINE

## 2023-06-20 RX ORDER — MECLIZINE HYDROCHLORIDE 25 MG/1
25 TABLET ORAL 3 TIMES DAILY PRN
Qty: 20 TABLET | Refills: 0 | Status: SHIPPED | OUTPATIENT
Start: 2023-06-20 | End: 2023-12-21

## 2023-06-20 RX ORDER — LORAZEPAM 2 MG/ML
0.5 INJECTION INTRAMUSCULAR
Status: COMPLETED | OUTPATIENT
Start: 2023-06-20 | End: 2023-06-20

## 2023-06-20 RX ORDER — MECLIZINE HYDROCHLORIDE 25 MG/1
25 TABLET ORAL ONCE
Status: COMPLETED | OUTPATIENT
Start: 2023-06-20 | End: 2023-06-20

## 2023-06-20 RX ADMIN — LORAZEPAM 0.5 MG: 2 INJECTION INTRAMUSCULAR; INTRAVENOUS at 18:13

## 2023-06-20 RX ADMIN — SODIUM CHLORIDE 1000 ML: 9 INJECTION, SOLUTION INTRAVENOUS at 13:58

## 2023-06-20 RX ADMIN — MECLIZINE HYDROCHLORIDE 25 MG: 25 TABLET ORAL at 13:58

## 2023-06-20 ASSESSMENT — ACTIVITIES OF DAILY LIVING (ADL)
ADLS_ACUITY_SCORE: 35

## 2023-06-20 NOTE — ED TRIAGE NOTES
Pt arrives ambulatory with daughter for dizziness, headache, nausea, and weakness that began yesterday. Pt able to communicate appropriately and has equal  strength. Dizziness is reported as a room spinning when her eyes are open.

## 2023-06-20 NOTE — ED PROVIDER NOTES
"  History     Chief Complaint:  Dizziness and Nausea    The history is provided by the patient.      Ginger Marshall is a 83 year old female with dizziness and nausea. The patient reports that yesterday afternoon, while sitting in her chair, she developed ambulatory dizziness with associated headache, photophobia, and nausea. She describes the dizziness as room-spinning as opposed to light-headedness, and says that her headache is exacerbated by standing up, and alleviated by laying down. The headache extends from the neck, up into the forehead. The patient presents with her daughter, who remembers the patient mentioning on the phone that she was \"having difficulty processing things.\" None of this has ever happened before. Patient denies any numbness, weakness, visual disturbances, vomiting, fevers, chills, diaphoresis, shortness of breath, frequency, dysuria, hematuria, or abdominal pain. No anticoagulants.     Independent Historian:   None - Patient Only    Review of External Notes:   None    Medications:    Reclast  Zinc gluconate  Rinvoq  Carafate  Protonix  Anaprox  Rasuvo  Claritin  Wellcovorin  Mucinex  Neurontin  Pepcid  Pristiq  Calcium citrate  Buspar  Lipitor  Albuterol    Past Medical History:    B12 deficiency  Sleep apnea   Rheumatoid arthritis  Rheumatic mitral stenosis  Pelvic fracture  Multinodular goiter  Migraine  Herniated nucleus pulposus, L3-4  Gastroenteritis  Ex-smoker  COPD  Closed fracture of multiple ribs   GERD  Hyperlipidemia  Neuropathy  SHERRY  Osteoporosis  Chronic bilateral lower back pain  Anxiety  DDD  Pulmonary nodule   Cornea guttata  Meibomian gland dysfunction  Obesity     Past Surgical History:     section   Disc surgery of back  Lumpectomy breast   Cataract extraction IOL  Colonoscopy   Right finger surgery  Left foot surgery  Right hand surgery  Esoph/gas reflux test with nasal imped  Lumbar epidural steroid injection  Translaminar epidural lumbar inj incl imaging "   Lumbar fusion  ORIF right elbow  ORIF left wrist   Optical tracking system fusion posterior spine lumbar left foot surgery  R MCP surgery  Right knee total replacement   Right total hip arthroplasty  Left total knee arthroplasty  Right total knee arthroplasty    Physical Exam     Patient Vitals for the past 24 hrs:   BP Temp Temp src Pulse Resp SpO2   06/20/23 1730 (!) 143/78 -- -- 69 -- 96 %   06/20/23 1700 (!) 140/69 -- -- 67 -- 94 %   06/20/23 1630 (!) 141/76 -- -- 69 -- 96 %   06/20/23 1609 (!) 147/79 -- -- 71 18 94 %   06/20/23 1405 (!) 157/81 -- -- -- 18 97 %   06/20/23 1400 (!) 157/81 -- -- 62 -- 96 %   06/20/23 1330 (!) 153/75 -- -- 62 -- 99 %   06/20/23 1310 (!) 158/100 98  F (36.7  C) Temporal 68 20 98 %     Physical Exam  General: Laying on the ED bed, no distress  HEENT: Normocephalic, atraumatic  Cardiac: 2+ R radial, regular rate and rhythm  Pulm: Breathing comfortably, no accessory muscle usage, no conversational dyspnea, and lungs clear bilaterally  GI: Abdomen soft, nontender, no rigidity or guarding  MSK: No deformities  Skin: Warm and dry  Neuro: Moves all extremities, no nystagmus with lateral gaze, PERRL  Psych: Normal mood and affect     Emergency Department Course   ECG  ECG taken at 1323, ECG read at 1330  Normal sinus rhythm with sinus arrhythmia.    Inferolateral TWI resolved as compared to prior, dated 2/11/23.  Rate 64 bpm. AL interval 174 ms. QRS duration 88 ms. QT/QTc 452/466 ms. P-R-T axes * 20 4.     Imaging:  MR Brain w/o Contrast   Final Result   IMPRESSION:   1.  No acute intracranial process.   2.  Generalized brain atrophy and presumed microvascular ischemic changes as detailed above.         Report per radiology    Laboratory:  Labs Ordered and Resulted from Time of ED Arrival to Time of ED Departure   COMPREHENSIVE METABOLIC PANEL - Abnormal       Result Value    Sodium 142      Potassium 3.5      Chloride 105      Carbon Dioxide (CO2) 24      Anion Gap 13      Urea Nitrogen  9.6      Creatinine 0.60      Calcium 9.8      Glucose 123 (*)     Alkaline Phosphatase 50      AST 29      ALT 21      Protein Total 8.1      Albumin 4.6      Bilirubin Total 0.3      GFR Estimate 90     CBC WITH PLATELETS AND DIFFERENTIAL - Abnormal    WBC Count 4.8      RBC Count 4.03      Hemoglobin 11.0 (*)     Hematocrit 36.4      MCV 90      MCH 27.3      MCHC 30.2 (*)     RDW 17.7 (*)     Platelet Count 397      % Neutrophils 58      % Lymphocytes 28      % Monocytes 11      % Eosinophils 3      % Basophils 0      % Immature Granulocytes 0      NRBCs per 100 WBC 0      Absolute Neutrophils 2.7      Absolute Lymphocytes 1.4      Absolute Monocytes 0.5      Absolute Eosinophils 0.2      Absolute Basophils 0.0      Absolute Immature Granulocytes 0.0      Absolute NRBCs 0.0     TROPONIN T, HIGH SENSITIVITY - Normal    Troponin T, High Sensitivity 14     MAGNESIUM - Normal    Magnesium 2.3     LACTIC ACID WHOLE BLOOD - Normal    Lactic Acid 1.1     ROUTINE UA WITH MICROSCOPIC REFLEX TO CULTURE - Normal    Color Urine Straw      Appearance Urine Clear      Glucose Urine Negative      Bilirubin Urine Negative      Ketones Urine Negative      Specific Gravity Urine 1.007      Blood Urine Negative      pH Urine 7.0      Protein Albumin Urine Negative      Urobilinogen Urine Normal      Nitrite Urine Negative      Leukocyte Esterase Urine Negative      RBC Urine <1      WBC Urine <1      Squamous Epithelials Urine <1     TSH WITH FREE T4 REFLEX - Normal    TSH 1.29     TROPONIN T, HIGH SENSITIVITY - Normal    Troponin T, High Sensitivity 13       Emergency Department Course & Assessments:       Interventions:  Medications   LORazepam (ATIVAN) injection 0.5 mg (0.5 mg Intravenous $Given 6/20/23 1813)   meclizine (ANTIVERT) tablet 25 mg (25 mg Oral $Given 6/20/23 1358)   0.9% sodium chloride BOLUS (0 mLs Intravenous Stopped 6/20/23 1626)     Assessments:  1348 I obtained history and examined the patient as noted  above.  1911 I rechecked the patient and explained findings. Prepared for discharge.      Independent Interpretation (X-rays, CTs, rhythm strip):  None    Consultations/Discussion of Management or Tests:  None        Social Determinants of Health affecting care:   None    Disposition:  The patient was discharged to home.     Impression & Plan      Medical Decision Makin-year-old female presents with symptoms as above.  Unclear whether she is lightheaded or vertiginous based on history, however favoring vertiginous symptoms at this time.  She is at her neurologic baseline.  MRI shows no evidence of ischemia.  She was given Antivert with good effect on her symptoms.  Other lab work shows no acute findings.  EKG is nonischemic.  Overall picture is consistent with peripheral vertigo and the patient is much improved on reevaluation. Plan is for discharge home with prescription for meclizine and primary care follow-up for further evaluation and treatment of her symptoms.    Diagnosis:    ICD-10-CM    1. Vertigo  R42         Discharge Medications:  Discharge Medication List as of 2023  7:16 PM      START taking these medications    Details   meclizine (ANTIVERT) 25 MG tablet Take 1 tablet (25 mg) by mouth 3 times daily as needed for dizziness, Disp-20 tablet, R-0, E-Prescribe            Scribe Disclosure:  I, Deion Elena, am serving as a scribe at 1:41 PM on 2023 to document services personally performed by Miguel Gates MD based on my observations and the provider's statements to me.   2023   Miguel Gates MD King, Colin, MD  23

## 2023-06-20 NOTE — TELEPHONE ENCOUNTER
Pt calling w/ daughterfany -  copy and pasted below.       Hi, I am reaching out on behalf of my mom. She is not feeling well --light headed, confusion, weakness, unable to sit up without feeling dizzy, woke up with Headache. Currently,  trying liquid IV to help with electolytes. Wondering if we need to go to urgent care or wait?     Pt reports symptoms started yesterday.   Pt is unable to stand/walk.   Pt is feeling weakness all over  Pt feels confusion/disoriented w/ movement  Denies problems w/ speech  Denies one sided weakness  Denies loss of bowel/urine incontinence  Denies SOB/trouble breathing    Adv if pt is unable to walk/get dressed and confusion should call 911. Daughter is w/ pt and she would like to see how she does and may drive her to ED.     Ana MATTHEWS RN

## 2023-06-21 ENCOUNTER — PATIENT OUTREACH (OUTPATIENT)
Dept: CARE COORDINATION | Facility: CLINIC | Age: 83
End: 2023-06-21
Payer: COMMERCIAL

## 2023-06-21 NOTE — PROGRESS NOTES
Clinic Care Coordination Contact    Follow Up Progress Note      Assessment:   Patient states her daughter is at work but has a neighbor to help if/as needed.   Reports her oxygen saturation reading is 95%, doesn't have a blood pressure cuff at home. ER didn't check her ears but did a brain scan that was normal.   Continues to have a headache, feel nauseous when she opens her eyes and dizzy. Took some meclizine and Tylenol with some gingerale about an hour prior to RNCC call. States she is laying back in bed currently.   She is using her CPAP and walker for safety.   RNCC assisted patient to schedule provider follow up visit.   Patient not available for provider follow up visit today.   Patient verbalized understanding of using clinic as a resource with 24/7 triage available after hours, urgent care and return to ER if emergency.     Care Gaps:    Health Maintenance Due   Topic Date Due     URINE DRUG SCREEN  Never done     EYE EXAM  08/18/2021     Care Gap Goal set: Yes    Care Plans  Care Plan: Specialty Referral     Problem: Patient is in need of specialty care     Long-Range Goal: Establish consistent relationship with specialist(s) as needed     Start Date: 6/8/2023 Expected End Date: 12/29/2023    This Visit's Progress: 20%    Note:     Barriers: diagnosis of multiple, chronic, complex medical conditions, provider availability - wait time to complete appointments, etc.   Strengths: motivated, engaged in care coordination.   Patient expressed understanding of goal: yes  Action steps to achieve this goal:  1. I will follow up with my providers as scheduled/recommended.   - TCO: 06/05/23 - follow up scheduled as recommended 6 weeks, date unknown  - MRI: 06/12/23  - Bone stimulator pending  - [facility]: 06/26/23  - Mental Health: 06/27/23  - Dentist: 07/20/23  - Eye exam: Scheduled end of summer - date unknown  - Endocrinology: 08/16/23  - Lab: 08/22/23  - Rheumatology: 08/25/23  - Primary Care Provider 12/2023  TBD  - Dexa: 02/06/24  - Podiatry 05/11/23 - declines to follow up at this time.   - Physical Therapy: TBD  2. I will discuss, review, schedule and complete recommended overdue health maintenance with my Primary Care Provider.   3. I will contact my care team with questions, concerns, support needs. I will use the clinic as a resource and I understand I can contact my clinic with 24/7 after hours services available. Care Coordinator will remain available as needed.                        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   Patient expressed understanding of goal: Yes  Action steps to achieve this goal:  1. I will review the following resources:   Hot Meals:     Easter by the [facility]                          [address], [place] [address]  Note: Please do NOT go to the building located at [address]. Loaves & Fishes meals are served at the corner of [address].     Meals Served: Monday - Thursday from 5:30 - 6:30 PM  Service Location: Meals served at the [facility] front doors (2 white double doors)    [employer] - [phone]            [address] [facility]. [facility], [address]  2nd Thursday of each month - curbside available. 5:30-6:30 pm       [facility] - [phone]                        [address] [place] [address]  Wednesdays, 5:45-6:25 pm during school year only.        [facility] - [phone]        [address] [facility] [address]  Every 3rd and 4th Thursday, 5:00-6:00 pm.                    Curbside only due to  COVID-19.  Those wishing a meal should enter the lower parking lot and follow directions. Meals are picked up by Door 1.       Chirag Donovan [facility] - [phone]     [address] [facility], [place], [place]   Drive Through Meals: Monday 5:30-6:30 pm       [facility] - [phone]  [address], [place] [address]  Monday thru Thursday, 5:30-6:30 pm. Curbside only due to COVID-19.     Food Pantries:     Critical access hospital [phone] [address] [place], [address]   Partners with local charities and offers the [address] [place] [address], formerly [employer] [place] Food Shelves        [facility] - [phone] [address] [place] [address] Hours: Tue 10:20 AM - 3:40 PM , Thu 10:20 AM - 3:40 PM  Fees: Free       The main service they offer is [employer] food shelf. They also run several federal government USDA supported programs in [facility]. This is run in partnership with [address] [place] [address] and other local social service agencies.        Demetria Chirag [facility] - [phone]  [address] [place] [address]    Hours: Tue 11:30 AM - 12:30 PM  Fees: Self Pay         [facility] - [phone] [address] [place] [address]   Hours: Mon - Wed 10:00 AM - 3:00 PM , Tue 5:30 PM - 7:30 PM , Thu 5:30 PM - 7:30 PM , Thu - Fri 10:00 AM - 12:00 PM  Fees: Free         [facility]  [phone]   Serves families in [facility]. Programs can provide food, meals, and other non-financial support.        [facility] [phone] I   [address] [place], [place], [address]   This location operates a food shelf and other social service programs. Assistance offered is extensive, and includes personal hygiene supplies, household products, laundry and household . Or speak to a   and apply for other resources such as food stamps, TAN welfare, or more.      A second location of the [facility] is located at [address] [place], [place], [address]. They offer many of the same programs and resources as indicated above.        [address] [facility] Food Shelf - [phone] [address] [place] [address] Hours: Tue 10:00 PM - 3:00 PM Appt. Only, Thu 12:00 PM - 6:00 PM Appt. Only  Fees: Free         [facility]  - [phone] [address] [place] [address]  Hours: Sat 11:30 PM - 12:30 PM  Fees: Free         [facility] [phone]   [address] [place], [place], [address]   The non-profit offers Free Food for seniors and other income qualified elderly and residents. The main resource they offer from this facility is the Nutrition Assistance for Seniors (NAPS) supplemental food program. Government commodities, vouchers, and other food assistance is provided       [facility] [phone] [address] [place], [place], [address]        [facility] - [phone] [address] [place] [address]  Hours: Tue 1:00 PM - 3:30 PM  Fees: Free      [address] [place] [address] - Coordinated Entry   [address] [place] [address]  Fees: Free [phone]   Hours: Mon - Thu 9:00 AM - 4:00 PM        [facility]   [address]  [place] [address]  Fees: Free [phone]  Hours: Mon 4:00 PM - 6:00 PM Tue 11:00 AM - 1:00 PM , Wed 4:00 PM - 6:00 PM , Thu 10:30 AM - 12:30 PM, 2-3 PM        [facility] [address] [place] [address]  Fees: Free [phone]  Hours: Tue - Sat 10:00 AM - 6:00 PM        [facility] - [phone] Ext. 234   [address] [place], [place] [address]Dfdxx: Tue 10:00 AM - 12:00 PM  Fees: Free        Additional food pantries  operate in [facility] [phone]   The centers may offer groceries, hot meals, and special seasonal programs such as summer snacks for students or free Jewels and Thanksgiving meals                          Intervention/Education provided during outreach: As above. CC role, goal(s), clinic after hours, appointments, discussed/reviewed. Support provided.     Plan:   Patient/caregiver will call RNCC with questions, concerns, support needs. RNCC will be available as needed.    [facility] Care Coordinator will follow up in 1 month. RNCC will review chart in 6 weeks.     Lucille Awad RN Care Coordinator  [facility] [place]  Email: [email or web address]  Phone: [phone]

## 2023-06-23 ENCOUNTER — TELEPHONE (OUTPATIENT)
Dept: RHEUMATOLOGY | Facility: CLINIC | Age: 83
End: 2023-06-23

## 2023-06-23 ENCOUNTER — OFFICE VISIT (OUTPATIENT)
Dept: FAMILY MEDICINE | Facility: CLINIC | Age: 83
End: 2023-06-23
Payer: COMMERCIAL

## 2023-06-23 VITALS
HEIGHT: 61 IN | TEMPERATURE: 98.1 F | WEIGHT: 202 LBS | RESPIRATION RATE: 18 BRPM | SYSTOLIC BLOOD PRESSURE: 111 MMHG | HEART RATE: 88 BPM | OXYGEN SATURATION: 95 % | DIASTOLIC BLOOD PRESSURE: 68 MMHG | BODY MASS INDEX: 38.14 KG/M2

## 2023-06-23 DIAGNOSIS — R42 VERTIGO: Primary | ICD-10-CM

## 2023-06-23 PROCEDURE — 99213 OFFICE O/P EST LOW 20 MIN: CPT | Performed by: PHYSICIAN ASSISTANT

## 2023-06-23 ASSESSMENT — PAIN SCALES - GENERAL: PAINLEVEL: NO PAIN (0)

## 2023-06-23 NOTE — PROGRESS NOTES
"  Assessment & Plan     Vertigo  Symptoms improving over past few days. No red flag symptoms. Plan to continue meclizine PRN, vestibular therapy. Follow-up if worsening or red flag symptoms we discussed.  - Physical Therapy Referral; Future      MARYSOL Styles [facility] RAMANA Miles is a 83 year old, presenting for the following health issues:  ER F/U        6/23/2023    12:43 PM   Additional Questions   Roomed by stacey vela [facility]         6/23/2023    12:43 PM   Patient Reported Additional Medications   Patient reports taking the following new medications none     HPI     ED/UC Followup:    Facility:  [facility]  Date of visit: 6/20/2023  Reason for visit: Vertigo  Current Status: feeling better. Notes periods of lightheadedness    ER 3 days ago for dizziness. MRI showed no evidence of ischemia. She was given Antivert with good effect on her symptoms. Other lab work showed no acute findings. EKG was nonischemic. Overall picture was consistent with peripheral vertigo and she was much improved on reevaluation so discharged home with meclizine.    Since then, she is feeling much better. Still having some dizziness with head movements, meclizine has helped. No dizziness/lightheadedness with position changes. No associated chest pain, shortness of breath, diaphoresis, palpitations, headache, vision changes, numbness/tingling, focal weakness, speech changes, confusion. No head trauma. No syncope or presyncope. No recent illness, congestion, fever, ear pain. No ringing in ears, hearing changes. Has been eating and drinking normally.    Review of Systems   Constitutional, HEENT, cardiovascular, pulmonary, gi and gu systems are negative, except as otherwise noted.      Objective    /68 (BP Location: Right arm, Patient Position: Sitting, Cuff Size: Adult Large)   Pulse 88   Temp 98.1  F (36.7  C) (Tympanic)   Resp 18   Ht 1.549 m (5' 1\")   Wt 91.6 kg (202 lb)   SpO2 95%   " BMI 38.17 kg/m    Body mass index is 38.17 kg/m .  Physical Exam   GENERAL: healthy, alert and no distress  EYES: Eyes grossly normal to inspection, PERRL and conjunctivae and sclerae normal  HENT: ear canals and TM's normal, nose and mouth without ulcers or lesions  NECK: no adenopathy  RESP: lungs clear to auscultation - no rales, rhonchi or wheezes  CV: regular rate and rhythm, normal S1 S2, no S3 or S4, no murmur, click or rub  ABDOMEN: soft, nontender, no hepatosplenomegaly, no masses and bowel sounds normal  NEURO: Normal strength and tone, mentation intact and speech normal  PSYCH: mentation appears normal, affect normal/bright

## 2023-06-26 ENCOUNTER — VIRTUAL VISIT (OUTPATIENT)
Dept: PHARMACY | Facility: CLINIC | Age: 83
End: 2023-06-26
Payer: COMMERCIAL

## 2023-06-26 DIAGNOSIS — G62.9 NEUROPATHY: ICD-10-CM

## 2023-06-26 DIAGNOSIS — D63.8 ANEMIA OF CHRONIC DISEASE: ICD-10-CM

## 2023-06-26 DIAGNOSIS — S22.31XD CLOSED FRACTURE OF ONE RIB OF RIGHT SIDE WITH ROUTINE HEALING, SUBSEQUENT ENCOUNTER: ICD-10-CM

## 2023-06-26 DIAGNOSIS — K21.9 GASTROESOPHAGEAL REFLUX DISEASE, UNSPECIFIED WHETHER ESOPHAGITIS PRESENT: Primary | Chronic | ICD-10-CM

## 2023-06-26 PROCEDURE — 99207 PR NO CHARGE LOS: CPT | Mod: VID | Performed by: PHARMACIST

## 2023-06-26 RX ORDER — FAMOTIDINE 20 MG/1
20 TABLET, FILM COATED ORAL 2 TIMES DAILY
Qty: 60 TABLET | Refills: 1 | Status: SHIPPED | OUTPATIENT
Start: 2023-06-26 | End: 2023-07-11

## 2023-06-26 RX ORDER — FERROUS GLUCONATE 324(37.5)
1 TABLET ORAL DAILY
COMMUNITY

## 2023-06-26 RX ORDER — GABAPENTIN 300 MG/1
300 CAPSULE ORAL 2 TIMES DAILY
Qty: 60 CAPSULE | Refills: 0 | Status: SHIPPED | OUTPATIENT
Start: 2023-06-26 | End: 2023-07-31

## 2023-06-26 NOTE — PATIENT INSTRUCTIONS
"Recommendations from today's MTM visit:                                                         1. Its okay to take gabapentin without food. Try to just take one at night since feet/leg pain is worse at that time and if you need one during the day you could use then as needed. I did send an updated prescription for this,    2. Stop the pantoprazole in the morning and start taking famotidine 20 mg TWICE daily.    Follow-up: Return in 5 weeks (on 7/31/2023) for MTM video visit with Alise Harley.    It was great speaking with you today.  I value your experience and would be very thankful for your time in providing feedback in our clinic survey. In the next few days, you may receive an email or text message from [employer] with a link to a survey related to your  clinical pharmacist.\"     To schedule another MTM appointment, please call the clinic directly or you may call the MTM scheduling line at [phone] or toll-free at [phone].     My Clinical Pharmacist's contact information:                                                      Please feel free to contact me with any questions or concerns you have.      Alise Harley, PharmD  Medication Therapy Management Pharmacist    "

## 2023-06-26 NOTE — PROGRESS NOTES
Medication Therapy Management (MTM) Encounter    ASSESSMENT:                            Medication Adherence/Access: No issues identified    Peripheral neuropathy (feet and lower legs): Educated her that she can take gabapentin without food. No medication changes today. Sent updated prescription for current dose today.    GERD: Improved. Recommend trying to transition off PPI and change famotidine to twice daily, see plan.    Low iron:  Recommend rechecking iron in a few months to see if supplementation is effective. Increasing iron could also improve peripheral neuropathy.     PLAN:                            1. Its okay to take gabapentin without food. Try to just take one at night since feet/leg pain is worse at that time and if you need one during the day you could use then as needed. I did send an updated prescription for this,    2. Stop the pantoprazole in the morning and start taking famotidine 20 mg TWICE daily.    Follow-up: Return in 5 weeks (on 7/31/2023) for MTM video visit with Alise Harley.    SUBJECTIVE/OBJECTIVE:                          Ginger Marshall is a 83 year old female contacted via secure video for a follow-up visit.  Today's visit is a follow-up MTM visit from 5/22/23.     Reason for visit: follow-up GERD.    Allergies/ADRs: Reviewed in chart  Past Medical History: Reviewed in chart  Tobacco: She reports that she quit smoking about 24 years ago. Her smoking use included cigarettes. She has a 41.00 pack-year smoking history. She has never used smokeless tobacco.  Alcohol: glass of wine once in a while    Medication Adherence/Access: Patient uses pill box(es).  Patient takes medications 2 time(s) per day.   Per patient, misses medication 0 times per week.   Medication barriers: none.   The patient fills medications at [place]: YES.    Peripheral neuropathy (feet and lower legs)   acetaminophen 1300 mg once daily (middle of day)  naproxen 440 mg daily but only uses a couple times a month at  most  Gabapentin 300 mg twice daily but she has not been using the gabapentin at all because someone told her she needs to take with food and she doesn't want to eat before bed.     Patent reports no side effects. She has had more symptoms since being off the gabapentin.    GERD:   Protonix (pantoprazole) 20 mg once daily (decreased to once daily at last MTM visit)  Famotidine 20 mg at night (started at last MTM visit)  sucralfate 1 gm four times daily as needed (have only used once since our last visit)    The patient not have history of GI bleed. She used to take Excedrin a lot and ended up with ulcers. That is why the PPI was started.     Since last visit her symptoms have been well controlled    Low iron:    Ferrous gluconate 234 mg once daily - started 6/24/23 on her own    Ferritin   Date Value Ref Range Status   06/05/2023 22 11 - 328 ng/mL Final   11/23/2018 47 8 - 252 ng/mL Final     Iron   Date Value Ref Range Status   06/05/2023 28 (L) 37 - 145 ug/dL Final   11/23/2018 150 35 - 180 ug/dL Final     Iron Binding Cap   Date Value Ref Range Status   11/23/2018 351 240 - 430 ug/dL Final     Iron Binding Capacity   Date Value Ref Range Status   06/05/2023 380 240 - 430 ug/dL Final       Today's Vitals: There were no vitals taken for this visit.  ----------------    I spent 20 minutes with this patient today. All changes were made via collaborative practice agreement with Duy Leyva MD. A copy of the visit note was provided to the patient's provider(s).    A summary of these recommendations was sent via [employer].    Alise Harley, PharmD  Medication Therapy Management Pharmacist    Telemedicine Visit Details  Type of service:  Video Conference via [employer]  Start Time: 9:40 AM  End Time: 10:00 AM     Medication Therapy Recommendations  GERD (gastroesophageal reflux disease)    Current Medication: famotidine (PEPCID) 20 MG tablet (Discontinued)   Rationale: Dose too low - Dosage too low - Effectiveness    Recommendation: Increase Frequency - famotidine 20 MG tablet   Status: Accepted per CPA   Note: stopping pantoprazole and increasing famotidine         Neuropathy    Current Medication: gabapentin (NEURONTIN) 300 MG capsule   Rationale: Does not understand instructions - Adherence - Adherence   Recommendation: Provide Education   Status: Patient Agreed - Adherence/Education

## 2023-06-26 NOTE — Clinical Note
Medication(s) Requested: clonazepam  Last office visit: 10/04/19  Last refill: 06/28/19  Is the patient due for refill of this medication(s): No, 07/25 would be 28th day  PDMP review:  Forwarded to Physician/MONI for further review and decision on medication(s) requested.        Stopped pantoprazole and increased famotidine to twice daily. I have follow-up in a month to see how it is going.  Alise

## 2023-06-27 ENCOUNTER — VIRTUAL VISIT (OUTPATIENT)
Dept: PSYCHOLOGY | Facility: CLINIC | Age: 83
End: 2023-06-27
Payer: COMMERCIAL

## 2023-06-27 DIAGNOSIS — F33.1 MODERATE EPISODE OF RECURRENT MAJOR DEPRESSIVE DISORDER (H): Primary | ICD-10-CM

## 2023-06-27 PROCEDURE — 90837 PSYTX W PT 60 MINUTES: CPT | Mod: VID

## 2023-06-27 NOTE — PROGRESS NOTES
[facility]                                     Progress Note    Patient Name: Ginger Marshall  Date: 6/27/23         Service Type: Individual      Session Start Time: 3:00 pm  Session End Time: 3:45 pm     Session Length: 45 min    Session #: 7    Attendees: Client attended alone    Service Modality:  Video Visit:      Provider verified identity through the following two step process.  Patient provided:  Patient is known previously to provider    Telemedicine Visit: The patient's condition can be safely assessed and treated via synchronous audio and visual telemedicine encounter.      Reason for Telemedicine Visit: Patient convenience (e.g. access to timely appointments / distance to available provider)    Originating Site (Patient Location): Patient's home    Distant Site (Provider Location): Provider Remote Setting- Home Office    Consent:  The patient/guardian has verbally consented to: the potential risks and benefits of telemedicine (video visit) versus in person care; bill my insurance or make self-payment for services provided; and responsibility for payment of non-covered services.     Patient would like the video invitation sent by:  My Chart    Mode of Communication:  Video Conference via [employer]    Distant Location (Provider):  Off-site    As the provider I attest to compliance with applicable laws and regulations related to telemedicine.    DATA  Interactive Complexity: Yes, visit entailed Interactive Complexity evidenced by: lack of access to sessions due to medical complexity, resistance to having sessions when caregivers were present in the home.    Crisis: No      Progress Since Last Session (Related to Symptoms / Goals / Homework):   Symptoms: No change continued depression, grief/loss    Homework: Partially completed      Episode of Care Goals: Minimal progress - ACTION (Actively working towards change); Intervened by reinforcing change plan / affirming steps taken     Current  / Ongoing Stressors and Concerns:   Isolation, need to socialize. Distress regarding estranged sisters behavior. Fired caregivers.  Ongoing: Winter Fall while walking dog, injuries in both arms, elbow, wrist, rib. Grieving loss of adult son who was blind and lived alone fell and passed away, second loss of an adult/child. Noticing regrets about parenting. Distress regarding sister with Alzheimer's who has left local residence and is not in contact.   Treatment Objective(s) Addressed in This Session:    Practice boundaries and radical acceptance of reality regarding sister, sons, reality of accident  Improve quantity and quality of night time sleep / decrease daytime naps   Engage in activities at [facility]     Intervention:        Supportive therapy:  Provided active listening and validation. Reinforced help seeking communication and behaviors.    Motivational Interviewing  Target Behavior: radical acceptance of reality, gratitude, explore [place] [facility] activities    Stage of Change: ACTION (Actively working towards change)    MI Intervention: Expressed Empathy/Understanding, Supported Autonomy, Collaboration, Evocation and Open-ended questions     Change Talk Expressed by the Patient: Desire to change Reasons to change Activation    Provider Response to Change Talk: E - Evoked more info from patient about behavior change and A - Affirmed patient's thoughts, decisions, or attempts at behavior change    Assessments completed prior to visit:  DA  12/5/22  The following assessments were completed by patient for this visit:  PHQ2:       2/27/2023     8:50 AM 2/24/2023     6:48 AM 2/20/2023     5:39 PM 1/15/2023    11:54 AM 11/16/2022     1:33 PM 8/5/2022     8:11 AM 7/6/2022     4:18 PM   PHQ-2 ( 1999 [employer])   Q1: Little interest or pleasure in doing things 0 1 1 1 1 3    Q2: Feeling down, depressed or hopeless 0 1 1 1 1 0    PHQ-2 Score 0 2 2 2 2 3    Q1: Little interest or pleasure in doing things   Several days Several days Several days Several days Nearly every day    Q2: Feeling down, depressed or hopeless  Several days Several days Several days Several days Not at all    PHQ-2 Score  2 2 2 2 3 Incomplete     PHQ9:       12/5/2022     9:19 AM 12/12/2022     9:43 AM 1/9/2023     3:10 PM 2/21/2023    11:28 AM 2/23/2023     8:40 AM 3/14/2023    12:35 PM 6/5/2023     2:31 PM   PHQ-9 SCORE   PHQ-9 Total Score MyChart 9 (Mild depression) 7 (Mild depression) 7 (Mild depression)  4 (Minimal depression) 2 (Minimal depression)    PHQ-9 Total Score 9 7 7 5 4 2    2 10     GAD2:       12/5/2022     9:19 AM 1/3/2023    11:27 AM 2/2/2023     7:27 AM 2/23/2023     8:41 AM 3/12/2023    10:36 AM   SPRING-2   Feeling nervous, anxious, or on edge 1    1  1 1 1    1   Not being able to stop or control worrying 1    1 1 1 0 0    0   SPRING-2 Total Score 2    2  2 1 1    1     GAD7:       2/22/2018     1:46 PM 6/5/2019     4:10 PM 6/16/2020     9:54 AM 7/6/2022     4:18 PM 8/5/2022     8:11 AM 11/16/2022     1:33 PM 6/5/2023     2:31 PM   SPRING-7 SCORE   Total Score    2 (minimal anxiety) 5 (mild anxiety) 2 (minimal anxiety)    Total Score 0 0 7 2 5 2 3     CAGE-AID:       12/5/2022     9:28 AM   CAGE-AID Total Score   Total Score 0   Total Score MyChart 0 (A total score of 2 or greater is considered clinically significant)     PROMIS 10-Global Health (only subscores and total score):       9/21/2017    11:00 AM 11/15/2018     1:00 PM 3/25/2022     7:34 AM 12/5/2022     9:28 AM 3/12/2023    10:38 AM   PROMIS-10 Scores Only   Global Mental Health Score 13 12 12 8    8 8    8   Global Physical Health Score 14 12 12 13    13 11    11   PROMIS TOTAL - SUBSCORES 27 24 24 21    21 19    19     [place] Suicide Severity Rating Scale (Lifetime/Recent)      12/5/2022    11:00 AM   [place] Suicide Severity Rating (Lifetime/Recent)   Q1 Wished to be Dead (Past Month) no   Q2 Suicidal Thoughts (Past Month) no   Q3 Suicidal Thought Method no   Q4  Suicidal Intent without Specific Plan no   Q5 Suicide Intent with Specific Plan no   Q6 Suicide Behavior (Lifetime) no   Level of Risk per Screen low risk         ASSESSMENT: Current Emotional / Mental Status (status of significant symptoms):   Risk status (Self / Other harm or suicidal ideation)   Patient denies current fears or concerns for personal safety.   Patient denies current or recent suicidal ideation or behaviors.   Patient denies current or recent homicidal ideation or behaviors.   Patient denies current or recent self injurious behavior or ideation.   Patient denies other safety concerns.   Patient reports there has been no change in risk factors since their last session.     Patient reports there has been no change in protective factors since their last session.     Recommended that patient call 911 or go to the local ED should there be a change in any of these risk factors.     Appearance:   Appropriate    Eye Contact:   Good    Psychomotor Behavior: Normal    Attitude:   Pleasant Attentive   Orientation:   All   Speech    Rate / Production: Emotional Normal     Volume:  Normal    Mood:    Depressed    Affect:    Appropriate    Thought Content:  Clear    Thought Form:  Coherent  Logical  Circumstantial   Insight:    Good      Medication Review:   No changes to current psychiatric medication(s)     Medication Compliance:   Yes     Changes in Health Issues:   None reported     Chemical Use Review:   Substance Use: Chemical use reviewed, no active concerns identified      Tobacco Use: No change in amount of tobacco use since last session.  Patient declined discussion at this time    Diagnosis:  1. Moderate episode of recurrent major depressive disorder (H)        Collateral Reports Completed:   Not Applicable    PLAN: (Patient Tasks / Therapist Tasks / Other)   Embrace radical acceptance and boundaries. Check out [facility] activities    Rocío Arenas, LICSW 6/27/2023                                                            ______________________________________________________________________  Answers for HPI/ROS submitted by the patient on 1/9/2023  If you checked off any problems, how difficult have these problems made it for you to do your work, take care of things at home, or get along with other people?: Somewhat difficult  PHQ9 TOTAL SCORE: 7                                              Individual Treatment Plan    Patient's Name: Ginger Marshall  YOB: 1940    Date of Creation: 2/8/23  Date Treatment Plan Last Reviewed/Revised: 6/27/2023    DSM5 Diagnoses: 296.31 (F33.0) Major Depressive Disorder, Recurrent Episode, Mild With anxious distress  Psychosocial / Contextual Factors: aging, losses, generational trauma  PROMIS (reviewed every 90 days):   21  12/5/22    Referral / Collaboration:  Referral to another professional/service is not indicated at this time..    Anticipated number of session for this episode of care: 6-9 sessions  Anticipation frequency of session: Every other week  Anticipated Duration of each session: 38-52 minutes  Treatment plan will be reviewed in 90 days or when goals have been changed.       MeasurableTreatment Goal(s) related to diagnosis / functional impairment(s)  Goal 1: Patient will experience an improvement in mood and functioning as evidenced by assessment scores, self report and clinician observation    I will know I've met my goal when things feel better (paraphrase).      Objective #A (Patient Action)    Patient will increase understanding of steps in the grief process   Talk to others about losses  Use prayer practices and jena community to support well being.   Practice boundaries and radical acceptance of reality regarding sister sons, reality of accident   [facility]  Status: 6/27/2023    Intervention(s)  Therapist will teach CBT, DBT and support grief processing skills, prayer practices.    Objective  #B  Patient will Increase interest, engagement, and pleasure in doing things  Decrease frequency and intensity of feeling down, depressed, hopeless  Improve quantity and quality of night time sleep / decrease daytime naps  Feel less tired and more energy during the day   Improve diet, appetite, mindful eating, and / or meal planning  Identify negative self-talk and behaviors: challenge core beliefs, myths, and actions  Improve concentration, focus, and mindfulness in daily activities   Feel less fidgety, restless or slow in daily activities / interpersonal interactions.  Status: 6/27/2023    Intervention(s)  Therapist will teach cbt, dbt,MI, mindfulness and behavioral activation and assign homework.      Patient has reviewed and agreed to the above plan.      Rocío Arenas, Northern Light C.A. Dean HospitalDIANNE  6/27/2023

## 2023-06-28 NOTE — TELEPHONE ENCOUNTER
PA Initiation    Medication: RINVOQ 15 MG PO TB24  Insurance Company: [employer] Part D - Phone [phone] Fax [phone]  Pharmacy Filling the Rx: [facility] - [place], [place] - [facility] [address]  Filling Pharmacy Phone: [phone]  Filling Pharmacy Fax: [phone]  Start Date: 6/27/2023

## 2023-06-28 NOTE — TELEPHONE ENCOUNTER
Prior Authorization Approval    Medication: RINVOQ 15 MG PO TB24  Authorization Effective Date: 6/27/2023  Authorization Expiration Date: 12/31/2023  Approved Dose/Quantity:   Reference #:     Insurance Company: [employer] Part D - Phone [phone] Fax [phone]  Expected CoPay:       CoPay Card Available:      Financial Assistance Needed:   Which Pharmacy is filling the prescription: [facility] - [facility] [address]  Pharmacy Notified: Yes  Patient Notified: Yes **Instructed pharmacy to notify patient when script is ready to /ship.**

## 2023-07-10 ENCOUNTER — TRANSFERRED RECORDS (OUTPATIENT)
Dept: HEALTH INFORMATION MANAGEMENT | Facility: CLINIC | Age: 83
End: 2023-07-10

## 2023-07-24 ENCOUNTER — PATIENT OUTREACH (OUTPATIENT)
Dept: CARE COORDINATION | Facility: CLINIC | Age: 83
End: 2023-07-24
Payer: COMMERCIAL

## 2023-07-24 NOTE — PROGRESS NOTES
Clinic Care Coordination Contact  Community Health Worker Follow Up    Care Gaps: Eye appointment is scheduled for end of the summer - pt is unaware of exact date as it is outside the [facility] system.    Health Maintenance Due   Topic Date Due    URINE DRUG SCREEN  Never done    EYE EXAM  08/18/2021       Care Gap Goal set: Yes    Care Plan:   Care Plan: Specialty Referral       Problem: Patient is in need of specialty care       Long-Range Goal: Establish consistent relationship with specialist(s) as needed       Start Date: 6/8/2023 Expected End Date: 12/29/2023    This Visit's Progress: 40% Recent Progress: 20%    Note:     Barriers: diagnosis of multiple, chronic, complex medical conditions, provider availability - wait time to complete appointments, etc.   Strengths: motivated, engaged in care coordination.   Patient expressed understanding of goal: yes  Action steps to achieve this goal:  1. I will follow up with my providers as scheduled/recommended.   - TCO: 06/05/23 - follow up scheduled as recommended 6 weeks, date unknown  - MRI: 06/12/23 (completed on 6/20/23 in the ED)  - Bone stimulator pending  - MTM Pharmacy: 06/26/23 (completed)  - Mental Health: 06/27/23 (completed)  - Dentist: 07/20/23 (completed)  - Eye exam: Scheduled end of summer - date unknown  - MTM Pharmacy: 7/31/23  - Endocrinology: 08/16/23  - Lab: 08/22/23  - Mental Health: 08/24/23  - Rheumatology: 08/25/23  - Primary Care Provider 12/2023 TBD  - Dexa: 02/06/24  - Podiatry 05/11/23 - declines to follow up at this time.   - Physical Therapy: TBD  2. I will discuss, review, schedule and complete recommended overdue health maintenance with my Primary Care Provider.   3. I will contact my care team with questions, concerns, support needs. I will use the clinic as a resource and I understand I can contact my clinic with 24/7 after hours services available. Care Coordinator will remain available as needed.      Goal updated 7/24/23                               Care Plan: Financial Wellbeing       Problem: Patient expresses financial resource strain       Long-Range Goal: Create an action plan to increase financial stability       Start Date: 6/8/2023 Expected End Date: 12/29/2023    This Visit's Progress: 50% Recent Progress: 10%    Note:     Barriers: diagnosis of multiple, chronic, complex, medical conditions, limited income  Strengths: motivated, engaged in care coordination, receiving food stamps and has a county worker.   Patient expressed understanding of goal: Yes  Action steps to achieve this goal:  1. I will review the following resources: (completed)  Hot Meals:   Easter by the [facility]                          [address], [place] [address]  Note: Please do NOT go to the building located at [address]. Loaves & Fishes meals are served at the corner of [address] and [address].     Meals Served: Monday - Thursday from 5:30 - 6:30 PM  Service Location: Meals served at the [facility] front doors (2 white double doors)  [employer] - [phone]            [address] [facility], [address]  2nd Thursday of each month - curbside available. 5:30-6:30 pm     [facility] - [phone]                        [address] [place] [address]  Wednesdays, 5:45-6:25 pm during school year only.      [facility] [employer] - [phone]        [address] [facility] [address]  Every 3rd and 4th Thursday, 5:00-6:00 pm.                    Curbside only due to COVID-19.  Those wishing a meal should enter the lower parking lot and follow directions. Meals are picked up by Door 1.     [facility] - [phone]     [address] [place], [place]   Drive Through Meals: Monday 5:30-6:30 pm     [facility] - [phone]  [address] [place] [address]  Monday thru Thursday, 5:30-6:30 pm. Curbside only due to COVID-19.     Food Pantries:    [facility] [place] [phone] [address] [place], [place], [address]   Partners with local charities and offers the [address] [place] [address], formerly [facility] Food Shelves      [facility] - [phone] [address] [place] [address] Hours: Tue 10:20 AM - 3:40 PM , Thu 10:20 AM - 3:40 PM  Fees: Free       The main service they offer is [employer] food shelf. They also run several federal government [facility] supported programs in [facility]. This is run in partnership with [address] [place] [address] and other local social service agencies.      [facility] - [phone] [address] [place] [address]    Hours: Tue 11:30 AM - 12:30 PM  Fees: Self Pay       [facility] - [phone] [address] [place] [address]   Hours: Mon - Wed 10:00 AM - 3:00 PM , Tue 5:30 PM - 7:30 PM , Thu 5:30 PM - 7:30 PM , Thu - Fri 10:00 AM - 12:00 PM  Fees: Free       [facility]  [phone]   Serves families in [facility]. Programs can provide food, meals, and other non-financial support.      [facility] [phone] I   [address] [facility] [address] [place], [place], [address]   This location operates a food shelf and other social service programs. Assistance offered is extensive, and includes personal hygiene supplies, household products, laundry and household . Or speak to a  and apply for other resources such as food stamps, [facility] welfare, or more.      A second location of the [facility] is located at [address] [place], [place], [address]. They offer many of the same programs and resources as indicated above.      [address] [place] [address] - [facility] Food Shelf - [phone] [address] [place] [address] Hours: Tue 10:00 PM - 3:00 PM Appt. Only, u 12:00 PM - 6:00 PM Appt. Only   Fees: Free  (7/24/23 - going to check into this resource)     [facility]  - [phone] [address] [place] [address]  Hours: Sat 11:30 PM - 12:30 PM  Fees: Free       [facility] [phone]   [address] [place], [place], [address]   The non-profit offers Free Food for seniors and other income qualified elderly and residents. The main resource they offer from this facility is the Nutrition Assistance for Seniors (NAPS) supplemental food program. Government commodities, vouchers, and other food assistance is provided     [facility] [phone] [address] [place], [place], [address]      [facility] - [phone] [address] [place] [address]  Hours: Tue 1:00 PM - 3:30 PM  Fees: Free    [address] [place] [address] - [facility] - Coordinated Entry   [address] [place] [address]  Fees: Free [phone]   Hours: Mon - Thu 9:00 AM - 4:00 PM      [facility]   [address]  [place] [address]  Fees: Free [phone]  Hours: Mon 4:00 PM - 6:00 PM Tue 11:00 AM - 1:00 PM , Wed 4:00 PM - 6:00 PM , Thu 10:30 AM - 12:30 PM, 2-3 PM      [facility] [address] [place] [address]  Fees: Free [phone]  Hours: Tue - Sat 10:00 AM - 6:00 PM      [facility] - [phone] Ext. 234   [address] [place], [place] [address]Bcfyx: Tue 10:00 AM - 12:00 PM  Fees: Free      Additional food pantries operate in [facility] [phone]   The centers may offer groceries, hot meals, and special seasonal programs such as summer snacks for students or free Jewels and Thanksgiving meals      2. I will sign up for [employer] program and is utilizing $80 per month at [employer] location in her area (completed)                                Intervention and Education during outreach:   Pt reports signing up for the [employer] program and  has already used her $80/month allotted through the program in July at a [employer] sight near her. She is very pleased to be able to use this program! Pt will look into the [address] [place] [address] website for other sites to supplement her groceries each month.   Pt completed 7/20/23 Dental appointment; she has another appointment in August and September to repair her teeth issues.    CHW asks if pt has any further concerns or need for resources as this time. Pt states she does not. CHW made sure pt has CHW contact information. Pt will contact CHW with questions or updates before next outreach.     CHW Plan: CHW will follow up with pt in one month.    Melissa Mckeon  Community Health Worker  [facility]  [phone]

## 2023-07-31 ENCOUNTER — VIRTUAL VISIT (OUTPATIENT)
Dept: PHARMACY | Facility: CLINIC | Age: 83
End: 2023-07-31
Payer: COMMERCIAL

## 2023-07-31 DIAGNOSIS — G62.9 NEUROPATHY: ICD-10-CM

## 2023-07-31 DIAGNOSIS — K21.9 GASTROESOPHAGEAL REFLUX DISEASE, UNSPECIFIED WHETHER ESOPHAGITIS PRESENT: Primary | ICD-10-CM

## 2023-07-31 DIAGNOSIS — E78.5 HYPERLIPIDEMIA LDL GOAL <100: Chronic | ICD-10-CM

## 2023-07-31 PROCEDURE — 99207 PR NO CHARGE LOS: CPT | Mod: VID | Performed by: PHARMACIST

## 2023-07-31 RX ORDER — ATORVASTATIN CALCIUM 40 MG/1
40 TABLET, FILM COATED ORAL DAILY
Qty: 90 TABLET | Refills: 1 | Status: SHIPPED | OUTPATIENT
Start: 2023-07-31 | End: 2024-02-21

## 2023-07-31 RX ORDER — HYDROCORTISONE 2.5 %
CREAM (GRAM) TOPICAL DAILY PRN
COMMUNITY
End: 2023-08-08

## 2023-07-31 ASSESSMENT — ENCOUNTER SYMPTOMS
NUMBNESS: 0
DECREASED CONCENTRATION: 1
WEAKNESS: 0
DISTURBANCES IN COORDINATION: 0
DIZZINESS: 1
SPEECH CHANGE: 0
HEADACHES: 0
NERVOUS/ANXIOUS: 0
MEMORY LOSS: 0
TINGLING: 0
PANIC: 0
DEPRESSION: 0
SEIZURES: 0
INSOMNIA: 1
PARALYSIS: 0
TREMORS: 0
LOSS OF CONSCIOUSNESS: 0

## 2023-07-31 NOTE — PATIENT INSTRUCTIONS
"Recommendations from today's MTM visit:                                                       No medicine changes today.   I will send a refill for the atorvastatin and start the refill request for the hydrocortisone 2.5 %.     Follow-up: Return in about 6 months (around 1/31/2024). OR sooner if needed.    It was great speaking with you today.  I value your experience and would be very thankful for your time in providing feedback in our clinic survey. In the next few days, you may receive an email or text message from [employer] with a link to a survey related to your  clinical pharmacist.\"     To schedule another MTM appointment, please call the clinic directly or you may call the MTM scheduling line at [phone] or toll-free at [phone].     My Clinical Pharmacist's contact information:                                                      Please feel free to contact me with any questions or concerns you have.      Alise Harley, PharmD  Medication Therapy Management Pharmacist  [facility] - Monday and Wednesday 7:30 - 4:00      "

## 2023-07-31 NOTE — PROGRESS NOTES
Medication Therapy Management (MTM) Encounter    ASSESSMENT:                            Medication Adherence/Access: No issues identified    Peripheral neuropathy (feet and lower legs): stable. Could consider using lower dose of gabapentin but she does not want to try that now. Would proceed with gabapentin cautiously due to fall history and past side effects.     GERD: Improved, stable.    Refilled atorvastatin per patient request today.     PLAN:                            No medicine changes today.   I will send a refill for the atorvastatin and start the refill request for the hydrocortisone 2.5 %.     Follow-up: Return in about 6 months (around 1/31/2024).    SUBJECTIVE/OBJECTIVE:                          Ginger Marshall is a 83 year old female contacted via secure video for a follow-up visit from 6/26/23.       Reason for visit: follow-up gerd and neuropathy.    Allergies/ADRs: Reviewed in chart  Past Medical History: Reviewed in chart  Tobacco: She reports that she quit smoking about 24 years ago. Her smoking use included cigarettes. She has a 41.00 pack-year smoking history. She has never used smokeless tobacco.  Alcohol: not currently using    Medication Adherence/Access: Patient uses pill box(es).  Patient takes medications 2 time(s) per day.   Per patient, misses medication 0 times per week.   Medication barriers: none.   The patient fills medications at [facility]: YES.    Peripheral neuropathy (feet and lower legs)   acetaminophen 1300 mg once daily (middle of day)  naproxen 440 mg daily but not using daily. Has started using this in place of gabapentin.    Patent reports wobbly legs in the morning when on gabapentin so she stopped it. She reports that her neuropathy has worsened but tolerable at this point.    GERD:   Pantoprazole 40 mg once daily in the morning - restarted after last visit when famotidine twice daily was not working.  sucralfate 1 gm four times daily as needed - has not needed since  restarting pantoprazole.    Medication History: she tried switching to famotidine but symptoms worsened.    The patient not have history of GI bleed. She used to take Excedrin a lot and ended up with ulcers. That is why the PPI was started.     Since last visit her symptoms have been well controlled back on pantoprazole.    Today's Vitals: There were no vitals taken for this visit.  ----------------    I spent 20 minutes with this patient today. All changes were made via collaborative practice agreement with Duy Leyva MD. A copy of the visit note was provided to the patient's provider(s).    A summary of these recommendations was sent via [employer].    Alise Harley, PharmD  Medication Therapy Management Pharmacist    Telemedicine Visit Details  Type of service:  Video Conference via [employer]  Start Time: 9:03 AM  End Time: 9:23 AM     Medication Therapy Recommendations  No medication therapy recommendations to display

## 2023-08-01 ENCOUNTER — MYC MEDICAL ADVICE (OUTPATIENT)
Dept: ENDOCRINOLOGY | Facility: CLINIC | Age: 83
End: 2023-08-01
Payer: COMMERCIAL

## 2023-08-01 ENCOUNTER — TELEPHONE (OUTPATIENT)
Dept: ENDOCRINOLOGY | Facility: CLINIC | Age: 83
End: 2023-08-01

## 2023-08-01 NOTE — TELEPHONE ENCOUNTER
[facility] Call Center    Phone Message    May a detailed message be left on voicemail: yes     Reason for Call: Other: .     Per Patient states she is wanting to get a call back. Patient states she is wanting to confirm what the appt with Dr. Smith is in regards to. Patient thought the appt was for a Biopsy. Please advise.     Action Taken: Message routed to:  [facility] Endo    Travel Screening: Not Applicable

## 2023-08-01 NOTE — TELEPHONE ENCOUNTER
Endocrine visit was for osteoporosis.  Per last note--  DEXA in 2/2024 (if possible at [facility])  Follow up after that.    I am not sure which biopsy is she mentioning?

## 2023-08-01 NOTE — TELEPHONE ENCOUNTER
Please advise if appt is needed. Pt was seen 2/17/23 for osteoporosis per notes pt was to follow up in 1 year.

## 2023-08-01 NOTE — TELEPHONE ENCOUNTER
She has a new referral for Multinodular goiter.    Charline Moreau CMA  [facility] [place]  [phone]

## 2023-08-02 ENCOUNTER — PATIENT OUTREACH (OUTPATIENT)
Dept: CARE COORDINATION | Facility: CLINIC | Age: 83
End: 2023-08-02

## 2023-08-02 ENCOUNTER — OFFICE VISIT (OUTPATIENT)
Dept: ENDOCRINOLOGY | Facility: CLINIC | Age: 83
End: 2023-08-02
Attending: STUDENT IN AN ORGANIZED HEALTH CARE EDUCATION/TRAINING PROGRAM
Payer: COMMERCIAL

## 2023-08-02 VITALS
HEART RATE: 79 BPM | WEIGHT: 203.2 LBS | HEIGHT: 61 IN | DIASTOLIC BLOOD PRESSURE: 72 MMHG | OXYGEN SATURATION: 95 % | SYSTOLIC BLOOD PRESSURE: 116 MMHG | BODY MASS INDEX: 38.36 KG/M2 | TEMPERATURE: 98 F | RESPIRATION RATE: 16 BRPM

## 2023-08-02 DIAGNOSIS — E04.2 MULTINODULAR GOITER: Primary | Chronic | ICD-10-CM

## 2023-08-02 DIAGNOSIS — M81.0 OSTEOPOROSIS, UNSPECIFIED OSTEOPOROSIS TYPE, UNSPECIFIED PATHOLOGICAL FRACTURE PRESENCE: Chronic | ICD-10-CM

## 2023-08-02 PROCEDURE — 99215 OFFICE O/P EST HI 40 MIN: CPT | Performed by: INTERNAL MEDICINE

## 2023-08-02 RX ORDER — HYDROCORTISONE 2.5 %
CREAM (GRAM) TOPICAL DAILY PRN
Qty: 30 G | Refills: 0 | Status: CANCELLED | OUTPATIENT
Start: 2023-08-02

## 2023-08-02 ASSESSMENT — PAIN SCALES - GENERAL: PAINLEVEL: NO PAIN (0)

## 2023-08-02 NOTE — LETTER
8/2/2023         RE: Ginger Marshall  [address]  [facility] [address]        Dear Colleague,    Thank you for referring your patient, Ginger Marshall, to the [facility]. Please see a copy of my visit note below.    .    Name: Ginger Marshall  Seen for f/u of osteoporosis.     New referral by primary care provider for multinodular goiter.  HPI:  Ginger Marshall is a 82 year old female who presents for the evaluation of osteoporosis.   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     Was seen by  previously.    Osteoporosis:  Since last visit she sustained right olecranon and left distal radius fracture in 2/2023 after a fall. S/p ORIF of right elbow and left wrist fracture on February 12, 2023.      DEXA 2/2022: Osteoporosis. There has been no significant change in bone density of the hip(s). The left forearm was not included on the previous study so comparison is not possible.    The spine is not acceptable for evaluation due to previous surgical changes.    The right femur is not acceptable for evaluation due to previous arthroplasty.     RISK FACTORS: Post-menopausal, Height loss of 3.5 inches, Parent history of osteoporosis with a hip fracture, Fractures of wrist and pelvis,  Condition related to  bone loss: rheumatoid arthritis, long term steroid treatment, follow up osteoporosis    CURRENT TREATMENT:  Calcium, Vitamin D, previous Reclast.  2013, 2014, 2016.   ( No Reclast [id number])  Last Reclast was 3/2023. ( it was restarted at that time by PCP)  Tolerating OK.    Multinodular thyroid goiter:  Initially diagnosed in 2010.  She had periodic ultrasound done.  3/2023 thyroid ultrasound: Multinodular goiter.  Interval increase in nodule #6 on left side.  She never had any biopsy done in past.    No FH of thyroid cancer  No history of radiation  No compressive s/s  Thyroid labs in acceptable range.  She is not on thyroid hormone replacement.  No other major risk factors.      GERD: on PPI. Under good control.    DENTAL: ok. No procedure planned. Has partial dentures.    KIDNEY function: within normal limits    Steroid: h/o  rheumatoid arthritis, was long term steroid treatment, was on prednisone for many year. She has been on steroid since 1980s Last use was around 2019. Now she is on Methotrexate.    Initially diagnosed with osteopenia in 2010, 2013,2015.  DEXA 2018 c/w osteoporosis.      Smoke:former smoker, quit in 1999.  Family History:Yes: mother and sister with osteoporosis.  Menstrual history/Birthing: s/p menopause. NO HRT. Wrist fractures after fall.  Fractures:h/o pelvic fracture. . She had sacral fracture in 2013. Treated non surgically. H/o non traumatic displaced inferior pubic ramus fracture in 2/2015. H/o right wrist fracture.  Kidney stones: No  GI Surgery:No  Duration of therapy: Reclast as noted above  Exercise: Not much. Walks her dog. Has back pain.  Diet: dairy 1-2 servings/day.  Ca/Vitamin D: calcium- 600 mg once a day , Vit D 1000 international unit(s) BID  Alcohol: social  Eating Disorder: No    PMH/PSH:  Past Medical History:   Diagnosis Date     Acute posthemorrhagic anemia 10/13/2012     Closed fracture of multiple ribs of left side, initial encounter 11/25/2019     COPD (chronic  obstructive pulmonary disease) (H)     no longer occurring     Ex-smoker 1999     Gastroenteritis 2020    Gastroenteritis with norovirus     Herniated nucleus pulposus, L3-4 3/1/2017     Hip joint replacement by other means 07/10/2008     History of blood transfusion      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     History of total knee replacement 2009     Menopause 1989    late 40's     Migraine 2014    resolved     Multinodular goiter      Other chronic pain     joints     Pelvic fracture (H) 2014     Rheumatic mitral stenosis      Rheumatoid arteritis (H)      S/P lumbar fusion 2017     Sleep apnea      Vitamin B12 deficiency      Past Surgical History:   Procedure Laterality Date     ABDOMEN SURGERY      c sections     ARTHROSCOPY KNEE Left      ARTHROSCOPY KNEE RT/LT  2006    left     BACK SURGERY      disc     BIOPSY BREAST       BREAST SURGERY      lumpectomy      BREAST SURGERY      lumpectomy     CATARACT EXTRACTION Bilateral      CATARACT IOL, RT/LT Bilateral 2010 aproximately      SECTION  1966      SECTION  1972     COLONOSCOPY  2009,     COLONOSCOPY       FINGER SURGERY Right 2019    Procedure: DISTAL ULNA RESECTION, RIGHT MIDDLE SILASTIC METACARPALPHALANGEAL EXCHANGE AND RIGHT ELBOW NODULE EXCISION.;  Surgeon: Hilario Redmond MD;  Location: [facility] OR;  Service: Orthopedics     FOOT SURGERY Left      GYN SURGERY  66,72     HAND SURGERY Right      HAND SURGERY Right      HC ESOPH/GAS REFLUX TEST W NASAL IMPED >1 HR  2012    Procedure:ESOPHAGEAL IMPEDENCE FUNCTION TEST WITH 24 HOUR PH GREATER THAN 1 HOUR; Surgeon:KAYKAY JULIO; Location:[facility]     IR LUMBAR EPIDURAL STEROID INJECTION       IR TRANSLAMINAR EPIDURAL LUMBAR INJ INCL IMAGING  2012    LESI L5-S1 at [facility]     LUMBAR FUSION       OPEN REDUCTION INTERNAL FIXATION ELBOW Right 2023    Procedure: OPEN REDUCTION INTERNAL  FIXATION, RIGHT OLECRANON FRACTURE;  Surgeon: Pili Brock MD;  Location: [facility] OR     OPEN REDUCTION INTERNAL FIXATION WRIST Left 2023    Procedure: OPEN REDUCTION INTERNAL FIXATION, LEFT DISTAL RADIUS FRACTURE;  Surgeon: Pili Brock MD;  Location: [facility] OR     OPTICAL TRACKING SYSTEM FUSION POSTERIOR SPINE LUMBAR N/A 2017    Procedure: OPTICAL TRACKING SYSTEM FUSION SPINE POSTERIOR LUMBAR ONE LEVEL;  Surgeon: Anthony Michele MD;  Location: [facility] OR     ORTHOPEDIC SURGERY      left foot surgery     ORTHOPEDIC SURGERY      R MCP surgery     ORTHOPEDIC SURGERY      right knee total replacement     TOTAL HIP ARTHROPLASTY Right      TOTAL KNEE ARTHROPLASTY Left      TOTAL KNEE ARTHROPLASTY Right      ZZC ANESTH,TOTAL HIP ARTHROPLASTY      Rt hip     ZZC HAND/FINGER SURGERY UNLISTED  2005    right hand     ZZC TOTAL KNEE ARTHROPLASTY  2006    left     Family Hx:  Family History   Problem Relation Age of Onset     Arthritis Mother      Alzheimer Disease Mother      Hyperlipidemia Mother      Osteoporosis Mother      Heart Disease Father         MI ( from this)     Alcohol/Drug Father      Arthritis Sister      Hypertension Sister      Other Cancer Sister         Luekemia     Cancer Son      Diabetes Son         type 1     Neurologic Disorder Sister         Schizophrenic     Hypertension Sister      Hyperlipidemia Sister      Mental Illness Sister      Diabetes Son      Other Cancer Son      Substance Abuse Son      Glaucoma Daughter      Diabetes Other         type 2     Hyperlipidemia Other      Social Hx:  Social History     Socioeconomic History     Marital status: Single     Spouse name: Not on file     Number of children: 3     Years of education: Not on file     Highest education level: Not on file   Occupational History     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   Tobacco Use     Smoking status: Former     Packs/day: 1.00     Years: 41.00      Pack years: 41.00     Types: Cigarettes     Quit date: 1999     Years since quittin.6     Smokeless tobacco: Never     Tobacco comments:     former smoker   Vaping Use     Vaping Use: Never used   Substance and Sexual Activity     Alcohol use: Yes     Alcohol/week: 0.0 standard drinks of alcohol     Comment: Occasionally,  usually wine     Drug use: No     Sexual activity: Not Currently     Partners: Male     Comment: To old for all that   Other Topics Concern     Parent/sibling w/ CABG, MI or angioplasty before 65F 55M? No      Service Not Asked     Blood Transfusions Not Asked     Caffeine Concern Yes     Comment: She has 8 cups of coffee in the AM and is done by 8-8:30 AM.     Occupational Exposure Not Asked     Hobby Hazards Not Asked     Sleep Concern Not Asked     Stress Concern Not Asked     Weight Concern Not Asked     Special Diet Not Asked     Back Care Not Asked     Exercise Yes     Comment: Sometimes.  [employer] comes 3 times a week to her building.     Bike Helmet Not Asked     Seat Belt Not Asked     Self-Exams Not Asked   Social History Narrative    She lives in a a senior living apartment building.     Social Determinants of Health     Financial Resource Strain: Low Risk  (3/3/2023)    Overall Financial Resource Strain (CARDIA)      Difficulty of Paying Living Expenses: Not hard at all   Food Insecurity: No Food Insecurity (3/3/2023)    Hunger Vital Sign      Worried About Running Out of Food in the Last Year: Never true      Ran Out of Food in the Last Year: Never true   Transportation Needs: No Transportation Needs (3/3/2023)    PRAPARE - Transportation      Lack of Transportation (Medical): No      Lack of Transportation (Non-Medical): No   Physical Activity: Not on file   Stress: Not on file   Social Connections: Not on file   Intimate Partner Violence: Not on file   Housing Stability: Not on file          MEDICATIONS:  has a current medication list which includes the  following prescription(s): acetaminophen, albuterol, atorvastatin, buspirone, calcium citrate, carboxymethylcellulose pf, cholecalciferol, desvenlafaxine, ferrous gluconate, guaifenesin, hydrocortisone, ipratropium, leucovorin, loratadine, meclizine, rasuvo, naproxen sodium, olopatadine, pantoprazole, rinvoq, vitamin c, zoledronic acid, sucralfate, and zinc gluconate.    ROS     ROS: 10 point ROS neg other than the symptoms noted above in the HPI.    Physical Exam   VS: There were no vitals taken for this visit.  GENERAL: healthy, alert and no distress  EYES: Eyes grossly normal to inspection, conjunctivae and sclerae normal  ENT: no nose swelling, nasal discharge.  Thyroid: Thyroid appears normal in size and nontender. + Small nodule on left side palpated.  CV: RRR, no rubs, gallops, no murmurs  RESP: CTAB, no wheezes, rales, or ronchi  ABDO: +BS  EXTREMITIES: no hand tremors.  NEURO: Cranial nerves grossly intact, mentation intact and speech normal  SKIN: No apparent skin lesions, rash or edema seen   PSYCH: mentation appears normal, affect normal/bright, judgement and insight intact, normal speech and appearance well-groomed    LABS:  Creatinine:  Creatinine   Date Value Ref Range Status   06/20/2023 0.60 0.51 - 0.95 mg/dL Final   05/10/2021 0.59 0.52 - 1.04 mg/dL Final     TFTs:  TSH   Date Value Ref Range Status   06/20/2023 1.29 0.30 - 4.20 uIU/mL Final   11/27/2020 1.12 0.40 - 4.00 mU/L Final     PTH: 27    Vitamin D:  Vitamin D Deficiency Screening Results:  Lab Results   Component Value Date    VITDT 60 06/05/2023    VITDT 66 04/25/2023    VITDT 95 (H) 01/16/2023    VITDT 62 10/13/2021    VITDT 63 08/18/2021       BoneTurnOverMarkers:    DEXA:  DEXA 2/2022: Osteoporosis. There has been no significant change in bone density of the hip(s). The left forearm was not included on the previous study so comparison is not possible.    The spine is not acceptable for evaluation due to previous surgical changes.     The right femur is not acceptable for evaluation due to previous arthroplasty.     Thyroid ultrasound 2023:  IMPRESSION:  1.  Multiple bilateral thyroid nodules, the majority demonstrate no  significant change in size as compared to 2/2/2022 exam, with  exception of a 3.5 cm nodule at the mid aspect of the left thyroid  lobe which demonstrates slight increase in size, previously measured  2.7 cm. No new thyroid nodule.  All pertinent notes, labs, and images personally reviewed by me.     A/P  Ms.Ruth SIM Marshall is a 83 year old here for the evaluation of:    Osteoporosis:  RISK FACTORS:  Post-menopausal, Height loss of 3.5 inches, Parent history of osteoporosis with a hip fracture, Fractures of wrist and pelvis,  Condition related to bone loss: rheumatoid arthritis, long term steroid treatment, follow up osteoporosis  DEXA 2/2022: Osteoporosis. There has been no significant change in bone density of the hip(s). The left forearm was not included on the previous study so comparison is not possible.    Previously treated wiht Reclast.  2013, 2014, 2016.   Last Reclast was 3/2023.   ( No Reclast [id number])  H/o GERD: on PPI. Under good control.  DENTAL: ok. No procedure planned. Has partial dentures.  KIDNEY function: within normal limits  Plan:  Discussed diagnosis, pathophysiology, management and treatment options of condition with pt.  DEXA in 2/2024 (if possible at [place])  Follow up after that.    Multinodular goiter:  ( New referral diagnosis)  US 3/2023: Multiple bilateral thyroid nodules.  Dominant nodule #6 on left side has increased in size.  No prior history of thyroid nodule biopsy.  No FH of thyroid cancer  No history of radiation  No compressive s/s  Thyroid labs in acceptable range.  She is not on thyroid hormone replacement.  No other major risk factors.  Plan:  Discussed diagnosis, pathophysiology, management and treatment options of condition with pt.  Recommend fine-needle aspiration biopsy of nodule  #6 on the left side  Discussed possible outcomes of biopsy including possible benign, possible malignancy and possible AUS. If AUS indication for molecular marker testing.  Discussed compressive symptoms to monitor.  Follow-up after above.    The pt was advised to  Maintain an adequate calcium and vitamin D intake and to supplement vitamin D if needed to maintain serum levels of 25 hydroxy D (25 OH D) between 30-60 ng/ml.  Limit alcohol intake to no more than 2 servings per day.  Limit caffeine intake.  Maintain an active lifestyle including weight-bearing exercises for at least 30 mins daily.  Take measures to reduce the risk of falling.    More than 50% of the time spent with Ms. Sweats on counseling / coordinating her care.      All questions were answered.  The patient indicates understanding of the above issues and agrees with the plan set forth.  Total time spent in with the patient evaluation:  20 min    Additional time spent reviewing pertinent lab tests and chart notes, and documentation: 20 min     Follow-up:  As noted in AVS    Annmarie Smith MD  Endocrinology  [facility]  CC: Gabriela Jefferson submitted by the patient for this visit:  Symptoms you have experienced in the last 30 days (Submitted on 7/31/2023)  General Symptoms: No  Skin Symptoms: No  HENT Symptoms: No  EYE SYMPTOMS: No  HEART SYMPTOMS: No  LUNG SYMPTOMS: No  INTESTINAL SYMPTOMS: No  URINARY SYMPTOMS: No  GYNECOLOGIC SYMPTOMS: No  BREAST SYMPTOMS: No  SKELETAL SYMPTOMS: No  BLOOD SYMPTOMS: No  NERVOUS SYSTEM SYMPTOMS: Yes  MENTAL HEALTH SYMPTOMS: Yes  Please answer the questions below to tell us what condition you are experiencing: (Submitted on 7/31/2023)  Trouble with coordination: No  Dizziness or trouble with balance: Yes  Fainting or black-out spells: No  Memory loss: No  Headache: No  Seizures: No  Speech problems: No  Tingling: No  Tremor: No  Weakness: No  Difficulty walking: No  Paralysis:  No  Numbness: No  Please answer the questions below to tell us what condition you are experiencing: (Submitted on 7/31/2023)  Nervous or Anxious: No  Depression: No  Trouble sleeping: Yes  Trouble thinking or concentrating: Yes  Mood changes: No  Panic attacks: No      Again, thank you for allowing me to participate in the care of your patient.        Sincerely,        Annmarie Smith MD

## 2023-08-02 NOTE — PATIENT INSTRUCTIONS
[facility]  Dr Smith, Endocrinology Department    [facility] [address] [id number]  [place] [address]  Appointment [phone]  Fax: [phone]  [place]: Monday - Thursday      Biopsy of left nodule with Radiology.  Follow up after that.     [facility] radiology scheduleing   [place]  [phone]   [facility] Radiology scheduling  [place]  [phone]     Please call and schedule the recommended test as discussed in clinic visit. These are the numbers to call.

## 2023-08-02 NOTE — TELEPHONE ENCOUNTER
Patient requested refill of this but current primary care provider has not prescribed. Routing to primary care provider for review.    Alise Harley, PharmD  Medication Therapy Management Pharmacist

## 2023-08-02 NOTE — PROGRESS NOTES
Clinic Care Coordination Contact  Care Coordination Clinician Chart review    Situation: Patient chart reviewed by care coordinator.       Background: Care Coordination initial assessment and enrollment took place 6/5/2023.   Upon initial assessment patient-centered goals were discussed and developed with participation from patient.  RNCC handed patient follow up and monitoring of goal progression off to CHW for continued outreach every 30 days.        Assessment: Per chart review, patient outreach completed by CC CHW on 07/24/2023.  Patient is actively working to accomplish goal(s) and patient's goal(s) remain(s) appropriate and relevant at this time.       Care Plan: Specialty Referral       Problem: Patient is in need of specialty care       Long-Range Goal: Establish consistent relationship with specialist(s) as needed       Start Date: 6/8/2023 Expected End Date: 12/29/2023    This Visit's Progress: 40% Recent Progress: 20%    Note:     Barriers: diagnosis of multiple, chronic, complex medical conditions, provider availability - wait time to complete appointments, etc.   Strengths: motivated, engaged in care coordination.   Patient expressed understanding of goal: yes  Action steps to achieve this goal:  1. I will follow up with my providers as scheduled/recommended.   - TCO: 06/05/23 - follow up scheduled as recommended 6 weeks, date unknown  - MRI: 06/12/23 (completed on 6/20/23 in the ED)  - Bone stimulator pending  - [facility]: 06/26/23 (completed)  - Mental Health: 06/27/23 (completed)  - Dentist: 07/20/23 (completed)  - Eye exam: Scheduled end of summer - date unknown  - [facility]: 7/31/23  - Endocrinology: 08/16/23  - Lab: 08/22/23  - Mental Health: 08/24/23  - Rheumatology: 08/25/23  - Primary Care Provider 12/2023 TBD  - Dexa: 02/06/24  - Podiatry 05/11/23 - declines to follow up at this time.   - Physical Therapy: TBD  2. I will discuss, review, schedule and complete recommended overdue health  maintenance with my Primary Care Provider.   3. I will contact my care team with questions, concerns, support needs. I will use the clinic as a resource and I understand I can contact my clinic with 24/7 after hours services available. Care Coordinator will remain available as needed.      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   Patient expressed understanding of goal: Yes  Action steps to achieve this goal:  1. I will review the following resources: (completed)  Hot Meals:   Easter by the [facility]                          [address], [place] [address]  Note: Please do NOT go to the building located at [address]. Loaves & Fishes meals are served at the corner of [address].     Meals Served: Monday - Thursday from 5:30 - 6:30 PM  Service Location: Meals served at the [facility] front doors (2 white double doors)  [employer] - [phone]            [address] [facility]. [facility], [address]  2nd Thursday of each month - curbside available. 5:30-6:30 pm     [facility] - [phone]                        [address] [place] [address]  Wednesdays, 5:45-6:25 pm during school year only.      [facility] [employer] - [phone]        [address] [facility] [address]  Every 3rd and 4th Thursday, 5:00-6:00 pm.                    Curbside only due to COVID-19.  Those wishing a meal should enter the lower parking lot and follow directions. Meals are picked up by Door  1.     [facility] - [phone]     [address], [place], [place]   Drive Through Meals: Monday 5:30-6:30 pm     [facility] - [phone]  [address], [place] [address]  Monday thru Thursday, 5:30-6:30 pm. Curbside only due to COVID-19.     Food Pantries:   [facility] [phone] [address] [place], [place], [address]   Partners with local charities and offers the [address] [place] [address], formerly [facility] Food Shelves      [facility] - [phone] [address] [place] [address] Hours: Tue 10:20 AM - 3:40 PM , Thu 10:20 AM - 3:40 PM  Fees: Free       The main service they offer is [employer] food shelf. They also run several federal government USDA supported programs in [facility]. This is run in partnership with [address] [place] [address] and other local social service agencies.      [facility] - [phone]  [address] [place] [address]    Hours: Tue 11:30 AM - 12:30 PM  Fees: Self Pay       [facility] - [phone] [address] [place] [address]   Hours: Mon - Wed 10:00 AM - 3:00 PM , Tue 5:30 PM - 7:30 PM , Thu 5:30 PM - 7:30 PM , Thu - Fri 10:00 AM - 12:00 PM  Fees: Free       [facility]  [phone]   Serves families in [facility]. Programs can provide food, meals, and other non-financial support.      [facility] [phone] i [address] [place], [place], [address]   This location operates a food shelf and other social service programs. Assistance offered is extensive, and includes personal hygiene supplies, household products, laundry and household . Or speak to a  and apply for other resources such as food stamps, TANF welfare, or more.      A second location of the [facility] is located at [address] [place], [address]. They offer many of the same programs and resources as indicated above.      [address] [facility] Food Shelf - [phone] [address] [place] [address] Hours: Tue 10:00 PM - 3:00 PM Appt. Only, Thu 12:00 PM - 6:00 PM Appt. Only  Fees: Free  (7/24/23 - going to check into this resource)     [facility]  - [phone] [address] [place] [address]  Hours: Sat 11:30 PM - 12:30 PM  Fees: Free       [facility] [phone]   [address] [place], [address]   The non-profit offers Free Food for seniors and other income qualified elderly and residents. The main resource they offer from this facility is the Nutrition Assistance for Seniors (NAPS) supplemental food program. Government commodities, vouchers, and other food assistance is provided     [place] [employer] [facility] [phone] I [address] [place] [address], [address]      [facility] - [phone] [address] [place] [address]  Hours: Tue 1:00 PM - 3:30 PM  Fees: Free    [address] [place] [address] - Coordinated Entry   [address] [place] [address]  Fees: Free [phone]   Hours: Mon - Thu 9:00 AM - 4:00 PM      [facility]   [address] [place] [address]  Fees: Free [phone]  Hours: Mon 4:00 PM - 6:00 PM Tue 11:00 AM - 1:00 PM , Wed 4:00 PM - 6:00 PM , Thu 10:30 AM - 12:30 PM, 2-3 PM      [facility] [address] [place] [address]  Fees: Free [phone]  Hours: Tue - Sat 10:00 AM - 6:00 PM      [facility] - [phone] Ext. [address] [place], [place] [address]Rvpgg: Tue 10:00 AM - 12:00 PM  Fees: Free      Additional food pantries operate in [facility] [phone]   The centers may offer groceries, hot meals, and special seasonal programs  such as summer snacks for students or free [place] and Thanksgiving meals      2. I will sign up for [employer] program and is utilizing $80 per month at [facility] location in her area (completed)                                    Plan/Recommendations: RNCC Clinical Assessments to be completed annually or as needed. Annual Assessment will be due 06/2024. The patient will continue working with Care Coordination to achieve goal(s) as above.  CHW will involve RNCC as needed or if patient is ready to transition to goal status of Maintenance.  RNCC will continue to review progress to goal(s) and CHW outreaches every 6 weeks.     Complex Care Plan:  Patient is not due for updated Plan of Care.  Care Plan updated and sent to patient: No    Lucille Awad RN Care Coordinator  [facility] [place]  Email: [email or web address]  Phone: [phone]

## 2023-08-02 NOTE — PROGRESS NOTES
Name: Ginger Marshall  Seen for f/u of osteoporosis.     New referral by primary care provider for multinodular goiter.  HPI:  Ginger Marshall is a 82 year old female who presents for the evaluation of osteoporosis.   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     Was seen by  previously.    Osteoporosis:  Since last visit she sustained right olecranon and left distal radius fracture in 2/2023 after a fall. S/p ORIF of right elbow and left wrist fracture on February 12, 2023.      DEXA 2/2022: Osteoporosis. There has been no significant change in bone density of the hip(s). The left forearm was not included on the previous study so comparison is not possible.    The spine is not acceptable for evaluation due to previous surgical changes.    The right femur is not acceptable for evaluation due to previous arthroplasty.     RISK FACTORS: Post-menopausal, Height loss of 3.5 inches, Parent history of osteoporosis with a hip fracture, Fractures of wrist and pelvis,  Condition related to bone loss: rheumatoid arthritis, long term steroid treatment, follow up osteoporosis    CURRENT TREATMENT:  Calcium, Vitamin D, previous Reclast.  2013, 2014, 2016.   ( No Reclast [id number])  Last Reclast was 3/2023. ( it was restarted at that time by  PCP)  Tolerating OK.    Multinodular thyroid goiter:  Initially diagnosed in 2010.  She had periodic ultrasound done.  3/2023 thyroid ultrasound: Multinodular goiter.  Interval increase in nodule #6 on left side.  She never had any biopsy done in past.    No FH of thyroid cancer  No history of radiation  No compressive s/s  Thyroid labs in acceptable range.  She is not on thyroid hormone replacement.  No other major risk factors.      GERD: on PPI. Under good control.    DENTAL: ok. No procedure planned. Has partial dentures.    KIDNEY function: within normal limits    Steroid: h/o  rheumatoid arthritis, was long term steroid treatment, was on prednisone for many year. She has been on steroid since 1980s Last use was around 2019. Now she is on Methotrexate.    Initially diagnosed with osteopenia in 2010, 2013,2015.  DEXA 2018 c/w osteoporosis.      Smoke:former smoker, quit in 1999.  Family History:Yes: mother and sister with osteoporosis.  Menstrual history/Birthing: s/p menopause. NO HRT. Wrist fractures after fall.  Fractures:h/o pelvic fracture. . She had sacral fracture in 2013. Treated non surgically. H/o non traumatic displaced inferior pubic ramus fracture in 2/2015. H/o right wrist fracture.  Kidney stones: No  GI Surgery:No  Duration of therapy: Reclast as noted above  Exercise: Not much. Walks her dog. Has back pain.  Diet: dairy 1-2 servings/day.  Ca/Vitamin D: calcium- 600 mg once a day , Vit D 1000 international unit(s) BID  Alcohol: social  Eating Disorder: No    PMH/PSH:  Past Medical History:   Diagnosis Date    Acute posthemorrhagic anemia 10/13/2012    Closed fracture of multiple ribs of left side, initial encounter 11/25/2019    COPD (chronic obstructive pulmonary disease) (H) 1980's    no longer occurring    Ex-smoker 01/1999    Gastroenteritis 03/21/2020    Gastroenteritis with norovirus    Herniated nucleus pulposus, L3-4 3/1/2017    Hip joint replacement by other means 07/10/2008    History  of blood transfusion     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    History of total knee replacement 2009    Menopause 1989    late 40's    Migraine 2014    resolved    Multinodular goiter     Other chronic pain     joints    Pelvic fracture (H) 2014    Rheumatic mitral stenosis     Rheumatoid arteritis (H)     S/P lumbar fusion 2017    Sleep apnea     Vitamin B12 deficiency      Past Surgical History:   Procedure Laterality Date    ABDOMEN SURGERY      c sections    ARTHROSCOPY KNEE Left     ARTHROSCOPY KNEE RT/LT  2006    left    BACK SURGERY  2013    disc    BIOPSY BREAST      BREAST SURGERY      lumpectomy s    BREAST SURGERY      lumpectomy    CATARACT EXTRACTION Bilateral     CATARACT IOL, RT/LT Bilateral 2010 aproximately     SECTION  1966     SECTION  1972    COLONOSCOPY  2009,    COLONOSCOPY      FINGER SURGERY Right 2019    Procedure: DISTAL ULNA RESECTION, RIGHT MIDDLE SILASTIC METACARPALPHALANGEAL EXCHANGE AND RIGHT ELBOW NODULE EXCISION.;  Surgeon: Hilario Redmond MD;  Location: [facility] OR;  Service: Orthopedics    FOOT SURGERY Left     GYN SURGERY  66,72    HAND SURGERY Right     HAND SURGERY Right     HC ESOPH/GAS REFLUX TEST W NASAL IMPED >1 HR  2012    Procedure:ESOPHAGEAL IMPEDENCE FUNCTION TEST WITH 24 HOUR PH GREATER THAN 1 HOUR; Surgeon:KAYKAY JULIO; Location:[facility]    IR LUMBAR EPIDURAL STEROID INJECTION      IR TRANSLAMINAR EPIDURAL LUMBAR INJ INCL IMAGING  2012    [facility] L5-S1 at [facility]    LUMBAR FUSION      OPEN REDUCTION INTERNAL FIXATION ELBOW Right 2023    Procedure: OPEN REDUCTION INTERNAL FIXATION, RIGHT OLECRANON FRACTURE;  Surgeon: Pili Brock MD;  Location:  OR    OPEN REDUCTION INTERNAL FIXATION WRIST Left 2023    Procedure: OPEN REDUCTION INTERNAL FIXATION, LEFT DISTAL RADIUS FRACTURE;  Surgeon: Pili Brock MD;  Location:  OR    OPTICAL TRACKING  SYSTEM FUSION POSTERIOR SPINE LUMBAR N/A 2017    Procedure: OPTICAL TRACKING SYSTEM FUSION SPINE POSTERIOR LUMBAR ONE LEVEL;  Surgeon: Anthony Michele MD;  Location: [facility] OR    ORTHOPEDIC SURGERY      left foot surgery    ORTHOPEDIC SURGERY      R MCP surgery    ORTHOPEDIC SURGERY      right knee total replacement    TOTAL HIP ARTHROPLASTY Right     TOTAL KNEE ARTHROPLASTY Left     TOTAL KNEE ARTHROPLASTY Right     ZZC ANESTH,TOTAL HIP ARTHROPLASTY      Rt hip    ZZC HAND/FINGER SURGERY UNLISTED  2005    right hand    ZZC TOTAL KNEE ARTHROPLASTY  2006    left     Family Hx:  Family History   Problem Relation Age of Onset    Arthritis Mother     Alzheimer Disease Mother     Hyperlipidemia Mother     Osteoporosis Mother     Heart Disease Father         MI ( from this)    Alcohol/Drug Father     Arthritis Sister     Hypertension Sister     Other Cancer Sister         Luekemia    Cancer Son     Diabetes Son         type 1    Neurologic Disorder Sister         Schizophrenic    Hypertension Sister     Hyperlipidemia Sister     Mental Illness Sister     Diabetes Son     Other Cancer Son     Substance Abuse Son     Glaucoma Daughter     Diabetes Other         type 2    Hyperlipidemia Other      Social Hx:  Social History     Socioeconomic History    Marital status: Single     Spouse name: Not on file    Number of children: 3    Years of education: Not on file    Highest education level: Not on file   Occupational History    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   Tobacco Use    Smoking status: Former     Packs/day: 1.00     Years: 41.00     Pack years: 41.00     Types: Cigarettes     Quit date: 1999     Years since quittin.6    Smokeless tobacco: Never    Tobacco comments:     former smoker   Vaping Use    Vaping Use: Never used   Substance and Sexual Activity    Alcohol use: Yes     Alcohol/week: 0.0 standard drinks of alcohol     Comment: Occasionally,   usually wine    Drug use: No    Sexual activity: Not Currently     Partners: Male     Comment: To old for all that   Other Topics Concern    Parent/sibling w/ CABG, MI or angioplasty before 65F 55M? No     Service Not Asked    Blood Transfusions Not Asked    Caffeine Concern Yes     Comment: She has 8 cups of coffee in the AM and is done by 8-8:30 AM.    Occupational Exposure Not Asked    Hobby Hazards Not Asked    Sleep Concern Not Asked    Stress Concern Not Asked    Weight Concern Not Asked    Special Diet Not Asked    Back Care Not Asked    Exercise Yes     Comment: Sometimes.  Gordo Redd comes 3 times a week to her building.    Bike Helmet Not Asked    Seat Belt Not Asked    Self-Exams Not Asked   Social History Narrative    She lives in a a senior living apartment building.     Social Determinants of Health     Financial Resource Strain: Low Risk  (3/3/2023)    Overall Financial Resource Strain (CARDIA)     Difficulty of Paying Living Expenses: Not hard at all   Food Insecurity: No Food Insecurity (3/3/2023)    Hunger Vital Sign     Worried About Running Out of Food in the Last Year: Never true     Ran Out of Food in the Last Year: Never true   Transportation Needs: No Transportation Needs (3/3/2023)    PRAPARE - Transportation     Lack of Transportation (Medical): No     Lack of Transportation (Non-Medical): No   Physical Activity: Not on file   Stress: Not on file   Social Connections: Not on file   Intimate Partner Violence: Not on file   Housing Stability: Not on file          MEDICATIONS:  has a current medication list which includes the following prescription(s): acetaminophen, albuterol, atorvastatin, buspirone, calcium citrate, carboxymethylcellulose pf, cholecalciferol, desvenlafaxine, ferrous gluconate, guaifenesin, hydrocortisone, ipratropium, leucovorin, loratadine, meclizine, rasuvo, naproxen sodium, olopatadine, pantoprazole, rinvoq, vitamin c, zoledronic acid, sucralfate, and zinc  gluconate.    ROS     ROS: 10 point ROS neg other than the symptoms noted above in the HPI.    Physical Exam   VS: There were no vitals taken for this visit.  GENERAL: healthy, alert and no distress  EYES: Eyes grossly normal to inspection, conjunctivae and sclerae normal  ENT: no nose swelling, nasal discharge.  Thyroid: Thyroid appears normal in size and nontender. + Small nodule on left side palpated.  CV: RRR, no rubs, gallops, no murmurs  RESP: CTAB, no wheezes, rales, or ronchi  ABDO: +BS  EXTREMITIES: no hand tremors.  NEURO: Cranial nerves grossly intact, mentation intact and speech normal  SKIN: No apparent skin lesions, rash or edema seen   PSYCH: mentation appears normal, affect normal/bright, judgement and insight intact, normal speech and appearance well-groomed    LABS:  Creatinine:  Creatinine   Date Value Ref Range Status   06/20/2023 0.60 0.51 - 0.95 mg/dL Final   05/10/2021 0.59 0.52 - 1.04 mg/dL Final     TFTs:  TSH   Date Value Ref Range Status   06/20/2023 1.29 0.30 - 4.20 uIU/mL Final   11/27/2020 1.12 0.40 - 4.00 mU/L Final     PTH: 27    Vitamin D:  Vitamin D Deficiency Screening Results:  Lab Results   Component Value Date    VITDT 60 06/05/2023    VITDT 66 04/25/2023    VITDT 95 (H) 01/16/2023    VITDT 62 10/13/2021    VITDT 63 08/18/2021       BoneTurnOverMarkers:    DEXA:  DEXA 2/2022: Osteoporosis. There has been no significant change in bone density of the hip(s). The left forearm was not included on the previous study so comparison is not possible.    The spine is not acceptable for evaluation due to previous surgical changes.    The right femur is not acceptable for evaluation due to previous arthroplasty.     Thyroid ultrasound 2023:  IMPRESSION:  1.  Multiple bilateral thyroid nodules, the majority demonstrate no  significant change in size as compared to 2/2/2022 exam, with  exception of a 3.5 cm nodule at the mid aspect of the left thyroid  lobe which demonstrates slight increase  in size, previously measured  2.7 cm. No new thyroid nodule.  All pertinent notes, labs, and images personally reviewed by me.     A/P  Ms.Ruth SIM Marshall is a 83 year old here for the evaluation of:    Osteoporosis:  RISK FACTORS:  Post-menopausal, Height loss of 3.5 inches, Parent history of osteoporosis with a hip fracture, Fractures of wrist and pelvis,  Condition related to bone loss: rheumatoid arthritis, long term steroid treatment, follow up osteoporosis  DEXA 2/2022: Osteoporosis. There has been no significant change in bone density of the hip(s). The left forearm was not included on the previous study so comparison is not possible.    Previously treated wiht Reclast.  2013, 2014, 2016.   Last Reclast was 3/2023.   ( No Reclast [id number])  H/o GERD: on PPI. Under good control.  DENTAL: ok. No procedure planned. Has partial dentures.  KIDNEY function: within normal limits  Plan:  Discussed diagnosis, pathophysiology, management and treatment options of condition with pt.  DEXA in 2/2024 (if possible at [place])  Follow up after that.    Multinodular goiter:  ( New referral diagnosis)  US 3/2023: Multiple bilateral thyroid nodules.  Dominant nodule #6 on left side has increased in size.  No prior history of thyroid nodule biopsy.  No FH of thyroid cancer  No history of radiation  No compressive s/s  Thyroid labs in acceptable range.  She is not on thyroid hormone replacement.  No other major risk factors.  Plan:  Discussed diagnosis, pathophysiology, management and treatment options of condition with pt.  Recommend fine-needle aspiration biopsy of nodule #6 on the left side  Discussed possible outcomes of biopsy including possible benign, possible malignancy and possible AUS. If AUS indication for molecular marker testing.  Discussed compressive symptoms to monitor.  Follow-up after above.    The pt was advised to  Maintain an adequate calcium and vitamin D intake and to supplement vitamin D if needed to  maintain serum levels of 25 hydroxy D (25 OH D) between 30-60 ng/ml.  Limit alcohol intake to no more than 2 servings per day.  Limit caffeine intake.  Maintain an active lifestyle including weight-bearing exercises for at least 30 mins daily.  Take measures to reduce the risk of falling.    More than 50% of the time spent with Ms. Sweats on counseling / coordinating her care.      All questions were answered.  The patient indicates understanding of the above issues and agrees with the plan set forth.  Total time spent in with the patient evaluation:  20 min    Additional time spent reviewing pertinent lab tests and chart notes, and documentation: 20 min     Follow-up:  As noted in AVS    Annmarie Smith MD  Endocrinology  [facility]  CC: Gabriela Jefferson submitted by the patient for this visit:  Symptoms you have experienced in the last 30 days (Submitted on 7/31/2023)  General Symptoms: No  Skin Symptoms: No  HENT Symptoms: No  EYE SYMPTOMS: No  HEART SYMPTOMS: No  LUNG SYMPTOMS: No  INTESTINAL SYMPTOMS: No  URINARY SYMPTOMS: No  GYNECOLOGIC SYMPTOMS: No  BREAST SYMPTOMS: No  SKELETAL SYMPTOMS: No  BLOOD SYMPTOMS: No  NERVOUS SYSTEM SYMPTOMS: Yes  MENTAL HEALTH SYMPTOMS: Yes  Please answer the questions below to tell us what condition you are experiencing: (Submitted on 7/31/2023)  Trouble with coordination: No  Dizziness or trouble with balance: Yes  Fainting or black-out spells: No  Memory loss: No  Headache: No  Seizures: No  Speech problems: No  Tingling: No  Tremor: No  Weakness: No  Difficulty walking: No  Paralysis: No  Numbness: No  Please answer the questions below to tell us what condition you are experiencing: (Submitted on 7/31/2023)  Nervous or Anxious: No  Depression: No  Trouble sleeping: Yes  Trouble thinking or concentrating: Yes  Mood changes: No  Panic attacks: No

## 2023-08-05 ENCOUNTER — TRANSFERRED RECORDS (OUTPATIENT)
Dept: MULTI SPECIALTY CLINIC | Facility: CLINIC | Age: 83
End: 2023-08-05

## 2023-08-05 LAB — RETINOPATHY: NORMAL

## 2023-08-07 ENCOUNTER — MYC MEDICAL ADVICE (OUTPATIENT)
Dept: FAMILY MEDICINE | Facility: CLINIC | Age: 83
End: 2023-08-07
Payer: COMMERCIAL

## 2023-08-07 DIAGNOSIS — L30.4 INTERTRIGO: Primary | ICD-10-CM

## 2023-08-07 NOTE — TELEPHONE ENCOUNTER
Brief chart review.    Appears I prescribed this one year ago for ongoing inframammary intertrigo.     Can you please ask her what she is using this for?    Thanks,    Duy Leyva MD  [facility]  8/7/2023

## 2023-08-07 NOTE — TELEPHONE ENCOUNTER
Sent patient a [employer] message (8/7/23) asking what she uses the hydrocortisone cream for.    Angie Chan RN on 8/7/2023 at 2:51 PM

## 2023-08-08 RX ORDER — HYDROCORTISONE 2.5 %
CREAM (GRAM) TOPICAL 2 TIMES DAILY
Qty: 20 G | Refills: 0 | Status: SHIPPED | OUTPATIENT
Start: 2023-08-08 | End: 2024-10-03

## 2023-08-08 NOTE — TELEPHONE ENCOUNTER
Brief chart review.    Will refill hydrocortisone, 2.5%. Please let her know that she should not be using this chronically as it can thin the skin and cause bleaching.     Duy Leyva MD  [facility]  8/8/2023

## 2023-08-10 ENCOUNTER — HOSPITAL ENCOUNTER (OUTPATIENT)
Dept: ULTRASOUND IMAGING | Facility: CLINIC | Age: 83
Discharge: HOME | End: 2023-08-10
Attending: INTERNAL MEDICINE | Admitting: INTERNAL MEDICINE
Payer: COMMERCIAL

## 2023-08-10 DIAGNOSIS — E04.2 MULTINODULAR GOITER: Chronic | ICD-10-CM

## 2023-08-10 PROCEDURE — 250N000009 HC RX 250: Performed by: RADIOLOGY

## 2023-08-10 PROCEDURE — 88173 CYTOPATH EVAL FNA REPORT: CPT | Mod: TC | Performed by: INTERNAL MEDICINE

## 2023-08-10 PROCEDURE — 272N000431 US BIOPSY THYROID FINE NEEDLE ASPIRATION

## 2023-08-10 RX ORDER — LIDOCAINE HYDROCHLORIDE 10 MG/ML
10 INJECTION, SOLUTION EPIDURAL; INFILTRATION; INTRACAUDAL; PERINEURAL ONCE
Status: COMPLETED | OUTPATIENT
Start: 2023-08-10 | End: 2023-08-10

## 2023-08-10 RX ADMIN — LIDOCAINE HYDROCHLORIDE 10 ML: 10 INJECTION, SOLUTION EPIDURAL; INFILTRATION; INTRACAUDAL; PERINEURAL at 13:11

## 2023-08-10 NOTE — PROGRESS NOTES
Left thyroid biopsy complete. Pt tolerated well. Samples obtained and placed on wet/dry slides as well as in Affirma-brought to lab. Site covered with bandage. Reviewed discharge instructions with pt. Pt ambulated on discharge with walker.

## 2023-08-14 LAB
PATH REPORT.COMMENTS IMP SPEC: NORMAL
PATH REPORT.COMMENTS IMP SPEC: NORMAL
PATH REPORT.FINAL DX SPEC: NORMAL
PATH REPORT.GROSS SPEC: NORMAL
PATH REPORT.MICROSCOPIC SPEC OTHER STN: NORMAL
PATH REPORT.RELEVANT HX SPEC: NORMAL

## 2023-08-14 PROCEDURE — 88173 CYTOPATH EVAL FNA REPORT: CPT | Mod: 26 | Performed by: PATHOLOGY

## 2023-08-14 NOTE — RESULT ENCOUNTER NOTE
Ginger    Recently done endocrinology lab test/ imaging test showed:  Fine-needle aspiration biopsy of nodule #6 on the left side is consistent with benign cytology.  This is reassuring.  Will recommend repeat thyroid US in 1 year to assess for interval change in size and characteristics of thyroid nodules.    Here is a copy for your records.  Follow up as discussed in last clinic visit.    Please call endocrinology clinic ( [phone]) if questions.    Annmarie Smith MD  Endocrinology   [facility] [place]  August 14, 2023

## 2023-08-19 ENCOUNTER — HEALTH MAINTENANCE LETTER (OUTPATIENT)
Age: 83
End: 2023-08-19

## 2023-08-22 ENCOUNTER — PATIENT OUTREACH (OUTPATIENT)
Dept: CARE COORDINATION | Facility: CLINIC | Age: 83
End: 2023-08-22

## 2023-08-22 ENCOUNTER — TELEPHONE (OUTPATIENT)
Dept: CARE COORDINATION | Facility: CLINIC | Age: 83
End: 2023-08-22

## 2023-08-22 ENCOUNTER — LAB (OUTPATIENT)
Dept: LAB | Facility: CLINIC | Age: 83
End: 2023-08-22
Payer: COMMERCIAL

## 2023-08-22 DIAGNOSIS — M05.79 RHEUMATOID ARTHRITIS INVOLVING MULTIPLE SITES WITH POSITIVE RHEUMATOID FACTOR (H): ICD-10-CM

## 2023-08-22 DIAGNOSIS — Z79.899 HIGH RISK MEDICATION USE: ICD-10-CM

## 2023-08-22 LAB
ALBUMIN SERPL BCG-MCNC: 4.6 G/DL (ref 3.5–5.2)
ALP SERPL-CCNC: 37 U/L (ref 35–104)
ALT SERPL W P-5'-P-CCNC: 21 U/L (ref 0–50)
AST SERPL W P-5'-P-CCNC: 30 U/L (ref 0–45)
BASOPHILS # BLD AUTO: 0 10E3/UL (ref 0–0.2)
BASOPHILS NFR BLD AUTO: 0 %
BILIRUB DIRECT SERPL-MCNC: <0.2 MG/DL (ref 0–0.3)
BILIRUB SERPL-MCNC: 0.4 MG/DL
CREAT SERPL-MCNC: 0.63 MG/DL (ref 0.51–0.95)
CRP SERPL-MCNC: <3 MG/L
EOSINOPHIL # BLD AUTO: 0.1 10E3/UL (ref 0–0.7)
EOSINOPHIL NFR BLD AUTO: 3 %
ERYTHROCYTE [DISTWIDTH] IN BLOOD BY AUTOMATED COUNT: 19 % (ref 10–15)
ERYTHROCYTE [SEDIMENTATION RATE] IN BLOOD BY WESTERGREN METHOD: 16 MM/HR (ref 0–30)
GFR SERPL CREATININE-BSD FRML MDRD: 89 ML/MIN/1.73M2
HCT VFR BLD AUTO: 35.5 % (ref 35–47)
HGB BLD-MCNC: 11.4 G/DL (ref 11.7–15.7)
IMM GRANULOCYTES # BLD: 0 10E3/UL
IMM GRANULOCYTES NFR BLD: 1 %
LYMPHOCYTES # BLD AUTO: 1 10E3/UL (ref 0.8–5.3)
LYMPHOCYTES NFR BLD AUTO: 25 %
MCH RBC QN AUTO: 29.6 PG (ref 26.5–33)
MCHC RBC AUTO-ENTMCNC: 32.1 G/DL (ref 31.5–36.5)
MCV RBC AUTO: 92 FL (ref 78–100)
MONOCYTES # BLD AUTO: 0.6 10E3/UL (ref 0–1.3)
MONOCYTES NFR BLD AUTO: 14 %
NEUTROPHILS # BLD AUTO: 2.4 10E3/UL (ref 1.6–8.3)
NEUTROPHILS NFR BLD AUTO: 57 %
PLATELET # BLD AUTO: 335 10E3/UL (ref 150–450)
PROT SERPL-MCNC: 7.8 G/DL (ref 6.4–8.3)
RBC # BLD AUTO: 3.85 10E6/UL (ref 3.8–5.2)
WBC # BLD AUTO: 4.2 10E3/UL (ref 4–11)

## 2023-08-22 PROCEDURE — 80076 HEPATIC FUNCTION PANEL: CPT

## 2023-08-22 PROCEDURE — 36415 COLL VENOUS BLD VENIPUNCTURE: CPT

## 2023-08-22 PROCEDURE — 85025 COMPLETE CBC W/AUTO DIFF WBC: CPT

## 2023-08-22 PROCEDURE — 86140 C-REACTIVE PROTEIN: CPT

## 2023-08-22 PROCEDURE — 82565 ASSAY OF CREATININE: CPT

## 2023-08-22 PROCEDURE — 85652 RBC SED RATE AUTOMATED: CPT

## 2023-08-22 NOTE — PROGRESS NOTES
Clinic Care Coordination Contact  Community Health Worker Follow Up    Care Gaps: Discussed. Pt is not sure she has a scheduled eye exam. Pt has contact information for her eye provider; CHW recommends she calls to determine if she is due for an eye exam and schedule.     Health Maintenance Due   Topic Date Due    URINE DRUG SCREEN  Never done    EYE EXAM  08/18/2021    A1C  08/12/2023       Care Gap Goal set: Yes    Care Plan:   Care Plan: Specialty Referral       Problem: Patient is in need of specialty care       Long-Range Goal: Establish consistent relationship with specialist(s) as needed       Start Date: 6/8/2023 Expected End Date: 12/29/2023    This Visit's Progress: 60% Recent Progress: 40%    Note:     Barriers: diagnosis of multiple, chronic, complex medical conditions, provider availability - wait time to complete appointments, etc.   Strengths: motivated, engaged in care coordination.   Patient expressed understanding of goal: yes  Action steps to achieve this goal:  1. I will follow up with my providers as scheduled/recommended.   - TCO: 06/05/23 - follow up scheduled as recommended 6 weeks, date unknown  - MRI: 06/12/23 (completed on 6/20/23 in the ED)  - Bone stimulator pending  - MTM Pharmacy: 06/26/23 (completed)  - Mental Health: 06/27/23 (completed)  - Dentist: 07/20/23 (completed)  - Eye exam: Scheduled end of summer - date unknown  - MTM Pharmacy: 7/31/23 (completed)  - Endocrinology: 08/2/23 (completed)  - Lab: 08/22/23 (completed)  - Mental Health: 08/24/23 (pt cancelled this visit)  - Rheumatology: 08/25/23  - Primary Care Provider 12/2023 TBD  - Dexa: 02/06/24  - Podiatry 05/11/23 - declines to follow up at this time.   - Physical Therapy: TBD  2. I will discuss, review, schedule and complete recommended overdue health maintenance with my Primary Care Provider.   3. I will contact my care team with questions, concerns, support needs. I will use the clinic as a resource and I understand I  can contact my clinic with 24/7 after hours services available. Care Coordinator will remain available as needed.      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   Patient expressed understanding of goal: Yes  Action steps to achieve this goal:  1. I will review the following resources: (completed)  Hot Meals:   E[facility] by the [facility]                          [address], [place] [address]  Note: Please do NOT go to the building located at [address]. Loaves & Fishes meals are served at the corner of [address] and [address].     Meals Served: Monday - Thursday from 5:30 - 6:30 PM  Service Location: Meals served at the [facility] front doors (2 white double doors)  [employer] - [phone]            [address] [facility]. [facility], [address]  2nd Thursday of each month - curbside available. 5:30-6:30 pm     [facility] - [phone]                        [address] [place] [address]  Wednesdays, 5:45-6:25 pm during school year only.      [facility] [employer] - [phone]        [address] [place] [address]  Every 3rd and 4th Thursday, 5:00-6:00 pm.                    Curbside only due to COVID-19.  Those wishing a meal should enter the lower parking lot and follow directions. Meals are picked up by Door 1.     [facility] - [phone]     [address] [place], [place]   Drive Through Meals: Monday 5:30-6:30 pm     [facility] -  [phone]  [address], [place] [address]  Monday thru Thursday, 5:30-6:30 pm. Curbside only due to COVID-19.     Food Pantries:   Count includes the [facility] [phone] [address], [place], [address]   Partners with local charities and offers the [address] [place] [address], Critical access hospital Food Shelves      [facility] the [facility] - [phone] [address] [place] [address] Hours: Tue 10:20 AM - 3:40 PM , Thu 10:20 AM - 3:40 PM  Fees: Free       The main service they offer is [employer] food shelf. They also run several federal government USDA supported programs in [facility]. This is run in partnership with [address] [place] [address] and other local social service agencies.      [facility] - [facility] - [phone]  [address] [place] [address]    Hours: Tue 11:30 AM - 12:30 PM  Fees: Self Pay       [facility] - [phone] [address] [place] [address]   Hours: Mon - Wed 10:00 AM - 3:00 PM , Tue 5:30 PM - 7:30 PM , Thu 5:30 PM - 7:30 PM , Thu - Fri 10:00 AM - 12:00 PM  Fees: Free       [facility]  [phone]   Serves families in [facility]. Programs can provide food, meals, and other non-financial support.      [facility] [phone] I   [address] [place], [address]   This location operates a food shelf and other social service programs. Assistance offered is extensive, and includes personal hygiene supplies, household products, laundry and household . Or speak to a  and apply for other resources such as food stamps, [facility] welfare, or more.      A second location of the [facility] is located at [address] [place], [address]. They offer many of the same programs and resources as indicated above.      [address] [place] [address]  [facility] [employer] Shelf - [phone] [address] [place] [address] Hours: Tue 10:00 PM - 3:00 PM Appt. Only, Thu 12:00 PM - 6:00 PM Appt. Only  Fees: Free  (7/24/23 - going to check into this resource)     [facility]  - [phone] [address] [place] [address]  Hours: Sat 11:30 PM - 12:30 PM  Fees: Free       [facility] [phone]   [address] [place], [place], [address]   The non-profit offers Free Food for seniors and other income qualified elderly and residents. The main resource they offer from this facility is the Nutrition Assistance for Seniors (NAPS) supplemental food program. Government commodities, vouchers, and other food assistance is provided     [employer] [facility] [phone] [address] [place], [place], [address]      [facility] [employer] - [phone] [address] [place] [address]  Hours: Tue 1:00 PM - 3:30 PM  Fees: Free    [address] [facility] Coordinated [facility]   [address] [place] [address]  Fees: Free [phone]   Hours: Mon - Thu 9:00 AM - 4:00 PM      [facility]   [address]  [place] [address]  Fees: Free [phone]  Hours: Mon 4:00 PM - 6:00 PM Tue 11:00 AM - 1:00 PM , Wed 4:00 PM - 6:00 PM , Thu 10:30 AM - 12:30 PM, 2-3 PM      [facility] [employer] [facility] [address] [place] [address]  Fees: Free [phone]  Hours: Tue - Sat 10:00 AM - 6:00 PM      [facility] [employer] [facility] - [phone] Ext. [phone]   [address] [place], [place] [address]Fcfdv: Tue 10:00 AM - 12:00 PM  Fees: Free      Additional food pantries operate in [facility] [phone]   The centers may offer groceries, hot meals, and special seasonal programs such as summer snacks for students or free [place] and Thanksgiving meals      2. I will sign up for [employer] program and is utilizing $80 per month at [employer]  location in her area (completed)                                Intervention and Education during outreach:   Called pt later this afternoon due to being at a prayer group earlier.  Reviewed upcoming appointment.  Pt cancelled her 8/25/23 therapy visit, because she no longer feels she needs them. Pt feels as though her mood is well controlled. CHW encourages her to reach out the her therapist in the future if she feels it would benefit her MH.  Pt is wondering if she should have her A1C checked, as it is listed as being overdue. Pt is agreeable to CHW reaching out to RD nurse triage team to address this question.  Did not address financial wellbeing with pt this afternoon.    CHW asks if pt has any further concerns or need for resources as this time. Pt states she does not. CHW made sure pt has CHW contact information. Pt will contact CHW with questions or updates before next outreach.     CHW Plan: CHW will route message to the Liane RN triage team to address A1C lab which is due. CHW will follow up with pt in one month.    Melissa Mckeon  Community Health Worker  [facility]  [phone]

## 2023-08-22 NOTE — PROGRESS NOTES
Clinic Care Coordination Contact  [facility]       Clinical Data: Care Coordinator Outreach  Outreach attempted x 1.  Pt participating in a prayer group currently. Asks to be contacted in 1 hour.  Plan: Care Coordinator will attempt to contact around 3:30 pm this date.      Melissa Mckeon  Community Health Worker  [facility]  [phone]

## 2023-08-22 NOTE — TELEPHONE ENCOUNTER
Hello [facility] Triage Team,    I spoke with pt this afternoon. She is wondering if she should come to the lab for A1C blood draw? Might you be able to contact her about this? She does not have an appointment with Dr. Leyva until 2/28/24.    Thanks so much!!    Melissa Mckeon  Community Health Worker  [facility]  [phone]

## 2023-08-23 ENCOUNTER — VIRTUAL VISIT (OUTPATIENT)
Dept: PSYCHOLOGY | Facility: CLINIC | Age: 83
End: 2023-08-23
Payer: COMMERCIAL

## 2023-08-23 DIAGNOSIS — F33.1 MODERATE EPISODE OF RECURRENT MAJOR DEPRESSIVE DISORDER (H): Primary | ICD-10-CM

## 2023-08-23 PROCEDURE — 90834 PSYTX W PT 45 MINUTES: CPT | Mod: VID

## 2023-08-23 NOTE — PROGRESS NOTES
[facility]                                     Progress Note    Patient Name: Ginger Marshall  Date: 8/23/23         Service Type: Individual      Session Start Time: 11:00 am  Session End Time: 11:45 am     Session Length: 45 min    Session #: 8    Attendees: Client attended alone    Service Modality:  Video Visit:      Provider verified identity through the following two step process.  Patient provided:  Patient is known previously to provider    Telemedicine Visit: The patient's condition can be safely assessed and treated via synchronous audio and visual telemedicine encounter.      Reason for Telemedicine Visit: Patient convenience (e.g. access to timely appointments / distance to available provider)    Originating Site (Patient Location): Patient's home    Distant Site (Provider Location): Provider Remote Setting- Home Office    Consent:  The patient/guardian has verbally consented to: the potential risks and benefits of telemedicine (video visit) versus in person care; bill my insurance or make self-payment for services provided; and responsibility for payment of non-covered services.     Patient would like the video invitation sent by:  My Chart    Mode of Communication:  Video Conference via [employer]well    Distant Location (Provider):  Off-site    As the provider I attest to compliance with applicable laws and regulations related to telemedicine.    DATA  Interactive Complexity: No     Crisis: No      Progress Since Last Session (Related to Symptoms / Goals / Homework):   Symptoms: No change continued depression , low energy    Homework: Partially completed      Episode of Care Goals: Minimal progress - ACTION (Actively working towards change); Intervened by reinforcing change plan / affirming steps taken     Current / Ongoing Stressors and Concerns:  Low motivation regarding household chores, cognitive barriers to seeking more resources. Low quality sleep, even with CPAP-waking up tired,  nap in afternoon. Focus on socializing with various [employer] groups. Need for  food resources/other financial resources for dog care, etc  Distress regarding lack of contact, estranged sisters behavior. Fired caregivers.  Ongoing: Winter Fall while walking dog, injuries in both arms, elbow, wrist, rib. Grieving loss of adult son who was blind and lived alone fell and passed away, second loss of an adult/child. Noticing regrets about parenting. Distress regarding sister with Alzheimer's who has left local residence and is not in contact.   Treatment Objective(s) Addressed in This Session:    Practice boundaries and radical acceptance of reality regarding sister, sons, reality of accident  Improve quantity and quality of night time sleep / decrease daytime naps      Intervention:        Supportive therapy:  Provided active listening and validation. Validated focus on spirituality/M[employer] connections/meaning.  Explored resistance to moving in with daughter/other living / support options. Reinforced help seeking communication and behaviors.    Motivational Interviewing  Target Behavior:  radical acceptance of reality, gratitude , reframe POV regarding seeking help-for food access, sleep medicine.     Stage of Change: ACTION (Actively working towards change), Preparation    MI Intervention: Expressed Empathy/Understanding, Supported Autonomy, Collaboration, Evocation and Open-ended questions     Change Talk Expressed by the Patient: Desire to change Reasons to change Activation    Provider Response to Change Talk: E - Evoked more info from patient about behavior change and A - Affirmed patient's thoughts, decisions, or attempts at behavior change    Assessments completed prior to visit:  DA  12/5/22  The following assessments were completed by patient for this visit:  PHQ2:       2/27/2023     8:50 AM 2/24/2023     6:48 AM 2/20/2023     5:39 PM 1/15/2023    11:54 AM 11/16/2022     1:33 PM 8/5/2022     8:11 AM 7/6/2022      4:18 PM   PHQ-2 ( 1999 [employer])   Q1: Little interest or pleasure in doing things 0 1 1 1 1 3    Q2: Feeling down, depressed or hopeless 0 1 1 1 1 0    PHQ-2 Score 0 2 2 2 2 3    Q1: Little interest or pleasure in doing things  Several days Several days Several days Several days Nearly every day    Q2: Feeling down, depressed or hopeless  Several days Several days Several days Several days Not at all    PHQ-2 Score  2 2 2 2 3 Incomplete     PHQ9:       12/12/2022     9:43 AM 1/9/2023     3:10 PM 2/21/2023    11:28 AM 2/23/2023     8:40 AM 3/14/2023    12:35 PM 6/5/2023     2:31 PM 8/23/2023     8:30 AM   PHQ-9 SCORE   PHQ-9 Total Score MyChart 7 (Mild depression) 7 (Mild depression)  4 (Minimal depression) 2 (Minimal depression)  5 (Mild depression)   PHQ-9 Total Score 7 7 5 4 2    2 10 5     GAD2:       12/5/2022     9:19 AM 1/3/2023    11:27 AM 2/2/2023     7:27 AM 2/23/2023     8:41 AM 3/12/2023    10:36 AM 8/23/2023     8:31 AM   SPRING-2   Feeling nervous, anxious, or on edge 1    1  1 1 1    1 0   Not being able to stop or control worrying 1    1 1 1 0 0    0 0   SPRING-2 Total Score 2    2  2 1 1    1 0     GAD7:       2/22/2018     1:46 PM 6/5/2019     4:10 PM 6/16/2020     9:54 AM 7/6/2022     4:18 PM 8/5/2022     8:11 AM 11/16/2022     1:33 PM 6/5/2023     2:31 PM   SPRING-7 SCORE   Total Score    2 (minimal anxiety) 5 (mild anxiety) 2 (minimal anxiety)    Total Score 0 0 7 2 5 2 3     CAGE-AID:       12/5/2022     9:28 AM   CAGE-AID Total Score   Total Score 0   Total Score MyChart 0 (A total score of 2 or greater is considered clinically significant)     PROMIS 10-Global Health (only subscores and total score):       9/21/2017    11:00 AM 11/15/2018     1:00 PM 3/25/2022     7:34 AM 12/5/2022     9:28 AM 3/12/2023    10:38 AM 8/23/2023     8:34 AM   PROMIS-10 Scores Only   Global Mental Health Score 13 12 12 8    8 8    8 12   Global Physical Health Score 14 12 12 13    13 11    11 12   PROMIS TOTAL - SUBSCORES 27  24 24 21    21 19    19 24     Bland Suicide Severity Rating Scale (Lifetime/Recent)      12/5/2022    11:00 AM   Bland Suicide Severity Rating (Lifetime/Recent)   Q1 Wished to be Dead (Past Month) no   Q2 Suicidal Thoughts (Past Month) no   Q3 Suicidal Thought Method no   Q4 Suicidal Intent without Specific Plan no   Q5 Suicide Intent with Specific Plan no   Q6 Suicide Behavior (Lifetime) no   Level of Risk per Screen low risk         ASSESSMENT: Current Emotional / Mental Status (status of significant symptoms):   Risk status (Self / Other harm or suicidal ideation)   Patient denies current fears or concerns for personal safety.   Patient denies current or recent suicidal ideation or behaviors.   Patient denies current or recent homicidal ideation or behaviors.   Patient denies current or recent self injurious behavior or ideation.   Patient denies other safety concerns.   Patient reports there has been no change in risk factors since their last session.     Patient reports there has been no change in protective factors since their last session.     Recommended that patient call 911 or go to the local ED should there be a change in any of these risk factors.     Appearance:   Appropriate    Eye Contact:   Good    Psychomotor Behavior: Normal    Attitude:   Pleasant Attentive   Orientation:   All   Speech    Rate / Production: Emotional Normal     Volume:  Normal    Mood:    Depressed    Affect:    Appropriate    Thought Content:  Clear    Thought Form:  Coherent  Logical  Circumstantial   Insight:    Good      Medication Review:   No changes to current psychiatric medication(s)     Medication Compliance:   Yes     Changes in Health Issues:   None reported     Chemical Use Review:   Substance Use: Chemical use reviewed, no active concerns identified      Tobacco Use: No change in amount of tobacco use since last session.  Patient declined discussion at this time    Diagnosis:  1. Moderate episode of recurrent  major depressive disorder (H)          Collateral Reports Completed:   Not Applicable    PLAN: (Patient Tasks / Therapist Tasks / Other)   Embrace radical acceptance and boundaries. Check out CPAP appt options, other food / social service resources    Rocío Arenas [facility] 8/23/2023                                                           ______________________________________________________________________  Answers for HPI/ROS submitted by the patient on 1/9/2023  If you checked off any problems, how difficult have these problems made it for you to do your work, take care of things at home, or get along with other people?: Somewhat difficult  PHQ9 TOTAL SCORE: 7                                              Individual Treatment Plan    Patient's Name: Ginger Marshall  YOB: 1940    Date of Creation: 2/8/23  Date Treatment Plan Last Reviewed/Revised: 6/27/2023    DSM5 Diagnoses: 296.31 (F33.0) Major Depressive Disorder, Recurrent Episode, Mild With anxious distress  Psychosocial / Contextual Factors: aging, losses, generational trauma  PROMIS (reviewed every 90 days):   21  12/5/22    Referral / Collaboration:  Referral to another professional/service is not indicated at this time..    Anticipated number of session for this episode of care: 6-9 sessions  Anticipation frequency of session: Every other week  Anticipated Duration of each session: 38-52 minutes  Treatment plan will be reviewed in 90 days or when goals have been changed.       MeasurableTreatment Goal(s) related to diagnosis / functional impairment(s)  Goal 1: Patient will experience an improvement in mood and functioning as evidenced by assessment scores, self report and clinician observation    I will know I've met my goal when things feel better (paraphrase).      Objective #A (Patient Action)    Patient will increase understanding of steps in the grief process   Talk to others about losses  Use prayer practices and jena community to  support well being.   Practice boundaries and radical acceptance of reality regarding sister sons, reality of accident   Engage in social activities at [facility]  Status: 6/27/2023    Intervention(s)  Therapist will teach CBT, DBT  and support grief processing skills, prayer practices .    Objective #B  Patient will Increase interest, engagement, and pleasure in doing things  Decrease frequency and intensity of feeling down, depressed, hopeless  Improve quantity and quality of night time sleep / decrease daytime naps  Feel less tired and more energy during the day   Improve diet, appetite, mindful eating, and / or meal planning  Identify negative self-talk and behaviors: challenge core beliefs, myths, and actions  Improve concentration, focus, and mindfulness in daily activities   Feel less fidgety, restless or slow in daily activities / interpersonal interactions.  Status: 6/27/2023    Intervention(s)  Therapist will  teach cbt, dbt,MI, mindfulness and behavioral activation and assign homework .      Patient has reviewed and agreed to the above plan.      Rocío Arenas, [facility]  6/27/2023

## 2023-08-23 NOTE — TELEPHONE ENCOUNTER
Last hemoglobin A1C was done 8/12/22 and was 5.6.   Last cmp was 6/20/23, glucose was 123, but unsure if pt was fasting.       Called the pt.  She is not sure if she was fasting, but does not think she was.  It looks like labs were done on 6/20/23 at 1:29 p.m.   This was done when pt in the ER.      She said that a message keeps popping up on [employer] that she needs an A1C.  I do see it in care gaps.      Advised I will run it by Dr. Leyva if she wants it done now or if it can wait until appt in January.

## 2023-08-24 NOTE — TELEPHONE ENCOUNTER
Brief chart review.    Ok to wait to complete. Can consider getting at follow up appt on 2/2024.    Duy Leyva MD  [facility]  8/24/2023

## 2023-08-24 NOTE — TELEPHONE ENCOUNTER
Called patient and advised on Dr. Gordillo's recommendations. Patient agreeable to wait until her appointment in February 2024. No further questions at this time.    Angie Chan RN on 8/24/2023 at 1:33 PM

## 2023-08-25 ENCOUNTER — VIRTUAL VISIT (OUTPATIENT)
Dept: RHEUMATOLOGY | Facility: CLINIC | Age: 83
End: 2023-08-25
Payer: COMMERCIAL

## 2023-08-25 DIAGNOSIS — G47.33 OSA (OBSTRUCTIVE SLEEP APNEA): ICD-10-CM

## 2023-08-25 DIAGNOSIS — M05.79 RHEUMATOID ARTHRITIS INVOLVING MULTIPLE SITES WITH POSITIVE RHEUMATOID FACTOR (H): Primary | ICD-10-CM

## 2023-08-25 DIAGNOSIS — Z79.899 HIGH RISK MEDICATION USE: ICD-10-CM

## 2023-08-25 DIAGNOSIS — R53.83 OTHER FATIGUE: ICD-10-CM

## 2023-08-25 PROCEDURE — 99214 OFFICE O/P EST MOD 30 MIN: CPT | Mod: VID | Performed by: INTERNAL MEDICINE

## 2023-08-25 RX ORDER — LEUCOVORIN CALCIUM 5 MG/1
5 TABLET ORAL
Qty: 13 TABLET | Refills: 2 | Status: SHIPPED | OUTPATIENT
Start: 2023-08-25 | End: 2024-02-06

## 2023-08-25 RX ORDER — UPADACITINIB 15 MG/1
15 TABLET, EXTENDED RELEASE ORAL DAILY
Qty: 30 TABLET | Refills: 6 | Status: SHIPPED | OUTPATIENT
Start: 2023-08-25 | End: 2023-12-01

## 2023-08-25 NOTE — PATIENT INSTRUCTIONS
RHEUMATOLOGY    [facility]  [address] [place] [address]  [place] [address]    Phone number: [phone]  Fax number: [phone]    If you need a medication refill, please contact us as you may need lab work and/or a follow up visit prior to your refill.      Thank you for choosing [facility]!    Samantha Morales CMA Rheumatology

## 2023-08-25 NOTE — PROGRESS NOTES
Ginger Marshall  is a 83 year old year old who is being evaluated via a billable video visit.      How would you like to obtain your AVS? MyChart  If the video visit is dropped, the invitation should be resent by: Text to cell phone: [phone]   Will anyone else be joining your video visit? No      Rheumatology Video Visit      Ginger Marshall MRN# [id number]   YOB: 1940 Age: 83 year old      Date of visit: 8/25/23   PCP: [facility]     Chief Complaint   Patient presents with:  Rheumatoid Arthritis      Assessment and Plan     1. Seropositive Erosive Rheumatoid Arthritis ( [2009], CCP >100 [2009]; hx of rheumatoid nodule by 6/14/2011 pathology): Previously failed remicade (staph infection during therapy, but was immediately after a joint injection), orencia (migraines), HCQ (ineffective), Xeljanz (partially effective).  Sulfa allergy.  Currently on methotrexate 25 mg SQ once weekly (dose reduction in the past resulted in more symptoms), leucovorin 5 mg weekly (resolved nasal sore that didn't improve with higher daily folic acid doses), and Rinvoq 15mg daily.  RA well controlled since changing to Rinvoq.  Chronic illness, stable.    - Continue methotrexate 25mg SQ once weekly (Rasuvo)  - Continue leucovorin 5 mg every 7 days, 24 hours after MTX dose.   - Continue Rinvoq 15 mg daily  - Labs in 3 months: CBC, Creatinine, Hepatic Panel    High risk medication requiring intensive toxicity monitoring at least quarterly    2. 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  Doing okay at this time.     3. Right hip pain: Status post WALTER twice in the past. Followed by [facility] orthopedics as needed.  Symptoms are degenerative in nature.  Reportedly doing well at this time per patient    4. 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 Michele who is now at [facility]  Orthopedics.  Has been seen at [facility] and [facility].  She previously reported that a TENS unit was helpful, but reported that it was not helpful.  She does not want additional lumbar spine injections or back surgery.  She may follow-up with her spine surgeon or primary care provider for this issue, as needed.    5.  Thoracic back pain: Degenerative in nature.  And degenerative changes seen on 2021 chest CT.  Continue physical therapy exercises at home, as these are helpful.    6. Osteoporosis: was previously managed by endocrinology and she received reclast once in 2013, 2014, 2016. 12/12/2018 DEXA ordered by Dr. Vázquez showed osteoporosis.  Repeat DEXA on 2/2/2022 showed osteoporosis; no significant change of the hips; forearm osteoporosis but no previous evaluation of the forearm for comparison.  Reclast was restarted after sufficient drug holiday, with the first dose on 3/18/2021; again received in 3/21/2022 and 3/21/2023.  Plan to recheck DEXA in February 2024.   Chronic illness, stable.      - Reclast once yearly; next due on 3/21/2024  - Continue vitamin D 1000 IU daily  - Continue calcium 1200mg daily from supplement and dietary sources  - Plan to recheck DEXA in February 2024    7. Left 1st toe pain, history: 2 episodes of sudden onset left toe pain followed by diffuse left foot pain that made ambulation difficult.  Also with nodules over both Achilles tendons and the right elbow that are either rheumatoid nodules or tophi.  She reports having history of gout decades ago.  Denies ever being on colchicine or allopurinol.  Discussed colchicine to use if suspected gout flare occurs.  No flares since last seen so has not used colchicine.  - Colchicine: (MITIGARE) 0.6 MG capsule; Take 2 capsules (1.2 mg) by mouth once for 1 dose at the onset of a gout flare, followed by 1 capsule (0.6mg) 1 hour later.      8. Right 1st toe fracture, and left wrist fracture: following with orthopedic surgery  and podiatry.  If surgery: hold rinvoq for 5 days prior to surgery and then restart no sooner than 14 days after surgery and only in the absence of infection and delayed wound healing.     8. Chronic pain syndrome: Documented here for historical significance only.   Management per pain clinic and/or PCP    9.  Vaccinations: Vaccinations reviewed with Ms. Marshall.  Risks and benefits of vaccinations were discussed.    - Influenza: encouraged yearly vaccination, and to hold methotrexate for 2 weeks afterward  - [id number]: up to date  - [id number]: up to date  - Shingrix: Up to date   - COVID-19: Advised updating, and to hold methotrexate and Rinvoq for 2 weeks afterward    10.  Obstructive sleep apnea, fatigue, poor sleep: Uses a CPAP.  Not sleeping well through the night.  Reportedly a Fitbit shows poor sleep.  She would like to be reevaluated in the sleep medicine clinic  - Sleep medicine referral    Total minutes spent in evaluation with patient, documentation, , and review of pertinent studies and chart notes: 16     Ms. Marshall verbalized agreement with and understanding of the rational for the diagnosis and treatment plan.  All questions were answered to best of my ability and the patient's satisfaction. Ms. Marshall was advised to contact the clinic with any questions that may arise after the clinic visit.      Thank you for involving me in the care of the patient    Return to clinic: 3-4 months    HPI   Ginger Marshall is a 83 year old female with a history of multiple foot surgeries, hand tendon transfers, MCP replacements (most recent being in August 2015), bilateral TKA, right WALTER, left distal radius fracture history, gout, s/p lumbar fusion, osteoporosis and seropositive (RF+, CCP+) erosive rheumatoid arthritis who presents for follow-up of rheumatoid arthritis.      Today, 8/25/2023: RA is controlled.  Hip pain is present from time to time but not interfering with her daily activities and is mild  when it occurs.  Low back pain is doing well also.  Biggest issue right now is poor sleep, where she wakes up several times at night, and has a Fitbit that tells her she is not entering REM sleep; has a CPAP that she uses for obstructive sleep apnea; she would like to be reevaluated in the sleep medicine clinic.    Denies fevers, chills, nausea, vomiting, constipation, diarrhea. No abdominal pain. No chest pain/pressure, palpitations, or shortness of breath. No oral or nasal sores.   No rash. No LE swelling.     Tobacco: quit in 1999  EtOH: 1 glass of wine every month at most  Drugs: None  Occupation: , retired     ROS   12 point review of system was completed and negative except as noted in the HPI     Active Problem List     Patient Active Problem List   Diagnosis    Rheumatoid arthritis involving right hand with positive rheumatoid factor (H)    History of colonic polyps    Multinodular goiter    Pulmonary nodule    PSEUDOPHAKIA OU    PVD (POSTERIOR VITREOUS DETACHMENT) OU    PXF (PSEUDOEXFOLIATION OF LENS CAPSULE) OD    GERD (gastroesophageal reflux disease)    Hyperlipidemia LDL goal <100    Advance Care Planning    Neuropathy    SHERRY (obstructive sleep apnea)-severe (AHI 35)    zEncounter for counseling    Anemia of chronic disease    Osteoporosis    Cornea guttata, ou    Conjunctival concretions    Stenosis, spinal, lumbar    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    MGD (meibomian gland dysfunction)    Prediabetes    Chronic bilateral low back pain without sciatica    Allergic state, subsequent encounter    Anxiety    DDD (degenerative disc disease), lumbar    Chronic right shoulder pain    Moderate episode of recurrent major depressive disorder (H)    Rheumatic mitral stenosis    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    History of bisphosphonate therapy    Morbid obesity (H)    Immunocompromised (H)    Thoracic spine pain    Other closed  intra-articular fracture of distal end of left radius, initial encounter    Closed fracture of shaft of left ulna, unspecified fracture morphology, initial encounter    Closed fracture of olecranon process of right ulna with routine healing     Past Medical History     Past Medical History:   Diagnosis Date    Acute posthemorrhagic anemia 10/13/2012    Closed fracture of multiple ribs of left side, initial encounter 2019    COPD (chronic obstructive pulmonary disease) (H)     no longer occurring    Ex-smoker 1999    Gastroenteritis 2020    Gastroenteritis with norovirus    Herniated nucleus pulposus, L3-4 3/1/2017    Hip joint replacement by other means 07/10/2008    History of blood transfusion     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    History of total knee replacement 2009    Menopause 1989    late 40's    Migraine 2014    resolved    Multinodular goiter     Other chronic pain     joints    Pelvic fracture (H) 2014    Rheumatic mitral stenosis     Rheumatoid arteritis (H)     S/P lumbar fusion 2017    Sleep apnea     Vitamin B12 deficiency      Past Surgical History     Past Surgical History:   Procedure Laterality Date    ABDOMEN SURGERY      c sections    ARTHROSCOPY KNEE Left     ARTHROSCOPY KNEE RT/LT  2006    left    BACK SURGERY  2013    disc    BIOPSY BREAST      BREAST SURGERY      lumpectomy 's    BREAST SURGERY      lumpectomy    CATARACT EXTRACTION Bilateral     CATARACT IOL, RT/LT Bilateral 2010 aproximately     SECTION  1966     SECTION  1972    COLONOSCOPY  2009,    COLONOSCOPY      FINGER SURGERY Right 2019    Procedure: DISTAL ULNA RESECTION, RIGHT MIDDLE SILASTIC METACARPALPHALANGEAL EXCHANGE AND RIGHT ELBOW NODULE EXCISION.;  Surgeon: Hilario Redmond MD;  Location: [facility] OR;  Service: Orthopedics    FOOT SURGERY Left     GYN SURGERY  [age]    HAND SURGERY Right     HAND SURGERY Right   "   HC ESOPH/GAS REFLUX TEST W NASAL IMPED >1 HR  02/01/2012    Procedure:ESOPHAGEAL IMPEDENCE FUNCTION TEST WITH 24 HOUR PH GREATER THAN 1 HOUR; Surgeon:KAYKAY JULIO; Location:[facility]    IR LUMBAR EPIDURAL STEROID INJECTION      IR TRANSLAMINAR EPIDURAL LUMBAR INJ INCL IMAGING  05/02/2012    LESI L5-S1 at [facility]    LUMBAR FUSION      OPEN REDUCTION INTERNAL FIXATION ELBOW Right 2/12/2023    Procedure: OPEN REDUCTION INTERNAL FIXATION, RIGHT OLECRANON FRACTURE;  Surgeon: Pili Brock MD;  Location:  OR    OPEN REDUCTION INTERNAL FIXATION WRIST Left 2/12/2023    Procedure: OPEN REDUCTION INTERNAL FIXATION, LEFT DISTAL RADIUS FRACTURE;  Surgeon: Pili Brock MD;  Location:  OR    OPTICAL TRACKING SYSTEM FUSION POSTERIOR SPINE LUMBAR N/A 04/03/2017    Procedure: OPTICAL TRACKING SYSTEM FUSION SPINE POSTERIOR LUMBAR ONE LEVEL;  Surgeon: Anthony Michele MD;  Location:  OR    ORTHOPEDIC SURGERY  1991    left foot surgery    ORTHOPEDIC SURGERY  1995    R MCP surgery    ORTHOPEDIC SURGERY  2007    right knee total replacement    TOTAL HIP ARTHROPLASTY Right     TOTAL KNEE ARTHROPLASTY Left     TOTAL KNEE ARTHROPLASTY Right     ZZC ANESTH,TOTAL HIP ARTHROPLASTY  2010    Rt hip    ZZC HAND/FINGER SURGERY UNLISTED  11/2005    right hand    ZZC TOTAL KNEE ARTHROPLASTY  2006    left     Allergy     Allergies   Allergen Reactions    Abatacept Other (See Comments)     Severe headaches  Migraine    Celebrex [Roche-2 Inhibitors]      Ineffective    Celecoxib Unknown and Nausea    Levaquin [Levofloxacin] Fatigue     Severe body pain    Orencia [Abatacept]      Headache    Septra [Bactrim]     Sulfa Antibiotics Nausea and Vomiting     \"deathly ill\"  Allergic to everything with Sulfa in it.    Sulfamethoxazole-Trimethoprim Nausea and Nausea and Vomiting    Tramadol Other (See Comments)     Headache  Migraine headache    Valdecoxib Unknown and Nausea     Made me \"very very ill\", might of been \"cramping\" "    Adhesive Tape Rash     Plastic tape  Plastic tapes    Antihistamines, Chlorpheniramine-Type  [Antihistamines, Chlorpheniramine-Type] Anxiety and Other (See Comments)     Current Medication List     Current Outpatient Medications   Medication Sig    acetaminophen (TYLENOL) 325 MG tablet Take 2 tablets (650 mg) by mouth every 6 hours as needed for mild pain Max tylenol 3000 mg in 24 hours    albuterol (PROAIR HFA/PROVENTIL HFA/VENTOLIN HFA) 108 (90 Base) MCG/ACT inhaler Inhale 2 puffs into the lungs every 6 hours as needed for shortness of breath, wheezing or cough    atorvastatin (LIPITOR) 40 MG tablet Take 1 tablet (40 mg) by mouth daily    busPIRone (BUSPAR) 10 MG tablet Take 1 tablet (10 mg) by mouth 2 times daily    CALCIUM CITRATE PO Take 300 mg by mouth daily Take with meal that contains least amount of calcium    carboxymethylcellulose PF (REFRESH PLUS) 0.5 % ophthalmic solution Place 1 drop into both eyes 2 times daily as needed for dry eyes    Cholecalciferol (VITAMIN D-3 PO) Take 2,000 Units by mouth daily    desvenlafaxine (PRISTIQ) 100 MG 24 hr tablet Take 1 tablet (100 mg) by mouth daily    Ferrous Gluconate 324 (37.5 Fe) MG TABS Take 1 tablet by mouth daily Started 6/2023    guaiFENesin (MUCINEX) 600 MG 12 hr tablet Take 1 tablet (600 mg) by mouth 2 times daily    hydrocortisone 2.5 % cream Apply topically 2 times daily For up to two weeks    ipratropium (ATROVENT) 0.06 % nasal spray 2 sprays in each nostril, 2-3 times per day as needed for rhinitis    leucovorin (WELLCOVORIN) 5 MG tablet Take 1 tablet (5 mg) by mouth every 7 days . Take 24 hours after taking Methotrexate each week.    loratadine (CLARITIN) 10 MG tablet Take 1 tablet (10 mg) by mouth 2 times daily    meclizine (ANTIVERT) 25 MG tablet Take 1 tablet (25 mg) by mouth 3 times daily as needed for dizziness    Methotrexate, PF, (RASUVO) 25 MG/0.5ML autoinjector Inject 0.5 mLs (25 mg) Subcutaneous every 7 days . Hold for signs of infection,  and seek medical attention. Take every Thursday.    naproxen sodium (ANAPROX) 220 MG tablet Take 2 tablets (440 mg) by mouth daily as needed for moderate pain (4-6) (less than monthly use currently) (Patient taking differently: Take 440 mg by mouth daily as needed for moderate pain (using a couple times a month))    olopatadine (PATADAY) 0.2 % ophthalmic solution Place 1 drop into both eyes daily as needed Uses mostly in Spring    pantoprazole (PROTONIX) 40 MG EC tablet Take 1 tablet (40 mg) by mouth every morning (before breakfast)    Upadacitinib ER (RINVOQ) 15 MG TB24 Take 15 mg by mouth daily    vitamin C (ASCORBIC ACID) 1000 MG TABS Take 1 tablet (1,000 mg) by mouth daily    zoledronic Acid (RECLAST) 5 MG/100ML SOLN infusion Inject 5 mg into the vein once Every year (next dose 2023)    sucralfate (CARAFATE) 1 GM tablet Take 1 tablet (1 g) by mouth 4 times daily as needed for nausea (reflux) TAKE ONE TABLET BY MOUTH FOUR TIMES A DAY AS NEEDED HEARTBURN (Patient not taking: Reported on 2023)    zinc gluconate 50 MG tablet Take 1 tablet (50 mg) by mouth daily (Patient not taking: Reported on 2023)     No current facility-administered medications for this visit.     Social History   See HPI    Family History     Family History   Problem Relation Age of Onset    Arthritis Mother     Alzheimer Disease Mother     Hyperlipidemia Mother     Osteoporosis Mother     Heart Disease Father         MI ( from this)    Alcohol/Drug Father     Arthritis Sister     Hypertension Sister     Other Cancer Sister         Luekemia    Cancer Son     Diabetes Son         type 1    Neurologic Disorder Sister         Schizophrenic    Hypertension Sister     Hyperlipidemia Sister     Mental Illness Sister     Diabetes Son     Other Cancer Son     Substance Abuse Son     Glaucoma Daughter     Diabetes Other         type 2    Hyperlipidemia Other      No change in family history since the previous clinic visit.    Physical  "Exam     Temp Readings from Last 3 Encounters:   08/02/23 98  F (36.7  C) (Oral)   06/23/23 98.1  F (36.7  C) (Tympanic)   06/20/23 98  F (36.7  C) (Temporal)     BP Readings from Last 5 Encounters:   08/02/23 116/72   06/23/23 111/68   06/20/23 (!) 143/78   06/05/23 130/67   05/11/23 (!) 142/76     Pulse Readings from Last 1 Encounters:   08/02/23 79     Resp Readings from Last 1 Encounters:   08/02/23 16     Estimated body mass index is 38.39 kg/m  as calculated from the following:    Height as of 8/2/23: 1.549 m (5' 1\").    Weight as of 8/2/23: 92.2 kg (203 lb 3.2 oz).      GEN: NAD.   HEENT: MMM.  Anicteric, noninjected sclera. No obvious external lesions of the ear and nose. Hearing intact.  PULM: No increased work of breathing  MSK:  Hands and wrists without swelling.  Unable to fully extend the right third PIP, unchanged for many years per patient  SKIN: No rash or jaundice seen  PSYCH: Alert. Appropriate.      Labs     CBC  Recent Labs   Lab Test 08/22/23  1005 06/20/23  1329 04/25/23  1008 08/18/21  1054 05/10/21  1023 02/16/21  1019 11/16/20  1028   WBC 4.2 4.8 5.2   < > 5.0 5.7 5.7   RBC 3.85 4.03 3.55*   < > 3.39* 3.81 3.91   HGB 11.4* 11.0* 10.1*   < > 11.3* 12.5 12.0   HCT 35.5 36.4 33.0*   < > 35.6 38.9 37.8   MCV 92 90 93   < > 105* 102* 97   RDW 19.0* 17.7* 17.1*   < > 14.3 15.2* 16.1*    397 419   < > 333 335 361   MCH 29.6 27.3 28.5   < > 33.3* 32.8 30.7   MCHC 32.1 30.2* 30.6*   < > 31.7 32.1 31.7   NEUTROPHIL 57 58 60   < > 57.7 65.8 60.5   LYMPH 25 28 21   < > 18.5 17.0 20.4   MONOCYTE 14 11 15   < > 17.3 12.1 12.8   EOSINOPHIL 3 3 3   < > 6.3 4.9 6.1   BASOPHIL 0 0 0   < > 0.2 0.2 0.2   ANEU  --   --   --   --  2.9 3.8 3.5   ALYM  --   --   --   --  0.9 1.0 1.2   MARYURI  --   --   --   --  0.9 0.7 0.7   AEOS  --   --   --   --  0.3 0.3 0.4   ABAS  --   --   --   --  0.0 0.0 0.0   ANEUTAUTO 2.4 2.7 3.1   < >  --   --   --    ALYMPAUTO 1.0 1.4 1.1   < >  --   --   --    AMONOAUTO 0.6 0.5 " 0.8   < >  --   --   --    AEOSAUTO 0.1 0.2 0.2   < >  --   --   --    ABSBASO 0.0 0.0 0.0   < >  --   --   --     < > = values in this interval not displayed.     CMP  Recent Labs   Lab Test 08/22/23  1005 06/20/23  1329 04/25/23  1008 02/22/23  1018 02/12/23  1633 08/18/21  1054 05/10/21  1023 03/18/21  1350 02/16/21  1019 12/18/20  0923 11/16/20  1028   NA  --  142  --  142 136   < >  --   --   --   --   --    POTASSIUM  --  3.5  --  4.0 3.8   < >  --   --   --   --   --    CHLORIDE  --  105  --  103 102   < >  --   --   --   --   --    CO2  --  24  --  25 25   < >  --   --   --   --   --    ANIONGAP  --  13  --  14 9   < >  --   --   --   --   --    GLC  --  123*  --  104* 159*   < >  --   --   --   --   --    BUN  --  9.6  --  11.0 10.3   < >  --   --   --   --   --    CR 0.63 0.60 0.60 0.52 0.52   < > 0.59  --  0.62  --  0.60   GFRESTIMATED 89 90 90 >90 >90   < > 87  --  86  --  87   GFRESTBLACK  --   --   --   --   --   --  >90  --  >90  --  >90   BRANDEE  --  9.8 9.8 9.4 8.6*   < >  --    < >  --    < >  --    BILITOTAL 0.4 0.3 0.2  --   --    < > 0.3  --  0.4  --  0.4   ALBUMIN 4.6 4.6 4.5  --   --    < > 3.6  --  4.2  --  4.1   PROTTOTAL 7.8 8.1 7.7  --   --    < > 7.0  --  8.0  --  7.6   ALKPHOS 37 50 53  --   --    < > 45  --  44  --  47   AST 30 29 29  --   --    < > 22  --  21  --  25   ALT 21 21 22  --   --    < > 36  --  34  --  36    < > = values in this interval not displayed.     Calcium/VitaminD  Recent Labs   Lab Test 06/20/23  1329 06/05/23  0941 04/25/23  1008 02/22/23  1018 02/11/23  0819 01/16/23  1122   BRANDEE 9.8  --  9.8 9.4   < > 9.2   VITDT  --  60 66  --   --  95*    < > = values in this interval not displayed.     ESR/CRP  Recent Labs   Lab Test 08/22/23  1005 04/25/23  1008 01/16/23  1122 07/12/22  1112 04/29/22  1000 02/04/22  0832 12/08/21  1053   SED 16 30 27   < > 15 14 16   CRP  --   --   --   --  <2.9 <2.9 <2.9   CRPI <3.00 <3.00 <3.00   < >  --   --   --     < > = values in this  interval not displayed.     Lipid Panel  Recent Labs   Lab Test 01/16/23  1122 12/08/21  1053 11/27/20  1043   CHOL 185 184 188   TRIG 124 101 142   HDL 74 77 89   LDL 86 87 71   NHDL 111 107 99     Hepatitis B  Recent Labs   Lab Test 10/20/22  1256 09/15/15  1159   AUSAB  --  0.11   HBCAB Nonreactive Nonreactive   HEPBANG Nonreactive Nonreactive     Hepatitis C  Recent Labs   Lab Test 10/20/22  1256 09/15/15  1159   HCVAB Nonreactive Nonreactive   Assay performance characteristics have not been established for newborns,   infants, and children       Tuberculosis Screening  Recent Labs   Lab Test 02/16/23  0000 10/20/22  1256 08/18/21  1054 05/07/18  1033 09/15/15  1200   TBRES Indeterminate* Negative Negative  --   --    TBRSLT  --   --   --  Negative Negative   TBAGN  --   --   --  0.00 0.00     Immunization History     Immunization History   Administered Date(s) Administered    COVID-19 MONOVALENT 12+ (Pfizer) 02/09/2021, 03/03/2021    FLU 6-35 months 10/24/2006, 11/14/2007, 10/22/2008, 10/21/2009    Flu, Unspecified 10/20/2013    Influenza (High Dose) 3 valent vaccine 10/28/2013, 09/23/2014, 11/11/2015, 09/23/2016, 09/24/2019    Influenza (IIV3) PF 10/12/1999, 10/26/2001, 10/19/2002, 10/30/2003, 10/28/2004, 10/21/2005, 10/26/2007, 10/21/2009, 10/05/2011, 10/13/2012    Influenza Vaccine 65+ (Fluzone HD) 10/07/2021, 11/16/2022    Influenza Vaccine >6 months (Alfuria,Fluzone) 09/24/2020    Influenza Vaccine Im 4yrs+ 4 Valent CCIIV4 09/28/2017, 09/27/2018    Influenza Vaccine, 6+MO IM (QUADRIVALENT W/PRESERVATIVES) 11/16/2022    Mantoux Tuberculin Skin Test 11/03/2006    Pneumo Conj 13-V (2010&after) 07/29/2015    Pneumococcal 23 valent 06/26/2000, 10/26/2001, 06/01/2007, 06/02/2010    TD,PF 7+ (Tenivac) 12/10/2008, 07/20/2009    Tdap (Adult) Unspecified Formulation 12/12/2018    Zoster recombinant adjuvanted (SHINGRIX) 12/12/2018, 02/14/2019          Chart documentation done in part with Dragon Voice recognition  Software. Although reviewed after completion, some word and grammatical error may remain.      Video-Visit Details    Type of service:  Video Visit    Video Start Time: 11:12 AM    Video End Time: 11:18 AM    Originating Location (pt. Location): Home, [place]    Distant Location (provider location):  off site, [place]    Platform used for Video Visit: Gordo Byers MD

## 2023-09-13 ENCOUNTER — PATIENT OUTREACH (OUTPATIENT)
Dept: CARE COORDINATION | Facility: CLINIC | Age: 83
End: 2023-09-13
Payer: COMMERCIAL

## 2023-09-13 NOTE — LETTER
[facility] COORDINATION  [address]  [facility] [address]     September 13, 2023        Ginger MONTEMAYOR Joanna  [address]  [facility] [address]          Dear Ginger,     Court is an updated Patient Centered Plan of Care for your continued enrollment in Care Coordination. Please let us know if you have additional questions, concerns, or goals that we can assist with.    Sincerely,    Lucille Awad RN Care Coordinator  [facility] [place], [place], [place]  Email: [email or web address]  Phone: [phone]            [facility]  Patient Centered Plan of Care  About Me:      Patient Name:  Ginger Marshall    YOB: 1940  Age:         83 year old   [place] MRN:    [id number] Telephone Information:  Home Phone [phone]   Mobile [phone]   Home Phone [phone]       Address:  [address]  [facility] [address] Email address:  [email or web address]      Emergency Contact(s)    Name Relationship Lgl Grd Work Phone Home Phone Mobile Phone   1HARRY WILLIS Daughter No  [phone]            Primary language:  English     needed? No   [place] [employer]:  [phone] op. 1  Other communication barriers:Glasses    Preferred Method of Communication:  Rosina  Current living arrangement: I live alone    Mobility Status/ Medical Equipment: Independent w/Device    Health Maintenance  Health Maintenance Reviewed: Due/Overdue   Health Maintenance Due   Topic Date Due    URINE DRUG SCREEN  Never done    EYE EXAM  08/18/2021    A1C  08/12/2023    INFLUENZA VACCINE (1) 09/01/2023     My Access Plan  Medical Emergency 911   Primary Clinic Line [facility] - [phone]   24 Hour Appointment Line [phone] or  [phone]) (toll-free)   24 Hour Nurse Line [phone] (toll-free)   Preferred Urgent Care [facility], [phone]     [facility]  [facility]  [phone]     Preferred Pharmacy [facility] - [place], [place] - [address]     Behavioral Health Crisis Line The National Suicide Prevention Lifeline at 1-843.718.3962 or Text/Call 988     My Care Team Members  Patient Care Team         Relationship Specialty Notifications Start End    Duy Leyva MD PCP - General Family Practice  11/16/22     Phone: [phone] Fax: [phone]         [address] [place] [address]    Nico Byers MD Assigned Rheumatology Provider   11/21/21     Phone: [phone] Fax: [phone] [address] [facility] [address]    Annmarie Smith MD Hospitalist Endocrinology, Diabetes, and Metabolism  2/10/22     Phone: [phone] Pager: [phone]         [address] [facility] [address] [facility] [address]    Annmarie Smith MD Assigned Endocrinology Provider   4/24/22     Phone: [phone] Pager: [phone] Fax: [phone]        [address] [place] [address] [address] [facility] [address]    Sonu Lemons DPM Referring Physician Podiatry  7/11/22     referred to derm    Phone: [phone] Fax: [phone]         [address] [facility] [address] [facility] [address]    Diya Joseph PA-C Physician Assistant Dermatology  7/11/22     Phone: [phone] Fax: [phone]         [address] [facility] [address]    Duy Leyva MD Assigned PCP   8/20/22     Phone: [phone] Fax: [phone]         [address] [place] [address]    Margaux Harley [facility] Pharmacist Pharmacist  12/19/22     Phone: [phone] Fax: [phone] [address] [facility] [address]    Cristo, Sonu Johnny, DPM Assigned Surgical Provider   2/4/23     Phone: [phone] Fax: [phone] [address] [facility] [address] [place] [address]    Margaux Harley RPH Assigned MTM Pharmacist   2/4/23     Phone: [phone] Fax: [phone] [address] [facility]  [address]    Bernabe Munoz DPM Assigned Musculoskeletal Provider   5/6/23     Phone: [phone] Fax: [phone] [address] [facility] [address] [facility] [address]    Lucille Awad, RN Lead Care Coordinator  Admissions 6/6/23     Phone: [phone]         [facility] Worker   6/8/23     [place]    Phone: [phone]         [facility] Worker  Admissions 7/12/23     Phone: [phone]                   My Care Plans  Self Management and Treatment Plan  Care Plan  Care Plan: Specialty Referral       Problem: Patient is in need of specialty care       Long-Range Goal: Establish consistent relationship with specialist(s) as needed       Start Date: 6/8/2023 Expected End Date: 5/31/2024    Recent Progress: 60%    Note:     Barriers: diagnosis of multiple, chronic, complex medical conditions, provider availability - wait time to complete appointments, etc.   Strengths: motivated, engaged in care coordination.   Patient expressed understanding of goal: yes  Action steps to achieve this goal:  1. I will follow up with my providers as scheduled/recommended.   - TCO: 06/05/23 - follow up scheduled as recommended 6 weeks, date unknown  - Bone stimulator pending  - Mental Health: 09/26/23   - Eye exam: Scheduled end of summer - date unknown  - MTM Pharmacy: 7/31/23 - follow up recommended 6 months; 01/2024 TBD  - Endocrinology: 08/02/23 - biopsy 08/10/23 - needs to schedule follow up TBD  - Rheumatology: 08/25/23 - follow up scheduled 12/01/23  - Sleep: 01/24/2024  - Primary Care Provider 02/28/2024  - Dexa: 02/06/24  - Podiatry 05/11/23 - declines to follow up at this time.   - Physical Therapy: TBD  2. I will discuss, review, schedule and complete recommended overdue health maintenance with my Primary Care Provider.   3. I will contact my care team with questions, concerns, support needs. I will use the clinic as a resource and I understand I can contact  my clinic with 24/7 after hours services available. Care Coordinator will remain available as needed.      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   Patient expressed understanding of goal: Yes  Action steps to achieve this goal:  1. I will review the following resources: (completed)  Hot Meals:   Easter by the [facility]                          [address], [place] [address]  Note: Please do NOT go to the building located at [address]. Loaves & Fishes meals are served at the corner of [address] and [address].     Meals Served: Monday - Thursday from 5:30 - 6:30 PM  Service Location: Meals served at the [facility] front doors (2 white double doors)  [employer] - [phone]            [address] [facility]. [facility], [address]  2nd Thursday of each month - curbside available. 5:30-6:30 pm     [facility] - [phone]                        [address] [place] [address]  Wednesdays, 5:45-6:25 pm during school year only.      [place] [employer] - [phone]        [address] [place] [address]  Every 3rd and 4th Thursday, 5:00-6:00 pm.                    Curbside only due to COVID-19.  Those wishing a meal should enter the lower parking lot and follow directions. Meals are picked up by Door 1.     [facility] - [phone]     [address] [place], [place]   Drive Through Meals: Monday 5:30-6:30 pm     [facility] -  [phone]  [address], [place] [address]  Monday thru Thursday, 5:30-6:30 pm. Curbside only due to COVID-19.     Food Pantries:   [facility] [phone] [address], [place], [place], [address]   Partners with local charities and offers the [address] [place] [address], [facility] Food Shelves      [facility] - [phone] [address] [place] [address] Hours: Tue 10:20 AM - 3:40 PM , Thu 10:20 AM - 3:40 PM  Fees: Free       The main service they offer is [employer] food shelf. They also run several federal government USDA supported programs in [facility]. This is run in partnership with [address] [place] [address] and other local social service agencies.      [facility] - [facility] - [phone]  [address] [facility] [address] [place] [address]    Hours: Tue 11:30 AM - 12:30 PM  Fees: Self Pay       [facility] - [phone] [address] [place] [address]   Hours: Mon - Wed 10:00 AM - 3:00 PM , Tue 5:30 PM - 7:30 PM , Thu 5:30 PM - 7:30 PM , Thu - Fri 10:00 AM - 12:00 PM  Fees: Free       [facility]  [phone]   Serves families in [facility]. Programs can provide food, meals, and other non-financial support.      [facility] [phone]   [address] [place], [place], [address]   This location operates a food shelf and other social service programs. Assistance offered is extensive, and includes personal hygiene supplies, household products, laundry and household . Or speak to a  and apply for other resources such as food stamps, [facility] welfare, or more.      A second location of the [facility] is located at [address] [place], [place], [address]. They offer many of the same programs and resources as indicated above.      [address] [place] [address]  [facility] Shelf - [phone] [address] [place] [address] Hours: Tue 10:00 PM - 3:00 PM Appt. Only, Thu 12:00 PM - 6:00 PM Appt. Only  Fees: Free  (7/24/23 - going to check into this resource)     [facility]  - [phone] [address] [place] [address]  Hours: Sat 11:30 PM - 12:30 PM  Fees: Free       [facility] [phone]   [address] [place], [place], [address]   The non-profit offers Free Food for seniors and other income qualified elderly and residents. The main resource they offer from this facility is the Nutrition Assistance for Seniors (NAPS) supplemental food program. Government commodities, vouchers, and other food assistance is provided     [facility] [phone] [address] [place], [address]      Critical access hospital - [facility] - [phone] [address] [place] [address]  Hours: Tue 1:00 PM - 3:30 PM  Fees: Free    [address] [facility] Coord[facility]   [address] [place] [address]  Fees: Free [phone]   Hours: Mon - Thu 9:00 AM - 4:00 PM      [facility]   [address]  [place] [address]  Fees: Free [phone]  Hours: Mon 4:00 PM - 6:00 PM Tue 11:00 AM - 1:00 PM , Wed 4:00 PM - 6:00 PM , Thu 10:30 AM - 12:30 PM, 2-3 PM      [facility] [address] [place] [address]  Fees: Free [phone]  Hours: Tue - Sat 10:00 AM - 6:00 PM      [place] the [facility] - [phone] Ext. [phone]   [address] [place], [place] [address]Mldui: Tue 10:00 AM - 12:00 PM  Fees: Free      Additional food pantries operate in [facility] [phone]   The centers may offer groceries, hot meals, and special seasonal programs such as summer snacks for students or free [place] and Thanksgiving meals      2. I will sign up for [employer] program and is utilizing $80 per month at [facility] [employer]  location in her area (completed)                                 Action Plans on File:            Depression          Advance Care Plans/Directives Type:   Advanced Directive - On File; POLST      My Medical and Care Information  Problem List   Patient Active Problem List   Diagnosis    Rheumatoid arthritis involving right hand with positive rheumatoid factor (H)    History of colonic polyps    Multinodular goiter    Pulmonary nodule    PSEUDOPHAKIA OU    PVD (POSTERIOR VITREOUS DETACHMENT) OU    PXF (PSEUDOEXFOLIATION OF LENS CAPSULE) OD    GERD (gastroesophageal reflux disease)    Hyperlipidemia LDL goal <100    Advance Care Planning    Neuropathy    SHERRY (obstructive sleep apnea)-severe (AHI 35)    zEncounter for counseling    Anemia of chronic disease    Osteoporosis    Cornea guttata, ou    Conjunctival concretions    Stenosis, spinal, lumbar    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    MGD (meibomian gland dysfunction)    Prediabetes    Chronic bilateral low back pain without sciatica    Allergic state, subsequent encounter    Anxiety    DDD (degenerative disc disease), lumbar    Chronic right shoulder pain    Moderate episode of recurrent major depressive disorder (H)    Rheumatic mitral stenosis    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    History of bisphosphonate therapy    Morbid obesity (H)    Immunocompromised (H)    Thoracic spine pain    Other closed intra-articular fracture of distal end of left radius, initial encounter    Closed fracture of shaft of left ulna, unspecified fracture morphology, initial encounter    Closed fracture of olecranon process of right ulna with routine healing      Current Medications and Allergies:    Allergies   Allergen Reactions    Abatacept Other (See Comments)     Severe headaches  Migraine    Celebrex [Roche-2 Inhibitors]      Ineffective    Celecoxib Unknown and Nausea    Levaquin [Levofloxacin] Fatigue     Severe body pain     "Orencia [Abatacept]      Headache    Septra [Bactrim]     Sulfa Antibiotics Nausea and Vomiting     \"deathly ill\"  Allergic to everything with Sulfa in it.    Sulfamethoxazole-Trimethoprim Nausea and Nausea and Vomiting    Tramadol Other (See Comments)     Headache  Migraine headache    Valdecoxib Unknown and Nausea     Made me \"very very ill\", might of been \"cramping\"    Adhesive Tape Rash     Plastic tape  Plastic tapes    Antihistamines, Chlorpheniramine-Type  [Antihistamines, Chlorpheniramine-Type] Anxiety and Other (See Comments)      Current Outpatient Medications:     acetaminophen (TYLENOL) 325 MG tablet, Take 2 tablets (650 mg) by mouth every 6 hours as needed for mild pain Max tylenol 3000 mg in 24 hours, Disp: , Rfl:     albuterol (PROAIR HFA/PROVENTIL HFA/VENTOLIN HFA) 108 (90 Base) MCG/ACT inhaler, Inhale 2 puffs into the lungs every 6 hours as needed for shortness of breath, wheezing or cough, Disp: , Rfl:     atorvastatin (LIPITOR) 40 MG tablet, Take 1 tablet (40 mg) by mouth daily, Disp: 90 tablet, Rfl: 1    busPIRone (BUSPAR) 10 MG tablet, Take 1 tablet (10 mg) by mouth 2 times daily, Disp: 180 tablet, Rfl: 1    CALCIUM CITRATE PO, Take 300 mg by mouth daily Take with meal that contains least amount of calcium, Disp: , Rfl:     carboxymethylcellulose PF (REFRESH PLUS) 0.5 % ophthalmic solution, Place 1 drop into both eyes 2 times daily as needed for dry eyes, Disp: , Rfl:     Cholecalciferol (VITAMIN D-3 PO), Take 2,000 Units by mouth daily, Disp: , Rfl:     desvenlafaxine (PRISTIQ) 100 MG 24 hr tablet, Take 1 tablet (100 mg) by mouth daily, Disp: 90 tablet, Rfl: 1    Ferrous Gluconate 324 (37.5 Fe) MG TABS, Take 1 tablet by mouth daily Started 6/2023, Disp: , Rfl:     guaiFENesin (MUCINEX) 600 MG 12 hr tablet, Take 1 tablet (600 mg) by mouth 2 times daily, Disp: 20 tablet, Rfl: 0    hydrocortisone 2.5 % cream, Apply topically 2 times daily For up to two weeks, Disp: 20 g, Rfl: 0    ipratropium " (ATROVENT) 0.06 % nasal spray, 2 sprays in each nostril, 2-3 times per day as needed for rhinitis, Disp: 15 mL, Rfl: 1    leucovorin (WELLCOVORIN) 5 MG tablet, Take 1 tablet (5 mg) by mouth every 7 days . Take 24 hours after taking Methotrexate each week., Disp: 13 tablet, Rfl: 2    loratadine (CLARITIN) 10 MG tablet, Take 1 tablet (10 mg) by mouth 2 times daily, Disp: , Rfl:     meclizine (ANTIVERT) 25 MG tablet, Take 1 tablet (25 mg) by mouth 3 times daily as needed for dizziness, Disp: 20 tablet, Rfl: 0    Methotrexate, PF, (RASUVO) 25 MG/0.5ML autoinjector, Inject 0.5 mLs (25 mg) Subcutaneous every 7 days . Hold for signs of infection, and seek medical attention. Take every Thursday., Disp: 2 mL, Rfl: 6    naproxen sodium (ANAPROX) 220 MG tablet, Take 2 tablets (440 mg) by mouth daily as needed for moderate pain (4-6) (less than monthly use currently) (Patient taking differently: Take 440 mg by mouth daily as needed for moderate pain (using a couple times a month)), Disp: , Rfl:     olopatadine (PATADAY) 0.2 % ophthalmic solution, Place 1 drop into both eyes daily as needed Uses mostly in Spring, Disp: , Rfl:     pantoprazole (PROTONIX) 40 MG EC tablet, Take 1 tablet (40 mg) by mouth every morning (before breakfast), Disp: 90 tablet, Rfl: 3    sucralfate (CARAFATE) 1 GM tablet, Take 1 tablet (1 g) by mouth 4 times daily as needed for nausea (reflux) TAKE ONE TABLET BY MOUTH FOUR TIMES A DAY AS NEEDED HEARTBURN (Patient not taking: Reported on 6/26/2023), Disp: , Rfl:     upadacitinib ER (RINVOQ) 15 MG tablet, Take 1 tablet (15 mg) by mouth daily, Disp: 30 tablet, Rfl: 6    vitamin C (ASCORBIC ACID) 1000 MG TABS, Take 1 tablet (1,000 mg) by mouth daily, Disp: , Rfl:     zinc gluconate 50 MG tablet, Take 1 tablet (50 mg) by mouth daily (Patient not taking: Reported on 7/31/2023), Disp: , Rfl:     zoledronic Acid (RECLAST) 5 MG/100ML SOLN infusion, Inject 5 mg into the vein once Every year (next dose March 2023),  Disp: , Rfl:      Care Coordination Start Date: 6/5/2023   Frequency of Care Coordination: monthly     Form Last Updated: 09/13/2023

## 2023-09-13 NOTE — PROGRESS NOTES
Clinic Care Coordination Contact  Care Coordination Clinician Chart review    Situation: Patient chart reviewed by care coordinator.       Background: Care Coordination initial assessment and enrollment took place 6/5/2023.   Upon initial assessment patient-centered goals were discussed and developed with participation from patient.  RNCC handed patient follow up and monitoring of goal progression off to CHW for continued outreach every 30 days.        Assessment: Per chart review, patient outreach completed by CC CHW on 08/22/2023.  Patient is actively working to accomplish goal(s) and patient's goal(s) remain(s) appropriate and relevant at this time.       Care Plan: Specialty Referral       Problem: Patient is in need of specialty care       Long-Range Goal: Establish consistent relationship with specialist(s) as needed       Start Date: 6/8/2023 Expected End Date: 5/31/2024    Recent Progress: 60%    Note:     Barriers: diagnosis of multiple, chronic, complex medical conditions, provider availability - wait time to complete appointments, etc.   Strengths: motivated, engaged in care coordination.   Patient expressed understanding of goal: yes  Action steps to achieve this goal:  1. I will follow up with my providers as scheduled/recommended.   - TCO: 06/05/23 - follow up scheduled as recommended 6 weeks, date unknown  - Bone stimulator pending  - Mental Health: 09/26/23   - Eye exam: Scheduled end of summer - date unknown  - MTM Pharmacy: 7/31/23 - follow up recommended 6 months; 01/2024 TBD  - Endocrinology: 08/02/23 - biopsy 08/10/23 - needs to schedule follow up TBD  - Rheumatology: 08/25/23 - follow up scheduled 12/01/23  - Sleep: 01/24/2024  - Primary Care Provider 02/28/2024  - Dexa: 02/06/24  - Podiatry 05/11/23 - declines to follow up at this time.   - Physical Therapy: TBD  2. I will discuss, review, schedule and complete recommended overdue health maintenance with my Primary Care Provider.   3. I will  contact my care team with questions, concerns, support needs. I will use the clinic as a resource and I understand I can contact my clinic with 24/7 after hours services available. Care Coordinator will remain available as needed.      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   Patient expressed understanding of goal: Yes  Action steps to achieve this goal:  1. I will review the following resources: (completed)  Hot Meals:   Easter by the [facility]                          [address] [place] [address]  Note: Please do NOT go to the building located at [address]. Loaves & Fishes meals are served at the corner of [address] and [address].     Meals Served: Monday - Thursday from 5:30 - 6:30 PM  Service Location: Meals served at the [facility] front doors (2 white double doors)  [employer] - [phone]            [address] [facility]. [facility], [address]  2nd Thursday of each month - curbside available. 5:30-6:30 pm     [facility] - [phone]                        [address] [place] [address]  Wednesdays, 5:45-6:25 pm during school year only.      [place], [facility] of the [place] [facility] - [phone]        [address] [facility] [address]  Every 3rd and 4th Thursday, 5:00-6:00 pm.                    Curbside only due to COVID-19.  Those wishing a meal should enter the lower parking lot and follow directions. Meals are picked up by Door 1.     [facility] - [phone]      [address] [facility], [place], [place]   Drive Through Meals: Monday 5:30-6:30 pm     [facility] - [phone]  [address], [place] [address]  Monday thru Thursday, 5:30-6:30 pm. Curbside only due to COVID-19.     Food Pantries:   [facility] [phone]   [address], [place], [place], [address]   Partners with local charities and offers the [address] [place] [address], formerly [facility] [employer] Food Shelves      [place] [facility] the [facility] - [phone] [address] [place] [address] Hours: Tue 10:20 AM - 3:40 PM , Thu 10:20 AM - 3:40 PM  Fees: Free       The main service they offer is [employer] food shelf. They also run several federal government USDA supported programs in [facility]. This is run in partnership with [address] [place] [address] and other local social service agencies.      [facility] - [phone]  [address] [place] [address]    Hours: Tue 11:30 AM - 12:30 PM  Fees: Self Pay       [facility] - [phone] [address] [place] [address]   Hours: Mon - Wed 10:00 AM - 3:00 PM , Tue 5:30 PM - 7:30 PM , Thu 5:30 PM - 7:30 PM , Thu - Fri 10:00 AM - 12:00 PM  Fees: Free       [facility]  [phone]   Serves families in [facility]. Programs can provide food, meals, and other non-financial support.      [place] [facility] [phone] I   [address] [place], [place], [address]   This location operates a food shelf and other social service programs. Assistance offered is extensive, and includes personal hygiene supplies, household products, laundry and household . Or speak to a  and apply for other resources such as food stamps, TAN welfare, or more.      A second location of the [place] [facility] is located at [address] [place], [place] [address]Anh ramsey  [place], [address]. They offer many of the same programs and resources as indicated above.      [address] [facility] Food Shelf - [phone] [address] [place] [address] Hours: Tue 10:00 PM - 3:00 PM Appt. Only, Thu 12:00 PM - 6:00 PM Appt. Only  Fees: Free  (7/24/23 - going to check into this resource)     [facility]  - [phone] [address] [place] [address]  Hours: Sat 11:30 PM - 12:30 PM  Fees: Free       [facility] [phone]   [address] [place], [place], [address]   The non-profit offers Free Food for seniors and other income qualified elderly and residents. The main resource they offer from this facility is the Nutrition Assistance for Seniors (NAPS) supplemental food program. Government commodities, vouchers, and other food assistance is provided     [place] [employer] [facility] [phone] [address] [place], [place], [address]      [facility] [employer] - [phone] [address] [place] [address]  Hours: Tue 1:00 PM - 3:30 PM  Fees: Free    [address] [place] [address] - Coordinated Entry   [address] [place] [address]  Fees: Free [phone]   Hours: Mon - Thu 9:00 AM - 4:00 PM      [facility]   [address] [place] [address]  Fees: Free [phone]  Hours: Mon 4:00 PM - 6:00 PM Tue 11:00 AM - 1:00 PM , Wed 4:00 PM - 6:00 PM , Thu 10:30 AM - 12:30 PM, 2-3 PM      [facility] [address] [place] [address]  Fees: Free [phone]  Hours: Tue - Sat 10:00 AM - 6:00 PM      [place], [facility] the [employer] - [phone] Ext. [phone]   [address] [place], [place] [address]Cgtyv: Tue 10:00 AM - 12:00 PM  Fees: Free      Additional food pantries operate in [facility] [phone]   The centers may offer groceries, hot meals, and special seasonal programs such as summer snacks for students or free [place] and  Thanksgiving meals      2. I will sign up for [employer] program and is utilizing $80 per month at [facility] location in her area (completed)                                Plan/Recommendations: RNCC Clinical Assessments to be completed annually or as needed. Annual Assessment will be due 06/2024. The patient will continue working with Care Coordination to achieve goal(s) as above.  CHW will involve RNCC as needed or if patient is ready to transition to goal status of Maintenance.  RNCC will continue to review progress to goal(s) and CHW outreaches every 6 weeks.     Complex Care Plan:  Patient is due for updated Plan of Care.  Care Plan updated and sent to patient: Yes, via Rosina Awad RN Care Coordinator  [facility]  Email: [email or web address]  Phone: [phone]

## 2023-09-21 ENCOUNTER — PATIENT OUTREACH (OUTPATIENT)
Dept: CARE COORDINATION | Facility: CLINIC | Age: 83
End: 2023-09-21
Payer: COMMERCIAL

## 2023-09-21 NOTE — PROGRESS NOTES
Clinic Care Coordination Contact  Community Health Worker Follow Up    Care Gaps: Eye exam is scheduled for 10/3/23. Pt plans to get her flu shot the beginning of Nov 2023. Telephone message from Liane Ayers RN, states pt can get A1C checked at 2/28/24 Medicare Annual Wellness Visit. Pt received the first couple of COVID vaccines but will not get further COVID vaccines.      Health Maintenance Due   Topic Date Due    URINE DRUG SCREEN  Never done    EYE EXAM  08/18/2021    A1C  08/12/2023    INFLUENZA VACCINE (1) 09/01/2023       Care Gap Goal set: Yes    Care Plan:   Care Plan: Specialty Referral       Problem: Patient is in need of specialty care       Long-Range Goal: Establish consistent relationship with specialist(s) as needed       Start Date: 6/8/2023 Expected End Date: 5/31/2024    This Visit's Progress: 70% Recent Progress: 60%    Note:     Barriers: diagnosis of multiple, chronic, complex medical conditions, provider availability - wait time to complete appointments, etc.   Strengths: motivated, engaged in care coordination.   Patient expressed understanding of goal: yes  Action steps to achieve this goal:  1. I will follow up with my providers as scheduled/recommended.   - TCO: 06/05/23 - follow up scheduled as recommended 6 weeks, date unknown  - Bone stimulator pending  - Mental Health: 09/26/23   - Eye exam: Scheduled end of summer - 10/3/23 per pt  - MTM Pharmacy: 7/31/23 - follow up recommended 6 months; 01/2024 TBD  - Endocrinology: 08/02/23 - biopsy 08/10/23 - needs to schedule follow up TBD  - Rheumatology: 08/25/23 - follow up scheduled 12/01/23  - Sleep: 01/24/2024  - Primary Care Provider 02/28/2024  - Dexa: 02/06/24  - Podiatry 05/11/23 - declines to follow up at this time.   - Physical Therapy: TBD  2. I will discuss, review, schedule and complete recommended overdue health maintenance with my Primary Care Provider.   3. I will contact my care team with questions,  concerns, support needs. I will use the clinic as a resource and I understand I can contact my clinic with 24/7 after hours services available. Care Coordinator will remain available as needed.      Goal updated 9/21/23                                Intervention and Education during outreach:   Pt reports ongoing pain in her right hip. Schedule for MRI of right hip 10/9/23. 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 The pain limits how far patients walks. As the summer has progressed, she could not walk as far around the park with her dog.  Pt states she is using [employer] for $80/mo off her grocery shopping at [facility] and she grocery shops at another food shelf. The savings on her grocery bills allows her to pay her other bills.   Pt states she is able to pay her bills at the present time. She will apply for energy assistance from [employer] again this year.  Pt has a dog with diabetes. She received a $1000 asa through the [employer] to assist in paying for the dog's syringes and insulin.     CHW asks if pt has any further concerns or need for resources as this time. Pt states she does not. CHW made sure pt has CHW contact information. Pt will contact CHW with questions or updates before next outreach.     CHW Plan: CHW will follow up with pt in one month.    Melissa Mckeon  Community Health Worker  [facility]  [phone]

## 2023-09-26 ENCOUNTER — VIRTUAL VISIT (OUTPATIENT)
Dept: PSYCHOLOGY | Facility: CLINIC | Age: 83
End: 2023-09-26
Payer: COMMERCIAL

## 2023-09-26 DIAGNOSIS — F33.1 MODERATE EPISODE OF RECURRENT MAJOR DEPRESSIVE DISORDER (H): Primary | ICD-10-CM

## 2023-09-26 PROCEDURE — 90834 PSYTX W PT 45 MINUTES: CPT | Mod: VID

## 2023-09-26 ASSESSMENT — PATIENT HEALTH QUESTIONNAIRE - PHQ9
10. IF YOU CHECKED OFF ANY PROBLEMS, HOW DIFFICULT HAVE THESE PROBLEMS MADE IT FOR YOU TO DO YOUR WORK, TAKE CARE OF THINGS AT HOME, OR GET ALONG WITH OTHER PEOPLE: SOMEWHAT DIFFICULT
SUM OF ALL RESPONSES TO PHQ QUESTIONS 1-9: 4
SUM OF ALL RESPONSES TO PHQ QUESTIONS 1-9: 4

## 2023-09-26 NOTE — PROGRESS NOTES
[facility]                                     Progress Note    Patient Name: Ginger Marshall  Date: 9/26/23         Service Type: Individual      Session Start Time: 10:00 am  Session End Time: 10:45 am     Session Length: 45 min    Session #: 9Answers submitted by the patient for this visit:  Patient Health Questionnaire (Submitted on 9/26/2023)  If you checked off any problems, how difficult have these problems made it for you to do your work, take care of things at home, or get along with other people?: Somewhat difficult  PHQ9 TOTAL SCORE: 4      Attendees: Client attended alone    Service Modality:  Video Visit:      Provider verified identity through the following two step process.  Patient provided:  Patient is known previously to provider    Telemedicine Visit: The patient's condition can be safely assessed and treated via synchronous audio and visual telemedicine encounter.      Reason for Telemedicine Visit: Patient convenience (e.g. access to timely appointments / distance to available provider)    Originating Site (Patient Location): Patient's home    Distant Site (Provider Location): Provider Remote Setting- Home Office    Consent:  The patient/guardian has verbally consented to: the potential risks and benefits of telemedicine (video visit) versus in person care; bill my insurance or make self-payment for services provided; and responsibility for payment of non-covered services.     Patient would like the video invitation sent by:  My Chart    Mode of Communication:  Video Conference [facility]    Distant Location (Provider):  Off-site    As the provider I attest to compliance with applicable laws and regulations related to telemedicine.    DATA  Interactive Complexity: No     Crisis: No      Progress Since Last Session (Related to Symptoms / Goals / Homework):   Symptoms: No change continued depression , low energy    Homework: Partially completed      Episode of Care Goals:  Minimal progress - ACTION (Actively working towards change); Intervened by reinforcing change plan / affirming steps taken     Current / Ongoing Stressors and Concerns:  Low motivation regarding household chores, cognitive barriers to seeking more resources. Low quality sleep, even with CPAP-waking up tired, nap in afternoon. Focus on socializing with various Spiritism groups. Need for  food resources/other financial resources for dog care, etc  Distress regarding lack of contact, estranged sisters behavior. Fired caregivers.  Ongoing: Winter Fall while walking dog, injuries in both arms, elbow, wrist, rib. Grieving loss of adult son who was blind and lived alone fell and passed away, second loss of an adult/child. Noticing regrets about parenting. Distress regarding sister with Alzheimer's who has left local residence and is not in contact.   Treatment Objective(s) Addressed in This Session:    Practice boundaries and radical acceptance of reality regarding sister, sons, reality of accident  Improve quantity and quality of night time sleep / decrease daytime naps      Intervention:  Supportive therapy: Provided active listening and validation. Validated focus on spirituality/Spiritism connections/meaning. Explored resistance to using cane/walker in public places beyond walking the dog. Reinforced help seeking communication and behaviors.    Motivational Interviewing  Target Behavior:  radical acceptance of reality, gratitude , continue seeking help-for food access.     Stage of Change: ACTION (Actively working towards change)    MI Intervention: Expressed Empathy/Understanding, Supported Autonomy, Collaboration, Evocation and Open-ended questions     Change Talk Expressed by the Patient: Desire to change Reasons to change Activation    Provider Response to Change Talk: E - Evoked more info from patient about behavior change and A - Affirmed patient's thoughts, decisions, or attempts at behavior change    Assessments  completed prior to visit:  LAURA  12/5/22  The following assessments were completed by patient for this visit:  PHQ2:       2/27/2023     8:50 AM 2/24/2023     6:48 AM 2/20/2023     5:39 PM 1/15/2023    11:54 AM 11/16/2022     1:33 PM 8/5/2022     8:11 AM 7/6/2022     4:18 PM   PHQ-2 ( 1999 [employer])   Q1: Little interest or pleasure in doing things 0 1 1 1 1 3    Q2: Feeling down, depressed or hopeless 0 1 1 1 1 0    PHQ-2 Score 0 2 2 2 2 3    Q1: Little interest or pleasure in doing things  Several days Several days Several days Several days Nearly every day    Q2: Feeling down, depressed or hopeless  Several days Several days Several days Several days Not at all    PHQ-2 Score  2 2 2 2 3 Incomplete     PHQ9:       1/9/2023     3:10 PM 2/21/2023    11:28 AM 2/23/2023     8:40 AM 3/14/2023    12:35 PM 6/5/2023     2:31 PM 8/23/2023     8:30 AM 9/26/2023     9:50 AM   PHQ-9 SCORE   PHQ-9 Total Score MyChart 7 (Mild depression)  4 (Minimal depression) 2 (Minimal depression)  5 (Mild depression) 4 (Minimal depression)   PHQ-9 Total Score 7 5 4 2    2 10 5 4     Patient Health Questionnaire (Submitted on 9/26/2023)  If you checked off any problems, how difficult have these problems made it for you to do your work, take care of things at home, or get along with other people?: Somewhat difficult  PHQ9 TOTAL SCORE: 4  GAD2:       12/5/2022     9:19 AM 1/3/2023    11:27 AM 2/2/2023     7:27 AM 2/23/2023     8:41 AM 3/12/2023    10:36 AM 8/23/2023     8:31 AM 9/23/2023    10:05 AM   SPRING-2   Feeling nervous, anxious, or on edge 1    1  1 1 1    1 0 0   Not being able to stop or control worrying 1    1 1 1 0 0    0 0 0   SPRING-2 Total Score 2    2  2 1 1    1 0 0     GAD7:       2/22/2018     1:46 PM 6/5/2019     4:10 PM 6/16/2020     9:54 AM 7/6/2022     4:18 PM 8/5/2022     8:11 AM 11/16/2022     1:33 PM 6/5/2023     2:31 PM   SPRING-7 SCORE   Total Score    2 (minimal anxiety) 5 (mild anxiety) 2 (minimal anxiety)    Total Score 0 0  7 2 5 2 3     CAGE-AID:       12/5/2022     9:28 AM   CAGE-AID Total Score   Total Score 0   Total Score MyChart 0 (A total score of 2 or greater is considered clinically significant)     PROMIS 10-Global Health (only subscores and total score):       9/21/2017    11:00 AM 11/15/2018     1:00 PM 3/25/2022     7:34 AM 12/5/2022     9:28 AM 3/12/2023    10:38 AM 8/23/2023     8:34 AM   PROMIS-10 Scores Only   Global Mental Health Score 13 12 12 8    8 8    8 12   Global Physical Health Score 14 12 12 13    13 11    11 12   PROMIS TOTAL - SUBSCORES 27 24 24 21    21 19    19 24     [facility] Suicide Severity Rating Scale (Lifetime/Recent)      12/5/2022    11:00 AM   [facility] Suicide Severity Rating (Lifetime/Recent)   Q1 Wished to be Dead (Past Month) no   Q2 Suicidal Thoughts (Past Month) no   Q3 Suicidal Thought Method no   Q4 Suicidal Intent without Specific Plan no   Q5 Suicide Intent with Specific Plan no   Q6 Suicide Behavior (Lifetime) no   Level of Risk per Screen low risk         ASSESSMENT: Current Emotional / Mental Status (status of significant symptoms):   Risk status (Self / Other harm or suicidal ideation)   Patient denies current fears or concerns for personal safety.   Patient denies current or recent suicidal ideation or behaviors.   Patient denies current or recent homicidal ideation or behaviors.   Patient denies current or recent self injurious behavior or ideation.   Patient denies other safety concerns.   Patient reports there has been no change in risk factors since their last session.     Patient reports there has been no change in protective factors since their last session.     Recommended that patient call 911 or go to the local ED should there be a change in any of these risk factors.     Appearance:   Appropriate    Eye Contact:   Good    Psychomotor Behavior: Normal    Attitude:   Pleasant Attentive   Orientation:   All   Speech    Rate / Production: Emotional Normal     Volume:  Normal     Mood:    Depressed    Affect:    Appropriate    Thought Content:  Clear    Thought Form:  Coherent  Logical  Circumstantial   Insight:    Good      Medication Review:   No changes to current psychiatric medication(s)     Medication Compliance:   Yes     Changes in Health Issues:   None reported     Chemical Use Review:   Substance Use: Chemical use reviewed, no active concerns identified      Tobacco Use: No change in amount of tobacco use since last session.  Patient declined discussion at this time    Diagnosis:  1. Moderate episode of recurrent major depressive disorder (H)        Collateral Reports Completed:   Not Applicable    PLAN: (Patient Tasks / Therapist Tasks / Other)   Embrace radical acceptance and boundaries. Continue using food resources    Rocío Arenas [facility] 9/26/2023                                                                                                  Individual Treatment Plan    Patient's Name: Ginger Marshall  YOB: 1940    Date of Creation: 2/8/23  Date Treatment Plan Last Reviewed/Revised:   9/26/2023      DSM5 Diagnoses: 296.31 (F33.0) Major Depressive Disorder, Recurrent Episode, Mild With anxious distress  Psychosocial / Contextual Factors: aging, losses, generational trauma  PROMIS (reviewed every 90 days):   21  12/5/22    Referral / Collaboration:  Referral to another professional/service is not indicated at this time..    Anticipated number of session for this episode of care: 6-9 sessions  Anticipation frequency of session: Every other week  Anticipated Duration of each session: 38-52 minutes  Treatment plan will be reviewed in 90 days or when goals have been changed.     MeasurableTreatment Goal(s) related to diagnosis / functional impairment(s)  Goal 1: Patient will experience an improvement in mood and functioning as evidenced by assessment scores, self report and clinician observation    I will know I've met my goal when things feel better (paraphrase).       Objective #A (Patient Action)    Patient will increase understanding of steps in the grief process   Talk to others about losses  Use prayer practices and jena community to support well being.   Practice boundaries and radical acceptance of reality regarding sister sons, reality of accident   [facility]  Status: 9/26/2023    Intervention(s)  Therapist will teach CBT, DBT  and support grief processing skills, prayer practices .    Objective #B  Patient will Increase interest, engagement, and pleasure in doing things  Decrease frequency and intensity of feeling down, depressed, hopeless  Improve quantity and quality of night time sleep / decrease daytime naps  Feel less tired and more energy during the day   Improve diet, appetite, mindful eating, and / or meal planning  Identify negative self-talk and behaviors: challenge core beliefs, myths, and actions  Improve concentration, focus, and mindfulness in daily activities   Feel less fidgety, restless or slow in daily activities / interpersonal interactions.  Status: 9/26/2023      Intervention(s)  Therapist will  teach cbt, dbt,MI, mindfulness and behavioral activation and assign homework .      Patient has reviewed and agreed to the above plan.      Rocío Arenas, [facility]  9/26/2023

## 2023-09-28 ENCOUNTER — PATIENT OUTREACH (OUTPATIENT)
Dept: CARE COORDINATION | Facility: CLINIC | Age: 83
End: 2023-09-28
Payer: COMMERCIAL

## 2023-09-28 NOTE — PROGRESS NOTES
Clinic Care Coordination Contact  Care Team Conversations    Pt called CHW this morning, leaving VM explaining she is unable to find her voucher for the [employer] program. Pt wonders what she should do.    CHW spoke with pt this afternoon. Relayed the message that she doesn't need the voucher to continue shopping at the [employer] or [employer] sites. Pt advised to proceed to check-out after shopping. At check-out, she can tell the  she is a part of the [employer] program. They should have a list of people enrolled in the [employer] program.     If she really wants another voucher, she can look at her packet of information received from [employer], and call the program  to request another one. A voucher is not necessary, however, to shop at [employer]. Pt verbalizes understanding.    CHW Plan: CHW will contact pt in 3 weeks.    Melissa Mckeon  Community Health Worker  [facility] [place]  [phone]

## 2023-10-23 ENCOUNTER — VIRTUAL VISIT (OUTPATIENT)
Dept: PSYCHOLOGY | Facility: CLINIC | Age: 83
End: 2023-10-23
Payer: COMMERCIAL

## 2023-10-23 DIAGNOSIS — F33.1 MODERATE EPISODE OF RECURRENT MAJOR DEPRESSIVE DISORDER (H): Primary | ICD-10-CM

## 2023-10-23 PROCEDURE — 90837 PSYTX W PT 60 MINUTES: CPT | Mod: VID

## 2023-10-23 NOTE — PROGRESS NOTES
[facility]                                     Progress Note    Patient Name: Ginger Marshall  Date: 10/23/23         Service Type: Individual      Session Start Time: 2:00 pm  Session End Time: 2:55 pm     Session Length: 55 min    Session #: 10      Answers submitted by the patient for this visit:  Patient Health Questionnaire (Submitted on 9/26/2023)  If you checked off any problems, how difficult have these problems made it for you to do your work, take care of things at home, or get along with other people?: Somewhat difficult  PHQ9 TOTAL SCORE: 4      Attendees: Client attended alone    Service Modality:  Video Visit:      Provider verified identity through the following two step process.  Patient provided:  Patient is known previously to provider    Telemedicine Visit: The patient's condition can be safely assessed and treated via synchronous audio and visual telemedicine encounter.      Reason for Telemedicine Visit: Patient convenience (e.g. access to timely appointments / distance to available provider)    Originating Site (Patient Location): Patient's home    Distant Site (Provider Location): Provider Remote Setting- Home Office    Consent:  The patient/guardian has verbally consented to: the potential risks and benefits of telemedicine (video visit) versus in person care; bill my insurance or make self-payment for services provided; and responsibility for payment of non-covered services.     Patient would like the video invitation sent by:  My Chart    Mode of Communication:  Video Conference via [employer]well    Distant Location (Provider):  Off-site    As the provider I attest to compliance with applicable laws and regulations related to telemedicine.    DATA  Interactive Complexity: No     Crisis: No    Extended Session (53+ minutes): PROLONGED SERVICE IN THE OUTPATIENT SETTING REQUIRING DIRECT (FACE-TO-FACE) PATIENT CONTACT BEYOND THE USUAL SERVICE:    - Longer session due to limited  access to mental health appointments and necessity to address patient's distress / complexity  Interactive Complexity: No  Crisis: No        Progress Since Last Session (Related to Symptoms / Goals / Homework):   Symptoms: No change continued depression , low energy    Homework: Partially completed      Episode of Care Goals: Minimal progress - ACTION (Actively working towards change); Intervened by reinforcing change plan / affirming steps taken     Current / Ongoing Stressors and Concerns:  Distress regarding lack of contact with sister,limited to no contact with son's adult children Low motivation regarding household chores, cognitive barriers to seeking more resourcses. Low quality sleep, even with CPAP-waking up tired, nap in afternoon. Focus on socializing with various Anab[employer]ist groups. Distress regarding lack of contact, estranged sisters behavior. Fired caregivers.  Ongoing: Winter Fall while walking dog, injuries in both arms, elbow, wrist, rib. Grieving loss of adult son who was blind and lived alone fell and passed away, second loss of an adult/child. Noticing regrets about parenting. Distress regarding sister with Alzheimer's who has left local residence and is not in contact.   Treatment Objective(s) Addressed in This Session:    Practice boundaries and radical acceptance of reality regarding sister, sons, reality of accident  Improve quantity and quality of night time sleep / decrease daytime naps      Intervention:  Supportive therapy: Provided active listening and validation. Validated focus on spirituality/Anabaptist connections/meaning. Explored feelings around family contact  ACT: Introduced values exercise,assigned homework.   Motivational Interviewing  Target Behavior:  radical acceptance of reality, gratitude , continue seeking help-for food access.     Stage of Change: ACTION (Actively working towards change)    MI Intervention: Expressed Empathy/Understanding, Supported Autonomy, Collaboration,  Evocation and Open-ended questions     Change Talk Expressed by the Patient: Desire to change Reasons to change Activation    Provider Response to Change Talk: E - Evoked more info from patient about behavior change and A - Affirmed patient's thoughts, decisions, or attempts at behavior change    Assessments completed prior to visit:  LAURA  12/5/22  The following assessments were completed by patient for this visit:  PHQ2:       2/27/2023     8:50 AM 2/24/2023     6:48 AM 2/20/2023     5:39 PM 1/15/2023    11:54 AM 11/16/2022     1:33 PM 8/5/2022     8:11 AM 7/6/2022     4:18 PM   PHQ-2 ( 1999 [employer])   Q1: Little interest or pleasure in doing things 0 1 1 1 1 3    Q2: Feeling down, depressed or hopeless 0 1 1 1 1 0    PHQ-2 Score 0 2 2 2 2 3    Q1: Little interest or pleasure in doing things  Several days Several days Several days Several days Nearly every day    Q2: Feeling down, depressed or hopeless  Several days Several days Several days Several days Not at all    PHQ-2 Score  2 2 2 2 3 Incomplete     PHQ9:       1/9/2023     3:10 PM 2/21/2023    11:28 AM 2/23/2023     8:40 AM 3/14/2023    12:35 PM 6/5/2023     2:31 PM 8/23/2023     8:30 AM 9/26/2023     9:50 AM   PHQ-9 SCORE   PHQ-9 Total Score MyChart 7 (Mild depression)  4 (Minimal depression) 2 (Minimal depression)  5 (Mild depression) 4 (Minimal depression)   PHQ-9 Total Score 7 5 4 2    2 10 5 4     Patient Health Questionnaire (Submitted on 9/26/2023)  If you checked off any problems, how difficult have these problems made it for you to do your work, take care of things at home, or get along with other people?: Somewhat difficult  PHQ9 TOTAL SCORE: 4  GAD2:       12/5/2022     9:19 AM 1/3/2023    11:27 AM 2/2/2023     7:27 AM 2/23/2023     8:41 AM 3/12/2023    10:36 AM 8/23/2023     8:31 AM 9/23/2023    10:05 AM   SPRING-2   Feeling nervous, anxious, or on edge 1    1  1 1 1    1 0 0   Not being able to stop or control worrying 1    1 1 1 0 0    0 0 0   SPRING-2  Total Score 2    2  2 1 1    1 0 0     GAD7:       2/22/2018     1:46 PM 6/5/2019     4:10 PM 6/16/2020     9:54 AM 7/6/2022     4:18 PM 8/5/2022     8:11 AM 11/16/2022     1:33 PM 6/5/2023     2:31 PM   SPRING-7 SCORE   Total Score    2 (minimal anxiety) 5 (mild anxiety) 2 (minimal anxiety)    Total Score 0 0 7 2 5 2 3     CAGE-AID:       12/5/2022     9:28 AM   CAGE-AID Total Score   Total Score 0   Total Score MyChart 0 (A total score of 2 or greater is considered clinically significant)     PROMIS 10-Global Health (only subscores and total score):       9/21/2017    11:00 AM 11/15/2018     1:00 PM 3/25/2022     7:34 AM 12/5/2022     9:28 AM 3/12/2023    10:38 AM 8/23/2023     8:34 AM   PROMIS-10 Scores Only   Global Mental Health Score 13 12 12 8    8 8    8 12   Global Physical Health Score 14 12 12 13    13 11    11 12   PROMIS TOTAL - SUBSCORES 27 24 24 21    21 19    19 24     [place] Suicide Severity Rating Scale (Lifetime/Recent)      12/5/2022    11:00 AM   [place] Suicide Severity Rating (Lifetime/Recent)   Q1 Wished to be Dead (Past Month) no   Q2 Suicidal Thoughts (Past Month) no   Q3 Suicidal Thought Method no   Q4 Suicidal Intent without Specific Plan no   Q5 Suicide Intent with Specific Plan no   Q6 Suicide Behavior (Lifetime) no   Level of Risk per Screen low risk         ASSESSMENT: Current Emotional / Mental Status (status of significant symptoms):   Risk status (Self / Other harm or suicidal ideation)   Patient denies current fears or concerns for personal safety.   Patient denies current or recent suicidal ideation or behaviors.   Patient denies current or recent homicidal ideation or behaviors.   Patient denies current or recent self injurious behavior or ideation.   Patient denies other safety concerns.   Patient reports there has been no change in risk factors since their last session.     Patient reports there has been no change in protective factors since their last session.     Recommended  that patient call 911 or go to the local ED should there be a change in any of these risk factors.     Appearance:   Appropriate    Eye Contact:   Good    Psychomotor Behavior: Normal    Attitude:   Pleasant Attentive   Orientation:   All   Speech    Rate / Production: Emotional Normal     Volume:  Normal    Mood:    Depressed    Affect:    Appropriate    Thought Content:  Clear    Thought Form:  Coherent  Logical  Circumstantial   Insight:    Good      Medication Review:   No changes to current psychiatric medication(s)     Medication Compliance:   Yes     Changes in Health Issues:   None reported     Chemical Use Review:   Substance Use: Chemical use reviewed, no active concerns identified      Tobacco Use: No change in amount of tobacco use since last session.  Patient declined discussion at this time    Diagnosis:  1. Moderate episode of recurrent major depressive disorder (H)        Collateral Reports Completed:   Not Applicable    PLAN: (Patient Tasks / Therapist Tasks / Other)   Embrace radical acceptance and boundaries. Continue using food resources. Complete values exercise    [facility]as [facility] 10/23/2023                                                                                                  Individual Treatment Plan    Patient's Name: Ginger Marshall  YOB: 1940    Date of Creation: 2/8/23  Date Treatment Plan Last Reviewed/Revised:   9/26/2023      DSM5 Diagnoses: 296.31 (F33.0) Major Depressive Disorder, Recurrent Episode, Mild With anxious distress  Psychosocial / Contextual Factors: aging, losses, generational trauma  PROMIS (reviewed every 90 days):   21 12/5/22    Referral / Collaboration:  Referral to another professional/service is not indicated at this time..    Anticipated number of session for this episode of care: 6-9 sessions  Anticipation frequency of session: Every other week  Anticipated Duration of each session: 38-52 minutes  Treatment plan will be reviewed in 90  days or when goals have been changed.     MeasurableTreatment Goal(s) related to diagnosis / functional impairment(s)  Goal 1: Patient will experience an improvement in mood and functioning as evidenced by assessment scores, self report and clinician observation    I will know I've met my goal when things feel better (paraphrase).      Objective #A (Patient Action)    Patient will increase understanding of steps in the grief process   Talk to others about losses  Use prayer practices and jena community to support well being.   Practice boundaries and radical acceptance of reality regarding sister sons, reality of accident   Engage in social activities at [facility]  Status: 9/26/2023    Intervention(s)  Therapist will teach CBT, DBT  and support grief processing skills, prayer practices .    Objective #B  Patient will Increase interest, engagement, and pleasure in doing things  Decrease frequency and intensity of feeling down, depressed, hopeless  Improve quantity and quality of night time sleep / decrease daytime naps  Feel less tired and more energy during the day   Improve diet, appetite, mindful eating, and / or meal planning  Identify negative self-talk and behaviors: challenge core beliefs, myths, and actions  Improve concentration, focus, and mindfulness in daily activities   Feel less fidgety, restless or slow in daily activities / interpersonal interactions.  Status: 9/26/2023      Intervention(s)  Therapist will  teach cbt, dbt,MI, mindfulness and behavioral activation and assign homework .      Patient has reviewed and agreed to the above plan.      HECTOR Springer  9/26/2023

## 2023-10-26 ENCOUNTER — OFFICE VISIT (OUTPATIENT)
Dept: FAMILY MEDICINE | Facility: CLINIC | Age: 83
End: 2023-10-26
Payer: COMMERCIAL

## 2023-10-26 VITALS
WEIGHT: 204 LBS | TEMPERATURE: 98.4 F | DIASTOLIC BLOOD PRESSURE: 74 MMHG | HEART RATE: 70 BPM | RESPIRATION RATE: 17 BRPM | SYSTOLIC BLOOD PRESSURE: 132 MMHG | BODY MASS INDEX: 38.55 KG/M2 | OXYGEN SATURATION: 95 %

## 2023-10-26 DIAGNOSIS — J40 BRONCHITIS: Primary | ICD-10-CM

## 2023-10-26 DIAGNOSIS — R73.03 PREDIABETES: ICD-10-CM

## 2023-10-26 DIAGNOSIS — D84.9 IMMUNOCOMPROMISED (H): ICD-10-CM

## 2023-10-26 PROCEDURE — 99214 OFFICE O/P EST MOD 30 MIN: CPT | Performed by: INTERNAL MEDICINE

## 2023-10-26 RX ORDER — PREDNISONE 20 MG/1
20 TABLET ORAL DAILY
Qty: 5 TABLET | Refills: 0 | Status: SHIPPED | OUTPATIENT
Start: 2023-10-26 | End: 2023-10-31

## 2023-10-26 RX ORDER — AZITHROMYCIN 250 MG/1
TABLET, FILM COATED ORAL
Qty: 6 TABLET | Refills: 0 | Status: SHIPPED | OUTPATIENT
Start: 2023-10-26 | End: 2023-10-31

## 2023-10-26 ASSESSMENT — ENCOUNTER SYMPTOMS
SHORTNESS OF BREATH: 0
GASTROINTESTINAL NEGATIVE: 1
EYE DISCHARGE: 0
CHILLS: 0
CHEST TIGHTNESS: 0
FEVER: 0
COUGH: 1
HEMATOLOGIC/LYMPHATIC NEGATIVE: 1

## 2023-10-26 ASSESSMENT — PAIN SCALES - GENERAL: PAINLEVEL: SEVERE PAIN (6)

## 2023-10-26 NOTE — COMMUNITY RESOURCES LIST (ENGLISH)
10/26/2023   [facility] - Outpatient Clinics  N/A  For additional resource needs, please contact your health insurance member services or your primary care team.  Phone: [phone]   Email: N/A   Address: [address] [place] [id number]   Hours: N/A        Food and Nutrition       Food pantry  1  [address] [place] [address] Distance: 0.79 miles      In-Person, Pickup   [address] [place] [address]  Language: English  Hours: Mon 8:00 AM - 4:00 PM , Tue 8:00 AM - 7:00 PM , Wed - Thu 8:00 AM - 4:00 PM  Fees: Free   Phone: (781) 525-1816 Email: info@[facility] Wars[facility] CenterGet Satisfaction Website: https://[address] [place], [place] [address]Get Satisfaction/resources/resource-centers/     2  [facility] Distance: 2.32 miles      In-Person   [address] [place] [address]  Language: English, Guyanese  Hours: Mon - Fri 5:00 AM - 10:00 PM  Fees: Free   Phone: (887) 666-6518 Email: admissions@[facility] Website: http://www.[facility]     SNAP application assistance  3  [employer] [place] Distance: 15.2 miles      Phone/[address] [address]  Language: English, Hmong, Congolese, Greek, Guyanese  Hours: Mon - Fri 8:30 AM - 4:30 PM  Fees: Free   Phone: (419) 320-6598 Email: helpline@[employer].org Website: https://www.hungers[employer].org/programs/[place]-food-helpline/     4  [employer] (CAP) [facility] Distance: 0.5 miles      In-Person   [address] [place] [address]  Language: English, Guyanese  Hours: Mon - Fri 8:00 AM - 8:00 PM  Fees: Free   Phone: (478) 662-4801 Email: info@[employer].org Website: http://www.capagency.org     Soup kitchen or free meals  5  [facility] Distance: 4.2 miles      Pickup   [address] [place] [address]  Language: English, Guyanese  Hours: Mon - Thu 5:30 PM - 6:30 PM  Fees: Free   Phone: (266) 692-1543 Email:  missy@[employer] Website: http://[employer]/wordpress/?page_id=5168     6  [facility] Distance: 9.29 miles      In-Person, Pickup   [address] [place] [address]  Language: English  Hours: Mon - Thu 5:00 PM - 6:30 PM  Fees: Free   Phone: (392) 363-1651 Email: office@[employer] Website: http://[employer]/          Important Numbers & Websites       [address]org  Poison Control   (260) 710-8447 [place]  Suicide and Crisis Lifeline   [address] [place], [place] [address]line.org  Childhelp [place] Child Abuse Hotline   417.845.2413 Childhelphotline.org  [place] Sexual Assault Hotline   (147) 228-9799 (HOPE) [facility] Runaway Safeline   (157) 421-4322 (RUNAWAY) [facility].org  Pregnancy & Postpartum Support [place]   Call/text 898-112-6049 Ppsupport[place].org  Substance Abuse National Helpline ([facility]   697-557-HELP (2497) Findtreatment.gov  Emergency Services   911

## 2023-10-26 NOTE — PROGRESS NOTES
"  Assessment & Plan     (J40) Bronchitis  (primary encounter diagnosis)  Comment: allergic to Levaquin and Sulfa antibiotics.  Wheezes right base>left  Oxygenating well   Plan: azithromycin (ZITHROMAX) 250 MG tablet,         predniSONE (DELTASONE) 20 MG tablet        Continue Albuterol 2-3 times per day; continue Mucinex    (D84.9) Immunocompromised (H24)  Comment: per rheumatologist.   Plan: no change    (R73.03) Prediabetes  Comment: healthy diet and regular exercise.  Plan:     Ordering of each unique test  Prescription drug management  30 minutes spent by me on the date of the encounter doing chart review, history and exam, documentation and further activities per the note       BMI:   Estimated body mass index is 38.55 kg/m  as calculated from the following:    Height as of 8/2/23: 1.549 m (5' 1\").    Weight as of this encounter: 92.5 kg (204 lb).   Weight management plan: Discussed healthy diet and exercise guidelines    MEDICATIONS:   Orders Placed This Encounter   Medications    azithromycin (ZITHROMAX) 250 MG tablet     Sig: Take 2 tablets (500 mg) by mouth daily for 1 day, THEN 1 tablet (250 mg) daily for 4 days.     Dispense:  6 tablet     Refill:  0    predniSONE (DELTASONE) 20 MG tablet     Sig: Take 1 tablet (20 mg) by mouth daily for 5 days     Dispense:  5 tablet     Refill:  0          - Continue other medications without change  Regular exercise    Gabriela Jefferson MD  Internal Medicine   [facility] RAMANA [place] is a 83 year old, presenting for the following health issues:  Cough (For about 10 days   now having pain in her back behind the shoulder blade.   )        10/26/2023     2:51 PM   Additional Questions   Roomed by Nuria YEBOAH LPN       History of Present Illness       Reason for visit:  Cough and pain in upper back  Symptom onset:  1-2 weeks ago  Symptoms include:  Cough and pain in upper back  Symptom intensity:  Moderate  Symptom progression:  " Staying the same  Had these symptoms before:  Yes  Has tried/received treatment for these symptoms:  Yes  Previous treatment was successful:  Yes  Prior treatment description:  Antibiotics and cough syrup  What makes it worse:  No  What makes it better:  No    She eats 2-3 servings of fruits and vegetables daily.She consumes 0 sweetened beverage(s) daily.She exercises with enough effort to increase her heart rate 9 or less minutes per day.  She exercises with enough effort to increase her heart rate 3 or less days per week.   She is taking medications regularly.     PT believes it may be bronchitis.  Has not tested for COVID but symptoms are all in her chest and not fevers, mild body aches and intermittent headaches that are relieved with Tylenol         Review of Systems   Constitutional:  Negative for chills and fever.   HENT:  Negative for ear pain.    Eyes:  Negative for discharge.   Respiratory:  Positive for cough. Negative for chest tightness and shortness of breath.    Gastrointestinal: Negative.    Endocrine:        Prediabetes- treated with healthy diet and regular exercise.    Genitourinary: Negative.    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    Hematological: Negative.           Objective    /74   Pulse 70   Temp 98.4  F (36.9  C) (Oral)   Resp 17   Wt 92.5 kg (204 lb)   SpO2 95%   BMI 38.55 kg/m    Body mass index is 38.55 kg/m .  Physical Exam   GENERAL: healthy, alert and no distress  EYES: Eyes grossly normal to inspection  NECK: no adenopathy, no asymmetry, masses, or scars and thyroid normal to palpation  RESP: no rales  and inspiratory wheezes R lower posterior  CV: regular rates and rhythm, normal S1 S2, no S3 or S4, no murmur, click or rub, and no peripheral edema  ABDOMEN: soft, nontender, no hepatosplenomegaly, no masses and bowel sounds normal  MS: no gross musculoskeletal defects noted, no edema  NEURO: Normal  strength and tone, mentation intact and speech normal  PSYCH: mentation appears normal, affect normal/bright

## 2023-10-27 ENCOUNTER — PATIENT OUTREACH (OUTPATIENT)
Dept: CARE COORDINATION | Facility: CLINIC | Age: 83
End: 2023-10-27
Payer: COMMERCIAL

## 2023-10-27 NOTE — PROGRESS NOTES
Clinic Care Coordination Contact  Care Coordination Clinician Chart review    Situation: Patient chart reviewed by care coordinator.       Background: Care Coordination initial assessment and enrollment took place 6/5/2023.   Upon initial assessment patient-centered goals were discussed and developed with participation from patient.  RNCC handed patient follow up and monitoring of goal progression off to CHW for continued outreach every 30 days.        Assessment: Per chart review, patient outreach completed by CC CHW on 09/21/2023.  Patient is actively working to accomplish goal(s) and patient's goal(s) remain(s) appropriate and relevant at this time.       Care Plan: Specialty Referral       Problem: Patient is in need of specialty care       Long-Range Goal: Establish consistent relationship with specialist(s) as needed       Start Date: 6/8/2023 Expected End Date: 5/31/2024    This Visit's Progress: 70% Recent Progress: 60%    Note:     Barriers: diagnosis of multiple, chronic, complex medical conditions, provider availability - wait time to complete appointments, etc.   Strengths: motivated, engaged in care coordination.   Patient expressed understanding of goal: yes  Action steps to achieve this goal:  1. I will follow up with my providers as scheduled/recommended.   - TCO: 06/05/23 - follow up scheduled as recommended 6 weeks, date unknown  - Bone stimulator pending  - Mental Health: 09/26/23   - Eye exam: Scheduled end of summer - 10/3/23 per pt  - MTM Pharmacy: 7/31/23 - follow up recommended 6 months; 01/2024 TBD  - Endocrinology: 08/02/23 - biopsy 08/10/23 - needs to schedule follow up TBD  - Rheumatology: 08/25/23 - follow up scheduled 12/01/23  - Sleep: 01/24/2024  - Primary Care Provider 02/28/2024  - Dexa: 02/06/24  - Podiatry 05/11/23 - declines to follow up at this time.   - Physical Therapy: TBD  2. I will discuss, review, schedule and complete recommended overdue health maintenance with my Primary  Care Provider.   3. I will contact my care team with questions, concerns, support needs. I will use the clinic as a resource and I understand I can contact my clinic with 24/7 after hours services available. Care Coordinator will remain available as needed.      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   Patient expressed understanding of goal: Yes  Action steps to achieve this goal:  1. I will review the following resources: (completed)  Hot Meals:   Easter by the [facility]                          [address], [place] [address]  Note: Please do NOT go to the building located at [address]. Loaves & Fishes meals are served at the corner of [address] and [address].     Meals Served: Monday - Thursday from 5:30 - 6:30 PM  Service Location: Meals served at the [facility] front doors (2 white double doors)  [employer] - [phone]            [address]. [facility], [address]  2nd Thursday of each month - curbside available. 5:30-6:30 pm     [facility] - [phone]                        [address] [place] [address]  Wednesdays, 5:45-6:25 pm during school year only.      [facility] - [phone]        [address] [facility] [address]  Every 3rd and 4th Thursday, 5:00-6:00 pm.                    Curbside only due to COVID-19.  Those wishing a meal should enter the lower parking lot and follow directions.  Meals are picked up by Door 1.     [facility] - [phone]     [address], [place], [place]   Drive Through Meals: Monday 5:30-6:30 pm     [facility] - [phone]  [address], [place] [address]  Monday thru Thursday, 5:30-6:30 pm. Curbside only due to COVID-19.     Food Pantries:   [facility] [phone] [address], [place], [place], [address]   Partners with local charities and offers the [address] [place] [address], formerly [facility] [employer] [place] Food Shelves      [facility] - [phone] [address] [place] [address] Hours: Tue 10:20 AM - 3:40 PM , Thu 10:20 AM - 3:40 PM  Fees: Free       The main service they offer is [employer] food shelf. They also run several federal government USDA supported programs in [facility]. This is run in partnership with [address] [place] [address] and other local social service agencies.      Demetria Chirag [facility] - [phone]  [address] [place] [address]    Hours: Tue 11:30 AM - 12:30 PM  Fees: Self Pay       [facility] - [phone] [address] [place] [address]   Hours: Mon - Wed 10:00 AM - 3:00 PM , Tue 5:30 PM - 7:30 PM , Thu 5:30 PM - 7:30 PM , Thu - Fri 10:00 AM - 12:00 PM  Fees: Free       [facility]  [phone]   Serves families in [facility]. Programs can provide food, meals, and other non-financial support.      [facility] [phone] i [address] [place], [place], [address]   This location operates a food shelf and other social service programs. Assistance offered is extensive, and includes personal hygiene supplies, household products, laundry and household . Or speak to a  and apply for other resources such as food stamps, TANF welfare, or more.      A second  location of the [facility] is located at [address] [place], [address]. They offer many of the same programs and resources as indicated above.      [address] [facility] Food Shelf - [phone] [address] [place] [address] Hours: Tue 10:00 PM - 3:00 PM Appt. Only, Thu 12:00 PM - 6:00 PM Appt. Only  Fees: Free  (7/24/23 - going to check into this resource)     [facility]  - [phone] [address] [place] [address]  Hours: Sat 11:30 PM - 12:30 PM  Fees: Free       [facility] [phone]   [address], [place], [address]   The non-profit offers Free Food for seniors and other income qualified elderly and residents. The main resource they offer from this facility is the Nutrition Assistance for Seniors (NAPS) supplemental food program. Government commodities, vouchers, and other food assistance is provided     [place] [phone] [address] [place], [place], [address]      [facility] - [phone] [address] [place] [address]  Hours: Tue 1:00 PM - 3:30 PM  Fees: Free    [address] [place] [address] - Coordinated Entry   [address] [place] [address]  Fees: Free [phone]   Hours: Mon - Thu 9:00 AM - 4:00 PM      [facility]   [address] [place] [address]  Fees: Free [phone]  Hours: Mon 4:00 PM - 6:00 PM Tue 11:00 AM - 1:00 PM , Wed 4:00 PM - 6:00 PM , Thu 10:30 AM - 12:30 PM, 2-3 PM      [facility] [address] [place] [address]  Fees: Free [phone]  Hours: Tue - Sat 10:00 AM - 6:00 PM      [facility] - [phone] Ext. [phone]   [address] [place], [place] [address]Tnmqj: Tue 10:00 AM - 12:00 PM  Fees: Free      Additional food pantries operate in [facility] [phone]   The centers may offer groceries, hot  meals, and special seasonal programs such as summer snacks for students or free [place] and Thanksgiving meals      2. I will sign up for [employer] program and is utilizing $80 per month at [facility] location in her area (completed)                                Plan/Recommendations: RNCC Clinical Assessments to be completed annually or as needed. Annual Assessment will be due 06/2024. The patient will continue working with Care Coordination to achieve goal(s) as above.  CHW will involve RNCC as needed or if patient is ready to transition to goal status of Maintenance.  RNCC will continue to review progress to goal(s) and CHW outreaches every 6 weeks.     Complex Care Plan:  Patient is not due for updated Plan of Care.  Care Plan updated and sent to patient: No    Lucille Awad RN Care Coordinator  [facility]  Email: [email or web address]  Phone: [phone]

## 2023-11-01 ENCOUNTER — TELEPHONE (OUTPATIENT)
Dept: CARE COORDINATION | Facility: CLINIC | Age: 83
End: 2023-11-01
Payer: COMMERCIAL

## 2023-11-01 ENCOUNTER — PATIENT OUTREACH (OUTPATIENT)
Dept: CARE COORDINATION | Facility: CLINIC | Age: 83
End: 2023-11-01
Payer: COMMERCIAL

## 2023-11-01 NOTE — TELEPHONE ENCOUNTER
Pt was prescribed a z-pack which typically stay in your system for 10 days and pt was on prednisone.  Wonder if pt should be seen again.?     Will discuss with Dr. Jefferson.

## 2023-11-01 NOTE — PROGRESS NOTES
Clinic Care Coordination Contact  Community Health Worker Follow Up    Care Gaps: Discussed 9/21/23    Health Maintenance Due   Topic Date Due    URINE DRUG SCREEN  Never done    RSV VACCINE 60+ (1 - 1-dose 60+ series) Never done    EYE EXAM  08/18/2021    A1C  08/12/2023    INFLUENZA VACCINE (1) 09/01/2023    COVID-19 Vaccine (3 - 2023-24 season) 09/01/2023       Care Gap Goal set: Yes    Care Plan:   Care Plan: Specialty Referral       Problem: Patient is in need of specialty care       Long-Range Goal: Establish consistent relationship with specialist(s) as needed       Start Date: 6/8/2023 Expected End Date: 5/31/2024    This Visit's Progress: 80% Recent Progress: 70%    Note:     Barriers: diagnosis of multiple, chronic, complex medical conditions, provider availability - wait time to complete appointments, etc.   Strengths: motivated, engaged in care coordination.   Patient expressed understanding of goal: yes  Action steps to achieve this goal:  1. I will follow up with my providers as scheduled/recommended.   - TCO: 06/05/23 - follow up scheduled as recommended 6 weeks, date unknown (completed)  - Bone stimulator pending  - Mental Health: 09/26/23 (meets 1-2x/mo with therapist)  - Eye exam: Scheduled end of summer - 10/3/23 per pt - (completed)  - MTM Pharmacy: 7/31/23 - follow up recommended 6 months; 01/2024 TBD  - Endocrinology: 08/02/23 - biopsy 08/10/23 - needs to schedule follow up TBD  - Rheumatology: 08/25/23 - follow up scheduled 12/01/23  - Sleep: 01/24/2024  - Primary Care Provider 02/28/2024  - Dexa: 02/06/24  - Podiatry 05/11/23 - declines to follow up at this time.   - Physical Therapy: (in progress at [facility] - 11/1/23)  2. I will discuss, review, schedule and complete recommended overdue health maintenance with my Primary Care Provider.   3. I will contact my care team with questions, concerns, support needs. I will use the clinic as a resource and I understand I can contact my clinic  with 24/7 after hours services available. Care Coordinator will remain available as needed.      Goal updated 11/1/23                                Intervention and Education during outreach:   Pt recovering from cough/cold, though today she feels her cough is worse. Completed course of prednisone and antibiotics last weekend. Wonders if she needs more antibiotic. CHW will route a telephone message to the Liane RN triage team to contact pt to address.  [facility] [employer] - pt is due for shopping trip 11/7/23. Has not requested another voucher from [employer] program. CHW will send information to obtain another [employer] voucher. CHW educated pt that she does not need the voucher to shop next week; pt is aware she can tell  at check-out that she is a member of the [employer] program and she will get her $80 of free groceries. Pt verbalizes understanding.   Eye appt - completed 10/3/23. Pt would like to get prescription sunglasses not covered by her insurance. A friend has coupons at an eyeglass store, so pt will check into this to see if it will be affordable to get prescription sunglasses through this company.  Energy assistance from [employer] - applied two months ago. Last year she didn't hear back from [employer] assistance until Nov or the year before until the beginning of next year.   TCO - MRI right hip completed 10/9/23. Receiving OP PT at [facility] located close to her at [employer]. Has completed 2 sessions; another session tomorrow.    CHW asks if pt has any further concerns or need for resources as this time. Pt states he/she does not. CHW made sure pt has CHW contact information. Pt will contact CHW with questions or updates before next outreach.       CHW Plan: CHW will send contact information to pt to obtain a physical voucher for the [employer] program to pt's [employer] Account. CHW will route a telephone message to the Liane RN triage team to contact pt to ongoing cough and to discuss  obtaining further antibiotics. CHW will follow up with pt in one month.    Melissa Mckeon  Community Health Worker  [facility]  [phone]

## 2023-11-01 NOTE — TELEPHONE ENCOUNTER
Talked to Dr. Jefferson.  She advised pt should be using her inhaler twice a day.  She said we could send in tessalon pearls, 100 mg BID, dispense 20 and we could offer her an appt.      Called the pt to advise of above.  She said she is using the inhaler at least twice daily.  She does not want tessalon pearls sent in because she says they don't work.  She can't schedule an appt today because she has company coming  - a prayer group which she can't cancel. Tomorrow she has a car appt and she has PT.    Offered 9 a.m. appt tomorrow - that is too early for her.  Asked her what time would work tomorrow.  She advised she does not know how long it would take for her car.  Advised she may need to be seen in  then with busy schedule.  Advised she could also call back, but the earlier in the day the better.

## 2023-11-01 NOTE — LETTER
[facility]  [address]  [place] [address]    November 1, 2023    Ginger MONTEMAYOR Sweats  [address]  [place] [address]      Karan Miles,    Thank you for taking the time to speak with me today.     Please reach out to Tati Law at [email or web address], letting her know you are enrolled in our [employer] program and would like a replacement [employer] voucher card. She will be able to send you another one.    If you have any further questions, do not hesitate to contact me.      Melissa Mckeon  Community Health Worker  [facility]  [phone]

## 2023-11-01 NOTE — TELEPHONE ENCOUNTER
Hello [facility],    I spoke with pt this morning. Her cough is worse this morning and she is wondering if she needs more antibiotic or other medications.    Pt saw Dr. Jefferson last week, 10/26/23, for her symptoms.    Might you be able to reach out to patient to talk about her symptoms and address need for more medication?    Thanks so much!!    Melissa Mckeon  Community Health Worker  [facility]  [phone]

## 2023-11-19 ASSESSMENT — PATIENT HEALTH QUESTIONNAIRE - PHQ9
SUM OF ALL RESPONSES TO PHQ QUESTIONS 1-9: 6
SUM OF ALL RESPONSES TO PHQ QUESTIONS 1-9: 6
10. IF YOU CHECKED OFF ANY PROBLEMS, HOW DIFFICULT HAVE THESE PROBLEMS MADE IT FOR YOU TO DO YOUR WORK, TAKE CARE OF THINGS AT HOME, OR GET ALONG WITH OTHER PEOPLE: SOMEWHAT DIFFICULT

## 2023-11-20 ENCOUNTER — VIRTUAL VISIT (OUTPATIENT)
Dept: PSYCHOLOGY | Facility: CLINIC | Age: 83
End: 2023-11-20
Payer: COMMERCIAL

## 2023-11-20 DIAGNOSIS — F33.1 MODERATE EPISODE OF RECURRENT MAJOR DEPRESSIVE DISORDER (H): Primary | ICD-10-CM

## 2023-11-20 PROCEDURE — 90837 PSYTX W PT 60 MINUTES: CPT | Mod: VID

## 2023-11-20 NOTE — PROGRESS NOTES
[facility]                                     Progress Note    Patient Name: Ginger Marshall  Date: 11/20/23         Service Type: Individual      Session Start Time: 9:00 am  Session End Time: 9:55 am     Session Length: 55 min    Session #: 11      Answers submitted by the patient for this visit:  Patient Health Questionnaire (Submitted on 9/26/2023)  If you checked off any problems, how difficult have these problems made it for you to do your work, take care of things at home, or get along with other people?: Somewhat difficult  PHQ9 TOTAL SCORE: 4      Attendees: Client attended alone    Service Modality:  Video Visit:      Provider verified identity through the following two step process.  Patient provided:  Patient is known previously to provider    Telemedicine Visit: The patient's condition can be safely assessed and treated via synchronous audio and visual telemedicine encounter.      Reason for Telemedicine Visit: Patient convenience (e.g. access to timely appointments / distance to available provider)    Originating Site (Patient Location): Patient's home    Distant Site (Provider Location): Provider Remote Setting- Home Office    Consent:  The patient/guardian has verbally consented to: the potential risks and benefits of telemedicine (video visit) versus in person care; bill my insurance or make self-payment for services provided; and responsibility for payment of non-covered services.     Patient would like the video invitation sent by:  My Chart    Mode of Communication:  Video Conference via Amwell    Distant Location (Provider):  Off-site    As the provider I attest to compliance with applicable laws and regulations related to telemedicine.    DATA  Interactive Complexity: No     Crisis: No    Extended Session (53+ minutes): PROLONGED SERVICE IN THE OUTPATIENT SETTING REQUIRING DIRECT (FACE-TO-FACE) PATIENT CONTACT BEYOND THE USUAL SERVICE:    - Longer session due to limited  access to mental health appointments and necessity to address patient's distress / complexity  Interactive Complexity: No  Crisis: No        Progress Since Last Session (Related to Symptoms / Goals / Homework):   Symptoms: No change   , low energy    Homework: Partially completed      Episode of Care Goals: Minimal progress - ACTION (Actively working towards change); Intervened by reinforcing change plan / affirming steps taken     Current / Ongoing Stressors and Concerns:  Distress regarding lack of contact with sister, limited to no contact with son's adult children Low motivation regarding household chores, cognitive barriers to seeking more resourcses. Low quality sleep, even with CPAP-waking up tired, nap in afternoon. Focus on socializing with various [employer] groups.   Ongoing: Grieving loss of adult son who was blind and lived alone fell and passed away, second loss of an adult/child. Noticing regrets about parenting. Distress regarding sister with Alzheimer's who has left local residence and is not in contact.   Treatment Objective(s) Addressed in This Session:    Practice boundaries and radical acceptance of reality regarding sister, sons, reality of accident  Improve quantity and quality of night time sleep / decrease daytime naps      Intervention:  Supportive therapy: Provided active listening and validation. Validated focus on spirituality/Tem[employer] connections/meaning. Explored feelings around family contact  Motivational Interviewing  Target Behavior:  radical acceptance of reality, gratitude , continue seeking help-for food access.     Stage of Change: ACTION (Actively working towards change)    MI Intervention: Expressed Empathy/Understanding, Supported Autonomy, Collaboration, Evocation and Open-ended questions     Change Talk Expressed by the Patient: Desire to change Reasons to change Activation    Provider Response to Change Talk: E - Evoked more info from patient about behavior change and A -  Affirmed patient's thoughts, decisions, or attempts at behavior change    Assessments completed prior to visit:  DA  12/5/22  The following assessments were completed by patient for this visit:  PHQ2:       2/27/2023     8:50 AM 2/24/2023     6:48 AM 2/20/2023     5:39 PM 1/15/2023    11:54 AM 11/16/2022     1:33 PM 8/5/2022     8:11 AM 7/6/2022     4:18 PM   PHQ-2 ( 1999 Pf[employer])   Q1: Little interest or pleasure in doing things 0 1 1 1 1 3    Q2: Feeling down, depressed or hopeless 0 1 1 1 1 0    PHQ-2 Score 0 2 2 2 2 3    Q1: Little interest or pleasure in doing things  Several days Several days Several days Several days Nearly every day    Q2: Feeling down, depressed or hopeless  Several days Several days Several days Several days Not at all    PHQ-2 Score  2 2 2 2 3 Incomplete     PHQ9:       2/21/2023    11:28 AM 2/23/2023     8:40 AM 3/14/2023    12:35 PM 6/5/2023     2:31 PM 8/23/2023     8:30 AM 9/26/2023     9:50 AM 11/19/2023    10:24 AM   PHQ-9 SCORE   PHQ-9 Total Score MyChart  4 (Minimal depression) 2 (Minimal depression)  5 (Mild depression) 4 (Minimal depression) 6 (Mild depression)   PHQ-9 Total Score 5 4 2    2 10 5 4 6     Patient Health Questionnaire (Submitted on 9/26/2023)  If you checked off any problems, how difficult have these problems made it for you to do your work, take care of things at home, or get along with other people?: Somewhat difficult  PHQ9 TOTAL SCORE: 4  GAD2:       1/3/2023    11:27 AM 2/2/2023     7:27 AM 2/23/2023     8:41 AM 3/12/2023    10:36 AM 8/23/2023     8:31 AM 9/23/2023    10:05 AM 11/19/2023    10:24 AM   SPRING-2   Feeling nervous, anxious, or on edge  1 1 1    1 0 0 0   Not being able to stop or control worrying 1 1 0 0    0 0 0 0   SPRING-2 Total Score  2 1 1    1 0 0 0     GAD7:       2/22/2018     1:46 PM 6/5/2019     4:10 PM 6/16/2020     9:54 AM 7/6/2022     4:18 PM 8/5/2022     8:11 AM 11/16/2022     1:33 PM 6/5/2023     2:31 PM   SPRING-7 SCORE   Total Score    2  (minimal anxiety) 5 (mild anxiety) 2 (minimal anxiety)    Total Score 0 0 7 2 5 2 3     CAGE-AID:       12/5/2022     9:28 AM   CAGE-AID Total Score   Total Score 0   Total Score MyChart 0 (A total score of 2 or greater is considered clinically significant)     PROMIS 10-Global Health (only subscores and total score):       9/21/2017    11:00 AM 11/15/2018     1:00 PM 3/25/2022     7:34 AM 12/5/2022     9:28 AM 3/12/2023    10:38 AM 8/23/2023     8:34 AM   PROMIS-10 Scores Only   Global Mental Health Score 13 12 12 8    8 8    8 12   Global Physical Health Score 14 12 12 13    13 11    11 12   PROMIS TOTAL - SUBSCORES 27 24 24 21    21 19    19 24     [place] Suicide Severity Rating Scale (Lifetime/Recent)      12/5/2022    11:00 AM   [place] Suicide Severity Rating (Lifetime/Recent)   Q1 Wished to be Dead (Past Month) no   Q2 Suicidal Thoughts (Past Month) no   Q3 Suicidal Thought Method no   Q4 Suicidal Intent without Specific Plan no   Q5 Suicide Intent with Specific Plan no   Q6 Suicide Behavior (Lifetime) no   Level of Risk per Screen low risk         ASSESSMENT: Current Emotional / Mental Status (status of significant symptoms):   Risk status (Self / Other harm or suicidal ideation)   Patient denies current fears or concerns for personal safety.   Patient denies current or recent suicidal ideation or behaviors.   Patient denies current or recent homicidal ideation or behaviors.   Patient denies current or recent self injurious behavior or ideation.   Patient denies other safety concerns.   Patient reports there has been no change in risk factors since their last session.     Patient reports there has been no change in protective factors since their last session.     Recommended that patient call 911 or go to the local ED should there be a change in any of these risk factors.     Appearance:   Appropriate    Eye Contact:   Good    Psychomotor Behavior: Normal    Attitude:   Pleasant  Attentive   Orientation:   All   Speech    Rate / Production: Emotional Normal     Volume:  Normal    Mood:    Depressed    Affect:    Appropriate    Thought Content:  Clear    Thought Form:  Coherent  Logical  Circumstantial   Insight:    Good      Medication Review:   No changes to current psychiatric medication(s)     Medication Compliance:   Yes     Changes in Health Issues:   None reported     Chemical Use Review:   Substance Use: Chemical use reviewed, no active concerns identified      Tobacco Use: No change in amount of tobacco use since last session.  Patient declined discussion at this time    Diagnosis:  1. Moderate episode of recurrent major depressive disorder (H)      Collateral Reports Completed:   Not Applicable    PLAN: (Patient Tasks / Therapist Tasks / Other)   Embrace radical acceptance and boundaries. Continue using food resources.     Rocío Arenas, [facility] 11/20/2023                                                                                                  Individual Treatment Plan    Patient's Name: Ginger Mrashall  YOB: 1940    Date of Creation: 2/8/23  Date Treatment Plan Last Reviewed/Revised:   9/26/2023      DSM5 Diagnoses: 296.31 (F33.0) Major Depressive Disorder, Recurrent Episode, Mild With anxious distress  Psychosocial / Contextual Factors: aging, losses, generational trauma  PROMIS (reviewed every 90 days):   21  12/5/22    Referral / Collaboration:  Referral to another professional/service is not indicated at this time..    Anticipated number of session for this episode of care: 6-9 sessions  Anticipation frequency of session: Every other week  Anticipated Duration of each session: 38-52 minutes  Treatment plan will be reviewed in 90 days or when goals have been changed.     MeasurableTreatment Goal(s) related to diagnosis / functional impairment(s)  Goal 1: Patient will experience an improvement in mood and functioning as evidenced by assessment scores, self  report and clinician observation    I will know I've met my goal when things feel better (paraphrase).      Objective #A (Patient Action)    Patient will increase understanding of steps in the grief process   Talk to others about losses  Use prayer practices and jena community to support well being.   Practice boundaries and radical acceptance of reality regarding sister sons, reality of accident   Engage in social activities at [facility]  Status: 9/26/2023    Intervention(s)  Therapist will teach CBT, DBT  and support grief processing skills, prayer practices .    Objective #B  Patient will Increase interest, engagement, and pleasure in doing things  Decrease frequency and intensity of feeling down, depressed, hopeless  Improve quantity and quality of night time sleep / decrease daytime naps  Feel less tired and more energy during the day   Improve diet, appetite, mindful eating, and / or meal planning  Identify negative self-talk and behaviors: challenge core beliefs, myths, and actions  Improve concentration, focus, and mindfulness in daily activities   Feel less fidgety, restless or slow in daily activities / interpersonal interactions.  Status: 9/26/2023      Intervention(s)  Therapist will  teach cbt, dbt,MI, mindfulness and behavioral activation and assign homework .      Patient has reviewed and agreed to the above plan.      Rocío Arenas, [facility]  9/26/2023

## 2023-11-26 NOTE — COMMUNITY RESOURCES LIST (ENGLISH)
11/26/2023   [facility]  N/A  For questions about this resource list or additional care needs, please contact your primary care clinic or care manager.  Phone: [phone]   Email: N/A   Address: [address] [place] [address]   Hours: N/A        Food and Nutrition       Food pantry  1  [address] [place] [address] Distance: 0.79 miles      In-Person, Pickup   [address] [place] [address]  Language: English  Hours: Mon 8:00 AM - 4:00 PM , Tue 8:00 AM - 7:00 PM , Wed - Thu 8:00 AM - 4:00 PM  Fees: Free   Phone: (228) 669-8291 Email: info@[employer] Website: https://[employer]/resources/resource-centers/     2  [facility] Distance: 2.32 miles      In-Person   [address] [place] [address]  Language: English, Indonesian  Hours: Mon - Fri 5:00 AM - 10:00 PM  Fees: Free   Phone: (727) 778-3345 Email: admissions@[facility] Website: http://www.[facility]     SNAP application assistance  3  [employer] Partnership ([facility]) [facility] Distance: 0.5 miles      In-Person   [address], [place] [address]  Language: English, Indonesian  Hours: Mon - Fri 8:00 AM - 8:00 PM  Fees: Free   Phone: (862) 587-3175 Email: info@[facility].org Website: http://www.capagenFunium.org     4  [address] [place] [address] Distance: 0.79 miles      In-Person   [address] [place] [address]  Language: English  Hours: Mon 8:00 AM - 4:00 PM , Tue 8:00 AM - 7:00 PM , Wed - Thu 8:00 AM - 4:00 PM  Fees: Free   Phone: (946) 740-8066 Email: info@[employer] Website: https://[employer]/resources/resource-centers/     Soup kitchen or free meals  5  [facility] Distance: 4.2 miles      Pickup   [address] [place] [address]  Language: English, Indonesian  Hours: Mon - Thu 5:30 PM - 6:30 PM  Fees: Free   Phone:  (983) 266-2391 Email: missy@[employer] Website: http://[employer]/wordpress/?page_id=5168     6  [facility] and [facility] Distance: 9.29 miles      In-Person, Pickup   [address] [place] [address]  Language: English  Hours: Mon - Thu 5:00 PM - 6:30 PM  Fees: Free   Phone: (851) 666-9893 Email: office@[employer] Website: http://[employer]/          Important Numbers & Websites       Emergency Services   911  [facility]   311  Poison Control   (572) 983-3094  Suicide Prevention Lifeline   (557) 637-3981 (TALK)  Child Abuse Hotline   (379) 709-6965 (4-A-Child)  Sexual Assault Hotline   (731) 113-9038 (HOPE)  National Runaway Safeline   (785) 475-9787 (RUNAWAY)  All-Options Talkline   (831) 123-8359  Substance Abuse Referral   (748) 317-9944 (HELP)

## 2023-11-27 ASSESSMENT — PATIENT HEALTH QUESTIONNAIRE - PHQ9
SUM OF ALL RESPONSES TO PHQ QUESTIONS 1-9: 6
SUM OF ALL RESPONSES TO PHQ QUESTIONS 1-9: 6

## 2023-11-27 NOTE — COMMUNITY RESOURCES LIST (ENGLISH)
11/27/2023   [facility]  N/A  For questions about this resource list or additional care needs, please contact your primary care clinic or care manager.  Phone: [phone]   Email: N/A   Address: [address] [place] [address]   Hours: N/A        Food and Nutrition       Food pantry  1  [address] [place] [address] Distance: 0.79 miles      In-Person, Pickup   [address] [place] [address]  Language: English  Hours: Mon 8:00 AM - 4:00 PM , Tue 8:00 AM - 7:00 PM , Wed - Thu 8:00 AM - 4:00 PM  Fees: Free   Phone: (281) 231-8984 Email: info@[employer] Website: https://[employer]/resources/resource-centers/     2  [facility] Distance: 2.32 miles      In-Person   [address] [place] [address]  Language: English, Hebrew  Hours: Mon - Fri 5:00 AM - 10:00 PM  Fees: Free   Phone: (194) 807-2636 Email: admissions@[facility] Website: http://www.[facility]     SNAP application assistance  3  Community Action Partnership ([facility]) [facility] Distance: 0.5 miles      In-Person   [address], [place] [address]  Language: English, Hebrew  Hours: Mon - Fri 8:00 AM - 8:00 PM  Fees: Free   Phone: (871) 646-5248 Email: info@[facility].org Website: http://www.capagenVessix.org     4  [address] [place] [address] Distance: 0.79 miles      In-Person   [address] [place] [address]  Language: English  Hours: Mon 8:00 AM - 4:00 PM , Tue 8:00 AM - 7:00 PM , Wed - Thu 8:00 AM - 4:00 PM  Fees: Free   Phone: (454) 360-7453 Email: info@[employer] Website: https://[employer]/resources/resource-centers/     Soup kitchen or free meals  5  [facility] Distance: 4.2 miles      Pickup   [address] [place] [address]  Language: English, Hebrew  Hours: Mon - Thu 5:30 PM - 6:30 PM  Fees: Free   Phone:  (967) 532-7039 Email: missy@[employer] Website: http://[employer]/wordpress/?page_id=5168     6  [facility] and [facility] Distance: 9.29 miles      In-Person, Pickup   [address] [place] [address]  Language: English  Hours: Mon - Thu 5:00 PM - 6:30 PM  Fees: Free   Phone: (622) 884-3767 Email: office@[employer] Website: http://OurStay/          Important Numbers & Websites       Emergency Services   911  [facility]   311  Poison Control   (652) 666-2970  Suicide Prevention Lifeline   (547) 603-7495 (TALK)  Child Abuse Hotline   (821) 189-3339 (4-A-Child)  Sexual Assault Hotline   (167) 641-7124 (HOPE)  National Runaway Safeline   (588) 832-4119 (RUNAWAY)  All-Options Talkline   (234) 462-5624  Substance Abuse Referral   (243) 254-8251 (HELP)

## 2023-11-28 ENCOUNTER — OFFICE VISIT (OUTPATIENT)
Dept: FAMILY MEDICINE | Facility: CLINIC | Age: 83
End: 2023-11-28
Payer: COMMERCIAL

## 2023-11-28 VITALS
HEIGHT: 61 IN | TEMPERATURE: 97.7 F | SYSTOLIC BLOOD PRESSURE: 122 MMHG | BODY MASS INDEX: 38.51 KG/M2 | WEIGHT: 204 LBS | OXYGEN SATURATION: 96 % | DIASTOLIC BLOOD PRESSURE: 73 MMHG | RESPIRATION RATE: 16 BRPM | HEART RATE: 95 BPM

## 2023-11-28 DIAGNOSIS — B02.9 HERPES ZOSTER WITHOUT COMPLICATION: Primary | ICD-10-CM

## 2023-11-28 PROCEDURE — 99214 OFFICE O/P EST MOD 30 MIN: CPT | Performed by: STUDENT IN AN ORGANIZED HEALTH CARE EDUCATION/TRAINING PROGRAM

## 2023-11-28 RX ORDER — RESPIRATORY SYNCYTIAL VIRUS VACCINE 120MCG/0.5
0.5 KIT INTRAMUSCULAR ONCE
Qty: 1 EACH | Refills: 0 | Status: CANCELLED | OUTPATIENT
Start: 2023-11-28 | End: 2023-11-28

## 2023-11-28 RX ORDER — PREDNISONE 20 MG/1
40 TABLET ORAL DAILY
Qty: 10 TABLET | Refills: 0 | Status: SHIPPED | OUTPATIENT
Start: 2023-11-28 | End: 2023-11-29

## 2023-11-28 ASSESSMENT — PAIN SCALES - GENERAL: PAINLEVEL: WORST PAIN (10)

## 2023-11-28 NOTE — PROGRESS NOTES
"  Assessment & Plan     Herpes zoster without complication  Sxs for past week, new lesions developing. In left C2 distribution. Already on valacylovir, taking consistently. Noting ear pain that is preventing her from sleeping. Will trial prednisone (to take in AM). If pain not improved, plan to stop prednisone and start small, short script of oxycodone. No current symptoms of [place] Hunt syndrome nor herpes zoster ophthalmicus. Discussed red flag symptoms to be aware of that should prompt patient to be seen in office including eye pain, lesions around eye, decreased vision, decreased hearing, facial numbness/paralysis or worsening symptoms.  - predniSONE (DELTASONE) 20 MG tablet  Dispense: 10 tablet; Refill: 0    Follow up in 2-3 days as needed    Duy Leyva MD  [facility]  11/28/2023    Alejandro Miles is a 83 year old, presenting for the following health issues:  Follow Up (Ear and neck pain)        11/28/2023     9:04 AM   Additional Questions   Roomed by Carolyn CUNNINGHAM     History of Present Illness       Reason for visit:  Follow up  Symptom onset:  3-7 days ago  Symptoms include:  Pain,  rash,  Symptom intensity:  Severe  Symptom progression:  Staying the same  Had these symptoms before:  Yes  Has tried/received treatment for these symptoms:  Yes  Previous treatment was successful:  No  What makes it worse:  No  What makes it better:  No    She eats 0-1 servings of fruits and vegetables daily.She consumes 0 sweetened beverage(s) daily.She exercises with enough effort to increase her heart rate 9 or less minutes per day.  She exercises with enough effort to increase her heart rate 4 days per week.   She is taking medications regularly.     Hospital Follow-up Visit:    [facility]  Date of Admission: 11/24/23  Date of Discharge: 11/24/23  Reason(s) for Admission: Pain on L ear and neck, comes and goes like it's \"blinking\"  Was " "your hospitalization related to COVID-19? No   Problems taking medications regularly:  None  Medication changes since discharge: None  Problems adhering to non-medication therapy:  None    Summary of hospitalization:  [facility] hospital discharge summary reviewed  Diagnostic Tests/Treatments reviewed.  Follow up needed: none  Other Healthcare Providers Involved in Patient s Care:         None  Update since discharge: worsening/fluctuating course.     Shingles  Sxs started one week ago with pain over L scalp and pain over the earlobe.   Ongoing for a few days.   Went to  on Friday 11/24/23 and diagnosed with shingles and given valacyclovir to start.  Since then, she has developed new lesions over the chin on Sunday and now today lesions over the L earlobe.  Has been having sharp/stabbing pain over left scalp and within the ear too.  Has not been able to sleep due to the ongoing pain.  No changes in facial sensation, no facial droop. Normal taste, tongue movement. No slurring speech, difficulty swallowing. No vision changes at all. No eye pain. No diminished hearing. No vertigo or dizziness. No significant headache. No confusion.         Objective    /73 (BP Location: Right arm, Patient Position: Sitting, Cuff Size: Adult Large)   Pulse 95   Temp 97.7  F (36.5  C) (Oral)   Resp 16   Ht 1.549 m (5' 1\")   Wt 92.5 kg (204 lb)   SpO2 96%   BMI 38.55 kg/m    Body mass index is 38.55 kg/m .    Physical Exam   GENERAL: healthy, alert and no distress  HEAD: Normocephalic, atraumatic.   EYES: PERRL. Normal conjunctivae, sclera.   ENT: Normal EAC and TMs bilaterally. 1cm erythematous patch over L inferior pinna (lobe). Normal oropharynx.   NECK: Supple. No lymphadenopathy appreciated. Trachea midline. Thyroid not enlarged, not TTP.  RESP: lungs clear to auscultation - no rales, rhonchi or wheezes  CV: regular rate and rhythm, normal S1 S2, no murmur, click, rub or gallop. No carotid bruits. No peripheral " swelling noted.   ABDOMEN: soft, no TTP x4 quadrants. No hepatomegaly or masses appreciated. BS normactive.  MSK: no gross musculoskeletal defects noted.  SKIN: 2-3 cm patch of papules on erythematous base over midline anterior neck, just below chin. Scattered erythematous lesions over L posterior scalp in C2 distribution.   EXT: Warm and well perfused.   NEURO: CNII-XII intact bilaterally. No focal deficits.  PSYCH: Groomed, dressed appropriately for weather. Normal mood with consistent affect.

## 2023-11-29 ENCOUNTER — MYC MEDICAL ADVICE (OUTPATIENT)
Dept: FAMILY MEDICINE | Facility: CLINIC | Age: 83
End: 2023-11-29
Payer: COMMERCIAL

## 2023-11-29 DIAGNOSIS — B02.9 HERPES ZOSTER WITHOUT COMPLICATION: Primary | ICD-10-CM

## 2023-11-29 RX ORDER — OXYCODONE HYDROCHLORIDE 5 MG/1
2.5-5 TABLET ORAL EVERY 6 HOURS PRN
Qty: 5 TABLET | Refills: 0 | Status: SHIPPED | OUTPATIENT
Start: 2023-11-29 | End: 2023-12-02

## 2023-11-29 NOTE — TELEPHONE ENCOUNTER
"See patient's [employer] message   - Patient states that the prednisone dose caused her to have a lot of GERD last night   - Patient requesting to try oxycodone      Upon chart review:   - The patient was seen by Dr. Leyva yesterday (11/28/2023)   - 11/28/2023 Office Visit note with Dr. Leyva states, \"Herpes zoster without complication  Sxs for past week, new lesions developing. In left C2 distribution. Already on valacylovir, taking consistently. Noting ear pain that is preventing her from sleeping. Will trial prednisone (to take in AM). If pain not improved, plan to stop prednisone and start small, short script of oxycodone. No current symptoms of [place] Hunt syndrome nor herpes zoster ophthalmicus. Discussed red flag symptoms to be aware of that should prompt patient to be seen in office including eye pain, lesions around eye, decreased vision, decreased hearing, facial numbness/paralysis or worsening symptoms.  - predniSONE (DELTASONE) 20 MG tablet  Dispense: 10 tablet; Refill: 0\"     predniSONE (DELTASONE) 20 MG tablet 10 tablet 0 11/28/2023 12/3/2023 No   Sig - Route: Take 2 tablets (40 mg) by mouth daily for 5 days - Oral     - Pended oxycodone 5 mg     Dr. Leyva, please review, advise, and order as appropriate.     Demetria CORNELL RN   [facility]   "

## 2023-11-29 NOTE — TELEPHONE ENCOUNTER
Called pt and advised of below from Dr Duy Leyva.  Pt verbalized understanding and agreeable to plan.  Had no further questions.    Lashonda Ly RN, BSN  [facility]

## 2023-11-29 NOTE — TELEPHONE ENCOUNTER
Please call patient and let her know that I have sent in the prescription for oxycodone.     Please have her stop the prednisone prescription. Should be taking scheduled NSAID and acetaminophen. IF she has breakthrough, severe pain despite NSAID and acetaminophen use, then ok to try 1/2 tablet to 1 tablet of oxycodone. Can cause sedation and increased risk of falls on medication so should limit as able. Do not drive or operate heavy machinery on medication.     Thanks,    Duy Leyva MD  [facility]  11/29/2023

## 2023-12-01 ENCOUNTER — VIRTUAL VISIT (OUTPATIENT)
Dept: RHEUMATOLOGY | Facility: CLINIC | Age: 83
End: 2023-12-01
Payer: COMMERCIAL

## 2023-12-01 DIAGNOSIS — M05.79 RHEUMATOID ARTHRITIS INVOLVING MULTIPLE SITES WITH POSITIVE RHEUMATOID FACTOR (H): Primary | ICD-10-CM

## 2023-12-01 DIAGNOSIS — Z79.899 HIGH RISK MEDICATION USE: ICD-10-CM

## 2023-12-01 DIAGNOSIS — M81.0 OSTEOPOROSIS, UNSPECIFIED OSTEOPOROSIS TYPE, UNSPECIFIED PATHOLOGICAL FRACTURE PRESENCE: ICD-10-CM

## 2023-12-01 PROCEDURE — 99214 OFFICE O/P EST MOD 30 MIN: CPT | Mod: VID | Performed by: INTERNAL MEDICINE

## 2023-12-01 RX ORDER — METHOTREXATE 25 MG/.5ML
25 INJECTION, SOLUTION SUBCUTANEOUS
Qty: 2 ML | Refills: 6 | Status: SHIPPED | OUTPATIENT
Start: 2023-12-01 | End: 2024-05-17

## 2023-12-01 RX ORDER — UPADACITINIB 15 MG/1
15 TABLET, EXTENDED RELEASE ORAL DAILY
Qty: 30 TABLET | Refills: 6 | Status: SHIPPED | OUTPATIENT
Start: 2023-12-01 | End: 2024-05-17

## 2023-12-01 NOTE — PATIENT INSTRUCTIONS
RHEUMATOLOGY    [facility] [place]  [address] [place] [address]  [place] [address]    Phone number: [phone]  Fax number: [phone]    If you need a medication refill, please contact us as you may need lab work and/or a follow up visit prior to your refill.      Thank you for choosing [facility]!    Samantha Morales CMA Rheumatology

## 2023-12-01 NOTE — PROGRESS NOTES
Ginger Marshall  is a 83 year old year old who is being evaluated via a billable video visit.      How would you like to obtain your AVS? MyChart  If the video visit is dropped, the invitation should be resent by: Text to cell phone: [phone]   Will anyone else be joining your video visit? No      Rheumatology Video Visit      Ginger Marshall MRN# [id number]   YOB: 1940 Age: 83 year old      Date of visit: 12/01/23   PCP: Dr. Duy Leyva    Chief Complaint   Patient presents with:  Rheumatoid Arthritis    Assessment and Plan     1. Seropositive Erosive Rheumatoid Arthritis ( [2009], CCP >100 [2009]; hx of rheumatoid nodule by 6/14/2011 pathology): Previously failed remicade (staph infection during therapy, but was immediately after a joint injection), orencia (migraines), HCQ (ineffective), Xeljanz (partially effective).  Sulfa allergy.  Currently on methotrexate 25 mg SQ once weekly (dose reduction in the past resulted in more symptoms), leucovorin 5 mg weekly (resolved nasal sore that didn't improve with higher daily folic acid doses), and Rinvoq 15mg daily.  RA well controlled since changing to Rinvoq.  Chronic illness, stable.    - Continue methotrexate 25mg SQ once weekly (Rasuvo)  - Continue leucovorin 5 mg every 7 days, 24 hours after MTX dose.   - Restart Rinvoq 15 mg daily only after all shingles lesions have crusted over and no new lesions are being formed  - Labs now: CBC, Creatinine, Hepatic Panel  - Labs in 8-12 weeks: CBC, Creatinine, Hepatic Panel, ESR, CRP, QuantiFERON-TB gold plus    High risk medication requiring intensive toxicity monitoring at least quarterly    2. 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  Doing okay at this time.     3. Right hip pain: Status post WALTER twice in the past. Followed by [facility] orthopedics as needed.  Symptoms are degenerative in nature.  Reportedly doing well at this time per  patient    4. 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 Michele who is now at [facility].  Has been seen at [facility] and [place] [facility].  She previously reported that a TENS unit was helpful, but reported that it was not helpful.  She does not want additional lumbar spine injections or back surgery.  She may follow-up with her spine surgeon or primary care provider for this issue, as needed.    5.  Thoracic back pain: Degenerative in nature.  And degenerative changes seen on 2021 chest CT.  Continue physical therapy exercises at home, as these are helpful.    6. Osteoporosis: was previously managed by endocrinology and she received reclast once in 2013, 2014, 2016. 12/12/2018 DEXA ordered by Dr. Vázquez showed osteoporosis.  Repeat DEXA on 2/2/2022 showed osteoporosis; no significant change of the hips; forearm osteoporosis but no previous evaluation of the forearm for comparison.  Reclast was restarted after sufficient drug holiday, with the first dose on 3/18/2021; again received in 3/21/2022 and 3/21/2023.  Plan to recheck DEXA in February 2024.   Chronic illness, stable.      - Reclast once yearly; next due on 3/21/2024  - Continue vitamin D 1000 IU daily  - Continue calcium 1200mg daily from supplement and dietary sources  - Plan to recheck DEXA in February 2024; this has been scheduled    7. Left 1st toe pain, history: 2 episodes of sudden onset left toe pain followed by diffuse left foot pain that made ambulation difficult.  Also with nodules over both Achilles tendons and the right elbow that are either rheumatoid nodules or tophi.  She reports having history of gout decades ago.  Denies ever being on colchicine or allopurinol.  Discussed colchicine to use if suspected gout flare occurs.  No flares since last seen so has not used colchicine.  - Colchicine: (MITIGARE) 0.6 MG capsule; Take 2 capsules (1.2 mg) by mouth once for 1 dose  at the onset of a gout flare, followed by 1 capsule (0.6mg) 1 hour later.      8. Right 1st toe fracture, and left wrist fracture: following with orthopedic surgery and podiatry.  If surgery: hold rinvoq for 5 days prior to surgery and then restart no sooner than 14 days after surgery and only in the absence of infection and delayed wound healing.     8. Chronic pain syndrome: Documented here for historical significance only.   Management per pain clinic and/or PCP    9.  Vaccinations: Vaccinations reviewed with Ms. Marshall.  Risks and benefits of vaccinations were discussed.    - Influenza: encouraged yearly vaccination, and to hold methotrexate for 1-2 weeks afterward  - [id number]: up to date  - [id number]: up to date  - Shingrix: Up to date   - COVID-19: Advised keeping updated, and to hold methotrexate and Rinvoq for 1-2 weeks afterward    10.  Obstructive sleep apnea, fatigue, poor sleep: Uses a CPAP.  Not sleeping well through the night.  Reportedly a Fitbit shows poor sleep.  She would like to be reevaluated in the sleep medicine clinic  - Sleep medicine referral    11.  Shingles: Patient reports no new lesions but all lesions have crusted over.  She is under treatment from her primary care provider's office.  Discussed holding Rinvoq during any infection and verbalized understanding.  Rinvoq is not to be restarted until all lesions have crusted over and no new lesions are forming.    Total minutes spent in evaluation with patient, documentation, , and review of pertinent studies and chart notes: 14     Ms. Marshall verbalized agreement with and understanding of the rational for the diagnosis and treatment plan.  All questions were answered to best of my ability and the patient's satisfaction. Ms. Marshall was advised to contact the clinic with any questions that may arise after the clinic visit.      Thank you for involving me in the care of the patient    Return to clinic: February.  She will be  coming to clinic for her DEXA scan and would like to have her rheumatology follow-up appointment on the same day.  This has been arranged.    HPI   Ginger Marshall is a 83 year old female with a history of multiple foot surgeries, hand tendon transfers, MCP replacements (most recent being in August 2015), bilateral TKA, right WALTER, left distal radius fracture history, gout, s/p lumbar fusion, osteoporosis and seropositive (RF+, CCP+) erosive rheumatoid arthritis who presents for follow-up of rheumatoid arthritis.      Today, 12/1/2023: RA controlled.  However, developed shingles and she states that there are no new lesions forming but all current lesions have not crusted over yet.  Is not holding Rinvoq but will hold Rinvoq now.  Following with her primary care provider for shingles management.    Denies fevers, chills, nausea, vomiting, constipation, diarrhea. No abdominal pain. No chest pain/pressure, palpitations, or shortness of breath. No oral or nasal sores.   No LE swelling.     Tobacco: quit in 1999  EtOH: 1 glass of wine every month at most  Drugs: None  Occupation: , retired     ROS   12 point review of system was completed and negative except as noted in the HPI     Active Problem List     Patient Active Problem List   Diagnosis    Rheumatoid arthritis involving right hand with positive rheumatoid factor (H)    History of colonic polyps    Multinodular goiter    Pulmonary nodule    PSEUDOPHAKIA OU    PVD (POSTERIOR VITREOUS DETACHMENT) OU    PXF (PSEUDOEXFOLIATION OF LENS CAPSULE) OD    GERD (gastroesophageal reflux disease)    Hyperlipidemia LDL goal <100    Advance Care Planning    Neuropathy    SHERRY (obstructive sleep apnea)-severe (AHI 35)    zEncounter for counseling    Anemia of chronic disease    Osteoporosis    Cornea guttata, ou    Conjunctival concretions    Stenosis, spinal, lumbar    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    MGD (meibomian gland dysfunction)     Prediabetes    Chronic bilateral low back pain without sciatica    Allergic state, subsequent encounter    Anxiety    DDD (degenerative disc disease), lumbar    Chronic right shoulder pain    Moderate episode of recurrent major depressive disorder (H)    Rheumatic mitral stenosis    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    History of bisphosphonate therapy    Morbid obesity (H)    Immunocompromised (H24)    Thoracic spine pain    Other closed intra-articular fracture of distal end of left radius, initial encounter    Closed fracture of shaft of left ulna, unspecified fracture morphology, initial encounter    Closed fracture of olecranon process of right ulna with routine healing     Past Medical History     Past Medical History:   Diagnosis Date    Acute posthemorrhagic anemia 10/13/2012    Closed fracture of multiple ribs of left side, initial encounter 11/25/2019    COPD (chronic obstructive pulmonary disease) (H) 1980's    no longer occurring    Ex-smoker 01/1999    Gastroenteritis 03/21/2020    Gastroenteritis with norovirus    Herniated nucleus pulposus, L3-4 3/1/2017    Hip joint replacement by other means 07/10/2008    History of blood transfusion     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    History of total knee replacement 07/23/2009    Menopause 1989    late 40's    Migraine 04/27/2014    resolved    Multinodular goiter     Other chronic pain     joints    Pelvic fracture (H) 05/13/2014    Rheumatic mitral stenosis     Rheumatoid arteritis (H)     S/P lumbar fusion 04/03/2017    Sleep apnea     Vitamin B12 deficiency      Past Surgical History     Past Surgical History:   Procedure Laterality Date    ABDOMEN SURGERY      c sections    ARTHROSCOPY KNEE Left     ARTHROSCOPY KNEE RT/LT  01/2006    left    BACK SURGERY  2013    disc    BIOPSY BREAST      BREAST SURGERY  1995    lumpectomy 90's    BREAST SURGERY      lumpectomy    CATARACT EXTRACTION Bilateral     CATARACT  IOL, RT/LT Bilateral 2010 aproximately     SECTION  1966     SECTION  1972    COLONOSCOPY  2009,    COLONOSCOPY      FINGER SURGERY Right 2019    Procedure: DISTAL ULNA RESECTION, RIGHT MIDDLE SILASTIC METACARPALPHALANGEAL EXCHANGE AND RIGHT ELBOW NODULE EXCISION.;  Surgeon: Hilario Redmond MD;  Location: [facility];  Service: Orthopedics    FOOT SURGERY Left     GYN SURGERY  [age]    HAND SURGERY Right     HAND SURGERY Right     HC ESOPH/GAS REFLUX TEST W NASAL IMPED >1 HR  2012    Procedure:ESOPHAGEAL IMPEDENCE FUNCTION TEST WITH 24 HOUR PH GREATER THAN 1 HOUR; Surgeon:KAYKAY JULIO; Location: GI    IR LUMBAR EPIDURAL STEROID INJECTION      IR TRANSLAMINAR EPIDURAL LUMBAR INJ INCL IMAGING  2012    LESI L5-S1 at [facility]    LUMBAR FUSION      OPEN REDUCTION INTERNAL FIXATION ELBOW Right 2023    Procedure: OPEN REDUCTION INTERNAL FIXATION, RIGHT OLECRANON FRACTURE;  Surgeon: Pili Brock MD;  Location:  OR    OPEN REDUCTION INTERNAL FIXATION WRIST Left 2023    Procedure: OPEN REDUCTION INTERNAL FIXATION, LEFT DISTAL RADIUS FRACTURE;  Surgeon: Pili Brock MD;  Location:  OR    OPTICAL TRACKING SYSTEM FUSION POSTERIOR SPINE LUMBAR N/A 2017    Procedure: OPTICAL TRACKING SYSTEM FUSION SPINE POSTERIOR LUMBAR ONE LEVEL;  Surgeon: Anthony Michele MD;  Location:  OR    ORTHOPEDIC SURGERY      left foot surgery    ORTHOPEDIC SURGERY      R MCP surgery    ORTHOPEDIC SURGERY      right knee total replacement    TOTAL HIP ARTHROPLASTY Right     TOTAL KNEE ARTHROPLASTY Left     TOTAL KNEE ARTHROPLASTY Right     ZZC ANESTH,TOTAL HIP ARTHROPLASTY      Rt hip    ZZC HAND/FINGER SURGERY UNLISTED  2005    right hand    ZZC TOTAL KNEE ARTHROPLASTY  2006    left     Allergy     Allergies   Allergen Reactions    Abatacept Other (See Comments)     Severe headaches  Migraine    Celebrex [Roche-2 Inhibitors]  "     Ineffective    Celecoxib Unknown and Nausea    Levaquin [Levofloxacin] Fatigue     Severe body pain    Orencia [Abatacept]      Headache    Septra [Bactrim]     Sulfa Antibiotics Nausea and Vomiting     \"deathly ill\"  Allergic to everything with Sulfa in it.    Sulfamethoxazole-Trimethoprim Nausea and Nausea and Vomiting    Tramadol Other (See Comments)     Headache  Migraine headache    Valdecoxib Unknown and Nausea     Made me \"very very ill\", might of been \"cramping\"    Adhesive Tape Rash     Plastic tape  Plastic tapes    Antihistamines, Chlorpheniramine-Type  [Antihistamines, Chlorpheniramine-Type] Anxiety and Other (See Comments)     Current Medication List     Current Outpatient Medications   Medication Sig    acetaminophen (TYLENOL) 325 MG tablet Take 2 tablets (650 mg) by mouth every 6 hours as needed for mild pain Max tylenol 3000 mg in 24 hours    albuterol (PROAIR HFA/PROVENTIL HFA/VENTOLIN HFA) 108 (90 Base) MCG/ACT inhaler Inhale 2 puffs into the lungs every 6 hours as needed for shortness of breath, wheezing or cough    atorvastatin (LIPITOR) 40 MG tablet Take 1 tablet (40 mg) by mouth daily    busPIRone (BUSPAR) 10 MG tablet Take 1 tablet (10 mg) by mouth 2 times daily    CALCIUM CITRATE PO Take 300 mg by mouth daily Take with meal that contains least amount of calcium    carboxymethylcellulose PF (REFRESH PLUS) 0.5 % ophthalmic solution Place 1 drop into both eyes 2 times daily as needed for dry eyes    Cholecalciferol (VITAMIN D-3 PO) Take 2,000 Units by mouth daily    desvenlafaxine (PRISTIQ) 100 MG 24 hr tablet Take 1 tablet (100 mg) by mouth daily    Ferrous Gluconate 324 (37.5 Fe) MG TABS Take 1 tablet by mouth daily Started 6/2023    guaiFENesin (MUCINEX) 600 MG 12 hr tablet Take 1 tablet (600 mg) by mouth 2 times daily    hydrocortisone 2.5 % cream Apply topically 2 times daily For up to two weeks    ipratropium (ATROVENT) 0.06 % nasal spray 2 sprays in each nostril, 2-3 times per day as " needed for rhinitis    leucovorin (WELLCOVORIN) 5 MG tablet Take 1 tablet (5 mg) by mouth every 7 days . Take 24 hours after taking Methotrexate each week.    loratadine (CLARITIN) 10 MG tablet Take 1 tablet (10 mg) by mouth 2 times daily    meclizine (ANTIVERT) 25 MG tablet Take 1 tablet (25 mg) by mouth 3 times daily as needed for dizziness    Methotrexate, PF, (RASUVO) 25 MG/0.5ML autoinjector Inject 0.5 mLs (25 mg) Subcutaneous every 7 days . Hold for signs of infection, and seek medical attention. Take every Thursday.    naproxen sodium (ANAPROX) 220 MG tablet Take 2 tablets (440 mg) by mouth daily as needed for moderate pain (4-6) (less than monthly use currently) (Patient taking differently: Take 440 mg by mouth daily as needed for moderate pain (using a couple times a month))    olopatadine (PATADAY) 0.2 % ophthalmic solution Place 1 drop into both eyes daily as needed Uses mostly in Spring    oxyCODONE (ROXICODONE) 5 MG tablet Take 0.5-1 tablets (2.5-5 mg) by mouth every 6 hours as needed for breakthrough pain    pantoprazole (PROTONIX) 40 MG EC tablet Take 1 tablet (40 mg) by mouth every morning (before breakfast)    sucralfate (CARAFATE) 1 GM tablet Take 1 tablet (1 g) by mouth 4 times daily as needed for nausea (reflux) TAKE ONE TABLET BY MOUTH FOUR TIMES A DAY AS NEEDED HEARTBURN    upadacitinib ER (RINVOQ) 15 MG tablet Take 1 tablet (15 mg) by mouth daily    vitamin C (ASCORBIC ACID) 1000 MG TABS Take 1 tablet (1,000 mg) by mouth daily    zoledronic Acid (RECLAST) 5 MG/100ML SOLN infusion Inject 5 mg into the vein once Every year (next dose March 2023)    zinc gluconate 50 MG tablet Take 1 tablet (50 mg) by mouth daily (Patient not taking: Reported on 7/31/2023)     No current facility-administered medications for this visit.     Social History   See HPI    Family History     Family History   Problem Relation Age of Onset    Arthritis Mother     Alzheimer Disease Mother     Hyperlipidemia Mother      "Osteoporosis Mother     Heart Disease Father         MI ( from this)    Alcohol/Drug Father     Arthritis Sister     Hypertension Sister     Other Cancer Sister         Luekemia    Cancer Son     Diabetes Son         type 1    Neurologic Disorder Sister         Schizophrenic    Hypertension Sister     Hyperlipidemia Sister     Mental Illness Sister     Diabetes Son     Other Cancer Son     Substance Abuse Son     Glaucoma Daughter     Diabetes Other         type 2    Hyperlipidemia Other      No change in family history since the previous clinic visit.    Physical Exam     Temp Readings from Last 3 Encounters:   23 97.7  F (36.5  C) (Oral)   10/26/23 98.4  F (36.9  C) (Oral)   23 98  F (36.7  C) (Oral)     BP Readings from Last 5 Encounters:   23 122/73   10/26/23 132/74   23 116/72   23 111/68   23 (!) 143/78     Pulse Readings from Last 1 Encounters:   23 95     Resp Readings from Last 1 Encounters:   23 16     Estimated body mass index is 38.55 kg/m  as calculated from the following:    Height as of 23: 1.549 m (5' 1\").    Weight as of 23: 92.5 kg (204 lb).      GEN: alert and no distress  PSYCH: Alert; coherent speech, normal rate and volume, able to articulate logical thoughts, able   to abstract reason, no tangential thoughts. Normal affect.   RESP: No cough, no audible wheezing, able to talk in full sentences    Labs     CBC  Recent Labs   Lab Test 23  1005 23  1329 23  1008 21  1054 05/10/21  1023 21  1019 20  1028   WBC 4.2 4.8 5.2   < > 5.0 5.7 5.7   RBC 3.85 4.03 3.55*   < > 3.39* 3.81 3.91   HGB 11.4* 11.0* 10.1*   < > 11.3* 12.5 12.0   HCT 35.5 36.4 33.0*   < > 35.6 38.9 37.8   MCV 92 90 93   < > 105* 102* 97   RDW 19.0* 17.7* 17.1*   < > 14.3 15.2* 16.1*    397 419   < > 333 335 361   MCH 29.6 27.3 28.5   < > 33.3* 32.8 30.7   MCHC 32.1 30.2* 30.6*   < > 31.7 32.1 31.7   NEUTROPHIL 57 58 60   < > " 57.7 65.8 60.5   LYMPH 25 28 21   < > 18.5 17.0 20.4   MONOCYTE 14 11 15   < > 17.3 12.1 12.8   EOSINOPHIL 3 3 3   < > 6.3 4.9 6.1   BASOPHIL 0 0 0   < > 0.2 0.2 0.2   ANEU  --   --   --   --  2.9 3.8 3.5   ALYM  --   --   --   --  0.9 1.0 1.2   MARYURI  --   --   --   --  0.9 0.7 0.7   AEOS  --   --   --   --  0.3 0.3 0.4   ABAS  --   --   --   --  0.0 0.0 0.0   ANEUTAUTO 2.4 2.7 3.1   < >  --   --   --    ALYMPAUTO 1.0 1.4 1.1   < >  --   --   --    AMONOAUTO 0.6 0.5 0.8   < >  --   --   --    AEOSAUTO 0.1 0.2 0.2   < >  --   --   --    ABSBASO 0.0 0.0 0.0   < >  --   --   --     < > = values in this interval not displayed.     CMP  Recent Labs   Lab Test 08/22/23  1005 06/20/23  1329 04/25/23  1008 02/22/23  1018 02/12/23  1633 08/18/21  1054 05/10/21  1023 03/18/21  1350 02/16/21  1019 12/18/20  0923 11/16/20  1028   NA  --  142  --  142 136   < >  --   --   --   --   --    POTASSIUM  --  3.5  --  4.0 3.8   < >  --   --   --   --   --    CHLORIDE  --  105  --  103 102   < >  --   --   --   --   --    CO2  --  24  --  25 25   < >  --   --   --   --   --    ANIONGAP  --  13  --  14 9   < >  --   --   --   --   --    GLC  --  123*  --  104* 159*   < >  --   --   --   --   --    BUN  --  9.6  --  11.0 10.3   < >  --   --   --   --   --    CR 0.63 0.60 0.60 0.52 0.52   < > 0.59  --  0.62  --  0.60   GFRESTIMATED 89 90 90 >90 >90   < > 87  --  86  --  87   GFRESTBLACK  --   --   --   --   --   --  >90  --  >90  --  >90   BRANDEE  --  9.8 9.8 9.4 8.6*   < >  --    < >  --    < >  --    BILITOTAL 0.4 0.3 0.2  --   --    < > 0.3  --  0.4  --  0.4   ALBUMIN 4.6 4.6 4.5  --   --    < > 3.6  --  4.2  --  4.1   PROTTOTAL 7.8 8.1 7.7  --   --    < > 7.0  --  8.0  --  7.6   ALKPHOS 37 50 53  --   --    < > 45  --  44  --  47   AST 30 29 29  --   --    < > 22  --  21  --  25   ALT 21 21 22  --   --    < > 36  --  34  --  36    < > = values in this interval not displayed.     Calcium/VitaminD  Recent Labs   Lab Test 06/20/23  5875  06/05/23  0941 04/25/23  1008 02/22/23  1018 02/11/23  0819 01/16/23  1122   BRANDEE 9.8  --  9.8 9.4   < > 9.2   VITDT  --  60 66  --   --  95*    < > = values in this interval not displayed.     ESR/CRP  Recent Labs   Lab Test 08/22/23  1005 04/25/23  1008 01/16/23  1122 07/12/22  1112 04/29/22  1000 02/04/22  0832 12/08/21  1053   SED 16 30 27   < > 15 14 16   CRP  --   --   --   --  <2.9 <2.9 <2.9   CRPI <3.00 <3.00 <3.00   < >  --   --   --     < > = values in this interval not displayed.     Lipid Panel  Recent Labs   Lab Test 01/16/23  1122 12/08/21  1053 11/27/20  1043   CHOL 185 184 188   TRIG 124 101 142   HDL 74 77 89   LDL 86 87 71   NHDL 111 107 99     Hepatitis B  Recent Labs   Lab Test 10/20/22  1256 09/15/15  1159   AUSAB  --  0.11   HBCAB Nonreactive Nonreactive   HEPBANG Nonreactive Nonreactive     Hepatitis C  Recent Labs   Lab Test 10/20/22  1256 09/15/15  1159   HCVAB Nonreactive Nonreactive   Assay performance characteristics have not been established for newborns,   infants, and children         Tuberculosis Screening  Recent Labs   Lab Test 02/16/23  0000 10/20/22  1256 08/18/21  1054 05/07/18  1033 09/15/15  1200   TBRES Indeterminate* Negative Negative  --   --    TBRSLT  --   --   --  Negative Negative   TBAGN  --   --   --  0.00 0.00       Immunization History     Immunization History   Administered Date(s) Administered    COVID-19 MONOVALENT 12+ (Pfizer) 02/09/2021, 03/03/2021    Flu, Unspecified 10/20/2013    Influenza (High Dose) 3 valent vaccine 10/28/2013, 09/23/2014, 11/11/2015, 09/23/2016, 09/24/2019    Influenza (IIV3) PF 10/12/1999, 10/26/2001, 10/19/2002, 10/30/2003, 10/28/2004, 10/21/2005, 10/26/2007, 10/21/2009, 10/05/2011, 10/13/2012    Influenza Vaccine 65+ (Fluzone HD) 10/07/2021, 11/16/2022    Influenza Vaccine >6 months,quad, PF 09/24/2020    Influenza Vaccine Im 4yrs+ 4 Valent CCIIV4 09/28/2017, 09/27/2018    Influenza Vaccine, 6+MO IM (QUADRIVALENT W/PRESERVATIVES)  11/16/2022    Influenza, seasonal, injectable, PF 10/24/2006, 11/14/2007, 10/22/2008, 10/21/2009    Mantoux Tuberculin Skin Test 11/03/2006    Pneumo Conj 13-V (2010&after) 07/29/2015    Pneumococcal 23 valent 06/26/2000, 10/26/2001, 06/01/2007, 06/02/2010    TD,PF 7+ (Tenivac) 12/10/2008, 07/20/2009    Tdap (Adult) Unspecified Formulation 12/12/2018    Zoster recombinant adjuvanted (SHINGRIX) 12/12/2018, 02/14/2019          Chart documentation done in part with Dragon Voice recognition Software. Although reviewed after completion, some word and grammatical error may remain.        Video-Visit Details    Type of service:  Video Visit    Video Start Time: 10:33 AM    Video End Time:10:45 AM    Originating Location (pt. Location): Home, [place]    Distant Location (provider location):  off site, [place]    Platform used for Video Visit: Roxanne Byers MD

## 2023-12-05 ENCOUNTER — LAB (OUTPATIENT)
Dept: LAB | Facility: CLINIC | Age: 83
End: 2023-12-05
Payer: COMMERCIAL

## 2023-12-05 ENCOUNTER — TELEPHONE (OUTPATIENT)
Dept: RHEUMATOLOGY | Facility: CLINIC | Age: 83
End: 2023-12-05

## 2023-12-05 DIAGNOSIS — R73.03 PREDIABETES: Primary | ICD-10-CM

## 2023-12-05 DIAGNOSIS — Z79.899 HIGH RISK MEDICATION USE: ICD-10-CM

## 2023-12-05 DIAGNOSIS — M05.79 RHEUMATOID ARTHRITIS INVOLVING MULTIPLE SITES WITH POSITIVE RHEUMATOID FACTOR (H): ICD-10-CM

## 2023-12-05 LAB
ALBUMIN SERPL BCG-MCNC: 4.4 G/DL (ref 3.5–5.2)
ALP SERPL-CCNC: 47 U/L (ref 40–150)
ALT SERPL W P-5'-P-CCNC: 29 U/L (ref 0–50)
AST SERPL W P-5'-P-CCNC: 37 U/L (ref 0–45)
BILIRUB DIRECT SERPL-MCNC: <0.2 MG/DL (ref 0–0.3)
BILIRUB SERPL-MCNC: 0.3 MG/DL
CREAT SERPL-MCNC: 0.55 MG/DL (ref 0.51–0.95)
EGFRCR SERPLBLD CKD-EPI 2021: >90 ML/MIN/1.73M2
HBA1C MFR BLD: 5.4 % (ref 0–5.6)
PROT SERPL-MCNC: 7.5 G/DL (ref 6.4–8.3)

## 2023-12-05 PROCEDURE — 82565 ASSAY OF CREATININE: CPT

## 2023-12-05 PROCEDURE — 36415 COLL VENOUS BLD VENIPUNCTURE: CPT

## 2023-12-05 PROCEDURE — 85025 COMPLETE CBC W/AUTO DIFF WBC: CPT

## 2023-12-05 PROCEDURE — 83036 HEMOGLOBIN GLYCOSYLATED A1C: CPT

## 2023-12-05 PROCEDURE — 80076 HEPATIC FUNCTION PANEL: CPT

## 2023-12-05 NOTE — TELEPHONE ENCOUNTER
PA Initiation    Medication: RINVOQ 15 MG PO TB24  Insurance Company: [employer] Part D - Phone [phone] Fax [phone]  Pharmacy Filling the Rx: [facility] - [place], [place] - [facility] [address]  Filling Pharmacy Phone:    Filling Pharmacy Fax:    Start Date: 12/5/2023    [id number]

## 2023-12-05 NOTE — TELEPHONE ENCOUNTER
Prior Authorization Approval    Medication: RINVOQ 15 MG PO TB24  Authorization Effective Date: 12/5/2023  Authorization Expiration Date: 12/31/2024  Approved Dose/Quantity: 30 tabs per 30 days  Reference #: [id number]   Insurance Company: [employer] - Phone [phone] Fax [phone]  Expected CoPay: $  0  CoPay Card Available:      Financial Assistance Needed: N/A  Which [facility] the prescription[facility] - [place], [place] - [address]  Pharmacy Notified: Not needed  Patient Notified: Not needed

## 2023-12-06 LAB
ACANTHOCYTES BLD QL SMEAR: NORMAL
AUER BODIES BLD QL SMEAR: NORMAL
BASO STIPL BLD QL SMEAR: NORMAL
BASOPHILS # BLD AUTO: 0 10E3/UL (ref 0–0.2)
BASOPHILS NFR BLD AUTO: 0 %
BITE CELLS BLD QL SMEAR: NORMAL
BLISTER CELLS BLD QL SMEAR: NORMAL
BURR CELLS BLD QL SMEAR: NORMAL
DACRYOCYTES BLD QL SMEAR: NORMAL
ELLIPTOCYTES BLD QL SMEAR: NORMAL
EOSINOPHIL # BLD AUTO: 0.1 10E3/UL (ref 0–0.7)
EOSINOPHIL NFR BLD AUTO: 2 %
ERYTHROCYTE [DISTWIDTH] IN BLOOD BY AUTOMATED COUNT: 14.7 % (ref 10–15)
FRAGMENTS BLD QL SMEAR: NORMAL
HCT VFR BLD AUTO: 36 % (ref 35–47)
HGB BLD-MCNC: 11.9 G/DL (ref 11.7–15.7)
HGB C CRYSTALS: NORMAL
HOWELL-JOLLY BOD BLD QL SMEAR: NORMAL
IMM GRANULOCYTES # BLD: 0.1 10E3/UL
IMM GRANULOCYTES NFR BLD: 1 %
LYMPHOCYTES # BLD AUTO: 1.2 10E3/UL (ref 0.8–5.3)
LYMPHOCYTES NFR BLD AUTO: 28 %
MCH RBC QN AUTO: 33.7 PG (ref 26.5–33)
MCHC RBC AUTO-ENTMCNC: 33.1 G/DL (ref 31.5–36.5)
MCV RBC AUTO: 102 FL (ref 78–100)
MONOCYTES # BLD AUTO: 0.7 10E3/UL (ref 0–1.3)
MONOCYTES NFR BLD AUTO: 17 %
NEUTROPHILS # BLD AUTO: 2.2 10E3/UL (ref 1.6–8.3)
NEUTROPHILS NFR BLD AUTO: 52 %
NEUTS HYPERSEG BLD QL SMEAR: NORMAL
NRBC # BLD AUTO: 0 10E3/UL
NRBC BLD AUTO-RTO: 0 /100
PLAT MORPH BLD: NORMAL
PLATELET # BLD AUTO: 379 10E3/UL (ref 150–450)
POLYCHROMASIA BLD QL SMEAR: NORMAL
RBC # BLD AUTO: 3.53 10E6/UL (ref 3.8–5.2)
RBC AGGLUT BLD QL: NORMAL
RBC MORPH BLD: NORMAL
ROULEAUX BLD QL SMEAR: NORMAL
SICKLE CELLS BLD QL SMEAR: NORMAL
SMUDGE CELLS BLD QL SMEAR: NORMAL
SPHEROCYTES BLD QL SMEAR: NORMAL
STOMATOCYTES BLD QL SMEAR: NORMAL
TARGETS BLD QL SMEAR: NORMAL
TOXIC GRANULES BLD QL SMEAR: NORMAL
VARIANT LYMPHS BLD QL SMEAR: NORMAL
WBC # BLD AUTO: 4.3 10E3/UL (ref 4–11)

## 2023-12-11 ENCOUNTER — IMMUNIZATION (OUTPATIENT)
Dept: FAMILY MEDICINE | Facility: CLINIC | Age: 83
End: 2023-12-11
Payer: COMMERCIAL

## 2023-12-11 DIAGNOSIS — Z23 NEED FOR PROPHYLACTIC VACCINATION AND INOCULATION AGAINST INFLUENZA: Primary | ICD-10-CM

## 2023-12-11 PROCEDURE — G0008 ADMIN INFLUENZA VIRUS VAC: HCPCS

## 2023-12-11 PROCEDURE — 90662 IIV NO PRSV INCREASED AG IM: CPT

## 2023-12-12 ENCOUNTER — PATIENT OUTREACH (OUTPATIENT)
Dept: CARE COORDINATION | Facility: CLINIC | Age: 83
End: 2023-12-12
Payer: COMMERCIAL

## 2023-12-12 NOTE — LETTER
[facility] COORDIN[facility]  [address]  [facility] [address]    December 12, 2023    Ginger MONTEMAYOR Sweats  [address]   [facility] [address]      Dear Ginger,    I have been attempting to reach you since our last contact. I would like to continue to work with you and provide any additional support you may need on achieving your health care related goals. I would appreciate if you would give me a call at [phone] to let me know if you would like to continue working together. I know that there are many things that can affect our ability to communicate and I hope we can continue to work together.    All of us at the [facility] are invested in your health and are here to assist you in meeting your goals.       Sincerely,    Melissa Mckeon  Community Health Worker  [facility]  [phone]

## 2023-12-12 NOTE — PROGRESS NOTES
Clinic Care Coordination Contact  [facility]/Voicemail    Clinical Data: Care Coordinator Outreach    Outreach Documentation Number of Outreach Attempt   12/12/2023  10:45 AM 1       Left message on patient's voicemail with call back information and requested return call.    Plan: Care Coordinator will send unable to contact letter with care coordinator contact information via [employer]. Care Coordinator will try to reach patient again in 10 business days.    Melissa Mckeon  Community Health Worker  [facility]  [phone]

## 2023-12-12 NOTE — PROGRESS NOTES
"Clinic Care Coordination Contact  Community Health Worker Follow Up    Care Gaps: Flu shot given 12/11/23. Pt reports she will not be getting the RSV or COVID-19 vaccines. \"I got the first 2 COVID shots because my daughter insisted.\"    Health Maintenance Due   Topic Date Due    URINE DRUG SCREEN  Never done    RSV VACCINE (Pregnancy & 60+) (1 - 1-dose 60+ series) Never done    COVID-19 Vaccine (3 - Pfizer risk series) 03/31/2021    EYE EXAM  08/18/2021    DEPRESSION 12 MO INDEX REPEAT PHQ-9  12/12/2023       Care Plan:   Care Plan: Specialty Referral       Problem: Patient is in need of specialty care       Long-Range Goal: Establish consistent relationship with specialist(s) as needed       Start Date: 6/8/2023 Expected End Date: 5/31/2024    This Visit's Progress: 80% Recent Progress: 80%    Note:     Barriers: diagnosis of multiple, chronic, complex medical conditions, provider availability - wait time to complete appointments, etc.   Strengths: motivated, engaged in care coordination.   Patient expressed understanding of goal: yes  Action steps to achieve this goal:  1. I will follow up with my providers as scheduled/recommended.   - TCO: 06/05/23 - follow up scheduled as recommended 6 weeks, date unknown (completed)  - Bone stimulator pending  - Mental Health: 09/26/23 (meets 1-2x/mo with therapist)  - Eye exam: Scheduled end of summer - 10/3/23 per pt - (completed)  - MTM Pharmacy: 7/31/23 - follow up recommended 6 months; 01/2024 TBD  - Endocrinology: 08/02/23 - biopsy 08/10/23 - needs to schedule follow up TBD  - Rheumatology: 08/25/23 - follow up scheduled 12/01/23 (completed)  - Sleep: 01/24/2024  - Primary Care Provider 02/28/2024  - Dexa: 02/06/24  - Podiatry 05/11/23 - declines to follow up at this time.   - Physical [facility]: ([facility])(12/12/23 - one more PT session after Jewels).  2. I will discuss, review, schedule and complete recommended overdue health maintenance " "with my Primary Care Provider.   3. I will contact my care team with questions, concerns, support needs. I will use the clinic as a resource and I understand I can contact my clinic with 24/7 after hours services available. Care Coordinator will remain available as needed.      Goal updated 12/12/23                                Intervention and Education during outreach:   Singles - got shingles over Thanksgiving which kept her from festivities, however, she feels it was a \"light case\". She doesn't have pain or paresthesia at this time.  Pt will be cooking both the ham and turkey for [place] gathering at her grandson's home. 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 Pt has one more PT session after Christmas. Pt states she \"has no problems with my hip\".  [employer] - continues to shop at [employer] and really likes. Pt has enrolled in [employer] program for 2024.  Energy Assistance - [employer] has put $200 towards pt's electric bill this winter.   Recertification at her apt building to make sure she qualifies for HUD - pt states she needs a list of the name of OTC drugs she uses for this recertification. Pt mentions distilled water for her CPAP machine. CHW recommends she contact the [facility] - [phone] - to discuss this situation. Pt took down the [facility] phone number and will call.    CHW asks if pt has any further concerns or need for resources as this time. Pt states she does not. CHW made sure pt has CHW contact information. Pt will contact CHW with questions or updates before next outreach.     CHW Plan: CHW will follow up with pt in one month.    Melissa Mckeon  Community Health Worker  [facility]  [phone]    "

## 2023-12-13 ENCOUNTER — PATIENT OUTREACH (OUTPATIENT)
Dept: CARE COORDINATION | Facility: CLINIC | Age: 83
End: 2023-12-13
Payer: COMMERCIAL

## 2023-12-13 ASSESSMENT — ACTIVITIES OF DAILY LIVING (ADL): DEPENDENT_IADLS:: CLEANING;LAUNDRY

## 2023-12-13 NOTE — PROGRESS NOTES
Clinic Care Coordination Contact  Care Coordination Clinician Chart review    Situation: Patient chart reviewed by care coordinator.       Background: Care Coordination initial assessment and enrollment took place 6/5/2023.   Upon initial assessment patient-centered goals were discussed and developed with participation from patient.  RNCC handed patient follow up and monitoring of goal progression off to CHW for continued outreach every 30 days.        Assessment: Per chart review, patient outreach completed by CC CHW on 12/12/2023.  Patient is actively working to accomplish goal(s) and patient's goal(s) remain(s) appropriate and relevant at this time.       Care Plan: Specialty Referral       Problem: Patient is in need of specialty care       Long-Range Goal: Establish consistent relationship with specialist(s) as needed       Start Date: 6/8/2023 Expected End Date: 5/31/2024    Recent Progress: 80%    Note:     Barriers: diagnosis of multiple, chronic, complex medical conditions, provider availability - wait time to complete appointments, etc.   Strengths: motivated, engaged in care coordination.   Patient expressed understanding of goal: yes  Action steps to achieve this goal:  1. I will follow up with my providers as scheduled/recommended.    Mental Health: 12/19/23 (meets 1-2x/mo with therapist)   Eye exam:10/3/23 per patient   MTM Pharmacy: 7/31/23 - follow up recommended 6 months; 01/2024 TBD   Endocrinology: 08/02/23 - biopsy 08/10/23 - needs to schedule follow up TBD   Rheumatology: 12/01/23 - follow up scheduled 02/06/24   Sleep: 01/24/2024   Primary Care Provider 02/28/2024   Dexa: 02/06/24   Podiatry 05/11/23 - declines to follow up at this time.    Physical Therapy: (in progress at [facility] - 11/1/23)(12/12/23 - one more PT session after Jewels).  2. I will discuss, review, schedule and complete recommended overdue health maintenance with my Primary Care Provider.   3. I will contact my care team  with questions, concerns, support needs. I will use the clinic as a resource and I understand I can contact my clinic with 24/7 after hours services available. Care Coordinator will remain available as needed.      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   Patient expressed understanding of goal: Yes  Action steps to achieve this goal:  1. I will review the following resources: (completed)  Hot Meals:   Easter [facility]                          [address], [place] [address]  Note: Please do NOT go to the building located at [address]. Loaves & Fishes meals are served at the corner of [address] and [address].     Meals Served: Monday - Thursday from 5:30 - 6:30 PM  Service Location: Meals served at the [facility] front doors (2 white double doors)  [employer] - [phone]            [address] [facility] [address]. [facility], [address]  2nd Thursday of each month - curbside available. 5:30-6:30 pm     [facility] - [phone]                        [address] [place] [address]  Wednesdays, 5:45-6:25 pm during school year only.      [facility] [employer] - [phone]        [address] [facility] [address]  Every 3rd and 4th Thursday, 5:00-6:00 pm.                    Curbside only due to COVID-19.  Those wishing a meal should enter the lower parking lot and follow directions. Meals are picked up by Door 1.     [facility]  [facility] - [phone]     [address], [place], [place]   Drive Through Meals: Monday 5:30-6:30 pm     [facility] - [phone]  [address], [place] [address]  Monday thru Thursday, 5:30-6:30 pm. Curbside only due to COVID-19.     Food Pantries:   [facility] [phone] [address] [place], [place], [address]   Partners with local charities and offers the [address] [place] [address], formerly [facility] Food Shelves      [facility] - [phone] [address] [place] [address] Hours: Tue 10:20 AM - 3:40 PM , Thu 10:20 AM - 3:40 PM  Fees: Free       The main service they offer is [employer] food shelf. They also run several federal government USDA supported programs in [facility]. This is run in partnership with [address] [place] [address] and other local social service agencies.      [facility] - [phone]  [address] [place] [address]    Hours: Tue 11:30 AM - 12:30 PM  Fees: Self Pay       [facility] - [phone] [address] [place] [address]   Hours: Mon - Wed 10:00 AM - 3:00 PM , Tue 5:30 PM - 7:30 PM , Thu 5:30 PM - 7:30 PM , Thu - Fri 10:00 AM - 12:00 PM  Fees: Free       [facility]  [phone]   Serves families in [facility], and [place]. Programs can provide food, meals, and other non-financial support.      [facility] [phone] i [address] [place], [place], [address]   This location operates a food shelf and other social service programs. Assistance offered is extensive, and includes personal hygiene supplies, household products, laundry and household . Or speak to a  and apply for other resources such as food stamps, TANF welfare, or more.      A second location of the [facility] [facility]  is located at [address] [place], [place], [address]. They offer many of the same programs and resources as indicated above.      [address] [facility] Food Shelf - [phone] [address] [place] [address] Hours: Tue 10:00 PM - 3:00 PM Appt. Only, Thu 12:00 PM - 6:00 PM Appt. Only  Fees: Free  (7/24/23 - going to check into this resource)     [facility]  - [phone] [address] [place] [address]  Hours: Sat 11:30 PM - 12:30 PM  Fees: Free       [facility] [phone]   [address] [place], [place], [address]   The non-profit offers Free Food for seniors and other income qualified elderly and residents. The main resource they offer from this facility is the Nutrition Assistance for Seniors (NAPS) supplemental food program. Government commodities, vouchers, and other food assistance is provided     [facility] [phone] [address] [place], [address]      [facility] [employer] - [phone] [address] [place] [address]  Hours: Tue 1:00 PM - 3:30 PM  Fees: Free    [address] [place] [address] - Coordinated Entry   [address] [place] [address]  Fees: Free [phone]   Hours: Mon - Thu 9:00 AM - 4:00 PM      [facility]   [address] [place] [address]  Fees: Free [phone]  Hours: Mon 4:00 PM - 6:00 PM Tue 11:00 AM - 1:00 PM , Wed 4:00 PM - 6:00 PM , Thu 10:30 AM - 12:30 PM, 2-3 PM      [facility] [address] [place] [address]  Fees: Free [phone]  Hours: Tue - Sat 10:00 AM - 6:00 PM      [facility] [employer] [facility] [employer] - [phone] Ext. [phone] [place], [place] [address]W[place]: Tue 10:00 AM - 12:00 PM  Fees: Free      Additional food pantries operate in [facility] [phone]   The centers may offer groceries, hot meals, and special seasonal programs such as summer  snacks for students or free Jewels and Thanksgiving meals      2. I will sign up for [employer] program and is utilizing $80 per month at [facility] location in her area (completed)                                Plan/Recommendations: RNCC Clinical Assessments to be completed annually or as needed. Annual Assessment will be due 06/2024. The patient will continue working with Care Coordination to achieve goal(s) as above.  CHW will involve RNCC as needed or if patient is ready to transition to goal status of Maintenance.  RNCC will continue to review progress to goal(s) and CHW outreaches every 6 weeks.     Complex Care Plan:  Patient is due for updated Plan of Care.  Care Plan updated and sent to patient: Yes, via Rosina Awad RN Care Coordinator  [facility] [place]  Email: [email or web address]  Phone: [phone]

## 2023-12-13 NOTE — LETTER
[facility] COORDINATION  [address]  [facility] [address]     December 13, 2023        Ginger Marshall  [address]  [facility] [address]          Dear Ginger,     Attached is an updated Patient Centered Plan of Care for your continued enrollment in Care Coordination. Please let us know if you have additional questions, concerns, or goals that we can assist with.    Sincerely,    Lucille Awad RN Care Coordinator  [facility] [place]  Email: [email or web address]  Phone: [phone]              [facility]  Patient Centered Plan of Care  About Me:      Patient Name:  Ginger Marshall    YOB: 1940  Age:         83 year old   Thayne MRN:    [id number] Telephone Information:  Home Phone [phone]   Mobile [phone]   Home Phone [phone]       Address:  [address]  [facility] [address] Email address:  [email or web address]      Emergency Contact(s)    Name Relationship Lgl Grd Work Phone Home Phone Mobile Phone   1HARRY WILLIS Daughter No  [phone]            Primary language:  English     needed? No   [facility] [employer]:  [phone] op. 1  Other communication barriers:Glasses    Preferred Method of Communication:  Rosina  Current living arrangement: I live alone    Mobility Status/ Medical Equipment: Independent w/Device    Health Maintenance  Health Maintenance Reviewed: Due/Overdue   Health Maintenance Due   Topic Date Due    URINE DRUG SCREEN  Never done    RSV VACCINE (Pregnancy & 60+) (1 - 1-dose 60+ series) Never done    COVID-19 Vaccine (3 - Pfizer risk series) 03/31/2021    EYE EXAM  08/18/2021    DEPRESSION 12 MO INDEX REPEAT PHQ-9  12/12/2023      My Access Plan  Medical Emergency 911   Primary Clinic Line [facility] [phone]   24 Hour Appointment Line [phone] or  [phone]) (toll-free)   24 Hour Nurse Line  [phone] (toll-free)   Preferred Urgent Care [facility], [phone]     Preferred Hospital [facility]  [phone]     Preferred Pharmacy [facility] - [place], [place] - [address]     Behavioral Health Crisis Line The National Suicide Prevention Lifeline at 1-616.417.5377 or Text/Call 988     My Care Team Members  Patient Care Team         Relationship Specialty Notifications Start End    Duy Leyva MD PCP - General Family Practice  11/16/22     Phone: [phone] Fax: [phone]         [address] [place] [address]    Nico Byers MD Assigned Rheumatology Provider   11/21/21     Phone: [phone] Fax: [phone] [address] [facility] [address]    Annmarie Smith MD Hospitalist Endocrinology, Diabetes, and Metabolism  2/10/22     Phone: [phone] Pager: [phone]         [address]  [facility] [address]    Annmarie Smith MD Assigned Endocrinology Provider   4/24/22     Phone: [phone] Pager: [phone] Fax: [phone]        [address] [place] [address] [address] [facility] [address]    Sonu Lemons DPM Referring Physician Podiatry  7/11/22     referred to derm    Phone: [phone] Fax: [phone]         [address] [facility] [address]    Diya Joseph PA-C Physician Assistant Dermatology  7/11/22     Phone: [phone] Fax: [phone] [address] [facility] [address]    Duy Leyva MD Assigned PCP   8/20/22     Phone: [phone] Fax: [phone]         [address] [place] [address]    Margaux Harley [facility] Pharmacist Pharmacist  12/19/22     Phone: [phone] Fax: [phone] [address] [facility] [address]    Sonu Lemons DPM Assigned Surgical Provider   2/4/23     Phone: [phone] Fax: [phone] [address] [facility] [address] [place] [address]    Cherri,  DARNELL Damico Assigned MTM Pharmacist   2/4/23     Phone: [phone] Fax: [phone] [address] [facility] [address]    Bernabe Munoz DPM Assigned Musculoskeletal Provider   5/6/23     Phone: [phone] Fax: [phone]         [address] [facility] [address] [facility] [address]    Lucille Awad, RN Lead Care Coordinator  Admissions 6/6/23     Phone: [phone]         [facility]   6/8/23     [place] [facility]    Phone: [phone]         Melissa Mckeon CHW Community Health Worker  Admissions 7/12/23     Phone: [phone]                     My Care Plans  Self Management and Treatment Plan    Care Plan  Care Plan: Specialty Referral       Problem: Patient is in need of specialty care       Long-Range Goal: Establish consistent relationship with specialist(s) as needed       Start Date: 6/8/2023 Expected End Date: 5/31/2024    Recent Progress: 80%    Note:     Barriers: diagnosis of multiple, chronic, complex medical conditions, provider availability - wait time to complete appointments, etc.   Strengths: motivated, engaged in care coordination.   Patient expressed understanding of goal: yes  Action steps to achieve this goal:  1. I will follow up with my providers as scheduled/recommended.    Mental Health: 12/19/23 (meets 1-2x/mo with therapist)   Eye exam:10/3/23 per patient   MT[facility] Pharmacy: 7/31/23 - follow up recommended 6 months; 01/2024 TBD   Endocrinology: 08/02/23 - biopsy 08/10/23 - needs to schedule follow up TBD   Rheumatology: 12/01/23 - follow up scheduled 02/06/24   Sleep: 01/24/2024   Primary Care Provider 02/28/2024   Dexa: 02/06/24   Podiatry 05/11/23 - declines to follow up at this time.    Physical Therapy: (in progress at [facility] - 11/1/23)(12/12/23 - one more PT session after [place]).  2. I will discuss, review, schedule and complete recommended overdue health maintenance with my Primary Care Provider.   3. I will contact my  care team with questions, concerns, support needs. I will use the clinic as a resource and I understand I can contact my clinic with 24/7 after hours services available. Care Coordinator will remain available as needed.      Goal updated 12/13/23                                Action Plans on File:            Depression          Advance Care Plans/Directives:   Advanced Care Plan/Directives on file:   Yes    Status of Document(s):   On File and Validated    Advanced Care Plan/Directives Type:   Advanced Directive - On File; POLST           My Medical and Care Information  Problem List   Patient Active Problem List   Diagnosis    Rheumatoid arthritis involving right hand with positive rheumatoid factor (H)    History of colonic polyps    Multinodular goiter    Pulmonary nodule    PSEUDOPHAKIA OU    PVD (POSTERIOR VITREOUS DETACHMENT) OU    PXF (PSEUDOEXFOLIATION OF LENS CAPSULE) OD    GERD (gastroesophageal reflux disease)    Hyperlipidemia LDL goal <100    Advance Care Planning    Neuropathy    SHERRY (obstructive sleep apnea)-severe (AHI 35)    zEncounter for counseling    Anemia of chronic disease    Osteoporosis    Cornea guttata, ou    Conjunctival concretions    Stenosis, spinal, lumbar    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    MGD (meibomian gland dysfunction)    Prediabetes    Chronic bilateral low back pain without sciatica    Allergic state, subsequent encounter    Anxiety    DDD (degenerative disc disease), lumbar    Chronic right shoulder pain    Moderate episode of recurrent major depressive disorder (H)    Rheumatic mitral stenosis    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    History of bisphosphonate therapy    Morbid obesity (H)    Immunocompromised (H24)    Thoracic spine pain    Other closed intra-articular fracture of distal end of left radius, initial encounter    Closed fracture of shaft of left ulna, unspecified fracture morphology, initial encounter     "Closed fracture of olecranon process of right ulna with routine healing      Current Medications and Allergies:    Allergies   Allergen Reactions    Abatacept Other (See Comments)     Severe headaches  Migraine    Celebrex [Roche-2 Inhibitors]      Ineffective    Celecoxib Unknown and Nausea    Levaquin [Levofloxacin] Fatigue     Severe body pain    Orencia [Abatacept]      Headache    Septra [Bactrim]     Sulfa Antibiotics Nausea and Vomiting     \"deathly ill\"  Allergic to everything with Sulfa in it.    Sulfamethoxazole-Trimethoprim Nausea and Nausea and Vomiting    Tramadol Other (See Comments)     Headache  Migraine headache    Valdecoxib Unknown and Nausea     Made me \"very very ill\", might of been \"cramping\"    Adhesive Tape Rash     Plastic tape  Plastic tapes    Antihistamines, Chlorpheniramine-Type  [Antihistamines, Chlorpheniramine-Type] Anxiety and Other (See Comments)      Current Outpatient Medications:     acetaminophen (TYLENOL) 325 MG tablet, Take 2 tablets (650 mg) by mouth every 6 hours as needed for mild pain Max tylenol 3000 mg in 24 hours, Disp: , Rfl:     albuterol (PROAIR HFA/PROVENTIL HFA/VENTOLIN HFA) 108 (90 Base) MCG/ACT inhaler, Inhale 2 puffs into the lungs every 6 hours as needed for shortness of breath, wheezing or cough, Disp: , Rfl:     atorvastatin (LIPITOR) 40 MG tablet, Take 1 tablet (40 mg) by mouth daily, Disp: 90 tablet, Rfl: 1    busPIRone (BUSPAR) 10 MG tablet, Take 1 tablet (10 mg) by mouth 2 times daily, Disp: 180 tablet, Rfl: 1    CALCIUM CITRATE PO, Take 300 mg by mouth daily Take with meal that contains least amount of calcium, Disp: , Rfl:     carboxymethylcellulose PF (REFRESH PLUS) 0.5 % ophthalmic solution, Place 1 drop into both eyes 2 times daily as needed for dry eyes, Disp: , Rfl:     Cholecalciferol (VITAMIN D-3 PO), Take 2,000 Units by mouth daily, Disp: , Rfl:     desvenlafaxine (PRISTIQ) 100 MG 24 hr tablet, Take 1 tablet (100 mg) by mouth daily, Disp: 90 " tablet, Rfl: 1    Ferrous Gluconate 324 (37.5 Fe) MG TABS, Take 1 tablet by mouth daily Started 6/2023, Disp: , Rfl:     guaiFENesin (MUCINEX) 600 MG 12 hr tablet, Take 1 tablet (600 mg) by mouth 2 times daily, Disp: 20 tablet, Rfl: 0    hydrocortisone 2.5 % cream, Apply topically 2 times daily For up to two weeks, Disp: 20 g, Rfl: 0    ipratropium (ATROVENT) 0.06 % nasal spray, 2 sprays in each nostril, 2-3 times per day as needed for rhinitis, Disp: 15 mL, Rfl: 1    leucovorin (WELLCOVORIN) 5 MG tablet, Take 1 tablet (5 mg) by mouth every 7 days . Take 24 hours after taking Methotrexate each week., Disp: 13 tablet, Rfl: 2    loratadine (CLARITIN) 10 MG tablet, Take 1 tablet (10 mg) by mouth 2 times daily, Disp: , Rfl:     meclizine (ANTIVERT) 25 MG tablet, Take 1 tablet (25 mg) by mouth 3 times daily as needed for dizziness, Disp: 20 tablet, Rfl: 0    Methotrexate, PF, (RASUVO) 25 MG/0.5ML autoinjector, Inject 0.5 mLs (25 mg) Subcutaneous every 7 days . Hold for signs of infection, and seek medical attention. Take every Thursday., Disp: 2 mL, Rfl: 6    naproxen sodium (ANAPROX) 220 MG tablet, Take 2 tablets (440 mg) by mouth daily as needed for moderate pain (4-6) (less than monthly use currently) (Patient taking differently: Take 440 mg by mouth daily as needed for moderate pain (using a couple times a month)), Disp: , Rfl:     olopatadine (PATADAY) 0.2 % ophthalmic solution, Place 1 drop into both eyes daily as needed Uses mostly in Spring, Disp: , Rfl:     pantoprazole (PROTONIX) 40 MG EC tablet, Take 1 tablet (40 mg) by mouth every morning (before breakfast), Disp: 90 tablet, Rfl: 3    sucralfate (CARAFATE) 1 GM tablet, Take 1 tablet (1 g) by mouth 4 times daily as needed for nausea (reflux) TAKE ONE TABLET BY MOUTH FOUR TIMES A DAY AS NEEDED HEARTBURN, Disp: , Rfl:     upadacitinib ER (RINVOQ) 15 MG tablet, Take 1 tablet (15 mg) by mouth daily . Hold for any infection and seek medical attention., Disp: 30  tablet, Rfl: 6    vitamin C (ASCORBIC ACID) 1000 MG TABS, Take 1 tablet (1,000 mg) by mouth daily, Disp: , Rfl:     zinc gluconate 50 MG tablet, Take 1 tablet (50 mg) by mouth daily (Patient not taking: Reported on 7/31/2023), Disp: , Rfl:     zoledronic Acid (RECLAST) 5 MG/100ML SOLN infusion, Inject 5 mg into the vein once Every year (next dose March 2023), Disp: , Rfl:      Care Coordination Start Date: 6/5/2023   Frequency of Care Coordination: monthly, more frequently as needed     Form Last Updated: 12/13/2023

## 2023-12-18 ENCOUNTER — MYC MEDICAL ADVICE (OUTPATIENT)
Dept: FAMILY MEDICINE | Facility: CLINIC | Age: 83
End: 2023-12-18
Payer: COMMERCIAL

## 2023-12-18 ENCOUNTER — NURSE TRIAGE (OUTPATIENT)
Dept: FAMILY MEDICINE | Facility: CLINIC | Age: 83
End: 2023-12-18
Payer: COMMERCIAL

## 2023-12-18 NOTE — TELEPHONE ENCOUNTER
Huddled with POD Carie Valerie - advised either ADS or ER. Patient needs to arrange transportation (cannot drive). If unable to obtain a ride would need EMS transport.    Contacted patient and reviewed provider's recommendations. Patient states that she spoke with a friend who will give her a ride to the ER.    Rocío ESCOTO RN, BSN, PHN

## 2023-12-18 NOTE — TELEPHONE ENCOUNTER
"Nurse Triage SBAR    Is this a 2nd Level Triage? YES, LICENSED PRACTITIONER REVIEW IS REQUIRED    Situation:   Patient calls reporting severe dizziness/vertigo.    Background:   Patient states that symptoms started about one week ago and were mild at first, but now worsening. Patient had similar symptoms in June 2023 where she went to the ER and was diagnosed with vertigo. Patient had not had further issues with vertigo until recently.    Assessment:   Patient reports severe vertigo (is unable to walk due to severity of symptoms). Last week only had mild symptoms in the morning, on 12/15 and 12/16 symptoms significantly worsened and patient had slight improvement yesterday and this AM. Patient went to the dentist this AM and after sitting up in chair noted severe vertigo \"I don't know how I got home\". Patient required assistance from  getting back to her apartment and now states that she cannot stand without losing balance. Patient reports that she had taken the remainder of her meclizine over the past few days which helped some, but did not resolve her symptoms. Vertigo is causing her to feel lightheaded, weak, and nauseated. No vomiting since Friday. Patient is able to keep fluids down. Patient advised that due to severity of symptoms recommend ER evaluation. Patient declines stating that she does not have transportation and refuses EMS transport.     Protocol Recommended Disposition:   Go to ED/UCC now (or to office with PCP approval).    Recommendation:   PCP is out of the office. Routing to POD for 2nd level triage.    Routed to provider: Carie Padilla    Does the patient meet one of the following criteria for ADS visit consideration? 16+ years old, with an [facility] PCP     TIP  Providers, please consider if this condition is appropriate for management at one of our Acute and Diagnostic Services sites.     If patient is a good candidate, please use dotphrase <dot>triageresponse and select Refer to " "ADS to document.    Reason for Disposition   SEVERE dizziness (e.g., unable to stand, requires support to walk, feels like passing out now)    Additional Information   Negative: SEVERE difficulty breathing (e.g., struggling for each breath, speaks in single words)   Negative: Shock suspected (e.g., cold/pale/clammy skin, too weak to stand, low BP, rapid pulse)   Negative: Difficult to awaken or acting confused (e.g., disoriented, slurred speech)   Negative: Fainted, and still feels dizzy afterwards   Negative: Overdose (accidental or intentional) of medications   Negative: New neurologic deficit that is present now: * Weakness of the face, arm, or leg on one side of the body * Numbness of the face, arm, or leg on one side of the body * Loss of speech or garbled speech   Negative: Heart beating < 50 beats per minute OR > 140 beats per minute   Negative: Sounds like a life-threatening emergency to the triager   Negative: Chest pain   Negative: Rectal bleeding, bloody stool, or tarry-black stool   Negative: Vomiting is main symptom   Negative: Diarrhea is main symptom   Negative: Headache is main symptom   Negative: Heat exhaustion suspected (i.e., dehydration from heat exposure)   Negative: Patient states that they are having an anxiety or panic attack   Negative: Dizziness from low blood sugar (i.e., < 60 mg/dl or 3.5 mmol/l)    Answer Assessment - Initial Assessment Questions  1. DESCRIPTION: \"Describe your dizziness.\"      Room \"goes around and around\", has to sit with her eyes closed  2. LIGHTHEADED: \"Do you feel lightheaded?\" (e.g., somewhat faint, woozy, weak upon standing)      Yes \"very weak\", needed assistance from  at [place] to get into her apartment  3. VERTIGO: \"Do you feel like either you or the room is spinning or tilting?\" (i.e. vertigo)      Yes  4. SEVERITY: \"How bad is it?\"  \"Do you feel like you are going to faint?\" \"Can you stand and walk?\"    - MILD: Feels slightly dizzy, " "but walking normally.    - MODERATE: Feels unsteady when walking, but not falling; interferes with normal activities (e.g., school, work).    - SEVERE: Unable to walk without falling, or requires assistance to walk without falling; feels like passing out now.       Severe  5. ONSET:  \"When did the dizziness begin?\"      12/11/23, waking up in the AM with dizziness. On 12/15/23 and 12/16/23 significantly worsened. Yesterday \"not so bad\", this AM patient also was feeling better until after leaving dentist.  6. AGGRAVATING FACTORS: \"Does anything make it worse?\" (e.g., standing, change in head position)      Movement  7. HEART RATE: \"Can you tell me your heart rate?\" \"How many beats in 15 seconds?\"  (Note: not all patients can do this)        Patient is unable to do this, does not feel fast or irregular  8. CAUSE: \"What do you think is causing the dizziness?\"      Vertigo  9. RECURRENT SYMPTOM: \"Have you had dizziness before?\" If Yes, ask: \"When was the last time?\" \"What happened that time?\"      Feels similar to when she had vertigo in [place]  10. OTHER SYMPTOMS: \"Do you have any other symptoms?\" (e.g., fever, chest pain, vomiting, diarrhea, bleeding)        Vomited twice on Friday, has not recurred since.  11. PREGNANCY: \"Is there any chance you are pregnant?\" \"When was your last menstrual period?\"        No    Protocols used: Dizziness-A-OH    Rocío ESCOTO RN, BSN, PHN    "

## 2023-12-19 ENCOUNTER — VIRTUAL VISIT (OUTPATIENT)
Dept: PSYCHOLOGY | Facility: CLINIC | Age: 83
End: 2023-12-19
Payer: COMMERCIAL

## 2023-12-19 ENCOUNTER — TELEPHONE (OUTPATIENT)
Dept: FAMILY MEDICINE | Facility: CLINIC | Age: 83
End: 2023-12-19
Payer: COMMERCIAL

## 2023-12-19 DIAGNOSIS — F33.1 MODERATE EPISODE OF RECURRENT MAJOR DEPRESSIVE DISORDER (H): Primary | ICD-10-CM

## 2023-12-19 PROCEDURE — 90837 PSYTX W PT 60 MINUTES: CPT | Mod: VID

## 2023-12-19 NOTE — TELEPHONE ENCOUNTER
Rec'd OTC Drugs and Personal Care Expense Verification from [facility]. Placed in POD basket for review and signature. Fax [phone].    Jacquelin Posey  Lead

## 2023-12-19 NOTE — TELEPHONE ENCOUNTER
Form needs to completed to include dose, timing, whether this is prn or shceduled, etc.    Aydin Mejias MD

## 2023-12-19 NOTE — PROGRESS NOTES
[facility]                                     Progress Note    Patient Name: Ginger Marshall  Date: 12/19/23         Service Type: Individual      Session Start Time: 9:00 am  Session End Time: 9:53 am     Session Length: 53 min    Session #: 12      Answers submitted by the patient for this visit:  Patient Health Questionnaire (Submitted on 9/26/2023)  If you checked off any problems, how difficult have these problems made it for you to do your work, take care of things at home, or get along with other people?: Somewhat difficult  PHQ9 TOTAL SCORE: 4      Attendees: Client attended alone    Service Modality:  Video Visit:      Provider verified identity through the following two step process.  Patient provided:  Patient is known previously to provider    Telemedicine Visit: The patient's condition can be safely assessed and treated via synchronous audio and visual telemedicine encounter.      Reason for Telemedicine Visit: Patient convenience (e.g. access to timely appointments / distance to available provider)    Originating Site (Patient Location): Patient's home    Distant Site (Provider Location): Provider Remote Setting- Home Office    Consent:  The patient/guardian has verbally consented to: the potential risks and benefits of telemedicine (video visit) versus in person care; bill my insurance or make self-payment for services provided; and responsibility for payment of non-covered services.     Patient would like the video invitation sent by:  My Chart    Mode of Communication:  Video Conference via Amwell    Distant Location (Provider):  Off-site    As the provider I attest to compliance with applicable laws and regulations related to telemedicine.    DATA  Interactive Complexity: No     Crisis: No    Extended Session (53+ minutes): PROLONGED SERVICE IN THE OUTPATIENT SETTING REQUIRING DIRECT (FACE-TO-FACE) PATIENT CONTACT BEYOND THE USUAL SERVICE:    - Longer session due to limited  access to mental health appointments and necessity to address patient's distress / complexity  Interactive Complexity: No  Crisis: No      Progress Since Last Session (Related to Symptoms / Goals / Homework):   Symptoms: No change   , low energy    Homework: Partially completed      Episode of Care Goals: Minimal progress - ACTION (Actively working towards change); Intervened by reinforcing change plan / affirming steps taken     Current / Ongoing Stressors and Concerns:  Distress regarding pregnant unmarried granddaughter, lack of contact with sister, limited to no contact with son's adult children Low motivation regarding household chores, seeking food resources. Low quality sleep, even with CPAP-waking up tired, nap in afternoon. Focus on socializing with various Caodaism groups.   Ongoing: Grieving loss of adult son who was blind and lived alone, fell and passed away, second loss of an adult/child. Noticing regrets about parenting.    Treatment Objective(s) Addressed in This Session:    Practice boundaries and radical acceptance of reality regarding sister, sons, reality of accident  Improve quantity and quality of night time sleep / decrease daytime naps      Intervention:  Supportive therapy: Provided active listening and validation. Validated focus on spirituality/Caodaism connections/meaning. Processed recent contact from sister, past hurts  Motivational Interviewing  Target Behavior:  radical acceptance of reality, gratitude , continue seeking help-for food access, affordable veterinary.     Stage of Change: ACTION (Actively working towards change)    MI Intervention: Expressed Empathy/Understanding, Supported Autonomy, Collaboration, Evocation and Open-ended questions     Change Talk Expressed by the Patient: Desire to change Reasons to change Activation    Provider Response to Change Talk: E - Evoked more info from patient about behavior change and A - Affirmed patient's thoughts, decisions, or attempts at  behavior change    Assessments completed prior to visit:  DA  12/5/22  The following assessments were completed by patient for this visit:  PHQ2:       2/27/2023     8:50 AM 2/24/2023     6:48 AM 2/20/2023     5:39 PM 1/15/2023    11:54 AM 11/16/2022     1:33 PM 8/5/2022     8:11 AM 7/6/2022     4:18 PM   PHQ-2 ( 1999 [employer])   Q1: Little interest or pleasure in doing things 0 1 1 1 1 3    Q2: Feeling down, depressed or hopeless 0 1 1 1 1 0    PHQ-2 Score 0 2 2 2 2 3    Q1: Little interest or pleasure in doing things  Several days Several days Several days Several days Nearly every day    Q2: Feeling down, depressed or hopeless  Several days Several days Several days Several days Not at all    PHQ-2 Score  2 2 2 2 3 Incomplete     PHQ9:       2/23/2023     8:40 AM 3/14/2023    12:35 PM 6/5/2023     2:31 PM 8/23/2023     8:30 AM 9/26/2023     9:50 AM 11/19/2023    10:24 AM 11/27/2023    12:37 PM   PHQ-9 SCORE   PHQ-9 Total Score MyChart 4 (Minimal depression) 2 (Minimal depression)  5 (Mild depression) 4 (Minimal depression) 6 (Mild depression) 6 (Mild depression)   PHQ-9 Total Score 4 2    2 10 5 4 6 6     Patient Health Questionnaire (Submitted on 9/26/2023)  If you checked off any problems, how difficult have these problems made it for you to do your work, take care of things at home, or get along with other people?: Somewhat difficult  PHQ9 TOTAL SCORE: 4  GAD2:       1/3/2023    11:27 AM 2/2/2023     7:27 AM 2/23/2023     8:41 AM 3/12/2023    10:36 AM 8/23/2023     8:31 AM 9/23/2023    10:05 AM 11/19/2023    10:24 AM   SPIRNG-2   Feeling nervous, anxious, or on edge  1 1 1    1 0 0 0   Not being able to stop or control worrying 1 1 0 0    0 0 0 0   SPRING-2 Total Score  2 1 1    1 0 0 0     GAD7:       2/22/2018     1:46 PM 6/5/2019     4:10 PM 6/16/2020     9:54 AM 7/6/2022     4:18 PM 8/5/2022     8:11 AM 11/16/2022     1:33 PM 6/5/2023     2:31 PM   SPRING-7 SCORE   Total Score    2 (minimal anxiety) 5 (mild anxiety)  2 (minimal anxiety)    Total Score 0 0 7 2 5 2 3     CAGE-AID:       12/5/2022     9:28 AM   CAGE-AID Total Score   Total Score 0   Total Score MyChart 0 (A total score of 2 or greater is considered clinically significant)     PROMIS 10-Global Health (only subscores and total score):       9/21/2017    11:00 AM 11/15/2018     1:00 PM 3/25/2022     7:34 AM 12/5/2022     9:28 AM 3/12/2023    10:38 AM 8/23/2023     8:34 AM 12/14/2023     6:28 AM   PROMIS-10 Scores Only   Global Mental Health Score 13 12 12 8    8 8    8 12 13   Global Physical Health Score 14 12 12 13    13 11    11 12 12   PROMIS TOTAL - SUBSCORES 27 24 24 21    21 19    19 24 25     [place] Suicide Severity Rating Scale (Lifetime/Recent)      12/5/2022    11:00 AM   [place] Suicide Severity Rating (Lifetime/Recent)   Q1 Wished to be Dead (Past Month) no   Q2 Suicidal Thoughts (Past Month) no   Q3 Suicidal Thought Method no   Q4 Suicidal Intent without Specific Plan no   Q5 Suicide Intent with Specific Plan no   Q6 Suicide Behavior (Lifetime) no   Level of Risk per Screen low risk         ASSESSMENT: Current Emotional / Mental Status (status of significant symptoms):   Risk status (Self / Other harm or suicidal ideation)   Patient denies current fears or concerns for personal safety.   Patient denies current or recent suicidal ideation or behaviors.   Patient denies current or recent homicidal ideation or behaviors.   Patient denies current or recent self injurious behavior or ideation.   Patient denies other safety concerns.   Patient reports there has been no change in risk factors since their last session.     Patient reports there has been no change in protective factors since their last session.     Recommended that patient call 911 or go to the local ED should there be a change in any of these risk factors.     Appearance:   Appropriate    Eye Contact:   Good    Psychomotor Behavior: Normal    Attitude:   Pleasant  Attentive   Orientation:   All   Speech    Rate / Production: Emotional Normal     Volume:  Normal    Mood:    Depressed    Affect:    Appropriate    Thought Content:  Clear    Thought Form:  Coherent  Logical  Circumstantial   Insight:    Good      Medication Review:   No changes to current psychiatric medication(s)     Medication Compliance:   Yes     Changes in Health Issues:   None reported     Chemical Use Review:   Substance Use: Chemical use reviewed, no active concerns identified      Tobacco Use: No change in amount of tobacco use since last session.  Patient declined discussion at this time    Diagnosis:  1. Moderate episode of recurrent major depressive disorder (H)        Collateral Reports Completed:   Not Applicable    PLAN: (Patient Tasks / Therapist Tasks / Other)   Embrace radical acceptance and boundaries. Continue using food resources.     Rocío Arenas, [facility] 12/19/2023                                                                                                Individual Treatment Plan    Patient's Name: Ginger Marshall  YOB: 1940    Date of Creation: 2/8/23  Date Treatment Plan Last Reviewed/Revised:   12/19/2023    DSM5 Diagnoses: 296.31 (F33.0) Major Depressive Disorder, Recurrent Episode, Mild With anxious distress  Psychosocial / Contextual Factors: aging, losses, generational trauma  PROMIS (reviewed every 90 days):   21  12/5/22    Referral / Collaboration:  Referral to another professional/service is not indicated at this time..    Anticipated number of session for this episode of care: 6-9 sessions  Anticipation frequency of session: Every other week  Anticipated Duration of each session: 38-52 minutes  Treatment plan will be reviewed in 90 days or when goals have been changed.     MeasurableTreatment Goal(s) related to diagnosis / functional impairment(s)  Goal 1: Patient will experience an improvement in mood and functioning as evidenced by assessment scores, self  report and clinician observation    I will know I've met my goal when things feel better (paraphrase).      Objective #A (Patient Action)    Patient will increase understanding of steps in the grief process   Talk to others about losses  Use prayer practices and jena community to support well being.   Practice boundaries and radical acceptance of reality regarding sister sons, reality of accident   Engage in social activities at [facility]  Status: 12/19/2023    Intervention(s)  Therapist will teach CBT, DBT  and support grief processing skills, prayer practices .    Objective #B  Patient will Increase interest, engagement, and pleasure in doing things  Decrease frequency and intensity of feeling down, depressed, hopeless  Improve quantity and quality of night time sleep / decrease daytime naps  Feel less tired and more energy during the day   Improve diet, appetite, mindful eating, and / or meal planning  Identify negative self-talk and behaviors: challenge core beliefs, myths, and actions  Improve concentration, focus, and mindfulness in daily activities   Feel less fidgety, restless or slow in daily activities / interpersonal interactions.  Status: 12/19/2023    Intervention(s)  Therapist will  teach cbt, dbt,MI, mindfulness and behavioral activation and assign homework .      Patient has reviewed and agreed to the above plan.      Rocío Arenas, [facility]  12/19/2023

## 2023-12-21 ENCOUNTER — OFFICE VISIT (OUTPATIENT)
Dept: FAMILY MEDICINE | Facility: CLINIC | Age: 83
End: 2023-12-21
Payer: COMMERCIAL

## 2023-12-21 VITALS
TEMPERATURE: 98 F | WEIGHT: 203.6 LBS | HEIGHT: 66 IN | BODY MASS INDEX: 32.72 KG/M2 | RESPIRATION RATE: 16 BRPM | SYSTOLIC BLOOD PRESSURE: 125 MMHG | HEART RATE: 82 BPM | DIASTOLIC BLOOD PRESSURE: 77 MMHG | OXYGEN SATURATION: 99 %

## 2023-12-21 DIAGNOSIS — H81.22 VESTIBULAR NEURITIS, LEFT: Primary | ICD-10-CM

## 2023-12-21 PROCEDURE — 99213 OFFICE O/P EST LOW 20 MIN: CPT | Performed by: PHYSICIAN ASSISTANT

## 2023-12-21 RX ORDER — PREDNISONE 20 MG/1
TABLET ORAL
Qty: 20 TABLET | Refills: 0 | Status: SHIPPED | OUTPATIENT
Start: 2023-12-21 | End: 2024-02-21

## 2023-12-21 RX ORDER — RESPIRATORY SYNCYTIAL VIRUS VACCINE 120MCG/0.5
0.5 KIT INTRAMUSCULAR ONCE
Qty: 1 EACH | Refills: 0 | Status: CANCELLED | OUTPATIENT
Start: 2023-12-21 | End: 2023-12-21

## 2023-12-21 ASSESSMENT — PAIN SCALES - GENERAL: PAINLEVEL: NO PAIN (0)

## 2023-12-21 NOTE — PROGRESS NOTES
Assessment & Plan     Vestibular neuritis, left  This coming on the heels so quickly following zoster, highly suggestive of neuritis. No red flags to exam. Recommend trial below and physical therapy. She did not find much benefit from meclazine so we'll hold off. She should return with any changes/worsening, etc  - Physical Therapy Referral; Future  - predniSONE (DELTASONE) 20 MG tablet; Take 3 tabs by mouth daily x 3 days, then 2 tabs daily x 3 days, then 1 tab daily x 3 days, then 1/2 tab daily x 3 days.      MARYSOL [facility] RAMANA Miles is a 83 year old, presenting for the following health issues:  Dizziness        12/21/2023    12:49 PM   Additional Questions   Roomed by Dahlia ACEVES CMA   Accompanied by ARETHA         12/21/2023    12:49 PM   Patient Reported Additional Medications   Patient reports taking the following new medications None       History of Present Illness       Reason for visit:  Dizziness that won't go away    She eats 2-3 servings of fruits and vegetables daily.She consumes 0 sweetened beverage(s) daily.She exercises with enough effort to increase her heart rate 9 or less minutes per day.  She exercises with enough effort to increase her heart rate 3 or less days per week.   She is taking medications regularly.         Dizziness  Onset/Duration: 2 weeks   Description:   Do you feel faint: No  Does it feel like the surroundings (bed, room) are moving: YES- sometimes   Unsteady/off balance: YES  Have you passed out or fallen: No  Intensity: moderate  Progression of Symptoms: worsening  Accompanying Signs & Symptoms:  Heart palpitations or chest pain: No  Nausea, vomiting: No  Weakness or lack of coordination in arms or legs: No  Vision or speech changes: No  Numbness or tingling: No  Ringing in ears (Tinnitus): YES  Hearing Loss: No  History:   Head trauma/concussion history: No  Previous similar symptoms: No  Recent bleeding history: No  Any new  "medications (BP?): No  Precipitating factors:   Worse with activity: YES- moving to fast, bending or looking down   Worse with head movement: YES  Alleviating factors:   Does staying in a fixed position give relief: YES- As  long as she is still   Therapies tried and outcome: Meclizine- ran out and gave her minimal relief.     Ginger Marshall is a 83 year old female who presents today for 2 week hx of sudden onset dizziness around 2 weeks ago  First noted it one morning  She had a similar occurrence in June that was self limited  If she is immobile she has no symptoms, but with any head movement or change of positions  The worse was recently Friday and Saturday and seems to have diminished a bit since then so that she can \"sort of function\"  Dizziness will come in waves; earlier in the course seems to have been only in the mornings  Less nauseated this week  Has vomited a few times, only last week  She denies any NEW vision changes  Some tinnitus;     Eating and drinking fine; however limiting because she does not want to stand around and cook  Denies any weakness of extremities; no difficulty swallowing  She has continued with wheeled walker    Did have recent episode of shingles; head and chin left sided            Review of Systems   Constitutional, HEENT, cardiovascular, pulmonary, gi and gu systems are negative, except as otherwise noted.      Objective    /77 (BP Location: Right arm, Patient Position: Sitting, Cuff Size: Adult Large)   Pulse 82   Temp 98  F (36.7  C) (Oral)   Resp 16   Ht 1.664 m (5' 5.5\")   Wt 92.4 kg (203 lb 9.6 oz)   SpO2 99%   BMI 33.37 kg/m    Body mass index is 33.37 kg/m .  Physical Exam   GENERAL: healthy, alert and no distress  EYES: Eyes grossly normal to inspection, PERRL and conjunctivae and sclerae normal  HENT: ear canals and TM's normal, nose and mouth without ulcers or lesions  NECK: no carotid bruits  RESP: lungs clear to auscultation - no rales, rhonchi or " wheezes  CV: regular rates and rhythm; there is no ectopy  SKIN: no suspicious lesions or rashes  NEURO: Normal strength and tone, mentation intact, cranial nerves 2-12 intact, and gait abnormal - she does move slowly with intent given her symptoms   PSYCH: mentation appears normal, affect normal/bright

## 2023-12-24 RX ORDER — CALCIUM CARBONATE 750 MG/1
750 TABLET, CHEWABLE ORAL DAILY
COMMUNITY
End: 2024-02-06

## 2023-12-24 RX ORDER — DEXTROMETHORPHAN POLISTIREX 30 MG/5ML
60 SUSPENSION ORAL 2 TIMES DAILY
COMMUNITY
End: 2024-02-29

## 2023-12-24 NOTE — TELEPHONE ENCOUNTER
Brief chart review.    Signed form.    Added non prescription medications to med list. Unsure which Beckie Laurel Cold formulation she takes. Would recommend stopping this medication as some of those ingredients can interfere with her methotrexate.     Please call patient to let her know.    Thanks,    Duy Leyva MD  [facility]  12/24/2023

## 2023-12-26 NOTE — TELEPHONE ENCOUNTER
Spoke with patient. Shared information below. Faxed.     Jacquelin PEREIRA  Lead   [facility]

## 2023-12-28 DIAGNOSIS — F33.1 MODERATE EPISODE OF RECURRENT MAJOR DEPRESSIVE DISORDER (H): ICD-10-CM

## 2023-12-28 DIAGNOSIS — F41.9 ANXIETY: ICD-10-CM

## 2023-12-28 RX ORDER — DESVENLAFAXINE 100 MG/1
100 TABLET, EXTENDED RELEASE ORAL DAILY
Qty: 90 TABLET | Refills: 0 | Status: SHIPPED | OUTPATIENT
Start: 2023-12-28 | End: 2024-02-29

## 2024-01-02 ENCOUNTER — THERAPY VISIT (OUTPATIENT)
Dept: PHYSICAL THERAPY | Facility: CLINIC | Age: 84
End: 2024-01-02
Attending: PHYSICIAN ASSISTANT
Payer: COMMERCIAL

## 2024-01-02 DIAGNOSIS — H81.22 VESTIBULAR NEURITIS, LEFT: ICD-10-CM

## 2024-01-02 PROCEDURE — 97112 NEUROMUSCULAR REEDUCATION: CPT | Mod: GP | Performed by: PHYSICAL THERAPIST

## 2024-01-02 PROCEDURE — 97161 PT EVAL LOW COMPLEX 20 MIN: CPT | Mod: GP | Performed by: PHYSICAL THERAPIST

## 2024-01-02 NOTE — PROGRESS NOTES
PHYSICAL THERAPY EVALUATION  Type of Visit: Evaluation    See electronic medical record for Abuse and Falls Screening details.    Subjective       Presenting condition or subjective complaint: Vertigo. Patient notes sudden onset dizziness that started about a month ago. The dizziness comes in waves, worse in the morning at the beginning. She was given a Prednisone course on 12/21/23 which she finished yesterday. She does note a recent bout of dizziness in June, also recently had an episode of shingles.  She notes her dizziness has gradually improved and now feels like it has resolved. She notes generally feeling unstable and afraid of falling, has not done any treatment for it in the past other than a few visits with home health. She notes a fall last February, lost her balance and broke both arms. She presents with no AD today but notes she usually uses a walker when outside her apartment.     Date of onset: 12/06/24 (About 2 weeks before start of prednisone taper)    Relevant medical history:   Rib fractures, lumbar fusion 4/2017, migraines - not currently, R WALTER 2010, L TKA 2006, pelvic fracture, COPD, and RA  Dates & types of surgery:      Prior diagnostic imaging/testing results:       Prior therapy history for the same diagnosis, illness or injury: No      Prior Level of Function  Transfers: Independent  Ambulation: Assistive equipment  ADL: Assistive equipment  IADL: Driving, Finances, Housekeeping, Laundry, Meal preparation, Medication management    Living Environment  Social support: Alone   Type of home: Apartment/condo   Stairs to enter the home: No       Ramp: No   Stairs inside the home: No       Help at home: None  Equipment owned: Straight Cane; Walker with wheels; Grab bars; Raised toilet seat     Employment: No    Hobbies/Interests:      Patient goals for therapy: Control the dizziness    Pain assessment: Pain present, broken toe - chronic, hurts with walking. She has tried different types of  orthotics and bandages with not much improvement.      Objective    BALANCE: Notes a general decline in balance recently, less confident    SPECIAL TESTS  Functional Gait Assessment (FGA)      10 Meter Walk Test (Comfortable)  20 foot distance, no AD  0.55 m/s   10 Meter Walk Test (Fast)     6 Minute Walk Test (6MWT)           Lockett Balance Scale (BBS)     5 Times Sit-to-Stand (5TSTS)       Dynamic Gait Index (DGI)  4 Item DGI; 6/12   Timed Up and Go (TUG) - sec    Single Leg Stance Right (sec)    Single Leg Stance Left (sec)    Modified CTSIB Conditions (sec) Cond 1:   Cond 2:   Cond 4:   Cond 5 :    Romberg  (sec)    Sharpened Romberg (sec)    30 Second Sit to Stand (reps/height)            SENSATION:  No changes per report       VESTIBULAR EVALUATION  ADDITIONAL HISTORY:  Description of symptoms: Off balance; Attacks of dizziness; Light-headedness  Dizzy attacks:   Start: June of 2023   Last attack: 3 weeks ago   Frequency of occurrences: Veries   Length of attack: Veries  Difficulty hearing:    Noise in ears? Yes Buzzing and ringing  Alleviates symptoms: ?  Worsens symptoms: ?  Activities that bring on symptoms: Bending over; Other  Turning    Pertinent visual history: No recent changes, eye exam last year  Pertinent history of current vestibular problem: Migraines, Prior concussion(s)  DHI: Total Score: 48    Cervicogenic Screen    Neck ROM WFL for positional testing     Oculomotor Screen    Ocular ROM Normal   Smooth Pursuit Normal   Saccades Normal   VOR Normal, no retinal slip observed but notes blurry vision   VOR Cancellation Normal   Head Impulse Test Normal, likely covert saccades   Convergence Testing         Infrared Goggle Exam Vestibular Suppressant in Last 24 Hours? No  Exam Completed With: Infrared goggles   Spontaneous Nystagmus Negative   Gaze Evoked Nystagmus Negative   Head Shake Horizontal Nystagmus Negative, unstable feeling   Positional Testing    Supine Head-Hanging Test     Left Right    Cris-Hallpike Downbeating, asymptomatic Downbeating, asymptomatic   Sidelying Test     HSCC Supine Roll Test Negative Downbeating, asymptomatic   HSCC Forward Roll Test     [place] and Lean Test -  Sitting Erect    [place] and Lean Test - Seated, Head Bent 60 Degrees Forward    [place] and Lean Test - Seated, Head Bent Backwards       Negative positional testing but nystagmus concerning for possible central involvement    Dynamic Visual Acuity (DVA)    Static Acuity (LogMar) 20/16   Horizontal Head Movement at 1 Hz (LogMar)    Horizontal Head Movement at 2 Hz (LogMar) 20/100   Abnormal degradation of 8 lines, reproduction of dizziness       Assessment & Plan   CLINICAL IMPRESSIONS  Medical Diagnosis: Vestibular neuritis, left (H81.22    Treatment Diagnosis: s/s subacute unilateral vestibular hypofunction   Impression/Assessment: Patient is a 83 year old female with dizziness and balance complaints.  The following significant findings have been identified: Impaired balance, Impaired gait, Decreased activity tolerance, Instability, Dizziness, and Disequilibrium . These impairments interfere with their ability to perform self care tasks, recreational activities, household chores, household mobility, and community mobility as compared to previous level of function.     Clinical Decision Making (Complexity):  Clinical Presentation: Stable/Uncomplicated  Clinical Presentation Rationale: based on medical and personal factors listed in PT evaluation  Clinical Decision Making (Complexity): Low complexity    PLAN OF CARE  Treatment Interventions:  Interventions: Gait Training, Neuromuscular Re-education, Therapeutic Activity, Therapeutic Exercise, Self-Care/Home Management, Canalith Repositioning, Standardized Testing    Long Term Goals     PT Goal 1  Goal Identifier: DHI  Goal Description: Patient will complete the DHI with a score of <20/100 to demonstrate decreased perception of handicap and improved quality of life.  Rationale: to  maximize safety and independence with performance of ADLs and functional tasks;to maximize safety and independence within the home;to maximize safety and independence with self cares;to maximize safety and independence within the community  Goal Progress: Eval: 48/100  Target Date: 02/27/24  PT Goal 2  Goal Identifier: DVA  Goal Description: Patient will complete DVA testing with a loss of 3 lines or less between static and 2 Hz head movement and no exacerbation of dizziness to demonstrate improved gaze stability for return to activities without limitation.  Rationale: to maximize safety and independence with performance of ADLs and functional tasks;to maximize safety and independence within the home;to maximize safety and independence with self cares  Goal Progress: Eval: Abnormal degradation of 8 lines, reproduction of dizziness  Target Date: 02/27/24  PT Goal 3  Goal Identifier: HEP  Goal Description: Patient will demonstrate understanding and compliance to her HEP at least 3x per week for continued wellbeing upon discharge from skilled physical therapy.  Rationale: to maximize safety and independence with performance of ADLs and functional tasks;to maximize safety and independence within the home;to maximize safety and independence within the community;to maximize safety and independence with self cares  Target Date: 02/27/24  PT Goal 4  Goal Identifier: FGA  Goal Description: Patient will complete the FGA with a score of 22/30 to demonstrate improved balance and decreased risk for falls.  Rationale: to maximize safety and independence with performance of ADLs and functional tasks;to maximize safety and independence within the home;to maximize safety and independence within the community  Goal Progress: Eval: 4 Item DGI; 6/12  Target Date: 02/27/24  PT Goal 5  Goal Identifier: Gait speed  Goal Description: Patient will ambulate at a normal speed of >1.0 m/s with the least restrictive AD or no AD to demonstrate  improved step length and gait speed for increased safety and independence with crossing the street.  Rationale: to maximize safety and independence within the home;to maximize safety and independence within the community;to maximize safety and independence with transportation  Goal Progress: Eval: 20 foot distance, no AD  0.55 m/s  Target Date: 02/27/24      Frequency of Treatment: 1x per week, decreasing in frequency as indicated  Duration of Treatment: 8 weeks    Recommended Referrals to Other Professionals:   Education Assessment:   Learner/Method: Patient;Demonstration;Pictures/Video  Education Comments: Exam findings, POC, HEP    Risks and benefits of evaluation/treatment have been explained.   Patient/Family/caregiver agrees with Plan of Care.     Evaluation Time:     PT Ryann, Low Complexity Minutes (97600): 30       Signing Clinician: Patsy Orellana, DEVON      [facility]                                                                                   OUTPATIENT PHYSICAL THERAPY      PLAN OF TREATMENT FOR OUTPATIENT REHABILITATION   Patient's Last Name, First Name, Ginger Vu YOB: 1940   Provider's Name   [facility]   Medical Record No.  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              Referring Provider:  Roe Hernandez    Initial Assessment  See Epic Evaluation- Start of Care Date: 01/02/24

## 2024-01-16 ENCOUNTER — MYC MEDICAL ADVICE (OUTPATIENT)
Dept: FAMILY MEDICINE | Facility: CLINIC | Age: 84
End: 2024-01-16
Payer: COMMERCIAL

## 2024-01-16 NOTE — TELEPHONE ENCOUNTER
Talked to Dr. Leyva about pt.  She advised the pt needs to be seen today or tomorrow.      Called the pt.  Appt was scheduled.      Appointments in Next Year      Jan 17, 2024  1:30 PM  (Arrive by 1:10 PM)  Provider Visit with Duy Leyva MD  [facility] ([facility] ) [phone]     Jan 18, 2024  3:00 PM  Vestibular Treatment with Dahlia Cavazos, PT  [facility] ([facility] ) [phone]     Jan 22, 2024  9:00 AM  (Arrive by 8:45 AM)  Adult Psychotherapy with HECTOR Springer  [facility] ([facility] ) [phone]     Jan 24, 2024 11:00 AM  (Arrive by 10:45 AM)  New Sleep Patient with Jessica Henson PA-C  [facility] ([facility] ) [phone]     Jan 25, 2024 12:45 PM  Vestibular Treatment with Marie Robbins, PT  [facility] ([facility] ) [phone]     Jan 29, 2024 10:30 AM  Lab visit with RM LAB  [facility] ([facility] ) [phone]     Feb 01, 2024 12:45 PM  Vestibular Treatment with Marie Robbins, PT  [facility] ([facility] ) [phone]     Feb 06, 2024  9:40 AM  (Arrive by 9:25 AM)  Return Visit with Nico Byers MD  [facility] ([facility] ) [phone]     Feb 06, 2024 11:00 AM  (Arrive by 10:45 AM)  DX HIP/PELVIS/SPINE with FKDX1  [facility] ([facility] ) [phone]     Feb 08, 2024 12:45 PM  Vestibular Treatment with Marie Robbins, PT  [facility]  [facility] ) [phone]     Feb 15, 2024 12:45 PM  Vestibular Treatment with DEVON Clar[facility] ([facility] ) [phone]     Feb 28, 2024 10:30 AM  (Arrive by 10:10 AM)  MEDICARE ANNUAL WELLNESS VISIT with Duy Leyva MD  [facility] ([facility] ) [phone]

## 2024-01-17 ENCOUNTER — OFFICE VISIT (OUTPATIENT)
Dept: FAMILY MEDICINE | Facility: CLINIC | Age: 84
End: 2024-01-17
Payer: COMMERCIAL

## 2024-01-17 VITALS
BODY MASS INDEX: 32.62 KG/M2 | RESPIRATION RATE: 16 BRPM | HEIGHT: 66 IN | SYSTOLIC BLOOD PRESSURE: 124 MMHG | TEMPERATURE: 97.7 F | HEART RATE: 89 BPM | WEIGHT: 203 LBS | OXYGEN SATURATION: 95 % | DIASTOLIC BLOOD PRESSURE: 72 MMHG

## 2024-01-17 DIAGNOSIS — R13.10 ODYNOPHAGIA: ICD-10-CM

## 2024-01-17 DIAGNOSIS — D84.9 IMMUNOCOMPROMISED (H): ICD-10-CM

## 2024-01-17 DIAGNOSIS — R51.9 SCALP TENDERNESS: ICD-10-CM

## 2024-01-17 DIAGNOSIS — A60.04 HERPES SIMPLEX VULVOVAGINITIS: Primary | ICD-10-CM

## 2024-01-17 DIAGNOSIS — F33.1 MODERATE EPISODE OF RECURRENT MAJOR DEPRESSIVE DISORDER (H): ICD-10-CM

## 2024-01-17 DIAGNOSIS — M05.741 RHEUMATOID ARTHRITIS INVOLVING RIGHT HAND WITH POSITIVE RHEUMATOID FACTOR (H): ICD-10-CM

## 2024-01-17 DIAGNOSIS — E66.01 MORBID OBESITY (H): ICD-10-CM

## 2024-01-17 PROCEDURE — 99214 OFFICE O/P EST MOD 30 MIN: CPT | Performed by: STUDENT IN AN ORGANIZED HEALTH CARE EDUCATION/TRAINING PROGRAM

## 2024-01-17 RX ORDER — IPRATROPIUM BROMIDE 42 UG/1
SPRAY, METERED NASAL
Qty: 15 ML | Refills: 1 | Status: CANCELLED | OUTPATIENT
Start: 2024-01-17

## 2024-01-17 RX ORDER — VALACYCLOVIR HYDROCHLORIDE 1 G/1
500 TABLET, FILM COATED ORAL 2 TIMES DAILY
Qty: 180 TABLET | Refills: 0 | Status: SHIPPED | OUTPATIENT
Start: 2024-01-17 | End: 2024-01-18

## 2024-01-17 RX ORDER — VALACYCLOVIR HYDROCHLORIDE 1 G/1
TABLET, FILM COATED ORAL
COMMUNITY
Start: 2023-11-24 | End: 2024-01-17

## 2024-01-17 RX ORDER — RESPIRATORY SYNCYTIAL VIRUS VACCINE 120MCG/0.5
0.5 KIT INTRAMUSCULAR ONCE
Qty: 1 EACH | Refills: 0 | Status: CANCELLED | OUTPATIENT
Start: 2024-01-17 | End: 2024-01-17

## 2024-01-17 RX ORDER — FLUTICASONE PROPIONATE 50 MCG
1 SPRAY, SUSPENSION (ML) NASAL DAILY
Qty: 18.2 ML | Refills: 0 | Status: SHIPPED | OUTPATIENT
Start: 2024-01-17 | End: 2024-08-06

## 2024-01-17 NOTE — PROGRESS NOTES
Assessment & Plan     Herpes simplex vulvovaginitis  New, frequent outbreaks in setting of immunocompromise (methotrexate and upadacitinib tx for RA). Plan to start suppressive therapy for patient. Consider discussion with Rheumatologist.   - valACYclovir (VALTREX) 1000 mg tablet  Dispense: 180 tablet; Refill: 0    Odynophagia  Hx of frequent genital HSV and recent shingles in setting of immunocompromised state. Noting unilateral odynophagia and otalgia (?referred pain) along with post-nasal drip. No red flag symptoms/signs. Plan to trial daily Flonase and refer to ENT for potential scope to r/o esophageal candidiasis vs. esophageal HSV vs other.   - fluticasone (FLONASE) 50 MCG/ACT nasal spray  Dispense: 18.2 mL; Refill: 0    Scalp tenderness  No recent injury. Discussed ice, trying to avoid sleeping on affected side.     Rheumatoid arthritis involving right hand with positive rheumatoid factor (H)  Immunocompromised (H24)  Follows with Rheumatology. On methotrexate and upadacitinib.     Moderate episode of recurrent major depressive disorder (H)  PHQ of 4 today. On desvenlafaxine and buspirone    Morbid obesity (H)  Likely contributory to GERD    Follow up prn    Duy Leyva MD  [facility] [place]  1/18/2024    Alejandro Miles is a 83 year old, presenting for the following health issues:  Medication Request (Requests valacyclovir for herpes) and Throat Pain (Head and ear pain.)        1/17/2024    12:55 PM   Additional Questions   Roomed by kb     History of Present Illness       Reason for visit:  Herpes and ear,head,throat pain.  Symptom onset:  1-2 weeks ago  Symptoms include:  Itch and pain  Symptom intensity:  Moderate  Symptom progression:  Staying the same  Had these symptoms before:  Yes  Has tried/received treatment for these symptoms:  No  What makes it worse:  No  What makes it better:  No    She eats 2-3 servings of fruits and vegetables daily.She consumes 0 sweetened beverage(s)  "daily.She exercises with enough effort to increase her heart rate 9 or less minutes per day.  She exercises with enough effort to increase her heart rate 4 days per week.   She is taking medications regularly.     Acute Illness  Acute illness concerns: right ear, throat and head pain  Onset/Duration: ongoing for a few weeks  Symptoms:  Fever: No  Chills/Sweats: No  Headache (location?): YES  Sinus Pressure: No  Conjunctivitis:  No  Ear Pain: YES  Rhinorrhea: YES  Congestion: No  Sore Throat: YES  Cough: no  Wheeze: No  Decreased Appetite: No  Nausea: No  Vomiting: No  Diarrhea: No  Dysuria/Freq.: No  Dysuria or Hematuria: No  Fatigue/Achiness: No  Sick/Strep Exposure: No  Therapies tried and outcome: Acetaminophen    Ear   Bothersome for the past 2 weeks AT LEAST, probably longer.   Ear pain deep inside.   Comes and goes, takes tylenol and feels better.   Seems to be more of a dull pain.   Also with some pain swallowing on that side too.   No voice changes. No sensation of lump in throat. No dysphagia.   Top of head on that side is also painful to the touch. Has not bumped it or injured it that she is aware of.   No cough.     Herpes  Feels like she has an outbreak of herpes on her mons pubis.   Has been there for about one week or less.  May actually be on both sides.   Has had frequent outbreaks in the past 6 months. At least three.  Would like to consider suppressive therapy for this.         Objective    /72   Pulse 89   Temp 97.7  F (36.5  C) (Tympanic)   Resp 16   Ht 1.664 m (5' 5.5\")   Wt 92.1 kg (203 lb)   SpO2 95%   BMI 33.27 kg/m    Body mass index is 33.27 kg/m .    Physical Exam   GENERAL: healthy, alert and no distress  HEAD: Normocephalic, atraumatic. Focal tenderness to palpation over right parietal scalp. No evidence of erythema, warmth, edema, ecchymosis.   EYES: PERRL. Normal conjunctivae, sclera.   ENT: Normal EAC and TMs bilaterally. Nares: Mildly boggy turbinates bilaterally. Normal " oropharynx.   NECK: Supple. No lymphadenopathy appreciated. Trachea midline. Thyroid not enlarged, not TTP.  RESP: lungs clear to auscultation - no rales, rhonchi or wheezes  CV: regular rate and rhythm, normal S1 S2, no murmur, click, rub or gallop.    : 2cm patch of pustules over an erythematous base on right mons pubis. Two erosions (1-2mm) on left mons pubis over erythematous base.   MSK: no gross musculoskeletal defects noted.  SKIN: no suspicious lesions or rashes.  EXT: Warm and well perfused.   NEURO: CNII-XII grossly intact. No focal deficits.  PSYCH: Groomed, dressed appropriately for weather. Normal mood with consistent affect.     Signed Electronically by: Duy Leyva MD

## 2024-01-18 ENCOUNTER — THERAPY VISIT (OUTPATIENT)
Dept: PHYSICAL THERAPY | Facility: CLINIC | Age: 84
End: 2024-01-18
Attending: STUDENT IN AN ORGANIZED HEALTH CARE EDUCATION/TRAINING PROGRAM
Payer: COMMERCIAL

## 2024-01-18 ENCOUNTER — TELEPHONE (OUTPATIENT)
Dept: OTOLARYNGOLOGY | Facility: CLINIC | Age: 84
End: 2024-01-18

## 2024-01-18 ENCOUNTER — PATIENT OUTREACH (OUTPATIENT)
Dept: CARE COORDINATION | Facility: CLINIC | Age: 84
End: 2024-01-18
Payer: COMMERCIAL

## 2024-01-18 DIAGNOSIS — H81.22 VESTIBULAR NEURITIS, LEFT: Primary | ICD-10-CM

## 2024-01-18 PROCEDURE — 97112 NEUROMUSCULAR REEDUCATION: CPT | Mod: GP | Performed by: PHYSICAL THERAPIST

## 2024-01-18 RX ORDER — VALACYCLOVIR HYDROCHLORIDE 500 MG/1
500 TABLET, FILM COATED ORAL 2 TIMES DAILY
Qty: 180 TABLET | Refills: 1 | Status: SHIPPED | OUTPATIENT
Start: 2024-01-18 | End: 2024-05-10

## 2024-01-18 NOTE — PROGRESS NOTES
Clinic Care Coordination Contact  [facility]    Clinical Data: Care Coordinator Outreach    Outreach Documentation Number of Outreach Attempt   12/12/2023  10:45 AM 1   1/18/2024  10:26 AM 1       Spoke briefly to pt. She is at the pharmacy currently and will be unavailable the rest of the day. Recommends CHW calling her tomorrow, 1/19/24, either morning or afternoon.    Plan: Care Coordinator will contact pt tomorrow, 1/19/24, per her request. She will be available all day to speak with CHW.    Melissa Mckeon  Community Health Worker  [facility]  [phone]

## 2024-01-18 NOTE — TELEPHONE ENCOUNTER
[facility] Call Center    Phone Message    May a detailed message be left on voicemail: yes     Reason for Call: Appointment Intake    Referring Provider Name: Duy Leyva MD   Diagnosis and/or Symptoms: Unilateral otalgia, odynophagia. Hx of RA, on methotrexate and upadacitinib and hx of recent shingles, recurrent genital HSV. ?esophageal candidiasis/HSV     PRIORITY REFERRAL    PLEASE REVIEW, UNABLE TO GET AHOLD OF PRIORITY LINE WHEN I CALLED.    STATES HER EAR PAIN FEELS BETTER TODAY, BUT WAS EXTREMELY PAINFUL LAST NIGHT AND NO LONGER HAS PAINFUL SWALLOWING.     Action Taken: Message routed to:  [facility] ([facility] ENT    Travel Screening: Not Applicable

## 2024-01-19 ENCOUNTER — PATIENT OUTREACH (OUTPATIENT)
Dept: CARE COORDINATION | Facility: CLINIC | Age: 84
End: 2024-01-19
Payer: COMMERCIAL

## 2024-01-19 NOTE — TELEPHONE ENCOUNTER
Patient has not been seen by Dr. Espinosa in over 3 years and would be considered a new patient per the protocol. Patient can be seen by any provider.        Patti PEREIRA, Specialty RN 1/19/2024 11:21 AM

## 2024-01-19 NOTE — PROGRESS NOTES
Clinic Care Coordination Contact  Community Health Worker Follow Up    Care Gaps: Pt aware of urine drug screen and Lipid lab which are due. Recommended pt discuss with Dr. Leyva at Medicare Annual Wellness Visit on 2/28/24    Health Maintenance Due   Topic Date Due    URINE DRUG SCREEN  Never done    RSV VACCINE (Pregnancy & 60+) (1 - 1-dose 60+ series) Never done    COVID-19 Vaccine (3 - Pfizer risk series) 03/31/2021    EYE EXAM  08/18/2021    LIPID  01/16/2024       Care Gap Goal set: Yes    Care Plan:   Care Plan: Specialty Referral       Problem: Patient is in need of specialty care       Long-Range Goal: Establish consistent relationship with specialist(s) as needed       Start Date: 6/8/2023 Expected End Date: 5/31/2024    This Visit's Progress: 90% Recent Progress: 80%    Note:     Barriers: diagnosis of multiple, chronic, complex medical conditions, provider availability - wait time to complete appointments, etc.   Strengths: motivated, engaged in care coordination.   Patient expressed understanding of goal: yes  Action steps to achieve this goal:  1. I will follow up with my providers as scheduled/recommended.    Mental Health: 12/19/23 (meets 1-2x/mo with therapist)   Eye exam:10/3/23 per patient   MT Pharmacy: 7/31/23 - follow up recommended 6 months; 01/2024 TBD (1/19/24 - transferred call to [facility] scheduling line to schedule with Alise Harley)   Endocrinology: 08/02/23 - biopsy 08/10/23 - needs to schedule follow up TBD   Rheumatology: 12/01/23 - follow up scheduled 02/06/24   Sleep: 01/24/2024   Primary Care Provider 02/28/2024   Dexa: 02/06/24   Podiatry 05/11/23 - declines to follow up at this time.    Physical Therapy: (in progress at [facility] - 11/1/23)(12/12/23 - one more PT session after [facility]).  2. I will discuss, review, schedule and complete recommended overdue health maintenance with my Primary Care Provider.   3. I will contact my care team with questions, concerns, support needs. I  will use the clinic as a resource and I understand I can contact my clinic with 24/7 after hours services available. Care Coordinator will remain available as needed.      Goal updated 1/19/24                                Intervention and Education during outreach:   OP PT - ongoing appointments for vestibular rehabilitation. Pt reports getting dizzy in her daily activities especially when she bends over.   Vestibular neuritis - it is getting better. She was able to enjoy [place] with her family.  MTM - last visit with HSANNAN Bella, as on 6/26/23. At that time, Alise recommended a f/u visit in 6 months. Pt is willing to schedule this visit. Transferred call to [facility] scheduling at end of our visit to schedule.  Upcoming medical appointments - reviewed all upcoming medical appointments up to 2/28/24 Medicare Annual Wellness Visit with Dr. Leyva. Pt is aware of dates and times for all her appointments.  CHW asks if pt has any further concerns or need for resources as this time. Pt states she does not. CHW made sure pt has CHW contact information. Pt will contact CHW with questions or updates before next outreach.     CHW Plan: CHW will follow up with pt in one month.    Melissa Mckeon  Community Health Worker  [facility]  [phone]

## 2024-01-22 ENCOUNTER — VIRTUAL VISIT (OUTPATIENT)
Dept: PSYCHOLOGY | Facility: CLINIC | Age: 84
End: 2024-01-22
Payer: COMMERCIAL

## 2024-01-22 DIAGNOSIS — F33.1 MODERATE EPISODE OF RECURRENT MAJOR DEPRESSIVE DISORDER (H): Primary | ICD-10-CM

## 2024-01-22 PROCEDURE — 90837 PSYTX W PT 60 MINUTES: CPT | Mod: 95

## 2024-01-22 ASSESSMENT — PATIENT HEALTH QUESTIONNAIRE - PHQ9
SUM OF ALL RESPONSES TO PHQ QUESTIONS 1-9: 5
SUM OF ALL RESPONSES TO PHQ QUESTIONS 1-9: 5
10. IF YOU CHECKED OFF ANY PROBLEMS, HOW DIFFICULT HAVE THESE PROBLEMS MADE IT FOR YOU TO DO YOUR WORK, TAKE CARE OF THINGS AT HOME, OR GET ALONG WITH OTHER PEOPLE: SOMEWHAT DIFFICULT

## 2024-01-22 NOTE — TELEPHONE ENCOUNTER
RN left message for patient to return call to clinic.  Provided call back number of [phone] to return call today until 330.  Directed to call main line if returning call after that time.    Chloé Sousa MSN, RN   Specialty Clinic, 1/22/2024 8:19 AM

## 2024-01-22 NOTE — PROGRESS NOTES
[facility]                                     Progress Note    Patient Name: Ginger Marshall  Date: 1/22/24         Service Type: Individual      Session Start Time: 9:00 am  Session End Time: 9:53 am     Session Length: 53 min    Session #: 13    Answers submitted by the patient for this visit:  Patient Health Questionnaire (Submitted on 9/26/2023)  If you checked off any problems, how difficult have these problems made it for you to do your work, take care of things at home, or get along with other people?: Somewhat difficult  PHQ9 TOTAL SCORE: 4    Attendees: Client attended alone    Service Modality:  Video Visit:      Provider verified identity through the following two step process.  Patient provided:  Patient is known previously to provider    Telemedicine Visit: The patient's condition can be safely assessed and treated via synchronous audio and visual telemedicine encounter.      Reason for Telemedicine Visit: Patient convenience (e.g. access to timely appointments / distance to available provider)    Originating Site (Patient Location): Patient's home    Distant Site (Provider Location): Provider Remote Setting- Home Office    Consent:  The patient/guardian has verbally consented to: the potential risks and benefits of telemedicine (video visit) versus in person care; bill my insurance or make self-payment for services provided; and responsibility for payment of non-covered services.     Patient would like the video invitation sent by:  My Chart    Mode of Communication:  Video Conference via Amwell    Distant Location (Provider):  Off-site    As the provider I attest to compliance with applicable laws and regulations related to telemedicine.    DATA  Interactive Complexity: No     Crisis: No    Extended Session (53+ minutes): PROLONGED SERVICE IN THE OUTPATIENT SETTING REQUIRING DIRECT (FACE-TO-FACE) PATIENT CONTACT BEYOND THE USUAL SERVICE:    - Longer session due to limited access  to mental health appointments and necessity to address patient's distress / complexity  Interactive Complexity: No  Crisis: No      Progress Since Last Session (Related to Symptoms / Goals / Homework):   Symptoms: No change   , low energy    Homework: Partially completed      Episode of Care Goals: Minimal progress - ACTION (Actively working towards change); Intervened by reinforcing change plan / affirming steps taken     Current / Ongoing Stressors and Concerns:  Vertigo, head pain, exhaustion getting in the way of pt at home, managing household. Distress regarding pregnant unmarried granddaughter, lack of contact with sister, limited to no contact with son's adult children Low quality sleep, even with CPAP-waking up tired, nap in afternoon. Focus on committees,  socializing with various Nondenominational groups.   Ongoing: Grieving loss of adult son who was blind and lived alone, fell and passed away, second loss of an adult/child. Regrets about parenting.    Treatment Objective(s) Addressed in This Session:    Practice boundaries and radical acceptance of reality regarding sister, sons, reality of accident  Improve quantity and quality of night time sleep / decrease daytime naps      Intervention:  Supportive therapy: Provided active listening and validation. Provided psychoeducation on boundaries, applied to recent family interactions.    Motivational Interviewing  Target Behavior:  radical acceptance of reality, gratitude ,   Proactive communication with providers    MI Intervention: Expressed Empathy/Understanding, Supported Autonomy, Collaboration, Evocation and Open-ended questions     Change Talk Expressed by the Patient: Desire to change Reasons to change Activation    Provider Response to Change Talk: E - Evoked more info from patient about behavior change and A - Affirmed patient's thoughts, decisions, or attempts at behavior change    Assessments completed prior to visit:  LAURA  12/5/22  The following assessments  were completed by patient for this visit:  PHQ2:       2/27/2023     8:50 AM 2/24/2023     6:48 AM 2/20/2023     5:39 PM 1/15/2023    11:54 AM 11/16/2022     1:33 PM 8/5/2022     8:11 AM 7/6/2022     4:18 PM   PHQ-2 ( 1999 [employer])   Q1: Little interest or pleasure in doing things 0 1 1 1 1 3    Q2: Feeling down, depressed or hopeless 0 1 1 1 1 0    PHQ-2 Score 0 2 2 2 2 3    Q1: Little interest or pleasure in doing things  Several days Several days Several days Several days Nearly every day    Q2: Feeling down, depressed or hopeless  Several days Several days Several days Several days Not at all    PHQ-2 Score  2 2 2 2 3 Incomplete     PHQ9:       6/5/2023     2:31 PM 8/23/2023     8:30 AM 9/26/2023     9:50 AM 11/19/2023    10:24 AM 11/27/2023    12:37 PM 1/16/2024     6:18 PM 1/22/2024     8:44 AM   PHQ-9 SCORE   PHQ-9 Total Score MyChart  5 (Mild depression) 4 (Minimal depression) 6 (Mild depression) 6 (Mild depression) 4 (Minimal depression) 5 (Mild depression)   PHQ-9 Total Score 10 5 4 6 6 4 5     Patient Health Questionnaire (Submitted on 9/26/2023)  If you checked off any problems, how difficult have these problems made it for you to do your work, take care of things at home, or get along with other people?: Somewhat difficult  PHQ9 TOTAL SCORE: 4  GAD2:       2/2/2023     7:27 AM 2/23/2023     8:41 AM 3/12/2023    10:36 AM 8/23/2023     8:31 AM 9/23/2023    10:05 AM 11/19/2023    10:24 AM 1/15/2024    10:35 AM   SPRING-2   Feeling nervous, anxious, or on edge 1 1 1 0 0 0 1   Not being able to stop or control worrying 1 0 0 0 0 0 0   SPRING-2 Total Score 2 1 1    1 0 0 0 1     GAD7:       2/22/2018     1:46 PM 6/5/2019     4:10 PM 6/16/2020     9:54 AM 7/6/2022     4:18 PM 8/5/2022     8:11 AM 11/16/2022     1:33 PM 6/5/2023     2:31 PM   SPRING-7 SCORE   Total Score    2 (minimal anxiety) 5 (mild anxiety) 2 (minimal anxiety)    Total Score 0 0 7 2 5 2 3     CAGE-AID:       12/5/2022     9:28 AM   CAGE-AID Total  Score   Total Score 0   Total Score MyChart 0 (A total score of 2 or greater is considered clinically significant)     PROMIS 10-Global Health (only subscores and total score):       9/21/2017    11:00 AM 11/15/2018     1:00 PM 3/25/2022     7:34 AM 12/5/2022     9:28 AM 3/12/2023    10:38 AM 8/23/2023     8:34 AM 12/14/2023     6:28 AM   PROMIS-10 Scores Only   Global Mental Health Score 13 12 12 8    8 8    8 12 13   Global Physical Health Score 14 12 12 13    13 11    11 12 12   PROMIS TOTAL - SUBSCORES 27 24 24 21    21 19    19 24 25     [place] Suicide Severity Rating Scale (Lifetime/Recent)      12/5/2022    11:00 AM   [place] Suicide Severity Rating (Lifetime/Recent)   Q1 Wished to be Dead (Past Month) no   Q2 Suicidal Thoughts (Past Month) no   Q3 Suicidal Thought Method no   Q4 Suicidal Intent without Specific Plan no   Q5 Suicide Intent with Specific Plan no   Q6 Suicide Behavior (Lifetime) no   Level of Risk per Screen low risk         ASSESSMENT: Current Emotional / Mental Status (status of significant symptoms):   Risk status (Self / Other harm or suicidal ideation)   Patient denies current fears or concerns for personal safety.   Patient denies current or recent suicidal ideation or behaviors.   Patient denies current or recent homicidal ideation or behaviors.   Patient denies current or recent self injurious behavior or ideation.   Patient denies other safety concerns.   Patient reports there has been no change in risk factors since their last session.     Patient reports there has been no change in protective factors since their last session.     Recommended that patient call 911 or go to the local ED should there be a change in any of these risk factors.     Appearance:   Appropriate    Eye Contact:   Good    Psychomotor Behavior: Normal    Attitude:   Pleasant Attentive   Orientation:   All   Speech    Rate / Production: Emotional Normal     Volume:  Normal    Mood:    Depressed     Affect:    Appropriate    Thought Content:  Clear    Thought Form:  Coherent  Logical  Circumstantial   Insight:    Good      Medication Review:   No changes to current psychiatric medication(s)     Medication Compliance:   Yes     Changes in Health Issues:   None reported     Chemical Use Review:   Substance Use: Chemical use reviewed, no active concerns identified      Tobacco Use: No change in amount of tobacco use since last session.  Patient declined discussion at this time    Diagnosis:  1. Moderate episode of recurrent major depressive disorder (H)          Collateral Reports Completed:   Not Applicable    PLAN: (Patient Tasks / Therapist Tasks / Other)   Embrace radical acceptance and boundaries. Use proactive communication with providers    HECTOR Springer 1/22/2024                                                                                                Individual Treatment Plan    Patient's Name: Ginger Marshall  YOB: 1940    Date of Creation: 2/8/23  Date Treatment Plan Last Reviewed/Revised:   12/19/2023    DSM5 Diagnoses: 296.31 (F33.0) Major Depressive Disorder, Recurrent Episode, Mild With anxious distress  Psychosocial / Contextual Factors: aging, losses, generational trauma  PROMIS (reviewed every 90 days):   21  12/5/22    Referral / Collaboration:  Referral to another professional/service is not indicated at this time..    Anticipated number of session for this episode of care: 6-9 sessions  Anticipation frequency of session: Every other week  Anticipated Duration of each session: 38-52 minutes  Treatment plan will be reviewed in 90 days or when goals have been changed.     MeasurableTreatment Goal(s) related to diagnosis / functional impairment(s)  Goal 1: Patient will experience an improvement in mood and functioning as evidenced by assessment scores, self report and clinician observation    I will know I've met my goal when things feel better (paraphrase).       Objective #A (Patient Action)    Patient will increase understanding of steps in the grief process   Talk to others about losses  Use prayer practices and jena community to support well being.   Practice boundaries and radical acceptance of reality regarding sister sons, reality of accident   Engage in social activities at [facility]  Status: 12/19/2023    Intervention(s)  Therapist will teach CBT, DBT  and support grief processing skills, prayer practices .    Objective #B  Patient will Increase interest, engagement, and pleasure in doing things  Decrease frequency and intensity of feeling down, depressed, hopeless  Improve quantity and quality of night time sleep / decrease daytime naps  Feel less tired and more energy during the day   Improve diet, appetite, mindful eating, and / or meal planning  Identify negative self-talk and behaviors: challenge core beliefs, myths, and actions  Improve concentration, focus, and mindfulness in daily activities   Feel less fidgety, restless or slow in daily activities / interpersonal interactions.  Status: 12/19/2023    Intervention(s)  Therapist will  teach cbt, dbt,MI, mindfulness and behavioral activation and assign homework .      Patient has reviewed and agreed to the above plan.      Rocío Arenas, [facility] Hospital[facility]  1/22/2024

## 2024-01-24 ENCOUNTER — PATIENT OUTREACH (OUTPATIENT)
Dept: CARE COORDINATION | Facility: CLINIC | Age: 84
End: 2024-01-24

## 2024-01-24 NOTE — TELEPHONE ENCOUNTER
[facility]    Phone Message    May a detailed message be left on voicemail: yes     Reason for Call: Other: Pt returning call. Please call. Thanks.     Action Taken: Other: ENT    Travel Screening: Not Applicable

## 2024-01-24 NOTE — PROGRESS NOTES
Clinic Care Coordination Contact  Care Coordination Clinician Chart review    Situation: Patient chart reviewed by care coordinator.       Background: Care Coordination initial assessment and enrollment took place 6/5/2023.   Upon initial assessment patient-centered goals were discussed and developed with participation from patient.  RNCC handed patient follow up and monitoring of goal progression off to [facility] for continued outreach every 30 days.        Assessment: Per chart review, patient outreach completed by CC CHW on 01/19/2024.  Patient is actively working to accomplish goal(s) and patient's goal(s) remain(s) appropriate and relevant at this time.       Care Plan: Specialty Referral       Problem: Patient is in need of specialty care       Long-Range Goal: Establish consistent relationship with specialist(s) as needed       Start Date: 6/8/2023 Expected End Date: 5/31/2024    This Visit's Progress: 90% Recent Progress: 80%    Note:     Barriers: diagnosis of multiple, chronic, complex medical conditions, provider availability - wait time to complete appointments, etc.   Strengths: motivated, engaged in care coordination.   Patient expressed understanding of goal: yes  Action steps to achieve this goal:  1. I will follow up with my providers as scheduled/recommended.    Mental Health: 12/19/23 (meets 1-2x/mo with therapist)   Eye exam:10/3/23 per patient   [facility]: 7/31/23 - follow up recommended 6 months; 01/2024 TBD (1/19/24 - transferred call to [facility] scheduling line to schedule with Alise Harley)   Endocrinology: 08/02/23 - biopsy 08/10/23 - needs to schedule follow up TBD   Rheumatology: 12/01/23 - follow up scheduled 02/06/24   Sleep: 01/24/2024   Primary Care Provider 02/28/2024   Dexa: 02/06/24   Podiatry 05/11/23 - declines to follow up at this time.    Physical Therapy: (in progress at [facility] - 11/1/23)(12/12/23 - one more PT session after [place]).  2. I will discuss, review, schedule and  complete recommended overdue health maintenance with my Primary Care Provider.   3. I will contact my care team with questions, concerns, support needs. I will use the clinic as a resource and I understand I can contact my clinic with 24/7 after hours services available. Care Coordinator will remain available as needed.      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   Patient expressed understanding of goal: Yes  Action steps to achieve this goal:  1. I will review the following resources: (completed)  Hot Meals:   Easter by the [facility]                          [address], [place] [address]  Note: Please do NOT go to the building located at [address]. Loaves & Fishes meals are served at the corner of [address] and [address].     Meals Served: Monday - Thursday from 5:30 - 6:30 PM  Service Location: Meals served at the [facility] front doors (2 white double doors)  [employer] - [phone]            [address] [facility]. [facility], [address]  2nd Thursday of each month - curbside available. 5:30-6:30 pm     [facility] - [phone]                        [address] [place] [address]  Wednesdays, 5:45-6:25 pm during school year only.      [facility] [employer] - [phone]        [address] [facility] [address]  Every 3rd and 4th Thursday, 5:00-6:00 pm.                    Curbside only due to COVID-19.  Those wishing a  meal should enter the lower parking lot and follow directions. Meals are picked up by Door 1.     [facility] - [phone]     [address], [place], [place]   Drive Through Meals: Monday 5:30-6:30 pm     [facility] - [phone]  [address], [place] [address]  Monday thru Thursday, 5:30-6:30 pm. Curbside only due to COVID-19.     Food Pantries:   [facility] [phone] [address] [place], [place], [address]   Partners with local charities and offers the [address] [place] [address], formerly [facility] Food Shelves      [facility] - [phone] [address] [place] [address] Hours: Tue 10:20 AM - 3:40 PM , Thu 10:20 AM - 3:40 PM  Fees: Free       The main service they offer is [employer] food shelf. They also run several federal government USDA supported programs in [facility]. This is run in partnership with [address] [place] [address] and other local social service agencies.      Demetria [facility] - [phone]  [address] [place] [address]    Hours: Tue 11:30 AM - 12:30 PM  Fees: Self Pay       [facility] - [phone] [address] [place] [address]   Hours: Mon - Wed 10:00 AM - 3:00 PM , Tue 5:30 PM - 7:30 PM , Thu 5:30 PM - 7:30 PM , Thu - Fri 10:00 AM - 12:00 PM  Fees: Free       [facility]  [phone]   Serves families in [facility]. Programs can provide food, meals, and other non-financial support.      [facility] [phone] I   [address] [place], [place], [address]   This location operates a food shelf and other social service programs. Assistance offered is extensive, and includes personal hygiene supplies, household products, laundry and household . Or speak to a  and apply for other resources  such as food stamps, TANF welfare, or more.      A second location of the [facility] is located at [address] [place], [address]. They offer many of the same programs and resources as indicated above.      [facility] Food Shelf - [phone] [address] [place] [address] Hours: Tue 10:00 PM - 3:00 PM Appt. Only, Thu 12:00 PM - 6:00 PM Appt. Only  Fees: Free  (7/24/23 - going to check into this resource)     [facility]  - [phone] [address] [place] [address]  Hours: Sat 11:30 PM - 12:30 PM  Fees: Free       [facility] [phone]   [address] [place], [place], [address]   The non-profit offers Free Food for seniors and other income qualified elderly and residents. The main resource they offer from this facility is the Nutrition Assistance for Seniors (NAPS) supplemental food program. Government commodities, vouchers, and other food assistance is provided     [facility] [phone] [address] [place], [place], [address]      [facility] - [phone] [address] [facility] [place] [address]  Hours: Tue 1:00 PM - 3:30 PM  Fees: Free    [address] [place] [address] - Coordinated Entry   [address] [place] [address]  Fees: Free [phone]   Hours: Mon - Thu 9:00 AM - 4:00 PM      [facility]   [address] [place] [address]  Fees: Free [phone]  Hours: Mon 4:00 PM - 6:00 PM Tue 11:00 AM - 1:00 PM , Wed 4:00 PM - 6:00 PM , Thu 10:30 AM - 12:30 PM, 2-3 PM      [facility] [address] [place] [address]  Fees: Free [phone]  Hours: Tue - Sat 10:00 AM - 6:00 PM      [facility] - [phone] [address]   [address] [place], [place] [address]Ubkcf: Tue 10:00 AM - 12:00 PM  Fees: Free      Additional food pantries operate in [place]  [place] [address] [phone]   The centers may offer groceries, hot meals, and special seasonal programs such as summer snacks for students or free [place] and Thanksgiving meals      2. I will sign up for [employer] program and is utilizing $80 per month at [facility] location in her area (completed)                                Plan/Recommendations: RNCC Clinical Assessments to be completed annually or as needed. Annual Assessment will be due 06/2024. The patient will continue working with Care Coordination to achieve goal(s) as above.  [facility] will involve RNCC as needed or if patient is ready to transition to goal status of Maintenance.  RNCC will continue to review progress to goal(s) and [facility] outreaches every 6 weeks.     Complex Care Plan:  Patient is not due for updated Plan of Care.  Care Plan updated and sent to patient: Rhina Awad RN Care Coordinator  [facility]  Email: [email or web address]  Phone: [phone]

## 2024-01-25 ENCOUNTER — MYC MEDICAL ADVICE (OUTPATIENT)
Dept: FAMILY MEDICINE | Facility: CLINIC | Age: 84
End: 2024-01-25

## 2024-01-25 ENCOUNTER — THERAPY VISIT (OUTPATIENT)
Dept: PHYSICAL THERAPY | Facility: CLINIC | Age: 84
End: 2024-01-25
Attending: STUDENT IN AN ORGANIZED HEALTH CARE EDUCATION/TRAINING PROGRAM
Payer: COMMERCIAL

## 2024-01-25 DIAGNOSIS — D64.9 ANEMIA, UNSPECIFIED TYPE: Primary | ICD-10-CM

## 2024-01-25 DIAGNOSIS — H81.22 VESTIBULAR NEURITIS, LEFT: Primary | ICD-10-CM

## 2024-01-25 PROCEDURE — 97112 NEUROMUSCULAR REEDUCATION: CPT | Mod: GP | Performed by: PHYSICAL THERAPIST

## 2024-01-25 ASSESSMENT — SLEEP AND FATIGUE QUESTIONNAIRES
HOW LIKELY ARE YOU TO NOD OFF OR FALL ASLEEP WHILE SITTING INACTIVE IN A PUBLIC PLACE: SLIGHT CHANCE OF DOZING
HOW LIKELY ARE YOU TO NOD OFF OR FALL ASLEEP WHEN YOU ARE A PASSENGER IN A CAR FOR AN HOUR WITHOUT A BREAK: WOULD NEVER DOZE
HOW LIKELY ARE YOU TO NOD OFF OR FALL ASLEEP WHILE SITTING QUIETLY AFTER LUNCH WITHOUT ALCOHOL: HIGH CHANCE OF DOZING
HOW LIKELY ARE YOU TO NOD OFF OR FALL ASLEEP WHILE WATCHING TV: MODERATE CHANCE OF DOZING
HOW LIKELY ARE YOU TO NOD OFF OR FALL ASLEEP WHILE SITTING AND READING: HIGH CHANCE OF DOZING
HOW LIKELY ARE YOU TO NOD OFF OR FALL ASLEEP WHILE LYING DOWN TO REST IN THE AFTERNOON WHEN CIRCUMSTANCES PERMIT: HIGH CHANCE OF DOZING
HOW LIKELY ARE YOU TO NOD OFF OR FALL ASLEEP WHILE SITTING AND TALKING TO SOMEONE: WOULD NEVER DOZE
HOW LIKELY ARE YOU TO NOD OFF OR FALL ASLEEP IN A CAR, WHILE STOPPED FOR A FEW MINUTES IN TRAFFIC: WOULD NEVER DOZE

## 2024-01-26 NOTE — TELEPHONE ENCOUNTER
Please see MCM.  Did you want to add more labs to pt's pending lab?     Routing to PCP to review and advise.     Shefali Edwards RN     Low albumin  ensure enlive TID

## 2024-01-29 ENCOUNTER — LAB (OUTPATIENT)
Dept: LAB | Facility: CLINIC | Age: 84
End: 2024-01-29
Payer: COMMERCIAL

## 2024-01-29 ENCOUNTER — OFFICE VISIT (OUTPATIENT)
Dept: SLEEP MEDICINE | Facility: CLINIC | Age: 84
End: 2024-01-29
Payer: COMMERCIAL

## 2024-01-29 VITALS
BODY MASS INDEX: 33.46 KG/M2 | OXYGEN SATURATION: 95 % | DIASTOLIC BLOOD PRESSURE: 83 MMHG | HEIGHT: 66 IN | WEIGHT: 208.2 LBS | SYSTOLIC BLOOD PRESSURE: 142 MMHG | HEART RATE: 68 BPM

## 2024-01-29 DIAGNOSIS — G47.8 UNREFRESHED BY SLEEP: ICD-10-CM

## 2024-01-29 DIAGNOSIS — D64.9 ANEMIA, UNSPECIFIED TYPE: ICD-10-CM

## 2024-01-29 DIAGNOSIS — E66.811 OBESITY (BMI 30.0-34.9): ICD-10-CM

## 2024-01-29 DIAGNOSIS — E78.5 HYPERLIPIDEMIA LDL GOAL <100: Primary | ICD-10-CM

## 2024-01-29 DIAGNOSIS — R53.83 FATIGUE, UNSPECIFIED TYPE: ICD-10-CM

## 2024-01-29 DIAGNOSIS — F41.9 ANXIETY AND DEPRESSION: ICD-10-CM

## 2024-01-29 DIAGNOSIS — E83.19 OTHER DISORDERS OF IRON METABOLISM: ICD-10-CM

## 2024-01-29 DIAGNOSIS — M05.79 RHEUMATOID ARTHRITIS INVOLVING MULTIPLE SITES WITH POSITIVE RHEUMATOID FACTOR (H): ICD-10-CM

## 2024-01-29 DIAGNOSIS — F32.A ANXIETY AND DEPRESSION: ICD-10-CM

## 2024-01-29 DIAGNOSIS — G47.33 OSA (OBSTRUCTIVE SLEEP APNEA): Primary | Chronic | ICD-10-CM

## 2024-01-29 LAB
CHOLEST SERPL-MCNC: 210 MG/DL
FASTING STATUS PATIENT QL REPORTED: YES
FERRITIN SERPL-MCNC: 69 NG/ML (ref 11–328)
FOLATE SERPL-MCNC: 18.6 NG/ML (ref 4.6–34.8)
HDLC SERPL-MCNC: 78 MG/DL
IRON BINDING CAPACITY (ROCHE): 360 UG/DL (ref 240–430)
IRON SATN MFR SERPL: 19 % (ref 15–46)
IRON SERPL-MCNC: 70 UG/DL (ref 37–145)
LDLC SERPL CALC-MCNC: 105 MG/DL
NONHDLC SERPL-MCNC: 132 MG/DL
TRIGL SERPL-MCNC: 133 MG/DL
TSH SERPL DL<=0.005 MIU/L-ACNC: 1.11 UIU/ML (ref 0.3–4.2)

## 2024-01-29 PROCEDURE — 99215 OFFICE O/P EST HI 40 MIN: CPT | Performed by: INTERNAL MEDICINE

## 2024-01-29 PROCEDURE — 82728 ASSAY OF FERRITIN: CPT

## 2024-01-29 PROCEDURE — 83540 ASSAY OF IRON: CPT

## 2024-01-29 PROCEDURE — 84443 ASSAY THYROID STIM HORMONE: CPT

## 2024-01-29 PROCEDURE — 80061 LIPID PANEL: CPT

## 2024-01-29 PROCEDURE — 82746 ASSAY OF FOLIC ACID SERUM: CPT

## 2024-01-29 PROCEDURE — 36415 COLL VENOUS BLD VENIPUNCTURE: CPT

## 2024-01-29 PROCEDURE — 83550 IRON BINDING TEST: CPT

## 2024-01-29 NOTE — NURSING NOTE
"Chief Complaint   Patient presents with    Sleep Problem     Guillermo, cpap check       Initial BP (!) 150/82   Pulse 68   Ht 1.664 m (5' 5.5\")   Wt 94.4 kg (208 lb 3.2 oz)   SpO2 95%   BMI 34.12 kg/m   Estimated body mass index is 34.12 kg/m  as calculated from the following:    Height as of this encounter: 1.664 m (5' 5.5\").    Weight as of this encounter: 94.4 kg (208 lb 3.2 oz).    Medication Reconciliation: complete  ESS 12  MAY 13  DME: BURAK Freedman MA      "

## 2024-01-29 NOTE — TELEPHONE ENCOUNTER
Appears she has long-standing anemia with low iron stores on last check (back in July, 2023). This can cause fatigue.     Please ask her if she is still taking an iron supplement. If she is not, I would recommend restarting.     In the meantime, I have added on a couple labs to evaluate for fatigue. Please let her know.    Thanks,    Duy Leyva MD  [facility]  1/29/2024

## 2024-01-29 NOTE — PATIENT INSTRUCTIONS
We have adjusted the pressure settings on your CPAP to range 11 to 16 cm water and provided prescription for renewal of CPAP supplies.  Recommend  continuing the use of CPAP regularly during sleep and getting the supplies for the CPAP replaced regularly.   Please remember to bring PAP with you if hospitalized and if anticipating procedure that requires sedation/surgery to be sure to discuss with the provider/surgeon that you have  sleep apnea and use CPAP therapy.     Please continue follow up with your Rheumatologist.    Please  follow up with your primary care provider for optimizing the management of anxiety and depression.     Remember to Drive Safe... Drive Alive     Sleep health profoundly affects your health, mood, and your safety.  Thirty three percent of the population (one in three of us) is not getting enough sleep and many have a sleep disorder. Not getting enough sleep or having an untreated / undertreated sleep condition may make us sleepy without even knowing it. In fact, our driving could be dramatically impaired due to our sleep health. As your provider, here are some things I would like you to know about driving:     Here are some warning signs for impairment and dangerous drowsy driving:              -Having been awake more than 16 hours               -Looking tired               -Eyelid drooping              -Head nodding (it could be too late at this point)              -Driving for more than 30 minutes     Some things you could do to make the driving safer if you are experiencing some drowsiness:              -Stop driving and rest              -Call for transportation              -Make sure your sleep disorder is adequately treated     Some things that have been shown NOT to work when experiencing drowsiness while driving:              -Turning on the radio              -Opening windows              -Eating any  distracting  /  entertaining  foods (e.g., sunflower seeds, candy, or any  other)              -Talking on the phone      Your decision may not only impact your life, but also the life of others. Please, remember to drive safe for yourself and all of us.      WEIGHT:  Your BMI is Body mass index is 34.12 kg/m .       What is BMI?  Body mass index (BMI) is one way to tell whether you are at a healthy weight, overweight, or obese. It measures your weight in relation to your height.  A BMI of 18.5 to 24.9 is in the healthy range. A person with a BMI of 25 to 29.9 is considered overweight, and someone with a BMI of 30 or greater is considered obese.  Another way to find out if you are at risk for health problems caused by overweight and obesity is to measure your waist. If you are a woman and your waist is more than 35 inches, or if you are a man and your waist is more than 40 inches, your risk of disease may be higher.  More than two-thirds of American adults are considered overweight or obese. Being overweight or obese increases the risk for further weight gain.  Excess weight may lead to heart disease and diabetes. Creating and following plans for healthy eating and physical activity may help you improve your health.    Methods for maintaining or losing weight.  Weight control is part of healthy lifestyle and includes exercise, emotional health, and healthy eating habits.  Careful eating habits lifelong is the mainstay of weight control.  Though there are significant health benefits from weight loss, long-term weight loss with diet alone may be very difficult to achieve- studies show long-term success with dietary management in less than 10% of people. Attaining a healthy weight may be especially difficult to achieve in those with severe obesity. In some cases, medications, devices and surgical management might be considered.    What can you do?  If you are overweight or obese and are interested in methods for weight loss, you should discuss this with your provider. In addition, we  recommend that you review healthy life styles and methods for weight loss available through the National [employer] of [employer] patient information sites:   http://win.niddk.nih.gov/publications/index.htm

## 2024-01-29 NOTE — PROGRESS NOTES
[facility]  Sleep clinic follow-up visit note     Date:1/29/2024     Chief complaint:  Follow-up of SHERRY, review CPAP compliance measures     Ginger Marshall is an 83 year old  female who presents to sleep clinic for follow-up of previously diagnosed obstructive sleep apnea that is currently managed with CPAP therapy.  She was last seen at sleep clinic in March 2021.  She set up at [place]  on December 17, 2020. Patient received a Resmed AirSense 10 Auto. Pressures were set at 8-15 cm H2O.   Patient s ramp is 8 cm H2O for Off and FLEX/EPR is EPR, 1.  Patient received a Resmed Mask name: Airfit  Nasal mask size Medium, heated tubing and heated humidifier.     Previous sleep study report:   Study date: 1/20/2024  Weight:214 lbs  AHI 35.4, RDI 55.1, O2 magy 88%. CPAP was titrated to an effective pressure of 9 cm/H20 in lateral REM sleep. PLM index 46.6 on CPAP, PLM index on CPAP was 99.   She started CPAP 9 cm/H20 on 2/11/2014.    Pressure settings on auto CPAP range between 10 to 15 cmH2O  Patient reports using the CPAP regularly during sleep, but she reports couple time a week, she  takes the mask off during the night after  waking up in the middle of the night.  She uses nasal mask. Reports some air leaks  Patient sleeps alone so there is not anyone to report snoring or apneas with CPAP use.    There are no reports of awakenings due to gasping with the device use.   She reports air hunger when she puts the mask on.   Bedtime time:9-10 pm.   Wake time: between 4-4:30 am but she does not get out of bed until 5/5:30 am. On an avg she reports getting 6-7 hours of sleep per night  Arthritic pain mostly in hips and low back affects sleep.  She also reports depression.  Patient reports non-restorative sleep, fatigue and excessive daytime sleepiness. ESS: 9 /24.  There are reports of occasional drowsiness while driving, but she denies close calls or accidents     DOWNLOADABLE COMPLIANCE DATA:   ResMed  "  Auto-PAP 10.0 - 15.0 cmH2O 30 day usage data:    96% of days with > 4 hours of use. 1/30 days with no use.   Average use 441 minutes per day.   95%ile Leak 26.52 L/min.   CPAP 95% pressure 13.5 cm.   AHI 4.11 events per hour.      Past medical/surgical history, family history, social history, medications and allergies were reviewed.       Problem list:  Patient Active Problem List   Diagnosis    Rheumatoid arthritis involving right hand with positive rheumatoid factor (H)    History of colonic polyps    Multinodular goiter    Pulmonary nodule    PSEUDOPHAKIA OU    PVD (POSTERIOR VITREOUS DETACHMENT) OU    PXF (PSEUDOEXFOLIATION OF LENS CAPSULE) OD    GERD (gastroesophageal reflux disease)    Hyperlipidemia LDL goal <100    Advance Care Planning    Neuropathy    SHERRY (obstructive sleep apnea)-severe (AHI 35)    zEncounter for counseling    Anemia of chronic disease    Osteoporosis    Cornea guttata, ou    Conjunctival concretions    Stenosis, spinal, lumbar    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    MGD (meibomian gland dysfunction)    Prediabetes    Chronic bilateral low back pain without sciatica    Allergic state, subsequent encounter    Anxiety    DDD (degenerative disc disease), lumbar    Chronic right shoulder pain    Moderate episode of recurrent major depressive disorder (H)    Rheumatic mitral stenosis    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    History of bisphosphonate therapy    Morbid obesity (H)    Immunocompromised (H24)    Thoracic spine pain    Other closed intra-articular fracture of distal end of left radius, initial encounter    Closed fracture of shaft of left ulna, unspecified fracture morphology, initial encounter    Closed fracture of olecranon process of right ulna with routine healing              Physical Examination:   BP (!) 142/83   Pulse 68   Ht 1.664 m (5' 5.5\")   Wt 94.4 kg (208 lb 3.2 oz)   SpO2 95%   BMI 34.12 kg/m    General: " Pleasant. Cooperative. In no apparent distress.  Pulmonary: Able to speak in full sentences easily. No cough or wheeze.   Neurologic: Alert, oriented x3.  Psychiatric: Mood euthymic. Affect congruent with full range and intensity.     ASSESSMENT/PLAN:  Obstructive sleep apnea: Pt reports adequate compliance with CPAP and based on compliance measures, SHERRY is adequately controlled with CPAP at the current settings.    DME orders were generated for revision of the pressure settings on the auto CPAP to range 11 to 16 cm water  based on the reports of air hunger and compliance download and for renewal of CPAP supplies.  Recommended to continue using the CPAP regularly during sleep and to use the device for the entire duration of sleep to derive maximum benefit.  Patient was instructed to get the supplies for the CPAP replaced regularly.   Patient instructed to remember to bring PAP with her if hospitalized and if anticipating procedure that requires sedation/surgery to be sure to discuss with the provider/surgeon that she has sleep apnea and uses PAP therapy.    Rheumatoid Arthritis: Patient reports arthritic pain mostly in hips and low back affects sleep. Recommended to continue follow up with Rheumatology.    Anxiety and depression: Recommended to continue follow up with her primary care provider and psychologist for optimizing the management of anxiety and depression.     Non-restorative and fatigue that she reports are multifactorial-rheumatoid arthritis, medications(eg:methotrxate), depression, some nights of not using the CPAP for the entire duration of sleep duration.  Ferritin level in June 2023 was 22 ng/ml.  Recommended obtaining fasting serum ferritin, serum iron, TIBC and percent iron saturation to check for iron deficiency.  Results will be communicated with the patient via MyChart.    Obesity: We discussed weight management with healthy diet, and exercise.     Patient was strongly advised to avoid driving,  "operating any heavy machinery or other hazardous situations while drowsy or sleepy.  Patient was counseled on the importance of driving while alert, to pull over if drowsy, or nap before getting into the vehicle if sleepy.      Follow-up with sleep clinic in 1 year or sooner if any concerns.     The above note was dictated using voice recognition software. Although reviewed after completion, some word and grammatical error may remain . Please contact the author for any clarifications.      \" Total time spent was 45 minutes  for this appointment on this date of service which include time spent before, during and after the visit for chart review, patient care, counseling and coordination of care. Including documentation\"      Deuce Soto MD  [facility]  [address] , [place] [address]      "

## 2024-02-01 ENCOUNTER — THERAPY VISIT (OUTPATIENT)
Dept: PHYSICAL THERAPY | Facility: CLINIC | Age: 84
End: 2024-02-01
Attending: STUDENT IN AN ORGANIZED HEALTH CARE EDUCATION/TRAINING PROGRAM
Payer: COMMERCIAL

## 2024-02-01 DIAGNOSIS — H81.22 VESTIBULAR NEURITIS, LEFT: Primary | ICD-10-CM

## 2024-02-01 PROCEDURE — 97112 NEUROMUSCULAR REEDUCATION: CPT | Mod: GP | Performed by: PHYSICAL THERAPIST

## 2024-02-02 ENCOUNTER — PATIENT OUTREACH (OUTPATIENT)
Dept: OTOLARYNGOLOGY | Facility: CLINIC | Age: 84
End: 2024-02-02
Payer: COMMERCIAL

## 2024-02-02 NOTE — TELEPHONE ENCOUNTER
Noted to miss pts call on caller ID, returned call and left vm    LATRICE Roldan RN 2/2/2024 10:42 AM

## 2024-02-02 NOTE — PROGRESS NOTES
PL called given patient not hearing back from Dr. Espinosa's office and being upset. Advised PL that we don't work with Dr. Espinosa's office unfortunately so can't advise. PL understood and will direct patient to correct clinic. Deya Berry RN on 2/2/2024 at 11:19 AM

## 2024-02-05 NOTE — TELEPHONE ENCOUNTER
Called patient and there was no answer. Left message for patient to call.     Carmen YEBOAH RN, [facility] 02/05/24 8:24 AM

## 2024-02-05 NOTE — TELEPHONE ENCOUNTER
Communicating with patient through mychart encounter.     Carmen YEBOAH RN, [facility] 02/05/24 1:42 PM

## 2024-02-06 ENCOUNTER — ANCILLARY PROCEDURE (OUTPATIENT)
Dept: BONE DENSITY | Facility: CLINIC | Age: 84
End: 2024-02-06
Attending: INTERNAL MEDICINE
Payer: COMMERCIAL

## 2024-02-06 ENCOUNTER — OFFICE VISIT (OUTPATIENT)
Dept: RHEUMATOLOGY | Facility: CLINIC | Age: 84
End: 2024-02-06
Payer: COMMERCIAL

## 2024-02-06 VITALS
SYSTOLIC BLOOD PRESSURE: 132 MMHG | HEART RATE: 87 BPM | HEIGHT: 65 IN | BODY MASS INDEX: 34.66 KG/M2 | WEIGHT: 208 LBS | DIASTOLIC BLOOD PRESSURE: 68 MMHG

## 2024-02-06 DIAGNOSIS — G89.29 CHRONIC MIDLINE LOW BACK PAIN WITH LEFT-SIDED SCIATICA: ICD-10-CM

## 2024-02-06 DIAGNOSIS — M81.8 IDIOPATHIC OSTEOPOROSIS: ICD-10-CM

## 2024-02-06 DIAGNOSIS — Z92.29 HISTORY OF BISPHOSPHONATE THERAPY: ICD-10-CM

## 2024-02-06 DIAGNOSIS — M81.0 OSTEOPOROSIS, UNSPECIFIED OSTEOPOROSIS TYPE, UNSPECIFIED PATHOLOGICAL FRACTURE PRESENCE: Chronic | ICD-10-CM

## 2024-02-06 DIAGNOSIS — M54.42 CHRONIC MIDLINE LOW BACK PAIN WITH LEFT-SIDED SCIATICA: ICD-10-CM

## 2024-02-06 DIAGNOSIS — M05.79 RHEUMATOID ARTHRITIS INVOLVING MULTIPLE SITES WITH POSITIVE RHEUMATOID FACTOR (H): Primary | ICD-10-CM

## 2024-02-06 DIAGNOSIS — M81.0 OSTEOPOROSIS, UNSPECIFIED OSTEOPOROSIS TYPE, UNSPECIFIED PATHOLOGICAL FRACTURE PRESENCE: ICD-10-CM

## 2024-02-06 DIAGNOSIS — Z79.899 HIGH RISK MEDICATION USE: ICD-10-CM

## 2024-02-06 DIAGNOSIS — Z78.0 ASYMPTOMATIC POSTMENOPAUSAL STATUS: ICD-10-CM

## 2024-02-06 LAB
ALBUMIN SERPL BCG-MCNC: 4.5 G/DL (ref 3.5–5.2)
ALP SERPL-CCNC: 40 U/L (ref 40–150)
ALT SERPL W P-5'-P-CCNC: 20 U/L (ref 0–50)
AST SERPL W P-5'-P-CCNC: 26 U/L (ref 0–45)
BASOPHILS # BLD AUTO: 0 10E3/UL (ref 0–0.2)
BASOPHILS NFR BLD AUTO: 0 %
BILIRUB DIRECT SERPL-MCNC: <0.2 MG/DL (ref 0–0.3)
BILIRUB SERPL-MCNC: 0.3 MG/DL
CREAT SERPL-MCNC: 0.55 MG/DL (ref 0.51–0.95)
CRP SERPL-MCNC: <3 MG/L
EGFRCR SERPLBLD CKD-EPI 2021: >90 ML/MIN/1.73M2
EOSINOPHIL # BLD AUTO: 0.2 10E3/UL (ref 0–0.7)
EOSINOPHIL NFR BLD AUTO: 4 %
ERYTHROCYTE [DISTWIDTH] IN BLOOD BY AUTOMATED COUNT: 14.2 % (ref 10–15)
ERYTHROCYTE [SEDIMENTATION RATE] IN BLOOD BY WESTERGREN METHOD: 15 MM/HR (ref 0–30)
HCT VFR BLD AUTO: 37.6 % (ref 35–47)
HGB BLD-MCNC: 12.2 G/DL (ref 11.7–15.7)
IMM GRANULOCYTES # BLD: 0.1 10E3/UL
IMM GRANULOCYTES NFR BLD: 1 %
LYMPHOCYTES # BLD AUTO: 1.1 10E3/UL (ref 0.8–5.3)
LYMPHOCYTES NFR BLD AUTO: 21 %
MCH RBC QN AUTO: 33.4 PG (ref 26.5–33)
MCHC RBC AUTO-ENTMCNC: 32.4 G/DL (ref 31.5–36.5)
MCV RBC AUTO: 103 FL (ref 78–100)
MONOCYTES # BLD AUTO: 0.8 10E3/UL (ref 0–1.3)
MONOCYTES NFR BLD AUTO: 15 %
NEUTROPHILS # BLD AUTO: 3.2 10E3/UL (ref 1.6–8.3)
NEUTROPHILS NFR BLD AUTO: 60 %
PLATELET # BLD AUTO: 395 10E3/UL (ref 150–450)
PROT SERPL-MCNC: 7.6 G/DL (ref 6.4–8.3)
RBC # BLD AUTO: 3.65 10E6/UL (ref 3.8–5.2)
WBC # BLD AUTO: 5.3 10E3/UL (ref 4–11)

## 2024-02-06 PROCEDURE — 36415 COLL VENOUS BLD VENIPUNCTURE: CPT | Performed by: INTERNAL MEDICINE

## 2024-02-06 PROCEDURE — 86140 C-REACTIVE PROTEIN: CPT | Performed by: INTERNAL MEDICINE

## 2024-02-06 PROCEDURE — 99214 OFFICE O/P EST MOD 30 MIN: CPT | Performed by: INTERNAL MEDICINE

## 2024-02-06 PROCEDURE — 86481 TB AG RESPONSE T-CELL SUSP: CPT | Performed by: INTERNAL MEDICINE

## 2024-02-06 PROCEDURE — 85652 RBC SED RATE AUTOMATED: CPT | Performed by: INTERNAL MEDICINE

## 2024-02-06 PROCEDURE — 77080 DXA BONE DENSITY AXIAL: CPT | Mod: TC | Performed by: PHYSICIAN ASSISTANT

## 2024-02-06 PROCEDURE — G2211 COMPLEX E/M VISIT ADD ON: HCPCS | Performed by: INTERNAL MEDICINE

## 2024-02-06 PROCEDURE — 80076 HEPATIC FUNCTION PANEL: CPT | Performed by: INTERNAL MEDICINE

## 2024-02-06 PROCEDURE — 77081 DXA BONE DENSITY APPENDICULR: CPT | Mod: TC | Performed by: PHYSICIAN ASSISTANT

## 2024-02-06 PROCEDURE — 85025 COMPLETE CBC W/AUTO DIFF WBC: CPT | Performed by: INTERNAL MEDICINE

## 2024-02-06 PROCEDURE — 82565 ASSAY OF CREATININE: CPT | Performed by: INTERNAL MEDICINE

## 2024-02-06 RX ORDER — MEPERIDINE HYDROCHLORIDE 25 MG/ML
25 INJECTION INTRAMUSCULAR; INTRAVENOUS; SUBCUTANEOUS EVERY 30 MIN PRN
Status: CANCELLED | OUTPATIENT
Start: 2024-03-21

## 2024-02-06 RX ORDER — ALBUTEROL SULFATE 90 UG/1
1-2 AEROSOL, METERED RESPIRATORY (INHALATION)
Status: CANCELLED
Start: 2024-03-21

## 2024-02-06 RX ORDER — METHYLPREDNISOLONE SODIUM SUCCINATE 125 MG/2ML
125 INJECTION, POWDER, LYOPHILIZED, FOR SOLUTION INTRAMUSCULAR; INTRAVENOUS
Status: CANCELLED
Start: 2024-03-21

## 2024-02-06 RX ORDER — ZOLEDRONIC ACID 5 MG/100ML
5 INJECTION, SOLUTION INTRAVENOUS ONCE
Status: CANCELLED
Start: 2024-03-21

## 2024-02-06 RX ORDER — HEPARIN SODIUM (PORCINE) LOCK FLUSH IV SOLN 100 UNIT/ML 100 UNIT/ML
5 SOLUTION INTRAVENOUS
Status: CANCELLED | OUTPATIENT
Start: 2024-03-21

## 2024-02-06 RX ORDER — LEUCOVORIN CALCIUM 5 MG/1
5 TABLET ORAL
Qty: 13 TABLET | Refills: 2 | Status: SHIPPED | OUTPATIENT
Start: 2024-02-06 | End: 2024-09-10

## 2024-02-06 RX ORDER — DIPHENHYDRAMINE HYDROCHLORIDE 50 MG/ML
50 INJECTION INTRAMUSCULAR; INTRAVENOUS
Status: CANCELLED
Start: 2024-03-21

## 2024-02-06 RX ORDER — HEPARIN SODIUM,PORCINE 10 UNIT/ML
5-20 VIAL (ML) INTRAVENOUS DAILY PRN
Status: CANCELLED | OUTPATIENT
Start: 2024-03-21

## 2024-02-06 RX ORDER — EPINEPHRINE 1 MG/ML
0.3 INJECTION, SOLUTION, CONCENTRATE INTRAVENOUS EVERY 5 MIN PRN
Status: CANCELLED | OUTPATIENT
Start: 2024-03-21

## 2024-02-06 RX ORDER — ALBUTEROL SULFATE 0.83 MG/ML
2.5 SOLUTION RESPIRATORY (INHALATION)
Status: CANCELLED | OUTPATIENT
Start: 2024-03-21

## 2024-02-06 NOTE — NURSING NOTE
RAPID3 (0-30) Cumulative Score  13.7          RAPID3 Weighted Score (divide #4 by 3 and that is the weighted score)  4.5

## 2024-02-06 NOTE — PROGRESS NOTES
Rheumatology Clinic Visit      Ginger Marshall MRN# [id number]   YOB: 1940 Age: 83 year old      Date of visit: 2/06/24   PCP: Dr. Duy Leyva    Chief Complaint   Patient presents with:  RECHECK  Flare: Low back, stiffness increased. No redness increased pain    Assessment and Plan     1. Seropositive Erosive Rheumatoid Arthritis ( [2009], CCP >100 [2009]; hx of rheumatoid nodule by 6/14/2011 pathology): Previously failed remicade (staph infection during therapy, but was immediately after a joint injection), orencia (migraines), HCQ (ineffective), Xeljanz (partially effective).  Sulfa allergy.  Currently on methotrexate 25 mg SQ once weekly (dose reduction in the past resulted in more symptoms), leucovorin 5 mg weekly (resolved nasal sore that didn't improve with higher daily folic acid doses), and Rinvoq 15mg daily.  RA well controlled since changing to Rinvoq.  Chronic illness, stable.    - Continue methotrexate 25mg SQ once weekly (Rasuvo)  - Continue leucovorin 5 mg every 7 days, 24 hours after MTX dose.   - Continue Rinvoq 15 mg daily  - Labs today: CBC, Creatinine, Hepatic Panel, ESR, CRP, QuantiFERON-TB gold plus  - Labs in 3 months: CBC, Creatinine, Hepatic Panel, ESR, CRP    High risk medication requiring intensive toxicity monitoring at least quarterly    2. 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  Doing okay at this time.     3. Right hip pain: Status post WALTER twice in the past. Followed by [facility] orth[facility]ics as needed.  Symptoms are degenerative in nature.  Reportedly doing well at this time per patient    4. 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 Michele who is now at [facility].  Has been seen at [facility] and [place] pain clinic.  She previously reported that a TENS unit was helpful at one point, then not helpful.   She does not want  additional lumbar spine injections or back surgery.  However, she says now that she is reconsidering and would like to see the pain clinic for evaluation.  Alternatively she could see her spine surgeon but she does not wish to do so at this time.    5.  Thoracic back pain: Degenerative in nature.  And degenerative changes seen on 2021 chest CT.  Continue physical therapy exercises at home, as these are helpful.    6. Osteoporosis: was previously managed by endocrinology and she received reclast once in 2013, 2014, 2016. 12/12/2018 DEXA ordered by Dr. Vázquez showed osteoporosis.  Repeat DEXA on 2/2/2022 showed osteoporosis; no significant change of the hips; forearm osteoporosis but no previous evaluation of the forearm for comparison.  Reclast was restarted after sufficient drug holiday, with the first dose on 3/18/2021; again received in 3/21/2022 and 3/21/2023.  DEXA being performed later today.  Chronic illness, stable.      - Reclast once yearly; next due on 3/21/2024  - Continue vitamin D 1000 IU daily  - Continue calcium 1200mg daily from supplement and dietary sources  - Plan to recheck DEXA in February 2024; this has been scheduled    7. Left 1st toe pain, history: 2 episodes of sudden onset left toe pain followed by diffuse left foot pain that made ambulation difficult.  Also with nodules over both Achilles tendons and the right elbow that are either rheumatoid nodules or tophi.  She reports having history of gout decades ago.  Denies ever being on colchicine or allopurinol.  Discussed colchicine to use if suspected gout flare occurs.  No flares since last seen so has not used colchicine.  - Colchicine: (MITIGARE) 0.6 MG capsule; Take 2 capsules (1.2 mg) by mouth once for 1 dose at the onset of a gout flare, followed by 1 capsule (0.6mg) 1 hour later.      8. Chronic pain syndrome: Documented here for historical significance only.   Management per pain clinic and/or PCP    9.  Vaccinations: Vaccinations  reviewed with Ms. Marshall.  Risks and benefits of vaccinations were discussed.    - Influenza: encouraged yearly vaccination, and to hold methotrexate for 1-2 weeks afterward  - [id number]: up to date  - [id number]: up to date  - Shingrix: Up to date   - COVID-19: Advised keeping updated, and to hold methotrexate and Rinvoq for 1-2 weeks afterward  - RSV: Advised vaccination    10.  Nasal sores: Only occurs between December-March each year.  Reports using a humidifier on her CPAP.  Less likely methotrexate related considering seasonal nature of symptoms.  She is going to see ENT.    Total minutes spent in evaluation with patient, documentation, , and review of pertinent studies and chart notes: 22  The longitudinal plan of care for the rheumatology problem(s) were addressed during this visit.  Due to added complexity of care, we will continue to support the patient and the subsequent management of this condition with ongoing continuity of care.        Ms. Marshall verbalized agreement with and understanding of the rational for the diagnosis and treatment plan.  All questions were answered to best of my ability and the patient's satisfaction. Ms. Marshall was advised to contact the clinic with any questions that may arise after the clinic visit.      Thank you for involving me in the care of the patient    Return to clinic: February.  She will be coming to clinic for her DEXA scan and would like to have her rheumatology follow-up appointment on the same day.  This has been arranged.    VANE Marshall is a 83 year old female with a history of multiple foot surgeries, hand tendon transfers, MCP replacements (most recent being in August 2015), bilateral TKA, right WALTER, left distal radius fracture history, gout, s/p lumbar fusion, obstructive sleep apnea and uses a CPAP, osteoporosis and seropositive (RF+, CCP+) erosive rheumatoid arthritis who presents for follow-up of rheumatoid arthritis.      Today,  2/6/2024: RA controlled.  Nasal sores each year between December-March.  Uses a humidifier on her CPAP.  Has not noticed any change with methotrexate use.  No oral sores.  Chronic low back pain that radiates to the left leg; she would like to be evaluated in the pain clinic; she does not want to return to the spine surgeon because she does not want surgery.  He does not want to try reducing methotrexate because she recalls having worsening joint symptoms with dose reduction in the past.    Denies fevers, chills, nausea, vomiting, constipation, diarrhea. No abdominal pain. No chest pain/pressure, palpitations, or shortness of breath.     Tobacco: quit in 1999  EtOH: 1 glass of wine every month at most  Drugs: None  Occupation: , retired     ROS   12 point review of system was completed and negative except as noted in the HPI     Active Problem List     Patient Active Problem List   Diagnosis    Rheumatoid arthritis involving right hand with positive rheumatoid factor (H)    History of colonic polyps    Multinodular goiter    Pulmonary nodule    PSEUDOPHAKIA OU    PVD (POSTERIOR VITREOUS DETACHMENT) OU    PXF (PSEUDOEXFOLIATION OF LENS CAPSULE) OD    GERD (gastroesophageal reflux disease)    Hyperlipidemia LDL goal <100    Advance Care Planning    Neuropathy    SHERRY (obstructive sleep apnea)-severe (AHI 35)    zEncounter for counseling    Anemia of chronic disease    Osteoporosis    Cornea guttata, ou    Conjunctival concretions    Stenosis, spinal, lumbar    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    MGD (meibomian gland dysfunction)    Prediabetes    Chronic bilateral low back pain without sciatica    Allergic state, subsequent encounter    Anxiety    DDD (degenerative disc disease), lumbar    Chronic right shoulder pain    Moderate episode of recurrent major depressive disorder (H)    Rheumatic mitral stenosis    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     History of bisphosphonate therapy    Morbid obesity (H)    Immunocompromised (H24)    Thoracic spine pain    Other closed intra-articular fracture of distal end of left radius, initial encounter    Closed fracture of shaft of left ulna, unspecified fracture morphology, initial encounter    Closed fracture of olecranon process of right ulna with routine healing     Past Medical History     Past Medical History:   Diagnosis Date    Acute posthemorrhagic anemia 10/13/2012    Closed fracture of multiple ribs of left side, initial encounter 2019    COPD (chronic obstructive pulmonary disease) (H)     no longer occurring    Ex-smoker 1999    Gastroenteritis 2020    Gastroenteritis with norovirus    Herniated nucleus pulposus, L3-4 3/1/2017    Hip joint replacement by other means 07/10/2008    History of blood transfusion     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    History of total knee replacement 2009    Menopause 1989    late 40's    Migraine 2014    resolved    Multinodular goiter     Other chronic pain     joints    Pelvic fracture (H) 2014    Rheumatic mitral stenosis     Rheumatoid arteritis (H)     S/P lumbar fusion 2017    Sleep apnea     Vitamin B12 deficiency      Past Surgical History     Past Surgical History:   Procedure Laterality Date    ABDOMEN SURGERY      c sections    ARTHROSCOPY KNEE Left     ARTHROSCOPY KNEE RT/LT  2006    left    BACK SURGERY  2013    disc    BIOPSY BREAST      BREAST SURGERY      lumpectomy 's    BREAST SURGERY      lumpectomy    CATARACT EXTRACTION Bilateral     CATARACT IOL, RT/LT Bilateral 2010 aproximately     SECTION  1966     SECTION  1972    COLONOSCOPY  2009,    COLONOSCOPY      FINGER SURGERY Right 2019    Procedure: DISTAL ULNA RESECTION, RIGHT MIDDLE SILASTIC METACARPALPHALANGEAL EXCHANGE AND RIGHT ELBOW NODULE EXCISION.;  Surgeon: Hilario Redmond MD;  Location: [facility]  [facility];  Service: Orthopedics    FOOT SURGERY Left     GYN SURGERY  [age]    HAND SURGERY Right     HAND SURGERY Right     HC ESOPH/GAS REFLUX TEST W NASAL IMPED >1 HR  02/01/2012    Procedure:ESOPHAGEAL IMPEDENCE FUNCTION TEST WITH 24 HOUR PH GREATER THAN 1 HOUR; Surgeon:KAYKAY JULIO; Location:[facility]    IR LUMBAR EPIDURAL STEROID INJECTION      IR TRANSLAMINAR EPIDURAL LUMBAR INJ INCL IMAGING  05/02/2012    LESI L5-S1 at [facility]    LUMBAR FUSION      OPEN REDUCTION INTERNAL FIXATION ELBOW Right 2/12/2023    Procedure: OPEN REDUCTION INTERNAL FIXATION, RIGHT OLECRANON FRACTURE;  Surgeon: Pili Brock MD;  Location:  OR    OPEN REDUCTION INTERNAL FIXATION WRIST Left 2/12/2023    Procedure: OPEN REDUCTION INTERNAL FIXATION, LEFT DISTAL RADIUS FRACTURE;  Surgeon: Pili Brock MD;  Location:  OR    OPTICAL TRACKING SYSTEM FUSION POSTERIOR SPINE LUMBAR N/A 04/03/2017    Procedure: OPTICAL TRACKING SYSTEM FUSION SPINE POSTERIOR LUMBAR ONE LEVEL;  Surgeon: Anthony Michele MD;  Location:  OR    ORTHOPEDIC SURGERY  1991    left foot surgery    ORTHOPEDIC SURGERY  1995    R MCP surgery    ORTHOPEDIC SURGERY  2007    right knee total replacement    TOTAL HIP ARTHROPLASTY Right     TOTAL KNEE ARTHROPLASTY Left     TOTAL KNEE ARTHROPLASTY Right     ZZC ANESTH,TOTAL HIP ARTHROPLASTY  2010    Rt hip    ZZC HAND/FINGER SURGERY UNLISTED  11/2005    right hand    ZZC TOTAL KNEE ARTHROPLASTY  2006    left     Allergy     Allergies   Allergen Reactions    Abatacept Other (See Comments)     Severe headaches  Migraine    Celebrex [Roche-2 Inhibitors]      Ineffective    Celecoxib Unknown and Nausea    Levaquin [Levofloxacin] Fatigue     Severe body pain    Orencia [Abatacept]      Headache    Septra [Bactrim]     Sulfa Antibiotics Nausea and Vomiting     \"deathly ill\"  Allergic to everything with Sulfa in it.    Sulfamethoxazole-Trimethoprim Nausea and Nausea and Vomiting    Tramadol Other " "(See Comments)     Headache  Migraine headache    Valdecoxib Unknown and Nausea     Made me \"very very ill\", might of been \"cramping\"    Adhesive Tape Rash     Plastic tape  Plastic tapes    Antihistamines, Chlorpheniramine-Type  [Antihistamines, Chlorpheniramine-Type] Anxiety and Other (See Comments)     Current Medication List     Current Outpatient Medications   Medication Sig    acetaminophen (TYLENOL) 325 MG tablet Take 2 tablets (650 mg) by mouth every 6 hours as needed for mild pain Max tylenol 3000 mg in 24 hours    albuterol (PROAIR HFA/PROVENTIL HFA/VENTOLIN HFA) 108 (90 Base) MCG/ACT inhaler Inhale 2 puffs into the lungs every 6 hours as needed for shortness of breath, wheezing or cough    atorvastatin (LIPITOR) 40 MG tablet Take 1 tablet (40 mg) by mouth daily    busPIRone (BUSPAR) 10 MG tablet Take 1 tablet (10 mg) by mouth 2 times daily    CALCIUM CITRATE PO Take 300 mg by mouth daily Take with meal that contains least amount of calcium    carboxymethylcellulose PF (REFRESH PLUS) 0.5 % ophthalmic solution Place 1 drop into both eyes 2 times daily as needed for dry eyes    Cholecalciferol (VITAMIN D-3 PO) Take 2,000 Units by mouth daily    desvenlafaxine (PRISTIQ) 100 MG 24 hr tablet TAKE ONE TABLET BY MOUTH DAILY    dextromethorphan (DELSYM) 30 MG/5ML liquid Take 60 mg by mouth 2 times daily    Ferrous Gluconate 324 (37.5 Fe) MG TABS Take 1 tablet by mouth daily Started 6/2023    fluticasone (FLONASE) 50 MCG/ACT nasal spray Spray 1 spray into both nostrils daily    guaiFENesin (MUCINEX) 600 MG 12 hr tablet Take 1 tablet (600 mg) by mouth 2 times daily    hydrocortisone 2.5 % cream Apply topically 2 times daily For up to two weeks    ipratropium (ATROVENT) 0.06 % nasal spray 2 sprays in each nostril, 2-3 times per day as needed for rhinitis    leucovorin (WELLCOVORIN) 5 MG tablet Take 1 tablet (5 mg) by mouth every 7 days . Take 24 hours after taking Methotrexate each week.    loratadine (CLARITIN) 10 " MG tablet Take 1 tablet (10 mg) by mouth 2 times daily    Methotrexate, PF, (RASUVO) 25 MG/0.5ML autoinjector Inject 0.5 mLs (25 mg) Subcutaneous every 7 days . Hold for signs of infection, and seek medical attention. Take every Thursday.    naproxen sodium (ANAPROX) 220 MG tablet Take 2 tablets (440 mg) by mouth daily as needed for moderate pain (4-6) (less than monthly use currently) (Patient taking differently: Take 440 mg by mouth daily as needed for moderate pain (using a couple times a month))    olopatadine (PATADAY) 0.2 % ophthalmic solution Place 1 drop into both eyes daily as needed Uses mostly in Spring    pantoprazole (PROTONIX) 40 MG EC tablet Take 1 tablet (40 mg) by mouth every morning (before breakfast)    predniSONE (DELTASONE) 20 MG tablet Take 3 tabs by mouth daily x 3 days, then 2 tabs daily x 3 days, then 1 tab daily x 3 days, then 1/2 tab daily x 3 days.    sucralfate (CARAFATE) 1 GM tablet Take 1 tablet (1 g) by mouth 4 times daily as needed for nausea (reflux) TAKE ONE TABLET BY MOUTH FOUR TIMES A DAY AS NEEDED HEARTBURN    upadacitinib ER (RINVOQ) 15 MG tablet Take 1 tablet (15 mg) by mouth daily . Hold for any infection and seek medical attention.    valACYclovir (VALTREX) 500 MG tablet Take 1 tablet (500 mg) by mouth 2 times daily    vitamin C (ASCORBIC ACID) 1000 MG TABS Take 1 tablet (1,000 mg) by mouth daily    zinc gluconate 50 MG tablet Take 1 tablet (50 mg) by mouth daily    zoledronic Acid (RECLAST) 5 MG/100ML SOLN infusion Inject 5 mg into the vein once Every year (next dose 2023)     No current facility-administered medications for this visit.     Social History   See HPI    Family History     Family History   Problem Relation Age of Onset    Arthritis Mother     Alzheimer Disease Mother     Hyperlipidemia Mother     Osteoporosis Mother     Heart Disease Father         MI ( from this)    Alcohol/Drug Father     Arthritis Sister     Hypertension Sister     Other Cancer  "Sister         Luekemia    Cancer Son     Diabetes Son         type 1    Neurologic Disorder Sister         Schizophrenic    Hypertension Sister     Hyperlipidemia Sister     Mental Illness Sister     Diabetes Son     Other Cancer Son     Substance Abuse Son     Glaucoma Daughter     Diabetes Other         type 2    Hyperlipidemia Other      No change in family history since the previous clinic visit.    Physical Exam     Temp Readings from Last 3 Encounters:   01/17/24 97.7  F (36.5  C) (Tympanic)   12/21/23 98  F (36.7  C) (Oral)   11/28/23 97.7  F (36.5  C) (Oral)     BP Readings from Last 5 Encounters:   02/06/24 (!) 145/70   01/29/24 (!) 142/83   01/17/24 124/72   12/21/23 125/77   11/28/23 122/73     Pulse Readings from Last 1 Encounters:   02/06/24 87     Resp Readings from Last 1 Encounters:   01/17/24 16     Estimated body mass index is 34.61 kg/m  as calculated from the following:    Height as of this encounter: 1.651 m (5' 5\").    Weight as of this encounter: 94.3 kg (208 lb).      GEN: NAD.   HEENT:  Anicteric, noninjected sclera. No obvious external lesions of the ear and nose. Hearing intact.  CV: S1, S2. RRR. No m/r/g  PULM: No increased work of breathing. CTA bilaterally   MSK: MCPs, PIPs, DIPs without swelling or tenderness to palpation.  Heberden's and Lore's nodes present.  Wrists without swelling or tenderness to palpation.  Elbows and shoulders without swelling or tenderness to palpation.  Knees with medial joint line tenderness but no effusion, increased warmth, or overlying erythema.  Ankles and MTPs without swelling or tenderness to palpation.  Thickened callus on the plantar aspect of the foot under the first MTP.  SKIN: No rash or jaundice seen  PSYCH: Alert. Appropriate.      Labs     CBC  Recent Labs   Lab Test 12/05/23  1413 08/22/23  1005 06/20/23  1329 08/18/21  1054 05/10/21  1023 02/16/21  1019 11/16/20  1028   WBC 4.3 4.2 4.8   < > 5.0 5.7 5.7   RBC 3.53* 3.85 4.03   < > 3.39* " 3.81 3.91   HGB 11.9 11.4* 11.0*   < > 11.3* 12.5 12.0   HCT 36.0 35.5 36.4   < > 35.6 38.9 37.8   * 92 90   < > 105* 102* 97   RDW 14.7 19.0* 17.7*   < > 14.3 15.2* 16.1*    335 397   < > 333 335 361   MCH 33.7* 29.6 27.3   < > 33.3* 32.8 30.7   MCHC 33.1 32.1 30.2*   < > 31.7 32.1 31.7   NEUTROPHIL 52 57 58   < > 57.7 65.8 60.5   LYMPH 28 25 28   < > 18.5 17.0 20.4   MONOCYTE 17 14 11   < > 17.3 12.1 12.8   EOSINOPHIL 2 3 3   < > 6.3 4.9 6.1   BASOPHIL 0 0 0   < > 0.2 0.2 0.2   ANEU  --   --   --   --  2.9 3.8 3.5   ALYM  --   --   --   --  0.9 1.0 1.2   MARYURI  --   --   --   --  0.9 0.7 0.7   AEOS  --   --   --   --  0.3 0.3 0.4   ABAS  --   --   --   --  0.0 0.0 0.0   ANEUTAUTO 2.2 2.4 2.7   < >  --   --   --    ALYMPAUTO 1.2 1.0 1.4   < >  --   --   --    AMONOAUTO 0.7 0.6 0.5   < >  --   --   --    AEOSAUTO 0.1 0.1 0.2   < >  --   --   --    ABSBASO 0.0 0.0 0.0   < >  --   --   --     < > = values in this interval not displayed.     CMP  Recent Labs   Lab Test 12/05/23  1413 08/22/23  1005 06/20/23  1329 04/25/23  1008 02/22/23  1018 02/12/23  1633 08/18/21  1054 05/10/21  1023 03/18/21  1350 02/16/21  1019 12/18/20  0923 11/16/20  1028   NA  --   --  142  --  142 136   < >  --   --   --   --   --    POTASSIUM  --   --  3.5  --  4.0 3.8   < >  --   --   --   --   --    CHLORIDE  --   --  105  --  103 102   < >  --   --   --   --   --    CO2  --   --  24  --  25 25   < >  --   --   --   --   --    ANIONGAP  --   --  13  --  14 9   < >  --   --   --   --   --    GLC  --   --  123*  --  104* 159*   < >  --   --   --   --   --    BUN  --   --  9.6  --  11.0 10.3   < >  --   --   --   --   --    CR 0.55 0.63 0.60 0.60 0.52 0.52   < > 0.59  --  0.62  --  0.60   GFRESTIMATED >90 89 90 90 >90 >90   < > 87  --  86  --  87   GFRESTBLACK  --   --   --   --   --   --   --  >90  --  >90  --  >90   BRANDEE  --   --  9.8 9.8 9.4 8.6*   < >  --    < >  --    < >  --    BILITOTAL 0.3 0.4 0.3 0.2  --   --    < > 0.3   --  0.4  --  0.4   ALBUMIN 4.4 4.6 4.6 4.5  --   --    < > 3.6  --  4.2  --  4.1   PROTTOTAL 7.5 7.8 8.1 7.7  --   --    < > 7.0  --  8.0  --  7.6   ALKPHOS 47 37 50 53  --   --    < > 45  --  44  --  47   AST 37 30 29 29  --   --    < > 22  --  21  --  25   ALT 29 21 21 22  --   --    < > 36  --  34  --  36    < > = values in this interval not displayed.     Calcium/VitaminD  Recent Labs   Lab Test 06/20/23  1329 06/05/23  0941 04/25/23  1008 02/22/23  1018 02/11/23  0819 01/16/23  1122   BRANDEE 9.8  --  9.8 9.4   < > 9.2   VITDT  --  60 66  --   --  95*    < > = values in this interval not displayed.     ESR/CRP  Recent Labs   Lab Test 08/22/23  1005 04/25/23  1008 01/16/23  1122 07/12/22  1112 04/29/22  1000 02/04/22  0832 12/08/21  1053   SED 16 30 27   < > 15 14 16   CRP  --   --   --   --  <2.9 <2.9 <2.9   CRPI <3.00 <3.00 <3.00   < >  --   --   --     < > = values in this interval not displayed.       Hepatitis B  Recent Labs   Lab Test 10/20/22  1256   HBCAB Nonreactive   HEPBANG Nonreactive     Hepatitis C  Recent Labs   Lab Test 10/20/22  1256   HCVAB Nonreactive     Tuberculosis Screening  Recent Labs   Lab Test 02/16/23  0000 10/20/22  1256 08/18/21  1054 05/07/18  1033   TBRES Indeterminate* Negative Negative  --    TBRSLT  --   --   --  Negative   TBAGN  --   --   --  0.00       Immunization History     Immunization History   Administered Date(s) Administered    COVID-19 MONOVALENT 12+ (Pfizer) 02/09/2021, 03/03/2021    Flu, Unspecified 10/20/2013    Influenza (High Dose) 3 valent vaccine 10/28/2013, 09/23/2014, 11/11/2015, 09/23/2016, 09/24/2019    Influenza (IIV3) PF 10/12/1999, 10/26/2001, 10/19/2002, 10/30/2003, 10/28/2004, 10/21/2005, 10/26/2007, 10/21/2009, 10/05/2011, 10/13/2012    Influenza Vaccine (Flucelvax Quadrivalent) 09/28/2017, 09/27/2018    Influenza Vaccine 65+ (Fluzone HD) 10/07/2021, 11/16/2022, 12/11/2023    Influenza Vaccine >6 months,quad, PF 09/24/2020    Influenza Vaccine, 6+MO IM  (QUADRIVALENT W/PRESERVATIVES) 11/16/2022    Influenza, seasonal, injectable, PF 10/24/2006, 11/14/2007, 10/22/2008, 10/21/2009    Mantoux Tuberculin Skin Test 11/03/2006    Pneumo Conj 13-V (2010&after) 07/29/2015    Pneumococcal 23 valent 06/26/2000, 10/26/2001, 06/01/2007, 06/02/2010    TD,PF 7+ (Tenivac) 12/10/2008, 07/20/2009    Tdap (Adult) Unspecified Formulation 12/12/2018    Zoster recombinant adjuvanted (SHINGRIX) 12/12/2018, 02/14/2019          Chart documentation done in part with Dragon Voice recognition Software. Although reviewed after completion, some word and grammatical error may remain.    Nico Byers MD

## 2024-02-06 NOTE — PATIENT INSTRUCTIONS
RHEUMATOLOGY    [facility]  [address]  [place] [address]    Phone number: [phone]  Fax number: [phone]    If you need a medication refill, please contact us as you may need lab work and/or a follow up visit prior to your refill.      Thank you for choosing [facility]!                            
03-Apr-2021

## 2024-02-07 LAB
QUANTIFERON MITOGEN: 3.43 IU/ML
QUANTIFERON NIL TUBE: 0.01 IU/ML
QUANTIFERON TB1 TUBE: 0.01 IU/ML
QUANTIFERON TB2 TUBE: 0.01

## 2024-02-08 ENCOUNTER — THERAPY VISIT (OUTPATIENT)
Dept: PHYSICAL THERAPY | Facility: CLINIC | Age: 84
End: 2024-02-08
Attending: STUDENT IN AN ORGANIZED HEALTH CARE EDUCATION/TRAINING PROGRAM
Payer: COMMERCIAL

## 2024-02-08 DIAGNOSIS — H81.22 VESTIBULAR NEURITIS, LEFT: Primary | ICD-10-CM

## 2024-02-08 LAB
GAMMA INTERFERON BACKGROUND BLD IA-ACNC: 0.01 IU/ML
M TB IFN-G BLD-IMP: NEGATIVE
M TB IFN-G CD4+ BCKGRND COR BLD-ACNC: 3.42 IU/ML
MITOGEN IGNF BCKGRD COR BLD-ACNC: 0 IU/ML
MITOGEN IGNF BCKGRD COR BLD-ACNC: 0 IU/ML

## 2024-02-08 PROCEDURE — 97112 NEUROMUSCULAR REEDUCATION: CPT | Mod: GP | Performed by: PHYSICAL THERAPIST

## 2024-02-08 PROCEDURE — 97116 GAIT TRAINING THERAPY: CPT | Mod: GP | Performed by: PHYSICAL THERAPIST

## 2024-02-09 ENCOUNTER — TRANSFERRED RECORDS (OUTPATIENT)
Dept: HEALTH INFORMATION MANAGEMENT | Facility: CLINIC | Age: 84
End: 2024-02-09
Payer: COMMERCIAL

## 2024-02-15 NOTE — RESULT ENCOUNTER NOTE
Ginger    Recently done endocrinology lab test/ imaging test showed:   Bone mineral density measurements are within normal limits using T score. Please note this is a limited study because the AP spine, right femur and both forearms were not able to be imaged.  Please schedule follow up visit to discuss results in details.    Here is a copy for your records.    Please call endocrinology clinic ( [phone]) if questions.    Annmarie Smith MD  Endocrinology   [facility]  February 15, 2024

## 2024-02-21 ENCOUNTER — VIRTUAL VISIT (OUTPATIENT)
Dept: PHARMACY | Facility: CLINIC | Age: 84
End: 2024-02-21
Payer: COMMERCIAL

## 2024-02-21 DIAGNOSIS — G47.33 OSA (OBSTRUCTIVE SLEEP APNEA): Chronic | ICD-10-CM

## 2024-02-21 DIAGNOSIS — E78.5 HYPERLIPIDEMIA LDL GOAL <100: Chronic | ICD-10-CM

## 2024-02-21 DIAGNOSIS — F41.9 ANXIETY: Chronic | ICD-10-CM

## 2024-02-21 DIAGNOSIS — K21.9 GASTROESOPHAGEAL REFLUX DISEASE, UNSPECIFIED WHETHER ESOPHAGITIS PRESENT: Primary | ICD-10-CM

## 2024-02-21 DIAGNOSIS — F33.1 MODERATE EPISODE OF RECURRENT MAJOR DEPRESSIVE DISORDER (H): ICD-10-CM

## 2024-02-21 DIAGNOSIS — Z78.9 TAKES DIETARY SUPPLEMENTS: ICD-10-CM

## 2024-02-21 DIAGNOSIS — M05.741 RHEUMATOID ARTHRITIS INVOLVING RIGHT HAND WITH POSITIVE RHEUMATOID FACTOR (H): Chronic | ICD-10-CM

## 2024-02-21 DIAGNOSIS — M81.0 OSTEOPOROSIS, UNSPECIFIED OSTEOPOROSIS TYPE, UNSPECIFIED PATHOLOGICAL FRACTURE PRESENCE: ICD-10-CM

## 2024-02-21 PROCEDURE — 99207 PR NO CHARGE LOS: CPT | Mod: 93 | Performed by: PHARMACIST

## 2024-02-21 RX ORDER — ATORVASTATIN CALCIUM 40 MG/1
40 TABLET, FILM COATED ORAL DAILY
Qty: 90 TABLET | Refills: 3 | Status: SHIPPED | OUTPATIENT
Start: 2024-02-21

## 2024-02-21 NOTE — PROGRESS NOTES
Medication Therapy Management (MTM) Encounter    ASSESSMENT:                            Medication Adherence/Access: No issues identified    GERD    Stable. I have asked patients primary care provider Dr. Gordillo in the past about testing for H. Pylori and she does not feel it is warranted at this time. Recommend continuing to monitor. If patient has unexplained iron deficiency anemia, that would be an indication to test for H. Pylori as having this can interfere with the absorption of oral iron.    Hyperlipidemia   Stable. Refilled atorvastatin today as patient has been out for a week.     Supplements   Recommended she discuss with primary care provider if iron is still needed with improvement in iron labs that were check recently.    Depression/Anxiety:  Recommend patient discuss mood with primary care provider at upcoming visit. Desvenlafaxine is at max dose. Could consider switching to sertraline which appears she has tried in the past with success and stopped due to depression being controlled. Encouraged patient to continue regular visits with her therapist.     Sleep Apnea:   Stable    Osteoporosis:   Recommended she make sure to get 1200 mg of calcium daily divided with as much being in her diet as possible. Managed and followed by endocrinology.    Rheumatoid Arthritis:    Stable, managed and followed by rheumatology.    PLAN:                            Talk to Dr. Gordillo and see if she would like you to continue or stop iron based on last labs?  You tried sertraline for depression in the past and you stopped it in 2018 as your depression was controlled. You could consider switching back to this. Talk with Dr. Gordillo when you see her.  Make sure you are getting   CALCIUM    Postmenopausal women not on estrogen: 1200 mg/day     Dietary sources: These also contain vitamin D  Milk                            8 oz            300 mg  Yogurt                          1 cup           400 mg  Hard cheese                      1.5 oz          300 mg  Cottage cheese                  2 cup           300 mg  Orange juice with Calcium       8 oz            300 mg  Low fat dairy sources are recommended     I recommend use of the International Osteoporosis Foundation Calcium Calculator to get an estimate of daily total intake in the diet (www.iofcalciumcalculator).     Vitamin D recommendations: 1000 international units daily    It is best to get your vitamins from your diet and supplement when needed.    Follow-up: Return in about 1 year (around 2/21/2025).    SUBJECTIVE/OBJECTIVE:                          Ginger Marshall is a 83 year old female called for a follow-up visit from 7/31/23.       Reason for visit: follow-up gerd and do comprehensive medication review with first visit of the year.    Allergies/ADRs: Reviewed in chart  Past Medical History: Reviewed in chart  Tobacco: She reports that she quit smoking about 25 years ago. Her smoking use included cigarettes. She has a 41 pack-year smoking history. She has never used smokeless tobacco.  Alcohol: not currently using but will have a drink occasionally    Specialty Providers  Pain Clinic: [facility], Dr. Clark  Rheumatologist: Dr. Jaramillo  ENT: Dr. Jesús Figueroadley  Sleep Medicine: Dr. Soto     Medication Adherence/Access: no issues reported    GERD    Pantoprazole 40 mg once daily     Patient reports no current symptoms.   Patient feels that current regimen is effective.  The patient does not have a history of GI bleed. She does have history of ulcer. She did have upper endoscopy in June 2009 with no findings. Patient unsure if tested for H. Pylori and unable to find in chart. She doesn't notice that certain foods make her reflux worse.         Hyperlipidemia   atorvastatin 40mg daily    Patient reports no significant myalgias or other side effects.       Recent Labs   Lab Test 01/29/24  1031 01/16/23  1122   CHOL 210* 185   HDL 78 74   * 86   TRIG  133 124     Supplements   Vitamin C  Ferrous gluconate 324 mg once daily    No reported issues at this time.        Ferritin   Date Value Ref Range Status   01/29/2024 69 11 - 328 ng/mL Final   06/05/2023 22 11 - 328 ng/mL Final   11/23/2018 47 8 - 252 ng/mL Final   12/14/2017 44 8 - 252 ng/mL Final     Iron   Date Value Ref Range Status   01/29/2024 70 37 - 145 ug/dL Final   06/05/2023 28 (L) 37 - 145 ug/dL Final   11/23/2018 150 35 - 180 ug/dL Final   12/14/2017 73 35 - 180 ug/dL Final     Iron Binding Cap   Date Value Ref Range Status   11/23/2018 351 240 - 430 ug/dL Final   12/14/2017 332 240 - 430 ug/dL Final     Iron Binding Capacity   Date Value Ref Range Status   01/29/2024 360 240 - 430 ug/dL Final   06/05/2023 380 240 - 430 ug/dL Final     Depression/Anxiety:  Desvenlafaxine 100 mg once daily  Buspirone 10 mg twice daily.   Patient reports no current medication side effects.  Current symptoms include: easy to anger.  Patient reports symptoms improved since being on desvenlafaxine but feels she could use more.  Patient reports the following stressors: none  Patient is seeing a therapist, Rocío Mendez once a month.   Patient is not seeing a psychiatrist.  Therapies tried and response: during chart review saw that patient has been on sertraline 25 mg daily in the past and it was stopped in 2018 by patient since well controlled. It does not appear she had side effects from sertraline. She has also tried duloxetine 60 mg twice daily for neuropathy.  It was stopped in 2017 but unsure why.      11/27/2023    12:37 PM 1/16/2024     6:18 PM 1/22/2024     8:44 AM   PHQ-9 SCORE   PHQ-9 Total Score MyChart 6 (Mild depression) 4 (Minimal depression) 5 (Mild depression)   PHQ-9 Total Score 6 4 5         8/5/2022     8:11 AM 11/16/2022     1:33 PM 6/5/2023     2:31 PM   SPRING-7 SCORE   Total Score 5 (mild anxiety) 2 (minimal anxiety)    Total Score 5 2 3     Sleep Apnea:   She uses her cpap with distilled water for  humidity every night. Feels this works well.    Osteoporosis:   calcium 300 mg once daily  Vitamin D 2000 IU daily  Reclast infusion once yearly    Patient is not experiencing side effects. She is following with endocrinology  DEXA History: 2/6/24 - improved T scores from previous  Patient is getting  1 serving(s)/day of calcium in their diet.  Risk factors: post-menopausal  chronic PPI use  Last vitamin D level:  Lab Results   Component Value Date    VITDT 60 06/05/2023    VITDT 66 04/25/2023    VITDT 95 (H) 01/16/2023    VITDT 62 10/13/2021    VITDT 63 08/18/2021     Rheumatoid Arthritis:    Rinvoq 15 mg once daily  Methotrexate 25 mg once weekly  Leucovorin 5 mg once weekly 24 hours after methotrexate  Naproxen 440 mg daily as needed - using a couple times a month      Patient reports no side effects. She is followed and managed by Rheumatologist Dr. Byers.     Today's Vitals: There were no vitals taken for this visit.  ----------------    I spent 40 minutes with this patient today. All changes were made via collaborative practice agreement with Duy Leyva MD. A copy of the visit note was provided to the patient's provider(s).    A summary of these recommendations was sent via [employer].    Alise Harley, MeganD  Medication Therapy Management Pharmacist    Telemedicine Visit Details  Type of service:  Telephone visit  Start Time: 1:21 PM  End Time: 2:01 PM     Medication Therapy Recommendations  Moderate episode of recurrent major depressive disorder (H)    Current Medication: desvenlafaxine (PRISTIQ) 100 MG 24 hr tablet   Rationale: More effective medication available - Ineffective medication - Effectiveness   Recommendation: Change Medication - sertraline 25 MG tablet   Status: Contact Provider - Awaiting Response

## 2024-02-22 NOTE — COMMUNITY RESOURCES LIST (ENGLISH)
02/22/2024    [facility]  N/A  For questions about this resource list or additional care needs, please contact your primary care clinic or care manager.  Phone: [phone]   Email: N/A   Address: [address] [place] [address]   Hours: N/A        Exercise and Recreation       Gym or workout facility  1  [employer] - [place] Distance: 0.51 miles      In-Person   [address] [place] [address]  Language: English  Hours: Mon - Sun Open 24 Hours  Fees: Insurance, Self Pay, Sliding Fee   Phone: (243) [phone] Email: rickey@wmbly Website: [email or web address]     2  [facility] [place] [facility] [employer] Classes Distance: 4.5 miles      In-Person   [address] [place] [address]  Language: English  Hours: Mon - Fri 6:00 AM - 12:30 PM , Mon - Fri 3:00 PM - 8:00 PM , Sat 8:00 AM - 12:00 PM  Fees: Self Pay   Phone: (872) [phone] Website: [email or web address]          Important Numbers & Websites       Emergency Services   911  [facility]   311  Poison Control   (221) 375-4751  Suicide Prevention Lifeline   (951) 425-4467 (TALK)  Child Abuse Hotline   (121) 204-5380 (4-A-Child)  Sexual Assault Hotline   (317) 137-4894 (HOPE)  National Runaway Safeline   (214) 958-6890 (RUNAWAY)  All-Options Talkline   (858) 782-2512  Substance Abuse Referral   (639) 778-5472 (HELP)

## 2024-02-24 NOTE — PATIENT INSTRUCTIONS
"Recommendations from today's MTM visit:                                                    MTM (medication therapy management) is a service provided by a clinical pharmacist designed to help you get the most of out of your medicines.   Today we reviewed what your medicines are for, how to know if they are working, that your medicines are safe and how to make your medicine regimen as easy as possible.      Talk to Dr. Gordillo and see if she would like you to continue or stop iron based on last labs?  You tried sertraline for depression in the past and you stopped it in 2018 as your depression was controlled. You could consider switching back to this. Talk with Dr. Gordillo when you see her.  Make sure you are getting   CALCIUM    Postmenopausal women not on estrogen: 1200 mg/day     Dietary sources: These also contain vitamin D  Milk                            8 oz            300 mg  Yogurt                          1 cup           400 mg  Hard cheese                     1.5 oz          300 mg  Cottage cheese                  2 cup           300 mg  Orange juice with Calcium       8 oz            300 mg  Low fat dairy sources are recommended     I recommend use of the International Osteoporosis Foundation Calcium Calculator to get an estimate of daily total intake in the diet (www.iofcalciumcalculator).     Vitamin D recommendations: 1000 international units daily    It is best to get your vitamins from your diet and supplement when needed.    Follow-up: Return in about 1 year (around 2/21/2025). Sooner if needed    It was great speaking with you today.  I value your experience and would be very thankful for your time in providing feedback in our clinic survey. In the next few days, you may receive an email or text message from [employer] with a link to a survey related to your  clinical pharmacist.\"     To schedule another MTM appointment, please call the clinic directly or you may call the MTM scheduling line at " [phone] or toll-free at [phone].     My Clinical Pharmacist's contact information:                                                      Please feel free to contact me with any questions or concerns you have.      Alise Harley, PharmD  Medication Therapy Management Pharmacist  [facility] - Monday and Wednesday 7:30 - 4:00

## 2024-02-28 ENCOUNTER — OFFICE VISIT (OUTPATIENT)
Dept: FAMILY MEDICINE | Facility: CLINIC | Age: 84
End: 2024-02-28
Attending: PHYSICIAN ASSISTANT
Payer: COMMERCIAL

## 2024-02-28 VITALS
BODY MASS INDEX: 34.49 KG/M2 | WEIGHT: 207 LBS | HEIGHT: 65 IN | TEMPERATURE: 97.4 F | OXYGEN SATURATION: 98 % | HEART RATE: 74 BPM | SYSTOLIC BLOOD PRESSURE: 132 MMHG | DIASTOLIC BLOOD PRESSURE: 77 MMHG | RESPIRATION RATE: 24 BRPM

## 2024-02-28 DIAGNOSIS — Z00.00 ENCOUNTER FOR MEDICARE ANNUAL WELLNESS EXAM: Primary | ICD-10-CM

## 2024-02-28 DIAGNOSIS — F41.9 ANXIETY: ICD-10-CM

## 2024-02-28 DIAGNOSIS — D50.8 IRON DEFICIENCY ANEMIA REFRACTORY TO IRON THERAPY: ICD-10-CM

## 2024-02-28 DIAGNOSIS — E53.8 VITAMIN B12 DEFICIENCY (NON ANEMIC): ICD-10-CM

## 2024-02-28 DIAGNOSIS — F33.1 MODERATE EPISODE OF RECURRENT MAJOR DEPRESSIVE DISORDER (H): ICD-10-CM

## 2024-02-28 DIAGNOSIS — K21.00 GASTROESOPHAGEAL REFLUX DISEASE WITH ESOPHAGITIS WITHOUT HEMORRHAGE: ICD-10-CM

## 2024-02-28 DIAGNOSIS — M05.741 RHEUMATOID ARTHRITIS INVOLVING RIGHT HAND WITH POSITIVE RHEUMATOID FACTOR (H): Chronic | ICD-10-CM

## 2024-02-28 DIAGNOSIS — A60.04 HERPES SIMPLEX VULVOVAGINITIS: ICD-10-CM

## 2024-02-28 DIAGNOSIS — L29.9 LOCALIZED PRURITUS: ICD-10-CM

## 2024-02-28 DIAGNOSIS — E66.01 MORBID OBESITY (H): ICD-10-CM

## 2024-02-28 DIAGNOSIS — R03.0 ELEVATED BLOOD PRESSURE READING WITHOUT DIAGNOSIS OF HYPERTENSION: ICD-10-CM

## 2024-02-28 PROCEDURE — 99215 OFFICE O/P EST HI 40 MIN: CPT | Mod: 25 | Performed by: STUDENT IN AN ORGANIZED HEALTH CARE EDUCATION/TRAINING PROGRAM

## 2024-02-28 PROCEDURE — G0439 PPPS, SUBSEQ VISIT: HCPCS | Performed by: STUDENT IN AN ORGANIZED HEALTH CARE EDUCATION/TRAINING PROGRAM

## 2024-02-28 RX ORDER — RESPIRATORY SYNCYTIAL VIRUS VACCINE 120MCG/0.5
0.5 KIT INTRAMUSCULAR ONCE
Qty: 1 EACH | Refills: 0 | Status: CANCELLED | OUTPATIENT
Start: 2024-02-28 | End: 2024-02-28

## 2024-02-28 RX ORDER — BUSPIRONE HYDROCHLORIDE 15 MG/1
15 TABLET ORAL 2 TIMES DAILY
Qty: 60 TABLET | Refills: 0 | Status: SHIPPED | OUTPATIENT
Start: 2024-02-28 | End: 2024-02-29

## 2024-02-28 ASSESSMENT — ANXIETY QUESTIONNAIRES
GAD7 TOTAL SCORE: 5
8. IF YOU CHECKED OFF ANY PROBLEMS, HOW DIFFICULT HAVE THESE MADE IT FOR YOU TO DO YOUR WORK, TAKE CARE OF THINGS AT HOME, OR GET ALONG WITH OTHER PEOPLE?: SOMEWHAT DIFFICULT
7. FEELING AFRAID AS IF SOMETHING AWFUL MIGHT HAPPEN: NOT AT ALL
GAD7 TOTAL SCORE: 5

## 2024-02-28 ASSESSMENT — PATIENT HEALTH QUESTIONNAIRE - PHQ9
SUM OF ALL RESPONSES TO PHQ QUESTIONS 1-9: 12
10. IF YOU CHECKED OFF ANY PROBLEMS, HOW DIFFICULT HAVE THESE PROBLEMS MADE IT FOR YOU TO DO YOUR WORK, TAKE CARE OF THINGS AT HOME, OR GET ALONG WITH OTHER PEOPLE: SOMEWHAT DIFFICULT

## 2024-02-28 NOTE — PROGRESS NOTES
Preventive Care Visit  [facility]  Duy Leyva MD, [facility]  Feb 28, 2024    Assessment & Plan     Encounter for Medicare annual wellness exam  Reviewed lipid results. Discussed vaccination recommendations.    Elevated blood pressure reading without diagnosis of hypertension  No hx of HTN and BP WNL here however BPs are elevated at recent previous medical appointments. Discussed obtaining at-home BP cuff and checking for 3 days, MyChart results.     Rheumatoid arthritis involving right hand with positive rheumatoid factor (H)  Follows with Rheumatology. Last visit on 2/6/24. Currently on methotrexate, Rinvoq. They are obtaining routine lab monitoring.     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  Since at least 2006. Found to be consistent with DENY officially in 2009 with subsequent EGD/CRC unremarkable. Negative H pylori on EGD at that time. Recent CBC with normal ferritin however patient has since stopped supplementation. Plan to restart iron supplementation.     GERD  Taking 40mg pantoprazole daily and largely controlled. Continue.     Vitamin B12 deficiency  Last check about 8 months ago on lower end. Most recent CBC normal but uptrending MCV. Not currently on supplementation. Plan to start 500mcg daily B12 supplement and repeat lab test in 3 months. Orders placed.    Moderate episode of recurrent major depressive disorder (H)  Anxiety  Uncontrolled mood on 100mg desvenlafaxine and 10mg buspirone BID. Plan to continue buspirone and transition from desvenlafaxine to 100mg sertraline. Follow up in one month to reassess.  - busPIRone (BUSPAR) 15 MG tablet  Dispense: 60 tablet; Refill: 0  - sertraline (ZOLOFT) 100 MG tablet     Localized pruritus  On R side of back. No known etiology. Does note that OTC antihistamine is helpful. Received topical cream provided by Dermatology. Discussed trial of daily moisturizer (Aquaphor).    Morbid obesity (H)  Likely contributing to back  pain     Herpes simplex vulvovaginitis  On 500mg valacyclovir BID. No outbreaks since starting suppressive valacyclovir. Ok to refill for 1 yr.    Counseling  Appropriate preventive services were discussed with this patient.  Checklist reviewing preventive services available has been given to the patient.    In addition to the preventive service, I spent 40 minutes discussing the patient's chronic conditions and ongoing management/treatment.    Follow up in one month to reassess mood    Duy Leyva MD  [facility], [place]  2/29/2024      Alejandro Miles is a 83 year old, presenting for the following:  Medicare Visit        2/28/2024    10:03 AM   Additional Questions   Roomed by Carolyn CUNNINGHAM     Via the Health Maintenance questionnaire, the patient has reported the following services have been completed -Eye Exam, this information has been sent to the abstraction team.    Health Care Directive  Patient has a Health Care Directive on file  Advance care planning document is on file and is current.    HPI    Back itching  Ongoing for years.  Thinks it is getting worse.  Mostly on the back, localized to right side.   Clothes with tags used to irritate significantly.  Last year, talked to Dermatologist and was given med to help with the scratching at the top.  Not seasonal, is all the time.   Showering daily. Not hot water, only in for 5 minutes.  No changes in deodorants, shampoos.   Daily OTC use of allergy med is helpful.    BP  Not high today.  Doesn't check at home.  Has been higher at other appointments though.    Weight  Tired of the excess weight.   Tired of the belly.  Diet is admittedly not great.  Not exercising due to back pain.     Back pain  Is seeing pain clinic - received injections last week on Tuesday.     Mood  Not interested in a lot of things right now.  Seems to be worse, over the past 2 months.  Feeling much more irritable.   This is attributed to worsened pain.  Is seeing a therapist  still.   Taking desvenlafaxine and buspirone consistently.    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   Will discuss this with Rheumatology.    Osteoporosis  On Reclast. Seeing Endocrinology.    DENY  Has run out of iron supplement a couple of weeks ago but was taking this.  Did have a peptic ulcer in the past.   This was attributed to eating Excedrin like candy.   This is why she is on the daily PPI.   No abdominal pain which is what symptom she had in the past.  Is taking daily NSAIDs and has been for many years though.        2/22/2024   General Health   How would you rate your overall physical health? (!) FAIR   Feel stress (tense, anxious, or unable to sleep) To some extent   (!) STRESS CONCERN      2/22/2024   Nutrition   Diet: Regular (no restrictions)         2/22/2024   Exercise   Days per week of moderate/strenous exercise 0 days   Average minutes spent exercising at this level 0 min   (!) EXERCISE CONCERN      2/22/2024   Social Factors   Frequency of gathering with friends or relatives Once a week   Worry food won't last until get money to buy more No   Food not last or not have enough money for food? No   Do you have housing?  Yes   Are you worried about losing your housing? No   Lack of transportation? No   Unable to get utilities (heat,electricity)? No         2/28/2024   Fall Risk   Gait Speed Test (Document in seconds) 7   Gait Speed Test Interpretation Greater than 5.01 seconds - ABNORMAL          2/22/2024   Activities of Daily Living- Home Safety   Needs help with the following daily activites Shopping    Preparing meals    Housework    Laundry   Safety concerns in the home Throw rugs in the hallway         2/22/2024   Dental   Dentist two times every year? Yes         2/22/2024   Hearing Screening   Hearing concerns? (!) IT'S HARD TO FOLLOW A CONVERSATION IN A NOISY RESTAURANT OR CROWDED ROOM.         2/22/2024   Driving Risk Screening   Patient/family members have  concerns about driving No         2024   General Alertness/Fatigue Screening   Have you been more tired than usual lately? (!) YES         2024   Urinary Incontinence Screening   Bothered by leaking urine in past 6 months No     Today's PHQ-9 Score:       2024    10:13 AM   PHQ-9 SCORE   PHQ-9 Total Score MyChart 12 (Moderate depression)   PHQ-9 Total Score 12         2024   Substance Use   Alcohol more than 3/day or more than 7/wk No   Do you have a current opioid prescription? No   How severe/bad is pain from 1 to 10? 5/10   Do you use any other substances recreationally? No     Social History     Tobacco Use    Smoking status: Former     Packs/day: 1.00     Years: 41.00     Additional pack years: 0.00     Total pack years: 41.00     Types: Cigarettes     Quit date: 1999     Years since quittin.1    Smokeless tobacco: Never    Tobacco comments:     former smoker   Vaping Use    Vaping Use: Never used   Substance Use Topics    Alcohol use: Yes     Comment: Occasionally,  usually wine    Drug use: No     Mammogram Screening - After age 74- determine frequency with patient based on health status, life expectancy and patient goals    Reviewed and updated as needed this visit by Provider   Tobacco  Allergies    Med Hx  Surg Hx  Fam Hx            Current providers sharing in care for this patient include:  Patient Care Team:  Duy Leyva MD as PCP - General (Family Practice)  Nico Byers MD as Assigned Rheumatology Provider  Annmarie Smith MD as Hospitalist (Endocrinology, Diabetes, and Metabolism)  Annmarie Smith MD as Assigned Endocrinology Provider  Sonu Lemons DPM as Referring Physician (Podiatry)  Diya Joseph PA-C as Physician Assistant (Dermatology)  Duy Leyva MD as Assigned PCP  Margaux Harley RPH as Pharmacist (Pharmacist)  Margaux Harley RPH as Assigned MTM Pharmacist  Bernabe Munoz DPM as Assigned Musculoskeletal  "Provider  Lucille Awad, RN as Lead Care Coordinator  Bhumika Em as [facility] Worker  Melissa cMkeon CHW as Community Health Worker  Deuce Soto MD as Assigned Sleep Provider    The following health maintenance items are reviewed in Epic and correct as of today:  Health Maintenance   Topic Date Due    URINE DRUG SCREEN  Never done    RSV VACCINE (Pregnancy & 60+) (1 - 1-dose 60+ series) Never done    COVID-19 Vaccine (3 - Pfizer risk series) 03/31/2021    EYE EXAM  08/18/2021    MEDICARE ANNUAL WELLNESS VISIT  02/21/2024    PHQ-9  08/28/2024    ANNUAL REVIEW OF HM ORDERS  10/26/2024    A1C  12/05/2024    LIPID  01/29/2025    FALL RISK ASSESSMENT  02/28/2025    DEXA  02/06/2027    ADVANCE CARE PLANNING  02/28/2028    DTAP/TDAP/TD IMMUNIZATION (2 - Td or Tdap) 12/12/2028    DEPRESSION ACTION PLAN  Completed    INFLUENZA VACCINE  Completed    Pneumococcal Vaccine: 65+ Years  Completed    IPV IMMUNIZATION  Aged Out    HPV IMMUNIZATION  Aged Out    MENINGITIS IMMUNIZATION  Aged Out    RSV MONOCLONAL ANTIBODY  Aged Out    ZOSTER IMMUNIZATION  Discontinued        Objective    Exam  /77 (BP Location: Right arm, Patient Position: Sitting, Cuff Size: Adult Large)   Pulse 74   Temp 97.4  F (36.3  C) (Oral)   Resp 24   Ht 1.651 m (5' 5\")   Wt 93.9 kg (207 lb)   SpO2 98%   BMI 34.45 kg/m     Estimated body mass index is 34.45 kg/m  as calculated from the following:    Height as of this encounter: 1.651 m (5' 5\").    Weight as of this encounter: 93.9 kg (207 lb).    Physical Exam  GENERAL: healthy, alert and no distress  HEAD: Normocephalic, atraumatic.   EYES: PERRL. Normal conjunctivae, sclera.   ENT: Normal EAC and TMs bilaterally. Normal oropharynx.   NECK: Supple. No lymphadenopathy appreciated. Trachea midline. Thyroid not enlarged, not TTP.  RESP: lungs clear to auscultation - no rales, rhonchi or wheezes  CV: regular rate and rhythm, normal S1 S2, no murmur, click, rub or " gallop.  No peripheral swelling noted.   ABDOMEN: soft, no TTP x4 quadrants. No hepatomegaly or masses appreciated. BS normactive.  MSK: no gross musculoskeletal defects noted.  SKIN: no suspicious lesions or rashes.  EXT: Warm and well perfused.   NEURO: CNII-XII grossly intact. No focal deficits.  PSYCH: Groomed, dressed appropriately for weather. Normal mood with consistent affect.         2/28/2024   Mini Cog   Clock Draw Score 2 Normal   3 Item Recall 3 objects recalled   Mini Cog Total Score 5     Signed Electronically by: Duy Leyva MD    Answers submitted by the patient for this visit:  Patient Health Questionnaire (Submitted on 2/28/2024)  If you checked off any problems, how difficult have these problems made it for you to do your work, take care of things at home, or get along with other people?: Somewhat difficult  PHQ9 TOTAL SCORE: 12  SPRING-7 (Submitted on 2/28/2024)  SPRING 7 TOTAL SCORE: 5

## 2024-02-28 NOTE — PATIENT INSTRUCTIONS
How to Check Your Blood Pressure at Home    Make sure your blood pressure cuff is validated (produces accurate readings). Go to validateBP.org for a list of blood pressure cuffs.    Take your blood pressure 2 times right after you wake up (5 minutes after resting and 5 minutes between readings) and 2 times right before going to bed for a total of 4 readings in one day.  Make sure the readings are before you take your medications, smoke and after you empty your bladder.   Sit with good back support and feet flat on the floor. Have your blood pressure cuff resting at your heart level.    Write these numbers down for the next 3 days and MyChart me with the results.          Preventive Care Advice   This is general advice given by our system to help you stay healthy. However, your care team may have specific advice just for you. Please talk to your care team about your preventive care needs.  Nutrition  Eat 5 or more servings of fruits and vegetables each day.  Try wheat bread, brown rice and whole grain pasta (instead of white bread, rice, and pasta).  Get enough calcium and vitamin D. Check the label on foods and aim for 100% of the RDA (recommended daily allowance).  Lifestyle  Exercise at least 150 minutes each week   (30 minutes a day, 5 days a week).  Do muscle strengthening activities 2 days a week. These help control your weight and prevent disease.  No smoking.  Wear sunscreen to prevent skin cancer.  Have a dental exam and cleaning every 6 months.  Yearly exams  See your health care team every year to talk about:  Any changes in your health.  Any medicines your care team has prescribed.  Preventive care, family planning, and ways to prevent chronic diseases.  Shots (vaccines)   HPV shots (up to age 26), if you've never had them before.  Hepatitis B shots (up to age 59), if you've never had them before.  COVID-19 shot: Get this shot when it's due.  Flu shot: Get a flu shot every year.  Tetanus shot: Get a  tetanus shot every 10 years.  Pneumococcal, hepatitis A, and RSV shots: Ask your care team if you need these based on your risk.  Shingles shot (for age 50 and up).  General health tests  Diabetes screening:  Starting at age 35, Get screened for diabetes at least every 3 years.  If you are younger than age 35, ask your care team if you should be screened for diabetes.  Cholesterol test: At age 39, start having a cholesterol test every 5 years, or more often if advised.  Bone density scan (DEXA): At age 50, ask your care team if you should have this scan for osteoporosis (brittle bones).  Hepatitis C: Get tested at least once in your life.  STIs (sexually transmitted infections)  Before age 24: Ask your care team if you should be screened for STIs.  After age 24: Get screened for STIs if you're at risk. You are at risk for STIs (including HIV) if:  You are sexually active with more than one person.  You don't use condoms every time.  You or a partner was diagnosed with a sexually transmitted infection.  If you are at risk for HIV, ask about PrEP medicine to prevent HIV.  Get tested for HIV at least once in your life, whether you are at risk for HIV or not.  Cancer screening tests  Cervical cancer screening: If you have a cervix, begin getting regular cervical cancer screening tests at age 21. Most people who have regular screenings with normal results can stop after age 65. Talk about this with your provider.  Breast cancer scan (mammogram): If you've ever had breasts, begin having regular mammograms starting at age 40. This is a scan to check for breast cancer.  Colon cancer screening: It is important to start screening for colon cancer at age 45.  Have a colonoscopy test every 10 years (or more often if you're at risk) Or, ask your provider about stool tests like a FIT test every year or Cologuard test every 3 years.  To learn more about your testing options, visit: https://www.[employer]/650166.pdf.  For help  making a decision, visit: https://bit.ly/mj53735.  Prostate cancer screening test: If you have a prostate and are age 55 to 69, ask your provider if you would benefit from a yearly prostate cancer screening test.  Lung cancer screening: If you are a current or former smoker age 50 to 80, ask your care team if ongoing lung cancer screenings are right for you.  For informational purposes only. Not to replace the advice of your health care provider. Copyright   2023 [employer] All rights reserved. Clinically reviewed by the [facility]. [employer] 021870 - REV 01/24.    Learning About Activities of Daily Living  What are activities of daily living?     Activities of daily living (ADLs) are the basic self-care tasks you do every day. As you age, and if you have health problems, you may find that it's harder to do these things for yourself. That's when you may need some help.  Your doctor uses ADLs to measure how much help you need. Knowing what you can and can't do for yourself is an important first step to getting help. And when you have the help you need, you can stay as independent as possible.  Your doctor will want to know if you are able to do tasks such as:  Take a bath or shower without help.  Go to the bathroom by yourself.  Dress and undress without help.  Shave, comb your hair, and brush teeth on your own.  Get in and out of bed or a chair without help.  Feed yourself without help.  If you are having trouble doing basic self-care tasks, talk with your doctor. You may want to bring a caregiver or family member who can help the doctor understand your needs and abilities.  How will a doctor assess your ADLs?  Asking about ADLs is part of a routine health checkup your doctor will likely do as you age. Your health check might be done in a doctor's office, in your home, or at a hospital. The goal is to find out if you are having any problems that could make your health  problems worse or that make it unsafe for you to be on your own.  To measure your ADLs, your doctor will ask how hard it is for you to do routine tasks. He or she may also want to know if you have changed the way you do a task because of a health problem. He or she may watch how you:  Walk back and forth.  Keep your balance while you stand or walk.  Move from sitting to standing or from a bed to a chair.  Button or unbutton a shirt or sweater.  Remove and put on your shoes.  It's normal to feel a little worried or anxious if you find you can't do all the things you used to be able to do. Talking with your doctor about ADLs isn't a test that you either pass or fail. It's just a way to get more information about your health and safety.  Follow-up care is a key part of your treatment and safety. Be sure to make and go to all appointments, and call your doctor if you are having problems. It's also a good idea to know your test results and keep a list of the medicines you take.  Current as of: February 26, 2023               Content Version: 13.8    7502-5125 [employer].   Care instructions adapted under license by your healthcare professional. If you have questions about a medical condition or this instruction, always ask your healthcare professional. [employer] disclaims any warranty or liability for your use of this information.      Preventing Falls: Care Instructions  Injuries and health problems such as trouble walking or poor eyesight can increase your risk of falling. So can some medicines. But there are things you can do to help prevent falls. You can exercise to get stronger. You can also arrange your home to make it safer.    Talk to your doctor about the medicines you take. Ask if any of them increase the risk of falls and whether they can be changed or stopped.   Try to exercise regularly. It can help improve your strength and balance. This can help lower your risk of falling.      "Practice fall safety and prevention.    Wear low-heeled shoes that fit well and give your feet good support. Talk to your doctor if you have foot problems that make this hard.  Carry a cellphone or wear a medical alert device that you can use to call for help.  Use stepladders instead of chairs to reach high objects. Don't climb if you're at risk for falls. Ask for help, if needed.  Wear the correct eyeglasses, if you need them.    Make your home safer.    Remove rugs, cords, clutter, and furniture from walkways.  Keep your house well lit. Use night-lights in hallways and bathrooms.  Install and use sturdy handrails on stairways.  Wear nonskid footwear, even inside. Don't walk barefoot or in socks without shoes.    Be safe outside.    Use handrails, curb cuts, and ramps whenever possible.  Keep your hands free by using a shoulder bag or backpack.  Try to walk in well-lit areas. Watch out for uneven ground, changes in pavement, and debris.  Be careful in the winter. Walk on the grass or gravel when sidewalks are slippery. Use de-icer on steps and walkways. Add non-slip devices to shoes.    Put grab bars and nonskid mats in your shower or tub and near the toilet. Try to use a shower chair or bath bench when bathing.   Get into a tub or shower by putting in your weaker leg first. Get out with your strong side first. Have a phone or medical alert device in the bathroom with you.   Where can you learn more?  Go to https://www.[employer]algomed.net/patiented  Enter G117 in the search box to learn more about \"Preventing Falls: Care Instructions.\"  Current as of: July 18, 2023               Content Version: 13.8    4341-4862 [employer].   Care instructions adapted under license by your healthcare professional. If you have questions about a medical condition or this instruction, always ask your healthcare professional. [employer] disclaims any warranty or liability for your use of this " information.      Hearing Loss: Care Instructions  Overview     Hearing loss is a sudden or slow decrease in how well you hear. It can range from slight to profound. Permanent hearing loss can occur with aging. It also can happen when you are exposed long-term to loud noise. Examples include listening to loud music, riding motorcycles, or being around other loud machines.  Hearing loss can affect your work and home life. It can make you feel lonely or depressed. You may feel that you have lost your independence. But hearing aids and other devices can help you hear better and feel connected to others.  Follow-up care is a key part of your treatment and safety. Be sure to make and go to all appointments, and call your doctor if you are having problems. It's also a good idea to know your test results and keep a list of the medicines you take.  How can you care for yourself at home?  Avoid loud noises whenever possible. This helps keep your hearing from getting worse.  Always wear hearing protection around loud noises.  Wear a hearing aid as directed.  A professional can help you pick a hearing aid that will work best for you.  You can also get hearing aids over the counter for mild to moderate hearing loss.  Have hearing tests as your doctor suggests. They can show whether your hearing has changed. Your hearing aid may need to be adjusted.  Use other devices as needed. These may include:  Telephone amplifiers and hearing aids that can connect to a television, stereo, radio, or microphone.  Devices that use lights or vibrations. These alert you to the doorbell, a ringing telephone, or a baby monitor.  Television closed-captioning. This shows the words at the bottom of the screen. Most new TVs can do this.  TTY (text telephone). This lets you type messages back and forth on the telephone instead of talking or listening. These devices are also called TDD. When messages are typed on the keyboard, they are sent over the  "phone line to a receiving TTY. The message is shown on a monitor.  Use text messaging, social media, and email if it is hard for you to communicate by telephone.  Try to learn a listening technique called speechreading. It is not lipreading. You pay attention to people's gestures, expressions, posture, and tone of voice. These clues can help you understand what a person is saying. Face the person you are talking to, and have them face you. Make sure the lighting is good. You need to see the other person's face clearly.  Think about counseling if you need help to adjust to your hearing loss.  When should you call for help?  Watch closely for changes in your health, and be sure to contact your doctor if:    You think your hearing is getting worse.     You have new symptoms, such as dizziness or nausea.   Where can you learn more?  Go to https://www.[employer]ap/patiented  Enter R798 in the search box to learn more about \"Hearing Loss: Care Instructions.\"  Current as of: February 28, 2023               Content Version: 13.8    6749-6611 [employer].   Care instructions adapted under license by your healthcare professional. If you have questions about a medical condition or this instruction, always ask your healthcare professional. [employer] disclaims any warranty or liability for your use of this information.      Learning About Stress  What is stress?     Stress is your body's response to a hard situation. Your body can have a physical, emotional, or mental response. Stress is a fact of life for most people, and it affects everyone differently. What causes stress for you may not be stressful for someone else.  A lot of things can cause stress. You may feel stress when you go on a job interview, take a test, or run a race. This kind of short-term stress is normal and even useful. It can help you if you need to work hard or react quickly. For example, stress can help you finish an important " job on time.  Long-term stress is caused by ongoing stressful situations or events. Examples of long-term stress include long-term health problems, ongoing problems at work, or conflicts in your family. Long-term stress can harm your health.  How does stress affect your health?  When you are stressed, your body responds as though you are in danger. It makes hormones that speed up your heart, make you breathe faster, and give you a burst of energy. This is called the fight-or-flight stress response. If the stress is over quickly, your body goes back to normal and no harm is done.  But if stress happens too often or lasts too long, it can have bad effects. Long-term stress can make you more likely to get sick, and it can make symptoms of some diseases worse. If you tense up when you are stressed, you may develop neck, shoulder, or low back pain. Stress is linked to high blood pressure and heart disease.  Stress also harms your emotional health. It can make you newberry, tense, or depressed. Your relationships may suffer, and you may not do well at work or school.  What can you do to manage stress?  You can try these things to help manage stress:   Do something active. Exercise or activity can help reduce stress. Walking is a great way to get started. Even everyday activities such as housecleaning or yard work can help.  Try yoga or anna chi. These techniques combine exercise and meditation. You may need some training at first to learn them.  Do something you enjoy. For example, listen to music or go to a movie. Practice your hobby or do volunteer work.  Meditate. This can help you relax, because you are not worrying about what happened before or what may happen in the future.  Do guided imagery. Imagine yourself in any setting that helps you feel calm. You can use online videos, books, or a teacher to guide you.  Do breathing exercises. For example:  From a standing position, bend forward from the waist with your knees  "slightly bent. Let your arms dangle close to the floor.  Breathe in slowly and deeply as you return to a standing position. Roll up slowly and lift your head last.  Hold your breath for just a few seconds in the standing position.  Breathe out slowly and bend forward from the waist.  Let your feelings out. Talk, laugh, cry, and express anger when you need to. Talking with supportive friends or family, a counselor, or a jena leader about your feelings is a healthy way to relieve stress. Avoid discussing your feelings with people who make you feel worse.  Write. It may help to write about things that are bothering you. This helps you find out how much stress you feel and what is causing it. When you know this, you can find better ways to cope.  What can you do to prevent stress?  You might try some of these things to help prevent stress:  Manage your time. This helps you find time to do the things you want and need to do.  Get enough sleep. Your body recovers from the stresses of the day while you are sleeping.  Get support. Your family, friends, and community can make a difference in how you experience stress.  Limit your news feed. Avoid or limit time on social media or news that may make you feel stressed.  Do something active. Exercise or activity can help reduce stress. Walking is a great way to get started.  Where can you learn more?  Go to https://www.[employer].net/patiented  Enter N032 in the search box to learn more about \"Learning About Stress.\"  Current as of: February 26, 2023               Content Version: 13.8    2772-7956 [employer].   Care instructions adapted under license by your healthcare professional. If you have questions about a medical condition or this instruction, always ask your healthcare professional. [employer] disclaims any warranty or liability for your use of this information.      Learning About Sleeping Well  What does sleeping well mean?     Sleeping " well means getting enough sleep to feel good and stay healthy. How much sleep is enough varies among people.  The number of hours you sleep and how you feel when you wake up are both important. If you do not feel refreshed, you probably need more sleep. Another sign of not getting enough sleep is feeling tired during the day.  Experts recommend that adults get at least 7 or more hours of sleep per day. Children and older adults need more sleep.  Why is getting enough sleep important?  Getting enough quality sleep is a basic part of good health. When your sleep suffers, your physical health, mood, and your thoughts can suffer too. You may find yourself feeling more grumpy or stressed. Not getting enough sleep also can lead to serious problems, including injury, accidents, anxiety, and depression.  What might cause poor sleeping?  Many things can cause sleep problems, including:  Changes to your sleep schedule.  Stress. Stress can be caused by fear about a single event, such as giving a speech. Or you may have ongoing stress, such as worry about work or school.  Depression, anxiety, and other mental or emotional conditions.  Changes in your sleep habits or surroundings. This includes changes that happen where you sleep, such as noise, light, or sleeping in a different bed. It also includes changes in your sleep pattern, such as having jet lag or working a late shift.  Health problems, such as pain, breathing problems, and restless legs syndrome.  Lack of regular exercise.  Using alcohol, nicotine, or caffeine before bed.  How can you help yourself?  Here are some tips that may help you sleep more soundly and wake up feeling more refreshed.  Your sleeping area   Use your bedroom only for sleeping and sex. A bit of light reading may help you fall asleep. But if it doesn't, do your reading elsewhere in the house. Try not to use your TV, computer, smartphone, or tablet while you are in bed.  Be sure your bed is big enough  "to stretch out comfortably, especially if you have a sleep partner.  Keep your bedroom quiet, dark, and cool. Use curtains, blinds, or a sleep mask to block out light. To block out noise, use earplugs, soothing music, or a \"white noise\" machine.  Your evening and bedtime routine   Create a relaxing bedtime routine. You might want to take a warm shower or bath, or listen to soothing music.  Go to bed at the same time every night. And get up at the same time every morning, even if you feel tired.  What to avoid   Limit caffeine (coffee, tea, caffeinated sodas) during the day, and don't have any for at least 6 hours before bedtime.  Avoid drinking alcohol before bedtime. Alcohol can cause you to wake up more often during the night.  Try not to smoke or use tobacco, especially in the evening. Nicotine can keep you awake.  Limit naps during the day, especially close to bedtime.  Avoid lying in bed awake for too long. If you can't fall asleep or if you wake up in the middle of the night and can't get back to sleep within about 20 minutes, get out of bed and go to another room until you feel sleepy.  Avoid taking medicine right before bed that may keep you awake or make you feel hyper or energized. Your doctor can tell you if your medicine may do this and if you can take it earlier in the day.  If you can't sleep   Imagine yourself in a peaceful, pleasant scene. Focus on the details and feelings of being in a place that is relaxing.  Get up and do a quiet or boring activity until you feel sleepy.  Avoid drinking any liquids before going to bed to help prevent waking up often to use the bathroom.  Where can you learn more?  Go to https://www.[employer].net/patiented  Enter J942 in the search box to learn more about \"Learning About Sleeping Well.\"  Current as of: July 11, 2023               Content Version: 13.8    0989-7317 [employer].   Care instructions adapted under license by your healthcare professional. " If you have questions about a medical condition or this instruction, always ask your healthcare professional. [employer], [facility] disclaims any warranty or liability for your use of this information.      Learning About Depression Screening  What is depression screening?  Depression screening is a way to see if you have depression symptoms. It may be done by a doctor or counselor. It's often part of a routine checkup. That's because your mental health is just as important as your physical health.  Depression is a mental health condition that affects how you feel, think, and act. You may:  Have less energy.  Lose interest in your daily activities.  Feel sad and grouchy for a long time.  Depression is very common. It affects people of all ages.  Many things can lead to depression. Some people become depressed after they have a stroke or find out they have a major illness like cancer or heart disease. The death of a loved one or a breakup may lead to depression. It can run in families. Most experts believe that a combination of inherited genes and stressful life events can cause it.  What happens during screening?  You may be asked to fill out a form about your depression symptoms. You and the doctor will discuss your answers. The doctor may ask you more questions to learn more about how you think, act, and feel.  What happens after screening?  If you have symptoms of depression, your doctor will talk to you about your options.  Doctors usually treat depression with medicines or counseling. Often, combining the two works best. Many people don't get help because they think that they'll get over the depression on their own. But people with depression may not get better unless they get treatment.  The cause of depression is not well understood. There may be many factors involved. But if you have depression, it's not your fault.  A serious symptom of depression is thinking about death or suicide. If you or someone you  "care about talks about this or about feeling hopeless, get help right away.  It's important to know that depression can be treated. Medicine, counseling, and self-care may help.  Where can you learn more?  Go to https://www.[employer].net/patiented  Enter T185 in the search box to learn more about \"Learning About Depression Screening.\"  Current as of: June 25, 2023               Content Version: 13.8    4733-1813 [employer].   Care instructions adapted under license by your healthcare professional. If you have questions about a medical condition or this instruction, always ask your healthcare professional. [employer] disclaims any warranty or liability for your use of this information.      "

## 2024-02-29 ENCOUNTER — PATIENT OUTREACH (OUTPATIENT)
Dept: CARE COORDINATION | Facility: CLINIC | Age: 84
End: 2024-02-29
Payer: COMMERCIAL

## 2024-02-29 PROBLEM — A60.04 HERPES SIMPLEX VULVOVAGINITIS: Status: ACTIVE | Noted: 2024-02-29

## 2024-02-29 RX ORDER — SERTRALINE HYDROCHLORIDE 100 MG/1
100 TABLET, FILM COATED ORAL DAILY
Qty: 30 TABLET | Refills: 0 | Status: SHIPPED | OUTPATIENT
Start: 2024-02-29 | End: 2024-04-03

## 2024-02-29 NOTE — PROGRESS NOTES
Clinic Care Coordination Contact    Situation: RNCC received request from W CC to review changing clinic care coordination enrollment to maintenance.     Background: Care Coordination initial assessment and enrollment took place 6/5/2023. WCC outreach completed with patient on 02/29/24. Patient expressed no new goals, needs, resources, questions, concerns, support needs from clinic care coordination team at this time.     Assessment: Maintenance review request approved.     Plan/Recommendations: Care coordinator will follow up with patient in 2 months to identify any outstanding questions, concerns, resource and/or support needs and review if appropriate to transition to clinic care coordination graduation at this time. Care coordinator will remain available if/as needed.     Lucille Awad RN Care Coordinator  [facility] [place]  Email: [email or web address]  Phone: [phone]

## 2024-02-29 NOTE — PROGRESS NOTES
Clinic Care Coordination Contact  Community Health Worker Follow Up    Care Gaps:     Health Maintenance Due   Topic Date Due    URINE DRUG SCREEN  Never done    RSV VACCINE (Pregnancy & 60+) (1 - 1-dose 60+ series) Never done    COVID-19 Vaccine (3 - Pfizer risk series) 03/31/2021    MEDICARE ANNUAL WELLNESS VISIT  02/21/2024       Care Plan:   Care Plan: Specialty Referral Completed 2/29/2024      Problem: Patient is in need of specialty care  Resolved 2/29/2024      Long-Range Goal: Establish consistent relationship with specialist(s) as needed  Completed 2/29/2024      Start Date: 6/8/2023 Expected End Date: 5/31/2024    This Visit's Progress: 100% Recent Progress: 90%    Note:     Barriers: diagnosis of multiple, chronic, complex medical conditions, provider availability - wait time to complete appointments, etc.   Strengths: motivated, engaged in care coordination.   Patient expressed understanding of goal: yes  Action steps to achieve this goal:  1. I will follow up with my providers as scheduled/recommended.    Mental Health: 12/19/23 (meets 1-2x/mo with therapist)   Eye exam:10/3/23 per patient   MT Pharmacy: 7/31/23 - follow up recommended 6 months; 01/2024 TBD (1/19/24 - transferred call to [facility] scheduling line to schedule with Alise Harley)   Endocrinology: 08/02/23 - biopsy 08/10/23 - needs to schedule follow up TBD   Rheumatology: 12/01/23 - follow up scheduled 02/06/24   Sleep: 01/24/2024   Primary Care Provider 02/28/2024   Dexa: 02/06/24   Podiatry 05/11/23 - declines to follow up at this time.    Physical Therapy: (in progress at [facility] - 11/1/23)(12/12/23 - one more PT session after [place]).  2. I will discuss, review, schedule and complete recommended overdue health maintenance with my Primary Care Provider.   3. I will contact my care team with questions, concerns, support needs. I will use the clinic as a resource and I understand I can contact my clinic with 24/7 after hours services  available. Care Coordinator will remain available as needed.      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   Patient expressed understanding of goal: Yes  Action steps to achieve this goal:  1. I will review the following resources: (completed)  Hot Meals:   Easter by the [facility]                          [address], [place] [address]  Note: Please do NOT go to the building located at [address]. Loaves & Fishes meals are served at the corner of [address] and [address].     Meals Served: Monday - Thursday from 5:30 - 6:30 PM  Service Location: Meals served at the [facility] front doors (2 white double doors)  [employer] - [phone]            [address] [facility]. [facility], [address]  2nd Thursday of each month - curbside available. 5:30-6:30 pm     [facility] - [phone]                        [address] [place] [address]  Wednesdays, 5:45-6:25 pm during school year only.      [place], Mother of the [facility] - [phone]        [address] [place] [address]  Every 3rd and 4th Thursday, 5:00-6:00 pm.                    Curbside only due to COVID-19.  Those wishing a meal should enter the lower parking lot and follow directions. Meals are picked up by Door 1.     [facility] - [phone]     [address] [place], [place]   Drive Through Meals: Monday 5:30-6:30 pm     [facility]  [facility] - [phone]  [address], [place] [address]  Monday thru Thursday, 5:30-6:30 pm. Curbside only due to COVID-19.     Food Pantries:   [facility] [phone] [address], [place], [place], [address]   Partners with local charities and offers the [address] [place] [address], [facility] Food Shelves      [facility] the [facility] - [phone] [address] [place] [address] Hours: Tue 10:20 AM - 3:40 PM , Thu 10:20 AM - 3:40 PM  Fees: Free       The main service they offer is [employer] food shelf. They also run several federal government USDA supported programs in [facility]. This is run in partnership with [address] [place] [address] and other local social service agencies.      [facility] - [phone]  [address] [place] [address]    Hours: Tue 11:30 AM - 12:30 PM  Fees: Self Pay       [facility] - [phone] [address] [place] [address]   Hours: Mon - Wed 10:00 AM - 3:00 PM , Tue 5:30 PM - 7:30 PM , Thu 5:30 PM - 7:30 PM , Thu - Fri 10:00 AM - 12:00 PM  Fees: Free       [facility]  [phone]   Serves families in [facility]. Programs can provide food, meals, and other non-financial support.      [facility] [phone] I   [address] [place], [place], [address]   This location operates a food shelf and other social service programs. Assistance offered is extensive, and includes personal hygiene supplies, household products, laundry and household . Or speak to a  and apply for other resources such as food stamps, [facility] welfare, or more.      A second location of the [facility] is located at [address] [place], [place], [address]. They offer many of the same programs and resources as indicated above.      [address] [place] [address] -  [facility] Shelf - [phone] [address] [place] [address] Hours: Tue 10:00 PM - 3:00 PM Appt. Only, Thu 12:00 PM - 6:00 PM Appt. Only  Fees: Free  (7/24/23 - going to check into this resource)     [facility]  - [phone] [address] [place] [address]  Hours: Sat 11:30 PM - 12:30 PM  Fees: Free       [facility] [phone]   [address] [place], [place], [address]   The non-profit offers Free Food for seniors and other income qualified elderly and residents. The main resource they offer from this facility is the Nutrition Assistance for Seniors (NAPS) supplemental food program. Government commodities, vouchers, and other food assistance is provided     [place] [facility] [phone] [address] [place], [address]      [facility] - [phone] [address] [place] [address]  Hours: Tue 1:00 PM - 3:30 PM  Fees: Free    [facility] Coord[facility]   [address] [place] [address]  Fees: Free [phone]   Hours: Mon - Thu 9:00 AM - 4:00 PM      [facility]   [address] [place] [address]  Fees: Free [phone]  Hours: Mon 4:00 PM - 6:00 PM Tue 11:00 AM - 1:00 PM , Wed 4:00 PM - 6:00 PM , Thu 10:30 AM - 12:30 PM, 2-3 PM      [facility] [address] [place] [address]  Fees: Free [phone]  Hours: Tue - Sat 10:00 AM - 6:00 PM      [place] Mother of the [facility] - [phone] Ext. 234   [address] [place], [place] [address]Ngcfo: Tue 10:00 AM - 12:00 PM  Fees: Free      Additional food pantries operate in [facility] [phone]   The centers may offer groceries, hot meals, and special seasonal programs such as summer snacks for students or free [employer] and Thanksgiving meals      2. I will sign up for [employer] program and is utilizing $80 per month at [employer]  location in her area (completed)                                Intervention and Education during outreach: Goals completed, patient states that she is in the process of getting approval for PCA services through the county, going to have daughter be PCA and be on an elderly waiver. Pt lives in senior apartment, daughter has told her she will not let her move to an long term as pt will move in with dtr when needing more assistance. Patient is still driving. She has no further needs or concerns for CC resources or supports.  CHW reviewed goal, patient has completed appts and have scheduled follow up as needed/ appropriate.     CHW Plan: Routing to lead CC to review for maintenance, if accepted next outreach will be in two months.    NATALY Cifuentes, B.S. [facility] Care Coordination  [facility]:  [place], [place] and [place]  [phone]  [email or web address]

## 2024-03-01 NOTE — TELEPHONE ENCOUNTER
Prior authorization required for : Methotrexate 2.5mg  Insurance plan: [employer]  Patient Id: [id number]  Help Desk Number: [phone]    **per plan, medicare part b vs d determination required**    Please fax over an alternative medication to the pharmacy or if you are going to pursue a prior authorization please contact the pharmacy and patient when approved. Thanks!     RALES

## 2024-03-04 ENCOUNTER — MYC MEDICAL ADVICE (OUTPATIENT)
Dept: RHEUMATOLOGY | Facility: CLINIC | Age: 84
End: 2024-03-04
Payer: COMMERCIAL

## 2024-03-05 ENCOUNTER — MYC MEDICAL ADVICE (OUTPATIENT)
Dept: FAMILY MEDICINE | Facility: CLINIC | Age: 84
End: 2024-03-05
Payer: COMMERCIAL

## 2024-03-05 NOTE — TELEPHONE ENCOUNTER
I am happy to help order this for her. Please help her set up an office visit (as they will likely need specific documentation on why this is medically necessary).     Thanks,    Duy Leyva MD  [facility]  3/5/2024

## 2024-03-05 NOTE — TELEPHONE ENCOUNTER
Thanks for the update! Average BP is 132/67.  This is a reasonable BP for her age. I am not currently concerned. We can continue to monitor at upcoming office visits.    Please let her know.    Duy Leyva MD  [facility] [place]  3/5/2024

## 2024-03-06 ENCOUNTER — OFFICE VISIT (OUTPATIENT)
Dept: OTOLARYNGOLOGY | Facility: CLINIC | Age: 84
End: 2024-03-06
Payer: COMMERCIAL

## 2024-03-06 VITALS
DIASTOLIC BLOOD PRESSURE: 84 MMHG | OXYGEN SATURATION: 100 % | SYSTOLIC BLOOD PRESSURE: 149 MMHG | HEART RATE: 77 BPM | RESPIRATION RATE: 18 BRPM

## 2024-03-06 DIAGNOSIS — J30.0 VASOMOTOR RHINITIS: ICD-10-CM

## 2024-03-06 DIAGNOSIS — J34.89 NASAL SORE: Primary | ICD-10-CM

## 2024-03-06 PROCEDURE — 99203 OFFICE O/P NEW LOW 30 MIN: CPT | Performed by: OTOLARYNGOLOGY

## 2024-03-06 RX ORDER — IPRATROPIUM BROMIDE 42 UG/1
2 SPRAY, METERED NASAL 4 TIMES DAILY PRN
Qty: 15 ML | Refills: 11 | Status: SHIPPED | OUTPATIENT
Start: 2024-03-06

## 2024-03-06 NOTE — LETTER
3/6/2024         RE: Ginger Marshall  [address]  [facility] [address]        Dear Colleague,    Thank you for referring your patient, Ginger Marshall, to the [facility]. Please see a copy of my visit note below.    Chief Complaint - nasal sore    History of Present Illness - Ginger Marshall is a 83 year old female who returns with a sore in the nose. The patient has noticed for approximately a year. I saw her 6/2018 and 2/2019 for this. She has had some bleeding. It waxes and wanes. Had this in the past before I saw her, worse in the winter, but it usually goes away. It can be painful. Uses vaseline 1-2 times per day. She has nasal drainage. nonsmoker. No lumps or swollen glands in the neck.  I saw her last June and she had a 4 mm ulceration in the left nasal sill that I cauterized. Now things are bad again. Her right nose now has the same thing.     Past Medical History -   Patient Active Problem List   Diagnosis     Rheumatoid arthritis involving right hand with positive rheumatoid factor (H)     History of colonic polyps     Multinodular goiter     Pulmonary nodule     PSEUDOPHAKIA OU     PVD (POSTERIOR VITREOUS DETACHMENT) OU     PXF (PSEUDOEXFOLIATION OF LENS CAPSULE) OD     GERD (gastroesophageal reflux disease)     Hyperlipidemia LDL goal <100     Advance Care Planning     Neuropathy     SHERRY (obstructive sleep apnea)-severe (AHI 35)     zEncounter for counseling     Anemia of chronic disease     Osteoporosis     Cornea guttata, ou     Conjunctival concretions     Stenosis, spinal, lumbar     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     MGD (meibomian gland dysfunction)     Prediabetes     Chronic bilateral low back pain without sciatica     Allergic state, subsequent encounter     Anxiety     DDD (degenerative disc disease), lumbar     Chronic right shoulder pain     Moderate episode of recurrent major depressive disorder (H)     Rheumatic mitral stenosis      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     History of bisphosphonate therapy     Morbid obesity (H)     Immunocompromised (H24)     Thoracic spine pain     Other closed intra-articular fracture of distal end of left radius, initial encounter     Closed fracture of shaft of left ulna, unspecified fracture morphology, initial encounter     Closed fracture of olecranon process of right ulna with routine healing     Herpes simplex vulvovaginitis       Current Medications -   Current Outpatient Medications:      acetaminophen (TYLENOL) 325 MG tablet, Take 2 tablets (650 mg) by mouth every 6 hours as needed for mild pain Max tylenol 3000 mg in 24 hours, Disp: , Rfl:      albuterol (PROAIR HFA/PROVENTIL HFA/VENTOLIN HFA) 108 (90 Base) MCG/ACT inhaler, Inhale 2 puffs into the lungs every 6 hours as needed for shortness of breath, wheezing or cough, Disp: , Rfl:      atorvastatin (LIPITOR) 40 MG tablet, Take 1 tablet (40 mg) by mouth daily, Disp: 90 tablet, Rfl: 3     busPIRone (BUSPAR) 10 MG tablet, Take 1 tablet (10 mg) by mouth 2 times daily, Disp: 180 tablet, Rfl: 1     CALCIUM CITRATE PO, Take 300 mg by mouth daily Take with meal that contains least amount of calcium, Disp: , Rfl:      carboxymethylcellulose PF (REFRESH PLUS) 0.5 % ophthalmic solution, Place 1 drop into both eyes 2 times daily as needed for dry eyes, Disp: , Rfl:      Cholecalciferol (VITAMIN D-3 PO), Take 2,000 Units by mouth daily, Disp: , Rfl:      Ferrous Gluconate 324 (37.5 Fe) MG TABS, Take 1 tablet by mouth daily Started 6/2023, Disp: , Rfl:      fluticasone (FLONASE) 50 MCG/ACT nasal spray, Spray 1 spray into both nostrils daily, Disp: 18.2 mL, Rfl: 0     hydrocortisone 2.5 % cream, Apply topically 2 times daily For up to two weeks (Patient not taking: Reported on 2/21/2024), Disp: 20 g, Rfl: 0     leucovorin (WELLCOVORIN) 5 MG tablet, Take 1 tablet (5 mg) by mouth every 7 days . Take 24 hours after taking  Methotrexate each week., Disp: 13 tablet, Rfl: 2     loratadine (CLARITIN) 10 MG tablet, Take 1 tablet (10 mg) by mouth 2 times daily, Disp: , Rfl:      Methotrexate, PF, (RASUVO) 25 MG/0.5ML autoinjector, Inject 0.5 mLs (25 mg) Subcutaneous every 7 days . Hold for signs of infection, and seek medical attention. Take every Thursday., Disp: 2 mL, Rfl: 6     naproxen sodium (ANAPROX) 220 MG tablet, Take 2 tablets (440 mg) by mouth daily as needed for moderate pain (4-6) (less than monthly use currently) (Patient taking differently: Take 440 mg by mouth daily as needed for moderate pain (using a couple times a month)), Disp: , Rfl:      olopatadine (PATADAY) 0.2 % ophthalmic solution, Place 1 drop into both eyes daily as needed Uses mostly in Spring, Disp: , Rfl:      pantoprazole (PROTONIX) 40 MG EC tablet, Take 1 tablet (40 mg) by mouth every morning (before breakfast), Disp: 90 tablet, Rfl: 3     sertraline (ZOLOFT) 100 MG tablet, Take 1 tablet (100 mg) by mouth daily for 30 days, Disp: 30 tablet, Rfl: 0     UNABLE TO FIND, MEDICATION NAME: Distilled water for CPAP, Disp: , Rfl:      upadacitinib ER (RINVOQ) 15 MG tablet, Take 1 tablet (15 mg) by mouth daily . Hold for any infection and seek medical attention., Disp: 30 tablet, Rfl: 6     valACYclovir (VALTREX) 500 MG tablet, Take 1 tablet (500 mg) by mouth 2 times daily, Disp: 180 tablet, Rfl: 1     vitamin C (ASCORBIC ACID) 1000 MG TABS, Take 1 tablet (1,000 mg) by mouth daily, Disp: , Rfl:      zoledronic Acid (RECLAST) 5 MG/100ML SOLN infusion, Inject 5 mg into the vein once Every year (next dose March 2023), Disp: , Rfl:     Allergies -   Allergies   Allergen Reactions     Abatacept Other (See Comments)     Severe headaches  Migraine     Celebrex [Roche-2 Inhibitors]      Ineffective     Celecoxib Unknown and Nausea     Levaquin [Levofloxacin] Fatigue     Severe body pain     Orencia [Abatacept]      Headache     Septra [Bactrim]      Sulfa Antibiotics Nausea and  "Vomiting     \"deathly ill\"  Allergic to everything with Sulfa in it.     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     Drug use: No     Sexual activity: No     Other Topics Concern     Parent/Sibling W/ Cabg, Mi Or Angioplasty Before 65f 55m? No     Caffeine Concern Yes     She has 8 cups of coffee in the AM and is done by 8-8:30 AM.     Exercise Yes     Sometimes.  [employer] comes 3 times a week to her building.     Social History Narrative    She lives in a a senior living apartment building.       Family History -   Family History   Problem Relation Age of Onset     Arthritis Mother      Alzheimer Disease Mother      Hyperlipidemia Mother      Osteoporosis Mother      Heart Disease Father         MI ( from this)     Alcohol/Drug Father      Arthritis Sister      Hypertension Sister      Other Cancer Sister         Luekemia     Neurologic Disorder Sister         Schizophrenic     Hypertension Sister              Hyperlipidemia Sister      Mental Illness Sister         Bipolar     Glaucoma Daughter      Diabetes Son         type 1,      Diabetes Son         ,  cancer     Esophageal Cancer Son  "        Drank and smoked     Substance Abuse Son      Diabetes Other         type 2     Hyperlipidemia Other      Physical Exam  General - The patient is in no distress.  Alert, answers questions and cooperates with examination appropriately.   Voice and Breathing - The patient was breathing comfortably without the use of accessory muscles. There was no wheezing, stridor, or stertor.  The patients voice was clear and strong.  Eyes - Extraocular movements intact. Sclera were not icteric or injected, conjunctiva were pink and moist.  Neurologic - Cranial nerves II-XII are grossly intact. Specifically, the facial nerve is intact, House-Brackmann grade 1 of 6.   Nose - No significant external deformity. She has an approximate 4-5 mm superficial ulceration of the left lateral nasal sill and mucosal epithelial junction. She has a small similar lesion on the right too. There is some bloody crust around them. No heaped up boarders to suggest infection. Further back or septum straight turbinates are normal size no lesions or masses.  Mouth - tongue is midline, no intraoral lesions, no postnasal drainage.  Neck -  Palpation of the occipital, submental, submandibular, internal jugular chain, and supraclavicular nodes did not demonstrate any abnormal lymph nodes or masses. The parotid glands were without masses. The trachea was midline.    A/P -     ICD-10-CM    1. Nasal sore  J34.89 ipratropium (ATROVENT) 0.06 % nasal spray      2. Vasomotor rhinitis  J30.0 ipratropium (ATROVENT) 0.06 % nasal spray          Ginger Marshall is a 83 year old female with lesions on the lateral nasal sill and mucosal epithelial junction on both sides.  I think what is happening is she has vasomotor rhinitis with constant nasal drip that irritates this area. She had this 4-5 years ago. She gets the Kleenex up into this area and has had lots of bleeding and scabbing. She rubs it/picks it. Stop Vaseline as she is sneezing and it is causing more nose  blowing. I do not want her to get the finger Kleenex up at the site any longer.  I want her to try bactroban in case there is a localized staph infection.  I want her to try a humidifier at night because she notes crusting a lot at night.  In addition I will prescribe her Atrovent nasal spray to try to keep her nose from dripping and irritating this area.         Ry Espinosa MD  Otolaryngology  [facility]      Again, thank you for allowing me to participate in the care of your patient.        Sincerely,        Ry Espinosa MD

## 2024-03-06 NOTE — PROGRESS NOTES
Chief Complaint - nasal sore    History of Present Illness - Ginger Marshall is a 83 year old female who returns with a sore in the nose. The patient has noticed for approximately a year. I saw her 6/2018 and 2/2019 for this. She has had some bleeding. It waxes and wanes. Had this in the past before I saw her, worse in the winter, but it usually goes away. It can be painful. Uses vaseline 1-2 times per day. She has nasal drainage. nonsmoker. No lumps or swollen glands in the neck.  I saw her last June and she had a 4 mm ulceration in the left nasal sill that I cauterized. Now things are bad again. Her right nose now has the same thing.     Past Medical History -   Patient Active Problem List   Diagnosis    Rheumatoid arthritis involving right hand with positive rheumatoid factor (H)    History of colonic polyps    Multinodular goiter    Pulmonary nodule    PSEUDOPHAKIA OU    PVD (POSTERIOR VITREOUS DETACHMENT) OU    PXF (PSEUDOEXFOLIATION OF LENS CAPSULE) OD    GERD (gastroesophageal reflux disease)    Hyperlipidemia LDL goal <100    Advance Care Planning    Neuropathy    SHERRY (obstructive sleep apnea)-severe (AHI 35)    zEncounter for counseling    Anemia of chronic disease    Osteoporosis    Cornea guttata, ou    Conjunctival concretions    Stenosis, spinal, lumbar    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    MGD (meibomian gland dysfunction)    Prediabetes    Chronic bilateral low back pain without sciatica    Allergic state, subsequent encounter    Anxiety    DDD (degenerative disc disease), lumbar    Chronic right shoulder pain    Moderate episode of recurrent major depressive disorder (H)    Rheumatic mitral stenosis    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    History of bisphosphonate therapy    Morbid obesity (H)    Immunocompromised (H24)    Thoracic spine pain    Other closed intra-articular fracture of distal end of left radius, initial encounter    Closed fracture  of shaft of left ulna, unspecified fracture morphology, initial encounter    Closed fracture of olecranon process of right ulna with routine healing    Herpes simplex vulvovaginitis       Current Medications -   Current Outpatient Medications:     acetaminophen (TYLENOL) 325 MG tablet, Take 2 tablets (650 mg) by mouth every 6 hours as needed for mild pain Max tylenol 3000 mg in 24 hours, Disp: , Rfl:     albuterol (PROAIR HFA/PROVENTIL HFA/VENTOLIN HFA) 108 (90 Base) MCG/ACT inhaler, Inhale 2 puffs into the lungs every 6 hours as needed for shortness of breath, wheezing or cough, Disp: , Rfl:     atorvastatin (LIPITOR) 40 MG tablet, Take 1 tablet (40 mg) by mouth daily, Disp: 90 tablet, Rfl: 3    busPIRone (BUSPAR) 10 MG tablet, Take 1 tablet (10 mg) by mouth 2 times daily, Disp: 180 tablet, Rfl: 1    CALCIUM CITRATE PO, Take 300 mg by mouth daily Take with meal that contains least amount of calcium, Disp: , Rfl:     carboxymethylcellulose PF (REFRESH PLUS) 0.5 % ophthalmic solution, Place 1 drop into both eyes 2 times daily as needed for dry eyes, Disp: , Rfl:     Cholecalciferol (VITAMIN D-3 PO), Take 2,000 Units by mouth daily, Disp: , Rfl:     Ferrous Gluconate 324 (37.5 Fe) MG TABS, Take 1 tablet by mouth daily Started 6/2023, Disp: , Rfl:     fluticasone (FLONASE) 50 MCG/ACT nasal spray, Spray 1 spray into both nostrils daily, Disp: 18.2 mL, Rfl: 0    hydrocortisone 2.5 % cream, Apply topically 2 times daily For up to two weeks (Patient not taking: Reported on 2/21/2024), Disp: 20 g, Rfl: 0    leucovorin (WELLCOVORIN) 5 MG tablet, Take 1 tablet (5 mg) by mouth every 7 days . Take 24 hours after taking Methotrexate each week., Disp: 13 tablet, Rfl: 2    loratadine (CLARITIN) 10 MG tablet, Take 1 tablet (10 mg) by mouth 2 times daily, Disp: , Rfl:     Methotrexate, PF, (RASUVO) 25 MG/0.5ML autoinjector, Inject 0.5 mLs (25 mg) Subcutaneous every 7 days . Hold for signs of infection, and seek medical attention.  "Take every Thursday., Disp: 2 mL, Rfl: 6    naproxen sodium (ANAPROX) 220 MG tablet, Take 2 tablets (440 mg) by mouth daily as needed for moderate pain (4-6) (less than monthly use currently) (Patient taking differently: Take 440 mg by mouth daily as needed for moderate pain (using a couple times a month)), Disp: , Rfl:     olopatadine (PATADAY) 0.2 % ophthalmic solution, Place 1 drop into both eyes daily as needed Uses mostly in Spring, Disp: , Rfl:     pantoprazole (PROTONIX) 40 MG EC tablet, Take 1 tablet (40 mg) by mouth every morning (before breakfast), Disp: 90 tablet, Rfl: 3    sertraline (ZOLOFT) 100 MG tablet, Take 1 tablet (100 mg) by mouth daily for 30 days, Disp: 30 tablet, Rfl: 0    UNABLE TO FIND, MEDICATION NAME: Distilled water for CPAP, Disp: , Rfl:     upadacitinib ER (RINVOQ) 15 MG tablet, Take 1 tablet (15 mg) by mouth daily . Hold for any infection and seek medical attention., Disp: 30 tablet, Rfl: 6    valACYclovir (VALTREX) 500 MG tablet, Take 1 tablet (500 mg) by mouth 2 times daily, Disp: 180 tablet, Rfl: 1    vitamin C (ASCORBIC ACID) 1000 MG TABS, Take 1 tablet (1,000 mg) by mouth daily, Disp: , Rfl:     zoledronic Acid (RECLAST) 5 MG/100ML SOLN infusion, Inject 5 mg into the vein once Every year (next dose March 2023), Disp: , Rfl:     Allergies -   Allergies   Allergen Reactions    Abatacept Other (See Comments)     Severe headaches  Migraine    Celebrex [Roche-2 Inhibitors]      Ineffective    Celecoxib Unknown and Nausea    Levaquin [Levofloxacin] Fatigue     Severe body pain    Orencia [Abatacept]      Headache    Septra [Bactrim]     Sulfa Antibiotics Nausea and Vomiting     \"deathly ill\"  Allergic to everything with Sulfa in it.    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    Drug use: No    Sexual activity: No     Other Topics Concern    Parent/Sibling W/ Cabg, Mi Or Angioplasty Before 65f 55m? No    Caffeine Concern Yes     She has 8 cups of coffee in the AM and is done by 8-8:30 AM.    Exercise Yes     Sometimes.  [employer] comes 3 times a week to her building.     Social History Narrative    She lives in a a senior living apartment building.       Family History -   Family History   Problem Relation Age of Onset    Arthritis Mother     Alzheimer Disease Mother     Hyperlipidemia Mother     Osteoporosis Mother     Heart Disease Father         MI ( from this)    Alcohol/Drug Father     Arthritis Sister     Hypertension Sister     Other Cancer Sister         Luekemia    Neurologic Disorder Sister         Schizophrenic    Hypertension Sister             Hyperlipidemia Sister     Mental Illness Sister         Bipolar    Glaucoma Daughter     Diabetes Son         type 1,     Diabetes Son         ,  cancer    Esophageal Cancer Son         Drank and smoked    Substance Abuse Son     Diabetes Other         type 2    Hyperlipidemia Other      Physical Exam  General - The patient is in no distress.  Alert, answers questions and cooperates with examination appropriately.   Voice and Breathing - The patient was breathing comfortably without the use of accessory muscles. There was no  wheezing, stridor, or stertor.  The patients voice was clear and strong.  Eyes - Extraocular movements intact. Sclera were not icteric or injected, conjunctiva were pink and moist.  Neurologic - Cranial nerves II-XII are grossly intact. Specifically, the facial nerve is intact, House-Brackmann grade 1 of 6.   Nose - No significant external deformity. She has an approximate 4-5 mm superficial ulceration of the left lateral nasal sill and mucosal epithelial junction. She has a small similar lesion on the right too. There is some bloody crust around them. No heaped up boarders to suggest infection. Further back or septum straight turbinates are normal size no lesions or masses.  Mouth - tongue is midline, no intraoral lesions, no postnasal drainage.  Neck -  Palpation of the occipital, submental, submandibular, internal jugular chain, and supraclavicular nodes did not demonstrate any abnormal lymph nodes or masses. The parotid glands were without masses. The trachea was midline.    A/P -     ICD-10-CM    1. Nasal sore  J34.89 ipratropium (ATROVENT) 0.06 % nasal spray      2. Vasomotor rhinitis  J30.0 ipratropium (ATROVENT) 0.06 % nasal spray          Ginger Marshall is a 83 year old female with lesions on the lateral nasal sill and mucosal epithelial junction on both sides.  I think what is happening is she has vasomotor rhinitis with constant nasal drip that irritates this area. She had this 4-5 years ago. She gets the Kleenex up into this area and has had lots of bleeding and scabbing. She rubs it/picks it. Stop Vaseline as she is sneezing and it is causing more nose blowing. I do not want her to get the finger Kleenex up at the site any longer.  I want her to try bactroban in case there is a localized staph infection.  I want her to try a humidifier at night because she notes crusting a lot at night.  In addition I will prescribe her Atrovent nasal spray to try to keep her nose from dripping and irritating this  area.         Ry Espinosa MD  Otolaryngology  [facility]

## 2024-03-19 ASSESSMENT — PATIENT HEALTH QUESTIONNAIRE - PHQ9
SUM OF ALL RESPONSES TO PHQ QUESTIONS 1-9: 8
10. IF YOU CHECKED OFF ANY PROBLEMS, HOW DIFFICULT HAVE THESE PROBLEMS MADE IT FOR YOU TO DO YOUR WORK, TAKE CARE OF THINGS AT HOME, OR GET ALONG WITH OTHER PEOPLE: SOMEWHAT DIFFICULT
SUM OF ALL RESPONSES TO PHQ QUESTIONS 1-9: 8

## 2024-03-20 ENCOUNTER — VIRTUAL VISIT (OUTPATIENT)
Dept: PSYCHOLOGY | Facility: CLINIC | Age: 84
End: 2024-03-20
Payer: COMMERCIAL

## 2024-03-20 DIAGNOSIS — F33.1 MODERATE EPISODE OF RECURRENT MAJOR DEPRESSIVE DISORDER (H): Primary | ICD-10-CM

## 2024-03-20 PROCEDURE — 90837 PSYTX W PT 60 MINUTES: CPT | Mod: 95

## 2024-03-20 NOTE — PROGRESS NOTES
[facility]                                     Progress Note    Patient Name: Ginger Marshall  Date: 3/20/24         Service Type: Individual      Session Start Time: 9:01 am  Session End Time: 9:54 am     Session Length: 53 min    Session #: 14    Answers submitted by the patient for this visit:  Patient Health Questionnaire (Submitted on 9/26/2023)  If you checked off any problems, how difficult have these problems made it for you to do your work, take care of things at home, or get along with other people?: Somewhat difficult  PHQ9 TOTAL SCORE: 4    Attendees: Client attended alone    Service Modality:  Video Visit:      Provider verified identity through the following two step process.  Patient provided:  Patient is known previously to provider    Telemedicine Visit: The patient's condition can be safely assessed and treated via synchronous audio and visual telemedicine encounter.      Reason for Telemedicine Visit: Patient convenience (e.g. access to timely appointments / distance to available provider)    Originating Site (Patient Location): Patient's home    Distant Site (Provider Location): Provider Remote Setting- Home Office    Consent:  The patient/guardian has verbally consented to: the potential risks and benefits of telemedicine (video visit) versus in person care; bill my insurance or make self-payment for services provided; and responsibility for payment of non-covered services.     Patient would like the video invitation sent by:  My Chart    Mode of Communication:  Video Conference via [facility]well    Distant Location (Provider):  Off-site    As the provider I attest to compliance with applicable laws and regulations related to telemedicine.    DATA  Interactive Complexity: No     Crisis: No    Extended Session (53+ minutes): PROLONGED SERVICE IN THE OUTPATIENT SETTING REQUIRING DIRECT (FACE-TO-FACE) PATIENT CONTACT BEYOND THE USUAL SERVICE:    - Longer session due to limited access  to mental health appointments and necessity to address patient's distress / complexity  Interactive Complexity: No  Crisis: No      Progress Since Last Session (Related to Symptoms / Goals / Homework):   Symptoms: No change   , low energy    Homework: Partially completed      Episode of Care Goals: Minimal progress - ACTION (Actively working towards change); Intervened by reinforcing change plan / affirming steps taken     Current / Ongoing Stressors and Concerns:  Car change-complications regarding title/insurance. Vertigo, head pain, exhaustion getting in the way of pt at home, managing household. Working on On license of [facility] [employer] for support, impatience with the process. Low quality sleep, even with CPAP coming unhooked-waking up tired, nap in afternoon. Focus on committees,  socializing with various L[employer] day groups.   Ongoing: Grieving loss of adult son who was blind and lived alone, fell and passed away, second loss of an adult/child. Regrets about parenting.      Treatment Objective(s) Addressed in This Session:    Practice boundaries and radical acceptance of reality regarding sister, sons, reality of accident  Improve quantity and quality of night time sleep / decrease daytime naps      Intervention:  Supportive therapy: Provided active listening and validation. Provided grounding/mindfulness skills    Motivational Interviewing  Target Behavior:  radical acceptance of reality, gratitude ,   Proactive communication with providers    MI Intervention: Expressed Empathy/Understanding, Supported Autonomy, Collaboration, Evocation and Open-ended questions     Change Talk Expressed by the Patient: Desire to change Reasons to change Activation    Provider Response to Change Talk: E - Evoked more info from patient about behavior change and A - Affirmed patient's thoughts, decisions, or attempts at behavior change    Assessments completed prior to visit:  LAURA  12/5/22  The following assessments were completed by patient for  this visit:  PHQ2:       2/27/2023     8:50 AM 2/24/2023     6:48 AM 2/20/2023     5:39 PM 1/15/2023    11:54 AM 11/16/2022     1:33 PM 8/5/2022     8:11 AM 7/6/2022     4:18 PM   PHQ-2 ( 1999 Pf[employer])   Q1: Little interest or pleasure in doing things 0 1 1 1 1 3    Q2: Feeling down, depressed or hopeless 0 1 1 1 1 0    PHQ-2 Score 0 2 2 2 2 3    Q1: Little interest or pleasure in doing things  Several days Several days Several days Several days Nearly every day    Q2: Feeling down, depressed or hopeless  Several days Several days Several days Several days Not at all    PHQ-2 Score  2 2 2 2 3 Incomplete     PHQ9:       9/26/2023     9:50 AM 11/19/2023    10:24 AM 11/27/2023    12:37 PM 1/16/2024     6:18 PM 1/22/2024     8:44 AM 2/28/2024    10:13 AM 3/19/2024    10:05 AM   PHQ-9 SCORE   PHQ-9 Total Score MyChart 4 (Minimal depression) 6 (Mild depression) 6 (Mild depression) 4 (Minimal depression) 5 (Mild depression) 12 (Moderate depression) 8 (Mild depression)   PHQ-9 Total Score 4 6 6 4 5 12 8     Patient Health Questionnaire (Submitted on 9/26/2023)  If you checked off any problems, how difficult have these problems made it for you to do your work, take care of things at home, or get along with other people?: Somewhat difficult  PHQ9 TOTAL SCORE: 4  GAD2:       3/12/2023    10:36 AM 8/23/2023     8:31 AM 9/23/2023    10:05 AM 11/19/2023    10:24 AM 1/15/2024    10:35 AM 2/16/2024     8:34 AM 3/13/2024     6:28 PM   SPRING-2   Feeling nervous, anxious, or on edge 1 0 0 0 1 1 1   Not being able to stop or control worrying 0 0 0 0 0 0 0   SPRING-2 Total Score 1    1 0 0 0 1 1 1     GAD7:       6/5/2019     4:10 PM 6/16/2020     9:54 AM 7/6/2022     4:18 PM 8/5/2022     8:11 AM 11/16/2022     1:33 PM 6/5/2023     2:31 PM 2/28/2024    10:14 AM   SPRING-7 SCORE   Total Score   2 (minimal anxiety) 5 (mild anxiety) 2 (minimal anxiety)  5 (mild anxiety)   Total Score 0 7 2 5 2 3 5     CAGE-AID:       12/5/2022     9:28 AM    CAGE-AID Total Score   Total Score 0   Total Score MyChart 0 (A total score of 2 or greater is considered clinically significant)     PROMIS 10-Global Health (only subscores and total score):       11/15/2018     1:00 PM 3/25/2022     7:34 AM 12/5/2022     9:28 AM 3/12/2023    10:38 AM 8/23/2023     8:34 AM 12/14/2023     6:28 AM 3/13/2024     6:31 PM   PROMIS-10 Scores Only   Global Mental Health Score 12 12 8    8 8    8 12 13 11   Global Physical Health Score 12 12 13    13 11    11 12 12 11   PROMIS TOTAL - SUBSCORES 24 24 21    21 19    19 24 25 22     Lyman Suicide Severity Rating Scale (Lifetime/Recent)      12/5/2022    11:00 AM   Lyman Suicide Severity Rating (Lifetime/Recent)   Q1 Wished to be Dead (Past Month) no   Q2 Suicidal Thoughts (Past Month) no   Q3 Suicidal Thought Method no   Q4 Suicidal Intent without Specific Plan no   Q5 Suicide Intent with Specific Plan no   Q6 Suicide Behavior (Lifetime) no   Level of Risk per Screen low risk         ASSESSMENT: Current Emotional / Mental Status (status of significant symptoms):   Risk status (Self / Other harm or suicidal ideation)   Patient denies current fears or concerns for personal safety.   Patient denies current or recent suicidal ideation or behaviors.   Patient denies current or recent homicidal ideation or behaviors.   Patient denies current or recent self injurious behavior or ideation.   Patient denies other safety concerns.   Patient reports there has been no change in risk factors since their last session.     Patient reports there has been no change in protective factors since their last session.     Recommended that patient call 911 or go to the local ED should there be a change in any of these risk factors.     Appearance:   Appropriate    Eye Contact:   Good    Psychomotor Behavior: Normal    Attitude:   Pleasant Attentive   Orientation:   All   Speech    Rate / Production: Emotional Normal     Volume:  Normal    Mood:    Anxious     Affect:    Appropriate    Thought Content:  Clear    Thought Form:  Coherent  Logical    Insight:    Good      Medication Review:   No changes to current psychiatric medication(s)     Medication Compliance:   Yes     Changes in Health Issues:   None reported     Chemical Use Review:   Substance Use: Chemical use reviewed, no active concerns identified      Tobacco Use: No current tobacco use.      Diagnosis:  1. Moderate episode of recurrent major depressive disorder (H)          Collateral Reports Completed:   Not Applicable    PLAN: (Patient Tasks / Therapist Tasks / Other)   Embrace radical acceptance and boundaries. Use proactive communication with providers, family, try grounding /mindfulness, review [facility] program info    HECTOR Springer 3/20/2024                                                                                                  Individual Treatment Plan    Patient's Name: Ginger Marshall  YOB: 1940    Date of Creation: 2/8/23  Date Treatment Plan Last Reviewed/Revised:   12/19/2023    DSM5 Diagnoses: 296.31 (F33.0) Major Depressive Disorder, Recurrent Episode, Mild With anxious distress  Psychosocial / Contextual Factors: aging, losses, generational trauma  PROMIS (reviewed every 90 days):   21  12/5/22    Referral / Collaboration:  Referral to another professional/service is not indicated at this time..    Anticipated number of session for this episode of care: 6-9 sessions  Anticipation frequency of session: Every other week  Anticipated Duration of each session: 38-52 minutes  Treatment plan will be reviewed in 90 days or when goals have been changed.     MeasurableTreatment Goal(s) related to diagnosis / functional impairment(s)  Goal 1: Patient will experience an improvement in mood and functioning as evidenced by assessment scores, self report and clinician observation    I will know I've met my goal when things feel better (paraphrase).      Objective #A (Patient  Action)    Patient will increase understanding of steps in the grief process   Talk to others about losses  Use prayer practices and jena community to support well being.   Practice boundaries and radical acceptance of reality regarding sister sons, reality of accident   Engage in social activities at [facility]  Status: 12/19/2023    Intervention(s)  Therapist will teach CBT, DBT  and support grief processing skills, prayer practices .    Objective #B  Patient will Increase interest, engagement, and pleasure in doing things  Decrease frequency and intensity of feeling down, depressed, hopeless  Improve quantity and quality of night time sleep / decrease daytime naps  Feel less tired and more energy during the day   Improve diet, appetite, mindful eating, and / or meal planning  Identify negative self-talk and behaviors: challenge core beliefs, myths, and actions  Improve concentration, focus, and mindfulness in daily activities   Feel less fidgety, restless or slow in daily activities / interpersonal interactions.  Status: 12/19/2023    Intervention(s)  Therapist will  teach cbt, dbt,MI, mindfulness and behavioral activation and assign homework .      Patient has reviewed and agreed to the above plan.      Rocío Arenas, HECTOR  1/22/2024

## 2024-03-25 ENCOUNTER — INFUSION THERAPY VISIT (OUTPATIENT)
Dept: INFUSION THERAPY | Facility: CLINIC | Age: 84
End: 2024-03-25
Attending: INTERNAL MEDICINE
Payer: COMMERCIAL

## 2024-03-25 VITALS
SYSTOLIC BLOOD PRESSURE: 122 MMHG | DIASTOLIC BLOOD PRESSURE: 66 MMHG | HEART RATE: 74 BPM | OXYGEN SATURATION: 93 % | TEMPERATURE: 98.6 F | BODY MASS INDEX: 35.19 KG/M2 | RESPIRATION RATE: 16 BRPM | HEIGHT: 65 IN | WEIGHT: 211.2 LBS

## 2024-03-25 DIAGNOSIS — M81.0 OSTEOPOROSIS, UNSPECIFIED OSTEOPOROSIS TYPE, UNSPECIFIED PATHOLOGICAL FRACTURE PRESENCE: Primary | ICD-10-CM

## 2024-03-25 DIAGNOSIS — Z92.29 HISTORY OF BISPHOSPHONATE THERAPY: ICD-10-CM

## 2024-03-25 LAB
CALCIUM SERPL-MCNC: 9.2 MG/DL (ref 8.8–10.2)
CREAT SERPL-MCNC: 0.61 MG/DL (ref 0.51–0.95)
EGFRCR SERPLBLD CKD-EPI 2021: 89 ML/MIN/1.73M2

## 2024-03-25 PROCEDURE — 82310 ASSAY OF CALCIUM: CPT | Performed by: INTERNAL MEDICINE

## 2024-03-25 PROCEDURE — 36415 COLL VENOUS BLD VENIPUNCTURE: CPT | Performed by: INTERNAL MEDICINE

## 2024-03-25 PROCEDURE — 82565 ASSAY OF CREATININE: CPT | Performed by: INTERNAL MEDICINE

## 2024-03-25 PROCEDURE — 250N000011 HC RX IP 250 OP 636: Mod: JZ | Performed by: INTERNAL MEDICINE

## 2024-03-25 PROCEDURE — 96374 THER/PROPH/DIAG INJ IV PUSH: CPT

## 2024-03-25 RX ORDER — EPINEPHRINE 1 MG/ML
0.3 INJECTION, SOLUTION INTRAMUSCULAR; SUBCUTANEOUS EVERY 5 MIN PRN
OUTPATIENT
Start: 2025-03-25

## 2024-03-25 RX ORDER — ZOLEDRONIC ACID 5 MG/100ML
5 INJECTION, SOLUTION INTRAVENOUS ONCE
Start: 2025-03-25

## 2024-03-25 RX ORDER — METHYLPREDNISOLONE SODIUM SUCCINATE 125 MG/2ML
125 INJECTION, POWDER, LYOPHILIZED, FOR SOLUTION INTRAMUSCULAR; INTRAVENOUS
Start: 2025-03-25

## 2024-03-25 RX ORDER — HEPARIN SODIUM,PORCINE 10 UNIT/ML
5-20 VIAL (ML) INTRAVENOUS DAILY PRN
OUTPATIENT
Start: 2025-03-25

## 2024-03-25 RX ORDER — HEPARIN SODIUM (PORCINE) LOCK FLUSH IV SOLN 100 UNIT/ML 100 UNIT/ML
5 SOLUTION INTRAVENOUS
OUTPATIENT
Start: 2025-03-25

## 2024-03-25 RX ORDER — DIPHENHYDRAMINE HYDROCHLORIDE 50 MG/ML
50 INJECTION INTRAMUSCULAR; INTRAVENOUS
Start: 2025-03-25

## 2024-03-25 RX ORDER — ZOLEDRONIC ACID 5 MG/100ML
5 INJECTION, SOLUTION INTRAVENOUS ONCE
Status: COMPLETED | OUTPATIENT
Start: 2024-03-25 | End: 2024-03-25

## 2024-03-25 RX ORDER — ALBUTEROL SULFATE 90 UG/1
1-2 AEROSOL, METERED RESPIRATORY (INHALATION)
Start: 2025-03-25

## 2024-03-25 RX ORDER — MEPERIDINE HYDROCHLORIDE 25 MG/ML
25 INJECTION INTRAMUSCULAR; INTRAVENOUS; SUBCUTANEOUS EVERY 30 MIN PRN
OUTPATIENT
Start: 2025-03-25

## 2024-03-25 RX ORDER — ALBUTEROL SULFATE 0.83 MG/ML
2.5 SOLUTION RESPIRATORY (INHALATION)
OUTPATIENT
Start: 2025-03-25

## 2024-03-25 RX ADMIN — ZOLEDRONIC ACID 5 MG: 0.05 INJECTION, SOLUTION INTRAVENOUS at 10:56

## 2024-03-25 NOTE — PROGRESS NOTES
Infusion Nursing Note:  Ginger Marshall presents today for labs and Reclast.    Patient seen by provider today: No   present during visit today: Not Applicable.    Note: Confirmed patient is taking calcium and vitamin D, denies recent or upcoming dental procedures.      Intravenous Access:  Labs drawn without difficulty.  Peripheral IV placed.    Treatment Conditions:  Results reviewed, labs MET treatment parameters, ok to proceed with treatment.  Calcium 9.2 and creatinine 0.61      Post Infusion Assessment:  Patient tolerated infusion without incident.  Blood return noted pre and post infusion.  Site patent and intact, free from redness, edema or discomfort.  No evidence of extravasations.  Access discontinued per protocol.       Discharge Plan:   AVS to patient via MYCHART.    Patient discharged in stable condition accompanied by: self.  Departure Mode: Ambulatory.      Kayla Melissa RN

## 2024-03-28 ENCOUNTER — TRANSFERRED RECORDS (OUTPATIENT)
Dept: HEALTH INFORMATION MANAGEMENT | Facility: CLINIC | Age: 84
End: 2024-03-28
Payer: COMMERCIAL

## 2024-03-29 ENCOUNTER — TRANSFERRED RECORDS (OUTPATIENT)
Dept: HEALTH INFORMATION MANAGEMENT | Facility: CLINIC | Age: 84
End: 2024-03-29
Payer: COMMERCIAL

## 2024-04-02 DIAGNOSIS — F41.9 ANXIETY: ICD-10-CM

## 2024-04-02 RX ORDER — BUSPIRONE HYDROCHLORIDE 10 MG/1
10 TABLET ORAL 2 TIMES DAILY
Qty: 180 TABLET | Refills: 1 | Status: SHIPPED | OUTPATIENT
Start: 2024-04-02 | End: 2024-08-13

## 2024-04-02 ASSESSMENT — ANXIETY QUESTIONNAIRES
GAD7 TOTAL SCORE: 1
3. WORRYING TOO MUCH ABOUT DIFFERENT THINGS: NOT AT ALL
2. NOT BEING ABLE TO STOP OR CONTROL WORRYING: NOT AT ALL
5. BEING SO RESTLESS THAT IT IS HARD TO SIT STILL: NOT AT ALL
1. FEELING NERVOUS, ANXIOUS, OR ON EDGE: SEVERAL DAYS
8. IF YOU CHECKED OFF ANY PROBLEMS, HOW DIFFICULT HAVE THESE MADE IT FOR YOU TO DO YOUR WORK, TAKE CARE OF THINGS AT HOME, OR GET ALONG WITH OTHER PEOPLE?: NOT DIFFICULT AT ALL
4. TROUBLE RELAXING: NOT AT ALL
7. FEELING AFRAID AS IF SOMETHING AWFUL MIGHT HAPPEN: NOT AT ALL
7. FEELING AFRAID AS IF SOMETHING AWFUL MIGHT HAPPEN: NOT AT ALL
GAD7 TOTAL SCORE: 1
6. BECOMING EASILY ANNOYED OR IRRITABLE: NOT AT ALL
GAD7 TOTAL SCORE: 1
IF YOU CHECKED OFF ANY PROBLEMS ON THIS QUESTIONNAIRE, HOW DIFFICULT HAVE THESE PROBLEMS MADE IT FOR YOU TO DO YOUR WORK, TAKE CARE OF THINGS AT HOME, OR GET ALONG WITH OTHER PEOPLE: NOT DIFFICULT AT ALL

## 2024-04-03 ENCOUNTER — OFFICE VISIT (OUTPATIENT)
Dept: FAMILY MEDICINE | Facility: CLINIC | Age: 84
End: 2024-04-03
Payer: COMMERCIAL

## 2024-04-03 VITALS
HEIGHT: 65 IN | WEIGHT: 211 LBS | TEMPERATURE: 98.6 F | BODY MASS INDEX: 35.16 KG/M2 | OXYGEN SATURATION: 98 % | RESPIRATION RATE: 17 BRPM | HEART RATE: 82 BPM | DIASTOLIC BLOOD PRESSURE: 69 MMHG | SYSTOLIC BLOOD PRESSURE: 118 MMHG

## 2024-04-03 DIAGNOSIS — H81.11 BENIGN PAROXYSMAL POSITIONAL VERTIGO OF RIGHT EAR: Primary | ICD-10-CM

## 2024-04-03 DIAGNOSIS — M54.42 CHRONIC BILATERAL LOW BACK PAIN WITH LEFT-SIDED SCIATICA: ICD-10-CM

## 2024-04-03 DIAGNOSIS — F33.1 MODERATE EPISODE OF RECURRENT MAJOR DEPRESSIVE DISORDER (H): ICD-10-CM

## 2024-04-03 DIAGNOSIS — F41.9 ANXIETY: ICD-10-CM

## 2024-04-03 DIAGNOSIS — G89.29 CHRONIC BILATERAL LOW BACK PAIN WITH LEFT-SIDED SCIATICA: ICD-10-CM

## 2024-04-03 DIAGNOSIS — D75.89 MACROCYTOSIS WITHOUT ANEMIA: ICD-10-CM

## 2024-04-03 PROBLEM — S52.021D CLOSED FRACTURE OF OLECRANON PROCESS OF RIGHT ULNA WITH ROUTINE HEALING: Status: RESOLVED | Noted: 2023-02-11 | Resolved: 2024-04-03

## 2024-04-03 PROBLEM — S52.202A: Status: RESOLVED | Noted: 2023-02-11 | Resolved: 2024-04-03

## 2024-04-03 PROBLEM — S52.572A OTHER CLOSED INTRA-ARTICULAR FRACTURE OF DISTAL END OF LEFT RADIUS, INITIAL ENCOUNTER: Status: RESOLVED | Noted: 2023-02-11 | Resolved: 2024-04-03

## 2024-04-03 PROCEDURE — 99215 OFFICE O/P EST HI 40 MIN: CPT | Performed by: STUDENT IN AN ORGANIZED HEALTH CARE EDUCATION/TRAINING PROGRAM

## 2024-04-03 PROCEDURE — 96127 BRIEF EMOTIONAL/BEHAV ASSMT: CPT | Performed by: STUDENT IN AN ORGANIZED HEALTH CARE EDUCATION/TRAINING PROGRAM

## 2024-04-03 RX ORDER — RESPIRATORY SYNCYTIAL VIRUS VACCINE 120MCG/0.5
0.5 KIT INTRAMUSCULAR ONCE
Qty: 1 EACH | Refills: 0 | Status: CANCELLED | OUTPATIENT
Start: 2024-04-03 | End: 2024-04-03

## 2024-04-03 RX ORDER — SERTRALINE HYDROCHLORIDE 100 MG/1
100 TABLET, FILM COATED ORAL DAILY
Qty: 90 TABLET | Refills: 1 | Status: SHIPPED | OUTPATIENT
Start: 2024-04-03 | End: 2024-09-18

## 2024-04-03 NOTE — PROGRESS NOTES
Assessment & Plan     Benign paroxysmal positional vertigo of right ear  S/p Epley's maneuver today. Will refer to PT for recurrence.   - Physical Therapy  Referral    Moderate episode of recurrent major depressive disorder (H)  Anxiety  PHQ of 3, SPRING of 1. Stable on 100mg sertraline. Seeing therapist regularly. Ok to refill for 6 months.  - sertraline (ZOLOFT) 100 MG tablet  Dispense: 90 tablet; Refill: 1    Macrocytosis without anemia  Longstanding hx of DENY (low normal MCV) with normal subsequent EGD (neg H pylori)/colonoscopy in past (2011).     However, most recent CBC with normal hemoglobin but new uptrending MCV over several years in association with low normal vitamin B12 level. Has since restarted B12 supplement. Plan to repeat CBC, B12, ferritin, folate, peripheral smear in 3 months. Lab only visit scheduled.    Chronic bilateral low back pain with left-sided sciatica  Following with pain clinic, was seen through [facility] in past with multiple LESIs. Per last visit through  chronic pain clinic, last lumbar MRI in 2021 revealing severe DDD and moderate central stenosis. Does have decreased R hip flexion strength (4/5). Already completed at least 3 months of conservative treatments. Face-to-face visit today for lift chair and adjustable bed.  - Lift Chair W Seat Lift Mechanism Order for DME - ONLY FOR DME    - Miscellaneous Order for DME - (Use only if a more specific DME order does not already exist   - Physical Therapy  Referral    40 minutes spent on precharting, visit and completing Epley's maneuver, and documentation    Follow up in 6 months for mood     Duy Leyva MD  [facility]  4/8/2024    Alejandro Miles is a 83 year old, presenting for the following health issues:  MH Follow Up (Four injections in back in the last month and is still hurting. Is concerned about balance and vertigo. Stopped going to physical therapy b/c back hurt so bad. Would consider going  back to PT.)        4/3/2024     1:15 PM   Additional Questions   Roomed by [place]     History of Present Illness       Reason for visit:  To discuss need for medical equipment.    She eats 2-3 servings of fruits and vegetables daily.She consumes 0 sweetened beverage(s) daily. She exercises with enough effort to increase her heart rate 7 days per week.   She is taking medications regularly.     Depression and Anxiety   How are you doing with your depression since your last visit? No change  How are you doing with your anxiety since your last visit?  No change    Social History     Tobacco Use    Smoking status: Former     Packs/day: 1.00     Years: 41.00     Additional pack years: 0.00     Total pack years: 41.00     Types: Cigarettes     Quit date: 1999     Years since quittin.2    Smokeless tobacco: Never    Tobacco comments:     former smoker   Vaping Use    Vaping Use: Never used   Substance Use Topics    Alcohol use: Yes     Comment: Occasionally,  usually wine    Drug use: No         2024    10:13 AM 3/19/2024    10:05 AM 2024     9:02 PM   PHQ   PHQ-9 Total Score 12 8 3   Q9: Thoughts of better off dead/self-harm past 2 weeks Not at all Not at all Not at all         2023     2:31 PM 2024    10:14 AM 2024     9:05 PM   SPRING-7 SCORE   Total Score  5 (mild anxiety) 1 (minimal anxiety)   Total Score 3 5 1     Mood  Switched to sertraline and this must have been helpful.  Feels better when it comes to mood now.   Still seeing therapist too which is helpful.   She feels she is in a good place regarding mood and would like to continue current dosage.    Vertigo  Recurring now. Stopped vertigo treatment due to interfering back pain.  This was helpful in the past.   Only occurring with head movements and laying down/getting up.  No vertigo at rest at all.     DME items  Requesting lift chair and adjustable bed.  Her arthritis and back pain is limiting her ability to get out of her  "chairs/furniture at home.  Her furniture is so low for her that it takes her a minute to get up and out.   Relates most of the problems to be located to chronic back pain, see below  Is on a program with [facility] as well.     Vitamin B12   Has been on B12 since last time seen by me.   Is on this daily, 500mcg.    Chronic pain  Now seeing [facility] pain clinic.   Recently saw team and received back injection yesterday.         Objective    /69   Pulse 82   Temp 98.6  F (37  C)   Resp 17   Ht 1.651 m (5' 5\")   Wt 95.7 kg (211 lb)   SpO2 98%   BMI 35.11 kg/m    Body mass index is 35.11 kg/m .    Physical Exam   GENERAL: healthy, alert and no distress  HEAD: Normocephalic, atraumatic.   RESP: lungs clear to auscultation - no rales, rhonchi or wheezes  CV: regular rate and rhythm, normal S1 S2, no murmur, click, rub or gallop.  No peripheral swelling noted.   MSK: no gross musculoskeletal defects noted.  SKIN: no suspicious lesions or rashes.  EXT: Warm and well perfused.   NEURO: CNII-XII grossly intact. No focal deficits. Right Cris-Hallpike positive.  PSYCH: Groomed, dressed appropriately for weather. Logical, linear thought process. Normal mood with consistent affect.     Signed Electronically by: Duy Leyva MD    "

## 2024-04-04 ENCOUNTER — VIRTUAL VISIT (OUTPATIENT)
Dept: RHEUMATOLOGY | Facility: CLINIC | Age: 84
End: 2024-04-04
Payer: COMMERCIAL

## 2024-04-04 DIAGNOSIS — M81.0 OSTEOPOROSIS, UNSPECIFIED OSTEOPOROSIS TYPE, UNSPECIFIED PATHOLOGICAL FRACTURE PRESENCE: ICD-10-CM

## 2024-04-04 DIAGNOSIS — Z86.19 HISTORY OF SHINGLES: ICD-10-CM

## 2024-04-04 DIAGNOSIS — M05.79 RHEUMATOID ARTHRITIS INVOLVING MULTIPLE SITES WITH POSITIVE RHEUMATOID FACTOR (H): Primary | ICD-10-CM

## 2024-04-04 PROCEDURE — G2211 COMPLEX E/M VISIT ADD ON: HCPCS | Mod: 95 | Performed by: INTERNAL MEDICINE

## 2024-04-04 PROCEDURE — 99214 OFFICE O/P EST MOD 30 MIN: CPT | Mod: 95 | Performed by: INTERNAL MEDICINE

## 2024-04-04 NOTE — PROGRESS NOTES
Rheumatology Clinic Visit      Ginger Marshall MRN# [id number]   YOB: 1940 Age: 83 year old      Date of visit: 4/04/24   PCP: Dr. Duy Leyva    Chief Complaint   Patient presents with:  RECHECK: RA    Assessment and Plan     1. Seropositive Erosive Rheumatoid Arthritis ( [2009], CCP >100 [2009]; hx of rheumatoid nodule by 6/14/2011 pathology): Previously failed remicade (staph infection during therapy, but was immediately after a joint injection), orencia (migraines), HCQ (ineffective), Xeljanz (partially effective).  Sulfa allergy.  Currently on methotrexate 25 mg SQ once weekly (dose reduction in the past resulted in more symptoms), leucovorin 5 mg weekly (resolved nasal sore that didn't improve with higher daily folic acid doses), and Rinvoq 15mg daily.  RA well controlled since changing to Rinvoq.  Patient requested a sooner appointment to discuss potential treatment changes because of history of recurrent shingles.  She has not had recurrent shingles since taking daily Valtrex.  We again reviewed the risks associated with DMARD, and in more detail with Rinvoq; after thorough discussion with different options proposed she has decided that she would like to remain on Rinvoq because she is doing well now and does not want to risk having any return of joint pain.  In the future, if changes needed, she will consider changing to Simponi IV or SQ.  Chronic illness.    - Continue methotrexate 25mg SQ once weekly (Rasuvo)  - Continue leucovorin 5 mg every 7 days, 24 hours after MTX dose.   - Continue Rinvoq 15 mg daily  - Labs every 3 months: CBC, Creatinine, Hepatic Panel, ESR, CRP    High risk medication requiring intensive toxicity monitoring at least quarterly    2. 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  Doing okay at this time.     3. Right hip pain: Status post WALTER twice in the past. Followed by [facility] orthopedics as  needed.  Symptoms are degenerative in nature.  Reportedly doing well at this time per patient    4. 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 Michele who is now at [facility].  Has been seen at [facility] and [place] [facility].  She previously reported that a TENS unit was helpful at one point, then not helpful.   She does not want additional lumbar spine injections or back surgery.  However, she says now that she is reconsidering and would like to see the pain clinic for evaluation.  Alternatively she could see her spine surgeon but she does not wish to do so at this time.    5.  Thoracic back pain: Degenerative in nature.  And degenerative changes seen on 2021 chest CT.  Continue physical therapy exercises at home, as these are helpful.    6. Osteoporosis: was previously managed by endocrinology and she received reclast once in 2013, 2014, 2016. 12/12/2018 DEXA ordered by Dr. Vázquez showed osteoporosis.  Repeat DEXA on 2/2/2022 showed osteoporosis; no significant change of the hips; forearm osteoporosis but no previous evaluation of the forearm for comparison.  Reclast was restarted after sufficient drug holiday, with the first dose on 3/18/2021; again received in 3/21/2022, 3/21/2023, 3/25/2024.  DEXA being performed later today.  Chronic illness, stable.      - Reclast once yearly; next due on 3/25/2025 or later  - Continue vitamin D 1000 IU daily  - Continue calcium 1200mg daily from supplement and dietary sources    7. Left 1st toe pain, history: 2 episodes of sudden onset left toe pain followed by diffuse left foot pain that made ambulation difficult.  Also with nodules over both Achilles tendons and the right elbow that are either rheumatoid nodules or tophi.  She reports having history of gout decades ago.  Denies ever being on colchicine or allopurinol.  Discussed colchicine to use if suspected gout flare occurs.  No flares since  last seen so has not used colchicine.  - Colchicine: (MITIGARE) 0.6 MG capsule; Take 2 capsules (1.2 mg) by mouth once for 1 dose at the onset of a gout flare, followed by 1 capsule (0.6mg) 1 hour later.      8. Chronic pain syndrome: Documented here for historical significance only.   Management per pain clinic and/or PCP    9.  Vaccinations: Vaccinations reviewed with Ms. Marshall.  Risks and benefits of vaccinations were discussed.    - Influenza: encouraged yearly vaccination, and to hold methotrexate for 1-2 weeks afterward  - [id number]: up to date  - [id number]: up to date  - Shingrix: Up to date   - COVID-19: Advised keeping updated, and to hold methotrexate and Rinvoq for 1-2 weeks afterward.  - RSV: Advised vaccination    10.  Nasal sores: Only occurs between December-March each year.  Reports using a humidifier on her CPAP.  Has seen ENT but was determined that it was likely vasomotor rhinitis with constant nasal drip that irritates the nose.  She is advised by ENT to use a humidifier at night and to use Atrovent nasal spray.  She will follow with ENT as needed for this issue.    Total minutes spent in evaluation with patient, documentation, , and review of pertinent studies and chart notes: 18  The longitudinal plan of care for the rheumatology problem(s) were addressed during this visit.  Due to added complexity of care, we will continue to support the patient and the subsequent management of this condition with ongoing continuity of care.        Ms. Marshall verbalized agreement with and understanding of the rational for the diagnosis and treatment plan.  All questions were answered to best of my ability and the patient's satisfaction. Ms. Marshall was advised to contact the clinic with any questions that may arise after the clinic visit.      Thank you for involving me in the care of the patient    Return to clinic: May, as previously scheduled    VANE Marshall is a 83 year old female with  a history of multiple foot surgeries, hand tendon transfers, MCP replacements (most recent being in August 2015), bilateral TKA, right WALTER, left distal radius fracture history, gout, s/p lumbar fusion, obstructive sleep apnea and uses a CPAP, osteoporosis and seropositive (RF+, CCP+) erosive rheumatoid arthritis who presents for follow-up of rheumatoid arthritis.      2/6/2024: RA controlled.  Nasal sores each year between December-March.  Uses a humidifier on her CPAP.  Has not noticed any change with methotrexate use.  No oral sores.  Chronic low back pain that radiates to the left leg; she would like to be evaluated in the pain clinic; she does not want to return to the spine surgeon because she does not want surgery.  He does not want to try reducing methotrexate because she recalls having worsening joint symptoms with dose reduction in the past.    Today, 4/4/2024: Here sooner than previously scheduled because she would like to discuss alternatives to Rinvoq considering history of recurrent shingles.  She is now taking Valtrex daily for suppressive benefits and has not had shingles since being on Valtrex.  She is not interested in changing medications at this time because she does not want to risk potentially having worsening joint pain; she says that her RA is well-controlled at this time.    Denies fevers, chills, nausea, vomiting, constipation, diarrhea. No abdominal pain. No chest pain/pressure, palpitations, or shortness of breath.     Tobacco: quit in 1999  EtOH: 1 glass of wine every month at most  Drugs: None  Occupation: , retired     ROS   12 point review of system was completed and negative except as noted in the HPI     Active Problem List     Patient Active Problem List   Diagnosis    Rheumatoid arthritis involving right hand with positive rheumatoid factor (H)    History of colonic polyps    Multinodular goiter    Pulmonary nodule    PSEUDOPHAKIA OU    PVD (POSTERIOR  VITREOUS DETACHMENT) OU    PXF (PSEUDOEXFOLIATION OF LENS CAPSULE) OD    GERD (gastroesophageal reflux disease)    Hyperlipidemia LDL goal <100    Advance Care Planning    Neuropathy    SHERRY (obstructive sleep apnea)-severe (AHI 35)    Anemia of chronic disease    Osteoporosis    Cornea guttata, ou    Conjunctival concretions    Stenosis, spinal, lumbar    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    MGD (meibomian gland dysfunction)    Prediabetes    Chronic bilateral low back pain without sciatica    Allergic state, subsequent encounter    Anxiety    DDD (degenerative disc disease), lumbar    Chronic right shoulder pain    Moderate episode of recurrent major depressive disorder (H)    Rheumatic mitral stenosis    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    History of bisphosphonate therapy    Morbid obesity (H)    Immunocompromised (H24)    Thoracic spine pain    Herpes simplex vulvovaginitis     Past Medical History     Past Medical History:   Diagnosis Date    Acute posthemorrhagic anemia 10/13/2012    Closed fracture of multiple ribs of left side, initial encounter 11/25/2019    Closed fracture of olecranon process of right ulna with routine healing 02/11/2023    Closed fracture of shaft of left ulna, unspecified fracture morphology, initial encounter 02/11/2023    COPD (chronic obstructive pulmonary disease) (H) 1980's    no longer occurring    Depressive disorder     Ex-smoker 01/1999    Gastroenteritis 03/21/2020    Gastroenteritis with norovirus    Herniated nucleus pulposus, L3-4 03/01/2017    Hip joint replacement by other means 07/10/2008    History of blood transfusion     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    History of total knee replacement 07/23/2009    Menopause 1989    late 40's    Migraine 04/27/2014    resolved    Multinodular goiter     Other chronic pain     joints    Other closed intra-articular fracture of distal end of left radius, initial  encounter 2023    Pelvic fracture (H) 2014    Rheumatic mitral stenosis     Rheumatoid arteritis (H)     S/P lumbar fusion 2017    Sleep apnea     Vitamin B12 deficiency      Past Surgical History     Past Surgical History:   Procedure Laterality Date    ABDOMEN SURGERY      c sections    ARTHROSCOPY KNEE Left     ARTHROSCOPY KNEE RT/LT  2006    left    BACK SURGERY  2013    disc    BIOPSY BREAST      BREAST SURGERY      lumpectomy 's    BREAST SURGERY      lumpectomy    CATARACT EXTRACTION Bilateral     CATARACT IOL, RT/LT Bilateral 2010 aproximately     SECTION  1966     SECTION  1972    COLONOSCOPY  2009,    COLONOSCOPY      FINGER SURGERY Right 2019    Procedure: DISTAL ULNA RESECTION, RIGHT MIDDLE SILASTIC METACARPALPHALANGEAL EXCHANGE AND RIGHT ELBOW NODULE EXCISION.;  Surgeon: Hilario Redmond MD;  Location: [facility] OR;  Service: Orthopedics    FOOT SURGERY Left     GYN SURGERY  [age]    HAND SURGERY Right     HAND SURGERY Right     HC ESOPH/GAS REFLUX TEST W NASAL IMPED >1 HR  2012    Procedure:ESOPHAGEAL IMPEDENCE FUNCTION TEST WITH 24 HOUR PH GREATER THAN 1 HOUR; Surgeon:KAYKAY JULIO; Location:[facility]    IR LUMBAR EPIDURAL STEROID INJECTION      IR TRANSLAMINAR EPIDURAL LUMBAR INJ INCL IMAGING  2012    LESI L5-S1 at [facility]    LUMBAR FUSION      OPEN REDUCTION INTERNAL FIXATION ELBOW Right 2023    Procedure: OPEN REDUCTION INTERNAL FIXATION, RIGHT OLECRANON FRACTURE;  Surgeon: Pili Brock MD;  Location:  OR    OPEN REDUCTION INTERNAL FIXATION WRIST Left 2023    Procedure: OPEN REDUCTION INTERNAL FIXATION, LEFT DISTAL RADIUS FRACTURE;  Surgeon: Pili Brock MD;  Location:  OR    OPTICAL TRACKING SYSTEM FUSION POSTERIOR SPINE LUMBAR N/A 2017    Procedure: OPTICAL TRACKING SYSTEM FUSION SPINE POSTERIOR LUMBAR ONE LEVEL;  Surgeon: Anthony Michele MD;  Location:  OR     "ORTHOPEDIC SURGERY  1991    left foot surgery    ORTHOPEDIC SURGERY  1995    R MCP surgery    ORTHOPEDIC SURGERY  2007    right knee total replacement    TOTAL HIP ARTHROPLASTY Right     TOTAL KNEE ARTHROPLASTY Left     TOTAL KNEE ARTHROPLASTY Right     ZZC ANESTH,TOTAL HIP ARTHROPLASTY  2010    Rt hip    ZZC HAND/FINGER SURGERY UNLISTED  11/2005    right hand    ZZC TOTAL KNEE ARTHROPLASTY  2006    left     Allergy     Allergies   Allergen Reactions    Abatacept Other (See Comments)     Severe headaches  Migraine    Celebrex [Roche-2 Inhibitors]      Ineffective    Celecoxib Unknown and Nausea    Levaquin [Levofloxacin] Fatigue     Severe body pain    Orencia [Abatacept]      Headache    Septra [Bactrim]     Sulfa Antibiotics Nausea and Vomiting     \"deathly ill\"  Allergic to everything with Sulfa in it.    Sulfamethoxazole-Trimethoprim Nausea and Nausea and Vomiting    Tramadol Other (See Comments)     Headache  Migraine headache    Valdecoxib Unknown and Nausea     Made me \"very very ill\", might of been \"cramping\"    Adhesive Tape Rash     Plastic tape  Plastic tapes    Antihistamines, Chlorpheniramine-Type  [Antihistamines, Chlorpheniramine-Type] Anxiety and Other (See Comments)     Current Medication List     Current Outpatient Medications   Medication Sig Dispense Refill    acetaminophen (TYLENOL) 325 MG tablet Take 2 tablets (650 mg) by mouth every 6 hours as needed for mild pain Max tylenol 3000 mg in 24 hours      albuterol (PROAIR HFA/PROVENTIL HFA/VENTOLIN HFA) 108 (90 Base) MCG/ACT inhaler Inhale 2 puffs into the lungs every 6 hours as needed for shortness of breath, wheezing or cough      atorvastatin (LIPITOR) 40 MG tablet Take 1 tablet (40 mg) by mouth daily 90 tablet 3    busPIRone (BUSPAR) 10 MG tablet TAKE ONE TABLET BY MOUTH TWICE A  tablet 1    CALCIUM CITRATE PO Take 300 mg by mouth daily Take with meal that contains least amount of calcium      carboxymethylcellulose PF (REFRESH PLUS) 0.5 " % ophthalmic solution Place 1 drop into both eyes 2 times daily as needed for dry eyes      Cholecalciferol (VITAMIN D-3 PO) Take 2,000 Units by mouth daily      Ferrous Gluconate 324 (37.5 Fe) MG TABS Take 1 tablet by mouth daily Started 6/2023      fluticasone (FLONASE) 50 MCG/ACT nasal spray Spray 1 spray into both nostrils daily 18.2 mL 0    hydrocortisone 2.5 % cream Apply topically 2 times daily For up to two weeks 20 g 0    ipratropium (ATROVENT) 0.06 % nasal spray Spray 2 sprays into both nostrils 4 times daily as needed for rhinitis (nasal drainage) 15 mL 11    leucovorin (WELLCOVORIN) 5 MG tablet Take 1 tablet (5 mg) by mouth every 7 days . Take 24 hours after taking Methotrexate each week. 13 tablet 2    loratadine (CLARITIN) 10 MG tablet Take 1 tablet (10 mg) by mouth 2 times daily      Methotrexate, PF, (RASUVO) 25 MG/0.5ML autoinjector Inject 0.5 mLs (25 mg) Subcutaneous every 7 days . Hold for signs of infection, and seek medical attention. Take every Thursday. 2 mL 6    naproxen sodium (ANAPROX) 220 MG tablet Take 2 tablets (440 mg) by mouth daily as needed for moderate pain (4-6) (less than monthly use currently) (Patient taking differently: Take 440 mg by mouth daily as needed for moderate pain (using a couple times a month))      olopatadine (PATADAY) 0.2 % ophthalmic solution Place 1 drop into both eyes daily as needed Uses mostly in Spring      pantoprazole (PROTONIX) 40 MG EC tablet Take 1 tablet (40 mg) by mouth every morning (before breakfast) 90 tablet 3    sertraline (ZOLOFT) 100 MG tablet Take 1 tablet (100 mg) by mouth daily 90 tablet 1    UNABLE TO FIND MEDICATION NAME: Distilled water for CPAP      upadacitinib ER (RINVOQ) 15 MG tablet Take 1 tablet (15 mg) by mouth daily . Hold for any infection and seek medical attention. 30 tablet 6    valACYclovir (VALTREX) 500 MG tablet Take 1 tablet (500 mg) by mouth 2 times daily 180 tablet 1    vitamin C (ASCORBIC ACID) 1000 MG TABS Take 1  "tablet (1,000 mg) by mouth daily      zoledronic Acid (RECLAST) 5 MG/100ML SOLN infusion Inject 5 mg into the vein once Every year (next dose 2023)       No current facility-administered medications for this visit.     Social History   See HPI    Family History     Family History   Problem Relation Age of Onset    Arthritis Mother     Alzheimer Disease Mother     Hyperlipidemia Mother     Osteoporosis Mother     Heart Disease Father         MI ( from this)    Alcohol/Drug Father     Arthritis Sister     Hypertension Sister     Other Cancer Sister         Luekemia    Neurologic Disorder Sister         Schizophrenic    Hypertension Sister             Hyperlipidemia Sister     Mental Illness Sister         Bipolar    Glaucoma Daughter     Diabetes Son         type 1,     Diabetes Son         ,  cancer    Esophageal Cancer Son         Drank and smoked    Substance Abuse Son     Diabetes Other         type 2    Hyperlipidemia Other      No change in family history since the previous clinic visit.    Physical Exam     Temp Readings from Last 3 Encounters:   24 98.6  F (37  C)   24 98.6  F (37  C) (Oral)   24 97.4  F (36.3  C) (Oral)     BP Readings from Last 5 Encounters:   24 118/69   24 122/66   24 (!) 149/84   24 132/77   24 132/68     Pulse Readings from Last 1 Encounters:   24 82     Resp Readings from Last 1 Encounters:   24 17     Estimated body mass index is 35.11 kg/m  as calculated from the following:    Height as of 4/3/24: 1.651 m (5' 5\").    Weight as of 4/3/24: 95.7 kg (211 lb).        GEN: NAD.   HEENT: MMM.  Anicteric, noninjected sclera. No obvious external lesions of the ear and nose. Hearing intact.  PULM: No increased work of breathing  SKIN: No rash or jaundice seen  PSYCH: Alert. Appropriate.      Labs     CBC  Recent Labs   Lab Test 24  1036 23  1413 23  1005 21  1054 05/10/21  1023 " 02/16/21  1019 11/16/20  1028   WBC 5.3 4.3 4.2   < > 5.0 5.7 5.7   RBC 3.65* 3.53* 3.85   < > 3.39* 3.81 3.91   HGB 12.2 11.9 11.4*   < > 11.3* 12.5 12.0   HCT 37.6 36.0 35.5   < > 35.6 38.9 37.8   * 102* 92   < > 105* 102* 97   RDW 14.2 14.7 19.0*   < > 14.3 15.2* 16.1*    379 335   < > 333 335 361   MCH 33.4* 33.7* 29.6   < > 33.3* 32.8 30.7   MCHC 32.4 33.1 32.1   < > 31.7 32.1 31.7   NEUTROPHIL 60 52 57   < > 57.7 65.8 60.5   LYMPH 21 28 25   < > 18.5 17.0 20.4   MONOCYTE 15 17 14   < > 17.3 12.1 12.8   EOSINOPHIL 4 2 3   < > 6.3 4.9 6.1   BASOPHIL 0 0 0   < > 0.2 0.2 0.2   ANEU  --   --   --   --  2.9 3.8 3.5   ALYM  --   --   --   --  0.9 1.0 1.2   MARYURI  --   --   --   --  0.9 0.7 0.7   AEOS  --   --   --   --  0.3 0.3 0.4   ABAS  --   --   --   --  0.0 0.0 0.0   ANEUTAUTO 3.2 2.2 2.4   < >  --   --   --    ALYMPAUTO 1.1 1.2 1.0   < >  --   --   --    AMONOAUTO 0.8 0.7 0.6   < >  --   --   --    AEOSAUTO 0.2 0.1 0.1   < >  --   --   --    ABSBASO 0.0 0.0 0.0   < >  --   --   --     < > = values in this interval not displayed.     CMP  Recent Labs   Lab Test 03/25/24  1018 02/06/24  1036 12/05/23  1413 08/22/23  1005 06/20/23  1329 04/25/23  1008 02/22/23  1018 02/12/23  1633 08/18/21  1054 05/10/21  1023 03/18/21  1350 02/16/21  1019 12/18/20  0923 11/16/20  1028   NA  --   --   --   --  142  --  142 136   < >  --   --   --   --   --    POTASSIUM  --   --   --   --  3.5  --  4.0 3.8   < >  --   --   --   --   --    CHLORIDE  --   --   --   --  105  --  103 102   < >  --   --   --   --   --    CO2  --   --   --   --  24  --  25 25   < >  --   --   --   --   --    ANIONGAP  --   --   --   --  13  --  14 9   < >  --   --   --   --   --    GLC  --   --   --   --  123*  --  104* 159*   < >  --   --   --   --   --    BUN  --   --   --   --  9.6  --  11.0 10.3   < >  --   --   --   --   --    CR 0.61 0.55 0.55 0.63 0.60 0.60 0.52 0.52   < > 0.59  --  0.62  --  0.60   GFRESTIMATED 89 >90 >90 89 90 90  >90 >90   < > 87  --  86  --  87   GFRESTBLACK  --   --   --   --   --   --   --   --   --  >90  --  >90  --  >90   BRANDEE 9.2  --   --   --  9.8 9.8 9.4 8.6*   < >  --    < >  --    < >  --    BILITOTAL  --  0.3 0.3 0.4 0.3 0.2  --   --    < > 0.3  --  0.4  --  0.4   ALBUMIN  --  4.5 4.4 4.6 4.6 4.5  --   --    < > 3.6  --  4.2  --  4.1   PROTTOTAL  --  7.6 7.5 7.8 8.1 7.7  --   --    < > 7.0  --  8.0  --  7.6   ALKPHOS  --  40 47 37 50 53  --   --    < > 45  --  44  --  47   AST  --  26 37 30 29 29  --   --    < > 22  --  21  --  25   ALT  --  20 29 21 21 22  --   --    < > 36  --  34  --  36    < > = values in this interval not displayed.     Calcium/VitaminD  Recent Labs   Lab Test 03/25/24  1018 06/20/23  1329 06/05/23  0941 04/25/23  1008 02/11/23  0819 01/16/23  1122   BRANDEE 9.2 9.8  --  9.8   < > 9.2   VITDT  --   --  60 66  --  95*    < > = values in this interval not displayed.     ESR/CRP  Recent Labs   Lab Test 02/06/24  1036 08/22/23  1005 04/25/23  1008 07/12/22  1112 04/29/22  1000 02/04/22  0832 12/08/21  1053   SED 15 16 30   < > 15 14 16   CRP  --   --   --   --  <2.9 <2.9 <2.9   CRPI <3.00 <3.00 <3.00   < >  --   --   --     < > = values in this interval not displayed.     Lipid Panel  Recent Labs   Lab Test 01/29/24  1031 01/16/23  1122 12/08/21  1053   CHOL 210* 185 184   TRIG 133 124 101   HDL 78 74 77   * 86 87   NHDL 132* 111 107     Hepatitis B  Recent Labs   Lab Test 10/20/22  1256   HBCAB Nonreactive   HEPBANG Nonreactive     Hepatitis C  Recent Labs   Lab Test 10/20/22  1256   HCVAB Nonreactive       Tuberculosis Screening  Recent Labs   Lab Test 02/06/24  1036 02/16/23  0000 10/20/22  1256 08/18/21  1054 05/07/18  1033   TBRES Negative Indeterminate* Negative   < >  --    TBRSLT  --   --   --   --  Negative   TBAGN  --   --   --   --  0.00    < > = values in this interval not displayed.     Immunization History     Immunization History   Administered Date(s) Administered    COVID-19  MONOVALENT 12+ (Pfizer) 02/09/2021, 03/03/2021    Flu, Unspecified 10/20/2013    Influenza (High Dose) 3 valent vaccine 10/28/2013, 09/23/2014, 11/11/2015, 09/23/2016, 09/24/2019    Influenza (IIV3) PF 10/12/1999, 10/26/2001, 10/19/2002, 10/30/2003, 10/28/2004, 10/21/2005, 10/26/2007, 10/21/2009, 10/05/2011, 10/13/2012    Influenza Vaccine (Flucelvax Quadrivalent) 09/28/2017, 09/27/2018    Influenza Vaccine 65+ (Fluzone HD) 10/07/2021, 11/16/2022, 12/11/2023    Influenza Vaccine >6 months,quad, PF 09/24/2020    Influenza Vaccine, 6+MO IM (QUADRIVALENT W/PRESERVATIVES) 11/16/2022    Influenza, seasonal, injectable, PF 10/24/2006, 11/14/2007, 10/22/2008, 10/21/2009    Mantoux Tuberculin Skin Test 11/03/2006    Pneumo Conj 13-V (2010&after) 07/29/2015    Pneumococcal 23 valent 06/26/2000, 10/26/2001, 06/01/2007, 06/02/2010    TD,PF 7+ (Tenivac) 12/10/2008, 07/20/2009    Tdap (Adult) Unspecified Formulation 12/12/2018    Zoster recombinant adjuvanted (SHINGRIX) 12/12/2018, 02/14/2019          Chart documentation done in part with Dragon Voice recognition Software. Although reviewed after completion, some word and grammatical error may remain.      Video-Visit Details    Type of service:  Video Visit    Video Start Time: 12:44 PM    Video End Time:12:57 PM    Originating Location (pt. Location): Home, [place]    Distant Location (provider location):  [email or web address]    Platform used for Video Visit: Gordo Byers MD

## 2024-04-08 ENCOUNTER — TELEPHONE (OUTPATIENT)
Dept: FAMILY MEDICINE | Facility: CLINIC | Age: 84
End: 2024-04-08

## 2024-04-08 ENCOUNTER — THERAPY VISIT (OUTPATIENT)
Dept: PHYSICAL THERAPY | Facility: CLINIC | Age: 84
End: 2024-04-08
Payer: COMMERCIAL

## 2024-04-08 DIAGNOSIS — G89.29 CHRONIC BILATERAL LOW BACK PAIN WITHOUT SCIATICA: Primary | Chronic | ICD-10-CM

## 2024-04-08 DIAGNOSIS — M54.50 CHRONIC BILATERAL LOW BACK PAIN WITHOUT SCIATICA: Primary | Chronic | ICD-10-CM

## 2024-04-08 PROCEDURE — 97161 PT EVAL LOW COMPLEX 20 MIN: CPT | Mod: GP | Performed by: PHYSICAL THERAPIST

## 2024-04-08 PROCEDURE — 97110 THERAPEUTIC EXERCISES: CPT | Mod: GP | Performed by: PHYSICAL THERAPIST

## 2024-04-08 NOTE — TELEPHONE ENCOUNTER
Ordered lift chair and adjustable bed through face-to-face visit last week.    Please fax DME orders.    Thanks,    Duy Leyva MD  [facility], [place]  4/8/2024

## 2024-04-08 NOTE — PROGRESS NOTES
"PHYSICAL THERAPY EVALUATION  Type of Visit: Evaluation    See electronic medical record for Abuse and Falls Screening details.    Subjective Pt. complains of bilateral LBP that has been present for years.  Mechanism/History of injury/symptoms: unknown.  History of multiple lumbar surgeries including \"laminectomy and ousmane placement\".   Patient s chief complaints: bilateral LBP with intermittent pain into buttock.  Symptoms are exacerbated by sitting, standing walking.  Symptoms are relieved by  nothing but has had relief with hydrocodone.   Current function restrictions:  standing for less that 5 min, sitting for 15 min.  Previous functional status as reported by patient: chronic history of LBP for greater than 10 years.  .        Presenting condition or subjective complaint: Low back pain,  sometimes radiates down left leg  and right hip pain.  Date of onset: 10/08/23    Relevant medical history: Implanted device; Osteoarthritis; Overweight; Pain at night or rest; Rheumatoid arthritis; Sleep disorder like apnea   Dates & types of surgery:      Prior diagnostic imaging/testing results: MRI     Prior therapy history for the same diagnosis, illness or injury: No      Prior Level of Function  Transfers: Independent  Ambulation: Independent  ADL: Independent  IADL:     Living Environment  Social support: Alone   Type of home: Apartment/condo   Stairs to enter the home: No       Ramp: No   Stairs inside the home: No       Help at home: None  Equipment owned: Four-point cane; Walker     Employment: Not Applicable    Hobbies/Interests: Reading    Patient goals for therapy: Walk and not have pain    Pain assessment:  8/10 at worst      Objective   Stenotic tendencies noted  Lumbar range of motion: Flexion 75% with endrange tightness, extension less than 25% with endrange pain, right and left sidebending less than 50% with endrange tightness  Hypomobile lumbar spine consistent with ousmane placement  Core strength 3 out of " 5  Decreased symptoms with repeated flexion in sitting and standing    Assessment & Plan   CLINICAL IMPRESSIONS  Medical Diagnosis: LBP    Treatment Diagnosis: LBP   Impression/Assessment: Patient is a 83 year old female with LBP complaints.  The following significant findings have been identified: Pain, Decreased ROM/flexibility, Decreased joint mobility, Decreased strength, Impaired muscle performance, and Decreased activity tolerance. These impairments interfere with their ability to perform self care tasks, work tasks, and recreational activities as compared to previous level of function.     Clinical Decision Making (Complexity):  Clinical Presentation: Stable/Uncomplicated  Clinical Presentation Rationale: based on medical and personal factors listed in PT evaluation  Clinical Decision Making (Complexity): Low complexity    PLAN OF CARE  Treatment Interventions:  Interventions: Manual Therapy, Neuromuscular Re-education, Therapeutic Activity, Therapeutic Exercise    Long Term Goals     PT Goal 1  Goal Description: Pt will be able to tolerate standing for 30minutes  Rationale: to maximize safety and independence with performance of ADLs and functional tasks;to maximize safety and independence within the home;to maximize safety and independence within the community  Target Date: 05/20/24      Frequency of Treatment: 1 x week  Duration of Treatment: 8 week    Recommended Referrals to Other Professionals:   Education Assessment:   Learner/Method: Patient;Listening;Demonstration;Pictures/Video  Education Comments: Pt educated on plan of care    Risks and benefits of evaluation/treatment have been explained.   Patient/Family/caregiver agrees with Plan of Care.     Evaluation Time:     PT Eval, Low Complexity Minutes (06615): 12       Signing Clinician: Dom Ontiveros PT      [facility]                                                                                   OUTPATIENT  PHYSICAL THERAPY      PLAN OF TREATMENT FOR OUTPATIENT REHABILITATION   Patient's Last Name, First Name, Ginger Vu YOB: 1940   Provider's Name   [facility]   Medical Record No.  [id number]     Onset Date: 10/08/23  Start of Care Date: 04/08/24     Medical Diagnosis:  LBP      PT Treatment Diagnosis:  LBP Plan of Treatment  Frequency/Duration: 1 x week/ 8 week    Certification date from 04/08/24 to 06/03/24         See note for plan of treatment details and functional goals     Dom Ontiveros, PT                         I CERTIFY THE NEED FOR THESE SERVICES FURNISHED UNDER        THIS PLAN OF TREATMENT AND WHILE UNDER MY CARE     (Physician attestation of this document indicates review and certification of the therapy plan).              Referring Provider:  Duy Leyva    Initial Assessment  See Epic Evaluation- Start of Care Date: 04/08/24

## 2024-04-11 ENCOUNTER — THERAPY VISIT (OUTPATIENT)
Dept: PHYSICAL THERAPY | Facility: CLINIC | Age: 84
End: 2024-04-11
Attending: STUDENT IN AN ORGANIZED HEALTH CARE EDUCATION/TRAINING PROGRAM
Payer: COMMERCIAL

## 2024-04-11 DIAGNOSIS — H81.12 BENIGN PAROXYSMAL POSITIONAL VERTIGO, LEFT: Primary | ICD-10-CM

## 2024-04-11 PROCEDURE — 97161 PT EVAL LOW COMPLEX 20 MIN: CPT | Mod: GP | Performed by: PHYSICAL THERAPIST

## 2024-04-11 PROCEDURE — 95992 CANALITH REPOSITIONING PROC: CPT | Mod: GP | Performed by: PHYSICAL THERAPIST

## 2024-04-11 NOTE — PROGRESS NOTES
PHYSICAL THERAPY EVALUATION  Type of Visit: Evaluation    See electronic medical record for Abuse and Falls Screening details.    Subjective       Presenting condition or subjective complaint: Dizziness, instability  Date of onset: 23    Relevant medical history: Implanted device; Osteoarthritis; Overweight; Pain at night or rest; Rheumatoid arthritis; Sleep disorder like apnea   Dates & types of surgery:    Past Surgical History:   Procedure Laterality Date    ABDOMEN SURGERY      c sections    ARTHROSCOPY KNEE Left     ARTHROSCOPY KNEE RT/LT  2006    left    BACK SURGERY      disc    BIOPSY BREAST      BREAST SURGERY      lumpectomy     BREAST SURGERY      lumpectomy    CATARACT EXTRACTION Bilateral     CATARACT IOL, RT/LT Bilateral 2010 aproximately     SECTION  1966     SECTION  1972    COLONOSCOPY  2009,    COLONOSCOPY      FINGER SURGERY Right 2019    Procedure: DISTAL ULNA RESECTION, RIGHT MIDDLE SILASTIC METACARPALPHALANGEAL EXCHANGE AND RIGHT ELBOW NODULE EXCISION.;  Surgeon: Hilario Redmond MD;  Location: [facility];  Service: Orthopedics    FOOT SURGERY Left     GYN SURGERY  66,72    HAND SURGERY Right     HAND SURGERY Right     HC ESOPH/GAS REFLUX TEST W NASAL IMPED >1 HR  2012    Procedure:ESOPHAGEAL IMPEDENCE FUNCTION TEST WITH 24 HOUR PH GREATER THAN 1 HOUR; Surgeon:KAYKAY JULIO; Location:[facility]    IR LUMBAR EPIDURAL STEROID INJECTION      IR TRANSLAMINAR EPIDURAL LUMBAR INJ INCL IMAGING  2012    LESI L5-S1 at [facility]    LUMBAR FUSION      OPEN REDUCTION INTERNAL FIXATION ELBOW Right 2023    Procedure: OPEN REDUCTION INTERNAL FIXATION, RIGHT OLECRANON FRACTURE;  Surgeon: Pili Brock MD;  Location:  OR    OPEN REDUCTION INTERNAL FIXATION WRIST Left 2023    Procedure: OPEN REDUCTION INTERNAL FIXATION, LEFT DISTAL RADIUS FRACTURE;  Surgeon: Pili Brock MD;  Location:  OR    OPTICAL  TRACKING SYSTEM FUSION POSTERIOR SPINE LUMBAR N/A 04/03/2017    Procedure: OPTICAL TRACKING SYSTEM FUSION SPINE POSTERIOR LUMBAR ONE LEVEL;  Surgeon: Anthony Michele MD;  Location: [facility]    ORTHOPEDIC SURGERY  1991    left foot surgery    ORTHOPEDIC SURGERY  1995    R MCP surgery    ORTHOPEDIC SURGERY  2007    right knee total replacement    TOTAL HIP ARTHROPLASTY Right     TOTAL KNEE ARTHROPLASTY Left     TOTAL KNEE ARTHROPLASTY Right     ZZC ANESTH,TOTAL HIP ARTHROPLASTY  2010    Rt hip    ZZC HAND/FINGER SURGERY UNLISTED  11/2005    right hand    ZZC TOTAL KNEE ARTHROPLASTY  2006    left     Prior diagnostic imaging/testing results: MRI     Prior therapy history for the same diagnosis, illness or injury: No      Prior Level of Function  Transfers: Independent  Ambulation: Independent, uses FWW  ADL: Independent  IADL: Driving, Finances, Housekeeping, Laundry, Meal preparation    Living Environment  Social support: Alone   Type of home: Apartment/condo   Stairs to enter the home: No       Ramp: No   Stairs inside the home: No       Help at home: None  Equipment owned: Four-point cane; Walker     Employment: Not Applicable    Hobbies/Interests: Reading    Patient goals for therapy: decrease dizziness    Pain assessment: Pain present- persistent low back pain     Objective    BED MOBILITY:  impaired by dizziness  TRANSFERS: WFL  GAIT:   Level of [place]:  AD  Assistive Device(s): Walker (four wheeled)  Gait Deviations: Antalgic 2/2 back pain  Gait Distance: 2x150 feet  BALANCE: impaired using FWW for stability       VESTIBULAR EVALUATION  ADDITIONAL HISTORY:  Description of symptoms: Attacks of dizziness  Notes that she is able to walk a bit better but still limited by her back pain.   Dizzy attacks:   Start: Continuation   Last attack: When I lay down   Frequency of occurrences: Every day   Length of attack: Very short  Difficulty hearing:  No changes, no hearing test  Noise in ears? Yes     Alleviates symptoms:  stay still  Worsens symptoms:  rolling in bed  Activities that bring on symptoms: Other  Moving head to fast    Pertinent visual history: last fall and eye appoinrtment updated presecription  Pertinent history of current vestibular problem: Migraines, Motion sickness (on ship)  DHI:      Cervicogenic Screen    Neck ROM WFL for testing, some discomfort   Vertebral Artery Test    Alar Ligament Test    Transverse Ligament Test    Distraction    Neck Torsion Test (head still, body rotating)    Neck Torsion Test (head and body rotating)         Oculomotor Screen    Ocular ROM Normal   Smooth Pursuit Normal   Saccades Normal   VOR    VOR Cancellation    Head Impulse Test    Convergence Testing         Infrared Goggle Exam Vestibular Suppressant in Last 24 Hours? No  Exam Completed With: Infrared goggles   Spontaneous Nystagmus Negative   Gaze Evoked Nystagmus Negative   Head Shake Horizontal Nystagmus    Positional Testing Upbeating L torsional   Supine Head-Hanging Test     Left Right   Cris-Hallpike Upbeating L torsional Negative   Sidelying Test Upbeating L torsional Negative   HSCC Supine Roll Test     HSCC Forward Roll Test     Waucoma and Lean Test -  Sitting Erect    Waucoma and Lean Test - Seated, Head Bent 60 Degrees Forward    Waucoma and Lean Test - Seated, Head Bent Backwards       BPPV Canal(s): L Posterior  BPPV Type: Canalithasis    Assessment & Plan   CLINICAL IMPRESSIONS  Medical Diagnosis: BPPV    Treatment Diagnosis: s/s BPPV, imbalance, dizziness   Impression/Assessment: Patient is a 83 year old female with dizziness and imbalance complaints.  The following significant findings have been identified: Pain, Decreased ROM/flexibility, Impaired balance, Impaired gait, Impaired muscle performance, Decreased activity tolerance, Instability, and Dizziness. These impairments interfere with their ability to perform self care tasks, work tasks, recreational activities, household chores, driving ,  household mobility, and community mobility as compared to previous level of function.     Clinical Decision Making (Complexity):  Clinical Presentation: Stable/Uncomplicated  Clinical Presentation Rationale: based on medical and personal factors listed in PT evaluation  Clinical Decision Making (Complexity): Low complexity    PLAN OF CARE  Treatment Interventions:  Interventions: Gait Training, Manual Therapy, Neuromuscular Re-education, Therapeutic Activity, Therapeutic Exercise, Self-Care/Home Management, Canalith Repositioning    Long Term Goals     PT Goal 1  Goal Identifier: DHI  Goal Description: Patient will complete the DHI with a score of <10/100 to demonstrate decreased perception of handicap and improved quality of life.  Rationale: to maximize safety and independence within the home;to maximize safety and independence with performance of ADLs and functional tasks;to maximize safety and independence within the community;to maximize safety and independence with transportation;to maximize safety and independence with self cares  Goal Progress: Eval: 16/100  Target Date: 05/23/24  PT Goal 2  Goal Identifier: BPPV/Bed Mobility  Goal Description: Patient will toelrate rolling in bed without increased dizziness as indicator that decreased s/s of BPPV.  Rationale: to maximize safety and independence with performance of ADLs and functional tasks;to maximize safety and independence within the home;to maximize safety and independence within the community;to maximize safety and independence with self cares;to maximize safety and independence with transportation  Target Date: 05/23/24      Frequency of Treatment: 1-2x per week  Duration of Treatment: 6 weeks    Recommended Referrals to Other Professionals:   Education Assessment:   Learner/Method: Patient;Demonstration;Listening  Education Comments: establish vestibular POC, BPPV handout, reading, education on factors for BPPV    Risks and benefits of  evaluation/treatment have been explained.   Patient/Family/caregiver agrees with Plan of Care.     Evaluation Time:     PT Ryann Low Complexity Minutes (16682): 30    Signing Clinician: Marie Robbins, PT      [facility]                                                                                   OUTPATIENT PHYSICAL THERAPY      PLAN OF TREATMENT FOR OUTPATIENT REHABILITATION   Patient's Last Name, First Name, Ginger Vu YOB: 1940   Provider's Name   [facility]   Medical Record No.  [id number]     Onset Date: 06/20/23  Start of Care Date: 04/11/24     Medical Diagnosis:  BPPV      PT Treatment Diagnosis:  s/s BPPV, imbalance, dizziness Plan of Treatment  Frequency/Duration: 1-2x per week/ 6 weeks    Certification date from 04/11/24 to 05/23/24         See note for plan of treatment details and functional goals     Marie Robbins, PT                         I CERTIFY THE NEED FOR THESE SERVICES FURNISHED UNDER        THIS PLAN OF TREATMENT AND WHILE UNDER MY CARE     (Physician attestation of this document indicates review and certification of the therapy plan).              Referring Provider:  Duy Leyva    Initial Assessment  See Epic Evaluation- Start of Care Date: 04/11/24

## 2024-04-15 ENCOUNTER — THERAPY VISIT (OUTPATIENT)
Dept: PHYSICAL THERAPY | Facility: CLINIC | Age: 84
End: 2024-04-15
Payer: COMMERCIAL

## 2024-04-15 DIAGNOSIS — M54.50 CHRONIC BILATERAL LOW BACK PAIN WITHOUT SCIATICA: Primary | Chronic | ICD-10-CM

## 2024-04-15 DIAGNOSIS — G89.29 CHRONIC BILATERAL LOW BACK PAIN WITHOUT SCIATICA: Primary | Chronic | ICD-10-CM

## 2024-04-15 PROCEDURE — 97110 THERAPEUTIC EXERCISES: CPT | Mod: GP | Performed by: PHYSICAL THERAPIST

## 2024-04-15 NOTE — PROGRESS NOTES
04/15/24 0500   Appointment Info   Signing clinician's name / credentials Avtar Ontiveros PT   Total/Authorized Visits 8   Visits Used 2   Medical Diagnosis LBP   PT Tx Diagnosis LBP   Quick Adds Certification   Progress Note/Certification   Start of Care Date 04/08/24   Onset of illness/injury or Date of Surgery 10/08/23   Therapy Frequency 1 x week   Predicted Duration 8 week   Certification date from 04/08/24   Certification date to 06/03/24       Present No   GOALS   PT Goals 2   PT Goal 1   Goal Description Pt will be able to tolerate standing for 30 minutes   Rationale to maximize safety and independence with performance of ADLs and functional tasks;to maximize safety and independence within the home;to maximize safety and independence within the community   Goal Progress met   Target Date 05/20/24   Date Met 04/15/24   Subjective Report   Subjective Report Pt reports feeling much better.  HEP helps a lot   Objective Measures   Objective Measures Objective Measure 1;Objective Measure 2   Objective Measure 1   Details LROM: flexion 100%, Est 75%, R and L SBing 75%, No sx's ecvident today   Treatment Interventions (PT)   Interventions Therapeutic Procedure/Exercise;Therapeutic Activity;Neuromuscular Re-education;Manual Therapy   Therapeutic Procedure/Exercise   Therapeutic Procedures: strength, endurance, ROM, flexibility minutes (26666) 25   Ther Proc 1 Education   Ther Proc 1 - Details educated regarding role of therapeutic exercise, POC, expected goal in therapy, nature of condition   PTRx Ther Proc 1 Sitting Flexion   PTRx Ther Proc 1 - Details 3-5 reps hold 10 seconds   PTRx Ther Proc 2 Step Standing Flexion   PTRx Ther Proc 2 - Details 3-5 reps hold 10 seconds   PTRx Ther Proc 3 Supine Lumbar Hip Roll   PTRx Ther Proc 3 - Details 3-5 reps hold 10 seconds   Skilled Intervention VC's and MC's   Patient Response/Progress tolerated well   Ther Proc 2 Pulldowns   Ther Proc 2 - Details  green x 20 in stading   Therapeutic Procedures Ther Proc 2;Ther Proc 3   Neuromuscular Re-education   Neuromuscular Re-education Neuro Re-ed 2   Neuro Re-ed 1 Posture education, sitting, standing, lifting   Skilled Intervention pain avoidance activities/positions   Patient Response/Progress tolerated well   Education   Learner/Method Patient;Listening;Demonstration;Pictures/Video   Education Comments Pt educated on plan of care   Plan   Plan for next session progress HEP as tolerated   Total Session Time   Timed Code Treatment Minutes 25   Total Treatment Time (sum of timed and untimed services) 25         DISCHARGE  Reason for Discharge: Patient has met all goals.    Equipment Issued:     Discharge Plan: Patient to continue home program.    Referring Provider:  Duy Leyva

## 2024-04-16 NOTE — PROGRESS NOTES
PT DISCHARGE  Reason for Discharge: Patient has not made expected progress due to interrupted treatment attendance.       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.22   PT Tx Diagnosis s/s subacute unilateral vestibular hypofunction   Precautions/Limitations Falls   Quick Adds Certification   Progress Note/Certification   Start of Care Date 01/02/24   Onset of illness/injury or Date of Surgery 12/06/24  (About 2 weeks before start of prednisone taper)   Therapy Frequency 1x per week, decreasing in frequency as indicated   Predicted Duration 8 weeks   Certification date from 01/02/24   Certification date to 02/27/24   GOALS   PT Goals 2;3;4;5   PT Goal 1   Goal Identifier DHI   Goal Description Patient will complete the DHI with a score of <20/100 to demonstrate decreased perception of handicap and improved quality of life.   Rationale to maximize safety and independence with performance of ADLs and functional tasks;to maximize safety and independence within the home;to maximize safety and independence with self cares;to maximize safety and independence within the community   Goal Progress Eval: 48/100   Target Date 02/27/24   PT Goal 2   Goal Identifier DVA   Goal Description Patient will complete DVA testing with a loss of 3 lines or less between static and 2 Hz head movement and no exacerbation of dizziness to demonstrate improved gaze stability for return to activities without limitation.   Rationale to maximize safety and independence with performance of ADLs and functional tasks;to maximize safety and independence within the home;to maximize safety and independence with self cares   Goal Progress Eval: Abnormal degradation of 8 lines, reproduction of dizziness 2/1/24: 5 line loss; 2/8/24: 4 line loss- progressing   Target Date 02/27/24   PT Goal 3   Goal Identifier HEP   Goal Description  Patient will demonstrate understanding and compliance to her HEP at least 3x per week for continued wellbeing upon discharge from skilled physical therapy.   Rationale to maximize safety and independence with performance of ADLs and functional tasks;to maximize safety and independence within the home;to maximize safety and independence within the community;to maximize safety and independence with self cares   Goal Progress met   Target Date 02/27/24   Date Met 02/08/24   PT Goal 4   Goal Identifier FGA   Goal Description Patient will complete the FGA with a score of 22/30 to demonstrate improved balance and decreased risk for falls.   Rationale to maximize safety and independence with performance of ADLs and functional tasks;to maximize safety and independence within the home;to maximize safety and independence within the community   Goal Progress Eval: 4 Item DGI; 6/12   Target Date 02/27/24   PT Goal 5   Goal Identifier Gait speed   Goal Description Patient will ambulate at a normal speed of >1.0 m/s with the least restrictive AD or no AD to demonstrate improved step length and gait speed for increased safety and independence with crossing the street.   Rationale to maximize safety and independence within the home;to maximize safety and independence within the community;to maximize safety and independence with transportation   Goal Progress Eval: 20 foot distance, no AD  0.55 m/s; 2/8/24: 20 foot self selected speed with no AD 0.63 m/s and fast speed 0.78 m/s = progressing   Target Date 02/27/24   Subjective Report   Subjective Report Patient reports that she has been sleeping better. She has not been experiencing dizzines sthroughout the day.   Objective Measures   Objective Measures Objective Measure 2   Objective Measure 1   Objective Measure DVA   Details 4 line change, see below 20/20 to 20/50   Objective Measure 2   Objective Measure Gait Speed   Details 20 foot self selected speed with no AD 0.63 m/s and  fast speed 0.78 m/s     Discharge Plan: back pain PT, return to vestibular therapy    Referring Provider:  Roe Hernandez

## 2024-04-18 ENCOUNTER — THERAPY VISIT (OUTPATIENT)
Dept: PHYSICAL THERAPY | Facility: CLINIC | Age: 84
End: 2024-04-18
Attending: STUDENT IN AN ORGANIZED HEALTH CARE EDUCATION/TRAINING PROGRAM
Payer: COMMERCIAL

## 2024-04-18 DIAGNOSIS — H81.12 BENIGN PAROXYSMAL POSITIONAL VERTIGO, LEFT: Primary | ICD-10-CM

## 2024-04-18 PROCEDURE — 95992 CANALITH REPOSITIONING PROC: CPT | Mod: GP | Performed by: PHYSICAL THERAPIST

## 2024-04-21 ASSESSMENT — PATIENT HEALTH QUESTIONNAIRE - PHQ9
10. IF YOU CHECKED OFF ANY PROBLEMS, HOW DIFFICULT HAVE THESE PROBLEMS MADE IT FOR YOU TO DO YOUR WORK, TAKE CARE OF THINGS AT HOME, OR GET ALONG WITH OTHER PEOPLE: NOT DIFFICULT AT ALL
SUM OF ALL RESPONSES TO PHQ QUESTIONS 1-9: 5
SUM OF ALL RESPONSES TO PHQ QUESTIONS 1-9: 5

## 2024-04-22 ENCOUNTER — VIRTUAL VISIT (OUTPATIENT)
Dept: PSYCHOLOGY | Facility: CLINIC | Age: 84
End: 2024-04-22
Payer: COMMERCIAL

## 2024-04-22 DIAGNOSIS — F33.1 MODERATE EPISODE OF RECURRENT MAJOR DEPRESSIVE DISORDER (H): Primary | ICD-10-CM

## 2024-04-22 PROCEDURE — 90837 PSYTX W PT 60 MINUTES: CPT | Mod: 95

## 2024-04-22 NOTE — PROGRESS NOTES
[facility]                                     Progress Note    Patient Name: Ginger Marshall  Date: 4/22/24         Service Type: Individual      Session Start Time: 10:00 am  Session End Time: 10:53 am     Session Length: 53 min    Session #: 15    Answers submitted by the patient for this visit:  Patient Health Questionnaire (Submitted on 9/26/2023)  If you checked off any problems, how difficult have these problems made it for you to do your work, take care of things at home, or get along with other people?: Somewhat difficult  PHQ9 TOTAL SCORE: 4    Attendees: Client attended alone    Service Modality:  Video Visit:      Provider verified identity through the following two step process.  Patient provided:  Patient is known previously to provider    Telemedicine Visit: The patient's condition can be safely assessed and treated via synchronous audio and visual telemedicine encounter.      Reason for Telemedicine Visit: Patient convenience (e.g. access to timely appointments / distance to available provider)    Originating Site (Patient Location): Patient's home    Distant Site (Provider Location): Provider Remote Setting- Home Office    Consent:  The patient/guardian has verbally consented to: the potential risks and benefits of telemedicine (video visit) versus in person care; bill my insurance or make self-payment for services provided; and responsibility for payment of non-covered services.     Patient would like the video invitation sent by:  My Chart    Mode of Communication:  Video Conference via [facility]well    Distant Location (Provider):  Off-site    As the provider I attest to compliance with applicable laws and regulations related to telemedicine.    DATA  Interactive Complexity: No     Crisis: No    Extended Session (53+ minutes): PROLONGED SERVICE IN THE OUTPATIENT SETTING REQUIRING DIRECT (FACE-TO-FACE) PATIENT CONTACT BEYOND THE USUAL SERVICE:    - Longer session due to limited  access to mental health appointments and necessity to address patient's distress / complexity  Interactive Complexity: No  Crisis: No      Progress Since Last Session (Related to Symptoms / Goals / Homework):   Symptoms: No change   , low energy    Homework: Partially completed      Episode of Care Goals: Minimal progress - ACTION (Actively working towards change); Intervened by reinforcing change plan / affirming steps taken     Current / Ongoing Stressors and Concerns:  Vertigo, head pain, back pain improving, energy increase. Working on [facility] for support, impatience with the process. Low quality sleep, even with CPAP coming unhooked-waking up tired, nap in afternoon. Focus on committees,  socializing with various Christianity groups.   Ongoing: Grieving loss of adult son who was blind and lived alone, fell and passed away, second loss of an adult/child. Regrets about parenting.      Treatment Objective(s) Addressed in This Session:    Practice boundaries and radical acceptance of reality regarding sister, sons, reality of accident  Improve quantity and quality of night time sleep / decrease daytime naps      Intervention:  Supportive therapy: Provided active listening and validation. Provided grounding/mindfulness skills    Motivational Interviewing  Target Behavior:  radical acceptance of reality, gratitude ,   Proactive communication with providers, [facility]    MI Intervention: Expressed Empathy/Understanding, Supported Autonomy, Collaboration, Evocation and Open-ended questions     Change Talk Expressed by the Patient: Desire to change Reasons to change Activation    Provider Response to Change Talk: E - Evoked more info from patient about behavior change and A - Affirmed patient's thoughts, decisions, or attempts at behavior change    Assessments completed prior to visit:  DA  12/5/22  The following assessments were completed by patient for this visit:  PHQ2:       2/27/2023     8:50 AM 2/24/2023      6:48 AM 2/20/2023     5:39 PM 1/15/2023    11:54 AM 11/16/2022     1:33 PM 8/5/2022     8:11 AM 7/6/2022     4:18 PM   PHQ-2 ( 1999 [employer])   Q1: Little interest or pleasure in doing things 0 1 1 1 1 3    Q2: Feeling down, depressed or hopeless 0 1 1 1 1 0    PHQ-2 Score 0 2 2 2 2 3    Q1: Little interest or pleasure in doing things  Several days Several days Several days Several days Nearly every day    Q2: Feeling down, depressed or hopeless  Several days Several days Several days Several days Not at all    PHQ-2 Score  2 2 2 2 3 Incomplete     PHQ9:       11/27/2023    12:37 PM 1/16/2024     6:18 PM 1/22/2024     8:44 AM 2/28/2024    10:13 AM 3/19/2024    10:05 AM 4/2/2024     9:02 PM 4/21/2024     1:24 PM   PHQ-9 SCORE   PHQ-9 Total Score MyChart 6 (Mild depression) 4 (Minimal depression) 5 (Mild depression) 12 (Moderate depression) 8 (Mild depression) 3 (Minimal depression) 5 (Mild depression)   PHQ-9 Total Score 6 4 5 12 8 3 5     Patient Health Questionnaire (Submitted on 9/26/2023)  If you checked off any problems, how difficult have these problems made it for you to do your work, take care of things at home, or get along with other people?: Somewhat difficult  PHQ9 TOTAL SCORE: 4  GAD2:       8/23/2023     8:31 AM 9/23/2023    10:05 AM 11/19/2023    10:24 AM 1/15/2024    10:35 AM 2/16/2024     8:34 AM 3/13/2024     6:28 PM 4/21/2024     1:25 PM   SPRING-2   Feeling nervous, anxious, or on edge 0 0 0 1 1 1 0   Not being able to stop or control worrying 0 0 0 0 0 0 0   SPRING-2 Total Score 0 0 0 1 1 1 0     GAD7:       6/16/2020     9:54 AM 7/6/2022     4:18 PM 8/5/2022     8:11 AM 11/16/2022     1:33 PM 6/5/2023     2:31 PM 2/28/2024    10:14 AM 4/2/2024     9:05 PM   SPRING-7 SCORE   Total Score  2 (minimal anxiety) 5 (mild anxiety) 2 (minimal anxiety)  5 (mild anxiety) 1 (minimal anxiety)   Total Score 7 2 5 2 3 5 1     CAGE-AID:       12/5/2022     9:28 AM   CAGE-AID Total Score   Total Score 0   Total Score  MyChart 0 (A total score of 2 or greater is considered clinically significant)     PROMIS 10-Global Health (only subscores and total score):       11/15/2018     1:00 PM 3/25/2022     7:34 AM 12/5/2022     9:28 AM 3/12/2023    10:38 AM 8/23/2023     8:34 AM 12/14/2023     6:28 AM 3/13/2024     6:31 PM   PROMIS-10 Scores Only   Global Mental Health Score 12 12 8    8 8    8 12 13 11   Global Physical Health Score 12 12 13    13 11    11 12 12 11   PROMIS TOTAL - SUBSCORES 24 24 21    21 19    19 24 25 22     Jackson Suicide Severity Rating Scale (Lifetime/Recent)      12/5/2022    11:00 AM   Jackson Suicide Severity Rating (Lifetime/Recent)   Q1 Wished to be Dead (Past Month) no   Q2 Suicidal Thoughts (Past Month) no   Q3 Suicidal Thought Method no   Q4 Suicidal Intent without Specific Plan no   Q5 Suicide Intent with Specific Plan no   Q6 Suicide Behavior (Lifetime) no   Level of Risk per Screen low risk         ASSESSMENT: Current Emotional / Mental Status (status of significant symptoms):   Risk status (Self / Other harm or suicidal ideation)   Patient denies current fears or concerns for personal safety.   Patient denies current or recent suicidal ideation or behaviors.   Patient denies current or recent homicidal ideation or behaviors.   Patient denies current or recent self injurious behavior or ideation.   Patient denies other safety concerns.   Patient reports there has been no change in risk factors since their last session.     Patient reports there has been no change in protective factors since their last session.     Recommended that patient call 911 or go to the local ED should there be a change in any of these risk factors.     Appearance:   Appropriate    Eye Contact:   Good    Psychomotor Behavior: Normal    Attitude:   Pleasant Attentive   Orientation:   All   Speech    Rate / Production: Emotional Normal     Volume:  Normal    Mood:    Irritable  Normal   Affect:    Appropriate    Thought  Content:  Clear    Thought Form:  Coherent  Logical    Insight:    Good      Medication Review:   No changes to current psychiatric medication(s)     Medication Compliance:   Yes     Changes in Health Issues:   None reported     Chemical Use Review:   Substance Use: Chemical use reviewed, no active concerns identified      Tobacco Use: No current tobacco use.      Diagnosis:  1. Moderate episode of recurrent major depressive disorder (H)      Collateral Reports Completed:   Not Applicable    PLAN: (Patient Tasks / Therapist Tasks / Other)   Embrace radical acceptance and boundaries. Use proactive communication with providers, family, try grounding /mindfulness, review [address] program info-pursue resources for josef Arenas, [facility] 4/22/2024                                                                                                  Individual Treatment Plan    Patient's Name: Ginger Marshall  YOB: 1940    Date of Creation: 2/8/23  Date Treatment Plan Last Reviewed/Revised:   4/22/24    DSM5 Diagnoses: 296.31 (F33.0) Major Depressive Disorder, Recurrent Episode, Mild With anxious distress  Psychosocial / Contextual Factors: aging, losses, generational trauma  PROMIS (reviewed every 90 days):   21  12/5/22    Referral / Collaboration:  Referral to another professional/service is not indicated at this time..    Anticipated number of session for this episode of care: 6-9 sessions  Anticipation frequency of session: Every other week  Anticipated Duration of each session: 38-52 minutes  Treatment plan will be reviewed in 90 days or when goals have been changed.     MeasurableTreatment Goal(s) related to diagnosis / functional impairment(s)  Goal 1: Patient will experience an improvement in mood and functioning as evidenced by assessment scores, self report and clinician observation    I will know I've met my goal when things feel better (paraphrase).      Objective #A (Patient Action)    Patient  will increase understanding of steps in the grief process   Talk to others about losses  Use prayer practices and jena community to support well being.   Practice boundaries and radical acceptance of reality regarding sister sons, reality of accident   Engage in social activities at [facility]  Status: 4/22/24    Intervention(s)  Therapist will teach CBT, DBT  and support grief processing skills, prayer practices .    Objective #B  Patient will Increase interest, engagement, and pleasure in doing things  Decrease frequency and intensity of feeling down, depressed, hopeless  Improve quantity and quality of night time sleep / decrease daytime naps  Feel less tired and more energy during the day   Improve diet, appetite, mindful eating, and / or meal planning  Identify negative self-talk and behaviors: challenge core beliefs, myths, and actions  Improve concentration, focus, and mindfulness in daily activities   Feel less fidgety, restless or slow in daily activities / interpersonal interactions.  Status: 4/22/2024    Intervention(s)  Therapist will  teach cbt, dbt,MI, mindfulness and behavioral activation and assign homework .      Patient has reviewed and agreed to the above plan.      Rocío Arenas, [facility]  4/22/2024

## 2024-04-25 ENCOUNTER — THERAPY VISIT (OUTPATIENT)
Dept: PHYSICAL THERAPY | Facility: CLINIC | Age: 84
End: 2024-04-25
Attending: STUDENT IN AN ORGANIZED HEALTH CARE EDUCATION/TRAINING PROGRAM
Payer: COMMERCIAL

## 2024-04-25 DIAGNOSIS — H81.12 BENIGN PAROXYSMAL POSITIONAL VERTIGO, LEFT: Primary | ICD-10-CM

## 2024-04-25 PROCEDURE — 97530 THERAPEUTIC ACTIVITIES: CPT | Mod: GP | Performed by: PHYSICAL THERAPIST

## 2024-04-25 PROCEDURE — 97112 NEUROMUSCULAR REEDUCATION: CPT | Mod: GP | Performed by: PHYSICAL THERAPIST

## 2024-04-29 ENCOUNTER — PATIENT OUTREACH (OUTPATIENT)
Dept: CARE COORDINATION | Facility: CLINIC | Age: 84
End: 2024-04-29
Payer: COMMERCIAL

## 2024-04-29 NOTE — PROGRESS NOTES
Clinic Care Coordination Contact  [facility]/Voicemail    Clinical Data: Care Coordinator Outreach    Outreach Documentation Number of Outreach Attempt   4/29/2024  11:37 AM 1       Left message on patient's voicemail with call back information and requested return call.    Plan: Care Coordinator will try to reach patient again in 10 business days.    NATALY Cifuentes, B.S. [facility] Care Coordination  [facility]:  [place], [place] and [place]  [phone]  [email or web address]

## 2024-04-29 NOTE — Clinical Note
Patient is here today for weight check.   Wt Readings from Last 4 Encounters: 05/10/17 185 lb 8 oz (84.1 kg) 04/13/17 192 lb (87.1 kg) 04/03/17 190 lb (86.2 kg) 03/27/17 191 lb (86.6 kg)  Patient does have a back brace on today due to surgery, so weight is not as accurate as it could be. Forwarding message to Dr. Vázquez. Charline Alves MA  
You can access the FollowMyHealth Patient Portal offered by [facility] by registering at the following website: [email or web address]. By joining [employer]’s FollowMyHealth portal, you will also be able to view your health information using other applications (apps) compatible with our system.

## 2024-05-10 DIAGNOSIS — A60.04 HERPES SIMPLEX VULVOVAGINITIS: ICD-10-CM

## 2024-05-10 RX ORDER — VALACYCLOVIR HYDROCHLORIDE 500 MG/1
500 TABLET, FILM COATED ORAL 2 TIMES DAILY
Qty: 180 TABLET | Refills: 1 | Status: SHIPPED | OUTPATIENT
Start: 2024-05-10

## 2024-05-13 ENCOUNTER — ANCILLARY PROCEDURE (OUTPATIENT)
Dept: GENERAL RADIOLOGY | Facility: CLINIC | Age: 84
End: 2024-05-13
Attending: PODIATRIST
Payer: COMMERCIAL

## 2024-05-13 ENCOUNTER — OFFICE VISIT (OUTPATIENT)
Dept: PODIATRY | Facility: CLINIC | Age: 84
End: 2024-05-13
Payer: COMMERCIAL

## 2024-05-13 VITALS — BODY MASS INDEX: 34.28 KG/M2 | WEIGHT: 206 LBS | DIASTOLIC BLOOD PRESSURE: 80 MMHG | SYSTOLIC BLOOD PRESSURE: 139 MMHG

## 2024-05-13 DIAGNOSIS — S92.411A CLOSED DISPLACED FRACTURE OF PROXIMAL PHALANX OF RIGHT GREAT TOE, INITIAL ENCOUNTER: ICD-10-CM

## 2024-05-13 DIAGNOSIS — L84 CALLUS: ICD-10-CM

## 2024-05-13 DIAGNOSIS — S92.411A CLOSED DISPLACED FRACTURE OF PROXIMAL PHALANX OF RIGHT GREAT TOE, INITIAL ENCOUNTER: Primary | ICD-10-CM

## 2024-05-13 DIAGNOSIS — L90.9 ATROPHIC PLANTAR FAT PAD: ICD-10-CM

## 2024-05-13 PROCEDURE — 11055 PARING/CUTG B9 HYPRKER LES 1: CPT | Mod: GA | Performed by: PODIATRIST

## 2024-05-13 PROCEDURE — 99213 OFFICE O/P EST LOW 20 MIN: CPT | Mod: 25 | Performed by: PODIATRIST

## 2024-05-13 PROCEDURE — 73660 X-RAY EXAM OF TOE(S): CPT | Mod: TC | Performed by: RADIOLOGY

## 2024-05-13 NOTE — LETTER
2024         RE: Ginger Marshall  [address]  [place] [address]        Dear Colleague,    Thank you for referring your patient, Ginger Marshall, to the [facility]OD[facility]. Please see a copy of my visit note below.    Foot & Ankle Surgery   May 13, 2024    S:  Pt is seen today for evaluation of right foot pain.  I saw her in May 2023 for a right great toe proximal phalanx fracture.  She is having pain in the right foot but points to the plantar aspect of the first MPJ.  No injury noted.  She states she has a painful callus.  This has significant altered the way she walks and has been developing hip pain.    Vitals:    24 1018   BP: 139/80   Weight: 93.4 kg (206 lb)   '      ROS - Pos for CC.  Patient denies current nausea, vomiting, chills, fevers, belly pain, calf pain, chest pain or SOB.  Complete remainder of ROS it otherwise neg.      PE:  Gen:   No apparent distress  Eye:    Visual scanning without deficit  Ear:    Response to auditory stimuli wnl  Lung:    Non-labored breathing on RA noted  Abd:    NTND per patient report  Lymph:    Neg for pitting/non-pitting edema BLE  Vasc:    Pulses palpable, CFT minimally delayed  Neuro:    Light touch sensation intact to all sensory nerve distributions without paresthesias  Derm:    Painful callus with intralesional hemorrhaging at the level of the right fibular sesamoid  MSK:    Cavus foot structure, prominent first MPJ plantarly.  Atrophy plantar fat pad  Calf:    Neg for redness, swelling or tenderness      Imagin views WB R great toe - degenerative changes at MPJ and hallux IPJ.  Step-off seen at proximalphalanx at the level of the IPJ.      Assessment:  83 year old female with painful callus at the plantar aspect of the right first MPJ in setting of cavus foot structure and atrophy plantar fat pad      Medical Decision Making/Plan gel pads/inserts to provide cushion discussed etiologies, anatomy and options  1.   Painful callus at the plantar aspect of the right first MPJ in setting of cavus foot structure and atrophied plantar fat pad  -We discussed that many hyper-keratotic lesions are caused by pressure or shearing forces on the skin, and these can often be treated sufficiently with shoes, inserts and local tissue debridement.  Some lesions, however, can have a deep core, and while home treatments are helpful, occasional clinical debridement may be necessary.  In certain cases, surgical excision may be required if no other treatments have proven sufficient.  -Our home instruction handout was dispensed, detailing home therapies to minimize regrowth of the hyperkeratotic tissue.    -we discussed a rough estimate of the cost of debridement in clinic, and how insurance may not cover the cost meaning the patient may be responsible.    -Debrided with a 15 blade x 1 without incident.  The paper ABN was signed by the patient, the electronic ABN was signed by myself  -I recommend comfortable shoes and minimizing shoeless walking based on symptoms; we discussed utilizing pads/inserts to provide cushion and padding  -Our routine footcare information was dispensed  -Advised utilizing lotion to help keep the callus skin soft and supple    Follow up:  prn or sooner with acute issues           Bernabe Munoz DPM FACFAS FACFAOM  Podiatric Foot & Ankle Surgeon  [facility]  [phone]    Disclaimer: This note consists of symbols derived from keyboarding, dictation and/or voice recognition software. As a result, there may be errors in the script that have gone undetected. Please consider this when interpreting information found in this chart.        Again, thank you for allowing me to participate in the care of your patient.        Sincerely,        Bernabe Munoz DPM, WILLIAM

## 2024-05-13 NOTE — PATIENT INSTRUCTIONS
Thank you for choosing [facility] Podiat[facility] / Foot & Ankle Surgery!    DR. XIE'S CLINIC LOCATIONS:     [facility] (Friday) TRIAGE LINE: [phone]   [address] [facility]  APPOINTMENTS: [phone]   [place] [address] RADIOLOGY: [phone]    PHYSICAL THERAPY: [phone]    SET UP SURGERY: [phone]   [place] (Mon-Tues AM-Thurs) BILLING QUESTIONS: [phone] [address] FAX: [phone]   [place] [address] [facility]: [phone]      You are seen today for the painful callus at the plantar aspect of the right first metatarsal phalangeal joint.  The lesion was debrided.  Recommendations:    1.  Comfortable shoes and minimize shoeless walking based on pain;    2.  There are multiple different types of pads to provide cushion to the bottom of the foot.  I would recommend utilizing a silicone gel insert for your shoes, such as a Dr. Gauthier's insert;    3.  See below for home callus instructions.  The purpose of this is to address current callus and hopefully minimize future callus growth;    4.  See below for routine footcare information where he can have nail and callus debridement done regularly    Tips for treating painful calluses:    1.  Pumice stone/farida board therapy multiple times weekly to remove callus tissue as it builds up    2.  Good supportive shoes and minimizing barefoot ambulation can slow down callus formation, and can decrease pain levels    3.  Gel inserts can also provide padding to the bottom of the foot, to prevent pain and slow recurrence.    4.  Applying lotion daily/twice daily to the callus tissue can keep it softer and less painful.  Lotions with lactic acid or urea work well, but most lotions are sufficient      Here is a list of routine foot care resources, which includes toenail trimming and callus/corn management.     This is not a referral. It is your responsibility to contact the organization and your insurance to confirm  cost and coverage.      ROUTINE FOOT CARE (NAIL TRIMMING / CALLUSES)      [facility]  [phone]   [facility]  [phone]  Multiple locations   [facility]  [phone] Dr. Morales and Dr. Ferrari  [address] [address] [phone]   [facility]  [phone]  [email or web address] [email or web address]

## 2024-05-13 NOTE — PROGRESS NOTES
Foot & Ankle Surgery   May 13, 2024    S:  Pt is seen today for evaluation of right foot pain.  I saw her in May 2023 for a right great toe proximal phalanx fracture.  She is having pain in the right foot but points to the plantar aspect of the first MPJ.  No injury noted.  She states she has a painful callus.  This has significant altered the way she walks and has been developing hip pain.    Vitals:    24 1018   BP: 139/80   Weight: 93.4 kg (206 lb)   '      ROS - Pos for CC.  Patient denies current nausea, vomiting, chills, fevers, belly pain, calf pain, chest pain or SOB.  Complete remainder of ROS it otherwise neg.      PE:  Gen:   No apparent distress  Eye:    Visual scanning without deficit  Ear:    Response to auditory stimuli wnl  Lung:    Non-labored breathing on RA noted  Abd:    NTND per patient report  Lymph:    Neg for pitting/non-pitting edema BLE  Vasc:    Pulses palpable, CFT minimally delayed  Neuro:    Light touch sensation intact to all sensory nerve distributions without paresthesias  Derm:    Painful callus with intralesional hemorrhaging at the level of the right fibular sesamoid  MSK:    Cavus foot structure, prominent first MPJ plantarly.  Atrophy plantar fat pad  Calf:    Neg for redness, swelling or tenderness      Imagin views WB R great toe - degenerative changes at MPJ and hallux IPJ.  Step-off seen at proximalphalanx at the level of the IPJ.      Assessment:  83 year old female with painful callus at the plantar aspect of the right first MPJ in setting of cavus foot structure and atrophy plantar fat pad      Medical Decision Making/Plan gel pads/inserts to provide cushion discussed etiologies, anatomy and options  1.  Painful callus at the plantar aspect of the right first MPJ in setting of cavus foot structure and atrophied plantar fat pad  -We discussed that many hyper-keratotic lesions are caused by pressure or shearing forces on the skin, and these can often be treated  sufficiently with shoes, inserts and local tissue debridement.  Some lesions, however, can have a deep core, and while home treatments are helpful, occasional clinical debridement may be necessary.  In certain cases, surgical excision may be required if no other treatments have proven sufficient.  -Our home instruction handout was dispensed, detailing home therapies to minimize regrowth of the hyperkeratotic tissue.    -we discussed a rough estimate of the cost of debridement in clinic, and how insurance may not cover the cost meaning the patient may be responsible.    -Debrided with a 15 blade x 1 without incident.  The paper ABN was signed by the patient, the electronic ABN was signed by myself  -I recommend comfortable shoes and minimizing shoeless walking based on symptoms; we discussed utilizing pads/inserts to provide cushion and padding  -Our routine footcare information was dispensed  -Advised utilizing lotion to help keep the callus skin soft and supple    Follow up:  prn or sooner with acute issues           Bernabe Munoz DPM [facility]ACCl[facility] FACFAOM  Podiatric Foot & Ankle Surgeon  [facility]  [phone]    Disclaimer: This note consists of symbols derived from keyboarding, dictation and/or voice recognition software. As a result, there may be errors in the script that have gone undetected. Please consider this when interpreting information found in this chart.

## 2024-05-14 ENCOUNTER — LAB (OUTPATIENT)
Dept: LAB | Facility: CLINIC | Age: 84
End: 2024-05-14
Payer: COMMERCIAL

## 2024-05-14 DIAGNOSIS — D75.89 MACROCYTOSIS WITHOUT ANEMIA: ICD-10-CM

## 2024-05-14 DIAGNOSIS — Z79.899 HIGH RISK MEDICATION USE: ICD-10-CM

## 2024-05-14 DIAGNOSIS — M05.79 RHEUMATOID ARTHRITIS INVOLVING MULTIPLE SITES WITH POSITIVE RHEUMATOID FACTOR (H): ICD-10-CM

## 2024-05-14 LAB
ALBUMIN SERPL BCG-MCNC: 4.5 G/DL (ref 3.5–5.2)
ALP SERPL-CCNC: 50 U/L (ref 40–150)
ALT SERPL W P-5'-P-CCNC: 23 U/L (ref 0–50)
AST SERPL W P-5'-P-CCNC: 29 U/L (ref 0–45)
BILIRUB DIRECT SERPL-MCNC: <0.2 MG/DL (ref 0–0.3)
BILIRUB SERPL-MCNC: 0.4 MG/DL
CREAT SERPL-MCNC: 0.55 MG/DL (ref 0.51–0.95)
CRP SERPL-MCNC: 3.53 MG/L
EGFRCR SERPLBLD CKD-EPI 2021: >90 ML/MIN/1.73M2
ERYTHROCYTE [SEDIMENTATION RATE] IN BLOOD BY WESTERGREN METHOD: 33 MM/HR (ref 0–30)
PROT SERPL-MCNC: 7.9 G/DL (ref 6.4–8.3)
RETICS # AUTO: 0.08 10E6/UL (ref 0.03–0.1)
RETICS/RBC NFR AUTO: 2.2 % (ref 0.5–2)

## 2024-05-14 PROCEDURE — 99207 BLOOD MORPHOLOGY PATHOLOGIST REVIEW: CPT | Performed by: PATHOLOGY

## 2024-05-14 PROCEDURE — 86140 C-REACTIVE PROTEIN: CPT

## 2024-05-14 PROCEDURE — 80076 HEPATIC FUNCTION PANEL: CPT

## 2024-05-14 PROCEDURE — 85045 AUTOMATED RETICULOCYTE COUNT: CPT

## 2024-05-14 PROCEDURE — 36415 COLL VENOUS BLD VENIPUNCTURE: CPT

## 2024-05-14 PROCEDURE — 85652 RBC SED RATE AUTOMATED: CPT

## 2024-05-14 PROCEDURE — 85025 COMPLETE CBC W/AUTO DIFF WBC: CPT

## 2024-05-14 PROCEDURE — 82565 ASSAY OF CREATININE: CPT

## 2024-05-15 ENCOUNTER — PATIENT OUTREACH (OUTPATIENT)
Dept: CARE COORDINATION | Facility: CLINIC | Age: 84
End: 2024-05-15
Payer: COMMERCIAL

## 2024-05-15 LAB
BASOPHILS # BLD AUTO: 0 10E3/UL (ref 0–0.2)
BASOPHILS NFR BLD AUTO: 0 %
EOSINOPHIL # BLD AUTO: 0.2 10E3/UL (ref 0–0.7)
EOSINOPHIL NFR BLD AUTO: 3 %
ERYTHROCYTE [DISTWIDTH] IN BLOOD BY AUTOMATED COUNT: 14.4 % (ref 10–15)
HCT VFR BLD AUTO: 38.1 % (ref 35–47)
HGB BLD-MCNC: 12.6 G/DL (ref 11.7–15.7)
IMM GRANULOCYTES # BLD: 0.1 10E3/UL
IMM GRANULOCYTES NFR BLD: 1 %
LYMPHOCYTES # BLD AUTO: 1.1 10E3/UL (ref 0.8–5.3)
LYMPHOCYTES NFR BLD AUTO: 16 %
MCH RBC QN AUTO: 33.9 PG (ref 26.5–33)
MCHC RBC AUTO-ENTMCNC: 33.1 G/DL (ref 31.5–36.5)
MCV RBC AUTO: 102 FL (ref 78–100)
MONOCYTES # BLD AUTO: 0.8 10E3/UL (ref 0–1.3)
MONOCYTES NFR BLD AUTO: 12 %
NEUTROPHILS # BLD AUTO: 4.9 10E3/UL (ref 1.6–8.3)
NEUTROPHILS NFR BLD AUTO: 68 %
NRBC # BLD AUTO: 0 10E3/UL
NRBC BLD AUTO-RTO: 0 /100
PATH REPORT.COMMENTS IMP SPEC: NORMAL
PATH REPORT.COMMENTS IMP SPEC: NORMAL
PATH REPORT.FINAL DX SPEC: NORMAL
PATH REPORT.MICROSCOPIC SPEC OTHER STN: NORMAL
PATH REPORT.MICROSCOPIC SPEC OTHER STN: NORMAL
PATH REPORT.RELEVANT HX SPEC: NORMAL
PLATELET # BLD AUTO: 387 10E3/UL (ref 150–450)
RBC # BLD AUTO: 3.72 10E6/UL (ref 3.8–5.2)
WBC # BLD AUTO: 7.1 10E3/UL (ref 4–11)

## 2024-05-15 NOTE — LETTER
[facility] CARE COORDINATION  [address]  [facility] [address]     May 15, 2024    Ginger Marshall  [address]   [facility] [place] [place] [address]      Dear Ginger,    I have been unsuccessful in reaching you since our last contact. At this time the Care Coordination team will make no further attempts to reach you, however this does not change your ability to continue receiving care from your providers at your primary care clinic. If you need additional support from a care coordinator in the future please contact me at [phone].    All of us at [facility] are invested in your health and are here to assist you in meeting your goals.     Sincerely,    Lucille Awad RN Care Coordinator  [facility] [employer] [place], [place]  Email: [email or web address]  Phone: [phone]

## 2024-05-15 NOTE — PROGRESS NOTES
Clinic Care Coordination Contact    Situation: Patient chart reviewed by care coordinator.    Background: Patient is enrolled in clinic care coordination and followed to assist with goal(s) progression. CHW CC routed chart to RNCC for review to determine eligibility of disenrolling from Care Coordination per standard work.     Assessment: Per Chart Review, patient has been unreachable for follow up x 2 attempts with no further engagement or return calls.     Plan/Recommendations: Per Care Coordination standard work, patient will be disenrolled from Care Coordination. Care Coordinator will send disenrollment letter with care coordinator contact information via [place]. Care Coordinator will do no further outreaches at this time. Care Coordinator will remain available as needed. New Care Coordination referral may be ordered with any future needs for resources, assistance, and/or support if patient is agreeable and remains engaged.        Lucille Awad RN Care Coordinator  [facility]  Email: [email or web address]  Phone: [phone]

## 2024-05-15 NOTE — PROGRESS NOTES
Clinic Care Coordination Contact  [facility]/Voicemail    Clinical Data: Care Coordinator Outreach    Outreach Documentation Number of Outreach Attempt   4/29/2024  11:37 AM 1   5/15/2024   2:00 PM 2       Left message on patient's voicemail with call back information and requested return call.    Plan: CHW has been unsuccessful in multiple recent attempts to contact patient. Will route to lead CC to review for disenrollment and/or further direction per standard work. No further outreach will be made at this time.     NATALY Cifuentes, B.S. [facility] Care Coordination  [facility]:  [place], [place]  [phone]  [email or web address]

## 2024-05-17 ENCOUNTER — VIRTUAL VISIT (OUTPATIENT)
Dept: RHEUMATOLOGY | Facility: CLINIC | Age: 84
End: 2024-05-17
Payer: COMMERCIAL

## 2024-05-17 DIAGNOSIS — Z74.09 MOBILITY IMPAIRED: ICD-10-CM

## 2024-05-17 DIAGNOSIS — G89.29 CHRONIC MIDLINE LOW BACK PAIN WITH LEFT-SIDED SCIATICA: ICD-10-CM

## 2024-05-17 DIAGNOSIS — M54.42 CHRONIC MIDLINE LOW BACK PAIN WITH LEFT-SIDED SCIATICA: ICD-10-CM

## 2024-05-17 DIAGNOSIS — Z79.899 HIGH RISK MEDICATION USE: ICD-10-CM

## 2024-05-17 DIAGNOSIS — M05.79 RHEUMATOID ARTHRITIS INVOLVING MULTIPLE SITES WITH POSITIVE RHEUMATOID FACTOR (H): Primary | ICD-10-CM

## 2024-05-17 PROCEDURE — G2211 COMPLEX E/M VISIT ADD ON: HCPCS | Mod: 95 | Performed by: INTERNAL MEDICINE

## 2024-05-17 PROCEDURE — 99214 OFFICE O/P EST MOD 30 MIN: CPT | Mod: 95 | Performed by: INTERNAL MEDICINE

## 2024-05-17 RX ORDER — METHOTREXATE 25 MG/.5ML
25 INJECTION, SOLUTION SUBCUTANEOUS
Qty: 2 ML | Refills: 6 | Status: SHIPPED | OUTPATIENT
Start: 2024-05-17 | End: 2024-09-10

## 2024-05-17 RX ORDER — UPADACITINIB 15 MG/1
15 TABLET, EXTENDED RELEASE ORAL DAILY
Qty: 30 TABLET | Refills: 6 | Status: SHIPPED | OUTPATIENT
Start: 2024-05-17 | End: 2024-09-10

## 2024-05-17 NOTE — PROGRESS NOTES
Rheumatology Clinic Visit      Ginger Marshall MRN# [id number]   YOB: 1940 Age: 83 year old      Date of visit: 5/17/24   PCP: Dr. Duy Leyva    Chief Complaint   Patient presents with:  Rheumatoid Arthritis    Assessment and Plan     1. Seropositive Erosive Rheumatoid Arthritis ( [2009], CCP >100 [2009]; hx of rheumatoid nodule by 6/14/2011 pathology): Previously failed remicade (staph infection during therapy, but was immediately after a joint injection), orencia (migraines), HCQ (ineffective), Xeljanz (partially effective).  Sulfa allergy.  Currently on methotrexate 25 mg SQ once weekly (dose reduction in the past resulted in more symptoms), leucovorin 5 mg weekly (resolved nasal sore that didn't improve with higher daily folic acid doses), and Rinvoq 15mg daily.  RA well controlled since changing to Rinvoq.  Patient requested a sooner appointment to discuss potential treatment changes because of history of recurrent shingles.  She has not had recurrent shingles since taking daily Valtrex.  We again reviewed the risks associated with DMARD, and in more detail with Rinvoq; after thorough discussion with different options proposed she has decided that she would like to remain on Rinvoq because she is doing well now and does not want to risk having any return of joint pain.  In the future, if changes needed, she will consider changing to Simponi IV or SQ.  Chronic illness.    - Continue methotrexate 25mg SQ once weekly (Rasuvo)  - Continue leucovorin 5 mg every 7 days, 24 hours after MTX dose.   - Continue Rinvoq 15 mg daily  - Labs every 3 months: CBC, Creatinine, Hepatic Panel, ESR, CRP    High risk medication requiring intensive toxicity monitoring at least quarterly    2. 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  Doing okay at this time.     3. Right hip pain: Status post WALTER twice in the past. Followed by [facility] orthopedics  as needed.  Symptoms are degenerative in nature.  Reportedly doing well at this time per patient    4. 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 Michele who is now at [facility].  Has been seen at [facility] and [facility].  She previously reported that a TENS unit was helpful at one point, then not helpful.   She does not want additional lumbar spine injections or back surgery.  However, she says now that she is reconsidering and would like to see the pain clinic for evaluation.  Alternatively she could see her spine surgeon but she does not wish to do so at this time.  Also note that she had improved with physical therapy exercises but stopped doing them and the back pain returned so she is willing to go back to physical therapy  - Physical therapy referral  - Pain management referral     5.  Thoracic back pain: Degenerative in nature.  And degenerative changes seen on 2021 chest CT.  Continue physical therapy exercises at home, as these are helpful.    6.  Lift chair request: Difficulty getting out of a chair and she would like to have a lift chair.  Advised physical therapy for general strengthening and assessment for lift chair    7.  Osteoporosis: was previously managed by endocrinology and she received reclast once in 2013, 2014, 2016. 12/12/2018 DEXA ordered by Dr. Vázquez showed osteoporosis.  Repeat DEXA on 2/2/2022 showed osteoporosis; no significant change of the hips; forearm osteoporosis but no previous evaluation of the forearm for comparison.  Reclast was restarted after sufficient drug holiday, with the first dose on 3/18/2021; again received in 3/21/2022, 3/21/2023, 3/25/2024.  DEXA being performed later today.  Chronic illness, stable.      - Reclast once yearly; next due on 3/25/2025 or later  - Continue vitamin D 1000 IU daily  - Continue calcium 1200mg daily from supplement and dietary sources    8. Left 1st toe  pain, history: 2 episodes of sudden onset left toe pain followed by diffuse left foot pain that made ambulation difficult.  Also with nodules over both Achilles tendons and the right elbow that are either rheumatoid nodules or tophi.  She reports having history of gout decades ago.  Denies ever being on colchicine or allopurinol.  Discussed colchicine to use if suspected gout flare occurs.  No flares since last seen so has not used colchicine.  - Colchicine: (MITIGARE) 0.6 MG capsule; Take 2 capsules (1.2 mg) by mouth once for 1 dose at the onset of a gout flare, followed by 1 capsule (0.6mg) 1 hour later.      9.  Chronic pain syndrome: Documented here for historical significance only.   Management per pain clinic and/or PCP    10.  Vaccinations: Vaccinations reviewed with Ms. Marshall.  Risks and benefits of vaccinations were discussed.    - Influenza: encouraged yearly vaccination, and to hold methotrexate for 1-2 weeks afterward  - [id number]: up to date  - [id number]: up to date  - Shingrix: Up to date   - COVID-19: Advised keeping updated, and to hold methotrexate and Rinvoq for 1-2 weeks afterward.  - RSV: Advised vaccination    11.  Nasal sores: Only occurs between December-March each year.  Reports using a humidifier on her CPAP.  Has seen ENT but was determined that it was likely vasomotor rhinitis with constant nasal drip that irritates the nose.  She is advised by ENT to use a humidifier at night and to use Atrovent nasal spray.  She will follow with ENT as needed for this issue.    Total minutes spent in evaluation with patient, documentation, , and review of pertinent studies and chart notes: 22  The longitudinal plan of care for the rheumatology problem(s) were addressed during this visit.  Due to added complexity of care, we will continue to support the patient and the subsequent management of this condition with ongoing continuity of care.       Ms. Marshall verbalized agreement with and  understanding of the rational for the diagnosis and treatment plan.  All questions were answered to best of my ability and the patient's satisfaction. Ms. Marshall was advised to contact the clinic with any questions that may arise after the clinic visit.      Thank you for involving me in the care of the patient    Return to clinic: 3-4 months    HPI   Ginger Marshall is a 83 year old female with a history of multiple foot surgeries, hand tendon transfers, MCP replacements (most recent being in August 2015), bilateral TKA, right WALTER, left distal radius fracture history, gout, s/p lumbar fusion, obstructive sleep apnea and uses a CPAP, osteoporosis and seropositive (RF+, CCP+) erosive rheumatoid arthritis who presents for follow-up of rheumatoid arthritis.      2/6/2024: RA controlled.  Nasal sores each year between December-March.  Uses a humidifier on her CPAP.  Has not noticed any change with methotrexate use.  No oral sores.  Chronic low back pain that radiates to the left leg; she would like to be evaluated in the pain clinic; she does not want to return to the spine surgeon because she does not want surgery.  He does not want to try reducing methotrexate because she recalls having worsening joint symptoms with dose reduction in the past.    4/4/2024: Here sooner than previously scheduled because she would like to discuss alternatives to Rinvoq considering history of recurrent shingles.  She is now taking Valtrex daily for suppressive benefits and has not had shingles since being on Valtrex.  She is not interested in changing medications at this time because she does not want to risk potentially having worsening joint pain; she says that her RA is well-controlled at this time.    Today, 5/17/2024: joint pain with weather changes; affecting the legs and low back.  States that she had injections with the pain clinic  without improvement.  Fell when getting out of her recliner; left ankle is swollen but not painful  and she says it is already improving so she is going to monitor it but if it worsens or becomes painful then she will go into the clinic to have it evaluated.  Felt better when she was doing physical therapy exercises for her back but she stopped doing physical therapy exercises for her back on her own and her back pain has worsened.  She would like to go to the pain clinic and physical therapy.  She would like a lift chair because she has difficulty getting out of her recliner; she says she spoke with her primary care provider about this but there is no action taken.    Denies fevers, chills, nausea, vomiting, constipation, diarrhea. No abdominal pain. No chest pain/pressure, palpitations, or shortness of breath.     Tobacco: quit in 1999  EtOH: 1 glass of wine every month at most  Drugs: None  Occupation: , retired     ROS   12 point review of system was completed and negative except as noted in the HPI     Active Problem List     Patient Active Problem List   Diagnosis    Rheumatoid arthritis involving right hand with positive rheumatoid factor (H)    History of colonic polyps    Multinodular goiter    Pulmonary nodule    PSEUDOPHAKIA OU    PVD (POSTERIOR VITREOUS DETACHMENT) OU    PXF (PSEUDOEXFOLIATION OF LENS CAPSULE) OD    GERD (gastroesophageal reflux disease)    Hyperlipidemia LDL goal <100    Advance Care Planning    Neuropathy    SHERRY (obstructive sleep apnea)-severe (AHI 35)    Anemia of chronic disease    Osteoporosis    Cornea guttata, ou    Conjunctival concretions    Stenosis, spinal, lumbar    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    MGD (meibomian gland dysfunction)    Prediabetes    Chronic bilateral low back pain without sciatica    Allergic state, subsequent encounter    Anxiety    DDD (degenerative disc disease), lumbar    Chronic right shoulder pain    Moderate episode of recurrent major depressive disorder (H)    Rheumatic mitral stenosis    Osteoarthritis of  first metatarsophalangeal (MTP) joint of right foot    History of bisphosphonate therapy    Morbid obesity (H)    Immunocompromised (H24)    Thoracic spine pain    Herpes simplex vulvovaginitis     Past Medical History     Past Medical History:   Diagnosis Date    Acute posthemorrhagic anemia 10/13/2012    Closed fracture of multiple ribs of left side, initial encounter 2019    Closed fracture of olecranon process of right ulna with routine healing 2023    Closed fracture of shaft of left ulna, unspecified fracture morphology, initial encounter 2023    COPD (chronic obstructive pulmonary disease) (H)     no longer occurring    Depressive disorder     Ex-smoker 1999    Gastroenteritis 2020    Gastroenteritis with norovirus    Herniated nucleus pulposus, L3-4 2017    Hip joint replacement by other means 07/10/2008    History of blood transfusion     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    History of total knee replacement 2009    Menopause 1989    late 40's    Migraine 2014    resolved    Multinodular goiter     Other chronic pain     joints    Other closed intra-articular fracture of distal end of left radius, initial encounter 2023    Pelvic fracture (H) 2014    Rheumatic mitral stenosis     Rheumatoid arteritis (H)     S/P lumbar fusion 2017    Sleep apnea     Vitamin B12 deficiency      Past Surgical History     Past Surgical History:   Procedure Laterality Date    ABDOMEN SURGERY      c sections    ARTHROSCOPY KNEE Left     ARTHROSCOPY KNEE RT/LT  2006    left    BACK SURGERY  2013    disc    BIOPSY BREAST      BREAST SURGERY      lumpectomy s    BREAST SURGERY      lumpectomy    CATARACT EXTRACTION Bilateral     CATARACT IOL, RT/LT Bilateral 2010 aproximately     SECTION  1966     SECTION  1972    COLONOSCOPY  2009,    COLONOSCOPY      FINGER SURGERY Right 2019    Procedure: DISTAL ULNA  RESECTION, RIGHT MIDDLE SILASTIC METACARPALPHALANGEAL EXCHANGE AND RIGHT ELBOW NODULE EXCISION.;  Surgeon: Hilario Redmond MD;  Location: [facility] OR;  Service: Orthopedics    FOOT SURGERY Left     GYN SURGERY  [age]    HAND SURGERY Right     HAND SURGERY Right     HC ESOPH/GAS REFLUX TEST W NASAL IMPED >1 HR  02/01/2012    Procedure:ESOPHAGEAL IMPEDENCE FUNCTION TEST WITH 24 HOUR PH GREATER THAN 1 HOUR; Surgeon:KAYKAY JULIO; Location:[facility]    IR LUMBAR EPIDURAL STEROID INJECTION      IR TRANSLAMINAR EPIDURAL LUMBAR INJ INCL IMAGING  05/02/2012    LESI L5-S1 at [facility]    LUMBAR FUSION      OPEN REDUCTION INTERNAL FIXATION ELBOW Right 2/12/2023    Procedure: OPEN REDUCTION INTERNAL FIXATION, RIGHT OLECRANON FRACTURE;  Surgeon: Pili Brock MD;  Location:  OR    OPEN REDUCTION INTERNAL FIXATION WRIST Left 2/12/2023    Procedure: OPEN REDUCTION INTERNAL FIXATION, LEFT DISTAL RADIUS FRACTURE;  Surgeon: Pili Brock MD;  Location:  OR    OPTICAL TRACKING SYSTEM FUSION POSTERIOR SPINE LUMBAR N/A 04/03/2017    Procedure: OPTICAL TRACKING SYSTEM FUSION SPINE POSTERIOR LUMBAR ONE LEVEL;  Surgeon: Anthony Michele MD;  Location:  OR    ORTHOPEDIC SURGERY  1991    left foot surgery    ORTHOPEDIC SURGERY  1995    R MCP surgery    ORTHOPEDIC SURGERY  2007    right knee total replacement    TOTAL HIP ARTHROPLASTY Right     TOTAL KNEE ARTHROPLASTY Left     TOTAL KNEE ARTHROPLASTY Right     ZZC ANESTH,TOTAL HIP ARTHROPLASTY  2010    Rt hip    ZZC HAND/FINGER SURGERY UNLISTED  11/2005    right hand    ZZC TOTAL KNEE ARTHROPLASTY  2006    left     Allergy     Allergies   Allergen Reactions    Abatacept Other (See Comments)     Severe headaches  Migraine    Celebrex [Roche-2 Inhibitors]      Ineffective    Celecoxib Unknown and Nausea    Levaquin [Levofloxacin] Fatigue     Severe body pain    Orencia [Abatacept]      Headache    Septra [Bactrim]     Sulfa Antibiotics Nausea and Vomiting      "\"deathly ill\"  Allergic to everything with Sulfa in it.    Sulfamethoxazole-Trimethoprim Nausea and Nausea and Vomiting    Tramadol Other (See Comments)     Headache  Migraine headache    Valdecoxib Unknown and Nausea     Made me \"very very ill\", might of been \"cramping\"    Adhesive Tape Rash     Plastic tape  Plastic tapes    Antihistamines, Chlorpheniramine-Type  [Antihistamines, Chlorpheniramine-Type] Anxiety and Other (See Comments)     Current Medication List     Current Outpatient Medications   Medication Sig Dispense Refill    acetaminophen (TYLENOL) 325 MG tablet Take 2 tablets (650 mg) by mouth every 6 hours as needed for mild pain Max tylenol 3000 mg in 24 hours      albuterol (PROAIR HFA/PROVENTIL HFA/VENTOLIN HFA) 108 (90 Base) MCG/ACT inhaler Inhale 2 puffs into the lungs every 6 hours as needed for shortness of breath, wheezing or cough      atorvastatin (LIPITOR) 40 MG tablet Take 1 tablet (40 mg) by mouth daily 90 tablet 3    busPIRone (BUSPAR) 10 MG tablet TAKE ONE TABLET BY MOUTH TWICE A  tablet 1    CALCIUM CITRATE PO Take 300 mg by mouth daily Take with meal that contains least amount of calcium      carboxymethylcellulose PF (REFRESH PLUS) 0.5 % ophthalmic solution Place 1 drop into both eyes 2 times daily as needed for dry eyes      Cholecalciferol (VITAMIN D-3 PO) Take 2,000 Units by mouth daily      Ferrous Gluconate 324 (37.5 Fe) MG TABS Take 1 tablet by mouth daily Started 6/2023      fluticasone (FLONASE) 50 MCG/ACT nasal spray Spray 1 spray into both nostrils daily 18.2 mL 0    hydrocortisone 2.5 % cream Apply topically 2 times daily For up to two weeks 20 g 0    ipratropium (ATROVENT) 0.06 % nasal spray Spray 2 sprays into both nostrils 4 times daily as needed for rhinitis (nasal drainage) 15 mL 11    leucovorin (WELLCOVORIN) 5 MG tablet Take 1 tablet (5 mg) by mouth every 7 days . Take 24 hours after taking Methotrexate each week. 13 tablet 2    loratadine (CLARITIN) 10 MG tablet " Take 1 tablet (10 mg) by mouth 2 times daily      Methotrexate, PF, (RASUVO) 25 MG/0.5ML autoinjector Inject 0.5 mLs (25 mg) Subcutaneous every 7 days . Hold for signs of infection, and seek medical attention. Take every Thursday. 2 mL 6    naproxen sodium (ANAPROX) 220 MG tablet Take 2 tablets (440 mg) by mouth daily as needed for moderate pain (4-6) (less than monthly use currently) (Patient taking differently: Take 440 mg by mouth daily as needed for moderate pain (using a couple times a month))      olopatadine (PATADAY) 0.2 % ophthalmic solution Place 1 drop into both eyes daily as needed Uses mostly in Spring      pantoprazole (PROTONIX) 40 MG EC tablet Take 1 tablet (40 mg) by mouth every morning (before breakfast) 90 tablet 3    sertraline (ZOLOFT) 100 MG tablet Take 1 tablet (100 mg) by mouth daily 90 tablet 1    UNABLE TO FIND MEDICATION NAME: Distilled water for CPAP      upadacitinib ER (RINVOQ) 15 MG tablet Take 1 tablet (15 mg) by mouth daily . Hold for any infection and seek medical attention. 30 tablet 6    valACYclovir (VALTREX) 500 MG tablet TAKE ONE TABLET BY MOUTH TWICE A  tablet 1    vitamin C (ASCORBIC ACID) 1000 MG TABS Take 1 tablet (1,000 mg) by mouth daily      zoledronic Acid (RECLAST) 5 MG/100ML SOLN infusion Inject 5 mg into the vein once Every year (next dose 2023)       No current facility-administered medications for this visit.     Social History   See HPI    Family History     Family History   Problem Relation Age of Onset    Arthritis Mother     Alzheimer Disease Mother     Hyperlipidemia Mother     Osteoporosis Mother     Heart Disease Father         MI ( from this)    Alcohol/Drug Father     Arthritis Sister     Hypertension Sister     Other Cancer Sister         Luekemia    Neurologic Disorder Sister         Schizophrenic    Hypertension Sister             Hyperlipidemia Sister     Mental Illness Sister         Bipolar    Glaucoma Daughter     Diabetes  "Son         type 1,     Diabetes Son         ,  cancer    Esophageal Cancer Son         Drank and smoked    Substance Abuse Son     Diabetes Other         type 2    Hyperlipidemia Other      No change in family history since the previous clinic visit.    Physical Exam     Temp Readings from Last 3 Encounters:   24 98.6  F (37  C)   24 98.6  F (37  C) (Oral)   24 97.4  F (36.3  C) (Oral)     BP Readings from Last 5 Encounters:   24 139/80   24 118/69   24 122/66   24 (!) 149/84   24 132/77     Pulse Readings from Last 1 Encounters:   24 82     Resp Readings from Last 1 Encounters:   24 17     Estimated body mass index is 34.28 kg/m  as calculated from the following:    Height as of 4/3/24: 1.651 m (5' 5\").    Weight as of 24: 93.4 kg (206 lb).      GEN: NAD. Healthy appearing adult.   HEENT:  Anicteric, noninjected sclera. No obvious external lesions of the ear and nose. Hearing intact.  PULM: No increased work of breathing  MSK:  Hands and wrists without swelling.    SKIN: No rash or jaundice seen  PSYCH: Alert. Appropriate.      Labs     CBC  Recent Labs   Lab Test 24  1001 24  1036 23  1413 21  1054 05/10/21  1023 21  1019 20  1028   WBC 7.1 5.3 4.3   < > 5.0 5.7 5.7   RBC 3.72* 3.65* 3.53*   < > 3.39* 3.81 3.91   HGB 12.6 12.2 11.9   < > 11.3* 12.5 12.0   HCT 38.1 37.6 36.0   < > 35.6 38.9 37.8   * 103* 102*   < > 105* 102* 97   RDW 14.4 14.2 14.7   < > 14.3 15.2* 16.1*    395 379   < > 333 335 361   MCH 33.9* 33.4* 33.7*   < > 33.3* 32.8 30.7   MCHC 33.1 32.4 33.1   < > 31.7 32.1 31.7   NEUTROPHIL 68 60 52   < > 57.7 65.8 60.5   LYMPH 16 21 28   < > 18.5 17.0 20.4   MONOCYTE 12 15 17   < > 17.3 12.1 12.8   EOSINOPHIL 3 4 2   < > 6.3 4.9 6.1   BASOPHIL 0 0 0   < > 0.2 0.2 0.2   ANEU  --   --   --   --  2.9 3.8 3.5   ALYM  --   --   --   --  0.9 1.0 1.2   MARYURI  --   --   --   --  0.9 " 0.7 0.7   AEOS  --   --   --   --  0.3 0.3 0.4   ABAS  --   --   --   --  0.0 0.0 0.0   ANEUTAUTO 4.9 3.2 2.2   < >  --   --   --    ALYMPAUTO 1.1 1.1 1.2   < >  --   --   --    AMONOAUTO 0.8 0.8 0.7   < >  --   --   --    AEOSAUTO 0.2 0.2 0.1   < >  --   --   --    ABSBASO 0.0 0.0 0.0   < >  --   --   --     < > = values in this interval not displayed.     CMP  Recent Labs   Lab Test 05/14/24  1001 03/25/24  1018 02/06/24  1036 12/05/23  1413 08/22/23  1005 06/20/23  1329 04/25/23  1008 02/22/23  1018 02/12/23  1633 08/18/21  1054 05/10/21  1023 03/18/21  1350 02/16/21  1019 12/18/20  0923 11/16/20  1028   NA  --   --   --   --   --  142  --  142 136   < >  --   --   --   --   --    POTASSIUM  --   --   --   --   --  3.5  --  4.0 3.8   < >  --   --   --   --   --    CHLORIDE  --   --   --   --   --  105  --  103 102   < >  --   --   --   --   --    CO2  --   --   --   --   --  24  --  25 25   < >  --   --   --   --   --    ANIONGAP  --   --   --   --   --  13  --  14 9   < >  --   --   --   --   --    GLC  --   --   --   --   --  123*  --  104* 159*   < >  --   --   --   --   --    BUN  --   --   --   --   --  9.6  --  11.0 10.3   < >  --   --   --   --   --    CR 0.55 0.61 0.55 0.55   < > 0.60 0.60 0.52 0.52   < > 0.59  --  0.62  --  0.60   GFRESTIMATED >90 89 >90 >90   < > 90 90 >90 >90   < > 87  --  86  --  87   GFRESTBLACK  --   --   --   --   --   --   --   --   --   --  >90  --  >90  --  >90   BRANDEE  --  9.2  --   --   --  9.8 9.8 9.4 8.6*   < >  --    < >  --    < >  --    BILITOTAL 0.4  --  0.3 0.3   < > 0.3 0.2  --   --    < > 0.3  --  0.4  --  0.4   ALBUMIN 4.5  --  4.5 4.4   < > 4.6 4.5  --   --    < > 3.6  --  4.2  --  4.1   PROTTOTAL 7.9  --  7.6 7.5   < > 8.1 7.7  --   --    < > 7.0  --  8.0  --  7.6   ALKPHOS 50  --  40 47   < > 50 53  --   --    < > 45  --  44  --  47   AST 29  --  26 37   < > 29 29  --   --    < > 22  --  21  --  25   ALT 23  --  20 29   < > 21 22  --   --    < > 36  --  34  --  36     < > = values in this interval not displayed.     Calcium/VitaminD  Recent Labs   Lab Test 03/25/24  1018 06/20/23  1329 06/05/23  0941 04/25/23  1008 02/11/23  0819 01/16/23  1122   BRANDEE 9.2 9.8  --  9.8   < > 9.2   VITDT  --   --  60 66  --  95*    < > = values in this interval not displayed.     ESR/CRP  Recent Labs   Lab Test 05/14/24  1001 02/06/24  1036 08/22/23  1005 07/12/22  1112 04/29/22  1000 02/04/22  0832 12/08/21  1053   SED 33* 15 16   < > 15 14 16   CRP  --   --   --   --  <2.9 <2.9 <2.9   CRPI 3.53 <3.00 <3.00   < >  --   --   --     < > = values in this interval not displayed.     Lipid Panel  Recent Labs   Lab Test 01/29/24  1031 01/16/23  1122 12/08/21  1053   CHOL 210* 185 184   TRIG 133 124 101   HDL 78 74 77   * 86 87   NHDL 132* 111 107     Hepatitis B  Recent Labs   Lab Test 10/20/22  1256   HBCAB Nonreactive   HEPBANG Nonreactive     Hepatitis C  Recent Labs   Lab Test 10/20/22  1256   HCVAB Nonreactive     Tuberculosis Screening  Recent Labs   Lab Test 02/06/24  1036 02/16/23  0000 10/20/22  1256 08/18/21  1054 05/07/18  1033   TBRES Negative Indeterminate* Negative   < >  --    TBRSLT  --   --   --   --  Negative   TBAGN  --   --   --   --  0.00    < > = values in this interval not displayed.       Immunization History     Immunization History   Administered Date(s) Administered    COVID-19 MONOVALENT 12+ (Pfizer) 02/09/2021, 03/03/2021    Flu, Unspecified 10/20/2013    Influenza (High Dose) 3 valent vaccine 10/28/2013, 09/23/2014, 11/11/2015, 09/23/2016, 09/24/2019    Influenza (IIV3) PF 10/12/1999, 10/26/2001, 10/19/2002, 10/30/2003, 10/28/2004, 10/21/2005, 10/26/2007, 10/21/2009, 10/05/2011, 10/13/2012    Influenza Vaccine (Flucelvax Quadrivalent) 09/28/2017, 09/27/2018    Influenza Vaccine 65+ (Fluzone HD) 10/07/2021, 11/16/2022, 12/11/2023    Influenza Vaccine >6 months,quad, PF 09/24/2020    Influenza Vaccine, 6+MO IM (QUADRIVALENT W/PRESERVATIVES) 11/16/2022    Influenza,  seasonal, injectable, PF 10/24/2006, 11/14/2007, 10/22/2008, 10/21/2009    Mantoux Tuberculin Skin Test 11/03/2006    Pneumo Conj 13-V (2010&after) 07/29/2015    Pneumococcal 23 valent 06/26/2000, 10/26/2001, 06/01/2007, 06/02/2010    TD,PF 7+ (Tenivac) 12/10/2008, 07/20/2009    Tdap (Adult) Unspecified Formulation 12/12/2018    Zoster recombinant adjuvanted (SHINGRIX) 12/12/2018, 02/14/2019          Chart documentation done in part with Dragon Voice recognition Software. Although reviewed after completion, some word and grammatical error may remain.      Video-Visit Details    Type of service:  Video Visit    Video Start Time: 10:30 AM    Video End Time: 10:48 AM    Originating Location (pt. Location): Home, [place]    Distant Location (provider location):  off site, [place]    Platform used for Video Visit: Gordo Byers MD

## 2024-05-21 ENCOUNTER — VIRTUAL VISIT (OUTPATIENT)
Dept: PSYCHOLOGY | Facility: CLINIC | Age: 84
End: 2024-05-21
Payer: COMMERCIAL

## 2024-05-21 DIAGNOSIS — F33.1 MODERATE EPISODE OF RECURRENT MAJOR DEPRESSIVE DISORDER (H): Primary | ICD-10-CM

## 2024-05-21 PROCEDURE — 90837 PSYTX W PT 60 MINUTES: CPT | Mod: 95

## 2024-05-21 NOTE — PROGRESS NOTES
[facility]                                     Progress Note    Patient Name: Ginger Marshall  Date: 5/21/24         Service Type: Individual      Session Start Time: 9:00 am  Session End Time: 9:53 am     Session Length: 53 min    Session #: 16    Answers submitted by the patient for this visit:  Patient Health Questionnaire (Submitted on 9/26/2023)  If you checked off any problems, how difficult have these problems made it for you to do your work, take care of things at home, or get along with other people?: Somewhat difficult  PHQ9 TOTAL SCORE: 4    Attendees: Client attended alone    Service Modality:  Video Visit:      Provider verified identity through the following two step process.  Patient provided:  Patient is known previously to provider    Telemedicine Visit: The patient's condition can be safely assessed and treated via synchronous audio and visual telemedicine encounter.      Reason for Telemedicine Visit: Patient convenience (e.g. access to timely appointments / distance to available provider)    Originating Site (Patient Location): Patient's home    Distant Site (Provider Location): Provider Remote Setting- Home Office    Consent:  The patient/guardian has verbally consented to: the potential risks and benefits of telemedicine (video visit) versus in person care; bill my insurance or make self-payment for services provided; and responsibility for payment of non-covered services.     Patient would like the video invitation sent by:  My Chart    Mode of Communication:  Video Conference via [facility]well    Distant Location (Provider):  Off-site    As the provider I attest to compliance with applicable laws and regulations related to telemedicine.    DATA  Interactive Complexity: No     Crisis: No    Extended Session (53+ minutes): PROLONGED SERVICE IN THE OUTPATIENT SETTING REQUIRING DIRECT (FACE-TO-FACE) PATIENT CONTACT BEYOND THE USUAL SERVICE:    - Longer session due to limited access  to mental health appointments and necessity to address patient's distress / complexity  Interactive Complexity: No  Crisis: No      Progress Since Last Session (Related to Symptoms / Goals / Homework):   Symptoms: No change   , low energy    Homework: Partially completed      Episode of Care Goals: Minimal progress - ACTION (Actively working towards change); Intervened by reinforcing change plan / affirming steps taken     Current / Ongoing Stressors and Concerns:  Vertigo resolved, head pain, back pain worsening, low energy. Receiving [facility] for support, impatience with responses. Low quality sleep, even with CPAP coming unhooked-waking up tired, nap in afternoon. Focus on committees,  socializing with various [employer] groups.   Ongoing: Grieving loss of adult son who was blind and lived alone, fell and passed away, second loss of an adult/child. Regrets about parenting.      Treatment Objective(s) Addressed in This Session:    Practice boundaries and radical acceptance of reality regarding sister, sons, reality of accident  Improve quantity and quality of night time sleep / decrease daytime naps      Intervention:  Supportive therapy: Provided active listening and validation. Provided grounding/mindfulness skills    Motivational Interviewing-MAINTAIN  Target Behavior:  radical acceptance of reality, gratitude ,   Proactive communication with providers, [facility]    MI Intervention: Expressed Empathy/Understanding, Supported Autonomy, Collaboration, Evocation and Open-ended questions     Change Talk Expressed by the Patient: Desire to change Reasons to change Activation    Provider Response to Change Talk: E - Evoked more info from patient about behavior change and A - Affirmed patient's thoughts, decisions, or attempts at behavior change    Assessments completed prior to visit:  DA  12/5/22  The following assessments were completed by patient for this visit:  PHQ2:       2/27/2023     8:50 AM  2/24/2023     6:48 AM 2/20/2023     5:39 PM 1/15/2023    11:54 AM 11/16/2022     1:33 PM 8/5/2022     8:11 AM 7/6/2022     4:18 PM   PHQ-2 ( 1999 [employer])   Q1: Little interest or pleasure in doing things 0 1 1 1 1 3    Q2: Feeling down, depressed or hopeless 0 1 1 1 1 0    PHQ-2 Score 0 2 2 2 2 3    Q1: Little interest or pleasure in doing things  Several days Several days Several days Several days Nearly every day    Q2: Feeling down, depressed or hopeless  Several days Several days Several days Several days Not at all    PHQ-2 Score  2 2 2 2 3 Incomplete     PHQ9:       11/27/2023    12:37 PM 1/16/2024     6:18 PM 1/22/2024     8:44 AM 2/28/2024    10:13 AM 3/19/2024    10:05 AM 4/2/2024     9:02 PM 4/21/2024     1:24 PM   PHQ-9 SCORE   PHQ-9 Total Score MyChart 6 (Mild depression) 4 (Minimal depression) 5 (Mild depression) 12 (Moderate depression) 8 (Mild depression) 3 (Minimal depression) 5 (Mild depression)   PHQ-9 Total Score 6 4 5 12 8 3 5     Patient Health Questionnaire (Submitted on 9/26/2023)  If you checked off any problems, how difficult have these problems made it for you to do your work, take care of things at home, or get along with other people?: Somewhat difficult  PHQ9 TOTAL SCORE: 4  GAD2:       8/23/2023     8:31 AM 9/23/2023    10:05 AM 11/19/2023    10:24 AM 1/15/2024    10:35 AM 2/16/2024     8:34 AM 3/13/2024     6:28 PM 4/21/2024     1:25 PM   SPRING-2   Feeling nervous, anxious, or on edge 0 0 0 1 1 1 0   Not being able to stop or control worrying 0 0 0 0 0 0 0   SPRING-2 Total Score 0 0 0 1 1 1 0     GAD7:       6/16/2020     9:54 AM 7/6/2022     4:18 PM 8/5/2022     8:11 AM 11/16/2022     1:33 PM 6/5/2023     2:31 PM 2/28/2024    10:14 AM 4/2/2024     9:05 PM   SPRING-7 SCORE   Total Score  2 (minimal anxiety) 5 (mild anxiety) 2 (minimal anxiety)  5 (mild anxiety) 1 (minimal anxiety)   Total Score 7 2 5 2 3 5 1     CAGE-AID:       12/5/2022     9:28 AM   CAGE-AID Total Score   Total Score 0    Total Score MyChart 0 (A total score of 2 or greater is considered clinically significant)     PROMIS 10-Global Health (only subscores and total score):       11/15/2018     1:00 PM 3/25/2022     7:34 AM 12/5/2022     9:28 AM 3/12/2023    10:38 AM 8/23/2023     8:34 AM 12/14/2023     6:28 AM 3/13/2024     6:31 PM   PROMIS-10 Scores Only   Global Mental Health Score 12 12 8    8 8    8 12 13 11   Global Physical Health Score 12 12 13    13 11    11 12 12 11   PROMIS TOTAL - SUBSCORES 24 24 21    21 19    19 24 25 22     [place] Suicide Severity Rating Scale (Lifetime/Recent)      12/5/2022    11:00 AM   [place] Suicide Severity Rating (Lifetime/Recent)   Q1 Wished to be Dead (Past Month) no   Q2 Suicidal Thoughts (Past Month) no   Q3 Suicidal Thought Method no   Q4 Suicidal Intent without Specific Plan no   Q5 Suicide Intent with Specific Plan no   Q6 Suicide Behavior (Lifetime) no   Level of Risk per Screen low risk         ASSESSMENT: Current Emotional / Mental Status (status of significant symptoms):   Risk status (Self / Other harm or suicidal ideation)   Patient denies current fears or concerns for personal safety.   Patient denies current or recent suicidal ideation or behaviors.   Patient denies current or recent homicidal ideation or behaviors.   Patient denies current or recent self injurious behavior or ideation.   Patient denies other safety concerns.   Patient reports there has been no change in risk factors since their last session.     Patient reports there has been no change in protective factors since their last session.     Recommended that patient call 911 or go to the local ED should there be a change in any of these risk factors.     Appearance:   Appropriate    Eye Contact:   Good    Psychomotor Behavior: Normal    Attitude:   Pleasant Attentive   Orientation:   All   Speech    Rate / Production: Emotional Normal     Volume:  Normal    Mood:    Irritable  Normal   Affect:    Appropriate     Thought Content:  Clear    Thought Form:  Coherent  Logical    Insight:    Good      Medication Review:   No changes to current psychiatric medication(s)     Medication Compliance:   Yes     Changes in Health Issues:   None reported     Chemical Use Review:   Substance Use: Chemical use reviewed, no active concerns identified      Tobacco Use: No current tobacco use.      Diagnosis:  1. Moderate episode of recurrent major depressive disorder (H)      Collateral Reports Completed:   Not Applicable    PLAN: (Patient Tasks / Therapist Tasks / Other)   Embrace radical acceptance and boundaries. Use proactive communication with providers, family, try grounding /mindfulness, loving kindness toward self    HECTOR Springer 5/21/2024                                                                                                  Individual Treatment Plan    Patient's Name: Ginger Marshall  YOB: 1940    Date of Creation: 2/8/23  Date Treatment Plan Last Reviewed/Revised:   4/22/24    DSM5 Diagnoses: 296.31 (F33.0) Major Depressive Disorder, Recurrent Episode, Mild With anxious distress  Psychosocial / Contextual Factors: aging, losses, generational trauma  PROMIS (reviewed every 90 days):   21  12/5/22    Referral / Collaboration:  Referral to another professional/service is not indicated at this time..    Anticipated number of session for this episode of care: 6-9 sessions  Anticipation frequency of session: Every other week  Anticipated Duration of each session: 38-52 minutes  Treatment plan will be reviewed in 90 days or when goals have been changed.     MeasurableTreatment Goal(s) related to diagnosis / functional impairment(s)  Goal 1: Patient will experience an improvement in mood and functioning as evidenced by assessment scores, self report and clinician observation    I will know I've met my goal when things feel better (paraphrase).      Objective #A (Patient Action)    Patient will increase  understanding of steps in the grief process   Talk to others about losses  Use prayer practices and jena community to support well being.   Practice boundaries and radical acceptance of reality regarding sister sons, reality of accident   Engage in social activities at [facility]  Status: 4/22/24    Intervention(s)  Therapist will teach CBT, DBT  and support grief processing skills, prayer practices .    Objective #B  Patient will Increase interest, engagement, and pleasure in doing things  Decrease frequency and intensity of feeling down, depressed, hopeless  Improve quantity and quality of night time sleep / decrease daytime naps  Feel less tired and more energy during the day   Improve diet, appetite, mindful eating, and / or meal planning  Identify negative self-talk and behaviors: challenge core beliefs, myths, and actions  Improve concentration, focus, and mindfulness in daily activities   Feel less fidgety, restless or slow in daily activities / interpersonal interactions.  Status: 4/22/2024    Intervention(s)  Therapist will  teach cbt, dbt,MI, mindfulness and behavioral activation and assign homework .      Patient has reviewed and agreed to the above plan.      R[place]ío Arenas, HECTOR  4/22/2024

## 2024-05-22 ENCOUNTER — MYC MEDICAL ADVICE (OUTPATIENT)
Dept: FAMILY MEDICINE | Facility: CLINIC | Age: 84
End: 2024-05-22

## 2024-05-22 ENCOUNTER — THERAPY VISIT (OUTPATIENT)
Dept: PHYSICAL THERAPY | Facility: CLINIC | Age: 84
End: 2024-05-22
Attending: INTERNAL MEDICINE
Payer: COMMERCIAL

## 2024-05-22 DIAGNOSIS — G89.29 CHRONIC BILATERAL LOW BACK PAIN WITHOUT SCIATICA: Primary | Chronic | ICD-10-CM

## 2024-05-22 DIAGNOSIS — M54.50 CHRONIC BILATERAL LOW BACK PAIN WITHOUT SCIATICA: Primary | Chronic | ICD-10-CM

## 2024-05-22 DIAGNOSIS — M54.42 CHRONIC MIDLINE LOW BACK PAIN WITH LEFT-SIDED SCIATICA: ICD-10-CM

## 2024-05-22 DIAGNOSIS — G89.29 CHRONIC MIDLINE LOW BACK PAIN WITH LEFT-SIDED SCIATICA: ICD-10-CM

## 2024-05-22 DIAGNOSIS — Z74.09 MOBILITY IMPAIRED: ICD-10-CM

## 2024-05-22 PROCEDURE — 97112 NEUROMUSCULAR REEDUCATION: CPT | Mod: GP | Performed by: PHYSICAL THERAPIST

## 2024-05-22 PROCEDURE — 97530 THERAPEUTIC ACTIVITIES: CPT | Mod: GP | Performed by: PHYSICAL THERAPIST

## 2024-05-22 PROCEDURE — 97110 THERAPEUTIC EXERCISES: CPT | Mod: GP | Performed by: PHYSICAL THERAPIST

## 2024-05-22 NOTE — PROGRESS NOTES
05/22/24 0500   Appointment Info   Signing clinician's name / credentials Ministerio Tran DPT   Total/Authorized Visits 8   Visits Used 3   Medical Diagnosis LBP   PT Tx Diagnosis LBP   Quick Adds Certification   Progress Note/Certification   Start of Care Date 04/08/24   Onset of illness/injury or Date of Surgery 10/08/23   Therapy Frequency 1 x week   Predicted Duration 8 week   Certification date from 04/08/24   Certification date to 06/03/24   Progress Note Completed Date 04/15/24       Present No   GOALS   PT Goals 2   PT Goal 1   Goal Description Pt will be able to tolerate standing for 30 minutes   Rationale to maximize safety and independence with performance of ADLs and functional tasks;to maximize safety and independence within the home;to maximize safety and independence within the community   Goal Progress has had a flare up and a fall causing increased back pain-unable to stand without assitance santiago   Target Date 06/03/24   Subjective Report   Subjective Report Patient reports that when she was at PT about a month ago she was feeling really good. But then about a week or 2 after the pain started to increase on the left side of the low back. States it is limiting her walking, standing and waking her up at night. She also had a fall about a week ago, but doesnt feel that has caused her pain to be any worse. Current pain is 4/10. Has tried doing some of the exercises but does not feel it is helping.   Objective Measures   Objective Measures Objective Measure 1;Objective Measure 2   Objective Measure 1   Objective Measure poor lower abdominal activation   Details LROM: flexion 50% with pain, Ext 25% pain   Treatment Interventions (PT)   Interventions Therapeutic Procedure/Exercise;Therapeutic Activity;Neuromuscular Re-education;Manual Therapy   Therapeutic Procedure/Exercise   PTRx Ther Proc 1 Sitting Flexion   PTRx Ther Proc 1 - Details 3-5 reps hold 10 seconds 3 sets  "decreased pain-discussed tightening abdominals with bending and sitting back up   Therapeutic Procedures: strength, endurance, ROM, flexibility minutes (10031) 15   Therapeutic Procedures Ther Proc 2;Ther Proc 3   Ther Proc 1 Education   Ther Proc 1 - Details educated regarding role of therapeutic exercise, POC, expected goal in therapy, nature of condition   Ther Proc 2 Pulldowns   Ther Proc 2 - Details nt   PTRx Ther Proc 2 Step Standing Flexion   PTRx Ther Proc 2 - Details 3-5 reps hold 10 seconds   PTRx Ther Proc 3 Supine Lumbar Hip Roll   PTRx Ther Proc 3 - Details nt pt did not want to lie down due to intensity of back pain   Skilled Intervention VC's and MC's   Patient Response/Progress tolerated well and pain decreased   Therapeutic Activity   Therapeutic Activities: dynamic activities to improve functional performance minutes (17071) 10   Ther Act 1 Worked on sit to stand transfers also discussed using a lighter walker to get in and out of her car to help decrease pain also discussed tyring to limit twisting when lifting the walker   Patient Response/Progress good understanding and has another walker at home that she will try it with   Neuromuscular Re-education   Neuromuscular re-ed of mvmt, balance, coord, kinesthetic sense, posture, proprioception minutes (42924) 10   Skilled Intervention pain avoidance activities/positions   Patient Response/Progress tolerated well and dec pain   Neuromuscular Re-education Neuro Re-ed 2   Neuro Re-ed 1 Posture education, sitting, standing, lifting   Neuro Re-ed 2 TA activation in sitting   Neuro Re-ed 2 - Details 5\" holds working on not holding her breath   Manual Therapy   Manual Therapy 1 STM seated to L low back   Manual Therapy 1 - Details very tender and increased tension   Patient Response/Progress pain better after   Manual Therapy: Mobilization, MFR, MLD, friction massage minutes ([id number]) 5   Education   Learner/Method " Patient;Listening;Demonstration;Pictures/Video   Education Comments Pt educated on plan of care   Plan   Updates to plan of care pt will reach out to PCP about scheduling an appointment about lift chair   Plan for next session continue to progress strength and ROM   Total Session Time   Timed Code Treatment Minutes 40   Total Treatment Time (sum of timed and untimed services) 40       PLAN  Continue therapy per current plan of care.    Beginning/End Dates of Progress Note Reporting Period:  04/15/24 to 05/22/2024    Referring Provider:  Nico Byers

## 2024-05-22 NOTE — TELEPHONE ENCOUNTER
Per 4/8/24 TE:    All Conversations  (Newest Message First)  April 8, 2024  Isaac Ibrahim MA Martha faxed to [facility] 4/8/24.        Isaac Ibrahim CMA                       TD    4/8/24  2:28 PM  Duy Leyva MD routed this conversation to Isaac Ibrahim MA Dick, Toni M., MD    Ordered lift chair and adjustable bed through face-to-face visit last week.     Please fax DME orders.     Thanks,     MD KAROL Ralph [facility] Center[facility]  4/8/2024             Will respond to patients MyChart with info- it appears this has already been completed.      Carolyn SORENSON, - Flex  Primary Care- ADS, [facility] [place]  [facility]

## 2024-05-22 NOTE — PROGRESS NOTES
05/22/24 0500   Appointment Info   Signing clinician's name / credentials Ministerio Tran DPT   Total/Authorized Visits 8   Visits Used 3   Medical Diagnosis LBP   PT Tx Diagnosis LBP   Quick Adds Certification   Progress Note/Certification   Start of Care Date 04/08/24   Onset of illness/injury or Date of Surgery 10/08/23   Therapy Frequency 1 x week   Predicted Duration 8 week   Certification date from 04/08/24   Certification date to 06/03/24   Progress Note Completed Date 04/15/24       Present No   GOALS   PT Goals 2   PT Goal 1   Goal Description Pt will be able to tolerate standing for 30 minutes   Rationale to maximize safety and independence with performance of ADLs and functional tasks;to maximize safety and independence within the home;to maximize safety and independence within the community   Goal Progress has had a flare up and a fall causing increased back pain-unable to stand without assitance currenlty   Target Date 06/03/24   Subjective Report   Subjective Report Pain has increased in the last couple weeks on the left side. Has been trying to do the exxercises from before and that has not helped. walking is limited with walker. 4/10. at worst wakig up at night   Objective Measures   Objective Measures Objective Measure 1;Objective Measure 2   Objective Measure 1   Objective Measure poor lower abdominal activation   Details LROM: flexion 50% with pain, Ext 25% pain   Treatment Interventions (PT)   Interventions Therapeutic Procedure/Exercise;Therapeutic Activity;Neuromuscular Re-education;Manual Therapy   Therapeutic Procedure/Exercise   PTRx Ther Proc 1 Sitting Flexion   PTRx Ther Proc 1 - Details 3-5 reps hold 10 seconds 3 sets decreased pain-discussed tightening abdominals with bending and sitting back up   Therapeutic Procedures: strength, endurance, ROM, flexibility minutes ([id number]) 15   Therapeutic Procedures Ther Proc 2;Ther Proc 3   Ther Proc 1 Education   Ther Proc 1 -  "Details educated regarding role of therapeutic exercise, POC, expected goal in therapy, nature of condition   Ther Proc 2 Pulldowns   Ther Proc 2 - Details nt   PTRx Ther Proc 2 Step Standing Flexion   PTRx Ther Proc 2 - Details 3-5 reps hold 10 seconds   PTRx Ther Proc 3 Supine Lumbar Hip Roll   PTRx Ther Proc 3 - Details nt pt did not want to lie down due to intensity of back pain   Skilled Intervention VC's and MC's   Patient Response/Progress tolerated well and pain decreased   Therapeutic Activity   Therapeutic Activities: dynamic activities to improve functional performance minutes (86672) 10   Ther Act 1 Worked on sit to stand transfers also discussed using a lighter walker to get in and out of her car to help decrease pain also discussed tyring to limit twisting when lifting the walker   Patient Response/Progress good understanding and has another walker at home that she will try it with   Neuromuscular Re-education   Neuromuscular re-ed of mvmt, balance, coord, kinesthetic sense, posture, proprioception minutes (18868) 10   Skilled Intervention pain avoidance activities/positions   Patient Response/Progress tolerated well and dec pain   Neuromuscular Re-education Neuro Re-ed 2   Neuro Re-ed 1 Posture education, sitting, standing, lifting   Neuro Re-ed 2 TA activation in sitting   Neuro Re-ed 2 - Details 5\" holds working on not holding her breath   Manual Therapy   Manual Therapy 1 STM seated to L low back   Manual Therapy 1 - Details very tender and increased tension   Patient Response/Progress pain better after   Manual Therapy: Mobilization, MFR, MLD, friction massage minutes (03435) 5   Education   Learner/Method Patient;Listening;Demonstration;Pictures/Video   Education Comments Pt educated on plan of care   Plan   Updates to plan of care pt will reach out to PCP about scheduling an appointment about lift chair   Plan for next session continue to progress strength and ROM   Total Session Time   Timed " Code Treatment Minutes 40   Total Treatment Time (sum of timed and untimed services) 40         PLAN  Continue therapy per current plan of care.    Beginning/End Dates of Progress Note Reporting Period:  04/15/24 to 05/22/2024    Referring Provider:  Nico Byers

## 2024-05-29 ENCOUNTER — THERAPY VISIT (OUTPATIENT)
Dept: PHYSICAL THERAPY | Facility: CLINIC | Age: 84
End: 2024-05-29
Payer: COMMERCIAL

## 2024-05-29 DIAGNOSIS — G89.29 CHRONIC BILATERAL LOW BACK PAIN WITHOUT SCIATICA: Primary | Chronic | ICD-10-CM

## 2024-05-29 DIAGNOSIS — M54.50 CHRONIC BILATERAL LOW BACK PAIN WITHOUT SCIATICA: Primary | Chronic | ICD-10-CM

## 2024-05-29 PROCEDURE — 97112 NEUROMUSCULAR REEDUCATION: CPT | Mod: GP | Performed by: PHYSICAL THERAPIST

## 2024-05-29 PROCEDURE — 97530 THERAPEUTIC ACTIVITIES: CPT | Mod: GP | Performed by: PHYSICAL THERAPIST

## 2024-05-29 PROCEDURE — 97110 THERAPEUTIC EXERCISES: CPT | Mod: GP | Performed by: PHYSICAL THERAPIST

## 2024-05-31 ASSESSMENT — ANXIETY QUESTIONNAIRES
5. BEING SO RESTLESS THAT IT IS HARD TO SIT STILL: NOT AT ALL
4. TROUBLE RELAXING: NOT AT ALL
IF YOU CHECKED OFF ANY PROBLEMS ON THIS QUESTIONNAIRE, HOW DIFFICULT HAVE THESE PROBLEMS MADE IT FOR YOU TO DO YOUR WORK, TAKE CARE OF THINGS AT HOME, OR GET ALONG WITH OTHER PEOPLE: NOT DIFFICULT AT ALL
7. FEELING AFRAID AS IF SOMETHING AWFUL MIGHT HAPPEN: NOT AT ALL
2. NOT BEING ABLE TO STOP OR CONTROL WORRYING: NOT AT ALL
6. BECOMING EASILY ANNOYED OR IRRITABLE: SEVERAL DAYS
8. IF YOU CHECKED OFF ANY PROBLEMS, HOW DIFFICULT HAVE THESE MADE IT FOR YOU TO DO YOUR WORK, TAKE CARE OF THINGS AT HOME, OR GET ALONG WITH OTHER PEOPLE?: NOT DIFFICULT AT ALL
GAD7 TOTAL SCORE: 1
3. WORRYING TOO MUCH ABOUT DIFFERENT THINGS: NOT AT ALL
GAD7 TOTAL SCORE: 1
7. FEELING AFRAID AS IF SOMETHING AWFUL MIGHT HAPPEN: NOT AT ALL
1. FEELING NERVOUS, ANXIOUS, OR ON EDGE: NOT AT ALL

## 2024-05-31 ASSESSMENT — PAIN SCALES - PAIN ENJOYMENT GENERAL ACTIVITY SCALE (PEG)
AVG_PAIN_PASTWEEK: 5
INTERFERED_ENJOYMENT_LIFE: 6
AVG_PAIN_PASTWEEK: 5
INTERFERED_GENERAL_ACTIVITY: 6
PEG_TOTALSCORE: 5.67
PEG_TOTALSCORE: 5.67
INTERFERED_ENJOYMENT_LIFE: 6
INTERFERED_GENERAL_ACTIVITY: 6

## 2024-06-04 ENCOUNTER — OFFICE VISIT (OUTPATIENT)
Dept: PALLIATIVE MEDICINE | Facility: CLINIC | Age: 84
End: 2024-06-04
Attending: INTERNAL MEDICINE
Payer: COMMERCIAL

## 2024-06-04 VITALS — SYSTOLIC BLOOD PRESSURE: 133 MMHG | OXYGEN SATURATION: 96 % | DIASTOLIC BLOOD PRESSURE: 79 MMHG | HEART RATE: 85 BPM

## 2024-06-04 DIAGNOSIS — M05.741 RHEUMATOID ARTHRITIS INVOLVING RIGHT HAND WITH POSITIVE RHEUMATOID FACTOR (H): Chronic | ICD-10-CM

## 2024-06-04 DIAGNOSIS — M51.369 DDD (DEGENERATIVE DISC DISEASE), LUMBAR: Chronic | ICD-10-CM

## 2024-06-04 DIAGNOSIS — M54.6 THORACIC SPINE PAIN: ICD-10-CM

## 2024-06-04 DIAGNOSIS — M54.42 CHRONIC MIDLINE LOW BACK PAIN WITH LEFT-SIDED SCIATICA: Primary | ICD-10-CM

## 2024-06-04 DIAGNOSIS — G47.33 OSA (OBSTRUCTIVE SLEEP APNEA): Chronic | ICD-10-CM

## 2024-06-04 DIAGNOSIS — G89.29 CHRONIC MIDLINE LOW BACK PAIN WITH LEFT-SIDED SCIATICA: Primary | ICD-10-CM

## 2024-06-04 DIAGNOSIS — M48.061 SPINAL STENOSIS OF LUMBAR REGION WITHOUT NEUROGENIC CLAUDICATION: ICD-10-CM

## 2024-06-04 PROCEDURE — 99204 OFFICE O/P NEW MOD 45 MIN: CPT | Performed by: NURSE PRACTITIONER

## 2024-06-04 RX ORDER — PREGABALIN 50 MG/1
50 CAPSULE ORAL 3 TIMES DAILY
Qty: 270 CAPSULE | Refills: 1 | Status: SHIPPED | OUTPATIENT
Start: 2024-07-03

## 2024-06-04 RX ORDER — PREGABALIN 50 MG/1
CAPSULE ORAL
Qty: 75 CAPSULE | Refills: 0 | Status: SHIPPED | OUTPATIENT
Start: 2024-06-04 | End: 2024-08-06

## 2024-06-04 ASSESSMENT — PAIN SCALES - GENERAL: PAINLEVEL: MILD PAIN (3)

## 2024-06-04 NOTE — LETTER
6/4/2024      Ginger Marshall  [address]  [facility] [address]      Dear Colleague,    Thank you for referring your patient, Ginger Marshall, to the [facility] [place]. Please see a copy of my visit note below.    [facility]   Date of Visit: 6/4/2024  Original Consult: 02/16/2016- 6/1/2016 treated with Carmen Torres,     Consultation: Ginger Marshall is a 84 year old female who has a past medical history of Acute posthemorrhagic anemia (10/13/2012), Closed fracture of multiple ribs of left side, initial encounter (11/25/2019), Closed fracture of olecranon process of right ulna with routine healing (02/11/2023), Closed fracture of shaft of left ulna, unspecified fracture morphology, initial encounter (02/11/2023), COPD (chronic obstructive pulmonary disease) (H) (1980's), Depressive disorder, Ex-smoker (01/1999), Gastroenteritis (03/21/2020), Herniated nucleus pulposus, L3-4 (03/01/2017), Hip joint replacement by other means (07/10/2008), History of blood transfusion, 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 Ginger is being seen today at the request of Nico Byers for evaluation of her pain issues and recommendations for management.     Referral placed: 5/17/24  Nico Byers MD   [address] [facility] [address]   [place] [address]   Phone: [phone]   Fax: [phone]    Diagnoses: Chronic midline low back pain with left-sided sciatica   Order: Pain Management  Referral      Referring provider comments: non-opioid options for pain mangement    Relevant  "records/History:  I have reviewed available medical information in the patient's electronic medical record including relevant provider notes, laboratory work, and imaging.     ____________________________________________________________  History of Present Illness    Ginger reports:  - She has a long history of lower back pain and joint pain. Notes that she has been to many different pain clinics and had many different types of injections. She has difficulty recalling exact details of these visits, including the names of the clinics. In reviewing her chart with her, she was most recently seen at [facility] in [place].  - Her primary pain concern is her lower back and her lower legs. She has an intermittent sensation of numbness in her lower legs with a \"restless,\" twitching  type pain in her lower legs. This only occurs when she is lying down, then it gets better with walking.   - She reports that she is not a lot of pain right now. She has increased pain with cleaning her bathroom, standing up to cook more than 10 minutes. She can walk as long as she wants if she has a walker with her. Still walks her dog daily.   - Pain wakes her up at night and keeps her from falling asleep. Unsure if she needs a new mattress or if it is a pain issue.   - Most recently, she was started on Lyrica 25 mg BID, it was noted that she previously had good relief with Lyrica 50 mg TID. She took 25 mg BID for a month, then stopped as she was not going back to [facility]. She states that she is not interested in any  more injections as they do not seem to provide long term relief. She did feel that the last injection she had gave her relief of left leg pain.     Pain description:  Location: lower back and lower legs  Quality: burning, sharp, throbbing   Duration: intermittent, minimal pain at rest    Severity/Intensity (0 = No pain to 10 = Worst pain imaginable)   Now: 3, Average: 5, Best: 1, Worst: 5  Aggravating " factors include: standing, walking  Relieving factors include: exercise    Pain Intensity and Interference in the past week  Average Pain 5  Pain interference with your ENJOYMENT OF LIFE? 6  Pain interference with your GENERAL ACTIVITY? 6  PEG Total Score: 5.67    Current pain medications:  acetaminophen prn  Naproxen prn     Other relevant medications:  Sertraline  Rinvoq    PAIN MANAGEMENT TREATMENT HISTORY  1. MEDICATIONS:  Opioids: Hydrocodone, Percocet, Tramadol -SE, Migraines  NSAIDs: Celecoxib (allergy), Naproxen  Muscle relaxants: Cyclobenzaprine, Tizanidine - doesn't recall response  Pain Antidepressants/ Anxiolytics: Duloxetine   Sleep Aids: Ambien (helpful), Rozarem (unsure)   Neuroleptics: Gabapentin -not helpful, Pregabalin unsure if helpful  Topical: Lidoderm patch, Other gels, creams, patches or ointments   Adjuvant medications: Acetaminophen - somewhat helpful for headaches, Prednisone  2. PHYSICAL THERAPY:   Currently participating in PT with Ministerio Tran, due to complete in one more visit. Open to Pain PT afterwards, has not tried in the past.  3. PAIN PSYCHOLOGY:   Has not tried  4. SURGERY:   L4-5 hemilaminectomy  5. INJECTIONS:   [facility]   - BL L5-S1 TF OLIVIA 2/20/2024 - 2 weeks of relief  [facility]   -Left L4-5 TF OLIVIA 1/2022   - L4-5 TF OLIVIA 4/2021- no relief   - BL L4-S1 MBB 11/2021- no relief   - Bilateral SI joint injections 9/2021  [facility]  T10- T11 IL OLIVIA with Dr. Renteria 1/23/2020  Caudal OLIVIA with Dr. Hare 7/1/2019  B SI joint injections, right gluteal TPI with Dr. Hare 3/3/2016, 9/26/2016  L5-S1 IL OLIVIA with Dr. Cota  6. COMPLEMENTARY THERAPY:  Chiropractic: Has not tried  Acupuncture/acupressure: Tried, does not recall result  TENS unit: Tried, does not recall result  heat/cold applications: ice is helpful when she uses it.  7. PREVIOUS PAIN CLINIC:  Ginger has been seen at a pain clinic in the past. [facility], [facility], [facility], [facility], [facility]        Past Medical History  She has a past medical history of Acute posthemorrhagic anemia (10/13/2012), Closed fracture of multiple ribs of left side, initial encounter (2019), Closed fracture of olecranon process of right ulna with routine healing (2023), Closed fracture of shaft of left ulna, unspecified fracture morphology, initial encounter (2023), COPD (chronic obstructive pulmonary disease) (H) (), Depressive disorder, Ex-smoker (1999), Gastroenteritis (2020), Herniated nucleus pulposus, L3-4 (2017), Hip joint replacement by other means (07/10/2008), History of blood transfusion, History of total hip replacement (10/11/2012), History of total knee replacement (2009), Menopause (), Migraine (2014), Multinodular goiter, Other chronic pain, Other closed intra-articular fracture of distal end of left radius, initial encounter (2023), Pelvic fracture (H) (2014), Rheumatic mitral stenosis, Rheumatoid arteritis (H), S/P lumbar fusion (2017), Sleep apnea, and Vitamin B12 deficiency.   Past Surgical History  She has a past surgical history that includes  section (); orthopedic surgery (); orthopedic surgery (); arthroscopy knee rt/lt (2006); TOTAL KNEE ARTHROPLASTY (); HAND/FINGER SURGERY UNLISTED (2005); colonoscopy (); ANESTH,TOTAL HIP ARTHROPLASTY (); ESOPH/GAS REFLUX TEST W NASAL IMPED >1 HR (2012); IR Translaminar Epidural Lumbar Inj Incl Imaging (2012); orthopedic surgery (); Breast surgery (); back surgery (); cataract iol, rt/lt (Bilateral, ); Optical tracking system fusion spine posterior lumbar one level (N/A, 2017);  section (); Total Knee Arthroplasty (Left); Total Knee Arthroplasty (Right); Total Hip Arthroplasty (Right); Lumbar Fusion; Abdomen surgery; Arthroscopy knee (Left); Biopsy breast; Breast surgery; Hand surgery (Right); Foot surgery (Left); Hand  surgery (Right); Cataract Extraction (Bilateral); Colonoscopy; Ir Lumbar Epidural Steroid Injection; Finger surgery (Right, 2019); GYN surgery ([age]); Open reduction internal fixation wrist (Left, 2023); and Open reduction internal fixation elbow (Right, 2023).   Social History  She reports that she quit smoking about 25 years ago. Her smoking use included cigarettes. She started smoking about 66 years ago. She has a 41 pack-year smoking history. She has never used smokeless tobacco. She reports current alcohol use. She reports that she does not use drugs.  Social History     Social History Narrative    She lives in a a senior living apartment building.    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  Medications   has a current medication list which includes the following prescription(s): acetaminophen, albuterol, atorvastatin, buspirone, calcium citrate, carboxymethylcellulose pf, cholecalciferol, ferrous gluconate, fluticasone, hydrocortisone, ipratropium, leucovorin, loratadine, rasuvo, naproxen sodium, olopatadine, pantoprazole, pregabalin, [START ON 7/3/2024] pregabalin, sertraline, UNABLE TO FIND, rinvoq, valacyclovir, vitamin c, and zoledronic acid.  Allergies  Abatacept; Celebrex [tyler-2 inhibitors]; Celecoxib; Levaquin [levofloxacin]; Orencia [abatacept]; Septra [bactrim]; Sulfa antibiotics; Sulfamethoxazole-trimethoprim; Tramadol; Valdecoxib; Adhesive tape; and Antihistamines, chlorpheniramine-type  [antihistamines, chlorpheniramine-type]  Objective  /79   Pulse 85   SpO2 96%   Constitutional: Well developed, well nourished, appears stated age. No acute distress.  Gait is steady and uses walker  HEENT: Head atraumatic, normocephalic. Eyes without conjunctival injection or jaundice. Neck supple.   Skin: No obvious rash, lesions, or petechiae of exposed skin.   Extremities: Peripheral pulses  intact. No clubbing, cyanosis, or edema. Moves all extremities.  Psychiatric/mental status: Alert, without lethargy or stupor. Speech fluent. Appropriate affect. Mood normal. Able to follow commands without difficulty.   Musculoskeletal exam: Normal bulk and tone. Unremarkable spinal curvature. BLE strength and sensation grossly intact.  Significant Results and Procedures   Imaging:  Copied from [facility] note:      Copied from [facility] Note    Labs:  Creatinine   Date Value Ref Range Status   05/14/2024 0.55 0.51 - 0.95 mg/dL Final     AST   Date Value Ref Range Status   05/14/2024 29 0 - 45 U/L Final     Comment:     Reference intervals for this test were updated on 6/12/2023 to more accurately reflect our healthy population. There may be differences in the flagging of prior results with similar values performed with this method. Interpretation of those prior results can be made in the context of the updated reference intervals.     ALT   Date Value Ref Range Status   05/14/2024 23 0 - 50 U/L Final     Comment:     Reference intervals for this test were updated on 6/12/2023 to more accurately reflect our healthy population. There may be differences in the flagging of prior results with similar values performed with this method. Interpretation of those prior results can be made in the context of the updated reference intervals.           Assessment & Plan  Chronic midline low back pain with left-sided sciatica  DDD (degenerative disc disease), lumbar  Thoracic spine pain  Spinal stenosis of lumbar region without neurogenic claudication  Will have her restart Lyrica, with ramp up from 50 mg once a day to TID over a 15 day period. Refills sent in for next prescription. Also advised Pain PT after completing her current PT course. She is in agreement with this approach. She would prefer to follow-up with her PCP if Lyrica and PT are effective, I asked her to follow-up with me in 2-3 months if needed.   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     SHERRY (obstructive sleep apnea)-severe (AHI 35)  Established chronic issue that was considered when making medical decisions; no current exacerbations or new concerns.     Kayla Mathur, CNP-BC, PMGT-BC, AP-PMN  [facility]            Again, thank you for allowing me to participate in the care of your patient.        Sincerely,        Kayla Mathur, NP

## 2024-06-04 NOTE — PATIENT INSTRUCTIONS
Medications: Restart Pregabalin (Lyrica). Start with 50 mg at bedtime, then go to 50 mg twice a day, then 50 mg three times per day. After the first fill, you will be able to  a refill for three times per day - this will be on hold for when you need it.   Consider Pain PT, I placed an order for you.    What to expect from pain PT...  Focuses on chronic pain (greater than 6 months)  Focuses on managing chronic pain  Focuses on respect for the body, healing, injury prevention, and recovery  Internal focus  Mind-body consciousness  Calming, focusing, relaxing  Often slow and methodical   Builds coordination, balance, precision of movement  Broad focus on sensation and awareness of body space  Meditative, goal-less practice  3. Follow-up with me in 2-3 months, depending on how you are doing.       _____________________________________________________  Pain Clinic telephone number: [phone], After Hours Emergencies:  [phone]  Messages are returned Monday - Friday between 8 AM and 4:00 PM, typically within 48-72 hours.  Call the clinic or send a [employer] message with the medication name and your pharmacy 7 days in advance as it may take 3-4 days to process.  We do not refill medications outside of normal clinic hours.   If you are unable to keep your appointment please call at least 24 hours in advance to allow us to offer the appointment time to another patient. Multiple missed appointments will lead to dismissal from the clinic.

## 2024-06-04 NOTE — PROGRESS NOTES
[facility] Pain Management   Date of Visit: 6/4/2024  Original Consult: 02/16/2016- 6/1/2016 treated with Carmen Torres,     Consultation: Ginger Marshall is a 84 year old female who has a past medical history of Acute posthemorrhagic anemia (10/13/2012), Closed fracture of multiple ribs of left side, initial encounter (11/25/2019), Closed fracture of olecranon process of right ulna with routine healing (02/11/2023), Closed fracture of shaft of left ulna, unspecified fracture morphology, initial encounter (02/11/2023), COPD (chronic obstructive pulmonary disease) (H) (1980's), Depressive disorder, Ex-smoker (01/1999), Gastroenteritis (03/21/2020), Herniated nucleus pulposus, L3-4 (03/01/2017), Hip joint replacement by other means (07/10/2008), History of blood transfusion, 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 Ginger is being seen today at the request of Nico Byers for evaluation of her pain issues and recommendations for management.     Referral placed: 5/17/24  Nico Byers MD   [address] [address]   Phone: [phone]   Fax: [phone]    Diagnoses: Chronic midline low back pain with left-sided sciatica   Order: Pain Management  Referral      Referring provider comments: non-opioid options for pain mangement    Relevant records/History:  I have reviewed available medical information in the patient's electronic medical record including relevant provider notes, laboratory work, and imaging.     ____________________________________________________________  History of Present Illness  "    Ginger reports:  - She has a long history of lower back pain and joint pain. Notes that she has been to many different pain clinics and had many different types of injections. She has difficulty recalling exact details of these visits, including the names of the clinics. In reviewing her chart with her, she was most recently seen at [facility] in [place].  - Her primary pain concern is her lower back and her lower legs. She has an intermittent sensation of numbness in her lower legs with a \"restless,\" twitching  type pain in her lower legs. This only occurs when she is lying down, then it gets better with walking.   - She reports that she is not a lot of pain right now. She has increased pain with cleaning her bathroom, standing up to cook more than 10 minutes. She can walk as long as she wants if she has a walker with her. Still walks her dog daily.   - Pain wakes her up at night and keeps her from falling asleep. Unsure if she needs a new mattress or if it is a pain issue.   - Most recently, she was started on Lyrica 25 mg BID, it was noted that she previously had good relief with Lyrica 50 mg TID. She took 25 mg BID for a month, then stopped as she was not going back to [facility]. She states that she is not interested in any  more injections as they do not seem to provide long term relief. She did feel that the last injection she had gave her relief of left leg pain.     Pain description:  Location: lower back and lower legs  Quality: burning, sharp, throbbing   Duration: intermittent, minimal pain at rest    Severity/Intensity (0 = No pain to 10 = Worst pain imaginable)   Now: 3, Average: 5, Best: 1, Worst: 5  Aggravating factors include: standing, walking  Relieving factors include: exercise    Pain Intensity and Interference in the past week  Average Pain 5  Pain interference with your ENJOYMENT OF LIFE? 6  Pain interference with your GENERAL ACTIVITY? 6  PEG Total Score: " 5.67    Current pain medications:  acetaminophen prn  Naproxen prn     Other relevant medications:  Sertraline  Rinvoq    PAIN MANAGEMENT TREATMENT HISTORY  1. MEDICATIONS:  Opioids: Hydrocodone, Percocet, Tramadol -SE, Migraines  NSAIDs: Celecoxib (allergy), Naproxen  Muscle relaxants: Cyclobenzaprine, Tizanidine - doesn't recall response  Pain Antidepressants/ Anxiolytics: Duloxetine   Sleep Aids: Ambien (helpful), Rozarem (unsure)   Neuroleptics: Gabapentin -not helpful, Pregabalin unsure if helpful  Topical: Lidoderm patch, Other gels, creams, patches or ointments   Adjuvant medications: Acetaminophen - somewhat helpful for headaches, Prednisone  2. PHYSICAL THERAPY:   Currently participating in PT with Ministerio Tran, due to complete in one more visit. Open to Pain PT afterwards, has not tried in the past.  3. PAIN PSYCHOLOGY:   Has not tried  4. SURGERY:   L4-5 hemilaminectomy  5. INJECTIONS:   [facility]   - BL L5-S1 TF OLIVIA 2/20/2024 - 2 weeks of relief  [facility]   -Left L4-5 TF OLIVIA 1/2022   - L4-5 TF OLIVIA 4/2021- no relief   - BL L4-S1 MBB 11/2021- no relief   - Bilateral SI joint injections 9/2021  [facility]  T10- T11 IL OLIVIA with Dr. Renteria 1/23/2020  Caudal OLIVIA with Dr. Hare 7/1/2019  B SI joint injections, right gluteal TPI with Dr. Hare 3/3/2016, 9/26/2016  L5-S1 IL OLIVIA with Dr. Cota  6. COMPLEMENTARY THERAPY:  Chiropractic: Has not tried  Acupuncture/acupressure: Tried, does not recall result  TENS unit: Tried, does not recall result  heat/cold applications: ice is helpful when she uses it.  7. PREVIOUS PAIN CLINIC:  Ginger has been seen at a pain clinic in the past. [facility], [facility], [facility], [facility], [facility]       Past Medical History   She has a past medical history of Acute posthemorrhagic anemia (10/13/2012), Closed fracture of multiple ribs of left side, initial encounter (11/25/2019), Closed fracture of olecranon process of right ulna with routine healing  (2023), Closed fracture of shaft of left ulna, unspecified fracture morphology, initial encounter (2023), COPD (chronic obstructive pulmonary disease) (H) (), Depressive disorder, Ex-smoker (1999), Gastroenteritis (2020), Herniated nucleus pulposus, L3-4 (2017), Hip joint replacement by other means (07/10/2008), History of blood transfusion, History of total hip replacement (10/11/2012), History of total knee replacement (2009), Menopause (), Migraine (2014), Multinodular goiter, Other chronic pain, Other closed intra-articular fracture of distal end of left radius, initial encounter (2023), Pelvic fracture (H) (2014), Rheumatic mitral stenosis, Rheumatoid arteritis (H), S/P lumbar fusion (2017), Sleep apnea, and Vitamin B12 deficiency.   Past Surgical History   She has a past surgical history that includes  section (); orthopedic surgery (); orthopedic surgery (); arthroscopy knee rt/lt (2006); TOTAL KNEE ARTHROPLASTY (); HAND/FINGER SURGERY UNLISTED (2005); colonoscopy (); ANESTH,TOTAL HIP ARTHROPLASTY (); ESOPH/GAS REFLUX TEST W NASAL IMPED >1 HR (2012); IR Translaminar Epidural Lumbar Inj Incl Imaging (2012); orthopedic surgery (); Breast surgery (); back surgery (); cataract iol, rt/lt (Bilateral, ); Optical tracking system fusion spine posterior lumbar one level (N/A, 2017);  section (); Total Knee Arthroplasty (Left); Total Knee Arthroplasty (Right); Total Hip Arthroplasty (Right); Lumbar Fusion; Abdomen surgery; Arthroscopy knee (Left); Biopsy breast; Breast surgery; Hand surgery (Right); Foot surgery (Left); Hand surgery (Right); Cataract Extraction (Bilateral); Colonoscopy; Ir Lumbar Epidural Steroid Injection; Finger surgery (Right, 2019); GYN surgery (66,74); Open reduction internal fixation wrist (Left, 2023); and Open reduction internal  fixation elbow (Right, 2023).   Social History   She reports that she quit smoking about 25 years ago. Her smoking use included cigarettes. She started smoking about 66 years ago. She has a 41 pack-year smoking history. She has never used smokeless tobacco. She reports current alcohol use. She reports that she does not use drugs.  Social History     Social History Narrative    She lives in a a senior living apartment building.    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  Medications    has a current medication list which includes the following prescription(s): acetaminophen, albuterol, atorvastatin, buspirone, calcium citrate, carboxymethylcellulose pf, cholecalciferol, ferrous gluconate, fluticasone, hydrocortisone, ipratropium, leucovorin, loratadine, rasuvo, naproxen sodium, olopatadine, pantoprazole, pregabalin, [START ON 7/3/2024] pregabalin, sertraline, UNABLE TO FIND, rinvoq, valacyclovir, vitamin c, and zoledronic acid.  Allergies   Abatacept; Celebrex [tyler-2 inhibitors]; Celecoxib; Levaquin [levofloxacin]; Orencia [abatacept]; Septra [bactrim]; Sulfa antibiotics; Sulfamethoxazole-trimethoprim; Tramadol; Valdecoxib; Adhesive tape; and Antihistamines, chlorpheniramine-type  [antihistamines, chlorpheniramine-type]  Objective   /79   Pulse 85   SpO2 96%   Constitutional: Well developed, well nourished, appears stated age. No acute distress.  Gait is steady and uses walker  HEENT: Head atraumatic, normocephalic. Eyes without conjunctival injection or jaundice. Neck supple.   Skin: No obvious rash, lesions, or petechiae of exposed skin.   Extremities: Peripheral pulses intact. No clubbing, cyanosis, or edema. Moves all extremities.  Psychiatric/mental status: Alert, without lethargy or stupor. Speech fluent. Appropriate affect. Mood normal. Able to follow commands without difficulty.   Musculoskeletal  exam: Normal bulk and tone. Unremarkable spinal curvature. BLE strength and sensation grossly intact.  Significant Results and Procedures    Imaging:  Copied from [facility] note:      Copied from [facility] Note    Labs:  Creatinine   Date Value Ref Range Status   05/14/2024 0.55 0.51 - 0.95 mg/dL Final     AST   Date Value Ref Range Status   05/14/2024 29 0 - 45 U/L Final     Comment:     Reference intervals for this test were updated on 6/12/2023 to more accurately reflect our healthy population. There may be differences in the flagging of prior results with similar values performed with this method. Interpretation of those prior results can be made in the context of the updated reference intervals.     ALT   Date Value Ref Range Status   05/14/2024 23 0 - 50 U/L Final     Comment:     Reference intervals for this test were updated on 6/12/2023 to more accurately reflect our healthy population. There may be differences in the flagging of prior results with similar values performed with this method. Interpretation of those prior results can be made in the context of the updated reference intervals.           Assessment & Plan   Chronic midline low back pain with left-sided sciatica  DDD (degenerative disc disease), lumbar  Thoracic spine pain  Spinal stenosis of lumbar region without neurogenic claudication  Will have her restart Lyrica, with ramp up from 50 mg once a day to TID over a 15 day period. Refills sent in for next prescription. Also advised Pain PT after completing her current PT course. She is in agreement with this approach. She would prefer to follow-up with her PCP if Lyrica and PT are effective, I asked her to follow-up with me in 2-3 months if needed.   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     SHERRY (obstructive sleep apnea)-severe (AHI 35)  Established chronic issue that was considered when making medical decisions; no current exacerbations or new concerns.     Kayla Mathur, CNP-BC, PMGT-BC, AP-PMN  [facility]

## 2024-06-05 ENCOUNTER — THERAPY VISIT (OUTPATIENT)
Dept: PHYSICAL THERAPY | Facility: CLINIC | Age: 84
End: 2024-06-05
Payer: COMMERCIAL

## 2024-06-05 DIAGNOSIS — M54.50 CHRONIC BILATERAL LOW BACK PAIN WITHOUT SCIATICA: Primary | Chronic | ICD-10-CM

## 2024-06-05 DIAGNOSIS — G89.29 CHRONIC BILATERAL LOW BACK PAIN WITHOUT SCIATICA: Primary | Chronic | ICD-10-CM

## 2024-06-05 PROCEDURE — 97530 THERAPEUTIC ACTIVITIES: CPT | Mod: GP | Performed by: PHYSICAL THERAPIST

## 2024-06-05 PROCEDURE — 97110 THERAPEUTIC EXERCISES: CPT | Mod: GP | Performed by: PHYSICAL THERAPIST

## 2024-06-05 PROCEDURE — 97112 NEUROMUSCULAR REEDUCATION: CPT | Mod: GP | Performed by: PHYSICAL THERAPIST

## 2024-06-05 NOTE — PROGRESS NOTES
[facility]                                                                                   OUTPATIENT PHYSICAL THERAPY    PLAN OF TREATMENT FOR OUTPATIENT REHABILITATION   Patient's Last Name, First Name, Ginger Vu YOB: 1940   Provider's Name   [facility]   Medical Record No.  [id number]     Onset Date: 10/08/23  Start of Care Date: 04/08/24     Medical Diagnosis:  LBP      PT Treatment Diagnosis:  LBP Plan of Treatment  Frequency/Duration: 1 x a week/ 4 weeks    Certification date from 06/04/24 to 07/02/24         See note for plan of treatment details and functional goals     Ministerio Tran PT                         I CERTIFY THE NEED FOR THESE SERVICES FURNISHED UNDER        THIS PLAN OF TREATMENT AND WHILE UNDER MY CARE     (Physician attestation of this document indicates review and certification of the therapy plan).              Referring Provider:  Nico Byers    Initial Assessment  See Epic Evaluation- Start of Care Date: 04/08/24 06/05/24 0500   Appointment Info   Signing clinician's name / credentials Ministerio Tran DPT   Total/Authorized Visits 8   Visits Used 4   Medical Diagnosis LBP   PT Tx Diagnosis LBP   Quick Adds Certification   Progress Note/Certification   Start of Care Date 04/08/24   Onset of illness/injury or Date of Surgery 10/08/23   Therapy Frequency 1 x a week   Predicted Duration 4 weeks   Certification date from 06/04/24   Certification date to 07/02/24   Progress Note Completed Date 04/15/24       Present No   GOALS   PT Goals 2   PT Goal 1   Goal Description Pt will be able to tolerate standing for 30 minutes   Rationale to maximize safety and independence with performance of ADLs and functional tasks;to maximize safety and independence within the home;to maximize safety and independence within the community   Goal Progress making progress   Target Date  07/02/24   Subjective Report   Subjective Report States this week she has had a little more pain. States every day she has had things she has had to do and that may be why she is having some more pain. Notices it moslty with walking. Also noticing more hip pain.   Objective Measures   Objective Measures Objective Measure 1;Objective Measure 2   Objective Measure 1   Objective Measure fair lower abdominal activation   Details LROM: flexion 75% dec pain, Ext 25% pain increased   Treatment Interventions (PT)   Interventions Therapeutic Procedure/Exercise;Therapeutic Activity;Neuromuscular Re-education;Manual Therapy   Therapeutic Procedure/Exercise   Therapeutic Procedures: strength, endurance, ROM, flexibility minutes (20675) 18   Therapeutic Procedures Ther Proc 2;Ther Proc 3;Ther Proc 4;Ther Proc 5   Ther Proc 1 Education   Ther Proc 1 - Details educated regarding role of therapeutic exercise, POC, expected goal in therapy, nature of condition   Ther Proc 2 Pulldowns   Ther Proc 2 - Details RTB 2 x 15 cued standing tall   Ther Proc 3 Standing marching   Ther Proc 3 - Details abdomianls activation x 10   PTRx Ther Proc 1 Sitting Flexion   PTRx Ther Proc 1 - Details 3-5 reps hold 10 seconds 3 sets decreased pain-discussed tightening abdominals with bending and sitting back up   PTRx Ther Proc 2 Step Standing Flexion   PTRx Ther Proc 2 - Details 10 sec holds   PTRx Ther Proc 3 Supine Lumbar Hip Roll   PTRx Ther Proc 3 - Details HEP   Skilled Intervention VC's and MC's   Patient Response/Progress tolerated well and pain decreased   Ther Proc 4 Standing Hip Extension   Ther Proc 4 - Details x10 B   Ther Proc 5 Standing Hip Abduction   Ther Proc 5 - Details x10 B   Therapeutic Activity   Therapeutic Activities: dynamic activities to improve functional performance minutes (72197) 12   Therapeutic Activities Ther Act 2;Ther Act 3   Ther Act 1 sit to stand without use of hands from chair-very difficult   Ther Act 2 sit to  "stand from raised plinth without hands   Ther Act 2 - Details x10   Skilled Intervention VC   Patient Response/Progress not too bad but fatigued   Ther Act 3 Knee Bends at the Sink   Ther Act 3 - Details x10- not HEP   Neuromuscular Re-education   Neuromuscular re-ed of mvmt, balance, coord, kinesthetic sense, posture, proprioception minutes (59699) 10   Neuromuscular Re-education Neuro Re-ed 2   Neuro Re-ed 1 Posture education, sitting, standing, lifting   Neuro Re-ed 2 TA activation in sitting   Neuro Re-ed 2 - Details 5\" holds working on not holding her breath   PTRx Neuro Re-ed 1 Rhomberg baalance   PTRx Neuro Re-ed 1 - Details EC 30\" holds   PTRx Neuro Re-ed 2 midified tandem stance   PTRx Neuro Re-ed 2 - Details 4 x 30\"   Skilled Intervention MC and VC and importance of balance   Patient Response/Progress tolerated well and dec pain   Education   Learner/Method Patient;Listening;Demonstration;Pictures/Video   Education Comments Pt educated on plan of care   Plan   Plan for next session continue to progress strength and ROM   Total Session Time   Timed Code Treatment Minutes 40   Total Treatment Time (sum of timed and untimed services) 40     "

## 2024-06-12 ENCOUNTER — THERAPY VISIT (OUTPATIENT)
Dept: PHYSICAL THERAPY | Facility: CLINIC | Age: 84
End: 2024-06-12
Payer: COMMERCIAL

## 2024-06-12 DIAGNOSIS — M54.50 CHRONIC BILATERAL LOW BACK PAIN WITHOUT SCIATICA: Primary | Chronic | ICD-10-CM

## 2024-06-12 DIAGNOSIS — G89.29 CHRONIC BILATERAL LOW BACK PAIN WITHOUT SCIATICA: Primary | Chronic | ICD-10-CM

## 2024-06-12 PROCEDURE — 97110 THERAPEUTIC EXERCISES: CPT | Mod: GP | Performed by: PHYSICAL THERAPIST

## 2024-06-12 PROCEDURE — 97112 NEUROMUSCULAR REEDUCATION: CPT | Mod: GP | Performed by: PHYSICAL THERAPIST

## 2024-06-12 PROCEDURE — 97530 THERAPEUTIC ACTIVITIES: CPT | Mod: GP | Performed by: PHYSICAL THERAPIST

## 2024-06-12 RX ORDER — TRIAMCINOLONE ACETONIDE 1 MG/G
OINTMENT TOPICAL 2 TIMES DAILY
COMMUNITY
End: 2024-10-03

## 2024-06-12 NOTE — PROGRESS NOTES
DISCHARGE  Reason for Discharge: Patient feels she is making good progress and will continue with her HEP and will DC from PT    Equipment Issued:     Discharge Plan: Patient to continue home program.    Referring Provider:  Nico Byers

## 2024-06-12 NOTE — TELEPHONE ENCOUNTER
This medication was prescribed this medication over a year ago and she's requesting a refill.    [facility]  Phone: [phone]

## 2024-06-13 RX ORDER — TRIAMCINOLONE ACETONIDE 1 MG/G
OINTMENT TOPICAL 2 TIMES DAILY
OUTPATIENT
Start: 2024-06-13

## 2024-06-19 ENCOUNTER — TELEPHONE (OUTPATIENT)
Dept: FAMILY MEDICINE | Facility: CLINIC | Age: 84
End: 2024-06-19

## 2024-06-19 NOTE — TELEPHONE ENCOUNTER
Forms/Letter Request    Type of form/letter: DME (wheelchair, hospital bed)    Type of DME requested: Medical Supply     Do we have the form/letter: Yes: Seat lift Mechanism    Who is the form from? [employer]     Where did/will the form come from? form was faxed in    When is form/letter needed by: asap    How would you like the form/letter returned: Fax : [phone]    Patient Notified form requests are processed in 5-7 business days:Yes    Could we send this information to you in [email or web address] or would you prefer to receive a phone call?:   No preference   Okay to leave a detailed message?: Yes at Other phone number:  [phone]    **Placed in the provider's basket for review.**

## 2024-06-20 NOTE — TELEPHONE ENCOUNTER
Brief chart review.    DME order for chair lift was sent from last face to face on 4/2024. Since that time, she has been working with PT for LBP.     Received paperwork to complete DME order including the following questions:   - Is the patient completely incapable of standing up from a regular armchair or any chair in his/her home?     - Have all appropriate therapeutic modalities to enable the patient to transfer from a chair to a standing position been tried and failed? If yes, this is documented in patient's medical records.     Looks patient is currently in physical therapy with Ministerio Tran. Will send message regarding these questions for more accurate assessment of need for chair lift.     Valentin Mandel, in your professional opinion, do you think that Ginger is in need of lift chair? I understand that it may be too early to tell if she hasn't completed the full course of physical therapy.     Please route back to me when completed    Duy Leyva MD  [facility]  6/20/2024

## 2024-06-25 ENCOUNTER — ANCILLARY PROCEDURE (OUTPATIENT)
Dept: GENERAL RADIOLOGY | Facility: CLINIC | Age: 84
End: 2024-06-25
Attending: STUDENT IN AN ORGANIZED HEALTH CARE EDUCATION/TRAINING PROGRAM
Payer: COMMERCIAL

## 2024-06-25 ENCOUNTER — OFFICE VISIT (OUTPATIENT)
Dept: FAMILY MEDICINE | Facility: CLINIC | Age: 84
End: 2024-06-25
Payer: COMMERCIAL

## 2024-06-25 VITALS
WEIGHT: 210 LBS | BODY MASS INDEX: 34.99 KG/M2 | RESPIRATION RATE: 21 BRPM | DIASTOLIC BLOOD PRESSURE: 57 MMHG | OXYGEN SATURATION: 95 % | SYSTOLIC BLOOD PRESSURE: 118 MMHG | TEMPERATURE: 97.8 F | HEIGHT: 65 IN | HEART RATE: 90 BPM

## 2024-06-25 DIAGNOSIS — R09.89 CHOKING SENSATION: Primary | ICD-10-CM

## 2024-06-25 DIAGNOSIS — M79.89 PAIN AND SWELLING OF FOREARM, RIGHT: ICD-10-CM

## 2024-06-25 DIAGNOSIS — M79.631 PAIN AND SWELLING OF FOREARM, RIGHT: ICD-10-CM

## 2024-06-25 DIAGNOSIS — D50.8 IRON DEFICIENCY ANEMIA REFRACTORY TO IRON THERAPY: ICD-10-CM

## 2024-06-25 DIAGNOSIS — R15.9 FULL INCONTINENCE OF FECES: ICD-10-CM

## 2024-06-25 DIAGNOSIS — M25.552 HIP PAIN, LEFT: ICD-10-CM

## 2024-06-25 DIAGNOSIS — E53.8 VITAMIN B12 DEFICIENCY (NON ANEMIC): ICD-10-CM

## 2024-06-25 PROBLEM — M51.369 DDD (DEGENERATIVE DISC DISEASE), LUMBAR: Chronic | Status: ACTIVE | Noted: 2019-07-16

## 2024-06-25 LAB
ERYTHROCYTE [DISTWIDTH] IN BLOOD BY AUTOMATED COUNT: 14.1 % (ref 10–15)
FERRITIN SERPL-MCNC: 86 NG/ML (ref 11–328)
FOLATE SERPL-MCNC: 14.4 NG/ML (ref 4.6–34.8)
HCT VFR BLD AUTO: 35.4 % (ref 35–47)
HGB BLD-MCNC: 11.8 G/DL (ref 11.7–15.7)
MCH RBC QN AUTO: 34.5 PG (ref 26.5–33)
MCHC RBC AUTO-ENTMCNC: 33.3 G/DL (ref 31.5–36.5)
MCV RBC AUTO: 104 FL (ref 78–100)
PLATELET # BLD AUTO: 317 10E3/UL (ref 150–450)
RBC # BLD AUTO: 3.42 10E6/UL (ref 3.8–5.2)
VIT B12 SERPL-MCNC: 441 PG/ML (ref 232–1245)
WBC # BLD AUTO: 6.9 10E3/UL (ref 4–11)

## 2024-06-25 PROCEDURE — 85027 COMPLETE CBC AUTOMATED: CPT | Performed by: STUDENT IN AN ORGANIZED HEALTH CARE EDUCATION/TRAINING PROGRAM

## 2024-06-25 PROCEDURE — 73090 X-RAY EXAM OF FOREARM: CPT | Mod: TC | Performed by: RADIOLOGY

## 2024-06-25 PROCEDURE — 82607 VITAMIN B-12: CPT | Performed by: STUDENT IN AN ORGANIZED HEALTH CARE EDUCATION/TRAINING PROGRAM

## 2024-06-25 PROCEDURE — 99214 OFFICE O/P EST MOD 30 MIN: CPT | Performed by: STUDENT IN AN ORGANIZED HEALTH CARE EDUCATION/TRAINING PROGRAM

## 2024-06-25 PROCEDURE — 36415 COLL VENOUS BLD VENIPUNCTURE: CPT | Performed by: STUDENT IN AN ORGANIZED HEALTH CARE EDUCATION/TRAINING PROGRAM

## 2024-06-25 PROCEDURE — 82728 ASSAY OF FERRITIN: CPT | Performed by: STUDENT IN AN ORGANIZED HEALTH CARE EDUCATION/TRAINING PROGRAM

## 2024-06-25 PROCEDURE — 82746 ASSAY OF FOLIC ACID SERUM: CPT | Performed by: STUDENT IN AN ORGANIZED HEALTH CARE EDUCATION/TRAINING PROGRAM

## 2024-06-25 RX ORDER — RESPIRATORY SYNCYTIAL VIRUS VACCINE 120MCG/0.5
0.5 KIT INTRAMUSCULAR ONCE
Qty: 1 EACH | Refills: 0 | Status: CANCELLED | OUTPATIENT
Start: 2024-06-25 | End: 2024-06-25

## 2024-06-25 ASSESSMENT — PATIENT HEALTH QUESTIONNAIRE - PHQ9
10. IF YOU CHECKED OFF ANY PROBLEMS, HOW DIFFICULT HAVE THESE PROBLEMS MADE IT FOR YOU TO DO YOUR WORK, TAKE CARE OF THINGS AT HOME, OR GET ALONG WITH OTHER PEOPLE: SOMEWHAT DIFFICULT
SUM OF ALL RESPONSES TO PHQ QUESTIONS 1-9: 9
SUM OF ALL RESPONSES TO PHQ QUESTIONS 1-9: 9

## 2024-06-25 ASSESSMENT — PAIN SCALES - GENERAL: PAINLEVEL: MODERATE PAIN (5)

## 2024-06-25 NOTE — PROGRESS NOTES
"  Assessment & Plan     Choking sensation  Complete inability to breath for up to one minute which seems to only be induced by drinking fluids. Considering vocal cord dysfunction, laryngospasm. Plan to refer to ENT for visualization of larynx. Already on daily PPI. May need to consider referral to SLP for swallow study.   - Adult ENT  Referral    Pain and swelling of forearm, right  No recent injury but abrupt swelling, warmth, tenderness over the past 3 days overlying prior surgical ousmane placement. CBC WNL and no fevers. XR unremarkable other than soft tissue swelling. Does not appear consistent with abscess as it is not currently fluctuant. Most likely mild trauma-induced contusion. Discussed ice, NSAIDs with close monitoring for symptom worsening. Consider referral back to Ortho if persistent.  - XR Forearm Right 2 Views    Full incontinence of feces  Endorsing occasional full fecal incontinence only overnight. Worsening over the past 5-6 years and now occurring 1-2x per year. No other symptoms/signs associated with this. Normal stools during the daytime. Discussed adding daily metamucil for bulking. Could also consider pelvic floor therapy. Follow up if not improving OR symptoms worsening.     Hip pain, left  Did not have time to assess today. Referring to Orthopaedics for ongoing management  - Orthopedic  Referral    Vitamin B12 deficiency (non anemic)  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  Prior lab orders obtained today as below.  - Ferritin  - CBC with platelets  - Vitamin B12  - Folate    40 minutes spent on precharting, visit and documentation    BMI  Estimated body mass index is 34.95 kg/m  as calculated from the following:    Height as of this encounter: 1.651 m (5' 5\").    Weight as of this encounter: 95.3 kg (210 lb).     Follow up in one month, early as needed    Duy Leyva MD  [facility]  6/25/2024      Alejandro Miles is a 84 year old, presenting " for the following health issues:  Derm Problem (Itchy back)        6/25/2024     8:44 AM   Additional Questions   Roomed by Carolyn CUNNINGHAM     Pt states she will start choking out of no where. Usually when eating or drinking and pt states her throat will close up completely and won't be able to breath. Usually lasts until she can catch her breath. Has happened 3 times since last weekend.    R arm is swollen and has been swollen for almost 3 days. There is a ousmane in that arm.    L hip pain that is worse when walking.    Has been getting diarrhea in the middle of the night and pt is unsure what is causing it. Happens at least once a month.    R shoulder pain has been going on a couple of days.    **PT IS AWARE THERE MAY NOT BE TIME FOR ALL ITEMS**    History of Present Illness       Reason for visit:  New medication for itchy back and chocking problem.  Symptom onset:  More than a month  Symptoms include:  Itching and chocker  Symptom intensity:  Moderate  Symptom progression:  Staying the same  Had these symptoms before:  Yes  Has tried/received treatment for these symptoms:  Yes  Previous treatment was successful:  No  What makes it worse:  Don't know  What makes it better:  Don't know    She eats 2-3 servings of fruits and vegetables daily.She consumes 0 sweetened beverage(s) daily.She exercises with enough effort to increase her heart rate 9 or less minutes per day.  She exercises with enough effort to increase her heart rate 3 or less days per week.   She is taking medications regularly.     Choking sensation  Has had happened through the years but only periodically.  Since the weekend, has occurred 3 times now.  Only occurred with drinking fluids.  Doesn't feel like she is actually choking or like water in the lungs but all of a sudden her throat closes and she cannot breath in mouth or even through nose.   Does not occur with solids.  Ongoing for at least a minute. Then just all of sudden will loosens up.   Does  "have lingering throat pain directly following the incident but not at baseline.   Closure seems to be in the throat region. Not lower at all.   She feels it is related to the constant post-nasal drip. Is taking mucinex a lot.   Is clearing throat constantly. No bulging or mass sensation in throat.   Notes some hoarseness but is able to clear throat and resolve this typically.  Never has had evaluation in the past.   Denies any SOB or SIERRA.     Stool incontinence  Notes stool incontinence overnight in which she has to change bedsheets.  Described as liquid stool. This occurs intermittently over the past 5-6 years but overall seems to be getting worse.   Since being in [place], occurred maybe 4-5x in the past 3 years.   Her day time stools are not normally liquid. She does endorse looser stools - snake like but not typically diarrhea.  No fevers, weight loss. No abdominal pains. No blood in stools.   No nausea or vomiting.   Only change in the last 10-20 years is that she now has stool incontinence.   Not on any stool softeners or anything else.     Is taking a lot of ibuprofen lately.    Elbow swelling  Right arm elbow swelling  Just noticed this three days ago  No known injury.   Seems to be bigger today than yesterday.  Not painful unless touching it.   Is warm and tender.   No fevers, see above.        Objective    /57 (BP Location: Right arm, Patient Position: Sitting, Cuff Size: Adult Large)   Pulse 90   Temp 97.8  F (36.6  C) (Oral)   Resp 21   Ht 1.651 m (5' 5\")   Wt 95.3 kg (210 lb)   SpO2 95%   BMI 34.95 kg/m    Body mass index is 34.95 kg/m .    Physical Exam   GENERAL: healthy, alert and no distress  HEAD: Normocephalic, atraumatic.   EYES: PERRL. Normal conjunctivae, sclera.   ENT: Normal EAC and TMs bilaterally. Normal oropharynx.   NECK: Supple. No lymphadenopathy appreciated. No stridor appreciated. Trachea midline. Thyroid not enlarged, not TTP.  RESP: lungs clear to auscultation - no " rales, rhonchi or wheezes  CV: regular rate and rhythm, normal S1 S2, no murmur, click, rub or gallop.    ABDOMEN: soft, no TTP x4 quadrants. No hepatomegaly or masses appreciated. BS normactive.  MSK: No obvious abnormalities  SKIN: Right distal posterior forearm with firm, 3x4cm localized subcutaneous edema, warmth and TTP. No obvious fluctuance.   EXT: Warm and well perfused. DP pulses 2+ bilaterally.  NEURO: CNII-XII grossly intact. No focal deficits.  PSYCH: Groomed, dressed appropriately for weather. Normal mood with consistent affect.     Signed Electronically by: Duy Leyva MD

## 2024-07-01 ENCOUNTER — ANCILLARY PROCEDURE (OUTPATIENT)
Dept: GENERAL RADIOLOGY | Facility: CLINIC | Age: 84
End: 2024-07-01
Attending: PHYSICIAN ASSISTANT
Payer: COMMERCIAL

## 2024-07-01 ENCOUNTER — THERAPY VISIT (OUTPATIENT)
Dept: PHYSICAL THERAPY | Facility: CLINIC | Age: 84
End: 2024-07-01
Attending: NURSE PRACTITIONER
Payer: COMMERCIAL

## 2024-07-01 ENCOUNTER — OFFICE VISIT (OUTPATIENT)
Dept: ORTHOPEDICS | Facility: CLINIC | Age: 84
End: 2024-07-01
Payer: COMMERCIAL

## 2024-07-01 VITALS
DIASTOLIC BLOOD PRESSURE: 76 MMHG | SYSTOLIC BLOOD PRESSURE: 152 MMHG | BODY MASS INDEX: 34.99 KG/M2 | WEIGHT: 210 LBS | HEIGHT: 65 IN

## 2024-07-01 DIAGNOSIS — M16.12 PRIMARY OSTEOARTHRITIS OF LEFT HIP: ICD-10-CM

## 2024-07-01 DIAGNOSIS — M54.42 CHRONIC MIDLINE LOW BACK PAIN WITH LEFT-SIDED SCIATICA: ICD-10-CM

## 2024-07-01 DIAGNOSIS — G89.29 CHRONIC MIDLINE LOW BACK PAIN WITH LEFT-SIDED SCIATICA: ICD-10-CM

## 2024-07-01 DIAGNOSIS — M16.12 PRIMARY OSTEOARTHRITIS OF LEFT HIP: Primary | ICD-10-CM

## 2024-07-01 PROCEDURE — 73501 X-RAY EXAM HIP UNI 1 VIEW: CPT | Mod: TC | Performed by: RADIOLOGY

## 2024-07-01 PROCEDURE — 97161 PT EVAL LOW COMPLEX 20 MIN: CPT | Mod: GP | Performed by: PHYSICAL THERAPIST

## 2024-07-01 PROCEDURE — 99203 OFFICE O/P NEW LOW 30 MIN: CPT | Performed by: PHYSICIAN ASSISTANT

## 2024-07-01 PROCEDURE — 97112 NEUROMUSCULAR REEDUCATION: CPT | Mod: GP | Performed by: PHYSICAL THERAPIST

## 2024-07-01 NOTE — PROGRESS NOTES
PHYSICAL THERAPY EVALUATION  Type of Visit: Evaluation     Fall Risk Screen:  Fall screen completed by: PT  Have you fallen 2 or more times in the past year?: Yes  Have you fallen and had an injury in the past year?: No  Is patient a fall risk?: Yes    Subjective      Patient reporting pain form the waist down, mostly left low back feels a catching sensation,  at night has burning hurting and throbbing.  Currently, has left sided low back and going down her leg Also, has left hip pain, and questions if this is arthritis and needs a hip replacement.  Pt would like her hip assessed. Pt has had PT, however not helpful with her ongoing pain.   Presenting condition or subjective complaint: To learn how to control chronic pain  Date of onset: 06/04/24 (date of referral)    Relevant medical history: Arthritis; Osteoporosis; Overweight; Pain at night or rest; Rheumatoid arthritis; Sleep disorder like apnea   Dates & types of surgery: Both feet,  Both knees,  right hip,  right hand    Prior diagnostic imaging/testing results: MRI; X-ray     Prior therapy history for the same diagnosis, illness or injury: Yes Injection and p.t.    Prior Level of Function  Transfers: Independent  Ambulation: Independent-  currently uses her FWW and or 4WW  ADL: Independent  IADL:  Independent-   currently has assist    Living Environment  Social support: Alone   Type of home: Apartment/condo   Stairs to enter the home: No       Ramp: No   Stairs inside the home: No       Help at home: Self Cares (home health aide/personal care attendant, family, etc); Emergency call system  Equipment owned: Four-point cane; Walker; Walker with wheels; Grab bars; Raised toilet seat       Patient goals for therapy: Stand up straight,  walk without pain       Objective   ADDITIONAL HISTORY:  Pain: pt has pain in left low back, down her legs,   takes ibuprofen, and tylenol, helps somewhat      LEVEL OF FUNCTION AT START OF CARE  Walking tolerance: pt tolerates  walking with her 4WW, she walks her dog, tolerates about 15 minutes,  fatigue and pain is what limits  Sitting tolerance: does not increase back pain  Standing tolerance: to struggles to stand,  she bends over to feel better, only 5 minutes  Housework tolerance: does not tolerate, has help with this  Sleep:  poor ,  pt struggles to fall asleep, wakes up with leg pain,  leg pain is what keeps her up at night    Current Aggravating Activities / Functional Limitations: standing and walking    Relieving Activities / Self Care: Ice, OTC Medication(s): tylenol, started lyrics unsure if helpingi, and rest and sitting      POSTURE: Standing Posture: Rounded shoulders, Forward head, Lordosis increased, Thoracic kyphosis increased  Sitting Posture: Rounded shoulders, Forward head, Thoracic kyphosis increased      GAIT:   Weightbearing Status: WBAT  Assistive Device(s): Walker (front wheeled)  Gait Deviations: Base of support increased    Balance/Proprioception:  poor- needs UE support    RANGE OF MOTION:   Cervical: WFL  Lumbar: flex- mod limit, ext- mod limit, SG right and left mod limi  Shoulders: WFL  Hips: decreased motion and painful in left hip-  left hip positive for left hip pathology    MUSCLE PERFORMANCE:   Strength: demonstrates core instability; poor overall strength      FLEXIBILITY: general stiffness throughout    FUNCTIONAL TESTS: sit to stand increased effort, pain and stiffness in hip    SPECIAL TEST(S): neg slump and SLR          Assessment & Plan   CLINICAL IMPRESSIONS  Medical Diagnosis: chronic low back pain left sciatica    Treatment Diagnosis: chronic low back pain left sciatica   Impression/Assessment: Patient is a 84 year old female with Chronic low back and hip pain complaints.  The following significant findings have been identified: Pain, Decreased ROM/flexibility, Decreased joint mobility, Decreased strength, Impaired balance, Impaired muscle performance, Decreased activity tolerance, and Impaired  posture. These impairments interfere with their ability to perform self care tasks, recreational activities, household chores, driving , household mobility, and community mobility as compared to previous level of function.     Clinical Decision Making (Complexity):  Clinical Presentation: Stable/Uncomplicated  Clinical Presentation Rationale: based on medical and personal factors listed in PT evaluation  Clinical Decision Making (Complexity): Low complexity    PLAN OF CARE  Treatment Interventions:  Modalities: E-stim  Interventions: Gait Training, Manual Therapy, Neuromuscular Re-education, Therapeutic Activity, Therapeutic Exercise, Self-Care/Home Management    Long Term Goals     PT Goal 1  Goal Identifier: walking  Goal Description: Patient will be able to walk 25+ minutes with minimal increase in pain and fatigue  Rationale: to maximize safety and independence with performance of ADLs and functional tasks;to maximize safety and independence within the home;to maximize safety and independence within the community;to maximize safety and independence with transportation;to maximize safety and independence with self cares  Target Date: 09/27/24      Frequency of Treatment: 1x/week 4 weeks, 2x/mo, 2 months  Duration of Treatment: 9/27/24    Recommended Referrals to Other Professionals:   Education Assessment:   Learner/Method: No Barriers to Learning  Education Comments: no concerns    Risks and benefits of evaluation/treatment have been explained.   Patient/Family/caregiver agrees with Plan of Care.     Evaluation Time:     PT Eval, Low Complexity Minutes (51572): 30       Signing Clinician: Margaux Strong PT        [facility]                                                                                   OUTPATIENT PHYSICAL THERAPY      PLAN OF TREATMENT FOR OUTPATIENT REHABILITATION   Patient's Last Name, First Name, Ginger Vu YOB: 1940   Provider's  Name   [facility]   Medical Record No.  [id number]     Onset Date: 06/04/24 (date of referral)  Start of Care Date: 07/01/24     Medical Diagnosis:  chronic low back pain left sciatica      PT Treatment Diagnosis:  chronic low back pain left sciatica Plan of Treatment  Frequency/Duration: 1x/week 4 weeks, 2x/mo, 2 months/ 9/27/24    Certification date from 07/01/24 to 09/27/24         See note for plan of treatment details and functional goals     Margaux Strong, PT                         I CERTIFY THE NEED FOR THESE SERVICES FURNISHED UNDER        THIS PLAN OF TREATMENT AND WHILE UNDER MY CARE     (Physician attestation of this document indicates review and certification of the therapy plan).              Referring Provider:  Kayla Mathur    Initial Assessment  See Epic Evaluation- Start of Care Date: 07/01/24

## 2024-07-01 NOTE — LETTER
7/1/2024      Ginger Marshall  [address]  [place] [address]      Dear Colleague,    Thank you for referring your patient, Ginger Marshall, to the [facility] [place]. Please see a copy of my visit note below.    ASSESSMENT & PLAN  Patient Instructions     1. Primary osteoarthritis of left hip        - Patient is seen today for her left hip pain that has been ongoing for a couple of months. She does have back problems that is being treated with therapy. We reviewed her symptoms today and they do seem consistent with hip joint pathology. Her radiographs do demonstrate arthritis on the joint. We discussed management of her symptoms including OTC Tylenol, heat/ice, corticosteroid injections and physical therapy.  -She would like to proceed with an ultrasound-guided steroid injection.  A referral for this has been placed.      -Call direct clinic number [[phone]] at any time with questions or concerns.    -Orthopedic Walk in Monday through Thursday 8 am to 6 pm, Friday 8 am to 5 pm, Saturday 8 am to 12 pm at [address] [facility] [address] [place].          Patsy Rodriguez PA-C  [facility]    -----  Chief Complaint   Patient presents with     Left Hip - Pain       SUBJECTIVE  Ginger Marshall is a/an 84 year old female who is seen as a WALK IN patient for evaluation of left hip pain.  Onset was about 2-3 months without any precipitating event. Patient did fall on that side but is unsure if that caused the pain to start.  Pain is located on the left anterior hip, near the groin, and wraps to the side. Symptoms are worsened by certain movements, walking.  She has tried ice, ibuprofen. Associated symptoms include radiating/referred pain to down the whole leg .  She does tell me that the hip pain in her left hip is very similar to that of her right hip prior to having it replaced.  She does have a history of rheumatoid arthritis.    The patient  "is seen alone    Prior injury/Surgical history of affected joint: None (has had right hip replaced)  Social History/Occupation: Retired    REVIEW OF SYSTEMS:  Pertinent positives/negative: As stated above in HPI    OBJECTIVE:  BP (!) 152/76   Ht 1.651 m (5' 5\")   Wt 95.3 kg (210 lb)   BMI 34.95 kg/m     General: Alert and in no distress  Skin: no visable rashes  CV: Extremities appear well perfused   Resp: normal respiratory effort, no conversational dyspnea   Psych: normal mood, affect  MSK: LEFT Hip Exam    Inspection:  There is no evidence of open wounds  There is no evidence of erythema or infection  There is no obvious ecchymosis    Palpation:  There is no palpable fluctuance  There is no tenderness to palpation at rater trochanter    Range of motion:   Flexion: 90   Internal rotation: 5  External rotation: 20    Strength:   Flexion: 5/5  Abduction: 5/5  Adduction: 5/5    Negative Log roll Test    Dorsalis pedis pulse is palpable and equal to contralateral side    Sensation is intact to light touch in the bilateral lower extremities      Positive FADIR    RADIOLOGY:  Final results and radiologist's interpretation available in the [facility] health record.  Images below were personally reviewed and discussed with the patient in the office today.  My personal interpretation of the performed imaging: Left hip x-rays demonstrate degenerative arthritis changes about the left hip joint.  Right hip has been replaced and hardware is intact.          Again, thank you for allowing me to participate in the care of your patient.        Sincerely,        Patsy Rodriguez PA-C  "

## 2024-07-01 NOTE — PATIENT INSTRUCTIONS
1. Primary osteoarthritis of left hip        - Patient is seen today for her left hip pain that has been ongoing for a couple of months. She does have back problems that is being treated with therapy. We reviewed her symptoms today and they do seem consistent with hip joint pathology. Her radiographs do demonstrate arthritis on the joint. We discussed management of her symptoms including OTC Tylenol, heat/ice, corticosteroid injections and physical therapy.  -She would like to proceed with an ultrasound-guided steroid injection.  A referral for this has been placed.      -Call direct clinic number [[phone]] at any time with questions or concerns.    -Orthopedic Walk in Monday through Thursday 8 am to 6 pm, Friday 8 am to 5 pm, Saturday 8 am to 12 pm at [address] [facility] [address] [place].

## 2024-07-01 NOTE — PROGRESS NOTES
ASSESSMENT & PLAN  Patient Instructions     1. Primary osteoarthritis of left hip        - Patient is seen today for her left hip pain that has been ongoing for a couple of months. She does have back problems that is being treated with therapy. We reviewed her symptoms today and they do seem consistent with hip joint pathology. Her radiographs do demonstrate arthritis on the joint. We discussed management of her symptoms including OTC Tylenol, heat/ice, corticosteroid injections and physical therapy.  -She would like to proceed with an ultrasound-guided steroid injection.  A referral for this has been placed.      -Call direct clinic number [[phone]] at any time with questions or concerns.    -Orthopedic Walk in Monday through Thursday 8 am to 6 pm, Friday 8 am to 5 pm, Saturday 8 am to 12 pm at [address] [facility] [address] [place].          Patsy Rodriguez PA-C  [facility]    -----  Chief Complaint   Patient presents with    Left Hip - Pain       SUBJECTIVE  Ginger Marshall is a/an 84 year old female who is seen as a WALK IN patient for evaluation of left hip pain.  Onset was about 2-3 months without any precipitating event. Patient did fall on that side but is unsure if that caused the pain to start.  Pain is located on the left anterior hip, near the groin, and wraps to the side. Symptoms are worsened by certain movements, walking.  She has tried ice, ibuprofen. Associated symptoms include radiating/referred pain to down the whole leg .  She does tell me that the hip pain in her left hip is very similar to that of her right hip prior to having it replaced.  She does have a history of rheumatoid arthritis.    The patient is seen alone    Prior injury/Surgical history of affected joint: None (has had right hip replaced)  Social History/Occupation: Retired    REVIEW OF SYSTEMS:  Pertinent positives/negative: As stated above in HPI    OBJECTIVE:  BP (!) 152/76   Ht 1.651 m  "(5' 5\")   Wt 95.3 kg (210 lb)   BMI 34.95 kg/m     General: Alert and in no distress  Skin: no visable rashes  CV: Extremities appear well perfused   Resp: normal respiratory effort, no conversational dyspnea   Psych: normal mood, affect  MSK: LEFT Hip Exam    Inspection:  There is no evidence of open wounds  There is no evidence of erythema or infection  There is no obvious ecchymosis    Palpation:  There is no palpable fluctuance  There is no tenderness to palpation at rater trochanter    Range of motion:   Flexion: 90   Internal rotation: 5  External rotation: 20    Strength:   Flexion: 5/5  Abduction: 5/5  Adduction: 5/5    Negative Log roll Test    Dorsalis pedis pulse is palpable and equal to contralateral side    Sensation is intact to light touch in the bilateral lower extremities      Positive FADIR    RADIOLOGY:  Final results and radiologist's interpretation available in the [facility] health record.  Images below were personally reviewed and discussed with the patient in the office today.  My personal interpretation of the performed imaging: Left hip x-rays demonstrate degenerative arthritis changes about the left hip joint.  Right hip has been replaced and hardware is intact.        "

## 2024-07-07 ASSESSMENT — PATIENT HEALTH QUESTIONNAIRE - PHQ9
SUM OF ALL RESPONSES TO PHQ QUESTIONS 1-9: 6
10. IF YOU CHECKED OFF ANY PROBLEMS, HOW DIFFICULT HAVE THESE PROBLEMS MADE IT FOR YOU TO DO YOUR WORK, TAKE CARE OF THINGS AT HOME, OR GET ALONG WITH OTHER PEOPLE: SOMEWHAT DIFFICULT
SUM OF ALL RESPONSES TO PHQ QUESTIONS 1-9: 6

## 2024-07-08 ENCOUNTER — VIRTUAL VISIT (OUTPATIENT)
Dept: PSYCHOLOGY | Facility: CLINIC | Age: 84
End: 2024-07-08
Payer: COMMERCIAL

## 2024-07-08 DIAGNOSIS — F33.1 MODERATE EPISODE OF RECURRENT MAJOR DEPRESSIVE DISORDER (H): Primary | ICD-10-CM

## 2024-07-08 PROCEDURE — 90837 PSYTX W PT 60 MINUTES: CPT | Mod: 95

## 2024-07-08 NOTE — PROGRESS NOTES
[facility]                                     Progress Note    Patient Name: Ginger Marshall  Date: 7/8/24         Service Type: Individual      Session Start Time: 10:00 am  Session End Time: 10:53 am     Session Length: 53 min    Session #: 17    Answers submitted by the patient for this visit:  Patient Health Questionnaire (Submitted on 9/26/2023)  If you checked off any problems, how difficult have these problems made it for you to do your work, take care of things at home, or get along with other people?: Somewhat difficult  PHQ9 TOTAL SCORE: 4    Attendees: Client attended alone    Service Modality:  Video Visit:      Provider verified identity through the following two step process.  Patient provided:  Patient is known previously to provider    Telemedicine Visit: The patient's condition can be safely assessed and treated via synchronous audio and visual telemedicine encounter.      Reason for Telemedicine Visit: Patient convenience (e.g. access to timely appointments / distance to available provider)    Originating Site (Patient Location): Patient's home    Distant Site (Provider Location): Provider Remote Setting- Home Office    Consent:  The patient/guardian has verbally consented to: the potential risks and benefits of telemedicine (video visit) versus in person care; bill my insurance or make self-payment for services provided; and responsibility for payment of non-covered services.     Patient would like the video invitation sent by:  My Chart    Mode of Communication:  Video Conference via [facility]well    Distant Location (Provider):  Off-site    As the provider I attest to compliance with applicable laws and regulations related to telemedicine.    DATA  Interactive Complexity: No     Crisis: No    Extended Session (53+ minutes): PROLONGED SERVICE IN THE OUTPATIENT SETTING REQUIRING DIRECT (FACE-TO-FACE) PATIENT CONTACT BEYOND THE USUAL SERVICE:    - Longer session due to limited  access to mental health appointments and necessity to address patient's distress / complexity  Interactive Complexity: No  Crisis: No      Progress Since Last Session (Related to Symptoms / Goals / Homework):   Symptoms: No change   , low energy    Homework: Partially completed      Episode of Care Goals: Minimal progress - ACTION (Actively working towards change); Intervened by reinforcing change plan / affirming steps taken     Current / Ongoing Stressors and Concerns:  Seeking support for pain management. Daughter providing in home care. Receiving [facility] for support, impatience with responses. Low quality sleep, even with CPAP coming unhooked-waking up tired, nap in afternoon. Focus on committees,  socializing with various [employer] groups.   Ongoing: Grieving loss of adult son who was blind and lived alone, fell and passed away, second loss of an adult/child. Regrets about parenting.      Treatment Objective(s) Addressed in This Session:    Practice boundaries and radical acceptance of reality regarding sister, sons, reality of accident  Improve quantity and quality of night time sleep / decrease daytime naps   Patient will Increase interest, engagement, and pleasure in doing things  Decrease frequency and intensity of feeling down, depressed, hopeless  Improve quantity and quality of night time sleep / decrease daytime naps  Feel less tired and more energy during the day   Improve diet, appetite, mindful eating, and / or meal planning  Identify negative self-talk and behaviors: challenge core beliefs, myths, and actions  Improve concentration, focus, and mindfulness in daily activities   Feel less fidgety, restless or slow in daily activities / interpersonal interactions.     Intervention:  Supportive therapy: Provided active listening and validation. Provided grounding/mindfulness skills    Motivational Interviewing-MAINTAIN  Target Behavior:  radical acceptance of reality, gratitude ,   Proactive  communication with providers, [facility] resources, attend appointments    MI Intervention: Expressed Empathy/Understanding, Supported Autonomy, Collaboration, Evocation and Open-ended questions     Change Talk Expressed by the Patient: Desire to change Ability to change Reasons to change Activation Taking steps    Provider Response to Change Talk: E - Evoked more info from patient about behavior change, A - Affirmed patient's thoughts, decisions, or attempts at behavior change, and R - Reflected patient's change talk    Assessments completed prior to visit:  LAURA  12/5/22  The following assessments were completed by patient for this visit:  PHQ2:       2/27/2023     8:50 AM 2/24/2023     6:48 AM 2/20/2023     5:39 PM 1/15/2023    11:54 AM 11/16/2022     1:33 PM 8/5/2022     8:11 AM 7/6/2022     4:18 PM   PHQ-2 ( 1999 [employer])   Q1: Little interest or pleasure in doing things 0 1 1 1 1 3    Q2: Feeling down, depressed or hopeless 0 1 1 1 1 0    PHQ-2 Score 0 2 2 2 2 3    Q1: Little interest or pleasure in doing things  Several days Several days Several days Several days Nearly every day    Q2: Feeling down, depressed or hopeless  Several days Several days Several days Several days Not at all    PHQ-2 Score  2 2 2 2 3 Incomplete     PHQ9:       1/22/2024     8:44 AM 2/28/2024    10:13 AM 3/19/2024    10:05 AM 4/2/2024     9:02 PM 4/21/2024     1:24 PM 6/25/2024     7:12 AM 7/7/2024     7:30 AM   PHQ-9 SCORE   PHQ-9 Total Score MyChart 5 (Mild depression) 12 (Moderate depression) 8 (Mild depression) 3 (Minimal depression) 5 (Mild depression) 9 (Mild depression) 6 (Mild depression)   PHQ-9 Total Score 5 12 8 3 5 9 6     Patient Health Questionnaire (Submitted on 9/26/2023)  If you checked off any problems, how difficult have these problems made it for you to do your work, take care of things at home, or get along with other people?: Somewhat difficult  PHQ9 TOTAL SCORE: 4  GAD2:       9/23/2023    10:05 AM 11/19/2023    10:24  AM 1/15/2024    10:35 AM 2/16/2024     8:34 AM 3/13/2024     6:28 PM 4/21/2024     1:25 PM 7/7/2024     7:31 AM   SPRING-2   Feeling nervous, anxious, or on edge 0 0 1 1 1 0 0   Not being able to stop or control worrying 0 0 0 0 0 0 0   SPRING-2 Total Score 0 0 1 1 1 0 0     GAD7:       7/6/2022     4:18 PM 8/5/2022     8:11 AM 11/16/2022     1:33 PM 6/5/2023     2:31 PM 2/28/2024    10:14 AM 4/2/2024     9:05 PM 5/31/2024     9:14 AM   SPRING-7 SCORE   Total Score 2 (minimal anxiety) 5 (mild anxiety) 2 (minimal anxiety)  5 (mild anxiety) 1 (minimal anxiety) 1 (minimal anxiety)   Total Score 2 5 2 3 5 1 1     CAGE-AID:       12/5/2022     9:28 AM   CAGE-AID Total Score   Total Score 0   Total Score MyChart 0 (A total score of 2 or greater is considered clinically significant)     PROMIS 10-Global Health (only subscores and total score):       3/25/2022     7:34 AM 12/5/2022     9:28 AM 3/12/2023    10:38 AM 8/23/2023     8:34 AM 12/14/2023     6:28 AM 3/13/2024     6:31 PM 7/7/2024     7:34 AM   PROMIS-10 Scores Only   Global Mental Health Score 12 8    8 8    8 12 13 11 13   Global Physical Health Score 12 13    13 11    11 12 12 11 12   PROMIS TOTAL - SUBSCORES 24 21    21 19    19 24 25 22 25     [place] Suicide Severity Rating Scale (Lifetime/Recent)      12/5/2022    11:00 AM   [place] Suicide Severity Rating (Lifetime/Recent)   Q1 Wished to be Dead (Past Month) no   Q2 Suicidal Thoughts (Past Month) no   Q3 Suicidal Thought Method no   Q4 Suicidal Intent without Specific Plan no   Q5 Suicide Intent with Specific Plan no   Q6 Suicide Behavior (Lifetime) no   Level of Risk per Screen low risk         ASSESSMENT: Current Emotional / Mental Status (status of significant symptoms):   Risk status (Self / Other harm or suicidal ideation)   Patient denies current fears or concerns for personal safety.   Patient denies current or recent suicidal ideation or behaviors.   Patient denies current or recent homicidal ideation or  behaviors.   Patient denies current or recent self injurious behavior or ideation.   Patient denies other safety concerns.   Patient reports there has been no change in risk factors since their last session.     Patient reports there has been no change in protective factors since their last session.     Recommended that patient call 911 or go to the local ED should there be a change in any of these risk factors.     Appearance:   Appropriate    Eye Contact:   Good    Psychomotor Behavior: Normal    Attitude:   Pleasant Attentive   Orientation:   All   Speech    Rate / Production: Normal     Volume:  Normal    Mood:    Normal   Affect:    Appropriate    Thought Content:  Clear    Thought Form:  Coherent  Logical    Insight:    Good      Medication Review:   No changes to current psychiatric medication(s)     Medication Compliance:   Yes     Changes in Health Issues:   None reported     Chemical Use Review:   Substance Use: Chemical use reviewed, no active concerns identified      Tobacco Use: No current tobacco use.      Diagnosis:  1. Moderate episode of recurrent major depressive disorder (H)      Collateral Reports Completed:   Not Applicable    PLAN: (Patient Tasks / Therapist Tasks / Other)   Embrace radical acceptance and boundaries. Use proactive communication with providers, family, try grounding /mindfulness, loving kindness toward self    HECTOR Springer 7/8/2024                                                                                                  Individual Treatment Plan    Patient's Name: Ginger Marshall  YOB: 1940    Date of Creation: 2/8/23  Date Treatment Plan Last Reviewed/Revised:   4/22/24    DSM5 Diagnoses: 296.31 (F33.0) Major Depressive Disorder, Recurrent Episode, Mild With anxious distress  Psychosocial / Contextual Factors: aging, losses, generational trauma  PROMIS (reviewed every 90 days):   21  12/5/22    Referral / Collaboration:  Referral to another  professional/service is not indicated at this time..    Anticipated number of session for this episode of care: 6-9 sessions  Anticipation frequency of session: Every other week  Anticipated Duration of each session: 53 or more minutes  Treatment plan will be reviewed in 90 days or when goals have been changed.     MeasurableTreatment Goal(s) related to diagnosis / functional impairment(s)  Goal 1: Patient will experience an improvement in mood and functioning as evidenced by assessment scores, self report and clinician observation    I will know I've met my goal when things feel better (paraphrase).      Objective #A (Patient Action)    Patient will increase understanding of steps in the grief process   Talk to others about losses  Use prayer practices and jena community to support well being.   Practice boundaries and radical acceptance of reality regarding sister sons, reality of accident   Engage in social activities at [facility]  Status: 7/8/24    Intervention(s)  Therapist will teach CBT, DBT  and support grief processing skills, prayer practices .    Objective #B  Patient will Increase interest, engagement, and pleasure in doing things  Decrease frequency and intensity of feeling down, depressed, hopeless  Improve quantity and quality of night time sleep / decrease daytime naps  Feel less tired and more energy during the day   Improve diet, appetite, mindful eating, and / or meal planning  Identify negative self-talk and behaviors: challenge core beliefs, myths, and actions  Improve concentration, focus, and mindfulness in daily activities   Feel less fidgety, restless or slow in daily activities / interpersonal interactions.  Status: 7/8/2024    Intervention(s)  Therapist will  teach cbt, dbt,MI, mindfulness and behavioral activation and assign homework .      Patient has reviewed and agreed to the above plan.      HECTOR Springer  7/8/2024

## 2024-07-09 ENCOUNTER — THERAPY VISIT (OUTPATIENT)
Dept: PHYSICAL THERAPY | Facility: CLINIC | Age: 84
End: 2024-07-09
Attending: NURSE PRACTITIONER
Payer: COMMERCIAL

## 2024-07-09 ENCOUNTER — OFFICE VISIT (OUTPATIENT)
Dept: ORTHOPEDICS | Facility: CLINIC | Age: 84
End: 2024-07-09
Attending: PHYSICIAN ASSISTANT
Payer: COMMERCIAL

## 2024-07-09 VITALS — BODY MASS INDEX: 34.99 KG/M2 | WEIGHT: 210 LBS | HEIGHT: 65 IN

## 2024-07-09 DIAGNOSIS — G89.29 CHRONIC MIDLINE LOW BACK PAIN WITH LEFT-SIDED SCIATICA: Primary | ICD-10-CM

## 2024-07-09 DIAGNOSIS — M54.42 CHRONIC MIDLINE LOW BACK PAIN WITH LEFT-SIDED SCIATICA: Primary | ICD-10-CM

## 2024-07-09 DIAGNOSIS — M16.12 PRIMARY OSTEOARTHRITIS OF LEFT HIP: ICD-10-CM

## 2024-07-09 PROCEDURE — 20611 DRAIN/INJ JOINT/BURSA W/US: CPT | Mod: LT | Performed by: STUDENT IN AN ORGANIZED HEALTH CARE EDUCATION/TRAINING PROGRAM

## 2024-07-09 PROCEDURE — 97530 THERAPEUTIC ACTIVITIES: CPT | Mod: GP | Performed by: PHYSICAL THERAPIST

## 2024-07-09 PROCEDURE — 97112 NEUROMUSCULAR REEDUCATION: CPT | Mod: GP | Performed by: PHYSICAL THERAPIST

## 2024-07-09 PROCEDURE — 97110 THERAPEUTIC EXERCISES: CPT | Mod: GP | Performed by: PHYSICAL THERAPIST

## 2024-07-09 RX ORDER — ROPIVACAINE HYDROCHLORIDE 5 MG/ML
2 INJECTION, SOLUTION EPIDURAL; INFILTRATION; PERINEURAL
Status: DISCONTINUED | OUTPATIENT
Start: 2024-07-09 | End: 2024-08-06

## 2024-07-09 RX ORDER — LIDOCAINE HYDROCHLORIDE 10 MG/ML
5 INJECTION, SOLUTION INFILTRATION; PERINEURAL
Status: DISCONTINUED | OUTPATIENT
Start: 2024-07-09 | End: 2024-08-06

## 2024-07-09 RX ORDER — BETAMETHASONE SODIUM PHOSPHATE AND BETAMETHASONE ACETATE 3; 3 MG/ML; MG/ML
6 INJECTION, SUSPENSION INTRA-ARTICULAR; INTRALESIONAL; INTRAMUSCULAR; SOFT TISSUE
Status: DISCONTINUED | OUTPATIENT
Start: 2024-07-09 | End: 2024-08-06

## 2024-07-09 RX ADMIN — ROPIVACAINE HYDROCHLORIDE 2 ML: 5 INJECTION, SOLUTION EPIDURAL; INFILTRATION; PERINEURAL at 11:01

## 2024-07-09 RX ADMIN — BETAMETHASONE SODIUM PHOSPHATE AND BETAMETHASONE ACETATE 6 MG: 3; 3 INJECTION, SUSPENSION INTRA-ARTICULAR; INTRALESIONAL; INTRAMUSCULAR; SOFT TISSUE at 11:01

## 2024-07-09 RX ADMIN — LIDOCAINE HYDROCHLORIDE 5 ML: 10 INJECTION, SOLUTION INFILTRATION; PERINEURAL at 11:01

## 2024-07-09 NOTE — PROGRESS NOTES
Sports Medicine Procedure Note    Ginger was seen today for pain.    Diagnoses and all orders for this visit:    Primary osteoarthritis of left hip  -     Orthopedic  Referral    Other orders  -     Large Joint Injection/Arthocentesis: L hip joint        Ginger Marshall is a/an 84 year old female who is seen for Corticosteroid injection of left hip .   Last injection: n/a    -CSI to the left hip performed today under ultrasound guidance, see procedure note below for details  -Post-injection instructions were provided to the patient    Return if symptoms worsen or fail to improve.      Post-Injection Discharge Instructions    You may shower, however avoid swimming, tub baths or hot tubs for 24 hours following your procedure  You may have a mild to moderate increase in pain for a few days following the injection.  The lidocaine (local numbing medicine) will wear off in several hours. It usually takes 3-5 days for the steroid medication to start working although it may take up to 14 days for full effect.   You may use ice packs for 10-15 minutes, 3 to 4 times a day at the injection site for comfort if needed  You may use extra strength Tylenol for pain control if necessary   If you were fasting, you may resume your normal diet and medications after the procedure  If you have diabetes, your blood sugar may be higher than normal for 10-14 days following a steroid injection. Contact your doctor who manages your diabetes if your blood sugar is significantly higher than usual    If you experience any of the following, call Sports Medicine @ [phone] or [phone]  -Fever over 100 degrees F  -Swelling, bleeding, redness, drainage, warmth at the injection site  -New or significant worsening pain        Dr. EPIFANIO Contreras, DO CAQ  [facility]  Sports Medicine     -----    Large Joint Injection/Arthocentesis: L hip joint    Date/Time: 7/9/2024 11:01 AM    Performed by: Kedar Contreras,  DO  Authorized by: Kedar Contreras DO    Needle Size:  22 G  Guidance: ultrasound    Approach:  Anterior  Location:  Hip      Site:  L hip joint  Medications:  6 mg betamethasone acet & sod phos 6 (3-3) MG/ML; 5 mL lidocaine 1 %; 2 mL ROPivacaine 5 MG/ML  Medications comment:  1ml of 8.4% Sodium Bicarbonate solution was used to buffer the local numbing agent for today's injection

## 2024-07-09 NOTE — LETTER
7/9/2024      Ginger Marshall  [address]  [place] [address]      Dear Colleague,    Thank you for referring your patient, Ginger Marshall, to the [facility] [place]. Please see a copy of my visit note below.    Sports Medicine Procedure Note    Ginger was seen today for pain.    Diagnoses and all orders for this visit:    Primary osteoarthritis of left hip  -     Orthopedic  Referral    Other orders  -     Large Joint Injection/Arthocentesis: L hip joint        Ginger Marshall is a/an 84 year old female who is seen for Corticosteroid injection of left hip .   Last injection: n/a    -CSI to the left hip performed today under ultrasound guidance, see procedure note below for details  -Post-injection instructions were provided to the patient    Return if symptoms worsen or fail to improve.      Post-Injection Discharge Instructions    You may shower, however avoid swimming, tub baths or hot tubs for 24 hours following your procedure  You may have a mild to moderate increase in pain for a few days following the injection.  The lidocaine (local numbing medicine) will wear off in several hours. It usually takes 3-5 days for the steroid medication to start working although it may take up to 14 days for full effect.   You may use ice packs for 10-15 minutes, 3 to 4 times a day at the injection site for comfort if needed  You may use extra strength Tylenol for pain control if necessary   If you were fasting, you may resume your normal diet and medications after the procedure  If you have diabetes, your blood sugar may be higher than normal for 10-14 days following a steroid injection. Contact your doctor who manages your diabetes if your blood sugar is significantly higher than usual    If you experience any of the following, call Sports Medicine @ [phone] or [phone]  -Fever over 100 degrees F  -Swelling, bleeding, redness, drainage, warmth at the injection  site  -New or significant worsening pain        Dr. EPIFANIO Contreras DO CAQ  [facility]  Sports Medicine     -----    Large Joint Injection/Arthocentesis: L hip joint    Date/Time: 7/9/2024 11:01 AM    Performed by: Kedar Contreras DO  Authorized by: Kedar Contreras DO    Needle Size:  22 G  Guidance: ultrasound    Approach:  Anterior  Location:  Hip      Site:  L hip joint  Medications:  6 mg betamethasone acet & sod phos 6 (3-3) MG/ML; 5 mL lidocaine 1 %; 2 mL ROPivacaine 5 MG/ML  Medications comment:  1ml of 8.4% Sodium Bicarbonate solution was used to buffer the local numbing agent for today's injection            Again, thank you for allowing me to participate in the care of your patient.        Sincerely,        Kedar Contreras DO

## 2024-07-15 ENCOUNTER — THERAPY VISIT (OUTPATIENT)
Dept: PHYSICAL THERAPY | Facility: CLINIC | Age: 84
End: 2024-07-15
Attending: NURSE PRACTITIONER
Payer: COMMERCIAL

## 2024-07-15 DIAGNOSIS — G89.29 CHRONIC MIDLINE LOW BACK PAIN WITH LEFT-SIDED SCIATICA: Primary | ICD-10-CM

## 2024-07-15 DIAGNOSIS — K21.9 GASTROESOPHAGEAL REFLUX DISEASE, UNSPECIFIED WHETHER ESOPHAGITIS PRESENT: ICD-10-CM

## 2024-07-15 DIAGNOSIS — M54.42 CHRONIC MIDLINE LOW BACK PAIN WITH LEFT-SIDED SCIATICA: Primary | ICD-10-CM

## 2024-07-15 PROCEDURE — 97112 NEUROMUSCULAR REEDUCATION: CPT | Mod: GP | Performed by: PHYSICAL THERAPIST

## 2024-07-15 PROCEDURE — 97110 THERAPEUTIC EXERCISES: CPT | Mod: GP | Performed by: PHYSICAL THERAPIST

## 2024-07-15 RX ORDER — PANTOPRAZOLE SODIUM 40 MG/1
40 TABLET, DELAYED RELEASE ORAL
Qty: 90 TABLET | Refills: 0 | Status: SHIPPED | OUTPATIENT
Start: 2024-07-15

## 2024-07-17 ENCOUNTER — ANCILLARY PROCEDURE (OUTPATIENT)
Dept: GENERAL RADIOLOGY | Facility: CLINIC | Age: 84
End: 2024-07-17
Attending: PHYSICIAN ASSISTANT
Payer: COMMERCIAL

## 2024-07-17 ENCOUNTER — OFFICE VISIT (OUTPATIENT)
Dept: ORTHOPEDICS | Facility: CLINIC | Age: 84
End: 2024-07-17
Payer: COMMERCIAL

## 2024-07-17 VITALS
DIASTOLIC BLOOD PRESSURE: 77 MMHG | BODY MASS INDEX: 34.99 KG/M2 | HEIGHT: 65 IN | SYSTOLIC BLOOD PRESSURE: 133 MMHG | WEIGHT: 210 LBS

## 2024-07-17 DIAGNOSIS — M25.512 ACUTE PAIN OF LEFT SHOULDER: Primary | ICD-10-CM

## 2024-07-17 DIAGNOSIS — M25.512 ACUTE PAIN OF LEFT SHOULDER: ICD-10-CM

## 2024-07-17 PROCEDURE — 20610 DRAIN/INJ JOINT/BURSA W/O US: CPT | Mod: LT | Performed by: PHYSICIAN ASSISTANT

## 2024-07-17 PROCEDURE — 73030 X-RAY EXAM OF SHOULDER: CPT | Mod: TC | Performed by: RADIOLOGY

## 2024-07-17 PROCEDURE — 99213 OFFICE O/P EST LOW 20 MIN: CPT | Mod: 25 | Performed by: PHYSICIAN ASSISTANT

## 2024-07-17 RX ORDER — METHYLPREDNISOLONE ACETATE 40 MG/ML
40 INJECTION, SUSPENSION INTRA-ARTICULAR; INTRALESIONAL; INTRAMUSCULAR; SOFT TISSUE
Status: DISCONTINUED | OUTPATIENT
Start: 2024-07-17 | End: 2024-08-06

## 2024-07-17 RX ORDER — LIDOCAINE HYDROCHLORIDE 10 MG/ML
2 INJECTION, SOLUTION EPIDURAL; INFILTRATION; INTRACAUDAL; PERINEURAL
Status: DISCONTINUED | OUTPATIENT
Start: 2024-07-17 | End: 2024-08-06

## 2024-07-17 RX ORDER — BUPIVACAINE HYDROCHLORIDE 2.5 MG/ML
2 INJECTION, SOLUTION EPIDURAL; INFILTRATION; INTRACAUDAL
Status: DISCONTINUED | OUTPATIENT
Start: 2024-07-17 | End: 2024-08-06

## 2024-07-17 RX ADMIN — METHYLPREDNISOLONE ACETATE 40 MG: 40 INJECTION, SUSPENSION INTRA-ARTICULAR; INTRALESIONAL; INTRAMUSCULAR; SOFT TISSUE at 11:07

## 2024-07-17 RX ADMIN — LIDOCAINE HYDROCHLORIDE 2 ML: 10 INJECTION, SOLUTION EPIDURAL; INFILTRATION; INTRACAUDAL; PERINEURAL at 11:07

## 2024-07-17 RX ADMIN — BUPIVACAINE HYDROCHLORIDE 2 ML: 2.5 INJECTION, SOLUTION EPIDURAL; INFILTRATION; INTRACAUDAL at 11:07

## 2024-07-17 NOTE — PROGRESS NOTES
ASSESSMENT & PLAN  Patient Instructions     1. Acute pain of left shoulder        -Patient is seen today for her left shoulder pain.  Her symptoms seem consistent with a rotator cuff tendinitis.  Her clinical exam is reassuring and suggestive of this condition.  Her radiographs are reviewed and there is degenerative changes but she is nontender about the glenohumeral joint.  We discussed management for this with anti-inflammatories, topical creams, ice, heat, physical therapy and corticosteroid injection.  After reviewing the options she would be okay with physical therapy would like to try steroid injection for pain this has not been relieved with over-the-counter anti-inflammatories.  We discussed risks and benefits of the injection and she wished to proceed.  He was completed without incident.  She is encouraged to take her Band-Aid off in an hour.  Avoid soaking her shoulder for 24 hours.  -Follow-up in sports medicine as needed.    -Call direct clinic number [[phone]] at any time with questions or concerns.    -Orthopedic Walk in Monday through Thursday 8 am to 6 pm, Friday 8 am to 5 pm, Saturday 8 am to 12 pm at [address] [facility] [address] [place] [address].        Patsy Rodriguez PA-C  [facility]    -----  Chief Complaint   Patient presents with    Left Shoulder - Pain       SUBJECTIVE  Ginger Marshall is a/an 84 year old right-handed female who is seen as a self referral for evaluation of left shoulder pain.  Onset was 1.5 months ago. Pain is located top and back of shoulder radiating into neck and down arm. Symptoms are worsened by lifting and overusing arm.  She has tried ibuprofen and ice. Associated symptoms include limited range of motion.    The patient is seen alone    Prior injury/Surgical history of affected joint: rotator cuff surgery around 2010  Social History/Occupation: retired    REVIEW OF SYSTEMS:  Pertinent positives/negative: As stated above in  "HPI    OBJECTIVE:  /77   Ht 1.651 m (5' 5\")   Wt 95.3 kg (210 lb)   BMI 34.95 kg/m     General: Alert and in no distress  Skin: no visable rashes  CV: Extremities appear well perfused   Resp: normal respiratory effort, no conversational dyspnea   Psych: normal mood, affect  MSK: Shoulder exam:  Left    Range of Motion:   Forward flexion: 100 A (painful), 170 P   Abduction:  90 painful  Internal rotation: T8  External rotation: 45    Inspection:    There is no visible deformity  There is no erythema or signs of infection  There is no open wounds    Palpation:     Sternoclavicular joint nontender   Acromioclavicular joint nontender   Subacromial space tender   Posterior shoulder and periscapular region tender   Glenohumeral nontender     Strength:   Abduction (arm at side) 5/5  Internal rotation (arm at side) 5/5  External rotation (arm at side)  5/5  Adduction 5/5    Impingement tests:   Neer (forward flexion) mild pain  Empty can (thumb down in scaption position) painful      Sensation:  Intact to light touch throughout upper extremity     Radial pulse is palpable and equal to contralateral side        RADIOLOGY:  Final results and radiologist's interpretation available in the [facility] health record.  Images below were personally reviewed and discussed with the patient in the office today.  My personal interpretation of the performed imaging: Left shoulder x-rays demonstrate no acute fracture or dislocation.  There are degenerative changes about the glenohumeral joint.    Large Joint Injection/Arthocentesis: L subacromial bursa    Date/Time: 7/17/2024 11:07 AM    Performed by: Patsy Rodriguez PA-C  Authorized by: Patsy Rodriguez PA-C    Indications:  Pain, joint swelling and osteoarthritis  Needle Size:  22 G  Guidance: landmark guided    Location:  Shoulder      Site:  L subacromial bursa  Medications:  40 mg methylPREDNISolone 40 MG/ML; 2 mL BUPivacaine HCl (PF) 0.25 %; 2 mL lidocaine (PF) 1 " %  Outcome:  Tolerated well, no immediate complications  Procedure discussed: discussed risks, benefits, and alternatives    Consent Given by:  Patient  Timeout: timeout called immediately prior to procedure    Prep: patient was prepped and draped in usual sterile fashion

## 2024-07-17 NOTE — PATIENT INSTRUCTIONS
1. Acute pain of left shoulder        -Patient is seen today for her left shoulder pain.  Her symptoms seem consistent with a rotator cuff tendinitis.  Her clinical exam is reassuring and suggestive of this condition.  Her radiographs are reviewed and there is degenerative changes but she is nontender about the glenohumeral joint.  We discussed management for this with anti-inflammatories, topical creams, ice, heat, physical therapy and corticosteroid injection.  After reviewing the options she would be okay with physical therapy would like to try steroid injection for pain this has not been relieved with over-the-counter anti-inflammatories.  We discussed risks and benefits of the injection and she wished to proceed.  He was completed without incident.  She is encouraged to take her Band-Aid off in an hour.  Avoid soaking her shoulder for 24 hours.  -Follow-up in sports medicine as needed.    -Call direct clinic number [[phone]] at any time with questions or concerns.    -Orthopedic Walk in Monday through Thursday 8 am to 6 pm, Friday 8 am to 5 pm, Saturday 8 am to 12 pm at [address] [address]

## 2024-07-17 NOTE — LETTER
7/17/2024      Ginger Marshall  [address]  [facility] [address]      Dear Colleague,    Thank you for referring your patient, Ginger Marshall, to the [facility] [place]. Please see a copy of my visit note below.    ASSESSMENT & PLAN  Patient Instructions     1. Acute pain of left shoulder        -Patient is seen today for her left shoulder pain.  Her symptoms seem consistent with a rotator cuff tendinitis.  Her clinical exam is reassuring and suggestive of this condition.  Her radiographs are reviewed and there is degenerative changes but she is nontender about the glenohumeral joint.  We discussed management for this with anti-inflammatories, topical creams, ice, heat, physical therapy and corticosteroid injection.  After reviewing the options she would be okay with physical therapy would like to try steroid injection for pain this has not been relieved with over-the-counter anti-inflammatories.  We discussed risks and benefits of the injection and she wished to proceed.  He was completed without incident.  She is encouraged to take her Band-Aid off in an hour.  Avoid soaking her shoulder for 24 hours.  -Follow-up in sports medicine as needed.    -Call direct clinic number [[phone]] at any time with questions or concerns.    -Orthopedic Walk in Monday through Thursday 8 am to 6 pm, Friday 8 am to 5 pm, Saturday 8 am to 12 pm at [address] [facility] [address] [place].        Patsy Rodriguez PA-C  [facility]    -----  Chief Complaint   Patient presents with     Left Shoulder - Pain       SUBJECTIVE  Ginger Marshall is a/an 84 year old right-handed female who is seen as a self referral for evaluation of left shoulder pain.  Onset was 1.5 months ago. Pain is located top and back of shoulder radiating into neck and down arm. Symptoms are worsened by lifting and overusing arm.  She has tried ibuprofen and ice. Associated symptoms include  "limited range of motion.    The patient is seen alone    Prior injury/Surgical history of affected joint: rotator cuff surgery around 2010  Social History/Occupation: retired    REVIEW OF SYSTEMS:  Pertinent positives/negative: As stated above in HPI    OBJECTIVE:  /77   Ht 1.651 m (5' 5\")   Wt 95.3 kg (210 lb)   BMI 34.95 kg/m     General: Alert and in no distress  Skin: no visable rashes  CV: Extremities appear well perfused   Resp: normal respiratory effort, no conversational dyspnea   Psych: normal mood, affect  MSK: Shoulder exam:  Left    Range of Motion:   Forward flexion: 100 A (painful), 170 P   Abduction:  90 painful  Internal rotation: T8  External rotation: 45    Inspection:    There is no visible deformity  There is no erythema or signs of infection  There is no open wounds    Palpation:     Sternoclavicular joint nontender   Acromioclavicular joint nontender   Subacromial space tender   Posterior shoulder and periscapular region tender   Glenohumeral nontender     Strength:   Abduction (arm at side) 5/5  Internal rotation (arm at side) 5/5  External rotation (arm at side)  5/5  Adduction 5/5    Impingement tests:   Neer (forward flexion) mild pain  Empty can (thumb down in scaption position) painful      Sensation:  Intact to light touch throughout upper extremity     Radial pulse is palpable and equal to contralateral side        RADIOLOGY:  Final results and radiologist's interpretation available in the [facility] health record.  Images below were personally reviewed and discussed with the patient in the office today.  My personal interpretation of the performed imaging: Left shoulder x-rays demonstrate no acute fracture or dislocation.  There are degenerative changes about the glenohumeral joint.    Large Joint Injection/Arthocentesis: L subacromial bursa    Date/Time: 7/17/2024 11:07 AM    Performed by: Patsy Rodriguez PA-C  Authorized by: Patsy Rodriguez PA-C    Indications:  Pain, " joint swelling and osteoarthritis  Needle Size:  22 G  Guidance: landmark guided    Location:  Shoulder      Site:  L subacromial bursa  Medications:  40 mg methylPREDNISolone 40 MG/ML; 2 mL BUPivacaine HCl (PF) 0.25 %; 2 mL lidocaine (PF) 1 %  Outcome:  Tolerated well, no immediate complications  Procedure discussed: discussed risks, benefits, and alternatives    Consent Given by:  Patient  Timeout: timeout called immediately prior to procedure    Prep: patient was prepped and draped in usual sterile fashion                 Again, thank you for allowing me to participate in the care of your patient.        Sincerely,        Patsy Rodriguez PA-C

## 2024-07-18 ENCOUNTER — OFFICE VISIT (OUTPATIENT)
Dept: ORTHOPEDICS | Facility: CLINIC | Age: 84
End: 2024-07-18
Payer: COMMERCIAL

## 2024-07-18 VITALS — DIASTOLIC BLOOD PRESSURE: 68 MMHG | SYSTOLIC BLOOD PRESSURE: 134 MMHG

## 2024-07-18 DIAGNOSIS — M25.512 ACUTE PAIN OF LEFT SHOULDER: Primary | ICD-10-CM

## 2024-07-18 PROCEDURE — 99214 OFFICE O/P EST MOD 30 MIN: CPT | Performed by: PHYSICIAN ASSISTANT

## 2024-07-18 RX ORDER — NAPROXEN 500 MG/1
500 TABLET ORAL 2 TIMES DAILY WITH MEALS
Qty: 14 TABLET | Refills: 0 | Status: SHIPPED | OUTPATIENT
Start: 2024-07-18 | End: 2024-07-25

## 2024-07-18 NOTE — PROGRESS NOTES
ASSESSMENT & PLAN    Patient Instructions     1. Acute pain of left shoulder        -Patient is seen today for her left shoulder pain that began abruptly in the middle of the night.  She did receive a subacromial steroid injection yesterday which was providing relief.  We discussed that most likely what has happened is that the numbing agent wore off and her pain returned.  We discussed that corticosteroid can take 3 to 5 days to start working or longer.  At this time would recommend conservative management with Tylenol, over-the-counter Salonpas, IcyHot, patches, heat.  A prescription for naproxen was provided.  She is to avoid over-the-counter ibuprofen Advil or Aleve with this medication.  Will provide a sling for comfort at this time.  -Follow-up with sports medicine if symptoms do not improve.    -Call direct clinic number [[phone]] at any time with questions or concerns.    -Orthopedic Walk in Monday through Thursday 8 am to 6 pm, Friday 8 am to 5 pm, Saturday 8 am to 12 pm at [address] [facility] [place].        Patsy ROBERSON [facility] Sports Medicine    -----  Chief Complaint   Patient presents with    Left Shoulder - Follow Up       SUBJECTIVE  Ginger Marshall is a/an 84 year old female who is right hand dominant is seen in follow-up. The patient was last seen 07/17/24. Since last visit,had sharp stabbing  pains which woke her out of sleep.  She states that yesterday she was getting good relief from her injection.    The patient is seen with daughter        REVIEW OF SYSTEMS:  Pertinent positives/negative: As stated above in HPI    OBJECTIVE:  /68    General: Alert and in no distress  Skin: no visable rashes  CV: Extremities appear well perfused   Resp: normal respiratory effort, no conversational dyspnea   Psych: normal mood, affect  MSK:  Shoulder exam:  left    Range of Motion: Pain with all motion  Forward flexion: 120   Abduction:  70  Internal rotation: belt  line  External rotation: 45    Inspection:    There is no visible deformity  There is no erythema or signs of infection  There is no open wounds    Palpation:     Diffuse tenderness about scapula, subacromial space.  Glenohumeral is nontender to palpation.        Sensation:  Intact to light touch throughout upper extremity     Radial pulse is palpable and equal to contralateral side

## 2024-07-18 NOTE — PATIENT INSTRUCTIONS
1. Acute pain of left shoulder        -Patient is seen today for her left shoulder pain that began abruptly in the middle of the night.  She did receive a subacromial steroid injection yesterday which was providing relief.  We discussed that most likely what has happened is that the numbing agent wore off and her pain returned.  We discussed that corticosteroid can take 3 to 5 days to start working or longer.  At this time would recommend conservative management with Tylenol, over-the-counter Salonpas, IcyHot, patches, heat.  A prescription for naproxen was provided.  She is to avoid over-the-counter ibuprofen Advil or Aleve with this medication.  Will provide a sling for comfort at this time.  -Follow-up with sports medicine if symptoms do not improve.    -Call direct clinic number [[phone]] at any time with questions or concerns.    -Orthopedic Walk in Monday through Thursday 8 am to 6 pm, Friday 8 am to 5 pm, Saturday 8 am to 12 pm at [address] [facility] [address] [place].

## 2024-07-18 NOTE — LETTER
7/18/2024      Ginger Marshall  [address]  [place] [facility] [address]      Dear Colleague,    Thank you for referring your patient, Ginger Masrhall, to the [facility] [place]. Please see a copy of my visit note below.    ASSESSMENT & PLAN    Patient Instructions     1. Acute pain of left shoulder        -Patient is seen today for her left shoulder pain that began abruptly in the middle of the night.  She did receive a subacromial steroid injection yesterday which was providing relief.  We discussed that most likely what has happened is that the numbing agent wore off and her pain returned.  We discussed that corticosteroid can take 3 to 5 days to start working or longer.  At this time would recommend conservative management with Tylenol, over-the-counter Salonpas, IcyHot, patches, heat.  A prescription for naproxen was provided.  She is to avoid over-the-counter ibuprofen Advil or Aleve with this medication.  Will provide a sling for comfort at this time.  -Follow-up with sports medicine if symptoms do not improve.    -Call direct clinic number [[phone]] at any time with questions or concerns.    -Orthopedic Walk in Monday through Thursday 8 am to 6 pm, Friday 8 am to 5 pm, Saturday 8 am to 12 pm at [address] [facility] [address] [place].        Patsy Rodriguez PA-C  [facility] Sports Medicine    -----  Chief Complaint   Patient presents with     Left Shoulder - Follow Up       SUBJECTIVE  Ginger Marshall is a/an 84 year old female who is right hand dominant is seen in follow-up. The patient was last seen 07/17/24. Since last visit,had sharp stabbing  pains which woke her out of sleep.  She states that yesterday she was getting good relief from her injection.    The patient is seen with daughter        REVIEW OF SYSTEMS:  Pertinent positives/negative: As stated above in HPI    OBJECTIVE:  /68    General: Alert and in no distress  Skin: no visable rashes  CV:  Extremities appear well perfused   Resp: normal respiratory effort, no conversational dyspnea   Psych: normal mood, affect  MSK:  Shoulder exam:  left    Range of Motion: Pain with all motion  Forward flexion: 120   Abduction:  70  Internal rotation: belt line  External rotation: 45    Inspection:    There is no visible deformity  There is no erythema or signs of infection  There is no open wounds    Palpation:     Diffuse tenderness about scapula, subacromial space.  Glenohumeral is nontender to palpation.        Sensation:  Intact to light touch throughout upper extremity     Radial pulse is palpable and equal to contralateral side            Again, thank you for allowing me to participate in the care of your patient.        Sincerely,        Patsy Rodriguez PA-C

## 2024-07-23 ENCOUNTER — THERAPY VISIT (OUTPATIENT)
Dept: PHYSICAL THERAPY | Facility: CLINIC | Age: 84
End: 2024-07-23
Payer: COMMERCIAL

## 2024-07-23 DIAGNOSIS — M54.42 CHRONIC MIDLINE LOW BACK PAIN WITH LEFT-SIDED SCIATICA: Primary | ICD-10-CM

## 2024-07-23 DIAGNOSIS — G89.29 CHRONIC MIDLINE LOW BACK PAIN WITH LEFT-SIDED SCIATICA: Primary | ICD-10-CM

## 2024-07-23 PROCEDURE — 97110 THERAPEUTIC EXERCISES: CPT | Mod: GP | Performed by: PHYSICAL THERAPIST

## 2024-07-23 PROCEDURE — 97112 NEUROMUSCULAR REEDUCATION: CPT | Mod: GP | Performed by: PHYSICAL THERAPIST

## 2024-07-29 ENCOUNTER — THERAPY VISIT (OUTPATIENT)
Dept: PHYSICAL THERAPY | Facility: CLINIC | Age: 84
End: 2024-07-29
Payer: COMMERCIAL

## 2024-07-29 DIAGNOSIS — G89.29 CHRONIC BILATERAL LOW BACK PAIN WITHOUT SCIATICA: Primary | ICD-10-CM

## 2024-07-29 DIAGNOSIS — M54.50 CHRONIC BILATERAL LOW BACK PAIN WITHOUT SCIATICA: Primary | ICD-10-CM

## 2024-07-29 PROCEDURE — 97110 THERAPEUTIC EXERCISES: CPT | Mod: GP | Performed by: PHYSICAL THERAPIST

## 2024-08-04 ASSESSMENT — PAIN SCALES - PAIN ENJOYMENT GENERAL ACTIVITY SCALE (PEG)
AVG_PAIN_PASTWEEK: 6
INTERFERED_ENJOYMENT_LIFE: 6
INTERFERED_GENERAL_ACTIVITY: 4
PEG_TOTALSCORE: 5.33

## 2024-08-06 ENCOUNTER — MYC MEDICAL ADVICE (OUTPATIENT)
Dept: PALLIATIVE MEDICINE | Facility: CLINIC | Age: 84
End: 2024-08-06

## 2024-08-06 ENCOUNTER — OFFICE VISIT (OUTPATIENT)
Dept: PALLIATIVE MEDICINE | Facility: CLINIC | Age: 84
End: 2024-08-06
Attending: NURSE PRACTITIONER
Payer: COMMERCIAL

## 2024-08-06 VITALS — SYSTOLIC BLOOD PRESSURE: 138 MMHG | DIASTOLIC BLOOD PRESSURE: 71 MMHG | OXYGEN SATURATION: 97 % | HEART RATE: 74 BPM

## 2024-08-06 DIAGNOSIS — M51.369 DDD (DEGENERATIVE DISC DISEASE), LUMBAR: ICD-10-CM

## 2024-08-06 DIAGNOSIS — M05.741 RHEUMATOID ARTHRITIS INVOLVING RIGHT HAND WITH POSITIVE RHEUMATOID FACTOR (H): Chronic | ICD-10-CM

## 2024-08-06 DIAGNOSIS — M54.42 CHRONIC MIDLINE LOW BACK PAIN WITH LEFT-SIDED SCIATICA: ICD-10-CM

## 2024-08-06 DIAGNOSIS — G89.29 CHRONIC MIDLINE LOW BACK PAIN WITH LEFT-SIDED SCIATICA: ICD-10-CM

## 2024-08-06 DIAGNOSIS — M25.512 ACUTE PAIN OF LEFT SHOULDER: ICD-10-CM

## 2024-08-06 DIAGNOSIS — M54.42 CHRONIC MIDLINE LOW BACK PAIN WITH LEFT-SIDED SCIATICA: Primary | ICD-10-CM

## 2024-08-06 DIAGNOSIS — G89.29 CHRONIC MIDLINE LOW BACK PAIN WITH LEFT-SIDED SCIATICA: Primary | ICD-10-CM

## 2024-08-06 DIAGNOSIS — M48.061 SPINAL STENOSIS OF LUMBAR REGION WITHOUT NEUROGENIC CLAUDICATION: ICD-10-CM

## 2024-08-06 DIAGNOSIS — G47.33 OSA (OBSTRUCTIVE SLEEP APNEA): ICD-10-CM

## 2024-08-06 DIAGNOSIS — M53.3 SACROILIAC JOINT PAIN: Primary | ICD-10-CM

## 2024-08-06 PROCEDURE — 99214 OFFICE O/P EST MOD 30 MIN: CPT | Performed by: NURSE PRACTITIONER

## 2024-08-06 RX ORDER — NAPROXEN 500 MG/1
500 TABLET ORAL 2 TIMES DAILY PRN
Qty: 30 TABLET | Refills: 0 | Status: SHIPPED | OUTPATIENT
Start: 2024-08-06 | End: 2024-09-11

## 2024-08-06 RX ORDER — NAPROXEN SODIUM 220 MG
440 TABLET ORAL DAILY PRN
Qty: 60 TABLET | Refills: 0 | Status: SHIPPED | OUTPATIENT
Start: 2024-08-06 | End: 2024-08-06 | Stop reason: ALTCHOICE

## 2024-08-06 ASSESSMENT — PAIN SCALES - GENERAL: PAINLEVEL: MILD PAIN (3)

## 2024-08-06 NOTE — PROGRESS NOTES
[facility]   Date of Visit: 8/6/2024  Last visit: 6/4/2024  Original Consult: 02/16/2016- 6/1/2016 treated with Cramen Torres, 6/4/2024 first visit with me    Ginger is being seen today for ongoing management of chronic pain.     History:  Chronic lower back pain for many years. Has had multiple spinal injections without long term relief. She states that she is not interested in any  more injections as they do not seem to provide long term relief. She did feel that the last injection she had gave her relief of left leg pain.   Multiple joint pain    Recommendations from last visit:  Will have her restart Lyrica, with ramp up from 50 mg once a day to TID over a 15 day period. Refills sent in for next prescription. Also advised Pain PT after completing her current PT course. She is in agreement with this approach. She would prefer to follow-up with her PCP if Lyrica and PT are effective, I asked her to follow-up with me in 2-3 months if needed.     ____________________________________________________________  Interval History   - Leg pain is much better since increase in Lyrica dose. This has been a relief.  - Has been participating in Pain PT and has found this to be helpful.    - Lower back pain is more flared up, the left side only. Feels like it is consistent with the pain she had in the past in her SI joint. Per records, she has had significant relief with SI joint steroid injections.   - Pain can wake her up at night and keeps her from falling asleep at times.   -She has increased pain with cleaning her bathroom, standing up to cook for longer periods. She can walk as long as she wants if she has a walker with her. Still walks her dog daily. Planning on going to the [facility] with a friend tomorrow.     Pain description:  Location: left lower back  Quality: burning, sharp, throbbing   Duration: intermittent, minimal pain at rest    Severity/Intensity: Mild Pain (3)  Aggravating  factors include: standing, walking  Relieving factors include: exercise    Pain Intensity and Interference in the past week  Date PEG   6/4/24 5.67   8/6/2024 5.33         Current pain medications:  acetaminophen prn  Naproxen prn  Pregabalin 50 mg TID     Other relevant medications:  Sertraline  Rinvoq    PAIN MANAGEMENT TREATMENT HISTORY  1. MEDICATIONS:  Opioids: Hydrocodone, Percocet, Tramadol -SE, Migraines  NSAIDs: Celecoxib (allergy), Naproxen  Muscle relaxants: Cyclobenzaprine, Tizanidine - doesn't recall response  Pain Antidepressants/ Anxiolytics: Duloxetine   Sleep Aids: Ambien (helpful), Rozarem (unsure)   Neuroleptics: Gabapentin -not helpful, Pregabalin unsure if helpful  Topical: Lidoderm patch, Other gels, creams, patches or ointments   Adjuvant medications: Acetaminophen - somewhat helpful for headaches, Prednisone  2. PHYSICAL THERAPY:   Started Pain focused PT with Margaux Strong on 7/1/24, has completed 5 visits  3. PAIN PSYCHOLOGY:   Has not tried  4. SURGERY:   L4-5 hemilaminectomy  5. INJECTIONS:   [facility] pain clinic   - BL L5-S1 TF OLIVIA 2/20/2024 - 2 weeks of relief  [facility]   -Left L4-5 TF OLIVIA 1/2022   - L4-5 TF OLIVIA 4/2021- no relief   - BL L4-S1 MBB 11/2021- no relief   - Bilateral SI joint injections 9/2021  [facility]  T10- T11 IL OLIVIA with Dr. Renteria 1/23/2020  Caudal OLIVIA with Dr. Hare 7/1/2019  B SI joint injections, right gluteal TPI with Dr. Hare 3/3/2016, 9/26/2016  L5-S1 IL OLIVIA with Dr. Cota  6. COMPLEMENTARY THERAPY:  Chiropractic: Has not tried  Acupuncture/acupressure: Tried, does not recall result  TENS unit: Tried, does not recall result  heat/cold applications: ice is helpful when she uses it.  7. PREVIOUS PAIN CLINIC:  Ginger has been seen at [facility], [facility], [facility], [facility], and [facility] in the past      Past Medical History   She has a past medical history of Acute posthemorrhagic anemia (10/13/2012), Closed fracture of multiple ribs of left  side, initial encounter (2019), Closed fracture of olecranon process of right ulna with routine healing (2023), Closed fracture of shaft of left ulna, unspecified fracture morphology, initial encounter (2023), COPD (chronic obstructive pulmonary disease) (H) (), Depressive disorder, Ex-smoker (1999), Gastroenteritis (2020), Herniated nucleus pulposus, L3-4 (2017), Hip joint replacement by other means (07/10/2008), History of blood transfusion, History of total hip replacement (10/11/2012), History of total knee replacement (2009), Menopause (), Migraine (2014), Multinodular goiter, Other chronic pain, Other closed intra-articular fracture of distal end of left radius, initial encounter (2023), Pelvic fracture (H) (2014), Rheumatic mitral stenosis, Rheumatoid arteritis (H), S/P lumbar fusion (2017), Sleep apnea, and Vitamin B12 deficiency.   Past Surgical History   She has a past surgical history that includes  section (); orthopedic surgery (); orthopedic surgery (); arthroscopy knee rt/lt (2006); TOTAL KNEE ARTHROPLASTY (); HAND/FINGER SURGERY UNLISTED (2005); colonoscopy (); ANESTH,TOTAL HIP ARTHROPLASTY (); ESOPH/GAS REFLUX TEST W NASAL IMPED >1 HR (2012); IR Translaminar Epidural Lumbar Inj Incl Imaging (2012); orthopedic surgery (); Breast surgery (); back surgery (); cataract iol, rt/lt (Bilateral, ); Optical tracking system fusion spine posterior lumbar one level (N/A, 2017);  section (); Total Knee Arthroplasty (Left); Total Knee Arthroplasty (Right); Total Hip Arthroplasty (Right); Lumbar Fusion; Abdomen surgery; Arthroscopy knee (Left); Biopsy breast; Breast surgery; Hand surgery (Right); Foot surgery (Left); Hand surgery (Right); Cataract Extraction (Bilateral); Colonoscopy; Ir Lumbar Epidural Steroid Injection; Finger surgery (Right, 2019); GYN  surgery ([age]); Open reduction internal fixation wrist (Left, 2023); and Open reduction internal fixation elbow (Right, 2023).   Social History   She reports that she quit smoking about 25 years ago. Her smoking use included cigarettes. She started smoking about 66 years ago. She has a 41 pack-year smoking history. She has never used smokeless tobacco. She reports current alcohol use. She reports that she does not use drugs.  Social History     Social History Narrative    She lives in a a senior living apartment building.    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  Medications    has a current medication list which includes the following prescription(s): acetaminophen, albuterol, atorvastatin, buspirone, calcium citrate, carboxymethylcellulose pf, cholecalciferol, ferrous gluconate, hydrocortisone, ipratropium, leucovorin, loratadine, rasuvo, naproxen sodium, olopatadine, pantoprazole, pregabalin, sertraline, triamcinolone, UNABLE TO FIND, rinvoq, valacyclovir, vitamin c, zoledronic acid, and fluticasone.  Allergies   Abatacept; Celebrex [tyler-2 inhibitors]; Celecoxib; Levaquin [levofloxacin]; Orencia [abatacept]; Septra [bactrim]; Sulfa antibiotics; Sulfamethoxazole-trimethoprim; Tramadol; Valdecoxib; Adhesive tape; and Antihistamines, chlorpheniramine-type  [antihistamines, chlorpheniramine-type]  Objective   /71 (BP Location: Right arm, Patient Position: Chair, Cuff Size: Adult Regular)   Pulse 74   SpO2 97%   Constitutional: Well developed, well nourished, appears stated age. No acute distress.  Gait is steady and uses walker  HEENT: Head atraumatic, normocephalic. Eyes without conjunctival injection or jaundice. Neck supple.   Skin: No obvious rash, lesions, or petechiae of exposed skin.   Extremities: Peripheral pulses intact. No clubbing, cyanosis, or edema. Moves all  extremities.  Psychiatric/mental status: Alert, without lethargy or stupor. Speech fluent. Appropriate affect. Mood normal. Able to follow commands without difficulty.   Musculoskeletal exam: Normal bulk and tone. Unremarkable spinal curvature. BLE strength and sensation grossly intact.  Significant Results and Procedures    Imaging:  Copied from [facility] note:      Copied from [facility] Note    Labs:  Creatinine   Date Value Ref Range Status   05/14/2024 0.55 0.51 - 0.95 mg/dL Final     AST   Date Value Ref Range Status   05/14/2024 29 0 - 45 U/L Final     Comment:     Reference intervals for this test were updated on 6/12/2023 to more accurately reflect our healthy population. There may be differences in the flagging of prior results with similar values performed with this method. Interpretation of those prior results can be made in the context of the updated reference intervals.     ALT   Date Value Ref Range Status   05/14/2024 23 0 - 50 U/L Final     Comment:     Reference intervals for this test were updated on 6/12/2023 to more accurately reflect our healthy population. There may be differences in the flagging of prior results with similar values performed with this method. Interpretation of those prior results can be made in the context of the updated reference intervals.           Assessment & Plan   SI joint pain  Chronic midline low back pain with left-sided sciatica  DDD (degenerative disc disease), lumbar  Spinal stenosis of lumbar region without neurogenic claudication  Continue Lyrica 50 mg TID. Ordered left SI joint injection. She will continue Pain PT. Advised intermittent naproxen use, especially if she is planning on being more active. Advised follow-up if symptoms are worsening or not improved with SI joint injection.    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     SHERRY (obstructive sleep apnea)-severe (AHI 35)  Established chronic issue that was considered when making medical decisions; no current exacerbations or new concerns.     Kayla Mathur, CNP-BC, PMGT-BC, AP-PMN  [facility]

## 2024-08-06 NOTE — TELEPHONE ENCOUNTER
Routing to review and advise    Looks like Sports [facility] gave pt a 7 day course of Naproxen 500mg  BID on 7/18 and this is what she is wanting to continue. Any indication to do this vs PTA med sent today?    Lolis AUGUSTINE RN Care Coordinator  [facility]        Ginger PINEDA Pain Nurse (supporting Kayla Mathur, NP)1 hour ago (11:58 AM)       The pharmacy said the prescription you sent in is the same as aleve and that doesn't need a prescription.   Is this what you intended or did you mean to prescribed a stronger dose (500mg as I had before)?     Thank you,   Ginger Marshall   [phone]   [email or web address]

## 2024-08-06 NOTE — TELEPHONE ENCOUNTER
I only saw the 220 mg order in her history. I can send one for the prescription dose if this is what she would like to use.

## 2024-08-06 NOTE — PATIENT INSTRUCTIONS
Medications: Naproxen refill sent to your pharmacy.   Procedures: left SI joint injection order placed. The  will contact you to set up your injection.   Please follow up with me in 3-4 weeks to reassess symptoms and response to treatment.   Continue Pain Physical Therapy.  _____________________________________________________  Pain Clinic telephone number: [phone]. Messages are returned Monday - Friday between 8 AM and 4:00 PM.  If you need a medication refilled, call the clinic or send a [employer] message with the medication name and pharmacy 7 days in advance as it may take 3-4 days to process.  We do not refill medications on evenings or weekends.

## 2024-08-07 ENCOUNTER — TELEPHONE (OUTPATIENT)
Dept: PALLIATIVE MEDICINE | Facility: CLINIC | Age: 84
End: 2024-08-07
Payer: COMMERCIAL

## 2024-08-07 DIAGNOSIS — M53.3 SACROILIAC JOINT PAIN: Primary | ICD-10-CM

## 2024-08-07 NOTE — TELEPHONE ENCOUNTER
"Screening Questions for Radiology Injections:    Injection to be done at which interventional clinic site? [facility]    If choosing [facility] for location, please inform patient:  \"[facility] is a Hospital based clinic. Before your visit, you should check with your insurance about how it covers the charges for facility services in a hospital-based clinic.     Procedure ordered by Kayla Mathur     Procedure ordered? left SI joint injection   Transforaminal Cervical OLIVIA - Send to [facility] ([facility]) - No [facility] Site providers perform this procedure    What insurance would patient like us to bill for this procedure? [facility]  IF SCHEDULING IN [place] [facility] OR SPINE PLEASE SCHEDULE AT LEAST 7-10 BUSINESS DAYS OUT SO A PA CAN BE OBTAINED  Worker's comp or MVA (motor vehicle accident) -Any injection DO NOT SCHEDULE and route to Ne Vidal.    [employer] insurance - For SI joint injections, DO NOT SCHEDULE and route to Roseanne Agarwal.     ALL BCBS, [employer] and [employer] - DO NOT SCHEDULE and route to Roseanne Agarwal  MEDICA- ALL INJECTIONS- route to Roseanne Agarwal    Is patient scheduled at [facility]? NO   If YES, route every encounter to [facility] SPINE CENTER CARE NAVIGATION POOL [[phone]]    Is an  needed? No     Patient has a  home? (Review Grid) Not Applicable    Any chance of pregnancy? NO   If YES, do NOT schedule and route to RN angel  - Dr. Saab route to Rocío Johnson and PM&R Nurse  [[id number]]      Is patient actively being treated for cancer or immunocompromised? No  If YES, do NOT schedule and route to RN pool/ Dr. Saab's Team    Does the patient have a bleeding or clotting disorder? No   If YES, okay to schedule AND route to RENATO joshua / Dr. Saab's Team   (For any patients with platelet count <100, RN must forward to provider)    Is patient taking any Blood Thinners OR Antiplatelet medication?  No   If hold needed, do NOT schedule, route to RN pool/ Dr." Katiana's Team  Examples:   Blood Thinners: (Coumadin, Warfarin, Jantoven, Pradaxa, Xarelto, Eliquis, Edoxaban, Enoxaparin, Lovenox, Heparin, Arixtra, Fondaparinux or Fragmin)  Antiplatelet Medications: (Plavix, Brilinta or Effient)     Is patient taking any aspirin products (includes Excedrin and Fiorinal)? No   If yes route to RN pool/ Dr. Saab's Team - Do not schedule    Is patient taking any GLP-1 Antagonist (hold needed for sedation patients only) No   (semaglutide (Ozempic, Wegovy), dulaglutide (Trulicity), exenatide ER (Bydureon), tirzepatide (Mounjaro), Liraglutide (Saxenda, Victoza), semaglutide (Rybelsus), Terzepatide (Zepbound)  If YES, okay to schedule AND route to RN nurse / Dr. Saab's Team      Any allergies to contrast dye, iodine, shellfish, or numbing and steroid medications? No  If YES, schedule and add allergy information to appointment notes AND route to the RN pool/ Dr. Saab's Team  If OLIVIA and Contrast Dye / Iodine Allergy? DO NOT SCHEDULE, route to RN pool/ Dr. Saab's Team  Allergies: Abatacept; Celebrex [tyler-2 inhibitors (sulfonamide)]; Celecoxib; Levaquin [levofloxacin]; Orencia [abatacept]; Septra [bactrim]; Sulfa antibiotics; Sulfamethoxazole-trimethoprim; Tramadol; Valdecoxib; Adhesive tape; and Antihistamines, chlorpheniramine-type  [antihistamines, chlorpheniramine-type]     Does patient have an active infection or treated for one within the past week? No  Is patient currently taking any antibiotics or steroid medications?  No   For patients on chronic, preventative, or prophylactic antibiotics, procedures may be scheduled.   For patients on antibiotics for active or recent infection, schedule 4 days after completed.  For patients on steroid medications, schedule 4 days after completed.     Has the patient had a flu shot or any other vaccinations within the past 7 days? No  If yes, explain that for the vaccine to work best they need to:     wait 1 week before and 1 week after  getting any Vaccine  wait 1 week before and 2 weeks after getting any Covid Vaccine   If patient has concerns about the timing, send to RN pool/ Dr. Saab's Team    Does patient have an MRI/CT?  Not Applicable Include Date and Check Procedure Scheduling Grid to see if required.  Was the MRI/CT done within the last 3 years?  NA   If no route to RN Pool/ Dr. Weekss Team  If yes, where was the MRI/CT done?    Refer to PACS Transmissions list for approved external locations and route to RN Pool High Priority/ Dr. Weekss Team  If MRI was not done at approved external location do NOT schedule and route to RN pool/ Dr. Weekss Team    If patient has an imaging disc, the injection MAY be scheduled but patient must bring disc to appt or appt will be cancelled.    Is patient able to transfer to a procedure table with minimal or no assistance? Yes   If no, do NOT schedule and route to RN Pool/ Dr. Weekss Team    Procedure Specific Instructions:  If celiac plexus block, informed patient NPO for 6 hours and that it is okay to take medications with sips of water, especially blood pressure medications Not Applicable       If this is for a cervical procedure, informed patient that aspirin needs to be held for 6 days.   Not Applicable    Sedation, If Sedation is ordered for any procedure, patient must be NPO for 6 hours prior to procedure Not Applicable    If IV needed:  Do not schedule procedures requiring IV placement in the first appointment of the day or first appointment after lunch. Do NOT schedule at 0745, 0815 or 1245.     Instructed patient to arrive 30 minutes early for IV start if required. (Check Procedure Scheduling Grid)  Not Applicable    Reminders:  If you are started on any steroids or antibiotics between now and your appointment, you must contact us because the procedure may need to be cancelled.  Yes    As a reminder, receiving steroids can decrease your body's ability to fight infection.   Would you still  like to move forward with scheduling the injection?  Yes    IV Sedation is not provided for procedures. If oral anti-anxiety medication is needed, the patient should request this from their referring provider.    Instruct patient to arrive as directed prior to the scheduled appointment time:  If IV needed 30 minutes before appointment time     For patients 85 or older we recommend having an adult stay w/ them for the remainder of the day.     If the patient is Diabetic, remind them to bring their glucometer.      Does the patient have any questions?  NO  Angie Arrington  [facility]

## 2024-08-09 ENCOUNTER — MYC MEDICAL ADVICE (OUTPATIENT)
Dept: OTOLARYNGOLOGY | Facility: CLINIC | Age: 84
End: 2024-08-09
Payer: COMMERCIAL

## 2024-08-09 NOTE — TELEPHONE ENCOUNTER
I was going through my open encounters and am unable to close the encounter on 06/20/24.     If you can please sign your portion of the encounter and close it, that would be greatly appreciated. This way I can then close the entire encounter and clear it from my InBasket.     Thank you so much and have a great day!    Lucille Rob   [facility]

## 2024-08-09 NOTE — TELEPHONE ENCOUNTER
Left Voicemail (1st Attempt) for the patient to call back and schedule the following:    Appointment type: New Throat Panel   Provider: Dr. Vázquez  Return date: next available     Specialty phone number: [phone]  Additional appointment(s) needed:   Additional Notes:

## 2024-08-12 ENCOUNTER — THERAPY VISIT (OUTPATIENT)
Dept: PHYSICAL THERAPY | Facility: CLINIC | Age: 84
End: 2024-08-12
Payer: COMMERCIAL

## 2024-08-12 DIAGNOSIS — M51.369 DDD (DEGENERATIVE DISC DISEASE), LUMBAR: Primary | ICD-10-CM

## 2024-08-12 DIAGNOSIS — M54.50 CHRONIC BILATERAL LOW BACK PAIN WITHOUT SCIATICA: Primary | ICD-10-CM

## 2024-08-12 DIAGNOSIS — G89.29 CHRONIC BILATERAL LOW BACK PAIN WITHOUT SCIATICA: Primary | ICD-10-CM

## 2024-08-12 PROCEDURE — 97110 THERAPEUTIC EXERCISES: CPT | Mod: GP | Performed by: PHYSICAL THERAPIST

## 2024-08-12 PROCEDURE — 97032 APPL MODALITY 1+ESTIM EA 15: CPT | Mod: GP | Performed by: PHYSICAL THERAPIST

## 2024-08-12 PROCEDURE — 97112 NEUROMUSCULAR REEDUCATION: CPT | Mod: GP | Performed by: PHYSICAL THERAPIST

## 2024-08-12 ASSESSMENT — PATIENT HEALTH QUESTIONNAIRE - PHQ9
SUM OF ALL RESPONSES TO PHQ QUESTIONS 1-9: 10
10. IF YOU CHECKED OFF ANY PROBLEMS, HOW DIFFICULT HAVE THESE PROBLEMS MADE IT FOR YOU TO DO YOUR WORK, TAKE CARE OF THINGS AT HOME, OR GET ALONG WITH OTHER PEOPLE: VERY DIFFICULT
SUM OF ALL RESPONSES TO PHQ QUESTIONS 1-9: 10

## 2024-08-13 ENCOUNTER — OFFICE VISIT (OUTPATIENT)
Dept: FAMILY MEDICINE | Facility: CLINIC | Age: 84
End: 2024-08-13
Payer: COMMERCIAL

## 2024-08-13 VITALS
RESPIRATION RATE: 18 BRPM | TEMPERATURE: 97.8 F | DIASTOLIC BLOOD PRESSURE: 70 MMHG | BODY MASS INDEX: 34.55 KG/M2 | OXYGEN SATURATION: 93 % | WEIGHT: 215 LBS | SYSTOLIC BLOOD PRESSURE: 119 MMHG | HEART RATE: 78 BPM | HEIGHT: 66 IN

## 2024-08-13 DIAGNOSIS — Z76.89 SLEEP CONCERN: ICD-10-CM

## 2024-08-13 DIAGNOSIS — F33.42 MAJOR DEPRESSIVE DISORDER, RECURRENT EPISODE, IN FULL REMISSION (H): Primary | ICD-10-CM

## 2024-08-13 PROCEDURE — 99214 OFFICE O/P EST MOD 30 MIN: CPT | Performed by: STUDENT IN AN ORGANIZED HEALTH CARE EDUCATION/TRAINING PROGRAM

## 2024-08-13 RX ORDER — BUPROPION HYDROCHLORIDE 150 MG/1
150 TABLET ORAL EVERY MORNING
Qty: 30 TABLET | Refills: 0 | Status: SHIPPED | OUTPATIENT
Start: 2024-08-13 | End: 2024-09-17

## 2024-08-13 ASSESSMENT — PAIN SCALES - GENERAL: PAINLEVEL: EXTREME PAIN (8)

## 2024-08-13 NOTE — PROGRESS NOTES
"  Assessment & Plan       Major depressive disorder, recurrent episode, in full remission (H24)  PHQ of 10 while on 100mg sertraline and 10mg buspirone BID. Feels ongoing symptoms of insomnia, apathy, lack of motivation, appetite, etc. No significant symptoms of anxiety. Plan to stop buspirone and start bupropion to augment mood. Did consider insomnia worsening with bupropion but had success with this medication in past for smoking cessation thus will closely monitor. Is seeing a therapist. Follow up in one month to reassess.  - buPROPion (WELLBUTRIN XL) 150 MG 24 hr tablet  Dispense: 30 tablet; Refill: 0    Sleep concern  Difficulty with both sleep onset and maintenance. Does eat chocolate later in the day and take frequent naps. Reviewed sleep hygiene with patient, may be due to underlying depression, see above. If still problematic, briefly discussed melatonin 0.5mg 4H prior to bedtime.     BMI  Estimated body mass index is 35.23 kg/m  as calculated from the following:    Height as of this encounter: 1.664 m (5' 5.5\").    Weight as of this encounter: 97.5 kg (215 lb).     Follow up in one month to reassess    Duy Leyva MD  [facility]  8/13/2024      Alejandro Miles is a 84 year old, presenting for the following health issues:  Sleep Problem and Weight Loss        8/13/2024    10:15 AM   Additional Questions   Roomed by Dahlia ACEVES CMA   Accompanied by ARETHA         8/13/2024    10:15 AM   Patient Reported Additional Medications   Patient reports taking the following new medications None     History of Present Illness       Reason for visit:  Weight loss,  sleep aids    She eats 2-3 servings of fruits and vegetables daily.She consumes 0 sweetened beverage(s) daily.She exercises with enough effort to increase her heart rate 10 to 19 minutes per day.  She exercises with enough effort to increase her heart rate 3 or less days per week.   She is taking medications regularly.     Patient states that " "she wants to discuss weight loss options.     Patient is also having ongoing left hip pain x2 months, patient had an injection that did not help and is having an injection in the SI joint tomorrow 8/14/24-pain is constantly 7/10.    Mood  Feels she is 84 years old, \"what's the purpose now?\".  Feels she has depression mostly. Not a lot of anxiety at all.   Not able to sleep well. This is not new, has been ongoing for a long time.  Seeing a therapist, once monthly typically for the past couple of years.  Feels lack of motivation in anything really. Last week did absolutely nothing but sit in the chair and read.   Has problems with eating control.   Is always reaching for sugar. Not prone to cooking.  Can't seem to get motivated enough to eat properly and lose some weight.  Has lost so much weight in the past by following a strict diet before but unable to do this now.  Has , fruit, lettuce available but not motivated at all.    Sleep is an issue with getting to sleep most nights. Will keep waking up overnight as well.   Does have CPaP and is using this. Recnetly checked out this year too.   She does take naps during the day as she is so tired.   Keeps caffeine limited to mornings although eating candy/chocolate later in the day.  Walks dog daily, not typically last thing at night.        Objective    /70 (BP Location: Right arm, Patient Position: Sitting, Cuff Size: Adult Large)   Pulse 78   Temp 97.8  F (36.6  C) (Oral)   Resp 18   Ht 1.664 m (5' 5.5\")   Wt 97.5 kg (215 lb)   SpO2 93%   BMI 35.23 kg/m    Body mass index is 35.23 kg/m .    Physical Exam   GENERAL: healthy, alert and no distress  HEAD: Normocephalic, atraumatic.   EYES: Normal conjunctivae, sclera.   RESP: lungs clear to auscultation - no rales, rhonchi or wheezes  CV: regular rate and rhythm, normal S1 S2, no murmur, click, rub or gallop.   MSK: no gross musculoskeletal defects noted.  SKIN: no suspicious lesions or rashes.  NEURO: " "CNII-XII grossly intact. No focal deficits.  PSYCH: Groomed, dressed appropriately for weather. Logical, linear thought process. Passive thoughts of \"what's the point?\" but no active SI. Mood described as unmotivated, depressed with normal affect.     Signed Electronically by: Duy Leyva MD    "

## 2024-08-13 NOTE — PROGRESS NOTES
"[facility] - Procedure Note    Date of Service: 8/14/2024  Procedure performed: LEFT sacroiliac joint injection  Diagnosis: Sacroiliac joint pain  : Domonique Renteria MD   Anesthesia: none    Indications: Ginger Marshall is a 84 year old female who is seen at the request of Kayla Mathur CNP for a sacroiliac joint injection. The patient describes low back and buttock pain. Exam shows full strength and sensation in both lower extremities, tenderness of the sacroiliac (SI) joint, and positive FABERE on the LEFT. The patient reports minimal improvement with conservative treatment, including previous injections, PT and medications.      XRAY PELVIS & LEFT HIP was done on 7/1/2024 and showed:                                                             IMPRESSION:  1. Postoperative and degenerative changes in the spine.  2. Osteoarthrosis of the SI joints.  3. Right total hip arthroplasty partially included in the  field-of-view.  4. Moderate osteoarthrosis of the left hip.  5. Deformity in the pubic rami from old healed fractures.   6. There is no evidence of acute fracture and there is no evidence of  osteonecrosis.    Allergies:      Allergies   Allergen Reactions    Abatacept Other (See Comments)     Severe headaches  Migraine    Celebrex [Roche-2 Inhibitors (Sulfonamide)]      Ineffective    Celecoxib Unknown and Nausea    Levaquin [Levofloxacin] Fatigue     Severe body pain    Orencia [Abatacept]      Headache    Septra [Bactrim]     Sulfa Antibiotics Nausea and Vomiting     \"deathly ill\"  Allergic to everything with Sulfa in it.    Sulfamethoxazole-Trimethoprim Nausea and Nausea and Vomiting    Tramadol Other (See Comments)     Headache  Migraine headache    Valdecoxib Unknown and Nausea     Made me \"very very ill\", might of been \"cramping\"    Adhesive Tape Rash     Plastic tape  Plastic tapes    Antihistamines, Chlorpheniramine-Type  [Antihistamines, Chlorpheniramine-Type] Anxiety " and Other (See Comments)        Vitals:  /68 (BP Location: Left arm, Patient Position: Chair, Cuff Size: Adult Large)   Pulse 73   SpO2 94%     Options/alternatives, benefits and risks were discussed with the patient including bleeding, infection, tissue trauma, exposure to radiation, reaction to medications including seizure, nerve injury, weakness, and numbness.  Questions were answered to her satisfaction and she agrees to proceed. Voluntary informed consent was obtained and signed.     Procedure:  After getting informed consent, patient was brought into the procedure suite and was placed in a prone position on the procedure table.   A Pause for the Cause was performed.  Patient was prepped and draped in sterile fashion.     After identifying the left SI joint, the C-arm was rotated to a obliquely to obtain the best view of the inferior angle of the joint.  A total of 2 ml of Lidocaine 1%  was used to anesthetize the skin at a skin entry site coaxial with the fluoroscopy beam at this location.  A 22 gauge 3.5 inch needle was advanced under intermittent fluoroscopy until it was felt to enter the SI joint.    A total of 1 ml of Omnipaque-300 was injected, confirming appropriate position, with spread into the intraarticular space, with no intravascular uptake note and 0 ml's was wasted. Location was verified in lateral view.    2 ml of 0.5% bupivacaine with 40 mg of kenalog was injected.  The needle was removed. Hemostasis was achieved, the area was cleaned, and bandaids were placed when appropriate.  The patient tolerated the procedure well, and was taken to the recovery room.    Images were saved to PACS.    Pre-procedure pain score: 5/10  Post-procedure pain score: 2/10  Following today's procedure, the patient was advised to contact the Pain Management Center for any of the following:   Fever, chills, or night sweats   New onset of pain, numbness, or weakness   Any questions/concerns regarding the  procedure    -f/u with the referring provider      JAJA HOPE MD   Pain Management

## 2024-08-14 ENCOUNTER — RADIOLOGY INJECTION OFFICE VISIT (OUTPATIENT)
Dept: PALLIATIVE MEDICINE | Facility: CLINIC | Age: 84
End: 2024-08-14
Attending: NURSE PRACTITIONER
Payer: COMMERCIAL

## 2024-08-14 VITALS — HEART RATE: 73 BPM | DIASTOLIC BLOOD PRESSURE: 68 MMHG | SYSTOLIC BLOOD PRESSURE: 139 MMHG | OXYGEN SATURATION: 94 %

## 2024-08-14 DIAGNOSIS — M46.1 SACROILIITIS (H): Primary | ICD-10-CM

## 2024-08-14 DIAGNOSIS — M53.3 SACROILIAC JOINT PAIN: ICD-10-CM

## 2024-08-14 PROCEDURE — 27096 INJECT SACROILIAC JOINT: CPT | Mod: LT | Performed by: ANESTHESIOLOGY

## 2024-08-14 RX ORDER — TRIAMCINOLONE ACETONIDE 40 MG/ML
40 INJECTION, SUSPENSION INTRA-ARTICULAR; INTRAMUSCULAR ONCE
Status: COMPLETED | OUTPATIENT
Start: 2024-08-14 | End: 2024-08-14

## 2024-08-14 RX ADMIN — TRIAMCINOLONE ACETONIDE 40 MG: 40 INJECTION, SUSPENSION INTRA-ARTICULAR; INTRAMUSCULAR at 11:20

## 2024-08-14 NOTE — NURSING NOTE
Discharge Information    IV Discontiued Time:  NA    Amount of Fluid Infused:  NA    Discharge Criteria = When patient returns to baseline or as per MD order    Consciousness:  Pt is fully awake    Circulation:  BP +/- 20% of pre-procedure level    Respiration:  Patient is able to breathe deeply    O2 Sat:  Patient is able to maintain O2 Sat >92% on room air    Activity:  Moves 4 extremities on command    Ambulation:  Patient is able to stand and walk or stand and pivot into wheelchair    Dressing:  Clean/dry or No Dressing    Notes:   Discharge instructions and AVS given to patient    Patient meets criteria for discharge?  YES    Admitted to PCU?  No    Responsible adult present to accompany patient home?  Yes    Signature/Title:    Lolis Mcclelland RN  RN Care Coordinator  [facility] [place]

## 2024-08-14 NOTE — PATIENT INSTRUCTIONS
[facility] Procedure Discharge Instructions    Today you saw:   Dr. Domonique Renteria     Your procedure: Sacro-iliac joint injection     Medications used:  Lidocaine (anesthetic)  Bupivacaine (anesthetic)    Kenalog (steroid)  Omnipaque (contrast) Saline       Be cautious when walking as numbness and/or weakness in the legs may occur up to 6-8 hours after the procedure due to effect of the local anesthetic  Do not drive for 6 hours. The effect of the local anesthetic could slow your reflexes.   Avoid strenuous activity for the first 24 hours. You may resume your regular activities after that.   You may shower, however avoid swimming, tub baths or hot tubs for 24 hours following your procedure  You may have a mild to moderate increase in pain for several days following the injection.    You may use ice packs for 10-15 minutes, 3 to 4 times a day at the injection site for comfort  Do not use heat to painful areas for 6 to 8 hours. This will give the local anesthetic time to wear off and prevent you from accidentally burning your skin.  Unless you have been directed to avoid the use of anti-inflammatory medications (NSAIDS-ibuprofen, Aleve, Motrin), you may use these medications or Tylenol for pain control if needed.   Possible side effects of steroids that you may experience include flushing, elevated blood pressure, increased appetite, mild headaches and restlessness.  All of these symptoms will get better with time.  It may take up to 14 days for the steroid medication to start working although you may feel the effect as early as a few days after the procedure.   Follow up with Kayla CHAMPION CNP in 3-4 weeks    If you experience any of the following, call the pain center line during work hours at [phone] or on-call physician after hours at [phone]:  -Fever over 100 degree F  -Swelling, bleeding, redness, drainage, warmth at the injection site  -Progressive weakness or numbness in  your legs  -Unusual new onset of pain that is not improving

## 2024-08-14 NOTE — NURSING NOTE
Pre-procedure Intake    If YES to any questions or NO to having a   Please complete laminated checklist and leave on the computer keyboard for Provider, verbally inform provider if able.    For SCS Trial, RFA's or any sedation procedure: Have you been fasting? NA  If yes, for how long?    NA     For any sedation procedure:  Do you take any GLP-1 Antagonist? NO   If yes, when did you take your last dose? N/A    Are you taking any any blood thinners such as Coumadin, Warfarin, Jantoven, Pradaxa Xarelto, Eliquis, Edoxaban, Enoxaparin, Lovenox, Heparin, Arixtra, Fondaparinux, or Fragmin? OR Antiplatelet medication such as Plavix, Brilinta, or Effient? N/A  If yes, when did you take your last dose?   NA     Do you take aspirin?   NO  If cervical procedure, have you held aspirin for 6 days?   NA    Do you have any allergies to contrast dye, iodine, steroid and/or numbing medications?  NO     Are you currently taking antibiotics or have an active infection?  NO     Have you had a fever/elevated temperature within the past week? NO     Are you currently taking oral steroids? NO     Do you have a ? No      Are you pregnant or breastfeeding? NO    Have you received any vaccines in the past 2 weeks? NO     Notify provider and RNs if systolic BP >170, diastolic BP >100, P >100 or O2 sats < 90%

## 2024-08-19 ENCOUNTER — VIRTUAL VISIT (OUTPATIENT)
Dept: PSYCHOLOGY | Facility: CLINIC | Age: 84
End: 2024-08-19
Payer: COMMERCIAL

## 2024-08-19 DIAGNOSIS — F33.1 MODERATE EPISODE OF RECURRENT MAJOR DEPRESSIVE DISORDER (H): Primary | ICD-10-CM

## 2024-08-19 PROCEDURE — 90837 PSYTX W PT 60 MINUTES: CPT | Mod: 95

## 2024-08-19 ASSESSMENT — PATIENT HEALTH QUESTIONNAIRE - PHQ9
SUM OF ALL RESPONSES TO PHQ QUESTIONS 1-9: 11
SUM OF ALL RESPONSES TO PHQ QUESTIONS 1-9: 11
10. IF YOU CHECKED OFF ANY PROBLEMS, HOW DIFFICULT HAVE THESE PROBLEMS MADE IT FOR YOU TO DO YOUR WORK, TAKE CARE OF THINGS AT HOME, OR GET ALONG WITH OTHER PEOPLE: VERY DIFFICULT

## 2024-08-19 NOTE — PROGRESS NOTES
[facility]                                     Progress Note    Patient Name: Ginger Marshall  Date: 8/19/24         Service Type: Individual      Session Start Time: 10:00 am  Session End Time: 10:53 am     Session Length: 53 min    Session #: 18    Answers submitted by the patient for this visit:    Attendees: Client attended alone    Service Modality:  Video Visit:      Provider verified identity through the following two step process.  Patient provided:  Patient is known previously to provider    Telemedicine Visit: The patient's condition can be safely assessed and treated via synchronous audio and visual telemedicine encounter.      Reason for Telemedicine Visit: Patient convenience (e.g. access to timely appointments / distance to available provider)    Originating Site (Patient Location): Patient's home    Distant Site (Provider Location): Provider Remote Setting- Home Office    Consent:  The patient/guardian has verbally consented to: the potential risks and benefits of telemedicine (video visit) versus in person care; bill my insurance or make self-payment for services provided; and responsibility for payment of non-covered services.     Patient would like the video invitation sent by:  My Chart    Mode of Communication:  [facility]    Distant Location (Provider):  Off-site    As the provider I attest to compliance with applicable laws and regulations related to telemedicine.    DATA  Interactive Complexity: No     Crisis: No    Extended Session (53+ minutes): PROLONGED SERVICE IN THE OUTPATIENT SETTING REQUIRING DIRECT (FACE-TO-FACE) PATIENT CONTACT BEYOND THE USUAL SERVICE:    - Longer session due to limited access to mental health appointments and necessity to address patient's distress / complexity  Interactive Complexity: No  Crisis: No      Progress Since Last Session (Related to Symptoms / Goals / Homework):   Symptoms: No change   , low energy    Homework:  Partially completed      Episode of Care Goals: Minimal progress - ACTION (Actively working towards change); Intervened by reinforcing change plan / affirming steps taken     Current / Ongoing Stressors and Concerns:  Seeking support for pain management. Daughter providing in home care. Receiving [facility] for support, impatience with responses. Low quality sleep, even with CPAP (comes unhooked-waking up tired, nap in afternoon. Reduced energy focus on committees,  socializing with various Sikh groups.   Ongoing: Grieving loss of adult son who was blind and lived alone, fell and passed away, second loss of an adult/child. Regrets about parenting.      Treatment Objective(s) Addressed in This Session:    Practice boundaries and radical acceptance of reality regarding sister, sons, reality of accident  Improve quantity and quality of night time sleep / decrease daytime naps   Patient will Increase interest, engagement, and pleasure in doing things  Decrease frequency and intensity of feeling down, depressed, hopeless  Improve quantity and quality of night time sleep / decrease daytime naps  Feel less tired and more energy during the day   Improve diet, appetite, mindful eating, and / or meal planning  Identify negative self-talk and behaviors: challenge core beliefs, myths, and actions  Improve concentration, focus, and mindfulness in daily activities   Feel less fidgety, restless or slow in daily activities / interpersonal interactions.     Intervention:  Supportive therapy: Provided active listening and validation. Reviewed grounding/mindfulness skills  EMDR: Introduction, calm safe state practice    Motivational Interviewing-MAINTAIN  Target Behavior:  radical acceptance of reality, gratitude , jena practices  Proactive communication with providers, [facility], attend appointments    MI Intervention: Expressed Empathy/Understanding, Supported Autonomy, Collaboration, Evocation and Open-ended  questions     Change Talk Expressed by the Patient: Desire to change Ability to change Reasons to change Activation Taking steps    Provider Response to Change Talk: E - Evoked more info from patient about behavior change, A - Affirmed patient's thoughts, decisions, or attempts at behavior change, and R - Reflected patient's change talk    Assessments completed prior to visit:  LAURA  12/5/22  The following assessments were completed by patient for this visit:  PHQ2:       2/27/2023     8:50 AM 2/24/2023     6:48 AM 2/20/2023     5:39 PM 1/15/2023    11:54 AM 11/16/2022     1:33 PM 8/5/2022     8:11 AM 7/6/2022     4:18 PM   PHQ-2 ( 1999 [employer])   Q1: Little interest or pleasure in doing things 0 1 1 1 1 3    Q2: Feeling down, depressed or hopeless 0 1 1 1 1 0    PHQ-2 Score 0 2 2 2 2 3    Q1: Little interest or pleasure in doing things  Several days Several days Several days Several days Nearly every day    Q2: Feeling down, depressed or hopeless  Several days Several days Several days Several days Not at all    PHQ-2 Score  2 2 2 2 3 Incomplete     PHQ9:       3/19/2024    10:05 AM 4/2/2024     9:02 PM 4/21/2024     1:24 PM 6/25/2024     7:12 AM 7/7/2024     7:30 AM 8/12/2024     1:53 PM 8/19/2024     9:41 AM   PHQ-9 SCORE   PHQ-9 Total Score MyChart 8 (Mild depression) 3 (Minimal depression) 5 (Mild depression) 9 (Mild depression) 6 (Mild depression) 10 (Moderate depression) 11 (Moderate depression)   PHQ-9 Total Score 8 3 5 9 6 10 11     Patient Health Questionnaire (Submitted on 9/26/2023)  If you checked off any problems, how difficult have these problems made it for you to do your work, take care of things at home, or get along with other people?: Somewhat difficult  PHQ9 TOTAL SCORE: 4  GAD2:       11/19/2023    10:24 AM 1/15/2024    10:35 AM 2/16/2024     8:34 AM 3/13/2024     6:28 PM 4/21/2024     1:25 PM 7/7/2024     7:31 AM 8/19/2024     9:42 AM   SPRING-2   Feeling nervous, anxious, or on edge 0 1 1 1 0 0 0    Not being able to stop or control worrying 0 0 0 0 0 0 0   SPRING-2 Total Score 0 1 1 1 0 0 0     GAD7:       7/6/2022     4:18 PM 8/5/2022     8:11 AM 11/16/2022     1:33 PM 6/5/2023     2:31 PM 2/28/2024    10:14 AM 4/2/2024     9:05 PM 5/31/2024     9:14 AM   SPRING-7 SCORE   Total Score 2 (minimal anxiety) 5 (mild anxiety) 2 (minimal anxiety)  5 (mild anxiety) 1 (minimal anxiety) 1 (minimal anxiety)   Total Score 2 5 2 3 5 1 1     CAGE-AID:       12/5/2022     9:28 AM   CAGE-AID Total Score   Total Score 0   Total Score MyChart 0 (A total score of 2 or greater is considered clinically significant)     PROMIS 10-Global Health (only subscores and total score):       3/25/2022     7:34 AM 12/5/2022     9:28 AM 3/12/2023    10:38 AM 8/23/2023     8:34 AM 12/14/2023     6:28 AM 3/13/2024     6:31 PM 7/7/2024     7:34 AM   PROMIS-10 Scores Only   Global Mental Health Score 12 8    8 8    8 12 13 11 13   Global Physical Health Score 12 13    13 11    11 12 12 11 12   PROMIS TOTAL - SUBSCORES 24 21    21 19    19 24 25 22 25     [place] Suicide Severity Rating Scale (Lifetime/Recent)      12/5/2022    11:00 AM   [place] Suicide Severity Rating (Lifetime/Recent)   Q1 Wished to be Dead (Past Month) no   Q2 Suicidal Thoughts (Past Month) no   Q3 Suicidal Thought Method no   Q4 Suicidal Intent without Specific Plan no   Q5 Suicide Intent with Specific Plan no   Q6 Suicide Behavior (Lifetime) no   Level of Risk per Screen low risk         ASSESSMENT: Current Emotional / Mental Status (status of significant symptoms):   Risk status (Self / Other harm or suicidal ideation)   Patient denies current fears or concerns for personal safety.   Patient denies current or recent suicidal ideation or behaviors.   Patient denies current or recent homicidal ideation or behaviors.   Patient denies current or recent self injurious behavior or ideation.   Patient denies other safety concerns.   Patient reports there has been no change in risk  factors since their last session.     Patient reports there has been no change in protective factors since their last session.     Recommended that patient call 911 or go to the local ED should there be a change in any of these risk factors.     Appearance:   Appropriate    Eye Contact:   Good    Psychomotor Behavior: Normal    Attitude:   Pleasant Attentive   Orientation:   All   Speech    Rate / Production: Normal     Volume:  Normal    Mood:    Normal   Affect:    Appropriate    Thought Content:  Clear    Thought Form:  Coherent  Logical    Insight:    Good      Medication Review:   No changes to current psychiatric medication(s)     Medication Compliance:   Yes     Changes in Health Issues:   None reported     Chemical Use Review:   Substance Use: Chemical use reviewed, no active concerns identified      Tobacco Use: No current tobacco use.      Diagnosis:  1. Moderate episode of recurrent major depressive disorder (H)      Collateral Reports Completed:   Not Applicable    PLAN: (Patient Tasks / Therapist Tasks / Other)   Embrace radical acceptance and boundaries. Use proactive communication with providers, family, try grounding /mindfulness, loving kindness toward self    Rocío Arenas [facility] 8/19/2024                                                                                                  Individual Treatment Plan    Patient's Name: Ginger Marshall  YOB: 1940    Date of Creation: 2/8/23  Date Treatment Plan Last Reviewed/Revised:   8/19/24    DSM5 Diagnoses: 296.31 (F33.0) Major Depressive Disorder, Recurrent Episode, Mild With anxious distress  Psychosocial / Contextual Factors: aging, losses, generational trauma  PROMIS (reviewed every 90 days):   21 12/5/22    Referral / Collaboration:  Referral to another professional/service is not indicated at this time..    Anticipated number of session for this episode of care: 6-9 sessions  Anticipation frequency of session: Every other  week  Anticipated Duration of each session: 53 or more minutes  Treatment plan will be reviewed in 90 days or when goals have been changed.     MeasurableTreatment Goal(s) related to diagnosis / functional impairment(s)  Goal 1: Patient will experience an improvement in mood and functioning as evidenced by assessment scores, self report and clinician observation    I will know I've met my goal when things feel better (paraphrase).      Objective #A (Patient Action)    Patient will increase understanding of steps in the grief process   Talk to others about losses  Use prayer practices and jena community to support well being.   Practice boundaries and radical acceptance of reality regarding sister sons, reality of accident   Engage in social activities at [facility]  Status: 7/8/24    Intervention(s)  Therapist will teach CBT, DBT  and support grief processing skills, prayer practices .    Objective #B  Patient will Increase interest, engagement, and pleasure in doing things  Decrease frequency and intensity of feeling down, depressed, hopeless  Improve quantity and quality of night time sleep / decrease daytime naps  Feel less tired and more energy during the day   Improve diet, appetite, mindful eating, and / or meal planning  Identify negative self-talk and behaviors: challenge core beliefs, myths, and actions  Improve concentration, focus, and mindfulness in daily activities   Feel less fidgety, restless or slow in daily activities / interpersonal interactions.  Status: 7/8/2024    Intervention(s)  Therapist will  teach cbt, dbt,MI, mindfulness and behavioral activation and assign homework .      Patient has reviewed and agreed to the above plan.      Rocío Arenas, [facility]  8/19/2024

## 2024-08-21 ENCOUNTER — MYC MEDICAL ADVICE (OUTPATIENT)
Dept: PALLIATIVE MEDICINE | Facility: CLINIC | Age: 84
End: 2024-08-21
Payer: COMMERCIAL

## 2024-08-21 NOTE — TELEPHONE ENCOUNTER
Pt states back is better this is now L hip pain    Has been seen and evaluated by Ortho for this she could choose to present to ortho UC for eval    Alternately if pain is uncontrolled with current plan and pt is unable to manage at home/function then pt encouraged to seek ER or UC eval     Pt agreeable     Lolis AUGUSTINE RN Care Coordinator  [facility]

## 2024-08-21 NOTE — TELEPHONE ENCOUNTER
[facility]    Phone Message    May a detailed message be left on voicemail: yes     Reason for Call: Other: Patient called to schedule, stated appointment 8/26/2024 did not work for her but that 8/27/2024 did. Writer scheduled for 8/27/2024, patient wonders what to do in the meantime stating her pain is worsening and is not controlled.     Action Taken: Message routed to:  Other: [facility]    Travel Screening: Not Applicable     Date of Service:

## 2024-08-26 ENCOUNTER — TRANSFERRED RECORDS (OUTPATIENT)
Dept: MULTI SPECIALTY CLINIC | Facility: CLINIC | Age: 84
End: 2024-08-26

## 2024-08-26 LAB — RETINOPATHY: NORMAL

## 2024-08-27 ENCOUNTER — VIRTUAL VISIT (OUTPATIENT)
Dept: PSYCHOLOGY | Facility: CLINIC | Age: 84
End: 2024-08-27
Payer: COMMERCIAL

## 2024-08-27 ENCOUNTER — THERAPY VISIT (OUTPATIENT)
Dept: PHYSICAL THERAPY | Facility: CLINIC | Age: 84
End: 2024-08-27
Payer: COMMERCIAL

## 2024-08-27 DIAGNOSIS — G89.29 CHRONIC BILATERAL LOW BACK PAIN WITHOUT SCIATICA: Primary | ICD-10-CM

## 2024-08-27 DIAGNOSIS — M54.50 CHRONIC BILATERAL LOW BACK PAIN WITHOUT SCIATICA: Primary | ICD-10-CM

## 2024-08-27 DIAGNOSIS — M54.42 CHRONIC MIDLINE LOW BACK PAIN WITH LEFT-SIDED SCIATICA: ICD-10-CM

## 2024-08-27 DIAGNOSIS — F33.1 MODERATE EPISODE OF RECURRENT MAJOR DEPRESSIVE DISORDER (H): Primary | ICD-10-CM

## 2024-08-27 DIAGNOSIS — G89.29 CHRONIC MIDLINE LOW BACK PAIN WITH LEFT-SIDED SCIATICA: ICD-10-CM

## 2024-08-27 PROCEDURE — 90837 PSYTX W PT 60 MINUTES: CPT | Mod: 95

## 2024-08-27 PROCEDURE — 97110 THERAPEUTIC EXERCISES: CPT | Mod: GP | Performed by: PHYSICAL THERAPIST

## 2024-08-27 PROCEDURE — 97112 NEUROMUSCULAR REEDUCATION: CPT | Mod: GP | Performed by: PHYSICAL THERAPIST

## 2024-08-27 NOTE — PROGRESS NOTES
[facility]                                     Progress Note    Patient Name: Ginger Marshall  Date: 8/27/24         Service Type: Individual      Session Start Time: 3:05 pm  Session End Time: 3:58 pm     Session Length: 53 min    Session #: 19    Answers submitted by the patient for this visit:    Attendees: Client attended alone    Service Modality:  Video Visit:      Provider verified identity through the following two step process.  Patient provided:  Patient is known previously to provider    Telemedicine Visit: The patient's condition can be safely assessed and treated via synchronous audio and visual telemedicine encounter.      Reason for Telemedicine Visit: Patient convenience (e.g. access to timely appointments / distance to available provider)    Originating Site (Patient Location): Patient's home    Distant Site (Provider Location): Provider Remote Setting- Home Office    Consent:  The patient/guardian has verbally consented to: the potential risks and benefits of telemedicine (video visit) versus in person care; bill my insurance or make self-payment for services provided; and responsibility for payment of non-covered services.     Patient would like the video invitation sent by:  My Chart    Mode of Communication:  Video Conference via Amwell    Distant Location (Provider):  Off-site    As the provider I attest to compliance with applicable laws and regulations related to telemedicine.    DATA  Interactive Complexity: No     Crisis: No    Extended Session (53+ minutes): PROLONGED SERVICE IN THE OUTPATIENT SETTING REQUIRING DIRECT (FACE-TO-FACE) PATIENT CONTACT BEYOND THE USUAL SERVICE:    - Longer session due to limited access to mental health appointments and necessity to address patient's distress / complexity  Interactive Complexity: No  Crisis: No      Progress Since Last Session (Related to Symptoms / Goals / Homework):   Symptoms: Improving mood , low energy    Homework:  Partially completed      Episode of Care Goals: Minimal progress - ACTION (Actively working towards change); Intervened by reinforcing change plan / affirming steps taken     Current / Ongoing Stressors and Concerns:  Seeking support for pain management. Daughter providing in home care. Receiving [facility] for support, impatience with responses. Low quality sleep, even with CPAP (comes unhooked-waking up tired, nap in afternoon. Reduced energy focus on committees,  socializing with various Gn[employer] groups.   Ongoing: Grieving loss of adult son who was blind and lived alone, fell and passed away, second loss of an adult/child. Regrets about parenting.      Treatment Objective(s) Addressed in This Session:    Practice boundaries and radical acceptance of reality regarding sister, sons, reality of accident  Improve quantity and quality of night time sleep / decrease daytime naps   Patient will Increase interest, engagement, and pleasure in doing things  Decrease frequency and intensity of feeling down, depressed, hopeless  Improve quantity and quality of night time sleep / decrease daytime naps  Feel less tired and more energy during the day   Improve diet, appetite, mindful eating, and / or meal planning  Identify negative self-talk and behaviors: challenge core beliefs, myths, and actions  Improve concentration, focus, and mindfulness in daily activities   Feel less fidgety, restless or slow in daily activities / interpersonal interactions.     Intervention:  Supportive therapy: Provided active listening and validation. Reviewed grounding/mindfulness skills  EMDR: Reviewed calm safe state practice    Motivational Interviewing-MAINTAIN  Target Behavior:  radical acceptance of reality, gratitude , jena practices  Proactive communication with providers, [facility], attend appointments    MI Intervention: Expressed Empathy/Understanding, Supported Autonomy, Collaboration, Evocation and Open-ended questions      Change Talk Expressed by the Patient: Desire to change Ability to change Reasons to change Activation Taking steps    Provider Response to Change Talk: E - Evoked more info from patient about behavior change, A - Affirmed patient's thoughts, decisions, or attempts at behavior change, and R - Reflected patient's change talk    Assessments completed prior to visit:  LAURA  12/5/22  The following assessments were completed by patient for this visit:  PHQ2:       2/27/2023     8:50 AM 2/24/2023     6:48 AM 2/20/2023     5:39 PM 1/15/2023    11:54 AM 11/16/2022     1:33 PM 8/5/2022     8:11 AM 7/6/2022     4:18 PM   PHQ-2 ( 1999 [employer])   Q1: Little interest or pleasure in doing things 0 1 1 1 1 3    Q2: Feeling down, depressed or hopeless 0 1 1 1 1 0    PHQ-2 Score 0 2 2 2 2 3    Q1: Little interest or pleasure in doing things  Several days Several days Several days Several days Nearly every day    Q2: Feeling down, depressed or hopeless  Several days Several days Several days Several days Not at all    PHQ-2 Score  2 2 2 2 3 Incomplete     PHQ9:       3/19/2024    10:05 AM 4/2/2024     9:02 PM 4/21/2024     1:24 PM 6/25/2024     7:12 AM 7/7/2024     7:30 AM 8/12/2024     1:53 PM 8/19/2024     9:41 AM   PHQ-9 SCORE   PHQ-9 Total Score MyChart 8 (Mild depression) 3 (Minimal depression) 5 (Mild depression) 9 (Mild depression) 6 (Mild depression) 10 (Moderate depression) 11 (Moderate depression)   PHQ-9 Total Score 8 3 5 9 6 10 11     Patient Health Questionnaire (Submitted on 9/26/2023)  If you checked off any problems, how difficult have these problems made it for you to do your work, take care of things at home, or get along with other people?: Somewhat difficult  PHQ9 TOTAL SCORE: 4  GAD2:       11/19/2023    10:24 AM 1/15/2024    10:35 AM 2/16/2024     8:34 AM 3/13/2024     6:28 PM 4/21/2024     1:25 PM 7/7/2024     7:31 AM 8/19/2024     9:42 AM   SPRING-2   Feeling nervous, anxious, or on edge 0 1 1 1 0 0 0   Not being  able to stop or control worrying 0 0 0 0 0 0 0   SPRING-2 Total Score 0 1 1 1 0 0 0     GAD7:       7/6/2022     4:18 PM 8/5/2022     8:11 AM 11/16/2022     1:33 PM 6/5/2023     2:31 PM 2/28/2024    10:14 AM 4/2/2024     9:05 PM 5/31/2024     9:14 AM   SPRING-7 SCORE   Total Score 2 (minimal anxiety) 5 (mild anxiety) 2 (minimal anxiety)  5 (mild anxiety) 1 (minimal anxiety) 1 (minimal anxiety)   Total Score 2 5 2 3 5 1 1     CAGE-AID:       12/5/2022     9:28 AM   CAGE-AID Total Score   Total Score 0   Total Score MyChart 0 (A total score of 2 or greater is considered clinically significant)     PROMIS 10-Global Health (only subscores and total score):       3/25/2022     7:34 AM 12/5/2022     9:28 AM 3/12/2023    10:38 AM 8/23/2023     8:34 AM 12/14/2023     6:28 AM 3/13/2024     6:31 PM 7/7/2024     7:34 AM   PROMIS-10 Scores Only   Global Mental Health Score 12 8    8 8    8 12 13 11 13   Global Physical Health Score 12 13    13 11    11 12 12 11 12   PROMIS TOTAL - SUBSCORES 24 21    21 19    19 24 25 22 25     [place] Suicide Severity Rating Scale (Lifetime/Recent)      12/5/2022    11:00 AM   [place] Suicide Severity Rating (Lifetime/Recent)   Q1 Wished to be Dead (Past Month) no   Q2 Suicidal Thoughts (Past Month) no   Q3 Suicidal Thought Method no   Q4 Suicidal Intent without Specific Plan no   Q5 Suicide Intent with Specific Plan no   Q6 Suicide Behavior (Lifetime) no   Level of Risk per Screen low risk         ASSESSMENT: Current Emotional / Mental Status (status of significant symptoms):   Risk status (Self / Other harm or suicidal ideation)   Patient denies current fears or concerns for personal safety.   Patient denies current or recent suicidal ideation or behaviors.   Patient denies current or recent homicidal ideation or behaviors.   Patient denies current or recent self injurious behavior or ideation.   Patient denies other safety concerns.   Patient reports there has been no change in risk factors  since their last session.     Patient reports there has been no change in protective factors since their last session.     Recommended that patient call 911 or go to the local ED should there be a change in any of these risk factors.     Appearance:   Appropriate    Eye Contact:   Good    Psychomotor Behavior: Normal    Attitude:   Pleasant Attentive   Orientation:   All   Speech    Rate / Production: Normal     Volume:  Normal    Mood:    Normal depressed   Affect:    Appropriate    Thought Content:  Clear    Thought Form:  Coherent  Logical    Insight:    Good      Medication Review:   No changes to current psychiatric medication(s)     Medication Compliance:   Yes     Changes in Health Issues:   None reported     Chemical Use Review:   Substance Use: Chemical use reviewed, no active concerns identified      Tobacco Use: No current tobacco use.      Diagnosis:  1. Moderate episode of recurrent major depressive disorder (H)        Collateral Reports Completed:   Not Applicable    PLAN: (Patient Tasks / Therapist Tasks / Other)   Embrace radical acceptance and boundaries. Use proactive communication with providers, family, try grounding /mindfulness, loving kindness toward self    Rocío Arenas [facility] 8/27/2024                                                                                                  Individual Treatment Plan    Patient's Name: Ginger Marshall  YOB: 1940    Date of Creation: 2/8/23  Date Treatment Plan Last Reviewed/Revised:   8/19/24    DSM5 Diagnoses: 296.31 (F33.0) Major Depressive Disorder, Recurrent Episode, Mild With anxious distress  Psychosocial / Contextual Factors: aging, losses, generational trauma  PROMIS (reviewed every 90 days):   21 12/5/22    Referral / Collaboration:  Referral to another professional/service is not indicated at this time..    Anticipated number of session for this episode of care: 6-9 sessions  Anticipation frequency of session: Every other  week  Anticipated Duration of each session: 53 or more minutes  Treatment plan will be reviewed in 90 days or when goals have been changed.     MeasurableTreatment Goal(s) related to diagnosis / functional impairment(s)  Goal 1: Patient will experience an improvement in mood and functioning as evidenced by assessment scores, self report and clinician observation    I will know I've met my goal when things feel better (paraphrase).      Objective #A (Patient Action)    Patient will increase understanding of steps in the grief process   Talk to others about losses  Use prayer practices and jena community to support well being.   Practice boundaries and radical acceptance of reality regarding sister sons, reality of accident   Engage in social activities at [facility]  Status: 7/8/24    Intervention(s)  Therapist will teach CBT, DBT  and support grief processing skills, prayer practices .    Objective #B  Patient will Increase interest, engagement, and pleasure in doing things  Decrease frequency and intensity of feeling down, depressed, hopeless  Improve quantity and quality of night time sleep / decrease daytime naps  Feel less tired and more energy during the day   Improve diet, appetite, mindful eating, and / or meal planning  Identify negative self-talk and behaviors: challenge core beliefs, myths, and actions  Improve concentration, focus, and mindfulness in daily activities   Feel less fidgety, restless or slow in daily activities / interpersonal interactions.  Status: 7/8/2024    Intervention(s)  Therapist will  teach cbt, dbt,MI, mindfulness and behavioral activation and assign homework .      Patient has reviewed and agreed to the above plan.      Rocío Arenas, [facility]  8/19/2024

## 2024-08-27 NOTE — TELEPHONE ENCOUNTER
Copied from 03/29/19 duplicate encounter, additional information:    More info: Previously failed remicade (staph infection during therapy, but was immediately after a joint injection) and orencia (migraines).  Sulfa allergy  Thanks,  Nico Byers MD  3/29/2019 3:32 PM    AND    Medication/Dose: Rasuvo 25 mg   ICD code (if different than what is on RX):    Previously Tried and Failed:  Not sure  Rationale:  Product not on formulary     Insurance Name:  [facility] Part D  Insurance ID:  [id number]        Pharmacy Information (if different than what is on RX)  Name:  [facility]            Phone:  [phone]              Thank You,  Ladarius Leos, [facility]  On behalf of [facility]   Patient will be scheduled for 09/23/2024. Instructed patient to hold monjuro

## 2024-09-01 ASSESSMENT — HOOS JR
RISING FROM SITTING: MILD
LYING IN BED (TURNING OVER, MAINTAINING HIP POSITION): SEVERE
SITTING: MODERATE
HOOS JR TOTAL INTERVAL SCORE: 55.99
GOING UP OR DOWN STAIRS: MODERATE
BENDING TO THE FLOOR TO PICK UP OBJECT: MILD
WALKING ON UNEVEN SURFACE: MODERATE

## 2024-09-04 ENCOUNTER — LAB (OUTPATIENT)
Dept: LAB | Facility: CLINIC | Age: 84
End: 2024-09-04
Payer: COMMERCIAL

## 2024-09-04 ENCOUNTER — TELEPHONE (OUTPATIENT)
Dept: ORTHOPEDICS | Facility: CLINIC | Age: 84
End: 2024-09-04

## 2024-09-04 ENCOUNTER — OFFICE VISIT (OUTPATIENT)
Dept: ORTHOPEDICS | Facility: CLINIC | Age: 84
End: 2024-09-04
Payer: COMMERCIAL

## 2024-09-04 VITALS
BODY MASS INDEX: 34.55 KG/M2 | HEIGHT: 66 IN | DIASTOLIC BLOOD PRESSURE: 58 MMHG | WEIGHT: 215 LBS | SYSTOLIC BLOOD PRESSURE: 140 MMHG

## 2024-09-04 DIAGNOSIS — M16.12 OSTEOARTHRITIS OF ONE HIP, LEFT: Primary | ICD-10-CM

## 2024-09-04 DIAGNOSIS — Z79.899 HIGH RISK MEDICATION USE: ICD-10-CM

## 2024-09-04 DIAGNOSIS — M05.79 RHEUMATOID ARTHRITIS INVOLVING MULTIPLE SITES WITH POSITIVE RHEUMATOID FACTOR (H): ICD-10-CM

## 2024-09-04 LAB
ALBUMIN SERPL BCG-MCNC: 4.4 G/DL (ref 3.5–5.2)
ALP SERPL-CCNC: 47 U/L (ref 40–150)
ALT SERPL W P-5'-P-CCNC: 27 U/L (ref 0–50)
AST SERPL W P-5'-P-CCNC: 31 U/L (ref 0–45)
BASOPHILS # BLD AUTO: 0 10E3/UL (ref 0–0.2)
BASOPHILS NFR BLD AUTO: 0 %
BILIRUB DIRECT SERPL-MCNC: <0.2 MG/DL (ref 0–0.3)
BILIRUB SERPL-MCNC: 0.4 MG/DL
CREAT SERPL-MCNC: 0.67 MG/DL (ref 0.51–0.95)
CRP SERPL-MCNC: <3 MG/L
EGFRCR SERPLBLD CKD-EPI 2021: 87 ML/MIN/1.73M2
EOSINOPHIL # BLD AUTO: 0.2 10E3/UL (ref 0–0.7)
EOSINOPHIL NFR BLD AUTO: 5 %
ERYTHROCYTE [DISTWIDTH] IN BLOOD BY AUTOMATED COUNT: 14.6 % (ref 10–15)
ERYTHROCYTE [SEDIMENTATION RATE] IN BLOOD BY WESTERGREN METHOD: 20 MM/HR (ref 0–30)
HCT VFR BLD AUTO: 35.5 % (ref 35–47)
HGB BLD-MCNC: 11.6 G/DL (ref 11.7–15.7)
IMM GRANULOCYTES # BLD: 0 10E3/UL
IMM GRANULOCYTES NFR BLD: 1 %
LYMPHOCYTES # BLD AUTO: 0.9 10E3/UL (ref 0.8–5.3)
LYMPHOCYTES NFR BLD AUTO: 18 %
MCH RBC QN AUTO: 34.7 PG (ref 26.5–33)
MCHC RBC AUTO-ENTMCNC: 32.7 G/DL (ref 31.5–36.5)
MCV RBC AUTO: 106 FL (ref 78–100)
MONOCYTES # BLD AUTO: 0.8 10E3/UL (ref 0–1.3)
MONOCYTES NFR BLD AUTO: 18 %
NEUTROPHILS # BLD AUTO: 2.7 10E3/UL (ref 1.6–8.3)
NEUTROPHILS NFR BLD AUTO: 58 %
PLATELET # BLD AUTO: 280 10E3/UL (ref 150–450)
PROT SERPL-MCNC: 7.5 G/DL (ref 6.4–8.3)
RBC # BLD AUTO: 3.34 10E6/UL (ref 3.8–5.2)
WBC # BLD AUTO: 4.6 10E3/UL (ref 4–11)

## 2024-09-04 PROCEDURE — 82565 ASSAY OF CREATININE: CPT

## 2024-09-04 PROCEDURE — 80076 HEPATIC FUNCTION PANEL: CPT

## 2024-09-04 PROCEDURE — 85025 COMPLETE CBC W/AUTO DIFF WBC: CPT

## 2024-09-04 PROCEDURE — 99214 OFFICE O/P EST MOD 30 MIN: CPT | Mod: GC | Performed by: STUDENT IN AN ORGANIZED HEALTH CARE EDUCATION/TRAINING PROGRAM

## 2024-09-04 PROCEDURE — 86140 C-REACTIVE PROTEIN: CPT

## 2024-09-04 PROCEDURE — 85652 RBC SED RATE AUTOMATED: CPT

## 2024-09-04 PROCEDURE — 36415 COLL VENOUS BLD VENIPUNCTURE: CPT

## 2024-09-04 RX ORDER — TRANEXAMIC ACID 650 MG/1
1950 TABLET ORAL ONCE
OUTPATIENT
Start: 2024-09-04 | End: 2024-09-04

## 2024-09-04 NOTE — PROGRESS NOTES
[facility]  Orthopaedic Surgery Consultation by Eugenio Jeffries M.D.    Ginger Marshall MRN# [id number]   Age: 84 year old YOB: 1940     Requesting physician: Referred Duy Reddy     Background history:  DX:  OSAS  Morbid obesity with a BMI of 35.2  Hyperlipidemia  Rheumatic mitral valve stenosis  Osteoporosis  Lumbar degenerative disc disease  Rheumatoid arthritis on methotrexate and upadacinitinib    TREATMENTS:  2008, rotator cuff repair shoulder  2008, total hip arthroplasty right  2009, total knee arthroplasty  2012, revision total hip arthroplasty right  2017, L3-L4 posterior fusion  2023, ORIF left distal radius fracture           History of Present Illness:   84 year old female who presents our clinic for the evaluation of chronic left hip/groin pain.  This pain has been present for approximately 6 months without antecedent trauma.  It has been progressive since.  Pain appears to radiate down to the knee occasionally.  Patient does not report significant low back pain.  Patient reports that at night she wakes up from cramps in her legs not necessarily from her hip.  She does report initiation stiffness and soreness.  Her activity is greatly limited by her left hip.  She ambulates with the assistance of a walker partially for balance reasons.  She ambulates with a limp.  She has no trouble putting on her shoes as she is wearing slip on this.  She has trouble getting in and out of a car or low seats.  To mitigate her pain she has tried al eft hip injection 7/9/24 provided no significant or long-term relief.  During the anesthetic phase she was 100% pain-free.  She has been icing, taking ibuprofen, physical therapy. Was unable to do PT for hip due to pain. She uses a walker for ambulation.     Social:   Occupation: retired  Living situation: lives alone in apartment   Hobbies / Sports: none    Smoking: No  Alcohol: Yes  Illicit drug use: No         Physical Exam:     EXAMINATION  "pertinent findings:   PSYCH: Pleasant, healthy-appearing, alert, oriented x3, cooperative. Normal mood and affect.  VITAL SIGNS: Blood pressure (!) 140/58, height 1.664 m (5' 5.5\"), weight 97.5 kg (215 lb), not currently breastfeeding.  Reviewed nursing intake notes.   Body mass index is 35.23 kg/m .  RESP: non labored breathing   ABD: benign, soft, non-tender, no acute peritoneal findings  SKIN: grossly normal   LYMPHATIC: grossly normal, no adenopathy, no extremity edema  NEURO: grossly normal , no motor deficits  VASCULAR: satisfactory perfusion of all extremities   MUSCULOSKELETAL:   Alignment: Neutral  Gait: Antalgic over left lower extremity.     L hip: ROM  FF 90  , Extension 0 , IRF 15 , ERF 30 , ABD 30 , ADD 20 .  Rotations are recognizably painful in both flexion and extension.  Less excess is negative.  No tenderness to palpation over greater trochanteric region.     Left LE:   Thigh and leg compartments soft and compressible   +Quad/TA/GSC/FHL/EHL   SILT DP/SP/Nic/Saph/Tib nerve distributions   Palpable dorsalis pedis pulse              Data:   All laboratory data reviewed  All imaging studies reviewed by me personally.    XR pelvis/hip left 7/1/2024:  My interpretation: Status post right total hip arthroplasty.  Adequate sizing, orientation and fixation of components.  Moderate to severe osteoarthritic changes of left femoral acetabular joint with joint space narrowing, presence of large marginal osteophytes, sclerosis and subchondral cysts.         Assessment and Plan:   Assessment:  85-year-old female presenting with chronic left hip/groin pain due to osteoarthritic changes and in setting of rheumatoid arthritis which are insufficiently responding to nonsurgical treatment options.     Plan:  We had a long discussion with the patient regarding etiology and ongoing management options.  Reviewed surgical and nonsurgical treatments.  The non-operative management options consist of activity modification, " pain medication, PT and injection therapy.  At this point it appears the patient has exhausted all nonsurgical treatment options.  We reviewed total hip replacement in detail including the procedure, the implants, the recovery process, and long-term outcomes.  We reviewed that the risks of the surgery include but are not limited to infection, wound problems, dislocation, persistent pain, leg length discrepancy, loosening, revision surgery.  We also reviewed less common risks such as neurovascular injury fracture, and other implant-related issues.  We reviewed other medical complications such as a blood clot which we would be mitigating by oral anticoagulants.  We discussed that the vast majority of cases have a highly successful outcome.  However there is a small subset of patients that do experience complications or problems following the hip replacement.  Based on a discussion of the risks and benefits, we believe that the benefits far outweigh the risks at this point and the patient would like to proceed with left hybrid total hip arthroplasty surgery.  We will work on scheduling surgery at a time that works well for them in the next few months.  Patient will contact us if they have any questions or concerns leading up to surgery. Before surgery the patient will attend a joint replacement class and will be seen by the PCP/anesthesiologist and dentist.    Before surgery we will obtain recommendations from her rheumatology provider in regards to cessation of the upadacinitinib in the perioperative phase.    For additional information and frequently asked questions regarding joint replacements, scan the QR code image below on your phone camera or go to: [email or web address]            Thank you for your referral.    Eugenio Jeffries MD, PhD     Adult Reconstruction  [facility] Department of Orthopaedic Surgery    This note was created  using dictation software and may contain errors.  Please contact the creator for any clarifications that are needed.    DATA for DOCUMENTATION:         Past Medical History:     Patient Active Problem List   Diagnosis    Rheumatoid arthritis involving right hand with positive rheumatoid factor (H)    History of colonic polyps    Multinodular goiter    Pulmonary nodule    PSEUDOPHAKIA OU    PVD (POSTERIOR VITREOUS DETACHMENT) OU    PXF (PSEUDOEXFOLIATION OF LENS CAPSULE) OD    GERD (gastroesophageal reflux disease)    Hyperlipidemia LDL goal <100    Neuropathy    Sleep apnea    Anemia of chronic disease    Osteoporosis    Cornea guttata, ou    Conjunctival concretions    Stenosis, spinal, lumbar    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    MGD (meibomian gland dysfunction)    Prediabetes    Allergic state, subsequent encounter    Anxiety    DDD (degenerative disc disease), lumbar    Chronic right shoulder pain    Moderate episode of recurrent major depressive disorder (H)    Rheumatic mitral stenosis    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    History of bisphosphonate therapy    Morbid obesity (H)    Immunocompromised (H24)    Thoracic spine pain    Herpes simplex vulvovaginitis     Past Medical History:   Diagnosis Date    Acute posthemorrhagic anemia 10/13/2012    Closed fracture of multiple ribs of left side, initial encounter 11/25/2019    Closed fracture of olecranon process of right ulna with routine healing 02/11/2023    Closed fracture of shaft of left ulna, unspecified fracture morphology, initial encounter 02/11/2023    COPD (chronic obstructive pulmonary disease) (H) 1980's    no longer occurring    Depressive disorder     Ex-smoker 01/1999    Gastroenteritis 03/21/2020    Gastroenteritis with norovirus    Herniated nucleus pulposus, L3-4 03/01/2017    Hip joint replacement by other means 07/10/2008    History of blood transfusion     History of total hip  replacement 10/11/2012    History of total knee replacement 2009    Menopause 1989    late 40's    Migraine 2014    resolved    Multinodular goiter     Other chronic pain     joints    Other closed intra-articular fracture of distal end of left radius, initial encounter 2023    Pelvic fracture (H) 2014    Rheumatic mitral stenosis     Rheumatoid arteritis (H)     S/P lumbar fusion 2017    Sleep apnea     Vitamin B12 deficiency        Also see scanned health assessment forms.       Past Surgical History:     Past Surgical History:   Procedure Laterality Date    ABDOMEN SURGERY      c sections    ARTHROSCOPY KNEE Left     ARTHROSCOPY KNEE RT/LT  2006    left    BACK SURGERY  2013    disc    BIOPSY BREAST      BREAST SURGERY      lumpectomy 's    BREAST SURGERY      lumpectomy    CATARACT EXTRACTION Bilateral     CATARACT IOL, RT/LT Bilateral 2010 aproximately     SECTION  1966     SECTION  1972    COLONOSCOPY  2009,    COLONOSCOPY      FINGER SURGERY Right 2019    Procedure: DISTAL ULNA RESECTION, RIGHT MIDDLE SILASTIC METACARPALPHALANGEAL EXCHANGE AND RIGHT ELBOW NODULE EXCISION.;  Surgeon: Hilario Redmond MD;  Location: [facility];  Service: Orthopedics    FOOT SURGERY Left     GYN SURGERY  66,72    HAND SURGERY Right     HAND SURGERY Right      ESOPH/GAS REFLUX TEST W NASAL IMPED >1 HR  2012    Procedure:ESOPHAGEAL IMPEDENCE FUNCTION TEST WITH 24 HOUR PH GREATER THAN 1 HOUR; Surgeon:KAYKAY JULIO; Location:[facility]    IR LUMBAR EPIDURAL STEROID INJECTION      IR TRANSLAMINAR EPIDURAL LUMBAR INJ INCL IMAGING  2012    [facility] L5-S1 at [facility]    LUMBAR FUSION      OPEN REDUCTION INTERNAL FIXATION ELBOW Right 2023    Procedure: OPEN REDUCTION INTERNAL FIXATION, RIGHT OLECRANON FRACTURE;  Surgeon: Pili Brock MD;  Location:  OR    OPEN REDUCTION INTERNAL FIXATION WRIST Left 2023    Procedure: OPEN  REDUCTION INTERNAL FIXATION, LEFT DISTAL RADIUS FRACTURE;  Surgeon: Pili Brock MD;  Location:  OR    OPTICAL TRACKING SYSTEM FUSION POSTERIOR SPINE LUMBAR N/A 2017    Procedure: OPTICAL TRACKING SYSTEM FUSION SPINE POSTERIOR LUMBAR ONE LEVEL;  Surgeon: Anthony Michele MD;  Location:  OR    ORTHOPEDIC SURGERY      left foot surgery    ORTHOPEDIC SURGERY      R MCP surgery    ORTHOPEDIC SURGERY      right knee total replacement    TOTAL HIP ARTHROPLASTY Right     TOTAL KNEE ARTHROPLASTY Left     TOTAL KNEE ARTHROPLASTY Right     ZZC ANESTH,TOTAL HIP ARTHROPLASTY      Rt hip    ZZC HAND/FINGER SURGERY UNLISTED  2005    right hand    ZZC TOTAL KNEE ARTHROPLASTY  2006    left            Social History:     Social History     Socioeconomic History    Marital status: Single     Spouse name: Not on file    Number of children: 3    Years of education: Not on file    Highest education level: Not on file   Occupational History    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   Tobacco Use    Smoking status: Former     Current packs/day: 0.00     Average packs/day: 1 pack/day for 41.0 years (41.0 ttl pk-yrs)     Types: Cigarettes     Start date: 1958     Quit date: 1999     Years since quittin.6    Smokeless tobacco: Never    Tobacco comments:     former smoker   Vaping Use    Vaping status: Never Used   Substance and Sexual Activity    Alcohol use: Yes     Comment: Occasionally,  usually wine    Drug use: No    Sexual activity: Not Currently     Partners: Male     Comment: To old for all that   Other Topics Concern    Parent/sibling w/ CABG, MI or angioplasty before 65F 55M? No     Service Not Asked    Blood Transfusions Not Asked    Caffeine Concern Yes     Comment: She has 8 cups of coffee in the AM and is done by 8-8:30 AM.    Occupational Exposure Not Asked    Hobby Hazards Not Asked    Sleep Concern Not Asked    Stress Concern Not Asked     Weight Concern Not Asked    Special Diet Not Asked    Back Care Not Asked    Exercise Yes     Comment: Sometimes.  Gordo Redd comes 3 times a week to her building.    Bike Helmet Not Asked    Seat Belt Not Asked    Self-Exams Not Asked   Social History Narrative    She lives in a a senior living apartment building.    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    4 children, 3  and 1 living daughter     Social Determinants of Health     Financial Resource Strain: Low Risk  (2024)    Financial Resource Strain     Within the past 12 months, have you or your family members you live with been unable to get utilities (heat, electricity) when it was really needed?: No   Food Insecurity: Low Risk  (2024)    Food Insecurity     Within the past 12 months, did you worry that your food would run out before you got money to buy more?: No     Within the past 12 months, did the food you bought just not last and you didn t have money to get more?: No   Recent Concern: Food Insecurity - High Risk (2023)    Food Insecurity     Within the past 12 months, did you worry that your food would run out before you got money to buy more?: Yes     Within the past 12 months, did the food you bought just not last and you didn t have money to get more?: No   Transportation Needs: Low Risk  (2024)    Transportation Needs     Within the past 12 months, has lack of transportation kept you from medical appointments, getting your medicines, non-medical meetings or appointments, work, or from getting things that you need?: No   Physical Activity: Inactive (2024)    Exercise Vital Sign     Days of Exercise per Week: 0 days     Minutes of Exercise per Session: 0 min   Stress: Stress Concern Present (2024)    [employer] - Occupational Stress Questionnaire     Feeling of Stress : To some extent   Social Connections: Unknown (2024)    Social Connection and Isolation  Panel [NHANES]     Frequency of Communication with Friends and Family: Not on file     Frequency of Social Gatherings with Friends and Family: Once a week     Attends Anabaptism Services: Not on file     Active Member of Clubs or Organizations: Not on file     Attends Club or Organization Meetings: Not on file     Marital Status: Not on file   Interpersonal Safety: Low Risk  (2024)    Interpersonal Safety     Do you feel physically and emotionally safe where you currently live?: Yes     Within the past 12 months, have you been hit, slapped, kicked or otherwise physically hurt by someone?: No     Within the past 12 months, have you been humiliated or emotionally abused in other ways by your partner or ex-partner?: No   Housing Stability: Low Risk  (2024)    Housing Stability     Do you have housing? : Yes     Are you worried about losing your housing?: No            Family History:       Family History   Problem Relation Age of Onset    Arthritis Mother     Alzheimer Disease Mother     Hyperlipidemia Mother     Osteoporosis Mother     Heart Disease Father         MI ( from this)    Alcohol/Drug Father     Arthritis Sister     Hypertension Sister     Other Cancer Sister         Luekemia    Neurologic Disorder Sister         Schizophrenic    Hypertension Sister             Hyperlipidemia Sister     Mental Illness Sister         Bipolar    Glaucoma Daughter     Diabetes Son         type 1,     Diabetes Son         ,  cancer    Esophageal Cancer Son         Drank and smoked    Substance Abuse Son     Diabetes Other         type 2    Hyperlipidemia Other             Medications:     Current Outpatient Medications   Medication Sig Dispense Refill    acetaminophen (TYLENOL) 325 MG tablet Take 2 tablets (650 mg) by mouth every 6 hours as needed for mild pain Max tylenol 3000 mg in 24 hours      albuterol (PROAIR HFA/PROVENTIL HFA/VENTOLIN HFA) 108 (90 Base) MCG/ACT inhaler Inhale 2 puffs  into the lungs every 6 hours as needed for shortness of breath, wheezing or cough      atorvastatin (LIPITOR) 40 MG tablet Take 1 tablet (40 mg) by mouth daily 90 tablet 3    buPROPion (WELLBUTRIN XL) 150 MG 24 hr tablet Take 1 tablet (150 mg) by mouth every morning 30 tablet 0    CALCIUM CITRATE PO Take 300 mg by mouth daily Take with meal that contains least amount of calcium      carboxymethylcellulose PF (REFRESH PLUS) 0.5 % ophthalmic solution Place 1 drop into both eyes 2 times daily as needed for dry eyes      Cholecalciferol (VITAMIN D-3 PO) Take 2,000 Units by mouth daily      Ferrous Gluconate 324 (37.5 Fe) MG TABS Take 1 tablet by mouth daily Started 6/2023      hydrocortisone 2.5 % cream Apply topically 2 times daily For up to two weeks 20 g 0    ipratropium (ATROVENT) 0.06 % nasal spray Spray 2 sprays into both nostrils 4 times daily as needed for rhinitis (nasal drainage) 15 mL 11    leucovorin (WELLCOVORIN) 5 MG tablet Take 1 tablet (5 mg) by mouth every 7 days . Take 24 hours after taking Methotrexate each week. 13 tablet 2    loratadine (CLARITIN) 10 MG tablet Take 1 tablet (10 mg) by mouth 2 times daily      Methotrexate, PF, (RASUVO) 25 MG/0.5ML autoinjector Inject 0.5 mLs (25 mg) Subcutaneous every 7 days . Hold for signs of infection, and seek medical attention. Take every Thursday. 2 mL 6    naproxen (NAPROSYN) 500 MG tablet Take 1 tablet (500 mg) by mouth 2 times daily as needed for moderate pain (take with food) 30 tablet 0    olopatadine (PATADAY) 0.2 % ophthalmic solution Place 1 drop into both eyes daily as needed Uses mostly in Spring      pantoprazole (PROTONIX) 40 MG EC tablet TAKE ONE TABLET BY MOUTH EVERY MORNING BEFORE BREAKFAST 90 tablet 0    pregabalin (LYRICA) 50 MG capsule Take 1 capsule (50 mg) by mouth 3 times daily 270 capsule 1    sertraline (ZOLOFT) 100 MG tablet Take 1 tablet (100 mg) by mouth daily 90 tablet 1    triamcinolone (KENALOG) 0.1 % external ointment Apply  topically 2 times daily (Patient not taking: Reported on 8/13/2024)      UNABLE TO FIND MEDICATION NAME: Distilled water for CPAP      upadacitinib ER (RINVOQ) 15 MG tablet Take 1 tablet (15 mg) by mouth daily . Hold for any infection and seek medical attention. 30 tablet 6    valACYclovir (VALTREX) 500 MG tablet TAKE ONE TABLET BY MOUTH TWICE A  tablet 1    vitamin C (ASCORBIC ACID) 1000 MG TABS Take 1 tablet (1,000 mg) by mouth daily      zoledronic Acid (RECLAST) 5 MG/100ML SOLN infusion Inject 5 mg into the vein once Every year (next dose March 2023)       No current facility-administered medications for this visit.              Review of Systems:   A comprehensive 10 point review of systems (constitutional, ENT, cardiac, peripheral vascular, lymphatic, respiratory, GI, , Musculoskeletal, skin, Neurological) was performed and found to be negative except as described in this note.     See intake form completed by patient

## 2024-09-04 NOTE — LETTER
9/4/2024      Ginger Marshall  [address]  [facility] [address]      Dear Colleague,    Thank you for referring your patient, Ginger Marshall, to the [facility] [place]. Please see a copy of my visit note below.        [facility]  Orthopaedic Surgery Consultation by Eugenio Jeffries M.D.    Ginger Marshall MRN# [id number]   Age: 84 year old YOB: 1940     Requesting physician: Referred Self  Duy Leyva     Background history:  DX:  OSAS  Morbid obesity with a BMI of 35.2  Hyperlipidemia  Rheumatic mitral valve stenosis  Osteoporosis  Lumbar degenerative disc disease  Rheumatoid arthritis on methotrexate and upadacinitinib    TREATMENTS:  2008, rotator cuff repair shoulder  2008, total hip arthroplasty right  2009, total knee arthroplasty  2012, revision total hip arthroplasty right  2017, L3-L4 posterior fusion  2023, ORIF left distal radius fracture           History of Present Illness:   84 year old female who presents our clinic for the evaluation of chronic left hip/groin pain.  This pain has been present for approximately 6 months without antecedent trauma.  It has been progressive since.  Pain appears to radiate down to the knee occasionally.  Patient does not report significant low back pain.  Patient reports that at night she wakes up from cramps in her legs not necessarily from her hip.  She does report initiation stiffness and soreness.  Her activity is greatly limited by her left hip.  She ambulates with the assistance of a walker partially for balance reasons.  She ambulates with a limp.  She has no trouble putting on her shoes as she is wearing slip on this.  She has trouble getting in and out of a car or low seats.  To mitigate her pain she has tried al eft hip injection 7/9/24 provided no significant or long-term relief.  During the anesthetic phase she was 100% pain-free.  She has been icing, taking ibuprofen, physical therapy. Was unable to do  "PT for hip due to pain. She uses a walker for ambulation.     Social:   Occupation: retired  Living situation: lives alone in apartment   Hobbies / Sports: none    Smoking: No  Alcohol: Yes  Illicit drug use: No         Physical Exam:     EXAMINATION pertinent findings:   PSYCH: Pleasant, healthy-appearing, alert, oriented x3, cooperative. Normal mood and affect.  VITAL SIGNS: Blood pressure (!) 140/58, height 1.664 m (5' 5.5\"), weight 97.5 kg (215 lb), not currently breastfeeding.  Reviewed nursing intake notes.   Body mass index is 35.23 kg/m .  RESP: non labored breathing   ABD: benign, soft, non-tender, no acute peritoneal findings  SKIN: grossly normal   LYMPHATIC: grossly normal, no adenopathy, no extremity edema  NEURO: grossly normal , no motor deficits  VASCULAR: satisfactory perfusion of all extremities   MUSCULOSKELETAL:   Alignment: Neutral  Gait: Antalgic over left lower extremity.     L hip: ROM  FF 90  , Extension 0 , IRF 15 , ERF 30 , ABD 30 , ADD 20 .  Rotations are recognizably painful in both flexion and extension.  Less excess is negative.  No tenderness to palpation over greater trochanteric region.     Left LE:   Thigh and leg compartments soft and compressible   +Quad/TA/GSC/FHL/EHL   SILT DP/SP/Nic/Saph/Tib nerve distributions   Palpable dorsalis pedis pulse              Data:   All laboratory data reviewed  All imaging studies reviewed by me personally.    XR pelvis/hip left 7/1/2024:  My interpretation: Status post right total hip arthroplasty.  Adequate sizing, orientation and fixation of components.  Moderate to severe osteoarthritic changes of left femoral acetabular joint with joint space narrowing, presence of large marginal osteophytes, sclerosis and subchondral cysts.         Assessment and Plan:   Assessment:  85-year-old female presenting with chronic left hip/groin pain due to osteoarthritic changes and in setting of rheumatoid arthritis which are insufficiently responding to " nonsurgical treatment options.     Plan:  We had a long discussion with the patient regarding etiology and ongoing management options.  Reviewed surgical and nonsurgical treatments.  The non-operative management options consist of activity modification, pain medication, PT and injection therapy.  At this point it appears the patient has exhausted all nonsurgical treatment options.  We reviewed total hip replacement in detail including the procedure, the implants, the recovery process, and long-term outcomes.  We reviewed that the risks of the surgery include but are not limited to infection, wound problems, dislocation, persistent pain, leg length discrepancy, loosening, revision surgery.  We also reviewed less common risks such as neurovascular injury fracture, and other implant-related issues.  We reviewed other medical complications such as a blood clot which we would be mitigating by oral anticoagulants.  We discussed that the vast majority of cases have a highly successful outcome.  However there is a small subset of patients that do experience complications or problems following the hip replacement.  Based on a discussion of the risks and benefits, we believe that the benefits far outweigh the risks at this point and the patient would like to proceed with left hybrid total hip arthroplasty surgery.  We will work on scheduling surgery at a time that works well for them in the next few months.  Patient will contact us if they have any questions or concerns leading up to surgery. Before surgery the patient will attend a joint replacement class and will be seen by the PCP/anesthesiologist and dentist.    Before surgery we will obtain recommendations from her rheumatology provider in regards to cessation of the upadacinitinib in the perioperative phase.    For additional information and frequently asked questions regarding joint replacements, scan the QR code image below on your phone camera or go to:  [email or web address]            Thank you for your referral.    Eugenio Jeffries MD, PhD     Adult Reconstruction  [facility] Department of Orthopaedic Surgery    This note was created using dictation software and may contain errors.  Please contact the creator for any clarifications that are needed.    DATA for DOCUMENTATION:         Past Medical History:     Patient Active Problem List   Diagnosis     Rheumatoid arthritis involving right hand with positive rheumatoid factor (H)     History of colonic polyps     Multinodular goiter     Pulmonary nodule     PSEUDOPHAKIA OU     PVD (POSTERIOR VITREOUS DETACHMENT) OU     PXF (PSEUDOEXFOLIATION OF LENS CAPSULE) OD     GERD (gastroesophageal reflux disease)     Hyperlipidemia LDL goal <100     Neuropathy     Sleep apnea     Anemia of chronic disease     Osteoporosis     Cornea guttata, ou     Conjunctival concretions     Stenosis, spinal, lumbar     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     MGD (meibomian gland dysfunction)     Prediabetes     Allergic state, subsequent encounter     Anxiety     DDD (degenerative disc disease), lumbar     Chronic right shoulder pain     Moderate episode of recurrent major depressive disorder (H)     Rheumatic mitral stenosis     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     History of bisphosphonate therapy     Morbid obesity (H)     Immunocompromised (H24)     Thoracic spine pain     Herpes simplex vulvovaginitis     Past Medical History:   Diagnosis Date     Acute posthemorrhagic anemia 10/13/2012     Closed fracture of multiple ribs of left side, initial encounter 11/25/2019     Closed fracture of olecranon process of right ulna with routine healing 02/11/2023     Closed fracture of shaft of left ulna, unspecified fracture morphology, initial encounter 02/11/2023     COPD (chronic obstructive pulmonary disease) (H)      no longer occurring     Depressive disorder      Ex-smoker 1999     Gastroenteritis 2020    Gastroenteritis with norovirus     Herniated nucleus pulposus, L3-4 2017     Hip joint replacement by other means 07/10/2008     History of blood transfusion      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     History of total knee replacement 2009     Menopause 1989    late 40's     Migraine 2014    resolved     Multinodular goiter      Other chronic pain     joints     Other closed intra-articular fracture of distal end of left radius, initial encounter 2023     Pelvic fracture (H) 2014     Rheumatic mitral stenosis      Rheumatoid arteritis (H)      S/P lumbar fusion 2017     Sleep apnea      Vitamin B12 deficiency        Also see scanned health assessment forms.       Past Surgical History:     Past Surgical History:   Procedure Laterality Date     ABDOMEN SURGERY      c sections     ARTHROSCOPY KNEE Left      ARTHROSCOPY KNEE RT/LT  2006    left     BACK SURGERY  2013    disc     BIOPSY BREAST       BREAST SURGERY      lumpectomy 's     BREAST SURGERY      lumpectomy     CATARACT EXTRACTION Bilateral      CATARACT IOL, RT/LT Bilateral 2010 aproximately      SECTION  1966      SECTION  1972     COLONOSCOPY  2009,     COLONOSCOPY       FINGER SURGERY Right 2019    Procedure: DISTAL ULNA RESECTION, RIGHT MIDDLE SILASTIC METACARPALPHALANGEAL EXCHANGE AND RIGHT ELBOW NODULE EXCISION.;  Surgeon: Hilario Redmond MD;  Location: [facility] OR;  Service: Orthopedics     FOOT SURGERY Left      GYN SURGERY  66,72     HAND SURGERY Right      HAND SURGERY Right       ESOPH/GAS REFLUX TEST W NASAL IMPED >1 HR  2012    Procedure:ESOPHAGEAL IMPEDENCE FUNCTION TEST WITH 24 HOUR PH GREATER THAN 1 HOUR; Surgeon:KAYKAY JULIO; Location:[facility]     IR LUMBAR EPIDURAL STEROID INJECTION       IR TRANSLAMINAR EPIDURAL  LUMBAR INJ INCL IMAGING  2012    LESI L5-S1 at [facility]     LUMBAR FUSION       OPEN REDUCTION INTERNAL FIXATION ELBOW Right 2023    Procedure: OPEN REDUCTION INTERNAL FIXATION, RIGHT OLECRANON FRACTURE;  Surgeon: Pili Brock MD;  Location:  OR     OPEN REDUCTION INTERNAL FIXATION WRIST Left 2023    Procedure: OPEN REDUCTION INTERNAL FIXATION, LEFT DISTAL RADIUS FRACTURE;  Surgeon: Pili Brock MD;  Location:  OR     OPTICAL TRACKING SYSTEM FUSION POSTERIOR SPINE LUMBAR N/A 2017    Procedure: OPTICAL TRACKING SYSTEM FUSION SPINE POSTERIOR LUMBAR ONE LEVEL;  Surgeon: Anthony Michele MD;  Location:  OR     ORTHOPEDIC SURGERY      left foot surgery     ORTHOPEDIC SURGERY      R MCP surgery     ORTHOPEDIC SURGERY      right knee total replacement     TOTAL HIP ARTHROPLASTY Right      TOTAL KNEE ARTHROPLASTY Left      TOTAL KNEE ARTHROPLASTY Right      ZZC ANESTH,TOTAL HIP ARTHROPLASTY      Rt hip     ZZC HAND/FINGER SURGERY UNLISTED  2005    right hand     ZZC TOTAL KNEE ARTHROPLASTY  2006    left            Social History:     Social History     Socioeconomic History     Marital status: Single     Spouse name: Not on file     Number of children: 3     Years of education: Not on file     Highest education level: Not on file   Occupational History     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   Tobacco Use     Smoking status: Former     Current packs/day: 0.00     Average packs/day: 1 pack/day for 41.0 years (41.0 ttl pk-yrs)     Types: Cigarettes     Start date: 1958     Quit date: 1999     Years since quittin.6     Smokeless tobacco: Never     Tobacco comments:     former smoker   Vaping Use     Vaping status: Never Used   Substance and Sexual Activity     Alcohol use: Yes     Comment: Occasionally,  usually wine     Drug use: No     Sexual activity: Not Currently     Partners: Male     Comment: To old for all that    Other Topics Concern     Parent/sibling w/ CABG, MI or angioplasty before 65F 55M? No      Service Not Asked     Blood Transfusions Not Asked     Caffeine Concern Yes     Comment: She has 8 cups of coffee in the AM and is done by 8-8:30 AM.     Occupational Exposure Not Asked     Hobby Hazards Not Asked     Sleep Concern Not Asked     Stress Concern Not Asked     Weight Concern Not Asked     Special Diet Not Asked     Back Care Not Asked     Exercise Yes     Comment: Sometimes.  Gordo Redd comes 3 times a week to her building.     Bike Helmet Not Asked     Seat Belt Not Asked     Self-Exams Not Asked   Social History Narrative    She lives in a a senior living apartment building.    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    4 children, 3  and 1 living daughter     Social Determinants of Health     Financial Resource Strain: Low Risk  (2024)    Financial Resource Strain      Within the past 12 months, have you or your family members you live with been unable to get utilities (heat, electricity) when it was really needed?: No   Food Insecurity: Low Risk  (2024)    Food Insecurity      Within the past 12 months, did you worry that your food would run out before you got money to buy more?: No      Within the past 12 months, did the food you bought just not last and you didn t have money to get more?: No   Recent Concern: Food Insecurity - High Risk (2023)    Food Insecurity      Within the past 12 months, did you worry that your food would run out before you got money to buy more?: Yes      Within the past 12 months, did the food you bought just not last and you didn t have money to get more?: No   Transportation Needs: Low Risk  (2024)    Transportation Needs      Within the past 12 months, has lack of transportation kept you from medical appointments, getting your medicines, non-medical meetings or appointments, work, or from getting things that you need?: No    Physical Activity: Inactive (2024)    Exercise Vital Sign      Days of Exercise per Week: 0 days      Minutes of Exercise per Session: 0 min   Stress: Stress Concern Present (2024)    [employer] - Occupational Stress Questionnaire      Feeling of Stress : To some extent   Social Connections: Unknown (2024)    Social Connection and Isolation Panel [NHANES]      Frequency of Communication with Friends and Family: Not on file      Frequency of Social Gatherings with Friends and Family: Once a week      Attends Confucianist Services: Not on file      Active Member of Clubs or Organizations: Not on file      Attends Club or Organization Meetings: Not on file      Marital Status: Not on file   Interpersonal Safety: Low Risk  (2024)    Interpersonal Safety      Do you feel physically and emotionally safe where you currently live?: Yes      Within the past 12 months, have you been hit, slapped, kicked or otherwise physically hurt by someone?: No      Within the past 12 months, have you been humiliated or emotionally abused in other ways by your partner or ex-partner?: No   Housing Stability: Low Risk  (2024)    Housing Stability      Do you have housing? : Yes      Are you worried about losing your housing?: No            Family History:       Family History   Problem Relation Age of Onset     Arthritis Mother      Alzheimer Disease Mother      Hyperlipidemia Mother      Osteoporosis Mother      Heart Disease Father         MI ( from this)     Alcohol/Drug Father      Arthritis Sister      Hypertension Sister      Other Cancer Sister         Luekemia     Neurologic Disorder Sister         Schizophrenic     Hypertension Sister              Hyperlipidemia Sister      Mental Illness Sister         Bipolar     Glaucoma Daughter      Diabetes Son         type 1,      Diabetes Son         ,  cancer     Esophageal Cancer Son         Drank and smoked      Substance Abuse Son      Diabetes Other         type 2     Hyperlipidemia Other             Medications:     Current Outpatient Medications   Medication Sig Dispense Refill     acetaminophen (TYLENOL) 325 MG tablet Take 2 tablets (650 mg) by mouth every 6 hours as needed for mild pain Max tylenol 3000 mg in 24 hours       albuterol (PROAIR HFA/PROVENTIL HFA/VENTOLIN HFA) 108 (90 Base) MCG/ACT inhaler Inhale 2 puffs into the lungs every 6 hours as needed for shortness of breath, wheezing or cough       atorvastatin (LIPITOR) 40 MG tablet Take 1 tablet (40 mg) by mouth daily 90 tablet 3     buPROPion (WELLBUTRIN XL) 150 MG 24 hr tablet Take 1 tablet (150 mg) by mouth every morning 30 tablet 0     CALCIUM CITRATE PO Take 300 mg by mouth daily Take with meal that contains least amount of calcium       carboxymethylcellulose PF (REFRESH PLUS) 0.5 % ophthalmic solution Place 1 drop into both eyes 2 times daily as needed for dry eyes       Cholecalciferol (VITAMIN D-3 PO) Take 2,000 Units by mouth daily       Ferrous Gluconate 324 (37.5 Fe) MG TABS Take 1 tablet by mouth daily Started 6/2023       hydrocortisone 2.5 % cream Apply topically 2 times daily For up to two weeks 20 g 0     ipratropium (ATROVENT) 0.06 % nasal spray Spray 2 sprays into both nostrils 4 times daily as needed for rhinitis (nasal drainage) 15 mL 11     leucovorin (WELLCOVORIN) 5 MG tablet Take 1 tablet (5 mg) by mouth every 7 days . Take 24 hours after taking Methotrexate each week. 13 tablet 2     loratadine (CLARITIN) 10 MG tablet Take 1 tablet (10 mg) by mouth 2 times daily       Methotrexate, PF, (RASUVO) 25 MG/0.5ML autoinjector Inject 0.5 mLs (25 mg) Subcutaneous every 7 days . Hold for signs of infection, and seek medical attention. Take every Thursday. 2 mL 6     naproxen (NAPROSYN) 500 MG tablet Take 1 tablet (500 mg) by mouth 2 times daily as needed for moderate pain (take with food) 30 tablet 0     olopatadine (PATADAY) 0.2 % ophthalmic  solution Place 1 drop into both eyes daily as needed Uses mostly in Spring       pantoprazole (PROTONIX) 40 MG EC tablet TAKE ONE TABLET BY MOUTH EVERY MORNING BEFORE BREAKFAST 90 tablet 0     pregabalin (LYRICA) 50 MG capsule Take 1 capsule (50 mg) by mouth 3 times daily 270 capsule 1     sertraline (ZOLOFT) 100 MG tablet Take 1 tablet (100 mg) by mouth daily 90 tablet 1     triamcinolone (KENALOG) 0.1 % external ointment Apply topically 2 times daily (Patient not taking: Reported on 8/13/2024)       UNABLE TO FIND MEDICATION NAME: Distilled water for CPAP       upadacitinib ER (RINVOQ) 15 MG tablet Take 1 tablet (15 mg) by mouth daily . Hold for any infection and seek medical attention. 30 tablet 6     valACYclovir (VALTREX) 500 MG tablet TAKE ONE TABLET BY MOUTH TWICE A  tablet 1     vitamin C (ASCORBIC ACID) 1000 MG TABS Take 1 tablet (1,000 mg) by mouth daily       zoledronic Acid (RECLAST) 5 MG/100ML SOLN infusion Inject 5 mg into the vein once Every year (next dose March 2023)       No current facility-administered medications for this visit.              Review of Systems:   A comprehensive 10 point review of systems (constitutional, ENT, cardiac, peripheral vascular, lymphatic, respiratory, GI, , Musculoskeletal, skin, Neurological) was performed and found to be negative except as described in this note.     See intake form completed by patient         Again, thank you for allowing me to participate in the care of your patient.        Sincerely,        Eugenio Jeffries MD

## 2024-09-04 NOTE — PATIENT INSTRUCTIONS
Schedule surgery.   Surgery Scheduler will contact you to assist with scheduling surgery.   You can contact her directly at [phone].   Prior to your scheduled surgery we advise scheduling with your dentist to obtain clearance for surgery, and to complete any recommended dental work prior.     Pre-operative Physical needed within 30 days of scheduled proceedure  Physical Therapy will be scheduled to start post op day 3-5.    For mor information regarding total joint surgery please visit our website:   [email or web address]    FORMS:   If you are needing any forms completed relating to your upcoming procedure, please send them to our office with a completed Release of Information.   Forms will be completed AFTER your procedure. A letter can be sent to your employer prior to surgery to inform them of your anticipated time off.    Please notify our staff if you would like a letter to do so.   Forms can be faxed directly to our clinic at [phone].     DO NOT BRING FORMS ON THE DATE OF SURGERY.     MEDICATION REFILL:   Please allow 3 business days for completion of medication refills for any surgery related prescription.   Medication refills submitted on Friday, may not be addressed until the following Monday.  You may request a refill via [employer], or by calling our Nurse Triage at [phone].    Call my office with any questions or concerns, [phone].

## 2024-09-04 NOTE — TELEPHONE ENCOUNTER
Other: wants to know if the doctor can prescribe a pain medication until she is able to have surgery     Could we send this information to you in "[employer] or would you prefer to receive a phone call?:   Patient would prefer a phone call   Okay to leave a detailed message?: Yes at Cell number on file:    Telephone Information:   Mobile [phone]

## 2024-09-04 NOTE — TELEPHONE ENCOUNTER
Phoned patient and let her know that Dr. Jeffries does not prescribe pre-operative narcotics.  Advised her to reach out to her Pain Clinic to discuss pain management until her surgery date.  Patient expressed understanding and has no further questions.    Sneha Mack RN on 9/4/2024 at 12:06 PM

## 2024-09-06 ENCOUNTER — THERAPY VISIT (OUTPATIENT)
Dept: PHYSICAL THERAPY | Facility: CLINIC | Age: 84
End: 2024-09-06
Payer: COMMERCIAL

## 2024-09-06 DIAGNOSIS — M54.50 CHRONIC BILATERAL LOW BACK PAIN WITHOUT SCIATICA: Primary | ICD-10-CM

## 2024-09-06 DIAGNOSIS — G89.29 CHRONIC BILATERAL LOW BACK PAIN WITHOUT SCIATICA: Primary | ICD-10-CM

## 2024-09-06 PROCEDURE — 97112 NEUROMUSCULAR REEDUCATION: CPT | Mod: GP | Performed by: PHYSICAL THERAPIST

## 2024-09-06 PROCEDURE — 97110 THERAPEUTIC EXERCISES: CPT | Mod: GP | Performed by: PHYSICAL THERAPIST

## 2024-09-06 ASSESSMENT — PAIN SCALES - PAIN ENJOYMENT GENERAL ACTIVITY SCALE (PEG)
PEG_TOTALSCORE: 5.67
INTERFERED_ENJOYMENT_LIFE: 5
AVG_PAIN_PASTWEEK: 7
INTERFERED_GENERAL_ACTIVITY: 5

## 2024-09-10 ENCOUNTER — VIRTUAL VISIT (OUTPATIENT)
Dept: RHEUMATOLOGY | Facility: CLINIC | Age: 84
End: 2024-09-10
Payer: COMMERCIAL

## 2024-09-10 DIAGNOSIS — Z79.899 HIGH RISK MEDICATION USE: ICD-10-CM

## 2024-09-10 DIAGNOSIS — M05.79 RHEUMATOID ARTHRITIS INVOLVING MULTIPLE SITES WITH POSITIVE RHEUMATOID FACTOR (H): Primary | ICD-10-CM

## 2024-09-10 PROCEDURE — G2211 COMPLEX E/M VISIT ADD ON: HCPCS | Mod: 95 | Performed by: INTERNAL MEDICINE

## 2024-09-10 PROCEDURE — 99214 OFFICE O/P EST MOD 30 MIN: CPT | Mod: 95 | Performed by: INTERNAL MEDICINE

## 2024-09-10 RX ORDER — METHOTREXATE 25 MG/.5ML
25 INJECTION, SOLUTION SUBCUTANEOUS
Qty: 2 ML | Refills: 9 | Status: SHIPPED | OUTPATIENT
Start: 2024-09-10

## 2024-09-10 RX ORDER — UPADACITINIB 15 MG/1
15 TABLET, EXTENDED RELEASE ORAL DAILY
Qty: 30 TABLET | Refills: 9 | Status: SHIPPED | OUTPATIENT
Start: 2024-09-10

## 2024-09-10 RX ORDER — LEUCOVORIN CALCIUM 5 MG/1
5 TABLET ORAL
Qty: 13 TABLET | Refills: 2 | Status: SHIPPED | OUTPATIENT
Start: 2024-09-10

## 2024-09-10 NOTE — PATIENT INSTRUCTIONS
Perioperative DMARD management:  - Continue methotrexate perioperatively  - Hold Rinvoq for 4 days prior to surgery; and restart no sooner than 2 weeks after the surgery is completed and only in the absence of infection, absence of delayed wound healing, and only after given approval to restart by the surgeon.      ------------------------------        RHEUMATOLOGY    [facility]  [address] [place] [address]  [place] [address]    Phone number: [phone]  Fax number: [phone]    If you need a medication refill, please contact us as you may need lab work and/or a follow up visit prior to your refill.      Thank you for choosing  [facility] [place]!    Samantha Morales [facility] Rheumatology

## 2024-09-10 NOTE — PROGRESS NOTES
Ginger Marshall  is a 84 year old year old who is being evaluated via a billable video visit.      How would you like to obtain your AVS? MyChart  If the video visit is dropped, the invitation should be resent by: Text to cell phone: [phone]   Will anyone else be joining your video visit? No     Rheumatology Video Visit      Ginger Marshall MRN# [id number]   YOB: 1940 Age: 84 year old      Date of visit: 9/10/24   PCP: Dr. Duy Leyva    Chief Complaint   Patient presents with:  Rheumatoid Arthritis    Assessment and Plan     1. Seropositive Erosive Rheumatoid Arthritis ( [2009], CCP >100 [2009]; hx of rheumatoid nodule by 6/14/2011 pathology): Previously failed remicade (staph infection during therapy, but was immediately after a joint injection), orencia (migraines), HCQ (ineffective), Xeljanz (partially effective).  Sulfa allergy.  Currently on methotrexate 25 mg SQ once weekly (dose reduction in the past resulted in more symptoms), leucovorin 5 mg weekly (resolved nasal sore that didn't improve with higher daily folic acid doses), and Rinvoq 15mg daily.  RA well controlled since changing to Rinvoq.   In the future, if changes needed, she will consider changing to Simponi IV or SQ.  Chronic illness.    - Continue methotrexate 25mg SQ once weekly (Rasuvo)  - Continue leucovorin 5 mg every 7 days, 24 hours after MTX dose.   - Continue Rinvoq 15 mg daily  - Labs every 3 months: CBC, Creatinine, Hepatic Panel, ESR, CRP    High risk medication requiring intensive toxicity monitoring at least quarterly    2. 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  Doing okay at this time.     3. Right hip pain: Status post WALTER twice in the past. Followed by [facility] orthopedics as needed.  Symptoms are degenerative in nature.  Reportedly doing well at this time per patient    4.  Left hip osteoarthritis: Planning for WALTER.    Perioperative DMARD  management:  - Continue methotrexate perioperatively  - Hold Rinvoq for 4 days prior to surgery; and restart no sooner than 2 weeks after the surgery is completed and only in the absence of infection, absence of delayed wound healing, and only after given approval to restart by the surgeon.      5. 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 Michele who is now at [facility].  Has been seen at [facility] and [place] [facility].  She previously reported that a TENS unit was helpful at one point, then not helpful.   Now following in the pain clinic where steroid injection was helpful.    6.  Thoracic back pain: Degenerative in nature.  And degenerative changes seen on 2021 chest CT.  Continue physical therapy exercises at home, as these are helpful.    7.  Lift chair request: Difficulty getting out of a chair and she would like to have a lift chair.  Advised physical therapy for general strengthening and assessment for lift chair    8.  Osteoporosis: was previously managed by endocrinology and she received reclast once in 2013, 2014, 2016. 12/12/2018 DEXA ordered by Dr. Vázquez showed osteoporosis.  Repeat DEXA on 2/2/2022 showed osteoporosis; no significant change of the hips; forearm osteoporosis but no previous evaluation of the forearm for comparison.  Reclast was restarted after sufficient drug holiday, with the first dose on 3/18/2021; again received in 3/21/2022, 3/21/2023, 3/25/2024.  DEXA being performed later today.  Chronic illness, stable.      - Reclast once yearly; next due on 3/25/2025 or later  - Continue vitamin D 1000 IU daily  - Continue calcium 1200mg daily from supplement and dietary sources    9. Left 1st toe pain, history: 2 episodes of sudden onset left toe pain followed by diffuse left foot pain that made ambulation difficult.  Also with nodules over both Achilles tendons and the right elbow that are either rheumatoid nodules  or tophi.  She reports having history of gout decades ago.  Denies ever being on colchicine or allopurinol.  Discussed colchicine to use if suspected gout flare occurs.  No flares since last seen so has not used colchicine.  - Colchicine: (MITIGARE) 0.6 MG capsule; Take 2 capsules (1.2 mg) by mouth once for 1 dose at the onset of a gout flare, followed by 1 capsule (0.6mg) 1 hour later.      10.  Chronic pain syndrome: Documented here for historical significance only.   Management per pain clinic and/or PCP    11.  Vaccinations: Vaccinations reviewed with Ms. Marshall.  Risks and benefits of vaccinations were discussed.    - Influenza: encouraged yearly vaccination, and to hold methotrexate for 1-2 weeks afterward  - [id number]: up to date  - [id number]: up to date  - Shingrix: Up to date   - COVID-19: Advised keeping updated, and to hold methotrexate and Rinvoq for 1-2 weeks afterward.  - RSV: Advised vaccination    12.  Historical: Nasal sores: Only occurs between December-March each year.  Reports using a humidifier on her CPAP.  Has seen ENT but was determined that it was likely vasomotor rhinitis with constant nasal drip that irritates the nose.  She is advised by ENT to use a humidifier at night and to use Atrovent nasal spray.  She will follow with ENT as needed for this issue.    13. Elevated blood pressure:  Ginger to follow up with Primary Care provider regarding elevated blood pressure.     Total minutes spent in evaluation with patient, documentation, , and review of pertinent studies and chart notes: 14  The longitudinal plan of care for the rheumatology problem(s) were addressed during this visit.  Due to added complexity of care, we will continue to support the patient and the subsequent management of this condition with ongoing continuity of care.       Ms. Marshall verbalized agreement with and understanding of the rational for the diagnosis and treatment plan.  All questions were answered to  best of my ability and the patient's satisfaction. Ms. Marshall was advised to contact the clinic with any questions that may arise after the clinic visit.      Thank you for involving me in the care of the patient    Return to clinic: 3 months    HPI   Ginger Marshall is a 84 year old female with a history of multiple foot surgeries, hand tendon transfers, MCP replacements (most recent being in August 2015), bilateral TKA, right WALTER, left distal radius fracture history, gout, s/p lumbar fusion, obstructive sleep apnea and uses a CPAP, osteoporosis and seropositive (RF+, CCP+) erosive rheumatoid arthritis who presents for follow-up of rheumatoid arthritis.      2/6/2024: RA controlled.  Nasal sores each year between December-March.  Uses a humidifier on her CPAP.  Has not noticed any change with methotrexate use.  No oral sores.  Chronic low back pain that radiates to the left leg; she would like to be evaluated in the pain clinic; she does not want to return to the spine surgeon because she does not want surgery.  He does not want to try reducing methotrexate because she recalls having worsening joint symptoms with dose reduction in the past.    4/4/2024: Here sooner than previously scheduled because she would like to discuss alternatives to Rinvoq considering history of recurrent shingles.  She is now taking Valtrex daily for suppressive benefits and has not had shingles since being on Valtrex.  She is not interested in changing medications at this time because she does not want to risk potentially having worsening joint pain; she says that her RA is well-controlled at this time.    5/17/2024: joint pain with weather changes; affecting the legs and low back.  States that she had injections with the pain clinic  without improvement.  Fell when getting out of her recliner; left ankle is swollen but not painful and she says it is already improving so she is going to monitor it but if it worsens or becomes painful then  she will go into the clinic to have it evaluated.  Felt better when she was doing physical therapy exercises for her back but she stopped doing physical therapy exercises for her back on her own and her back pain has worsened.  She would like to go to the pain clinic and physical therapy.  She would like a lift chair because she has difficulty getting out of her recliner; she says she spoke with her primary care provider about this but there is no action taken.    Today, 9/10/2024: RA controlled.  Went to the pain clinic where steroid injection of her back was very effective; but then she noticed that her left hip was very painful so she is planning for left WALTER soon.  Other than pain of the left hip at this time she has no other joint pain.  Morning stiffness for less than 20 minutes.  No joint swelling.    Denies fevers, chills, nausea, vomiting, constipation, diarrhea. No abdominal pain. No chest pain/pressure, palpitations, or shortness of breath.     Tobacco: quit in 1999  EtOH: 1 glass of wine every month at most  Drugs: None  Occupation: , retired     ROS   12 point review of system was completed and negative except as noted in the HPI     Active Problem List     Patient Active Problem List   Diagnosis    Rheumatoid arthritis involving right hand with positive rheumatoid factor (H)    History of colonic polyps    Multinodular goiter    Pulmonary nodule    PSEUDOPHAKIA OU    PVD (POSTERIOR VITREOUS DETACHMENT) OU    PXF (PSEUDOEXFOLIATION OF LENS CAPSULE) OD    GERD (gastroesophageal reflux disease)    Hyperlipidemia LDL goal <100    Neuropathy    Sleep apnea    Anemia of chronic disease    Osteoporosis    Cornea guttata, ou    Conjunctival concretions    Stenosis, spinal, lumbar    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    MGD (meibomian gland dysfunction)    Prediabetes    Allergic state, subsequent encounter    Anxiety    DDD (degenerative disc disease), lumbar    Chronic  right shoulder pain    Moderate episode of recurrent major depressive disorder (H)    Rheumatic mitral stenosis    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    History of bisphosphonate therapy    Morbid obesity (H)    Immunocompromised (H24)    Thoracic spine pain    Herpes simplex vulvovaginitis     Past Medical History     Past Medical History:   Diagnosis Date    Acute posthemorrhagic anemia 10/13/2012    Closed fracture of multiple ribs of left side, initial encounter 2019    Closed fracture of olecranon process of right ulna with routine healing 2023    Closed fracture of shaft of left ulna, unspecified fracture morphology, initial encounter 2023    COPD (chronic obstructive pulmonary disease) (H)     no longer occurring    Depressive disorder     Ex-smoker 1999    Gastroenteritis 2020    Gastroenteritis with norovirus    Herniated nucleus pulposus, L3-4 2017    Hip joint replacement by other means 07/10/2008    History of blood transfusion     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    History of total knee replacement 2009    Menopause 1989    late 40's    Migraine 2014    resolved    Multinodular goiter     Other chronic pain     joints    Other closed intra-articular fracture of distal end of left radius, initial encounter 2023    Pelvic fracture (H) 2014    Rheumatic mitral stenosis     Rheumatoid arteritis (H)     S/P lumbar fusion 2017    Sleep apnea     Vitamin B12 deficiency      Past Surgical History     Past Surgical History:   Procedure Laterality Date    ABDOMEN SURGERY      c sections    ARTHROSCOPY KNEE Left     ARTHROSCOPY KNEE RT/LT  2006    left    BACK SURGERY  2013    disc    BIOPSY BREAST      BREAST SURGERY      lumpectomy 's    BREAST SURGERY      lumpectomy    CATARACT EXTRACTION Bilateral     CATARACT IOL, RT/LT Bilateral 2010 aproximately     SECTION  1966      SECTION  1972    COLONOSCOPY  2009,    COLONOSCOPY      FINGER SURGERY Right 2019    Procedure: DISTAL ULNA RESECTION, RIGHT MIDDLE SILASTIC METACARPALPHALANGEAL EXCHANGE AND RIGHT ELBOW NODULE EXCISION.;  Surgeon: Hilario Redmond MD;  Location: [facility];  Service: Orthopedics    FOOT SURGERY Left     GYN SURGERY  66,72    HAND SURGERY Right     HAND SURGERY Right     HC ESOPH/GAS REFLUX TEST W NASAL IMPED >1 HR  2012    Procedure:ESOPHAGEAL IMPEDENCE FUNCTION TEST WITH 24 HOUR PH GREATER THAN 1 HOUR; Surgeon:KAYKAY JULIO; Location:[facility]    IR LUMBAR EPIDURAL STEROID INJECTION      IR TRANSLAMINAR EPIDURAL LUMBAR INJ INCL IMAGING  2012    [facility] L5-S1 at [facility]    LUMBAR FUSION      OPEN REDUCTION INTERNAL FIXATION ELBOW Right 2023    Procedure: OPEN REDUCTION INTERNAL FIXATION, RIGHT OLECRANON FRACTURE;  Surgeon: Pili Brock MD;  Location:  OR    OPEN REDUCTION INTERNAL FIXATION WRIST Left 2023    Procedure: OPEN REDUCTION INTERNAL FIXATION, LEFT DISTAL RADIUS FRACTURE;  Surgeon: Pili Brock MD;  Location:  OR    OPTICAL TRACKING SYSTEM FUSION POSTERIOR SPINE LUMBAR N/A 2017    Procedure: OPTICAL TRACKING SYSTEM FUSION SPINE POSTERIOR LUMBAR ONE LEVEL;  Surgeon: Anthony Michele MD;  Location:  OR    ORTHOPEDIC SURGERY      left foot surgery    ORTHOPEDIC SURGERY      R MCP surgery    ORTHOPEDIC SURGERY      right knee total replacement    TOTAL HIP ARTHROPLASTY Right     TOTAL KNEE ARTHROPLASTY Left     TOTAL KNEE ARTHROPLASTY Right     ZZC ANESTH,TOTAL HIP ARTHROPLASTY      Rt hip    ZZC HAND/FINGER SURGERY UNLISTED  2005    right hand    ZZC TOTAL KNEE ARTHROPLASTY      left     Allergy     Allergies   Allergen Reactions    Abatacept Other (See Comments)     Severe headaches  Migraine    Celebrex [Roche-2 Inhibitors (Sulfonamide)]      Ineffective    Celecoxib Unknown and Nausea    Levaquin  "[Levofloxacin] Fatigue     Severe body pain    Orencia [Abatacept]      Headache    Septra [Bactrim]     Sulfa Antibiotics Nausea and Vomiting     \"deathly ill\"  Allergic to everything with Sulfa in it.    Sulfamethoxazole-Trimethoprim Nausea and Nausea and Vomiting    Tramadol Other (See Comments)     Headache  Migraine headache    Valdecoxib Unknown and Nausea     Made me \"very very ill\", might of been \"cramping\"    Adhesive Tape Rash     Plastic tape  Plastic tapes    Antihistamines, Chlorpheniramine-Type  [Antihistamines, Chlorpheniramine-Type] Anxiety and Other (See Comments)     Current Medication List     Current Outpatient Medications   Medication Sig Dispense Refill    acetaminophen (TYLENOL) 325 MG tablet Take 2 tablets (650 mg) by mouth every 6 hours as needed for mild pain Max tylenol 3000 mg in 24 hours      albuterol (PROAIR HFA/PROVENTIL HFA/VENTOLIN HFA) 108 (90 Base) MCG/ACT inhaler Inhale 2 puffs into the lungs every 6 hours as needed for shortness of breath, wheezing or cough      atorvastatin (LIPITOR) 40 MG tablet Take 1 tablet (40 mg) by mouth daily 90 tablet 3    buPROPion (WELLBUTRIN XL) 150 MG 24 hr tablet Take 1 tablet (150 mg) by mouth every morning 30 tablet 0    CALCIUM CITRATE PO Take 300 mg by mouth daily Take with meal that contains least amount of calcium      carboxymethylcellulose PF (REFRESH PLUS) 0.5 % ophthalmic solution Place 1 drop into both eyes 2 times daily as needed for dry eyes      Cholecalciferol (VITAMIN D-3 PO) Take 2,000 Units by mouth daily      Ferrous Gluconate 324 (37.5 Fe) MG TABS Take 1 tablet by mouth daily Started 6/2023      hydrocortisone 2.5 % cream Apply topically 2 times daily For up to two weeks 20 g 0    ipratropium (ATROVENT) 0.06 % nasal spray Spray 2 sprays into both nostrils 4 times daily as needed for rhinitis (nasal drainage) 15 mL 11    leucovorin (WELLCOVORIN) 5 MG tablet Take 1 tablet (5 mg) by mouth every 7 days . Take 24 hours after taking " Methotrexate each week. 13 tablet 2    loratadine (CLARITIN) 10 MG tablet Take 1 tablet (10 mg) by mouth 2 times daily      Methotrexate, PF, (RASUVO) 25 MG/0.5ML autoinjector Inject 0.5 mLs (25 mg) Subcutaneous every 7 days . Hold for signs of infection, and seek medical attention. Take every Thursday. 2 mL 6    naproxen (NAPROSYN) 500 MG tablet Take 1 tablet (500 mg) by mouth 2 times daily as needed for moderate pain (take with food) 30 tablet 0    olopatadine (PATADAY) 0.2 % ophthalmic solution Place 1 drop into both eyes daily as needed Uses mostly in Spring      pantoprazole (PROTONIX) 40 MG EC tablet TAKE ONE TABLET BY MOUTH EVERY MORNING BEFORE BREAKFAST 90 tablet 0    pregabalin (LYRICA) 50 MG capsule Take 1 capsule (50 mg) by mouth 3 times daily 270 capsule 1    sertraline (ZOLOFT) 100 MG tablet Take 1 tablet (100 mg) by mouth daily 90 tablet 1    upadacitinib ER (RINVOQ) 15 MG tablet Take 1 tablet (15 mg) by mouth daily . Hold for any infection and seek medical attention. 30 tablet 6    valACYclovir (VALTREX) 500 MG tablet TAKE ONE TABLET BY MOUTH TWICE A  tablet 1    vitamin C (ASCORBIC ACID) 1000 MG TABS Take 1 tablet (1,000 mg) by mouth daily      zoledronic Acid (RECLAST) 5 MG/100ML SOLN infusion Inject 5 mg into the vein once Every year (next dose 2023)      triamcinolone (KENALOG) 0.1 % external ointment Apply topically 2 times daily (Patient not taking: Reported on 2024)      UNABLE TO FIND MEDICATION NAME: Distilled water for CPAP       No current facility-administered medications for this visit.     Social History   See HPI    Family History     Family History   Problem Relation Age of Onset    Arthritis Mother     Alzheimer Disease Mother     Hyperlipidemia Mother     Osteoporosis Mother     Heart Disease Father         MI ( from this)    Alcohol/Drug Father     Arthritis Sister     Hypertension Sister     Other Cancer Sister         Luekemia    Neurologic Disorder Sister   "       Schizophrenic    Hypertension Sister             Hyperlipidemia Sister     Mental Illness Sister         Bipolar    Glaucoma Daughter     Diabetes Son         type 1,     Diabetes Son         ,  cancer    Esophageal Cancer Son         Drank and smoked    Substance Abuse Son     Diabetes Other         type 2    Hyperlipidemia Other      No change in family history since the previous clinic visit.    Physical Exam     Temp Readings from Last 3 Encounters:   24 97.8  F (36.6  C) (Oral)   24 97.8  F (36.6  C) (Oral)   24 98.6  F (37  C)     BP Readings from Last 5 Encounters:   24 (!) 140/58   24 139/68   24 119/70   24 138/71   24 134/68     Pulse Readings from Last 1 Encounters:   24 73     Resp Readings from Last 1 Encounters:   24 18     Estimated body mass index is 35.23 kg/m  as calculated from the following:    Height as of 24: 1.664 m (5' 5.5\").    Weight as of 24: 97.5 kg (215 lb).    GEN: alert and no distress  PSYCH: Alert; coherent speech, normal rate and volume, able to articulate logical thoughts, able   to abstract reason, no tangential thoughts. Normal affect.   RESP: No cough, no audible wheezing, able to talk in full sentences    Labs     CBC  Recent Labs   Lab Test 24  1011 24  0932 24  1001 24  1036 21  1054 05/10/21  1023 21  1019 20  1028   WBC 4.6 6.9 7.1 5.3   < > 5.0 5.7 5.7   RBC 3.34* 3.42* 3.72* 3.65*   < > 3.39* 3.81 3.91   HGB 11.6* 11.8 12.6 12.2   < > 11.3* 12.5 12.0   HCT 35.5 35.4 38.1 37.6   < > 35.6 38.9 37.8   * 104* 102* 103*   < > 105* 102* 97   RDW 14.6 14.1 14.4 14.2   < > 14.3 15.2* 16.1*    317 387 395   < > 333 335 361   MCH 34.7* 34.5* 33.9* 33.4*   < > 33.3* 32.8 30.7   MCHC 32.7 33.3 33.1 32.4   < > 31.7 32.1 31.7   NEUTROPHIL 58  --  68 60   < > 57.7 65.8 60.5   LYMPH 18  --  16 21   < > 18.5 17.0 20.4   MONOCYTE 18  --  " 12 15   < > 17.3 12.1 12.8   EOSINOPHIL 5  --  3 4   < > 6.3 4.9 6.1   BASOPHIL 0  --  0 0   < > 0.2 0.2 0.2   ANEU  --   --   --   --   --  2.9 3.8 3.5   ALYM  --   --   --   --   --  0.9 1.0 1.2   MARYURI  --   --   --   --   --  0.9 0.7 0.7   AEOS  --   --   --   --   --  0.3 0.3 0.4   ABAS  --   --   --   --   --  0.0 0.0 0.0   ANEUTAUTO 2.7  --  4.9 3.2   < >  --   --   --    ALYMPAUTO 0.9  --  1.1 1.1   < >  --   --   --    AMONOAUTO 0.8  --  0.8 0.8   < >  --   --   --    AEOSAUTO 0.2  --  0.2 0.2   < >  --   --   --    ABSBASO 0.0  --  0.0 0.0   < >  --   --   --     < > = values in this interval not displayed.     CMP  Recent Labs   Lab Test 09/04/24  1011 05/14/24  1001 03/25/24  1018 02/06/24  1036 08/22/23  1005 06/20/23  1329 04/25/23  1008 02/22/23  1018 02/12/23  1633 08/18/21  1054 05/10/21  1023 03/18/21  1350 02/16/21  1019 12/18/20  0923 11/16/20  1028   NA  --   --   --   --   --  142  --  142 136   < >  --   --   --   --   --    POTASSIUM  --   --   --   --   --  3.5  --  4.0 3.8   < >  --   --   --   --   --    CHLORIDE  --   --   --   --   --  105  --  103 102   < >  --   --   --   --   --    CO2  --   --   --   --   --  24 -- 25 25   < >  --   --   --   --   --    ANIONGAP  --   --   --   --   --  13  --  14 9   < >  --   --   --   --   --    GLC  --   --   --   --   --  123*  --  104* 159*   < >  --   --   --   --   --    BUN  --   --   --   --   --  9.6  --  11.0 10.3   < >  --   --   --   --   --    CR 0.67 0.55 0.61 0.55   < > 0.60 0.60 0.52 0.52   < > 0.59  --  0.62  --  0.60   GFRESTIMATED 87 >90 89 >90   < > 90 90 >90 >90   < > 87  --  86  --  87   GFRESTBLACK  --   --   --   --   --   --   --   --   --   --  >90  --  >90  --  >90   BRANDEE  --   --  9.2  --   --  9.8 9.8 9.4 8.6*   < >  --    < >  --    < >  --    BILITOTAL 0.4 0.4  --  0.3   < > 0.3 0.2  --   --    < > 0.3  --  0.4  --  0.4   ALBUMIN 4.4 4.5  --  4.5   < > 4.6 4.5  --   --    < > 3.6  --  4.2  --  4.1   PROTTOTAL 7.5 7.9   --  7.6   < > 8.1 7.7  --   --    < > 7.0  --  8.0  --  7.6   ALKPHOS 47 50  --  40   < > 50 53  --   --    < > 45  --  44  --  47   AST 31 29  --  26   < > 29 29  --   --    < > 22  --  21  --  25   ALT 27 23  --  20   < > 21 22  --   --    < > 36  --  34  --  36    < > = values in this interval not displayed.     Calcium/VitaminD  Recent Labs   Lab Test 03/25/24  1018 06/20/23  1329 06/05/23  0941 04/25/23  1008 02/11/23  0819 01/16/23  1122   BRANDEE 9.2 9.8  --  9.8   < > 9.2   VITDT  --   --  60 66  --  95*    < > = values in this interval not displayed.     ESR/CRP  Recent Labs   Lab Test 09/04/24  1011 05/14/24  1001 02/06/24  1036 07/12/22  1112 04/29/22  1000 02/04/22  0832 12/08/21  1053   SED 20 33* 15   < > 15 14 16   CRP  --   --   --   --  <2.9 <2.9 <2.9   CRPI <3.00 3.53 <3.00   < >  --   --   --     < > = values in this interval not displayed.     Hepatitis B  Recent Labs   Lab Test 10/20/22  1256   HBCAB Nonreactive   HEPBANG Nonreactive     Hepatitis C  Recent Labs   Lab Test 10/20/22  1256   HCVAB Nonreactive     Tuberculosis Screening  Recent Labs   Lab Test 02/06/24  1036 02/16/23  0000 10/20/22  1256 08/18/21  1054 05/07/18  1033   TBRES Negative Indeterminate* Negative   < >  --    TBRSLT  --   --   --   --  Negative   TBAGN  --   --   --   --  0.00    < > = values in this interval not displayed.     Immunization History     Immunization History   Administered Date(s) Administered    COVID-19 MONOVALENT 12+ (Pfizer) 02/09/2021, 03/03/2021    Flu, Unspecified 10/20/2013    Influenza (High Dose) Trivalent,PF (Fluzone) 10/28/2013, 09/23/2014, 11/11/2015, 09/23/2016, 09/24/2019    Influenza (IIV3) PF 10/12/1999, 10/26/2001, 10/19/2002, 10/30/2003, 10/28/2004, 10/21/2005, 10/26/2007, 10/21/2009, 10/05/2011, 10/13/2012    Influenza Vaccine (Flucelvax Quadrivalent) 09/28/2017, 09/27/2018    Influenza Vaccine 65+ (Fluzone HD) 10/07/2021, 11/16/2022, 12/11/2023    Influenza Vaccine >6 months,quad, PF  09/24/2020    Influenza Vaccine, 6+MO IM (QUADRIVALENT W/PRESERVATIVES) 11/16/2022    Influenza, seasonal, injectable, PF 10/24/2006, 11/14/2007, 10/22/2008, 10/21/2009    Mantoux Tuberculin Skin Test 11/03/2006    Pneumo Conj 13-V (2010&after) 07/29/2015    Pneumococcal 23 valent 06/26/2000, 10/26/2001, 06/01/2007, 06/02/2010    TD,PF 7+ (Tenivac) 12/10/2008, 07/20/2009    Tdap (Adult) Unspecified Formulation 12/12/2018    Zoster recombinant adjuvanted (SHINGRIX) 12/12/2018, 02/14/2019          Chart documentation done in part with Dragon Voice recognition Software. Although reviewed after completion, some word and grammatical error may remain.      Video-Visit Details    Type of service:  Video Visit    Video Start Time: 11:12 AM    Video End Time: 11:22 AM    Originating Location (pt. Location): [place], [place]    Distant Location (provider location):  on site, [place], [place]    Platform used for Video Visit: Roxanne Byers MD

## 2024-09-11 ENCOUNTER — OFFICE VISIT (OUTPATIENT)
Dept: PALLIATIVE MEDICINE | Facility: CLINIC | Age: 84
End: 2024-09-11
Payer: COMMERCIAL

## 2024-09-11 VITALS — DIASTOLIC BLOOD PRESSURE: 79 MMHG | OXYGEN SATURATION: 98 % | HEART RATE: 67 BPM | SYSTOLIC BLOOD PRESSURE: 133 MMHG

## 2024-09-11 DIAGNOSIS — M05.741 RHEUMATOID ARTHRITIS INVOLVING RIGHT HAND WITH POSITIVE RHEUMATOID FACTOR (H): ICD-10-CM

## 2024-09-11 DIAGNOSIS — M25.552 HIP PAIN, LEFT: ICD-10-CM

## 2024-09-11 DIAGNOSIS — M51.369 DDD (DEGENERATIVE DISC DISEASE), LUMBAR: ICD-10-CM

## 2024-09-11 DIAGNOSIS — G89.29 CHRONIC MIDLINE LOW BACK PAIN WITH LEFT-SIDED SCIATICA: ICD-10-CM

## 2024-09-11 DIAGNOSIS — M53.3 SACROILIAC JOINT PAIN: Primary | ICD-10-CM

## 2024-09-11 DIAGNOSIS — M48.061 SPINAL STENOSIS OF LUMBAR REGION WITHOUT NEUROGENIC CLAUDICATION: ICD-10-CM

## 2024-09-11 DIAGNOSIS — M54.42 CHRONIC MIDLINE LOW BACK PAIN WITH LEFT-SIDED SCIATICA: ICD-10-CM

## 2024-09-11 DIAGNOSIS — M16.12 PRIMARY OSTEOARTHRITIS OF LEFT HIP: ICD-10-CM

## 2024-09-11 DIAGNOSIS — G47.33 OSA (OBSTRUCTIVE SLEEP APNEA): ICD-10-CM

## 2024-09-11 PROBLEM — M96.1 POST-LAMINECTOMY SYNDROME: Status: ACTIVE | Noted: 2024-09-11

## 2024-09-11 PROBLEM — M79.10 MUSCLE PAIN: Status: ACTIVE | Noted: 2024-09-11

## 2024-09-11 PROBLEM — M05.9 RHEUMATOID ARTHRITIS WITH RHEUMATOID FACTOR, UNSPECIFIED (H): Status: ACTIVE | Noted: 2023-02-15

## 2024-09-11 PROBLEM — M54.17 LUMBOSACRAL RADICULOPATHY: Status: ACTIVE | Noted: 2024-09-11
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
## 2024-09-11 PROBLEM — R26.89 OTHER ABNORMALITIES OF GAIT AND MOBILITY: Status: ACTIVE | Noted: 2023-02-15

## 2024-09-11 PROBLEM — M47.817 LUMBOSACRAL SPONDYLOSIS WITHOUT MYELOPATHY: Status: ACTIVE | Noted: 2024-09-11

## 2024-09-11 PROBLEM — M62.81 MUSCLE WEAKNESS (GENERALIZED): Status: ACTIVE | Noted: 2023-02-15

## 2024-09-11 PROCEDURE — 99215 OFFICE O/P EST HI 40 MIN: CPT | Performed by: NURSE PRACTITIONER

## 2024-09-11 PROCEDURE — G2211 COMPLEX E/M VISIT ADD ON: HCPCS | Performed by: NURSE PRACTITIONER

## 2024-09-11 RX ORDER — HYDROCODONE BITARTRATE AND ACETAMINOPHEN 5; 325 MG/1; MG/1
.5-1 TABLET ORAL DAILY PRN
Qty: 30 TABLET | Refills: 0 | Status: SHIPPED | OUTPATIENT
Start: 2024-09-11 | End: 2024-09-24

## 2024-09-11 RX ORDER — LIDOCAINE 50 MG/G
1 PATCH TOPICAL EVERY 24 HOURS
Qty: 30 PATCH | Refills: 1 | Status: SHIPPED | OUTPATIENT
Start: 2024-09-11

## 2024-09-11 RX ORDER — LOTEPREDNOL ETABONATE 5 MG/G
1 OINTMENT OPHTHALMIC AT BEDTIME
COMMUNITY
Start: 2024-06-06 | End: 2024-10-03

## 2024-09-11 ASSESSMENT — PAIN SCALES - GENERAL: PAINLEVEL: EXTREME PAIN (8)

## 2024-09-11 NOTE — PATIENT INSTRUCTIONS
Take diclofenac twice a day. Do not take other NSAIDs (naproxen, ibuprofen). You will need to stop this 7 days prior to your hip surgery.   Okay to use hydrocodone SPARINGLY before surgery. The risk of using this is that you will have a much harder time with pain control after surgery if you are taking this regularly before surgery.   Sent in prescription for lidocaine patches 5%, insurance may not cover this.   _____________________________________________________  Pain Clinic telephone number: [phone]. Messages are returned Monday - Friday between 8 AM and 4:00 PM.  If you need a medication refilled, call the clinic or send a [employer] message with the medication name and pharmacy 7 days in advance as it may take 3-4 days to process.  We do not refill medications on evenings or weekends.

## 2024-09-11 NOTE — PROGRESS NOTES
Patient presents to the clinic today for a visit with Kayla Mathur NP regarding Pain Management.      Chief Complaint   Patient presents with    Pain     PT explains that she is scheduled for hip surgery. PT has experienced worsening hip pain since last visit. PT has no pain medication to help alleviate the pain, and the pain has decreased mobility drastically. The injections for the back pain did help, pt states.         Extreme Pain (8)        /79   Pulse 67   SpO2 98%           7/7/2024     7:30 AM 8/12/2024     1:53 PM 8/19/2024     9:41 AM   PHQ-9 SCORE   PHQ-9 Total Score MyChart 6 (Mild depression) 10 (Moderate depression) 11 (Moderate depression)   PHQ-9 Total Score 6 10 11           2/28/2024    10:14 AM 4/2/2024     9:05 PM 5/31/2024     9:14 AM   SPRING-7 SCORE   Total Score 5 (mild anxiety) 1 (minimal anxiety) 1 (minimal anxiety)   Total Score 5 1 1           9/11/2024    10:40 AM   PEG Score   PEG Total Score 8.67           Saida Mistry, Clinic Facilitator  [facility]

## 2024-09-11 NOTE — PROGRESS NOTES
"[facility] Pain Management   Date of Visit: 9/11/2024  Last visit: 8/6/2024  Original Consult: 02/16/2016- 6/1/2016 treated with Carmen Torres, 6/4/2024 first visit with me    Ginger is being seen today for ongoing management of chronic pain.     History of Present Illness    Chronic lower back pain for many years. Has had multiple spinal injections without long term relief. She states that she is not interested in any  more injections as they do not seem to provide long term relief. She did feel that the last injection she had gave her relief of left leg pain.   Multiple joint pain, secondary to OA and RA    Recommendations from last visit:  Continue Lyrica 50 mg TID. Ordered left SI joint injection. She will continue Pain PT. Advised intermittent naproxen use, especially if she is planning on being more active. Advised follow-up if symptoms are worsening or not improved with SI joint injection.    ____________________________________________________________  Interval History   - Left sided back pain is much better after left SI joint injection on 8/14/24.  - Left hip pain worsened since last seen. Treated on 7/9/24 with [facility] (no change in pain) and was seen by Dr. Jeffries. Left hybrid WALTER scheduled for 10/22/24. Requests \"stronger pain meds\" to use until surgery.   - Has been participating in Pain PT and has found this to be helpful. Left hip pain is limiting her ability to participate, but she is trying to stay as active as she can.   -She is dissatisfied with her current level of pain. She using ice.  She doubled her naproxen dose on her own. She tried 800 mg of ibuprofen. She has tried lidocaine patches, but thinks that they are not working as they are out of date. TENS unit is on order. \"I called Dr. Jeffries and they said that they cannot prescribe stronger pain medications.\"   -She has increased pain with cleaning her bathroom, standing up to cook for longer periods. She can no longer walk as long " as she wants even with her walker. She reports that she is leaving her house only minimally due to pain.     Pain description:  Location: left groin, leg  Quality: aching, sharp, throbbing   Duration: intermittent, minimal pain at rest    Severity/Intensity: Extreme Pain (8)  Aggravating factors include: standing, walking, sitting, changing positions  Relieving factors include: nothing    Pain Intensity and Interference in the past week  Date PEG   6/4/24 5.67   8/6/2024 5.33   9/11/2024 5.67         Current pain medications:  acetaminophen prn  Naproxen 1000 mg QAM (above recommended & prescribed dose)  Pregabalin 50 mg TID     Other relevant medications:  Sertraline  Rinvoq    PAIN MANAGEMENT TREATMENT HISTORY  1. MEDICATIONS:  Opioids: Hydrocodone (tolerates this best), Percocet (states she does not like this), Tramadol -SE, Migraines  NSAIDs: Celecoxib (allergy), Naproxen  Muscle relaxants: Cyclobenzaprine, Tizanidine - doesn't recall response  Pain Antidepressants/ Anxiolytics: Duloxetine   Sleep Aids: Ambien (helpful), Rozarem (unsure)   Neuroleptics: Gabapentin -not helpful, Pregabalin unsure if helpful  Topical: Lidoderm patches -not helpful  Adjuvant medications: Acetaminophen - somewhat helpful for headaches, Prednisone  2. PHYSICAL THERAPY:   Started Pain focused PT with Margaux Strong on 7/1/24, has completed 8 visits  3. PAIN PSYCHOLOGY:   Has not tried  4. SURGERY:   L4-5 hemilaminectomy  5. INJECTIONS:   [facility] pain clinic   - BL L5-S1 TF OLIVIA 2/20/2024 - 2 weeks of relief  [facility]   -Left L4-5 TF OLIVIA 1/2022   - L4-5 TF OLIVIA 4/2021- no relief   - BL L4-S1 MBB 11/2021- no relief   - Bilateral SI joint injections 9/2021  [facility]  Left SI Joint injection with Dr. Renteria on 8/14/24  T10- T11 IL OLIVIA with Dr. Renteria 1/23/2020  Caudal OLIVIA with Dr. Hare 7/1/2019  B SI joint injections, right gluteal TPI with Dr. Hare 3/3/2016, 9/26/2016  L5-S1 IL OLIVIA with Dr. Cota  6. COMPLEMENTARY  THERAPY:  Chiropractic: Has not tried  Acupuncture/acupressure: Tried, does not recall result  TENS unit: Tried, does not recall result  heat/cold applications: ice is helpful when she uses it.  7. PREVIOUS PAIN CLINIC:  Ginger has been seen at [facility], [facility], [facility], and [facility] in the past      Past Medical History   She has a past medical history of Acute posthemorrhagic anemia (10/13/2012), Closed fracture of multiple ribs of left side, initial encounter (2019), Closed fracture of olecranon process of right ulna with routine healing (2023), Closed fracture of shaft of left ulna, unspecified fracture morphology, initial encounter (2023), COPD (chronic obstructive pulmonary disease) (H) (), Depressive disorder, Ex-smoker (1999), Gastroenteritis (2020), Herniated nucleus pulposus, L3-4 (2017), Hip joint replacement by other means (07/10/2008), History of blood transfusion, History of total hip replacement (10/11/2012), History of total knee replacement (2009), Menopause (), Migraine (2014), Multinodular goiter, Other chronic pain, Other closed intra-articular fracture of distal end of left radius, initial encounter (2023), Pelvic fracture (H) (2014), Rheumatic mitral stenosis, Rheumatoid arteritis (H), S/P lumbar fusion (2017), Sleep apnea, and Vitamin B12 deficiency.   Past Surgical History   She has a past surgical history that includes  section (); orthopedic surgery (); orthopedic surgery (); arthroscopy knee rt/lt (2006); TOTAL KNEE ARTHROPLASTY (); HAND/FINGER SURGERY UNLISTED (2005); colonoscopy (); ANESTH,TOTAL HIP ARTHROPLASTY (); ESOPH/GAS REFLUX TEST W NASAL IMPED >1 HR (2012); IR Translaminar Epidural Lumbar Inj Incl Imaging (2012); orthopedic surgery (); Breast surgery (); back surgery (); cataract iol, rt/lt (Bilateral, ); Optical  tracking system fusion spine posterior lumbar one level (N/A, 2017);  section (1972); Total Knee Arthroplasty (Left); Total Knee Arthroplasty (Right); Total Hip Arthroplasty (Right); Lumbar Fusion; Abdomen surgery; Arthroscopy knee (Left); Biopsy breast; Breast surgery; Hand surgery (Right); Foot surgery (Left); Hand surgery (Right); Cataract Extraction (Bilateral); Colonoscopy; Ir Lumbar Epidural Steroid Injection; Finger surgery (Right, 2019); GYN surgery ([age]); Open reduction internal fixation wrist (Left, 2023); and Open reduction internal fixation elbow (Right, 2023).   Social History   She reports that she quit smoking about 25 years ago. Her smoking use included cigarettes. She started smoking about 66 years ago. She has a 41 pack-year smoking history. She has never used smokeless tobacco. She reports current alcohol use. She reports that she does not use drugs.  Social History     Social History Narrative    She lives in a a senior living apartment building.    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  Medications    has a current medication list which includes the following prescription(s): acetaminophen, albuterol, atorvastatin, bupropion, calcium citrate, carboxymethylcellulose pf, cholecalciferol, ferrous gluconate, hydrocortisone, ipratropium, leucovorin, loratadine, lotemax, rasuvo, naproxen, olopatadine, pantoprazole, pregabalin, sertraline, UNABLE TO FIND, rinvoq, valacyclovir, vitamin c, zoledronic acid, and triamcinolone.  Allergies   Abatacept; Celebrex [tyler-2 inhibitors (sulfonamide)]; Celecoxib; Levaquin [levofloxacin]; Orencia [abatacept]; Septra [bactrim]; Sulfa antibiotics; Sulfamethoxazole-trimethoprim; Tramadol; Valdecoxib; Adhesive tape; and Antihistamines, chlorpheniramine-type  [antihistamines, chlorpheniramine-type]  Objective   /79   Pulse 67   SpO2  98%   Constitutional: Well developed, well nourished, appears stated age. No acute distress.  Gait is steady, antalgic favoring her left leg, uses a rolling walker  HEENT: Head atraumatic, normocephalic. Eyes without conjunctival injection or jaundice. Neck supple.   Skin: No obvious rash, lesions, or petechiae of exposed skin.   Extremities: Peripheral pulses intact. No clubbing, cyanosis, or edema. Moves all extremities.  Psychiatric/mental status: Alert, without lethargy or stupor. Speech fluent. Appropriate affect. Mood normal. Able to follow commands without difficulty.   Musculoskeletal exam: Normal bulk and tone. Unremarkable spinal curvature. BLE strength and sensation grossly intact. Has pain with movement of her left leg. Difficulty with transitional movements.   Significant Results and Procedures    Imaging:  Copied from [facility] note:      Copied from [facility] Note    Labs:  Creatinine   Date Value Ref Range Status   09/04/2024 0.67 0.51 - 0.95 mg/dL Final     AST   Date Value Ref Range Status   09/04/2024 31 0 - 45 U/L Final     ALT   Date Value Ref Range Status   09/04/2024 27 0 - 50 U/L Final         Assessment & Plan   SI joint pain  Chronic midline low back pain with left-sided sciatica  DDD (degenerative disc disease), lumbar  Spinal stenosis of lumbar region without neurogenic claudication  Lower back and left SI joint pain is better, but as this improved she noticed that her left hip has been severely painful.     Left Hip Pain  Left Hip OA  WE discussed the use of opioids for pain from OA. Given the relatively high incidence of adverse effects associated with opioid analgesics, we avoid the use of opioids whenever possible. In addition to safety concerns, there is also evidence that opioids lack clinically meaningful benefits for knee and hip OA patients. Furthermore, I have advised her that opioid tolerance increases the risk of experiencing severe pain after surgery and can slow  recovery. Advised stopping the use of naproxen and ibuprofen. I told her that she absolutely should not take prescribed medications (diclofenac and hydrocodone) at an increased dose or frequency than prescribed. She was instructed to stop NSAID 7 days before surgery.    Prescribed #30 tablets of Norco 5/325, 1/2 to 1 tablet per day, no more than 1 tablet per day; diclofenac EC 50 mg BID #60.     Rheumatoid arthritis involving right hand with positive rheumatoid factor (H)  Established chronic issue that was considered when making medical decisions; no current exacerbations or new concerns.     SHERRY (obstructive sleep apnea)-severe (AHI 35)  Established chronic issue that was considered when making medical decisions; no current exacerbations or new concerns. Higher risk for adverse event with opioid use.     Kayla Mathur, CNP-BC, PMGT-BC, AP-PMN  [facility]    BILLING TIME DOCUMENTATION: The total TIME spent on this patient on the date of the encounter/appointment was 42 minutes.   TOTAL TIME INCLUDED  Time spent preparing to see the patient by reviewing available medical information in the patient's medical record, including relevant provider notes, laboratory work, and imaging 8 minutes  Time spent face to face (or over the phone) with the patient 26 minutes   Time spent documenting clinical information in Epic 8 minutes

## 2024-09-12 ENCOUNTER — VIRTUAL VISIT (OUTPATIENT)
Dept: PSYCHOLOGY | Facility: CLINIC | Age: 84
End: 2024-09-12
Payer: COMMERCIAL

## 2024-09-12 DIAGNOSIS — F33.1 MODERATE EPISODE OF RECURRENT MAJOR DEPRESSIVE DISORDER (H): Primary | ICD-10-CM

## 2024-09-12 PROCEDURE — 90837 PSYTX W PT 60 MINUTES: CPT | Mod: 95

## 2024-09-12 NOTE — PROGRESS NOTES
[facility]                                     Progress Note    Patient Name: Ginger Marshall  Date: 9/12/24         Service Type: Individual      Session Start Time: 2:00 pm  Session End Time: 2:53 pm     Session Length: 53 min    Session #: 19    Answers submitted by the patient for this visit:    Attendees: Client attended alone    Service Modality:  Video Visit:      Provider verified identity through the following two step process.  Patient provided:  Patient is known previously to provider    Telemedicine Visit: The patient's condition can be safely assessed and treated via synchronous audio and visual telemedicine encounter.      Reason for Telemedicine Visit: Patient convenience (e.g. access to timely appointments / distance to available provider)    Originating Site (Patient Location): Patient's home    Distant Site (Provider Location): Provider Remote Setting- Home Office    Consent:  The patient/guardian has verbally consented to: the potential risks and benefits of telemedicine (video visit) versus in person care; bill my insurance or make self-payment for services provided; and responsibility for payment of non-covered services.     Patient would like the video invitation sent by:  My Chart    Mode of Communication:  Video Conference via [employer]well    Distant Location (Provider):  Off-site    As the provider I attest to compliance with applicable laws and regulations related to telemedicine.    DATA  Interactive Complexity: No     Crisis: No    Extended Session (53+ minutes): PROLONGED SERVICE IN THE OUTPATIENT SETTING REQUIRING DIRECT (FACE-TO-FACE) PATIENT CONTACT BEYOND THE USUAL SERVICE:    - Longer session due to limited access to mental health appointments and necessity to address patient's distress / complexity  Interactive Complexity: No  Crisis: No      Progress Since Last Session (Related to Symptoms / Goals / Homework):   Symptoms: Improving mood , low energy    Homework:  Partially completed      Episode of Care Goals: Minimal progress - ACTION (Actively working towards change); Intervened by reinforcing change plan / affirming steps taken     Current / Ongoing Stressors and Concerns:  Second hip replacement in Oct-current debilitating pain. Daughter providing in home care. Receiving [facility] for support, impatience with responses. Low quality sleep, even with CPAP (comes unhooked-waking up tired, nap in afternoon. Reduced energy focus on committees,  socializing with various [employer] groups.   Ongoing: Grieving loss of adult son who was blind and lived alone, fell and passed away, second loss of an adult/child. Regrets about parenting.      Treatment Objective(s) Addressed in This Session:    Practice boundaries and radical acceptance of reality regarding sister, sons, reality of accident  Patient will Increase interest, engagement, and pleasure in doing things  Decrease frequency and intensity of feeling down, depressed, hopeless  Improve quantity and quality of night time sleep / decrease daytime naps  Feel less tired and more energy during the day   Improve diet, appetite, mindful eating, and / or meal planning  Identify negative self-talk and behaviors: challenge core beliefs, myths, and actions  Improve concentration, focus, and mindfulness in daily activities   Feel less fidgety, restless or slow in daily activities / interpersonal interactions.     Intervention:  Supportive therapy: Provided active listening and validation. Reviewed grounding/mindfulness skills  EMDR: Reinforced calm safe state practice    Motivational Interviewing-MAINTAIN  Target Behavior:  radical acceptance of reality, gratitude , jena practices  Proactive communication with providers, [facility], attend appointments    MI Intervention: Expressed Empathy/Understanding, Supported Autonomy, Collaboration, Evocation and Open-ended questions     Change Talk Expressed by the Patient: Desire to  change Ability to change Reasons to change Activation Taking steps    Provider Response to Change Talk: E - Evoked more info from patient about behavior change, A - Affirmed patient's thoughts, decisions, or attempts at behavior change, and R - Reflected patient's change talk    Assessments completed prior to visit:  LAURA  12/5/22  The following assessments were completed by patient for this visit:  PHQ2:       2/27/2023     8:50 AM 2/24/2023     6:48 AM 2/20/2023     5:39 PM 1/15/2023    11:54 AM 11/16/2022     1:33 PM 8/5/2022     8:11 AM 7/6/2022     4:18 PM   PHQ-2 ( 1999 [employer])   Q1: Little interest or pleasure in doing things 0 1 1 1 1 3    Q2: Feeling down, depressed or hopeless 0 1 1 1 1 0    PHQ-2 Score 0 2 2 2 2 3    Q1: Little interest or pleasure in doing things  Several days Several days Several days Several days Nearly every day    Q2: Feeling down, depressed or hopeless  Several days Several days Several days Several days Not at all    PHQ-2 Score  2 2 2 2 3 Incomplete     PHQ9:       3/19/2024    10:05 AM 4/2/2024     9:02 PM 4/21/2024     1:24 PM 6/25/2024     7:12 AM 7/7/2024     7:30 AM 8/12/2024     1:53 PM 8/19/2024     9:41 AM   PHQ-9 SCORE   PHQ-9 Total Score MyChart 8 (Mild depression) 3 (Minimal depression) 5 (Mild depression) 9 (Mild depression) 6 (Mild depression) 10 (Moderate depression) 11 (Moderate depression)   PHQ-9 Total Score 8 3 5 9 6 10 11     Patient Health Questionnaire (Submitted on 9/26/2023)  If you checked off any problems, how difficult have these problems made it for you to do your work, take care of things at home, or get along with other people?: Somewhat difficult  PHQ9 TOTAL SCORE: 4  GAD2:       11/19/2023    10:24 AM 1/15/2024    10:35 AM 2/16/2024     8:34 AM 3/13/2024     6:28 PM 4/21/2024     1:25 PM 7/7/2024     7:31 AM 8/19/2024     9:42 AM   SPRING-2   Feeling nervous, anxious, or on edge 0 1 1 1 0 0 0   Not being able to stop or control worrying 0 0 0 0 0 0 0    SPRING-2 Total Score 0 1 1 1 0 0 0     GAD7:       7/6/2022     4:18 PM 8/5/2022     8:11 AM 11/16/2022     1:33 PM 6/5/2023     2:31 PM 2/28/2024    10:14 AM 4/2/2024     9:05 PM 5/31/2024     9:14 AM   SPRING-7 SCORE   Total Score 2 (minimal anxiety) 5 (mild anxiety) 2 (minimal anxiety)  5 (mild anxiety) 1 (minimal anxiety) 1 (minimal anxiety)   Total Score 2 5 2 3 5 1 1     CAGE-AID:       12/5/2022     9:28 AM   CAGE-AID Total Score   Total Score 0   Total Score MyChart 0 (A total score of 2 or greater is considered clinically significant)     PROMIS 10-Global Health (only subscores and total score):       12/5/2022     9:28 AM 3/12/2023    10:38 AM 8/23/2023     8:34 AM 12/14/2023     6:28 AM 3/13/2024     6:31 PM 7/7/2024     7:34 AM 9/1/2024    11:20 AM   PROMIS-10 Scores Only   Global Mental Health Score 8    8 8    8 12 13 11 13 10   Global Physical Health Score 13    13 11    11 12 12 11 12 9   PROMIS TOTAL - SUBSCORES 21    21 19    19 24 25 22 25 19     [place] Suicide Severity Rating Scale (Lifetime/Recent)      12/5/2022    11:00 AM   [place] Suicide Severity Rating (Lifetime/Recent)   Q1 Wished to be Dead (Past Month) no   Q2 Suicidal Thoughts (Past Month) no   Q3 Suicidal Thought Method no   Q4 Suicidal Intent without Specific Plan no   Q5 Suicide Intent with Specific Plan no   Q6 Suicide Behavior (Lifetime) no   Level of Risk per Screen low risk         ASSESSMENT: Current Emotional / Mental Status (status of significant symptoms):   Risk status (Self / Other harm or suicidal ideation)   Patient denies current fears or concerns for personal safety.   Patient denies current or recent suicidal ideation or behaviors.   Patient denies current or recent homicidal ideation or behaviors.   Patient denies current or recent self injurious behavior or ideation.   Patient denies other safety concerns.   Patient reports there has been no change in risk factors since their last session.     Patient reports there  has been no change in protective factors since their last session.     Recommended that patient call 911 or go to the local ED should there be a change in any of these risk factors.     Appearance:   Appropriate    Eye Contact:   Good    Psychomotor Behavior: Normal    Attitude:   Pleasant Attentive   Orientation:   All   Speech    Rate / Production: Normal     Volume:  Normal    Mood:    Normal depressed   Affect:    Appropriate    Thought Content:  Clear    Thought Form:  Coherent  Logical    Insight:    Good      Medication Review:   No changes to current psychiatric medication(s)     Medication Compliance:   Yes     Changes in Health Issues:   None reported     Chemical Use Review:   Substance Use: Chemical use reviewed, no active concerns identified      Tobacco Use: No current tobacco use.      Diagnosis:  1. Moderate episode of recurrent major depressive disorder (H)        Collateral Reports Completed:   Not Applicable    PLAN: (Patient Tasks / Therapist Tasks / Other)   Embrace radical acceptance and boundaries. Use proactive communication with providers, family, try grounding /mindfulness, loving kindness toward self, use EMDR cue word CHAIR    Rocío Arenas [facility] 9/12/2024                                                                                                  Individual Treatment Plan    Patient's Name: Ginger Marshall  YOB: 1940    Date of Creation: 2/8/23  Date Treatment Plan Last Reviewed/Revised:   8/19/24    DSM5 Diagnoses: 296.31 (F33.0) Major Depressive Disorder, Recurrent Episode, Mild With anxious distress  Psychosocial / Contextual Factors: aging, losses, generational trauma  PROMIS (reviewed every 90 days):   21 12/5/22    Referral / Collaboration:  Referral to another professional/service is not indicated at this time..    Anticipated number of session for this episode of care: 6-9 sessions  Anticipation frequency of session: Every other week  Anticipated Duration of  each session: 53 or more minutes  Treatment plan will be reviewed in 90 days or when goals have been changed.     MeasurableTreatment Goal(s) related to diagnosis / functional impairment(s)  Goal 1: Patient will experience an improvement in mood and functioning as evidenced by assessment scores, self report and clinician observation    I will know I've met my goal when things feel better (paraphrase).      Objective #A (Patient Action)    Patient will increase understanding of steps in the grief process   Talk to others about losses  Use prayer practices and jena community to support well being.   Practice boundaries and radical acceptance of reality regarding sister sons, reality of accident   Engage in social activities at [facility]  Status: 8/19/24    Intervention(s)  Therapist will teach CBT, DBT  and support grief processing skills, prayer practices .    Objective #B  Patient will Increase interest, engagement, and pleasure in doing things  Decrease frequency and intensity of feeling down, depressed, hopeless  Improve quantity and quality of night time sleep / decrease daytime naps  Feel less tired and more energy during the day   Improve diet, appetite, mindful eating, and / or meal planning  Identify negative self-talk and behaviors: challenge core beliefs, myths, and actions  Improve concentration, focus, and mindfulness in daily activities   Feel less fidgety, restless or slow in daily activities / interpersonal interactions.  Status: 8/19/2024    Intervention(s)  Therapist will  teach cbt, dbt,MI, mindfulness and behavioral activation and assign homework .      Patient has reviewed and agreed to the above plan.      Rocío Arenas, HECTOR  8/19/2024

## 2024-09-17 ENCOUNTER — THERAPY VISIT (OUTPATIENT)
Dept: PHYSICAL THERAPY | Facility: CLINIC | Age: 84
End: 2024-09-17
Payer: COMMERCIAL

## 2024-09-17 DIAGNOSIS — G89.29 CHRONIC MIDLINE LOW BACK PAIN WITH LEFT-SIDED SCIATICA: Primary | ICD-10-CM

## 2024-09-17 DIAGNOSIS — M54.42 CHRONIC MIDLINE LOW BACK PAIN WITH LEFT-SIDED SCIATICA: Primary | ICD-10-CM

## 2024-09-17 DIAGNOSIS — F33.42 MAJOR DEPRESSIVE DISORDER, RECURRENT EPISODE, IN FULL REMISSION (H): ICD-10-CM

## 2024-09-17 PROCEDURE — 97112 NEUROMUSCULAR REEDUCATION: CPT | Mod: GP | Performed by: PHYSICAL THERAPIST

## 2024-09-17 PROCEDURE — 97110 THERAPEUTIC EXERCISES: CPT | Mod: GP | Performed by: PHYSICAL THERAPIST

## 2024-09-17 RX ORDER — BUPROPION HYDROCHLORIDE 150 MG/1
150 TABLET ORAL EVERY MORNING
Qty: 30 TABLET | Refills: 0 | Status: SHIPPED | OUTPATIENT
Start: 2024-09-17

## 2024-09-18 DIAGNOSIS — F33.1 MODERATE EPISODE OF RECURRENT MAJOR DEPRESSIVE DISORDER (H): ICD-10-CM

## 2024-09-18 DIAGNOSIS — F41.9 ANXIETY: ICD-10-CM

## 2024-09-18 RX ORDER — SERTRALINE HYDROCHLORIDE 100 MG/1
100 TABLET, FILM COATED ORAL DAILY
Qty: 30 TABLET | Refills: 0 | Status: SHIPPED | OUTPATIENT
Start: 2024-09-18

## 2024-09-22 ENCOUNTER — MYC MEDICAL ADVICE (OUTPATIENT)
Dept: PALLIATIVE MEDICINE | Facility: CLINIC | Age: 84
End: 2024-09-22

## 2024-09-22 DIAGNOSIS — M16.12 PRIMARY OSTEOARTHRITIS OF LEFT HIP: ICD-10-CM

## 2024-09-22 DIAGNOSIS — M25.552 HIP PAIN, LEFT: ICD-10-CM

## 2024-09-23 NOTE — TELEPHONE ENCOUNTER
Will leave encounter open for patient response/chart review by nursing.     ----------------Mychart Below from pt----------------  The pain is definitely back since the injection.   I believe it is what most of my pain is from,  not the hip.   It makes my whole leg ache down to my foot.   I think it was Alise I was taking to and figured it is coming from L3-4,  and that is where I have hardware from previous surgery. Is there something we can do about this?     Ginger [email or web address]   [phone]        --------------Mychart below response to pt----------------  Karan Miles,     It looks like your last injection was your SI joint (August) to provide some relief for your low back pain. It looks like that seemed to be helpful for the back pain and Left SI pain, at least initially. Is that correct? Is that the back pain that has returned? It was noted that you noticed that your left hip has been more painful since the back/SI pain was relieved. Of note, with you having an upcoming hip surgery your surgeon may have reservations about additional steroids within a certain timeframe to your surgery as it can impede healing.    I am not sure who Alise would be, perhaps physical therapy? PT's do not necessarily make diagnosis but I can have Kayla review to see what her thoughts may be.     Carmen BONILLAN, RN Care Coordinator  [facility]  Pain Management

## 2024-09-24 ENCOUNTER — THERAPY VISIT (OUTPATIENT)
Dept: PHYSICAL THERAPY | Facility: CLINIC | Age: 84
End: 2024-09-24
Payer: COMMERCIAL

## 2024-09-24 DIAGNOSIS — G89.29 CHRONIC MIDLINE LOW BACK PAIN WITH LEFT-SIDED SCIATICA: Primary | ICD-10-CM

## 2024-09-24 DIAGNOSIS — M54.42 CHRONIC MIDLINE LOW BACK PAIN WITH LEFT-SIDED SCIATICA: Primary | ICD-10-CM

## 2024-09-24 PROCEDURE — 97110 THERAPEUTIC EXERCISES: CPT | Mod: GP | Performed by: PHYSICAL THERAPIST

## 2024-09-24 PROCEDURE — 97112 NEUROMUSCULAR REEDUCATION: CPT | Mod: GP | Performed by: PHYSICAL THERAPIST

## 2024-09-24 RX ORDER — HYDROCODONE BITARTRATE AND ACETAMINOPHEN 5; 325 MG/1; MG/1
.5-1 TABLET ORAL DAILY PRN
Qty: 21 TABLET | Refills: 0 | Status: SHIPPED | OUTPATIENT
Start: 2024-09-24 | End: 2024-11-20

## 2024-09-24 NOTE — TELEPHONE ENCOUNTER
Called pharmacy. Confirmed #7 sold 09/11/24.     Routing to provider to review prepped prescription      ----------------Mychart Below from pt----------------  Yes it is that same spot in my back with the last injection was.   And it is going all down my leg,  knee and foot. But it is most severe most of the time in the low back.  I guess I just needed Kayla to know about this.  And yes, Alise is p. T.  She didn't make a diagnosis. We were just discussing it and thinking that maybe that was what the problem was.     Also about the hydrocodone, The pharmacist said she needed to get a preauthorization for it then she would have to send another prescription to the pharmacy. They did give me 7 of which I have 2 left. It would be nice to have a few more.        --------------Mychart below response to pt----------------  Valentin Miles,     With the proximity to your upcoming surgery, any steroid injection has the potential to delay healing. Kayla recommends continuing conservative measures like heat, ice, working with PT and perhaps some OTC topicals in the meantime.   I was able to look into your hydrocodone. In [place], when you fill an opioid for the first time sometimes your insurance will only allow for a 7 day supply. This is why you were only provided with 7 at the pharmacy. Kayla was agreeable to send in another prescription for the remaining supply (21 tablets). She advises that you use this supply as needed to get you to your upcoming surgery. We will get that sent to your pharmacy today. Thanks!    Carmen POLLARD, RN Care Coordinator  [facility]  Pain Management    When responding to this message, please allow 24-48 business hours for a reply.  If you need sooner assistance please call: [facility] at [phone] between the hours of 8:00AM and 4:30PM Monday through Friday.    Note that MyChart is not intended for emergencies, detailed provider communication, or in lieu of an office visit. Medication  is not typically adjusted over MyChart communication.

## 2024-09-24 NOTE — TELEPHONE ENCOUNTER
[place] Prescription Monitoring Program database was checked and prescription was e-prescribed to their preferred pharmacy.  Agree with nursing note.      Signed Prescriptions:                        Disp   Refills    HYDROcodone-acetaminophen (NORCO) 5-325 MG*21 tab*0        Sig: Take 0.5-1 tablets by mouth daily as needed for           severe pain (Do not take more than one tablet per           day.).  Authorizing Provider: MARIANNA TENORIO

## 2024-09-26 ENCOUNTER — IMMUNIZATION (OUTPATIENT)
Dept: FAMILY MEDICINE | Facility: CLINIC | Age: 84
End: 2024-09-26
Payer: COMMERCIAL

## 2024-09-26 DIAGNOSIS — Z23 NEED FOR PROPHYLACTIC VACCINATION AND INOCULATION AGAINST INFLUENZA: Primary | ICD-10-CM

## 2024-09-26 PROCEDURE — G0008 ADMIN INFLUENZA VIRUS VAC: HCPCS

## 2024-09-26 PROCEDURE — 90662 IIV NO PRSV INCREASED AG IM: CPT

## 2024-09-27 ENCOUNTER — OFFICE VISIT (OUTPATIENT)
Dept: FAMILY MEDICINE | Facility: CLINIC | Age: 84
End: 2024-09-27
Payer: COMMERCIAL

## 2024-09-27 VITALS
RESPIRATION RATE: 16 BRPM | SYSTOLIC BLOOD PRESSURE: 145 MMHG | BODY MASS INDEX: 35.03 KG/M2 | WEIGHT: 218 LBS | DIASTOLIC BLOOD PRESSURE: 79 MMHG | OXYGEN SATURATION: 96 % | HEIGHT: 66 IN | TEMPERATURE: 97.8 F | HEART RATE: 69 BPM

## 2024-09-27 DIAGNOSIS — I05.0 RHEUMATIC MITRAL STENOSIS: ICD-10-CM

## 2024-09-27 DIAGNOSIS — Z01.818 PRE-OP EXAM: Primary | ICD-10-CM

## 2024-09-27 DIAGNOSIS — D63.8 ANEMIA OF CHRONIC DISEASE: ICD-10-CM

## 2024-09-27 DIAGNOSIS — D50.8 IRON DEFICIENCY ANEMIA REFRACTORY TO IRON THERAPY: ICD-10-CM

## 2024-09-27 DIAGNOSIS — M25.552 HIP PAIN, LEFT: ICD-10-CM

## 2024-09-27 LAB — HGB BLD-MCNC: 11.6 G/DL (ref 11.7–15.7)

## 2024-09-27 PROCEDURE — 36415 COLL VENOUS BLD VENIPUNCTURE: CPT | Performed by: STUDENT IN AN ORGANIZED HEALTH CARE EDUCATION/TRAINING PROGRAM

## 2024-09-27 PROCEDURE — 93000 ELECTROCARDIOGRAM COMPLETE: CPT | Performed by: STUDENT IN AN ORGANIZED HEALTH CARE EDUCATION/TRAINING PROGRAM

## 2024-09-27 PROCEDURE — 99214 OFFICE O/P EST MOD 30 MIN: CPT | Performed by: STUDENT IN AN ORGANIZED HEALTH CARE EDUCATION/TRAINING PROGRAM

## 2024-09-27 PROCEDURE — 85018 HEMOGLOBIN: CPT | Performed by: STUDENT IN AN ORGANIZED HEALTH CARE EDUCATION/TRAINING PROGRAM

## 2024-09-27 ASSESSMENT — PATIENT HEALTH QUESTIONNAIRE - PHQ9: SUM OF ALL RESPONSES TO PHQ QUESTIONS 1-9: 8

## 2024-09-27 ASSESSMENT — PAIN SCALES - GENERAL: PAINLEVEL: EXTREME PAIN (8)

## 2024-09-27 NOTE — PROGRESS NOTES
Pre-op does not need to be faxed per pre-op notes.     Lucille Rob   [facility]

## 2024-09-27 NOTE — PATIENT INSTRUCTIONS
Hold all supplements 14 days prior to surgery  Hold all NSAIDs 7-10 days prior to surgery  Hold all topical creams on day of surgery    Continue sertraline, pregabalin, pantoprazole, methotrexate, bupropion, statin, tylenol    Hold diclofenac 7 days prior, rinvoq 4 days prior; hold loratadine on day of surgery.

## 2024-09-27 NOTE — PROGRESS NOTES
Preoperative Evaluation  [facility]  [address] [address] [address]  Phone: [phone]  Primary Provider: Duy Leyva MD  Pre-op Performing Provider: Duy Leyva MD  Sep 27, 2024         9/22/2024   Surgical Information   What procedure is being done? Arthroplasty, acetabular and proximal femoral prosthetic replacement w/ or w/o autograft or allograft   Facility or Hospital where procedure/surgery will be performed: [facility]   Who is doing the procedure / surgery? Eugenio Jeffries   Date of surgery / procedure: October 22nd   Time of surgery / procedure: TBD   Where do you plan to recover after surgery? at home with Home Care      Fax number for surgical facility: Note does not need to be faxed, will be available electronically in Epic.    Assessment & Plan     The proposed surgical procedure is considered INTERMEDIATE risk.    Pre-op exam  Hip pain, left  - Hemoglobin  - EKG 12-lead complete w/read - Clinics  - Hemoglobin    Anemia of chronic disease  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  Hemoglobin very minimally decreased at 11.6. Has completed workup in past. Stable. At goal for surgery with risk of moderate blood loss.  - Hemoglobin    Rheumatic mitral stenosis  Asymptomatic. EKG for surgery. Unremarkable.   - EKG 12-lead complete w/read - Clinics    Risks and Recommendations  The patient has the following additional risks and recommendations for perioperative complications:  Obstructive Sleep Apnea:   Wears CPAP, will bring to hospital     Antiplatelet or Anticoagulation Medication Instructions   - Patient is on no antiplatelet or anticoagulation medications.    Additional Medication Instructions  Hold all supplements 14 days prior to surgery  Hold all NSAIDs 7-10 days prior to surgery  Hold all topical creams on day of surgery    Continue sertraline, pregabalin, pantoprazole, methotrexate, bupropion, statin, tylenol, valacyclovir  Hold  diclofenac 7 days prior, rinvoq 4 days prior; hold loratadine on day of surgery.    Recommendation  Approval given to proceed with proposed procedure, without further diagnostic evaluation.    Follow up in 6 months for annual physical    Duybennett Leyva MD  [facility]  9/27/2024    Alejandro Miles is a 84 year old, presenting for the following:    Pre-Op Exam        9/27/2024     8:28 AM   Additional Questions   Roomed by elba zelaya   Accompanied by self         9/27/2024     8:28 AM   Patient Reported Additional Medications   Patient reports taking the following new medications na     Via the Health Maintenance questionnaire, the patient has reported the following services have been completed -Eye Exam: [facility] 2024-08-26, this information has been sent to the abstraction team.    HPI related to upcoming procedure:   Hip pain with upcoming hip surgery        9/22/2024   Pre-Op Questionnaire   Have you ever had a heart attack or stroke? No   Have you ever had surgery on your heart or blood vessels, such as a stent placement, a coronary artery bypass, or surgery on an artery in your head, neck, heart, or legs? No   Do you have chest pain with activity? No   Do you have a history of heart failure? No   Do you currently have a cold, bronchitis or symptoms of other infection? No   Do you have a cough, shortness of breath, or wheezing? No   Do you or anyone in your family have previous history of blood clots? No   Do you or does anyone in your family have a serious bleeding problem such as prolonged bleeding following surgeries or cuts? No   Have you ever had problems with anemia or been told to take iron pills? (!) YES   Have you had any abnormal blood loss such as black, tarry or bloody stools, or abnormal vaginal bleeding? No   Have you ever had a blood transfusion? (!) YES, after some surgeries, specifically prior RIGHT hip replacement   Have you ever had a transfusion reaction? No   Are  you willing to have a blood transfusion if it is medically needed before, during, or after your surgery? Yes   Have you or any of your relatives ever had problems with anesthesia? (!) YES, patient with N/V after anesthesia years ago, has not occurred with recent surgeries.   Do you have sleep apnea, excessive snoring or daytime drowsiness? (!) YES   Do you have a CPAP machine? Yes   Do you have any artifical heart valves or other implanted medical devices like a pacemaker, defibrillator, or continuous glucose monitor? No   Do you have artificial joints? (!) YES, R hip, bilateral knee replacements, R two metacarpal joints   Are you allergic to latex? No      Health Care Directive  Patient has a Health Care Directive on file    Preoperative Review of    reviewed - controlled substances reflected in medication list.    Status of Chronic Conditions:  ANEMIA - Patient has a recent history of mild anemia, which has not been symptomatic.   Longstanding hx of DENY (low normal MCV) since at least 2006 with normal subsequent EGD (neg H pylori)/colonoscopy in past (2011).  On iron supplementation intermittently since that time.  However, uptrending MCV towards macrocytosis since 2021 in association with low normal vitamin B12 level. Since restarted B12 supplement.  2024: Peripheral smear with normal retic count and unremarkable microscopy. Hemoglobin back into normal range.    SLEEP PROBLEM - Patient has a longstanding history of snoring. Patient has tried OTC medications with limited success.     Patient Active Problem List    Diagnosis Date Noted    Lumbosacral radiculopathy 09/11/2024     Priority: Medium    Lumbosacral spondylosis without myelopathy 09/11/2024     Priority: Medium    Muscle pain 09/11/2024     Priority: Medium    Post-laminectomy syndrome 09/11/2024     Priority: Medium    Herpes simplex vulvovaginitis 02/29/2024     Priority: Medium     New, frequent outbreaks in setting of immunocompromise  (methotrexate and upadacitinib tx for RA). Plan to start suppressive therapy for patient. Consider discussion with Rheumatologist.        Unspecified fall, subsequent encounter 02/15/2023     Priority: Medium    Muscle weakness (generalized) 02/15/2023     Priority: Medium    Other abnormalities of gait and mobility 02/15/2023     Priority: Medium    Rheumatoid arthritis with rheumatoid factor, unspecified (H) 02/15/2023     Priority: Medium    Thoracic spine pain 11/08/2022     Priority: Medium    Immunocompromised (H) 10/25/2022     Priority: Medium     2/2 RA pharmacotherapy with Upadacitinib, methotrexate      Morbid obesity (H) 08/12/2022     Priority: Medium    History of bisphosphonate therapy 10/13/2021     Priority: Medium    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 09/30/2021     Priority: Medium     Added automatically from request for surgery [id number]      Rheumatic mitral stenosis 05/26/2021     Priority: Medium    Moderate episode of recurrent major depressive disorder (H) 05/10/2021     Priority: Medium    Chronic right shoulder pain 09/16/2020     Priority: Medium    DDD (degenerative disc disease), lumbar 07/16/2019     Priority: Medium     Following with pain clinic, currently PT.  Was seen through [facility] in past with multiple LESIs. Last lumbar MRI in 2021 revealing severe DDD and moderate central stenosis. Does have decreased R hip flexion strength (4/5). Already completed at least 3 months of conservative treatments. Face-to-face visit today for lift chair and adjustable bed.     Restarting pregabalin for relief in 2024      Anxiety 06/20/2017     Priority: Medium    Allergic state, subsequent encounter 03/01/2017     Priority: Medium    Prediabetes 04/29/2016     Priority: Medium    MGD (meibomian gland dysfunction) 01/19/2016     Priority: Medium    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 01/04/2016     Priority: Medium     Longstanding hx of DENY (low  normal MCV) since at least 2006 with normal subsequent EGD (neg H pylori)/colonoscopy in past (2011).   On iron supplementation intermittently since that time.     However, uptrending MCV towards macrocytosis since 2021 in association with low normal vitamin B12 level. Since restarted B12 supplement.  2024: Peripheral smear with normal retic count and unremarkable microscopy. Hemoglobin back into normal range.      Stenosis, spinal, lumbar 07/07/2015     Priority: Medium    Cornea guttata, ou 05/28/2015     Priority: Medium    Conjunctival concretions 05/28/2015     Priority: Medium    Sleep apnea 01/24/2014     Priority: Medium     Polysomnography 1/20/2014: (214.0 lbs). The lowest oxygen saturation was 88.0%. Apnea/Hypopnea Index was 35.4 events per hour.  The REM AHI was N/A.  The RERA index was 19.7 per hour.   The RDI was 55.1. On CPAP optimal pressure was 9 with an AHI of 0.5 including lateral REM sleep. During the diagnostic portion of the study, PLM index was 99.0 per hour.  During the treatment portion of the study, PLM index was 46.6 per hour.      Neuropathy 07/24/2013     Priority: Medium     Diagnosed based on symptoms in July 24, 2013      Anemia of chronic disease 05/17/2013     Priority: Medium    Hyperlipidemia LDL goal <100 07/23/2011     Priority: Medium    GERD (gastroesophageal reflux disease) 04/12/2011     Priority: Medium     On pantoprazole. Attempting transition to H2B with hx of osteoporosis.      Pulmonary nodule 01/04/2011     Priority: Medium     Ct needed in 10/11      Multinodular goiter      Priority: Medium     IMPRESSION:  1.  Multinodular goiter, similar to previous.  2.  2.7 cm TI-RADS category 3 nodule on the left, not appreciably  changed from previous studies dating back to at least 2018 for biopsy.    Was referred to ENT in 2018:  ENT A/P - Multiple thyroid nodules. One has enlarged in 8 years, but only about 1/2 cm. It was never biopsied and is over 2 cm. I have explained  the importance of a ultrasound-guided FNA of the nodule to rule-out malignancy. However, given it hasn't changed much in 8 years it is likely benign. She wanted to repeat U/S in 6 months instead of biopsy. If it grows, she has pain, troubles swallowing, hoarseness, etc, she should return.      History of colonic polyps      Priority: Medium     tubular adenoma        Rheumatoid arthritis involving right hand with positive rheumatoid factor (H) 06/01/2009     Priority: Medium     On methotrexate and Rinvoq        Osteoporosis 02/27/2008     Priority: Medium     Following with Endocrinology. Complicated with fall in 2023 w/ subsequent Right elbow and left distal radial, ulnar fracture s/p ORIF Left distal radius fracture, ORIF Right olecranon fracture.     Hx of reclast once in 2013, 2014, 2016. 12/12/2018 DEXA ordered by Dr. Vázquez showed osteoporosis.  Repeat DEXA on 2/2/2022 showed osteoporosis; no significant change of the hips; forearm osteoporosis but no previous evaluation of the forearm for comparison.  Reclast was restarted after sufficient drug holiday, with the first dose on 3/18/2021; again received in 3/21/2022 and 3/21/2023.  Plan to recheck DEXA in February 2024.   Chronic illness, stable.      - Reclast once yearly; next due on 3/21/2024  - Continue vitamin D 1000 IU daily  - Continue calcium 1200mg daily from supplement and dietary sources  - Plan to recheck DEXA in February 2024      PVD (POSTERIOR VITREOUS DETACHMENT) OU 01/12/2011     Priority: Low    PXF (PSEUDOEXFOLIATION OF LENS CAPSULE) OD 01/12/2011     Priority: Low    PSEUDOPHAKIA OU 01/07/2011     Priority: Low      Past Medical History:   Diagnosis Date    Acute posthemorrhagic anemia 10/13/2012    Closed fracture of multiple ribs of left side, initial encounter 11/25/2019    Closed fracture of olecranon process of right ulna with routine healing 02/11/2023    Closed fracture of shaft of left ulna, unspecified fracture morphology, initial  encounter 2023    COPD (chronic obstructive pulmonary disease) (H)     no longer occurring    Depressive disorder     Ex-smoker 1999    Gastroenteritis 2020    Gastroenteritis with norovirus    Herniated nucleus pulposus, L3-4 2017    Hip joint replacement by other means 07/10/2008    History of blood transfusion     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    History of total knee replacement 2009    Menopause 1989    late 40's    Migraine 2014    resolved    Multinodular goiter     Other chronic pain     joints    Other closed intra-articular fracture of distal end of left radius, initial encounter 2023    Pelvic fracture (H) 2014    Rheumatic mitral stenosis     Rheumatoid arteritis (H)     S/P lumbar fusion 2017    Sleep apnea     Vitamin B12 deficiency      Past Surgical History:   Procedure Laterality Date    ABDOMEN SURGERY      c sections    ARTHROSCOPY KNEE Left     ARTHROSCOPY KNEE RT/LT  2006    left    BACK SURGERY  2013    disc    BIOPSY BREAST      BREAST SURGERY      lumpectomy 's    BREAST SURGERY      lumpectomy    CATARACT EXTRACTION Bilateral     CATARACT IOL, RT/LT Bilateral 2010 aproximately     SECTION  1966     SECTION  1972    COLONOSCOPY  2009,    COLONOSCOPY      FINGER SURGERY Right 2019    Procedure: DISTAL ULNA RESECTION, RIGHT MIDDLE SILASTIC METACARPALPHALANGEAL EXCHANGE AND RIGHT ELBOW NODULE EXCISION.;  Surgeon: Hilario Redmond MD;  Location: [facility];  Service: Orthopedics    FOOT SURGERY Left     GYN SURGERY  66,72    HAND SURGERY Right     HAND SURGERY Right      ESOPH/GAS REFLUX TEST W NASAL IMPED >1 HR  2012    Procedure:ESOPHAGEAL IMPEDENCE FUNCTION TEST WITH 24 HOUR PH GREATER THAN 1 HOUR; Surgeon:KAYKAY JULIO; Location:[facility]    IR LUMBAR EPIDURAL STEROID INJECTION      IR TRANSLAMINAR EPIDURAL LUMBAR INJ INCL IMAGING  2012    LESI L5-S1  at [facility]    LUMBAR FUSION      OPEN REDUCTION INTERNAL FIXATION ELBOW Right 2/12/2023    Procedure: OPEN REDUCTION INTERNAL FIXATION, RIGHT OLECRANON FRACTURE;  Surgeon: Pili Brock MD;  Location: [facility] OR    OPEN REDUCTION INTERNAL FIXATION WRIST Left 2/12/2023    Procedure: OPEN REDUCTION INTERNAL FIXATION, LEFT DISTAL RADIUS FRACTURE;  Surgeon: Pili Brock MD;  Location:  OR    OPTICAL TRACKING SYSTEM FUSION POSTERIOR SPINE LUMBAR N/A 04/03/2017    Procedure: OPTICAL TRACKING SYSTEM FUSION SPINE POSTERIOR LUMBAR ONE LEVEL;  Surgeon: Anthony Michele MD;  Location: [facility] OR    ORTHOPEDIC SURGERY  1991    left foot surgery    ORTHOPEDIC SURGERY  1995    R MCP surgery    ORTHOPEDIC SURGERY  2007    right knee total replacement    TOTAL HIP ARTHROPLASTY Right     TOTAL KNEE ARTHROPLASTY Left     TOTAL KNEE ARTHROPLASTY Right     ZZC ANESTH,TOTAL HIP ARTHROPLASTY  2010    Rt hip    ZZC HAND/FINGER SURGERY UNLISTED  11/2005    right hand    ZZC TOTAL KNEE ARTHROPLASTY  2006    left     Current Outpatient Medications   Medication Sig Dispense Refill    acetaminophen (TYLENOL) 325 MG tablet Take 2 tablets (650 mg) by mouth every 6 hours as needed for mild pain Max tylenol 3000 mg in 24 hours      albuterol (PROAIR HFA/PROVENTIL HFA/VENTOLIN HFA) 108 (90 Base) MCG/ACT inhaler Inhale 2 puffs into the lungs every 6 hours as needed for shortness of breath, wheezing or cough      atorvastatin (LIPITOR) 40 MG tablet Take 1 tablet (40 mg) by mouth daily 90 tablet 3    buPROPion (WELLBUTRIN XL) 150 MG 24 hr tablet TAKE ONE TABLET BY MOUTH EVERY MORNING 30 tablet 0    CALCIUM CITRATE PO Take 300 mg by mouth daily Take with meal that contains least amount of calcium      carboxymethylcellulose PF (REFRESH PLUS) 0.5 % ophthalmic solution Place 1 drop into both eyes 2 times daily as needed for dry eyes      Cholecalciferol (VITAMIN D-3 PO) Take 2,000 Units by mouth daily      diclofenac (VOLTAREN) 50 MG EC  tablet Take 1 tablet (50 mg) by mouth 2 times daily. 60 tablet 0    Ferrous Gluconate 324 (37.5 Fe) MG TABS Take 1 tablet by mouth daily Started 6/2023      HYDROcodone-acetaminophen (NORCO) 5-325 MG tablet Take 0.5-1 tablets by mouth daily as needed for severe pain (Do not take more than one tablet per day.). 21 tablet 0    ipratropium (ATROVENT) 0.06 % nasal spray Spray 2 sprays into both nostrils 4 times daily as needed for rhinitis (nasal drainage) 15 mL 11    leucovorin (WELLCOVORIN) 5 MG tablet Take 1 tablet (5 mg) by mouth every 7 days . Take 24 hours after taking Methotrexate each week. 13 tablet 2    lidocaine (LIDODERM) 5 % patch Place 1 patch over 12 hours onto the skin every 24 hours. To prevent lidocaine toxicity, patient should be patch free for 12 hrs daily. 30 patch 1    loratadine (CLARITIN) 10 MG tablet Take 1 tablet (10 mg) by mouth 2 times daily      LOTEMAX 0.5 % OINT opthalmic ointment Place 1 Application into both eyes at bedtime.      Methotrexate, PF, (RASUVO) 25 MG/0.5ML autoinjector Inject 0.5 mLs (25 mg) subcutaneously every 7 days. . Hold for signs of infection, and seek medical attention. Take every Thursday. 2 mL 9    olopatadine (PATADAY) 0.2 % ophthalmic solution Place 1 drop into both eyes daily as needed Uses mostly in Spring      pantoprazole (PROTONIX) 40 MG EC tablet TAKE ONE TABLET BY MOUTH EVERY MORNING BEFORE BREAKFAST 90 tablet 0    pregabalin (LYRICA) 50 MG capsule Take 1 capsule (50 mg) by mouth 3 times daily 270 capsule 1    sertraline (ZOLOFT) 100 MG tablet TAKE 1 TABLET BY MOUTH DAILY 30 tablet 0    UNABLE TO FIND MEDICATION NAME: Distilled water for CPAP      upadacitinib ER (RINVOQ) 15 MG tablet Take 1 tablet (15 mg) by mouth daily. . Hold for any infection and seek medical attention. 30 tablet 9    valACYclovir (VALTREX) 500 MG tablet TAKE ONE TABLET BY MOUTH TWICE A  tablet 1    vitamin C (ASCORBIC ACID) 1000 MG TABS Take 1 tablet (1,000 mg) by mouth daily    "   zoledronic Acid (RECLAST) 5 MG/100ML SOLN infusion Inject 5 mg into the vein once Every year (next dose 2023)      hydrocortisone 2.5 % cream Apply topically 2 times daily For up to two weeks 20 g 0    triamcinolone (KENALOG) 0.1 % external ointment Apply topically 2 times daily (Patient not taking: Reported on 2024)         Allergies   Allergen Reactions    Abatacept Other (See Comments)     Severe headaches  Migraine    Celebrex [Roche-2 Inhibitors (Sulfonamide)]      Ineffective    Celecoxib Unknown and Nausea    Levaquin [Levofloxacin] Fatigue     Severe body pain    Orencia [Abatacept]      Headache    Septra [Bactrim]     Sulfa Antibiotics Nausea and Vomiting     \"deathly ill\"  Allergic to everything with Sulfa in it.    Sulfamethoxazole-Trimethoprim Nausea and Nausea and Vomiting    Tramadol Other (See Comments)     Headache  Migraine headache    Valdecoxib Unknown and Nausea     Made me \"very very ill\", might of been \"cramping\"    Adhesive Tape Rash     Plastic tape  Plastic tapes    Antihistamines, Chlorpheniramine-Type  [Antihistamines, Chlorpheniramine-Type] Anxiety and Other (See Comments)        Social History     Tobacco Use    Smoking status: Former     Current packs/day: 0.00     Average packs/day: 1 pack/day for 41.0 years (41.0 ttl pk-yrs)     Types: Cigarettes     Start date: 1958     Quit date: 1999     Years since quittin.7    Smokeless tobacco: Never    Tobacco comments:     former smoker   Substance Use Topics    Alcohol use: Yes     Comment: Occasionally,  usually wine     History   Drug Use No         Review of Systems  Constitutional, neuro, ENT, endocrine, pulmonary, cardiac, gastrointestinal, genitourinary, musculoskeletal, integument and psychiatric systems are negative, except as otherwise noted.    Objective    There were no vitals taken for this visit.   Estimated body mass index is 35.23 kg/m  as calculated from the following:    Height as of 24: 1.664 m " "(5' 5.5\").    Weight as of 9/4/24: 97.5 kg (215 lb).    Physical Exam  GENERAL: healthy, alert and no distress  HEAD: Normocephalic, atraumatic.   EYES: Normal conjunctivae, sclera.   ENT: Normal oropharynx.   NECK: Supple. No lymphadenopathy appreciated. Trachea midline. Thyroid not enlarged, not TTP.  RESP: lungs clear to auscultation - no rales, rhonchi or wheezes  CV: regular rate and rhythm, normal S1 S2, no murmur, click, rub or gallop.  No peripheral swelling noted.   ABDOMEN: soft, no TTP x4 quadrants. No hepatomegaly or masses appreciated. BS normactive.  MSK: no gross musculoskeletal defects noted.  SKIN: no suspicious lesions or rashes.  EXT: Warm and well perfused.   NEURO: CNII-XII grossly intact. No focal deficits.  PSYCH: Groomed, dressed appropriately for weather. Normal mood with consistent affect.     Recent Labs   Lab Test 09/04/24  1011 06/25/24  0932 05/14/24  1001 02/06/24  1036 12/05/23  1413   HGB 11.6* 11.8 12.6   < > 11.9    317 387   < > 379   CR 0.67  --  0.55   < > 0.55   A1C  --   --   --   --  5.4    < > = values in this interval not displayed.      Diagnostics  Labs pending at this time.  Results will be reviewed when available.   EKG required for structural heart disease and not completed in the last 90 days.   Rheumatic mitral stenosis. RA and HTN - CAD risk factors    EKG:  Indication: Preop  Impression: Normal axis. Borderline sinus bradycardia. V6 artifact. Compared to prior EKG, bradycardia is new.     Revised Cardiac Risk Index (RCRI)  The patient has the following serious cardiovascular risks for perioperative complications:   - No serious cardiac risks = 0 points     RCRI Interpretation: 0 points: Class I (very low risk - 0.4% complication rate)    Signed Electronically by: Duy Leyva MD  A copy of this evaluation report is provided to the requesting physician.    "

## 2024-10-02 ENCOUNTER — VIRTUAL VISIT (OUTPATIENT)
Dept: ORTHOPEDICS | Facility: CLINIC | Age: 84
End: 2024-10-02
Payer: COMMERCIAL

## 2024-10-02 DIAGNOSIS — Z96.642 S/P TOTAL LEFT HIP ARTHROPLASTY: Primary | ICD-10-CM

## 2024-10-02 PROCEDURE — 99207 PR NO CHARGE NURSE ONLY: CPT | Mod: 93

## 2024-10-02 NOTE — PROGRESS NOTES
Teaching Flowsheet   Relevant Diagnosis: left hip osteoarthritis   Teaching Topic: left total hip arthroplasty    Patient's  is her daughter, Jamila.  She will plan to recover at her home.  Patient has concerns with transportation to and from physical therapy sessions after surgery. She is interested in resources for this, social work referral placed.  Discussed total joint online class.  Patient will call if they require help for signing up for the total joint zoom class. Patient understands they will need a preop exam within 30 days of date of surgery. Patient understands the expected length of stay and the expectation of outpatient physical therapy. Patient understands the need for an at home  after surgery and need for transportation home.  Discussed dental/non-sterile procedure prophylaxis. Discussed the [facility] Companion service.        Person(s) involved in teaching:   Patient     Motivation Level:  Asks Questions: Yes  Eager to Learn: Yes  Cooperative: Yes  Receptive (willing/able to accept information): Yes  Any cultural factors/Judaism beliefs that may influence understanding or compliance? No  Comments: none     Patient demonstrates understanding of the following:  Reason for the appointment, diagnosis and treatment plan: Yes  Knowledge of proper use of medications and conditions for which they are ordered (with special attention to potential side effects or drug interactions): Yes  Which situations necessitate calling provider and whom to contact: Yes       Teaching Concerns Addressed:   Comments: none     Proper use and care of (medical equip, care aids, etc.): Yes  Nutritional needs and diet plan: Yes  Pain management techniques: Yes  Wound Care: Yes  How and/when to access community resources: Yes     Instructional Materials Used/Given: Preoperative teaching packet, surgical soap x2, dental card, total joint booklet, & welcome letter       Time spent with patient: 45 minutes.

## 2024-10-03 ENCOUNTER — PATIENT OUTREACH (OUTPATIENT)
Dept: CARE COORDINATION | Facility: CLINIC | Age: 84
End: 2024-10-03
Payer: COMMERCIAL

## 2024-10-03 NOTE — LETTER
[facility] CARE COORDINATION  [facility]  October 3, 2024    Ginger Marshall  [address]   [facility] [place] [facility] [address]      Dear Courtney Miles, Community Health Worker at the [facility]: [phone]     I am a clinic community health worker who works with Duy Leyva MD with the [facility]. I wanted to thank you for spending the time to talk with me.  Below is a description of clinic care coordination and how I can further assist you.       The clinic care coordination team is made up of a registered nurse, , financial resource worker and community health worker who understand the health care system. The goal of clinic care coordination is to help you manage your health and improve access to the health care system. Our team works alongside your provider to assist you in determining your health and social needs. We can help you obtain health care and community resources, providing you with necessary information and education. We can work with you through any barriers and develop a care plan that helps coordinate and strengthen the communication between you and your care team.  Our services are voluntary and are offered without charge to you personally.    Please feel free to contact me with any questions or concerns regarding care coordination and what we can offer.      We are focused on providing you with the highest-quality healthcare experience possible.    Sincerely,     Delia MCDANIEL. [facility] Community Health Worker  Ambulatory Care Coordination

## 2024-10-03 NOTE — PROGRESS NOTES
Clinic Care Coordination Contact  Community Health Worker Initial Outreach    CHW Initial Information Gathering:  Referral Source: Specialist  Current living arrangement:: I live alone  Type of residence:: Apartment  Community Resources: [facility], Housekeeping/Chore Agency ([employer], [employer])  Supplies Currently Used at Home: Incontinence Supplies  Equipment Currently Used at Home: walker, standard, walker, tory  Informal Support system:: Children  No PCP office visit in Past Year: No  Transportation means:: Regular car (Currently driving self.)       Patient accepts CC: No, going to discuss transportation assistance with [facility] . Patient will be sent Care Coordination introduction letter for future reference.     CHW was able to connect with the Patient and introduce self/care coordination and intent of call. Patient shares that she would like to get PT in home after her hip replacement, but if she can't, she would like to get transportation to outpatient PT.  Patient discusses that she is on a program through the [facility] that pays for her incontinence supplies and daughter to assist her with housekeeping. Notes daughter's schedule makes it difficult for her to assist with transportation to appointments. Patient notes that she has a meeting with her  to inquire about transportation assistance on Monday.   Asked Patient if she would like to discuss transportation or any other needs with CC RN, however Patient declines as she is already working with  on transportation and has no other CC concerns at this time.   Asked about any financial needs. Patient notes that she is already on the [employer] food program through [place].    Provided Patient with NATALY Valdez's contact number: [phone]     No additional CC needs identified during the time of call.     Delia PALM [facility] Community Health Worker  Ambulatory Care Coordination  Covering for Courtney INGRAM

## 2024-10-03 NOTE — PROVIDER NOTIFICATION
10/03/24 1138   Discharge Planning   Patient/Family Anticipates Transition to home with family  (will stay at daughter's house for first few days after discharge (dtr's home is 1 level) and then go to her apartment (also one level))   Concerns to be Addressed denies needs/concerns at this time   Living Arrangements   People in Home alone   Type of Residence Private Residence   Is your private residence a single family home or apartment? Single family home   Number of Stairs, Within Home, Primary one  (one step to get into home)   Stair Railings, Within Home, Primary none   Once home, are you able to live on one level? Yes   Which level? Main Level   Bathroom Shower/Tub Walk-in shower   Equipment Currently Used at Home walker, rolling;walker, standard;grab bar, tub/shower   Support System   Support Systems Children   Do you have someone available to stay with you one or two nights once you are home? Yes   Medical Clearance   Date of Physical 09/27/24   Clinic Name [facility]   It is recommended that you call and check with any specialty providers before surgery to see if you need surgical clearance.  Do you see any specialty providers outside of your primary care provider? Yes  (has already spoken to Rheumatology and rec'd med hold instructions)   Blood   Known Bleeding Disorder or Coagulopathy No   Does the patient have any Sabianism/cultural preferences related to blood products? No   Education   Has the patient scheduled or completed pre-op total joint education, either in class or online, in the last 12 months?   (has set up zoom class for next week)

## 2024-10-08 ENCOUNTER — VIRTUAL VISIT (OUTPATIENT)
Dept: PSYCHOLOGY | Facility: CLINIC | Age: 84
End: 2024-10-08
Payer: COMMERCIAL

## 2024-10-08 DIAGNOSIS — F33.1 MODERATE EPISODE OF RECURRENT MAJOR DEPRESSIVE DISORDER (H): Primary | ICD-10-CM

## 2024-10-08 PROCEDURE — 90837 PSYTX W PT 60 MINUTES: CPT | Mod: 95

## 2024-10-08 NOTE — PROGRESS NOTES
[facility]                                     Progress Note    Patient Name: Ginger Marshall  Date: 10/8/24         Service Type: Individual      Session Start Time: 10:00 am  Session End Time: 10:53 am     Session Length: 53 min    Session #: 20    Answers submitted by the patient for this visit:    Attendees: Client attended alone    Service Modality:  Video Visit:      Provider verified identity through the following two step process.  Patient provided:  Patient is known previously to provider    Telemedicine Visit: The patient's condition can be safely assessed and treated via synchronous audio and visual telemedicine encounter.      Reason for Telemedicine Visit: Patient convenience (e.g. access to timely appointments / distance to available provider)    Originating Site (Patient Location): Patient's home    Distant Site (Provider Location): Provider Remote Setting- Home Office    Consent:  The patient/guardian has verbally consented to: the potential risks and benefits of telemedicine (video visit) versus in person care; bill my insurance or make self-payment for services provided; and responsibility for payment of non-covered services.     Patient would like the video invitation sent by:  My Chart    Mode of [facility]:  [facility]    Distant Location (Provider):  Off-site    As the provider I attest to compliance with applicable laws and regulations related to telemedicine.    DATA  Interactive Complexity: No     Crisis: No    Extended Session (53+ minutes): PROLONGED SERVICE IN THE OUTPATIENT SETTING REQUIRING DIRECT (FACE-TO-FACE) PATIENT CONTACT BEYOND THE USUAL SERVICE:    - Longer session due to limited access to mental health appointments and necessity to address patient's distress / complexity  Interactive Complexity: No  Crisis: No      Progress Since Last Session (Related to Symptoms / Goals / Homework):   Symptoms: Improving mood , low energy    Homework:  Partially completed      Episode of Care Goals: Minimal progress - ACTION (Actively working towards change); Intervened by reinforcing change plan / affirming steps taken     Current / Ongoing Stressors and Concerns:  Second hip replacement in Oct-current debilitating pain. Daughter providing in home care. Receiving [facility] for support, impatience with responses. Low quality sleep, even with CPAP (comes unhooked-waking up tired, nap in afternoon. Reduced energy focus on committees,  socializing with various Caodaism groups.   Ongoing: Grieving loss of adult son who was blind and lived alone, fell and passed away, second loss of an adult/child. Regrets about parenting.      Treatment Objective(s) Addressed in This Session:    Practice boundaries and radical acceptance of reality regarding sister, sons, reality of accident  Patient will Increase interest, engagement, and pleasure in doing things  Decrease frequency and intensity of feeling down, depressed, hopeless  Improve quantity and quality of night time sleep / decrease daytime naps  Feel less tired and more energy during the day   Improve diet, appetite, mindful eating, and / or meal planning  Identify negative self-talk and behaviors: challenge core beliefs, myths, and actions  Improve concentration, focus, and mindfulness in daily activities   Feel less fidgety, restless or slow in daily activities / interpersonal interactions.     Intervention:  Supportive therapy: Provided active listening and validation. Reviewed grounding/mindfulness skills, coping skills, support seeking actions, acceptance of reality  EMDR: Reinforced calm safe state practice    Motivational Interviewing-MAINTAIN  Target Behavior:  radical acceptance of reality, gratitude , jena practices  Proactive communication with providers, [facility], attend appointments    MI Intervention: Expressed Empathy/Understanding, Supported Autonomy, Collaboration, Evocation and Open-ended  questions     Change Talk Expressed by the Patient: Desire to change Ability to change Reasons to change Activation Taking steps    Provider Response to Change Talk: E - Evoked more info from patient about behavior change, A - Affirmed patient's thoughts, decisions, or attempts at behavior change, and R - Reflected patient's change talk    Assessments completed prior to visit:  LAURA  12/5/22  The following assessments were completed by patient for this visit:  PHQ2:       2/27/2023     8:50 AM 2/24/2023     6:48 AM 2/20/2023     5:39 PM 1/15/2023    11:54 AM 11/16/2022     1:33 PM 8/5/2022     8:11 AM 7/6/2022     4:18 PM   PHQ-2 ( 1999 [employer])   Q1: Little interest or pleasure in doing things 0 1 1 1 1 3    Q2: Feeling down, depressed or hopeless 0 1 1 1 1 0    PHQ-2 Score 0 2 2 2 2 3    Q1: Little interest or pleasure in doing things  Several days Several days Several days Several days Nearly every day    Q2: Feeling down, depressed or hopeless  Several days Several days Several days Several days Not at all    PHQ-2 Score  2 2 2 2 3 Incomplete     PHQ9:       4/21/2024     1:24 PM 6/25/2024     7:12 AM 7/7/2024     7:30 AM 8/12/2024     1:53 PM 8/19/2024     9:41 AM 9/27/2024     8:38 AM 10/7/2024     7:09 PM   PHQ-9 SCORE   PHQ-9 Total Score MyChart 5 (Mild depression) 9 (Mild depression) 6 (Mild depression) 10 (Moderate depression) 11 (Moderate depression)  3 (Minimal depression)   PHQ-9 Total Score 5 9 6 10 11 8 3     Patient Health Questionnaire (Submitted on 9/26/2023)  If you checked off any problems, how difficult have these problems made it for you to do your work, take care of things at home, or get along with other people?: Somewhat difficult  PHQ9 TOTAL SCORE: 4  GAD2:       1/15/2024    10:35 AM 2/16/2024     8:34 AM 3/13/2024     6:28 PM 4/21/2024     1:25 PM 7/7/2024     7:31 AM 8/19/2024     9:42 AM 10/4/2024     6:36 AM   SPRING-2   Feeling nervous, anxious, or on edge 1 1 1 0 0 0 0   Not being able to  stop or control worrying 0 0 0 0 0 0 0   SPRING-2 Total Score 1 1 1 0 0 0 0     GAD7:       7/6/2022     4:18 PM 8/5/2022     8:11 AM 11/16/2022     1:33 PM 6/5/2023     2:31 PM 2/28/2024    10:14 AM 4/2/2024     9:05 PM 5/31/2024     9:14 AM   SPRING-7 SCORE   Total Score 2 (minimal anxiety) 5 (mild anxiety) 2 (minimal anxiety)  5 (mild anxiety) 1 (minimal anxiety) 1 (minimal anxiety)   Total Score 2 5 2 3 5 1 1     CAGE-AID:       12/5/2022     9:28 AM   CAGE-AID Total Score   Total Score 0   Total Score MyChart 0 (A total score of 2 or greater is considered clinically significant)     PROMIS 10-Global Health (only subscores and total score):       12/5/2022     9:28 AM 3/12/2023    10:38 AM 8/23/2023     8:34 AM 12/14/2023     6:28 AM 3/13/2024     6:31 PM 7/7/2024     7:34 AM 9/1/2024    11:20 AM   PROMIS-10 Scores Only   Global Mental Health Score 8    8 8    8 12 13 11 13 10   Global Physical Health Score 13    13 11    11 12 12 11 12 9   PROMIS TOTAL - SUBSCORES 21    21 19    19 24 25 22 25 19     Coleman Suicide Severity Rating Scale (Lifetime/Recent)      12/5/2022    11:00 AM   Coleman Suicide Severity Rating (Lifetime/Recent)   Q1 Wished to be Dead (Past Month) no   Q2 Suicidal Thoughts (Past Month) no   Q3 Suicidal Thought Method no   Q4 Suicidal Intent without Specific Plan no   Q5 Suicide Intent with Specific Plan no   Q6 Suicide Behavior (Lifetime) no   Level of Risk per Screen low risk         ASSESSMENT: Current Emotional / Mental Status (status of significant symptoms):   Risk status (Self / Other harm or suicidal ideation)   Patient denies current fears or concerns for personal safety.   Patient denies current or recent suicidal ideation or behaviors.   Patient denies current or recent homicidal ideation or behaviors.   Patient denies current or recent self injurious behavior or ideation.   Patient denies other safety concerns.   Patient reports there has been no change in risk factors since their  last session.     Patient reports there has been no change in protective factors since their last session.     Recommended that patient call 911 or go to the local ED should there be a change in any of these risk factors.     Appearance:   Appropriate    Eye Contact:   Good    Psychomotor Behavior: Normal    Attitude:   Pleasant Attentive   Orientation:   All   Speech    Rate / Production: Normal     Volume:  Normal    Mood:    Normal depressed   Affect:    Appropriate    Thought Content:  Clear    Thought Form:  Coherent  Logical    Insight:    Good      Medication Review:   No changes to current psychiatric medication(s)     Medication Compliance:   Yes     Changes in Health Issues:   None reported     Chemical Use Review:   Substance Use: Chemical use reviewed, no active concerns identified      Tobacco Use: No current tobacco use.      Diagnosis:  1. Moderate episode of recurrent major depressive disorder (H)        Collateral Reports Completed:   Not Applicable    PLAN: (Patient Tasks / Therapist Tasks / Other)   Embrace radical acceptance and boundaries. Use proactive communication with providers, family, try grounding /mindfulness, loving kindness toward self, use EMDR cue word CHAIR    BUBBA Springer 10/8/2024                                                                                                  Individual Treatment Plan    Patient's Name: Ginger Marshall  YOB: 1940    Date of Creation: 2/8/23  Date Treatment Plan Last Reviewed/Revised:   8/19/24    DSM5 Diagnoses: 296.31 (F33.0) Major Depressive Disorder, Recurrent Episode, Mild With anxious distress  Psychosocial / Contextual Factors: aging, losses, generational trauma  PROMIS (reviewed every 90 days):   21 12/5/22    Referral / Collaboration:  Referral to another professional/service is not indicated at this time..    Anticipated number of session for this episode of care: 6-9 sessions  Anticipation frequency of session:  Every other week  Anticipated Duration of each session: 53 or more minutes  Treatment plan will be reviewed in 90 days or when goals have been changed.     MeasurableTreatment Goal(s) related to diagnosis / functional impairment(s)  Goal 1: Patient will experience an improvement in mood and functioning as evidenced by assessment scores, self report and clinician observation    I will know I've met my goal when things feel better (paraphrase).      Objective #A (Patient Action)    Patient will increase understanding of steps in the grief process   Talk to others about losses  Use prayer practices and jena community to support well being.   Practice boundaries and radical acceptance of reality regarding sister sons, reality of accident   Engage in social activities at [facility]  Status: 8/19/24    Intervention(s)  Therapist will teach CBT, DBT  and support grief processing skills, prayer practices .    Objective #B  Patient will Increase interest, engagement, and pleasure in doing things  Decrease frequency and intensity of feeling down, depressed, hopeless  Improve quantity and quality of night time sleep / decrease daytime naps  Feel less tired and more energy during the day   Improve diet, appetite, mindful eating, and / or meal planning  Identify negative self-talk and behaviors: challenge core beliefs, myths, and actions  Improve concentration, focus, and mindfulness in daily activities   Feel less fidgety, restless or slow in daily activities / interpersonal interactions.  Status: 8/19/2024    Intervention(s)  Therapist will  teach cbt, dbt,MI, mindfulness and behavioral activation and assign homework .      Patient has reviewed and agreed to the above plan.      Rocío Arenas, [facility]  8/19/2024

## 2024-10-09 ENCOUNTER — TELEPHONE (OUTPATIENT)
Dept: FAMILY MEDICINE | Facility: CLINIC | Age: 84
End: 2024-10-09
Payer: COMMERCIAL

## 2024-10-09 NOTE — TELEPHONE ENCOUNTER
Patient Quality Outreach    Patient is due for the following:   Chronic Opioid Use -  Treatment Agreement (CSA) and Urine Drug Screen    Next Steps:   Patient has upcoming appointment, these items will be addressed at that time.      Appt on 3/7/25    Type of outreach:    Chart review performed, no outreach needed.      Questions for provider review:    None           Eileen Duque

## 2024-10-11 ENCOUNTER — OFFICE VISIT (OUTPATIENT)
Dept: ORTHOPEDICS | Facility: CLINIC | Age: 84
End: 2024-10-11
Payer: COMMERCIAL

## 2024-10-11 VITALS — BODY MASS INDEX: 36.32 KG/M2 | WEIGHT: 218 LBS | HEIGHT: 65 IN

## 2024-10-11 DIAGNOSIS — F33.42 MAJOR DEPRESSIVE DISORDER, RECURRENT EPISODE, IN FULL REMISSION (H): ICD-10-CM

## 2024-10-11 DIAGNOSIS — L84 CALLUS OF FOOT: Primary | ICD-10-CM

## 2024-10-11 DIAGNOSIS — M19.071 OSTEOARTHRITIS OF FIRST METATARSOPHALANGEAL (MTP) JOINT OF RIGHT FOOT: ICD-10-CM

## 2024-10-11 DIAGNOSIS — M16.12 PRIMARY OSTEOARTHRITIS OF LEFT HIP: ICD-10-CM

## 2024-10-11 DIAGNOSIS — M05.9 RHEUMATOID ARTHRITIS WITH RHEUMATOID FACTOR, UNSPECIFIED (H): ICD-10-CM

## 2024-10-11 DIAGNOSIS — Z96.641 HISTORY OF RIGHT HIP REPLACEMENT: ICD-10-CM

## 2024-10-11 DIAGNOSIS — E66.01 MORBID OBESITY (H): ICD-10-CM

## 2024-10-11 DIAGNOSIS — D84.9 IMMUNOCOMPROMISED (H): ICD-10-CM

## 2024-10-11 PROCEDURE — 99213 OFFICE O/P EST LOW 20 MIN: CPT | Performed by: PHYSICIAN ASSISTANT

## 2024-10-11 RX ORDER — BUPROPION HYDROCHLORIDE 150 MG/1
150 TABLET ORAL EVERY MORNING
Qty: 30 TABLET | Refills: 0 | Status: SHIPPED | OUTPATIENT
Start: 2024-10-11

## 2024-10-11 NOTE — LETTER
10/11/2024      Ginger Marshall  [address]  [facility] [address]      Dear Colleague,    Thank you for referring your patient, Ginger Marshall, to the [facility] [place]. Please see a copy of my visit note below.    ASSESSMENT & PLAN     Today we discussed the underlying etiology/pathology of patient's   1. Callus of foot- chronic plantar surface right 1st metatarsal head/MTP joint    2. Rheumatoid arthritis with rheumatoid factor, (H) on methatrexate    3. 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 Morbid obesity (H)    5. Immunocompromised (H)    6. History of right hip replacement    7. Primary osteoarthritis of left hip- upcoming surgery on 10/22/24      Discussed diagnosis and treatment options with the patient today. A shared decision making model was used. The patient's values and choices were respected. The following represents what was discussed and decided upon by the provider and the patient.   -We discussed that patient is concerned about some increasing discomfort as well as some slight changes to a chronic corn on the plantar surface of her right first metatarsal head pad region/MTP joint which has been present for at least a year and a half.  We reviewed patient's medical notes from podiatry and she saw Dr. Munoz in May 2024 and May 2023 for this concern as well as history of failed first bunionectomy.  - Patient has an upcoming left primary total hip arthroplasty with Dr. Jeffries-on 10/22/2024.    -Picture of patient's left foot/corn/callus was taken today and placed into patient's media file for review.  There is areas of white tissue maceration due to patient soaking her foot in water recently.  There is no evidence of surrounding cellulitis or deep tissue involvement.  Area of white maceration and central region is tender to palpation which is chronic per patient report.  Surrounding unaffected tissues are  nontender with no pain with movement of any of the greater or lesser toes or ankle.  There is no streaking or surrounding edema.  - Patient was instructed not to soak her foot and to let this tissue dry out.  Patient was given corn pads and 1 was applied today to help offload the central callus/corn lesion.  Patient may weight-bear as tolerated with a rolling walker  - I have requested that she be evaluated by podiatry next week to ensure overall stabilization of her concern with no infection which would delay her left total hip arthroplasty.  - Patient also had her preop history and physical and is chronically immunosuppressed with methotrexate due to RA.  Her primary care provider told her to continue with methotrexate all the way through the perioperative phase for her left shoulder arthroplasty.  I did clarify this with Dr. Jeffries care team/Cody Palmer PA-C today and their care team will reach out to the patient later today to discuss whether or not methotrexate needs to be held preoperatively and postoperatively.  -Patient continues to take ibuprofen 600 mg 3 times daily and discontinued Voltaren orally which was prescribed 1 month ago if she did not get benefit from it.      Call direct clinic number [[phone]] at any time with questions or concerns in regards to your recent office visit with me.     Kadeem Allred PA-C  [facility]    This note was completed in part using a voice recognition software, any grammatical or context distortion are unintentional and inherent to the software.           SUBJECTIVE  Ginger Marshall is a/an 84 year old female who is seen in the [facility] for evaluation of right foot pain. The patient is seen by themselves.    Expanded HPI: Patient has seen podiatry in the past and her concern with chronic callus/corn on the plantar surface of her right foot were thoroughly discussed.  Patient after that visit hired a home health care  nurse who comes out and does debridement once per month to the plantar surface of the first MTP joint.  She states last debridement was done approximately 2-3 weeks ago.  Debridement is done with some kind of power Irma.    Onset: 1 years(s) ago. Patient describes injury as fractured right great toe a year ago, and how has sore around the area   Location of Pain: plantar surface right great toe   Rating of Pain at worst: 5/10  Rating of Pain Currently: 0/10  Worsened by: pressure   Better with: avoidance   Treatments tried: Tylenol, ibuprofen 600 mg BIB-TID  Quality: sharp  Associated symptoms: no distal numbness or tingling; denies swelling or warmth  Orthopedic history: YES - Date: toe fracture 4/11/23  Relevant surgical history: YES - Date: hammer toes, bunion   Social history: social history: retired    Past Medical History:   Diagnosis Date     Acute posthemorrhagic anemia 10/13/2012     Closed fracture of multiple ribs of left side, initial encounter 11/25/2019     Closed fracture of olecranon process of right ulna with routine healing 02/11/2023     Closed fracture of shaft of left ulna, unspecified fracture morphology, initial encounter 02/11/2023     COPD (chronic obstructive pulmonary disease) (H) 1980's    no longer occurring     Depressive disorder      Ex-smoker 01/1999     Gastroenteritis 03/21/2020    Gastroenteritis with norovirus     Herniated nucleus pulposus, L3-4 03/01/2017     Hip joint replacement by other means 07/10/2008     History of blood transfusion      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     History of total knee replacement 07/23/2009     Menopause 1989    late 40's     Migraine 04/27/2014    resolved     Multinodular goiter      Other chronic pain     joints     Other closed intra-articular fracture of distal end of left radius, initial encounter 02/11/2023     Pelvic fracture (H) 05/13/2014     Rheumatic mitral stenosis      Rheumatoid arteritis (H)      S/P lumbar fusion  2017     Sleep apnea     uses cpap     Vitamin B12 deficiency      Social History     Socioeconomic History     Marital status: Single     Number of children: 3   Occupational History     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   Tobacco Use     Smoking status: Former     Current packs/day: 0.00     Average packs/day: 1 pack/day for 41.0 years (41.0 ttl pk-yrs)     Types: Cigarettes     Start date: 1958     Quit date: 1999     Years since quittin.7     Smokeless tobacco: Never     Tobacco comments:     former smoker   Vaping Use     Vaping status: Never Used   Substance and Sexual Activity     Alcohol use: Yes     Comment: Occasionally,  usually wine     Drug use: No     Sexual activity: Not Currently     Partners: Male     Comment: To old for all that   Other Topics Concern     Parent/sibling w/ CABG, MI or angioplasty before 65F 55M? No     Caffeine Concern Yes     Comment: She has 8 cups of coffee in the AM and is done by 8-8:30 AM.     Exercise Yes     Comment: Sometimes.  [employer] comes 3 times a week to her building.   Social History Narrative    She lives in a a senior living apartment building.    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    4 children, 3  and 1 living daughter     Social Determinants of Health     Financial Resource Strain: Low Risk  (2024)    Financial Resource Strain      Within the past 12 months, have you or your family members you live with been unable to get utilities (heat, electricity) when it was really needed?: No   Food Insecurity: Low Risk  (2024)    Food Insecurity      Within the past 12 months, did you worry that your food would run out before you got money to buy more?: No      Within the past 12 months, did the food you bought just not last and you didn t have money to get more?: No   Recent Concern: Food Insecurity - High Risk (2023)    Food Insecurity      Within the past 12 months, did you worry  that your food would run out before you got money to buy more?: Yes      Within the past 12 months, did the food you bought just not last and you didn t have money to get more?: No   Transportation Needs: Low Risk  (2/22/2024)    Transportation Needs      Within the past 12 months, has lack of transportation kept you from medical appointments, getting your medicines, non-medical meetings or appointments, work, or from getting things that you need?: No   Physical Activity: Inactive (2/22/2024)    Exercise Vital Sign      Days of Exercise per Week: 0 days      Minutes of Exercise per Session: 0 min   Stress: Stress Concern Present (2/22/2024)    [place] [employer] - Occupational Stress Questionnaire      Feeling of Stress : To some extent   Social Connections: Unknown (2/22/2024)    Social Connection and Isolation Panel [NHANES]      Frequency of Social Gatherings with Friends and Family: Once a week   Interpersonal Safety: Low Risk  (9/27/2024)    Interpersonal Safety      Do you feel physically and emotionally safe where you currently live?: Yes      Within the past 12 months, have you been hit, slapped, kicked or otherwise physically hurt by someone?: No      Within the past 12 months, have you been humiliated or emotionally abused in other ways by your partner or ex-partner?: No   Housing Stability: Low Risk  (2/22/2024)    Housing Stability      Do you have housing? : Yes      Are you worried about losing your housing?: No         Patient's past medical, surgical, social, and family histories were personally reviewed today and no changes are noted.    REVIEW OF SYSTEMS:  10 point ROS is negative other than symptoms noted above in HPI, Past Medical History or as stated below  Constitutional: NEGATIVE for fever, chills, change in weight  Skin: NEGATIVE for worrisome rashes, moles or lesions  GI/: NEGATIVE for bowel or bladder changes  Neuro: NEGATIVE for weakness, dizziness or  paresthesias    OBJECTIVE:  Vital signs as noted in EPIC for 10/11/2024  General: healthy, alert and in no distress  HEENT: no scleral icterus or conjunctival erythema  Skin: no suspicious lesions or rash. No jaundice.  CV: no pedal edema  Resp: normal respiratory effort without conversational dyspnea   Psych: normal mood and affect        MSK:  Exam shows a pleasant 84-year-old female who ambulates full weightbearing with a rolling walker.  She is alert and oriented x 3.  She denies fevers, chills or any acute illness.  Patient appears healthy.  Patient has slip on  shoes with the light socks.  Sock and shoe removed.  Patient has a Band-Aid over the plantar surface of the foot which is removed.  Patient states Band-Aid was just applied today.  Band-Aid is removed.  Patient shows a callus/corn formation over the plantar surface of the first metatarsal head/MTP joint.  Central area of whitening/deeper  corn.  Tissue is tender to palpation in the central region with dissipating soreness going into normal healthy tissue around the first MTP plantar surface.  No surrounding erythema, ecchymosis.  No drainage or focal area of abscess.  Patient can wiggle her toes actively as well as passive range of motion of all toes without pain or discomfort.  No pain in the plantar arch or the remaining plantar surface or dorsal surface of the foot.  The patient does have some slight chronic bunion type deformity with mild crossover sign of the first into the second toe plain.  Patient is neurovascularly intact.        Patient's conditions were thoroughly discussed during today's visit with total time reviewing patient's previous medical records/history/radiology, face-to-face examination and discussion and plan of care with the patient and documentation being 35 minutes for today's clinical visit    Kadeem Allred PA-C  [facility]    This note was completed in part using a voice recognition software, any  grammatical or context distortion are unintentional and inherent to the software.        Again, thank you for allowing me to participate in the care of your patient.        Sincerely,        Kadeem Allred PA-C

## 2024-10-11 NOTE — PROGRESS NOTES
ASSESSMENT & PLAN     Today we discussed the underlying etiology/pathology of patient's   1. Callus of foot- chronic plantar surface right 1st metatarsal head/MTP joint    2. Rheumatoid arthritis with rheumatoid factor, (H) on methatrexate    3. 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 Morbid obesity (H)    5. Immunocompromised (H)    6. History of right hip replacement    7. Primary osteoarthritis of left hip- upcoming surgery on 10/22/24      Discussed diagnosis and treatment options with the patient today. A shared decision making model was used. The patient's values and choices were respected. The following represents what was discussed and decided upon by the provider and the patient.   -We discussed that patient is concerned about some increasing discomfort as well as some slight changes to a chronic corn on the plantar surface of her right first metatarsal head pad region/MTP joint which has been present for at least a year and a half.  We reviewed patient's medical notes from podiatry and she saw Dr. Munoz in May 2024 and May 2023 for this concern as well as history of failed first bunionectomy.  - Patient has an upcoming left primary total hip arthroplasty with Dr. Jeffries-on 10/22/2024.    -Picture of patient's left foot/corn/callus was taken today and placed into patient's media file for review.  There is areas of white tissue maceration due to patient soaking her foot in water recently.  There is no evidence of surrounding cellulitis or deep tissue involvement.  Area of white maceration and central region is tender to palpation which is chronic per patient report.  Surrounding unaffected tissues are nontender with no pain with movement of any of the greater or lesser toes or ankle.  There is no streaking or surrounding edema.  - Patient was instructed not to soak her foot and to let this tissue dry out.  Patient was given corn pads and 1 was applied today to  help offload the central callus/corn lesion.  Patient may weight-bear as tolerated with a rolling walker  - I have requested that she be evaluated by podiatry next week to ensure overall stabilization of her concern with no infection which would delay her left total hip arthroplasty.  - Patient also had her preop history and physical and is chronically immunosuppressed with methotrexate due to RA.  Her primary care provider told her to continue with methotrexate all the way through the perioperative phase for her left shoulder arthroplasty.  I did clarify this with Dr. Jeffries care team/Cody Palmer PA-C today and their care team will reach out to the patient later today to discuss whether or not methotrexate needs to be held preoperatively and postoperatively.  -Patient continues to take ibuprofen 600 mg 3 times daily and discontinued Voltaren orally which was prescribed 1 month ago if she did not get benefit from it.      Call direct clinic number [[phone]] at any time with questions or concerns in regards to your recent office visit with me.     Kadeem Allred PA-C  [facility]    This note was completed in part using a voice recognition software, any grammatical or context distortion are unintentional and inherent to the software.           SUBJECTIVE  Ginger Marshall is a/an 84 year old female who is seen in the [facility] for evaluation of right foot pain. The patient is seen by themselves.    Expanded HPI: Patient has seen podiatry in the past and her concern with chronic callus/corn on the plantar surface of her right foot were thoroughly discussed.  Patient after that visit hired a home health care nurse who comes out and does debridement once per month to the plantar surface of the first MTP joint.  She states last debridement was done approximately 2-3 weeks ago.  Debridement is done with some kind of power Irma.    Onset: 1 years(s) ago. Patient describes  injury as fractured right great toe a year ago, and how has sore around the area   Location of Pain: plantar surface right great toe   Rating of Pain at worst: 5/10  Rating of Pain Currently: 0/10  Worsened by: pressure   Better with: avoidance   Treatments tried: Tylenol, ibuprofen 600 mg BIB-TID  Quality: sharp  Associated symptoms: no distal numbness or tingling; denies swelling or warmth  Orthopedic history: YES - Date: toe fracture 4/11/23  Relevant surgical history: YES - Date: hammer toes, bunion   Social history: social history: retired    Past Medical History:   Diagnosis Date    Acute posthemorrhagic anemia 10/13/2012    Closed fracture of multiple ribs of left side, initial encounter 11/25/2019    Closed fracture of olecranon process of right ulna with routine healing 02/11/2023    Closed fracture of shaft of left ulna, unspecified fracture morphology, initial encounter 02/11/2023    COPD (chronic obstructive pulmonary disease) (H) 1980's    no longer occurring    Depressive disorder     Ex-smoker 01/1999    Gastroenteritis 03/21/2020    Gastroenteritis with norovirus    Herniated nucleus pulposus, L3-4 03/01/2017    Hip joint replacement by other means 07/10/2008    History of blood transfusion     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    History of total knee replacement 07/23/2009    Menopause 1989    late 40's    Migraine 04/27/2014    resolved    Multinodular goiter     Other chronic pain     joints    Other closed intra-articular fracture of distal end of left radius, initial encounter 02/11/2023    Pelvic fracture (H) 05/13/2014    Rheumatic mitral stenosis     Rheumatoid arteritis (H)     S/P lumbar fusion 04/03/2017    Sleep apnea     uses cpap    Vitamin B12 deficiency      Social History     Socioeconomic History    Marital status: Single    Number of children: 3   Occupational History    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   Tobacco Use    Smoking status: Former      Current packs/day: 0.00     Average packs/day: 1 pack/day for 41.0 years (41.0 ttl pk-yrs)     Types: Cigarettes     Start date: 1958     Quit date: 1999     Years since quittin.7    Smokeless tobacco: Never    Tobacco comments:     former smoker   Vaping Use    Vaping status: Never Used   Substance and Sexual Activity    Alcohol use: Yes     Comment: Occasionally,  usually wine    Drug use: No    Sexual activity: Not Currently     Partners: Male     Comment: To old for all that   Other Topics Concern    Parent/sibling w/ CABG, MI or angioplasty before 65F 55M? No    Caffeine Concern Yes     Comment: She has 8 cups of coffee in the AM and is done by 8-8:30 AM.    Exercise Yes     Comment: Sometimes.  [employer] comes 3 times a week to her building.   Social History Narrative    She lives in a a senior living apartment building.    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    4 children, 3  and 1 living daughter     Social Determinants of Health     Financial Resource Strain: Low Risk  (2024)    Financial Resource Strain     Within the past 12 months, have you or your family members you live with been unable to get utilities (heat, electricity) when it was really needed?: No   Food Insecurity: Low Risk  (2024)    Food Insecurity     Within the past 12 months, did you worry that your food would run out before you got money to buy more?: No     Within the past 12 months, did the food you bought just not last and you didn t have money to get more?: No   Recent Concern: Food Insecurity - High Risk (2023)    Food Insecurity     Within the past 12 months, did you worry that your food would run out before you got money to buy more?: Yes     Within the past 12 months, did the food you bought just not last and you didn t have money to get more?: No   Transportation Needs: Low Risk  (2024)    Transportation Needs     Within the past 12 months, has lack of transportation  kept you from medical appointments, getting your medicines, non-medical meetings or appointments, work, or from getting things that you need?: No   Physical Activity: Inactive (2/22/2024)    Exercise Vital Sign     Days of Exercise per Week: 0 days     Minutes of Exercise per Session: 0 min   Stress: Stress Concern Present (2/22/2024)    Citizen of [employer] [place] [employer] Occupational [employer] - Occupational Stress Questionnaire     Feeling of Stress : To some extent   Social Connections: Unknown (2/22/2024)    Social Connection and Isolation Panel [NHANES]     Frequency of Social Gatherings with Friends and Family: Once a week   Interpersonal Safety: Low Risk  (9/27/2024)    Interpersonal Safety     Do you feel physically and emotionally safe where you currently live?: Yes     Within the past 12 months, have you been hit, slapped, kicked or otherwise physically hurt by someone?: No     Within the past 12 months, have you been humiliated or emotionally abused in other ways by your partner or ex-partner?: No   Housing Stability: Low Risk  (2/22/2024)    Housing Stability     Do you have housing? : Yes     Are you worried about losing your housing?: No         Patient's past medical, surgical, social, and family histories were personally reviewed today and no changes are noted.    REVIEW OF SYSTEMS:  10 point ROS is negative other than symptoms noted above in HPI, Past Medical History or as stated below  Constitutional: NEGATIVE for fever, chills, change in weight  Skin: NEGATIVE for worrisome rashes, moles or lesions  GI/: NEGATIVE for bowel or bladder changes  Neuro: NEGATIVE for weakness, dizziness or paresthesias    OBJECTIVE:  Vital signs as noted in EPIC for 10/11/2024  General: healthy, alert and in no distress  HEENT: no scleral icterus or conjunctival erythema  Skin: no suspicious lesions or rash. No jaundice.  CV: no pedal edema  Resp: normal respiratory effort without conversational dyspnea   Psych: normal mood and  affect        MSK:  Exam shows a pleasant 84-year-old female who ambulates full weightbearing with a rolling walker.  She is alert and oriented x 3.  She denies fevers, chills or any acute illness.  Patient appears healthy.  Patient has slip on  shoes with the light socks.  Sock and shoe removed.  Patient has a Band-Aid over the plantar surface of the foot which is removed.  Patient states Band-Aid was just applied today.  Band-Aid is removed.  Patient shows a callus/corn formation over the plantar surface of the first metatarsal head/MTP joint.  Central area of whitening/deeper  corn.  Tissue is tender to palpation in the central region with dissipating soreness going into normal healthy tissue around the first MTP plantar surface.  No surrounding erythema, ecchymosis.  No drainage or focal area of abscess.  Patient can wiggle her toes actively as well as passive range of motion of all toes without pain or discomfort.  No pain in the plantar arch or the remaining plantar surface or dorsal surface of the foot.  The patient does have some slight chronic bunion type deformity with mild crossover sign of the first into the second toe plain.  Patient is neurovascularly intact.        Patient's conditions were thoroughly discussed during today's visit with total time reviewing patient's previous medical records/history/radiology, face-to-face examination and discussion and plan of care with the patient and documentation being 35 minutes for today's clinical visit    Kadeem Allred PA-C  [facility]    This note was completed in part using a voice recognition software, any grammatical or context distortion are unintentional and inherent to the software.

## 2024-10-11 NOTE — PATIENT INSTRUCTIONS
Today we discussed the underlying etiology/pathology of patient's   1. Callus of foot- chronic plantar surface right 1st metatarsal head/MTP joint    2. Rheumatoid arthritis with rheumatoid factor, (H) on methatrexate    3. 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 Morbid obesity (H)    5. Immunocompromised (H)    6. History of right hip replacement    7. Primary osteoarthritis of left hip- upcoming surgery on 10/22/24      Discussed diagnosis and treatment options with the patient today. A shared decision making model was used. The patient's values and choices were respected. The following represents what was discussed and decided upon by the provider and the patient.   -We discussed that patient is concerned about some increasing discomfort as well as some slight changes to a chronic corn on the plantar surface of her right first metatarsal head pad region/MTP joint which has been present for at least a year and a half.  We reviewed patient's medical notes from podiatry and she saw Dr. Munoz in May 2024 and May 2023 for this concern as well as history of failed first bunionectomy.  - Patient has an upcoming left primary total hip arthroplasty with Dr. Jeffries-on 10/22/2024.    -Picture of patient's left foot/corn/callus was taken today and placed into patient's media file for review.  There is areas of white tissue maceration due to patient soaking her foot in water recently.  There is no evidence of surrounding cellulitis or deep tissue involvement.  Area of white maceration and central region is tender to palpation which is chronic per patient report.  Surrounding unaffected tissues are nontender with no pain with movement of any of the greater or lesser toes or ankle.  There is no streaking or surrounding edema.  - Patient was instructed not to soak her foot and to let this tissue dry out.  Patient was given corn pads and 1 was applied today to help offload the  central callus/corn lesion.  Patient may weight-bear as tolerated with a rolling walker  - I have requested that she be evaluated by podiatry next week to ensure overall stabilization of her concern with no infection which would delay her left total hip arthroplasty.  - Patient also had her preop history and physical and is chronically immunosuppressed with methotrexate due to RA.  Her primary care provider told her to continue with methotrexate all the way through the perioperative phase for her left shoulder arthroplasty.  I did clarify this with Dr. Jeffries care team/Cody Palmer PA-C today and their care team will reach out to the patient later today to discuss whether or not methotrexate needs to be held preoperatively and postoperatively.  -Patient continues to take ibuprofen 600 mg 3 times daily and discontinued Voltaren orally which was prescribed 1 month ago if she did not get benefit from it.      Call direct clinic number [[phone]] at any time with questions or concerns in regards to your recent office visit with me.     Kadeem Allred PA-C  [facility]    This note was completed in part using a voice recognition software, any grammatical or context distortion are unintentional and inherent to the software.

## 2024-10-14 NOTE — PROGRESS NOTES
Ortho Navigator Note      Pre-op Date 9/27/24     Medical Clearance  Cleared      Labs WNR      COVID Test Date No longer indicated      Skin  Intact      Activity: Ambulates independently with rolling walker      Equipment Need Patient will likely need a walker for discharge. Defer to PT/OT for recs.       Meds to Hold Held all supplements 14 days prior to surgery  Hold diclofenac 7 days prior, rinvoq 4 days prior; hold loratadine on day of surgery.   * Medication recommendations are not intended to be exhaustive; they are limited to common medications that are potentially dangerous if incorrectly managed   NPO Instructions  Instructed to stop solids 8 hr prior to arrival and clears 2 hr prior to arrival.       Pre-op Joint Education Complete? Complete    Discharge Plan Patient has plan to discharge home on morning of POD 1.   Daughter will arrive at hospital at 0800 to participate in therapy and discharge education. She will stay at daughter's house for first few days after discharge (dtr's home is 1 level) and then go to her apartment (also one level))   /Transportation Daughter will be  and transportation.  is physically able to care for patient.      Barriers to Discharge No barriers to discharge.      Additional Info/   Special Needs : Patient had no unanswered questions or concerns.           10/03/24 3750   Discharge Planning   Patient/Family Anticipates Transition to home with family  (will stay at daughter's house for first few days after discharge (dtr's home is 1 level) and then go to her apartment (also one level))   Concerns to be Addressed denies needs/concerns at this time   Living Arrangements   People in Home alone   Type of Residence Private Residence   Is your private residence a single family home or apartment? Single family home   Number of Stairs, Within Home, Primary one  (one step to get into home)   Stair Railings, Within Home, Primary none   Once home, are you able to live  on one level? Yes   Which level? Main Level   Bathroom Shower/Tub Walk-in shower   Equipment Currently Used at Home walker, rolling;walker, standard;grab bar, tub/shower   Support System   Support Systems Children   Do you have someone available to stay with you one or two nights once you are home? Yes   Medical Clearance   Date of Physical 09/27/24   [facility]   It is recommended that you call and check with any specialty providers before surgery to see if you need surgical clearance.  Do you see any specialty providers outside of your primary care provider? Yes  (has already spoken to Rheumatology and rec'd med hold instructions)   Blood   Known Bleeding Disorder or Coagulopathy No   Does the patient have any Mormon/cultural preferences related to blood products? No   Education   Has the patient scheduled or completed pre-op total joint education, either in class or online, in the last 12 months? Yes  (has set up zoom class for next week)   Patient attended total joint pre-op class/received pre-op teaching  online

## 2024-10-14 NOTE — PROGRESS NOTES
Ortho Navigator Note      Pre-op Date 11/20/24     Medical Clearance  Cleared     Labs Stable      COVID Test Date No longer indicated      Skin  Intact      Activity: Ambulates independently with rolling walker.   Left shoulder pain does impact her ROM when doing certain tasks.      Equipment Need Patient will likely need a walker for discharge. Defer to PT/OT for recs.       Meds to Hold Held all supplements 14 days prior to surgery  Hold all supplements 14 days prior to surgery  Hold Ibuprofen 48 hr prior to surgery   Hold all topical creams on day of surgery     Continue sertraline, pregabalin, pantoprazole, methotrexate, bupropion, statin, tylenol     Hold diclofenac 7 days prior, rinvoq 4 days prior; hold loratadine on day of surgery.    Rescue Inhaler: Continue PRN. Bring to hospital on the day of surgery.     * Medication recommendations are not intended to be exhaustive; they are limited to common medications that are potentially dangerous if incorrectly managed   NPO Instructions  Instructed to stop solids 8 hr prior to arrival and clears 2 hr prior to arrival.       Pre-op Joint Education Complete? Complete    Discharge Plan Patient has plan to discharge home on morning of POD 1.   Jamila will arrive at hospital at 0800 to participate in therapy and discharge education. They will then transport patient home Jamila's house to recover there fore several days before Ginger returns to her apartment.    /Transportation Daughter Jamila will be  and transportation.  is physically able to care for patient.      Barriers to Discharge No barriers to discharge.      Additional Info/   Special Needs : Patient had no unanswered questions or concerns.           10/03/24 9147   Discharge Planning   Patient/Family Anticipates Transition to home with family  (will stay at daughter's house for first few days after discharge (dtr's home is 1 level) and then go to her apartment (also one level))   Concerns to be  Addressed denies needs/concerns at this time   Living Arrangements   People in Home alone   Type of Residence Private Residence   Is your private residence a single family home or apartment? Single family home   Number of Stairs, Within Home, Primary one  (one step to get into home)   Stair Railings, Within Home, Primary none   Once home, are you able to live on one level? Yes   Which level? Main Level   Bathroom Shower/Tub Walk-in shower   Equipment Currently Used at Home walker, rolling;walker, standard;grab bar, tub/shower   Support System   Support Systems Children   Do you have someone available to stay with you one or two nights once you are home? Yes   Medical Clearance   Date of Physical 09/27/24   [facility]   It is recommended that you call and check with any specialty providers before surgery to see if you need surgical clearance.  Do you see any specialty providers outside of your primary care provider? Yes  (has already spoken to Rheumatology and rec'd med hold instructions)   Blood   Known Bleeding Disorder or Coagulopathy No   Does the patient have any Restoration/cultural preferences related to blood products? No   Education   Has the patient scheduled or completed pre-op total joint education, either in class or online, in the last 12 months? Yes  (has set up zoom class for next week)   Patient attended total joint pre-op class/received pre-op teaching  online

## 2024-10-15 ENCOUNTER — OFFICE VISIT (OUTPATIENT)
Dept: PODIATRY | Facility: CLINIC | Age: 84
End: 2024-10-15
Attending: PHYSICIAN ASSISTANT
Payer: COMMERCIAL

## 2024-10-15 ENCOUNTER — MYC MEDICAL ADVICE (OUTPATIENT)
Dept: ORTHOPEDICS | Facility: CLINIC | Age: 84
End: 2024-10-15

## 2024-10-15 VITALS — WEIGHT: 218 LBS | HEART RATE: 79 BPM | OXYGEN SATURATION: 91 % | BODY MASS INDEX: 36.28 KG/M2

## 2024-10-15 DIAGNOSIS — L02.611 ABSCESS OF RIGHT FOOT: Primary | ICD-10-CM

## 2024-10-15 PROCEDURE — 10060 I&D ABSCESS SIMPLE/SINGLE: CPT | Mod: RT | Performed by: PODIATRIST

## 2024-10-15 PROCEDURE — 99213 OFFICE O/P EST LOW 20 MIN: CPT | Mod: 25 | Performed by: PODIATRIST

## 2024-10-15 RX ORDER — CEPHALEXIN 500 MG/1
500 CAPSULE ORAL 4 TIMES DAILY
Qty: 40 CAPSULE | Refills: 0 | Status: SHIPPED | OUTPATIENT
Start: 2024-10-15 | End: 2024-10-25

## 2024-10-15 ASSESSMENT — PAIN SCALES - GENERAL: PAINLEVEL: MILD PAIN (2)

## 2024-10-15 NOTE — PATIENT INSTRUCTIONS
What is a post surgical shoe?    A post surgical shoe is a medical shoe used to protect the foot and toes after an injury or surgery. It is also called a postop shoe, rigid sole shoe, or hard sole shoe. It looks like on oversized shoe with a flat, hard sole, fabric or mesh sides, and adjustable straps. The shoe is open in the front, where your toes go. The shoe helps change how your foot carries weight. This can help decrease pain and increase movement after an injury or surgery so you can heal.                How do I put on the post surgical shoe?  Sit down and place your foot comfortably in the shoe.  Close the fabric or mesh sides over the top of your foot.  Tighten the straps of the shoe so they are snug but not too tight. The shoe should limit movement but not cut off your blood flow.  Stand up and take a few steps to practice walking.    What else do I need to know?  Check your foot and toes often. Check your foot and toes for redness and swelling. If your toes are red, swollen, numb, or tingly, loosen your straps. Over time, the swelling from the injury or surgery will decrease. When this happens, you may need to tighten the straps.  Be careful when you walk on wet surfaces. The shoe may be slippery.  Ask about removing the shoe to bathe. Your provider may want you to leave the shoe on when you bathe. Cover it with a plastic bag and tape the bag closed around your leg.  When should I call my doctor?  You have pain or discomfort that does not go away   Driving a Car  You cannot drive while you are wearing a cast or walker boot on the foot that you use to drive. Your surgeon or physiotherapist will tell you when the cast or boot is no longer needed.     Make sure to wear the shoe for the prescribed time or until the doctor tells you to discontinue it s use.

## 2024-10-15 NOTE — PROGRESS NOTES
FOOT AND ANKLE SURGERY/PODIATRY Progress Note        ASSESSMENT:   Abscess right foot    HPI: Ginger Marshall was seen again today complaining of severe pain on the bottom of her right foot.  The patient stated she has had this pain for 18 months.  She has been seen by another physician in the past.  She stated that she did not feel she was treated adequately.  Her pain is progressing.  She denies any trauma to the foot.  She has not had any noticeable drainage.  She has had some redness and swelling on the bottom of her right foot just underneath the head of the first metatarsal.  The patient stated that the pain is sensitive to light touch.  She has a white streak on the bottom of the right foot.  She denies any trauma.  She has pain even while resting.  She stated that she has a throbbing pain while resting.  She denies any other previous treatment.  She is extremely frustrated at the inability to have this condition properly evaluated and treated.    Past Medical History:   Diagnosis Date    Acute posthemorrhagic anemia 10/13/2012    Closed fracture of multiple ribs of left side, initial encounter 11/25/2019    Closed fracture of olecranon process of right ulna with routine healing 02/11/2023    Closed fracture of shaft of left ulna, unspecified fracture morphology, initial encounter 02/11/2023    COPD (chronic obstructive pulmonary disease) (H) 1980's    no longer occurring    Depressive disorder     Ex-smoker 01/1999    Gastroenteritis 03/21/2020    Gastroenteritis with norovirus    Herniated nucleus pulposus, L3-4 03/01/2017    Hip joint replacement by other means 07/10/2008    History of blood transfusion     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    History of total knee replacement 07/23/2009    Menopause 1989    late 40's    Migraine 04/27/2014    resolved    Multinodular goiter     Other chronic pain     joints    Other closed intra-articular fracture of distal end of left radius, initial encounter  2023    Pelvic fracture (H) 2014    Rheumatic mitral stenosis     Rheumatoid arteritis (H)     S/P lumbar fusion 2017    Sleep apnea     uses cpap    Vitamin B12 deficiency         Past Surgical History:   Procedure Laterality Date    ABDOMEN SURGERY      c sections    ARTHROSCOPY KNEE Left     ARTHROSCOPY KNEE RT/LT  2006    left    BACK SURGERY  2013    disc    BIOPSY BREAST      BREAST SURGERY      lumpectomy 's    BREAST SURGERY      lumpectomy    CATARACT EXTRACTION Bilateral     CATARACT IOL, RT/LT Bilateral 2010 aproximately     SECTION  1966     SECTION  1972    COLONOSCOPY  2009,    COLONOSCOPY      FINGER SURGERY Right 2019    Procedure: DISTAL ULNA RESECTION, RIGHT MIDDLE SILASTIC METACARPALPHALANGEAL EXCHANGE AND RIGHT ELBOW NODULE EXCISION.;  Surgeon: Hilario Redmond MD;  Location: [facility] OR;  Service: Orthopedics    FOOT SURGERY Left     GYN SURGERY  [age]    HAND SURGERY Right     HAND SURGERY Right     HC ESOPH/GAS REFLUX TEST W NASAL IMPED >1 HR  2012    Procedure:ESOPHAGEAL IMPEDENCE FUNCTION TEST WITH 24 HOUR PH GREATER THAN 1 HOUR; Surgeon:KAYKAY JULIO; Location: GI    IR LUMBAR EPIDURAL STEROID INJECTION      IR TRANSLAMINAR EPIDURAL LUMBAR INJ INCL IMAGING  2012    LESI L5-S1 at [facility]    LUMBAR FUSION      OPEN REDUCTION INTERNAL FIXATION ELBOW Right 2023    Procedure: OPEN REDUCTION INTERNAL FIXATION, RIGHT OLECRANON FRACTURE;  Surgeon: Pili Brock MD;  Location:  OR    OPEN REDUCTION INTERNAL FIXATION WRIST Left 2023    Procedure: OPEN REDUCTION INTERNAL FIXATION, LEFT DISTAL RADIUS FRACTURE;  Surgeon: Pili Brock MD;  Location:  OR    OPTICAL TRACKING SYSTEM FUSION POSTERIOR SPINE LUMBAR N/A 2017    Procedure: OPTICAL TRACKING SYSTEM FUSION SPINE POSTERIOR LUMBAR ONE LEVEL;  Surgeon: Anthony Michele MD;  Location:  OR    ORTHOPEDIC SURGERY    "   left foot surgery    ORTHOPEDIC SURGERY  1995    R MCP surgery    ORTHOPEDIC SURGERY  2007    right knee total replacement    TOTAL HIP ARTHROPLASTY Right     TOTAL KNEE ARTHROPLASTY Left     TOTAL KNEE ARTHROPLASTY Right     ZZC ANESTH,TOTAL HIP ARTHROPLASTY  2010    Rt hip    ZZC HAND/FINGER SURGERY UNLISTED  11/2005    right hand    ZZC TOTAL KNEE ARTHROPLASTY  2006    left       Allergies   Allergen Reactions    Abatacept Other (See Comments)     Severe headaches  Migraine    Celebrex [Roche-2 Inhibitors (Sulfonamide)]      Ineffective    Celecoxib Unknown and Nausea    Levaquin [Levofloxacin] Fatigue     Severe body pain    Orencia [Abatacept]      Headache    Septra [Bactrim] Unknown    Sulfa Antibiotics Nausea and Vomiting     \"deathly ill\"  Allergic to everything with Sulfa in it.    Sulfamethoxazole-Trimethoprim Nausea and Nausea and Vomiting    Tramadol Other (See Comments)     Headache  Migraine headache    Valdecoxib Unknown and Nausea     Made me \"very very ill\", might of been \"cramping\"    Adhesive Tape Rash     Plastic tape  Plastic tapes    Antihistamines, Chlorpheniramine-Type  [Antihistamines, Chlorpheniramine-Type] Anxiety and Other (See Comments)         Current Outpatient Medications:     acetaminophen (TYLENOL) 325 MG tablet, Take 2 tablets (650 mg) by mouth every 6 hours as needed for mild pain Max tylenol 3000 mg in 24 hours, Disp: , Rfl:     albuterol (PROAIR HFA/PROVENTIL HFA/VENTOLIN HFA) 108 (90 Base) MCG/ACT inhaler, Inhale 2 puffs into the lungs every 6 hours as needed for shortness of breath, wheezing or cough, Disp: , Rfl:     atorvastatin (LIPITOR) 40 MG tablet, Take 1 tablet (40 mg) by mouth daily, Disp: 90 tablet, Rfl: 3    buPROPion (WELLBUTRIN XL) 150 MG 24 hr tablet, TAKE ONE TABLET BY MOUTH EVERY MORNING, Disp: 30 tablet, Rfl: 0    CALCIUM CITRATE PO, Take 300 mg by mouth daily Take with meal that contains least amount of calcium, Disp: , Rfl:     carboxymethylcellulose PF " (REFRESH PLUS) 0.5 % ophthalmic solution, Place 1 drop into both eyes 2 times daily as needed for dry eyes, Disp: , Rfl:     Cholecalciferol (VITAMIN D-3 PO), Take 1,000 Units by mouth daily., Disp: , Rfl:     Ferrous Gluconate 324 (37.5 Fe) MG TABS, Take 1 tablet by mouth daily Started 6/2023, Disp: , Rfl:     HYDROcodone-acetaminophen (NORCO) 5-325 MG tablet, Take 0.5-1 tablets by mouth daily as needed for severe pain (Do not take more than one tablet per day.)., Disp: 21 tablet, Rfl: 0    ipratropium (ATROVENT) 0.06 % nasal spray, Spray 2 sprays into both nostrils 4 times daily as needed for rhinitis (nasal drainage), Disp: 15 mL, Rfl: 11    leucovorin (WELLCOVORIN) 5 MG tablet, Take 1 tablet (5 mg) by mouth every 7 days . Take 24 hours after taking Methotrexate each week., Disp: 13 tablet, Rfl: 2    lidocaine (LIDODERM) 5 % patch, Place 1 patch over 12 hours onto the skin every 24 hours. To prevent lidocaine toxicity, patient should be patch free for 12 hrs daily., Disp: 30 patch, Rfl: 1    loratadine (CLARITIN) 10 MG tablet, Take 1 tablet (10 mg) by mouth 2 times daily, Disp: , Rfl:     Methotrexate, PF, (RASUVO) 25 MG/0.5ML autoinjector, Inject 0.5 mLs (25 mg) subcutaneously every 7 days. . Hold for signs of infection, and seek medical attention. Take every Thursday., Disp: 2 mL, Rfl: 9    olopatadine (PATADAY) 0.2 % ophthalmic solution, Place 1 drop into both eyes daily as needed Uses mostly in Spring, Disp: , Rfl:     pantoprazole (PROTONIX) 40 MG EC tablet, TAKE ONE TABLET BY MOUTH EVERY MORNING BEFORE BREAKFAST, Disp: 90 tablet, Rfl: 0    pregabalin (LYRICA) 50 MG capsule, Take 1 capsule (50 mg) by mouth 3 times daily, Disp: 270 capsule, Rfl: 1    sertraline (ZOLOFT) 100 MG tablet, TAKE 1 TABLET BY MOUTH DAILY, Disp: 30 tablet, Rfl: 0    UNABLE TO FIND, MEDICATION NAME: Distilled water for CPAP, Disp: , Rfl:     upadacitinib ER (RINVOQ) 15 MG tablet, Take 1 tablet (15 mg) by mouth daily. . Hold for any  infection and seek medical attention., Disp: 30 tablet, Rfl: 9    valACYclovir (VALTREX) 500 MG tablet, TAKE ONE TABLET BY MOUTH TWICE A DAY, Disp: 180 tablet, Rfl: 1    vitamin C (ASCORBIC ACID) 1000 MG TABS, Take 1 tablet (1,000 mg) by mouth daily, Disp: , Rfl:     zoledronic Acid (RECLAST) 5 MG/100ML SOLN infusion, Inject 5 mg into the vein once Every year (next dose 2023), Disp: , Rfl:     Family History   Problem Relation Age of Onset    Arthritis Mother     Alzheimer Disease Mother     Hyperlipidemia Mother     Osteoporosis Mother     Heart Disease Father         MI ( from this)    Alcohol/Drug Father     Arthritis Sister     Hypertension Sister     Other Cancer Sister         Luekemia    Neurologic Disorder Sister         Schizophrenic    Hypertension Sister             Hyperlipidemia Sister     Mental Illness Sister         Bipolar    Glaucoma Daughter     Diabetes Son         type 1,     Diabetes Son         ,  cancer    Esophageal Cancer Son         Drank and smoked    Substance Abuse Son     Other Cancer Son         Esophageal    Diabetes Other         type 2    Hyperlipidemia Other        Social History     Socioeconomic History    Marital status: Single     Spouse name: Not on file    Number of children: 3    Years of education: Not on file    Highest education level: Not on file   Occupational History    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   Tobacco Use    Smoking status: Former     Current packs/day: 0.00     Average packs/day: 1 pack/day for 41.0 years (41.0 ttl pk-yrs)     Types: Cigarettes     Start date: 1958     Quit date: 1999     Years since quittin.8    Smokeless tobacco: Never    Tobacco comments:     former smoker   Vaping Use    Vaping status: Never Used   Substance and Sexual Activity    Alcohol use: Yes     Comment: Occasionally,  usually wine    Drug use: No    Sexual activity: Not Currently     Partners: Male      Comment: To old for all that   Other Topics Concern    Parent/sibling w/ CABG, MI or angioplasty before 65F 55M? No     Service Not Asked    Blood Transfusions Not Asked    Caffeine Concern Yes     Comment: She has 8 cups of coffee in the AM and is done by 8-8:30 AM.    Occupational Exposure Not Asked    Hobby Hazards Not Asked    Sleep Concern Not Asked    Stress Concern Not Asked    Weight Concern Not Asked    Special Diet Not Asked    Back Care Not Asked    Exercise Yes     Comment: Sometimes.  Gordo Redd comes 3 times a week to her building.    Bike Helmet Not Asked    Seat Belt Not Asked    Self-Exams Not Asked   Social History Narrative    She lives in a a senior living apartment building.    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    4 children, 3  and 1 living daughter     Social Determinants of Health     Financial Resource Strain: Low Risk  (2024)    Financial Resource Strain     Within the past 12 months, have you or your family members you live with been unable to get utilities (heat, electricity) when it was really needed?: No   Food Insecurity: Low Risk  (2024)    Food Insecurity     Within the past 12 months, did you worry that your food would run out before you got money to buy more?: No     Within the past 12 months, did the food you bought just not last and you didn t have money to get more?: No   Recent Concern: Food Insecurity - High Risk (2023)    Food Insecurity     Within the past 12 months, did you worry that your food would run out before you got money to buy more?: Yes     Within the past 12 months, did the food you bought just not last and you didn t have money to get more?: No   Transportation Needs: Low Risk  (2024)    Transportation Needs     Within the past 12 months, has lack of transportation kept you from medical appointments, getting your medicines, non-medical meetings or appointments, work, or from getting things that you  need?: No   Physical Activity: Inactive (2/22/2024)    Exercise Vital Sign     Days of Exercise per Week: 0 days     Minutes of Exercise per Session: 0 min   Stress: Stress Concern Present (2/22/2024)    [place] [employer] - Occupational Stress Questionnaire     Feeling of Stress : To some extent   Social Connections: Unknown (2/22/2024)    Social Connection and Isolation Panel [NHANES]     Frequency of Communication with Friends and Family: Not on file     Frequency of Social Gatherings with Friends and Family: Once a week     Attends Anglican Services: Not on file     Active Member of Clubs or Organizations: Not on file     Attends Club or Organization Meetings: Not on file     Marital Status: Not on file   Interpersonal Safety: Low Risk  (9/27/2024)    Interpersonal Safety     Do you feel physically and emotionally safe where you currently live?: Yes     Within the past 12 months, have you been hit, slapped, kicked or otherwise physically hurt by someone?: No     Within the past 12 months, have you been humiliated or emotionally abused in other ways by your partner or ex-partner?: No   Housing Stability: Low Risk  (2/22/2024)    Housing Stability     Do you have housing? : Yes     Are you worried about losing your housing?: No       10 point Review of Systems is negative    Pulse 79   Wt 98.9 kg (218 lb)   SpO2 91%   BMI 36.28 kg/m      BMI= Body mass index is 36.28 kg/m .    OBJECTIVE:  General appearance: Patient is alert and fully cooperative with history & exam.  No sign of distress is noted during the visit.  Vascular: Dorsalis pedis and posterior tibial pulses are palpable. There is no pedal hair growth bilaterally.  CFT < 3 sec from anterior tibial surface to distal digits bilaterally. There is  appreciable edema and erythema noted right foot.  No fluctuance noted.  No odor noted.  Dermatologic: Small pustule noted subfirst metatarsal head right foot.  Turgor and texture are  within normal limits. No coloration or temperature changes. No primary or secondary lesions noted.  Neurologic: All epicritic and proprioceptive sensations are grossly intact bilaterally.  Musculoskeletal: All active and passive ankle, subtalar, midtarsal, and 1st MPJ range of motion are grossly intact bilaterally. Manual muscle strength is within normal limits bilaterally. All dorsiflexors, plantarflexors, invertors, evertors are intact bilaterally. Tenderness present to plantar aspect of the right foot on palpation. Tenderness to the plantar aspect of the right foot with range of motion. Calf is soft/non-tender without warmth/induration    Imaging:         No results found.         TREATMENT:  Performed incision and drainage of abscess on the plantar aspect of the right foot today.  A small stab incision was made just inferior to the head of the first metatarsal with a #15 blade.  Once this had been accomplished a significant amount of purulent drainage was noted.  This was completely expressed from the wound.  No odor noted.  A sterile dressing was applied.  I have asked the patient remain nonweightbearing in a postop shoe and return to the clinic in 1 week for follow-up visit.  The patient was placed on Keflex 500 mg 1 tab 4 times daily for 10 days.  I informed the patient that these type of infections can progress and become quite severe which could require hospitalization and IV antibiotics and further surgical intervention.        Alf Ramos; WILLIAM  [facility] Foot & Ankle Surgery/Podiatry

## 2024-10-15 NOTE — Clinical Note
10/15/2024      Ginger Marshall  [address]  [place] [address]      Dear Colleague,    Thank you for referring your patient, Ginger Marshall, to the [facility]. Please see a copy of my visit note below.    No notes on file    Again, thank you for allowing me to participate in the care of your patient.        Sincerely,        Alf Ramos DPM

## 2024-10-16 NOTE — TELEPHONE ENCOUNTER
Noted. Post surgical physical therapy referral placed to reflect new surgery date.    Sneha Mack RN on 10/16/2024 at 12:59 PM

## 2024-10-17 ENCOUNTER — MYC MEDICAL ADVICE (OUTPATIENT)
Dept: ORTHOPEDICS | Facility: CLINIC | Age: 84
End: 2024-10-17
Payer: COMMERCIAL

## 2024-10-17 NOTE — TELEPHONE ENCOUNTER
Spoke to Ginger, advised that as long as she is seeing a [facility] provider we can see the preop in the system.

## 2024-10-22 ENCOUNTER — OFFICE VISIT (OUTPATIENT)
Dept: PODIATRY | Facility: CLINIC | Age: 84
End: 2024-10-22
Payer: COMMERCIAL

## 2024-10-22 ENCOUNTER — HOSPITAL ENCOUNTER (OUTPATIENT)
Dept: GENERAL RADIOLOGY | Facility: HOSPITAL | Age: 84
Discharge: HOME | End: 2024-10-22
Attending: PODIATRIST | Admitting: PODIATRIST
Payer: COMMERCIAL

## 2024-10-22 VITALS — WEIGHT: 218 LBS | BODY MASS INDEX: 36.28 KG/M2 | OXYGEN SATURATION: 92 % | HEART RATE: 74 BPM

## 2024-10-22 DIAGNOSIS — M20.21 HALLUX RIGIDUS, RIGHT FOOT: ICD-10-CM

## 2024-10-22 DIAGNOSIS — M25.871 SESAMOIDITIS OF RIGHT FOOT: ICD-10-CM

## 2024-10-22 DIAGNOSIS — L02.611 ABSCESS OF RIGHT FOOT: Primary | ICD-10-CM

## 2024-10-22 PROCEDURE — 73630 X-RAY EXAM OF FOOT: CPT | Mod: 26 | Performed by: PODIATRIST

## 2024-10-22 PROCEDURE — 73630 X-RAY EXAM OF FOOT: CPT | Mod: RT

## 2024-10-22 PROCEDURE — 99024 POSTOP FOLLOW-UP VISIT: CPT | Performed by: PODIATRIST

## 2024-10-22 ASSESSMENT — PAIN SCALES - GENERAL: PAINLEVEL: NO PAIN (0)

## 2024-10-22 NOTE — PATIENT INSTRUCTIONS
Dear Ginger MONTEMAYOR Sweats,    Your symptoms show that you may have coronavirus (COVID-19). This illness can cause fever, cough and trouble breathing. Many people get a mild case and get better on their own. Some people can get very sick.    Will I be tested for COVID-19?  We would like to test you for Covid-19 virus. I have placed orders for this test.     To schedule: go to your [employer] home page and scroll down to the section that says  You have an appointment that needs to be scheduled  and click the large green button that says  Schedule Now  and follow the steps to find the next available openings.    If you are unable to complete these [employer] scheduling steps, please call [phone] to schedule your testing.     Return to work/school/ guidance:  Please let your workplace manager and staffing office know when your quarantine ends     We can t give you an exact date as it depends on the above. You can calculate this on your own or work with your manager/staffing office to calculate this. (For example if you were exposed on 10/4, you would have to quarantine for 14 full days. That would be through 10/18. You could return on 10/19.)      If you receive a positive COVID-19 test result, follow the guidance of the those who are giving you the results. Usually the return to work is 10 (or in some cases 20 days from symptom onset.) If you work at [facility], you must also be cleared by Employee Occupational Health and Safety to return to work.        If you receive a negative COVID-19 test result and did not have a high risk exposure to someone with a known positive COVID-19 test, you can return to work once you're free of fever for 24 hours without fever-reducing medication and your symptoms are improving or resolved.      If you receive a negative COVID-19 test and If you had a high risk exposure to someone who has tested positive for COVID-19 then you can return to work 14 days after your last contact  with the positive individual    Note: If you have ongoing exposure to the covid positive person, this quarantine period may be more than 14 days. (For example, if you are continued to be exposed to your child who tested positive and cannot isolate from them, then the quarantine of 7-14 days can't start until your child is no longer contagious. This is typically 10 days from onset of the child's symptoms. So the total duration may be 17-24 days in this case.)    Sign up for [employer].   We know it's scary to hear that you might have COVID-19. We want to track your symptoms to make sure you're okay over the next 2 weeks. Please look for an email from [employer]--this is a free, online program that we'll use to keep in touch. To sign up, follow the link in the email you will receive. Learn more at [email or web address]    How can I take care of myself?    Get lots of rest. Drink extra fluids (unless a doctor has told you not to)    Take Tylenol (acetaminophen) or ibuprofen for fever or pain. If you have liver or kidney problems, ask your family doctor if it's okay to take Tylenol o ibuprofen    If you have other health problems (like cancer, heart failure, an organ transplant or severe kidney disease): Call your specialty clinic if you don't feel better in the next 2 days.    Know when to call 911. Emergency warning signs include:  o Trouble breathing or shortness of breath  o Pain or pressure in the chest that doesn't go away  o Feeling confused like you haven't felt before, or not being able to wake up  o Bluish-colored lips or face    Where can I get more information?  [employer] - About COVID-19:   [email or web address]    CDC - What to Do If You're Sick:   www.cdc.gov/coronavirus/2019-ncov/about/steps-when-sick.html     Yes

## 2024-10-22 NOTE — Clinical Note
10/22/2024      Ginger Marshall  [address]  [place] [address]      Dear Colleague,    Thank you for referring your patient, Ginger Marshall, to the [facility]. Please see a copy of my visit note below.    No notes on file    Again, thank you for allowing me to participate in the care of your patient.        Sincerely,        Alf Ramos DPM

## 2024-10-22 NOTE — PROGRESS NOTES
FOOT AND ANKLE SURGERY/PODIATRY Progress Note        ASSESSMENT:   Abscess right foot  Hallux rigidus right foot  Sesamoiditis right foot     TREATMENT:  X-rays of the right foot were taken today.  I informed the patient that she would require surgical correction to alleviate her severe painful deformity of the right foot.  I recommended a takedown of the arthrodesis with a first metatarsal phalangeal joint implant arthroplasty and a tibial sesamoidectomy.  The patient was told the procedure will be done on an outpatient basis under local anesthesia with IV sedation.  She was told the procedure will take approximately 40 minutes to perform.  She would then be discharged weightbearing in a postop shoe for 2 weeks.  The patient was told she would have to go n.p.o. at midnight prior to the procedure and she would have to see her primary care physician for preoperative consultation.  All pertinent questions were invited and answered.      HPI: Ginger Marshall was seen again today for follow-up evaluation of severe pain in the bottom of the right foot.  The patient indicated that she had had this pain for 18 months.  During her last visit she was diagnosed with an abscess.  An I&D of the abscess was performed.  The patient stated that her pain has been completely resolved.  The patient also complained of the painful reoccurring callus on the bottom of the right foot just inferior to the head of the first metatarsal.  She had an arthrodesis performed several years ago.  The patient stated that since that time she continues to have pain on the bottom of the joint with a painful callus.  It interferes with her gait.  She has tried several inserts and orthotics which gave her no relief.  She describes it as a sharp pain which is only relieved with nonweightbearing.  The patient wanted to know if there is anything that could be done surgically to improve her symptoms.    Past Medical History:   Diagnosis Date    Acute  posthemorrhagic anemia 10/13/2012    Closed fracture of multiple ribs of left side, initial encounter 2019    Closed fracture of olecranon process of right ulna with routine healing 2023    Closed fracture of shaft of left ulna, unspecified fracture morphology, initial encounter 2023    COPD (chronic obstructive pulmonary disease) (H)     no longer occurring    Depressive disorder     Ex-smoker 1999    Gastroenteritis 2020    Gastroenteritis with norovirus    Herniated nucleus pulposus, L3-4 2017    Hip joint replacement by other means 07/10/2008    History of blood transfusion     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    History of total knee replacement 2009    Menopause 1989    late 40's    Migraine 2014    resolved    Multinodular goiter     Other chronic pain     joints    Other closed intra-articular fracture of distal end of left radius, initial encounter 2023    Pelvic fracture (H) 2014    Rheumatic mitral stenosis     Rheumatoid arteritis (H)     S/P lumbar fusion 2017    Sleep apnea     uses cpap    Vitamin B12 deficiency         Past Surgical History:   Procedure Laterality Date    ABDOMEN SURGERY      c sections    ARTHROSCOPY KNEE Left     ARTHROSCOPY KNEE RT/LT  2006    left    BACK SURGERY  2013    disc    BIOPSY BREAST      BREAST SURGERY      lumpectomy 's    BREAST SURGERY      lumpectomy    CATARACT EXTRACTION Bilateral     CATARACT IOL, RT/LT Bilateral 2010 aproximately     SECTION  1966     SECTION  1972    COLONOSCOPY  2009,    COLONOSCOPY      FINGER SURGERY Right 2019    Procedure: DISTAL ULNA RESECTION, RIGHT MIDDLE SILASTIC METACARPALPHALANGEAL EXCHANGE AND RIGHT ELBOW NODULE EXCISION.;  Surgeon: Hilario Redmond MD;  Location: [facility] OR;  Service: Orthopedics    FOOT SURGERY Left     GYN SURGERY  [age]    HAND SURGERY Right     HAND SURGERY Right       "ESOPH/GAS REFLUX TEST W NASAL IMPED >1 HR  02/01/2012    Procedure:ESOPHAGEAL IMPEDENCE FUNCTION TEST WITH 24 HOUR PH GREATER THAN 1 HOUR; Surgeon:KAYKAY JULIO; Location:[facility]    IR LUMBAR EPIDURAL STEROID INJECTION      IR TRANSLAMINAR EPIDURAL LUMBAR INJ INCL IMAGING  05/02/2012    LESI L5-S1 at [facility]    LUMBAR FUSION      OPEN REDUCTION INTERNAL FIXATION ELBOW Right 2/12/2023    Procedure: OPEN REDUCTION INTERNAL FIXATION, RIGHT OLECRANON FRACTURE;  Surgeon: Pili Brock MD;  Location:  OR    OPEN REDUCTION INTERNAL FIXATION WRIST Left 2/12/2023    Procedure: OPEN REDUCTION INTERNAL FIXATION, LEFT DISTAL RADIUS FRACTURE;  Surgeon: Pili Brock MD;  Location:  OR    OPTICAL TRACKING SYSTEM FUSION POSTERIOR SPINE LUMBAR N/A 04/03/2017    Procedure: OPTICAL TRACKING SYSTEM FUSION SPINE POSTERIOR LUMBAR ONE LEVEL;  Surgeon: Anthony Michele MD;  Location:  OR    ORTHOPEDIC SURGERY  1991    left foot surgery    ORTHOPEDIC SURGERY  1995    R MCP surgery    ORTHOPEDIC SURGERY  2007    right knee total replacement    TOTAL HIP ARTHROPLASTY Right     TOTAL KNEE ARTHROPLASTY Left     TOTAL KNEE ARTHROPLASTY Right     ZZC ANESTH,TOTAL HIP ARTHROPLASTY  2010    Rt hip    ZZC HAND/FINGER SURGERY UNLISTED  11/2005    right hand    ZZC TOTAL KNEE ARTHROPLASTY  2006    left       Allergies   Allergen Reactions    Abatacept Other (See Comments)     Severe headaches  Migraine    Celebrex [Roche-2 Inhibitors (Sulfonamide)]      Ineffective    Celecoxib Unknown and Nausea    Levaquin [Levofloxacin] Fatigue     Severe body pain    Orencia [Abatacept]      Headache    Septra [Bactrim] Unknown    Sulfa Antibiotics Nausea and Vomiting     \"deathly ill\"  Allergic to everything with Sulfa in it.    Sulfamethoxazole-Trimethoprim Nausea and Nausea and Vomiting    Tramadol Other (See Comments)     Headache  Migraine headache    Valdecoxib Unknown and Nausea     Made me \"very very ill\", might of been " "\"cramping\"    Adhesive Tape Rash     Plastic tape  Plastic tapes    Antihistamines, Chlorpheniramine-Type  [Antihistamines, Chlorpheniramine-Type] Anxiety and Other (See Comments)         Current Outpatient Medications:     acetaminophen (TYLENOL) 325 MG tablet, Take 2 tablets (650 mg) by mouth every 6 hours as needed for mild pain Max tylenol 3000 mg in 24 hours, Disp: , Rfl:     albuterol (PROAIR HFA/PROVENTIL HFA/VENTOLIN HFA) 108 (90 Base) MCG/ACT inhaler, Inhale 2 puffs into the lungs every 6 hours as needed for shortness of breath, wheezing or cough, Disp: , Rfl:     atorvastatin (LIPITOR) 40 MG tablet, Take 1 tablet (40 mg) by mouth daily, Disp: 90 tablet, Rfl: 3    buPROPion (WELLBUTRIN XL) 150 MG 24 hr tablet, TAKE ONE TABLET BY MOUTH EVERY MORNING, Disp: 30 tablet, Rfl: 0    CALCIUM CITRATE PO, Take 300 mg by mouth daily Take with meal that contains least amount of calcium, Disp: , Rfl:     carboxymethylcellulose PF (REFRESH PLUS) 0.5 % ophthalmic solution, Place 1 drop into both eyes 2 times daily as needed for dry eyes, Disp: , Rfl:     cephALEXin (KEFLEX) 500 MG capsule, Take 1 capsule (500 mg) by mouth 4 times daily for 10 days., Disp: 40 capsule, Rfl: 0    Cholecalciferol (VITAMIN D-3 PO), Take 1,000 Units by mouth daily., Disp: , Rfl:     Ferrous Gluconate 324 (37.5 Fe) MG TABS, Take 1 tablet by mouth daily Started 6/2023, Disp: , Rfl:     HYDROcodone-acetaminophen (NORCO) 5-325 MG tablet, Take 0.5-1 tablets by mouth daily as needed for severe pain (Do not take more than one tablet per day.)., Disp: 21 tablet, Rfl: 0    ipratropium (ATROVENT) 0.06 % nasal spray, Spray 2 sprays into both nostrils 4 times daily as needed for rhinitis (nasal drainage), Disp: 15 mL, Rfl: 11    leucovorin (WELLCOVORIN) 5 MG tablet, Take 1 tablet (5 mg) by mouth every 7 days . Take 24 hours after taking Methotrexate each week., Disp: 13 tablet, Rfl: 2    lidocaine (LIDODERM) 5 % patch, Place 1 patch over 12 hours onto the " skin every 24 hours. To prevent lidocaine toxicity, patient should be patch free for 12 hrs daily., Disp: 30 patch, Rfl: 1    loratadine (CLARITIN) 10 MG tablet, Take 1 tablet (10 mg) by mouth 2 times daily, Disp: , Rfl:     Methotrexate, PF, (RASUVO) 25 MG/0.5ML autoinjector, Inject 0.5 mLs (25 mg) subcutaneously every 7 days. . Hold for signs of infection, and seek medical attention. Take every Thursday., Disp: 2 mL, Rfl: 9    olopatadine (PATADAY) 0.2 % ophthalmic solution, Place 1 drop into both eyes daily as needed Uses mostly in Spring, Disp: , Rfl:     pantoprazole (PROTONIX) 40 MG EC tablet, TAKE ONE TABLET BY MOUTH EVERY MORNING BEFORE BREAKFAST, Disp: 90 tablet, Rfl: 0    pregabalin (LYRICA) 50 MG capsule, Take 1 capsule (50 mg) by mouth 3 times daily, Disp: 270 capsule, Rfl: 1    sertraline (ZOLOFT) 100 MG tablet, TAKE 1 TABLET BY MOUTH DAILY, Disp: 30 tablet, Rfl: 0    UNABLE TO FIND, MEDICATION NAME: Distilled water for CPAP, Disp: , Rfl:     upadacitinib ER (RINVOQ) 15 MG tablet, Take 1 tablet (15 mg) by mouth daily. . Hold for any infection and seek medical attention., Disp: 30 tablet, Rfl: 9    valACYclovir (VALTREX) 500 MG tablet, TAKE ONE TABLET BY MOUTH TWICE A DAY, Disp: 180 tablet, Rfl: 1    vitamin C (ASCORBIC ACID) 1000 MG TABS, Take 1 tablet (1,000 mg) by mouth daily, Disp: , Rfl:     zoledronic Acid (RECLAST) 5 MG/100ML SOLN infusion, Inject 5 mg into the vein once Every year (next dose 2023), Disp: , Rfl:     Family History   Problem Relation Age of Onset    Arthritis Mother     Alzheimer Disease Mother     Hyperlipidemia Mother     Osteoporosis Mother     Heart Disease Father         MI ( from this)    Alcohol/Drug Father     Arthritis Sister     Hypertension Sister     Other Cancer Sister         Luekemia    Neurologic Disorder Sister         Schizophrenic    Hypertension Sister             Hyperlipidemia Sister     Mental Illness Sister         Bipolar    Glaucoma  Daughter     Diabetes Son         type 1,     Diabetes Son         ,  cancer    Esophageal Cancer Son         Drank and smoked    Substance Abuse Son     Other Cancer Son         Esophageal    Diabetes Other         type 2    Hyperlipidemia Other        Social History     Socioeconomic History    Marital status: Single     Spouse name: Not on file    Number of children: 3    Years of education: Not on file    Highest education level: Not on file   Occupational History    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   Tobacco Use    Smoking status: Former     Current packs/day: 0.00     Average packs/day: 1 pack/day for 41.0 years (41.0 ttl pk-yrs)     Types: Cigarettes     Start date: 1958     Quit date: 1999     Years since quittin.8    Smokeless tobacco: Never    Tobacco comments:     former smoker   Vaping Use    Vaping status: Never Used   Substance and Sexual Activity    Alcohol use: Yes     Comment: Occasionally,  usually wine    Drug use: No    Sexual activity: Not Currently     Partners: Male     Comment: To old for all that   Other Topics Concern    Parent/sibling w/ CABG, MI or angioplasty before 65F 55M? No     Service Not Asked    Blood Transfusions Not Asked    Caffeine Concern Yes     Comment: She has 8 cups of coffee in the AM and is done by 8-8:30 AM.    Occupational Exposure Not Asked    Hobby Hazards Not Asked    Sleep Concern Not Asked    Stress Concern Not Asked    Weight Concern Not Asked    Special Diet Not Asked    Back Care Not Asked    Exercise Yes     Comment: Sometimes.  [employer] comes 3 times a week to her building.    Bike Helmet Not Asked    Seat Belt Not Asked    Self-Exams Not Asked   Social History Narrative    She lives in a a senior living apartment building.    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    4 children, 3  and 1 living daughter     Social Determinants of Health     Financial Resource Strain: Low  Risk  (2/22/2024)    Financial Resource Strain     Within the past 12 months, have you or your family members you live with been unable to get utilities (heat, electricity) when it was really needed?: No   Food Insecurity: Low Risk  (2/22/2024)    Food Insecurity     Within the past 12 months, did you worry that your food would run out before you got money to buy more?: No     Within the past 12 months, did the food you bought just not last and you didn t have money to get more?: No   Recent Concern: Food Insecurity - High Risk (11/26/2023)    Food Insecurity     Within the past 12 months, did you worry that your food would run out before you got money to buy more?: Yes     Within the past 12 months, did the food you bought just not last and you didn t have money to get more?: No   Transportation Needs: Low Risk  (2/22/2024)    Transportation Needs     Within the past 12 months, has lack of transportation kept you from medical appointments, getting your medicines, non-medical meetings or appointments, work, or from getting things that you need?: No   Physical Activity: Inactive (2/22/2024)    Exercise Vital Sign     Days of Exercise per Week: 0 days     Minutes of Exercise per Session: 0 min   Stress: Stress Concern Present (2/22/2024)    [place] [employer] - Occupational Stress Questionnaire     Feeling of Stress : To some extent   Social Connections: Unknown (2/22/2024)    Social Connection and Isolation Panel [NHANES]     Frequency of Communication with Friends and Family: Not on file     Frequency of Social Gatherings with Friends and Family: Once a week     Attends Latter day Services: Not on file     Active Member of Clubs or Organizations: Not on file     Attends Club or Organization Meetings: Not on file     Marital Status: Not on file   Interpersonal Safety: Low Risk  (9/27/2024)    Interpersonal Safety     Do you feel physically and emotionally safe where you currently live?: Yes      Within the past 12 months, have you been hit, slapped, kicked or otherwise physically hurt by someone?: No     Within the past 12 months, have you been humiliated or emotionally abused in other ways by your partner or ex-partner?: No   Housing Stability: Low Risk  (2/22/2024)    Housing Stability     Do you have housing? : Yes     Are you worried about losing your housing?: No       10 point Review of Systems is negative .     Pulse 74   Wt 98.9 kg (218 lb)   SpO2 92%   BMI 36.28 kg/m      BMI= Body mass index is 36.28 kg/m .    OBJECTIVE:  General appearance: Patient is alert and fully cooperative with history & exam.  No sign of distress is noted during the visit.  Vascular: Dorsalis pedis and posterior tibial pulses are palpable. There is no pedal hair growth bilaterally.  CFT < 3 sec from anterior tibial surface to distal digits bilaterally. There is no appreciable edema and erythema noted right foot.  No fluctuance noted.   Dermatologic: Hyperkeratotic nucleated lesion subfirst metatarsal head right foot..  Turgor and texture are within normal limits. No coloration or temperature changes. No primary or secondary lesions noted.  Neurologic: All epicritic and proprioceptive sensations are grossly intact bilaterally.  Musculoskeletal: All active and passive ankle, subtalar, midtarsal, and 1st MPJ range of motion are grossly intact bilaterally. Manual muscle strength is within normal limits bilaterally. All dorsiflexors, plantarflexors, invertors, evertors are intact bilaterally. Tenderness present to the subfirst metatarsal head right foot.  No tenderness to the right foot with range of motion.  0 degrees range of motion at the first MPJ right foot.  Calf is soft/non-tender without warmth/induration    Imaging:         No results found.         Alf Ramos; WILLIAM  [facility] Foot & Ankle Surgery/Podiatry

## 2024-10-23 DIAGNOSIS — F41.9 ANXIETY: ICD-10-CM

## 2024-10-23 DIAGNOSIS — F33.1 MODERATE EPISODE OF RECURRENT MAJOR DEPRESSIVE DISORDER (H): ICD-10-CM

## 2024-10-23 RX ORDER — SERTRALINE HYDROCHLORIDE 100 MG/1
100 TABLET, FILM COATED ORAL DAILY
Qty: 90 TABLET | Refills: 1 | Status: SHIPPED | OUTPATIENT
Start: 2024-10-23

## 2024-10-25 ENCOUNTER — OFFICE VISIT (OUTPATIENT)
Dept: ORTHOPEDICS | Facility: CLINIC | Age: 84
End: 2024-10-25
Payer: COMMERCIAL

## 2024-10-25 VITALS
BODY MASS INDEX: 36.32 KG/M2 | SYSTOLIC BLOOD PRESSURE: 145 MMHG | DIASTOLIC BLOOD PRESSURE: 75 MMHG | WEIGHT: 218 LBS | HEIGHT: 65 IN

## 2024-10-25 DIAGNOSIS — Z98.890 S/P LEFT ROTATOR CUFF REPAIR: ICD-10-CM

## 2024-10-25 DIAGNOSIS — M75.82 TENDINITIS OF LEFT ROTATOR CUFF: ICD-10-CM

## 2024-10-25 DIAGNOSIS — M19.012 OSTEOARTHRITIS OF LEFT GLENOHUMERAL JOINT: Primary | ICD-10-CM

## 2024-10-25 PROCEDURE — 99213 OFFICE O/P EST LOW 20 MIN: CPT | Mod: 25 | Performed by: STUDENT IN AN ORGANIZED HEALTH CARE EDUCATION/TRAINING PROGRAM

## 2024-10-25 PROCEDURE — 20611 DRAIN/INJ JOINT/BURSA W/US: CPT | Mod: LT | Performed by: STUDENT IN AN ORGANIZED HEALTH CARE EDUCATION/TRAINING PROGRAM

## 2024-10-25 RX ORDER — TRIAMCINOLONE ACETONIDE 40 MG/ML
40 INJECTION, SUSPENSION INTRA-ARTICULAR; INTRAMUSCULAR
Status: COMPLETED | OUTPATIENT
Start: 2024-10-25 | End: 2024-10-25

## 2024-10-25 RX ORDER — LIDOCAINE HYDROCHLORIDE 10 MG/ML
4 INJECTION, SOLUTION INFILTRATION; PERINEURAL
Status: COMPLETED | OUTPATIENT
Start: 2024-10-25 | End: 2024-10-25

## 2024-10-25 RX ADMIN — LIDOCAINE HYDROCHLORIDE 4 ML: 10 INJECTION, SOLUTION INFILTRATION; PERINEURAL at 13:17

## 2024-10-25 RX ADMIN — TRIAMCINOLONE ACETONIDE 40 MG: 40 INJECTION, SUSPENSION INTRA-ARTICULAR; INTRAMUSCULAR at 13:17

## 2024-10-25 NOTE — PATIENT INSTRUCTIONS
1. Osteoarthritis of left glenohumeral joint    2. S/P left rotator cuff repair    3. Tendinitis of left rotator cuff        Plan:  -Start PT and home exercise program   -Repeat left subacromial bursa injection in clinic today. Could consider glenohumeral joint injection in the future  -Tylenol Extra Strength (1000mg) up to three times daily as needed for pain  -If no improvement, consider MRI and/or surgical referral     If you had imaging performed/ordered at your appointment today, you can expect to see your radiology results in KnowRe within approximately 48 business hours.     If you have questions/concerns after your appointment, please send my team a KnowRe message or call the clinic at [phone].     Dr. Mecca Ruvalcaba, DO, [facility]  Sports Medicine and Orthopedics    Dr. Ruvalcaba's Clinic Locations and Contact Numbers:   [place] APPOINTMENTS: [phone]      [address] [facility] [facility] RADIOLOGY: [phone]   [place] [address] PHYSICAL THERAPY: [phone]    HAND THERAPY/OT: [phone]   [place] BILLING QUESTIONS: [phone] [address] FAX: [phone]   [place] [address]       Post-Injection Discharge Instructions    You may shower, however avoid swimming, tub baths or hot tubs for 24 hours following your procedure  You may have a mild to moderate increase in pain for a few days following the injection.  The lidocaine (local numbing medicine) will wear off in several hours. It usually takes 3-5 days for the steroid medication to start working although it may take up to 14 days for full effect.   You may use ice packs for 10-15 minutes, 3 to 4 times a day at the injection site for comfort if needed  You may use extra strength Tylenol for pain control if necessary   If you were fasting, you may resume your normal diet and medications after the procedure  If you have diabetes, your blood sugar may be higher than normal for 10-14 days following a steroid injection.  Contact your doctor who manages your diabetes if your blood sugar is significantly higher than usual    If you experience any of the following, call Sports Medicine @ [phone]  -Fever over 100 degrees F  -Swelling, bleeding, redness, drainage, warmth at the injection site  -New or significant worsening pain

## 2024-10-25 NOTE — LETTER
10/25/2024      Ginger Marshall  [address]  [facility] [address]      Dear Colleague,    Thank you for referring your patient, Ginger Marshall, to the [facility] [place]. Please see a copy of my visit note below.    ASSESSMENT & PLAN    Ginger was seen today for pain.    Diagnoses and all orders for this visit:    Osteoarthritis of left glenohumeral joint  -     Physical Therapy  Referral; Future    S/P left rotator cuff repair  -     Physical Therapy  Referral; Future    Tendinitis of left rotator cuff  -     Physical Therapy  Referral; Future    Other orders  -     Large Joint Injection/Arthocentesis: L subacromial bursa      84-year-old female presents to follow-up on chronic left shoulder pain. She reports a remote history of left rotator cuff repair, also has rheumatoid arthritis. On exam today, she has significant weakness and pain in essentially all planes of motion of her shoulder, some generalized tenderness to palpation.  Suspect rotator cuff related pathology as primary source of her pain, though she also has changes of her glenohumeral joint.  Will offer her ultrasound-guided subacromial bursa injection, as her last injection only gave her very temporary relief and send her to physical therapy.      Plan:  -Start PT and home exercise program   -Repeat left subacromial bursa injection in clinic today under US guidance. Could consider glenohumeral joint injection in the future  -Tylenol Extra Strength (1000mg) up to three times daily as needed for pain  -If no improvement, consider MRI and/or surgical referral     Dr. Mecca Ruvalcaba, DO  [facility]  Sports Medicine     -----  Chief Complaint   Patient presents with     Left Shoulder - Pain       SUBJECTIVE  Ginger Marshall is a/an 84 year old female who is seen to follow-up on left shoulder pain. The patient was last seen by Patsy Rodriguez PA-C on 07/18/24.  "Patient received a left shoulder subacromial bursa steroid injection on 07/17/24 that provided a few days of relief. Since last visit, they report increased pain. They have been using ibuprofen, lidocaine patches, and heat.     The patient is seen alone        REVIEW OF SYSTEMS:  Pertinent positives/negative: As stated above in HPI    OBJECTIVE:  BP (!) 145/75   Ht 1.651 m (5' 5\")   Wt 98.9 kg (218 lb)   BMI 36.28 kg/m     General: Alert and in no distress  Skin: no visable rashes  CV: Extremities appear well perfused   Resp: normal respiratory effort, no conversational dyspnea   Psych: normal mood, affect  MSK:  Left Shoulder Exam:  Appearance: Symmetric shoulder heights, No scapular winging observed  Palpation: Tender to palpation of biceps tendon  Rotator cuff strength:    Supraspinatus: 3/5   ER: 3/5   IR: 4/5  AROM: -Limited by pain   Forward flexion: 90   Abduction: 90   ER at side: 90   IR: To belt line  PROM to 170 of flexion and 160 abduction  Special tests: + for Empty can and Mullins       RADIOLOGY:  Final results and radiologist's interpretation available in the [facility] health record.  Images below were personally reviewed and discussed with the patient in the office today.  My personal interpretation of the performed imaging: No new imaging  L Shoulder XR 7/17/24  IMPRESSION: Moderate degenerative changes glenohumeral joint. Bones  are demineralized. There is no acute fracture or dislocation.  Deformity of the AC joint is either related to prior trauma or  surgery. Multiple left-sided rib fractures, there are subacute to  chronic.    Large Joint Injection/Arthocentesis: L subacromial bursa    Date/Time: 10/25/2024 1:17 PM    Performed by: Mecca Ruvalcaba DO  Authorized by: Mecca Ruvalcaba DO    Indications:  Pain  Needle Size:  25 G  Guidance: ultrasound    Approach:  Anterolateral  Location:  Shoulder      Site:  L subacromial bursa  Medications:  40 mg triamcinolone 40 MG/ML; 4 mL " lidocaine 1 %  Medications comment:  1ml of 8.4% Sodium Bicarbonate solution was used to buffer the local numbing agent for today's injection    Outcome:  Tolerated well, no immediate complications  Procedure discussed: discussed risks, benefits, and alternatives    Consent Given by:  Patient  Timeout: timeout called immediately prior to procedure    Prep: patient was prepped and draped in usual sterile fashion     Ultrasound was used to ensure safe and accurate needle placement and injection. Ultrasound images of the procedure were permanently stored.                     Disclaimer: This note consists of symbols derived from dictation and/or voice recognition software. As a result, there may be errors in the script that have gone undetected. Please consider this when interpreting information found in this chart.        Again, thank you for allowing me to participate in the care of your patient.        Sincerely,        Mecca Ruvalcaba, DO

## 2024-10-25 NOTE — PROGRESS NOTES
ASSESSMENT & PLAN    Ginger was seen today for pain.    Diagnoses and all orders for this visit:    Osteoarthritis of left glenohumeral joint  -     Physical Therapy  Referral; Future    S/P left rotator cuff repair  -     Physical Therapy  Referral; Future    Tendinitis of left rotator cuff  -     Physical Therapy  Referral; Future    Other orders  -     Large Joint Injection/Arthocentesis: L subacromial bursa      84-year-old female presents to follow-up on chronic left shoulder pain. She reports a remote history of left rotator cuff repair, also has rheumatoid arthritis. On exam today, she has significant weakness and pain in essentially all planes of motion of her shoulder, some generalized tenderness to palpation.  Suspect rotator cuff related pathology as primary source of her pain, though she also has changes of her glenohumeral joint.  Will offer her ultrasound-guided subacromial bursa injection, as her last injection only gave her very temporary relief and send her to physical therapy.      Plan:  -Start PT and home exercise program   -Repeat left subacromial bursa injection in clinic today under US guidance. Could consider glenohumeral joint injection in the future  -Tylenol Extra Strength (1000mg) up to three times daily as needed for pain  -If no improvement, consider MRI and/or surgical referral     Dr. Mecca Ruvalcaba, DO  [facility]  Sports Medicine     -----  Chief Complaint   Patient presents with    Left Shoulder - Pain       SUBJECTIVE  Ginger Marshall is a/an 84 year old female who is seen to follow-up on left shoulder pain. The patient was last seen by Patsy Rodriguez PA-C on 07/18/24. Patient received a left shoulder subacromial bursa steroid injection on 07/17/24 that provided a few days of relief. Since last visit, they report increased pain. They have been using ibuprofen, lidocaine patches, and heat.     The patient is seen  "alone        REVIEW OF SYSTEMS:  Pertinent positives/negative: As stated above in HPI    OBJECTIVE:  BP (!) 145/75   Ht 1.651 m (5' 5\")   Wt 98.9 kg (218 lb)   BMI 36.28 kg/m     General: Alert and in no distress  Skin: no visable rashes  CV: Extremities appear well perfused   Resp: normal respiratory effort, no conversational dyspnea   Psych: normal mood, affect  MSK:  Left Shoulder Exam:  Appearance: Symmetric shoulder heights, No scapular winging observed  Palpation: Tender to palpation of biceps tendon  Rotator cuff strength:    Supraspinatus: 3/5   ER: 3/5   IR: 4/5  AROM: -Limited by pain   Forward flexion: 90   Abduction: 90   ER at side: 90   IR: To belt line  PROM to 170 of flexion and 160 abduction  Special tests: + for Empty can and Mullins       RADIOLOGY:  Final results and radiologist's interpretation available in the [facility] health record.  Images below were personally reviewed and discussed with the patient in the office today.  My personal interpretation of the performed imaging: No new imaging  L Shoulder XR 7/17/24  IMPRESSION: Moderate degenerative changes glenohumeral joint. Bones  are demineralized. There is no acute fracture or dislocation.  Deformity of the AC joint is either related to prior trauma or  surgery. Multiple left-sided rib fractures, there are subacute to  chronic.    Large Joint Injection/Arthocentesis: L subacromial bursa    Date/Time: 10/25/2024 1:17 PM    Performed by: Mecca Ruvalcaba DO  Authorized by: Mecca Ruvalcaba DO    Indications:  Pain  Needle Size:  25 G  Guidance: ultrasound    Approach:  Anterolateral  Location:  Shoulder      Site:  L subacromial bursa  Medications:  40 mg triamcinolone 40 MG/ML; 4 mL lidocaine 1 %  Medications comment:  1ml of 8.4% Sodium Bicarbonate solution was used to buffer the local numbing agent for today's injection    Outcome:  Tolerated well, no immediate complications  Procedure discussed: discussed risks, benefits, and " alternatives    Consent Given by:  Patient  Timeout: timeout called immediately prior to procedure    Prep: patient was prepped and draped in usual sterile fashion     Ultrasound was used to ensure safe and accurate needle placement and injection. Ultrasound images of the procedure were permanently stored.                     Disclaimer: This note consists of symbols derived from dictation and/or voice recognition software. As a result, there may be errors in the script that have gone undetected. Please consider this when interpreting information found in this chart.

## 2024-10-30 DIAGNOSIS — K21.9 GASTROESOPHAGEAL REFLUX DISEASE, UNSPECIFIED WHETHER ESOPHAGITIS PRESENT: ICD-10-CM

## 2024-10-30 ASSESSMENT — ACTIVITIES OF DAILY LIVING (ADL)
PLEASE_INDICATE_YOR_PRIMARY_REASON_FOR_REFERRAL_TO_THERAPY:: SHOULDER
TOUCHING_THE_BACK_OF_YOUR_NECK: 3
REMOVING_SOMETHING_FROM_YOUR_BACK_POCKET: 8
PUTTING_ON_AN_UNDERSHIRT_OR_A_PULLOVER_SWEATER: 3
WASHING_YOUR_HAIR?: 3
PUSHING_WITH_THE_INVOLVED_ARM: 3
PUTTING_ON_YOUR_PANTS: 7
WHEN_LYING_ON_THE_INVOLVED_SIDE: 4
AT_ITS_WORST?: 8
PUTTING_ON_A_SHIRT_THAT_BUTTONS_DOWN_THE_FRONT: 3
REACHING_FOR_SOMETHING_ON_A_HIGH_SHELF: 8
CARRYING_A_HEAVY_OBJECT_OF_10_POUNDS: 7
PLACING_AN_OBJECT_ON_A_HIGH_SHELF: 7
WASHING_YOUR_BACK: 5

## 2024-10-31 ENCOUNTER — THERAPY VISIT (OUTPATIENT)
Dept: PHYSICAL THERAPY | Facility: CLINIC | Age: 84
End: 2024-10-31
Attending: STUDENT IN AN ORGANIZED HEALTH CARE EDUCATION/TRAINING PROGRAM
Payer: COMMERCIAL

## 2024-10-31 DIAGNOSIS — Z98.890 S/P LEFT ROTATOR CUFF REPAIR: ICD-10-CM

## 2024-10-31 DIAGNOSIS — M19.012 OSTEOARTHRITIS OF LEFT GLENOHUMERAL JOINT: ICD-10-CM

## 2024-10-31 DIAGNOSIS — M75.82 TENDINITIS OF LEFT ROTATOR CUFF: ICD-10-CM

## 2024-10-31 PROCEDURE — 97110 THERAPEUTIC EXERCISES: CPT | Mod: GP | Performed by: PHYSICAL THERAPIST

## 2024-10-31 PROCEDURE — 97161 PT EVAL LOW COMPLEX 20 MIN: CPT | Mod: GP | Performed by: PHYSICAL THERAPIST

## 2024-10-31 RX ORDER — PANTOPRAZOLE SODIUM 40 MG/1
40 TABLET, DELAYED RELEASE ORAL
Qty: 90 TABLET | Refills: 0 | Status: SHIPPED | OUTPATIENT
Start: 2024-10-31

## 2024-11-06 DIAGNOSIS — A60.04 HERPES SIMPLEX VULVOVAGINITIS: ICD-10-CM

## 2024-11-06 RX ORDER — VALACYCLOVIR HYDROCHLORIDE 500 MG/1
500 TABLET, FILM COATED ORAL 2 TIMES DAILY
Qty: 180 TABLET | Refills: 0 | Status: SHIPPED | OUTPATIENT
Start: 2024-11-06

## 2024-11-07 ENCOUNTER — OFFICE VISIT (OUTPATIENT)
Dept: URGENT CARE | Facility: URGENT CARE | Age: 84
End: 2024-11-07
Payer: COMMERCIAL

## 2024-11-07 VITALS
SYSTOLIC BLOOD PRESSURE: 148 MMHG | OXYGEN SATURATION: 96 % | HEART RATE: 86 BPM | TEMPERATURE: 102 F | DIASTOLIC BLOOD PRESSURE: 73 MMHG

## 2024-11-07 DIAGNOSIS — R05.9 COUGH, UNSPECIFIED TYPE: ICD-10-CM

## 2024-11-07 DIAGNOSIS — B34.9 VIRAL SYNDROME: ICD-10-CM

## 2024-11-07 DIAGNOSIS — R50.9 FEVER, UNSPECIFIED FEVER CAUSE: ICD-10-CM

## 2024-11-07 DIAGNOSIS — R07.0 THROAT PAIN: Primary | ICD-10-CM

## 2024-11-07 LAB
DEPRECATED S PYO AG THROAT QL EIA: NEGATIVE
FLUAV AG SPEC QL IA: NEGATIVE
FLUBV AG SPEC QL IA: NEGATIVE

## 2024-11-07 PROCEDURE — 87651 STREP A DNA AMP PROBE: CPT | Performed by: PHYSICIAN ASSISTANT

## 2024-11-07 PROCEDURE — 87804 INFLUENZA ASSAY W/OPTIC: CPT | Performed by: PHYSICIAN ASSISTANT

## 2024-11-07 PROCEDURE — 99214 OFFICE O/P EST MOD 30 MIN: CPT | Performed by: PHYSICIAN ASSISTANT

## 2024-11-07 RX ORDER — BENZONATATE 100 MG/1
100 CAPSULE ORAL 3 TIMES DAILY PRN
Qty: 21 CAPSULE | Refills: 0 | Status: SHIPPED | OUTPATIENT
Start: 2024-11-07

## 2024-11-07 RX ORDER — ACETAMINOPHEN 500 MG
500-1000 TABLET ORAL EVERY 6 HOURS PRN
Status: SHIPPED
Start: 2024-11-07

## 2024-11-07 RX ORDER — IBUPROFEN 200 MG
200 TABLET ORAL ONCE
Status: COMPLETED | OUTPATIENT
Start: 2024-11-07 | End: 2024-11-07

## 2024-11-07 RX ADMIN — Medication 200 MG: at 19:55

## 2024-11-08 LAB — GROUP A STREP BY PCR: NOT DETECTED

## 2024-11-08 NOTE — PROGRESS NOTES
Assessment & Plan     Throat pain    You had a strep test done today that was neg.  We will perform a strep culture and if any positive results come back we will call you and call in antibiotics.  At this time, this appears to be a viral infection and requires symptomatic treatment.  Most viral sore throats will resolve with in a few days.  If your throat pain worsens then please be  rechecked in the  or by your PCP.    Strep neg, culture pending  Tylenol for throat pain  Phenol for throat pain    - Streptococcus A Rapid Screen w/Reflex to PCR  - Group A Streptococcus PCR Throat Swab  - phenol (CHLORASEPTIC) 1.4 % spray  Dispense: 236 mL; Refill: 0  - acetaminophen (TYLENOL) 500 MG tablet    Fever, unspecified fever cause    A fever is a high body temperature and is the body's reaction to an illness. It's one way your body fights illness. A temperature of up to 102 F can be helpful, because it helps the body respond to infection. Most healthy people can have a fever as high as 103 F to 104 F for short periods of time without problems.  Its important to stay well hydrated with a fever and avoid being in the heat.  A fever can be treated with medications provided, but If symptoms worsen then be seen by your PCP or go to the .     Influenza neg  Strep neg    Tylenol for fever  Stay hydrated   - Influenza A & B Antigen  - ibuprofen (ADVIL/MOTRIN) tablet 200 mg  - acetaminophen (TYLENOL) 500 MG tablet    Viral syndrome    You don't feel well, but it's not clear what's causing it. You may have a viral infection. Viruses cause many illnesses, such as the common cold, influenza, fever, rashes, and the diarrhea, nausea, and vomiting that are symptoms of a stomach infection. You may wonder if antibiotic medicines could make you feel better. But antibiotics only treat infections caused by bacteria. They don't work on viruses.  The good news is that viral infections usually aren't serious. Most will go away in a few days  without medical treatment. In the meantime, there are a few things you can do to make yourself more comfortable.  Follow-up care is a key part of your treatment and safety    Cough, unspecified type    Tessalon for coughing  - benzonatate (TESSALON) 100 MG capsule  Dispense: 21 capsule; Refill: 0       At today's visit with Ginger Marshall , we discussed results, diagnosis, medications and formulated a plan.  We also discussed red flags for immediate return to clinic/ER, as well as indications for follow up with PCP if not improved in 3 days. Patient understood and agreed to plan. Ginger Marshall was discharged with stable vitals and has no further questions.       No follow-ups on file.    Kodi Quesada, [facility], PA-MARYAM  [facility] MIAN Miles is a 84 year old female who presents to clinic today for the following health issues:  Chief Complaint   Patient presents with    Urgent Care     Sore throat, bad cough, nose is plugged up and congestion. Head aches. Is hot and cold. Body aches. Symptoms started yesterday. 101 temp at 3pm today.        HPI  Review of Systems  Constitutional, HEENT, cardiovascular, pulmonary, GI, , musculoskeletal, neuro, skin, endocrine and psych systems are negative, except as otherwise noted.      Objective    BP (!) 148/73 (BP Location: Right arm)   Pulse 86   Temp (!) 102  F (38.9  C) (Tympanic)   SpO2 96%   Physical Exam   GENERAL: alert and no distress  EYES: Eyes grossly normal to inspection, PERRL and conjunctivae and sclerae normal  HENT: normal cephalic/atraumatic, ear canals and TM's normal, nasal mucosa edematous , rhinorrhea clear, oropharynx clear, and oral mucous membranes moist  NECK: no adenopathy, no asymmetry, masses, or scars  RESP: lungs clear to auscultation - no rales, rhonchi or wheezes  CV: regular rate and rhythm, normal S1 S2, no S3 or S4, no murmur, click or rub, no peripheral edema  ABDOMEN: soft, nontender, no hepatosplenomegaly,  no masses and bowel sounds normal  MS: no gross musculoskeletal defects noted, no edema  SKIN: no suspicious lesions or rashes  NEURO: Normal strength and tone, mentation intact and speech normal  PSYCH: mentation appears normal, affect normal/bright    Results for orders placed or performed in visit on 11/07/24   Influenza A & B Antigen     Status: Normal    Specimen: Nose; Swab   Result Value Ref Range    Influenza A antigen Negative Negative    Influenza B antigen Negative Negative    Narrative    Test results must be correlated with clinical data. If necessary, results should be confirmed by a molecular assay or viral culture.   Streptococcus A Rapid Screen w/Reflex to PCR     Status: Normal    Specimen: Throat; Swab   Result Value Ref Range    Group A Strep antigen Negative Negative

## 2024-11-15 ENCOUNTER — HOSPITAL ENCOUNTER (EMERGENCY)
Facility: CLINIC | Age: 84
Discharge: HOME | End: 2024-11-15
Attending: STUDENT IN AN ORGANIZED HEALTH CARE EDUCATION/TRAINING PROGRAM | Admitting: STUDENT IN AN ORGANIZED HEALTH CARE EDUCATION/TRAINING PROGRAM
Payer: COMMERCIAL

## 2024-11-15 VITALS
RESPIRATION RATE: 18 BRPM | SYSTOLIC BLOOD PRESSURE: 132 MMHG | HEART RATE: 80 BPM | HEIGHT: 66 IN | OXYGEN SATURATION: 97 % | DIASTOLIC BLOOD PRESSURE: 76 MMHG | BODY MASS INDEX: 33.27 KG/M2 | TEMPERATURE: 97 F | WEIGHT: 207 LBS

## 2024-11-15 DIAGNOSIS — S61.216A LACERATION OF RIGHT LITTLE FINGER WITHOUT FOREIGN BODY WITHOUT DAMAGE TO NAIL, INITIAL ENCOUNTER: Primary | ICD-10-CM

## 2024-11-15 PROCEDURE — 64450 NJX AA&/STRD OTHER PN/BRANCH: CPT

## 2024-11-15 PROCEDURE — 99283 EMERGENCY DEPT VISIT LOW MDM: CPT

## 2024-11-15 ASSESSMENT — COLUMBIA-SUICIDE SEVERITY RATING SCALE - C-SSRS
1. IN THE PAST MONTH, HAVE YOU WISHED YOU WERE DEAD OR WISHED YOU COULD GO TO SLEEP AND NOT WAKE UP?: NO
2. HAVE YOU ACTUALLY HAD ANY THOUGHTS OF KILLING YOURSELF IN THE PAST MONTH?: NO
6. HAVE YOU EVER DONE ANYTHING, STARTED TO DO ANYTHING, OR PREPARED TO DO ANYTHING TO END YOUR LIFE?: NO

## 2024-11-15 ASSESSMENT — ACTIVITIES OF DAILY LIVING (ADL): ADLS_ACUITY_SCORE: 0

## 2024-11-15 NOTE — ED PROVIDER NOTES
"  Emergency Department Note      History of Present Illness     Chief Complaint   Laceration    HPI   Ginger Marshall is a very pleasant 84 year old female with history of hyperlipidemia presenting with finger laceration. The patient reports accidentally cutting the tip of her right 5th finger off this morning while cutting carrots using a mandoline slicer. She ran her finger under tap water for few minutes and then wrapped it with a paper towel. She put the severed part of her finger in a plastic bag filled with water.  Bleeding was controlled. No blood thinner usage. Tetanus booster received 07/20/2019.    Independent Historian   None    Review of External Notes   None.    I personally reviewed the patient's chart, including available medication list and available past medical history, past surgical history, family history, and social history.    Physical Exam     Patient Vitals for the past 24 hrs:   BP Temp Temp src Pulse Resp SpO2 Height Weight   11/15/24 1112 132/76 97  F (36.1  C) Temporal 80 18 97 % 1.664 m (5' 5.5\") 93.9 kg (207 lb)     Physical Exam  Vitals and nursing note reviewed.   Constitutional:       Appearance: Normal appearance.   Musculoskeletal:         General: Signs of injury present. No deformity.      Right hand: Laceration present.        Hands:       Comments: Avulsion laceration on 5th digit of right hand without flap. No nail involvement.   Skin:     General: Skin is warm and dry.      Capillary Refill: Capillary refill takes less than 2 seconds.      Findings: Lesion present.   Neurological:      Mental Status: She is alert.      Sensory: No sensory deficit.      Motor: No weakness.           ED Course      Medications Administered   Medications - No data to display    Procedures   Procedures    Digital Block       Procedure: Digital Block    Indication: Wound care    Consent: Verbal    Preparation: Alcohol    Anesthesia: A digital block was performed with Lidocaine - 1% on the affected " digit.     Location: R fifth (pinky) finger    Procedure Detail: A digital block was performed on the indicated digit with the above anesthetic.     Patient status: The patient tolerated the procedure well: Yes. There were no complications.    Discussion of Management - See ED Course Below    ED Course   Independent Interpretation / Discussion of Management / Repeat Assessments  ED Course as of 11/15/24 1140   Fri Nov 15, 2024   1117 Tetanus booster 07/20/2019   1129 I obtained history and examined the patient as noted above.      Additional Documentation  None    Medical Decision Making / Diagnosis     MDM   This patient has a laceration to the right fifth finger.   Vital signs are reassuring.  There is not evidence of neurovascular injury.   Tetanus is up to date..  The wound was cleansed.  Laceration could not be closed.  A digital block was performed.  We dressed the wound with nonadhesive dressing.  Patient was provided with an aluminum splint for protection.    Plan for follow-up with primary care clinician  in 1 week for for reevaluation if needed.  Findings were discussed.  Additional verbal instructions were provided.  I discussed specific warning signs and instructed the patient to return to the emergency department if there are any concerns. Understanding of instructions was voiced, questions were answered and the patient was discharged.     Disposition   The patient was discharged.     Diagnosis     ICD-10-CM    1. Laceration of right little finger without foreign body without damage to nail, initial encounter  S61.216A         Discharge Medications  New Prescriptions    No medications on file     Scribe Disclosure:  I, Nehemiah Heath, am serving as a scribe at 11:38 AM on 11/15/2024 to document services personally performed by Mervin Bhatia MD, based on my observations and the provider's statements to me.       Mervin Bhatia MD  11/15/24 1140

## 2024-11-15 NOTE — DISCHARGE INSTRUCTIONS
Thank you for allowing us to evaluate you today.  Follow up with primary care clinician  in 1-2 weeks as needed..  For pain, use acetaminophen (Tylenol) and ibuprofen.  Keep the wound clean and dry.  Change the dressing daily as shown.  Please read the guidance provided with your discharge instructions.  Immediately return to the emergency department with any concerns.

## 2024-11-15 NOTE — ED TRIAGE NOTES
Pt arrives with c/o avulsion to right 5th finger. Pt reports she was cutting carrots. Bleeding controlled PTA.     Triage Assessment (Adult)       Row Name 11/15/24 1101          Triage Assessment    Airway WDL WDL        Respiratory WDL    Respiratory WDL WDL        Peripheral/Neurovascular WDL    Peripheral Neurovascular WDL WDL        Cognitive/Neuro/Behavioral WDL    Cognitive/Neuro/Behavioral WDL WDL

## 2024-11-18 ENCOUNTER — THERAPY VISIT (OUTPATIENT)
Dept: PHYSICAL THERAPY | Facility: CLINIC | Age: 84
End: 2024-11-18
Attending: STUDENT IN AN ORGANIZED HEALTH CARE EDUCATION/TRAINING PROGRAM
Payer: COMMERCIAL

## 2024-11-18 DIAGNOSIS — Z98.890 S/P LEFT ROTATOR CUFF REPAIR: Primary | ICD-10-CM

## 2024-11-18 DIAGNOSIS — M75.82 TENDINITIS OF LEFT ROTATOR CUFF: ICD-10-CM

## 2024-11-18 DIAGNOSIS — M19.012 OSTEOARTHRITIS OF LEFT GLENOHUMERAL JOINT: ICD-10-CM

## 2024-11-18 PROCEDURE — 97110 THERAPEUTIC EXERCISES: CPT | Mod: GP | Performed by: PHYSICAL THERAPIST

## 2024-11-19 ENCOUNTER — VIRTUAL VISIT (OUTPATIENT)
Dept: PSYCHOLOGY | Facility: CLINIC | Age: 84
End: 2024-11-19
Payer: COMMERCIAL

## 2024-11-19 DIAGNOSIS — F33.1 MODERATE EPISODE OF RECURRENT MAJOR DEPRESSIVE DISORDER (H): Primary | ICD-10-CM

## 2024-11-19 PROCEDURE — 90837 PSYTX W PT 60 MINUTES: CPT | Mod: 95

## 2024-11-19 NOTE — PROGRESS NOTES
[facility]                                     Progress Note    Patient Name: Ginger Marsahll  Date: 11/19/24         Service Type: Individual      Session Start Time: 9:03 am  Session End Time: 9:56 am     Session Length: 53 min    Session #: 21    Answers submitted by the patient for this visit:    Attendees: Client attended alone    Service Modality:  Video Visit:      Provider verified identity through the following two step process.  Patient provided:  Patient is known previously to provider    Telemedicine Visit: The patient's condition can be safely assessed and treated via synchronous audio and visual telemedicine encounter.      Reason for Telemedicine Visit: Patient convenience (e.g. access to timely appointments / distance to available provider)    Originating Site (Patient Location): Patient's home    Distant Site (Provider Location): Provider Remote Setting- Home Office    Consent:  The patient/guardian has verbally consented to: the potential risks and benefits of telemedicine (video visit) versus in person care; bill my insurance or make self-payment for services provided; and responsibility for payment of non-covered services.     Patient would like the video invitation sent by:  My Chart    Mode of Communication:  Video Conference via [facility]    Distant Location (Provider):  Off-site    As the provider I attest to compliance with applicable laws and regulations related to telemedicine.    DATA  Interactive Complexity: No     Crisis: No    Extended Session (53+ minutes): PROLONGED SERVICE IN THE OUTPATIENT SETTING REQUIRING DIRECT (FACE-TO-FACE) PATIENT CONTACT BEYOND THE USUAL SERVICE:    - Longer session due to limited access to mental health appointments and necessity to address patient's distress / complexity  Interactive Complexity: No  Crisis: No      Progress Since Last Session (Related to Symptoms / Goals / Homework):   Symptoms: Improving mood , low energy    Homework:  Partially completed      Episode of Care Goals: Minimal progress - ACTION (Actively working towards change); Intervened by reinforcing change plan / affirming steps taken     Current / Ongoing Stressors and Concerns:  Second hip replacement in December-current debilitating pain. Daughter providing in home care. Receiving [facility] for support, impatience with responses. Low quality sleep, stopped using CPAP.   Ongoing: Grieving loss of adult son who was blind and lived alone, fell and passed away, second loss of an adult/child. Regrets about parenting.      Treatment Objective(s) Addressed in This Session:    Practice boundaries and radical acceptance of reality regarding sister, sons, reality of accident  Patient will Increase interest, engagement, and pleasure in doing things  Decrease frequency and intensity of feeling down, depressed, hopeless  Improve quantity and quality of night time sleep / decrease daytime naps  Feel less tired and more energy during the day   Improve diet, appetite, mindful eating, and / or meal planning  Identify negative self-talk and behaviors: challenge core beliefs, myths, and actions  Improve concentration, focus, and mindfulness in daily activities   Feel less fidgety, restless or slow in daily activities / interpersonal interactions.     Intervention:  Supportive therapy: Provided active listening and validation. Reviewed grounding/mindfulness skills, coping skills, support seeking actions, acceptance of reality  EMDR: Reinforced calm safe state practice    Motivational Interviewing-MAINTAIN  Target Behavior:  radical acceptance of reality, gratitude , jena practices  Proactive communication with providers, [facility], attend appointments, pursue injectable medication for improved pain management    MI Intervention: Expressed Empathy/Understanding, Supported Autonomy, Collaboration, Evocation and Open-ended questions     Change Talk Expressed by the Patient: Desire to  change Ability to change Reasons to change Activation Taking steps    Provider Response to Change Talk: E - Evoked more info from patient about behavior change, A - Affirmed patient's thoughts, decisions, or attempts at behavior change, and R - Reflected patient's change talk    Assessments completed prior to visit:  LAURA  12/5/22  The following assessments were completed by patient for this visit:  PHQ2:       2/27/2023     8:50 AM 2/24/2023     6:48 AM 2/20/2023     5:39 PM 1/15/2023    11:54 AM 11/16/2022     1:33 PM 8/5/2022     8:11 AM 7/6/2022     4:18 PM   PHQ-2 ( 1999 [employer])   Q1: Little interest or pleasure in doing things 0 1  1  1  1  3     Q2: Feeling down, depressed or hopeless 0 1  1  1  1  0     PHQ-2 Score 0 2 2 2 2 3    Q1: Little interest or pleasure in doing things  Several days Several days Several days Several days Nearly every day    Q2: Feeling down, depressed or hopeless  Several days Several days Several days Several days Not at all    PHQ-2 Score  2 2 2 2 3 Incomplete       Patient-reported     PHQ9:       6/25/2024     7:12 AM 7/7/2024     7:30 AM 8/12/2024     1:53 PM 8/19/2024     9:41 AM 9/27/2024     8:38 AM 10/7/2024     7:09 PM 11/18/2024     9:10 PM   PHQ-9 SCORE   PHQ-9 Total Score MyChart 9 (Mild depression) 6 (Mild depression) 10 (Moderate depression) 11 (Moderate depression)  3 (Minimal depression) 5 (Mild depression)   PHQ-9 Total Score 9 6 10 11 8 3 5        Patient-reported     Patient Health Questionnaire (Submitted on 9/26/2023)  If you checked off any problems, how difficult have these problems made it for you to do your work, take care of things at home, or get along with other people?: Somewhat difficult  PHQ9 TOTAL SCORE: 4  GAD2:       2/16/2024     8:34 AM 3/13/2024     6:28 PM 4/21/2024     1:25 PM 7/7/2024     7:31 AM 8/19/2024     9:42 AM 10/4/2024     6:36 AM 11/18/2024     9:10 PM   SPRING-2   Feeling nervous, anxious, or on edge 1  1  0  0  0  0  0    Not being able  to stop or control worrying 0  0  0  0  0  0  0    SPRING-2 Total Score 1 1 0 0 0 0 0        Patient-reported     GAD7:       7/6/2022     4:18 PM 8/5/2022     8:11 AM 11/16/2022     1:33 PM 6/5/2023     2:31 PM 2/28/2024    10:14 AM 4/2/2024     9:05 PM 5/31/2024     9:14 AM   SPRING-7 SCORE   Total Score 2 (minimal anxiety) 5 (mild anxiety) 2 (minimal anxiety)  5 (mild anxiety) 1 (minimal anxiety) 1 (minimal anxiety)   Total Score 2 5 2 3 5 1 1     CAGE-AID:       12/5/2022     9:28 AM   CAGE-AID Total Score   Total Score 0   Total Score MyChart 0 (A total score of 2 or greater is considered clinically significant)     PROMIS 10-Global Health (only subscores and total score):       12/5/2022     9:28 AM 3/12/2023    10:38 AM 8/23/2023     8:34 AM 12/14/2023     6:28 AM 3/13/2024     6:31 PM 7/7/2024     7:34 AM 9/1/2024    11:20 AM   PROMIS-10 Scores Only   Global Mental Health Score 8    8 8    8 12 13 11 13 10   Global Physical Health Score 13    13 11    11 12 12 11 12 9   PROMIS TOTAL - SUBSCORES 21    21 19    19 24 25 22 25 19     [place] Suicide Severity Rating Scale (Lifetime/Recent)      12/5/2022    11:00 AM 11/15/2024    11:11 AM   [place] Suicide Severity Rating (Lifetime/Recent)   Q1 Wished to be Dead (Past Month) no 0-->no   Q2 Suicidal Thoughts (Past Month) no 0-->no   Q3 Suicidal Thought Method no    Q4 Suicidal Intent without Specific Plan no    Q5 Suicide Intent with Specific Plan no    Q6 Suicide Behavior (Lifetime) no 0-->no   Level of Risk per Screen low risk no risks indicated         ASSESSMENT: Current Emotional / Mental Status (status of significant symptoms):   Risk status (Self / Other harm or suicidal ideation)   Patient denies current fears or concerns for personal safety.   Patient denies current or recent suicidal ideation or behaviors.   Patient denies current or recent homicidal ideation or behaviors.   Patient denies current or recent self injurious behavior or ideation.   Patient  denies other safety concerns.   Patient reports there has been no change in risk factors since their last session.     Patient reports there has been no change in protective factors since their last session.     Recommended that patient call 911 or go to the local ED should there be a change in any of these risk factors.     Appearance:   Appropriate    Eye Contact:   Good    Psychomotor Behavior: Normal    Attitude:   Pleasant Attentive   Orientation:   All   Speech    Rate / Production: Normal     Volume:  Normal    Mood:    Normal depressed   Affect:    Appropriate    Thought Content:  Clear    Thought Form:  Coherent  Logical    Insight:    Good      Medication Review:   No changes to current psychiatric medication(s)     Medication Compliance:   Yes     Changes in Health Issues:   None reported     Chemical Use Review:   Substance Use: Chemical use reviewed, no active concerns identified      Tobacco Use: No current tobacco use.      Diagnosis:  1. Moderate episode of recurrent major depressive disorder (H)          Collateral Reports Completed:   Not Applicable    PLAN: (Patient Tasks / Therapist Tasks / Other)   Embrace radical acceptance and boundaries. Use proactive communication with providers, family, try grounding /mindfulness, loving kindness toward self, use EMDR cue word CHAIR. Consider stomach administered med option    [facility] Arenas [facility] 11/19/2024                                                                                                  Individual Treatment Plan    Patient's Name: Ginger Marshall  YOB: 1940    Date of Creation: 2/8/23  Date Treatment Plan Last Reviewed/Revised:   8/19/24    DSM5 Diagnoses: 296.31 (F33.0) Major Depressive Disorder, Recurrent Episode, Mild With anxious distress  Psychosocial / Contextual Factors: aging, losses, generational trauma  PROMIS (reviewed every 90 days):   21 12/5/22    Referral / Collaboration:  Referral to another  professional/service is not indicated at this time..    Anticipated number of session for this episode of care: 6-9 sessions  Anticipation frequency of session: Every other week  Anticipated Duration of each session: 53 or more minutes  Treatment plan will be reviewed in 90 days or when goals have been changed.     MeasurableTreatment Goal(s) related to diagnosis / functional impairment(s)  Goal 1: Patient will experience an improvement in mood and functioning as evidenced by assessment scores, self report and clinician observation    I will know I've met my goal when things feel better (paraphrase).      Objective #A (Patient Action)    Patient will increase understanding of steps in the grief process   Talk to others about losses  Use prayer practices and jena community to support well being.   Practice boundaries and radical acceptance of reality regarding sister sons, reality of accident   Engage in social activities at [facility]  Status: 8/19/24    Intervention(s)  Therapist will teach CBT, DBT  and support grief processing skills, prayer practices .    Objective #B  Patient will Increase interest, engagement, and pleasure in doing things  Decrease frequency and intensity of feeling down, depressed, hopeless  Improve quantity and quality of night time sleep / decrease daytime naps  Feel less tired and more energy during the day   Improve diet, appetite, mindful eating, and / or meal planning  Identify negative self-talk and behaviors: challenge core beliefs, myths, and actions  Improve concentration, focus, and mindfulness in daily activities   Feel less fidgety, restless or slow in daily activities / interpersonal interactions.  Status: 8/19/2024    Intervention(s)  Therapist will  teach cbt, dbt,MI, mindfulness and behavioral activation and assign homework .      Patient has reviewed and agreed to the above plan.      [facility]  8/19/2024

## 2024-11-20 ENCOUNTER — OFFICE VISIT (OUTPATIENT)
Dept: FAMILY MEDICINE | Facility: CLINIC | Age: 84
End: 2024-11-20
Payer: COMMERCIAL

## 2024-11-20 VITALS
TEMPERATURE: 97.9 F | SYSTOLIC BLOOD PRESSURE: 104 MMHG | OXYGEN SATURATION: 96 % | RESPIRATION RATE: 16 BRPM | DIASTOLIC BLOOD PRESSURE: 65 MMHG | HEIGHT: 66 IN | HEART RATE: 85 BPM | BODY MASS INDEX: 33.49 KG/M2 | WEIGHT: 208.4 LBS

## 2024-11-20 DIAGNOSIS — I05.0 RHEUMATIC MITRAL STENOSIS: ICD-10-CM

## 2024-11-20 DIAGNOSIS — Z01.818 PREOP GENERAL PHYSICAL EXAM: Primary | ICD-10-CM

## 2024-11-20 DIAGNOSIS — Z79.899 HIGH RISK MEDICATION USE: ICD-10-CM

## 2024-11-20 DIAGNOSIS — M16.12 ARTHRITIS OF LEFT HIP: ICD-10-CM

## 2024-11-20 DIAGNOSIS — Z13.1 SCREENING FOR DIABETES MELLITUS: ICD-10-CM

## 2024-11-20 DIAGNOSIS — M05.79 RHEUMATOID ARTHRITIS INVOLVING MULTIPLE SITES WITH POSITIVE RHEUMATOID FACTOR (H): ICD-10-CM

## 2024-11-20 LAB
BASOPHILS # BLD AUTO: 0 10E3/UL (ref 0–0.2)
BASOPHILS NFR BLD AUTO: 0 %
EOSINOPHIL # BLD AUTO: 0.2 10E3/UL (ref 0–0.7)
EOSINOPHIL NFR BLD AUTO: 3 %
ERYTHROCYTE [DISTWIDTH] IN BLOOD BY AUTOMATED COUNT: 14.1 % (ref 10–15)
ERYTHROCYTE [SEDIMENTATION RATE] IN BLOOD BY WESTERGREN METHOD: 29 MM/HR (ref 0–30)
EST. AVERAGE GLUCOSE BLD GHB EST-MCNC: 114 MG/DL
HBA1C MFR BLD: 5.6 % (ref 0–5.6)
HCT VFR BLD AUTO: 37.5 % (ref 35–47)
HGB BLD-MCNC: 12.5 G/DL (ref 11.7–15.7)
IMM GRANULOCYTES # BLD: 0.1 10E3/UL
IMM GRANULOCYTES NFR BLD: 1 %
LYMPHOCYTES # BLD AUTO: 1.2 10E3/UL (ref 0.8–5.3)
LYMPHOCYTES NFR BLD AUTO: 22 %
MCH RBC QN AUTO: 34.5 PG (ref 26.5–33)
MCHC RBC AUTO-ENTMCNC: 33.3 G/DL (ref 31.5–36.5)
MCV RBC AUTO: 104 FL (ref 78–100)
MONOCYTES # BLD AUTO: 0.8 10E3/UL (ref 0–1.3)
MONOCYTES NFR BLD AUTO: 16 %
NEUTROPHILS # BLD AUTO: 3.1 10E3/UL (ref 1.6–8.3)
NEUTROPHILS NFR BLD AUTO: 58 %
PLATELET # BLD AUTO: 350 10E3/UL (ref 150–450)
RBC # BLD AUTO: 3.62 10E6/UL (ref 3.8–5.2)
WBC # BLD AUTO: 5.3 10E3/UL (ref 4–11)

## 2024-11-20 PROCEDURE — 93000 ELECTROCARDIOGRAM COMPLETE: CPT | Performed by: NURSE PRACTITIONER

## 2024-11-20 PROCEDURE — 36415 COLL VENOUS BLD VENIPUNCTURE: CPT | Performed by: NURSE PRACTITIONER

## 2024-11-20 PROCEDURE — 83036 HEMOGLOBIN GLYCOSYLATED A1C: CPT | Performed by: NURSE PRACTITIONER

## 2024-11-20 PROCEDURE — 82565 ASSAY OF CREATININE: CPT | Performed by: NURSE PRACTITIONER

## 2024-11-20 PROCEDURE — 85652 RBC SED RATE AUTOMATED: CPT | Performed by: NURSE PRACTITIONER

## 2024-11-20 PROCEDURE — 99214 OFFICE O/P EST MOD 30 MIN: CPT | Performed by: NURSE PRACTITIONER

## 2024-11-20 PROCEDURE — 85025 COMPLETE CBC W/AUTO DIFF WBC: CPT | Performed by: NURSE PRACTITIONER

## 2024-11-20 PROCEDURE — 86140 C-REACTIVE PROTEIN: CPT | Performed by: NURSE PRACTITIONER

## 2024-11-20 PROCEDURE — 80076 HEPATIC FUNCTION PANEL: CPT | Performed by: NURSE PRACTITIONER

## 2024-11-20 ASSESSMENT — PAIN SCALES - GENERAL: PAINLEVEL_OUTOF10: MILD PAIN (2)

## 2024-11-20 NOTE — NURSING NOTE
"Chief Complaint   Patient presents with    Pre-Op Exam     Initial /65 (BP Location: Right arm, Patient Position: Sitting, Cuff Size: Adult Large)   Pulse 85   Temp 97.9  F (36.6  C) (Oral)   Resp 16   Ht 1.664 m (5' 5.5\")   Wt 94.5 kg (208 lb 6.4 oz)   SpO2 96%   BMI 34.15 kg/m   Estimated body mass index is 34.15 kg/m  as calculated from the following:    Height as of this encounter: 1.664 m (5' 5.5\").    Weight as of this encounter: 94.5 kg (208 lb 6.4 oz).  BP completed using cuff size large right arm    Lisa Magill, CMA    "

## 2024-11-20 NOTE — PROGRESS NOTES
Preoperative Evaluation  [facility]  [address] [facility] [address]  Phone: [phone]  Primary Provider: Duy Leyva MD  Pre-op Performing Provider: AKIRA Sorensen CNP  Nov 20, 2024   {Provider  Link to PREOP SmartSet  REQUIRED to apply standard patient instructions and medication directions to the AVS :805725}  {ROOMER review and update patient entered surgical information if needed :687542}        11/15/2024   Surgical Information   What procedure is being done? Total Left hip Arthroplasty    Facility or Hospital where procedure/surgery will be performed: [facility]    Who is doing the procedure / surgery? Eugenio Jacobs    Date of surgery / procedure: 12/13/24    Time of surgery / procedure: 12:15 pm    Where do you plan to recover after surgery? at home with family        Patient-reported     Fax number for surgical facility: Note does not need to be faxed, will be available electronically in Epic.    Assessment & Plan     The proposed surgical procedure is considered INTERMEDIATE risk.    ICD-10-CM    1. Preop general physical exam  Z01.818 EKG 12-lead complete w/read - Clinics      2. Arthritis of left hip  M16.12       3. Rheumatic mitral stenosis    Stable for years. I05.0       4. Screening for diabetes mellitus    A1c 5.6 Z13.1 Hemoglobin A1c     Hemoglobin A1c      5. Rheumatoid arthritis involving multiple sites with positive rheumatoid factor (H)    Labs per rheumatology M05.79 CBC with Platelets & Differential     Creatinine     Erythrocyte sedimentation rate auto     CRP inflammation     Hepatic function panel      6. High risk medication use    Labs per rheumatology Z79.899 CBC with Platelets & Differential     Creatinine     Erythrocyte sedimentation rate auto     CRP inflammation     Hepatic function panel                Risks and Recommendations  The patient has the following additional risks and recommendations for perioperative  complications:  Infection:   History of immune suppression.  Increased risk of infection, monitor closely    Preoperative Medication Instructions  Antiplatelet or Anticoagulation Medication Instructions   - Patient is on no antiplatelet or anticoagulation medications.    Additional Medication Instructions  Take all scheduled medications on the day of surgery EXCEPT for modifications listed below:   - Statins: Continue taking on the day of surgery.    - DMARDs Continue methotrexate as ordered and hold Rinvoq (Upadacitinib) for three days prior to surgery    - pregabalin, gabapentin: Continue without modification.   - SSRIs, SNRIs, TCAs, Antipsychotics: Continue without modification.    - rescue Inhaler: Continue PRN. Bring to hospital on the day of surgery.    Recommendation  Approval given to proceed with proposed procedure, without further diagnostic evaluation.    Alejandro Miles is a 84 year old, presenting for the following:  Pre-Op Exam          11/20/2024    10:15 AM   Additional Questions   Roomed by Lisa Magill, CMA   Accompanied by self         11/20/2024    10:15 AM   Patient Reported Additional Medications   Patient reports taking the following new medications NONE     HPI related to upcoming procedure: Left hip arthritis.  Longstanding history of low back pain that was also determined to be related to left hip arthritis. Osteoarthritis left hip confirmed on X-ray.  S/P right hip replacement and bilateral total knee replacements.        11/15/2024   Pre-Op Questionnaire   Have you ever had a heart attack or stroke? No    Have you ever had surgery on your heart or blood vessels, such as a stent placement, a coronary artery bypass, or surgery on an artery in your head, neck, heart, or legs? No    Do you have chest pain with activity? No    Do you have a history of heart failure? No    Do you currently have a cold, bronchitis or symptoms of other infection? No    Do you have a cough, shortness of breath,  or wheezing? (!) YES    Do you or anyone in your family have previous history of blood clots? No    Do you or does anyone in your family have a serious bleeding problem such as prolonged bleeding following surgeries or cuts? No    Have you ever had problems with anemia or been told to take iron pills? (!) YES    Have you had any abnormal blood loss such as black, tarry or bloody stools, or abnormal vaginal bleeding? No    Have you ever had a blood transfusion? (!) YES    Have you ever had a transfusion reaction? No    Are you willing to have a blood transfusion if it is medically needed before, during, or after your surgery? Yes    Have you or any of your relatives ever had problems with anesthesia? (!) YES-patient with N/V after anesthesia years ago, has not occurred with recent surgeries.    Do you have sleep apnea, excessive snoring or daytime drowsiness? (!) YES    Do you have a CPAP machine? Yes    Do you have any artifical heart valves or other implanted medical devices like a pacemaker, defibrillator, or continuous glucose monitor? No    Do you have artificial joints? (!) YES -R hip, bilateral knee replacements, R two metacarpal joints    Are you allergic to latex? (!) YES        Patient-reported     Health Care Directive  Patient has a Health Care Directive on file      Preoperative Review of    reviewed - controlled substances reflected in medication list.  {Review MNPMP for all patients per [employer] MNPMP Profile:200518}        Patient Active Problem List    Diagnosis Date Noted    S/P left rotator cuff repair 10/31/2024     Priority: Medium    Osteoarthritis of left glenohumeral joint 10/31/2024     Priority: Medium    Tendinitis of left rotator cuff 10/31/2024     Priority: Medium    Lumbosacral radiculopathy 09/11/2024     Priority: Medium    Lumbosacral spondylosis without myelopathy 09/11/2024     Priority: Medium    Muscle pain 09/11/2024     Priority: Medium    Post-laminectomy syndrome 09/11/2024      Priority: Medium    Herpes simplex vulvovaginitis 02/29/2024     Priority: Medium     New, frequent outbreaks in setting of immunocompromise (methotrexate and upadacitinib tx for RA). Plan to start suppressive therapy for patient. Consider discussion with Rheumatologist.        Unspecified fall, subsequent encounter 02/15/2023     Priority: Medium    Muscle weakness (generalized) 02/15/2023     Priority: Medium    Other abnormalities of gait and mobility 02/15/2023     Priority: Medium    Rheumatoid arthritis with rheumatoid factor, unspecified (H) 02/15/2023     Priority: Medium    Thoracic spine pain 11/08/2022     Priority: Medium    Immunocompromised (H) 10/25/2022     Priority: Medium     2/2 RA pharmacotherapy with Upadacitinib, methotrexate      Morbid obesity (H) 08/12/2022     Priority: Medium    History of bisphosphonate therapy 10/13/2021     Priority: Medium    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 09/30/2021     Priority: Medium     Added automatically from request for surgery [id number]      Rheumatic mitral stenosis 05/26/2021     Priority: Medium    Moderate episode of recurrent major depressive disorder (H) 05/10/2021     Priority: Medium    Chronic right shoulder pain 09/16/2020     Priority: Medium    DDD (degenerative disc disease), lumbar 07/16/2019     Priority: Medium     Following with pain clinic, currently PT.  Was seen through [facility] in past with multiple LESIs. Last lumbar MRI in 2021 revealing severe DDD and moderate central stenosis. Does have decreased R hip flexion strength (4/5). Already completed at least 3 months of conservative treatments. Face-to-face visit today for lift chair and adjustable bed.     Restarting pregabalin for relief in 2024      Anxiety 06/20/2017     Priority: Medium    Allergic state, subsequent encounter 03/01/2017     Priority: Medium    Prediabetes 04/29/2016     Priority: Medium    MGD (meibomian gland dysfunction)  01/19/2016     Priority: Medium    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 01/04/2016     Priority: Medium     Longstanding hx of DENY (low normal MCV) since at least 2006 with normal subsequent EGD (neg H pylori)/colonoscopy in past (2011).   On iron supplementation intermittently since that time.     However, uptrending MCV towards macrocytosis since 2021 in association with low normal vitamin B12 level. Since restarted B12 supplement.  2024: Peripheral smear with normal retic count and unremarkable microscopy. Hemoglobin back into normal range.      Stenosis, spinal, lumbar 07/07/2015     Priority: Medium    Cornea guttata, ou 05/28/2015     Priority: Medium    Conjunctival concretions 05/28/2015     Priority: Medium    Sleep apnea 01/24/2014     Priority: Medium     Polysomnography 1/20/2014: (214.0 lbs). The lowest oxygen saturation was 88.0%. Apnea/Hypopnea Index was 35.4 events per hour.  The REM AHI was N/A.  The RERA index was 19.7 per hour.   The RDI was 55.1. On CPAP optimal pressure was 9 with an AHI of 0.5 including lateral REM sleep. During the diagnostic portion of the study, PLM index was 99.0 per hour.  During the treatment portion of the study, PLM index was 46.6 per hour.      Neuropathy 07/24/2013     Priority: Medium     Diagnosed based on symptoms in July 24, 2013      Anemia of chronic disease 05/17/2013     Priority: Medium    Hyperlipidemia LDL goal <100 07/23/2011     Priority: Medium    GERD (gastroesophageal reflux disease) 04/12/2011     Priority: Medium     On pantoprazole. Attempting transition to H2B with hx of osteoporosis.      Pulmonary nodule 01/04/2011     Priority: Medium     Ct needed in 10/11      Multinodular goiter      Priority: Medium     IMPRESSION:  1.  Multinodular goiter, similar to previous.  2.  2.7 cm TI-RADS category 3 nodule on the left, not appreciably  changed from previous studies dating back to at least 2018 for biopsy.    Was referred to ENT in  2018:  ENT A/P - Multiple thyroid nodules. One has enlarged in 8 years, but only about 1/2 cm. It was never biopsied and is over 2 cm. I have explained the importance of a ultrasound-guided FNA of the nodule to rule-out malignancy. However, given it hasn't changed much in 8 years it is likely benign. She wanted to repeat U/S in 6 months instead of biopsy. If it grows, she has pain, troubles swallowing, hoarseness, etc, she should return.      History of colonic polyps      Priority: Medium     tubular adenoma        Rheumatoid arthritis involving right hand with positive rheumatoid factor (H) 06/01/2009     Priority: Medium     On methotrexate and Rinvoq        Osteoporosis 02/27/2008     Priority: Medium     Following with Endocrinology. Complicated with fall in 2023 w/ subsequent Right elbow and left distal radial, ulnar fracture s/p ORIF Left distal radius fracture, ORIF Right olecranon fracture.     Hx of reclast once in 2013, 2014, 2016. 12/12/2018 DEXA ordered by Dr. Vázquez showed osteoporosis.  Repeat DEXA on 2/2/2022 showed osteoporosis; no significant change of the hips; forearm osteoporosis but no previous evaluation of the forearm for comparison.  Reclast was restarted after sufficient drug holiday, with the first dose on 3/18/2021; again received in 3/21/2022 and 3/21/2023.  Plan to recheck DEXA in February 2024.   Chronic illness, stable.      - Reclast once yearly; next due on 3/21/2024  - Continue vitamin D 1000 IU daily  - Continue calcium 1200mg daily from supplement and dietary sources  - Plan to recheck DEXA in February 2024      PVD (POSTERIOR VITREOUS DETACHMENT) OU 01/12/2011     Priority: Low    PXF (PSEUDOEXFOLIATION OF LENS CAPSULE) OD 01/12/2011     Priority: Low    PSEUDOPHAKIA OU 01/07/2011     Priority: Low      Past Medical History:   Diagnosis Date    Acute posthemorrhagic anemia 10/13/2012    Closed fracture of multiple ribs of left side, initial encounter 11/25/2019    Closed fracture  of olecranon process of right ulna with routine healing 2023    Closed fracture of shaft of left ulna, unspecified fracture morphology, initial encounter 2023    COPD (chronic obstructive pulmonary disease) (H)     no longer occurring    Depressive disorder     Ex-smoker 1999    Gastroenteritis 2020    Gastroenteritis with norovirus    Herniated nucleus pulposus, L3-4 2017    Hip joint replacement by other means 07/10/2008    History of blood transfusion     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    History of total knee replacement 2009    Menopause 1989    late 40's    Migraine 2014    resolved    Multinodular goiter     Other chronic pain     joints    Other closed intra-articular fracture of distal end of left radius, initial encounter 2023    Pelvic fracture (H) 2014    Rheumatic mitral stenosis     Rheumatoid arteritis (H)     S/P lumbar fusion 2017    Sleep apnea     uses cpap    Vitamin B12 deficiency      Past Surgical History:   Procedure Laterality Date    ABDOMEN SURGERY      c sections    ARTHROSCOPY KNEE Left     ARTHROSCOPY KNEE RT/LT  2006    left    BACK SURGERY  2013    disc    BIOPSY BREAST      BREAST SURGERY      lumpectomy     BREAST SURGERY      lumpectomy    CATARACT EXTRACTION Bilateral     CATARACT IOL, RT/LT Bilateral 2010 aproximately     SECTION  1966     SECTION  1972    COLONOSCOPY  2009,    COLONOSCOPY      FINGER SURGERY Right 2019    Procedure: DISTAL ULNA RESECTION, RIGHT MIDDLE SILASTIC METACARPALPHALANGEAL EXCHANGE AND RIGHT ELBOW NODULE EXCISION.;  Surgeon: Hilario Redmond MD;  Location: [facility] OR;  Service: Orthopedics    FOOT SURGERY Left     GYN SURGERY  66,72    HAND SURGERY Right     HAND SURGERY Right      ESOPH/GAS REFLUX TEST W NASAL IMPED >1 HR  2012    Procedure:ESOPHAGEAL IMPEDENCE FUNCTION TEST WITH 24 HOUR PH GREATER THAN 1 HOUR;  Surgeon:KAYKAY JULIO; Location:[facility]    IR LUMBAR EPIDURAL STEROID INJECTION      IR TRANSLAMINAR EPIDURAL LUMBAR INJ INCL IMAGING  05/02/2012    LESI L5-S1 at [facility]    LUMBAR FUSION      OPEN REDUCTION INTERNAL FIXATION ELBOW Right 2/12/2023    Procedure: OPEN REDUCTION INTERNAL FIXATION, RIGHT OLECRANON FRACTURE;  Surgeon: Pili Brock MD;  Location: [facility] OR    OPEN REDUCTION INTERNAL FIXATION WRIST Left 2/12/2023    Procedure: OPEN REDUCTION INTERNAL FIXATION, LEFT DISTAL RADIUS FRACTURE;  Surgeon: Pili Brock MD;  Location: [facility] OR    OPTICAL TRACKING SYSTEM FUSION POSTERIOR SPINE LUMBAR N/A 04/03/2017    Procedure: OPTICAL TRACKING SYSTEM FUSION SPINE POSTERIOR LUMBAR ONE LEVEL;  Surgeon: Anthony Michele MD;  Location: [facility] OR    ORTHOPEDIC SURGERY  1991    left foot surgery    ORTHOPEDIC SURGERY  1995    R MCP surgery    ORTHOPEDIC SURGERY  2007    right knee total replacement    TOTAL HIP ARTHROPLASTY Right     TOTAL KNEE ARTHROPLASTY Left     TOTAL KNEE ARTHROPLASTY Right     ZZC ANESTH,TOTAL HIP ARTHROPLASTY  2010    Rt hip    ZZC HAND/FINGER SURGERY UNLISTED  11/2005    right hand    ZZC TOTAL KNEE ARTHROPLASTY  2006    left     Current Outpatient Medications   Medication Sig Dispense Refill    acetaminophen (TYLENOL) 500 MG tablet Take 1-2 tablets (500-1,000 mg) by mouth every 6 hours as needed for mild pain.      albuterol (PROAIR HFA/PROVENTIL HFA/VENTOLIN HFA) 108 (90 Base) MCG/ACT inhaler Inhale 2 puffs into the lungs every 6 hours as needed for shortness of breath, wheezing or cough      atorvastatin (LIPITOR) 40 MG tablet Take 1 tablet (40 mg) by mouth daily 90 tablet 3    buPROPion (WELLBUTRIN XL) 150 MG 24 hr tablet TAKE ONE TABLET BY MOUTH EVERY MORNING 30 tablet 0    CALCIUM CITRATE PO Take 300 mg by mouth daily Take with meal that contains least amount of calcium      carboxymethylcellulose PF (REFRESH PLUS) 0.5 % ophthalmic solution Place 1 drop into both eyes 2  times daily as needed for dry eyes      Cholecalciferol (VITAMIN D-3 PO) Take 1,000 Units by mouth daily.      Ferrous Gluconate 324 (37.5 Fe) MG TABS Take 1 tablet by mouth daily Started 6/2023      ipratropium (ATROVENT) 0.06 % nasal spray Spray 2 sprays into both nostrils 4 times daily as needed for rhinitis (nasal drainage) 15 mL 11    leucovorin (WELLCOVORIN) 5 MG tablet Take 1 tablet (5 mg) by mouth every 7 days . Take 24 hours after taking Methotrexate each week. 13 tablet 2    loratadine (CLARITIN) 10 MG tablet Take 1 tablet (10 mg) by mouth 2 times daily      Methotrexate, PF, (RASUVO) 25 MG/0.5ML autoinjector Inject 0.5 mLs (25 mg) subcutaneously every 7 days. . Hold for signs of infection, and seek medical attention. Take every Thursday. 2 mL 9    olopatadine (PATADAY) 0.2 % ophthalmic solution Place 1 drop into both eyes daily as needed Uses mostly in Spring      pantoprazole (PROTONIX) 40 MG EC tablet TAKE ONE TABLET BY MOUTH EVERY MORNING BEFORE BREAKFAST 90 tablet 0    pregabalin (LYRICA) 50 MG capsule Take 1 capsule (50 mg) by mouth 3 times daily 270 capsule 1    sertraline (ZOLOFT) 100 MG tablet TAKE ONE TABLET BY MOUTH ONCE DAILY 90 tablet 1    UNABLE TO FIND MEDICATION NAME: Distilled water for CPAP      upadacitinib ER (RINVOQ) 15 MG tablet Take 1 tablet (15 mg) by mouth daily. . Hold for any infection and seek medical attention. 30 tablet 9    valACYclovir (VALTREX) 500 MG tablet TAKE ONE TABLET BY MOUTH TWICE A  tablet 0    vitamin C (ASCORBIC ACID) 1000 MG TABS Take 1 tablet (1,000 mg) by mouth daily      zoledronic Acid (RECLAST) 5 MG/100ML SOLN infusion Inject 5 mg into the vein once Every year (next dose March 2023)         Allergies   Allergen Reactions    Abatacept Other (See Comments)     Severe headaches  Migraine    Celebrex [Roche-2 Inhibitors (Sulfonamide)]      Ineffective    Celecoxib Unknown and Nausea    Levaquin [Levofloxacin] Fatigue     Severe body pain    Orencia  "[Abatacept]      Headache    Septra [Bactrim] Unknown    Sulfa Antibiotics Nausea and Vomiting     \"deathly ill\"  Allergic to everything with Sulfa in it.    Sulfamethoxazole-Trimethoprim Nausea and Nausea and Vomiting    Tramadol Other (See Comments)     Headache  Migraine headache    Valdecoxib Unknown and Nausea     Made me \"very very ill\", might of been \"cramping\"    Adhesive Tape Rash     Plastic tape  Plastic tapes    Antihistamines, Chlorpheniramine-Type  [Antihistamines, Chlorpheniramine-Type] Anxiety and Other (See Comments)        Social History     Tobacco Use    Smoking status: Former     Current packs/day: 0.00     Average packs/day: 1 pack/day for 41.0 years (41.0 ttl pk-yrs)     Types: Cigarettes     Start date: 1958     Quit date: 1999     Years since quittin.9    Smokeless tobacco: Never    Tobacco comments:     former smoker   Substance Use Topics    Alcohol use: Yes     Comment: Occasionally,  usually wine     {FAMILY HISTORY (Optional):508292101}  History   Drug Use No           Review of Systems  CONSTITUTIONAL: NEGATIVE for fever, chills, change in weight  INTEGUMENTARY/SKIN: NEGATIVE for worrisome rashes, moles or lesions  EYES: NEGATIVE for vision changes or irritation  ENT/MOUTH: NEGATIVE for ear, mouth and throat problems  RESP: NEGATIVE for significant increased cough or SOB  CV: NEGATIVE for chest pain, palpitations or peripheral edema  GI: NEGATIVE for nausea, abdominal pain, heartburn, or change in bowel habits  : NEGATIVE for frequency, dysuria, or hematuria  NEURO: NEGATIVE for weakness, dizziness or paresthesias  ENDOCRINE: NEGATIVE for temperature intolerance, skin/hair changes  HEME: NEGATIVE for bleeding problems  PSYCHIATRIC: NEGATIVE for changes in mood or affect    Objective    /65 (BP Location: Right arm, Patient Position: Sitting, Cuff Size: Adult Large)   Pulse 85   Temp 97.9  F (36.6  C) (Oral)   Resp 16   Ht 1.664 m (5' 5.5\")   Wt 94.5 kg (208 lb " "6.4 oz)   SpO2 96%   BMI 34.15 kg/m     Estimated body mass index is 34.15 kg/m  as calculated from the following:    Height as of this encounter: 1.664 m (5' 5.5\").    Weight as of this encounter: 94.5 kg (208 lb 6.4 oz).  Physical Exam  GENERAL: alert and no distress  EYES: Eyes grossly normal to inspection, PERRL and conjunctivae and sclerae normal  HENT: ear canals and TM's normal, nose and mouth without ulcers or lesions  NECK: no adenopathy, no asymmetry, masses, or scars  RESP: lungs clear to auscultation - no rales, rhonchi or wheezes  CV: regular rate and rhythm, normal S1 S2, no S3 or S4, no murmur, click or rub, no peripheral edema  ABDOMEN: soft, nontender, no hepatosplenomegaly, no masses and bowel sounds normal  MS: no gross musculoskeletal defects noted, no edema  SKIN: no suspicious lesions or rashes  NEURO: Normal strength and tone, mentation intact and speech normal  PSYCH: mentation appears normal, affect normal/bright    Recent Labs   Lab Test 09/27/24  1006 09/04/24  1011 06/25/24  0932 05/14/24  1001 02/06/24  1036 12/05/23  1413   HGB 11.6* 11.6* 11.8 12.6   < > 11.9   PLT  --  280 317 387   < > 379   CR  --  0.67  --  0.55   < > 0.55   A1C  --   --   --   --   --  5.4    < > = values in this interval not displayed.        Diagnostics  Labs pending at this time.  Results will be reviewed when available.   No EKG this visit, completed in the last 90 days.    Revised Cardiac Risk Index (RCRI)  The patient has the following serious cardiovascular risks for perioperative complications:   - No serious cardiac risks = 0 points     RCRI Interpretation: 0 points: Class I (very low risk - 0.4% complication rate)         Signed Electronically by: AKIRA Sorensen CNP  A copy of this evaluation report is provided to the requesting physician.    {Provider Resources  Preop [facility] Preop Guidelines  Revised Cardiac Risk Index :827223}   {Email feedback regarding this note to " [email or web address]   :[id number]}

## 2024-11-20 NOTE — PATIENT INSTRUCTIONS
Consider the RSV shot at your pharmacy    How to Take Your Medication Before Surgery  Preoperative Medication Instructions   Antiplatelet or Anticoagulation Medication Instructions   - Patient is on no antiplatelet or anticoagulation medications.    Additional Medication Instructions  Take all scheduled medications on the day of surgery EXCEPT for modifications listed below:   - Statins: Continue taking on the day of surgery.    - DMARDs Continue methotrexate as ordered and hold Rinvoq (Upadacitinib) for three days prior to surgery    - pregabalin, gabapentin: Continue without modification.   - SSRIs, SNRIs, TCAs, Antipsychotics: Continue without modification.    - rescue Inhaler: Continue PRN. Bring to hospital on the day of surgery.       Patient Education   Preparing for Your Surgery  For Adults  Getting started  In most cases, a nurse will call to review your health history and instructions. They will give you an arrival time based on your scheduled surgery time. Please be ready to share:  Your doctor's clinic name and phone number  Your medical, surgical, and anesthesia history  A list of allergies and sensitivities  A list of medicines, including herbal treatments and over-the-counter drugs  Whether the patient has a legal guardian (ask how to send us the papers in advance)  Note: You may not receive a call if you were seen at our PAC (Preoperative Assessment Center).  Please tell us if you're pregnant--or if there's any chance you might be pregnant. Some surgeries may injure a fetus (unborn baby), so they require a pregnancy test. Surgeries that are safe for a fetus don't always need a test, and you can choose whether to have one.   Preparing for surgery  Within 10 to 30 days of surgery: Have a pre-op exam (sometimes called an H&P, or History and Physical). This can be done at a clinic or pre-operative center.  If you're having a , you may not need this exam. Talk to your care team.  At your pre-op  exam, talk to your care team about all medicines you take. (This includes CBD oil and any drugs, such as THC, marijuana, and other forms of cannabis.) If you need to stop any medicine before surgery, ask when to start taking it again.  This is for your safety. Many medicines and drugs can make you bleed too much during surgery. Some change how well surgery (anesthesia) drugs work.  Call your insurance company to let them know you're having surgery. (If you don't have insurance, call [phone].)  Call your clinic if there's any change in your health. This includes a scrape or scratch near the surgery site, or any signs of a cold (sore throat, runny nose, cough, rash, fever).  Eating and drinking guidelines  For your safety: Unless your surgeon tells you otherwise, follow the guidelines below.  Eat and drink as normal until 8 hours before you arrive for surgery. After that, no food or milk. You can spit out gum when you arrive.  Drink clear liquids until 2 hours before you arrive. These are liquids you can see through, like water, Gatorade, and Propel Water. They also include plain black coffee and tea (no cream or milk).  No alcohol for 24 hours before you arrive. The night before surgery, stop any drinks that contain THC.  If your care team tells you to take medicine on the morning of surgery, it's okay to take it with a sip of water. No other medicines or drugs are allowed (including CBD oil)--follow your care team's instructions.  If you have questions the day of surgery, call your hospital or surgery center.   Preventing infection  Shower or bathe the night before and the morning of surgery. Follow the instructions your clinic gave you. (If no instructions, use regular soap.)  Don't shave or clip hair near your surgery site. We'll remove the hair if needed.  Don't smoke or vape the morning of surgery. No chewing tobacco for 6 hours before you arrive. A nicotine patch is okay. You may spit out nicotine gum when  you arrive.  For some surgeries, the surgeon will tell you to fully quit smoking and nicotine.  We will make every effort to keep you safe from infection. We will:  Clean our hands often with soap and water (or an alcohol-based hand rub).  Clean the skin at your surgery site with a special soap that kills germs.  Give you a special gown to keep you warm. (Cold raises the risk of infection.)  Wear hair covers, masks, gowns, and gloves during surgery.  Give antibiotic medicine, if prescribed. Not all surgeries need this medicine.  What to bring on the day of surgery  Photo ID and insurance card  Copy of your health care directive, if you have one  Glasses and hearing aids (bring cases)  You can't wear contacts during surgery  Inhaler and eye drops, if you use them (tell us about these when you arrive)  CPAP machine or breathing device, if you use them  A few personal items, if spending the night  If you have . . .  A pacemaker, ICD (cardiac defibrillator), or other implant: Bring the ID card.  An implanted stimulator: Bring the remote control.  A legal guardian: Bring a copy of the certified (court-stamped) guardianship papers.  Please remove any jewelry, including body piercings. Leave jewelry and other valuables at home.  If you're going home the day of surgery  You must have a responsible adult drive you home. They should stay with you overnight as well.  If you don't have someone to stay with you, and you aren't safe to go home alone, we may keep you overnight. Insurance often won't pay for this.  After surgery  If it's hard to control your pain or you need more pain medicine, please call your surgeon's office.  Questions?   If you have any questions for your care team, list them here:    ____________________________________________________________________________________________________________________________________________________________________________________________________________________________________________________________  For informational purposes only. Not to replace the advice of your health care provider. Copyright   2019 [facility]. All rights reserved. Clinically reviewed by Davin Hardin MD. [employer] [id number] - REV .     Patient Education   Preparing for Your Surgery  For Adults  Getting started  In most cases, a nurse will call to review your health history and instructions. They will give you an arrival time based on your scheduled surgery time. Please be ready to share:  Your doctor's clinic name and phone number  Your medical, surgical, and anesthesia history  A list of allergies and sensitivities  A list of medicines, including herbal treatments and over-the-counter drugs  Whether the patient has a legal guardian (ask how to send us the papers in advance)  Note: You may not receive a call if you were seen at our PAC (Preoperative Assessment Center).  Please tell us if you're pregnant--or if there's any chance you might be pregnant. Some surgeries may injure a fetus (unborn baby), so they require a pregnancy test. Surgeries that are safe for a fetus don't always need a test, and you can choose whether to have one.   Preparing for surgery  Within 10 to 30 days of surgery: Have a pre-op exam (sometimes called an H&P, or History and Physical). This can be done at a clinic or pre-operative center.  If you're having a , you may not need this exam. Talk to your care team.  At your pre-op exam, talk to your care team about all medicines you take. (This includes CBD oil and any drugs, such as THC, marijuana, and other forms of cannabis.) If you need to stop any medicine before surgery, ask when to start taking it again.  This is for your  safety. Many medicines and drugs can make you bleed too much during surgery. Some change how well surgery (anesthesia) drugs work.  Call your insurance company to let them know you're having surgery. (If you don't have insurance, call [phone].)  Call your clinic if there's any change in your health. This includes a scrape or scratch near the surgery site, or any signs of a cold (sore throat, runny nose, cough, rash, fever).  Eating and drinking guidelines  For your safety: Unless your surgeon tells you otherwise, follow the guidelines below.  Eat and drink as normal until 8 hours before you arrive for surgery. After that, no food or milk. You can spit out gum when you arrive.  Drink clear liquids until 2 hours before you arrive. These are liquids you can see through, like water, Gatorade, and Propel Water. They also include plain black coffee and tea (no cream or milk).  No alcohol for 24 hours before you arrive. The night before surgery, stop any drinks that contain THC.  If your care team tells you to take medicine on the morning of surgery, it's okay to take it with a sip of water. No other medicines or drugs are allowed (including CBD oil)--follow your care team's instructions.  If you have questions the day of surgery, call your hospital or surgery center.   Preventing infection  Shower or bathe the night before and the morning of surgery. Follow the instructions your clinic gave you. (If no instructions, use regular soap.)  Don't shave or clip hair near your surgery site. We'll remove the hair if needed.  Don't smoke or vape the morning of surgery. No chewing tobacco for 6 hours before you arrive. A nicotine patch is okay. You may spit out nicotine gum when you arrive.  For some surgeries, the surgeon will tell you to fully quit smoking and nicotine.  We will make every effort to keep you safe from infection. We will:  Clean our hands often with soap and water (or an alcohol-based hand rub).  Clean the  skin at your surgery site with a special soap that kills germs.  Give you a special gown to keep you warm. (Cold raises the risk of infection.)  Wear hair covers, masks, gowns, and gloves during surgery.  Give antibiotic medicine, if prescribed. Not all surgeries need this medicine.  What to bring on the day of surgery  Photo ID and insurance card  Copy of your health care directive, if you have one  Glasses and hearing aids (bring cases)  You can't wear contacts during surgery  Inhaler and eye drops, if you use them (tell us about these when you arrive)  CPAP machine or breathing device, if you use them  A few personal items, if spending the night  If you have . . .  A pacemaker, ICD (cardiac defibrillator), or other implant: Bring the ID card.  An implanted stimulator: Bring the remote control.  A legal guardian: Bring a copy of the certified (court-stamped) guardianship papers.  Please remove any jewelry, including body piercings. Leave jewelry and other valuables at home.  If you're going home the day of surgery  You must have a responsible adult drive you home. They should stay with you overnight as well.  If you don't have someone to stay with you, and you aren't safe to go home alone, we may keep you overnight. Insurance often won't pay for this.  After surgery  If it's hard to control your pain or you need more pain medicine, please call your surgeon's office.  Questions?   If you have any questions for your care team, list them here:   ____________________________________________________________________________________________________________________________________________________________________________________________________________________________________________________________  For informational purposes only. Not to replace the advice of your health care provider. Copyright   2003, 2019 [facility]. All rights reserved. Clinically reviewed by Davin Hardin MD. [employer] [id number] -  REV 08/24.

## 2024-11-21 LAB
ALBUMIN SERPL BCG-MCNC: 4.3 G/DL (ref 3.5–5.2)
ALP SERPL-CCNC: 39 U/L (ref 40–150)
ALT SERPL W P-5'-P-CCNC: 23 U/L (ref 0–50)
AST SERPL W P-5'-P-CCNC: 30 U/L (ref 0–45)
BILIRUB DIRECT SERPL-MCNC: <0.2 MG/DL (ref 0–0.3)
BILIRUB SERPL-MCNC: 0.4 MG/DL
CREAT SERPL-MCNC: 0.66 MG/DL (ref 0.51–0.95)
CRP SERPL-MCNC: <3 MG/L
EGFRCR SERPLBLD CKD-EPI 2021: 87 ML/MIN/1.73M2
PROT SERPL-MCNC: 7.5 G/DL (ref 6.4–8.3)

## 2024-11-22 NOTE — PROVIDER NOTIFICATION
10/08/24 0727   Discharge Planning   Patient/Family Anticipates Transition to (Patient-Rptd)  family members' home   Concerns to be Addressed denies needs/concerns at this time   Living Arrangements   People in Home alone   Type of Residence Private Residence   Is your private residence a single family home or apartment? Single family home   Number of Stairs, Within Home, Primary (Patient-Rptd)  none   Stair Railings, Within Home, Primary none   Once home, are you able to live on one level? (Patient-Rptd)  Yes   Which level? Main Level   Bathroom Shower/Tub Walk-in shower   Equipment Currently Used at Home (Patient-Rptd)  cane, straight;dressing device;grab bar, toilet;grab bar, tub/shower;raised toilet seat;shower chair;walker, rolling;walker, standard   Support System   Support Systems Family Members;[employer];Friends/Neighbors;Children   Do you have someone available to stay with you one or two nights once you are home? (Patient-Rptd)  Yes   Blood   Known Bleeding Disorder or Coagulopathy No   Does the patient have any Nondenominational/cultural preferences related to blood products? No   Education   Has the patient scheduled or completed pre-op total joint education, either in class or online, in the last 12 months? Yes   Patient attended total joint pre-op class/received pre-op teaching  online

## 2024-12-02 ENCOUNTER — THERAPY VISIT (OUTPATIENT)
Dept: PHYSICAL THERAPY | Facility: CLINIC | Age: 84
End: 2024-12-02
Payer: COMMERCIAL

## 2024-12-02 DIAGNOSIS — M75.82 TENDINITIS OF LEFT ROTATOR CUFF: ICD-10-CM

## 2024-12-02 DIAGNOSIS — Z98.890 S/P LEFT ROTATOR CUFF REPAIR: Primary | ICD-10-CM

## 2024-12-02 DIAGNOSIS — M19.012 OSTEOARTHRITIS OF LEFT GLENOHUMERAL JOINT: ICD-10-CM

## 2024-12-02 PROCEDURE — 97110 THERAPEUTIC EXERCISES: CPT | Mod: GP | Performed by: PHYSICAL THERAPIST

## 2024-12-03 ENCOUNTER — MYC MEDICAL ADVICE (OUTPATIENT)
Dept: FAMILY MEDICINE | Facility: CLINIC | Age: 84
End: 2024-12-03

## 2024-12-03 ENCOUNTER — VIRTUAL VISIT (OUTPATIENT)
Dept: FAMILY MEDICINE | Facility: CLINIC | Age: 84
End: 2024-12-03
Payer: COMMERCIAL

## 2024-12-03 DIAGNOSIS — G89.29 OTHER CHRONIC PAIN: ICD-10-CM

## 2024-12-03 DIAGNOSIS — M25.552 HIP PAIN, LEFT: Primary | ICD-10-CM

## 2024-12-03 PROCEDURE — 99213 OFFICE O/P EST LOW 20 MIN: CPT | Mod: 95 | Performed by: PHYSICIAN ASSISTANT

## 2024-12-03 RX ORDER — HYDROCODONE BITARTRATE AND ACETAMINOPHEN 5; 325 MG/1; MG/1
1 TABLET ORAL EVERY 8 HOURS PRN
Qty: 30 TABLET | Refills: 0 | Status: SHIPPED | OUTPATIENT
Start: 2024-12-03 | End: 2024-12-13

## 2024-12-03 ASSESSMENT — ENCOUNTER SYMPTOMS: HIP PAIN: 1

## 2024-12-03 NOTE — PROGRESS NOTES
"Ginger is a 84 year old who is being evaluated via a billable video visit.    How would you like to obtain your AVS? MyChart  If the video visit is dropped, the invitation should be resent by: Text to cell phone: [phone]  Will anyone else be joining your video visit? No      Assessment & Plan     Hip pain, left  Other chronic pain  Discussed mechanism of action of medication, proper dosing, potential side effects and appropriate follow up.  Discussed she will need to make this amount last until surgery (2-3 per day max) and that she will then be treated by her surgeons for post op period.  - HYDROcodone-acetaminophen (NORCO) 5-325 MG tablet; Take 1 tablet by mouth every 8 hours as needed for severe pain.                BMI  Estimated body mass index is 34.15 kg/m  as calculated from the following:    Height as of 11/20/24: 1.664 m (5' 5.5\").    Weight as of 11/20/24: 94.5 kg (208 lb 6.4 oz).   Weight management plan: Discussed healthy diet and exercise guidelines          Subjective   Ginger is a 84 year old, presenting for the following health issues:  Hip Pain        12/3/2024     1:12 PM   Additional Questions   Roomed by Edie TALBERT MA   Accompanied by self         12/3/2024     1:12 PM   Patient Reported Additional Medications   Patient reports taking the following new medications none     Video Start Time:  144pm    Hip Pain    History of Present Illness       Reason for visit:  Severe left hip pain.   (Waiting surgery on 12/7/24   She is taking medications regularly.     Left hip pain, pt would like to get pain medication for her hip     Patient is having surgery on Dec 13th for hip replacement.  Stating today that pain is intolerable and she is suffering.  Cannot take NSAIDS.  Taking tylenol only.      She is on lyrica for chronic pain and also has RA and takes methotrexate and Rasuvo.    She has been seen in the past by our pain management clinic.  Was treated with short term Rx for Norco but was educated on the " use of narcotics for acute arthritis pain.              Review of Systems  Constitutional, HEENT, cardiovascular, pulmonary, gi and gu systems are negative, except as otherwise noted.      Objective           Vitals:  No vitals were obtained today due to virtual visit.    Physical Exam   GENERAL: alert and no distress  EYES: Eyes grossly normal to inspection.  No discharge or erythema, or obvious scleral/conjunctival abnormalities.  RESP: No audible wheeze, cough, or visible cyanosis.    SKIN: Visible skin clear. No significant rash, abnormal pigmentation or lesions.  NEURO: Cranial nerves grossly intact.  Mentation and speech appropriate for age.  PSYCH: Appropriate affect, tone, and pace of words          Video-Visit Details    Type of service:  Video Visit   Video End Time:1:54 PM  Originating Location (pt. Location): Home    Distant Location (provider location):  On-site  Platform used for Video Visit: Gordo  Signed Electronically by: Matteo Joe PA-C

## 2024-12-03 NOTE — TELEPHONE ENCOUNTER
Dr. Leyva is out of the office until 12/5/24.    Called the pt.      She is rating her pain about an 8/10 most of the time.  Sometimes it goes higher.  The pain is in her left hip.  She is having that replaced next week on Friday.      Advised she will need some kind of visit for this.  Appt was scheduled this afternoon.  Advised I can't guarantee what she would give you, but at least you can talk to a provider soon.    The pt said it is hard for her to do anything.  She is having a hard time sleeping.      She is requesting the appt to be video because it is hard for her to get here and close to the appt time.        Appointments in Next Year      Dec 03, 2024 1:30 PM  (Arrive by 1:25 PM)  Provider Visit with Matteo Joe PA-C  [facility] ([facility] ) [phone]     Dec 09, 2024 8:10 AM  Extremity Treatment with Alexandr Dempsey PT  [facility] ([facility] ) [phone]     Dec 23, 2024 10:20 AM  (Arrive by 10:05 AM)  Extremity Evaluation with Marie Salazar PT  [facility] ([facility] ) [phone]     Dec 27, 2024 11:40 AM  (Arrive by 11:25 AM)  Post-Op Hip Replacement with Eugenio Jeffries MD  [facility] ([facility] ) [phone]     Dec 30, 2024 10:10 AM  Extremity Treatment with Marie Salazar PT  [facility] ([facility] ) [phone]     Jan 06, 2025 10:10 AM  Extremity Treatment with Alexandr Dempsey PT  [facility] ([facility] ) [phone]     Jan 13, 2025 10:10 AM  Extremity Treatment  with Marie Salazar, PT  [facility] [place] ([facility] ) [phone]     Jan 20, 2025 10:10 AM  Extremity Treatment with Marie Salazar, PT  [facility] [place] ([facility] ) [phone]     Jan 24, 2025 3:30 PM  (Arrive by 3:15 PM)  Post-Op Hip Replacement with Cody Capellan PA-C  [facility] ([facility] ) [phone]     Mar 07, 2025 10:30 AM  (Arrive by 10:10 AM)  MEDICARE ANNUAL WELLNESS VISIT with Duy Leyva MD  [facility] ([facility] ) [phone]     Mar 20, 2025 1:15 PM  (Arrive by 1:00 PM)  NEW THROAT with Kathe Vázquez MD  [facility] ([facility] [place] ) [phone]     Mar 20, 2025 1:15 PM  (Arrive by 1:00 PM)  P Speech Pathology and ENT Evaluation with  Ent Dysphonia Slp Provider  [facility] [place] ([facility] ) [phone]

## 2024-12-09 ENCOUNTER — THERAPY VISIT (OUTPATIENT)
Dept: PHYSICAL THERAPY | Facility: CLINIC | Age: 84
End: 2024-12-09
Payer: COMMERCIAL

## 2024-12-09 DIAGNOSIS — Z98.890 S/P LEFT ROTATOR CUFF REPAIR: Primary | ICD-10-CM

## 2024-12-09 DIAGNOSIS — M75.82 TENDINITIS OF LEFT ROTATOR CUFF: ICD-10-CM

## 2024-12-09 DIAGNOSIS — M19.012 OSTEOARTHRITIS OF LEFT GLENOHUMERAL JOINT: ICD-10-CM

## 2024-12-09 PROCEDURE — 97110 THERAPEUTIC EXERCISES: CPT | Mod: GP | Performed by: PHYSICAL THERAPIST

## 2024-12-12 NOTE — PHARMACY-ADMISSION MEDICATION HISTORY
Medication reconciliation interview completed by pre-admitting nurse, reviewed by pharmacy.   No further clarifications needed.     Prior to Admission medications    Medication Sig Last Dose Taking? Auth Provider Long Term End Date   albuterol (PROAIR HFA/PROVENTIL HFA/VENTOLIN HFA) 108 (90 Base) MCG/ACT inhaler Inhale 2 puffs into the lungs every 6 hours as needed for shortness of breath, wheezing or cough  Yes Carolyn Harrington PA-C Yes    atorvastatin (LIPITOR) 40 MG tablet Take 1 tablet (40 mg) by mouth daily  Yes Duy Leyva MD Yes    CALCIUM CITRATE PO Take 300 mg by mouth daily Take with meal that contains least amount of calcium  Yes Reported, Patient     carboxymethylcellulose PF (REFRESH PLUS) 0.5 % ophthalmic solution Place 1 drop into both eyes 2 times daily as needed for dry eyes  Yes Carolyn Harrington PA-C     Cholecalciferol (VITAMIN D-3 PO) Take 1,000 Units by mouth daily.  Yes Reported, Patient     Ferrous Gluconate 324 (37.5 Fe) MG TABS Take 1 tablet by mouth daily Started 6/2023  Yes Reported, Patient     ipratropium (ATROVENT) 0.06 % nasal spray Spray 2 sprays into both nostrils 4 times daily as needed for rhinitis (nasal drainage)  Yes Ry Espinosa MD     leucovorin (WELLCOVORIN) 5 MG tablet Take 1 tablet (5 mg) by mouth every 7 days . Take 24 hours after taking Methotrexate each week.  Yes Nico Byers MD Yes    loratadine (CLARITIN) 10 MG tablet Take 1 tablet (10 mg) by mouth 2 times daily  Yes Carolyn Harrington PA-C     Methotrexate, PF, (RASUVO) 25 MG/0.5ML autoinjector Inject 0.5 mLs (25 mg) subcutaneously every 7 days. . Hold for signs of infection, and seek medical attention. Take every Thursday.  Yes Nico Byers MD Yes    olopatadine (PATADAY) 0.2 % ophthalmic solution Place 1 drop into both eyes daily as needed Uses mostly in Spring  Yes Reported, Patient     pregabalin (LYRICA) 50 MG capsule Take 1 capsule (50 mg) by mouth 3 times daily  Yes Kayla Mathur, CHUCHO  Yes    upadacitinib ER (RINVOQ) 15 MG tablet Take 1 tablet (15 mg) by mouth daily. . Hold for any infection and seek medical attention.  Yes Nico Byers MD     vitamin C (ASCORBIC ACID) 1000 MG TABS Take 1 tablet (1,000 mg) by mouth daily  Yes Carolyn Harrington PA-C     zoledronic Acid (RECLAST) 5 MG/100ML SOLN infusion Inject 5 mg into the vein once Every year (next dose March 2023) 4/1/2024 Yes Reported, Patient     acetaminophen (TYLENOL) 500 MG tablet Take 1-2 tablets (500-1,000 mg) by mouth every 6 hours as needed for mild pain.   Kodi Quesada PA-C No    buPROPion (WELLBUTRIN XL) 150 MG 24 hr tablet TAKE ONE TABLET BY MOUTH EVERY MORNING   Roe Hernandez PA-C Yes    HYDROcodone-acetaminophen (NORCO) 5-325 MG tablet Take 1 tablet by mouth every 8 hours as needed for severe pain.   Matteo Joe PA-C No 12/13/24   pantoprazole (PROTONIX) 40 MG EC tablet TAKE ONE TABLET BY MOUTH EVERY MORNING BEFORE BREAKFAST   Duy Leyva MD     sertraline (ZOLOFT) 100 MG tablet TAKE ONE TABLET BY MOUTH ONCE DAILY   Duy Leyva MD Yes    UNABLE TO FIND MEDICATION NAME: Distilled water for CPAP   Reported, Patient     valACYclovir (VALTREX) 500 MG tablet TAKE ONE TABLET BY MOUTH TWICE A DAY   Duy Leyva MD Yes

## 2024-12-13 ENCOUNTER — HOSPITAL ENCOUNTER (OUTPATIENT)
Facility: CLINIC | Age: 84
Discharge: HOME | End: 2024-12-14
Attending: STUDENT IN AN ORGANIZED HEALTH CARE EDUCATION/TRAINING PROGRAM | Admitting: STUDENT IN AN ORGANIZED HEALTH CARE EDUCATION/TRAINING PROGRAM
Payer: COMMERCIAL

## 2024-12-13 ENCOUNTER — APPOINTMENT (OUTPATIENT)
Dept: GENERAL RADIOLOGY | Facility: CLINIC | Age: 84
End: 2024-12-13
Attending: STUDENT IN AN ORGANIZED HEALTH CARE EDUCATION/TRAINING PROGRAM
Payer: COMMERCIAL

## 2024-12-13 DIAGNOSIS — M05.79 RHEUMATOID ARTHRITIS INVOLVING MULTIPLE SITES WITH POSITIVE RHEUMATOID FACTOR (H): ICD-10-CM

## 2024-12-13 DIAGNOSIS — M16.12 OSTEOARTHRITIS OF ONE HIP, LEFT: Primary | ICD-10-CM

## 2024-12-13 DIAGNOSIS — Z96.642 STATUS POST TOTAL REPLACEMENT OF LEFT HIP: ICD-10-CM

## 2024-12-13 PROBLEM — Z96.649 S/P TOTAL HIP ARTHROPLASTY: Status: ACTIVE | Noted: 2024-12-13

## 2024-12-13 PROCEDURE — 250N000011 HC RX IP 250 OP 636: Performed by: ANESTHESIOLOGY

## 2024-12-13 PROCEDURE — C1776 JOINT DEVICE (IMPLANTABLE): HCPCS | Performed by: STUDENT IN AN ORGANIZED HEALTH CARE EDUCATION/TRAINING PROGRAM

## 2024-12-13 PROCEDURE — 250N000011 HC RX IP 250 OP 636: Performed by: STUDENT IN AN ORGANIZED HEALTH CARE EDUCATION/TRAINING PROGRAM

## 2024-12-13 PROCEDURE — 250N000025 HC SEVOFLURANE, PER MIN: Performed by: STUDENT IN AN ORGANIZED HEALTH CARE EDUCATION/TRAINING PROGRAM

## 2024-12-13 PROCEDURE — 258N000001 HC RX 258: Performed by: STUDENT IN AN ORGANIZED HEALTH CARE EDUCATION/TRAINING PROGRAM

## 2024-12-13 PROCEDURE — 710N000009 HC RECOVERY PHASE 1, LEVEL 1, PER MIN: Performed by: STUDENT IN AN ORGANIZED HEALTH CARE EDUCATION/TRAINING PROGRAM

## 2024-12-13 PROCEDURE — 27130 TOTAL HIP ARTHROPLASTY: CPT | Mod: LT | Performed by: STUDENT IN AN ORGANIZED HEALTH CARE EDUCATION/TRAINING PROGRAM

## 2024-12-13 PROCEDURE — 360N000077 HC SURGERY LEVEL 4, PER MIN: Performed by: STUDENT IN AN ORGANIZED HEALTH CARE EDUCATION/TRAINING PROGRAM

## 2024-12-13 PROCEDURE — 272N000001 HC OR GENERAL SUPPLY STERILE: Performed by: STUDENT IN AN ORGANIZED HEALTH CARE EDUCATION/TRAINING PROGRAM

## 2024-12-13 PROCEDURE — C1713 ANCHOR/SCREW BN/BN,TIS/BN: HCPCS | Performed by: STUDENT IN AN ORGANIZED HEALTH CARE EDUCATION/TRAINING PROGRAM

## 2024-12-13 PROCEDURE — 999N000141 HC STATISTIC PRE-PROCEDURE NURSING ASSESSMENT: Performed by: STUDENT IN AN ORGANIZED HEALTH CARE EDUCATION/TRAINING PROGRAM

## 2024-12-13 PROCEDURE — 370N000017 HC ANESTHESIA TECHNICAL FEE, PER MIN: Performed by: STUDENT IN AN ORGANIZED HEALTH CARE EDUCATION/TRAINING PROGRAM

## 2024-12-13 PROCEDURE — 258N000003 HC RX IP 258 OP 636: Performed by: ANESTHESIOLOGY

## 2024-12-13 PROCEDURE — 250N000013 HC RX MED GY IP 250 OP 250 PS 637: Performed by: INTERNAL MEDICINE

## 2024-12-13 PROCEDURE — 999N000065 XR PELVIS AND HIP PORTABLE LEFT 1 VIEW

## 2024-12-13 PROCEDURE — 250N000009 HC RX 250: Performed by: STUDENT IN AN ORGANIZED HEALTH CARE EDUCATION/TRAINING PROGRAM

## 2024-12-13 PROCEDURE — 258N000003 HC RX IP 258 OP 636: Performed by: STUDENT IN AN ORGANIZED HEALTH CARE EDUCATION/TRAINING PROGRAM

## 2024-12-13 PROCEDURE — 250N000013 HC RX MED GY IP 250 OP 250 PS 637: Performed by: STUDENT IN AN ORGANIZED HEALTH CARE EDUCATION/TRAINING PROGRAM

## 2024-12-13 DEVICE — BUCK FEMORAL CEMENT RESTRICTOR 25MM
Type: IMPLANTABLE DEVICE | Site: HIP | Status: FUNCTIONAL
Brand: BUCK

## 2024-12-13 DEVICE — V40 STEM
Type: IMPLANTABLE DEVICE | Site: HIP | Status: FUNCTIONAL
Brand: EXETER

## 2024-12-13 DEVICE — TRIDENT II TRITANIUM CLUSTER 52E
Type: IMPLANTABLE DEVICE | Site: HIP | Status: FUNCTIONAL
Brand: TRIDENT II

## 2024-12-13 DEVICE — TRIDENT X3 0 DEGREE POLYETHYLENE INSERT
Type: IMPLANTABLE DEVICE | Site: HIP | Status: FUNCTIONAL
Brand: TRIDENT X3 INSERT

## 2024-12-13 DEVICE — FULL DOSE BONE CEMENT, 10 PACK CATALOG NUMBER IS 6191-1-010
Type: IMPLANTABLE DEVICE | Site: HIP | Status: FUNCTIONAL
Brand: SIMPLEX

## 2024-12-13 DEVICE — CERAMIC V40 FEMORAL HEAD
Type: IMPLANTABLE DEVICE | Site: HIP | Status: FUNCTIONAL
Brand: BIOLOX

## 2024-12-13 RX ORDER — CEFAZOLIN SODIUM 2 G/100ML
2 INJECTION, SOLUTION INTRAVENOUS EVERY 8 HOURS
Status: COMPLETED | OUTPATIENT
Start: 2024-12-13 | End: 2024-12-14

## 2024-12-13 RX ORDER — CEFAZOLIN SODIUM/WATER 2 G/20 ML
2 SYRINGE (ML) INTRAVENOUS
Status: COMPLETED | OUTPATIENT
Start: 2024-12-13 | End: 2024-12-13

## 2024-12-13 RX ORDER — NALOXONE HYDROCHLORIDE 0.4 MG/ML
0.2 INJECTION, SOLUTION INTRAMUSCULAR; INTRAVENOUS; SUBCUTANEOUS
Status: DISCONTINUED | OUTPATIENT
Start: 2024-12-13 | End: 2024-12-14 | Stop reason: HOSPADM

## 2024-12-13 RX ORDER — FENTANYL CITRATE 50 UG/ML
25 INJECTION, SOLUTION INTRAMUSCULAR; INTRAVENOUS EVERY 5 MIN PRN
Status: DISCONTINUED | OUTPATIENT
Start: 2024-12-13 | End: 2024-12-13 | Stop reason: HOSPADM

## 2024-12-13 RX ORDER — POLYETHYLENE GLYCOL 3350 17 G/17G
1 POWDER, FOR SOLUTION ORAL DAILY
Qty: 7 PACKET | Refills: 0 | Status: SHIPPED | OUTPATIENT
Start: 2024-12-13

## 2024-12-13 RX ORDER — SODIUM CHLORIDE, SODIUM LACTATE, POTASSIUM CHLORIDE, CALCIUM CHLORIDE 600; 310; 30; 20 MG/100ML; MG/100ML; MG/100ML; MG/100ML
INJECTION, SOLUTION INTRAVENOUS CONTINUOUS
Status: DISCONTINUED | OUTPATIENT
Start: 2024-12-13 | End: 2024-12-13 | Stop reason: HOSPADM

## 2024-12-13 RX ORDER — HYDROMORPHONE HCL IN WATER/PF 6 MG/30 ML
0.2 PATIENT CONTROLLED ANALGESIA SYRINGE INTRAVENOUS EVERY 5 MIN PRN
Status: DISCONTINUED | OUTPATIENT
Start: 2024-12-13 | End: 2024-12-13 | Stop reason: HOSPADM

## 2024-12-13 RX ORDER — AMOXICILLIN 250 MG
1 CAPSULE ORAL 2 TIMES DAILY
Status: DISCONTINUED | OUTPATIENT
Start: 2024-12-13 | End: 2024-12-14 | Stop reason: HOSPADM

## 2024-12-13 RX ORDER — DEXAMETHASONE SODIUM PHOSPHATE 4 MG/ML
4 INJECTION, SOLUTION INTRA-ARTICULAR; INTRALESIONAL; INTRAMUSCULAR; INTRAVENOUS; SOFT TISSUE
Status: DISCONTINUED | OUTPATIENT
Start: 2024-12-13 | End: 2024-12-13 | Stop reason: HOSPADM

## 2024-12-13 RX ORDER — KETOROLAC TROMETHAMINE 15 MG/ML
15 INJECTION, SOLUTION INTRAMUSCULAR; INTRAVENOUS EVERY 6 HOURS
Status: DISCONTINUED | OUTPATIENT
Start: 2024-12-13 | End: 2024-12-13

## 2024-12-13 RX ORDER — BISACODYL 10 MG
10 SUPPOSITORY, RECTAL RECTAL DAILY PRN
Status: DISCONTINUED | OUTPATIENT
Start: 2024-12-13 | End: 2024-12-14 | Stop reason: HOSPADM

## 2024-12-13 RX ORDER — OXYCODONE HYDROCHLORIDE 10 MG/1
10 TABLET ORAL EVERY 4 HOURS PRN
Status: DISCONTINUED | OUTPATIENT
Start: 2024-12-13 | End: 2024-12-14 | Stop reason: HOSPADM

## 2024-12-13 RX ORDER — HYDROMORPHONE HCL IN WATER/PF 6 MG/30 ML
0.2 PATIENT CONTROLLED ANALGESIA SYRINGE INTRAVENOUS
Status: DISCONTINUED | OUTPATIENT
Start: 2024-12-13 | End: 2024-12-14 | Stop reason: HOSPADM

## 2024-12-13 RX ORDER — PROCHLORPERAZINE MALEATE 5 MG/1
5 TABLET ORAL EVERY 6 HOURS PRN
Status: DISCONTINUED | OUTPATIENT
Start: 2024-12-13 | End: 2024-12-14 | Stop reason: HOSPADM

## 2024-12-13 RX ORDER — ACETAMINOPHEN 325 MG/1
975 TABLET ORAL EVERY 8 HOURS
Status: DISCONTINUED | OUTPATIENT
Start: 2024-12-13 | End: 2024-12-14 | Stop reason: HOSPADM

## 2024-12-13 RX ORDER — BUPROPION HYDROCHLORIDE 150 MG/1
150 TABLET ORAL EVERY MORNING
Status: DISCONTINUED | OUTPATIENT
Start: 2024-12-14 | End: 2024-12-14 | Stop reason: HOSPADM

## 2024-12-13 RX ORDER — ACETAMINOPHEN 325 MG/1
650 TABLET ORAL EVERY 4 HOURS PRN
Qty: 100 TABLET | Refills: 0 | Status: SHIPPED | OUTPATIENT
Start: 2024-12-13

## 2024-12-13 RX ORDER — ACETAMINOPHEN 325 MG/1
650 TABLET ORAL EVERY 4 HOURS PRN
Status: DISCONTINUED | OUTPATIENT
Start: 2024-12-16 | End: 2024-12-14 | Stop reason: HOSPADM

## 2024-12-13 RX ORDER — ONDANSETRON 2 MG/ML
4 INJECTION INTRAMUSCULAR; INTRAVENOUS EVERY 6 HOURS PRN
Status: DISCONTINUED | OUTPATIENT
Start: 2024-12-13 | End: 2024-12-14 | Stop reason: HOSPADM

## 2024-12-13 RX ORDER — ONDANSETRON 4 MG/1
4 TABLET, ORALLY DISINTEGRATING ORAL EVERY 6 HOURS PRN
Status: DISCONTINUED | OUTPATIENT
Start: 2024-12-13 | End: 2024-12-14 | Stop reason: HOSPADM

## 2024-12-13 RX ORDER — TRANEXAMIC ACID 650 MG/1
1950 TABLET ORAL ONCE
Status: COMPLETED | OUTPATIENT
Start: 2024-12-13 | End: 2024-12-13

## 2024-12-13 RX ORDER — HYDROMORPHONE HCL IN WATER/PF 6 MG/30 ML
0.4 PATIENT CONTROLLED ANALGESIA SYRINGE INTRAVENOUS EVERY 5 MIN PRN
Status: DISCONTINUED | OUTPATIENT
Start: 2024-12-13 | End: 2024-12-13 | Stop reason: HOSPADM

## 2024-12-13 RX ORDER — VALACYCLOVIR HYDROCHLORIDE 500 MG/1
500 TABLET, FILM COATED ORAL 2 TIMES DAILY
Status: DISCONTINUED | OUTPATIENT
Start: 2024-12-13 | End: 2024-12-14 | Stop reason: HOSPADM

## 2024-12-13 RX ORDER — PANTOPRAZOLE SODIUM 40 MG/1
40 TABLET, DELAYED RELEASE ORAL
Status: DISCONTINUED | OUTPATIENT
Start: 2024-12-14 | End: 2024-12-14 | Stop reason: HOSPADM

## 2024-12-13 RX ORDER — NALOXONE HYDROCHLORIDE 0.4 MG/ML
0.4 INJECTION, SOLUTION INTRAMUSCULAR; INTRAVENOUS; SUBCUTANEOUS
Status: DISCONTINUED | OUTPATIENT
Start: 2024-12-13 | End: 2024-12-14 | Stop reason: HOSPADM

## 2024-12-13 RX ORDER — NALOXONE HYDROCHLORIDE 0.4 MG/ML
0.1 INJECTION, SOLUTION INTRAMUSCULAR; INTRAVENOUS; SUBCUTANEOUS
Status: DISCONTINUED | OUTPATIENT
Start: 2024-12-13 | End: 2024-12-13 | Stop reason: HOSPADM

## 2024-12-13 RX ORDER — FENTANYL CITRATE 50 UG/ML
50 INJECTION, SOLUTION INTRAMUSCULAR; INTRAVENOUS ONCE
Status: COMPLETED | OUTPATIENT
Start: 2024-12-13 | End: 2024-12-13

## 2024-12-13 RX ORDER — IBUPROFEN 600 MG/1
600 TABLET, FILM COATED ORAL EVERY 6 HOURS PRN
Qty: 30 TABLET | Refills: 0 | Status: SHIPPED | OUTPATIENT
Start: 2024-12-13 | End: 2024-12-14

## 2024-12-13 RX ORDER — ONDANSETRON 4 MG/1
4 TABLET, ORALLY DISINTEGRATING ORAL EVERY 30 MIN PRN
Status: DISCONTINUED | OUTPATIENT
Start: 2024-12-13 | End: 2024-12-13 | Stop reason: HOSPADM

## 2024-12-13 RX ORDER — OXYCODONE HYDROCHLORIDE 5 MG/1
5 TABLET ORAL EVERY 4 HOURS PRN
Status: DISCONTINUED | OUTPATIENT
Start: 2024-12-13 | End: 2024-12-14 | Stop reason: HOSPADM

## 2024-12-13 RX ORDER — PREGABALIN 25 MG/1
50 CAPSULE ORAL 3 TIMES DAILY
Status: DISCONTINUED | OUTPATIENT
Start: 2024-12-13 | End: 2024-12-14 | Stop reason: HOSPADM

## 2024-12-13 RX ORDER — ASPIRIN 81 MG/1
81 TABLET ORAL 2 TIMES DAILY
Status: DISCONTINUED | OUTPATIENT
Start: 2024-12-13 | End: 2024-12-14 | Stop reason: HOSPADM

## 2024-12-13 RX ORDER — AMOXICILLIN 250 MG
1-2 CAPSULE ORAL 2 TIMES DAILY
Qty: 30 TABLET | Refills: 0 | Status: SHIPPED | OUTPATIENT
Start: 2024-12-13 | End: 2024-12-17

## 2024-12-13 RX ORDER — SODIUM CHLORIDE, SODIUM LACTATE, POTASSIUM CHLORIDE, CALCIUM CHLORIDE 600; 310; 30; 20 MG/100ML; MG/100ML; MG/100ML; MG/100ML
INJECTION, SOLUTION INTRAVENOUS CONTINUOUS
Status: DISCONTINUED | OUTPATIENT
Start: 2024-12-13 | End: 2024-12-14

## 2024-12-13 RX ORDER — ONDANSETRON 2 MG/ML
4 INJECTION INTRAMUSCULAR; INTRAVENOUS EVERY 30 MIN PRN
Status: DISCONTINUED | OUTPATIENT
Start: 2024-12-13 | End: 2024-12-13 | Stop reason: HOSPADM

## 2024-12-13 RX ORDER — OXYCODONE HYDROCHLORIDE 5 MG/1
5-10 TABLET ORAL EVERY 4 HOURS PRN
Qty: 26 TABLET | Refills: 0 | Status: SHIPPED | OUTPATIENT
Start: 2024-12-13 | End: 2024-12-14

## 2024-12-13 RX ORDER — CEFAZOLIN SODIUM/WATER 2 G/20 ML
2 SYRINGE (ML) INTRAVENOUS SEE ADMIN INSTRUCTIONS
Status: DISCONTINUED | OUTPATIENT
Start: 2024-12-13 | End: 2024-12-13 | Stop reason: HOSPADM

## 2024-12-13 RX ORDER — LIDOCAINE 40 MG/G
CREAM TOPICAL
Status: DISCONTINUED | OUTPATIENT
Start: 2024-12-13 | End: 2024-12-13 | Stop reason: HOSPADM

## 2024-12-13 RX ORDER — HYDROMORPHONE HCL IN WATER/PF 6 MG/30 ML
0.4 PATIENT CONTROLLED ANALGESIA SYRINGE INTRAVENOUS
Status: DISCONTINUED | OUTPATIENT
Start: 2024-12-13 | End: 2024-12-14 | Stop reason: HOSPADM

## 2024-12-13 RX ORDER — ASPIRIN 325 MG
325 TABLET, DELAYED RELEASE (ENTERIC COATED) ORAL DAILY
Qty: 30 TABLET | Refills: 0 | Status: SHIPPED | OUTPATIENT
Start: 2024-12-13

## 2024-12-13 RX ORDER — LIDOCAINE 40 MG/G
CREAM TOPICAL
Status: DISCONTINUED | OUTPATIENT
Start: 2024-12-13 | End: 2024-12-14 | Stop reason: HOSPADM

## 2024-12-13 RX ORDER — SERTRALINE HYDROCHLORIDE 100 MG/1
100 TABLET, FILM COATED ORAL DAILY
Status: DISCONTINUED | OUTPATIENT
Start: 2024-12-14 | End: 2024-12-14 | Stop reason: HOSPADM

## 2024-12-13 RX ORDER — POLYETHYLENE GLYCOL 3350 17 G/17G
17 POWDER, FOR SOLUTION ORAL DAILY
Status: DISCONTINUED | OUTPATIENT
Start: 2024-12-14 | End: 2024-12-14 | Stop reason: HOSPADM

## 2024-12-13 RX ORDER — FENTANYL CITRATE 50 UG/ML
50 INJECTION, SOLUTION INTRAMUSCULAR; INTRAVENOUS EVERY 5 MIN PRN
Status: DISCONTINUED | OUTPATIENT
Start: 2024-12-13 | End: 2024-12-13 | Stop reason: HOSPADM

## 2024-12-13 RX ADMIN — SENNOSIDES AND DOCUSATE SODIUM 1 TABLET: 50; 8.6 TABLET ORAL at 20:11

## 2024-12-13 RX ADMIN — FENTANYL CITRATE 50 MCG: 50 INJECTION, SOLUTION INTRAMUSCULAR; INTRAVENOUS at 14:52

## 2024-12-13 RX ADMIN — OXYCODONE HYDROCHLORIDE 5 MG: 5 TABLET ORAL at 15:57

## 2024-12-13 RX ADMIN — OXYCODONE HYDROCHLORIDE 5 MG: 5 TABLET ORAL at 21:19

## 2024-12-13 RX ADMIN — TRANEXAMIC ACID 1950 MG: 650 TABLET ORAL at 11:01

## 2024-12-13 RX ADMIN — SODIUM CHLORIDE, POTASSIUM CHLORIDE, SODIUM LACTATE AND CALCIUM CHLORIDE: 600; 310; 30; 20 INJECTION, SOLUTION INTRAVENOUS at 17:19

## 2024-12-13 RX ADMIN — ASPIRIN 81 MG: 81 TABLET, COATED ORAL at 20:11

## 2024-12-13 RX ADMIN — SODIUM CHLORIDE, POTASSIUM CHLORIDE, SODIUM LACTATE AND CALCIUM CHLORIDE: 600; 310; 30; 20 INJECTION, SOLUTION INTRAVENOUS at 11:38

## 2024-12-13 RX ADMIN — ACETAMINOPHEN 975 MG: 325 TABLET, FILM COATED ORAL at 17:35

## 2024-12-13 RX ADMIN — PREGABALIN 50 MG: 25 CAPSULE ORAL at 20:24

## 2024-12-13 RX ADMIN — FENTANYL CITRATE 50 MCG: 50 INJECTION, SOLUTION INTRAMUSCULAR; INTRAVENOUS at 14:59

## 2024-12-13 RX ADMIN — CEFAZOLIN SODIUM 2 G: 2 INJECTION, SOLUTION INTRAVENOUS at 20:11

## 2024-12-13 RX ADMIN — VALACYCLOVIR HYDROCHLORIDE 500 MG: 500 TABLET, FILM COATED ORAL at 21:19

## 2024-12-13 RX ADMIN — FENTANYL CITRATE 50 MCG: 50 INJECTION, SOLUTION INTRAMUSCULAR; INTRAVENOUS at 12:01

## 2024-12-13 RX ADMIN — FENTANYL CITRATE 50 MCG: 50 INJECTION, SOLUTION INTRAMUSCULAR; INTRAVENOUS at 15:08

## 2024-12-13 ASSESSMENT — ACTIVITIES OF DAILY LIVING (ADL)
ADLS_ACUITY_SCORE: 40
ADLS_ACUITY_SCORE: 45
ADLS_ACUITY_SCORE: 39
ADLS_ACUITY_SCORE: 37
ADLS_ACUITY_SCORE: 39
ADLS_ACUITY_SCORE: 35
ADLS_ACUITY_SCORE: 40
ADLS_ACUITY_SCORE: 39
ADLS_ACUITY_SCORE: 37

## 2024-12-13 NOTE — OP NOTE
[facility]    Operative Note    Pre-operative diagnosis: 84-year-old female presenting with chronic left hip/groin pain due to osteoarthritic changes and in setting of rheumatoid arthritis which are insufficiently responding to nonsurgical treatment options.   Post-operative diagnosis Same as pre-operative diagnosis    Procedure: Left hybrid total hip arthroplasty, Left - Hip    Surgeon: Surgeons and Role:     * Eugenio Jeffries MD - Primary      * Enedelia Reid - Resident    Anesthesia: Choice   Estimated Blood Loss: Less than 100 ml    Drains: None  Specimens: * No specimens in log *  Findings:   None.  Complications: None.  Implants:   Implant Name Type Inv. Item Serial No.  Lot No. LRB No. Used Action   BONE CEMENT SIMPLEX FULL DOSE 6191-1-001 - [id number] Cement, Bone BONE CEMENT SIMPLEX FULL DOSE 6191-1-001  [employer] [id number] Left 2 Implanted   TRIDENT II TRITANIUM CLUSTERHOLE 52E - [id number] Total Joint Component/Insert TRIDENT II TRITANIUM CLUSTERHOLE 52E  [employer] [id number] Left 1 Implanted   INSERT ACE 36MM 0DEG X3 723-00-36E - [id number] Total Joint Component/Insert INSERT ACE 36MM 0DEG X3 723-00-36E  [employer] [id number] Left 1 Implanted   BONE CEMENT RESTRICTOR ALANIS FEMORAL 25MM 714194 - [id number] Cement, Bone BONE CEMENT RESTRICTOR ALANIS FEMORAL 25MM [id number]  [employer] [id number] Left 1 Implanted   IMP STEM FEMORAL HIP STRK OFST [id number] [facility] 0580-1-501 - [id number] Total Joint Component/Insert IMP STEM FEMORAL HIP STRK OFST [id number] [facility] 0580-1-501 [id number] [employer] [id number] Left 1 Implanted   IMP HEAD FEMORAL STRK BIOLOX DELTA CERAMIC 36MM +2.5MM - [id number] Total Joint Component/Insert IMP HEAD FEMORAL STRK BIOLOX DELTA CERAMIC 36MM +2.5MM  [employer] [id number] Left 1 Implanted       COMPONENTS USED:  Kitty Tritanium acetabular component size 52 millimeter  2.   Lane Talmage femoral  component size 50-1  3.   Tori chromium-cobalt femoral head size 36+2.5 mm    FINDINGS: Cartilage loss, irregularity of the femoral head, diffuse cartilage loss, osteophyte formation of the native acetabulum.  Acetabular component well positioned and secure with PMMA cement. Femoral component well positioned and fixed with PMMA cement.  Intraoperative stability with full extension and ER, flexion to 90 with IR to 80, and in the sleep position.  Appropriate limb length.       DESCRIPTION OF PROCEDURE: Patient was seen in the preoperative area where we again reviewed the risks, benefits, and alternatives to total hip arthroplasty.  Patient understands and agrees to the treatment plan as set forth. Informed written consent was obtained.  The operative left hip was marked with an indelible marker after patient confirmation of the operative site.  All the patient's questions were answered.  The patient was taken to the operating room and underwent induction of  Spinal anesthesia.  The patient was placed on the operating table in the lateral decubitus position with the operative site up.   An SCD was placed on the nonoperative leg.   Bony prominences were well padded and was secured to the table with the Longoria positioner. The patient was prepped and draped in the normal sterile fashion.         After the completion of a timeout procedure a posterior approach to the hip joint was performed making a incision over the greater trochanter and taking this down through the subcutaneous tissue. The gluteus jolie and IT band were identified and then split in the direction of its fibers. A charnley retractor was placed and hemostasis was obtained.  After internal rotation of the femur, the piriformis tendon and external rotators were detached.  A posterior capsulotomy was performed.  The capsule and pirformis was tagged using a #1 Vicryl suture.  Care was taken to do incised the inferior and superior capsule to relieve  tension and the limb was positioned in the sleep position and the hip was dislocated.  The soft tissues under the greater trochanter were debrided, exposing the neck.  The femoral neck osteotomy was made in accordance with the preoperative plan, approximately 10 mm above the level of the lesser trochanter.  The femoral head was removed and noted to have diffuse, extensive cartilage loss.      The acetabulum was exposed with an anterior abdul's crook retractor and posteroinferior retractor as well as a self-retaining retractor.  The remaining acetabular labrum was removed.  The patient was noted to have anterior and inferior osteophytes.  The pulvinar was removed and the medial wall was identified.  We began reaming with care to maintain the anterior and posterior walls.  We began with a size 50 reamer and reamed to 52.  At this point the sclerotic bone was removed back to healthy bleeding cancelleous bone. Next the definitive Tritanium 52mm cup was placed taking into account the appropriate inclination and anteversion.  Liner neutral was placed.     Attention was turned to the femur, which was exposed in the standard fashion with a femoral elevator.  Care was taken to not damage the gluteus medius.  A box osteotome and rongeur was used to remove residual lateral neck.  The canal was identified and reamed with a Charnley Awl.  The femoral broaches was positioned in the appropriate anteversion, care was taken to broach the lateral cancellous bone.  We broached, starting with a size 37.5-0, up to size 50-1.  Care was taken to minimize risk of calcar fracture.  The final trial fit well.  A trial neck was placed with a 36+2.5  head.  The hip was reduced.  The stability was examined and  was found to be secure and stable in full extension and external rotation of > 45 degrees as well as flexion of 90 degrees combined with internal rotation > 80 degrees.  Leg lengths were judged to be within 5 mm of symmetry.      The  hip was dislocated and the trial femoral component was removed.  The femur was exposed.  A cement restrictor was placed at the appropriate depth.  The intramedullary canal was copiously irrigated and dried with a Ray-Nidia.  Simultaneously the simplex cement was mixed on the back table.  The centralizer with 1 leg on the lateral aspect of the prosthesis was placed.  After 4 minutes the cement was inserted into the intramedullary canal and pressurized for 1 minute.  Now the definitive 50-1 [place]osc[place] stem was placed at the appropriate depth taking to account the appropriate anteversion.  Cement was cured and excess cement was removed.   The hip was trialed with various head lengths and the standard head had the best fit.  Again, the  stability was examined and  was found to be secure and stable in full extension and external rotation of > 45 degrees as well as flexion of 90 degrees combined with internal rotation > 80 degrees.  Leg lengths were judged to be within 5 mm of symmetry as evaluated by limb palpation. The hip was dislocated and the trial head was removed.  The trunion was washed and dried and the final 36+2.5 mm standard head was impacted into place.  The hip was reduced and again stable as listed above.       The hip was lavaged with dilute betaine irrigant followed by sterile saline.  Periarticular injection of ropivicaine, toradol, and epi was injected into the soft tissue.  The capsule was reattached to the greater trochanter using a 2 mm drill and suture passer.  The piriformis and external rotators were repaired to the medius tendon.  The IT and gluteal fascia was closed with #1 Vicryl figure of 8 interrupted sutures.  The deep dermal layer was closed with 0 Vicryl and 2-0 Vicryl. The skin was closed with 3-0 PDS, Steri-Strips and a sterile dressing consisting of alginate and Tegaderm. The patient was positioned supine and awoken from anesthesia.  The patient was transferred to the PACU in stable  condition.        POSTOPERATIVE PLAN:  The patient will be admitted to the floor for pain control and monitoring.  Weight bearing: As tolerated  Anticoagulation: Aspirin 162 mg daily for 4 weeks  Precautions: Posterior hip precautions  Antibiotics per protocol  X-ray in PACU  PT/OT  Patient will discharge POD 1-2 with family when all requirements are met.  Clinic visit 2 and 6 weeks          Eugenio Jeffries MD      Adult Reconstruction  [facility] Department of Orthopaedic Surgery  Pager [phone]

## 2024-12-14 ENCOUNTER — APPOINTMENT (OUTPATIENT)
Dept: PHYSICAL THERAPY | Facility: CLINIC | Age: 84
End: 2024-12-14
Attending: STUDENT IN AN ORGANIZED HEALTH CARE EDUCATION/TRAINING PROGRAM
Payer: COMMERCIAL

## 2024-12-14 ENCOUNTER — APPOINTMENT (OUTPATIENT)
Dept: OCCUPATIONAL THERAPY | Facility: CLINIC | Age: 84
End: 2024-12-14
Attending: STUDENT IN AN ORGANIZED HEALTH CARE EDUCATION/TRAINING PROGRAM
Payer: COMMERCIAL

## 2024-12-14 VITALS
WEIGHT: 206.1 LBS | TEMPERATURE: 97 F | SYSTOLIC BLOOD PRESSURE: 114 MMHG | HEART RATE: 80 BPM | DIASTOLIC BLOOD PRESSURE: 64 MMHG | OXYGEN SATURATION: 93 % | BODY MASS INDEX: 33.78 KG/M2 | RESPIRATION RATE: 14 BRPM

## 2024-12-14 LAB
ANION GAP SERPL CALCULATED.3IONS-SCNC: 13 MMOL/L (ref 7–15)
BUN SERPL-MCNC: 10 MG/DL (ref 8–23)
CALCIUM SERPL-MCNC: 8.5 MG/DL (ref 8.8–10.4)
CHLORIDE SERPL-SCNC: 101 MMOL/L (ref 98–107)
CREAT SERPL-MCNC: 0.58 MG/DL (ref 0.51–0.95)
EGFRCR SERPLBLD CKD-EPI 2021: 90 ML/MIN/1.73M2
GLUCOSE SERPL-MCNC: 116 MG/DL (ref 70–99)
HCO3 SERPL-SCNC: 22 MMOL/L (ref 22–29)
HGB BLD-MCNC: 10.3 G/DL (ref 11.7–15.7)
POTASSIUM SERPL-SCNC: 3.7 MMOL/L (ref 3.4–5.3)
SODIUM SERPL-SCNC: 136 MMOL/L (ref 135–145)

## 2024-12-14 PROCEDURE — 250N000011 HC RX IP 250 OP 636: Mod: JZ | Performed by: STUDENT IN AN ORGANIZED HEALTH CARE EDUCATION/TRAINING PROGRAM

## 2024-12-14 PROCEDURE — 80048 BASIC METABOLIC PNL TOTAL CA: CPT

## 2024-12-14 PROCEDURE — 97161 PT EVAL LOW COMPLEX 20 MIN: CPT | Mod: GP

## 2024-12-14 PROCEDURE — 250N000013 HC RX MED GY IP 250 OP 250 PS 637

## 2024-12-14 PROCEDURE — 36415 COLL VENOUS BLD VENIPUNCTURE: CPT

## 2024-12-14 PROCEDURE — 97535 SELF CARE MNGMENT TRAINING: CPT | Mod: GO

## 2024-12-14 PROCEDURE — 97110 THERAPEUTIC EXERCISES: CPT | Mod: GP

## 2024-12-14 PROCEDURE — 250N000013 HC RX MED GY IP 250 OP 250 PS 637: Performed by: INTERNAL MEDICINE

## 2024-12-14 PROCEDURE — 84520 ASSAY OF UREA NITROGEN: CPT

## 2024-12-14 PROCEDURE — 97165 OT EVAL LOW COMPLEX 30 MIN: CPT | Mod: GO

## 2024-12-14 PROCEDURE — 85018 HEMOGLOBIN: CPT | Performed by: STUDENT IN AN ORGANIZED HEALTH CARE EDUCATION/TRAINING PROGRAM

## 2024-12-14 PROCEDURE — 250N000013 HC RX MED GY IP 250 OP 250 PS 637: Performed by: STUDENT IN AN ORGANIZED HEALTH CARE EDUCATION/TRAINING PROGRAM

## 2024-12-14 PROCEDURE — 82435 ASSAY OF BLOOD CHLORIDE: CPT

## 2024-12-14 RX ORDER — HYDROMORPHONE HYDROCHLORIDE 2 MG/1
2-4 TABLET ORAL EVERY 6 HOURS PRN
Qty: 10 TABLET | Refills: 0 | Status: SHIPPED | OUTPATIENT
Start: 2024-12-14 | End: 2024-12-17

## 2024-12-14 RX ORDER — UPADACITINIB 15 MG/1
15 TABLET, EXTENDED RELEASE ORAL DAILY
Qty: 30 TABLET | Refills: 9 | Status: SHIPPED | OUTPATIENT
Start: 2024-12-31

## 2024-12-14 RX ADMIN — ACETAMINOPHEN 975 MG: 325 TABLET, FILM COATED ORAL at 09:16

## 2024-12-14 RX ADMIN — PANTOPRAZOLE SODIUM 40 MG: 40 TABLET, DELAYED RELEASE ORAL at 05:42

## 2024-12-14 RX ADMIN — ASPIRIN 81 MG: 81 TABLET, COATED ORAL at 08:03

## 2024-12-14 RX ADMIN — SENNOSIDES AND DOCUSATE SODIUM 1 TABLET: 50; 8.6 TABLET ORAL at 08:03

## 2024-12-14 RX ADMIN — ACETAMINOPHEN 975 MG: 325 TABLET, FILM COATED ORAL at 00:26

## 2024-12-14 RX ADMIN — OXYCODONE HYDROCHLORIDE 5 MG: 5 TABLET ORAL at 08:03

## 2024-12-14 RX ADMIN — CEFAZOLIN SODIUM 2 G: 2 INJECTION, SOLUTION INTRAVENOUS at 03:12

## 2024-12-14 RX ADMIN — PREGABALIN 50 MG: 25 CAPSULE ORAL at 08:03

## 2024-12-14 RX ADMIN — BUPROPION HYDROCHLORIDE 150 MG: 150 TABLET, EXTENDED RELEASE ORAL at 08:03

## 2024-12-14 RX ADMIN — SERTRALINE 100 MG: 100 TABLET, FILM COATED ORAL at 08:03

## 2024-12-14 RX ADMIN — VALACYCLOVIR HYDROCHLORIDE 500 MG: 500 TABLET, FILM COATED ORAL at 08:04

## 2024-12-14 ASSESSMENT — ACTIVITIES OF DAILY LIVING (ADL)
ADLS_ACUITY_SCORE: 45
ADLS_ACUITY_SCORE: 43
ADLS_ACUITY_SCORE: 44
ADLS_ACUITY_SCORE: 45
ADLS_ACUITY_SCORE: 44
ADLS_ACUITY_SCORE: 43
ADLS_ACUITY_SCORE: 44

## 2024-12-14 NOTE — CONSULTS
Brief Hospital Consult Note  12/14/2024 8:08 AM       84-year-old female past medical history of SHERRY uses CPAP, COPD, chronic anemia, RA on methotrexate and biologic, GERD, obesity, chronic pain who had on 12/13/2024 for elective left sided hybrid total hip arthroplasty under general anesthesia.   mL.  No surgical or anesthetic complication.  Admitted for postoperative monitoring.  No acute medical events overnight.  Per nursing report overall she was progressing well.  Calm and cooperative.  Minimal pain overnight.  Had refused oxycodone stating she did not needed.  And in fact no as needed medications have been required.    /64 (BP Location: Left arm)   Pulse 80   Temp 97  F (36.1  C) (Temporal)   Resp 14   Wt 93.5 kg (206 lb 1.6 oz)   SpO2 93%   BMI 33.78 kg/m         MDM:  Postoperative day #1 BMP within normal limits.  Hemoglobin 10.3 down from a baseline of 12.5.    A/P:  --Acute on chronic blood loss anemia.  As to be expected.  Hemoglobin stable.  Monitor as needed.  Continue iron replacement.  -- SHERRY: No acute issues.  Continue pulmonary hygiene and CPAP as needed.  -- COPD: stable.  No reports of acute exacerbation.  Review of vital signs shows no as needed oxygen use.  -- RA: Okay to continue methotrexate during the perioperative period.  Hold Rinvoq for 14 days post to surgery.  Resume when okay with surgeon.  Was tentatively scheduled to discharge on ibuprofen.  However there is a education interaction between ibuprofen (NSAIDs) and methotrexate.  Concomitant administration of NSAIDs with methotrexate has been reported to elevated prolonged serum methotrexate levels resulting in death from severe hematologic and GI toxicities.  NSAIDs may reduce the tubular secretion of methotrexate and enhance toxicity.  Therefore recommend do not use NSAIDs on routine as needed basis.  Follow-up with PCP as needed.  -- GERD: Continue PPI.  -- Chronic pain: Continue pregabalin.  Is on PTA Norco with  5/325.  Pain management per primary.  Per review of record it appears that she is discharged on APAP and oxycodone.  -- Status post left-sided hybrid total hip arthroplasty: As above.  Management per orthopedic.    Medicine team will not formally see.  She is likely being discharged to home later today.  However if any acute medical issues arise today please feel free to page me.    Aydin Neely Ed.D, APRN, CNP

## 2024-12-14 NOTE — PLAN OF CARE
Goal Outcome Evaluation:      Plan of Care Reviewed With: patient    Overall Patient Progress: improvingOverall Patient Progress: improving     Discharge Note    Patient discharged to home via private vehicle  accompanied by daughter.  IV: Discontinued  Prescriptions filled and given to patient/family.   Belongings reviewed and sent with patient.   Home medications returned to patient: NA  Equipment sent with: patient- Home walker remains with patient at discharge  patient and family verbalizes understanding of discharge instructions. AVS given to patient.    Dressing changed prior to discharge per Surgery. Pain treated with oxycodone and tylenol. Patient stated that oxycodone makes her itchy- Surgeon aware for discharge medication.     Patient discharged at 1210    Problem: Adult Inpatient Plan of Care  Goal: Plan of Care Review  Outcome: Met  Flowsheets (Taken 12/14/2024 1101)  Plan of Care Reviewed With: patient  Overall Patient Progress: improving  Goal: Patient-Specific Goal (Individualized)  Outcome: Met  Goal: Absence of Hospital-Acquired Illness or Injury  Outcome: Met  Intervention: Identify and Manage Fall Risk  Recent Flowsheet Documentation  Taken 12/14/2024 1018 by Chloé Reyes RN  Safety Promotion/Fall Prevention:   assistive device/personal items within reach   activity supervised   room organization consistent   safety round/check completed  Intervention: Prevent Skin Injury  Recent Flowsheet Documentation  Taken 12/14/2024 1018 by Chloé Reyes RN  Body Position: position changed independently  Intervention: Prevent and Manage VTE (Venous Thromboembolism) Risk  Recent Flowsheet Documentation  Taken 12/14/2024 1018 by Chloé Reyes RN  VTE Prevention/Management: SCDs off (sequential compression devices)  Intervention: Prevent Infection  Recent Flowsheet Documentation  Taken 12/14/2024 1018 by Chloé Reyes RN  Infection Prevention:   hand hygiene promoted   rest/sleep promoted  Goal:  Optimal Comfort and Wellbeing  Outcome: Met  Intervention: Monitor Pain and Promote Comfort  Recent Flowsheet Documentation  Taken 12/14/2024 0916 by Chloé Reyes RN  Pain Management Interventions: medication (see MAR)  Goal: Readiness for Transition of Care  Outcome: Met     Problem: Hip Arthroplasty  Goal: Optimal Coping  Outcome: Met  Goal: Absence of Bleeding  Outcome: Met  Goal: Effective Bowel Elimination  Outcome: Met  Goal: Fluid and Electrolyte Balance  Outcome: Met  Goal: Optimal Functional Ability  Outcome: Met  Intervention: Promote Optimal Functional Status  Recent Flowsheet Documentation  Taken 12/14/2024 1018 by Chloé Reyes RN  Assistive Device Utilized:   gait belt   walker  Activity Management: activity adjusted per tolerance  Goal: Absence of Infection Signs and Symptoms  Outcome: Met  Goal: Intact Neurovascular Status  Outcome: Met  Goal: Anesthesia/Sedation Recovery  Outcome: Met  Intervention: Optimize Anesthesia Recovery  Recent Flowsheet Documentation  Taken 12/14/2024 1018 by Chloé Reyes RN  Safety Promotion/Fall Prevention:   assistive device/personal items within reach   activity supervised   room organization consistent   safety round/check completed  Goal: Optimal Pain Control and Function  Outcome: Met  Intervention: Prevent or Manage Pain  Recent Flowsheet Documentation  Taken 12/14/2024 0916 by Chloé Reyes, RN  Pain Management Interventions: medication (see MAR)  Goal: Nausea and Vomiting Relief  Outcome: Met  Goal: Effective Urinary Elimination  Outcome: Met  Goal: Effective Oxygenation and Ventilation  Outcome: Met  Intervention: Optimize Oxygenation and Ventilation  Recent Flowsheet Documentation  Taken 12/14/2024 1018 by Chloé Reyes RN  Cough And Deep Breathing: done independently per patient  Activity Management: activity adjusted per tolerance

## 2024-12-14 NOTE — PROGRESS NOTES
Orthopaedic Surgery Progress Note:       Subjective:   NAEO. Patient reports to be doing well. Pain well controlled on current regimen. Prefers Dilaudid over Oxycone because of itching. Denies new onset tingling/numbness in operative extremity. Denies fever/chills/SOB/nause/vomiting. Oral intake is adequate. Voids spontaneously. BM -. Cleared by PT    Objective:   Temp:  [96.9  F (36.1  C)-97.8  F (36.6  C)] 97  F (36.1  C)  Pulse:  [68-97] 80  Resp:  [8-17] 14  BP: ()/(44-96) 114/64  SpO2:  [90 %-99 %] 93 %    Gen: NAD. Resting comfortably in bed  Resp: Breathing comfortably on RA  Left LE:  Dressing/ACE is saturates dressing.  SILT in femoral, saphenous, sural, deep peroneal, superficial peroneal, and tibial n dist.   Fires EHL/FHL/TA/Gastroc with 5/5 strength    PT and DP pulses intact, 2+ and foot is wwp    Labs:  Recent Labs   Lab 12/14/24  0622   HGB 10.3*          Assessment & Plan:   84 year old female now s/p left WALTER on 12/13/2024 with Dr. Jeffries    Activity: WBAT BLE. ROM as tolerated. Posterior hip precautions.   : Voids spontaneously.  Antibiotics: Ancef x 24 hours post op for surgical prophylaxis   Dressings: Tegaderm and Alginate redressing today and to remain in place until POD 7-10.   Diet: ADAT  Pain: PO/IV meds. Transition to PO meds only as patient tolerates  DVT ppx: Aspirin 161 mg daily starting POD#0. Continue x 4 weeks post op.  Imaging: Post op films reviewed and are satisfactory. No further imaging need at this time.  Labs: Daily Hgb  PT/OT: Mobility, strength  training, ROM, gait training, ADLs  Consults: Appreciate medicine co-management.  Dispo: Pending pain control and PT/OT recs. Expect patient to d/c today to home with family.  Follow up: 2 and 6 weeks post op w/ Dr. Jeffries    =============================================  Eugenio Jeffries MD 12/14/2024     Adult Reconstruction  [facility] Department of Orthopaedic Surgery  Pager [phone]  [phone]

## 2024-12-14 NOTE — PROGRESS NOTES
12/14/24 0900   Appointment Info   Signing Clinician's Name / Credentials (OT) Renetta Stephens OTD, OTR/L   Rehab Comments (OT) Posterior precautions, WBAT   Quick Adds   Quick Adds [facility] Auth & Certification   Self-Care   Usual Activity Tolerance good   Current Activity Tolerance moderate   Regular Exercise No   Equipment Currently Used at Home walker, rolling;cane, straight;grab bar, toilet;grab bar, tub/shower;other (see comments);raised toilet seat  (sock aide, reacher)   Fall history within last six months no   Activity/Exercise/Self-Care Comment Pt ind at baseline in ADLs   Instrumental Activities of Daily Living (IADL)   IADL Comments Pt ind at baseline in IADLs   General Information   Onset of Illness/Injury or Date of Surgery 12/13/24   Referring Physician Eugenio Jeffries MD   Patient/Family Therapy Goal Statement (OT) Return home and be ind.   Additional Occupational Profile Info/Pertinent History of Current Problem s/p WALTER L   Existing Precautions/Restrictions fall;no hip IR;no hip ADD past midline;90 degree hip flexion;weight bearing   Left Lower Extremity (Weight-bearing Status) weight-bearing as tolerated (WBAT)   Cognitive Status Examination   Orientation Status orientation to person, place and time   Visual Perception   Visual Impairment/Limitations corrective lenses full-time   Sensory   Sensory Quick Adds sensation intact   Pain Assessment   Patient Currently in Pain Yes, see Vital Sign flowsheet   Posture   Posture forward head position;protracted shoulders   Range of Motion Comprehensive   Comment, General Range of Motion BUE WFL   Strength Comprehensive (MMT)   Comment, General Manual Muscle Testing (MMT) Assessment BUE WFL, general weakness s/p surgical intervention   Bed Mobility   Comment (Bed Mobility) Did not assess-defer to PT. In chairat eval   Transfers   Transfer Comments SBA   Balance   Balance Comments Overall steady with FWW   Activities of Daily Living   BADL  Assessment/Intervention bathing;lower body dressing;toileting   Bathing Assessment/Intervention   Comment, (Bathing) SBA and FWW   Lower Body Dressing Assessment/Training   Comment, (Lower Body Dressing) Max A without AE   Toileting   Comment, (Toileting) SBA and FWW   Clinical Impression   Criteria for Skilled Therapeutic Interventions Met (OT) Yes, treatment indicated   OT Diagnosis Impaired ADLs   Influenced by the following impairments s/p L WALTER   OT Problem List-Impairments impacting ADL problems related to;activity tolerance impaired;mobility;strength;pain;post-surgical precautions   Assessment of Occupational Performance 1-3 Performance Deficits   Identified Performance Deficits bathing, LB dressing, home mgmt   Planned Therapy Interventions (OT) ADL retraining;strengthening;progressive activity/exercise;transfer training   Clinical Decision Making Complexity (OT) problem focused assessment/low complexity   Risk & Benefits of therapy have been explained care plan/treatment goals reviewed;evaluation/treatment results reviewed;risks/benefits reviewed;current/potential barriers reviewed;participants voiced agreement with care plan;participants included;patient   OT Total Evaluation Time   OT Eval, Low Complexity Minutes (82497) 5   Therapy Certification   Medical Diagnosis s/p WALTER   Start of Care Date 12/13/24   Certification date from 12/13/24   Certification date to 12/13/24   [facility] Authorization Information   Condition type Initial onset (within last 3 months)   Cause of current episode Post Surgical   Nature of treatment Rehabilitative   Functional ability Moderate functional limitations   Documented POC (choose all that apply) Measurable short and long term/discharge treatment goals related to physical and functional deficits.;Frequency of treatment visits and treatment activities to address deficit areas.;Patient agrees to program participation including home program   Briefly describe symptoms Posterior hip  precautions, pain   How did the symptoms start s/p surgical intervention   Average pain/intensity last 24 hours 5/10   Average pain/intensity past week 5/10   Frequency of Symptoms Frequently (51-75% of the time)   Symptom impact on ADLs Moderately   Condition change since eval No change   General health reported by patient Very good   Type of surgery 5-Joint Replacement   OT Goals   Therapy Frequency (OT) One time eval and treatment   OT Predicted Duration/Target Date for Goal Attainment 12/14/24   OT Goals Hygiene/Grooming;Lower Body Dressing;Lower Body Bathing;Toilet Transfer/Toileting   OT: Hygiene/Grooming modified independent;using adaptive equipment;within precautions;while standing   OT: Lower Body Dressing Modified independent;using adaptive equipment;within precautions;including set-up/clothing retrieval;Goal Met   OT: Lower Body Bathing Modified independent;using adaptive equipment;with precautions;Goal Met   OT: Toilet Transfer/Toileting Modified independent;toilet transfer;cleaning and garment management;using adaptive equipment;within precautions;Goal Met   Interventions   Interventions Quick Adds Self-Care/Home Management   Self-Care/Home Management   Self-Care/Home Mgmt/ADL, Compensatory, Meal Prep Minutes (62303) 24   Symptoms Noted During/After Treatment (Meal Preparation/Planning Training) increased pain   Treatment Detail/Skilled Intervention Pt greeted seated in chair and agreeable to therapy session, pt does endorse increased pain with mvoements. MD and RN present initially. Pt able to STS with CGA nad FWW, cues for body mechanics. Pt able to recall and follow posterior hip precautions throughout session. PT ambualted functinoal hosuehodl dsitnace within room and int oabthroom. Educated on toilet and shower tx within precautions, able to complete shower tx with CGA and cueing for hand rail and body mechanics. Progressing to SBA, educated on having support at home and educated on AE/DME  including shower chair. Pt receptive to education. Demonstrated ability to compelte STS with toilet tx with grab bar. Returned to chair and educated on LB dressing with aE/DME, particiapted in FB dressing with set up assist progressing to mod I. All needs and concerns met and answered.   OT Discharge Planning   OT Plan D/C   OT Discharge Recommendation (DC Rec) other (see comments)  (defer to ortho MD.)   OT Rationale for DC Rec Defer to ortho   OT Brief overview of current status Mod I dressing, toielting, ADLs   Total Session Time   Timed Code Treatment Minutes 24   Total Session Time (sum of timed and untimed services) 29    [facility]  OUTPATIENT OCCUPATIONAL THERAPY  EVALUATION  PLAN OF TREATMENT FOR OUTPATIENT REHABILITATION  (COMPLETE FOR INITIAL CLAIMS ONLY)  Patient's Last Name, First Name, M.I.  YOB: 1940  AristeoGinger stinson                          Provider's Name  [facility] Medical Record No.  [id number]                             Onset Date:  12/13/24   Start of Care Date:  12/13/24   Type:     ___PT   _X_OT   ___SLP Medical Diagnosis:  s/p WALTER                    OT Diagnosis:  Impaired ADLs Visits from SOC:  1     See note for plan of treatment, functional goals and certification details    I CERTIFY THE NEED FOR THESE SERVICES FURNISHED UNDER        THIS PLAN OF TREATMENT AND WHILE UNDER MY CARE     (Physician co-signature of this document indicates review and certification of the therapy plan).

## 2024-12-14 NOTE — PLAN OF CARE
" Goal Outcome Evaluation:      Plan of Care Reviewed With: patient    Overall Patient Progress: improvingOverall Patient Progress: improving    Outcome Evaluation: A&O x4, weaned to 1 LPM NC, capno on, pain mngd w/ oxycodone & tylenol, voiding ok via bedside commode, Ax2 GB & walker, sat at edge of bed & pivot, LR infusing 50 mL/hr, small amount of sanguinous drainage- dressing marked, CMS intact, PT to see at 0815    Problem: Adult Inpatient Plan of Care  Goal: Plan of Care Review  Description: The Plan of Care Review/Shift note should be completed every shift.  The Outcome Evaluation is a brief statement about your assessment that the patient is improving, declining, or no change.  This information will be displayed automatically on your shift  note.  Outcome: Progressing  Flowsheets (Taken 12/13/2024 2202)  Outcome Evaluation: A&OP x4, weaned to 1 LPM NC, pain mngd w/ oxycodone & tylenol, voiding ok via bedside commode, Ax2 GB & walker, sat at edge of bed & pivot, LR infusing 50 mL/hr, small amount of sanguinous drainage- dressing marked, CMS intact, PT to see at 0845  Plan of Care Reviewed With: patient  Overall Patient Progress: improving  Goal: Patient-Specific Goal (Individualized)  Description: You can add care plan individualizations to a care plan. Examples of Individualization might be:  \"Parent requests to be called daily at 9am for status\", \"I have a hard time hearing out of my right ear\", or \"Do not touch me to wake me up as it startles  me\".  Outcome: Progressing  Goal: Absence of Hospital-Acquired Illness or Injury  Outcome: Progressing  Intervention: Identify and Manage Fall Risk  Recent Flowsheet Documentation  Taken 12/13/2024 1700 by Lidia Perry, RN  Safety Promotion/Fall Prevention: safety round/check completed  Intervention: Prevent Skin Injury  Recent Flowsheet Documentation  Taken 12/13/2024 1700 by Lidia Perry, RN  Body Position: supine, head elevated  Intervention: Prevent " Infection  Recent Flowsheet Documentation  Taken 12/13/2024 1700 by Lidia Perry RN  Infection Prevention: hand hygiene promoted  Goal: Optimal Comfort and Wellbeing  Outcome: Progressing  Intervention: Monitor Pain and Promote Comfort  Recent Flowsheet Documentation  Taken 12/13/2024 2119 by Lidia Perry RN  Pain Management Interventions:   medication (see MAR)   cold applied   pillow support provided  Taken 12/13/2024 2013 by Lidia Perry RN  Pain Management Interventions:   medication offered but refused   cold applied   repositioned   pillow support provided  Taken 12/13/2024 1821 by Lidia Perry RN  Pain Management Interventions: cold applied  Taken 12/13/2024 1734 by Lidia Perry RN  Pain Management Interventions: medication (see MAR)  Taken 12/13/2024 1707 by Lidia Perry RN  Pain Management Interventions:   medication (see MAR)   cold applied  Goal: Readiness for Transition of Care  Outcome: Progressing  Intervention: Mutually Develop Transition Plan  Recent Flowsheet Documentation  Taken 12/13/2024 1700 by Lidia Perry RN  Equipment Currently Used at Home:   walker, rolling   cane, straight   grab bar, toilet   grab bar, tub/shower     Problem: Hip Arthroplasty  Goal: Optimal Coping  Outcome: Progressing  Goal: Absence of Bleeding  Outcome: Progressing  Goal: Effective Bowel Elimination  Outcome: Progressing  Goal: Fluid and Electrolyte Balance  Outcome: Progressing  Goal: Optimal Functional Ability  Outcome: Progressing  Intervention: Promote Optimal Functional Status  Recent Flowsheet Documentation  Taken 12/13/2024 2006 by Lidia Perry RN  Assistive Device Utilized:   gait belt   walker  Activity Management: up to bedside commode  Taken 12/13/2024 1700 by Lidia Perry RN  Assistive Device Utilized:   gait belt   walker  Activity Management: activity adjusted per tolerance  Goal: Absence of Infection Signs and Symptoms  Outcome:  Progressing  Goal: Intact Neurovascular Status  Outcome: Progressing  Goal: Anesthesia/Sedation Recovery  Outcome: Progressing  Intervention: Optimize Anesthesia Recovery  Recent Flowsheet Documentation  Taken 12/13/2024 1700 by Lidia ePrry RN  Safety Promotion/Fall Prevention: safety round/check completed  Administration (IS):   mouthpiece utilized   proper technique demonstrated  Level Incentive Spirometer (mL): 1700  Number of Repetitions (IS): 5  Patient Tolerance (IS): good  Goal: Optimal Pain Control and Function  Outcome: Progressing  Intervention: Prevent or Manage Pain  Recent Flowsheet Documentation  Taken 12/13/2024 2119 by Lidia Perry RN  Pain Management Interventions:   medication (see MAR)   cold applied   pillow support provided  Taken 12/13/2024 2013 by Lidia Perry RN  Pain Management Interventions:   medication offered but refused   cold applied   repositioned   pillow support provided  Taken 12/13/2024 1821 by Lidia Perry RN  Pain Management Interventions: cold applied  Taken 12/13/2024 1734 by Lidia Perry RN  Pain Management Interventions: medication (see MAR)  Taken 12/13/2024 1707 by Lidia Perry RN  Pain Management Interventions:   medication (see MAR)   cold applied  Goal: Nausea and Vomiting Relief  Outcome: Progressing  Goal: Effective Urinary Elimination  Outcome: Progressing  Goal: Effective Oxygenation and Ventilation  Outcome: Progressing  Intervention: Optimize Oxygenation and Ventilation  Recent Flowsheet Documentation  Taken 12/13/2024 2006 by Lidia Perry RN  Activity Management: up to bedside commode  Taken 12/13/2024 1700 by Lidia Perry RN  Cough And Deep Breathing: done independently per patient  Activity Management: activity adjusted per tolerance  Head of Bed (HOB) Positioning: HOB at 30-45 degrees

## 2024-12-14 NOTE — PROGRESS NOTES
12/14/24 0901   Appointment Info   Signing Clinician's Name / Credentials (PT) MEERA Hill   Student Supervision Direct supervision provided;Direct Patient Contact Provided;Therapy services provided with the co-signing licensed therapist guiding and directing the services, and providing the skilled judgement and assessment throughout the session   Quick Adds   Quick Adds [facility] Auth & Certification   Living Environment   People in Home alone   Current Living Arrangements apartment   Home Accessibility no concerns   Number of Stairs, Within Home, Primary none   Transportation Anticipated family or friend will provide   Living Environment Comments Pt lives alone in an apartment, daughter will be staying with her for the next couple days to assist and lives nearby.   Self-Care   Usual Activity Tolerance moderate   Current Activity Tolerance fair   Regular Exercise Yes   Activity/Exercise Type walking   Exercise Amount/Frequency 10 mins   Equipment Currently Used at Home walker, rolling;walker, standard;cane, straight   Fall history within last six months no   Activity/Exercise/Self-Care Comment Pt uses 4WW at baseline for mobility, can tolerate amb for running one errand at a time.   General Information   Onset of Illness/Injury or Date of Surgery 12/13/24   Referring Physician Eugenio Jeffries MD   Patient/Family Therapy Goals Statement (PT) DC Home   Pertinent History of Current Problem (include personal factors and/or comorbidities that impact the POC) POD#1 L WALTER   Existing Precautions/Restrictions fall;no hip IR;no hip ER;no hip ADD past midline;90 degree hip flexion;no pivoting or twisting   Cognition   Affect/Mental Status (Cognition) WNL   Orientation Status (Cognition) oriented x 4   Pain Assessment   Patient Currently in Pain Yes, see Vital Sign flowsheet   Integumentary/Edema   Integumentary/Edema Comments see nursing notes   Posture    Posture Forward head position;Protracted shoulders   Range of  Motion (ROM)   Range of Motion ROM deficits secondary to surgical procedure;ROM deficits secondary to pain;ROM deficits secondary to swelling   Strength (Manual Muscle Testing)   Strength (Manual Muscle Testing) Deficits observed during functional mobility   Bed Mobility   Bed Mobility supine-sit;sit-supine   Supine-Sit [place] (Bed Mobility) minimum assist (75% patient effort)   Sit-Supine [place] (Bed Mobility) minimum assist (75% patient effort)   Comment, (Bed Mobility) Difficulty moving L LE in/out of bed.   Transfers   Transfers sit-stand transfer   Sit-Stand Transfer   Sit-Stand [place] (Transfers) supervision   Assistive Device (Sit-Stand Transfers) walker, front-wheeled   Comment, (Sit-Stand Transfer) Uses UEs safely for pushoff, slow to come to standing/RTS   Gait/Stairs (Locomotion)   [place] Level (Gait) contact guard;supervision   Assistive Device (Gait) walker, front-wheeled   Distance in Feet (Gait) 10 (eval) + 100 (treat)   Pattern (Gait) step-to   Deviations/Abnormal Patterns (Gait) lisseth decreased;gait speed decreased;antalgic   Balance   Balance Comments Pt with no loss of balance during PT session with use of 2WW for all mobility. At risk for fall secondary to pain and decreased gait speed.   Sensory Examination   Sensory Perception patient reports no sensory changes   Coordination   Coordination no deficits were identified   Muscle Tone   Muscle Tone no deficits were identified   Clinical Impression   Criteria for Skilled Therapeutic Intervention Yes, treatment indicated   PT Diagnosis (PT) impaired functional mobility   Influenced by the following impairments pain, decreased strength, decreased activity tolerance, impaired balance   Functional limitations due to impairments gait, transfers, bed mobility   Clinical Presentation (PT Evaluation Complexity) stable   Clinical Presentation Rationale clinical judgement   Clinical Decision Making (Complexity) low complexity    Planned Therapy Interventions (PT) balance training;bed mobility training;gait training;home exercise program;patient/family education;strengthening;transfer training;progressive activity/exercise   Risk & Benefits of therapy have been explained evaluation/treatment results reviewed;care plan/treatment goals reviewed;risks/benefits reviewed;current/potential barriers reviewed;participants voiced agreement with care plan;participants included;patient;daughter   PT Total Evaluation Time   PT Eval, Low Complexity Minutes (30514) 5   Therapy Certification   Start of care date 12/14/24   Certification date from 12/14/24   Certification date to 12/14/24   Medical Diagnosis Left WALTER   [facility] Authorization Information   Condition type Initial onset (within last 3 months)   Cause of current episode Post Surgical   Nature of treatment Rehabilitative   Functional ability Moderate functional limitations   Documented POC (choose all that apply) Measurable short and long term/discharge treatment goals related to physical and functional deficits.;Frequency of treatment visits and treatment activities to address deficit areas.;Patient agrees to program participation including home program   Briefly describe symptoms pain, decreased strength, decreased ROM   How did the symptoms start post surgical   Average pain/intensity last 24 hours 5/10   Average pain/intensity past week 5/10   Frequency of Symptoms Constantly (% of the time)   Symptom impact on ADLs Moderately   Condition change since eval N/A (first visit)   General health reported by patient Good   Type of surgery 5-Joint Replacement   Physical Therapy Goals   PT Frequency Daily   PT Predicted Duration/Target Date for Goal Attainment 12/14/24   PT Goals Bed Mobility;Transfers;Gait   PT: Bed Mobility Minimal assist;Goal Met   PT: Transfers Supervision/stand-by assist;Goal Met;Assistive device   PT: Gait Supervision/stand-by assist;Standard walker;150 feet;Goal Met    Interventions   Interventions Quick Adds Gait Training;Therapeutic Activity;Therapeutic Procedure   Therapeutic Procedure/Exercise   Ther. Procedure: strength, endurance, ROM, flexibillity Minutes (80475) 10   Symptoms Noted During/After Treatment fatigue;increased pain   Treatment Detail/Skilled Intervention Facilitated HEP completion. Pt completed x5 ankle pumps, LAQ, heel slides, SAQ, quad sets. Edu for incorporating SLR in next few days. Cues for breathing and technique throughout. Handout issued.   Therapeutic Activity   Therapeutic Activities: dynamic activities to improve functional performance Minutes (96160) 10   Symptoms Noted During/After Treatment Increased pain;Fatigue   Treatment Detail/Skilled Intervention Pt seated in recliner at start of session. Edu on posterior hip precautions, handout issued. Facilitated STS with 2WW/CGA. Pt RTS at EOB with CGA. Facilitated sit>supine and then supine>sit with Tony at L LE and elevated HOB to match pt's home setup. Cued for STS with 2WW/CGA and RTS in recliner chair with SBA. Pt edu for car transfer. Pt seated in recliner, alarm on, needs within reach at end of session.   Gait Training   Gait Training Minutes (37998) 10   Symptoms Noted During/After Treatment (Gait Training) fatigue;increased pain   Treatment Detail/Skilled Intervention Facilitated amb x100ft with 2WW and graded assist CGA to SBA. Pt demonstrating step to gait pattern, decreased gait speed, reliance on UEs, and fair foot clearance. Took 2 standing rest breaks. Reporting pain in L hip with amb. Edu for incorporating walks around apartment over the coming weeks.   Distance in Feet 100   PT Discharge Planning   PT Plan DC   PT Discharge Recommendation (DC Rec)   (per ortho MD)   PT Rationale for DC Rec Pt is below baseline for functional mobility. She demonstrated safety with transfers and household distance ambulation with 2WW and SBA this date. She is safe to return home with assist from  daughter. Reports she has OP PT scheduled.   PT Brief overview of current status SBA with 2WW   Physical Therapy Time and Intention   Timed Code Treatment Minutes 30   Total Session Time (sum of timed and untimed services) 35     [facility]  OUTPATIENT PHYSICAL THERAPY EVALUATION  PLAN OF TREATMENT FOR OUTPATIENT REHABILITATION  (COMPLETE FOR INITIAL CLAIMS ONLY)  Patient's Last Name, First Name, M.I.  YOB: 1940  Ginger Marshall                        Provider's Name  [facility] Medical Record No.  [id number]                             Onset Date:  (P) 12/13/24   Start of Care Date:  (P) 12/14/24   Type:     _X_PT   ___OT   ___SLP Medical Diagnosis:  (P) Left WALTER              PT Diagnosis:  (P) impaired functional mobility Visits from SOC:  1     See note for plan of treatment, functional goals and certification details    I CERTIFY THE NEED FOR THESE SERVICES FURNISHED UNDER        THIS PLAN OF TREATMENT AND WHILE UNDER MY CARE     (Physician co-signature of this document indicates review and certification of the therapy plan).

## 2024-12-14 NOTE — PLAN OF CARE
Occupational Therapy Discharge Summary    Reason for therapy discharge:    All goals and outcomes met, no further needs identified.    Progress towards therapy goal(s). See goals on Care Plan in [facility] electronic health record for goal details.  Goals met    Therapy recommendation(s):    Defer to ortho MD.

## 2024-12-14 NOTE — PLAN OF CARE
Physical Therapy Discharge Summary    Reason for therapy discharge:    All goals and outcomes met, no further needs identified.    Progress towards therapy goal(s). See goals on Care Plan [facility] electronic health record for goal details.  Goals met    Therapy recommendation(s):    Recommend use of 2WW for all mobility and transfers. Recommend supervision from daughter for the next 2 days at home. Pt reports she will be going to OP PT in January.

## 2024-12-14 NOTE — DISCHARGE SUMMARY
ORTHOPAEDIC DISCHARGE SUMMARY     Date of Admission: 12/13/2024  Date of Discharge: 12/14/2024  Disposition: Home  Staff Physician: Eugenio Jeffries MD  Primary Care Provider: Duy Leyva    DISCHARGE DIAGNOSIS:   84-year-old female presenting with chronic left hip/groin pain due to osteoarthritic changes and in setting of rheumatoid arthritis which are insufficiently responding to nonsurgical treatment options.     PROCEDURES: Procedure(s):  Left hybrid total hip arthroplasty on 12/13/2024    BRIEF HISTORY:   84-year-old female presenting with chronic left hip/groin pain due to osteoarthritic changes and in setting of rheumatoid arthritis which are insufficiently responding to nonsurgical treatment options.     HOSPITAL COURSE:    Surgery was without unexpected event. Ginger Marshall has done well post-operatively. Medicine was consulted post operatively to aid in management of medical comorbidities.  The patient received routine nursing cares and has remained medically stable. Vital signs have remained stable throughout the hospital stay. The patient is tolerating a regular diet without GI distress/nausea or vomiting. Voiding spontaneously in the post-operative period. PT & OT were consulted for evaluation, and all PT/OT goals have been met for safe mobility. The patient's post-operative pain is now controlled on oral medications, which will be available on discharge. Stool softeners have been used while taking pain medications to help prevent constipation. Ginger Marshall is deemed medically safe to discharge.     Antibiotics:  Ancef given periop and 24 hours postop.   DVT prophylaxis:  Aspirin 162 mg daily for 4 weeks  PT Progress:  Has met PT/OT goals for safe mobility.    Pain Meds:  Weaned off all IV pain meds by discharge.  Inpatient Events: No significant events or complications.     Discharge orders and instructions as below.    FOLLOWUP:    Follow up with Dr. Jeffries at 2 and 6 weeks  postoperatively.  Future Appointments   Date Time Provider Department Center   12/14/2024  9:45 AM Renetta Stephens, OTR [facility]   12/20/2024 10:40 AM Nico Byers MD [facility]   12/23/2024 10:20 AM Marie Salazar, PT [facility]   12/27/2024 11:40 AM Eugenio Jeffries MD [facility]   12/30/2024 10:10 AM Marie Salazar, PT [facility]   1/6/2025 10:10 AM Alexandr Dempsey, PT [facility]   1/13/2025 10:10 AM Marie Salazar, PT [facility]   1/16/2025  9:00 AM Rocío Arenas LIC[facility]   1/20/2025 10:10 AM Marie Salazar, PT [facility]   1/24/2025  3:30 PM Cody Capellan, [facility]   3/7/2025 10:30 AM [facility]   3/20/2025  1:15 PM Provider,  [facility]   3/20/2025  1:15 PM Kathe Vázquez MD [facility] [place] ([address] [place] [address]). Call [phone] to schedule a follow-up appointment at this location with your provider if you have not heard confirmation of your appointment in the next 3-5 business days    PLANNED DISCHARGE ORDERS:           Current Discharge Medication List        START taking these medications    Details   aspirin (ASA) 325 MG EC tablet Take 1 tablet (325 mg) by mouth daily.  Qty: 30 tablet, Refills: 0    Associated Diagnoses: Osteoarthritis of one hip, left      oxyCODONE (ROXICODONE) 5 MG tablet Take 1-2 tablets (5-10 mg) by mouth every 4 hours as needed for moderate to severe pain.  Qty: 26 tablet, Refills: 0    Associated Diagnoses: Osteoarthritis of one hip, left      polyethylene glycol (MIRALAX) 17 g packet Take 17 g by mouth daily.  Qty: 7 packet, Refills: 0    Associated Diagnoses: Osteoarthritis of one hip, left      senna-docusate (SENOKOT-S/PERICOLACE) 8.6-50 MG tablet Take 1-2 tablets by mouth 2 times daily. Take while on oral narcotics to prevent or treat  constipation.  Qty: 30 tablet, Refills: 0    Comments: While taking narcotics  Associated Diagnoses: Osteoarthritis of one hip, left           CONTINUE these medications which have CHANGED    Details   acetaminophen (TYLENOL) 325 MG tablet Take 2 tablets (650 mg) by mouth every 4 hours as needed for other (mild pain).  Qty: 100 tablet, Refills: 0    Associated Diagnoses: Osteoarthritis of one hip, left      upadacitinib ER (RINVOQ) 15 MG tablet Take 1 tablet (15 mg) by mouth daily. . Hold for any infection and seek medical attention.  Qty: 30 tablet, Refills: 9    Comments: Hold until cleared by surgery to resume (typically holding for 14 days after surgery assuming all is going well).  Associated Diagnoses: Rheumatoid arthritis involving multiple sites with positive rheumatoid factor (H)           CONTINUE these medications which have NOT CHANGED    Details   albuterol (PROAIR HFA/PROVENTIL HFA/VENTOLIN HFA) 108 (90 Base) MCG/ACT inhaler Inhale 2 puffs into the lungs every 6 hours as needed for shortness of breath, wheezing or cough    Comments: Pharmacy may dispense brand covered by insurance (Proair, or proventil or ventolin or generic albuterol inhaler)  Associated Diagnoses: SOB (shortness of breath); Fever, unspecified fever cause; Acute cough      atorvastatin (LIPITOR) 40 MG tablet Take 1 tablet (40 mg) by mouth daily  Qty: 90 tablet, Refills: 3    Associated Diagnoses: Hyperlipidemia LDL goal <100      buPROPion (WELLBUTRIN XL) 150 MG 24 hr tablet TAKE ONE TABLET BY MOUTH EVERY MORNING  Qty: 30 tablet, Refills: 0    Associated Diagnoses: Major depressive disorder, recurrent episode, in full remission (H)      CALCIUM CITRATE PO Take 300 mg by mouth daily Take with meal that contains least amount of calcium      carboxymethylcellulose PF (REFRESH PLUS) 0.5 % ophthalmic solution Place 1 drop into both eyes 2 times daily as needed for dry eyes    Associated Diagnoses: Rheumatoid arthritis involving right hand  with positive rheumatoid factor (H)      Cholecalciferol (VITAMIN D-3 PO) Take 1,000 Units by mouth daily.      Ferrous Gluconate 324 (37.5 Fe) MG TABS Take 1 tablet by mouth daily Started 6/2023      ipratropium (ATROVENT) 0.06 % nasal spray Spray 2 sprays into both nostrils 4 times daily as needed for rhinitis (nasal drainage)  Qty: 15 mL, Refills: 11    Associated Diagnoses: Nasal sore; Vasomotor rhinitis      leucovorin (WELLCOVORIN) 5 MG tablet Take 1 tablet (5 mg) by mouth every 7 days . Take 24 hours after taking Methotrexate each week.  Qty: 13 tablet, Refills: 2    Associated Diagnoses: Rheumatoid arthritis involving multiple sites with positive rheumatoid factor (H)      loratadine (CLARITIN) 10 MG tablet Take 1 tablet (10 mg) by mouth 2 times daily    Associated Diagnoses: Allergy, subsequent encounter      Methotrexate, PF, (RASUVO) 25 MG/0.5ML autoinjector Inject 0.5 mLs (25 mg) subcutaneously every 7 days. . Hold for signs of infection, and seek medical attention. Take every Thursday.  Qty: 2 mL, Refills: 9    Associated Diagnoses: Rheumatoid arthritis involving multiple sites with positive rheumatoid factor (H)      olopatadine (PATADAY) 0.2 % ophthalmic solution Place 1 drop into both eyes daily as needed Uses mostly in Spring      pantoprazole (PROTONIX) 40 MG EC tablet TAKE ONE TABLET BY MOUTH EVERY MORNING BEFORE BREAKFAST  Qty: 90 tablet, Refills: 0    Associated Diagnoses: Gastroesophageal reflux disease, unspecified whether esophagitis present      pregabalin (LYRICA) 50 MG capsule Take 1 capsule (50 mg) by mouth 3 times daily  Qty: 270 capsule, Refills: 1    Associated Diagnoses: Chronic midline low back pain with left-sided sciatica      sertraline (ZOLOFT) 100 MG tablet TAKE ONE TABLET BY MOUTH ONCE DAILY  Qty: 90 tablet, Refills: 1    Associated Diagnoses: Moderate episode of recurrent major depressive disorder (H); Anxiety      valACYclovir (VALTREX) 500 MG tablet TAKE ONE TABLET BY MOUTH  "TWICE A DAY  Qty: 180 tablet, Refills: 0    Associated Diagnoses: Herpes simplex vulvovaginitis      vitamin C (ASCORBIC ACID) 1000 MG TABS Take 1 tablet (1,000 mg) by mouth daily    Associated Diagnoses: Rheumatoid arthritis involving right hand with positive rheumatoid factor (H)      zoledronic Acid (RECLAST) 5 MG/100ML SOLN infusion Inject 5 mg into the vein once Every year (next dose March 2023)      UNABLE TO FIND MEDICATION NAME: Distilled water for CPAP           STOP taking these medications       HYDROcodone-acetaminophen (NORCO) 5-325 MG tablet Comments:   Reason for Stopping:                 Discharge Procedure Orders   Reason for your hospital stay   Order Comments: Left WALTER     When to call - Contact Surgeon Team   Order Comments: You may experience symptoms that require follow-up before your scheduled appointment. Refer to the \"Stoplight Tool\" for instructions on when to contact your Surgeon Team if you are concerned about pain control, blood clots, constipation, or if you are unable to urinate.     When to call - Reach out to Urgent Care   Order Comments: If you are not able to reach your Surgeon Team and you need immediate care, go to the Orthopedic Walk-in Clinic or Urgent Care at your Surgeon's office.  Do NOT go to the Emergency Room unless you have shortness of breath, chest pain, or other signs of a medical emergency.     When to call - Reasons to Call 911   Order Comments: Call 911 immediately if you experience sudden-onset chest pain, arm weakness/numbness, slurred speech, or shortness of breath     Discharge Instruction - Breathing exercises   Order Comments: Perform breathing exercises using your Incentive Spirometer 10 times per hour while awake for 2 weeks.     Symptoms - Fever Management   Order Comments: A low grade fever can be expected after surgery.  Use acetaminophen (TYLENOL) as needed for fever management.  Contact your Surgeon Team if you have a fever greater than 101.5 F, " chills, and/or night sweats.     Symptoms - Constipation management   Order Comments: Constipation (hard, dry bowel movements) is expected after surgery due to the combination of being less active, the anesthetic, and the opioid pain medication.  You can do the following to help reduce constipation:  ~  FLUIDS:  Drink clear liquids (water or Gatorade), or juice (apple/prune).  ~  DIET:  Eat a fiber rich diet.    ~  ACTIVITY:  Get up and move around several times a day.  Increase your activity as you are able.  MEDICATIONS:  Reduce the risk of constipation by starting medications before you are constipated.  You can take Miralax   (1 packet as directed) and/or a stool softener (Senokot 1-2 tablets 1-2 times a day).  If you already have constipation and these medications are not working, you can get magnesium citrate and use as directed.  If you continue to have constipation you can try an over the counter suppository or enema.  Call your Surgeon Team if it has been greater than 3 days since your last bowel movement.     Symptoms - Reduced Urine Output   Order Comments: Changes in the amount of fluids you drank before and after surgery may result in problems urinating.  It is important to stay well-hydrated after surgery and drink plenty of water. If it has been greater than 8 hours since you have urinated despite drinking plenty of water, call your Surgeon Team.     Activity - Exercises to prevent blood clots   Order Comments: Unless otherwise directed by your Surgeon team, perform the following exercises at least three times per day for the first four weeks after surgery to prevent blood clots in your legs: 1) Point and flex your feet (Ankle Pumps), 2) Move your ankle around in big circles, 3) Wiggle your toes, 4) Walk, even for short distances, several times a day, will help decrease the risk of blood clots.     Order Specific Question Answer Comments   Is discharge order? Yes      Comfort and Pain Management -  Pain after Surgery   Order Comments: Pain after surgery is normal and expected.  You will have some amount of pain for several weeks after surgery.  Your pain will improve with time.  There are several things you can do to help reduce your pain including: rest, ice, elevation, and using pain medications as needed. Contact your Surgeon Team if you have pain that persists or worsens after surgery despite rest, ice, elevation, and taking your medication(s) as prescribed. Contact your Surgeon Team if you have new numbness, tingling, or weakness in your operative extremity.     Comfort and Pain Management - Swelling after Surgery   Order Comments: Swelling and/or bruising of the surgical extremity is common and may persist for several months after surgery. In addition to frequent icing and elevation, gentle compressive support with an ACE wrap or tubigrip may help with swelling. Apply compression regularly, removing at least twice daily to perform skin checks. Contact your Surgeon Team if your swelling increases and is NOT associated with an increase in your activity level, or if your swelling increases and is associated with redness and pain.     Comfort and Pain Management - Cold therapy   Order Comments: Ice can be used to control swelling and discomfort after surgery. Place a thin towel over your operative site and apply the ice pack overtop. Leave ice pack in place for 20 minutes, then remove for 20 minutes. Repeat this 20 minutes on/20 minutes off routine as often as tolerated.     Medication Instructions - Acetaminophen (TYLENOL) Instructions   Order Comments: You were discharged with acetaminophen (TYLENOL) for pain management after surgery. Acetaminophen most effectively manages pain symptoms when it is taken on a schedule without missing doses (every four, six, or eight hours). Your Provider will prescribe a safe daily dose between 3000 - 4000 mg.  Do NOT exceed this daily dose. Most patients use acetaminophen  "for pain control for the first four weeks after surgery.  You can wean from this medication as your pain decreases.     Follow Up Care   Order Comments: Follow-up with your Surgeon Team in 2 weeks for wound check.     Activity - Total Hip Arthroplasty   Order Comments: Refer to the [facility] \"Your Guide to Total Joint Replacement\" for recommendations on activities and Exercises.     Order Specific Question Answer Comments   Is discharge order? Yes      Return to Driving   Order Comments: Return to driving - Driving is NOT permitted until directed by your provider. Under no circumstance are you permitted to drive while using narcotic pain medications.     Order Specific Question Answer Comments   Is discharge order? Yes      No flexion past 90 degrees   Order Comments: No bending at waist past 90 degrees.     Order Specific Question Answer Comments   Is discharge order? Yes      No internal rotation   Order Comments: No pivoting on your operative leg.     Order Specific Question Answer Comments   Is discharge order? Yes      No adduction past midline   Order Comments: No crossing your operative leg.     Order Specific Question Answer Comments   Is discharge order? Yes      Weight bearing as tolerated   Order Comments: Weight bearing as tolerated on your operative extremity.     Order Specific Question Answer Comments   Is discharge order? Yes      Dressing / Wound Care - Wound   Order Comments: You have a clean dressing on your surgical wound. Dressing change instructions as follows: remove your dressing in 7-10 days, and leave incision open to air. Contact your Surgeon Team if you have increased redness, warmth around the surgical wound, and/or drainage from the surgical wound.     Dressing / Wound care - Shower with wound/dressing covered   Order Comments: You must COVER your dressing or incision with saran wrap (or any other non-permeable covering) to allow the incision to remain dry while showering.  You " "may shower 2-3 days after surgery as long as the surgical wound stays dry. Continue to cover your dressing or incision for showering until your first office visit.  You are strictly prohibited from soaking   or submerging the surgical wound underwater.     Dressing / Wound Care - NO Tub Bathing   Order Comments: Tub bathing, swimming, or any other activities that will cause your incision to be submerged in water should be avoided until allowed by your Surgeon.     Medication instructions -  Anticoagulation - aspirin   Order Comments: Take the aspirin as prescribed for a total of four weeks after surgery.  This is given to help minimize your risk of blood clot.     Comfort and Pain Management - LOWER Extremity Elevation   Order Comments: Swelling is expected for several months after surgery. This type of swelling is usually associated with gravity and activity, and can be improved with elevation.   The best way to do this is to get your \"toes above your nose\" by laying down and placing several pillows lengthwise under your calf and heel. When elevating your leg keep your knee completely straight. Perform this elevation as often as possible especially for the first two weeks after surgery.     Crutches DME   Order Comments: DME Documentation: Describe the reason for need to support medical necessity: Impaired gait status post hip surgery. I, the undersigned, certify that the above prescribed supplies are medically necessary for this patient and is both reasonable and necessary in reference to accepted standards of medical practice in the treatment of this patient's condition and is not prescribed as a convenience.     Order Specific Question Answer Comments   Medical Equipment (DME) Supplier: [employer]    PATIENT INSTRUCTIONS: If you did not receive this ordered item today, please contact [employer] for availability ([place] Locations: [phone], [place]: [phone]).  "   Crutch Type: Standard    Crutches Add On: NA    Length of Need: Lifetime      Cane DME   Order Comments: DME Documentation: Describe the reason for need to support medical necessity: Impaired gait status post hip surgery. I, the undersigned, certify that the above prescribed supplies are medically necessary for this patient and is both reasonable and necessary in reference to accepted standards of medical practice in the treatment of this patient's condition and is not prescribed as a convenience.     Order Specific Question Answer Comments   Medical Equipment (DME) Supplier: [employer]    PATIENT INSTRUCTIONS: If you did not receive this ordered item today, please contact [employer] for availability ([place] Locations: [phone], [place]: [phone]).    Cane Type: Single Tip    Reminder: Patient can typically get 1 every 5 years      Walker DME   Order Comments: DME Documentation: Describe the reason for need to support medical necessity: Impaired gait status post hip surgery. I, the undersigned, certify that the above prescribed supplies are medically necessary for this patient and is both reasonable and necessary in reference to accepted standards of medical practice in the treatment of this patient's condition and is not prescribed as a convenience.     Order Specific Question Answer Comments   Medical Equipment (DME) Supplier: [employer]    PATIENT INSTRUCTIONS: If you did not receive this ordered item today, please contact [employer] for availability ([place] Locations: [phone], [place]: [phone]).    Walker Type: Standard (2 Wheel)    Accessories: N/A      Discharge Instruction - Regular Diet Adult   Order Comments: Return to your pre-surgery diet unless instructed otherwise     Order Specific Question Answer Comments   Is discharge order? Yes          Eugenio Jeffries MD, PhD     Adult  Reconstruction  [facility] Department of Orthopaedic Surgery  Pager [phone]

## 2024-12-14 NOTE — PLAN OF CARE
"Goal Outcome Evaluation:      Plan of Care Reviewed With: patient    Overall Patient Progress: improvingOverall Patient Progress: improving         Patient vital signs are at baseline: Yes  Patient able to ambulate as they were prior to admission or with assist devices provided by therapies during their stay:  No,  Reason:  A1 wgb has ambulated to the bathroom this AM  Patient MUST void prior to discharge:  Yes  Patient able to tolerate oral intake:  Yes  Pain has adequate pain control using Oral analgesics:  Yes  Does patient have an identified :  Yes  Has goal D/C date and time been discussed with patient:  Yes    Calm and cooperative. Ancef infused. Minimal pain overnight. No prns needed. Has refused oxycodone          Problem: Adult Inpatient Plan of Care  Goal: Plan of Care Review  Description: The Plan of Care Review/Shift note should be completed every shift.  The Outcome Evaluation is a brief statement about your assessment that the patient is improving, declining, or no change.  This information will be displayed automatically on your shift  note.  Outcome: Progressing  Flowsheets (Taken 12/14/2024 0323)  Plan of Care Reviewed With: patient  Overall Patient Progress: improving  Goal: Patient-Specific Goal (Individualized)  Description: You can add care plan individualizations to a care plan. Examples of Individualization might be:  \"Parent requests to be called daily at 9am for status\", \"I have a hard time hearing out of my right ear\", or \"Do not touch me to wake me up as it startles  me\".  Outcome: Progressing  Goal: Absence of Hospital-Acquired Illness or Injury  Outcome: Progressing  Intervention: Identify and Manage Fall Risk  Recent Flowsheet Documentation  Taken 12/14/2024 0026 by Kedar Morales RN  Safety Promotion/Fall Prevention: safety round/check completed  Intervention: Prevent Skin Injury  Recent Flowsheet Documentation  Taken 12/14/2024 0026 by Kedar Morales, RN  Body Position: " position changed independently  Intervention: Prevent and Manage VTE (Venous Thromboembolism) Risk  Recent Flowsheet Documentation  Taken 12/14/2024 0026 by Kedar Morales RN  VTE Prevention/Management: SCDs on (sequential compression devices)  Intervention: Prevent Infection  Recent Flowsheet Documentation  Taken 12/14/2024 0026 by Kedar Morales RN  Infection Prevention: hand hygiene promoted  Goal: Optimal Comfort and Wellbeing  Outcome: Progressing  Intervention: Monitor Pain and Promote Comfort  Recent Flowsheet Documentation  Taken 12/14/2024 0026 by Kedar Morales RN  Pain Management Interventions: medication (see MAR)  Goal: Readiness for Transition of Care  Outcome: Progressing

## 2024-12-16 ENCOUNTER — TELEPHONE (OUTPATIENT)
Dept: OTHER | Age: 84
End: 2024-12-16

## 2024-12-16 NOTE — TELEPHONE ENCOUNTER
Other: pt of Dr. Jeffries is needing a post op rescheduled to a Monday.  Was told it could be with someone else.  No ride on a Friday.  Reminder:  Ortho con does not schedule post ops.   Also needs extremity evaluation rescheduled.    Could we send this information to you in [facility] or would you prefer to receive a phone call?:   Patient would prefer a phone call   Okay to leave a detailed message?: Yes at Cell number on file:    Telephone Information:   Mobile [phone]

## 2024-12-17 ENCOUNTER — TELEPHONE (OUTPATIENT)
Dept: ORTHOPEDICS | Facility: CLINIC | Age: 84
End: 2024-12-17
Payer: COMMERCIAL

## 2024-12-17 DIAGNOSIS — Z96.642 STATUS POST TOTAL REPLACEMENT OF LEFT HIP: ICD-10-CM

## 2024-12-17 DIAGNOSIS — M16.12 OSTEOARTHRITIS OF ONE HIP, LEFT: ICD-10-CM

## 2024-12-17 RX ORDER — HYDROMORPHONE HYDROCHLORIDE 2 MG/1
2-4 TABLET ORAL EVERY 6 HOURS PRN
Qty: 20 TABLET | Refills: 0 | Status: SHIPPED | OUTPATIENT
Start: 2024-12-17

## 2024-12-17 RX ORDER — AMOXICILLIN 250 MG
1-2 CAPSULE ORAL 2 TIMES DAILY
Qty: 30 TABLET | Refills: 0 | Status: SHIPPED | OUTPATIENT
Start: 2024-12-17

## 2024-12-17 RX ORDER — HYDROMORPHONE HYDROCHLORIDE 2 MG/1
2-4 TABLET ORAL EVERY 6 HOURS PRN
Qty: 10 TABLET | Refills: 0 | Status: CANCELLED | OUTPATIENT
Start: 2024-12-17

## 2024-12-17 NOTE — TELEPHONE ENCOUNTER
PCP out of clinic.    Medication request is being addressed by her surgery team - see med refill request to [facility] (Dr. Jeffries) from today (12/17/24).    Patti Deutsch PA-C

## 2024-12-17 NOTE — TELEPHONE ENCOUNTER
Other: Patient needs to reschedule her postop due to she cannot due Fridays. Please call patient back to reschedule.     Could we send this information to you in [facility] or would you prefer to receive a phone call?:   Patient would prefer a phone call   Okay to leave a detailed message?: Yes at Cell number on file:    Telephone Information:   Mobile [phone]

## 2024-12-17 NOTE — TELEPHONE ENCOUNTER
Left detailed voicemail for patient inquiring if she can come in on 12/30 to the [place] location to see Patsy Rodriguez PA-C, for a 2pm appointment. Call back number left.     If time works for patient, please advise she arrive at 1:45pm and route to the  Ortho Pool so we can properly get this added to the provider schedule.     Jany Alarcon, ATC

## 2024-12-17 NOTE — TELEPHONE ENCOUNTER
Medication Refill Request    Dilaudid  [facility] Liane    Could we send this information to you in [employer] or would you prefer to receive a phone call?:   Patient would prefer a phone call   Okay to leave a detailed message?: No at Cell number on file:    Telephone Information:   Mobile [phone]

## 2024-12-17 NOTE — TELEPHONE ENCOUNTER
[facility] Call Center    Phone Message    May a detailed message be left on voicemail: yes     Reason for Call: Patient missed call from Clinic. She Stated She was told to be Scheduled with Jatin on Dec 23 . Nothing is Avail. Please call Patient . Thanks     Action Taken: Message routed to:  Other: [facility] surgical doctors    Travel Screening: Not Applicable     Date of Service:                                                                      Patent

## 2024-12-17 NOTE — TELEPHONE ENCOUNTER
Phoned patient back regarding medication refill request of Maribeth.    Patient is s/p left hybrid total hip arthroplasty DOS 12/13/24 with Dr. Jeffries.    Patient reports things are going ok despite pain.  She is currently taking 1 tab (2 mg) every 6 hours for pain relief.  Writer advised directions state she may take 1-2 tabs (2-4 mg) every 6 hours prn for pain. Advised to continue post op Tylenol use.  Patient reports she is also running low on Senna-Docusate, a refill will be sent for this as well. Writer reviewed recommendations for RICE.    Medication routed to provider for signature.    Sneha Mack RN on 12/17/2024 at 11:49 AM

## 2024-12-17 NOTE — TELEPHONE ENCOUNTER
Spoke with patient and assisted in r/s her 2 and 6 week post op visits as she does not have transportation on Friday's and requested Monday appointments.     Jany Alarcon, ATC

## 2024-12-17 NOTE — TELEPHONE ENCOUNTER
Provider not in office that day.     Please see telephone encounter dated 12/16/24.     Jany Alarcon ATC

## 2024-12-18 ASSESSMENT — ACTIVITIES OF DAILY LIVING (ADL)
ADL_COUNT: 17
ROLLING_OVER_IN_BED: UNABLE TO DO
SITTING FOR 15 MINUTES: NO DIFFICULTY AT ALL
LOW_IMPACT_ACTIVITIES_LIKE_FAST_WALKING: UNABLE TO DO
RECREATIONAL_ACTIVITIES: UNABLE TO DO
DEEP_SQUATTING: EXTREME DIFFICULTY
CUTTING/LATERAL_MOVEMENTS: MODERATE DIFFICULTY
ADL_HIGHEST_POTENTIAL_SCORE: 68
WALKING_INITIALLY: SLIGHT DIFFICULTY
ROLLING OVER IN BED: UNABLE TO DO
SPORTS_SCORE(%): 0
PLEASE_INDICATE_YOR_PRIMARY_REASON_FOR_REFERRAL_TO_THERAPY:: HIP
SPORTS_COUNT: 9
LIGHT_TO_MODERATE_WORK: MODERATE DIFFICULTY
SPORTS_TOTAL_ITEM_SCORE: 0
LIGHT_TO_MODERATE_WORK: MODERATE DIFFICULTY
TWISTING/PIVOTING ON INVOLVED LEG: MODERATE DIFFICULTY
STEPPING UP AND DOWN CURBS: SLIGHT DIFFICULTY
HOW_WOULD_YOU_RATE_YOUR_CURRENT_LEVEL_OF_FUNCTION_DURING_YOUR_USUAL_ACTIVITIES_OF_DAILY_LIVING_FROM_0_TO_100_WITH_100_BEING_YOUR_LEVEL_OF_FUNCTION_PRIOR_TO_YOUR_HIP_PROBLEM_AND_0_BEING_THE_INABILITY_TO_PERFORM_ANY_OF_YOUR_USUAL_DAILY_ACTIVITIES?: 10
HOW_WOULD_YOU_RATE_YOUR_CURRENT_LEVEL_OF_FUNCTION_DURING_YOUR_SPORTS_RELATED_ACTIVITIES_FROM_0_TO_100_WITH_100_BEING_YOUR_LEVEL_OF_FUNCTION_PRIOR_TO_YOUR_HIP_PROBLEM_AND_0_BEING_THE_INABILITY_TO_PERFORM_ANY_OF_YOUR_USUAL_DAILY_ACTIVITIES?: 0
WALKING_INITIALLY: SLIGHT DIFFICULTY
HEAVY_WORK: UNABLE TO DO
STARTING_AND_STOPPING_QUICKLY: EXTREME DIFFICULTY
HOW_WOULD_YOU_RATE_YOUR_CURRENT_LEVEL_OF_FUNCTION_DURING_YOUR_USUAL_ACTIVITIES_OF_DAILY_LIVING_FROM_0_TO_100_WITH_100_BEING_YOUR_LEVEL_OF_FUNCTION_PRIOR_TO_YOUR_HIP_PROBLEM_AND_0_BEING_THE_INABILITY_TO_PERFORM_ANY_OF_YOUR_USUAL_DAILY_ACTIVITIES?: 10
ABILITY_TO_PARTICIPATE_IN_YOUR_DESIRED_SPORT_AS_LONG_AS_YOU_WOULD_LIKE: UNABLE TO DO
TWISTING/PIVOTING_ON_INVOLVED_LEG: MODERATE DIFFICULTY
RECREATIONAL ACTIVITIES: UNABLE TO DO
HEAVY_WORK: UNABLE TO DO
PUTTING ON SOCKS AND SHOES: NO DIFFICULTY AT ALL
SITTING_FOR_15_MINUTES: NO DIFFICULTY AT ALL
DEEP SQUATTING: EXTREME DIFFICULTY
STEPPING_UP_AND_DOWN_CURBS: SLIGHT DIFFICULTY
GETTING INTO AND OUT OF AN AVERAGE CAR: MODERATE DIFFICULTY
GETTING_INTO_AND_OUT_OF_AN_AVERAGE_CAR: MODERATE DIFFICULTY
JUMPING: UNABLE TO DO
ABILITY_TO_PERFORM_ACTIVITY_WITH_YOUR_NORMAL_TECHNIQUE: UNABLE TO DO
SWINGING_OBJECTS_LIKE_A_GOLF_CLUB: MODERATE DIFFICULTY
PUTTING_ON_SOCKS_AND_SHOES: NO DIFFICULTY AT ALL
WALKING_15_MINUTES_OR_GREATER: UNABLE TO DO
ADL_TOTAL_ITEM_SCORE: 0
WALKING_15_MINUTES_OR_GREATER: UNABLE TO DO
HOS_ADL_HIGHEST_POTENTIAL_SCORE: 40
RUNNING_ONE_MILE: UNABLE TO DO
HOS_ADL_ITEM_SCORE_TOTAL: 13
ADL_SCORE(%): 0
SPORTS_HIGHEST_POTENTIAL_SCORE: 36
HOW_WOULD_YOU_RATE_YOUR_CURRENT_LEVEL_OF_FUNCTION?: SEVERELY ABNORMAL

## 2024-12-23 ENCOUNTER — THERAPY VISIT (OUTPATIENT)
Dept: PHYSICAL THERAPY | Facility: CLINIC | Age: 84
End: 2024-12-23
Payer: COMMERCIAL

## 2024-12-23 DIAGNOSIS — Z96.642 S/P TOTAL LEFT HIP ARTHROPLASTY: ICD-10-CM

## 2024-12-23 PROCEDURE — 97161 PT EVAL LOW COMPLEX 20 MIN: CPT | Mod: GP | Performed by: PHYSICAL THERAPIST

## 2024-12-23 PROCEDURE — 97110 THERAPEUTIC EXERCISES: CPT | Mod: GP | Performed by: PHYSICAL THERAPIST

## 2024-12-23 NOTE — PROGRESS NOTES
PHYSICAL THERAPY EVALUATION  Type of Visit: Evaluation       Fall Risk Screen:  Fall screen completed by: PT  Have you fallen 2 or more times in the past year?: (Patient-Rptd) No  Have you fallen and had an injury in the past year?: (Patient-Rptd) No  Is patient a fall risk?: Yes    Subjective      Condition type:  Initial onset (within last 3 months)  Cause of current episode:  Post Surgical  Type of surgery: 5-Joint Replacement  Nature of treatment:  Rehabilitative  Functional ability:  Moderate functional limitations  Documented POC (choose all that apply):  Measurable short and long term/discharge treatment goals related to physical and functional deficits.;Frequency of treatment visits and treatment activities to address deficit areas.;Patient agrees to program participation including home program  Briefly describe symptoms:  dully achy swollen  How did the symptoms start:  with surgery on 12/13/24  Average pain/intensity last 24 hours:  3/10  Average pain/intensity past week:  3/10  Frequency of Symptoms:  Constantly (% of the time)  Symptom impact on ADLs:  Quite a bit  Condition change since eval:  N/A (first visit)  General health reported by patient:  Fair      Presenting condition or subjective complaint: (Patient-Rptd) Left hip replacement  Date of onset: 12/13/24    Relevant medical history:     Dates & types of surgery: (Patient-Rptd) Left leg arthroplasty  12/13/24    Prior diagnostic imaging/testing results: (Patient-Rptd) MRI; X-ray     Prior therapy history for the same diagnosis, illness or injury: (Patient-Rptd) No      Prior Level of Function: Independent    Living Environment  Social support: (Patient-Rptd) Alone   Type of home: (Patient-Rptd) Apartment/condo   Stairs to enter the home: (Patient-Rptd) No       Ramp: (Patient-Rptd) No   Stairs inside the home: (Patient-Rptd) No       Help at home: (Patient-Rptd) Self Cares (home health aide/personal care attendant, family, etc); Home  management tasks (cooking, cleaning)  Equipment owned: (Patient-Rptd) Four-point cane; Walker; Walker with wheels; Raised toilet seat; Dressing equipment; Lift chair     Employment: (Patient-Rptd) No    Hobbies/Interests:  walking her dog    Patient goals for therapy: (Patient-Rptd) Ambulate , use leg as normal.    Pain assessment: Pain present- L hip     Objective     WALTER EVALUATION  INTEGUMENTARY (edema, incisions): incision clean, dry, without oozing or other s/s of infection  GAIT:   Weightbearing Status: WBAT  Assistive Device(s): Walker (standard)  Gait Deviations: Antalgic; forward flexed posture  BALANCE/PROPRIOCEPTION: impaired, unable to maintain SL stance on surgical LE  ROM: able to complete L heel slide WFL per surgical precautions when laying down   STRENGTH: formal MMT strength assessment deferred 2/2 proximity of surgical date  LE FLEXIBILITY: Decreased in hamstrings  FUNCTIONAL TESTS:  sit to stand able to complete with light UE support on thighs  PALPATION: unremarkable- as expected for post op LE  JOINT MOBILITY: impaired joint mobility as expected post surgical due to present edema      Assessment & Plan   CLINICAL IMPRESSIONS  Medical Diagnosis: S/P total left hip arthroplasty    Treatment Diagnosis: pain L hip   Impression/Assessment: Patient is a 84 year old female s/p L WALTER on 12/13/24 complaints.  The following significant findings have been identified: Pain, Decreased ROM/flexibility, Decreased joint mobility, Decreased strength, Impaired balance, Edema, Impaired gait, Impaired muscle performance, Decreased activity tolerance, and Impaired posture. These impairments interfere with their ability to perform self care tasks, recreational activities, household chores, driving , household mobility, and community mobility as compared to previous level of function.     Clinical Decision Making (Complexity):  Clinical Presentation: Stable/Uncomplicated  Clinical Presentation Rationale: based on  medical and personal factors listed in PT evaluation  Clinical Decision Making (Complexity): Low complexity    PLAN OF CARE  Treatment Interventions:  Interventions: Gait Training, Manual Therapy, Neuromuscular Re-education, Therapeutic Activity, Therapeutic Exercise, Self-Care/Home Management    Long Term Goals     PT Goal 1  Goal Identifier: HEP  Goal Description: Patient will be consistent and independent with HEP in order to achieve functional goals.  Rationale: to maximize safety and independence with performance of ADLs and functional tasks;to maximize safety and independence within the home;to maximize safety and independence within the community;to maximize safety and independence with self cares  Target Date: 01/20/25  PT Goal 2  Goal Identifier: Walking  Goal Description: Patient will tolerate walking her dog at least 1 mile with LRAD to return to physical activity routine.  Rationale: to maximize safety and independence with performance of ADLs and functional tasks;to maximize safety and independence within the home;to maximize safety and independence within the community;to maximize safety and independence with transportation;to maximize safety and independence with self cares  Target Date: 03/03/25  PT Goal 3  Goal Identifier: Balance  Goal Description: Patient will be able to complete TUG with LRAD in <12 seconds to indicate decreased fall risk with ambulation.  Rationale: to maximize safety and independence with performance of ADLs and functional tasks;to maximize safety and independence with self cares  Target Date: 03/03/25      Frequency of Treatment: 1x per week  Duration of Treatment: 10 weeks    Recommended Referrals to Other Professionals:   Education Assessment:   Learner/Method: Patient;Pictures/Video  Education Comments: Established POC, discussed TEX/PHYS of pain, HEP frequency, and activity mod    Risks and benefits of evaluation/treatment have been explained.   Patient/Family/caregiver  agrees with Plan of Care.     Evaluation Time:     PT Eval, Low Complexity Minutes (90056): 15     Signing Clinician: Marie Salazar, PT        [facility]                                                                                   OUTPATIENT PHYSICAL THERAPY      PLAN OF TREATMENT FOR OUTPATIENT REHABILITATION   Patient's Last Name, First Name, Ginger Vu YOB: 1940   Provider's Name   [facility]   Medical Record No.  [id number]     Onset Date: 12/13/24  Start of Care Date: 12/23/24     Medical Diagnosis:  S/P total left hip arthroplasty      PT Treatment Diagnosis:  pain L hip Plan of Treatment  Frequency/Duration: 1x per week/ 10 weeks    Certification date from 12/23/24 to 03/03/25         See note for plan of treatment details and functional goals     Marie Salazar, PT                         I CERTIFY THE NEED FOR THESE SERVICES FURNISHED UNDER        THIS PLAN OF TREATMENT AND WHILE UNDER MY CARE     (Physician attestation of this document indicates review and certification of the therapy plan).              Referring Provider:  Eugenio Jeffries    Initial Assessment  See Epic Evaluation- Start of Care Date: 12/23/24

## 2024-12-23 NOTE — TELEPHONE ENCOUNTER
"FUTURE VISIT INFORMATION      FUTURE VISIT INFORMATION:  Date: 3/20/25  Time: 1:15 PM  Location: [facility]   REFERRAL INFORMATION:  Referring provider:  Duy Leyva MD   Referring providers clinic:  [facility]   Reason for visit/diagnosis:  Choking sensation, ref'd by Duy Leyva MD, pt made appt, [facility] location     RECORDS REQUESTED FROM      Clinic name Comments Records Status Imaging Status   [facility]  6/25/24 referral/ OV- Duy Leyva MD  [facility]    [facility] 3/20/23 US THYROID   2/11/23 CT CERVICAL   10/21/22 XR CHEST [facility] PACS   [facility]  11/7/24 OV -Kodi Quesada, MARYSOL  Epic            \"Please notify/message CSS if patient completed outside imaging prior to scheduled appointment and/or any outside records that might have been missed at pre visit -Thanks\"  "

## 2024-12-30 ENCOUNTER — OFFICE VISIT (OUTPATIENT)
Dept: ORTHOPEDICS | Facility: CLINIC | Age: 84
End: 2024-12-30
Payer: COMMERCIAL

## 2024-12-30 ENCOUNTER — THERAPY VISIT (OUTPATIENT)
Dept: PHYSICAL THERAPY | Facility: CLINIC | Age: 84
End: 2024-12-30
Payer: COMMERCIAL

## 2024-12-30 VITALS — SYSTOLIC BLOOD PRESSURE: 132 MMHG | BODY MASS INDEX: 33.78 KG/M2 | DIASTOLIC BLOOD PRESSURE: 70 MMHG | WEIGHT: 206.1 LBS

## 2024-12-30 DIAGNOSIS — R32 URINARY INCONTINENCE, UNSPECIFIED TYPE: ICD-10-CM

## 2024-12-30 DIAGNOSIS — R30.0 DYSURIA: ICD-10-CM

## 2024-12-30 DIAGNOSIS — M25.552 HIP PAIN, LEFT: ICD-10-CM

## 2024-12-30 DIAGNOSIS — Z96.642 S/P TOTAL LEFT HIP ARTHROPLASTY: Primary | ICD-10-CM

## 2024-12-30 DIAGNOSIS — Z96.642 STATUS POST TOTAL REPLACEMENT OF LEFT HIP: Primary | ICD-10-CM

## 2024-12-30 DIAGNOSIS — Z74.09 IMPAIRED MOBILITY: ICD-10-CM

## 2024-12-30 PROCEDURE — 97110 THERAPEUTIC EXERCISES: CPT | Mod: GP | Performed by: PHYSICAL THERAPIST

## 2024-12-30 PROCEDURE — 99212 OFFICE O/P EST SF 10 MIN: CPT | Mod: 24 | Performed by: PHYSICIAN ASSISTANT

## 2024-12-30 PROCEDURE — 97112 NEUROMUSCULAR REEDUCATION: CPT | Mod: GP | Performed by: PHYSICAL THERAPIST

## 2024-12-30 NOTE — LETTER
12/30/2024      Ginger Marshall  [address]  [place] [address]      Dear Colleague,    Thank you for referring your patient, Ginger Marshall, to the [facility] [place]. Please see a copy of my visit note below.    Date of Service: Dec 30, 2024    Chief Complaint: Post operative follow up.     Date of Surgery: 12/13/2024    Procedure Performed: Left total hip arthroplasty    Interval events: Ginger Marshall is a 84 year old female who presents today for a postoperative follow up.  She tells me that the pain she had prior to surgery has gone away and now complains mostly of incisional site pain that is improving from her date of surgery.  She denies any fevers, drainage or concerns for infection.  Take Tylenol as needed for pain.    The past medical history was reviewed updated in the EMR. This includes medications, surgeries, social history, and review of systems.    Physical examination:  Blood pressure 132/70, weight 93.5 kg (206 lb 1.6 oz), not currently breastfeeding.  Well-developed, well-nourished and in no acute distress.  Alert and oriented to surroundings.  On examination of the  left hip, incision is well-healed. There is no erythema, drainage, or dehiscence. Swelling is Mild the right lower extremity.  Sensation is intact in the bilateral lower extremity.  Dorsalis pedis pulses palpable.  Calf and thigh are soft and nontender without signs of compartment syndrome.  She will dorsiflex plantarflex without deficit.      Assessment: 84 year old female s/p left total hip arthroplasty,progressing appropriately.     Plan: Ginger presents today for her first postoperative visit.  We discussed that on exam her incision site is well-healing without signs of infection.  We discussed that at this time she should continue with her physical therapy.  She did inquire about a referral for pelvic floor strengthening and this was placed for her today.  She did also mention that she  does note some dysuria and request a urinary test.  This was placed for her as well and results will be called to her once completed.  We discussed that this time she can shower and let water run over her incision site but to avoid scrubbing as well as soaking her incision site.  Once again she was reminded of her hip precautions.  Swelling in the right lower extremity overall her exam was negative and would recommend compressive wrapping with an Ace wrap.  She and her daughter were understanding this plan and agreement.  They will follow-up as scheduled with her surgical team.  All questions answered.      Patsy Rodriguez PA-C  December 30, 2024 2:29 PM   Orthopaedic Surgery       Again, thank you for allowing me to participate in the care of your patient.        Sincerely,        Patsy Rodriguez PA-C    Electronically signed

## 2024-12-30 NOTE — PROGRESS NOTES
Date of Service: Dec 30, 2024    Chief Complaint: Post operative follow up.     Date of Surgery: 12/13/2024    Procedure Performed: Left total hip arthroplasty    Interval events: Ginger Marshall is a 84 year old female who presents today for a postoperative follow up.  She tells me that the pain she had prior to surgery has gone away and now complains mostly of incisional site pain that is improving from her date of surgery.  She denies any fevers, drainage or concerns for infection.  Take Tylenol as needed for pain.    The past medical history was reviewed updated in the EMR. This includes medications, surgeries, social history, and review of systems.    Physical examination:  Blood pressure 132/70, weight 93.5 kg (206 lb 1.6 oz), not currently breastfeeding.  Well-developed, well-nourished and in no acute distress.  Alert and oriented to surroundings.  On examination of the  left hip, incision is well-healed. There is no erythema, drainage, or dehiscence. Swelling is Mild the right lower extremity.  Sensation is intact in the bilateral lower extremity.  Dorsalis pedis pulses palpable.  Calf and thigh are soft and nontender without signs of compartment syndrome.  She will dorsiflex plantarflex without deficit.      Assessment: 84 year old female s/p left total hip arthroplasty,progressing appropriately.     Plan: Ginger presents today for her first postoperative visit.  We discussed that on exam her incision site is well-healing without signs of infection.  We discussed that at this time she should continue with her physical therapy.  She did inquire about a referral for pelvic floor strengthening and this was placed for her today.  She did also mention that she does note some dysuria and request a urinary test.  This was placed for her as well and results will be called to her once completed.  We discussed that this time she can shower and let water run over her incision site but to avoid scrubbing as well as soaking  her incision site.  Once again she was reminded of her hip precautions.  Swelling in the right lower extremity overall her exam was negative and would recommend compressive wrapping with an Ace wrap.  She and her daughter were understanding this plan and agreement.  They will follow-up as scheduled with her surgical team.  All questions answered.      Patsy Rodriguez PA-C  December 30, 2024 2:29 PM   Orthopaedic Surgery

## 2024-12-30 NOTE — PROGRESS NOTES
PT PROGRESS NOTE    ASSESSMENT  Patient is progressing as expected s/p L WALTER. She continues to have L lateral hip pain especially when she is weight bearing or walking. Completing her exercises consistently. Continues to be most safe when ambulating with 4WW as compared to a SPC.  PLAN  Conctinue POC 1x/week for more weeks  GOALS  Progressing       12/30/24 0500   Appointment Info   Signing clinician's name / credentials Marie Salazar PT, DPT   Visits Used 2   Medical Diagnosis S/P total left hip arthroplasty   PT Tx Diagnosis pain L hip   Precautions/Limitations Posterior hip precautions   Progress Note/Certification   Start of Care Date 12/23/24   Onset of illness/injury or Date of Surgery 12/13/24   Therapy Frequency 1x per week   Predicted Duration 10 weeks   Certification date from 12/23/24   Certification date to 03/03/25   Progress Note Due Date 03/03/25   [facility] Authorization Information   Condition type Initial onset (within last 3 months)   Cause of current episode Post Surgical   Type of surgery 5-Joint Replacement   Nature of treatment Rehabilitative   Documented POC (choose all that apply) Measurable short and long term/discharge treatment goals related to physical and functional deficits.;Frequency of treatment visits and treatment activities to address deficit areas.;Patient agrees to program participation including home program   How did the symptoms start with surgery on 12/13/24   General health reported by patient Fair   PT Goal 1   Goal Identifier HEP   Goal Description Patient will be consistent and independent with HEP in order to achieve functional goals.   Rationale to maximize safety and independence with performance of ADLs and functional tasks;to maximize safety and independence within the home;to maximize safety and independence within the community;to maximize safety and independence with self cares   Goal Progress progressing   Target Date 01/20/25   PT Goal 2   Goal Identifier Walking    Goal Description Patient will tolerate walking her dog at least 1 mile with LRAD to return to physical activity routine.   Rationale to maximize safety and independence with performance of ADLs and functional tasks;to maximize safety and independence within the home;to maximize safety and independence within the community;to maximize safety and independence with transportation;to maximize safety and independence with self cares   Goal Progress progressing; attempted SPC today but remains unstable   Target Date 03/03/25   PT Goal 3   Goal Identifier Balance   Goal Description Patient will be able to complete TUG with LRAD in <12 seconds to indicate decreased fall risk with ambulation.   Rationale to maximize safety and independence with performance of ADLs and functional tasks;to maximize safety and independence with self cares   Target Date 03/03/25   Subjective Report   Subjective Report Patient reports that she is doing okay, but that she is continuing to have achy discomfort on the L side.   Therapeutic Procedure/Exercise   Therapeutic Procedures: strength, endurance, ROM, flexibility minutes (15023) 28   PTRx Ther Proc 1 Ankle Flex   PTRx Ther Proc 1 - Details x20 every 3 hours   PTRx Ther Proc 2 Sit to Stand   PTRx Ther Proc 2 - Details 2x5 from raised height without UE support   PTRx Ther Proc 3 Short Arc Knee Extension   PTRx Ther Proc 3 - Details completed seated and standing x20 for stregntheing; recommended towel roll   PTRx Ther Proc 4 Standing Hip Flexor Stretch   PTRx Ther Proc 4 - Details continues to have increased L sided tension   PTRx Ther Proc 5 Hooklying Hip Abduction   PTRx Ther Proc 5 - Details small movement L side no resistance x10   PTRx Ther Proc 6 Bridging #1   PTRx Ther Proc 6 - Details x10 with cues for glute activations   PTRx Ther Proc 7 Standing Hip Abduciton   PTRx Ther Proc 7 - Details not tolerated today; can complete supine on table   Ther Proc 2 Heel Slide and hip flexion    Ther Proc 2 - Details supine within precautions   Neuromuscular Re-education   Neuromuscular re-ed of mvmt, balance, coord, kinesthetic sense, posture, proprioception minutes (35995) 11   Neuro Re-ed 1 weight shifting with walker   Neuro Re-ed 1 - Details toelrating lateral and forward/backwards   Neuro Re-ed 2 Attempted single leg stance   Neuro Re-ed 2 - Details x30 seconds needed light UE   Neuromuscular Re-education Neuro Re-ed 2   Skilled Intervention for improved stability   Patient Response/Progress remains unstable, does not demontrate safety with use of SPC at this point in time   Education   Learner/Method Patient;Family;Demonstration;No Barriers to Learning;Pictures/Video   Education Comments progression of HEP   Plan   Home program Ptrx HEP   Plan for next session progress with standing activities   Total Session Time   Timed Code Treatment Minutes 39   Total Treatment Time (sum of timed and untimed services) 39

## 2024-12-30 NOTE — PATIENT INSTRUCTIONS
1. Status post total replacement of left hip    2. Urinary incontinence, unspecified type    3. Dysuria        Recommendations  - OTC Tylenol 1000 mg every 8 hours for pain.   - Apply and alternate heat and ice  -Avoid topical creams or lotion over the incision  -Shower and let water run over it, avoid scrubbing the area, and avoid soaking the area. Pad dry when done, steri strips will fall off and let them as they do  -Continue with hip precautions    Physical/Occupational therapy referral placed    Follow up with Dr Jeffries as scheduled.    -Call direct clinic number [[phone]] at any time with questions or concerns.    -Orthopedic Walk in Monday through Thursday 8 am to 6 pm, Friday 8 am to 5 pm, Saturday 8 am to 12 pm at [address] [facility] [address] [place].

## 2025-01-02 ENCOUNTER — LAB (OUTPATIENT)
Dept: LAB | Facility: CLINIC | Age: 85
End: 2025-01-02
Payer: COMMERCIAL

## 2025-01-02 DIAGNOSIS — N39.0 URINARY TRACT INFECTION WITHOUT HEMATURIA, SITE UNSPECIFIED: Primary | ICD-10-CM

## 2025-01-02 DIAGNOSIS — R30.0 DYSURIA: ICD-10-CM

## 2025-01-02 LAB
ALBUMIN UR-MCNC: NEGATIVE MG/DL
APPEARANCE UR: ABNORMAL
BACTERIA #/AREA URNS HPF: ABNORMAL /HPF
BILIRUB UR QL STRIP: NEGATIVE
COLOR UR AUTO: YELLOW
GLUCOSE UR STRIP-MCNC: NEGATIVE MG/DL
HGB UR QL STRIP: ABNORMAL
KETONES UR STRIP-MCNC: NEGATIVE MG/DL
LEUKOCYTE ESTERASE UR QL STRIP: ABNORMAL
NITRATE UR QL: NEGATIVE
PH UR STRIP: 5.5 [PH] (ref 5–7)
RBC #/AREA URNS AUTO: ABNORMAL /HPF
SP GR UR STRIP: 1.01 (ref 1–1.03)
SQUAMOUS #/AREA URNS AUTO: ABNORMAL /LPF
UROBILINOGEN UR STRIP-ACNC: 0.2 E.U./DL
WBC #/AREA URNS AUTO: >100 /HPF
WBC CLUMPS #/AREA URNS HPF: PRESENT /HPF

## 2025-01-02 RX ORDER — NITROFURANTOIN 25; 75 MG/1; MG/1
100 CAPSULE ORAL 2 TIMES DAILY
Qty: 10 CAPSULE | Refills: 0 | Status: SHIPPED | OUTPATIENT
Start: 2025-01-02 | End: 2025-01-07

## 2025-01-04 LAB — BACTERIA UR CULT: ABNORMAL

## 2025-01-06 DIAGNOSIS — N39.0 URINARY TRACT INFECTION WITHOUT HEMATURIA, SITE UNSPECIFIED: Primary | ICD-10-CM

## 2025-01-06 RX ORDER — CIPROFLOXACIN 250 MG/1
250 TABLET, FILM COATED ORAL 2 TIMES DAILY
Qty: 6 TABLET | Refills: 0 | Status: SHIPPED | OUTPATIENT
Start: 2025-01-06 | End: 2025-01-09

## 2025-01-10 NOTE — COMMUNITY RESOURCES LIST (ENGLISH)
10/25/2023   [facility]  N/A  For questions about this resource list or additional care needs, please contact your primary care clinic or care manager.  Phone: [phone]   Email: N/A   Address: [address] [place] [id number]   Hours: N/A        Food and Nutrition       Food pantry  1  [address] [place] [address] Distance: 0.79 miles      In-Person, Pickup   [address] [place] [address]  Language: English  Hours: Mon 8:00 AM - 4:00 PM , Tue 8:00 AM - 7:00 PM , Wed - Thu 8:00 AM - 4:00 PM  Fees: Free   Phone: (105) 350-4431 Email: info@[employer] Website: https://[employer]/resources/resource-centers/     2  [facility] Distance: 2.32 miles      In-Person   [address] [place] [address]  Language: English, Persian  Hours: Mon - Fri 5:00 AM - 10:00 PM  Fees: Free   Phone: (344) 990-2392 Email: admissions@[email or web address] Website: http://www.[email or web address]     SNAP application assistance  3  [employer] ([facility] Distance: 0.5 miles      In-Person   [address], [place] [address]  Language: English, Persian  Hours: Mon - Fri 8:00 AM - 8:00 PM  Fees: Free   Phone: (447) 763-6277 Email: info@[facility]org Website: http://www.[employer].org     4  [address] [place] [address] Distance: 0.79 miles      In-Person   [address] [place] [address]  Language: English  Hours: Mon 8:00 AM - 4:00 PM , Tue 8:00 AM - 7:00 PM , Wed - Thu 8:00 AM - 4:00 PM  Fees: Free   Phone: (961) 942-1763 Email: info@[employer] Website: https://[employer]/resources/resource-centers/     Soup kitchen or free meals  5  [facility] Distance: 4.2 miles      Pickup   [address] [place] [address]  Language: English, Persian  Hours: Mon - Thu 5:30 PM - 6:30 PM  Fees: Free   Phone:  (947) 940-7311 Email: missy@[employer] Website: http://[employer]/wordpress/?page_id=5168     6  [facility] Distance: 9.29 miles      In-Person, Pickup   [address] [place] [address]  Language: English  Hours: Mon - Thu 5:00 PM - 6:30 PM  Fees: Free   Phone: (831) 575-6524 Email: office@"[employer] Website: http://"[employer]/          Important Numbers & Websites       Emergency Services   911  [facility]   311  Poison Control   (154) 800-3104  Suicide Prevention Lifeline   (865) 801-7460 (TALK)  Child Abuse Hotline   (504) 411-5149 (4-A-Child)  Sexual Assault Hotline   (861) 535-7227 (HOPE)  National Runaway Safeline   (577) 122-1940 (RUNAWAY)  All-Options Talkline   (319) 732-8206  Substance Abuse Referral   (730) 256-7685 (HELP)     0

## 2025-01-13 ENCOUNTER — THERAPY VISIT (OUTPATIENT)
Dept: PHYSICAL THERAPY | Facility: CLINIC | Age: 85
End: 2025-01-13
Payer: COMMERCIAL

## 2025-01-13 DIAGNOSIS — Z96.642 S/P TOTAL LEFT HIP ARTHROPLASTY: Primary | ICD-10-CM

## 2025-01-13 PROCEDURE — 97110 THERAPEUTIC EXERCISES: CPT | Mod: GP

## 2025-01-13 PROCEDURE — 97112 NEUROMUSCULAR REEDUCATION: CPT | Mod: GP

## 2025-01-15 ASSESSMENT — PATIENT HEALTH QUESTIONNAIRE - PHQ9
SUM OF ALL RESPONSES TO PHQ QUESTIONS 1-9: 6
10. IF YOU CHECKED OFF ANY PROBLEMS, HOW DIFFICULT HAVE THESE PROBLEMS MADE IT FOR YOU TO DO YOUR WORK, TAKE CARE OF THINGS AT HOME, OR GET ALONG WITH OTHER PEOPLE: NOT DIFFICULT AT ALL
SUM OF ALL RESPONSES TO PHQ QUESTIONS 1-9: 6

## 2025-01-16 ENCOUNTER — VIRTUAL VISIT (OUTPATIENT)
Dept: PSYCHOLOGY | Facility: CLINIC | Age: 85
End: 2025-01-16
Payer: COMMERCIAL

## 2025-01-16 DIAGNOSIS — F33.1 MODERATE EPISODE OF RECURRENT MAJOR DEPRESSIVE DISORDER (H): Primary | ICD-10-CM

## 2025-01-16 NOTE — PROGRESS NOTES
[facility]                                     Progress Note    Patient Name: Ginger Marshall  Date: 1/16/25         Service Type: Individual      Session Start Time: 9:03 am  Session End Time: 9:56 am     Session Length: 53 min    Session #: 22    Answers submitted by the patient for this visit:    Attendees: Client attended alone    Service Modality:  Video Visit:      Provider verified identity through the following two step process.  Patient provided:  Patient is known previously to provider    Telemedicine Visit: The patient's condition can be safely assessed and treated via synchronous audio and visual telemedicine encounter.      Reason for Telemedicine Visit: Patient convenience (e.g. access to timely appointments / distance to available provider)    Originating Site (Patient Location): Patient's home    Distant Site (Provider Location): Provider Remote Setting- Home Office    Consent:  The patient/guardian has verbally consented to: the potential risks and benefits of telemedicine (video visit) versus in person care; bill my insurance or make self-payment for services provided; and responsibility for payment of non-covered services.     Patient would like the video invitation sent by:  My Chart    Mode of Communication:  Video Conference via [employer] license of [facility]    Distant Location (Provider):  Off-site    As the provider I attest to compliance with applicable laws and regulations related to telemedicine.    DATA  Interactive Complexity: No     Crisis: No    Extended Session (53+ minutes): PROLONGED SERVICE IN THE OUTPATIENT SETTING REQUIRING DIRECT (FACE-TO-FACE) PATIENT CONTACT BEYOND THE USUAL SERVICE:    - Longer session due to limited access to mental health appointments and necessity to address patient's distress / complexity  Interactive Complexity: No  Crisis: No      Progress Since Last Session (Related to Symptoms / Goals / Homework):   Symptoms: Improving mood , low energy    Homework:  Partially completed      Episode of Care Goals: Minimal progress - ACTION (Actively working towards change); Intervened by reinforcing change plan / affirming steps taken     Current / Ongoing Stressors and Concerns:  Continuing pain. Daughter providing in home care. Receiving [facility] for support, impatience with responses. Low quality sleep, stopped using CPAP.   Ongoing: Grieving loss of adult son who was blind and lived alone, fell and passed away, second loss of an adult/child. Regrets about parenting.      Treatment Objective(s) Addressed in This Session:    Practice boundaries and radical acceptance of reality regarding sister, sons, reality of accident  Patient will Increase interest, engagement, and pleasure in doing things  Decrease frequency and intensity of feeling down, depressed, hopeless  Improve quantity and quality of night time sleep / decrease daytime naps  Feel less tired and more energy during the day   Improve diet, appetite, mindful eating, and / or meal planning  Identify negative self-talk and behaviors: challenge core beliefs, myths, and actions  Improve concentration, focus, and mindfulness in daily activities   Feel less fidgety, restless or slow in daily activities / interpersonal interactions.     Intervention:  Supportive therapy: Provided active listening and validation. Reviewed grounding/mindfulness skills, coping skills, support seeking actions, acceptance of reality, conceptualized strategies for improving reading experience  EMDR: Reinforced calm safe state practice    Motivational Interviewing-MAINTAIN  Target Behavior:  radical acceptance of reality, gratitude , jena practices  Proactive communication with providers, [facility], attend appointments, pursue injectable medication for improved pain management    MI Intervention: Expressed Empathy/Understanding, Supported Autonomy, Collaboration, Evocation and Open-ended questions     Change Talk Expressed by the  Patient: Desire to change Ability to change Reasons to change Activation Taking steps    Provider Response to Change Talk: E - Evoked more info from patient about behavior change, A - Affirmed patient's thoughts, decisions, or attempts at behavior change, and R - Reflected patient's change talk    Assessments completed prior to visit:  LAURA  12/5/22  The following assessments were completed by patient for this visit:  PHQ2:       2/27/2023     8:50 AM 2/24/2023     6:48 AM 2/20/2023     5:39 PM 1/15/2023    11:54 AM 11/16/2022     1:33 PM 8/5/2022     8:11 AM 7/6/2022     4:18 PM   PHQ-2 ( 1999 [employer])   Q1: Little interest or pleasure in doing things 0 1 1 1 1  3    Q2: Feeling down, depressed or hopeless 0 1 1 1 1  0    PHQ-2 Score 0 2 2 2 2 3    Q1: Little interest or pleasure in doing things  Several days Several days Several days Several days Nearly every day    Q2: Feeling down, depressed or hopeless  Several days Several days Several days Several days Not at all    PHQ-2 Score  2 2 2 2 3 Incomplete       Proxy-reported     PHQ9:       7/7/2024     7:30 AM 8/12/2024     1:53 PM 8/19/2024     9:41 AM 9/27/2024     8:38 AM 10/7/2024     7:09 PM 11/18/2024     9:10 PM 1/15/2025    12:07 PM   PHQ-9 SCORE   PHQ-9 Total Score MyChart 6 (Mild depression) 10 (Moderate depression) 11 (Moderate depression)  3 (Minimal depression) 5 (Mild depression) 6 (Mild depression)   PHQ-9 Total Score 6 10 11 8 3 5  6        Patient-reported     Patient Health Questionnaire (Submitted on 9/26/2023)  If you checked off any problems, how difficult have these problems made it for you to do your work, take care of things at home, or get along with other people?: Somewhat difficult  PHQ9 TOTAL SCORE: 4  GAD2:       3/13/2024     6:28 PM 4/21/2024     1:25 PM 7/7/2024     7:31 AM 8/19/2024     9:42 AM 10/4/2024     6:36 AM 11/18/2024     9:10 PM 1/15/2025    12:08 PM   SPRING-2   Feeling nervous, anxious, or on edge 1 0 0 0 0 0 0   Not being  able to stop or control worrying 0 0 0 0 0 0 0   SPRING-2 Total Score 1 0 0 0 0 0  0        Patient-reported     GAD7:       7/6/2022     4:18 PM 8/5/2022     8:11 AM 11/16/2022     1:33 PM 6/5/2023     2:31 PM 2/28/2024    10:14 AM 4/2/2024     9:05 PM 5/31/2024     9:14 AM   SPRING-7 SCORE   Total Score 2 (minimal anxiety) 5 (mild anxiety) 2 (minimal anxiety)  5 (mild anxiety) 1 (minimal anxiety) 1 (minimal anxiety)   Total Score 2 5 2 3 5 1 1     CAGE-AID:       12/5/2022     9:28 AM   CAGE-AID Total Score   Total Score 0   Total Score MyChart 0 (A total score of 2 or greater is considered clinically significant)     PROMIS 10-Global Health (only subscores and total score):       3/12/2023    10:38 AM 8/23/2023     8:34 AM 12/14/2023     6:28 AM 3/13/2024     6:31 PM 7/7/2024     7:34 AM 9/1/2024    11:20 AM 1/11/2025    10:48 AM   PROMIS-10 Scores Only   Global Mental Health Score 8    8 12 13 11 13 10 9    Global Physical Health Score 11    11 12 12 11 12 9 11    PROMIS TOTAL - SUBSCORES 19    19 24 25 22 25 19 20        Patient-reported     [place] Suicide Severity Rating Scale (Lifetime/Recent)      12/5/2022    11:00 AM 11/15/2024    11:11 AM 12/13/2024    10:53 AM   [place] Suicide Severity Rating (Lifetime/Recent)   Q1 Wished to be Dead (Past Month) no 0-->no 0-->no   Q2 Suicidal Thoughts (Past Month) no 0-->no 0-->no   Q3 Suicidal Thought Method no     Q4 Suicidal Intent without Specific Plan no     Q5 Suicide Intent with Specific Plan no     Q6 Suicide Behavior (Lifetime) no 0-->no 0-->no   Level of Risk per Screen low risk no risks indicated no risks indicated         ASSESSMENT: Current Emotional / Mental Status (status of significant symptoms):   Risk status (Self / Other harm or suicidal ideation)   Patient denies current fears or concerns for personal safety.   Patient denies current or recent suicidal ideation or behaviors.   Patient denies current or recent homicidal ideation or behaviors.   Patient  denies current or recent self injurious behavior or ideation.   Patient denies other safety concerns.   Patient reports there has been no change in risk factors since their last session.     Patient reports there has been no change in protective factors since their last session.     Recommended that patient call 911 or go to the local ED should there be a change in any of these risk factors.     Appearance:   Appropriate    Eye Contact:   Good    Psychomotor Behavior: Normal    Attitude:   Pleasant Attentive   Orientation:   All   Speech    Rate / Production: Normal     Volume:  Normal    Mood:    Normal depressed   Affect:    Appropriate    Thought Content:  Clear    Thought Form:  Coherent  Logical    Insight:    Good      Medication Review:   No changes to current psychiatric medication(s)     Medication Compliance:   Yes     Changes in Health Issues:   None reported     Chemical Use Review:   Substance Use: Chemical use reviewed, no active concerns identified      Tobacco Use: No current tobacco use.      Diagnosis:  1. Moderate episode of recurrent major depressive disorder (H)          Collateral Reports Completed:   Not Applicable    PLAN: (Patient Tasks / Therapist Tasks / Other)   Embrace radical acceptance and boundaries. Use proactive communication with providers, family, try grounding /mindfulness, loving kindness toward self, use EMDR cue word CHAIR. Consider audio book option via [employer] 1/16/2025                                                                                                  Individual Treatment Plan    Patient's Name: Ginger Marshall  YOB: 1940    Date of Creation: 2/8/23  Date Treatment Plan Last Reviewed/Revised:   8/19/24    DSM5 Diagnoses: 296.31 (F33.0) Major Depressive Disorder, Recurrent Episode, Mild With anxious distress  Psychosocial / Contextual Factors: aging, losses, generational trauma  PROMIS (reviewed every 90 days):   21   12/5/22    Referral / Collaboration:  Referral to another professional/service is not indicated at this time..    Anticipated number of session for this episode of care: 6-9 sessions  Anticipation frequency of session: Every other week  Anticipated Duration of each session: 53 or more minutes  Treatment plan will be reviewed in 90 days or when goals have been changed.     MeasurableTreatment Goal(s) related to diagnosis / functional impairment(s)  Goal 1: Patient will experience an improvement in mood and functioning as evidenced by assessment scores, self report and clinician observation    I will know I've met my goal when things feel better (paraphrase).      Objective #A (Patient Action)    Patient will increase understanding of steps in the grief process   Talk to others about losses  Use prayer practices and jena community to support well being.   Practice boundaries and radical acceptance of reality regarding sister sons, reality of accident   Engage in social activities at [facility]  Status: 8/19/24    Intervention(s)  Therapist will teach CBT, DBT  and support grief processing skills, prayer practices .    Objective #B  Patient will Increase interest, engagement, and pleasure in doing things  Decrease frequency and intensity of feeling down, depressed, hopeless  Improve quantity and quality of night time sleep / decrease daytime naps  Feel less tired and more energy during the day   Improve diet, appetite, mindful eating, and / or meal planning  Identify negative self-talk and behaviors: challenge core beliefs, myths, and actions  Improve concentration, focus, and mindfulness in daily activities   Feel less fidgety, restless or slow in daily activities / interpersonal interactions.  Status: 8/19/2024    Intervention(s)  Therapist will  teach cbt, dbt,MI, mindfulness and behavioral activation and assign homework .      Patient has reviewed and agreed to the above plan.      Rocío Arenas  LICSW  8/19/2024

## 2025-01-20 ENCOUNTER — THERAPY VISIT (OUTPATIENT)
Dept: PHYSICAL THERAPY | Facility: CLINIC | Age: 85
End: 2025-01-20
Payer: COMMERCIAL

## 2025-01-20 DIAGNOSIS — Z96.642 S/P TOTAL LEFT HIP ARTHROPLASTY: Primary | ICD-10-CM

## 2025-01-20 PROCEDURE — 97110 THERAPEUTIC EXERCISES: CPT | Mod: GP

## 2025-01-20 PROCEDURE — 97112 NEUROMUSCULAR REEDUCATION: CPT | Mod: GP

## 2025-01-20 NOTE — PROGRESS NOTES
PT PROGRESS NOTE    ASSESSMENT  Pt is progressing as expected. Now working on against gravity strengthening for L hip muscles as well as progressing dynamic stability with ambulation with cane vs 4WW which is improvement compared to her PLOF pre surgery.     PLAN  Following up in 2 weeks on progressions to HEP and recommendations for home.     GOALS   Pt is progressing towards goals. Anticipate she will meet them in 1-2 more PT visits.      01/20/25 0500   Appointment Info   Signing clinician's name / credentials Marie Salazar PT, DPT; Carolyn Hernandes, SPT   Visits Used 4   Medical Diagnosis S/P total left hip arthroplasty   PT Tx Diagnosis pain L hip   Precautions/Limitations Posterior hip precautions   Progress Note/Certification   Start of Care Date 12/23/24   Onset of illness/injury or Date of Surgery 12/13/24   Therapy Frequency 1x per week   Predicted Duration 10 weeks   Certification date from 12/23/24   Certification date to 12/30/24   Progress Note Due Date 03/03/25   [facility] Authorization Information   Condition type Initial onset (within last 3 months)   Cause of current episode Post Surgical   Type of surgery 5-Joint Replacement   Nature of treatment Rehabilitative   Documented POC (choose all that apply) Measurable short and long term/discharge treatment goals related to physical and functional deficits.;Frequency of treatment visits and treatment activities to address deficit areas.;Patient agrees to program participation including home program   How did the symptoms start with surgery on 12/13/24   General health reported by patient Fair   PT Goal 1   Goal Identifier HEP   Goal Description Patient will be consistent and independent with HEP in order to achieve functional goals.   Rationale to maximize safety and independence with performance of ADLs and functional tasks;to maximize safety and independence within the home;to maximize safety and independence within the community;to maximize safety and  independence with self cares   Goal Progress progressing   Target Date 01/20/25   PT Goal 2   Goal Identifier Walking   Goal Description Patient will tolerate walking her dog at least 1 mile with LRAD to return to physical activity routine.   Rationale to maximize safety and independence with performance of ADLs and functional tasks;to maximize safety and independence within the home;to maximize safety and independence within the community;to maximize safety and independence with transportation;to maximize safety and independence with self cares   Goal Progress progressing; attempted SPC today but remains unstable   Target Date 03/03/25   PT Goal 3   Goal Identifier Balance   Goal Description Patient will be able to complete TUG with LRAD in <12 seconds to indicate decreased fall risk with ambulation.   Rationale to maximize safety and independence with performance of ADLs and functional tasks;to maximize safety and independence with self cares   Target Date 03/03/25   Subjective Report   Subjective Report Pt reporting she's been doing well. Doesn't feel like she needs the walker in her home all the time and she's been sleeping through the night the past few nights.   Objective Measure 1   Objective Measure MMT   Details hip flexion L 3-/5 R 3+/5; knee ext 4/5 B; knee flexion 4/5 B   Therapeutic Procedure/Exercise   Therapeutic Procedures: strength, endurance, ROM, flexibility minutes (09847) 15   Ther Proc 2 Heel Slide and hip flexion   Ther Proc 2 - Details NT   PTRx Ther Proc 1 Ankle Flex   PTRx Ther Proc 1 - Details NT removed from HEP   PTRx Ther Proc 2 Sit to Stand   PTRx Ther Proc 2 - Details NT   PTRx Ther Proc 3 Short Arc Knee Extension   PTRx Ther Proc 3 - Details NT   PTRx Ther Proc 4 Standing Hip Flexor Stretch   PTRx Ther Proc 4 - Details NT   PTRx Ther Proc 5 Hooklying Hip Abduction   PTRx Ther Proc 5 - Details NT   PTRx Ther Proc 6 Bridging #1   PTRx Ther Proc 6 - Details NT   PTRx Ther Proc 7 Hip  Flexion Straight Leg Raise   PTRx Ther Proc 7 - Details x3 to fatigue added to HEP   Skilled Intervention Cues for form and control throughout for improved understanding of HEP   Patient Response/Progress tolerated progressions well   PTRx Ther Proc 8 Clamshell with Theraband   PTRx Ther Proc 8 - Details x15 laying on R added to HEP   Neuromuscular Re-education   Neuromuscular re-ed of mvmt, balance, coord, kinesthetic sense, posture, proprioception minutes (78259) 25   Neuromuscular Re-education Neuro Re-ed 2;Neuro Re-ed 3;Neuro Re-ed 4   Neuro Re-ed 1 weight shifting at counter   Neuro Re-ed 1 - Details x25 R/L   Neuro Re-ed 2 Balance progressions   Neuro Re-ed 2 - Details split stance 30s ea R/L   Neuro Re-ed 3 side stepping   Neuro Re-ed 3 - Details 4 lengths of the counter, no UE support needed this date   PTRx Neuro Re-ed 1 Hip AROM Standing Abduction   PTRx Neuro Re-ed 1 - Details x15 B w counter support    Skilled Intervention for improved stability   Patient Response/Progress balance improving, tolerates activities well, pt took seated rest breaks as needed   Neuro Re-ed 4 amb with cane   Neuro Re-ed 4 - Details 15ftx6; dynamic balance/stability improves with repetition, difficulty with turns. Recommending continued 4WW use for community mobility and longer distance walks, but use of cane in home/building   Education   Learner/Method Patient;Family;Demonstration;No Barriers to Learning;Pictures/Video   Education Comments progression of HEP   Plan   Home program Ptrx HEP   Plan for next session f/u on surgeon visit, progress HEP, potential discharge   Total Session Time   Timed Code Treatment Minutes 40   Total Treatment Time (sum of timed and untimed services) 40

## 2025-01-24 ASSESSMENT — HOOS JR
HOOS JR TOTAL INTERVAL SCORE: 80.56
GOING UP OR DOWN STAIRS: MILD
LYING IN BED (TURNING OVER, MAINTAINING HIP POSITION): MILD
WALKING ON UNEVEN SURFACE: MILD

## 2025-01-28 NOTE — PROGRESS NOTES
[facility]  Orthopaedic Surgery Consultation by Eugenio Jeffries M.D.    Ginger Marshall MRN# [id number]   Age: 84 year old YOB: 1940     Requesting physician: Referred Self  Duy Leyva     Background history:  DX:  OSAS  Morbid obesity with a BMI of 35.2  Hyperlipidemia  Rheumatic mitral valve stenosis  Osteoporosis  Lumbar degenerative disc disease  Rheumatoid arthritis on methotrexate and upadacinitinib    TREATMENTS:  2008, rotator cuff repair shoulder  2008, total hip arthroplasty right  2009, total knee arthroplasty  2012, revision total hip arthroplasty right  2017, L3-L4 posterior fusion  2023, ORIF left distal radius fracture  12/13/2024,Left total hip arthroplasty, Dr. Jeffries           History of Present Illness:     85-year-old female presenting with chronic left hip pain in setting of osteoarthritis and rheumatoid arthritis who underwent left total hip arthroplasty on 12/13/2024.  Today patient presents approximately 6 weeks after the above-mentioned procedure.    She has been working with PT since her surgery. She is pending a few more sessions next week.  She is no longer taking any pain medications for her left hip. She is overall doing well.  The incision is healed well without signs of infection.  She has completed her DVT prophylaxis.  She is abiding by her posterior hip precautions.  She does report rare left knee pain, without instability. She also reports some painless right ankle swelling that is far more swollen than her left.     Social:   Occupation: retired  Living situation: lives alone in apartment   Hobbies / Sports: none    Smoking: No  Alcohol: Yes  Illicit drug use: No         Physical Exam:     EXAMINATION pertinent findings:   PSYCH: Pleasant, healthy-appearing, alert, oriented x3, cooperative. Normal mood and affect.  VITAL SIGNS: not currently breastfeeding.  Reviewed nursing intake notes.   There is no height or weight on file to calculate  BMI.  RESP: non labored breathing   ABD: benign, soft, non-tender, no acute peritoneal findings  SKIN: grossly normal   LYMPHATIC: grossly normal, no adenopathy, no extremity edema  NEURO: grossly normal , no motor deficits  VASCULAR: satisfactory perfusion of all extremities   MUSCULOSKELETAL:   Alignment: Neutral  Gait: Antalgic over left lower extremity.     L hip: painless gentle ROM. Well healing incisional scar.  No tenderness to palpation over greater trochanteric region.     Left LE:   Thigh and leg compartments soft and compressible   +Quad/TA/GSC/FHL/EHL   SILT DP/SP/Nic/Saph/Tib nerve distributions   Palpable dorsalis pedis pulse     Right LE:   Thigh and leg compartments soft and compressible   +Quad/TA/GSC/FHL/EHL   SILT DP/SP/Nic/Saph/Tib nerve distributions   Palpable dorsalis pedis pulse    Marked right ankle swelling, painless   Exquisitely tender Adelaida's sign           Data:   All laboratory data reviewed  All imaging studies reviewed by me personally.    XR pelvis/hip left 1/29/2025:  My interpretation: Status post bilateral total hip arthroplasty.  Adequate sizing, orientation and fixation of components.          Assessment and Plan:   Assessment:  85-year-old female 6 week s/p left hybrid total hip arthroplasty, doing well. With asymmetric, right ankle swelling concerning for acute DVT.      Plan:  The findings above were discussed at length with the patient during today's appointment.  She appears to be doing well and recovering extremely well from her left total hip arthroplasty.  Therefore in regards to her left hip the patient may follow-up with us on an annual basis.  With regards to her right lower extremity, her physical examination findings are a little concerning for a DVT.  Therefore, we will schedule lower extremity venous duplex.  If the findings and legs examination are found to be positive, we will inform the patient and have her follow-up with her primary care physician to  prescribe the appropriate anticoagulation.    Joe Thmoason MD  Adult Reconstruction Fellow    Attending MD (Dr. Eugenio Jeffries) Attestation :  This patient was seen and evaluated by me including a history, exam, and interpretation of all imaging and/or lab data.  I formulated the treatment plan along with either a physician's assistant (MARYSOL) or training physician (resident/fellow), who also saw the patient. That individual or a scribe has documented the visit in the attached note which I approve.    Eugenio Jeffries MD, PhD     Adult Reconstruction  [facility] Department of Orthopaedic Surgery    This note was created using dictation software and may contain errors.  Please contact the creator for any clarifications that are needed.

## 2025-01-29 ENCOUNTER — OFFICE VISIT (OUTPATIENT)
Dept: ORTHOPEDICS | Facility: CLINIC | Age: 85
End: 2025-01-29
Payer: COMMERCIAL

## 2025-01-29 ENCOUNTER — ANCILLARY PROCEDURE (OUTPATIENT)
Dept: GENERAL RADIOLOGY | Facility: CLINIC | Age: 85
End: 2025-01-29
Attending: STUDENT IN AN ORGANIZED HEALTH CARE EDUCATION/TRAINING PROGRAM
Payer: COMMERCIAL

## 2025-01-29 ENCOUNTER — TELEPHONE (OUTPATIENT)
Dept: ORTHOPEDICS | Facility: CLINIC | Age: 85
End: 2025-01-29

## 2025-01-29 DIAGNOSIS — I82.4Z1 ACUTE DEEP VEIN THROMBOSIS (DVT) OF DISTAL END OF RIGHT LOWER EXTREMITY (H): ICD-10-CM

## 2025-01-29 DIAGNOSIS — Z47.89 ORTHOPEDIC AFTERCARE: ICD-10-CM

## 2025-01-29 DIAGNOSIS — Z96.642 STATUS POST TOTAL REPLACEMENT OF LEFT HIP: Primary | ICD-10-CM

## 2025-01-29 PROCEDURE — 99024 POSTOP FOLLOW-UP VISIT: CPT | Mod: GC | Performed by: STUDENT IN AN ORGANIZED HEALTH CARE EDUCATION/TRAINING PROGRAM

## 2025-01-29 PROCEDURE — 73501 X-RAY EXAM HIP UNI 1 VIEW: CPT | Mod: TC | Performed by: RADIOLOGY

## 2025-01-29 NOTE — LETTER
1/29/2025      Ginger Marshall  [address]  [facility] [address]      Dear Colleague,    Thank you for referring your patient, Ginger Marshall, to the [facility] [place]. Please see a copy of my visit note below.        [facility]  Orthopaedic Surgery Consultation by Eugenio Jeffries M.D.    Ginger Marshall MRN# [id number]   Age: 84 year old YOB: 1940     Requesting physician: Referred Self  Duy Leyva     Background history:  DX:  OSAS  Morbid obesity with a BMI of 35.2  Hyperlipidemia  Rheumatic mitral valve stenosis  Osteoporosis  Lumbar degenerative disc disease  Rheumatoid arthritis on methotrexate and upadacinitinib    TREATMENTS:  2008, rotator cuff repair shoulder  2008, total hip arthroplasty right  2009, total knee arthroplasty  2012, revision total hip arthroplasty right  2017, L3-L4 posterior fusion  2023, ORIF left distal radius fracture  12/13/2024,Left total hip arthroplasty, Dr. Jeffries           History of Present Illness:     85-year-old female presenting with chronic left hip pain in setting of osteoarthritis and rheumatoid arthritis who underwent left total hip arthroplasty on 12/13/2024.  Today patient presents approximately 6 weeks after the above-mentioned procedure.    She has been working with PT since her surgery. She is pending a few more sessions next week.  She is no longer taking any pain medications for her left hip. She is overall doing well.  The incision is healed well without signs of infection.  She has completed her DVT prophylaxis.  She is abiding by her posterior hip precautions.  She does report rare left knee pain, without instability. She also reports some painless right ankle swelling that is far more swollen than her left.     Social:   Occupation: retired  Living situation: lives alone in apartment   Hobbies / Sports: none    Smoking: No  Alcohol: Yes  Illicit drug use: No         Physical Exam:     EXAMINATION  pertinent findings:   PSYCH: Pleasant, healthy-appearing, alert, oriented x3, cooperative. Normal mood and affect.  VITAL SIGNS: not currently breastfeeding.  Reviewed nursing intake notes.   There is no height or weight on file to calculate BMI.  RESP: non labored breathing   ABD: benign, soft, non-tender, no acute peritoneal findings  SKIN: grossly normal   LYMPHATIC: grossly normal, no adenopathy, no extremity edema  NEURO: grossly normal , no motor deficits  VASCULAR: satisfactory perfusion of all extremities   MUSCULOSKELETAL:   Alignment: Neutral  Gait: Antalgic over left lower extremity.     L hip: painless gentle ROM. Well healing incisional scar.  No tenderness to palpation over greater trochanteric region.     Left LE:   Thigh and leg compartments soft and compressible   +Quad/TA/GSC/FHL/EHL   SILT DP/SP/Nic/Saph/Tib nerve distributions   Palpable dorsalis pedis pulse     Right LE:   Thigh and leg compartments soft and compressible   +Quad/TA/GSC/FHL/EHL   SILT DP/SP/Nic/Saph/Tib nerve distributions   Palpable dorsalis pedis pulse    Marked right ankle swelling, painless   Exquisitely tender Adelaida's sign           Data:   All laboratory data reviewed  All imaging studies reviewed by me personally.    XR pelvis/hip left 1/29/2025:  My interpretation: Status post bilateral total hip arthroplasty.  Adequate sizing, orientation and fixation of components.          Assessment and Plan:   Assessment:  85-year-old female 6 week s/p left hybrid total hip arthroplasty, doing well. With asymmetric, right ankle swelling concerning for acute DVT.      Plan:  The findings above were discussed at length with the patient during today's appointment.  She appears to be doing well and recovering extremely well from her left total hip arthroplasty.  Therefore in regards to her left hip the patient may follow-up with us on an annual basis.  With regards to her right lower extremity, her physical examination findings are a  little concerning for a DVT.  Therefore, we will schedule lower extremity venous duplex.  If the findings and legs examination are found to be positive, we will inform the patient and have her follow-up with her primary care physician to prescribe the appropriate anticoagulation.    Joe Thomason MD  Adult Reconstruction Fellow    Attending MD (Dr. Eugenio Jeffries) Attestation :  This patient was seen and evaluated by me including a history, exam, and interpretation of all imaging and/or lab data.  I formulated the treatment plan along with either a physician's assistant (MARYSOL) or training physician (resident/fellow), who also saw the patient. That individual or a scribe has documented the visit in the attached note which I approve.    Eugenio Jeffries MD, PhD     Adult Reconstruction  [facility] Department of Orthopaedic Surgery    This note was created using dictation software and may contain errors.  Please contact the creator for any clarifications that are needed.           Again, thank you for allowing me to participate in the care of your patient.        Sincerely,        Eugenio Jeffries MD    Electronically signed

## 2025-01-29 NOTE — TELEPHONE ENCOUNTER
Patient left before office could assist in scheduling STAT US.     Writer called imaging to check availability. They do have openings today in [place].     Attempted to call patient but had to leave a voicemail. Left the imaging scheduling number as call back.    Jany Alarcon, ATC

## 2025-01-30 NOTE — TELEPHONE ENCOUNTER
Upon chart review, patient is not scheduled for US until 2/14/25 which is farther out than recommended.     Called imaging to check on sooner access, and advised provider would like this done in the next day or two. The  was in understanding and cannot recall why the patient was scheduled so far out. She will contact the patient to discuss moving up the scan to an earlier date.     Staff will check back to ensure this is scheduled in appropriate timeframe.    Jany Alarcon, ATC

## 2025-01-31 ENCOUNTER — HOSPITAL ENCOUNTER (OUTPATIENT)
Dept: ULTRASOUND IMAGING | Facility: CLINIC | Age: 85
Discharge: HOME | End: 2025-01-31
Attending: STUDENT IN AN ORGANIZED HEALTH CARE EDUCATION/TRAINING PROGRAM | Admitting: STUDENT IN AN ORGANIZED HEALTH CARE EDUCATION/TRAINING PROGRAM
Payer: COMMERCIAL

## 2025-01-31 DIAGNOSIS — Z47.89 ORTHOPEDIC AFTERCARE: ICD-10-CM

## 2025-01-31 DIAGNOSIS — I82.4Z1 ACUTE DEEP VEIN THROMBOSIS (DVT) OF DISTAL END OF RIGHT LOWER EXTREMITY (H): ICD-10-CM

## 2025-01-31 DIAGNOSIS — Z96.642 STATUS POST TOTAL REPLACEMENT OF LEFT HIP: ICD-10-CM

## 2025-01-31 PROCEDURE — 93971 EXTREMITY STUDY: CPT | Mod: RT

## 2025-02-04 ENCOUNTER — THERAPY VISIT (OUTPATIENT)
Dept: PHYSICAL THERAPY | Facility: CLINIC | Age: 85
End: 2025-02-04
Payer: COMMERCIAL

## 2025-02-04 DIAGNOSIS — Z96.642 S/P TOTAL LEFT HIP ARTHROPLASTY: Primary | ICD-10-CM

## 2025-02-04 PROCEDURE — 97110 THERAPEUTIC EXERCISES: CPT | Mod: GP

## 2025-02-04 PROCEDURE — 97112 NEUROMUSCULAR REEDUCATION: CPT | Mod: GP

## 2025-02-04 ASSESSMENT — HOOS JR
LYING IN BED (TURNING OVER, MAINTAINING HIP POSITION): MILD
HOOS JR TOTAL INTERVAL SCORE: 80.56
BENDING TO THE FLOOR TO PICK UP OBJECT: MILD
GOING UP OR DOWN STAIRS: MILD

## 2025-02-04 NOTE — PROGRESS NOTES
DISCHARGE  Reason for Discharge: Pt has met or nearly met all her goals. Expect her to continue to improve with HEP completion and walking independently.     Equipment Issued: None      Discharge Plan: Patient to continue home program.    Referring Provider:  Eugenio Jeffries     02/04/25 0500   Appointment Info   Signing clinician's name / credentials Marie Salazar PT, DPT; Carolyn Hernandes, SPT   Visits Used 5   Medical Diagnosis S/P total left hip arthroplasty   PT Tx Diagnosis pain L hip   Precautions/Limitations Posterior hip precautions   Progress Note/Certification   Start of Care Date 12/23/24   Onset of illness/injury or Date of Surgery 12/13/24   Therapy Frequency 1x per week   Predicted Duration 10 weeks   Certification date from 12/23/24   Certification date to 03/03/25   Progress Note Due Date 03/03/25   [facility] Authorization Information   Condition type Initial onset (within last 3 months)   Cause of current episode Post Surgical   Type of surgery 5-Joint Replacement   Nature of treatment Rehabilitative   Documented POC (choose all that apply) Measurable short and long term/discharge treatment goals related to physical and functional deficits.;Frequency of treatment visits and treatment activities to address deficit areas.;Patient agrees to program participation including home program   How did the symptoms start with surgery on 12/13/24   General health reported by patient Fair   PT Goal 1   Goal Identifier HEP   Goal Description Patient will be consistent and independent with HEP in order to achieve functional goals.   Rationale to maximize safety and independence with performance of ADLs and functional tasks;to maximize safety and independence within the home;to maximize safety and independence within the community;to maximize safety and independence with self cares   Goal Progress met   Target Date 01/20/25   Date Met 02/04/25   PT Goal 2   Goal Identifier Walking   Goal Description Patient will tolerate  walking her dog at least 1 mile with LRAD to return to physical activity routine.   Rationale to maximize safety and independence with performance of ADLs and functional tasks;to maximize safety and independence within the home;to maximize safety and independence within the community;to maximize safety and independence with transportation;to maximize safety and independence with self cares   Goal Progress Walked dog 30 min   Target Date 03/03/25   PT Goal 3   Goal Identifier Balance   Goal Description Patient will be able to complete TUG with LRAD in <12 seconds to indicate decreased fall risk with ambulation.   Rationale to maximize safety and independence with performance of ADLs and functional tasks;to maximize safety and independence with self cares   Target Date 03/03/25   Goal Progress progressing   Subjective Report   Subjective Report Pt reporting she's seen the surgeon, went well. Feels good about this being the last PT session.   Objective Measures   Objective Measures Objective Measure 1;Objective Measure 2   Objective Measure 1   Objective Measure TUG   Details 17.86s SPC; 16.13s no AD; 15.99s SPC 2nd trial   Objective Measure 2   Objective Measure Gait speed   Details 0.75m/s regular; 0.89m/s fast   Treatment Interventions (PT)   Interventions Therapeutic Procedure/Exercise;Neuromuscular Re-education   Therapeutic Procedure/Exercise   Therapeutic Procedures: strength, endurance, ROM, flexibility minutes (89899) 10   Ther Proc 1 Edu   Ther Proc 1 - Details discussed challenge of exercises at home, removed easy items and replaced with more standing/balance per pt request   Ther Proc 2 Heel Slide and hip flexion   Ther Proc 2 - Details NT   PTRx Ther Proc 1 Ankle Flex   PTRx Ther Proc 1 - Details NT removed from HEP   PTRx Ther Proc 2 Sit to Stand   PTRx Ther Proc 2 - Details NT   PTRx Ther Proc 3 Short Arc Knee Extension   PTRx Ther Proc 3 - Details NT   PTRx Ther Proc 4 Standing Hip Flexor Stretch   PTRx  Ther Proc 4 - Details NT   PTRx Ther Proc 5 Hooklying Hip Abduction   PTRx Ther Proc 5 - Details NT   PTRx Ther Proc 6 Bridging #1   PTRx Ther Proc 6 - Details NT   PTRx Ther Proc 7 Hip Flexion Straight Leg Raise   PTRx Ther Proc 7 - Details NT   PTRx Ther Proc 8 Clamshell with Theraband   PTRx Ther Proc 8 - Details NT   Skilled Intervention Cues for form and control throughout for improved understanding of HEP, updated/finalized HEP for discharge   Patient Response/Progress tolerated progressions well   PTRx Ther Proc 9 Standing Hamstring Curl Knee Flexion   PTRx Ther Proc 9 - Details x20 at counter   Neuromuscular Re-education   Neuromuscular re-ed of mvmt, balance, coord, kinesthetic sense, posture, proprioception minutes (03953) 28   Neuromuscular Re-education Neuro Re-ed 2;Neuro Re-ed 3;Neuro Re-ed 4   Neuro Re-ed 1 weight shifting at counter   Neuro Re-ed 1 - Details NT   Neuro Re-ed 2 Balance progressions   Neuro Re-ed 2 - Details split stance 30s ea R/L, attempted SLS too hard   Neuro Re-ed 3 side stepping   Neuro Re-ed 3 - Details NT   Neuro Re-ed 4 TUG and gait speed   Neuro Re-ed 4 - Details see objective measures above   PTRx Neuro Re-ed 1 Hip AROM Standing Abduction   PTRx Neuro Re-ed 1 - Details x20 alternating; x15 R/L in a row w counter support    Skilled Intervention for improved stability   Patient Response/Progress balance improving, tolerates activities well, no seated rest breaks required this date   PTRx Neuro Re-ed 2 Semi-Tandem Stance   PTRx Neuro Re-ed 2 - Details x30s R/L    Education   Learner/Method Patient;Family;Demonstration;No Barriers to Learning;Pictures/Video   Education Comments progression of HEP   Plan   Home program Ptrx HEP   Plan for next session DC   Total Session Time   Timed Code Treatment Minutes 38   Total Treatment Time (sum of timed and untimed services) 38

## 2025-02-13 ENCOUNTER — VIRTUAL VISIT (OUTPATIENT)
Facility: CLINIC | Age: 85
End: 2025-02-13
Payer: COMMERCIAL

## 2025-02-13 DIAGNOSIS — F33.1 MODERATE EPISODE OF RECURRENT MAJOR DEPRESSIVE DISORDER (H): Primary | ICD-10-CM

## 2025-02-13 PROCEDURE — 90834 PSYTX W PT 45 MINUTES: CPT | Mod: 95

## 2025-02-13 NOTE — PROGRESS NOTES
[facility]                                     Progress Note    Patient Name: Ginger Marshall  Date: 2/13/25         Service Type: Individual      Session Start Time: 9:00 am  Session End Time: 9:40 am     Session Length: 40 min    Session #: 23    Answers submitted by the patient for this visit:    Attendees: Client attended alone    Service Modality:  Video Visit:      Provider verified identity through the following two step process.  Patient provided:  Patient is known previously to provider    Telemedicine Visit: The patient's condition can be safely assessed and treated via synchronous audio and visual telemedicine encounter.      Reason for Telemedicine Visit: Patient convenience (e.g. access to timely appointments / distance to available provider)    Originating Site (Patient Location): Patient's home    Distant Site (Provider Location): Provider Remote Setting- Home Office    Consent:  The patient/guardian has verbally consented to: the potential risks and benefits of telemedicine (video visit) versus in person care; bill my insurance or make self-payment for services provided; and responsibility for payment of non-covered services.     Patient would like the video invitation sent by:  My Chart    Mode of Communication:  Video Conference via [facility]    Distant Location (Provider):  Off-site    As the provider I attest to compliance with applicable laws and regulations related to telemedicine.    DATA  Interactive Complexity: No     Crisis: No   Progress Since Last Session (Related to Symptoms / Goals / Homework):   Symptoms: Improving mood , low energy    Homework: Partially completed      Episode of Care Goals: Minimal progress - ACTION (Actively working towards change); Intervened by reinforcing change plan / affirming steps taken     Current / Ongoing Stressors and Concerns:  Continuing pain. Daughter providing in home care. Receiving [facility] resources for support, impatience with  responses. Low quality sleep, stopped using CPAP.   Ongoing: Grieving loss of adult son who was blind and lived alone, fell and passed away, second loss of an adult/child. Regrets about parenting.      Treatment Objective(s) Addressed in This Session:    Practice boundaries and radical acceptance of reality regarding sister, sons, reality of accident  Patient will Increase interest, engagement, and pleasure in doing things  Decrease frequency and intensity of feeling down, depressed, hopeless  Improve quantity and quality of night time sleep / decrease daytime naps  Feel less tired and more energy during the day   Improve diet, appetite, mindful eating, and / or meal planning  Identify negative self-talk and behaviors: challenge core beliefs, myths, and actions  Improve concentration, focus, and mindfulness in daily activities   Feel less fidgety, restless or slow in daily activities / interpersonal interactions.     Intervention:  Supportive therapy: Provided active listening and validation. Reviewed grounding/mindfulness skills, coping skills, support seeking actions, acceptance of reality, conceptualized strategies for improving reading experience  EMDR: Reinforced calm safe state practice    Motivational Interviewing-MAINTAIN  Target Behavior:  radical acceptance of reality, gratitude , jena practices  Proactive communication with providers, county resources, attend appointments, pursue injectable medication for improved pain management    MI Intervention: Expressed Empathy/Understanding, Supported Autonomy, Collaboration, Evocation and Open-ended questions     Change Talk Expressed by the Patient: Desire to change Ability to change Reasons to change Activation Taking steps    Provider Response to Change Talk: E - Evoked more info from patient about behavior change, A - Affirmed patient's thoughts, decisions, or attempts at behavior change, and R - Reflected patient's change talk    Assessments completed prior  to visit:  LAURA  12/5/22  The following assessments were completed by patient for this visit:  PHQ2:       2/27/2023     8:50 AM 2/24/2023     6:48 AM 2/20/2023     5:39 PM 1/15/2023    11:54 AM 11/16/2022     1:33 PM 8/5/2022     8:11 AM 7/6/2022     4:18 PM   PHQ-2 ( 1999 [employer])   Q1: Little interest or pleasure in doing things 0 1 1 1 1  3    Q2: Feeling down, depressed or hopeless 0 1 1 1 1  0    PHQ-2 Score 0 2 2 2 2 3    Q1: Little interest or pleasure in doing things  Several days Several days Several days Several days Nearly every day    Q2: Feeling down, depressed or hopeless  Several days Several days Several days Several days Not at all    PHQ-2 Score  2 2 2 2 3 Incomplete       Proxy-reported     PHQ9:       7/7/2024     7:30 AM 8/12/2024     1:53 PM 8/19/2024     9:41 AM 9/27/2024     8:38 AM 10/7/2024     7:09 PM 11/18/2024     9:10 PM 1/15/2025    12:07 PM   PHQ-9 SCORE   PHQ-9 Total Score MyChart 6 (Mild depression) 10 (Moderate depression) 11 (Moderate depression)  3 (Minimal depression) 5 (Mild depression) 6 (Mild depression)   PHQ-9 Total Score 6 10 11 8 3 5  6        Patient-reported     Patient Health Questionnaire (Submitted on 9/26/2023)  If you checked off any problems, how difficult have these problems made it for you to do your work, take care of things at home, or get along with other people?: Somewhat difficult  PHQ9 TOTAL SCORE: 4  GAD2:       3/13/2024     6:28 PM 4/21/2024     1:25 PM 7/7/2024     7:31 AM 8/19/2024     9:42 AM 10/4/2024     6:36 AM 11/18/2024     9:10 PM 1/15/2025    12:08 PM   SPRING-2   Feeling nervous, anxious, or on edge 1 0 0 0 0 0 0   Not being able to stop or control worrying 0 0 0 0 0 0 0   SPRING-2 Total Score 1 0 0 0 0 0  0        Patient-reported     GAD7:       7/6/2022     4:18 PM 8/5/2022     8:11 AM 11/16/2022     1:33 PM 6/5/2023     2:31 PM 2/28/2024    10:14 AM 4/2/2024     9:05 PM 5/31/2024     9:14 AM   SPRING-7 SCORE   Total Score 2 (minimal anxiety) 5 (mild  anxiety) 2 (minimal anxiety)  5 (mild anxiety) 1 (minimal anxiety) 1 (minimal anxiety)   Total Score 2 5 2 3 5 1 1     CAGE-AID:       12/5/2022     9:28 AM   CAGE-AID Total Score   Total Score 0   Total Score MyChart 0 (A total score of 2 or greater is considered clinically significant)     PROMIS 10-Global Health (only subscores and total score):       8/23/2023     8:34 AM 12/14/2023     6:28 AM 3/13/2024     6:31 PM 7/7/2024     7:34 AM 9/1/2024    11:20 AM 1/11/2025    10:48 AM 1/24/2025     8:25 AM   PROMIS-10 Scores Only   Global Mental Health Score 12 13 11 13 10 9  14    Global Physical Health Score 12 12 11 12 9 11  12    PROMIS TOTAL - SUBSCORES 24 25 22 25 19 20  26        Patient-reported     [place] Suicide Severity Rating Scale (Lifetime/Recent)      12/5/2022    11:00 AM 11/15/2024    11:11 AM 12/13/2024    10:53 AM   [place] Suicide Severity Rating (Lifetime/Recent)   Q1 Wished to be Dead (Past Month) no 0-->no 0-->no   Q2 Suicidal Thoughts (Past Month) no 0-->no 0-->no   Q3 Suicidal Thought Method no     Q4 Suicidal Intent without Specific Plan no     Q5 Suicide Intent with Specific Plan no     Q6 Suicide Behavior (Lifetime) no 0-->no 0-->no   Level of Risk per Screen low risk no risks indicated no risks indicated         ASSESSMENT: Current Emotional / Mental Status (status of significant symptoms):   Risk status (Self / Other harm or suicidal ideation)   Patient denies current fears or concerns for personal safety.   Patient denies current or recent suicidal ideation or behaviors.   Patient denies current or recent homicidal ideation or behaviors.   Patient denies current or recent self injurious behavior or ideation.   Patient denies other safety concerns.   Patient reports there has been no change in risk factors since their last session.     Patient reports there has been no change in protective factors since their last session.     Recommended that patient call 911 or go to the local ED  should there be a change in any of these risk factors     Appearance:   Appropriate    Eye Contact:   Good    Psychomotor Behavior: Normal    Attitude:   Pleasant Attentive   Orientation:   All   Speech    Rate / Production: Normal/ Responsive Normal     Volume:  Normal    Mood:    Anxious  depressed   Affect:    Appropriate    Thought Content:  Clear    Thought Form:  Coherent  Logical    Insight:    Fair      Medication Review:   No changes to current psychiatric medication(s)     Medication Compliance:   Yes     Changes in Health Issues:   None reported     Chemical Use Review:   Substance Use: Chemical use reviewed, no active concerns identified      Tobacco Use: No current tobacco use.      Diagnosis:  1. Moderate episode of recurrent major depressive disorder (H)          Collateral Reports Completed:   Not Applicable    PLAN: (Patient Tasks / Therapist Tasks / Other)   Embrace radical acceptance and boundaries. Use proactive communication with providers, family, try grounding /mindfulness, loving kindness toward self, use EMDR cue word CHAIR.Engage with groups as possible.    Rocío Arenas, [facility] 2/13/2025                                                                                                  Individual Treatment Plan    Patient's Name: Ginger Marshall  YOB: 1940    Date of Creation: 2/8/23  Date Treatment Plan Last Reviewed/Revised:   8/19/24    DSM5 Diagnoses: 296.31 (F33.0) Major Depressive Disorder, Recurrent Episode, Mild With anxious distress  Psychosocial / Contextual Factors: aging, losses, generational trauma  PROMIS (reviewed every 90 days):   21 12/5/22    Referral / Collaboration:  Referral to another professional/service is not indicated at this time..    Anticipated number of session for this episode of care: 6-9 sessions  Anticipation frequency of session: Every other week  Anticipated Duration of each session: 53 or more minutes  Treatment plan will be reviewed in 90  days or when goals have been changed.     MeasurableTreatment Goal(s) related to diagnosis / functional impairment(s)  Goal 1: Patient will experience an improvement in mood and functioning as evidenced by assessment scores, self report and clinician observation    I will know I've met my goal when things feel better (paraphrase).      Objective #A (Patient Action)    Patient will increase understanding of steps in the grief process   Talk to others about losses  Use prayer practices and jena community to support well being.   Practice boundaries and radical acceptance of reality regarding sister sons, reality of accident   Engage in social activities at [facility]  Status: 8/19/24    Intervention(s)  Therapist will teach CBT, DBT  and support grief processing skills, prayer practices .    Objective #B  Patient will Increase interest, engagement, and pleasure in doing things  Decrease frequency and intensity of feeling down, depressed, hopeless  Improve quantity and quality of night time sleep / decrease daytime naps  Feel less tired and more energy during the day   Improve diet, appetite, mindful eating, and / or meal planning  Identify negative self-talk and behaviors: challenge core beliefs, myths, and actions  Improve concentration, focus, and mindfulness in daily activities   Feel less fidgety, restless or slow in daily activities / interpersonal interactions.  Status: 8/19/2024    Intervention(s)  Therapist will  teach cbt, dbt,MI, mindfulness and behavioral activation and assign homework .      Patient has reviewed and agreed to the above plan.      HECTOR Springer  8/19/2024

## 2025-02-17 DIAGNOSIS — E78.5 HYPERLIPIDEMIA LDL GOAL <100: Chronic | ICD-10-CM

## 2025-02-17 DIAGNOSIS — A60.04 HERPES SIMPLEX VULVOVAGINITIS: ICD-10-CM

## 2025-02-17 RX ORDER — VALACYCLOVIR HYDROCHLORIDE 500 MG/1
500 TABLET, FILM COATED ORAL 2 TIMES DAILY
Qty: 180 TABLET | Refills: 0 | Status: SHIPPED | OUTPATIENT
Start: 2025-02-17

## 2025-02-17 RX ORDER — ATORVASTATIN CALCIUM 40 MG/1
40 TABLET, FILM COATED ORAL DAILY
Qty: 90 TABLET | Refills: 0 | Status: SHIPPED | OUTPATIENT
Start: 2025-02-17

## 2025-03-03 ENCOUNTER — LAB (OUTPATIENT)
Dept: LAB | Facility: CLINIC | Age: 85
End: 2025-03-03
Payer: COMMERCIAL

## 2025-03-03 DIAGNOSIS — Z79.899 HIGH RISK MEDICATION USE: ICD-10-CM

## 2025-03-03 DIAGNOSIS — M81.0 OSTEOPOROSIS, UNSPECIFIED OSTEOPOROSIS TYPE, UNSPECIFIED PATHOLOGICAL FRACTURE PRESENCE: ICD-10-CM

## 2025-03-03 DIAGNOSIS — M05.79 RHEUMATOID ARTHRITIS INVOLVING MULTIPLE SITES WITH POSITIVE RHEUMATOID FACTOR (H): ICD-10-CM

## 2025-03-03 LAB
ALBUMIN SERPL BCG-MCNC: 4.3 G/DL (ref 3.5–5.2)
ALP SERPL-CCNC: 42 U/L (ref 40–150)
ALT SERPL W P-5'-P-CCNC: 16 U/L (ref 0–50)
AST SERPL W P-5'-P-CCNC: 26 U/L (ref 0–45)
BASOPHILS # BLD AUTO: 0 10E3/UL (ref 0–0.2)
BASOPHILS NFR BLD AUTO: 0 %
BILIRUB DIRECT SERPL-MCNC: 0.18 MG/DL (ref 0–0.3)
BILIRUB SERPL-MCNC: 0.4 MG/DL
CALCIUM SERPL-MCNC: 9.8 MG/DL (ref 8.8–10.4)
CHOLEST SERPL-MCNC: 175 MG/DL
CREAT SERPL-MCNC: 0.68 MG/DL (ref 0.51–0.95)
CRP SERPL-MCNC: <3 MG/L
EGFRCR SERPLBLD CKD-EPI 2021: 86 ML/MIN/1.73M2
EOSINOPHIL # BLD AUTO: 0.2 10E3/UL (ref 0–0.7)
EOSINOPHIL NFR BLD AUTO: 3 %
ERYTHROCYTE [DISTWIDTH] IN BLOOD BY AUTOMATED COUNT: 14.5 % (ref 10–15)
ERYTHROCYTE [SEDIMENTATION RATE] IN BLOOD BY WESTERGREN METHOD: 28 MM/HR (ref 0–30)
FASTING STATUS PATIENT QL REPORTED: NO
HCT VFR BLD AUTO: 36 % (ref 35–47)
HDLC SERPL-MCNC: 82 MG/DL
HGB BLD-MCNC: 11.7 G/DL (ref 11.7–15.7)
IMM GRANULOCYTES # BLD: 0 10E3/UL
IMM GRANULOCYTES NFR BLD: 0 %
LDLC SERPL CALC-MCNC: 63 MG/DL
LYMPHOCYTES # BLD AUTO: 1.2 10E3/UL (ref 0.8–5.3)
LYMPHOCYTES NFR BLD AUTO: 21 %
MCH RBC QN AUTO: 32.7 PG (ref 26.5–33)
MCHC RBC AUTO-ENTMCNC: 32.5 G/DL (ref 31.5–36.5)
MCV RBC AUTO: 101 FL (ref 78–100)
MONOCYTES # BLD AUTO: 0.6 10E3/UL (ref 0–1.3)
MONOCYTES NFR BLD AUTO: 12 %
NEUTROPHILS # BLD AUTO: 3.4 10E3/UL (ref 1.6–8.3)
NEUTROPHILS NFR BLD AUTO: 63 %
NONHDLC SERPL-MCNC: 93 MG/DL
PLATELET # BLD AUTO: 351 10E3/UL (ref 150–450)
PROT SERPL-MCNC: 7.5 G/DL (ref 6.4–8.3)
RBC # BLD AUTO: 3.58 10E6/UL (ref 3.8–5.2)
TRIGL SERPL-MCNC: 149 MG/DL
VIT D+METAB SERPL-MCNC: 42 NG/ML (ref 20–50)
WBC # BLD AUTO: 5.4 10E3/UL (ref 4–11)

## 2025-03-07 ENCOUNTER — ANCILLARY PROCEDURE (OUTPATIENT)
Dept: GENERAL RADIOLOGY | Facility: CLINIC | Age: 85
End: 2025-03-07
Attending: STUDENT IN AN ORGANIZED HEALTH CARE EDUCATION/TRAINING PROGRAM
Payer: COMMERCIAL

## 2025-03-07 DIAGNOSIS — M25.471 ANKLE SWELLING, RIGHT: ICD-10-CM

## 2025-03-07 PROBLEM — D75.89 MACROCYTOSIS WITHOUT ANEMIA: Status: ACTIVE | Noted: 2025-03-07

## 2025-03-07 PROCEDURE — 73610 X-RAY EXAM OF ANKLE: CPT | Mod: TC | Performed by: RADIOLOGY

## 2025-03-10 ENCOUNTER — OFFICE VISIT (OUTPATIENT)
Dept: RHEUMATOLOGY | Facility: CLINIC | Age: 85
End: 2025-03-10
Payer: COMMERCIAL

## 2025-03-10 VITALS
RESPIRATION RATE: 16 BRPM | SYSTOLIC BLOOD PRESSURE: 149 MMHG | WEIGHT: 205 LBS | BODY MASS INDEX: 32.95 KG/M2 | DIASTOLIC BLOOD PRESSURE: 67 MMHG | HEIGHT: 66 IN | HEART RATE: 79 BPM | OXYGEN SATURATION: 94 %

## 2025-03-10 DIAGNOSIS — Z79.899 HIGH RISK MEDICATION USE: ICD-10-CM

## 2025-03-10 DIAGNOSIS — M05.79 RHEUMATOID ARTHRITIS INVOLVING MULTIPLE SITES WITH POSITIVE RHEUMATOID FACTOR (H): Primary | ICD-10-CM

## 2025-03-10 PROCEDURE — 1126F AMNT PAIN NOTED NONE PRSNT: CPT | Performed by: INTERNAL MEDICINE

## 2025-03-10 PROCEDURE — 3077F SYST BP >= 140 MM HG: CPT | Performed by: INTERNAL MEDICINE

## 2025-03-10 PROCEDURE — 99214 OFFICE O/P EST MOD 30 MIN: CPT | Performed by: INTERNAL MEDICINE

## 2025-03-10 PROCEDURE — 3078F DIAST BP <80 MM HG: CPT | Performed by: INTERNAL MEDICINE

## 2025-03-10 PROCEDURE — G2211 COMPLEX E/M VISIT ADD ON: HCPCS | Performed by: INTERNAL MEDICINE

## 2025-03-10 RX ORDER — METHOTREXATE 25 MG/.5ML
25 INJECTION, SOLUTION SUBCUTANEOUS
Qty: 2 ML | Refills: 9 | Status: SHIPPED | OUTPATIENT
Start: 2025-03-10

## 2025-03-10 RX ORDER — UPADACITINIB 15 MG/1
15 TABLET, EXTENDED RELEASE ORAL DAILY
Qty: 30 TABLET | Refills: 9 | Status: SHIPPED | OUTPATIENT
Start: 2025-03-10

## 2025-03-10 RX ORDER — LEUCOVORIN CALCIUM 5 MG/1
5 TABLET ORAL
Qty: 13 TABLET | Refills: 2 | Status: SHIPPED | OUTPATIENT
Start: 2025-03-10

## 2025-03-10 ASSESSMENT — PAIN SCALES - GENERAL: PAINLEVEL_OUTOF10: NO PAIN (0)

## 2025-03-10 NOTE — NURSING NOTE
RAPID3 (0-30) Cumulative Score  3.8          RAPID3 Weighted Score (divide #4 by 3 and that is the weighted score)  1.26

## 2025-03-10 NOTE — PROGRESS NOTES
Rheumatology Clinic Visit      Ginger Marshall MRN# [id number]   YOB: 1940 Age: 84 year old      Date of visit: 3/10/25   PCP: Dr. Duy Leyva    Chief Complaint   Patient presents with:  RECHECK: RA    Assessment and Plan     1. Seropositive Erosive Rheumatoid Arthritis ( [2009], CCP >100 [2009]; hx of rheumatoid nodule by 6/14/2011 pathology): Previously failed remicade (staph infection during therapy, but was immediately after a joint injection), orencia (migraines), HCQ (ineffective), Xeljanz (partially effective).  Sulfa allergy.  Currently on methotrexate 25 mg SQ once weekly (dose reduction in the past resulted in more symptoms), leucovorin 5 mg weekly (resolved nasal sore that didn't improve with higher daily folic acid doses), and Rinvoq 15mg daily.  RA well controlled since changing to Rinvoq.   In the future, if changes needed, she will consider changing to Simponi IV or SQ.  Chronic illness.    - Continue methotrexate 25mg SQ once weekly (Rasuvo)  - Continue leucovorin 5 mg every 7 days, 24 hours after MTX dose.   - Continue Rinvoq 15 mg daily  - Labs in 3 months: CBC, Creatinine, Hepatic Panel, ESR, CRP, QuantiFERON-TB gold plus    High risk medication requiring intensive toxicity monitoring at least quarterly    2. 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  Doing okay at this time.     3. Right hip pain: Status post WALTER twice in the past. [place] [facility] [place] needed.  Symptoms are degenerative in nature.  Doing  well at this time per patient    4.  Left hip WALTER on 12/13/2024: Recovering.  Reviewed perioperative DMARD management; she has not to restart Rinvoq until 14 days after surgery date and only in the absence of infection, delayed wound healing, and after being given approval to restart by her surgeon.    5. Degenerative change of the L-spine that radiates to the left leg: Hx of L-spine surgery by  Dr. Michele who is now at [facility].  Has been seen at [facility] and [place] [facility].  She previously reported that a TENS unit was helpful at one point, then not helpful.   Now following in the pain clinic where steroid injection was helpful.    6.  Thoracic back pain: Degenerative in nature.  And degenerative changes seen on 2021 chest CT.  Continue physical therapy exercises at home, as these are helpful.    7.  Osteoporosis: was previously managed by endocrinology and she received reclast once in 2013, 2014, 2016. 12/12/2018 DEXA ordered by Dr. Vázquez showed osteoporosis.  Repeat DEXA on 2/2/2022 showed osteoporosis; no significant change of the hips; forearm osteoporosis but no previous evaluation of the forearm for comparison.  Reclast was restarted after sufficient drug holiday, with the first dose on 3/18/2021; again received in 3/21/2022, 3/21/2023, 3/25/2024.  2/6/2024 DEXA was okay but did not include the forearm; plan for 1 more dose of Reclast in 2025 and then a drug holiday.  Chronic illness.      - Reclast once yearly; next due on 3/25/2025 or later; phone number provided so that she may call to schedule  - Continue vitamin D 1000 IU daily  - Continue calcium 1200mg daily from dietary sources; no other supplementation     8.  History of left 1st toe pain: 2 episodes of sudden onset left toe pain followed by diffuse left foot pain that made ambulation difficult.  Also with nodules over both Achilles tendons and the right elbow that are either rheumatoid nodules or tophi.  She reports having history of gout decades ago.  Denies ever being on colchicine or allopurinol.  Has colchicine to use if suspected gout flare occurs.  No flares since last seen so has not used colchicine.  - Colchicine: (MITIGARE) 0.6 MG capsule; Take 2 capsules (1.2 mg) by mouth once for 1 dose at the onset of a gout flare, followed by 1 capsule (0.6mg) 1 hour later.      9.  Right first MTP pain,  osteoarthritis: History of right toe fracture that has since healed nonsurgically per patient.  Also with large sesamoid bone and pain on the plantar aspect of the first MTP with ambulation.  I believe that the pain she is experiencing is from the large sesamoid bone and degenerative arthritis of the first MTP joint.  She has already seen podiatry who offered surgery and she would like to consider moving forward with surgery but reportedly her podiatrist has since retired so she will call to schedule with another podiatrist in the same office.    9.  Chronic pain syndrome: Documented here for historical significance only.   Management per pain clinic and/or PCP    11.  Vaccinations: Vaccinations reviewed with Ms. Marshall.  Risks and benefits of vaccinations were discussed.    - Influenza: encouraged yearly vaccination, and to hold methotrexate for 1-2 weeks afterward  - [id number]: up to date  - [id number]: up to date  - Shingrix: Up to date   - COVID-19: Advised keeping updated, and to hold methotrexate and Rinvoq for 1-2 weeks afterward.  - RSV: Advised vaccination    12.  Historical: Nasal sores: Only occurs between December-March each year.  Reports using a humidifier on her CPAP.  Has seen ENT but was determined that it was likely vasomotor rhinitis with constant nasal drip that irritates the nose.  She is advised by ENT to use a humidifier at night and to use Atrovent nasal spray.  She will follow with ENT as needed for this issue.  This is documented here for historical significance only.    13. Elevated blood pressure:  Ginger to follow up with Primary Care provider regarding elevated blood pressure.      Total minutes spent in evaluation with patient, documentation, , and review of pertinent studies and chart notes: 22  The longitudinal plan of care for the rheumatology problem(s) were addressed during this visit.  Due to added complexity of care, we will continue to support the patient and the  subsequent management of this condition with ongoing continuity of care.    Ms. Marshall verbalized agreement with and understanding of the rational for the diagnosis and treatment plan.  All questions were answered to best of my ability and the patient's satisfaction. Ms. Marshall was advised to contact the clinic with any questions that may arise after the clinic visit.      Thank you for involving me in the care of the patient    Return to clinic: 3 months    HPI   Ginger Marshall is a 84 year old female with a history of multiple foot surgeries, hand tendon transfers, MCP replacements (most recent being in August 2015), bilateral TKA, bilateral WALTER, left distal radius fracture history, gout, s/p lumbar fusion, obstructive sleep apnea and uses a CPAP, osteoporosis and seropositive (RF+, CCP+) erosive rheumatoid arthritis who presents for follow-up of rheumatoid arthritis.      2/6/2024: RA controlled.  Nasal sores each year between December-March.  Uses a humidifier on her CPAP.  Has not noticed any change with methotrexate use.  No oral sores.  Chronic low back pain that radiates to the left leg; she would like to be evaluated in the pain clinic; she does not want to return to the spine surgeon because she does not want surgery.  He does not want to try reducing methotrexate because she recalls having worsening joint symptoms with dose reduction in the past.    4/4/2024: Here sooner than previously scheduled because she would like to discuss alternatives to Rinvoq considering history of recurrent shingles.  She is now taking Valtrex daily for suppressive benefits and has not had shingles since being on Valtrex.  She is not interested in changing medications at this time because she does not want to risk potentially having worsening joint pain; she says that her RA is well-controlled at this time.    5/17/2024: joint pain with weather changes; affecting the legs and low back.  States that she had injections with the  pain clinic  without improvement.  Fell when getting out of her recliner; left ankle is swollen but not painful and she says it is already improving so she is going to monitor it but if it worsens or becomes painful then she will go into the clinic to have it evaluated.  Felt better when she was doing physical therapy exercises for her back but she stopped doing physical therapy exercises for her back on her own and her back pain has worsened.  She would like to go to the pain clinic and physical therapy.  She would like a lift chair because she has difficulty getting out of her recliner; she says she spoke with her primary care provider about this but there is no action taken.    9/10/2024: RA controlled.  Went to the pain clinic where steroid injection of her back was very effective; but then she noticed that her left hip was very painful so she is planning for left WALTER soon.  Other than pain of the left hip at this time she has no other joint pain.  Morning stiffness for less than 20 minutes.  No joint swelling.    12/20/2024: Left WALTER about 1 week ago and recovering; still some pain but improving over time.  RA controlled.  Does not plan to restart Rinvoq until her surgeon gives her the okay to do so, and being at least 14 days postop.  Left shoulder pain and anticipates shoulder replacement surgery in the future.  Some tenderness on the nasal mucosa that has not gone away since she was last seen by ENT so she plans to follow-up with ENT.    Today, 3/10/2025: Currently doing well with regard to rheumatoid arthritis.  Only pain she has is degenerative at the right first MTP on the plantar aspect where she has a large bone and has seen podiatry who offered surgery but then the podiatrist retired.  She plans to call that clinic to schedule with a new podiatrist.  No rashes.  No oral or nasal sores.  No stomach upset.     Denies fevers, chills, nausea, vomiting, constipation, diarrhea. No abdominal pain. No chest  pain/pressure, palpitations, or shortness of breath.     Tobacco: quit in 1999  EtOH: 1 glass of wine every month at most  Drugs: None  Occupation: , retired     ROS   12 point review of system was completed and negative except as noted in the HPI     Active Problem List     Patient Active Problem List   Diagnosis    History of colonic polyps    Multinodular goiter    Pulmonary nodule    PSEUDOPHAKIA OU    PVD (POSTERIOR VITREOUS DETACHMENT) OU    PXF (PSEUDOEXFOLIATION OF LENS CAPSULE) OD    GERD (gastroesophageal reflux disease)    Hyperlipidemia LDL goal <100    Peripheral neuropathy    Sleep apnea    Anemia of chronic disease    Osteoporosis    Cornea guttata, ou    Conjunctival concretions    Stenosis, spinal, lumbar    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    MGD (meibomian gland dysfunction)    Prediabetes    Allergic state, subsequent encounter    Anxiety    DDD (degenerative disc disease), lumbar    Chronic right shoulder pain    Moderate episode of recurrent major depressive disorder (H)    Rheumatic mitral stenosis    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    History of bisphosphonate therapy    Morbid obesity (H)    Immunocompromised    Thoracic spine pain    Herpes simplex vulvovaginitis    Lumbosacral radiculopathy    Lumbosacral spondylosis without myelopathy    Muscle pain    Muscle weakness (generalized)    Other abnormalities of gait and mobility    Post-laminectomy syndrome    Rheumatoid arthritis with rheumatoid factor, unspecified (H)    S/P left rotator cuff repair    Osteoarthritis of left glenohumeral joint    Tendinitis of left rotator cuff    S/P total hip arthroplasty    Macrocytosis without anemia     Past Medical History     Past Medical History:   Diagnosis Date    Acute posthemorrhagic anemia 10/13/2012    Closed fracture of multiple ribs of left side, initial encounter 11/25/2019    Closed fracture of olecranon process  of right ulna with routine healing 2023    Closed fracture of shaft of left ulna, unspecified fracture morphology, initial encounter 2023    COPD (chronic obstructive pulmonary disease) (H)     no longer occurring    Depressive disorder     Ex-smoker 1999    Gastroenteritis 2020    Gastroenteritis with norovirus    Herniated nucleus pulposus, L3-4 2017    Hip joint replacement by other means 07/10/2008    History of blood transfusion     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    History of total knee replacement 2009    Menopause 1989    late 40's    Migraine 2014    resolved    Multinodular goiter     Other chronic pain     joints    Other closed intra-articular fracture of distal end of left radius, initial encounter 2023    Pelvic fracture (H) 2014    Rheumatic mitral stenosis     Rheumatoid arteritis (H)     S/P lumbar fusion 2017    Sleep apnea     uses cpap    Vitamin B12 deficiency      Past Surgical History     Past Surgical History:   Procedure Laterality Date    ABDOMEN SURGERY      c sections    ARTHROPLASTY HIP Left 2024    Procedure: Left hybrid total hip arthroplasty;  Surgeon: Eugenio Jeffries MD;  Location:  OR    ARTHROSCOPY KNEE Left     ARTHROSCOPY KNEE RT/LT  2006    left    BACK SURGERY  2013    disc    BIOPSY BREAST      BREAST SURGERY      lumpectomy 's    BREAST SURGERY      lumpectomy    CATARACT EXTRACTION Bilateral     CATARACT IOL, RT/LT Bilateral 2010 aproximately     SECTION  1966     SECTION  1972    COLONOSCOPY  2009,    COLONOSCOPY      FINGER SURGERY Right 2019    Procedure: DISTAL ULNA RESECTION, RIGHT MIDDLE SILASTIC METACARPALPHALANGEAL EXCHANGE AND RIGHT ELBOW NODULE EXCISION.;  Surgeon: Hilario Redmond MD;  Location: [facility] OR;  Service: Orthopedics    FOOT SURGERY Left     GYN SURGERY  66,74    HAND SURGERY Right     HAND SURGERY Right       "ESOPH/GAS REFLUX TEST W NASAL IMPED >1 HR  02/01/2012    Procedure:ESOPHAGEAL IMPEDENCE FUNCTION TEST WITH 24 HOUR PH GREATER THAN 1 HOUR; Surgeon:KAYKAY JULIO; Location:[facility]    IR LUMBAR EPIDURAL STEROID INJECTION      IR TRANSLAMINAR EPIDURAL LUMBAR INJ INCL IMAGING  05/02/2012    [facility] L5-S1 [facility]    LUMBAR FUSION      OPEN REDUCTION INTERNAL FIXATION ELBOW Right 2/12/2023    Procedure: OPEN REDUCTION INTERNAL FIXATION, RIGHT OLECRANON FRACTURE;  Surgeon: Pili Brock MD;  Location:  OR    OPEN REDUCTION INTERNAL FIXATION WRIST Left 2/12/2023    Procedure: OPEN REDUCTION INTERNAL FIXATION, LEFT DISTAL RADIUS FRACTURE;  Surgeon: Pili Brock MD;  Location:  OR    OPTICAL TRACKING SYSTEM FUSION POSTERIOR SPINE LUMBAR N/A 04/03/2017    Procedure: OPTICAL TRACKING SYSTEM FUSION SPINE POSTERIOR LUMBAR ONE LEVEL;  Surgeon: Anthony Michele MD;  Location:  OR    ORTHOPEDIC SURGERY  1991    left foot surgery    ORTHOPEDIC SURGERY  1995    R MCP surgery    ORTHOPEDIC SURGERY  2007    right knee total replacement    TOTAL HIP ARTHROPLASTY Right     TOTAL KNEE ARTHROPLASTY Left     TOTAL KNEE ARTHROPLASTY Right     ZZC ANESTH,TOTAL HIP ARTHROPLASTY  2010    Rt hip    ZZC HAND/FINGER SURGERY UNLISTED  11/2005    right hand    ZZC TOTAL KNEE ARTHROPLASTY  2006    left     Allergy     Allergies   Allergen Reactions    Abatacept Other (See Comments)     Severe headaches  Migraine    Celebrex [Roche-2 Inhibitors (Sulfonamide)]      Ineffective    Celecoxib Unknown and Nausea    Levaquin [Levofloxacin] Fatigue     Severe body pain    Orencia [Abatacept]      Headache    Septra [Bactrim] Unknown     \"Anything with sulfa in it don't come near me\"    Sulfa Antibiotics Nausea and Vomiting     \"deathly ill\"  Allergic to everything with Sulfa in it.    Sulfamethoxazole-Trimethoprim Nausea and Nausea and Vomiting    Tramadol Other (See Comments)     Headache  Migraine headache    Valdecoxib " "Unknown and Nausea     Made me \"very very ill\", might of been \"cramping\"    Adhesive Tape Rash     Plastic tape  Plastic tapes;  Pt states she has tolerated tegaderm for IV sites as of 12/13/2024    Antihistamines, Chlorpheniramine-Type  [Antihistamines, Chlorpheniramine-Type] Anxiety and Other (See Comments)     Current Medication List     Current Outpatient Medications   Medication Sig Dispense Refill    acetaminophen (TYLENOL) 325 MG tablet Take 2 tablets (650 mg) by mouth every 4 hours as needed for other (mild pain). 100 tablet 0    albuterol (PROAIR HFA/PROVENTIL HFA/VENTOLIN HFA) 108 (90 Base) MCG/ACT inhaler Inhale 2 puffs into the lungs every 6 hours as needed for shortness of breath, wheezing or cough      atorvastatin (LIPITOR) 40 MG tablet Take 1 tablet (40 mg) by mouth daily. 90 tablet 3    buPROPion (WELLBUTRIN XL) 150 MG 24 hr tablet Take 1 tablet (150 mg) by mouth every morning. 90 tablet 3    CALCIUM CITRATE PO Take 300 mg by mouth daily Take with meal that contains least amount of calcium      carboxymethylcellulose PF (REFRESH PLUS) 0.5 % ophthalmic solution Place 1 drop into both eyes 2 times daily as needed for dry eyes      Cholecalciferol (VITAMIN D-3 PO) Take 1,000 Units by mouth daily.      Ferrous Gluconate 324 (37.5 Fe) MG TABS Take 1 tablet by mouth daily Started 6/2023      ipratropium (ATROVENT) 0.06 % nasal spray Spray 2 sprays into both nostrils 4 times daily as needed for rhinitis (nasal drainage) 15 mL 11    leucovorin (WELLCOVORIN) 5 MG tablet Take 1 tablet (5 mg) by mouth every 7 days . Take 24 hours after taking Methotrexate each week. 13 tablet 2    loratadine (CLARITIN) 10 MG tablet Take 1 tablet (10 mg) by mouth 2 times daily      Methotrexate, PF, (RASUVO) 25 MG/0.5ML autoinjector Inject 0.5 mLs (25 mg) subcutaneously every 7 days. . Hold for signs of infection, and seek medical attention. Take every Thursday. 2 mL 9    olopatadine (PATADAY) 0.2 % ophthalmic solution Place 1 " drop into both eyes daily as needed Uses mostly in Spring      pantoprazole (PROTONIX) 40 MG EC tablet Take 1 tablet (40 mg) by mouth daily. 90 tablet 3    pregabalin (LYRICA) 50 MG capsule Take 1 capsule (50 mg) by mouth 3 times daily 270 capsule 1    sertraline (ZOLOFT) 100 MG tablet Take 1 tablet (100 mg) by mouth daily. 90 tablet 3    UNABLE TO FIND MEDICATION NAME: Distilled water for CPAP      upadacitinib ER (RINVOQ) 15 MG tablet Take 1 tablet (15 mg) by mouth daily. . Hold for any infection and seek medical attention. 30 tablet 9    valACYclovir (VALTREX) 500 MG tablet Take 1 tablet (500 mg) by mouth 2 times daily. 180 tablet 3    vitamin C (ASCORBIC ACID) 1000 MG TABS Take 1 tablet (1,000 mg) by mouth daily      zoledronic Acid (RECLAST) 5 MG/100ML SOLN infusion Inject 5 mg into the vein once Every year (next dose 2023)       No current facility-administered medications for this visit.     Social History   See HPI    Family History     Family History   Problem Relation Age of Onset    Arthritis Mother     Alzheimer Disease Mother     Hyperlipidemia Mother     Osteoporosis Mother     Heart Disease Father         MI ( from this)    Alcohol/Drug Father     Arthritis Sister     Hypertension Sister     Other Cancer Sister         Luekemia    Neurologic Disorder Sister         Schizophrenic    Hypertension Sister             Hyperlipidemia Sister     Mental Illness Sister         Bipolar    Glaucoma Daughter     Diabetes Son         type 1,     Diabetes Son         ,  cancer    Esophageal Cancer Son         Drank and smoked    Substance Abuse Son     Other Cancer Son         Esophageal    Diabetes Other         type 2    Hyperlipidemia Other      No change in family history since the previous clinic visit.    Physical Exam     Temp Readings from Last 3 Encounters:   25 97.7  F (36.5  C) (Oral)   24 97  F (36.1  C) (Temporal)   24 97.9  F (36.6  C) (Oral)     BP  "Readings from Last 5 Encounters:   03/10/25 (!) 167/72   03/07/25 127/79   12/30/24 132/70   12/14/24 114/64   11/20/24 104/65     Pulse Readings from Last 1 Encounters:   03/10/25 79     Resp Readings from Last 1 Encounters:   03/10/25 16     Estimated body mass index is 33.59 kg/m  as calculated from the following:    Height as of this encounter: 1.664 m (5' 5.5\").    Weight as of this encounter: 93 kg (205 lb).      GEN: NAD.   HEENT:  Anicteric, noninjected sclera. No obvious external lesions of the ear and nose. Hearing intact.  CV: S1, S2. RRR. No m/r/g  PULM: No increased work of breathing. CTA bilaterally   MSK: MCPs, PIPs, DIPs without swelling or tenderness to palpation.  Mild ulnar deviation at the MCPs bilaterally.  Wrists without swelling or tenderness to palpation.  Elbows and shoulders without swelling or tenderness to palpation.  Knees, ankles, and MTPs without swelling or tenderness to palpation; with the exception of mild tenderness to palpation on the plantar aspect of the right first MTP where there is also bony hypertrophy.  SKIN: No rash or jaundice seen  PSYCH: Alert. Appropriate.      Labs       CBC  Recent Labs   Lab Test 03/03/25  0948 12/14/24  0622 11/20/24  1122 09/27/24  1006 09/04/24  1011 08/18/21  1054 05/10/21  1023 02/16/21  1019 11/16/20  1028   WBC 5.4  --  5.3  --  4.6   < > 5.0 5.7 5.7   RBC 3.58*  --  3.62*  --  3.34*   < > 3.39* 3.81 3.91   HGB 11.7 10.3* 12.5   < > 11.6*   < > 11.3* 12.5 12.0   HCT 36.0  --  37.5  --  35.5   < > 35.6 38.9 37.8   *  --  104*  --  106*   < > 105* 102* 97   RDW 14.5  --  14.1  --  14.6   < > 14.3 15.2* 16.1*     --  350  --  280   < > 333 335 361   MCH 32.7  --  34.5*  --  34.7*   < > 33.3* 32.8 30.7   MCHC 32.5  --  33.3  --  32.7   < > 31.7 32.1 31.7   NEUTROPHIL 63  --  58  --  58   < > 57.7 65.8 60.5   LYMPH 21  --  22  --  18   < > 18.5 17.0 20.4   MONOCYTE 12  --  16  --  18   < > 17.3 12.1 12.8   EOSINOPHIL 3  --  3  --  5  "  < > 6.3 4.9 6.1   BASOPHIL 0  --  0  --  0   < > 0.2 0.2 0.2   ANEU  --   --   --   --   --   --  2.9 3.8 3.5   ALYM  --   --   --   --   --   --  0.9 1.0 1.2   MARYURI  --   --   --   --   --   --  0.9 0.7 0.7   AEOS  --   --   --   --   --   --  0.3 0.3 0.4   ABAS  --   --   --   --   --   --  0.0 0.0 0.0   ANEUTAUTO 3.4  --  3.1  --  2.7   < >  --   --   --    ALYMPAUTO 1.2  --  1.2  --  0.9   < >  --   --   --    AMONOAUTO 0.6  --  0.8  --  0.8   < >  --   --   --    AEOSAUTO 0.2  --  0.2  --  0.2   < >  --   --   --    ABSBASO 0.0  --  0.0  --  0.0   < >  --   --   --     < > = values in this interval not displayed.     CMP  Recent Labs   Lab Test 03/03/25  0948 12/14/24  0623 11/20/24  1122 09/04/24  1011 05/14/24  1001 03/25/24  1018 08/22/23  1005 06/20/23  1329 04/25/23  1008 02/22/23  1018 08/18/21  1054 05/10/21  1023 03/18/21  1350 02/16/21  1019 12/18/20  0923 11/16/20  1028   NA  --  136  --   --   --   --   --  142  --  142   < >  --   --   --   --   --    POTASSIUM  --  3.7  --   --   --   --   --  3.5  --  4.0   < >  --   --   --   --   --    CHLORIDE  --  101  --   --   --   --   --  105  --  103   < >  --   --   --   --   --    CO2  --  22  --   --   --   --   --  24  --  25   < >  --   --   --   --   --    ANIONGAP  --  13  --   --   --   --   --  13  --  14   < >  --   --   --   --   --    GLC  --  116*  --   --   --   --   --  123*  --  104*   < >  --   --   --   --   --    BUN  --  10.0  --   --   --   --   --  9.6  --  11.0   < >  --   --   --   --   --    CR 0.68 0.58 0.66 0.67   < > 0.61   < > 0.60   < > 0.52   < > 0.59  --  0.62  --  0.60   GFRESTIMATED 86 90 87 87   < > 89   < > 90   < > >90   < > 87  --  86  --  87   GFRESTBLACK  --   --   --   --   --   --   --   --   --   --   --  >90  --  >90  --  >90   BRANDEE 9.8 8.5*  --   --   --  9.2  --  9.8   < > 9.4   < >  --    < >  --    < >  --    BILITOTAL 0.4  --  0.4 0.4   < >  --    < > 0.3   < >  --    < > 0.3  --  0.4  --  0.4   ALBUMIN 4.3   --  4.3 4.4   < >  --    < > 4.6   < >  --    < > 3.6  --  4.2  --  4.1   PROTTOTAL 7.5  --  7.5 7.5   < >  --    < > 8.1   < >  --    < > 7.0  --  8.0  --  7.6   ALKPHOS 42  --  39* 47   < >  --    < > 50   < >  --    < > 45  --  44  --  47   AST 26  --  30 31   < >  --    < > 29   < >  --    < > 22  --  21  --  25   ALT 16  --  23 27   < >  --    < > 21   < >  --    < > 36  --  34  --  36    < > = values in this interval not displayed.     Calcium/VitaminD  Recent Labs   Lab Test 03/03/25  0948 12/14/24  0623 03/25/24  1018 06/20/23  1329 06/05/23  0941 04/25/23  1008   BRANDEE 9.8 8.5* 9.2   < >  --  9.8   VITDT 42  --   --   --  60 66    < > = values in this interval not displayed.     ESR/CRP  Recent Labs   Lab Test 03/03/25  0948 11/20/24  1122 09/04/24  1011 07/12/22  1112 04/29/22  1000 02/04/22  0832 12/08/21  1053   SED 28 29 20   < > 15 14 16   CRP  --   --   --   --  <2.9 <2.9 <2.9   CRPI <3.00 <3.00 <3.00   < >  --   --   --     < > = values in this interval not displayed.     Lipid Panel  Recent Labs   Lab Test 03/03/25  0948 01/29/24  1031 01/16/23  1122   CHOL 175 210* 185   TRIG 149 133 124   HDL 82 78 74   LDL 63 105* 86   NHDL 93 132* 111     Hepatitis B  Recent Labs   Lab Test 10/20/22  1256   HBCAB Nonreactive   HEPBANG Nonreactive     Hepatitis C  Recent Labs   Lab Test 10/20/22  1256   HCVAB Nonreactive     Tuberculosis Screening  Recent Labs   Lab Test 02/06/24  1036 02/16/23  0000 10/20/22  1256 08/18/21  1054 05/07/18  1033   TBRES Negative Indeterminate* Negative   < >  --    TBRSLT  --   --   --   --  Negative   TBAGN  --   --   --   --  0.00    < > = values in this interval not displayed.       Immunization History     Immunization History   Administered Date(s) Administered    COVID-19 MONOVALENT 12+ (Pfizer) 02/09/2021, 03/03/2021    Flu, Unspecified 10/20/2013    Influenza (High Dose) Trivalent,PF (Fluzone) 10/28/2013, 09/23/2014, 11/11/2015, 09/23/2016, 09/24/2019, 09/26/2024     Influenza (IIV3) PF 10/12/1999, 10/26/2001, 10/19/2002, 10/30/2003, 10/28/2004, 10/21/2005, 10/26/2007, 10/21/2009, 10/05/2011, 10/13/2012    Influenza (prior to 2024) 10/24/2006, 11/14/2007, 10/22/2008, 10/21/2009    Influenza Vaccine (Flucelvax Quadrivalent) 09/28/2017, 09/27/2018    Influenza Vaccine 65+ (Fluzone HD) 10/07/2021, 11/16/2022, 12/11/2023    Influenza Vaccine >6 months,quad, PF 09/24/2020    Influenza Vaccine, 6+MO IM (QUADRIVALENT W/PRESERVATIVES) 09/24/2019, 11/16/2022    Influenza, Split Virus, Trivalent, Pf (Fluzone\Fluarix) 10/24/2006, 11/14/2007, 10/22/2008    Mantoux Tuberculin Skin Test 11/03/2006    Pneumo Conj 13-V (2010&after) 07/29/2015    Pneumococcal 23 valent 06/26/2000, 10/26/2001, 06/01/2007, 06/02/2010    TD,PF 7+ (Tenivac) 12/10/2008, 07/20/2009    Tdap (Adult) Unspecified Formulation 12/12/2018    Zoster recombinant adjuvanted (SHINGRIX) 12/12/2018, 02/14/2019          Chart documentation done in part with Dragon Voice recognition Software. Although reviewed after completion, some word and grammatical error may remain.    Nico Byers MD

## 2025-03-20 ENCOUNTER — PRE VISIT (OUTPATIENT)
Dept: OTOLARYNGOLOGY | Facility: CLINIC | Age: 85
End: 2025-03-20

## 2025-03-26 ENCOUNTER — INFUSION THERAPY VISIT (OUTPATIENT)
Dept: INFUSION THERAPY | Facility: CLINIC | Age: 85
End: 2025-03-26
Attending: INTERNAL MEDICINE
Payer: COMMERCIAL

## 2025-03-26 VITALS
DIASTOLIC BLOOD PRESSURE: 79 MMHG | WEIGHT: 205 LBS | RESPIRATION RATE: 18 BRPM | BODY MASS INDEX: 33.59 KG/M2 | SYSTOLIC BLOOD PRESSURE: 135 MMHG | TEMPERATURE: 98.1 F | OXYGEN SATURATION: 93 %

## 2025-03-26 DIAGNOSIS — Z92.29 HISTORY OF BISPHOSPHONATE THERAPY: ICD-10-CM

## 2025-03-26 DIAGNOSIS — M81.0 OSTEOPOROSIS, UNSPECIFIED OSTEOPOROSIS TYPE, UNSPECIFIED PATHOLOGICAL FRACTURE PRESENCE: Primary | ICD-10-CM

## 2025-03-26 PROCEDURE — 250N000011 HC RX IP 250 OP 636: Performed by: INTERNAL MEDICINE

## 2025-03-26 PROCEDURE — 96365 THER/PROPH/DIAG IV INF INIT: CPT

## 2025-03-26 RX ORDER — ZOLEDRONIC ACID 0.05 MG/ML
5 INJECTION, SOLUTION INTRAVENOUS ONCE
Status: COMPLETED | OUTPATIENT
Start: 2025-03-26 | End: 2025-03-26

## 2025-03-26 RX ORDER — EPINEPHRINE 1 MG/ML
0.3 INJECTION, SOLUTION INTRAMUSCULAR; SUBCUTANEOUS EVERY 5 MIN PRN
OUTPATIENT
Start: 2026-03-26

## 2025-03-26 RX ORDER — DIPHENHYDRAMINE HYDROCHLORIDE 50 MG/ML
50 INJECTION, SOLUTION INTRAMUSCULAR; INTRAVENOUS
Start: 2026-03-26

## 2025-03-26 RX ORDER — HEPARIN SODIUM (PORCINE) LOCK FLUSH IV SOLN 100 UNIT/ML 100 UNIT/ML
5 SOLUTION INTRAVENOUS
OUTPATIENT
Start: 2026-03-26

## 2025-03-26 RX ORDER — HEPARIN SODIUM,PORCINE 10 UNIT/ML
5-20 VIAL (ML) INTRAVENOUS DAILY PRN
OUTPATIENT
Start: 2026-03-26

## 2025-03-26 RX ORDER — ALBUTEROL SULFATE 90 UG/1
1-2 INHALANT RESPIRATORY (INHALATION)
Start: 2026-03-26

## 2025-03-26 RX ORDER — METHYLPREDNISOLONE SODIUM SUCCINATE 40 MG/ML
40 INJECTION INTRAMUSCULAR; INTRAVENOUS
Start: 2026-03-26

## 2025-03-26 RX ORDER — ZOLEDRONIC ACID 0.05 MG/ML
5 INJECTION, SOLUTION INTRAVENOUS ONCE
Start: 2026-03-26

## 2025-03-26 RX ORDER — ALBUTEROL SULFATE 0.83 MG/ML
2.5 SOLUTION RESPIRATORY (INHALATION)
OUTPATIENT
Start: 2026-03-26

## 2025-03-26 RX ORDER — MEPERIDINE HYDROCHLORIDE 25 MG/ML
25 INJECTION INTRAMUSCULAR; INTRAVENOUS; SUBCUTANEOUS
OUTPATIENT
Start: 2026-03-26

## 2025-03-26 RX ORDER — DIPHENHYDRAMINE HYDROCHLORIDE 50 MG/ML
25 INJECTION, SOLUTION INTRAMUSCULAR; INTRAVENOUS
Start: 2026-03-26

## 2025-03-26 RX ADMIN — ZOLEDRONIC ACID 5 MG: 0.05 INJECTION, SOLUTION INTRAVENOUS at 11:22

## 2025-03-26 NOTE — CONFIDENTIAL NOTE
Infusion Nursing Note:  Ginger Marshall presents today for reclast.    Patient seen by provider today: No   present during visit today: Not Applicable.    Note: Patient reports that she is taking Calcium and Vit D.      Intravenous Access:  Peripheral IV placed.    Treatment Conditions:   Latest Reference Range & Units 03/03/25 09:48   Creatinine 0.51 - 0.95 mg/dL 0.68   GFR Estimate >60 mL/min/1.73m2 86   Calcium 8.8 - 10.4 mg/dL 9.8   Albumin 3.5 - 5.2 g/dL 4.3         Post Infusion Assessment:  Patient tolerated infusion without incident.  Blood return noted pre and post infusion.  Site patent and intact, free from redness, edema or discomfort.  No evidence of extravasations.  Access discontinued per protocol.       Discharge Plan:   AVS to patient via MYCHART.  Patient will return no further appts scheduled at this time.   Patient discharged in stable condition accompanied by: self.  Departure Mode: Ambulatory.      Farooq Urban RN

## 2025-04-08 ENCOUNTER — DOCUMENTATION ONLY (OUTPATIENT)
Dept: SLEEP MEDICINE | Facility: CLINIC | Age: 85
End: 2025-04-08
Payer: COMMERCIAL

## 2025-04-08 DIAGNOSIS — G47.33 OBSTRUCTIVE SLEEP APNEA SYNDROME: Primary | ICD-10-CM

## 2025-04-09 ENCOUNTER — NURSE TRIAGE (OUTPATIENT)
Dept: FAMILY MEDICINE | Facility: CLINIC | Age: 85
End: 2025-04-09

## 2025-04-09 ENCOUNTER — OFFICE VISIT (OUTPATIENT)
Dept: FAMILY MEDICINE | Facility: CLINIC | Age: 85
End: 2025-04-09
Payer: COMMERCIAL

## 2025-04-09 VITALS
DIASTOLIC BLOOD PRESSURE: 80 MMHG | WEIGHT: 199.9 LBS | SYSTOLIC BLOOD PRESSURE: 147 MMHG | HEIGHT: 66 IN | RESPIRATION RATE: 20 BRPM | BODY MASS INDEX: 32.13 KG/M2 | OXYGEN SATURATION: 94 % | TEMPERATURE: 98.2 F | HEART RATE: 78 BPM

## 2025-04-09 DIAGNOSIS — H10.11 ALLERGIC CONJUNCTIVITIS, RIGHT: Primary | ICD-10-CM

## 2025-04-09 PROCEDURE — 99214 OFFICE O/P EST MOD 30 MIN: CPT | Performed by: PHYSICIAN ASSISTANT

## 2025-04-09 PROCEDURE — 3077F SYST BP >= 140 MM HG: CPT | Performed by: PHYSICIAN ASSISTANT

## 2025-04-09 PROCEDURE — G2211 COMPLEX E/M VISIT ADD ON: HCPCS | Performed by: PHYSICIAN ASSISTANT

## 2025-04-09 PROCEDURE — 3079F DIAST BP 80-89 MM HG: CPT | Performed by: PHYSICIAN ASSISTANT

## 2025-04-09 RX ORDER — OLOPATADINE HYDROCHLORIDE 2 MG/ML
1 SOLUTION/ DROPS OPHTHALMIC DAILY
Qty: 2.5 ML | Refills: 1 | Status: SHIPPED | OUTPATIENT
Start: 2025-04-09

## 2025-04-09 RX ORDER — TOBRAMYCIN AND DEXAMETHASONE 3; 1 MG/ML; MG/ML
1 SUSPENSION/ DROPS OPHTHALMIC 2 TIMES DAILY
Qty: 2.5 ML | Refills: 0 | Status: SHIPPED | OUTPATIENT
Start: 2025-04-09 | End: 2025-04-12

## 2025-04-09 ASSESSMENT — ENCOUNTER SYMPTOMS: EYE PAIN: 1

## 2025-04-09 NOTE — PROGRESS NOTES
Assessment & Plan     Allergic conjunctivitis, right    She does have chronic allergies and allergic conjunctivitis but current flare. Will do short course of steroid/antibiotic drops to help with inflammation while giving the antihistamine drops a chance to work. Recommended increasing Claritin to twice a day for the next week and then go back to once a day. Do not see any signs of infection at this time.    - tobramycin-dexAMETHasone (TOBRADEX) 0.3-0.1 % ophthalmic suspension; Place 1 drop into the right eye 2 times daily for 3 days.  - olopatadine (PATADAY) 0.2 % ophthalmic solution; Place 0.05 mLs (1 drop) into both eyes daily. Uses mostly in Spring      The longitudinal plan of care for the diagnosis(es)/condition(s) as documented were addressed during this visit. Due to the added complexity in care, I will continue to support Ginger in the subsequent management and with ongoing continuity of care.          Subjective   Ginger is a 84 year old, presenting for the following health issues:  Eye Problem        4/9/2025     2:49 PM   Additional Questions   Roomed by Leeanna Saldaña     Eye Problem     History of Present Illness       Reason for visit:  Eye problem  Symptom onset:  1-3 days ago  Symptoms include:  Redness & itch  Symptom intensity:  Moderate  Symptom progression:  Worsening  Had these symptoms before:  Yes  Has tried/received treatment for these symptoms:  No  What makes it worse:  No  What makes it better:  No   She is taking medications regularly.          Eye(s) Problem  Onset/Duration: 1-3 days ago  Description:   Location: Right   Pain: No  Redness: YES- itchy  Accompanying Signs & Symptoms:  Discharge/mattering: No  Swelling: YES  Visual changes: No  Fever: No  Nasal Congestion: No  Bothered by bright lights: No  History:  Trauma: No  Foreign body exposure: No  Wearing contacts: No  Precipitating or alleviating factors: None  Therapies tried and outcome: OTC drops for dry eye- no help, pataday  "drops- no help      Review of Systems  Constitutional, HEENT, cardiovascular, pulmonary, gi and gu systems are negative, except as otherwise noted.        Objective    BP (!) 147/80 (BP Location: Right arm, Patient Position: Sitting, Cuff Size: Adult Regular)   Pulse 78   Temp 98.2  F (36.8  C) (Oral)   Resp 20   Ht 1.664 m (5' 5.5\")   Wt 90.7 kg (199 lb 14.4 oz)   SpO2 94%   BMI 32.76 kg/m    Body mass index is 32.76 kg/m .      Physical Exam   GENERAL: alert and no distress  EYES: Eyes grossly normal to inspection, PERRL, EOMI, conjunctivae and sclerae normal, and eyelids- Slight redness and swelling of medial parts of both upper and lower eyelids of right eye. Non-tender but itchy.  MS: no gross musculoskeletal defects noted, no edema  SKIN: no suspicious lesions or rashes  NEURO: Normal strength and tone, mentation intact and speech normal  PSYCH: mentation appears normal, affect normal/bright            Signed Electronically by: Ministerio Sen PA-C    "

## 2025-04-09 NOTE — TELEPHONE ENCOUNTER
Nurse Triage SBAR    Is this a 2nd Level Triage? YES    Situation: Eyelid redness and itchy-Right    Background: Started about a week ago for the itchiness and then the redness came yesterday, and now the upper and lower lid are red today.    Assessment:   Right eye, pt complains of itching and redness in the corner of the eye on the eyelids.    Bottom lid looks a little swollen.  She does not wear contacts, denies pain.  No foreign body in the eye or chemical.  No drainage noted.      Protocol Recommended Disposition:   Go To Office Now    Recommendation: Recommended to go to office now.  Appointment made for today at 3:00pm at [facility] with Ministerio Sen PA-C. Instructed to call back if worsens.      Does the patient meet one of the following criteria for ADS visit consideration? 16+ years old, with an [facility] PCP     TIP  Providers, please consider if this condition is appropriate for management at one of our Acute and Diagnostic Services sites.     If patient is a good candidate, please use dotphrase <dot>triageresponse and select Refer to ADS to document.    Reason for Disposition   Eyelid (outer) is very red    Additional Information   Negative: Chemical got in the eye   Negative: Piece of something got in the eye   Negative: Followed an eye injury   Negative: White, yellow, or green pus in the eyes   Negative: Eyelashes stuck together (matting) from pus   Negative: Eyelid swelling and no redness of white of eye (sclera)   Negative: Caused by pollen or other allergy OR main symptom is itchy eyes   Negative: Suspected reaction to antibiotic eye drops   Negative: Red, widespread rash also present   Negative: SEVERE eye pain (e.g., excruciating pain and patient unable to do normal activities)   Negative: Eyelid is very swollen (shut or almost) and fever   Negative: Recent eye surgery and has increasing eye pain   Negative: Patient sounds very sick or weak to the triager   Negative: Cloudy spot or sore seen on  the cornea (clear part of the eye)   Negative: Looking at light causes MODERATE to SEVERE eye pain (i.e., photophobia)   Negative: Blurred vision AND new-onset or getting worse   Negative: MODERATE eye pain or discomfort (e.g., interferes with normal activities or awakens from sleep; more than mild)   Negative: Eyelid is very swollen (shut or almost)    Protocols used: Eye - Redness-A-OH    Anita Anderson RN BSN  Clinic Nurse  [facility]

## 2025-04-15 ENCOUNTER — VIRTUAL VISIT (OUTPATIENT)
Facility: CLINIC | Age: 85
End: 2025-04-15
Payer: COMMERCIAL

## 2025-04-15 DIAGNOSIS — F33.1 MODERATE EPISODE OF RECURRENT MAJOR DEPRESSIVE DISORDER (H): Primary | ICD-10-CM

## 2025-04-15 PROCEDURE — 90837 PSYTX W PT 60 MINUTES: CPT | Mod: 95

## 2025-04-15 NOTE — PROGRESS NOTES
[facility]                                     Progress Note    Patient Name: Ginger Marshall  Date: 2/13/25         Service Type: Individual      Session Start Time: 9:00 am  Session End Time: 9:40 am     Session Length: 40 min    Session #: 23    Answers submitted by the patient for this visit:    Attendees: Client attended alone    Service Modality:  Video Visit:      Provider verified identity through the following two step process.  Patient provided:  Patient is known previously to provider    Telemedicine Visit: The patient's condition can be safely assessed and treated via synchronous audio and visual telemedicine encounter.      Reason for Telemedicine Visit: Patient convenience (e.g. access to timely appointments / distance to available provider)    Originating Site (Patient Location): Patient's home    Distant Site (Provider Location): Provider Remote Setting- Home Office    Consent:  The patient/guardian has verbally consented to: the potential risks and benefits of telemedicine (video visit) versus in person care; bill my insurance or make self-payment for services provided; and responsibility for payment of non-covered services.     Patient would like the video invitation sent by:  My Chart    Mode of Communication:  [facility]    Distant Location (Provider):  Off-site    As the provider I attest to compliance with applicable laws and regulations related to telemedicine.    DATA  Interactive Complexity: No     Crisis: No   Progress Since Last Session (Related to Symptoms / Goals / Homework):   Symptoms: Improving mood , low energy    Homework: Partially completed      Episode of Care Goals: Minimal progress - ACTION (Actively working towards change); Intervened by reinforcing change plan / affirming steps taken     Current / Ongoing Stressors and Concerns:  Resolved pain with the exception of calves and feet at night. Daughter providing in home care. Receiving  [facility] resources for support, impatience with responses. Low quality sleep, even w cpap-eligible for a new one in December, hoping for a different model..   Ongoing: Grieving loss of adult son who was blind and lived alone, fell and passed away, second loss of an adult/child. Regrets about parenting.      Treatment Objective(s) Addressed in This Session:    Practice boundaries and radical acceptance of reality regarding sister, sons, reality of accident  Patient will Increase interest, engagement, and pleasure in doing things  Decrease frequency and intensity of feeling down, depressed, hopeless  Improve quantity and quality of night time sleep / decrease daytime naps  Feel less tired and more energy during the day   Improve diet, appetite, mindful eating, and / or meal planning  Identify negative self-talk and behaviors: challenge core beliefs, myths, and actions  Improve concentration, focus, and mindfulness in daily activities   Feel less fidgety, restless or slow in daily activities / interpersonal interactions.     Intervention:  Supportive therapy: Provided active listening and validation. Reviewed grounding/mindfulness skills, coping skills, support seeking actions, acceptance of reality, gratitude practice    Motivational Interviewing-MAINTAIN  Target Behavior:  radical acceptance of reality, gratitude , jena practices  Proactive communication with providers, family, attend appointments, pursue enjoyable activities    MI Intervention: Expressed Empathy/Understanding, Supported Autonomy, Collaboration, Evocation and Open-ended questions     Change Talk Expressed by the Patient: Desire to change Ability to change Reasons to change Activation Taking steps    Provider Response to Change Talk: E - Evoked more info from patient about behavior change, A - Affirmed patient's thoughts, decisions, or attempts at behavior change, and R - Reflected patient's change talk    Assessments completed prior to visit:  LAURA   12/5/22  The following assessments were completed by patient for this visit:  PHQ2:       2/27/2023     8:50 AM 2/24/2023     6:48 AM 2/20/2023     5:39 PM 1/15/2023    11:54 AM 11/16/2022     1:33 PM 8/5/2022     8:11 AM 7/6/2022     4:18 PM   PHQ-2 ( 1999 Pfizer)   Q1: Little interest or pleasure in doing things 0 1 1 1 1  3    Q2: Feeling down, depressed or hopeless 0 1 1 1 1  0    PHQ-2 Score 0 2 2 2 2 3    Q1: Little interest or pleasure in doing things  Several days Several days Several days Several days Nearly every day    Q2: Feeling down, depressed or hopeless  Several days Several days Several days Several days Not at all    PHQ-2 Score  2 2 2 2 3 Incomplete       Proxy-reported     PHQ9:       8/19/2024     9:41 AM 9/27/2024     8:38 AM 10/7/2024     7:09 PM 11/18/2024     9:10 PM 1/15/2025    12:07 PM 3/7/2025     6:32 AM 4/9/2025     2:40 PM   PHQ-9 SCORE   PHQ-9 Total Score MyChart 11 (Moderate depression)  3 (Minimal depression) 5 (Mild depression) 6 (Mild depression) 9 (Mild depression) 8 (Mild depression)   PHQ-9 Total Score 11 8 3 5  6  9  8        Patient-reported     Patient Health Questionnaire (Submitted on 9/26/2023)  If you checked off any problems, how difficult have these problems made it for you to do your work, take care of things at home, or get along with other people?: Somewhat difficult  PHQ9 TOTAL SCORE: 4  GAD2:       4/21/2024     1:25 PM 7/7/2024     7:31 AM 8/19/2024     9:42 AM 10/4/2024     6:36 AM 11/18/2024     9:10 PM 1/15/2025    12:08 PM 4/15/2025     9:39 AM   SPRING-2   Feeling nervous, anxious, or on edge 0 0 0 0 0 0 1   Not being able to stop or control worrying 0 0 0 0 0 0 0   SPRING-2 Total Score 0 0 0 0 0  0  1        Patient-reported     GAD7:       7/6/2022     4:18 PM 8/5/2022     8:11 AM 11/16/2022     1:33 PM 6/5/2023     2:31 PM 2/28/2024    10:14 AM 4/2/2024     9:05 PM 5/31/2024     9:14 AM   SPRING-7 SCORE   Total Score 2 (minimal anxiety) 5 (mild anxiety) 2  (minimal anxiety)  5 (mild anxiety) 1 (minimal anxiety) 1 (minimal anxiety)   Total Score 2 5 2 3 5 1 1     CAGE-AID:       12/5/2022     9:28 AM   CAGE-AID Total Score   Total Score 0   Total Score MyChart 0 (A total score of 2 or greater is considered clinically significant)     PROMIS 10-Global Health (only subscores and total score):       8/23/2023     8:34 AM 12/14/2023     6:28 AM 3/13/2024     6:31 PM 7/7/2024     7:34 AM 9/1/2024    11:20 AM 1/11/2025    10:48 AM 1/24/2025     8:25 AM   PROMIS-10 Scores Only   Global Mental Health Score 12 13 11 13 10 9  14    Global Physical Health Score 12 12 11 12 9 11  12    PROMIS TOTAL - SUBSCORES 24 25 22 25 19 20  26        Patient-reported     [place] Suicide Severity Rating Scale (Lifetime/Recent)      12/5/2022    11:00 AM 11/15/2024    11:11 AM 12/13/2024    10:53 AM   [place] Suicide Severity Rating (Lifetime/Recent)   Q1 Wished to be Dead (Past Month) no 0-->no 0-->no   Q2 Suicidal Thoughts (Past Month) no 0-->no 0-->no   Q3 Suicidal Thought Method no     Q4 Suicidal Intent without Specific Plan no     Q5 Suicide Intent with Specific Plan no     Q6 Suicide Behavior (Lifetime) no 0-->no 0-->no   Level of Risk per Screen low risk no risks indicated no risks indicated         ASSESSMENT: Current Emotional / Mental Status (status of significant symptoms):   Risk status (Self / Other harm or suicidal ideation)   Patient denies current fears or concerns for personal safety.   Patient denies current or recent suicidal ideation or behaviors.   Patient denies current or recent homicidal ideation or behaviors.   Patient denies current or recent self injurious behavior or ideation.   Patient denies other safety concerns.   Patient reports there has been no change in risk factors since their last session.     Patient reports there has been no change in protective factors since their last session.     Recommended that patient call 911 or go to the local ED should there  be a change in any of these risk factors     Appearance:   Appropriate    Eye Contact:   Good    Psychomotor Behavior: Normal    Attitude:   Pleasant Attentive   Orientation:   All   Speech    Rate / Production: Normal/ Responsive Normal     Volume:  Normal    Mood:    Anxious     Affect:    Appropriate    Thought Content:  Clear    Thought Form:  Coherent  Logical    Insight:    Fair      Medication Review:   No changes to current psychiatric medication(s)     Medication Compliance:   Yes     Changes in Health Issues:   None reported     Chemical Use Review:   Substance Use: Chemical use reviewed, no active concerns identified      Tobacco Use: No current tobacco use.      Diagnosis:  1. Moderate episode of recurrent major depressive disorder (H)      Collateral Reports Completed:   Not Applicable    PLAN: (Patient Tasks / Therapist Tasks / Other)   Embrace radical acceptance and boundaries. Use proactive communication with providers, family, sleep hygiene skills, grounding /mindfulness, loving kindness toward self. Engage with groups as possible.    Rocío Arenas, [facility] 4/15/2025                                                                                               Individual Treatment Plan    Patient's Name: Ginger Marshall  YOB: 1940    Date of Creation: 2/8/23  Date Treatment Plan Last Reviewed/Revised:   4/15/2025    DSM5 Diagnoses: 296.31 (F33.0) Major Depressive Disorder, Recurrent Episode, Mild With anxious distress  Psychosocial / Contextual Factors: aging, losses, generational trauma  PROMIS (reviewed every 90 days):     26 1/24/25    Referral / Collaboration:  Referral to another professional/service is not indicated at this time..    Anticipated number of session for this episode of care:  30+  Anticipation frequency of session:  Reduced to once every month or 2 or 3.  Anticipated Duration of each session: 53 or more minutes  Treatment plan will be reviewed in 90 days or when goals  have been changed.     MeasurableTreatment Goal(s) related to diagnosis / functional impairment(s)  Goal 1: Patient will experience an improvement in mood and functioning as evidenced by assessment scores, self report and clinician observation    I will know I've met my goal when things feel better (paraphrase).      Objective #A (Patient Action)    Patient will increase understanding of steps in the grief process   Talk to others about losses  Use prayer practices and jena community to support well being.   Practice boundaries and radical acceptance of reality regarding sister sons, reality of accident   Engage in social activities at [facility]  Status: MAINTAIN    Intervention(s)  Therapist will teach CBT, DBT  and support grief processing skills, prayer practices .    Objective #B  Patient will Increase interest, engagement, and pleasure in doing things  Decrease frequency and intensity of feeling down, depressed, hopeless  Improve quantity and quality of night time sleep / decrease daytime naps  Feel less tired and more energy during the day   Improve diet, appetite, mindful eating, and / or meal planning  Identify negative self-talk and behaviors: challenge core beliefs, myths, and actions  Improve concentration, focus, and mindfulness in daily activities   Feel less fidgety, restless or slow in daily activities / interpersonal interactions.    Status: 4/15/2025 CONTINUE      Intervention(s)  Therapist will  teach cbt, dbt,MI, mindfulness and behavioral activation and assign homework .      Patient has reviewed and agreed to the above plan.      Rocío Arenas, [facility]  4/15/2025

## 2025-04-16 PROBLEM — Z98.890 S/P LEFT ROTATOR CUFF REPAIR: Status: RESOLVED | Noted: 2024-10-31 | Resolved: 2025-04-16

## 2025-04-16 PROBLEM — M19.012 OSTEOARTHRITIS OF LEFT GLENOHUMERAL JOINT: Status: RESOLVED | Noted: 2024-10-31 | Resolved: 2025-04-16

## 2025-04-16 PROBLEM — M75.82 TENDINITIS OF LEFT ROTATOR CUFF: Status: RESOLVED | Noted: 2024-10-31 | Resolved: 2025-04-16

## 2025-04-21 ENCOUNTER — VIRTUAL VISIT (OUTPATIENT)
Dept: PHARMACY | Facility: CLINIC | Age: 85
End: 2025-04-21
Payer: COMMERCIAL

## 2025-04-21 DIAGNOSIS — M05.741 RHEUMATOID ARTHRITIS INVOLVING RIGHT HAND WITH POSITIVE RHEUMATOID FACTOR (H): ICD-10-CM

## 2025-04-21 DIAGNOSIS — E78.5 HYPERLIPIDEMIA LDL GOAL <100: ICD-10-CM

## 2025-04-21 DIAGNOSIS — J30.9 ALLERGIC RHINITIS: ICD-10-CM

## 2025-04-21 DIAGNOSIS — K21.9 GASTROESOPHAGEAL REFLUX DISEASE, UNSPECIFIED WHETHER ESOPHAGITIS PRESENT: Primary | ICD-10-CM

## 2025-04-21 DIAGNOSIS — Z78.9 TAKES DIETARY SUPPLEMENTS: ICD-10-CM

## 2025-04-21 DIAGNOSIS — M81.0 OSTEOPOROSIS, UNSPECIFIED OSTEOPOROSIS TYPE, UNSPECIFIED PATHOLOGICAL FRACTURE PRESENCE: ICD-10-CM

## 2025-04-21 DIAGNOSIS — F33.1 MODERATE EPISODE OF RECURRENT MAJOR DEPRESSIVE DISORDER (H): ICD-10-CM

## 2025-04-21 DIAGNOSIS — G47.00 INSOMNIA: ICD-10-CM

## 2025-04-21 DIAGNOSIS — G62.9 NEUROPATHY: ICD-10-CM

## 2025-04-21 DIAGNOSIS — F41.9 ANXIETY: ICD-10-CM

## 2025-04-21 PROCEDURE — 99207 PR NO CHARGE LOS: CPT | Mod: 95 | Performed by: PHARMACIST

## 2025-04-21 RX ORDER — MULTIVIT-MIN/IRON/FOLIC ACID/K 18-600-40
1 CAPSULE ORAL DAILY
COMMUNITY

## 2025-04-21 RX ORDER — LANOLIN ALCOHOL/MO/W.PET/CERES
1000 CREAM (GRAM) TOPICAL DAILY
COMMUNITY

## 2025-04-21 RX ORDER — LORATADINE 10 MG/1
10 TABLET ORAL DAILY
COMMUNITY

## 2025-04-21 NOTE — PATIENT INSTRUCTIONS
Recommendations from today's MTM visit:                                                    MTM (medication therapy management) is a service provided by a clinical pharmacist designed to help you get the most of out of your medicines.   Today we reviewed what your medicines are for, how to know if they are working, that your medicines are safe and how to make your medicine regimen as easy as possible.      Stop the diphenhydramine sleep pill.  Try magnesium glycinate 200 mg once daily in the evening to see if that helps with legs and sleep.  Increase calcium citrate to 600 mg twice daily. Its recommended to get 1200 mg of calcium daily divide.   You could try switching the sertraline to the evening to see if you fall asleep better and are less tired during the day.  Work on more weight bearing exercises. https://www.[facility].org/-/media/org/files/patient-education/exercise-weight-bearing.pdf   Sleep hygiene - https://www.[employer]/matter/sleep-hygiene-tips  Here are some other suggestions for better sleep     1. Establish a regular wake time. Use an alarm and do not vary by   more than 1 hour from day to day.   2. Go to bed around the same time each night. Select your bedtime   based on when you feel sleepy and how much sleep you need.   3. Use your bed only for sleep and sleep only in your bed.   4. If possible, do not nap during the day. Keep naps short if they are   needed or desired (less than 1 hour total).   5. If you struggle to fall asleep or get back to sleep at night, get out of   bed and return to bed when you are sleepy.   6. Make your sleep environment a comfortable place to be. Adjust   temperature, use darkening window shades, and minimize noise.   7. Don t watch the clock! Watching the clock makes it impossible   to sleep.   8. Establish regular meal times, especially dinner time.   9. Establish a regular exercise routine, but don t exercise within   2 hours of bedtime.   10. Put your day  "to rest by giving yourself some  wind down  time   before you go to bed each night. Do something relaxing like taking   a bath, meditating, or listening to music. Let go of the day s   anxieties before you get into bed!   11. Avoid caffeine in the afternoon and evening hours.   12. Limit alcohol intake. If it disrupts your sleep, it may be best not to   drink at all.       Follow-up: Return in 1 month (on 5/21/2025).    It was great speaking with you today.  I value your experience and would be very thankful for your time in providing feedback in our clinic survey. In the next few days, you may receive an email or text message from [email or web address] with a link to a survey related to your  clinical pharmacist.\"     To schedule another MTM appointment, please call the clinic directly or you may call the MTM scheduling line at [phone] or toll-free at [phone].     My Clinical Pharmacist's contact information:                                                      Please feel free to contact me with any questions or concerns you have.      Alise Harley, PharmD  Medication Therapy Management Pharmacist  [facility] - Monday and Wednesday 7:30 - 4:00      "

## 2025-04-21 NOTE — PROGRESS NOTES
Medication Therapy Management (MTM) Encounter    ASSESSMENT:                            Medication Adherence/Access: No issues identified.    Hyperlipidemia   Stable     GERD    Stable    Mental Health   Anxiety and Depression  Stable.  Encouraged her to continue to follow-up with her therapist regularly.  It is unsure if sertraline is contributing to daytime sleepiness however asked her to try switching to the evening if she continues to have sleepiness after stopping diphenhydramine.     Bone Health   Osteoporosis:   Recommended she increase her calcium intake so she is getting closer to 1200 mg of calcium per day.  Also suggested increasing her weightbearing exercises and provided her information in her after visit summary.     Supplements   Stable    Insomnia/neuropathy   Recommended she stop diphenhydramine due to anticholinergic side effects. Suggested she try taking magnesium glycinate for her leg cramps and may be able to help with sleep as well. I provided her with good sleep hygiene information and asked her to try to incorporate some of this to improve sleep.  Could consider alpha lipoic acid 600 mg daily for neuropathy at next visit.    Allergy   Stable and well managed.     Rheumatoid Arthritis:    Stable, managed and followed by rheumatology.    PLAN:                            Stop the diphenhydramine sleep pill.  Try magnesium glycinate 200 mg once daily in the evening to see if that helps with legs and sleep.  Increase calcium citrate to 600 mg twice daily. Its recommended to get 1200 mg of calcium daily divide.   You could try switching the sertraline to the evening to see if you fall asleep better and are less tired during the day.  Work on more weight bearing exercises. https://[email or web address].org/-/[email or web address]/files/patient-education/exercise-weight-bearing.pdf   Sleep hygiene resources provided in AVS.    Follow-up: Return in 1 month (on 5/21/2025).    SUBJECTIVE/OBJECTIVE:                           Ginger Marshall is a 84 year old female seen for a follow-up visit from 2/21/24.       Reason for visit: comprehensive medication review.    Allergies/ADRs: Reviewed in chart  Past Medical History: Reviewed in chart  Tobacco: She reports that she quit smoking about 26 years ago. Her smoking use included cigarettes. She started smoking about 67 years ago. She has a 41 pack-year smoking history. She has never used smokeless tobacco.  Alcohol: may have 1-2 glasses of wine every 2-3 months.    Medication Adherence/Access: no issues reported.    Hyperlipidemia   atorvastatin 40mg daily    Patient reports no significant myalgias or other side effects.     GERD    Pantoprazole 40 mg once daily     Patient reports no current symptoms.   Patient feels that current regimen is effective.  The patient does not have a history of GI bleed. She does have history of ulcer. She did have upper endoscopy in June 2009 with no findings. She doesn't notice that certain foods make her reflux worse.       Mental Health   Anxiety and Depression  Sertraline 100 mg once daily morning  Bupropion  mg once daily in the morning    Patient reports no current medication side effects. She feels this is effective.    Medication History: buspirone and Desvenlafaxine 100 mg once daily but switched to sertraline and bupropion.   Current symptoms include: none  Patient reports symptoms improved since being sertraline and bupropion  Patient reports the following stressors: none  Patient is seeing a therapist, Rocío Mendez every 3 months.   Patient is not seeing a psychiatrist.    Therapies tried and response: during chart review saw that patient has been on sertraline 25 mg daily in the past and it was stopped in 2018 by patient since well controlled. It does not appear she had side effects from sertraline. She has also tried duloxetine 60 mg twice daily for neuropathy.  It was stopped in 2017 but unsure why. Buspirone and Desvenlafaxine 100 tried  but switched to sertraline and bupropion.        Bone Health   Osteoporosis:   calcium 300 mg once daily  Vitamin D 1000 IU daily  Reclast infusion once yearly    Patient is not experiencing side effects. She is following with endocrinology  DEXA History: 2/6/24 - improved T scores from previous  Patient is getting 0-1 serving(s)/day of calcium in their diet.  Risk factors: post-menopausal and chronic PPI use  She does walk her dog about 6 times a day. No other activities noted.       Supplements   Vitamin C 500 mg daily  Ferrous gluconate 324 mg once daily  Vitamin B12 1000 mcg daily    No reported issues at this time.        Insomnia /neuropathy  diphenhydramine 50 mg at bedtime     She has tried melatonin in the past but can't recall the dose. It was a while ago since she has tried it.  Patient reports trouble staying asleep and trouble falling asleep.  It will take 30-40 minutes to fall asleep but then she wakes up several times a night but unsure why. During the day she feels tired all day and does take a nap in the afternoon.     She does use a CPAP every night.         Allergy   ophthalmic drops - olopatadine 0.2% once daily  Ipratropium 0.06% nasal spray as needed  Loratadine 10 mg once daily    Patient reports no current medication side effects.    Patient feels that current therapy is effective.        Rheumatoid Arthritis:    Rinvoq 15 mg once daily  Methotrexate 25 mg once weekly  Leucovorin 5 mg once weekly 24 hours after methotrexate  Naproxen 440 mg daily as needed - using a couple times a month      Patient reports no side effects. She is followed and managed by Rheumatologist Dr. Byers.     Today's Vitals: There were no vitals taken for this visit.  ----------------      I spent 33 minutes with this patient today. All changes were made via collaborative practice agreement with Duy Leyva MD.     A summary of these recommendations was mailed to the patient and was sent via [employer].    Alise  Triston Harley  Medication Therapy Management Pharmacist    Telemedicine Visit Details  The patient's medications can be safely assessed via a telemedicine encounter.  Type of service:  Telephone visit  Originating Location (pt. Location): Home    Distant Location (provider location):  On-site  Start Time: 1:00 PM  End Time: 1:33 PM     Medication Therapy Recommendations  Insomnia   1 Rationale: Unsafe medication for the patient - Adverse medication event - Safety   Recommendation: Change Medication - magnesium 200 MG Tabs   Status: Accepted - no CPA Needed   Identified Date: 4/21/2025 Completed Date: 4/21/2025   Note: stop diphenhydramine         Osteoporosis   1 Current Medication: CALCIUM CITRATE PO   Current Medication Sig: Take 300 mg by mouth 2 times daily. Take with meal that contains least amount of calcium   Rationale: Dose too low - Dosage too low - Effectiveness   Recommendation: Increase Dose   Status: Accepted - no CPA Needed   Identified Date: 4/21/2025 Completed Date: 4/21/2025

## 2025-04-28 ENCOUNTER — TELEPHONE (OUTPATIENT)
Dept: ORTHOPEDICS | Facility: CLINIC | Age: 85
End: 2025-04-28
Payer: COMMERCIAL

## 2025-04-28 ENCOUNTER — TELEPHONE (OUTPATIENT)
Dept: FAMILY MEDICINE | Facility: CLINIC | Age: 85
End: 2025-04-28
Payer: COMMERCIAL

## 2025-04-28 NOTE — TELEPHONE ENCOUNTER
Attempted to call patient back 2x (no answer during first time, 2nd attempt VM left w/ callback instructions).    Sneha Mack RN on 4/28/2025 at 10:38 AM

## 2025-04-28 NOTE — TELEPHONE ENCOUNTER
Other: Patient called and is at the dentist and is wondering if she needs anti biotics. Please call patient asap.     Could we send this information to you in [place] or would you prefer to receive a phone call?:   Patient would prefer a phone call   Okay to leave a detailed message?: Yes at Cell number on file:    Telephone Information:   Mobile [phone]

## 2025-04-28 NOTE — TELEPHONE ENCOUNTER
Called patient back. She ended up cancelling her dental visit today to be safe. Reminded patient ortho dental prophylaxis protocol and recommended she wait 6 months after surgery to reschedule her routine cleaning appointment. Advised her to call or MyChart about a week prior to that appt time and we will send dental antibiotics per protocol to her pharmacy.  She verbalized good understanding and has no further questions.    Sneha Mack RN on 4/28/2025 at 11:22 AM

## 2025-04-28 NOTE — TELEPHONE ENCOUNTER
Other: Returning call from Sneha- please call again. Available anytime now     Could we send this information to you in [place] or would you prefer to receive a phone call?:   Patient would prefer a phone call   Okay to leave a detailed message?: No at Cell number on file:    Telephone Information:   Mobile [phone]

## 2025-04-28 NOTE — TELEPHONE ENCOUNTER
Patient calls asking to speak to orthopedic surgery team. She states she was transferred incorrectly. She is asking if she needs antibiotics prior to dental work as she had a hip replacement recently. Warm transferred to ortho team to assist in answering medication question.    Chloé Dubose RN on 4/28/2025 at 10:10 AM

## 2025-05-05 ASSESSMENT — HOOS JR
WALKING ON UNEVEN SURFACE: MODERATE
HOOS JR TOTAL INTERVAL SCORE: 67.52
BENDING TO THE FLOOR TO PICK UP OBJECT: MODERATE
GOING UP OR DOWN STAIRS: MODERATE
LYING IN BED (TURNING OVER, MAINTAINING HIP POSITION): MILD

## 2025-05-07 ENCOUNTER — OFFICE VISIT (OUTPATIENT)
Dept: ORTHOPEDICS | Facility: CLINIC | Age: 85
End: 2025-05-07
Payer: COMMERCIAL

## 2025-05-07 ENCOUNTER — ANCILLARY PROCEDURE (OUTPATIENT)
Dept: GENERAL RADIOLOGY | Facility: CLINIC | Age: 85
End: 2025-05-07
Attending: STUDENT IN AN ORGANIZED HEALTH CARE EDUCATION/TRAINING PROGRAM
Payer: COMMERCIAL

## 2025-05-07 DIAGNOSIS — Z47.89 ORTHOPEDIC AFTERCARE: Primary | ICD-10-CM

## 2025-05-07 DIAGNOSIS — Z47.89 ORTHOPEDIC AFTERCARE: ICD-10-CM

## 2025-05-07 PROCEDURE — 99213 OFFICE O/P EST LOW 20 MIN: CPT | Performed by: STUDENT IN AN ORGANIZED HEALTH CARE EDUCATION/TRAINING PROGRAM

## 2025-05-07 PROCEDURE — 73502 X-RAY EXAM HIP UNI 2-3 VIEWS: CPT | Mod: TC | Performed by: RADIOLOGY

## 2025-05-07 NOTE — PATIENT INSTRUCTIONS
1. Orthopedic aftercare          Physical [facility].  You can call [phone]  to schedule at your convenience.           Call my office with any questions or concerns, [phone].

## 2025-05-07 NOTE — PROGRESS NOTES
[facility]  Orthopaedic Surgery Consultation by Eugenio Jeffries M.D.    Ginger Marshall MRN# [id number]   Age: 84 year old YOB: 1940     Requesting physician: Referred Self  Duy Leyva     Background history:  DX:  OSAS  Morbid obesity with a BMI of 35.2  Hyperlipidemia  Rheumatic mitral valve stenosis  Osteoporosis  Lumbar degenerative disc disease  Rheumatoid arthritis on methotrexate and upadacinitinib    TREATMENTS:  2008, rotator cuff repair shoulder  2008, total hip arthroplasty right  2009, total knee arthroplasty  2012, revision total hip arthroplasty right  2017, L3-L4 posterior fusion  2023, ORIF left distal radius fracture  12/13/2024,Left total hip arthroplasty, Dr. Jeffries           History of Present Illness:     85-year-old female presenting with chronic left hip pain in setting of osteoarthritis and rheumatoid arthritis who underwent left total hip arthroplasty on 12/13/2024.     5/07/25: Today patient presents approximately 6 months after the above-mentioned procedure.  She states she was doing very well after her procedure but 2 weeks ago without clear antecedent traumatic event she has been experiencing more anterior groin pain.  This pain is present during activities and weightbearing.  It is worse when flexing the hip forward.  She states that the pain waxes and wanes and is not progressive.  She denies any fevers or chills.  She does not report any instability or weakness.  No low back pain.    Social:   Occupation: retired  Living situation: lives alone in apartment   Hobbies / Sports: none    Smoking: No  Alcohol: Yes  Illicit drug use: No         Physical Exam:     EXAMINATION pertinent findings:   PSYCH: Pleasant, healthy-appearing, alert, oriented x3, cooperative. Normal mood and affect.  VITAL SIGNS: not currently breastfeeding.  Reviewed nursing intake notes.   There is no height or weight on file to calculate BMI.  RESP: non labored breathing   ABD:  benign, soft, non-tender, no acute peritoneal findings  SKIN: grossly normal   LYMPHATIC: grossly normal, no adenopathy, no extremity edema  NEURO: grossly normal , no motor deficits  VASCULAR: satisfactory perfusion of all extremities   MUSCULOSKELETAL:   Alignment: Neutral  Gait: Antalgic over left lower extremity.     L hip: painless gentle ROM. Well healed incisional scar.  No tenderness to palpation over greater trochanteric region.  Hip flexor resistance testing is recognizably painful.  Tenderness in the trajectory of rectus femoris.     Left LE:   Thigh and leg compartments soft and compressible   +Quad/TA/GSC/FHL/EHL   SILT DP/SP/Nic/Saph/Tib nerve distributions   Palpable dorsalis pedis pulse     Right LE:   Thigh and leg compartments soft and compressible   +Quad/TA/GSC/FHL/EHL   SILT DP/SP/Nic/Saph/Tib nerve distributions   Palpable dorsalis pedis pulse    Marked right ankle swelling, painless   Exquisitely tender Adelaida's sign           Data:   All laboratory data reviewed  All imaging studies reviewed by me personally.    XR pelvis/hip left 5/7/2025:  My interpretation: Status post bilateral total hip arthroplasty.  Adequate sizing, orientation and fixation of components.  No clear signs of fractures of ramus inferior or superior.         Assessment and Plan:   Assessment:  84-year-old female status post left total hip arthroplasty.  Presenting with acute onset pain clinically most consistent with iliopsoas tendinitis or rectus femoris pathology.  No clear signs of fractures or failure of left total hip arthroplasty.    Plan:  The findings above were discussed at length with the patient during today's appointment.  I would recommend her trialing a 2-3-week course of Tylenol and ibuprofen, physical therapy and weightbearing as tolerated.  The symptoms did not improve she can return to clinic and we can trial an injection under ultrasound guidance in the iliopsoas tendon/bursa.  Patient understands and  agrees to the treatment plan as set forth.  All questions were answered.      Eugenio Jeffries MD, PhD     Adult Reconstruction  [facility] Department of Orthopaedic Surgery    This note was created using dictation software and may contain errors.  Please contact the creator for any clarifications that are needed.

## 2025-05-07 NOTE — LETTER
5/7/2025      Ginger Marshall  [address]  [place] [id number]      Dear Colleague,    Thank you for referring your patient, Ginger Marshall, to the [facility] [place]. Please see a copy of my visit note below.        [facility]  Orthopaedic Surgery Consultation by Eugenio Jeffries M.D.    Ginger Marshall MRN# [id number]   Age: 84 year old YOB: 1940     Requesting physician: Referred Self  Duy Leyva     Background history:  DX:  OSAS  Morbid obesity with a BMI of 35.2  Hyperlipidemia  Rheumatic mitral valve stenosis  Osteoporosis  Lumbar degenerative disc disease  Rheumatoid arthritis on methotrexate and upadacinitinib    TREATMENTS:  2008, rotator cuff repair shoulder  2008, total hip arthroplasty right  2009, total knee arthroplasty  2012, revision total hip arthroplasty right  2017, L3-L4 posterior fusion  2023, ORIF left distal radius fracture  12/13/2024,Left total hip arthroplasty, Dr. Jeffries           History of Present Illness:     85-year-old female presenting with chronic left hip pain in setting of osteoarthritis and rheumatoid arthritis who underwent left total hip arthroplasty on 12/13/2024.     5/07/25: Today patient presents approximately 6 months after the above-mentioned procedure.  She states she was doing very well after her procedure but 2 weeks ago without clear antecedent traumatic event she has been experiencing more anterior groin pain.  This pain is present during activities and weightbearing.  It is worse when flexing the hip forward.  She states that the pain waxes and wanes and is not progressive.  She denies any fevers or chills.  She does not report any instability or weakness.  No low back pain.    Social:   Occupation: retired  Living situation: lives alone in apartment   Hobbies / Sports: none    Smoking: No  Alcohol: Yes  Illicit drug use: No         Physical Exam:     EXAMINATION pertinent findings:   PSYCH: Pleasant,  healthy-appearing, alert, oriented x3, cooperative. Normal mood and affect.  VITAL SIGNS: not currently breastfeeding.  Reviewed nursing intake notes.   There is no height or weight on file to calculate BMI.  RESP: non labored breathing   ABD: benign, soft, non-tender, no acute peritoneal findings  SKIN: grossly normal   LYMPHATIC: grossly normal, no adenopathy, no extremity edema  NEURO: grossly normal , no motor deficits  VASCULAR: satisfactory perfusion of all extremities   MUSCULOSKELETAL:   Alignment: Neutral  Gait: Antalgic over left lower extremity.     L hip: painless gentle ROM. Well healed incisional scar.  No tenderness to palpation over greater trochanteric region.  Hip flexor resistance testing is recognizably painful.  Tenderness in the trajectory of rectus femoris.     Left LE:   Thigh and leg compartments soft and compressible   +Quad/TA/GSC/FHL/EHL   SILT DP/SP/Nic/Saph/Tib nerve distributions   Palpable dorsalis pedis pulse     Right LE:   Thigh and leg compartments soft and compressible   +Quad/TA/GSC/FHL/EHL   SILT DP/SP/Nic/Saph/Tib nerve distributions   Palpable dorsalis pedis pulse    Marked right ankle swelling, painless   Exquisitely tender Adelaida's sign           Data:   All laboratory data reviewed  All imaging studies reviewed by me personally.    XR pelvis/hip left 5/7/2025:  My interpretation: Status post bilateral total hip arthroplasty.  Adequate sizing, orientation and fixation of components.  No clear signs of fractures of ramus inferior or superior.         Assessment and Plan:   Assessment:  84-year-old female status post left total hip arthroplasty.  Presenting with acute onset pain clinically most consistent with iliopsoas tendinitis or rectus femoris pathology.  No clear signs of fractures or failure of left total hip arthroplasty.    Plan:  The findings above were discussed at length with the patient during today's appointment.  I would recommend her trialing a 2-3-week course  of Tylenol and ibuprofen, physical therapy and weightbearing as tolerated.  The symptoms did not improve she can return to clinic and we can trial an injection under ultrasound guidance in the iliopsoas tendon/bursa.  Patient understands and agrees to the treatment plan as set forth.  All questions were answered.      Eugenio Jeffries MD, PhD     Adult Reconstruction  [facility] Department of Orthopaedic Surgery    This note was created using dictation software and may contain errors.  Please contact the creator for any clarifications that are needed.         Again, thank you for allowing me to participate in the care of your patient.        Sincerely,        Eugenio Jeffries MD    Electronically signed

## 2025-05-10 DIAGNOSIS — H10.11 ALLERGIC CONJUNCTIVITIS, RIGHT: ICD-10-CM

## 2025-05-11 RX ORDER — TOBRAMYCIN AND DEXAMETHASONE 3; 1 MG/ML; MG/ML
SUSPENSION/ DROPS OPHTHALMIC
Qty: 5 ML | Refills: 0 | OUTPATIENT
Start: 2025-05-11

## 2025-05-18 DIAGNOSIS — H10.11 ALLERGIC CONJUNCTIVITIS, RIGHT: ICD-10-CM

## 2025-05-19 RX ORDER — OLOPATADINE HYDROCHLORIDE OPHTHALMIC 2 MG/ML
SOLUTION OPHTHALMIC
Qty: 2.5 ML | Refills: 1 | Status: SHIPPED | OUTPATIENT
Start: 2025-05-19

## 2025-05-21 ENCOUNTER — VIRTUAL VISIT (OUTPATIENT)
Dept: PHARMACY | Facility: CLINIC | Age: 85
End: 2025-05-21
Payer: COMMERCIAL

## 2025-05-21 DIAGNOSIS — M81.0 OSTEOPOROSIS, UNSPECIFIED OSTEOPOROSIS TYPE, UNSPECIFIED PATHOLOGICAL FRACTURE PRESENCE: Primary | ICD-10-CM

## 2025-05-21 DIAGNOSIS — G47.00 INSOMNIA, UNSPECIFIED TYPE: ICD-10-CM

## 2025-05-21 DIAGNOSIS — G62.9 NEUROPATHY: ICD-10-CM

## 2025-05-21 PROCEDURE — 99207 PR NO CHARGE LOS: CPT | Mod: 95 | Performed by: PHARMACIST

## 2025-05-21 RX ORDER — PERPHENAZINE 16 MG
600 TABLET ORAL DAILY
COMMUNITY

## 2025-05-21 NOTE — PATIENT INSTRUCTIONS
"Recommendations from today's MTM visit:                                                      Start the magnesium glycinate 240 mg once daily in the evening to see if that helps with legs and sleep.  After you have been on magnesium for a few weeks and have determined if that helps with sleep and legs then you can either keep taking if it was helpful or stop.   After you have tried the magnesium then you can try Alpha lipoic acid 600 mg ONCE daily to see if this helps with the neuropathy in your legs. I know I said twice daily in our visit but lets start with once daily for now. This usually takes 3-5 weeks to see effect so take it for a couple months before you decide if it helps or not.  Increase calcium to 2 tablets (600 mg) twice daily with meals. Can reduce by 1 tablet if having a glass of milk. You want to get 1200 mg daily divided to include what you get in your diet.  Have you blood pressure rechecked at the [place] pharmacy tomorrow. Your appointment is at 12:00. If it is above 140/90 please schedule follow-up with myself or Dr. Gordillo to discuss. We may want you to start checking your blood pressure at home.    Follow-up: Return in 13 weeks (on 8/20/2025).    It was great speaking with you today.  I value your experience and would be very thankful for your time in providing feedback in our clinic survey. In the next few days, you may receive an email or text message from [employer] with a link to a survey related to your  clinical pharmacist.\"     To schedule another MT appointment, please call the clinic directly or you may call the MT scheduling line at [phone] or toll-free at [phone].     My Clinical Pharmacist's contact information:                                                      Please feel free to contact me with any questions or concerns you have.      Alise Harley, PharmD  Medication Therapy Management Pharmacist  [facility] [address] - Monday and Wednesday 7:30 - 4:00    "

## 2025-05-21 NOTE — PROGRESS NOTES
Medication Therapy Management (MTM) Encounter    ASSESSMENT:                            Medication Adherence/Access: No issues identified.    Insomnia/neuropathy   Recommended she start taking magnesium glycinate as discussed at last visit for her leg cramps and may be able to help with sleep as well. She may also benefit from alpha lipoic acid 600 mg daily for neuropathy. Discussed trying this after she has tried the magnesium for a few weeks to see if that was helpful versus starting     Bone Health   Osteoporosis:   Recommended she increase her calcium intake so she is getting closer to 1200 mg of calcium per day.  Advised to cut back on supplements if she is having a glass of milk with a meal.    PLAN:                            Start the magnesium glycinate 240 mg once daily in the evening to see if that helps with legs and sleep.  After you have been on magnesium for a few weeks and have determined if that helps with sleep and legs then you can either keep taking if it was helpful or stop.   After you have tried the magnesium then you can try Alpha lipoic acid 600 mg ONCE daily to see if this helps with the neuropathy in your legs. I know I said twice daily in our visit but lets start with once daily for now. This usually takes 3-5 weeks to see effect so take it for a couple months before you decide if it helps or not.  Increase calcium to 2 tablets (600 mg) twice daily with meals. Can reduce by 1 tablet if having a glass of milk. You want to get 1200 mg daily divided to include what you get in your diet.  Have you blood pressure rechecked at the [facility] tomorrow. Your appointment is at 12:00. If it is above 140/90 please schedule follow-up with myself or Dr. Gordillo to discuss. We may want you to start checking your blood pressure at home.    Follow-up: Return in 13 weeks (on 8/20/2025).    SUBJECTIVE/OBJECTIVE:                          Ginger Marshall is a 84 year old female seen for a follow-up visit  from 4/21/25.       Reason for visit: insomnia and osteoporosis.    Allergies/ADRs: Reviewed in chart  Past Medical History: Reviewed in chart  Tobacco: She reports that she quit smoking about 26 years ago. Her smoking use included cigarettes. She started smoking about 67 years ago. She has a 41 pack-year smoking history. She has never used smokeless tobacco.  Alcohol: rarely  Assistive Devices: uses walker all the time.     Medication Adherence/Access: no issues reported. Her dog was really sick and she had to put her down last week. She has had a lot going on so has not started magnesium.     Insomnia /neuropathy  Magnesium glycinate 240 mg daily - has not started yet still needs to buy    Medication History: diphenhydramine 50 mg at bedtime stopped at last visit    She has tried melatonin in the past but can't recall the dose and doesn't feel like it helped. It was a while ago since she has tried it.  Patient reports trouble staying asleep and trouble falling asleep.  It will take 30-40 minutes to fall asleep but then she wakes up several times a night but unsure why. It is painful just above the ankles. She gets a feeling of electricity running through her legs and this is painful. This happens a few times a month. She doesn't think that the pain wakes her up. She doesn't feel like her legs are restless. During the day she feels tired all day and does take a nap in the afternoon.     She does use a CPAP every night.     Bone Health   Osteoporosis:   calcium 300 mg twice daily - increased at last visit but she did not start taking two of the 300 mg tablets twice daily  Vitamin D 1000 IU daily  Reclast infusion once yearly    Patient is not experiencing side effects. She is following with endocrinology  DEXA History: 2/6/24 - improved T scores from previous  Patient is getting 0-1 serving(s)/day of calcium in their diet.  Risk factors: post-menopausal and chronic PPI use  She does walk her dog about 6 times a day.  No other activities noted.         Today's Vitals: There were no vitals taken for this visit.  ----------------      I spent 34 minutes with this patient today.      A summary of these recommendations was sent via [employer].    Alise Harley, PharmD  Medication Therapy Management Pharmacist    Telemedicine Visit Details  The patient's medications can be safely assessed via a telemedicine encounter.  Type of service:  Video Conference via [employer]  Originating Location (pt. Location): Home    Distant Location (provider location):  On-site  Start Time: 10:01 AM  End Time: 10:36 AM     Medication Therapy Recommendations  Neuropathy   1 Rationale: Untreated condition - Needs additional medication therapy - Indication   Recommendation: Start Medication - alpha-lipoic acid 600 MG capsule   Status: Accepted - no CPA Needed   Identified Date: 5/21/2025 Completed Date: 5/21/2025         Osteoporosis   1 Current Medication: CALCIUM CITRATE PO   Current Medication Sig: Take 600 mg by mouth 2 times daily. Can reduce by 1 tablet if you are having one cup of milk with your meal.   Rationale: Incorrect administration - Dosage too low - Effectiveness   Recommendation: Increase Dose   Status: Patient Agreed - Adherence/Education   Identified Date: 5/21/2025 Completed Date: 5/21/2025

## 2025-05-22 ENCOUNTER — ALLIED HEALTH/NURSE VISIT (OUTPATIENT)
Dept: FAMILY MEDICINE | Facility: CLINIC | Age: 85
End: 2025-05-22
Payer: COMMERCIAL

## 2025-05-22 VITALS — DIASTOLIC BLOOD PRESSURE: 71 MMHG | SYSTOLIC BLOOD PRESSURE: 133 MMHG

## 2025-05-22 DIAGNOSIS — Z01.30 BP CHECK: Primary | ICD-10-CM

## 2025-05-22 NOTE — PROGRESS NOTES
Ginger Marshall was evaluated at [facility] on May 22, 2025 at which time her blood pressure was:    BP Readings from Last 1 Encounters:   05/22/25 133/71     No data recorded      Reviewed lifestyle modifications for blood pressure control and reduction: including making healthy food choices, managing weight, getting regular exercise, smoking cessation, reducing alcohol consumption, monitoring blood pressure regularly.     Symptoms: None    BP Goal:< 140/90 mmHg    BP Assessment:  BP at goal    Potential Reasons for BP too high: NA - Not applicable    BP Follow-Up Plan: Recheck BP in 6 months at pharmacy    Recommendation to Provider: none    Note completed by: Bo Vicente RPH

## 2025-06-05 ENCOUNTER — TELEPHONE (OUTPATIENT)
Dept: ORTHOPEDICS | Facility: CLINIC | Age: 85
End: 2025-06-05
Payer: COMMERCIAL

## 2025-06-05 DIAGNOSIS — Z96.642 STATUS POST TOTAL REPLACEMENT OF LEFT HIP: ICD-10-CM

## 2025-06-05 DIAGNOSIS — D84.9 IMMUNOCOMPROMISED: Primary | ICD-10-CM

## 2025-06-05 RX ORDER — AMOXICILLIN 500 MG/1
CAPSULE ORAL
Qty: 4 CAPSULE | Refills: 2 | Status: SHIPPED | OUTPATIENT
Start: 2025-06-05

## 2025-06-05 NOTE — TELEPHONE ENCOUNTER
Other: pt calling as she would like an antibiotic so she can go to the dentist.      [facility] or would you prefer to receive a phone call?:   Patient would prefer a phone call   Okay to leave a detailed message?: Yes at Cell number on file:    Telephone Information:   Mobile [phone]

## 2025-06-05 NOTE — TELEPHONE ENCOUNTER
Reviewed- patient immunocompromised. Spoke with patient, uses [facility] in [place]. No PCN allergy listed. She had no questions re: using the medication. Rx sent per protocol. Darshana Deleon RN

## 2025-06-09 ENCOUNTER — LAB (OUTPATIENT)
Dept: LAB | Facility: CLINIC | Age: 85
End: 2025-06-09
Payer: COMMERCIAL

## 2025-06-09 DIAGNOSIS — Z79.899 HIGH RISK MEDICATION USE: ICD-10-CM

## 2025-06-09 DIAGNOSIS — M05.79 RHEUMATOID ARTHRITIS INVOLVING MULTIPLE SITES WITH POSITIVE RHEUMATOID FACTOR (H): ICD-10-CM

## 2025-06-09 LAB
ALBUMIN SERPL BCG-MCNC: 4.5 G/DL (ref 3.5–5.2)
ALP SERPL-CCNC: 40 U/L (ref 40–150)
ALT SERPL W P-5'-P-CCNC: 18 U/L (ref 0–50)
AST SERPL W P-5'-P-CCNC: 23 U/L (ref 0–45)
BASOPHILS # BLD AUTO: 0 10E3/UL (ref 0–0.2)
BASOPHILS NFR BLD AUTO: 0 %
BILIRUB SERPL-MCNC: 0.5 MG/DL
BILIRUBIN DIRECT (ROCHE PRO & PURE): 0.2 MG/DL (ref 0–0.45)
CREAT SERPL-MCNC: 0.61 MG/DL (ref 0.51–0.95)
CRP SERPL-MCNC: <3 MG/L
EGFRCR SERPLBLD CKD-EPI 2021: 88 ML/MIN/1.73M2
EOSINOPHIL # BLD AUTO: 0.2 10E3/UL (ref 0–0.7)
EOSINOPHIL NFR BLD AUTO: 3 %
ERYTHROCYTE [DISTWIDTH] IN BLOOD BY AUTOMATED COUNT: 15.3 % (ref 10–15)
ERYTHROCYTE [SEDIMENTATION RATE] IN BLOOD BY WESTERGREN METHOD: 27 MM/HR (ref 0–30)
HCT VFR BLD AUTO: 36.2 % (ref 35–47)
HGB BLD-MCNC: 12 G/DL (ref 11.7–15.7)
IMM GRANULOCYTES # BLD: 0 10E3/UL
IMM GRANULOCYTES NFR BLD: 1 %
LYMPHOCYTES # BLD AUTO: 1.1 10E3/UL (ref 0.8–5.3)
LYMPHOCYTES NFR BLD AUTO: 22 %
MCH RBC QN AUTO: 34.1 PG (ref 26.5–33)
MCHC RBC AUTO-ENTMCNC: 33.1 G/DL (ref 31.5–36.5)
MCV RBC AUTO: 103 FL (ref 78–100)
MONOCYTES # BLD AUTO: 0.7 10E3/UL (ref 0–1.3)
MONOCYTES NFR BLD AUTO: 13 %
NEUTROPHILS # BLD AUTO: 3 10E3/UL (ref 1.6–8.3)
NEUTROPHILS NFR BLD AUTO: 61 %
PLATELET # BLD AUTO: 318 10E3/UL (ref 150–450)
PROT SERPL-MCNC: 7.7 G/DL (ref 6.4–8.3)
RBC # BLD AUTO: 3.52 10E6/UL (ref 3.8–5.2)
WBC # BLD AUTO: 4.9 10E3/UL (ref 4–11)

## 2025-06-09 PROCEDURE — 80076 HEPATIC FUNCTION PANEL: CPT

## 2025-06-09 PROCEDURE — 36415 COLL VENOUS BLD VENIPUNCTURE: CPT

## 2025-06-09 PROCEDURE — 82565 ASSAY OF CREATININE: CPT

## 2025-06-09 PROCEDURE — 86140 C-REACTIVE PROTEIN: CPT

## 2025-06-09 PROCEDURE — 85025 COMPLETE CBC W/AUTO DIFF WBC: CPT

## 2025-06-09 PROCEDURE — 85652 RBC SED RATE AUTOMATED: CPT

## 2025-06-09 PROCEDURE — 86481 TB AG RESPONSE T-CELL SUSP: CPT

## 2025-06-11 ENCOUNTER — RESULTS FOLLOW-UP (OUTPATIENT)
Dept: RHEUMATOLOGY | Facility: CLINIC | Age: 85
End: 2025-06-11

## 2025-06-11 LAB
GAMMA INTERFERON BACKGROUND BLD IA-ACNC: 0.06 IU/ML
M TB IFN-G BLD-IMP: NEGATIVE
M TB IFN-G CD4+ BCKGRND COR BLD-ACNC: 2.58 IU/ML
MITOGEN IGNF BCKGRD COR BLD-ACNC: 0 IU/ML
MITOGEN IGNF BCKGRD COR BLD-ACNC: 0.01 IU/ML
QUANTIFERON MITOGEN: 2.64 IU/ML
QUANTIFERON NIL TUBE: 0.06 IU/ML
QUANTIFERON TB1 TUBE: 0.06 IU/ML
QUANTIFERON TB2 TUBE: 0.07

## 2025-06-24 ENCOUNTER — ANCILLARY PROCEDURE (OUTPATIENT)
Dept: GENERAL RADIOLOGY | Facility: CLINIC | Age: 85
End: 2025-06-24
Attending: PODIATRIST
Payer: COMMERCIAL

## 2025-06-24 ENCOUNTER — OFFICE VISIT (OUTPATIENT)
Dept: PODIATRY | Facility: CLINIC | Age: 85
End: 2025-06-24
Payer: COMMERCIAL

## 2025-06-24 DIAGNOSIS — M25.871 SESAMOIDITIS OF RIGHT FOOT: ICD-10-CM

## 2025-06-24 DIAGNOSIS — L84 PRE-ULCERATIVE CALLUSES: ICD-10-CM

## 2025-06-24 DIAGNOSIS — M79.671 RIGHT FOOT PAIN: ICD-10-CM

## 2025-06-24 DIAGNOSIS — M20.5X1 HALLUX LIMITUS, RIGHT: ICD-10-CM

## 2025-06-24 DIAGNOSIS — M79.671 RIGHT FOOT PAIN: Primary | ICD-10-CM

## 2025-06-24 DIAGNOSIS — M06.0A RHEUMATOID ARTHRITIS OF OTHER SITE WITH NEGATIVE RHEUMATOID FACTOR (H): ICD-10-CM

## 2025-06-24 PROCEDURE — 99214 OFFICE O/P EST MOD 30 MIN: CPT | Performed by: PODIATRIST

## 2025-06-24 PROCEDURE — 73630 X-RAY EXAM OF FOOT: CPT | Mod: TC | Performed by: RADIOLOGY

## 2025-06-24 NOTE — LETTER
6/24/2025      Ginger Marshall  [address]  [facility] [place] [facility] [address]      Dear Colleague,    Thank you for referring your patient, Ginger Marshall, to the [facility]. Please see a copy of my visit note below.    PATIENT HISTORY:   Ginger Marshall is a 85 year old female who presents to clinic for right foot pain.  States that she has been having pain for about 2 years.  Notes it is a sharp shooting pain when she walks.  Thinks it is her sesamoid bone under her big toe.  Hurts with standing driving walking.  Seems to get a bit callus to the area and shaves it down every few days.  Wondering what could be done for this.    Review of Systems:  Patient denies fever, chills, rash, wound,  numbness, weakness, heart burn, blood in stool, chest pain with activity, calf pain when walking, shortness of breath with activity, chronic cough, easy bleeding/bruising, swelling of ankles, excessive thirst, fatigue, depression, anxiety.  Patient admits to limping at times, stiffness.     PAST MEDICAL HISTORY:   Past Medical History:   Diagnosis Date     Acute posthemorrhagic anemia 10/13/2012     Closed fracture of multiple ribs of left side, initial encounter 11/25/2019     Closed fracture of olecranon process of right ulna with routine healing 02/11/2023     Closed fracture of shaft of left ulna, unspecified fracture morphology, initial encounter 02/11/2023     COPD (chronic obstructive pulmonary disease) (H) 1980's    no longer occurring     Depressive disorder      Ex-smoker 01/1999     Gastroenteritis 03/21/2020    Gastroenteritis with norovirus     Herniated nucleus pulposus, L3-4 03/01/2017     Hip joint replacement by other means 07/10/2008     History of blood transfusion      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     History of total knee replacement 07/23/2009     Menopause 1989    late 40's     Migraine 04/27/2014    resolved     Multinodular goiter      Other chronic pain     joints      Other closed intra-articular fracture of distal end of left radius, initial encounter 2023     Pelvic fracture (H) 2014     Rheumatic mitral stenosis      Rheumatoid arteritis (H)      S/P lumbar fusion 2017     Sleep apnea     uses cpap     Vitamin B12 deficiency         PAST SURGICAL HISTORY:   Past Surgical History:   Procedure Laterality Date     ABDOMEN SURGERY      c sections     ARTHROPLASTY HIP Left 2024    Procedure: Left hybrid total hip arthroplasty;  Surgeon: Eugenio Jeffries MD;  Location:  OR     ARTHROSCOPY KNEE Left      ARTHROSCOPY KNEE RT/LT  2006    left     BACK SURGERY      disc     BIOPSY BREAST       BREAST SURGERY      lumpectomy s     BREAST SURGERY      lumpectomy     CATARACT EXTRACTION Bilateral      CATARACT IOL, RT/LT Bilateral 2010 aproximately      SECTION  1966      SECTION  1972     COLONOSCOPY  2009,     COLONOSCOPY       FINGER SURGERY Right 2019    Procedure: DISTAL ULNA RESECTION, RIGHT MIDDLE SILASTIC METACARPALPHALANGEAL EXCHANGE AND RIGHT ELBOW NODULE EXCISION.;  Surgeon: Hilario Redmond MD;  Location: [facility];  Service: Orthopedics     FOOT SURGERY Left      GYN SURGERY  66,72     HAND SURGERY Right      HAND SURGERY Right      HC ESOPH/GAS REFLUX TEST W NASAL IMPED >1 HR  2012    Procedure:ESOPHAGEAL IMPEDENCE FUNCTION TEST WITH 24 HOUR PH GREATER THAN 1 HOUR; Surgeon:KAYKAY JULIO; Location:[facility]     IR LUMBAR EPIDURAL STEROID INJECTION       IR TRANSLAMINAR EPIDURAL LUMBAR INJ INCL IMAGING  2012    LESI L5-S1 at [facility]     LUMBAR FUSION       OPEN REDUCTION INTERNAL FIXATION ELBOW Right 2023    Procedure: OPEN REDUCTION INTERNAL FIXATION, RIGHT OLECRANON FRACTURE;  Surgeon: Pili Brock MD;  Location:  OR     OPEN REDUCTION INTERNAL FIXATION WRIST Left 2023    Procedure: OPEN REDUCTION INTERNAL FIXATION, LEFT DISTAL RADIUS FRACTURE;   Surgeon: Pili Brock MD;  Location:  OR     OPTICAL TRACKING SYSTEM FUSION POSTERIOR SPINE LUMBAR N/A 04/03/2017    Procedure: OPTICAL TRACKING SYSTEM FUSION SPINE POSTERIOR LUMBAR ONE LEVEL;  Surgeon: Anthony Michele MD;  Location:  OR     ORTHOPEDIC SURGERY  1991    left foot surgery     ORTHOPEDIC SURGERY  1995    R MCP surgery     ORTHOPEDIC SURGERY  2007    right knee total replacement     TOTAL HIP ARTHROPLASTY Right      TOTAL KNEE ARTHROPLASTY Left      TOTAL KNEE ARTHROPLASTY Right      ZZC ANESTH,TOTAL HIP ARTHROPLASTY  2010    Rt hip     ZZC HAND/FINGER SURGERY UNLISTED  11/2005    right hand     ZZC TOTAL KNEE ARTHROPLASTY  2006    left        MEDICATIONS:   Current Outpatient Medications:      acetaminophen (TYLENOL) 325 MG tablet, Take 2 tablets (650 mg) by mouth every 4 hours as needed for other (mild pain)., Disp: 100 tablet, Rfl: 0     albuterol (PROAIR HFA/PROVENTIL HFA/VENTOLIN HFA) 108 (90 Base) MCG/ACT inhaler, Inhale 2 puffs into the lungs every 6 hours as needed for shortness of breath, wheezing or cough (Patient not taking: Reported on 6/12/2025), Disp: , Rfl:      alpha-lipoic acid 600 MG capsule, Take 600 mg by mouth daily. (Patient not taking: Reported on 6/12/2025), Disp: , Rfl:      amoxicillin (AMOXIL) 500 MG capsule, Take 2 grams (4 capsules) by mouth one hour prior to each dental appointment., Disp: 4 capsule, Rfl: 2     Ascorbic Acid (VITAMIN C) 500 MG CAPS, Take 1 capsule by mouth daily. With iron, Disp: , Rfl:      atorvastatin (LIPITOR) 40 MG tablet, Take 1 tablet (40 mg) by mouth daily., Disp: 90 tablet, Rfl: 3     buPROPion (WELLBUTRIN XL) 150 MG 24 hr tablet, Take 1 tablet (150 mg) by mouth every morning., Disp: 90 tablet, Rfl: 3     CALCIUM CITRATE PO, Take 600 mg by mouth 2 times daily. Can reduce by 1 tablet if you are having one cup of milk with your meal., Disp: , Rfl:      carboxymethylcellulose PF (REFRESH PLUS) 0.5 % ophthalmic solution, Place 1  drop into both eyes 2 times daily as needed for dry eyes, Disp: , Rfl:      Cholecalciferol (VITAMIN D-3 PO), Take 1,000 Units by mouth daily., Disp: , Rfl:      cyanocobalamin (VITAMIN B-12) 1000 MCG tablet, Take 1,000 mcg by mouth daily., Disp: , Rfl:      Ferrous Gluconate 324 (37.5 Fe) MG TABS, Take 1 tablet by mouth daily Started 6/2023, Disp: , Rfl:      FT EYE ALLERGY ITCH RELIEF 0.2 % ophthalmic solution, INSTILL ONE DROP INTO BOTH EYES DAILY. USE MOSTLY IN THE SPRING, Disp: 2.5 mL, Rfl: 1     ipratropium (ATROVENT) 0.06 % nasal spray, Spray 2 sprays into both nostrils 4 times daily as needed for rhinitis (nasal drainage), Disp: 15 mL, Rfl: 11     leucovorin (WELLCOVORIN) 5 MG tablet, Take 1 tablet (5 mg) by mouth every 7 days . Take 24 hours after taking Methotrexate each week., Disp: 13 tablet, Rfl: 2     loratadine (CLARITIN) 10 MG tablet, Take 10 mg by mouth daily., Disp: , Rfl:      MAGNESIUM GLYCINATE PO, Take 240 mg by mouth every evening. (Patient not taking: Reported on 6/12/2025), Disp: , Rfl:      Methotrexate, PF, (RASUVO) 25 MG/0.5ML autoinjector, Inject 0.5 mLs (25 mg) subcutaneously every 7 days. Hold for signs of infection, and seek medical attention. Take every Thursday., Disp: 2 mL, Rfl: 9     pantoprazole (PROTONIX) 40 MG EC tablet, Take 1 tablet (40 mg) by mouth daily., Disp: 90 tablet, Rfl: 3     sertraline (ZOLOFT) 100 MG tablet, Take 1 tablet (100 mg) by mouth daily., Disp: 90 tablet, Rfl: 3     UNABLE TO FIND, MEDICATION NAME: Distilled water for CPAP, Disp: , Rfl:      upadacitinib ER (RINVOQ) 15 MG tablet, Take 1 tablet (15 mg) by mouth daily. . Hold for any infection and seek medical attention., Disp: 30 tablet, Rfl: 9     valACYclovir (VALTREX) 500 MG tablet, Take 1 tablet (500 mg) by mouth 2 times daily., Disp: 180 tablet, Rfl: 3     ALLERGIES:    Allergies   Allergen Reactions     Abatacept Other (See Comments)     Severe headaches  Migraine     Celebrex [Roche-2 Inhibitors  "(Sulfonamide)]      Ineffective     Celecoxib Unknown and Nausea     Levaquin [Levofloxacin] Fatigue     Severe body pain     Orencia [Abatacept]      Headache     Septra [Bactrim] Unknown     \"Anything with sulfa in it don't come near me\"     Sulfa Antibiotics Nausea and Vomiting     \"deathly ill\"  Allergic to everything with Sulfa in it.     Sulfamethoxazole-Trimethoprim Nausea and Nausea and Vomiting     Tramadol Other (See Comments)     Headache  Migraine headache     Valdecoxib Unknown and Nausea     Made me \"very very ill\", might of been \"cramping\"     Adhesive Tape Rash     Plastic tape  Plastic tapes;  Pt states she has tolerated tegaderm for IV sites as of 2024     Antihistamines, Chlorpheniramine-Type  [Antihistamines, Chlorpheniramine-Type] Anxiety and Other (See Comments)        SOCIAL HISTORY:   Social History     Socioeconomic History     Marital status: Single     Spouse name: Not on file     Number of children: 3     Years of education: Not on file     Highest education level: Not on file   Occupational History     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   Tobacco Use     Smoking status: Former     Current packs/day: 0.00     Average packs/day: 1 pack/day for 41.0 years (41.0 ttl pk-yrs)     Types: Cigarettes     Start date: 1958     Quit date: 1999     Years since quittin.4     Smokeless tobacco: Never     Tobacco comments:     former smoker   Vaping Use     Vaping status: Never Used   Substance and Sexual Activity     Alcohol use: Yes     Comment: Occasionally,  usually wine     Drug use: No     Sexual activity: Not Currently     Partners: Male     Comment: To old for all that   Other Topics Concern     Parent/sibling w/ CABG, MI or angioplasty before 65F 55M? No      Service Not Asked     Blood Transfusions Not Asked     Caffeine Concern Yes     Comment: She has 8 cups of coffee in the AM and is done by 8-8:30 AM.     Occupational Exposure Not Asked     " Hobby Hazards Not Asked     Sleep Concern Not Asked     Stress Concern Not Asked     Weight Concern Not Asked     Special Diet Not Asked     Back Care Not Asked     Exercise Yes     Comment: Sometimes.  Gordo Redd comes 3 times a week to her building.     Bike Helmet Not Asked     Seat Belt Not Asked     Self-Exams Not Asked   Social History Narrative    She lives in a a senior living apartment building.    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    4 children, 3  and 1 living daughter     Social Drivers of Health     Financial Resource Strain: Low Risk  (3/2/2025)    Financial Resource Strain      Within the past 12 months, have you or your family members you live with been unable to get utilities (heat, electricity) when it was really needed?: No   Food Insecurity: Low Risk  (3/2/2025)    Food Insecurity      Within the past 12 months, did you worry that your food would run out before you got money to buy more?: No      Within the past 12 months, did the food you bought just not last and you didn t have money to get more?: No   Recent Concern: Food Insecurity - High Risk (2024)    Food Insecurity      Within the past 12 months, did you worry that your food would run out before you got money to buy more?: Yes      Within the past 12 months, did the food you bought just not last and you didn t have money to get more?: Yes   Transportation Needs: Low Risk  (3/2/2025)    Transportation Needs      Within the past 12 months, has lack of transportation kept you from medical appointments, getting your medicines, non-medical meetings or appointments, work, or from getting things that you need?: No   Physical Activity: Inactive (3/2/2025)    Exercise Vital Sign      Days of Exercise per Week: 0 days      Minutes of Exercise per Session: 0 min   Stress: No Stress Concern Present (3/2/2025)    [place] [employer] - Occupational Stress Questionnaire      Feeling of Stress :  Only a little   Social Connections: Unknown (3/2/2025)    Social Connection and Isolation Panel [NHANES]      Frequency of Communication with Friends and Family: Not on file      Frequency of Social Gatherings with Friends and Family: Twice a week      Attends Buddhist Services: Not on file      Active Member of Clubs or Organizations: Not on file      Attends Club or Organization Meetings: Not on file      Marital Status: Not on file   Interpersonal Safety: Low Risk  (2024)    Interpersonal Safety      Do you feel physically and emotionally safe where you currently live?: Yes      Within the past 12 months, have you been hit, slapped, kicked or otherwise physically hurt by someone?: No      Within the past 12 months, have you been humiliated or emotionally abused in other ways by your partner or ex-partner?: No   Housing Stability: Low Risk  (3/2/2025)    Housing Stability      Do you have housing? : Yes      Are you worried about losing your housing?: No        FAMILY HISTORY:   Family History   Problem Relation Age of Onset     Arthritis Mother      Alzheimer Disease Mother      Hyperlipidemia Mother      Osteoporosis Mother      Heart Disease Father         MI ( from this)     Alcohol/Drug Father      Arthritis Sister      Hypertension Sister      Other Cancer Sister         Luekemia     Neurologic Disorder Sister         Schizophrenic     Hypertension Sister              Hyperlipidemia Sister      Mental Illness Sister         Bipolar     Glaucoma Daughter      Diabetes Son         type 1,      Diabetes Son         ,  cancer     Esophageal Cancer Son         Drank and smoked     Substance Abuse Son      Other Cancer Son         Esophageal     Diabetes Other         type 2     Hyperlipidemia Other         EXAM:Vitals: There were no vitals taken for this visit.  BMI= There is no height or weight on file to calculate BMI.    General appearance: Patient is alert and fully  cooperative with history & exam.  No sign of distress is noted during the visit.     Psychiatric: Affect is pleasant & appropriate.  Patient appears motivated to improve health.     Respiratory: Breathing is regular & unlabored while sitting.     HEENT: Hearing is intact to spoken word.  Speech is clear.  No gross evidence of visual impairment that would impact ambulation.     Dermatologic: Localized preulcerative hyperkeratotic lesion to the plantar aspect of the right  first metatarsal head.  Very painful on palpation.     Vascular: DP & PT pulses are intact & regular bilaterally.  No significant edema or varicosities noted.  CFT and skin temperature is normal to both lower extremities.     Neurologic: Lower extremity sensation is intact to light touch.  No evidence of weakness or contracture in the lower extremities.  No evidence of neuropathy.     Musculoskeletal: Patient is ambulatory without assistive device or brace.  Significant decreased range of motion with pain and crepitus to the first metatarsal phalangeal joint.    Radiographs: right foot xray - I personally reviewed the xrays.  Degenerative changes noted to the first metatarsal phalangeal joint.  Osteopenia is noted to the foot.  Prominent degenerative sesamoid bones to the plantar aspect of the first metatarsal head.  No fractures are noted.     ASSESSMENT:    Right foot pain  Pre-ulcerative calluses  Sesamoiditis of right foot  Hallux limitus, right  Rheumatoid arthritis of other site with negative rheumatoid factor (H)     Medical Decision Making/Plan:  Reviewed patient's chart in [facility].  Reviewed and discussed x-rays.  Given her rheumatoid and significant arthritis to the first metatarsal phalangeal joint the pressure from the sesamoid is causing a painful callus.  We talked about going in and removing this surgically.  This would be a same-day procedure.  She would be no weightbearing for 4 weeks until the stitches came out.  She would need  someone to bring her to surgery and then stay with her for the next few days.  We discussed that this may not completely prevent the callus from forming but will hopefully try to prevent an ulcer from forming in this area.  She would like to proceed with surgery.    Talked about risks including infection, numbness, continued pain, recurrence, need for further surgery, blood loss, blood clotting. You will scar.    Will put an order into my surgery scheduler.  She will need postop oral antibiotics after surgery    Patient risk factor: Patient is at medium risk for infection.     All questions were answered to patients satisfaction and they will call with further questions or concerns.       Tamra Lizama DPM, Podiatry/Foot and Ankle Surgery      Again, thank you for allowing me to participate in the care of your patient.        Sincerely,        Tamra Lizama DPM, Podiatry/Foot and Ankle Surgery    Electronically signed

## 2025-06-24 NOTE — PATIENT INSTRUCTIONS
Thank you for choosing [facility]!    DR LOBATO'S CLINIC:  [facility]   [address]   [place] [address]  [phone]    (Tues, Wed, Thur am)     [facility]  [address] [facility] [place] [address]  [phone]  (Every Friday)     TRIAGE LINE: [phone]  APPOINTMENTS: [phone]  RADIOLOGY: [phone]  SET UP SURGERY: [phone]  PHYSICAL THERAPY: [phone]   FAX NUMBER: [phone]  BILLING QUESTIONS: [phone]             Recommend Superfeet inserts the green color.  Can get these at [employer] or online.  Cut it out under the big toe area after wearing it for a few days where you see the indent to offload the callus.    GETTING READY FOR YOUR SURGERY  ONE-THREE WEEKS BEFORE  1. See your Family Doctor or Primary Care Doctor for a History and Physical. If you do not, we may need to change the date of your surgery.  2. Please see pre-surgical medications below to which medications need to be stopped before surgery and when.    TEN OR MORE DAYS BEFORE    1. [place] with the hospital. (For [facility])      By Phone: [phone].      By Internet: [email or web address]. [facility].      If you do not register by phone or online, we will call to help you register.    SAME DAY SURGERY PATIENTS  1. You will need a family member of friend to drive you home. If you do not have one the surgery will be cancelled or rescheduled.  2. You will need a responsible adult to stay with you that night after the surgery.       We will ask this person to listen to some instructions before you leave the hospital.  * If your child is having surgery, and you would like a tour of the hospital, please call: [phone].      DAY BEFORE SURGERY  1. DO NOT EAT OR DRINK ANYTHING AFTER MIDNIGHT THE NIGHT BEFORE YOUR SURGERY!   2. DO NOT DRINK ALCOHOL.  3. Do not take over the counter  drugs.  4. Some people need to have blood tests at the hospital. If you need blood tests, we will tell you in advance.  5. Take medications as directed by your doctor. You may take these with a small amount of water.  6. Do not chew gum, chew tobacco, or suck on hard candy the day of surgery.  7. Bring your insurance cards, a list of your medicines and co-pays you might need. Leave jewelry and other valuables at home.  8. If you received papers at your doctor's office, bring these with you to the surgery.    If you have questions about these instructions, please call: [phone]  Ask to speak with a pre-admitting nurse.    PRESURGICAL MEDICATIONS:  Certain prescription, over-the-counter, and herbal medications interfere with healing after an operation. The main concern relates to medications that increase bleeding at the surgical site. Excess blood under the incision results in poor wound healing, excess pain, increased scarring, and a higher risk of infection.    Some medications slow the healing process of bone. Medications can also interfere with the anesthesia drugs that keep you asleep during the operation. It is important to ensure that these medications are out of your system prior to the operation. The list below details a number of medications that are of concern. Pay special attention to how long you should avoid these medications before your operation. Please note that this list is not complete. You should ask your surgeon or pharmacist if you are uncertain about other medications. Any herbal supplement not listed should be discontinued at least one week prior to surgery.    Aspirin: Hold for one week prior to surgery and restart the day after surgery. This over the counter medication promotes bleeding.    Motrin / Ibuprofen / Aleve / Advil / NSAIDS:  Stop one week prior to surgery. These medications affect bleeding and may cause delay in bone healing. Avoid taking these medications for six weeks after  bone surgeries. Other procedures may allow you to restart sooner than 6 weeks after surgery.    Coumadin / Plavix: Your primary care provider will manage Coumadin in relation to surgery. Coumadin may result in excessive bleeding and may be adjusted before and after surgery.    Enbriel: Stop two weeks prior to surgery and restart two weeks after surgery. This medication can effect soft tissue healing and increases the risk of infection.    Remicade: Stop 8-12 weeks before surgery and restart two weeks after surgery. This medication can affect soft tissue healing and increases the risk of infection.    Humira: Stop 4 weeks before surgery and restart two weeks after surgery. This medication can affect soft tissue healing and increases the risk of infection.    Methotrexate: Stop one dose prior to surgery. This medication will be restarted when the wound appears to be healing well. Please ask your surgeon about restarting this medication when you are being seen in the office for wound checks.    Kava: Stop at least one day prior to surgery and may restart one day after surgery. Kava may increase the sedative effect of anesthetics that are given during the operation. Kava can also increase bleeding at the surgical site.    Ephedra (ma menjivar): Stop at least one day prior to surgery and may restart one day after surgery.  Ephedra may increase the risk of heart attack and stroke. This medication can also increase bleeding at the surgical site.    Bonnie's Wort: Stop at least five days before surgery and may restart one day after surgery. Bonnie's wort may diminish the effects of several drugs that are given during surgery.    Ginseng: Stop at least one week prior to surgery and may restart one day after surgery.  Ginseng lowers blood sugar and may increase bleeding at the surgical site.    Ginkgo: Stop 36 hours before surgery and may restart one day after surgery. Ginkgo may increase bleeding at the surgical  site.    Garlic: Stop at least one week prior to surgery and may restart one day after. Garlic may increase bleeding at the surgery site.    Valerian: Do a slow steady decrease in your daily dose over a period of 2-3 weeks before surgery to decrease the chance of withdrawal symptoms. Valerian may increase the sedative effect of anesthetics given during the operation.    Echinacea: There is no data on stopping echinacea prior to surgery. This medication though can be associated with allergic reactions and suppression of your immune system.    Vitamin E, Omega-3, Flax, Fish Oil, Glucosamine and Chondroitin: Stop 2 weeks prior to surgery and may restart one day after. These herbal medications can increase risk of bleeding at surgical site.     POST OPERATIVE HOME CARE INSTRUCTIONS  Activities: You should rest today. Stay off your feet as much as possible and keep your foot elevated above the level of your heart (about two pillow height). Wear your surgical shoe at all times when up. Limit walking to 5 to 10 minutes per hour over the next few days if your doctor has previously told you that you can put some weight on the foot after surgery, although limit the weight to your heel. If you are supposed to be non-weight bearing, that means NO WEIGHT AT ALL ON THE FOOT. Use an ice pack on the ankle while awake 20 minutes per hour to help decrease pain and swelling.     Discomfort: If a prescription for pain was given, take as directed. If no pain medication was ordered, you may take a non-prescription, non-aspirin pain medication. If the pain is not relieved by pain medication, call the clinic.     Incision and Dressing: Your surgical dressing is a sterile dressing and should be left in place until removed by your physician. Keep the dressing dry by covering it with a plastic bag for showers, taking baths with the surgical foot out of the tub, or sponge bathing. Some bleeding on the dressing should be expected. If however,  you notice active or excessive bleeding or a temperature over 100 degrees by mouth, call the clinic. Do not change dressing by yourself.  If the dressing becomes wet or dirty, please call the clinic as it may need a new sterile dressing applied. You may start getting the foot wet after the stitches are removed.     Do not wear regular shoes with a surgical bandage and/or external pins in your foot. Wear loose fitting clothing that easily will slip over the bandage and/or pins. Do not cover your surgical foot with blankets as they may damage the dressing/pins. Also, remember that dogs are not aware of your surgery. Please keep them away from the bandage/pins.   If your surgeon places external pins in your foot, you must keep the foot dry until the pins are removed at 6-8 weeks after the surgery. Pins should be covered with a dressing for protection. You should examine the pins and your skin often. Check for any spreading redness or yellow drainage from the pin areas. Do not apply ointment around the pins. Do not push a loose pin back into your foot. Please call the clinic if the pin is spinning or moving in and out. If the pins are bumped or loosened they may need to be removed early. This may affect your surgical outcome.   Please call the clinic if you feel there is a problem with your pins and/or surgical bandage.    TIPS FOR SUCCESSFUL HEALING  How you care for the surgical site is critically important to achieve a successful result after surgery. Avoidance of injury, infection, excess swelling, scar tissue and stiffness are highly dependent on the care you provide over the next six weeks. Please do not hesitate to call if you have questions or concerns.   Your foot requires significant rest and elevation. Sitting for long hours with your foot elevated, however, will create its own problems. Expect muscle aches, back pain, cramps, etc. Optimal posture, lumbar support, back exercises, ice and heat may all help  with your new aches and pains. Do not apply a heating pad to your foot or leg as this can cause increase swelling and pain. Rather use ice in those areas.   Pain medications cause drowsiness. You may frequently sleep during the day and then have trouble sleeping at night. Over the counter sleep aids might be more effective than narcotic pain medication to achieve a reasonable night's sleep.    Narcotic pain medications and inactivity lead to constipation. Limiting use of narcotics will help minimize this problem. The pain medications will not completely alleviate your pain. The purpose of pain pills is to take the edge off and help you get through the first few days. You can substitute Extra Strength Tylenol if pain is mild. Please note that narcotic pain pills usually contain acetaminophen (the active ingredient in Tylenol) so be careful to avoid the maximum dose of acetaminophen. You should take measures to avoid constipation by drinking plenty of water, eating lots of fruit and vegetables and taking the recommended dose of Metamucil or a similar fiber supplement. These measures should be continued for as long as you require narcotic pain medications and are inactive.     Showering is a major challenge. Your incision requires about three days to become sealed from water. Your bandage should not get wet and should not be removed. Do not attempt showering for the first three days. A sponge bath is preferred. You may attempt to shower on the fourth day after the operation. Your foot should be covered with a bag, tape and rubber bands. Double bagging is preferred. Standing in the shower with a bag on your foot is quite hazardous. A portable shower stool would be ideal. The bandage will need to be changed in the office if it becomes moistened. A moist bandage will not dry on its own. A moist dressing may lead to infection.   Stiffness will develop after any operation due to scarring. The scar tissue begins to form  immediately after the surgery. Inactivity can cause excess stiffness and may lead to blood clots in your legs. Frequent range of motion exercises will help decrease stiffness, blood clots, scar tissue and adhesions. Please call if you are unsure about these recommendations.   Good luck and best wishes on a prompt recovery. Healing is slow but an important step in your recovery. You are in control of the final result. Please use this time wisely. Please do not hesitate to call if you have questions, concerns or comments.    * If you have any post-operative questions or concerns regarding your procedure, call our triage team at the [facility] at [phone]  POTENTIAL COMPLICATIONS OF FOOT & ANKLE SURGERY  Undergoing a surgical procedure involves a certain amount of risk. Risks of complications vary depending on the complexity of the surgery and how you take care of the surgical area during the healing process. Complications can range from minor infection to death. Some complications are temporary while others will be permanent.  Your surgeon weighs the risk of complications vs the potential benefit of undergoing surgery. You need to consider your tolerance for unexpected problems as you decide whether to undergo surgery.    Foot and Ankle surgery involves cutting skin, bone, ligaments, blood vessels and joints.  These structures heal well but not without consequence. Any break to the skin can lead to infection. A deep infection involves bones or joints which can be devastating. Deep infection can lead to amputation or could spread to other parts of your body. Most infections are minor and easily treated with oral antibiotics. Infections are often times from bacteria already present on your skin. Proper care of the surgical site is an essential component of avoiding infection. Do not get the bandage wet and take proper care of external pins to avoid these problems.     Joint stiffness is  inherent to any foot or ankle surgery. Joint surgery is a major component of reconstructive foot and ankle procedures. The ligaments and tissues around the joint are cut, and later repaired. Scare tissue limits joint mobility. This can be permanent but generally improves over the course of one year.    Surgery involves dissection around nerves. Visible nerves are moved out of the way while very small nerves are cut. Scar tissue develops around nerves and can lead to nerve pain, numbness, or neuromas. Nerve symptoms can be permanent. This can lead to numbness or sometimes hypersensitivity to touch and problems wearing shoes.    Bones do not always heal after surgery. Poor healing after a bone cut or joint fusion can lead to an extended period of casting or repeat surgery. Electronic bone stimulators are sometimes used to stimulate poor healing of bone. Nonunion is when joint fusion does not take.  This can occur as often as 10% of the time. Smoking doubles your risk of poor bone healing to 20%.    Bone grafting is sometimes necessary during the original or subsequent surgery. Bone is sometimes taken from other parts of your body or freeze dried bone from a bone bank from a bone bank or synthetic bone material might be used.    A scar is always present after foot and ankle surgery. The scar will be visible and could be sensitive. Some people develop excessive scarring, which cannot be controlled by the surgeon. Scars can be unsightly and can restrict joint mobility.    Blood clots can develop in the calf after surgery. Foot and ankle surgery is a predisposing factor for blood clots. The blood clot could break and travel to your lung.  This condition can lead to death. Early warning signs could include calf swelling and pain, chest pain or shortness of breath. This is an emergency that requires immediate attention by a medical doctor!    Surgery will not necessarily create a pain-free foot. Even normal feet hurt.  Crooked toes, bunions, neuromas, flat feet and arthritis should all be considered permanent conditions.  Ankle pain commonly requires multiple surgeries over a lifetime. Do not assume that having surgery will permanently fix your condition. You may need permanent alteration in shoes and activities to accommodate your foot and ankle problem.    Careful attention to post-operative recommendations will dramatically reduce your risk of complications. Proper dressing, wound care, elevation and rest will be essential to get the wound healed and minimize scarring. Strict attention to activity restrictions, such as non-weight bearing, or partial weight bearing is essential. Internal fixation devices may not resist the stress of walking. Some select surgeries allow the patient to walk, however this should be very minimal.    Despite these concerns, foot and ankle surgery leads to a high level of patient satisfaction. Your surgeon would not recommend surgery if he/she did not expect your foot to improve. Talk to your surgeon about any of the above issues.

## 2025-06-24 NOTE — PROGRESS NOTES
PATIENT HISTORY:   Ginger Marshall is a 85 year old female who presents to clinic for right foot pain.  States that she has been having pain for about 2 years.  Notes it is a sharp shooting pain when she walks.  Thinks it is her sesamoid bone under her big toe.  Hurts with standing driving walking.  Seems to get a bit callus to the area and shaves it down every few days.  Wondering what could be done for this.    Review of Systems:  Patient denies fever, chills, rash, wound,  numbness, weakness, heart burn, blood in stool, chest pain with activity, calf pain when walking, shortness of breath with activity, chronic cough, easy bleeding/bruising, swelling of ankles, excessive thirst, fatigue, depression, anxiety.  Patient admits to limping at times, stiffness.     PAST MEDICAL HISTORY:   Past Medical History:   Diagnosis Date    Acute posthemorrhagic anemia 10/13/2012    Closed fracture of multiple ribs of left side, initial encounter 11/25/2019    Closed fracture of olecranon process of right ulna with routine healing 02/11/2023    Closed fracture of shaft of left ulna, unspecified fracture morphology, initial encounter 02/11/2023    COPD (chronic obstructive pulmonary disease) (H) 1980's    no longer occurring    Depressive disorder     Ex-smoker 01/1999    Gastroenteritis 03/21/2020    Gastroenteritis with norovirus    Herniated nucleus pulposus, L3-4 03/01/2017    Hip joint replacement by other means 07/10/2008    History of blood transfusion     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    History of total knee replacement 07/23/2009    Menopause 1989    late 40's    Migraine 04/27/2014    resolved    Multinodular goiter     Other chronic pain     joints    Other closed intra-articular fracture of distal end of left radius, initial encounter 02/11/2023    Pelvic fracture (H) 05/13/2014    Rheumatic mitral stenosis     Rheumatoid arteritis (H)     S/P lumbar fusion 04/03/2017    Sleep apnea     uses cpap     Vitamin B12 deficiency         PAST SURGICAL HISTORY:   Past Surgical History:   Procedure Laterality Date    ABDOMEN SURGERY      c sections    ARTHROPLASTY HIP Left 2024    Procedure: Left hybrid total hip arthroplasty;  Surgeon: Eugenio Jeffries MD;  Location:  OR    ARTHROSCOPY KNEE Left     ARTHROSCOPY KNEE RT/LT  2006    left    BACK SURGERY  2013    disc    BIOPSY BREAST      BREAST SURGERY      lumpectomy 's    BREAST SURGERY      lumpectomy    CATARACT EXTRACTION Bilateral     CATARACT IOL, RT/LT Bilateral 2010 aproximately     SECTION  1966     SECTION  1972    COLONOSCOPY  2009,    COLONOSCOPY      FINGER SURGERY Right 2019    Procedure: DISTAL ULNA RESECTION, RIGHT MIDDLE SILASTIC METACARPALPHALANGEAL EXCHANGE AND RIGHT ELBOW NODULE EXCISION.;  Surgeon: Hilario Redmond MD;  Location: [facility] OR;  Service: Orthopedics    FOOT SURGERY Left     GYN SURGERY  66,72    HAND SURGERY Right     HAND SURGERY Right     HC ESOPH/GAS REFLUX TEST W NASAL IMPED >1 HR  2012    Procedure:ESOPHAGEAL IMPEDENCE FUNCTION TEST WITH 24 HOUR PH GREATER THAN 1 HOUR; Surgeon:KAYKAY JULIO; Location: GI    IR LUMBAR EPIDURAL STEROID INJECTION      IR TRANSLAMINAR EPIDURAL LUMBAR INJ INCL IMAGING  2012    LESI L5-S1 at [facility]    LUMBAR FUSION      OPEN REDUCTION INTERNAL FIXATION ELBOW Right 2023    Procedure: OPEN REDUCTION INTERNAL FIXATION, RIGHT OLECRANON FRACTURE;  Surgeon: Pili Brock MD;  Location:  OR    OPEN REDUCTION INTERNAL FIXATION WRIST Left 2023    Procedure: OPEN REDUCTION INTERNAL FIXATION, LEFT DISTAL RADIUS FRACTURE;  Surgeon: Pili Brock MD;  Location:  OR    OPTICAL TRACKING SYSTEM FUSION POSTERIOR SPINE LUMBAR N/A 2017    Procedure: OPTICAL TRACKING SYSTEM FUSION SPINE POSTERIOR LUMBAR ONE LEVEL;  Surgeon: Anthony Michele MD;  Location:  OR    ORTHOPEDIC SURGERY       left foot surgery    ORTHOPEDIC SURGERY  1995    R MCP surgery    ORTHOPEDIC SURGERY  2007    right knee total replacement    TOTAL HIP ARTHROPLASTY Right     TOTAL KNEE ARTHROPLASTY Left     TOTAL KNEE ARTHROPLASTY Right     ZZC ANESTH,TOTAL HIP ARTHROPLASTY  2010    Rt hip    ZZC HAND/FINGER SURGERY UNLISTED  11/2005    right hand    ZZC TOTAL KNEE ARTHROPLASTY  2006    left        MEDICATIONS:   Current Outpatient Medications:     acetaminophen (TYLENOL) 325 MG tablet, Take 2 tablets (650 mg) by mouth every 4 hours as needed for other (mild pain)., Disp: 100 tablet, Rfl: 0    albuterol (PROAIR HFA/PROVENTIL HFA/VENTOLIN HFA) 108 (90 Base) MCG/ACT inhaler, Inhale 2 puffs into the lungs every 6 hours as needed for shortness of breath, wheezing or cough (Patient not taking: Reported on 6/12/2025), Disp: , Rfl:     alpha-lipoic acid 600 MG capsule, Take 600 mg by mouth daily. (Patient not taking: Reported on 6/12/2025), Disp: , Rfl:     amoxicillin (AMOXIL) 500 MG capsule, Take 2 grams (4 capsules) by mouth one hour prior to each dental appointment., Disp: 4 capsule, Rfl: 2    Ascorbic Acid (VITAMIN C) 500 MG CAPS, Take 1 capsule by mouth daily. With iron, Disp: , Rfl:     atorvastatin (LIPITOR) 40 MG tablet, Take 1 tablet (40 mg) by mouth daily., Disp: 90 tablet, Rfl: 3    buPROPion (WELLBUTRIN XL) 150 MG 24 hr tablet, Take 1 tablet (150 mg) by mouth every morning., Disp: 90 tablet, Rfl: 3    CALCIUM CITRATE PO, Take 600 mg by mouth 2 times daily. Can reduce by 1 tablet if you are having one cup of milk with your meal., Disp: , Rfl:     carboxymethylcellulose PF (REFRESH PLUS) 0.5 % ophthalmic solution, Place 1 drop into both eyes 2 times daily as needed for dry eyes, Disp: , Rfl:     Cholecalciferol (VITAMIN D-3 PO), Take 1,000 Units by mouth daily., Disp: , Rfl:     cyanocobalamin (VITAMIN B-12) 1000 MCG tablet, Take 1,000 mcg by mouth daily., Disp: , Rfl:     Ferrous Gluconate 324 (37.5 Fe) MG TABS, Take 1 tablet  "by mouth daily Started 6/2023, Disp: , Rfl:     FT EYE ALLERGY ITCH RELIEF 0.2 % ophthalmic solution, INSTILL ONE DROP INTO BOTH EYES DAILY. USE MOSTLY IN THE SPRING, Disp: 2.5 mL, Rfl: 1    ipratropium (ATROVENT) 0.06 % nasal spray, Spray 2 sprays into both nostrils 4 times daily as needed for rhinitis (nasal drainage), Disp: 15 mL, Rfl: 11    leucovorin (WELLCOVORIN) 5 MG tablet, Take 1 tablet (5 mg) by mouth every 7 days . Take 24 hours after taking Methotrexate each week., Disp: 13 tablet, Rfl: 2    loratadine (CLARITIN) 10 MG tablet, Take 10 mg by mouth daily., Disp: , Rfl:     MAGNESIUM GLYCINATE PO, Take 240 mg by mouth every evening. (Patient not taking: Reported on 6/12/2025), Disp: , Rfl:     Methotrexate, PF, (RASUVO) 25 MG/0.5ML autoinjector, Inject 0.5 mLs (25 mg) subcutaneously every 7 days. Hold for signs of infection, and seek medical attention. Take every Thursday., Disp: 2 mL, Rfl: 9    pantoprazole (PROTONIX) 40 MG EC tablet, Take 1 tablet (40 mg) by mouth daily., Disp: 90 tablet, Rfl: 3    sertraline (ZOLOFT) 100 MG tablet, Take 1 tablet (100 mg) by mouth daily., Disp: 90 tablet, Rfl: 3    UNABLE TO FIND, MEDICATION NAME: Distilled water for CPAP, Disp: , Rfl:     upadacitinib ER (RINVOQ) 15 MG tablet, Take 1 tablet (15 mg) by mouth daily. . Hold for any infection and seek medical attention., Disp: 30 tablet, Rfl: 9    valACYclovir (VALTREX) 500 MG tablet, Take 1 tablet (500 mg) by mouth 2 times daily., Disp: 180 tablet, Rfl: 3     ALLERGIES:    Allergies   Allergen Reactions    Abatacept Other (See Comments)     Severe headaches  Migraine    Celebrex [Roche-2 Inhibitors (Sulfonamide)]      Ineffective    Celecoxib Unknown and Nausea    Levaquin [Levofloxacin] Fatigue     Severe body pain    Orencia [Abatacept]      Headache    Septra [Bactrim] Unknown     \"Anything with sulfa in it don't come near me\"    Sulfa Antibiotics Nausea and Vomiting     \"deathly ill\"  Allergic to everything with Sulfa in " "it.    Sulfamethoxazole-Trimethoprim Nausea and Nausea and Vomiting    Tramadol Other (See Comments)     Headache  Migraine headache    Valdecoxib Unknown and Nausea     Made me \"very very ill\", might of been \"cramping\"    Adhesive Tape Rash     Plastic tape  Plastic tapes;  Pt states she has tolerated tegaderm for IV sites as of 2024    Antihistamines, Chlorpheniramine-Type  [Antihistamines, Chlorpheniramine-Type] Anxiety and Other (See Comments)        SOCIAL HISTORY:   Social History     Socioeconomic History    Marital status: Single     Spouse name: Not on file    Number of children: 3    Years of education: Not on file    Highest education level: Not on file   Occupational History    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   Tobacco Use    Smoking status: Former     Current packs/day: 0.00     Average packs/day: 1 pack/day for 41.0 years (41.0 ttl pk-yrs)     Types: Cigarettes     Start date: 1958     Quit date: 1999     Years since quittin.4    Smokeless tobacco: Never    Tobacco comments:     former smoker   Vaping Use    Vaping status: Never Used   Substance and Sexual Activity    Alcohol use: Yes     Comment: Occasionally,  usually wine    Drug use: No    Sexual activity: Not Currently     Partners: Male     Comment: To old for all that   Other Topics Concern    Parent/sibling w/ CABG, MI or angioplasty before 65F 55M? No     Service Not Asked    Blood Transfusions Not Asked    Caffeine Concern Yes     Comment: She has 8 cups of coffee in the AM and is done by 8-8:30 AM.    Occupational Exposure Not Asked    Hobby Hazards Not Asked    Sleep Concern Not Asked    Stress Concern Not Asked    Weight Concern Not Asked    Special Diet Not Asked    Back Care Not Asked    Exercise Yes     Comment: Sometimes.  [employer] comes 3 times a week to her building.    Bike Helmet Not Asked    Seat Belt Not Asked    Self-Exams Not Asked   Social History Narrative    She " lives in a a senior living apartment building.    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    4 children, 3  and 1 living daughter     Social Drivers of Health     Financial Resource Strain: Low Risk  (3/2/2025)    Financial Resource Strain     Within the past 12 months, have you or your family members you live with been unable to get utilities (heat, electricity) when it was really needed?: No   Food Insecurity: Low Risk  (3/2/2025)    Food Insecurity     Within the past 12 months, did you worry that your food would run out before you got money to buy more?: No     Within the past 12 months, did the food you bought just not last and you didn t have money to get more?: No   Recent Concern: Food Insecurity - High Risk (2024)    Food Insecurity     Within the past 12 months, did you worry that your food would run out before you got money to buy more?: Yes     Within the past 12 months, did the food you bought just not last and you didn t have money to get more?: Yes   Transportation Needs: Low Risk  (3/2/2025)    Transportation Needs     Within the past 12 months, has lack of transportation kept you from medical appointments, getting your medicines, non-medical meetings or appointments, work, or from getting things that you need?: No   Physical Activity: Inactive (3/2/2025)    Exercise Vital Sign     Days of Exercise per Week: 0 days     Minutes of Exercise per Session: 0 min   Stress: No Stress Concern Present (3/2/2025)    [employer] - Occupational Stress Questionnaire     Feeling of Stress : Only a little   Social Connections: Unknown (3/2/2025)    Social Connection and Isolation Panel [NHANES]     Frequency of Communication with Friends and Family: Not on file     Frequency of Social Gatherings with Friends and Family: Twice a week     Attends [employer] Services: Not on file     Active Member of Clubs or Organizations: Not on file     Attends Club or  Organization Meetings: Not on file     Marital Status: Not on file   Interpersonal Safety: Low Risk  (2024)    Interpersonal Safety     Do you feel physically and emotionally safe where you currently live?: Yes     Within the past 12 months, have you been hit, slapped, kicked or otherwise physically hurt by someone?: No     Within the past 12 months, have you been humiliated or emotionally abused in other ways by your partner or ex-partner?: No   Housing Stability: Low Risk  (3/2/2025)    Housing Stability     Do you have housing? : Yes     Are you worried about losing your housing?: No        FAMILY HISTORY:   Family History   Problem Relation Age of Onset    Arthritis Mother     Alzheimer Disease Mother     Hyperlipidemia Mother     Osteoporosis Mother     Heart Disease Father         MI ( from this)    Alcohol/Drug Father     Arthritis Sister     Hypertension Sister     Other Cancer Sister         Luekemia    Neurologic Disorder Sister         Schizophrenic    Hypertension Sister             Hyperlipidemia Sister     Mental Illness Sister         Bipolar    Glaucoma Daughter     Diabetes Son         type 1,     Diabetes Son         ,  cancer    Esophageal Cancer Son         Drank and smoked    Substance Abuse Son     Other Cancer Son         Esophageal    Diabetes Other         type 2    Hyperlipidemia Other         EXAM:Vitals: There were no vitals taken for this visit.  BMI= There is no height or weight on file to calculate BMI.    General appearance: Patient is alert and fully cooperative with history & exam.  No sign of distress is noted during the visit.     Psychiatric: Affect is pleasant & appropriate.  Patient appears motivated to improve health.     Respiratory: Breathing is regular & unlabored while sitting.     HEENT: Hearing is intact to spoken word.  Speech is clear.  No gross evidence of visual impairment that would impact ambulation.     Dermatologic: Localized  preulcerative hyperkeratotic lesion to the plantar aspect of the right  first metatarsal head.  Very painful on palpation.     Vascular: DP & PT pulses are intact & regular bilaterally.  No significant edema or varicosities noted.  CFT and skin temperature is normal to both lower extremities.     Neurologic: Lower extremity sensation is intact to light touch.  No evidence of weakness or contracture in the lower extremities.  No evidence of neuropathy.     Musculoskeletal: Patient is ambulatory without assistive device or brace.  Significant decreased range of motion with pain and crepitus to the first metatarsal phalangeal joint.    Radiographs: right foot xray - I personally reviewed the xrays.  Degenerative changes noted to the first metatarsal phalangeal joint.  Osteopenia is noted to the foot.  Prominent degenerative sesamoid bones to the plantar aspect of the first metatarsal head.  No fractures are noted.     ASSESSMENT:    Right foot pain  Pre-ulcerative calluses  Sesamoiditis of right foot  Hallux limitus, right  Rheumatoid arthritis of other site with negative rheumatoid factor (H)     Medical Decision Making/Plan:  Reviewed patient[facility] in [facility].  Reviewed and discussed x-rays.  Given her rheumatoid and significant arthritis to the first metatarsal phalangeal joint the pressure from the sesamoid is causing a painful callus.  We talked about going in and removing this surgically.  This would be a same-day procedure.  She would be no weightbearing for 4 weeks until the stitches came out.  She would need someone to bring her to surgery and then stay with her for the next few days.  We discussed that this may not completely prevent the callus from forming but will hopefully try to prevent an ulcer from forming in this area.  She would like to proceed with surgery.    Talked about risks including infection, numbness, continued pain, recurrence, need for further surgery, blood loss, blood clotting. You will  scar.    Will put an order into my surgery scheduler.  She will need postop oral antibiotics after surgery    Patient risk factor: Patient is at medium risk for infection.     All questions were answered to patients satisfaction and they will call with further questions or concerns.       Tamra Lizama DPM, Podiatry/Foot and Ankle Surgery

## 2025-06-26 ENCOUNTER — HOSPITAL ENCOUNTER (OUTPATIENT)
Facility: CLINIC | Age: 85
End: 2025-06-26
Attending: PODIATRIST | Admitting: PODIATRIST
Payer: COMMERCIAL

## 2025-06-27 RX ORDER — CEFAZOLIN SODIUM/WATER 2 G/20 ML
2 SYRINGE (ML) INTRAVENOUS SEE ADMIN INSTRUCTIONS
OUTPATIENT
Start: 2025-06-27

## 2025-06-27 RX ORDER — CEFAZOLIN SODIUM/WATER 2 G/20 ML
2 SYRINGE (ML) INTRAVENOUS
OUTPATIENT
Start: 2025-06-27

## 2025-07-07 ENCOUNTER — MYC MEDICAL ADVICE (OUTPATIENT)
Dept: PODIATRY | Facility: CLINIC | Age: 85
End: 2025-07-07
Payer: COMMERCIAL

## 2025-07-07 NOTE — TELEPHONE ENCOUNTER
Karan Miles,    Unfortunately that is not a procedure that we can just add on.  To straighten your toe, you would need a fusion of the big toe joint and we would have to have you come back into clinic to discuss that surgery as it is more involved and we would have to reschedule your surgery date to get enough time for those procedures.    If he wanted to proceed with this then we would have you follow-up with me in clinic for a 30-minute office visit to discuss.    Tamra Lizama DPM

## 2025-07-14 ENCOUNTER — NURSE TRIAGE (OUTPATIENT)
Dept: FAMILY MEDICINE | Facility: CLINIC | Age: 85
End: 2025-07-14
Payer: COMMERCIAL

## 2025-07-14 ENCOUNTER — HOSPITAL ENCOUNTER (EMERGENCY)
Facility: CLINIC | Age: 85
Discharge: HOME | End: 2025-07-14
Attending: EMERGENCY MEDICINE
Payer: COMMERCIAL

## 2025-07-14 ENCOUNTER — APPOINTMENT (OUTPATIENT)
Dept: GENERAL RADIOLOGY | Facility: CLINIC | Age: 85
End: 2025-07-14
Attending: EMERGENCY MEDICINE
Payer: COMMERCIAL

## 2025-07-14 VITALS
WEIGHT: 205.47 LBS | DIASTOLIC BLOOD PRESSURE: 84 MMHG | HEART RATE: 84 BPM | SYSTOLIC BLOOD PRESSURE: 148 MMHG | RESPIRATION RATE: 18 BRPM | HEIGHT: 66 IN | TEMPERATURE: 97.3 F | BODY MASS INDEX: 33.02 KG/M2 | OXYGEN SATURATION: 97 %

## 2025-07-14 DIAGNOSIS — M54.6 ACUTE LEFT-SIDED THORACIC BACK PAIN: ICD-10-CM

## 2025-07-14 DIAGNOSIS — R07.89 ATYPICAL CHEST PAIN: ICD-10-CM

## 2025-07-14 LAB
ANION GAP SERPL CALCULATED.3IONS-SCNC: 14 MMOL/L (ref 7–15)
ATRIAL RATE - MUSE: 70 BPM
BASOPHILS # BLD AUTO: 0 10E3/UL (ref 0–0.2)
BASOPHILS NFR BLD AUTO: 0 %
BUN SERPL-MCNC: 10.2 MG/DL (ref 8–23)
CALCIUM SERPL-MCNC: 9.5 MG/DL (ref 8.8–10.4)
CHLORIDE SERPL-SCNC: 103 MMOL/L (ref 98–107)
CREAT SERPL-MCNC: 0.65 MG/DL (ref 0.51–0.95)
DIASTOLIC BLOOD PRESSURE - MUSE: NORMAL MMHG
EGFRCR SERPLBLD CKD-EPI 2021: 87 ML/MIN/1.73M2
EOSINOPHIL # BLD AUTO: 0.1 10E3/UL (ref 0–0.7)
EOSINOPHIL NFR BLD AUTO: 2 %
ERYTHROCYTE [DISTWIDTH] IN BLOOD BY AUTOMATED COUNT: 14.3 % (ref 10–15)
GLUCOSE SERPL-MCNC: 105 MG/DL (ref 70–99)
HCO3 SERPL-SCNC: 25 MMOL/L (ref 22–29)
HCT VFR BLD AUTO: 36.3 % (ref 35–47)
HGB BLD-MCNC: 12.4 G/DL (ref 11.7–15.7)
HOLD SPECIMEN: NORMAL
HOLD SPECIMEN: NORMAL
IMM GRANULOCYTES # BLD: 0.1 10E3/UL
IMM GRANULOCYTES NFR BLD: 1 %
INTERPRETATION ECG - MUSE: NORMAL
LYMPHOCYTES # BLD AUTO: 0.8 10E3/UL (ref 0.8–5.3)
LYMPHOCYTES NFR BLD AUTO: 13 %
MCH RBC QN AUTO: 34.9 PG (ref 26.5–33)
MCHC RBC AUTO-ENTMCNC: 34.2 G/DL (ref 31.5–36.5)
MCV RBC AUTO: 102 FL (ref 78–100)
MONOCYTES # BLD AUTO: 0.9 10E3/UL (ref 0–1.3)
MONOCYTES NFR BLD AUTO: 14 %
NEUTROPHILS # BLD AUTO: 4.3 10E3/UL (ref 1.6–8.3)
NEUTROPHILS NFR BLD AUTO: 70 %
NRBC # BLD AUTO: 0 10E3/UL
NRBC BLD AUTO-RTO: 0 /100
P AXIS - MUSE: -21 DEGREES
PLATELET # BLD AUTO: 325 10E3/UL (ref 150–450)
POTASSIUM SERPL-SCNC: 4.4 MMOL/L (ref 3.4–5.3)
PR INTERVAL - MUSE: 166 MS
QRS DURATION - MUSE: 84 MS
QT - MUSE: 412 MS
QTC - MUSE: 444 MS
R AXIS - MUSE: 22 DEGREES
RBC # BLD AUTO: 3.55 10E6/UL (ref 3.8–5.2)
SODIUM SERPL-SCNC: 142 MMOL/L (ref 135–145)
SYSTOLIC BLOOD PRESSURE - MUSE: NORMAL MMHG
T AXIS - MUSE: 24 DEGREES
TROPONIN T SERPL HS-MCNC: 12 NG/L
TROPONIN T SERPL HS-MCNC: 13 NG/L
VENTRICULAR RATE- MUSE: 70 BPM
WBC # BLD AUTO: 6.2 10E3/UL (ref 4–11)

## 2025-07-14 PROCEDURE — 80048 BASIC METABOLIC PNL TOTAL CA: CPT | Performed by: EMERGENCY MEDICINE

## 2025-07-14 PROCEDURE — 85004 AUTOMATED DIFF WBC COUNT: CPT | Performed by: EMERGENCY MEDICINE

## 2025-07-14 PROCEDURE — 71046 X-RAY EXAM CHEST 2 VIEWS: CPT

## 2025-07-14 PROCEDURE — 93005 ELECTROCARDIOGRAM TRACING: CPT

## 2025-07-14 PROCEDURE — 36415 COLL VENOUS BLD VENIPUNCTURE: CPT | Performed by: EMERGENCY MEDICINE

## 2025-07-14 PROCEDURE — 84484 ASSAY OF TROPONIN QUANT: CPT | Performed by: EMERGENCY MEDICINE

## 2025-07-14 PROCEDURE — 99285 EMERGENCY DEPT VISIT HI MDM: CPT | Mod: 25 | Performed by: EMERGENCY MEDICINE

## 2025-07-14 ASSESSMENT — ACTIVITIES OF DAILY LIVING (ADL)
ADLS_ACUITY_SCORE: 59

## 2025-07-14 NOTE — ED PROVIDER NOTES
"  Emergency Department Note      History of Present Illness     Chief Complaint   Chest Pain    HPI   Ginger Marshall is a 85 year old female with a history of COPD, depressive disorder, and rheumatoid arthritis, who presents to the emergency department today for evaluation of chest pain. The patient reports she has a sharp, intermittent, pain in her back that takes her breath away when it flares up. This pain began on Friday, 2025, with it getting better on Saturday, before worsening yesterday and today. Ginger reports this pain is exacerbated by movement, and describes it as it \"feels like its pushing in.\" She notes that deep breaths do not exacerbate her pain. Ginger also endorses coughing and dizziness when moving from supine to sitting up. She notes that she has been taking Tylenol for her pain with no palliation of her pain. She denies any fevers, body aches, chills, pain or swelling in her legs, a history of blood clots in the lungs or legs, heart or lung problems, or any rashes.     Independent Historian   None    Review of External Notes   None    Past Medical History     Medical History and Problem List   Acute posthemorrhagic anemia  COPD  Closed fracture of multiple ribs of left side  Depressive disorder  Tobacco use (H)  Gastroenteritis  Herniated nucleus pulposus, L3-L4  Hip joint replacement  Blood transfusion (H)  Total knee replacement (H)  Menopause  Migraine  Multinodular goiter  Rheumatoid mitral stenosis  Rheumatoid arteritis  Lumbar fusion (H)  Sleep apnea  Vitamin B12 deficiency     Medications   albuterol  alpha-lipoic acid   amoxicillin   Ascorbic Acid   atorvastatin   buPROPion   CALCIUM CITRATE PO  carboxymethylcellulose PF   Cholecalciferol   cyanocobalamin  Ferrous Gluconate 324   FT EYE ALLERGY ITCH RELIEF   ipratropium   leucovorin  loratadine   MAGNESIUM GLYCINATE  Methotrexate  pantoprazole   sertraline   upadacitinib ER   valACYclovir     Surgical History    section " "(x2)  Arthroplasty hip (left)  Arthroplasty knee (left)  Back surgery  Breast lumpectomy  Cataract extraction  Colonoscopy (x2)  Finger surgery  Foot surgery  Esoph/gas reflux test with nasal impedence  Lumbar epidural steroid injection  Lumbar fusion  Open reduction internal fixtaion elbow  Open reduction internal fixation wrist  Optical tracking system fusion posterior spine lumbar    Physical Exam     Patient Vitals for the past 24 hrs:   BP Temp Temp src Pulse Resp SpO2 Height Weight   07/14/25 1426 (!) 148/84 -- -- 84 18 97 % -- --   07/14/25 0838 (!) 144/79 97.3  F (36.3  C) Temporal 77 18 94 % 1.664 m (5' 5.5\") 93.2 kg (205 lb 7.5 oz)     Physical Exam  [employer] [facility] [place]     Lab Results   Labs Ordered and Resulted from Time of ED Arrival to Time of ED Departure   BASIC METABOLIC PANEL - Abnormal       Result Value    Sodium 142      Potassium 4.4      Chloride 103      Carbon Dioxide (CO2) 25      Anion Gap 14      Urea Nitrogen 10.2      Creatinine 0.65      GFR Estimate 87      Calcium 9.5      Glucose 105 (*)    CBC WITH PLATELETS AND DIFFERENTIAL - Abnormal    WBC Count 6.2      RBC Count 3.55 (*)     Hemoglobin 12.4      Hematocrit 36.3       (*)     MCH 34.9 (*)     MCHC 34.2      RDW 14.3      Platelet Count 325      % Neutrophils 70      % Lymphocytes 13      % Monocytes 14      % Eosinophils 2      % Basophils 0      % Immature Granulocytes 1      NRBCs per 100 WBC 0      Absolute Neutrophils 4.3      Absolute Lymphocytes 0.8      Absolute Monocytes 0.9      Absolute Eosinophils 0.1      Absolute Basophils 0.0      Absolute Immature Granulocytes 0.1      Absolute NRBCs 0.0     TROPONIN T, HIGH SENSITIVITY - Normal    Troponin T, High Sensitivity 13     TROPONIN T, HIGH SENSITIVITY - Normal    Troponin T, High Sensitivity 12       Imaging   XR Chest 2 Views   Final Result   IMPRESSION: No focal airspace opacity. No pleural effusion or pneumothorax. Stable heart size and mediastinal " contours. Healed right rib fractures. Lower lumbar spinal fusion hardware.        EKG   ECG results from 07/14/25   EKG 12-lead, tracing only     Value    Systolic Blood Pressure     Diastolic Blood Pressure     Ventricular Rate 70    Atrial Rate 70    MN Interval 166    QRS Duration 84        QTc 444    P Axis -21    R AXIS 22    T Axis 24    Interpretation ECG      Sinus rhythm  Normal ECG  When compared with ECG of 20-Jun-2023 13:23,  No significant change was found  Unconfirmed report - interpretation of this ECG is computer generated - see medical record for final interpretation  Confirmed by - EMERGENCY ROOM, PHYSICIAN ([id number]),  JASE LEVI ([id number]) on 7/14/2025 9:46:22 AM       *Note: Due to a large number of results and/or encounters for the requested time period, some results have not been displayed. A complete set of results can be found in Results Review.     Independent Interpretation   CXR: No pneumothorax, infiltrate, or pleural effusion.    ED Course      Medications Administered   Medications - No data to display    Procedures   Procedures     Discussion of Management   None    ED Course   ED Course as of 07/14/25 1453   Mon Jul 14, 2025   0917 I obtained history and examined the patient as noted above.     1002 Troponin T, High Sensitivity: 13  Baseline but will trend   1407 I explained findings to the patient and we discussed plan for discharge. The patient is comfortable with this plan.     Additional Documentation  None    Medical Decision Making / Diagnosis     CMS Diagnoses: None    MIPS   None    MDM   Ginger Marshall is a 85 year old female presenting to the emergency department for left sided back chest pain.  On evaluation here, she is overall well-appearing with normal vital signs.  EKG shows no evidence of ischemia and her troponins are flat making ACS unlikely.  Chest x-ray shows no focal infiltrate to suggest a pneumonia.  Also no evidence of pneumothorax or widened  mediastinum to suggest dissection.  She is not hypoxic, tachycardic or tachypneic to suggest a PE.  She does note a history of pleurisy and that this feels somewhat similar to this could be a reexacerbation of this.  At this point, I do feel that she is stable for discharge home with close outpatient follow-up and she was comfortable with this plan.  She was given strict return precautions which she voiced understanding of.    Disposition   The patient was discharged.     Diagnosis     ICD-10-CM    1. Atypical chest pain  R07.89       2. Acute left-sided thoracic back pain  M54.6          Scribe Disclosure:  I, Davey Greco, am serving as a scribe at 10:03 AM on 7/14/2025 to document services personally performed by Nelson Isabel DO based on my observations and the provider's statements to me.        Nelson Isabel DO  07/14/25 2229

## 2025-07-14 NOTE — TELEPHONE ENCOUNTER
"Nurse Triage SBAR    Is this a 2nd Level Triage? NO    Situation: Call transferred to priority line to triage back/side chest wall pain    Background: 85 year old female with new onset upper left back/left side pain. Reports last time she had pain like this is was pleurisy.     Assessment: Upper back/left side chest wall pain started on Friday. Came on suddenly while doing dishes. Reports 9/10 and \"took her breath away\". Saturday felt find, however pain returned. Worse with movement, \"perfectly fine if laying down\". Reports pain is in left upper back and wraps around to left side of chest.  Also has felt \"woozy\" when moving from laying down to sitting up. Noticed new cough yesterday. Has taken tylenol which \"kind of calmed pain down a bit\" but still lingering.    Denies fever, numbness, tingling. Denies any recent injury.    Protocol Recommended Disposition:   Go to ED Now    Recommendation: Per RN protocol, advised patient to go to ED now. Patient reluctant but verbalized understanding and agreement in plan. Plans to call daughter to notify.        Daisy CARVER RN  7/14/2025 at 7:38 AM  [place]        Reason for Disposition   SEVERE back pain of sudden onset and age > 60 years    Additional Information   Negative: Passed out (e.g., fainted, lost consciousness, blacked out and was not responding)   Negative: Shock suspected (e.g., cold/pale/clammy skin, too weak to stand, low BP, rapid pulse)   Negative: Sounds like a life-threatening emergency to the triager   Negative: Major injury to the back (e.g., MVA, fall > 10 feet or 3 meters, penetrating injury, etc.)   Negative: Pain in the upper back over the ribs (rib cage) and worsened by coughing (or clearly increases with breathing)   Negative: Back pain during pregnancy    Protocols used: Back Pain-A-[place]    "

## 2025-07-14 NOTE — DISCHARGE INSTRUCTIONS
Here your EKG, labs and chest x-ray returned looking reassuring at this point.  Your x-ray shows no obvious signs of pneumonia.  I recommend that you take some scheduled Tylenol over the next few days for pain.  I also recommend that you follow-up very closely with your primary care provider to discuss your visit here.  Return to the emergency department at any point for any new or worsening symptoms or any other concerns.

## 2025-07-14 NOTE — ED TRIAGE NOTES
Pt comes in with left sided chest pain.  She states that it hurts to move and to breathe.  She states the pain started on Friday, she called the nurse triage line who sent her to the ED.  She states it feels better to lay down but when she gets up she gets dizzy.       Triage Assessment (Adult)       Row Name 07/14/25 0836          Triage Assessment    Airway WDL WDL        Respiratory WDL    Respiratory WDL X;rhythm/pattern     Rhythm/Pattern, Respiratory shortness of breath        Cardiac WDL    Cardiac WDL chest pain;X

## 2025-07-15 ENCOUNTER — VIRTUAL VISIT (OUTPATIENT)
Facility: CLINIC | Age: 85
End: 2025-07-15
Payer: COMMERCIAL

## 2025-07-15 DIAGNOSIS — F33.1 MODERATE EPISODE OF RECURRENT MAJOR DEPRESSIVE DISORDER (H): Primary | ICD-10-CM

## 2025-07-15 PROCEDURE — 90837 PSYTX W PT 60 MINUTES: CPT | Mod: 95

## 2025-07-15 ASSESSMENT — PATIENT HEALTH QUESTIONNAIRE - PHQ9
10. IF YOU CHECKED OFF ANY PROBLEMS, HOW DIFFICULT HAVE THESE PROBLEMS MADE IT FOR YOU TO DO YOUR WORK, TAKE CARE OF THINGS AT HOME, OR GET ALONG WITH OTHER PEOPLE: NOT DIFFICULT AT ALL
SUM OF ALL RESPONSES TO PHQ QUESTIONS 1-9: 6
SUM OF ALL RESPONSES TO PHQ QUESTIONS 1-9: 6

## 2025-07-15 NOTE — PROGRESS NOTES
Discharge Summary  Multiple Sessions    Client Name: Ginger Marshall MRN#: [id number] YOB: 1940    Discharge Date:   July 15, 2025    Service Modality: Video Visit:      Provider verified identity through the following two step process.  Patient provided:  Patient is known previously to provider    Telemedicine Visit: The patient's condition can be safely assessed and treated via synchronous audio and visual telemedicine encounter.      Reason for Telemedicine Visit: Patient has requested telehealth visit    Originating Site (Patient Location): Patient's home    Distant Site (Provider Location): Provider Remote Setting- Home Office    Consent:  The patient/guardian has verbally consented to: the potential risks and benefits of telemedicine (video visit) versus in person care; bill my insurance or make self-payment for services provided; and responsibility for payment of non-covered services.   0    Distant Location (Provider):  Off-site    As the provider I attest to compliance with applicable laws and regulations related to telemedicine.    Service Type: Individual      Session Start Time: 10 am  Session End Time: 10:53 am      Session Length: 53     Session #: 24     Attendees: Client attended alone      Focus of Treatment Objective(s):  Client's presenting concerns included: Depressed Mood - grief loss, anger  Anxiety - stressors  Stage of Change at time of Discharge: MAINTENANCE (Working to maintain change, with risk of relapse)    Medication Adherence:  Yes    Chemical Use:  NA    Assessment: Current Emotional / Mental Status (status of significant symptoms):    Risk status (Self / Other harm or suicidal ideation)  Client denies current fears or concerns for personal safety.  Client denies current or recent suicidal ideation or behaviors.  Client denies current or recent homicidal ideation or behaviors.  Client denies current or recent self injurious behavior or  ideation.  Client denies other safety concerns.  A safety and risk management plan has not been developed at this time, however client was given the after-hours number should there be a change in any of these risk factors.    Appearance:   Appropriate   Eye Contact:   Good   Psychomotor Behavior: Normal   Attitude:   Cooperative   Orientation:   All  Speech   Rate / Production: Normal    Volume:  Normal   Mood:    Normal  Affect:    Appropriate   Thought Content:  Clear   Thought Form:  Coherent  Logical   Insight:   Good , Intellectual Insight, and Spiritual insight    DSM5 Diagnoses: (Sustained by DSM5 Criteria Listed Above)  Diagnoses: 296.32 (F33.1) Major Depressive Disorder, Recurrent Episode, Moderate With anxious distress  Psychosocial & Contextual Factors: trauma hx, current stressors, aging, health  Assessments Completed:  The following assessments were completed by patient for this visit:  PHQ9:       10/7/2024     7:09 PM 11/18/2024     9:10 PM 1/15/2025    12:07 PM 3/7/2025     6:32 AM 4/9/2025     2:40 PM 6/17/2025     9:04 AM 7/15/2025     8:38 AM   PHQ-9 SCORE   PHQ-9 Total Score MyChart 3 (Minimal depression) 5 (Mild depression) 6 (Mild depression) 9 (Mild depression) 8 (Mild depression) 3 (Minimal depression) 6 (Mild depression)   PHQ-9 Total Score 3 5  6  9  8  3  6        Patient-reported     GAD7:       7/6/2022     4:18 PM 8/5/2022     8:11 AM 11/16/2022     1:33 PM 6/5/2023     2:31 PM 2/28/2024    10:14 AM 4/2/2024     9:05 PM 5/31/2024     9:14 AM   SPRING-7 SCORE   Total Score 2 (minimal anxiety) 5 (mild anxiety) 2 (minimal anxiety)  5 (mild anxiety) 1 (minimal anxiety) 1 (minimal anxiety)   Total Score 2 5 2 3 5 1 1     PROMIS 10-Global Health (only subscores and total score):       12/14/2023     6:28 AM 3/13/2024     6:31 PM 7/7/2024     7:34 AM 9/1/2024    11:20 AM 1/11/2025    10:48 AM 1/24/2025     8:25 AM 5/5/2025     8:36 PM   PROMIS-10 Scores Only   Global Mental Health Score 13 11 13  10 9  14  12    Global Physical Health Score 12 11 12 9 11  12  13    PROMIS TOTAL - SUBSCORES 25 22 25 19 20  26  25        Patient-reported     [place] Suicide Severity Rating Scale (Lifetime/Recent)      12/5/2022    11:00 AM 11/15/2024    11:11 AM 12/13/2024    10:53 AM 7/14/2025     8:37 AM   [place] Suicide Severity Rating (Lifetime/Recent)   Q1 Wished to be Dead (Past Month) no  0-->no 0-->no 0-->no   Q2 Suicidal Thoughts (Past Month) no  0-->no 0-->no 0-->no   Q3 Suicidal Thought Method no       Q4 Suicidal Intent without Specific Plan no       Q5 Suicide Intent with Specific Plan no       Q6 Suicide Behavior (Lifetime) no  0-->no 0-->no 0-->no   Level of Risk per Screen low risk  no risks indicated  no risks indicated  no risks indicated       Data saved with a previous flowsheet row definition       Reason for Discharge:  Client is satisfied with progress      Aftercare Plan:  Client may resume counseling services at any time in the future by calling the [facility] Intake Office, [phone].      Rocío Arenas, LICSW  July 15, 2025

## 2025-07-16 ENCOUNTER — OFFICE VISIT (OUTPATIENT)
Dept: PODIATRY | Facility: CLINIC | Age: 85
End: 2025-07-16
Payer: COMMERCIAL

## 2025-07-16 VITALS — WEIGHT: 205 LBS | BODY MASS INDEX: 33.59 KG/M2

## 2025-07-16 DIAGNOSIS — M25.871 SESAMOIDITIS OF RIGHT FOOT: ICD-10-CM

## 2025-07-16 DIAGNOSIS — M79.671 RIGHT FOOT PAIN: Primary | ICD-10-CM

## 2025-07-16 DIAGNOSIS — M06.072 RHEUMATOID ARTHRITIS INVOLVING BOTH FEET WITH NEGATIVE RHEUMATOID FACTOR (H): ICD-10-CM

## 2025-07-16 DIAGNOSIS — L84 PRE-ULCERATIVE CALLUSES: ICD-10-CM

## 2025-07-16 DIAGNOSIS — M20.5X1 HALLUX LIMITUS, RIGHT: ICD-10-CM

## 2025-07-16 DIAGNOSIS — M06.071 RHEUMATOID ARTHRITIS INVOLVING BOTH FEET WITH NEGATIVE RHEUMATOID FACTOR (H): ICD-10-CM

## 2025-07-16 DIAGNOSIS — M19.071 ARTHRITIS OF RIGHT FOOT: ICD-10-CM

## 2025-07-16 PROCEDURE — 99214 OFFICE O/P EST MOD 30 MIN: CPT | Performed by: PODIATRIST

## 2025-07-16 NOTE — LETTER
7/16/2025      Ginger Marshall  [address]  [facility] [address]      Dear Colleague,    Thank you for referring your patient, Ginegr Marshall, to the [facility]OD[facility]. Please see a copy of my visit note below.    Podiatry / Foot and Ankle Surgery Progress Note    July 16, 2025    Subject: Patient was seen for discussion of fusion of her right great toe.  She is scheduled for tibial sesamoid removal in September and is wondering if we can fuse her right great toe as well given that it sticks up and rubs in her shoe.  And have    Objective:  Vitals: Wt 93 kg (205 lb)   BMI 33.59 kg/m    BMI= Body mass index is 33.59 kg/m .    General:  Patient is alert and orientated.  NAD.    Dermatologic: Localized preulcerative hyperkeratotic lesion to the plantar aspect of the right  first metatarsal head.  Very painful on palpation.     Vascular: DP & PT pulses are intact & regular bilaterally.  No significant edema or varicosities noted.  CFT and skin temperature is normal to both lower extremities.     Neurologic: Lower extremity sensation is intact to light touch.  No evidence of weakness or contracture in the lower extremities.  No evidence of neuropathy.     Musculoskeletal: Patient is ambulatory without assistive device or brace.  Significant decreased range of motion with pain and crepitus to the first metatarsal phalangeal joint.     Radiographs: right foot xray - I personally reviewed the xrays.  Degenerative changes noted to the first metatarsal phalangeal joint.  Osteopenia is noted to the foot.  Prominent degenerative sesamoid bones to the plantar aspect of the first metatarsal head.  No fractures are noted.     ASSESSMENT:    Right foot pain  Pre-ulcerative calluses  Sesamoiditis of right foot  Hallux limitus, right  Rheumatoid arthritis of other site with negative rheumatoid factor (H)     Medical Decision Making/Plan:  Reviewed patient's chart in [facility].  Reviewed and discussed  x-rays.  Given her rheumatoid and significant arthritis to the first metatarsal phalangeal joint the pressure from the sesamoid is causing a painful callus.  We talked about going in and removing this surgically.  This would be a same-day procedure.  She would be no weightbearing for 4 weeks until the stitches came out.  She would need someone to bring her to surgery and then stay with her for the next few days.  We discussed that this may not completely prevent the callus from forming but will hopefully try to prevent an ulcer from forming in this area.      She also notes that her great toe rubs in her shoe and is sore.  It is angled towards her second toe and she would like the IPJ fused as well.    Discussed that adding on this procedure would increase her postop course and she be nonweightbearing for 4 to 6 weeks followed by minimal weightbearing in the boot for 4 to 6 weeks.  She would like to proceed with this and we will contact our surgery scheduler to reschedule the patient.     Talked about risks including infection, numbness, continued pain, nonunion, recurrence, need for further surgery, blood loss, blood clotting. You will scar.     Will put an order into my surgery scheduler.  She will need postop oral antibiotics after surgery     Patient risk factor: Patient is at medium risk for infection.      All questions were answered to patients satisfaction and they will call with further questions or concerns.       Tamra Lizama DPM, Podiatry/Foot and Ankle Surgery      Again, thank you for allowing me to participate in the care of your patient.        Sincerely,        Tamra Lizama DPM, Podiatry/Foot and Ankle Surgery    Electronically signed

## 2025-07-16 NOTE — PROGRESS NOTES
Podiatry / Foot and Ankle Surgery Progress Note    July 16, 2025    Subject: Patient was seen for discussion of fusion of her right great toe.  She is scheduled for tibial sesamoid removal in September and is wondering if we can fuse her right great toe as well given that it sticks up and rubs in her shoe.  And have    Objective:  Vitals: Wt 93 kg (205 lb)   BMI 33.59 kg/m    BMI= Body mass index is 33.59 kg/m .    General:  Patient is alert and orientated.  NAD.    Dermatologic: Localized preulcerative hyperkeratotic lesion to the plantar aspect of the right  first metatarsal head.  Very painful on palpation.     Vascular: DP & PT pulses are intact & regular bilaterally.  No significant edema or varicosities noted.  CFT and skin temperature is normal to both lower extremities.     Neurologic: Lower extremity sensation is intact to light touch.  No evidence of weakness or contracture in the lower extremities.  No evidence of neuropathy.     Musculoskeletal: Patient is ambulatory without assistive device or brace.  Significant decreased range of motion with pain and crepitus to the first metatarsal phalangeal joint.     Radiographs: right foot xray - I personally reviewed the xrays.  Degenerative changes noted to the first metatarsal phalangeal joint.  Osteopenia is noted to the foot.  Prominent degenerative sesamoid bones to the plantar aspect of the first metatarsal head.  No fractures are noted.     ASSESSMENT:    Right foot pain  Pre-ulcerative calluses  Sesamoiditis of right foot  Hallux limitus, right  Rheumatoid arthritis of other site with negative rheumatoid factor (H)     Medical Decision Making/Plan:  Review[facility] in [facility].  Reviewed and discussed x-rays.  Given her rheumatoid and significant arthritis to the first metatarsal phalangeal joint the pressure from the sesamoid is causing a painful callus.  We talked about going in and removing this surgically.  This would be a same-day procedure.   She would be no weightbearing for 4 weeks until the stitches came out.  She would need someone to bring her to surgery and then stay with her for the next few days.  We discussed that this may not completely prevent the callus from forming but will hopefully try to prevent an ulcer from forming in this area.      She also notes that her great toe rubs in her shoe and is sore.  It is angled towards her second toe and she would like the IPJ fused as well.    Discussed that adding on this procedure would increase her postop course and she be nonweightbearing for 4 to 6 weeks followed by minimal weightbearing in the boot for 4 to 6 weeks.  She would like to proceed with this and we will contact our surgery scheduler to reschedule the patient.     Talked about risks including infection, numbness, continued pain, nonunion, recurrence, need for further surgery, blood loss, blood clotting. You will scar.     Will put an order into my surgery scheduler.  She will need postop oral antibiotics after surgery     Patient risk factor: Patient is at medium risk for infection.      All questions were answered to patients satisfaction and they will call with further questions or concerns.       Tamra Lizama DPM, Podiatry/Foot and Ankle Surgery

## 2025-07-16 NOTE — PATIENT INSTRUCTIONS
Thank you for choosing [facility] Podiatry / Foot & Ankle Surgery!    DR LOBATO'S CLINIC:  [facility]   [address]   [place] [address]  [phone]    (Tues, Wed, Thur am)     [facility]  [address] [facility] [place] [address]  [phone]  (Every Friday)     TRIAGE LINE: [phone]  APPOINTMENTS: [phone]  RADIOLOGY: [phone]  SET UP SURGERY: [phone]  PHYSICAL THERAPY: [phone]   FAX NUMBER: [phone]  BILLING QUESTIONS: [phone]       Follow up: Surgery      GETTING READY FOR YOUR SURGERY  ONE-THREE WEEKS BEFORE  1. See your Family Doctor or Primary Care Doctor for a History and Physical. If you do not, we may need to change the date of your surgery.  2. Please see pre-surgical medications below to which medications need to be stopped before surgery and when.    TEN OR MORE DAYS BEFORE    1. [place] with the hospital. (For [facility])      By Phone: [phone].      By Internet: [email or web address]. Choose [facility].      If you do not register by phone or online, we will call to help you register.    SAME DAY SURGERY PATIENTS  1. You will need a family member of friend to drive you home. If you do not have one the surgery will be cancelled or rescheduled.  2. You will need a responsible adult to stay with you that night after the surgery.       We will ask this person to listen to some instructions before you leave the hospital.  * If your child is having surgery, and you would like a tour of the hospital, please call: [phone].      DAY BEFORE SURGERY  1. DO NOT EAT OR DRINK ANYTHING AFTER MIDNIGHT THE NIGHT BEFORE YOUR SURGERY!   2. DO NOT DRINK ALCOHOL.  3. Do not take over the counter drugs.  4. Some people need to have blood tests at the hospital. If you need blood tests, we will tell you in advance.  5. Take medications as directed by your doctor. You may take these with a small amount of  water.  6. Do not chew gum, chew tobacco, or suck on hard candy the day of surgery.  7. Bring your insurance cards, a list of your medicines and co-pays you might need. Leave jewelry and other valuables at home.  8. If you received papers at your doctor's office, bring these with you to the surgery.    If you have questions about these instructions, please call: [phone]  Ask to speak with a pre-admitting nurse.    PRESURGICAL MEDICATIONS:  Certain prescription, over-the-counter, and herbal medications interfere with healing after an operation. The main concern relates to medications that increase bleeding at the surgical site. Excess blood under the incision results in poor wound healing, excess pain, increased scarring, and a higher risk of infection.    Some medications slow the healing process of bone. Medications can also interfere with the anesthesia drugs that keep you asleep during the operation. It is important to ensure that these medications are out of your system prior to the operation. The list below details a number of medications that are of concern. Pay special attention to how long you should avoid these medications before your operation. Please note that this list is not complete. You should ask your surgeon or pharmacist if you are uncertain about other medications. Any herbal supplement not listed should be discontinued at least one week prior to surgery.    Aspirin: Hold for one week prior to surgery and restart the day after surgery. This over the counter medication promotes bleeding.    Motrin / Ibuprofen / Aleve / Advil / NSAIDS:  Stop one week prior to surgery. These medications affect bleeding and may cause delay in bone healing. Avoid taking these medications for six weeks after bone surgeries. Other procedures may allow you to restart sooner than 6 weeks after surgery.    Coumadin / Plavix: Your primary care provider will manage Coumadin in relation to surgery. Coumadin may result in  excessive bleeding and may be adjusted before and after surgery.    Enbriel: Stop two weeks prior to surgery and restart two weeks after surgery. This medication can effect soft tissue healing and increases the risk of infection.    Remicade: Stop 8-12 weeks before surgery and restart two weeks after surgery. This medication can affect soft tissue healing and increases the risk of infection.    Humira: Stop 4 weeks before surgery and restart two weeks after surgery. This medication can affect soft tissue healing and increases the risk of infection.    Methotrexate: Stop one dose prior to surgery. This medication will be restarted when the wound appears to be healing well. Please ask your surgeon about restarting this medication when you are being seen in the office for wound checks.    Kava: Stop at least one day prior to surgery and may restart one day after surgery. Kava may increase the sedative effect of anesthetics that are given during the operation. Kava can also increase bleeding at the surgical site.    Ephedra (ma menjivar): Stop at least one day prior to surgery and may restart one day after surgery.  Ephedra may increase the risk of heart attack and stroke. This medication can also increase bleeding at the surgical site.    Bonnie's Wort: Stop at least five days before surgery and may restart one day after surgery. Bonnie's wort may diminish the effects of several drugs that are given during surgery.    Ginseng: Stop at least one week prior to surgery and may restart one day after surgery.  Ginseng lowers blood sugar and may increase bleeding at the surgical site.    Ginkgo: Stop 36 hours before surgery and may restart one day after surgery. Ginkgo may increase bleeding at the surgical site.    Garlic: Stop at least one week prior to surgery and may restart one day after. Garlic may increase bleeding at the surgery site.    Valerian: Do a slow steady decrease in your daily dose over a period of 2-3  weeks before surgery to decrease the chance of withdrawal symptoms. Valerian may increase the sedative effect of anesthetics given during the operation.    Echinacea: There is no data on stopping echinacea prior to surgery. This medication though can be associated with allergic reactions and suppression of your immune system.    Vitamin E, Omega-3, Flax, Fish Oil, Glucosamine and Chondroitin: Stop 2 weeks prior to surgery and may restart one day after. These herbal medications can increase risk of bleeding at surgical site.      POST OPERATIVE HOME CARE INSTRUCTIONS  Activities: You should rest today. Stay off your feet as much as possible and keep your foot elevated above the level of your heart (about two pillow height). Wear your surgical shoe at all times when up. Limit walking to 5 to 10 minutes per hour over the next few days if your doctor has previously told you that you can put some weight on the foot after surgery, although limit the weight to your heel. If you are supposed to be non-weight bearing, that means NO WEIGHT AT ALL ON THE FOOT. Use an ice pack on the ankle while awake 20 minutes per hour to help decrease pain and swelling.     Discomfort: If a prescription for pain was given, take as directed. If no pain medication was ordered, you may take a non-prescription, non-aspirin pain medication. If the pain is not relieved by pain medication, call the clinic.     Incision and Dressing: Your surgical dressing is a sterile dressing and should be left in place until removed by your physician. Keep the dressing dry by covering it with a plastic bag for showers, taking baths with the surgical foot out of the tub, or sponge bathing. Some bleeding on the dressing should be expected. If however, you notice active or excessive bleeding or a temperature over 100 degrees by mouth, call the clinic. Do not change dressing by yourself.  If the dressing becomes wet or dirty, please call the clinic as it may need a  new sterile dressing applied. You may start getting the foot wet after the stitches are removed.     Do not wear regular shoes with a surgical bandage and/or external pins in your foot. Wear loose fitting clothing that easily will slip over the bandage and/or pins. Do not cover your surgical foot with blankets as they may damage the dressing/pins. Also, remember that dogs are not aware of your surgery. Please keep them away from the bandage/pins.   If your surgeon places external pins in your foot, you must keep the foot dry until the pins are removed at 6-8 weeks after the surgery. Pins should be covered with a dressing for protection. You should examine the pins and your skin often. Check for any spreading redness or yellow drainage from the pin areas. Do not apply ointment around the pins. Do not push a loose pin back into your foot. Please call the clinic if the pin is spinning or moving in and out. If the pins are bumped or loosened they may need to be removed early. This may affect your surgical outcome.   Please call the clinic if you feel there is a problem with your pins and/or surgical bandage.    TIPS FOR SUCCESSFUL HEALING  How you care for the surgical site is critically important to achieve a successful result after surgery. Avoidance of injury, infection, excess swelling, scar tissue and stiffness are highly dependent on the care you provide over the next six weeks. Please do not hesitate to call if you have questions or concerns.   Your foot requires significant rest and elevation. Sitting for long hours with your foot elevated, however, will create its own problems. Expect muscle aches, back pain, cramps, etc. Optimal posture, lumbar support, back exercises, ice and heat may all help with your new aches and pains. Do not apply a heating pad to your foot or leg as this can cause increase swelling and pain. Rather use ice in those areas.   Pain medications cause drowsiness. You may frequently sleep  during the day and then have trouble sleeping at night. Over the counter sleep aids might be more effective than narcotic pain medication to achieve a reasonable night's sleep.    Narcotic pain medications and inactivity lead to constipation. Limiting use of narcotics will help minimize this problem. The pain medications will not completely alleviate your pain. The purpose of pain pills is to take the edge off and help you get through the first few days. You can substitute Extra Strength Tylenol if pain is mild. Please note that narcotic pain pills usually contain acetaminophen (the active ingredient in Tylenol) so be careful to avoid the maximum dose of acetaminophen. You should take measures to avoid constipation by drinking plenty of water, eating lots of fruit and vegetables and taking the recommended dose of Metamucil or a similar fiber supplement. These measures should be continued for as long as you require narcotic pain medications and are inactive.     Showering is a major challenge. Your incision requires about three days to become sealed from water. Your bandage should not get wet and should not be removed. Do not attempt showering for the first three days. A sponge bath is preferred. You may attempt to shower on the fourth day after the operation. Your foot should be covered with a bag, tape and rubber bands. Double bagging is preferred. Standing in the shower with a bag on your foot is quite hazardous. A portable shower stool would be ideal. The bandage will need to be changed in the office if it becomes moistened. A moist bandage will not dry on its own. A moist dressing may lead to infection.   Stiffness will develop after any operation due to scarring. The scar tissue begins to form immediately after the surgery. Inactivity can cause excess stiffness and may lead to blood clots in your legs. Frequent range of motion exercises will help decrease stiffness, blood clots, scar tissue and adhesions.  Please call if you are unsure about these recommendations.   Good luck and best wishes on a prompt recovery. Healing is slow but an important step in your recovery. You are in control of the final result. Please use this time wisely. Please do not hesitate to call if you have questions, concerns or comments.    * If you have any post-operative questions or concerns regarding your procedure, call our triage team at the [facility] at [phone]     GETTING READY FOR YOUR SURGERY  ONE-THREE WEEKS BEFORE  1. See your Family Doctor or Primary Care Doctor for a History and Physical. If you do not, we may need to change the date of your surgery.  2. Please see pre-surgical medications below to which medications need to be stopped before surgery and when.    TEN OR MORE DAYS BEFORE    1. [place] with the hospital. (For [facility])      By Phone: [phone].      By Internet: [email or web address]. Choose [facility].      If you do not register by phone or online, we will call to help you register.    SAME DAY SURGERY PATIENTS  1. You will need a family member of friend to drive you home. If you do not have one the surgery will be cancelled or rescheduled.  2. You will need a responsible adult to stay with you that night after the surgery.       We will ask this person to listen to some instructions before you leave the hospital.  * If your child is having surgery, and you would like a tour of the hospital, please call: [phone].      DAY BEFORE SURGERY  1. DO NOT EAT OR DRINK ANYTHING AFTER MIDNIGHT THE NIGHT BEFORE YOUR SURGERY!   2. DO NOT DRINK ALCOHOL.  3. Do not take over the counter drugs.  4. Some people need to have blood tests at the hospital. If you need blood tests, we will tell you in advance.  5. Take medications as directed by your doctor. You may take these with a small amount of water.  6. Do not chew gum, chew tobacco, or suck on hard candy the day of  surgery.  7. Bring your insurance cards, a list of your medicines and co-pays you might need. Leave jewelry and other valuables at home.  8. If you received papers at your doctor's office, bring these with you to the surgery.    If you have questions about these instructions, please call: [phone]  Ask to speak with a pre-admitting nurse.    PRESURGICAL MEDICATIONS:  Certain prescription, over-the-counter, and herbal medications interfere with healing after an operation. The main concern relates to medications that increase bleeding at the surgical site. Excess blood under the incision results in poor wound healing, excess pain, increased scarring, and a higher risk of infection.    Some medications slow the healing process of bone. Medications can also interfere with the anesthesia drugs that keep you asleep during the operation. It is important to ensure that these medications are out of your system prior to the operation. The list below details a number of medications that are of concern. Pay special attention to how long you should avoid these medications before your operation. Please note that this list is not complete. You should ask your surgeon or pharmacist if you are uncertain about other medications. Any herbal supplement not listed should be discontinued at least one week prior to surgery.    Aspirin: Hold for one week prior to surgery and restart the day after surgery. This over the counter medication promotes bleeding.    Motrin / Ibuprofen / Aleve / Advil / NSAIDS:  Stop one week prior to surgery. These medications affect bleeding and may cause delay in bone healing. Avoid taking these medications for six weeks after bone surgeries. Other procedures may allow you to restart sooner than 6 weeks after surgery.    Coumadin / Plavix: Your primary care provider will manage Coumadin in relation to surgery. Coumadin may result in excessive bleeding and may be adjusted before and after  surgery.    Enbriel: Stop two weeks prior to surgery and restart two weeks after surgery. This medication can effect soft tissue healing and increases the risk of infection.    Remicade: Stop 8-12 weeks before surgery and restart two weeks after surgery. This medication can affect soft tissue healing and increases the risk of infection.    Humira: Stop 4 weeks before surgery and restart two weeks after surgery. This medication can affect soft tissue healing and increases the risk of infection.    Methotrexate: Stop one dose prior to surgery. This medication will be restarted when the wound appears to be healing well. Please ask your surgeon about restarting this medication when you are being seen in the office for wound checks.    Kava: Stop at least one day prior to surgery and may restart one day after surgery. Kava may increase the sedative effect of anesthetics that are given during the operation. Kava can also increase bleeding at the surgical site.    Ephedra (ma menjivar): Stop at least one day prior to surgery and may restart one day after surgery.  Ephedra may increase the risk of heart attack and stroke. This medication can also increase bleeding at the surgical site.    [place]'s Wort: Stop at least five days before surgery and may restart one day after surgery. Bonnie's wort may diminish the effects of several drugs that are given during surgery.    Ginseng: Stop at least one week prior to surgery and may restart one day after surgery.  Ginseng lowers blood sugar and may increase bleeding at the surgical site.    Ginkgo: Stop 36 hours before surgery and may restart one day after surgery. Ginkgo may increase bleeding at the surgical site.    Garlic: Stop at least one week prior to surgery and may restart one day after. Garlic may increase bleeding at the surgery site.    Valerian: Do a slow steady decrease in your daily dose over a period of 2-3 weeks before surgery to decrease the chance of withdrawal  symptoms. Valerian may increase the sedative effect of anesthetics given during the operation.    Echinacea: There is no data on stopping echinacea prior to surgery. This medication though can be associated with allergic reactions and suppression of your immune system.    Vitamin E, Omega-3, Flax, Fish Oil, Glucosamine and Chondroitin: Stop 2 weeks prior to surgery and may restart one day after. These herbal medications can increase risk of bleeding at surgical site.     POTENTIAL COMPLICATIONS OF FOOT & ANKLE SURGERY  Undergoing a surgical procedure involves a certain amount of risk. Risks of complications vary depending on the complexity of the surgery and how you take care of the surgical area during the healing process. Complications can range from minor infection to death. Some complications are temporary while others will be permanent.  Your surgeon weighs the risk of complications vs the potential benefit of undergoing surgery. You need to consider your tolerance for unexpected problems as you decide whether to undergo surgery.    Foot and Ankle surgery involves cutting skin, bone, ligaments, blood vessels and joints.  These structures heal well but not without consequence. Any break to the skin can lead to infection. A deep infection involves bones or joints which can be devastating. Deep infection can lead to amputation or could spread to other parts of your body. Most infections are minor and easily treated with oral antibiotics. Infections are often times from bacteria already present on your skin. Proper care of the surgical site is an essential component of avoiding infection. Do not get the bandage wet and take proper care of external pins to avoid these problems.     Joint stiffness is inherent to any foot or ankle surgery. Joint surgery is a major component of reconstructive foot and ankle procedures. The ligaments and tissues around the joint are cut, and later repaired. Scare tissue limits joint  mobility. This can be permanent but generally improves over the course of one year.    Surgery involves dissection around nerves. Visible nerves are moved out of the way while very small nerves are cut. Scar tissue develops around nerves and can lead to nerve pain, numbness, or neuromas. Nerve symptoms can be permanent. This can lead to numbness or sometimes hypersensitivity to touch and problems wearing shoes.    Bones do not always heal after surgery. Poor healing after a bone cut or joint fusion can lead to an extended period of casting or repeat surgery. Electronic bone stimulators are sometimes used to stimulate poor healing of bone. Nonunion is when joint fusion does not take.  This can occur as often as 10% of the time. Smoking doubles your risk of poor bone healing to 20%.    Bone grafting is sometimes necessary during the original or subsequent surgery. Bone is sometimes taken from other parts of your body or freeze dried bone from a bone bank from a bone bank or synthetic bone material might be used.    A scar is always present after foot and ankle surgery. The scar will be visible and could be sensitive. Some people develop excessive scarring, which cannot be controlled by the surgeon. Scars can be unsightly and can restrict joint mobility.    Blood clots can develop in the calf after surgery. Foot and ankle surgery is a predisposing factor for blood clots. The blood clot could break and travel to your lung.  This condition can lead to death. Early warning signs could include calf swelling and pain, chest pain or shortness of breath. This is an emergency that requires immediate attention by a medical doctor!    Surgery will not necessarily create a pain-free foot. Even normal feet hurt. Crooked toes, bunions, neuromas, flat feet and arthritis should all be considered permanent conditions.  Ankle pain commonly requires multiple surgeries over a lifetime. Do not assume that having surgery will permanently  fix your condition. You may need permanent alteration in shoes and activities to accommodate your foot and ankle problem.    Careful attention to post-operative recommendations will dramatically reduce your risk of complications. Proper dressing, wound care, elevation and rest will be essential to get the wound healed and minimize scarring. Strict attention to activity restrictions, such as non-weight bearing, or partial weight bearing is essential. Internal fixation devices may not resist the stress of walking. Some select surgeries allow the patient to walk, however this should be very minimal.    Despite these concerns, foot and ankle surgery leads to a high level of patient satisfaction. Your surgeon would not recommend surgery if he/she did not expect your foot to improve. Talk to your surgeon about any of the above issues.    POST OPERATIVE HOME CARE INSTRUCTIONS  Activities: You should rest today. Stay off your feet as much as possible and keep your foot elevated above the level of your heart (about two pillow height). Wear your surgical shoe at all times when up. Limit walking to 5 to 10 minutes per hour over the next few days if your doctor has previously told you that you can put some weight on the foot after surgery, although limit the weight to your heel. If you are supposed to be non-weight bearing, that means NO WEIGHT AT ALL ON THE FOOT. Use an ice pack on the ankle while awake 20 minutes per hour to help decrease pain and swelling.     Discomfort: If a prescription for pain was given, take as directed. If no pain medication was ordered, you may take a non-prescription, non-aspirin pain medication. If the pain is not relieved by pain medication, call the clinic.     Incision and Dressing: Your surgical dressing is a sterile dressing and should be left in place until removed by your physician. Keep the dressing dry by covering it with a plastic bag for showers, taking baths with the surgical foot out  of the tub, or sponge bathing. Some bleeding on the dressing should be expected. If however, you notice active or excessive bleeding or a temperature over 100 degrees by mouth, call the clinic. Do not change dressing by yourself.  If the dressing becomes wet or dirty, please call the clinic as it may need a new sterile dressing applied. You may start getting the foot wet after the stitches are removed.     Do not wear regular shoes with a surgical bandage and/or external pins in your foot. Wear loose fitting clothing that easily will slip over the bandage and/or pins. Do not cover your surgical foot with blankets as they may damage the dressing/pins. Also, remember that dogs are not aware of your surgery. Please keep them away from the bandage/pins.   If your surgeon places external pins in your foot, you must keep the foot dry until the pins are removed at 6-8 weeks after the surgery. Pins should be covered with a dressing for protection. You should examine the pins and your skin often. Check for any spreading redness or yellow drainage from the pin areas. Do not apply ointment around the pins. Do not push a loose pin back into your foot. Please call the clinic if the pin is spinning or moving in and out. If the pins are bumped or loosened they may need to be removed early. This may affect your surgical outcome.   Please call the clinic if you feel there is a problem with your pins and/or surgical bandage.    TIPS FOR SUCCESSFUL HEALING  How you care for the surgical site is critically important to achieve a successful result after surgery. Avoidance of injury, infection, excess swelling, scar tissue and stiffness are highly dependent on the care you provide over the next six weeks. Please do not hesitate to call if you have questions or concerns.   Your foot requires significant rest and elevation. Sitting for long hours with your foot elevated, however, will create its own problems. Expect muscle aches, back  pain, cramps, etc. Optimal posture, lumbar support, back exercises, ice and heat may all help with your new aches and pains. Do not apply a heating pad to your foot or leg as this can cause increase swelling and pain. Rather use ice in those areas.   Pain medications cause drowsiness. You may frequently sleep during the day and then have trouble sleeping at night. Over the counter sleep aids might be more effective than narcotic pain medication to achieve a reasonable night's sleep.    Narcotic pain medications and inactivity lead to constipation. Limiting use of narcotics will help minimize this problem. The pain medications will not completely alleviate your pain. The purpose of pain pills is to take the edge off and help you get through the first few days. You can substitute Extra Strength Tylenol if pain is mild. Please note that narcotic pain pills usually contain acetaminophen (the active ingredient in Tylenol) so be careful to avoid the maximum dose of acetaminophen. You should take measures to avoid constipation by drinking plenty of water, eating lots of fruit and vegetables and taking the recommended dose of Metamucil or a similar fiber supplement. These measures should be continued for as long as you require narcotic pain medications and are inactive.     Showering is a major challenge. Your incision requires about three days to become sealed from water. Your bandage should not get wet and should not be removed. Do not attempt showering for the first three days. A sponge bath is preferred. You may attempt to shower on the fourth day after the operation. Your foot should be covered with a bag, tape and rubber bands. Double bagging is preferred. Standing in the shower with a bag on your foot is quite hazardous. A portable shower stool would be ideal. The bandage will need to be changed in the office if it becomes moistened. A moist bandage will not dry on its own. A moist dressing may lead to infection.    Stiffness will develop after any operation due to scarring. The scar tissue begins to form immediately after the surgery. Inactivity can cause excess stiffness and may lead to blood clots in your legs. Frequent range of motion exercises will help decrease stiffness, blood clots, scar tissue and adhesions. Please call if you are unsure about these recommendations.   Good luck and best wishes on a prompt recovery. Healing is slow but an important step in your recovery. You are in control of the final result. Please use this time wisely. Please do not hesitate to call if you have questions, concerns or comments.    * If you have any post-operative questions or concerns regarding your procedure, call our triage team at the [facility] at [phone] (option [phone]).

## 2025-08-13 ENCOUNTER — OFFICE VISIT (OUTPATIENT)
Dept: FAMILY MEDICINE | Facility: CLINIC | Age: 85
End: 2025-08-13
Payer: COMMERCIAL

## 2025-08-13 VITALS
HEIGHT: 66 IN | HEART RATE: 85 BPM | OXYGEN SATURATION: 96 % | DIASTOLIC BLOOD PRESSURE: 68 MMHG | BODY MASS INDEX: 33.2 KG/M2 | WEIGHT: 206.6 LBS | RESPIRATION RATE: 16 BRPM | SYSTOLIC BLOOD PRESSURE: 120 MMHG

## 2025-08-13 DIAGNOSIS — M25.871 SESAMOIDITIS OF RIGHT FOOT: ICD-10-CM

## 2025-08-13 DIAGNOSIS — Z01.818 PREOP GENERAL PHYSICAL EXAM: Primary | ICD-10-CM

## 2025-08-13 PROCEDURE — 3074F SYST BP LT 130 MM HG: CPT | Performed by: NURSE PRACTITIONER

## 2025-08-13 PROCEDURE — 3078F DIAST BP <80 MM HG: CPT | Performed by: NURSE PRACTITIONER

## 2025-08-13 PROCEDURE — 99214 OFFICE O/P EST MOD 30 MIN: CPT | Performed by: NURSE PRACTITIONER

## 2025-08-13 PROCEDURE — 1125F AMNT PAIN NOTED PAIN PRSNT: CPT | Performed by: NURSE PRACTITIONER

## 2025-08-13 ASSESSMENT — ENCOUNTER SYMPTOMS
GASTROINTESTINAL NEGATIVE: 1
MUSCULOSKELETAL NEGATIVE: 1
CARDIOVASCULAR NEGATIVE: 1
NEUROLOGICAL NEGATIVE: 1
CONSTITUTIONAL NEGATIVE: 1
RESPIRATORY NEGATIVE: 1

## 2025-08-13 ASSESSMENT — PATIENT HEALTH QUESTIONNAIRE - PHQ9: SUM OF ALL RESPONSES TO PHQ QUESTIONS 1-9: 6

## 2025-08-13 ASSESSMENT — PAIN SCALES - GENERAL: PAINLEVEL_OUTOF10: MODERATE PAIN (5)

## 2025-08-18 ENCOUNTER — MYC MEDICAL ADVICE (OUTPATIENT)
Dept: PODIATRY | Facility: CLINIC | Age: 85
End: 2025-08-18
Payer: COMMERCIAL

## 2025-08-20 ENCOUNTER — VIRTUAL VISIT (OUTPATIENT)
Dept: PHARMACY | Facility: CLINIC | Age: 85
End: 2025-08-20
Payer: COMMERCIAL

## 2025-08-20 DIAGNOSIS — F33.1 MODERATE EPISODE OF RECURRENT MAJOR DEPRESSIVE DISORDER (H): ICD-10-CM

## 2025-08-20 DIAGNOSIS — F41.9 ANXIETY: ICD-10-CM

## 2025-08-20 DIAGNOSIS — K21.9 GASTROESOPHAGEAL REFLUX DISEASE, UNSPECIFIED WHETHER ESOPHAGITIS PRESENT: Primary | Chronic | ICD-10-CM

## 2025-08-20 DIAGNOSIS — M81.0 OSTEOPOROSIS, UNSPECIFIED OSTEOPOROSIS TYPE, UNSPECIFIED PATHOLOGICAL FRACTURE PRESENCE: ICD-10-CM

## 2025-08-20 DIAGNOSIS — G62.9 NEUROPATHY: ICD-10-CM

## 2025-08-20 DIAGNOSIS — G47.00 INSOMNIA, UNSPECIFIED TYPE: ICD-10-CM

## 2025-08-20 PROCEDURE — 99207 PR NO CHARGE LOS: CPT | Mod: 95 | Performed by: PHARMACIST

## 2025-08-20 RX ORDER — OMEPRAZOLE 20 MG/1
20 CAPSULE, DELAYED RELEASE ORAL DAILY
Status: CANCELLED | OUTPATIENT
Start: 2025-08-20

## 2025-08-20 RX ORDER — PANTOPRAZOLE SODIUM 20 MG/1
20 TABLET, DELAYED RELEASE ORAL EVERY OTHER DAY
Qty: 30 TABLET | Refills: 1 | Status: SHIPPED | OUTPATIENT
Start: 2025-08-20

## 2025-08-20 RX ORDER — PERPHENAZINE 16 MG
600 TABLET ORAL DAILY
COMMUNITY

## 2025-08-20 RX ORDER — FAMOTIDINE 20 MG/1
20 TABLET, FILM COATED ORAL 2 TIMES DAILY
Status: CANCELLED | OUTPATIENT
Start: 2025-08-20

## 2025-08-20 RX ORDER — PERPHENAZINE 16 MG
TABLET ORAL
Status: CANCELLED | OUTPATIENT
Start: 2025-08-20

## 2025-09-02 ENCOUNTER — LAB (OUTPATIENT)
Dept: LAB | Facility: CLINIC | Age: 85
End: 2025-09-02
Payer: COMMERCIAL

## 2025-09-02 DIAGNOSIS — M05.79 RHEUMATOID ARTHRITIS INVOLVING MULTIPLE SITES WITH POSITIVE RHEUMATOID FACTOR (H): ICD-10-CM

## 2025-09-02 DIAGNOSIS — Z79.899 HIGH RISK MEDICATION USE: ICD-10-CM

## 2025-09-02 LAB
ALBUMIN SERPL BCG-MCNC: 4.6 G/DL (ref 3.5–5.2)
ALP SERPL-CCNC: 43 U/L (ref 40–150)
ALT SERPL W P-5'-P-CCNC: 17 U/L (ref 0–50)
AST SERPL W P-5'-P-CCNC: 27 U/L (ref 0–45)
BASOPHILS # BLD AUTO: <0.04 10E3/UL (ref 0–0.2)
BASOPHILS NFR BLD AUTO: 0 %
BILIRUB SERPL-MCNC: 0.4 MG/DL
BILIRUBIN DIRECT (ROCHE PRO & PURE): 0.17 MG/DL (ref 0–0.45)
CREAT SERPL-MCNC: 0.68 MG/DL (ref 0.51–0.95)
CRP SERPL-MCNC: <3 MG/L
EGFRCR SERPLBLD CKD-EPI 2021: 86 ML/MIN/1.73M2
EOSINOPHIL # BLD AUTO: 0.12 10E3/UL (ref 0–0.7)
EOSINOPHIL NFR BLD AUTO: 2.4 %
ERYTHROCYTE [DISTWIDTH] IN BLOOD BY AUTOMATED COUNT: 13 % (ref 10–15)
ERYTHROCYTE [SEDIMENTATION RATE] IN BLOOD BY WESTERGREN METHOD: 16 MM/HR (ref 0–30)
HCT VFR BLD AUTO: 37.3 % (ref 35–47)
HGB BLD-MCNC: 12.8 G/DL (ref 11.7–15.7)
IMM GRANULOCYTES # BLD: 0.04 10E3/UL
IMM GRANULOCYTES NFR BLD: 0.8 %
LYMPHOCYTES # BLD AUTO: 1.22 10E3/UL (ref 0.8–5.3)
LYMPHOCYTES NFR BLD AUTO: 24.1 %
MCH RBC QN AUTO: 36 PG (ref 26.5–33)
MCHC RBC AUTO-ENTMCNC: 34.3 G/DL (ref 31.5–36.5)
MCV RBC AUTO: 104.8 FL (ref 78–100)
MONOCYTES # BLD AUTO: 0.84 10E3/UL (ref 0–1.3)
MONOCYTES NFR BLD AUTO: 16.6 %
NEUTROPHILS # BLD AUTO: 2.85 10E3/UL (ref 1.6–8.3)
NEUTROPHILS NFR BLD AUTO: 56.1 %
PLATELET # BLD AUTO: 331 10E3/UL (ref 150–450)
PROT SERPL-MCNC: 7.8 G/DL (ref 6.4–8.3)
RBC # BLD AUTO: 3.56 10E6/UL (ref 3.8–5.2)
WBC # BLD AUTO: 5.07 10E3/UL (ref 4–11)

## 2025-09-02 PROCEDURE — 36415 COLL VENOUS BLD VENIPUNCTURE: CPT

## 2025-09-02 PROCEDURE — 82565 ASSAY OF CREATININE: CPT

## 2025-09-02 PROCEDURE — 85652 RBC SED RATE AUTOMATED: CPT

## 2025-09-02 PROCEDURE — 85025 COMPLETE CBC W/AUTO DIFF WBC: CPT

## 2025-09-02 PROCEDURE — 86140 C-REACTIVE PROTEIN: CPT

## 2025-09-02 PROCEDURE — 80076 HEPATIC FUNCTION PANEL: CPT

## (undated) DEVICE — DECANTER VIAL 2006S

## (undated) DEVICE — ESU PENCIL SMOKE EVAC W/ROCKER SWITCH 0703-047-000

## (undated) DEVICE — SOL NACL 0.9% INJ 250ML BAG 2B1322Q

## (undated) DEVICE — DRSG STERI STRIP 1/2X4" R1547

## (undated) DEVICE — LINEN HALF SHEET 5512

## (undated) DEVICE — DRSG GAUZE 4X4" TRAY

## (undated) DEVICE — TUBING SUCTION 12"X1/4" N612

## (undated) DEVICE — DRAIN JACKSON PRATT RESERVOIR 100ML SU130-1305

## (undated) DEVICE — DECANTER BAG 2002S

## (undated) DEVICE — Device

## (undated) DEVICE — GLOVE PROTEXIS W/NEU-THERA 8.5  2D73TE85

## (undated) DEVICE — LINEN ORTHO ACL PACK 5447

## (undated) DEVICE — PACK HAND SOP32HARMO

## (undated) DEVICE — ESU CLEANER TIP 31142717

## (undated) DEVICE — ESU ELEC BLADE 6" COATED E1450-6

## (undated) DEVICE — CAST BUCKET

## (undated) DEVICE — SUTURE VICRYL+ 0 CT-1 18" DYED VIO VCP740D

## (undated) DEVICE — DRILL BIT ACUMED QUICK RELEASE 2.8MM 80-0387

## (undated) DEVICE — SU VICRYL+ 1 MO-4 18" DYED VCP702D

## (undated) DEVICE — NDL BLUNT 18GA 1" W/O FILTER 305181

## (undated) DEVICE — DRILL BIT ACUMED QUICK COUPLER 2.0MM 80-0318

## (undated) DEVICE — DRAPE MICROSCOPE OPMI ZEISS 48X118" 306071-0000-000

## (undated) DEVICE — PEN MARKING SKIN W/LABELS 31145884

## (undated) DEVICE — BAG CLEAR TRASH 1.3M 39X33" P4040C

## (undated) DEVICE — DEVICE RETRIEVER HEWSON 71111579

## (undated) DEVICE — CATH TRAY FOLEY SURESTEP 16FR W/URINE MTR STATLK LF A303416A

## (undated) DEVICE — ESU PENCIL W/HOLSTER E2350H

## (undated) DEVICE — GOWN REINFORCED XXLG 9071

## (undated) DEVICE — CUSHION INSERT LG PRONE VIEW JACKSON TABLE

## (undated) DEVICE — CAST PADDING 4" UNSTERILE 9044

## (undated) DEVICE — SU MONOCRYL 3-0 PS-2 27" Y427H

## (undated) DEVICE — SUTURE MONOCRYL+ 3-0 PS-1 27" UNDYED MCP936H

## (undated) DEVICE — DRAPE MAYO STAND 23X54 8337

## (undated) DEVICE — SU VICRYL 2-0 CT-1 27" UND J259H

## (undated) DEVICE — BLADE KNIFE SURG 11 371111

## (undated) DEVICE — SUCTION FRAZIER 12FR W/OBTURATOR 33120

## (undated) DEVICE — GLOVE BIOGEL PI SZ 6.5 40865

## (undated) DEVICE — SOL NACL 0.9% IRRIG 3000ML BAG 2B7477

## (undated) DEVICE — TUBING ACP CQ FOR JACKSON TABLE SPINE 5996-35

## (undated) DEVICE — MARKER SPHERES PASSIVE MEDT PACK 5 8801075

## (undated) DEVICE — SU NDL MAYO 1824-4

## (undated) DEVICE — PREP CHLORAPREP 26ML TINTED HI-LITE ORANGE 930815

## (undated) DEVICE — ESU CORD BIPOLAR GREEN 10-4000

## (undated) DEVICE — CAST PLASTER SPLINT 4X15" EXTRA FAST

## (undated) DEVICE — DRAPE CONVERTORS U-DRAPE 60X72" 8476

## (undated) DEVICE — DRAPE STERI TOWEL LG 1010

## (undated) DEVICE — BNDG ELASTIC 4"X5YDS UNSTERILE 6611-40

## (undated) DEVICE — SUCTION MANIFOLD NEPTUNE 2 SYS 4 PORT 0702-020-000

## (undated) DEVICE — LINEN DRAPE 54X72" 5467

## (undated) DEVICE — SU ETHIBOND 3-0 RB-1 30" X872H

## (undated) DEVICE — BLADE BONE MILL STRK 5.0MM MED 5400-701-000

## (undated) DEVICE — GLOVE BIOGEL PI SZ 7.5 40875

## (undated) DEVICE — GLOVE BIOGEL PI MICRO INDICATOR UNDERGLOVE SZ 7.0 48970

## (undated) DEVICE — SU VICRYL 2-0 CT-1 18' J739D

## (undated) DEVICE — GLOVE BIOGEL PI MICRO INDICATOR UNDERGLOVE SZ 8.5 48985

## (undated) DEVICE — LINEN POUCH DBL 5427

## (undated) DEVICE — SU SILK 2-0 SH 30" K833H

## (undated) DEVICE — GLOVE BIOGEL PI MICRO INDICATOR UNDERGLOVE SZ 7.5 48975

## (undated) DEVICE — PACK SMALL SPINE RIDGES

## (undated) DEVICE — DRSG KERLIX FLUFFS X5

## (undated) DEVICE — GLOVE BIOGEL PI SZ 8.5 40885

## (undated) DEVICE — SPONGE LAP 18X18" X8435

## (undated) DEVICE — PACK SET-UP STD 9102

## (undated) DEVICE — GLOVE BIOGEL PI SZ 7.0 40870

## (undated) DEVICE — DRSG TEGADERM 4X10" 1627

## (undated) DEVICE — DRSG SILVERCEL 4.25X4.25" 900404

## (undated) DEVICE — SLING ARM LG 79-99157

## (undated) DEVICE — LINEN TOWEL PACK X5 5464

## (undated) DEVICE — ESU BIPOLAR SEALER AQUAMANTYS 6MM 23-112-1

## (undated) DEVICE — BONE CLEANING TIP INTERPULSE FEMORAL CANAL 0210-008-000

## (undated) DEVICE — SOL WATER IRRIG 1000ML BOTTLE 2F7114

## (undated) DEVICE — DRAPE C-ARM MINI 5423

## (undated) DEVICE — SPONGE SURGIFOAM 100 1974

## (undated) DEVICE — LINEN FULL SHEET 5511

## (undated) DEVICE — DRAPE X-RAY TUBE 00-901169-01-OEC

## (undated) DEVICE — GLOVE BIOGEL PI MICRO INDICATOR UNDERGLOVE SZ 8.0 48980

## (undated) DEVICE — SOL ADH LIQUID BENZOIN SWAB 0.6ML C1544

## (undated) DEVICE — TOURNIQUET SGL BLADDER 18"X4" RED 5921-218-135

## (undated) DEVICE — DRSG GAUZE 4X4" 8044

## (undated) DEVICE — IMM PILLOW ABDUCT HIP MED M60-025-M

## (undated) DEVICE — DRAPE IOBAN INCISE 23X17" 6650EZ

## (undated) DEVICE — DRAIN JACKSON PRATT CHANNEL 10FR RND SIL W/TROCAR JP-2227

## (undated) DEVICE — SU VICRYL 0 CT-2 27" J334H

## (undated) DEVICE — PAD CHUX UNDERPAD 30X36" P3036C

## (undated) DEVICE — BLADE CLIPPER SGL USE 9680

## (undated) DEVICE — SOL NACL 0.9% IRRIG 1000ML BOTTLE 2F7124

## (undated) DEVICE — SU VICRYL 0 CT-1 CR 8X18" J740D

## (undated) DEVICE — NDL SPINAL 18GA 3.5" 405184

## (undated) DEVICE — DRSG TELFA ISLAND 4X10"

## (undated) DEVICE — HOOD SURG T7PLUS PEEL AWAY FACE SHIELD STRL LF 0416-801-100

## (undated) DEVICE — SET HANDPIECE INTERPULSE W/COAXIAL FAN SPRAY TIP 0210118000

## (undated) DEVICE — CATH TRAY FOLEY SURESTEP 16FR DRAIN BAG STATOCK A899916

## (undated) DEVICE — DRSG ADAPTIC 3X8" 6113

## (undated) DEVICE — BLADE KNIFE SURG 15 371115

## (undated) DEVICE — GLOVE PROTEXIS BLUE W/NEU-THERA 8.5  2D73EB85

## (undated) DEVICE — CAST PADDING 4" STERILE 9044S

## (undated) DEVICE — CAST PLASTER SPLINT XTRA FAST 5X30" 7392

## (undated) DEVICE — ESU GROUND PAD ADULT W/CORD E7507

## (undated) DEVICE — RX SURGIFLO HEMOSTATIC MATRIX 8ML 2991

## (undated) DEVICE — BLADE SAW SAGITTAL STRK 18X90X1.27MM HD SYS 6 6118-127-090

## (undated) DEVICE — SU MONOCRYL 4-0 PS-2 27" UND Y426H

## (undated) DEVICE — MIDAS REX DISSECTING TOOL  14MH30

## (undated) DEVICE — BNDG ELASTIC 4" DBL LENGTH UNSTERILE 6611-14

## (undated) DEVICE — DRAPE STERI U 1015

## (undated) DEVICE — PREP DURAPREP 26ML APL 8630

## (undated) DEVICE — GLOVE PROTEXIS W/NEU-THERA 7.5  2D73TE75

## (undated) DEVICE — DRAPE EXTREMITY W/ARMBOARD 29405

## (undated) DEVICE — PACK TOTAL HIP RIDGES LATEX PO15HIFSG

## (undated) RX ORDER — EPHEDRINE SULFATE 50 MG/ML
INJECTION, SOLUTION INTRAMUSCULAR; INTRAVENOUS; SUBCUTANEOUS
Status: DISPENSED
Start: 2023-02-12

## (undated) RX ORDER — CEFAZOLIN SODIUM/WATER 2 G/20 ML
SYRINGE (ML) INTRAVENOUS
Status: DISPENSED
Start: 2024-12-13

## (undated) RX ORDER — BUPIVACAINE HYDROCHLORIDE 2.5 MG/ML
INJECTION, SOLUTION EPIDURAL; INFILTRATION; INTRACAUDAL
Status: DISPENSED
Start: 2023-02-12

## (undated) RX ORDER — SEVOFLURANE 250 ML/250ML
LIQUID RESPIRATORY (INHALATION)
Status: DISPENSED
Start: 2024-12-13

## (undated) RX ORDER — OXYCODONE HYDROCHLORIDE 5 MG/1
TABLET ORAL
Status: DISPENSED
Start: 2024-12-13

## (undated) RX ORDER — LIDOCAINE HYDROCHLORIDE 10 MG/ML
INJECTION, SOLUTION EPIDURAL; INFILTRATION; INTRACAUDAL; PERINEURAL
Status: DISPENSED
Start: 2024-11-15

## (undated) RX ORDER — FENTANYL CITRATE 50 UG/ML
INJECTION, SOLUTION INTRAMUSCULAR; INTRAVENOUS
Status: DISPENSED
Start: 2024-12-13

## (undated) RX ORDER — METHADONE HYDROCHLORIDE 10 MG/ML
INJECTION, SOLUTION INTRAMUSCULAR; INTRAVENOUS; SUBCUTANEOUS
Status: DISPENSED
Start: 2023-02-12

## (undated) RX ORDER — CEFAZOLIN SODIUM/WATER 2 G/20 ML
SYRINGE (ML) INTRAVENOUS
Status: DISPENSED
Start: 2023-02-12

## (undated) RX ORDER — NEOSTIGMINE METHYLSULFATE 1 MG/ML
VIAL (ML) INJECTION
Status: DISPENSED
Start: 2023-02-12

## (undated) RX ORDER — NEOSTIGMINE METHYLSULFATE 5 MG/5 ML
SYRINGE (ML) INTRAVENOUS
Status: DISPENSED
Start: 2017-04-03

## (undated) RX ORDER — FENTANYL CITRATE-0.9 % NACL/PF 10 MCG/ML
PLASTIC BAG, INJECTION (ML) INTRAVENOUS
Status: DISPENSED
Start: 2024-12-13

## (undated) RX ORDER — DEXAMETHASONE SODIUM PHOSPHATE 4 MG/ML
INJECTION, SOLUTION INTRA-ARTICULAR; INTRALESIONAL; INTRAMUSCULAR; INTRAVENOUS; SOFT TISSUE
Status: DISPENSED
Start: 2017-04-03

## (undated) RX ORDER — FENTANYL CITRATE 50 UG/ML
INJECTION, SOLUTION INTRAMUSCULAR; INTRAVENOUS
Status: DISPENSED
Start: 2017-04-03

## (undated) RX ORDER — LIDOCAINE HYDROCHLORIDE 10 MG/ML
INJECTION, SOLUTION EPIDURAL; INFILTRATION; INTRACAUDAL; PERINEURAL
Status: DISPENSED
Start: 2017-04-03

## (undated) RX ORDER — PROPOFOL 10 MG/ML
INJECTION, EMULSION INTRAVENOUS
Status: DISPENSED
Start: 2024-12-13

## (undated) RX ORDER — ONDANSETRON 2 MG/ML
INJECTION INTRAMUSCULAR; INTRAVENOUS
Status: DISPENSED
Start: 2017-04-03

## (undated) RX ORDER — GLYCOPYRROLATE 0.2 MG/ML
INJECTION INTRAMUSCULAR; INTRAVENOUS
Status: DISPENSED
Start: 2023-02-12

## (undated) RX ORDER — CEFAZOLIN SODIUM 2 G/100ML
INJECTION, SOLUTION INTRAVENOUS
Status: DISPENSED
Start: 2017-04-03

## (undated) RX ORDER — PROPOFOL 10 MG/ML
INJECTION, EMULSION INTRAVENOUS
Status: DISPENSED
Start: 2017-04-03

## (undated) RX ORDER — GLYCOPYRROLATE 0.2 MG/ML
INJECTION INTRAMUSCULAR; INTRAVENOUS
Status: DISPENSED
Start: 2017-04-03

## (undated) RX ORDER — BUPIVACAINE HYDROCHLORIDE AND EPINEPHRINE 5; 5 MG/ML; UG/ML
INJECTION, SOLUTION EPIDURAL; INTRACAUDAL; PERINEURAL
Status: DISPENSED
Start: 2017-04-03

## (undated) RX ORDER — ONDANSETRON 2 MG/ML
INJECTION INTRAMUSCULAR; INTRAVENOUS
Status: DISPENSED
Start: 2023-02-12

## (undated) RX ORDER — TRANEXAMIC ACID 650 MG/1
TABLET ORAL
Status: DISPENSED
Start: 2024-12-13

## (undated) RX ORDER — DEXMEDETOMIDINE HYDROCHLORIDE 4 UG/ML
INJECTION, SOLUTION INTRAVENOUS
Status: DISPENSED
Start: 2023-02-12

## (undated) RX ORDER — CEFAZOLIN SODIUM 1 G/3ML
INJECTION, POWDER, FOR SOLUTION INTRAMUSCULAR; INTRAVENOUS
Status: DISPENSED
Start: 2023-02-12